# Patient Record
Sex: FEMALE | Race: WHITE | NOT HISPANIC OR LATINO | Employment: OTHER | URBAN - METROPOLITAN AREA
[De-identification: names, ages, dates, MRNs, and addresses within clinical notes are randomized per-mention and may not be internally consistent; named-entity substitution may affect disease eponyms.]

---

## 2017-04-06 ENCOUNTER — HOSPITAL ENCOUNTER (OUTPATIENT)
Dept: NON INVASIVE DIAGNOSTICS | Facility: MEDICAL CENTER | Age: 77
Discharge: HOME | End: 2017-04-06
Payer: MEDICARE

## 2017-04-06 DIAGNOSIS — I10 ESSENTIAL (PRIMARY) HYPERTENSION: ICD-10-CM

## 2017-04-06 DIAGNOSIS — I25.10 ATHEROSCLEROTIC HEART DISEASE OF NATIVE CORONARY ARTERY WITHOUT ANGINA PECTORIS: ICD-10-CM

## 2017-04-06 PROCEDURE — 93306 TTE W/DOPPLER COMPLETE: CPT

## 2017-05-22 ENCOUNTER — ALLSCRIPTS OFFICE VISIT (OUTPATIENT)
Dept: OTHER | Facility: OTHER | Age: 77
End: 2017-05-22

## 2017-06-21 ENCOUNTER — ALLSCRIPTS OFFICE VISIT (OUTPATIENT)
Dept: OTHER | Facility: OTHER | Age: 77
End: 2017-06-21

## 2017-06-21 DIAGNOSIS — I10 ESSENTIAL (PRIMARY) HYPERTENSION: ICD-10-CM

## 2017-06-21 DIAGNOSIS — F41.9 ANXIETY DISORDER: ICD-10-CM

## 2017-06-21 DIAGNOSIS — M81.0 AGE-RELATED OSTEOPOROSIS WITHOUT CURRENT PATHOLOGICAL FRACTURE: ICD-10-CM

## 2017-06-21 DIAGNOSIS — E78.5 HYPERLIPIDEMIA: ICD-10-CM

## 2017-06-28 LAB
ADDITIONAL INFORMATION (HISTORICAL): NORMAL
ADEQUACY: (HISTORICAL): NORMAL
COMMENT (HISTORICAL): NORMAL
CYTOTECHNOLOGIST: (HISTORICAL): NORMAL
HPV MRNA E6/E7 (HISTORICAL): NOT DETECTED
INTERPRETATION (HISTORICAL): NORMAL
LMP (HISTORICAL): NORMAL
PREV. BX: (HISTORICAL): NORMAL
PREV. PAP (HISTORICAL): NORMAL
SOURCE (HISTORICAL): NORMAL

## 2017-07-25 ENCOUNTER — APPOINTMENT (OUTPATIENT)
Dept: LAB | Facility: MEDICAL CENTER | Age: 77
End: 2017-07-25
Payer: MEDICARE

## 2017-07-25 DIAGNOSIS — E78.5 HYPERLIPIDEMIA: ICD-10-CM

## 2017-07-25 DIAGNOSIS — F41.9 ANXIETY DISORDER: ICD-10-CM

## 2017-07-25 DIAGNOSIS — M81.0 AGE-RELATED OSTEOPOROSIS WITHOUT CURRENT PATHOLOGICAL FRACTURE: ICD-10-CM

## 2017-07-25 DIAGNOSIS — I10 ESSENTIAL (PRIMARY) HYPERTENSION: ICD-10-CM

## 2017-07-25 LAB
25(OH)D3 SERPL-MCNC: 43.5 NG/ML (ref 30–100)
ALBUMIN SERPL BCP-MCNC: 3.2 G/DL (ref 3.5–5)
ALP SERPL-CCNC: 86 U/L (ref 46–116)
ALT SERPL W P-5'-P-CCNC: 20 U/L (ref 12–78)
ANION GAP SERPL CALCULATED.3IONS-SCNC: 6 MMOL/L (ref 4–13)
AST SERPL W P-5'-P-CCNC: 18 U/L (ref 5–45)
BASOPHILS # BLD AUTO: 0.02 THOUSANDS/ΜL (ref 0–0.1)
BASOPHILS NFR BLD AUTO: 1 % (ref 0–1)
BILIRUB SERPL-MCNC: 0.93 MG/DL (ref 0.2–1)
BUN SERPL-MCNC: 13 MG/DL (ref 5–25)
CALCIUM SERPL-MCNC: 8.8 MG/DL (ref 8.3–10.1)
CHLORIDE SERPL-SCNC: 108 MMOL/L (ref 100–108)
CHOLEST SERPL-MCNC: 196 MG/DL (ref 50–200)
CO2 SERPL-SCNC: 26 MMOL/L (ref 21–32)
CREAT SERPL-MCNC: 0.68 MG/DL (ref 0.6–1.3)
EOSINOPHIL # BLD AUTO: 0.18 THOUSAND/ΜL (ref 0–0.61)
EOSINOPHIL NFR BLD AUTO: 4 % (ref 0–6)
ERYTHROCYTE [DISTWIDTH] IN BLOOD BY AUTOMATED COUNT: 13.4 % (ref 11.6–15.1)
GFR SERPL CREATININE-BSD FRML MDRD: 85 ML/MIN/1.73SQ M
GLUCOSE P FAST SERPL-MCNC: 83 MG/DL (ref 65–99)
HCT VFR BLD AUTO: 37.7 % (ref 34.8–46.1)
HDLC SERPL-MCNC: 54 MG/DL (ref 40–60)
HGB BLD-MCNC: 12.4 G/DL (ref 11.5–15.4)
LDLC SERPL CALC-MCNC: 119 MG/DL (ref 0–100)
LYMPHOCYTES # BLD AUTO: 0.92 THOUSANDS/ΜL (ref 0.6–4.47)
LYMPHOCYTES NFR BLD AUTO: 22 % (ref 14–44)
MCH RBC QN AUTO: 30.2 PG (ref 26.8–34.3)
MCHC RBC AUTO-ENTMCNC: 32.9 G/DL (ref 31.4–37.4)
MCV RBC AUTO: 92 FL (ref 82–98)
MONOCYTES # BLD AUTO: 0.48 THOUSAND/ΜL (ref 0.17–1.22)
MONOCYTES NFR BLD AUTO: 11 % (ref 4–12)
NEUTROPHILS # BLD AUTO: 2.65 THOUSANDS/ΜL (ref 1.85–7.62)
NEUTS SEG NFR BLD AUTO: 62 % (ref 43–75)
NRBC BLD AUTO-RTO: 0 /100 WBCS
PLATELET # BLD AUTO: 194 THOUSANDS/UL (ref 149–390)
PMV BLD AUTO: 10.6 FL (ref 8.9–12.7)
POTASSIUM SERPL-SCNC: 4 MMOL/L (ref 3.5–5.3)
PROT SERPL-MCNC: 6.5 G/DL (ref 6.4–8.2)
RBC # BLD AUTO: 4.11 MILLION/UL (ref 3.81–5.12)
SODIUM SERPL-SCNC: 140 MMOL/L (ref 136–145)
TRIGL SERPL-MCNC: 115 MG/DL
TSH SERPL DL<=0.05 MIU/L-ACNC: 1.17 UIU/ML (ref 0.36–3.74)
WBC # BLD AUTO: 4.26 THOUSAND/UL (ref 4.31–10.16)

## 2017-07-25 PROCEDURE — 82306 VITAMIN D 25 HYDROXY: CPT

## 2017-07-25 PROCEDURE — 85025 COMPLETE CBC W/AUTO DIFF WBC: CPT

## 2017-07-25 PROCEDURE — 84443 ASSAY THYROID STIM HORMONE: CPT

## 2017-07-25 PROCEDURE — 80061 LIPID PANEL: CPT

## 2017-07-25 PROCEDURE — 80053 COMPREHEN METABOLIC PANEL: CPT

## 2017-07-25 PROCEDURE — 36415 COLL VENOUS BLD VENIPUNCTURE: CPT

## 2017-08-22 ENCOUNTER — HOSPITAL ENCOUNTER (INPATIENT)
Facility: HOSPITAL | Age: 77
LOS: 3 days | Discharge: HOME | DRG: 394 | End: 2017-08-25
Attending: EMERGENCY MEDICINE | Admitting: INTERNAL MEDICINE
Payer: MEDICARE

## 2017-08-22 DIAGNOSIS — K92.2 GI BLEED: Primary | ICD-10-CM

## 2017-08-22 DIAGNOSIS — K62.5 RECTAL BLEEDING: ICD-10-CM

## 2017-08-22 PROBLEM — K21.9 GERD (GASTROESOPHAGEAL REFLUX DISEASE): Chronic | Status: ACTIVE | Noted: 2017-08-22

## 2017-08-22 PROBLEM — I10 HTN (HYPERTENSION): Chronic | Status: ACTIVE | Noted: 2017-08-22

## 2017-08-22 PROBLEM — I25.10 CAD (CORONARY ARTERY DISEASE): Chronic | Status: ACTIVE | Noted: 2017-08-22

## 2017-08-22 LAB
ANION GAP SERPL CALCULATED.3IONS-SCNC: 8 MMOL/L (ref 4–13)
APTT PPP: 21 SECONDS (ref 23–35)
BASOPHILS # BLD AUTO: 0.02 THOUSANDS/ΜL (ref 0–0.1)
BASOPHILS NFR BLD AUTO: 0 % (ref 0–1)
BUN SERPL-MCNC: 16 MG/DL (ref 5–25)
CALCIUM SERPL-MCNC: 9.1 MG/DL (ref 8.3–10.1)
CHLORIDE SERPL-SCNC: 105 MMOL/L (ref 100–108)
CO2 SERPL-SCNC: 26 MMOL/L (ref 21–32)
CREAT SERPL-MCNC: 0.78 MG/DL (ref 0.6–1.3)
EOSINOPHIL # BLD AUTO: 0.2 THOUSAND/ΜL (ref 0–0.61)
EOSINOPHIL NFR BLD AUTO: 3 % (ref 0–6)
ERYTHROCYTE [DISTWIDTH] IN BLOOD BY AUTOMATED COUNT: 13.5 % (ref 11.6–15.1)
GFR SERPL CREATININE-BSD FRML MDRD: 74 ML/MIN/1.73SQ M
GLUCOSE SERPL-MCNC: 103 MG/DL (ref 65–140)
HCT VFR BLD AUTO: 38.2 % (ref 34.8–46.1)
HCT VFR BLD AUTO: 42.8 % (ref 34.8–46.1)
HGB BLD-MCNC: 12.2 G/DL (ref 11.5–15.4)
HGB BLD-MCNC: 13.9 G/DL (ref 11.5–15.4)
HOLD SPECIMEN: NORMAL
INR PPP: 0.91 (ref 0.86–1.16)
LACTATE SERPL-SCNC: 1.3 MMOL/L (ref 0.5–2)
LYMPHOCYTES # BLD AUTO: 1.28 THOUSANDS/ΜL (ref 0.6–4.47)
LYMPHOCYTES NFR BLD AUTO: 18 % (ref 14–44)
MCH RBC QN AUTO: 30 PG (ref 26.8–34.3)
MCHC RBC AUTO-ENTMCNC: 32.5 G/DL (ref 31.4–37.4)
MCV RBC AUTO: 92 FL (ref 82–98)
MONOCYTES # BLD AUTO: 0.55 THOUSAND/ΜL (ref 0.17–1.22)
MONOCYTES NFR BLD AUTO: 8 % (ref 4–12)
NEUTROPHILS # BLD AUTO: 5.12 THOUSANDS/ΜL (ref 1.85–7.62)
NEUTS SEG NFR BLD AUTO: 71 % (ref 43–75)
PLATELET # BLD AUTO: 192 THOUSANDS/UL (ref 149–390)
PMV BLD AUTO: 10.4 FL (ref 8.9–12.7)
POTASSIUM SERPL-SCNC: 4.2 MMOL/L (ref 3.5–5.3)
PROTHROMBIN TIME: 12.5 SECONDS (ref 12.1–14.4)
RBC # BLD AUTO: 4.63 MILLION/UL (ref 3.81–5.12)
SODIUM SERPL-SCNC: 139 MMOL/L (ref 136–145)
WBC # BLD AUTO: 7.17 THOUSAND/UL (ref 4.31–10.16)

## 2017-08-22 PROCEDURE — 85730 THROMBOPLASTIN TIME PARTIAL: CPT | Performed by: EMERGENCY MEDICINE

## 2017-08-22 PROCEDURE — 80048 BASIC METABOLIC PNL TOTAL CA: CPT | Performed by: EMERGENCY MEDICINE

## 2017-08-22 PROCEDURE — 99285 EMERGENCY DEPT VISIT HI MDM: CPT

## 2017-08-22 PROCEDURE — 83605 ASSAY OF LACTIC ACID: CPT | Performed by: INTERNAL MEDICINE

## 2017-08-22 PROCEDURE — 36415 COLL VENOUS BLD VENIPUNCTURE: CPT | Performed by: EMERGENCY MEDICINE

## 2017-08-22 PROCEDURE — 85014 HEMATOCRIT: CPT | Performed by: INTERNAL MEDICINE

## 2017-08-22 PROCEDURE — 85610 PROTHROMBIN TIME: CPT | Performed by: EMERGENCY MEDICINE

## 2017-08-22 PROCEDURE — 85025 COMPLETE CBC W/AUTO DIFF WBC: CPT | Performed by: EMERGENCY MEDICINE

## 2017-08-22 PROCEDURE — 96360 HYDRATION IV INFUSION INIT: CPT

## 2017-08-22 PROCEDURE — 85018 HEMOGLOBIN: CPT | Performed by: INTERNAL MEDICINE

## 2017-08-22 PROCEDURE — 96361 HYDRATE IV INFUSION ADD-ON: CPT

## 2017-08-22 RX ORDER — AMLODIPINE BESYLATE 5 MG/1
5 TABLET ORAL DAILY
Status: DISCONTINUED | OUTPATIENT
Start: 2017-08-23 | End: 2017-08-25 | Stop reason: HOSPADM

## 2017-08-22 RX ORDER — NITROGLYCERIN 0.4 MG/1
0.4 TABLET SUBLINGUAL
Status: DISCONTINUED | OUTPATIENT
Start: 2017-08-22 | End: 2017-08-25 | Stop reason: HOSPADM

## 2017-08-22 RX ORDER — PANTOPRAZOLE SODIUM 40 MG/1
40 TABLET, DELAYED RELEASE ORAL
Status: DISCONTINUED | OUTPATIENT
Start: 2017-08-23 | End: 2017-08-25 | Stop reason: HOSPADM

## 2017-08-22 RX ORDER — ACETAMINOPHEN 325 MG/1
650 TABLET ORAL EVERY 6 HOURS PRN
Status: DISCONTINUED | OUTPATIENT
Start: 2017-08-22 | End: 2017-08-25 | Stop reason: HOSPADM

## 2017-08-22 RX ORDER — CHOLECALCIFEROL (VITAMIN D3) 125 MCG
200 CAPSULE ORAL DAILY
Status: DISCONTINUED | OUTPATIENT
Start: 2017-08-23 | End: 2017-08-25 | Stop reason: HOSPADM

## 2017-08-22 RX ORDER — UBIDECARENONE 75 MG
1 CAPSULE ORAL DAILY
COMMUNITY
End: 2018-10-26 | Stop reason: ALTCHOICE

## 2017-08-22 RX ORDER — PRASUGREL 10 MG/1
10 TABLET, FILM COATED ORAL EVERY OTHER DAY
COMMUNITY
End: 2018-10-05 | Stop reason: SDUPTHER

## 2017-08-22 RX ORDER — CIPROFLOXACIN 2 MG/ML
400 INJECTION, SOLUTION INTRAVENOUS EVERY 12 HOURS
Status: DISCONTINUED | OUTPATIENT
Start: 2017-08-22 | End: 2017-08-25 | Stop reason: SDUPTHER

## 2017-08-22 RX ORDER — METOPROLOL SUCCINATE 25 MG/1
25 TABLET, EXTENDED RELEASE ORAL DAILY
COMMUNITY
End: 2018-05-30 | Stop reason: SDUPTHER

## 2017-08-22 RX ORDER — MELATONIN
2000 DAILY
Status: DISCONTINUED | OUTPATIENT
Start: 2017-08-23 | End: 2017-08-25 | Stop reason: HOSPADM

## 2017-08-22 RX ORDER — SODIUM CHLORIDE 9 MG/ML
75 INJECTION, SOLUTION INTRAVENOUS CONTINUOUS
Status: DISCONTINUED | OUTPATIENT
Start: 2017-08-22 | End: 2017-08-24

## 2017-08-22 RX ORDER — LOSARTAN POTASSIUM 50 MG/1
50 TABLET ORAL DAILY
Status: DISCONTINUED | OUTPATIENT
Start: 2017-08-23 | End: 2017-08-25 | Stop reason: HOSPADM

## 2017-08-22 RX ORDER — TORSEMIDE 20 MG/1
20 TABLET ORAL DAILY
COMMUNITY
End: 2020-12-05 | Stop reason: HOSPADM

## 2017-08-22 RX ORDER — ISOSORBIDE MONONITRATE 60 MG/1
60 TABLET, EXTENDED RELEASE ORAL DAILY
Status: DISCONTINUED | OUTPATIENT
Start: 2017-08-23 | End: 2017-08-25 | Stop reason: HOSPADM

## 2017-08-22 RX ORDER — RANOLAZINE 500 MG/1
500 TABLET, EXTENDED RELEASE ORAL DAILY
Status: DISCONTINUED | OUTPATIENT
Start: 2017-08-22 | End: 2017-08-25 | Stop reason: HOSPADM

## 2017-08-22 RX ORDER — ONDANSETRON 2 MG/ML
4 INJECTION INTRAMUSCULAR; INTRAVENOUS EVERY 6 HOURS PRN
Status: DISCONTINUED | OUTPATIENT
Start: 2017-08-22 | End: 2017-08-25 | Stop reason: HOSPADM

## 2017-08-22 RX ADMIN — SODIUM CHLORIDE 1000 ML: 0.9 INJECTION, SOLUTION INTRAVENOUS at 17:17

## 2017-08-22 RX ADMIN — METOPROLOL TARTRATE 25 MG: 25 TABLET ORAL at 22:23

## 2017-08-22 RX ADMIN — CIPROFLOXACIN 400 MG: 2 INJECTION, SOLUTION INTRAVENOUS at 22:24

## 2017-08-22 RX ADMIN — SODIUM CHLORIDE 75 ML/HR: 0.9 INJECTION, SOLUTION INTRAVENOUS at 21:43

## 2017-08-23 LAB
ANION GAP SERPL CALCULATED.3IONS-SCNC: 9 MMOL/L (ref 4–13)
BUN SERPL-MCNC: 11 MG/DL (ref 5–25)
CALCIUM SERPL-MCNC: 8.6 MG/DL (ref 8.3–10.1)
CHLORIDE SERPL-SCNC: 107 MMOL/L (ref 100–108)
CO2 SERPL-SCNC: 22 MMOL/L (ref 21–32)
CREAT SERPL-MCNC: 0.68 MG/DL (ref 0.6–1.3)
ERYTHROCYTE [DISTWIDTH] IN BLOOD BY AUTOMATED COUNT: 13.5 % (ref 11.6–15.1)
GFR SERPL CREATININE-BSD FRML MDRD: 85 ML/MIN/1.73SQ M
GLUCOSE SERPL-MCNC: 102 MG/DL (ref 65–140)
HCT VFR BLD AUTO: 38.6 % (ref 34.8–46.1)
HGB BLD-MCNC: 12.3 G/DL (ref 11.5–15.4)
MCH RBC QN AUTO: 29.9 PG (ref 26.8–34.3)
MCHC RBC AUTO-ENTMCNC: 31.9 G/DL (ref 31.4–37.4)
MCV RBC AUTO: 94 FL (ref 82–98)
PLATELET # BLD AUTO: 175 THOUSANDS/UL (ref 149–390)
PMV BLD AUTO: 10.4 FL (ref 8.9–12.7)
POTASSIUM SERPL-SCNC: 3.8 MMOL/L (ref 3.5–5.3)
RBC # BLD AUTO: 4.12 MILLION/UL (ref 3.81–5.12)
SODIUM SERPL-SCNC: 138 MMOL/L (ref 136–145)
WBC # BLD AUTO: 4.63 THOUSAND/UL (ref 4.31–10.16)

## 2017-08-23 PROCEDURE — 85027 COMPLETE CBC AUTOMATED: CPT | Performed by: INTERNAL MEDICINE

## 2017-08-23 PROCEDURE — 80048 BASIC METABOLIC PNL TOTAL CA: CPT | Performed by: INTERNAL MEDICINE

## 2017-08-23 RX ORDER — METOPROLOL TARTRATE 50 MG/1
50 TABLET, FILM COATED ORAL DAILY
Status: DISCONTINUED | OUTPATIENT
Start: 2017-08-23 | End: 2017-08-25 | Stop reason: HOSPADM

## 2017-08-23 RX ADMIN — METRONIDAZOLE 500 MG: 500 INJECTION, SOLUTION INTRAVENOUS at 08:48

## 2017-08-23 RX ADMIN — ACETAMINOPHEN 650 MG: 325 TABLET ORAL at 06:00

## 2017-08-23 RX ADMIN — LOSARTAN POTASSIUM 50 MG: 50 TABLET ORAL at 08:47

## 2017-08-23 RX ADMIN — SODIUM CHLORIDE 75 ML/HR: 0.9 INJECTION, SOLUTION INTRAVENOUS at 11:32

## 2017-08-23 RX ADMIN — AMLODIPINE BESYLATE 5 MG: 5 TABLET ORAL at 08:47

## 2017-08-23 RX ADMIN — PANTOPRAZOLE SODIUM 40 MG: 40 TABLET, DELAYED RELEASE ORAL at 06:01

## 2017-08-23 RX ADMIN — METRONIDAZOLE 500 MG: 500 INJECTION, SOLUTION INTRAVENOUS at 01:05

## 2017-08-23 RX ADMIN — METRONIDAZOLE 500 MG: 500 INJECTION, SOLUTION INTRAVENOUS at 16:31

## 2017-08-23 RX ADMIN — ISOSORBIDE MONONITRATE 60 MG: 60 TABLET, EXTENDED RELEASE ORAL at 08:47

## 2017-08-23 RX ADMIN — CIPROFLOXACIN 400 MG: 2 INJECTION, SOLUTION INTRAVENOUS at 21:18

## 2017-08-23 RX ADMIN — METOPROLOL TARTRATE 25 MG: 25 TABLET ORAL at 08:47

## 2017-08-23 RX ADMIN — RANOLAZINE 500 MG: 500 TABLET, FILM COATED, EXTENDED RELEASE ORAL at 08:47

## 2017-08-23 RX ADMIN — CHOLECALCIFEROL TAB 25 MCG (1000 UNIT) 2000 UNITS: 25 TAB at 08:47

## 2017-08-23 RX ADMIN — CIPROFLOXACIN 400 MG: 2 INJECTION, SOLUTION INTRAVENOUS at 11:31

## 2017-08-24 LAB
HCT VFR BLD AUTO: 38.4 % (ref 34.8–46.1)
HGB BLD-MCNC: 12.3 G/DL (ref 11.5–15.4)

## 2017-08-24 PROCEDURE — 85018 HEMOGLOBIN: CPT | Performed by: INTERNAL MEDICINE

## 2017-08-24 PROCEDURE — 85014 HEMATOCRIT: CPT | Performed by: INTERNAL MEDICINE

## 2017-08-24 RX ADMIN — METRONIDAZOLE 500 MG: 500 INJECTION, SOLUTION INTRAVENOUS at 16:44

## 2017-08-24 RX ADMIN — METOPROLOL TARTRATE 25 MG: 25 TABLET ORAL at 13:24

## 2017-08-24 RX ADMIN — SODIUM CHLORIDE 75 ML/HR: 0.9 INJECTION, SOLUTION INTRAVENOUS at 05:35

## 2017-08-24 RX ADMIN — AMLODIPINE BESYLATE 5 MG: 5 TABLET ORAL at 09:34

## 2017-08-24 RX ADMIN — METRONIDAZOLE 500 MG: 500 INJECTION, SOLUTION INTRAVENOUS at 01:57

## 2017-08-24 RX ADMIN — CHOLECALCIFEROL TAB 25 MCG (1000 UNIT) 2000 UNITS: 25 TAB at 09:33

## 2017-08-24 RX ADMIN — Medication 200 MG: at 09:33

## 2017-08-24 RX ADMIN — PANTOPRAZOLE SODIUM 40 MG: 40 TABLET, DELAYED RELEASE ORAL at 05:41

## 2017-08-24 RX ADMIN — RANOLAZINE 500 MG: 500 TABLET, FILM COATED, EXTENDED RELEASE ORAL at 09:33

## 2017-08-24 RX ADMIN — LOSARTAN POTASSIUM 50 MG: 50 TABLET ORAL at 09:33

## 2017-08-24 RX ADMIN — METRONIDAZOLE 500 MG: 500 INJECTION, SOLUTION INTRAVENOUS at 09:29

## 2017-08-24 RX ADMIN — CIPROFLOXACIN 400 MG: 2 INJECTION, SOLUTION INTRAVENOUS at 10:26

## 2017-08-24 RX ADMIN — METOPROLOL TARTRATE 50 MG: 50 TABLET ORAL at 09:34

## 2017-08-24 RX ADMIN — ISOSORBIDE MONONITRATE 60 MG: 60 TABLET, EXTENDED RELEASE ORAL at 09:33

## 2017-08-25 VITALS
RESPIRATION RATE: 18 BRPM | TEMPERATURE: 97.6 F | OXYGEN SATURATION: 97 % | DIASTOLIC BLOOD PRESSURE: 72 MMHG | SYSTOLIC BLOOD PRESSURE: 142 MMHG | HEART RATE: 74 BPM | WEIGHT: 125.66 LBS | BODY MASS INDEX: 26.38 KG/M2 | HEIGHT: 58 IN

## 2017-08-25 PROBLEM — K62.5 RECTAL BLEEDING: Status: RESOLVED | Noted: 2017-08-22 | Resolved: 2017-08-25

## 2017-08-25 PROBLEM — K52.9 COLITIS: Status: ACTIVE | Noted: 2017-08-25

## 2017-08-25 LAB
HCT VFR BLD AUTO: 37.6 % (ref 34.8–46.1)
HGB BLD-MCNC: 11.8 G/DL (ref 11.5–15.4)

## 2017-08-25 PROCEDURE — 85014 HEMATOCRIT: CPT | Performed by: INTERNAL MEDICINE

## 2017-08-25 PROCEDURE — 85018 HEMOGLOBIN: CPT | Performed by: INTERNAL MEDICINE

## 2017-08-25 RX ORDER — CIPROFLOXACIN 500 MG/1
500 TABLET, FILM COATED ORAL EVERY 12 HOURS SCHEDULED
Status: DISCONTINUED | OUTPATIENT
Start: 2017-08-25 | End: 2017-08-25 | Stop reason: HOSPADM

## 2017-08-25 RX ORDER — POLYETHYLENE GLYCOL 3350 17 G/17G
17 POWDER, FOR SOLUTION ORAL DAILY
Qty: 30 G | Refills: 0 | Status: SHIPPED | OUTPATIENT
Start: 2017-08-25 | End: 2018-10-26 | Stop reason: ALTCHOICE

## 2017-08-25 RX ORDER — METRONIDAZOLE 500 MG/1
500 TABLET ORAL EVERY 8 HOURS SCHEDULED
Status: DISCONTINUED | OUTPATIENT
Start: 2017-08-25 | End: 2017-08-25 | Stop reason: HOSPADM

## 2017-08-25 RX ORDER — METRONIDAZOLE 500 MG/1
500 TABLET ORAL EVERY 8 HOURS SCHEDULED
Qty: 12 TABLET | Refills: 0 | Status: SHIPPED | OUTPATIENT
Start: 2017-08-25 | End: 2017-08-29

## 2017-08-25 RX ORDER — LANOLIN ALCOHOL/MO/W.PET/CERES
6 CREAM (GRAM) TOPICAL
Status: DISCONTINUED | OUTPATIENT
Start: 2017-08-25 | End: 2017-08-25 | Stop reason: HOSPADM

## 2017-08-25 RX ORDER — CIPROFLOXACIN 500 MG/1
500 TABLET, FILM COATED ORAL EVERY 12 HOURS SCHEDULED
Qty: 8 TABLET | Refills: 0 | Status: SHIPPED | OUTPATIENT
Start: 2017-08-25 | End: 2017-08-29

## 2017-08-25 RX ADMIN — Medication 200 MG: at 09:03

## 2017-08-25 RX ADMIN — METOPROLOL TARTRATE 50 MG: 50 TABLET ORAL at 09:03

## 2017-08-25 RX ADMIN — AMLODIPINE BESYLATE 5 MG: 5 TABLET ORAL at 09:03

## 2017-08-25 RX ADMIN — METRONIDAZOLE 500 MG: 500 TABLET ORAL at 10:09

## 2017-08-25 RX ADMIN — PANTOPRAZOLE SODIUM 40 MG: 40 TABLET, DELAYED RELEASE ORAL at 06:09

## 2017-08-25 RX ADMIN — CIPROFLOXACIN 500 MG: 500 TABLET, FILM COATED ORAL at 10:09

## 2017-08-25 RX ADMIN — CHOLECALCIFEROL TAB 25 MCG (1000 UNIT) 2000 UNITS: 25 TAB at 09:03

## 2017-08-25 RX ADMIN — MELATONIN 6 MG: 3 TAB ORAL at 01:10

## 2017-08-25 RX ADMIN — ISOSORBIDE MONONITRATE 60 MG: 60 TABLET, EXTENDED RELEASE ORAL at 09:03

## 2017-08-25 RX ADMIN — LOSARTAN POTASSIUM 50 MG: 50 TABLET ORAL at 09:03

## 2017-08-25 RX ADMIN — RANOLAZINE 500 MG: 500 TABLET, FILM COATED, EXTENDED RELEASE ORAL at 09:03

## 2017-08-28 ENCOUNTER — GENERIC CONVERSION - ENCOUNTER (OUTPATIENT)
Dept: OTHER | Facility: OTHER | Age: 77
End: 2017-08-28

## 2017-11-07 ENCOUNTER — ALLSCRIPTS OFFICE VISIT (OUTPATIENT)
Dept: OTHER | Facility: OTHER | Age: 77
End: 2017-11-07

## 2017-11-08 NOTE — PROGRESS NOTES
Assessment  1  Nasal congestion (478 19) (R09 81)    Plan  Nasal congestion    · Follow-up PRN Evaluation and Treatment  Follow-up  Status: Complete  Done:  12MPY7103    Discussion/Summary    Suspect allergies  Recommend patient start over-the-counter Claritin  May benefit from a warm air humidifier as well due to dry air as a result of the heat being turned on  Follow-up in 1 week if symptoms persist or sooner if needed  Possible side effects of new medications were reviewed with the patient/guardian today  The treatment plan was reviewed with the patient/guardian  The patient/guardian understands and agrees with the treatment plan      Chief Complaint  Patient is here today with complaints of right ear being clogged, denies any pain  Started on Sunday  History of Present Illness  HPI: Patient with 2 days of right ear clogged feeling and sore throat  Has been using Coricidin HBP with little relief  Does not take any allergy medications  Denies cough and nasal congestion  No fevers  Patient also states she has been running the furnace more  Review of Systems    Constitutional: no fever  Cardiovascular: no chest pain  Respiratory: no shortness of breath  Active Problems  1  Anxiety disorder (300 00) (F41 9)   2  Arthralgia of temporomandibular joint (524 62) (M26 629)   3  Arthritis of knee, right (716 96) (M17 11)   4  Atherosclerosis of both lower extremities with intermittent claudication (440 21) (I70 213)   5  Breast cancer screening (V76 10) (Z12 39)   6  CABG   7  Cardiac angina (413 9) (I20 9)   8  Carotid artery bruit (785 9) (R09 89)   9  Carotid artery stenosis (433 10) (I65 29)   10  Cataract of both eyes (366 9) (H26 9)   11  Colitis (558 9) (K52 9)   12  Colon cancer screening (V76 51) (Z12 11)   13  Coronary artery arteriosclerosis (414 00) (I25 10)   14  Costochondritis (733 6) (M94 0)   15  Deep vein thrombosis of bilateral lower extremities (453 40) (I82 403)   16   Dyspepsia (536  8) (K30)   17  Elevated glucose (790 29) (R73 09)   18  Encounter for screening mammogram for malignant neoplasm of breast (V76 12)    (Z12 31)   19  Esophageal reflux (530 81) (K21 9)   20  Essential hypertension (401 9) (I10)   21  Heart disease (429 9) (I51 9)   22  Hyperlipidemia (272 4) (E78 5)   23  Intermittent claudication (443 9) (I73 9)   24  Irritable bowel syndrome (564 1) (K58 9)   25  Ischemic colitis (557 9) (K55 9)   26  Laceration of calf (891 0) (S81 819A)   27  Laceration of lower extremity, right, initial encounter (894 0) (S81 811A)   28  Left carotid bruit (785 9) (R09 89)   29  Leukocytosis (288 60) (D72 829)   30  Limb pain (729 5) (M79 609)   31  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)   32  Nasal congestion (478 19) (R09 81)   33  Osteopenia (733 90) (M85 80)   34  Osteoporosis (733 00) (M81 0)   35  Plantar fasciitis (728 71) (M72 2)   36  Preoperative evaluation to rule out surgical contraindication (V72 83) (Z01 818)   37  Renal artery stenosis (440 1) (I70 1)   38  Renovascular hypertension (405 91) (I15 0)   39  Right knee pain (719 46) (M25 561)   40  Seasonal allergic rhinitis (477 9) (J30 2)   41  Sore throat (462) (J02 9)   42  Urinary tract infection (599 0) (N39 0)   43  History of Visit for routine gyn exam (V72 31) (Z01 419)    Past Medical History  1  History of Anal fissure (565 0) (K60 2)   2  History of Esophagitis, reflux (530 11) (K21 0)   3  History of  2 (V22 2) (Z33 1)   4  History of Hepatic hemangioma (228 04) (D18 03)   5  History of Herniated disc (722 2)   6  History of cardiac disorder (V12 50) (Z86 79)   7  History of colonic polyps (V12 72) (Z86 010)   8  History of hemorrhoids (V13 89) (Z87 19)   9  History of herpes zoster (V12 09) (Z86 19)   10  History of hypertension (V12 59) (Z86 79)   11  History of osteoarthritis (V13 4) (Z87 39)   12  History of Raynaud's syndrome (V12 59) (Z86 79)   13   History of Sjogren's disease (V13 59) (Z87 39) 14  History of Influenza vaccination administered at current visit (V04 81) (Z23)   15  History of Malignant neoplasm without specification of site (199 1) (C80 1)   16  History of Need for vaccination with 13-polyvalent pneumococcal conjugate vaccine    (V03 82) (Z23)   17  History of Nephrolithiasis (V13 01)   18  History of Nontoxic single thyroid nodule (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) (E04 1)   19  History of OAB (overactive bladder) (596 51) (N32 81)   20  Personal history of respiratory system disease (V12 60) (Z87 09)   21  Personal history of spinal stenosis (V13 59) (Z87 39)   22  History of Screening for hypothyroidism (V77 0) (Z13 29)   23  History of PONCHO (stress urinary incontinence, female) (625 6) (N39 3)   24  History of Uterovaginal prolapse (618 4) (N81 4)   25  History of Visit for routine gyn exam (V72 31) (Z01 419)    Family History  Mother    1  Denied: Family history of Colon cancer   2  Denied: Family history of Crohn's disease without complication, unspecified   gastrointestinal tract location   3  Denied: Family history of liver disease   4  Family history of Heart Disease (V17 49)   5  Family history of Hypertension (V17 49)   6  Family history of Mother  At Age 80   9  Family history of Osteoporosis (V17 81)  Father    6  Denied: Family history of Colon cancer   9  Denied: Family history of Crohn's disease without complication, unspecified   gastrointestinal tract location   10  Denied: Family history of liver disease   11  Family history of Father  At Age 76   12  Family history of Heart Disease (V17 49)   13  Family history of Hypertension (V17 49)  Family History    15  Family history of colitis (V18 59) (Z83 79)   15   Family history of colonic polyps (V18 51) (Z83 71)    Social History   · Activities: Golf   · Caffeine Use   · Consumes healthy diet (V49 89)   · Daily caffeinated coffee consumption   · 1 cup per day   · Drinks caffeinated tea   · 1 cup per day   ·    · Never A Smoker   · No illicit drug use   · Not currently sexually active   · Retired from employment   · Social alcohol use (Z78 9)   · Well balanced diet (V49 89) (Z78 9)    Surgical History  1  History of Appendectomy   2  History of CABG   3  CABG   4  History of Cataract Surgery   5  History of Cath Stent Placement   6  History of Colonoscopy (Fiberoptic)   7  History of Complete Colonoscopy   8  History of Destruction Of Malignant Lesion Face   9  History of Diagnostic Esophagogastroduodenoscopy   10  History of Excision Of Lesion Scalp Malignant   11  History of Hemorrhoidectomy   12  History of Knee Surgery   13  History of Postoperative Thrombolysis PTCA   14  History of Renal Lithotripsy   15  History of Shoulder Excision Of Soft Tissue Tumor   16  History of Tonsillectomy    Current Meds   1  Adult Aspirin EC Low Strength 81 MG Oral Tablet Delayed Release; TAKE 1 TABLET   DAILY; Therapy: (Recorded:12Jan2015) to Recorded   2  AmLODIPine Besylate 5 MG Oral Tablet; TAKE 1 TABLET DAILY  Requested for:   99GMH3929; Last Rx:01Jun2017 Ordered   3  Co Q-10 200 MG Oral Capsule; Therapy: (Recorded:95Rud3831) to Recorded   4  Colace 100 MG Oral Capsule; TAKE 1 CAPSULE DAILY; Therapy: (Recorded:30Jun2015) to Recorded   5  Daily Multiple Vitamins Oral Tablet; Take 1 tablet daily Recorded   6  Effient 10 MG Oral Tablet; TAKE 10 MG Every other day  Requested for: 45YQM5002; Last   Rx:03Nov2017 Ordered   7  Isosorbide Mononitrate ER 60 MG Oral Tablet Extended Release 24 Hour; take 1 tablet   by mouth every day; Therapy: 42AHW6403 to (Evaluate:26Ozb7322)  Requested for: 51FPN7349; Last   Rx:41Gcq4169 Ordered   8  Losartan Potassium 50 MG Oral Tablet; take 1 tablet by mouth once daily; Therapy: 18ASF2580 to (Jazzy Cano)  Requested for: 01GBK4731; Last   Rx:13Mar2017 Ordered   9  Metoprolol Succinate ER 25 MG Oral Tablet Extended Release 24 Hour;  Take 1 tablet   twice daily  Requested for: 37PIG6294; Last Rx:14Nov2016; Status: ACTIVE - Transmit to   Pharmacy - Awaiting Verification Ordered   10  Nitroglycerin 0 4 MG Sublingual Tablet Sublingual; DISSOLVE 1 TABLET UNDER THE    TONGUE AS NEEDED FOR CHEST PAIN;    Therapy: 01VOH2989 to (Evaluate:70Ibf4285)  Requested for: 11KJX5721; Last    Rx:13Mar2017 Ordered   11  Pantoprazole Sodium 40 MG Oral Tablet Delayed Release; TAKE 1 TABLET DAILY; Therapy: 27Agn0749 to (Evaluate:12Opt3167)  Requested for: 65EVT7963 Recorded   12  Ranexa 500 MG Oral Tablet Extended Release 12 Hour; TAKE 1 TABLET EVERY 12    HOURS  Requested for: 59Tpg0340; Last Rx:89Itv6147 Ordered   13  Torsemide 20 MG Oral Tablet; TAKE TABLET  0 5 tab q o d; Therapy: 66VRO4276 to Recorded   14  Vitamin D3 2000 UNIT Oral Capsule; TAKE 1 CAPSULE ONCE DAILY; Therapy: (Recorded:12Jan2015) to Recorded    The medication list was reviewed and updated today  Allergies  1  Codeine Derivatives   2  Codeine Sulfate TABS   3  Sulfa Drugs    Vitals   Recorded: 51MWS6913 10:43AM   Temperature 97 8 F   Heart Rate 68   Respiration 16   Systolic 793   Diastolic 64   Weight 987 lb    BMI Calculated 25 92   BSA Calculated 1 49     Physical Exam    Constitutional   General appearance: No acute distress, well appearing and well nourished  Eyes   Conjunctiva and lids: No swelling, erythema or discharge  Pupils and irises: Equal, round and reactive to light  Ears, Nose, Mouth, and Throat   External inspection of ears and nose: Normal     Otoscopic examination: Abnormal   The right tympanic membrane was normal  The left tympanic membrane was normal  The right external canal was normal  The left external canal was normal  Exam of the right middle ear showed a middle ear effusion that was serous  Exam of the left middle ear showed a middle ear effusion that was serous  Nasal mucosa, septum, and turbinates: Abnormal   There was clear rhinorrhea from both nares   The bilateral nasal mucosa was edematous, but-- not pale/blue-- and-- not red  Oropharynx: Abnormal   The posterior pharynx Postnasal drainage  , but-- was not erythematous-- and-- did not have an exudate  Pulmonary   Respiratory effort: No increased work of breathing or signs of respiratory distress  Auscultation of lungs: Clear to auscultation  Cardiovascular   Auscultation of heart: Normal rate and rhythm, normal S1 and S2, without murmurs  Examination of extremities for edema and/or varicosities: Normal     Lymphatic   Palpation of lymph nodes in neck: No lymphadenopathy  Future Appointments    Date/Time Provider Specialty Site   06/27/2018 01:00 PM ADALBERTO Hammer  [address] [facility]   11/13/2017 03:40 PM ADALBERTO Potts   Cardiology [facility]     Signatures   Electronically signed by : Juan Quiroz DO; Nov 7 2017 11:10AM EST                       (Author)

## 2017-11-10 ENCOUNTER — OFFICE VISIT (OUTPATIENT)
Dept: URGENT CARE | Facility: MEDICAL CENTER | Age: 77
End: 2017-11-10
Payer: MEDICARE

## 2017-11-10 PROCEDURE — G0463 HOSPITAL OUTPT CLINIC VISIT: HCPCS

## 2017-11-10 PROCEDURE — 99213 OFFICE O/P EST LOW 20 MIN: CPT

## 2017-11-11 NOTE — PROGRESS NOTES
Assessment    1  Dysfunction of right eustachian tube (381 81) (H69 81)    Plan  Dysfunction of right eustachian tube    · Fluticasone Propionate 50 MCG/ACT Nasal Suspension; use 1 spray in eachnostril twice daily  can use coricidin for congestion  clear fluid not infected  Chief Complaint    1  Hearing Loss  Chief Complaint Free Text Note Form: Presents with right ear hearing loss since Tuesday  Seen by FMD and was told ear is impacted  History of Present Illness  HPI: 67-year-old female complains of right ear feeling clogged for 1 week  She was seen by her family doctor and told to start Claritin  She says it is a little better since starting the Claritin but still there  She says she feels like she is underwater  No ear pain  Some postnasal drip last week for called with the [facility] Practices Required Assessment:  Pain Assessment  the patient states they do not have pain  Abuse And Domestic Violence Screen   Yes, the patient is safe at home  -- The patient states no one is hurting them  Depression And Suicide Screen  No, the patient has not had thoughts of hurting themself  No, the patient has not felt depressed in the past 7 days  Prefered Language is  [place]  Primary Language is  English  Readiness To Learn: Receptive  Barriers To Learning: none  Preferred Learning: verbal      Active Problems    1  Anxiety disorder (300 00) (F41 9)   2  Arthralgia of temporomandibular joint (524 62) (M26 629)   3  Arthritis of knee, right (716 96) (M17 11)   4  Atherosclerosis of both lower extremities with intermittent claudication (440 21) (I70 213)   5  Breast cancer screening (V76 10) (Z12 39)   6  CABG   7  Cardiac angina (413 9) (I20 9)   8  Carotid artery bruit (785 9) (R09 89)   9  Carotid artery stenosis (433 10) (I65 29)   10  Cataract of both eyes (366 9) (H26 9)   11  Colitis (558 9) (K52 9)   12  Colon cancer screening (V76 51) (Z12 11)   13   Coronary artery arteriosclerosis (414 00) (I25 10)   14  Costochondritis (733 6) (M94 0)   15  Deep vein thrombosis of bilateral lower extremities (453 40) (I82 403)   16  Dyspepsia (536 8) (K30)   17  Elevated glucose (790 29) (R73 09)   18  Encounter for screening mammogram for malignant neoplasm of breast (V76 12)  (Z12 31)   19  Esophageal reflux (530 81) (K21 9)   20  Essential hypertension (401 9) (I10)   21  Heart disease (429 9) (I51 9)   22  Hyperlipidemia (272 4) (E78 5)   23  Intermittent claudication (443 9) (I73 9)   24  Irritable bowel syndrome (564 1) (K58 9)   25  Ischemic colitis (557 9) (K55 9)   26  Laceration of calf (891 0) (S81 819A)   27  Laceration of lower extremity, right, initial encounter (894 0) (S81 811A)   28  Left carotid bruit (785 9) (R09 89)   29  Leukocytosis (288 60) (D72 829)   30  Limb pain (729 5) (M79 609)   31  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)   32  Nasal congestion (478 19) (R09 81)   33  Osteopenia (733 90) (M85 80)   34  Osteoporosis (733 00) (M81 0)   35  Plantar fasciitis (728 71) (M72 2)   36  Preoperative evaluation to rule out surgical contraindication (V72 83) (Z01 818)   37  Renal artery stenosis (440 1) (I70 1)   38  Renovascular hypertension (405 91) (I15 0)   39  Right knee pain (719 46) (M25 561)   40  Seasonal allergic rhinitis (477 9) (J30 2)   41  Sore throat (462) (J02 9)   42  Urinary tract infection (599 0) (N39 0)   43  History of Visit for routine gyn exam (2 31) (Z01 419)    Past Medical History    1  History of Anal fissure (565 0) (K60 2)   2  History of Esophagitis, reflux (530 11) (K21 0)   3  History of  2 (V22 2) (Z33 1)   4  History of Hepatic hemangioma (228 04) (D18 03)   5  History of Herniated disc (722 2)   6  History of cardiac disorder (V12 50) (Z86 79)   7  History of colonic polyps (V12 72) (Z86 010)   8  History of hemorrhoids (V13 89) (Z87 19)   9  History of herpes zoster (V12 09) (Z86 19)   10  History of hypertension (V12 59) (Z86 79)   11  History of osteoarthritis (V13 4) (Z87 39)   12  History of Raynaud's syndrome (V12 59) (Z86 79)   13  History of Sjogren's disease (V13 59) (Z87 39)   14  History of Influenza vaccination administered at current visit (V04 81) (Z23)   15  History of Malignant neoplasm without specification of site (199 1) (C80 1)   16  History of Need for vaccination with 13-polyvalent pneumococcal conjugate vaccine  (V03 82) (Z23)   17  History of Nephrolithiasis (V13 01)   18  History of Nontoxic single thyroid nodule (241 0) (E04 1)   19  History of OAB (overactive bladder) (596 51) (N32 81)   20  Personal history of respiratory system disease (V12 60) (Z87 09)   21  Personal history of spinal stenosis (V13 59) (Z87 39)   22  History of Screening for hypothyroidism (V77 0) (Z13 29)   23  History of PONCHO (stress urinary incontinence, female) (625 6) (N39 3)   24  History of Uterovaginal prolapse (618 4) (N81 4)   25  History of Visit for routine gyn exam (V72 31) (Z01 419)    Family History  Mother    1  Denied: Family history of Colon cancer   2  Denied: Family history of Crohn's disease without complication, unspecified gastrointestinal tract location   3  Denied: Family history of liver disease   4  Family history of Heart Disease (V17 49)   5  Family history of Hypertension (V17 49)   6  Family history of Mother  At Age 80   9  Family history of Osteoporosis (V17 81)  Father    6  Denied: Family history of Colon cancer   9  Denied: Family history of Crohn's disease without complication, unspecified gastrointestinal tract location   10  Denied: Family history of liver disease   11  Family history of Father  At Age 76   12  Family history of Heart Disease (V17 49)   13  Family history of Hypertension (V17 49)  Family History    15  Family history of colitis (V18 59) (Z83 79)   15   Family history of colonic polyps (V18 51) (Z83 71)    Social History   · Activities: Golf   · Caffeine Use   · Consumes healthy diet (V49 89)   · Daily caffeinated coffee consumption   · Drinks caffeinated tea   ·    · Never A Smoker   · No illicit drug use   · Not currently sexually active   · Retired from employment   · Social alcohol use (Z78 9)   · Well balanced diet (V49 89) (Z78 9)    Surgical History    1  History of Appendectomy   2  History of CABG   3  CABG   4  History of Cataract Surgery   5  History of Cath Stent Placement   6  History of Colonoscopy (Fiberoptic)   7  History of Complete Colonoscopy   8  History of Destruction Of Malignant Lesion Face   9  History of Diagnostic Esophagogastroduodenoscopy   10  History of Excision Of Lesion Scalp Malignant   11  History of Hemorrhoidectomy   12  History of Knee Surgery   13  History of Postoperative Thrombolysis PTCA   14  History of Renal Lithotripsy   15  History of Shoulder Excision Of Soft Tissue Tumor   16  History of Tonsillectomy    Current Meds   1  Adult Aspirin EC Low Strength 81 MG Oral Tablet Delayed Release; TAKE 1 TABLET DAILY; Therapy: (Recorded:12Jan2015) to Recorded   2  AmLODIPine Besylate 5 MG Oral Tablet; TAKE 1 TABLET DAILY  Requested for: 62LUD5303; Last Rx:01Jun2017 Ordered   3  Co Q-10 200 MG Oral Capsule; Therapy: (Recorded:49Swr0666) to Recorded   4  Colace 100 MG Oral Capsule; TAKE 1 CAPSULE DAILY; Therapy: (Recorded:30Jun2015) to Recorded   5  Daily Multiple Vitamins Oral Tablet; Take 1 tablet daily Recorded   6  Effient 10 MG Oral Tablet; TAKE 10 MG Every other day  Requested for: 91HBI7368; Last Rx:03Nov2017 Ordered   7  Isosorbide Mononitrate ER 60 MG Oral Tablet Extended Release 24 Hour; take 1 tablet by mouth every day; Therapy: 06LPB2416 to (Evaluate:00Rjv2233)  Requested for: 72VXG6604; Last Rx:59Obo3066 Ordered   8  Losartan Potassium 50 MG Oral Tablet; take 1 tablet by mouth once daily; Therapy: 09VTM7676 to (Pantera Garvin)  Requested for: 67RVD6588; Last Rx:13Mar2017 Ordered   9   Metoprolol Succinate ER 25 MG Oral Tablet Extended Release 24 Hour; Take 1 tablet twice daily  Requested for: 86VPS8470; Last Rx:14Nov2016; Status: ACTIVE - Transmit to [facility] Verification Ordered   10  Nitroglycerin 0 4 MG Sublingual Tablet Sublingual; DISSOLVE 1 TABLET UNDER THE  TONGUE AS NEEDED FOR CHEST PAIN;  Therapy: 05YBJ2938 to (Evaluate:77Qqz5074)  Requested for: 45AAW5872; Last  Rx:71Sak6767 Ordered   11  Pantoprazole Sodium 40 MG Oral Tablet Delayed Release; TAKE 1 TABLET DAILY; Therapy: 69Hph0617 to (Evaluate:24Nop8633)  Requested for: 21XOM8038 Recorded   12  Ranexa 500 MG Oral Tablet Extended Release 12 Hour; TAKE 1 TABLET EVERY 12  HOURS  Requested for: 96Grm9709; Last Rx:14Eou4700 Ordered   13  Torsemide 20 MG Oral Tablet; TAKE TABLET  0 5 tab q o d; Therapy: 32LIB3587 to Recorded   14  Vitamin D3 2000 UNIT Oral Capsule; TAKE 1 CAPSULE ONCE DAILY; Therapy: (Recorded:12Jan2015) to Recorded    Allergies    1  Codeine Derivatives   2  Codeine Sulfate TABS   3  Sulfa Drugs    Vitals  Signs   Recorded: 93LXZ7612 03:14PM   Temperature: 97 7 F, Temporal  Heart Rate: 105  Pulse Quality: Normal  Respiration Quality: Normal  Respiration: 18  Systolic: 958, Sitting  Diastolic: 70, Sitting  O2 Saturation: 98, RA    Physical Exam   Constitutional  General appearance: No acute distress, well appearing and well nourished  Eyes  Conjunctiva and lids: No swelling, erythema or discharge  Pupils and irises: Equal, round and reactive to light  Ears, Nose, Mouth, and Throat  External inspection of ears and nose: Normal    Otoscopic examination: Abnormal  -- Ear canal patent bilateral  Her right TM does have a serous effusion with no erythema or bulging  Left ear normal   Nasal mucosa, septum, and turbinates: Normal without edema or erythema  Oropharynx: Normal with no erythema, edema, exudate or lesions  Future Appointments    Date/Time Provider Specialty Site   06/27/2018 01:00 PM ADALBERTO Mcgee   [address] [facility]   11/13/2017 03:40 PM ADALBERTO Tran   Cardiology [facility]       Signatures   Electronically signed by : Damián Mar, [facility]; Nov 10 2017  4:08PM EST                       (Author)    Electronically signed by : MARCO Erazo ; Nov 10 2017  5:24PM EST                       (Co-author)

## 2017-11-13 ENCOUNTER — ALLSCRIPTS OFFICE VISIT (OUTPATIENT)
Dept: OTHER | Facility: OTHER | Age: 77
End: 2017-11-13

## 2017-11-13 DIAGNOSIS — E78.5 HYPERLIPIDEMIA: ICD-10-CM

## 2017-11-13 DIAGNOSIS — I10 ESSENTIAL (PRIMARY) HYPERTENSION: ICD-10-CM

## 2017-11-13 DIAGNOSIS — I25.10 ATHEROSCLEROTIC HEART DISEASE OF NATIVE CORONARY ARTERY WITHOUT ANGINA PECTORIS: ICD-10-CM

## 2017-11-14 NOTE — PROGRESS NOTES
Assessment  Assessed    1  CABG   2  Essential hypertension (401 9) (I10)   3  Hyperlipidemia (272 4) (E78 5)   4  Coronary artery arteriosclerosis (414 00) (I25 10)    Plan  CABG, Coronary artery arteriosclerosis, Essential hypertension, Hyperlipidemia    · Follow-up visit in 6 months Evaluation and Treatment  Follow-up  Status: Hold For -Scheduling  Requested for: 77SEF2729   Ordered;CABG, Coronary artery arteriosclerosis, Essential hypertension, Hyperlipidemia; Ordered By: Rishi Tian Performed:  Due: 99KWP1933   · VAS CAROTID COMPLETE STUDY; SIDE:Bilateral; Status:Hold For - Scheduling;Requested for:13Nov2017;    Perform:[facility]; Due:13Nov2018; Ordered;Coronary artery arteriosclerosis, Essential hypertension, Hyperlipidemia; Ordered By:Esthela Fisher; Discussion/Summary  Cardiology Discussion Summary Free Text Note Form [facility]:   I will continue her present medical regimen  She appears well compensated  I've asked her to call if there is a problem in the interim otherwise I will see her again in six months time  She does have evidence of a bruit over the left carotid and I will check a carotid Doppler  Her last Doppler was done in March of last year and demonstrated bilateral carotid disease with a 50-69% stenosis noted in the right and left side  Chief Complaint  Chief Complaint Free Text Note Form: f/u  denies any cardiac issues      History of Present Illness  Cardiology HPI Free Text Note Form [facility]: Patient is here for a follow-up visit  She was last seen by me in May 2017  Since that time she has done well  She has had no chest pain or significant dyspnea  Coronary Artery Disease (Follow-Up): The patient states she has been stable with her coronary artery disease symptoms since the last visit  She has no significant interval events  Symptoms: The patient is currently asymptomatic  Hyperlipidemia (Follow-Up):  The patient states her hyperlipidemia has been under good control since the last visit  She has no significant interval events  Symptoms: The patient is currently asymptomatic  Hypertension (Follow-Up): The patient presents for follow-up of essential hypertension  The patient states she has been doing well with her blood pressure control since the last visit  She has no significant interval events  Symptoms: The patient is currently asymptomatic  Review of Systems  Cardiology Female ROS:    Cardiac: No complaints of chest pain, no palpitations, no fainting  Skin: No complaints of nonhealing sores or skin rash  Genitourinary: No complaints of recurrent urinary tract infections, frequent urination at night, difficult urination, blood in urine, kidney stones, loss of bladder control, kidney problems, denies any birth control or hormone replacement, is not post menopausal, not currently pregnant  Psychological: No complaints of feeling depressed, anxiety, panic attacks, or difficulty concentrating  General: No complaints of trouble sleeping, lack of energy, fatigue, appetite changes, weight changes, fever, frequent infections, or night sweats  Respiratory: No complaints of shortness of breath, cough with sputum, or wheezing  HEENT: No complaints of serious problems, hearing problems, nose problems, throat problems, or snoring  Gastrointestinal: No complaints of liver problems, nausea, vomiting, heartburn, constipation, bloody stools, diarrhea, problems swallowing, adbominal pain, or rectal bleeding  Hematologic: No complaints of bleeding disorders, anemia, blood clots, or excessive brusing  Neurological: No complaints of numbness, tingling, dizziness, weakness, seizures, headaches, syncope or fainting, AM fatigue, daytime sleepiness, no witnessed apnea episodes  Musculoskeletal: arthritis,-- back pain-- and-- swelling/pain, but-- No complaints of arthritis, back pain, or painfull swelling  Active Problems  Problems    1   Anxiety disorder (300 00) (F41 9)   2  Arthralgia of temporomandibular joint (524 62) (M26 629)   3  Arthritis of knee, right (716 96) (M17 11)   4  Atherosclerosis of both lower extremities with intermittent claudication (440 21) (I70 213)   5  Breast cancer screening (V76 10) (Z12 39)   6  CABG   7  Cardiac angina (413 9) (I20 9)   8  Carotid artery bruit (785 9) (R09 89)   9  Carotid artery stenosis (433 10) (I65 29)   10  Cataract of both eyes (366 9) (H26 9)   11  Colitis (558 9) (K52 9)   12  Colon cancer screening (V76 51) (Z12 11)   13  Coronary artery arteriosclerosis (414 00) (I25 10)   14  Costochondritis (733 6) (M94 0)   15  Deep vein thrombosis of bilateral lower extremities (453 40) (I82 403)   16  Dysfunction of right eustachian tube (381 81) (H69 81)   17  Dyspepsia (536 8) (K30)   18  Elevated glucose (790 29) (R73 09)   19  Encounter for screening mammogram for malignant neoplasm of breast (V76 12)  (Z12 31)   20  Esophageal reflux (530 81) (K21 9)   21  Essential hypertension (401 9) (I10)   22  Heart disease (429 9) (I51 9)   23  Hyperlipidemia (272 4) (E78 5)   24  Intermittent claudication (443 9) (I73 9)   25  Irritable bowel syndrome (564 1) (K58 9)   26  Ischemic colitis (557 9) (K55 9)   27  Laceration of calf (891 0) (S81 819A)   28  Laceration of lower extremity, right, initial encounter (894 0) (S81 811A)   29  Left carotid bruit (785 9) (R09 89)   30  Leukocytosis (288 60) (D72 829)   31  Limb pain (729 5) (M79 609)   32  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)   33  Nasal congestion (478 19) (R09 81)   34  Osteopenia (733 90) (M85 80)   35  Osteoporosis (733 00) (M81 0)   36  Plantar fasciitis (728 71) (M72 2)   37  Preoperative evaluation to rule out surgical contraindication (V72 83) (Z01 818)   38  Renal artery stenosis (440 1) (I70 1)   39  Renovascular hypertension (405 91) (I15 0)   40  Right knee pain (719 46) (M25 561)   41  Seasonal allergic rhinitis (477 9) (J30 2)   42   Sore throat (462) (J02 9)   43  Urinary tract infection (599 0) (N39 0)   44  History of Visit for routine gyn exam (V72 31) (Z01 419)    Past Medical History  Problems    1  History of Anal fissure (565 0) (K60 2)   2  History of Esophagitis, reflux (530 11) (K21 0)   3  History of  2 (V22 2) (Z33 1)   4  History of Hepatic hemangioma (228 04) (D18 03)   5  History of Herniated disc (722 2)   6  History of cardiac disorder (V12 50) (Z86 79)   7  History of colonic polyps (V12 72) (Z86 010)   8  History of hemorrhoids (V13 89) (Z87 19)   9  History of herpes zoster (V12 09) (Z86 19)   10  History of hypertension (V12 59) (Z86 79)   11  History of osteoarthritis (V13 4) (Z87 39)   12  History of Raynaud's syndrome (V12 59) (Z86 79)   13  History of Sjogren's disease (V13 59) (Z87 39)   14  History of Influenza vaccination administered at current visit (V04 81) (Z23)   15  History of Malignant neoplasm without specification of site (199 1) (C80 1)   16  History of Need for vaccination with 13-polyvalent pneumococcal conjugate vaccine  (V03 82) (Z23)   17  History of Nephrolithiasis (V13 01)   18  History of Nontoxic single thyroid nodule (241 0) (E04 1)   19  History of OAB (overactive bladder) (596 51) (N32 81)   20  Personal history of respiratory system disease (V12 60) (Z87 09)   21  Personal history of spinal stenosis (V13 59) (Z87 39)   22  History of Screening for hypothyroidism (V77 0) (Z13 29)   23  History of PONCHO (stress urinary incontinence, female) (625 6) (N39 3)   24  History of Uterovaginal prolapse (618 4) (N81 4)   25  History of Visit for routine gyn exam (V72 31) (Z01 419)  Active Problems And Past Medical History Reviewed: The active problems and past medical history were reviewed and updated today  Surgical History  Problems    1  History of Appendectomy   2  History of CABG   3  CABG   4  History of Cataract Surgery   5  History of Cath Stent Placement   6  History of Colonoscopy (Fiberoptic)   7  History of Complete Colonoscopy   8  History of Destruction Of Malignant Lesion Face   9  History of Diagnostic Esophagogastroduodenoscopy   10  History of Excision Of Lesion Scalp Malignant   11  History of Hemorrhoidectomy   12  History of Knee Surgery   13  History of Postoperative Thrombolysis PTCA   14  History of Renal Lithotripsy   15  History of Shoulder Excision Of Soft Tissue Tumor   16  History of Tonsillectomy    Family History  Mother    1  Denied: Family history of Colon cancer   2  Denied: Family history of Crohn's disease without complication, unspecified gastrointestinal tract location   3  Denied: Family history of liver disease   4  Family history of Heart Disease (V17 49)   5  Family history of Hypertension (V17 49)   6  Family history of Mother  At Age 80   9  Family history of Osteoporosis (V17 81)  Father    6  Denied: Family history of Colon cancer   9  Denied: Family history of Crohn's disease without complication, unspecified gastrointestinal tract location   10  Denied: Family history of liver disease   11  Family history of Father  At Age 76   12  Family history of Heart Disease (V17 49)   13  Family history of Hypertension (V17 49)  Family History    15  Family history of colitis (V18 59) (Z83 79)   15  Family history of colonic polyps (V18 51) (Z83 71)    Social History  Problems    · Activities: Golf   · Caffeine Use   · Consumes healthy diet (V49 89)   · Daily caffeinated coffee consumption   · Drinks caffeinated tea   ·    · Never A Smoker   · No illicit drug use   · Not currently sexually active   · Retired from employment   · Social alcohol use (Z78 9)   · Well balanced diet (V49 89) (Z78 9)    Current Meds   1  Adult Aspirin EC Low Strength 81 MG Oral Tablet Delayed Release; TAKE 1 TABLET DAILY; Therapy: (Recorded:2015) to Recorded   2  AmLODIPine Besylate 5 MG Oral Tablet; TAKE 1 TABLET DAILY  Requested for: 86WGB8368; Last Rx:2017 Ordered   3   Co Q-10 200 MG Oral Capsule; Therapy: (Recorded:14Ctg5625) to Recorded   4  Colace 100 MG Oral Capsule; TAKE 1 CAPSULE DAILY; Therapy: (Recorded:30Jun2015) to Recorded   5  Daily Multiple Vitamins Oral Tablet; Take 1 tablet daily Recorded   6  Effient 10 MG Oral Tablet; TAKE 10 MG Every other day  Requested for: 16PSC2480; Last Rx:03Nov2017 Ordered   7  Fluticasone Propionate 50 MCG/ACT Nasal Suspension; use 1 spray in each nostril twice daily; Therapy: 83CYA4336 to (Last Rx:10Nov2017)  Requested for: 92FJP1276 Ordered   8  Isosorbide Mononitrate ER 60 MG Oral Tablet Extended Release 24 Hour; take 1 tablet by mouth every day; Therapy: 34YGQ4017 to (Evaluate:59Fke6312)  Requested for: 37XMM6229; Last Rx:61Ygc2473 Ordered   9  Losartan Potassium 50 MG Oral Tablet; take 1 tablet by mouth once daily; Therapy: 61ZGH8223 to (Eve Bhatt)  Requested for: 75OTG7546; Last Rx:13Mar2017 Ordered   10  Metoprolol Succinate ER 25 MG Oral Tablet Extended Release 24 Hour; Take 1 tablet  twice daily  Requested for: 80TFG3604; Last Rx:14Nov2016; Status[facility]   11  Nitroglycerin 0 4 MG Sublingual Tablet Sublingual; DISSOLVE 1 TABLET UNDER THE  TONGUE AS NEEDED FOR CHEST PAIN;  Therapy: 73IAV8806 to (Evaluate:04Tza4615)  Requested for: 76WOG6977; Last  Rx:13Mar2017 Ordered   12  Pantoprazole Sodium 40 MG Oral Tablet Delayed Release; TAKE 1 TABLET DAILY; Therapy: 74Qid5990 to (Evaluate:40Vep9185)  Requested for: 83UEW9012 Recorded   13  Ranexa 500 MG Oral Tablet Extended Release 12 Hour; TAKE 1 TABLET EVERY 12  HOURS  Requested for: 17Kvs1053; Last Rx:10Qmm2899 Ordered   14  Torsemide 20 MG Oral Tablet; TAKE TABLET  0 5 tab q o d; Therapy: 48QOR2556 to Recorded   15  Vitamin D3 2000 UNIT Oral Capsule; TAKE 1 CAPSULE ONCE DAILY; Therapy: (Recorded:12Jan2015) to Recorded  Medication List Reviewed: The medication list was reviewed and updated today  Allergies  Medication    1  Codeine Derivatives   2  Codeine Sulfate TABS   3  Sulfa Drugs    Vitals  Vital Signs    Recorded: 03FTS0338 03:18PM   Heart Rate 80   Systolic 513, LUE, Sitting   Diastolic 60, LUE, Sitting   Height 4 ft 10 in   Weight 129 lb    BMI Calculated 26 96   BSA Calculated 1 51       Physical Exam   Constitutional  General appearance: No acute distress, well appearing and well nourished  Eyes  Conjunctiva and Sclera examination: Conjunctiva pink, sclera anicteric  Neck  Neck and thyroid: Normal, supple, trachea midline, no thyromegaly  Pulmonary  Respiratory effort: No increased work of breathing or signs of respiratory distress  Auscultation of lungs: Clear to auscultation, no rales, no rhonchi, no wheezing, good air movement  Cardiovascular  Palpation of heart: Normal PMI, no thrills  Auscultation of heart: Normal rate and rhythm, normal S1 and S2, no murmurs  Carotid pulses: Abnormal  -- Left-sided bruit  Examination of extremities for edema and/or varicosities: Normal    Chest - Chest: Normal   Musculoskeletal Gait and station: Normal gait  -- Digits and nails: Normal without clubbing or cyanosis  Neurologic - Speech: Normal    Psychiatric - Orientation to person, place, and time: Normal -- Mood and affect: Normal       Health Management  Colon cancer screening   COLONOSCOPY; every 5 years; Last 59Fth0533; Next Due: 67Ool6474; Active    Future Appointments    Date/Time Provider Specialty Site   06/27/2018 01:00 PM ADALBERTO Joseph   [address] [facility]       Signatures   Electronically signed by : ADALBERTO French ; Nov 13 2017  3:39PM EST                       (Author)

## 2017-11-27 ENCOUNTER — GENERIC CONVERSION - ENCOUNTER (OUTPATIENT)
Dept: OTHER | Facility: OTHER | Age: 77
End: 2017-11-27

## 2017-12-06 ENCOUNTER — HOSPITAL ENCOUNTER (OUTPATIENT)
Dept: NON INVASIVE DIAGNOSTICS | Facility: CLINIC | Age: 77
Discharge: HOME | End: 2017-12-06
Payer: MEDICARE

## 2017-12-06 ENCOUNTER — GENERIC CONVERSION - ENCOUNTER (OUTPATIENT)
Dept: OTHER | Facility: OTHER | Age: 77
End: 2017-12-06

## 2017-12-06 DIAGNOSIS — I10 ESSENTIAL (PRIMARY) HYPERTENSION: ICD-10-CM

## 2017-12-06 DIAGNOSIS — E78.5 HYPERLIPIDEMIA: ICD-10-CM

## 2017-12-06 DIAGNOSIS — I25.10 ATHEROSCLEROTIC HEART DISEASE OF NATIVE CORONARY ARTERY WITHOUT ANGINA PECTORIS: ICD-10-CM

## 2017-12-06 PROCEDURE — 93880 EXTRACRANIAL BILAT STUDY: CPT

## 2017-12-07 ENCOUNTER — GENERIC CONVERSION - ENCOUNTER (OUTPATIENT)
Dept: FAMILY MEDICINE CLINIC | Facility: MEDICAL CENTER | Age: 77
End: 2017-12-07

## 2018-01-12 NOTE — PROGRESS NOTES
Assessment    1  Encounter for preventive health examination (V70 0) (Z00 00)   2  Renal artery stenosis (440 1) (I70 1)   3  History of Visit for routine gyn exam (V72 31) (Z01 419)   4  Hyperlipidemia (272 4) (E78 5)   5  Essential hypertension (401 9) (I10)   6  History of Need for vaccination with 13-polyvalent pneumococcal conjugate vaccine   (V03 82) (Z23)   7  Irritable bowel syndrome (564 1) (K58 9)   8  Osteoporosis (733 00) (M81 0)    Plan  Anxiety disorder, Essential hypertension    · (1) TSH; Status:Active - Retrospective Authorization; Requested [id number];   Essential hypertension    · (1) CBC/PLT/DIFF; Status:Active - Retrospective Authorization; Requested  [id number];    · (1) COMPREHENSIVE METABOLIC PANEL; Status:Active - Retrospective Authorization; Requested [id number];   Essential hypertension, Hyperlipidemia    · (1) LIPID PANEL, FASTING; Status:Active - Retrospective Authorization; Requested  [id number];   Essential hypertension, Osteoporosis    · (1) VITAMIN D 25-HYDROXY; Status:Active - Retrospective Authorization; Requested  [id number];   PMH: Need for vaccination with 13-polyvalent pneumococcal conjugate vaccine    · Prevnar 13 Intramuscular Suspension  PMH: Visit for routine gyn exam    · (Q) THINPREP TIS PAP AND HR HPV DNA; Status:Active - Retrospective Authorization; Requested [id number];   PAP: : 3 years ago  : more then 10 years ago    Check pap smear, mammogram, Dexa scan, BW  Prevnar  Discussion/Summary    1  HM-due for mammogram, BW, Dexa scan  Colonoscopy up to date  Updated immunizations  Prevnar today  2  Osteoporosis-due for Dexa scan  Should continue exercise and CA supp  Check Vi tD  3  Renal artery stenosis-followed by vascular surgery  Due for renal functions  4  Uterine prolapse-offered referral for pessary ; declines at this time  5  Hyperlipidemia-due for BW  6  Osteoporosis-due for Dexa scan  Continue exercise, ca supplement     7  Gyn exam 8  HTN-controlled  History of Present Illness  HM, Adult Female: The patient is being seen for a health maintenance and gynecology evaluation  General Health: The patient's health since the last visit is described as good  Lifestyle:  She consumes a diverse and healthy diet  (Eats fruits and vegetables  )   She does not have any weight concerns  She exercises regularly  (No longer walking but still golfing )  Reproductive health:  she is not sexually active  Screening:      Review of Systems    Genitourinary: Feels a vaginal prolapse , but no dysuria, no pelvic pain, no vaginal discharge, no incontinence and no unexplained vaginal bleeding  Active Problems    1  Anxiety disorder (300 00) (F41 9)   2  Arthralgia of temporomandibular joint (524 62) (M26 629)   3  Arthritis of knee, right (716 96) (M17 11)   4  Atherosclerosis of both lower extremities with intermittent claudication (440 21) (I70 213)   5  Breast cancer screening (V76 10) (Z12 39)   6  CABG   7  Cardiac angina (413 9) (I20 9)   8  Carotid artery bruit (785 9) (R09 89)   9  Carotid artery stenosis (433 10) (I65 29)   10  Cataract of both eyes (366 9) (H26 9)   11  Colitis (558 9) (K52 9)   12  Colon cancer screening (V76 51) (Z12 11)   13  Coronary artery arteriosclerosis (414 00) (I25 10)   14  Costochondritis (733 6) (M94 0)   15  Deep vein thrombosis of bilateral lower extremities (453 40) (I82 403)   16  Dyspepsia (536 8) (K30)   17  Elevated glucose (790 29) (R73 09)   18  Encounter for screening mammogram for malignant neoplasm of breast (V76 12)    (Z12 31)   19  Esophageal reflux (530 81) (K21 9)   20  Essential hypertension (401 9) (I10)   21  Heart disease (429 9) (I51 9)   22  Hyperlipidemia (272 4) (E78 5)   23  Intermittent claudication (443 9) (I73 9)   24  Irritable bowel syndrome (564 1) (K58 9)   25  Ischemic colitis (557 9) (K55 9)   26  Laceration of calf (891 0) (S81 819A)   27   Laceration of lower extremity, right, initial encounter (894 0) (S81 811A)   28  Left carotid bruit (785 9) (R09 89)   29  Leukocytosis (288 60) (D72 829)   30  Limb pain (729 5) (M79 609)   31  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)   32  Nasal congestion (478 19) (R09 81)   33  Osteopenia (733 90) (M85 80)   34  Osteoporosis (733 00) (M81 0)   35  Plantar fasciitis (728 71) (M72 2)   36  Preoperative evaluation to rule out surgical contraindication (V72 83) (Z01 818)   37  Renal artery stenosis (440 1) (I70 1)   38  Renovascular hypertension (405 91) (I15 0)   39  Right knee pain (719 46) (M25 561)   40  Seasonal allergic rhinitis (477 9) (J30 2)   41  Sore throat (462) (J02 9)   42  Urinary tract infection (599 0) (N39 0)   43   History of Visit for routine gyn exam (V72 31) (Z01 419)    Past Medical History    · History of Anal fissure (565 0) (K60 2)   · History of Esophagitis, reflux (530 11) (K21 0)   · History of  2 (V22 2) (Z33 1)   · History of Hepatic hemangioma (228 04) (D18 03)   · History of Herniated disc (722 2)   · History of cardiac disorder (V12 50) (Z86 79)   · History of colonic polyps (V12 72) (Z86 010)   · History of hemorrhoids (V13 89) (Z87 19)   · History of herpes zoster (V12 09) (Z86 19)   · History of hypertension (V12 59) (Z86 79)   · History of osteoarthritis (V13 4) (Z87 39)   · History of Raynaud's syndrome (V12 59) (Z86 79)   · History of Sjogren's disease (V13 59) (Z87 39)   · History of Influenza vaccination administered at current visit (V04 81) (Z23)   · History of Malignant neoplasm without specification of site (199 1) (C80 1)   · History of Nephrolithiasis (V13 01)   · History of Nontoxic single thyroid nodule (241 0) (E04 1)   · History of OAB (overactive bladder) (596 51) (N32 81)   · Personal history of respiratory system disease (V12 60) (Z87 09)   · Personal history of spinal stenosis (V13 59) (Z87 39)   · History of Screening for hypothyroidism (V77 0) (Z13 29)   · History of PONCHO (stress urinary incontinence, female) (625 6) (N39 3)   · History of Uterovaginal prolapse (618 4) (N81 4)    Surgical History    · History of Appendectomy   · History of CABG   · CABG   · History of Cataract Surgery   · History of Cath Stent Placement   · History of Colonoscopy (Fiberoptic)   · History of Complete Colonoscopy   · History of Destruction Of Malignant Lesion Face   · History of Diagnostic Esophagogastroduodenoscopy   · History of Excision Of Lesion Scalp Malignant   · History of Hemorrhoidectomy   · History of Knee Surgery   · History of Postoperative Thrombolysis PTCA   · History of Renal Lithotripsy   · History of Shoulder Excision Of Soft Tissue Tumor   · History of Tonsillectomy    Family History  Mother    · Denied: Family history of Colon cancer   · Denied: Family history of Crohn's disease without complication, unspecified  gastrointestinal tract location   · Denied: Family history of liver disease   · Family history of Heart Disease (V17 49)   · Family history of Hypertension (V17 49)   · Family history of Mother  At Age 80   · Family history of Osteoporosis (V13 80)  Father    · Denied: Family history of Colon cancer   · Denied: Family history of Crohn's disease without complication, unspecified  gastrointestinal tract location   · Denied: Family history of liver disease   · Family history of Father  At Age 71   · Family history of Heart Disease (V17 49)   · Family history of Hypertension (V17 49)  Family History    · Family history of colitis (V18 59) (Z83 79)   · Family history of colonic polyps (V18 51) (Z83 71)    Social History    · Caffeine Use   ·    · Never A Smoker   · Social alcohol use (Z78 9)    Current Meds   1  Adult Aspirin EC Low Strength 81 MG Oral Tablet Delayed Release; TAKE 1 TABLET   DAILY; Therapy: (Recorded:2015) to Recorded   2  AmLODIPine Besylate 5 MG Oral Tablet; TAKE 1 TABLET DAILY  Requested for:   79VRO3801; Last Rx:2017 Ordered   3   Co Q-10 200 MG Oral Capsule; Therapy: (Recorded:33Ual2358) to Recorded   4  Colace 100 MG Oral Capsule; TAKE 1 CAPSULE DAILY; Therapy: (Recorded:30Jun2015) to Recorded   5  Daily Multiple Vitamins Oral Tablet; Take 1 tablet daily Recorded   6  Effient 10 MG Oral Tablet; TAKE 10 MG Every other day  Requested for: 98MZR0866; Last   Rx:04Oct2016 Ordered   7  Isosorbide Mononitrate ER 60 MG Oral Tablet Extended Release 24 Hour; TAKE 1   TABLET ONCE DAILY; Therapy: 14IHY2077 to (Evaluate:56Utk2330)  Requested for: 00Oes1349; Last   Rx:32Yhf8074 Ordered   8  Losartan Potassium 50 MG Oral Tablet; take 1 tablet by mouth once daily; Therapy: 91GOM5505 to (Gradie Simmonds)  Requested for: 75VMH8966; Last   Rx:13Mar2017 Ordered   9  Metoprolol Succinate ER 25 MG Oral Tablet Extended Release 24 Hour; Take 1 tablet   twice daily  Requested for: 47ZIS7063; Last Rx:14Nov2016; Status: ACTIVE - Transmit to   [facility] Verification Ordered   10  Nitroglycerin 0 4 MG Sublingual Tablet Sublingual; DISSOLVE 1 TABLET UNDER THE    TONGUE AS NEEDED FOR CHEST PAIN;    Therapy: 47BSO0201 to (Evaluate:39Lla9029)  Requested for: 89DGD3489; Last    Rx:13Mar2017 Ordered   11  Pantoprazole Sodium 40 MG Oral Tablet Delayed Release; TAKE 1 TABLET DAILY; Therapy: 35Hsp6884 to (Evaluate:68Sel1822)  Requested for: 46YYH7758 Recorded   12  Ranexa 500 MG Oral Tablet Extended Release 12 Hour; TAKE TABLET Daily  Requested    for: 59FGW6838; Last Rx:63Rcp6950 Ordered   13  Torsemide 20 MG Oral Tablet; TAKE TABLET  0 5 tab q o d; Therapy: 00VNQ1306 to Recorded   14  Vitamin D3 2000 UNIT Oral Capsule; TAKE 1 CAPSULE ONCE DAILY; Therapy: (Recorded:12Jan2015) to Recorded    Allergies    1  Codeine Derivatives   2  Codeine Sulfate TABS   3   Sulfa Drugs    Vitals   Recorded: 21Jun2017 01:56PM   Heart Rate 80   Respiration 16   Systolic 110   Diastolic 58   Weight 132 lb 2 oz   BMI Calculated 26 15   BSA Calculated 1 49   LMP 2000 Physical Exam    Constitutional   General appearance: No acute distress, well appearing and well nourished  Neck   Neck: Supple, symmetric, trachea midline, no masses  Thyroid: Normal, no thyromegaly  Pulmonary   Respiratory effort: No increased work of breathing or signs of respiratory distress  Auscultation of lungs: Clear to auscultation  Cardiovascular   Auscultation of heart: Normal rate and rhythm, normal S1 and S2, no murmurs  Carotid pulses: 2+ bilaterally  Abdominal aorta: Normal     Femoral pulses: 2+ bilaterally  Pedal pulses: 2+ bilaterally  Peripheral vascular exam: Normal     Chest   Breasts: Normal, no dimpling or skin changes appreciated  Palpation of breasts and axillae: Normal, no masses palpated  Chest: Normal     Abdomen   Liver and spleen: No hepatomegaly or splenomegaly  Examination for hernias: No hernia appreciated  Anus, perineum, and rectum: Normal sphincter tone, no masses, no prolapse  Stool sample for occult blood: Negative  Genitourinary   External genitalia and vagina: Abnormal   Cervix at the level of introitus  Urethra: Normal, no discharge  Cervix: Normal, no lesions  Uterus: Abnormal   Uterus descended  Adnexa/Parametria: Normal, no masses or tenderness  Lymphatic   Palpation of lymph nodes in neck: No lymphadenopathy  Palpation of lymph nodes in axillae: No lymphadenopathy  Palpation of lymph nodes in groin: No lymphadenopathy  Musculoskeletal   Gait and station: Normal     Skin   Skin and subcutaneous tissue: Normal without rashes or lesions  Psychiatric   Mood and affect: Normal        Health Management  Colon cancer screening   COLONOSCOPY; every 5 years; Last 12Dvz1236; Next Due: 81Eyi9667; Active    Future Appointments    Date/Time Provider Specialty Site   06/27/2018 01:00 PM ADALBERTO Jett   [address] [facility] [place]     Signatures   Electronically signed by : ADALBERTO Rg ; Isak 21 2017 11:41PM EST                       (Author)

## 2018-01-13 VITALS
BODY MASS INDEX: 25.92 KG/M2 | WEIGHT: 124 LBS | HEART RATE: 68 BPM | TEMPERATURE: 97.8 F | SYSTOLIC BLOOD PRESSURE: 130 MMHG | RESPIRATION RATE: 16 BRPM | DIASTOLIC BLOOD PRESSURE: 64 MMHG

## 2018-01-13 VITALS
HEIGHT: 58 IN | HEART RATE: 80 BPM | DIASTOLIC BLOOD PRESSURE: 60 MMHG | SYSTOLIC BLOOD PRESSURE: 134 MMHG | BODY MASS INDEX: 27.08 KG/M2 | WEIGHT: 129 LBS

## 2018-01-13 NOTE — RESULT NOTES
Message    Gastric and colon Biopsies are benign  Pt to call office PRN        [facility]  thanks 1  CF  pt's  Galileo Encinas aware of results  thanks 2  CF1       1 Amended By: Kassi Claudio; Apr 22 2016 9:29 AM EST   2 Amended By: Kassi Claudio; Apr 25 2016 11:43 AM EST    Verified Results  (1) TISSUE EXAM 13Apr2016 11:42AM Travis Jarquin     Test Name Result Flag Reference   LAB AP CASE REPORT (Report)     Surgical Pathology Report             Case: [id number]                   Authorizing Provider: Ruben Blanchard MD     Collected:      04/13/2016 1142        Ordering Location:   [facility]    Received:      04/13/2016 [address] [place] [address] Endoscopy                               Pathologist:      Nga Hubbard MD                              Specimens:  A) - Stomach, Gastric body, gastritis r/o H pylori                           B) - Large Intestine, Sigmoid Colon, Sigmoid colon biopsy h/o diarrhea   LAB AP FINAL DIAGNOSIS (Report)     A  Gastric body:  - Chronic inactive oxyntic gastritis, negative for curvilinear   Helicobacter pylori, confirmed by immunohistochemical stains, evaluated   with appropriate positive & negative controls  - No atrophy or intestinal metaplasia identified   - No epithelial dysplasia and no evidence of malignancy  B  Sigmoid colon:  - Benign polypoid colonic mucosa overlying a reactive lymphoid aggregate   - No epithelial dysplasia and no evidence of malignancy  Interpretation performed at [facility], [address] [address]   LAB AP SURGICAL ADDITIONAL INFORMATION (Report)     These tests were developed and their performance characteristics   determined by [address] [place] [address] Specialty Laboratory or [employer]  They may not be cleared or approved by the [place]  Food and   Drug Administration  The FDA has determined that such clearance or   approval is not necessary   These tests are used for clinical purposes  They should not be regarded as investigational or for research  This   laboratory has been approved by [place], designated as a high-complexity   laboratory and is qualified to perform these tests  LAB AP GROSS DESCRIPTION (Report)     A  The specimen is received in formalin, labeled with the patient's name   and hospital number, and is designated gastric body gastritis rule out   H  pylori  The specimen consists of 2 tan soft tissue fragments measuring   0 2 and 0 4 cm  Entirely submitted  One cassette  Note: The estimated total formalin fixation time based upon information   provided by the submitting clinician and the standard processing schedule   is 16 75 hours  B  The specimen is received in formalin, labeled with the patient's name   and hospital number, and is designated sigmoid colon  The specimen   consists of 3 tan soft tissue fragments measuring 0 4-0 5 cm each  Entirely submitted  One cassette  Note: The estimated total formalin fixation time based upon information   provided by the submitting clinician and the standard processing schedule   is 16 5 hours  MAC

## 2018-01-14 VITALS
SYSTOLIC BLOOD PRESSURE: 130 MMHG | HEART RATE: 70 BPM | BODY MASS INDEX: 26.45 KG/M2 | WEIGHT: 126 LBS | HEIGHT: 58 IN | DIASTOLIC BLOOD PRESSURE: 58 MMHG

## 2018-01-14 VITALS
RESPIRATION RATE: 16 BRPM | SYSTOLIC BLOOD PRESSURE: 122 MMHG | WEIGHT: 125.13 LBS | BODY MASS INDEX: 26.15 KG/M2 | HEART RATE: 80 BPM | DIASTOLIC BLOOD PRESSURE: 58 MMHG

## 2018-01-14 NOTE — PROGRESS NOTES
Assessment    1  Ischemic colitis (557 9) (K55 9)   2  Esophageal reflux (530 81) (K21 9)   3  Dyspepsia (536 8) (K30)    Plan  Dyspepsia    · US ABDOMEN LIMITED; Status:Hold For - Scheduling; Requested for:22Jan2016;    Perform:[facility]; PYL:[id number];MIDNHTR; For:Dyspepsia; Ordered By:Evelin Bone; Dyspepsia, Esophageal reflux    · Pantoprazole Sodium 40 MG Oral Tablet Delayed Release; TAKE 1 TABLET 30  MINUTES BEFORE BREAKFAST DAILY   Rx By: Alonzo Randolph; Dispense: 30 Days ; #:30 Tablet Delayed Release; Refill: 11; For: Dyspepsia, Esophageal reflux; CEDRICK = N; Verified Transmission to [facility]2165 Last Updated By: System, HackMyPic; 1/22/2016 11:37:45 AM  Ischemic colitis    · Dulcolax 5 MG Oral Tablet Delayed Release; TAKE 2 TABLET ONCE   Rx By: Alonzo Randolph; Dispense: 1 Days ; #:2 Tablet Delayed Release; Refill: 0; For: Ischemic colitis; CEDRICK = N; Verified Transmission to [facility]2118 Last Updated By: System, SureScripts; 1/22/2016 11:37:45 AM   · Golytely 227 1 GM Oral Solution Reconstituted; TAKE AS DIRECTED   Rx By: Alonzo Randolph; Dispense: 0 Days ; #:1 Solution Reconstituted; Refill: 0; For: Ischemic colitis; CEDRICK = N; Verified Transmission to [facility]9683 Last Updated By: System, SureScripts; 1/22/2016 11:37:46 AM   · COLONOSCOPY; Status:Active; Requested for:13Apr2016;    Perform:[facility]; Due:13Apr2018; Last Updated By:Sophy Osman; 1/22/2016 11:40:14 AM;Ordered; For:Ischemic colitis; Ordered By:Evelin Bone; Discussion/Summary  Discussion Summary:     ASSESSMENT:    #1  Episode of ischemic colitis- colonoscopy should be performed to rule out any other causes including lymphoma  #2  Epigastric pain and dyspepsia- appears to be from peptic ulcer disease or gastric erosions  Also consider biliary pathology  #3  Gastroesophageal reflux disease    #4   History of coronary disease status post CABG and stent placement-on antiplatelet therapy with Effient    PLAN:    #1  Patient was explained about the lifestyle and dietary modifications  Advised to avoid fatty foods, chocolates, caffeine, alcohol and any other triggering foods  Avoid eating for at least 3 hours before going to bed  #2  Start her on pantoprazole    #3  Check right upper current ultrasound    #4  Schedule for both EGD and colonoscopy    #5  Patient was explained about the risks and benefits of the procedure  Risks including but not limited to bleeding, infection, perforation were explained in detail  Also explained about less than 100% sensitivity with the exam and other alternatives  Understands and agrees with treatment plan: The treatment plan was reviewed with the patient/guardian  The patient/guardian understands and agrees with the treatment plan   Counseling Documentation With Imm: The patient was counseled regarding instructions for management, risk factor reductions, patient and family education, risks and benefits of treatment options, importance of compliance with treatment  Chief Complaint  Chief Complaint Free Text Note Form:   Followup from recent hospitalization      History of Present Illness  HPI:   Ms Bam Purdy came in for followup from recent hospitalization  She was admitted last month because of an episode of acute colitis involving splenic flexure, descending and sigmoid colon which was thought to be from ischemic colitis  Patient has history of coronary disease status post CABG, stent placement and also the renal artery stenosis  She has been doing well since she was discharged home and denies any more diarrhea  Regular bowel movements, denies any blood or mucus in the stool  She is complaining about epigastric pain and discomfort after meal  She was on pantoprazole in the past but she could not get that refilled  She is also the acid reflux  Denies end difficulty swallowing  History Reviewed:  The history was obtained today from the patient and I agree with the documented history  Review of Systems  Complete-Female GI Adult:   Constitutional: No fever, no chills, feels well, no tiredness, no recent weight gain or weight loss  Eyes: No complaints of eye pain, no red eyes, no eyesight problems, no discharge, no dry eyes, no itching of eyes  ENT: no complaints of earache, no loss of hearing, no nose bleeds, no nasal discharge, no sore throat, no hoarseness  Cardiovascular: No complaints of slow heart rate, no fast heart rate, no chest pain, no palpitations, no leg claudication, no lower extremity edema  Respiratory: No complaints of shortness of breath, no wheezing, no cough, no SOB on exertion, no orthopnea, no PND  Gastrointestinal: as noted in HPI  Genitourinary: No complaints of dysuria, no incontinence, no pelvic pain, no dysmenorrhea, no vaginal discharge or bleeding  Musculoskeletal: No complaints of arthralgias, no myalgias, no joint swelling or stiffness, no limb pain or swelling  Integumentary: No complaints of skin rash or lesions, no itching, no skin wounds, no breast pain or lump  Neurological: No complaints of headache, no confusion, no convulsions, no numbness, no dizziness or fainting, no tingling, no limb weakness, no difficulty walking  Psychiatric: Not suicidal, no sleep disturbance, no anxiety or depression, no change in personality, no emotional problems  Endocrine: No complaints of proptosis, no hot flashes, no muscle weakness, no deepening of the voice, no feelings of weakness  Hematologic/Lymphatic: No complaints of swollen glands, no swollen glands in the neck, does not bleed easily, does not bruise easily  Active Problems    1  Anxiety disorder (300 00) (F41 9)   2  Arthralgia of temporomandibular joint (524 62) (M26 62)   3  Arthritis of knee, right (716 96) (M19 90)   4  CABG   5  Cardiac angina (413 9) (I20 9)   6  Carotid artery bruit (785 9) (R09 89)   7  Carotid artery stenosis (433 10) (I65 29)   8  Cataract of both eyes (366 9) (H26 9)   9  Colitis (558 9) (K52 9)   10  Colon cancer screening (V76 51) (Z12 11)   11  Coronary artery arteriosclerosis (414 00) (I25 10)   12  Costochondritis (733 6) (M94 0)   13  Deep vein thrombosis of bilateral lower extremities (453 40) (I82 403)   14  Elevated glucose (790 29) (R73 09)   15  Encounter for screening mammogram for malignant neoplasm of breast (V76 12)    (Z12 31)   16  Esophageal reflux (530 81) (K21 9)   17  Essential hypertension (401 9) (I10)   18  Heart disease (429 9) (I51 9)   19  Hyperlipidemia (272 4) (E78 5)   20  Intermittent claudication (443 9) (I73 9)   21  Irritable bowel syndrome (564 1) (K58 9)   22  Ischemic colitis (557 9) (K55 9)   23  Laceration of calf (891 0) (S81 819A)   24  Laceration of lower extremity, right, initial encounter (894 0) (S81 811A)   25  Left carotid bruit (785 9) (R09 89)   26  Leukocytosis (288 60) (D72 829)   27  Limb pain (729 5) (M79 609)   28  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)   29  Osteopenia (733 90) (M85 80)   30  Osteoporosis (733 00) (M81 0)   31  Plantar fasciitis (728 71) (M72 2)   32  Preoperative evaluation to rule out surgical contraindication (V72 83) (Z01 818)   33  Renal artery stenosis (440 1) (I70 1)   34  Renovascular hypertension (405 91) (I15 0)   35  Right knee pain (719 46) (M25 561)   36  Routine Gynecological Exam With Cervical Pap Smear (V72 31)   37  Seasonal allergic rhinitis (477 9) (J30 2)   38  Urinary tract infection (599 0) (N39 0)    Past Medical History    1  History of Anal fissure (565 0) (K60 2)   2  History of Esophagitis, reflux (530 11) (K21 0)   3  History of  2 (V22 2) (Z33 1)   4  History of Hepatic hemangioma (228 04) (D18 03)   5  History of Herniated disc (722 2)   6  History of cardiac disorder (V12 50) (Z86 79)   7  History of colonic polyps (V12 72) (Z86 010)   8  History of hemorrhoids (V13 89) (Z87 19)   9   History of herpes zoster (V12 09) (Z86 19) 10  History of hypertension (V12 59) (Z86 79)   11  History of osteoarthritis (V13 4) (Z87 39)   12  History of Raynaud's syndrome (V12 59) (Z86 79)   13  History of Sjogren's disease (V13 59) (Z87 39)   14  History of Malignant neoplasm without specification of site (199 1) (C80 1)   15  History of Nephrolithiasis (V13 01)   16  History of Nontoxic single thyroid nodule (241 0) (E04 1)   17  History of OAB (overactive bladder) (596 51) (N32 81)   18  Personal history of respiratory system disease (V12 60) (Z87 09)   19  Personal history of spinal stenosis (V13 59) (Z87 39)   20  History of Screening for hypothyroidism (V77 0) (Z13 29)   21  History of PONCHO (stress urinary incontinence, female) (625 6) (N39 3)   22  History of Uterovaginal prolapse (618 4) (N81 4)  Active Problems And Past Medical History Reviewed: The active problems and past medical history were reviewed and updated today  Surgical History    1  History of Appendectomy   2  History of CABG   3  CABG   4  History of Cataract Surgery   5  History of Cath Stent Placement   6  History of Colonoscopy (Fiberoptic)   7  History of Complete Colonoscopy   8  History of Destruction Of Malignant Lesion Face   9  History of Diagnostic Esophagogastroduodenoscopy   10  History of Excision Of Lesion Scalp Malignant   11  History of Hemorrhoidectomy   12  History of Postoperative Thrombolysis PTCA   13  History of Renal Lithotripsy   14  History of Shoulder Excision Of Soft Tissue Tumor   15  History of Tonsillectomy  Surgical History Reviewed: The surgical history was reviewed and updated today  Family History    1  Family history of Heart Disease (V17 49)   2  Family history of Hypertension (V17 49)   3  Family history of Mother  At Age 80   4  Family history of Osteoporosis (V17 81)    5  Family history of Father  At Age 72   7  Family history of Heart Disease (V17 49)   7   Family history of Hypertension (V17 49)  Family History Reviewed: The family history was reviewed and updated today  Social History    · Caffeine Use   ·    · Never A Smoker   · Social alcohol use (F10 99)  Social History Reviewed: The social history was reviewed and updated today  The social history was reviewed and is unchanged  Current Meds   1  Adult Aspirin EC Low Strength 81 MG Oral Tablet Delayed Release; TAKE 1 TABLET   DAILY; Therapy: (Recorded:12Jan2015) to Recorded   2  AmLODIPine Besylate 5 MG Oral Tablet; TAKE 1 TABLET DAILY  Requested for:   81GZQ1306; Last Rx:22Apr2015 Ordered   3  Colace 100 MG Oral Capsule; TAKE 1 CAPSULE DAILY; Therapy: (Recorded:30Jun2015) to Recorded   4  Daily Multiple Vitamins Oral Tablet; Take 1 tablet daily Recorded   5  Effient 10 MG Oral Tablet; TAKE 10 MG Every other day  Requested for: 26XOM1029; Last   Rx:30Oct2015 Ordered   6  Isosorbide Mononitrate ER 60 MG Oral Tablet Extended Release 24 Hour; TAKE 1   TABLET ONCE DAILY; Therapy: 96JKR8329 to (Evaluate:14Npq2354)  Requested for: 40Zxp7640; Last   Rx:19Val1174 Ordered   7  Losartan Potassium 50 MG Oral Tablet; Take 1 tablet daily; Therapy: 48RSU1925 to (Evaluate:10Nov2016)  Requested for: 29UAF6324; Last   Rx:16Nov2015 Ordered   8  Metoprolol Succinate ER 50 MG Oral Tablet Extended Release 24 Hour; 50 mg am and   25 mg pm;   Therapy: 63RFS1101 to (Evaluate:19Gcs5021)  Requested for: 90SVX2671; Last   Rx:19Oct2015 Ordered   9  Nitrostat 0 4 MG Sublingual Tablet Sublingual; DISSOLVE 1 TABLET UNDER THE   TONGUE AS NEEDED FOR CHEST PAIN;   Therapy: 56KTE2835 to (Evaluate:54Fcv7815)  Requested for: 21VQK3113; Last   Rx:30Jun2015 Ordered   10  Pantoprazole Sodium 40 MG Oral Tablet Delayed Release; TAKE 1 TABLET DAILY; Therapy: 49Zrb3177 to (Evaluate:04Wuo6592)  Requested for: 38LJN5991 Recorded   11  Ranexa 500 MG Oral Tablet Extended Release 12 Hour; TAKE TABLET Daily; Therapy: (Recorded:30Jun2015) to Recorded   12   Torsemide 20 MG Oral Tablet; TAKE TABLET Every other day 1/2 tab; Therapy: (691 333 981) to Recorded   13  Vitamin D3 2000 UNIT Oral Capsule; TAKE 1 CAPSULE ONCE DAILY; Therapy: (Recorded:58Ohd8262) to Recorded  Medication List Reviewed: The medication list was reviewed and updated today  Allergies    1  Codeine Derivatives   2  Codeine Sulfate TABS   3  Sulfa Drugs    Vitals  Vital Signs [Data Includes: Current Encounter]    Recorded: 68XCN2966 11:08AM   Heart Rate 74   Systolic 038, LUE, Sitting   Diastolic 66, LUE, Sitting   BP Comments manual b/p   Height 4 ft 11 in   Weight 123 lb 8 oz   BMI Calculated 24 94   BSA Calculated 1 5     Physical Exam    Constitutional   General appearance: No acute distress, well appearing and well nourished  Eyes   Conjunctiva and lids: No swelling, erythema or discharge  Pupils and irises: Equal, round and reactive to light  Ears, Nose, Mouth, and Throat   External inspection of ears and nose: Normal     Oropharynx: Normal with no erythema, edema, exudate or lesions  Pulmonary   Respiratory effort: No increased work of breathing or signs of respiratory distress  Auscultation of lungs: Clear to auscultation  Cardiovascular   Palpation of heart: Normal PMI, no thrills  Auscultation of heart: Normal rate and rhythm, normal S1 and S2, without murmurs  Examination of extremities for edema and/or varicosities: Normal     Carotid pulses: Normal     Abdomen   Abdomen: Non-tender, no masses  Liver and spleen: No hepatomegaly or splenomegaly  Lymphatic   Palpation of lymph nodes in neck: No lymphadenopathy  Musculoskeletal   Gait and station: Normal     Digits and nails: Normal without clubbing or cyanosis  Inspection/palpation of joints, bones, and muscles: Normal     Skin   Skin and subcutaneous tissue: Normal without rashes or lesions      Psychiatric   Orientation to person, place, and time: Normal     Mood and affect: Normal          Health Management  Colon cancer screening   COLONOSCOPY; every 5 years; Last 15ZEA6226; Next Due: 11Jun2017; Active    Future Appointments    Date/Time Provider Specialty Site   04/25/2016 01:30 PM ADALBERTO Jorgensen  [address] [facility]   02/02/2016 01:40 PM ADALBERTO Ashraf   [facility]phrology [facility]     Signatures   Electronically signed by : ADALBERTO Spear ; Jan 22 2016 11:44AM EST                       (Author)

## 2018-01-15 NOTE — PROGRESS NOTES
Assessment    1  Renal artery stenosis (440 1) (I70 1)    Plan  Renovascular hypertension    · No return visit needed Evaluation and Treatment  Follow-up  Status: Complete  Done:  52FEZ0950   Ordered; For: Renovascular hypertension; Ordered By: Bassem Hale Performed:  Due: 60KPU1501    Discussion/Summary    Dustin presents for follow-up of renal artery stenosis  Recent angiogram, at the [address] [place] [address], revealed mild left renal artery stenosis and moderate right renal artery stenosis  I did have the angiogram reread by interventional radiology  Therefore, her angiogram showed less renal artery stenosis burden than the Doppler  In any case, her renal function remained stable and normal and her blood pressure is well-controlled  Therefore, no intervention is required at this time  Gloria Adhikari will follow-up with Dr Gm Sparks and if there are any further issues in the future, I would be more than happy to see her  Otherwise I have not reschedule an office visit at this time as Dr Gm Sparks is keeping an eye on her renal artery stenosis  The patient, patient's family was counseled regarding diagnostic results, instructions for management, risk factor reductions, impressions, risks and benefits of treatment options, importance of compliance with treatment  Reason For Visit  Gloria Adhikari follows up for renal artery stenosis  History of Present Illness  Gloria Adhikari was recently hospitalized and it was in question of mesenteric ischemia  She is scheduled for an EGD and colonoscopy with GI  She does have occasional abdominal discomfort when taking her pills on an empty stomach  She has noted that her peripheral edema has improved when she is lying in bed and lying flat as opposed to lying in her recliner  Blood pressures have been controlled as evidence by her recent physician office visits  Review of Systems    Constitutional: fatigue, but no fever, no chills and no anorexia  Integumentary: no rashes     Gastrointestinal: abdominal pain and constipation, but no nausea, no diarrhea and no vomiting  Respiratory: No complaints of shortness of breath, no cough, no productive sputum  Cardiovascular: no orthopnea, no PND and no chest pain  Musculoskeletal: joint pain and Knee pain  Neurological: No complaints of headache, no lightheadedness or dizziness  Genitourinary: no dysuria, no hematuria and no change in urinary frequency  Psychiatric: no sleep disturbances  Social History  The social history was reviewed and is unchanged  Current Meds   1  Adult Aspirin EC Low Strength 81 MG Oral Tablet Delayed Release; TAKE 1 TABLET   DAILY; Therapy: (Recorded:12Jan2015) to Recorded   2  AmLODIPine Besylate 5 MG Oral Tablet; TAKE 1 TABLET DAILY  Requested for:   17IZO7244; Last Rx:22Apr2015 Ordered   3  Colace 100 MG Oral Capsule; TAKE 1 CAPSULE DAILY; Therapy: (Recorded:30Jun2015) to Recorded   4  Daily Multiple Vitamins Oral Tablet; Take 1 tablet daily Recorded   5  Effient 10 MG Oral Tablet; TAKE 10 MG Every other day  Requested for: 87QDE9468; Last   Rx:30Oct2015 Ordered   6  Golytely 227 1 GM Oral Solution Reconstituted; TAKE AS DIRECTED; Therapy: 91XHA0573 to (Last Rx:22Jan2016)  Requested for: 86OVQ0003 Ordered   7  Isosorbide Mononitrate ER 60 MG Oral Tablet Extended Release 24 Hour; TAKE 1   TABLET ONCE DAILY; Therapy: 85LFZ7473 to (Evaluate:08Shw3191)  Requested for: 12Bks2406; Last   Rx:01Uus3207 Ordered   8  Losartan Potassium 50 MG Oral Tablet; Take 1 tablet daily; Therapy: 94EPH6106 to (Evaluate:10Nov2016)  Requested for: 70GXT3770; Last   Rx:16Nov2015 Ordered   9  Metoprolol Succinate ER 50 MG Oral Tablet Extended Release 24 Hour; 50 mg am and   25 mg pm;   Therapy: 12YIL9141 to (Evaluate:36Zoo7993)  Requested for: 80IKG5518; Last   Rx:19Oct2015 Ordered   10   Nitrostat 0 4 MG Sublingual Tablet Sublingual; DISSOLVE 1 TABLET UNDER THE    TONGUE AS NEEDED FOR CHEST PAIN;    Therapy: 55MRQ2377 to (Evaluate:91Jjh7396)  Requested for: 70GHR0917; Last    Rx:30Jun2015 Ordered   11  Pantoprazole Sodium 40 MG Oral Tablet Delayed Release; TAKE 1 TABLET DAILY; Therapy: 06Oxn8807 to (Evaluate:96Byi7412)  Requested for: 20IIY6190 Recorded   12  Ranexa 500 MG Oral Tablet Extended Release 12 Hour; TAKE TABLET Daily; Therapy: (Recorded:30Jun2015) to Recorded   13  Torsemide 20 MG Oral Tablet; TAKE TABLET Every other day 1/2 tab; Therapy: (Ronnette Cheadle) to Recorded   14  Vitamin D3 2000 UNIT Oral Capsule; TAKE 1 CAPSULE ONCE DAILY; Therapy: (Recorded:12Jan2015) to Recorded    The medication list was reviewed and updated today  Allergies    1  Codeine Derivatives   2  Codeine Sulfate TABS   3  Sulfa Drugs    Vitals  Vital Signs [Data Includes: Current Encounter]    ** Printed in Appendix #1 below  Physical Exam    Constitutional: General appearance: No acute distress, well appearing and well nourished  ENT: External ears and nose appear normal      Eyes: Anicteric sclerae  Neck: No bruit heard over either carotid  JVD:  No JVD present  Pulmonary: Respiratory effort: No increased work of breathing or signs of respiratory distress  Auscultation of lungs: Clear to auscultation  Cardiovascular: Auscultation of heart: Abnormal   soft systolic murmur was noted  Abdomen: Non-tender, no masses  Extremities: No cyanosis, clubbing or edema  Pulses: Dorsalis Pedis and Posterior Tibial pulses normal    Rash: No rash present  Neurologic: Non Focal      Psychiatric: Orientation to person, place, and time: Normal   and Mood and affect: Normal     Back: No CVA tenderness  Health Management  Colon cancer screening   COLONOSCOPY; every 5 years; Last 83LUN6306; Next Due: 11Jun2017; Active    Thank you very much for allowing me to participate in the care of this patient  If you have any questions, please do not hesitate to contact me        Future Appointments    Date/Time Provider Specialty Site   2016 01:30 PM ADALBERTO Joya  [address] [facility]   2016 10:45 AM ADALBERTO Bonilla   Gastroenterology Adult [address] [facility]     Signatures   Electronically signed by : ADALBERTO Louie ; 2016  2:11PM EST                       (Author)    Appendix #1     Vital Signs Stacy Byrne Includes: Current Encounter]   Patient: Adiel Gil ; : 1940; MRN: [id number]      Recorded: 90NNJ9870 01:47PM Recorded: 25WOT7197 01:46PM Recorded: 22PIR4460 01:43PM   Heart Rate  72    Systolic 702, RUE, Standing 138, RUE, Sitting    Diastolic 70, RUE, Standing 70, RUE, Sitting    Height   4 ft 11 in   Weight   124 lb 6 08 oz   BMI Calculated   25 12   BSA Calculated   1 51

## 2018-01-17 NOTE — MISCELLANEOUS
History of Present Illness  TCM Communication [facility]: The patient is being contacted for follow-up after hospitalization  Hospital records were reviewed  She was hospitalized at [facility]  The dates of hospitalization: 8/22/2017 - 8/25/2017  Diagnosis: COLITIS, RECTAL BLEEDING, CAD  She was discharged to home  She did not schedule a follow up appointment  Follow-up appointments with other specialists: WILL FOLLOW UP WITH DR Janis Durbin  Symptoms: loose stools, but no fever, no weakness, no dizziness, no headache, no fatigue, no cough, no shortness of breath, no chest pain, no back pain on left side, no back pain on right side, no arm pain left side, no arm pain on right side, no leg pain on left side, no leg pain on right side, no upper abdominal pain, no middle abdominal pain, no lower abdominal pain, no rash:, no anorexia, no vomiting, no constipation, no pain with urinating, no incisional pain, no wound drainage and no swelling  Counseling was provided to the patient  Communication performed and completed by lc      Active Problems    1  Anxiety disorder (300 00) (F41 9)   2  Arthralgia of temporomandibular joint (524 62) (M26 629)   3  Arthritis of knee, right (716 96) (M17 11)   4  Atherosclerosis of both lower extremities with intermittent claudication (440 21) (I70 213)   5  Breast cancer screening (V76 10) (Z12 39)   6  CABG   7  Cardiac angina (413 9) (I20 9)   8  Carotid artery bruit (785 9) (R09 89)   9  Carotid artery stenosis (433 10) (I65 29)   10  Cataract of both eyes (366 9) (H26 9)   11  Colitis (558 9) (K52 9)   12  Colon cancer screening (V76 51) (Z12 11)   13  Coronary artery arteriosclerosis (414 00) (I25 10)   14  Costochondritis (733 6) (M94 0)   15  Deep vein thrombosis of bilateral lower extremities (453 40) (I82 403)   16  Dyspepsia (536 8) (K30)   17  Elevated glucose (790 29) (R73 09)   18  Encounter for screening mammogram for malignant neoplasm of breast (V76 12)    (Z12 31)   19  Esophageal reflux (530 81) (K21 9)   20  Essential hypertension (401 9) (I10)   21  Heart disease (429 9) (I51 9)   22  Hyperlipidemia (272 4) (E78 5)   23  Intermittent claudication (443 9) (I73 9)   24  Irritable bowel syndrome (564 1) (K58 9)   25  Ischemic colitis (557 9) (K55 9)   26  Laceration of calf (891 0) (S81 819A)   27  Laceration of lower extremity, right, initial encounter (894 0) (S81 811A)   28  Left carotid bruit (785 9) (R09 89)   29  Leukocytosis (288 60) (D72 829)   30  Limb pain (729 5) (M79 609)   31  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)   32  Nasal congestion (478 19) (R09 81)   33  Osteopenia (733 90) (M85 80)   34  Osteoporosis (733 00) (M81 0)   35  Plantar fasciitis (728 71) (M72 2)   36  Preoperative evaluation to rule out surgical contraindication (V72 83) (Z01 818)   37  Renal artery stenosis (440 1) (I70 1)   38  Renovascular hypertension (405 91) (I15 0)   39  Right knee pain (719 46) (M25 561)   40  Seasonal allergic rhinitis (477 9) (J30 2)   41  Sore throat (462) (J02 9)   42  Urinary tract infection (599 0) (N39 0)   43  History of Visit for routine gyn exam (V72 31) (Z01 419)    Past Medical History    1  History of Anal fissure (565 0) (K60 2)   2  History of Esophagitis, reflux (530 11) (K21 0)   3  History of  2 (V22 2) (Z33 1)   4  History of Hepatic hemangioma (228 04) (D18 03)   5  History of Herniated disc (722 2)   6  History of cardiac disorder (V12 50) (Z86 79)   7  History of colonic polyps (V12 72) (Z86 010)   8  History of hemorrhoids (V13 89) (Z87 19)   9  History of herpes zoster (V12 09) (Z86 19)   10  History of hypertension (V12 59) (Z86 79)   11  History of osteoarthritis (V13 4) (Z87 39)   12  History of Raynaud's syndrome (V12 59) (Z86 79)   13  History of Sjogren's disease (V13 59) (Z87 39)   14  History of Influenza vaccination administered at current visit (V04 81) (Z23)   15   History of Malignant neoplasm without specification of site (199 1) (C80 1)   16  History of Need for vaccination with 13-polyvalent pneumococcal conjugate vaccine    (V03 82) (Z23)   17  History of Nephrolithiasis (V13 01)   18  History of Nontoxic single thyroid nodule (241 0) (E04 1)   19  History of OAB (overactive bladder) (596 51) (N32 81)   20  Personal history of respiratory system disease (V12 60) (Z87 09)   21  Personal history of spinal stenosis (V13 59) (Z87 39)   22  History of Screening for hypothyroidism (V77 0) (Z13 29)   23  History of PONCHO (stress urinary incontinence, female) (625 6) (N39 3)   24  History of Uterovaginal prolapse (618 4) (N81 4)   25  History of Visit for routine gyn exam (V72 31) (Z01 419)    Surgical History    1  History of Appendectomy   2  History of CABG   3  CABG   4  History of Cataract Surgery   5  History of Cath Stent Placement   6  History of Colonoscopy (Fiberoptic)   7  History of Complete Colonoscopy   8  History of Destruction Of Malignant Lesion Face   9  History of Diagnostic Esophagogastroduodenoscopy   10  History of Excision Of Lesion Scalp Malignant   11  History of Hemorrhoidectomy   12  History of Knee Surgery   13  History of Postoperative Thrombolysis PTCA   14  History of Renal Lithotripsy   15  History of Shoulder Excision Of Soft Tissue Tumor   16  History of Tonsillectomy    Family History  Mother    1  Denied: Family history of Colon cancer   2  Denied: Family history of Crohn's disease without complication, unspecified   gastrointestinal tract location   3  Denied: Family history of liver disease   4  Family history of Heart Disease (V17 49)   5  Family history of Hypertension (V17 49)   6  Family history of Mother  At Age 80   9  Family history of Osteoporosis (V17 81)  Father    6  Denied: Family history of Colon cancer   9  Denied: Family history of Crohn's disease without complication, unspecified   gastrointestinal tract location   10  Denied: Family history of liver disease   11   Family history of Father  At Age 76   12  Family history of Heart Disease (V17 49)   13  Family history of Hypertension (V17 49)  Family History    15  Family history of colitis (V18 59) (Z83 79)   15  Family history of colonic polyps (V18 51) (Z83 71)    Social History    · Activities: Golf   · Caffeine Use   · Consumes healthy diet (V49 89)   · Daily caffeinated coffee consumption   · Drinks caffeinated tea   ·    · Never A Smoker   · No illicit drug use   · Not currently sexually active   · Retired from employment   · Social alcohol use (Z78 9)   · Well balanced diet (V49 89) (Z78 9)    Current Meds   1  Adult Aspirin EC Low Strength 81 MG Oral Tablet Delayed Release; TAKE 1 TABLET   DAILY; Therapy: (Recorded:2015) to Recorded   2  AmLODIPine Besylate 5 MG Oral Tablet; TAKE 1 TABLET DAILY  Requested for:   11RIC6506; Last Rx:2017 Ordered   3  Co Q-10 200 MG Oral Capsule; Therapy: (Recorded:10Yti8539) to Recorded   4  Colace 100 MG Oral Capsule; TAKE 1 CAPSULE DAILY; Therapy: (Recorded:2015) to Recorded   5  Daily Multiple Vitamins Oral Tablet; Take 1 tablet daily Recorded   6  Effient 10 MG Oral Tablet; TAKE 10 MG Every other day  Requested for: 10QIL7986; Last   Rx:2016 Ordered   7  Isosorbide Mononitrate ER 60 MG Oral Tablet Extended Release 24 Hour; TAKE 1   TABLET ONCE DAILY; Therapy: 57UJY3554 to (Evaluate:93Bzr3099)  Requested for: 36Vgz8853; Last   Rx:04Fzo7501 Ordered   8  Losartan Potassium 50 MG Oral Tablet; take 1 tablet by mouth once daily; Therapy: 21VJA8083 to (Lysbeth Mars)  Requested for: 79RSQ4191; Last   Rx:2017 Ordered   9  Metoprolol Succinate ER 25 MG Oral Tablet Extended Release 24 Hour; Take 1 tablet   twice daily  Requested for: 17OMX4800; Last Rx:2016; Status: ACTIVE - Transmit to   Wen Verification Ordered   10   Nitroglycerin 0 4 MG Sublingual Tablet Sublingual; DISSOLVE 1 TABLET UNDER THE    TONGUE AS NEEDED FOR CHEST PAIN; Therapy: 85YML7049 to (Evaluate:56Qtc8306)  Requested for: 10AZR6903; Last    Rx:13Mar2017 Ordered   11  Pantoprazole Sodium 40 MG Oral Tablet Delayed Release; TAKE 1 TABLET DAILY; Therapy: 63Pjd7086 to (Evaluate:57Tlf8283)  Requested for: 01RKN6565 Recorded   12  Ranexa 500 MG Oral Tablet Extended Release 12 Hour; TAKE 1 TABLET EVERY 12    HOURS  Requested for: 68Gst3075; Last Rx:56Der9652 Ordered   13  Torsemide 20 MG Oral Tablet; TAKE TABLET  0 5 tab q o d; Therapy: 11ZKF9800 to Recorded   14  Vitamin D3 2000 UNIT Oral Capsule; TAKE 1 CAPSULE ONCE DAILY; Therapy: (Recorded:12Jan2015) to Recorded    Allergies    1  Codeine Derivatives   2  Codeine Sulfate TABS   3  Sulfa Drugs    Health Management  Colon cancer screening   COLONOSCOPY; every 5 years; Last 28Ufm2218; Next Due: 27Fei9312; Active    Future Appointments    Date/Time Provider Specialty Site   06/27/2018 01:00 PM ADALBERTO Rabago   [address] [facility]     Signatures   Electronically signed by : ADALBERTO Knox ; Sep 10 2017 10:48PM EST

## 2018-05-29 ENCOUNTER — OFFICE VISIT (OUTPATIENT)
Dept: CARDIOLOGY CLINIC | Facility: CLINIC | Age: 78
End: 2018-05-29
Payer: MEDICARE

## 2018-05-29 VITALS
HEART RATE: 84 BPM | HEIGHT: 58 IN | BODY MASS INDEX: 27.71 KG/M2 | SYSTOLIC BLOOD PRESSURE: 108 MMHG | WEIGHT: 132 LBS | DIASTOLIC BLOOD PRESSURE: 70 MMHG

## 2018-05-29 DIAGNOSIS — I25.10 CORONARY ARTERIOSCLEROSIS: ICD-10-CM

## 2018-05-29 DIAGNOSIS — I10 ESSENTIAL (PRIMARY) HYPERTENSION: Primary | ICD-10-CM

## 2018-05-29 DIAGNOSIS — Z95.1 S/P CABG (CORONARY ARTERY BYPASS GRAFT): ICD-10-CM

## 2018-05-29 DIAGNOSIS — I10 HYPERTENSION, UNSPECIFIED TYPE: Primary | ICD-10-CM

## 2018-05-29 DIAGNOSIS — E78.00 PURE HYPERCHOLESTEROLEMIA: ICD-10-CM

## 2018-05-29 PROCEDURE — 99214 OFFICE O/P EST MOD 30 MIN: CPT | Performed by: INTERNAL MEDICINE

## 2018-05-29 PROCEDURE — 93000 ELECTROCARDIOGRAM COMPLETE: CPT | Performed by: INTERNAL MEDICINE

## 2018-05-29 RX ORDER — FLUTICASONE PROPIONATE 50 MCG
1 SPRAY, SUSPENSION (ML) NASAL 2 TIMES DAILY
COMMUNITY
Start: 2017-11-10 | End: 2019-01-28 | Stop reason: ALTCHOICE

## 2018-05-29 RX ORDER — LOSARTAN POTASSIUM 50 MG/1
50 TABLET ORAL DAILY
Qty: 30 TABLET | Refills: 11 | Status: SHIPPED | OUTPATIENT
Start: 2018-05-29 | End: 2019-06-06 | Stop reason: SDUPTHER

## 2018-05-29 NOTE — PROGRESS NOTES
Cardiology Follow Up    Rylee Rossi  1940  [id number]  [address] [facility]  [address] [place] [address] [address]    1  Essential (primary) hypertension     2  Pure hypercholesterolemia     3  Coronary arteriosclerosis     4  S/P CABG (coronary artery bypass graft)         Interval History:  Patient is here for a follow-up visit  She was last seen by me in November of last year  Since her last visit with me she had a carotid Doppler done in December which demonstrated a 50-69% stenosis bilaterally  There was no change compared to a prior study done March 9, 2016  An echocardiogram done in April of last year demonstrated preserved LV systolic function with mild mitral and tricuspid regurgitation with no significant interval change noted compared to a prior study done November 2016  She has a prior history of CABG x4 in July 2011 with closure of the LIMA to the LAD documented thereafter and revascularization of the LAD in diagonal branch with PCI  Her most recent catheterization was done in December 2012 which demonstrated recurrent disease which is being managed medically given her anatomy  The patient's most recent pharmacologic nuclear stress test done September 2015 demonstrated a small apical infarct without evidence of prognostically important ischemia  She has been feeling well  She has had no chest pain or significant dyspnea  Her EKG today looks baseline demonstrating sinus rhythm with a left bundle branch block      Patient Active Problem List   Diagnosis    CAD (coronary artery disease)    HTN (hypertension)    GERD (gastroesophageal reflux disease)    Colitis     Past Medical History:   Diagnosis Date    Anal fissure     Cardiac disorder     Esophageal reflux     Esophagitis, reflux     Hemorrhoids     Hepatic hemangioma     Last Assessed: 1/13/2015    Herpes zoster     History of colonic polyps     Hypertension     [facility]schemic colitis ([facility] )     Lumbar herniated disc     Malignant neoplasm without specification of site ([place] [address] )     Nephrolithiasis     L   Lithotripsy    Nontoxic single thyroid nodule     Last Assessed: 1/13/2015    Osteoarthritis     Overactive bladder     Raynaud disease     Respiratory system disease     Sjogren's disease ([facility] )     Spinal stenosis     PONCHO (stress urinary incontinence, female)     Uterovaginal prolapse     Grade I-II     Social History     Social History    Marital status: /Civil Union     Spouse name: N/A    Number of children: N/A    Years of education: N/A     Occupational History    retired      Social History Main Topics    Smoking status: Never Smoker    Smokeless tobacco: Not on file    Alcohol use Yes      Comment: social    Drug use: No    Sexual activity: Not Currently     Other Topics Concern    Not on file     Social History Narrative    Activities: golf    Caffeine use    Consumes healthy diet    Daily caffeinated coffee consumption: 1 cup per day    Drinks caffeinated tea: 1 cup per day    Well balanced diet          Family History   Problem Relation Age of Onset    Heart disease Mother     Hypertension Mother     Osteoporosis Mother     Heart disease Father     Hypertension Father     Ulcerative colitis Family     Colon polyps Family      Past Surgical History:   Procedure Laterality Date    APPENDECTOMY  1947    CARDIAC SURGERY      CABG    CATARACT EXTRACTION Bilateral     COLONOSCOPY  2012    Fiberoptic    COLONOSCOPY      Resolved: 2006 - 2012 5 year f/u    CORONARY ANGIOPLASTY WITH STENT PLACEMENT      CORONARY ARTERY BYPASS GRAFT      Resolved: 2012    ESOPHAGOGASTRODUODENOSCOPY  2012    Diagnostic    HEMORROIDECTOMY      KNEE SURGERY      LITHOTRIPSY      Renal    MALIGNANT SKIN LESION EXCISION      Face; Resolved: 2004    IL ESOPHAGOGASTRODUODENOSCOPY TRANSORAL DIAGNOSTIC N/A 4/13/2016    Procedure: EGD AND COLONOSCOPY;  Surgeon: Clayton Dubois MD;  [facility]; Service: Gastroenterology    RENAL ARTERY STENT      SKIN LESION EXCISION      Scalp    SOFT TISSUE TUMOR RESECTION      Shoulder; Resolved: 1995    THROMBOLYSIS      Postoperative Thrombolysis PTCA    TONSILLECTOMY      Resolved: 1944       Current Outpatient Prescriptions:     amLODIPine (NORVASC) 5 mg tablet, Take 5 mg by mouth daily  , Disp: , Rfl:     aspirin (ECOTRIN LOW STRENGTH) 81 mg EC tablet, Take 81 mg by mouth daily  , Disp: , Rfl:     Cholecalciferol (VITAMIN D3 PO), Take 2,000 Units by mouth daily  , Disp: , Rfl:     Coenzyme Q10 (CO Q-10) 200 MG CAPS, Take 1 capsule by mouth daily, Disp: , Rfl:     Docusate Sodium (COLACE PO), Take by mouth daily  , Disp: , Rfl:     isosorbide mononitrate (IMDUR) 60 mg 24 hr tablet, Take 60 mg by mouth daily  , Disp: , Rfl:     losartan (COZAAR) 50 mg tablet, Take 50 mg by mouth daily  , Disp: , Rfl:     metoprolol succinate (TOPROL-XL) 25 mg 24 hr tablet, Take 25 mg by mouth daily, Disp: , Rfl:     multivitamin (THERAGRAN) TABS, Take 1 tablet by mouth daily  , Disp: , Rfl:     nitroglycerin (NITROSTAT) 0 4 mg SL tablet, Place 0 4 mg under the tongue every 5 (five) minutes as needed for chest pain , Disp: , Rfl:     pantoprazole (PROTONIX) 40 mg tablet, Take 40 mg by mouth daily  , Disp: , Rfl:     polyethylene glycol (GLYCOLAX) powder, Take 17 g by mouth daily, Disp: 30 g, Rfl: 0    Potassium Chloride Jeanine CR (KLOR-CON M20 PO), Take 1 tablet by mouth daily, Disp: , Rfl:     prasugrel (EFFIENT) tablet, Take 10 mg by mouth every other day, Disp: , Rfl:     ranolazine (RANEXA) 500 mg 12 hr tablet, Take 500 mg by mouth daily  , Disp: , Rfl:     torsemide (DEMADEX) 20 mg tablet, Take 20 mg by mouth daily, Disp: , Rfl:   Allergies   Allergen Reactions    Sulfa Antibiotics Anaphylaxis    Formaldehyde     Codeine Palpitations       Labs:not applicable  Imaging: No results found     Review of Systems:  Review of Systems   All other systems reviewed and are negative  Physical Exam:  Physical Exam   Constitutional: She is oriented to person, place, and time  She appears well-developed and well-nourished  HENT:   Head: Normocephalic and atraumatic  Eyes: Conjunctivae and EOM are normal  Pupils are equal, round, and reactive to light  Neck: Normal range of motion  Neck supple  Cardiovascular: Normal rate and normal heart sounds  Pulmonary/Chest: Effort normal and breath sounds normal    Neurological: She is alert and oriented to person, place, and time  Skin: Skin is warm and dry  Psychiatric: She has a normal mood and affect  Vitals reviewed  Discussion/Summary:I will continue the patient's present medical regimen  The patient appears well compensated  I have asked the patient to call if there is a problem in the interim otherwise I will see the patient in six months time

## 2018-05-30 DIAGNOSIS — I25.10 CORONARY ARTERY DISEASE INVOLVING NATIVE HEART WITHOUT ANGINA PECTORIS, UNSPECIFIED VESSEL OR LESION TYPE: Primary | ICD-10-CM

## 2018-05-30 RX ORDER — METOPROLOL SUCCINATE 25 MG/1
25 TABLET, EXTENDED RELEASE ORAL DAILY
Qty: 30 TABLET | Refills: 11 | Status: SHIPPED | OUTPATIENT
Start: 2018-05-30 | End: 2019-06-06 | Stop reason: SDUPTHER

## 2018-06-27 ENCOUNTER — OFFICE VISIT (OUTPATIENT)
Dept: FAMILY MEDICINE CLINIC | Facility: MEDICAL CENTER | Age: 78
End: 2018-06-27
Payer: MEDICARE

## 2018-06-27 VITALS
WEIGHT: 131.8 LBS | HEART RATE: 72 BPM | BODY MASS INDEX: 27.55 KG/M2 | DIASTOLIC BLOOD PRESSURE: 60 MMHG | RESPIRATION RATE: 16 BRPM | SYSTOLIC BLOOD PRESSURE: 122 MMHG

## 2018-06-27 DIAGNOSIS — Z12.39 SCREENING FOR BREAST CANCER: Primary | ICD-10-CM

## 2018-06-27 DIAGNOSIS — Z13.820 SCREENING FOR OSTEOPOROSIS: ICD-10-CM

## 2018-06-27 DIAGNOSIS — I10 ESSENTIAL HYPERTENSION: Chronic | ICD-10-CM

## 2018-06-27 DIAGNOSIS — N81.4 UTERINE PROLAPSE: ICD-10-CM

## 2018-06-27 DIAGNOSIS — H91.91 HEARING LOSS OF RIGHT EAR, UNSPECIFIED HEARING LOSS TYPE: ICD-10-CM

## 2018-06-27 DIAGNOSIS — Z01.411 ENCOUNTER FOR GYNECOLOGICAL EXAMINATION WITH ABNORMAL FINDING: ICD-10-CM

## 2018-06-27 DIAGNOSIS — R41.3 MEMORY CHANGES: ICD-10-CM

## 2018-06-27 PROCEDURE — 99215 OFFICE O/P EST HI 40 MIN: CPT | Performed by: FAMILY MEDICINE

## 2018-06-27 NOTE — PROGRESS NOTES
Marcin Barrera is doing well  Here for her Gyn checkup  She complains of some hearing loss  She has a history of tympanostomy tube placed in January  Hearing got better  Saw Dr Freeman jeffries   She says her  says her memory is getting bad  She disagrees  Does finances, check book, uses computer  [place] at her Spiritism  I have reviewed the patient's medical history in detail and updated the computerized patient record  Review of Systems   Genitourinary: Negative for decreased urine volume, difficulty urinating, dysuria, frequency, urgency, vaginal bleeding, vaginal discharge and vaginal pain  Marcin Barrear thinks her uterine prolapse is worse  She notices it with sitting or riding in a car  She thinks she had a pessary in the past which  didn't work   She is ready to get something done  Did not get mammogram and Dexa scan last year  O: /60 (Cuff Size: Standard)   Pulse 72   Resp 16   Wt 59 8 kg (131 lb 12 8 oz)   BMI 27 55 kg/m²   Physical Exam   Constitutional: She appears well-developed and well-nourished  No distress  Neck: Carotid bruit is not present  No thyromegaly present  Cardiovascular: Normal rate, regular rhythm, normal heart sounds and intact distal pulses  Pulmonary/Chest: Effort normal and breath sounds normal    Abdominal: Soft  Bowel sounds are normal  She exhibits no mass  There is no hepatomegaly  There is no tenderness  Musculoskeletal: She exhibits no edema  Lymphadenopathy:     She has no cervical adenopathy  Breasts no masses or discharge  External genitalia normal  Vagina normal  Cervix normal no lesions  Uterus present at the introitus ; cervix extruding  Adnexae non palpable  Rectal exam no masses stool  heme negative    Assessment  1  Gyn exam   2  Uterine prolapse-progressing  She is interested in pursuing surgery at this point  Refer back to Dr Adrienne Andrade who she saw initially  3   Hearing loss-has had eustachian tube dysfunction with tympanostomy tubes  Will refer back to ENT  4  Memory changes-noted by family member  Will do mini-mental status exam   5   Hyperlipidemia-due for blood work6  HTN-controlled    Plan  Check mammogram, DEXA scan, blood work  Referral to gyn    The mini-mental status exam   Further workup if appropriate as above

## 2018-07-08 DIAGNOSIS — I25.10 CORONARY ARTERIOSCLEROSIS: Primary | ICD-10-CM

## 2018-07-08 RX ORDER — RANOLAZINE 500 MG/1
TABLET, FILM COATED, EXTENDED RELEASE ORAL
Qty: 60 TABLET | Refills: 5 | Status: SHIPPED | OUTPATIENT
Start: 2018-07-08 | End: 2019-10-07

## 2018-07-30 ENCOUNTER — APPOINTMENT (OUTPATIENT)
Dept: LAB | Facility: MEDICAL CENTER | Age: 78
End: 2018-07-30
Payer: MEDICARE

## 2018-07-30 DIAGNOSIS — R41.3 MEMORY CHANGES: ICD-10-CM

## 2018-07-30 DIAGNOSIS — Z13.820 SCREENING FOR OSTEOPOROSIS: ICD-10-CM

## 2018-07-30 DIAGNOSIS — I10 ESSENTIAL HYPERTENSION: Chronic | ICD-10-CM

## 2018-07-30 DIAGNOSIS — Z01.411 ENCOUNTER FOR GYNECOLOGICAL EXAMINATION WITH ABNORMAL FINDING: ICD-10-CM

## 2018-07-30 DIAGNOSIS — Z12.39 SCREENING FOR BREAST CANCER: ICD-10-CM

## 2018-07-30 LAB
ALBUMIN SERPL BCP-MCNC: 3.5 G/DL (ref 3.5–5)
ALP SERPL-CCNC: 76 U/L (ref 46–116)
ALT SERPL W P-5'-P-CCNC: 23 U/L (ref 12–78)
ANION GAP SERPL CALCULATED.3IONS-SCNC: 6 MMOL/L (ref 4–13)
AST SERPL W P-5'-P-CCNC: 19 U/L (ref 5–45)
BASOPHILS # BLD AUTO: 0.02 THOUSANDS/ΜL (ref 0–0.1)
BASOPHILS NFR BLD AUTO: 0 % (ref 0–1)
BILIRUB SERPL-MCNC: 0.91 MG/DL (ref 0.2–1)
BUN SERPL-MCNC: 13 MG/DL (ref 5–25)
CALCIUM SERPL-MCNC: 8.9 MG/DL (ref 8.3–10.1)
CHLORIDE SERPL-SCNC: 108 MMOL/L (ref 100–108)
CHOLEST SERPL-MCNC: 227 MG/DL (ref 50–200)
CO2 SERPL-SCNC: 27 MMOL/L (ref 21–32)
CREAT SERPL-MCNC: 0.8 MG/DL (ref 0.6–1.3)
EOSINOPHIL # BLD AUTO: 0.11 THOUSAND/ΜL (ref 0–0.61)
EOSINOPHIL NFR BLD AUTO: 2 % (ref 0–6)
ERYTHROCYTE [DISTWIDTH] IN BLOOD BY AUTOMATED COUNT: 13.2 % (ref 11.6–15.1)
GFR SERPL CREATININE-BSD FRML MDRD: 71 ML/MIN/1.73SQ M
GLUCOSE P FAST SERPL-MCNC: 79 MG/DL (ref 65–99)
HCT VFR BLD AUTO: 41.3 % (ref 34.8–46.1)
HDLC SERPL-MCNC: 56 MG/DL (ref 40–60)
HGB BLD-MCNC: 12.6 G/DL (ref 11.5–15.4)
IMM GRANULOCYTES # BLD AUTO: 0.01 THOUSAND/UL (ref 0–0.2)
IMM GRANULOCYTES NFR BLD AUTO: 0 % (ref 0–2)
LDLC SERPL CALC-MCNC: 147 MG/DL (ref 0–100)
LYMPHOCYTES # BLD AUTO: 1.07 THOUSANDS/ΜL (ref 0.6–4.47)
LYMPHOCYTES NFR BLD AUTO: 23 % (ref 14–44)
MCH RBC QN AUTO: 29.5 PG (ref 26.8–34.3)
MCHC RBC AUTO-ENTMCNC: 30.5 G/DL (ref 31.4–37.4)
MCV RBC AUTO: 97 FL (ref 82–98)
MONOCYTES # BLD AUTO: 0.37 THOUSAND/ΜL (ref 0.17–1.22)
MONOCYTES NFR BLD AUTO: 8 % (ref 4–12)
NEUTROPHILS # BLD AUTO: 3.13 THOUSANDS/ΜL (ref 1.85–7.62)
NEUTS SEG NFR BLD AUTO: 67 % (ref 43–75)
NONHDLC SERPL-MCNC: 171 MG/DL
NRBC BLD AUTO-RTO: 0 /100 WBCS
PLATELET # BLD AUTO: 168 THOUSANDS/UL (ref 149–390)
PMV BLD AUTO: 11 FL (ref 8.9–12.7)
POTASSIUM SERPL-SCNC: 4.3 MMOL/L (ref 3.5–5.3)
PROT SERPL-MCNC: 7 G/DL (ref 6.4–8.2)
RBC # BLD AUTO: 4.27 MILLION/UL (ref 3.81–5.12)
SODIUM SERPL-SCNC: 141 MMOL/L (ref 136–145)
TRIGL SERPL-MCNC: 122 MG/DL
TSH SERPL DL<=0.05 MIU/L-ACNC: 1.15 UIU/ML (ref 0.36–3.74)
WBC # BLD AUTO: 4.71 THOUSAND/UL (ref 4.31–10.16)

## 2018-07-30 PROCEDURE — 85025 COMPLETE CBC W/AUTO DIFF WBC: CPT

## 2018-07-30 PROCEDURE — 80061 LIPID PANEL: CPT

## 2018-07-30 PROCEDURE — 80053 COMPREHEN METABOLIC PANEL: CPT

## 2018-07-30 PROCEDURE — 36415 COLL VENOUS BLD VENIPUNCTURE: CPT

## 2018-07-30 PROCEDURE — 84443 ASSAY THYROID STIM HORMONE: CPT

## 2018-08-02 DIAGNOSIS — I25.10 CORONARY ARTERIOSCLEROSIS: Primary | ICD-10-CM

## 2018-08-02 RX ORDER — ISOSORBIDE MONONITRATE 60 MG/1
TABLET, EXTENDED RELEASE ORAL
Qty: 90 TABLET | Refills: 2 | Status: ON HOLD | OUTPATIENT
Start: 2018-08-02 | End: 2019-08-06 | Stop reason: SDUPTHER

## 2018-10-05 ENCOUNTER — OFFICE VISIT (OUTPATIENT)
Dept: OBGYN CLINIC | Facility: MEDICAL CENTER | Age: 78
End: 2018-10-05
Payer: MEDICARE

## 2018-10-05 VITALS
WEIGHT: 133 LBS | HEIGHT: 58 IN | SYSTOLIC BLOOD PRESSURE: 162 MMHG | DIASTOLIC BLOOD PRESSURE: 80 MMHG | BODY MASS INDEX: 27.92 KG/M2

## 2018-10-05 DIAGNOSIS — Z95.820 S/P PERCUTANEOUS TRANSLUMINAL ANGIOPLASTY (PTA) WITH STENT PLACEMENT: Primary | ICD-10-CM

## 2018-10-05 DIAGNOSIS — N81.4 UTEROVAGINAL PROLAPSE: Primary | ICD-10-CM

## 2018-10-05 PROCEDURE — 99203 OFFICE O/P NEW LOW 30 MIN: CPT | Performed by: OBSTETRICS & GYNECOLOGY

## 2018-10-05 RX ORDER — CLINDAMYCIN HYDROCHLORIDE 300 MG/1
CAPSULE ORAL
Refills: 1 | COMMUNITY
Start: 2018-07-18 | End: 2019-01-28 | Stop reason: ALTCHOICE

## 2018-10-05 RX ORDER — PRASUGREL 10 MG/1
10 TABLET, FILM COATED ORAL EVERY OTHER DAY
Qty: 15 TABLET | Refills: 11 | Status: SHIPPED | OUTPATIENT
Start: 2018-10-05 | End: 2018-11-14 | Stop reason: SDUPTHER

## 2018-10-05 NOTE — LETTER
October 5, 2018     Amarilys Brennan MD  [address], [address] [place] [address] Close    Patient: Gm Holder   YOB: 1940   Date of Visit: 10/5/2018       Dear Dr Rajeev Calle: Thank you for referring Doris Santos to me for evaluation  Below are my notes for this consultation  If you have questions, please do not hesitate to call me  I look forward to following your patient along with you  Sincerely,        Lindsay Garcia DO        CC: No Recipients  Lindsay Garcia DO  10/5/2018 11:29 AM  Sign at close encounter  Assessment/Plan:      Diagnoses and all orders for this visit:    Uterovaginal prolapse    Other orders  -     clindamycin (CLEOCIN) 300 MG capsule; TAKE 2 CAPSULES BY MOUTH 1 HOUR BEFORE PROCEDURE        We discussed her prolapse in detail  This appears to be a progression over previous  Patient does have some significant medical comorbidities therefore we discussed a retrial of pessary  Patient is enthusiastic  I have asked that she return on Friday October 26th for a pessary fitting and trial of different types to hopefully resolve her issue  Total time for visit was 25 minutes with greater than 50% of that afforded counseling and coordination of care  Subjective:     Patient ID: Gm Holder is a 65 y o  female  Patient presents for consultation regarding prolapsed vaginal tissue  Patient was a former patient of mine last seen over 10 years ago  At that time she was managed with a pessary that had been placed by a different provider to which she felt was too large and uncomfortable  Patient now having progressive increase in vaginal discomfort and recognition of a mass at the introitus  She does have chronic constipation  She does admit to having poor fluid intake in the course of the day  She has no bladder complaints  She does not have any heavy lifting or straining  She denies postmenopausal bleeding          Review of Systems   All other systems reviewed and are negative  Objective:     Physical Exam   Constitutional: She appears well-developed and well-nourished  Abdominal: Soft  There is no tenderness  Genitourinary:   Genitourinary Comments: External genitalia atrophic normal female   Vagina cervix present at the introitus without lesions or discharge  No obvious significant cystocele or rectocele    Uterus small and nontender  Adnexa nontender without obvious mass  Rectal demonstrates a grade 1 rectocele without mass

## 2018-10-05 NOTE — PROGRESS NOTES
Assessment/Plan:      Diagnoses and all orders for this visit:    Uterovaginal prolapse    Other orders  -     clindamycin (CLEOCIN) 300 MG capsule; TAKE 2 CAPSULES BY MOUTH 1 HOUR BEFORE PROCEDURE        We discussed her prolapse in detail  This appears to be a progression over previous  Patient does have some significant medical comorbidities therefore we discussed a retrial of pessary  Patient is enthusiastic  I have asked that she return on Friday October 26th for a pessary fitting and trial of different types to hopefully resolve her issue  Total time for visit was 25 minutes with greater than 50% of that afforded counseling and coordination of care  Subjective:     Patient ID: Radha Garrido is a 65 y o  female  Patient presents for consultation regarding prolapsed vaginal tissue  Patient was a former patient of mine last seen over 10 years ago  At that time she was managed with a pessary that had been placed by a different provider to which she felt was too large and uncomfortable  Patient now having progressive increase in vaginal discomfort and recognition of a mass at the introitus  She does have chronic constipation  She does admit to having poor fluid intake in the course of the day  She has no bladder complaints  She does not have any heavy lifting or straining  She denies postmenopausal bleeding  Review of Systems   All other systems reviewed and are negative  Objective:     Physical Exam   Constitutional: She appears well-developed and well-nourished  Abdominal: Soft  There is no tenderness  Genitourinary:   Genitourinary Comments: External genitalia atrophic normal female   Vagina cervix present at the introitus without lesions or discharge  No obvious significant cystocele or rectocele    Uterus small and nontender  Adnexa nontender without obvious mass  Rectal demonstrates a grade 1 rectocele without mass

## 2018-10-26 ENCOUNTER — OFFICE VISIT (OUTPATIENT)
Dept: OBGYN CLINIC | Facility: MEDICAL CENTER | Age: 78
End: 2018-10-26
Payer: MEDICARE

## 2018-10-26 ENCOUNTER — TELEPHONE (OUTPATIENT)
Dept: OBGYN CLINIC | Facility: MEDICAL CENTER | Age: 78
End: 2018-10-26

## 2018-10-26 VITALS — WEIGHT: 131.8 LBS | DIASTOLIC BLOOD PRESSURE: 78 MMHG | BODY MASS INDEX: 27.55 KG/M2 | SYSTOLIC BLOOD PRESSURE: 128 MMHG

## 2018-10-26 DIAGNOSIS — N81.4 UTEROVAGINAL PROLAPSE: Primary | ICD-10-CM

## 2018-10-26 PROCEDURE — 57160 INSERT PESSARY/OTHER DEVICE: CPT | Performed by: OBSTETRICS & GYNECOLOGY

## 2018-10-26 PROCEDURE — 99213 OFFICE O/P EST LOW 20 MIN: CPT | Performed by: OBSTETRICS & GYNECOLOGY

## 2018-10-26 NOTE — PROGRESS NOTES
Pessary  Date/Time: 10/26/2018 10:45 AM  Performed by: Annette Barton  Authorized by: Annette Barton     Consent:     Consent obtained:  Verbal    Consent given by:  Patient    Procedural risks discussed:  Bleeding, infection and possible continued pain    Patient questions answered: yes      Patient agrees, verbalizes understanding, and wants to proceed: yes    Indication:     Indication for pessary: uterine prolapse    Pre-procedure:     Pessary procedure type:  Fitting (Fitting was completed with determination that a 3  Ring with support would be most appropriate  Patient was able to ambulate and also void without difficulty  There was no expulsion of the pessary  Follow-up pelvic examination revealed excellent placeme)  Problems:     Pessary complications: bleeding    Procedure:     Pessary type:  Ring w/ support    Pessary size:  3    Patient tolerance of procedure: Tolerated well, no immediate complications  Comments:     Procedure comments:  Patient informed that we would order the 3  Ring with support pessary  Patient will return to the office for placement when we have procured the device

## 2018-11-14 DIAGNOSIS — Z95.820 S/P PERCUTANEOUS TRANSLUMINAL ANGIOPLASTY (PTA) WITH STENT PLACEMENT: ICD-10-CM

## 2018-11-14 RX ORDER — PRASUGREL HCL 10 MG
TABLET ORAL
Qty: 30 TABLET | Refills: 5 | Status: SHIPPED | OUTPATIENT
Start: 2018-11-14 | End: 2019-08-06 | Stop reason: HOSPADM

## 2018-11-29 NOTE — PROGRESS NOTES
Cardiology Follow Up    Tyrell Mon  1940  [id number]  [address] [facility]  [address] [place] [address] [address] [place] [address]  [phone]    1  S/P percutaneous transluminal angioplasty (PTA) with stent placement     2  Coronary arteriosclerosis     3  Pure hypercholesterolemia     4  S/P CABG (coronary artery bypass graft)     5  Essential (primary) hypertension         Interval History:  Patient is here for a follow-up visit  She was last seen by me in May of this year  She has a prior history of CABG x4 in July 2011 with closure of the LIMA to the LAD documented thereafter and revascularization of the LAD and the diagonal branch with PCI  Her most recent catheterization was done in December 2012 which demonstrated recurrent disease which is being managed medically given her anatomy  The patient's most recent pharmacologic nuclear stress test done September 2015 demonstrated a small apical infarct without evidence of prognostically important ischemia  Her most recent echocardiogram done April of last year demonstrated preserved LV systolic function with no significant valvular heart disease  Sometimes she forgets to take her medication and does get some angina with anxiety so I asked her to come up with a system to remain compliant  She does intermittently have Raynaud's phenomenon as well  In general however she has been well compensated  She has done quite well with Ranexa      Patient Active Problem List   Diagnosis    CAD (coronary artery disease)    HTN (hypertension)    GERD (gastroesophageal reflux disease)    Colitis    Hyperlipidemia    Uterovaginal prolapse     Past Medical History:   Diagnosis Date    Anal fissure     Cardiac disorder     Esophageal reflux     Esophagitis, reflux     Hemorrhoids     Hepatic hemangioma     Last Assessed: 1/13/2015    Herpes zoster     History of colonic polyps     Hypertension     [facility]     Lumbar herniated disc     Malignant neoplasm without specification of site ([facility] )     Nephrolithiasis     L   Lithotripsy    Nontoxic single thyroid nodule     Last Assessed: 1/13/2015    Osteoarthritis     Overactive bladder     Raynaud disease     Respiratory system disease     Sjogren's disease ([facility]     Spinal stenosis     PONCHO (stress urinary incontinence, female)     Uterovaginal prolapse     Grade I-II     Social History     Social History    Marital status: /Civil Union     Spouse name: N/A    Number of children: N/A    Years of education: N/A     Occupational History    retired      Social History Main Topics    Smoking status: Never Smoker    Smokeless tobacco: Never Used    Alcohol use Yes      Comment: social    Drug use: No    Sexual activity: Not Currently     Other Topics Concern    Not on file     Social History Narrative    Activities: golf    Caffeine use    Consumes healthy diet    Daily caffeinated coffee consumption: 1 cup per day    Drinks caffeinated tea: 1 cup per day    Well balanced diet          Family History   Problem Relation Age of Onset    Heart disease Mother     Hypertension Mother     Osteoporosis Mother     Heart disease Father     Hypertension Father     Ulcerative colitis Family     Colon polyps Family      Past Surgical History:   Procedure Laterality Date    APPENDECTOMY  1947    CARDIAC SURGERY      CABG    CATARACT EXTRACTION Bilateral     COLONOSCOPY  2012    Fiberoptic    COLONOSCOPY      Resolved: 2006 - 2012 5 year f/u    CORONARY ANGIOPLASTY WITH STENT PLACEMENT      CORONARY ARTERY BYPASS GRAFT      Resolved: 2012    ESOPHAGOGASTRODUODENOSCOPY  2012    Diagnostic    HEMORROIDECTOMY      KNEE SURGERY      LITHOTRIPSY      Renal    MALIGNANT SKIN LESION EXCISION      Face; Resolved: 2004    MO ESOPHAGOGASTRODUODENOSCOPY TRANSORAL DIAGNOSTIC N/A 4/13/2016    Procedure: EGD AND COLONOSCOPY;  Surgeon: Audrey Howe MD;  [facility]; Service: Gastroenterology    RENAL ARTERY STENT      SKIN LESION EXCISION      Scalp    SOFT TISSUE TUMOR RESECTION      Shoulder; Resolved: 1995    THROMBOLYSIS      Postoperative Thrombolysis PTCA    TONSILLECTOMY      Resolved: 1944       Current Outpatient Prescriptions:     amLODIPine (NORVASC) 5 mg tablet, Take 5 mg by mouth daily  , Disp: , Rfl:     aspirin (ECOTRIN LOW STRENGTH) 81 mg EC tablet, Take 81 mg by mouth daily  , Disp: , Rfl:     Cholecalciferol (VITAMIN D3 PO), Take 2,000 Units by mouth daily  , Disp: , Rfl:     clindamycin (CLEOCIN) 300 MG capsule, TAKE 2 CAPSULES BY MOUTH 1 HOUR BEFORE PROCEDURE, Disp: , Rfl: 1    Docusate Sodium (COLACE PO), Take by mouth daily  , Disp: , Rfl:     EFFIENT tablet, TAKE 1 TABLET BY MOUTH EVERY OTHER DAY, Disp: 30 tablet, Rfl: 5    fluticasone (FLONASE) 50 mcg/act nasal spray, 1 spray into each nostril 2 (two) times a day, Disp: , Rfl:     isosorbide mononitrate (IMDUR) 60 mg 24 hr tablet, TAKE 1 TABLET BY MOUTH EVERY DAY, Disp: 90 tablet, Rfl: 2    losartan (COZAAR) 50 mg tablet, Take 1 tablet (50 mg total) by mouth daily, Disp: 30 tablet, Rfl: 11    metoprolol succinate (TOPROL-XL) 25 mg 24 hr tablet, Take 1 tablet (25 mg total) by mouth daily, Disp: 30 tablet, Rfl: 11    multivitamin (THERAGRAN) TABS, Take 1 tablet by mouth daily  , Disp: , Rfl:     nitroglycerin (NITROSTAT) 0 4 mg SL tablet, Place 0 4 mg under the tongue every 5 (five) minutes as needed for chest pain , Disp: , Rfl:     RANEXA 500 MG 12 hr tablet, TAKE 1 TABLET EVERY 12 HOURS , Disp: 60 tablet, Rfl: 5    torsemide (DEMADEX) 20 mg tablet, Take 20 mg by mouth daily, Disp: , Rfl:   Allergies   Allergen Reactions    Sulfa Antibiotics Anaphylaxis    Formaldehyde     Codeine Palpitations       Labs:not applicable  Imaging: No results found      Review of Systems:  Review of Systems   All other systems reviewed and are negative  Physical Exam:  /70 (BP Location: Right arm, Patient Position: Sitting, Cuff Size: Standard)   Pulse 66   Ht 4' 10 5" (1 486 m)   Wt 59 kg (130 lb)   BMI 26 71 kg/m²   Physical Exam   Constitutional: She is oriented to person, place, and time  She appears well-developed and well-nourished  HENT:   Head: Normocephalic and atraumatic  Eyes: Pupils are equal, round, and reactive to light  Conjunctivae and EOM are normal    Neck: Normal range of motion  Neck supple  Cardiovascular: Normal rate and normal heart sounds  Pulmonary/Chest: Effort normal and breath sounds normal    Neurological: She is alert and oriented to person, place, and time  Skin: Skin is warm and dry  Psychiatric: She has a normal mood and affect  Vitals reviewed  Discussion/Summary:I will continue the patient's current medical regimen  The patient appears well compensated  I have asked the patient to call if there is a problem in the interim otherwise I will see the patient in six months time  The patient is due for an echo prior to the next visit to assess LV wall thickness and systolic function

## 2018-12-04 ENCOUNTER — OFFICE VISIT (OUTPATIENT)
Dept: CARDIOLOGY CLINIC | Facility: CLINIC | Age: 78
End: 2018-12-04
Payer: MEDICARE

## 2018-12-04 VITALS
DIASTOLIC BLOOD PRESSURE: 70 MMHG | HEART RATE: 66 BPM | SYSTOLIC BLOOD PRESSURE: 120 MMHG | WEIGHT: 130 LBS | BODY MASS INDEX: 26.21 KG/M2 | HEIGHT: 59 IN

## 2018-12-04 DIAGNOSIS — I25.10 CORONARY ARTERIOSCLEROSIS: ICD-10-CM

## 2018-12-04 DIAGNOSIS — Z95.1 S/P CABG (CORONARY ARTERY BYPASS GRAFT): ICD-10-CM

## 2018-12-04 DIAGNOSIS — I10 ESSENTIAL (PRIMARY) HYPERTENSION: ICD-10-CM

## 2018-12-04 DIAGNOSIS — E78.00 PURE HYPERCHOLESTEROLEMIA: ICD-10-CM

## 2018-12-04 DIAGNOSIS — Z95.820 S/P PERCUTANEOUS TRANSLUMINAL ANGIOPLASTY (PTA) WITH STENT PLACEMENT: Primary | ICD-10-CM

## 2018-12-04 PROCEDURE — 99214 OFFICE O/P EST MOD 30 MIN: CPT | Performed by: INTERNAL MEDICINE

## 2018-12-14 ENCOUNTER — OFFICE VISIT (OUTPATIENT)
Dept: OBGYN CLINIC | Facility: MEDICAL CENTER | Age: 78
End: 2018-12-14
Payer: MEDICARE

## 2018-12-14 VITALS — WEIGHT: 131.4 LBS | BODY MASS INDEX: 27 KG/M2 | DIASTOLIC BLOOD PRESSURE: 70 MMHG | SYSTOLIC BLOOD PRESSURE: 120 MMHG

## 2018-12-14 DIAGNOSIS — N81.4 UTEROVAGINAL PROLAPSE: Primary | ICD-10-CM

## 2018-12-14 PROCEDURE — 57160 INSERT PESSARY/OTHER DEVICE: CPT | Performed by: OBSTETRICS & GYNECOLOGY

## 2018-12-14 PROCEDURE — A4562 PESSARY, NON RUBBER,ANY TYPE: HCPCS | Performed by: OBSTETRICS & GYNECOLOGY

## 2018-12-14 PROCEDURE — 99212 OFFICE O/P EST SF 10 MIN: CPT | Performed by: OBSTETRICS & GYNECOLOGY

## 2018-12-14 NOTE — PROGRESS NOTES
Pessary  Date/Time: 12/14/2018 4:27 PM  Performed by: Rakesh Quintana  Authorized by: Rakesh Quintana     Consent:     Consent obtained:  Verbal    Consent given by:  Patient    Procedural risks discussed:  Bleeding and failure rate    Patient questions answered: yes      Patient agrees, verbalizes understanding, and wants to proceed: yes    Indication:     Indication for pessary: uterine prolapse    Pre-procedure:     Pessary procedure type: Insertion  Problems:     Pessary complications: none    Procedure:     Pessary type:  Ring w/ support    Pessary size:  3    Patient tolerance of procedure: Tolerated well, no immediate complications  Comments:     Procedure comments:  Pessary was placed without difficulty  I did ask the patient to ambulate here in the office she did not have recognition of presence of the pessary and it did not expel  Patient will call with any vaginal bleeding or difficulty with bowel or bladder habits  She is scheduled to return to the office in 3 weeks for pessary check

## 2019-01-04 ENCOUNTER — IMMUNIZATION (OUTPATIENT)
Dept: FAMILY MEDICINE CLINIC | Facility: MEDICAL CENTER | Age: 79
End: 2019-01-04
Payer: MEDICARE

## 2019-01-04 ENCOUNTER — OFFICE VISIT (OUTPATIENT)
Dept: OBGYN CLINIC | Facility: MEDICAL CENTER | Age: 79
End: 2019-01-04
Payer: MEDICARE

## 2019-01-04 DIAGNOSIS — N81.4 UTEROVAGINAL PROLAPSE: Primary | ICD-10-CM

## 2019-01-04 DIAGNOSIS — Z23 NEED FOR IMMUNIZATION AGAINST INFLUENZA: Primary | ICD-10-CM

## 2019-01-04 PROCEDURE — G0008 ADMIN INFLUENZA VIRUS VAC: HCPCS

## 2019-01-04 PROCEDURE — 90662 IIV NO PRSV INCREASED AG IM: CPT

## 2019-01-04 PROCEDURE — 99213 OFFICE O/P EST LOW 20 MIN: CPT | Performed by: OBSTETRICS & GYNECOLOGY

## 2019-01-04 NOTE — PROGRESS NOTES
Assessment/Plan:      Diagnoses and all orders for this visit:    Uterovaginal prolapse        Ring pessary with support was removed cleansed and replaced  We discussed strategies to help improve some of her symptomatology  I have advised that she increased fluid intake as well as activity  I have also suggested MiraLax 1 tsp every morning  Patient is willing to continue a trial of the pessary  She return to the office in 2 months for re-evaluation  Subjective:     Patient ID: Jonathan Haro is a 77 y o  female  Patient returns status post ring pessary with support 3 placement 3 weeks ago  Unfortunately patient has noted increase in stress urinary incontinence since placement  She also has noted some difficulties with constipation  She does note good resolution of her pelvic organ prolapse  She has not noted bleeding  She has no fever or chills  She has no pain        Review of Systems   All other systems reviewed and are negative  Objective:     Physical Exam   Constitutional: She appears well-developed and well-nourished  Genitourinary:   Genitourinary Comments: Ring pessary with support well placed  There are no vaginal ulcerations or lesions  There is no abnormal vaginal or cervical discharge  Psychiatric: She has a normal mood and affect   Her behavior is normal  Judgment and thought content normal

## 2019-01-28 ENCOUNTER — OFFICE VISIT (OUTPATIENT)
Dept: FAMILY MEDICINE CLINIC | Facility: MEDICAL CENTER | Age: 79
End: 2019-01-28
Payer: MEDICARE

## 2019-01-28 ENCOUNTER — TELEPHONE (OUTPATIENT)
Dept: FAMILY MEDICINE CLINIC | Facility: MEDICAL CENTER | Age: 79
End: 2019-01-28

## 2019-01-28 VITALS
WEIGHT: 133 LBS | DIASTOLIC BLOOD PRESSURE: 84 MMHG | HEART RATE: 56 BPM | BODY MASS INDEX: 27.32 KG/M2 | SYSTOLIC BLOOD PRESSURE: 130 MMHG

## 2019-01-28 DIAGNOSIS — Z13.820 SCREENING FOR OSTEOPOROSIS: ICD-10-CM

## 2019-01-28 DIAGNOSIS — I10 ESSENTIAL HYPERTENSION: ICD-10-CM

## 2019-01-28 DIAGNOSIS — I25.10 CORONARY ARTERY DISEASE INVOLVING NATIVE HEART WITHOUT ANGINA PECTORIS, UNSPECIFIED VESSEL OR LESION TYPE: Primary | ICD-10-CM

## 2019-01-28 DIAGNOSIS — E66.3 OVERWEIGHT: ICD-10-CM

## 2019-01-28 DIAGNOSIS — N81.4 UTERINE PROLAPSE: ICD-10-CM

## 2019-01-28 DIAGNOSIS — Z12.39 SCREENING FOR BREAST CANCER: Primary | ICD-10-CM

## 2019-01-28 DIAGNOSIS — I25.10 CORONARY ARTERY DISEASE INVOLVING NATIVE HEART WITHOUT ANGINA PECTORIS, UNSPECIFIED VESSEL OR LESION TYPE: Chronic | ICD-10-CM

## 2019-01-28 PROCEDURE — 99214 OFFICE O/P EST MOD 30 MIN: CPT | Performed by: FAMILY MEDICINE

## 2019-01-28 RX ORDER — ATORVASTATIN CALCIUM 40 MG/1
40 TABLET, FILM COATED ORAL DAILY
Qty: 90 TABLET | Refills: 3 | Status: SHIPPED | OUTPATIENT
Start: 2019-01-28 | End: 2020-06-16 | Stop reason: CLARIF

## 2019-01-28 NOTE — PROGRESS NOTES
Karen Pat is here for a checkup  She complains of constipation  Got worse after getting a pessary  She has  a high fiber diet  She  is also trying a stool softener  She is consider having hysterectomy in the past but has declined but declined she would like to now reconsider  Mother  following complications from hysterectomy at age 79  I referred her to gyn last year for her pelvic organ prolapse  She was started on a pessary at that time  She complains of pain in her mid right back  Pain occurs with done her housework  Exertional  Not at rest    She continues to stay active  She saw Dr Hussein Cabrales in December and was advised to have an echo done  He also follows her carotid ultrasounds for her carotid stenosis  Review of the chart indicates she has not been taking her atorvastatin  Apparently stopped it on her own  O: /84 (BP Location: Left arm, Patient Position: Sitting, Cuff Size: Adult)   Pulse 56   Wt 60 3 kg (133 lb)   BMI 27 32 kg/m²   Physical Exam   Constitutional: She appears well-developed and well-nourished  No distress  Neck: Carotid bruit is not present  No thyromegaly present  Cardiovascular: Normal rate, regular rhythm, normal heart sounds and intact distal pulses  Pulmonary/Chest: Effort normal and breath sounds normal    Abdominal: Soft  Bowel sounds are normal  She exhibits no mass  There is no hepatomegaly  There is no tenderness  Musculoskeletal: She exhibits no edema  Lymphadenopathy:     She has no cervical adenopathy  Assessment  1  Hypertension-controlled with losartan and metoprolol  2   Coronary artery disease-per Cardiology  I  advised her to continue to take a statin for prevention  3   Pelvic organ prolapse- sees gyn for pessary placement placement  4   History of carotid stenosis-followed by Cardiology  5  Health maintenance-immunizations updated  DeclinesShingrix  Plan  As above  Due for mammogram DEXA scan and blood work  Recheck 6 months

## 2019-01-28 NOTE — TELEPHONE ENCOUNTER
I called Maurice Freeman to see why she is no longer taking the Atorvastatin  She said she does not have elevated cholesterol so she just stopped the medication on her own  Her last LP did show some elevations in total and LDL which I made her aware of  I explained because of her heart disease she should still be taking this med if she did not have a problem with the med  She does not recall having one  She can restart that med if you send over a new RX to the [facility]  She was fine with restarting the med

## 2019-01-29 DIAGNOSIS — I25.118 CORONARY ARTERY DISEASE OF NATIVE HEART WITH STABLE ANGINA PECTORIS, UNSPECIFIED VESSEL OR LESION TYPE (HCC): Primary | ICD-10-CM

## 2019-01-29 NOTE — TELEPHONE ENCOUNTER
You advised her correctly and she needs to be taking the statin due to her heart disease regardless of cholesterol  I sent Rx    She should wait 6 weeks to do her blood work

## 2019-02-05 ENCOUNTER — APPOINTMENT (OUTPATIENT)
Dept: LAB | Facility: MEDICAL CENTER | Age: 79
End: 2019-02-05
Payer: MEDICARE

## 2019-02-05 DIAGNOSIS — E66.3 OVERWEIGHT: ICD-10-CM

## 2019-02-05 DIAGNOSIS — Z13.820 SCREENING FOR OSTEOPOROSIS: ICD-10-CM

## 2019-02-05 DIAGNOSIS — Z12.39 SCREENING FOR BREAST CANCER: ICD-10-CM

## 2019-02-05 DIAGNOSIS — I10 ESSENTIAL HYPERTENSION: ICD-10-CM

## 2019-02-05 LAB
25(OH)D3 SERPL-MCNC: 51.5 NG/ML (ref 30–100)
ALBUMIN SERPL BCP-MCNC: 3.7 G/DL (ref 3.5–5)
ALP SERPL-CCNC: 72 U/L (ref 46–116)
ALT SERPL W P-5'-P-CCNC: 24 U/L (ref 12–78)
ANION GAP SERPL CALCULATED.3IONS-SCNC: 6 MMOL/L (ref 4–13)
AST SERPL W P-5'-P-CCNC: 18 U/L (ref 5–45)
BILIRUB SERPL-MCNC: 0.95 MG/DL (ref 0.2–1)
BUN SERPL-MCNC: 19 MG/DL (ref 5–25)
CALCIUM SERPL-MCNC: 8.9 MG/DL (ref 8.3–10.1)
CHLORIDE SERPL-SCNC: 108 MMOL/L (ref 100–108)
CO2 SERPL-SCNC: 27 MMOL/L (ref 21–32)
CREAT SERPL-MCNC: 0.8 MG/DL (ref 0.6–1.3)
ERYTHROCYTE [DISTWIDTH] IN BLOOD BY AUTOMATED COUNT: 13.1 % (ref 11.6–15.1)
EST. AVERAGE GLUCOSE BLD GHB EST-MCNC: 111 MG/DL
GFR SERPL CREATININE-BSD FRML MDRD: 70 ML/MIN/1.73SQ M
GLUCOSE P FAST SERPL-MCNC: 86 MG/DL (ref 65–99)
HBA1C MFR BLD: 5.5 % (ref 4.2–6.3)
HCT VFR BLD AUTO: 41.2 % (ref 34.8–46.1)
HGB BLD-MCNC: 12.7 G/DL (ref 11.5–15.4)
MCH RBC QN AUTO: 30 PG (ref 26.8–34.3)
MCHC RBC AUTO-ENTMCNC: 30.8 G/DL (ref 31.4–37.4)
MCV RBC AUTO: 97 FL (ref 82–98)
PLATELET # BLD AUTO: 191 THOUSANDS/UL (ref 149–390)
PMV BLD AUTO: 10.9 FL (ref 8.9–12.7)
POTASSIUM SERPL-SCNC: 4.5 MMOL/L (ref 3.5–5.3)
PROT SERPL-MCNC: 6.8 G/DL (ref 6.4–8.2)
RBC # BLD AUTO: 4.23 MILLION/UL (ref 3.81–5.12)
SODIUM SERPL-SCNC: 141 MMOL/L (ref 136–145)
TSH SERPL DL<=0.05 MIU/L-ACNC: 0.63 UIU/ML (ref 0.36–3.74)
WBC # BLD AUTO: 4.65 THOUSAND/UL (ref 4.31–10.16)

## 2019-02-05 PROCEDURE — 83036 HEMOGLOBIN GLYCOSYLATED A1C: CPT

## 2019-02-05 PROCEDURE — 80053 COMPREHEN METABOLIC PANEL: CPT

## 2019-02-05 PROCEDURE — 85027 COMPLETE CBC AUTOMATED: CPT

## 2019-02-05 PROCEDURE — 82306 VITAMIN D 25 HYDROXY: CPT

## 2019-02-05 PROCEDURE — 36415 COLL VENOUS BLD VENIPUNCTURE: CPT

## 2019-02-05 PROCEDURE — 84443 ASSAY THYROID STIM HORMONE: CPT

## 2019-02-07 ENCOUNTER — TELEPHONE (OUTPATIENT)
Dept: FAMILY MEDICINE CLINIC | Facility: MEDICAL CENTER | Age: 79
End: 2019-02-07

## 2019-02-07 NOTE — TELEPHONE ENCOUNTER
----- Message from Primitivo Russo MD sent at 2/7/2019  8:35 AM EST -----  Notify blood work is all good     Will await lipid profile which she should do in March after she has been on her statin for 6 weeks

## 2019-03-22 ENCOUNTER — OFFICE VISIT (OUTPATIENT)
Dept: OBGYN CLINIC | Facility: MEDICAL CENTER | Age: 79
End: 2019-03-22
Payer: MEDICARE

## 2019-03-22 DIAGNOSIS — N81.4 UTEROVAGINAL PROLAPSE: Primary | ICD-10-CM

## 2019-03-22 PROCEDURE — 99212 OFFICE O/P EST SF 10 MIN: CPT | Performed by: OBSTETRICS & GYNECOLOGY

## 2019-03-22 PROCEDURE — 57160 INSERT PESSARY/OTHER DEVICE: CPT | Performed by: OBSTETRICS & GYNECOLOGY

## 2019-03-22 NOTE — PROGRESS NOTES
Pessary  Date/Time: 3/22/2019 12:12 PM  Performed by: Belen León DO  Authorized by: Belen León DO     Consent:     Consent obtained:  Verbal    Consent given by:  Patient  Indication:     Indication for pessary: uterine prolapse    Pre-procedure:     Pessary procedure type:  Cleaning/check  Problems:     Pessary complications: unable to move bowels    Procedure:     Pessary type:  Ring w/ support    Pessary size:  3    Patient tolerance of procedure: Tolerated well, no immediate complications  Comments:     Procedure comments:  Pessary was removed cleansed and replaced with Premarin vaginal cream   Patient has had some challenges with firm bowel movements and occasional constipation  This is also exacerbated her stress urinary incontinence  She takes only Colace  She also admits that she has coffee in the morning and 1 serving of ice tea later in the day  I have reiterated my desire for her to use 1 tsp of MiraLax daily and increase her fluid intake as I believe this will benefit both bowel and bladder function  Patient will return to the office in 4 months for pessary check

## 2019-05-09 ENCOUNTER — HOSPITAL ENCOUNTER (OUTPATIENT)
Dept: NON INVASIVE DIAGNOSTICS | Facility: MEDICAL CENTER | Age: 79
Discharge: HOME | End: 2019-05-09
Payer: MEDICARE

## 2019-05-09 DIAGNOSIS — Z95.820 S/P PERCUTANEOUS TRANSLUMINAL ANGIOPLASTY (PTA) WITH STENT PLACEMENT: ICD-10-CM

## 2019-05-09 DIAGNOSIS — Z95.1 S/P CABG (CORONARY ARTERY BYPASS GRAFT): ICD-10-CM

## 2019-05-09 DIAGNOSIS — I10 ESSENTIAL (PRIMARY) HYPERTENSION: ICD-10-CM

## 2019-05-09 DIAGNOSIS — E78.00 PURE HYPERCHOLESTEROLEMIA: ICD-10-CM

## 2019-05-09 DIAGNOSIS — I25.10 CORONARY ARTERIOSCLEROSIS: ICD-10-CM

## 2019-05-09 PROCEDURE — 93306 TTE W/DOPPLER COMPLETE: CPT | Performed by: INTERNAL MEDICINE

## 2019-05-09 PROCEDURE — 93306 TTE W/DOPPLER COMPLETE: CPT

## 2019-06-03 ENCOUNTER — OFFICE VISIT (OUTPATIENT)
Dept: CARDIOLOGY CLINIC | Facility: CLINIC | Age: 79
End: 2019-06-03
Payer: MEDICARE

## 2019-06-03 VITALS
HEART RATE: 69 BPM | SYSTOLIC BLOOD PRESSURE: 118 MMHG | HEIGHT: 58 IN | OXYGEN SATURATION: 98 % | WEIGHT: 131.2 LBS | DIASTOLIC BLOOD PRESSURE: 72 MMHG | BODY MASS INDEX: 27.54 KG/M2

## 2019-06-03 DIAGNOSIS — I10 ESSENTIAL (PRIMARY) HYPERTENSION: Primary | ICD-10-CM

## 2019-06-03 DIAGNOSIS — Z95.820 S/P PERCUTANEOUS TRANSLUMINAL ANGIOPLASTY (PTA) WITH STENT PLACEMENT: ICD-10-CM

## 2019-06-03 DIAGNOSIS — I25.10 CORONARY ARTERY DISEASE INVOLVING NATIVE HEART WITHOUT ANGINA PECTORIS, UNSPECIFIED VESSEL OR LESION TYPE: ICD-10-CM

## 2019-06-03 DIAGNOSIS — E78.00 PURE HYPERCHOLESTEROLEMIA: ICD-10-CM

## 2019-06-03 PROCEDURE — 99214 OFFICE O/P EST MOD 30 MIN: CPT | Performed by: INTERNAL MEDICINE

## 2019-06-03 PROCEDURE — 93000 ELECTROCARDIOGRAM COMPLETE: CPT | Performed by: INTERNAL MEDICINE

## 2019-06-06 DIAGNOSIS — I10 HYPERTENSION, UNSPECIFIED TYPE: ICD-10-CM

## 2019-06-06 DIAGNOSIS — I25.10 CORONARY ARTERY DISEASE INVOLVING NATIVE HEART WITHOUT ANGINA PECTORIS, UNSPECIFIED VESSEL OR LESION TYPE: ICD-10-CM

## 2019-06-06 RX ORDER — METOPROLOL SUCCINATE 25 MG/1
TABLET, EXTENDED RELEASE ORAL
Qty: 30 TABLET | Refills: 9 | Status: SHIPPED | OUTPATIENT
Start: 2019-06-06 | End: 2020-07-06

## 2019-06-06 RX ORDER — LOSARTAN POTASSIUM 50 MG/1
TABLET ORAL
Qty: 30 TABLET | Refills: 6 | Status: ON HOLD | OUTPATIENT
Start: 2019-06-06 | End: 2019-08-06 | Stop reason: SDUPTHER

## 2019-07-02 ENCOUNTER — OFFICE VISIT (OUTPATIENT)
Dept: FAMILY MEDICINE CLINIC | Facility: MEDICAL CENTER | Age: 79
End: 2019-07-02
Payer: MEDICARE

## 2019-07-02 VITALS
RESPIRATION RATE: 16 BRPM | BODY MASS INDEX: 27.59 KG/M2 | WEIGHT: 132 LBS | SYSTOLIC BLOOD PRESSURE: 128 MMHG | DIASTOLIC BLOOD PRESSURE: 62 MMHG | HEART RATE: 60 BPM

## 2019-07-02 DIAGNOSIS — Z00.00 MEDICARE ANNUAL WELLNESS VISIT, SUBSEQUENT: ICD-10-CM

## 2019-07-02 DIAGNOSIS — I10 ESSENTIAL HYPERTENSION: ICD-10-CM

## 2019-07-02 DIAGNOSIS — K21.9 GASTROESOPHAGEAL REFLUX DISEASE WITHOUT ESOPHAGITIS: ICD-10-CM

## 2019-07-02 DIAGNOSIS — H90.3 SENSORINEURAL HEARING LOSS (SNHL) OF BOTH EARS: ICD-10-CM

## 2019-07-02 DIAGNOSIS — G89.29 CHRONIC RIGHT-SIDED THORACIC BACK PAIN: ICD-10-CM

## 2019-07-02 DIAGNOSIS — M54.6 CHRONIC RIGHT-SIDED THORACIC BACK PAIN: ICD-10-CM

## 2019-07-02 DIAGNOSIS — E78.5 HYPERLIPIDEMIA, UNSPECIFIED HYPERLIPIDEMIA TYPE: ICD-10-CM

## 2019-07-02 DIAGNOSIS — I25.10 CORONARY ARTERY DISEASE INVOLVING NATIVE HEART WITHOUT ANGINA PECTORIS, UNSPECIFIED VESSEL OR LESION TYPE: ICD-10-CM

## 2019-07-02 DIAGNOSIS — Z12.11 SCREEN FOR COLON CANCER: Primary | ICD-10-CM

## 2019-07-02 PROBLEM — K55.9 ISCHEMIC COLITIS (HCC): Status: ACTIVE | Noted: 2019-07-02

## 2019-07-02 PROCEDURE — G0439 PPPS, SUBSEQ VISIT: HCPCS | Performed by: FAMILY MEDICINE

## 2019-07-02 PROCEDURE — 99214 OFFICE O/P EST MOD 30 MIN: CPT | Performed by: FAMILY MEDICINE

## 2019-07-02 NOTE — PROGRESS NOTES
Assessment and Plan:     Problem List Items Addressed This Visit     None         History of Present Illness:     Patient presents for Medicare Annual Wellness visit    Patient Care Team:  Tenzin Garg MD as PCP - General  MD Yuliet Guerrero MD Gini Meline, MD Erna Perks, MD Annella Isles, MD Maurilio Berg, MD Milus Saber, MD Milus Saber, MD as Endoscopist     Problem List:     Patient Active Problem List   Diagnosis    CAD (coronary artery disease)    HTN (hypertension)    GERD (gastroesophageal reflux disease)    Colitis    Hyperlipidemia    Uterovaginal prolapse      Past Medical and Surgical History:     Past Medical History:   Diagnosis Date    Anal fissure     Cardiac disorder     Esophageal reflux     Esophagitis, reflux     Hemorrhoids     Hepatic hemangioma     Last Assessed: 1/13/2015    Herpes zoster     History of colonic polyps     Hypertension     [facility] [address] [facility]     Lumbar herniated disc     Malignant neoplasm without specification of site ([facility] [address] [facility]     Nephrolithiasis     L   Lithotripsy    Nontoxic single thyroid nodule     Last Assessed: 1/13/2015    Osteoarthritis     Overactive bladder     Raynaud disease     Respiratory system disease     Sjogren's disease ([facility] [address] [facility]     Spinal stenosis     PONCHO (stress urinary incontinence, female)     Uterovaginal prolapse     Grade I-II     Past Surgical History:   Procedure Laterality Date    APPENDECTOMY  1947    CARDIAC SURGERY      CABG    CATARACT EXTRACTION Bilateral     COLONOSCOPY  2012    Fiberoptic    COLONOSCOPY      Resolved: 2006 - 2012 5 year f/u    CORONARY ANGIOPLASTY WITH STENT PLACEMENT      CORONARY ARTERY BYPASS GRAFT      Resolved: 2012    ESOPHAGOGASTRODUODENOSCOPY  2012    Diagnostic    HEMORROIDECTOMY      KNEE SURGERY      LITHOTRIPSY      Renal    MALIGNANT SKIN LESION EXCISION      Face; Resolved: 2004    GA ESOPHAGOGASTRODUODENOSCOPY TRANSORAL DIAGNOSTIC N/A 4/13/2016    Procedure: EGD AND COLONOSCOPY;  Surgeon: Yasmani Timmons MD;  [facility]; Service: Gastroenterology    RENAL ARTERY STENT      SKIN LESION EXCISION      Scalp    SOFT TISSUE TUMOR RESECTION      Shoulder; Resolved: 1995    THROMBOLYSIS      Postoperative Thrombolysis PTCA    TONSILLECTOMY      Resolved: 1944      Family History:     Family History   Problem Relation Age of Onset    Heart disease Mother     Hypertension Mother     Osteoporosis Mother     Heart disease Father     Hypertension Father     Ulcerative colitis Family     Colon polyps Family       Social History:     Social History     Tobacco Use   Smoking Status Never Smoker   Smokeless Tobacco Never Used     Social History     Substance and Sexual Activity   Alcohol Use Yes    Comment: social     Social History     Substance and Sexual Activity   Drug Use No      Medications and Allergies:     Current Outpatient Medications   Medication Sig Dispense Refill    aspirin (ECOTRIN LOW STRENGTH) 81 mg EC tablet Take 81 mg by mouth daily   Cholecalciferol (VITAMIN D3 PO) Take 2,000 Units by mouth daily        Docusate Sodium (COLACE PO) Take by mouth daily        EFFIENT tablet TAKE 1 TABLET BY MOUTH EVERY OTHER DAY 30 tablet 5    isosorbide mononitrate (IMDUR) 60 mg 24 hr tablet TAKE 1 TABLET BY MOUTH EVERY DAY 90 tablet 2    losartan (COZAAR) 50 mg tablet TAKE 1 TABLET BY MOUTH EVERY DAY 30 tablet 6    metoprolol succinate (TOPROL-XL) 25 mg 24 hr tablet TAKE 1 TABLET BY MOUTH EVERY DAY 30 tablet 9    multivitamin (THERAGRAN) TABS Take 1 tablet by mouth daily   nitroglycerin (NITROSTAT) 0 4 mg SL tablet Place 0 4 mg under the tongue every 5 (five) minutes as needed for chest pain   RANEXA 500 MG 12 hr tablet TAKE 1 TABLET EVERY 12 HOURS   60 tablet 5    torsemide (DEMADEX) 20 mg tablet Take 20 mg by mouth daily      atorvastatin (LIPITOR) 40 mg tablet Take 1 tablet (40 mg total) by mouth daily (Patient not taking: Reported on 7/2/2019) 90 tablet 3     No current facility-administered medications for this visit  Allergies   Allergen Reactions    Sulfa Antibiotics Anaphylaxis    Formaldehyde     Codeine Palpitations      Immunizations:     Immunization History   Administered Date(s) Administered    Influenza Split High Dose Preservative Free IM 10/20/2014, 10/26/2016    Influenza, high dose seasonal 0 5 mL 01/04/2019    Pneumococcal Conjugate 13-Valent 06/21/2017    Pneumococcal Polysaccharide PPV23 12/07/2006      Medicare Screening Tests and Risk Assessments:     Shante Sanchez is here for her Subsequent Wellness visit  Health Risk Assessment:  Patient rates overall health as good  Patient feels that their physical health rating is Same  Eyesight was rated as Same  Hearing was rated as Same  Patient feels that their emotional and mental health rating is Same  Pain experienced by patient in the last 7 days has been Some  Patient states that she has experienced no weight loss or gain in last 6 months  Emotional/Mental Health:  Patient has not been feeling nervous/anxious  PHQ-9 Depression Screening:    Frequency of the following problems over the past two weeks:      1  Little interest or pleasure in doing things: 0 - not at all      2  Feeling down, depressed, or hopeless: 0 - not at all  PHQ-2 Score: 0          Broken Bones/Falls: Fall Risk Assessment:    In the past year, patient has experienced: No history of falling in past year          Bladder/Bowel:  Patient has not leaked urine accidently in the last six months  Patient reports no loss of bowel control  Immunizations:  Patient has had a flu vaccination within the last year  Patient has received a pneumonia shot  Patient has not received a shingles shot  Patient has not received tetanus/diphtheria shot  Home Safety:  Patient does not have trouble with stairs inside or outside of their home     Patient currently reports that there are no safety hazards present in home, working smoke alarms, working carbon monoxide detectors  Preventative Screenings:   Breast cancer screening performed, colon cancer screen completed, cholesterol screen completed, glaucoma eye exam completed,     Nutrition:  Current diet: Regular with servings of the following:  (Additional Comments: Eats fruits and vegetables, salads  Avoids sweets  )    Medications:  Patient is currently taking over-the-counter supplements  Patient is able to manage medications  Lifestyle Choices:  Patient reports no tobacco use  Patient has not smoked or used tobacco in the past   Patient reports alcohol use  Alcohol use per week: Socially  Patient drives a vehicle  Patient wears seat belt  Current level of exercise of physical activity described by patient as: Golf 1-2 times a week           Activities of Daily Living:  Can get out of bed by his or her self, able to dress self, able to make own meals, able to do own shopping, able to bathe self, can do own laundry/housekeeping, can manage own money, pay bills and track expenses    Previous Hospitalizations:  No hospitalization or ED visit in past 12 months        Advanced Directives:  Patient has decided on a power of   Patient has spoken to designated power of   Patient has completed advanced directive    Additional Comments:  and daughter    Preventative Screening/Counseling:      Cardiovascular:      General: Screening Current      Counseling: Healthy Diet and Healthy Weight          Diabetes:      General: Screening Current      Counseling: Healthy Diet and Healthy Weight          Colorectal Cancer:      General: Risks and Benefits Discussed and Screening Current      Comments: Colonoscopy 2016 Dr Sharri Chino        Breast Cancer:      General: Risks and Benefits Discussed          Cervical Cancer:      General: Screening Not Indicated          Osteoporosis: Counseling: Calcium and Vitamin D Intake and Regular Weightbearing Exercise      Due for studies: Bone Density Ultrasound          AAA:      Family history of abdominal aortic aneurysm  General: Risks and Benefits Discussed and Screening Current          Glaucoma:      General: Risks and Benefits Discussed      Referrals: Optometry          HIV:      General: Screening Not Indicated          Hepatitis C:      General: Screening Not Indicated        Advanced Directives:   Patient has living will for healthcare, has durable POA for healthcare, patient has an advanced directive  Information on ACP and/or AD provided  No 5 wishes given  End of life assessment reviewed with patient  Provider agrees with end of life decisions   Immunizations:      Influenza: Influenza UTD This Year      Pneumococcal: Lifetime Vaccine Completed      Shingrix: Risks & Benefits Discussed and Patient Declines      Hepatitis B (Low risk patients): Series Not Indicated      Zostavax: Risks & Benefits Discussed and Patient Declines      TD: Risks & Benefits Discussed and Patient Declines      TDAP: Risks & Benefits Discussed and Patient Declines      Other Preventative Counseling (Non-Medicare): Fall Prevention, Increase physical activity and Nutrition Counseling        O: /62 (BP Location: Left arm, Patient Position: Sitting, Cuff Size: Adult)   Pulse 60   Resp 16   Wt 59 9 kg (132 lb)   BMI 27 59 kg/m²   Physical Exam   Constitutional: She is oriented to person, place, and time  She appears well-developed and well-nourished  HENT:   Head: Normocephalic  Right Ear: External ear normal    Left Ear: External ear normal    Nose: Nose normal    Mouth/Throat: Oropharynx is clear and moist    Eyes: Pupils are equal, round, and reactive to light  Conjunctivae and EOM are normal  No scleral icterus  Neck: Normal range of motion  Neck supple  Carotid bruit is not present  No thyroid mass and no thyromegaly present  Cardiovascular: Normal rate, regular rhythm and normal heart sounds  Pulses:       Femoral pulses are 2+ on the right side, and 2+ on the left side  Popliteal pulses are 2+ on the right side, and 2+ on the left side  Dorsalis pedis pulses are 2+ on the right side, and 2+ on the left side  Posterior tibial pulses are 2+ on the right side, and 2+ on the left side  Distal pulses nonpalpable  Bilateral carotid bruits   Pulmonary/Chest: Effort normal and breath sounds normal  No respiratory distress  She has no wheezes  She has no rales  Abdominal: Soft  Normal aorta and bowel sounds are normal  She exhibits no distension and no mass  There is no hepatosplenomegaly  There is no tenderness  Musculoskeletal: Normal range of motion  She exhibits no edema  Lymphadenopathy:        Head (right side): No submandibular and no occipital adenopathy present  Head (left side): No submandibular and no occipital adenopathy present  She has no cervical adenopathy  Right: No inguinal and no supraclavicular adenopathy present  Left: No inguinal and no supraclavicular adenopathy present  Neurological: She is oriented to person, place, and time  Skin: No lesion and no rash noted  Psychiatric: She has a normal mood and affect  Her behavior is normal      Assessment  Annual Medicare Wellness    Plan  Mammogram, Dexa scan

## 2019-07-03 ENCOUNTER — TELEPHONE (OUTPATIENT)
Dept: FAMILY MEDICINE CLINIC | Facility: MEDICAL CENTER | Age: 79
End: 2019-07-03

## 2019-07-03 DIAGNOSIS — I15.0 RENOVASCULAR HYPERTENSION: Chronic | ICD-10-CM

## 2019-07-03 DIAGNOSIS — I65.23 ASYMPTOMATIC BILATERAL CAROTID ARTERY STENOSIS: ICD-10-CM

## 2019-07-03 DIAGNOSIS — I73.9 CLAUDICATION OF BOTH LOWER EXTREMITIES (HCC): Primary | ICD-10-CM

## 2019-07-03 NOTE — PROGRESS NOTES
Dr Jarrell Daniel office called and said patient was to have dopplers and per patient she was not notified

## 2019-07-03 NOTE — PROGRESS NOTES
I placed the orders for dopplers and Wilfredo Landau will schedule  Will route the results to dr Campos [facility] and if office appt needed will schedule later

## 2019-07-03 NOTE — TELEPHONE ENCOUNTER
----- Message from Carlos Adames MA sent at 7/3/2019  8:45 AM EDT -----      ----- Message -----  From: Priya Wan MD  Sent: 7/2/2019  10:21 PM EDT  To: [facility]    Notify Osbaldo Holley that I found a colonoscopy from 2016 on her chart  She does not need to do the Hemoccult  Also need to call vascular surgery  I am unclear about follow-up for Leah     According to the nurse practitioner's note in 2016 she was supposed to get an annual follow-up for renal, carotid, and peripheral arterial disease  Osbaldo Holley said she was not notified for any orders  Not sure who is following this  I do see Cardiology did order carotid ultrasound in 2017    She does have claudication and so I do think she needs to be seen

## 2019-07-03 NOTE — TELEPHONE ENCOUNTER
Chaz Magana said she used to see Dr Jonathan Corbett, she is aware we will contact that office to see what follow up is recommended  Spoke with [facility], she pulled up [facility] and said that the best course of action would be to set the patient up for the 3 studies first and then a follow up appt with Dr Jonathan Corbett to review the results  She said that she will have scheduling contact Chaz Magana to set up the studies     [facility]

## 2019-07-03 NOTE — PROGRESS NOTES
Mandy Rosales is here for follow-up of her medical problems  She is also here for her Medicare wellness  See other note  She complains of right mid back pain for quite some time  Pain is aggravated by exertion  It is not pleuritic  It does not radiate  She has not really tried any medication or heat  No associated shortness of breath or other symptoms  No paresthesias  She Has a history of bilateral renal artery stenosis  She said surgery was suggested but was decided against although I am not clear why  I assume it is because her blood pressure is well controlled  She is also followed by vascular surgery for her peripheral arterial disease as well as her carotid stenosis  However I do not see any follow-up for the peripheral arterial disease and renal artery stenosis since 2016  She has had a carotid ultrasound ordered by her cardiologist in 2017  She does note that she cannot walk for long distances due to pain in her calves  She saw  her cardiologist for follow-up of her coronary artery disease in December  No changes were made  She complains of severe tinnitus  She has seen ENT for this  See consult  She also has hearing loss and hearing aids were recommended  She had a colonoscopy in 2016 per Mukesh Lucas  Apparently felt to have ischemic colitis although she has had no current symptoms  She had lipid profile done a year ago which showed cholesterol 227  She apparently was not taking her atorvastatin and she was encouraged to do so  O: /62 (BP Location: Left arm, Patient Position: Sitting, Cuff Size: Adult)   Pulse 60   Resp 16   Wt 59 9 kg (132 lb)   BMI 27 59 kg/m²   Physical Exam   Constitutional: She is oriented to person, place, and time  She appears well-developed and well-nourished  HENT:   Head: Normocephalic     Right Ear: External ear normal    Left Ear: External ear normal    Nose: Nose normal    Mouth/Throat: Oropharynx is clear and moist    Eyes: Pupils are equal, round, and reactive to light  Conjunctivae and EOM are normal  No scleral icterus  Neck: Normal range of motion  Neck supple  Carotid bruit is not present  No thyroid mass and no thyromegaly present  Cardiovascular: Normal rate, regular rhythm and normal heart sounds  Pulses:       Femoral pulses are 2+ on the right side, and 2+ on the left side  Popliteal pulses are 2+ on the right side, and 2+ on the left side  Dorsalis pedis pulses are 2+ on the right side, and 2+ on the left side  Posterior tibial pulses are 2+ on the right side, and 2+ on the left side  Bilateral carotid bruits  Distal pulses nonpalpable  Pulmonary/Chest: Effort normal and breath sounds normal  No respiratory distress  She has no wheezes  She has no rales  Abdominal: Soft  Normal aorta and bowel sounds are normal  She exhibits no distension and no mass  There is no hepatosplenomegaly  There is no tenderness  Musculoskeletal: Normal range of motion  She exhibits no edema  Lymphadenopathy:        Head (right side): No submandibular and no occipital adenopathy present  Head (left side): No submandibular and no occipital adenopathy present  She has no cervical adenopathy  Right: No inguinal and no supraclavicular adenopathy present  Left: No inguinal and no supraclavicular adenopathy present  Neurological: She is oriented to person, place, and time  Skin: No lesion and no rash noted  Psychiatric: She has a normal mood and affect  Her behavior is normal      Assessment  1  Thoracic back pain- likely degenerative  Suggested trial of Tylenol and heat  Could consider imaging of desires  2  Hearing loss-advised to proceed with hearing aids which may be helpful for her tinnitus  3  Peripheral arterial disease-does have claudication  Supposed to have follow-up vascular surgery  4  Bilateral carotid stenosis-also due for follow-up  5     Renal artery stenosis-I believe also followed vascular surgery  Plan advised follow-up with vascular surgery  Otherwise as above  Other screenings per Medicare wellness note  Recheck 6 months

## 2019-07-26 ENCOUNTER — OFFICE VISIT (OUTPATIENT)
Dept: OBGYN CLINIC | Facility: MEDICAL CENTER | Age: 79
End: 2019-07-26
Payer: MEDICARE

## 2019-07-26 ENCOUNTER — OFFICE VISIT (OUTPATIENT)
Dept: URGENT CARE | Facility: MEDICAL CENTER | Age: 79
End: 2019-07-26
Payer: MEDICARE

## 2019-07-26 VITALS
TEMPERATURE: 98 F | RESPIRATION RATE: 18 BRPM | HEART RATE: 73 BPM | DIASTOLIC BLOOD PRESSURE: 70 MMHG | SYSTOLIC BLOOD PRESSURE: 104 MMHG | OXYGEN SATURATION: 99 %

## 2019-07-26 VITALS — BODY MASS INDEX: 27.59 KG/M2 | DIASTOLIC BLOOD PRESSURE: 70 MMHG | WEIGHT: 132 LBS | SYSTOLIC BLOOD PRESSURE: 132 MMHG

## 2019-07-26 DIAGNOSIS — H81.10 BENIGN PAROXYSMAL POSITIONAL VERTIGO, UNSPECIFIED LATERALITY: Primary | ICD-10-CM

## 2019-07-26 DIAGNOSIS — N81.4 UTEROVAGINAL PROLAPSE: Primary | ICD-10-CM

## 2019-07-26 PROCEDURE — G0463 HOSPITAL OUTPT CLINIC VISIT: HCPCS | Performed by: PHYSICIAN ASSISTANT

## 2019-07-26 PROCEDURE — 99213 OFFICE O/P EST LOW 20 MIN: CPT | Performed by: PHYSICIAN ASSISTANT

## 2019-07-26 PROCEDURE — 57160 INSERT PESSARY/OTHER DEVICE: CPT | Performed by: OBSTETRICS & GYNECOLOGY

## 2019-07-26 PROCEDURE — 99212 OFFICE O/P EST SF 10 MIN: CPT | Performed by: OBSTETRICS & GYNECOLOGY

## 2019-07-26 NOTE — PROGRESS NOTES
Pessary  Date/Time: 7/26/2019 11:49 AM  Performed by: Reggie Waters DO  Authorized by: Reggie Waters DO     Consent:     Consent obtained:  Verbal    Consent given by:  Patient    Procedural risks discussed:  Bleeding and infection    Patient questions answered: yes      Patient agrees, verbalizes understanding, and wants to proceed: yes    Indication:     Indication for pessary: uterine prolapse    Pre-procedure:     Pessary procedure type:  Cleaning/check  Problems:     Pessary complications: unable to move bowels    Procedure:     Pessary type:  Ring w/ support    Patient tolerance of procedure: Tolerated well, no immediate complications  Comments:     Procedure comments:  Pessary was removed cleansed and replaced  Patient did have some consideration for leaving the pessary out after this next visit  She does want to get through her golf season prior to doing so  She does have some concerns of this continued issues with constipation  She is hopeful that leaving the pessary out for some time will help improve that  We discussed returning to the office in 4 months and if she would like we can leave the pessary out for a few months to see how she does

## 2019-07-26 NOTE — PROGRESS NOTES
[address]        NAME: Manuela Amin is a 77 y o  female  : 1940    MRN: [id number]  DATE: 2019  TIME: 12:50 PM    Assessment and Plan   Benign paroxysmal positional vertigo, unspecified laterality [H81 10]  1  Benign paroxysmal positional vertigo, unspecified laterality       Pt declined Meclizine  Patient Instructions       Follow up with PCP in 3-5 days  Proceed to  ER if symptoms worsen  Chief Complaint     Chief Complaint   Patient presents with    Earache     start yesterday with left ear pain          History of Present Illness       Patient is a 50-year-old female presenting for lightheadedness and dizziness since yesterday  She was in the building for her gynecology appointment and decided to stop by here to get her ear checked  Her left ear also feels annoying and she believes it is contributing to the dizziness  She notices her symptoms more when moving around, especially going to lay down in bed  She denies any symptoms at rest   She denies any fevers, chills, nausea, cold symptoms such as congestion or cough  Review of Systems   Review of Systems   Constitutional: Negative for chills, fatigue and fever  HENT: Positive for ear pain  Negative for congestion and ear discharge  Respiratory: Negative for cough  Gastrointestinal: Negative for nausea and vomiting  Neurological: Positive for dizziness and light-headedness  Current Medications       Current Outpatient Medications:     aspirin (ECOTRIN LOW STRENGTH) 81 mg EC tablet, Take 81 mg by mouth daily  , Disp: , Rfl:     atorvastatin (LIPITOR) 40 mg tablet, Take 1 tablet (40 mg total) by mouth daily, Disp: 90 tablet, Rfl: 3    Cholecalciferol (VITAMIN D3 PO), Take 2,000 Units by mouth daily  , Disp: , Rfl:     Docusate Sodium (COLACE PO), Take by mouth daily  , Disp: , Rfl:     EFFIENT tablet, TAKE 1 TABLET BY MOUTH EVERY OTHER DAY, Disp: 30 tablet, Rfl: 5    isosorbide mononitrate (IMDUR) 60 mg 24 hr tablet, TAKE 1 TABLET BY MOUTH EVERY DAY, Disp: 90 tablet, Rfl: 2    losartan (COZAAR) 50 mg tablet, TAKE 1 TABLET BY MOUTH EVERY DAY, Disp: 30 tablet, Rfl: 6    metoprolol succinate (TOPROL-XL) 25 mg 24 hr tablet, TAKE 1 TABLET BY MOUTH EVERY DAY, Disp: 30 tablet, Rfl: 9    multivitamin (THERAGRAN) TABS, Take 1 tablet by mouth daily  , Disp: , Rfl:     nitroglycerin (NITROSTAT) 0 4 mg SL tablet, Place 0 4 mg under the tongue every 5 (five) minutes as needed for chest pain , Disp: , Rfl:     RANEXA 500 MG 12 hr tablet, TAKE 1 TABLET EVERY 12 HOURS , Disp: 60 tablet, Rfl: 5    torsemide (DEMADEX) 20 mg tablet, Take 20 mg by mouth daily, Disp: , Rfl:     Current Allergies     Allergies as of 07/26/2019 - Reviewed 07/26/2019   Allergen Reaction Noted    Sulfa antibiotics Anaphylaxis 04/12/2016    Formaldehyde  05/31/2016    Codeine Palpitations 04/12/2016            The following portions of the patient's history were reviewed and updated as appropriate: allergies, current medications, past family history, past medical history, past social history, past surgical history and problem list      Past Medical History:   Diagnosis Date    Anal fissure     Cardiac disorder     Esophageal reflux     Esophagitis, reflux     Hemorrhoids     Hepatic hemangioma     Last Assessed: 1/13/2015    Herpes zoster     History of colonic polyps     Hypertension     [facility]     Lumbar herniated disc     Malignant neoplasm without specification of site ([facility] [facility]     Nephrolithiasis     L   Lithotripsy    Nontoxic single thyroid nodule     Last Assessed: 1/13/2015    Osteoarthritis     Overactive bladder     Raynaud disease     Respiratory system disease     [facility]ogren's disease [facility] [address] [facility]     Spinal stenosis     PONCHO (stress urinary incontinence, female)     Uterovaginal prolapse     Grade I-II       Past Surgical History:   Procedure Laterality Date    APPENDECTOMY  1947    CARDIAC SURGERY      CABG    CATARACT EXTRACTION Bilateral     COLONOSCOPY  2012    Fiberoptic    COLONOSCOPY      Resolved: 2006 - 2012 5 year f/u    CORONARY ANGIOPLASTY WITH STENT PLACEMENT      CORONARY ARTERY BYPASS GRAFT      Resolved: 2012    ESOPHAGOGASTRODUODENOSCOPY  2012    Diagnostic    HEMORROIDECTOMY      KNEE SURGERY      LITHOTRIPSY      Renal    MALIGNANT SKIN LESION EXCISION      Face; Resolved: 2004    NH ESOPHAGOGASTRODUODENOSCOPY TRANSORAL DIAGNOSTIC N/A 4/13/2016    Procedure: EGD AND COLONOSCOPY;  Surgeon: Harry Houser MD;  [facility]; Service: Gastroenterology    RENAL ARTERY STENT      SKIN LESION EXCISION      Scalp    SOFT TISSUE TUMOR RESECTION      Shoulder; Resolved: 1995    THROMBOLYSIS      Postoperative Thrombolysis PTCA    TONSILLECTOMY      Resolved: 65       Family History   Problem Relation Age of Onset    Heart disease Mother     Hypertension Mother     Osteoporosis Mother     Heart disease Father     Hypertension Father     Ulcerative colitis Family     Colon polyps Family          Medications have been verified  Objective   /70   Pulse 73   Temp 98 °F (36 7 °C) (Temporal)   Resp 18   SpO2 99%        Physical Exam     Physical Exam   Constitutional: She is oriented to person, place, and time  She appears well-developed and well-nourished  She does not appear ill  No distress  HENT:   Head: Normocephalic and atraumatic  Right Ear: Hearing, tympanic membrane, external ear and ear canal normal    Left Ear: Hearing, tympanic membrane, external ear and ear canal normal    Mouth/Throat: Oropharynx is clear and moist    Eyes: Pupils are equal, round, and reactive to light  Conjunctivae and EOM are normal    Cardiovascular: Normal rate, regular rhythm and normal heart sounds  Pulmonary/Chest: Effort normal and breath sounds normal    Neurological: She is alert and oriented to person, place, and time  Skin: Skin is warm and dry   She is not diaphoretic  Psychiatric: She has a normal mood and affect   Her behavior is normal

## 2019-08-01 ENCOUNTER — HOSPITAL ENCOUNTER (OUTPATIENT)
Dept: NON INVASIVE DIAGNOSTICS | Facility: CLINIC | Age: 79
Discharge: HOME | End: 2019-08-01
Payer: MEDICARE

## 2019-08-01 DIAGNOSIS — I65.23 ASYMPTOMATIC BILATERAL CAROTID ARTERY STENOSIS: ICD-10-CM

## 2019-08-01 DIAGNOSIS — I73.9 CLAUDICATION OF BOTH LOWER EXTREMITIES (HCC): ICD-10-CM

## 2019-08-01 DIAGNOSIS — I15.0 RENOVASCULAR HYPERTENSION: Chronic | ICD-10-CM

## 2019-08-01 PROCEDURE — 93923 UPR/LXTR ART STDY 3+ LVLS: CPT

## 2019-08-01 PROCEDURE — 93925 LOWER EXTREMITY STUDY: CPT

## 2019-08-01 PROCEDURE — 93880 EXTRACRANIAL BILAT STUDY: CPT

## 2019-08-01 PROCEDURE — 93975 VASCULAR STUDY: CPT

## 2019-08-03 ENCOUNTER — APPOINTMENT (EMERGENCY)
Dept: CT IMAGING | Facility: HOSPITAL | Age: 79
DRG: 394 | End: 2019-08-03
Payer: MEDICARE

## 2019-08-03 ENCOUNTER — HOSPITAL ENCOUNTER (INPATIENT)
Facility: HOSPITAL | Age: 79
LOS: 3 days | Discharge: HOME | DRG: 394 | End: 2019-08-06
Attending: EMERGENCY MEDICINE | Admitting: INTERNAL MEDICINE
Payer: MEDICARE

## 2019-08-03 DIAGNOSIS — I10 HYPERTENSION, UNSPECIFIED TYPE: ICD-10-CM

## 2019-08-03 DIAGNOSIS — I25.10 CORONARY ARTERIOSCLEROSIS: ICD-10-CM

## 2019-08-03 DIAGNOSIS — K52.9 ACUTE HEMORRHAGIC COLITIS: Primary | ICD-10-CM

## 2019-08-03 DIAGNOSIS — K92.1 HEMATOCHEZIA: ICD-10-CM

## 2019-08-03 DIAGNOSIS — K52.9 COLITIS: ICD-10-CM

## 2019-08-03 DIAGNOSIS — K55.9 ISCHEMIC COLITIS (HCC): ICD-10-CM

## 2019-08-03 PROBLEM — R65.10 SIRS (SYSTEMIC INFLAMMATORY RESPONSE SYNDROME) (HCC): Status: ACTIVE | Noted: 2019-08-03

## 2019-08-03 LAB
ABO GROUP BLD: NORMAL
ALBUMIN SERPL BCP-MCNC: 4 G/DL (ref 3.5–5)
ALP SERPL-CCNC: 87 U/L (ref 46–116)
ALT SERPL W P-5'-P-CCNC: 23 U/L (ref 12–78)
ANION GAP SERPL CALCULATED.3IONS-SCNC: 10 MMOL/L (ref 4–13)
APTT PPP: 24 SECONDS (ref 23–37)
AST SERPL W P-5'-P-CCNC: 19 U/L (ref 5–45)
BASOPHILS # BLD AUTO: 0.02 THOUSANDS/ΜL (ref 0–0.1)
BASOPHILS NFR BLD AUTO: 0 % (ref 0–1)
BILIRUB SERPL-MCNC: 1 MG/DL (ref 0.2–1)
BLD GP AB SCN SERPL QL: NEGATIVE
BUN SERPL-MCNC: 17 MG/DL (ref 5–25)
CALCIUM SERPL-MCNC: 9 MG/DL (ref 8.3–10.1)
CHLORIDE SERPL-SCNC: 103 MMOL/L (ref 100–108)
CO2 SERPL-SCNC: 27 MMOL/L (ref 21–32)
CREAT SERPL-MCNC: 0.91 MG/DL (ref 0.6–1.3)
EOSINOPHIL # BLD AUTO: 0 THOUSAND/ΜL (ref 0–0.61)
EOSINOPHIL NFR BLD AUTO: 0 % (ref 0–6)
ERYTHROCYTE [DISTWIDTH] IN BLOOD BY AUTOMATED COUNT: 12.9 % (ref 11.6–15.1)
GFR SERPL CREATININE-BSD FRML MDRD: 60 ML/MIN/1.73SQ M
GLUCOSE SERPL-MCNC: 134 MG/DL (ref 65–140)
HCT VFR BLD AUTO: 39.7 % (ref 34.8–46.1)
HCT VFR BLD AUTO: 42.5 % (ref 34.8–46.1)
HGB BLD-MCNC: 12.9 G/DL (ref 11.5–15.4)
HGB BLD-MCNC: 13.6 G/DL (ref 11.5–15.4)
IMM GRANULOCYTES # BLD AUTO: 0.04 THOUSAND/UL (ref 0–0.2)
IMM GRANULOCYTES NFR BLD AUTO: 0 % (ref 0–2)
INR PPP: 0.96 (ref 0.84–1.19)
LACTATE SERPL-SCNC: 2 MMOL/L (ref 0.5–2)
LIPASE SERPL-CCNC: 88 U/L (ref 73–393)
LYMPHOCYTES # BLD AUTO: 0.64 THOUSANDS/ΜL (ref 0.6–4.47)
LYMPHOCYTES NFR BLD AUTO: 5 % (ref 14–44)
MCH RBC QN AUTO: 30.1 PG (ref 26.8–34.3)
MCHC RBC AUTO-ENTMCNC: 32 G/DL (ref 31.4–37.4)
MCV RBC AUTO: 94 FL (ref 82–98)
MONOCYTES # BLD AUTO: 0.29 THOUSAND/ΜL (ref 0.17–1.22)
MONOCYTES NFR BLD AUTO: 2 % (ref 4–12)
NEUTROPHILS # BLD AUTO: 10.87 THOUSANDS/ΜL (ref 1.85–7.62)
NEUTS SEG NFR BLD AUTO: 93 % (ref 43–75)
NRBC BLD AUTO-RTO: 0 /100 WBCS
PLATELET # BLD AUTO: 204 THOUSANDS/UL (ref 149–390)
PMV BLD AUTO: 9.8 FL (ref 8.9–12.7)
POTASSIUM SERPL-SCNC: 4.1 MMOL/L (ref 3.5–5.3)
PROT SERPL-MCNC: 7.5 G/DL (ref 6.4–8.2)
PROTHROMBIN TIME: 12.2 SECONDS (ref 11.6–14.5)
RBC # BLD AUTO: 4.52 MILLION/UL (ref 3.81–5.12)
RH BLD: POSITIVE
SODIUM SERPL-SCNC: 140 MMOL/L (ref 136–145)
SPECIMEN EXPIRATION DATE: NORMAL
WBC # BLD AUTO: 11.86 THOUSAND/UL (ref 4.31–10.16)

## 2019-08-03 PROCEDURE — 86850 RBC ANTIBODY SCREEN: CPT | Performed by: EMERGENCY MEDICINE

## 2019-08-03 PROCEDURE — 74177 CT ABD & PELVIS W/CONTRAST: CPT

## 2019-08-03 PROCEDURE — 99223 1ST HOSP IP/OBS HIGH 75: CPT | Performed by: INTERNAL MEDICINE

## 2019-08-03 PROCEDURE — 85730 THROMBOPLASTIN TIME PARTIAL: CPT | Performed by: EMERGENCY MEDICINE

## 2019-08-03 PROCEDURE — 80053 COMPREHEN METABOLIC PANEL: CPT | Performed by: EMERGENCY MEDICINE

## 2019-08-03 PROCEDURE — 83690 ASSAY OF LIPASE: CPT | Performed by: EMERGENCY MEDICINE

## 2019-08-03 PROCEDURE — 85610 PROTHROMBIN TIME: CPT | Performed by: EMERGENCY MEDICINE

## 2019-08-03 PROCEDURE — 99285 EMERGENCY DEPT VISIT HI MDM: CPT

## 2019-08-03 PROCEDURE — 1123F ACP DISCUSS/DSCN MKR DOCD: CPT | Performed by: NURSE PRACTITIONER

## 2019-08-03 PROCEDURE — 83605 ASSAY OF LACTIC ACID: CPT | Performed by: EMERGENCY MEDICINE

## 2019-08-03 PROCEDURE — 96375 TX/PRO/DX INJ NEW DRUG ADDON: CPT

## 2019-08-03 PROCEDURE — 87493 C DIFF AMPLIFIED PROBE: CPT | Performed by: INTERNAL MEDICINE

## 2019-08-03 PROCEDURE — 85014 HEMATOCRIT: CPT | Performed by: INTERNAL MEDICINE

## 2019-08-03 PROCEDURE — 96374 THER/PROPH/DIAG INJ IV PUSH: CPT

## 2019-08-03 PROCEDURE — 86900 BLOOD TYPING SEROLOGIC ABO: CPT | Performed by: EMERGENCY MEDICINE

## 2019-08-03 PROCEDURE — 36415 COLL VENOUS BLD VENIPUNCTURE: CPT | Performed by: EMERGENCY MEDICINE

## 2019-08-03 PROCEDURE — 87040 BLOOD CULTURE FOR BACTERIA: CPT | Performed by: EMERGENCY MEDICINE

## 2019-08-03 PROCEDURE — 93975 VASCULAR STUDY: CPT | Performed by: SURGERY

## 2019-08-03 PROCEDURE — 96361 HYDRATE IV INFUSION ADD-ON: CPT

## 2019-08-03 PROCEDURE — 87177 OVA AND PARASITES SMEARS: CPT | Performed by: INTERNAL MEDICINE

## 2019-08-03 PROCEDURE — 93925 LOWER EXTREMITY STUDY: CPT | Performed by: SURGERY

## 2019-08-03 PROCEDURE — 93880 EXTRACRANIAL BILAT STUDY: CPT | Performed by: SURGERY

## 2019-08-03 PROCEDURE — 86901 BLOOD TYPING SEROLOGIC RH(D): CPT | Performed by: EMERGENCY MEDICINE

## 2019-08-03 PROCEDURE — 93922 UPR/L XTREMITY ART 2 LEVELS: CPT | Performed by: SURGERY

## 2019-08-03 PROCEDURE — 87209 SMEAR COMPLEX STAIN: CPT | Performed by: INTERNAL MEDICINE

## 2019-08-03 PROCEDURE — 85025 COMPLETE CBC W/AUTO DIFF WBC: CPT | Performed by: EMERGENCY MEDICINE

## 2019-08-03 PROCEDURE — 85018 HEMOGLOBIN: CPT | Performed by: INTERNAL MEDICINE

## 2019-08-03 PROCEDURE — 87505 NFCT AGENT DETECTION GI: CPT | Performed by: INTERNAL MEDICINE

## 2019-08-03 PROCEDURE — 99285 EMERGENCY DEPT VISIT HI MDM: CPT | Performed by: EMERGENCY MEDICINE

## 2019-08-03 RX ORDER — LOSARTAN POTASSIUM 50 MG/1
50 TABLET ORAL DAILY
Status: DISCONTINUED | OUTPATIENT
Start: 2019-08-03 | End: 2019-08-06 | Stop reason: HOSPADM

## 2019-08-03 RX ORDER — PRASUGREL 10 MG/1
10 TABLET, FILM COATED ORAL EVERY OTHER DAY
Status: DISCONTINUED | OUTPATIENT
Start: 2019-08-03 | End: 2019-08-03

## 2019-08-03 RX ORDER — MELATONIN
2000 DAILY
Status: DISCONTINUED | OUTPATIENT
Start: 2019-08-03 | End: 2019-08-06 | Stop reason: HOSPADM

## 2019-08-03 RX ORDER — ATORVASTATIN CALCIUM 40 MG/1
40 TABLET, FILM COATED ORAL DAILY
Status: DISCONTINUED | OUTPATIENT
Start: 2019-08-03 | End: 2019-08-06 | Stop reason: HOSPADM

## 2019-08-03 RX ORDER — ASPIRIN 81 MG/1
81 TABLET ORAL DAILY
Status: DISCONTINUED | OUTPATIENT
Start: 2019-08-03 | End: 2019-08-06 | Stop reason: HOSPADM

## 2019-08-03 RX ORDER — NITROGLYCERIN 0.4 MG/1
0.4 TABLET SUBLINGUAL
Status: DISCONTINUED | OUTPATIENT
Start: 2019-08-03 | End: 2019-08-06 | Stop reason: HOSPADM

## 2019-08-03 RX ORDER — SODIUM CHLORIDE 9 MG/ML
50 INJECTION, SOLUTION INTRAVENOUS CONTINUOUS
Status: DISCONTINUED | OUTPATIENT
Start: 2019-08-03 | End: 2019-08-04

## 2019-08-03 RX ORDER — CLOPIDOGREL BISULFATE 75 MG/1
75 TABLET ORAL EVERY OTHER DAY
COMMUNITY
End: 2019-08-08 | Stop reason: CLARIF

## 2019-08-03 RX ORDER — HEPARIN SODIUM 5000 [USP'U]/ML
5000 INJECTION, SOLUTION INTRAVENOUS; SUBCUTANEOUS EVERY 8 HOURS SCHEDULED
Status: DISCONTINUED | OUTPATIENT
Start: 2019-08-03 | End: 2019-08-03

## 2019-08-03 RX ORDER — METOPROLOL SUCCINATE 25 MG/1
25 TABLET, EXTENDED RELEASE ORAL DAILY
Status: DISCONTINUED | OUTPATIENT
Start: 2019-08-03 | End: 2019-08-06 | Stop reason: HOSPADM

## 2019-08-03 RX ORDER — ISOSORBIDE MONONITRATE 60 MG/1
60 TABLET, EXTENDED RELEASE ORAL DAILY
Status: DISCONTINUED | OUTPATIENT
Start: 2019-08-03 | End: 2019-08-06 | Stop reason: HOSPADM

## 2019-08-03 RX ORDER — TORSEMIDE 20 MG/1
20 TABLET ORAL DAILY
Status: DISCONTINUED | OUTPATIENT
Start: 2019-08-03 | End: 2019-08-06 | Stop reason: HOSPADM

## 2019-08-03 RX ORDER — CIPROFLOXACIN 2 MG/ML
400 INJECTION, SOLUTION INTRAVENOUS EVERY 12 HOURS
Status: DISCONTINUED | OUTPATIENT
Start: 2019-08-03 | End: 2019-08-04

## 2019-08-03 RX ORDER — RANOLAZINE 500 MG/1
500 TABLET, EXTENDED RELEASE ORAL EVERY 12 HOURS SCHEDULED
Status: DISCONTINUED | OUTPATIENT
Start: 2019-08-03 | End: 2019-08-06 | Stop reason: HOSPADM

## 2019-08-03 RX ORDER — DICYCLOMINE HYDROCHLORIDE 10 MG/1
10 CAPSULE ORAL 4 TIMES DAILY PRN
Status: DISCONTINUED | OUTPATIENT
Start: 2019-08-03 | End: 2019-08-06 | Stop reason: HOSPADM

## 2019-08-03 RX ORDER — CLOPIDOGREL BISULFATE 75 MG/1
75 TABLET ORAL EVERY OTHER DAY
Status: DISCONTINUED | OUTPATIENT
Start: 2019-08-03 | End: 2019-08-03

## 2019-08-03 RX ORDER — ONDANSETRON 2 MG/ML
4 INJECTION INTRAMUSCULAR; INTRAVENOUS ONCE
Status: COMPLETED | OUTPATIENT
Start: 2019-08-03 | End: 2019-08-03

## 2019-08-03 RX ADMIN — DICYCLOMINE HYDROCHLORIDE 10 MG: 10 CAPSULE ORAL at 18:11

## 2019-08-03 RX ADMIN — METOPROLOL SUCCINATE 25 MG: 25 TABLET, EXTENDED RELEASE ORAL at 11:57

## 2019-08-03 RX ADMIN — LOSARTAN POTASSIUM 50 MG: 50 TABLET, FILM COATED ORAL at 11:52

## 2019-08-03 RX ADMIN — METRONIDAZOLE 500 MG: 500 INJECTION, SOLUTION INTRAVENOUS at 10:14

## 2019-08-03 RX ADMIN — TORSEMIDE 20 MG: 20 TABLET ORAL at 11:57

## 2019-08-03 RX ADMIN — METRONIDAZOLE 500 MG: 500 INJECTION, SOLUTION INTRAVENOUS at 17:08

## 2019-08-03 RX ADMIN — ISOSORBIDE MONONITRATE 60 MG: 60 TABLET, EXTENDED RELEASE ORAL at 11:52

## 2019-08-03 RX ADMIN — SODIUM CHLORIDE 1000 ML: 0.9 INJECTION, SOLUTION INTRAVENOUS at 08:15

## 2019-08-03 RX ADMIN — RANOLAZINE 500 MG: 500 TABLET, FILM COATED, EXTENDED RELEASE ORAL at 21:21

## 2019-08-03 RX ADMIN — VITAMIN D, TAB 1000IU (100/BT) 2000 UNITS: 25 TAB at 11:52

## 2019-08-03 RX ADMIN — MORPHINE SULFATE 2 MG: 2 INJECTION, SOLUTION INTRAMUSCULAR; INTRAVENOUS at 08:21

## 2019-08-03 RX ADMIN — SODIUM CHLORIDE 50 ML/HR: 0.9 INJECTION, SOLUTION INTRAVENOUS at 11:54

## 2019-08-03 RX ADMIN — ONDANSETRON 4 MG: 2 INJECTION INTRAMUSCULAR; INTRAVENOUS at 08:17

## 2019-08-03 RX ADMIN — IOHEXOL 100 ML: 350 INJECTION, SOLUTION INTRAVENOUS at 08:57

## 2019-08-03 RX ADMIN — CIPROFLOXACIN 400 MG: 2 INJECTION, SOLUTION INTRAVENOUS at 21:22

## 2019-08-03 RX ADMIN — ATORVASTATIN CALCIUM 40 MG: 40 TABLET, FILM COATED ORAL at 11:52

## 2019-08-03 RX ADMIN — RANOLAZINE 500 MG: 500 TABLET, FILM COATED, EXTENDED RELEASE ORAL at 11:52

## 2019-08-03 RX ADMIN — ASPIRIN 81 MG: 81 TABLET, COATED ORAL at 11:57

## 2019-08-03 RX ADMIN — CIPROFLOXACIN 400 MG: 2 INJECTION, SOLUTION INTRAVENOUS at 11:54

## 2019-08-03 NOTE — ASSESSMENT & PLAN NOTE
[facility] is [facility] and patient is tachycardic  In setting of colitis  We are treating for infection for now  Would appreciate GI input

## 2019-08-03 NOTE — PLAN OF CARE
Problem: Potential for Falls  Goal: Patient will remain free of falls  Description  INTERVENTIONS:  - Assess patient frequently for physical needs  -  Identify cognitive and physical deficits and behaviors that affect risk of falls    -  Coleman fall precautions as indicated by assessment   - Educate patient/family on patient safety including physical limitations  - Instruct patient to call for assistance with activity based on assessment  - Modify environment to reduce risk of injury  - Consider OT/PT consult to assist with strengthening/mobility  Outcome: Progressing     Problem: GASTROINTESTINAL - ADULT  Goal: Minimal or absence of nausea and/or vomiting  Description  INTERVENTIONS:  - Administer IV fluids as ordered to ensure adequate hydration  - Maintain NPO status until nausea and vomiting are resolved  - Nasogastric tube as ordered  - Administer ordered antiemetic medications as needed  - Provide nonpharmacologic comfort measures as appropriate  - Advance diet as tolerated, if ordered  - Nutrition services referral to assist patient with adequate nutrition and appropriate food choices  Outcome: Progressing  Goal: Maintains or returns to baseline bowel function  Description  INTERVENTIONS:  - Assess bowel function  - Encourage oral fluids to ensure adequate hydration  - Administer IV fluids as ordered to ensure adequate hydration  - Administer ordered medications as needed  - Encourage mobilization and activity  - Nutrition services referral to assist patient with appropriate food choices  Outcome: Progressing  Goal: Maintains adequate nutritional intake  Description  INTERVENTIONS:  - Monitor percentage of each meal consumed  - Identify factors contributing to decreased intake, treat as appropriate  - Assist with meals as needed  - Monitor I&O, WT and lab values  - Obtain nutrition services referral as needed  Outcome: Progressing  Goal: Establish and maintain optimal ostomy function  Description  INTERVENTIONS:  - Assess bowel function  - Encourage oral fluids to ensure adequate hydration  - Administer IV fluids as ordered to ensure adequate hydration  - Administer ordered medications as needed  - Encourage mobilization and activity  - Nutrition services referral to assist patient with appropriate food choices  - Assess stoma site  Outcome: Progressing

## 2019-08-03 NOTE — ED NOTES
Patient transported to room 317, receiving RN notified of arrival     Brittany Tolentino  08/03/19 1038

## 2019-08-03 NOTE — ASSESSMENT & PLAN NOTE
Could be ischemic given history  Will consult GI  Will trend H&H as patient is having bloody bowel movement  Will check cultures including C-diff  Lactic acid normal   Will give IVF  Clear liquid diet

## 2019-08-03 NOTE — ED PROVIDER NOTES
History  Chief Complaint   Patient presents with    Black or Bloody Stool     Pt presents to ED from home due to c/o loose bloody stools (x5-10) during the night  Has had this occur in the past, unsure of tx  Additional complaints: generalized ABD pain, n/v for past week  Denies SOB, fatigue, dizziness   Abdominal Pain       History provided by:  Patient  Black or Bloody Stool   Quality:  Bright red  Amount: Moderate  Duration:  5 hours  Timing:  Intermittent (7 episodes)  Chronicity:  New  Context: diarrhea and spontaneously    Context: not constipation, not hemorrhoids and not rectal injury    Similar prior episodes: no    Relieved by:  None tried  Worsened by:  Nothing  Ineffective treatments:  None tried  Associated symptoms: abdominal pain    Associated symptoms: no dizziness, no fever, no light-headedness and no vomiting    Abdominal pain:     Location:  RLQ and LLQ    Quality: aching      Severity:  Moderate    Onset quality:  Gradual    Duration:  12 hours    Timing:  Constant    Progression:  Unchanged    Chronicity:  New  Risk factors: no anticoagulant use ( But does take Plavix)        Prior to Admission Medications   Prescriptions Last Dose Informant Patient Reported? Taking? Cholecalciferol (VITAMIN D3 PO)  Self Yes No   Sig: Take 2,000 Units by mouth daily     Docusate Sodium (COLACE PO)  Self Yes No   Sig: Take by mouth daily     EFFIENT tablet  Self No No   Sig: TAKE 1 TABLET BY MOUTH EVERY OTHER DAY   RANEXA 500 MG 12 hr tablet  Self No No   Sig: TAKE 1 TABLET EVERY 12 HOURS  aspirin (ECOTRIN LOW STRENGTH) 81 mg EC tablet  Self Yes No   Sig: Take 81 mg by mouth daily     atorvastatin (LIPITOR) 40 mg tablet  Self No No   Sig: Take 1 tablet (40 mg total) by mouth daily   clopidogrel (PLAVIX) 75 mg tablet   Yes Yes   Sig: Take 75 mg by mouth every other day    isosorbide mononitrate (IMDUR) 60 mg 24 hr tablet  Self No No   Sig: TAKE 1 TABLET BY MOUTH EVERY DAY   losartan (COZAAR) 50 mg tablet Self No No   Sig: TAKE 1 TABLET BY MOUTH EVERY DAY   metoprolol succinate (TOPROL-XL) 25 mg 24 hr tablet  Self No No   Sig: TAKE 1 TABLET BY MOUTH EVERY DAY   multivitamin (THERAGRAN) TABS  Self Yes No   Sig: Take 1 tablet by mouth daily  nitroglycerin (NITROSTAT) 0 4 mg SL tablet  Self Yes No   Sig: Place 0 4 mg under the tongue every 5 (five) minutes as needed for chest pain  torsemide (DEMADEX) 20 mg tablet  Self Yes No   Sig: Take 20 mg by mouth daily      Facility-Administered Medications: None       Past Medical History:   Diagnosis Date    Anal fissure     Cardiac disorder     Esophageal reflux     Esophagitis, reflux     Hemorrhoids     Hepatic hemangioma     Last Assessed: 1/13/2015    Herpes zoster     History of colonic polyps     Hypertension     [facility] [address] [facility]     Lumbar herniated disc     [facility]     Nephrolithiasis     L  Lithotripsy    Nontoxic single thyroid nodule     Last Assessed: 1/13/2015    Osteoarthritis     Overactive bladder     Raynaud disease     Respiratory system disease     Sjogren's disease ([facility] [address] [facility]     Spinal stenosis     PONCHO (stress urinary incontinence, female)     Uterovaginal prolapse     Grade I-II       Past Surgical History:   Procedure Laterality Date    APPENDECTOMY  1947    CARDIAC SURGERY      CABG    CATARACT EXTRACTION Bilateral     COLONOSCOPY  2012    Fiberoptic    COLONOSCOPY      Resolved: 2006 - 2012 5 year f/u    CORONARY ANGIOPLASTY WITH STENT PLACEMENT      CORONARY ARTERY BYPASS GRAFT      Resolved: 2012    ESOPHAGOGASTRODUODENOSCOPY  2012    Diagnostic    HEMORROIDECTOMY      KNEE SURGERY      LITHOTRIPSY      Renal    MALIGNANT SKIN LESION EXCISION      Face; Resolved: 2004    DE ESOPHAGOGASTRODUODENOSCOPY TRANSORAL DIAGNOSTIC N/A 4/13/2016    Procedure: EGD AND COLONOSCOPY;  Surgeon: Dandre Smith MD;  [facility];   Service: Gastroenterology    RENAL ARTERY STENT      SKIN LESION EXCISION      Scalp    SOFT TISSUE TUMOR RESECTION      Shoulder; Resolved: 1995    THROMBOLYSIS      Postoperative Thrombolysis PTCA    TONSILLECTOMY      Resolved: 65       Family History   Problem Relation Age of Onset    Heart disease Mother     Hypertension Mother     Osteoporosis Mother     Heart disease Father     Hypertension Father     Ulcerative colitis Family     Colon polyps Family      I have reviewed and agree with the history as documented  Social History     Tobacco Use    Smoking status: Never Smoker    Smokeless tobacco: Never Used   Substance Use Topics    Alcohol use: Yes     Comment: social    Drug use: No        Review of Systems   Constitutional: Negative for activity change, chills, diaphoresis and fever  HENT: Negative for congestion, sinus pressure and sore throat  Eyes: Negative for pain and visual disturbance  Respiratory: Negative for cough, chest tightness, shortness of breath, wheezing and stridor  Cardiovascular: Negative for chest pain and palpitations  Gastrointestinal: Positive for abdominal pain and blood in stool  Negative for abdominal distention, constipation, diarrhea, nausea and vomiting  Genitourinary: Negative for dysuria and frequency  Musculoskeletal: Negative for neck pain and neck stiffness  Skin: Negative for rash  Neurological: Negative for dizziness, speech difficulty, light-headedness, numbness and headaches  Physical Exam  Physical Exam   Constitutional: She is oriented to person, place, and time  She appears well-developed  No distress  HENT:   Head: Normocephalic and atraumatic  Eyes: Pupils are equal, round, and reactive to light  Neck: Normal range of motion  Neck supple  No tracheal deviation present  Cardiovascular: Normal rate, regular rhythm, normal heart sounds and intact distal pulses  No murmur heard  Pulmonary/Chest: Effort normal and breath sounds normal  No stridor   No respiratory distress  Abdominal: Soft  She exhibits no distension  There is tenderness in the right lower quadrant and left lower quadrant  There is no rebound and no guarding  Musculoskeletal: Normal range of motion  Neurological: She is alert and oriented to person, place, and time  Skin: Skin is warm and dry  She is not diaphoretic  No erythema  No pallor  Psychiatric: She has a normal mood and affect  Vitals reviewed        Vital Signs  ED Triage Vitals   Temperature Pulse Respirations Blood Pressure SpO2   08/03/19 0734 08/03/19 0734 08/03/19 0734 08/03/19 0734 08/03/19 0734   98 °F (36 7 °C) 91 16 129/58 97 %      Temp Source Heart Rate Source Patient Position - Orthostatic VS BP Location FiO2 (%)   08/03/19 0734 08/03/19 0734 08/03/19 0734 08/03/19 0734 --   Oral Monitor Sitting Right arm       Pain Score       08/03/19 0821       5           Vitals:    08/03/19 0734 08/03/19 0851   BP: 129/58 (!) 175/72   Pulse: 91 86   Patient Position - Orthostatic VS: Sitting          Visual Acuity      ED Medications  Medications   ciprofloxacin (CIPRO) IVPB (premix) 400 mg (has no administration in time range)   metroNIDAZOLE (FLAGYL) IVPB (premix) 500 mg (has no administration in time range)   sodium chloride 0 9 % infusion (has no administration in time range)   sodium chloride 0 9 % bolus 1,000 mL (1,000 mL Intravenous New Bag 8/3/19 0815)   ondansetron (ZOFRAN) injection 4 mg (4 mg Intravenous Given 8/3/19 0817)   morphine injection 2 mg (2 mg Intravenous Given 8/3/19 0821)   iohexol (OMNIPAQUE) 350 MG/ML injection (MULTI-DOSE) 100 mL (100 mL Intravenous Given 8/3/19 0857)       Diagnostic Studies  Results Reviewed     Procedure Component Value Units Date/Time    Blood culture #1 [[id number]]     Lab Status:  No result Specimen:  Blood     Blood culture #2 [[id number]]     Lab Status:  No result Specimen:  Blood     Stool Enteric Bacterial Panel by PCR [[id number]]     Lab Status:  No result Specimen:  Stool Ova and parasite examination [[id number]]     Lab Status:  No result Specimen:  Stool     Clostridium difficile toxin by PCR [[id number]]     Lab Status:  No result Specimen:  Stool from Per Rectum     CBC and differential [[id number]]  (Abnormal) Collected:  08/03/19 0817    Lab Status:  Final result Specimen:  Blood from Arm, Right Updated:  08/03/19 0855     WBC 11 86 Thousand/uL      RBC 4 52 Million/uL      Hemoglobin 13 6 g/dL      Hematocrit 42 5 %      MCV 94 fL      MCH 30 1 pg      MCHC 32 0 g/dL      RDW 12 9 %      MPV 9 8 fL      Platelets 106 Thousands/uL      nRBC 0 /100 WBCs      Neutrophils Relative 93 %      Immat GRANS % 0 %      Lymphocytes Relative 5 %      Monocytes Relative 2 %      Eosinophils Relative 0 %      Basophils Relative 0 %      Neutrophils Absolute 10 87 Thousands/µL      Immature Grans Absolute 0 04 Thousand/uL      Lymphocytes Absolute 0 64 Thousands/µL      Monocytes Absolute 0 29 Thousand/µL      Eosinophils Absolute 0 00 Thousand/µL      Basophils Absolute 0 02 Thousands/µL     Narrative: This is an appended report  These results have been appended to a previously verified report  Lactic acid, plasma [[id number]]  (Normal) Collected:  08/03/19 0817    Lab Status:  Final result Specimen:  Blood from Arm, Right Updated:  08/03/19 0853     LACTIC ACID 2 0 mmol/L     Narrative:       Result may be elevated if tourniquet was used during collection      Comprehensive metabolic panel [[id number]] Collected:  08/03/19 0817    Lab Status:  Final result Specimen:  Blood from Arm, Right Updated:  08/03/19 0843     Sodium 140 mmol/L      Potassium 4 1 mmol/L      Chloride 103 mmol/L      CO2 27 mmol/L      ANION GAP 10 mmol/L      BUN 17 mg/dL      Creatinine 0 91 mg/dL      Glucose 134 mg/dL      Calcium 9 0 mg/dL      AST 19 U/L      ALT 23 U/L      Alkaline Phosphatase 87 U/L      Total Protein 7 5 g/dL      Albumin 4 0 g/dL      Total Bilirubin 1 00 mg/dL      eGFR 60 ml/min/1 73sq m     Narrative:       National Kid[employer] Disease Foundation guidelines for Chronic Kidney Disease (CKD):     Stage 1 with normal or high GFR (GFR > 90 mL/min/1 73 square meters)    Stage 2 Mild CKD (GFR = 60-89 mL/min/1 73 square meters)    Stage 3A Moderate CKD (GFR = 45-59 mL/min/1 73 square meters)    Stage 3B Moderate CKD (GFR = 30-44 mL/min/1 73 square meters)    Stage 4 Severe CKD (GFR = 15-29 mL/min/1 73 square meters)    Stage 5 End Stage CKD (GFR <15 mL/min/1 73 square meters)  Note: GFR calculation is accurate only with a steady state creatinine    Lipase [[id number]]  (Normal) Collected:  08/03/19 0817    Lab Status:  Final result Specimen:  Blood from Arm, Right Updated:  08/03/19 0843     Lipase 88 u/L     Protime-INR [[id number]]  (Normal) Collected:  08/03/19 0817    Lab Status:  Final result Specimen:  Blood from Arm, Right Updated:  08/03/19 0838     Protime 12 2 seconds      INR 0 96    APTT [[id number]]  (Normal) Collected:  08/03/19 0817    Lab Status:  Final result Specimen:  Blood from Arm, Right Updated:  08/03/19 0838     PTT 24 seconds                  CT abdomen pelvis with contrast   Final Result by Ha Justin MD (08/03 4232)      Left-sided colitis with no bowel obstruction or pneumatosis intestinalis  Workstation performed: [id number]                    Procedures  Procedures       ED Course  ED Course as of Aug 03 0929   Sat Aug 03, 2019   7079 Patient reports large amount of improvement after morphine  Await CT scan                      Initial Sepsis Screening     Row Name 08/03/19 6973                Is the patient's history suggestive of a new or worsening infection? (!) Yes (Proceed)  -EP        Suspected source of infection          Are two or more of the following signs & symptoms of infection both present and new to the patient?   No  -EP        Indicate SIRS criteria          If the answer is yes to both questions, suspicion of sepsis is present          If severe sepsis is present AND tissue hypoperfusion perists in the hour after fluid resuscitation or lactate > 4, the patient meets criteria for SEPTIC SHOCK          Are any of the following organ dysfunction criteria present within 6 hours of suspected infection and SIRS criteria that are NOT considered to be chronic conditions?         Organ dysfunction          Date of presentation of severe sepsis          Time of presentation of severe sepsis          Tissue hypoperfusion persists in the hour after crystalloid fluid administration, evidenced, by either:          Was hypotension present within one hour of the conclusion of crystalloid fluid administration?           Date of presentation of septic shock          Time of presentation of septic shock            User Key  (r) = Recorded By, (t) = Taken By, (c) = Cosigned By    [address] Name Provider Cammie Lloyd MD Physician                  MDM  Number of Diagnoses or Management Options  Acute hemorrhagic colitis: new and requires workup  Hematochezia: new and requires workup     Amount and/or Complexity of Data Reviewed  Clinical lab tests: ordered and reviewed  Tests in the radiology section of CPT®: ordered and reviewed  Decide to obtain previous medical records or to obtain history from someone other than the patient: yes  Obtain history from someone other than the patient: yes  Review and summarize past medical records: yes  Independent visualization of images, tracings, or specimens: yes        Disposition  Final diagnoses:   Acute hemorrhagic colitis   Hematochezia     Time reflects when diagnosis was documented in both MDM as applicable and the Disposition within this note     Time User Action Codes Description Comment    8/3/2019  9:29 AM Kelli Clinton [K52 9] Acute hemorrhagic colitis     8/3/2019  9:29 AM Kelli Clinton [K92 1] Hematochezia       ED Disposition     ED Disposition Condition Date/Time Comment    Admit Stable Sat Aug 3, 2019  9:29 AM Case was discussed with Dr Saleem Pena and the patient's admission status was agreed to be Admission Status: inpatient status to the service of Dr Saleem Pena   Follow-up Information    None         Patient's Medications   Discharge Prescriptions    No medications on file     No discharge procedures on file      ED Provider  Electronically Signed by           Mariano Valdes DO  08/03/19 4151

## 2019-08-03 NOTE — H&P
H&P- Mane Weinstein 1940, 77 y o  female MRN: [id number]    Unit/Bed#: -01 Encounter: [id number]    Primary Care Provider: Kim Lemos MD   Date and time admitted to hospital: 8/3/2019  7:31 AM        SIRS (systemic inflammatory response syndrome) ([facility] [address] )  Assessment & Plan  WCC is 11 98 and patient is tachycardic  In setting of colitis  We are treating for infection for now  Would appreciate GI input  Colitis  Assessment & Plan  Could be ischemic given history  Will consult GI  Will trend H&H as patient is having bloody bowel movement  Will check cultures including C-diff  Lactic acid normal   Will give IVF  Clear liquid diet  HTN (hypertension)  Assessment & Plan  BP is 151/66   Will continue with home meds  VTE Prophylaxis: hold off on heparin given bloody bowel movements    / sequential compression device   Code Status: Full Code  POLST: There is no POLST form on file for this patient (pre-hospital)  Discussion with family: discussed with patient and her   Anticipated Length of Stay:  Patient will be admitted on an Inpatient basis with an anticipated length of stay of  More than 2 midnights  Justification for Hospital Stay: colitis  Total Time for Visit, including Counseling / Coordination of Care: 45 minutes  Greater than 50% of this total time spent on direct patient counseling and coordination of care  Chief Complaint:     Left lower quadrant pain    History of Present Illness:    Mane Weinstein is a 77 y o  female who presents with lower abdominal pain associated with loose bloody stools approximately 5-10 overnight and through the course of stay yesterday  The patient has never had similar complaints in the past however she does have a background of vascular disease  The patient otherwise her count of 11 98 in the ER and is tachycardic in the 90s      When I see her after her some IV fluids and antibiotics the patient feels a little better however still complaining of lower abdominal pain  She has also had some bloody loose bowel movements since she came into the emergency department  She is on aspirin Plavix and Effient at home  Other than lower abdominal pain and diarrhea loose bloody stools the patient denies any other symptoms  She specifically denies fevers chills nausea vomiting chest pain shortness of breath  Review of Systems:    Review of Systems   Constitutional: Negative  HENT: Negative  Eyes: Negative  Respiratory: Negative  Cardiovascular: Negative  Gastrointestinal: Positive for abdominal pain, blood in stool and diarrhea  Negative for abdominal distention, anal bleeding, constipation, nausea, rectal pain and vomiting  Endocrine: Negative  Musculoskeletal: Negative  Skin: Negative  Neurological: Negative  Hematological: Negative  Psychiatric/Behavioral: Negative  Past Medical and Surgical History:     Past Medical History:   Diagnosis Date    Anal fissure     Cardiac disorder     Esophageal reflux     Esophagitis, reflux     Hemorrhoids     Hepatic hemangioma     Last Assessed: 1/13/2015    Herpes zoster     History of colonic polyps     Hypertension     Ischemic colitis ([facility] [address] )     Lumbar herniated disc     Malignant neoplasm without specification of site ([facility] [address] )     Nephrolithiasis     L   Lithotripsy    Nontoxic single thyroid nodule     Last Assessed: 1/13/2015    Osteoarthritis     Overactive bladder     Raynaud disease     Respiratory system disease     Sjogren's disease ([facility] [address] )     Spinal stenosis     PONCHO (stress urinary incontinence, female)     Uterovaginal prolapse     Grade I-II       Past Surgical History:   Procedure Laterality Date    APPENDECTOMY  1947    CARDIAC SURGERY      CABG    CATARACT EXTRACTION Bilateral     COLONOSCOPY  2012    Fiberoptic    COLONOSCOPY      Resolved: 2006 - 2012 5 year f/u    CORONARY ANGIOPLASTY WITH STENT PLACEMENT      CORONARY ARTERY BYPASS GRAFT      Resolved: 2012    ESOPHAGOGASTRODUODENOSCOPY  2012    Diagnostic    HEMORROIDECTOMY      KNEE SURGERY      LITHOTRIPSY      Renal    MALIGNANT SKIN LESION EXCISION      Face; Resolved: 2004    CT ESOPHAGOGASTRODUODENOSCOPY TRANSORAL DIAGNOSTIC N/A 4/13/2016    Procedure: EGD AND COLONOSCOPY;  Surgeon: Evonne Tee MD;  [facility]; Service: Gastroenterology    RENAL ARTERY STENT      SKIN LESION EXCISION      Scalp    SOFT TISSUE TUMOR RESECTION      Shoulder; Resolved: 1995    THROMBOLYSIS      Postoperative Thrombolysis PTCA    TONSILLECTOMY      Resolved: 1944       Meds/Allergies:    Prior to Admission medications    Medication Sig Start Date End Date Taking? Authorizing Provider   aspirin (ECOTRIN LOW STRENGTH) 81 mg EC tablet Take 81 mg by mouth daily  Yes Historical Provider, MD   atorvastatin (LIPITOR) 40 mg tablet Take 1 tablet (40 mg total) by mouth daily 1/28/19  Yes Fernando Emerson MD   Cholecalciferol (VITAMIN D3 PO) Take 2,000 Units by mouth daily     Yes Historical Provider, MD   Docusate Sodium (COLACE PO) Take by mouth daily     Yes Historical Provider, MD   EFFIENT tablet TAKE 1 TABLET BY MOUTH EVERY OTHER DAY 11/14/18  Yes Holden Medina MD   isosorbide mononitrate (IMDUR) 60 mg 24 hr tablet TAKE 1 TABLET BY MOUTH EVERY DAY 8/2/18  Yes Holden Medina MD   losartan (COZAAR) 50 mg tablet TAKE 1 TABLET BY MOUTH EVERY DAY 6/6/19  Yes Holden Medina MD   metoprolol succinate (TOPROL-XL) 25 mg 24 hr tablet TAKE 1 TABLET BY MOUTH EVERY DAY 6/6/19  Yes Holden Medina MD   multivitamin [facility]) TABS Take 1 tablet by mouth daily     Yes Historical Provider, MD   RANEXA 500 MG 12 hr tablet TAKE 1 TABLET EVERY 12 HOURS  7/8/18  Yes Holden Medina MD   torsemide [facility]) 20 mg tablet Take 20 mg by mouth daily   Yes Historical Provider, MD   clopidogrel (PLAVIX) 75 mg tablet Take 75 mg by mouth every other day Historical Provider, MD   nitroglycerin (NITROSTAT) 0 4 mg SL tablet Place 0 4 mg under the tongue every 5 (five) minutes as needed for chest pain  Historical Provider, MD     I have reviewed home medications with patient personally  Allergies: Allergies   Allergen Reactions    Sulfa Antibiotics Anaphylaxis    Formaldehyde     Codeine Palpitations       Social History:     Marital Status: /Civil Union   Occupation: retired  Patient Pre-hospital Living Situation: lives with   Patient Pre-hospital Level of Mobility: fully  Patient Pre-hospital Diet Restrictions: none  Substance Use History:   Social History     Substance and Sexual Activity   Alcohol Use Yes    Comment: social     Social History     Tobacco Use   Smoking Status Never Smoker   Smokeless Tobacco Never Used     Social History     Substance and Sexual Activity   Drug Use No       Family History:    Family History   Problem Relation Age of Onset    Heart disease Mother     Hypertension Mother     Osteoporosis Mother     Heart disease Father     Hypertension Father     Ulcerative colitis Family     Colon polyps Family        Physical Exam:     Vitals:   Blood Pressure: 151/66 (08/03/19 1501)  Pulse: 90 (08/03/19 1501)  Temperature: 97 8 °F (36 6 °C) (08/03/19 1501)  Temp Source: Oral (08/03/19 1501)  Respirations: 16 (08/03/19 1501)  Height: 4' 10" (147 3 cm) (08/03/19 1046)  Weight - Scale: 59 8 kg (131 lb 12 8 oz) (08/03/19 1046)  SpO2: 97 % (08/03/19 1501)    Physical Exam   Constitutional: She appears well-developed  No distress  HENT:   Head: Normocephalic and atraumatic  Mouth/Throat: No oropharyngeal exudate  Eyes: Right eye exhibits no discharge  Left eye exhibits no discharge  No scleral icterus  Neck: Normal range of motion  No JVD present  No tracheal deviation present  No thyromegaly present  Cardiovascular: Exam reveals no friction rub  No murmur heard  Tachycardic      Pulmonary/Chest: Effort normal  No stridor  No respiratory distress  She has no wheezes  She has no rales  She exhibits no tenderness  Abdominal: Soft  Bowel sounds are normal  She exhibits no distension and no mass  There is tenderness  There is no rebound and no guarding  No hernia  Musculoskeletal: She exhibits no edema or deformity  Lymphadenopathy:     She has no cervical adenopathy  Neurological: She is alert  No cranial nerve deficit or sensory deficit  She exhibits normal muscle tone  Coordination normal    Skin: Capillary refill takes less than 2 seconds  No rash noted  She is not diaphoretic  No erythema  There is pallor  Psychiatric: She has a normal mood and affect  Additional Data:     Lab Results: I have personally reviewed pertinent reports  Results from last 7 days   Lab Units 08/03/19  0817   WBC Thousand/uL 11 86*   HEMOGLOBIN g/dL 13 6   HEMATOCRIT % 42 5   PLATELETS Thousands/uL 204   NEUTROS PCT % 93*   LYMPHS PCT % 5*   MONOS PCT % 2*   EOS PCT % 0     Results from last 7 days   Lab Units 08/03/19  0817   SODIUM mmol/L 140   POTASSIUM mmol/L 4 1   CHLORIDE mmol/L 103   CO2 mmol/L 27   BUN mg/dL 17   CREATININE mg/dL 0 91   ANION GAP mmol/L 10   CALCIUM mg/dL 9 0   ALBUMIN g/dL 4 0   TOTAL BILIRUBIN mg/dL 1 00   ALK PHOS U/L 87   ALT U/L 23   AST U/L 19   GLUCOSE RANDOM mg/dL 134     Results from last 7 days   Lab Units 08/03/19  0817   INR  0 96             Results from last 7 days   Lab Units 08/03/19  0817   LACTIC ACID mmol/L 2 0       Imaging: I have personally reviewed pertinent reports  CT abdomen pelvis with contrast   Final Result by Aydee Mary MD (08/03 0878)      Left-sided colitis with no bowel obstruction or pneumatosis intestinalis  Workstation performed: [id number]             EKG, Pathology, and Other Studies Reviewed on Admission:   · EKG: no EKG on chart       Allscripts / Epic Records Reviewed: Yes     ** Please Note: This note has been constructed using a voice recognition system   **

## 2019-08-04 ENCOUNTER — TELEPHONE (OUTPATIENT)
Dept: OTHER | Facility: OTHER | Age: 79
End: 2019-08-04

## 2019-08-04 PROBLEM — E87.6 HYPOKALEMIA: Status: ACTIVE | Noted: 2019-08-04

## 2019-08-04 LAB
ALBUMIN SERPL BCP-MCNC: 3.1 G/DL (ref 3.5–5)
ALP SERPL-CCNC: 75 U/L (ref 46–116)
ALT SERPL W P-5'-P-CCNC: 19 U/L (ref 12–78)
ANION GAP SERPL CALCULATED.3IONS-SCNC: 8 MMOL/L (ref 4–13)
AST SERPL W P-5'-P-CCNC: 17 U/L (ref 5–45)
BILIRUB SERPL-MCNC: 1.4 MG/DL (ref 0.2–1)
BUN SERPL-MCNC: 9 MG/DL (ref 5–25)
CALCIUM SERPL-MCNC: 8.7 MG/DL (ref 8.3–10.1)
CHLORIDE SERPL-SCNC: 106 MMOL/L (ref 100–108)
CO2 SERPL-SCNC: 28 MMOL/L (ref 21–32)
CREAT SERPL-MCNC: 0.93 MG/DL (ref 0.6–1.3)
ERYTHROCYTE [DISTWIDTH] IN BLOOD BY AUTOMATED COUNT: 13.2 % (ref 11.6–15.1)
GFR SERPL CREATININE-BSD FRML MDRD: 59 ML/MIN/1.73SQ M
GLUCOSE SERPL-MCNC: 112 MG/DL (ref 65–140)
HCT VFR BLD AUTO: 38.2 % (ref 34.8–46.1)
HCT VFR BLD AUTO: 38.7 % (ref 34.8–46.1)
HCT VFR BLD AUTO: 39.9 % (ref 34.8–46.1)
HCT VFR BLD AUTO: 40.1 % (ref 34.8–46.1)
HGB BLD-MCNC: 12.2 G/DL (ref 11.5–15.4)
HGB BLD-MCNC: 12.7 G/DL (ref 11.5–15.4)
HGB BLD-MCNC: 12.9 G/DL (ref 11.5–15.4)
HGB BLD-MCNC: 12.9 G/DL (ref 11.5–15.4)
MCH RBC QN AUTO: 30.6 PG (ref 26.8–34.3)
MCHC RBC AUTO-ENTMCNC: 32.3 G/DL (ref 31.4–37.4)
MCV RBC AUTO: 95 FL (ref 82–98)
PLATELET # BLD AUTO: 167 THOUSANDS/UL (ref 149–390)
PMV BLD AUTO: 9.9 FL (ref 8.9–12.7)
POTASSIUM SERPL-SCNC: 3.3 MMOL/L (ref 3.5–5.3)
PROT SERPL-MCNC: 6.2 G/DL (ref 6.4–8.2)
RBC # BLD AUTO: 4.21 MILLION/UL (ref 3.81–5.12)
SODIUM SERPL-SCNC: 142 MMOL/L (ref 136–145)
WBC # BLD AUTO: 9.13 THOUSAND/UL (ref 4.31–10.16)

## 2019-08-04 PROCEDURE — 85014 HEMATOCRIT: CPT | Performed by: PHYSICIAN ASSISTANT

## 2019-08-04 PROCEDURE — 85027 COMPLETE CBC AUTOMATED: CPT | Performed by: INTERNAL MEDICINE

## 2019-08-04 PROCEDURE — 80053 COMPREHEN METABOLIC PANEL: CPT | Performed by: INTERNAL MEDICINE

## 2019-08-04 PROCEDURE — 85018 HEMOGLOBIN: CPT | Performed by: PHYSICIAN ASSISTANT

## 2019-08-04 PROCEDURE — 85014 HEMATOCRIT: CPT | Performed by: INTERNAL MEDICINE

## 2019-08-04 PROCEDURE — 85018 HEMOGLOBIN: CPT | Performed by: INTERNAL MEDICINE

## 2019-08-04 PROCEDURE — 99232 SBSQ HOSP IP/OBS MODERATE 35: CPT | Performed by: PHYSICIAN ASSISTANT

## 2019-08-04 PROCEDURE — 99222 1ST HOSP IP/OBS MODERATE 55: CPT | Performed by: NURSE PRACTITIONER

## 2019-08-04 PROCEDURE — 99223 1ST HOSP IP/OBS HIGH 75: CPT | Performed by: INTERNAL MEDICINE

## 2019-08-04 RX ORDER — POTASSIUM CHLORIDE 20 MEQ/1
40 TABLET, EXTENDED RELEASE ORAL ONCE
Status: COMPLETED | OUTPATIENT
Start: 2019-08-04 | End: 2019-08-04

## 2019-08-04 RX ORDER — CIPROFLOXACIN 500 MG/1
500 TABLET, FILM COATED ORAL EVERY 12 HOURS SCHEDULED
Status: DISCONTINUED | OUTPATIENT
Start: 2019-08-05 | End: 2019-08-05

## 2019-08-04 RX ORDER — METRONIDAZOLE 500 MG/1
500 TABLET ORAL EVERY 8 HOURS
Status: DISCONTINUED | OUTPATIENT
Start: 2019-08-05 | End: 2019-08-05

## 2019-08-04 RX ADMIN — ATORVASTATIN CALCIUM 40 MG: 40 TABLET, FILM COATED ORAL at 09:10

## 2019-08-04 RX ADMIN — RANOLAZINE 500 MG: 500 TABLET, FILM COATED, EXTENDED RELEASE ORAL at 21:27

## 2019-08-04 RX ADMIN — METRONIDAZOLE 500 MG: 500 INJECTION, SOLUTION INTRAVENOUS at 17:07

## 2019-08-04 RX ADMIN — CIPROFLOXACIN 400 MG: 2 INJECTION, SOLUTION INTRAVENOUS at 21:27

## 2019-08-04 RX ADMIN — RANOLAZINE 500 MG: 500 TABLET, FILM COATED, EXTENDED RELEASE ORAL at 09:11

## 2019-08-04 RX ADMIN — METRONIDAZOLE 500 MG: 500 INJECTION, SOLUTION INTRAVENOUS at 02:13

## 2019-08-04 RX ADMIN — LOSARTAN POTASSIUM 50 MG: 50 TABLET, FILM COATED ORAL at 09:13

## 2019-08-04 RX ADMIN — POTASSIUM CHLORIDE 40 MEQ: 1500 TABLET, EXTENDED RELEASE ORAL at 11:54

## 2019-08-04 RX ADMIN — ASPIRIN 81 MG: 81 TABLET, COATED ORAL at 09:11

## 2019-08-04 RX ADMIN — METOPROLOL SUCCINATE 25 MG: 25 TABLET, EXTENDED RELEASE ORAL at 09:12

## 2019-08-04 RX ADMIN — ISOSORBIDE MONONITRATE 60 MG: 60 TABLET, EXTENDED RELEASE ORAL at 09:11

## 2019-08-04 RX ADMIN — TORSEMIDE 20 MG: 20 TABLET ORAL at 09:10

## 2019-08-04 RX ADMIN — CIPROFLOXACIN 400 MG: 2 INJECTION, SOLUTION INTRAVENOUS at 09:08

## 2019-08-04 RX ADMIN — METRONIDAZOLE 500 MG: 500 INJECTION, SOLUTION INTRAVENOUS at 11:00

## 2019-08-04 RX ADMIN — VITAMIN D, TAB 1000IU (100/BT) 2000 UNITS: 25 TAB at 09:13

## 2019-08-04 RX ADMIN — SODIUM CHLORIDE 50 ML/HR: 0.9 INJECTION, SOLUTION INTRAVENOUS at 17:07

## 2019-08-04 NOTE — ASSESSMENT & PLAN NOTE
· Highly suspicious for ischemic given history of celiac, SMA, and JETHRO stenoses   GI consult noted   · Vascular surgery consulted for further recommendations   · Continue antibiotics pending stools studies to rule out infections  · Monitor for signs of sepsis/hemodynamic instability   · Appreciate GI recommendations

## 2019-08-04 NOTE — ASSESSMENT & PLAN NOTE
· POA, mild, improved   Likely secondary to colitis   · Continue antibiotics  · Monitor blood cultures

## 2019-08-04 NOTE — ASSESSMENT & PLAN NOTE
68yo female with PMH HTN, HLD, CKD, CAD, SUKH, PAD presents to [facility] [place] [facility] with a bout of bloody loose stool overnight (5-10x's)  She has a previous history of ischemic colitis  Vascular surgery consult to did for aid in management of the patient's ischemic colitis  08/01/2019 renal artery ultrasound 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, 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% with velocity of 370, celiac axis is patent  08/03/2019 CT scan with contrast noted for edematous wall thickening of the descending colon and pericolonic infiltration consistent with left-sided colitis; scan was reviewed by Dr Jeanice Hatchet; SMA patent, celiac with distal stenosis noted and collateral vessels      Recommendations:  -upon our review of CT scan and renal artery ultrasound and 2016 angiogram, patient has patent mesenteric arteries with no signs of acute mesenteric ischemia    No need for urgent vascular intervention   -continue with GI workup and recommendations including clear diet  -continue with aspirin and Lipitor  -continue antibiotics per primary team and GI; r/o infectious source  -will sign off at this time, if any acute changes please call  -patient scheduled for outpatient follow up with vascular surgery on 08/08/2019, continue with appointment

## 2019-08-04 NOTE — CONSULTS
Consultation -  [facility]  Anais Aranda 77 y o  female MRN: [id number]  Unit/Bed#: -01 Encounter: [id number]        Consults    Reason for Consult / Principal Problem: hemorrhagic descending colitis, history ischemic colitis, stenosed SMA, JETHRO, and celiac arteries    ASSESSMENT and PLAN:    Active Problems:    HTN (hypertension)    Colitis    SIRS (systemic inflammatory response syndrome) ([facility] [address] )    #1  Hemorrhagic descending colitis with history of ischemic colitis and mesenteric artery stenosis: highly suspect recurrent ischemic colitis  Reports last week her BP was low at [facility] [place] office with SBP of 104  Also had an episode of dizziness  BP high here in patient  In 2015 had a mesenteric duplex showing >70% stenosis of celiac, SMA, and JETHRO arteries  Then had an angiography with IR in 2015 which showed patency of SMA but a nonpatent JETHRO and no collateral pathway from the SMA to JETHRO vascular territories   -Discussed concern for ischemic colitis with patient  Fortunately she is currently clinically stable  However, I discussed the risks of worsening ischemia in the future that could lead to sepsis and emergency surgery  Due to her underlying heart condition there has been concern for surgery for her vascular issues  -Will consult vascular surgery  Would benefit from risk benefit discussion in regards to vascular intervention  -Defer further imaging such as angiography (kidney function normal) versus mesenteric duplex to them      -Continue clear liquid diet  -Rule out infection, stool studies pending  -Monitor stool output and hgb which is fortunately stable currently  -Continue abx for now    -------------------------------------------------------------------------------------------------------------------    HPI:  This is a 59-year-old female with a history of irritable vaginal prolapse with pessary, Sjogren's disease, Raynaud's disease, osteoarthritis, hypertension, history of CABG and stent placement, history of ischemic colitis, and history of mesenteric and renal artery stenosis who presented to the emergency room due to bloody bowel movements  The patient reports that she has issues with her bowel movements constantly  She alternates between diarrhea and constipation and cannot control her bowels  This is been an ongoing problem for many years  She reports that as of yesterday she would be at that passed bright red blood per rectum  This prompted her to come to the emergency room  Her last bowel movement was at 5:00 p m  Yesterday which was all blood  She has not moved her bowels since that time  She currently has some mild discomfort in her left lower quadrant which she reports is chronically there  She denies any nausea or vomiting  She denies any fevers or chills  She was not on any recent antibiotics was not recently hospitalized  She recently underwent vascular studies with her vascular doctor to assess her carotids, lower extremities, and renal arteries but did not have any imaging on her abdominal arteries  She has a history of episodes of ischemic colitis  She had a history of 2016 followed by an EGD and colonoscopy  She then had another episode in 2017  She reports she has not had any episodes since that time  In 2015 she had a mesenteric duplex which showed greater than 70% stenosis of the celiac, JETHRO, and SMA arteries  She then subsequently had an IR angiography this same year which showed a non patent JETHRO with no collateral vessels  She reports a good appetite and denies any weight loss  REVIEW OF SYSTEMS:    CONSTITUTIONAL: Denies any fever, chills, or rigors  Good appetite, and no recent weight loss  HEENT: No earache or tinnitus  Denies hearing loss or visual disturbances  CARDIOVASCULAR: No chest pain or palpitations  RESPIRATORY: Denies any cough, hemoptysis, shortness of breath or dyspnea on exertion    GASTROINTESTINAL: As noted in the History of Present Illness  GENITOURINARY: No problems with urination  Denies any hematuria or dysuria  NEUROLOGIC: No dizziness or vertigo, denies headaches  MUSCULOSKELETAL: Denies any muscle or joint pain  SKIN: Denies skin rashes or itching  ENDOCRINE: Denies excessive thirst  Denies intolerance to heat or cold  PSYCHOSOCIAL: Denies depression or anxiety  Denies any recent memory loss  Historical Information   Past Medical History:   Diagnosis Date    Anal fissure     Cardiac disorder     Esophageal reflux     Esophagitis, reflux     Hemorrhoids     Hepatic hemangioma     Last Assessed: 1/13/2015    Herpes zoster     History of colonic polyps     Hypertension     Ischem[facility] colitis ([facility] [address] [facility]     Lumbar herniated disc     [facility]     Nephrolithiasis     L  Lithotripsy    Nontoxic single thyroid nodule     Last Assessed: 1/13/2015    Osteoarthritis     Overactive bladder     Raynaud disease     Respiratory system disease     [facility]ren's [facility]     Spinal stenosis     PONCHO (stress urinary incontinence, female)     Uterovaginal prolapse     Grade I-II     Past Surgical History:   Procedure Laterality Date    APPENDECTOMY  1947    CARDIAC SURGERY      CABG    CATARACT EXTRACTION Bilateral     COLONOSCOPY  2012    Fiberoptic    COLONOSCOPY      Resolved: 2006 - 2012 5 year f/u    CORONARY ANGIOPLASTY WITH STENT PLACEMENT      CORONARY ARTERY BYPASS GRAFT      Resolved: 2012    ESOPHAGOGASTRODUODENOSCOPY  2012    Diagnostic    HEMORROIDECTOMY      KNEE SURGERY      LITHOTRIPSY      Renal    MALIGNANT SKIN LESION EXCISION      Face; Resolved: 2004    RI ESOPHAGOGASTRODUODENOSCOPY TRANSORAL DIAGNOSTIC N/A 4/13/2016    Procedure: EGD AND COLONOSCOPY;  Surgeon: Jack Herring MD;  [facility];   Service: Gastroenterology    RENAL ARTERY STENT      SKIN LESION EXCISION      Scalp    SOFT TISSUE TUMOR RESECTION      Shoulder; Resolved: 1995    THROMBOLYSIS      Postoperative Thrombolysis PTCA    TONSILLECTOMY      Resolved: 1944     Social History   Social History     Substance and Sexual Activity   Alcohol Use Yes    Comment: social     Social History     Substance and Sexual Activity   Drug Use No     Social History     Tobacco Use   Smoking Status Never Smoker   Smokeless Tobacco Never Used     Family History   Problem Relation Age of Onset    Heart disease Mother     Hypertension Mother     Osteoporosis Mother     Heart disease Father     Hypertension Father     Ulcerative colitis Family     Colon polyps Family        Meds/Allergies     Medications Prior to Admission   Medication    aspirin (ECOTRIN LOW STRENGTH) 81 mg EC tablet    atorvastatin (LIPITOR) 40 mg tablet    Cholecalciferol (VITAMIN D3 PO)    Docusate Sodium (COLACE PO)    EFFIENT tablet    isosorbide mononitrate (IMDUR) 60 mg 24 hr tablet    losartan (COZAAR) 50 mg tablet    metoprolol succinate (TOPROL-XL) 25 mg 24 hr tablet    multivitamin (THERAGRAN) TABS    RANEXA 500 MG 12 hr tablet    torsemide (DEMADEX) 20 mg tablet    clopidogrel (PLAVIX) 75 mg tablet    nitroglycerin (NITROSTAT) 0 4 mg SL tablet     Current Facility-Administered Medications   Medication Dose Route Frequency    aspirin (ECOTRIN LOW STRENGTH) EC tablet 81 mg  81 mg Oral Daily    atorvastatin (LIPITOR) tablet 40 mg  40 mg Oral Daily    cholecalciferol (VITAMIN D3) tablet 2,000 Units  2,000 Units Oral Daily    ciprofloxacin (CIPRO) IVPB (premix) 400 mg  400 mg Intravenous Q12H    dicyclomine (BENTYL) capsule 10 mg  10 mg Oral 4x Daily PRN    isosorbide mononitrate (IMDUR) 24 hr tablet 60 mg  60 mg Oral Daily    losartan (COZAAR) tablet 50 mg  50 mg Oral Daily    metoprolol succinate (TOPROL-XL) 24 hr tablet 25 mg  25 mg Oral Daily    metroNIDAZOLE (FLAGYL) IVPB (premix) 500 mg  500 mg Intravenous Q8H    nitroglycerin (NITROSTAT) SL tablet 0 4 mg  0 4 mg Sublingual Q5 Min PRN    ranolazine (RANEXA) 12 hr tablet 500 mg  500 mg Oral Q12H SHELLEY    sodium chloride 0 9 % infusion  50 mL/hr Intravenous Continuous    torsemide (DEMADEX) tablet 20 mg  20 mg Oral Daily       Allergies   Allergen Reactions    Sulfa Antibiotics Anaphylaxis    Formaldehyde     Codeine Palpitations           Objective     Blood pressure 145/65, pulse 72, temperature 98 2 °F (36 8 °C), temperature source Oral, resp  rate 18, height 4' 10" (1 473 m), weight 58 5 kg (129 lb), SpO2 96 %, not currently breastfeeding  Intake/Output Summary (Last 24 hours) at 8/4/2019 0912  Last data filed at 8/4/2019 0600  Gross per 24 hour   Intake 1898 ml   Output 1550 ml   Net 348 ml         PHYSICAL EXAM:      General Appearance:   Alert, cooperative, no distress, appears stated age    HEENT:   Normocephalic, atraumatic, anicteric, no oropharyngeal thrush present      Neck:  Supple, symmetrical, trachea midline, no adenopathy;    thyroid: no enlargement/tenderness/nodules; no carotid  bruit or JVD    Lungs:   Clear to auscultation bilaterally; no rales, rhonchi or wheezing; respirations unlabored    Heart[de-identified]   S1 and S2 normal; regular rate and rhythm; no murmur, rub, or gallop     Abdomen:   Soft, LLQ pain to palpation,  non-distended; normal bowel sounds; no masses, no organomegaly    Genitalia:   Deferred    Rectal:   Deferred    Extremities:  No cyanosis, clubbing or edema    Pulses:  2+ and symmetric all extremities    Skin:  Skin color, texture, turgor normal, no rashes or lesions    Lymph nodes:  No palpable cervical, axillary or inguinal lymphadenopathy        Lab Results:   Results from last 7 days   Lab Units 08/04/19  0619  08/03/19  0817   WBC Thousand/uL 9 13  --  11 86*   HEMOGLOBIN g/dL 12 9   < > 13 6   HEMATOCRIT % 39 9   < > 42 5   PLATELETS Thousands/uL 167  --  204   NEUTROS PCT %  --   --  93*   LYMPHS PCT %  --   --  5*   MONOS PCT %  --   --  2*   EOS PCT %  --   --  0    < > = values in this interval not displayed  Results from last 7 days   Lab Units 08/04/19  0619   POTASSIUM mmol/L 3 3*   CHLORIDE mmol/L 106   CO2 mmol/L 28   BUN mg/dL 9   CREATININE mg/dL 0 93   CALCIUM mg/dL 8 7   ALK PHOS U/L 75   ALT U/L 19   AST U/L 17     Results from last 7 days   Lab Units 08/03/19  0817   INR  0 96     Results from last 7 days   Lab Units 08/03/19  0817   LIPASE u/L 88       Imaging Studies: I have personally reviewed pertinent imaging studies  Ct Abdomen Pelvis With Contrast    Result Date: 8/3/2019  Impression: Left-sided colitis with no bowel obstruction or pneumatosis intestinalis  Workstation performed: [id number]           Patient was seen and examined by Dr Marcie Montelongo  All goins medical decisions were made by Dr Marcie Montelongo  Thank you for allowing us to participate in the care of this present patient  We will follow-up with you closely

## 2019-08-04 NOTE — UTILIZATION REVIEW
Initial Clinical Review    Admission: Date/Time/Statement: 8/3/19 @ 0928     Orders Placed This Encounter   Procedures    Inpatient Admission (expected length of stay for this patient Order details is greater than two midnights)     Standing Status:   Standing     Number of Occurrences:   1     Order Specific Question:   Admitting Physician     Answer:   Sheryle Poche     Order Specific Question:   Level of Care     Answer:   Med Surg [16]     Order Specific Question:   Estimated length of stay     Answer:   More than 2 Midnights     Order Specific Question:   Certification     Answer:   I certify that inpatient services are medically necessary for this patient for a duration of greater than two midnights  See H&P and MD Progress Notes for additional information about the patient's course of treatment  ED Arrival Information     Expected Arrival Acuity Means of Arrival Escorted By Service Admission Type    - 8/3/2019 07:25 Urgent Walk-In Family Member General Medicine Urgent    Arrival Complaint    Abd pain        Chief Complaint   Patient presents with   Dimple Chasten or Bloody Stool     Pt presents to ED from home due to c/o loose bloody stools (x5-10) during the night  Has had this occur in the past, unsure of tx  Additional complaints: generalized ABD pain, n/v for past week  Denies SOB, fatigue, dizziness   Abdominal Pain     Assessment/Plan:   77 y o  female who presents with lower abdominal pain associated with loose bloody stools approximately 5-10 overnight and through the course of stay yesterday  SIRS (systemic inflammatory response syndrome) (HCC)  Assessment & Plan  WCC is 11 98 and patient is tachycardic  In setting of colitis  We are treating for infection for now  Would appreciate GI input      Colitis  Assessment & Plan  Could be ischemic given history  Will consult GI  Will trend H&H as patient is having bloody bowel movement  Will check cultures including C-diff     Lactic acid normal   Will give IVF  Clear liquid diet       HTN (hypertension)  Assessment & Plan  BP is 151/66   Will continue with home meds    ED Triage Vitals   Temperature Pulse Respirations Blood Pressure SpO2   08/03/19 0734 08/03/19 0734 08/03/19 0734 08/03/19 0734 08/03/19 0734   98 °F (36 7 °C) 91 16 129/58 97 %      Temp Source Heart Rate Source Patient Position - Orthostatic VS BP Location FiO2 (%)   08/03/19 0734 08/03/19 0734 08/03/19 0734 08/03/19 0734 --   Oral Monitor Sitting Right arm       Pain Score       08/03/19 0821       5        Wt Readings from Last 1 Encounters:   08/04/19 58 5 kg (129 lb)     Additional Vital Signs:   08/03/19 1123              None (Room air)     08/03/19 1046  97 9 °F (36 6 °C)  92  18  180/80Abnormal    116  96 %  None (Room air)  Lying   BP: RN aware   pt didn't take any of her BP meds this morning yet at 08/03/19 1046   08/03/19 0851    86  18  175/72Abnormal     95 %       08/03/19 0734  98 °F (36 7 °C)  91  16  129/58    97 %  None (Room air)  Sitting       Pertinent Labs/Diagnostic Test Results:   Results from last 7 days   Lab Units 08/04/19  1226 08/04/19  0619 08/04/19  0010 08/03/19  1813 08/03/19  0817   WBC Thousand/uL  --  9 13  --   --  11 86*   HEMOGLOBIN g/dL 12 9 12 9 12 2 12 9 13 6   HEMATOCRIT % 40 1 39 9 38 2 39 7 42 5   PLATELETS Thousands/uL  --  167  --   --  204   NEUTROS ABS Thousands/µL  --   --   --   --  10 87*     Results from last 7 days   Lab Units 08/04/19  0619 08/03/19  0817   SODIUM mmol/L 142 140   POTASSIUM mmol/L 3 3* 4 1   CHLORIDE mmol/L 106 103   CO2 mmol/L 28 27   ANION GAP mmol/L 8 10   BUN mg/dL 9 17   CREATININE mg/dL 0 93 0 91   EGFR ml/min/1 73sq m 59 60   CALCIUM mg/dL 8 7 9 0     Results from last 7 days   Lab Units 08/04/19  0619 08/03/19  0817   AST U/L 17 19   ALT U/L 19 23   ALK PHOS U/L 75 87   TOTAL PROTEIN g/dL 6 2* 7 5   ALBUMIN g/dL 3 1* 4 0   TOTAL BILIRUBIN mg/dL 1 40* 1 00     Results from last 7 days Lab Units 08/04/19  0619 08/03/19  0817   GLUCOSE RANDOM mg/dL 112 134     Results from last 7 days   Lab Units 08/03/19  0817   PROTIME seconds 12 2   INR  0 96   PTT seconds 24     Results from last 7 days   Lab Units 08/03/19  0817   LACTIC ACID mmol/L 2 0     Results from last 7 days   Lab Units 08/03/19  0817   LIPASE u/L 88     Results from last 7 days   Lab Units 08/03/19  1016 08/03/19  1003   BLOOD CULTURE  No Growth at 24 hrs  No Growth at 24 hrs  CT A/P=Left-sided colitis with no bowel obstruction or pneumatosis intestinalis  ED Treatment:   Medication Administration from 08/03/2019 0725 to 08/03/2019 1040       Date/Time Order Dose Route     08/03/2019 0815 sodium chloride 0 9 % bolus 1,000 mL 1,000 mL Intravenous     08/03/2019 0817 ondansetron (ZOFRAN) injection 4 mg 4 mg Intravenous     08/03/2019 9383 morphine injection 2 mg 2 mg Intravenous     08/03/2019 0857 iohexol (OMNIPAQUE) 350 MG/ML injection (MULTI-DOSE) 100 mL 100 mL Intravenous     08/03/2019 1014 metroNIDAZOLE (FLAGYL) IVPB (premix) 500 mg 500 mg Intravenous        Past Medical History:   Diagnosis Date    Anal fissure     Cardiac disorder     Esophageal reflux     Esophagitis, reflux     Hemorrhoids     Hepatic hemangioma     Last Assessed: 1/13/2015    Herpes zoster     History of colonic polyps     Hypertension     [facility] [address] [facility] [address] [facility]     Lumbar herniated disc     Malignant neoplasm without specification of site ([facility] [address] [facility]     Nephrolithiasis     L   Lithotripsy    Nontoxic single thyroid nodule     Last Assessed: 1/13/2015    Osteoarthritis     Overactive bladder     Raynaud disease     Respiratory system disease     Sjogren's disease ([facility] [address] [facility]     Spinal stenosis     PONCHO (stress urinary incontinence, female)     Uterovaginal prolapse     Grade I-II     Present on Admission:   Colitis   SIRS (systemic inflammatory response syndrome) (HCC)   Essential hypertension   Hypokalemia    Admitting Diagnosis: Hematochezia [K92 1]  Bloody stool [K92 1]  Acute hemorrhagic colitis [K52 9]  Age/Sex: 77 y o  female  Admission Orders:  MED SURG  CONTACT ISOLATION  VENODYNES  CONSULT GI  CONSULT VASCULAR SURGERY    Current Facility-Administered Medications:  aspirin 81 mg Oral Daily   atorvastatin 40 mg Oral Daily   cholecalciferol 2,000 Units Oral Daily   ciprofloxacin 400 mg Intravenous Q12H   dicyclomine 10 mg Oral 4x Daily PRN   isosorbide mononitrate 60 mg Oral Daily   losartan 50 mg Oral Daily   metoprolol succinate 25 mg Oral Daily   metroNIDAZOLE 500 mg Intravenous Q8H   nitroglycerin 0 4 mg Sublingual Q5 Min PRN   ranolazine 500 mg Oral Q12H [address]   sodium chloride 50 mL/hr Intravenous Continuous   torsemide 20 mg Oral Daily     Network Utilization Review Department  Phone: [phone]; Fax [phone]  [email or web address]  ATTENTION: Please call with any questions or concerns to [phone]  and carefully listen to the prompts so that you are directed to the right person  Send all requests for admission clinical reviews, approved or denied determinations and any other requests to fax [phone]   All voicemails are confidential

## 2019-08-04 NOTE — APP STUDENT NOTE
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: GI saw the patient - high suspicion of recurrent ischemic colitis  Patient currently clinically stable hemoglobin 12 9  Creatinine normal  Vascular surgery consulted by GI to discuss further imaging such as angiography (kindey function normal) vs  Mesenteric duplex to them  -monitor the patient for signs of sepsis secondary to     ischemic colitis  -monitor/trend hemoglobin    -awaiting stool studies to rule-out infectious colitis  2  SIRS (systemic inflammatory response syndrome): patient WBC back to normal 9 13  Continuing IV abx until r/o infectious colitis  -continue IV abx until rule-out infectious cause  3  Hypertension: chronic condition, on home medication  Follows with PCP  /66   -continue home medication  VTE Pharmacologic Prophylaxis:   Pharmacologic: Heparin  Mechanical VTE Prophylaxis in Place: Yes    Patient Centered Rounds: I have performed bedside rounds with nursing staff today  Education and Discussions with Family / Patient: patient  at bedside  Both patient and family agreeable to plan of care  Time Spent for Care: 15 minutes  More than 50% of total time spent on counseling and coordination of care as described above  Current Length of Stay: 1 day(s)    Current Patient Status: Inpatient     Discharge Plan: awaiting vascular surgery consult  Code Status: Level 1 - Full Code      Subjective:   Patient reports that she had a bloody stool this morning about one hour ago  She denies any new abdominal pain, nausea, vomiting, lightheadedness, fever, chills, or loss of appetite       Objective:     Vitals:   Temp (24hrs), Av 5 °F (36 9 °C), Min:97 8 °F (36 6 °C), Max:99 4 °F (37 4 °C)    Temp:  [97 8 °F (36 6 °C)-99 4 °F (37 4 °C)] 98 2 °F (36 8 °C)  HR:  [72-90] 72  Resp:  [16-18] 18  BP: (142-152)/(60-78) 145/65  SpO2:  [94 %-97 %] 96 %  Body mass index is 26 96 kg/m²  Input and Output Summary (last 24 hours): Intake/Output Summary (Last 24 hours) at 8/4/2019 1142  Last data filed at 8/4/2019 1001  Gross per 24 hour   Intake 2138 ml   Output 1800 ml   Net 338 ml       Physical Exam:     Physical Exam   Constitutional: She is oriented to person, place, and time  She appears well-developed and well-nourished  HENT:   Head: Normocephalic and atraumatic  Eyes: EOM are normal    Neck: Normal range of motion  Neck supple  Cardiovascular: Normal rate and regular rhythm  No murmur heard  Pulmonary/Chest: Effort normal and breath sounds normal    Abdominal: Soft  Bowel sounds are normal  She exhibits no distension  Musculoskeletal: Normal range of motion  She exhibits no edema or deformity  Neurological: She is alert and oriented to person, place, and time  Skin: Skin is warm and dry  She is not diaphoretic  Additional Data:     Labs:    Results from last 7 days   Lab Units 08/04/19  0619  08/03/19  0817   WBC Thousand/uL 9 13  --  11 86*   HEMOGLOBIN g/dL 12 9   < > 13 6   HEMATOCRIT % 39 9   < > 42 5   PLATELETS Thousands/uL 167  --  204   NEUTROS PCT %  --   --  93*   LYMPHS PCT %  --   --  5*   MONOS PCT %  --   --  2*   EOS PCT %  --   --  0    < > = values in this interval not displayed  Results from last 7 days   Lab Units 08/04/19  0619   POTASSIUM mmol/L 3 3*   CHLORIDE mmol/L 106   CO2 mmol/L 28   BUN mg/dL 9   CREATININE mg/dL 0 93   CALCIUM mg/dL 8 7   ALK PHOS U/L 75   ALT U/L 19   AST U/L 17     Results from last 7 days   Lab Units 08/03/19  0817   INR  0 96       * I Have Reviewed All Lab Data Listed Above  * Additional Pertinent Lab Tests Reviewed:  [facility]      Recent Cultures (last 7 days):           Last 24 Hours Medication List:     Current Facility-Administered Medications:  aspirin 81 mg Oral Daily Yelitza Freeman MD    atorvastatin 40 mg Oral Daily Yelitza Freeman MD    cholecalciferol 2,000 Units Oral Daily Yelitza Freeman MD    ciprofloxacin 400 mg Intravenous Q12H Yelitza Freeman MD Last Rate: 400 mg (08/04/19 0908)   dicyclomine 10 mg Oral 4x Daily PRN Cyril Villa PA-C    isosorbide mononitrate 60 mg Oral Daily Yelitza Freeman MD    losartan 50 mg Oral Daily Yelitza Freeman MD    metoprolol succinate 25 mg Oral Daily Yelitza Freeman MD    metroNIDAZOLE 500 mg Intravenous Q8H Yelitza Freeman MD Last Rate: 500 mg (08/04/19 1100)   nitroglycerin 0 4 mg Sublingual Q5 Min PRN Yelitza Freeman MD    potassium chloride 40 mEq Oral Once Cyril Villa PA-C    ranolazine 500 mg Oral Q12H [facility] Yelitza Freeman MD    sodium chloride 50 mL/hr Intravenous Continuous Yelitza Freeman MD Last Rate: 50 mL/hr (08/03/19 1154)   torsemide 20 mg Oral Daily Yelitza Freeman MD         Today, Patient Was Seen By: Nico Harris    ** Please Note: Dictation voice to text software may have been used in the creation of this document   **

## 2019-08-04 NOTE — PLAN OF CARE
Problem: Potential for Falls  Goal: Patient will remain free of falls  Description  INTERVENTIONS:  - Assess patient frequently for physical needs  -  Identify cognitive and physical deficits and behaviors that affect risk of falls    -  [place] fall precautions as indicated by assessment   - Educate patient/family on patient safety including physical limitations  - Instruct patient to call for assistance with activity based on assessment  - Modify environment to reduce risk of injury  - Consider OT/PT consult to assist with strengthening/mobility  Outcome: Progressing     Problem: GASTROINTESTINAL - ADULT  Goal: Minimal or absence of nausea and/or vomiting  Description  INTERVENTIONS:  - Administer IV fluids as ordered to ensure adequate hydration  - Maintain NPO status until nausea and vomiting are resolved  - Nasogastric tube as ordered  - Administer ordered antiemetic medications as needed  - Provide nonpharmacologic comfort measures as appropriate  - Advance diet as tolerated, if ordered  - Nutrition services referral to assist patient with adequate nutrition and appropriate food choices  Outcome: Progressing  Goal: Maintains or returns to baseline bowel function  Description  INTERVENTIONS:  - Assess bowel function  - Encourage oral fluids to ensure adequate hydration  - Administer IV fluids as ordered to ensure adequate hydration  - Administer ordered medications as needed  - Encourage mobilization and activity  - Nutrition services referral to assist patient with appropriate food choices  Outcome: Progressing  Goal: Maintains adequate nutritional intake  Description  INTERVENTIONS:  - Monitor percentage of each meal consumed  - Identify factors contributing to decreased intake, treat as appropriate  - Assist with meals as needed  - Monitor I&O, WT and lab values  - Obtain nutrition services referral as needed  Outcome: Progressing  Goal: Establish and maintain optimal ostomy function  Description  INTERVENTIONS:  - Assess bowel function  - Encourage oral fluids to ensure adequate hydration  - Administer IV fluids as ordered to ensure adequate hydration  - Administer ordered medications as needed  - Encourage mobilization and activity  - Nutrition services referral to assist patient with appropriate food choices  - Assess stoma site  Outcome: Progressing     Problem: HEMATOLOGIC - ADULT  Goal: Maintains hematologic stability  Description  INTERVENTIONS  - Assess for signs and symptoms of bleeding or hemorrhage  - Monitor labs  - Administer supportive blood products/factors as ordered and appropriate  Outcome: Progressing

## 2019-08-04 NOTE — PHYSICIAN ADVISOR
Current patient class: Inpatient  The patient is currently on Hospital Day: 2 at [facility]      The patient was admitted to the hospital at  on 8/3/19 for the following diagnosis:  Hematochezia [K92 1]  Bloody stool [K92 1]  Acute hemorrhagic colitis [K52 9]       There is documentation in the medical record of an expected length of stay of at least 2 midnights  The patient is therefore expected to satisfy the 2 midnight benchmark and given the 2 midnight presumption is appropriate for INPATIENT ADMISSION  Given this expectation of a satisfying stay, CMS instructs us that the patient is most often appropriate for inpatient admission under part A provided medical necessity is documented in the chart  After review of the relevant documentation, labs, vital signs and test results, the patient is appropriate for INPATIENT ADMISSION  Admission to the hospital as an inpatient is a complex decision making process which requires the practitioner to consider the patients presenting complaint, history and physical examination and all relevant testing  With this in mind, in this case, the patient was deemed appropriate for INPATIENT ADMISSION  After review of the documentation and testing available at the time of the admission I concur with this clinical determination of medical necessity  77year old woman admitted to the hospital with loose bloody stool  She has a history of celiac, SMA and JETHRO stenosis  Hemoglobin is being monitored  Gastroenterology has seen the patient and vascular surgery has been consulted  CT of the abdomen showed left sided colitis with no bowel obstruction or pneumatosis intestinalis  Since the patient will require more than 2 midnight stay for the evaluation of their condition, they are appropriate for INPATIENT status  Rationale is as follows:     The patient is a 77 yrs old Female who presented to the ED at 8/3/2019  7:31 AM with a chief complaint of Black or Bloody Stool (Pt presents to ED from home due to c/o loose bloody stools (x5-10) during the night  Has had this occur in the past, unsure of tx  Additional complaints: generalized ABD pain, n/v for past week  Denies SOB, fatigue, dizziness ) and Abdominal Pain    The patients vitals on arrival were ED Triage Vitals   Temperature Pulse Respirations Blood Pressure SpO2   08/03/19 0734 08/03/19 0734 08/03/19 0734 08/03/19 0734 08/03/19 0734   98 °F (36 7 °C) 91 16 129/58 97 %      Temp Source Heart Rate Source Patient Position - Orthostatic VS BP Location FiO2 (%)   08/03/19 0734 08/03/19 0734 08/03/19 0734 08/03/19 0734 --   Oral Monitor Sitting Right arm       Pain Score       08/03/19 0821       5           Past Medical History:   Diagnosis Date    Anal fissure     Cardiac disorder     Esophageal reflux     Esophagitis, reflux     Hemorrhoids     Hepatic hemangioma     Last Assessed: 1/13/2015    Herpes zoster     History of colonic polyps     Hypertension     Ischemic colitis ([address]     Lumbar herniated disc     Malignant neoplasm without specification of site ([address] )     Nephrolithiasis     L   Lithotripsy    Nontoxic single thyroid nodule     Last Assessed: 1/13/2015    Osteoarthritis     Overactive bladder     Raynaud disease     Respiratory system disease     Sjogren's disease ([address]     Spinal stenosis     PONCHO (stress urinary incontinence, female)     Uterovaginal prolapse     Grade I-II     Past Surgical History:   Procedure Laterality Date    APPENDECTOMY  1947    CARDIAC SURGERY      CABG    CATARACT EXTRACTION Bilateral     COLONOSCOPY  2012    Fiberoptic    COLONOSCOPY      Resolved: 2006 - 2012 5 year f/u    CORONARY ANGIOPLASTY WITH STENT PLACEMENT      CORONARY ARTERY BYPASS GRAFT      Resolved: 2012    ESOPHAGOGASTRODUODENOSCOPY  2012    Diagnostic    HEMORROIDECTOMY      KNEE SURGERY      LITHOTRIPSY      Renal    MALIGNANT SKIN LESION EXCISION      Face; Resolved: 2004    UT ESOPHAGOGASTRODUODENOSCOPY TRANSORAL DIAGNOSTIC N/A 4/13/2016    Procedure: EGD AND COLONOSCOPY;  Surgeon: Yasmani Timmons MD;  [facility]; Service: Gastroenterology    RENAL ARTERY STENT      SKIN LESION EXCISION      Scalp    SOFT TISSUE TUMOR RESECTION      Shoulder; Resolved: 1995    THROMBOLYSIS      Postoperative Thrombolysis PTCA    TONSILLECTOMY      Resolved: 1944           Consults have been placed to:   IP CONSULT TO GASTROENTEROLOGY  IP CONSULT TO VASCULAR SURGERY    Vitals:    08/03/19 1501 08/03/19 2209 08/04/19 0600 08/04/19 0743   BP: 151/66 142/60  145/65   BP Location: Left arm Left arm  Left arm   Pulse: 90 82  72   Resp: 16 18  18   Temp: 97 8 °F (36 6 °C) 99 4 °F (37 4 °C)  98 2 °F (36 8 °C)   TempSrc: Oral Oral  Oral   SpO2: 97% 94%  96%   Weight:   58 5 kg (129 lb)    Height:           Most recent labs:    Recent Labs     08/03/19  0817  08/04/19  0619 08/04/19  1226   WBC 11 86*  --  9 13  --    HGB 13 6   < > 12 9 12 9   HCT 42 5   < > 39 9 40 1     --  167  --    K 4 1  --  3 3*  --    CALCIUM 9 0  --  8 7  --    BUN 17  --  9  --    CREATININE 0 91  --  0 93  --    LIPASE 88  --   --   --    INR 0 96  --   --   --    AST 19  --  17  --    ALT 23  --  19  --    ALKPHOS 87  --  75  --     < > = values in this interval not displayed         Scheduled Meds:  Current Facility-Administered Medications:  aspirin 81 mg Oral Daily Yfn Contreras MD    atorvastatin 40 mg Oral Daily Yfn Contreras MD    cholecalciferol 2,000 Units Oral Daily Yfn Contreras MD    ciprofloxacin 400 mg Intravenous Q12H Yfn Contreras MD Last Rate: 400 mg (08/04/19 0908)   dicyclomine 10 mg Oral 4x Daily PRN Cyril Galeas, PA-KATHIE    isosorbide mononitrate 60 mg Oral Daily Yfn Contreras MD    losartan 50 mg Oral Daily Yfn Contreras MD    metoprolol succinate 25 mg Oral Daily Yfn Contreras MD    metroNIDAZOLE 500 mg Intravenous Q8H Yfn Contreras MD Last Rate: 500 mg (08/04/19 1100)   nitroglycerin 0 4 mg Sublingual Q5 Min PRN Mariza Purvis MD    ranolazine 500 mg Oral Q12H [facility] Mariza Purvis MD    sodium chloride 50 mL/hr Intravenous Continuous Mariza Purvis MD Last Rate: 50 mL/hr (08/03/19 1154)   torsemide 20 mg Oral Daily Mariza Purvis MD      Continuous Infusions:  sodium chloride 50 mL/hr Last Rate: 50 mL/hr (08/03/19 1154)     PRN Meds: dicyclomine    nitroglycerin

## 2019-08-04 NOTE — CONSULTS
Consult- Marbella Hurtado 1940, 77 y o  female MRN: [id number]    Unit/Bed#: -01 Encounter: [id number]    Primary Care Provider: Juany Raya MD   Date and time admitted to hospital: 8/3/2019  7:31 AM      Inpatient consult to Vascular Surgery  Consult performed by: KATHY Jansen  Consult ordered by: Carol Hurtado PA-C          Colitis  Assessment & Plan  68yo female with PMH HTN, HLD, CKD, CAD, SUKH, PAD presents to [facility] with a bout of bloody loose stool overnight (5-10x's)  She has a previous history of ischemic colitis  Vascular surgery consult to did for aid in management of the patient's ischemic colitis  08/01/2019 renal artery ultrasound 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, 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% with velocity of 370, celiac axis is patent  08/03/2019 CT scan with contrast noted for edematous wall thickening of the descending colon and pericolonic infiltration consistent with left-sided colitis; scan was reviewed by Dr Carey Cornejo; SMA patent, celiac with distal stenosis noted and collateral vessels      Recommendations:  -upon our review of CT scan and renal artery ultrasound and 2016 angiogram, patient has patent mesenteric arteries with no signs of acute mesenteric ischemia  No need for urgent vascular intervention   -continue with GI workup and recommendations including clear diet  -continue with aspirin and Lipitor  -continue antibiotics per primary team and GI; r/o infectious source  -will sign off at this time, if any acute changes please call  -patient scheduled for outpatient follow up with vascular surgery on 08/08/2019, continue with appointment      Consult Note - Vascular Surgery     Consulting Service: SLIM    Chief Complaint: colitis    HPI: Marbella Hurtado is a 77 y o  female who presents with PMH HTN, HLD, CKD, CAD, SUKH, PAD presents to [facility] with a bout of bloody loose stool overnight (5-10x's)    She has a previous history of ischemic colitis  Vascular surgery consult to did for aid in management of the patient's ischemic colitis  Patient reports over the past several days she has had pain with eating and after eating that has been causing discomfort to the lower quadrants of her abdomen  She reports bloody stool at home that started on Friday and Saturday  Denies any sharp shooting pains  Denies loss of appetite  Denies any fever or chills  She denies any significant food fear, though she reports being apprehensive taking her medications in eating knowing that she would get a bloated feeling/discomfort to the abdomen  She has been taking her medications as ordered  She does have a history of constipation  She also reports Friday and Saturday having some episodes of vomiting  Review of Systems:  General: negative  Cardiovascular: no chest pain or dyspnea on exertion  Respiratory: no cough, shortness of breath, or wheezing  Gastrointestinal: positive for - abdominal pain, blood in stools, constipation, diarrhea and nausea/vomiting  Genitourinary ROS: no dysuria, trouble voiding, or hematuria  Musculoskeletal ROS: negative  Neurological ROS: no TIA or stroke symptoms  Hematological and Lymphatic ROS: negative  Dermatological ROS: negative  Psychological ROS: negative  Ophthalmic ROS: negative  ENT ROS: negative    Past Medical History:  Past Medical History:   Diagnosis Date    Anal fissure     Cardiac disorder     Esophageal reflux     Esophagitis, reflux     Hemorrhoids     Hepatic hemangioma     Last Assessed: 1/13/2015    Herpes zoster     History of colonic polyps     Hypertension     [facility]     Lumbar herniated disc     Malignant neoplasm without specification of site ([facility] [address] [facility]     Nephrolithiasis     L   Lithotripsy    Nontoxic single thyroid nodule     Last Assessed: 1/13/2015    Osteoarthritis     Overactive bladder     Raynaud disease     Respiratory system disease     Sjogren's disease [facility]     Spinal stenosis     PONCHO (stress urinary incontinence, female)     Uterovaginal prolapse     Grade I-II       Past Surgical History:  Past Surgical History:   Procedure Laterality Date    APPENDECTOMY  1947    CARDIAC SURGERY      CABG    CATARACT EXTRACTION Bilateral     COLONOSCOPY  2012    Fiberoptic    COLONOSCOPY      Resolved: 2006 - 2012 5 year f/u    CORONARY ANGIOPLASTY WITH STENT PLACEMENT      CORONARY ARTERY BYPASS GRAFT      Resolved: 2012    ESOPHAGOGASTRODUODENOSCOPY  2012    Diagnostic    HEMORROIDECTOMY      KNEE SURGERY      LITHOTRIPSY      Renal    MALIGNANT SKIN LESION EXCISION      Face; Resolved: 2004    DC ESOPHAGOGASTRODUODENOSCOPY TRANSORAL DIAGNOSTIC N/A 4/13/2016    Procedure: EGD AND COLONOSCOPY;  Surgeon: Angélica Mcnamara MD;  [facility]; Service: Gastroenterology    RENAL ARTERY STENT      SKIN LESION EXCISION      Scalp    SOFT TISSUE TUMOR RESECTION      Shoulder; Resolved: 1995    THROMBOLYSIS      Postoperative Thrombolysis PTCA    TONSILLECTOMY      Resolved: 1944       Social History:  Social History     Substance and Sexual Activity   Alcohol Use Yes    Comment: social     Social History     Substance and Sexual Activity   Drug Use No     Social History     Tobacco Use   Smoking Status Never Smoker   Smokeless Tobacco Never Used       Family History:  Family History   Problem Relation Age of Onset    Heart disease Mother     Hypertension Mother     Osteoporosis Mother     Heart disease Father     Hypertension Father     Ulcerative colitis Family     Colon polyps Family        Allergies:   Allergies   Allergen Reactions    Sulfa Antibiotics Anaphylaxis    Formaldehyde     Codeine Palpitations       Medications:  Current Facility-Administered Medications   Medication Dose Route Frequency    aspirin (ECOTRIN LOW STRENGTH) EC tablet 81 mg  81 mg Oral Daily    atorvastatin (LIPITOR) tablet 40 mg  40 mg Oral Daily    cholecalciferol (VITAMIN D3) tablet 2,000 Units  2,000 Units Oral Daily    ciprofloxacin (CIPRO) IVPB (premix) 400 mg  400 mg Intravenous Q12H    dicyclomine (BENTYL) capsule 10 mg  10 mg Oral 4x Daily PRN    isosorbide mononitrate (IMDUR) 24 hr tablet 60 mg  60 mg Oral Daily    losartan (COZAAR) tablet 50 mg  50 mg Oral Daily    metoprolol succinate (TOPROL-XL) 24 hr tablet 25 mg  25 mg Oral Daily    metroNIDAZOLE (FLAGYL) IVPB (premix) 500 mg  500 mg Intravenous Q8H    nitroglycerin (NITROSTAT) SL tablet 0 4 mg  0 4 mg Sublingual Q5 Min PRN    ranolazine (RANEXA) 12 hr tablet 500 mg  500 mg Oral Q12H SHE[place]LEY    sodium chloride 0 9 % infusion  50 mL/hr Intravenous Continuous    torsemide (DEMADEX) tablet 20 mg  20 mg Oral Daily       Vitals:  Vitals:    08/03/19 2209 08/04/19 0600 08/04/19 0743 08/04/19 1500   BP: 142/60  145/65 117/57   BP Location: Left arm  Left arm Left arm   Pulse: 82  72 72   Resp: 18  18 16   Temp: 99 4 °F (37 4 °C)  98 2 °F (36 8 °C) 97 8 °F (36 6 °C)   TempSrc: Oral  Oral Oral   SpO2: 94%  96% 97%   Weight:  58 5 kg (129 lb)     Height:           I/Os:  I/O last 3 completed shifts: In: 1898 [P O :900; I V :998]  Out: 1550 [NYVXA:3935]  I/O this shift:   In: 480 [P O :480]  Out: 850 [Urine:850]    Lab Results and Cultures:   CBC with diff:   Lab Results   Component Value Date    WBC 9 13 08/04/2019    HGB 12 9 08/04/2019    HCT 40 1 08/04/2019    MCV 95 08/04/2019     08/04/2019    MCH 30 6 08/04/2019    MCHC 32 3 08/04/2019    RDW 13 2 08/04/2019    MPV 9 9 08/04/2019    NRBC 0 08/03/2019   ,   BMP/CMP:  Lab Results   Component Value Date     12/24/2015    K 3 3 (L) 08/04/2019    K 3 6 12/24/2015     08/04/2019     12/24/2015    CO2 28 08/04/2019    CO2 28 0 12/24/2015    ANIONGAP 7 12/24/2015    BUN 9 08/04/2019    BUN 9 12/24/2015    CREATININE 0 93 08/04/2019    CREATININE 0 77 12/24/2015    GLUCOSE 97 12/24/2015    CALCIUM 8 7 08/04/2019 CALCIUM 8 6 2015    AST 17 2019    AST 50 (H) 2015    ALT 19 2019    ALT 34 2015    ALKPHOS 75 2019    ALKPHOS 67 2015    PROT 6 1 (L) 2015    BILITOT 0 70 2015    EGFR 59 2019   ,   Lipid Panel:   Lab Results   Component Value Date    CHOL 231 2015   ,   Coags:   Lab Results   Component Value Date    PTT 24 2019    PTT 24 2015    INR 0 96 2019    INR 0 99 2015   ,     Blood Culture:   Lab Results   Component Value Date    BLOODCX No Growth at 24 hrs  2019   ,   Urinalysis:   Lab Results   Component Value Date    COLORU Yellow 2015    CLARITYU Clear 2015    SPECGRAV 1 015 2015    PHUR 5 0 2015    LEUKOCYTESUR Negative 2015    NITRITE Negative 2015    PROTEINUA Negative 2015    GLUCOSEU Negative 2015    KETONESU Negative 2015    BILIRUBINUR Negative 2015    BLOODU Trace (A) 2015   ,   Urine Culture: No results found for: URINECX,   Wound Culure: No results found for: WOUNDCULT    Imagin/3/19 CT abd/pelvis w/contrast:  FINDINGS:     ABDOMEN     LOWER CHEST:  Small hiatal hernia noted  No other clinically significant abnormality identified in the visualized lower chest      LIVER/BILIARY TREE:  One or more subcentimeter sharply circumscribed low-density hepatic lesion(s) are noted, too small to accurately characterize, but statistically most likely to represent subcentimeter hepatic cysts  No suspicious solid hepatic   lesion is identified  Hepatic contours are normal   No biliary dilatation      GALLBLADDER:  There are gallstone(s) within the gallbladder, without pericholecystic inflammatory changes      SPLEEN:  Unremarkable      PANCREAS:  Unremarkable      ADRENAL GLANDS:  Unremarkable      KIDNEYS/URETERS:  One or more simple renal cyst(s) is noted  Otherwise unremarkable kidneys    No hydronephrosis      STOMACH AND BOWEL:  Edematous wall thickening of the descending colon with pericolonic infiltration consistent with left-sided colitis  No secondary bowel obstruction or pneumatosis intestinalis      APPENDIX:  No findings to suggest appendicitis      ABDOMINOPELVIC CAVITY:  No ascites or free intraperitoneal air  No lymphadenopathy      VESSELS:  Unremarkable for patient's age      PELVIS     REPRODUCTIVE ORGANS:  Unremarkable for patient's age  Pessary is in place      URINARY BLADDER:  Unremarkable      ABDOMINAL WALL/INGUINAL REGIONS:  Unremarkable      OSSEOUS STRUCTURES:  Postoperative changes at the L4-5 level with multilevel degenerative disc disease  No destructive lytic or blastic lesions      IMPRESSION:     Left-sided colitis with no bowel obstruction or pneumatosis intestinalis  8/1/19 Renal artery ultrasound:  CONCLUSION:  Impression  The abdominal aorta is patent and normal caliber  RIGHT RENAL:  There is a > 60% stenosis in the main proximal renal artery  Patent renal vein  Renovascular resistive index of 0 72  Renal/Aorta Ratio: 4 84  The Kidney measures 8 4 cm, Prior 8 3cm  Findings suggest hydronephrosis noted in the right kidney     LEFT RENAL:  There is a > 60% stenosis in the main proximal renal artery  Patent renal vein  Renovascular resistive index of 0 77  Renal/Aorta Ratio: 4 77   The Kidney measures 9 4 cm, Prior 9 6cm  Findings suggest hydronephrosis noted in the left kidney     MESENTERIC:  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  Celiac axis is patent        Physical Exam:    General appearance: alert and oriented, in no acute distress, cooperative and no distress  Neck: no adenopathy, no JVD, supple, symmetrical, trachea midline and thyroid not enlarged, symmetric, no tenderness/mass/nodules  Lungs: clear to auscultation bilaterally  Heart: regular rate and rhythm, S1, S2 normal, no murmur, click, rub or gallop  Abdomen: soft, non-tender; bowel sounds normal; no masses, no organomegaly  Extremities: extremities normal, warm and well-perfused; no cyanosis, clubbing, or edema  Skin: Skin color, texture, turgor normal  No rashes or lesions  Neurologic: Grossly normal        Pulse exam:  Radial: Right: 2+ Left[de-identified] 2+  DP: Right: non-palpable Left: non-palpable          KATHY Christie  8/4/2019  [facility]  [phone]

## 2019-08-04 NOTE — PLAN OF CARE
Problem: Potential for Falls  Goal: Patient will remain free of falls  Description  INTERVENTIONS:  - Assess patient frequently for physical needs  -  Identify cognitive and physical deficits and behaviors that affect risk of falls    -  [place] fall precautions as indicated by assessment   - Educate patient/family on patient safety including physical limitations  - Instruct patient to call for assistance with activity based on assessment  - Modify environment to reduce risk of injury  - Consider OT/PT consult to assist with strengthening/mobility  Outcome: Progressing     Problem: GASTROINTESTINAL - ADULT  Goal: Minimal or absence of nausea and/or vomiting  Description  INTERVENTIONS:  - Administer IV fluids as ordered to ensure adequate hydration  - Maintain NPO status until nausea and vomiting are resolved  - Nasogastric tube as ordered  - Administer ordered antiemetic medications as needed  - Provide nonpharmacologic comfort measures as appropriate  - Advance diet as tolerated, if ordered  - Nutrition services referral to assist patient with adequate nutrition and appropriate food choices  Outcome: Progressing  Goal: Maintains or returns to baseline bowel function  Description  INTERVENTIONS:  - Assess bowel function  - Encourage oral fluids to ensure adequate hydration  - Administer IV fluids as ordered to ensure adequate hydration  - Administer ordered medications as needed  - Encourage mobilization and activity  - Nutrition services referral to assist patient with appropriate food choices  Outcome: Progressing  Goal: Maintains adequate nutritional intake  Description  INTERVENTIONS:  - Monitor percentage of each meal consumed  - Identify factors contributing to decreased intake, treat as appropriate  - Assist with meals as needed  - Monitor I&O, WT and lab values  - Obtain nutrition services referral as needed  Outcome: Progressing  Goal: Establish and maintain optimal ostomy function  Description  INTERVENTIONS:  - Assess bowel function  - Encourage oral fluids to ensure adequate hydration  - Administer IV fluids as ordered to ensure adequate hydration  - Administer ordered medications as needed  - Encourage mobilization and activity  - Nutrition services referral to assist patient with appropriate food choices  - Assess stoma site  Outcome: Progressing     Problem: HEMATOLOGIC - ADULT  Goal: Maintains hematologic stability  Description  INTERVENTIONS  - Assess for signs and symptoms of bleeding or hemorrhage  - Monitor labs  - Administer supportive blood products/factors as ordered and appropriate  Outcome: Progressing

## 2019-08-04 NOTE — TELEPHONE ENCOUNTER
Date/Time Called in: 08-04-19 @5828  Type of Consult: Routine   Facility: [facility]  Unit: med surg 3  Room: 317  Phone: [phone]  Referring Physician: Dr Vinicio Martinez: Yeyo  Patient: Layne Cunningham Record Number: [id number]   Admitting Diagnosis: colitis  Reason for Consult: Acute hemorrhagic colitis and Ischemic colitis  Which Physician Notified: Dr Jarrod Flowers  Date/ Time Physician Notified:  24-57-63 @[id number]

## 2019-08-04 NOTE — PROGRESS NOTES
[facility]  Progress Note - oPoja Chin 1940, 77 y o  female MRN: [id number]    Unit/Bed#: -01 Encounter: [id number]    Primary Care Provider: Joshua Aldridge MD   Date and time admitted to hospital: 8/3/2019  7:31 AM        * SIRS (systemic inflammatory response syndrome) (HCC)  Assessment & Plan  · POA, mild, improved  Likely secondary to colitis   · Continue antibiotics  · Monitor blood cultures    Colitis  Assessment & Plan  · Highly suspicious for ischemic given history of celiac, SMA, and JETHRO stenoses  GI consult noted   · Vascular surgery consulted for further recommendations   · Continue antibiotics pending stools studies to rule out infections  · Monitor for signs of sepsis/hemodynamic instability   · Appreciate GI recommendations     Essential hypertension  Assessment & Plan  · BP controlled   · Monitor on home agents     Hypokalemia  Assessment & Plan  · Noted today at 3 3   · KCl 40 mEq PO once   · Check BMP in AM       VTE Pharmacologic Prophylaxis:   Pharmacologic: Pharmacologic VTE Prophylaxis contraindicated due to GI bleeding in the setting of suspected ischemic colitis   Mechanical VTE Prophylaxis in Place: Yes    Patient Centered Rounds: I have performed bedside rounds with nursing staff today  Discussions with Specialists or Other Care Team Provider: Discussed with RN, CM    Education and Discussions with Family / Patient: Discussed with patient,  at bedside     Time Spent for Care: 30 minutes  More than 50% of total time spent on counseling and coordination of care as described above      Current Length of Stay: 1 day(s)    Current Patient Status: Inpatient   Certification Statement: The patient will continue to require additional inpatient hospital stay due to on going work up as above     Discharge Plan: 48 hours pending further vascular/GI work up     Code Status: Level 1 - Full Code      Subjective:   Patient reports that she had a bloody bowel movement today  Reporting some LLQ pain  Denies nausea or vomiting  Denies fevers or chills  Objective:     Vitals:   Temp (24hrs), Av 5 °F (36 9 °C), Min:97 8 °F (36 6 °C), Max:99 4 °F (37 4 °C)    Temp:  [97 8 °F (36 6 °C)-99 4 °F (37 4 °C)] 98 2 °F (36 8 °C)  HR:  [72-90] 72  Resp:  [16-18] 18  BP: (142-151)/(60-66) 145/65  SpO2:  [94 %-97 %] 96 %  Body mass index is 26 96 kg/m²  Input and Output Summary (last 24 hours): Intake/Output Summary (Last 24 hours) at 2019 1200  Last data filed at 2019 1001  Gross per 24 hour   Intake 900 ml   Output 1800 ml   Net -900 ml       Physical Exam:     Physical Exam   Constitutional: She is oriented to person, place, and time  Vital signs are normal  She appears well-developed and well-nourished  Non-toxic appearance  No distress  HENT:   Head: Normocephalic and atraumatic  Mouth/Throat: Mucous membranes are not dry  Eyes: Pupils are equal, round, and reactive to light  Conjunctivae and EOM are normal  No scleral icterus  Pupils are equal    Neck: Neck supple  Cardiovascular: Normal rate, regular rhythm, S1 normal, S2 normal, normal heart sounds and intact distal pulses  Exam reveals no S3 and no S4  No murmur heard  Pulmonary/Chest: Effort normal and breath sounds normal  No accessory muscle usage or stridor  No respiratory distress  She has no decreased breath sounds  She has no wheezes  She has no rhonchi  She has no rales  She exhibits no tenderness  Abdominal: Soft  Bowel sounds are normal  She exhibits no distension and no mass  There is tenderness in the left lower quadrant  There is no rigidity, no rebound and no guarding  Neurological: She is alert and oriented to person, place, and time  She is not disoriented  GCS eye subscore is 4  GCS verbal subscore is 5  GCS motor subscore is 6  Skin: Skin is warm and dry         Additional Data:     Labs:    Results from last 7 days   Lab Units 19  0619  19  0817   WBC Thousand/uL 9 13  --  11 86*   HEMOGLOBIN g/dL 12 9   < > 13 6   HEMATOCRIT % 39 9   < > 42 5   PLATELETS Thousands/uL 167  --  204   NEUTROS PCT %  --   --  93*   LYMPHS PCT %  --   --  5*   MONOS PCT %  --   --  2*   EOS PCT %  --   --  0    < > = values in this interval not displayed  Results from last 7 days   Lab Units 08/04/19  0619   POTASSIUM mmol/L 3 3*   CHLORIDE mmol/L 106   CO2 mmol/L 28   BUN mg/dL 9   CREATININE mg/dL 0 93   CALCIUM mg/dL 8 7   ALK PHOS U/L 75   ALT U/L 19   AST U/L 17     Results from last 7 days   Lab Units 08/03/19  0817   INR  0 96       * I Have Reviewed All Lab Data Listed Above  * Additional Pertinent Lab Tests Reviewed:  [facility] Admission Reviewed    Imaging:    Imaging Reports Reviewed Today Include: None  Imaging Personally Reviewed by Myself Includes:  None    Recent Cultures (last 7 days):           Last 24 Hours Medication List:     Current Facility-Administered Medications:  aspirin 81 mg Oral Daily Buddy Dutta MD    atorvastatin 40 mg Oral Daily Buddy Dutta MD    cholecalciferol 2,000 Units Oral Daily Buddy Dutta MD    ciprofloxacin 400 mg Intravenous Q12H Buddy Dutta MD Last Rate: 400 mg (08/04/19 0908)   dicyclomine 10 mg Oral 4x Daily PRN Cyril Jon PA-C    isosorbide mononitrate 60 mg Oral Daily Buddy Dutta MD    losartan 50 mg Oral Daily Buddy Dutta MD    metoprolol succinate 25 mg Oral Daily Buddy Dutta MD    metroNIDAZOLE 500 mg Intravenous Janet Jordan MD Last Rate: 500 mg (08/04/19 1100)   nitroglycerin 0 4 mg Sublingual Q5 Min PRN Buddy Dutta MD    ranolazine 500 mg Oral Q12H [facility] Buddy Dutta MD    sodium chloride 50 mL/hr Intravenous Continuous Buddy Dutta MD Last Rate: 50 mL/hr (08/03/19 1154)   torsemide 20 mg Oral Daily Buddy Dutta MD         Today, Patient Was Seen By: Cari Del Valle PA-C    ** Please Note: Dictation voice to text software may have been used in the creation of this document   **

## 2019-08-05 PROBLEM — E87.6 HYPOKALEMIA: Status: RESOLVED | Noted: 2019-08-04 | Resolved: 2019-08-05

## 2019-08-05 LAB
ALBUMIN SERPL BCP-MCNC: 2.9 G/DL (ref 3.5–5)
ALP SERPL-CCNC: 66 U/L (ref 46–116)
ALT SERPL W P-5'-P-CCNC: 21 U/L (ref 12–78)
ANION GAP SERPL CALCULATED.3IONS-SCNC: 8 MMOL/L (ref 4–13)
AST SERPL W P-5'-P-CCNC: 20 U/L (ref 5–45)
BILIRUB SERPL-MCNC: 0.9 MG/DL (ref 0.2–1)
BUN SERPL-MCNC: 9 MG/DL (ref 5–25)
C DIFF TOX GENS STL QL NAA+PROBE: NORMAL
CALCIUM SERPL-MCNC: 8.6 MG/DL (ref 8.3–10.1)
CAMPYLOBACTER DNA SPEC NAA+PROBE: NORMAL
CHLORIDE SERPL-SCNC: 105 MMOL/L (ref 100–108)
CO2 SERPL-SCNC: 29 MMOL/L (ref 21–32)
CREAT SERPL-MCNC: 0.9 MG/DL (ref 0.6–1.3)
ERYTHROCYTE [DISTWIDTH] IN BLOOD BY AUTOMATED COUNT: 12.9 % (ref 11.6–15.1)
GFR SERPL CREATININE-BSD FRML MDRD: 61 ML/MIN/1.73SQ M
GLUCOSE SERPL-MCNC: 106 MG/DL (ref 65–140)
HCT VFR BLD AUTO: 39 % (ref 34.8–46.1)
HCT VFR BLD AUTO: 40.1 % (ref 34.8–46.1)
HGB BLD-MCNC: 12.8 G/DL (ref 11.5–15.4)
HGB BLD-MCNC: 13.1 G/DL (ref 11.5–15.4)
MCH RBC QN AUTO: 30.9 PG (ref 26.8–34.3)
MCHC RBC AUTO-ENTMCNC: 32.8 G/DL (ref 31.4–37.4)
MCV RBC AUTO: 94 FL (ref 82–98)
PLATELET # BLD AUTO: 163 THOUSANDS/UL (ref 149–390)
PMV BLD AUTO: 10.3 FL (ref 8.9–12.7)
POTASSIUM SERPL-SCNC: 3.5 MMOL/L (ref 3.5–5.3)
PROT SERPL-MCNC: 5.7 G/DL (ref 6.4–8.2)
RBC # BLD AUTO: 4.14 MILLION/UL (ref 3.81–5.12)
SALMONELLA DNA SPEC QL NAA+PROBE: NORMAL
SHIGA TOXIN STX GENE SPEC NAA+PROBE: NORMAL
SHIGELLA DNA SPEC QL NAA+PROBE: NORMAL
SODIUM SERPL-SCNC: 142 MMOL/L (ref 136–145)
WBC # BLD AUTO: 7.44 THOUSAND/UL (ref 4.31–10.16)

## 2019-08-05 PROCEDURE — 85014 HEMATOCRIT: CPT | Performed by: PHYSICIAN ASSISTANT

## 2019-08-05 PROCEDURE — 80053 COMPREHEN METABOLIC PANEL: CPT | Performed by: PHYSICIAN ASSISTANT

## 2019-08-05 PROCEDURE — 85027 COMPLETE CBC AUTOMATED: CPT | Performed by: PHYSICIAN ASSISTANT

## 2019-08-05 PROCEDURE — 99232 SBSQ HOSP IP/OBS MODERATE 35: CPT | Performed by: PHYSICIAN ASSISTANT

## 2019-08-05 PROCEDURE — 99233 SBSQ HOSP IP/OBS HIGH 50: CPT | Performed by: INTERNAL MEDICINE

## 2019-08-05 PROCEDURE — 85018 HEMOGLOBIN: CPT | Performed by: PHYSICIAN ASSISTANT

## 2019-08-05 RX ORDER — SODIUM CHLORIDE 9 MG/ML
50 INJECTION, SOLUTION INTRAVENOUS CONTINUOUS
Status: DISCONTINUED | OUTPATIENT
Start: 2019-08-05 | End: 2019-08-06 | Stop reason: HOSPADM

## 2019-08-05 RX ORDER — METRONIDAZOLE 500 MG/1
500 TABLET ORAL EVERY 8 HOURS SCHEDULED
Status: DISCONTINUED | OUTPATIENT
Start: 2019-08-05 | End: 2019-08-06 | Stop reason: HOSPADM

## 2019-08-05 RX ORDER — ONDANSETRON 2 MG/ML
4 INJECTION INTRAMUSCULAR; INTRAVENOUS EVERY 6 HOURS PRN
Status: DISCONTINUED | OUTPATIENT
Start: 2019-08-05 | End: 2019-08-05

## 2019-08-05 RX ORDER — CIPROFLOXACIN 500 MG/1
500 TABLET, FILM COATED ORAL EVERY 12 HOURS SCHEDULED
Status: DISCONTINUED | OUTPATIENT
Start: 2019-08-05 | End: 2019-08-06 | Stop reason: HOSPADM

## 2019-08-05 RX ORDER — ONDANSETRON 4 MG/1
4 TABLET, ORALLY DISINTEGRATING ORAL EVERY 6 HOURS PRN
Status: DISCONTINUED | OUTPATIENT
Start: 2019-08-05 | End: 2019-08-06 | Stop reason: HOSPADM

## 2019-08-05 RX ADMIN — SODIUM CHLORIDE 50 ML/HR: 0.9 INJECTION, SOLUTION INTRAVENOUS at 17:04

## 2019-08-05 RX ADMIN — ISOSORBIDE MONONITRATE 60 MG: 60 TABLET, EXTENDED RELEASE ORAL at 09:13

## 2019-08-05 RX ADMIN — CIPROFLOXACIN HYDROCHLORIDE 500 MG: 500 TABLET, FILM COATED ORAL at 20:20

## 2019-08-05 RX ADMIN — METRONIDAZOLE 500 MG: 500 TABLET, FILM COATED ORAL at 00:27

## 2019-08-05 RX ADMIN — METRONIDAZOLE 500 MG: 500 TABLET, FILM COATED ORAL at 16:29

## 2019-08-05 RX ADMIN — LOSARTAN POTASSIUM 50 MG: 50 TABLET, FILM COATED ORAL at 09:13

## 2019-08-05 RX ADMIN — CIPROFLOXACIN HYDROCHLORIDE 500 MG: 500 TABLET, FILM COATED ORAL at 09:14

## 2019-08-05 RX ADMIN — METRONIDAZOLE 500 MG: 500 TABLET, FILM COATED ORAL at 21:01

## 2019-08-05 RX ADMIN — ASPIRIN 81 MG: 81 TABLET, COATED ORAL at 09:14

## 2019-08-05 RX ADMIN — VITAMIN D, TAB 1000IU (100/BT) 2000 UNITS: 25 TAB at 09:14

## 2019-08-05 RX ADMIN — ATORVASTATIN CALCIUM 40 MG: 40 TABLET, FILM COATED ORAL at 09:14

## 2019-08-05 RX ADMIN — METOPROLOL SUCCINATE 25 MG: 25 TABLET, EXTENDED RELEASE ORAL at 09:14

## 2019-08-05 RX ADMIN — RANOLAZINE 500 MG: 500 TABLET, FILM COATED, EXTENDED RELEASE ORAL at 20:20

## 2019-08-05 RX ADMIN — DICYCLOMINE HYDROCHLORIDE 10 MG: 10 CAPSULE ORAL at 10:36

## 2019-08-05 RX ADMIN — RANOLAZINE 500 MG: 500 TABLET, FILM COATED, EXTENDED RELEASE ORAL at 09:14

## 2019-08-05 RX ADMIN — ONDANSETRON 4 MG: 4 TABLET, ORALLY DISINTEGRATING ORAL at 12:53

## 2019-08-05 RX ADMIN — TORSEMIDE 20 MG: 20 TABLET ORAL at 09:14

## 2019-08-05 RX ADMIN — METRONIDAZOLE 500 MG: 500 TABLET, FILM COATED ORAL at 09:13

## 2019-08-05 NOTE — ASSESSMENT & PLAN NOTE
· POA, mild, improved   Likely secondary to colitis   · Monitor off antibiotics  · Blood culture negative

## 2019-08-05 NOTE — PROGRESS NOTES
I was notified by nursing that the patient is refusing her IV access at this point  She feels that she has been stuck enough tonight, and does not want her IV fluids tonight  She says she will rethink this in the morning  Review of records reveals that the patient will be discontinued on IV antibiotics if her stool studies are negative for any acute bacterial colitis  I will change her IV antibiotics to PO form  At this time and await stool cultures      Dorene Beal PA-C

## 2019-08-05 NOTE — ASSESSMENT & PLAN NOTE
· Highly suspicious for ischemic given history of celiac, SMA, and JETHRO stenoses  GI consult noted   · Vascular surgery recommending no further intervention at this time   · Stop antibiotics   · C   Diff negative   · Stool PCR and O/P Pending   · Appreciate GI recommendations   · Follow up with further recommendation as the patient reports that she noted further bleeding in her stool

## 2019-08-05 NOTE — APP STUDENT NOTE
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: GI saw the patient - high suspicion of recurrent ischemic colitis  Patient currently clinically stable hemoglobin 12 8  Creatinine normal  Vascular surgery consulted by GI to discuss further imaging such as angiography (kindey function normal) vs  Mesenteric duplex to them  Vascular recommendations as follows: no need for urgent vascular intervetion; continue GI workup and clear diet; continue aspirin and lipitor; continue abx and r/o of infectious source; follow up with vascular surgery on 08/08/2019               -monitor the patient for signs of sepsis secondary to                ischemic colitis  -monitor/trend hemoglobin               -awaiting stool studies to rule-out infectious colitis  -patient follow-up with vascular surgery as outpatient  2  SIRS (systemic inflammatory response syndrome): patient WBC back to normal 7 77  Continuing IV abx until r/o infectious colitis  Patient afebrile              -continue IV abx until rule-out infectious cause  3  Hypertension: chronic condition, on home medication  Follows with PCP  /66              -continue home medication  VTE Pharmacologic Prophylaxis:   Pharmacologic: Heparin  Mechanical VTE Prophylaxis in Place: Yes    Patient Centered Rounds: I have performed bedside rounds with nursing staff today  Discussions with Specialists or Other Care Team Provider: GI and vascular surgery consulted    Education and Discussions with Family / Patient: patient agreeable to the plan of care   not present at bedside at this time  Time Spent for Care: 20 minutes  More than 50% of total time spent on counseling and coordination of care as described above      Current Length of Stay: 2 day(s)    Current Patient Status: Inpatient   Certification Statement: The patient will continue to require additional inpatient hospital stay due to r/o infectious cause of symptoms    Discharge Plan: awaiting stool cultures and plan for outpatient follow up with vascular surgery  Code Status: Level 1 - Full Code      Subjective: The patient reports that she has not had a bloody stool since yesterday in the morning  She denies any nausea, vomiting, diarrhea, melena, SOB  She has an appetite and was eating broth with coffee when examined  Objective:     Vitals:   Temp (24hrs), Av 9 °F (36 6 °C), Min:97 8 °F (36 6 °C), Max:98 2 °F (36 8 °C)    Temp:  [97 8 °F (36 6 °C)-98 2 °F (36 8 °C)] 98 2 °F (36 8 °C)  HR:  [72-78] 76  Resp:  [16-18] 18  BP: (114-139)/(56-64) 114/56  SpO2:  [94 %-97 %] 96 %  Body mass index is 26 79 kg/m²  Input and Output Summary (last 24 hours): Intake/Output Summary (Last 24 hours) at 2019 0924  Last data filed at 2019 2346  Gross per 24 hour   Intake 720 ml   Output 1750 ml   Net -1030 ml       Physical Exam:     Physical Exam    Additional Data:     Labs:    Results from last 7 days   Lab Units 19  0636  19  0817   WBC Thousand/uL 7 44   < > 11 86*   HEMOGLOBIN g/dL 12 8   < > 13 6   HEMATOCRIT % 39 0   < > 42 5   PLATELETS Thousands/uL 163   < > 204   NEUTROS PCT %  --   --  93*   LYMPHS PCT %  --   --  5*   MONOS PCT %  --   --  2*   EOS PCT %  --   --  0    < > = values in this interval not displayed  Results from last 7 days   Lab Units 19  0636   POTASSIUM mmol/L 3 5   CHLORIDE mmol/L 105   CO2 mmol/L 29   BUN mg/dL 9   CREATININE mg/dL 0 90   CALCIUM mg/dL 8 6   ALK PHOS U/L 66   ALT U/L 21   AST U/L 20     Results from last 7 days   Lab Units 19  0817   INR  0 96       * I Have Reviewed All Lab Data Listed Above  * Additional Pertinent Lab Tests Reviewed: Jason 66 Admission Reviewed      Recent Cultures (last 7 days):     Results from last 7 days   Lab Units 19  1016 19  1003   BLOOD CULTURE  No Growth at 24 hrs   No Growth at 24 hrs         Last 24 Hours Medication List:     Current Facility-Administered Medications:  aspirin 81 mg Oral Daily Zia Alvarez MD   atorvastatin 40 mg Oral Daily Zia Alvarez MD   cholecalciferol 2,000 Units Oral Daily Zia Alvarez MD   ciprofloxacin 500 mg Oral Q12H [address] Luz Escudero PA-C   dicyclomine 10 mg Oral 4x Daily PRN Cyril Obrien PA-C   isosorbide mononitrate 60 mg Oral Daily Zia Alvarez MD   losartan 50 mg Oral Daily Zia Alvarez MD   metoprolol succinate 25 mg Oral Daily Zia Alvarez MD   metroNIDAZOLE 500 mg Oral Q8H Luz Escudero PA-C   nitroglycerin 0 4 mg Sublingual Q5 Min PRN Zia Alvarez MD   ranolazine 500 mg Oral Q12H [address] Zia Alvarez MD   torsemide 20 mg Oral Daily Zia Alvarez MD        Today, Patient Was Seen By: Alyssa Buchanan

## 2019-08-05 NOTE — PROGRESS NOTES
[facility]  Progress Note - Lesley Navarro 1940, 77 y o  female MRN: [id number]    Unit/Bed#: -01 Encounter: [id number]    Primary Care Provider: Mary Horne MD   Date and time admitted to hospital: 8/3/2019  7:31 AM        * SIRS (systemic inflammatory response syndrome) (HCC)  Assessment & Plan  · POA, mild, improved  Likely secondary to colitis   · Monitor off antibiotics  · Blood culture negative     Colitis  Assessment & Plan  · Highly suspicious for ischemic given history of celiac, SMA, and JETHRO stenoses  GI consult noted   · Vascular surgery recommending no further intervention at this time   · Stop antibiotics   · C  Diff negative   · Stool PCR and O/P Pending   · Appreciate GI recommendations   · Follow up with further recommendation as the patient reports that she noted further bleeding in her stool     Essential hypertension  Assessment & Plan  · BP controlled   · Monitor on home agents     Hypokalemiaresolved as of 8/5/2019  Assessment & Plan  · Noted today at 3 6, resolved      VTE Pharmacologic Prophylaxis:   Pharmacologic: Pharmacologic VTE Prophylaxis contraindicated due to GIB  Mechanical VTE Prophylaxis in Place: No    Patient Centered Rounds: I have performed bedside rounds with nursing staff today  Discussions with Specialists or Other Care Team Provider: Discussed with RN, CM    Education and Discussions with Family / Patient: Discussed with patient,  at bedside     Time Spent for Care: 30 minutes  More than 50% of total time spent on counseling and coordination of care as described above      Current Length of Stay: 2 day(s)    Current Patient Status: Inpatient   Certification Statement: The patient will continue to require additional inpatient hospital stay due to on going monitoring of bleeding, pending further GI recommendations     Discharge Plan: Possibly today vs 24 hours     Code Status: Level 1 - Full Code      Subjective:   Patient reports that currently she feels that she is having some abdominal pain and nausea  Has not vomited  Was feeling well until she noted more blood in her stool today  RN reports that it was maroon in color  Denies fever, chill  Denies weakness  Wants to eat solid food  Discussed diet/possibility of old blood  Objective:     Vitals:   Temp (24hrs), Av 9 °F (36 6 °C), Min:97 8 °F (36 6 °C), Max:98 2 °F (36 8 °C)    Temp:  [97 8 °F (36 6 °C)-98 2 °F (36 8 °C)] 98 2 °F (36 8 °C)  HR:  [72-78] 76  Resp:  [16-18] 18  BP: (114-139)/(56-64) 114/56  SpO2:  [94 %-97 %] 96 %  Body mass index is 26 79 kg/m²  Input and Output Summary (last 24 hours): Intake/Output Summary (Last 24 hours) at 2019 1232  Last data filed at 2019 0800  Gross per 24 hour   Intake 1120 ml   Output 1500 ml   Net -380 ml       Physical Exam:     Physical Exam   Constitutional: She is oriented to person, place, and time  Vital signs are normal  She appears well-developed and well-nourished  Non-toxic appearance  No distress  HENT:   Head: Normocephalic and atraumatic  Mouth/Throat: Mucous membranes are not dry  Eyes: Pupils are equal, round, and reactive to light  Conjunctivae and EOM are normal  No scleral icterus  Pupils are equal    Neck: Neck supple  Cardiovascular: Normal rate, regular rhythm, S1 normal, S2 normal, normal heart sounds and intact distal pulses  Exam reveals no S3 and no S4  No murmur heard  Pulmonary/Chest: Effort normal and breath sounds normal  No accessory muscle usage or stridor  No respiratory distress  She has no decreased breath sounds  She has no wheezes  She has no rhonchi  She has no rales  She exhibits no tenderness  Abdominal: Soft  Bowel sounds are normal  She exhibits no distension and no mass  There is no tenderness  There is no rigidity, no rebound and no guarding  Neurological: She is alert and oriented to person, place, and time  She is not disoriented  GCS eye subscore is 4   GCS verbal subscore is 5  GCS motor subscore is 6  Skin: Skin is warm and dry  Additional Data:     Labs:    Results from last 7 days   Lab Units 08/05/19  0636  08/03/19  0817   WBC Thousand/uL 7 44   < > 11 86*   HEMOGLOBIN g/dL 12 8   < > 13 6   HEMATOCRIT % 39 0   < > 42 5   PLATELETS Thousands/uL 163   < > 204   NEUTROS PCT %  --   --  93*   LYMPHS PCT %  --   --  5*   MONOS PCT %  --   --  2*   EOS PCT %  --   --  0    < > = values in this interval not displayed  Results from last 7 days   Lab Units 08/05/19  0636   POTASSIUM mmol/L 3 5   CHLORIDE mmol/L 105   CO2 mmol/L 29   BUN mg/dL 9   CREATININE mg/dL 0 90   CALCIUM mg/dL 8 6   ALK PHOS U/L 66   ALT U/L 21   AST U/L 20     Results from last 7 days   Lab Units 08/03/19  0817   INR  0 96       * I Have Reviewed All Lab Data Listed Above  * Additional Pertinent Lab Tests Reviewed: Jason 66 Admission Reviewed    Imaging:    Imaging Reports Reviewed Today Include: None  Imaging Personally Reviewed by Myself Includes:  None    Recent Cultures (last 7 days):     Results from last 7 days   Lab Units 08/03/19  1616 08/03/19  1016 08/03/19  1003   BLOOD CULTURE   --  No Growth at 24 hrs  No Growth at 24 hrs     C DIFF TOXIN B  NEGATIVE for C difficle toxin by PCR    --   --        Last 24 Hours Medication List:     Current Facility-Administered Medications:  aspirin 81 mg Oral Daily Lashell Allison MD   atorvastatin 40 mg Oral Daily Lashell Allison MD   cholecalciferol 2,000 Units Oral Daily Lashell Allison MD   dicyclomine 10 mg Oral 4x Daily PRN Cyril Raygoza PA-C   isosorbide mononitrate 60 mg Oral Daily Lashell Allison MD   losartan 50 mg Oral Daily Lashell Allison MD   metoprolol succinate 25 mg Oral Daily Lashell Allison MD   nitroglycerin 0 4 mg Sublingual Q5 Min PRN Lashell Allison MD   ondansetron 4 mg Oral Q6H PRN Cyril Raygoza PA-C   ranolazine 500 mg Oral Q12H [address] Lashell Allison MD   torsemide 20 mg Oral Daily [facility] Sam Anand MD        Today, Patient Was Seen By: Nakul Jo PA-C    ** Please Note: Dictation voice to text software may have been used in the creation of this document   **

## 2019-08-05 NOTE — PROGRESS NOTES
Progress Note - Vincent Blanca 77 y o  female MRN: [id number]    Unit/Bed#: -01 Encounter: [id number]    Assessment and Plan:   Principal Problem:    SIRS (systemic inflammatory response syndrome) (HCC)  Active Problems:    Essential hypertension    Colitis    Hypokalemia    #1  Hemorrhagic descending colitis with history of ischemic colitis: highly suspect recurrent ischemic colitis  Improving clinically  No intervention warranted per vascular surgery   -advance diet to lo residue  -follow up stool studies  -d/c abx  -outpatient follow up, consider repeat colonoscopy in 4-6 weeks as outpatient to confirm healing and rule out malignancy    ----------------------------------------------------------------------------------------------------------------    Subjective:     Abdominal discomfort at baseline, no severe pain  Last BM was Saturday evening, nothing since  No N/V    Objective:     Vitals: Blood pressure 114/56, pulse 76, temperature 98 2 °F (36 8 °C), temperature source Oral, resp  rate 18, height 4' 10" (1 473 m), weight 58 2 kg (128 lb 3 2 oz), SpO2 96 %, not currently breastfeeding  ,Body mass index is 26 79 kg/m²        Intake/Output Summary (Last 24 hours) at 8/5/2019 0915  Last data filed at 8/4/2019 2346  Gross per 24 hour   Intake 720 ml   Output 1750 ml   Net -1030 ml       Physical Exam:     General Appearance: Alert, appears stated age and cooperative  Lungs: Clear to auscultation bilaterally, no rales or rhonchi, no labored breathing/accessory muscle use  Heart: Regular rate and rhythm, S1, S2 normal, no murmur, click, rub or gallop  Abdomen: Soft, mild lower abdominal pain L>R, non-distended; bowel sounds normal; no masses or no organomegaly  Extremities: No cyanosis, clubbing, or edema    Invasive Devices     None                 Lab Results:  Results from last 7 days   Lab Units 08/05/19  0636  08/03/19  0817   WBC Thousand/uL 7 44   < > 11 86*   HEMOGLOBIN g/dL 12 8   < > 13 6   HEMATOCRIT % 39 0   < > 42 5   PLATELETS Thousands/uL 163   < > 204   NEUTROS PCT %  --   --  93*   LYMPHS PCT %  --   --  5*   MONOS PCT %  --   --  2*   EOS PCT %  --   --  0    < > = values in this interval not displayed  Results from last 7 days   Lab Units 08/05/19  0636   POTASSIUM mmol/L 3 5   CHLORIDE mmol/L 105   CO2 mmol/L 29   BUN mg/dL 9   CREATININE mg/dL 0 90   CALCIUM mg/dL 8 6   ALK PHOS U/L 66   ALT U/L 21   AST U/L 20     Results from last 7 days   Lab Units 08/03/19  0817   INR  0 96     Results from last 7 days   Lab Units 08/03/19  0817   LIPASE u/L 88       Imaging Studies: I have personally reviewed pertinent imaging studies  Ct Abdomen Pelvis With Contrast    Result Date: 8/3/2019  Impression: Left-sided colitis with no bowel obstruction or pneumatosis intestinalis   Workstation performed: [id number]

## 2019-08-05 NOTE — PLAN OF CARE
Problem: Potential for Falls  Goal: Patient will remain free of falls  Description  INTERVENTIONS:  - Assess patient frequently for physical needs  -  Identify cognitive and physical deficits and behaviors that affect risk of falls    -  [place] fall precautions as indicated by assessment   - Educate patient/family on patient safety including physical limitations  - Instruct patient to call for assistance with activity based on assessment  - Modify environment to reduce risk of injury  - Consider OT/PT consult to assist with strengthening/mobility  Outcome: Progressing     Problem: GASTROINTESTINAL - ADULT  Goal: Minimal or absence of nausea and/or vomiting  Description  INTERVENTIONS:  - Administer IV fluids as ordered to ensure adequate hydration  - Maintain NPO status until nausea and vomiting are resolved  - Nasogastric tube as ordered  - Administer ordered antiemetic medications as needed  - Provide nonpharmacologic comfort measures as appropriate  - Advance diet as tolerated, if ordered  - Nutrition services referral to assist patient with adequate nutrition and appropriate food choices  Outcome: Progressing  Goal: Maintains or returns to baseline bowel function  Description  INTERVENTIONS:  - Assess bowel function  - Encourage oral fluids to ensure adequate hydration  - Administer IV fluids as ordered to ensure adequate hydration  - Administer ordered medications as needed  - Encourage mobilization and activity  - Nutrition services referral to assist patient with appropriate food choices  Outcome: Progressing  Goal: Maintains adequate nutritional intake  Description  INTERVENTIONS:  - Monitor percentage of each meal consumed  - Identify factors contributing to decreased intake, treat as appropriate  - Assist with meals as needed  - Monitor I&O, WT and lab values  - Obtain nutrition services referral as needed  Outcome: Progressing  Goal: Establish and maintain optimal ostomy function  Description  INTERVENTIONS:  - Assess bowel function  - Encourage oral fluids to ensure adequate hydration  - Administer IV fluids as ordered to ensure adequate hydration  - Administer ordered medications as needed  - Encourage mobilization and activity  - Nutrition services referral to assist patient with appropriate food choices  - Assess stoma site  Outcome: Progressing     Problem: HEMATOLOGIC - ADULT  Goal: Maintains hematologic stability  Description  INTERVENTIONS  - Assess for signs and symptoms of bleeding or hemorrhage  - Monitor labs  - Administer supportive blood products/factors as ordered and appropriate  Outcome: Progressing WDL

## 2019-08-05 NOTE — PLAN OF CARE
Problem: Potential for Falls  Goal: Patient will remain free of falls  Description  INTERVENTIONS:  - Assess patient frequently for physical needs  -  Identify cognitive and physical deficits and behaviors that affect risk of falls    -  [place] fall precautions as indicated by assessment   - Educate patient/family on patient safety including physical limitations  - Instruct patient to call for assistance with activity based on assessment  - Modify environment to reduce risk of injury  - Consider OT/PT consult to assist with strengthening/mobility  Outcome: Progressing     Problem: GASTROINTESTINAL - ADULT  Goal: Minimal or absence of nausea and/or vomiting  Description  INTERVENTIONS:  - Administer IV fluids as ordered to ensure adequate hydration  - Maintain NPO status until nausea and vomiting are resolved  - Nasogastric tube as ordered  - Administer ordered antiemetic medications as needed  - Provide nonpharmacologic comfort measures as appropriate  - Advance diet as tolerated, if ordered  - Nutrition services referral to assist patient with adequate nutrition and appropriate food choices  Outcome: Progressing  Goal: Maintains or returns to baseline bowel function  Description  INTERVENTIONS:  - Assess bowel function  - Encourage oral fluids to ensure adequate hydration  - Administer IV fluids as ordered to ensure adequate hydration  - Administer ordered medications as needed  - Encourage mobilization and activity  - Nutrition services referral to assist patient with appropriate food choices  Outcome: Progressing  Goal: Maintains adequate nutritional intake  Description  INTERVENTIONS:  - Monitor percentage of each meal consumed  - Identify factors contributing to decreased intake, treat as appropriate  - Assist with meals as needed  - Monitor I&O, WT and lab values  - Obtain nutrition services referral as needed  Outcome: Progressing  Goal: Establish and maintain optimal ostomy function  Description  INTERVENTIONS:  - Assess bowel function  - Encourage oral fluids to ensure adequate hydration  - Administer IV fluids as ordered to ensure adequate hydration  - Administer ordered medications as needed  - Encourage mobilization and activity  - Nutrition services referral to assist patient with appropriate food choices  - Assess stoma site  Outcome: Progressing     Problem: HEMATOLOGIC - ADULT  Goal: Maintains hematologic stability  Description  INTERVENTIONS  - Assess for signs and symptoms of bleeding or hemorrhage  - Monitor labs  - Administer supportive blood products/factors as ordered and appropriate  Outcome: Progressing

## 2019-08-06 VITALS
OXYGEN SATURATION: 95 % | HEART RATE: 75 BPM | WEIGHT: 127.65 LBS | RESPIRATION RATE: 18 BRPM | DIASTOLIC BLOOD PRESSURE: 60 MMHG | BODY MASS INDEX: 26.79 KG/M2 | SYSTOLIC BLOOD PRESSURE: 98 MMHG | HEIGHT: 58 IN | TEMPERATURE: 98.1 F

## 2019-08-06 PROBLEM — R65.10 SIRS (SYSTEMIC INFLAMMATORY RESPONSE SYNDROME) (HCC): Status: RESOLVED | Noted: 2019-08-03 | Resolved: 2019-08-06

## 2019-08-06 LAB
ERYTHROCYTE [DISTWIDTH] IN BLOOD BY AUTOMATED COUNT: 13 % (ref 11.6–15.1)
HCT VFR BLD AUTO: 38.9 % (ref 34.8–46.1)
HGB BLD-MCNC: 12.4 G/DL (ref 11.5–15.4)
MCH RBC QN AUTO: 30.1 PG (ref 26.8–34.3)
MCHC RBC AUTO-ENTMCNC: 31.9 G/DL (ref 31.4–37.4)
MCV RBC AUTO: 94 FL (ref 82–98)
PLATELET # BLD AUTO: 170 THOUSANDS/UL (ref 149–390)
PMV BLD AUTO: 10 FL (ref 8.9–12.7)
RBC # BLD AUTO: 4.12 MILLION/UL (ref 3.81–5.12)
WBC # BLD AUTO: 7.63 THOUSAND/UL (ref 4.31–10.16)

## 2019-08-06 PROCEDURE — 85027 COMPLETE CBC AUTOMATED: CPT | Performed by: PHYSICIAN ASSISTANT

## 2019-08-06 PROCEDURE — 97530 THERAPEUTIC ACTIVITIES: CPT

## 2019-08-06 PROCEDURE — 97163 PT EVAL HIGH COMPLEX 45 MIN: CPT

## 2019-08-06 PROCEDURE — G8979 MOBILITY GOAL STATUS: HCPCS

## 2019-08-06 PROCEDURE — 99239 HOSP IP/OBS DSCHRG MGMT >30: CPT | Performed by: INTERNAL MEDICINE

## 2019-08-06 PROCEDURE — G8978 MOBILITY CURRENT STATUS: HCPCS

## 2019-08-06 PROCEDURE — 99232 SBSQ HOSP IP/OBS MODERATE 35: CPT | Performed by: PHYSICIAN ASSISTANT

## 2019-08-06 RX ORDER — LOSARTAN POTASSIUM 25 MG/1
50 TABLET ORAL DAILY
Qty: 30 TABLET | Refills: 0 | Status: SHIPPED | OUTPATIENT
Start: 2019-08-06 | End: 2019-11-25 | Stop reason: SDUPTHER

## 2019-08-06 RX ORDER — METRONIDAZOLE 500 MG/1
500 TABLET ORAL EVERY 8 HOURS SCHEDULED
Qty: 12 TABLET | Refills: 0 | Status: SHIPPED | OUTPATIENT
Start: 2019-08-06 | End: 2019-08-08 | Stop reason: SDUPTHER

## 2019-08-06 RX ORDER — CIPROFLOXACIN 500 MG/1
500 TABLET, FILM COATED ORAL EVERY 12 HOURS SCHEDULED
Qty: 8 TABLET | Refills: 0 | Status: SHIPPED | OUTPATIENT
Start: 2019-08-06 | End: 2019-08-08 | Stop reason: SDUPTHER

## 2019-08-06 RX ORDER — ISOSORBIDE MONONITRATE 30 MG/1
60 TABLET, EXTENDED RELEASE ORAL DAILY
Qty: 30 TABLET | Refills: 0 | Status: SHIPPED | OUTPATIENT
Start: 2019-08-06 | End: 2019-11-25 | Stop reason: SDUPTHER

## 2019-08-06 RX ADMIN — VITAMIN D, TAB 1000IU (100/BT) 2000 UNITS: 25 TAB at 08:56

## 2019-08-06 RX ADMIN — CIPROFLOXACIN HYDROCHLORIDE 500 MG: 500 TABLET, FILM COATED ORAL at 08:55

## 2019-08-06 RX ADMIN — ATORVASTATIN CALCIUM 40 MG: 40 TABLET, FILM COATED ORAL at 08:55

## 2019-08-06 RX ADMIN — METRONIDAZOLE 500 MG: 500 TABLET, FILM COATED ORAL at 05:12

## 2019-08-06 RX ADMIN — METRONIDAZOLE 500 MG: 500 TABLET, FILM COATED ORAL at 13:49

## 2019-08-06 RX ADMIN — RANOLAZINE 500 MG: 500 TABLET, FILM COATED, EXTENDED RELEASE ORAL at 08:56

## 2019-08-06 RX ADMIN — ASPIRIN 81 MG: 81 TABLET, COATED ORAL at 09:00

## 2019-08-06 NOTE — PLAN OF CARE
Problem: Potential for Falls  Goal: Patient will remain free of falls  Description  INTERVENTIONS:  - Assess patient frequently for physical needs  -  Identify cognitive and physical deficits and behaviors that affect risk of falls    -  [place] fall precautions as indicated by assessment   - Educate patient/family on patient safety including physical limitations  - Instruct patient to call for assistance with activity based on assessment  - Modify environment to reduce risk of injury  - Consider OT/PT consult to assist with strengthening/mobility  Outcome: Progressing     Problem: GASTROINTESTINAL - ADULT  Goal: Minimal or absence of nausea and/or vomiting  Description  INTERVENTIONS:  - Administer IV fluids as ordered to ensure adequate hydration  - Maintain NPO status until nausea and vomiting are resolved  - Nasogastric tube as ordered  - Administer ordered antiemetic medications as needed  - Provide nonpharmacologic comfort measures as appropriate  - Advance diet as tolerated, if ordered  - Nutrition services referral to assist patient with adequate nutrition and appropriate food choices  Outcome: Progressing  Goal: Maintains or returns to baseline bowel function  Description  INTERVENTIONS:  - Assess bowel function  - Encourage oral fluids to ensure adequate hydration  - Administer IV fluids as ordered to ensure adequate hydration  - Administer ordered medications as needed  - Encourage mobilization and activity  - Nutrition services referral to assist patient with appropriate food choices  Outcome: Progressing  Goal: Maintains adequate nutritional intake  Description  INTERVENTIONS:  - Monitor percentage of each meal consumed  - Identify factors contributing to decreased intake, treat as appropriate  - Assist with meals as needed  - Monitor I&O, WT and lab values  - Obtain nutrition services referral as needed  Outcome: Progressing  Goal: Establish and maintain optimal ostomy function  Description  INTERVENTIONS:  - Assess bowel function  - Encourage oral fluids to ensure adequate hydration  - Administer IV fluids as ordered to ensure adequate hydration  - Administer ordered medications as needed  - Encourage mobilization and activity  - Nutrition services referral to assist patient with appropriate food choices  - Assess stoma site  Outcome: Progressing     Problem: HEMATOLOGIC - ADULT  Goal: Maintains hematologic stability  Description  INTERVENTIONS  - Assess for signs and symptoms of bleeding or hemorrhage  - Monitor labs  - Administer supportive blood products/factors as ordered and appropriate  Outcome: Progressing

## 2019-08-06 NOTE — PLAN OF CARE
Problem: Potential for Falls  Goal: Patient will remain free of falls  Description  INTERVENTIONS:  - Assess patient frequently for physical needs  -  Identify cognitive and physical deficits and behaviors that affect risk of falls    -  [place] fall precautions as indicated by assessment   - Educate patient/family on patient safety including physical limitations  - Instruct patient to call for assistance with activity based on assessment  - Modify environment to reduce risk of injury  - Consider OT/PT consult to assist with strengthening/mobility  Outcome: Progressing     Problem: GASTROINTESTINAL - ADULT  Goal: Minimal or absence of nausea and/or vomiting  Description  INTERVENTIONS:  - Administer IV fluids as ordered to ensure adequate hydration  - Maintain NPO status until nausea and vomiting are resolved  - Nasogastric tube as ordered  - Administer ordered antiemetic medications as needed  - Provide nonpharmacologic comfort measures as appropriate  - Advance diet as tolerated, if ordered  - Nutrition services referral to assist patient with adequate nutrition and appropriate food choices  Outcome: Progressing  Goal: Maintains or returns to baseline bowel function  Description  INTERVENTIONS:  - Assess bowel function  - Encourage oral fluids to ensure adequate hydration  - Administer IV fluids as ordered to ensure adequate hydration  - Administer ordered medications as needed  - Encourage mobilization and activity  - Nutrition services referral to assist patient with appropriate food choices  Outcome: Progressing  Goal: Maintains adequate nutritional intake  Description  INTERVENTIONS:  - Monitor percentage of each meal consumed  - Identify factors contributing to decreased intake, treat as appropriate  - Assist with meals as needed  - Monitor I&O, WT and lab values  - Obtain nutrition services referral as needed  Outcome: Progressing  Goal: Establish and maintain optimal ostomy function  Description  INTERVENTIONS:  - Assess bowel function  - Encourage oral fluids to ensure adequate hydration  - Administer IV fluids as ordered to ensure adequate hydration  - Administer ordered medications as needed  - Encourage mobilization and activity  - Nutrition services referral to assist patient with appropriate food choices  - Assess stoma site  Outcome: Progressing     Problem: HEMATOLOGIC - ADULT  Goal: Maintains hematologic stability  Description  INTERVENTIONS  - Assess for signs and symptoms of bleeding or hemorrhage  - Monitor labs  - Administer supportive blood products/factors as ordered and appropriate  Outcome: Progressing

## 2019-08-06 NOTE — ASSESSMENT & PLAN NOTE
· BP on the lower end of normal   Patient denies any dizziness  Will decrease dose of losartan(25 milligram daily )and isosorbide(30 milligram daily) because of borderline low blood pressure  Recommended patient to follow up with her primary cardiologist and primary care physician for adjustment of her outpatient doses of medications

## 2019-08-06 NOTE — PROGRESS NOTES
Progress Note - Clare Mcardle 77 y o  female MRN: [id number]    Unit/Bed#: -01 Encounter: [id number]    Assessment and Plan:   Principal Problem:    SIRS (systemic inflammatory response syndrome) (HCC)  Active Problems:    Essential hypertension    Colitis    Descending colitis with rectal bleeding  -likely ischemic, she has a history of this in 2016 and in 2017  Stools studies negative  - Vascular surgery had been consulted, no intervention at this time, SMA patent  -continue gentle IV hydration  -low residue diet  -Given persistent rectal bleeding she was started on antibiotics, continue to total 5 days  -Recommend outpatient colonoscopy in about 6 weeks to assess for healing of mucosa, last colonoscopy was in 2016  -please call if questions    ----------------------------------------------------------------------------------------------------------------    Subjective:     She feels well, no further bm or rectal bleeding for over 24 hours  Only mild abdominal discomfort  No nausea or vomiting  No post prandial pain    Objective:     Vitals: Blood pressure 98/60, pulse 75, temperature 98 1 °F (36 7 °C), temperature source Oral, resp  rate 18, height 4' 10" (1 473 m), weight 57 9 kg (127 lb 10 3 oz), SpO2 95 %, not currently breastfeeding  ,Body mass index is 26 68 kg/m²        Intake/Output Summary (Last 24 hours) at 8/6/2019 0485  Last data filed at 8/5/2019 2116  Gross per 24 hour   Intake    Output 200 ml   Net -200 ml       Physical Exam:     General Appearance: Alert, appears stated age and cooperative  Lungs: Clear to auscultation bilaterally, no rales or rhonchi, no labored breathing/accessory muscle use  Heart: Regular rate and rhythm, S1, S2 normal, no murmur, click, rub or gallop  Abdomen: Soft, non-tender, non-distended; bowel sounds normal; no masses or no organomegaly  Extremities: No cyanosis, clubbing, or edema    Invasive Devices     Peripheral Intravenous Line            Peripheral IV 08/05/19 Left Arm less than 1 day                Lab Results:  Results from last 7 days   Lab Units 08/06/19  0551  08/03/19  0817   WBC Thousand/uL 7 63   < > 11 86*   HEMOGLOBIN g/dL 12 4   < > 13 6   HEMATOCRIT % 38 9   < > 42 5   PLATELETS Thousands/uL 170   < > 204   NEUTROS PCT %  --   --  93*   LYMPHS PCT %  --   --  5*   MONOS PCT %  --   --  2*   EOS PCT %  --   --  0    < > = values in this interval not displayed  Results from last 7 days   Lab Units 08/05/19  0636   POTASSIUM mmol/L 3 5   CHLORIDE mmol/L 105   CO2 mmol/L 29   BUN mg/dL 9   CREATININE mg/dL 0 90   CALCIUM mg/dL 8 6   ALK PHOS U/L 66   ALT U/L 21   AST U/L 20     Results from last 7 days   Lab Units 08/03/19  0817   INR  0 96     Results from last 7 days   Lab Units 08/03/19  0817   LIPASE u/L 88       Imaging Studies: I have personally reviewed pertinent imaging studies  Ct Abdomen Pelvis With Contrast    Result Date: 8/3/2019  Impression: Left-sided colitis with no bowel obstruction or pneumatosis intestinalis   Workstation performed: [id number]

## 2019-08-06 NOTE — PHYSICAL THERAPY NOTE
Physical Therapy Evaluation [id number]    Patient Name: Jaquelin Wise    IGJSY'L Date: 8/6/2019     Problem List  Patient Active Problem List   Diagnosis    CAD (coronary artery disease)    Essential hypertension    GERD (gastroesophageal reflux disease)    Colitis    Hyperlipidemia    Uterovaginal prolapse    Ischemic colitis [facility])        Past Medical History  Past Medical History:   Diagnosis Date    Anal fissure     Cardiac disorder     Esophageal reflux     Esophagitis, reflux     Hemorrhoids     Hepatic hemangioma     Last Assessed: 1/13/2015    Herpes zoster     History of colonic polyps     Hypertension     [facility]schemic colitis ([facility] )     Lumbar herniated disc     Malignant neoplasm without specification of site ([facility] )     Nephrolithiasis     L  Lithotripsy    Nontoxic single thyroid nodule     Last Assessed: 1/13/2015    Osteoarthritis     Overactive bladder     Raynaud disease     Respiratory system disease     Sjogren's disease ([facility] )     Spinal stenosis     PONCHO (stress urinary incontinence, female)     Uterovaginal prolapse     Grade I-II        Past Surgical History  Past Surgical History:   Procedure Laterality Date    APPENDECTOMY  1947    CARDIAC SURGERY      CABG    CATARACT EXTRACTION Bilateral     COLONOSCOPY  2012    Fiberoptic    COLONOSCOPY      Resolved: 2006 - 2012 5 year f/u    CORONARY ANGIOPLASTY WITH STENT PLACEMENT      CORONARY ARTERY BYPASS GRAFT      Resolved: 2012    ESOPHAGOGASTRODUODENOSCOPY  2012    Diagnostic    HEMORROIDECTOMY      KNEE SURGERY      LITHOTRIPSY      Renal    MALIGNANT SKIN LESION EXCISION      Face; Resolved: 2004    GA ESOPHAGOGASTRODUODENOSCOPY TRANSORAL DIAGNOSTIC N/A 4/13/2016    Procedure: EGD AND COLONOSCOPY;  Surgeon: Cara Sibley MD;  [facility];   Service: Gastroenterology    RENAL ARTERY STENT      SKIN LESION EXCISION      Scalp    SOFT TISSUE TUMOR RESECTION      Shoulder; Resolved:     THROMBOLYSIS      Postoperative Thrombolysis PTCA    TONSILLECTOMY      Resolved: 19 1046   Note Type   Note type Eval/Treat   Pain Assessment   Pain Assessment 0-10   Pain Score 1   Pain Type Acute pain   Pain Location Abdomen   Pain Orientation Mid   Pain Descriptors Cramping   Pain Frequency Intermittent   Clinical Progression Gradually improving   Patient's Stated Pain Goal No pain   Hospital Pain Intervention(s) Ambulation/increased activity;Repositioned;Distraction; Emotional support   Response to Interventions tolerated    Home Living   Type of [address] One level  ( 1 LUDWIN through garage )   [address] Walker;Cane  ( sister's = does not use PTA)   Additional Comments spouse home - A w/ cooking; cleaning and driving- PTA pt was indep w/o AD and sposue S for ADL's 0 falls  I w/ dressing bathing pt does not drive    Prior Function   Level of [place] Modified independent with wheelchair   Lives With Spouse   Receives Help From Family   ADL Assistance Independent   IADLs Needs assistance   Falls in the last 6 months 0   Comments see above    Restrictions/Precautions   Weight Bearing Precautions Per Order No   Other Precautions Hard of hearing;Pain; Fall Risk;Multiple lines   General   Family/Caregiver Present Yes  (spouse [facility] )   Cognition   Orientation Level Oriented X4   Following Commands Follows one step commands without difficulty  (barrier [place])   Comments requires assist for safety w/ lines- limited by [facility]- refuses to use or trial RW for safety and balacne while in hospital- spouse confirms pt will not use walker at home      RUE Assessment   RUE Assessment WFL   LUE Assessment   LUE Assessment WFL   RLE Assessment   RLE Assessment WFL  (functional at least 4/5 )   LLE Assessment   LLE Assessment WFL  (functional at least 4/5 )   Coordination   Movements are Fluid and Coordinated 1   Light Touch   RLE Light Touch Grossly intact   LLE Light Touch Grossly intact   Bed Mobility   Supine to Sit 5  Supervision   Additional items Increased time required   Transfers   Sit to Stand 5  Supervision   Additional items Increased time required   Stand to Sit 4  Minimal assistance  (for safety adn controlled descent to chair )   Stand pivot 4  Minimal assistance  (CGA assist for safety- pt declining RW use dispite recommend)   Additional Comments pt declining RW use and trial despite recommendation- pt requring CGA/ HHA for mobiltiy for balance and safety - Despite PT education pt continues to decline stating "he will hold onto me if iI need it"    Ambulation/Elevation   Gait pattern Excessively slow; Foward flexed;Decreased foot clearance   Gait Assistance 4  Minimal assist   Additional items Assist x 1;Assist x 3  (CGA/ HHA for safety and balance )   Assistive Device None   Distance 40   Balance   Static Sitting Good   Dynamic Sitting Fair +   Static Standing Fair -   Dynamic Standing Poor +   Ambulatory Poor +   Endurance Deficit   Endurance Deficit Yes   Endurance Deficit Description fatigue reported from active baseline - limited activity tolerance    Activity Tolerance   Activity Tolerance Patient limited by fatigue   Medical Staff Made Aware yes RN and CM updated    Nurse Made Aware yes   Assessment   Prognosis Good   Problem List Decreased strength;Decreased endurance; Impaired balance;Decreased mobility; Impaired judgement;Decreased safety awareness; Impaired vision; Impaired hearing;Pain   Assessment Pt is 77 y o  female seen for PT evaluation s/p admit to [address] [place] [address] on 8/3/2019  Pt presenting from home w/ loose bloody stools and abdominal pain  Current dx/ problem list includes: hematochezia; acute hemorrhagic colitis; SIRS; HTN  PT now consulted for assessment of mobility and d/c needs  Comorbidities affecting pt's physical performance at time of assessment listed above  Personal factors affecting pt at time of IE include: steps to enter environment, advanced age, past experience,limited insight into impairments; refusal to trial AD; [place] visual impairment lines and ongoing medical w/u  Prior to admission, pt reports living w/ spouse in a SS home w/ LUDWIN- pt never used AD at baseline- and denies falls- spouse is home 24/7 to assist and drives; pt and spouse share IADL cooking/ cleaning responsibilities   Upon evaluation, pt currently is requiring S A for bed skills; S for functional transfers and [place]/ CGA for ambulation 50' x1- pt fatigues easily and would benefit from trial of RW however pt adamantly refuses- and requests HHA by PT or spouse as "only option" as she "is not old"  Pt presents functioning below baseline and currently w/ overall mobility deficits 2* to: decreased LE strength/AROM; limited flexibility;  generalized weakness/ deconditioning; decreased endurance; decreased activity tolerance; impaired balance; gait deviations; decreased safety awarenes mild abdominal pain/ discomfort; fatigue; impaired safety and judgement; limited insight into current deficits; lines; declining use of AD  Pt currently at risk for falls  (Please find additional objective findings from PT assessment regarding body systems outlined above ) Pt will continue to benefit from skilled PT interventions to address stated impairments; to maximize functional potential; for ongoing pt/ family training; and DME needs Anticipate that with continued progress pt to d/c home with family support and hoe PT when medically cleared  Pt/ family agreeable to plan and goals as stated on evaluation        Barriers to Discharge Comments 1 LUDWIN; declining use of RW for balance and ssafety on eval despite education- will cont to progress and educate (+) fall risk) at [facility]    Goals   Patient Goals go home ; not use a walker like an old person    STG Expiration Date 08/16/19   Short Term Goal #1 In 10 days pt will complete: 1) Bed mobility skills with Mi to facilitate safe return to previous living environment 2) Functional transfers with MI  to facilitate safe return to previous living environment  3) Ambulation with least restrictive AD MI/ indep 150'x 2' without LOB and stable vitals for safe ambulation home/ community distances  4) Stair training up/ down 2 step/s with 1 rail/s  and S for safe access to previous living environment  5) Improve balance grades to Good 6) Improve LE strength grades by 1 to increase independence w/ transfers and gait  7) LE HEP independently  8) PT for ongoing pt and family education; DME needs and D/C planning to promote highest level of function in least restrictive environment  Treatment Day 0   Plan   Treatment/Interventions Functional transfer training;LE strengthening/ROM; Elevations; Therapeutic exercise; Endurance training;Patient/family training;Equipment eval/education; Bed mobility;Gait training;Spoke to nursing;Spoke to case management; Family   PT Frequency   (3-5x/wk )   Recommendation   Recommendation Home with family support;Home PT   Equipment Recommended Walker  (RW- pt currently declining )   Modified Livingston Scale   Modified Livingston Scale 4   Barthel Index   Feeding 10   Bathing 0   Grooming Score 5   Dressing Score 10   Bladder Score 10   Bowels Score 5   Toilet Use Score 5   Transfers (Bed/Chair) Score 10   Mobility (Level Surface) Score 0   Stairs Score 0   Barthel Index Score 55          PHYSICAL THERAPY TREATMENT [id number]  Following completion of PT eval and return to chair pt requesting to use bathroom  Pt was able to perform sit<> stand from recliner and ambulate to bathroom ~20' x1 initially w/ close S- pt noted to be reaching for IV pole for balance and support and again PT attempted to educated pt on use / trial of SPC vs RW for increased balance and safety and pt/ spouse again declined  PT provided HHA for balance alone w/ assist for lines/ IV   Pt required [place] for balance during standing for toilet hygiene  Pt ambulated back to chair w/ HHA 20' w/ all needs in reach  PT noting that IV line was potentially infiltrated and RN was notified - IV placed on hold   Pt will cont to benefit from skilled PT for goals as outlined on IE      Monet Peterson, PT

## 2019-08-06 NOTE — DISCHARGE SUMMARY
Discharge- Sandra [place] 1940, 77 y o  female MRN: [id number]    Unit/Bed#: -01 Encounter: [id number]    Primary Care Provider: Crispin Levine MD   Date and time admitted to hospital: 8/3/2019  7:31 AM        * Colitis  Assessment & Plan  · Highly suspicious for ischemic given history of celiac, SMA, and JETHRO stenoses  Vascular surgery recommending no further intervention at this time   · Started on antibiotics because of ongoing rectal bleeding  Rectal bleeding has resolved in the last 24 hours  Complete total of 5 days of antibiotics per GI recommendations  Outpatient colonoscopy in 6 weeks per GI   · C  Diff negative   · Stool PCR and O/P Pending   ·     SIRS (systemic inflammatory response syndrome) (HCC)  Assessment & Plan  · POA, resolved  Blood cultures negative to date  Essential hypertension  Assessment & Plan  · BP on the lower end of normal   Patient denies any dizziness  Will decrease dose of losartan(25 milligram daily )and isosorbide(30 milligram daily) because of borderline low blood pressure  Recommended patient to follow up with her primary cardiologist and primary care physician for adjustment of her outpatient doses of medications            Discharging Physician / Practitioner: Donald Ayers MD  PCP: Crispin Levine MD  Admission Date:   Admission Orders (From admission, onward)     Ordered        08/03/19 0928  Inpatient Admission (expected length of stay for this patient Order details is greater than two midnights)  Once                   Discharge Date: 08/06/19    Resolved Problems  Date Reviewed: 8/5/2019          Resolved    Hypokalemia 8/5/2019     Resolved by  Rebeca Rodriguez PA-C          Consultations During Hospital Stay:  · Gastroenterology  · Vascular surgery    Procedures Performed:   ·  CT scan of the abdomenLeft-sided colitis with no bowel obstruction or pneumatosis intestinalis  Mesenteric duplex ultrasound: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  Celiac axis is patent  Significant Findings / Test Results:   · Carotid Doppler ultrasound shows greater than 70 percent stenosis in the left internal carotid artery and less than 50 percent stenosis in the right internal carotid artery  Incidental Findings:   · See above    Test Results Pending at Discharge (will require follow up): · Stool ova and parasites     Outpatient Tests Requested:  Vascular surgery follow-up on 45/17/46  Complications:  None  Reason for Admission:  Blood in the stool  Hospital Course:     Anais Aranda is a 77 y o  female patient who originally presented to the hospital on 8/3/2019 due to bloody bowel movement  Patient has history of hypertension, hyperlipidemia, coronary artery disease, renal artery stenosis, peripheral arterial disease and prior history of ischemic colitis  She underwent CTA of the abdomen and mesenteric duplex which showed elevated velocity an SMA however on CT scan the SMA was widely patent  CT scan findings were consistent with left sided colitis  She was well by vascular surgery and Gastroenterology  Stool studies were done  Stool for C diff toxin assay was negative  Her blood cultures remained sterile  She was maintained on antibiotics per GI recommendations  No further vascular intervention was recommended per vascular surgery  GI recommended patient to complete total of 5 days of antibiotics and outpatient colonoscopy in 6 weeks  Patient has had no further episode of bleeding on the day of discharge and her hemoglobin had remained stable  She was deemed stable for discharge home with outpatient GI follow-up  Of note patient had borderline low blood pressure but do denied any dizziness  Her antihypertensive medication doses were adjusted prior to discharge  Please see above list of diagnoses and related plan for additional information       Condition at Discharge: stable     Discharge Day Visit / Exam: Subjective:  Patient denies any further episode of rectal bleed  Denies any nausea vomiting  Complains of occasional abdominal discomfort  Remains afebrile  Vitals: Blood Pressure: 98/60 (08/06/19 0726)  Pulse: 75 (08/06/19 0709)  Temperature: 98 1 °F (36 7 °C) (08/06/19 0709)  Temp Source: Oral (08/06/19 0709)  Respirations: 18 (08/06/19 0709)  Height: 4' 10" (147 3 cm) (08/03/19 1046)  Weight - Scale: 57 9 kg (127 lb 10 3 oz) (08/06/19 0600)  SpO2: 95 % (08/06/19 0709)  Exam:   Physical Exam   Constitutional: She is oriented to person, place, and time  No distress  HENT:   Head: Normocephalic and atraumatic  Mouth/Throat: Oropharynx is clear and moist    Eyes: Pupils are equal, round, and reactive to light  EOM are normal    Neck: Normal range of motion  Neck supple  Cardiovascular: Normal rate and regular rhythm  Pulmonary/Chest: Effort normal and breath sounds normal    Abdominal: Soft  Bowel sounds are normal    Neurological: She is alert and oriented to person, place, and time  Skin: Skin is warm and dry  She is not diaphoretic  Psychiatric: She has a normal mood and affect  Discussion with Family:  Discussed with patient's  at bedside  Discharge instructions/Information to patient and family:   See after visit summary for information provided to patient and family  Provisions for Follow-Up Care:  See after visit summary for information related to follow-up care and any pertinent home health orders  Disposition:     Home    For Discharges to [facility] SNF:   · Not Applicable to this Patient - Not Applicable to this Patient    Planned Readmission: NO     Discharge Statement:  I spent 35 minutes discharging the patient  This time was spent on the day of discharge  I had direct contact with the patient on the day of discharge   Greater than 50% of the total time was spent examining patient, answering all patient questions, arranging and discussing plan of care with patient as well as directly providing post-discharge instructions  Additional time then spent on discharge activities  Discharge Medications:  See after visit summary for reconciled discharge medications provided to patient and family        ** Please Note: This note has been constructed using a voice recognition system **

## 2019-08-06 NOTE — ASSESSMENT & PLAN NOTE
· Highly suspicious for ischemic given history of celiac, SMA, and JETHRO stenoses  Vascular surgery recommending no further intervention at this time   · Started on antibiotics because of ongoing rectal bleeding  Rectal bleeding has resolved in the last 24 hours  Complete total of 5 days of antibiotics per GI recommendations  Outpatient colonoscopy in 6 weeks per GI   · C   Diff negative   · Stool PCR and O/P Pending   ·

## 2019-08-06 NOTE — PLAN OF CARE
Problem: PHYSICAL THERAPY ADULT  Goal: Performs mobility at highest level of function for planned discharge setting  See evaluation for individualized goals  Description  Treatment/Interventions: Functional transfer training, LE strengthening/ROM, Elevations, Therapeutic exercise, Endurance training, Patient/family training, Equipment eval/education, Bed mobility, Gait training, Spoke to nursing, Spoke to case management, Family  Equipment Recommended: Walker(RW- pt currently declining )       See flowsheet documentation for full assessment, interventions and recommendations  Note:   Prognosis: Good  Problem List: Decreased strength, Decreased endurance, Impaired balance, Decreased mobility, Impaired judgement, Decreased safety awareness, Impaired vision, Impaired hearing, Pain  Assessment: Pt is 77 y o  female seen for PT evaluation s/p admit to [facility] on 8/3/2019  Pt presenting from home w/ loose bloody stools and abdominal pain  Current dx/ problem list includes: hematochezia; acute hemorrhagic colitis; SIRS; HTN  PT now consulted for assessment of mobility and d/c needs  Comorbidities affecting pt's physical performance at time of assessment listed above  Personal factors affecting pt at time of IE include: steps to enter environment, advanced age, past experience,limited insight into impairments; refusal to trial AD; [facility] visual impairment lines and ongoing medical w/u  Prior to admission, pt reports living w/ spouse in a SS home w/ LUDWIN- pt never used AD at baseline- and denies falls- spouse is home 24/7 to assist and drives; pt and spouse share IADL cooking/ cleaning responsibilities   Upon evaluation, pt currently is requiring S A for bed skills; S for functional transfers and [place]/ CGA for ambulation 50' x1- pt fatigues easily and would benefit from trial of RW however pt adamantly refuses- and requests HHA by PT or spouse as "only option" as she "is not old"     Pt presents functioning below baseline and currently w/ overall mobility deficits 2* to: decreased LE strength/AROM; limited flexibility;  generalized weakness/ deconditioning; decreased endurance; decreased activity tolerance; impaired balance; gait deviations; decreased safety awarenes mild abdominal pain/ discomfort; fatigue; impaired safety and judgement; limited insight into current deficits; lines; declining use of AD  Pt currently at risk for falls  (Please find additional objective findings from PT assessment regarding body systems outlined above ) Pt will continue to benefit from skilled PT interventions to address stated impairments; to maximize functional potential; for ongoing pt/ family training; and DME needs Anticipate that with continued progress pt to d/c home with family support and hoe PT when medically cleared  Pt/ family agreeable to plan and goals as stated on evaluation  Barriers to Discharge Comments: 1 LUDWIN; declining use of RW for balance and ssafety on eval despite education- will cont to progress and educate (+) fall risk) at [facility]   Recommendation: Home with family support, Home PT          See flowsheet documentation for full assessment

## 2019-08-07 ENCOUNTER — TRANSITIONAL CARE MANAGEMENT (OUTPATIENT)
Dept: FAMILY MEDICINE CLINIC | Facility: MEDICAL CENTER | Age: 79
End: 2019-08-07

## 2019-08-07 LAB — O+P STL CONC: NORMAL

## 2019-08-08 ENCOUNTER — OFFICE VISIT (OUTPATIENT)
Dept: VASCULAR SURGERY | Facility: CLINIC | Age: 79
End: 2019-08-08
Payer: MEDICARE

## 2019-08-08 VITALS
BODY MASS INDEX: 27.92 KG/M2 | HEART RATE: 82 BPM | SYSTOLIC BLOOD PRESSURE: 116 MMHG | DIASTOLIC BLOOD PRESSURE: 64 MMHG | TEMPERATURE: 98.7 F | HEIGHT: 58 IN | WEIGHT: 133 LBS

## 2019-08-08 DIAGNOSIS — K55.1 MESENTERIC ARTERY STENOSIS (HCC): ICD-10-CM

## 2019-08-08 DIAGNOSIS — K52.9 COLITIS: ICD-10-CM

## 2019-08-08 DIAGNOSIS — I65.23 ASYMPTOMATIC BILATERAL CAROTID ARTERY STENOSIS: Primary | ICD-10-CM

## 2019-08-08 DIAGNOSIS — I70.213 ATHEROSCLEROSIS OF NATIVE ARTERY OF BOTH LOWER EXTREMITIES WITH INTERMITTENT CLAUDICATION (HCC): ICD-10-CM

## 2019-08-08 PROBLEM — I70.219 ATHEROSCLER NATIVE ARTERIES THE EXTREMITIES W/INTERMIT CLAUDICATION (HCC): Status: ACTIVE | Noted: 2019-08-08

## 2019-08-08 LAB
BACTERIA BLD CULT: NORMAL
BACTERIA BLD CULT: NORMAL

## 2019-08-08 PROCEDURE — 99214 OFFICE O/P EST MOD 30 MIN: CPT | Performed by: NURSE PRACTITIONER

## 2019-08-08 RX ORDER — CIPROFLOXACIN 500 MG/1
500 TABLET, FILM COATED ORAL EVERY 12 HOURS SCHEDULED
Qty: 8 TABLET | Refills: 0 | Status: SHIPPED | OUTPATIENT
Start: 2019-08-08 | End: 2019-08-12

## 2019-08-08 RX ORDER — METRONIDAZOLE 500 MG/1
500 TABLET ORAL EVERY 8 HOURS SCHEDULED
Qty: 12 TABLET | Refills: 0 | Status: SHIPPED | OUTPATIENT
Start: 2019-08-08 | End: 2019-08-12

## 2019-08-08 RX ORDER — PRASUGREL 10 MG/1
10 TABLET, FILM COATED ORAL EVERY OTHER DAY
COMMUNITY
End: 2020-01-17

## 2019-08-08 NOTE — PATIENT INSTRUCTIONS
Peripheral Artery Disease   WHAT YOU NEED TO KNOW:   What is peripheral artery disease? Peripheral artery disease (PAD) is narrow, weak, or blocked arteries  It may affect any arteries outside of your heart and brain  PAD is usually the result of a buildup of fat and cholesterol, also called plaque, along your artery walls  Inflammation, a blood clot, or abnormal cell growth could also block your arteries  PAD prevents normal blood flow to your legs and arms  You are at risk of an amputation if poor blood flow keeps wounds from healing or causes gangrene (tissue death)  Without treatment, PAD can also cause a heart attack or stroke  What increases my risk for PAD? · Smoking cigarettes     · Diabetes     · High blood pressure     · High cholesterol     · Age older than 40 years    · Heart disease or a family history of heart disease  What are the signs and symptoms of PAD? Mild PAD usually does not cause symptoms  As the disease worsens over time, you may have the following:  · Pain or cramps in your leg or hip while you walk     · A numb, weak, or heavy feeling in your legs     · Dry, scaly, red, or pale skin on your legs     · Thick or brittle nails, or hair loss on your arms and legs     · Foot sores that will not heal     · Burning or aching in your feet and toes while resting (this may be worse when you lie down)  How is PAD diagnosed? · Angiography  is a test that shows pictures of the arteries in your arms and legs  You will be given contrast liquid to help the arteries show up better on the pictures  The pictures will be taken with an MRI or CT scan  Tell the healthcare provider if you have ever had an allergic reaction to contrast liquid  Do not enter the MRI room with anything metal  Metal can cause serious injury  Tell a healthcare provider if you have any metal in or on your body      · Doppler ankle brachial index (RHONDA)  is a test that compares blood pressure in your ankles to blood pressure in your arms  This tells your healthcare provider how well blood is flowing through the arteries in your legs  How is PAD treated? Treatment can help reduce your risk of a heart attack, stroke, or amputation  You may need more than one of the following:  · Medicines  may be given  Your healthcare provider may give you any of the following:     ¨ Antiplatelet medicine,  such as aspirin, helps prevent blood clots and reduces the risk of a heart attack or stroke  ¨ Statin medicine  helps lower your cholesterol and prevents your PAD from getting worse  · A supervised exercise program  helps you stay active in normal daily activities and may prevent disability  Healthcare providers will help you safely walk or do strength training exercises 3 times a week for 30 to 60 minutes  You will do this for several months, then transition to walking on your own  · Angioplasty  is a procedure to open your artery so blood can flow through normally  A thin tube called a catheter is used to insert a small balloon into your artery  The balloon is inflated to open your blocked artery, and then removed  A tube called a stent may be placed in your artery to hold it open  · Bypass surgery  is used to make a new connection to your artery with a vein from another part of your body, or an artificial graft  The vein or graft is attached to your artery above and below your blockage  This allows blood to flow around the blocked portion of your artery  How can I manage PAD? · Do not smoke  Nicotine and other chemicals in cigarettes and cigars can worsen PAD  They can also increase your risk for a heart attack or stroke  Ask your healthcare provider for information if you currently smoke and need help to quit  E-cigarettes or smokeless tobacco still contain nicotine  Talk to your healthcare provider before you use these products  · Manage other health conditions  Take your medicines as directed   Follow your healthcare provider's instructions if you have high blood pressure or high cholesterol  Perform foot care and check your blood sugar levels as directed if you have diabetes  · Eat heart healthy foods  Eat whole grains, fruits, and vegetables every day  Limit salt and high-fat foods  Ask your healthcare provider for more information on a heart healthy diet  Ask if you need to lose weight  Your healthcare provider can help you create a healthy weight-loss plan  Call 911 for the following:   · You have any of the following signs of a heart attack:      ¨ Squeezing, pressure, or pain in your chest that lasts longer than 5 minutes or returns    ¨ Discomfort or pain in your back, neck, jaw, stomach, or arm     ¨ Trouble breathing    ¨ Nausea or vomiting    ¨ Lightheadedness or a sudden cold sweat, especially with chest pain or trouble breathing    · You have any of the following signs of a stroke:      ¨ Numbness or drooping on one side of your face     ¨ Weakness in an arm or leg    ¨ Confusion or difficulty speaking    ¨ Dizziness, a severe headache, or vision loss  When should I seek immediate care? · You have sores or wounds that will not heal      · You notice black or discolored skin on your arm or leg  · Your skin is cool to the touch  When should I contact my healthcare provider? · You have leg pain when you walk 1/8 mile (200 meters) or less, even with treatment  · Your legs are red, dry, or pale, even with treatment  · You have questions or concerns about your condition or care  CARE AGREEMENT:   You have the right to help plan your care  Learn about your health condition and how it may be treated  Discuss treatment options with your caregivers to decide what care you want to receive  You always have the right to refuse treatment  The above information is an  only  It is not intended as medical advice for individual conditions or treatments   Talk to your doctor, nurse or pharmacist before following any medical regimen to see if it is safe and effective for you  © 2017 [address] [employer] Information is for End User's use only and may not be sold, redistributed or otherwise used for commercial purposes  All illustrations and images included in CareNotes® are the copyrighted property of A D A M [employer]  or Martin Florez  Carotid Artery Disease   AMBULATORY CARE:   Carotid artery disease  is a condition that causes narrow or blocked carotid arteries  Your carotid arteries are the blood vessels that supply your brain with most of the blood it needs to work  You have 2 carotid arteries, one on each side of your neck  Call 911 for any of the following:   · You have any of the following signs of a stroke:      ¨ Numbness or drooping on one side of your face     ¨ Weakness in an arm or leg    ¨ Confusion or difficulty speaking    ¨ Dizziness, a severe headache, or vision loss    · You have any of the following signs of a heart attack:      ¨ Squeezing, pressure, or pain in your chest that lasts longer than 5 minutes or returns    ¨ Discomfort or pain in your back, neck, jaw, stomach, or arm     ¨ Trouble breathing    ¨ Nausea or vomiting    ¨ Lightheadedness or a sudden cold sweat, especially with chest pain or trouble breathing  Contact your healthcare provider if:   · You have questions or concerns about your condition or care  Signs and symptoms of carotid artery disease: You may have no signs or symptoms  Most commonly, carotid artery disease causes transient ischemic attacks (TIAs), or mini-strokes  You may have numbness, weakness, lack of movement, or vision or speech problems  A TIA goes away quickly and does not cause permanent damage  A TIA may be a warning sign that you are about to have a stroke  If you have any symptoms of a TIA or stroke, seek care immediately  Warning signs of a stroke:   The word F A S T  can help you remember and recognize warning signs of a stroke  · F = Face:  One side of the face droops  · A = Arms:  One arm starts to drop when both arms are raised  · S = Speech:  Speech is slurred or sounds different than usual     · T = Time:  A person who is having a stroke needs to be seen immediately  A stroke is a medical emergency that needs immediate treatment  Some medicines and treatments work best if given within a few hours of a stroke  Treatment  for carotid artery disease depends on how narrow your arteries have become, your symptoms, and your general health  The goal of treatment is to lower your risk for a stroke  You may need any of the following:  · Take aspirin if directed  Your healthcare provider may suggest that you take an aspirin a day to prevent blood clots from forming in the carotid arteries  If your healthcare provider wants you to take aspirin daily, do not take acetaminophen or ibuprofen instead  · Control risk factors  High blood pressure, high cholesterol, heart disease, diabetes, and being overweight increase your risk for atherosclerosis  · Procedures can help open blocked arteries  A carotid endarterectomy is used to cut plaque out of the artery  An angioplasty is used to push the plaque against the artery wall with a balloon device  Sometimes a stent is placed during an angioplasty  A stent is a metal mesh tube that is placed in the artery to keep it open  Manage carotid artery disease:   · Eat a variety of healthy foods  Healthy foods include fruit, vegetables, whole-grain breads, low-fat dairy products, lean meat, and fish  Choose fish that are high in omega-3 fatty acids, such as salmon and fresh tuna  Ask your healthcare provider for more information on a heart healthy diet and the DASH eating plan  · Limit sodium (salt)  Sodium may increase your blood pressure  Add less table salt to your foods  Read food labels and choose foods that are low in sodium   Your healthcare provider may suggest you follow a low sodium diet  · Reach or maintain a healthy weight  Extra weight makes your heart work harder  Ask your healthcare provider how much you should weight  He can help you create a safe weight loss plan  Even a weight loss of 10% of your body weight can help your heart function better  · Exercise as directed  Exercise helps improve heart function and can help you manage your weight  Exercise can also help lower your cholesterol and blood sugar levels  Try to get at least 30 minutes of exercise at least 5 times each week  Try to be active every day  Your healthcare provider can help you create an exercise plan that works best for you  · Limit alcohol  Alcohol can increase your blood pressure and triglyceride levels  Men should limit alcohol to 2 drinks per day  Women should limit alcohol to 1 drink per day  A drink of alcohol is 12 ounces of beer, 5 ounces of wine, or 1½ ounces of liquor  · Do not smoke  Nicotine and other chemicals in cigarettes and cigars can cause heart and lung damage  Ask your healthcare provider for information if you currently smoke and need help to quit  E-cigarettes or smokeless tobacco still contain nicotine  Talk to your healthcare provider before you use these products  Follow up with your healthcare provider as directed:  Write down your questions so you remember to ask them during your visits  © 2017 [address] [facility] Information is for End User's use only and may not be sold, redistributed or otherwise used for commercial purposes  All illustrations and images included in CareNotes® are the copyrighted property of [employer]  or Martin Florez  The above information is an  only  It is not intended as medical advice for individual conditions or treatments  Talk to your doctor, nurse or pharmacist before following any medical regimen to see if it is safe and effective for you

## 2019-08-14 DIAGNOSIS — I25.10 CORONARY ARTERIOSCLEROSIS: ICD-10-CM

## 2019-08-14 RX ORDER — ISOSORBIDE MONONITRATE 60 MG/1
TABLET, EXTENDED RELEASE ORAL
Qty: 30 TABLET | Refills: 8 | Status: SHIPPED | OUTPATIENT
Start: 2019-08-14 | End: 2019-11-25 | Stop reason: SDUPTHER

## 2019-08-21 ENCOUNTER — OFFICE VISIT (OUTPATIENT)
Dept: FAMILY MEDICINE CLINIC | Facility: MEDICAL CENTER | Age: 79
End: 2019-08-21
Payer: MEDICARE

## 2019-08-21 VITALS
SYSTOLIC BLOOD PRESSURE: 120 MMHG | OXYGEN SATURATION: 100 % | DIASTOLIC BLOOD PRESSURE: 74 MMHG | HEART RATE: 78 BPM | BODY MASS INDEX: 28.01 KG/M2 | WEIGHT: 134 LBS

## 2019-08-21 DIAGNOSIS — N30.90 CYSTITIS: Primary | ICD-10-CM

## 2019-08-21 LAB
SL AMB  POCT GLUCOSE, UA: ABNORMAL
SL AMB LEUKOCYTE ESTERASE,UA: ABNORMAL
SL AMB POCT BILIRUBIN,UA: ABNORMAL
SL AMB POCT BLOOD,UA: ABNORMAL
SL AMB POCT KETONES,UA: ABNORMAL
SL AMB POCT NITRITE,UA: ABNORMAL
SL AMB POCT PH,UA: 5.5
SL AMB POCT SPECIFIC GRAVITY,UA: 1.03
SL AMB POCT URINE PROTEIN: ABNORMAL
SL AMB POCT UROBILINOGEN: 0.2

## 2019-08-21 PROCEDURE — 87186 SC STD MICRODIL/AGAR DIL: CPT | Performed by: FAMILY MEDICINE

## 2019-08-21 PROCEDURE — 87086 URINE CULTURE/COLONY COUNT: CPT | Performed by: FAMILY MEDICINE

## 2019-08-21 PROCEDURE — 1123F ACP DISCUSS/DSCN MKR DOCD: CPT | Performed by: FAMILY MEDICINE

## 2019-08-21 PROCEDURE — 99213 OFFICE O/P EST LOW 20 MIN: CPT | Performed by: FAMILY MEDICINE

## 2019-08-21 PROCEDURE — 81002 URINALYSIS NONAUTO W/O SCOPE: CPT | Performed by: FAMILY MEDICINE

## 2019-08-21 PROCEDURE — 87077 CULTURE AEROBIC IDENTIFY: CPT | Performed by: FAMILY MEDICINE

## 2019-08-21 RX ORDER — CIPROFLOXACIN 250 MG/1
250 TABLET, FILM COATED ORAL EVERY 12 HOURS SCHEDULED
Qty: 10 TABLET | Refills: 0 | Status: SHIPPED | OUTPATIENT
Start: 2019-08-21 | End: 2019-08-26

## 2019-08-21 NOTE — PROGRESS NOTES
Patient has had dysuria and lower abdominal cramping with urination for the last day or two  She has not had any fever or chills  She has no CVA pain    /74 (BP Location: Left arm, Patient Position: Sitting, Cuff Size: Adult)   Pulse 78   Wt 60 8 kg (134 lb)   SpO2 100%   BMI 28 01 kg/m²     HEENT examination is within normal limits no acute findings  Neck was supple  Chest clear  Cardiac exam revealed a regular rate and rhythm without murmur rub or gallop  Abdomen is soft and nontender  Urinalysis has both leukocytes and blood    Cystitis    Will use Cipro  Urine for culture and sensitivity

## 2019-08-23 LAB — BACTERIA UR CULT: ABNORMAL

## 2019-10-07 DIAGNOSIS — I25.10 CORONARY ARTERIOSCLEROSIS: ICD-10-CM

## 2019-10-07 RX ORDER — RANOLAZINE 500 MG/1
500 TABLET, EXTENDED RELEASE ORAL EVERY 12 HOURS
Qty: 60 TABLET | Refills: 5 | Status: SHIPPED | OUTPATIENT
Start: 2019-10-07 | End: 2020-11-01

## 2019-11-25 ENCOUNTER — TELEPHONE (OUTPATIENT)
Dept: CARDIOLOGY CLINIC | Facility: CLINIC | Age: 79
End: 2019-11-25

## 2019-11-25 DIAGNOSIS — I25.10 CORONARY ARTERIOSCLEROSIS: ICD-10-CM

## 2019-11-25 DIAGNOSIS — I10 ESSENTIAL HYPERTENSION: Primary | Chronic | ICD-10-CM

## 2019-11-25 DIAGNOSIS — I10 HYPERTENSION, UNSPECIFIED TYPE: ICD-10-CM

## 2019-11-25 RX ORDER — LOSARTAN POTASSIUM 25 MG/1
50 TABLET ORAL DAILY
Qty: 90 TABLET | Refills: 1 | Status: SHIPPED | OUTPATIENT
Start: 2019-11-25 | End: 2020-06-06

## 2019-11-25 RX ORDER — AMLODIPINE BESYLATE 5 MG/1
5 TABLET ORAL DAILY
Qty: 90 TABLET | Refills: 1 | Status: SHIPPED | OUTPATIENT
Start: 2019-11-25 | End: 2020-11-01

## 2019-11-25 RX ORDER — ISOSORBIDE MONONITRATE 60 MG/1
60 TABLET, EXTENDED RELEASE ORAL DAILY
Qty: 90 TABLET | Refills: 1 | Status: SHIPPED | OUTPATIENT
Start: 2019-11-25 | End: 2020-08-05 | Stop reason: SDUPTHER

## 2019-11-25 NOTE — TELEPHONE ENCOUNTER
Patient is requesting a refill of amlodipine  Her pcp marked amlodpine 5mg, as therapy completed on 1/28/2019 but patient states she has been taking this med      Is it ok to fill for patient

## 2019-12-01 NOTE — PROGRESS NOTES
Cardiology Follow Up    Concepcion Jalloh  1940  [id number]  [facility]  [address]  [address]  [phone]  [phone]    1  Essential hypertension     2  Pure hypercholesterolemia     3  Coronary arteriosclerosis     4  S/P percutaneous transluminal angioplasty (PTA) with stent placement     5  S/P CABG (coronary artery bypass graft)         Interval History:  Patient is here for a follow-up visit  She has a prior history of CABG x4 and in July 2011 with closure of the LIMA to the LAD documented thereafter and revascularization of the LAD and the diagonal branch with PCI  Jamie Oris most recent catheterization was done in December 2012 which demonstrated recurrent disease which is being managed medically given her anatomy   The patient's most recent pharmacologic nuclear stress test done September 2015 demonstrated a small apical infarct without evidence of prognostically important ischemia   Her most recent echocardiogram done in May 2019 demonstrated  preserved LV systolic function with no significant valvular heart disease and no significant change compared to a prior study done April 6, 2017  She gets occasional chest discomfort but in general is reasonably well compensated  She was admitted to the hospital in August with abdominal pain and was diagnosed as having colitis  She was seen by vascular service at that time in reference to possible ischemic disease  Patient has been well  She has had no chest pain or significant dyspnea      Patient Active Problem List   Diagnosis    CAD (coronary artery disease)    Essential hypertension    GERD (gastroesophageal reflux disease)    Colitis    Hyperlipidemia    Uterovaginal prolapse    Ischemic colitis ([facility] [address] )    Mesenteric artery stenosis (HCC)    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)    Asymptomatic bilateral carotid artery stenosis     Past Medical History:   Diagnosis Date    Anal fissure     Cardiac disorder     Esophageal reflux     Esophagitis, reflux     Hemorrhoids     Hepatic hemangioma     Last Assessed: 1/13/2015    Herpes zoster     History of colonic polyps     Hypertension     Ischemic colitis ([place] [address] )     Lumbar herniated disc     Malignant neoplasm without specification of site ([place] [address] )     Nephrolithiasis     L   Lithotripsy    Nontoxic single thyroid nodule     Last Assessed: 1/13/2015    Osteoarthritis     Overactive bladder     Raynaud disease     Respiratory system disease     Sjogren's disease ([place] [address] )     Spinal stenosis     PONCHO (stress urinary incontinence, female)     Uterovaginal prolapse     Grade I-II     Social History     Socioeconomic History    Marital status: /Civil Union     Spouse name: Not on file    Number of children: Not on file    Years of education: Not on file    Highest education level: Not on file   Occupational History    Occupation: retired   Social Needs    Financial resource strain: Not on file    Food insecurity:     Worry: Not on file     Inability: Not on file   NumberFour needs:     Medical: Not on file     Non-medical: Not on file   Tobacco Use    Smoking status: Never Smoker    Smokeless tobacco: Never Used   Substance and Sexual Activity    Alcohol use: Yes     Comment: social    Drug use: No    Sexual activity: Not Currently   Lifestyle    Physical activity:     Days per week: Not on file     Minutes per session: Not on file    Stress: Not on file   Relationships    Social connections:     Talks on phone: Not on file     Gets together: Not on file     Attends Nondenominational service: Not on file     Active member of club or organization: Not on file     Attends meetings of clubs or organizations: Not on file     Relationship status: Not on file    Intimate partner violence:     Fear of current or ex partner: Not on file     Emotionally abused: Not on file     Physically abused: Not on file     Forced sexual activity: Not on file   Other Topics Concern    Not on file   Social History Narrative    Activities: golf    Caffeine use    Consumes healthy diet    Daily caffeinated coffee consumption: 1 cup per day    Drinks caffeinated tea: 1 cup per day    Well balanced diet      Family History   Problem Relation Age of Onset    Heart disease Mother     Hypertension Mother     Osteoporosis Mother     Heart disease Father     Hypertension Father     Ulcerative colitis Family     Colon polyps Family      Past Surgical History:   Procedure Laterality Date    APPENDECTOMY  1947    CARDIAC SURGERY      CABG    CATARACT EXTRACTION Bilateral     COLONOSCOPY  2012    Fiberoptic    COLONOSCOPY      Resolved: 2006 - 2012 5 year f/u    CORONARY ANGIOPLASTY WITH STENT PLACEMENT      CORONARY ARTERY BYPASS GRAFT      Resolved: 2012    ESOPHAGOGASTRODUODENOSCOPY  2012    Diagnostic    HEMORROIDECTOMY      KNEE SURGERY      LITHOTRIPSY      Renal    MALIGNANT SKIN LESION EXCISION      Face; Resolved: 2004    CA ESOPHAGOGASTRODUODENOSCOPY TRANSORAL DIAGNOSTIC N/A 4/13/2016    Procedure: EGD AND COLONOSCOPY;  Surgeon: Arnaud Crum MD;  [facility]; Service: Gastroenterology    RENAL ARTERY STENT      SKIN LESION EXCISION      Scalp    SOFT TISSUE TUMOR RESECTION      Shoulder; Resolved: 1995    THROMBOLYSIS      Postoperative Thrombolysis PTCA    TONSILLECTOMY      Resolved: 1944       Current Outpatient Medications:     amLODIPine (NORVASC) 5 mg tablet, Take 1 tablet (5 mg total) by mouth daily, Disp: 90 tablet, Rfl: 1    aspirin (ECOTRIN LOW STRENGTH) 81 mg EC tablet, Take 81 mg by mouth daily  , Disp: , Rfl:     atorvastatin (LIPITOR) 40 mg tablet, Take 1 tablet (40 mg total) by mouth daily, Disp: 90 tablet, Rfl: 3    Cholecalciferol (VITAMIN D3 PO), Take 2,000 Units by mouth daily  , Disp: , Rfl:     Docusate Sodium (COLACE PO), Take by mouth daily  , Disp: , Rfl:     isosorbide mononitrate (IMDUR) 60 mg 24 hr tablet, Take 1 tablet (60 mg total) by mouth daily, Disp: 90 tablet, Rfl: 1    losartan (COZAAR) 25 mg tablet, Take 2 tablets (50 mg total) by mouth daily, Disp: 90 tablet, Rfl: 1    metoprolol succinate (TOPROL-XL) 25 mg 24 hr tablet, TAKE 1 TABLET BY MOUTH EVERY DAY, Disp: 30 tablet, Rfl: 9    multivitamin (THERAGRAN) TABS, Take 1 tablet by mouth daily  , Disp: , Rfl:     nitroglycerin (NITROSTAT) 0 4 mg SL tablet, Place 0 4 mg under the tongue every 5 (five) minutes as needed for chest pain , Disp: , Rfl:     prasugrel (EFFIENT) tablet, Take 10 mg by mouth every other day, Disp: , Rfl:     ranolazine (RANEXA) 500 mg 12 hr tablet, Take 1 tablet (500 mg total) by mouth every 12 (twelve) hours, Disp: 60 tablet, Rfl: 5    torsemide (DEMADEX) 20 mg tablet, Take 20 mg by mouth daily Pt only takes PRN, Disp: , Rfl:   Allergies   Allergen Reactions    Sulfa Antibiotics Anaphylaxis    Formaldehyde     Codeine Palpitations       Labs:not applicable  Imaging: No results found  Review of Systems:  Review of Systems   All other systems reviewed and are negative  Physical Exam:  /68 (BP Location: Right arm, Patient Position: Sitting, Cuff Size: Standard)   Pulse 88   Ht 4' 10" (1 473 m)   Wt 61 2 kg (135 lb)   BMI 28 22 kg/m²   Physical Exam   Constitutional: She is oriented to person, place, and time  She appears well-developed and well-nourished  HENT:   Head: Normocephalic and atraumatic  Eyes: Pupils are equal, round, and reactive to light  Conjunctivae and EOM are normal    Neck: Normal range of motion  Neck supple  Cardiovascular: Normal rate and normal heart sounds  Pulmonary/Chest: Effort normal and breath sounds normal    Neurological: She is alert and oriented to person, place, and time  Skin: Skin is warm and dry  Psychiatric: She has a normal mood and affect     Vitals reviewed  Discussion/Summary:I will continue the patient's present medical regimen  The patient appears well compensated  I have asked the patient to call if there is a problem in the interim otherwise I will see the patient in six months time

## 2019-12-06 ENCOUNTER — OFFICE VISIT (OUTPATIENT)
Dept: CARDIOLOGY CLINIC | Facility: CLINIC | Age: 79
End: 2019-12-06
Payer: MEDICARE

## 2019-12-06 VITALS
HEIGHT: 58 IN | SYSTOLIC BLOOD PRESSURE: 126 MMHG | BODY MASS INDEX: 28.34 KG/M2 | HEART RATE: 88 BPM | WEIGHT: 135 LBS | DIASTOLIC BLOOD PRESSURE: 68 MMHG

## 2019-12-06 DIAGNOSIS — I25.10 CORONARY ARTERIOSCLEROSIS: ICD-10-CM

## 2019-12-06 DIAGNOSIS — E78.00 PURE HYPERCHOLESTEROLEMIA: ICD-10-CM

## 2019-12-06 DIAGNOSIS — Z95.1 S/P CABG (CORONARY ARTERY BYPASS GRAFT): ICD-10-CM

## 2019-12-06 DIAGNOSIS — I10 ESSENTIAL HYPERTENSION: Primary | ICD-10-CM

## 2019-12-06 DIAGNOSIS — Z95.820 S/P PERCUTANEOUS TRANSLUMINAL ANGIOPLASTY (PTA) WITH STENT PLACEMENT: ICD-10-CM

## 2019-12-06 PROCEDURE — 99214 OFFICE O/P EST MOD 30 MIN: CPT | Performed by: INTERNAL MEDICINE

## 2019-12-16 ENCOUNTER — OFFICE VISIT (OUTPATIENT)
Dept: OBGYN CLINIC | Facility: MEDICAL CENTER | Age: 79
End: 2019-12-16
Payer: MEDICARE

## 2019-12-16 VITALS — SYSTOLIC BLOOD PRESSURE: 124 MMHG | BODY MASS INDEX: 28.42 KG/M2 | WEIGHT: 136 LBS | DIASTOLIC BLOOD PRESSURE: 74 MMHG

## 2019-12-16 DIAGNOSIS — N81.9 FEMALE GENITAL PROLAPSE, UNSPECIFIED TYPE: Primary | ICD-10-CM

## 2019-12-16 PROCEDURE — 99213 OFFICE O/P EST LOW 20 MIN: CPT | Performed by: STUDENT IN AN ORGANIZED HEALTH CARE EDUCATION/TRAINING PROGRAM

## 2019-12-16 NOTE — PROGRESS NOTES
Pessary  Date/Time: 12/16/2019 4:00 PM  Performed by: Allan Moore MD  Authorized by: Allan Moore MD     Consent:     Consent obtained:  Verbal    Consent given by:  Patient    Procedural risks discussed:  Bleeding and possible continued pain    Patient questions answered: yes    Indication:     Indication for pessary: uterine prolapse    Pre-procedure:     Pessary procedure type:  Cleaning/check  Problems:     Pessary complications: discomfort, vaginal discharge and unable to move bowels    Procedure:     Pessary type:  Ring w/ support    Patient tolerance of procedure: Tolerated well, no immediate complications  Comments:     Procedure comments:  Patient still with constipation but using meds for it  Does want pessary placed as remembers prolapse was bothersome prior for activities  Discussed surgical intervention including colpocleisis with spinal and patient adamantly declines surgical intervention  Return 4-5 months

## 2020-01-17 DIAGNOSIS — I25.10 CORONARY ARTERIOSCLEROSIS: Primary | ICD-10-CM

## 2020-01-17 RX ORDER — PRASUGREL HCL 10 MG
TABLET ORAL
Qty: 30 TABLET | Refills: 0 | Status: SHIPPED | OUTPATIENT
Start: 2020-01-17 | End: 2020-03-13

## 2020-01-27 ENCOUNTER — OFFICE VISIT (OUTPATIENT)
Dept: FAMILY MEDICINE CLINIC | Facility: MEDICAL CENTER | Age: 80
End: 2020-01-27
Payer: MEDICARE

## 2020-01-27 VITALS
WEIGHT: 137.38 LBS | BODY MASS INDEX: 28.84 KG/M2 | HEART RATE: 70 BPM | SYSTOLIC BLOOD PRESSURE: 128 MMHG | DIASTOLIC BLOOD PRESSURE: 64 MMHG | RESPIRATION RATE: 16 BRPM | HEIGHT: 58 IN

## 2020-01-27 DIAGNOSIS — E78.5 HYPERLIPIDEMIA, UNSPECIFIED HYPERLIPIDEMIA TYPE: ICD-10-CM

## 2020-01-27 DIAGNOSIS — Z23 FLU VACCINE NEED: ICD-10-CM

## 2020-01-27 DIAGNOSIS — K21.9 GASTROESOPHAGEAL REFLUX DISEASE, ESOPHAGITIS PRESENCE NOT SPECIFIED: Chronic | ICD-10-CM

## 2020-01-27 DIAGNOSIS — Z13.29 SCREENING FOR THYROID DISORDER: ICD-10-CM

## 2020-01-27 DIAGNOSIS — I10 ESSENTIAL HYPERTENSION: Primary | Chronic | ICD-10-CM

## 2020-01-27 DIAGNOSIS — K55.9 ISCHEMIC COLITIS (HCC): ICD-10-CM

## 2020-01-27 PROCEDURE — 99214 OFFICE O/P EST MOD 30 MIN: CPT | Performed by: FAMILY MEDICINE

## 2020-01-27 PROCEDURE — 90662 IIV NO PRSV INCREASED AG IM: CPT

## 2020-01-27 PROCEDURE — G0008 ADMIN INFLUENZA VIRUS VAC: HCPCS

## 2020-01-27 NOTE — PROGRESS NOTES
BMI Counseling: Body mass index is 28 71 kg/m²  The BMI is above normal  Nutrition recommendations include decreasing portion sizes and encouraging healthy choices of fruits and vegetables  Nallely Bland is here for 6 month f/u  She is doing well  She says she has urinary burning recently    No frequency, urgency, back pain or fever  She was hospitalized in August for colitis  This was suspected to be ischemic colitis  Outpatient colonoscopy was suggested at 6 weeks but I see no referral and patient did not go  However she did see vascular surgery regarding this and no intervention was recommended at this time for her celiac, 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  She is followed for her peripheral vascular disease as well as her carotid stenosis by vascular surgery  See consult from August   She has surveillance for these problems as well as on anti-platelet and statin therapy  She is currently taking Effient every other day as well as baby aspirin     She saw cardiology in December  No changes were made  Her last colonoscopy was 2016  Showed diverticular disease  O: /64   Pulse 70   Resp 16   Ht 4' 10" (1 473 m)   Wt 62 3 kg (137 lb 6 oz)   BMI 28 71 kg/m²    Physical Exam   Constitutional: She appears well-developed and well-nourished  No distress  Neck: Carotid bruit is not present  No thyromegaly present  Cardiovascular: Normal rate, regular rhythm, normal heart sounds and intact distal pulses  Pulmonary/Chest: Effort normal and breath sounds normal    Abdominal: Soft  Bowel sounds are normal  She exhibits no mass  There is no hepatomegaly  There is no tenderness  Musculoskeletal: She exhibits no edema  Lymphadenopathy:     She has no cervical adenopathy  Assessment  1  S/P ischemic colitis-has had several episodes  Last colonoscopy was 2016 which apparently did not show any polyps  She is followed by vascular surgery for her vascular disease  Encouraged to continue her anti platelet and statin therapy  2   Hypertension-controlled  3  Urinary symptoms-will check UA and culture  4   Hyperlipidemia-due for blood work    Plan  Check urinalysis and culture as above  Will contact Gastroenterology with regard to follow-up at doubt it is needed in light of her age  Recheck 6 months

## 2020-01-28 ENCOUNTER — APPOINTMENT (OUTPATIENT)
Dept: LAB | Facility: MEDICAL CENTER | Age: 80
End: 2020-01-28
Payer: MEDICARE

## 2020-01-28 ENCOUNTER — TELEPHONE (OUTPATIENT)
Dept: GASTROENTEROLOGY | Facility: CLINIC | Age: 80
End: 2020-01-28

## 2020-01-28 ENCOUNTER — TELEPHONE (OUTPATIENT)
Dept: FAMILY MEDICINE CLINIC | Facility: MEDICAL CENTER | Age: 80
End: 2020-01-28

## 2020-01-28 DIAGNOSIS — I10 ESSENTIAL HYPERTENSION: Chronic | ICD-10-CM

## 2020-01-28 DIAGNOSIS — R39.9 URINARY TRACT INFECTION SYMPTOMS: Primary | ICD-10-CM

## 2020-01-28 DIAGNOSIS — K21.9 GASTROESOPHAGEAL REFLUX DISEASE, ESOPHAGITIS PRESENCE NOT SPECIFIED: Chronic | ICD-10-CM

## 2020-01-28 DIAGNOSIS — Z13.29 SCREENING FOR THYROID DISORDER: ICD-10-CM

## 2020-01-28 DIAGNOSIS — I25.118 CORONARY ARTERY DISEASE OF NATIVE HEART WITH STABLE ANGINA PECTORIS, UNSPECIFIED VESSEL OR LESION TYPE (HCC): ICD-10-CM

## 2020-01-28 DIAGNOSIS — E78.5 HYPERLIPIDEMIA, UNSPECIFIED HYPERLIPIDEMIA TYPE: ICD-10-CM

## 2020-01-28 DIAGNOSIS — R39.9 URINARY TRACT INFECTION SYMPTOMS: ICD-10-CM

## 2020-01-28 LAB
ALBUMIN SERPL BCP-MCNC: 3.6 G/DL (ref 3.5–5)
ALP SERPL-CCNC: 91 U/L (ref 46–116)
ALT SERPL W P-5'-P-CCNC: 25 U/L (ref 12–78)
ANION GAP SERPL CALCULATED.3IONS-SCNC: 3 MMOL/L (ref 4–13)
AST SERPL W P-5'-P-CCNC: 15 U/L (ref 5–45)
BACTERIA UR QL AUTO: ABNORMAL /HPF
BASOPHILS # BLD AUTO: 0.03 THOUSANDS/ΜL (ref 0–0.1)
BASOPHILS NFR BLD AUTO: 1 % (ref 0–1)
BILIRUB SERPL-MCNC: 0.94 MG/DL (ref 0.2–1)
BILIRUB UR QL STRIP: NEGATIVE
BUN SERPL-MCNC: 15 MG/DL (ref 5–25)
CALCIUM SERPL-MCNC: 9 MG/DL (ref 8.3–10.1)
CHLORIDE SERPL-SCNC: 105 MMOL/L (ref 100–108)
CHOLEST SERPL-MCNC: 223 MG/DL (ref 50–200)
CLARITY UR: ABNORMAL
CO2 SERPL-SCNC: 27 MMOL/L (ref 21–32)
COLOR UR: YELLOW
CREAT SERPL-MCNC: 0.84 MG/DL (ref 0.6–1.3)
EOSINOPHIL # BLD AUTO: 0.17 THOUSAND/ΜL (ref 0–0.61)
EOSINOPHIL NFR BLD AUTO: 4 % (ref 0–6)
ERYTHROCYTE [DISTWIDTH] IN BLOOD BY AUTOMATED COUNT: 12.7 % (ref 11.6–15.1)
GFR SERPL CREATININE-BSD FRML MDRD: 66 ML/MIN/1.73SQ M
GLUCOSE P FAST SERPL-MCNC: 94 MG/DL (ref 65–99)
GLUCOSE UR STRIP-MCNC: NEGATIVE MG/DL
HCT VFR BLD AUTO: 41.4 % (ref 34.8–46.1)
HDLC SERPL-MCNC: 61 MG/DL
HGB BLD-MCNC: 13.2 G/DL (ref 11.5–15.4)
HGB UR QL STRIP.AUTO: ABNORMAL
HYALINE CASTS #/AREA URNS LPF: ABNORMAL /LPF
IMM GRANULOCYTES # BLD AUTO: 0.01 THOUSAND/UL (ref 0–0.2)
IMM GRANULOCYTES NFR BLD AUTO: 0 % (ref 0–2)
KETONES UR STRIP-MCNC: NEGATIVE MG/DL
LDLC SERPL CALC-MCNC: 143 MG/DL (ref 0–100)
LEUKOCYTE ESTERASE UR QL STRIP: ABNORMAL
LYMPHOCYTES # BLD AUTO: 1.05 THOUSANDS/ΜL (ref 0.6–4.47)
LYMPHOCYTES NFR BLD AUTO: 23 % (ref 14–44)
MCH RBC QN AUTO: 30.1 PG (ref 26.8–34.3)
MCHC RBC AUTO-ENTMCNC: 31.9 G/DL (ref 31.4–37.4)
MCV RBC AUTO: 94 FL (ref 82–98)
MONOCYTES # BLD AUTO: 0.44 THOUSAND/ΜL (ref 0.17–1.22)
MONOCYTES NFR BLD AUTO: 10 % (ref 4–12)
NEUTROPHILS # BLD AUTO: 2.94 THOUSANDS/ΜL (ref 1.85–7.62)
NEUTS SEG NFR BLD AUTO: 62 % (ref 43–75)
NITRITE UR QL STRIP: POSITIVE
NON-SQ EPI CELLS URNS QL MICRO: ABNORMAL /HPF
NONHDLC SERPL-MCNC: 162 MG/DL
NRBC BLD AUTO-RTO: 0 /100 WBCS
PH UR STRIP.AUTO: 6 [PH]
PLATELET # BLD AUTO: 220 THOUSANDS/UL (ref 149–390)
PMV BLD AUTO: 10.4 FL (ref 8.9–12.7)
POTASSIUM SERPL-SCNC: 4.1 MMOL/L (ref 3.5–5.3)
PROT SERPL-MCNC: 7.3 G/DL (ref 6.4–8.2)
PROT UR STRIP-MCNC: NEGATIVE MG/DL
RBC # BLD AUTO: 4.39 MILLION/UL (ref 3.81–5.12)
RBC #/AREA URNS AUTO: ABNORMAL /HPF
SODIUM SERPL-SCNC: 135 MMOL/L (ref 136–145)
SP GR UR STRIP.AUTO: 1.02 (ref 1–1.03)
TRIGL SERPL-MCNC: 93 MG/DL
TSH SERPL DL<=0.05 MIU/L-ACNC: 1.11 UIU/ML (ref 0.36–3.74)
UROBILINOGEN UR QL STRIP.AUTO: 0.2 E.U./DL
WBC # BLD AUTO: 4.64 THOUSAND/UL (ref 4.31–10.16)
WBC #/AREA URNS AUTO: ABNORMAL /HPF

## 2020-01-28 PROCEDURE — 80053 COMPREHEN METABOLIC PANEL: CPT

## 2020-01-28 PROCEDURE — 85025 COMPLETE CBC W/AUTO DIFF WBC: CPT

## 2020-01-28 PROCEDURE — 87186 SC STD MICRODIL/AGAR DIL: CPT

## 2020-01-28 PROCEDURE — 80061 LIPID PANEL: CPT

## 2020-01-28 PROCEDURE — 87086 URINE CULTURE/COLONY COUNT: CPT

## 2020-01-28 PROCEDURE — 84443 ASSAY THYROID STIM HORMONE: CPT

## 2020-01-28 PROCEDURE — 87077 CULTURE AEROBIC IDENTIFY: CPT

## 2020-01-28 PROCEDURE — 81001 URINALYSIS AUTO W/SCOPE: CPT

## 2020-01-28 PROCEDURE — 36415 COLL VENOUS BLD VENIPUNCTURE: CPT

## 2020-01-28 NOTE — TELEPHONE ENCOUNTER
Dr forrest calling to clarify if patient is due for Colonoscopy, no indication in patients chart  Please advise, Thank You

## 2020-01-28 NOTE — TELEPHONE ENCOUNTER
Called and relayed this to patient made her apt with Dr Aaliyah Jaimes 3/18/20 at 3 pm to have a colon consult

## 2020-01-28 NOTE — TELEPHONE ENCOUNTER
----- Message from Linwood Huggins MD sent at 1/27/2020  9:29 PM EST -----  Patient was seen at the office today and she had urinary symptoms  Forgot to get a urinalysis and culture from her  advise she come over to the lab on Tuesday  Also please call Cristina Linares  office and find out if she she needs another colonoscopy  She has a history of ischemic colitis however in light of her age I do not know that he wants to do another 1    I do not see any recommendation on the chart at her last colonoscopy  in 2016

## 2020-01-28 NOTE — TELEPHONE ENCOUNTER
Please advise, I see that she was hospitalized in August 2019 with ischemic colitis and she was recommended to have a colonoscopy in 6-8 weeks after that in order to exclude any underlying lesions, as she had not had a colonoscopy prior to that since 2016  So I would agree that she is due  Given her age and other medical issues though, I think it would make sense to have her seen in the office prior to scheduling so we can discuss about risks and otherwise evaluate her preoperatively    Thanks

## 2020-01-30 LAB — BACTERIA UR CULT: ABNORMAL

## 2020-01-31 DIAGNOSIS — R39.9 SYMPTOMS OF URINARY TRACT INFECTION: Primary | ICD-10-CM

## 2020-01-31 RX ORDER — NITROFURANTOIN 25; 75 MG/1; MG/1
100 CAPSULE ORAL 2 TIMES DAILY
Qty: 10 CAPSULE | Refills: 0 | Status: SHIPPED | OUTPATIENT
Start: 2020-01-31 | End: 2020-02-05

## 2020-02-18 ENCOUNTER — HOSPITAL ENCOUNTER (OUTPATIENT)
Dept: RADIOLOGY | Facility: MEDICAL CENTER | Age: 80
Discharge: HOME | End: 2020-02-18
Payer: MEDICARE

## 2020-02-18 DIAGNOSIS — I65.23 ASYMPTOMATIC BILATERAL CAROTID ARTERY STENOSIS: ICD-10-CM

## 2020-02-18 PROCEDURE — 93880 EXTRACRANIAL BILAT STUDY: CPT

## 2020-02-19 PROCEDURE — 93880 EXTRACRANIAL BILAT STUDY: CPT | Performed by: SURGERY

## 2020-03-13 DIAGNOSIS — I25.10 CORONARY ARTERIOSCLEROSIS: ICD-10-CM

## 2020-03-13 RX ORDER — PRASUGREL HCL 10 MG
TABLET ORAL
Qty: 30 TABLET | Refills: 0 | Status: SHIPPED | OUTPATIENT
Start: 2020-03-13 | End: 2020-05-04

## 2020-05-04 DIAGNOSIS — I25.10 CORONARY ARTERIOSCLEROSIS: ICD-10-CM

## 2020-05-04 RX ORDER — PRASUGREL HCL 10 MG
TABLET ORAL
Qty: 30 TABLET | Refills: 0 | Status: SHIPPED | OUTPATIENT
Start: 2020-05-04 | End: 2020-07-06

## 2020-05-30 ENCOUNTER — TELEPHONE (OUTPATIENT)
Dept: OTHER | Facility: OTHER | Age: 80
End: 2020-05-30

## 2020-06-02 ENCOUNTER — OFFICE VISIT (OUTPATIENT)
Dept: FAMILY MEDICINE CLINIC | Facility: MEDICAL CENTER | Age: 80
End: 2020-06-02
Payer: MEDICARE

## 2020-06-02 ENCOUNTER — APPOINTMENT (OUTPATIENT)
Dept: RADIOLOGY | Facility: MEDICAL CENTER | Age: 80
End: 2020-06-02
Payer: MEDICARE

## 2020-06-02 VITALS
HEART RATE: 68 BPM | TEMPERATURE: 96.7 F | WEIGHT: 138.8 LBS | HEIGHT: 58 IN | SYSTOLIC BLOOD PRESSURE: 132 MMHG | RESPIRATION RATE: 16 BRPM | BODY MASS INDEX: 29.14 KG/M2 | DIASTOLIC BLOOD PRESSURE: 78 MMHG

## 2020-06-02 DIAGNOSIS — M25.552 LATERAL PAIN OF LEFT HIP: Primary | ICD-10-CM

## 2020-06-02 DIAGNOSIS — M25.552 LATERAL PAIN OF LEFT HIP: ICD-10-CM

## 2020-06-02 PROCEDURE — 1036F TOBACCO NON-USER: CPT | Performed by: FAMILY MEDICINE

## 2020-06-02 PROCEDURE — 4040F PNEUMOC VAC/ADMIN/RCVD: CPT | Performed by: FAMILY MEDICINE

## 2020-06-02 PROCEDURE — 99213 OFFICE O/P EST LOW 20 MIN: CPT | Performed by: FAMILY MEDICINE

## 2020-06-02 PROCEDURE — 1160F RVW MEDS BY RX/DR IN RCRD: CPT | Performed by: FAMILY MEDICINE

## 2020-06-02 PROCEDURE — 3075F SYST BP GE 130 - 139MM HG: CPT | Performed by: FAMILY MEDICINE

## 2020-06-02 PROCEDURE — 3078F DIAST BP <80 MM HG: CPT | Performed by: FAMILY MEDICINE

## 2020-06-02 PROCEDURE — 73502 X-RAY EXAM HIP UNI 2-3 VIEWS: CPT

## 2020-06-02 PROCEDURE — 3008F BODY MASS INDEX DOCD: CPT | Performed by: FAMILY MEDICINE

## 2020-06-03 ENCOUNTER — OFFICE VISIT (OUTPATIENT)
Dept: OBGYN CLINIC | Facility: MEDICAL CENTER | Age: 80
End: 2020-06-03
Payer: MEDICARE

## 2020-06-03 VITALS
DIASTOLIC BLOOD PRESSURE: 75 MMHG | HEART RATE: 82 BPM | TEMPERATURE: 96.1 F | HEIGHT: 60 IN | BODY MASS INDEX: 27.29 KG/M2 | SYSTOLIC BLOOD PRESSURE: 123 MMHG | WEIGHT: 139 LBS

## 2020-06-03 DIAGNOSIS — M70.62 GREATER TROCHANTERIC BURSITIS, LEFT: Primary | ICD-10-CM

## 2020-06-03 DIAGNOSIS — M25.552 LATERAL PAIN OF LEFT HIP: ICD-10-CM

## 2020-06-03 PROCEDURE — 3078F DIAST BP <80 MM HG: CPT | Performed by: ORTHOPAEDIC SURGERY

## 2020-06-03 PROCEDURE — 99204 OFFICE O/P NEW MOD 45 MIN: CPT | Performed by: ORTHOPAEDIC SURGERY

## 2020-06-03 PROCEDURE — 4040F PNEUMOC VAC/ADMIN/RCVD: CPT | Performed by: ORTHOPAEDIC SURGERY

## 2020-06-03 PROCEDURE — 3008F BODY MASS INDEX DOCD: CPT | Performed by: ORTHOPAEDIC SURGERY

## 2020-06-03 PROCEDURE — 1036F TOBACCO NON-USER: CPT | Performed by: ORTHOPAEDIC SURGERY

## 2020-06-03 PROCEDURE — 1160F RVW MEDS BY RX/DR IN RCRD: CPT | Performed by: ORTHOPAEDIC SURGERY

## 2020-06-03 PROCEDURE — 20610 DRAIN/INJ JOINT/BURSA W/O US: CPT | Performed by: ORTHOPAEDIC SURGERY

## 2020-06-03 PROCEDURE — 3074F SYST BP LT 130 MM HG: CPT | Performed by: ORTHOPAEDIC SURGERY

## 2020-06-03 RX ORDER — METHYLPREDNISOLONE ACETATE 40 MG/ML
2 INJECTION, SUSPENSION INTRA-ARTICULAR; INTRALESIONAL; INTRAMUSCULAR; SOFT TISSUE
Status: COMPLETED | OUTPATIENT
Start: 2020-06-03 | End: 2020-06-03

## 2020-06-03 RX ORDER — BUPIVACAINE HYDROCHLORIDE 5 MG/ML
2 INJECTION, SOLUTION EPIDURAL; INTRACAUDAL
Status: COMPLETED | OUTPATIENT
Start: 2020-06-03 | End: 2020-06-03

## 2020-06-03 RX ADMIN — METHYLPREDNISOLONE ACETATE 2 ML: 40 INJECTION, SUSPENSION INTRA-ARTICULAR; INTRALESIONAL; INTRAMUSCULAR; SOFT TISSUE at 16:19

## 2020-06-03 RX ADMIN — BUPIVACAINE HYDROCHLORIDE 2 ML: 5 INJECTION, SOLUTION EPIDURAL; INTRACAUDAL at 16:19

## 2020-06-05 DIAGNOSIS — I10 HYPERTENSION, UNSPECIFIED TYPE: ICD-10-CM

## 2020-06-06 RX ORDER — LOSARTAN POTASSIUM 25 MG/1
TABLET ORAL
Qty: 90 TABLET | Refills: 1 | Status: SHIPPED | OUTPATIENT
Start: 2020-06-06 | End: 2020-07-06

## 2020-06-16 ENCOUNTER — TELEMEDICINE (OUTPATIENT)
Dept: CARDIOLOGY CLINIC | Facility: CLINIC | Age: 80
End: 2020-06-16
Payer: MEDICARE

## 2020-06-16 ENCOUNTER — OFFICE VISIT (OUTPATIENT)
Dept: OBGYN CLINIC | Facility: MEDICAL CENTER | Age: 80
End: 2020-06-16
Payer: MEDICARE

## 2020-06-16 VITALS — SYSTOLIC BLOOD PRESSURE: 114 MMHG | BODY MASS INDEX: 28.59 KG/M2 | WEIGHT: 136.8 LBS | DIASTOLIC BLOOD PRESSURE: 68 MMHG

## 2020-06-16 VITALS
BODY MASS INDEX: 28.55 KG/M2 | DIASTOLIC BLOOD PRESSURE: 80 MMHG | HEIGHT: 58 IN | HEART RATE: 77 BPM | WEIGHT: 136 LBS | SYSTOLIC BLOOD PRESSURE: 182 MMHG

## 2020-06-16 DIAGNOSIS — I10 ESSENTIAL HYPERTENSION: Primary | ICD-10-CM

## 2020-06-16 DIAGNOSIS — I25.10 CORONARY ARTERIOSCLEROSIS: ICD-10-CM

## 2020-06-16 DIAGNOSIS — Z95.1 S/P CABG (CORONARY ARTERY BYPASS GRAFT): ICD-10-CM

## 2020-06-16 DIAGNOSIS — Z46.89 PESSARY MAINTENANCE: Primary | ICD-10-CM

## 2020-06-16 DIAGNOSIS — E78.00 PURE HYPERCHOLESTEROLEMIA: ICD-10-CM

## 2020-06-16 PROCEDURE — 1036F TOBACCO NON-USER: CPT | Performed by: STUDENT IN AN ORGANIZED HEALTH CARE EDUCATION/TRAINING PROGRAM

## 2020-06-16 PROCEDURE — 3078F DIAST BP <80 MM HG: CPT | Performed by: STUDENT IN AN ORGANIZED HEALTH CARE EDUCATION/TRAINING PROGRAM

## 2020-06-16 PROCEDURE — 3074F SYST BP LT 130 MM HG: CPT | Performed by: STUDENT IN AN ORGANIZED HEALTH CARE EDUCATION/TRAINING PROGRAM

## 2020-06-16 PROCEDURE — 3008F BODY MASS INDEX DOCD: CPT | Performed by: STUDENT IN AN ORGANIZED HEALTH CARE EDUCATION/TRAINING PROGRAM

## 2020-06-16 PROCEDURE — 99213 OFFICE O/P EST LOW 20 MIN: CPT | Performed by: STUDENT IN AN ORGANIZED HEALTH CARE EDUCATION/TRAINING PROGRAM

## 2020-06-16 PROCEDURE — 4040F PNEUMOC VAC/ADMIN/RCVD: CPT | Performed by: STUDENT IN AN ORGANIZED HEALTH CARE EDUCATION/TRAINING PROGRAM

## 2020-06-16 PROCEDURE — 99442 PR PHYS/QHP TELEPHONE EVALUATION 11-20 MIN: CPT | Performed by: INTERNAL MEDICINE

## 2020-06-16 PROCEDURE — 1160F RVW MEDS BY RX/DR IN RCRD: CPT | Performed by: STUDENT IN AN ORGANIZED HEALTH CARE EDUCATION/TRAINING PROGRAM

## 2020-06-16 RX ORDER — SIMVASTATIN 40 MG
40 TABLET ORAL
Qty: 90 TABLET | Refills: 3 | Status: SHIPPED | OUTPATIENT
Start: 2020-06-16 | End: 2020-12-05 | Stop reason: HOSPADM

## 2020-06-25 ENCOUNTER — OFFICE VISIT (OUTPATIENT)
Dept: FAMILY MEDICINE CLINIC | Facility: MEDICAL CENTER | Age: 80
End: 2020-06-25
Payer: MEDICARE

## 2020-06-25 ENCOUNTER — TELEPHONE (OUTPATIENT)
Dept: FAMILY MEDICINE CLINIC | Facility: MEDICAL CENTER | Age: 80
End: 2020-06-25

## 2020-06-25 VITALS
TEMPERATURE: 98.5 F | WEIGHT: 138.8 LBS | BODY MASS INDEX: 29.14 KG/M2 | HEART RATE: 76 BPM | DIASTOLIC BLOOD PRESSURE: 60 MMHG | HEIGHT: 58 IN | SYSTOLIC BLOOD PRESSURE: 90 MMHG | RESPIRATION RATE: 16 BRPM

## 2020-06-25 DIAGNOSIS — I74.3 EMBOLISM AND THROMBOSIS OF ARTERIES OF THE LOWER EXTREMITIES (HCC): ICD-10-CM

## 2020-06-25 DIAGNOSIS — R39.9 URINARY SYMPTOM OR SIGN: Primary | ICD-10-CM

## 2020-06-25 DIAGNOSIS — R39.9 SYMPTOMS OF URINARY TRACT INFECTION: ICD-10-CM

## 2020-06-25 LAB
SL AMB  POCT GLUCOSE, UA: ABNORMAL
SL AMB LEUKOCYTE ESTERASE,UA: ABNORMAL
SL AMB POCT BILIRUBIN,UA: ABNORMAL
SL AMB POCT BLOOD,UA: ABNORMAL
SL AMB POCT KETONES,UA: ABNORMAL
SL AMB POCT NITRITE,UA: ABNORMAL
SL AMB POCT PH,UA: 5
SL AMB POCT SPECIFIC GRAVITY,UA: 1.03
SL AMB POCT URINE PROTEIN: ABNORMAL
SL AMB POCT UROBILINOGEN: ABNORMAL

## 2020-06-25 PROCEDURE — 99213 OFFICE O/P EST LOW 20 MIN: CPT | Performed by: FAMILY MEDICINE

## 2020-06-25 PROCEDURE — 87086 URINE CULTURE/COLONY COUNT: CPT | Performed by: FAMILY MEDICINE

## 2020-06-25 PROCEDURE — 4040F PNEUMOC VAC/ADMIN/RCVD: CPT | Performed by: FAMILY MEDICINE

## 2020-06-25 PROCEDURE — 1036F TOBACCO NON-USER: CPT | Performed by: FAMILY MEDICINE

## 2020-06-25 PROCEDURE — 87186 SC STD MICRODIL/AGAR DIL: CPT | Performed by: FAMILY MEDICINE

## 2020-06-25 PROCEDURE — 3008F BODY MASS INDEX DOCD: CPT | Performed by: FAMILY MEDICINE

## 2020-06-25 PROCEDURE — 3078F DIAST BP <80 MM HG: CPT | Performed by: FAMILY MEDICINE

## 2020-06-25 PROCEDURE — 3074F SYST BP LT 130 MM HG: CPT | Performed by: FAMILY MEDICINE

## 2020-06-25 PROCEDURE — 87077 CULTURE AEROBIC IDENTIFY: CPT | Performed by: FAMILY MEDICINE

## 2020-06-25 PROCEDURE — 81002 URINALYSIS NONAUTO W/O SCOPE: CPT | Performed by: FAMILY MEDICINE

## 2020-06-25 PROCEDURE — 1160F RVW MEDS BY RX/DR IN RCRD: CPT | Performed by: FAMILY MEDICINE

## 2020-06-25 RX ORDER — NITROFURANTOIN 25; 75 MG/1; MG/1
100 CAPSULE ORAL 2 TIMES DAILY
Qty: 10 CAPSULE | Refills: 0 | Status: SHIPPED | OUTPATIENT
Start: 2020-06-25 | End: 2020-06-30

## 2020-06-27 LAB — BACTERIA UR CULT: ABNORMAL

## 2020-06-29 ENCOUNTER — TELEPHONE (OUTPATIENT)
Dept: FAMILY MEDICINE CLINIC | Facility: MEDICAL CENTER | Age: 80
End: 2020-06-29

## 2020-07-06 DIAGNOSIS — I25.10 CORONARY ARTERIOSCLEROSIS: ICD-10-CM

## 2020-07-06 DIAGNOSIS — I10 HYPERTENSION, UNSPECIFIED TYPE: ICD-10-CM

## 2020-07-06 DIAGNOSIS — I25.10 CORONARY ARTERY DISEASE INVOLVING NATIVE HEART WITHOUT ANGINA PECTORIS, UNSPECIFIED VESSEL OR LESION TYPE: ICD-10-CM

## 2020-07-06 RX ORDER — PRASUGREL HCL 10 MG
TABLET ORAL
Qty: 30 TABLET | Refills: 0 | Status: SHIPPED | OUTPATIENT
Start: 2020-07-06 | End: 2020-09-28

## 2020-07-06 RX ORDER — LOSARTAN POTASSIUM 25 MG/1
TABLET ORAL
Qty: 60 TABLET | Refills: 2 | Status: SHIPPED | OUTPATIENT
Start: 2020-07-06 | End: 2020-07-07

## 2020-07-06 RX ORDER — METOPROLOL SUCCINATE 25 MG/1
TABLET, EXTENDED RELEASE ORAL
Qty: 30 TABLET | Refills: 9 | Status: SHIPPED | OUTPATIENT
Start: 2020-07-06 | End: 2020-08-05 | Stop reason: SDUPTHER

## 2020-07-07 RX ORDER — CANDESARTAN 8 MG/1
TABLET ORAL
Qty: 90 TABLET | Refills: 3 | Status: SHIPPED | OUTPATIENT
Start: 2020-07-07 | End: 2020-08-06

## 2020-08-04 ENCOUNTER — HOSPITAL ENCOUNTER (OUTPATIENT)
Dept: NON INVASIVE DIAGNOSTICS | Facility: CLINIC | Age: 80
Discharge: HOME | End: 2020-08-04
Payer: MEDICARE

## 2020-08-04 DIAGNOSIS — K55.1 MESENTERIC ARTERY STENOSIS (HCC): ICD-10-CM

## 2020-08-04 DIAGNOSIS — I70.213 ATHEROSCLEROSIS OF NATIVE ARTERY OF BOTH LOWER EXTREMITIES WITH INTERMITTENT CLAUDICATION (HCC): ICD-10-CM

## 2020-08-04 PROCEDURE — 93923 UPR/LXTR ART STDY 3+ LVLS: CPT

## 2020-08-04 PROCEDURE — 93975 VASCULAR STUDY: CPT

## 2020-08-04 PROCEDURE — 93975 VASCULAR STUDY: CPT | Performed by: SURGERY

## 2020-08-04 PROCEDURE — 93922 UPR/L XTREMITY ART 2 LEVELS: CPT | Performed by: SURGERY

## 2020-08-04 PROCEDURE — 93925 LOWER EXTREMITY STUDY: CPT | Performed by: SURGERY

## 2020-08-04 PROCEDURE — 93925 LOWER EXTREMITY STUDY: CPT

## 2020-08-05 DIAGNOSIS — I25.10 CORONARY ARTERY DISEASE INVOLVING NATIVE HEART WITHOUT ANGINA PECTORIS, UNSPECIFIED VESSEL OR LESION TYPE: ICD-10-CM

## 2020-08-05 DIAGNOSIS — I25.10 CORONARY ARTERIOSCLEROSIS: ICD-10-CM

## 2020-08-05 RX ORDER — ISOSORBIDE MONONITRATE 60 MG/1
60 TABLET, EXTENDED RELEASE ORAL DAILY
Qty: 90 TABLET | Refills: 1 | Status: SHIPPED | OUTPATIENT
Start: 2020-08-05 | End: 2020-12-05 | Stop reason: HOSPADM

## 2020-08-05 RX ORDER — METOPROLOL SUCCINATE 25 MG/1
25 TABLET, EXTENDED RELEASE ORAL DAILY
Qty: 90 TABLET | Refills: 1 | Status: SHIPPED | OUTPATIENT
Start: 2020-08-05 | End: 2020-12-05 | Stop reason: HOSPADM

## 2020-08-06 ENCOUNTER — OFFICE VISIT (OUTPATIENT)
Dept: VASCULAR SURGERY | Facility: CLINIC | Age: 80
End: 2020-08-06
Payer: MEDICARE

## 2020-08-06 VITALS
DIASTOLIC BLOOD PRESSURE: 78 MMHG | BODY MASS INDEX: 27.09 KG/M2 | RESPIRATION RATE: 16 BRPM | SYSTOLIC BLOOD PRESSURE: 132 MMHG | HEIGHT: 60 IN | HEART RATE: 76 BPM | TEMPERATURE: 98.7 F | WEIGHT: 138 LBS

## 2020-08-06 DIAGNOSIS — I70.213 ATHEROSCLEROSIS OF NATIVE ARTERY OF BOTH LOWER EXTREMITIES WITH INTERMITTENT CLAUDICATION (HCC): Primary | ICD-10-CM

## 2020-08-06 DIAGNOSIS — I10 HYPERTENSION, UNSPECIFIED TYPE: ICD-10-CM

## 2020-08-06 PROCEDURE — 99213 OFFICE O/P EST LOW 20 MIN: CPT | Performed by: SURGERY

## 2020-08-06 RX ORDER — LOSARTAN POTASSIUM 25 MG/1
TABLET ORAL
Qty: 60 TABLET | Refills: 2 | Status: SHIPPED | OUTPATIENT
Start: 2020-08-06 | End: 2020-12-05 | Stop reason: HOSPADM

## 2020-08-06 NOTE — PATIENT INSTRUCTIONS
Presents with complaints of short distance claudication approximately half a block bilateral lower extremities  She also notes that she gets chest pain likely anginal when she gets about a half a block  She has minimal to moderate swelling by the end of the day and has no issue of ischemic rest pain or tissue loss  We had a long discussion regarding the benign nature of claudication and that her risks are extremely low but she must examine her feet on a daily basis and be very careful while trimming her toenails  Plan: They are to call our office should she present with ischemic rest pain or tissue loss  Follow-up lower extremity arterial study in 1 year

## 2020-08-06 NOTE — LETTER
August 6, 2020     Roseann Butler MD  [facility] [address]    Patient: Jessa Andrade   YOB: 1940   Date of Visit: 8/6/2020       Dear Dr Tammy Steward: Thank you for referring Yovani Moreno to me for evaluation  Below are my notes for this consultation  If you have questions, please do not hesitate to call me  I look forward to following your patient along with you           Sincerely,        Lenka Tang MD        CC: No Recipients

## 2020-08-06 NOTE — PROGRESS NOTES
Assessment/Plan:    Presents with complaints of short distance claudication approximately half a block bilateral lower extremities  She also notes that she gets chest pain likely anginal when she gets about a half a block  She has minimal to moderate swelling by the end of the day and has no issue of ischemic rest pain or tissue loss  We had a long discussion regarding the benign nature of claudication and that her risks are extremely low but she must examine her feet on a daily basis and be very careful while trimming her toenails  Plan: They are to call our office should she present with ischemic rest pain or tissue loss  Follow-up lower extremity arterial study in 1 year  Diagnoses and all orders for this visit:    Atherosclerosis of native artery of both lower extremities with intermittent claudication (HCC)  -     VAS lower limb arterial duplex, complete bilateral; Future        Subjective:      Patient ID: Jonathan Haro is a [de-identified] y o  female  Patient had a NOEMI and celiac duplex 8/4/20  Pt states she can walk 1/2 block before getting BLE aching calf pain  Pt rest for 1-2 minutes before pain subsides  Pt denies pain at night or with leg elevation  Pt denies open wounds  Pt also gets chest pain when walking 1/2 block  Pt has occasional back pain and  abdominal pain after eating  Pt denies change in appetite  Pt is on ASA 81 mg, Effient, and Simvastatin  HPI    The following portions of the patient's history were reviewed and updated as appropriate: allergies, current medications, past family history, past medical history, past social history, past surgical history and problem list     Review of Systems   Constitutional: Negative for appetite change, chills, fatigue and fever  HENT: Negative  Eyes: Negative  Respiratory: Positive for shortness of breath  Cardiovascular: Positive for chest pain  Gastrointestinal: Positive for abdominal pain (after eating)   Negative for nausea and vomiting  Endocrine: Negative  Genitourinary: Negative  Musculoskeletal: Positive for arthralgias and back pain  Leg pain when walking   Skin: Negative  Allergic/Immunologic: Negative  Neurological: Negative  Hematological: Bruises/bleeds easily  Psychiatric/Behavioral: The patient is nervous/anxious  Objective: There were no vitals taken for this visit  @[facility]@          Oriented x3 no evidence of clinical depression  Eyes:  Sclera non-icteric    Skin: normal without evidence of inflammation    Neck is supple carotid pulses equal bilaterally no bruits heard    Chest lungs clear, heart regular rhythm  Abdomen soft nontender no evidence of pulsatile masses  Pulses are palpable bilateral Femoral     Neurological exam intact cranial nerves 2-12 grossly intact no gross motor sensory deficits detected  Imaging viewed and reviewed with Patient    I have reviewed and made appropriate changes to the review of systems input by the medical assistant      Vitals:    08/06/20 1545   BP: 132/78   BP Location: Right arm   Patient Position: Sitting   Pulse: 76   Resp: 16   Temp: 98 7 °F (37 1 °C)   TempSrc: Tympanic   Weight: 62 6 kg (138 lb)   Height: 5' (1 524 m)       Patient Active Problem List   Diagnosis    CAD (coronary artery disease)    Essential hypertension    GERD (gastroesophageal reflux disease)    Colitis    Hyperlipidemia    Uterovaginal prolapse    Ischemic colitis ([address] )    Mesenteric artery stenosis (HCC)    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)    Asymptomatic bilateral carotid artery stenosis    Embolism and thrombosis of arteries of the lower extremities ([address] )       Past Surgical History:   Procedure Laterality Date    APPENDECTOMY  1947    CARDIAC SURGERY      CABG    CATARACT EXTRACTION Bilateral     COLONOSCOPY  2012    Fiberoptic    COLONOSCOPY      Resolved: 2006 - 2012 5 year f/u    CORONARY ANGIOPLASTY WITH STENT PLACEMENT      CORONARY ARTERY BYPASS GRAFT      Resolved: 2012    ESOPHAGOGASTRODUODENOSCOPY  2012    Diagnostic    HEMORROIDECTOMY      KNEE SURGERY      LITHOTRIPSY      Renal    MALIGNANT SKIN LESION EXCISION      Face; Resolved: 2004    MO ESOPHAGOGASTRODUODENOSCOPY TRANSORAL DIAGNOSTIC N/A 4/13/2016    Procedure: EGD AND COLONOSCOPY;  Surgeon: Marcia Farias MD;  [facility];   Service: Gastroenterology    RENAL ARTERY STENT      SKIN LESION EXCISION      Scalp    SOFT TISSUE TUMOR RESECTION      Shoulder; Resolved: 1995    THROMBOLYSIS      Postoperative Thrombolysis PTCA    TONSILLECTOMY      Resolved: 1944       Family History   Problem Relation Age of Onset    Heart disease Mother     Hypertension Mother     Osteoporosis Mother     Heart disease Father     Hypertension Father     Ulcerative colitis Family     Colon polyps Family        Social History     Socioeconomic History    Marital status: /Civil Union     Spouse name: Not on file    Number of children: Not on file    Years of education: Not on file    Highest education level: Not on file   Occupational History    Occupation: retired   Social Needs    Financial resource strain: Not on file    Food insecurity     Worry: Not on file     Inability: Not on file   [employer] needs     Medical: Not on file     Non-medical: Not on file   Tobacco Use    Smoking status: Never Smoker    Smokeless tobacco: Never Used   Substance and Sexual Activity    Alcohol use: Yes     Comment: social    Drug use: No    Sexual activity: Not Currently   Lifestyle    Physical activity     Days per week: Not on file     Minutes per session: Not on file    Stress: Not on file   Relationships    Social connections     Talks on phone: Not on file     Gets together: Not on file     Attends Denominational service: Not on file     Active member of club or organization: Not on file     Attends meetings of clubs or organizations: Not on file     Relationship status: Not on file    Intimate partner violence     Fear of current or ex partner: Not on file     Emotionally abused: Not on file     Physically abused: Not on file     Forced sexual activity: Not on file   Other Topics Concern    Not on file   Social History Narrative    Activities: golf    Caffeine use    Consumes healthy diet    Daily caffeinated coffee consumption: 1 cup per day    Drinks caffeinated tea: 1 cup per day    Well balanced diet       Allergies   Allergen Reactions    Sulfa Antibiotics Anaphylaxis    Formaldehyde     Codeine Palpitations         Current Outpatient Medications:     amLODIPine (NORVASC) 5 mg tablet, Take 1 tablet (5 mg total) by mouth daily, Disp: 90 tablet, Rfl: 1    aspirin (ECOTRIN LOW STRENGTH) 81 mg EC tablet, Take 81 mg by mouth daily  , Disp: , Rfl:     Cholecalciferol (VITAMIN D3 PO), Take 2,000 Units by mouth daily  , Disp: , Rfl:     Docusate Sodium (COLACE PO), Take by mouth daily  , Disp: , Rfl:     EFFIENT tablet, TAKE 1 TABLET BY MOUTH EVERY OTHER DAY, Disp: 30 tablet, Rfl: 0    isosorbide mononitrate (IMDUR) 60 mg 24 hr tablet, Take 1 tablet (60 mg total) by mouth daily, Disp: 90 tablet, Rfl: 1    losartan (COZAAR) 25 mg tablet, TAKE 2 TABLETS BY MOUTH EVERY DAY, Disp: 60 tablet, Rfl: 2    metoprolol succinate (TOPROL-XL) 25 mg 24 hr tablet, Take 1 tablet (25 mg total) by mouth daily, Disp: 90 tablet, Rfl: 1    multivitamin (THERAGRAN) TABS, Take 1 tablet by mouth daily  , Disp: , Rfl:     nitroglycerin (NITROSTAT) 0 4 mg SL tablet, Place 0 4 mg under the tongue every 5 (five) minutes as needed for chest pain , Disp: , Rfl:     ranolazine (RANEXA) 500 mg 12 hr tablet, Take 1 tablet (500 mg total) by mouth every 12 (twelve) hours, Disp: 60 tablet, Rfl: 5    simvastatin (ZOCOR) 40 mg tablet, Take 1 tablet (40 mg total) by mouth daily at bedtime, Disp: 90 tablet, Rfl: 3    torsemide (DEMADEX) 20 mg tablet, Take 20 mg by mouth daily Pt only takes PRN, Disp: , Rfl:

## 2020-09-22 ENCOUNTER — APPOINTMENT (OUTPATIENT)
Dept: LAB | Facility: MEDICAL CENTER | Age: 80
End: 2020-09-22
Payer: MEDICARE

## 2020-09-22 DIAGNOSIS — I25.10 CORONARY ARTERIOSCLEROSIS: ICD-10-CM

## 2020-09-22 DIAGNOSIS — E78.00 PURE HYPERCHOLESTEROLEMIA: ICD-10-CM

## 2020-09-22 DIAGNOSIS — I10 ESSENTIAL HYPERTENSION: ICD-10-CM

## 2020-09-22 DIAGNOSIS — Z95.1 S/P CABG (CORONARY ARTERY BYPASS GRAFT): ICD-10-CM

## 2020-09-22 LAB
ALBUMIN SERPL BCP-MCNC: 3.7 G/DL (ref 3.5–5)
ALP SERPL-CCNC: 80 U/L (ref 46–116)
ALT SERPL W P-5'-P-CCNC: 26 U/L (ref 12–78)
AST SERPL W P-5'-P-CCNC: 19 U/L (ref 5–45)
BILIRUB DIRECT SERPL-MCNC: 0.21 MG/DL (ref 0–0.2)
BILIRUB SERPL-MCNC: 1.15 MG/DL (ref 0.2–1)
CHOLEST SERPL-MCNC: 201 MG/DL (ref 50–200)
HDLC SERPL-MCNC: 68 MG/DL
LDLC SERPL CALC-MCNC: 115 MG/DL (ref 0–100)
LDLC SERPL DIRECT ASSAY-MCNC: 116 MG/DL (ref 0–100)
NONHDLC SERPL-MCNC: 133 MG/DL
PROT SERPL-MCNC: 7.1 G/DL (ref 6.4–8.2)
TRIGL SERPL-MCNC: 90 MG/DL

## 2020-09-22 PROCEDURE — 36415 COLL VENOUS BLD VENIPUNCTURE: CPT

## 2020-09-22 PROCEDURE — 80076 HEPATIC FUNCTION PANEL: CPT

## 2020-09-22 PROCEDURE — 83721 ASSAY OF BLOOD LIPOPROTEIN: CPT

## 2020-09-22 PROCEDURE — 80061 LIPID PANEL: CPT

## 2020-09-27 DIAGNOSIS — I25.10 CORONARY ARTERIOSCLEROSIS: ICD-10-CM

## 2020-09-28 RX ORDER — PRASUGREL HCL 10 MG
TABLET ORAL
Qty: 30 TABLET | Refills: 0 | Status: SHIPPED | OUTPATIENT
Start: 2020-09-28 | End: 2020-11-01

## 2020-10-02 ENCOUNTER — OFFICE VISIT (OUTPATIENT)
Dept: OBGYN CLINIC | Facility: MEDICAL CENTER | Age: 80
End: 2020-10-02
Payer: MEDICARE

## 2020-10-02 ENCOUNTER — TELEPHONE (OUTPATIENT)
Dept: OBGYN CLINIC | Facility: MEDICAL CENTER | Age: 80
End: 2020-10-02

## 2020-10-02 VITALS — DIASTOLIC BLOOD PRESSURE: 68 MMHG | BODY MASS INDEX: 27.22 KG/M2 | SYSTOLIC BLOOD PRESSURE: 116 MMHG | WEIGHT: 139.4 LBS

## 2020-10-02 DIAGNOSIS — N81.4 UTEROVAGINAL PROLAPSE: Primary | ICD-10-CM

## 2020-10-02 PROCEDURE — 57160 INSERT PESSARY/OTHER DEVICE: CPT | Performed by: STUDENT IN AN ORGANIZED HEALTH CARE EDUCATION/TRAINING PROGRAM

## 2020-10-02 PROCEDURE — 99213 OFFICE O/P EST LOW 20 MIN: CPT | Performed by: STUDENT IN AN ORGANIZED HEALTH CARE EDUCATION/TRAINING PROGRAM

## 2020-10-22 ENCOUNTER — IMMUNIZATIONS (OUTPATIENT)
Dept: FAMILY MEDICINE CLINIC | Facility: MEDICAL CENTER | Age: 80
End: 2020-10-22
Payer: MEDICARE

## 2020-10-22 DIAGNOSIS — Z23 ENCOUNTER FOR IMMUNIZATION: ICD-10-CM

## 2020-10-22 PROCEDURE — G0008 ADMIN INFLUENZA VIRUS VAC: HCPCS

## 2020-10-22 PROCEDURE — 90662 IIV NO PRSV INCREASED AG IM: CPT

## 2020-11-01 DIAGNOSIS — I25.10 CORONARY ARTERIOSCLEROSIS: ICD-10-CM

## 2020-11-01 DIAGNOSIS — I10 ESSENTIAL HYPERTENSION: Chronic | ICD-10-CM

## 2020-11-01 RX ORDER — PRASUGREL HCL 10 MG
TABLET ORAL
Qty: 30 TABLET | Refills: 0 | Status: SHIPPED | OUTPATIENT
Start: 2020-11-01 | End: 2020-12-27

## 2020-11-01 RX ORDER — AMLODIPINE BESYLATE 5 MG/1
TABLET ORAL
Qty: 30 TABLET | Refills: 5 | Status: SHIPPED | OUTPATIENT
Start: 2020-11-01 | End: 2020-12-23 | Stop reason: HOSPADM

## 2020-11-01 RX ORDER — RANOLAZINE 500 MG/1
TABLET, EXTENDED RELEASE ORAL
Qty: 60 TABLET | Refills: 5 | Status: SHIPPED | OUTPATIENT
Start: 2020-11-01 | End: 2020-12-21 | Stop reason: ALTCHOICE

## 2020-11-30 DIAGNOSIS — I65.23 CAROTID STENOSIS, BILATERAL: Primary | ICD-10-CM

## 2020-11-30 DIAGNOSIS — K55.1 MESENTERIC ARTERY STENOSIS (HCC): ICD-10-CM

## 2020-12-02 ENCOUNTER — APPOINTMENT (EMERGENCY)
Dept: RADIOLOGY | Facility: HOSPITAL | Age: 80
DRG: 189 | End: 2020-12-02
Payer: MEDICARE

## 2020-12-02 ENCOUNTER — CLINICAL SUPPORT (OUTPATIENT)
Dept: CARDIOLOGY CLINIC | Facility: CLINIC | Age: 80
End: 2020-12-02
Payer: MEDICARE

## 2020-12-02 ENCOUNTER — HOSPITAL ENCOUNTER (INPATIENT)
Facility: HOSPITAL | Age: 80
LOS: 3 days | Discharge: HOME | DRG: 189 | End: 2020-12-05
Attending: EMERGENCY MEDICINE | Admitting: INTERNAL MEDICINE
Payer: MEDICARE

## 2020-12-02 VITALS
SYSTOLIC BLOOD PRESSURE: 108 MMHG | HEART RATE: 101 BPM | WEIGHT: 140.9 LBS | HEIGHT: 60 IN | BODY MASS INDEX: 27.66 KG/M2 | DIASTOLIC BLOOD PRESSURE: 66 MMHG

## 2020-12-02 DIAGNOSIS — R07.9 CHEST PAIN, UNSPECIFIED TYPE: Primary | ICD-10-CM

## 2020-12-02 DIAGNOSIS — R41.3 MEMORY DIFFICULTY: ICD-10-CM

## 2020-12-02 DIAGNOSIS — R77.8 ELEVATED TROPONIN: ICD-10-CM

## 2020-12-02 DIAGNOSIS — R10.9 ABDOMINAL PAIN: ICD-10-CM

## 2020-12-02 DIAGNOSIS — I50.9 CONGESTIVE HEART FAILURE (CHF) (HCC): ICD-10-CM

## 2020-12-02 DIAGNOSIS — K59.00 CONSTIPATION: ICD-10-CM

## 2020-12-02 DIAGNOSIS — I25.10 CORONARY ARTERY DISEASE INVOLVING NATIVE HEART WITHOUT ANGINA PECTORIS, UNSPECIFIED VESSEL OR LESION TYPE: Chronic | ICD-10-CM

## 2020-12-02 DIAGNOSIS — R07.9 CHEST PAIN: Primary | ICD-10-CM

## 2020-12-02 DIAGNOSIS — J81.0 ACUTE PULMONARY EDEMA (HCC): ICD-10-CM

## 2020-12-02 DIAGNOSIS — R41.89 COGNITIVE IMPAIRMENT: ICD-10-CM

## 2020-12-02 LAB
ALBUMIN SERPL BCP-MCNC: 3.2 G/DL (ref 3.5–5)
ALP SERPL-CCNC: 80 U/L (ref 46–116)
ALT SERPL W P-5'-P-CCNC: 21 U/L (ref 12–78)
ANION GAP SERPL CALCULATED.3IONS-SCNC: 7 MMOL/L (ref 4–13)
AST SERPL W P-5'-P-CCNC: 32 U/L (ref 5–45)
ATRIAL RATE: 120 BPM
ATRIAL RATE: 96 BPM
BASOPHILS # BLD AUTO: 0.04 THOUSANDS/ΜL (ref 0–0.1)
BASOPHILS NFR BLD AUTO: 1 % (ref 0–1)
BILIRUB SERPL-MCNC: 0.77 MG/DL (ref 0.2–1)
BUN SERPL-MCNC: 15 MG/DL (ref 5–25)
CALCIUM ALBUM COR SERPL-MCNC: 9 MG/DL (ref 8.3–10.1)
CALCIUM SERPL-MCNC: 8.4 MG/DL (ref 8.3–10.1)
CHLORIDE SERPL-SCNC: 113 MMOL/L (ref 100–108)
CO2 SERPL-SCNC: 21 MMOL/L (ref 21–32)
CREAT SERPL-MCNC: 0.85 MG/DL (ref 0.6–1.3)
EOSINOPHIL # BLD AUTO: 0.15 THOUSAND/ΜL (ref 0–0.61)
EOSINOPHIL NFR BLD AUTO: 2 % (ref 0–6)
ERYTHROCYTE [DISTWIDTH] IN BLOOD BY AUTOMATED COUNT: 13.1 % (ref 11.6–15.1)
FLUAV RNA RESP QL NAA+PROBE: NEGATIVE
FLUBV RNA RESP QL NAA+PROBE: NEGATIVE
GFR SERPL CREATININE-BSD FRML MDRD: 64 ML/MIN/1.73SQ M
GLUCOSE SERPL-MCNC: 106 MG/DL (ref 65–140)
HCT VFR BLD AUTO: 43.7 % (ref 34.8–46.1)
HGB BLD-MCNC: 14.3 G/DL (ref 11.5–15.4)
IMM GRANULOCYTES # BLD AUTO: 0.03 THOUSAND/UL (ref 0–0.2)
IMM GRANULOCYTES NFR BLD AUTO: 0 % (ref 0–2)
INR PPP: 0.94 (ref 0.84–1.19)
LYMPHOCYTES # BLD AUTO: 2.09 THOUSANDS/ΜL (ref 0.6–4.47)
LYMPHOCYTES NFR BLD AUTO: 26 % (ref 14–44)
MCH RBC QN AUTO: 30 PG (ref 26.8–34.3)
MCHC RBC AUTO-ENTMCNC: 32.7 G/DL (ref 31.4–37.4)
MCV RBC AUTO: 92 FL (ref 82–98)
MONOCYTES # BLD AUTO: 0.59 THOUSAND/ΜL (ref 0.17–1.22)
MONOCYTES NFR BLD AUTO: 7 % (ref 4–12)
NEUTROPHILS # BLD AUTO: 5.06 THOUSANDS/ΜL (ref 1.85–7.62)
NEUTS SEG NFR BLD AUTO: 64 % (ref 43–75)
NRBC BLD AUTO-RTO: 0 /100 WBCS
NT-PROBNP SERPL-MCNC: 958 PG/ML
P AXIS: 70 DEGREES
P AXIS: 72 DEGREES
PLATELET # BLD AUTO: 263 THOUSANDS/UL (ref 149–390)
PMV BLD AUTO: 10.1 FL (ref 8.9–12.7)
POTASSIUM SERPL-SCNC: 4.8 MMOL/L (ref 3.5–5.3)
PR INTERVAL: 128 MS
PR INTERVAL: 130 MS
PROT SERPL-MCNC: 6.4 G/DL (ref 6.4–8.2)
PROTHROMBIN TIME: 12.6 SECONDS (ref 11.6–14.5)
QRS AXIS: 72 DEGREES
QRS AXIS: 73 DEGREES
QRSD INTERVAL: 126 MS
QRSD INTERVAL: 126 MS
QT INTERVAL: 340 MS
QT INTERVAL: 378 MS
QTC INTERVAL: 477 MS
QTC INTERVAL: 480 MS
RBC # BLD AUTO: 4.77 MILLION/UL (ref 3.81–5.12)
RSV RNA RESP QL NAA+PROBE: NEGATIVE
SARS-COV-2 RNA RESP QL NAA+PROBE: NEGATIVE
SODIUM SERPL-SCNC: 141 MMOL/L (ref 136–145)
T WAVE AXIS: 192 DEGREES
T WAVE AXIS: 220 DEGREES
TROPONIN I SERPL-MCNC: 0.14 NG/ML
TROPONIN I SERPL-MCNC: 0.18 NG/ML
TROPONIN I SERPL-MCNC: 0.23 NG/ML
VENTRICULAR RATE: 120 BPM
VENTRICULAR RATE: 96 BPM
WBC # BLD AUTO: 7.96 THOUSAND/UL (ref 4.31–10.16)

## 2020-12-02 PROCEDURE — 84484 ASSAY OF TROPONIN QUANT: CPT | Performed by: EMERGENCY MEDICINE

## 2020-12-02 PROCEDURE — 83880 ASSAY OF NATRIURETIC PEPTIDE: CPT | Performed by: EMERGENCY MEDICINE

## 2020-12-02 PROCEDURE — 93005 ELECTROCARDIOGRAM TRACING: CPT

## 2020-12-02 PROCEDURE — 99223 1ST HOSP IP/OBS HIGH 75: CPT | Performed by: INTERNAL MEDICINE

## 2020-12-02 PROCEDURE — 96375 TX/PRO/DX INJ NEW DRUG ADDON: CPT

## 2020-12-02 PROCEDURE — 99291 CRITICAL CARE FIRST HOUR: CPT | Performed by: EMERGENCY MEDICINE

## 2020-12-02 PROCEDURE — 71045 X-RAY EXAM CHEST 1 VIEW: CPT

## 2020-12-02 PROCEDURE — 85610 PROTHROMBIN TIME: CPT | Performed by: EMERGENCY MEDICINE

## 2020-12-02 PROCEDURE — 0241U HB NFCT DS VIR RESP RNA 4 TRGT: CPT | Performed by: EMERGENCY MEDICINE

## 2020-12-02 PROCEDURE — 36415 COLL VENOUS BLD VENIPUNCTURE: CPT | Performed by: EMERGENCY MEDICINE

## 2020-12-02 PROCEDURE — 85025 COMPLETE CBC W/AUTO DIFF WBC: CPT | Performed by: EMERGENCY MEDICINE

## 2020-12-02 PROCEDURE — 96365 THER/PROPH/DIAG IV INF INIT: CPT

## 2020-12-02 PROCEDURE — 85730 THROMBOPLASTIN TIME PARTIAL: CPT | Performed by: INTERNAL MEDICINE

## 2020-12-02 PROCEDURE — 96374 THER/PROPH/DIAG INJ IV PUSH: CPT

## 2020-12-02 PROCEDURE — 74177 CT ABD & PELVIS W/CONTRAST: CPT

## 2020-12-02 PROCEDURE — 99291 CRITICAL CARE FIRST HOUR: CPT

## 2020-12-02 PROCEDURE — G1004 CDSM NDSC: HCPCS

## 2020-12-02 PROCEDURE — 96366 THER/PROPH/DIAG IV INF ADDON: CPT

## 2020-12-02 PROCEDURE — 93000 ELECTROCARDIOGRAM COMPLETE: CPT | Performed by: INTERNAL MEDICINE

## 2020-12-02 PROCEDURE — 80053 COMPREHEN METABOLIC PANEL: CPT | Performed by: EMERGENCY MEDICINE

## 2020-12-02 PROCEDURE — 96368 THER/DIAG CONCURRENT INF: CPT

## 2020-12-02 PROCEDURE — 84484 ASSAY OF TROPONIN QUANT: CPT | Performed by: INTERNAL MEDICINE

## 2020-12-02 PROCEDURE — 93010 ELECTROCARDIOGRAM REPORT: CPT | Performed by: INTERNAL MEDICINE

## 2020-12-02 PROCEDURE — 1124F ACP DISCUSS-NO DSCNMKR DOCD: CPT | Performed by: INTERNAL MEDICINE

## 2020-12-02 RX ORDER — HEPARIN SODIUM 1000 [USP'U]/ML
3600 INJECTION, SOLUTION INTRAVENOUS; SUBCUTANEOUS
Status: DISCONTINUED | OUTPATIENT
Start: 2020-12-02 | End: 2020-12-04

## 2020-12-02 RX ORDER — FUROSEMIDE 10 MG/ML
40 INJECTION INTRAMUSCULAR; INTRAVENOUS DAILY
Status: DISCONTINUED | OUTPATIENT
Start: 2020-12-03 | End: 2020-12-04

## 2020-12-02 RX ORDER — DOCUSATE SODIUM 100 MG/1
100 CAPSULE, LIQUID FILLED ORAL DAILY
Status: DISCONTINUED | OUTPATIENT
Start: 2020-12-03 | End: 2020-12-05 | Stop reason: HOSPADM

## 2020-12-02 RX ORDER — FUROSEMIDE 10 MG/ML
40 INJECTION INTRAMUSCULAR; INTRAVENOUS ONCE
Status: COMPLETED | OUTPATIENT
Start: 2020-12-02 | End: 2020-12-02

## 2020-12-02 RX ORDER — ATORVASTATIN CALCIUM 40 MG/1
40 TABLET, FILM COATED ORAL
Status: DISCONTINUED | OUTPATIENT
Start: 2020-12-03 | End: 2020-12-03

## 2020-12-02 RX ORDER — NITROGLYCERIN 20 MG/100ML
5-200 INJECTION INTRAVENOUS
Status: DISCONTINUED | OUTPATIENT
Start: 2020-12-02 | End: 2020-12-03

## 2020-12-02 RX ORDER — HEPARIN SODIUM 10000 [USP'U]/100ML
3-20 INJECTION, SOLUTION INTRAVENOUS
Status: DISCONTINUED | OUTPATIENT
Start: 2020-12-02 | End: 2020-12-04

## 2020-12-02 RX ORDER — NITROGLYCERIN 20 MG/100ML
INJECTION INTRAVENOUS
Status: DISCONTINUED
Start: 2020-12-02 | End: 2020-12-02 | Stop reason: WASHOUT

## 2020-12-02 RX ORDER — HEPARIN SODIUM 1000 [USP'U]/ML
1800 INJECTION, SOLUTION INTRAVENOUS; SUBCUTANEOUS
Status: DISCONTINUED | OUTPATIENT
Start: 2020-12-02 | End: 2020-12-04

## 2020-12-02 RX ORDER — MELATONIN
2000 DAILY
Status: DISCONTINUED | OUTPATIENT
Start: 2020-12-03 | End: 2020-12-05 | Stop reason: HOSPADM

## 2020-12-02 RX ORDER — PRASUGREL 10 MG/1
10 TABLET, FILM COATED ORAL EVERY OTHER DAY
Status: DISCONTINUED | OUTPATIENT
Start: 2020-12-03 | End: 2020-12-05 | Stop reason: HOSPADM

## 2020-12-02 RX ORDER — NITROGLYCERIN 0.4 MG/1
0.4 TABLET SUBLINGUAL
Status: DISCONTINUED | OUTPATIENT
Start: 2020-12-02 | End: 2020-12-02 | Stop reason: SDUPTHER

## 2020-12-02 RX ORDER — ACETAMINOPHEN 325 MG/1
650 TABLET ORAL EVERY 6 HOURS PRN
Status: DISCONTINUED | OUTPATIENT
Start: 2020-12-02 | End: 2020-12-05 | Stop reason: HOSPADM

## 2020-12-02 RX ORDER — ASPIRIN 81 MG/1
81 TABLET ORAL DAILY
Status: DISCONTINUED | OUTPATIENT
Start: 2020-12-03 | End: 2020-12-05 | Stop reason: HOSPADM

## 2020-12-02 RX ORDER — ISOSORBIDE MONONITRATE 60 MG/1
60 TABLET, EXTENDED RELEASE ORAL DAILY
Status: DISCONTINUED | OUTPATIENT
Start: 2020-12-03 | End: 2020-12-04

## 2020-12-02 RX ORDER — HEPARIN SODIUM 1000 [USP'U]/ML
3600 INJECTION, SOLUTION INTRAVENOUS; SUBCUTANEOUS ONCE
Status: COMPLETED | OUTPATIENT
Start: 2020-12-02 | End: 2020-12-02

## 2020-12-02 RX ORDER — RANOLAZINE 500 MG/1
500 TABLET, EXTENDED RELEASE ORAL EVERY 12 HOURS
Status: DISCONTINUED | OUTPATIENT
Start: 2020-12-02 | End: 2020-12-05 | Stop reason: HOSPADM

## 2020-12-02 RX ORDER — POLYETHYLENE GLYCOL 3350 17 G/17G
17 POWDER, FOR SOLUTION ORAL DAILY
Status: DISCONTINUED | OUTPATIENT
Start: 2020-12-03 | End: 2020-12-05 | Stop reason: HOSPADM

## 2020-12-02 RX ORDER — AMLODIPINE BESYLATE 5 MG/1
5 TABLET ORAL DAILY
Status: DISCONTINUED | OUTPATIENT
Start: 2020-12-03 | End: 2020-12-05 | Stop reason: HOSPADM

## 2020-12-02 RX ORDER — NITROGLYCERIN 0.4 MG/1
0.4 TABLET SUBLINGUAL
Status: DISCONTINUED | OUTPATIENT
Start: 2020-12-02 | End: 2020-12-05 | Stop reason: HOSPADM

## 2020-12-02 RX ORDER — MAGNESIUM HYDROXIDE/ALUMINUM HYDROXICE/SIMETHICONE 120; 1200; 1200 MG/30ML; MG/30ML; MG/30ML
30 SUSPENSION ORAL EVERY 6 HOURS PRN
Status: DISCONTINUED | OUTPATIENT
Start: 2020-12-02 | End: 2020-12-05 | Stop reason: HOSPADM

## 2020-12-02 RX ORDER — ONDANSETRON 2 MG/ML
4 INJECTION INTRAMUSCULAR; INTRAVENOUS EVERY 6 HOURS PRN
Status: DISCONTINUED | OUTPATIENT
Start: 2020-12-02 | End: 2020-12-05 | Stop reason: HOSPADM

## 2020-12-02 RX ORDER — METOPROLOL SUCCINATE 25 MG/1
25 TABLET, EXTENDED RELEASE ORAL DAILY
Status: DISCONTINUED | OUTPATIENT
Start: 2020-12-03 | End: 2020-12-03

## 2020-12-02 RX ORDER — ASPIRIN 81 MG/1
324 TABLET, CHEWABLE ORAL ONCE
Status: COMPLETED | OUTPATIENT
Start: 2020-12-02 | End: 2020-12-02

## 2020-12-02 RX ADMIN — ASPIRIN 81 MG 324 MG: 81 TABLET ORAL at 15:55

## 2020-12-02 RX ADMIN — HEPARIN SODIUM 12 UNITS/KG/HR: 10000 INJECTION, SOLUTION INTRAVENOUS at 16:25

## 2020-12-02 RX ADMIN — NITROGLYCERIN 0.4 MG: 0.4 TABLET SUBLINGUAL at 15:50

## 2020-12-02 RX ADMIN — HEPARIN SODIUM 3600 UNITS: 1000 INJECTION INTRAVENOUS; SUBCUTANEOUS at 16:29

## 2020-12-02 RX ADMIN — NITROGLYCERIN 80 MCG/MIN: 20 INJECTION INTRAVENOUS at 15:51

## 2020-12-02 RX ADMIN — IOHEXOL 100 ML: 350 INJECTION, SOLUTION INTRAVENOUS at 15:36

## 2020-12-02 RX ADMIN — RANOLAZINE 500 MG: 500 TABLET, FILM COATED, EXTENDED RELEASE ORAL at 23:54

## 2020-12-02 RX ADMIN — FUROSEMIDE 40 MG: 10 INJECTION, SOLUTION INTRAMUSCULAR; INTRAVENOUS at 16:00

## 2020-12-03 ENCOUNTER — APPOINTMENT (INPATIENT)
Dept: NON INVASIVE DIAGNOSTICS | Facility: HOSPITAL | Age: 80
DRG: 189 | End: 2020-12-03
Payer: MEDICARE

## 2020-12-03 ENCOUNTER — TELEPHONE (OUTPATIENT)
Dept: ADMINISTRATIVE | Facility: HOSPITAL | Age: 80
End: 2020-12-03

## 2020-12-03 PROBLEM — J81.0 ACUTE PULMONARY EDEMA (HCC): Status: ACTIVE | Noted: 2020-12-03

## 2020-12-03 LAB
ANION GAP SERPL CALCULATED.3IONS-SCNC: 6 MMOL/L (ref 4–13)
APTT PPP: 117 SECONDS (ref 23–37)
APTT PPP: 60 SECONDS (ref 23–37)
APTT PPP: 73 SECONDS (ref 23–37)
ATRIAL RATE: 82 BPM
BASOPHILS # BLD AUTO: 0.04 THOUSANDS/ΜL (ref 0–0.1)
BASOPHILS NFR BLD AUTO: 1 % (ref 0–1)
BUN SERPL-MCNC: 15 MG/DL (ref 5–25)
CALCIUM SERPL-MCNC: 8.5 MG/DL (ref 8.3–10.1)
CHLORIDE SERPL-SCNC: 109 MMOL/L (ref 100–108)
CO2 SERPL-SCNC: 28 MMOL/L (ref 21–32)
CREAT SERPL-MCNC: 0.81 MG/DL (ref 0.6–1.3)
EOSINOPHIL # BLD AUTO: 0.21 THOUSAND/ΜL (ref 0–0.61)
EOSINOPHIL NFR BLD AUTO: 3 % (ref 0–6)
ERYTHROCYTE [DISTWIDTH] IN BLOOD BY AUTOMATED COUNT: 13.2 % (ref 11.6–15.1)
GFR SERPL CREATININE-BSD FRML MDRD: 68 ML/MIN/1.73SQ M
GLUCOSE SERPL-MCNC: 92 MG/DL (ref 65–140)
HCT VFR BLD AUTO: 38.1 % (ref 34.8–46.1)
HGB BLD-MCNC: 12 G/DL (ref 11.5–15.4)
IMM GRANULOCYTES # BLD AUTO: 0.02 THOUSAND/UL (ref 0–0.2)
IMM GRANULOCYTES NFR BLD AUTO: 0 % (ref 0–2)
LYMPHOCYTES # BLD AUTO: 1.41 THOUSANDS/ΜL (ref 0.6–4.47)
LYMPHOCYTES NFR BLD AUTO: 19 % (ref 14–44)
MAGNESIUM SERPL-MCNC: 2.2 MG/DL (ref 1.6–2.6)
MCH RBC QN AUTO: 29.9 PG (ref 26.8–34.3)
MCHC RBC AUTO-ENTMCNC: 31.5 G/DL (ref 31.4–37.4)
MCV RBC AUTO: 95 FL (ref 82–98)
MONOCYTES # BLD AUTO: 0.65 THOUSAND/ΜL (ref 0.17–1.22)
MONOCYTES NFR BLD AUTO: 9 % (ref 4–12)
NEUTROPHILS # BLD AUTO: 5.08 THOUSANDS/ΜL (ref 1.85–7.62)
NEUTS SEG NFR BLD AUTO: 68 % (ref 43–75)
NRBC BLD AUTO-RTO: 0 /100 WBCS
P AXIS: 59 DEGREES
PLATELET # BLD AUTO: 207 THOUSANDS/UL (ref 149–390)
PMV BLD AUTO: 10.3 FL (ref 8.9–12.7)
POTASSIUM SERPL-SCNC: 3.4 MMOL/L (ref 3.5–5.3)
PR INTERVAL: 140 MS
QRS AXIS: -9 DEGREES
QRSD INTERVAL: 128 MS
QT INTERVAL: 426 MS
QTC INTERVAL: 497 MS
RBC # BLD AUTO: 4.02 MILLION/UL (ref 3.81–5.12)
SODIUM SERPL-SCNC: 143 MMOL/L (ref 136–145)
T WAVE AXIS: 138 DEGREES
VENTRICULAR RATE: 82 BPM
WBC # BLD AUTO: 7.41 THOUSAND/UL (ref 4.31–10.16)

## 2020-12-03 PROCEDURE — 97163 PT EVAL HIGH COMPLEX 45 MIN: CPT

## 2020-12-03 PROCEDURE — C8929 TTE W OR WO FOL WCON,DOPPLER: HCPCS

## 2020-12-03 PROCEDURE — 93306 TTE W/DOPPLER COMPLETE: CPT | Performed by: INTERNAL MEDICINE

## 2020-12-03 PROCEDURE — 80048 BASIC METABOLIC PNL TOTAL CA: CPT | Performed by: INTERNAL MEDICINE

## 2020-12-03 PROCEDURE — 93010 ELECTROCARDIOGRAM REPORT: CPT | Performed by: INTERNAL MEDICINE

## 2020-12-03 PROCEDURE — 85730 THROMBOPLASTIN TIME PARTIAL: CPT | Performed by: GENERAL PRACTICE

## 2020-12-03 PROCEDURE — 99233 SBSQ HOSP IP/OBS HIGH 50: CPT | Performed by: GENERAL PRACTICE

## 2020-12-03 PROCEDURE — 97530 THERAPEUTIC ACTIVITIES: CPT

## 2020-12-03 PROCEDURE — 93005 ELECTROCARDIOGRAM TRACING: CPT

## 2020-12-03 PROCEDURE — 85025 COMPLETE CBC W/AUTO DIFF WBC: CPT | Performed by: INTERNAL MEDICINE

## 2020-12-03 PROCEDURE — 99232 SBSQ HOSP IP/OBS MODERATE 35: CPT | Performed by: INTERNAL MEDICINE

## 2020-12-03 PROCEDURE — 85730 THROMBOPLASTIN TIME PARTIAL: CPT | Performed by: INTERNAL MEDICINE

## 2020-12-03 PROCEDURE — 83735 ASSAY OF MAGNESIUM: CPT | Performed by: INTERNAL MEDICINE

## 2020-12-03 RX ORDER — METOPROLOL SUCCINATE 50 MG/1
50 TABLET, EXTENDED RELEASE ORAL DAILY
Status: DISCONTINUED | OUTPATIENT
Start: 2020-12-04 | End: 2020-12-05 | Stop reason: HOSPADM

## 2020-12-03 RX ORDER — ATORVASTATIN CALCIUM 20 MG/1
20 TABLET, FILM COATED ORAL
Status: DISCONTINUED | OUTPATIENT
Start: 2020-12-03 | End: 2020-12-05 | Stop reason: HOSPADM

## 2020-12-03 RX ORDER — POTASSIUM CHLORIDE 20 MEQ/1
40 TABLET, EXTENDED RELEASE ORAL ONCE
Status: COMPLETED | OUTPATIENT
Start: 2020-12-03 | End: 2020-12-03

## 2020-12-03 RX ADMIN — NITROGLYCERIN 45 MCG/MIN: 20 INJECTION INTRAVENOUS at 04:43

## 2020-12-03 RX ADMIN — PRASUGREL HYDROCHLORIDE 10 MG: 10 TABLET, FILM COATED ORAL at 09:33

## 2020-12-03 RX ADMIN — ASPIRIN 81 MG: 81 TABLET ORAL at 09:33

## 2020-12-03 RX ADMIN — ALUMINUM HYDROXIDE, MAGNESIUM HYDROXIDE, AND SIMETHICONE 30 ML: 200; 200; 20 SUSPENSION ORAL at 18:22

## 2020-12-03 RX ADMIN — METOPROLOL TARTRATE 25 MG: 25 TABLET, FILM COATED ORAL at 18:36

## 2020-12-03 RX ADMIN — POTASSIUM CHLORIDE 40 MEQ: 1500 TABLET, EXTENDED RELEASE ORAL at 09:32

## 2020-12-03 RX ADMIN — Medication 1 TABLET: at 09:32

## 2020-12-03 RX ADMIN — PERFLUTREN 0.6 ML/MIN: 6.52 INJECTION, SUSPENSION INTRAVENOUS at 11:55

## 2020-12-03 RX ADMIN — RANOLAZINE 500 MG: 500 TABLET, FILM COATED, EXTENDED RELEASE ORAL at 11:48

## 2020-12-03 RX ADMIN — Medication 2000 UNITS: at 09:32

## 2020-12-03 RX ADMIN — ATORVASTATIN CALCIUM 20 MG: 20 TABLET, FILM COATED ORAL at 17:07

## 2020-12-03 RX ADMIN — RANOLAZINE 500 MG: 500 TABLET, FILM COATED, EXTENDED RELEASE ORAL at 20:24

## 2020-12-03 RX ADMIN — NITROGLYCERIN 0.4 MG: 0.4 TABLET SUBLINGUAL at 18:08

## 2020-12-03 RX ADMIN — DOCUSATE SODIUM 100 MG: 100 CAPSULE ORAL at 09:32

## 2020-12-04 LAB
ANION GAP SERPL CALCULATED.3IONS-SCNC: 9 MMOL/L (ref 4–13)
APTT PPP: 63 SECONDS (ref 23–37)
ATRIAL RATE: 76 BPM
ATRIAL RATE: 89 BPM
BUN SERPL-MCNC: 13 MG/DL (ref 5–25)
CALCIUM SERPL-MCNC: 8.8 MG/DL (ref 8.3–10.1)
CHLORIDE SERPL-SCNC: 111 MMOL/L (ref 100–108)
CHOLEST SERPL-MCNC: 168 MG/DL (ref 50–200)
CO2 SERPL-SCNC: 21 MMOL/L (ref 21–32)
CREAT SERPL-MCNC: 0.74 MG/DL (ref 0.6–1.3)
ERYTHROCYTE [DISTWIDTH] IN BLOOD BY AUTOMATED COUNT: 13.1 % (ref 11.6–15.1)
GFR SERPL CREATININE-BSD FRML MDRD: 76 ML/MIN/1.73SQ M
GLUCOSE SERPL-MCNC: 106 MG/DL (ref 65–140)
HCT VFR BLD AUTO: 40.1 % (ref 34.8–46.1)
HDLC SERPL-MCNC: 62 MG/DL
HGB BLD-MCNC: 12.9 G/DL (ref 11.5–15.4)
LDLC SERPL CALC-MCNC: 88 MG/DL (ref 0–100)
MCH RBC QN AUTO: 30.4 PG (ref 26.8–34.3)
MCHC RBC AUTO-ENTMCNC: 32.2 G/DL (ref 31.4–37.4)
MCV RBC AUTO: 94 FL (ref 82–98)
P AXIS: 66 DEGREES
P AXIS: 82 DEGREES
PLATELET # BLD AUTO: 228 THOUSANDS/UL (ref 149–390)
PMV BLD AUTO: 10.1 FL (ref 8.9–12.7)
POTASSIUM SERPL-SCNC: 4.6 MMOL/L (ref 3.5–5.3)
PR INTERVAL: 126 MS
PR INTERVAL: 132 MS
QRS AXIS: 15 DEGREES
QRS AXIS: 7 DEGREES
QRSD INTERVAL: 126 MS
QRSD INTERVAL: 128 MS
QT INTERVAL: 406 MS
QT INTERVAL: 422 MS
QTC INTERVAL: 474 MS
QTC INTERVAL: 493 MS
RBC # BLD AUTO: 4.25 MILLION/UL (ref 3.81–5.12)
SODIUM SERPL-SCNC: 141 MMOL/L (ref 136–145)
T WAVE AXIS: 115 DEGREES
T WAVE AXIS: 129 DEGREES
TRIGL SERPL-MCNC: 88 MG/DL
TROPONIN I SERPL-MCNC: 0.06 NG/ML
VENTRICULAR RATE: 76 BPM
VENTRICULAR RATE: 89 BPM
WBC # BLD AUTO: 7.07 THOUSAND/UL (ref 4.31–10.16)

## 2020-12-04 PROCEDURE — 99232 SBSQ HOSP IP/OBS MODERATE 35: CPT | Performed by: GENERAL PRACTICE

## 2020-12-04 PROCEDURE — 80048 BASIC METABOLIC PNL TOTAL CA: CPT | Performed by: GENERAL PRACTICE

## 2020-12-04 PROCEDURE — 84484 ASSAY OF TROPONIN QUANT: CPT | Performed by: PHYSICIAN ASSISTANT

## 2020-12-04 PROCEDURE — 85730 THROMBOPLASTIN TIME PARTIAL: CPT | Performed by: GENERAL PRACTICE

## 2020-12-04 PROCEDURE — 93005 ELECTROCARDIOGRAM TRACING: CPT

## 2020-12-04 PROCEDURE — 85027 COMPLETE CBC AUTOMATED: CPT | Performed by: GENERAL PRACTICE

## 2020-12-04 PROCEDURE — 93010 ELECTROCARDIOGRAM REPORT: CPT | Performed by: INTERNAL MEDICINE

## 2020-12-04 PROCEDURE — 97166 OT EVAL MOD COMPLEX 45 MIN: CPT

## 2020-12-04 PROCEDURE — 80061 LIPID PANEL: CPT | Performed by: GENERAL PRACTICE

## 2020-12-04 PROCEDURE — 99232 SBSQ HOSP IP/OBS MODERATE 35: CPT | Performed by: INTERNAL MEDICINE

## 2020-12-04 RX ORDER — FUROSEMIDE 40 MG/1
40 TABLET ORAL DAILY
Status: DISCONTINUED | OUTPATIENT
Start: 2020-12-05 | End: 2020-12-05 | Stop reason: HOSPADM

## 2020-12-04 RX ORDER — SENNOSIDES 8.6 MG
1 TABLET ORAL
Status: DISCONTINUED | OUTPATIENT
Start: 2020-12-04 | End: 2020-12-05 | Stop reason: HOSPADM

## 2020-12-04 RX ADMIN — METOPROLOL SUCCINATE 50 MG: 50 TABLET, EXTENDED RELEASE ORAL at 08:35

## 2020-12-04 RX ADMIN — ASPIRIN 81 MG: 81 TABLET ORAL at 08:35

## 2020-12-04 RX ADMIN — Medication 2000 UNITS: at 08:35

## 2020-12-04 RX ADMIN — ATORVASTATIN CALCIUM 20 MG: 20 TABLET, FILM COATED ORAL at 15:46

## 2020-12-04 RX ADMIN — NITROGLYCERIN 0.4 MG: 0.4 TABLET SUBLINGUAL at 04:16

## 2020-12-04 RX ADMIN — SENNOSIDES 8.6 MG: 8.6 TABLET ORAL at 21:57

## 2020-12-04 RX ADMIN — ENOXAPARIN SODIUM 40 MG: 40 INJECTION SUBCUTANEOUS at 15:46

## 2020-12-04 RX ADMIN — RANOLAZINE 500 MG: 500 TABLET, FILM COATED, EXTENDED RELEASE ORAL at 21:57

## 2020-12-04 RX ADMIN — ISOSORBIDE MONONITRATE 60 MG: 60 TABLET, EXTENDED RELEASE ORAL at 08:32

## 2020-12-04 RX ADMIN — DOCUSATE SODIUM 100 MG: 100 CAPSULE ORAL at 08:35

## 2020-12-04 RX ADMIN — RANOLAZINE 500 MG: 500 TABLET, FILM COATED, EXTENDED RELEASE ORAL at 08:32

## 2020-12-04 RX ADMIN — AMLODIPINE BESYLATE 5 MG: 5 TABLET ORAL at 08:35

## 2020-12-04 RX ADMIN — POLYETHYLENE GLYCOL 3350 17 G: 17 POWDER, FOR SOLUTION ORAL at 08:37

## 2020-12-04 RX ADMIN — Medication 1 TABLET: at 08:35

## 2020-12-04 RX ADMIN — FUROSEMIDE 40 MG: 10 INJECTION, SOLUTION INTRAMUSCULAR; INTRAVENOUS at 08:37

## 2020-12-05 VITALS
OXYGEN SATURATION: 97 % | SYSTOLIC BLOOD PRESSURE: 127 MMHG | WEIGHT: 136.69 LBS | DIASTOLIC BLOOD PRESSURE: 72 MMHG | TEMPERATURE: 98.3 F | RESPIRATION RATE: 18 BRPM | HEART RATE: 79 BPM | BODY MASS INDEX: 26.84 KG/M2 | HEIGHT: 60 IN

## 2020-12-05 PROBLEM — J81.0 ACUTE PULMONARY EDEMA (HCC): Status: RESOLVED | Noted: 2020-12-03 | Resolved: 2020-12-05

## 2020-12-05 PROBLEM — R07.9 CHEST PAIN: Status: RESOLVED | Noted: 2020-12-02 | Resolved: 2020-12-05

## 2020-12-05 LAB
ANION GAP SERPL CALCULATED.3IONS-SCNC: 6 MMOL/L (ref 4–13)
APTT PPP: 31 SECONDS (ref 23–37)
BUN SERPL-MCNC: 20 MG/DL (ref 5–25)
CALCIUM SERPL-MCNC: 9.3 MG/DL (ref 8.3–10.1)
CHLORIDE SERPL-SCNC: 109 MMOL/L (ref 100–108)
CO2 SERPL-SCNC: 27 MMOL/L (ref 21–32)
CREAT SERPL-MCNC: 0.8 MG/DL (ref 0.6–1.3)
GFR SERPL CREATININE-BSD FRML MDRD: 69 ML/MIN/1.73SQ M
GLUCOSE SERPL-MCNC: 92 MG/DL (ref 65–140)
POTASSIUM SERPL-SCNC: 4.1 MMOL/L (ref 3.5–5.3)
SODIUM SERPL-SCNC: 142 MMOL/L (ref 136–145)

## 2020-12-05 PROCEDURE — 99239 HOSP IP/OBS DSCHRG MGMT >30: CPT | Performed by: GENERAL PRACTICE

## 2020-12-05 PROCEDURE — 99232 SBSQ HOSP IP/OBS MODERATE 35: CPT | Performed by: INTERNAL MEDICINE

## 2020-12-05 PROCEDURE — 85730 THROMBOPLASTIN TIME PARTIAL: CPT | Performed by: GENERAL PRACTICE

## 2020-12-05 PROCEDURE — 80048 BASIC METABOLIC PNL TOTAL CA: CPT | Performed by: GENERAL PRACTICE

## 2020-12-05 RX ORDER — POLYETHYLENE GLYCOL 3350 17 G/17G
17 POWDER, FOR SOLUTION ORAL DAILY
Qty: 30 EACH | Refills: 0 | Status: SHIPPED | OUTPATIENT
Start: 2020-12-06 | End: 2021-02-13 | Stop reason: HOSPADM

## 2020-12-05 RX ORDER — BISACODYL 10 MG
10 SUPPOSITORY, RECTAL RECTAL DAILY PRN
Qty: 12 SUPPOSITORY | Refills: 0 | Status: SHIPPED | OUTPATIENT
Start: 2020-12-05 | End: 2021-01-07

## 2020-12-05 RX ORDER — FUROSEMIDE 40 MG/1
40 TABLET ORAL DAILY
Qty: 30 TABLET | Refills: 0 | Status: SHIPPED | OUTPATIENT
Start: 2020-12-06 | End: 2021-02-13 | Stop reason: HOSPADM

## 2020-12-05 RX ORDER — SENNOSIDES 8.6 MG
8.6 TABLET ORAL
Qty: 30 TABLET | Refills: 0 | Status: SHIPPED | OUTPATIENT
Start: 2020-12-05 | End: 2021-02-13 | Stop reason: HOSPADM

## 2020-12-05 RX ORDER — METOPROLOL SUCCINATE 50 MG/1
50 TABLET, EXTENDED RELEASE ORAL DAILY
Qty: 30 TABLET | Refills: 0 | Status: SHIPPED | OUTPATIENT
Start: 2020-12-06 | End: 2021-01-07 | Stop reason: SDUPTHER

## 2020-12-05 RX ORDER — ATORVASTATIN CALCIUM 20 MG/1
20 TABLET, FILM COATED ORAL
Qty: 30 TABLET | Refills: 0 | Status: ON HOLD | OUTPATIENT
Start: 2020-12-05 | End: 2021-02-23 | Stop reason: SDUPTHER

## 2020-12-05 RX ORDER — ISOSORBIDE MONONITRATE 30 MG/1
90 TABLET, EXTENDED RELEASE ORAL DAILY
Qty: 30 TABLET | Refills: 0 | Status: SHIPPED | OUTPATIENT
Start: 2020-12-06 | End: 2021-01-07 | Stop reason: SDUPTHER

## 2020-12-05 RX ORDER — DOCUSATE SODIUM 100 MG/1
100 CAPSULE, LIQUID FILLED ORAL 2 TIMES DAILY
Qty: 60 CAPSULE | Refills: 0 | Status: SHIPPED | OUTPATIENT
Start: 2020-12-05 | End: 2021-02-13 | Stop reason: HOSPADM

## 2020-12-05 RX ADMIN — ENOXAPARIN SODIUM 40 MG: 40 INJECTION SUBCUTANEOUS at 08:55

## 2020-12-05 RX ADMIN — ISOSORBIDE MONONITRATE 90 MG: 30 TABLET, EXTENDED RELEASE ORAL at 08:55

## 2020-12-05 RX ADMIN — METOPROLOL SUCCINATE 50 MG: 50 TABLET, EXTENDED RELEASE ORAL at 08:54

## 2020-12-05 RX ADMIN — Medication 1 TABLET: at 08:54

## 2020-12-05 RX ADMIN — ASPIRIN 81 MG: 81 TABLET ORAL at 08:55

## 2020-12-05 RX ADMIN — DOCUSATE SODIUM 100 MG: 100 CAPSULE ORAL at 08:55

## 2020-12-05 RX ADMIN — RANOLAZINE 500 MG: 500 TABLET, FILM COATED, EXTENDED RELEASE ORAL at 08:54

## 2020-12-05 RX ADMIN — AMLODIPINE BESYLATE 5 MG: 5 TABLET ORAL at 08:55

## 2020-12-05 RX ADMIN — Medication 2000 UNITS: at 08:54

## 2020-12-05 RX ADMIN — PRASUGREL HYDROCHLORIDE 10 MG: 10 TABLET, FILM COATED ORAL at 08:58

## 2020-12-05 RX ADMIN — POLYETHYLENE GLYCOL 3350 17 G: 17 POWDER, FOR SOLUTION ORAL at 08:54

## 2020-12-05 RX ADMIN — FUROSEMIDE 40 MG: 40 TABLET ORAL at 08:54

## 2020-12-08 ENCOUNTER — TRANSITIONAL CARE MANAGEMENT (OUTPATIENT)
Dept: FAMILY MEDICINE CLINIC | Facility: MEDICAL CENTER | Age: 80
End: 2020-12-08

## 2020-12-16 ENCOUNTER — OFFICE VISIT (OUTPATIENT)
Dept: FAMILY MEDICINE CLINIC | Facility: MEDICAL CENTER | Age: 80
End: 2020-12-16

## 2020-12-16 VITALS
HEIGHT: 60 IN | BODY MASS INDEX: 27.01 KG/M2 | DIASTOLIC BLOOD PRESSURE: 68 MMHG | SYSTOLIC BLOOD PRESSURE: 129 MMHG | HEART RATE: 78 BPM | TEMPERATURE: 97.8 F | WEIGHT: 137.6 LBS | OXYGEN SATURATION: 97 %

## 2020-12-16 DIAGNOSIS — H90.3 SENSORINEURAL HEARING LOSS (SNHL) OF BOTH EARS: ICD-10-CM

## 2020-12-16 DIAGNOSIS — I25.10 CORONARY ARTERY DISEASE INVOLVING NATIVE HEART WITHOUT ANGINA PECTORIS, UNSPECIFIED VESSEL OR LESION TYPE: Primary | ICD-10-CM

## 2020-12-16 DIAGNOSIS — R39.9 URINARY TRACT INFECTION SYMPTOMS: ICD-10-CM

## 2020-12-16 PROCEDURE — 99495 TRANSJ CARE MGMT MOD F2F 14D: CPT | Performed by: FAMILY MEDICINE

## 2020-12-16 PROCEDURE — 1111F DSCHRG MED/CURRENT MED MERGE: CPT | Performed by: FAMILY MEDICINE

## 2020-12-18 DIAGNOSIS — I25.10 CORONARY ARTERY DISEASE INVOLVING NATIVE HEART WITHOUT ANGINA PECTORIS, UNSPECIFIED VESSEL OR LESION TYPE: Primary | Chronic | ICD-10-CM

## 2020-12-18 RX ORDER — NITROGLYCERIN 0.4 MG/1
0.4 TABLET SUBLINGUAL
Qty: 25 TABLET | Refills: 0 | Status: SHIPPED | OUTPATIENT
Start: 2020-12-18 | End: 2021-01-06

## 2020-12-21 ENCOUNTER — TELEPHONE (OUTPATIENT)
Dept: CARDIOLOGY CLINIC | Facility: CLINIC | Age: 80
End: 2020-12-21

## 2020-12-21 RX ORDER — RANOLAZINE 1000 MG/1
1000 TABLET, EXTENDED RELEASE ORAL 2 TIMES DAILY
COMMUNITY
End: 2021-02-13 | Stop reason: HOSPADM

## 2020-12-23 ENCOUNTER — HOSPITAL ENCOUNTER (OUTPATIENT)
Facility: HOSPITAL | Age: 80
Setting detail: OBSERVATION
Discharge: HOME | End: 2020-12-23
Attending: EMERGENCY MEDICINE | Admitting: INTERNAL MEDICINE
Payer: MEDICARE

## 2020-12-23 ENCOUNTER — APPOINTMENT (EMERGENCY)
Dept: RADIOLOGY | Facility: HOSPITAL | Age: 80
End: 2020-12-23
Payer: MEDICARE

## 2020-12-23 VITALS
HEART RATE: 80 BPM | BODY MASS INDEX: 27.01 KG/M2 | WEIGHT: 137.57 LBS | SYSTOLIC BLOOD PRESSURE: 137 MMHG | OXYGEN SATURATION: 99 % | TEMPERATURE: 98.1 F | HEIGHT: 60 IN | DIASTOLIC BLOOD PRESSURE: 63 MMHG | RESPIRATION RATE: 20 BRPM

## 2020-12-23 DIAGNOSIS — I25.10 CAD (CORONARY ARTERY DISEASE): Chronic | ICD-10-CM

## 2020-12-23 DIAGNOSIS — I10 ESSENTIAL HYPERTENSION: Chronic | ICD-10-CM

## 2020-12-23 DIAGNOSIS — R07.9 CHEST PAIN: Primary | ICD-10-CM

## 2020-12-23 DIAGNOSIS — R06.02 SHORTNESS OF BREATH: ICD-10-CM

## 2020-12-23 DIAGNOSIS — R53.83 FATIGUE: ICD-10-CM

## 2020-12-23 PROBLEM — R41.89 COGNITIVE CHANGES: Status: ACTIVE | Noted: 2020-12-23

## 2020-12-23 PROBLEM — R07.2 PRECORDIAL PAIN: Status: ACTIVE | Noted: 2020-12-02

## 2020-12-23 PROBLEM — R07.89 MUSCULOSKELETAL CHEST PAIN: Status: ACTIVE | Noted: 2020-12-23

## 2020-12-23 LAB
ALBUMIN SERPL BCP-MCNC: 3.5 G/DL (ref 3.5–5)
ALP SERPL-CCNC: 75 U/L (ref 46–116)
ALT SERPL W P-5'-P-CCNC: 28 U/L (ref 12–78)
ANION GAP SERPL CALCULATED.3IONS-SCNC: 3 MMOL/L (ref 4–13)
AST SERPL W P-5'-P-CCNC: 66 U/L (ref 5–45)
ATRIAL RATE: 77 BPM
ATRIAL RATE: 83 BPM
ATRIAL RATE: 85 BPM
BASOPHILS # BLD AUTO: 0.03 THOUSANDS/ΜL (ref 0–0.1)
BASOPHILS NFR BLD AUTO: 1 % (ref 0–1)
BILIRUB SERPL-MCNC: 1.09 MG/DL (ref 0.2–1)
BUN SERPL-MCNC: 15 MG/DL (ref 5–25)
CALCIUM SERPL-MCNC: 9.3 MG/DL (ref 8.3–10.1)
CHLORIDE SERPL-SCNC: 104 MMOL/L (ref 100–108)
CO2 SERPL-SCNC: 28 MMOL/L (ref 21–32)
CREAT SERPL-MCNC: 0.84 MG/DL (ref 0.6–1.3)
EOSINOPHIL # BLD AUTO: 0.12 THOUSAND/ΜL (ref 0–0.61)
EOSINOPHIL NFR BLD AUTO: 2 % (ref 0–6)
ERYTHROCYTE [DISTWIDTH] IN BLOOD BY AUTOMATED COUNT: 13.1 % (ref 11.6–15.1)
FLUAV RNA RESP QL NAA+PROBE: NEGATIVE
FLUBV RNA RESP QL NAA+PROBE: NEGATIVE
GFR SERPL CREATININE-BSD FRML MDRD: 65 ML/MIN/1.73SQ M
GLUCOSE SERPL-MCNC: 100 MG/DL (ref 65–140)
HCT VFR BLD AUTO: 38.7 % (ref 34.8–46.1)
HGB BLD-MCNC: 12.6 G/DL (ref 11.5–15.4)
IMM GRANULOCYTES # BLD AUTO: 0.03 THOUSAND/UL (ref 0–0.2)
IMM GRANULOCYTES NFR BLD AUTO: 1 % (ref 0–2)
LYMPHOCYTES # BLD AUTO: 1.01 THOUSANDS/ΜL (ref 0.6–4.47)
LYMPHOCYTES NFR BLD AUTO: 18 % (ref 14–44)
MCH RBC QN AUTO: 30.1 PG (ref 26.8–34.3)
MCHC RBC AUTO-ENTMCNC: 32.6 G/DL (ref 31.4–37.4)
MCV RBC AUTO: 92 FL (ref 82–98)
MONOCYTES # BLD AUTO: 0.47 THOUSAND/ΜL (ref 0.17–1.22)
MONOCYTES NFR BLD AUTO: 8 % (ref 4–12)
NEUTROPHILS # BLD AUTO: 4.09 THOUSANDS/ΜL (ref 1.85–7.62)
NEUTS SEG NFR BLD AUTO: 70 % (ref 43–75)
NRBC BLD AUTO-RTO: 0 /100 WBCS
P AXIS: 63 DEGREES
P AXIS: 65 DEGREES
P AXIS: 65 DEGREES
PLATELET # BLD AUTO: 200 THOUSANDS/UL (ref 149–390)
PLATELET # BLD AUTO: 207 THOUSANDS/UL (ref 149–390)
PMV BLD AUTO: 10.9 FL (ref 8.9–12.7)
PMV BLD AUTO: 11.1 FL (ref 8.9–12.7)
POTASSIUM SERPL-SCNC: 4.2 MMOL/L (ref 3.5–5.3)
POTASSIUM SERPL-SCNC: 6.2 MMOL/L (ref 3.5–5.3)
PR INTERVAL: 134 MS
PR INTERVAL: 138 MS
PR INTERVAL: 140 MS
PROT SERPL-MCNC: 7.4 G/DL (ref 6.4–8.2)
QRS AXIS: -1 DEGREES
QRS AXIS: -11 DEGREES
QRS AXIS: -26 DEGREES
QRSD INTERVAL: 128 MS
QRSD INTERVAL: 130 MS
QRSD INTERVAL: 132 MS
QT INTERVAL: 384 MS
QT INTERVAL: 408 MS
QT INTERVAL: 424 MS
QTC INTERVAL: 456 MS
QTC INTERVAL: 479 MS
QTC INTERVAL: 479 MS
RBC # BLD AUTO: 4.19 MILLION/UL (ref 3.81–5.12)
RSV RNA RESP QL NAA+PROBE: NEGATIVE
SARS-COV-2 RNA RESP QL NAA+PROBE: NEGATIVE
SODIUM SERPL-SCNC: 135 MMOL/L (ref 136–145)
T WAVE AXIS: 131 DEGREES
T WAVE AXIS: 136 DEGREES
T WAVE AXIS: 141 DEGREES
TROPONIN I SERPL-MCNC: 0.07 NG/ML
TROPONIN I SERPL-MCNC: 0.63 NG/ML
TROPONIN I SERPL-MCNC: 0.65 NG/ML
TSH SERPL DL<=0.05 MIU/L-ACNC: 0.78 UIU/ML (ref 0.36–3.74)
VENTRICULAR RATE: 77 BPM
VENTRICULAR RATE: 83 BPM
VENTRICULAR RATE: 85 BPM
WBC # BLD AUTO: 5.75 THOUSAND/UL (ref 4.31–10.16)

## 2020-12-23 PROCEDURE — 93010 ELECTROCARDIOGRAM REPORT: CPT | Performed by: INTERNAL MEDICINE

## 2020-12-23 PROCEDURE — 99214 OFFICE O/P EST MOD 30 MIN: CPT | Performed by: INTERNAL MEDICINE

## 2020-12-23 PROCEDURE — 93005 ELECTROCARDIOGRAM TRACING: CPT

## 2020-12-23 PROCEDURE — 84132 ASSAY OF SERUM POTASSIUM: CPT | Performed by: PHYSICIAN ASSISTANT

## 2020-12-23 PROCEDURE — 84484 ASSAY OF TROPONIN QUANT: CPT | Performed by: PHYSICIAN ASSISTANT

## 2020-12-23 PROCEDURE — 99285 EMERGENCY DEPT VISIT HI MDM: CPT

## 2020-12-23 PROCEDURE — NC001 PR NO CHARGE: Performed by: PHYSICIAN ASSISTANT

## 2020-12-23 PROCEDURE — 99236 HOSP IP/OBS SAME DATE HI 85: CPT | Performed by: INTERNAL MEDICINE

## 2020-12-23 PROCEDURE — 99285 EMERGENCY DEPT VISIT HI MDM: CPT | Performed by: EMERGENCY MEDICINE

## 2020-12-23 PROCEDURE — 0241U HB NFCT DS VIR RESP RNA 4 TRGT: CPT | Performed by: EMERGENCY MEDICINE

## 2020-12-23 PROCEDURE — 85025 COMPLETE CBC W/AUTO DIFF WBC: CPT | Performed by: EMERGENCY MEDICINE

## 2020-12-23 PROCEDURE — 85049 AUTOMATED PLATELET COUNT: CPT | Performed by: INTERNAL MEDICINE

## 2020-12-23 PROCEDURE — 84443 ASSAY THYROID STIM HORMONE: CPT | Performed by: INTERNAL MEDICINE

## 2020-12-23 PROCEDURE — 71045 X-RAY EXAM CHEST 1 VIEW: CPT

## 2020-12-23 PROCEDURE — 80053 COMPREHEN METABOLIC PANEL: CPT | Performed by: EMERGENCY MEDICINE

## 2020-12-23 PROCEDURE — 36415 COLL VENOUS BLD VENIPUNCTURE: CPT | Performed by: EMERGENCY MEDICINE

## 2020-12-23 PROCEDURE — 84484 ASSAY OF TROPONIN QUANT: CPT | Performed by: EMERGENCY MEDICINE

## 2020-12-23 RX ORDER — AMLODIPINE BESYLATE 5 MG/1
5 TABLET ORAL ONCE
Status: COMPLETED | OUTPATIENT
Start: 2020-12-23 | End: 2020-12-23

## 2020-12-23 RX ORDER — SENNOSIDES 8.6 MG
8.6 TABLET ORAL
Status: DISCONTINUED | OUTPATIENT
Start: 2020-12-23 | End: 2020-12-23 | Stop reason: HOSPADM

## 2020-12-23 RX ORDER — METOPROLOL SUCCINATE 50 MG/1
50 TABLET, EXTENDED RELEASE ORAL DAILY
Status: DISCONTINUED | OUTPATIENT
Start: 2020-12-23 | End: 2020-12-23 | Stop reason: HOSPADM

## 2020-12-23 RX ORDER — PANTOPRAZOLE SODIUM 40 MG/1
40 TABLET, DELAYED RELEASE ORAL
Status: DISCONTINUED | OUTPATIENT
Start: 2020-12-23 | End: 2020-12-23 | Stop reason: HOSPADM

## 2020-12-23 RX ORDER — RANOLAZINE 500 MG/1
1000 TABLET, EXTENDED RELEASE ORAL 2 TIMES DAILY
Status: DISCONTINUED | OUTPATIENT
Start: 2020-12-23 | End: 2020-12-23 | Stop reason: HOSPADM

## 2020-12-23 RX ORDER — DOCUSATE SODIUM 100 MG/1
100 CAPSULE, LIQUID FILLED ORAL 2 TIMES DAILY
Status: DISCONTINUED | OUTPATIENT
Start: 2020-12-23 | End: 2020-12-23 | Stop reason: HOSPADM

## 2020-12-23 RX ORDER — AMLODIPINE BESYLATE 10 MG/1
10 TABLET ORAL DAILY
Qty: 30 TABLET | Refills: 0 | Status: SHIPPED | OUTPATIENT
Start: 2020-12-24 | End: 2021-02-13 | Stop reason: HOSPADM

## 2020-12-23 RX ORDER — AMLODIPINE BESYLATE 5 MG/1
5 TABLET ORAL DAILY
Status: DISCONTINUED | OUTPATIENT
Start: 2020-12-23 | End: 2020-12-23

## 2020-12-23 RX ORDER — ONDANSETRON 2 MG/ML
4 INJECTION INTRAMUSCULAR; INTRAVENOUS EVERY 6 HOURS PRN
Status: DISCONTINUED | OUTPATIENT
Start: 2020-12-23 | End: 2020-12-23 | Stop reason: HOSPADM

## 2020-12-23 RX ORDER — PANTOPRAZOLE SODIUM 40 MG/1
40 TABLET, DELAYED RELEASE ORAL
Qty: 30 TABLET | Refills: 0 | Status: ON HOLD | OUTPATIENT
Start: 2020-12-24 | End: 2021-02-23 | Stop reason: SDUPTHER

## 2020-12-23 RX ORDER — NITROGLYCERIN 0.4 MG/1
0.4 TABLET SUBLINGUAL
Status: DISCONTINUED | OUTPATIENT
Start: 2020-12-23 | End: 2020-12-23 | Stop reason: HOSPADM

## 2020-12-23 RX ORDER — BISACODYL 10 MG
10 SUPPOSITORY, RECTAL RECTAL DAILY PRN
Status: DISCONTINUED | OUTPATIENT
Start: 2020-12-23 | End: 2020-12-23 | Stop reason: HOSPADM

## 2020-12-23 RX ORDER — LIDOCAINE 50 MG/G
1 PATCH TOPICAL DAILY
Status: DISCONTINUED | OUTPATIENT
Start: 2020-12-23 | End: 2020-12-23 | Stop reason: HOSPADM

## 2020-12-23 RX ORDER — PRASUGREL 10 MG/1
10 TABLET, FILM COATED ORAL EVERY OTHER DAY
Status: DISCONTINUED | OUTPATIENT
Start: 2020-12-24 | End: 2020-12-23 | Stop reason: HOSPADM

## 2020-12-23 RX ORDER — ACETAMINOPHEN 325 MG/1
650 TABLET ORAL EVERY 6 HOURS PRN
Status: DISCONTINUED | OUTPATIENT
Start: 2020-12-23 | End: 2020-12-23 | Stop reason: HOSPADM

## 2020-12-23 RX ORDER — ASPIRIN 81 MG/1
81 TABLET ORAL DAILY
Status: DISCONTINUED | OUTPATIENT
Start: 2020-12-23 | End: 2020-12-23 | Stop reason: HOSPADM

## 2020-12-23 RX ORDER — MAGNESIUM HYDROXIDE/ALUMINUM HYDROXICE/SIMETHICONE 120; 1200; 1200 MG/30ML; MG/30ML; MG/30ML
30 SUSPENSION ORAL EVERY 6 HOURS PRN
Status: DISCONTINUED | OUTPATIENT
Start: 2020-12-23 | End: 2020-12-23 | Stop reason: HOSPADM

## 2020-12-23 RX ORDER — POLYETHYLENE GLYCOL 3350 17 G/17G
17 POWDER, FOR SOLUTION ORAL DAILY
Status: DISCONTINUED | OUTPATIENT
Start: 2020-12-23 | End: 2020-12-23 | Stop reason: HOSPADM

## 2020-12-23 RX ORDER — ATORVASTATIN CALCIUM 20 MG/1
20 TABLET, FILM COATED ORAL
Status: DISCONTINUED | OUTPATIENT
Start: 2020-12-23 | End: 2020-12-23 | Stop reason: HOSPADM

## 2020-12-23 RX ORDER — DOCUSATE SODIUM 100 MG/1
100 CAPSULE, LIQUID FILLED ORAL 2 TIMES DAILY PRN
Status: DISCONTINUED | OUTPATIENT
Start: 2020-12-23 | End: 2020-12-23 | Stop reason: HOSPADM

## 2020-12-23 RX ORDER — FUROSEMIDE 40 MG/1
40 TABLET ORAL DAILY
Status: DISCONTINUED | OUTPATIENT
Start: 2020-12-23 | End: 2020-12-23 | Stop reason: HOSPADM

## 2020-12-23 RX ORDER — AMLODIPINE BESYLATE 10 MG/1
10 TABLET ORAL DAILY
Status: DISCONTINUED | OUTPATIENT
Start: 2020-12-24 | End: 2020-12-23 | Stop reason: HOSPADM

## 2020-12-23 RX ORDER — MELATONIN
2000 DAILY
Status: DISCONTINUED | OUTPATIENT
Start: 2020-12-23 | End: 2020-12-23 | Stop reason: HOSPADM

## 2020-12-23 RX ADMIN — LIDOCAINE 1 PATCH: 50 PATCH TOPICAL at 13:04

## 2020-12-23 RX ADMIN — POLYETHYLENE GLYCOL 3350 17 G: 17 POWDER, FOR SOLUTION ORAL at 10:31

## 2020-12-23 RX ADMIN — AMLODIPINE BESYLATE 5 MG: 5 TABLET ORAL at 13:04

## 2020-12-23 RX ADMIN — ISOSORBIDE MONONITRATE 90 MG: 60 TABLET, EXTENDED RELEASE ORAL at 10:29

## 2020-12-23 RX ADMIN — ASPIRIN 81 MG: 81 TABLET ORAL at 10:30

## 2020-12-23 RX ADMIN — DOCUSATE SODIUM 100 MG: 100 CAPSULE ORAL at 10:30

## 2020-12-23 RX ADMIN — Medication 1 TABLET: at 10:30

## 2020-12-23 RX ADMIN — Medication 2000 UNITS: at 10:31

## 2020-12-23 RX ADMIN — DOCUSATE SODIUM 100 MG: 100 CAPSULE ORAL at 10:32

## 2020-12-23 RX ADMIN — METOPROLOL SUCCINATE 50 MG: 50 TABLET, EXTENDED RELEASE ORAL at 10:33

## 2020-12-23 RX ADMIN — PANTOPRAZOLE SODIUM 40 MG: 40 TABLET, DELAYED RELEASE ORAL at 13:04

## 2020-12-23 RX ADMIN — ENOXAPARIN SODIUM 40 MG: 40 INJECTION SUBCUTANEOUS at 10:29

## 2020-12-23 RX ADMIN — FUROSEMIDE 40 MG: 40 TABLET ORAL at 10:30

## 2020-12-23 RX ADMIN — AMLODIPINE BESYLATE 5 MG: 5 TABLET ORAL at 10:31

## 2020-12-23 RX ADMIN — NITROGLYCERIN 0.4 MG: 0.4 TABLET SUBLINGUAL at 10:29

## 2020-12-24 ENCOUNTER — TRANSITIONAL CARE MANAGEMENT (OUTPATIENT)
Dept: FAMILY MEDICINE CLINIC | Facility: MEDICAL CENTER | Age: 80
End: 2020-12-24

## 2020-12-27 DIAGNOSIS — I25.10 CORONARY ARTERIOSCLEROSIS: ICD-10-CM

## 2020-12-27 RX ORDER — PRASUGREL 10 MG/1
TABLET, FILM COATED ORAL
Qty: 30 TABLET | Refills: 0 | Status: SHIPPED | OUTPATIENT
Start: 2020-12-27 | End: 2021-02-13 | Stop reason: HOSPADM

## 2021-01-05 ENCOUNTER — OFFICE VISIT (OUTPATIENT)
Dept: OBGYN CLINIC | Facility: MEDICAL CENTER | Age: 81
End: 2021-01-05
Payer: MEDICARE

## 2021-01-05 VITALS — DIASTOLIC BLOOD PRESSURE: 78 MMHG | SYSTOLIC BLOOD PRESSURE: 134 MMHG | BODY MASS INDEX: 27.19 KG/M2 | WEIGHT: 139.2 LBS

## 2021-01-05 DIAGNOSIS — N81.4 UTEROVAGINAL PROLAPSE: Primary | ICD-10-CM

## 2021-01-05 PROCEDURE — 99213 OFFICE O/P EST LOW 20 MIN: CPT | Performed by: STUDENT IN AN ORGANIZED HEALTH CARE EDUCATION/TRAINING PROGRAM

## 2021-01-06 DIAGNOSIS — I25.10 CORONARY ARTERY DISEASE INVOLVING NATIVE HEART WITHOUT ANGINA PECTORIS, UNSPECIFIED VESSEL OR LESION TYPE: Chronic | ICD-10-CM

## 2021-01-06 RX ORDER — NITROGLYCERIN 0.4 MG/1
0.4 TABLET SUBLINGUAL
Qty: 25 TABLET | Refills: 0 | Status: ON HOLD | OUTPATIENT
Start: 2021-01-06 | End: 2021-02-23 | Stop reason: SDUPTHER

## 2021-01-07 ENCOUNTER — APPOINTMENT (EMERGENCY)
Dept: CT IMAGING | Facility: HOSPITAL | Age: 81
DRG: 246 | End: 2021-01-07
Payer: MEDICARE

## 2021-01-07 ENCOUNTER — TELEPHONE (OUTPATIENT)
Dept: CARDIOLOGY CLINIC | Facility: CLINIC | Age: 81
End: 2021-01-07

## 2021-01-07 ENCOUNTER — APPOINTMENT (EMERGENCY)
Dept: RADIOLOGY | Facility: HOSPITAL | Age: 81
DRG: 246 | End: 2021-01-07
Payer: MEDICARE

## 2021-01-07 ENCOUNTER — HOSPITAL ENCOUNTER (INPATIENT)
Facility: HOSPITAL | Age: 81
LOS: 10 days | DRG: 246 | End: 2021-01-17
Attending: EMERGENCY MEDICINE | Admitting: INTERNAL MEDICINE
Payer: MEDICARE

## 2021-01-07 DIAGNOSIS — J81.1 PULMONARY EDEMA: Primary | ICD-10-CM

## 2021-01-07 DIAGNOSIS — I25.10 CORONARY ARTERY DISEASE INVOLVING NATIVE HEART WITHOUT ANGINA PECTORIS, UNSPECIFIED VESSEL OR LESION TYPE: Chronic | ICD-10-CM

## 2021-01-07 DIAGNOSIS — I50.9 CHF (CONGESTIVE HEART FAILURE) (HCC): ICD-10-CM

## 2021-01-07 DIAGNOSIS — R07.9 CHEST PAIN: ICD-10-CM

## 2021-01-07 LAB
ALBUMIN SERPL BCP-MCNC: 4.4 G/DL (ref 3.5–5)
ALP SERPL-CCNC: 81 U/L (ref 46–116)
ALT SERPL W P-5'-P-CCNC: 32 U/L (ref 12–78)
ANION GAP SERPL CALCULATED.3IONS-SCNC: 12 MMOL/L (ref 4–13)
APTT PPP: 31 SECONDS (ref 23–37)
AST SERPL W P-5'-P-CCNC: 53 U/L (ref 5–45)
BASOPHILS # BLD AUTO: 0.05 THOUSANDS/ΜL (ref 0–0.1)
BASOPHILS NFR BLD AUTO: 1 % (ref 0–1)
BILIRUB SERPL-MCNC: 0.98 MG/DL (ref 0.2–1)
BUN SERPL-MCNC: 24 MG/DL (ref 5–25)
CALCIUM SERPL-MCNC: 9.7 MG/DL (ref 8.3–10.1)
CHLORIDE SERPL-SCNC: 100 MMOL/L (ref 100–108)
CO2 SERPL-SCNC: 23 MMOL/L (ref 21–32)
CREAT SERPL-MCNC: 1.12 MG/DL (ref 0.6–1.3)
D DIMER PPP FEU-MCNC: 1.11 UG/ML FEU
EOSINOPHIL # BLD AUTO: 0.17 THOUSAND/ΜL (ref 0–0.61)
EOSINOPHIL NFR BLD AUTO: 2 % (ref 0–6)
ERYTHROCYTE [DISTWIDTH] IN BLOOD BY AUTOMATED COUNT: 13.2 % (ref 11.6–15.1)
FLUAV RNA RESP QL NAA+PROBE: NEGATIVE
FLUBV RNA RESP QL NAA+PROBE: NEGATIVE
GFR SERPL CREATININE-BSD FRML MDRD: 46 ML/MIN/1.73SQ M
GLUCOSE SERPL-MCNC: 137 MG/DL (ref 65–140)
HCT VFR BLD AUTO: 47.2 % (ref 34.8–46.1)
HGB BLD-MCNC: 15 G/DL (ref 11.5–15.4)
IMM GRANULOCYTES # BLD AUTO: 0.03 THOUSAND/UL (ref 0–0.2)
IMM GRANULOCYTES NFR BLD AUTO: 0 % (ref 0–2)
INR PPP: 0.87 (ref 0.84–1.19)
LYMPHOCYTES # BLD AUTO: 1.99 THOUSANDS/ΜL (ref 0.6–4.47)
LYMPHOCYTES NFR BLD AUTO: 26 % (ref 14–44)
MCH RBC QN AUTO: 30.1 PG (ref 26.8–34.3)
MCHC RBC AUTO-ENTMCNC: 31.8 G/DL (ref 31.4–37.4)
MCV RBC AUTO: 95 FL (ref 82–98)
MONOCYTES # BLD AUTO: 0.43 THOUSAND/ΜL (ref 0.17–1.22)
MONOCYTES NFR BLD AUTO: 6 % (ref 4–12)
NEUTROPHILS # BLD AUTO: 5.11 THOUSANDS/ΜL (ref 1.85–7.62)
NEUTS SEG NFR BLD AUTO: 65 % (ref 43–75)
NRBC BLD AUTO-RTO: 0 /100 WBCS
NT-PROBNP SERPL-MCNC: 1254 PG/ML
PLATELET # BLD AUTO: 256 THOUSANDS/UL (ref 149–390)
PMV BLD AUTO: 10.6 FL (ref 8.9–12.7)
POTASSIUM SERPL-SCNC: 5.2 MMOL/L (ref 3.5–5.3)
PROT SERPL-MCNC: 8.9 G/DL (ref 6.4–8.2)
PROTHROMBIN TIME: 11.9 SECONDS (ref 11.6–14.5)
RBC # BLD AUTO: 4.99 MILLION/UL (ref 3.81–5.12)
RSV RNA RESP QL NAA+PROBE: NEGATIVE
SARS-COV-2 RNA RESP QL NAA+PROBE: NEGATIVE
SODIUM SERPL-SCNC: 135 MMOL/L (ref 136–145)
TROPONIN I SERPL-MCNC: 0.02 NG/ML
WBC # BLD AUTO: 7.78 THOUSAND/UL (ref 4.31–10.16)

## 2021-01-07 PROCEDURE — 71045 X-RAY EXAM CHEST 1 VIEW: CPT

## 2021-01-07 PROCEDURE — 93005 ELECTROCARDIOGRAM TRACING: CPT

## 2021-01-07 PROCEDURE — 85025 COMPLETE CBC W/AUTO DIFF WBC: CPT | Performed by: EMERGENCY MEDICINE

## 2021-01-07 PROCEDURE — 84484 ASSAY OF TROPONIN QUANT: CPT | Performed by: EMERGENCY MEDICINE

## 2021-01-07 PROCEDURE — 85730 THROMBOPLASTIN TIME PARTIAL: CPT | Performed by: PHYSICIAN ASSISTANT

## 2021-01-07 PROCEDURE — 94760 N-INVAS EAR/PLS OXIMETRY 1: CPT

## 2021-01-07 PROCEDURE — 36415 COLL VENOUS BLD VENIPUNCTURE: CPT

## 2021-01-07 PROCEDURE — 99285 EMERGENCY DEPT VISIT HI MDM: CPT

## 2021-01-07 PROCEDURE — 94002 VENT MGMT INPAT INIT DAY: CPT

## 2021-01-07 PROCEDURE — 85379 FIBRIN DEGRADATION QUANT: CPT | Performed by: PHYSICIAN ASSISTANT

## 2021-01-07 PROCEDURE — 80053 COMPREHEN METABOLIC PANEL: CPT | Performed by: EMERGENCY MEDICINE

## 2021-01-07 PROCEDURE — 99285 EMERGENCY DEPT VISIT HI MDM: CPT | Performed by: PHYSICIAN ASSISTANT

## 2021-01-07 PROCEDURE — 71275 CT ANGIOGRAPHY CHEST: CPT

## 2021-01-07 PROCEDURE — 83880 ASSAY OF NATRIURETIC PEPTIDE: CPT | Performed by: PHYSICIAN ASSISTANT

## 2021-01-07 PROCEDURE — 0241U HB NFCT DS VIR RESP RNA 4 TRGT: CPT | Performed by: PHYSICIAN ASSISTANT

## 2021-01-07 PROCEDURE — 85610 PROTHROMBIN TIME: CPT | Performed by: PHYSICIAN ASSISTANT

## 2021-01-07 RX ORDER — ONDANSETRON 2 MG/ML
4 INJECTION INTRAMUSCULAR; INTRAVENOUS ONCE
Status: DISCONTINUED | OUTPATIENT
Start: 2021-01-07 | End: 2021-01-17 | Stop reason: HOSPADM

## 2021-01-07 RX ORDER — METOPROLOL SUCCINATE 50 MG/1
50 TABLET, EXTENDED RELEASE ORAL DAILY
Qty: 90 TABLET | Refills: 3 | Status: SHIPPED | OUTPATIENT
Start: 2021-01-07 | End: 2021-02-13 | Stop reason: HOSPADM

## 2021-01-07 RX ORDER — FENTANYL CITRATE 50 UG/ML
50 INJECTION, SOLUTION INTRAMUSCULAR; INTRAVENOUS ONCE
Status: DISCONTINUED | OUTPATIENT
Start: 2021-01-07 | End: 2021-01-17 | Stop reason: HOSPADM

## 2021-01-07 RX ORDER — ISOSORBIDE MONONITRATE 30 MG/1
90 TABLET, EXTENDED RELEASE ORAL DAILY
Qty: 270 TABLET | Refills: 3 | Status: SHIPPED | OUTPATIENT
Start: 2021-01-07 | End: 2021-02-13 | Stop reason: HOSPADM

## 2021-01-07 RX ORDER — NITROGLYCERIN 0.4 MG/1
0.4 TABLET SUBLINGUAL ONCE
Status: COMPLETED | OUTPATIENT
Start: 2021-01-07 | End: 2021-01-07

## 2021-01-07 RX ADMIN — IOHEXOL 100 ML: 350 INJECTION, SOLUTION INTRAVENOUS at 21:27

## 2021-01-07 RX ADMIN — NITROGLYCERIN 0.4 MG: 0.4 TABLET SUBLINGUAL at 22:01

## 2021-01-07 RX ADMIN — NITROGLYCERIN 1 INCH: 20 OINTMENT TOPICAL at 22:15

## 2021-01-08 PROBLEM — R77.8 ELEVATED TROPONIN: Status: ACTIVE | Noted: 2021-01-08

## 2021-01-08 PROBLEM — R79.89 ELEVATED TROPONIN: Status: ACTIVE | Noted: 2021-01-08

## 2021-01-08 PROBLEM — I50.9 CHF (CONGESTIVE HEART FAILURE) (HCC): Status: ACTIVE | Noted: 2021-01-08

## 2021-01-08 PROBLEM — I50.41 ACUTE COMBINED SYSTOLIC AND DIASTOLIC CONGESTIVE HEART FAILURE (HCC): Status: ACTIVE | Noted: 2021-01-08

## 2021-01-08 PROBLEM — I50.1 PULMONARY EDEMA CARDIAC CAUSE (HCC): Status: ACTIVE | Noted: 2020-12-03

## 2021-01-08 LAB
ALBUMIN SERPL BCP-MCNC: 4.2 G/DL (ref 3.5–5)
ALP SERPL-CCNC: 77 U/L (ref 46–116)
ALT SERPL W P-5'-P-CCNC: 32 U/L (ref 12–78)
ANION GAP SERPL CALCULATED.3IONS-SCNC: 14 MMOL/L (ref 4–13)
APTT PPP: 139 SECONDS (ref 23–37)
APTT PPP: 46 SECONDS (ref 23–37)
AST SERPL W P-5'-P-CCNC: 34 U/L (ref 5–45)
ATRIAL RATE: 118 BPM
ATRIAL RATE: 135 BPM
ATRIAL RATE: 94 BPM
BASOPHILS # BLD AUTO: 0.04 THOUSANDS/ΜL (ref 0–0.1)
BASOPHILS NFR BLD AUTO: 0 % (ref 0–1)
BILIRUB SERPL-MCNC: 1.08 MG/DL (ref 0.2–1)
BUN SERPL-MCNC: 19 MG/DL (ref 5–25)
CALCIUM SERPL-MCNC: 9.3 MG/DL (ref 8.3–10.1)
CHLORIDE SERPL-SCNC: 104 MMOL/L (ref 100–108)
CO2 SERPL-SCNC: 23 MMOL/L (ref 21–32)
CREAT SERPL-MCNC: 1.09 MG/DL (ref 0.6–1.3)
EOSINOPHIL # BLD AUTO: 0.02 THOUSAND/ΜL (ref 0–0.61)
EOSINOPHIL NFR BLD AUTO: 0 % (ref 0–6)
ERYTHROCYTE [DISTWIDTH] IN BLOOD BY AUTOMATED COUNT: 13.4 % (ref 11.6–15.1)
GFR SERPL CREATININE-BSD FRML MDRD: 48 ML/MIN/1.73SQ M
GLUCOSE SERPL-MCNC: 143 MG/DL (ref 65–140)
HCT VFR BLD AUTO: 45.9 % (ref 34.8–46.1)
HGB BLD-MCNC: 14.8 G/DL (ref 11.5–15.4)
IMM GRANULOCYTES # BLD AUTO: 0.05 THOUSAND/UL (ref 0–0.2)
IMM GRANULOCYTES NFR BLD AUTO: 1 % (ref 0–2)
LYMPHOCYTES # BLD AUTO: 1.51 THOUSANDS/ΜL (ref 0.6–4.47)
LYMPHOCYTES NFR BLD AUTO: 14 % (ref 14–44)
MAGNESIUM SERPL-MCNC: 2.1 MG/DL (ref 1.6–2.6)
MCH RBC QN AUTO: 30.1 PG (ref 26.8–34.3)
MCHC RBC AUTO-ENTMCNC: 32.2 G/DL (ref 31.4–37.4)
MCV RBC AUTO: 94 FL (ref 82–98)
MONOCYTES # BLD AUTO: 0.71 THOUSAND/ΜL (ref 0.17–1.22)
MONOCYTES NFR BLD AUTO: 7 % (ref 4–12)
NEUTROPHILS # BLD AUTO: 8.67 THOUSANDS/ΜL (ref 1.85–7.62)
NEUTS SEG NFR BLD AUTO: 78 % (ref 43–75)
NRBC BLD AUTO-RTO: 0 /100 WBCS
P AXIS: 71 DEGREES
P AXIS: 72 DEGREES
P AXIS: 78 DEGREES
PHOSPHATE SERPL-MCNC: 4.3 MG/DL (ref 2.3–4.1)
PLATELET # BLD AUTO: 297 THOUSANDS/UL (ref 149–390)
PMV BLD AUTO: 10.5 FL (ref 8.9–12.7)
POTASSIUM SERPL-SCNC: 3.9 MMOL/L (ref 3.5–5.3)
PR INTERVAL: 122 MS
PR INTERVAL: 126 MS
PR INTERVAL: 128 MS
PROT SERPL-MCNC: 7.9 G/DL (ref 6.4–8.2)
QRS AXIS: -32 DEGREES
QRS AXIS: 0 DEGREES
QRS AXIS: 26 DEGREES
QRSD INTERVAL: 128 MS
QRSD INTERVAL: 132 MS
QRSD INTERVAL: 138 MS
QT INTERVAL: 320 MS
QT INTERVAL: 332 MS
QT INTERVAL: 398 MS
QTC INTERVAL: 456 MS
QTC INTERVAL: 469 MS
QTC INTERVAL: 497 MS
RBC # BLD AUTO: 4.91 MILLION/UL (ref 3.81–5.12)
SODIUM SERPL-SCNC: 141 MMOL/L (ref 136–145)
T WAVE AXIS: 140 DEGREES
T WAVE AXIS: 141 DEGREES
T WAVE AXIS: 178 DEGREES
TROPONIN I SERPL-MCNC: 1.05 NG/ML
TROPONIN I SERPL-MCNC: 2.31 NG/ML
TROPONIN I SERPL-MCNC: 2.36 NG/ML
TSH SERPL DL<=0.05 MIU/L-ACNC: 1.73 UIU/ML (ref 0.36–3.74)
VENTRICULAR RATE: 113 BPM
VENTRICULAR RATE: 129 BPM
VENTRICULAR RATE: 94 BPM
WBC # BLD AUTO: 11 THOUSAND/UL (ref 4.31–10.16)

## 2021-01-08 PROCEDURE — 85730 THROMBOPLASTIN TIME PARTIAL: CPT | Performed by: CHIROPRACTOR

## 2021-01-08 PROCEDURE — 94762 N-INVAS EAR/PLS OXIMTRY CONT: CPT

## 2021-01-08 PROCEDURE — 85730 THROMBOPLASTIN TIME PARTIAL: CPT | Performed by: INTERNAL MEDICINE

## 2021-01-08 PROCEDURE — 99223 1ST HOSP IP/OBS HIGH 75: CPT | Performed by: INTERNAL MEDICINE

## 2021-01-08 PROCEDURE — 84443 ASSAY THYROID STIM HORMONE: CPT | Performed by: INTERNAL MEDICINE

## 2021-01-08 PROCEDURE — 93010 ELECTROCARDIOGRAM REPORT: CPT | Performed by: INTERNAL MEDICINE

## 2021-01-08 PROCEDURE — 84484 ASSAY OF TROPONIN QUANT: CPT | Performed by: INTERNAL MEDICINE

## 2021-01-08 PROCEDURE — 85025 COMPLETE CBC W/AUTO DIFF WBC: CPT | Performed by: INTERNAL MEDICINE

## 2021-01-08 PROCEDURE — 83735 ASSAY OF MAGNESIUM: CPT | Performed by: INTERNAL MEDICINE

## 2021-01-08 PROCEDURE — 93005 ELECTROCARDIOGRAM TRACING: CPT

## 2021-01-08 PROCEDURE — 94760 N-INVAS EAR/PLS OXIMETRY 1: CPT

## 2021-01-08 PROCEDURE — 94003 VENT MGMT INPAT SUBQ DAY: CPT

## 2021-01-08 PROCEDURE — 80053 COMPREHEN METABOLIC PANEL: CPT | Performed by: INTERNAL MEDICINE

## 2021-01-08 PROCEDURE — 84100 ASSAY OF PHOSPHORUS: CPT | Performed by: INTERNAL MEDICINE

## 2021-01-08 RX ORDER — ALBUMIN (HUMAN) 12.5 G/50ML
25 SOLUTION INTRAVENOUS ONCE
Status: DISCONTINUED | OUTPATIENT
Start: 2021-01-08 | End: 2021-01-08

## 2021-01-08 RX ORDER — SIMETHICONE 80 MG
80 TABLET,CHEWABLE ORAL 4 TIMES DAILY PRN
Status: DISCONTINUED | OUTPATIENT
Start: 2021-01-08 | End: 2021-01-17 | Stop reason: HOSPADM

## 2021-01-08 RX ORDER — LOSARTAN POTASSIUM 50 MG/1
50 TABLET ORAL DAILY
Status: DISCONTINUED | OUTPATIENT
Start: 2021-01-08 | End: 2021-01-09

## 2021-01-08 RX ORDER — HEPARIN SODIUM 10000 [USP'U]/100ML
3-20 INJECTION, SOLUTION INTRAVENOUS
Status: DISCONTINUED | OUTPATIENT
Start: 2021-01-08 | End: 2021-01-09

## 2021-01-08 RX ORDER — PRASUGREL 10 MG/1
10 TABLET, FILM COATED ORAL EVERY OTHER DAY
Status: DISCONTINUED | OUTPATIENT
Start: 2021-01-08 | End: 2021-01-11

## 2021-01-08 RX ORDER — ASPIRIN 325 MG
325 TABLET ORAL ONCE
Status: COMPLETED | OUTPATIENT
Start: 2021-01-08 | End: 2021-01-08

## 2021-01-08 RX ORDER — ATORVASTATIN CALCIUM 20 MG/1
20 TABLET, FILM COATED ORAL
Status: DISCONTINUED | OUTPATIENT
Start: 2021-01-08 | End: 2021-01-17 | Stop reason: HOSPADM

## 2021-01-08 RX ORDER — POLYETHYLENE GLYCOL 3350 17 G/17G
17 POWDER, FOR SOLUTION ORAL DAILY
Status: DISCONTINUED | OUTPATIENT
Start: 2021-01-08 | End: 2021-01-14

## 2021-01-08 RX ORDER — NITROGLYCERIN 0.4 MG/1
0.4 TABLET SUBLINGUAL
Status: DISCONTINUED | OUTPATIENT
Start: 2021-01-08 | End: 2021-01-17 | Stop reason: HOSPADM

## 2021-01-08 RX ORDER — AMLODIPINE BESYLATE 10 MG/1
10 TABLET ORAL DAILY
Status: DISCONTINUED | OUTPATIENT
Start: 2021-01-08 | End: 2021-01-09

## 2021-01-08 RX ORDER — ASPIRIN 81 MG/1
81 TABLET ORAL DAILY
Status: DISCONTINUED | OUTPATIENT
Start: 2021-01-09 | End: 2021-01-10

## 2021-01-08 RX ORDER — RANOLAZINE 500 MG/1
1000 TABLET, EXTENDED RELEASE ORAL 2 TIMES DAILY
Status: DISCONTINUED | OUTPATIENT
Start: 2021-01-08 | End: 2021-01-17 | Stop reason: HOSPADM

## 2021-01-08 RX ORDER — HEPARIN SODIUM 1000 [USP'U]/ML
3900 INJECTION, SOLUTION INTRAVENOUS; SUBCUTANEOUS
Status: DISCONTINUED | OUTPATIENT
Start: 2021-01-08 | End: 2021-01-09

## 2021-01-08 RX ORDER — ONDANSETRON 2 MG/ML
4 INJECTION INTRAMUSCULAR; INTRAVENOUS EVERY 6 HOURS PRN
Status: DISCONTINUED | OUTPATIENT
Start: 2021-01-08 | End: 2021-01-17 | Stop reason: HOSPADM

## 2021-01-08 RX ORDER — OXYCODONE HYDROCHLORIDE 5 MG/1
2.5 TABLET ORAL EVERY 4 HOURS PRN
Status: DISCONTINUED | OUTPATIENT
Start: 2021-01-08 | End: 2021-01-17 | Stop reason: HOSPADM

## 2021-01-08 RX ORDER — HEPARIN SODIUM 1000 [USP'U]/ML
1950 INJECTION, SOLUTION INTRAVENOUS; SUBCUTANEOUS
Status: DISCONTINUED | OUTPATIENT
Start: 2021-01-08 | End: 2021-01-09

## 2021-01-08 RX ORDER — ACETAMINOPHEN 325 MG/1
650 TABLET ORAL EVERY 4 HOURS PRN
Status: DISCONTINUED | OUTPATIENT
Start: 2021-01-08 | End: 2021-01-17 | Stop reason: HOSPADM

## 2021-01-08 RX ORDER — FUROSEMIDE 10 MG/ML
40 INJECTION INTRAMUSCULAR; INTRAVENOUS
Status: DISCONTINUED | OUTPATIENT
Start: 2021-01-08 | End: 2021-01-09

## 2021-01-08 RX ORDER — METOPROLOL SUCCINATE 50 MG/1
50 TABLET, EXTENDED RELEASE ORAL DAILY
Status: DISCONTINUED | OUTPATIENT
Start: 2021-01-08 | End: 2021-01-13

## 2021-01-08 RX ORDER — DOCUSATE SODIUM 100 MG/1
100 CAPSULE, LIQUID FILLED ORAL 2 TIMES DAILY
Status: DISCONTINUED | OUTPATIENT
Start: 2021-01-08 | End: 2021-01-11

## 2021-01-08 RX ORDER — FUROSEMIDE 10 MG/ML
40 INJECTION INTRAMUSCULAR; INTRAVENOUS 2 TIMES DAILY
Status: DISCONTINUED | OUTPATIENT
Start: 2021-01-08 | End: 2021-01-08

## 2021-01-08 RX ORDER — SENNOSIDES 8.6 MG
8.6 TABLET ORAL
Status: DISCONTINUED | OUTPATIENT
Start: 2021-01-08 | End: 2021-01-14

## 2021-01-08 RX ORDER — PANTOPRAZOLE SODIUM 40 MG/1
40 TABLET, DELAYED RELEASE ORAL
Status: DISCONTINUED | OUTPATIENT
Start: 2021-01-08 | End: 2021-01-11

## 2021-01-08 RX ORDER — FUROSEMIDE 10 MG/ML
40 INJECTION INTRAMUSCULAR; INTRAVENOUS ONCE
Status: COMPLETED | OUTPATIENT
Start: 2021-01-08 | End: 2021-01-08

## 2021-01-08 RX ORDER — ASPIRIN 81 MG/1
81 TABLET ORAL DAILY
Status: DISCONTINUED | OUTPATIENT
Start: 2021-01-08 | End: 2021-01-08

## 2021-01-08 RX ADMIN — PRASUGREL 10 MG: 10 TABLET, FILM COATED ORAL at 09:51

## 2021-01-08 RX ADMIN — ATORVASTATIN CALCIUM 20 MG: 20 TABLET, FILM COATED ORAL at 17:52

## 2021-01-08 RX ADMIN — FUROSEMIDE 40 MG: 10 INJECTION, SOLUTION INTRAMUSCULAR; INTRAVENOUS at 01:58

## 2021-01-08 RX ADMIN — DOCUSATE SODIUM 100 MG: 100 CAPSULE, LIQUID FILLED ORAL at 09:51

## 2021-01-08 RX ADMIN — FUROSEMIDE 40 MG: 10 INJECTION, SOLUTION INTRAMUSCULAR; INTRAVENOUS at 12:46

## 2021-01-08 RX ADMIN — ASPIRIN 325 MG ORAL TABLET 325 MG: 325 PILL ORAL at 05:40

## 2021-01-08 RX ADMIN — METOPROLOL SUCCINATE 50 MG: 50 TABLET, EXTENDED RELEASE ORAL at 09:51

## 2021-01-08 RX ADMIN — DOCUSATE SODIUM 100 MG: 100 CAPSULE, LIQUID FILLED ORAL at 17:49

## 2021-01-08 RX ADMIN — HEPARIN SODIUM 12 UNITS/KG/HR: 10000 INJECTION, SOLUTION INTRAVENOUS at 05:53

## 2021-01-08 RX ADMIN — ISOSORBIDE MONONITRATE 90 MG: 30 TABLET, EXTENDED RELEASE ORAL at 09:51

## 2021-01-08 RX ADMIN — AMLODIPINE BESYLATE 10 MG: 10 TABLET ORAL at 09:52

## 2021-01-08 RX ADMIN — NITROGLYCERIN 0.4 MG: 0.4 TABLET SUBLINGUAL at 00:58

## 2021-01-08 RX ADMIN — RANOLAZINE 1000 MG: 500 TABLET, FILM COATED, EXTENDED RELEASE ORAL at 17:52

## 2021-01-08 RX ADMIN — RANOLAZINE 1000 MG: 500 TABLET, FILM COATED, EXTENDED RELEASE ORAL at 09:52

## 2021-01-08 RX ADMIN — LOSARTAN POTASSIUM 50 MG: 50 TABLET, FILM COATED ORAL at 12:46

## 2021-01-08 RX ADMIN — PANTOPRAZOLE SODIUM 40 MG: 40 TABLET, DELAYED RELEASE ORAL at 05:57

## 2021-01-08 RX ADMIN — SENNOSIDES 8.6 MG: 8.6 TABLET ORAL at 21:35

## 2021-01-08 NOTE — QUICK NOTE
[place]  Jono Confer in depth  He was very pleased we updated him on Verito's progress  Discussed cardiology repoprt as well  Prateek Cabrera MD PGY-1 FM

## 2021-01-08 NOTE — H&P
H&P- Freddie Huggins 1940, 79 y o  female MRN: [id number]    Unit/Bed#: S -01 Encounter: [id number]    Primary Care Provider: Cristina Merida MD   Date and time admitted to hospital: 1/7/2021  7:41 PM        * Acute combined systolic and diastolic congestive heart failure ([address] )  Assessment & Plan  Wt Readings from Last 3 Encounters:   01/07/21 63 5 kg (140 lb)   01/05/21 63 1 kg (139 lb 3 2 oz)   12/23/20 62 4 kg (137 lb 9 1 oz)       Presents with increased shortness of breath, dyspnea on exertion and lower extremity edema  08/0 ECHO: Systolic function was mildly reduced, Ejection fraction was estimated to be 45 %  There was mild diffuse hypokinesis (grade 1 diastolic dysfunction)  Workup:  Lab Results   Component Value Date    NTBNP 1,254 (H) 01/07/2021    NTBNP 958 (H) 12/02/2020     o Takes Lasix 40 mg p o  Daily   Physical exam: Patient is currently volume overloaded  Crackles bilateral lung   CTA: Findings suggestive of mild congestive heart failure and 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        Pulmonary edema cardiac cause [facility])  Assessment & Plan  Wt Readings from Last 3 Encounters:   01/07/21 63 5 kg (140 lb)   01/05/21 63 1 kg (139 lb 3 2 oz)   12/23/20 62 4 kg (137 lb 9 1 oz)     When patient was laid flat in the ER went on flash pulmonary edema    · Was immediately placed on BiPAP 14/6, will keep on BiPAP while diuressing        Chest pain  Assessment & Plan    Chest pain located in the right radiating to both arms  Pain is described as squeezing, tearing his chest apart  and rated 10/10  Alleviating factors: Nitroglycerin and rest  Worsened by: deep inspiration, emotional stress and walking    CLARE score of 4  Workup:   CXR:  No official results looks congestion   CT of the chest showed mild congestion    Recent Labs     01/07/21 2005 01/08/21  0122   TROPONINI 0 02 1 05*     Assessment:  Chest pain with elevated troponin rule out ACS is versus type 2 MI due to cardiac demand of acute CHF/pulmonary edema, due to patient's significant history p m   Previous admission, will treat as ACS     Plan:    STAT EKG and troponin for chest pain   Telemetry to monitor for arrhythmias   EKG sinus tach with left bundle branch block some ST changes, no noticeable ST elevation   Heparin drip ACS protocol   Plan discussed with cardiology on call with EKG   Labs:  o Repeat troponin q3h x 3 or until peak  o Lipid panel was recently done   Meds:  o Nitroglycerin p r n   o ASA 81 mg  o Continue beta-blocker, continue Lipitor  o Will place patient on Tylenol p r n   o Voltaren gel on the chest  o Oxycodone for moderate pain  o Morphine for severe pain   Consult cardiology for further workup and possible stress test      Elevated troponin  Assessment & Plan  Second set of troponin positive, patient does not complain of chest pain right now, usually complains of chest pain during activity    Assessment:  Elevated troponin rule out NSTEMI versus type 2 MI cardiac demand from CHF/pulmonary edema  · See plan above    Hyperlipidemia  Assessment & Plan  Continue Lipitor    CAD (coronary artery disease)  Assessment & Plan  · Continue Lipitor  · Isosorbide mononitrate 90 mg daily  · Metoprolol 24 hour tablet 50 mg daily  · Effient 10 mg every other day, this was confirmed with prescription  · Ranexa 1000 mg b i d       VTE Prophylaxis: Enoxaparin (Lovenox)  / sequential compression device   Code Status:  Full code  POLST: POLST form is not discussed and not completed at this time  Anticipated Length of Stay:  Patient will be admitted on an Inpatient basis with an anticipated length of stay of   2 midnights  Justification for Hospital Stay: acute CHF    Chief Complaint:       History of Present Illness:    Arthur Villa is a 79 y o  female who presents with chest pain with SOB  Past medical history with CAD s/p CABG and LAMINE, HTN, HLD, and cognitive changes  Patient was seen in [facility] and was admitted from 12/2-12/5/2020  for CP/angina and suspected Type 1 NSTEMI which was managed medically with nitro and heparin GTT and adjusting antianginal regimen, was placed on Lasix for acute pulmonary edema  Patient was again seen on 12/23/20, for chest pain, ECG on admission revealed LBBB  Trops were trended 0 07, 0 65, and was discharged the same day  Cardiology saw patient and recommended increase her amlodipine from 5mg to 10mg and start PPI  Close follow up with her PCP and Cardiologist recommended  From discharge was having persistent Chest pain around three times a  day, usually happens with activity but happens during eating and would sometime wake her up in the middle of the night while sleeping, would take  Nitroglycerin x2 and pain will be relieved in 20 minutes  Patient report that they were using it so many times that they ran out  5pm today had chest pain unrelieved by two nitroglycerin describe as squeezing or pulling her chest apart, 10/10, worst with activity, doesn't seem to radiate worsen with breathing and accompanied with SOB, patient told us it has been months since she was able to lay flat  Patient also has some leg swelling    According to the  patient is compliant with all her medication, unfortunately the diet not so much, since he is not a great cook, he does not keep an eye on sodium intake  Patient was taken in ER  While patient was in the ER was laid flat, started have significant shortness of breath  Was placed on BiPAP  Review of Systems:    Review of Systems   Respiratory: Positive for chest tightness and shortness of breath  Cardiovascular: Positive for chest pain and leg swelling  Neurological: Positive for weakness  All other systems reviewed and are negative  Past Medical and Surgical History:     Past Medical History:   Diagnosis Date    Anal fissure     Cardiac disorder     Esophageal reflux     Esophagitis, reflux     Hemorrhoids     Hepatic hemangioma     Last Assessed: 1/13/2015    Herpes zoster     History of colonic polyps     Hypertension     [facility]schemic colitis [facility] )     Lumbar herniated disc     [facility]     Nephrolithiasis     L  Lithotripsy    Nontoxic single thyroid nodule     Last Assessed: 1/13/2015    Osteoarthritis     Overactive bladder     Raynaud disease     Respiratory system disease     Sjogren's disease ([facility] )     Spinal stenosis     PONCHO (stress urinary incontinence, female)     Uterovaginal prolapse     Grade I-II       Past Surgical History:   Procedure Laterality Date    APPENDECTOMY  1947    CARDIAC SURGERY      CABG    CATARACT EXTRACTION Bilateral     COLONOSCOPY  2012    Fiberoptic    COLONOSCOPY      Resolved: 2006 - 2012 5 year f/u    CORONARY ANGIOPLASTY WITH STENT PLACEMENT      CORONARY ARTERY BYPASS GRAFT      Resolved: 2012    ESOPHAGOGASTRODUODENOSCOPY  2012    Diagnostic    HEMORROIDECTOMY      KNEE SURGERY      LITHOTRIPSY      Renal    MALIGNANT SKIN LESION EXCISION      Face; Resolved: 2004    MA ESOPHAGOGASTRODUODENOSCOPY TRANSORAL DIAGNOSTIC N/A 4/13/2016    Procedure: EGD AND COLONOSCOPY;  Surgeon: Jose Arana MD;  [facility];   Service: Gastroenterology    RENAL ARTERY STENT      SKIN LESION EXCISION      Scalp    SOFT TISSUE TUMOR RESECTION      Shoulder; Resolved: 1995    THROMBOLYSIS      Postoperative Thrombolysis PTCA    TONSILLECTOMY      Resolved: 1944       Meds/Allergies:    Prior to Admission medications    Medication Sig Start Date End Date Taking? Authorizing Provider   amLODIPine (NORVASC) 10 mg tablet Take 1 tablet (10 mg total) by mouth daily 12/24/20  Yes Richards [place], PA-C   aspirin (ECOTRIN LOW STRENGTH) 81 mg EC tablet Take 81 mg by mouth daily  Yes Historical Provider, MD   atorvastatin (LIPITOR) 20 mg tablet Take 1 tablet (20 mg total) by mouth daily with dinner 12/5/20  Yes Lue Hazard, DO   docusate sodium (Colace) 100 mg capsule Take 1 capsule (100 mg total) by mouth 2 (two) times a day 12/5/20  Yes Lue Hazard, DO   furosemide (LASIX) 40 mg tablet Take 1 tablet (40 mg total) by mouth daily 12/6/20  Yes Lue Hazard, DO   isosorbide mononitrate (IMDUR) 30 mg 24 hr tablet Take 3 tablets (90 mg total) by mouth daily 1/7/21  Yes Gus Shields MD   metoprolol succinate (TOPROL-XL) 50 mg 24 hr tablet Take 1 tablet (50 mg total) by mouth daily 1/7/21  Yes Gus Shields MD   multivitamin [facility]) TABS Take 1 tablet by mouth daily     Yes Historical Provider, MD   nitroglycerin (NITROSTAT) 0 4 mg SL tablet PLACE 1 TABLET (0 4 MG TOTAL) UNDER THE TONGUE EVERY 5 (FIVE) MINUTES AS NEEDED FOR CHEST PAIN 1/6/21  Yes Gus Shields MD   pantoprazole (PROTONIX) 40 mg tablet Take 1 tablet (40 mg total) by mouth daily in the early morning 12/24/20  Yes [place], PA-C   polyethylene glycol (MIRALAX) 17 g packet Take 17 g by mouth daily 12/6/20  Yes Lue Hazard, DO   prasugrel (EFFIENT) tablet TAKE 1 TABLET EVERY OTHER DAY 12/27/20  Yes Gus Shields MD   ranolazine (RANEXA) 1000 MG SR tablet Take 1,000 mg by mouth 2 (two) times a day   Yes Historical Provider, MD   senna (SENOKOT) 8 6 mg Take 1 tablet (8 6 mg total) by mouth daily at bedtime 12/5/20  Yes Luz Rolon, DO     I have reviewed home medications with patient family member  Allergies: Allergies   Allergen Reactions    Sulfa Antibiotics Anaphylaxis    Formaldehyde     Codeine Palpitations       Social History:     Marital Status: /Civil Union     Substance Use History:   Social History     Substance and Sexual Activity   Alcohol Use Yes    Frequency: Monthly or less    Comment: social     Social History     Tobacco Use   Smoking Status Never Smoker   Smokeless Tobacco Never Used     Social History     Substance and Sexual Activity   Drug Use No       Family History:    Family History   Problem Relation Age of Onset    Heart disease Mother     Hypertension Mother     Osteoporosis Mother     Heart disease Father     Hypertension Father     Ulcerative colitis Family     Colon polyps Family        Physical Exam:     Vitals:   Blood Pressure: 118/80 (01/08/21 0045)  Pulse: (!) 111 (01/08/21 0042)  Temperature: 98 4 °F (36 9 °C) (01/08/21 0042)  Temp Source: Oral (01/08/21 0042)  Respirations: 18 (01/08/21 0045)  Height: 5' (152 4 cm) (01/07/21 2045)  Weight - Scale: 63 5 kg (140 lb) (01/07/21 2045)  SpO2: 98 % (01/08/21 0338)    Physical Exam  Vitals signs and nursing note reviewed  Constitutional:       Appearance: Normal appearance  HENT:      Head: Normocephalic and atraumatic  Nose: Nose normal       Mouth/Throat:      Mouth: Mucous membranes are moist    Eyes:      Extraocular Movements: Extraocular movements intact  Conjunctiva/sclera: Conjunctivae normal       Pupils: Pupils are equal, round, and reactive to light  Neck:      Musculoskeletal: Normal range of motion  Cardiovascular:      Rate and Rhythm: Normal rate and regular rhythm  Pulses: Normal pulses  Heart sounds: Normal heart sounds  Pulmonary:      Effort: Pulmonary effort is normal       Breath sounds: Rales (Bilateral lungs on the base) present        Comments: Lung sounds clear in  upper  Abdominal:      General: Abdomen is flat  Bowel sounds are normal       Palpations: Abdomen is soft  Musculoskeletal: Normal range of motion  Right lower leg: Edema present  Left lower leg: Edema present  Skin:     General: Skin is warm and dry  Capillary Refill: Capillary refill takes less than 2 seconds  Neurological:      General: No focal deficit present  Mental Status: She is alert  Comments: Person, place, at 1st made a mistake with time but corrected easily   Psychiatric:         Mood and Affect: Mood normal       Comments: Patient anxious that her  is going home           Additional Data:     Lab Results: I have personally reviewed pertinent reports  Results from last 7 days   Lab Units 01/07/21 2014   WBC Thousand/uL 7 78   HEMOGLOBIN g/dL 15 0   HEMATOCRIT % 47 2*   PLATELETS Thousands/uL 256   NEUTROS PCT % 65   LYMPHS PCT % 26   MONOS PCT % 6   EOS PCT % 2     Results from last 7 days   Lab Units 01/07/21 2005   POTASSIUM mmol/L 5 2   CHLORIDE mmol/L 100   CO2 mmol/L 23   BUN mg/dL 24   CREATININE mg/dL 1 12   CALCIUM mg/dL 9 7   ALK PHOS U/L 81   ALT U/L 32   AST U/L 53*     Results from last 7 days   Lab Units 01/07/21 2014   INR  0 87       Imaging: I have personally reviewed pertinent reports  Xr Chest Portable    Result Date: 12/23/2020  Narrative: CHEST INDICATION:   CP  COMPARISON:  12/2/2020 EXAM PERFORMED/VIEWS:  XR CHEST PORTABLE FINDINGS:  There is aortic knob calcification  Cardiomediastinal silhouette appears unremarkable  Status post CABG  There is mild pulmonary vascular congestion  Sternal wires are present  Visualized osseous structures appear otherwise unremarkable for the patient's age  Impression: Mild pulmonary vascular congestion   Workstation performed: [id number]     Radha Barton Chest Pe Study    Result Date: 1/7/2021  Narrative: CTA - CHEST WITH IV CONTRAST - PULMONARY ANGIOGRAM INDICATION:   Tachycardia, chest and back pain  COMPARISON: 10/30/2014  TECHNIQUE: CTA examination of the chest was performed using angiographic technique according to a protocol specifically tailored to evaluate for pulmonary embolism  Axial, sagittal, and coronal 2D reformatted images were created from the source data and  submitted for interpretation  In addition, coronal 3D MIP postprocessing was performed on the acquisition scanner  Radiation dose length product (DLP) for this visit:  162 mGy-cm   This examination, like all CT scans performed in the [facility], was performed utilizing techniques to minimize radiation dose exposure, including the use of iterative reconstruction and automated exposure control  IV Contrast:  100 mL of iohexol (OMNIPAQUE)  FINDINGS: PULMONARY ARTERIAL TREE:  No evidence of filling defect in the visualized pulmonary arteries to suggest acute embolus  LUNGS:  Minimal pulmonary interstitial edema at the lung bases  Subtle groundglass opacification at the lung bases and throughout the lungs in a bronchovascular distribution also suggests pulmonary alveolar edema  There is no tracheal or endobronchial lesion  PLEURA:  No effusion  HEART/GREAT VESSELS:  Cardiomegaly  Postsurgical change from CABG  No thoracic aortic aneurysm  Multifocal calcified plaque at the left subclavian artery origin and proximal vessel resulting in mild to moderate (50-60%) stenosis  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  Calcified plaque at the left common carotid  artery origin results in mild to moderate stenosis  Small hiatal hernia  MEDIASTINUM AND ALIA:  No lymphadenopathy  CHEST WALL AND LOWER NECK:   Postsurgical change from median sternotomy  VISUALIZED STRUCTURES IN THE UPPER ABDOMEN:  Stable severe stenosis at the celiac axis origin  Stable severe stenoses at the renal artery origins  OSSEOUS STRUCTURES:  No acute fracture or destructive osseous lesion  Impression: 1  Findings suggestive of mild congestive heart failure  2   No evidence of acute pulmonary embolus  3   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

## 2021-01-08 NOTE — ASSESSMENT & PLAN NOTE
· Continue Lipitor  · Isosorbide mononitrate 90 mg daily  · Metoprolol 24 hour tablet 50 mg daily  · Effient 10 mg every other day, this was confirmed with prescription  · Ranexa 1000 mg b i d

## 2021-01-08 NOTE — ASSESSMENT & PLAN NOTE
Wt Readings from Last 3 Encounters:   01/07/21 63 5 kg (140 lb)   01/05/21 63 1 kg (139 lb 3 2 oz)   12/23/20 62 4 kg (137 lb 9 1 oz)     When patient was laid flat in the ER went on flash pulmonary edema    · Was immediately placed on BiPAP 14/6, will keep on BiPAP while diuressing

## 2021-01-08 NOTE — ASSESSMENT & PLAN NOTE
Chest pain located in the right radiating to both arms  Pain is described as squeezing, tearing his chest apart  and rated 10/10  Alleviating factors: Nitroglycerin and rest  Worsened by: deep inspiration, emotional stress and walking    CLARE score of 4  Workup:   CXR:  No official results looks congestion   CT of the chest showed mild congestion    Recent Labs     01/07/21 2005   TROPONINI 0 02     Assessment:  This is likely unstable angina versus acute CHF cause muscle fatigue, as chest pain improved with BiPAP and reducible tenderness on the chest     Plan:    STAT EKG and troponin for chest pain   Telemetry to monitor for arrhythmias   EKG sinus tach with left bundle branch block some ST changes, no noticeable ST elevation   Labs:  o Repeat troponin q3h x 3 or until peak  o Lipid panel was recently done   Meds:  o Nitroglycerin p r n   o ASA 81 mg  o Continue beta-blocker, continue Lipitor  o Will place patient on Tylenol p r n   o Voltaren gel on the chest  o Oxycodone for moderate pain  o Morphine for severe pain   Consult cardiology for further workup and possible stress test

## 2021-01-08 NOTE — CASE MANAGEMENT
LOS 1 day, not a bundle or readmission  Patient is a low risk for readmission with risk for readmission score of 16  Patient is alert and oriented x 4  CM met with patient to introduce self, explain role, complete CM assessment, and discuss DC planning  Patient resides with her  Girish Quispe in a ranch home with basement (pateint reports basement is for storage and she does not use) with 2 LUDWIN  Patient functioned independent PTA with no use of DME for ambulation  Patient does her own cooking, cleaning, and medication management  Patient reports her shower has a built in seat and grab bars in the bathroom that she utilizes  Patient has a walker and cane at home to use if needed  Hx HHC but unsure what agency  No Hx STR  No Hx MH or substance use  Patient is retired and source of income social security  PCP is Dr Katlin Butler, patient has prescription coverage, and prefers using [facility] for medications  Patient drives and reports spouse to transport at DC  Patient reports DC plan is to return home upon medical stability and spouse to transport  Patient reports no concerns from CM standpoint at this time  CM encouraged patient to reach out with any questions or concerns  CM will continue to follow

## 2021-01-08 NOTE — ASSESSMENT & PLAN NOTE
Initial chest pain located in the right radiating to both arms  Pain was described as squeezing, tearing and rated 10/10  Alleviating factors: Nitroglycerin and rest  Worsened by: deep inspiration, emotional stress and walking    CLARE score of 4  Workup:   CXR:  No official results looks congestion   CT of the chest showed mild congestion    Recent Labs     01/07/21 2005 01/08/21  0122   TROPONINI 0 02 1 05*     Assessment:  Chest pain with elevated troponin rule out ACS is versus type 2 MI due to cardiac demand of acute CHF/pulmonary edema, due to patient's significant history p m   Previous admission, will treat as ACS     Plan:    STAT EKG and troponin for chest pain   Telemetry to monitor for arrhythmias   EKG sinus tach with left bundle branch block some ST changes, no noticeable ST elevation   Heparin drip ACS protocol   Labs:  o Repeat troponin q3h x 3 or until peak  o Lipid panel was recently done   Meds:  o Nitroglycerin p r n   o ASA 81 mg  o Continue beta-blocker, continue Lipitor  o Will place patient on Tylenol p r n   o Voltaren gel on the chest  o Oxycodone for moderate pain  o Morphine for severe pain   Consult cardiology for further workup and possible stress test

## 2021-01-08 NOTE — PROGRESS NOTES
Progress Note - Juany New 1940, 79 y o  female MRN: [id number]    Unit/Bed#: S -01 Encounter: [id number]    Primary Care Provider: Ayse Anaya MD   Date and time admitted to hospital: 1/7/2021  7:41 PM        Elevated troponin  Assessment & Plan  Second set of troponin positive    Assessment:  Elevated troponin rule out NSTEMI versus type 2 MI cardiac demand from CHF/pulmonary edema  · See plan above    Pulmonary edema cardiac cause [facility])  Assessment & Plan  Wt Readings from Last 3 Encounters:   01/07/21 63 5 kg (140 lb)   01/05/21 63 1 kg (139 lb 3 2 oz)   12/23/20 62 4 kg (137 lb 9 1 oz)     When patient was laid flat in the ER went on flash pulmonary edema    · Was immediately placed on BiPAP 14/6, will keep on BiPAP while diuressing        Chest pain  Assessment & Plan    Initial chest pain located in the right radiating to both arms  Pain was described as squeezing, tearing and rated 10/10  Alleviating factors: Nitroglycerin and rest  Worsened by: deep inspiration, emotional stress and walking    CLARE score of 4  Workup:   CXR:  No official results looks congestion   CT of the chest showed mild congestion    Recent Labs     01/07/21 2005 01/08/21  0122   TROPONINI 0 02 1 05*     Assessment:  Chest pain with elevated troponin rule out ACS is versus type 2 MI due to cardiac demand of acute CHF/pulmonary edema, due to patient's significant history p m   Previous admission, will treat as ACS     Plan:    STAT EKG and troponin for chest pain   Telemetry to monitor for arrhythmias   EKG sinus tach with left bundle branch block some ST changes, no noticeable ST elevation   Heparin drip ACS protocol   Labs:  o Repeat troponin q3h x 3 or until peak  o Lipid panel was recently done   Meds:  o Nitroglycerin p r n   o ASA 81 mg  o Continue beta-blocker, continue Lipitor  o Will place patient on Tylenol p r n   o Voltaren gel on the chest  o Oxycodone for moderate pain  o Morphine for severe pain   Consult cardiology for further workup and possible stress test      Hyperlipidemia  Assessment & Plan  Continue Lipitor    CAD (coronary artery disease)  Assessment & Plan  · Continue Lipitor  · Isosorbide mononitrate 90 mg daily  · Metoprolol 24 hour tablet 50 mg daily  · Effient 10 mg every other day, this was confirmed with prescription  · Ranexa 1000 mg b i d     * Acute combined systolic and diastolic congestive heart failure (HCC)  Assessment & Plan  Wt Readings from Last 3 Encounters:   01/07/21 63 5 kg (140 lb)   01/05/21 63 1 kg (139 lb 3 2 oz)   12/23/20 62 4 kg (137 lb 9 1 oz)       Presented with increased shortness of breath, dyspnea on exertion and lower extremity edema  92/4 ECHO: Systolic function was mildly reduced, Ejection fraction was estimated to be 45 %  There was mild diffuse hypokinesis (grade 1 diastolic dysfunction)  Workup:  Lab Results   Component Value Date    NTBNP 1,254 (H) 01/07/2021    NTBNP 958 (H) 12/02/2020     o Takes Lasix 40 mg p o  Daily     CTA: Findings suggestive of mild congestive heart failure and 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              VTE Pharmacologic Prophylaxis:   Pharmacologic: Heparin  Mechanical VTE Prophylaxis in Place: Yes    Discussions with Specialists or Other Care Team Provider: Dr Jaymie Romano    Education and Discussions with Family / Patient: Yes    Current Length of Stay: 1 day(s)    Current Patient Status: Inpatient     Discharge Plan / Estimated Discharge Date: Unknown  Code Status: Level 1 - Full Code      Subjective: The patient was seen at the bedside this morning which she was asleep on BiPAP  She was arousable, with some effort, and when asked, stated she was in no pain  She did not appear to be in any acute distress    Objective:     Vitals:   Temp (24hrs), Av 3 °F (36 8 °C), Min:97 6 °F (36 4 °C), Max:98 8 °F (37 1 °C)    Temp:  [97 6 °F (36 4 °C)-98 8 °F (37 1 °C)] 98 8 °F (37 1 °C)  HR:  [] 85  Resp:  [18-22] 19  BP: (111-171)/(55-93) 130/58  SpO2:  [91 %-100 %] 98 %  Body mass index is 27 34 kg/m²  Input and Output Summary (last 24 hours): Intake/Output Summary (Last 24 hours) at 2021 1100  Last data filed at 2021 0953  Gross per 24 hour   Intake    Output 575 ml   Net -575 ml       Physical Exam:     Physical Exam  Constitutional:       Comments: The patient was recumbent and asleep  HENT:      Head: Atraumatic  Cardiovascular:      Rate and Rhythm: Normal rate and regular rhythm  Heart sounds: No murmur  Pulmonary:      Comments: Generally diminished breath sounds as best as could be auscultated on BiPAP  Abdominal:      Palpations: Abdomen is soft  Musculoskeletal:         General: Swelling present  Deformity: Minimal bilateral ankle edema noted  Skin:     General: Skin is warm and dry  Neurological:      Comments: Unable to accurately assess             Additional Data:     Labs:    Results from last 7 days   Lab Units 21  0459   WBC Thousand/uL 11 00*   HEMOGLOBIN g/dL 14 8   HEMATOCRIT % 45 9   PLATELETS Thousands/uL 297   NEUTROS PCT % 78*   LYMPHS PCT % 14   MONOS PCT % 7   EOS PCT % 0     Results from last 7 days   Lab Units 21  0503   POTASSIUM mmol/L 3 9   CHLORIDE mmol/L 104   CO2 mmol/L 23   BUN mg/dL 19   CREATININE mg/dL 1 09   CALCIUM mg/dL 9 3   ALK PHOS U/L 77   ALT U/L 32   AST U/L 34     Results from last 7 days   Lab Units 01/07/21 2014   INR  0 87       * I Have Reviewed All Lab Data Listed Above  * Additional Pertinent Lab Tests Reviewed:  All Labs Within Last 24 Hours Reviewed    Imaging:    Imaging Reports Reviewed Today Include:  CTA chest and CXRs dated 01/07/2021      Recent Cultures (last 7 days):           Last 24 Hours Medication List:   Current Facility-Administered Medications   Medication Dose Route Frequency Provider Last Rate    acetaminophen  650 mg Oral Q4H PRN Ani Vance MD      amLODIPine  10 mg Oral Daily Ani Vance MD      [START ON 1/9/2021] aspirin  81 mg Oral Daily Ani Vance MD      atorvastatin  20 mg Oral Daily With Dinner Ani Vance MD      Diclofenac Sodium  2 g Topical 4x Daily Ani Vance MD      docusate sodium  100 mg Oral BID Ani Vance MD      fentanyl citrate (PF)  50 mcg Intravenous Once Be Beth PA-C      furosemide  40 mg Intravenous TID (diuretic) KATHY Renteria      heparin (porcine)  3-20 Units/kg/hr (Order-Specific) Intravenous Titrated Ani Vance MD 12 Units/kg/hr (01/08/21 0553)    heparin (porcine)  1,950 Units Intravenous Q1H PRN Ani Vance MD      heparin (porcine)  3,900 Units Intravenous Q1H PRN Ani Vance MD      isosorbide mononitrate  90 mg Oral Daily Ani Vance MD      losartan  50 mg Oral Daily KATHY Renteria      metoprolol succinate  50 mg Oral Daily Ani Vance MD      morphine injection  1 mg Intravenous Q4H PRN Ani Vance MD      nitroglycerin  0 4 mg Sublingual Q5 Min PRN Ani Vance MD      ondansetron  4 mg Intravenous Once Cesar Mao PA-C      ondansetron  4 mg Intravenous Q6H PRN Ani Vance MD      oxyCODONE  2 5 mg Oral Q4H PRN MD Buddy Bonilla pantoprazole  40 mg Oral Early Morning Corinne West MD      polyethylene glycol  17 g Oral Daily Corinne West MD      prasugrel  10 mg Oral Every Other Day Corinne West MD      ranolazine  1,000 mg Oral BID Corinne West MD      senna  8 6 mg Oral HS Corinne West MD      simethicone  80 mg Oral 4x Daily PRN Corinne West MD          Today, Patient Was Seen By: Dane Jhaveri MD    ** Please Note: This note has been constructed using a voice recognition system   **

## 2021-01-08 NOTE — ASSESSMENT & PLAN NOTE
Wt Readings from Last 3 Encounters:   01/07/21 63 5 kg (140 lb)   01/05/21 63 1 kg (139 lb 3 2 oz)   12/23/20 62 4 kg (137 lb 9 1 oz)       Presents with increased shortness of breath, dyspnea on exertion and lower extremity edema  10/1 ECHO: Systolic function was mildly reduced, Ejection fraction was estimated to be 45 %  There was mild diffuse hypokinesis (grade 1 diastolic dysfunction)  Workup:  Lab Results   Component Value Date    NTBNP 1,254 (H) 01/07/2021    NTBNP 958 (H) 12/02/2020     o Takes Lasix 40 mg p o  Daily   Physical exam: Patient is currently volume overloaded  Crackles bilateral lung   CTA: Findings suggestive of mild congestive heart failure and 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

## 2021-01-08 NOTE — QUICK NOTE
I just checked the patient again  Once again she was asleep while on BiPAP and appears to be comfortable  She was more easily arousable, and states she is in no pain  Will continue to follow throughout the day  Harshad texted nusing who inquired as well      Freya Lai MD, PGY -1

## 2021-01-08 NOTE — CONSULTS
Consultation - Cardiology   Arthur Villa 79 y o  female MRN: [id number]  Unit/Bed#: S -01 Encounter: [id number]    Assessment/Plan     Principal Problem:    Acute combined systolic and diastolic congestive heart failure (HCC)  Active Problems:    CAD (coronary artery disease)    Hyperlipidemia    Chest pain    Pulmonary edema cardiac cause (HCC)    Elevated troponin      Assessment/Plan    1  Acute on chronic diastolic heart failure with pulmonary edema  CT of the chest reveals pulmonary interstitial edema at lung bases  Subtle ground-glass opacification lung bases throughout the lungs and a bronchovascular distribution suggest pulmonary alveolar edema  There is no PE  Diuretic- has been given a dose of 40 mg of IV Lasix and ordered 40 b i d  Doucmented 450 out  Increase to 40mg TID, dose now  Currently on bipap  Painfree  2  CAD prior CABG 2011- with closure of LIMA to LAD status post revascularization of the LAD and diagonal branch with PCI/drug-eluting stent 2011  Subsequent left heart catheterization December 2011 recurrent disease-prox LAD discrete 70% stenosis amenable to PCI, distal 100% stenosis  First diagonal 50% stenosis proximal to graft anastomosis, 2nd obtuse marginal 100% stenosis, prox RCA 60% stenosis, distal % stenosis  Graft to distal % stenosis  Graft to 2nd obtuse marginal had% stenosis    Pharmacological Myoview stress test September/2015 small apical infarct with no evidence ischemia  Recently hospitalized with elevated troponin and chest pain concerning for NSTEMI 1/ oulmonary edema  Echo LVEF 45% ( previously 55) mild diffuse hypokinesis  Discussion with Interventional Cardiology, medical management advised    On discharge her nitrate, beta blocker, Ranexa, norvasc  have all been uptitrated  DAPT- Aspirin 81/Effient, atorvastatin 20 mg, nitrate- Imdur 90 mg, BB-Toprol 50 mg, Ranexa 1000 b i d     3  Elevated troponin - possible small NSTEMI vs type 2 MI d/t pulmonary edema in setting of known severe CAD  EKG-sinus tachycardia/LBBB ( old)  Troponin 0 02, 1 05, 2 31, 2 36  Will continue medical management of CAD for now and diuresis  Reassess symptoms  Will review with attending  Continue IV heparin for now  3  ICMP-LVEF 45% (prior EF 55%)  Lasix 40 IV b i d - increasing to TID   BB/ would recommend resuming ARB- previously on losartan 50mg daily  Diuretic home dose lasix 40mg PO daily    4  HTN- fairly well controlled with few isolated elevated BP's on current medical management  Continue to monitor with diuresis  Amlodipine 10 mg, Toprol 50, Imdur 90  Initiate ACE inhibitor  after diuresis    5  HLD - atorvastatin 20mg   Cholesterol 168/triglyceride 88/HDL 62/LDL 88    History of Present Illness   Physician Requesting Consult: Savanna Frias DO  Reason for Consult / Principal Problem: cp, chf    HPI: Garfield Land is a 79y o  year old female with CAD with CABGX4 2011 with closure of the LIMA to LAD revascularization of the LAD and diagonal branch with PCI /LAMINE 2011, HTN, HLD who presents with chest pressure and shortness breath  Meds be hypoxic  Pulse ox 84% on 5 L and switch to 15 L non-rebreather  Currently on BiPAP  Has been taking nitroglycerin daily for chest pain  CT of the chest reveals mild heart failure, no PE  Difficult to communicate with due to cognitive dysfunction, poor memory and is currently on bipap  Troponin 0 02, was 1 05, 2 31, 2 36  ProBNP 1254  ( 958 during recent hospitalization)    She was at [facility]  12/2 -12/5/2020  for chest pain with suspected type 1 non-STEMI associated with flash pulmonary edema She was medically managed after discussion with Interventional Cardiology  IV heparin for 48 hours, discharged on increased Norvasc, Toprol, ranexa  and Imdur for discharge  2D echo revealed LVEF 45% with mild diffuse hypokinesis, mild LVH and grade 1 diastolic dysfunction  Mild-to-moderate MR   Mild TR with moderate pulmonary hypertension  Peak troponin 0 63  Inpatient consult to Cardiology  Consult performed by: KATHY Arellano  Consult ordered by: Ariel Grey MD          Review of Systems   Constitutional: Positive for activity change  HENT: Negative  Eyes: Negative  Respiratory: Positive for shortness of breath  Cardiovascular: Positive for chest pain and leg swelling  Negative for palpitations  Gastrointestinal: Negative  Endocrine: Negative  Genitourinary: Negative  Musculoskeletal: Positive for gait problem  Skin: Negative  Allergic/Immunologic: Negative  Neurological: Positive for weakness  Hematological: Negative  Psychiatric/Behavioral: Positive for confusion  All other systems reviewed and are negative  Historical Information   Past Medical History:   Diagnosis Date    Anal fissure     Cardiac disorder     Esophageal reflux     Esophagitis, reflux     Hemorrhoids     Hepatic hemangioma     Last Assessed: 1/13/2015    Herpes zoster     History of colonic polyps     Hypertension     [facility]schem[facility] colitis [facility] [address] [facility]     Lumbar herniated disc     Malignant neoplasm without specification of site ([facility] [address] [facility]     Nephrolithiasis     L   Lithotripsy    Nontoxic single thyroid nodule     Last Assessed: 1/13/2015    Osteoarthritis     Overactive bladder     Raynaud disease     Respiratory system disease     Sjogren's disease ([facility] [address] [facility]     Spinal stenosis     PONCHO (stress urinary incontinence, female)     Uterovaginal prolapse     Grade I-II     Past Surgical History:   Procedure Laterality Date    APPENDECTOMY  1947    CARDIAC SURGERY      CABG    CATARACT EXTRACTION Bilateral     COLONOSCOPY  2012    Fiberoptic    COLONOSCOPY      Resolved: 2006 - 2012 5 year f/u    CORONARY ANGIOPLASTY WITH STENT PLACEMENT      CORONARY ARTERY BYPASS GRAFT      Resolved: 2012    ESOPHAGOGASTRODUODENOSCOPY  2012    Diagnostic    HEMORROIDECTOMY      KNEE SURGERY  LITHOTRIPSY      Renal    MALIGNANT SKIN LESION EXCISION      Face; Resolved: 2004    NV ESOPHAGOGASTRODUODENOSCOPY TRANSORAL DIAGNOSTIC N/A 4/13/2016    Procedure: EGD AND COLONOSCOPY;  Surgeon: Bernardino Vitale MD;  [facility]: [facility];   Service: Gastroenterology    RENAL ARTERY STENT      SKIN LESION EXCISION      Scalp    SOFT TISSUE TUMOR RESECTION      Shoulder; Resolved: 1995    THROMBOLYSIS      Postoperative Thrombolysis PTCA    TONSILLECTOMY      Resolved: 1944     Social History     Substance and Sexual Activity   Alcohol Use Yes    Frequency: Monthly or less    Comment: social     Social History     Substance and Sexual Activity   Drug Use No     Social History     Tobacco Use   Smoking Status Never Smoker   Smokeless Tobacco Never Used     Family History:   Family History   Problem Relation Age of Onset    Heart disease Mother     Hypertension Mother     Osteoporosis Mother     Heart disease Father     Hypertension Father     Ulcerative colitis Family     Colon polyps Family        Meds/Allergies   current meds:   Current Facility-Administered Medications   Medication Dose Route Frequency    acetaminophen (TYLENOL) tablet 650 mg  650 mg Oral Q4H PRN    amLODIPine (NORVASC) tablet 10 mg  10 mg Oral Daily    [START ON 1/9/2021] aspirin (ECOTRIN LOW STRENGTH) EC tablet 81 mg  81 mg Oral Daily    atorvastatin (LIPITOR) tablet 20 mg  20 mg Oral Daily With Dinner    Diclofenac Sodium (VOLTAREN) 1 % topical gel 2 g  2 g Topical 4x Daily    docusate sodium (COLACE) capsule 100 mg  100 mg Oral BID    fentanyl citrate (PF) 100 MCG/2ML 50 mcg  50 mcg Intravenous Once    furosemide (LASIX) injection 40 mg  40 mg Intravenous BID    heparin (porcine) 25,000 units in 0 45% NaCl 250 mL infusion (premix)  3-20 Units/kg/hr (Order-Specific) Intravenous Titrated    heparin (porcine) injection 1,950 Units  1,950 Units Intravenous Q1H PRN    heparin (porcine) injection 3,900 Units  3,900 Units Intravenous Q1H PRN    isosorbide mononitrate (IMDUR) 24 hr tablet 90 mg  90 mg Oral Daily    metoprolol succinate (TOPROL-XL) 24 hr tablet 50 mg  50 mg Oral Daily    morphine injection 1 mg  1 mg Intravenous Q4H PRN    nitroglycerin (NITROSTAT) SL tablet 0 4 mg  0 4 mg Sublingual Q5 Min PRN    ondansetron (ZOFRAN) injection 4 mg  4 mg Intravenous Once    ondansetron (ZOFRAN) injection 4 mg  4 mg Intravenous Q6H PRN    oxyCODONE (ROXICODONE) IR tablet 2 5 mg  2 5 mg Oral Q4H PRN    pantoprazole (PROTONIX) EC tablet 40 mg  40 mg Oral Early Morning    polyethylene glycol (MIRALAX) packet 17 g  17 g Oral Daily    prasugrel (EFFIENT) tablet 10 mg  10 mg Oral Every Other Day    ranolazine (RANEXA) 12 hr tablet 1,000 mg  1,000 mg Oral BID    senna (SENOKOT) tablet 8 6 mg  8 6 mg Oral HS    simethicone (MYLICON) chewable tablet 80 mg  80 mg Oral 4x Daily PRN    and PTA meds:    Medications Prior to Admission   Medication    amLODIPine (NORVASC) 10 mg tablet    aspirin (ECOTRIN LOW STRENGTH) 81 mg EC tablet    atorvastatin (LIPITOR) 20 mg tablet    docusate sodium (Colace) 100 mg capsule    furosemide (LASIX) 40 mg tablet    isosorbide mononitrate (IMDUR) 30 mg 24 hr tablet    metoprolol succinate (TOPROL-XL) 50 mg 24 hr tablet    multivitamin (THERAGRAN) TABS    nitroglycerin (NITROSTAT) 0 4 mg SL tablet    pantoprazole (PROTONIX) 40 mg tablet    polyethylene glycol (MIRALAX) 17 g packet    prasugrel (EFFIENT) tablet    ranolazine (RANEXA) 1000 MG SR tablet    senna (SENOKOT) 8 6 mg     Allergies   Allergen Reactions    Sulfa Antibiotics Anaphylaxis    Formaldehyde     Codeine Palpitations       Objective   Vitals: Blood pressure 130/58, pulse 85, temperature 98 8 °F (37 1 °C), temperature source Axillary, resp  rate 19, height 5' (1 524 m), weight 63 5 kg (139 lb 15 9 oz), SpO2 98 %, not currently breastfeeding    Orthostatic Blood Pressures      Most Recent Value   Blood Pressure  130/58 filed at 01/08/2021 0750   Patient Position - Orthostatic VS  Lying filed at 01/08/2021 0750            Intake/Output Summary (Last 24 hours) at 1/8/2021 0950  Last data filed at 1/8/2021 0500  Gross per 24 hour   Intake    Output 450 ml   Net -450 ml       Invasive Devices     Peripheral Intravenous Line            Peripheral IV 01/07/21 Right Antecubital less than 1 day                Physical Exam: /58 (BP Location: Left arm)   Pulse 85   Temp 98 8 °F (37 1 °C) (Axillary)   Resp 19   Ht 5' (1 524 m)   Wt 63 5 kg (139 lb 15 9 oz) Comment: pt on bedrest  SpO2 98%   BMI 27 34 kg/m²   General Appearance:    Alert, cooperative, no distress, appears stated age   Head:    Normocephalic, no scleral icterus   Eyes:    PERRL   Nose:   Nares normal, septum midline, mucosa normal, no drainage    Throat:   Lips, mucosa, and tongue normal   Neck:   Supple, symmetrical, trachea midline     no carotid bruit or JVD   Back:     Symmetric   Lungs:     Clear to auscultation bilaterally, respirations unlabored   Chest Wall:    No tenderness or deformity    Heart:    Regular rate and rhythm, S1 and S2 normal, no murmur, rub   or gallop   Abdomen:     Soft, non-tender, bowel sounds active all four quadrants,     no masses, no organomegaly   Extremities:   Extremities normal, atraumatic, no cyanosis or edema   Pulses:   2+ and symmetric all extremities   Skin:   Skin color, texture, turgor normal, no rashes or lesions   Neurologic:   Alert and oriented to person place and time   No focal deficits       Lab Results:   Recent Results (from the past 72 hour(s))   Comprehensive metabolic panel    Collection Time: 01/07/21  8:05 PM   Result Value Ref Range    Sodium 135 (L) 136 - 145 mmol/L    Potassium 5 2 3 5 - 5 3 mmol/L    Chloride 100 100 - 108 mmol/L    CO2 23 21 - 32 mmol/L    ANION GAP 12 4 - 13 mmol/L    BUN 24 5 - 25 mg/dL    Creatinine 1 12 0 60 - 1 30 mg/dL    Glucose 137 65 - 140 mg/dL    Calcium 9 7 8 3 - 10 1 mg/dL    AST 53 (H) 5 - 45 U/L    ALT 32 12 - 78 U/L    Alkaline Phosphatase 81 46 - 116 U/L    Total Protein 8 9 (H) 6 4 - 8 2 g/dL    Albumin 4 4 3 5 - 5 0 g/dL    Total Bilirubin 0 98 0 20 - 1 00 mg/dL    eGFR 46 ml/min/1 73sq m   Troponin I    Collection Time: 01/07/21  8:05 PM   Result Value Ref Range    Troponin I 0 02 <=0 04 ng/mL   NT-BNP PRO    Collection Time: 01/07/21  8:05 PM   Result Value Ref Range    NT-proBNP 1,254 (H) <450 pg/mL   CBC and differential    Collection Time: 01/07/21  8:14 PM   Result Value Ref Range    WBC 7 78 4 31 - 10 16 Thousand/uL    RBC 4 99 3 81 - 5 12 Million/uL    Hemoglobin 15 0 11 5 - 15 4 g/dL    Hematocrit 47 2 (H) 34 8 - 46 1 %    MCV 95 82 - 98 fL    MCH 30 1 26 8 - 34 3 pg    MCHC 31 8 31 4 - 37 4 g/dL    RDW 13 2 11 6 - 15 1 %    MPV 10 6 8 9 - 12 7 fL    Platelets 215 361 - 532 Thousands/uL    nRBC 0 /100 WBCs    Neutrophils Relative 65 43 - 75 %    Immat GRANS % 0 0 - 2 %    Lymphocytes Relative 26 14 - 44 %    Monocytes Relative 6 4 - 12 %    Eosinophils Relative 2 0 - 6 %    Basophils Relative 1 0 - 1 %    Neutrophils Absolute 5 11 1 85 - 7 62 Thousands/µL    Immature Grans Absolute 0 03 0 00 - 0 20 Thousand/uL    Lymphocytes Absolute 1 99 0 60 - 4 47 Thousands/µL    Monocytes Absolute 0 43 0 17 - 1 22 Thousand/µL    Eosinophils Absolute 0 17 0 00 - 0 61 Thousand/µL    Basophils Absolute 0 05 0 00 - 0 10 Thousands/µL   Protime-INR    Collection Time: 01/07/21  8:14 PM   Result Value Ref Range    Protime 11 9 11 6 - 14 5 seconds    INR 0 87 0 84 - 1 19   APTT    Collection Time: 01/07/21  8:14 PM   Result Value Ref Range    PTT 31 23 - 37 seconds   D-dimer, quantitative    Collection Time: 01/07/21  8:14 PM   Result Value Ref Range    D-Dimer, Quant 1 11 (H) <0 50 ug/ml FEU   COVID19, Influenza A/B, RSV PCR, [facility]    Collection Time: 01/07/21 10:32 PM    Specimen: Nasopharyngeal Swab; Nares   Result Value Ref Range    SARS-CoV-2 Negative Negative    INFLUENZA A PCR Negative Negative    INFLUENZA B PCR Negative Negative    RSV PCR Negative Negative   Troponin I    Collection Time: 01/08/21  1:22 AM   Result Value Ref Range    Troponin I 1 05 (H) <=0 04 ng/mL   CBC and differential    Collection Time: 01/08/21  4:59 AM   Result Value Ref Range    WBC 11 00 (H) 4 31 - 10 16 Thousand/uL    RBC 4 91 3 81 - 5 12 Million/uL    Hemoglobin 14 8 11 5 - 15 4 g/dL    Hematocrit 45 9 34 8 - 46 1 %    MCV 94 82 - 98 fL    MCH 30 1 26 8 - 34 3 pg    MCHC 32 2 31 4 - 37 4 g/dL    RDW 13 4 11 6 - 15 1 %    MPV 10 5 8 9 - 12 7 fL    Platelets 800 848 - 722 Thousands/uL    nRBC 0 /100 WBCs    Neutrophils Relative 78 (H) 43 - 75 %    Immat GRANS % 1 0 - 2 %    Lymphocytes Relative 14 14 - 44 %    Monocytes Relative 7 4 - 12 %    Eosinophils Relative 0 0 - 6 %    Basophils Relative 0 0 - 1 %    Neutrophils Absolute 8 67 (H) 1 85 - 7 62 Thousands/µL    Immature Grans Absolute 0 05 0 00 - 0 20 Thousand/uL    Lymphocytes Absolute 1 51 0 60 - 4 47 Thousands/µL    Monocytes Absolute 0 71 0 17 - 1 22 Thousand/µL    Eosinophils Absolute 0 02 0 00 - 0 61 Thousand/µL    Basophils Absolute 0 04 0 00 - 0 10 Thousands/µL   Troponin I    Collection Time: 01/08/21  5:03 AM   Result Value Ref Range    Troponin I 2 31 (H) <=0 04 ng/mL   Magnesium    Collection Time: 01/08/21  5:03 AM   Result Value Ref Range    Magnesium 2 1 1 6 - 2 6 mg/dL   Phosphorus    Collection Time: 01/08/21  5:03 AM   Result Value Ref Range    Phosphorus 4 3 (H) 2 3 - 4 1 mg/dL   Comprehensive metabolic panel    Collection Time: 01/08/21  5:03 AM   Result Value Ref Range    Sodium 141 136 - 145 mmol/L    Potassium 3 9 3 5 - 5 3 mmol/L    Chloride 104 100 - 108 mmol/L    CO2 23 21 - 32 mmol/L    ANION GAP 14 (H) 4 - 13 mmol/L    BUN 19 5 - 25 mg/dL    Creatinine 1 09 0 60 - 1 30 mg/dL    Glucose 143 (H) 65 - 140 mg/dL    Calcium 9 3 8 3 - 10 1 mg/dL    AST 34 5 - 45 U/L    ALT 32 12 - 78 U/L    Alkaline Phosphatase 77 46 - 116 U/L    Total Protein 7 9 6 4 - 8 2 g/dL    Albumin 4 2 3 5 - 5 0 g/dL    Total Bilirubin 1 08 (H) 0 20 - 1 00 mg/dL    eGFR 48 ml/min/1 73sq m   APTT six (6) hours after Heparin bolus/drip initiation or dosing change    Collection Time: 01/08/21  7:28 AM   Result Value Ref Range    PTT 46 (H) 23 - 37 seconds   Troponin I    Collection Time: 01/08/21  7:30 AM   Result Value Ref Range    Troponin I 2 36 (H) <=0 04 ng/mL         Proximal  LAD:  There  was  a  discrete  70  %  stenosis   This  is  a  likely  culprit  for  the  patient's  clinical  presentation   It  appears  amenable  to  percutaneous  intervention   FFR  0 87  Distal  LAD:  There  was  a  100  %  stenosis   This  lesion  is  a  chronic  total  occlusion  1st  diagonal:  There  was  a  50  %  stenosis  proximal  to  the  graft  anastomosis  2nd  obtuse  marginal:  There  was  a  100  %  stenosis  Proximal  RCA:  There  was  a  discrete  60  %  stenosis  Distal  RCA:  There  was  a  100  %  stenosis  Graft  to  the  distal  LAD:  There  was  a  100  %  stenosis  in  the  proximal  third  of  the  graft   Graft  to  the  2nd  obtuse  marginal:  There  was  a  100  %  stenosis  at  the  proximal  anastomosis  Imaging: I have personally reviewed pertinent reports  EKG:  Sinus tachycardia/LBBB  VTE Prophylaxis: Heparin    Code Status: Level 1 - Full Code  Advance Directive and Living Will:      Power of :    POLST:      Counseling / Coordination of Care  Total floor / unit time spent today 60 minutes  Greater than 50% of total time was spent with the patient and / or family counseling and / or coordination of care

## 2021-01-08 NOTE — ASSESSMENT & PLAN NOTE
Second set of troponin positive    Assessment:  Elevated troponin rule out NSTEMI versus type 2 MI cardiac demand from CHF/pulmonary edema  · See plan above

## 2021-01-08 NOTE — ED NOTES
Patients SpO2 84% on 5L NC   Switched to 15L non-rebreather at this time        Christine, [employer], RN  01/07/21 2129

## 2021-01-08 NOTE — ASSESSMENT & PLAN NOTE
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

## 2021-01-09 ENCOUNTER — TELEPHONE (OUTPATIENT)
Dept: OTHER | Facility: OTHER | Age: 81
End: 2021-01-09

## 2021-01-09 LAB
ALBUMIN SERPL BCP-MCNC: 3.1 G/DL (ref 3.5–5)
ALP SERPL-CCNC: 58 U/L (ref 46–116)
ALT SERPL W P-5'-P-CCNC: 21 U/L (ref 12–78)
ANION GAP SERPL CALCULATED.3IONS-SCNC: 9 MMOL/L (ref 4–13)
APTT PPP: 30 SECONDS (ref 23–37)
APTT PPP: >210 SECONDS (ref 23–37)
AST SERPL W P-5'-P-CCNC: 30 U/L (ref 5–45)
BASOPHILS # BLD AUTO: 0.04 THOUSANDS/ΜL (ref 0–0.1)
BASOPHILS NFR BLD AUTO: 1 % (ref 0–1)
BILIRUB SERPL-MCNC: 1.15 MG/DL (ref 0.2–1)
BUN SERPL-MCNC: 26 MG/DL (ref 5–25)
CALCIUM ALBUM COR SERPL-MCNC: 8.9 MG/DL (ref 8.3–10.1)
CALCIUM SERPL-MCNC: 8.2 MG/DL (ref 8.3–10.1)
CHLORIDE SERPL-SCNC: 101 MMOL/L (ref 100–108)
CO2 SERPL-SCNC: 27 MMOL/L (ref 21–32)
CREAT SERPL-MCNC: 0.98 MG/DL (ref 0.6–1.3)
EOSINOPHIL # BLD AUTO: 0.21 THOUSAND/ΜL (ref 0–0.61)
EOSINOPHIL NFR BLD AUTO: 4 % (ref 0–6)
ERYTHROCYTE [DISTWIDTH] IN BLOOD BY AUTOMATED COUNT: 13.3 % (ref 11.6–15.1)
GFR SERPL CREATININE-BSD FRML MDRD: 54 ML/MIN/1.73SQ M
GLUCOSE SERPL-MCNC: 97 MG/DL (ref 65–140)
HCT VFR BLD AUTO: 40.1 % (ref 34.8–46.1)
HGB BLD-MCNC: 12.2 G/DL (ref 11.5–15.4)
IMM GRANULOCYTES # BLD AUTO: 0.02 THOUSAND/UL (ref 0–0.2)
IMM GRANULOCYTES NFR BLD AUTO: 0 % (ref 0–2)
LYMPHOCYTES # BLD AUTO: 1.76 THOUSANDS/ΜL (ref 0.6–4.47)
LYMPHOCYTES NFR BLD AUTO: 33 % (ref 14–44)
MAGNESIUM SERPL-MCNC: 2.1 MG/DL (ref 1.6–2.6)
MCH RBC QN AUTO: 30.3 PG (ref 26.8–34.3)
MCHC RBC AUTO-ENTMCNC: 30.4 G/DL (ref 31.4–37.4)
MCV RBC AUTO: 100 FL (ref 82–98)
MONOCYTES # BLD AUTO: 0.47 THOUSAND/ΜL (ref 0.17–1.22)
MONOCYTES NFR BLD AUTO: 9 % (ref 4–12)
NEUTROPHILS # BLD AUTO: 2.84 THOUSANDS/ΜL (ref 1.85–7.62)
NEUTS SEG NFR BLD AUTO: 53 % (ref 43–75)
NRBC BLD AUTO-RTO: 0 /100 WBCS
PLATELET # BLD AUTO: 195 THOUSANDS/UL (ref 149–390)
PMV BLD AUTO: 10.4 FL (ref 8.9–12.7)
POTASSIUM SERPL-SCNC: 3.5 MMOL/L (ref 3.5–5.3)
PROT SERPL-MCNC: 6.4 G/DL (ref 6.4–8.2)
RBC # BLD AUTO: 4.02 MILLION/UL (ref 3.81–5.12)
SODIUM SERPL-SCNC: 137 MMOL/L (ref 136–145)
WBC # BLD AUTO: 5.34 THOUSAND/UL (ref 4.31–10.16)

## 2021-01-09 PROCEDURE — 85730 THROMBOPLASTIN TIME PARTIAL: CPT | Performed by: INTERNAL MEDICINE

## 2021-01-09 PROCEDURE — 99232 SBSQ HOSP IP/OBS MODERATE 35: CPT | Performed by: INTERNAL MEDICINE

## 2021-01-09 PROCEDURE — 85025 COMPLETE CBC W/AUTO DIFF WBC: CPT | Performed by: CHIROPRACTOR

## 2021-01-09 PROCEDURE — 80053 COMPREHEN METABOLIC PANEL: CPT | Performed by: CHIROPRACTOR

## 2021-01-09 PROCEDURE — 83735 ASSAY OF MAGNESIUM: CPT | Performed by: CHIROPRACTOR

## 2021-01-09 PROCEDURE — 94760 N-INVAS EAR/PLS OXIMETRY 1: CPT

## 2021-01-09 RX ORDER — FUROSEMIDE 10 MG/ML
40 INJECTION INTRAMUSCULAR; INTRAVENOUS
Status: DISCONTINUED | OUTPATIENT
Start: 2021-01-09 | End: 2021-01-10

## 2021-01-09 RX ORDER — HEPARIN SODIUM 5000 [USP'U]/ML
5000 INJECTION, SOLUTION INTRAVENOUS; SUBCUTANEOUS EVERY 8 HOURS SCHEDULED
Status: DISCONTINUED | OUTPATIENT
Start: 2021-01-09 | End: 2021-01-17

## 2021-01-09 RX ORDER — LOSARTAN POTASSIUM 50 MG/1
50 TABLET ORAL DAILY
Status: DISCONTINUED | OUTPATIENT
Start: 2021-01-09 | End: 2021-01-14

## 2021-01-09 RX ADMIN — METOPROLOL SUCCINATE 50 MG: 50 TABLET, EXTENDED RELEASE ORAL at 09:41

## 2021-01-09 RX ADMIN — POLYETHYLENE GLYCOL 3350 17 G: 17 POWDER, FOR SOLUTION ORAL at 09:41

## 2021-01-09 RX ADMIN — FUROSEMIDE 40 MG: 10 INJECTION, SOLUTION INTRAMUSCULAR; INTRAVENOUS at 17:26

## 2021-01-09 RX ADMIN — RANOLAZINE 1000 MG: 500 TABLET, FILM COATED, EXTENDED RELEASE ORAL at 17:26

## 2021-01-09 RX ADMIN — DOCUSATE SODIUM 100 MG: 100 CAPSULE, LIQUID FILLED ORAL at 17:26

## 2021-01-09 RX ADMIN — ISOSORBIDE MONONITRATE 90 MG: 30 TABLET, EXTENDED RELEASE ORAL at 09:42

## 2021-01-09 RX ADMIN — RANOLAZINE 1000 MG: 500 TABLET, FILM COATED, EXTENDED RELEASE ORAL at 09:42

## 2021-01-09 RX ADMIN — ATORVASTATIN CALCIUM 20 MG: 20 TABLET, FILM COATED ORAL at 17:26

## 2021-01-09 RX ADMIN — ASPIRIN 81 MG: 81 TABLET, COATED ORAL at 09:42

## 2021-01-09 RX ADMIN — PANTOPRAZOLE SODIUM 40 MG: 40 TABLET, DELAYED RELEASE ORAL at 06:13

## 2021-01-09 RX ADMIN — HEPARIN SODIUM 3900 UNITS: 1000 INJECTION INTRAVENOUS; SUBCUTANEOUS at 03:17

## 2021-01-09 RX ADMIN — DOCUSATE SODIUM 100 MG: 100 CAPSULE, LIQUID FILLED ORAL at 09:42

## 2021-01-09 NOTE — PROGRESS NOTES
Cardiology Progress Note - Heather Godinez 79 y o  female MRN: [id number]    Unit/Bed#: S -01 Encounter: [id number]      Assessment:  Principal Problem:    Acute combined systolic and diastolic congestive heart failure (HCC)  Active Problems:    CAD (coronary artery disease)    Hyperlipidemia    Chest pain    Pulmonary edema cardiac cause (HCC)    Elevated troponin      Plan:    1  Acute on chronic diastolic CHF with flash pulmonary edema - EF mildly reduced on her echocardiogram from last hospital admission  Clinically she has improved and she is currently on nasal cannula  Breathing is comfortable and she is less volume overloaded  Weight has improved  We will continue IV Lasix but decrease this down to twice daily  Continue to follow urine output, daily labs and weight      2  Type 2 MI - Secondary to flash pulmonary edema with her history of CAD  As stated above she has had 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 to the LAD  She subsequently had revascularization of her LAD and diagonal branch with PCI, with residual LAD lesion at 70% noted  Cardiac catheterization was discussed with her at her last hospital admission but they attempted medical management 1st   I did speak with Randell Gonzalez about pursuing a cardiac catheterization now, and she wanted to think about it  We will continue beta-blocker, Imdur and Ranexa  Continue dual anti-platelet therapy  DC IV heparin  We will plan on a cardiac catheterization Monday if patient wishes to pursue a more aggressive management  Subjective:   Patient seen and examined  No significant events overnight  Patient clinically improving  Off of BiPAP now on nasal cannula  Out of bed and shortness of breath has improved  Denies any current chest pain  Objective:     Vitals: Blood pressure 124/57, pulse 71, temperature 97 8 °F (36 6 °C), temperature source Oral, resp   rate 18, height 5' (1 524 m), weight 62 kg (136 lb 9 6 oz), SpO2 97 %, not currently breastfeeding , Body mass index is 26 68 kg/m² ,   Orthostatic Blood Pressures      Most Recent Value   Blood Pressure  124/57 filed at 01/09/2021 7850   Patient Position - Orthostatic VS  Lying filed at 01/09/2021 0826            Intake/Output Summary (Last 24 hours) at 1/9/2021 1132  Last data filed at 1/9/2021 0953  Gross per 24 hour   Intake    Output 900 ml   Net -900 ml       No significant arrhythmias seen on telemetry review  Sinus rhythm      Physical Exam:    GEN: Freddie Huggins appears well, alert and oriented x 3, pleasant and cooperative   HEENT: pupils equal, round, and reactive to light; extraocular muscles intact  NECK: supple, no carotid bruits   +JVD   HEART: regular rhythm, normal S1 and S2, soft systolic murmur, no clicks, gallops or rubs   LUNGS:  Diminished at bases bilaterally; no wheezes, rales, or rhonchi   ABDOMEN: normal bowel sounds, soft, no tenderness, no distention  EXTREMITIES: peripheral pulses normal; no clubbing, cyanosis, or significant edema  NEURO: no focal findings   SKIN: normal without suspicious lesions on exposed skin    Medications:      Current Facility-Administered Medications:     acetaminophen (TYLENOL) tablet 650 mg, 650 mg, Oral, Q4H PRN, Layne Billings MD    aspirin (ECOTRIN LOW STRENGTH) EC tablet 81 mg, 81 mg, Oral, Daily, Layne Billings MD, 81 mg at 01/09/21 7341    atorvastatin (LIPITOR) tablet 20 mg, 20 mg, Oral, Daily With Dinner, Layne Billings MD, 20 mg at 01/08/21 1752    Diclofenac Sodium (VOLTAREN) 1 % topical gel 2 g, 2 g, Topical, 4x Daily, Layne Billings MD    docusate sodium (COLACE) capsule 100 mg, 100 mg, Oral, BID, Layne Billings MD, 100 mg at 01/09/21 0942    fentanyl citrate (PF) 100 MCG/2ML 50 mcg, 50 mcg, Intravenous, Once, Be Beth PA-C, Stopped at 01/07/21 2159    furosemide (LASIX) injection 40 mg, 40 mg, Intravenous, TID (diuretic), KATHY Ruano, Stopped at 01/08/21 1753    heparin (porcine) 25,000 units in 0 45% NaCl 250 mL infusion (premix), 3-20 Units/kg/hr (Order-Specific), Intravenous, Titrated, Rina Gray MD, Stopped at 01/09/21 0868    heparin (porcine) injection 1,950 Units, 1,950 Units, Intravenous, Q1H PRN, Rina Gray MD    heparin (porcine) injection 3,900 Units, 3,900 Units, Intravenous, Q1H PRN, Rina Gray MD, 3,900 Units at 01/09/21 0317    isosorbide mononitrate (IMDUR) 24 hr tablet 90 mg, 90 mg, Oral, Daily, Rina Gray MD, 90 mg at 01/09/21 0942    losartan (COZAAR) tablet 50 mg, 50 mg, Oral, Daily, Kasey Shirley PA-C    metoprolol succinate (TOPROL-XL) 24 hr tablet 50 mg, 50 mg, Oral, Daily, Rina Gray MD, 50 mg at 01/09/21 0941    morphine injection 1 mg, 1 mg, Intravenous, Q4H PRN, Rina Gray MD    nitroglycerin (NITROSTAT) SL tablet 0 4 mg, 0 4 mg, Sublingual, Q5 Min PRN, Rina Gray MD, 0 4 mg at 01/08/21 0058    ondansetron (ZOFRAN) injection 4 mg, 4 mg, Intravenous, Once, Albertina Escudero PA-C, Stopped at 01/07/21 2152    ondansetron (ZOFRAN) injection 4 mg, 4 mg, Intravenous, Q6H PRN, Rina Gray MD    oxyCODONE (ROXICODONE) IR tablet 2 5 mg, 2 5 mg, Oral, Q4H PRN, Rina Gray MD    pantoprazole (PROTONIX) EC tablet 40 mg, 40 mg, Oral, Early Morning, Rina Gray MD, 40 mg at 01/09/21 1172    polyethylene glycol (MIRALAX) packet 17 g, 17 g, Oral, Daily, Rina Gray MD, 17 g at 01/09/21 0941    prasugrel (EFFIENT) tablet 10 mg, 10 mg, Oral, Every Other Day, Rina Gray MD, 10 mg at 01/08/21 0951    ranolazine (RANEXA) 12 hr tablet 1,000 mg, 1,000 mg, Oral, BID, Rina Gray MD, 1,000 mg at 01/09/21 6464    senna (SENOKOT) tablet 8 6 mg, 8 6 mg, Oral, HS, Rina Gray MD, 8 6 mg at 01/08/21 2135    simethicone (MYLICON) chewable tablet 80 mg, 80 mg, Oral, 4x Daily PRN, Nico Lemos MD     Labs & Results:    Results from last 7 days   Lab Units 01/08/21  0730 01/08/21  0503 01/08/21  0122   TROPONIN I ng/mL 2 36* 2 31* 1 05*     Results from last 7 days   Lab Units 01/09/21  0448 01/08/21  0459 01/07/21 2014   WBC Thousand/uL 5 34 11 00* 7 78   HEMOGLOBIN g/dL 12 2 14 8 15 0   HEMATOCRIT % 40 1 45 9 47 2*   PLATELETS Thousands/uL 195 297 256         Results from last 7 days   Lab Units 01/09/21  0448 01/08/21  0503 01/07/21 2005   POTASSIUM mmol/L 3 5 3 9 5 2   CHLORIDE mmol/L 101 104 100   CO2 mmol/L 27 23 23   BUN mg/dL 26* 19 24   CREATININE mg/dL 0 98 1 09 1 12   CALCIUM mg/dL 8 2* 9 3 9 7   ALK PHOS U/L 58 77 81   ALT U/L 21 32 32   AST U/L 30 34 53*     Results from last 7 days   Lab Units 01/09/21 0448 01/08/21  2340 01/08/21  1213  01/07/21 2014   INR   --   --   --   --  0 87   PTT seconds >210* 30 139*   < > 31    < > = values in this interval not displayed  Results from last 7 days   Lab Units 01/09/21 0448 01/08/21  0503   MAGNESIUM mg/dL 2 1 2 1         EKG personally reviewed by Sharri Hernandez MD   Sinus tachycardia with left bundle branch block  ECHO (12/2020):  LEFT VENTRICLE:  Systolic function was mildly reduced  Ejection fraction was estimated to be 45 %  There was mild diffuse hypokinesis  Wall thickness was mildly to moderately increased  There was mild concentric hypertrophy  Doppler parameters were consistent with abnormal left ventricular relaxation (grade 1 diastolic dysfunction)      VENTRICULAR SEPTUM:  There was paradoxical motion  These changes are consistent with LBBB      LEFT ATRIUM:  The atrium was mildly dilated      MITRAL VALVE:  There was mild to moderate regurgitation      TRICUSPID VALVE:  There was mild regurgitation  Estimated peak PA pressure was 55 mmHg    The findings suggest moderate pulmonary hypertension      PULMONIC VALVE:  There was trace regurgitation        Counseling / Coordination of Care  Total floor / unit time spent today 25 minutes  Greater than 50% of total time was spent with the patient and / or family counseling and / or coordination of care

## 2021-01-09 NOTE — PROGRESS NOTES
[address] Internal Medicine Progress Note  Patient: Danie Guerrero 79 y o  female   MRN: [id number]  PCP: Gloria Jeffers MD  Unit/Bed#: S -01 Encounter: [id number]  Date Of Visit: 01/09/21    Assessment:    Principal Problem:    Acute combined systolic and diastolic congestive heart failure (HCC)  Active Problems:    CAD (coronary artery disease)    Hyperlipidemia    Chest pain    Pulmonary edema cardiac cause (HCC)    Elevated troponin      Plan:    · Acute Combined Systolic / Diastolic Heart Failure -   · Diuretic - Continue IV lasix but decrease down to BID dosing from TID dosing  · Beta Blocker - Metoprolol  · ACEI / ARB - Losartan  · Monitor I/O and Daily weights  · Oxygen support  · BMP in am   · Cardiology following  · Elevated Troponin / Probable Type 2 MI, rule out ACS in patient with known CAD -   · Anticoagulation - None  · Antiplatelet - aspirin  Also on Effient  · Statin - Atorvastatin  · Beta Blocker - Metoprolol  · Nitrates - Imdur  Also is on Ranexa  · Oxygen - as needed to maintain sat > 92%  · Cardiology recommending cardiac catheterization on Monday, but on my discussion with the akbar and then with her  today, they do not think they will want to proceed with cath on Monday and would prefer to talk with her outpatient cardiologist Dr Lonny Gandhi during appointment on Thursday before deciding on cath  Possibly she would be ok with cath idea if Dr César Morataya is the doc doing caths on Monday  Will discuss with cardiology further tomorrow  Patient and family are aware of risks of leaving without catheterization  · Hyperlipidemia - continue statin therapy  VTE Pharmacologic Prophylaxis:   Pharmacologic: Add Subcutaneous heparin  Mechanical VTE Prophylaxis in Place: Yes    Patient Centered Rounds: I have performed bedside rounds with nursing staff today      Discussions with Specialists or Other Care Team Provider: None    Education and Discussions with Family / Patient:  updated via phone  Time Spent for Care: 30 minutes  More than 50% of total time spent on counseling and coordination of care as described above  Current Length of Stay: 2 day(s)    Current Patient Status: Inpatient   Certification Statement: The patient will continue to require additional inpatient hospital stay due to continued treatment of heart failure as well as decision on cardiac cath  Discharge Plan / Estimated Discharge Date: Possible discharge Monday  Code Status: Level 1 - Full Code      Subjective: The patient does complain that her lunch arrived late today and that she is unhappy with some of the services the hospital such as this  However, she does feel that the nurses doing a good job  The patient was on BiPAP much of the day yesterday but today is down to just 2 L of oxygen  The patient feels that her breathing is doing much better  She denies any chest pain  No dizziness or lightheadedness  No palpitations  No nausea  No upset stomach  I had extensive discussion with the patient as well as her  regarding the recommendation for cardiac catheterization however, even after this discussion patient is unsure that she wants to have this done at all  The  is even more adamantly indicating a desire to talk with the outpatient cardiologist during their follow-up visit on Thursday before deciding on any cardiac catheterizations  Objective:     Vitals:   Temp (24hrs), Av 9 °F (36 6 °C), Min:97 6 °F (36 4 °C), Max:98 3 °F (36 8 °C)    Temp:  [97 6 °F (36 4 °C)-98 3 °F (36 8 °C)] 97 6 °F (36 4 °C)  HR:  [69-76] 76  Resp:  [18] 18  BP: ()/(43-84) 142/84  SpO2:  [96 %-99 %] 99 %  Body mass index is 26 68 kg/m²  Input and Output Summary (last 24 hours):        Intake/Output Summary (Last 24 hours) at 2021 1845  Last data filed at 2021 1701  Gross per 24 hour   Intake 180 ml   Output 675 ml   Net -495 ml       Physical Exam:     Physical Exam  Vitals signs reviewed  Constitutional:       General: She is not in acute distress  HENT:      Nose: No congestion  Neck:      Comments: Minimal JVD  Cardiovascular:      Rate and Rhythm: Normal rate and regular rhythm  Heart sounds: Murmur ( mild) present  Pulmonary:      Effort: Pulmonary effort is normal       Breath sounds: Rales (Very mild towards the very lower bases) present  No wheezing or rhonchi  Abdominal:      General: Bowel sounds are normal  There is no distension  Palpations: Abdomen is soft  Tenderness: There is no abdominal tenderness  Musculoskeletal:         General: No swelling  Skin:     General: Skin is warm and dry  Coloration: Skin is not pale  Neurological:      Mental Status: She is alert  Additional Data:     Labs:    Results from last 7 days   Lab Units 01/09/21  0448   WBC Thousand/uL 5 34   HEMOGLOBIN g/dL 12 2   HEMATOCRIT % 40 1   PLATELETS Thousands/uL 195   NEUTROS PCT % 53   LYMPHS PCT % 33   MONOS PCT % 9   EOS PCT % 4     Results from last 7 days   Lab Units 01/09/21  0448   POTASSIUM mmol/L 3 5   CHLORIDE mmol/L 101   CO2 mmol/L 27   BUN mg/dL 26*   CREATININE mg/dL 0 98   CALCIUM mg/dL 8 2*   ALK PHOS U/L 58   ALT U/L 21   AST U/L 30     Results from last 7 days   Lab Units 01/07/21 2014   INR  0 87       * I Have Reviewed All Lab Data Listed Above  * Additional Pertinent Lab Tests Reviewed:  All Labs Within Last 24 Hours Reviewed    Imaging:    Imaging Reports Reviewed Today Include:  No new imaging studies  Imaging Personally Reviewed by Myself Includes:  None    Recent Cultures (last 7 days):           Last 24 Hours Medication List:   Current Facility-Administered Medications   Medication Dose Route Frequency Provider Last Rate    acetaminophen  650 mg Oral Q4H PRN Catalina Higgins MD      aspirin  81 mg Oral Daily Catalina Higgins MD      atorvastatin  20 mg Oral Daily With Dinner Catalina Higgins MD      Diclofenac Sodium  2 g Topical 4x Daily Leandro Blocker, MD      docusate sodium  100 mg Oral BID Leandro Blocker, MD      fentanyl citrate (PF)  50 mcg Intravenous Once Verito Riddle PA-C      furosemide  40 mg Intravenous BID (diuretic) Renee Cotto MD      isosorbide mononitrate  90 mg Oral Daily Leandro Blocker, MD      losartan  50 mg Oral Daily Kasey Shirley PA-C      metoprolol succinate  50 mg Oral Daily Leandro Blocker, MD      morphine injection  1 mg Intravenous Q4H PRN Leandro Blocker, MD      nitroglycerin  0 4 mg Sublingual Q5 Min PRN Leandro Blocker, MD      ondansetron  4 mg Intravenous Once Verito Riddle PA-C      ondansetron  4 mg Intravenous Q6H PRN Leandro Blocker, MD      oxyCODONE  2 5 mg Oral Q4H PRN Leandro Blocker, MD      pantoprazole  40 mg Oral Early Morning Leandro Blocker, MD      polyethylene glycol  17 g Oral Daily Leandro Blocker, MD      prasugrel  10 mg Oral Every Other Day Leandro Blocker, MD      ranolazine  1,000 mg Oral BID Leandro Blocker, MD      senna  8 6 mg Oral HS Leandro Blocker, MD      simethicone  80 mg Oral 4x Daily PRN Leandro Blocker, MD          Today, Patient Was Seen By: Verónica Gamez DO    ** Please Note: This note has been constructed using a voice recognition system   **

## 2021-01-09 NOTE — TELEPHONE ENCOUNTER
Pt  Is at [facility], and they would like to do a cath on Monday, but she would like to talk to Dr Alfonso He to ease her anxiety about the operation and see if she can have it done at [facility]

## 2021-01-09 NOTE — NURSING NOTE
Called Lab about abnormally long time waiting for result of PTT drawn  Was told analyzer was broken and they were waiting on someone to fix it  WIll continue to check back

## 2021-01-10 LAB
ALBUMIN SERPL BCP-MCNC: 3.4 G/DL (ref 3.5–5)
ANION GAP SERPL CALCULATED.3IONS-SCNC: 10 MMOL/L (ref 4–13)
ANION GAP SERPL CALCULATED.3IONS-SCNC: 8 MMOL/L (ref 4–13)
ATRIAL RATE: 60 BPM
ATRIAL RATE: 62 BPM
ATRIAL RATE: 73 BPM
BASOPHILS # BLD AUTO: 0.02 THOUSANDS/ΜL (ref 0–0.1)
BASOPHILS NFR BLD AUTO: 0 % (ref 0–1)
BUN SERPL-MCNC: 23 MG/DL (ref 5–25)
BUN SERPL-MCNC: 25 MG/DL (ref 5–25)
CALCIUM ALBUM COR SERPL-MCNC: 9.4 MG/DL (ref 8.3–10.1)
CALCIUM SERPL-MCNC: 8.9 MG/DL (ref 8.3–10.1)
CALCIUM SERPL-MCNC: 8.9 MG/DL (ref 8.3–10.1)
CHLORIDE SERPL-SCNC: 100 MMOL/L (ref 100–108)
CHLORIDE SERPL-SCNC: 98 MMOL/L (ref 100–108)
CO2 SERPL-SCNC: 28 MMOL/L (ref 21–32)
CO2 SERPL-SCNC: 29 MMOL/L (ref 21–32)
CREAT SERPL-MCNC: 0.97 MG/DL (ref 0.6–1.3)
CREAT SERPL-MCNC: 0.98 MG/DL (ref 0.6–1.3)
EOSINOPHIL # BLD AUTO: 0.12 THOUSAND/ΜL (ref 0–0.61)
EOSINOPHIL NFR BLD AUTO: 2 % (ref 0–6)
ERYTHROCYTE [DISTWIDTH] IN BLOOD BY AUTOMATED COUNT: 12.9 % (ref 11.6–15.1)
GFR SERPL CREATININE-BSD FRML MDRD: 54 ML/MIN/1.73SQ M
GFR SERPL CREATININE-BSD FRML MDRD: 55 ML/MIN/1.73SQ M
GLUCOSE SERPL-MCNC: 101 MG/DL (ref 65–140)
GLUCOSE SERPL-MCNC: 103 MG/DL (ref 65–140)
HCT VFR BLD AUTO: 35.7 % (ref 34.8–46.1)
HGB BLD-MCNC: 11.6 G/DL (ref 11.5–15.4)
IMM GRANULOCYTES # BLD AUTO: 0.02 THOUSAND/UL (ref 0–0.2)
IMM GRANULOCYTES NFR BLD AUTO: 0 % (ref 0–2)
LYMPHOCYTES # BLD AUTO: 1.39 THOUSANDS/ΜL (ref 0.6–4.47)
LYMPHOCYTES NFR BLD AUTO: 21 % (ref 14–44)
MCH RBC QN AUTO: 30 PG (ref 26.8–34.3)
MCHC RBC AUTO-ENTMCNC: 32.5 G/DL (ref 31.4–37.4)
MCV RBC AUTO: 92 FL (ref 82–98)
MONOCYTES # BLD AUTO: 0.64 THOUSAND/ΜL (ref 0.17–1.22)
MONOCYTES NFR BLD AUTO: 10 % (ref 4–12)
NEUTROPHILS # BLD AUTO: 4.32 THOUSANDS/ΜL (ref 1.85–7.62)
NEUTS SEG NFR BLD AUTO: 67 % (ref 43–75)
NRBC BLD AUTO-RTO: 0 /100 WBCS
P AXIS: 61 DEGREES
P AXIS: 66 DEGREES
P AXIS: 69 DEGREES
PHOSPHATE SERPL-MCNC: 3.9 MG/DL (ref 2.3–4.1)
PLATELET # BLD AUTO: 206 THOUSANDS/UL (ref 149–390)
PMV BLD AUTO: 10.4 FL (ref 8.9–12.7)
POTASSIUM SERPL-SCNC: 3.4 MMOL/L (ref 3.5–5.3)
POTASSIUM SERPL-SCNC: 3.4 MMOL/L (ref 3.5–5.3)
PR INTERVAL: 148 MS
PR INTERVAL: 226 MS
PR INTERVAL: 226 MS
QRS AXIS: -2 DEGREES
QRS AXIS: 0 DEGREES
QRS AXIS: 3 DEGREES
QRSD INTERVAL: 110 MS
QRSD INTERVAL: 114 MS
QRSD INTERVAL: 140 MS
QT INTERVAL: 428 MS
QT INTERVAL: 436 MS
QT INTERVAL: 456 MS
QTC INTERVAL: 428 MS
QTC INTERVAL: 442 MS
QTC INTERVAL: 502 MS
RBC # BLD AUTO: 3.87 MILLION/UL (ref 3.81–5.12)
SODIUM SERPL-SCNC: 136 MMOL/L (ref 136–145)
SODIUM SERPL-SCNC: 137 MMOL/L (ref 136–145)
T WAVE AXIS: 138 DEGREES
T WAVE AXIS: 67 DEGREES
T WAVE AXIS: 72 DEGREES
TROPONIN I SERPL-MCNC: 0.57 NG/ML
VENTRICULAR RATE: 60 BPM
VENTRICULAR RATE: 62 BPM
VENTRICULAR RATE: 73 BPM
WBC # BLD AUTO: 6.51 THOUSAND/UL (ref 4.31–10.16)

## 2021-01-10 PROCEDURE — 99232 SBSQ HOSP IP/OBS MODERATE 35: CPT | Performed by: INTERNAL MEDICINE

## 2021-01-10 PROCEDURE — 85025 COMPLETE CBC W/AUTO DIFF WBC: CPT | Performed by: NURSE PRACTITIONER

## 2021-01-10 PROCEDURE — 80048 BASIC METABOLIC PNL TOTAL CA: CPT | Performed by: NURSE PRACTITIONER

## 2021-01-10 PROCEDURE — 84484 ASSAY OF TROPONIN QUANT: CPT | Performed by: NURSE PRACTITIONER

## 2021-01-10 PROCEDURE — 80069 RENAL FUNCTION PANEL: CPT | Performed by: PHYSICIAN ASSISTANT

## 2021-01-10 PROCEDURE — 93010 ELECTROCARDIOGRAM REPORT: CPT | Performed by: INTERNAL MEDICINE

## 2021-01-10 PROCEDURE — 93005 ELECTROCARDIOGRAM TRACING: CPT

## 2021-01-10 RX ORDER — ASPIRIN 81 MG/1
81 TABLET ORAL DAILY
Status: DISCONTINUED | OUTPATIENT
Start: 2021-01-12 | End: 2021-01-17 | Stop reason: HOSPADM

## 2021-01-10 RX ORDER — POTASSIUM CHLORIDE 20 MEQ/1
40 TABLET, EXTENDED RELEASE ORAL ONCE
Status: COMPLETED | OUTPATIENT
Start: 2021-01-10 | End: 2021-01-10

## 2021-01-10 RX ORDER — ASPIRIN 81 MG/1
324 TABLET, CHEWABLE ORAL ONCE
Status: COMPLETED | OUTPATIENT
Start: 2021-01-10 | End: 2021-01-11

## 2021-01-10 RX ADMIN — PANTOPRAZOLE SODIUM 40 MG: 40 TABLET, DELAYED RELEASE ORAL at 06:05

## 2021-01-10 RX ADMIN — DICLOFENAC SODIUM 2 G: 10 GEL TOPICAL at 08:52

## 2021-01-10 RX ADMIN — DOCUSATE SODIUM 100 MG: 100 CAPSULE, LIQUID FILLED ORAL at 17:28

## 2021-01-10 RX ADMIN — METOPROLOL SUCCINATE 50 MG: 50 TABLET, EXTENDED RELEASE ORAL at 08:48

## 2021-01-10 RX ADMIN — HEPARIN SODIUM 5000 UNITS: 5000 INJECTION INTRAVENOUS; SUBCUTANEOUS at 21:18

## 2021-01-10 RX ADMIN — DICLOFENAC SODIUM 2 G: 10 GEL TOPICAL at 12:42

## 2021-01-10 RX ADMIN — HEPARIN SODIUM 5000 UNITS: 5000 INJECTION INTRAVENOUS; SUBCUTANEOUS at 13:53

## 2021-01-10 RX ADMIN — HEPARIN SODIUM 5000 UNITS: 5000 INJECTION INTRAVENOUS; SUBCUTANEOUS at 06:06

## 2021-01-10 RX ADMIN — DOCUSATE SODIUM 100 MG: 100 CAPSULE, LIQUID FILLED ORAL at 08:50

## 2021-01-10 RX ADMIN — NITROGLYCERIN 0.4 MG: 0.4 TABLET SUBLINGUAL at 06:15

## 2021-01-10 RX ADMIN — DICLOFENAC SODIUM 2 G: 10 GEL TOPICAL at 17:28

## 2021-01-10 RX ADMIN — OXYCODONE HYDROCHLORIDE 2.5 MG: 5 TABLET ORAL at 07:12

## 2021-01-10 RX ADMIN — PRASUGREL 10 MG: 10 TABLET, FILM COATED ORAL at 08:48

## 2021-01-10 RX ADMIN — SENNOSIDES 8.6 MG: 8.6 TABLET ORAL at 21:18

## 2021-01-10 RX ADMIN — LOSARTAN POTASSIUM 50 MG: 50 TABLET, FILM COATED ORAL at 08:47

## 2021-01-10 RX ADMIN — ATORVASTATIN CALCIUM 20 MG: 20 TABLET, FILM COATED ORAL at 17:26

## 2021-01-10 RX ADMIN — POTASSIUM CHLORIDE 40 MEQ: 1500 TABLET, EXTENDED RELEASE ORAL at 17:26

## 2021-01-10 RX ADMIN — ISOSORBIDE MONONITRATE 90 MG: 30 TABLET, EXTENDED RELEASE ORAL at 08:48

## 2021-01-10 RX ADMIN — ONDANSETRON 4 MG: 2 INJECTION INTRAMUSCULAR; INTRAVENOUS at 13:55

## 2021-01-10 RX ADMIN — FUROSEMIDE 40 MG: 10 INJECTION, SOLUTION INTRAMUSCULAR; INTRAVENOUS at 08:43

## 2021-01-10 RX ADMIN — ASPIRIN 81 MG: 81 TABLET, COATED ORAL at 08:48

## 2021-01-10 RX ADMIN — RANOLAZINE 1000 MG: 500 TABLET, FILM COATED, EXTENDED RELEASE ORAL at 17:28

## 2021-01-10 RX ADMIN — SIMETHICONE 80 MG: 80 TABLET, CHEWABLE ORAL at 19:28

## 2021-01-10 RX ADMIN — RANOLAZINE 1000 MG: 500 TABLET, FILM COATED, EXTENDED RELEASE ORAL at 08:48

## 2021-01-10 RX ADMIN — NITROGLYCERIN 0.4 MG: 0.4 TABLET SUBLINGUAL at 06:21

## 2021-01-10 NOTE — ASSESSMENT & PLAN NOTE
Wt Readings from Last 3 Encounters:   01/09/21 62 kg (136 lb 9 6 oz)   01/05/21 63 1 kg (139 lb 3 2 oz)   12/23/20 62 4 kg (137 lb 9 1 oz)   · Patient was diuresed    · Cardiology following

## 2021-01-10 NOTE — PROGRESS NOTES
Progress Note - Garfield Land 1940, 79 y o  female MRN: [id number]    Unit/Bed#: S -01 Encounter: [id number]    Primary Care Provider: Emigdio Holbrook MD   Date and time admitted to hospital: 1/7/2021  7:41 PM        Elevated troponin  Assessment & Plan  Troponin trending <  57  Today 01/10    Assessment:  Elevated troponin rule out NSTEMI versus type 2 MI cardiac demand from CHF/pulmonary edema  · See plan above    Pulmonary edema cardiac cause [facility])  Assessment & Plan  Wt Readings from Last 3 Encounters:   01/09/21 62 kg (136 lb 9 6 oz)   01/05/21 63 1 kg (139 lb 3 2 oz)   12/23/20 62 4 kg (137 lb 9 1 oz)   · Patient was diuresed    · Cardiology following        Chest pain  Assessment & Plan    Initial chest pain located in the right radiating to both arms  Pain was described as squeezing, tearing and rated 10/10  Alleviating factors: Nitroglycerin and rest  Worsened by: deep inspiration, emotional stress and walking    CLARE score of 4  Workup:   CXR:  No official results looks congestion   CT of the chest showed mild congestion    Recent Labs     01/08/21  0503 01/08/21  0730 01/10/21  0438   TROPONINI 2 31* 2 36* 0 57*     Assessment:  Chest pain with elevated troponin rule out ACS is versus type 2 MI due to cardiac demand of acute CHF/pulmonary edema, due to patient's significant history p m  Previous admission, will treat as ACS     Plan:    STAT EKG and troponin for chest pain   Telemetry to monitor for arrhythmias   EKG sinus tach with left bundle branch block some ST changes, no noticeable ST elevation   Heparin drip ACS protocol   Labs:  o Initial serial tropinin q3h x 3 and repeated 01/11 for severe chest px    Lipid panel was recently done   Meds:  o Nitroglycerin p r n   o ASA 81 mg  o Continue beta-blocker, continue Lipitor  o Will place patient on Tylenol p r n   o Voltaren gel on the chest  o Oxycodone for moderate pain  o Morphine for severe pain  Cardiology consulted & discussing with family possible cardiac cath    CAD (coronary artery disease)  Assessment & Plan  · Continue Lipitor  · Isosorbide mononitrate 90 mg daily  · Metoprolol 24 hour tablet 50 mg daily  · Effient 10 mg every other day, this was confirmed with prescription  · Ranexa 1000 mg b i d     * Acute combined systolic and diastolic congestive heart failure (HCC)  Assessment & Plan  Wt Readings from Last 3 Encounters:   01/09/21 62 kg (136 lb 9 6 oz)   01/05/21 63 1 kg (139 lb 3 2 oz)   12/23/20 62 4 kg (137 lb 9 1 oz)       Presented with increased shortness of breath, dyspnea on exertion and lower extremity edema  37/6 ECHO: Systolic function was mildly reduced, Ejection fraction was estimated to be 45 %  There was mild diffuse hypokinesis (grade 1 diastolic dysfunction)  Cardiology following  Workup:  Lab Results   Component Value Date    NTBNP 1,254 (H) 01/07/2021    NTBNP 958 (H) 12/02/2020     o Takes Lasix 40 mg p o  Daily     CTA: Findings suggestive of mild congestive heart failure and 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            VTE Pharmacologic Prophylaxis:   Pharmacologic: Heparin  Mechanical VTE Prophylaxis in Place: Yes    Discussions with Specialists or Other Care Team Provider: Dr Hendrickson Post    Education and Discussions with Family / Patient: Spouse    Current Length of Stay: 3 day(s)    Current Patient Status: Inpatient     Discharge Plan / Estimated Discharge Date:  Unknown at present    Code Status: Level 1 - Full Code      Subjective: The patient was seen in the room this morning where she was seated in a chair eating breakfast   She was currently in no acute distress, no oxygen currently being administered  She does report being extremely fatigued  Overnight staff did report an early a m  sgnificant bout of worsened chest pain for which the patient voiced concerns this morning  Nursing staff was present while I examined the patient and verified the patient had an EKG and troponin drawn during this episode  The patient denied nausea, vomiting, and significant shortness of breath or chest pain while seated in her chair  Objective:     Vitals:   Temp (24hrs), Av 6 °F (36 4 °C), Min:97 5 °F (36 4 °C), Max:97 8 °F (36 6 °C)    Temp:  [97 5 °F (36 4 °C)-97 8 °F (36 6 °C)] 97 5 °F (36 4 °C)  HR:  [75-84] 76  Resp:  [21] 21  BP: (121-194)/(57-84) 154/70  SpO2:  [90 %-99 %] 90 %  Body mass index is 26 68 kg/m²  Input and Output Summary (last 24 hours): Intake/Output Summary (Last 24 hours) at 1/10/2021 1001  Last data filed at 1/10/2021 0031  Gross per 24 hour   Intake 420 ml   Output 575 ml   Net -155 ml       Physical Exam:     Physical Exam  Constitutional:       General: She is not in acute distress  Eyes:      Extraocular Movements: Extraocular movements intact  Cardiovascular:      Rate and Rhythm: Normal rate and regular rhythm  Heart sounds: No murmur  No friction rub  No gallop  Pulmonary:      Effort: No respiratory distress  Breath sounds: No wheezing or rales  Abdominal:      General: Bowel sounds are normal       Palpations: Abdomen is soft  Tenderness: There is no abdominal tenderness  Musculoskeletal:         General: No swelling  Skin:     General: Skin is warm and dry  Neurological:      General: No focal deficit present        Mental Status: She is alert  Additional Data:     Labs:    Results from last 7 days   Lab Units 01/10/21  0438   WBC Thousand/uL 6 51   HEMOGLOBIN g/dL 11 6   HEMATOCRIT % 35 7   PLATELETS Thousands/uL 206   NEUTROS PCT % 67   LYMPHS PCT % 21   MONOS PCT % 10   EOS PCT % 2     Results from last 7 days   Lab Units 01/10/21  0411 01/09/21  0448   POTASSIUM mmol/L 3 4*  3 4* 3 5   CHLORIDE mmol/L 98*  100 101   CO2 mmol/L 28  29 27   BUN mg/dL 25  23 26*   CREATININE mg/dL 0 97  0 98 0 98   CALCIUM mg/dL 8 9  8 9 8 2*   ALK PHOS U/L  --  58   ALT U/L  --  21   AST U/L  --  30     Results from last 7 days   Lab Units 01/07/21 2014   INR  0 87       * I Have Reviewed All Lab Data Listed Above  * Additional Pertinent Lab Tests Reviewed:  All Labs Within Last 24 Hours Reviewed    Imaging:    Imaging Reports Reviewed Today Include:  CXR performed 0 1/0 7            Last 24 Hours Medication List:   Current Facility-Administered Medications   Medication Dose Route Frequency Provider Last Rate    acetaminophen  650 mg Oral Q4H PRN Francie Bryant MD      aspirin  81 mg Oral Daily Francie Bryant MD      atorvastatin  20 mg Oral Daily With Dinner Francie Bryant MD      Diclofenac Sodium  2 g Topical 4x Daily Francie Bryant MD      docusate sodium  100 mg Oral BID Francie Bryant MD      fentanyl citrate (PF)  50 mcg Intravenous Once Be Beth PA-C      furosemide  40 mg Intravenous BID (diuretic) Kim Lopez MD      heparin (porcine)  5,000 Units Subcutaneous [facility] [address], DO      isosorbide mononitrate  90 mg Oral Daily Francie Bryant MD      losartan  50 mg Oral Daily Kasey Shirley PA-C      metoprolol succinate  50 mg Oral Daily Francie Bryant MD      morphine injection  1 mg Intravenous Q4H PRN Francie Bryant MD      nitroglycerin  0 4 mg Sublingual Q5 Min PRN Francie Bryant MD      ondansetron  4 mg Intravenous Once Sylvester Peterson PA-C      ondansetron  4 mg Intravenous Q6H PRN Sophia Kumar MD      oxyCODONE  2 5 mg Oral Q4H PRN Sophia Kumar MD      pantoprazole  40 mg Oral Early Morning Sophia Kumar MD      polyethylene glycol  17 g Oral Daily Sophia Kumar MD      prasugrel  10 mg Oral Every Other Day Sophia Kumar MD      ranolazine  1,000 mg Oral BID Sophia Kumar MD      senna  8 6 mg Oral HS Sophia Kumar MD      simethicone  80 mg Oral 4x Daily PRN Sophia Kumar MD          Today, Patient Was Seen By: Jamie Douglas MD    ** Please Note: This note has been constructed using a voice recognition system   **

## 2021-01-10 NOTE — PLAN OF CARE
Problem: Nutrition/Hydration-ADULT  Goal: Nutrient/Hydration intake appropriate for improving, restoring or maintaining nutritional needs  Description: Monitor and assess patient's nutrition/hydration status for malnutrition  Collaborate with interdisciplinary team and initiate plan and interventions as ordered  Monitor patient's weight and dietary intake as ordered or per policy  Utilize nutrition screening tool and intervene as necessary  Determine patient's food preferences and provide high-protein, high-caloric foods as appropriate  INTERVENTIONS:  - Monitor oral intake, urinary output, labs, and treatment plans  - Assess nutrition and hydration status and recommend course of action  - Evaluate amount of meals eaten  - Assist patient with eating if necessary   - Allow adequate time for meals  - Recommend/ encourage appropriate diets, oral nutritional supplements, and vitamin/mineral supplements  - Order, calculate, and assess calorie counts as needed  - Recommend, monitor, and adjust tube feedings and TPN/PPN based on assessed needs  - Assess need for intravenous fluids  - Provide specific nutrition/hydration education as appropriate  - Include patient/family/caregiver in decisions related to nutrition  Outcome: Progressing     Problem: Potential for Falls  Goal: Patient will remain free of falls  Description: INTERVENTIONS:  - Assess patient frequently for physical needs  -  Identify cognitive and physical deficits and behaviors that affect risk of falls    -  [place] fall precautions as indicated by assessment   - Educate patient/family on patient safety including physical limitations  - Instruct patient to call for assistance with activity based on assessment  - Modify environment to reduce risk of injury  - Consider OT/PT consult to assist with strengthening/mobility  Outcome: Progressing     Problem: CARDIOVASCULAR - ADULT  Goal: Maintains optimal cardiac output and hemodynamic stability  Description: INTERVENTIONS:  - Monitor I/O, vital signs and rhythm  - Monitor for S/S and trends of decreased cardiac output  - Administer and titrate ordered vasoactive medications to optimize hemodynamic stability  - Assess quality of pulses, skin color and temperature  - Assess for signs of decreased coronary artery perfusion  - Instruct patient to report change in severity of symptoms  Outcome: Progressing  Goal: Absence of cardiac dysrhythmias or at baseline rhythm  Description: INTERVENTIONS:  - Continuous cardiac monitoring, vital signs, obtain 12 lead EKG if ordered  - Administer antiarrhythmic and heart rate control medications as ordered  - Monitor electrolytes and administer replacement therapy as ordered  Outcome: Progressing     Problem: RESPIRATORY - ADULT  Goal: Achieves optimal ventilation and oxygenation  Description: INTERVENTIONS:  - Assess for changes in respiratory status  - Assess for changes in mentation and behavior  - Position to facilitate oxygenation and minimize respiratory effort  - Oxygen administered by appropriate delivery if ordered  - Initiate smoking cessation education as indicated  - Encourage broncho-pulmonary hygiene including cough, deep breathe, Incentive Spirometry  - Assess the need for suctioning and aspirate as needed  - Assess and instruct to report SOB or any respiratory difficulty  - Respiratory Therapy support as indicated  Outcome: Progressing     Problem: SAFETY ADULT  Goal: Patient will remain free of falls  Description: INTERVENTIONS:  - Assess patient frequently for physical needs  -  Identify cognitive and physical deficits and behaviors that affect risk of falls    -  [place] fall precautions as indicated by assessment   - Educate patient/family on patient safety including physical limitations  - Instruct patient to call for assistance with activity based on assessment  - Modify environment to reduce risk of injury  - Consider OT/PT consult to assist with strengthening/mobility  Outcome: Progressing  Goal: Maintain or return to baseline ADL function  Description: INTERVENTIONS:  -  Assess patient's ability to carry out ADLs; assess patient's baseline for ADL function and identify physical deficits which impact ability to perform ADLs (bathing, care of mouth/teeth, toileting, grooming, dressing, etc )  - Assess/evaluate cause of self-care deficits   - Assess range of motion  - Assess patient's mobility; develop plan if impaired  - Assess patient's need for assistive devices and provide as appropriate  - Encourage maximum independence but intervene and supervise when necessary  - Involve family in performance of ADLs  - Assess for home care needs following discharge   - Consider OT consult to assist with ADL evaluation and planning for discharge  - Provide patient education as appropriate  Outcome: Progressing  Goal: Maintain or return mobility status to optimal level  Description: INTERVENTIONS:  - Assess patient's baseline mobility status (ambulation, transfers, stairs, etc )    - Identify cognitive and physical deficits and behaviors that affect mobility  - Identify mobility aids required to assist with transfers and/or ambulation (gait belt, sit-to-stand, lift, walker, cane, etc )  - [place] fall precautions as indicated by assessment  - Record patient progress and toleration of activity level on Mobility SBAR; progress patient to next Phase/Stage  - Instruct patient to call for assistance with activity based on assessment  - Consider rehabilitation consult to assist with strengthening/weightbearing, etc   Outcome: Progressing     Problem: DISCHARGE PLANNING  Goal: Discharge to home or other facility with appropriate resources  Description: INTERVENTIONS:  - Identify barriers to discharge w/patient and caregiver  - Arrange for needed discharge resources and transportation as appropriate  - Identify discharge learning needs (meds, wound care, etc )  - Arrange for interpretive services to assist at discharge as needed  - Refer to Case Management Department for coordinating discharge planning if the patient needs post-hospital services based on physician/advanced practitioner order or complex needs related to functional status, cognitive ability, or social support system  Outcome: Progressing

## 2021-01-10 NOTE — ASSESSMENT & PLAN NOTE
Initial chest pain located in the right radiating to both arms  Pain was described as squeezing, tearing and rated 10/10  Alleviating factors: Nitroglycerin and rest  Worsened by: deep inspiration, emotional stress and walking    CLARE score of 4  Workup:   CXR:  No official results looks congestion   CT of the chest showed mild congestion    Recent Labs     01/08/21  0503 01/08/21  0730 01/10/21  0438   TROPONINI 2 31* 2 36* 0 57*     Assessment:  Chest pain with elevated troponin rule out ACS is versus type 2 MI due to cardiac demand of acute CHF/pulmonary edema, due to patient's significant history p m  Previous admission, will treat as ACS     Plan:    STAT EKG and troponin for chest pain   Telemetry to monitor for arrhythmias   EKG sinus tach with left bundle branch block some ST changes, no noticeable ST elevation   Heparin drip ACS protocol   Labs:  o Initial serial tropinin q3h x 3 and repeated 01/11 for severe chest px    Lipid panel was recently done   Meds:  o Nitroglycerin p r n   o ASA 81 mg  o Continue beta-blocker, continue Lipitor  o Will place patient on Tylenol p r n   o Voltaren gel on the chest  o Oxycodone for moderate pain  o Morphine for severe pain  Cardiology consulted & discussing with family possible cardiac cath

## 2021-01-10 NOTE — ASSESSMENT & PLAN NOTE
Troponin trending <  57  Today 01/10    Assessment:  Elevated troponin rule out NSTEMI versus type 2 MI cardiac demand from CHF/pulmonary edema  · See plan above

## 2021-01-10 NOTE — PROGRESS NOTES
Cardiology Progress Note - Toni Brain 79 y o  female MRN: [id number]    Unit/Bed#: S -01 Encounter: [id number]      Assessment:  Principal Problem:    Acute combined systolic and diastolic congestive heart failure (HCC)  Active Problems:    CAD (coronary artery disease)    Hyperlipidemia    Chest pain    Pulmonary edema cardiac cause (HCC)    Elevated troponin      Plan:    1  Acute on chronic diastolic CHF with flash pulmonary edema - EF mildly reduced on her echocardiogram from last hospital admission  Clinically she has improved and she is now off oxygen  She appears back to her baseline  We will hold any more IV Lasix today since we are pursuing a cardiac catheterization tomorrow, but then start oral diuretic therapy after that  Likely to be discharged on 40 mg of Lasix twice daily      2  Type 2 MI - Secondary to flash pulmonary edema with her history of CAD  As stated above she has had 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 to the LAD  She subsequently had revascularization of her LAD and diagonal branch with PCI, with residual LAD lesion at 70% noted  Cardiac catheterization was discussed with her at her last hospital admission but they attempted medical management 1st   I did recommend a cardiac catheterization, and after talking with her  about it she agrees to pursue this  We will continue beta-blocker, Imdur and Ranexa  Continue dual anti-platelet therapy  We will plan this tomorrow  3  Renal artery stenosis - This could be contributing to or be another cause to her flash pulmonary edema  Her blood pressure has been labile  We can take a look at her renal arteries by catheterization as well if needed, if her coronary anatomy does not show any significant change  Subjective:   Patient seen and examined  No significant events overnight  Jeyson Lew continues to improve  She is now off oxygen appears euvolemic      Objective:     Vitals: Blood pressure 154/70, pulse 76, temperature 97 5 °F (36 4 °C), temperature source Oral, resp  rate 21, height 5' (1 524 m), weight 62 kg (136 lb 9 6 oz), SpO2 90 %, not currently breastfeeding , Body mass index is 26 68 kg/m² ,   Orthostatic Blood Pressures      Most Recent Value   Blood Pressure  154/70 filed at 01/10/2021 0711   Patient Position - Orthostatic VS  Lying filed at 01/10/2021 0711            Intake/Output Summary (Last 24 hours) at 1/10/2021 1118  Last data filed at 1/10/2021 0031  Gross per 24 hour   Intake 240 ml   Output 575 ml   Net -335 ml       No significant arrhythmias seen on telemetry review  Sinus rhythm    Physical Exam:    GEN: Carlota Mcgarry appears well, alert and oriented x 3, pleasant and cooperative   HEENT: pupils equal, round, and reactive to light; extraocular muscles intact  NECK: supple, no carotid bruits    JVP improved  HEART: regular rhythm, normal S1 and S2, soft systolic murmur, no clicks, gallops or rubs   LUNGS:  Diminished bilaterally; no wheezes, rales, or rhonchi   ABDOMEN: normal bowel sounds, soft, no tenderness, no distention  EXTREMITIES: peripheral pulses normal; no clubbing, cyanosis, or significant edema  NEURO: no focal findings   SKIN: normal without suspicious lesions on exposed skin    Medications:      Current Facility-Administered Medications:     acetaminophen (TYLENOL) tablet 650 mg, 650 mg, Oral, Q4H PRN, Sandhya Saleh MD    aspirin (ECOTRIN LOW STRENGTH) EC tablet 81 mg, 81 mg, Oral, Daily, Sandhya Saleh MD, 81 mg at 01/10/21 0848    atorvastatin (LIPITOR) tablet 20 mg, 20 mg, Oral, Daily With Dinner, Sandhya Saleh MD, 20 mg at 01/09/21 1726    Diclofenac Sodium (VOLTAREN) 1 % topical gel 2 g, 2 g, Topical, 4x Daily, Sandhya Saleh MD, 2 g at 01/10/21 8014    docusate sodium (COLACE) capsule 100 mg, 100 mg, Oral, BID, Sandhya Saleh MD, 100 mg at 01/10/21 0850    fentanyl citrate (PF) 100 MCG/2ML 50 mcg, 50 mcg, Intravenous, Once, Geo Chu PA-C, Stopped at 01/07/21 2159    furosemide (LASIX) injection 40 mg, 40 mg, Intravenous, BID (diuretic), Choco Portillo MD, 40 mg at 01/10/21 0843    heparin (porcine) subcutaneous injection 5,000 Units, 5,000 Units, Subcutaneous, Q8H [facility], Jason Moe DO, 5,000 Units at 01/10/21 0606    isosorbide mononitrate (IMDUR) 24 hr tablet 90 mg, 90 mg, Oral, Daily, Meghan Hamilton MD, 90 mg at 01/10/21 0848    losartan (COZAAR) tablet 50 mg, 50 mg, Oral, Daily, Kasey Shirley PA-C, 50 mg at 01/10/21 0847    metoprolol succinate (TOPROL-XL) 24 hr tablet 50 mg, 50 mg, Oral, Daily, Meghan Hamilton MD, 50 mg at 01/10/21 0848    morphine injection 1 mg, 1 mg, Intravenous, Q4H PRN, Meghan Hamilton MD    nitroglycerin (NITROSTAT) SL tablet 0 4 mg, 0 4 mg, Sublingual, Q5 Min PRN, Meghan Hamilton MD, 0 4 mg at 01/10/21 5152    ondansetron [facility]) injection 4 mg, 4 mg, Intravenous, Once, Geo Chu PA-C, Stopped at 01/07/21 2152    ondansetron (ZOFRAN) injection 4 mg, 4 mg, Intravenous, Q6H PRN, Meghan Hamilton MD    oxyCODONE (ROXICODONE) IR tablet 2 5 mg, 2 5 mg, Oral, Q4H PRN, Meghan Hamilton MD, 2 5 mg at 01/10/21 0712    pantoprazole (PROTONIX) EC tablet 40 mg, 40 mg, Oral, Early Morning, Meghan Hamilton MD, 40 mg at 01/10/21 0605    polyethylene glycol (MIRALAX) packet 17 g, 17 g, Oral, Daily, Meghan Hamilton MD, 17 g at 01/09/21 0941    prasugrel (EFFIENT) tablet 10 mg, 10 mg, Oral, Every Other Day, Meghan Hamilton MD, 10 mg at 01/10/21 0848    ranolazine (RANEXA) 12 hr tablet 1,000 mg, 1,000 mg, Oral, BID, Meghan Hamilton MD, 1,000 mg at 01/10/21 0848    senna (SENOKOT) tablet 8 6 mg, 8 6 mg, Oral, HS, Meghan Hamilton MD, 8 6 mg at 01/08/21 2135    simethicone (MYLICON) chewable tablet 80 mg, 80 mg, Oral, 4x Daily PRN, Meghan Hamilton MD     Labs & Results:    Results from last 7 days   Lab Units 01/10/21  0438 01/08/21  0730 01/08/21  0503   TROPONIN I ng/mL 0 57* 2 36* 2 31*     Results from last 7 days   Lab Units 01/10/21  0438 01/09/21 0448 01/08/21  0459   WBC Thousand/uL 6 51 5 34 11 00*   HEMOGLOBIN g/dL 11 6 12 2 14 8   HEMATOCRIT % 35 7 40 1 45 9   PLATELETS Thousands/uL 206 195 297         Results from last 7 days   Lab Units 01/10/21  0411 01/09/21 0448 01/08/21  0503 01/07/21 2005   POTASSIUM mmol/L 3 4*  3 4* 3 5 3 9 5 2   CHLORIDE mmol/L 98*  100 101 104 100   CO2 mmol/L 28  29 27 23 23   BUN mg/dL 25  23 26* 19 24   CREATININE mg/dL 0 97  0 98 0 98 1 09 1 12   CALCIUM mg/dL 8 9  8 9 8 2* 9 3 9 7   ALK PHOS U/L  --  58 77 81   ALT U/L  --  21 32 32   AST U/L  --  30 34 53*     Results from last 7 days   Lab Units 01/09/21 0448 01/08/21  2340 01/08/21  1213  01/07/21 2014   INR   --   --   --   --  0 87   PTT seconds >210* 30 139*   < > 31    < > = values in this interval not displayed  Results from last 7 days   Lab Units 01/09/21 0448 01/08/21  0503   MAGNESIUM mg/dL 2 1 2 1         EKG personally reviewed by Teresa Toledo MD   Sinus tachycardia with left bundle branch block  ECHO (12/2020):  LEFT VENTRICLE:  Systolic function was mildly reduced  Ejection fraction was estimated to be 45 %  There was mild diffuse hypokinesis  Wall thickness was mildly to moderately increased  There was mild concentric hypertrophy  Doppler parameters were consistent with abnormal left ventricular relaxation (grade 1 diastolic dysfunction)      VENTRICULAR SEPTUM:  There was paradoxical motion  These changes are consistent with LBBB      LEFT ATRIUM:  The atrium was mildly dilated      MITRAL VALVE:  There was mild to moderate regurgitation      TRICUSPID VALVE:  There was mild regurgitation  Estimated peak PA pressure was 55 mmHg    The findings suggest moderate pulmonary hypertension      PULMONIC VALVE:  There was trace regurgitation        Counseling / Coordination of Care  Total floor / unit time spent today 25 minutes  Greater than 50% of total time was spent with the patient and / or family counseling and / or coordination of care

## 2021-01-10 NOTE — QUICK NOTE
I spoke to the patient's  Ekta Alvarado this morning in considerable detail  He states he and his wife had a long conversation, and they would both like to proceed with cardiac catheterization here at [facility] rather than moving her and delaying this recommended procedure any longer  He request that we continue to follow-up by phone, as he finds not meeting in person to be extremely difficult at this time      Telford Goodpasture, MD, PGY-1 FM

## 2021-01-10 NOTE — ASSESSMENT & PLAN NOTE
Wt Readings from Last 3 Encounters:   01/09/21 62 kg (136 lb 9 6 oz)   01/05/21 63 1 kg (139 lb 3 2 oz)   12/23/20 62 4 kg (137 lb 9 1 oz)       Presented with increased shortness of breath, dyspnea on exertion and lower extremity edema  67/7 ECHO: Systolic function was mildly reduced, Ejection fraction was estimated to be 45 %  There was mild diffuse hypokinesis (grade 1 diastolic dysfunction)  Cardiology following  Workup:  Lab Results   Component Value Date    NTBNP 1,254 (H) 01/07/2021    NTBNP 958 (H) 12/02/2020     o Takes Lasix 40 mg p o  Daily     CTA: Findings suggestive of mild congestive heart failure and 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

## 2021-01-11 ENCOUNTER — APPOINTMENT (INPATIENT)
Dept: CT IMAGING | Facility: HOSPITAL | Age: 81
DRG: 246 | End: 2021-01-11
Payer: MEDICARE

## 2021-01-11 ENCOUNTER — APPOINTMENT (INPATIENT)
Dept: NON INVASIVE DIAGNOSTICS | Facility: HOSPITAL | Age: 81
DRG: 246 | End: 2021-01-11
Payer: MEDICARE

## 2021-01-11 ENCOUNTER — APPOINTMENT (INPATIENT)
Dept: RADIOLOGY | Facility: HOSPITAL | Age: 81
DRG: 246 | End: 2021-01-11
Payer: MEDICARE

## 2021-01-11 PROBLEM — R06.03 RESPIRATORY DISTRESS: Status: ACTIVE | Noted: 2021-01-11

## 2021-01-11 LAB
ALBUMIN SERPL BCP-MCNC: 3.9 G/DL (ref 3.5–5)
ALP SERPL-CCNC: 92 U/L (ref 46–116)
ALT SERPL W P-5'-P-CCNC: 28 U/L (ref 12–78)
ANION GAP SERPL CALCULATED.3IONS-SCNC: 12 MMOL/L (ref 4–13)
APTT PPP: 21 SECONDS (ref 23–37)
AST SERPL W P-5'-P-CCNC: 65 U/L (ref 5–45)
ATRIAL RATE: 74 BPM
ATRIAL RATE: 92 BPM
BILIRUB SERPL-MCNC: 0.97 MG/DL (ref 0.2–1)
BUN SERPL-MCNC: 32 MG/DL (ref 5–25)
CALCIUM SERPL-MCNC: 9.5 MG/DL (ref 8.3–10.1)
CHLORIDE SERPL-SCNC: 96 MMOL/L (ref 100–108)
CO2 SERPL-SCNC: 24 MMOL/L (ref 21–32)
CREAT SERPL-MCNC: 1.35 MG/DL (ref 0.6–1.3)
ERYTHROCYTE [DISTWIDTH] IN BLOOD BY AUTOMATED COUNT: 13.1 % (ref 11.6–15.1)
GFR SERPL CREATININE-BSD FRML MDRD: 37 ML/MIN/1.73SQ M
GLUCOSE SERPL-MCNC: 195 MG/DL (ref 65–140)
HCT VFR BLD AUTO: 44.9 % (ref 34.8–46.1)
HGB BLD-MCNC: 14.8 G/DL (ref 11.5–15.4)
INR PPP: 0.91 (ref 0.84–1.19)
INR PPP: 0.91 (ref 0.84–1.19)
KCT BLD-ACNC: 299 SEC (ref 89–137)
MAGNESIUM SERPL-MCNC: 2.2 MG/DL (ref 1.6–2.6)
MCH RBC QN AUTO: 30.7 PG (ref 26.8–34.3)
MCHC RBC AUTO-ENTMCNC: 33 G/DL (ref 31.4–37.4)
MCV RBC AUTO: 93 FL (ref 82–98)
P AXIS: 61 DEGREES
P AXIS: 66 DEGREES
PLATELET # BLD AUTO: 319 THOUSANDS/UL (ref 149–390)
PMV BLD AUTO: 10.6 FL (ref 8.9–12.7)
POTASSIUM SERPL-SCNC: 3.4 MMOL/L (ref 3.5–5.3)
PR INTERVAL: 144 MS
PR INTERVAL: 152 MS
PROCALCITONIN SERPL-MCNC: <0.05 NG/ML
PROT SERPL-MCNC: 8 G/DL (ref 6.4–8.2)
PROTHROMBIN TIME: 12.3 SECONDS (ref 11.6–14.5)
PROTHROMBIN TIME: 12.4 SECONDS (ref 11.6–14.5)
QRS AXIS: 14 DEGREES
QRS AXIS: 8 DEGREES
QRSD INTERVAL: 146 MS
QRSD INTERVAL: 148 MS
QT INTERVAL: 406 MS
QT INTERVAL: 454 MS
QTC INTERVAL: 502 MS
QTC INTERVAL: 503 MS
RBC # BLD AUTO: 4.82 MILLION/UL (ref 3.81–5.12)
SODIUM SERPL-SCNC: 132 MMOL/L (ref 136–145)
SPECIMEN SOURCE: ABNORMAL
T WAVE AXIS: 126 DEGREES
T WAVE AXIS: 166 DEGREES
TROPONIN I SERPL-MCNC: 3.29 NG/ML
VENTRICULAR RATE: 74 BPM
VENTRICULAR RATE: 92 BPM
WBC # BLD AUTO: 19.96 THOUSAND/UL (ref 4.31–10.16)

## 2021-01-11 PROCEDURE — 93455 CORONARY ART/GRFT ANGIO S&I: CPT | Performed by: INTERNAL MEDICINE

## 2021-01-11 PROCEDURE — G1004 CDSM NDSC: HCPCS

## 2021-01-11 PROCEDURE — 99152 MOD SED SAME PHYS/QHP 5/>YRS: CPT | Performed by: NURSE PRACTITIONER

## 2021-01-11 PROCEDURE — C1760 CLOSURE DEV, VASC: HCPCS | Performed by: NURSE PRACTITIONER

## 2021-01-11 PROCEDURE — 85610 PROTHROMBIN TIME: CPT | Performed by: NURSE PRACTITIONER

## 2021-01-11 PROCEDURE — 71045 X-RAY EXAM CHEST 1 VIEW: CPT

## 2021-01-11 PROCEDURE — 99152 MOD SED SAME PHYS/QHP 5/>YRS: CPT | Performed by: INTERNAL MEDICINE

## 2021-01-11 PROCEDURE — 93005 ELECTROCARDIOGRAM TRACING: CPT

## 2021-01-11 PROCEDURE — 92978 ENDOLUMINL IVUS OCT C 1ST: CPT | Performed by: NURSE PRACTITIONER

## 2021-01-11 PROCEDURE — 99233 SBSQ HOSP IP/OBS HIGH 50: CPT | Performed by: INTERNAL MEDICINE

## 2021-01-11 PROCEDURE — 93010 ELECTROCARDIOGRAM REPORT: CPT | Performed by: INTERNAL MEDICINE

## 2021-01-11 PROCEDURE — C1769 GUIDE WIRE: HCPCS | Performed by: NURSE PRACTITIONER

## 2021-01-11 PROCEDURE — C1894 INTRO/SHEATH, NON-LASER: HCPCS | Performed by: NURSE PRACTITIONER

## 2021-01-11 PROCEDURE — 85730 THROMBOPLASTIN TIME PARTIAL: CPT | Performed by: NURSE PRACTITIONER

## 2021-01-11 PROCEDURE — 80053 COMPREHEN METABOLIC PANEL: CPT | Performed by: INTERNAL MEDICINE

## 2021-01-11 PROCEDURE — C1725 CATH, TRANSLUMIN NON-LASER: HCPCS | Performed by: NURSE PRACTITIONER

## 2021-01-11 PROCEDURE — 99153 MOD SED SAME PHYS/QHP EA: CPT | Performed by: NURSE PRACTITIONER

## 2021-01-11 PROCEDURE — 92978 ENDOLUMINL IVUS OCT C 1ST: CPT | Performed by: INTERNAL MEDICINE

## 2021-01-11 PROCEDURE — 027135Z DILATION OF CORONARY ARTERY, TWO ARTERIES WITH TWO DRUG-ELUTING INTRALUMINAL DEVICES, PERCUTANEOUS APPROACH: ICD-10-PCS | Performed by: INTERNAL MEDICINE

## 2021-01-11 PROCEDURE — C9600 PERC DRUG-EL COR STENT SING: HCPCS | Performed by: NURSE PRACTITIONER

## 2021-01-11 PROCEDURE — C1887 CATHETER, GUIDING: HCPCS | Performed by: NURSE PRACTITIONER

## 2021-01-11 PROCEDURE — 84484 ASSAY OF TROPONIN QUANT: CPT | Performed by: NURSE PRACTITIONER

## 2021-01-11 PROCEDURE — 83735 ASSAY OF MAGNESIUM: CPT | Performed by: NURSE PRACTITIONER

## 2021-01-11 PROCEDURE — 92928 PRQ TCAT PLMT NTRAC ST 1 LES: CPT | Performed by: INTERNAL MEDICINE

## 2021-01-11 PROCEDURE — 92979 ENDOLUMINL IVUS OCT C EA: CPT | Performed by: INTERNAL MEDICINE

## 2021-01-11 PROCEDURE — 84145 PROCALCITONIN (PCT): CPT | Performed by: FAMILY MEDICINE

## 2021-01-11 PROCEDURE — 93455 CORONARY ART/GRFT ANGIO S&I: CPT | Performed by: NURSE PRACTITIONER

## 2021-01-11 PROCEDURE — B2121ZZ FLUOROSCOPY OF SINGLE CORONARY ARTERY BYPASS GRAFT USING LOW OSMOLAR CONTRAST: ICD-10-PCS | Performed by: INTERNAL MEDICINE

## 2021-01-11 PROCEDURE — 74176 CT ABD & PELVIS W/O CONTRAST: CPT

## 2021-01-11 PROCEDURE — C9113 INJ PANTOPRAZOLE SODIUM, VIA: HCPCS | Performed by: FAMILY MEDICINE

## 2021-01-11 PROCEDURE — 85347 COAGULATION TIME ACTIVATED: CPT

## 2021-01-11 PROCEDURE — B2111ZZ FLUOROSCOPY OF MULTIPLE CORONARY ARTERIES USING LOW OSMOLAR CONTRAST: ICD-10-PCS | Performed by: INTERNAL MEDICINE

## 2021-01-11 PROCEDURE — 85027 COMPLETE CBC AUTOMATED: CPT | Performed by: NURSE PRACTITIONER

## 2021-01-11 PROCEDURE — C1753 CATH, INTRAVAS ULTRASOUND: HCPCS | Performed by: NURSE PRACTITIONER

## 2021-01-11 RX ORDER — LEVALBUTEROL 1.25 MG/.5ML
1.25 SOLUTION, CONCENTRATE RESPIRATORY (INHALATION) EVERY 6 HOURS PRN
Status: DISCONTINUED | OUTPATIENT
Start: 2021-01-11 | End: 2021-01-17 | Stop reason: HOSPADM

## 2021-01-11 RX ORDER — HEPARIN SODIUM 1000 [USP'U]/ML
1800 INJECTION, SOLUTION INTRAVENOUS; SUBCUTANEOUS
Status: DISCONTINUED | OUTPATIENT
Start: 2021-01-11 | End: 2021-01-12

## 2021-01-11 RX ORDER — SODIUM CHLORIDE 9 MG/ML
INJECTION, SOLUTION INTRAVENOUS
Status: COMPLETED | OUTPATIENT
Start: 2021-01-11 | End: 2021-01-11

## 2021-01-11 RX ORDER — FENTANYL CITRATE 50 UG/ML
INJECTION, SOLUTION INTRAMUSCULAR; INTRAVENOUS CODE/TRAUMA/SEDATION MEDICATION
Status: COMPLETED | OUTPATIENT
Start: 2021-01-11 | End: 2021-01-11

## 2021-01-11 RX ORDER — SODIUM CHLORIDE 9 MG/ML
50 INJECTION, SOLUTION INTRAVENOUS CONTINUOUS
Status: DISCONTINUED | OUTPATIENT
Start: 2021-01-11 | End: 2021-01-12

## 2021-01-11 RX ORDER — POTASSIUM CHLORIDE 14.9 MG/ML
20 INJECTION INTRAVENOUS ONCE
Status: COMPLETED | OUTPATIENT
Start: 2021-01-11 | End: 2021-01-11

## 2021-01-11 RX ORDER — FUROSEMIDE 10 MG/ML
40 INJECTION INTRAMUSCULAR; INTRAVENOUS ONCE
Status: COMPLETED | OUTPATIENT
Start: 2021-01-11 | End: 2021-01-11

## 2021-01-11 RX ORDER — FUROSEMIDE 10 MG/ML
INJECTION INTRAMUSCULAR; INTRAVENOUS CODE/TRAUMA/SEDATION MEDICATION
Status: COMPLETED | OUTPATIENT
Start: 2021-01-11 | End: 2021-01-11

## 2021-01-11 RX ORDER — HEPARIN SODIUM 10000 [USP'U]/100ML
3-20 INJECTION, SOLUTION INTRAVENOUS
Status: DISCONTINUED | OUTPATIENT
Start: 2021-01-11 | End: 2021-01-12

## 2021-01-11 RX ORDER — PRASUGREL 10 MG/1
10 TABLET, FILM COATED ORAL DAILY
Status: DISCONTINUED | OUTPATIENT
Start: 2021-01-12 | End: 2021-01-17 | Stop reason: HOSPADM

## 2021-01-11 RX ORDER — MIDAZOLAM HYDROCHLORIDE 2 MG/2ML
INJECTION, SOLUTION INTRAMUSCULAR; INTRAVENOUS CODE/TRAUMA/SEDATION MEDICATION
Status: COMPLETED | OUTPATIENT
Start: 2021-01-11 | End: 2021-01-11

## 2021-01-11 RX ORDER — METOPROLOL TARTRATE 5 MG/5ML
5 INJECTION INTRAVENOUS ONCE
Status: COMPLETED | OUTPATIENT
Start: 2021-01-11 | End: 2021-01-11

## 2021-01-11 RX ORDER — SODIUM CHLORIDE FOR INHALATION 0.9 %
3 VIAL, NEBULIZER (ML) INHALATION EVERY 6 HOURS PRN
Status: DISCONTINUED | OUTPATIENT
Start: 2021-01-11 | End: 2021-01-17 | Stop reason: HOSPADM

## 2021-01-11 RX ORDER — LABETALOL 20 MG/4 ML (5 MG/ML) INTRAVENOUS SYRINGE
CODE/TRAUMA/SEDATION MEDICATION
Status: COMPLETED | OUTPATIENT
Start: 2021-01-11 | End: 2021-01-11

## 2021-01-11 RX ORDER — LEVALBUTEROL 1.25 MG/.5ML
1.25 SOLUTION, CONCENTRATE RESPIRATORY (INHALATION)
Status: DISCONTINUED | OUTPATIENT
Start: 2021-01-11 | End: 2021-01-11

## 2021-01-11 RX ORDER — SODIUM CHLORIDE 9 MG/ML
50 INJECTION, SOLUTION INTRAVENOUS CONTINUOUS
Status: DISCONTINUED | OUTPATIENT
Start: 2021-01-11 | End: 2021-01-11

## 2021-01-11 RX ORDER — HEPARIN SODIUM 1000 [USP'U]/ML
3600 INJECTION, SOLUTION INTRAVENOUS; SUBCUTANEOUS ONCE
Status: COMPLETED | OUTPATIENT
Start: 2021-01-11 | End: 2021-01-11

## 2021-01-11 RX ORDER — MAGNESIUM HYDROXIDE/ALUMINUM HYDROXICE/SIMETHICONE 120; 1200; 1200 MG/30ML; MG/30ML; MG/30ML
30 SUSPENSION ORAL EVERY 4 HOURS PRN
Status: DISCONTINUED | OUTPATIENT
Start: 2021-01-11 | End: 2021-01-17 | Stop reason: HOSPADM

## 2021-01-11 RX ORDER — SODIUM CHLORIDE FOR INHALATION 0.9 %
3 VIAL, NEBULIZER (ML) INHALATION
Status: DISCONTINUED | OUTPATIENT
Start: 2021-01-11 | End: 2021-01-11

## 2021-01-11 RX ORDER — HEPARIN SODIUM 1000 [USP'U]/ML
INJECTION, SOLUTION INTRAVENOUS; SUBCUTANEOUS CODE/TRAUMA/SEDATION MEDICATION
Status: COMPLETED | OUTPATIENT
Start: 2021-01-11 | End: 2021-01-11

## 2021-01-11 RX ORDER — PANTOPRAZOLE SODIUM 40 MG/1
40 INJECTION, POWDER, FOR SOLUTION INTRAVENOUS EVERY 12 HOURS SCHEDULED
Status: DISCONTINUED | OUTPATIENT
Start: 2021-01-11 | End: 2021-01-15

## 2021-01-11 RX ORDER — HEPARIN SODIUM 1000 [USP'U]/ML
3600 INJECTION, SOLUTION INTRAVENOUS; SUBCUTANEOUS
Status: DISCONTINUED | OUTPATIENT
Start: 2021-01-11 | End: 2021-01-12

## 2021-01-11 RX ORDER — LIDOCAINE HYDROCHLORIDE 10 MG/ML
INJECTION, SOLUTION EPIDURAL; INFILTRATION; INTRACAUDAL; PERINEURAL CODE/TRAUMA/SEDATION MEDICATION
Status: COMPLETED | OUTPATIENT
Start: 2021-01-11 | End: 2021-01-11

## 2021-01-11 RX ADMIN — POTASSIUM CHLORIDE 20 MEQ: 14.9 INJECTION, SOLUTION INTRAVENOUS at 13:00

## 2021-01-11 RX ADMIN — SENNOSIDES 8.6 MG: 8.6 TABLET ORAL at 21:25

## 2021-01-11 RX ADMIN — PRASUGREL 10 MG: 10 TABLET, FILM COATED ORAL at 15:06

## 2021-01-11 RX ADMIN — FUROSEMIDE 20 MG: 10 INJECTION, SOLUTION INTRAMUSCULAR; INTRAVENOUS at 15:10

## 2021-01-11 RX ADMIN — MIDAZOLAM HYDROCHLORIDE 0.5 MG: 1 INJECTION, SOLUTION INTRAMUSCULAR; INTRAVENOUS at 14:20

## 2021-01-11 RX ADMIN — PANTOPRAZOLE SODIUM 40 MG: 40 INJECTION, POWDER, FOR SOLUTION INTRAVENOUS at 21:25

## 2021-01-11 RX ADMIN — SODIUM CHLORIDE 50 ML/HR: 0.9 INJECTION, SOLUTION INTRAVENOUS at 14:15

## 2021-01-11 RX ADMIN — ATORVASTATIN CALCIUM 20 MG: 20 TABLET, FILM COATED ORAL at 18:01

## 2021-01-11 RX ADMIN — FUROSEMIDE 40 MG: 10 INJECTION, SOLUTION INTRAMUSCULAR; INTRAVENOUS at 11:37

## 2021-01-11 RX ADMIN — MORPHINE SULFATE 1 MG: 2 INJECTION, SOLUTION INTRAMUSCULAR; INTRAVENOUS at 11:50

## 2021-01-11 RX ADMIN — ASPIRIN 81 MG CHEWABLE TABLET 324 MG: 81 TABLET CHEWABLE at 05:06

## 2021-01-11 RX ADMIN — SODIUM CHLORIDE 50 ML/HR: 0.9 INJECTION, SOLUTION INTRAVENOUS at 15:38

## 2021-01-11 RX ADMIN — HEPARIN SODIUM 3600 UNITS: 1000 INJECTION INTRAVENOUS; SUBCUTANEOUS at 13:12

## 2021-01-11 RX ADMIN — METRONIDAZOLE 500 MG: 500 INJECTION, SOLUTION INTRAVENOUS at 16:50

## 2021-01-11 RX ADMIN — DICLOFENAC SODIUM 2 G: 10 GEL TOPICAL at 08:22

## 2021-01-11 RX ADMIN — CEFTRIAXONE SODIUM 1000 MG: 10 INJECTION, POWDER, FOR SOLUTION INTRAVENOUS at 15:34

## 2021-01-11 RX ADMIN — SIMETHICONE 80 MG: 80 TABLET, CHEWABLE ORAL at 10:13

## 2021-01-11 RX ADMIN — ACETAMINOPHEN 650 MG: 325 TABLET, FILM COATED ORAL at 21:25

## 2021-01-11 RX ADMIN — FENTANYL CITRATE 25 MCG: 50 INJECTION, SOLUTION INTRAMUSCULAR; INTRAVENOUS at 14:20

## 2021-01-11 RX ADMIN — PANTOPRAZOLE SODIUM 40 MG: 40 TABLET, DELAYED RELEASE ORAL at 05:06

## 2021-01-11 RX ADMIN — ONDANSETRON 4 MG: 2 INJECTION INTRAMUSCULAR; INTRAVENOUS at 08:26

## 2021-01-11 RX ADMIN — SODIUM CHLORIDE 50 ML/HR: 0.9 INJECTION, SOLUTION INTRAVENOUS at 11:20

## 2021-01-11 RX ADMIN — HEPARIN SODIUM 12 UNITS/KG/HR: 10000 INJECTION, SOLUTION INTRAVENOUS at 12:19

## 2021-01-11 RX ADMIN — HEPARIN SODIUM 6000 UNITS: 1000 INJECTION INTRAVENOUS; SUBCUTANEOUS at 14:30

## 2021-01-11 RX ADMIN — POTASSIUM CHLORIDE 20 MEQ: 14.9 INJECTION, SOLUTION INTRAVENOUS at 15:34

## 2021-01-11 RX ADMIN — IOHEXOL 140 ML: 350 INJECTION, SOLUTION INTRAVENOUS at 15:02

## 2021-01-11 RX ADMIN — LABETALOL 20 MG/4 ML (5 MG/ML) INTRAVENOUS SYRINGE 20 MG: at 15:00

## 2021-01-11 RX ADMIN — LIDOCAINE HYDROCHLORIDE 10 ML: 10 INJECTION, SOLUTION EPIDURAL; INFILTRATION; INTRACAUDAL at 14:21

## 2021-01-11 RX ADMIN — HEPARIN SODIUM 5000 UNITS: 5000 INJECTION INTRAVENOUS; SUBCUTANEOUS at 21:25

## 2021-01-11 RX ADMIN — METOPROLOL TARTRATE 5 MG: 5 INJECTION INTRAVENOUS at 11:17

## 2021-01-11 RX ADMIN — NITROGLYCERIN 0.4 MG: 0.4 TABLET SUBLINGUAL at 10:04

## 2021-01-11 RX ADMIN — RANOLAZINE 1000 MG: 500 TABLET, FILM COATED, EXTENDED RELEASE ORAL at 18:01

## 2021-01-11 RX ADMIN — DICLOFENAC SODIUM 2 G: 10 GEL TOPICAL at 21:26

## 2021-01-11 NOTE — ASSESSMENT & PLAN NOTE
Wt Readings from Last 3 Encounters:   01/11/21 61 8 kg (136 lb 4 8 oz)   01/05/21 63 1 kg (139 lb 3 2 oz)   12/23/20 62 4 kg (137 lb 9 1 oz)   ·   · Cardiology following   · Crackles appreciated on exam, IV lasix restarted as per cardiology  · Patient on NRB  · CXR ordered  · Xopenex ordered  · Continue to monitor

## 2021-01-11 NOTE — ASSESSMENT & PLAN NOTE
Wt Readings from Last 3 Encounters:   01/11/21 61 8 kg (136 lb 4 8 oz)   01/05/21 63 1 kg (139 lb 3 2 oz)   12/23/20 62 4 kg (137 lb 9 1 oz)     · Cardiology following   · Continue Lasix 40mg PO   · Patient on 6L o2  · CXR revealed RLL infiltrate  · Xopenex ordered  · Continue to monitor

## 2021-01-11 NOTE — ASSESSMENT & PLAN NOTE
Wt Readings from Last 3 Encounters:   01/11/21 61 8 kg (136 lb 4 8 oz)   01/05/21 63 1 kg (139 lb 3 2 oz)   12/23/20 62 4 kg (137 lb 9 1 oz)       Presented with increased shortness of breath, dyspnea on exertion and lower extremity edema  70/1 ECHO: Systolic function was mildly reduced, Ejection fraction was estimated to be 45 %  There was mild diffuse hypokinesis (grade 1 diastolic dysfunction)      Lab Results   Component Value Date    NTBNP 1,254 (H) 01/07/2021    NTBNP 958 (H) 12/02/2020     - lasix restarted  - Continue to monitor urine output  - Daily weights  - Cardiology following

## 2021-01-11 NOTE — ASSESSMENT & PLAN NOTE
Initial chest pain located in the right radiating to both arms  Pain was described as squeezing, tearing and rated 10/10  Alleviating factors: Nitroglycerin and rest  Worsened by: deep inspiration, emotional stress and walking    Recent Labs     01/10/21  0438 01/11/21  1201   TROPONINI 0 57* 3 29*        Plan:   - Cardiac cath completed 1/11/2021  Stent placed left main   Signs of mesenteric ischemia noted  - Continue heparin and lasix as per cardiology

## 2021-01-11 NOTE — ASSESSMENT & PLAN NOTE
Initial chest pain located in the right radiating to both arms    Pain was described as squeezing, tearing and rated 10/10  Alleviating factors: Nitroglycerin and rest  Worsened by: deep inspiration, emotional stress and walking    Recent Labs     01/10/21  0438 01/11/21  1201   TROPONINI 0 57* 3 29*        Plan:   - Awaiting cardiac catheterization  - Cardiology restarted heparin and IV lasix after patient began to complain of chest pains

## 2021-01-11 NOTE — PROGRESS NOTES
Progress Note - Cardiology   Freddie Huggins 79 y o  female MRN: [id number]  Unit/Bed#: S -01 Encounter: [id number]        Principal Problem:    Acute combined systolic and diastolic congestive heart failure (HCC)  Active Problems:    CAD (coronary artery disease)    Hyperlipidemia    Chest pain    Pulmonary edema cardiac cause (HCC)    Elevated troponin           Assessment/Plan     1  Acute on chronic diastolic heart failure with pulmonary edema  CT of the chest reveals pulmonary interstitial edema at lung bases  Subtle ground-glass opacification lung bases throughout the lungs and a bronchovascular distribution suggest pulmonary alveolar edema  There is no PE  Diuretic- has been given a dose of 40 mg of IV Lasix X4  Stopped pre cath        2  CAD prior CABG 2011- with closure of LIMA to LAD status post revascularization of the LAD and diagonal branch with PCI/drug-eluting stent 2011  Subsequent left heart catheterization December 2011 recurrent disease-prox LAD discrete 70% stenosis amenable to PCI, distal 100% stenosis  First diagonal 50% stenosis proximal to graft anastomosis, 2nd obtuse marginal 100% stenosis, prox RCA 60% stenosis, distal % stenosis  Graft to distal % stenosis  Graft to 2nd obtuse marginal had% stenosis     Pharmacological Myoview stress test September/2015 small apical infarct with no evidence ischemia  Recently hospitalized with elevated troponin and chest pain concerning for NSTEMI 1/ pulmonary edema  Echo LVEF 45% ( previously 55) mild diffuse hypokinesis  Discussion with Interventional Cardiology, medical management advised  On discharge her nitrate, beta blocker, Ranexa, norvasc  have all been uptitrated  DAPT- Aspirin 81/Effient,  atorvastatin 20 mg, nitrate- Imdur 90 mg, BB-Toprol 50 mg, Ranexa 1000 b i d      Plan per attending cardiologist over weekend to proceed with [facility] today     3  Elevated troponin - possible small NSTEMI vs type 2 MI d/t pulmonary edema in setting of known severe CAD  EKG-sinus tachycardia/LBBB ( old)  Troponin 0 02, 1 05, 2 31, 2 36  [facility] today     3  ICMP-LVEF 45% (prior EF 55%)  Diuresed over weekend  Diuretics on hold   BB/ ARB   Diuretic home dose lasix 40mg PO daily  We need to resume post cath - lasix 40mg BID       4  HTN- fairly well controlled with few isolated elevated BP's on current medical management  Continue to monitor with diuresis  Amlodipine 10 mg, Toprol 50, Imdur 90  ARB- Cozaar 50mg     5  HLD - atorvastatin 20mg   Cholesterol 168/triglyceride 88/HDL 62/LDL 88    6  Bilateral renal artery stenosis- bilateral SUKH could be contributing to flash pulmonary edema  BP has been labile  May need further w/u      7  Nausea/ epigastric /lower Chest pain/ loose stool  NTG without improvement  zofran/Simethicone / maalox  Follow up   EKG- NSR LBBB  Reports  anxiety regarding cath today  Will check troponin now  Stop stool softener    I spoke with her  Mr Pal Ba- [phone]  He also spoke with Dr Peterson Arroyo over the weekend  He is agreeable to procedure  He said his wife is very anxious about the procedure and he feels she probably needs something for anxiety    Subjective/Objective   Chief Complaint/Subjective   As I  entered the room she was sitting at the side of the bed  Appears uncomfortable  She has had epigastric pain/lower chest pain this morning  She said this is not the pain that she presented with  She feels nervous about having the test today  She feels nauseous          Vitals: /64 (BP Location: Left arm)   Pulse 95   Temp (!) 97 4 °F (36 3 °C) (Oral)   Resp 18   Ht 5' (1 524 m)   Wt 61 8 kg (136 lb 4 8 oz)   SpO2 90%   BMI 26 62 kg/m²     Vitals:    01/09/21 0600 01/11/21 0408   Weight: 62 kg (136 lb 9 6 oz) 61 8 kg (136 lb 4 8 oz)     Orthostatic Blood Pressures      Most Recent Value   Blood Pressure  139/64 filed at 01/11/2021 0732   Patient Position - Orthostatic VS  Lying filed at 01/11/2021 0732            Intake/Output Summary (Last 24 hours) at 1/11/2021 0951  Last data filed at 1/10/2021 2238  Gross per 24 hour   Intake 240 ml   Output 375 ml   Net -135 ml       Invasive Devices     Peripheral Intravenous Line            Peripheral IV 01/10/21 Right;Ventral (anterior) Forearm 1 day                Current Facility-Administered Medications   Medication Dose Route Frequency    acetaminophen (TYLENOL) tablet 650 mg  650 mg Oral Q4H PRN    [START ON 1/12/2021] aspirin (ECOTRIN LOW STRENGTH) EC tablet 81 mg  81 mg Oral Daily    atorvastatin (LIPITOR) tablet 20 mg  20 mg Oral Daily With Dinner    Diclofenac Sodium (VOLTAREN) 1 % topical gel 2 g  2 g Topical 4x Daily    docusate sodium (COLACE) capsule 100 mg  100 mg Oral BID    fentanyl citrate (PF) 100 MCG/2ML 50 mcg  50 mcg Intravenous Once    heparin (porcine) subcutaneous injection 5,000 Units  5,000 Units Subcutaneous Q8H [address]    isosorbide mononitrate (IMDUR) 24 hr tablet 90 mg  90 mg Oral Daily    losartan (COZAAR) tablet 50 mg  50 mg Oral Daily    metoprolol succinate (TOPROL-XL) 24 hr tablet 50 mg  50 mg Oral Daily    morphine injection 1 mg  1 mg Intravenous Q4H PRN    nitroglycerin (NITROSTAT) SL tablet 0 4 mg  0 4 mg Sublingual Q5 Min PRN    ondansetron (ZOFRAN) injection 4 mg  4 mg Intravenous Once    ondansetron (ZOFRAN) injection 4 mg  4 mg Intravenous Q6H PRN    oxyCODONE (ROXICODONE) IR tablet 2 5 mg  2 5 mg Oral Q4H PRN    pantoprazole (PROTONIX) EC tablet 40 mg  40 mg Oral Early Morning    polyethylene glycol (MIRALAX) packet 17 g  17 g Oral Daily    prasugrel (EFFIENT) tablet 10 mg  10 mg Oral Every Other Day    ranolazine (RANEXA) 12 hr tablet 1,000 mg  1,000 mg Oral BID    senna (SENOKOT) tablet 8 6 mg  8 6 mg Oral HS    simethicone (MYLICON) chewable tablet 80 mg  80 mg Oral 4x Daily PRN         Physical Exam: /64 (BP Location: Left arm)   Pulse 95   Temp (!) 97 4 °F (36 3 °C) (Oral)   Resp 18   Ht 5' (1 524 m)   Wt 61 8 kg (136 lb 4 8 oz)   SpO2 90%   BMI 26 62 kg/m²     General Appearance:    Alert, cooperative, no distress, appears stated age   Head:    Normocephalic, no scleral icterus   Eyes:    PERRL   Nose:   Nares normal, septum midline, no drainage    Throat:   Lips, mucosa, and tongue normal   Neck:   Supple, symmetrical, trachea midline,       no JVD       Lungs:     Clear to auscultation bilaterally, respirations unlabored   Chest Wall:    No tenderness or deformity    Heart:    Regular rate and rhythm, S1 and S2 normal, no murmur, rub   or gallop   Abdomen:     Soft, with epigastric tenderness   Extremities:   Extremities normal, atraumatic, no cyanosis or edema       Skin:   Skin warm   Neurologic:   Alert and oriented to person place and time, no focal deficits                 Lab Results:   Recent Results (from the past 72 hour(s))   APTT    Collection Time: 01/08/21 12:13 PM   Result Value Ref Range     (HH) 23 - 37 seconds   APTT    Collection Time: 01/08/21 11:40 PM   Result Value Ref Range    PTT 30 23 - 37 seconds   APTT    Collection Time: 01/09/21  4:48 AM   Result Value Ref Range    PTT >210 (HH) 23 - 37 seconds   Magnesium    Collection Time: 01/09/21  4:48 AM   Result Value Ref Range    Magnesium 2 1 1 6 - 2 6 mg/dL   Comprehensive metabolic panel    Collection Time: 01/09/21  4:48 AM   Result Value Ref Range    Sodium 137 136 - 145 mmol/L    Potassium 3 5 3 5 - 5 3 mmol/L    Chloride 101 100 - 108 mmol/L    CO2 27 21 - 32 mmol/L    ANION GAP 9 4 - 13 mmol/L    BUN 26 (H) 5 - 25 mg/dL    Creatinine 0 98 0 60 - 1 30 mg/dL    Glucose 97 65 - 140 mg/dL    Calcium 8 2 (L) 8 3 - 10 1 mg/dL    Corrected Calcium 8 9 8 3 - 10 1 mg/dL    AST 30 5 - 45 U/L    ALT 21 12 - 78 U/L    Alkaline Phosphatase 58 46 - 116 U/L    Total Protein 6 4 6 4 - 8 2 g/dL    Albumin 3 1 (L) 3 5 - 5 0 g/dL    Total Bilirubin 1 15 (H) 0 20 - 1 00 mg/dL    eGFR 54 ml/min/1 73sq m   CBC and differential Collection Time: 01/09/21  4:48 AM   Result Value Ref Range    WBC 5 34 4 31 - 10 16 Thousand/uL    RBC 4 02 3 81 - 5 12 Million/uL    Hemoglobin 12 2 11 5 - 15 4 g/dL    Hematocrit 40 1 34 8 - 46 1 %     (H) 82 - 98 fL    MCH 30 3 26 8 - 34 3 pg    MCHC 30 4 (L) 31 4 - 37 4 g/dL    RDW 13 3 11 6 - 15 1 %    MPV 10 4 8 9 - 12 7 fL    Platelets 865 520 - 837 Thousands/uL    nRBC 0 /100 WBCs    Neutrophils Relative 53 43 - 75 %    Immat GRANS % 0 0 - 2 %    Lymphocytes Relative 33 14 - 44 %    Monocytes Relative 9 4 - 12 %    Eosinophils Relative 4 0 - 6 %    Basophils Relative 1 0 - 1 %    Neutrophils Absolute 2 84 1 85 - 7 62 Thousands/µL    Immature Grans Absolute 0 02 0 00 - 0 20 Thousand/uL    Lymphocytes Absolute 1 76 0 60 - 4 47 Thousands/µL    Monocytes Absolute 0 47 0 17 - 1 22 Thousand/µL    Eosinophils Absolute 0 21 0 00 - 0 61 Thousand/µL    Basophils Absolute 0 04 0 00 - 0 10 Thousands/µL   ECG 12 lead    Collection Time: 01/10/21  3:41 AM   Result Value Ref Range    Ventricular Rate 73 BPM    Atrial Rate 73 BPM    NE Interval 148 ms    QRSD Interval 140 ms    QT Interval 456 ms    QTC Interval 502 ms    P Axis 66 degrees    QRS Axis 0 degrees    T Wave Axis 138 degrees   Renal function panel    Collection Time: 01/10/21  4:11 AM   Result Value Ref Range    Albumin 3 4 (L) 3 5 - 5 0 g/dL    Calcium 8 9 8 3 - 10 1 mg/dL    Corrected Calcium 9 4 8 3 - 10 1 mg/dL    Phosphorus 3 9 2 3 - 4 1 mg/dL    Glucose 101 65 - 140 mg/dL    BUN 23 5 - 25 mg/dL    Creatinine 0 98 0 60 - 1 30 mg/dL    Sodium 137 136 - 145 mmol/L    Potassium 3 4 (L) 3 5 - 5 3 mmol/L    Chloride 100 100 - 108 mmol/L    CO2 29 21 - 32 mmol/L    ANION GAP 8 4 - 13 mmol/L    eGFR 54 ml/min/1 73sq m   Basic metabolic panel    Collection Time: 01/10/21  4:11 AM   Result Value Ref Range    Sodium 136 136 - 145 mmol/L    Potassium 3 4 (L) 3 5 - 5 3 mmol/L    Chloride 98 (L) 100 - 108 mmol/L    CO2 28 21 - 32 mmol/L    ANION GAP 10 4 - 13 mmol/L    BUN 25 5 - 25 mg/dL    Creatinine 0 97 0 60 - 1 30 mg/dL    Glucose 103 65 - 140 mg/dL    Calcium 8 9 8 3 - 10 1 mg/dL    eGFR 55 ml/min/1 73sq m   Troponin I    Collection Time: 01/10/21  4:38 AM   Result Value Ref Range    Troponin I 0 57 (H) <=0 04 ng/mL   CBC and differential    Collection Time: 01/10/21  4:38 AM   Result Value Ref Range    WBC 6 51 4 31 - 10 16 Thousand/uL    RBC 3 87 3 81 - 5 12 Million/uL    Hemoglobin 11 6 11 5 - 15 4 g/dL    Hematocrit 35 7 34 8 - 46 1 %    MCV 92 82 - 98 fL    MCH 30 0 26 8 - 34 3 pg    MCHC 32 5 31 4 - 37 4 g/dL    RDW 12 9 11 6 - 15 1 %    MPV 10 4 8 9 - 12 7 fL    Platelets 535 133 - 376 Thousands/uL    nRBC 0 /100 WBCs    Neutrophils Relative 67 43 - 75 %    Immat GRANS % 0 0 - 2 %    Lymphocytes Relative 21 14 - 44 %    Monocytes Relative 10 4 - 12 %    Eosinophils Relative 2 0 - 6 %    Basophils Relative 0 0 - 1 %    Neutrophils Absolute 4 32 1 85 - 7 62 Thousands/µL    Immature Grans Absolute 0 02 0 00 - 0 20 Thousand/uL    Lymphocytes Absolute 1 39 0 60 - 4 47 Thousands/µL    Monocytes Absolute 0 64 0 17 - 1 22 Thousand/µL    Eosinophils Absolute 0 12 0 00 - 0 61 Thousand/µL    Basophils Absolute 0 02 0 00 - 0 10 Thousands/µL   ECG 12 lead    Collection Time: 01/10/21  7:22 AM   Result Value Ref Range    Ventricular Rate 62 BPM    Atrial Rate 62 BPM    SC Interval 226 ms    QRSD Interval 114 ms    QT Interval 436 ms    QTC Interval 442 ms    P Axis 61 degrees    QRS Axis -2 degrees    T Wave Axis 72 degrees   ECG 12 lead    Collection Time: 01/10/21  7:23 AM   Result Value Ref Range    Ventricular Rate 60 BPM    Atrial Rate 60 BPM    SC Interval 226 ms    QRSD Interval 110 ms    QT Interval 428 ms    QTC Interval 428 ms    P [employer] 69 degrees    QRS Axis 3 degrees    T Wave Axis 67 degrees   Protime-INR    Collection Time: 01/11/21  4:29 AM   Result Value Ref Range    Protime 12 4 11 6 - 14 5 seconds    INR 0 91 0 84 - 1 19     Imaging: I have personally reviewed pertinent reports  EKG: NSR LBBB    Counseling / Coordination of Care  Total time spent today 30 minutes  Greater than 50% of total time was spent with the patient and / or family counseling and / or coordination of care

## 2021-01-11 NOTE — ASSESSMENT & PLAN NOTE
Latest troponin 3 29 (1/11/2021) after patient stated she had abdominal/chest pain    - Cardiology on board  - Cardiac cath completed, stent placed Left main  - heparin restarted

## 2021-01-11 NOTE — ASSESSMENT & PLAN NOTE
Patient required o2 via NRB  States she had vomited earlier which may have caused an aspiration  WBC count elevated 19,000 today  CXR concerning for RLL pneumonia        - Continue flagyl and rocephin  - Monitor vitals

## 2021-01-11 NOTE — ASSESSMENT & PLAN NOTE
Latest troponin 3 29 (1/11/2021) after patient stated she had abdominal/chest pain    - Cardiology on board  - awaiting cardiac cath  - heparin restarted

## 2021-01-11 NOTE — PROGRESS NOTES
Progress Note - Viji Isbell 1940, 79 y o  female MRN: [id number]    Unit/Bed#: S -01 Encounter: [id number]    Primary Care Provider: Jair Self MD   Date and time admitted to hospital: 1/7/2021  7:41 PM        * Acute combined systolic and diastolic congestive heart failure ([facility] [address] )  Assessment & Plan  Wt Readings from Last 3 Encounters:   01/11/21 61 8 kg (136 lb 4 8 oz)   01/05/21 63 1 kg (139 lb 3 2 oz)   12/23/20 62 4 kg (137 lb 9 1 oz)       Presented with increased shortness of breath, dyspnea on exertion and lower extremity edema  12/4 ECHO: Systolic function was mildly reduced, Ejection fraction was estimated to be 45 %  There was mild diffuse hypokinesis (grade 1 diastolic dysfunction)  Lab Results   Component Value Date    NTBNP 1,254 (H) 01/07/2021    NTBNP 958 (H) 12/02/2020     - lasix restarted  - Continue to monitor urine output  - Daily weights  - Cardiology following        Respiratory distress  Assessment & Plan  Patient required o2 via NRB  States she had vomited earlier which may have caused an aspiration  WBC count elevated 19,000 today  - Start flagyl and rocephin  - check CXR  - Check procal      Elevated troponin  Assessment & Plan  Latest troponin 3 29 (1/11/2021) after patient stated she had abdominal/chest pain    - Cardiology on board  - awaiting cardiac cath  - heparin restarted    Pulmonary edema cardiac cause [facility])  Assessment & Plan  Wt Readings from Last 3 Encounters:   01/11/21 61 8 kg (136 lb 4 8 oz)   01/05/21 63 1 kg (139 lb 3 2 oz)   12/23/20 62 4 kg (137 lb 9 1 oz)     · Cardiology following   · Crackles appreciated on exam, IV lasix restarted as per cardiology  · Patient on NRB  · CXR ordered  · Xopenex ordered  · Continue to monitor    Chest pain  Assessment & Plan    Initial chest pain located in the right radiating to both arms    Pain was described as squeezing, tearing and rated 10/10  Alleviating factors: Nitroglycerin and rest  Worsened by: deep inspiration, emotional stress and walking    Recent Labs     01/10/21  0438 21  1201   TROPONINI 0 57* 3 29*        Plan:   - Awaiting cardiac catheterization  - Cardiology restarted heparin and IV lasix after patient began to complain of chest pains    Hyperlipidemia  Assessment & Plan  Continue Lipitor    CAD (coronary artery disease)  Assessment & Plan  · Continue Lipitor  · Isosorbide mononitrate 90 mg daily  · Metoprolol 24 hour tablet 50 mg daily  · Effient 10 mg every other day, this was confirmed with prescription  · Ranexa 1000 mg b i d         VTE Pharmacologic Prophylaxis:   Pharmacologic: Heparin  Mechanical VTE Prophylaxis in Place: Yes    Discussions with Specialists or Other Care Team Provider: Cardiology    Education and Discussions with Family / Patient: Patient    Current Length of Stay: 4 day(s)    Current Patient Status: Inpatient     Discharge Plan / Estimated Discharge Date: TBD    Code Status: Level 1 - Full Code      Subjective:   Patient was seen in bed in distress  States her abdomen and chest were hurting  She did seem nauseous as well  She was already on NRB oxygen and seemed anxious  Objective:     Vitals:   Temp (24hrs), Av 5 °F (36 4 °C), Min:97 4 °F (36 3 °C), Max:97 7 °F (36 5 °C)    Temp:  [97 4 °F (36 3 °C)-97 7 °F (36 5 °C)] 97 4 °F (36 3 °C)  HR:  [] 108  Resp:  [18] 18  BP: (113-145)/(60-84) 143/84  SpO2:  [90 %-98 %] 90 %  Body mass index is 26 62 kg/m²  Input and Output Summary (last 24 hours): Intake/Output Summary (Last 24 hours) at 2021 1447  Last data filed at 2021 1315  Gross per 24 hour   Intake 340 ml   Output 325 ml   Net 15 ml       Physical Exam:     Physical Exam  Vitals signs and nursing note reviewed  Constitutional:       General: She is in acute distress  Appearance: She is well-developed  She is not diaphoretic  HENT:      Head: Normocephalic and atraumatic        Nose: Nose normal       Mouth/Throat: Pharynx: No oropharyngeal exudate  Eyes:      General: No scleral icterus  Right eye: No discharge  Left eye: No discharge  Conjunctiva/sclera: Conjunctivae normal    Cardiovascular:      Rate and Rhythm: Normal rate and regular rhythm  Heart sounds: Normal heart sounds  No murmur  Pulmonary:      Effort: Respiratory distress present  Breath sounds: Wheezing present  Abdominal:      General: There is no distension  Palpations: Abdomen is soft  Tenderness: There is abdominal tenderness  Musculoskeletal: Normal range of motion  Skin:     General: Skin is warm and dry  Coloration: Skin is not pale  Findings: No erythema  Neurological:      Mental Status: She is alert  Additional Data:     Labs:    Results from last 7 days   Lab Units 01/11/21  1208 01/10/21  0438   WBC Thousand/uL 19 96* 6 51   HEMOGLOBIN g/dL 14 8 11 6   HEMATOCRIT % 44 9 35 7   PLATELETS Thousands/uL 319 206   NEUTROS PCT %  --  67   LYMPHS PCT %  --  21   MONOS PCT %  --  10   EOS PCT %  --  2     Results from last 7 days   Lab Units 01/11/21  1208   POTASSIUM mmol/L 3 4*   CHLORIDE mmol/L 96*   CO2 mmol/L 24   BUN mg/dL 32*   CREATININE mg/dL 1 35*   CALCIUM mg/dL 9 5   ALK PHOS U/L 92   ALT U/L 28   AST U/L 65*     Results from last 7 days   Lab Units 01/11/21  1248   INR  0 91       * I Have Reviewed All Lab Data Listed Above  * Additional Pertinent Lab Tests Reviewed:  [address] Admission Reviewed    Imaging:    Imaging Reports Reviewed Today Include: None  Imaging Personally Reviewed by Myself Includes:  None    Recent Cultures (last 7 days):           Last 24 Hours Medication List:   Current Facility-Administered Medications   Medication Dose Route Frequency Provider Last Rate    acetaminophen  650 mg Oral Q4H PRN Marimar Almaguer MD      aluminum-magnesium hydroxide-simethicone  30 mL Oral Q4H PRN KATHY Garcia      [START ON 1/12/2021] aspirin  81 mg Oral Daily KATHY Monsalve      atorvastatin  20 mg Oral Daily With Michael Narayan MD      [START ON 1/12/2021] cefTRIAXone  1,000 mg Intravenous Q24H Zeb More MD      cefTRIAXone  1,000 mg Intravenous Once Zeb More MD      Diclofenac Sodium  2 g Topical 4x Daily León Gurrola MD      fentanyl citrate (PF)  50 mcg Intravenous Once Cesilia Claudio PA-C      fentanyl citrate (PF)   Intravenous Code/Trauma/Sedation Med Missael Redd MD      heparin (porcine)  3-20 Units/kg/hr (Order-Specific) Intravenous Titrated Donnamae [place], CRNP Stopped (01/11/21 1420)    heparin (porcine)  1,800 Units Intravenous Q1H PRN Donnamae [place], CRNP      heparin (porcine)  3,600 Units Intravenous Q1H PRN Donnamae [place], CRNP      heparin (porcine)    Code/Trauma/Sedation Med Missael Redd MD      heparin (porcine)  5,000 Units Subcutaneous Q8H [address], DO      isosorbide mononitrate  90 mg Oral Daily León Gurrola MD      levalbuterol  1 25 mg Nebulization TID Zeb More MD      lidocaine (PF)    Code/Trauma/Sedation Med Missael Redd MD      losartan  50 mg Oral Daily Kasey Shirley PA-C      metoprolol succinate  50 mg Oral Daily León Gurrola MD      metroNIDAZOLE  500 mg Intravenous Q8H Zeb More MD      midazolam    Code/Trauma/Sedation Med Missael Redd MD      morphine injection  1 mg Intravenous Q4H PRN León Gurrola MD      morphine injection  1 mg Intravenous Once Andrewae [place], CRNP      morphine injection  1 mg Intravenous Once Deon [place], CRNP      nitroglycerin  0 4 mg Sublingual Q5 Min PRN León Gurrola MD      ondansetron  4 mg Intravenous Once Cesilia Claudio PA-C      ondansetron  4 mg Intravenous Q6H PRN León Gurrola MD      oxyCODONE  2 5 mg Oral Q4H PRN León Gurrola MD      pantoprazole  40 mg Intravenous Q12H [facility] Zeus Dave MD      polyethylene glycol  17 g Oral Daily Francie Bryant MD      potassium chloride  20 mEq Intravenous Once KATHY Mcleod 20 mEq (01/11/21 1300)    potassium chloride  20 mEq Intravenous Once KATHY Mcleod      prasugrel  10 mg Oral Every Other Day Francie Bryant MD      ranolazine  1,000 mg Oral BID Francie Bryant MD      senna  8 6 mg Oral HS Francie Bryant MD      simethicone  80 mg Oral 4x Daily PRN Francie Bryant MD      sodium chloride  50 mL/hr Intravenous Continuous KATHY Mcleod Stopped (01/11/21 1130)    sodium chloride  3 mL Nebulization TID Josefina Marroquin DO          Today, Patient Was Seen By: Zeus Dave MD    ** Please Note: This note has been constructed using a voice recognition system   **

## 2021-01-11 NOTE — PLAN OF CARE
Problem: Nutrition/Hydration-ADULT  Goal: Nutrient/Hydration intake appropriate for improving, restoring or maintaining nutritional needs  Description: Monitor and assess patient's nutrition/hydration status for malnutrition  Collaborate with interdisciplinary team and initiate plan and interventions as ordered  Monitor patient's weight and dietary intake as ordered or per policy  Utilize nutrition screening tool and intervene as necessary  Determine patient's food preferences and provide high-protein, high-caloric foods as appropriate  INTERVENTIONS:  - Monitor oral intake, urinary output, labs, and treatment plans  - Assess nutrition and hydration status and recommend course of action  - Evaluate amount of meals eaten  - Assist patient with eating if necessary   - Allow adequate time for meals  - Recommend/ encourage appropriate diets, oral nutritional supplements, and vitamin/mineral supplements  - Order, calculate, and assess calorie counts as needed  - Recommend, monitor, and adjust tube feedings and TPN/PPN based on assessed needs  - Assess need for intravenous fluids  - Provide specific nutrition/hydration education as appropriate  - Include patient/family/caregiver in decisions related to nutrition  Outcome: Progressing     Problem: Potential for Falls  Goal: Patient will remain free of falls  Description: INTERVENTIONS:  - Assess patient frequently for physical needs  -  Identify cognitive and physical deficits and behaviors that affect risk of falls    -  [place] fall precautions as indicated by assessment   - Educate patient/family on patient safety including physical limitations  - Instruct patient to call for assistance with activity based on assessment  - Modify environment to reduce risk of injury  - Consider OT/PT consult to assist with strengthening/mobility  Outcome: Progressing     Problem: CARDIOVASCULAR - ADULT  Goal: Maintains optimal cardiac output and hemodynamic stability  Description: INTERVENTIONS:  - Monitor I/O, vital signs and rhythm  - Monitor for S/S and trends of decreased cardiac output  - Administer and titrate ordered vasoactive medications to optimize hemodynamic stability  - Assess quality of pulses, skin color and temperature  - Assess for signs of decreased coronary artery perfusion  - Instruct patient to report change in severity of symptoms  Outcome: Progressing  Goal: Absence of cardiac dysrhythmias or at baseline rhythm  Description: INTERVENTIONS:  - Continuous cardiac monitoring, vital signs, obtain 12 lead EKG if ordered  - Administer antiarrhythmic and heart rate control medications as ordered  - Monitor electrolytes and administer replacement therapy as ordered  Outcome: Progressing     Problem: RESPIRATORY - ADULT  Goal: Achieves optimal ventilation and oxygenation  Description: INTERVENTIONS:  - Assess for changes in respiratory status  - Assess for changes in mentation and behavior  - Position to facilitate oxygenation and minimize respiratory effort  - Oxygen administered by appropriate delivery if ordered  - Initiate smoking cessation education as indicated  - Encourage broncho-pulmonary hygiene including cough, deep breathe, Incentive Spirometry  - Assess the need for suctioning and aspirate as needed  - Assess and instruct to report SOB or any respiratory difficulty  - Respiratory Therapy support as indicated  Outcome: Progressing     Problem: SAFETY ADULT  Goal: Patient will remain free of falls  Description: INTERVENTIONS:  - Assess patient frequently for physical needs  -  Identify cognitive and physical deficits and behaviors that affect risk of falls    -  [place] fall precautions as indicated by assessment   - Educate patient/family on patient safety including physical limitations  - Instruct patient to call for assistance with activity based on assessment  - Modify environment to reduce risk of injury  - Consider OT/PT consult to assist with strengthening/mobility  Outcome: Progressing  Goal: Maintain or return to baseline ADL function  Description: INTERVENTIONS:  -  Assess patient's ability to carry out ADLs; assess patient's baseline for ADL function and identify physical deficits which impact ability to perform ADLs (bathing, care of mouth/teeth, toileting, grooming, dressing, etc )  - Assess/evaluate cause of self-care deficits   - Assess range of motion  - Assess patient's mobility; develop plan if impaired  - Assess patient's need for assistive devices and provide as appropriate  - Encourage maximum independence but intervene and supervise when necessary  - Involve family in performance of ADLs  - Assess for home care needs following discharge   - Consider OT consult to assist with ADL evaluation and planning for discharge  - Provide patient education as appropriate  Outcome: Progressing  Goal: Maintain or return mobility status to optimal level  Description: INTERVENTIONS:  - Assess patient's baseline mobility status (ambulation, transfers, stairs, etc )    - Identify cognitive and physical deficits and behaviors that affect mobility  - Identify mobility aids required to assist with transfers and/or ambulation (gait belt, sit-to-stand, lift, walker, cane, etc )  - [place] fall precautions as indicated by assessment  - Record patient progress and toleration of activity level on Mobility SBAR; progress patient to next Phase/Stage  - Instruct patient to call for assistance with activity based on assessment  - Consider rehabilitation consult to assist with strengthening/weightbearing, etc   Outcome: Progressing     Problem: DISCHARGE PLANNING  Goal: Discharge to home or other facility with appropriate resources  Description: INTERVENTIONS:  - Identify barriers to discharge w/patient and caregiver  - Arrange for needed discharge resources and transportation as appropriate  - Identify discharge learning needs (meds, wound care, etc )  - Arrange for interpretive services to assist at discharge as needed  - Refer to Case Management Department for coordinating discharge planning if the patient needs post-hospital services based on physician/advanced practitioner order or complex needs related to functional status, cognitive ability, or social support system  Outcome: Progressing     Problem: PAIN - ADULT  Goal: Verbalizes/displays adequate comfort level or baseline comfort level  Description: Interventions:  - Encourage patient to monitor pain and request assistance  - Assess pain using appropriate pain scale  - Administer analgesics based on type and severity of pain and evaluate response  - Implement non-pharmacological measures as appropriate and evaluate response  - Consider cultural and social influences on pain and pain management  - Notify physician/advanced practitioner if interventions unsuccessful or patient reports new pain  Outcome: Progressing

## 2021-01-11 NOTE — ASSESSMENT & PLAN NOTE
Wt Readings from Last 3 Encounters:   01/11/21 61 8 kg (136 lb 4 8 oz)   01/05/21 63 1 kg (139 lb 3 2 oz)   12/23/20 62 4 kg (137 lb 9 1 oz)       Presented with increased shortness of breath, dyspnea on exertion and lower extremity edema  94/3 ECHO: Systolic function was mildly reduced, Ejection fraction was estimated to be 45 %  There was mild diffuse hypokinesis (grade 1 diastolic dysfunction)      Lab Results   Component Value Date    NTBNP 1,254 (H) 01/07/2021    NTBNP 958 (H) 12/02/2020     - lasix restarted  - Continue to monitor urine output  - Daily weights  - Cardiology following

## 2021-01-12 PROBLEM — R10.2 SUPRAPUBIC PAIN: Status: ACTIVE | Noted: 2021-01-12

## 2021-01-12 LAB
ALBUMIN SERPL BCP-MCNC: 3.2 G/DL (ref 3.5–5)
ALP SERPL-CCNC: 83 U/L (ref 46–116)
ALT SERPL W P-5'-P-CCNC: 55 U/L (ref 12–78)
ANION GAP SERPL CALCULATED.3IONS-SCNC: 9 MMOL/L (ref 4–13)
AST SERPL W P-5'-P-CCNC: 270 U/L (ref 5–45)
BASOPHILS # BLD AUTO: 0.05 THOUSANDS/ΜL (ref 0–0.1)
BASOPHILS NFR BLD AUTO: 0 % (ref 0–1)
BILIRUB SERPL-MCNC: 0.84 MG/DL (ref 0.2–1)
BUN SERPL-MCNC: 30 MG/DL (ref 5–25)
CALCIUM ALBUM COR SERPL-MCNC: 9.7 MG/DL (ref 8.3–10.1)
CALCIUM SERPL-MCNC: 9.1 MG/DL (ref 8.3–10.1)
CHLORIDE SERPL-SCNC: 100 MMOL/L (ref 100–108)
CO2 SERPL-SCNC: 23 MMOL/L (ref 21–32)
CREAT SERPL-MCNC: 1.09 MG/DL (ref 0.6–1.3)
EOSINOPHIL # BLD AUTO: 0 THOUSAND/ΜL (ref 0–0.61)
EOSINOPHIL NFR BLD AUTO: 0 % (ref 0–6)
ERYTHROCYTE [DISTWIDTH] IN BLOOD BY AUTOMATED COUNT: 13.6 % (ref 11.6–15.1)
GFR SERPL CREATININE-BSD FRML MDRD: 48 ML/MIN/1.73SQ M
GLUCOSE SERPL-MCNC: 156 MG/DL (ref 65–140)
HCT VFR BLD AUTO: 45.1 % (ref 34.8–46.1)
HGB BLD-MCNC: 14.7 G/DL (ref 11.5–15.4)
IMM GRANULOCYTES # BLD AUTO: 0.16 THOUSAND/UL (ref 0–0.2)
IMM GRANULOCYTES NFR BLD AUTO: 1 % (ref 0–2)
LIPASE SERPL-CCNC: 50 U/L (ref 73–393)
LYMPHOCYTES # BLD AUTO: 1.11 THOUSANDS/ΜL (ref 0.6–4.47)
LYMPHOCYTES NFR BLD AUTO: 5 % (ref 14–44)
MCH RBC QN AUTO: 30.4 PG (ref 26.8–34.3)
MCHC RBC AUTO-ENTMCNC: 32.6 G/DL (ref 31.4–37.4)
MCV RBC AUTO: 93 FL (ref 82–98)
MONOCYTES # BLD AUTO: 1.6 THOUSAND/ΜL (ref 0.17–1.22)
MONOCYTES NFR BLD AUTO: 7 % (ref 4–12)
NEUTROPHILS # BLD AUTO: 20.2 THOUSANDS/ΜL (ref 1.85–7.62)
NEUTS SEG NFR BLD AUTO: 87 % (ref 43–75)
NRBC BLD AUTO-RTO: 0 /100 WBCS
PLATELET # BLD AUTO: 258 THOUSANDS/UL (ref 149–390)
PMV BLD AUTO: 11.1 FL (ref 8.9–12.7)
POTASSIUM SERPL-SCNC: 4.6 MMOL/L (ref 3.5–5.3)
PROCALCITONIN SERPL-MCNC: 0.71 NG/ML
PROT SERPL-MCNC: 6.8 G/DL (ref 6.4–8.2)
RBC # BLD AUTO: 4.83 MILLION/UL (ref 3.81–5.12)
SODIUM SERPL-SCNC: 132 MMOL/L (ref 136–145)
WBC # BLD AUTO: 23.12 THOUSAND/UL (ref 4.31–10.16)

## 2021-01-12 PROCEDURE — 80053 COMPREHEN METABOLIC PANEL: CPT | Performed by: FAMILY MEDICINE

## 2021-01-12 PROCEDURE — 99233 SBSQ HOSP IP/OBS HIGH 50: CPT | Performed by: INTERNAL MEDICINE

## 2021-01-12 PROCEDURE — 85025 COMPLETE CBC W/AUTO DIFF WBC: CPT | Performed by: FAMILY MEDICINE

## 2021-01-12 PROCEDURE — 83690 ASSAY OF LIPASE: CPT | Performed by: INTERNAL MEDICINE

## 2021-01-12 PROCEDURE — 84145 PROCALCITONIN (PCT): CPT | Performed by: FAMILY MEDICINE

## 2021-01-12 PROCEDURE — 99232 SBSQ HOSP IP/OBS MODERATE 35: CPT | Performed by: INTERNAL MEDICINE

## 2021-01-12 PROCEDURE — C9113 INJ PANTOPRAZOLE SODIUM, VIA: HCPCS | Performed by: FAMILY MEDICINE

## 2021-01-12 RX ORDER — FUROSEMIDE 40 MG/1
40 TABLET ORAL DAILY
Status: DISCONTINUED | OUTPATIENT
Start: 2021-01-12 | End: 2021-01-13

## 2021-01-12 RX ORDER — LOPERAMIDE HYDROCHLORIDE 2 MG/1
2 CAPSULE ORAL 3 TIMES DAILY PRN
Status: DISCONTINUED | OUTPATIENT
Start: 2021-01-12 | End: 2021-01-17

## 2021-01-12 RX ORDER — POLYETHYLENE GLYCOL 3350 17 G/17G
17 POWDER, FOR SOLUTION ORAL DAILY
Status: DISCONTINUED | OUTPATIENT
Start: 2021-01-13 | End: 2021-01-12 | Stop reason: SDUPTHER

## 2021-01-12 RX ADMIN — HEPARIN SODIUM 5000 UNITS: 5000 INJECTION INTRAVENOUS; SUBCUTANEOUS at 15:09

## 2021-01-12 RX ADMIN — CEFTRIAXONE SODIUM 1000 MG: 10 INJECTION, POWDER, FOR SOLUTION INTRAVENOUS at 14:12

## 2021-01-12 RX ADMIN — ONDANSETRON 4 MG: 2 INJECTION INTRAMUSCULAR; INTRAVENOUS at 09:46

## 2021-01-12 RX ADMIN — METOPROLOL SUCCINATE 50 MG: 50 TABLET, EXTENDED RELEASE ORAL at 09:32

## 2021-01-12 RX ADMIN — DICLOFENAC SODIUM 2 G: 10 GEL TOPICAL at 09:51

## 2021-01-12 RX ADMIN — HEPARIN SODIUM 5000 UNITS: 5000 INJECTION INTRAVENOUS; SUBCUTANEOUS at 21:05

## 2021-01-12 RX ADMIN — ISOSORBIDE MONONITRATE 90 MG: 30 TABLET, EXTENDED RELEASE ORAL at 09:32

## 2021-01-12 RX ADMIN — DICLOFENAC SODIUM 2 G: 10 GEL TOPICAL at 12:00

## 2021-01-12 RX ADMIN — METRONIDAZOLE 500 MG: 500 INJECTION, SOLUTION INTRAVENOUS at 15:09

## 2021-01-12 RX ADMIN — PRASUGREL 10 MG: 10 TABLET, FILM COATED ORAL at 09:38

## 2021-01-12 RX ADMIN — LOSARTAN POTASSIUM 50 MG: 50 TABLET, FILM COATED ORAL at 09:32

## 2021-01-12 RX ADMIN — RANOLAZINE 1000 MG: 500 TABLET, FILM COATED, EXTENDED RELEASE ORAL at 17:24

## 2021-01-12 RX ADMIN — LOPERAMIDE HYDROCHLORIDE 2 MG: 2 CAPSULE ORAL at 15:39

## 2021-01-12 RX ADMIN — PANTOPRAZOLE SODIUM 40 MG: 40 INJECTION, POWDER, FOR SOLUTION INTRAVENOUS at 09:32

## 2021-01-12 RX ADMIN — METRONIDAZOLE 500 MG: 500 INJECTION, SOLUTION INTRAVENOUS at 06:05

## 2021-01-12 RX ADMIN — ATORVASTATIN CALCIUM 20 MG: 20 TABLET, FILM COATED ORAL at 15:39

## 2021-01-12 RX ADMIN — METRONIDAZOLE 500 MG: 500 INJECTION, SOLUTION INTRAVENOUS at 23:05

## 2021-01-12 RX ADMIN — RANOLAZINE 1000 MG: 500 TABLET, FILM COATED, EXTENDED RELEASE ORAL at 09:32

## 2021-01-12 RX ADMIN — DICLOFENAC SODIUM 2 G: 10 GEL TOPICAL at 17:24

## 2021-01-12 RX ADMIN — HEPARIN SODIUM 5000 UNITS: 5000 INJECTION INTRAVENOUS; SUBCUTANEOUS at 05:57

## 2021-01-12 RX ADMIN — METRONIDAZOLE 500 MG: 500 INJECTION, SOLUTION INTRAVENOUS at 00:01

## 2021-01-12 RX ADMIN — FUROSEMIDE 40 MG: 40 TABLET ORAL at 10:51

## 2021-01-12 RX ADMIN — OXYCODONE HYDROCHLORIDE 2.5 MG: 5 TABLET ORAL at 05:55

## 2021-01-12 RX ADMIN — PANTOPRAZOLE SODIUM 40 MG: 40 INJECTION, POWDER, FOR SOLUTION INTRAVENOUS at 21:05

## 2021-01-12 RX ADMIN — ASPIRIN 81 MG: 81 TABLET, COATED ORAL at 09:38

## 2021-01-12 RX ADMIN — DICLOFENAC SODIUM 2 G: 10 GEL TOPICAL at 21:07

## 2021-01-12 RX ADMIN — ACETAMINOPHEN 650 MG: 325 TABLET, FILM COATED ORAL at 04:13

## 2021-01-12 RX ADMIN — POLYETHYLENE GLYCOL 3350 17 G: 17 POWDER, FOR SOLUTION ORAL at 09:31

## 2021-01-12 NOTE — ED PROVIDER NOTES
EMERGENCY MEDICINE NOTE        PATIENT IDENTIFICATION PHYSICIAN/SERVICE INFORMATION   Name: Viji Isbell  MRN: [id number]  YOB: 1940  Age/Sex: 79 y o  female  Preferred Language: English  Code Status: Level 1 - Full Code  Encounter Date: 1/7/2021  Attending Physician: Emily Young MD  Admitting Physician: Suzanne Ken DO   Primary Care Physician: Jair Self MD         Primary Care Phone: [phone]       CHIEF COMPLAINT     Chief Complaint   Patient presents with    Chest Pain     EMS from home pt with 10/10 intermittent mid non radiating cp  -n/v + sob  324mg of asa given by EMS and pt took her own nitro at home            HISTORY OF PRESENT ILLNESS       History provided by:  Patient   used: No    Chest Pain  Pain location:  Substernal area  Pain quality: pressure    Pain quality comment:  "ripping"  Pain radiates to:  Upper back  Pain radiates to the back: yes    Pain severity:  Moderate  Onset quality:  Gradual  Timing:  Constant  Progression:  Unchanged  Chronicity:  New  Context: at rest    Context: not breathing, no drug use, not eating, no intercourse, not lifting, no movement, not raising an arm and no trauma    Relieved by:  Nothing  Worsened by:  Nothing tried  Ineffective treatments:  None tried  Associated symptoms: no abdominal pain, no AICD problem, no altered mental status, no anorexia, no anxiety, no back pain, no claudication, no cough, no diaphoresis, no dizziness, no dysphagia, no fatigue, no fever, no headache, no heartburn, no lower extremity edema, no nausea, no near-syncope, no numbness, no orthopnea, no palpitations, no PND, no shortness of breath, no syncope, not vomiting and no weakness    Risk factors: hypertension    Risk factors: no aortic disease, no birth control, no diabetes mellitus, no Beth-Danlos syndrome, no immobilization, not male, not obese, not pregnant, no prior DVT/PE, no smoking and no surgery          PAST MEDICAL AND SURGICAL HISTORY     Past Medical History:   Diagnosis Date    Anal fissure     Cardiac disorder     Esophageal reflux     Esophagitis, reflux     Hemorrhoids     Hepatic hemangioma     Last Assessed: 1/13/2015    Herpes zoster     History of colonic polyps     Hypertension     [facility] colitis ([facility] )     Lumbar herniated disc     Malignant neoplasm without specification of site ([facility] )     Nephrolithiasis     L  Lithotripsy    Nontoxic single thyroid nodule     Last Assessed: 1/13/2015    Osteoarthritis     Overactive bladder     Raynaud disease     Respiratory system disease     Sjogren's disease ([facility] )     Spinal stenosis     PONCHO (stress urinary incontinence, female)     Uterovaginal prolapse     Grade I-II       Past Surgical History:   Procedure Laterality Date    APPENDECTOMY  1947    CARDIAC SURGERY      CABG    CATARACT EXTRACTION Bilateral     COLONOSCOPY  2012    Fiberoptic    COLONOSCOPY      Resolved: 2006 - 2012 5 year f/u    CORONARY ANGIOPLASTY WITH STENT PLACEMENT      CORONARY ARTERY BYPASS GRAFT      Resolved: 2012    ESOPHAGOGASTRODUODENOSCOPY  2012    Diagnostic    HEMORROIDECTOMY      KNEE SURGERY      LITHOTRIPSY      Renal    MALIGNANT SKIN LESION EXCISION      Face; Resolved: 2004    AL ESOPHAGOGASTRODUODENOSCOPY TRANSORAL DIAGNOSTIC N/A 4/13/2016    Procedure: EGD AND COLONOSCOPY;  Surgeon: Ellie Mcnamara MD;  [facility];   Service: Gastroenterology    RENAL ARTERY STENT      SKIN LESION EXCISION      Scalp    SOFT TISSUE TUMOR RESECTION      Shoulder; Resolved: 1995    THROMBOLYSIS      Postoperative Thrombolysis PTCA    TONSILLECTOMY      Resolved: 64       Family History   Problem Relation Age of Onset    Heart disease Mother     Hypertension Mother     Osteoporosis Mother     Heart disease Father     Hypertension Father     Ulcerative colitis Family     Colon polyps Family        E-Cigarette/Vaping     E-Cigarette/Vaping Substances    Nicotine No     THC No     CBD No     Flavoring No     Other No     Unknown No      Social History     Tobacco Use    Smoking status: Never Smoker    Smokeless tobacco: Never Used   Substance Use Topics    Alcohol use: Yes     Frequency: Monthly or less     Comment: social    Drug use: No         ALLERGIES     Allergies   Allergen Reactions    Sulfa Antibiotics Anaphylaxis    Formaldehyde     Codeine Palpitations         HOME MEDICATIONS     Prior to Admission Medications   Prescriptions Last Dose Informant Patient Reported? Taking? amLODIPine (NORVASC) 10 mg tablet 1/7/2021 at Unknown time Self No Yes   Sig: Take 1 tablet (10 mg total) by mouth daily   aspirin (ECOTRIN LOW STRENGTH) 81 mg EC tablet 1/7/2021 at Unknown time Self Yes Yes   Sig: Take 81 mg by mouth daily  atorvastatin (LIPITOR) 20 mg tablet 1/7/2021 at Unknown time Self No Yes   Sig: Take 1 tablet (20 mg total) by mouth daily with dinner   docusate sodium (Colace) 100 mg capsule 1/7/2021 at Unknown time Self No Yes   Sig: Take 1 capsule (100 mg total) by mouth 2 (two) times a day   furosemide (LASIX) 40 mg tablet 1/7/2021 at Unknown time Self No Yes   Sig: Take 1 tablet (40 mg total) by mouth daily   isosorbide mononitrate (IMDUR) 30 mg 24 hr tablet 1/7/2021 at Unknown time  No Yes   Sig: Take 3 tablets (90 mg total) by mouth daily   metoprolol succinate (TOPROL-XL) 50 mg 24 hr tablet 1/7/2021 at Unknown time  No Yes   Sig: Take 1 tablet (50 mg total) by mouth daily   multivitamin (THERAGRAN) TABS  Self Yes Yes   Sig: Take 1 tablet by mouth daily     nitroglycerin (NITROSTAT) 0 4 mg SL tablet 1/7/2021 at Unknown time  No Yes   Sig: PLACE 1 TABLET (0 4 MG TOTAL) UNDER THE TONGUE EVERY 5 (FIVE) MINUTES AS NEEDED FOR CHEST PAIN   pantoprazole (PROTONIX) 40 mg tablet 1/7/2021 at Unknown time Self No Yes   Sig: Take 1 tablet (40 mg total) by mouth daily in the early morning   polyethylene glycol (MIRALAX) 17 g packet 1/7/2021 at Unknown time Self No Yes   Sig: Take 17 g by mouth daily   prasugrel (EFFIENT) tablet 1/7/2021 at Unknown time Self No Yes   Sig: TAKE 1 TABLET EVERY OTHER DAY   ranolazine (RANEXA) 1000 MG SR tablet 1/7/2021 at Unknown time Self Yes Yes   Sig: Take 1,000 mg by mouth 2 (two) times a day   senna (SENOKOT) 8 6 mg 1/7/2021 at Unknown time Self No Yes   Sig: Take 1 tablet (8 6 mg total) by mouth daily at bedtime      Facility-Administered Medications: None         REVIEW OF SYSTEMS     Review of Systems   Constitutional: Negative for activity change, appetite change, chills, diaphoresis, fatigue and fever  HENT: Negative for sore throat and trouble swallowing  Eyes: Negative for visual disturbance  Respiratory: Negative for apnea, cough, choking, chest tightness, shortness of breath, wheezing and stridor  Cardiovascular: Positive for chest pain  Negative for palpitations, orthopnea, claudication, leg swelling, syncope, PND and near-syncope  Gastrointestinal: Negative for abdominal distention, abdominal pain, anorexia, constipation, diarrhea, heartburn, nausea and vomiting  Genitourinary: Negative for decreased urine volume, difficulty urinating, dysuria, flank pain, frequency and hematuria  Musculoskeletal: Negative for back pain and neck pain  Skin: Negative for rash  Neurological: Negative for dizziness, weakness, light-headedness, numbness and headaches  Psychiatric/Behavioral: The patient is not nervous/anxious  All other systems reviewed and are negative          PHYSICAL EXAMINATION     ED Triage Vitals   Temperature Pulse Respirations Blood Pressure SpO2   01/07/21 1945 01/07/21 1945 01/07/21 1945 01/07/21 1945 01/07/21 1951   97 6 °F (36 4 °C) (!) 113 20 120/69 93 %      Temp Source Heart Rate Source Patient Position - Orthostatic VS BP Location FiO2 (%)   01/07/21 1945 01/07/21 1945 01/07/21 1945 01/07/21 1945 01/07/21 2218   Oral Monitor Lying Right arm 50      Pain Score       01/07/21 1945 Worst Possible Pain         Wt Readings from Last 3 Encounters:   01/11/21 61 8 kg (136 lb 4 8 oz)   01/05/21 63 1 kg (139 lb 3 2 oz)   12/23/20 62 4 kg (137 lb 9 1 oz)         Physical Exam  Vitals signs and nursing note reviewed  Constitutional:       General: She is not in acute distress  Appearance: She is well-developed  She is not ill-appearing, toxic-appearing or diaphoretic  HENT:      Head: Normocephalic and atraumatic  Eyes:      Conjunctiva/sclera: Conjunctivae normal       Pupils: Pupils are equal, round, and reactive to light  Neck:      Musculoskeletal: Normal range of motion and neck supple  Vascular: No JVD  Cardiovascular:      Rate and Rhythm: Normal rate and regular rhythm  Heart sounds: Normal heart sounds  No murmur  No friction rub  No gallop  No S3 or S4 sounds  Pulmonary:      Effort: Pulmonary effort is normal  No tachypnea or respiratory distress  Breath sounds: No stridor  Decreased breath sounds (bilateral bases) present  No wheezing, rhonchi or rales  Chest:      Chest wall: No tenderness  Musculoskeletal: Normal range of motion  Right lower leg: She exhibits no tenderness  No edema  Left lower leg: She exhibits no tenderness  No edema  Skin:     General: Skin is warm  Capillary Refill: Capillary refill takes less than 2 seconds  Coloration: Skin is not pale  Neurological:      Mental Status: She is alert and oriented to person, place, and time             DIAGNOSTIC RESULTS     Laboratory results:    Labs Reviewed   CBC AND DIFFERENTIAL - Abnormal       Result Value Ref Range Status    WBC 7 78  4 31 - 10 16 Thousand/uL Final    RBC 4 99  3 81 - 5 12 Million/uL Final    Hemoglobin 15 0  11 5 - 15 4 g/dL Final    Hematocrit 47 2 (*) 34 8 - 46 1 % Final    MCV 95  82 - 98 fL Final    MCH 30 1  26 8 - 34 3 pg Final    MCHC 31 8  31 4 - 37 4 g/dL Final    RDW 13 2  11 6 - 15 1 % Final    MPV 10 6  8 9 - 12 7 fL Final    Platelets 256  149 - 390 Thousands/uL Final    nRBC 0  /100 WBCs Final    Neutrophils Relative 65  43 - 75 % Final    Immat GRANS % 0  0 - 2 % Final    Lymphocytes Relative 26  14 - 44 % Final    Monocytes Relative 6  4 - 12 % Final    Eosinophils Relative 2  0 - 6 % Final    Basophils Relative 1  0 - 1 % Final    Neutrophils Absolute 5 11  1 85 - 7 62 Thousands/µL Final    Immature Grans Absolute 0 03  0 00 - 0 20 Thousand/uL Final    Lymphocytes Absolute 1 99  0 60 - 4 47 Thousands/µL Final    Monocytes Absolute 0 43  0 17 - 1 22 Thousand/µL Final    Eosinophils Absolute 0 17  0 00 - 0 61 Thousand/µL Final    Basophils Absolute 0 05  0 00 - 0 10 Thousands/µL Final   COMPREHENSIVE METABOLIC PANEL - Abnormal    Sodium 135 (*) 136 - 145 mmol/L Final    Potassium 5 2  3 5 - 5 3 mmol/L Final    Comment: Moderately Hemolyzed; Results May be Affected    Chloride 100  100 - 108 mmol/L Final    CO2 23  21 - 32 mmol/L Final    ANION GAP 12  4 - 13 mmol/L Final    BUN 24  5 - 25 mg/dL Final    Creatinine 1 12  0 60 - 1 30 mg/dL Final    Comment: Standardized to IDMS reference method    Glucose 137  65 - 140 mg/dL Final    Comment: If the patient is fasting, the ADA then defines impaired fasting glucose as > 100 mg/dL and diabetes as > or equal to 123 mg/dL  Specimen collection should occur prior to Sulfasalazine administration due to the potential for falsely depressed results  Specimen collection should occur prior to Sulfapyridine administration due to the potential for falsely elevated results  Calcium 9 7  8 3 - 10 1 mg/dL Final    AST 53 (*) 5 - 45 U/L Final    Comment: Moderately Hemolyzed; Results May be Affected  Specimen collection should occur prior to Sulfasalazine administration due to the potential for falsely depressed results  ALT 32  12 - 78 U/L Final    Comment: Specimen collection should occur prior to Sulfasalazine administration due to the potential for falsely depressed results       Alkaline Phosphatase 81 46 - 116 U/L Final    Total Protein 8 9 (*) 6 4 - 8 2 g/dL Final    Albumin 4 4  3 5 - 5 0 g/dL Final    Total Bilirubin 0 98  0 20 - 1 00 mg/dL Final    Comment: Use of this assay is not recommended for patients undergoing treatment with eltrombopag due to the potential for falsely elevated results  eGFR 46  ml/min/1 73sq m Final    Narrative:     [place] guidelines for Chronic Kidney Disease (CKD):     Stage 1 with normal or high GFR (GFR > 90 mL/min/1 73 square meters)    Stage 2 Mild CKD (GFR = 60-89 mL/min/1 73 square meters)    Stage 3A Moderate CKD (GFR = 45-59 mL/min/1 73 square meters)    Stage 3B Moderate CKD (GFR = 30-44 mL/min/1 73 square meters)    Stage 4 Severe CKD (GFR = 15-29 mL/min/1 73 square meters)    Stage 5 End Stage CKD (GFR <15 mL/min/1 73 square meters)  Note: GFR calculation is accurate only with a steady state creatinine   D-DIMER, QUANTITATIVE - Abnormal    D-Dimer, Quant 1 11 (*) <0 50 ug/ml FEU Final    Comment: Reference and upper limits to exclude DVT and PE are the same  Do not use to exclude if clinical symptoms are present  Pregnant women:  1st trimester:  <0 22 - 1 06 ug/ml FEU  2nd trimester:  <0 22 - 1 88 ug/ml FEU  3rd trimester:   0 24 - 3 28 ug/ml FEU    Note: Normal ranges may not apply to patients who are transgender, non-binary, or whose legal sex, sex at birth, and gender identity differ  NT-BNP PRO (BRAIN NATRIURETIC PEPTIDE) - Abnormal    NT-proBNP 1,254 (*) <450 pg/mL Final   COVID19, INFLUENZA A/B, RSV PCR, [facility] - Normal    SARS-CoV-2 Negative  Negative Final    Comment:      INFLUENZA A PCR Negative  Negative Final    Comment:      INFLUENZA B PCR Negative  Negative Final    Comment:      RSV PCR Negative  Negative Final    Comment:      Narrative: This test has been authorized by FDA under an EUA (Emergency Use Assay) for use by authorized laboratories    Clinical caution and judgement should be used with the interpretation of these results with consideration of the clinical impression and other laboratory testing  Testing reported as "Positive" or "Negative" has been proven to be accurate according to standard laboratory validation requirements  All testing is performed with control materials showing appropriate reactivity at standard intervals  TROPONIN I - Normal    Troponin I 0 02  <=0 04 ng/mL Final    Comment: Siemens Chemistry analyzer 99% cutoff is > 0 04 ng/mL in network labs     o cTnI 99% cutoff is useful only when applied to patients in the clinical setting of myocardial ischemia   o cTnI 99% cutoff should be interpreted in the context of clinical history, ECG findings and possibly cardiac imaging to establish correct diagnosis  o cTnI 99% cutoff may be suggestive but clearly not indicative of a coronary event without the clinical setting of myocardial ischemia  PROTIME-INR - Normal    Protime 11 9  11 6 - 14 5 seconds Final    INR 0 87  0 84 - 1 19 Final   APTT - Normal    PTT 31  23 - 37 seconds Final    Comment: Therapeutic Heparin Range =  60-90 seconds   GOLD TOP ON HOLD       All labs reviewed and utilized in the medical decision making process    Radiology results:    CT abdomen pelvis wo contrast   Final Result      Right lower lobe pneumonia  Small bilateral effusions  Gallstone without surrounding inflammation  Workstation performed: [id number]         XR chest portable   Final Result      1  Increasing interstitial opacities in the right lung  Pneumonia versus worsening edema   2  Small bilateral pleural effusions, increasing      The study was marked in EPIC for immediate notification  Workstation performed: [id number]         XR chest 1 view portable   Final Result      Increasing bilateral interstitial and hazy groundglass densities right greater than left  There may be trace effusions                    Workstation performed: [id number] CTA ED chest PE Study   Final Result         1  Findings suggestive of mild congestive heart failure  2   No evidence of acute pulmonary embolus  3   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  Workstation performed: [id number]         XR chest 1 view portable   Final Result      Vascular congestion  Workstation performed: [id number]             All radiology studies independently viewed by me and interpreted by the radiologist       PROCEDURES     ECG 12 Lead Documentation Only    Date/Time: 1/7/2021 8:18 PM  Performed by: Moses Chapin PA-C  Authorized by:  Moses Chapin PA-C     Indications / Diagnosis:  Chest pain  ECG reviewed by me, the ED Provider: yes    Patient location:  ED  Previous ECG:     Previous ECG:  Compared to current    Similarity:  No change    Comparison to cardiac monitor: Yes    Interpretation:     Interpretation: abnormal    Rate:     ECG rate assessment: normal    Rhythm:     Rhythm: sinus rhythm    Ectopy:     Ectopy: none    QRS:     QRS axis:  Normal    QRS intervals:  Normal  Conduction:     Conduction: abnormal      Abnormal conduction: complete LBBB    ST segments:     ST segments:  Normal  T waves:     T waves: non-specific    Other findings:     Other findings: LAE            Invasive Devices     Peripheral Intravenous Line            Peripheral IV 01/10/21 Right;Ventral (anterior) Forearm 1 day    Peripheral IV 01/11/21 Right Antecubital less than 1 day                ASSESSMENT AND PLAN     MDM  Number of Diagnoses or Management Options  Chest pain: new, needed workup  CHF (congestive heart failure) ([facility] [address] ): new, needed workup  Pulmonary edema: new, needed workup     Amount and/or Complexity of Data Reviewed  Clinical lab tests: ordered and reviewed  Tests in the radiology section of CPT®: ordered and reviewed  Tests in the medicine section of CPT®: reviewed and ordered  Obtain history from someone other than the patient: yes  Review and summarize past medical records: yes  Discuss the patient with other providers: yes  Independent visualization of images, tracings, or specimens: yes    Risk of Complications, Morbidity, and/or Mortality  Presenting problems: high  Diagnostic procedures: high  Management options: high    Patient Progress  Patient progress: stable      Initial ED assessment:  Rosie Conner is a 79 y o  female with significant PMH for HTN who brought to ED via EMS for Chest Pain  Vitals signs reviewed and WNL  Physical examination is unremarkable    Initial Ddx  includes but is not limited to:   OLD -  ACS, MI, PE, PTX, pneumonia, dissection, pleurisy, pericarditis, myocarditis, rhabdomyolysis, GI etiology  Initial ED plan:   Plan will be to perform diagnostic testing of Blood labs, EKG and XR of chest and treat symptomatically   Final ED summary/disposition: Pt returned from CT in respiratory distress, rales  ADMIT MEDICINE: Discussed course of care with Patient who is agreeable to admission for further eval, treatment  Called inpatient   [facility] Hospitalist who is aware of the patient, case discussed including HPI, pertinent PMH, ED Course, and workup   Hospitalist agreed with plan and will accept for admission/observation under the medicine service    MDM  Reviewed: previous chart, nursing note and vitals  Reviewed previous: labs and ECG  Interpretation: labs, ECG, x-ray and CT scan          ED COURSE OF CARE AND REASSESSMENT                HEART Risk Score      Most Recent Value   Heart Score Risk Calculator   History  1 Filed at: 01/07/2021 2327   ECG  1 Filed at: 01/07/2021 2327   Age  2 Filed at: 01/07/2021 2327   Risk Factors  2 Filed at: 01/07/2021 2327   Troponin  0 Filed at: 01/07/2021 2327   HEART Score  6 Filed at: 01/07/2021 2327                [address]  Rule for PE      Most Recent Value   PERC Rule for PE   Age >=50  1 Filed at: 01/07/2021 2041   HR >=100  1 Filed at: 01/07/2021 2041   O2 Sat on room air < 95%  0 Filed at: 01/07/2021 2041   History of PE or DVT  0 Filed at: 01/07/2021 2041   Recent trauma or surgery  0 Filed at: 01/07/2021 2041   Hemoptysis  0 Filed at: 01/07/2021 2041   Exogenous estrogen  0 Filed at: 01/07/2021 2041   Unilateral leg swelling  0 Filed at: 01/07/2021 2041   PERC Rule for PE Results  2 Filed at: 01/07/2021 2041              CLARE Risk Score      Most Recent Value   Age >= 72  1 Filed at: 01/08/2021 1382   Known CAD (stenosis >= 50%)  1 Filed at: 01/08/2021 4015   Recent (<=24 hrs) Service Angina  1 Filed at: 01/08/2021 0055   ST Deviation >= 0 5 mm     3+ CAD Risk Factors (FHx, HTN, HLP, DM, Smoker)     Aspirin Use Past 7 Days  1 Filed at: 01/08/2021 0055   Elevated Cardiac Markers  0 Filed at: 01/08/2021 0055   CLARE Risk Score (Calculated)  Tatianna Camacho' Criteria for PE      Most Recent Value   Wells' Criteria for PE   Clinical signs and symptoms of DVT  0 Filed at: 01/07/2021 2040   PE is primary diagnosis or equally likely  3 Filed at: 01/07/2021 2040   HR >100  1 5 Filed at: 01/07/2021 2040   Immobilization at least 3 days or Surgery in the previous 4 weeks  0 Filed at: 01/07/2021 2040   Previous, objectively diagnosed PE or DVT  0 Filed at: 01/07/2021 2040   Hemoptysis  0 Filed at: 01/07/2021 2040   Malignancy with treatment within 6 months or palliative  0 Filed at: 01/07/2021 2040   Wells' Criteria Total  4 5 Filed at: 01/07/2021 2040            Medications   ondansetron (ZOFRAN) injection 4 mg (0 mg Intravenous Hold 1/7/21 2152)   fentanyl citrate (PF) 100 MCG/2ML 50 mcg (0 mcg Intravenous Hold 1/7/21 2159)   atorvastatin (LIPITOR) tablet 20 mg (20 mg Oral Given 1/11/21 1801)   isosorbide mononitrate (IMDUR) 24 hr tablet 90 mg (0 mg Oral Hold 1/11/21 0953)   metoprolol succinate (TOPROL-XL) 24 hr tablet 50 mg (0 mg Oral Hold 1/11/21 0954)   nitroglycerin (NITROSTAT) SL tablet 0 4 mg (0 4 mg Sublingual Given 1/11/21 1004)   polyethylene glycol (MIRALAX) packet 17 g (0 g Oral Not Given 1/11/21 0807)   ranolazine (RANEXA) 12 hr tablet 1,000 mg (1,000 mg Oral Given 1/11/21 1801)   senna (SENOKOT) tablet 8 6 mg (8 6 mg Oral Given 1/11/21 2125)   ondansetron (ZOFRAN) injection 4 mg (4 mg Intravenous Given 1/11/21 0826)   simethicone (MYLICON) chewable tablet 80 mg (80 mg Oral Given 1/11/21 1013)   acetaminophen (TYLENOL) tablet 650 mg (650 mg Oral Given 1/11/21 2125)   Diclofenac Sodium (VOLTAREN) 1 % topical gel 2 g (2 g Topical Given 1/11/21 2126)   morphine injection 1 mg (1 mg Intravenous Given 1/11/21 1150)   oxyCODONE (ROXICODONE) IR tablet 2 5 mg (2 5 mg Oral Given 1/10/21 0712)   losartan (COZAAR) tablet 50 mg (0 mg Oral Hold 1/11/21 0953)   heparin (porcine) subcutaneous injection 5,000 Units (5,000 Units Subcutaneous Given 1/11/21 2125)   aspirin (ECOTRIN LOW STRENGTH) EC tablet 81 mg (has no administration in time range)   aluminum-magnesium hydroxide-simethicone (MYLANTA) oral suspension 30 mL (has no administration in time range)   sodium chloride 0 9 % infusion (0 mL/hr Intravenous Hold 1/11/21 1130)   morphine injection 1 mg (1 mg Intravenous Not Given 1/11/21 1207)   heparin (porcine) 25,000 units in 0 45% NaCl 250 mL infusion (premix) (0 Units/kg/hr × 60 kg (Order-Specific) Intravenous Stopped 1/11/21 1420)   heparin (porcine) injection 3,600 Units (has no administration in time range)   heparin (porcine) injection 1,800 Units (has no administration in time range)   pantoprazole (PROTONIX) injection 40 mg (40 mg Intravenous Given 1/11/21 2125)   metroNIDAZOLE (FLAGYL) IVPB (premix) 500 mg 100 mL (500 mg Intravenous New Bag 1/12/21 0001)   cefTRIAXone (ROCEPHIN) 1,000 mg in dextrose 5 % 50 mL IVPB (has no administration in time range)   morphine injection 1 mg (1 mg Intravenous Not Given 1/11/21 8748)   prasugrel (EFFIENT) tablet 10 mg (has no administration in time range)   levalbuterol (XOPENEX) inhalation solution 1 25 mg (has no administration in time range)   sodium chloride 0 9 % inhalation solution 3 mL (has no administration in time range)   iohexol (OMNIPAQUE) 350 MG/ML injection (SINGLE-DOSE) 100 mL (100 mL Intravenous Given 1/7/21 2127)   nitroglycerin (NITROSTAT) SL tablet 0 4 mg (0 4 mg Sublingual Given 1/7/21 2201)   nitroglycerin (NITRO-BID) 2 % TD ointment 1 inch (1 inch Topical Given 1/7/21 2215)   furosemide (LASIX) injection 40 mg (40 mg Intravenous Given 1/8/21 0158)   aspirin tablet 325 mg (325 mg Oral Given 1/8/21 0540)   aspirin chewable tablet 324 mg (324 mg Oral Given 1/11/21 0506)   potassium chloride (K-DUR,KLOR-CON) CR tablet 40 mEq (40 mEq Oral Given 1/10/21 1726)   metoprolol (LOPRESSOR) injection 5 mg (5 mg Intravenous Given 1/11/21 1117)   furosemide (LASIX) injection 40 mg (40 mg Intravenous Given 1/11/21 1137)   potassium chloride 20 mEq IVPB (premix) (20 mEq Intravenous New Bag 1/11/21 1300)   potassium chloride 20 mEq IVPB (premix) (20 mEq Intravenous New Bag 1/11/21 1534)   heparin (porcine) injection 3,600 Units (3,600 Units Intravenous Given 1/11/21 1312)   fentanyl citrate (PF) 100 MCG/2ML (25 mcg Intravenous Given 1/11/21 1420)   midazolam (VERSED) injection (0 5 mg Intravenous Given 1/11/21 1420)   lidocaine (PF) (XYLOCAINE-MPF) 1 % injection (10 mL Infiltration Given 1/11/21 1421)   cefTRIAXone (ROCEPHIN) 1,000 mg in dextrose 5 % 50 mL IVPB (0 mg Intravenous Stopped 1/11/21 1644)   heparin (porcine) injection (6,000 Units Intravenous Given 1/11/21 1430)   Labetalol HCl (NORMODYNE) injection (20 mg Intravenous Given 1/11/21 1500)   iohexol (OMNIPAQUE) 350 MG/ML injection (SINGLE-DOSE) (140 mL Intravenous Given 1/11/21 1502)   sodium chloride 0 9 % infusion (0 mL/hr  Stopped 1/11/21 1604)   furosemide (LASIX) injection (20 mg Intravenous Given 1/11/21 1510)         FINAL IMPRESSION     Final diagnoses:   Pulmonary edema   Chest pain   CHF (congestive heart failure) ([facility])         DISPOSITION AND PLANNING Time reflects when diagnosis was documented in both MDM as applicable and the Disposition within this note     Time User Action Codes Description Comment    1/7/2021 10:28 PM Dallis Salo Add [J81 1] Pulmonary edema     1/7/2021 10:28 PM Dallis Salo Add [R07 9] Chest pain     1/7/2021 10:28 PM Dallis Salo Add [I50 9] CHF (congestive heart failure) ([facility] [address] )     1/8/2021 12:31 AM Jarod Merrill Add [I25 10] Coronary artery disease involving native heart without angina pectoris, unspecified vessel or lesion type     1/8/2021 12:31 AM Jarod Merrill Modify [I25 10] Coronary artery disease involving native heart without angina pectoris, unspecified vessel or lesion type       ED Disposition     ED Disposition Condition Date/Time Comment    Admit Stable Thu Jan 7, 2021 10:28 PM Case was discussed with [facility] [place] Flora Mcallister and the patient's admission status was agreed to be Admission Status: inpatient status to the service of Dr Goldie Shell   Follow-up Information    None             PATIENT REFERRAL     No follow-up provider specified  DISCHARGE MEDICATIONS     Current Discharge Medication List      CONTINUE these medications which have NOT CHANGED    Details   amLODIPine (NORVASC) 10 mg tablet Take 1 tablet (10 mg total) by mouth daily  Qty: 30 tablet, Refills: 0    Associated Diagnoses: Essential hypertension      aspirin (ECOTRIN LOW STRENGTH) 81 mg EC tablet Take 81 mg by mouth daily        atorvastatin (LIPITOR) 20 mg tablet Take 1 tablet (20 mg total) by mouth daily with dinner  Qty: 30 tablet, Refills: 0    Associated Diagnoses: Coronary artery disease involving native heart without angina pectoris, unspecified vessel or lesion type      docusate sodium (Colace) 100 mg capsule Take 1 capsule (100 mg total) by mouth 2 (two) times a day  Qty: 60 capsule, Refills: 0    Associated Diagnoses: Constipation      furosemide (LASIX) 40 mg tablet Take 1 tablet (40 mg total) by mouth daily  Qty: 30 tablet, Refills: 0    Associated Diagnoses: Acute pulmonary edema (HCC)      isosorbide mononitrate (IMDUR) 30 mg 24 hr tablet Take 3 tablets (90 mg total) by mouth daily  Qty: 270 tablet, Refills: 3    Associated Diagnoses: Chest pain      metoprolol succinate (TOPROL-XL) 50 mg 24 hr tablet Take 1 tablet (50 mg total) by mouth daily  Qty: 90 tablet, Refills: 3    Associated Diagnoses: Chest pain      multivitamin (THERAGRAN) TABS Take 1 tablet by mouth daily  nitroglycerin (NITROSTAT) 0 4 mg SL tablet PLACE 1 TABLET (0 4 MG TOTAL) UNDER THE TONGUE EVERY 5 (FIVE) MINUTES AS NEEDED FOR CHEST PAIN  Qty: 25 tablet, Refills: 0    Comments: DX Code Needed  NEED REFILLS  Associated Diagnoses: Coronary artery disease involving native heart without angina pectoris, unspecified vessel or lesion type      pantoprazole (PROTONIX) 40 mg tablet Take 1 tablet (40 mg total) by mouth daily in the early morning  Qty: 30 tablet, Refills: 0    Associated Diagnoses: Chest pain      polyethylene glycol (MIRALAX) 17 g packet Take 17 g by mouth daily  Qty: 30 each, Refills: 0    Associated Diagnoses: Constipation      prasugrel (EFFIENT) tablet TAKE 1 TABLET EVERY OTHER DAY  Qty: 30 tablet, Refills: 0    Associated Diagnoses: Coronary arteriosclerosis      ranolazine (RANEXA) 1000 MG SR tablet Take 1,000 mg by mouth 2 (two) times a day      senna (SENOKOT) 8 6 mg Take 1 tablet (8 6 mg total) by mouth daily at bedtime  Qty: 30 tablet, Refills: 0    Associated Diagnoses: Constipation             No discharge procedures on file      PDMP Review     None          CARLY Frank PA-C  01/12/21 5755

## 2021-01-12 NOTE — ASSESSMENT & PLAN NOTE
Pain in suprapubic region on palpation during physical exam  Bladder scan revealed approximately 500cc urine in bladder     - Patient encouraged to use commode, urinated about 200cc  - will continue to encourage commode use

## 2021-01-12 NOTE — PROGRESS NOTES
Progress Note - Jayson Maxwell 1940, 79 y o  female MRN: [id number]    Unit/Bed#: S -01 Encounter: [id number]    Primary Care Provider: Wicho Bass MD   Date and time admitted to hospital: 1/7/2021  7:41 PM        * Acute combined systolic and diastolic congestive heart failure ([address] )  Assessment & Plan  Wt Readings from Last 3 Encounters:   01/11/21 61 8 kg (136 lb 4 8 oz)   01/05/21 63 1 kg (139 lb 3 2 oz)   12/23/20 62 4 kg (137 lb 9 1 oz)       Presented with increased shortness of breath, dyspnea on exertion and lower extremity edema  29/6 ECHO: Systolic function was mildly reduced, Ejection fraction was estimated to be 45 %  There was mild diffuse hypokinesis (grade 1 diastolic dysfunction)  Lab Results   Component Value Date    NTBNP 1,254 (H) 01/07/2021    NTBNP 958 (H) 12/02/2020     - lasix restarted  - Continue to monitor urine output  - Daily weights  - Cardiology following        Suprapubic pain  Assessment & Plan  Pain in suprapubic region on palpation during physical exam  Bladder scan revealed approximately 500cc urine in bladder     - Patient encouraged to use commode, urinated about 200cc  - will continue to encourage commode use    Respiratory distress  Assessment & Plan  Patient required o2 via NRB  States she had vomited earlier which may have caused an aspiration  WBC count elevated 19,000 today  CXR concerning for RLL pneumonia        - Continue flagyl and rocephin  - Monitor vitals      Elevated troponin  Assessment & Plan  Latest troponin 3 29 (1/11/2021) after patient stated she had abdominal/chest pain    - Cardiology on board  - Cardiac cath completed, stent placed Left main  - heparin restarted    Pulmonary edema cardiac cause [facility])  Assessment & Plan  Wt Readings from Last 3 Encounters:   01/11/21 61 8 kg (136 lb 4 8 oz)   01/05/21 63 1 kg (139 lb 3 2 oz)   12/23/20 62 4 kg (137 lb 9 1 oz)     · Cardiology following   · Continue Lasix 40mg PO   · Patient on 6L o2  · CXR revealed RLL infiltrate  · Xopenex ordered  · Continue to monitor    Chest pain  Assessment & Plan    Initial chest pain located in the right radiating to both arms  Pain was described as squeezing, tearing and rated 10/10  Alleviating factors: Nitroglycerin and rest  Worsened by: deep inspiration, emotional stress and walking    Recent Labs     01/10/21  0438 21  1201   TROPONINI 0 57* 3 29*        Plan:   - Cardiac cath completed 2021  Stent placed left main  Signs of mesenteric ischemia noted  - Continue heparin and lasix as per cardiology    Hyperlipidemia  Assessment & Plan  Continue Lipitor    CAD (coronary artery disease)  Assessment & Plan  · Continue Lipitor  · Isosorbide mononitrate 90 mg daily  · Metoprolol 24 hour tablet 50 mg daily  · Effient 10 mg every other day, this was confirmed with prescription  · Ranexa 1000 mg b i d         VTE Pharmacologic Prophylaxis:   Pharmacologic: Heparin  Mechanical VTE Prophylaxis in Place: Yes    Discussions with Specialists or Other Care Team Provider: Cardiology    Education and Discussions with Family / Patient: Patient    Current Length of Stay: 5 day(s)    Current Patient Status: Inpatient     Discharge Plan / Estimated Discharge Date: TBD    Code Status: Level 1 - Full Code      Subjective:   Patient was seen at bedside in some distress  States she had pain in her abdomen  She continues to use 6L with a NRB  She reports having an episode of vomiting this morning  Objective:     Vitals:   Temp (24hrs), Av 5 °F (36 4 °C), Min:96 4 °F (35 8 °C), Max:98 °F (36 7 °C)    Temp:  [96 4 °F (35 8 °C)-98 °F (36 7 °C)] 97 8 °F (36 6 °C)  HR:  [] 99  Resp:  [18-20] 18  BP: (118-169)/(55-76) 156/68  SpO2:  [91 %-96 %] 95 %  Body mass index is 26 7 kg/m²  Input and Output Summary (last 24 hours):        Intake/Output Summary (Last 24 hours) at 2021 1521  Last data filed at 2021 1505  Gross per 24 hour   Intake 180 ml   Output 600 ml   Net -420 ml       Physical Exam:     Physical Exam  Constitutional:       General: She is not in acute distress  Appearance: She is well-developed  She is not diaphoretic  HENT:      Head: Normocephalic and atraumatic  Eyes:      General: No scleral icterus  Right eye: No discharge  Left eye: No discharge  Conjunctiva/sclera: Conjunctivae normal    Cardiovascular:      Rate and Rhythm: Normal rate and regular rhythm  Heart sounds: Normal heart sounds  No murmur  Pulmonary:      Effort: Respiratory distress present  Breath sounds: Wheezing present  Abdominal:      Palpations: Abdomen is soft  Tenderness: There is abdominal tenderness  Comments: Suprapubic tenderness     Musculoskeletal: Normal range of motion  General: No tenderness  Lymphadenopathy:      Cervical: No cervical adenopathy  Skin:     General: Skin is warm and dry  Coloration: Skin is not pale  Findings: No erythema  Neurological:      Mental Status: She is alert  Psychiatric:      Comments: Distressed             Additional Data:     Labs:    Results from last 7 days   Lab Units 01/12/21  0635   WBC Thousand/uL 23 12*   HEMOGLOBIN g/dL 14 7   HEMATOCRIT % 45 1   PLATELETS Thousands/uL 258   NEUTROS PCT % 87*   LYMPHS PCT % 5*   MONOS PCT % 7   EOS PCT % 0     Results from last 7 days   Lab Units 01/12/21  0635   POTASSIUM mmol/L 4 6   CHLORIDE mmol/L 100   CO2 mmol/L 23   BUN mg/dL 30*   CREATININE mg/dL 1 09   CALCIUM mg/dL 9 1   ALK PHOS U/L 83   ALT U/L 55   AST U/L 270*     Results from last 7 days   Lab Units 01/11/21  1248   INR  0 91       * I Have Reviewed All Lab Data Listed Above  * Additional Pertinent Lab Tests Reviewed:  Jason Nagy Admission Reviewed    Imaging:    Imaging Reports Reviewed Today Include: CXR  Imaging Personally Reviewed by Myself Includes:  CXR    Recent Cultures (last 7 days):           Last 24 Hours Medication List: Current Facility-Administered Medications   Medication Dose Route Frequency Provider Last Rate    acetaminophen  650 mg Oral Q4H PRN Amanda Vail MD      aluminum-magnesium hydroxide-simethicone  30 mL Oral Q4H PRN KATHY Grande      aspirin  81 mg Oral Daily KATHY Castillo      atorvastatin  20 mg Oral Daily With Alfredo Godoy MD      cefTRIAXone  1,000 mg Intravenous Q24H Saumya Delacruz MD 1,000 mg (01/12/21 1412)    Diclofenac Sodium  2 g Topical 4x Daily Amanda Vail MD      fentanyl citrate (PF)  50 mcg Intravenous Once PAULA KiranC      furosemide  40 mg Oral Daily Kingsley Steward MD      heparin (porcine)  5,000 Units Subcutaneous [facility] Ángela Clayton,       isosorbide mononitrate  90 mg Oral Daily Amanda Vail MD      levalbuterol  1 25 mg Nebulization Q6H PRN Judy Lovell DO      losartan  50 mg Oral Daily DYANA Acosta-C      metoprolol succinate  50 mg Oral Daily Amanda Vail MD      metroNIDAZOLE  500 mg Intravenous Q8H Saumya Delacruz  mg (01/12/21 1509)    morphine injection  1 mg Intravenous Q4H PRN Amanda Vail MD      morphine injection  1 mg Intravenous Once KATHY Horowitz      morphine injection  1 mg Intravenous Once KATHY Grande      nitroglycerin  0 4 mg Sublingual Q5 Min PRN Amanda Vail MD      ondansetron  4 mg Intravenous Once Moses Chapin PA-C      ondansetron  4 mg Intravenous Q6H PRN Amanda Vail MD      oxyCODONE  2 5 mg Oral Q4H PRN Amanda Vail MD      pantoprazole  40 mg Intravenous Q12H [address] Saumya Delacruz MD      polyethylene glycol  17 g Oral Daily Amanda Vail MD      prasugrel  10 mg Oral Daily KATHY Grande      ranolazine  1,000 mg Oral BID Amanda Vail MD      senna  8 6 mg Oral HS Amanda Vail MD      simethicone  80 mg Oral 4x Daily PRN Lukas Hwang MD      sodium chloride  3 mL Nebulization Q6H PRN Coreen Lindsey DO          Today, Patient Was Seen By: Nikki Miller MD    ** Please Note: This note has been constructed using a voice recognition system   **

## 2021-01-12 NOTE — PROGRESS NOTES
Cardiology Progress Note - Jenni Olivares 79 y o  female MRN: [id number]    Unit/Bed#: S -01 Encounter: [id number]      Assessment/Recommendations:  1  Acute on chronic diastolic CHF: with pulmonary edema  Now off lasix with cath and dye load  Will stop IVF and start on maintenance PO lasix today  2   CAD: with prior CABG and closure of LIMA to LAD, now s/p cath and LAMINE to LM and LAD  No further chest pain  Continue ASA, statin, prasugrel, and B-blocker  3   NSTEMI: likely type 1 with significant underlying CAD  S/p DESx2 yesterday  4   ICM: EF 45%, continued on B-blocker and ARB  Will start on maintenance diuretic today  5   HTN: relatively at goal for age  Continue current regimen  6   HLD: continued on statin  7   Bilateral renal artery stenosis: continue BP management  Subjective:   Patient seen and examined  No significant events overnight   ; pertinent negatives - chest pain, chest pressure/discomfort, lower extremity edema and palpitations  Objective:     Vitals: Blood pressure 151/68, pulse 101, temperature (!) 97 4 °F (36 3 °C), temperature source Oral, resp  rate 20, height 5' (1 524 m), weight 62 kg (136 lb 11 2 oz), SpO2 95 %, not currently breastfeeding , Body mass index is 26 7 kg/m² ,   Orthostatic Blood Pressures      Most Recent Value   Blood Pressure  151/68 filed at 01/12/2021 0515   Patient Position - Orthostatic VS  Lying filed at 01/12/2021 0515            Intake/Output Summary (Last 24 hours) at 1/12/2021 0925  Last data filed at 1/12/2021 0723  Gross per 24 hour   Intake 250 ml   Output 400 ml   Net -150 ml       TELE: No significant arrhythmias seen      Physical Exam:    GEN: Jenni Olivares appears well, alert and oriented x 3, pleasant and cooperative   HEENT: pupils equal, round, and reactive to light; extraocular muscles intact  NECK: supple, no carotid bruits   HEART: regular rhythm, normal S1 and S2, +systolic murmur, no clicks, gallops or rubs   LUNGS: clear to auscultation bilaterally; no wheezes, rales, or rhonchi   ABDOMEN: normal bowel sounds, soft, no tenderness, no distention  EXTREMITIES: peripheral pulses normal; no clubbing, cyanosis, or edema  NEURO: no focal findings   SKIN: normal without suspicious lesions on exposed skin    Medications:      Current Facility-Administered Medications:     acetaminophen (TYLENOL) tablet 650 mg, 650 mg, Oral, Q4H PRN, Elizabet Villafana MD, 650 mg at 01/12/21 0413    aluminum-magnesium hydroxide-simethicone (MYLANTA) oral suspension 30 mL, 30 mL, Oral, Q4H PRN, KATHY Horowitz    aspirin (ECOTRIN LOW STRENGTH) EC tablet 81 mg, 81 mg, Oral, Daily, KATHY Eckert    atorvastatin (LIPITOR) tablet 20 mg, 20 mg, Oral, Daily With Dinner, Elizabet Villafana MD, 20 mg at 01/11/21 1801    cefTRIAXone (ROCEPHIN) 1,000 mg in dextrose 5 % 50 mL IVPB, 1,000 mg, Intravenous, Q24H, Deanne Marcial MD    Diclofenac Sodium (VOLTAREN) 1 % topical gel 2 g, 2 g, Topical, 4x Daily, Elizabet Villafana MD, 2 g at 01/11/21 2126    fentanyl citrate (PF) 100 MCG/2ML 50 mcg, 50 mcg, Intravenous, Once, Jeffrey Munoz PA-C, Stopped at 01/07/21 2159    heparin (porcine) 25,000 units in 0 45% NaCl 250 mL infusion (premix), 3-20 Units/kg/hr (Order-Specific), Intravenous, Titrated, KATHY Nova, Stopped at 01/11/21 1420    heparin (porcine) injection 1,800 Units, 1,800 Units, Intravenous, Q1H PRN, Algernon Scott, CRNP    heparin (porcine) injection 3,600 Units, 3,600 Units, Intravenous, Q1H PRN, Algernon Scott, CRNP    heparin (porcine) subcutaneous injection 5,000 Units, 5,000 Units, Subcutaneous, Q8H [address], Jaylen Fillers, DO, 5,000 Units at 01/12/21 0557    isosorbide mononitrate (IMDUR) 24 hr tablet 90 mg, 90 mg, Oral, Daily, Elizabet Villafana MD, Stopped at 01/11/21 0953    levalbuterol (XOPENEX) inhalation solution 1 25 mg, 1 25 mg, Nebulization, Q6H PRN, Jaylen Fillers, DO    losartan (COZAAR) tablet 50 mg, 50 mg, Oral, Daily, Kasey Shirley PA-C, Stopped at 01/11/21 0953    metoprolol succinate (TOPROL-XL) 24 hr tablet 50 mg, 50 mg, Oral, Daily, Layne Billings MD, Stopped at 01/11/21 0954    metroNIDAZOLE (FLAGYL) IVPB (premix) 500 mg 100 mL, 500 mg, Intravenous, Q8H, Chyna Zhong MD, Last Rate: 200 mL/hr at 01/12/21 0605, 500 mg at 01/12/21 0605    morphine injection 1 mg, 1 mg, Intravenous, Q4H PRN, Layne Billings MD, 1 mg at 01/11/21 1150    morphine injection 1 mg, 1 mg, Intravenous, Once, KATHY Horowitz    morphine injection 1 mg, 1 mg, Intravenous, Once, KATHY Horowitz    nitroglycerin (NITROSTAT) SL tablet 0 4 mg, 0 4 mg, Sublingual, Q5 Min PRN, Layne Billings MD, 0 4 mg at 01/11/21 1004    ondansetron [facility]) injection 4 mg, 4 mg, Intravenous, Once, Lord Tiffanie PA-C, Stopped at 01/07/21 2152    ondansetron (ZOFRAN) injection 4 mg, 4 mg, Intravenous, Q6H PRN, Layne Billings MD, 4 mg at 01/11/21 0826    oxyCODONE (ROXICODONE) IR tablet 2 5 mg, 2 5 mg, Oral, Q4H PRN, Layne Billings MD, 2 5 mg at 01/12/21 0555    pantoprazole (PROTONIX) injection 40 mg, 40 mg, Intravenous, Q12H [address], Chyna Zhong MD, 40 mg at 01/11/21 2125    polyethylene glycol (MIRALAX) packet 17 g, 17 g, Oral, Daily, Layne Billings MD, 17 g at 01/09/21 0941    prasugrel (EFFIENT) tablet 10 mg, 10 mg, Oral, Daily, KATHY Horowitz    ranolazine (RANEXA) 12 hr tablet 1,000 mg, 1,000 mg, Oral, BID, Layne Billings MD, 1,000 mg at 01/11/21 1801    senna (SENOKOT) tablet 8 6 mg, 8 6 mg, Oral, HS, Layne Billings MD, 8 6 mg at 01/11/21 2125    simethicone (MYLICON) chewable tablet 80 mg, 80 mg, Oral, 4x Daily PRN, Layne Billings MD, 80 mg at 01/11/21 1013    sodium chloride 0 9 % infusion, 50 mL/hr, Intravenous, Continuous, KATHY Horowitz, Stopped at 01/11/21 1130    sodium chloride 0 9 % inhalation solution 3 mL, 3 mL, Nebulization, Q6H PRN, Fabio Benedict, DO     Labs & Results:    Results from last 7 days   Lab Units 01/11/21  1201 01/10/21  0438 01/08/21  0730   TROPONIN I ng/mL 3 29* 0 57* 2 36*     Results from last 7 days   Lab Units 01/12/21  0635 01/11/21  1208 01/10/21  0438   WBC Thousand/uL 23 12* 19 96* 6 51   HEMOGLOBIN g/dL 14 7 14 8 11 6   HEMATOCRIT % 45 1 44 9 35 7   PLATELETS Thousands/uL 258 319 206         Results from last 7 days   Lab Units 01/12/21  0635 01/11/21  1208 01/10/21  0411 01/09/21  0448   POTASSIUM mmol/L 4 6 3 4* 3 4*  3 4* 3 5   CHLORIDE mmol/L 100 96* 98*  100 101   CO2 mmol/L 23 24 28  29 27   BUN mg/dL 30* 32* 25  23 26*   CREATININE mg/dL 1 09 1 35* 0 97  0 98 0 98   CALCIUM mg/dL 9 1 9 5 8 9  8 9 8 2*   ALK PHOS U/L 83 92  --  58   ALT U/L 55 28  --  21   AST U/L 270* 65*  --  30     Results from last 7 days   Lab Units 01/11/21  1248 01/11/21  0429 01/09/21  0448 01/08/21  2340  01/07/21 2014   INR  0 91 0 91  --   --   --  0 87   PTT seconds 21*  --  >210* 30   < > 31    < > = values in this interval not displayed       Results from last 7 days   Lab Units 01/11/21  1208 01/09/21  0448 01/08/21  0503   MAGNESIUM mg/dL 2 2 2 1 2 1       Echo: personally reviewed - EF 45%, mild diffuse HK, LVH, G1DD, mild to mod MR, mod pulm HTN    EKG personally reviewed by Chikis Canseco MD

## 2021-01-13 ENCOUNTER — APPOINTMENT (INPATIENT)
Dept: CT IMAGING | Facility: HOSPITAL | Age: 81
DRG: 246 | End: 2021-01-13
Payer: MEDICARE

## 2021-01-13 LAB
ALBUMIN SERPL BCP-MCNC: 2.6 G/DL (ref 3.5–5)
ALP SERPL-CCNC: 68 U/L (ref 46–116)
ALT SERPL W P-5'-P-CCNC: 39 U/L (ref 12–78)
ANION GAP SERPL CALCULATED.3IONS-SCNC: 9 MMOL/L (ref 4–13)
AST SERPL W P-5'-P-CCNC: 120 U/L (ref 5–45)
BASOPHILS # BLD AUTO: 0.03 THOUSANDS/ΜL (ref 0–0.1)
BASOPHILS # BLD AUTO: 0.03 THOUSANDS/ΜL (ref 0–0.1)
BASOPHILS NFR BLD AUTO: 0 % (ref 0–1)
BASOPHILS NFR BLD AUTO: 0 % (ref 0–1)
BILIRUB SERPL-MCNC: 0.83 MG/DL (ref 0.2–1)
BUN SERPL-MCNC: 23 MG/DL (ref 5–25)
CALCIUM ALBUM COR SERPL-MCNC: 9.7 MG/DL (ref 8.3–10.1)
CALCIUM SERPL-MCNC: 8.6 MG/DL (ref 8.3–10.1)
CHLORIDE SERPL-SCNC: 99 MMOL/L (ref 100–108)
CO2 SERPL-SCNC: 23 MMOL/L (ref 21–32)
CREAT SERPL-MCNC: 1.04 MG/DL (ref 0.6–1.3)
EOSINOPHIL # BLD AUTO: 0 THOUSAND/ΜL (ref 0–0.61)
EOSINOPHIL # BLD AUTO: 0 THOUSAND/ΜL (ref 0–0.61)
EOSINOPHIL NFR BLD AUTO: 0 % (ref 0–6)
EOSINOPHIL NFR BLD AUTO: 0 % (ref 0–6)
ERYTHROCYTE [DISTWIDTH] IN BLOOD BY AUTOMATED COUNT: 13.7 % (ref 11.6–15.1)
ERYTHROCYTE [DISTWIDTH] IN BLOOD BY AUTOMATED COUNT: 13.9 % (ref 11.6–15.1)
GFR SERPL CREATININE-BSD FRML MDRD: 51 ML/MIN/1.73SQ M
GLUCOSE SERPL-MCNC: 102 MG/DL (ref 65–140)
GLUCOSE SERPL-MCNC: 117 MG/DL (ref 65–140)
GLUCOSE SERPL-MCNC: 129 MG/DL (ref 65–140)
GLUCOSE SERPL-MCNC: 130 MG/DL (ref 65–140)
HCT VFR BLD AUTO: 32.5 % (ref 34.8–46.1)
HCT VFR BLD AUTO: 34.7 % (ref 34.8–46.1)
HGB BLD-MCNC: 10.4 G/DL (ref 11.5–15.4)
HGB BLD-MCNC: 11.3 G/DL (ref 11.5–15.4)
IMM GRANULOCYTES # BLD AUTO: 0.08 THOUSAND/UL (ref 0–0.2)
IMM GRANULOCYTES # BLD AUTO: 0.09 THOUSAND/UL (ref 0–0.2)
IMM GRANULOCYTES NFR BLD AUTO: 1 % (ref 0–2)
IMM GRANULOCYTES NFR BLD AUTO: 1 % (ref 0–2)
LACTATE SERPL-SCNC: 1.4 MMOL/L (ref 0.5–2)
LACTATE SERPL-SCNC: 2.1 MMOL/L (ref 0.5–2)
LYMPHOCYTES # BLD AUTO: 0.84 THOUSANDS/ΜL (ref 0.6–4.47)
LYMPHOCYTES # BLD AUTO: 1.01 THOUSANDS/ΜL (ref 0.6–4.47)
LYMPHOCYTES NFR BLD AUTO: 7 % (ref 14–44)
LYMPHOCYTES NFR BLD AUTO: 8 % (ref 14–44)
MCH RBC QN AUTO: 29.7 PG (ref 26.8–34.3)
MCH RBC QN AUTO: 30.4 PG (ref 26.8–34.3)
MCHC RBC AUTO-ENTMCNC: 32 G/DL (ref 31.4–37.4)
MCHC RBC AUTO-ENTMCNC: 32.6 G/DL (ref 31.4–37.4)
MCV RBC AUTO: 93 FL (ref 82–98)
MCV RBC AUTO: 93 FL (ref 82–98)
MONOCYTES # BLD AUTO: 0.98 THOUSAND/ΜL (ref 0.17–1.22)
MONOCYTES # BLD AUTO: 1.06 THOUSAND/ΜL (ref 0.17–1.22)
MONOCYTES NFR BLD AUTO: 8 % (ref 4–12)
MONOCYTES NFR BLD AUTO: 8 % (ref 4–12)
NEUTROPHILS # BLD AUTO: 10.09 THOUSANDS/ΜL (ref 1.85–7.62)
NEUTROPHILS # BLD AUTO: 10.69 THOUSANDS/ΜL (ref 1.85–7.62)
NEUTS SEG NFR BLD AUTO: 83 % (ref 43–75)
NEUTS SEG NFR BLD AUTO: 84 % (ref 43–75)
NRBC BLD AUTO-RTO: 0 /100 WBCS
NRBC BLD AUTO-RTO: 0 /100 WBCS
PLATELET # BLD AUTO: 178 THOUSANDS/UL (ref 149–390)
PLATELET # BLD AUTO: 179 THOUSANDS/UL (ref 149–390)
PMV BLD AUTO: 11.6 FL (ref 8.9–12.7)
PMV BLD AUTO: 11.8 FL (ref 8.9–12.7)
POTASSIUM SERPL-SCNC: 3.8 MMOL/L (ref 3.5–5.3)
PROCALCITONIN SERPL-MCNC: 0.83 NG/ML
PROCALCITONIN SERPL-MCNC: 1.35 NG/ML
PROT SERPL-MCNC: 6.1 G/DL (ref 6.4–8.2)
RBC # BLD AUTO: 3.5 MILLION/UL (ref 3.81–5.12)
RBC # BLD AUTO: 3.72 MILLION/UL (ref 3.81–5.12)
SODIUM SERPL-SCNC: 131 MMOL/L (ref 136–145)
WBC # BLD AUTO: 12.2 THOUSAND/UL (ref 4.31–10.16)
WBC # BLD AUTO: 12.7 THOUSAND/UL (ref 4.31–10.16)

## 2021-01-13 PROCEDURE — 84145 PROCALCITONIN (PCT): CPT | Performed by: NURSE PRACTITIONER

## 2021-01-13 PROCEDURE — 99232 SBSQ HOSP IP/OBS MODERATE 35: CPT | Performed by: INTERNAL MEDICINE

## 2021-01-13 PROCEDURE — 87040 BLOOD CULTURE FOR BACTERIA: CPT | Performed by: NURSE PRACTITIONER

## 2021-01-13 PROCEDURE — 74177 CT ABD & PELVIS W/CONTRAST: CPT

## 2021-01-13 PROCEDURE — 82948 REAGENT STRIP/BLOOD GLUCOSE: CPT

## 2021-01-13 PROCEDURE — 85025 COMPLETE CBC W/AUTO DIFF WBC: CPT | Performed by: NURSE PRACTITIONER

## 2021-01-13 PROCEDURE — 83605 ASSAY OF LACTIC ACID: CPT | Performed by: NURSE PRACTITIONER

## 2021-01-13 PROCEDURE — 84145 PROCALCITONIN (PCT): CPT | Performed by: INTERNAL MEDICINE

## 2021-01-13 PROCEDURE — C9113 INJ PANTOPRAZOLE SODIUM, VIA: HCPCS | Performed by: FAMILY MEDICINE

## 2021-01-13 PROCEDURE — 85025 COMPLETE CBC W/AUTO DIFF WBC: CPT | Performed by: INTERNAL MEDICINE

## 2021-01-13 PROCEDURE — G1004 CDSM NDSC: HCPCS

## 2021-01-13 PROCEDURE — 80053 COMPREHEN METABOLIC PANEL: CPT | Performed by: INTERNAL MEDICINE

## 2021-01-13 RX ORDER — FUROSEMIDE 40 MG/1
40 TABLET ORAL
Status: DISCONTINUED | OUTPATIENT
Start: 2021-01-13 | End: 2021-01-14

## 2021-01-13 RX ORDER — METOPROLOL SUCCINATE 25 MG/1
25 TABLET, EXTENDED RELEASE ORAL ONCE
Status: COMPLETED | OUTPATIENT
Start: 2021-01-13 | End: 2021-01-13

## 2021-01-13 RX ADMIN — METOPROLOL SUCCINATE 25 MG: 25 TABLET, EXTENDED RELEASE ORAL at 10:40

## 2021-01-13 RX ADMIN — FUROSEMIDE 40 MG: 40 TABLET ORAL at 08:51

## 2021-01-13 RX ADMIN — DICLOFENAC SODIUM 2 G: 10 GEL TOPICAL at 23:57

## 2021-01-13 RX ADMIN — PANTOPRAZOLE SODIUM 40 MG: 40 INJECTION, POWDER, FOR SOLUTION INTRAVENOUS at 08:50

## 2021-01-13 RX ADMIN — RANOLAZINE 1000 MG: 500 TABLET, FILM COATED, EXTENDED RELEASE ORAL at 08:51

## 2021-01-13 RX ADMIN — PANTOPRAZOLE SODIUM 40 MG: 40 INJECTION, POWDER, FOR SOLUTION INTRAVENOUS at 21:39

## 2021-01-13 RX ADMIN — LOSARTAN POTASSIUM 50 MG: 50 TABLET, FILM COATED ORAL at 08:51

## 2021-01-13 RX ADMIN — SODIUM CHLORIDE 1000 ML: 0.9 INJECTION, SOLUTION INTRAVENOUS at 18:02

## 2021-01-13 RX ADMIN — HEPARIN SODIUM 5000 UNITS: 5000 INJECTION INTRAVENOUS; SUBCUTANEOUS at 13:10

## 2021-01-13 RX ADMIN — ONDANSETRON 4 MG: 2 INJECTION INTRAMUSCULAR; INTRAVENOUS at 09:25

## 2021-01-13 RX ADMIN — PRASUGREL 10 MG: 10 TABLET, FILM COATED ORAL at 08:51

## 2021-01-13 RX ADMIN — DICLOFENAC SODIUM 2 G: 10 GEL TOPICAL at 13:11

## 2021-01-13 RX ADMIN — SODIUM CHLORIDE 1000 ML: 0.9 INJECTION, SOLUTION INTRAVENOUS at 18:08

## 2021-01-13 RX ADMIN — IOHEXOL 100 ML: 350 INJECTION, SOLUTION INTRAVENOUS at 16:27

## 2021-01-13 RX ADMIN — METRONIDAZOLE 500 MG: 500 INJECTION, SOLUTION INTRAVENOUS at 18:01

## 2021-01-13 RX ADMIN — HEPARIN SODIUM 5000 UNITS: 5000 INJECTION INTRAVENOUS; SUBCUTANEOUS at 21:39

## 2021-01-13 RX ADMIN — METOPROLOL SUCCINATE 50 MG: 50 TABLET, EXTENDED RELEASE ORAL at 08:51

## 2021-01-13 RX ADMIN — CEFTRIAXONE SODIUM 1000 MG: 10 INJECTION, POWDER, FOR SOLUTION INTRAVENOUS at 13:10

## 2021-01-13 RX ADMIN — ASPIRIN 81 MG: 81 TABLET, COATED ORAL at 08:51

## 2021-01-13 RX ADMIN — HEPARIN SODIUM 5000 UNITS: 5000 INJECTION INTRAVENOUS; SUBCUTANEOUS at 06:50

## 2021-01-13 RX ADMIN — ISOSORBIDE MONONITRATE 90 MG: 30 TABLET, EXTENDED RELEASE ORAL at 08:50

## 2021-01-13 RX ADMIN — DICLOFENAC SODIUM 2 G: 10 GEL TOPICAL at 08:52

## 2021-01-13 RX ADMIN — METRONIDAZOLE 500 MG: 500 INJECTION, SOLUTION INTRAVENOUS at 06:50

## 2021-01-13 NOTE — PROGRESS NOTES
Progress Note - Ana Schneider 1940, 79 y o  female MRN: [id number]    Unit/Bed#: S -01 Encounter: [id number]    Primary Care Provider: Erma Kim MD   Date and time admitted to hospital: 1/7/2021  7:41 PM        * Acute combined systolic and diastolic congestive heart failure ([address] )  Assessment & Plan  Wt Readings from Last 3 Encounters:   01/12/21 62 kg (136 lb 11 2 oz)   01/05/21 63 1 kg (139 lb 3 2 oz)   12/23/20 62 4 kg (137 lb 9 1 oz)       Presented with increased shortness of breath, dyspnea on exertion and lower extremity edema  36/8 ECHO: Systolic function was mildly reduced, Ejection fraction was estimated to be 45 %  There was mild diffuse hypokinesis (grade 1 diastolic dysfunction)  Lab Results   Component Value Date    NTBNP 1,254 (H) 01/07/2021    NTBNP 958 (H) 12/02/2020     - lasix restarted  - Continue to monitor urine output  - Daily weights  - Cardiology following        Suprapubic pain  Assessment & Plan  Pain in suprapubic region on palpation during physical exam  Bladder scan revealed approximately 400cc urine in bladder     - Patient encouraged to use commode, only small amount of urine expelled  - will continue to encourage commode use  - Straight cath as needed    Respiratory distress  Assessment & Plan  Patient requiring 3L o2 via NC  States she had vomited 1/11/2021 which may have caused an aspiration  WBC count elevated 12,000 today, trending down  CXR concerning for RLL pneumonia        - Continue flagyl and rocephin  - Monitor vitals      Elevated troponin  Assessment & Plan  Latest troponin 3 29 (1/11/2021) after patient stated she had abdominal/chest pain    - Cardiology on board  - Cardiac cath completed, stent placed Left main  - heparin restarted    Pulmonary edema cardiac cause [facility])  Assessment & Plan  Wt Readings from Last 3 Encounters:   01/12/21 62 kg (136 lb 11 2 oz)   01/05/21 63 1 kg (139 lb 3 2 oz)   12/23/20 62 4 kg (137 lb 9 1 oz)     · Cardiology following   · Continue Lasix 40mg PO   · Patient on 3L o2  · CXR revealed RLL infiltrate  · Xopenex ordered  · Continue to monitor    Chest pain  Assessment & Plan  Improved  Initial chest pain located in the right radiating to both arms  Pain was described as squeezing, tearing and rated 10/10  Alleviating factors: Nitroglycerin and rest  Worsened by: deep inspiration, emotional stress and walking    Recent Labs     21  1201   TROPONINI 3 29*        Plan:   - Cardiac cath completed 2021  Stent placed left main  Signs of mesenteric ischemia noted  - Continue heparin and lasix as per cardiology    Hyperlipidemia  Assessment & Plan  Continue Lipitor    CAD (coronary artery disease)  Assessment & Plan  · Continue Lipitor  · Isosorbide mononitrate 90 mg daily  · Metoprolol 24 hour tablet increased to 75 mg daily as per cardiology  · Effient 10 mg every other day, this was confirmed with prescription  · Ranexa 1000 mg b i d         VTE Pharmacologic Prophylaxis:   Pharmacologic: Heparin  Mechanical VTE Prophylaxis in Place: Yes    Discussions with Specialists or Other Care Team Provider: Hospitalist  Nursing    Education and Discussions with Family / Patient: Patient    Current Length of Stay: 6 day(s)    Current Patient Status: Inpatient     Discharge Plan / Estimated Discharge Date: TBD    Code Status: Level 1 - Full Code      Subjective:   Patient was seen at bedside resting comfortably  She did have some suprapubic tenderness, similar to the feeling she had yesterday she says  She is alert and oriented x3 today  States chest pain has resolved  Objective:     Vitals:   Temp (24hrs), Av 9 °F (36 6 °C), Min:97 3 °F (36 3 °C), Max:98 3 °F (36 8 °C)    Temp:  [97 3 °F (36 3 °C)-98 3 °F (36 8 °C)] 97 3 °F (36 3 °C)  HR:  [] 87  Resp:  [18-20] 20  BP: ()/(53-60) 87/60  SpO2:  [95 %-99 %] 95 %  Body mass index is 26 7 kg/m²  Input and Output Summary (last 24 hours):        Intake/Output Summary (Last 24 hours) at 1/13/2021 1320  Last data filed at 1/13/2021 1114  Gross per 24 hour   Intake 60 ml   Output 800 ml   Net -740 ml       Physical Exam:     Physical Exam  Constitutional:       General: She is not in acute distress  Appearance: She is well-developed  She is not diaphoretic  HENT:      Head: Normocephalic and atraumatic  Eyes:      General: No scleral icterus  Right eye: No discharge  Left eye: No discharge  Conjunctiva/sclera: Conjunctivae normal    Cardiovascular:      Rate and Rhythm: Normal rate and regular rhythm  Heart sounds: Normal heart sounds  No murmur  Pulmonary:      Effort: Respiratory distress present  Breath sounds: Wheezing present  Abdominal:      Palpations: Abdomen is soft  Tenderness: There is abdominal tenderness  Comments: Suprapubic tenderness     Musculoskeletal: Normal range of motion  General: No tenderness  Lymphadenopathy:      Cervical: No cervical adenopathy  Skin:     General: Skin is warm and dry  Coloration: Skin is not pale  Findings: No erythema  Neurological:      Mental Status: She is alert  Additional Data:     Labs:    Results from last 7 days   Lab Units 01/13/21  0840   WBC Thousand/uL 12 70*   HEMOGLOBIN g/dL 11 3*   HEMATOCRIT % 34 7*   PLATELETS Thousands/uL 179   NEUTROS PCT % 84*   LYMPHS PCT % 7*   MONOS PCT % 8   EOS PCT % 0     Results from last 7 days   Lab Units 01/13/21  0647   POTASSIUM mmol/L 3 8   CHLORIDE mmol/L 99*   CO2 mmol/L 23   BUN mg/dL 23   CREATININE mg/dL 1 04   CALCIUM mg/dL 8 6   ALK PHOS U/L 68   ALT U/L 39   AST U/L 120*     Results from last 7 days   Lab Units 01/11/21  1248   INR  0 91       * I Have Reviewed All Lab Data Listed Above  * Additional Pertinent Lab Tests Reviewed:  All Labs For Current Hospital Admission Reviewed    Imaging:    Imaging Reports Reviewed Today Include: N/A  Imaging Personally Reviewed by Myself Includes: N/A    Recent Cultures (last 7 days):           Last 24 Hours Medication List:   Current Facility-Administered Medications   Medication Dose Route Frequency Provider Last Rate    acetaminophen  650 mg Oral Q4H PRN Perez Ayala MD      aluminum-magnesium hydroxide-simethicone  30 mL Oral Q4H PRN KATHY Caruso      aspirin  81 mg Oral Daily Em KATHY Neff      atorvastatin  20 mg Oral Daily With Sera Rich MD      cefTRIAXone  1,000 mg Intravenous Q24H Letha Owusu MD 1,000 mg (01/13/21 1310)    Diclofenac Sodium  2 g Topical 4x Daily Perez Ayala MD      fentanyl citrate (PF)  50 mcg Intravenous Once Be Beth PA-C      furosemide  40 mg Oral BID (diuretic) KATHY Caruso      heparin (porcine)  5,000 Units Subcutaneous Q8H [address], DO      isosorbide mononitrate  90 mg Oral Daily Perez Ayala MD      levalbuterol  1 25 mg Nebulization Q6H PRN Maame Ken DO      loperamide  2 mg Oral TID PRN Giovana Austin MD      losartan  50 mg Oral Daily Kasey Shirley PA-C      [START ON 1/14/2021] metoprolol succinate  75 mg Oral Daily KATHY Caruso      metroNIDAZOLE  500 mg Intravenous Q8H Letha Owusu  mg (01/13/21 0650)    morphine injection  1 mg Intravenous Q4H PRN Perez Ayala MD      morphine injection  1 mg Intravenous Once KATHY Horowitz      morphine injection  1 mg Intravenous Once KATHY Caruso      nitroglycerin  0 4 mg Sublingual Q5 Min PRN Perez Ayala MD      ondansetron  4 mg Intravenous Once Fouzia Prather PA-C      ondansetron  4 mg Intravenous Q6H PRN Perez Ayala MD      oxyCODONE  2 5 mg Oral Q4H PRN Perez Ayala MD      pantoprazole  40 mg Intravenous Q12H [address] Letha Owusu MD      polyethylene glycol  17 g Oral Daily Perez Ayala MD      prasugrel  10 mg Oral Daily KATHY Caruso  ranolazine  1,000 mg Oral BID Allyssa Ogden MD      senna  8 6 mg Oral HS Allyssa Ogden MD      simethicone  80 mg Oral 4x Daily PRN Allyssa Ogden MD      sodium chloride  3 mL Nebulization Q6H PRN Rosa Mcgarry DO          Today, Patient Was Seen By: Stepan Rabago MD    ** Please Note: This note has been constructed using a voice recognition system   **

## 2021-01-13 NOTE — ASSESSMENT & PLAN NOTE
· Continue Lipitor  · Isosorbide mononitrate 90 mg daily  · Metoprolol 24 hour tablet increased to 75 mg daily as per cardiology  · Effient 10 mg every other day, this was confirmed with prescription  · Ranexa 1000 mg b i d

## 2021-01-13 NOTE — RAPID RESPONSE
Progress Note - Rapid Response   Concepcion Jalloh 79 y o  female MRN: [id number]    Time Called ( Time): 8333  Date Called: 1/13/2021  Level of Care: MS  Room#: S423  Arrival Time ( Time): 3935  Event End Time ( Time): 9821  Primary reason for call: Acute change in SBP and Acute change in mental status  Interventions:  Airway/Breathing:  O2 Mask/Nasal  Circulation: IV Fluid Bolus  Other Treatments: N/A       Assessment:   1  Severe Sepsis  2  Abd pain    Plan:   · 30 cc/kg of NSS ordered 1860 cc total; BC, lactate, CBC ordered; will order U/A and C&S; Stat CT A/P to eval abd pain; will upgrade to SD level 2       HPI/Chief Complaint (Background/Situation):   Concepcion Jalloh is a 79y o  year old female who presents with hypotension and AMS  Has been admitted since 1/8 for CHF but developed abd pain on 1/11 and had CT scan w/o contrast  No significant finding except for RLL effusion/ consolidation suspicious for PNA  Nurse today found the patient to be hypotensive and called RR  She was found lying in bed with lethargy but arousible and confused to place and time  Suspect severe sepsis and followed pathway  Blood cultures, lactate and IVF ordered  Dr Yocasta Jaimes at bedside ok to upgrade pt to Jeovanny Bazzi, send for CT scan  Results back shows nonspecific enteritis/colitis, likely infectious  No bowel obstruction or bowel pneumatosis  Right lower lobe consolidation has resolved  Historical Information   Past Medical History:   Diagnosis Date    Anal fissure     Cardiac disorder     Esophageal reflux     Esophagitis, reflux     Hemorrhoids     Hepatic hemangioma     Last Assessed: 1/13/2015    Herpes zoster     History of colonic polyps     Hypertension     Ischemic colitis ([address] )     Lumbar herniated disc     Malignant neoplasm without specification of site ([address] )     Nephrolithiasis     L   Lithotripsy    Nontoxic single thyroid nodule     Last Assessed: 1/13/2015    Osteoarthritis     Overactive bladder     Raynaud disease     Respiratory system disease     Sjogren's disease ([address] )     Spinal stenosis     PONCHO (stress urinary incontinence, female)     Uterovaginal prolapse     Grade I-II     Past Surgical History:   Procedure Laterality Date    APPENDECTOMY  1947    CARDIAC SURGERY      CABG    CATARACT EXTRACTION Bilateral     COLONOSCOPY  2012    Fiberoptic    COLONOSCOPY      Resolved: 2006 - 2012 5 year f/u    CORONARY ANGIOPLASTY WITH STENT PLACEMENT      CORONARY ARTERY BYPASS GRAFT      Resolved: 2012    ESOPHAGOGASTRODUODENOSCOPY  2012    Diagnostic    HEMORROIDECTOMY      KNEE SURGERY      LITHOTRIPSY      Renal    MALIGNANT SKIN LESION EXCISION      Face; Resolved: 2004    ME ESOPHAGOGASTRODUODENOSCOPY TRANSORAL DIAGNOSTIC N/A 4/13/2016    Procedure: EGD AND COLONOSCOPY;  Surgeon: Shruthi Kaminski MD;  [facility];   Service: Gastroenterology    RENAL ARTERY STENT      SKIN LESION EXCISION      Scalp    SOFT TISSUE TUMOR RESECTION      Shoulder; Resolved: 1995    THROMBOLYSIS      Postoperative Thrombolysis PTCA    TONSILLECTOMY      Resolved: 1944     Social History   Social History     Substance and Sexual Activity   Alcohol Use Yes    Frequency: Monthly or less    Comment: social     Social History     Substance and Sexual Activity   Drug Use No     Social History     Tobacco Use   Smoking Status Never Smoker   Smokeless Tobacco Never Used     Family History: non-contributory    Meds/Allergies   Current Facility-Administered Medications   Medication Dose Route Frequency Provider Last Rate    acetaminophen  650 mg Oral Q4H PRN Brice Sun MD      aluminum-magnesium hydroxide-simethicone  30 mL Oral Q4H PRN KATHY Avila      aspirin  81 mg Oral Daily KATHY Brothers      atorvastatin  20 mg Oral Daily With Blima Laughter, MD      cefTRIAXone  1,000 mg Intravenous Q24H Emiliano Cunningham MD 1,000 mg (01/13/21 1310)    Diclofenac Sodium  2 g Topical 4x Daily Ramon Peterson MD      fentanyl citrate (PF)  50 mcg Intravenous Once Gautam Aguilar PA-C      furosemide  40 mg Oral BID (diuretic) KATHY Gao      heparin (porcine)  5,000 Units Subcutaneous Q8H [address], DO      isosorbide mononitrate  90 mg Oral Daily Ramon Peterson MD      levalbuterol  1 25 mg Nebulization Q6H PRN Twylla , DO      loperamide  2 mg Oral TID PRN Luis Roper MD      losartan  50 mg Oral Daily Kasey Shirley PA-C      [START ON 1/14/2021] metoprolol succinate  75 mg Oral Daily KATHY Gao      metroNIDAZOLE  500 mg Intravenous Q8H Lisa Meade  mg (01/13/21 0650)    morphine injection  1 mg Intravenous Q4H PRN Ramon Peterson MD      morphine injection  1 mg Intravenous Once KATHY Horowitz      morphine injection  1 mg Intravenous Once KATHY Gao      nitroglycerin  0 4 mg Sublingual Q5 Min PRN Ramon Peterson MD      ondansetron  4 mg Intravenous Once Gautam Aguilar PA-C      ondansetron  4 mg Intravenous Q6H PRN Ramon Peterson MD      oxyCODONE  2 5 mg Oral Q4H PRN Ramon Peterson MD      pantoprazole  40 mg Intravenous Q12H [address] Lisa Meade MD      polyethylene glycol  17 g Oral Daily Ramon Peterson MD      prasugrel  10 mg Oral Daily KATHY Gao      ranolazine  1,000 mg Oral BID Ramon Peterson MD      senna  8 6 mg Oral HS Ramon Peterson MD      simethicone  80 mg Oral 4x Daily PRN Ramon Peterson MD      sodium chloride  1,000 mL Intravenous Once KATHY Joshua      Followed by   [facility] sodium chloride  1,000 mL Intravenous Once KATHY Joshua      sodium chloride  3 mL Nebulization Q6H PRN Twylla , DO              Allergies   Allergen Reactions    Sulfa Antibiotics Anaphylaxis    Formaldehyde     Codeine Palpitations       ROS: Review of Systems   Unable to perform ROS: Mental status change     Vitals: 82- 24- 78/40- 97% 2L NC    Physical Exam:  Physical Exam  Vitals signs and nursing note reviewed  Constitutional:       General: She is in acute distress  Appearance: Normal appearance  She is normal weight  HENT:      Head: Normocephalic and atraumatic  Eyes:      Extraocular Movements: Extraocular movements intact  Pupils: Pupils are equal, round, and reactive to light  Cardiovascular:      Rate and Rhythm: Normal rate and regular rhythm  Pulmonary:      Effort: Pulmonary effort is normal       Breath sounds: Normal breath sounds  Abdominal:      General: Abdomen is flat  There is distension  Palpations: Abdomen is soft  Tenderness: There is abdominal tenderness in the right upper quadrant, epigastric area, suprapubic area, left upper quadrant and left lower quadrant  Skin:     General: Skin is warm and dry  Capillary Refill: Capillary refill takes less than 2 seconds  Coloration: Skin is pale  Neurological:      General: No focal deficit present  Mental Status: She is alert  She is disoriented  Intake/Output Summary (Last 24 hours) at 1/13/2021 1710  Last data filed at 1/13/2021 1114  Gross per 24 hour   Intake 30 ml   Output 700 ml   Net -670 ml       Respiratory    Lab Data (Last 4 hours)    None         O2/Vent Data (Last 4 hours)    None              Invasive Devices     Peripheral Intravenous Line            Peripheral IV 01/10/21 Right;Ventral (anterior) Forearm 3 days    Peripheral IV 01/11/21 Right Antecubital 2 days                DIAGNOSTIC DATA:    Lab: I have personally reviewed pertinent lab results     CBC:   Results from last 7 days   Lab Units 01/13/21  1518   WBC Thousand/uL 12 20*   HEMOGLOBIN g/dL 10 4*   HEMATOCRIT % 32 5*   PLATELETS Thousands/uL 178     CMP:   Results from last 7 days   Lab Units 01/13/21  0647 01/12/21  0635 01/11/21  1208   POTASSIUM mmol/L 3 8 4 6 3 4*   CHLORIDE mmol/L 99* 100 96*   CO2 mmol/L 23 23 24   BUN mg/dL 23 30* 32*   CREATININE mg/dL 1 04 1 09 1 35*   CALCIUM mg/dL 8 6 9 1 9 5   ALK PHOS U/L 68 83 92   ALT U/L 39 55 28   AST U/L 120* 270* 65*     PT/INR:   No results found for: PT, INR,   Magnesium: No components found for: MAG,   Phosphorous: No results found for: PHOS    Microbiology:  Lab Results   Component Value Date    BLOODCX No Growth After 5 Days  08/03/2019    BLOODCX No Growth After 5 Days  08/03/2019    URINECX >100,000 cfu/ml Klebsiella pneumoniae (A) 06/25/2020    URINECX >100,000 cfu/ml Escherichia coli (A) 01/28/2020    URINECX >100,000 cfu/ml Escherichia coli (A) 08/21/2019         OUTCOME:   Transfer to step down unit  Family notified of transfer: yes  Family member contacted: Called by Dr Eriberto Cody  Code Status: Level 1 - Full Code  Critical Care Time: Total Critical Care time spent 0 minutes excluding procedures, teaching and family updates

## 2021-01-13 NOTE — QUICK NOTE
Received message from nurse regarding tachycardia with HR in the 90s-100s  Upon bedside evaluation, patient is well-appearing, no active complaints  Rhythm is regular, +S1, +S2, no murmurs  On evaluation of telemetry, does have short bursts lasting a few seconds of tachycardia in the 120s- does not appear like VTach as QRS morphology is the same as when she is in sinus rhythm  Will repeat EKG and obtain morning labs and re-evaluate   Will signout to day team

## 2021-01-13 NOTE — PLAN OF CARE
Problem: Nutrition/Hydration-ADULT  Goal: Nutrient/Hydration intake appropriate for improving, restoring or maintaining nutritional needs  Description: Monitor and assess patient's nutrition/hydration status for malnutrition  Collaborate with interdisciplinary team and initiate plan and interventions as ordered  Monitor patient's weight and dietary intake as ordered or per policy  Utilize nutrition screening tool and intervene as necessary  Determine patient's food preferences and provide high-protein, high-caloric foods as appropriate  INTERVENTIONS:  - Monitor oral intake, urinary output, labs, and treatment plans  - Assess nutrition and hydration status and recommend course of action  - Evaluate amount of meals eaten  - Assist patient with eating if necessary   - Allow adequate time for meals  - Recommend/ encourage appropriate diets, oral nutritional supplements, and vitamin/mineral supplements  - Order, calculate, and assess calorie counts as needed  - Recommend, monitor, and adjust tube feedings and TPN/PPN based on assessed needs  - Assess need for intravenous fluids  - Provide specific nutrition/hydration education as appropriate  - Include patient/family/caregiver in decisions related to nutrition  Outcome: Progressing     Problem: Potential for Falls  Goal: Patient will remain free of falls  Description: INTERVENTIONS:  - Assess patient frequently for physical needs  -  Identify cognitive and physical deficits and behaviors that affect risk of falls    -  [place] fall precautions as indicated by assessment   - Educate patient/family on patient safety including physical limitations  - Instruct patient to call for assistance with activity based on assessment  - Modify environment to reduce risk of injury  - Consider OT/PT consult to assist with strengthening/mobility  Outcome: Progressing     Problem: CARDIOVASCULAR - ADULT  Goal: Maintains optimal cardiac output and hemodynamic stability  Description: INTERVENTIONS:  - Monitor I/O, vital signs and rhythm  - Monitor for S/S and trends of decreased cardiac output  - Administer and titrate ordered vasoactive medications to optimize hemodynamic stability  - Assess quality of pulses, skin color and temperature  - Assess for signs of decreased coronary artery perfusion  - Instruct patient to report change in severity of symptoms  Outcome: Progressing  Goal: Absence of cardiac dysrhythmias or at baseline rhythm  Description: INTERVENTIONS:  - Continuous cardiac monitoring, vital signs, obtain 12 lead EKG if ordered  - Administer antiarrhythmic and heart rate control medications as ordered  - Monitor electrolytes and administer replacement therapy as ordered  Outcome: Progressing     Problem: RESPIRATORY - ADULT  Goal: Achieves optimal ventilation and oxygenation  Description: INTERVENTIONS:  - Assess for changes in respiratory status  - Assess for changes in mentation and behavior  - Position to facilitate oxygenation and minimize respiratory effort  - Oxygen administered by appropriate delivery if ordered  - Initiate smoking cessation education as indicated  - Encourage broncho-pulmonary hygiene including cough, deep breathe, Incentive Spirometry  - Assess the need for suctioning and aspirate as needed  - Assess and instruct to report SOB or any respiratory difficulty  - Respiratory Therapy support as indicated  Outcome: Progressing     Problem: SAFETY ADULT  Goal: Patient will remain free of falls  Description: INTERVENTIONS:  - Assess patient frequently for physical needs  -  Identify cognitive and physical deficits and behaviors that affect risk of falls    -  [place] fall precautions as indicated by assessment   - Educate patient/family on patient safety including physical limitations  - Instruct patient to call for assistance with activity based on assessment  - Modify environment to reduce risk of injury  - Consider OT/PT consult to assist with strengthening/mobility  Outcome: Progressing  Goal: Maintain or return to baseline ADL function  Description: INTERVENTIONS:  -  Assess patient's ability to carry out ADLs; assess patient's baseline for ADL function and identify physical deficits which impact ability to perform ADLs (bathing, care of mouth/teeth, toileting, grooming, dressing, etc )  - Assess/evaluate cause of self-care deficits   - Assess range of motion  - Assess patient's mobility; develop plan if impaired  - Assess patient's need for assistive devices and provide as appropriate  - Encourage maximum independence but intervene and supervise when necessary  - Involve family in performance of ADLs  - Assess for home care needs following discharge   - Consider OT consult to assist with ADL evaluation and planning for discharge  - Provide patient education as appropriate  Outcome: Progressing  Goal: Maintain or return mobility status to optimal level  Description: INTERVENTIONS:  - Assess patient's baseline mobility status (ambulation, transfers, stairs, etc )    - Identify cognitive and physical deficits and behaviors that affect mobility  - Identify mobility aids required to assist with transfers and/or ambulation (gait belt, sit-to-stand, lift, walker, cane, etc )  - [place] fall precautions as indicated by assessment  - Record patient progress and toleration of activity level on Mobility SBAR; progress patient to next Phase/Stage  - Instruct patient to call for assistance with activity based on assessment  - Consider rehabilitation consult to assist with strengthening/weightbearing, etc   Outcome: Progressing     Problem: DISCHARGE PLANNING  Goal: Discharge to home or other facility with appropriate resources  Description: INTERVENTIONS:  - Identify barriers to discharge w/patient and caregiver  - Arrange for needed discharge resources and transportation as appropriate  - Identify discharge learning needs (meds, wound care, etc )  - Arrange for interpretive services to assist at discharge as needed  - Refer to Case Management Department for coordinating discharge planning if the patient needs post-hospital services based on physician/advanced practitioner order or complex needs related to functional status, cognitive ability, or social support system  Outcome: Progressing     Problem: PAIN - ADULT  Goal: Verbalizes/displays adequate comfort level or baseline comfort level  Description: Interventions:  - Encourage patient to monitor pain and request assistance  - Assess pain using appropriate pain scale  - Administer analgesics based on type and severity of pain and evaluate response  - Implement non-pharmacological measures as appropriate and evaluate response  - Consider cultural and social influences on pain and pain management  - Notify physician/advanced practitioner if interventions unsuccessful or patient reports new pain  Outcome: Progressing     Problem: Prexisting or High Potential for Compromised Skin Integrity  Goal: Skin integrity is maintained or improved  Description: INTERVENTIONS:  - Identify patients at risk for skin breakdown  - Assess and monitor skin integrity  - Assess and monitor nutrition and hydration status  - Monitor labs   - Assess for incontinence   - Turn and reposition patient  - Assist with mobility/ambulation  - Relieve pressure over bony prominences  - Avoid friction and shearing  - Provide appropriate hygiene as needed including keeping skin clean and dry  - Evaluate need for skin moisturizer/barrier cream  - Collaborate with interdisciplinary team   - Patient/family teaching  - Consider wound care consult   Outcome: Progressing

## 2021-01-13 NOTE — ASSESSMENT & PLAN NOTE
Wt Readings from Last 3 Encounters:   01/12/21 62 kg (136 lb 11 2 oz)   01/05/21 63 1 kg (139 lb 3 2 oz)   12/23/20 62 4 kg (137 lb 9 1 oz)     · Cardiology following   · Continue Lasix 40mg PO   · Patient on 3L o2  · CXR revealed RLL infiltrate  · Xopenex ordered  · Continue to monitor

## 2021-01-13 NOTE — PROGRESS NOTES
Progress Note - Cardiology   Mariah Crimes 79 y o  female MRN: [id number]  Unit/Bed#: S -01 Encounter: [id number]        Principal Problem:    Acute combined systolic and diastolic congestive heart failure (HCC)  Active Problems:    CAD (coronary artery disease)    Hyperlipidemia    Chest pain    Pulmonary edema cardiac cause (HCC)    Elevated troponin    Respiratory distress    Suprapubic pain          Assessment/Plan     1  Acute on chronic diastolic heart failure with pulmonary edema  CT of the chest reveals pulmonary interstitial edema at lung bases   Subtle ground-glass opacification lung bases throughout the lungs and a bronchovascular distribution suggest pulmonary alveolar edema  There is no PE  Diuretic-given IV diuretics pre cath  Resumed p o  Post catheterization yesterday      2  CAD prior CABG 2011- with closure of LIMA to LAD status post revascularization of the LAD and diagonal branch with PCI/drug-eluting stent 2011  Subsequent left heart catheterization December 2011 recurrent disease-prox LAD discrete 70% stenosis amenable to PCIl      Recently hospitalized with elevated troponin and chest pain concerning for NSTEMI 1/ pulmonary edema  Echo LVEF 45% ( previously 55) mild diffuse hypokinesis   Discussion with Interventional Cardiology, medical management advised   On discharge her nitrate, beta blocker, Ranexa, norvasc  have all been uptitrated     Proceeded with left heart catheterization this admission and LAMINE LAD/Lt main      3  Elevated troponin - possible small NSTEMI vs type 2 MI d/t pulmonary edema in setting of known severe CAD    EKG-sinus tachycardia/LBBB ( old)  Troponin 0 02, 1 05, 2 31, 2 36  recurrent CP few days ago with rising troponin  [facility]  1/11- S/P LAD/ Lt main LAMINE     3  ICMP-LVEF 45% (prior EF 55%)  BB- Toprol 50  ARB- Cozaar 50  Diuretic-Lasix 40 mg (this was her pre-hospital dose  Plan was 40 b i d  Post catheterization    Will increase)     Telemetry - NSR LBBB short runs NSVT  Increasing toprol to 75mg    4  HTN- fairly well controlled with few isolated elevated BP's on current medical management   Continue to monitor with diuresis    Toprol 50, Imdur 90  ARB- Cozaar 50mg      5  HLD - atorvastatin 20mg   Cholesterol 168/triglyceride 88/HDL 62/LDL 88     6  Bilateral renal artery stenosis- bilateral SUKH could be contributing to flash pulmonary edema  BP has been labile  May need further w/u       7  Epigastric pain/nausea  Noncontrast CT abdomen gallstone without surrounding inflammation  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 Contributing to symptomatology  Her epigastric pain has improved but still with nausea this morning  She now has LUQ tenderness to palpation    6  Aspiration PNA  Vomited a few days ago followed by increased work of breathing and hypoxia, leukocytosis and chest x-ray concerning for right lower lobe pneumonia  Started on Flagyl and Rocephin      Subjective/Objective   Chief Complaint/Subjective  Patient without chest pain or shortness of breath this morning  On 4 L of oxygen  I will carotid  She denies any abdominal pain until I palpate  She does complain of nausea          Vitals: /60 (BP Location: Left arm)   Pulse 95   Temp 97 7 °F (36 5 °C) (Oral)   Resp 20   Ht 5' (1 524 m)   Wt 62 kg (136 lb 11 2 oz)   SpO2 99%   BMI 26 70 kg/m²     Vitals:    01/11/21 0408 01/12/21 0555   Weight: 61 8 kg (136 lb 4 8 oz) 62 kg (136 lb 11 2 oz)     Orthostatic Blood Pressures      Most Recent Value   Blood Pressure  121/60 filed at 01/13/2021 0734   Patient Position - Orthostatic VS  Lying filed at 01/13/2021 0734            Intake/Output Summary (Last 24 hours) at 1/13/2021 0937  Last data filed at 1/13/2021 0901  Gross per 24 hour   Intake 30 ml   Output 500 ml   Net -470 ml       Invasive Devices     Peripheral Intravenous Line            Peripheral IV 01/10/21 Right;Ventral (anterior) Forearm 3 days    Peripheral IV 01/11/21 Right Antecubital 1 day                Current Facility-Administered Medications   Medication Dose Route Frequency    acetaminophen (TYLENOL) tablet 650 mg  650 mg Oral Q4H PRN    aluminum-magnesium hydroxide-simethicone (MYLANTA) oral suspension 30 mL  30 mL Oral Q4H PRN    aspirin (ECOTRIN LOW STRENGTH) EC tablet 81 mg  81 mg Oral Daily    atorvastatin (LIPITOR) tablet 20 mg  20 mg Oral Daily With Dinner    cefTRIAXone (ROCEPHIN) 1,000 mg in dextrose 5 % 50 mL IVPB  1,000 mg Intravenous Q24H    Diclofenac Sodium (VOLTAREN) 1 % topical gel 2 g  2 g Topical 4x Daily    fentanyl citrate (PF) 100 MCG/2ML 50 mcg  50 mcg Intravenous Once    furosemide (LASIX) tablet 40 mg  40 mg Oral Daily    heparin (porcine) subcutaneous injection 5,000 Units  5,000 Units Subcutaneous Q8H [address]    isosorbide mononitrate (IMDUR) 24 hr tablet 90 mg  90 mg Oral Daily    levalbuterol (XOPENEX) inhalation solution 1 25 mg  1 25 mg Nebulization Q6H PRN    loperamide (IMODIUM) capsule 2 mg  2 mg Oral TID PRN    losartan (COZAAR) tablet 50 mg  50 mg Oral Daily    metoprolol succinate (TOPROL-XL) 24 hr tablet 50 mg  50 mg Oral Daily    metroNIDAZOLE (FLAGYL) IVPB (premix) 500 mg 100 mL  500 mg Intravenous Q8H    morphine injection 1 mg  1 mg Intravenous Q4H PRN    morphine injection 1 mg  1 mg Intravenous Once    morphine injection 1 mg  1 mg Intravenous Once    nitroglycerin (NITROSTAT) SL tablet 0 4 mg  0 4 mg Sublingual Q5 Min PRN    ondansetron (ZOFRAN) injection 4 mg  4 mg Intravenous Once    ondansetron (ZOFRAN) injection 4 mg  4 mg Intravenous Q6H PRN    oxyCODONE (ROXICODONE) IR tablet 2 5 mg  2 5 mg Oral Q4H PRN    pantoprazole (PROTONIX) injection 40 mg  40 mg Intravenous Q12H [place]    polyethylene glycol (MIRALAX) packet 17 g  17 g Oral Daily    prasugrel (EFFIENT) tablet 10 mg  10 mg Oral Daily    ranolazine (RANEXA) 12 hr tablet 1,000 mg  1,000 mg Oral BID    senna (SENOKOT) tablet 8 6 mg  8 6 mg Oral HS    simethicone (MYLICON) chewable tablet 80 mg  80 mg Oral 4x Daily PRN    sodium chloride 0 9 % inhalation solution 3 mL  3 mL Nebulization Q6H PRN         Physical Exam: /60 (BP Location: Left arm)   Pulse 95   Temp 97 7 °F (36 5 °C) (Oral)   Resp 20   Ht 5' (1 524 m)   Wt 62 kg (136 lb 11 2 oz)   SpO2 99%   BMI 26 70 kg/m²     General Appearance:    Alert, cooperative, no distress, appears stated age   pale   Head:    Normocephalic, no scleral icterus                       Lungs:     Rales bases to auscultation bilaterally, respirations unlabored   Chest Wall:    No tenderness or deformity    Heart:    Regular rate and rhythm, S1 and S2 normal, no murmur, rub   or gallop   Abdomen:     Tender left upper quadrant   Extremities:   Extremities normal, atraumatic, no cyanosis or edema   Pulses:   2+ and symmetric all extremities   Skin:   Skin color pale, warm   Neurologic:   Alert and oriented to person place and time                 Lab Results:   Recent Results (from the past 72 hour(s))   ECG 12 lead    Collection Time: 01/11/21 12:00 AM   Result Value Ref Range    Ventricular Rate 74 BPM    Atrial Rate 74 BPM    MI Interval 152 ms    QRSD Interval 146 ms    QT Interval 454 ms    QTC Interval 503 ms    P Axis 66 degrees    QRS Axis 8 degrees    T Wave Axis 126 degrees   ECG 12 lead    Collection Time: 01/11/21  4:18 AM   Result Value Ref Range    Ventricular Rate 92 BPM    Atrial Rate 92 BPM    MI Interval 144 ms    QRSD Interval 148 ms    QT Interval 406 ms    QTC Interval 502 ms    P Axis 61 degrees    QRS Axis 14 degrees    T Wave Axis 166 degrees   Protime-INR    Collection Time: 01/11/21  4:29 AM   Result Value Ref Range    Protime 12 4 11 6 - 14 5 seconds    INR 0 91 0 84 - 1 19   Troponin I    Collection Time: 01/11/21 12:01 PM   Result Value Ref Range    Troponin I 3 29 (H) <=0 04 ng/mL   Magnesium    Collection Time: 01/11/21 12:08 PM   Result Value Ref Range    Magnesium 2 2 1 6 - 2 6 mg/dL CBC and Platelet    Collection Time: 01/11/21 12:08 PM   Result Value Ref Range    WBC 19 96 (H) 4 31 - 10 16 Thousand/uL    RBC 4 82 3 81 - 5 12 Million/uL    Hemoglobin 14 8 11 5 - 15 4 g/dL    Hematocrit 44 9 34 8 - 46 1 %    MCV 93 82 - 98 fL    MCH 30 7 26 8 - 34 3 pg    MCHC 33 0 31 4 - 37 4 g/dL    RDW 13 1 11 6 - 15 1 %    Platelets 612 922 - 941 Thousands/uL    MPV 10 6 8 9 - 12 7 fL   Comprehensive metabolic panel    Collection Time: 01/11/21 12:08 PM   Result Value Ref Range    Sodium 132 (L) 136 - 145 mmol/L    Potassium 3 4 (L) 3 5 - 5 3 mmol/L    Chloride 96 (L) 100 - 108 mmol/L    CO2 24 21 - 32 mmol/L    ANION GAP 12 4 - 13 mmol/L    BUN 32 (H) 5 - 25 mg/dL    Creatinine 1 35 (H) 0 60 - 1 30 mg/dL    Glucose 195 (H) 65 - 140 mg/dL    Calcium 9 5 8 3 - 10 1 mg/dL    AST 65 (H) 5 - 45 U/L    ALT 28 12 - 78 U/L    Alkaline Phosphatase 92 46 - 116 U/L    Total Protein 8 0 6 4 - 8 2 g/dL    Albumin 3 9 3 5 - 5 0 g/dL    Total Bilirubin 0 97 0 20 - 1 00 mg/dL    eGFR 37 ml/min/1 73sq m   APTT    Collection Time: 01/11/21 12:48 PM   Result Value Ref Range    PTT 21 (L) 23 - 37 seconds   Protime-INR    Collection Time: 01/11/21 12:48 PM   Result Value Ref Range    Protime 12 3 11 6 - 14 5 seconds    INR 0 91 0 84 - 1 19   Procalcitonin with AM Reflex    Collection Time: 01/11/21 12:48 PM   Result Value Ref Range    Procalcitonin <0 05 <=0 25 ng/ml   POCT activated clotting time    Collection Time: 01/11/21  2:32 PM   Result Value Ref Range    Activated Clotting Time, i-STAT 299 (H) 89 - 137 sec    Specimen Type VENOUS    Procalcitonin Reflex    Collection Time: 01/12/21  6:35 AM   Result Value Ref Range    Procalcitonin 0 71 (H) <=0 25 ng/ml   CBC and differential    Collection Time: 01/12/21  6:35 AM   Result Value Ref Range    WBC 23 12 (H) 4 31 - 10 16 Thousand/uL    RBC 4 83 3 81 - 5 12 Million/uL    Hemoglobin 14 7 11 5 - 15 4 g/dL    Hematocrit 45 1 34 8 - 46 1 %    MCV 93 82 - 98 fL    MCH 30 4 26 8 - 34 3 pg    MCHC 32 6 31 4 - 37 4 g/dL    RDW 13 6 11 6 - 15 1 %    MPV 11 1 8 9 - 12 7 fL    Platelets 718 199 - 883 Thousands/uL    nRBC 0 /100 WBCs    Neutrophils Relative 87 (H) 43 - 75 %    Immat GRANS % 1 0 - 2 %    Lymphocytes Relative 5 (L) 14 - 44 %    Monocytes Relative 7 4 - 12 %    Eosinophils Relative 0 0 - 6 %    Basophils Relative 0 0 - 1 %    Neutrophils Absolute 20 20 (H) 1 85 - 7 62 Thousands/µL    Immature Grans Absolute 0 16 0 00 - 0 20 Thousand/uL    Lymphocytes Absolute 1 11 0 60 - 4 47 Thousands/µL    Monocytes Absolute 1 60 (H) 0 17 - 1 22 Thousand/µL    Eosinophils Absolute 0 00 0 00 - 0 61 Thousand/µL    Basophils Absolute 0 05 0 00 - 0 10 Thousands/µL   Comprehensive metabolic panel    Collection Time: 01/12/21  6:35 AM   Result Value Ref Range    Sodium 132 (L) 136 - 145 mmol/L    Potassium 4 6 3 5 - 5 3 mmol/L    Chloride 100 100 - 108 mmol/L    CO2 23 21 - 32 mmol/L    ANION GAP 9 4 - 13 mmol/L    BUN 30 (H) 5 - 25 mg/dL    Creatinine 1 09 0 60 - 1 30 mg/dL    Glucose 156 (H) 65 - 140 mg/dL    Calcium 9 1 8 3 - 10 1 mg/dL    Corrected Calcium 9 7 8 3 - 10 1 mg/dL     (H) 5 - 45 U/L    ALT 55 12 - 78 U/L    Alkaline Phosphatase 83 46 - 116 U/L    Total Protein 6 8 6 4 - 8 2 g/dL    Albumin 3 2 (L) 3 5 - 5 0 g/dL    Total Bilirubin 0 84 0 20 - 1 00 mg/dL    eGFR 48 ml/min/1 73sq m   Lipase    Collection Time: 01/12/21  6:35 AM   Result Value Ref Range    Lipase 50 (L) 73 - 393 u/L   Fingerstick Glucose (POCT)    Collection Time: 01/13/21  1:23 AM   Result Value Ref Range    POC Glucose 129 65 - 140 mg/dl   Comprehensive metabolic panel    Collection Time: 01/13/21  6:47 AM   Result Value Ref Range    Sodium 131 (L) 136 - 145 mmol/L    Potassium 3 8 3 5 - 5 3 mmol/L    Chloride 99 (L) 100 - 108 mmol/L    CO2 23 21 - 32 mmol/L    ANION GAP 9 4 - 13 mmol/L    BUN 23 5 - 25 mg/dL    Creatinine 1 04 0 60 - 1 30 mg/dL    Glucose 130 65 - 140 mg/dL    Calcium 8 6 8 3 - 10 1 mg/dL Corrected Calcium 9 7 8 3 - 10 1 mg/dL     (H) 5 - 45 U/L    ALT 39 12 - 78 U/L    Alkaline Phosphatase 68 46 - 116 U/L    Total Protein 6 1 (L) 6 4 - 8 2 g/dL    Albumin 2 6 (L) 3 5 - 5 0 g/dL    Total Bilirubin 0 83 0 20 - 1 00 mg/dL    eGFR 51 ml/min/1 73sq m     Imaging: I have personally reviewed pertinent reports  Tele- NSR LBBB NSVT      Counseling / Coordination of Care  Total time spent today 30 minutes  Greater than 50% of total time was spent with the patient and / or family counseling and / or coordination of care

## 2021-01-13 NOTE — ASSESSMENT & PLAN NOTE
Improved  Initial chest pain located in the right radiating to both arms  Pain was described as squeezing, tearing and rated 10/10  Alleviating factors: Nitroglycerin and rest  Worsened by: deep inspiration, emotional stress and walking    Recent Labs     01/11/21  1201   TROPONINI 3 29*        Plan:   - Cardiac cath completed 1/11/2021  Stent placed left main   Signs of mesenteric ischemia noted  - Continue heparin and lasix as per cardiology

## 2021-01-13 NOTE — ASSESSMENT & PLAN NOTE
Patient requiring 3L o2 via NC  States she had vomited 1/11/2021 which may have caused an aspiration  WBC count elevated 12,000 today, trending down  CXR concerning for RLL pneumonia        - Continue flagyl and rocephin  - Monitor vitals

## 2021-01-13 NOTE — PHYSICAL THERAPY NOTE
PHYSICAL THERAPY TREATMENT NOTE  NAME:  Sharan Chatman  DATE: 01/13/21    Length Of Stay: 6    TREATMENT:      01/13/21 1515   PT Last Visit   PT Visit Date 01/13/21   Note Type   Note type Evaluation   Cancel Reasons Medical status  (rapid response called recently, pt hypotensive   Will follow )       Tim Alcantar, PT, DPT

## 2021-01-13 NOTE — ASSESSMENT & PLAN NOTE
Wt Readings from Last 3 Encounters:   01/12/21 62 kg (136 lb 11 2 oz)   01/05/21 63 1 kg (139 lb 3 2 oz)   12/23/20 62 4 kg (137 lb 9 1 oz)       Presented with increased shortness of breath, dyspnea on exertion and lower extremity edema  04/8 ECHO: Systolic function was mildly reduced, Ejection fraction was estimated to be 45 %  There was mild diffuse hypokinesis (grade 1 diastolic dysfunction)      Lab Results   Component Value Date    NTBNP 1,254 (H) 01/07/2021    NTBNP 958 (H) 12/02/2020     - lasix restarted  - Continue to monitor urine output  - Daily weights  - Cardiology following

## 2021-01-13 NOTE — PLAN OF CARE
Problem: Nutrition/Hydration-ADULT  Goal: Nutrient/Hydration intake appropriate for improving, restoring or maintaining nutritional needs  Description: Monitor and assess patient's nutrition/hydration status for malnutrition  Collaborate with interdisciplinary team and initiate plan and interventions as ordered  Monitor patient's weight and dietary intake as ordered or per policy  Utilize nutrition screening tool and intervene as necessary  Determine patient's food preferences and provide high-protein, high-caloric foods as appropriate  INTERVENTIONS:  - Monitor oral intake, urinary output, labs, and treatment plans  - Assess nutrition and hydration status and recommend course of action  - Evaluate amount of meals eaten  - Assist patient with eating if necessary   - Allow adequate time for meals  - Recommend/ encourage appropriate diets, oral nutritional supplements, and vitamin/mineral supplements  - Order, calculate, and assess calorie counts as needed  - Recommend, monitor, and adjust tube feedings and TPN/PPN based on assessed needs  - Assess need for intravenous fluids  - Provide specific nutrition/hydration education as appropriate  - Include patient/family/caregiver in decisions related to nutrition  Outcome: Progressing     Problem: Potential for Falls  Goal: Patient will remain free of falls  Description: INTERVENTIONS:  - Assess patient frequently for physical needs  -  Identify cognitive and physical deficits and behaviors that affect risk of falls    -  [place] fall precautions as indicated by assessment   - Educate patient/family on patient safety including physical limitations  - Instruct patient to call for assistance with activity based on assessment  - Modify environment to reduce risk of injury  - Consider OT/PT consult to assist with strengthening/mobility  Outcome: Progressing     Problem: CARDIOVASCULAR - ADULT  Goal: Maintains optimal cardiac output and hemodynamic stability  Description: INTERVENTIONS:  - Monitor I/O, vital signs and rhythm  - Monitor for S/S and trends of decreased cardiac output  - Administer and titrate ordered vasoactive medications to optimize hemodynamic stability  - Assess quality of pulses, skin color and temperature  - Assess for signs of decreased coronary artery perfusion  - Instruct patient to report change in severity of symptoms  Outcome: Progressing  Goal: Absence of cardiac dysrhythmias or at baseline rhythm  Description: INTERVENTIONS:  - Continuous cardiac monitoring, vital signs, obtain 12 lead EKG if ordered  - Administer antiarrhythmic and heart rate control medications as ordered  - Monitor electrolytes and administer replacement therapy as ordered  Outcome: Progressing     Problem: RESPIRATORY - ADULT  Goal: Achieves optimal ventilation and oxygenation  Description: INTERVENTIONS:  - Assess for changes in respiratory status  - Assess for changes in mentation and behavior  - Position to facilitate oxygenation and minimize respiratory effort  - Oxygen administered by appropriate delivery if ordered  - Initiate smoking cessation education as indicated  - Encourage broncho-pulmonary hygiene including cough, deep breathe, Incentive Spirometry  - Assess the need for suctioning and aspirate as needed  - Assess and instruct to report SOB or any respiratory difficulty  - Respiratory Therapy support as indicated  Outcome: Progressing     Problem: SAFETY ADULT  Goal: Patient will remain free of falls  Description: INTERVENTIONS:  - Assess patient frequently for physical needs  -  Identify cognitive and physical deficits and behaviors that affect risk of falls    -  [place] fall precautions as indicated by assessment   - Educate patient/family on patient safety including physical limitations  - Instruct patient to call for assistance with activity based on assessment  - Modify environment to reduce risk of injury  - Consider OT/PT consult to assist with strengthening/mobility  Outcome: Progressing  Goal: Maintain or return to baseline ADL function  Description: INTERVENTIONS:  -  Assess patient's ability to carry out ADLs; assess patient's baseline for ADL function and identify physical deficits which impact ability to perform ADLs (bathing, care of mouth/teeth, toileting, grooming, dressing, etc )  - Assess/evaluate cause of self-care deficits   - Assess range of motion  - Assess patient's mobility; develop plan if impaired  - Assess patient's need for assistive devices and provide as appropriate  - Encourage maximum independence but intervene and supervise when necessary  - Involve family in performance of ADLs  - Assess for home care needs following discharge   - Consider OT consult to assist with ADL evaluation and planning for discharge  - Provide patient education as appropriate  Outcome: Progressing  Goal: Maintain or return mobility status to optimal level  Description: INTERVENTIONS:  - Assess patient's baseline mobility status (ambulation, transfers, stairs, etc )    - Identify cognitive and physical deficits and behaviors that affect mobility  - Identify mobility aids required to assist with transfers and/or ambulation (gait belt, sit-to-stand, lift, walker, cane, etc )  - [place] fall precautions as indicated by assessment  - Record patient progress and toleration of activity level on Mobility SBAR; progress patient to next Phase/Stage  - Instruct patient to call for assistance with activity based on assessment  - Consider rehabilitation consult to assist with strengthening/weightbearing, etc   Outcome: Progressing     Problem: DISCHARGE PLANNING  Goal: Discharge to home or other facility with appropriate resources  Description: INTERVENTIONS:  - Identify barriers to discharge w/patient and caregiver  - Arrange for needed discharge resources and transportation as appropriate  - Identify discharge learning needs (meds, wound care, etc )  - Arrange for interpretive services to assist at discharge as needed  - Refer to Case Management Department for coordinating discharge planning if the patient needs post-hospital services based on physician/advanced practitioner order or complex needs related to functional status, cognitive ability, or social support system  Outcome: Progressing     Problem: PAIN - ADULT  Goal: Verbalizes/displays adequate comfort level or baseline comfort level  Description: Interventions:  - Encourage patient to monitor pain and request assistance  - Assess pain using appropriate pain scale  - Administer analgesics based on type and severity of pain and evaluate response  - Implement non-pharmacological measures as appropriate and evaluate response  - Consider cultural and social influences on pain and pain management  - Notify physician/advanced practitioner if interventions unsuccessful or patient reports new pain  Outcome: Progressing     Problem: Prexisting or High Potential for Compromised Skin Integrity  Goal: Skin integrity is maintained or improved  Description: INTERVENTIONS:  - Identify patients at risk for skin breakdown  - Assess and monitor skin integrity  - Assess and monitor nutrition and hydration status  - Monitor labs   - Assess for incontinence   - Turn and reposition patient  - Assist with mobility/ambulation  - Relieve pressure over bony prominences  - Avoid friction and shearing  - Provide appropriate hygiene as needed including keeping skin clean and dry  - Evaluate need for skin moisturizer/barrier cream  - Collaborate with interdisciplinary team   - Patient/family teaching  - Consider wound care consult   Outcome: Progressing

## 2021-01-13 NOTE — ASSESSMENT & PLAN NOTE
Pain in suprapubic region on palpation during physical exam  Bladder scan revealed approximately 400cc urine in bladder     - Patient encouraged to use commode, only small amount of urine expelled  - will continue to encourage commode use  - Straight cath as needed

## 2021-01-14 LAB
BACTERIA UR QL AUTO: ABNORMAL /HPF
BASOPHILS # BLD AUTO: 0.03 THOUSANDS/ΜL (ref 0–0.1)
BASOPHILS NFR BLD AUTO: 0 % (ref 0–1)
BILIRUB UR QL STRIP: NEGATIVE
CLARITY UR: CLEAR
COLOR UR: YELLOW
EOSINOPHIL # BLD AUTO: 0.06 THOUSAND/ΜL (ref 0–0.61)
EOSINOPHIL NFR BLD AUTO: 1 % (ref 0–6)
ERYTHROCYTE [DISTWIDTH] IN BLOOD BY AUTOMATED COUNT: 13.9 % (ref 11.6–15.1)
GLUCOSE UR STRIP-MCNC: NEGATIVE MG/DL
HCT VFR BLD AUTO: 34.9 % (ref 34.8–46.1)
HGB BLD-MCNC: 11.4 G/DL (ref 11.5–15.4)
HGB UR QL STRIP.AUTO: NEGATIVE
IMM GRANULOCYTES # BLD AUTO: 0.2 THOUSAND/UL (ref 0–0.2)
IMM GRANULOCYTES NFR BLD AUTO: 2 % (ref 0–2)
KETONES UR STRIP-MCNC: NEGATIVE MG/DL
LACTATE SERPL-SCNC: 1 MMOL/L (ref 0.5–2)
LEUKOCYTE ESTERASE UR QL STRIP: NEGATIVE
LYMPHOCYTES # BLD AUTO: 0.82 THOUSANDS/ΜL (ref 0.6–4.47)
LYMPHOCYTES NFR BLD AUTO: 8 % (ref 14–44)
MCH RBC QN AUTO: 31.1 PG (ref 26.8–34.3)
MCHC RBC AUTO-ENTMCNC: 32.7 G/DL (ref 31.4–37.4)
MCV RBC AUTO: 95 FL (ref 82–98)
MONOCYTES # BLD AUTO: 0.79 THOUSAND/ΜL (ref 0.17–1.22)
MONOCYTES NFR BLD AUTO: 7 % (ref 4–12)
NEUTROPHILS # BLD AUTO: 9.05 THOUSANDS/ΜL (ref 1.85–7.62)
NEUTS SEG NFR BLD AUTO: 82 % (ref 43–75)
NITRITE UR QL STRIP: POSITIVE
NON-SQ EPI CELLS URNS QL MICRO: ABNORMAL /HPF
NRBC BLD AUTO-RTO: 0 /100 WBCS
PH UR STRIP.AUTO: 5.5 [PH]
PLATELET # BLD AUTO: 218 THOUSANDS/UL (ref 149–390)
PMV BLD AUTO: 11.4 FL (ref 8.9–12.7)
PROCALCITONIN SERPL-MCNC: 0.52 NG/ML
PROT UR STRIP-MCNC: NEGATIVE MG/DL
RBC # BLD AUTO: 3.67 MILLION/UL (ref 3.81–5.12)
RBC #/AREA URNS AUTO: ABNORMAL /HPF
SP GR UR STRIP.AUTO: 1.01 (ref 1–1.03)
UROBILINOGEN UR QL STRIP.AUTO: 0.2 E.U./DL
WBC # BLD AUTO: 10.95 THOUSAND/UL (ref 4.31–10.16)
WBC #/AREA URNS AUTO: ABNORMAL /HPF

## 2021-01-14 PROCEDURE — 99232 SBSQ HOSP IP/OBS MODERATE 35: CPT | Performed by: INTERNAL MEDICINE

## 2021-01-14 PROCEDURE — 85025 COMPLETE CBC W/AUTO DIFF WBC: CPT | Performed by: FAMILY MEDICINE

## 2021-01-14 PROCEDURE — C9113 INJ PANTOPRAZOLE SODIUM, VIA: HCPCS | Performed by: FAMILY MEDICINE

## 2021-01-14 PROCEDURE — 93005 ELECTROCARDIOGRAM TRACING: CPT

## 2021-01-14 PROCEDURE — 87086 URINE CULTURE/COLONY COUNT: CPT | Performed by: NURSE PRACTITIONER

## 2021-01-14 PROCEDURE — 83605 ASSAY OF LACTIC ACID: CPT | Performed by: NURSE PRACTITIONER

## 2021-01-14 PROCEDURE — 84145 PROCALCITONIN (PCT): CPT | Performed by: INTERNAL MEDICINE

## 2021-01-14 PROCEDURE — 81001 URINALYSIS AUTO W/SCOPE: CPT | Performed by: NURSE PRACTITIONER

## 2021-01-14 RX ORDER — FUROSEMIDE 40 MG/1
40 TABLET ORAL DAILY
Status: DISCONTINUED | OUTPATIENT
Start: 2021-01-15 | End: 2021-01-17 | Stop reason: HOSPADM

## 2021-01-14 RX ORDER — LOSARTAN POTASSIUM 25 MG/1
25 TABLET ORAL DAILY
Status: DISCONTINUED | OUTPATIENT
Start: 2021-01-14 | End: 2021-01-17 | Stop reason: HOSPADM

## 2021-01-14 RX ORDER — POLYETHYLENE GLYCOL 3350 17 G/17G
17 POWDER, FOR SOLUTION ORAL DAILY PRN
Status: DISCONTINUED | OUTPATIENT
Start: 2021-01-14 | End: 2021-01-17 | Stop reason: HOSPADM

## 2021-01-14 RX ORDER — SENNOSIDES 8.6 MG
8.6 TABLET ORAL
Status: DISCONTINUED | OUTPATIENT
Start: 2021-01-14 | End: 2021-01-17 | Stop reason: HOSPADM

## 2021-01-14 RX ORDER — METOPROLOL SUCCINATE 50 MG/1
50 TABLET, EXTENDED RELEASE ORAL DAILY
Status: DISCONTINUED | OUTPATIENT
Start: 2021-01-14 | End: 2021-01-17 | Stop reason: HOSPADM

## 2021-01-14 RX ORDER — SACCHAROMYCES BOULARDII 250 MG
250 CAPSULE ORAL 2 TIMES DAILY
Status: DISCONTINUED | OUTPATIENT
Start: 2021-01-14 | End: 2021-01-17 | Stop reason: HOSPADM

## 2021-01-14 RX ADMIN — PRASUGREL 10 MG: 10 TABLET, FILM COATED ORAL at 08:11

## 2021-01-14 RX ADMIN — METOPROLOL SUCCINATE 50 MG: 50 TABLET, EXTENDED RELEASE ORAL at 09:45

## 2021-01-14 RX ADMIN — DICLOFENAC SODIUM 2 G: 10 GEL TOPICAL at 08:12

## 2021-01-14 RX ADMIN — Medication 125 MG: at 21:25

## 2021-01-14 RX ADMIN — Medication 250 MG: at 09:45

## 2021-01-14 RX ADMIN — PANTOPRAZOLE SODIUM 40 MG: 40 INJECTION, POWDER, FOR SOLUTION INTRAVENOUS at 21:25

## 2021-01-14 RX ADMIN — HEPARIN SODIUM 5000 UNITS: 5000 INJECTION INTRAVENOUS; SUBCUTANEOUS at 05:39

## 2021-01-14 RX ADMIN — LOSARTAN POTASSIUM 25 MG: 25 TABLET, FILM COATED ORAL at 09:28

## 2021-01-14 RX ADMIN — METRONIDAZOLE 500 MG: 500 INJECTION, SOLUTION INTRAVENOUS at 15:31

## 2021-01-14 RX ADMIN — METRONIDAZOLE 500 MG: 500 INJECTION, SOLUTION INTRAVENOUS at 00:02

## 2021-01-14 RX ADMIN — Medication 125 MG: at 10:36

## 2021-01-14 RX ADMIN — Medication 250 MG: at 17:20

## 2021-01-14 RX ADMIN — RANOLAZINE 1000 MG: 500 TABLET, FILM COATED, EXTENDED RELEASE ORAL at 17:18

## 2021-01-14 RX ADMIN — METRONIDAZOLE 500 MG: 500 INJECTION, SOLUTION INTRAVENOUS at 22:13

## 2021-01-14 RX ADMIN — Medication 125 MG: at 15:31

## 2021-01-14 RX ADMIN — ASPIRIN 81 MG: 81 TABLET, COATED ORAL at 08:11

## 2021-01-14 RX ADMIN — PANTOPRAZOLE SODIUM 40 MG: 40 INJECTION, POWDER, FOR SOLUTION INTRAVENOUS at 08:11

## 2021-01-14 RX ADMIN — METRONIDAZOLE 500 MG: 500 INJECTION, SOLUTION INTRAVENOUS at 06:00

## 2021-01-14 RX ADMIN — ATORVASTATIN CALCIUM 20 MG: 20 TABLET, FILM COATED ORAL at 15:31

## 2021-01-14 RX ADMIN — HEPARIN SODIUM 5000 UNITS: 5000 INJECTION INTRAVENOUS; SUBCUTANEOUS at 14:21

## 2021-01-14 RX ADMIN — HEPARIN SODIUM 5000 UNITS: 5000 INJECTION INTRAVENOUS; SUBCUTANEOUS at 21:25

## 2021-01-14 RX ADMIN — RANOLAZINE 1000 MG: 500 TABLET, FILM COATED, EXTENDED RELEASE ORAL at 08:11

## 2021-01-14 RX ADMIN — CEFTRIAXONE SODIUM 1000 MG: 10 INJECTION, POWDER, FOR SOLUTION INTRAVENOUS at 14:21

## 2021-01-14 NOTE — ASSESSMENT & PLAN NOTE
Patient requiring 1L o2 via NC  States she had vomited 1/11/2021 which may have caused an aspiration  Cbc pending today   CXR concerning for RLL pneumonia but is improving      - Continue flagyl and rocephin  - Monitor vitals

## 2021-01-14 NOTE — ASSESSMENT & PLAN NOTE
Improved  Initial chest pain located in the right radiating to both arms  Pain was described as squeezing, tearing and rated 10/10  Alleviating factors: Nitroglycerin and rest  Worsened by: deep inspiration, emotional stress and walking    Recent Labs     01/11/21  1201   TROPONINI 3 29*        Plan:   - Cardiac cath completed 1/11/2021  Stent placed left main   Signs of mesenteric ischemia noted  - Continue heparin and lasix as per cardiology No

## 2021-01-14 NOTE — ASSESSMENT & PLAN NOTE
Wt Readings from Last 3 Encounters:   01/14/21 65 3 kg (143 lb 15 4 oz)   01/05/21 63 1 kg (139 lb 3 2 oz)   12/23/20 62 4 kg (137 lb 9 1 oz)     · Cardiology following   · Continue Lasix 40mg PO   · Patient on 1L o2  · CXR revealed RLL infiltrate which is now improving  · Xopenex ordered PRN  · Continue to monitor

## 2021-01-14 NOTE — PROGRESS NOTES
Progress Note - Mariah Crimes 1940, 79 y o  female MRN: [id number]    Unit/Bed#: S -01 Encounter: [id number]    Primary Care Provider: Ralf Singh MD   Date and time admitted to hospital: 1/7/2021  7:41 PM        * Acute combined systolic and diastolic congestive heart failure ([address] )  Assessment & Plan  Wt Readings from Last 3 Encounters:   01/14/21 65 3 kg (143 lb 15 4 oz)   01/05/21 63 1 kg (139 lb 3 2 oz)   12/23/20 62 4 kg (137 lb 9 1 oz)       Presented with increased shortness of breath, dyspnea on exertion and lower extremity edema  08/0 ECHO: Systolic function was mildly reduced, Ejection fraction was estimated to be 45 %  There was mild diffuse hypokinesis (grade 1 diastolic dysfunction)  Lab Results   Component Value Date    NTBNP 1,254 (H) 01/07/2021    NTBNP 958 (H) 12/02/2020     - continue lasix with hold parameters  - Continue to monitor urine output  - Daily weights  - Cardiology following        Suprapubic pain  Assessment & Plan  Pain in suprapubic region on palpation during physical exam  Bladder scan reveals retention with the need for straight cath  Some pain diffusely  CT scan 1/13/2021 revealed colitis  - Patient encouraged to use commode, only small amount of urine expelled  - Straight cath as needed  - May require dominguez  - pro elayne and urine culture pending    Respiratory distress  Assessment & Plan  Patient requiring 1L o2 via NC  States she had vomited 1/11/2021 which may have caused an aspiration  Cbc pending today   CXR concerning for RLL pneumonia but is improving      - Continue flagyl and rocephin  - Monitor vitals      Elevated troponin  Assessment & Plan  Latest troponin 3 29 (1/11/2021) after patient stated she had abdominal/chest pain    - Cardiology on board  - Cardiac cath completed, stent placed Left main  - heparin restarted    Pulmonary edema cardiac cause [facility])  Assessment & Plan  Wt Readings from Last 3 Encounters:   01/14/21 65 3 kg (143 lb 15 4 oz)   21 63 1 kg (139 lb 3 2 oz)   20 62 4 kg (137 lb 9 1 oz)     · Cardiology following   · Continue Lasix 40mg PO   · Patient on 1L o2  · CXR revealed RLL infiltrate which is now improving  · Xopenex ordered PRN  · Continue to monitor    Chest pain  Assessment & Plan  Improved  Initial chest pain located in the right radiating to both arms  Pain was described as squeezing, tearing and rated 10/10  Alleviating factors: Nitroglycerin and rest  Worsened by: deep inspiration, emotional stress and walking    Recent Labs     21  1201   TROPONINI 3 29*        Plan:   - Cardiac cath completed 2021  Stent placed left main  Signs of mesenteric ischemia noted  - Continue heparin and lasix as per cardiology    Hyperlipidemia  Assessment & Plan  Continue Lipitor    CAD (coronary artery disease)  Assessment & Plan  · Continue Lipitor  · Isosorbide mononitrate 90 mg daily  · Metoprolol 24 hour tablet increased to 75 mg daily as per cardiology  · Effient 10 mg every other day, this was confirmed with prescription  · Ranexa 1000 mg b i d         VTE Pharmacologic Prophylaxis:   Pharmacologic: Heparin  Mechanical VTE Prophylaxis in Place: Yes    Discussions with Specialists or Other Care Team Provider: Cardiology    Education and Discussions with Family / Patient: Patient    Current Length of Stay: 7 day(s)    Current Patient Status: Inpatient     Discharge Plan / Estimated Discharge Date: TBD    Code Status: Level 1 - Full Code      Subjective:   Patient was seen sitting in her chair comfortably  She states that she is feeling well today  Nursing staff reports a great improvement in patients mentation  She is alert and oriented x3  She has no new complaints today      Objective:     Vitals:   Temp (24hrs), Av 2 °F (36 8 °C), Min:97 8 °F (36 6 °C), Max:99 °F (37 2 °C)    Temp:  [97 8 °F (36 6 °C)-99 °F (37 2 °C)] 98 °F (36 7 °C)  HR:  [77-83] 80  Resp:  [16-22] 18  BP: ()/(40-82) 136/82  SpO2:  [93 %-100 %] 93 %  Body mass index is 28 12 kg/m²  Input and Output Summary (last 24 hours): Intake/Output Summary (Last 24 hours) at 1/14/2021 1139  Last data filed at 1/14/2021 0941  Gross per 24 hour   Intake 120 ml   Output 528 ml   Net -408 ml       Physical Exam:     Physical Exam  Constitutional:       General: She is not in acute distress  Appearance: She is well-developed  She is not diaphoretic  HENT:      Head: Normocephalic and atraumatic  Eyes:      General: No scleral icterus  Right eye: No discharge  Left eye: No discharge  Conjunctiva/sclera: Conjunctivae normal    Cardiovascular:      Rate and Rhythm: Normal rate and regular rhythm  Heart sounds: Normal heart sounds  No murmur  Pulmonary:      Effort: No respiratory distress  Breath sounds: No wheezing  Abdominal:      Palpations: Abdomen is soft  Tenderness: There is abdominal tenderness  Comments: Suprapubic tenderness     Musculoskeletal: Normal range of motion  General: No tenderness  Lymphadenopathy:      Cervical: No cervical adenopathy  Skin:     General: Skin is warm and dry  Coloration: Skin is not pale  Findings: No erythema  Neurological:      Mental Status: She is alert  Additional Data:     Labs:    Results from last 7 days   Lab Units 01/13/21  1518   WBC Thousand/uL 12 20*   HEMOGLOBIN g/dL 10 4*   HEMATOCRIT % 32 5*   PLATELETS Thousands/uL 178   NEUTROS PCT % 83*   LYMPHS PCT % 8*   MONOS PCT % 8   EOS PCT % 0     Results from last 7 days   Lab Units 01/13/21  0647   POTASSIUM mmol/L 3 8   CHLORIDE mmol/L 99*   CO2 mmol/L 23   BUN mg/dL 23   CREATININE mg/dL 1 04   CALCIUM mg/dL 8 6   ALK PHOS U/L 68   ALT U/L 39   AST U/L 120*     Results from last 7 days   Lab Units 01/11/21  1248   INR  0 91       * I Have Reviewed All Lab Data Listed Above  * Additional Pertinent Lab Tests Reviewed:  Jason 65 Admission Reviewed    Imaging:    Imaging Reports Reviewed Today Include: CT abdomen pelvis  Imaging Personally Reviewed by Myself Includes:  CT abdomen pelvis    Recent Cultures (last 7 days):     Results from last 7 days   Lab Units 01/13/21  1518 01/13/21  1517   BLOOD CULTURE  Received in Microbiology Lab  Culture in Progress  Received in Microbiology Lab  Culture in Progress         Last 24 Hours Medication List:   Current Facility-Administered Medications   Medication Dose Route Frequency Provider Last Rate    acetaminophen  650 mg Oral Q4H PRN Ani Vance MD      aluminum-magnesium hydroxide-simethicone  30 mL Oral Q4H PRN KATHY Renteria      aspirin  81 mg Oral Daily Sheron Joceline boosKATHY      atorvastatin  20 mg Oral Daily With Michael Zamorano MD      cefTRIAXone  1,000 mg Intravenous Q24H Baron Fela MD 1,000 mg (01/13/21 1310)    Diclofenac Sodium  2 g Topical 4x Daily Ani Vance MD      fentanyl citrate (PF)  50 mcg Intravenous Once Cesar Mao PA-C      [START ON 1/15/2021] furosemide  40 mg Oral Daily KATHY Renteria      heparin (porcine)  5,000 Units Subcutaneous Q8H [address]      levalbuterol  1 25 mg Nebulization Q6H PRN Suzanne Ken DO      loperamide  2 mg Oral TID PRN Laura Lennon MD      losartan  25 mg Oral Daily KATHY Renteria      metoprolol succinate  50 mg Oral Daily KATHY Renteria      metroNIDAZOLE  500 mg Intravenous Q8H Baron Fela  mg (01/14/21 0600)    morphine injection  1 mg Intravenous Q4H PRN Ani Vance MD      morphine injection  1 mg Intravenous Once KATHY Renteria      morphine injection  1 mg Intravenous Once KATHY Renteria      nitroglycerin  0 4 mg Sublingual Q5 Min PRN Ani Vance MD      ondansetron  4 mg Intravenous Once Cesar Mao PA-C      ondansetron  4 mg Intravenous Q6H PRN MD Buddy Bonilla oxyCODONE  2 5 mg Oral Q4H PRN Nico Lemos MD      pantoprazole  40 mg Intravenous Q12H [address] Rohan Osuna MD      polyethylene glycol  17 g Oral Daily PRN Rajeev Cornell MD      prasugrel  10 mg Oral Daily KATHY Thapa      ranolazine  1,000 mg Oral BID Nico Lemos MD      saccharomyces boulardii  250 mg Oral BID Rajeev Cornell MD      senna  8 6 mg Oral HS PRN Rajeev Cornell MD      simethicone  80 mg Oral 4x Daily PRN Nico Lemos MD      sodium chloride  3 mL Nebulization Q6H PRN Josiah Pitt DO      vancomycin  125 mg Oral Q6H Rui Reilly MD          Today, Patient Was Seen By: Rohan Osuna MD    ** Please Note: This note has been constructed using a voice recognition system   **

## 2021-01-14 NOTE — PLAN OF CARE
Problem: Nutrition/Hydration-ADULT  Goal: Nutrient/Hydration intake appropriate for improving, restoring or maintaining nutritional needs  Description: Monitor and assess patient's nutrition/hydration status for malnutrition  Collaborate with interdisciplinary team and initiate plan and interventions as ordered  Monitor patient's weight and dietary intake as ordered or per policy  Utilize nutrition screening tool and intervene as necessary  Determine patient's food preferences and provide high-protein, high-caloric foods as appropriate  INTERVENTIONS:  - Monitor oral intake, urinary output, labs, and treatment plans  - Assess nutrition and hydration status and recommend course of action  - Evaluate amount of meals eaten  - Assist patient with eating if necessary   - Allow adequate time for meals  - Recommend/ encourage appropriate diets, oral nutritional supplements, and vitamin/mineral supplements  - Order, calculate, and assess calorie counts as needed  - Recommend, monitor, and adjust tube feedings and TPN/PPN based on assessed needs  - Assess need for intravenous fluids  - Provide specific nutrition/hydration education as appropriate  - Include patient/family/caregiver in decisions related to nutrition  Outcome: Progressing     Problem: Potential for Falls  Goal: Patient will remain free of falls  Description: INTERVENTIONS:  - Assess patient frequently for physical needs  -  Identify cognitive and physical deficits and behaviors that affect risk of falls    -  [place] fall precautions as indicated by assessment   - Educate patient/family on patient safety including physical limitations  - Instruct patient to call for assistance with activity based on assessment  - Modify environment to reduce risk of injury  - Consider OT/PT consult to assist with strengthening/mobility  Outcome: Progressing     Problem: CARDIOVASCULAR - ADULT  Goal: Maintains optimal cardiac output and hemodynamic stability  Description: INTERVENTIONS:  - Monitor I/O, vital signs and rhythm  - Monitor for S/S and trends of decreased cardiac output  - Administer and titrate ordered vasoactive medications to optimize hemodynamic stability  - Assess quality of pulses, skin color and temperature  - Assess for signs of decreased coronary artery perfusion  - Instruct patient to report change in severity of symptoms  Outcome: Progressing     Problem: RESPIRATORY - ADULT  Goal: Achieves optimal ventilation and oxygenation  Description: INTERVENTIONS:  - Assess for changes in respiratory status  - Assess for changes in mentation and behavior  - Position to facilitate oxygenation and minimize respiratory effort  - Oxygen administered by appropriate delivery if ordered  - Initiate smoking cessation education as indicated  - Encourage broncho-pulmonary hygiene including cough, deep breathe, Incentive Spirometry  - Assess the need for suctioning and aspirate as needed  - Assess and instruct to report SOB or any respiratory difficulty  - Respiratory Therapy support as indicated  Outcome: Progressing     Problem: SAFETY ADULT  Goal: Patient will remain free of falls  Description: INTERVENTIONS:  - Assess patient frequently for physical needs  -  Identify cognitive and physical deficits and behaviors that affect risk of falls    -  [place] fall precautions as indicated by assessment   - Educate patient/family on patient safety including physical limitations  - Instruct patient to call for assistance with activity based on assessment  - Modify environment to reduce risk of injury  - Consider OT/PT consult to assist with strengthening/mobility  Outcome: Progressing     Problem: PAIN - ADULT  Goal: Verbalizes/displays adequate comfort level or baseline comfort level  Description: Interventions:  - Encourage patient to monitor pain and request assistance  - Assess pain using appropriate pain scale  - Administer analgesics based on type and severity of pain and evaluate response  - Implement non-pharmacological measures as appropriate and evaluate response  - Consider cultural and social influences on pain and pain management  - Notify physician/advanced practitioner if interventions unsuccessful or patient reports new pain  Outcome: Progressing     Problem: Prexisting or High Potential for Compromised Skin Integrity  Goal: Skin integrity is maintained or improved  Description: INTERVENTIONS:  - Identify patients at risk for skin breakdown  - Assess and monitor skin integrity  - Assess and monitor nutrition and hydration status  - Monitor labs   - Assess for incontinence   - Turn and reposition patient  - Assist with mobility/ambulation  - Relieve pressure over bony prominences  - Avoid friction and shearing  - Provide appropriate hygiene as needed including keeping skin clean and dry  - Evaluate need for skin moisturizer/barrier cream  - Collaborate with interdisciplinary team   - Patient/family teaching  - Consider wound care consult   Outcome: Progressing

## 2021-01-14 NOTE — ASSESSMENT & PLAN NOTE
Wt Readings from Last 3 Encounters:   01/14/21 65 3 kg (143 lb 15 4 oz)   01/05/21 63 1 kg (139 lb 3 2 oz)   12/23/20 62 4 kg (137 lb 9 1 oz)       Presented with increased shortness of breath, dyspnea on exertion and lower extremity edema  14/5 ECHO: Systolic function was mildly reduced, Ejection fraction was estimated to be 45 %  There was mild diffuse hypokinesis (grade 1 diastolic dysfunction)      Lab Results   Component Value Date    NTBNP 1,254 (H) 01/07/2021    NTBNP 958 (H) 12/02/2020     - continue lasix with hold parameters  - Continue to monitor urine output  - Daily weights  - Cardiology following

## 2021-01-14 NOTE — PROGRESS NOTES
Progress Note - Cardiology   Juany New 79 y o  female MRN: [id number]  Unit/Bed#: S -01 Encounter: [id number]        Principal Problem:    Acute combined systolic and diastolic congestive heart failure (HCC)  Active Problems:    CAD (coronary artery disease)    Hyperlipidemia    Chest pain    Pulmonary edema cardiac cause (HCC)    Elevated troponin    Respiratory distress    Suprapubic pain            Assessment/Plan     1  Acute on chronic diastolic heart failure with pulmonary edema  CT of the chest reveals pulmonary interstitial edema at lung bases   Subtle ground-glass opacification lung bases throughout the lungs and a bronchovascular distribution suggest pulmonary alveolar edema  There is no PE  Diuretic-given IV diuretics pre cath  Resumed p o  Post catheterization      2  CAD prior CABG 2011- with closure of LIMA to LAD status post revascularization of the LAD and diagonal branch with PCI/drug-eluting stent 2011  Subsequent left heart catheterization December 2011 recurrent disease-prox LAD discrete 70% stenosis amenable to PCIl      Recently hospitalized with elevated troponin and chest pain concerning for NSTEMI 12/2020 pulmonary edema  Echo LVEF 45% ( previously 55) mild diffuse hypokinesis   Discussion with Interventional Cardiology, medical management advised   On discharge her nitrate, beta blocker, Ranexa, norvasc  have all been uptitrated     Proceeded with left heart catheterization this admission and LAMINE LAD/Lt main 1/1/2021     3  Elevated troponin - possible small NSTEMI vs type 2 MI d/t pulmonary edema in setting of known severe CAD    EKG-sinus tachycardia/LBBB ( old)  Troponin 0 02, 1 05, 2 31, 2 36  recurrent CP few days ago with rising troponin  [facility]  1/11- S/P LAD/ Lt main LAMINE     3   ICMP-LVEF 45% (prior EF 55%)  BB- Toprol 75- held d/t hypotension   ARB- Cozaar 50- held d/t hypotension  Diuretic-Lasix 40 mg PO BID- held today d/t hypotension  Will adjust meds  She received 2 L of fluid with rapid response yesterday but seems pretty euvolemic     Telemetry - NSR LBBB short runs NSVT  Increased toprol to 75mg yesterday     4  HTN-was  fairly well controlled with few isolated elevated BP  Her beta-blocker was increased yesterday due to tachycardia and NSVT  With rapid response yesterday she was hypotensive and had persistent hypotension through the evening  Now normotensive to low normal   Stated above will adjust medications and continue blood pressure parameters      5  HLD - atorvastatin 20mg   Cholesterol 168/triglyceride 88/HDL 62/LDL 88     6  Bilateral renal artery stenosis- bilateral SUKH could be contributing to flash pulmonary edema  BP has been labile  May need further w/u       7  Epigastric pain/nausea  Noncontrast CT abdomen gallstone without surrounding inflammation  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 Contributing to symptomatology  Yesterday epigastric pain has improved but still with nausea LUQ tenderness to palpation  Repeat CT of the abdomen yesterday with contrast-nonspecific enteritis/colitis, likely infectious  No bowel obstruction or pneumatosis  No complaints of abdominal pain or nausea this morning  Does have mild tenderness that left side and suprapubic  Mild lactic acidosis resolved     6  Aspiration PNA  Vomited a few days ago followed by increased work of breathing and hypoxia, leukocytosis and chest x-ray concerning for right lower lobe pneumonia  Started on Flagyl and Rocephin        Subjective/Objective  Seems clear this morning  She does not remember events of her hospitalization  No complaints of chest pain or shortness of breath  Sitting out of bed  Appears more alert  Tender left side of the abdomen on palpation but improved from yesterday  Suprapubic tenderness      Vitals: BP 99/56 (BP Location: Right arm)   Pulse 80   Temp 98 °F (36 7 °C) (Oral)   Resp 18   Ht 5' (1 524 m)   Wt 65 3 kg (143 lb 15 4 oz)   SpO2 99% BMI 28 12 kg/m²     Vitals:    01/14/21 0329 01/14/21 0600   Weight: 65 3 kg (143 lb 15 4 oz) 65 3 kg (143 lb 15 4 oz)     Orthostatic Blood Pressures      Most Recent Value   Blood Pressure  99/56 filed at 01/14/2021 1401   Patient Position - Orthostatic VS  Lying filed at 01/14/2021 1109            Intake/Output Summary (Last 24 hours) at 1/14/2021 0834  Last data filed at 1/14/2021 0002  Gross per 24 hour   Intake 30 ml   Output 1078 ml   Net -1048 ml       Invasive Devices     Peripheral Intravenous Line            Peripheral IV 01/10/21 Right;Ventral (anterior) Forearm 4 days    Peripheral IV 01/11/21 Right Antecubital 2 days                Current Facility-Administered Medications   Medication Dose Route Frequency    acetaminophen (TYLENOL) tablet 650 mg  650 mg Oral Q4H PRN    aluminum-magnesium hydroxide-simethicone (MYLANTA) oral suspension 30 mL  30 mL Oral Q4H PRN    aspirin (ECOTRIN LOW STRENGTH) EC tablet 81 mg  81 mg Oral Daily    atorvastatin (LIPITOR) tablet 20 mg  20 mg Oral Daily With Dinner    cefTRIAXone (ROCEPHIN) 1,000 mg in dextrose 5 % 50 mL IVPB  1,000 mg Intravenous Q24H    Diclofenac Sodium (VOLTAREN) 1 % topical gel 2 g  2 g Topical 4x Daily    fentanyl citrate (PF) 100 MCG/2ML 50 mcg  50 mcg Intravenous Once    furosemide (LASIX) tablet 40 mg  40 mg Oral BID (diuretic)    heparin (porcine) subcutaneous injection 5,000 Units  5,000 Units Subcutaneous Q8H [facility]    isosorbide mononitrate (IMDUR) 24 hr tablet 90 mg  90 mg Oral Daily    levalbuterol (XOPENEX) inhalation solution 1 25 mg  1 25 mg Nebulization Q6H PRN    loperamide (IMODIUM) capsule 2 mg  2 mg Oral TID PRN    losartan (COZAAR) tablet 50 mg  50 mg Oral Daily    metoprolol succinate (TOPROL-XL) 24 hr tablet 75 mg  75 mg Oral Daily    metroNIDAZOLE (FLAGYL) IVPB (premix) 500 mg 100 mL  500 mg Intravenous Q8H    morphine injection 1 mg  1 mg Intravenous Q4H PRN    morphine injection 1 mg  1 mg Intravenous Once    morphine injection 1 mg  1 mg Intravenous Once    nitroglycerin (NITROSTAT) SL tablet 0 4 mg  0 4 mg Sublingual Q5 Min PRN    ondansetron (ZOFRAN) injection 4 mg  4 mg Intravenous Once    ondansetron (ZOFRAN) injection 4 mg  4 mg Intravenous Q6H PRN    oxyCODONE (ROXICODONE) IR tablet 2 5 mg  2 5 mg Oral Q4H PRN    pantoprazole (PROTONIX) injection 40 mg  40 mg Intravenous Q12H [address]    polyethylene glycol (MIRALAX) packet 17 g  17 g Oral Daily    prasugrel (EFFIENT) tablet 10 mg  10 mg Oral Daily    ranolazine (RANEXA) 12 hr tablet 1,000 mg  1,000 mg Oral BID    senna (SENOKOT) tablet 8 6 mg  8 6 mg Oral HS    simethicone (MYLICON) chewable tablet 80 mg  80 mg Oral 4x Daily PRN    sodium chloride 0 9 % inhalation solution 3 mL  3 mL Nebulization Q6H PRN         Physical Exam: BP 99/56 (BP Location: Right arm)   Pulse 80   Temp 98 °F (36 7 °C) (Oral)   Resp 18   Ht 5' (1 524 m)   Wt 65 3 kg (143 lb 15 4 oz)   SpO2 99%   BMI 28 12 kg/m²     General Appearance:    Alert, cooperative, no distress, appears stated age   Head:    Normocephalic, no scleral icterus   Eyes:    PERRL   Nose:   Nares normal, septum midline, no drainage    Throat:   Lips, mucosa, and tongue normal   Neck:   Supple, symmetrical, trachea midline,              Lungs:     Rales/rhonchi RLL to auscultation bilaterally, respirations unlabored   Chest Wall:    No tenderness or deformity    Heart:    Regular rate and rhythm, S1 and S2 normal, no murmur, rub   or gallop   Abdomen:     Soft,tender left side    Extremities:   Extremities normal, atraumatic, no cyanosis or edema   Pulses:   2+ and symmetric all extremities   Skin:   Skin color, texture, turgor normal, no rashes or lesions   Neurologic:   Alert and oriented to person place and time, no focal deficits                 Lab Results:   Recent Results (from the past 72 hour(s))   Troponin I    Collection Time: 01/11/21 12:01 PM   Result Value Ref Range    Troponin I 3 29 (H) <=0 04 ng/mL   Magnesium    Collection Time: 01/11/21 12:08 PM   Result Value Ref Range    Magnesium 2 2 1 6 - 2 6 mg/dL   CBC and Platelet    Collection Time: 01/11/21 12:08 PM   Result Value Ref Range    WBC 19 96 (H) 4 31 - 10 16 Thousand/uL    RBC 4 82 3 81 - 5 12 Million/uL    Hemoglobin 14 8 11 5 - 15 4 g/dL    Hematocrit 44 9 34 8 - 46 1 %    MCV 93 82 - 98 fL    MCH 30 7 26 8 - 34 3 pg    MCHC 33 0 31 4 - 37 4 g/dL    RDW 13 1 11 6 - 15 1 %    Platelets 948 340 - 759 Thousands/uL    MPV 10 6 8 9 - 12 7 fL   Comprehensive metabolic panel    Collection Time: 01/11/21 12:08 PM   Result Value Ref Range    Sodium 132 (L) 136 - 145 mmol/L    Potassium 3 4 (L) 3 5 - 5 3 mmol/L    Chloride 96 (L) 100 - 108 mmol/L    CO2 24 21 - 32 mmol/L    ANION GAP 12 4 - 13 mmol/L    BUN 32 (H) 5 - 25 mg/dL    Creatinine 1 35 (H) 0 60 - 1 30 mg/dL    Glucose 195 (H) 65 - 140 mg/dL    Calcium 9 5 8 3 - 10 1 mg/dL    AST 65 (H) 5 - 45 U/L    ALT 28 12 - 78 U/L    Alkaline Phosphatase 92 46 - 116 U/L    Total Protein 8 0 6 4 - 8 2 g/dL    Albumin 3 9 3 5 - 5 0 g/dL    Total Bilirubin 0 97 0 20 - 1 00 mg/dL    eGFR 37 ml/min/1 73sq m   APTT    Collection Time: 01/11/21 12:48 PM   Result Value Ref Range    PTT 21 (L) 23 - 37 seconds   Protime-INR    Collection Time: 01/11/21 12:48 PM   Result Value Ref Range    Protime 12 3 11 6 - 14 5 seconds    INR 0 91 0 84 - 1 19   Procalcitonin with AM Reflex    Collection Time: 01/11/21 12:48 PM   Result Value Ref Range    Procalcitonin <0 05 <=0 25 ng/ml   POCT activated clotting time    Collection Time: 01/11/21  2:32 PM   Result Value Ref Range    Activated Clotting Time, i-STAT 299 (H) 89 - 137 sec    Specimen Type VENOUS    Procalcitonin Reflex    Collection Time: 01/12/21  6:35 AM   Result Value Ref Range    Procalcitonin 0 71 (H) <=0 25 ng/ml   CBC and differential    Collection Time: 01/12/21  6:35 AM   Result Value Ref Range    WBC 23 12 (H) 4 31 - 10 16 Thousand/uL    RBC 4 83 3 81 - 5 12 Million/uL    Hemoglobin 14 7 11 5 - 15 4 g/dL    Hematocrit 45 1 34 8 - 46 1 %    MCV 93 82 - 98 fL    MCH 30 4 26 8 - 34 3 pg    MCHC 32 6 31 4 - 37 4 g/dL    RDW 13 6 11 6 - 15 1 %    MPV 11 1 8 9 - 12 7 fL    Platelets 276 687 - 072 Thousands/uL    nRBC 0 /100 WBCs    Neutrophils Relative 87 (H) 43 - 75 %    Immat GRANS % 1 0 - 2 %    Lymphocytes Relative 5 (L) 14 - 44 %    Monocytes Relative 7 4 - 12 %    Eosinophils Relative 0 0 - 6 %    Basophils Relative 0 0 - 1 %    Neutrophils Absolute 20 20 (H) 1 85 - 7 62 Thousands/µL    Immature Grans Absolute 0 16 0 00 - 0 20 Thousand/uL    Lymphocytes Absolute 1 11 0 60 - 4 47 Thousands/µL    Monocytes Absolute 1 60 (H) 0 17 - 1 22 Thousand/µL    Eosinophils Absolute 0 00 0 00 - 0 61 Thousand/µL    Basophils Absolute 0 05 0 00 - 0 10 Thousands/µL   Comprehensive metabolic panel    Collection Time: 01/12/21  6:35 AM   Result Value Ref Range    Sodium 132 (L) 136 - 145 mmol/L    Potassium 4 6 3 5 - 5 3 mmol/L    Chloride 100 100 - 108 mmol/L    CO2 23 21 - 32 mmol/L    ANION GAP 9 4 - 13 mmol/L    BUN 30 (H) 5 - 25 mg/dL    Creatinine 1 09 0 60 - 1 30 mg/dL    Glucose 156 (H) 65 - 140 mg/dL    Calcium 9 1 8 3 - 10 1 mg/dL    Corrected Calcium 9 7 8 3 - 10 1 mg/dL     (H) 5 - 45 U/L    ALT 55 12 - 78 U/L    Alkaline Phosphatase 83 46 - 116 U/L    Total Protein 6 8 6 4 - 8 2 g/dL    Albumin 3 2 (L) 3 5 - 5 0 g/dL    Total Bilirubin 0 84 0 20 - 1 00 mg/dL    eGFR 48 ml/min/1 73sq m   Lipase    Collection Time: 01/12/21  6:35 AM   Result Value Ref Range    Lipase 50 (L) 73 - 393 u/L   Fingerstick Glucose (POCT)    Collection Time: 01/13/21  1:23 AM   Result Value Ref Range    POC Glucose 129 65 - 140 mg/dl   Comprehensive metabolic panel    Collection Time: 01/13/21  6:47 AM   Result Value Ref Range    Sodium 131 (L) 136 - 145 mmol/L    Potassium 3 8 3 5 - 5 3 mmol/L    Chloride 99 (L) 100 - 108 mmol/L    CO2 23 21 - 32 mmol/L    ANION GAP 9 4 - 13 mmol/L    BUN 23 5 - 25 mg/dL    Creatinine 1 04 0 60 - 1 30 mg/dL    Glucose 130 65 - 140 mg/dL    Calcium 8 6 8 3 - 10 1 mg/dL    Corrected Calcium 9 7 8 3 - 10 1 mg/dL     (H) 5 - 45 U/L    ALT 39 12 - 78 U/L    Alkaline Phosphatase 68 46 - 116 U/L    Total Protein 6 1 (L) 6 4 - 8 2 g/dL    Albumin 2 6 (L) 3 5 - 5 0 g/dL    Total Bilirubin 0 83 0 20 - 1 00 mg/dL    eGFR 51 ml/min/1 73sq m   Procalcitonin with AM Reflex    Collection Time: 01/13/21  6:47 AM   Result Value Ref Range    Procalcitonin 0 83 (H) <=0 25 ng/ml   CBC and differential    Collection Time: 01/13/21  8:40 AM   Result Value Ref Range    WBC 12 70 (H) 4 31 - 10 16 Thousand/uL    RBC 3 72 (L) 3 81 - 5 12 Million/uL    Hemoglobin 11 3 (L) 11 5 - 15 4 g/dL    Hematocrit 34 7 (L) 34 8 - 46 1 %    MCV 93 82 - 98 fL    MCH 30 4 26 8 - 34 3 pg    MCHC 32 6 31 4 - 37 4 g/dL    RDW 13 7 11 6 - 15 1 %    MPV 11 6 8 9 - 12 7 fL    Platelets 310 431 - 394 Thousands/uL    nRBC 0 /100 WBCs    Neutrophils Relative 84 (H) 43 - 75 %    Immat GRANS % 1 0 - 2 %    Lymphocytes Relative 7 (L) 14 - 44 %    Monocytes Relative 8 4 - 12 %    Eosinophils Relative 0 0 - 6 %    Basophils Relative 0 0 - 1 %    Neutrophils Absolute 10 69 (H) 1 85 - 7 62 Thousands/µL    Immature Grans Absolute 0 08 0 00 - 0 20 Thousand/uL    Lymphocytes Absolute 0 84 0 60 - 4 47 Thousands/µL    Monocytes Absolute 1 06 0 17 - 1 22 Thousand/µL    Eosinophils Absolute 0 00 0 00 - 0 61 Thousand/µL    Basophils Absolute 0 03 0 00 - 0 10 Thousands/µL   Fingerstick Glucose (POCT)    Collection Time: 01/13/21  2:54 PM   Result Value Ref Range    POC Glucose 117 65 - 140 mg/dl   Blood culture    Collection Time: 01/13/21  3:17 PM    Specimen: Hand, Right; Blood   Result Value Ref Range    Blood Culture Received in Microbiology Lab  Culture in Progress  Blood culture    Collection Time: 01/13/21  3:18 PM    Specimen: Hand, Left; Blood   Result Value Ref Range    Blood Culture Received in Microbiology Lab  Culture in Progress      CBC and differential    Collection Time: 01/13/21  3:18 PM   Result Value Ref Range    WBC 12 20 (H) 4 31 - 10 16 Thousand/uL    RBC 3 50 (L) 3 81 - 5 12 Million/uL    Hemoglobin 10 4 (L) 11 5 - 15 4 g/dL    Hematocrit 32 5 (L) 34 8 - 46 1 %    MCV 93 82 - 98 fL    MCH 29 7 26 8 - 34 3 pg    MCHC 32 0 31 4 - 37 4 g/dL    RDW 13 9 11 6 - 15 1 %    MPV 11 8 8 9 - 12 7 fL    Platelets 997 210 - 338 Thousands/uL    nRBC 0 /100 WBCs    Neutrophils Relative 83 (H) 43 - 75 %    Immat GRANS % 1 0 - 2 %    Lymphocytes Relative 8 (L) 14 - 44 %    Monocytes Relative 8 4 - 12 %    Eosinophils Relative 0 0 - 6 %    Basophils Relative 0 0 - 1 %    Neutrophils Absolute 10 09 (H) 1 85 - 7 62 Thousands/µL    Immature Grans Absolute 0 09 0 00 - 0 20 Thousand/uL    Lymphocytes Absolute 1 01 0 60 - 4 47 Thousands/µL    Monocytes Absolute 0 98 0 17 - 1 22 Thousand/µL    Eosinophils Absolute 0 00 0 00 - 0 61 Thousand/µL    Basophils Absolute 0 03 0 00 - 0 10 Thousands/µL   Lactic acid, plasma    Collection Time: 01/13/21  3:19 PM   Result Value Ref Range    LACTIC ACID 2 1 (HH) 0 5 - 2 0 mmol/L   Procalcitonin with AM Reflex    Collection Time: 01/13/21  3:21 PM   Result Value Ref Range    Procalcitonin 1 35 (H) <=0 25 ng/ml   Lactic acid 2 Hours    Collection Time: 01/13/21  7:18 PM   Result Value Ref Range    LACTIC ACID 1 4 0 5 - 2 0 mmol/L   Fingerstick Glucose (POCT)    Collection Time: 01/13/21  8:49 PM   Result Value Ref Range    POC Glucose 102 65 - 140 mg/dl   Urinalysis with microscopic    Collection Time: 01/14/21 12:25 AM   Result Value Ref Range    Clarity, UA Clear     Color, UA Yellow     Specific [place], UA 1 015 1 003 - 1 030    pH, UA 5 5 4 5, 5 0, 5 5, 6 0, 6 5, 7 0, 7 5, 8 0    Glucose, UA Negative Negative mg/dl    Ketones, UA Negative Negative mg/dl    Blood, UA Negative Negative    Protein, UA Negative Negative mg/dl    Nitrite, UA Positive (A) Negative    Bilirubin, UA Negative Negative Urobilinogen, UA 0 2 0 2, 1 0 E U /dl E U /dl    Leukocytes, UA Negative Negative    WBC, UA 0-1 (A) None Seen, 2-4 /hpf    RBC, UA 0-1 (A) None Seen, 2-4 /hpf    Bacteria, UA None Seen None Seen, Occasional /hpf    Epithelial Cells Occasional None Seen, Occasional /hpf   Lactic acid, plasma    Collection Time: 21  6:07 AM   Result Value Ref Range    LACTIC ACID 1 0 0 5 - 2 0 mmol/L     Imaging: I have personally reviewed pertinent reports  Tele- [address] [facility], [address] [facility]  [phone]     [facility]     Invasive Cardiovascular Lab Complete Report     Patient: Cristian Monroy  MR number: [id number]  Account number: [de-identified]  Study date: 2021  Gender: Female  : 1940  Height: 59 8 in  Weight: 136 lb  BSA: 1 58 mï¾²     Allergies: SULFA ANTIBIOTICS, FORMALDEHYDE, CODEINE     Diagnostic Cardiologist:  David Canchola MD  Interventional Cardiologist:  David Canchola MD  Primary Physician:  Alexy Phipps MD     SUMMARY     CORONARY CIRCULATION:  Ostial left main: There was a 75 % stenosis  The lesion was complex, eccentric, and mildly calcified  This is a likely culprit for the patient's clinical presentation  An intervention was performed  IVUS 4 9mm2 Area stenosis 70%  Proximal LAD: There was a diffuse 95 % stenosis  Severe diffuse disease on IVUS  1st obtuse marginal: There was a 100 % stenosis  Mid RCA: There was a 100 % stenosis  This lesion is a chronic total occlusion  Graft to the 1st diagonal: There was a 100 % stenosis at the proximal anastomosis  It does not appear amenable to intervention  Graft to the 1st obtuse marginal: There was a 100 % stenosis at the proximal anastomosis  It does not appear amenable to intervention  Graft to the distal RCA: There was a 50 % stenosis in the distal third of the graft   It appears amenable to percutaneous intervention      1ST LESION INTERVENTIONS:  A successful balloon angioplasty with stent procedure was performed on the 95 % lesion in the proximal LAD  Following intervention there was an excellent angiographic appearance with a 0 % residual stenosis  A Resolute Alex Rx 2 5 x 22mm drug-eluting stent was placed across the lesion and deployed at a maximum inflation pressure of 12 irwin      2ND LESION INTERVENTIONS:  A successful drug-eluting stent procedure was performed on the 75 % lesion in the ostial left main  Following intervention there was an excellent angiographic appearance with a 0 % residual stenosis  A Resolute Alex Rx 3 5 x 08mm drug-eluting stent was placed across the lesion and deployed at a maximum inflation pressure of 12 irwin      INDICATIONS:  --  Possible CAD: myocardial infarction without ST elevation (NSTEMI)  Counseling / Coordination of Care  Total time spent today 30 minutes  Greater than 50% of total time was spent with the patient and / or family counseling and / or coordination of care

## 2021-01-14 NOTE — ASSESSMENT & PLAN NOTE
Pain in suprapubic region on palpation during physical exam  Bladder scan reveals retention with the need for straight cath  Some pain diffusely  CT scan 1/13/2021 revealed colitis      - Patient encouraged to use commode, only small amount of urine expelled  - Straight cath as needed  - May require dominguez  - pro elayne and urine culture pending

## 2021-01-15 ENCOUNTER — TELEPHONE (OUTPATIENT)
Dept: FAMILY MEDICINE CLINIC | Facility: MEDICAL CENTER | Age: 81
End: 2021-01-15

## 2021-01-15 LAB
ANION GAP SERPL CALCULATED.3IONS-SCNC: 7 MMOL/L (ref 4–13)
BACTERIA UR CULT: NORMAL
BUN SERPL-MCNC: 20 MG/DL (ref 5–25)
CALCIUM SERPL-MCNC: 8.2 MG/DL (ref 8.3–10.1)
CHLORIDE SERPL-SCNC: 102 MMOL/L (ref 100–108)
CO2 SERPL-SCNC: 25 MMOL/L (ref 21–32)
CREAT SERPL-MCNC: 1.13 MG/DL (ref 0.6–1.3)
GFR SERPL CREATININE-BSD FRML MDRD: 46 ML/MIN/1.73SQ M
GLUCOSE SERPL-MCNC: 130 MG/DL (ref 65–140)
POTASSIUM SERPL-SCNC: 3.2 MMOL/L (ref 3.5–5.3)
SODIUM SERPL-SCNC: 134 MMOL/L (ref 136–145)

## 2021-01-15 PROCEDURE — 99232 SBSQ HOSP IP/OBS MODERATE 35: CPT | Performed by: INTERNAL MEDICINE

## 2021-01-15 PROCEDURE — 97163 PT EVAL HIGH COMPLEX 45 MIN: CPT

## 2021-01-15 PROCEDURE — 97110 THERAPEUTIC EXERCISES: CPT

## 2021-01-15 PROCEDURE — C9113 INJ PANTOPRAZOLE SODIUM, VIA: HCPCS | Performed by: FAMILY MEDICINE

## 2021-01-15 PROCEDURE — 80048 BASIC METABOLIC PNL TOTAL CA: CPT | Performed by: NURSE PRACTITIONER

## 2021-01-15 RX ORDER — METRONIDAZOLE 500 MG/1
500 TABLET ORAL EVERY 8 HOURS
Status: DISCONTINUED | OUTPATIENT
Start: 2021-01-15 | End: 2021-01-15

## 2021-01-15 RX ORDER — PANTOPRAZOLE SODIUM 40 MG/1
40 TABLET, DELAYED RELEASE ORAL EVERY 12 HOURS
Status: DISCONTINUED | OUTPATIENT
Start: 2021-01-15 | End: 2021-01-17 | Stop reason: HOSPADM

## 2021-01-15 RX ORDER — POTASSIUM CHLORIDE 20 MEQ/1
40 TABLET, EXTENDED RELEASE ORAL ONCE
Status: COMPLETED | OUTPATIENT
Start: 2021-01-15 | End: 2021-01-15

## 2021-01-15 RX ADMIN — METRONIDAZOLE 500 MG: 500 INJECTION, SOLUTION INTRAVENOUS at 06:01

## 2021-01-15 RX ADMIN — HEPARIN SODIUM 5000 UNITS: 5000 INJECTION INTRAVENOUS; SUBCUTANEOUS at 14:13

## 2021-01-15 RX ADMIN — PANTOPRAZOLE SODIUM 40 MG: 40 INJECTION, POWDER, FOR SOLUTION INTRAVENOUS at 09:09

## 2021-01-15 RX ADMIN — HEPARIN SODIUM 5000 UNITS: 5000 INJECTION INTRAVENOUS; SUBCUTANEOUS at 21:09

## 2021-01-15 RX ADMIN — POTASSIUM CHLORIDE 40 MEQ: 1500 TABLET, EXTENDED RELEASE ORAL at 14:13

## 2021-01-15 RX ADMIN — Medication 250 MG: at 09:09

## 2021-01-15 RX ADMIN — FUROSEMIDE 40 MG: 40 TABLET ORAL at 09:09

## 2021-01-15 RX ADMIN — PRASUGREL 10 MG: 10 TABLET, FILM COATED ORAL at 09:09

## 2021-01-15 RX ADMIN — Medication 125 MG: at 21:09

## 2021-01-15 RX ADMIN — METOPROLOL SUCCINATE 50 MG: 50 TABLET, EXTENDED RELEASE ORAL at 09:10

## 2021-01-15 RX ADMIN — Medication 125 MG: at 03:40

## 2021-01-15 RX ADMIN — Medication 125 MG: at 16:29

## 2021-01-15 RX ADMIN — Medication 125 MG: at 09:13

## 2021-01-15 RX ADMIN — HEPARIN SODIUM 5000 UNITS: 5000 INJECTION INTRAVENOUS; SUBCUTANEOUS at 05:52

## 2021-01-15 RX ADMIN — ATORVASTATIN CALCIUM 20 MG: 20 TABLET, FILM COATED ORAL at 16:29

## 2021-01-15 RX ADMIN — RANOLAZINE 1000 MG: 500 TABLET, FILM COATED, EXTENDED RELEASE ORAL at 09:13

## 2021-01-15 RX ADMIN — Medication 250 MG: at 16:29

## 2021-01-15 RX ADMIN — RANOLAZINE 1000 MG: 500 TABLET, FILM COATED, EXTENDED RELEASE ORAL at 16:29

## 2021-01-15 RX ADMIN — ASPIRIN 81 MG: 81 TABLET, COATED ORAL at 09:09

## 2021-01-15 RX ADMIN — DICLOFENAC SODIUM 2 G: 10 GEL TOPICAL at 09:10

## 2021-01-15 NOTE — ASSESSMENT & PLAN NOTE
Latest troponin 3 29 (1/11/2021) after patient stated she had abdominal/chest pain    - Cardiology on board  - Cardiac cath completed, stent placed Left main  - continue heparin

## 2021-01-15 NOTE — DISCHARGE INSTRUCTIONS
Heart Failure   WHAT YOU NEED TO KNOW:   Heart failure (HF) is a condition that does not allow your heart to fill or pump properly  Not enough oxygen in your blood gets to your organs and tissues  Fluid may not move through your body properly  Fluid builds up and causes swelling and difficulty breathing  This is known as congestive heart failure  HF may start in the left or right ventricle  HF is often caused by damage or injury to your heart  The damage may be caused by other heart problems, diabetes, or high blood pressure  The damage may have also been caused by an infection  HF is a long-term condition that tends to get worse over time  It is important to manage your health to improve your quality of life  DISCHARGE INSTRUCTIONS:   Call your local emergency number (911 in the [address] [facility]) if:   · You have any of the following signs of a heart attack:      ? Squeezing, pressure, or pain in your chest    ? You may  also have any of the following:     § Discomfort or pain in your back, neck, jaw, stomach, or arm    § Shortness of breath    § Nausea or vomiting    § Lightheadedness or a sudden cold sweat      Call your doctor if:   · Your heartbeat is fast, slow, or uneven all the time  · You have symptoms of worsening HF:      ? Shortness of breath at rest, at night, or that is getting worse in any way    ? Weight gain of 3 or more pounds (1 4 kg) in a day, or more than your healthcare provider says is okay    ? More swelling in your legs or ankles    ? Abdominal pain or swelling    ? More coughing    ? Loss of appetite    ? Feeling tired all the time    · You feel hopeless or depressed, or you have lost interest in things you used to enjoy  · You often feel worried or afraid  · You have questions or concerns about your condition or care  Medicines:   · Medicines  may be given to help regulate your heart rhythm and lower your blood pressure  You may also need medicines to help decrease extra fluids  Medicines, such as NSAIDs, may be stopped because they are causing your HF to become worse  · Take your medicine as directed  Contact your healthcare provider if you think your medicine is not helping or if you have side effects  Tell him or her if you are allergic to any medicine  Keep a list of the medicines, vitamins, and herbs you take  Include the amounts, and when and why you take them  Bring the list or the pill bottles to follow-up visits  Carry your medicine list with you in case of an emergency  Follow up with your doctor within 7 days and as directed: You may need to return for other tests  You may need home health care  A healthcare provider will monitor your vital signs, weight, and make sure your medicines are working  Write down your questions so you remember to ask them during your visits  Go to cardiac rehab if directed:  Cardiac rehab is a program run by specialists who will help you safely strengthen your heart  In the program you will learn about exercise, relaxation, stress management, and heart-healthy nutrition  Manage HF:   · Do not smoke  Nicotine and other chemicals in cigarettes and cigars can cause lung and heart damage  Ask your healthcare provider for information if you currently smoke and need help to quit  E-cigarettes or smokeless tobacco still contain nicotine  Talk to your healthcare provider before you use these products  · Do not drink alcohol or use illegal drugs  Alcohol and drugs can increase your risk for high blood pressure, diabetes, and coronary artery disease  · Eat heart-healthy foods and limit sodium (salt)  Eat more fresh fruits and vegetables  Eat fewer canned and processed foods  Replace butter and margarine with heart-healthy oils such as olive oil and canola oil  Other heart-healthy foods include walnuts, whole-grain breads, low-fat dairy products, beans, and lean meats  Fatty fish such as salmon and tuna are also heart healthy   Ask how much salt you can eat each day  · Manage any chronic health conditions you have  These include high blood pressure, diabetes, obesity, high cholesterol, metabolic syndrome, and COPD  You will have fewer symptoms if you manage these health conditions  Follow your healthcare provider's recommendations and follow up with him or her regularly  · Drink liquids as directed  You may need to limit the amount of liquids you drink if you have fluid buildup  Ask how much liquid to drink each day and which liquids are best for you  · Maintain a healthy weight  Being overweight can increase your risk for high blood pressure, diabetes, and coronary artery disease  These conditions can make your symptoms worse  Ask your healthcare provider how much you should weigh  Ask him or her to help you create a weight loss plan if you are overweight  · Stay active  Activity can help keep your symptoms from getting worse  Walking is a type of physical activity that helps maintain your strength and improve your mood  Physical activity also helps you manage your weight  Work with your healthcare provider to create an exercise plan that is right for you  · Weigh yourself every morning  Use the same scale, in the same spot  Do this after you use the bathroom, but before you eat or drink  Wear the same type of clothing each time  Write down your weight and call your healthcare provider if you have a sudden weight gain  Swelling and weight gain are signs of fluid buildup  · Get vaccines as directed  Get a flu shot every year  You may also need the pneumonia vaccine  The flu and pneumonia can be severe for a person who has HF  Vaccines protect you from these infections  Join a support group:  HF can be difficult to manage  It may be helpful to talk with others who have HF  You may learn how to better manage your condition or get emotional support   For more information:  · Kade Gonzalez Spencer Zaman   Phone: 2- 481 - [phone]  Web Address: https://www strong com/  [address] [facility] Information is for End User's use only and may not be sold, redistributed or otherwise used for commercial purposes  All illustrations and images included in CareNotes® are the copyrighted property of [employer]  or [address] [place] [address]  The above information is an  only  It is not intended as medical advice for individual conditions or treatments  Talk to your doctor, nurse or pharmacist before following any medical regimen to see if it is safe and effective for you  Coronary Artery Disease   WHAT YOU NEED TO KNOW:   What is coronary artery disease? Coronary artery disease (CAD) is narrowing of the arteries to your heart caused by a buildup of plaque  Plaque is made up of cholesterol and other substances  The narrowing in your arteries decreases the amount of blood that can flow to your heart  This causes your heart to get less oxygen  What increases my risk for CAD? · Age 36 years or older     · A family history of CAD     · Smoking or regular exposure to secondhand smoke     · A medical condition, such as high blood pressure, high cholesterol, or diabetes     · Obesity or lack of exercise    What are the signs and symptoms of CAD? You may not have any symptoms of CAD  The most common symptom is chest pain, also called angina  Angina may feel like burning, squeezing, or crushing tightness in your chest  The pain may spread to your neck, jaw, or shoulder blade  You may have other symptoms along with chest pain  These include nausea, vomiting, sweating, fainting, and hands and feet that are cold to the touch  How is CAD diagnosed? Your healthcare provider will ask you if you have a family history of CAD  He will also ask about your symptoms and about the medicines you are taking   You may need any of the following:  · Blood tests  will check for high cholesterol or other medical conditions that may have led to CAD  · An EKG  records the electrical activity of your heart  It is used to check your heart rhythm, and it may show if there is damage to your heart  · An echocardiogram  is a type of ultrasound  Sound waves are used to show the structure, movement, and blood vessels of your heart  · An exercise stress test  helps healthcare providers see the changes that take place in your heart during exercise  An EKG is done while you ride an exercise bike or walk on a treadmill  Healthcare providers will ask if you have chest pain or trouble breathing during the test  An exercise test may be combined with an echocardiogram or nuclear isotope imaging  Nuclear isotopes are very small amounts of radioactive material that are injected into your bloodstream  Images show areas of your heart that have decreased blood flow  · A stress test with medicine  may be done if you cannot do the exercise stress test  You are given medicine that causes your heart to work harder  You will be connected to a stress test machine  This test is combined with the echocardiogram or nuclear isotope imaging  · An angiography, CT scan, or MRI  may be done to take pictures of your blood vessels and arteries  The pictures may show how narrow or blocked your blood vessels are  You may be given contrast liquid to help healthcare providers see the pictures better  Tell the healthcare provider if you have ever had an allergic reaction to contrast liquid  Which medicines are used to treat CAD? · Blood pressure medicines  are given to lower your blood pressure  These medicines may include ACE inhibitors and beta-blockers  ACE inhibitors help keep your blood vessels relaxed and open  This helps keep blood flowing into your heart  Beta-blockers keep your heart pumping strongly and regularly  This helps keep your heart from working too hard to get oxygen       · Cholesterol medicines help lower blood cholesterol levels  · Nitrates , such as nitroglycerin, relax the arteries of your heart so it gets more oxygen  They help to relieve your chest pain  · Antiplatelets , such as aspirin, help prevent blood clots  Take your antiplatelet medicine exactly as directed  These medicines make it more likely for you to bleed or bruise  If you are told to take aspirin, do not take acetaminophen or ibuprofen instead  · Blood thinners  keep clots from forming in your blood  Clots may cause heart attacks, strokes, or death  This medicine makes it more likely for you to bleed or bruise  · Do not take certain medicines without asking your healthcare provider first   These include NSAIDs, herbal or vitamin supplements, or hormones (estrogen or progestin)  Which procedures are used to treat CAD? · An angioplasty  may be done to open an artery blocked by plaque  A tube with a balloon on the end is threaded into the blocked artery  Once the tube is in the artery, the balloon is inflated  As the balloon inflates, it presses the plaque against the artery wall to open the artery  A stent may be placed in your artery to keep it open  · Coronary artery bypass surgery (CABG)  is open heart surgery  Healthcare providers take arteries or veins from other areas in your body and use them to bypass or go around the blocked arteries of your heart  What is cardiac rehabilitation? Your healthcare provider may recommend that you attend cardiac rehabilitation (rehab)  This is a program run by specialists who will help you safely strengthen your heart and reduce the risk for more heart disease  The plan includes exercise, relaxation, stress management, and heart-healthy nutrition  Healthcare providers will also check to make sure any medicines you are taking are working  What can I do to manage CAD? · Do not smoke  Nicotine and other chemicals in cigarettes and cigars can cause heart and lung damage  Ask your healthcare provider for information if you currently smoke and need help to quit  E-cigarettes or smokeless tobacco still contain nicotine  Talk to your healthcare provider before you use these products  · Exercise regularly  Exercise at least 30 minutes each day, on most days of the week  Exercise helps to lower high cholesterol and high blood pressure  It can also help you maintain a healthy weight  Ask your healthcare provider about the kind of exercise you should do and how to get started  · Maintain a healthy weight  If you are overweight, talk to your healthcare provider about how to lose weight  A weight loss of 10% can improve your heart health  · Eat heart-healthy foods  Include fresh fruits and vegetables in your meal plan  Choose low-fat foods, such as skim or 1% fat milk, low-fat cheese and yogurt, fish, chicken (without skin), and lean meats  Eat two 4-ounce servings of fish high in omega-3 fats each week, such as salmon, fresh tuna, and herring  Do not eat foods that are high in sodium, such as canned foods, potato chips, salty snacks, and cold cuts  Put less table salt on your food  · Limit or do not drink alcohol  A drink of alcohol is 12 ounces of beer, 5 ounces of wine, or 1½ ounces of liquor  · Manage other health conditions  Follow your healthcare provider's advice on how to manage other conditions that can affect your heart health  These include diabetes, high blood pressure, and high cholesterol  You may need to take medicines for these conditions and make other lifestyle changes  · Ask if you should have a flu vaccine  The flu can be dangerous for a person who has CAD  The flu vaccine is available every year in the fall  Call 911 for any of the following:   · You have any of the following signs of a heart attack:      ?  Squeezing, pressure, or pain in your chest    ? You may  also have any of the following:     § Discomfort or pain in your back, neck, jaw, stomach, or arm    § Shortness of breath    § Nausea or vomiting    § Lightheadedness or a sudden cold sweat      When should I contact my healthcare provider? · You have chest pain that is more frequent, or you have chest pain at rest      · You have questions or concerns about your condition or care  CARE AGREEMENT:   You have the right to help plan your care  Learn about your health condition and how it may be treated  Discuss treatment options with your healthcare providers to decide what care you want to receive  You always have the right to refuse treatment  The above information is an  only  It is not intended as medical advice for individual conditions or treatments  Talk to your doctor, nurse or pharmacist before following any medical regimen to see if it is safe and effective for you  © Copyright [address] Information is for End User's use only and may not be sold, redistributed or otherwise used for commercial purposes   All illustrations and images included in CareNotes® are the copyrighted property of [employer]  or [address] [place] [address]

## 2021-01-15 NOTE — ASSESSMENT & PLAN NOTE
Pain in suprapubic region on palpation during physical exam  Bladder scan continue to reveal retention with the need for straight cath  Some pain diffusely  CT scan 1/13/2021 revealed colitis      - Patient encouraged to use commode, only small amount of urine expelled  - Straight cath as needed  - May require dominguez  - Urine culture showed no growth

## 2021-01-15 NOTE — ASSESSMENT & PLAN NOTE
Patient requiring 2L o2 via NC  States she had vomited 1/11/2021 which may have caused an aspiration   CXR concerning for RLL pneumonia but is improving      - d/c rocephin and flagyl  - Monitor vitals

## 2021-01-15 NOTE — ASSESSMENT & PLAN NOTE
Wt Readings from Last 3 Encounters:   01/15/21 65 9 kg (145 lb 3 2 oz)   01/05/21 63 1 kg (139 lb 3 2 oz)   12/23/20 62 4 kg (137 lb 9 1 oz)     · Cardiology following   · Continue Lasix 40mg PO   · Patient on 2L o2  · CXR revealed RLL infiltrate which is now improving  · Xopenex ordered PRN  · Continue to monitor

## 2021-01-15 NOTE — PROGRESS NOTES
The metronidazole has / have been converted to Oral per [facility] IV-to-PO Auto-Conversion Protocol for Adults as approved by the Pharmacy and Therapeutics Committee  The patient met all eligible criteria:  3 Age = 24years old   2) Received at least one dose of the IV form   3) Receiving at least one other scheduled oral/enteral medication   4) Tolerating an oral/enteral diet   and did not have any exclusions:   1) Critical care patient   2) Active GI bleed (IF assessing H2RAs or PPIs)   3) Continuous tube feeding (IF assessing cipro, doxycycline, levofloxacin, minocycline, rifampin, or voriconazole)   4) Receiving PO vancomycin (IF assessing metronidazole)   5) Persistent nausea and/or vomiting   6) Ileus or gastrointestinal obstruction   7) Jenna/nasogastric tube set for continuous suction   8) Specific order not to automatically convert to PO (in the order's comments or if discussed in the most recent Infectious Disease or primary team's progress notes)

## 2021-01-15 NOTE — PHYSICAL THERAPY NOTE
PHYSICAL THERAPY EVALUATION NOTE    Patient Name: Dev Kahn  [id number] Date: 1/15/2021  AGE:   79 y o  Mrn:   [id number]  ADMIT DX:  CHF (congestive heart failure) (HCC) [I50 9]  Chest pain [R07 9]  Pulmonary edema [J81 1]  Coronary artery disease involving native heart without angina pectoris, unspecified vessel or lesion type [I25 10]    Past Medical History:   Diagnosis Date    Anal fissure     Cardiac disorder     Esophageal reflux     Esophagitis, reflux     Hemorrhoids     Hepatic hemangioma     Last Assessed: 1/13/2015    Herpes zoster     History of colonic polyps     Hypertension     [facility]schemic colitis ([facility] )     Lumbar herniated disc     Malignant neoplasm without specification of site ([place] [id number] )     Nephrolithiasis     L  Lithotripsy    Nontoxic single thyroid nodule     Last Assessed: 1/13/2015    Osteoarthritis     Overactive bladder     Raynaud disease     Respiratory system disease     Sjogren's disease ([facility] )     Spinal stenosis     PONCHO (stress urinary incontinence, female)     Uterovaginal prolapse     Grade I-II     Length Of Stay: 8  PHYSICAL THERAPY EVALUATION :   01/15/21 0810   PT Last Visit   PT Visit Date 01/15/21   Note Type   Note type Evaluation   Pain Assessment   Pain Assessment Tool Pain Assessment not indicated - pt denies pain   Home Living   Type of [address] One level; Other (Comment)  (2 [address]  does not use basement )   Additional Comments lives w/ spouse  ambulates w/o device  owns cane, walker, and shower chair  independent w/ ADLs and IADLs  no falls in last 6 months  no history of supplemental oxygen use  Prior Function   Comments pt seen supine in bed  agreed to PT eval  denied pain or dizziness  reports feeling tired  pt needed ocasional input for task focus  Restrictions/Precautions   Other Precautions Impulsive; Chair Alarm; Bed Alarm;Multiple lines;O2;Fall Risk General   Additional Pertinent History 1/13/21 at 20:00, blood pressure was 70/45   Family/Caregiver Present No   Cognition   Arousal/Participation Cooperative   Orientation Level Oriented to person;Oriented to place;Oriented to time;Disoriented to situation; Other (Comment)  (pt was identified w/ full name, birth date)   Following Commands Follows one step commands with increased time or repetition   Comments 2L oxygen via nasal cannula  resting pulse ox 95% and 75 BPM, active 93% and 89 BPM  supine blood pressure 107/68  RUE Assessment   RUE Assessment WFL  (3+/5, shoulder 3/5)   LUE Assessment   LUE Assessment WFL  (3+/5, shoulder 3/5)   RLE Assessment   RLE Assessment WFL  (3+/5)   LLE Assessment   LLE Assessment WFL  (3+/5)   Coordination   Movements are Fluid and Coordinated 1   Light Touch   RLE Light Touch Grossly intact   LLE Light Touch Grossly intact   Bed Mobility   Supine to Sit 3  Moderate assistance   Additional items Assist x 1;HOB elevated; Bedrails; Increased time required;Verbal cues;LE management  (for trunk/LE positioning, breathing technique)   Additional Comments 30 second chair stand test: 0 (as pt is unable to stand w/o use of UEs)  Transfers   Sit to Stand 3  Moderate assistance   Additional items Assist x 1; Increased time required;Verbal cues  (for hand placement, LE positioning)   Stand to Sit 4  Minimal assistance   Additional items Assist x 1; Increased time required;Verbal cues  (for body positioning, hand placement)   Toilet transfer 3  Moderate assistance   Additional items Assist x 1;Standard toilet; Increased time required;Armrests; Verbal cues  (for grab bar use, LE positioning)   Additional Comments pt was unable to wipe self after using the bathroom  pt had loose bowel movement (caught in hat at toilet)  Demario Paulson was notified  Ambulation/Elevation   Gait pattern Forward Flexion;Narrow SUSAN; Decreased foot clearance; Short stride; Excessively slow   Gait Assistance 3  Moderate assist   Additional items Assist x 1;Verbal cues  (for walker positioning, breathing technique)   Assistive Device Rolling walker   Distance 20, 30 feet  additional ambulation not possible due to fatigue   Stair Management Assistance Not tested  (due to limited ambulation tolerance, safety awareness)   Balance   Static Sitting Fair +   Static Standing Poor +   Ambulatory Poor  (w/ roller walker)   Activity Tolerance   Activity Tolerance Patient limited by fatigue   Nurse Made Aware spoke to [facility]   Assessment   Prognosis Fair   Problem List Decreased strength;Decreased endurance; Impaired balance;Decreased mobility; Decreased safety awareness   Assessment Pt presents with chest pain with SOB  Dx: acute combined systolic and diastolic CHF, suprapubic pain, respiratory distress, and elevated troponin  1/13/21 rapid response due to low SBP and altered mental status  order placed for PT eval and tx  pt presents w/ comorbidities of CAD, HTN, herniated lumbar disc, OA, spinal stenosis, and hyperlipidemia and personal factors of advanced age and stair(s) to enter home  pt presents w/ weakness, decreased endurance, impaired balance, gait deviations, decreased safety awareness and fall risk  these impairments are evident in findings from physical examination (weakness), mobility assessment (need for min to mod assist w/ all phases of mobility when usually mobilizing independently, tolerance to only 30 feet of ambulation and need for cueing for mobility technique), and Barthel Index: 45/100 and 30 second chair stand test: 0 (less than 9 indicates fall risk in females [de-identified]to 80years old)  pt needed input for task focus and mobility technique/safety  pt is at risk for falls due to physical and safety awareness deficits   pt's clinical presentation is unstable/unpredictable (evident in poor blood pressure control, need for assist w/ all phases of mobility when usually mobilizing independently, tolerance to only 30 feet of ambulation, need for supplemental oxygen in order to maintain oxygen saturation and need for input for mobility technique/safety)  pt needs inpatient PT tx to improve mobility deficits and progress mobility training as appropriate  discharge recommendation is for inpatient rehab to reduce fall risk and maximize level of functional independence  Pt would benefit from OT consult to address safety awareness and ADL completion  Goals   Patient Goals go home soon  STG Expiration Date 01/25/21   Short Term Goal #1 pt will: Increase bilateral LE strength 1/2 grade to facilitate independent mobility, Perform all bed mobility tasks modified independent to decrease fall risk factors, Perform all transfers w/ supervision to improve independence, Ambulate 120 ft  with least restrictive assistive device w/ supervision w/o LOB to expedite safe return home w/ family support, Navigate 2 stairs w/ minx1 with unilateral handrail to facilitate return to previous living environment, Increase all balance 1 grade to decrease risk for falls, Complete exercise program independently to increase strength and endurance, Tolerate 3 hr OOB to faciliate upright tolerance, Improve Barthel Index score to 70 or greater to facilitate independence and Increase 30 second chair stand test score to 4 or greater to decrease risk for falls   Plan   Treatment/Interventions Functional transfer training;LE strengthening/ROM; Elevations; Therapeutic exercise; Endurance training;Patient/family training;Equipment eval/education;Gait training;Bed mobility   PT Frequency 5x/wk   Recommendation   PT Discharge Recommendation Post-Acute Rehabilitation Services   Equipment Recommended Walker   Additional Comments Pt would benefit from OT consult to address safety awareness and ADL completion      Barthel Index   Feeding 10   Bathing 0   Grooming Score 5   Dressing Score 5   Bladder Score 5   Bowels Score 5   Toilet Use Score 5   Transfers (Bed/Chair) Score 10 Mobility (Level Surface) Score 0   Stairs Score 0   Barthel Index Score 45     30 second chair stand test: 0 (less than 9 indicates fall risk in females [de-identified]to 80years old)  Skilled PT recommended while in hospital and upon DC to progress pt toward treatment goals       Liv Figueroa, PT

## 2021-01-15 NOTE — TELEPHONE ENCOUNTER
Ramesh Laboy from [facility] called  Patient is due to be discharged from the hospital  He is asking if Dr Leatha Campoverde would be willing to sign home health care orders and nursing orders upon d/c  We mst likely will need a TCM-still admitted as of now  Follow up Monday

## 2021-01-15 NOTE — ASSESSMENT & PLAN NOTE
Improved  Initial chest pain located in the right radiating to both arms  Pain was described as squeezing, tearing and rated 10/10  Alleviating factors: Nitroglycerin and rest  Worsened by: deep inspiration, emotional stress and walking    No results for input(s): TROPONINI in the last 72 hours  Plan:   - Cardiac cath completed 1/11/2021  Stent placed left main   Signs of mesenteric ischemia noted  - Continue heparin and lasix as per cardiology

## 2021-01-15 NOTE — PROGRESS NOTES
Progress Note - Cardiology   Aleshia Barrera 79 y o  female MRN: [id number]  Unit/Bed#: S -01 Encounter: [id number]        Principal Problem:    Acute combined systolic and diastolic congestive heart failure (HCC)  Active Problems:    CAD (coronary artery disease)    Hyperlipidemia    Chest pain    Pulmonary edema cardiac cause (HCC)    Elevated troponin    Respiratory distress    Suprapubic pain          Assessment/Plan     1  Acute on chronic diastolic heart failure with pulmonary edema  CT of the chest reveals pulmonary interstitial edema at lung bases   Subtle ground-glass opacification lung bases throughout the lungs and a bronchovascular distribution suggest pulmonary alveolar edema  There is no PE  Diuretic-given IV diuretics pre cath   Resumed p o  Post catheterization      2  CAD prior CABG 2011- with closure of LIMA to LAD status post revascularization of the LAD and diagonal branch with PCI/drug-eluting stent 2011  Subsequent left heart catheterization December 2011 recurrent disease-prox LAD discrete 70% stenosis amenable to PCIl      Recently hospitalized with elevated troponin and chest pain concerning for NSTEMI 12/2020 pulmonary edema  Echo LVEF 45% ( previously 55) mild diffuse hypokinesis   Discussion with Interventional Cardiology, medical management advised   On discharge her nitrate, beta blocker, Ranexa, norvasc  have all been uptitrated     Proceeded with left heart catheterization this admission and LAMINE LAD/Lt main 1/1/2021     3  Elevated troponin - likely NSTEMI I  EKG-sinus tachycardia/LBBB ( old)  Troponin 0 02, 1 05, 2 31, 2 36  recurrent CP few days ago with rising troponin  [facility]  1/11- S/P LAD/ Lt main LAMINE     3  ICMP-LVEF 45% (prior EF 55%)  BB- Toprol 50 held d/t hypotension   ARB- Cozaar 25- held d/t hypotension  Diuretic-lasix 40mg PO daily  Weight - we need a stand scale weight today   I/O have been negative  Continues with poor / low PO intake       4  HTN-initially elevated  1/13 with rapid response persistently hypotensive  Meds adjusted  Now low normal  ARB held this am ( SBP under 110)      5  HLD - atorvastatin 20mg   Cholesterol 168/triglyceride 88/HDL 62/LDL 88     6  Bilateral renal artery stenosis- bilateral SUKH could be contributing to flash pulmonary edema  BP had been labile  May need further w/u       7  Epigastric pain/nausea  Noncontrast CT abdomen gallstone without surrounding inflammation  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  Contributing to symptomatology  Yesterday epigastric pain has improved but still with nausea LUQ tenderness to palpation  Repeat CT of the abdomen yesterday with contrast-nonspecific enteritis/colitis, likely infectious  No bowel obstruction or pneumatosis  No complaints of abdominal pain or nausea this morning  Mild lactic acidosis resolved     6  Aspiration PNA  Vomited a few days ago followed by increased work of breathing and hypoxia, leukocytosis and chest x-ray concerning for right lower lobe pneumonia  Started on Flagyl and Rocephin  Subjective/Objective   Chief Complaint/Subjective  Patient without complaint of SOB  Feels fatigued  No CP  No abdominal pain, nausea , vomiting   Not eating well        Vitals: /64 (BP Location: Left arm)   Pulse 75   Temp 97 5 °F (36 4 °C) (Oral)   Resp 16   Ht 5' (1 524 m)   Wt 67 9 kg (149 lb 11 1 oz)   SpO2 96%   BMI 29 23 kg/m²     Vitals:    01/14/21 0600 01/15/21 0600   Weight: 65 3 kg (143 lb 15 4 oz) 67 9 kg (149 lb 11 1 oz)     Orthostatic Blood Pressures      Most Recent Value   Blood Pressure  117/64 filed at 01/15/2021 0734   Patient Position - Orthostatic VS  Lying filed at 01/15/2021 0734            Intake/Output Summary (Last 24 hours) at 1/15/2021 0934  Last data filed at 1/14/2021 2000  Gross per 24 hour   Intake 320 ml   Output    Net 320 ml       Invasive Devices     Peripheral Intravenous Line            Peripheral IV 01/14/21 Right Forearm less than 1 day                Current Facility-Administered Medications   Medication Dose Route Frequency    acetaminophen (TYLENOL) tablet 650 mg  650 mg Oral Q4H PRN    aluminum-magnesium hydroxide-simethicone (MYLANTA) oral suspension 30 mL  30 mL Oral Q4H PRN    aspirin (ECOTRIN LOW STRENGTH) EC tablet 81 mg  81 mg Oral Daily    atorvastatin (LIPITOR) tablet 20 mg  20 mg Oral Daily With Dinner    cefTRIAXone (ROCEPHIN) 1,000 mg in dextrose 5 % 50 mL IVPB  1,000 mg Intravenous Q24H    Diclofenac Sodium (VOLTAREN) 1 % topical gel 2 g  2 g Topical 4x Daily    fentanyl citrate (PF) 100 MCG/2ML 50 mcg  50 mcg Intravenous Once    furosemide (LASIX) tablet 40 mg  40 mg Oral Daily    heparin (porcine) subcutaneous injection 5,000 Units  5,000 Units Subcutaneous Q8H [address]    levalbuterol (XOPENEX) inhalation solution 1 25 mg  1 25 mg Nebulization Q6H PRN    loperamide (IMODIUM) capsule 2 mg  2 mg Oral TID PRN    losartan (COZAAR) tablet 25 mg  25 mg Oral Daily    metoprolol succinate (TOPROL-XL) 24 hr tablet 50 mg  50 mg Oral Daily    metroNIDAZOLE (FLAGYL) IVPB (premix) 500 mg 100 mL  500 mg Intravenous Q8H    morphine injection 1 mg  1 mg Intravenous Q4H PRN    morphine injection 1 mg  1 mg Intravenous Once    morphine injection 1 mg  1 mg Intravenous Once    nitroglycerin (NITROSTAT) SL tablet 0 4 mg  0 4 mg Sublingual Q5 Min PRN    ondansetron (ZOFRAN) injection 4 mg  4 mg Intravenous Once    ondansetron (ZOFRAN) injection 4 mg  4 mg Intravenous Q6H PRN    oxyCODONE (ROXICODONE) IR tablet 2 5 mg  2 5 mg Oral Q4H PRN    pantoprazole (PROTONIX) injection 40 mg  40 mg Intravenous Q12H [place]    polyethylene glycol (MIRALAX) packet 17 g  17 g Oral Daily PRN    prasugrel (EFFIENT) tablet 10 mg  10 mg Oral Daily    ranolazine (RANEXA) 12 hr tablet 1,000 mg  1,000 mg Oral BID    saccharomyces boulardii (FLORASTOR) capsule 250 mg  250 mg Oral BID    senna (SENOKOT) tablet 8 6 mg  8 6 mg Oral HS PRN    simethicone (MYLICON) chewable tablet 80 mg  80 mg Oral 4x Daily PRN    sodium chloride 0 9 % inhalation solution 3 mL  3 mL Nebulization Q6H PRN    vancomycin (VANCOCIN) oral solution 125 mg  125 mg Oral Q6H [facility]         Physical Exam: /64 (BP Location: Left arm)   Pulse 75   Temp 97 5 °F (36 4 °C) (Oral)   Resp 16   Ht 5' (1 524 m)   Wt 67 9 kg (149 lb 11 1 oz)   SpO2 96%   BMI 29 23 kg/m²     General Appearance:    Alert, cooperative, no distress, appears stated age   Head:    Normocephalic, no scleral icterus   Eyes:    PERRL   Nose:   Nares normal, septum midline, no drainage    Throat:   Lips, mucosa, and tongue normal   Neck:   Supple, symmetrical, trachea midline,       no carotid    bruit or JVD   Back:     Symmetric, no CVA tenderness   Lungs:     Clear to auscultation bilaterally, respirations unlabored   Chest Wall:    No tenderness or deformity    Heart:    Regular rate and rhythm, S1 and S2 normal, no murmur, rub   or gallop   Abdomen:     Soft, non-tender, bowel sounds active all four quadrants,     no masses, no organomegaly   Extremities:   Extremities normal, atraumatic, no cyanosis or edema   Pulses:   2+ and symmetric all extremities   Skin:   Skin color, texture, turgor normal, no rashes or lesions   Neurologic:   Alert and oriented to person place and time, no focal deficits                 Lab Results:   Recent Results (from the past 72 hour(s))   Fingerstick Glucose (POCT)    Collection Time: 01/13/21  1:23 AM   Result Value Ref Range    POC Glucose 129 65 - 140 mg/dl   Comprehensive metabolic panel    Collection Time: 01/13/21  6:47 AM   Result Value Ref Range    Sodium 131 (L) 136 - 145 mmol/L    Potassium 3 8 3 5 - 5 3 mmol/L    Chloride 99 (L) 100 - 108 mmol/L    CO2 23 21 - 32 mmol/L    ANION GAP 9 4 - 13 mmol/L    BUN 23 5 - 25 mg/dL    Creatinine 1 04 0 60 - 1 30 mg/dL    Glucose 130 65 - 140 mg/dL    Calcium 8 6 8 3 - 10 1 mg/dL    Corrected Calcium 9 7 8 3 - 10 1 mg/dL  (H) 5 - 45 U/L    ALT 39 12 - 78 U/L    Alkaline Phosphatase 68 46 - 116 U/L    Total Protein 6 1 (L) 6 4 - 8 2 g/dL    Albumin 2 6 (L) 3 5 - 5 0 g/dL    Total Bilirubin 0 83 0 20 - 1 00 mg/dL    eGFR 51 ml/min/1 73sq m   Procalcitonin with AM Reflex    Collection Time: 01/13/21  6:47 AM   Result Value Ref Range    Procalcitonin 0 83 (H) <=0 25 ng/ml   CBC and differential    Collection Time: 01/13/21  8:40 AM   Result Value Ref Range    WBC 12 70 (H) 4 31 - 10 16 Thousand/uL    RBC 3 72 (L) 3 81 - 5 12 Million/uL    Hemoglobin 11 3 (L) 11 5 - 15 4 g/dL    Hematocrit 34 7 (L) 34 8 - 46 1 %    MCV 93 82 - 98 fL    MCH 30 4 26 8 - 34 3 pg    MCHC 32 6 31 4 - 37 4 g/dL    RDW 13 7 11 6 - 15 1 %    MPV 11 6 8 9 - 12 7 fL    Platelets 595 857 - 205 Thousands/uL    nRBC 0 /100 WBCs    Neutrophils Relative 84 (H) 43 - 75 %    Immat GRANS % 1 0 - 2 %    Lymphocytes Relative 7 (L) 14 - 44 %    Monocytes Relative 8 4 - 12 %    Eosinophils Relative 0 0 - 6 %    Basophils Relative 0 0 - 1 %    Neutrophils Absolute 10 69 (H) 1 85 - 7 62 Thousands/µL    Immature Grans Absolute 0 08 0 00 - 0 20 Thousand/uL    Lymphocytes Absolute 0 84 0 60 - 4 47 Thousands/µL    Monocytes Absolute 1 06 0 17 - 1 22 Thousand/µL    Eosinophils Absolute 0 00 0 00 - 0 61 Thousand/µL    Basophils Absolute 0 03 0 00 - 0 10 Thousands/µL   Fingerstick Glucose (POCT)    Collection Time: 01/13/21  2:54 PM   Result Value Ref Range    POC Glucose 117 65 - 140 mg/dl   Blood culture    Collection Time: 01/13/21  3:17 PM    Specimen: Hand, Right; Blood   Result Value Ref Range    Blood Culture No Growth at 24 hrs  Blood culture    Collection Time: 01/13/21  3:18 PM    Specimen: Hand, Left; Blood   Result Value Ref Range    Blood Culture No Growth at 24 hrs      CBC and differential    Collection Time: 01/13/21  3:18 PM   Result Value Ref Range    WBC 12 20 (H) 4 31 - 10 16 Thousand/uL    RBC 3 50 (L) 3 81 - 5 12 Million/uL    Hemoglobin 10 4 (L) 11 5 - 15 4 g/dL    Hematocrit 32 5 (L) 34 8 - 46 1 %    MCV 93 82 - 98 fL    MCH 29 7 26 8 - 34 3 pg    MCHC 32 0 31 4 - 37 4 g/dL    RDW 13 9 11 6 - 15 1 %    MPV 11 8 8 9 - 12 7 fL    Platelets 043 921 - 120 Thousands/uL    nRBC 0 /100 WBCs    Neutrophils Relative 83 (H) 43 - 75 %    Immat GRANS % 1 0 - 2 %    Lymphocytes Relative 8 (L) 14 - 44 %    Monocytes Relative 8 4 - 12 %    Eosinophils Relative 0 0 - 6 %    Basophils Relative 0 0 - 1 %    Neutrophils Absolute 10 09 (H) 1 85 - 7 62 Thousands/µL    Immature Grans Absolute 0 09 0 00 - 0 20 Thousand/uL    Lymphocytes Absolute 1 01 0 60 - 4 47 Thousands/µL    Monocytes Absolute 0 98 0 17 - 1 22 Thousand/µL    Eosinophils Absolute 0 00 0 00 - 0 61 Thousand/µL    Basophils Absolute 0 03 0 00 - 0 10 Thousands/µL   Lactic acid, plasma    Collection Time: 01/13/21  3:19 PM   Result Value Ref Range    LACTIC ACID 2 1 (HH) 0 5 - 2 0 mmol/L   Procalcitonin with AM Reflex    Collection Time: 01/13/21  3:21 PM   Result Value Ref Range    Procalcitonin 1 35 (H) <=0 25 ng/ml   Lactic acid 2 Hours    Collection Time: 01/13/21  7:18 PM   Result Value Ref Range    LACTIC ACID 1 4 0 5 - 2 0 mmol/L   Fingerstick Glucose (POCT)    Collection Time: 01/13/21  8:49 PM   Result Value Ref Range    POC Glucose 102 65 - 140 mg/dl   Urinalysis with microscopic    Collection Time: 01/14/21 12:25 AM   Result Value Ref Range    Clarity, UA Clear     Color, UA Yellow     Specific [place], UA 1 015 1 003 - 1 030    pH, UA 5 5 4 5, 5 0, 5 5, 6 0, 6 5, 7 0, 7 5, 8 0    Glucose, UA Negative Negative mg/dl    Ketones, UA Negative Negative mg/dl    Blood, UA Negative Negative    Protein, UA Negative Negative mg/dl    Nitrite, UA Positive (A) Negative    Bilirubin, UA Negative Negative    Urobilinogen, UA 0 2 0 2, 1 0 E U /dl E U /dl    Leukocytes, UA Negative Negative    WBC, UA 0-1 (A) None Seen, 2-4 /hpf    RBC, UA 0-1 (A) None Seen, 2-4 /hpf    Bacteria, UA None Seen None Seen, Occasional /hpf Epithelial Cells Occasional None Seen, Occasional /hpf   Urine culture    Collection Time: 01/14/21 12:25 AM    Specimen: Urine, Straight Cath   Result Value Ref Range    Urine Culture No Growth <1000 cfu/mL    Procalcitonin Reflex    Collection Time: 01/14/21  6:07 AM   Result Value Ref Range    Procalcitonin 0 52 (H) <=0 25 ng/ml   Lactic acid, plasma    Collection Time: 01/14/21  6:07 AM   Result Value Ref Range    LACTIC ACID 1 0 0 5 - 2 0 mmol/L   CBC and differential    Collection Time: 01/14/21  2:11 PM   Result Value Ref Range    WBC 10 95 (H) 4 31 - 10 16 Thousand/uL    RBC 3 67 (L) 3 81 - 5 12 Million/uL    Hemoglobin 11 4 (L) 11 5 - 15 4 g/dL    Hematocrit 34 9 34 8 - 46 1 %    MCV 95 82 - 98 fL    MCH 31 1 26 8 - 34 3 pg    MCHC 32 7 31 4 - 37 4 g/dL    RDW 13 9 11 6 - 15 1 %    MPV 11 4 8 9 - 12 7 fL    Platelets 659 449 - 376 Thousands/uL    nRBC 0 /100 WBCs    Neutrophils Relative 82 (H) 43 - 75 %    Immat GRANS % 2 0 - 2 %    Lymphocytes Relative 8 (L) 14 - 44 %    Monocytes Relative 7 4 - 12 %    Eosinophils Relative 1 0 - 6 %    Basophils Relative 0 0 - 1 %    Neutrophils Absolute 9 05 (H) 1 85 - 7 62 Thousands/µL    Immature Grans Absolute 0 20 0 00 - 0 20 Thousand/uL    Lymphocytes Absolute 0 82 0 60 - 4 47 Thousands/µL    Monocytes Absolute 0 79 0 17 - 1 22 Thousand/µL    Eosinophils Absolute 0 06 0 00 - 0 61 Thousand/µL    Basophils Absolute 0 03 0 00 - 0 10 Thousands/µL     Imaging: I have personally reviewed pertinent reports  Tele- nsr      Counseling / Coordination of Care  Total time spent today 30 minutes  Greater than 50% of total time was spent with the patient and / or family counseling and / or coordination of care

## 2021-01-15 NOTE — PROGRESS NOTES
Progress Note - Aleshia Barrera 1940, 79 y o  female MRN: [id number]    Unit/Bed#: S -01 Encounter: [id number]    Primary Care Provider: Linwood Huggins MD   Date and time admitted to hospital: 1/7/2021  7:41 PM        * Acute combined systolic and diastolic congestive heart failure ([address] )  Assessment & Plan  Wt Readings from Last 3 Encounters:   01/15/21 65 9 kg (145 lb 3 2 oz)   01/05/21 63 1 kg (139 lb 3 2 oz)   12/23/20 62 4 kg (137 lb 9 1 oz)        Presented with increased shortness of breath, dyspnea on exertion and lower extremity edema  70/4 ECHO: Systolic function was mildly reduced, Ejection fraction was estimated to be 45 %  There was mild diffuse hypokinesis (grade 1 diastolic dysfunction)  Lab Results   Component Value Date    NTBNP 1,254 (H) 01/07/2021    NTBNP 958 (H) 12/02/2020     - continue lasix with hold parameters  - Continue to monitor urine output  - Daily weights  - Cardiology following        Suprapubic pain  Assessment & Plan  Pain in suprapubic region on palpation during physical exam  Bladder scan continue to reveal retention with the need for straight cath  Some pain diffusely  CT scan 1/13/2021 revealed colitis  - Patient encouraged to use commode, only small amount of urine expelled  - Straight cath as needed  - May require dominguez  - Urine culture showed no growth    Respiratory distress  Assessment & Plan  Patient requiring 2L o2 via NC  States she had vomited 1/11/2021 which may have caused an aspiration   CXR concerning for RLL pneumonia but is improving      - d/c rocephin and flagyl  - Monitor vitals      Elevated troponin  Assessment & Plan  Latest troponin 3 29 (1/11/2021) after patient stated she had abdominal/chest pain    - Cardiology on board  - Cardiac cath completed, stent placed Left main  - continue heparin    Pulmonary edema cardiac cause [facility])  Assessment & Plan  Wt Readings from Last 3 Encounters:   01/15/21 65 9 kg (145 lb 3 2 oz)   01/05/21 63 1 kg (139 lb 3 2 oz)   20 62 4 kg (137 lb 9 1 oz)     · Cardiology following   · Continue Lasix 40mg PO   · Patient on 2L o2  · CXR revealed RLL infiltrate which is now improving  · Xopenex ordered PRN  · Continue to monitor    Chest pain  Assessment & Plan  Improved  Initial chest pain located in the right radiating to both arms  Pain was described as squeezing, tearing and rated 10/10  Alleviating factors: Nitroglycerin and rest  Worsened by: deep inspiration, emotional stress and walking    No results for input(s): TROPONINI in the last 72 hours  Plan:   - Cardiac cath completed 2021  Stent placed left main  Signs of mesenteric ischemia noted  - Continue heparin and lasix as per cardiology    Hyperlipidemia  Assessment & Plan  Continue Lipitor    CAD (coronary artery disease)  Assessment & Plan  · Continue Lipitor  · Isosorbide mononitrate 90 mg daily  · Metoprolol 24 hour tablet increased to 75 mg daily as per cardiology  · Effient 10 mg every other day, this was confirmed with prescription  · Ranexa 1000 mg b i d           VTE Pharmacologic Prophylaxis:   Pharmacologic: Heparin  Mechanical VTE Prophylaxis in Place: Yes    Discussions with Specialists or Other Care Team Provider: Cardiology    Education and Discussions with Family / Patient: Daughter and     Current Length of Stay: 8 day(s)    Current Patient Status: Inpatient     Discharge Plan / Estimated Discharge Date: TBD    Code Status: Level 1 - Full Code      Subjective:   Patient was seen sitting in her chair comfortably  She seemed quite today  She denies any complaints  Nursing staff reported >600cc urine retained on bladder scan  Patient had straight cath done  Anjelica colored urine expelled       Objective:     Vitals:   Temp (24hrs), Av 1 °F (36 7 °C), Min:97 5 °F (36 4 °C), Max:98 8 °F (37 1 °C)    Temp:  [97 5 °F (36 4 °C)-98 8 °F (37 1 °C)] 97 5 °F (36 4 °C)  HR:  [63-83] 75  Resp:  [16] 16  BP: ()/(50-64) 119/58  SpO2:  [94 %-97 %] 96 %  Body mass index is 28 36 kg/m²  Input and Output Summary (last 24 hours): Intake/Output Summary (Last 24 hours) at 1/15/2021 1157  Last data filed at 1/15/2021 1101  Gross per 24 hour   Intake 200 ml   Output 650 ml   Net -450 ml       Physical Exam:     Physical Exam  Constitutional:       General: She is not in acute distress  Appearance: She is well-developed  She is not diaphoretic  HENT:      Head: Normocephalic and atraumatic  Eyes:      General: No scleral icterus  Right eye: No discharge  Left eye: No discharge  Conjunctiva/sclera: Conjunctivae normal    Cardiovascular:      Rate and Rhythm: Normal rate and regular rhythm  Heart sounds: Normal heart sounds  No murmur  Pulmonary:      Effort: No respiratory distress  Breath sounds: No wheezing  Abdominal:      Palpations: Abdomen is soft  Tenderness: There is abdominal tenderness  Comments: Suprapubic tenderness     Musculoskeletal: Normal range of motion  General: No tenderness  Lymphadenopathy:      Cervical: No cervical adenopathy  Skin:     General: Skin is warm and dry  Coloration: Skin is not pale  Findings: No erythema  Neurological:      Mental Status: She is alert  Additional Data:     Labs:    Results from last 7 days   Lab Units 01/14/21  1411   WBC Thousand/uL 10 95*   HEMOGLOBIN g/dL 11 4*   HEMATOCRIT % 34 9   PLATELETS Thousands/uL 218   NEUTROS PCT % 82*   LYMPHS PCT % 8*   MONOS PCT % 7   EOS PCT % 1     Results from last 7 days   Lab Units 01/15/21  1032 01/13/21  0647   POTASSIUM mmol/L 3 2* 3 8   CHLORIDE mmol/L 102 99*   CO2 mmol/L 25 23   BUN mg/dL 20 23   CREATININE mg/dL 1 13 1 04   CALCIUM mg/dL 8 2* 8 6   ALK PHOS U/L  --  68   ALT U/L  --  39   AST U/L  --  120*     Results from last 7 days   Lab Units 01/11/21  1248   INR  0 91       * I Have Reviewed All Lab Data Listed Above    * Additional Pertinent Lab Tests Reviewed: All Labs Within Last 24 Hours Reviewed    Imaging:    Imaging Reports Reviewed Today Include: N/A  Imaging Personally Reviewed by Myself Includes:  N/A    Recent Cultures (last 7 days):     Results from last 7 days   Lab Units 01/14/21  0025 01/13/21  1518 01/13/21  1517   BLOOD CULTURE   --  No Growth at 24 hrs  No Growth at 24 hrs     URINE CULTURE  No Growth <1000 cfu/mL  --   --        Last 24 Hours Medication List:   Current Facility-Administered Medications   Medication Dose Route Frequency Provider Last Rate    acetaminophen  650 mg Oral Q4H PRN Lukas Hwang MD      aluminum-magnesium hydroxide-simethicone  30 mL Oral Q4H PRN KATHY Adkins      aspirin  81 mg Oral Daily KATHY Ivey      atorvastatin  20 mg Oral Daily With Rayo Ortiz MD      Diclofenac Sodium  2 g Topical 4x Daily Lukas Hwang MD      fentanyl citrate (PF)  50 mcg Intravenous Once Be Beth PA-C      furosemide  40 mg Oral Daily KATHY Adkins      heparin (porcine)  5,000 Units Subcutaneous Q8H [address]      levalbuterol  1 25 mg Nebulization Q6H PRN Coreen Lindsey DO      loperamide  2 mg Oral TID PRN Kitty Wick MD      losartan  25 mg Oral Daily KATHY Adkins      metoprolol succinate  50 mg Oral Daily KATHY Adkins      morphine injection  1 mg Intravenous Q4H PRN Lukas Hwang MD      morphine injection  1 mg Intravenous Once KATHY Horowitz      morphine injection  1 mg Intravenous Once KATHY Adkins      nitroglycerin  0 4 mg Sublingual Q5 Min PRN Lukas Hwang MD      ondansetron  4 mg Intravenous Once Meghan Oswald PA-C      ondansetron  4 mg Intravenous Q6H PRN Lukas Hwang MD      oxyCODONE  2 5 mg Oral Q4H PRN Lukas Hwang MD      pantoprazole  40 mg Oral Q12H Nikki Miller MD      polyethylene glycol  17 g Oral Daily PRN Claudette Siren Juliane Clifford MD      prasugrel  10 mg Oral Daily KATHY Thapa      ranolazine  1,000 mg Oral BID Rina Gray MD      saccharomyces boulardii  250 mg Oral BID Isabel Wheatley MD      senna  8 6 mg Oral HS PRN Isabel Wheatley MD      simethicone  80 mg Oral 4x Daily PRN Rina Gray MD      sodium chloride  3 mL Nebulization Q6H PRN Shu Abdullahi DO      vancomycin  125 mg Oral Q6H Azar Lind MD          Today, Patient Was Seen By: Susana Becerra MD    ** Please Note: This note has been constructed using a voice recognition system   **

## 2021-01-15 NOTE — PHYSICAL THERAPY NOTE
PHYSICAL THERAPY TREATMENT NOTE    Patient Name: Danie CÁRDENAS Date: 1/15/2021     01/15/21 0820   PT Last Visit   PT Visit Date 01/15/21   Note Type   Note Type Treatment   Pain Assessment   Pain Assessment Tool Pain Assessment not indicated - pt denies pain   Restrictions/Precautions   Other Precautions Impulsive; Chair Alarm; Bed Alarm;Multiple lines;O2;Fall Risk   General   Chart Reviewed Yes   Family/Caregiver Present No   Cognition   Arousal/Participation Cooperative   Attention Attends with cues to redirect   Orientation Level Oriented to person;Oriented to place;Oriented to time;Disoriented to situation; Other (Comment)  (pt was identified w/ full name, birth date)   Following Commands Follows one step commands with increased time or repetition   Subjective   Subjective pt agreed to participate in PT intervention  Activity Tolerance   Activity Tolerance Patient limited by fatigue   Nurse Made Aware spoke to [facility]   Equipment Use   Comments ankle pumps 30  quad sets 20  heel slides and hip abduction 10 each  Assessment   Prognosis Fair   Problem List Decreased strength;Decreased endurance; Impaired balance;Decreased mobility; Decreased safety awareness   Assessment Pt was initiated w/ participation in exercise program to address physical and mobility deficits noted during eval  Pt had fair understanding of exercise technique after initial introduction and demonstration  Occasional rest breaks were needed due to fatigue  Handout was provided to expedite understanding of technique  continued inpatient PT is indicated to facilitate return to independent level of mobility and function  Goals   Patient Goals go home soon  STG Expiration Date 01/25/21   Short Term Goal #1 pt will:  Increase bilateral LE strength 1/2 grade to facilitate independent mobility, Perform all bed mobility tasks modified independent to decrease fall risk factors, Perform all transfers w/ supervision to improve independence, Ambulate 120 ft  with least restrictive assistive device w/ supervision w/o LOB to expedite safe return home w/ family support, Navigate 2 stairs w/ minx1 with unilateral handrail to facilitate return to previous living environment, Increase all balance 1 grade to decrease risk for falls, Complete exercise program independently to increase strength and endurance, Tolerate 3 hr OOB to faciliate upright tolerance, Improve Barthel Index score to 70 or greater to facilitate independence and Increase 30 second chair stand test score to 4 or greater to decrease risk for falls   PT Treatment Day 1   Plan   Treatment/Interventions Functional transfer training;LE strengthening/ROM; Elevations; Therapeutic exercise; Endurance training;Patient/family training;Equipment eval/education;Gait training;Bed mobility   Progress Progressing toward goals   PT Frequency 5x/wk   Recommendation   PT Discharge Recommendation Post-Acute Rehabilitation Services   Equipment Recommended Walker   Additional Comments Pt would benefit from OT consult to address safety awareness and ADL completion  Skilled inpatient PT recommended while in hospital to progress pt toward treatment goals      Ag Franco, PT

## 2021-01-15 NOTE — CASE MANAGEMENT
CM received VM from Patient's daughter, Wily Wick, requesting a return call to discuss the recommendation of STR upon dc  CM returned Jorge Alberto's call and she added the Patient's spouse, Jason Miller, to the call to have a 3-way call  CM explained that the recommendation is for STR  Patient's family states that they do not want the Patient to go to a SNF and they feel that they have enough family support to adequately meet the Patient's needs  Patient's family states that the Patient's son and his spouse, an RN, plan to come and stay with the Patient  Patient's daughter also states that she lives nearby and would be able to provide support as well  Patient's family states that the Patient has some short-term memory loss at baseline  Patient's family is requesting a referral to [address] for SN/PT/OT; family does not have a preference of agency  CM explained that a blanket referral would be completed to determine agencies with availability  Patient's family requested that CM contact the Patient's son Javon Nascimento at [phone], to explain the Patient's needs to determine if he felt comfortable caring for the Patient  CM spoke to the Patient's son, Javon Nascimento, and his wife, via phone  CM explained the recommendation and the Patient's functional status during PT  Patient's son and DIL feel comfortable assisting with the Patient's care upon dc and are requesting that the [place] [id number]  CM sent referral to [place] [id number] for SN/PT/OT via [address]  CM dept will continue to follow for acceptance

## 2021-01-15 NOTE — PLAN OF CARE
Problem: PHYSICAL THERAPY ADULT  Goal: Performs mobility at highest level of function for planned discharge setting  See evaluation for individualized goals  Description: Treatment/Interventions: Functional transfer training, LE strengthening/ROM, Elevations, Therapeutic exercise, Endurance training, Patient/family training, Equipment eval/education, Gait training, Bed mobility  Equipment Recommended: William Paget       See flowsheet documentation for full assessment, interventions and recommendations  Outcome: Progressing  Note: Prognosis: Fair  Problem List: Decreased strength, Decreased endurance, Impaired balance, Decreased mobility, Decreased safety awareness  Assessment: Pt was initiated w/ participation in exercise program to address physical and mobility deficits noted during eval  Pt had fair understanding of exercise technique after initial introduction and demonstration  Occasional rest breaks were needed due to fatigue  Handout was provided to expedite understanding of technique  continued inpatient PT is indicated to facilitate return to independent level of mobility and function  PT Discharge Recommendation: Post-Acute Rehabilitation Services          See flowsheet documentation for full assessment

## 2021-01-15 NOTE — ASSESSMENT & PLAN NOTE
Wt Readings from Last 3 Encounters:   01/15/21 65 9 kg (145 lb 3 2 oz)   01/05/21 63 1 kg (139 lb 3 2 oz)   12/23/20 62 4 kg (137 lb 9 1 oz)        Presented with increased shortness of breath, dyspnea on exertion and lower extremity edema  59/4 ECHO: Systolic function was mildly reduced, Ejection fraction was estimated to be 45 %  There was mild diffuse hypokinesis (grade 1 diastolic dysfunction)      Lab Results   Component Value Date    NTBNP 1,254 (H) 01/07/2021    NTBNP 958 (H) 12/02/2020     - continue lasix with hold parameters  - Continue to monitor urine output  - Daily weights  - Cardiology following

## 2021-01-16 PROBLEM — R53.1 GENERALIZED WEAKNESS: Status: ACTIVE | Noted: 2021-01-16

## 2021-01-16 PROBLEM — I21.4 NSTEMI (NON-ST ELEVATED MYOCARDIAL INFARCTION) (HCC): Status: ACTIVE | Noted: 2020-12-02

## 2021-01-16 PROBLEM — J69.0 ASPIRATION PNEUMONIA (HCC): Status: ACTIVE | Noted: 2021-01-16

## 2021-01-16 PROBLEM — R19.7 DIARRHEA: Status: ACTIVE | Noted: 2021-01-16

## 2021-01-16 LAB
ATRIAL RATE: 88 BPM
P AXIS: 64 DEGREES
PR INTERVAL: 142 MS
QRS AXIS: -6 DEGREES
QRSD INTERVAL: 144 MS
QT INTERVAL: 424 MS
QTC INTERVAL: 513 MS
T WAVE AXIS: 154 DEGREES
VENTRICULAR RATE: 88 BPM

## 2021-01-16 PROCEDURE — 93010 ELECTROCARDIOGRAM REPORT: CPT | Performed by: INTERNAL MEDICINE

## 2021-01-16 PROCEDURE — 97167 OT EVAL HIGH COMPLEX 60 MIN: CPT

## 2021-01-16 PROCEDURE — 92610 EVALUATE SWALLOWING FUNCTION: CPT

## 2021-01-16 PROCEDURE — 94760 N-INVAS EAR/PLS OXIMETRY 1: CPT

## 2021-01-16 PROCEDURE — 94640 AIRWAY INHALATION TREATMENT: CPT

## 2021-01-16 PROCEDURE — 99232 SBSQ HOSP IP/OBS MODERATE 35: CPT | Performed by: INTERNAL MEDICINE

## 2021-01-16 RX ADMIN — RANOLAZINE 1000 MG: 500 TABLET, FILM COATED, EXTENDED RELEASE ORAL at 10:22

## 2021-01-16 RX ADMIN — FUROSEMIDE 40 MG: 40 TABLET ORAL at 10:17

## 2021-01-16 RX ADMIN — HEPARIN SODIUM 5000 UNITS: 5000 INJECTION INTRAVENOUS; SUBCUTANEOUS at 22:03

## 2021-01-16 RX ADMIN — Medication 125 MG: at 22:04

## 2021-01-16 RX ADMIN — PANTOPRAZOLE SODIUM 40 MG: 40 TABLET, DELAYED RELEASE ORAL at 10:19

## 2021-01-16 RX ADMIN — LEVALBUTEROL HYDROCHLORIDE 1.25 MG: 1.25 SOLUTION, CONCENTRATE RESPIRATORY (INHALATION) at 12:05

## 2021-01-16 RX ADMIN — Medication 125 MG: at 10:21

## 2021-01-16 RX ADMIN — Medication 125 MG: at 17:50

## 2021-01-16 RX ADMIN — ATORVASTATIN CALCIUM 20 MG: 20 TABLET, FILM COATED ORAL at 17:46

## 2021-01-16 RX ADMIN — ISODIUM CHLORIDE 3 ML: 0.03 SOLUTION RESPIRATORY (INHALATION) at 12:05

## 2021-01-16 RX ADMIN — HEPARIN SODIUM 5000 UNITS: 5000 INJECTION INTRAVENOUS; SUBCUTANEOUS at 05:01

## 2021-01-16 RX ADMIN — Medication 250 MG: at 10:19

## 2021-01-16 RX ADMIN — LOSARTAN POTASSIUM 25 MG: 25 TABLET, FILM COATED ORAL at 10:18

## 2021-01-16 RX ADMIN — Medication 250 MG: at 17:46

## 2021-01-16 RX ADMIN — ASPIRIN 81 MG: 81 TABLET, COATED ORAL at 10:18

## 2021-01-16 RX ADMIN — RANOLAZINE 1000 MG: 500 TABLET, FILM COATED, EXTENDED RELEASE ORAL at 17:46

## 2021-01-16 RX ADMIN — PRASUGREL 10 MG: 10 TABLET, FILM COATED ORAL at 10:18

## 2021-01-16 RX ADMIN — PANTOPRAZOLE SODIUM 40 MG: 40 TABLET, DELAYED RELEASE ORAL at 22:03

## 2021-01-16 RX ADMIN — Medication 125 MG: at 05:01

## 2021-01-16 RX ADMIN — METOPROLOL SUCCINATE 50 MG: 50 TABLET, EXTENDED RELEASE ORAL at 10:18

## 2021-01-16 NOTE — OCCUPATIONAL THERAPY NOTE
Occupational Therapy Evaluation     Patient Name: Concepcion EL Date: 1/16/2021  Problem List  Principal Problem:    Acute combined systolic and diastolic congestive heart failure (HCC)  Active Problems:    CAD (coronary artery disease)    Hyperlipidemia    Chest pain    Pulmonary edema cardiac cause (HCC)    Elevated troponin    Respiratory distress    Suprapubic pain    Past Medical History  Past Medical History:   Diagnosis Date    Anal fissure     Cardiac disorder     Esophageal reflux     Esophagitis, reflux     Hemorrhoids     Hepatic hemangioma     Last Assessed: 1/13/2015    Herpes zoster     History of colonic polyps     Hypertension     [facility]schem[facility]     Lumbar herniated disc     [facility] without [facility] of [facility]     Nephrolithiasis     L  Lithotripsy    Nontoxic single thyroid nodule     Last Assessed: 1/13/2015    Osteoarthritis     Overactive bladder     Raynaud disease     Respiratory system disease     Sjogren's disease ([facility]     Spinal stenosis     PONCHO (stress urinary incontinence, female)     Uterovaginal prolapse     Grade I-II     Past Surgical History  Past Surgical History:   Procedure Laterality Date    APPENDECTOMY  1947    CARDIAC SURGERY      CABG    CATARACT EXTRACTION Bilateral     COLONOSCOPY  2012    Fiberoptic    COLONOSCOPY      Resolved: 2006 - 2012 5 year f/u    CORONARY ANGIOPLASTY WITH STENT PLACEMENT      CORONARY ARTERY BYPASS GRAFT      Resolved: 2012    ESOPHAGOGASTRODUODENOSCOPY  2012    Diagnostic    HEMORROIDECTOMY      KNEE SURGERY      LITHOTRIPSY      Renal    MALIGNANT SKIN LESION EXCISION      Face; Resolved: 2004    MA ESOPHAGOGASTRODUODENOSCOPY TRANSORAL DIAGNOSTIC N/A 4/13/2016    Procedure: EGD AND COLONOSCOPY;  Surgeon: Jon Lester MD;  [facility];   Service: Gastroenterology    RENAL ARTERY STENT      SKIN LESION EXCISION      Scalp    SOFT TISSUE TUMOR RESECTION Shoulder; Resolved: 1995    THROMBOLYSIS      Postoperative Thrombolysis PTCA    TONSILLECTOMY      Resolved: 1944 01/16/21 1305   OT Last Visit   OT Visit Date 01/16/21   Note Type   Note type Evaluation   Restrictions/Precautions   Other Precautions Chair Alarm; Bed Alarm; Fall Risk   Pain Assessment   Pain Assessment Tool Pain Assessment not indicated - pt denies pain   Home Living   Type of Home House  (2 LUDWIN)   Home Layout One level   Bathroom Shower/Tub Tub/shower unit   Bathroom Toilet Standard   Bathroom Equipment Shower chair;Grab bars in shower   Home Equipment Walker;Cane   Additional Comments Pt stated does not use AD   Prior Function   Level of [place] Independent with ADLs and functional mobility   Lives With Spouse   Receives Help From Family   ADL Assistance Independent   IADLs Independent   Falls in the last 6 months 0   ADL   Eating Assistance 7  Independent   Grooming Assistance 5  Supervision/Setup   UB Bathing Assistance 5  Supervision/Setup   LB Bathing Assistance 4  Minimal Assistance   UB Dressing Assistance 4  Minimal Assistance   LB Dressing Assistance 4  [address]  Unable to assess   Bed Mobility   Supine to Sit 3  Moderate assistance   Additional items Assist x 1; Increased time required;Verbal cues   Sit to Supine 3  Moderate assistance   Additional items Assist x 1; Increased time required;Verbal cues   Transfers   Sit to Stand 3  Moderate assistance   Additional items Assist x 1; Increased time required;Verbal cues   Stand to Sit 3  Moderate assistance   Additional items Assist x 1; Increased time required;Verbal cues   Additional Comments Pt SP0 2 decreased to 89 % on transfer  Nursing made aware  Pt was given breathing technique and SP02 increased to 94 %  Patient stated that felt weak and was put back to bed     Functional Mobility   Additional Comments Unable to assess pt was fatigue   Balance   Static Sitting Fair -   Dynamic Sitting Fair -   Static Standing Poor +   Dynamic Standing Poor +   Activity Tolerance   Activity Tolerance Patient limited by fatigue   Nurse Made Aware OMER Luis   RUE Assessment   RUE Assessment WFL   RUE Strength   RUE Overall Strength   (3/5)   LUE Assessment   LUE Assessment WFL   LUE Strength   LUE Overall Strength   (3/5)   Cognition   Orientation Level Oriented X4   Following Commands Follows one step commands with increased time or repetition   Comments Pt ID by wristband name and    Assessment   Limitation Decreased ADL status; Decreased UE strength;Decreased Safe judgement during ADL;Decreased endurance;Decreased self-care trans;Decreased high-level ADLs   Prognosis Fair   Assessment Patient evaluated by Occupational Therapy  Patient admitted with Acute combined systolic and diastolic congestive heart failure ([facility] )  The patients occupational profile, medical and therapy history includes a expanded review of medical and/or therapy records and additional review of physical, cognitive, or psychosocial history related to current functional performance  Comorbidities affecting functional mobility and ADLS include: arthritis and hypertension  Prior to admission, patient was independent with functional mobility without assistive device, independent with ADLS, independent with IADLS and living with spouse in a 1 level home with 2 steps to enter  The evaluation identifies the following performance deficits: weakness, impaired balance, decreased endurance, decreased coordination, increased fall risk, new onset of impairment of functional mobility, decreased ADLS, decreased IADLS, decreased activity tolerance, decreased safety awareness and decreased strength, that result in activity limitations and/or participation restrictions   This evaluation requires clinical decision making of high complexity, because the patient presents with comorbidites that affect occupational performance and required significant modification of tasks or assistance with consideration of multiple treatment options  The Barthel Index was used as a functional outcome tool presenting with a score of 40 Patients raw score on the AM-PAC Daily Activity inpatient short form is less than 39 4  Patient will benefit from skilled Occupational Therapy services to address above deficits and facilitate a safe return to prior level of function  Goals   Patient Goals "get better"   STG Time Frame   (1-7)   Short Term Goal #1   Patient will increase standing tolerance to 5 minutes during ADL task to decrease assistance level and decrease fall risk; Patient will increase bed mobility to min assist in preparation for ADLS and transfers; Patient will increase functional mobility to and from bathroom with rolling walker with min assist to increase performance with ADLS and to use a toilet; Patient will tolerate 10 minutes of UE ROM/strengthening to increase general activity tolerance and performance in ADLS/IADLS; Patient will improve functional activity tolerance to 10 minutes of sustained functional tasks to increase participation in basic self-care and decrease assistance level;  Patient will be able to to verbalize understanding and perform energy conservation/proper body mechanics during ADLS and functional mobility at least 75% of the time with minimal cueing to decrease signs of fatigue and increase stamina to return to prior level of function;  Patient will increase dynamic standing balance to fair to improve postural stability and decrease fall risk during standing ADLS and transfers  LTG Time Frame   (8-14)   Long Term Goal #1   Patient will increase standing tolerance to 7 minutes during ADL task to decrease assistance level and decrease fall risk; Patient will increase bed mobility to sup in preparation for ADLS and transfers;  Patient will increase functional mobility to and from bathroom with rolling walker sup to increase performance with ADLS and to use a toilet; Patient will tolerate 12minutes of UE ROM/strengthening to increase general activity tolerance and performance in ADLS/IADLS; Patient will improve functional activity tolerance to 12 minutes of sustained functional tasks to increase participation in basic self-care and decrease assistance level;  Patient will be able to to verbalize understanding and perform energy conservation/proper body mechanics during ADLS and functional mobility at least 90%% of the time with no cueing to decrease signs of fatigue and increase stamina to return to prior level of function;  Patient will increase dynamic standing balance to fair+ to improve postural stability and decrease fall risk during standing ADLS and transfers  Functional Transfer Goals   Pt Will Transfer To Bedside Commode With stand by assist   Pt Will Transfer To Toilet With mod indep   Pt Will Transfer To Shower With mod indep   ADL Goals   Pt Will Perform Eating Independently   Pt Will Perform Grooming With mod indep   Pt Will Perform Bathing With stand by assist   Pt Will Perform UE Dressing With mod indep   Pt Will Perform LE Dressing With stand by assist   Pt Will Perform Toileting With stand by assist   Plan   Treatment Interventions ADL retraining;Functional transfer training; Endurance training;Neuromuscular reeducation;Patient/family training;Continued evaluation; Activityengagement; Energy conservation   Goal Expiration Date 01/30/21   OT Frequency 3-5x/wk   Recommendation   OT Discharge Recommendation Post-Acute Rehabilitation Services   AM-PAC Daily Activity Inpatient   Lower Body Dressing 3   Bathing 2   Toileting 2   Upper Body Dressing 3   Grooming 3   Eating 4   Daily Activity Raw Score 17   Daily Activity Standardized Score (Calc for Raw Score >=11) 37 26   Barthel Index   Feeding 10   Bathing 0   Grooming Score 0   Dressing Score 5   Bladder Score 10   Bowels Score 10   Toilet Use Score 5   Transfers (Bed/Chair) Score 0 Mobility (Level Surface) Score 0   Stairs Score 0   Barthel Index Score 40   Yonathan Romero, OT

## 2021-01-16 NOTE — PROGRESS NOTES
Progress Note - López Bailey 1940, 79 y o  female MRN: [id number]    Unit/Bed#: S -01 Encounter: [id number]    Primary Care Provider: Celena Manrique MD   Date and time admitted to hospital: 1/7/2021  7:41 PM        * Acute combined systolic and diastolic congestive heart failure ([address] )  Assessment & Plan  Wt Readings from Last 3 Encounters:   01/16/21 65 3 kg (143 lb 15 4 oz)   01/05/21 63 1 kg (139 lb 3 2 oz)   12/23/20 62 4 kg (137 lb 9 1 oz)        Presented with increased shortness of breath, dyspnea on exertion and lower extremity edema  35/0 ECHO: Systolic function was mildly reduced, Ejection fraction was estimated to be 45 %  There was mild diffuse hypokinesis (grade 1 diastolic dysfunction)  Lab Results   Component Value Date    NTBNP 1,254 (H) 01/07/2021    NTBNP 958 (H) 12/02/2020     - continue lasix with hold parameters  - Continue to monitor urine output  - Daily weights  - Cardiology following        NSTEMI (non-ST elevated myocardial infarction) [facility])  Assessment & Plan  Type I  Cardiac cath completed 1/11/2021  Stent placed left main  · Continue Lipitor  · Isosorbide mononitrate /Metoprolol/Aspirin and Effient /Ranexa       Diarrhea  Assessment & Plan  Check stool for C diff  Continue vancomycin p o  While waiting for results    Aspiration pneumonia [facility])  Assessment & Plan  Completed course of antibiotics ceftriaxone/Flagyl  Respiratory status improved  O2 sats stable on room air    Generalized weakness  Assessment & Plan  Patient now realize that  needs rehab and is agreeable at this time      VTE Pharmacologic Prophylaxis:   Pharmacologic: Heparin  Mechanical VTE Prophylaxis in Place: Yes    Patient Centered Rounds:     Discussions with Specialists or Other Care Team Provider:     Education and Discussions with Family / Patient: Spoke with Pt's family    Time Spent for Care: 20 minutes    More than 50% of total time spent on counseling and coordination of care as described above     Current Length of Stay: 9 day(s)    Current Patient Status: Inpatient   Certification Statement: The patient will continue to require additional inpatient hospital stay due to above     Discharge Plan:     Code Status: Level 1 - Full Code      Subjective:   Pt appears weak and tired  Reports 3-4 loose stools today  Denies abd pain, nausea and vomiting    Objective:     Vitals:   Temp (24hrs), Av °F (36 7 °C), Min:97 6 °F (36 4 °C), Max:98 3 °F (36 8 °C)    Temp:  [97 6 °F (36 4 °C)-98 3 °F (36 8 °C)] 97 6 °F (36 4 °C)  HR:  [76-83] 76  Resp:  [16-18] 18  BP: ()/(51-63) 87/51  SpO2:  [90 %-95 %] 92 %  Body mass index is 28 12 kg/m²  Input and Output Summary (last 24 hours): Intake/Output Summary (Last 24 hours) at 2021 1646  Last data filed at 2021 0330  Gross per 24 hour   Intake 0 ml   Output 251 ml   Net -251 ml       Physical Exam:     Physical Exam  Constitutional:       General: She is not in acute distress  Appearance: She is ill-appearing  She is not toxic-appearing or diaphoretic  Eyes:      General:         Right eye: No discharge  Left eye: No discharge  Cardiovascular:      Rate and Rhythm: Normal rate and regular rhythm  Pulses: Normal pulses  Pulmonary:      Effort: Pulmonary effort is normal  No respiratory distress  Breath sounds: Normal breath sounds  No wheezing  Abdominal:      General: Abdomen is flat  Bowel sounds are normal  There is no distension  Palpations: Abdomen is soft  Musculoskeletal:         General: No tenderness  Skin:     General: Skin is warm  Coloration: Skin is not jaundiced  Findings: No bruising  Psychiatric:         Mood and Affect: Mood is depressed           Behavior: Behavior normal          Additional Data:     Labs:    Results from last 7 days   Lab Units 21  1411   WBC Thousand/uL 10 95*   HEMOGLOBIN g/dL 11 4*   HEMATOCRIT % 34 9   PLATELETS Thousands/uL 218   NEUTROS PCT % 82*   LYMPHS PCT % 8*   MONOS PCT % 7   EOS PCT % 1     Results from last 7 days   Lab Units 01/15/21  1032 01/13/21  0647   POTASSIUM mmol/L 3 2* 3 8   CHLORIDE mmol/L 102 99*   CO2 mmol/L 25 23   BUN mg/dL 20 23   CREATININE mg/dL 1 13 1 04   CALCIUM mg/dL 8 2* 8 6   ALK PHOS U/L  --  68   ALT U/L  --  39   AST U/L  --  120*     Results from last 7 days   Lab Units 01/11/21  1248   INR  0 91         Recent Cultures (last 7 days):     Results from last 7 days   Lab Units 01/14/21  0025 01/13/21  1518 01/13/21  1517   BLOOD CULTURE   --  No Growth at 48 hrs  No Growth at 48 hrs     URINE CULTURE  No Growth <1000 cfu/mL  --   --        Last 24 Hours Medication List:   Current Facility-Administered Medications   Medication Dose Route Frequency Provider Last Rate    acetaminophen  650 mg Oral Q4H PRN Layne Billings MD      aluminum-magnesium hydroxide-simethicone  30 mL Oral Q4H PRN KATHY Ruano      aspirin  81 mg Oral Daily KATHY Moyer      atorvastatin  20 mg Oral Daily With Laura Moon MD      Diclofenac Sodium  2 g Topical 4x Daily Layne Billings MD      fentanyl citrate (PF)  50 mcg Intravenous Once Lord Tiffanie PA-C      furosemide  40 mg Oral Daily KATHY Ruano      heparin (porcine)  5,000 Units Subcutaneous Q8H [address]      levalbuterol  1 25 mg Nebulization Q6H PRN Nasario Apley, DO      loperamide  2 mg Oral TID PRN Derek Knott MD      losartan  25 mg Oral Daily Mirian KATHY Perea      metoprolol succinate  50 mg Oral Daily Mirian KATHY Perea      morphine injection  1 mg Intravenous Q4H PRN Layne Billings MD      morphine injection  1 mg Intravenous Once KATHY Ruano      morphine injection  1 mg Intravenous Once KATHY Ruano      nitroglycerin  0 4 mg Sublingual Q5 Min PRN Layne Billings MD      ondansetron  4 mg Intravenous Once Lord Tiffanie PA-C      ondansetron  4 mg Intravenous Q6H PRN Layne Billings MD      oxyCODONE  2 5 mg Oral Q4H PRN Layne Billings MD      pantoprazole  40 mg Oral Q12H Chyna Zhong MD      polyethylene glycol  17 g Oral Daily PRN Ligia Delvalle MD      prasugrel  10 mg Oral Daily KATHY Ruano      ranolazine  1,000 mg Oral BID Layne Billings MD      saccharomyces boulardii  250 mg Oral BID Ligia Delvalle MD      senna  8 6 mg Oral HS PRN Ligia Delvlale MD      simethicone  80 mg Oral 4x Daily PRN Layne Billings MD      sodium chloride  3 mL Nebulization Q6H PRN Allan Apley, DO      vancomycin  125 mg Oral Q6H Leatha Aburto MD          Today, Patient Was Seen By: Ligia Delvalle MD    ** Please Note: Dictation voice to text software may have been used in the creation of this document   **

## 2021-01-16 NOTE — ASSESSMENT & PLAN NOTE
Completed course of antibiotics ceftriaxone/Flagyl  Respiratory status improved  O2 sats stable on room air

## 2021-01-16 NOTE — SPEECH THERAPY NOTE
Speech-Language Pathology Bedside Swallow Evaluation        Patient Name: Carlota Mcgarry    AEODR'B Date: 1/16/2021     Problem List  Principal Problem:    Acute combined systolic and diastolic congestive heart failure (HCC)  Active Problems:    CAD (coronary artery disease)    Hyperlipidemia    Chest pain    Pulmonary edema cardiac cause (HCC)    Elevated troponin    Respiratory distress    Suprapubic pain         Past Medical History  Past Medical History:   Diagnosis Date    Anal fissure     Cardiac disorder     Esophageal reflux     Esophagitis, reflux     Hemorrhoids     Hepatic hemangioma     Last Assessed: 1/13/2015    Herpes zoster     History of colonic polyps     Hypertension     Ischemic [facility]     Lumbar herniated disc     Malignant neoplasm without specification of site ([facility] )     Nephrolithiasis     L  Lithotripsy    Nontoxic single thyroid nodule     Last Assessed: 1/13/2015    Osteoarthritis     Overactive bladder     Raynaud disease     Respiratory system disease     Sjogren's disease ([facility]     Spinal stenosis     PONCHO (stress urinary incontinence, female)     Uterovaginal prolapse     Grade I-II       Past Surgical History  Past Surgical History:   Procedure Laterality Date    APPENDECTOMY  1947    CARDIAC SURGERY      CABG    CATARACT EXTRACTION Bilateral     COLONOSCOPY  2012    Fiberoptic    COLONOSCOPY      Resolved: 2006 - 2012 5 year f/u    CORONARY ANGIOPLASTY WITH STENT PLACEMENT      CORONARY ARTERY BYPASS GRAFT      Resolved: 2012    ESOPHAGOGASTRODUODENOSCOPY  2012    Diagnostic    HEMORROIDECTOMY      KNEE SURGERY      LITHOTRIPSY      Renal    MALIGNANT SKIN LESION EXCISION      Face; Resolved: 2004    [place] ESOPHAGOGASTRODUODENOSCOPY TRANSORAL DIAGNOSTIC N/A 4/13/2016    Procedure: EGD AND COLONOSCOPY;  Surgeon: Andrew Schmidt MD;  [facility];   Service: Gastroenterology    RENAL ARTERY STENT      SKIN LESION EXCISION      Scalp    SOFT TISSUE TUMOR RESECTION      Shoulder; Resolved: 1995    THROMBOLYSIS      Postoperative Thrombolysis PTCA    TONSILLECTOMY      Resolved: 1944       Summary    Pt presents with mild oral dysphagia and suspected mild pharyngeal dysphagia  Patient had mild prolonged mastication with mild diffuse oral residue on lingual surface after swallowing dry solid  Suspected mild swallow delay  No overt s/s of aspiration  Noted that patient had whitish spots on lingual surface  SLP reported this to RN and physician     Recommendations:   Diet: soft/level 3 diet and thin liquids   Meds: crushed with puree   Frequent Oral care: 4x/day  Aspiration precautions and compensatory swallowing strategies: upright posture, only feed when fully alert, slow rate of feeding, small bites/sips and alternating bites and sips  Other Recommendations/ considerations: Speech Pathology to follow to ensure safety at diet level; Therapeutic feeding with increased textures and adv diet based upon clinical performance       Current Medical Status  Pt is a 79 y o  female who presented to [address] [place] [address] with chest pain with SOB  Past medical history with CAD s/p CABG and LAMINE, HTN, HLD, and cognitive changes      Patient was seen in [facility] and was admitted from 12/2-12/5/2020  for CP/angina and suspected Type 1 NSTEMI which was managed medically with nitro and heparin GTT and adjusting antianginal regimen, was placed on Lasix for acute pulmonary edema      Patient was again seen on 12/23/20, for chest pain, ECG on admission revealed LBBB  Trops were trended 0 07, 0 65, and was discharged the same day  Cardiology saw patient and recommended increase her amlodipine from 5mg to 10mg and start PPI   Close follow up with her PCP and Cardiologist recommended       From discharge was having persistent Chest pain around three times a  day, usually happens with activity but happens during eating and would sometime wake her up in the middle of the night while sleeping, would take  Nitroglycerin x2 and pain will be relieved in 20 minutes  Patient report that they were using it so many times that they ran out    5pm today had chest pain unrelieved by two nitroglycerin describe as squeezing or pulling her chest apart, 10/10, worst with activity, doesn't seem to radiate worsen with breathing and accompanied with SOB, patient told us it has been months since she was able to lay flat  Patient also has some leg swelling  According to the  patient is compliant with all her medication, unfortunately the diet not so much, since he is not a great cook, he does not keep an eye on sodium intake  Patient was taken in ER  While patient was in the ER was laid flat, started have significant shortness of breath  Was placed on BiPAP per H&P on 1/8/21    Past medical history:   Please see H&P for details    Special Studies:  1/13 CT ABD: Nonspecific enteritis/colitis, likely infectious  No bowel obstruction or bowel pneumatosis    Right lower lobe consolidation has resolved  1/11 CT ABD: Right lower lobe pneumonia  Small bilateral effusions    Gallstone without surrounding inflammation  1/11 Chest XRAY: 1  Increasing interstitial opacities in the right lung  Pneumonia versus worsening edema  2    Small bilateral pleural effusions, increasing    Social/Education/Vocational Hx:  Pt lives with family    Swallow Information   Current Risks for Dysphagia & Aspiration: baseline hx of cognitive deficits  Current Symptoms/Concerns: Elva RN reports patient was vomiting and choking yesterday  Current Diet: NPO except medications   Baseline Diet: regular diet and thin liquids    Baseline Assessment   Behavior/Cognition: alert  Speech/Language Status: able to participate in basic conversation and able to follow commands  Patient Positioning: upright in bed     Swallow Mechanism Exam   Facial: symmetrical  Labial: WFL  Lingual: WFL  Velum: symmetrical  Mandible: adequate ROM  Dentition: adequate  Vocal quality:clear/adequate   Volitional Cough: Did not elicit    Respiratory: room air    Consistencies Assessed and Performance   Consistencies Administered: thin liquids, puree and hard solids    Oral Stage:   Mild prolonged mastication of regular textures with mild diffuse oral residue on lingual surface after swallowing dry solid    Pharyngeal Stage:   Suspected mild swallow delay   No overt s/s of aspiration    Esophageal Concerns: none reported      Results Reviewed with: patient, RN and MD   Dysphagia Goals: pt will tolerate Dysphagia III with thin liquids without s/s of aspiration xacross all meals  Speech Therapy Prognosis   Prognosis: good    Prognosis Considerations: therapeutic potential    Ebony Bernal MA CCC-SLP  Available on [facility]

## 2021-01-16 NOTE — DISCHARGE INSTR - DIET
Recommend to initiate Dysphagia III and Thin Liquids;  Aspiration Precautions; Meds crushed as able in puree

## 2021-01-16 NOTE — PLAN OF CARE
Problem: OCCUPATIONAL THERAPY ADULT  Goal: Performs self-care activities at highest level of function for planned discharge setting  See evaluation for individualized goals  Description: Treatment Interventions: ADL retraining, Functional transfer training, Endurance training, Neuromuscular reeducation, Patient/family training, Continued evaluation, Activityengagement, Energy conservation          See flowsheet documentation for full assessment, interventions and recommendations  Note: Limitation: Decreased ADL status, Decreased UE strength, Decreased Safe judgement during ADL, Decreased endurance, Decreased self-care trans, Decreased high-level ADLs  Prognosis: Fair  Assessment: Patient evaluated by Occupational Therapy  Patient admitted with Acute combined systolic and diastolic congestive heart failure ([facility] [address] )  The patients occupational profile, medical and therapy history includes a expanded review of medical and/or therapy records and additional review of physical, cognitive, or psychosocial history related to current functional performance  Comorbidities affecting functional mobility and ADLS include: arthritis and hypertension  Prior to admission, patient was independent with functional mobility without assistive device, independent with ADLS, independent with IADLS and living with spouse in a 1 level home with 2 steps to enter  The evaluation identifies the following performance deficits: weakness, impaired balance, decreased endurance, decreased coordination, increased fall risk, new onset of impairment of functional mobility, decreased ADLS, decreased IADLS, decreased activity tolerance, decreased safety awareness and decreased strength, that result in activity limitations and/or participation restrictions   This evaluation requires clinical decision making of high complexity, because the patient presents with comorbidites that affect occupational performance and required significant modification of tasks or assistance with consideration of multiple treatment options  The Barthel Index was used as a functional outcome tool presenting with a score of 40 Patients raw score on the AM-PAC Daily Activity inpatient short form is less than 39 4  Patient will benefit from skilled Occupational Therapy services to address above deficits and facilitate a safe return to prior level of function       OT Discharge Recommendation: Post-Acute Rehabilitation Services

## 2021-01-16 NOTE — ASSESSMENT & PLAN NOTE
Type I  Cardiac cath completed 1/11/2021  Stent placed left main     · Continue Lipitor  · Isosorbide mononitrate /Metoprolol/Aspirin and Nicolette Tsai

## 2021-01-16 NOTE — ASSESSMENT & PLAN NOTE
Wt Readings from Last 3 Encounters:   01/16/21 65 3 kg (143 lb 15 4 oz)   01/05/21 63 1 kg (139 lb 3 2 oz)   12/23/20 62 4 kg (137 lb 9 1 oz)        Presented with increased shortness of breath, dyspnea on exertion and lower extremity edema  39/5 ECHO: Systolic function was mildly reduced, Ejection fraction was estimated to be 45 %  There was mild diffuse hypokinesis (grade 1 diastolic dysfunction)      Lab Results   Component Value Date    NTBNP 1,254 (H) 01/07/2021    NTBNP 958 (H) 12/02/2020     - continue lasix with hold parameters  - Continue to monitor urine output  - Daily weights  - Cardiology following

## 2021-01-17 ENCOUNTER — HOSPITAL ENCOUNTER (INPATIENT)
Facility: HOSPITAL | Age: 81
LOS: 4 days | DRG: 242 | End: 2021-01-21
Attending: INTERNAL MEDICINE | Admitting: INTERNAL MEDICINE
Payer: MEDICARE

## 2021-01-17 VITALS
BODY MASS INDEX: 28.26 KG/M2 | TEMPERATURE: 97.6 F | WEIGHT: 143.96 LBS | DIASTOLIC BLOOD PRESSURE: 60 MMHG | SYSTOLIC BLOOD PRESSURE: 126 MMHG | OXYGEN SATURATION: 96 % | HEIGHT: 60 IN | HEART RATE: 77 BPM | RESPIRATION RATE: 16 BRPM

## 2021-01-17 DIAGNOSIS — Z45.018: ICD-10-CM

## 2021-01-17 DIAGNOSIS — I48.91 ATRIAL FIBRILLATION WITH RAPID VENTRICULAR RESPONSE (HCC): Primary | ICD-10-CM

## 2021-01-17 PROBLEM — R19.7 DIARRHEA: Status: RESOLVED | Noted: 2021-01-16 | Resolved: 2021-01-17

## 2021-01-17 PROBLEM — I25.119 CORONARY ARTERY DISEASE INVOLVING NATIVE CORONARY ARTERY OF NATIVE HEART WITH ANGINA PECTORIS (HCC): Status: ACTIVE | Noted: 2021-01-17

## 2021-01-17 LAB
ALBUMIN SERPL BCP-MCNC: 2.7 G/DL (ref 3.5–5)
ALP SERPL-CCNC: 55 U/L (ref 46–116)
ALT SERPL W P-5'-P-CCNC: 48 U/L (ref 12–78)
ANION GAP SERPL CALCULATED.3IONS-SCNC: 11 MMOL/L (ref 4–13)
AST SERPL W P-5'-P-CCNC: 68 U/L (ref 5–45)
ATRIAL RATE: 156 BPM
BASOPHILS # BLD MANUAL: 0 THOUSAND/UL (ref 0–0.1)
BASOPHILS NFR MAR MANUAL: 0 % (ref 0–1)
BILIRUB DIRECT SERPL-MCNC: 0.19 MG/DL (ref 0–0.2)
BILIRUB SERPL-MCNC: 0.65 MG/DL (ref 0.2–1)
BUN SERPL-MCNC: 14 MG/DL (ref 5–25)
CALCIUM SERPL-MCNC: 8.6 MG/DL (ref 8.3–10.1)
CHLORIDE SERPL-SCNC: 102 MMOL/L (ref 100–108)
CO2 SERPL-SCNC: 24 MMOL/L (ref 21–32)
CREAT SERPL-MCNC: 0.99 MG/DL (ref 0.6–1.3)
EOSINOPHIL # BLD MANUAL: 0.09 THOUSAND/UL (ref 0–0.4)
EOSINOPHIL NFR BLD MANUAL: 1 % (ref 0–6)
ERYTHROCYTE [DISTWIDTH] IN BLOOD BY AUTOMATED COUNT: 14.2 % (ref 11.6–15.1)
GFR SERPL CREATININE-BSD FRML MDRD: 54 ML/MIN/1.73SQ M
GLUCOSE SERPL-MCNC: 113 MG/DL (ref 65–140)
HCT VFR BLD AUTO: 34.8 % (ref 34.8–46.1)
HGB BLD-MCNC: 11.3 G/DL (ref 11.5–15.4)
LYMPHOCYTES # BLD AUTO: 1.66 THOUSAND/UL (ref 0.6–4.47)
LYMPHOCYTES # BLD AUTO: 19 % (ref 14–44)
MAGNESIUM SERPL-MCNC: 2 MG/DL (ref 1.6–2.6)
MCH RBC QN AUTO: 30.5 PG (ref 26.8–34.3)
MCHC RBC AUTO-ENTMCNC: 32.5 G/DL (ref 31.4–37.4)
MCV RBC AUTO: 94 FL (ref 82–98)
METAMYELOCYTES NFR BLD MANUAL: 4 % (ref 0–1)
MONOCYTES # BLD AUTO: 0.96 THOUSAND/UL (ref 0–1.22)
MONOCYTES NFR BLD: 11 % (ref 4–12)
NEUTROPHILS # BLD MANUAL: 5.49 THOUSAND/UL (ref 1.85–7.62)
NEUTS BAND NFR BLD MANUAL: 3 % (ref 0–8)
NEUTS SEG NFR BLD AUTO: 60 % (ref 43–75)
NRBC BLD AUTO-RTO: 0 /100 WBCS
PLATELET # BLD AUTO: 230 THOUSANDS/UL (ref 149–390)
PLATELET BLD QL SMEAR: ADEQUATE
PMV BLD AUTO: 11.2 FL (ref 8.9–12.7)
POTASSIUM SERPL-SCNC: 3.7 MMOL/L (ref 3.5–5.3)
PROT SERPL-MCNC: 6 G/DL (ref 6.4–8.2)
QRS AXIS: -34 DEGREES
QRSD INTERVAL: 146 MS
QT INTERVAL: 292 MS
QTC INTERVAL: 483 MS
RBC # BLD AUTO: 3.71 MILLION/UL (ref 3.81–5.12)
SODIUM SERPL-SCNC: 137 MMOL/L (ref 136–145)
T WAVE AXIS: 155 DEGREES
TOTAL CELLS COUNTED SPEC: 100
VARIANT LYMPHS # BLD AUTO: 2 %
VENTRICULAR RATE: 165 BPM
WBC # BLD AUTO: 8.72 THOUSAND/UL (ref 4.31–10.16)

## 2021-01-17 PROCEDURE — 99232 SBSQ HOSP IP/OBS MODERATE 35: CPT | Performed by: INTERNAL MEDICINE

## 2021-01-17 PROCEDURE — 85007 BL SMEAR W/DIFF WBC COUNT: CPT | Performed by: INTERNAL MEDICINE

## 2021-01-17 PROCEDURE — 99223 1ST HOSP IP/OBS HIGH 75: CPT | Performed by: INTERNAL MEDICINE

## 2021-01-17 PROCEDURE — 99239 HOSP IP/OBS DSCHRG MGMT >30: CPT | Performed by: INTERNAL MEDICINE

## 2021-01-17 PROCEDURE — 80048 BASIC METABOLIC PNL TOTAL CA: CPT | Performed by: INTERNAL MEDICINE

## 2021-01-17 PROCEDURE — 80076 HEPATIC FUNCTION PANEL: CPT | Performed by: PHYSICIAN ASSISTANT

## 2021-01-17 PROCEDURE — 85027 COMPLETE CBC AUTOMATED: CPT | Performed by: INTERNAL MEDICINE

## 2021-01-17 PROCEDURE — 83735 ASSAY OF MAGNESIUM: CPT | Performed by: FAMILY MEDICINE

## 2021-01-17 PROCEDURE — 93005 ELECTROCARDIOGRAM TRACING: CPT

## 2021-01-17 PROCEDURE — 93010 ELECTROCARDIOGRAM REPORT: CPT | Performed by: INTERNAL MEDICINE

## 2021-01-17 PROCEDURE — NC001 PR NO CHARGE: Performed by: INTERNAL MEDICINE

## 2021-01-17 RX ORDER — POLYETHYLENE GLYCOL 3350 17 G/17G
17 POWDER, FOR SOLUTION ORAL DAILY PRN
Status: CANCELLED | OUTPATIENT
Start: 2021-01-17

## 2021-01-17 RX ORDER — SODIUM CHLORIDE FOR INHALATION 0.9 %
3 VIAL, NEBULIZER (ML) INHALATION EVERY 6 HOURS PRN
Status: CANCELLED | OUTPATIENT
Start: 2021-01-17

## 2021-01-17 RX ORDER — ACETAMINOPHEN 325 MG/1
650 TABLET ORAL EVERY 4 HOURS PRN
Status: DISCONTINUED | OUTPATIENT
Start: 2021-01-17 | End: 2021-01-21 | Stop reason: HOSPADM

## 2021-01-17 RX ORDER — FUROSEMIDE 40 MG/1
40 TABLET ORAL DAILY
Status: CANCELLED | OUTPATIENT
Start: 2021-01-18

## 2021-01-17 RX ORDER — LOSARTAN POTASSIUM 25 MG/1
25 TABLET ORAL DAILY
Status: DISCONTINUED | OUTPATIENT
Start: 2021-01-18 | End: 2021-01-21 | Stop reason: HOSPADM

## 2021-01-17 RX ORDER — SIMETHICONE 80 MG
80 TABLET,CHEWABLE ORAL 4 TIMES DAILY PRN
Status: DISCONTINUED | OUTPATIENT
Start: 2021-01-17 | End: 2021-01-21 | Stop reason: HOSPADM

## 2021-01-17 RX ORDER — POLYETHYLENE GLYCOL 3350 17 G/17G
17 POWDER, FOR SOLUTION ORAL DAILY PRN
Status: DISCONTINUED | OUTPATIENT
Start: 2021-01-17 | End: 2021-01-17

## 2021-01-17 RX ORDER — ACETAMINOPHEN 325 MG/1
650 TABLET ORAL EVERY 4 HOURS PRN
Status: CANCELLED | OUTPATIENT
Start: 2021-01-17

## 2021-01-17 RX ORDER — ATORVASTATIN CALCIUM 20 MG/1
20 TABLET, FILM COATED ORAL
Status: DISCONTINUED | OUTPATIENT
Start: 2021-01-18 | End: 2021-01-21 | Stop reason: HOSPADM

## 2021-01-17 RX ORDER — AMIODARONE HYDROCHLORIDE 200 MG/1
200 TABLET ORAL
Status: DISCONTINUED | OUTPATIENT
Start: 2021-01-18 | End: 2021-01-20

## 2021-01-17 RX ORDER — METOPROLOL TARTRATE 5 MG/5ML
2.5 INJECTION INTRAVENOUS ONCE AS NEEDED
Status: COMPLETED | OUTPATIENT
Start: 2021-01-17 | End: 2021-01-17

## 2021-01-17 RX ORDER — FUROSEMIDE 40 MG/1
40 TABLET ORAL DAILY
Status: DISCONTINUED | OUTPATIENT
Start: 2021-01-18 | End: 2021-01-21 | Stop reason: HOSPADM

## 2021-01-17 RX ORDER — OXYCODONE HYDROCHLORIDE 5 MG/1
2.5 TABLET ORAL EVERY 4 HOURS PRN
Status: DISCONTINUED | OUTPATIENT
Start: 2021-01-17 | End: 2021-01-21 | Stop reason: HOSPADM

## 2021-01-17 RX ORDER — SENNOSIDES 8.6 MG
8.6 TABLET ORAL
Status: DISCONTINUED | OUTPATIENT
Start: 2021-01-17 | End: 2021-01-17

## 2021-01-17 RX ORDER — AMIODARONE HYDROCHLORIDE 200 MG/1
200 TABLET ORAL
Status: CANCELLED | OUTPATIENT
Start: 2021-01-18

## 2021-01-17 RX ORDER — DIGOXIN 0.25 MG/ML
500 INJECTION INTRAMUSCULAR; INTRAVENOUS ONCE
Status: COMPLETED | OUTPATIENT
Start: 2021-01-17 | End: 2021-01-17

## 2021-01-17 RX ORDER — SACCHAROMYCES BOULARDII 250 MG
250 CAPSULE ORAL 2 TIMES DAILY
Status: CANCELLED | OUTPATIENT
Start: 2021-01-17

## 2021-01-17 RX ORDER — PANTOPRAZOLE SODIUM 40 MG/1
40 TABLET, DELAYED RELEASE ORAL
Status: CANCELLED | OUTPATIENT
Start: 2021-01-18

## 2021-01-17 RX ORDER — SENNOSIDES 8.6 MG
8.6 TABLET ORAL
Status: CANCELLED | OUTPATIENT
Start: 2021-01-17

## 2021-01-17 RX ORDER — PANTOPRAZOLE SODIUM 40 MG/1
40 TABLET, DELAYED RELEASE ORAL
Status: DISCONTINUED | OUTPATIENT
Start: 2021-01-18 | End: 2021-01-21 | Stop reason: HOSPADM

## 2021-01-17 RX ORDER — LOPERAMIDE HYDROCHLORIDE 2 MG/1
2 CAPSULE ORAL 3 TIMES DAILY PRN
Status: DISCONTINUED | OUTPATIENT
Start: 2021-01-17 | End: 2021-01-18

## 2021-01-17 RX ORDER — POTASSIUM CHLORIDE 20 MEQ/1
40 TABLET, EXTENDED RELEASE ORAL ONCE
Status: COMPLETED | OUTPATIENT
Start: 2021-01-17 | End: 2021-01-17

## 2021-01-17 RX ORDER — ONDANSETRON 2 MG/ML
4 INJECTION INTRAMUSCULAR; INTRAVENOUS EVERY 6 HOURS PRN
Status: CANCELLED | OUTPATIENT
Start: 2021-01-17

## 2021-01-17 RX ORDER — NITROGLYCERIN 0.4 MG/1
0.4 TABLET SUBLINGUAL
Status: CANCELLED | OUTPATIENT
Start: 2021-01-17

## 2021-01-17 RX ORDER — ONDANSETRON 2 MG/ML
4 INJECTION INTRAMUSCULAR; INTRAVENOUS EVERY 6 HOURS PRN
Status: DISCONTINUED | OUTPATIENT
Start: 2021-01-17 | End: 2021-01-21 | Stop reason: HOSPADM

## 2021-01-17 RX ORDER — SIMETHICONE 80 MG
80 TABLET,CHEWABLE ORAL 4 TIMES DAILY PRN
Status: CANCELLED | OUTPATIENT
Start: 2021-01-17

## 2021-01-17 RX ORDER — OXYCODONE HYDROCHLORIDE 5 MG/1
2.5 TABLET ORAL EVERY 4 HOURS PRN
Status: CANCELLED | OUTPATIENT
Start: 2021-01-17

## 2021-01-17 RX ORDER — RANOLAZINE 500 MG/1
1000 TABLET, EXTENDED RELEASE ORAL 2 TIMES DAILY
Status: CANCELLED | OUTPATIENT
Start: 2021-01-17

## 2021-01-17 RX ORDER — ATORVASTATIN CALCIUM 20 MG/1
20 TABLET, FILM COATED ORAL
Status: CANCELLED | OUTPATIENT
Start: 2021-01-18

## 2021-01-17 RX ORDER — METOPROLOL SUCCINATE 50 MG/1
50 TABLET, EXTENDED RELEASE ORAL DAILY
Status: CANCELLED | OUTPATIENT
Start: 2021-01-18

## 2021-01-17 RX ORDER — SACCHAROMYCES BOULARDII 250 MG
250 CAPSULE ORAL 2 TIMES DAILY
Status: DISCONTINUED | OUTPATIENT
Start: 2021-01-18 | End: 2021-01-21 | Stop reason: HOSPADM

## 2021-01-17 RX ORDER — OXYCODONE HYDROCHLORIDE 5 MG/1
5 TABLET ORAL EVERY 4 HOURS PRN
Status: DISCONTINUED | OUTPATIENT
Start: 2021-01-17 | End: 2021-01-21 | Stop reason: HOSPADM

## 2021-01-17 RX ORDER — PRASUGREL 10 MG/1
10 TABLET, FILM COATED ORAL DAILY
Status: DISCONTINUED | OUTPATIENT
Start: 2021-01-18 | End: 2021-01-21 | Stop reason: HOSPADM

## 2021-01-17 RX ORDER — SODIUM CHLORIDE FOR INHALATION 0.9 %
3 VIAL, NEBULIZER (ML) INHALATION EVERY 6 HOURS PRN
Status: DISCONTINUED | OUTPATIENT
Start: 2021-01-17 | End: 2021-01-17

## 2021-01-17 RX ORDER — RANOLAZINE 500 MG/1
1000 TABLET, EXTENDED RELEASE ORAL 2 TIMES DAILY
Status: DISCONTINUED | OUTPATIENT
Start: 2021-01-18 | End: 2021-01-21 | Stop reason: HOSPADM

## 2021-01-17 RX ORDER — PRASUGREL 10 MG/1
10 TABLET, FILM COATED ORAL DAILY
Status: CANCELLED | OUTPATIENT
Start: 2021-01-18

## 2021-01-17 RX ORDER — MAGNESIUM HYDROXIDE/ALUMINUM HYDROXICE/SIMETHICONE 120; 1200; 1200 MG/30ML; MG/30ML; MG/30ML
30 SUSPENSION ORAL EVERY 4 HOURS PRN
Status: DISCONTINUED | OUTPATIENT
Start: 2021-01-17 | End: 2021-01-21 | Stop reason: HOSPADM

## 2021-01-17 RX ORDER — ASPIRIN 81 MG/1
81 TABLET, CHEWABLE ORAL DAILY
Status: DISCONTINUED | OUTPATIENT
Start: 2021-01-18 | End: 2021-01-19

## 2021-01-17 RX ORDER — ASPIRIN 81 MG/1
81 TABLET ORAL DAILY
Status: CANCELLED | OUTPATIENT
Start: 2021-01-18

## 2021-01-17 RX ORDER — LEVALBUTEROL 1.25 MG/.5ML
1.25 SOLUTION, CONCENTRATE RESPIRATORY (INHALATION) EVERY 6 HOURS PRN
Status: CANCELLED | OUTPATIENT
Start: 2021-01-17

## 2021-01-17 RX ORDER — AMIODARONE HYDROCHLORIDE 200 MG/1
200 TABLET ORAL
Status: DISCONTINUED | OUTPATIENT
Start: 2021-01-17 | End: 2021-01-17 | Stop reason: HOSPADM

## 2021-01-17 RX ORDER — LOSARTAN POTASSIUM 25 MG/1
25 TABLET ORAL DAILY
Status: CANCELLED | OUTPATIENT
Start: 2021-01-18

## 2021-01-17 RX ORDER — AMIODARONE HYDROCHLORIDE 200 MG/1
200 TABLET ORAL
Status: DISCONTINUED | OUTPATIENT
Start: 2021-01-17 | End: 2021-01-17

## 2021-01-17 RX ORDER — LEVALBUTEROL 1.25 MG/.5ML
1.25 SOLUTION, CONCENTRATE RESPIRATORY (INHALATION) EVERY 6 HOURS PRN
Status: DISCONTINUED | OUTPATIENT
Start: 2021-01-17 | End: 2021-01-17

## 2021-01-17 RX ORDER — NITROGLYCERIN 0.4 MG/1
0.4 TABLET SUBLINGUAL
Status: DISCONTINUED | OUTPATIENT
Start: 2021-01-17 | End: 2021-01-21 | Stop reason: HOSPADM

## 2021-01-17 RX ORDER — MAGNESIUM HYDROXIDE/ALUMINUM HYDROXICE/SIMETHICONE 120; 1200; 1200 MG/30ML; MG/30ML; MG/30ML
30 SUSPENSION ORAL EVERY 4 HOURS PRN
Status: CANCELLED | OUTPATIENT
Start: 2021-01-17

## 2021-01-17 RX ORDER — AMIODARONE HYDROCHLORIDE 200 MG/1
200 TABLET ORAL
Status: DISCONTINUED | OUTPATIENT
Start: 2021-01-18 | End: 2021-01-17

## 2021-01-17 RX ORDER — ASPIRIN 81 MG/1
81 TABLET ORAL DAILY
Status: DISCONTINUED | OUTPATIENT
Start: 2021-01-18 | End: 2021-01-17

## 2021-01-17 RX ORDER — METOPROLOL SUCCINATE 50 MG/1
50 TABLET, EXTENDED RELEASE ORAL DAILY
Status: DISCONTINUED | OUTPATIENT
Start: 2021-01-18 | End: 2021-01-21 | Stop reason: HOSPADM

## 2021-01-17 RX ADMIN — DEXTROSE 150 MG: 50 INJECTION, SOLUTION INTRAVENOUS at 10:13

## 2021-01-17 RX ADMIN — PRASUGREL 10 MG: 10 TABLET, FILM COATED ORAL at 08:35

## 2021-01-17 RX ADMIN — ASPIRIN 81 MG: 81 TABLET, COATED ORAL at 08:36

## 2021-01-17 RX ADMIN — METOROPROLOL TARTRATE 2.5 MG: 5 INJECTION, SOLUTION INTRAVENOUS at 08:24

## 2021-01-17 RX ADMIN — SODIUM CHLORIDE 250 ML: 0.9 INJECTION, SOLUTION INTRAVENOUS at 09:14

## 2021-01-17 RX ADMIN — Medication 250 MG: at 18:22

## 2021-01-17 RX ADMIN — PANTOPRAZOLE SODIUM 40 MG: 40 TABLET, DELAYED RELEASE ORAL at 08:37

## 2021-01-17 RX ADMIN — Medication 125 MG: at 05:09

## 2021-01-17 RX ADMIN — HEPARIN SODIUM 5000 UNITS: 5000 INJECTION INTRAVENOUS; SUBCUTANEOUS at 05:09

## 2021-01-17 RX ADMIN — RANOLAZINE 1000 MG: 500 TABLET, FILM COATED, EXTENDED RELEASE ORAL at 08:36

## 2021-01-17 RX ADMIN — DICLOFENAC SODIUM 2 G: 10 GEL TOPICAL at 23:08

## 2021-01-17 RX ADMIN — RANOLAZINE 1000 MG: 500 TABLET, FILM COATED, EXTENDED RELEASE ORAL at 18:22

## 2021-01-17 RX ADMIN — AMIODARONE HYDROCHLORIDE 200 MG: 200 TABLET ORAL at 14:14

## 2021-01-17 RX ADMIN — ATORVASTATIN CALCIUM 20 MG: 20 TABLET, FILM COATED ORAL at 18:22

## 2021-01-17 RX ADMIN — ENOXAPARIN SODIUM 70 MG: 80 INJECTION SUBCUTANEOUS at 09:11

## 2021-01-17 RX ADMIN — SODIUM CHLORIDE 250 ML: 0.9 INJECTION, SOLUTION INTRAVENOUS at 08:35

## 2021-01-17 RX ADMIN — ENOXAPARIN SODIUM 70 MG: 80 INJECTION SUBCUTANEOUS at 23:08

## 2021-01-17 RX ADMIN — POTASSIUM CHLORIDE 40 MEQ: 1500 TABLET, EXTENDED RELEASE ORAL at 09:11

## 2021-01-17 RX ADMIN — DIGOXIN 500 MCG: 0.25 INJECTION INTRAMUSCULAR; INTRAVENOUS at 09:04

## 2021-01-17 RX ADMIN — Medication 250 MG: at 08:37

## 2021-01-17 RX ADMIN — LOPERAMIDE HYDROCHLORIDE 2 MG: 2 CAPSULE ORAL at 23:08

## 2021-01-17 NOTE — PROGRESS NOTES
Patient given amiodarone bolus 150mg x 1 at 1015 this AM due to afib w/ RVR + hypotension limiting rate control, around 1148AM had a 4 second conversion pause for which she was asymptomatic  Given TBS + LBBB and EF 45% she would benefit from CRT-P or LPF, plan is for continuing amiodarone 200mg daily for now with transfer to hospitals for EP evaluation and pacemaker  Patient,  and granddaughter Isai Leong were updated and agreeable to this plan  Please make sure patient is NPO after midnight tonight, would hold lovenox in the AM on 1-18  Cardiology fellow notified of transfer

## 2021-01-17 NOTE — PROGRESS NOTES
Progress Note - Electrophysiology-Cardiology (EP)   Marvin Delgado 79 y o  female MRN: [id number]  Unit/Bed#: S -01 Encounter: [id number]      Assessment/Plan:   Primary diagnosis:   1  Acute on chronic mid range CHF exacerbation   * patient initially presented to T on 1-7 due to concerns of SOB  Cardiology initially consulted on 1-7 for HF therapy  * from this standpoint she is now euvolemic on PO lasix    * no signs of symptoms of CHF today     2  New onset paroxysmal atrial fibrillation, asymptomatic    * this AM on tele had period of new AF with RVR with HR's into the 160's and associated hypotension, IV lopressor 2 5mg x 1 and digoxin 500mcg IV x 1 was used without good response, given new onset <48H lovenox 1mg/kg was used to [facility] and amiodarone 150mg IV bolus was placed, amiodarone given at 1015 followed by 200mg TID load as rate control would be limited due to her intermittent hypotension which has precluded medical management for her ICMP and her advanced age make her a good candidate for rhythm control with amiodarone    * after bolus HR's slowly did decline to controlled rates, however at 11:48:33 on tele she chemically converted to NSR with a 6 second conversion pause for which she was asymptomatic, HR's have been in the 70's since with great BP response    * given her LBBB at baseline with large conversion pauses + EF 45% patient would likely benefit from either AF ablation vs BIV PPM + amiodarone loading, I think with her LBBB and age BIV PPM may be the better option    * for now will continue tele monitoring and amiodarone 200mg once daily to see if we can reduce AF burden until further tx decision can be made    * continue lovenox for [facility] for now as well     3  NSTEMI   * on admit had elevated trop in setting of flash pulmonary edema now resolved    * underwent LHC on 1-11-21 now s/p LAMINE to LM and ostial LAD    * now on triple therapy due to new onset AF     4   Aspiration PNA    * suspected aspiration PNA    * now off ABX but on dysphagia diet    * afebrile      Secondary diagnosis:   1  CAD s/p CABG x 4  a  LIMA to LAD, SVG to D1, SVG to OM1, SVG to RCA   b  In   2  LBBB   3  ICMP EF 45%   4  HLD  5  B/L renal artery stenosis     EKG:       Imaging: I have personally reviewed pertinent reports  Results for orders placed during the hospital encounter of 20   Echo complete with contrast if indicated    Narrative [address]  [facility], [address] [facility]  [phone]    Transthoracic Echocardiogram  2D, M-mode, Doppler, and Color Doppler    Study date:  03-Dec-2020    Patient: Oz Pacheco  MR number: [id number]  Account number: [de-identified]  : 1940  Age: 79 years  Gender: Female  Status: Inpatient  Location: Bedside  Height: 60 in  Weight: 136 lb  BP: 99/ 53 mmHg    Indications: Heart Failure    Diagnoses: I50 9 - Heart failure, unspecified    Sonographer:  Abraham Magana RDCS  Primary Physician:  Nighat Anderson MD  Referring Physician:  Luis A Castro DO  Group:  [facility]  Interpreting Physician:  Jaden Clemente MD    SUMMARY    LEFT VENTRICLE:  Systolic function was mildly reduced  Ejection fraction was estimated to be 45 %  There was mild diffuse hypokinesis  Wall thickness was mildly to moderately increased  There was mild concentric hypertrophy  Doppler parameters were consistent with abnormal left ventricular relaxation (grade 1 diastolic dysfunction)  VENTRICULAR SEPTUM:  There was paradoxical motion  These changes are consistent with LBBB  LEFT ATRIUM:  The atrium was mildly dilated  MITRAL VALVE:  There was mild to moderate regurgitation  TRICUSPID VALVE:  There was mild regurgitation  Estimated peak PA pressure was 55 mmHg  The findings suggest moderate pulmonary hypertension  PULMONIC VALVE:  There was trace regurgitation      HISTORY: PRIOR HISTORY: CAD, Hypertension, GERD, Hyperlipidemia, CP, Stent, CABG, Raynaud's    PROCEDURE: The procedure was performed at the bedside  This was a routine study  The transthoracic approach was used  The study included complete 2D imaging, M-mode, complete spectral Doppler, and color Doppler  The heart rate was 82 bpm,  at the start of the study  Images were obtained from the parasternal, apical, subcostal, and suprasternal notch acoustic windows  Intravenous contrast ( 0 6ml Definity in NSS) was administered to opacify the left ventricle  Echocardiographic views were limited due to decreased penetration and lung interference  This was a technically difficult study  LEFT VENTRICLE: Size was normal  Systolic function was mildly reduced  Ejection fraction was estimated to be 45 %  There was mild diffuse hypokinesis  Wall thickness was mildly to moderately increased  There was mild concentric  hypertrophy  DOPPLER: Doppler parameters were consistent with abnormal left ventricular relaxation (grade 1 diastolic dysfunction)  VENTRICULAR SEPTUM: There was paradoxical motion  These changes are consistent with LBBB  RIGHT VENTRICLE: The size was normal  Systolic function was normal  Wall thickness was normal     LEFT ATRIUM: The atrium was mildly dilated  RIGHT ATRIUM: Size was normal     MITRAL VALVE: Valve structure was normal  There was normal leaflet separation  DOPPLER: The transmitral velocity was within the normal range  There was no evidence for stenosis  There was mild to moderate regurgitation  AORTIC VALVE: The valve was trileaflet  Leaflets exhibited normal thickness and normal cuspal separation  DOPPLER: Transaortic velocity was within the normal range  There was no evidence for stenosis  There was no regurgitation  TRICUSPID VALVE: The valve structure was normal  There was normal leaflet separation  DOPPLER: The transtricuspid velocity was within the normal range  There was no evidence for stenosis   There was mild regurgitation  Estimated peak PA  pressure was 55 mmHg  The findings suggest moderate pulmonary hypertension  PULMONIC VALVE: Leaflets exhibited normal thickness, no calcification, and normal cuspal separation  DOPPLER: The transpulmonic velocity was within the normal range  There was trace regurgitation  PERICARDIUM: There was no pericardial effusion  The pericardium was normal in appearance  AORTA: The root exhibited normal size  SYSTEM MEASUREMENT TABLES    2D  %FS: 17 94 %  Ao Diam: 2 72 cm  Ao asc: 2 46 cm  EDV(Teich): 55 79 ml  EF(Teich): 38 05 %  ESV(Teich): 34 57 ml  IVSd: 1 08 cm  LA Area: 16 14 cm2  LA Diam: 3 04 cm  LVEDV MOD A4C: 52 91 ml  LVEF MOD A4C: 38 16 %  LVESV MOD A4C: 32 72 ml  LVIDd: 3 64 cm  LVIDs: 2 98 cm  LVLd A4C: 6 09 cm  LVLs A4C: 5 64 cm  LVPWd: 0 93 cm  RA Area: 7 24 cm2  RVIDd: 2 34 cm  SV MOD A4C: 20 19 ml  SV(Teich): 21 23 ml    CW  TR Vmax: 3 73 m/s  TR maxP 52 mmHg    MM  TAPSE: 2 12 cm    PW  E' Sept: 0 04 m/s  E/E' Sept: 21 24  MV A Nicola: 1 42 m/s  MV Dec [place]: 4 42 m/s2  MV DecT: 191 8 ms  MV E Nicola: 0 85 m/s  MV E/A Ratio: 0 6  MV PHT: 55 62 ms  MVA By PHT: 3 96 cm2    [facility] Accredited Echocardiography Laboratory    Prepared and electronically signed by    Katie Mosley MD  Signed 03-Dec-2020 13:58:50         Subjective/Objective   Subjective: Today patient followed by cardiology due to new onset atrial fibrillation w/ RVR  Currently patient resting comfortably without any palpitations, LH, dizziness, SOB or chest discomfort       Vitals: /58 (BP Location: Left arm)   Pulse (!) 117   Temp 98 3 °F (36 8 °C) (Axillary)   Resp 16   Ht 5' (1 524 m)   Wt 65 3 kg (143 lb 15 4 oz)   SpO2 98%   BMI 28 12 kg/m²   Vitals:    01/15/21 1118 21 0600   Weight: 65 9 kg (145 lb 3 2 oz) 65 3 kg (143 lb 15 4 oz)     Orthostatic Blood Pressures      Most Recent Value   Blood Pressure  119/58 filed at 2021 1059   Patient Position - Orthostatic VS  Sitting filed at 01/17/2021 1059            Intake/Output Summary (Last 24 hours) at 1/17/2021 1224  Last data filed at 1/17/2021 3587  Gross per 24 hour   Intake 240 ml   Output 681 ml   Net -441 ml       Invasive Devices     Peripheral Intravenous Line            Peripheral IV 01/14/21 Right Forearm 2 days                            Scheduled Meds:  Current Facility-Administered Medications   Medication Dose Route Frequency Provider Last Rate    acetaminophen  650 mg Oral Q4H PRN Geena Patel MD      aluminum-magnesium hydroxide-simethicone  30 mL Oral Q4H PRN Catherine Dubs, CRNP      amiodarone  200 mg Oral TID With Meals Anthony Quintero, CARLY      aspirin  81 mg Oral Daily Buck Levine, KATHY      atorvastatin  20 mg Oral Daily With Yin España MD      Diclofenac Sodium  2 g Topical 4x Daily Geena Patel MD      enoxaparin  1 mg/kg Subcutaneous Q12H [facility] Chris Guaman MD      fentanyl citrate (PF)  50 mcg Intravenous Once Be Beth PA-C      furosemide  40 mg Oral Daily Catherine Dubs, CRNP      levalbuterol  1 25 mg Nebulization Q6H PRN Benjamin Barney DO      loperamide  2 mg Oral TID PRN Tila Min MD      losartan  25 mg Oral Daily Catherine Dubs, CRNP      metoprolol succinate  50 mg Oral Daily Catherine Dubs, CRNP      morphine injection  1 mg Intravenous Q4H PRN Geena Patel MD      morphine injection  1 mg Intravenous Once KATHY Horowitz      morphine injection  1 mg Intravenous Once Catherine Dubs, KHADRANP      nitroglycerin  0 4 mg Sublingual Q5 Min PRN Geena Patel MD      ondansetron  4 mg Intravenous Once Deonna Tena PA-C      ondansetron  4 mg Intravenous Q6H PRN Geena Patel MD      oxyCODONE  2 5 mg Oral Q4H PRN Geena Patel MD      pantoprazole  40 mg Oral Q12H Chris Guaman MD      polyethylene glycol  17 g Oral Daily PRN MD Darian Viera prasugrel  10 mg Oral Daily KATHY Mcleod      ranolazine  1,000 mg Oral BID Francie Bryant MD      saccharomyces boulardii  250 mg Oral BID Laurita Dixon MD      senna  8 6 mg Oral HS PRN Laurita Dixon MD      simethicone  80 mg Oral 4x Daily PRN Francie Bryant MD      sodium chloride  3 mL Nebulization Q6H PRN Josefina Marroquin DO       Continuous Infusions:   PRN Meds:   acetaminophen    aluminum-magnesium hydroxide-simethicone    levalbuterol    loperamide    morphine injection    nitroglycerin    ondansetron    oxyCODONE    polyethylene glycol    senna    simethicone    sodium chloride    Physical Exam:   GEN: NAD, alert and oriented, well appearing  SKIN: dry without significant lesions or rashes  HEENT: NCAT, PERRL, EOMs intact  NECK: No JVD or carotid bruits appreciated  CARDIOVASCULAR: tachycardic, irregularly irregular rhythm,  normal S1, S2 without murmurs, rubs, or gallops appreciated  LUNGS: Clear to auscultation bilaterally without wheezes, rhonchi, or rales  ABDOMEN: Soft, nontender, nondistended  EXTREMITIES/VASCULAR: perfused without clubbing, cyanosis, or edema b/l  PSYCH: Normal mood and affect  NEURO: CN ll-Xll grossly intact                Lab Results: I have personally reviewed pertinent lab results      Results from last 7 days   Lab Units 01/17/21  0520 01/14/21  1411 01/13/21  1518   WBC Thousand/uL 8 72 10 95* 12 20*   HEMOGLOBIN g/dL 11 3* 11 4* 10 4*   HEMATOCRIT % 34 8 34 9 32 5*   PLATELETS Thousands/uL 230 218 178     Results from last 7 days   Lab Units 01/17/21  0520 01/15/21  1032 01/13/21  0647   POTASSIUM mmol/L 3 7 3 2* 3 8   CHLORIDE mmol/L 102 102 99*   CO2 mmol/L 24 25 23   BUN mg/dL 14 20 23   CREATININE mg/dL 0 99 1 13 1 04   CALCIUM mg/dL 8 6 8 2* 8 6     Results from last 7 days   Lab Units 01/11/21  1248 01/11/21  0429   INR  0 91 0 91   PTT seconds 21*  --      Results from last 7 days   Lab Units 01/17/21  0520 01/11/21  1208 MAGNESIUM mg/dL 2 0 2 2

## 2021-01-17 NOTE — ASSESSMENT & PLAN NOTE
Type I  Cardiac cath completed 1/11/2021  Stent placed left main     · Continue Lipitor  · Isosorbide mononitrate /Metoprolol/Aspirin and Jeanette Manuel Sumner

## 2021-01-17 NOTE — ASSESSMENT & PLAN NOTE
Latest troponin 3 29 (1/11/2021) after patient stated she had abdominal/chest pain    - Cardiology on board  - Cardiac cath completed, stent placed Left main  - d/c heparin as we have started Lovenox 1mg/kg

## 2021-01-17 NOTE — ASSESSMENT & PLAN NOTE
Wt Readings from Last 3 Encounters:   01/16/21 65 3 kg (143 lb 15 4 oz)   01/05/21 63 1 kg (139 lb 3 2 oz)   12/23/20 62 4 kg (137 lb 9 1 oz)     · Cardiology following   · Continue Lasix 40mg PO   · Patient on 2L o2  · CXR revealed RLL infiltrate which is now improving  · Xopenex ordered PRN  · Continue to monitor

## 2021-01-17 NOTE — ASSESSMENT & PLAN NOTE
Contacted by nurse regarding patients HR >160  EKG revealed Atrial fibrillation   Patient is currently feeling well    - Lopressor 2 5mg IV administered  - 250ml NS IV bolus  - Discussed with cardiology, recommended digoxin 500 (ordered) and AC (lovenox ordered)

## 2021-01-17 NOTE — ASSESSMENT & PLAN NOTE
Wt Readings from Last 3 Encounters:   01/16/21 65 3 kg (143 lb 15 4 oz)   01/05/21 63 1 kg (139 lb 3 2 oz)   12/23/20 62 4 kg (137 lb 9 1 oz)        Presented with increased shortness of breath, dyspnea on exertion and lower extremity edema  83/5 ECHO: Systolic function was mildly reduced, Ejection fraction was estimated to be 45 %  There was mild diffuse hypokinesis (grade 1 diastolic dysfunction)      Lab Results   Component Value Date    NTBNP 1,254 (H) 01/07/2021    NTBNP 958 (H) 12/02/2020     - continue lasix with hold parameters  - Continue to monitor urine output  - Daily weights  - Cardiology following

## 2021-01-17 NOTE — DISCHARGE SUMMARY
Discharge Summary - [facility] Internal Medicine    Patient Information: Arthur Villa 79 y o  female MRN: [id number]  Unit/Bed#: S -01 Encounter: [id number]    Discharging Physician / Practitioner: Jose Elias Carlton MD  PCP: Trena Atwood MD  Admission Date: 1/7/2021  Discharge Date: 01/17/21    Disposition:     Other: SLB    Reason for Admission: CP and dyspnea    Discharge Diagnoses:       Acute combined systolic and diastolic congestive heart failure ([facility] [address] )    NSTEMI (non-ST elevated myocardial infarction) [facility]) s/p PCI and 2 LAMINE    Aspiration pneumonia ([facility] [address] )    New onset atrial fibrillation with rapid ventricular response ([facility] )      Consultations During Hospital Stay:  ·  cardiology      Procedures Performed:     ·  cardiac catheterization/ PCI/2 LAMINE  to left main and LAD    Significant Findings / Test Results:     ·  CTA PE study showed  Mild CHF  No PE  ·  CT abdomen pelvis  Showed right lower lobe pneumonia    Hospital Course:     Arthur Villa is a 79 y o  female patient with PMHx of CAD  s/p CABG and stents,HTN and HLD  who originally presented to the hospital on 1/7/2021 due to  Intermittent chest pain and dyspnea  Patient was  recently hospitalized at [facility] in December for chest pain and angina and she was recommended for medical management at the time  She continued to have intermittent chest pain and dyspnea  Workup here showed congestive heart failure and elevated troponin  Patient underwent cardiac catheterization on 1/11/21 with PCI/ stenting to the left main and LAD  She was also found to have right lower lobe pneumonia which is felt due to aspiration/vomiting  She completed a course of antibiotics  Patient had rapid response on 1/13  Due to hypotension  and decreased responsiveness  It resolved after IV fluid  She appeared to have more abd tenderness on exam  during rapid response therefore CT abdomen pelvis was repeated and showed nonspecific enteritis/ colitis  Patient didn't have diarrhea per nursing  She was tolerating diet with no complaints  Patient has been doing relatively ok  until this morning when she went into new onset AFib  She was given amiodarone, loading dose of digoxin and metoprolol  She did convert to NSR but was found to have about 4 seconds of pause before conversion  Decision was made for biv pacemaker  Patient will be [facility] for EP evaluation  She has been receiving therapeutic dose Lovenox  Lovenox will be on hold after last dose tonight for tentative pacemaker placement in a marisa Patel Patient's daughter Dilma Vyas is the primary contact with the cell phone number  [phone]  I spoke with Dr Scotty Saez from [facility]  who has kindly  accepted the transfer            Discharge Day Visit / Exam:     Subjective:   Patient went into rapid AFib this morning  Now converted to sinus after  Amiodarone, metoprolol digoxin  She denies palpitation or lightheadedness  She in fact appears more alert this   morning  Vitals: Blood Pressure: 98/58 (01/17/21 1500)  Pulse: 78 (01/17/21 1238)  Temperature: 98 2 °F (36 8 °C) (01/17/21 1500)  Temp Source: Axillary (01/17/21 1500)  Respirations: 16 (01/17/21 1500)  Height: 5' (152 4 cm) (01/13/21 2000)  Weight - Scale: 65 3 kg (143 lb 15 4 oz) (01/16/21 0600)  SpO2: 95 % (01/17/21 1238)  Exam:   Physical Exam  Constitutional:       General: She is not in acute distress  Appearance: She is not ill-appearing, toxic-appearing or diaphoretic  Eyes:      General:         Right eye: No discharge  Left eye: No discharge  Cardiovascular:      Rate and Rhythm: Normal rate  Rhythm irregular  Pulses: Normal pulses  Pulmonary:      Effort: Pulmonary effort is normal  No respiratory distress  Breath sounds: Normal breath sounds  No wheezing  Abdominal:      General: Abdomen is flat  Bowel sounds are normal  There is no distension  Palpations: Abdomen is soft     Musculoskeletal: General: No swelling  Skin:     General: Skin is warm  Neurological:      Mental Status: She is oriented to person, place, and time  Discussion with Family:  I spoke with patient's family    Discharge instructions/Information to patient and family:   See after visit summary for information provided to patient and family  Provisions for Follow-Up Care:  See after visit summary for information related to follow-up care and any pertinent home health orders  Planned Readmission: No     Discharge Statement:  I spent 30 minutes discharging the patient  This time was spent on the day of discharge  I had direct contact with the patient on the day of discharge  Greater than 50% of the total time was spent examining patient, answering all patient questions, arranging and discussing plan of care with patient as well as directly providing post-discharge instructions  Additional time then spent on discharge activities  Discharge Medications:  See after visit summary for reconciled discharge medications provided to patient and family        ** Please Note: This note has been constructed using a voice recognition system **

## 2021-01-17 NOTE — ASSESSMENT & PLAN NOTE
Pain in suprapubic region on palpation during physical exam has mostly resolved  Bladder scan continue to reveal retention with the need for straight cath  Some pain diffusely  CT scan 1/13/2021 revealed colitis      - Patient encouraged to use commode, only small amount of urine expelled  - Straight cath as needed  - May require dominguez  - Urine culture showed no growth

## 2021-01-17 NOTE — PROGRESS NOTES
Progress Note - Rosie Conner 1940, 79 y o  female MRN: [id number]    Unit/Bed#: S -01 Encounter: [id number]    Primary Care Provider: Bi Andre MD   Date and time admitted to hospital: 1/7/2021  7:41 PM        * Acute combined systolic and diastolic congestive heart failure ([facility] )  Assessment & Plan  Wt Readings from Last 3 Encounters:   01/16/21 65 3 kg (143 lb 15 4 oz)   01/05/21 63 1 kg (139 lb 3 2 oz)   12/23/20 62 4 kg (137 lb 9 1 oz)        Presented with increased shortness of breath, dyspnea on exertion and lower extremity edema  10/1 ECHO: Systolic function was mildly reduced, Ejection fraction was estimated to be 45 %  There was mild diffuse hypokinesis (grade 1 diastolic dysfunction)  Lab Results   Component Value Date    NTBNP 1,254 (H) 01/07/2021    NTBNP 958 (H) 12/02/2020     - continue lasix with hold parameters  - Continue to monitor urine output  - Daily weights  - Cardiology following        Atrial fibrillation with rapid ventricular response ([facility] )  Assessment & Plan  Contacted by nurse regarding patients HR >160  EKG revealed Atrial fibrillation  Patient is currently feeling well    - Lopressor 2 5mg IV administered  - 250ml NS IV bolus  - Discussed with cardiology, recommended digoxin 500 (ordered) and AC (lovenox ordered)      Generalized weakness  Assessment & Plan  Patient now realize that  needs rehab and is agreeable at this time    Aspiration pneumonia [facility])  Assessment & Plan  Completed course of antibiotics ceftriaxone/Flagyl  Respiratory status improved  O2 sats stable    Suprapubic pain  Assessment & Plan  Pain in suprapubic region on palpation during physical exam has mostly resolved  Bladder scan continue to reveal retention with the need for straight cath  Some pain diffusely  CT scan 1/13/2021 revealed colitis      - Patient encouraged to use commode, only small amount of urine expelled  - Straight cath as needed  - May require dominguez  - Urine culture showed no growth    Respiratory distress  Assessment & Plan  Patient requiring 2L o2 via NC  States she had vomited 2021 which may have caused an aspiration  CXR concerning for RLL pneumonia but is improving      - d/c rocephin and flagyl  - Monitor vitals      Elevated troponin  Assessment & Plan  Latest troponin 3 29 (2021) after patient stated she had abdominal/chest pain    - Cardiology on board  - Cardiac cath completed, stent placed Left main  - d/c heparin as we have started Lovenox 1mg/kg     Pulmonary edema cardiac cause [facility])  Assessment & Plan  Wt Readings from Last 3 Encounters:   21 65 3 kg (143 lb 15 4 oz)   21 63 1 kg (139 lb 3 2 oz)   20 62 4 kg (137 lb 9 1 oz)     · Cardiology following   · Continue Lasix 40mg PO   · Patient on 2L o2  · CXR revealed RLL infiltrate which is now improving  · Xopenex ordered PRN  · Continue to monitor    NSTEMI (non-ST elevated myocardial infarction) [facility])  Assessment & Plan  Type I  Cardiac cath completed 2021  Stent placed left main  · Continue Lipitor  · Isosorbide mononitrate /Metoprolol/Aspirin and Effient /Ranexa       Hyperlipidemia  Assessment & Plan  Continue Lipitor          VTE Pharmacologic Prophylaxis:   Pharmacologic: Enoxaparin (Lovenox)  Mechanical VTE Prophylaxis in Place: Yes    Discussions with Specialists or Other Care Team Provider: Cardiology    Education and Discussions with Family / Patient: Patient    Current Length of Stay: 10 day(s)    Current Patient Status: Inpatient     Discharge Plan / Estimated Discharge Date: TBD    Code Status: Level 1 - Full Code      Subjective:   Patient was seen at bedside resting comfortably  Nursing staff reported A  Fib with RVR  Unable to obtain blood pressure  HR >160  Patient asymptomatic during this time      Objective:     Vitals:   Temp (24hrs), Av 8 °F (36 6 °C), Min:97 6 °F (36 4 °C), Max:98 °F (36 7 °C)    Temp:  [97 6 °F (36 4 °C)-98 °F (36 7 °C)] 97 9 °F (36 6 °C)  HR:  [] 148  Resp:  [16-20] 20  BP: ()/(40-58) 86/40  SpO2:  [92 %] 92 %  Body mass index is 28 12 kg/m²  Input and Output Summary (last 24 hours): Intake/Output Summary (Last 24 hours) at 1/17/2021 0944  Last data filed at 1/17/2021 0653  Gross per 24 hour   Intake 240 ml   Output 681 ml   Net -441 ml       Physical Exam:     Physical Exam  Constitutional:       General: She is not in acute distress  Appearance: She is well-developed  She is not diaphoretic  HENT:      Head: Normocephalic and atraumatic  Eyes:      General: No scleral icterus  Right eye: No discharge  Left eye: No discharge  Conjunctiva/sclera: Conjunctivae normal    Cardiovascular:      Rate and Rhythm: Tachycardia present  Rhythm irregular  Heart sounds: Normal heart sounds  No murmur  Comments: Atrial fibrillation  Pulmonary:      Effort: No respiratory distress  Breath sounds: No wheezing  Abdominal:      Palpations: Abdomen is soft  Tenderness: There is no abdominal tenderness  Comments: Suprapubic tenderness     Musculoskeletal: Normal range of motion  General: No tenderness  Lymphadenopathy:      Cervical: No cervical adenopathy  Skin:     General: Skin is warm and dry  Coloration: Skin is not pale  Findings: No erythema  Neurological:      Mental Status: She is alert               Additional Data:     Labs:    Results from last 7 days   Lab Units 01/17/21  0520 01/14/21  1411   WBC Thousand/uL 8 72 10 95*   HEMOGLOBIN g/dL 11 3* 11 4*   HEMATOCRIT % 34 8 34 9   PLATELETS Thousands/uL 230 218   NEUTROS PCT %  --  82*   LYMPHS PCT %  --  8*   LYMPHO PCT % 19  --    MONOS PCT %  --  7   MONO PCT % 11  --    EOS PCT % 1 1     Results from last 7 days   Lab Units 01/17/21  0520  01/13/21  0647   POTASSIUM mmol/L 3 7   < > 3 8   CHLORIDE mmol/L 102   < > 99*   CO2 mmol/L 24   < > 23   BUN mg/dL 14   < > 23   CREATININE mg/dL 0 99   < > 1 04 CALCIUM mg/dL 8 6   < > 8 6   ALK PHOS U/L  --   --  68   ALT U/L  --   --  39   AST U/L  --   --  120*    < > = values in this interval not displayed  Results from last 7 days   Lab Units 01/11/21  1248   INR  0 91       * I Have Reviewed All Lab Data Listed Above  * Additional Pertinent Lab Tests Reviewed: All Labs Within Last 24 Hours Reviewed    Imaging:    Imaging Reports Reviewed Today Include: N/A  Imaging Personally Reviewed by Myself Includes:  N/A    Recent Cultures (last 7 days):     Results from last 7 days   Lab Units 01/14/21  0025 01/13/21  1518 01/13/21  1517   BLOOD CULTURE   --  No Growth at 72 hrs  No Growth at 72 hrs     URINE CULTURE  No Growth <1000 cfu/mL  --   --        Last 24 Hours Medication List:   Current Facility-Administered Medications   Medication Dose Route Frequency Provider Last Rate    acetaminophen  650 mg Oral Q4H PRN Lukas Hwang MD      aluminum-magnesium hydroxide-simethicone  30 mL Oral Q4H PRN KATHY Adkins      aspirin  81 mg Oral Daily [place], KATHY      atorvastatin  20 mg Oral Daily With Rayo Ortiz MD      Diclofenac Sodium  2 g Topical 4x Daily Lukas Hwang MD      enoxaparin  1 mg/kg Subcutaneous Q12H [facility] Nikki Miller MD      fentanyl citrate (PF)  50 mcg Intravenous Once Be Beth PA-C      furosemide  40 mg Oral Daily KATHY Adkins      levalbuterol  1 25 mg Nebulization Q6H PRN Coreen Lindsey DO      loperamide  2 mg Oral TID PRN Kitty Wick MD      losartan  25 mg Oral Daily KATHY Adkins      metoprolol succinate  50 mg Oral Daily KATHY Adkins      morphine injection  1 mg Intravenous Q4H PRN Lukas Hwang MD      morphine injection  1 mg Intravenous Once KATHY Adkins      morphine injection  1 mg Intravenous Once KATHY Adkins      nitroglycerin  0 4 mg Sublingual Q5 Min PRN Lukas Hwang MD      ondansetron  4 mg Intravenous Once Bennett Bryant PA-C      ondansetron  4 mg Intravenous Q6H PRN Nohelia Hanna MD      oxyCODONE  2 5 mg Oral Q4H PRN Nohelia Hanna MD      pantoprazole  40 mg Oral Q12H Vania Guillen MD      polyethylene glycol  17 g Oral Daily PRN Lizeth Aldridge MD      prasugrel  10 mg Oral Daily KATHY Thapa      ranolazine  1,000 mg Oral BID Nohelia Hanna MD      saccharomyces boulardii  250 mg Oral BID Lizeth Aldridge MD      senna  8 6 mg Oral HS PRN Lizeth Aldridge MD      simethicone  80 mg Oral 4x Daily PRN Nohelia Hanna MD      sodium chloride  250 mL Intravenous Once Vania Guillen  mL (01/17/21 0582)    sodium chloride  250 mL Intravenous Once Vania Guillen  mL (01/17/21 0914)    sodium chloride  3 mL Nebulization Q6H PRN Jerzy Lepe DO          Today, Patient Was Seen By: Vania Guillen MD    ** Please Note: This note has been constructed using a voice recognition system   **

## 2021-01-18 ENCOUNTER — EPISODE CHANGES (OUTPATIENT)
Dept: CASE MANAGEMENT | Facility: HOSPITAL | Age: 81
End: 2021-01-18

## 2021-01-18 ENCOUNTER — TRANSITIONAL CARE MANAGEMENT (OUTPATIENT)
Dept: FAMILY MEDICINE CLINIC | Facility: MEDICAL CENTER | Age: 81
End: 2021-01-18

## 2021-01-18 ENCOUNTER — ANESTHESIA (INPATIENT)
Dept: NON INVASIVE DIAGNOSTICS | Facility: HOSPITAL | Age: 81
DRG: 242 | End: 2021-01-18
Payer: MEDICARE

## 2021-01-18 LAB
ANION GAP SERPL CALCULATED.3IONS-SCNC: 5 MMOL/L (ref 4–13)
ATRIAL RATE: 84 BPM
BACTERIA BLD CULT: NORMAL
BACTERIA BLD CULT: NORMAL
BUN SERPL-MCNC: 12 MG/DL (ref 5–25)
CALCIUM SERPL-MCNC: 8.5 MG/DL (ref 8.3–10.1)
CHLORIDE SERPL-SCNC: 106 MMOL/L (ref 100–108)
CO2 SERPL-SCNC: 25 MMOL/L (ref 21–32)
CREAT SERPL-MCNC: 0.71 MG/DL (ref 0.6–1.3)
ERYTHROCYTE [DISTWIDTH] IN BLOOD BY AUTOMATED COUNT: 14.4 % (ref 11.6–15.1)
GFR SERPL CREATININE-BSD FRML MDRD: 80 ML/MIN/1.73SQ M
GLUCOSE SERPL-MCNC: 99 MG/DL (ref 65–140)
HCT VFR BLD AUTO: 33.4 % (ref 34.8–46.1)
HGB BLD-MCNC: 10.8 G/DL (ref 11.5–15.4)
MAGNESIUM SERPL-MCNC: 1.9 MG/DL (ref 1.6–2.6)
MCH RBC QN AUTO: 30.5 PG (ref 26.8–34.3)
MCHC RBC AUTO-ENTMCNC: 32.3 G/DL (ref 31.4–37.4)
MCV RBC AUTO: 94 FL (ref 82–98)
NRBC BLD AUTO-RTO: 0 /100 WBCS
P AXIS: 71 DEGREES
PLATELET # BLD AUTO: 224 THOUSANDS/UL (ref 149–390)
PMV BLD AUTO: 11.1 FL (ref 8.9–12.7)
POTASSIUM SERPL-SCNC: 4.1 MMOL/L (ref 3.5–5.3)
PR INTERVAL: 142 MS
QRS AXIS: 28 DEGREES
QRSD INTERVAL: 142 MS
QT INTERVAL: 396 MS
QTC INTERVAL: 467 MS
RBC # BLD AUTO: 3.54 MILLION/UL (ref 3.81–5.12)
SODIUM SERPL-SCNC: 136 MMOL/L (ref 136–145)
T WAVE AXIS: 142 DEGREES
VENTRICULAR RATE: 84 BPM
WBC # BLD AUTO: 10.07 THOUSAND/UL (ref 4.31–10.16)

## 2021-01-18 PROCEDURE — 97167 OT EVAL HIGH COMPLEX 60 MIN: CPT

## 2021-01-18 PROCEDURE — 80048 BASIC METABOLIC PNL TOTAL CA: CPT | Performed by: INTERNAL MEDICINE

## 2021-01-18 PROCEDURE — 97163 PT EVAL HIGH COMPLEX 45 MIN: CPT

## 2021-01-18 PROCEDURE — 93010 ELECTROCARDIOGRAM REPORT: CPT | Performed by: INTERNAL MEDICINE

## 2021-01-18 PROCEDURE — 85027 COMPLETE CBC AUTOMATED: CPT | Performed by: INTERNAL MEDICINE

## 2021-01-18 PROCEDURE — 99223 1ST HOSP IP/OBS HIGH 75: CPT | Performed by: INTERNAL MEDICINE

## 2021-01-18 PROCEDURE — 87493 C DIFF AMPLIFIED PROBE: CPT | Performed by: INTERNAL MEDICINE

## 2021-01-18 PROCEDURE — 83735 ASSAY OF MAGNESIUM: CPT | Performed by: INTERNAL MEDICINE

## 2021-01-18 PROCEDURE — 99232 SBSQ HOSP IP/OBS MODERATE 35: CPT | Performed by: INTERNAL MEDICINE

## 2021-01-18 RX ORDER — METOCLOPRAMIDE HYDROCHLORIDE 5 MG/ML
10 INJECTION INTRAMUSCULAR; INTRAVENOUS ONCE AS NEEDED
Status: CANCELLED | OUTPATIENT
Start: 2021-01-18

## 2021-01-18 RX ORDER — HYDROMORPHONE HCL/PF 1 MG/ML
0.2 SYRINGE (ML) INJECTION
Status: CANCELLED | OUTPATIENT
Start: 2021-01-18

## 2021-01-18 RX ORDER — CEFAZOLIN SODIUM 1 G/50ML
1000 SOLUTION INTRAVENOUS ONCE
Status: DISCONTINUED | OUTPATIENT
Start: 2021-01-18 | End: 2021-01-20

## 2021-01-18 RX ORDER — DIPHENHYDRAMINE HYDROCHLORIDE 50 MG/ML
12.5 INJECTION INTRAMUSCULAR; INTRAVENOUS ONCE AS NEEDED
Status: CANCELLED | OUTPATIENT
Start: 2021-01-18

## 2021-01-18 RX ORDER — ONDANSETRON 2 MG/ML
4 INJECTION INTRAMUSCULAR; INTRAVENOUS ONCE AS NEEDED
Status: CANCELLED | OUTPATIENT
Start: 2021-01-18

## 2021-01-18 RX ORDER — FENTANYL CITRATE/PF 50 MCG/ML
50 SYRINGE (ML) INJECTION
Status: CANCELLED | OUTPATIENT
Start: 2021-01-18

## 2021-01-18 RX ORDER — HYDROMORPHONE HCL/PF 1 MG/ML
0.5 SYRINGE (ML) INJECTION
Status: CANCELLED | OUTPATIENT
Start: 2021-01-18

## 2021-01-18 RX ORDER — LIDOCAINE HYDROCHLORIDE 10 MG/ML
0.5 INJECTION, SOLUTION EPIDURAL; INFILTRATION; INTRACAUDAL; PERINEURAL ONCE AS NEEDED
Status: CANCELLED | OUTPATIENT
Start: 2021-01-18

## 2021-01-18 RX ADMIN — ENOXAPARIN SODIUM 70 MG: 80 INJECTION SUBCUTANEOUS at 08:00

## 2021-01-18 RX ADMIN — METOPROLOL SUCCINATE 50 MG: 50 TABLET, EXTENDED RELEASE ORAL at 08:03

## 2021-01-18 RX ADMIN — ATORVASTATIN CALCIUM 20 MG: 20 TABLET, FILM COATED ORAL at 17:30

## 2021-01-18 RX ADMIN — RANOLAZINE 1000 MG: 500 TABLET, FILM COATED, EXTENDED RELEASE ORAL at 08:01

## 2021-01-18 RX ADMIN — ENOXAPARIN SODIUM 70 MG: 80 INJECTION SUBCUTANEOUS at 20:55

## 2021-01-18 RX ADMIN — PRASUGREL HYDROCHLORIDE 10 MG: 10 TABLET, FILM COATED ORAL at 08:01

## 2021-01-18 RX ADMIN — Medication 250 MG: at 08:03

## 2021-01-18 RX ADMIN — ASPIRIN 81 MG: 81 TABLET, CHEWABLE ORAL at 08:03

## 2021-01-18 RX ADMIN — Medication 250 MG: at 17:30

## 2021-01-18 RX ADMIN — LOSARTAN POTASSIUM 25 MG: 25 TABLET, FILM COATED ORAL at 08:03

## 2021-01-18 RX ADMIN — RANOLAZINE 1000 MG: 500 TABLET, FILM COATED, EXTENDED RELEASE ORAL at 17:30

## 2021-01-18 RX ADMIN — AMIODARONE HYDROCHLORIDE 200 MG: 200 TABLET ORAL at 07:55

## 2021-01-18 RX ADMIN — DICLOFENAC SODIUM 2 G: 10 GEL TOPICAL at 21:08

## 2021-01-18 RX ADMIN — PANTOPRAZOLE SODIUM 40 MG: 40 TABLET, DELAYED RELEASE ORAL at 06:01

## 2021-01-18 RX ADMIN — FUROSEMIDE 40 MG: 40 TABLET ORAL at 08:03

## 2021-01-18 NOTE — ASSESSMENT & PLAN NOTE
on 01/11/2021; patient underwent left heart catheterization with placement of 2 drug-eluting stents  · Currently on DAPT therapy, statin and BB  · Appreciate cardiology input

## 2021-01-18 NOTE — ASSESSMENT & PLAN NOTE
Recent CT obtained for abdominal pain shows possible colitis with likely infectious etiology  · Reportedly did not have any diarrhea per [employer]  · Was recently on abx for aspiration PNA  · If develops diarrhea, will check C diff  · Supportive care

## 2021-01-18 NOTE — ANESTHESIA PREPROCEDURE EVALUATION
Procedure:  CARDIAC EPS/PACER IMPLANT    Relevant Problems   ANESTHESIA (within normal limits)      CARDIO   (+) Acute combined systolic and diastolic congestive heart failure (HCC)   (+) Asymptomatic bilateral carotid artery stenosis   (+) Atrial fibrillation with rapid ventricular response (HCC)   (+) Coronary artery disease involving native coronary artery of native heart with angina pectoris (HCC)   (+) Essential hypertension   (+) Hyperlipidemia   (+) Musculoskeletal chest pain   (+) NSTEMI (non-ST elevated myocardial infarction) ([address] )      ENDO (within normal limits)      GI/HEPATIC   (+) GERD (gastroesophageal reflux disease)      /RENAL (within normal limits)      GYN (within normal limits)      HEMATOLOGY (within normal limits)      MUSCULOSKELETAL (within normal limits)      NEURO/PSYCH   (+) 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)      PULMONARY   (+) Aspiration pneumonia (HCC)      Other   (+) Elevated troponin   (+) Encounter for insertion of cardiac resynchronization therapy pacemaker (CRT-P)        Physical Exam    Airway    Mallampati score: II  TM Distance: >3 FB  Neck ROM: full     Dental   No notable dental hx     Cardiovascular  Rhythm: regular, Rate: normal, Cardiovascular exam normal    Pulmonary  Pulmonary exam normal Breath sounds clear to auscultation,     Other Findings        Anesthesia Plan  ASA Score- 3     Anesthesia Type- IV sedation with anesthesia with ASA Monitors  Additional Monitors:   Airway Plan:           Plan Factors-    Chart reviewed  EKG reviewed  Existing labs reviewed  Patient summary reviewed  Patient is not a current smoker  Induction- intravenous  Postoperative Plan- Plan for postoperative opioid use  Informed Consent- Anesthetic plan and risks discussed with patient  I personally reviewed this patient with the CRNA  Discussed and agreed on the Anesthesia Plan with the CRNA  Nadiya Ruff           Recent labs personally reviewed:  Lab Results   Component Value Date    WBC 10 07 01/18/2021    HGB 10 8 (L) 01/18/2021     01/18/2021     Lab Results   Component Value Date     12/24/2015    K 4 1 01/18/2021    BUN 12 01/18/2021    CREATININE 0 71 01/18/2021    GLUCOSE 97 12/24/2015     Lab Results   Component Value Date    PTT 21 (L) 01/11/2021      Lab Results   Component Value Date    INR 0 91 01/11/2021       Blood type A    Lab Results   Component Value Date    HGBA1C 5 5 02/05/2019       I, Nico Veliz MD, have personally seen and evaluated the patient prior to anesthetic care  I have reviewed the pre-anesthetic record, and other medical records if appropriate to the anesthetic care  If a CRNA is involved in the case, I have reviewed the CRNA assessment, if present, and agree  Risks/benefits and alternatives discussed with patient including possible PONV, sore throat, and possibility of rare anesthetic and surgical emergencies

## 2021-01-18 NOTE — H&P
H&P- Rosie Conner 1940, 79 y o  female MRN: [id number]    Unit/Bed#: [facility] 730-01 Encounter: [id number]    Primary Care Provider: Bi Andre MD   Date and time admitted to hospital: 1/17/2021  7:59 PM        * Encounter for insertion of cardiac resynchronization therapy pacemaker (CRT-P)  Assessment & Plan   Patient  Admitted to [address] [place] [address] for chest pain; noted to have elevated troponins and underwent left heart catheterization with  two drug-eluting stents placed to left main and LAD  Unfortunately, while there, patient also noted to have pneumonia and remained in the hospital to complete antibiotic therapy  This morning, patient noted to have atrial fibrillation with sinus pause; EP examined patient and determine that she would benefit from ppm placement  Patient transferred to [facility] for EP evaluation and potential pacer implantation  Patient is currently NPO at midnight; EP fellow recommends continuation of DA PT therapy  Patient currently anticoagulated on 1 make per take Lovenox; last dose tonight prior to surgery      Labs and vital stable; patient on telemetry   continue amiodarone and beta-blocker; consult to EP    Coronary artery disease involving native coronary artery of native heart with angina pectoris [facility])  Assessment & Plan   On 01/11/2021; patient underwent left heart catheterization with placement of 2 drug-eluting stents  Currently on DAPT therapy and discussed with EP who recommended continuing DAPT therapy prior to ppm placement   patient denies chest pain;  Vitals and labs stable   continue DA PT therapy and statin; followed by Cardiology    Atrial fibrillation with rapid ventricular response ([facility] [address] )  Assessment & Plan   Currently not rate controlled; continue beta-blocker, amiodarone  Adding IV metoprolol PRN   telemetry and evaluation by EP   currently anticoagulated on 1 make per cake Lovenox daily; to receive 1 more dose tonight and will hold in the morning for possible pacemaker placement   does not appear to be on [facility] [place] by [facility] in the outpatient setting; discuss with Cardiology and consider transition to  NOAC versus warfarin    Acute combined systolic and diastolic congestive heart failure ([address] )  Assessment & Plan  Wt Readings from Last 3 Encounters:   01/17/21 70 5 kg (155 lb 6 8 oz)   01/16/21 65 3 kg (143 lb 15 4 oz)   01/05/21 63 1 kg (139 lb 3 2 oz)      patient denies shortness of breath, dyspnea on exertion, swelling, or orthopnea; echo with EF of 45% and diffuse hypokinesis   daily weights/ins and outs    Fluid and salt restricted diet        Hyperlipidemia  Assessment & Plan   Continue statin therapy    Colitis  Assessment & Plan   Recent CT obtained for abdominal pain shows possible colitis with likely infectious etiology   transferring facility made clear during transferred that she does not have diarrhea and antibiotics were stopped prior to transfer  completed 3 days of oral vanc before discontinuation; monitor and restart antibiotics as needed with C diff testing   of note; patient seems fixated on her bowel movements; will add Imodium and reassure    GERD (gastroesophageal reflux disease)  Assessment & Plan   PPI    Essential hypertension  Assessment & Plan   Blood pressure well controlled on admission, continue present therapy      VTE Prophylaxis: Enoxaparin (Lovenox)  / sequential compression device   Code Status:   Full  POLST: POLST form is not discussed and not completed at this time  Discussion with family: Discussed treatment plan with family and patient who agree with current plan; encouraged to ask questions and participate  Anticipated Length of Stay:  Patient will be admitted on an Inpatient basis with an anticipated length of stay of   Greater than 2 midnights  Justification for Hospital Stay:  Cardiac arrhythmia    Total Time for Visit, including Counseling / Coordination of Care: 45 minutes    Greater than 50% of this total time spent on direct patient counseling and coordination of care  Chief Complaint:    Afib RVR with sinus pause    History of Present Illness:    Gucci Richardson is a 79 y o  female  With past medical history of hypertension, coronary artery disease status post 4 stents, aspiration pneumonia, heart failure with preserved ejection fraction, hypertension, hyperlipidemia, GERD who presents from [facility] to be evaluated by EP and have pacemaker placed  Patient originally presented to [facility] for chest pain; underwent heart catheterization and received 2 drug-eluting stents on 01/11/2021  Will there, also noted to have pneumonia and completed antibiotics as inpatient  Patient was to be discharged today; however noted to have new onset atrial fibrillation and 2nd sinus pause  Evaluated by EP while at [facility] it was determined she would benefit from pacemaker placement; patient transferred to [facility]  Patient's only complaint is loose bowels; she reports approximately 2 soft bowel movements daily and was recently on oral vancomycin for presumed  Infectious diarrhea; however  Antibiotics were stopped prior to transfer and transferring team reported that this  Soft stool was not infectious  She denies chest pain, headaches, nausea / vomiting, shortness of breath or any other acute complaints  Does have highly variable heart rate and EP has been consulted  Review of Systems:    Review of Systems   Constitutional: Negative for chills and fever  HENT: Negative for ear pain and sore throat  Eyes: Negative for pain and visual disturbance  Respiratory: Negative for cough and shortness of breath  Cardiovascular: Negative for chest pain and palpitations  Gastrointestinal: Positive for diarrhea  Negative for abdominal pain and vomiting  Genitourinary: Negative for dysuria and hematuria  Musculoskeletal: Negative for arthralgias and back pain     Skin: Negative for color change and rash  Neurological: Negative for seizures and syncope  Psychiatric/Behavioral: Positive for agitation  All other systems reviewed and are negative  Past Medical and Surgical History:     Past Medical History:   Diagnosis Date    Anal fissure     Cardiac disorder     Esophageal reflux     Esophagitis, reflux     Hemorrhoids     Hepatic hemangioma     Last Assessed: 1/13/2015    Herpes zoster     History of colonic polyps     Hypertension     Ischemic colitis ([facility] [address] )     Lumbar herniated disc     Malignant neoplasm without specification of site ([facility] )     Nephrolithiasis     L  Lithotripsy    Nontoxic single thyroid nodule     Last Assessed: 1/13/2015    Osteoarthritis     Overactive bladder     Raynaud disease     Respiratory system disease     [facility]'s [facility]     Spinal stenosis     PONCHO (stress urinary incontinence, female)     Uterovaginal prolapse     Grade I-II       Past Surgical History:   Procedure Laterality Date    APPENDECTOMY  1947    CARDIAC SURGERY      CABG    CATARACT EXTRACTION Bilateral     COLONOSCOPY  2012    Fiberoptic    COLONOSCOPY      Resolved: 2006 - 2012 5 year f/u    CORONARY ANGIOPLASTY WITH STENT PLACEMENT      CORONARY ARTERY BYPASS GRAFT      Resolved: 2012    ESOPHAGOGASTRODUODENOSCOPY  2012    Diagnostic    HEMORROIDECTOMY      KNEE SURGERY      LITHOTRIPSY      Renal    MALIGNANT SKIN LESION EXCISION      Face; Resolved: 2004    VA ESOPHAGOGASTRODUODENOSCOPY TRANSORAL DIAGNOSTIC N/A 4/13/2016    Procedure: EGD AND COLONOSCOPY;  Surgeon: Greg Barnett MD;  [facility]: [facility]; Service: Gastroenterology    RENAL ARTERY STENT      SKIN LESION EXCISION      Scalp    SOFT TISSUE TUMOR RESECTION      Shoulder; Resolved: 1995    THROMBOLYSIS      Postoperative Thrombolysis PTCA    TONSILLECTOMY      Resolved: 1944       Meds/Allergies:    Prior to Admission medications    Medication Sig Start Date End Date Taking?  Authorizing Provider   amLODIPine (NORVASC) 10 mg tablet Take 1 tablet (10 mg total) by mouth daily 12/24/20   Emily Zaragoza PA-C   aspirin (ECOTRIN LOW STRENGTH) 81 mg EC tablet Take 81 mg by mouth daily  Historical Provider, MD   atorvastatin (LIPITOR) 20 mg tablet Take 1 tablet (20 mg total) by mouth daily with dinner 12/5/20   Edel Montoya DO   docusate sodium (Colace) 100 mg capsule Take 1 capsule (100 mg total) by mouth 2 (two) times a day 12/5/20   Edel Montoya DO   furosemide (LASIX) 40 mg tablet Take 1 tablet (40 mg total) by mouth daily 12/6/20   Edel Montoya DO   isosorbide mononitrate (IMDUR) 30 mg 24 hr tablet Take 3 tablets (90 mg total) by mouth daily 1/7/21   Boy Stein MD   metoprolol succinate (TOPROL-XL) 50 mg 24 hr tablet Take 1 tablet (50 mg total) by mouth daily 1/7/21   Boy Stein MD   multivitamin [facility]) TABS Take 1 tablet by mouth daily  Historical Provider, MD   nitroglycerin (NITROSTAT) 0 4 mg SL tablet PLACE 1 TABLET (0 4 MG TOTAL) UNDER THE TONGUE EVERY 5 (FIVE) MINUTES AS NEEDED FOR CHEST PAIN 1/6/21   Boy Stein MD   pantoprazole (PROTONIX) 40 mg tablet Take 1 tablet (40 mg total) by mouth daily in the early morning 12/24/20   Emily Zaragoza PA-C   polyethylene glycol (MIRALAX) 17 g packet Take 17 g by mouth daily 12/6/20   Edel Montoya DO   prasugrel (EFFIENT) tablet TAKE 1 TABLET EVERY OTHER DAY 12/27/20   Boy Stein MD   ranolazine (RANEXA) 1000 MG SR tablet Take 1,000 mg by mouth 2 (two) times a day    Historical Provider, MD   senna (SENOKOT) 8 6 mg Take 1 tablet (8 6 mg total) by mouth daily at bedtime 12/5/20   Edel Montoya DO     I have reviewed home medications with patient personally  Allergies:    Allergies   Allergen Reactions    Sulfa Antibiotics Anaphylaxis    Formaldehyde     Codeine Palpitations       Social History:     Marital Status: /Civil Union   Occupation:   Retired  Patient Pre-hospital Living Situation:  Lives with   Patient Pre-hospital Level of Mobility:  full  Patient Pre-hospital Diet Restrictions:  none  Substance Use History:   Social History     Substance and Sexual Activity   Alcohol Use Yes    Frequency: Monthly or less    Comment: social     Social History     Tobacco Use   Smoking Status Never Smoker   Smokeless Tobacco Never Used     Social History     Substance and Sexual Activity   Drug Use No       Family History:    Family History   Problem Relation Age of Onset    Heart disease Mother     Hypertension Mother     Osteoporosis Mother     Heart disease Father     Hypertension Father     Ulcerative colitis Family     Colon polyps Family        Physical Exam:     Vitals:   Blood Pressure: 113/69 (01/17/21 2232)  Pulse: (!) 111 (01/17/21 2232)  Temperature: 98 °F (36 7 °C) (01/17/21 2232)  Respirations: 18 (01/17/21 2232)  Height: 4' 10" (147 3 cm) (01/17/21 2002)  Weight - Scale: 70 5 kg (155 lb 6 8 oz) (01/17/21 2002)  SpO2: 96 % (01/17/21 2232)    Physical Exam  Vitals signs and nursing note reviewed  Constitutional:       General: She is not in acute distress  Appearance: She is well-developed  HENT:      Head: Normocephalic and atraumatic  Eyes:      Conjunctiva/sclera: Conjunctivae normal    Neck:      Musculoskeletal: Neck supple  Cardiovascular:      Rate and Rhythm: Tachycardia present  Rhythm irregular  Heart sounds: No murmur  Pulmonary:      Effort: Pulmonary effort is normal  No respiratory distress  Breath sounds: Normal breath sounds  Abdominal:      Palpations: Abdomen is soft  Tenderness: There is no abdominal tenderness  Skin:     General: Skin is warm and dry  Neurological:      Mental Status: She is alert and oriented to person, place, and time  Additional Data:     Lab Results: I have personally reviewed pertinent reports        Results from last 7 days   Lab Units 01/17/21  0520 01/14/21  1411   WBC Thousand/uL 8 72 10 95* HEMOGLOBIN g/dL 11 3* 11 4*   HEMATOCRIT % 34 8 34 9   PLATELETS Thousands/uL 230 218   BANDS PCT % 3  --    NEUTROS PCT %  --  82*   LYMPHS PCT %  --  8*   LYMPHO PCT % 19  --    MONOS PCT %  --  7   MONO PCT % 11  --    EOS PCT % 1 1     Results from last 7 days   Lab Units 01/17/21  0520   SODIUM mmol/L 137   POTASSIUM mmol/L 3 7   CHLORIDE mmol/L 102   CO2 mmol/L 24   BUN mg/dL 14   CREATININE mg/dL 0 99   ANION GAP mmol/L 11   CALCIUM mg/dL 8 6   ALBUMIN g/dL 2 7*   TOTAL BILIRUBIN mg/dL 0 65   ALK PHOS U/L 55   ALT U/L 48   AST U/L 68*   GLUCOSE RANDOM mg/dL 113     Results from last 7 days   Lab Units 01/11/21  1248   INR  0 91     Results from last 7 days   Lab Units 01/13/21  2049 01/13/21  1454 01/13/21  0123   POC GLUCOSE mg/dl 102 117 129         Results from last 7 days   Lab Units 01/14/21  0607 01/13/21  1918 01/13/21  1521 01/13/21  1519 01/13/21  0647 01/12/21  0635 01/11/21  1248   LACTIC ACID mmol/L 1 0 1 4  --  2 1*  --   --   --    PROCALCITONIN ng/ml 0 52*  --  1 35*  --  0 83* 0 71* <0 05       Imaging: I have personally reviewed pertinent reports  No orders to display       EKG, Pathology, and Other Studies Reviewed on Admission:   · EKG:   Atrial fibrillation with rapid ventricular response  Left axis deviation  Left bundle branch block  Abnormal ECG  When compared with ECG of 14-JAN-2021 21:17,  Atrial fibrillation has replaced Sinus rhythm  Vent  rate has increased BY  77 BPM  Confirmed by Gabriel Mcgill ([id number]) on 1/17/2021 11:09:34 AM    Allscripts / Epic Records Reviewed: Yes     ** Please Note: This note has been constructed using a voice recognition system   **

## 2021-01-18 NOTE — PLAN OF CARE
Problem: OCCUPATIONAL THERAPY ADULT  Goal: Performs self-care activities at highest level of function for planned discharge setting  See evaluation for individualized goals  Description: Treatment Interventions: ADL retraining, Functional transfer training, Endurance training, Patient/family training, Equipment evaluation/education, Compensatory technique education, Energy conservation, Activityengagement          See flowsheet documentation for full assessment, interventions and recommendations  Note: Limitation: Decreased ADL status, Decreased endurance, Decreased self-care trans, Decreased high-level ADLs  Prognosis: Good  Assessment: Pt is a 79 y o  female who was admitted to [facility] on 1/17/2021 with Encounter for insertion of cardiac resynchronization therapy pacemaker (CRT-P)   Pt's problem list also includes PMH of HTN, previous surgery and cardiac disorder, cognitive changes, reflux, herpes zoster, ischemic colitis, lumbar herniated disc, raynaud's disease, sjogrens disease, spinal stenosis  At baseline pt was completing adls and mobility independently - shares iadls with spouse  Pt lives with spouse in 1 story home with 2 LUDWIN  Currently pt requires moderate to max assist for overall ADLS and mod a x 2  for functional mobility/transfers  Pt currently presents with impairments in the following categories -steps to enter environment, difficulty performing ADLS, difficulty performing IADLS  and environment activity tolerance, endurance and standing balance/tolerance  These impairments, as well as pt's fatigue, risk for falls and home environment  limit pt's ability to safely engage in all baseline areas of occupation, includingbathing, dressing, toileting, functional mobility/transfers, community mobility, laundry , driving, house maintenance, meal prep, cleaning, social participation  and leisure activities  From OT standpoint, recommend inpt rehab upon D/C   OT will continue to follow to address the below stated goals        OT Discharge Recommendation: Post-Acute Rehabilitation Services

## 2021-01-18 NOTE — ASSESSMENT & PLAN NOTE
Currently not rate controlled; continue beta-blocker, amiodarone  Adding IV metoprolol PRN   telemetry and evaluation by EP   currently anticoagulated on 1 make per cake Lovenox daily; to receive 1 more dose tonight and will hold in the morning for possible pacemaker placement   does not appear to be on [facility] in the outpatient setting; discuss with Cardiology and consider transition to  NOAC versus warfarin

## 2021-01-18 NOTE — ASSESSMENT & PLAN NOTE
Recent CT obtained for abdominal pain shows possible colitis with likely infectious etiology   transferring facility made clear during transferred that she does not have diarrhea and antibiotics were stopped prior to transfer  completed 3 days of oral vanc before discontinuation; monitor and restart antibiotics as needed with C diff testing   of note; patient seems fixated on her bowel movements; will add Imodium and reassure

## 2021-01-18 NOTE — CASE MANAGEMENT
LOS 1   Unplanned readmission risk score: 19  Not a bundle      Met with pt to discuss the role of CM and to discuss any help pt may need prior to dc  Pt lives with her  Senia Perkins in a ranch home with basement with 2 LUDWIN  Pt performed ADL's indptly pta, no use of DME  Pt has a hx of HHC but does not remember the agency  Pt does her own cooking, cleaning, and medication management  Pt has a built in seat and grab bars in the bathroom that she utilizes  Pt has a walker and cane at home to use if needed  No hx of rehab  No hx of mental health or D&A treatment  Pt's PCP is Dr Carla Linton  Pt's preferred pharmacy is [facility] in [address] [place] [address]  Patient drives  CM discussed therapies recommendation for rehab; pt is agreeable  Pt requesting CM call her family to discuss  Messages left for pt's  Senia Perkins and daughter Don Pabon to discuss  Contact: Senia Perkins () [phone] and Yasir Gillian (daughter) [phone]  Pt reports having a living will--document requested  No POA  Senia Perkins will transport home     CM reviewed d/c planning process including the following: identifying help at home, patient preference for d/c planning needs, Discharge Lounge, [facility] Meds to Bed program, availability of treatment team to discuss questions or concerns patient and/or family may have regarding understanding medications and recognizing signs and symptoms once discharged  CM also encouraged patient to follow up with all recommended appointments after discharge  Patient advised of importance for patient and family to participate in managing patients medical well being  Patient/caregiver received discharge checklist  Content reviewed  Patient/caregiver encouraged to participate in discharge plan of care prior to discharge home

## 2021-01-18 NOTE — CONSULTS
Consultation - Electrophysiology   Arthur Villa 79 y o  female MRN: [id number]  Unit/Bed#: [facility] 730-01 Encounter: [id number]      Inpatient consult to Electrophysiology  Consult performed by: Oseas Dowling PA-C  Consult ordered by: Graciela Bahena MD          History of Present Illness   Physician Requesting Consult: Graciela Bahena MD  Reason for Consult / Principal Problem: Atrial fibrillation with RVR, device evaluation    Assessment/Plan   Assessment:  1  Symptomatic newly diagnosed atrial fibrillation  - was on Lovenox / [id number] score of 6 (age, sex, CAD, CHF, HTN)  - rate control: on hold currently due to pauses / had been on metoprolol succinate 50mg daily as an outpatient  - antiarrhythmic therapy: none  - prior cardioversion/ablation: none  2  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- seen to have a 4 second conversion pause  3  Left bundle-branch block at baseline  4  Moderate pulmonary hypertension  5  Essential hypertension  - maintained on amlodipine 10mg daily and metoprolol   6  HFmEF  - diuretic regimen of furosemide 40mg daily  - EF had been 55% per echo 5/2019 / now down to 45% per echo 12/3/2020   7  History of CAD  - prior CABG (LIMA to LAD, SVG to D1, SVG to OM1, SVG to RCA) 2011 / revascularization of LAD in diagonal branch with PCI in late 2011  - now status post LAMINE to proximal LAD and ostial left main on 1/11/2021  - maintained on aspirin, statin, beta blocker, and Effient / also on Ranexa and Imdur  8  Hyperlipidemia  9  Bilateral renal artery stenosis  10  Sjogren's disease? Plan:  1  Given her significant pause and the need for beta-blocker, recommendation had been to undergo pacemaker implantation  With her EF of 45%, it was felt that she would benefit from dual-chamber with left posterior fascicle lead  This was discussed with the patient and also her family  Everyone was in agreement  Unfortunately, patient has been experiencing diarrhea    Therefore, patient was unable to undergo procedure today   Will reach out to the primary team to ask for help on treatment of this  In the interim, patient is still being maintained on Lovenox  She is stable currently  However, will discuss with attending if beta-blocker should be held until device implantation or she can tolerate low-dose metoprolol given recent stents  HPI: Jenni Olivares is a 79y o  year old female with symptomatic new onset atrial fibrillation tachy-jillian syndrome, left bundle-branch baseline, moderate pulmonary hypertension, essential hypertension heart failure with mid-range EF recently down, history of CAD with prior CABG and stents, hyperlipidemia, and bilateral renal artery stenosis  Patient follows primarily with Dr Kadeem Hooker as an outpatient  She has had prior CABG with quadruple bypass in 2011  Later on that same year she did have revascularization done to LAD in the diagonal branch  This has been ago for some years  However, in December she reported more issues with chest pain and was asked to go to the emergency room for further workup  At first, less invasive approach was preferred and patient was sent home with nitro and treated for volume overload  EF was found to be 45%, a drop since prior echo showed EF of 55%  However, as her chest pain persisted, she presented back to the emergency room in January of this year at which point it was recommended that she undergo cardiac catheterization to assess her anatomy  Patient had preferred to wait but as she continued to decompensate, she eventually underwent cardiac catheterization on 01/11 and had drug-eluting stents placed to proximal LAD and ostial left main  She tolerated the catheterization well and was near approaching discharge status when yesterday 1/17 she developed atrial fibrillation with heart rates in the 160s  She was given IV metoprolol and digoxin and was eventually given amiodarone for which she converted with a 6 second pause    She was not symptomatic with this  Amiodarone continue to be loaded  It was felt that she would need a device and she was therefore transferred for further evaluation by EP  Today, she is resting comfortably in her chair  She does not offer any complaints of chest pain, lightheadedness, or dizziness  On telemetry she is in sinus rhythm currently  AV dev blocking agents are being held  Patient does believe her brother has a pacemaker as well  She has been  for 61 years, has two children with many great and grandchildren  TELE:   Conversion pause at [facility] -       EKG:     Atrial fibrillation -       Review of Systems  ROS as noted above, otherwise 12 point review of systems was performed and is negative  Historical Information   Past Medical History:   Diagnosis Date    Anal fissure     Cardiac disorder     Cognitive changes 12/23/2020    Esophageal reflux     Esophagitis, reflux     Hemorrhoids     Hepatic hemangioma     Last Assessed: 1/13/2015    Herpes zoster     History of colonic polyps     Hypertension     Ischemic colitis ([address]     Lumbar herniated disc     Malignant neoplasm without specification of site ([address] )     Nephrolithiasis     L   Lithotripsy    Nontoxic single thyroid nodule     Last Assessed: 1/13/2015    Osteoarthritis     Overactive bladder     Raynaud disease     Respiratory system disease     Sjogren's disease ([address] )     Spinal stenosis     PONCHO (stress urinary incontinence, female)     Uterovaginal prolapse     Grade I-II     Past Surgical History:   Procedure Laterality Date    APPENDECTOMY  1947    CARDIAC SURGERY      CABG    CATARACT EXTRACTION Bilateral     COLONOSCOPY  2012    Fiberoptic    COLONOSCOPY      Resolved: 2006 - 2012 5 year f/u    CORONARY ANGIOPLASTY WITH STENT PLACEMENT      CORONARY ARTERY BYPASS GRAFT      Resolved: 2012    ESOPHAGOGASTRODUODENOSCOPY  2012    Diagnostic    HEMORROIDECTOMY      KNEE SURGERY      LITHOTRIPSY      Renal    MALIGNANT SKIN LESION EXCISION      Face; Resolved: 2004    IN ESOPHAGOGASTRODUODENOSCOPY TRANSORAL DIAGNOSTIC N/A 4/13/2016    Procedure: EGD AND COLONOSCOPY;  Surgeon: Angela Shepard MD;  [facility];   Service: Gastroenterology    RENAL ARTERY STENT      SKIN LESION EXCISION      Scalp    SOFT TISSUE TUMOR RESECTION      Shoulder; Resolved: 1995    THROMBOLYSIS      Postoperative Thrombolysis PTCA    TONSILLECTOMY      Resolved: 1944     Social History     Substance and Sexual Activity   Alcohol Use Yes    Frequency: Monthly or less    Comment: social     Social History     Substance and Sexual Activity   Drug Use No     Social History     Tobacco Use   Smoking Status Never Smoker   Smokeless Tobacco Never Used     Family History:   Family History   Problem Relation Age of Onset    Heart disease Mother     Hypertension Mother     Osteoporosis Mother     Heart disease Father     Hypertension Father     Ulcerative colitis Family     Colon polyps Family        Meds/Allergies   Hospital Medications:   Current Facility-Administered Medications   Medication Dose Route Frequency    acetaminophen (TYLENOL) tablet 650 mg  650 mg Oral Q4H PRN    aluminum-magnesium hydroxide-simethicone (MYLANTA) oral suspension 30 mL  30 mL Oral Q4H PRN    amiodarone tablet 200 mg  200 mg Oral Daily With Breakfast    aspirin chewable tablet 81 mg  81 mg Oral Daily    atorvastatin (LIPITOR) tablet 20 mg  20 mg Oral Daily With Dinner    Diclofenac Sodium (VOLTAREN) 1 % topical gel 2 g  2 g Topical 4x Daily    enoxaparin (LOVENOX) subcutaneous injection 70 mg  1 mg/kg Subcutaneous Q12H SHELLEY    furosemide (LASIX) tablet 40 mg  40 mg Oral Daily    loperamide (IMODIUM) capsule 2 mg  2 mg Oral TID PRN    losartan (COZAAR) tablet 25 mg  25 mg Oral Daily    metoprolol succinate (TOPROL-XL) 24 hr tablet 50 mg  50 mg Oral Daily    nitroglycerin (NITROSTAT) SL tablet 0 4 mg  0 4 mg Sublingual Q5 Min PRN    ondansetron (ZOFRAN) injection 4 mg  4 mg Intravenous Q6H PRN    oxyCODONE (ROXICODONE) IR tablet 2 5 mg  2 5 mg Oral Q4H PRN    oxyCODONE (ROXICODONE) IR tablet 5 mg  5 mg Oral Q4H PRN    pantoprazole (PROTONIX) EC tablet 40 mg  40 mg Oral Early Morning    prasugrel (EFFIENT) tablet 10 mg  10 mg Oral Daily    ranolazine (RANEXA) 12 hr tablet 1,000 mg  1,000 mg Oral BID    saccharomyces boulardii (FLORASTOR) capsule 250 mg  250 mg Oral BID    simethicone (MYLICON) chewable tablet 80 mg  80 mg Oral 4x Daily PRN     Home Medications:   Medications Prior to Admission   Medication    amLODIPine (NORVASC) 10 mg tablet    aspirin (ECOTRIN LOW STRENGTH) 81 mg EC tablet    atorvastatin (LIPITOR) 20 mg tablet    docusate sodium (Colace) 100 mg capsule    furosemide (LASIX) 40 mg tablet    isosorbide mononitrate (IMDUR) 30 mg 24 hr tablet    metoprolol succinate (TOPROL-XL) 50 mg 24 hr tablet    multivitamin (THERAGRAN) TABS    nitroglycerin (NITROSTAT) 0 4 mg SL tablet    pantoprazole (PROTONIX) 40 mg tablet    polyethylene glycol (MIRALAX) 17 g packet    prasugrel (EFFIENT) tablet    ranolazine (RANEXA) 1000 MG SR tablet    senna (SENOKOT) 8 6 mg       Allergies   Allergen Reactions    Sulfa Antibiotics Anaphylaxis    Formaldehyde     Codeine Palpitations       Objective   Vitals: Blood pressure 135/69, pulse 79, temperature 97 9 °F (36 6 °C), temperature source Oral, resp  rate 20, height 4' 10" (1 473 m), weight 71 kg (156 lb 8 oz), SpO2 93 %, not currently breastfeeding    Orthostatic Blood Pressures      Most Recent Value   Blood Pressure  135/69 filed at 01/18/2021 3420   Patient Position - Orthostatic VS  Lying filed at 01/18/2021 0752          No intake or output data in the 24 hours ending 01/18/21 1119    Invasive Devices     Peripheral Intravenous Line            Peripheral IV 01/14/21 Right Forearm 3 days                Physical Exam   GEN: NAD, alert and oriented, well appearing  SKIN: dry without significant lesions or rashes  HEENT: NCAT, PERRL, EOMs intact  NECK: No JVD or carotid bruits appreciated  CARDIOVASCULAR: RRR, normal S1, S2 without murmurs, rubs, or gallops appreciated  LUNGS: Clear to auscultation bilaterally without wheezes, rhonchi, or rales, +atelectasis of lower lobes bilaterally   ABDOMEN: Soft, nontender, nondistended  EXTREMITIES/VASCULAR: perfused without clubbing, cyanosis, or edema b/l  PSYCH: Normal mood and affect  NEURO: CN ll-Xll grossly intact    Lab Results: I have personally reviewed pertinent lab results  Results from last 7 days   Lab Units 21  0823 21  0520 21  1411   WBC Thousand/uL 10 07 8 72 10 95*   HEMOGLOBIN g/dL 10 8* 11 3* 11 4*   HEMATOCRIT % 33 4* 34 8 34 9   PLATELETS Thousands/uL 224 230 218     Results from last 7 days   Lab Units 21  0822 21  0520 01/15/21  1032   POTASSIUM mmol/L 4 1 3 7 3 2*   CHLORIDE mmol/L 106 102 102   CO2 mmol/L 25 24 25   BUN mg/dL 12 14 20   CREATININE mg/dL 0 71 0 99 1 13   CALCIUM mg/dL 8 5 8 6 8 2*     Results from last 7 days   Lab Units 21  1248   INR  0 91   PTT seconds 21*     Results from last 7 days   Lab Units 21  0822 21  0520 21  1208   MAGNESIUM mg/dL 1 9 2 0 2 2       Imaging: I have personally reviewed pertinent reports      ECHO:   Results for orders placed during the hospital encounter of 20   Echo complete with contrast if indicated    Narrative [facility] 175  [facility] 210 [facility]  [phone]    Transthoracic Echocardiogram  2D, M-mode, Doppler, and Color Doppler    Study date:  03-Dec-2020    Patient: Janina Vaughn  MR number: [id number]  Account number: [de-identified]  : 1940  Age: 79 years  Gender: Female  Status: Inpatient  Location: Bedside  Height: 60 in  Weight: 136 lb  BP: 99/ 53 mmHg    Indications: Heart Failure    Diagnoses: I50 9 - Heart failure, unspecified    Sonographer:  Jayce Santacruz RDCS  Primary Physician:  Wicho Bass MD  Referring Physician:  Cathleen Dickerson DO  Group:  [facility]  Interpreting Physician:  Kennedy Horvath MD    SUMMARY    LEFT VENTRICLE:  Systolic function was mildly reduced  Ejection fraction was estimated to be 45 %  There was mild diffuse hypokinesis  Wall thickness was mildly to moderately increased  There was mild concentric hypertrophy  Doppler parameters were consistent with abnormal left ventricular relaxation (grade 1 diastolic dysfunction)  VENTRICULAR SEPTUM:  There was paradoxical motion  These changes are consistent with LBBB  LEFT ATRIUM:  The atrium was mildly dilated  MITRAL VALVE:  There was mild to moderate regurgitation  TRICUSPID VALVE:  There was mild regurgitation  Estimated peak PA pressure was 55 mmHg  The findings suggest moderate pulmonary hypertension  PULMONIC VALVE:  There was trace regurgitation  HISTORY: PRIOR HISTORY: CAD, Hypertension, GERD, Hyperlipidemia, CP, Stent, CABG, Raynaud's    PROCEDURE: The procedure was performed at the bedside  This was a routine study  The transthoracic approach was used  The study included complete 2D imaging, M-mode, complete spectral Doppler, and color Doppler  The heart rate was 82 bpm,  at the start of the study  Images were obtained from the parasternal, apical, subcostal, and suprasternal notch acoustic windows  Intravenous contrast ( 0 6ml Definity in NSS) was administered to opacify the left ventricle  Echocardiographic views were limited due to decreased penetration and lung interference  This was a technically difficult study  LEFT VENTRICLE: Size was normal  Systolic function was mildly reduced  Ejection fraction was estimated to be 45 %  There was mild diffuse hypokinesis  Wall thickness was mildly to moderately increased  There was mild concentric  hypertrophy   DOPPLER: Doppler parameters were consistent with abnormal left ventricular relaxation (grade 1 diastolic dysfunction)  VENTRICULAR SEPTUM: There was paradoxical motion  These changes are consistent with LBBB  RIGHT VENTRICLE: The size was normal  Systolic function was normal  Wall thickness was normal     LEFT ATRIUM: The atrium was mildly dilated  RIGHT ATRIUM: Size was normal     MITRAL VALVE: Valve structure was normal  There was normal leaflet separation  DOPPLER: The transmitral velocity was within the normal range  There was no evidence for stenosis  There was mild to moderate regurgitation  AORTIC VALVE: The valve was trileaflet  Leaflets exhibited normal thickness and normal cuspal separation  DOPPLER: Transaortic velocity was within the normal range  There was no evidence for stenosis  There was no regurgitation  TRICUSPID VALVE: The valve structure was normal  There was normal leaflet separation  DOPPLER: The transtricuspid velocity was within the normal range  There was no evidence for stenosis  There was mild regurgitation  Estimated peak PA  pressure was 55 mmHg  The findings suggest moderate pulmonary hypertension  PULMONIC VALVE: Leaflets exhibited normal thickness, no calcification, and normal cuspal separation  DOPPLER: The transpulmonic velocity was within the normal range  There was trace regurgitation  PERICARDIUM: There was no pericardial effusion  The pericardium was normal in appearance  AORTA: The root exhibited normal size      SYSTEM MEASUREMENT TABLES    2D  %FS: 17 94 %  Ao Diam: 2 72 cm  Ao asc: 2 46 cm  EDV(Teich): 55 79 ml  EF(Teich): 38 05 %  ESV(Teich): 34 57 ml  IVSd: 1 08 cm  LA Area: 16 14 cm2  LA Diam: 3 04 cm  LVEDV MOD A4C: 52 91 ml  LVEF MOD A4C: 38 16 %  LVESV MOD A4C: 32 72 ml  LVIDd: 3 64 cm  LVIDs: 2 98 cm  LVLd A4C: 6 09 cm  LVLs A4C: 5 64 cm  LVPWd: 0 93 cm  RA Area: 7 24 cm2  RVIDd: 2 34 cm  SV MOD A4C: 20 19 ml  SV(Teich): 21 23 ml    CW  TR Vmax: 3 73 m/s  TR maxP 52 mmHg    MM  TAPSE: 2 12 cm    PW  E' Sept: 0 04 m/s  E/E' Sept: 21 24  MV A [place]: 1 42 m/s  MV Dec [place]: 4 42 m/s2  MV DecT: 191 8 ms  MV E [place]: 0 85 m/s  MV E/A Ratio: 0 6  MV PHT: 55 62 ms  MVA By PHT: 3 96 cm2    [facility]    Prepared and electronically signed by    Kisha Liu MD  Signed 03-Dec-2020 13:58:50          Cardiac testing:   ECHO:   Results for orders placed during the hospital encounter of 20   Echo complete with contrast if indicated    Narrative [address]  [address]  [phone]    Transthoracic Echocardiogram  2D, M-mode, Doppler, and Color Doppler    Study date:  03-Dec-2020    Patient: Carl Allen  MR number: [id number]  Account number: [de-identified]  : 1940  Age: 79 years  Gender: Female  Status: Inpatient  Location: Bedside  Height: 60 in  Weight: 136 lb  BP: 99/ 53 mmHg    Indications: Heart Failure    Diagnoses: I50 9 - Heart failure, unspecified    Sonographer:  Yuniel Mccord RDCS  Primary Physician:  Jason Schrader MD  Referring Physician:  Hanh Kline DO  Group[facility]  [facility]  Interpreting Physician:  Kisha Liu MD    SUMMARY    LEFT VENTRICLE:  Systolic function was mildly reduced  Ejection fraction was estimated to be 45 %  There was mild diffuse hypokinesis  Wall thickness was mildly to moderately increased  There was mild concentric hypertrophy  Doppler parameters were consistent with abnormal left ventricular relaxation (grade 1 diastolic dysfunction)  VENTRICULAR SEPTUM:  There was paradoxical motion  These changes are consistent with LBBB  LEFT ATRIUM:  The atrium was mildly dilated  MITRAL VALVE:  There was mild to moderate regurgitation  TRICUSPID VALVE:  There was mild regurgitation  Estimated peak PA pressure was 55 mmHg  The findings suggest moderate pulmonary hypertension      PULMONIC VALVE:  There was trace regurgitation  HISTORY: PRIOR HISTORY: CAD, Hypertension, GERD, Hyperlipidemia, CP, Stent, CABG, Raynaud's    PROCEDURE: The procedure was performed at the bedside  This was a routine study  The transthoracic approach was used  The study included complete 2D imaging, M-mode, complete spectral Doppler, and color Doppler  The heart rate was 82 bpm,  at the start of the study  Images were obtained from the parasternal, apical, subcostal, and suprasternal notch acoustic windows  Intravenous contrast ( 0 6ml Definity in NSS) was administered to opacify the left ventricle  Echocardiographic views were limited due to decreased penetration and lung interference  This was a technically difficult study  LEFT VENTRICLE: Size was normal  Systolic function was mildly reduced  Ejection fraction was estimated to be 45 %  There was mild diffuse hypokinesis  Wall thickness was mildly to moderately increased  There was mild concentric  hypertrophy  DOPPLER: Doppler parameters were consistent with abnormal left ventricular relaxation (grade 1 diastolic dysfunction)  VENTRICULAR SEPTUM: There was paradoxical motion  These changes are consistent with LBBB  RIGHT VENTRICLE: The size was normal  Systolic function was normal  Wall thickness was normal     LEFT ATRIUM: The atrium was mildly dilated  RIGHT ATRIUM: Size was normal     MITRAL VALVE: Valve structure was normal  There was normal leaflet separation  DOPPLER: The transmitral velocity was within the normal range  There was no evidence for stenosis  There was mild to moderate regurgitation  AORTIC VALVE: The valve was trileaflet  Leaflets exhibited normal thickness and normal cuspal separation  DOPPLER: Transaortic velocity was within the normal range  There was no evidence for stenosis  There was no regurgitation  TRICUSPID VALVE: The valve structure was normal  There was normal leaflet separation   DOPPLER: The transtricuspid velocity was within the normal range  There was no evidence for stenosis  There was mild regurgitation  Estimated peak PA  pressure was 55 mmHg  The findings suggest moderate pulmonary hypertension  PULMONIC VALVE: Leaflets exhibited normal thickness, no calcification, and normal cuspal separation  DOPPLER: The transpulmonic velocity was within the normal range  There was trace regurgitation  PERICARDIUM: There was no pericardial effusion  The pericardium was normal in appearance  AORTA: The root exhibited normal size  SYSTEM MEASUREMENT TABLES    2D  %FS: 17 94 %  Ao Diam: 2 72 cm  Ao asc: 2 46 cm  EDV(Teich): 55 79 ml  EF(Teich): 38 05 %  ESV(Teich): 34 57 ml  IVSd: 1 08 cm  LA Area: 16 14 cm2  LA Diam: 3 04 cm  LVEDV MOD A4C: 52 91 ml  LVEF MOD A4C: 38 16 %  LVESV MOD A4C: 32 72 ml  LVIDd: 3 64 cm  LVIDs: 2 98 cm  LVLd A4C: 6 09 cm  LVLs A4C: 5 64 cm  LVPWd: 0 93 cm  RA Area: 7 24 cm2  RVIDd: 2 34 cm  SV MOD A4C: 20 19 ml  SV(Teich): 21 23 ml    CW  TR Vmax: 3 73 m/s  TR maxP 52 mmHg    MM  TAPSE: 2 12 cm    PW  E' Sept: 0 04 m/s  E/E' Sept: 21 24  MV A Nicola: 1 42 m/s  MV Dec [place]: 4 42 m/s2  MV DecT: 191 8 ms  MV E Nicola: 0 85 m/s  MV E/A Ratio: 0 6  MV PHT: 55 62 ms  MVA By PHT: 3 96 cm2    [facility] Accredited Echocardiography Laboratory    Prepared and electronically signed by    Stefani Hernandez MD  Signed 03-Dec-2020 13:58:50       No results found for this or any previous visit  CATH:  No results found for this or any previous visit  STRESS TEST:  No results found for this or any previous visit      VTE Prophylaxis: Sequential compression device (Venodyne)

## 2021-01-18 NOTE — ASSESSMENT & PLAN NOTE
New onset as of 1/17 while at [facility]  · Cardiology saw patient   She was given amiodarone and placed on lovenox SC  · EP to discuss PPM with family  · Will likely need triple therapy given new onset AF (and recent LAMINE placement)  · monitor on telemetry

## 2021-01-18 NOTE — ASSESSMENT & PLAN NOTE
BP currently acceptable  · Continue cozaar 25 mg daily, lasix 40 mg daily  · Is also on BB, will defer to cardiology/EP in regards to holding

## 2021-01-18 NOTE — QUICK NOTE
After long discussion with patient's daughter, Leah Jacob, family is declining any proposed procedure until they have all questions answered by the EP  Physician practitioner that will actually be inserting the device, once/if PPM placement is confirmed  Have stated that they do not consent to proceed with surgery tomorrow  Will discontinue NPO at midnight; will continue therapeutic Lovenox  After reviewing the chart, if EP wishes to proceed with pacemaker placement; please discuss with patient's daughter Leah Jacob  at [phone]

## 2021-01-18 NOTE — ASSESSMENT & PLAN NOTE
Patient was admitted to [address] [place] [address] for chest pain; noted to have elevated troponins and underwent left heart catheterization with two LAMINE placed to left main and LAD on 1/11  Patient also had aspiration PNA and completed antibiotics course   Was then noted to have atrial fibrillation with sinus pause; EP examined patient and determine that she would benefit from PPM, transferred to [facility]  · Currently NPO, EP to discuss with family as apparently was some uncertainty regarding procedure  · Continue on telemetry

## 2021-01-18 NOTE — DISCHARGE INSTRUCTIONS
"Astigmatism is a vision condition that causes blurred vision due either to the irregular shape of the cornea, the clear front cover of the eye, or sometimes the curvature of the lens inside the eye. An irregular shaped cornea or lens prevents light from focusing properly on the retina, the light sensitive surface at the back of the eye. As a result, vision becomes blurred at any distance.    Astigmatism is a very common vision condition. Most people have some degree of astigmatism. Slight amounts of astigmatism usually don't affect vision and don't require treatment. However, larger amounts cause distorted or blurred vision, eye discomfort and headaches.    Astigmatism frequently occurs with other vision conditions like nearsightedness (myopia) and farsightedness (hyperopia). Together these vision conditions are referred to as refractive errors because they affect how the eyes bend or "refract" light.  The specific cause of astigmatism is unknown. It can be hereditary and is usually present from birth. It can change as a child grows and may decrease or worsen over time.    A comprehensive optometric examination will include testing for astigmatism. Depending on the amount present, your optometrist can provide eyeglasses or contact lenses that correct the astigmatism by altering the way light enters your eyes.        " Please refer to post pacemaker implantation discharge instructions and restrictions and your pacemaker booklet/temporary card  Keep incision dry for one week  Do not use lotions/powders/creams on incision  Leave outer bandage in place for 1 week - it is water proof, and as long as it is fully adhered to your skin you may shower with it  If it appears as though the bandage is coming off and/or there is any communication to the area of device incision, please then keep the whole area dry for the remaining week  After 1 week, please remove by pulling all edges away from the center of the bandage  No overhead reaching/pushing/pulling/lifting greater than 5-10lbs with left arm for six weeks  Please call the office if you notice redness, swelling, bleeding, or drainage from incision or if you develop fevers  AFTER PACEMAKER CARE:    If you have any questions, please call [phone] to speak with a nurse (8:30am-4pm, or [phone] after hours)  For appointments, please call [phone]  WHAT YOU SHOULD KNOW:   A pacemaker is a small, battery-powered device that is placed under your skin in your upper chest area with wires placed through a vein that lead directly into the heart  It helps regulate your heart rate and prevent your heart from beating too slowly  AFTER YOU LEAVE:     Medicines:     · Pain medicine: You may need medicine to take away or decrease pain  ¨ Learn how to take your medicine  Ask what medicine and how much you should take  Be sure you know how, when, and how often to take it  Usually Over the counter pain medicine is sufficient to control pain (Acetominophen or Ibuprofen) Ask your doctor if you may take these  If this does not control your pain, narcotic pain killers may be prescribed, please call if you need prescription  ¨ Do not wait until the pain is severe before you take your medicine  Tell caregivers if your pain does not decrease      ¨ Pain medicine can make you dizzy or sleepy  Prevent falls by calling someone when you get out of bed or if you need help  Take your medicine as directed  Call your healthcare provider if you think your medicine is not helping or if you have side effects  Tell him if you are allergic to any medicine  Follow up with your cardiologist after your procedure: You will need a follow-up visit approximately 2 weeks after you leave the hospital  Your cardiologist will check your wound and make sure that your pacemaker is working correctly  Follow the instructions to check your pacemaker: Your cardiologist or primary healthcare provider will check your pacemaker and the battery regularly  He will use a computer to check your pacemaker over the telephone or wireless device which will be given to you  Pacemaker batteries usually last 8 to 10 years  The pacemaker unit will be replaced when the battery gets low  This is a simpler procedure than the original one to implant your pacemaker  Wound care:  Keep your incision dry for one week  Do not use lotions/powders/creams on incision  Leave outer bandage in place for 1 week - it is water proof, and as long as it is fully adhered to your skin you may shower with it  If it appears as though the bandage is coming off and/or there is any communication to the area of device incision, please then keep the whole area dry for the remaining week  After 1 week, please remove by pulling all edges away from the center of the bandage  Please call the office if you notice redness, swelling, bleeding, or drainage from incision or if you develop fevers  Activity:   · Arm movement and lifting:  Be careful using the arm on the side of your pacemaker  Do not move your arm for the first 24 hours after your procedure  Do not  lift your arm above your shoulder or lift more than 10 pounds for one month after your procedure   Avoid pushing, pulling, or repetitive arm movements for one month  This helps the leads stay in place and helps your wound heal  Ask your caregiver when you can drive after your procedure  You may move your arm side to side without lifting above your shoulder, and do not need to wear a sling at home  · Driving: you are ok to drive 48 hours after pacemaker is implanted   · Sports:  Ask your caregiver when it is okay to play tennis, golf, basketball, or any sport that requires you to lift your arms  Do not play full contact sports, such as football, that could damage your pacemaker  Ask your cardiologist or primary healthcare provider how much and what kinds of physical activity are safe for you  Living with a pacemaker:   · Tell all caregivers you have a pacemaker: This includes surgeons, radiologists, and medical technicians  You may want to wear a medical alert ID bracelet or necklace that states that you have a pacemaker  · Carry your pacemaker ID card: Make sure you receive a pacemaker ID card  Carry it with you at all times  It lists important information about your pacemaker  Show it to airport security if you travel  · Avoid electrical interference:  Avoid welding equipment and other equipment with large magnets or electric fields  These things could interfere with how your pacemaker works  Use your cell phone on the ear opposite from your pacemaker  Do not carry your cell phone in your shirt pocket over your chest      · Some Pacemakers are MRI safe  Ask you doctor if it is safe to proceed with MRI and let the radiologist and staff know you have a pacemaker  · Do not touch the skin around your pacemaker: This can cause damage to the lead wires or move the pacemaker unit from where it should be  Contact your cardiologist or primary healthcare provider if:   · The area around your pacemaker has increasing amount of pain after surgery  The pain should improve over first few days after implantation       · The skin around your stitches has increasing redness, swelling, or has drainage  This may mean that you have an infection  · You have a fever  · You have chills, a cough, and feel weak or achy  These are also signs of infection  · Your feet or ankles are more swollen than your baseline  · Your Heart rate is less than 50 beats per minute     Seek care immediately if:   · Your bandage becomes soaked with blood  · Your pacemaker is swelling rapidly    · Your stitches open up  · You feel your heart suddenly beating very slowly or quickly  · You become too weak or dizzy to stand, or you pass out  · Your arm or leg feels warm, tender, and painful  It may look swollen and red  · You have chest pain that does not go away with rest or medicine  · You feel lightheaded, short of breath, and have chest pain  · You cough up blood  © 2014 [address] is for End User's use only and may not be sold, redistributed or otherwise used for commercial purposes  All illustrations and images included in CareNotes® are the copyrighted property of [employer]  or Martin Florez  The above information is an  only  It is not intended as medical advice for individual conditions or treatments  Talk to your doctor, nurse or pharmacist before following any medical regimen to see if it is safe and effective for you

## 2021-01-18 NOTE — OCCUPATIONAL THERAPY NOTE
Occupational Therapy Evaluation     Patient Name: Dev COOPER Date: 1/18/2021  Problem List  Principal Problem:    Encounter for insertion of cardiac resynchronization therapy pacemaker (CRT-P)  Active Problems:    Essential hypertension    GERD (gastroesophageal reflux disease)    Colitis    Hyperlipidemia    Acute combined systolic and diastolic congestive heart failure (HCC)    Atrial fibrillation with rapid ventricular response ([facility] )    Coronary artery disease involving native coronary artery of native heart with angina pectoris [facility])    Past Medical History  Past Medical History:   Diagnosis Date    Anal fissure     Cardiac disorder     Cognitive changes 12/23/2020    Esophageal reflux     Esophagitis, reflux     Hemorrhoids     Hepatic hemangioma     Last Assessed: 1/13/2015    Herpes zoster     History of colonic polyps     Hypertension     Ischemic colitis ([facility] )     Lumbar herniated disc     [facility]     Nephrolithiasis     L   Lithotripsy    Nontoxic single thyroid nodule     Last Assessed: 1/13/2015    Osteoarthritis     Overactive bladder     Raynaud disease     Respiratory system disease     Sjogren's disease ([facility] )     Spinal stenosis     PONCHO (stress urinary incontinence, female)     Uterovaginal prolapse     Grade I-II     Past Surgical History  Past Surgical History:   Procedure Laterality Date    APPENDECTOMY  1947    CARDIAC SURGERY      CABG    CATARACT EXTRACTION Bilateral     COLONOSCOPY  2012    Fiberoptic    COLONOSCOPY      Resolved: 2006 - 2012 5 year f/u    CORONARY ANGIOPLASTY WITH STENT PLACEMENT      CORONARY ARTERY BYPASS GRAFT      Resolved: 2012    ESOPHAGOGASTRODUODENOSCOPY  2012    Diagnostic    HEMORROIDECTOMY      KNEE SURGERY      LITHOTRIPSY      Renal    MALIGNANT SKIN LESION EXCISION      Face; Resolved: 2004    ME ESOPHAGOGASTRODUODENOSCOPY TRANSORAL DIAGNOSTIC N/A 4/13/2016 Procedure: EGD AND COLONOSCOPY;  Surgeon: Jena Jackson MD;  [facility]; Service: Gastroenterology    RENAL ARTERY STENT      SKIN LESION EXCISION      Scalp    SOFT TISSUE TUMOR RESECTION      Shoulder; Resolved: 1995    THROMBOLYSIS      Postoperative Thrombolysis PTCA    TONSILLECTOMY      Resolved: 1944 01/18/21 1120   OT Last Visit   OT Visit Date 01/18/21   Note Type   Note type Evaluation   Restrictions/Precautions   Weight Bearing Precautions Per Order No   Other Precautions Chair Alarm; Bed Alarm;O2;Fall Risk   Pain Assessment   Pain Assessment Tool Pain Assessment not indicated - pt denies pain   Home Living   Type of [address] One level;Stairs to enter with rails   Bathroom Shower/Tub Tub/shower unit   Bathroom Toilet Standard   Bathroom Equipment Shower chair;Grab bars in [facility];Cane   Prior Function   Level of Cross Independent with ADLs and functional mobility   Lives With [place] Help From Family   ADL Assistance Independent   IADLs Independent   Falls in the last 6 months 0   Vocational Retired   [address] and mobility w/o ad - i iadls - shares homemaking with spouse   Reciprocal Relationships supportive family    Service to Others retired   Intrinsic Gratification mostly sedentary    Subjective   Subjective c/o weakness   ADL   Eating Assistance 5  Supervision/Setup   Grooming Assistance 5  Supervision/Setup   [address]  [address] [facility] 2  [address]  [address] 2  [facility]  2  Maximal Assistance   Bed Mobility   Additional Comments pt oob on commode    Transfers   Sit to Stand 3  Moderate assistance   Additional items Assist x 2   Stand to Sit 3  Moderate assistance   Additional items Assist x 2   Stand pivot 3  Moderate assistance   Additional items Assist x 2 Toilet transfer 3  Moderate assistance   Additional items Assist x 2   Balance   Static Sitting Fair   Dynamic Sitting Fair -   Static Standing Poor +   Dynamic Standing Poor   Ambulatory Poor   Activity Tolerance   Activity Tolerance Patient limited by fatigue;Treatment limited secondary to medical complications (Comment)   RUE Assessment   RUE Assessment WFL   LUE Assessment   LUE Assessment WFL   Cognition   Arousal/Participation Alert; Cooperative   Attention Attends with cues to redirect   Orientation Level Oriented X4   Memory Decreased recall of precautions   Following Commands Follows one step commands without difficulty   Assessment   Limitation Decreased ADL status; Decreased endurance;Decreased self-care trans;Decreased high-level ADLs   Prognosis Good   Assessment Pt is a 79 y o  female who was admitted to [facility] on 1/17/2021 with Encounter for insertion of cardiac resynchronization therapy pacemaker (CRT-P)   Pt's problem list also includes PMH of HTN, previous surgery and cardiac disorder, cognitive changes, reflux, herpes zoster, ischemic colitis, lumbar herniated disc, raynaud's disease, sjogrens disease, spinal stenosis  At baseline pt was completing adls and mobility independently - shares iadls with spouse  Pt lives with spouse in 1 story home with 2 LUDWIN  Currently pt requires moderate to max assist for overall ADLS and mod a x 2  for functional mobility/transfers  Pt currently presents with impairments in the following categories -steps to enter environment, difficulty performing ADLS, difficulty performing IADLS  and environment activity tolerance, endurance and standing balance/tolerance   These impairments, as well as pt's fatigue, risk for falls and home environment  limit pt's ability to safely engage in all baseline areas of occupation, includingbathing, dressing, toileting, functional mobility/transfers, community mobility, laundry , driving, house maintenance, meal prep, cleaning, social participation  and leisure activities  From OT standpoint, recommend inpt rehab upon D/C  OT will continue to follow to address the below stated goals  Goals   Patient Goals get stronger    LTG Time Frame 10-14   Long Term Goal #1 refer to established goals below    Plan   Treatment Interventions ADL retraining;Functional transfer training; Endurance training;Patient/family training;Equipment evaluation/education; Compensatory technique education; Energy conservation; Activityengagement   Goal Expiration Date 02/01/21   OT Frequency 3-5x/wk   Recommendation   OT Discharge Recommendation Post-Acute Rehabilitation Services   AM-PAC Daily Activity Inpatient   Lower Body Dressing 2   Bathing 2   Toileting 2   Upper Body Dressing 3   Grooming 3   Eating 3   Daily Activity Raw Score 15   Daily Activity Standardized Score (Calc for Raw Score >=11) 34 69   AM-PAC Applied Cognition Inpatient   Following a Speech/Presentation 3   Understanding Ordinary Conversation 4   Taking Medications 4   Remembering Where Things Are Placed or Put Away 3   Remembering List of 4-5 Errands 3   Taking Care of Complicated Tasks 3   Applied Cognition Raw Score 20   Applied Cognition Standardized Score 41 76   Barthel Index   Feeding 5   Bathing 0   Grooming Score 0   Dressing Score 5   Bladder Score 5   Bowels Score 10   Toilet Use Score 5   Transfers (Bed/Chair) Score 5   Mobility (Level Surface) Score 0   Stairs Score 0   Barthel Index Score 35       OCCUPATIONAL THERAPY GOALS:    *Mod I adls after setup with use of AE PRN  *Mod I toileting and clothing management   *Mod I functional mobility and transfers to/from all surfaces with good dynamic balance and safety for participation in dynamic adls and iadl tasks   *Demonstrate good carryover with safe use of RW during functional tasks   *Assess DME needs   *Increase activity tolerance to 35-40 minutes for participation in adls and enjoyable activities  *Assist with safe d/c recommendations     Suzan Reed, OT

## 2021-01-18 NOTE — ASSESSMENT & PLAN NOTE
On 01/11/2021; patient underwent left heart catheterization with placement of 2 drug-eluting stents  Currently on DAPT therapy and discussed with EP who recommended continuing DAPT therapy prior to ppm placement   patient denies chest pain;  Vitals and labs stable   continue DA PT therapy and statin; followed by Cardiology

## 2021-01-18 NOTE — ASSESSMENT & PLAN NOTE
Wt Readings from Last 3 Encounters:   01/18/21 71 kg (156 lb 8 oz)   01/16/21 65 3 kg (143 lb 15 4 oz)   01/05/21 63 1 kg (139 lb 3 2 oz)     Patient denies shortness of breath, dyspnea on exertion, swelling, or orthopnea; echo with EF of 45% and diffuse hypokinesis  · Daily weights, Intake/Output  · At [place] was on IV lasix, then transitioned to PO lasix 40 mg daily as of 1/17

## 2021-01-18 NOTE — PHYSICAL THERAPY NOTE
Physical Therapy Evaluation    Patient Name: Shannan Archibald    XWHLN'P Date: 1/18/2021     Problem List  Principal Problem:    Encounter for insertion of cardiac resynchronization therapy pacemaker (CRT-P)  Active Problems:    Essential hypertension    GERD (gastroesophageal reflux disease)    Colitis    Hyperlipidemia    Acute combined systolic and diastolic congestive heart failure (HCC)    Atrial fibrillation with rapid ventricular response ([facility] )    Coronary artery disease involving native coronary artery of native heart with angina pectoris [facility])       Past Medical History  Past Medical History:   Diagnosis Date    Anal fissure     Cardiac disorder     Cognitive changes 12/23/2020    Esophageal reflux     Esophagitis, reflux     Hemorrhoids     Hepatic hemangioma     Last Assessed: 1/13/2015    Herpes zoster     History of colonic polyps     Hypertension     Ischemic colitis ([facility] )     Lumbar herniated disc     Malignant neoplasm without specification of site ([facility] )     Nephrolithiasis     L   Lithotripsy    Nontoxic single thyroid nodule     Last Assessed: 1/13/2015    Osteoarthritis     Overactive bladder     Raynaud disease     Respiratory system disease     Sjogren's disease ([facility] )     Spinal stenosis     PONCHO (stress urinary incontinence, female)     Uterovaginal prolapse     Grade I-II        Past Surgical History  Past Surgical History:   Procedure Laterality Date    APPENDECTOMY  1947    CARDIAC SURGERY      CABG    CATARACT EXTRACTION Bilateral     COLONOSCOPY  2012    Fiberoptic    COLONOSCOPY      Resolved: 2006 - 2012 5 year f/u    CORONARY ANGIOPLASTY WITH STENT PLACEMENT      CORONARY ARTERY BYPASS GRAFT      Resolved: 2012    ESOPHAGOGASTRODUODENOSCOPY  2012    Diagnostic    HEMORROIDECTOMY      KNEE SURGERY      LITHOTRIPSY      Renal    MALIGNANT SKIN LESION EXCISION      Face; Resolved: 2004    CA ESOPHAGOGASTRODUODENOSCOPY TRANSORAL DIAGNOSTIC N/A 4/13/2016    Procedure: EGD AND COLONOSCOPY;  Surgeon: Felisa Rojas MD;  [facility]: [facility]; Service: Gastroenterology    RENAL ARTERY STENT      SKIN LESION EXCISION      Scalp    SOFT TISSUE TUMOR RESECTION      Shoulder; Resolved: 1995    THROMBOLYSIS      Postoperative Thrombolysis PTCA    TONSILLECTOMY      Resolved: 1944 01/18/21 1124   PT Last Visit   PT Visit Date 01/18/21   Note Type   Note type Evaluation   Pain Assessment   Pain Assessment Tool 0-10   Pain Score No Pain   Home Living   Type of [address] One level;Stairs to enter with rails   Home Equipment Cane;Walker; Other (Comment)  (owns but does not use)   Additional Comments Pt reports living with her  in a 1 SH with 2STE  Pt states her  is in good health and can assist as needed  Prior to admission the pt was independent with ADLs/IADLs and did not use any AD  The pt is retired, still drives short distances, and denies any falls in the last 6 months  Prior Function   Level of [place] Independent with ADLs and functional mobility   Lives With Spouse   Receives Help From Family   ADL Assistance Independent   IADLs Independent   Falls in the last 6 months 0   Restrictions/Precautions   Weight Bearing Precautions Per Order No   Other Precautions Chair Alarm; Bed Alarm;O2;Fall Risk  (2L of O2 via NC)   General   Family/Caregiver Present No   Cognition   Overall Cognitive Status WFL   Arousal/Participation Cooperative   Attention Within functional limits   Orientation Level Oriented to person;Oriented to place;Oriented to time   Following Commands Follows one step commands without difficulty   RLE Assessment   RLE Assessment WFL  (3+/5 grossly)   LLE Assessment   LLE Assessment WFL  (3+/5 grossly)   Coordination   Sensation WFL   Light Touch   RLE Light Touch Grossly intact   LLE Light Touch Grossly intact   Bed Mobility   Additional Comments pt presented to therapy on commode   Transfers   Sit to Stand 3  Moderate assistance   Additional items Assist x 2; Increased time required;Verbal cues   Stand to Sit 3  Moderate assistance   Additional items Assist x 2; Increased time required   Toilet transfer 3  Moderate assistance   Additional items Assist x 2; Increased time required;Commode   Ambulation/Elevation   Gait pattern Excessively slow; Short stride; Step to; Improper Weight shift; Foward flexed   Gait Assistance 3  Moderate assist   Additional items Assist x 2   Assistive Device Other (Comment)  (bilateral HHA)   Distance 3ft lateral from commode to chair   Stair Management Assistance Not tested   Balance   Static Sitting Fair -   Dynamic Sitting Fair -   Static Standing Poor +   Dynamic Standing Poor   Ambulatory Poor   Endurance Deficit   Endurance Deficit Yes   Endurance Deficit Description pt fatgiued after standing for 3 minutes x2 while performing hygiene    Activity Tolerance   Activity Tolerance Patient limited by fatigue   Medical Staff Made Aware seen with OT   Nurse Made Aware nurse approved of therapy session   Assessment   Prognosis Fair   Problem List Decreased strength;Decreased endurance; Impaired balance;Decreased mobility   Assessment Pt is a 78yo female admitted to [facility] on 1/17/21 after being transferred from [facility] where she was seen in the ED for chest pain  Dx: Encounter for insertion of cardiac resynchronization therapy pacemaker, acute CHF, CAD, colitis, and HTN  Pt presented to therapy on commode on 2L of O2 via NC  2 pt identifiers were used to confirm  The pt reports that they live with her  in a [facility] with 2STE  Prior to admission the pt was independent for ADLs and independent for IADLs  The pt reports that they do not use any AD at home, drive short distances, is retired, and have had 0 falls in the last 6 months   The pt's impairments include: decreased balance, poor activity tolerance, decreased endurance, decreased LE strength, decreased mobility, gait abnormalities, and increased risk of falls  These impairments are preventing the pt from safely ambulating in her household, returning to her hobby of golf, and returning to their PLOF  The pt will benefit from skilled physical therapy while in the hospital to address their impairments, maximize functional mobility, and work towards a safe DC  DC recommendation at this time is rehab due to her impairments and increased need for caregiver assistance  The pt was left seated upright in chair with chair alarm turned on, call bell/phone within reach, and all questions answered  Barriers to Discharge Decreased caregiver support; Inaccessible home environment   Goals   STG Expiration Date 02/01/21   Short Term Goal #1 STG1: pt will be able to perform all bed mobility independently in order to decrease need for caregiver assistance  STG2: pt will be able to perform all transfers with S using least restrictive AD in order to increase functional mobility  STG3: pt will be able to ambulate 150ft with S using least restrictive AD in order to return to household ambulator  STG4: pt will be able to navigate 2 steps with S using least restrictive AD  to ensure pt can enter house, pending pt can ambulate 150ft with [place] using RW  PT Treatment Day 0   Plan   Treatment/Interventions Functional transfer training;LE strengthening/ROM; Therapeutic exercise; Endurance training;Equipment eval/education; Bed mobility;Gait training;Spoke to nursing;OT   PT Frequency Other (Comment)  (3-5x/wk)   Recommendation   PT Discharge Recommendation Post-Acute Rehabilitation Services   Equipment Recommended Walker   PT - OK to Discharge Yes   Additional Comments to rehab when medically cleared   Modified [place] Scale   Modified Lily Scale 4   Barthel Index   Feeding 10   Bathing 0   Grooming Score 0   Dressing Score 5   Bladder Score 10   Bowels Score 5   Toilet Use Score 5   Transfers (Bed/Chair) Score 5   Mobility (Level Surface) Score 0   Stairs Score 0   Barthel Index Score 40                Otis Glaser, SPT

## 2021-01-18 NOTE — PLAN OF CARE
Problem: PAIN - ADULT  Goal: Verbalizes/displays adequate comfort level or baseline comfort level  Description: Interventions:  - Encourage patient to monitor pain and request assistance  - Assess pain using appropriate pain scale  - Administer analgesics based on type and severity of pain and evaluate response  - Implement non-pharmacological measures as appropriate and evaluate response  - Consider cultural and social influences on pain and pain management  - Notify physician/advanced practitioner if interventions unsuccessful or patient reports new pain  Outcome: Progressing     Problem: INFECTION - ADULT  Goal: Absence or prevention of progression during hospitalization  Description: INTERVENTIONS:  - Assess and monitor for signs and symptoms of infection  - Monitor lab/diagnostic results  - Monitor all insertion sites, i e  indwelling lines, tubes, and drains  - Monitor endotracheal if appropriate and nasal secretions for changes in amount and color  - [place] appropriate cooling/warming therapies per order  - Administer medications as ordered  - Instruct and encourage patient and family to use good hand hygiene technique  - Identify and instruct in appropriate isolation precautions for identified infection/condition  Outcome: Progressing  Goal: Absence of fever/infection during neutropenic period  Description: INTERVENTIONS:  - Monitor WBC    Outcome: Progressing     Problem: SAFETY ADULT  Goal: Patient will remain free of falls  Description: INTERVENTIONS:  - Assess patient frequently for physical needs  -  Identify cognitive and physical deficits and behaviors that affect risk of falls    -  [place] fall precautions as indicated by assessment   - Educate patient/family on patient safety including physical limitations  - Instruct patient to call for assistance with activity based on assessment  - Modify environment to reduce risk of injury  - Consider OT/PT consult to assist with strengthening/mobility  Outcome: Progressing  Goal: Maintain or return to baseline ADL function  Description: INTERVENTIONS:  -  Assess patient's ability to carry out ADLs; assess patient's baseline for ADL function and identify physical deficits which impact ability to perform ADLs (bathing, care of mouth/teeth, toileting, grooming, dressing, etc )  - Assess/evaluate cause of self-care deficits   - Assess range of motion  - Assess patient's mobility; develop plan if impaired  - Assess patient's need for assistive devices and provide as appropriate  - Encourage maximum independence but intervene and supervise when necessary  - Involve family in performance of ADLs  - Assess for home care needs following discharge   - Consider OT consult to assist with ADL evaluation and planning for discharge  - Provide patient education as appropriate  Outcome: Progressing  Goal: Maintain or return mobility status to optimal level  Description: INTERVENTIONS:  - Assess patient's baseline mobility status (ambulation, transfers, stairs, etc )    - Identify cognitive and physical deficits and behaviors that affect mobility  - Identify mobility aids required to assist with transfers and/or ambulation (gait belt, sit-to-stand, lift, walker, cane, etc )  - [place] fall precautions as indicated by assessment  - Record patient progress and toleration of activity level on Mobility SBAR; progress patient to next Phase/Stage  - Instruct patient to call for assistance with activity based on assessment  - Consider rehabilitation consult to assist with strengthening/weightbearing, etc   Outcome: Progressing     Problem: DISCHARGE PLANNING  Goal: Discharge to home or other facility with appropriate resources  Description: INTERVENTIONS:  - Identify barriers to discharge w/patient and caregiver  - Arrange for needed discharge resources and transportation as appropriate  - Identify discharge learning needs (meds, wound care, etc )  - Arrange for interpretive services to assist at discharge as needed  - Refer to Case Management Department for coordinating discharge planning if the patient needs post-hospital services based on physician/advanced practitioner order or complex needs related to functional status, cognitive ability, or social support system  Outcome: Progressing     Problem: Knowledge Deficit  Goal: Patient/family/caregiver demonstrates understanding of disease process, treatment plan, medications, and discharge instructions  Description: Complete learning assessment and assess knowledge base    Interventions:  - Provide teaching at level of understanding  - Provide teaching via preferred learning methods  Outcome: Progressing     Problem: CARDIOVASCULAR - ADULT  Goal: Maintains optimal cardiac output and hemodynamic stability  Description: INTERVENTIONS:  - Monitor I/O, vital signs and rhythm  - Monitor for S/S and trends of decreased cardiac output  - Administer and titrate ordered vasoactive medications to optimize hemodynamic stability  - Assess quality of pulses, skin color and temperature  - Assess for signs of decreased coronary artery perfusion  - Instruct patient to report change in severity of symptoms  Outcome: Progressing  Goal: Absence of cardiac dysrhythmias or at baseline rhythm  Description: INTERVENTIONS:  - Continuous cardiac monitoring, vital signs, obtain 12 lead EKG if ordered  - Administer antiarrhythmic and heart rate control medications as ordered  - Monitor electrolytes and administer replacement therapy as ordered  Outcome: Progressing     Problem: Prexisting or High Potential for Compromised Skin Integrity  Goal: Skin integrity is maintained or improved  Description: INTERVENTIONS:  - Identify patients at risk for skin breakdown  - Assess and monitor skin integrity  - Assess and monitor nutrition and hydration status  - Monitor labs   - Assess for incontinence   - Turn and reposition patient  - Assist with mobility/ambulation  - Relieve pressure over bony prominences  - Avoid friction and shearing  - Provide appropriate hygiene as needed including keeping skin clean and dry  - Evaluate need for skin moisturizer/barrier cream  - Collaborate with interdisciplinary team   - Patient/family teaching  - Consider wound care consult   Outcome: Progressing

## 2021-01-18 NOTE — ASSESSMENT & PLAN NOTE
Patient  Admitted to [address] [place] [address] for chest pain; noted to have elevated troponins and underwent left heart catheterization with  two drug-eluting stents placed to left main and LAD  Unfortunately, while there, patient also noted to have pneumonia and remained in the hospital to complete antibiotic therapy  This morning, patient noted to have atrial fibrillation with sinus pause; EP examined patient and determine that she would benefit from ppm placement  Patient transferred to [facility] for EP evaluation and potential pacer implantation  Patient is currently NPO at midnight; EP fellow recommends continuation of DA PT therapy  Patient currently anticoagulated on 1 make per take Lovenox; last dose tonight prior to surgery      Labs and vital stable; patient on telemetry   continue amiodarone and beta-blocker; consult to EP

## 2021-01-18 NOTE — PLAN OF CARE
Problem: PAIN - ADULT  Goal: Verbalizes/displays adequate comfort level or baseline comfort level  Description: Interventions:  - Encourage patient to monitor pain and request assistance  - Assess pain using appropriate pain scale  - Administer analgesics based on type and severity of pain and evaluate response  - Implement non-pharmacological measures as appropriate and evaluate response  - Consider cultural and social influences on pain and pain management  - Notify physician/advanced practitioner if interventions unsuccessful or patient reports new pain  Outcome: Progressing     Problem: INFECTION - ADULT  Goal: Absence or prevention of progression during hospitalization  Description: INTERVENTIONS:  - Assess and monitor for signs and symptoms of infection  - Monitor lab/diagnostic results  - Monitor all insertion sites, i e  indwelling lines, tubes, and drains  - Monitor endotracheal if appropriate and nasal secretions for changes in amount and color  - [place] appropriate cooling/warming therapies per order  - Administer medications as ordered  - Instruct and encourage patient and family to use good hand hygiene technique  - Identify and instruct in appropriate isolation precautions for identified infection/condition  Outcome: Progressing  Goal: Absence of fever/infection during neutropenic period  Description: INTERVENTIONS:  - Monitor WBC    Outcome: Progressing     Problem: SAFETY ADULT  Goal: Patient will remain free of falls  Description: INTERVENTIONS:  - Assess patient frequently for physical needs  -  Identify cognitive and physical deficits and behaviors that affect risk of falls    -  [place] fall precautions as indicated by assessment   - Educate patient/family on patient safety including physical limitations  - Instruct patient to call for assistance with activity based on assessment  - Modify environment to reduce risk of injury  - Consider OT/PT consult to assist with strengthening/mobility  Outcome: Progressing  Goal: Maintain or return to baseline ADL function  Description: INTERVENTIONS:  -  Assess patient's ability to carry out ADLs; assess patient's baseline for ADL function and identify physical deficits which impact ability to perform ADLs (bathing, care of mouth/teeth, toileting, grooming, dressing, etc )  - Assess/evaluate cause of self-care deficits   - Assess range of motion  - Assess patient's mobility; develop plan if impaired  - Assess patient's need for assistive devices and provide as appropriate  - Encourage maximum independence but intervene and supervise when necessary  - Involve family in performance of ADLs  - Assess for home care needs following discharge   - Consider OT consult to assist with ADL evaluation and planning for discharge  - Provide patient education as appropriate  Outcome: Progressing  Goal: Maintain or return mobility status to optimal level  Description: INTERVENTIONS:  - Assess patient's baseline mobility status (ambulation, transfers, stairs, etc )    - Identify cognitive and physical deficits and behaviors that affect mobility  - Identify mobility aids required to assist with transfers and/or ambulation (gait belt, sit-to-stand, lift, walker, cane, etc )  - [place] fall precautions as indicated by assessment  - Record patient progress and toleration of activity level on Mobility SBAR; progress patient to next Phase/Stage  - Instruct patient to call for assistance with activity based on assessment  - Consider rehabilitation consult to assist with strengthening/weightbearing, etc   Outcome: Progressing     Problem: DISCHARGE PLANNING  Goal: Discharge to home or other facility with appropriate resources  Description: INTERVENTIONS:  - Identify barriers to discharge w/patient and caregiver  - Arrange for needed discharge resources and transportation as appropriate  - Identify discharge learning needs (meds, wound care, etc )  - Arrange for interpretive services to assist at discharge as needed  - Refer to Case Management Department for coordinating discharge planning if the patient needs post-hospital services based on physician/advanced practitioner order or complex needs related to functional status, cognitive ability, or social support system  Outcome: Progressing     Problem: Knowledge Deficit  Goal: Patient/family/caregiver demonstrates understanding of disease process, treatment plan, medications, and discharge instructions  Description: Complete learning assessment and assess knowledge base    Interventions:  - Provide teaching at level of understanding  - Provide teaching via preferred learning methods  Outcome: Progressing     Problem: CARDIOVASCULAR - ADULT  Goal: Maintains optimal cardiac output and hemodynamic stability  Description: INTERVENTIONS:  - Monitor I/O, vital signs and rhythm  - Monitor for S/S and trends of decreased cardiac output  - Administer and titrate ordered vasoactive medications to optimize hemodynamic stability  - Assess quality of pulses, skin color and temperature  - Assess for signs of decreased coronary artery perfusion  - Instruct patient to report change in severity of symptoms  Outcome: Progressing  Goal: Absence of cardiac dysrhythmias or at baseline rhythm  Description: INTERVENTIONS:  - Continuous cardiac monitoring, vital signs, obtain 12 lead EKG if ordered  - Administer antiarrhythmic and heart rate control medications as ordered  - Monitor electrolytes and administer replacement therapy as ordered  Outcome: Progressing

## 2021-01-18 NOTE — PLAN OF CARE
Problem: PHYSICAL THERAPY ADULT  Goal: Performs mobility at highest level of function for planned discharge setting  See evaluation for individualized goals  Description: Treatment/Interventions: Functional transfer training, LE strengthening/ROM, Therapeutic exercise, Endurance training, Equipment eval/education, Bed mobility, Gait training, Spoke to nursing, OT  Equipment Recommended: Cara Gramajo       See flowsheet documentation for full assessment, interventions and recommendations  Note: Prognosis: Fair  Problem List: Decreased strength, Decreased endurance, Impaired balance, Decreased mobility  Assessment: Pt is a 80yo female admitted to [facility]"" on 1/17/21 after being transferred from [facility] where she was seen in the ED for chest pain  Dx: Encounter for insertion of cardiac resynchronization therapy pacemaker, acute CHF, CAD, colitis, and HTN  Pt presented to therapy on commode on 2L of O2 via NC  2 pt identifiers were used to confirm  The pt reports that they live with her  in a [facility] with 2STE  Prior to admission the pt was independent for ADLs and independent for IADLs  The pt reports that they do not use any AD at home, drive short distances, is retired, and have had 0 falls in the last 6 months  The pt's impairments include: decreased balance, poor activity tolerance, decreased endurance, decreased LE strength, decreased mobility, gait abnormalities, and increased risk of falls  These impairments are preventing the pt from safely ambulating in her household, returning to her hobby of golf, and returning to their PLOF  The pt will benefit from skilled physical therapy while in the hospital to address their impairments, maximize functional mobility, and work towards a safe DC  DC recommendation at this time is rehab due to her impairments and increased need for caregiver assistance   The pt was left seated upright in chair with chair alarm turned on, call bell/phone within reach, and all questions answered  Barriers to Discharge: Decreased caregiver support, Inaccessible home environment     PT Discharge Recommendation: [id number]     PT - OK to Discharge: Yes    See flowsheet documentation for full assessment

## 2021-01-18 NOTE — ASSESSMENT & PLAN NOTE
Wt Readings from Last 3 Encounters:   01/17/21 70 5 kg (155 lb 6 8 oz)   01/16/21 65 3 kg (143 lb 15 4 oz)   01/05/21 63 1 kg (139 lb 3 2 oz)      patient denies shortness of breath, dyspnea on exertion, swelling, or orthopnea; echo with EF of 45% and diffuse hypokinesis   daily weights/ins and outs    Fluid and salt restricted diet

## 2021-01-18 NOTE — PROGRESS NOTES
Progress Note - Shannan Archibald 1940, 79 y o  female MRN: [id number]    Unit/Bed#: [facility] 730-01 Encounter: [id number]    Primary Care Provider: Venita Graham MD   Date and time admitted to hospital: 1/17/2021  7:59 PM      * Encounter for insertion of cardiac resynchronization therapy pacemaker (CRT-P)  Assessment & Plan  Patient was admitted to [facility] for chest pain; noted to have elevated troponins and underwent left heart catheterization with two LAMINE placed to left main and LAD on 1/11  Patient also had aspiration PNA and completed antibiotics course  Was then noted to have atrial fibrillation with sinus pause; EP examined patient and determine that she would benefit from PPM, [facility] to [facility]  · Currently NPO, EP to discuss with family as apparently was some uncertainty regarding procedure  · Continue on telemetry    Acute combined systolic and diastolic congestive heart failure ([facility] [address] )  Assessment & Plan  Wt Readings from Last 3 Encounters:   01/18/21 71 kg (156 lb 8 oz)   01/16/21 65 3 kg (143 lb 15 4 oz)   01/05/21 63 1 kg (139 lb 3 2 oz)     Patient denies shortness of breath, dyspnea on exertion, swelling, or orthopnea; echo with EF of 45% and diffuse hypokinesis  · Daily weights, Intake/Output  · At [facility] was on IV lasix, then transitioned to PO lasix 40 mg daily as of 1/17          Coronary artery disease involving native coronary artery of native heart with angina pectoris [facility])  Assessment & Plan  on 01/11/2021; patient underwent left heart catheterization with placement of 2 drug-eluting stents  · Currently on DAPT therapy, statin and BB  · Appreciate cardiology input    Atrial fibrillation with rapid ventricular response [facility])  Assessment & Plan  New onset as of 1/17 while at [facility]  · Cardiology saw patient   She was given amiodarone and placed on lovenox SC  · EP to discuss PPM with family  · Will likely need triple therapy given new onset AF (and recent LAMINE placement)  · monitor on telemetry     Hyperlipidemia  Assessment & Plan  · Continue statin    Colitis  Assessment & Plan  Recent CT obtained for abdominal pain shows possible colitis with likely infectious etiology  · Reportedly did not have any diarrhea per SL Jon Lester  · Was recently on abx for aspiration PNA  · If develops diarrhea, will check C diff  · Supportive care    GERD (gastroesophageal reflux disease)  Assessment & Plan  · Continue PPI    Essential hypertension  Assessment & Plan  BP currently acceptable  · Continue cozaar 25 mg daily, lasix 40 mg daily  · Is also on BB, will defer to cardiology/EP in regards to holding      VTE Pharmacologic Prophylaxis:   Pharmacologic: Enoxaparin (Lovenox)  Mechanical VTE Prophylaxis in Place: Yes    Patient Centered Rounds: I have performed bedside rounds with nursing staff today  Discussions with Specialists or Other Care Team Provider: EP    Education and Discussions with Family / Patient: patient  Will allow EP team to update/speak with family today as they have specific questions for EP team per RN    Time Spent for Care: 30 minutes  More than 50% of total time spent on counseling and coordination of care as described above  Current Length of Stay: 1 day(s)    Current Patient Status: Inpatient   Certification Statement: The patient will continue to require additional inpatient hospital stay due to ? EP procedure     Discharge Plan: not yet stable  Transferred here for possible EP procedure  Code Status: Level 1 - Full Code      Subjective:   Doing well today  Just worked with PT  Denies any SOB, dizziness, lightheadedness, CP, palpitations  Objective:     Vitals:   Temp (24hrs), Av °F (36 7 °C), Min:97 6 °F (36 4 °C), Max:98 2 °F (36 8 °C)    Temp:  [97 6 °F (36 4 °C)-98 2 °F (36 8 °C)] 97 9 °F (36 6 °C)  HR:  [] 79  Resp:  [16-20] 20  BP: ()/(57-69) 135/69  SpO2:  [93 %-96 %] 93 %  Body mass index is 32 71 kg/m²       Input and Output Summary (last 24 hours):     No intake or output data in the 24 hours ending 01/18/21 1122    Physical Exam:     Physical Exam  Vitals signs and nursing note reviewed  Constitutional:       General: She is not in acute distress  Cardiovascular:      Rate and Rhythm: Normal rate  Pulmonary:      Effort: No respiratory distress  Abdominal:      General: There is no distension  Musculoskeletal:      Right lower leg: No edema  Left lower leg: No edema  Skin:     Coloration: Skin is pale  Neurological:      Mental Status: She is oriented to person, place, and time  Psychiatric:         Mood and Affect: Mood normal          Additional Data:     Labs:    Results from last 7 days   Lab Units 01/18/21  0823 01/17/21  0520 01/14/21  1411   WBC Thousand/uL 10 07 8 72 10 95*   HEMOGLOBIN g/dL 10 8* 11 3* 11 4*   HEMATOCRIT % 33 4* 34 8 34 9   PLATELETS Thousands/uL 224 230 218   NEUTROS PCT %  --   --  82*   LYMPHS PCT %  --   --  8*   LYMPHO PCT %  --  19  --    MONOS PCT %  --   --  7   MONO PCT %  --  11  --    EOS PCT %  --  1 1     Results from last 7 days   Lab Units 01/18/21  0822 01/17/21  0520   POTASSIUM mmol/L 4 1 3 7   CHLORIDE mmol/L 106 102   CO2 mmol/L 25 24   BUN mg/dL 12 14   CREATININE mg/dL 0 71 0 99   CALCIUM mg/dL 8 5 8 6   ALK PHOS U/L  --  55   ALT U/L  --  48   AST U/L  --  68*     Results from last 7 days   Lab Units 01/11/21  1248   INR  0 91       * I Have Reviewed All Lab Data Listed Above  * Additional Pertinent Lab Tests Reviewed: All Labs Within Last 24 Hours Reviewed    Imaging:    Imaging Reports Reviewed Today Include: all  Imaging Personally Reviewed by Myself Includes:  none    Recent Cultures (last 7 days):     Results from last 7 days   Lab Units 01/14/21  0025 01/13/21  1518 01/13/21  1517   BLOOD CULTURE   --  No Growth After 4 Days  No Growth After 4 Days     URINE CULTURE  No Growth <1000 cfu/mL  --   --        Last 24 Hours Medication List:   Current Facility-Administered Medications   Medication Dose Route Frequency Provider Last Rate    acetaminophen  650 mg Oral Q4H PRN Kerri Muñoz MD      aluminum-magnesium hydroxide-simethicone  30 mL Oral Q4H PRN Kerri Muñoz MD      amiodarone  200 mg Oral Daily With Breakfast Kerri Muñoz MD      aspirin  81 mg Oral Daily Kerri Muñoz MD      atorvastatin  20 mg Oral Daily With Micheline Parks MD      Diclofenac Sodium  2 g Topical 4x Daily Kerri Muñoz MD      enoxaparin  1 mg/kg Subcutaneous Q12H [address] Kerri Muñoz MD      furosemide  40 mg Oral Daily Kerri Muñoz MD      loperamide  2 mg Oral TID PRN Kerri Muñoz MD      losartan  25 mg Oral Daily Kerri Muñoz MD      metoprolol succinate  50 mg Oral Daily Kerri Muñoz MD      nitroglycerin  0 4 mg Sublingual Q5 Min PRN Kerri Muñoz MD      ondansetron  4 mg Intravenous Q6H PRN Kerri Muñoz MD      oxyCODONE  2 5 mg Oral Q4H PRN Kerri Muñoz MD      oxyCODONE  5 mg Oral Q4H PRN Kerri Muñoz MD      pantoprazole  40 mg Oral Early Morning Kerri Muñoz MD      prasugrel  10 mg Oral Daily Kerri Muñoz MD      ranolazine  1,000 mg Oral BID Kerri Muñoz MD      saccharomyces boulardii  250 mg Oral BID Kerri Muñoz MD      simethicone  80 mg Oral 4x Daily PRN Kerri Muñoz MD          Today, Patient Was Seen By: Wild Tello PA-C    ** Please Note: Dictation voice to text software may have been used in the creation of this document   **

## 2021-01-19 ENCOUNTER — ANESTHESIA EVENT (INPATIENT)
Dept: NON INVASIVE DIAGNOSTICS | Facility: HOSPITAL | Age: 81
DRG: 242 | End: 2021-01-19
Payer: MEDICARE

## 2021-01-19 ENCOUNTER — APPOINTMENT (INPATIENT)
Dept: RADIOLOGY | Facility: HOSPITAL | Age: 81
DRG: 242 | End: 2021-01-19
Payer: MEDICARE

## 2021-01-19 ENCOUNTER — APPOINTMENT (INPATIENT)
Dept: NON INVASIVE DIAGNOSTICS | Facility: HOSPITAL | Age: 81
DRG: 242 | End: 2021-01-19
Attending: INTERNAL MEDICINE
Payer: MEDICARE

## 2021-01-19 VITALS — HEART RATE: 85 BPM

## 2021-01-19 LAB
ANION GAP SERPL CALCULATED.3IONS-SCNC: 6 MMOL/L (ref 4–13)
ATRIAL RATE: 79 BPM
BUN SERPL-MCNC: 11 MG/DL (ref 5–25)
C DIFF TOX B TCDB STL QL NAA+PROBE: NEGATIVE
CALCIUM SERPL-MCNC: 8.6 MG/DL (ref 8.3–10.1)
CHLORIDE SERPL-SCNC: 105 MMOL/L (ref 100–108)
CO2 SERPL-SCNC: 24 MMOL/L (ref 21–32)
CREAT SERPL-MCNC: 0.76 MG/DL (ref 0.6–1.3)
ERYTHROCYTE [DISTWIDTH] IN BLOOD BY AUTOMATED COUNT: 14.4 % (ref 11.6–15.1)
GFR SERPL CREATININE-BSD FRML MDRD: 74 ML/MIN/1.73SQ M
GLUCOSE SERPL-MCNC: 83 MG/DL (ref 65–140)
HCT VFR BLD AUTO: 35.8 % (ref 34.8–46.1)
HGB BLD-MCNC: 11 G/DL (ref 11.5–15.4)
MCH RBC QN AUTO: 30.2 PG (ref 26.8–34.3)
MCHC RBC AUTO-ENTMCNC: 30.7 G/DL (ref 31.4–37.4)
MCV RBC AUTO: 98 FL (ref 82–98)
NRBC BLD AUTO-RTO: 0 /100 WBCS
P AXIS: 57 DEGREES
PLATELET # BLD AUTO: 217 THOUSANDS/UL (ref 149–390)
PMV BLD AUTO: 10.9 FL (ref 8.9–12.7)
POTASSIUM SERPL-SCNC: 3.7 MMOL/L (ref 3.5–5.3)
PR INTERVAL: 144 MS
PROCALCITONIN SERPL-MCNC: 0.09 NG/ML
QRS AXIS: 37 DEGREES
QRSD INTERVAL: 146 MS
QT INTERVAL: 390 MS
QTC INTERVAL: 447 MS
RBC # BLD AUTO: 3.64 MILLION/UL (ref 3.81–5.12)
SODIUM SERPL-SCNC: 135 MMOL/L (ref 136–145)
T WAVE AXIS: 136 DEGREES
VENTRICULAR RATE: 79 BPM
WBC # BLD AUTO: 7.36 THOUSAND/UL (ref 4.31–10.16)

## 2021-01-19 PROCEDURE — C1898 LEAD, PMKR, OTHER THAN TRANS: HCPCS

## 2021-01-19 PROCEDURE — 02H63JZ INSERTION OF PACEMAKER LEAD INTO RIGHT ATRIUM, PERCUTANEOUS APPROACH: ICD-10-PCS | Performed by: INTERNAL MEDICINE

## 2021-01-19 PROCEDURE — 02HK3JZ INSERTION OF PACEMAKER LEAD INTO RIGHT VENTRICLE, PERCUTANEOUS APPROACH: ICD-10-PCS | Performed by: INTERNAL MEDICINE

## 2021-01-19 PROCEDURE — 84145 PROCALCITONIN (PCT): CPT | Performed by: INTERNAL MEDICINE

## 2021-01-19 PROCEDURE — 93005 ELECTROCARDIOGRAM TRACING: CPT

## 2021-01-19 PROCEDURE — 80048 BASIC METABOLIC PNL TOTAL CA: CPT | Performed by: INTERNAL MEDICINE

## 2021-01-19 PROCEDURE — C1892 INTRO/SHEATH,FIXED,PEEL-AWAY: HCPCS | Performed by: INTERNAL MEDICINE

## 2021-01-19 PROCEDURE — 71045 X-RAY EXAM CHEST 1 VIEW: CPT

## 2021-01-19 PROCEDURE — 33208 INSRT HEART PM ATRIAL & VENT: CPT | Performed by: INTERNAL MEDICINE

## 2021-01-19 PROCEDURE — 85027 COMPLETE CBC AUTOMATED: CPT | Performed by: INTERNAL MEDICINE

## 2021-01-19 PROCEDURE — C1769 GUIDE WIRE: HCPCS | Performed by: INTERNAL MEDICINE

## 2021-01-19 PROCEDURE — 0JH606Z INSERTION OF PACEMAKER, DUAL CHAMBER INTO CHEST SUBCUTANEOUS TISSUE AND FASCIA, OPEN APPROACH: ICD-10-PCS | Performed by: INTERNAL MEDICINE

## 2021-01-19 PROCEDURE — 93010 ELECTROCARDIOGRAM REPORT: CPT | Performed by: INTERNAL MEDICINE

## 2021-01-19 PROCEDURE — 99232 SBSQ HOSP IP/OBS MODERATE 35: CPT | Performed by: INTERNAL MEDICINE

## 2021-01-19 PROCEDURE — C1785 PMKR, DUAL, RATE-RESP: HCPCS

## 2021-01-19 PROCEDURE — 99233 SBSQ HOSP IP/OBS HIGH 50: CPT | Performed by: INTERNAL MEDICINE

## 2021-01-19 RX ORDER — PROPOFOL 10 MG/ML
INJECTION, EMULSION INTRAVENOUS CONTINUOUS PRN
Status: DISCONTINUED | OUTPATIENT
Start: 2021-01-19 | End: 2021-01-19

## 2021-01-19 RX ORDER — EPHEDRINE SULFATE 50 MG/ML
INJECTION INTRAVENOUS AS NEEDED
Status: DISCONTINUED | OUTPATIENT
Start: 2021-01-19 | End: 2021-01-19

## 2021-01-19 RX ORDER — LOPERAMIDE HYDROCHLORIDE 2 MG/1
2 CAPSULE ORAL 4 TIMES DAILY PRN
Status: DISCONTINUED | OUTPATIENT
Start: 2021-01-19 | End: 2021-01-21 | Stop reason: HOSPADM

## 2021-01-19 RX ORDER — GENTAMICIN SULFATE 40 MG/ML
INJECTION, SOLUTION INTRAMUSCULAR; INTRAVENOUS CODE/TRAUMA/SEDATION MEDICATION
Status: COMPLETED | OUTPATIENT
Start: 2021-01-19 | End: 2021-01-19

## 2021-01-19 RX ORDER — CEFAZOLIN SODIUM 1 G/3ML
INJECTION, POWDER, FOR SOLUTION INTRAMUSCULAR; INTRAVENOUS AS NEEDED
Status: DISCONTINUED | OUTPATIENT
Start: 2021-01-19 | End: 2021-01-19

## 2021-01-19 RX ORDER — SODIUM CHLORIDE 9 MG/ML
INJECTION, SOLUTION INTRAVENOUS CONTINUOUS PRN
Status: DISCONTINUED | OUTPATIENT
Start: 2021-01-19 | End: 2021-01-19

## 2021-01-19 RX ORDER — LIDOCAINE HYDROCHLORIDE 10 MG/ML
INJECTION, SOLUTION EPIDURAL; INFILTRATION; INTRACAUDAL; PERINEURAL CODE/TRAUMA/SEDATION MEDICATION
Status: COMPLETED | OUTPATIENT
Start: 2021-01-19 | End: 2021-01-19

## 2021-01-19 RX ORDER — LIDOCAINE HYDROCHLORIDE AND EPINEPHRINE 10; 10 MG/ML; UG/ML
INJECTION, SOLUTION INFILTRATION; PERINEURAL CODE/TRAUMA/SEDATION MEDICATION
Status: COMPLETED | OUTPATIENT
Start: 2021-01-19 | End: 2021-01-19

## 2021-01-19 RX ORDER — PROPOFOL 10 MG/ML
INJECTION, EMULSION INTRAVENOUS AS NEEDED
Status: DISCONTINUED | OUTPATIENT
Start: 2021-01-19 | End: 2021-01-19

## 2021-01-19 RX ADMIN — PHENYLEPHRINE HYDROCHLORIDE 600 MCG: 10 INJECTION INTRAVENOUS at 15:51

## 2021-01-19 RX ADMIN — RIVAROXABAN 15 MG: 15 TABLET, FILM COATED ORAL at 18:16

## 2021-01-19 RX ADMIN — RANOLAZINE 1000 MG: 500 TABLET, FILM COATED, EXTENDED RELEASE ORAL at 21:19

## 2021-01-19 RX ADMIN — PROPOFOL 30 MG: 10 INJECTION, EMULSION INTRAVENOUS at 15:27

## 2021-01-19 RX ADMIN — ASPIRIN 81 MG: 81 TABLET, CHEWABLE ORAL at 08:48

## 2021-01-19 RX ADMIN — NOREPINEPHRINE BITARTRATE 20 MCG/MIN: 1 INJECTION, SOLUTION, CONCENTRATE INTRAVENOUS at 16:22

## 2021-01-19 RX ADMIN — LIDOCAINE HYDROCHLORIDE 20 ML: 10 INJECTION, SOLUTION EPIDURAL; INFILTRATION; INTRACAUDAL; PERINEURAL at 15:41

## 2021-01-19 RX ADMIN — SODIUM CHLORIDE: 0.9 INJECTION, SOLUTION INTRAVENOUS at 15:15

## 2021-01-19 RX ADMIN — PHENYLEPHRINE HYDROCHLORIDE 400 MCG: 10 INJECTION INTRAVENOUS at 15:34

## 2021-01-19 RX ADMIN — LIDOCAINE HYDROCHLORIDE 2 ML: 10 INJECTION, SOLUTION EPIDURAL; INFILTRATION; INTRACAUDAL; PERINEURAL at 16:05

## 2021-01-19 RX ADMIN — CEFAZOLIN 1000 MG: 1 INJECTION, POWDER, FOR SOLUTION INTRAVENOUS at 15:33

## 2021-01-19 RX ADMIN — Medication 250 MG: at 08:48

## 2021-01-19 RX ADMIN — PROPOFOL 30 MG: 10 INJECTION, EMULSION INTRAVENOUS at 15:49

## 2021-01-19 RX ADMIN — IOHEXOL 10 ML: 350 INJECTION, SOLUTION INTRAVENOUS at 15:53

## 2021-01-19 RX ADMIN — PHENYLEPHRINE HYDROCHLORIDE 400 MCG: 10 INJECTION INTRAVENOUS at 15:29

## 2021-01-19 RX ADMIN — LOPERAMIDE HYDROCHLORIDE 2 MG: 2 CAPSULE ORAL at 09:25

## 2021-01-19 RX ADMIN — LIDOCAINE HYDROCHLORIDE,EPINEPHRINE BITARTRATE 10 ML: 10; .01 INJECTION, SOLUTION INFILTRATION; PERINEURAL at 16:56

## 2021-01-19 RX ADMIN — GENTAMICIN SULFATE 80 MG: 40 INJECTION, SOLUTION INTRAMUSCULAR; INTRAVENOUS at 16:58

## 2021-01-19 RX ADMIN — PROPOFOL 20 MCG/KG/MIN: 10 INJECTION, EMULSION INTRAVENOUS at 15:27

## 2021-01-19 RX ADMIN — EPHEDRINE SULFATE 10 MG: 50 INJECTION, SOLUTION INTRAVENOUS at 15:56

## 2021-01-19 RX ADMIN — OXYCODONE HYDROCHLORIDE 5 MG: 5 TABLET ORAL at 21:18

## 2021-01-19 RX ADMIN — ATORVASTATIN CALCIUM 20 MG: 20 TABLET, FILM COATED ORAL at 18:16

## 2021-01-19 RX ADMIN — RANOLAZINE 1000 MG: 500 TABLET, FILM COATED, EXTENDED RELEASE ORAL at 08:57

## 2021-01-19 RX ADMIN — LIDOCAINE HYDROCHLORIDE 4 ML: 10 INJECTION, SOLUTION EPIDURAL; INFILTRATION; INTRACAUDAL; PERINEURAL at 15:44

## 2021-01-19 RX ADMIN — SODIUM CHLORIDE 250 ML: 0.9 INJECTION, SOLUTION INTRAVENOUS at 19:41

## 2021-01-19 RX ADMIN — PRASUGREL HYDROCHLORIDE 10 MG: 10 TABLET, FILM COATED ORAL at 11:05

## 2021-01-19 RX ADMIN — PHENYLEPHRINE HYDROCHLORIDE 400 MCG: 10 INJECTION INTRAVENOUS at 15:59

## 2021-01-19 RX ADMIN — IOHEXOL 20 ML: 350 INJECTION, SOLUTION INTRAVENOUS at 16:22

## 2021-01-19 RX ADMIN — ACETAMINOPHEN 650 MG: 325 TABLET, FILM COATED ORAL at 18:19

## 2021-01-19 RX ADMIN — PHENYLEPHRINE HYDROCHLORIDE 400 MCG: 10 INJECTION INTRAVENOUS at 15:42

## 2021-01-19 RX ADMIN — Medication 250 MG: at 18:16

## 2021-01-19 RX ADMIN — PANTOPRAZOLE SODIUM 40 MG: 40 TABLET, DELAYED RELEASE ORAL at 06:04

## 2021-01-19 RX ADMIN — IOHEXOL 10 ML: 350 INJECTION, SOLUTION INTRAVENOUS at 16:06

## 2021-01-19 RX ADMIN — IOHEXOL 10 ML: 350 INJECTION, SOLUTION INTRAVENOUS at 15:29

## 2021-01-19 RX ADMIN — PROPOFOL 30 MG: 10 INJECTION, EMULSION INTRAVENOUS at 15:41

## 2021-01-19 NOTE — RESTORATIVE TECHNICIAN NOTE
Restorative Specialist Mobility Note       Activity: Other (Comment)(Pt currently on hold for activity per RN Jay Veronica 2* Tachy HR >160's )       Aspen Ryan BS, Restorative Technician, [employer]

## 2021-01-19 NOTE — ASSESSMENT & PLAN NOTE
Recent CT obtained for abdominal pain shows possible colitis with likely infectious etiology  · Reportedly did not have any diarrhea per Ronel Russell  · Was recently on abx for aspiration PNA  · Had diarrhea on 1/18, c diff checked and negative  · Supportive care  · PRN imodium

## 2021-01-19 NOTE — ANESTHESIA PREPROCEDURE EVALUATION
Procedure:  CARDIAC PACER IMPLANT    Relevant Problems   ANESTHESIA (within normal limits)      CARDIO   (+) Acute combined systolic and diastolic congestive heart failure (HCC)   (+) Asymptomatic bilateral carotid artery stenosis   (+) Atrial fibrillation with rapid ventricular response (HCC)   (+) Coronary artery disease involving native coronary artery of native heart with angina pectoris (HCC) (- s/p CABG (LIMA to LAD, SVG to D1, SVG to OM1, SVG to RCA) 2011  - s/p revascularization of LAD in diagonal branch with PCI in late 2011  - s/p LAMINE to proximal LAD and ostial left main on 1/11/2021)   (+) Embolism and thrombosis of arteries of the lower extremities ([facility])   (+) Essential hypertension   (+) Hyperlipidemia   (+) Mesenteric artery stenosis ([facility])   (+) Musculoskeletal chest pain   (+) NSTEMI (non-ST elevated myocardial infarction) ([facility] [address] )      ENDO (within normal limits)      GI/HEPATIC   (+) GERD (gastroesophageal reflux disease)      /RENAL (within normal limits)      NEURO/PSYCH   (+) Atheroscler native arteries the extremities w/intermit claudication ([facility])      PULMONARY   (+) Aspiration pneumonia ([facility] [address] )      Other   (+) Encounter for insertion of cardiac resynchronization therapy pacemaker (CRT-P)   (+) Ischemic colitis ([facility] )      Cardiac Cath 1/11/2021:  CORONARY CIRCULATION:  Ostial left main: There was a 75 % stenosis  The lesion was complex, eccentric, and mildly calcified  This is a likely culprit for the patient's clinical presentation  An intervention was performed  IVUS 4 9mm2 Area stenosis 70%  Proximal LAD: There was a diffuse 95 % stenosis  Severe diffuse disease on IVUS  1st obtuse marginal: There was a 100 % stenosis  Mid RCA: There was a 100 % stenosis  This lesion is a chronic total occlusion  Graft to the 1st diagonal: There was a 100 % stenosis at the proximal anastomosis  It does not appear amenable to intervention    Graft to the 1st obtuse marginal: There was a 100 % stenosis at the proximal anastomosis  It does not appear amenable to intervention  Graft to the distal RCA: There was a 50 % stenosis in the distal third of the graft  It appears amenable to percutaneous intervention      1ST LESION INTERVENTIONS:  A successful balloon angioplasty with stent procedure was performed on the 95 % lesion in the proximal LAD  Following intervention there was an excellent angiographic appearance with a 0 % residual stenosis  A Resolute Panther Burn Rx 2 5 x 22mm drug-eluting stent was placed across the lesion and deployed at a maximum inflation pressure of 12 irwin      2ND LESION INTERVENTIONS:  A successful drug-eluting stent procedure was performed on the 75 % lesion in the ostial left main  Following intervention there was an excellent angiographic appearance with a 0 % residual stenosis  A Resolute Alex Rx 3 5 x 08mm drug-eluting stent was placed across the lesion and deployed at a maximum inflation pressure of 12 irwin  TTE 12/3/2020:  LEFT VENTRICLE:  Systolic function was mildly reduced  Ejection fraction was estimated to be 45 %  There was mild diffuse hypokinesis  Wall thickness was mildly to moderately increased  There was mild concentric hypertrophy  Doppler parameters were consistent with abnormal left ventricular relaxation (grade 1 diastolic dysfunction)      RIGHT VENTRICLE: The size was normal  Systolic function was normal  Wall thickness was normal     VENTRICULAR SEPTUM:  There was paradoxical motion  These changes are consistent with LBBB      LEFT ATRIUM:  The atrium was mildly dilated      MITRAL VALVE:  There was mild to moderate regurgitation      TRICUSPID VALVE:  There was mild regurgitation  Estimated peak PA pressure was 55 mmHg  The findings suggest moderate pulmonary hypertension      PULMONIC VALVE:  There was trace regurgitation      Lab Results   Component Value Date    WBC 7 36 01/19/2021    HGB 11 0 (L) 01/19/2021    HCT 35 8 01/19/2021    MCV 98 01/19/2021     01/19/2021     Lab Results   Component Value Date    SODIUM 135 (L) 01/19/2021    K 3 7 01/19/2021     01/19/2021    CO2 24 01/19/2021    BUN 11 01/19/2021    CREATININE 0 76 01/19/2021    GLUC 83 01/19/2021    CALCIUM 8 6 01/19/2021     Lab Results   Component Value Date    INR 0 91 01/11/2021    INR 0 91 01/11/2021    INR 0 87 01/07/2021    PROTIME 12 3 01/11/2021    PROTIME 12 4 01/11/2021    PROTIME 11 9 01/07/2021     Lab Results   Component Value Date    HGBA1C 5 5 02/05/2019          Physical Exam    Airway    Mallampati score: II  TM Distance: >3 FB  Neck ROM: full     Dental   No notable dental hx     Cardiovascular  Cardiovascular exam normal    Pulmonary  Pulmonary exam normal     Other Findings        Anesthesia Plan  ASA Score- 4     Anesthesia Type- IV sedation with anesthesia with ASA Monitors  Additional Monitors:   Airway Plan:           Plan Factors-Exercise tolerance (METS): <4 METS  Chart reviewed  EKG reviewed  Existing labs reviewed  Patient summary reviewed  Induction- intravenous  Postoperative Plan-     Informed Consent- Anesthetic plan and risks discussed with patient  I personally reviewed this patient with the CRNA  Discussed and agreed on the Anesthesia Plan with the CRNA  Darien Garcia

## 2021-01-19 NOTE — ASSESSMENT & PLAN NOTE
Patient was admitted to [facility] for chest pain; noted to have elevated troponins and underwent left heart catheterization with two LAMINE placed to left main and LAD on 1/11  Patient also had aspiration PNA and completed antibiotics course  Was then noted to have atrial fibrillation with sinus pause; EP examined patient and determine that she would benefit from PPM, transferred to B  · Continue on telemetry  · This AM with afib with RVR, EP aware   Recommend monitoring and avoiding AV dev blockers as she had a 6 second pause at [facility] when given at attempting conversion, she is for PPM today  · Continue NPO

## 2021-01-19 NOTE — PROGRESS NOTES
Progress Note - Mei Tran 1940, 79 y o  female MRN: [id number]    Unit/Bed#: [facility] 730-01 Encounter: [id number]    Primary Care Provider: Yudy Almonte MD   Date and time admitted to hospital: 1/17/2021  7:59 PM      * Encounter for insertion of cardiac resynchronization therapy pacemaker (CRT-P)  Assessment & Plan  Patient was admitted to [facility] for chest pain; noted to have elevated troponins and underwent left heart catheterization with two LAMINE placed to left main and LAD on 1/11  Patient also had aspiration PNA and completed antibiotics course  Was then noted to have atrial fibrillation with sinus pause; EP examined patient and determine that she would benefit from PPM, transferred to [facility]  · Continue on telemetry  · This AM with afib with RVR, EP aware  Recommend monitoring and avoiding AV dev blockers as she had a 6 second pause at [employer] when given at attempting conversion, she is for PPM today  · Continue NPO    Acute combined systolic and diastolic congestive heart failure (HCC)  Assessment & Plan  Wt Readings from Last 3 Encounters:   01/19/21 64 7 kg (142 lb 10 2 oz)   01/16/21 65 3 kg (143 lb 15 4 oz)   01/05/21 63 1 kg (139 lb 3 2 oz)     Patient denies shortness of breath, dyspnea on exertion, swelling, or orthopnea; echo with EF of 45% and diffuse hypokinesis  · Daily weights, Intake/Output  · At [facility] was on IV lasix, then transitioned to PO lasix 40 mg daily as of 1/17          Coronary artery disease involving native coronary artery of native heart with angina pectoris [facility])  Assessment & Plan  on 01/11/2021; patient underwent left heart catheterization with placement of 2 drug-eluting stents  · Currently on DAPT therapy, statin and BB  · Appreciate cardiology input    Atrial fibrillation with rapid ventricular response [facility])  Assessment & Plan  New onset as of 1/17 while at [employer], then had a 6 second pause when converting to NSR  · Cardiology saw patient   She was given amiodarone and placed on lovenox SC  · For PPM  · Will likely need triple therapy given new onset AF (and recent LAMINE placement)  · Continue telemetry     Hyperlipidemia  Assessment & Plan  · Continue statin    Colitis  Assessment & Plan  Recent CT obtained for abdominal pain shows possible colitis with likely infectious etiology  · Reportedly did not have any diarrhea per [facility]  · Was recently on abx for aspiration PNA  · Had diarrhea on , c diff checked and negative  · Supportive care  · PRN imodium     GERD (gastroesophageal reflux disease)  Assessment & Plan  · Continue PPI    Essential hypertension  Assessment & Plan  BP currently acceptable  · Continue cozaar 25 mg daily, lasix 40 mg daily  · BB is also ordered?, will defer to cardiology/EP in regards to holding      VTE Pharmacologic Prophylaxis:   Pharmacologic: Enoxaparin (Lovenox)  Mechanical VTE Prophylaxis in Place: Yes    Patient Centered Rounds: I have performed bedside rounds with nursing staff today  Discussions with Specialists or Other Care Team Provider: EP Directly this AM    Education and Discussions with Family / Patient: patient  Called  with update  Time Spent for Care: 30 minutes  More than 50% of total time spent on counseling and coordination of care as described above  Current Length of Stay: 2 day(s)    Current Patient Status: Inpatient   Certification Statement: The patient will continue to require additional inpatient hospital stay due to needs EP procedure today     Discharge Plan: not yet stable  Code Status: Level 1 - Full Code      Subjective:   Doing okay  Seen urgently this AM when HRs in 160s-180s  She admits to occasional palpitations but nothing major right now  Denies CP, SOB, dizziness, lightheadedness  No further diarrhea       Objective:     Vitals:   Temp (24hrs), Av 9 °F (36 6 °C), Min:97 5 °F (36 4 °C), Max:98 2 °F (36 8 °C)    Temp:  [97 5 °F (36 4 °C)-98 2 °F (36 8 °C)] 97 8 °F (36 6 °C)  HR:  [59-80] 67  Resp:  [16-18] 17  BP: ()/(45-70) 95/55  SpO2:  [90 %-97 %] 94 %  Body mass index is 29 81 kg/m²  Input and Output Summary (last 24 hours): Intake/Output Summary (Last 24 hours) at 1/19/2021 0915  Last data filed at 1/18/2021 1900  Gross per 24 hour   Intake 250 ml   Output 550 ml   Net -300 ml       Physical Exam:     Physical Exam  Vitals signs and nursing note reviewed  Constitutional:       General: She is not in acute distress  Appearance: She is not ill-appearing or diaphoretic  Comments: On RA, appears calm    Cardiovascular:      Rate and Rhythm: Tachycardia present  Rhythm irregular  Pulmonary:      Effort: No respiratory distress  Abdominal:      General: There is no distension  Tenderness: There is no abdominal tenderness  Musculoskeletal:      Right lower leg: No edema  Left lower leg: No edema  Neurological:      Mental Status: She is oriented to person, place, and time  Psychiatric:         Mood and Affect: Mood normal          Additional Data:     Labs:    Results from last 7 days   Lab Units 01/19/21  0546  01/17/21  0520 01/14/21  1411   WBC Thousand/uL 7 36   < > 8 72 10 95*   HEMOGLOBIN g/dL 11 0*   < > 11 3* 11 4*   HEMATOCRIT % 35 8   < > 34 8 34 9   PLATELETS Thousands/uL 217   < > 230 218   NEUTROS PCT %  --   --   --  82*   LYMPHS PCT %  --   --   --  8*   LYMPHO PCT %  --   --  19  --    MONOS PCT %  --   --   --  7   MONO PCT %  --   --  11  --    EOS PCT %  --   --  1 1    < > = values in this interval not displayed  Results from last 7 days   Lab Units 01/19/21  0549  01/17/21  0520   POTASSIUM mmol/L 3 7   < > 3 7   CHLORIDE mmol/L 105   < > 102   CO2 mmol/L 24   < > 24   BUN mg/dL 11   < > 14   CREATININE mg/dL 0 76   < > 0 99   CALCIUM mg/dL 8 6   < > 8 6   ALK PHOS U/L  --   --  55   ALT U/L  --   --  48   AST U/L  --   --  68*    < > = values in this interval not displayed             * I Have Reviewed All Lab Data Listed Above  * Additional Pertinent Lab Tests Reviewed: All Labs Within Last 24 Hours Reviewed    Imaging:    Imaging Reports Reviewed Today Include: all  Imaging Personally Reviewed by Myself Includes:  none    Recent Cultures (last 7 days):     Results from last 7 days   Lab Units 01/18/21  1640 01/14/21  0025 01/13/21  1518 01/13/21  1517   BLOOD CULTURE   --   --  No Growth After 5 Days  No Growth After 5 Days     URINE CULTURE   --  No Growth <1000 cfu/mL  --   --    C DIFF TOXIN B BY PCR  Negative  --   --   --        Last 24 Hours Medication List:   Current Facility-Administered Medications   Medication Dose Route Frequency Provider Last Rate    acetaminophen  650 mg Oral Q4H PRN Yajaira Solorzano MD      aluminum-magnesium hydroxide-simethicone  30 mL Oral Q4H PRN Yajaira Solorzano MD      amiodarone  200 mg Oral Daily With Breakfast Yajaira Solorzano MD      aspirin  81 mg Oral Daily Yajaira Solorzano MD      atorvastatin  20 mg Oral Daily With Gema Rich MD      cefazolin  1,000 mg Intravenous Once Kai Mendez PA-C      Diclofenac Sodium  2 g Topical 4x Daily Yajaira Solorzano MD      enoxaparin  1 mg/kg Subcutaneous Q12H [address] Yajaira Solorzano MD      furosemide  40 mg Oral Daily Yajaira Solorzano MD      loperamide  2 mg Oral 4x Daily PRN Felix Clark PA-C      losartan  25 mg Oral Daily Yajaiar Solorzano MD      metoprolol succinate  50 mg Oral Daily Yajaira Solorzano MD      nitroglycerin  0 4 mg Sublingual Q5 Min PRN Yajaira Solorzano MD      ondansetron  4 mg Intravenous Q6H PRN Yajaira Solorzano MD      oxyCODONE  2 5 mg Oral Q4H PRN Yajaira Solorzano MD      oxyCODONE  5 mg Oral Q4H PRN Yajaira Solorzano MD      pantoprazole  40 mg Oral Early Morning Yajaira Solorzano MD      prasugrel  10 mg Oral Daily Yajaira Solorzano MD      ranolazine  1,000 mg Oral BID Yajaira Solorzano MD      saccharomyces boulardii  250 mg Oral BID Yajaira Solorzano MD      simethicone  80 mg Oral 4x Daily PRN Yajaira Solorzano MD          Today, Patient Was Seen By: Levon Miller PA-C    ** Please Note: Dictation voice to text software may have been used in the creation of this document   **

## 2021-01-19 NOTE — ASSESSMENT & PLAN NOTE
New onset as of 1/17 while at [facility], then had a 6 second pause when converting to NSR  · Cardiology saw patient   She was given amiodarone and placed on lovenox SC  · For PPM  · Will likely need triple therapy given new onset AF (and recent LAMINE placement)  · Continue telemetry

## 2021-01-19 NOTE — ASSESSMENT & PLAN NOTE
Wt Readings from Last 3 Encounters:   01/19/21 64 7 kg (142 lb 10 2 oz)   01/16/21 65 3 kg (143 lb 15 4 oz)   01/05/21 63 1 kg (139 lb 3 2 oz)     Patient denies shortness of breath, dyspnea on exertion, swelling, or orthopnea; echo with EF of 45% and diffuse hypokinesis  · Daily weights, Intake/Output  · At [facility] was on IV lasix, then transitioned to PO lasix 40 mg daily as of 1/17

## 2021-01-19 NOTE — PROGRESS NOTES
Progress Note - Electrophysiology - Cardiology  Jayson Maxwell 79 y o  female MRN: [id number]  Unit/Bed#: [facility] 730-01 Encounter: [id number]      Assessment:  1  Symptomatic newly diagnosed atrial fibrillation  - was on Lovenox / [id number] score of 6 (age, sex, CAD, CHF, HTN)  - rate control: on hold currently due to pauses / had been on metoprolol succinate 50mg daily as an outpatient  - antiarrhythmic therapy: none  - prior cardioversion/ablation: none  2  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- seen to have a 4 second conversion pause  3  Left bundle-branch block at baseline  4  Moderate pulmonary hypertension  5  Essential hypertension  - maintained on amlodipine 10mg daily and metoprolol   6  HFmEF  - diuretic regimen of furosemide 40mg daily  - EF had been 55% per echo 5/2019 / now down to 45% per echo 12/3/2020   7  History of CAD  - prior CABG (LIMA to LAD, SVG to D1, SVG to OM1, SVG to RCA) 2011 / revascularization of LAD in diagonal branch with PCI in late 2011  - now status post LAMINE to proximal LAD and ostial left main on 1/11/2021  - maintained on aspirin, statin, beta blocker, and Effient / also on Ranexa and Imdur  8  Hyperlipidemia  9  Bilateral renal artery stenosis  10  Diarrhea, resolved    Plan:  1  Patient to go for pacemaker implantation today  She did convert back into atrial fibrillation at around 0800 this morning  Her rates ranged from the 130s to 160s  However, given the fact when treated before she had a 4 second conversion pause, will allow her to run tachycardic for now  She is resting comfortably with no complaints with stable blood pressures  Will continue to monitor bring her down for permanent pacemaker by mid day  Will continue to hold metoprolol  Will discuss with attending if Effient needs to be held  We did also discuss the need for anticoagulation moving forward  Patient has been getting Lovenox  Eliquis and Xarelto were both price check, costing about 100 dollars a month    Patient believes that she has a good prescription plan and when she reaches her deductible, this to be affordable  Will therefore initiate Eliquis later on tonight if she is cardioverted during the procedure  Will then also initiate beta-blocker therapy as well  Will call her  to make him aware this current plan  Subjective/Objective   Subjective:  Patient is resting comfortably in her chair today  She does appear more come and did talk to her brother who does have a pacemaker  She denies any chest pain, lightheadedness, or dizziness  She does feel her heart racing a bit  She reports no further diarrhea  C diff was negative      TELE:  Sinus rhythm until 0 800 this morning at which point she is in atrial fibrillation with RVR with rates ranging from the 120s to 160s      Objective:  Vitals: BP 95/55   Pulse 67   Temp 97 8 °F (36 6 °C)   Resp 17   Ht 4' 10" (1 473 m)   Wt 64 7 kg (142 lb 10 2 oz)   SpO2 94%   BMI 29 81 kg/m²     Vitals:    01/18/21 0600 01/19/21 0600   Weight: 71 kg (156 lb 8 oz) 64 7 kg (142 lb 10 2 oz)     Orthostatic Blood Pressures      Most Recent Value   Blood Pressure  95/55 filed at 01/19/2021 0845   Patient Position - Orthostatic VS  Lying filed at 01/18/2021 6739            Intake/Output Summary (Last 24 hours) at 1/19/2021 1014  Last data filed at 1/18/2021 1900  Gross per 24 hour   Intake 250 ml   Output 550 ml   Net -300 ml       Invasive Devices     Peripheral Intravenous Line            Peripheral IV 01/14/21 Right Forearm 4 days                          Scheduled Meds:  Current Facility-Administered Medications   Medication Dose Route Frequency Provider Last Rate    acetaminophen  650 mg Oral Q4H PRN Rosita Eaton MD      aluminum-magnesium hydroxide-simethicone  30 mL Oral Q4H PRN Rosita Eaton MD      amiodarone  200 mg Oral Daily With Breakfast Rosita Eaton MD      aspirin  81 mg Oral Daily Rosita Eaton MD      atorvastatin  20 mg Oral Daily With [employer] Joseph Yan, MD      cefazolin  1,000 mg Intravenous Once Kai Mendez PA-C      Diclofenac Sodium  2 g Topical 4x Daily Joseph Yan, MD      enoxaparin  1 mg/kg Subcutaneous Q12H [facility] Joseph Yan, MD      furosemide  40 mg Oral Daily Jsoeph Yan, MD      loperamide  2 mg Oral 4x Daily PRN Lara Perez PA-C      losartan  25 mg Oral Daily Joseph Yan, MD      metoprolol succinate  50 mg Oral Daily Joseph Champlain, MD      nitroglycerin  0 4 mg Sublingual Q5 Min PRN Joseph Farrah, MD      ondansetron  4 mg Intravenous Q6H PRN Joseph Farrah, MD      oxyCODONE  2 5 mg Oral Q4H PRN Joseph Farrah, MD      oxyCODONE  5 mg Oral Q4H PRN Joseph Champlain, MD      pantoprazole  40 mg Oral Early Morning Joseph Champlain, MD      prasugrel  10 mg Oral Daily Joseph Champlain, MD      ranolazine  1,000 mg Oral BID Joseph Champlain, MD      saccharomyces boulardii  250 mg Oral BID Joseph Champlain, MD      simethicone  80 mg Oral 4x Daily PRN Joseph Farrah, MD       Continuous Infusions:   PRN Meds:   acetaminophen    aluminum-magnesium hydroxide-simethicone    loperamide    nitroglycerin    ondansetron    oxyCODONE    oxyCODONE    simethicone    Review of Systems:  ROS as noted above, otherwise 12 point review of systems was performed and is negative  Physical Exam:   GEN: NAD, alert and oriented, well appearing  SKIN: dry without significant lesions or rashes  HEENT: NCAT, PERRL, EOMs intact  NECK: No JVD or carotid bruits appreciated  CARDIOVASCULAR:  Irregularly irregular, tachycardic, normal S1, S2 without murmurs, rubs, or gallops appreciated  LUNGS: Clear to auscultation bilaterally without wheezes, rhonchi, or rales; decreased breath sounds of lower lobes  ABDOMEN: Soft, nontender, nondistended  EXTREMITIES/VASCULAR: perfused without clubbing, cyanosis, or edema b/l  PSYCH: Normal mood and affect  NEURO: CN ll-Xll grossly intact              Lab Results: I have personally reviewed pertinent lab results      Results from last 7 days   Lab Units 21  0546 21  0823 21  0520   WBC Thousand/uL 7 36 10 07 8 72   HEMOGLOBIN g/dL 11 0* 10 8* 11 3*   HEMATOCRIT % 35 8 33 4* 34 8   PLATELETS Thousands/uL 217 224 230     Results from last 7 days   Lab Units 21  0549 21  0822 21  0520   POTASSIUM mmol/L 3 7 4 1 3 7   CHLORIDE mmol/L 105 106 102   CO2 mmol/L 24 25 24   BUN mg/dL 11 12 14   CREATININE mg/dL 0 76 0 71 0 99   CALCIUM mg/dL 8 6 8 5 8 6         Results from last 7 days   Lab Units 21  0822 21  0520   MAGNESIUM mg/dL 1 9 2 0       Imaging: I have personally reviewed pertinent reports  Results for orders placed during the hospital encounter of 20   Echo complete with contrast if indicated    Narrative [facility] [address]  [address]  [phone]    Transthoracic Echocardiogram  2D, M-mode, Doppler, and Color Doppler    Study date:  03-Dec-2020    Patient: Virgil Malone  MR number: [id number]  Account number: [de-identified]  : 1940  Age: 79 years  Gender: Female  Status: Inpatient  Location: Bedside  Height: 60 in  Weight: 136 lb  BP: 99/ 53 mmHg    Indications: Heart Failure    Diagnoses: I50 9 - Heart failure, unspecified    Sonographer:  Rodriguez Poe RDCS  Primary Physician:  Yoel Muhammad MD  Referring Physician:  Koffi Weinberg DO  Group:  [facility]  Interpreting Physician:  Flores Duque MD    SUMMARY    LEFT VENTRICLE:  Systolic function was mildly reduced  Ejection fraction was estimated to be 45 %  There was mild diffuse hypokinesis  Wall thickness was mildly to moderately increased  There was mild concentric hypertrophy  Doppler parameters were consistent with abnormal left ventricular relaxation (grade 1 diastolic dysfunction)  VENTRICULAR SEPTUM:  There was paradoxical motion  These changes are consistent with LBBB  LEFT ATRIUM:  The atrium was mildly dilated      MITRAL VALVE:  There was mild to moderate regurgitation  TRICUSPID VALVE:  There was mild regurgitation  Estimated peak PA pressure was 55 mmHg  The findings suggest moderate pulmonary hypertension  PULMONIC VALVE:  There was trace regurgitation  HISTORY: PRIOR HISTORY: CAD, Hypertension, GERD, Hyperlipidemia, CP, Stent, CABG, Raynaud's    PROCEDURE: The procedure was performed at the bedside  This was a routine study  The transthoracic approach was used  The study included complete 2D imaging, M-mode, complete spectral Doppler, and color Doppler  The heart rate was 82 bpm,  at the start of the study  Images were obtained from the parasternal, apical, subcostal, and suprasternal notch acoustic windows  Intravenous contrast ( 0 6ml Definity in NSS) was administered to opacify the left ventricle  Echocardiographic views were limited due to decreased penetration and lung interference  This was a technically difficult study  LEFT VENTRICLE: Size was normal  Systolic function was mildly reduced  Ejection fraction was estimated to be 45 %  There was mild diffuse hypokinesis  Wall thickness was mildly to moderately increased  There was mild concentric  hypertrophy  DOPPLER: Doppler parameters were consistent with abnormal left ventricular relaxation (grade 1 diastolic dysfunction)  VENTRICULAR SEPTUM: There was paradoxical motion  These changes are consistent with LBBB  RIGHT VENTRICLE: The size was normal  Systolic function was normal  Wall thickness was normal     LEFT ATRIUM: The atrium was mildly dilated  RIGHT ATRIUM: Size was normal     MITRAL VALVE: Valve structure was normal  There was normal leaflet separation  DOPPLER: The transmitral velocity was within the normal range  There was no evidence for stenosis  There was mild to moderate regurgitation  AORTIC VALVE: The valve was trileaflet  Leaflets exhibited normal thickness and normal cuspal separation   DOPPLER: Transaortic velocity was within the normal range  There was no evidence for stenosis  There was no regurgitation  TRICUSPID VALVE: The valve structure was normal  There was normal leaflet separation  DOPPLER: The transtricuspid velocity was within the normal range  There was no evidence for stenosis  There was mild regurgitation  Estimated peak PA  pressure was 55 mmHg  The findings suggest moderate pulmonary hypertension  PULMONIC VALVE: Leaflets exhibited normal thickness, no calcification, and normal cuspal separation  DOPPLER: The transpulmonic velocity was within the normal range  There was trace regurgitation  PERICARDIUM: There was no pericardial effusion  The pericardium was normal in appearance  AORTA: The root exhibited normal size      SYSTEM MEASUREMENT TABLES    2D  %FS: 17 94 %  Ao Diam: 2 72 cm  Ao asc: 2 46 cm  EDV(Teich): 55 79 ml  EF(Teich): 38 05 %  ESV(Teich): 34 57 ml  IVSd: 1 08 cm  LA Area: 16 14 cm2  LA Diam: 3 04 cm  LVEDV MOD A4C: 52 91 ml  LVEF MOD A4C: 38 16 %  LVESV MOD A4C: 32 72 ml  LVIDd: 3 64 cm  LVIDs: 2 98 cm  LVLd A4C: 6 09 cm  LVLs A4C: 5 64 cm  LVPWd: 0 93 cm  RA Area: 7 24 cm2  RVIDd: 2 34 cm  SV MOD A4C: 20 19 ml  SV(Teich): 21 23 ml    CW  TR Vmax: 3 73 m/s  TR maxP 52 mmHg    MM  TAPSE: 2 12 cm    PW  E' Sept: 0 04 m/s  E/E' Sept: 21 24  MV A Nicola: 1 42 m/s  MV Dec Solano: 4 42 m/s2  MV DecT: 191 8 ms  MV E Nicola: 0 85 m/s  MV E/A Ratio: 0 6  MV PHT: 55 62 ms  MVA By PHT: 3 96 cm2    [employer] Accredited Echocardiography Laboratory    Prepared and electronically signed by    Carissa Quevedo MD  Signed 03-Dec-2020 13:58:50         VTE Pharmacologic Prophylaxis: Reason for no pharmacologic prophylaxis device today  VTE Mechanical Prophylaxis: sequential compression device

## 2021-01-19 NOTE — ANESTHESIA POSTPROCEDURE EVALUATION
Post-Op Assessment Note    CV Status:  Stable    Pain management: adequate     Mental Status:  Awake   Hydration Status:  Stable   PONV Controlled:  Controlled   Airway Patency:  Patent      Post Op Vitals Reviewed: Yes      Staff: Anesthesiologist, CRNA         No complications documented      BP   111/63   Temp      Pulse  80   Resp   18   SpO2   98

## 2021-01-19 NOTE — ASSESSMENT & PLAN NOTE
BP currently acceptable  · Continue cozaar 25 mg daily, lasix 40 mg daily  · BB is also ordered?, will defer to cardiology/EP in regards to holding

## 2021-01-19 NOTE — OCCUPATIONAL THERAPY NOTE
Occupational Therapy Cancel Note:     01/19/21 8541   Note Type   Cancel Reasons Medical status       Occupational therapy attempted however pt nurse reporting pt tachy/request to cancel session at this time  OT will attempt again next treatment       Lonza Jobs

## 2021-01-20 ENCOUNTER — APPOINTMENT (INPATIENT)
Dept: RADIOLOGY | Facility: HOSPITAL | Age: 81
DRG: 242 | End: 2021-01-20
Payer: MEDICARE

## 2021-01-20 LAB
ATRIAL RATE: 159 BPM
ATRIAL RATE: 170 BPM
ATRIAL RATE: 85 BPM
FLUAV RNA RESP QL NAA+PROBE: NEGATIVE
FLUBV RNA RESP QL NAA+PROBE: NEGATIVE
P AXIS: 61 DEGREES
PR INTERVAL: 104 MS
QRS AXIS: 140 DEGREES
QRS AXIS: 62 DEGREES
QRS AXIS: 66 DEGREES
QRSD INTERVAL: 120 MS
QRSD INTERVAL: 144 MS
QRSD INTERVAL: 148 MS
QT INTERVAL: 276 MS
QT INTERVAL: 340 MS
QT INTERVAL: 414 MS
QTC INTERVAL: 454 MS
QTC INTERVAL: 492 MS
QTC INTERVAL: 544 MS
RSV RNA RESP QL NAA+PROBE: NEGATIVE
SARS-COV-2 RNA RESP QL NAA+PROBE: NEGATIVE
T WAVE AXIS: 136 DEGREES
T WAVE AXIS: 216 DEGREES
T WAVE AXIS: 246 DEGREES
VENTRICULAR RATE: 154 BPM
VENTRICULAR RATE: 163 BPM
VENTRICULAR RATE: 85 BPM

## 2021-01-20 PROCEDURE — 0241U HB NFCT DS VIR RESP RNA 4 TRGT: CPT | Performed by: INTERNAL MEDICINE

## 2021-01-20 PROCEDURE — 71046 X-RAY EXAM CHEST 2 VIEWS: CPT

## 2021-01-20 PROCEDURE — 93010 ELECTROCARDIOGRAM REPORT: CPT | Performed by: INTERNAL MEDICINE

## 2021-01-20 PROCEDURE — 99232 SBSQ HOSP IP/OBS MODERATE 35: CPT | Performed by: INTERNAL MEDICINE

## 2021-01-20 PROCEDURE — 99024 POSTOP FOLLOW-UP VISIT: CPT | Performed by: PHYSICIAN ASSISTANT

## 2021-01-20 RX ADMIN — LOPERAMIDE HYDROCHLORIDE 2 MG: 2 CAPSULE ORAL at 09:39

## 2021-01-20 RX ADMIN — Medication 250 MG: at 17:24

## 2021-01-20 RX ADMIN — ATORVASTATIN CALCIUM 20 MG: 20 TABLET, FILM COATED ORAL at 17:24

## 2021-01-20 RX ADMIN — PRASUGREL HYDROCHLORIDE 10 MG: 10 TABLET, FILM COATED ORAL at 11:36

## 2021-01-20 RX ADMIN — RANOLAZINE 1000 MG: 500 TABLET, FILM COATED, EXTENDED RELEASE ORAL at 17:27

## 2021-01-20 RX ADMIN — RANOLAZINE 1000 MG: 500 TABLET, FILM COATED, EXTENDED RELEASE ORAL at 11:13

## 2021-01-20 RX ADMIN — PANTOPRAZOLE SODIUM 40 MG: 40 TABLET, DELAYED RELEASE ORAL at 05:10

## 2021-01-20 RX ADMIN — AMIODARONE HYDROCHLORIDE 200 MG: 200 TABLET ORAL at 09:39

## 2021-01-20 RX ADMIN — Medication 250 MG: at 09:39

## 2021-01-20 RX ADMIN — RIVAROXABAN 15 MG: 15 TABLET, FILM COATED ORAL at 17:24

## 2021-01-20 RX ADMIN — OXYCODONE HYDROCHLORIDE 2.5 MG: 5 TABLET ORAL at 02:27

## 2021-01-20 RX ADMIN — ONDANSETRON 4 MG: 2 INJECTION INTRAMUSCULAR; INTRAVENOUS at 07:12

## 2021-01-20 NOTE — ASSESSMENT & PLAN NOTE
on 01/11/2021; patient underwent left heart catheterization with placement of 2 drug-eluting stents  · Was on DAPT with asa/effient, statin and BB  · EP stopped aspirin and is on Effient and Xarelto now with new afib issues  · Appreciate cardiology input

## 2021-01-20 NOTE — ASSESSMENT & PLAN NOTE
Recent CT obtained for abdominal pain while at [facility] showed possible colitis with likely infectious etiology  · Was recently on abx for aspiration PNA  · Had diarrhea on 1/18, c diff checked and negative  · Supportive care  · PRN imodium

## 2021-01-20 NOTE — ASSESSMENT & PLAN NOTE
Patient was admitted to [facility] for chest pain; noted to have elevated troponins and underwent left heart catheterization with two LAMINE placed to left main and LAD on 1/11  Patient also had aspiration PNA and completed antibiotics course   Was then noted to have atrial fibrillation with sinus pause; EP examined patient and determine that she would benefit from PPM, transferred to \[facility]\""  · Continue on telemetry  · S/P PPM placement on 1/19

## 2021-01-20 NOTE — PROGRESS NOTES
Progress Note - Electrophysiology  Celia Lares 79 y o  female MRN: [id number]  Unit/Bed#: [facility] 730-01 Encounter: [id number]      Assessment:  1  Symptomatic newly diagnosed atrial fibrillation  - anticoagulated with Xarelto 15mg daily (see below #7) / Cffif1Smok score of 6 (age, sex, CAD, CHF, HTN)  - rate control: metoprolol succinate 50mg daily as an outpatient  - antiarrhythmic therapy: none  - prior cardioversion/ablation: none  2  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- seen to have a 4 second conversion pause  - status post Medtronic dual-chamber pacemaker with left posterior fascicle lead by Dr Kelsie Roy on 1/19/2021  3  Left bundle-branch block at baseline  4  Moderate pulmonary hypertension  5  Essential hypertension  - maintained on amlodipine 10mg daily and metoprolol   6  HFmEF  - diuretic regimen of furosemide 40mg daily  - EF had been 55% per echo 5/2019 / now down to 45% per echo 12/3/2020   7  History of CAD  - prior CABG (LIMA to LAD, SVG to D1, SVG to OM1, SVG to RCA) 2011 / revascularization of LAD in diagonal branch with PCI in late 2011  - now status post LAMINE to proximal LAD and ostial left main on 1/11/2021  - maintained on statin, beta blocker, and Effient / also on Ranexa and Imdur  - patient being maintained on Effient and Xarelto 15 mg daily with cessation of aspirin per attending recommendations  8  Hyperlipidemia  9  Bilateral renal artery stenosis  10  Diarrhea, resolved    Plan:  1  Device - Patient is doing well status post Medtronic pacemaker  Her incision is clean, dry, and intact without evidence of hematoma or ecchymosis or signs of infection  Vital signs and labs were reviewed  Assessment of chest x-rays show proper lead placement without evidence of pneumothorax  Device interrogation showed appropriate device function with lead parameters such as sensing, threshold, and impedance being tested  Of note, her AV delay was shortened to force pace her       2  Post care - Discharge instructions and restrictions were discussed with the patient in detail  She will also be provided with written instructions detailing what was discussed  Patient also requires a 2 week device and site check which has already been arranged and is in Epic  3  Medications - In terms of medications, she will be maintained on metoprolol succinate 50 mg daily as she will tolerate this  She will not be started on antiarrhythmic therapy, will first attempt rate control  If she continues to have episodes of RVR in the outpatient setting, could consider re-initiation of amiodarone  Patient would have been maintained on triple therapy, however attending recommendations will stop aspirin, continue Effient, and placed on Xarelto 15 mg daily  Patient and spouse have been updated on the cost of Xarelto being a little less than 100 dollars a month  They are in agreement with paying this  4  Patient is stable from an EP standpoint for discharge at this time  Will have the office extend out patient's follow-up with General Cardiology as her point was supposed to be 1/25 and will most likely be a rehab still at this time  Subjective/Objective   Subjective: Gucci Richardson is a 79y o  year old female with symptomatic new onset atrial fibrillation tachy-jillian syndrome, left bundle-branch baseline, moderate pulmonary hypertension, essential hypertension heart failure with mid-range EF recently down, history of CAD with prior CABG and stents, hyperlipidemia, and bilateral renal artery stenosis  She is hospital stay day 4 and is status post pacemaker  She reports no issues overnight, denies chest pain, shortness of breath, palpitations, lightheadedness or dizziness  Her main concern is the fact that she continues to have diarrhea and also was nauseous, having just thrown up prior to me entering the room  Telemetry shows a sensed ventricular paced rhythm      Objective:  Vitals: /59   Pulse 89   Temp 97 8 °F (36 6 °C) (Oral)   Resp 16   Ht 4' 10" (1 473 m)   Wt 64 kg (141 lb)   SpO2 90%   BMI 29 47 kg/m²     Vitals:    01/19/21 0600 01/20/21 0600   Weight: 64 7 kg (142 lb 10 2 oz) 64 kg (141 lb)     Orthostatic Blood Pressures      Most Recent Value   Blood Pressure  106/59 filed at 01/20/2021 6413   Patient Position - Orthostatic VS  Lying filed at 01/20/2021 0600            Intake/Output Summary (Last 24 hours) at 1/20/2021 1327  Last data filed at 1/20/2021 0730  Gross per 24 hour   Intake 980 ml   Output 250 ml   Net 730 ml       Invasive Devices     Peripheral Intravenous Line            Peripheral IV 01/19/21 Left;Ventral (anterior) Forearm less than 1 day                          Scheduled Meds:  Current Facility-Administered Medications   Medication Dose Route Frequency Provider Last Rate    acetaminophen  650 mg Oral Q4H PRN Robert Whaley MD      aluminum-magnesium hydroxide-simethicone  30 mL Oral Q4H PRN Robert Whaley MD      amiodarone  200 mg Oral Daily With Breakfast Robert Whaley MD      atorvastatin  20 mg Oral Daily With Zac Martin MD      cefazolin  1,000 mg Intravenous Once Brockton Crown, PA-C      Diclofenac Sodium  2 g Topical 4x Daily Robert Whaley MD      furosemide  40 mg Oral Daily Robert Whaley MD      loperamide  2 mg Oral 4x Daily PRN Clifm Eye, PA-C      losartan  25 mg Oral Daily Robert Whaley MD      metoprolol succinate  50 mg Oral Daily Robert Whaley MD      nitroglycerin  0 4 mg Sublingual Q5 Min PRN Robert Whaley MD      ondansetron  4 mg Intravenous Q6H PRN Robert Whaley MD      oxyCODONE  2 5 mg Oral Q4H PRN Robert Whaley MD      oxyCODONE  5 mg Oral Q4H PRN Robert Whaley MD      pantoprazole  40 mg Oral Early Morning Robert Whaley MD      prasugrel  10 mg Oral Daily Robert Whaley MD      ranolazine  1,000 mg Oral BID Robert Whaley MD      rivaroxaban  15 mg Oral Daily With [facility], PA-C      saccharomyces boulardii  250 mg Oral BID Oc Morgan MD      simethicone  80 mg Oral 4x Daily PRN Oc Morgan MD       Continuous Infusions:   PRN Meds:   acetaminophen    aluminum-magnesium hydroxide-simethicone    loperamide    nitroglycerin    ondansetron    oxyCODONE    oxyCODONE    simethicone    Review of Systems:  ROS as noted above, otherwise 12 point review of systems was performed and is negative  Physical Exam:   GEN: NAD, alert and oriented, well appearing  SKIN: dry without significant lesions or rashes  HEENT: NCAT, PERRL, EOMs intact  NECK: No JVD or carotid bruits appreciated  CARDIOVASCULAR: RRR, normal S1, S2 without murmurs, rubs, or gallops appreciated  LUNGS: Clear to auscultation bilaterally without wheezes, rhonchi, or rales  ABDOMEN: Soft, nontender, nondistended  EXTREMITIES/VASCULAR: perfused without clubbing, cyanosis, or edema b/l  PSYCH: Normal mood and affect  NEURO: CN ll-Xll grossly intact              Lab Results: I have personally reviewed pertinent lab results  Results from last 7 days   Lab Units 01/19/21  0546 01/18/21  0823 01/17/21  0520   WBC Thousand/uL 7 36 10 07 8 72   HEMOGLOBIN g/dL 11 0* 10 8* 11 3*   HEMATOCRIT % 35 8 33 4* 34 8   PLATELETS Thousands/uL 217 224 230     Results from last 7 days   Lab Units 01/19/21  0549 01/18/21  0822 01/17/21  0520   POTASSIUM mmol/L 3 7 4 1 3 7   CHLORIDE mmol/L 105 106 102   CO2 mmol/L 24 25 24   BUN mg/dL 11 12 14   CREATININE mg/dL 0 76 0 71 0 99   CALCIUM mg/dL 8 6 8 5 8 6         Results from last 7 days   Lab Units 01/18/21  0822 01/17/21  0520   MAGNESIUM mg/dL 1 9 2 0       Imaging: I have personally reviewed pertinent reports      Results for orders placed during the hospital encounter of 12/02/20   Echo complete with contrast if indicated    [address]  [facility] [place] [address] [facility]  [phone]    Transthoracic Echocardiogram  2D, M-mode, Doppler, and Color Doppler    Study date: 03-Dec-2020    Patient: Vitor Rebolledo  MR number: [id number]  Account number: [de-identified]  : 1940  Age: 79 years  Gender: Female  Status: Inpatient  Location: Bedside  Height: 60 in  Weight: 136 lb  BP: 99/ 53 mmHg    Indications: Heart Failure    Diagnoses: I50 9 - Heart failure, unspecified    Sonographer:  Chandra Mancera RDCS  Primary Physician:  Marc Mason MD  Referring Physician:  Evon Kehr, DO  Group:  [facility]  Interpreting Physician:  Kathryn Reynaga MD    SUMMARY    LEFT VENTRICLE:  Systolic function was mildly reduced  Ejection fraction was estimated to be 45 %  There was mild diffuse hypokinesis  Wall thickness was mildly to moderately increased  There was mild concentric hypertrophy  Doppler parameters were consistent with abnormal left ventricular relaxation (grade 1 diastolic dysfunction)  VENTRICULAR SEPTUM:  There was paradoxical motion  These changes are consistent with LBBB  LEFT ATRIUM:  The atrium was mildly dilated  MITRAL VALVE:  There was mild to moderate regurgitation  TRICUSPID VALVE:  There was mild regurgitation  Estimated peak PA pressure was 55 mmHg  The findings suggest moderate pulmonary hypertension  PULMONIC VALVE:  There was trace regurgitation  HISTORY: PRIOR HISTORY: CAD, Hypertension, GERD, Hyperlipidemia, CP, Stent, CABG, Raynaud's    PROCEDURE: The procedure was performed at the bedside  This was a routine study  The transthoracic approach was used  The study included complete 2D imaging, M-mode, complete spectral Doppler, and color Doppler  The heart rate was 82 bpm,  at the start of the study  Images were obtained from the parasternal, apical, subcostal, and suprasternal notch acoustic windows  Intravenous contrast ( 0 6ml Definity in NSS) was administered to opacify the left ventricle  Echocardiographic views were limited due to decreased penetration and lung interference   This was a technically difficult study     LEFT VENTRICLE: Size was normal  Systolic function was mildly reduced  Ejection fraction was estimated to be 45 %  There was mild diffuse hypokinesis  Wall thickness was mildly to moderately increased  There was mild concentric  hypertrophy  DOPPLER: Doppler parameters were consistent with abnormal left ventricular relaxation (grade 1 diastolic dysfunction)  VENTRICULAR SEPTUM: There was paradoxical motion  These changes are consistent with LBBB  RIGHT VENTRICLE: The size was normal  Systolic function was normal  Wall thickness was normal     LEFT ATRIUM: The atrium was mildly dilated  RIGHT ATRIUM: Size was normal     MITRAL VALVE: Valve structure was normal  There was normal leaflet separation  DOPPLER: The transmitral velocity was within the normal range  There was no evidence for stenosis  There was mild to moderate regurgitation  AORTIC VALVE: The valve was trileaflet  Leaflets exhibited normal thickness and normal cuspal separation  DOPPLER: Transaortic velocity was within the normal range  There was no evidence for stenosis  There was no regurgitation  TRICUSPID VALVE: The valve structure was normal  There was normal leaflet separation  DOPPLER: The transtricuspid velocity was within the normal range  There was no evidence for stenosis  There was mild regurgitation  Estimated peak PA  pressure was 55 mmHg  The findings suggest moderate pulmonary hypertension  PULMONIC VALVE: Leaflets exhibited normal thickness, no calcification, and normal cuspal separation  DOPPLER: The transpulmonic velocity was within the normal range  There was trace regurgitation  PERICARDIUM: There was no pericardial effusion  The pericardium was normal in appearance  AORTA: The root exhibited normal size      SYSTEM MEASUREMENT TABLES    2D  %FS: 17 94 %  Ao Diam: 2 72 cm  Ao asc: 2 46 cm  EDV(Teich): 55 79 ml  EF(Teich): 38 05 %  ESV(Teich): 34 57 ml  IVSd: 1 08 cm  LA Area: 16 14 cm2  LA Diam: 3 04 cm  LVEDV MOD A4C: 52 91 ml  LVEF MOD A4C: 38 16 %  LVESV MOD A4C: 32 72 ml  LVIDd: 3 64 cm  LVIDs: 2 98 cm  LVLd A4C: 6 09 cm  LVLs A4C: 5 64 cm  LVPWd: 0 93 cm  RA Area: 7 24 cm2  RVIDd: 2 34 cm  SV MOD A4C: 20 19 ml  SV(Teich): 21 23 ml    CW  TR Vmax: 3 73 m/s  TR maxP 52 mmHg    MM  TAPSE: 2 12 cm    PW  E' Sept: 0 04 m/s  E/E' Sept: 21 24  MV A Nicola: 1 42 m/s  MV Dec Terrell: 4 42 m/s2  MV DecT: 191 8 ms  MV E Nicola: 0 85 m/s  MV E/A Ratio: 0 6  MV PHT: 55 62 ms  MVA By PHT: 3 96 cm2    [facility] Accredited Echocardiography Laboratory    Prepared and electronically signed by    Magali Powell MD  Signed 03-Dec-2020 13:58:50         VTE Pharmacologic Prophylaxis: Xarelto  VTE Mechanical Prophylaxis: sequential compression device

## 2021-01-20 NOTE — RESTORATIVE TECHNICIAN NOTE
Restorative Specialist Mobility Note       Activity: Ambulate in balderas, [address] privileges, Ambulate in room, Chair, Dangle, Stand at bedside(Educated/encouraged pt to ambulate with assistance 3-4 x's/day  Bed alarm on  Pt callbell, phone/tray within reach )     Assistive Device: Front wheel walker, Other (Comment), Sling(Assist x1 with chair follow  L UE arm sling on   Pt is NWB to L UE )       Ilir ORDONEZ, Restorative Technician, [employer]

## 2021-01-20 NOTE — CASE MANAGEMENT
CM was informed of tentative dc for today/tomorrow  CM spoke to [facility] liaison to inform of same  [facility] states [facility] is being reviewed by physicians;  they are awaiting EP recommendations for PPM precautions  CM updated [place] PABRIGIDO on same

## 2021-01-20 NOTE — PROGRESS NOTES
Progress Note - Abby Hemphill 1940, 79 y o  female MRN: [id number]    Unit/Bed#: [facility] 730-01 Encounter: [id number]    Primary Care Provider: Estefani Lubin MD   Date and time admitted to hospital: 1/17/2021  7:59 PM      RN had to hold BP medications today due to low BP overall  Unable to obtain morning labs  Awaiting EP clearance  * Encounter for insertion of cardiac resynchronization therapy pacemaker (CRT-P)  Assessment & Plan  Patient was admitted to [facility] for chest pain; noted to have elevated troponins and underwent left heart catheterization with two LAMINE placed to left main and LAD on 1/11  Patient also had aspiration PNA and completed antibiotics course   Was then noted to have atrial fibrillation with sinus pause; EP examined patient and determine that she would benefit from PPM, transferred to [facility]  · Continue on telemetry  · S/P PPM placement on 1/19    Coronary artery disease involving native coronary artery of native heart with angina pectoris [facility])  Assessment & Plan  on 01/11/2021; patient underwent left heart catheterization with placement of 2 drug-eluting stents  · Was on DAPT with asa/effient, statin and BB  · EP stopped aspirin and is on Effient and Xarelto now with new afib issues  · Appreciate cardiology input    Acute combined systolic and diastolic congestive heart failure ([address] )  Assessment & Plan  Wt Readings from Last 3 Encounters:   01/20/21 64 kg (141 lb)   01/16/21 65 3 kg (143 lb 15 4 oz)   01/05/21 63 1 kg (139 lb 3 2 oz)     Patient denies shortness of breath, dyspnea on exertion, swelling, or orthopnea; echo with EF of 45% and diffuse hypokinesis  · Daily weights, Intake/Output  · At [facility] was on IV lasix, then transitioned to PO lasix 40 mg daily as of 1/17  · Appears euvolemic           Atrial fibrillation with rapid ventricular response [facility])  Assessment & Plan  New onset as of 1/17 while at [facility], then had a 6 second pause when converting to NSR  · Cardiology saw patient  She was given amiodarone and placed on lovenox SC  · S/P PPM on   · Continue telemetry   · Was placed on Xarelto by EP team     Hyperlipidemia  Assessment & Plan  · Continue statin    Colitis  Assessment & Plan  Recent CT obtained for abdominal pain while at [facility] showed possible colitis with likely infectious etiology  · Was recently on abx for aspiration PNA  · Had diarrhea on , c diff checked and negative  · Supportive care  · PRN imodium     GERD (gastroesophageal reflux disease)  Assessment & Plan  · Continue PPI    Essential hypertension  Assessment & Plan  BP currently acceptable though she did have some hypotension on   · Continue cozaar 25 mg daily, lasix 40 mg daily, and metoprolol though RN had to hold today given low BPs      VTE Pharmacologic Prophylaxis:   Pharmacologic: Rivaroxaban (Xarelto)  Mechanical VTE Prophylaxis in Place: Yes    Patient Centered Rounds: I have performed bedside rounds with nursing staff today  Discussions with Specialists or Other Care Team Provider: BRIANA, Appreciate EP input  Education and Discussions with Family / Patient: patient  Called      Time Spent for Care: 30 minutes  More than 50% of total time spent on counseling and coordination of care as described above  Current Length of Stay: 3 day(s)    Current Patient Status: Inpatient   Certification Statement: The patient will continue to require additional inpatient hospital stay due to EP clearance    Discharge Plan: Pending EP clearance, may be medically stable  ARC is reviewing patient  Code Status: Level 1 - Full Code      Subjective:   Doing okay today  Reports being very tired overall  No dizziness, lightheadedness, CP, shortness of breath  She denies abdominal pain, nausea or vomiting but continues with diarrhea       Objective:     Vitals:   Temp (24hrs), Av 9 °F (36 6 °C), Min:97 5 °F (36 4 °C), Max:98 °F (36 7 °C)    Temp:  [97 5 °F (36 4 °C)-98 °F (36 7 °C)] 97 8 °F (36 6 °C)  HR:  [0-100] 89  Resp:  [16] 16  BP: ()/(42-89) 106/59  SpO2:  [89 %-97 %] 90 %  Body mass index is 29 47 kg/m²  Input and Output Summary (last 24 hours): Intake/Output Summary (Last 24 hours) at 1/20/2021 0949  Last data filed at 1/19/2021 2000  Gross per 24 hour   Intake 800 ml   Output 250 ml   Net 550 ml       Physical Exam:     Physical Exam  Vitals signs and nursing note reviewed  Constitutional:       General: She is not in acute distress  Comments: L chest wall pacer site appears c/d/i, in sling    Cardiovascular:      Rate and Rhythm: Normal rate  Pulmonary:      Effort: No respiratory distress  Abdominal:      General: There is no distension  Tenderness: There is no abdominal tenderness  Skin:     Coloration: Skin is pale  Neurological:      Mental Status: She is oriented to person, place, and time  Psychiatric:         Mood and Affect: Mood normal          Additional Data:     Labs:    Results from last 7 days   Lab Units 01/19/21  0546  01/17/21  0520 01/14/21  1411   WBC Thousand/uL 7 36   < > 8 72 10 95*   HEMOGLOBIN g/dL 11 0*   < > 11 3* 11 4*   HEMATOCRIT % 35 8   < > 34 8 34 9   PLATELETS Thousands/uL 217   < > 230 218   NEUTROS PCT %  --   --   --  82*   LYMPHS PCT %  --   --   --  8*   LYMPHO PCT %  --   --  19  --    MONOS PCT %  --   --   --  7   MONO PCT %  --   --  11  --    EOS PCT %  --   --  1 1    < > = values in this interval not displayed  Results from last 7 days   Lab Units 01/19/21  0549  01/17/21  0520   POTASSIUM mmol/L 3 7   < > 3 7   CHLORIDE mmol/L 105   < > 102   CO2 mmol/L 24   < > 24   BUN mg/dL 11   < > 14   CREATININE mg/dL 0 76   < > 0 99   CALCIUM mg/dL 8 6   < > 8 6   ALK PHOS U/L  --   --  55   ALT U/L  --   --  48   AST U/L  --   --  68*    < > = values in this interval not displayed  * I Have Reviewed All Lab Data Listed Above  * Additional Pertinent Lab Tests Reviewed:  All Labs Within Last 24 Hours Reviewed    Imaging:    Imaging Reports Reviewed Today Include: all  Imaging Personally Reviewed by Myself Includes:  none    Recent Cultures (last 7 days):     Results from last 7 days   Lab Units 01/18/21  1640 01/14/21  0025 01/13/21  1518 01/13/21  1517   BLOOD CULTURE   --   --  No Growth After 5 Days  No Growth After 5 Days     URINE CULTURE   --  No Growth <1000 cfu/mL  --   --    C DIFF TOXIN B BY PCR  Negative  --   --   --        Last 24 Hours Medication List:   Current Facility-Administered Medications   Medication Dose Route Frequency Provider Last Rate    acetaminophen  650 mg Oral Q4H PRN Adeline Ambrosio MD      aluminum-magnesium hydroxide-simethicone  30 mL Oral Q4H PRN Adeline Ambrosio MD      amiodarone  200 mg Oral Daily With Breakfast Adeline Ambrosio MD      atorvastatin  20 mg Oral Daily With Jose Alejandro Tineo MD      cefazolin  1,000 mg Intravenous Once [facility]      Diclofenac Sodium  2 g Topical 4x Daily Adeline Ambrosio MD      furosemide  40 mg Oral Daily Adeline Ambrosio MD      loperamide  2 mg Oral 4x Daily PRN Rocky Guan PA-C      losartan  25 mg Oral Daily Adeline Ambrosio MD      metoprolol succinate  50 mg Oral Daily Adeline Ambrosio MD      nitroglycerin  0 4 mg Sublingual Q5 Min PRN Adeline Ambrosio MD      ondansetron  4 mg Intravenous Q6H PRN Adeline Ambrosio MD      oxyCODONE  2 5 mg Oral Q4H PRN Adeline Ambrosio MD      oxyCODONE  5 mg Oral Q4H PRN Adeline Ambrosio MD      pantoprazole  40 mg Oral Early Morning Adeline Ambrosio MD      prasugrel  10 mg Oral Daily Adeline Ambrosio MD      ranolazine  1,000 mg Oral BID Adeline Ambrosio MD      rivaroxaban  15 mg Oral Daily With [employer]      saccharomyces boulardii  250 mg Oral BID Adeline Ambrosio MD      simethicone  80 mg Oral 4x Daily PRN Adeline Ambrosio MD          Today, Patient Was Seen By: Rocky Guan PA-C    ** Please Note: Dictation voice to text software may have been used in the creation of this document   **

## 2021-01-20 NOTE — RESTORATIVE TECHNICIAN NOTE
Restorative Specialist Mobility Note       Activity: Chair, Dangle, Stand at bedside, Other (Comment)(Educated/encouraged pt to ambulate with assistance 3-4 x's/day, pt states she would like to walk later [employer] present/aware  Chair alarm on   Pt callbell, phone/tray within reach )     Assistive Device: Front wheel walker, Other (Comment)(Assist x1-2 to stand to clean 2* incontinence of BM)       Waqas ORDONEZ, Restorative Technician, [employer]

## 2021-01-20 NOTE — ASSESSMENT & PLAN NOTE
BP currently acceptable though she did have some hypotension on 1/19  · Continue cozaar 25 mg daily, lasix 40 mg daily, and metoprolol though RN had to hold today given low BPs

## 2021-01-20 NOTE — ASSESSMENT & PLAN NOTE
New onset as of 1/17 while at [facility], then had a 6 second pause when converting to NSR  · Cardiology saw patient   She was given amiodarone and placed on lovenox SC  · S/P PPM on 1/20  · Continue telemetry   · Was placed on Xarelto by EP team

## 2021-01-21 ENCOUNTER — HOSPITAL ENCOUNTER (INPATIENT)
Facility: HOSPITAL | Age: 81
LOS: 8 days | DRG: 949 | End: 2021-01-29
Attending: PHYSICAL MEDICINE & REHABILITATION | Admitting: PHYSICAL MEDICINE & REHABILITATION
Payer: MEDICARE

## 2021-01-21 VITALS
SYSTOLIC BLOOD PRESSURE: 124 MMHG | WEIGHT: 140.4 LBS | RESPIRATION RATE: 16 BRPM | HEIGHT: 58 IN | OXYGEN SATURATION: 97 % | DIASTOLIC BLOOD PRESSURE: 41 MMHG | BODY MASS INDEX: 29.47 KG/M2 | TEMPERATURE: 98.7 F | HEART RATE: 77 BPM

## 2021-01-21 DIAGNOSIS — K58.9 IBS (IRRITABLE BOWEL SYNDROME): ICD-10-CM

## 2021-01-21 DIAGNOSIS — R41.82 ALTERED MENTAL STATUS: ICD-10-CM

## 2021-01-21 DIAGNOSIS — I10 ESSENTIAL HYPERTENSION: Primary | Chronic | ICD-10-CM

## 2021-01-21 DIAGNOSIS — R63.0 DECREASED APPETITE: ICD-10-CM

## 2021-01-21 DIAGNOSIS — R19.7 DIARRHEA: ICD-10-CM

## 2021-01-21 PROBLEM — E87.1 HYPONATREMIA: Status: ACTIVE | Noted: 2021-01-21

## 2021-01-21 PROBLEM — I25.10 CAD (CORONARY ARTERY DISEASE): Status: ACTIVE | Noted: 2021-01-21

## 2021-01-21 PROBLEM — R74.01 ELEVATED AST (SGOT): Status: ACTIVE | Noted: 2021-01-21

## 2021-01-21 PROBLEM — N18.9 CKD (CHRONIC KIDNEY DISEASE): Status: ACTIVE | Noted: 2021-01-21

## 2021-01-21 PROBLEM — I50.40 COMBINED CONGESTIVE SYSTOLIC AND DIASTOLIC HEART FAILURE (HCC): Status: ACTIVE | Noted: 2021-01-08

## 2021-01-21 PROBLEM — R13.10 DYSPHAGIA: Status: ACTIVE | Noted: 2021-01-21

## 2021-01-21 PROBLEM — D64.9 ANEMIA: Status: ACTIVE | Noted: 2021-01-21

## 2021-01-21 PROBLEM — I49.5 TACHY-BRADY SYNDROME (HCC): Status: ACTIVE | Noted: 2021-01-21

## 2021-01-21 PROBLEM — I48.91 A-FIB (HCC): Status: ACTIVE | Noted: 2021-01-21

## 2021-01-21 PROBLEM — I73.9 PAD (PERIPHERAL ARTERY DISEASE) (HCC): Status: ACTIVE | Noted: 2021-01-21

## 2021-01-21 PROCEDURE — 99223 1ST HOSP IP/OBS HIGH 75: CPT | Performed by: PHYSICAL MEDICINE & REHABILITATION

## 2021-01-21 PROCEDURE — 97112 NEUROMUSCULAR REEDUCATION: CPT

## 2021-01-21 PROCEDURE — 99239 HOSP IP/OBS DSCHRG MGMT >30: CPT | Performed by: INTERNAL MEDICINE

## 2021-01-21 PROCEDURE — 97535 SELF CARE MNGMENT TRAINING: CPT

## 2021-01-21 PROCEDURE — 97116 GAIT TRAINING THERAPY: CPT

## 2021-01-21 PROCEDURE — NC001 PR NO CHARGE

## 2021-01-21 RX ORDER — OXYCODONE HYDROCHLORIDE 5 MG/1
2.5 TABLET ORAL EVERY 4 HOURS PRN
Status: CANCELLED | OUTPATIENT
Start: 2021-01-21

## 2021-01-21 RX ORDER — ATORVASTATIN CALCIUM 20 MG/1
20 TABLET, FILM COATED ORAL
Status: CANCELLED | OUTPATIENT
Start: 2021-01-21

## 2021-01-21 RX ORDER — PANTOPRAZOLE SODIUM 40 MG/1
40 TABLET, DELAYED RELEASE ORAL
Status: CANCELLED | OUTPATIENT
Start: 2021-01-22

## 2021-01-21 RX ORDER — LOPERAMIDE HYDROCHLORIDE 2 MG/1
2 CAPSULE ORAL 3 TIMES DAILY PRN
Status: DISCONTINUED | OUTPATIENT
Start: 2021-01-21 | End: 2021-01-26

## 2021-01-21 RX ORDER — ATORVASTATIN CALCIUM 20 MG/1
20 TABLET, FILM COATED ORAL
Status: DISCONTINUED | OUTPATIENT
Start: 2021-01-22 | End: 2021-01-21

## 2021-01-21 RX ORDER — SACCHAROMYCES BOULARDII 250 MG
250 CAPSULE ORAL 2 TIMES DAILY
Status: CANCELLED | OUTPATIENT
Start: 2021-01-21

## 2021-01-21 RX ORDER — LOSARTAN POTASSIUM 25 MG/1
25 TABLET ORAL DAILY
Status: CANCELLED | OUTPATIENT
Start: 2021-01-22

## 2021-01-21 RX ORDER — OXYCODONE HYDROCHLORIDE 5 MG/1
5 TABLET ORAL EVERY 4 HOURS PRN
Status: CANCELLED | OUTPATIENT
Start: 2021-01-21

## 2021-01-21 RX ORDER — OXYCODONE HYDROCHLORIDE 5 MG/1
2.5 TABLET ORAL EVERY 4 HOURS PRN
Status: DISCONTINUED | OUTPATIENT
Start: 2021-01-21 | End: 2021-01-29 | Stop reason: HOSPADM

## 2021-01-21 RX ORDER — ONDANSETRON 2 MG/ML
4 INJECTION INTRAMUSCULAR; INTRAVENOUS EVERY 6 HOURS PRN
Status: CANCELLED | OUTPATIENT
Start: 2021-01-21

## 2021-01-21 RX ORDER — LOPERAMIDE HYDROCHLORIDE 2 MG/1
2 CAPSULE ORAL 4 TIMES DAILY PRN
Status: CANCELLED | OUTPATIENT
Start: 2021-01-21

## 2021-01-21 RX ORDER — RANOLAZINE 500 MG/1
1000 TABLET, EXTENDED RELEASE ORAL 2 TIMES DAILY
Status: CANCELLED | OUTPATIENT
Start: 2021-01-21

## 2021-01-21 RX ORDER — ATORVASTATIN CALCIUM 20 MG/1
20 TABLET, FILM COATED ORAL
Status: DISCONTINUED | OUTPATIENT
Start: 2021-01-22 | End: 2021-01-29 | Stop reason: HOSPADM

## 2021-01-21 RX ORDER — PRASUGREL 10 MG/1
10 TABLET, FILM COATED ORAL DAILY
Status: DISCONTINUED | OUTPATIENT
Start: 2021-01-22 | End: 2021-01-25

## 2021-01-21 RX ORDER — FUROSEMIDE 40 MG/1
40 TABLET ORAL DAILY
Status: CANCELLED | OUTPATIENT
Start: 2021-01-22

## 2021-01-21 RX ORDER — METOPROLOL SUCCINATE 50 MG/1
50 TABLET, EXTENDED RELEASE ORAL DAILY
Status: CANCELLED | OUTPATIENT
Start: 2021-01-22

## 2021-01-21 RX ORDER — PRAVASTATIN SODIUM 80 MG/1
80 TABLET ORAL
Status: DISCONTINUED | OUTPATIENT
Start: 2021-01-22 | End: 2021-01-21

## 2021-01-21 RX ORDER — METOPROLOL SUCCINATE 50 MG/1
50 TABLET, EXTENDED RELEASE ORAL DAILY
Status: DISCONTINUED | OUTPATIENT
Start: 2021-01-22 | End: 2021-01-29 | Stop reason: HOSPADM

## 2021-01-21 RX ORDER — MAGNESIUM HYDROXIDE/ALUMINUM HYDROXICE/SIMETHICONE 120; 1200; 1200 MG/30ML; MG/30ML; MG/30ML
30 SUSPENSION ORAL EVERY 4 HOURS PRN
Status: CANCELLED | OUTPATIENT
Start: 2021-01-21

## 2021-01-21 RX ORDER — LOSARTAN POTASSIUM 25 MG/1
25 TABLET ORAL DAILY
Status: DISCONTINUED | OUTPATIENT
Start: 2021-01-22 | End: 2021-01-21

## 2021-01-21 RX ORDER — LOSARTAN POTASSIUM 25 MG/1
25 TABLET ORAL DAILY
Status: DISCONTINUED | OUTPATIENT
Start: 2021-01-22 | End: 2021-01-29 | Stop reason: HOSPADM

## 2021-01-21 RX ORDER — PRASUGREL 10 MG/1
10 TABLET, FILM COATED ORAL DAILY
Status: CANCELLED | OUTPATIENT
Start: 2021-01-22

## 2021-01-21 RX ORDER — ACETAMINOPHEN 325 MG/1
650 TABLET ORAL EVERY 4 HOURS PRN
Status: CANCELLED | OUTPATIENT
Start: 2021-01-21

## 2021-01-21 RX ORDER — SIMETHICONE 80 MG
80 TABLET,CHEWABLE ORAL 4 TIMES DAILY PRN
Status: CANCELLED | OUTPATIENT
Start: 2021-01-21

## 2021-01-21 RX ORDER — NITROGLYCERIN 0.4 MG/1
0.4 TABLET SUBLINGUAL
Status: CANCELLED | OUTPATIENT
Start: 2021-01-21

## 2021-01-21 RX ORDER — RANOLAZINE 500 MG/1
1000 TABLET, EXTENDED RELEASE ORAL EVERY 12 HOURS
Status: DISCONTINUED | OUTPATIENT
Start: 2021-01-21 | End: 2021-01-29 | Stop reason: HOSPADM

## 2021-01-21 RX ORDER — FUROSEMIDE 40 MG/1
40 TABLET ORAL DAILY
Status: DISCONTINUED | OUTPATIENT
Start: 2021-01-22 | End: 2021-01-24

## 2021-01-21 RX ADMIN — LOPERAMIDE HYDROCHLORIDE 2 MG: 2 CAPSULE ORAL at 14:29

## 2021-01-21 RX ADMIN — LOSARTAN POTASSIUM 25 MG: 25 TABLET, FILM COATED ORAL at 09:58

## 2021-01-21 RX ADMIN — LOPERAMIDE HYDROCHLORIDE 2 MG: 2 CAPSULE ORAL at 19:56

## 2021-01-21 RX ADMIN — Medication 250 MG: at 09:58

## 2021-01-21 RX ADMIN — RANOLAZINE 1000 MG: 500 TABLET, FILM COATED, EXTENDED RELEASE ORAL at 09:59

## 2021-01-21 RX ADMIN — PRASUGREL HYDROCHLORIDE 10 MG: 10 TABLET, FILM COATED ORAL at 09:59

## 2021-01-21 RX ADMIN — RANOLAZINE 1000 MG: 500 TABLET, FILM COATED, EXTENDED RELEASE ORAL at 22:00

## 2021-01-21 RX ADMIN — METOPROLOL SUCCINATE 50 MG: 50 TABLET, EXTENDED RELEASE ORAL at 09:58

## 2021-01-21 RX ADMIN — PANTOPRAZOLE SODIUM 40 MG: 40 TABLET, DELAYED RELEASE ORAL at 05:31

## 2021-01-21 RX ADMIN — RIVAROXABAN 15 MG: 15 TABLET, FILM COATED ORAL at 19:56

## 2021-01-21 RX ADMIN — ATORVASTATIN CALCIUM 20 MG: 20 TABLET, FILM COATED ORAL at 16:32

## 2021-01-21 RX ADMIN — FUROSEMIDE 40 MG: 40 TABLET ORAL at 09:58

## 2021-01-21 NOTE — PLAN OF CARE
Problem: Prexisting or High Potential for Compromised Skin Integrity  Goal: Skin integrity is maintained or improved  Description: INTERVENTIONS:  - Identify patients at risk for skin breakdown  - Assess and monitor skin integrity  - Assess and monitor nutrition and hydration status  - Monitor labs   - Assess for incontinence   - Turn and reposition patient  - Assist with mobility/ambulation  - Relieve pressure over bony prominences  - Avoid friction and shearing  - Provide appropriate hygiene as needed including keeping skin clean and dry  - Evaluate need for skin moisturizer/barrier cream  - Collaborate with interdisciplinary team   - Patient/family teaching  - Consider wound care consult   Outcome: Progressing     Problem: CARDIOVASCULAR - ADULT  Goal: Maintains optimal cardiac output and hemodynamic stability  Description: INTERVENTIONS:  - Monitor I/O, vital signs and rhythm  - Monitor for S/S and trends of decreased cardiac output  - Administer and titrate ordered vasoactive medications to optimize hemodynamic stability  - Assess quality of pulses, skin color and temperature  - Assess for signs of decreased coronary artery perfusion  - Instruct patient to report change in severity of symptoms  Outcome: Progressing  Goal: Absence of cardiac dysrhythmias or at baseline rhythm  Description: INTERVENTIONS:  - Continuous cardiac monitoring, vital signs, obtain 12 lead EKG if ordered  - Administer antiarrhythmic and heart rate control medications as ordered  - Monitor electrolytes and administer replacement therapy as ordered  Outcome: Progressing     Problem: METABOLIC, FLUID AND ELECTROLYTES - ADULT  Goal: Electrolytes maintained within normal limits  Description: INTERVENTIONS:  - Monitor labs and assess patient for signs and symptoms of electrolyte imbalances  - Administer electrolyte replacement as ordered  - Monitor response to electrolyte replacements, including repeat lab results as appropriate  - Instruct patient on fluid and nutrition as appropriate  Outcome: Progressing  Goal: Fluid balance maintained  Description: INTERVENTIONS:  - Monitor labs   - Monitor I/O and WT  - Instruct patient on fluid and nutrition as appropriate  - Assess for signs & symptoms of volume excess or deficit  Outcome: Progressing  Goal: Glucose maintained within target range  Description: INTERVENTIONS:  - Monitor Blood Glucose as ordered  - Assess for signs and symptoms of hyperglycemia and hypoglycemia  - Administer ordered medications to maintain glucose within target range  - Assess nutritional intake and initiate nutrition service referral as needed  Outcome: Progressing     Problem: SKIN/TISSUE INTEGRITY - ADULT  Goal: Skin integrity remains intact  Description: INTERVENTIONS  - Identify patients at risk for skin breakdown  - Assess and monitor skin integrity  - Assess and monitor nutrition and hydration status  - Monitor labs (i e  albumin)  - Assess for incontinence   - Turn and reposition patient  - Assist with mobility/ambulation  - Relieve pressure over bony prominences  - Avoid friction and shearing  - Provide appropriate hygiene as needed including keeping skin clean and dry  - Evaluate need for skin moisturizer/barrier cream  - Collaborate with interdisciplinary team (i e  Nutrition, Rehabilitation, etc )   - Patient/family teaching  Outcome: Progressing  Goal: Incision(s), wounds(s) or drain site(s) healing without S/S of infection  Description: INTERVENTIONS  - Assess and document risk factors for skin impairment   - Assess and document dressing, incision, wound bed, drain sites and surrounding tissue  - Consider nutrition services referral as needed  - Oral mucous membranes remain intact  - Provide patient/ family education  Outcome: Progressing  Goal: Oral mucous membranes remain intact  Description: INTERVENTIONS  - Assess oral mucosa and hygiene practices  - Implement preventative oral hygiene regimen  - Implement oral medicated treatments as ordered  - Initiate Nutrition services referral as needed  Outcome: Progressing     Problem: PAIN - ADULT  Goal: Verbalizes/displays adequate comfort level or baseline comfort level  Description: Interventions:  - Encourage patient to monitor pain and request assistance  - Assess pain using appropriate pain scale  - Administer analgesics based on type and severity of pain and evaluate response  - Implement non-pharmacological measures as appropriate and evaluate response  - Consider cultural and social influences on pain and pain management  - Notify physician/advanced practitioner if interventions unsuccessful or patient reports new pain  Outcome: Progressing     Problem: INFECTION - ADULT  Goal: Absence or prevention of progression during hospitalization  Description: INTERVENTIONS:  - Assess and monitor for signs and symptoms of infection  - Monitor lab/diagnostic results  - Monitor all insertion sites, i e  indwelling lines, tubes, and drains  - Monitor endotracheal if appropriate and nasal secretions for changes in amount and color  - [place] appropriate cooling/warming therapies per order  - Administer medications as ordered  - Instruct and encourage patient and family to use good hand hygiene technique  - Identify and instruct in appropriate isolation precautions for identified infection/condition  Outcome: Progressing     Problem: SAFETY ADULT  Goal: Patient will remain free of falls  Description: INTERVENTIONS:  - Assess patient frequently for physical needs  -  Identify cognitive and physical deficits and behaviors that affect risk of falls    -  [place] fall precautions as indicated by assessment   - Educate patient/family on patient safety including physical limitations  - Instruct patient to call for assistance with activity based on assessment  - Modify environment to reduce risk of injury  - Consider OT/PT consult to assist with strengthening/mobility  Outcome: Progressing  Goal: Maintain or return to baseline ADL function  Description: INTERVENTIONS:  -  Assess patient's ability to carry out ADLs; assess patient's baseline for ADL function and identify physical deficits which impact ability to perform ADLs (bathing, care of mouth/teeth, toileting, grooming, dressing, etc )  - Assess/evaluate cause of self-care deficits   - Assess range of motion  - Assess patient's mobility; develop plan if impaired  - Assess patient's need for assistive devices and provide as appropriate  - Encourage maximum independence but intervene and supervise when necessary  - Involve family in performance of ADLs  - Assess for home care needs following discharge   - Consider OT consult to assist with ADL evaluation and planning for discharge  - Provide patient education as appropriate  Outcome: Progressing  Goal: Maintain or return mobility status to optimal level  Description: INTERVENTIONS:  - Assess patient's baseline mobility status (ambulation, transfers, stairs, etc )    - Identify cognitive and physical deficits and behaviors that affect mobility  - Identify mobility aids required to assist with transfers and/or ambulation (gait belt, sit-to-stand, lift, walker, cane, etc )  - [place] fall precautions as indicated by assessment  - Record patient progress and toleration of activity level on Mobility SBAR; progress patient to next Phase/Stage  - Instruct patient to call for assistance with activity based on assessment  - Consider rehabilitation consult to assist with strengthening/weightbearing, etc   Outcome: Progressing     Problem: DISCHARGE PLANNING  Goal: Discharge to home or other facility with appropriate resources  Description: INTERVENTIONS:  - Identify barriers to discharge w/patient and caregiver  - Arrange for needed discharge resources and transportation as appropriate  - Identify discharge learning needs (meds, wound care, etc )  - Arrange for interpretive services to assist at discharge as needed  - Refer to Case Management Department for coordinating discharge planning if the patient needs post-hospital services based on physician/advanced practitioner order or complex needs related to functional status, cognitive ability, or social support system  Outcome: Progressing

## 2021-01-21 NOTE — PLAN OF CARE
Problem: OCCUPATIONAL THERAPY ADULT  Goal: Performs self-care activities at highest level of function for planned discharge setting  See evaluation for individualized goals  Description: Treatment Interventions: ADL retraining, Functional transfer training, Endurance training, Patient/family training, Equipment evaluation/education, Compensatory technique education, Energy conservation, Activityengagement          See flowsheet documentation for full assessment, interventions and recommendations  Outcome: Progressing  Note: Limitation: Decreased ADL status, Decreased endurance, Decreased self-care trans, Decreased high-level ADLs  Prognosis: Good  Assessment: Patient participated in Skilled OT session 1/21/2021 with interventions consisting of ADL re training with the use of correct body mechnaics, Energy Conservation techniques, safety awareness and fall prevention techniques, therapeutic exercise to: increase functional use of BUEs, increase BUE muscle strength ,  therapeutic activities to: increase activity tolerance, increase standing tolerance time with unilateral UE support to complete sink level ADLs, increase dynamic sit/ stand balance during functional activity , increase postural control, increase trunk control and increase OOB/ sitting tolerance   Patient agreeable to OT treatment session, upon arrival patient was found supine in bed  In comparison to previous session, patient with improvements in UB/LB ADLS, activity tolerance, EOB sitting balance, endurance, dynamic/static sitting, functional mobility, and transfers  Patient requiring verbal cues for correct technique and verbal cues for pacing thru activity steps  Patient continues to be functioning below baseline level, occupational performance remains limited secondary to factors listed above and increased risk for falls and injury  From OT standpoint, recommendation at time of d/c would be Short Term Rehab,when medically stable     Patient to benefit from continued Occupational Therapy treatment while in the hospital to address deficits as defined above and maximize level of functional independence with ADLs and functional mobility        OT Discharge Recommendation: Post-Acute Rehabilitation Services  OT - OK to Discharge: Yes(when medically stable)     Juan Ramon Wilcox MS, OTR/L

## 2021-01-21 NOTE — ASSESSMENT & PLAN NOTE
on 01/11/2021; patient underwent left heart catheterization with placement of 2 drug-eluting stents  · Was on DAPT with asa/effient, statin and BB  · EP stopped aspirin and is on Effient and Xarelto now with new afib issues  · This is the medication discharge recommendation per EP

## 2021-01-21 NOTE — ASSESSMENT & PLAN NOTE
New onset as of 1/17 while at [facility], then had a 6 second pause when converting to NSR  · Cardiology saw patient  She was given amiodarone and placed on lovenox SC  · S/P PPM  · Continue BB   No amiodarone for now  · Was placed on Xarelto by EP team, cost slightly less than $100/month, per family this is affordable

## 2021-01-21 NOTE — PROGRESS NOTES
PHYSICAL MEDICINE AND REHABILITATION   PREADMISSION ASSESSMENT     Projected [facility] Diagnoses:  Impairment of mobility, safety and Activities of Daily Living (ADLs) due to Cardiac:  09 Cardiac    Etiologic Dx: Afib with RVR  Date of Onset: 1/7/2021   Date of surgery: 1/11/2021 Cardiac Catheterization; 1/19/2021 Dual Chamber Permanent Pacemaker Implantation    PATIENT INFORMATION  Name: Marko Rosas Phone #: [phone] (home)   Address: [address] [place] [address]  YOB: 1940 Age: 79 y o  SS#   Marital Status: /Civil Union  Ethnicity:   Employment Status: retired  Extended Emergency Contact Information  Primary Emergency Contact: Leander Fallon  Address: [address] Phone: [phone]  Mobile Phone: [phone]  Relation: Spouse  Secondary Emergency Contact: Enid Mazariegos  Mobile Phone: [phone]  Relation: Daughter   needed? No  Advance Directive: Level 1 Full Code - unknown advanced directive    INSURANCE/COVERAGE:     Primary Payor: MEDICARE / Plan: MEDICARE A AND B / Product Type: Medicare A & B Fee for Service /   Secondary Payer: Aetna   Payer Contact:  Payer Contact:   Contact Phone:  Contact Phone:     Authorization #:   Coverage Dates:  LCD:   MEDICARE #: [id number]  Medicare Days: 57/30/6  Medical Record #: [id number]    REFERRAL SOURCE:   Referring provider: Anali Bee MD  Referring facility: [address] [place] [address]  Room: [address] 904-  PCP: Latoya Barrios MD PCP phone number: [phone]    MEDICAL INFORMATION  HPI: Patient is an 79year old female that presented to [address] [place] [address] on 1/7/2021 with complaints of chest pain for several nights  Patient had recent hospital stay for similar complaints in December 2020  At that time, patient's troponin levels were elevated, with EKG showing LBBB   Cardiology was consulted, with recommendations to increase Amlodipine and PPI  Patient was also treated to NSTEMI  Patient was discharged to home, and was to follow-up with Cardiology was an outpatient  On presentation on 1/7, patient was with noted elevated troponin and BNP  Patient was also in respiratory distress, requiring 15L O2 via NRB, with eventual transition to bipap  CTA PE study was negative for PE, however did show chronic occlusion right subclavian artery  CT of the chest showed RLL pneumonia and small bilateral pleural effusions  Patient was administered IV Lasix and initiated on IV antibiotics  Cardiology was consulted, with recommendations for Heparin gtt and eventual cardiac catheterization once medically improved from CHF standpoint  On 1/11/21, patient underwent cardiac catheterization, with PCI/stenting to left main and LAD  RRT was called on 1/13 due to hypotension and decreased responsiveness  Patient improved with administration of IV fluids  Patient remained stable over the course of the next few days, however on 1/17 patient was noted to be in new onset Afib on telemetry  Patient was administered Amiodarone, as well as loading dose of Digoxin and Metoprolol  Patient converted to NSR, however was found to have a 4 second pause before conversion  Decision was made for BiV pacemaker, and patient was transferred to [place] [address] for EP evaluation  EP was consulted, with 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  Last ECHO completed 12/3/2020 showed an EF of 45%, down from 55% 5/2019  Given patient's significant pause on telemetry and need for beta-blocker, recommendations was made for pacemaker implantation  Of note, patient experienced multiple episodes of diarrhea on 1/18  Cdiff was checked, which resulted negative  On morning of 1/19, patient converted back to Afib with RVR, with HR in 130s to 160s   Patient ultimately underwent dual chamber permanent pacemaker implantation - HIS/Left bundle pacing with deep septal lead to preserve narrow QRS  Per EP, ASA has been discontinued, and patient is to continue on Effient with Xarelto for new Afib issues  CXR showed proper lead placement without evidence of pneumothorax  Device interrogation showed appropriate device function  Of note, her AV delay was shortened to force pace her  Patient is hemodynamically stable, and medically cleared for discharge to [facility]  PT/OT therapies were consulted, and they are recommending patient for inpatient Acute Rehab  She has demonstrated that she can tolerate and participate in 3 hours of therapy per day  COVID test resulted negative on 1/7/2021 and 1/20/2021  Past Medical History:   Past Surgical History: Allergies:     Past Medical History:   Diagnosis Date    Anal fissure     Cardiac disorder     Cognitive changes 12/23/2020    Esophageal reflux     Esophagitis, reflux     Hemorrhoids     Hepatic hemangioma     Last Assessed: 1/13/2015    Herpes zoster     History of colonic polyps     Hypertension     Ischemic colitis ([address]     Lumbar herniated disc     Malignant neoplasm without specification of site ([address] )     Nephrolithiasis     L   Lithotripsy    Nontoxic single thyroid nodule     Last Assessed: 1/13/2015    Osteoarthritis     Overactive bladder     Raynaud disease     Respiratory system disease     Sjogren's disease ([address] )     Spinal stenosis     PONCHO (stress urinary incontinence, female)     Uterovaginal prolapse     Grade I-II    Past Surgical History:   Procedure Laterality Date    APPENDECTOMY  1947    CARDIAC SURGERY      CABG    CATARACT EXTRACTION Bilateral     COLONOSCOPY  2012    Fiberoptic    COLONOSCOPY      Resolved: 2006 - 2012 5 year f/u    CORONARY ANGIOPLASTY WITH STENT PLACEMENT      CORONARY ARTERY BYPASS GRAFT      Resolved: 2012    ESOPHAGOGASTRODUODENOSCOPY  2012    Diagnostic    HEMORROIDECTOMY      KNEE SURGERY      LITHOTRIPSY      Renal    MALIGNANT SKIN LESION EXCISION      Face; Resolved: 2004    ID ESOPHAGOGASTRODUODENOSCOPY TRANSORAL DIAGNOSTIC N/A 4/13/2016    Procedure: EGD AND COLONOSCOPY;  Surgeon: Jatin Delvalle MD;  [facility]; Service: Gastroenterology    RENAL ARTERY STENT      SKIN LESION EXCISION      Scalp    SOFT TISSUE TUMOR RESECTION      Shoulder; Resolved: 1995    THROMBOLYSIS      Postoperative Thrombolysis PTCA    TONSILLECTOMY      Resolved: 1944     Allergies   Allergen Reactions    Sulfa Antibiotics Anaphylaxis    Formaldehyde     Codeine Palpitations         Comorbidities/Surgeries in the last 100 days: acute on chronic CHF, renal artery stenosis, RLL pneumonia, acute hypoxic respiratory failure, urinary retention, CAD s/p CABG, HLD, GERD, HTN, 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, dysphagia    CURRENT VITAL SIGNS:   Temp:  [97 7 °F (36 5 °C)-99 1 °F (37 3 °C)] 98 7 °F (37 1 °C)  HR:  [77-89] 77  Resp:  [16-19] 16  BP: ()/(40-60) 114/42   Intake/Output Summary (Last 24 hours) at 1/21/2021 1322  Last data filed at 1/21/2021 1001  Gross per 24 hour   Intake 100 ml   Output 250 ml   Net -150 ml        LABORATORY RESULTS:      Lab Results   Component Value Date    HGB 11 0 (L) 01/19/2021    HGB 12 1 12/24/2015    HCT 35 8 01/19/2021    HCT 37 7 12/24/2015    WBC 7 36 01/19/2021    WBC 4 91 12/24/2015     Lab Results   Component Value Date    BUN 11 01/19/2021    BUN 9 12/24/2015     12/24/2015    K 3 7 01/19/2021    K 3 6 12/24/2015     01/19/2021     12/24/2015    GLUCOSE 97 12/24/2015    CREATININE 0 76 01/19/2021    CREATININE 0 77 12/24/2015     Lab Results   Component Value Date    PROTIME 12 3 01/11/2021    PROTIME 12 5 12/21/2015    INR 0 91 01/11/2021    INR 0 99 12/21/2015        DIAGNOSTIC STUDIES:  Xr Chest Portable    Result Date: 1/20/2021  Impression: Status post placement of transvenous pacemaker without evidence of pneumothorax    Hazy opacities in the lungs as above described with follow-up recommended  Workstation performed: [id number]     Xr Chest 2 Views    Result Date: 1/20/2021  Impression: Dual-chamber pacemaker well-positioned with no pneumothorax  Small bilateral loculated pleural effusions  Workstation performed: [id number]       PRECAUTIONS/SPECIAL NEEDS:  Tobacco:   Social History     Tobacco Use   Smoking Status Never Smoker   Smokeless Tobacco Never Used   , Alcohol:    Social History     Substance and Sexual Activity   Alcohol Use Yes    Frequency: Monthly or less    Comment: social   , Anticoagulation:  Effient & Xarelto 15mg PO daily, Edema Management, Safety Concerns, Pain Management, IV: Type: Peripheral Location: Left Forearm Reason: Medications/IVF, Aspiration Risk/Precautions, Dietary Restrictions: Dysphagia 3 diet with thin liquids, Visually Impaired, Language Preference: English, Pacemaker Precautions and Fall Precautions      MEDICATIONS:     Current Facility-Administered Medications:     acetaminophen (TYLENOL) tablet 650 mg, 650 mg, Oral, Q4H PRN, Radames Pérez MD, 650 mg at 01/19/21 1819    aluminum-magnesium hydroxide-simethicone (MYLANTA) oral suspension 30 mL, 30 mL, Oral, Q4H PRN, Radames Pérez MD    atorvastatin (LIPITOR) tablet 20 mg, 20 mg, Oral, Daily With Jarett Garcia MD, 20 mg at 01/20/21 1724    Diclofenac Sodium (VOLTAREN) 1 % topical gel 2 g, 2 g, Topical, 4x Daily, Radames Pérez MD, 2 g at 01/18/21 2108    furosemide (LASIX) tablet 40 mg, 40 mg, Oral, Daily, Radames Pérez MD, 40 mg at 01/21/21 2028    loperamide (IMODIUM) capsule 2 mg, 2 mg, Oral, 4x Daily PRN, Manuel Dooley PA-C, 2 mg at 01/20/21 5558    losartan (COZAAR) tablet 25 mg, 25 mg, Oral, Daily, Radames Pérez MD, 25 mg at 01/21/21 0958    metoprolol succinate (TOPROL-XL) 24 hr tablet 50 mg, 50 mg, Oral, Daily, Radames Pérez MD, 50 mg at 01/21/21 0958    nitroglycerin (NITROSTAT) SL tablet 0 4 mg, 0 4 mg, Sublingual, Q5 Min PRN, Radames Pérez MD    ondansetron [facility] injection 4 mg, 4 mg, Intravenous, Q6H PRN, Yajaira Solorzano MD, 4 mg at 01/20/21 8426    oxyCODONE (ROXICODONE) IR tablet 2 5 mg, 2 5 mg, Oral, Q4H PRN, Yajaira Solorzano MD, 2 5 mg at 01/20/21 0227    oxyCODONE (ROXICODONE) IR tablet 5 mg, 5 mg, Oral, Q4H PRN, Yajaira Solorzano MD, 5 mg at 01/19/21 2118    pantoprazole (PROTONIX) EC tablet 40 mg, 40 mg, Oral, Early Morning, Yajaira Solorzano MD, 40 mg at 01/21/21 0531    prasugrel (EFFIENT) tablet 10 mg, 10 mg, Oral, Daily, Yajaira Solorzano MD, 10 mg at 01/21/21 9142    ranolazine (RANEXA) 12 hr tablet 1,000 mg, 1,000 mg, Oral, BID, Yajaira Solorzano MD, 1,000 mg at 01/21/21 6568    rivaroxaban (XARELTO) tablet 15 mg, 15 mg, Oral, Daily With Dinner, Kai Mendez PA-C, 15 mg at 01/20/21 1724    saccharomyces boulardii (FLORASTOR) capsule 250 mg, 250 mg, Oral, BID, Yajaira Solorzano MD, 250 mg at 01/21/21 0673    simethicone (MYLICON) chewable tablet 80 mg, 80 mg, Oral, 4x Daily PRN, Yajaira Solorzano MD    SKIN INTEGRITY:   no rashes, no erythema, ecchymoses - arm(s) bilateral, abdomen, groin, right groin puncture site HERSON, Incision: healing well, no significant drainage, no dehiscence, no significant erythema, left chest incision with silver dressing    PRIOR LEVEL OF FUNCTION:  She lives in [facility] single family home  Peter Worrell is  and lives with their spouse  Self Care: Independent, Indoor Mobility: Independent, Stairs (in/outdoor): Independent and Cognition: Independent  Prior to patient's admission, patient was fully Independent with ADLs and IADLs, including driving  Patient was Independent without use of AD for mobility  FALLS IN THE LAST 6 MONTHS: none    HOME ENVIRONMENT:  The living area: can live on one level  There are 2 steps to enter the home  The patient will have 24 hour supervision/physical assistance available upon discharge  Patient's spouse is supportive and able to assist as needed  PREVIOUS DME:  Equipment in home (previous DME):  Shower Chair, Grab Bars, Rolling Walker and Single [employer]    FUNCTIONAL STATUS:  Physical Therapy Occupational Therapy Speech Therapy   1/21/2021, per PT    Bed Mobility   Supine to Sit 3  Moderate assistance   Additional items Assist x 1   Sit to Supine 3  Moderate assistance   Additional items Assist x 1   Transfers   Sit to Stand 3  Moderate assistance   Additional items Assist x 1   Stand to Sit 3  Moderate assistance   Additional items Assist x 1; Increased time required   Ambulation/Elevation   Gait pattern Excessively slow; Shuffling;Decreased foot clearance   Gait Assistance 3  Moderate assist   Additional items Assist x 1   Assistive Device Other (Comment)  (HHA)   Distance 12+35+15  (short standing rest breaks)   Stair Management Assistance Not tested   Balance   Static Sitting Fair -   Dynamic Sitting Fair -   Static Standing Poor +   Dynamic Standing Poor   Ambulatory Poor   Endurance Deficit   Endurance Deficit Yes   Endurance Deficit Description fatigue, generalized weakness and pain   Activity Tolerance   Activity Tolerance Patient limited by fatigue;Patient limited by pain   Medical Staff Made Aware OT   Nurse Made Aware yes, nsg gave clearance to work with PT   Exercises   Balance training  Dynamic sitting and standing during ADLs   Assessment   Prognosis Fair   Problem List Decreased strength;Decreased endurance; Impaired balance;Decreased mobility; Decreased safety awareness;Decreased range of motion   Assessment Pt continues to require increased time and A to scoot EOB with decreased strength, pain, and PPM precautions in LUE  Tolerated sitting without minimal occasional tactile instruction for sitting balance  Required A for standing balance with static and dymanic standing deficits  Required instruction for upright posture in standing  Ambulated with slow moderately unsteady gait  Decreased foot clearance and forward flexion during ambulation    Decreased activity tolerance compared to baseline requiring frequent standing rest breaks  Pt will benefit from trial with one handed device to improve functional independence  1/21/2021, per OT    ADL   Where Assessed Edge of bed   Grooming Assistance 5  Supervision/Setup   Grooming Deficit Setup;Verbal cueing;Supervision/safety; Increased time to complete;Brushing hair   Grooming Comments Pt completed grooming while seated EOB w/ setup  Able to comb hair w/ use of RUE; reached both ipsilaterally and contralaterally  Increased time required   UB Bathing Assistance 4  Minimal Assistance   UB Bathing Deficit Setup;Verbal cueing;Supervision/safety; Increased time to complete; Chest;Right arm;Left arm; Abdomen   UB Bathing Comments Pt completed UB bathing while seated EOB w/ setup  Pt able to bathe R/L arm, chest and stomach  Assist to bathe armpits bilaterally and backside   LB Bathing Assistance 3  Moderate Assistance   LB Bathing Deficit Setup;Verbal cueing; Increased time to complete;Supervision/safety; Perineal area; Buttocks;Right upper leg;Left upper leg;Right lower leg including foot; Left lower leg including foot   LB Bathing Comments pt completed LB bathing while seated EOB w/ setup  pt able to bathe R/L upper legs w/ VC to initiate  Pt able to bathe perineal area in standing w/ MIn A for steadying balance  Pt needed assist for lower half of B/L legs and buttocks   UB Dressing Assistance 3  Moderate Assistance   UB Dressing Deficit Setup;Verbal cueing;Supervision/safety; Increased time to complete; Thread RUE; Thread LUE;Pull around back; Fasteners   UB Dressing Comments Pt required Mod A to don/doff hospital gown  Assist to tie/untie around neck  Pt able to thread/unthread B/L sleeves over hands but needed assist to pull up/down from shoulders bilaterally   LB Dressing Assistance 2  Maximal Assistance   LB Dressing Deficit Setup;Steadying;Verbal cueing;Supervision/safety; Increased time to complete; Don/doff R sock; Don/doff L sock; Thread RLE into underwear; Thread LLE into underwear   LB Dressing Comments Pt required total A to don/doff socks while seated EOB  pt attempted to initiate task w/ VC however reported "I can't do it w/ my L arm like this " Pt unable to cross leg over other leg to assist w/ don/doffing of socks  Reports never having used a sock-aide and reported spouse could assist w/ LB dressing @ home if needed  pt then required assist to thread underwear over B/L feet and lower half of legs  Pt able to assist pulling underwear up over waist in standing w/Min A for steadying balance   Toileting Assistance  1  Total Assistance   Toileting Deficit Perineal hygiene;Clothing management up;Clothing management down; Increased time to complete;Supervison/safety;Verbal cueing;Steadying;Setup   Toileting Comments Pt incontinent of bowel x2 in standing, required total A to manage hygiene and Max A to manage underwear  Attempted to manage hygiene from front side however was wiping from back to front and given VC to not perform hygiene in that manner 2/2 being uncleanly (smearing bowel movement into vagina)   Bed Mobility   Supine to Sit 4  Minimal assistance   Additional items Assist x 1; Increased time required;Verbal cues;LE management   Sit to Supine 3  Moderate assistance   Additional items Assist x 1; Increased time required;Verbal cues;LE management   Additional Comments pt went from supine to sit w/ Min A x1 for UB support and LE management, HOB elevated for assist  Sat EOB w/ Fair sitting balance/trunk control for 20 mins while completing EOB ADLS  Transfers   Sit to Stand 3  Moderate assistance   Additional items Assist x 1; Increased time required;Verbal cues   Stand to Sit 3  Moderate assistance   Additional items Assist x 1; Increased time required;Verbal cues   Additional Comments Pt performed x4 STS from EOB w/ Mod A x1, HHA used into standing (RUE); VC for safety/proper technique and upright posture   Functional Mobility   Functional Mobility 3  Moderate assistance Additional Comments pt ambulated short household distance w/ mod A x1, HHA used RUE; VC for safety; limited by fatigue   Additional items Hand hold assistance   Cognition   Overall Cognitive Status WFL   Arousal/Participation Responsive; Cooperative   Attention Within functional limits   Orientation Level Oriented to person;Oriented to place;Oriented to time;Oriented to situation  (w/ cueing)   Memory Decreased recall of recent events;Decreased recall of precautions   Following Commands Follows one step commands with increased time or repetition   Comments Pt is pleasant and cooperative; oriented to self and general place (thought she was @ [facility]); looked @ whiteboard to remember date  Needs occasional VC for safety/pacing during functional tasks   Activity Tolerance   Activity Tolerance Patient limited by fatigue;Patient limited by pain   Medical Staff Made Aware PT, RN   Assessment   Assessment Patient participated in Skilled OT session 1/21/2021 with interventions consisting of ADL re training with the use of correct body mechnaics, Energy Conservation techniques, safety awareness and fall prevention techniques, therapeutic exercise to: increase functional use of BUEs, increase BUE muscle strength ,  therapeutic activities to: increase activity tolerance, increase standing tolerance time with unilateral UE support to complete sink level ADLs, increase dynamic sit/ stand balance during functional activity , increase postural control, increase trunk control and increase OOB/ sitting tolerance   Patient agreeable to OT treatment session, upon arrival patient was found supine in bed  In comparison to previous session, patient with improvements in UB/LB ADLS, activity tolerance, EOB sitting balance, endurance, dynamic/static sitting, functional mobility, and transfers  Patient requiring verbal cues for correct technique and verbal cues for pacing thru activity steps   Patient continues to be functioning below baseline level, occupational performance remains limited secondary to factors listed above and increased risk for falls and injury  From OT standpoint, recommendation at time of d/c would be Short Term Rehab,when medically stable  Patient to benefit from continued Occupational Therapy treatment while in the hospital to address deficits as defined above and maximize level of functional independence with ADLs and functional mobility  1/16/2021, per SLP    Summary    Pt presents with mild oral dysphagia and suspected mild pharyngeal dysphagia  Patient had mild prolonged mastication with mild diffuse oral residue on lingual surface after swallowing dry solid  Suspected mild swallow delay  No overt s/s of aspiration  Noted that patient had whitish spots on lingual surface  SLP reported this to RN and physician      Recommendations:   Diet: soft/level 3 diet and thin liquids   Meds: crushed with puree   Frequent Oral care: 4x/day  Aspiration precautions and compensatory swallowing strategies: upright posture, only feed when fully alert, slow rate of feeding, small bites/sips and alternating bites and sips  Other Recommendations/ considerations: Speech Pathology to follow to ensure safety at diet level; Therapeutic feeding with increased textures and adv diet based upon clinical performance         Current Medical Status  Pt is a 79 y o  female [facility] with SOB  Past medical history with CAD s/p CABG and LAMINE, HTN, HLD, and cognitive changes      Patient was seen in [facility] and was admitted from 12/2-12/5/2020  for CP/angina and suspected Type 1 NSTEMI which was managed medically with nitro and heparin GTT and adjusting antianginal regimen, was placed on Lasix for acute pulmonary edema      Patient was again seen on 12/23/20, for chest pain, ECG on admission revealed LBBB   Trops were trended 0 07, 0 65, and was discharged the same day  Cardiology saw patient and recommended increase her amlodipine from 5mg to 10mg and start PPI  Close follow up with her PCP and Cardiologist recommended       From discharge was having persistent Chest pain around three times a  day, usually happens with activity but happens during eating and would sometime wake her up in the middle of the night while sleeping, would take  Nitroglycerin x2 and pain will be relieved in 20 minutes   Patient report that they were using it so many times that they ran out    5pm today had chest pain unrelieved by two nitroglycerin describe as squeezing or pulling her chest apart, 10/10, worst with activity, doesn't seem to radiate worsen with breathing and accompanied with SOB, patient told us it has been months since she was able to lay flat   Patient also has some leg swelling   According to the  patient is compliant with all her medication, unfortunately the diet not so much, since he is not a great cook, he does not keep an eye on sodium intake   Patient was taken in ER   While patient was in the ER was laid flat, started have significant shortness of breath   Was placed on BiPAP per H&P on 1/8/21     Past medical history:   Please see H&P for details     Special Studies:  1/13 CT ABD: Nonspecific enteritis/colitis, likely infectious   No bowel obstruction or bowel pneumatosis    Right lower lobe consolidation has resolved      1/11 CT ABD: Right lower lobe pneumonia   Small bilateral effusions    Gallstone without surrounding inflammation      1/11 Chest XRAY: 1   Increasing interstitial opacities in the right lung   Pneumonia versus worsening edema  2   Small bilateral pleural effusions, increasing     Social/Education/Vocational Hx:  Pt lives with family    Swallow Information   Current Risks for Dysphagia & Aspiration: baseline hx of cognitive deficits  Current Symptoms/Concerns: Elva RN reports patient was vomiting and choking yesterday  Current Diet: NPO except medications   Baseline Diet: regular diet and thin liquids    Baseline Assessment   Behavior/Cognition: alert  Speech/Language Status: able to participate in basic conversation and able to follow commands  Patient Positioning: upright in bed      Swallow Mechanism Exam   Facial: symmetrical  Labial: WFL  Lingual: WFL  Velum: symmetrical  Mandible: adequate ROM  Dentition: adequate  Vocal quality:clear/adequate   Volitional Cough: Did not elicit    Respiratory: room air     Consistencies Assessed and Performance   Consistencies Administered: thin liquids, puree and hard solids    Oral Stage:   Mild prolonged mastication of regular textures with mild diffuse oral residue on lingual surface after swallowing dry solid     Pharyngeal Stage:   Suspected mild swallow delay  No overt s/s of aspiration    Esophageal Concerns: none reported       Results Reviewed with: patient, RN and MD   Dysphagia Goals: pt will tolerate Dysphagia III with thin liquids without s/s of aspiration xacross all meals  Speech Therapy Prognosis   Prognosis: good     Prognosis Considerations: therapeutic potential     CURRENT GAP IN FUNCTION  Prior to Admission: Functional Status: Patient was independent with mobility/ambulation, transfers, ADL's, IADL's  Estimated length of stay: 10 to 14 days    Anticipated Post-Discharge Disposition/Treatment  Disposition: Return to previous home/apartment    Outpatient Services: Physical Therapy (PT), Occupational Therapy (OT) and Speech Therapy    BARRIERS TO DISCHARGE  Weakness, Pain, Balance Difficulty, Fatigue, Home Accessibility, Caregiver Accessibility, Financial Resources, Equipment Needs and Resource Availability    INTERVENTIONS FOR DISCHARGE  Adaptive equipment, Patient/Family/Caregiver Education, [employer], Support Group, Financial Assistance, Arrange DME needs, Medication Changes as per MD recommendations, Therapy exercises, Center of balance support  and Energy conservation education     REQUIRED THERAPY:  Patient will require PT, OT and ST 60 minutes each per day, five days per week to achieve rehab goals  REQUIRED FUNCTIONAL AND MEDICAL MANAGEMENT FOR INPATIENT REHABILITATION:  Skin:  There are no pressure sores currently, B/L UE, abdomen, right groin ecchymosis, left chest incision with silver dressing, right groin puncture site HERSON, Pain Management: Overall pain is well controlled, Deep Vein Thrombosis (DVT) Prophylaxis:  Effient & Xarelto 15mg PO daily, further IM management of additional medical conditions while on the [facility], she needs PT/OT/ST intervention, patient/family education and training, possible Neuropsych and EP consults with any other needed consults prn, nursing medication review and management of bowel/bladder function  RECOMMENDED LEVEL OF CARE:  Patient presented to [address] [place] [address] on 1/7/2021 with complaints of chest pain for several nights  Patient had recent hospital stay for similar complaints in December 2020  At that time, patient's troponin levels were elevated, with EKG showing LBBB  Cardiology was consulted, with recommendations to increase Amlodipine and PPI  Patient was also treated to NSTEMI  Patient was discharged to home, and was to follow-up with Cardiology was an outpatient  On presentation on 1/7, patient was with noted elevated troponin and BNP  Patient was also in respiratory distress, requiring 15L O2 via NRB, with eventual transition to bipap  CTA PE study was negative for PE, however did show chronic occlusion right subclavian artery  CT of the chest showed RLL pneumonia and small bilateral pleural effusions  Patient was administered IV Lasix and initiated on IV antibiotics  Cardiology was consulted, with recommendations for Heparin gtt and eventual cardiac catheterization once medically improved from CHF standpoint  On 1/11/21, patient underwent cardiac catheterization, with PCI/stenting to left main and LAD   RRT was called on 1/13 due to hypotension and decreased responsiveness  Patient improved with administration of IV fluids  Patient remained stable over the course of the next few days, however on 1/17 patient was noted to be in new onset Afib on telemetry  Patient was administered Amiodarone, as well as loading dose of Digoxin and Metoprolol  Patient converted to NSR, however was found to have a 4 second pause before conversion  Decision was made for BiV pacemaker, and patient was transferred to [facility] for EP evaluation  EP was consulted, with 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  Last ECHO completed 12/3/2020 showed an EF of 45%, down from 55% 5/2019  Given patient's significant pause on telemetry and need for beta-blocker, recommendations was made for pacemaker implantation  Of note, patient experienced multiple episodes of diarrhea on 1/18  Cdiff was checked, which resulted negative  On morning of 1/19, patient converted back to Afib with RVR, with HR in 130s to 160s  Patient ultimately underwent dual chamber permanent pacemaker implantation - HIS/Left bundle pacing with deep septal lead to preserve narrow QRS  Per EP, ASA has been discontinued, and patient is to continue on Effient with Xarelto for new Afib issues  CXR showed proper lead placement without evidence of pneumothorax  Device interrogation showed appropriate device function  Of note, her AV delay was shortened to force pace her  Prior to patient's admission, patient was fully Independent with ADLs and IADLs, including driving  Patient was Independent without use of AD for mobility  Currently, patient is Mod assist with HHA for gait and transfers, and Min/Mod assist for UB ADLs, Mod/Max assist for LB ADLs  Close medical management and PM&R management is recommended at this time while patient is on the [facility]  Inpatient acute rehab is recommended for patient to maximize overall strength and mobility to Supervision/Modified Independent upon discharge to home with support of family

## 2021-01-21 NOTE — CASE MANAGEMENT
Patient has be3en [place] [facility] [place] at [facility] 17 room 964

## 2021-01-21 NOTE — ASSESSMENT & PLAN NOTE
BP currently acceptable though she did have some hypotension on 1/19  · BP low at times, pressures better if taken in L arm  · Continue meds with hold parameters

## 2021-01-21 NOTE — PROGRESS NOTES
had introductory visit with Geena Zarco  She is supported by her  and lots of prayer  She says she has a "good team"  She is surprised by how tired she is and really wants her strength back  We discussed patience and rest    offered encouragement and prayer  Geena Zarco would like a follow up visit from

## 2021-01-21 NOTE — PLAN OF CARE
Problem: PAIN - ADULT  Goal: Verbalizes/displays adequate comfort level or baseline comfort level  Description: Interventions:  - Encourage patient to monitor pain and request assistance  - Assess pain using appropriate pain scale  - Administer analgesics based on type and severity of pain and evaluate response  - Implement non-pharmacological measures as appropriate and evaluate response  - Consider cultural and social influences on pain and pain management  - Notify physician/advanced practitioner if interventions unsuccessful or patient reports new pain  Outcome: Progressing     Problem: INFECTION - ADULT  Goal: Absence or prevention of progression during hospitalization  Description: INTERVENTIONS:  - Assess and monitor for signs and symptoms of infection  - Monitor lab/diagnostic results  - Monitor all insertion sites, i e  indwelling lines, tubes, and drains  - Monitor endotracheal if appropriate and nasal secretions for changes in amount and color  - [place] appropriate cooling/warming therapies per order  - Administer medications as ordered  - Instruct and encourage patient and family to use good hand hygiene technique  - Identify and instruct in appropriate isolation precautions for identified infection/condition  Outcome: Progressing  Goal: Absence of fever/infection during neutropenic period  Description: INTERVENTIONS:  - Monitor WBC    Outcome: Progressing     Problem: SAFETY ADULT  Goal: Patient will remain free of falls  Description: INTERVENTIONS:  - Assess patient frequently for physical needs  -  Identify cognitive and physical deficits and behaviors that affect risk of falls    -  [place] fall precautions as indicated by assessment   - Educate patient/family on patient safety including physical limitations  - Instruct patient to call for assistance with activity based on assessment  - Modify environment to reduce risk of injury  - Consider OT/PT consult to assist with strengthening/mobility  Outcome: Progressing  Goal: Maintain or return to baseline ADL function  Description: INTERVENTIONS:  -  Assess patient's ability to carry out ADLs; assess patient's baseline for ADL function and identify physical deficits which impact ability to perform ADLs (bathing, care of mouth/teeth, toileting, grooming, dressing, etc )  - Assess/evaluate cause of self-care deficits   - Assess range of motion  - Assess patient's mobility; develop plan if impaired  - Assess patient's need for assistive devices and provide as appropriate  - Encourage maximum independence but intervene and supervise when necessary  - Involve family in performance of ADLs  - Assess for home care needs following discharge   - Consider OT consult to assist with ADL evaluation and planning for discharge  - Provide patient education as appropriate  Outcome: Progressing  Goal: Maintain or return mobility status to optimal level  Description: INTERVENTIONS:  - Assess patient's baseline mobility status (ambulation, transfers, stairs, etc )    - Identify cognitive and physical deficits and behaviors that affect mobility  - Identify mobility aids required to assist with transfers and/or ambulation (gait belt, sit-to-stand, lift, walker, cane, etc )  - [place] fall precautions as indicated by assessment  - Record patient progress and toleration of activity level on Mobility SBAR; progress patient to next Phase/Stage  - Instruct patient to call for assistance with activity based on assessment  - Consider rehabilitation consult to assist with strengthening/weightbearing, etc   Outcome: Progressing     Problem: DISCHARGE PLANNING  Goal: Discharge to home or other facility with appropriate resources  Description: INTERVENTIONS:  - Identify barriers to discharge w/patient and caregiver  - Arrange for needed discharge resources and transportation as appropriate  - Identify discharge learning needs (meds, wound care, etc )  - Arrange for interpretive services to assist at discharge as needed  - Refer to Case Management Department for coordinating discharge planning if the patient needs post-hospital services based on physician/advanced practitioner order or complex needs related to functional status, cognitive ability, or social support system  Outcome: Progressing     Problem: Knowledge Deficit  Goal: Patient/family/caregiver demonstrates understanding of disease process, treatment plan, medications, and discharge instructions  Description: Complete learning assessment and assess knowledge base    Interventions:  - Provide teaching at level of understanding  - Provide teaching via preferred learning methods  Outcome: Progressing     Problem: CARDIOVASCULAR - ADULT  Goal: Maintains optimal cardiac output and hemodynamic stability  Description: INTERVENTIONS:  - Monitor I/O, vital signs and rhythm  - Monitor for S/S and trends of decreased cardiac output  - Administer and titrate ordered vasoactive medications to optimize hemodynamic stability  - Assess quality of pulses, skin color and temperature  - Assess for signs of decreased coronary artery perfusion  - Instruct patient to report change in severity of symptoms  Outcome: Progressing  Goal: Absence of cardiac dysrhythmias or at baseline rhythm  Description: INTERVENTIONS:  - Continuous cardiac monitoring, vital signs, obtain 12 lead EKG if ordered  - Administer antiarrhythmic and heart rate control medications as ordered  - Monitor electrolytes and administer replacement therapy as ordered  Outcome: Progressing     Problem: Prexisting or High Potential for Compromised Skin Integrity  Goal: Skin integrity is maintained or improved  Description: INTERVENTIONS:  - Identify patients at risk for skin breakdown  - Assess and monitor skin integrity  - Assess and monitor nutrition and hydration status  - Monitor labs   - Assess for incontinence   - Turn and reposition patient  - Assist with mobility/ambulation  - Relieve pressure over bony prominences  - Avoid friction and shearing  - Provide appropriate hygiene as needed including keeping skin clean and dry  - Evaluate need for skin moisturizer/barrier cream  - Collaborate with interdisciplinary team   - Patient/family teaching  - Consider wound care consult   Outcome: Progressing     Problem: Potential for Falls  Goal: Patient will remain free of falls  Description: INTERVENTIONS:  - Assess patient frequently for physical needs  -  Identify cognitive and physical deficits and behaviors that affect risk of falls    -  [place] fall precautions as indicated by assessment   - Educate patient/family on patient safety including physical limitations  - Instruct patient to call for assistance with activity based on assessment  - Modify environment to reduce risk of injury  - Consider OT/PT consult to assist with strengthening/mobility  Outcome: Progressing

## 2021-01-21 NOTE — OCCUPATIONAL THERAPY NOTE
Occupational Therapy Treatment Note      River Lopezache    1/21/2021    Principal Problem:    Encounter for insertion of cardiac resynchronization therapy pacemaker (CRT-P)  Active Problems:    Essential hypertension    GERD (gastroesophageal reflux disease)    Colitis    Hyperlipidemia    Acute combined systolic and diastolic congestive heart failure (HCC)    Atrial fibrillation with rapid ventricular response ([facility] )    Coronary artery disease involving native coronary artery of native heart with angina pectoris [facility])      Past Medical History:   Diagnosis Date    Anal fissure     Cardiac disorder     Cognitive changes 12/23/2020    Esophageal reflux     Esophagitis, reflux     Hemorrhoids     Hepatic hemangioma     Last Assessed: 1/13/2015    Herpes zoster     History of colonic polyps     Hypertension     Ischemic colitis ([facility] [address] )     Lumbar herniated disc     [facility]     Nephrolithiasis     L   Lithotripsy    Nontoxic single thyroid nodule     Last Assessed: 1/13/2015    Osteoarthritis     Overactive bladder     Raynaud disease     Respiratory system disease     Sjogren's disease ([facility] )     Spinal stenosis     PONCHO (stress urinary incontinence, female)     Uterovaginal prolapse     Grade I-II       Past Surgical History:   Procedure Laterality Date    APPENDECTOMY  1947    CARDIAC SURGERY      CABG    CATARACT EXTRACTION Bilateral     COLONOSCOPY  2012    Fiberoptic    COLONOSCOPY      Resolved: 2006 - 2012 5 year f/u    CORONARY ANGIOPLASTY WITH STENT PLACEMENT      CORONARY ARTERY BYPASS GRAFT      Resolved: 2012    ESOPHAGOGASTRODUODENOSCOPY  2012    Diagnostic    HEMORROIDECTOMY      KNEE SURGERY      LITHOTRIPSY      Renal    MALIGNANT SKIN LESION EXCISION      Face; Resolved: 2004    ME ESOPHAGOGASTRODUODENOSCOPY TRANSORAL DIAGNOSTIC N/A 4/13/2016    Procedure: EGD AND COLONOSCOPY;  Surgeon: Ginny Ahumada MD;  [facility];  Service: Gastroenterology    RENAL ARTERY STENT      SKIN LESION EXCISION      Scalp    SOFT TISSUE TUMOR RESECTION      Shoulder; Resolved: 1995    THROMBOLYSIS      Postoperative Thrombolysis PTCA    TONSILLECTOMY      Resolved: 1944 01/21/21 0919   OT Last Visit   OT Visit Date 01/21/21   Note Type   Note Type Treatment   Restrictions/Precautions   Weight Bearing Precautions Per Order No   Other Precautions Bed Alarm;Telemetry; Fall Risk;Pain  (s/p PPM)   Lifestyle   Autonomy I adls and mobility w/o ad - i iadls - shares homemaking with spouse   Reciprocal Relationships supportive family    Service to Others retired   Intrinsic Gratification mostly sedentary    Pain Assessment   Pain Assessment Tool 0-10   Pain Score 3   Pain Location/Orientation Orientation: Left; Location: Arm   Pain Onset/Description Descriptor: Discomfort   Patient's Stated Pain Goal No pain   Hospital Pain Intervention(s) Repositioned; Ambulation/increased activity; Emotional support   ADL   Where Assessed Edge of bed   Grooming Assistance 5  Supervision/Setup   Grooming Deficit Setup;Verbal cueing;Supervision/safety; Increased time to complete;Brushing hair   Grooming Comments Pt completed grooming while seated EOB w/ setup  Able to comb hair w/ use of RUE; reached both ipsilaterally and contralaterally  Increased time required   UB Bathing Assistance 4  Minimal Assistance   UB Bathing Deficit Setup;Verbal cueing;Supervision/safety; Increased time to complete; Chest;Right arm;Left arm; Abdomen   UB Bathing Comments Pt completed UB bathing while seated EOB w/ setup  Pt able to bathe R/L arm, chest and stomach  Assist to bathe armpits bilaterally and backside   LB Bathing Assistance 3  Moderate Assistance   LB Bathing Deficit Setup;Verbal cueing; Increased time to complete;Supervision/safety; Perineal area; Buttocks;Right upper leg;Left upper leg;Right lower leg including foot; Left lower leg including foot   LB Bathing Comments pt completed LB bathing while seated EOB w/ setup  pt able to bathe R/L upper legs w/ VC to initiate  Pt able to bathe perineal area in standing w/ MIn A for steadying balance  Pt needed assist for lower half of B/L legs and buttocks   UB Dressing Assistance 3  Moderate Assistance   UB Dressing Deficit Setup;Verbal cueing;Supervision/safety; Increased time to complete; Thread RUE; Thread LUE;Pull around back; Fasteners   UB Dressing Comments Pt required Mod A to don/doff hospital gown  Assist to tie/untie around neck  Pt able to thread/unthread B/L sleeves over hands but needed assist to pull up/down from shoulders bilaterally   LB Dressing Assistance 2  Maximal Assistance   LB Dressing Deficit Setup;Steadying;Verbal cueing;Supervision/safety; Increased time to complete; Don/doff R sock; Don/doff L sock; Thread RLE into underwear; Thread LLE into underwear   LB Dressing Comments Pt required total A to don/doff socks while seated EOB  pt attempted to initiate task w/ VC however reported "I can't do it w/ my L arm like this " Pt unable to cross leg over other leg to assist w/ don/doffing of socks  Reports never having used a sock-aide and reported spouse could assist w/ LB dressing @ home if needed  pt then required assist to thread underwear over B/L feet and lower half of legs  Pt able to assist pulling underwear up over waist in standing w/Min A for steadying balance   Toileting Assistance  1  Total Assistance   Toileting Deficit Perineal hygiene;Clothing management up;Clothing management down; Increased time to complete;Supervison/safety;Verbal cueing;Steadying;Setup   Toileting Comments Pt incontinent of bowel x2 in standing, required total A to manage hygiene and Max A to manage underwear   Attempted to manage hygiene from front side however was wiping from back to front and given VC to not perform hygiene in that manner 2/2 being uncleanly (smearing bowel movement into vagina)   Bed Mobility   Supine to Sit 4  Minimal assistance   Additional items Assist x 1; Increased time required;Verbal cues;LE management   Sit to Supine 3  Moderate assistance   Additional items Assist x 1; Increased time required;Verbal cues;LE management   Additional Comments pt went from supine to sit w/ Min A x1 for UB support and LE management, HOB elevated for assist  Sat EOB w/ Fair sitting balance/trunk control for 20 mins while completing EOB ADLS  Transfers   Sit to Stand 3  Moderate assistance   Additional items Assist x 1; Increased time required;Verbal cues   Stand to Sit 3  Moderate assistance   Additional items Assist x 1; Increased time required;Verbal cues   Additional Comments Pt performed x4 STS from EOB w/ Mod A x1, HHA used into standing (RUE); VC for safety/proper technique and upright posture   Functional Mobility   Functional Mobility 3  Moderate assistance   Additional Comments pt ambulated short household distance w/ mod A x1, HHA used RUE; VC for safety; limited by fatigue   Additional items Hand hold assistance   Cognition   Overall Cognitive Status WFL   Arousal/Participation Responsive; Cooperative   Attention Within functional limits   Orientation Level Oriented to person;Oriented to place;Oriented to time;Oriented to situation  (w/ cueing)   Memory Decreased recall of recent events;Decreased recall of precautions   Following Commands Follows one step commands with increased time or repetition   Comments Pt is pleasant and cooperative; oriented to self and general place (thought she was @ [employer]); looked @ whiteboard to remember date   Needs occasional VC for safety/pacing during functional tasks   Activity Tolerance   Activity Tolerance Patient limited by fatigue;Patient limited by pain   Medical Staff Made Aware PT, RN   Assessment   Assessment Patient participated in Skilled OT session 1/21/2021 with interventions consisting of ADL re training with the use of correct body mechnaics, Energy Conservation techniques, safety awareness and fall prevention techniques, therapeutic exercise to: increase functional use of BUEs, increase BUE muscle strength ,  therapeutic activities to: increase activity tolerance, increase standing tolerance time with unilateral UE support to complete sink level ADLs, increase dynamic sit/ stand balance during functional activity , increase postural control, increase trunk control and increase OOB/ sitting tolerance   Patient agreeable to OT treatment session, upon arrival patient was found supine in bed  In comparison to previous session, patient with improvements in UB/LB ADLS, activity tolerance, EOB sitting balance, endurance, dynamic/static sitting, functional mobility, and transfers  Patient requiring verbal cues for correct technique and verbal cues for pacing thru activity steps  Patient continues to be functioning below baseline level, occupational performance remains limited secondary to factors listed above and increased risk for falls and injury  From OT standpoint, recommendation at time of d/c would be Short Term Rehab,when medically stable  Patient to benefit from continued Occupational Therapy treatment while in the hospital to address deficits as defined above and maximize level of functional independence with ADLs and functional mobility  Plan   Treatment Interventions ADL retraining;Functional transfer training; Endurance training;Cognitive reorientation;Patient/family training;Equipment evaluation/education; Compensatory technique education;Continued evaluation; Energy conservation; Activityengagement   Goal Expiration Date 02/01/21   OT Treatment Day 1   OT Frequency 3-5x/wk   Recommendation   OT Discharge Recommendation Post-Acute Rehabilitation Services   OT - OK to Discharge Yes  (when medically stable)   AM-PAC Daily Activity Inpatient   Lower Body Dressing 2   Bathing 2   Toileting 1   Upper Body Dressing 3   Grooming 3   Eating 3   Daily Activity Raw Score 14   Daily Activity Standardized Score (Calc for Raw Score >=11) 33 39   AM-PAC Applied Cognition Inpatient   Following a Speech/Presentation 3   Understanding Ordinary Conversation 4   Taking Medications 4   Remembering Where Things Are Placed or Put Away 3   Remembering List of 4-5 Errands 3   Taking Care of Complicated Tasks 2   Applied Cognition Raw Score 19   Applied Cognition Standardized Score 39 77   Barthel Index   Feeding 5   Bathing 0   Grooming Score 5   Dressing Score 5   Bladder Score 5   Bowels Score 0   Toilet Use Score 5   Transfers (Bed/Chair) Score 5   Mobility (Level Surface) Score 0   Stairs Score 0   Barthel Index Score 30       Margoth Oleary MS, OTR/L

## 2021-01-21 NOTE — DISCHARGE SUMMARY
Discharge- Concepcion Jalloh 1940, 79 y o  female MRN: [id number]    Unit/Bed#: [facility] 212-02 Encounter: [id number]    Primary Care Provider: Alexy Phipps MD   Date and time admitted to hospital: 1/17/2021  7:59 PM    * Encounter for insertion of cardiac resynchronization therapy pacemaker (CRT-P)  Assessment & Plan  Patient was admitted to [facility] for chest pain; noted to have elevated troponins and underwent left heart catheterization with two LAMINE placed to left main and LAD on 1/11  Patient also had aspiration PNA and completed antibiotics course  Was then noted to have atrial fibrillation with sinus pause; EP examined patient and determine that she would benefit from PPM, transferred to [facility]  · S/P PPM placement   · For ARC today    Coronary artery disease involving native coronary artery of native heart with angina pectoris [facility])  Assessment & Plan  on 01/11/2021; patient underwent left heart catheterization with placement of 2 drug-eluting stents  · Was on DAPT with asa/effient, statin and BB  · EP stopped aspirin and is on Effient and Xarelto now with new afib issues  · This is the medication discharge recommendation per EP    Atrial fibrillation with rapid ventricular response [facility])  Assessment & Plan  New onset as of 1/17 while at [facility], then had a 6 second pause when converting to NSR  · Cardiology saw patient  She was given amiodarone and placed on lovenox SC  · S/P PPM  · Continue BB   No amiodarone for now  · Was placed on Xarelto by EP team, cost slightly less than $100/month, per family this is affordable       Acute combined systolic and diastolic congestive heart failure ([address] )  Assessment & Plan  Wt Readings from Last 3 Encounters:   01/21/21 63 7 kg (140 lb 6 4 oz)   01/16/21 65 3 kg (143 lb 15 4 oz)   01/05/21 63 1 kg (139 lb 3 2 oz)     Patient denies shortness of breath, dyspnea on exertion, swelling, or orthopnea; echo with EF of 45% and diffuse hypokinesis  · Daily weights, Intake/Output  · At [facility] was on IV lasix, then transitioned to PO lasix 40 mg daily as of 1/17  · Appears euvolemic   · Monitor closely           Hyperlipidemia  Assessment & Plan  · Continue statin    Colitis  Assessment & Plan  Recent CT obtained for abdominal pain while at [facility] showed possible colitis with likely infectious etiology  · Was recently on abx for aspiration PNA  · Had diarrhea on 1/18, c diff checked and negative  · Supportive care  · PRN imodium     GERD (gastroesophageal reflux disease)  Assessment & Plan  · Continue PPI    Essential hypertension  Assessment & Plan  BP currently acceptable though she did have some hypotension on 1/19  · BP low at times, pressures better if taken in L arm  · Continue meds with hold parameters       Discharging Physician / Practitioner: Marcos Helm PA-C  PCP: Jason Schrader MD  Admission Date:   Admission Orders (From admission, onward)     Ordered        01/17/21 2114  Inpatient Admission  Once                   Discharge Date: 01/21/21    Disposition:      [address] at [facility]       Reason for Admission: Tx from [facility] for PPM    Discharge Diagnoses:     Please see assessment and plan section above for further details regarding discharge diagnoses  Resolved Problems  Date Reviewed: 1/21/2021    None          Consultations During Hospital Stay:  · EP  · Cardiology    Procedures Performed:   · PPM 1/19    Medication Adjustments and Discharge Medications:  · Summary of Medication Adjustments made as a result of this hospitalization:   · Multiple, see med rec  · Medication Dosing Tapers - Please refer to Discharge Medication List for details on any medication dosing tapers (if applicable to patient)  · Medications being temporarily held (include recommended restart time): NA  · Discharge Medication List: See after visit summary for reconciled discharge medications       Wound Care Recommendations:  When applicable, please see wound care section of After Visit Summary  Diet Recommendations at Discharge:  Diet -        Diet Orders   (From admission, onward)             Start     Ordered    01/20/21 1026  Dietary nutrition supplements  Once     Question Answer Comment   Select Supplement: Banatrol Plus Banana Flakes    Mix with: [employer], Lunch, Dinner        01/20/21 1025    01/19/21 1751  Diet Dysphagia/Modified Consistency; Dysphagia 3-Dental Soft; Thin Liquid  Diet effective now     Question Answer Comment   Diet Type Dysphagia/Modified Consistency    Dysphagia/Modified Consistency Dysphagia 3-Dental Soft    Liquid Modifier Thin Liquid    RD to adjust diet per protocol? Yes        01/19/21 1750    01/17/21 2117  Dietary nutrition supplements  Once     Question Answer Comment   Select Supplement: Ensure Compact-Vanilla    Frequency Lunch, Dinner        01/17/21 2116                Instructions for any Catheters / Lines Present at Discharge (including removal date, if applicable): NA    Significant Findings / Test Results:   · See above    Incidental Findings:   · None     Test Results Pending at Discharge (will require follow up):   · none     Outpatient Tests Requested:  · F/u PCP  · F/u cardiologist/EP as scheduled    Complications:  none    Hospital Course:     Concepcion Jalloh is a 79 y o  female patient with PMHx of CAD s/p CABGx4 2011, moderate pulm HTN, HTN, HLD, heart failure with mid-range EF who originally presented to the hospital on 1/17/2021 as a transfer from [address] [place] [address] for EP evaluation/PPM placement  She was found to have symptomatic new onset afib while at [facility] with a significant pause noted when converting to NSR, thus was transferred for PPM      In December 2020 she reported chest pain and was asked to go to the emergency room for further workup  At first, less invasive approach was preferred and patient was sent home with nitro and treated for volume overload    EF was found to be 45%, a drop since prior echo showed EF of 55%  However, as her chest pain persisted, she presented back to the emergency room in January of this year at which point it was recommended that she undergo cardiac catheterization to assess her anatomy  Patient had preferred to wait but as she continued to decompensate, she eventually underwent cardiac catheterization on 01/11 and had drug-eluting stents placed to proximal LAD and ostial left main  She tolerated the catheterization well and was near approaching discharge status when on 1/17 she developed atrial fibrillation with heart rates in the 160s  She was given IV metoprolol and digoxin and was eventually given amiodarone for which she converted with a 6 second pause  She was not symptomatic with this  Amiodarone continue to be loaded  It was felt that she would need a device and she was therefore transferred for further evaluation by EP  Eventually, was medically stable and seen by PT/OT  Family opted for acute rehab and she will be sent to [facility] in [facility] condition  She will need f/u as outlined in EP instructions  Condition at Discharge: stable     Discharge Day Visit / Exam:     Subjective:  Doing better today  Overall feels very tired  Vitals: Blood Pressure: (!) 114/42 (01/21/21 1113)  Pulse: 77 (01/21/21 1113)  Temperature: 98 7 °F (37 1 °C) (01/21/21 1113)  Temp Source: Oral (01/20/21 2148)  Respirations: 16 (01/21/21 1113)  Height: 4' 10" (147 3 cm) (01/17/21 2002)  Weight - Scale: 63 7 kg (140 lb 6 4 oz) (01/21/21 0553)  SpO2: 96 % (01/21/21 1113)  Exam:   Physical Exam  Vitals signs and nursing note reviewed  Constitutional:       General: She is not in acute distress  Comments: L chest wall dressing c/d/i    Cardiovascular:      Rate and Rhythm: Normal rate  Rhythm irregular  Pulmonary:      Effort: No respiratory distress  Abdominal:      General: There is no distension  Tenderness: There is no abdominal tenderness  Musculoskeletal:      Right lower leg: No edema  Left lower leg: No edema  Skin:     Coloration: Skin is pale  Neurological:      Mental Status: She is oriented to person, place, and time  Psychiatric:         Mood and Affect: Mood normal          Discussion with Family: Called  and provided update via phone  Goals of Care Discussions:  · Code Status at Discharge: Level 1 - Full Code  · Were there any Goals of Care Discussions during Hospitalization?: No  · Results of any General Goals of Care Discussions: NA   · POLST Completed: No   · If POLST Completed, Summary of POLST Agreement Provided Here: NA   · OK to Rehospitalize if Needed? Yes    Discharge instructions/Information to patient and family:   See after visit summary section titled Discharge Instructions for information provided to patient and family  Planned Readmission: no      Discharge Statement:  I spent 34 minutes discharging the patient  This time was spent on the day of discharge  I had direct contact with the patient on the day of discharge  Greater than 50% of the total time was spent examining patient, answering all patient questions, arranging and discussing plan of care with patient as well as directly providing post-discharge instructions  Additional time then spent on discharge activities      ** Please Note: This note has been constructed using a voice recognition system **

## 2021-01-21 NOTE — ASSESSMENT & PLAN NOTE
Patient was admitted to [facility] for chest pain; noted to have elevated troponins and underwent left heart catheterization with two LAMINE placed to left main and LAD on 1/11  Patient also had aspiration PNA and completed antibiotics course   Was then noted to have atrial fibrillation with sinus pause; EP examined patient and determine that she would benefit from PPM, transferred to [facility]  · S/P PPM placement   · For ARC today

## 2021-01-21 NOTE — PLAN OF CARE
Problem: PHYSICAL THERAPY ADULT  Goal: Performs mobility at highest level of function for planned discharge setting  See evaluation for individualized goals  Description: Treatment/Interventions: Functional transfer training, LE strengthening/ROM, Therapeutic exercise, Endurance training, Equipment eval/education, Bed mobility, Gait training, Spoke to nursing, OT  Equipment Recommended: Og Paiz       See flowsheet documentation for full assessment, interventions and recommendations  Outcome: Progressing  Note: Prognosis: Fair  Problem List: Decreased strength, Decreased endurance, Impaired balance, Decreased mobility, Decreased safety awareness, Decreased range of motion  Assessment: Pt continues to require increased time and A to scoot EOB with decreased strength, pain, and PPM precautions in LUE  Tolerated sitting without minimal occasional tactile instruction for sitting balance  Required A for standing balance with static and dymanic standing deficits  Required instruction for upright posture in standing  Ambulated with slow moderately unsteady gait  Decreased foot clearance and forward flexion during ambulation  Decreased activity tolerance compared to baseline requiring frequent standing rest breaks  Pt will benefit from trial with one handed device to improve functional independence  Barriers to Discharge: Inaccessible home environment, Decreased caregiver support     PT Discharge Recommendation: [address]     PT - OK to Discharge: Yes    See flowsheet documentation for full assessment

## 2021-01-21 NOTE — PLAN OF CARE
Problem: PAIN - ADULT  Goal: Verbalizes/displays adequate comfort level or baseline comfort level  Description: Interventions:  - Encourage patient to monitor pain and request assistance  - Assess pain using appropriate pain scale  - Administer analgesics based on type and severity of pain and evaluate response  - Implement non-pharmacological measures as appropriate and evaluate response  - Consider cultural and social influences on pain and pain management  - Notify physician/advanced practitioner if interventions unsuccessful or patient reports new pain  Outcome: Progressing     Problem: INFECTION - ADULT  Goal: Absence or prevention of progression during hospitalization  Description: INTERVENTIONS:  - Assess and monitor for signs and symptoms of infection  - Monitor lab/diagnostic results  - Monitor all insertion sites, i e  indwelling lines, tubes, and drains  - Monitor endotracheal if appropriate and nasal secretions for changes in amount and color  - [place] appropriate cooling/warming therapies per order  - Administer medications as ordered  - Instruct and encourage patient and family to use good hand hygiene technique  - Identify and instruct in appropriate isolation precautions for identified infection/condition  Outcome: Progressing  Goal: Absence of fever/infection during neutropenic period  Description: INTERVENTIONS:  - Monitor WBC    Outcome: Progressing     Problem: SAFETY ADULT  Goal: Patient will remain free of falls  Description: INTERVENTIONS:  - Assess patient frequently for physical needs  -  Identify cognitive and physical deficits and behaviors that affect risk of falls    -  [place] fall precautions as indicated by assessment   - Educate patient/family on patient safety including physical limitations  - Instruct patient to call for assistance with activity based on assessment  - Modify environment to reduce risk of injury  - Consider OT/PT consult to assist with strengthening/mobility  Outcome: Progressing  Goal: Maintain or return to baseline ADL function  Description: INTERVENTIONS:  -  Assess patient's ability to carry out ADLs; assess patient's baseline for ADL function and identify physical deficits which impact ability to perform ADLs (bathing, care of mouth/teeth, toileting, grooming, dressing, etc )  - Assess/evaluate cause of self-care deficits   - Assess range of motion  - Assess patient's mobility; develop plan if impaired  - Assess patient's need for assistive devices and provide as appropriate  - Encourage maximum independence but intervene and supervise when necessary  - Involve family in performance of ADLs  - Assess for home care needs following discharge   - Consider OT consult to assist with ADL evaluation and planning for discharge  - Provide patient education as appropriate  Outcome: Progressing  Goal: Maintain or return mobility status to optimal level  Description: INTERVENTIONS:  - Assess patient's baseline mobility status (ambulation, transfers, stairs, etc )    - Identify cognitive and physical deficits and behaviors that affect mobility  - Identify mobility aids required to assist with transfers and/or ambulation (gait belt, sit-to-stand, lift, walker, cane, etc )  - [place] fall precautions as indicated by assessment  - Record patient progress and toleration of activity level on Mobility SBAR; progress patient to next Phase/Stage  - Instruct patient to call for assistance with activity based on assessment  - Consider rehabilitation consult to assist with strengthening/weightbearing, etc   Outcome: Progressing     Problem: DISCHARGE PLANNING  Goal: Discharge to home or other facility with appropriate resources  Description: INTERVENTIONS:  - Identify barriers to discharge w/patient and caregiver  - Arrange for needed discharge resources and transportation as appropriate  - Identify discharge learning needs (meds, wound care, etc )  - Arrange for interpretive services to assist at discharge as needed  - Refer to Case Management Department for coordinating discharge planning if the patient needs post-hospital services based on physician/advanced practitioner order or complex needs related to functional status, cognitive ability, or social support system  Outcome: Progressing     Problem: Knowledge Deficit  Goal: Patient/family/caregiver demonstrates understanding of disease process, treatment plan, medications, and discharge instructions  Description: Complete learning assessment and assess knowledge base    Interventions:  - Provide teaching at level of understanding  - Provide teaching via preferred learning methods  Outcome: Progressing     Problem: CARDIOVASCULAR - ADULT  Goal: Maintains optimal cardiac output and hemodynamic stability  Description: INTERVENTIONS:  - Monitor I/O, vital signs and rhythm  - Monitor for S/S and trends of decreased cardiac output  - Administer and titrate ordered vasoactive medications to optimize hemodynamic stability  - Assess quality of pulses, skin color and temperature  - Assess for signs of decreased coronary artery perfusion  - Instruct patient to report change in severity of symptoms  Outcome: Progressing  Goal: Absence of cardiac dysrhythmias or at baseline rhythm  Description: INTERVENTIONS:  - Continuous cardiac monitoring, vital signs, obtain 12 lead EKG if ordered  - Administer antiarrhythmic and heart rate control medications as ordered  - Monitor electrolytes and administer replacement therapy as ordered  Outcome: Progressing     Problem: Prexisting or High Potential for Compromised Skin Integrity  Goal: Skin integrity is maintained or improved  Description: INTERVENTIONS:  - Identify patients at risk for skin breakdown  - Assess and monitor skin integrity  - Assess and monitor nutrition and hydration status  - Monitor labs   - Assess for incontinence   - Turn and reposition patient  - Assist with mobility/ambulation  - Relieve pressure over bony prominences  - Avoid friction and shearing  - Provide appropriate hygiene as needed including keeping skin clean and dry  - Evaluate need for skin moisturizer/barrier cream  - Collaborate with interdisciplinary team   - Patient/family teaching  - Consider wound care consult   Outcome: Progressing     Problem: Potential for Falls  Goal: Patient will remain free of falls  Description: INTERVENTIONS:  - Assess patient frequently for physical needs  -  Identify cognitive and physical deficits and behaviors that affect risk of falls    -  [place] fall precautions as indicated by assessment   - Educate patient/family on patient safety including physical limitations  - Instruct patient to call for assistance with activity based on assessment  - Modify environment to reduce risk of injury  - Consider OT/PT consult to assist with strengthening/mobility  Outcome: Progressing

## 2021-01-21 NOTE — ASSESSMENT & PLAN NOTE
Wt Readings from Last 3 Encounters:   01/21/21 63 7 kg (140 lb 6 4 oz)   01/16/21 65 3 kg (143 lb 15 4 oz)   01/05/21 63 1 kg (139 lb 3 2 oz)     Patient denies shortness of breath, dyspnea on exertion, swelling, or orthopnea; echo with EF of 45% and diffuse hypokinesis  · Daily weights, Intake/Output  · At [facility] was on IV lasix, then transitioned to PO lasix 40 mg daily as of 1/17  · Appears euvolemic   · Monitor closely

## 2021-01-21 NOTE — ASSESSMENT & PLAN NOTE
Recent CT obtained for abdominal pain while at [facility] showed possible colitis with likely infectious etiology  · Was recently on abx for aspiration PNA  · Had diarrhea on 1/18, c diff checked and negative  · Supportive care  · PRN imodium

## 2021-01-22 ENCOUNTER — TRANSITIONAL CARE MANAGEMENT (OUTPATIENT)
Dept: FAMILY MEDICINE CLINIC | Facility: MEDICAL CENTER | Age: 81
End: 2021-01-22

## 2021-01-22 ENCOUNTER — APPOINTMENT (INPATIENT)
Dept: NON INVASIVE DIAGNOSTICS | Facility: HOSPITAL | Age: 81
DRG: 949 | End: 2021-01-22
Payer: MEDICARE

## 2021-01-22 PROBLEM — R60.0 EDEMA OF LEFT UPPER EXTREMITY: Status: ACTIVE | Noted: 2021-01-22

## 2021-01-22 PROBLEM — R33.9 URINARY RETENTION: Status: ACTIVE | Noted: 2021-01-22

## 2021-01-22 LAB
BACTERIA UR QL AUTO: ABNORMAL /HPF
BILIRUB UR QL STRIP: ABNORMAL
CLARITY UR: ABNORMAL
COLOR UR: ABNORMAL
GLUCOSE UR STRIP-MCNC: ABNORMAL MG/DL
HGB UR QL STRIP.AUTO: NEGATIVE
HYALINE CASTS #/AREA URNS LPF: ABNORMAL /LPF
KETONES UR STRIP-MCNC: ABNORMAL MG/DL
LEUKOCYTE ESTERASE UR QL STRIP: ABNORMAL
NITRITE UR QL STRIP: POSITIVE
NON-SQ EPI CELLS URNS QL MICRO: ABNORMAL /HPF
PH UR STRIP.AUTO: 5.5 [PH]
PROT UR STRIP-MCNC: ABNORMAL MG/DL
RBC #/AREA URNS AUTO: ABNORMAL /HPF
SP GR UR STRIP.AUTO: 1.02 (ref 1–1.03)
UROBILINOGEN UR QL STRIP.AUTO: 0.2 E.U./DL
WBC #/AREA URNS AUTO: ABNORMAL /HPF

## 2021-01-22 PROCEDURE — 97166 OT EVAL MOD COMPLEX 45 MIN: CPT

## 2021-01-22 PROCEDURE — 87086 URINE CULTURE/COLONY COUNT: CPT | Performed by: PHYSICAL MEDICINE & REHABILITATION

## 2021-01-22 PROCEDURE — 92610 EVALUATE SWALLOWING FUNCTION: CPT

## 2021-01-22 PROCEDURE — 97530 THERAPEUTIC ACTIVITIES: CPT

## 2021-01-22 PROCEDURE — 97535 SELF CARE MNGMENT TRAINING: CPT

## 2021-01-22 PROCEDURE — 99232 SBSQ HOSP IP/OBS MODERATE 35: CPT | Performed by: PHYSICAL MEDICINE & REHABILITATION

## 2021-01-22 PROCEDURE — 93971 EXTREMITY STUDY: CPT | Performed by: SURGERY

## 2021-01-22 PROCEDURE — 93971 EXTREMITY STUDY: CPT

## 2021-01-22 PROCEDURE — 81001 URINALYSIS AUTO W/SCOPE: CPT | Performed by: PHYSICAL MEDICINE & REHABILITATION

## 2021-01-22 PROCEDURE — 87186 SC STD MICRODIL/AGAR DIL: CPT | Performed by: PHYSICAL MEDICINE & REHABILITATION

## 2021-01-22 PROCEDURE — 87077 CULTURE AEROBIC IDENTIFY: CPT | Performed by: PHYSICAL MEDICINE & REHABILITATION

## 2021-01-22 PROCEDURE — 97162 PT EVAL MOD COMPLEX 30 MIN: CPT

## 2021-01-22 RX ADMIN — RANOLAZINE 1000 MG: 500 TABLET, FILM COATED, EXTENDED RELEASE ORAL at 21:03

## 2021-01-22 RX ADMIN — ATORVASTATIN CALCIUM 20 MG: 20 TABLET, FILM COATED ORAL at 17:06

## 2021-01-22 RX ADMIN — RIVAROXABAN 15 MG: 15 TABLET, FILM COATED ORAL at 17:06

## 2021-01-22 NOTE — ASSESSMENT & PLAN NOTE
- new onset in acute care   - at home on toprol XL (24 hr) 25 mg qd however was increased to 50 mg after prior hospitalization (IM managing BB)   - started on xarelto 15 mg qdinner in acute care per EP recommendations (CrCl was borderline or below cutoff for 20 mg qdinner dose in acute care therefore EP elected for lower 15 mg dose and while CrCl has been higher at times here in rehab unit will continue to use lower 15 mg dose as CrCl tends to fluctuate above and below cut off and over the last year appears to below cutoff more often than not) -->was on hold due to 1 heme + stool out of 3 samples per GI recommendations; d/w GI and they cleared patient to resume xarelto 15 mg qdinner on 1/28 (which has been done)   - OP FU with Dr Jeff Khan and Mohan Rico (scheduled for 2/18)

## 2021-01-22 NOTE — ASSESSMENT & PLAN NOTE
- h/o CABG  - elevated troponins in acute care and patient is s/p LAMINE on 1/11/21  - at home was on zocor 40 mg qpm however was switched to lipitor 20 mg qpm after prior hospitalization   - home asa stopped in acute care per EP recommendations    - continued on home effient 10 mg qd per EP recommendations--> 1 heme + stool out of 3 samples, d/w GI and they have cleared patient to continue effient at this time   - home ranexa increased from 500 mg q12 to 1000 mg q12 after prior hospitalization    - at home on toprol XL (24 hr) 25 mg qd however increased to 50 mg after prior hospitalization (IM managing BB dose while in rehab unit)   - OP FU with Dr Leah Thibodeaux and Ezekiel Vazquez (scheduled for 2/18)

## 2021-01-22 NOTE — ASSESSMENT & PLAN NOTE
- Hg currently trending up to 10 1 s/p transfusion   - 1 heme+ stool out of 3 samples  - CT abd/pelvis ordered per IM recommendation to r/o retroperitoneal bleed however this did not show any bleed   - per GI recommendations was holding xarelto but continued effient but on 1/28 GI cleared to resume both which has been done   - IM and GI monitoring (no plan for endoscopic evaluation while inpt at this time per GI)

## 2021-01-22 NOTE — PROGRESS NOTES
Pt states that she needed to void, but she did not  Minimal urine output documented throughout day  Scan showed 970ml in bladder  Pt denies feeling uncomfortable, but abd firm and painful to touch  Dr Jose Abel notified and pt str cathed for 950ml  Urine is very dark jason, close to brown  Pt slightly more confused than easrlier in night, but tor procedure without difficulty

## 2021-01-22 NOTE — PROGRESS NOTES
Pt awoke yelling for her   Has been yelling off and on at night  Unable to re-orient her  Pt was inc of stool  Pericare given and pt changed/repositioned  Quietly resting now

## 2021-01-22 NOTE — PROGRESS NOTES
[facility] PT EVAL        01/22/21 1000   Patient Data   Rehab Impairment  Impairment of mobility, safety and Activities of Daily Living (ADLs) due to Cardiac:  Cardiac     Etiologic Diagnosis Afib with RVR   Date of Onset 01/07/21   Support System   Name Patient lives at home with her   She also reports a local child, grandchildren and great grandchildren    Able to provide 24 hour supervision Yes   Able to provide physical help?   (limited)   Home Setup   Type of Home Single Level  ([place] )   Method of Entry [employer] Right   Number of Stairs 2   First Floor Bathroom Grab Bars  (reprots both walk in and step over tub)   First Floor Bathroom Accessibility Grab bars in tub/shower; Shower chair   First Floor Setup Available Yes   Available Equipment Skillset  (has from sister)   Baseline Information   Vocation Other  (retired)   Transportation    Prior IADL Participation   Meal Preparation Partial Participation  (shared with spouse )   Laundry Partial Participation  (shared with spouse )   Home Cleaning Partial Participation  (shared with spouse )   Prior Level of Function   Self-Care 3  Independent - Patient completed the activities by him/herself, with or without an assistive device, with no assistance from a helper  Indoor-Mobility (Ambulation) 3  Independent - Patient completed the activities by him/herself, with or without an assistive device, with no assistance from a helper  Stairs 3  Independent - Patient completed the activities by him/herself, with or without an assistive device, with no assistance from a helper  Functional Cognition 3  Independent - Patient completed the activities by him/herself, with or without an assistive device, with no assistance from a helper  Prior Device Used Z   None of the above   Falls in the Last Year   Number of falls in the past 12 months 0   Restrictions/Precautions   Precautions Aspiration;Bed/chair alarms;Cognitive; Fall Risk;Hard of hearing;Pacemaker;Supervision on toilet/commode   Weight Bearing Restrictions No   ROM Restrictions Yes   LUE ROM Restriction Range Limitation  (Pacemaker Precautions)   Braces or Orthoses Other (Comment)  (sling no longer necessary)   Pain Assessment   Pain Assessment Tool FLACC   Pain Location/Orientation Orientation: Left; Location: Arm;Location: Shoulder   Pain Radiating Towards LUE   Pain Onset/Description Frequency: Intermittent   Effect of Pain on Daily Activities increased pain with movement   Patient's Stated Pain Goal No pain   Hospital Pain Intervention(s) Repositioned   Pain Rating: FLACC (Rest) - Face 0   Pain Rating: FLACC (Rest) - Legs 0   Pain Rating: FLACC (Rest) - Activity 0   Pain Rating: FLACC (Rest) - Cry 0   Pain Rating: FLACC (Rest) - Consolability 0   Score: FLACC (Rest) 0   Pain Rating: FLACC (Activity) - Face 1   Pain Rating: FLACC (Activity) - Legs 0   Pain Rating: FLACC (Activity) - Activity 0   Pain Rating: FLACC (Activity) - Cry 1   Pain Rating: FLACC (Activity) - Consolability 1   Score: FLACC (Activity) 3   Toilet Transfer   Surface Assessed Standard Commode   Transfer Technique Standard   Limitations Noted In Balance; Endurance;Confidence;Problem Solving; Safety; Sequencing;UE Strength;LE Strength   Adaptive Equipment Walker   Positioning Concerns Safety;Cognition   Type of Assistance Needed Physical assistance   Amount of Physical Assistance Provided Total assistance   Comment Patient stood with RW at bedside and reported incontinence of bowel  Perofmred SPT to BSC at max Ax2 however later recommending sit pivots only 2* poor UE support and inability to sequence use of RW; will defer to OT for toileting recommendations    Toilet Transfer CARE Score 1   Toileting   Able to Pull Clothing down no, up no  Able to Manage Clothing Closures No   Limitations Noted In Balance; Coordination;Problem Solving;UE Strength; Sequencing;LE Strength   Transfer Bed/Chair/Wheelchair   Positioning Concerns Cognition   Limitations Noted In Balance; Coordination;Confidence; Endurance;Pain Management;Problem Solving; Sequencing;UE Strength;LE Strength   Adaptive Equipment Roller Walker;None   Type of Assistance Needed Physical assistance   Amount of Physical Assistance Provided Total assistance   Comment performed 2x SPT with max Ax2; recommening sit pivots only at this time 2* poor UE WB, difficulty sequencing, safety and cognition    Chair/Bed-to-Chair Transfer CARE Score 1   Roll Left and Right   Type of Assistance Needed Physical assistance   Amount of Physical Assistance Provided Total assistance   Comment modAx1 with SBA of 2nd person    Roll Left and Right CARE Score 1   Sit to Lying   Type of Assistance Needed Physical assistance   Amount of Physical Assistance Provided Total assistance   Comment guidance for trunk with BLE assist needed   Sit to Lying CARE Score 1   Lying to Sitting on Side of Bed   Type of Assistance Needed Physical assistance   Amount of Physical Assistance Provided 75% or more   Comment max A to sit EOB; retropulsive upon initial sitting then able to maintain balance at CGA/S    Lying to Sitting on Side of Bed CARE Score 2   Sit to Stand   Type of Assistance Needed Physical assistance   Amount of Physical Assistance Provided Total assistance   Comment max Ax1 with occasional assist needed from 2nd person depending on fatigue level    Sit to Stand CARE Score 1   Picking Up Object   Reason if not Attempted Safety concerns   Picking Up Object CARE Score 88   Car Transfer   Reason if not Attempted Safety concerns   Car Transfer CARE Score 88   Ambulation   Primary Mode of Locomotion Prior to Admission Walk   Distance Walked (feet) 7 ft   Assist Device Hand Hold;Other  (LUE HHA and railing on right side )   Gait Pattern Antalgic; Slow Karrie;Decreased foot clearance;R foot drag;L foot drag; Forward Flexion;R knee london;L knee london;Narrow SUSAN;Shuffle;Step to; Improper weight shift   Limitations Noted In Balance; Coordination;Device Management; Endurance; Heel Strike; Safety; Sequencing;Speed;Strength;Swing   Provided Assistance with: Balance;Trunk Support;Weight Shift   Walk Assist Level Maximum Assist;Assist x 1;Chair Follow   Walk 10 Feet   Reason if not Attempted Safety concerns   Walk 10 Feet CARE Score 88   Walk 50 Feet with Two Turns   Reason if not Attempted Safety concerns   Walk 50 Feet with Two Turns CARE Score 88   Walk 150 Feet   Reason if not Attempted Safety concerns   Walk 150 Feet CARE Score 88   Walking 10 Feet on Uneven Surfaces   Reason if not Attempted Safety concerns   Walking 10 Feet on Uneven Surfaces CARE Score 88   Wheel 50 Feet with Two Turns   Reason if not Attempted Activity not applicable   Wheel 50 Feet with Two Turns CARE Score 9   Wheel 150 Feet   Reason if not Attempted Activity not applicable   Wheel 884 Feet CARE Score 9   Curb or Single Stair   Reason if not Attempted Safety concerns   1 Step (Curb) CARE Score 88   4 Steps   Reason if not Attempted Safety concerns   4 Steps CARE Score 88   12 Steps   Reason if not Attempted Activity not applicable   12 Steps CARE Score 9   Comprehension   QI: Comprehension 2  Sometimes Understands: Understands only basic conversations or simple, direct phrases  Frequently requires cues to understand   Expression   QI: Expression 3   Exhibits some difficulty with expressing needs and ideas (e g , some words or finishing thoughts) or speech is not clear   RLE Assessment   RLE Assessment X   Strength RLE   R Hip Flexion 3+/5   R Hip ABduction 3+/5   R Hip ADduction 3+/5   R Knee Flexion 3+/5   R Knee Extension 3+/5   R Ankle Dorsiflexion 3+/5   R Ankle Plantar Flexion 3+/5   LLE Assessment   LLE Assessment X   Strength LLE   L Hip Flexion 3+/5   L Hip ABduction 3+/5   L Hip ADduction 3+/5   L Knee Flexion 3+/5   L Knee Extension 3+/5   L Ankle Dorsiflexion 3+/5   L Ankle Plantar Flexion 3+/5 RUE Assessment   RUE Assessment WFL   LUE Assessment   LUE Assessment X   Coordination   Movements are Fluid and Coordinated 0   Coordination and Movement Description bradykinetic   Sensation   Propioception Partial deficits in the RLE;Partial deficits in the LLE   Cognition   Overall Cognitive Status Impaired   Arousal/Participation Cooperative   Attention Attends with cues to redirect   Orientation Level Oriented to person;Oriented to place;Oriented to situation   Memory Decreased short term memory;Decreased recall of recent events;Decreased recall of precautions   Following Commands Follows one step commands with increased time or repetition   Comments requires repetition, will stay on one topic of conversation even if conversation as moved on, slow to respond, impaired processing   Vision   Vision Comments wears glasses but they are at home   Objective Measure   PT Measure(s) Patient educated on PT POC, goal setting, and purpose of rehab  Reviewed AR[facility] schedule and expectations  PT Findings Session limited 2* frequent need to have BM  Each time patient stood, patient incontinent of bowel  Utilized brief  And, with assist of Yoni Oliva, PCA at the end of session, assisted in rolling and performing toileting hygiene  Discharge Information   Vocational Plan Retired/not working   Patient's Discharge Plan return home with assist of family    Patient's Rehab Expectations "to have less L arm pain"    Barriers to Discharge Home Limited Family Support; Unsafe Home Setup; Decreased Cognitive Function;Decreased Strength;Decreased Endurance;Pain; Safety Considerations   Impressions Patient is a 79year old female who presented to hospital with chest pain and elevated troponins  She underwent cardiac cath and stent placement but developed a-fib and tachy-jillian syndrome  Pace maker placed on 1/19/21  She presents with an additional problem list which includes HTN, CHF, dysphagia, pneumonia  CKD, and Sjogren's disease   She was recommended for rehab to maximize function and safety  She presents functionally with deficits in strength and ROM, balance and righting reactions, LUE pain with pacemaker precautions,poor standing balance and tolerance and poor activity tolerance  These deficits affect her mechanics of gait and safety which is also affected by her changes in cognition which include impaired processing, sequencing and memory  On evaluation, patient needing overall Ax2 for safe mobility  She was able to ambulate with HHA on left and railing at max A with chair follow  Ax2 sit pivot sonly recommended at this time with staff  She presents well below baseline and will require continued skilled PT intervention to maximize function and safety  She presents as a good rehab candidate and will need appx 3 weeks to achieve LTGs      PT Therapy Minutes   PT Time In 1000   PT Time Out 1130   PT Total Time (minutes) 90   PT Mode of treatment - Individual (minutes) 90   PT Mode of treatment - Concurrent (minutes) 0   PT Mode of treatment - Group (minutes) 0   PT Mode of treatment - Co-treat (minutes) 0   PT Mode of Treatment - Total time(minutes) 90 minutes   PT Cumulative Minutes 90   Cumulative Minutes   Cumulative therapy minutes 90

## 2021-01-22 NOTE — PLAN OF CARE
Problem: Prexisting or High Potential for Compromised Skin Integrity  Goal: Skin integrity is maintained or improved  Description: INTERVENTIONS:  - Identify patients at risk for skin breakdown  - Assess and monitor skin integrity  - Assess and monitor nutrition and hydration status  - Monitor labs   - Assess for incontinence   - Turn and reposition patient  - Assist with mobility/ambulation  - Relieve pressure over bony prominences  - Avoid friction and shearing  - Provide appropriate hygiene as needed including keeping skin clean and dry  - Evaluate need for skin moisturizer/barrier cream  - Collaborate with interdisciplinary team   - Patient/family teaching  - Consider wound care consult   Outcome: Progressing     Problem: CARDIOVASCULAR - ADULT  Goal: Maintains optimal cardiac output and hemodynamic stability  Description: INTERVENTIONS:  - Monitor I/O, vital signs and rhythm  - Monitor for S/S and trends of decreased cardiac output  - Administer and titrate ordered vasoactive medications to optimize hemodynamic stability  - Assess quality of pulses, skin color and temperature  - Assess for signs of decreased coronary artery perfusion  - Instruct patient to report change in severity of symptoms  Outcome: Progressing  Goal: Absence of cardiac dysrhythmias or at baseline rhythm  Description: INTERVENTIONS:  - Continuous cardiac monitoring, vital signs, obtain 12 lead EKG if ordered  - Administer antiarrhythmic and heart rate control medications as ordered  - Monitor electrolytes and administer replacement therapy as ordered  Outcome: Progressing     Problem: METABOLIC, FLUID AND ELECTROLYTES - ADULT  Goal: Electrolytes maintained within normal limits  Description: INTERVENTIONS:  - Monitor labs and assess patient for signs and symptoms of electrolyte imbalances  - Administer electrolyte replacement as ordered  - Monitor response to electrolyte replacements, including repeat lab results as appropriate  - Instruct patient on fluid and nutrition as appropriate  Outcome: Progressing  Goal: Fluid balance maintained  Description: INTERVENTIONS:  - Monitor labs   - Monitor I/O and WT  - Instruct patient on fluid and nutrition as appropriate  - Assess for signs & symptoms of volume excess or deficit  Outcome: Progressing  Goal: Glucose maintained within target range  Description: INTERVENTIONS:  - Monitor Blood Glucose as ordered  - Assess for signs and symptoms of hyperglycemia and hypoglycemia  - Administer ordered medications to maintain glucose within target range  - Assess nutritional intake and initiate nutrition service referral as needed  Outcome: Progressing     Problem: SKIN/TISSUE INTEGRITY - ADULT  Goal: Skin integrity remains intact  Description: INTERVENTIONS  - Identify patients at risk for skin breakdown  - Assess and monitor skin integrity  - Assess and monitor nutrition and hydration status  - Monitor labs (i e  albumin)  - Assess for incontinence   - Turn and reposition patient  - Assist with mobility/ambulation  - Relieve pressure over bony prominences  - Avoid friction and shearing  - Provide appropriate hygiene as needed including keeping skin clean and dry  - Evaluate need for skin moisturizer/barrier cream  - Collaborate with interdisciplinary team (i e  Nutrition, Rehabilitation, etc )   - Patient/family teaching  Outcome: Progressing  Goal: Incision(s), wounds(s) or drain site(s) healing without S/S of infection  Description: INTERVENTIONS  - Assess and document risk factors for skin impairment   - Assess and document dressing, incision, wound bed, drain sites and surrounding tissue  - Consider nutrition services referral as needed  - Oral mucous membranes remain intact  - Provide patient/ family education  Outcome: Progressing  Goal: Oral mucous membranes remain intact  Description: INTERVENTIONS  - Assess oral mucosa and hygiene practices  - Implement preventative oral hygiene regimen  - Implement oral medicated treatments as ordered  - Initiate Nutrition services referral as needed  Outcome: Progressing     Problem: PAIN - ADULT  Goal: Verbalizes/displays adequate comfort level or baseline comfort level  Description: Interventions:  - Encourage patient to monitor pain and request assistance  - Assess pain using appropriate pain scale  - Administer analgesics based on type and severity of pain and evaluate response  - Implement non-pharmacological measures as appropriate and evaluate response  - Consider cultural and social influences on pain and pain management  - Notify physician/advanced practitioner if interventions unsuccessful or patient reports new pain  Outcome: Progressing     Problem: INFECTION - ADULT  Goal: Absence or prevention of progression during hospitalization  Description: INTERVENTIONS:  - Assess and monitor for signs and symptoms of infection  - Monitor lab/diagnostic results  - Monitor all insertion sites, i e  indwelling lines, tubes, and drains  - Monitor endotracheal if appropriate and nasal secretions for changes in amount and color  - [place] appropriate cooling/warming therapies per order  - Administer medications as ordered  - Instruct and encourage patient and family to use good hand hygiene technique  - Identify and instruct in appropriate isolation precautions for identified infection/condition  Outcome: Progressing     Problem: SAFETY ADULT  Goal: Patient will remain free of falls  Description: INTERVENTIONS:  - Assess patient frequently for physical needs  -  Identify cognitive and physical deficits and behaviors that affect risk of falls    -  [place] fall precautions as indicated by assessment   - Educate patient/family on patient safety including physical limitations  - Instruct patient to call for assistance with activity based on assessment  - Modify environment to reduce risk of injury  - Consider OT/PT consult to assist with strengthening/mobility  Outcome: Progressing  Goal: Maintain or return to baseline ADL function  Description: INTERVENTIONS:  -  Assess patient's ability to carry out ADLs; assess patient's baseline for ADL function and identify physical deficits which impact ability to perform ADLs (bathing, care of mouth/teeth, toileting, grooming, dressing, etc )  - Assess/evaluate cause of self-care deficits   - Assess range of motion  - Assess patient's mobility; develop plan if impaired  - Assess patient's need for assistive devices and provide as appropriate  - Encourage maximum independence but intervene and supervise when necessary  - Involve family in performance of ADLs  - Assess for home care needs following discharge   - Consider OT consult to assist with ADL evaluation and planning for discharge  - Provide patient education as appropriate  Outcome: Progressing  Goal: Maintain or return mobility status to optimal level  Description: INTERVENTIONS:  - Assess patient's baseline mobility status (ambulation, transfers, stairs, etc )    - Identify cognitive and physical deficits and behaviors that affect mobility  - Identify mobility aids required to assist with transfers and/or ambulation (gait belt, sit-to-stand, lift, walker, cane, etc )  - [place] fall precautions as indicated by assessment  - Record patient progress and toleration of activity level on Mobility SBAR; progress patient to next Phase/Stage  - Instruct patient to call for assistance with activity based on assessment  - Consider rehabilitation consult to assist with strengthening/weightbearing, etc   Outcome: Progressing     Problem: DISCHARGE PLANNING  Goal: Discharge to home or other facility with appropriate resources  Description: INTERVENTIONS:  - Identify barriers to discharge w/patient and caregiver  - Arrange for needed discharge resources and transportation as appropriate  - Identify discharge learning needs (meds, wound care, etc )  - Arrange for interpretive services to assist at discharge as needed  - Refer to Case Management Department for coordinating discharge planning if the patient needs post-hospital services based on physician/advanced practitioner order or complex needs related to functional status, cognitive ability, or social support system  Outcome: Progressing

## 2021-01-22 NOTE — NURSING NOTE
During report we had entered her room and she was not able to really wake up for us so I revisited later  She was very tired and seemed confused at 0800 when I had gone in to ask her to state her name and birthday she told me "No " I came back in later to attempt to give her medications and she told me to get away from her  Twenty minutes later he daughter called the phone in her room  When I gave it to her she said few words to her grandaughter  After that call her grandaughter called me to state that her grandmother never acts like that and seemed very off  I addressed this in stand-up and then had also spoken to her   I told them that the doctor had ordered a UA to see if that was the cause of her change in behavior  At 1000 occupational therapy was working with her and she was more with it, was very pleasant, told me her birthday, the month, and was able to have conversations

## 2021-01-22 NOTE — PROGRESS NOTES
01/22/21 1300   Patient Data   Rehab Impairment Impairment of mobility, safety and Activities of Daily Living (ADLs) due to Cardiac:  Cardiac     Etiologic Diagnosis Afib with RVR   Date of Onset 01/07/20   Support System   Name Patient lives at home with her   She also reports a local child, grandchildren and great grandchildren   Able to provide 24 hour supervision Yes   Able to provide physical help?   (limited)   Home Setup   Type of Home Single Level  ([place])   Method of Entry [employer]   Number of Stairs 2   First Floor Bathroom Grab Bars  (reprots both walk in and step over tub)   First Floor Bathroom Accessibility Grab bars in tub/shower;Built in [facility] [address];Single [address] [place] [address] Blvd; Wheelchair;Bedside Commode   Baseline Information   Vocation   (Retired)   Transportation    Prior IADL Participation   Money Management Identify Money;Estimate Costs;Estimate Change;Combine Bills;Manage Checkbook   Meal Preparation Full Participation   Laundry Full Participation   Home Cleaning Full Participation   Prior Level of Function   Self-Care 3  Independent - Patient completed the activities by him/herself, with or without an assistive device, with no assistance from a helper  ( also completes)   Indoor-Mobility (Ambulation) 3  Independent - Patient completed the activities by him/herself, with or without an assistive device, with no assistance from a helper  ( also completes)   Stairs 3  Independent - Patient completed the activities by him/herself, with or without an assistive device, with no assistance from a helper  ( also completes)   Functional Cognition 3  Independent - Patient completed the activities by him/herself, with or without an assistive device, with no assistance from a helper  ( also completes)   Prior Device Used Z   None of the above   Falls in the Last Year   Number of falls in the past 12 months 0   Psychosocial   Psychosocial (WDL) WDL   Restrictions/Precautions   Precautions Aspiration;Bed/chair alarms;Cognitive; Fall Risk;Hard of hearing;Pacemaker;Supervision on toilet/commode   Weight Bearing Restrictions No   ROM Restrictions Yes   LUE ROM Restriction Range Limitation  (Pacemaker Precautions)   Pain Assessment   Pain Assessment Tool Pain Assessment not indicated - pt denies pain   Pain Score No Pain   Eating Assessment   Type of Assistance Needed Supervision   Amount of Physical Assistance Provided No physical assistance   Eating CARE Score 4   Oral Hygiene   Type of Assistance Needed Supervision   Amount of Physical Assistance Provided No physical assistance   Comment seated at sink in w/c due to poor standing tolerance   Oral Hygiene CARE Score 4   Tub/Shower Transfer   Reason Not Assessed   (No shower orders at this time)   Shower/Bathe Self   Type of Assistance Needed Physical assistance   Amount of Physical Assistance Provided Total assistance   Comment Max A of 1 to stand, 2nd person to complete laura and buttock hygiene  A for BLE and feet and RUE due to pain with LUE   Shower/Bathe Self CARE Score 1   Bathing   Assessed Bath Style Sponge Bath   Anticipated D/C Bath Style Sponge Bath; Shower   Limitations Noted in Balance; Coordination; Endurance;Problem Solving; Safety; Sequencing;ROM   Dressing/Undressing Clothing   Type of Assistance Needed Physical assistance   Amount of Physical Assistance Provided 50%-74%   Comment due to L shoulder ROM limitations   Upper Body Dressing CARE Score 2   Type of Assistance Needed Physical assistance   Amount of Physical Assistance Provided Total assistance   Comment Ax2, 1st person sit to stand, 2nd person for CM  A to thread, but will benefit from [facility] education   Lower Body Dressing CARE Score 1   Limitations Noted In Balance; Coordination; Endurance;Problem Solving; Safety; Sequencing;Strength;ROM   Positioning Sit Edge Of Bed   Putting On/Taking Off Footwear   Type of Assistance Needed Physical assistance   Amount of Physical Assistance Provided Total assistance   Putting On/Taking Off Footwear CARE Score 1   Toileting Hygiene   Type of Assistance Needed Physical assistance   Amount of Physical Assistance Provided Total assistance   Comment Ax2, therapist in front Max A for stance, 2nd person complete hygiene   Toileting Hygiene CARE Score 1   Toilet Transfer   Surface Assessed Drop Arm Commode   Transfer Technique Sit Pivot   Limitations Noted In Balance;Confidence; Endurance;Problem Solving;ROM;Safety;UE Strength;LE Strength; Sequencing   Positioning Concerns Safety;Cognition   Type of Assistance Needed Physical assistance   Amount of Physical Assistance Provided Total assistance   Comment Max A of 1, Min A of 2nd sit pivot   Toilet Transfer CARE Score 1   Transfer Bed/Chair/Wheelchair   Positioning Concerns Cognition   Limitations Noted In Balance;Confidence; Coordination; Endurance;Pain Management;Problem Solving;UE Strength;LE Strength; Sequencing   Type of Assistance Needed Physical assistance   Amount of Physical Assistance Provided Total assistance   Comment Sit pivot Ax2 1 person in front, 2nd in back  poor UE WB, difficulty sequencing, safety and cognition    Chair/Bed-to-Chair Transfer CARE Score 1   Lying to Sitting on Side of Bed   Type of Assistance Needed Physical assistance   Amount of Physical Assistance Provided 75% or more   Lying to Sitting on Side of Bed CARE Score 2   Sit to Stand   Type of Assistance Needed Physical assistance   Amount of Physical Assistance Provided Total assistance   Comment Mod-Max A of 1, Min A of 2nd  poor fatigue   Sit to Stand CARE Score 1   Comprehension   QI: Comprehension 3  Usually Understands: Understands most conversations, but misses some part/intent of message  Requires cues at times to understand  Expression   QI: Expression 3   Exhibits some difficulty with expressing needs and ideas (e g , some words or finishing thoughts) or speech is not clear   Coordination   Coordination and Movement Description bradykinetic   Sensation   Light Touch No apparent deficits   Sharp/Dull No apparent deficits   Stereognosis No apparent deficits   Cognition   Overall Cognitive Status Impaired   Arousal/Participation Cooperative   Attention Attends with cues to redirect   Orientation Level Oriented to person;Oriented to place; Disoriented to time;Disoriented to situation   Memory Decreased short term memory;Decreased recall of recent events;Decreased recall of precautions   Following Commands Follows one step commands with increased time or repetition   Comments increased time for processing, repetition   Discharge Information   Vocational Plan Retired/not working   Patient's Discharge Plan return home with assist of family    Patient's Rehab Expectations "Improve L arm pain" get stronger and walk   Barriers to Discharge Home Limited Family Support; Unsafe Home Setup; Decreased Cognitive Function;Decreased Strength;Decreased Endurance;Pain; Safety Considerations   Impressions Pt is a 79year old female who was seen for an OT evaluation following admission to [facility] due chest pain and elevated troponins  She underwent cardiac cath and stent placement but developed a-fib and tachy-jillian syndrome  Pace maker placed on 1/19/21  She presents with an additional problem list which includes HTN, CHF, dysphagia, pneumonia  CKD, and Sjogren's disease  Pt's comorbidities include: acute on chronic CHF, renal artery stenosis, RLL pneumonia, acute hypoxic respiratory failure, urinary retention, CAD s/p CABG, HLD, GERD, HTN, 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, dysphagia  Pt's precuations include: Pacemaker precautions  Prior to admission pt was completing ADL's independent without use of AD   Currently, pt requires Total assist for overall ADLs, and requires Total Assist for transfers using no AD at this time (will assess in future session)  Pt is currently limited due to the following deficits impacting occupational performance, activity tolerance, endurance, standing balance/tolerance and sitting balance/tolerance  The patient has shown a decline from prior level of function  The patient participates in identification of personal goals to address in Occupational Therapy  Pt benefits from skilled OT services for I/ADL retraining to support the ability to safely participate in daily occupations safely and independently in the most least restrictive way  From OT standpoint, Pt demonstrates Good rehab potential to meet LTG's  Pt is a good rehab candidate, Anticipate 3 week length of stay to meet Supervision goals  Occupational Therapy plan of care to focus on the following areas:  ADL re-training, [address] training, IADL re-training, functional transfers, standing tolerance, ROM, standing balance, DME training/education, family training/education, and EC techniques/education     OT Therapy Minutes   OT Time In 1300   OT Time Out 1440   OT Total Time (minutes) 100   OT Mode of treatment - Individual (minutes) 100   OT Mode of treatment - Concurrent (minutes) 0   OT Mode of treatment - Group (minutes) 0   OT Mode of treatment - Co-treat (minutes) 0   OT Mode of Treatment - Total time(minutes) 100 minutes   OT Cumulative Minutes 100   Cumulative Minutes   Cumulative therapy minutes 190

## 2021-01-22 NOTE — ASSESSMENT & PLAN NOTE
- s/p pacer on 1/19  - FU for device and incision check scheduled for 2/1  - OP FU with Dr Elizabeth Desai and Zohreh Patel (scheduled for 2/18)

## 2021-01-22 NOTE — PROGRESS NOTES
01/22/21 1300   Rehab Team Goals   ADL Team Goal Patient will require supervision with ADLs with least restrictive device upon completion of rehab program   Rehab Team Interventions   OT Interventions Self Care;Home Management; Therapeutic Exercise;Community Reintegration;Cognitive Reintegration;Cognitive Retraining;Energy Conservation;Patient/Family Education   Eating Goal   Eating Goal 06  Independent - Patient completes the activity by him/herself with no assistance from a helper  Status Ongoing; Target goal - three weeks   Interventions Optimal Position   Grooming Goal   Oral Hygiene Goal 04  Supervision or touching assistance- [place] provides VERBAL CUES or supervision throughout activity  Task Wash/Dry Face;Wash/Dry Hands;Brush Teeth   Environment Stand at [employer]   Status Ongoing; Target goal - three weeks   Intervention Assistive Device;Balance Work;Neuromuscular Education; Therapeutic Exercise; Tolerance Work   Tub/Shower Transfer Goal   Method Shower Stall  (S)   Assist Device Seat with Back   Status Ongoing; Target goal - three weeks   Interventions ADL Training;Assistive Device; Neuromuscular Education   Bathing Goal   Shower/bathe self Goal 04  Supervision or touching assistance- [place] provides VERBAL CUES or supervision throughout activity  Environment Seated;Standing   Status Ongoing; Target goal - three weeks   Intervention ADL Training;Assistive Device; Neuromuscular Education   Upper Body Dressing Goal   Upper body dressing Goal 04  Supervision or touching assistance- [place] provides VERBAL CUES or supervision throughout activity  Task Upper Body   Status Ongoing; Target goal - three weeks   Intervention Assistive Device;Balance Work;Neuromuscular Education; Therapeutic Exercise; Tolerance Work   Lower Body Dressing Goal   Lower body dressing Goal 04  Supervision or touching assistance- [place] provides VERBAL CUES or supervision throughout activity  Putting on/taking off footwear Goal 04  Supervision or touching assistance- [place] provides VERBAL CUES or supervision throughout activity  Environment Seated;Standing   Status Ongoing; Target goal - three weeks   Intervention Assistive Device;Balance Work;Neuromuscular Education; Therapeutic Exercise; Tolerance Work   Toileting Transfer Goal   Toilet transfer Goal 04  Supervision or touching assistance- [place] provides VERBAL CUES or supervision throughout activity  Status Ongoing; Target goal - three weeks   Intervention ADL Training;Balance Work;Assistive Device   Toileting Goal   Toileting hygiene Goal 04  Supervision or touching assistance- [place] provides VERBAL CUES or supervision throughout activity  Task Pants Up;Pants Down;Hygiene   Status Ongoing; Target goal - three weeks

## 2021-01-22 NOTE — TREATMENT PLAN
Individualized Plan of Lebron Wise [address] 79 y o  female MRN: [id number]  Unit/Bed#: -75 Encounter: [id number]     PATIENT INFORMATION  ADMISSION DATE: 1/21/2021  6:37 PM AGGIE CATEGORY: debility    ADMISSION DIAGNOSIS: Atrial fibrillation ([facility] [address] ) [I48 [id number]]  EXPECTED LOS: 1-2 wks      MEDICAL/FUNCTIONAL PROGNOSIS  Based on my assessment of the patient's medical conditions and current functional status, the prognosis for attaining medical and functional goals or the IRF stay is:  Fair     Medical Goals: risk factor modification     ANTICIPATED DISCHARGE DISPOSITION AND SERVICES  COMMUNITY SETTING: home     ANTICIPATED FOLLOW-UP SERVICE:   Outpatient Therapy Services: PT/OT/ST     DISCIPLINE SPECIFIC PLANS:  Required Disciplines & Services: PT/OT/ST     REQUIRED THERAPY:  Therapy Hours per Day Days per Week Total Days   Physical Therapy 1 5 10   Occupational Therapy 1 5 10   Speech/Language Therapy 1 5 10   NOTE: Additional therapy time(s) may be added as appropriate to meet patient needs and to achieve functional goals          ANTICIPATED FUNCTIONAL OUTCOMES:  ADL:   Patient will maximize level for ADLs with least restrictive device upon completion of the rehab program     Bladder/Bowel: Patient will maximize level for bladder/bowel management with least restrictive device upon completion of the rehab program     Transfers:   Patient will maximize level for transfers with least restrictive device upon completion of the rehab program     Locomotion:   Patient will maximize level for locomotion with least restrictive device upon completion of the rehab program     Cognitive:   Patient appears to be at baseline          [address] [facility] needs: tbd       REHAB ANTICIPATED PARTICIPATION RESTRICTIONS:  None

## 2021-01-22 NOTE — ASSESSMENT & PLAN NOTE
- AST now WNL   - mildly elevated total bilirubin of 1 12 (ULN 1 00) with Alk phos WNL   - GI following

## 2021-01-22 NOTE — ASSESSMENT & PLAN NOTE
- grade 1 DD  - EF 45%  - at home on torsemide 20 mg qd however per patient she takes a 1/2 tab daily however ultimately it appears this was switched to lasix 40 mg qd after prior hospitalization  - chest XR of 1/20 and CT of 1/26 revealed small B/L pleural effusions in acute care (however patient also had PNA in acute care and is s/p abx)  - daily weights, I/O   - IM managing while in rehab unit   - OP FU with Dr Lonny Gandhi and Shell Cheung (which is scheduled for 2/18)

## 2021-01-22 NOTE — ASSESSMENT & PLAN NOTE
- of note patient was temporarily put on clear liquid diet by GI due to N/V which has resolved and therefore d/w GI and they have cleared patient to resume prior diet    - mild at level 3 diet  - continue speech therapy

## 2021-01-22 NOTE — H&P
PHYSICAL MEDICINE AND REHABILITATION H&P/ADMISSION NOTE  Jamie Huerta 79 y o  female MRN: [id number]  Unit/Bed#: -50 Encounter: [id number]     Rehab Diagnosis: 09 (cardiac)     CC: cardiac debility     History of Present Illness:   Jamie Huerta is a 79 y o  female who presented to the [facility] with chest pain and elevated troponins and underwent cardiac cath and stent placement however later developed new onset a-fib and tachy-jillian syndrome requiring pacer placement on 1/19  Course complicated by PNA which was treated with abx and colitis  Subjective: d/w patient rehab program     Review of Systems: A 10-point review of systems was performed  Negative except as listed above      Plan:     A-fib [facility])  Assessment & Plan  - new onset in acute care   - at home on toprol XL (24 hr) 25 mg qd however was increased to 50 mg after prior hospitalization (IM managing BB)   - started on xarelto 15 mg qdinner in acute care per EP recommendations (CrCl borderline for 20 mg qdinner dose at 50 5 therefore EP elected for lower dose)   - OP FU with Dr Ted Black and Zack Amaro (scheduled for 2/18)     CAD (coronary artery disease)  Assessment & Plan  - h/o CABG  - elevated troponins in acute care and patient is s/p LAMINE on 1/11/21  - at home was on zocor 40 mg qpm however was switched to lipitor 20 mg qpm after prior hospitalization   - home asa stopped in acute care per EP recommendations    - continued on home effient 10 mg qd per EP recommendations  - home ranexa increased from 500 mg q12 to 1000 mg q12 after prior hospitalization    - at home on toprol XL (24 hr) 25 mg qd however increased to 50 mg after prior hospitalization (IM managing BB dose while in rehab unit)   - OP FU with Dr Ted Black and Lupe Dominguez (scheduled for 2/18)           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[facility] [address] )  Assessment & Plan  - grade 1 DD  - EF 45%  - at home on torsemide 20 mg qd however per patient she takes a 1/2 tab daily however ultimately it appears this was switched to lasix 40 mg qd after prior hospitalization  - chest XR of 1/20 revealed small B/L pleural effusions in acute care (however patient also had PNA in acute care and is s/p abx)  - daily weights, I/O   - IM managing while in rehab unit   - OP FU with Dr Darwyn Canavan and Yani Olson (which is scheduled for 2/18)             * 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[facility])  Assessment & Plan  - s/p pacer on 1/19  - FU for device and incision check scheduled for 2/1  - OP FU with Dr Darwyn Canavan and rCistobal Gordon (scheduled for 2/18)     CKD (chronic kidney disease)  Assessment & Plan  - Cr currently 0 76  - baseline Cr appears to be 0 8-1 0  - IM monitoring     Anemia  Assessment & Plan  - Hg currently stable at 11 0  - IM monitoring     Hyponatremia  Assessment & Plan  - mild at 135 and can be considered low normal     Elevated AST (SGOT)  Assessment & Plan  - mildly elevated at 68 (ULN 45) and trending down  - IM monitoring     Dysphagia  Assessment & Plan  - mild at level 3 diet  - will have ST evaluate     PAD (peripheral artery disease) ([facility] [address] )  Assessment & Plan  - diffuse PAD with B/L LE occlusive disease  - chronic occlusion of Rt subclavian artery  - stenosis of L subclavian artery, L carotid artery, celiac artery, B/L renal arteries, SMA, JETHRO, L ALBERTO  - home asa stopped in acute care per EP recommendations  - home effient 10 mg qd continued per EP recommendations   - was on zocor 40 mg qpm previously however switched to lipitor 20 mg qpm after last hospitalization   - OP FU with Dr Lazar Heads hypertension  Assessment & Plan  - at home on regimen of norvsasc 5 mg qd however recently increased to 10 mg after prior hospitalization, imdur (24 hr) 60 mg qd however increased to 90 mg after prior hospitalization, toprol XL (24 hr) 25 mg qd however increased to 50 mg after prior hospitalization, and cozaar 50 mg qd (patient also appears to have been on torsemide 20 mg qd however patient states she takes a 1/2 tab qd and ultimately this appears to have been switched to lasix 40 mg after prior hospitalization)   - IM managing      Drug regimen reviewed, all potential adverse effects identified and addressed:    Scheduled Meds:  Current Facility-Administered Medications   Medication Dose Route Frequency Provider Last Rate    [START ON 1/22/2021] atorvastatin  20 mg Oral Daily With MD Tracy Naqvi  ON 1/22/2021] furosemide  40 mg Oral Daily nAdre Menjivar MD      loperamide  2 mg Oral TID PRN MD Jay Grant ON 1/22/2021] losartan  25 mg Oral Daily MD Jay Grant ON 1/22/2021] metoprolol succinate  50 mg Oral Daily Andre Menjivar MD      oxyCODONE  2 5 mg Oral Q4H PRN MD Jay Grant ON 1/22/2021] prasugrel  10 mg Oral Daily Andre Menjivar MD      ranolazine  1,000 mg Oral Q12H Andre Menjivar MD      rivaroxaban  15 mg Oral Daily With Carol Ann Badillo MD              Incidental findings:    1) abnormalities on echocardiogram while in acute care including but not limited to elevated peak PA pressure: OP FU with Dr Kadeem Hooker and Becky CHAVEZ    2) abnormalities on EKG while in acute care: in the setting of paced rhythm and patient was evaluated by cards with no further inpt PAULSON/intervention recommended; OP FU with Dr Kadeem Hooker and Becky CHAVEZ    3) low lipase level on 1/12: unclear clinic significance; OP FU with PCP with further testing/treatment and/or specialist referral at PCP's discretion     4) small hiatal hernia: asymptomatic; OP FU with PCP with further testing/treatment and/or specialist referral at PCP's discretion     5) gallstones w/distended gallbladder (no pericholecystatic inflammatory findings per imaging report): asymptomatic; OP FU with PCP with further testing/treatment and/or specialist referral at PCP's discretion     6) non-obstructive 1 mm L kidney stone: asymptomatic; OP FU with PCP with further testing/treatment and/or specialist referral at PCP's discretion     DVT ppx: xarelto            Functional History - Prior to Admission:    I PTA      Functional Status Upon Admission to [facility]:  Transfers: mod   Mobility: mod   ADLs: mod      Physical Exam:  Vitals:    01/21/21 1840   BP: 125/57   Pulse: 75   Resp: 16   Temp: 98 2 °F    SpO2: 96%         General: alert, no apparent distress, cooperative and comfortable  HEENT:  Head: Normal, normocephalic, atraumatic    Eye: Normal external eye, conjunctiva, lidsc cornea  Ears: Normal external ears  Nose: Normal external nose, mucus membranes  CARDIAC:  +S1/2  LUNGS:  no abnormal respiratory pattern, no retractions noted, non-labored breathing   ABDOMEN:  soft NT   EXTREMITIES:  no cyanosis   NEURO:  CN 2-12 grossly intact, lt touch grossly intact, no gross dysmetria on F-N testing B/L, 4/5 throughout on MMT   PSYCH:  mood/affect currently stable       Laboratory:    Results from last 7 days   Lab Units 01/19/21  0546 01/18/21  0823 01/17/21  0520   HEMOGLOBIN g/dL 11 0* 10 8* 11 3*   HEMATOCRIT % 35 8 33 4* 34 8   WBC Thousand/uL 7 36 10 07 8 72     Results from last 7 days   Lab Units 01/19/21  0549 01/18/21  0822 01/17/21  0520   BUN mg/dL 11 12 14   SODIUM mmol/L 135* 136 137   POTASSIUM mmol/L 3 7 4 1 3 7   CHLORIDE mmol/L 105 106 102   CREATININE mg/dL 0 76 0 71 0 99   AST U/L  --   --  68*   ALT U/L  --   --  48                Past Medical History:   Past Surgical History:   Family History:   Social history:   Past Medical History:   Diagnosis Date    Anal fissure     Cardiac disorder     Cognitive changes 12/23/2020    Esophageal reflux     Esophagitis, reflux     Hemorrhoids     Hepatic hemangioma     Last Assessed: 1/13/2015    Herpes zoster     History of colonic polyps     Hypertension     Ischemic colitis ([facility] [address] )     Lumbar herniated disc     Malignant neoplasm without specification of site ([facility] [address] )  Nephrolithiasis     L  Lithotripsy    Nontoxic single thyroid nodule     Last Assessed: 1/13/2015    Osteoarthritis     Overactive bladder     Raynaud disease     Respiratory system disease     Sjogren's disease ([address] )     Spinal stenosis     PONCHO (stress urinary incontinence, female)     Uterovaginal prolapse     Grade I-II    Past Surgical History:   Procedure Laterality Date    APPENDECTOMY  1947    CARDIAC SURGERY      CABG    CATARACT EXTRACTION Bilateral     COLONOSCOPY  2012    Fiberoptic    COLONOSCOPY      Resolved: 2006 - 2012 5 year f/u    CORONARY ANGIOPLASTY WITH STENT PLACEMENT      CORONARY ARTERY BYPASS GRAFT      Resolved: 2012    ESOPHAGOGASTRODUODENOSCOPY  2012    Diagnostic    HEMORROIDECTOMY      KNEE SURGERY      LITHOTRIPSY      Renal    MALIGNANT SKIN LESION EXCISION      Face; Resolved: 2004    KS ESOPHAGOGASTRODUODENOSCOPY TRANSORAL DIAGNOSTIC N/A 4/13/2016    Procedure: EGD AND COLONOSCOPY;  Surgeon: Azam Todd MD;  [facility];   Service: Gastroenterology    RENAL ARTERY STENT      SKIN LESION EXCISION      Scalp    SOFT TISSUE TUMOR RESECTION      Shoulder; Resolved: 1995    THROMBOLYSIS      Postoperative Thrombolysis PTCA    TONSILLECTOMY      Resolved: 1944     Family History   Problem Relation Age of Onset    Heart disease Mother     Hypertension Mother     Osteoporosis Mother     Heart disease Father     Hypertension Father     Ulcerative colitis Family     Colon polyps Family       Social History     Socioeconomic History    Marital status: /Civil Union     Spouse name: Not on file    Number of children: Not on file    Years of education: Not on file    Highest education level: Not on file   Occupational History    Occupation: retired   Social Needs    Financial resource strain: Not on file    Food insecurity     Worry: Not on file     Inability: Not on file   [employer] needs     Medical: Not on file Non-medical: Not on file   Tobacco Use    Smoking status: Never Smoker    Smokeless tobacco: Never Used   Substance and Sexual Activity    Alcohol use: Not Currently     Frequency: Monthly or less     Comment: social    Drug use: No    Sexual activity: Not Currently   Lifestyle    Physical activity     Days per week: Not on file     Minutes per session: Not on file    Stress: Not on file   Relationships    Social connections     Talks on phone: Not on file     Gets together: Not on file     Attends Restorationism service: Not on file     Active member of club or organization: Not on file     Attends meetings of clubs or organizations: Not on file     Relationship status: Not on file    Intimate partner violence     Fear of current or ex partner: Not on file     Emotionally abused: Not on file     Physically abused: Not on file     Forced sexual activity: Not on file   Other Topics Concern    Not on file   Social History Narrative    Activities: golf    Caffeine use    Consumes healthy diet    Daily caffeinated coffee consumption: 1 cup per day    Drinks caffeinated tea: 1 cup per day    Well balanced diet          Current Medical Diagnosis Allergies   Patient Active Problem List   Diagnosis    Essential hypertension    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    CAD (coronary artery disease)    [facility]fib [facility]    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[facility] [address] )    PAD (peripheral artery disease) [facility]    Dysphagia    Elevated AST (SGOT)    Hyponatremia    Anemia    CKD (chronic kidney disease)    Allergies   Allergen Reactions    Sulfa Antibiotics Anaphylaxis    Formaldehyde     Codeine Palpitations           Medical Necessity Criteria for [facility] Admission: anemia, HTN  In addition, the preadmission screen, post-admission physical evaluation, overall plan of care and admissions order demonstrate a reasonable expectation that the following criteria were met at the time of admission to the [place] [address]  The patient requires active and ongoing therapeutic intervention of multiple therapy disciplines (physical therapy, occupational therapy, speech-language pathology, or prosthetics/orthotics), one of which is physical or occupational therapy  2  Patient requires an intensive rehabilitation therapy program, as defined in Chapter 1, section 110 2 2 of the CMS Medicare Policy Manual  This intensive rehabilitation therapy program will consist of at least 3 hours of therapy per day at least 5 days per week or at least 15 hours of intensive rehabilitation therapy within a 7 consecutive day period, beginning with the date of admission to the [facility]  3  The patient is reasonably expected to actively participate in, and benefit significantly from, the intensive rehabilitation therapy program as defined in Chapter 1, section 110 2 2 of the CMS Medicare Policy Manual at this time of admission to the [facility]  She can reasonably be expected to make measurable improvement (that will be of practical value to improve the patients functional capacity or adaptation to impairments) as a result of the rehabilitation treatment, as defined in section 110 3, and such improvement can be expected to be made within the prescribed period of time  As noted in the CMS Medicare Policy Manual, the patient need not be expected to achieve complete independence in the domain of self-care nor be expected to return to his or her prior level of functioning in order to meet this standard  4  The patient must require physician supervision by a rehabilitation physician  As such, a rehabilitation physician will conduct face-to-face visits with the patient at least 3 days per week throughout the patients stay in the [facility] to assess the patient both medically and functionally, as well as to modify the course of treatment as needed to maximize the patients capacity to benefit from the rehabilitation process    5  The patient requires an intensive and coordinated interdisciplinary approach to providing rehabilitation, as defined in Chapter 1, section 110 2 5 of the CMS Medicare Policy Manual  This will be achieved through periodic team conferences, conducted at least once in a 7-day period, and comprising of an interdisciplinary team of medical professionals consisting of: a rehabilitation physician, registered nurse,  and/or , and a licensed/certified therapist from each therapy discipline involved in treating the patient  Changes Since Pre-admission Assessment: None -This patient's participation in rehab continues to be reasonable, necessary and appropriate  CMS Required Post-Admission Physician Evaluation Elements  History and Physical, including medical history, functional history and active comorbidities as in above text  PostAdmission Physician Evaluation:  The patient has the potential to make improvement and is in need of physical, occupational, and/or therapy services  The patient may also need nutritional services  Given the patient's complex medical condition and risk of further medical complications, rehabilitative services cannot be safely provided at a lower level of care, such as a skilled nursing facility  I have reviewed the patient's functional and medical status at the time of the preadmission screening and they are the same as on the day of this admission  I acknowledge that I have personally performed a full physical examination on this patient within 24 hours of admission  The patient and/or family demonstrated understanding the rehabilitation program and the discharge process after we discussed them       Agree in entirety: yes  Minor adaptions: none    Major changes: none     Arya Tapia MD  Physical Medicine and Rehabilitation

## 2021-01-22 NOTE — ASSESSMENT & PLAN NOTE
- at home on regimen of norvsasc 5 mg qd however recently increased to 10 mg after prior hospitalization, imdur (24 hr) 60 mg qd however increased to 90 mg after prior hospitalization, toprol XL (24 hr) 25 mg qd however increased to 50 mg after prior hospitalization, and cozaar 50 mg qd (patient also appears to have been on torsemide 20 mg qd however patient states she takes a 1/2 tab qd and ultimately this appears to have been switched to lasix 40 mg after prior hospitalization)   - IM managing

## 2021-01-22 NOTE — ASSESSMENT & PLAN NOTE
- diffuse PAD with B/L LE occlusive disease  - chronic occlusion of Rt subclavian artery  - stenosis of L subclavian artery, L carotid artery, celiac artery, B/L renal arteries, SMA, JETHRO, L ALBERTO  - home asa stopped in acute care per EP recommendations  - home effient 10 mg qd continued per EP recommendations   - was on zocor 40 mg qpm previously however switched to lipitor 20 mg qpm after last hospitalization   - OP FU with Dr Yusef Dumas

## 2021-01-22 NOTE — CONSULTS
Internal Medicine  Consultation Note    Patient: Shannan Archibald  Age/sex: 79 y o  female  Medical Record #: [id number]    Assessment/Plan:     Acute/chronic systolic/diastolic CHF, LVEF 74%/HH 1 DD/moderate pulm HTN and mild-mod MR   Acute CHF was felt to be 2/2 falsh pulm edema from coronary ischemia   Continue Lasix 40mg qd/Cozaar 25mg qd/Toprol but since still has LE edema up to lateral thighs may need to increase her Lasix   BMP in AM    NSTEMI with hx CAD/CABG; PTCA/LAMINE p LAD and ostial LMCA   Grafts to D1 and OM1 were 100% occluded and not amenable to intervention   Continue Ranexa/Effient/Lipitor and Toprol    New onset PAF   New to Xarelto (price check = $100 00/mo and family OK with this)   Continue Xarelto, Toprol    Significant conversion pause; s/p dual chamber PPM with left poseterior fascicle lead 1/19/21   Continue pacer precautions   Watch incision = currently dressing still on; remove 1/26    Enteritis/colitis   Will watch for diarrhea and return of abdominal pain    Aspiration PNA   S/p antibiotic   Resolved    Dysphagia   Continue Dysphagia level 3/thins   ST to see    Confusion  · Per PMR, getting UA/CX    Urine retention  · Began to have issues with high residuals last night  · Plan per PMR is to get urinalysis with reflex to cx    LUE edema  · Per PMR      Subjective/HPI:     Shannan Archibald is a 79year old patient with a history of CAD/CABG, HTN, HLD, bilateral SUKH who was admitted to [address] [place] [address] with NSTEMI and CHF  ECHO done showed LVEF 45%/grade 1 DD/moderate PHTN/mild-moderate MR  She was given IV Lasix  On 1/11/21, she underwent a cardiac catheterization with the following findings: ostial LM 75% (culprit lesion), proximal LAD 95%, OM1 100%, mid % (chronic), graft to D1 100% proximal (not amenable to intervention), graft to OM1 100% proximal (not amenable to intervention), graft to distal RCA 50%  She then had PTCA/LAMINE to proximal LAD and ostial LM    She was placed on ASA and Effient  She developed pneumonia which was felt to be 2/2 aspiration/vomiting and received full course antibiotic  She was felt to have some dysphagia and was placed on a Dysphagia diet, Level 3 with thin liquids  She had abdominal pain and CT revealed enterits/colitis which resolved  She was negative for cdiff  In addition to the above, patient went into new onset rapid atrial fibrillation  She was given Amiodarone, metoprolol and Digoxin  She did convert to NSR but had a lengthy pause and was then transferred to [facility] were she received a dual chamber PPM   The pacer was done with a left posterior fascicle lead to preserve narrow QRS  This was done 1/19/21  Due to the fact that she needed to start full anticoagulation for the atrial fibrillation, cardiology stopped her ASA when she was placed on Xarelto  She remains on Effient  Cost of the Effient was evaluated and found that the cost to her will be $100 00/month  Currently there are no complaints of CP, SOB, dizziness, N/V/D  She was confused and yelling during the night and was not cooperative with nursing this AM  Last night, she had high PVRs and needed to be straight cath'd  ROS:   A 10 point ROS was performed; negative except as noted above  Medical History:  Past Medical History:   Diagnosis Date    Anal fissure     Cardiac disorder     Cognitive changes 12/23/2020    Esophageal reflux     Esophagitis, reflux     Hemorrhoids     Hepatic hemangioma     Last Assessed: 1/13/2015    Herpes zoster     History of colonic polyps     Hypertension     Ischemic colitis ([facility] [address] [facility]     Lumbar herniated disc     Malignant neoplasm without specification of site ([address] )     Nephrolithiasis     L   Lithotripsy    Nontoxic single thyroid nodule     Last Assessed: 1/13/2015    Osteoarthritis     Overactive bladder     Raynaud disease     Respiratory system disease     Sjogren's disease ([address] )     Spinal stenosis     PONCHO (stress urinary incontinence, female)     Uterovaginal prolapse     Grade I-II     Past Surgical History:   Procedure Laterality Date    APPENDECTOMY  1947    CARDIAC SURGERY      CABG    CATARACT EXTRACTION Bilateral     COLONOSCOPY  2012    Fiberoptic    COLONOSCOPY      Resolved: 2006 - 2012 5 year f/u    CORONARY ANGIOPLASTY WITH STENT PLACEMENT      CORONARY ARTERY BYPASS GRAFT      Resolved: 2012    ESOPHAGOGASTRODUODENOSCOPY  2012    Diagnostic    HEMORROIDECTOMY      KNEE SURGERY      LITHOTRIPSY      Renal    MALIGNANT SKIN LESION EXCISION      Face; Resolved: 2004    IA ESOPHAGOGASTRODUODENOSCOPY TRANSORAL DIAGNOSTIC N/A 4/13/2016    Procedure: EGD AND COLONOSCOPY;  Surgeon: Jena Jackson MD;  [facility];   Service: Gastroenterology    RENAL ARTERY STENT      SKIN LESION EXCISION      Scalp    SOFT TISSUE TUMOR RESECTION      Shoulder; Resolved: 1995    THROMBOLYSIS      Postoperative Thrombolysis PTCA    TONSILLECTOMY      Resolved: 1944       Substance Use History:   Social History     Substance and Sexual Activity   Alcohol Use Not Currently    Frequency: Monthly or less    Comment: social     Social History     Tobacco Use   Smoking Status Never Smoker   Smokeless Tobacco Never Used     Social History     Substance and Sexual Activity   Drug Use No       Family History:    Family History   Problem Relation Age of Onset    Heart disease Mother     Hypertension Mother     Osteoporosis Mother     Heart disease Father     Hypertension Father     Ulcerative colitis Family     Colon polyps Family        Review of Scheduled Meds:  Current Facility-Administered Medications   Medication Dose Route Frequency Provider Last Rate    atorvastatin  20 mg Oral Daily With Jenni Guillaume MD      furosemide  40 mg Oral Daily Alice De Los Santos MD      loperamide  2 mg Oral TID PRN Alice De Los Santos MD      losartan  25 mg Oral Daily MD Nuvia Melton metoprolol succinate  50 mg Oral Daily Yousif Rich MD      oxyCODONE  2 5 mg Oral Q4H PRN Yousif Rich MD      prasugrel  10 mg Oral Daily Yousif Rich MD      ranolazine  1,000 mg Oral Q12H Yousif Rich MD      rivaroxaban  15 mg Oral Daily With Rubens Stoddard MD         Labs:     Results from last 7 days   Lab Units 21  0546 21  0823   WBC Thousand/uL 7 36 10 07   HEMOGLOBIN g/dL 11 0* 10 8*   HEMATOCRIT % 35 8 33 4*   PLATELETS Thousands/uL 217 224     Results from last 7 days   Lab Units 21  0549 21  0822   SODIUM mmol/L 135* 136   POTASSIUM mmol/L 3 7 4 1   CHLORIDE mmol/L 105 106   CO2 mmol/L 24 25   BUN mg/dL 11 12   CREATININE mg/dL 0 76 0 71   CALCIUM mg/dL 8 6 8 5                      Lab Results   Component Value Date    BLOODCX No Growth After 5 Days  2021    BLOODCX No Growth After 5 Days  2021    BLOODCX No Growth After 5 Days  2019    URINECX No Growth <1000 cfu/mL 2021    URINECX >100,000 cfu/ml Klebsiella pneumoniae (A) 2020    URINECX >100,000 cfu/ml Escherichia coli (A) 2020       Input and Output Summary (last 24 hours): Intake/Output Summary (Last 24 hours) at 2021 1055  Last data filed at 2021 0219  Gross per 24 hour   Intake    Output 950 ml   Net -950 ml       Imaging:     No orders to display       *Labs /Radiology studies reviewed  *Medications reviewed and reconciled as needed  *Please refer to order section for additional ordered labs studies  *Case discussed with primary attending during morning huddle case rounds      Vitals:   Temp (24hrs), Av 2 °F (36 8 °C), Min:97 8 °F (36 6 °C), Max:98 7 °F (37 1 °C)    Temp:  [97 8 °F (36 6 °C)-98 7 °F (37 1 °C)] 97 8 °F (36 6 °C)  HR:  [73-78] 78  Resp:  [16-20] 20  BP: ()/(39-69) 125/58  SpO2:  [91 %-97 %] 91 %  Body mass index is 30 87 kg/m²       Physical Exam:   General Appearance: no distress, conversive  HEENT: PERRLA, conjuctiva normal; oropharynx clear; mucous membranes moist   Neck:  Supple, normal ROM, no JVD  Lungs: BBS with few crackles RLL, normal respiratory effort, no retractions, expiratory effort normal  CV: regular rate and rhythm; no rubs/murmurs/gallops, PMI normal   PPM dressing is intact  No hematoma palp'd    ABD: soft; ND/NT; +BS  EXT: + edema of legs up to lateral thighs; +edema of LUE  Skin: normal turgor, normal texture, no rashes  Psych: affect normal, mood normal  Neuro: AAO; cooperative and pleasant with me          Invasive Devices     Peripheral Intravenous Line            Peripheral IV 01/19/21 Left;Ventral (anterior) Forearm 2 days                 DVT prophylaxis:  Xarelto  Code Status: Level 1 - Full Code  Current Length of Stay: 1 day(s)    Total floor / unit time spent today 1 hour with more than 50% spent counseling/coordinating care  Counseling includes discussion with patient re: progress  and discussion with patient of his/her current medical state/information  Coordination of patient's care was performed in conjunction with primary service  Time invested included review of patient's labs, vitals, and management of their comorbidities with continued monitoring  In addition, this patient was discussed with medical team including physician and advanced extenders  The care of the patient was extensively discussed and appropriate treatment plan was formulated unique for this patient  ** Please Note: Fluency Direct voice to text software may have been used in the creation of this document   Audio transcription errors may occur**

## 2021-01-22 NOTE — CASE MANAGEMENT
Met w/pt and reviewed rehab routine and cm role  Pt resides with spouse in a one story home w/2 deborah  Pt reports having all necessary dme, walker, commode and shower seat  Pt confirms having hhc in the past from [facility] following cardiac surgery  Pt also confirms having contd outpt cardiac rehab at [facility]  Pt states she and her  share household tasks including cooking  Both pt and spouse drive  dtr lives about 15 min away and checks on them frequently but they have not required any assist with anything yet  [facility] [place] [facility] and has been made aware of [facility]  Cm reviewed team mtg on Wednesday and potential los  Following to assist w/dc planning needs

## 2021-01-23 LAB
ANION GAP SERPL CALCULATED.3IONS-SCNC: 7 MMOL/L (ref 4–13)
BUN SERPL-MCNC: 5 MG/DL (ref 5–25)
CALCIUM SERPL-MCNC: 8.1 MG/DL (ref 8.3–10.1)
CHLORIDE SERPL-SCNC: 101 MMOL/L (ref 100–108)
CO2 SERPL-SCNC: 25 MMOL/L (ref 21–32)
CREAT SERPL-MCNC: 0.63 MG/DL (ref 0.6–1.3)
GFR SERPL CREATININE-BSD FRML MDRD: 84 ML/MIN/1.73SQ M
GLUCOSE SERPL-MCNC: 93 MG/DL (ref 65–140)
POTASSIUM SERPL-SCNC: 3.6 MMOL/L (ref 3.5–5.3)
SODIUM SERPL-SCNC: 133 MMOL/L (ref 136–145)

## 2021-01-23 PROCEDURE — 80048 BASIC METABOLIC PNL TOTAL CA: CPT | Performed by: NURSE PRACTITIONER

## 2021-01-23 PROCEDURE — 0T9B70Z DRAINAGE OF BLADDER WITH DRAINAGE DEVICE, VIA NATURAL OR ARTIFICIAL OPENING: ICD-10-PCS | Performed by: PHYSICAL MEDICINE & REHABILITATION

## 2021-01-23 PROCEDURE — 99232 SBSQ HOSP IP/OBS MODERATE 35: CPT | Performed by: PHYSICAL MEDICINE & REHABILITATION

## 2021-01-23 PROCEDURE — 92526 ORAL FUNCTION THERAPY: CPT

## 2021-01-23 RX ORDER — LOPERAMIDE HYDROCHLORIDE 2 MG/1
2 CAPSULE ORAL 2 TIMES DAILY
Status: DISCONTINUED | OUTPATIENT
Start: 2021-01-23 | End: 2021-01-24

## 2021-01-23 RX ORDER — SACCHAROMYCES BOULARDII 250 MG
250 CAPSULE ORAL 2 TIMES DAILY
Status: DISCONTINUED | OUTPATIENT
Start: 2021-01-23 | End: 2021-01-29 | Stop reason: HOSPADM

## 2021-01-23 RX ORDER — FUROSEMIDE 20 MG/1
20 TABLET ORAL
Status: DISCONTINUED | OUTPATIENT
Start: 2021-01-23 | End: 2021-01-23

## 2021-01-23 RX ORDER — FUROSEMIDE 20 MG/1
20 TABLET ORAL ONCE
Status: COMPLETED | OUTPATIENT
Start: 2021-01-23 | End: 2021-01-23

## 2021-01-23 RX ORDER — FUROSEMIDE 20 MG/1
20 TABLET ORAL
Status: DISCONTINUED | OUTPATIENT
Start: 2021-01-24 | End: 2021-01-25

## 2021-01-23 RX ORDER — ACETAMINOPHEN 325 MG/1
650 TABLET ORAL EVERY 6 HOURS PRN
Status: DISCONTINUED | OUTPATIENT
Start: 2021-01-23 | End: 2021-01-29 | Stop reason: HOSPADM

## 2021-01-23 RX ADMIN — LOSARTAN POTASSIUM 25 MG: 25 TABLET, FILM COATED ORAL at 08:01

## 2021-01-23 RX ADMIN — PRASUGREL HYDROCHLORIDE 10 MG: 10 TABLET, FILM COATED ORAL at 08:02

## 2021-01-23 RX ADMIN — LOPERAMIDE HYDROCHLORIDE 2 MG: 2 CAPSULE ORAL at 17:26

## 2021-01-23 RX ADMIN — RANOLAZINE 1000 MG: 500 TABLET, FILM COATED, EXTENDED RELEASE ORAL at 21:43

## 2021-01-23 RX ADMIN — FUROSEMIDE 20 MG: 20 TABLET ORAL at 11:34

## 2021-01-23 RX ADMIN — Medication 250 MG: at 17:25

## 2021-01-23 RX ADMIN — FUROSEMIDE 40 MG: 40 TABLET ORAL at 08:01

## 2021-01-23 RX ADMIN — RIVAROXABAN 15 MG: 15 TABLET, FILM COATED ORAL at 17:26

## 2021-01-23 RX ADMIN — RANOLAZINE 1000 MG: 500 TABLET, FILM COATED, EXTENDED RELEASE ORAL at 08:02

## 2021-01-23 RX ADMIN — LOPERAMIDE HYDROCHLORIDE 2 MG: 2 CAPSULE ORAL at 01:45

## 2021-01-23 RX ADMIN — ATORVASTATIN CALCIUM 20 MG: 20 TABLET, FILM COATED ORAL at 17:25

## 2021-01-23 RX ADMIN — LOPERAMIDE HYDROCHLORIDE 2 MG: 2 CAPSULE ORAL at 21:43

## 2021-01-23 RX ADMIN — LOPERAMIDE HYDROCHLORIDE 2 MG: 2 CAPSULE ORAL at 13:54

## 2021-01-23 RX ADMIN — METOPROLOL SUCCINATE 50 MG: 50 TABLET, EXTENDED RELEASE ORAL at 08:01

## 2021-01-23 NOTE — PROGRESS NOTES
Clinical Swallow Evaluation      01/22/21 1630   Pain Assessment   Pain Assessment Tool 0-10   Pain Score No Pain   Restrictions/Precautions   Precautions Aspiration;Bed/chair alarms;Cognitive; Fall Risk;Hard of hearing;Pacemaker;Supervision on toilet/commode   ROM Restrictions Yes   LUE ROM Restriction Range Limitation  (Pacemaker precautions)   Speech/Swallow Mechanism Exam   Labial Symmetry WFL   Labial ROM WFL   Facial Symmetry WFL   Facial ROM WFL   Lingual Symmetry WFL   Lingual ROM WFL   Mandible WFL   Dentition Adequate   Volitional Cough Strong   Vocal Quality clear, adequate   Respratory Status Room air   Swallow Information   Current Risks for Dysphagia & Aspiration General debilitation;Cognitive deficit  (hx of cog deficits, , hx of esophageal reflux, esophagitis)   Current Symptoms/Concerns Difficulty chewing;HX Dysphagia  (as per pt, occasional choking "years ago")   Current Diet Dysphagia advance; Thin liquid   Baseline Diet Dyphagia advanced;Regular; Thin liquids   Consistencies Assessed and Performance   Materials Admnistered Soft/Level 3; Thin liquid;Pills; With thin liquid   Oral Stage Mild impaired   Phargngeal Stage Mild impaired;Aspiration risk   Swallow Mechanics Mild delayed;Swallow initation; Appears prompt;Good Larygneal rise   Esophageal Concerns Hx GERDS   Recommendations   Risk for Aspiration   (low)   Recommendations Dysphagia treatment   Diet Solid Recommendation Level 3 Dysphagia/ advanced/ soft to chew   Diet Liquid Recommendation Thin liquid   Recommended Form of Meds As desired; As tolerated   General Precautions Aspiration precautions; Feed only when alert;Minimize distractions;Upright as possible for all oral intake;Remain upright for 45 mins after meals  (OOB for all meals, ASSIST w/ tray set up)   Compensatory Swallowing Strategies Alternate solids and liquids; External pacing;Voluntary throat clear/cough to clear penetration   Results Reviewed with RN;PT/Family/Caregiver   Eating Type of Assistance Needed Set-up / clean-up   Amount of Physical Assistance Provided No physical assistance   Eating CARE Score 5   Swallow Assessment   Swallow Treatment Assessment Clinical swallow evaluation was completed during dinner meal to assess swallow function as pt is currently on a dysphagia level 3 diet and thin liquids  Oral mech appeared WNL as pt seated fully upright and OOB  Pt reports that she eats regular diet items, but appears to prefer meats in level 3 form as per her description-at baseline  Pt presented w/ full dysphagia level 3 trial tray of salmon, chopped broccoli and mashed potatoes as she consumed ~25% during evaluation and ~120cc thin liquids-pt noted to often talk during meal and required directions to increase meal consumption for assessment  Following assist w/ tray set up, pt able to self feed w/ effective bolus retrieval and lip seal-no anterior loss  Mastication and manipulation of items appeared slower and prolonged but overall functional for items consumed this meal  Fair bolus formation, noting diffuse min oral residual of fish, which pt used liquids to clear-appeared effective for clearance-no overt pocketing present  A-p transfers of liquids appeared [facility]  Overall swallow initiation of solids suspected to be mildly delayed due to increased time needed for oral prep but suspected to be far more timely w/ thins  Hyolaryngeal movement present to palpation  No overt s/s penetration/aspiration observed this meal  RN also present for end of session where pt took pills whole, one at a time w/ water-no difficulties noted  Pt denies any choking, coughing, food sticking or oral/pharyngeal dysphagia symptoms  At this time, will continue to recommend dysphagia level 3 diet and thin liquids w/ pt to be OOB for meals, assistance provided w/ tray set up and alternating solids/liquids  Suspect that pt's baseline may be a combination of regular solids and softer meats as per her description  Pt is recommended for skilled ST services during acute rehab stay for dysphagia therapy in order to determine pt's safest and least restrictive diet  Pt in agreement w/ treatment plan as she desires to return to enjoying additional preferred food items, which are considered to be on a regular diet  Swallow Assessment Prognosis   Prognosis Good   Prognosis Considerations Medical prognosis;Participation level;Previous level of function;New learning ability;Ability to carry over   SLP Therapy Minutes   SLP Time In 1630   SLP Time Out 1705   SLP Total Time (minutes) 35   SLP Mode of treatment - Individual (minutes) 35   SLP Mode of treatment - Concurrent (minutes) 0   SLP Mode of treatment - Group (minutes) 0   SLP Mode of treatment - Co-treat (minutes) 0   SLP Mode of Treatment - Total time(minutes) 35 minutes   SLP Cumulative Minutes 35   Therapy Time missed   Time missed?  No

## 2021-01-23 NOTE — PROGRESS NOTES
Internal Medicine Progress Note  Patient: Marta Bello  Age/sex: 79 y o  female  Medical Record #: [id number]      ASSESSMENT/PLAN: (Interval History)  Marta Bello is seen and examined and management for following issues:    Acute/chronic systolic/diastolic CHF, LVEF 97%/DJ 1 DD/moderate pulm HTN and mild-mod MR  · Acute CHF was felt to be 2/2 falsh pulm edema from coronary ischemia  · Continue Cozaar 25mg qd/Toprol  · Will give an extra 20mg of Lasix today and increase daily dose to 60mg  · Mild hyponatremia - will monitor  NSTEMI with hx CAD/CABG; PTCA/LAMINE p LAD and ostial LMCA  · Grafts to D1 and OM1 were 100% occluded and not amenable to intervention  · Continue Ranexa/Effient/Lipitor and Toprol     New onset PAF  · New to Xarelto (price check = $100 00/mo and family OK with this)  · Continue Xarelto, Toprol     Significant conversion pause; s/p dual chamber PPM with left poseterior fascicle lead 1/19/21  · Continue pacer precautions  · Watch incision = currently dressing still on; remove 1/26     Enteritis/colitis  · Patient has had multiple episodes of diarrhea  · Will schedule Imodium twice daily  · C diff negative  Aspiration PNA  · S/p antibiotic  · Resolved     Dysphagia  · Continue Dysphagia level 3/thins  · ST to see     Confusion  · Per PMR, getting UA/CX     Urine retention  · Has had large volumes with straight catheterization  · PMR checked UA - small leuks, positive nitrite - Culture pending  · Saeed to be inserted  LUE edema  · Per PMR    The above assessment and plan was reviewed and updated as determined by my evaluation of the patient on 1/23/2021      Labs:   Results from last 7 days   Lab Units 01/19/21  0546 01/18/21  0823   WBC Thousand/uL 7 36 10 07   HEMOGLOBIN g/dL 11 0* 10 8*   HEMATOCRIT % 35 8 33 4*   PLATELETS Thousands/uL 217 224     Results from last 7 days   Lab Units 01/23/21  0642 01/19/21  0549   SODIUM mmol/L 133* 135*   POTASSIUM mmol/L 3 6 3 7   CHLORIDE mmol/L 101 105   CO2 mmol/L 25 24   BUN mg/dL 5 11   CREATININE mg/dL 0 63 0 76   CALCIUM mg/dL 8 1* 8 6                   Review of Scheduled Meds:  Current Facility-Administered Medications   Medication Dose Route Frequency Provider Last Rate    atorvastatin  20 mg Oral Daily With Leticia Eduardo MD      furosemide  40 mg Oral Daily Aaron Pavon MD      loperamide  2 mg Oral TID PRN Aaron Pavon MD      losartan  25 mg Oral Daily Aaron Pavon MD      metoprolol succinate  50 mg Oral Daily Aaron Pavon MD      oxyCODONE  2 5 mg Oral Q4H PRN Aaron Pavon MD      prasugrel  10 mg Oral Daily Aaron Pavon MD      ranolazine  1,000 mg Oral Q12H Aaron Pavon MD      rivaroxaban  15 mg Oral Daily With Leticia Eduardo MD         Subjective/ HPI: Patient seen and examined  Patients overnight issues or events were reviewed with nursing or staff during rounds or morning huddle session  New or overnight issues include the following:     Patient has had multiple episodes of diarrhea today  She has had continued urinary retention  She denies any other complaints  ROS:   A 10 point ROS was performed; negative except as noted above         Imaging:     VAS upper limb venous duplex scan, unilateral/limited   Final Result by Lakshmi Jurado MD (01/22 2155)          *Labs /Radiology studies Reviewed  *Medications  reviewed and reconciled as needed  *Please refer to order section for additional ordered labs studies  *Case discussed with primary attending during morning huddle case rounds    Physical Examination:  Vitals:   Vitals:    01/22/21 2028 01/23/21 0529 01/23/21 0600 01/23/21 0801   BP: 122/60 128/59  130/64   BP Location: Left arm Left arm     Pulse: 78 80  78   Resp: 18 18     Temp: 98 2 °F (36 8 °C) 97 9 °F (36 6 °C)     TempSrc: Oral Oral     SpO2: 99% 93%     Weight:   66 2 kg (146 lb)    Height:           General Appearance: no distress, conversive  HEENT: PERRLA, conjuctiva normal; oropharynx clear; mucous membranes moist;   Neck:  Supple, no lymphadenopathy or thyromegaly  Lungs: CTA, normal respiratory effort, no retractions, expiratory effort normal  CV: regular rate and rhythm , PMI normal   ABD: soft non tender, no masses , no hepatic or splenomegaly  EXT: DP pulses intact, no lymphadenopathy, + bilat LE edema to lateral thighs  Skin: normal turgor, normal texture, no rash  Psych: affect normal, mood normal  Neuro: AAOx3      The above physical exam was reviewed and updated as determined by my evaluation of the patient on 1/23/2021  Invasive Devices     Peripheral Intravenous Line            Peripheral IV 01/19/21 Left;Ventral (anterior) Forearm 3 days                   VTE Pharmacologic Prophylaxis: Xarelto  Code Status: Level 1 - Full Code  Current Length of Stay: 2 day(s)      Total time spent:  30 minutes with more than 50% spent counseling/coordinating care  Counseling includes discussion with patient re: progress  and discussion with patient of his/her current medical state/information  Coordination of patient's care was performed in conjunction with primary service  Time invested included review of patient's labs, vitals, and management of their comorbidities with continued monitoring  In addition, this patient was discussed with medical team including physician and advanced extenders  The care of the patient was extensively discussed and appropriate treatment plan was formulated unique for this patient  ** Please Note:  voice to text software may have been used in the creation of this document   Although proof errors in transcription or interpretation are a potential of such software**

## 2021-01-23 NOTE — PROGRESS NOTES
SLP TAA     01/22/21 1630   Patient Data   Rehab Impairment Impairment of mobility, safety, and Activities of Daily Living (ADLs) due to Cardiac   Etiologic Diagnosis Afib with RVR   Date of Onset 01/07/21   Support System   Name Vandana Kothari   Relationship spouse   Baseline Information   Vocation   (retired)   Restrictions/Precautions   Precautions Aspiration;Bed/chair alarms;Cognitive; Fall Risk;Hard of hearing;Pacemaker;Supervision on toilet/commode   ROM Restrictions Yes   LUE ROM Restriction Range Limitation  (Pacemaker precautions)   Pain Assessment   Pain Assessment Tool 0-10   Pain Score No Pain   Eating Assessment   Swallow Precautions Yes   Bedside Swallow Results Yes   VBS Study Results No   Food To Mouth Yes   Able To Cut No   Positioning Upright;Out of Bed   Safety Needs Increase Time  (assist w/ tray set up)   Meal Assessed Dinner   QI: Swallowing/Nutritional Status Modified food consistency   Current Diet Dysphia III; Thin   Intake Mode PO;Self   Finishes Timely Yes   Opens Packages No   Findings Pt presenting with s/s suggestive of mild oropharyngeal dysphagia at time of assessment  Recommending pt continue on dysphagia level 3 diet and thin liquids at this time w/ plan to trial regular diet items to assess for potential diet upgrade  See SLP rehab note for full details  Type of Assistance Needed Set-up / clean-up   Amount of Physical Assistance Provided No physical assistance   Eating CARE Score 5   Discharge Information   Vocational Plan Retired/not working   Patient's Discharge Plan return home with assistance from family   Patient's Rehab Expectations "to be able to move my arm around again"   Barriers to Discharge Home Limited Family Support; Unsafe Home Setup; Decreased Cognitive Function;Decreased Strength;Decreased Endurance;Pain; Safety Considerations   Impressions Clinical swallow evaluation completed this session where pt is presenting with s/s suggestive of mild oropharyngeal dysphagia characterized by prolonged mastication, mildly decreased bolus formation resulting in oral residual and suspected delayed initiation of swallows w/ solids  Based on discussion w/ pt, suspect that pt's baseline may be a combination of regular solids and softer meats (similar to dysphagia level 3 meats) as per her description  Pt is recommended for skilled ST services during acute rehab stay for dysphagia therapy in order to determine pt's safest and least restrictive diet in order to support PO intake and goal of safely achieving baseline diet       SLP Therapy Minutes   SLP Time In 36   SLP Time Out 0407   SLP Total Time (minutes) 35   SLP Mode of treatment - Individual (minutes) 35   SLP Mode of treatment - Concurrent (minutes) 0   SLP Mode of treatment - Group (minutes) 0   SLP Mode of treatment - Co-treat (minutes) 0   SLP Mode of Treatment - Total time(minutes) 35 minutes   SLP Cumulative Minutes 35   Cumulative Minutes   Cumulative therapy minutes 225

## 2021-01-23 NOTE — ASSESSMENT & PLAN NOTE
- UA completed however does not appear to indicate infxn, UCx finalized and grew 60-69 K cfu/ml of candida and less then 10 K cfu/ml VRE-->d/w IM and they do not recommend treatment given UA and low cfu/ml counts for both the candida (which IM suspects is a contaminant) and the VRE  - TOV in process

## 2021-01-23 NOTE — PROGRESS NOTES
01/23/21 1150   Pain Assessment   Pain Assessment Tool Pain Assessment not indicated - pt denies pain   Pain Score No Pain   Restrictions/Precautions   Precautions Aspiration;Bed/chair alarms;Cognitive; Fall Risk;Pacemaker;Supervision on toilet/commode   Swallow Information   Current Risks for Dysphagia & Aspiration General debilitation;Cognitive deficit  (hx of cog deficits, GERD, esophagitis)   Current Symptoms/Concerns Difficulty chewing;HX Dysphagia   Current Diet Dysphagia advance; Thin liquid   Baseline Diet Dyphagia advanced;Regular; Thin liquids   Consistencies Assessed and Performance   Materials Admnistered Soft/Level 3;Regular/Solid; Thin liquid   Oral Stage Mild impaired   Phargngeal Stage Mild impaired;Aspiration risk   Swallow Mechanics Mild delayed;Swallow initation; Appears prompt;Good Larygneal rise   Esophageal Concerns Hx GERDS   Recommendations   Recommendations Dysphagia treatment   Diet Solid Recommendation Level 3 Dysphagia/ advanced/ soft to chew   Diet Liquid Recommendation Thin liquid   Recommended Form of Meds As desired; As tolerated   General Precautions Aspiration precautions; Feed only when alert;Minimize distractions;Upright as possible for all oral intake;Remain upright for 45 mins after meals  (OOB for meals, assist with set up)   Compensatory Swallowing Strategies Alternate solids and liquids; External pacing;Voluntary throat clear/cough to clear penetration   Results Reviewed with PT/Family/Caregiver   Eating   Type of Assistance Needed Set-up / clean-up   Amount of Physical Assistance Provided No physical assistance   Eating CARE Score 5   Swallow Assessment   Swallow Treatment Assessment Pt seen for skilled dysphagia tx session with lunch  Pt alert and interactive seen upright in bed in room with meal  Pt seen with trial regular diet tray and thin liquids- items assess included chicken breast, pasta, and green beans and sliced pears with thin liquids   Pt required set up assistance with tray but no physical assistance self feeding  Meal completion 50% and 240cc liquids  Pt ate food items c good bolus retrieval,oral control, slow oral processing, mildly delayed transfers and swallows  No retention or pocketing noted  Pt drinking thin liquids by cup and straw- good bolus retrieval and oral control , functional transfers and swallows  No overt s/s aspiration noted  Pt tolerated well but eating only half of items stating "its okay, just not that hungry"  Pt stated she usually eats a light lunch at home consisting of soup or a sandwich  Pt expressing she eats "almost anything" but unclear as to whether she prefers softer items or not  At this time, cont Dysphagia level 3 diet and thin liquids with aspiration precautions, Fully upright or OOB as able, set up with tray, distant supervision, slow rate, small bites, alternate food and liquids  Cont further dysphagia tx sessions to trial upgraded regular diet items as able as appropriate to cont to assess swallow function and achieve LRD at this time  Swallow Assessment Prognosis   Prognosis Good   Prognosis Considerations Medical prognosis; Medical diagnosis;Participation level;Previous level of function;New learning ability;Ability to carry over   SLP Therapy Minutes   SLP Time In 1150   SLP Time Out 1220   SLP Total Time (minutes) 30   SLP Mode of treatment - Individual (minutes) 30   SLP Mode of treatment - Concurrent (minutes) 0   SLP Mode of treatment - Group (minutes) 0   SLP Mode of treatment - Co-treat (minutes) 0   SLP Mode of Treatment - Total time(minutes) 30 minutes   SLP Cumulative Minutes 65   Therapy Time missed   Time missed?  No

## 2021-01-23 NOTE — PROGRESS NOTES
Physical Medicine and Rehabilitation Progress Note  Freddie Huggins 79 y o  female MRN: [id number]  Unit/Bed#: -90 Encounter: [id number]    HPI: Freddie Huggins is a 79 y o  female who presented to the [facility] with chest pain and elevated troponins and underwent cardiac cath and stent placement however later developed new onset a-fib and tachy-jillian syndrome requiring pacer placement on 1/19  Course complicated by PNA which was treated with abx and colitis  Chief Complaint: cardiac debility     Subjective: patient seen at bedside and patient currently comfortable     ROS: A 10 point ROS was performed; negative except as noted above       Assessment/Plan:      A-fib [facility])  Assessment & Plan  - new onset in acute care   - at home on toprol XL (24 hr) 25 mg qd however was increased to 50 mg after prior hospitalization (IM managing BB)   - started on xarelto 15 mg qdinner in acute care per EP recommendations (CrCl borderline for 20 mg qdinner dose at 50 5 therefore EP elected for lower dose)   - OP FU with Dr Song Raphael and Tobin Wilkerson (scheduled for 2/18)     CAD (coronary artery disease)  Assessment & Plan  - h/o CABG  - elevated troponins in acute care and patient is s/p LAMINE on 1/11/21  - at home was on zocor 40 mg qpm however was switched to lipitor 20 mg qpm after prior hospitalization   - home asa stopped in acute care per EP recommendations    - continued on home effient 10 mg qd per EP recommendations  - home ranexa increased from 500 mg q12 to 1000 mg q12 after prior hospitalization    - at home on toprol XL (24 hr) 25 mg qd however increased to 50 mg after prior hospitalization (IM managing BB dose while in rehab unit)   - OP FU with Dr Song Raphael and Joe Thomas (scheduled for 2/18)           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[facility] [address] )  Assessment & Plan  - grade 1 DD  - EF 45%  - at home on torsemide 20 mg qd however per patient she takes a 1/2 tab daily however ultimately it appears this was switched to lasix 40 mg qd after prior hospitalization  - chest XR of 1/20 revealed small B/L pleural effusions in acute care (however patient also had PNA in acute care and is s/p abx)  - daily weights, I/O   - IM managing while in rehab unit   - OP FU with Dr Lonny Gandhi and Shell Cheung (which is scheduled for 2/18)             * 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[facility])  Assessment & Plan  - s/p pacer on 1/19  - FU for device and incision check scheduled for 2/1  - OP FU with Dr Lonny Gandhi and Macy Storey (scheduled for 2/18)     Urinary retention  Assessment & Plan  - urinary retention protocol   - UA completed however does not appear to indicate infxn, UCx PENDING     Edema of left upper extremity  Assessment & Plan  - in the setting of recent pacer placement   - will check LUE doppler     CKD (chronic kidney disease)  Assessment & Plan  - Cr currently 0 76  - baseline Cr appears to be 0 8-1 0  - IM monitoring     Anemia  Assessment & Plan  - Hg currently stable at 11 0  - IM monitoring     Hyponatremia  Assessment & Plan  - mild at 135 and can be considered low normal     Elevated AST (SGOT)  Assessment & Plan  - mildly elevated at 68 (ULN 45) and trending down  - IM monitoring     Dysphagia  Assessment & Plan  - mild at level 3 diet  - will have ST evaluate     PAD (peripheral artery disease) (HCC)  Assessment & Plan  - diffuse PAD with B/L LE occlusive disease  - chronic occlusion of Rt subclavian artery  - stenosis of L subclavian artery, L carotid artery, celiac artery, B/L renal arteries, SMA, JETHRO, L ALBERTO  - home asa stopped in acute care per EP recommendations  - home effient 10 mg qd continued per EP recommendations   - was on zocor 40 mg qpm previously however switched to lipitor 20 mg qpm after last hospitalization   - OP FU with Dr Chantelle Ivory hypertension  Assessment & Plan  - at home on regimen of norvsasc 5 mg qd however recently increased to 10 mg after prior hospitalization, imdur (24 hr) 60 mg qd however increased to 90 mg after prior hospitalization, toprol XL (24 hr) 25 mg qd however increased to 50 mg after prior hospitalization, and cozaar 50 mg qd (patient also appears to have been on torsemide 20 mg qd however patient states she takes a 1/2 tab qd and ultimately this appears to have been switched to lasix 40 mg after prior hospitalization)   - IM managing        Scheduled Meds:  Current Facility-Administered Medications   Medication Dose Route Frequency Provider Last Rate    atorvastatin  20 mg Oral Daily With Yoel Vogel MD      furosemide  40 mg Oral Daily Jose Daniel Li MD      loperamide  2 mg Oral TID PRN Jose Daniel Li MD      losartan  25 mg Oral Daily Jose Daniel Li MD      metoprolol succinate  50 mg Oral Daily Jose Daniel Li MD      oxyCODONE  2 5 mg Oral Q4H PRN Jose Daniel Li MD      prasugrel  10 mg Oral Daily Jose Daniel Li MD      ranolazine  1,000 mg Oral Q12H Jose Daniel Li MD      rivaroxaban  15 mg Oral Daily With Yoel Vogel MD            Incidental findings:     1) abnormalities on echocardiogram while in acute care including but not limited to elevated peak PA pressure: OP FU with Dr Lynnette Ugalde and Derrick CHAVEZ     2) abnormalities on EKG while in acute care: in the setting of paced rhythm and patient was evaluated by cards with no further inpt PAULSON/intervention recommended; OP FU with Dr Lynnette Ugalde and Derrick CHAVEZ     3) low lipase level on 1/12: unclear clinic significance; OP FU with PCP with further testing/treatment and/or specialist referral at PCP's discretion      4) small hiatal hernia: asymptomatic; OP FU with PCP with further testing/treatment and/or specialist referral at PCP's discretion      5) gallstones w/distended gallbladder (no pericholecystatic inflammatory findings per imaging report): asymptomatic; OP FU with PCP with further testing/treatment and/or specialist referral at PCP's discretion      6) non-obstructive 1 mm L kidney stone: asymptomatic; OP FU with PCP with further testing/treatment and/or specialist referral at PCP's discretion      DVT ppx: xarelto           Objective:    Functional Update:  Mobility: max   Transfers: total   ADLs: total       Physical Exam:  Vitals:    01/22/21 2028   BP: 122/60   Pulse: 78   Resp: 18   Temp: 98 2 °F (36 8 °C)   SpO2: 99%         General: alert, no apparent distress, cooperative and comfortable  HEENT:  Head: Normal, normocephalic, atraumatic    Eye: Normal external eye, conjunctiva, lidsc cornea  Ears: Normal external ears  Nose: Normal external nose, mucus membranes  CARDIAC:  +S1/2  LUNGS:  no abnormal respiratory pattern, no retractions noted, non-labored breathing   ABDOMEN:  soft NT  EXTREMITIES:  no cyanosis  NEURO:  awake, alert   PSYCH:  mood/affect currently stable     Diagnostic Studies:  VAS upper limb venous duplex scan, unilateral/limited    (Results Pending)       Laboratory:   Results from last 7 days   Lab Units 01/19/21  0546 01/18/21  0823 01/17/21  0520   HEMOGLOBIN g/dL 11 0* 10 8* 11 3*   HEMATOCRIT % 35 8 33 4* 34 8   WBC Thousand/uL 7 36 10 07 8 72     Results from last 7 days   Lab Units 01/19/21  0549 01/18/21  0822 01/17/21  0520   BUN mg/dL 11 12 14   SODIUM mmol/L 135* 136 137   POTASSIUM mmol/L 3 7 4 1 3 7   CHLORIDE mmol/L 105 106 102   CREATININE mg/dL 0 76 0 71 0 99   AST U/L  --   --  68*   ALT U/L  --   --  48

## 2021-01-23 NOTE — ASSESSMENT & PLAN NOTE
- in the setting of recent pacer placement   - LUE negative for DVT, may be 2/2 to dependent edema in the setting or recent PPM placement

## 2021-01-23 NOTE — PROGRESS NOTES
PM&R Coverage Progress Note:    HPI: Dev Kahn is a 79 y o  female who presented to the [address] with chest pain and elevated troponins and underwent cardiac cath and stent placement however later developed new onset a-fib and tachy-jillian syndrome requiring pacer placement on 1/19  Course complicated by PNA which was treated with abx and colitis  ASSESSMENT: Stable      PLAN:    Rehabilitation   Continue current rehabilitation plan of care to maximize function  o Has pain surrounding her PPM site  Ordered Tylenol for pain  o Baseline short term memory cognitive deficits: confirmed by   o Provided phone call to  - clarified some questions he had  Medical issues   Urinary retention: +Saeed ordered today  High straight cath volumes x 4  Urine Cx pending   Edema in legs: patient given extra dose Lasix today by IM consultants   Colitis: loose stools: Imodium scheduled 2mg BID   PPM site stable   Afib: managed on xarelto for [facility]   Continue current medical plan of care  Appreciate IM consultants co-management  SUBJECTIVE: Patient seen face to face  She is not aware why she is in the hospital     *Personally called her  today and answered question  ROS:  A ten point review of systems was completed and pertinent positives are listed in subjective section  All other systems reviewed were negative  OBJECTIVE:   /64   Pulse 78   Temp 97 9 °F (36 6 °C) (Oral)   Resp 18   Ht 4' 10" (1 473 m)   Wt 66 2 kg (146 lb)   SpO2 93%   BMI 30 51 kg/m²     Physical Exam  Vitals signs and nursing note reviewed  Constitutional:       General: She is not in acute distress  HENT:      Head: Normocephalic and atraumatic  Nose: Nose normal       Mouth/Throat:      Mouth: Mucous membranes are moist    Eyes:      Conjunctiva/sclera: Conjunctivae normal    Neck:      Musculoskeletal: Neck supple     Cardiovascular:      Rate and Rhythm: Normal rate and regular rhythm  Pulses: Normal pulses  Pulmonary:      Effort: Pulmonary effort is normal       Breath sounds: Normal breath sounds  No wheezing or rales  Abdominal:      General: Bowel sounds are normal  There is no distension  Palpations: Abdomen is soft  Tenderness: There is no abdominal tenderness  Musculoskeletal:      Right lower leg: Edema present  Left lower leg: Edema present  Skin:     Comments: PPM site covered with dressing, tender with movement   Neurological:      Mental Status: She is alert and oriented to person, place, and time     Psychiatric:         Mood and Affect: Mood normal           Lab Results   Component Value Date    WBC 7 36 01/19/2021    HGB 11 0 (L) 01/19/2021    HCT 35 8 01/19/2021    MCV 98 01/19/2021     01/19/2021     Lab Results   Component Value Date    SODIUM 133 (L) 01/23/2021    K 3 6 01/23/2021     01/23/2021    CO2 25 01/23/2021    BUN 5 01/23/2021    CREATININE 0 63 01/23/2021    GLUC 93 01/23/2021    CALCIUM 8 1 (L) 01/23/2021     Lab Results   Component Value Date    INR 0 91 01/11/2021    INR 0 91 01/11/2021    INR 0 87 01/07/2021    PROTIME 12 3 01/11/2021    PROTIME 12 4 01/11/2021    PROTIME 11 9 01/07/2021           Current Facility-Administered Medications:     atorvastatin (LIPITOR) tablet 20 mg, 20 mg, Oral, Daily With Umm Quintero MD, 20 mg at 01/22/21 1706    furosemide (LASIX) tablet 40 mg, 40 mg, Oral, Daily, Roque Stover MD, 40 mg at 01/23/21 0801    loperamide (IMODIUM) capsule 2 mg, 2 mg, Oral, TID PRN, Roque Stover MD, 2 mg at 01/23/21 0145    losartan (COZAAR) tablet 25 mg, 25 mg, Oral, Daily, Roque Stover MD, 25 mg at 01/23/21 0801    metoprolol succinate (TOPROL-XL) 24 hr tablet 50 mg, 50 mg, Oral, Daily, Roque Stover MD, 50 mg at 01/23/21 0801    oxyCODONE (ROXICODONE) IR tablet 2 5 mg, 2 5 mg, Oral, Q4H PRN, Roque Stover MD    prasugrel (EFFIENT) tablet 10 mg, 10 mg, Oral, Daily, Cesar Steve MD, 10 mg at 01/23/21 0802    ranolazine (RANEXA) 12 hr tablet 1,000 mg, 1,000 mg, Oral, Q12H, Cesar Steve MD, 1,000 mg at 01/23/21 0802    rivaroxaban (XARELTO) tablet 15 mg, 15 mg, Oral, Daily With Nae Davies MD, 15 mg at 01/22/21 1706    Past Medical History:   Diagnosis Date    Anal fissure     Cardiac disorder     Cognitive changes 12/23/2020    Esophageal reflux     Esophagitis, reflux     Hemorrhoids     Hepatic hemangioma     Last Assessed: 1/13/2015    Herpes zoster     History of colonic polyps     Hypertension     Ischemic colitis ([facility] [facility] )     Lumbar herniated disc     Malignant neoplasm without specification of site ([facility] [facility] )     Nephrolithiasis     L   Lithotripsy    Nontoxic single thyroid nodule     Last Assessed: 1/13/2015    Osteoarthritis     Overactive bladder     Raynaud disease     Respiratory system disease     [facility]     Spinal stenosis     PONCHO (stress urinary incontinence, female)     Uterovaginal prolapse     Grade I-II       Patient Active Problem List    Diagnosis Date Noted    Edema of left upper extremity 01/22/2021    Urinary retention 01/22/2021    CAD (coronary artery disease) 01/21/2021    A-fib ([facility] [address] ) 01/21/2021    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[facility] [address] ) 01/21/2021    P[facility]peripheral [facility] 01/21/2021    Dysphagia 01/21/2021    Elevated AST (SGOT) 01/21/2021    Hyponatremia 01/21/2021    Anemia 01/21/2021    CKD (chronic kidney disease) 01/21/2021    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

## 2021-01-24 LAB
ANION GAP SERPL CALCULATED.3IONS-SCNC: 6 MMOL/L (ref 4–13)
BUN SERPL-MCNC: 6 MG/DL (ref 5–25)
CALCIUM SERPL-MCNC: 7.8 MG/DL (ref 8.3–10.1)
CHLORIDE SERPL-SCNC: 97 MMOL/L (ref 100–108)
CO2 SERPL-SCNC: 28 MMOL/L (ref 21–32)
CREAT SERPL-MCNC: 0.57 MG/DL (ref 0.6–1.3)
GFR SERPL CREATININE-BSD FRML MDRD: 87 ML/MIN/1.73SQ M
GLUCOSE SERPL-MCNC: 89 MG/DL (ref 65–140)
POTASSIUM SERPL-SCNC: 3.6 MMOL/L (ref 3.5–5.3)
SODIUM SERPL-SCNC: 131 MMOL/L (ref 136–145)

## 2021-01-24 PROCEDURE — 92526 ORAL FUNCTION THERAPY: CPT

## 2021-01-24 PROCEDURE — 99232 SBSQ HOSP IP/OBS MODERATE 35: CPT | Performed by: PHYSICAL MEDICINE & REHABILITATION

## 2021-01-24 PROCEDURE — 97116 GAIT TRAINING THERAPY: CPT

## 2021-01-24 PROCEDURE — 97530 THERAPEUTIC ACTIVITIES: CPT

## 2021-01-24 PROCEDURE — 80048 BASIC METABOLIC PNL TOTAL CA: CPT | Performed by: INTERNAL MEDICINE

## 2021-01-24 PROCEDURE — 97535 SELF CARE MNGMENT TRAINING: CPT

## 2021-01-24 PROCEDURE — 99223 1ST HOSP IP/OBS HIGH 75: CPT | Performed by: INTERNAL MEDICINE

## 2021-01-24 RX ORDER — LOPERAMIDE HYDROCHLORIDE 2 MG/1
2 CAPSULE ORAL 3 TIMES DAILY
Status: DISCONTINUED | OUTPATIENT
Start: 2021-01-24 | End: 2021-01-26

## 2021-01-24 RX ORDER — LIDOCAINE HYDROCHLORIDE 20 MG/ML
1 JELLY TOPICAL EVERY 4 HOURS PRN
Status: DISCONTINUED | OUTPATIENT
Start: 2021-01-24 | End: 2021-01-29 | Stop reason: HOSPADM

## 2021-01-24 RX ADMIN — LOSARTAN POTASSIUM 25 MG: 25 TABLET, FILM COATED ORAL at 09:48

## 2021-01-24 RX ADMIN — LOPERAMIDE HYDROCHLORIDE 2 MG: 2 CAPSULE ORAL at 09:48

## 2021-01-24 RX ADMIN — LOPERAMIDE HYDROCHLORIDE 2 MG: 2 CAPSULE ORAL at 21:26

## 2021-01-24 RX ADMIN — FUROSEMIDE 20 MG: 20 TABLET ORAL at 11:56

## 2021-01-24 RX ADMIN — PRASUGREL HYDROCHLORIDE 10 MG: 10 TABLET, FILM COATED ORAL at 09:48

## 2021-01-24 RX ADMIN — LOPERAMIDE HYDROCHLORIDE 2 MG: 2 CAPSULE ORAL at 16:56

## 2021-01-24 RX ADMIN — RANOLAZINE 1000 MG: 500 TABLET, FILM COATED, EXTENDED RELEASE ORAL at 09:48

## 2021-01-24 RX ADMIN — RIVAROXABAN 15 MG: 15 TABLET, FILM COATED ORAL at 16:56

## 2021-01-24 RX ADMIN — Medication 250 MG: at 17:00

## 2021-01-24 RX ADMIN — Medication 250 MG: at 09:48

## 2021-01-24 RX ADMIN — METOPROLOL SUCCINATE 50 MG: 50 TABLET, EXTENDED RELEASE ORAL at 09:47

## 2021-01-24 RX ADMIN — FUROSEMIDE 40 MG: 40 TABLET ORAL at 09:46

## 2021-01-24 RX ADMIN — ATORVASTATIN CALCIUM 20 MG: 20 TABLET, FILM COATED ORAL at 16:56

## 2021-01-24 RX ADMIN — RANOLAZINE 1000 MG: 500 TABLET, FILM COATED, EXTENDED RELEASE ORAL at 21:26

## 2021-01-24 NOTE — PROGRESS NOTES
Occupational Therapy Treatment Note         01/24/21 5572   Pain Assessment   Pain Assessment Tool Pain Assessment not indicated - pt denies pain   Restrictions/Precautions   Precautions Aspiration;Bed/chair alarms;Cognitive; Fall Risk;Supervision on toilet/commode;Pain; Saeed;Cardiac/sternal  (L UE PACER PRECAUTIONS)   Lifestyle   Autonomy "I am going home, I don't care how I do it I'll figure it out once I am there"    Lower Body Dressing   Type of Assistance Needed Physical assistance   Amount of Physical Assistance Provided Total assistance   Comment assist x2 bed level rolling for doffing of pants and brief    Lower Body Dressing CARE Score 1   Putting On/Taking Off Footwear   Type of Assistance Needed Physical assistance   Amount of Physical Assistance Provided Total assistance   Putting On/Taking Off Footwear CARE Score 1   Roll Left and Right   Type of Assistance Needed Physical assistance   Amount of Physical Assistance Provided 75% or more   Roll Left and Right CARE Score 2   Lying to Sitting on Side of Bed   Type of Assistance Needed Physical assistance   Amount of Physical Assistance Provided 75% or more   Lying to Sitting on Side of Bed CARE Score 2   Sit to Stand   Type of Assistance Needed Physical assistance   Amount of Physical Assistance Provided Total assistance   Comment max assist x1 with min assist of 2nd for safety, no device  Sit to Stand CARE Score 1   Bed-Chair Transfer   Type of Assistance Needed Physical assistance   Amount of Physical Assistance Provided Total assistance   Comment max assist x1 sit pivot/stand pivot transfer with no device, min assist of 2nd present for safety      Chair/Bed-to-Chair Transfer CARE Score 1   Toileting Hygiene   Type of Assistance Needed Physical assistance   Amount of Physical Assistance Provided Total assistance   Comment max assist x1 in front to stand, assist of 2nd for clothing/hygiene    Toileting Hygiene CARE Score 1   Toilet Transfer   Type of Assistance Needed Physical assistance; Adaptive equipment   Amount of Physical Assistance Provided Total assistance   Comment max assist x1 attempted sit pivot but pt unable to follow directions, instead performed max assist x1 stand pivot transfer with min assist of 2nd from behind    Toilet Transfer CARE Score 1   Cognition   Overall Cognitive Status Impaired   Arousal/Participation Alert   Attention Attends with cues to redirect   Orientation Level Oriented to person;Oriented to situation   Memory Decreased short term memory;Decreased recall of recent events;Decreased recall of precautions   Following Commands Follows one step commands with increased time or repetition   Comments pt requires increased time to recall being in the hospital  Pt requires choices to identify which body part pt had sx on    Activity Tolerance   Activity Tolerance Patient limited by Marilin Connolly and RN Antoine made aware pt reporting c/o burning at vagina and frequent loose stools    Assessment   Treatment Assessment Pt participated in skilled OT tx session focused on stand pivot transfers without device, pt/family education, toileting  See above for further details on functional performance  Pt continues to be frequently incontinent of bowel which limits her ability to participate in therapy  2* pt having dominguez, pt returned to bed after bowel incontinence to complete dominguez care  OT recommending when in bed pt does not wear brief and instead uses blue pads in order to better identify when pt needs to be changed  Pt completes stand pivot transfers with max assist x1 and min assist of 2nd present posteriorly for safety  OT spoke with pt's  during session and educated him on pt's deficits and recommendation for continued stay, which he is agreeable to but feels bad because pt calls him and "yells" at him to bring her home   OT educated pt on her deficits and risk for falls at home and need to continue to participate in therapy to improve functional performance  After session OT then called pt's daughter Pam Robledo with Dr Steven Courts  Informed Pam Robledo that from therapy standpoint next updated would be provided Wednesday after team meeting  Pam Robledo and pt's  in agreement with pt continuing stay at [facility], however they report pt will hang up the phone on them when they say they cannot yet bring her home  Pt will continue to benefit from skilled OT intervention to address standing balance/tolernace, sit <> stand transfers, toileting with BSC, R UE strengthening (L UE pacer precautions) in order to maximize functional independence in ADLS, functional mobility/transfers, while decreasing burden of care  Of note, pt's  reports w/c should fit in house and hallways are 35"+ wide  Prognosis Fair   Problem List Decreased strength;Decreased endurance; Impaired balance;Decreased mobility; Decreased cognition;Decreased safety awareness;Pain   Plan   Treatment/Interventions ADL retraining;Functional transfer training; Therapeutic exercise; Endurance training;Patient/family training;Cognitive reorientation; Compensatory technique education   Progress Slow progress, decreased activity tolerance   Recommendation   OT Discharge Recommendation   (pending progress )   OT Therapy Minutes   OT Time In 0830   OT Time Out 1000   OT Total Time (minutes) 90   OT Mode of treatment - Individual (minutes) 90   OT Mode of treatment - Concurrent (minutes) 0   OT Mode of treatment - Group (minutes) 0   OT Mode of treatment - Co-treat (minutes) 0   OT Mode of Treatment - Total time(minutes) 90 minutes   OT Cumulative Minutes 190   Therapy Time missed   Time missed?  No

## 2021-01-24 NOTE — CONSULTS
Consultation - [address] [facility]  Juany New 79 y o  female MRN: [id number]  Unit/Bed#: [facility] 007-56 Encounter: [id number]        Consults    Reason for Consult / Principal Problem:     Acute diarrhea      ASSESSMENT AND PLAN:       69-year-old with CHF EF 45%, pulmonary hypertension, CAD status post drug-eluting stent on presurgical, AFib on Xarelto status post pacemaker placement, multiple episodes of schema colitis, recently admitted with CHF exacerbation/pulmonary edema, now in rehab  GI consulted for diarrhea  1  Diarrhea  Patient was consistently having loose bowel movements for the last couple of weeks  Currently not on any new medications  Bowel movements have slowed down with Imodium scheduled  C diff was negative within the last week  CT scan showed enteritis/colitis likely infectious  Since bowel movements are improving and patient does not have any other GI or constitutional symptoms, would hold off on doing further workup  If symptoms worsen then do more infectious workup as well as decide on EGD and colonoscopy  Continue Imodium scheduled for now  2  History of ischemic colitis  Patient was supposed to get colonoscopy after last episode of schema colitis in 2019  This can be done as an outpatient if patient would prefer to get it done  Given her cardiopulmonary comorbidities, even if colon cancer was found, she would not be a treatment candidate  3  CAD  Status post LAMINE  On presurgel  4  CHF/pulmonary hypertension  Currently breathing at baseline  On diuretics      Debbie Butterfield MD  Gastroenterology Fellow  [address]  Date: January 24, 2021            ______________________________________________________________________    HPI:  69-year-old with CHF EF 45%, pulmonary hypertension, CAD status post drug-eluting stent on presurgical, AFib on Xarelto status post pacemaker placement, multiple episodes of schema colitis, recently admitted with CHF exacerbation/pulmonary edema, now in rehab  GI consulted for diarrhea  Patient says she started having loose bowel movements consistently for the last couple of weeks  She had a lot of bowel movements until yesterday  This morning she has had only 1 bowel movement which was loose  Patient also says that she intermittently has diarrhea at home  No blood in bowel movement  No abdominal pain, nausea, vomiting  She is tolerating diet  No fevers  CT scan done on 01/13/2021 showed thickening of transverse colon and descending and thickening of small bowel loops concern for enteritis/colitis  C diff was negative  EGD in 2016 was normal   Stomach biopsies were negative for significant disease  Colonoscopy in 2016 was done after an episode of ischemic colitis  It showed resolving inflammation in the sigmoid confirmed on biopsy  Patient had another episode of ischemic colitis in 2019  Colonoscopy was recommended but not performed  REVIEW OF SYSTEMS:    CONSTITUTIONAL: Denies any fever, chills, rigors, and weight loss  HEENT: No earache or tinnitus  Denies hearing loss or visual disturbances  CARDIOVASCULAR: No chest pain or palpitations  RESPIRATORY: Denies any cough, hemoptysis, shortness of breath or dyspnea on exertion  GASTROINTESTINAL: As noted in the History of Present Illness  GENITOURINARY: No problems with urination  Denies any hematuria or dysuria  NEUROLOGIC: No dizziness or vertigo, denies headaches  MUSCULOSKELETAL: Denies any muscle or joint pain  SKIN: Denies skin rashes or itching  ENDOCRINE: Denies excessive thirst  Denies intolerance to heat or cold  PSYCHOSOCIAL: Denies depression or anxiety  Denies any recent memory loss         Historical Information   Past Medical History:   Diagnosis Date    Anal fissure     Cardiac disorder     Cognitive changes 12/23/2020    Esophageal reflux     Esophagitis, reflux     Hemorrhoids     Hepatic hemangioma     Last Assessed: 1/13/2015    Herpes zoster     History of colonic polyps     Hypertension     [facility]     Lumbar herniated disc     [facility]     Nephrolithiasis     L  Lithotripsy    Nontoxic single thyroid nodule     Last Assessed: 1/13/2015    Osteoarthritis     Overactive bladder     Raynaud disease     Respiratory system disease     Sjogren's disease ([facility]     Spinal stenosis     PONCHO (stress urinary incontinence, female)     Uterovaginal prolapse     Grade I-II     Past Surgical History:   Procedure Laterality Date    APPENDECTOMY  1947    CARDIAC SURGERY      CABG    CATARACT EXTRACTION Bilateral     COLONOSCOPY  2012    Fiberoptic    COLONOSCOPY      Resolved: 2006 - 2012 5 year f/u    CORONARY ANGIOPLASTY WITH STENT PLACEMENT      CORONARY ARTERY BYPASS GRAFT      Resolved: 2012    ESOPHAGOGASTRODUODENOSCOPY  2012    Diagnostic    HEMORROIDECTOMY      KNEE SURGERY      LITHOTRIPSY      Renal    MALIGNANT SKIN LESION EXCISION      Face; Resolved: 2004    NV ESOPHAGOGASTRODUODENOSCOPY TRANSORAL DIAGNOSTIC N/A 4/13/2016    Procedure: EGD AND COLONOSCOPY;  Surgeon: Cody Murray MD;  [facility];   Service: Gastroenterology    RENAL ARTERY STENT      SKIN LESION EXCISION      Scalp    SOFT TISSUE TUMOR RESECTION      Shoulder; Resolved: 1995    THROMBOLYSIS      Postoperative Thrombolysis PTCA    TONSILLECTOMY      Resolved: 1944     Social History   Social History     Substance and Sexual Activity   Alcohol Use Not Currently    Frequency: Monthly or less    Comment: social     Social History     Substance and Sexual Activity   Drug Use No     Social History     Tobacco Use   Smoking Status Never Smoker   Smokeless Tobacco Never Used     Family History   Problem Relation Age of Onset    Heart disease Mother     Hypertension Mother     Osteoporosis Mother     Heart disease Father     Hypertension Father    Jay Davalos Ulcerative colitis Family     Colon polyps Family        Meds/Allergies     Medications Prior to Admission   Medication    amLODIPine (NORVASC) 10 mg tablet    apixaban (ELIQUIS) 5 mg    aspirin (ECOTRIN LOW STRENGTH) 81 mg EC tablet    atorvastatin (LIPITOR) 20 mg tablet    docusate sodium (Colace) 100 mg capsule    furosemide (LASIX) 40 mg tablet    isosorbide mononitrate (IMDUR) 30 mg 24 hr tablet    metoprolol succinate (TOPROL-XL) 50 mg 24 hr tablet    multivitamin (THERAGRAN) TABS    nitroglycerin (NITROSTAT) 0 4 mg SL tablet    pantoprazole (PROTONIX) 40 mg tablet    polyethylene glycol (MIRALAX) 17 g packet    prasugrel (EFFIENT) tablet    ranolazine (RANEXA) 1000 MG SR tablet    rivaroxaban (XARELTO) 20 mg tablet    senna (SENOKOT) 8 6 mg     Current Facility-Administered Medications   Medication Dose Route Frequency    acetaminophen (TYLENOL) tablet 650 mg  650 mg Oral Q6H PRN    atorvastatin (LIPITOR) tablet 20 mg  20 mg Oral Daily With Dinner    furosemide (LASIX) tablet 20 mg  20 mg Oral Daily With Lunch    furosemide (LASIX) tablet 40 mg  40 mg Oral Daily    lidocaine (URO-JET) 2 % urethral/mucosal gel 1 application  1 application Urethral L7W PRN    loperamide (IMODIUM) capsule 2 mg  2 mg Oral TID PRN    loperamide (IMODIUM) capsule 2 mg  2 mg Oral TID    losartan (COZAAR) tablet 25 mg  25 mg Oral Daily    metoprolol succinate (TOPROL-XL) 24 hr tablet 50 mg  50 mg Oral Daily    oxyCODONE (ROXICODONE) IR tablet 2 5 mg  2 5 mg Oral Q4H PRN    prasugrel (EFFIENT) tablet 10 mg  10 mg Oral Daily    ranolazine (RANEXA) 12 hr tablet 1,000 mg  1,000 mg Oral Q12H    rivaroxaban (XARELTO) tablet 15 mg  15 mg Oral Daily With Dinner    saccharomyces boulardii (FLORASTOR) capsule 250 mg  250 mg Oral BID       Allergies   Allergen Reactions    Sulfa Antibiotics Anaphylaxis    Formaldehyde     Codeine Palpitations           Objective     Blood pressure 106/53, pulse 71, temperature 98 5 °F (36 9 °C), temperature source Oral, resp  rate 18, height 4' 10" (1 473 m), weight 66 7 kg (147 lb 0 8 oz), SpO2 97 %, not currently breastfeeding  Body mass index is 30 73 kg/m²  Intake/Output Summary (Last 24 hours) at 1/24/2021 1505  Last data filed at 1/24/2021 1348  Gross per 24 hour   Intake 400 ml   Output 675 ml   Net -275 ml         PHYSICAL EXAM:      General Appearance:   Alert, cooperative, no distress   HEENT:   Normocephalic, atraumatic, anicteric      Neck:  Supple, symmetrical, trachea midline   Lungs:   Clear to auscultation bilaterally; no rales, rhonchi or wheezing; respirations unlabored    Heart[de-identified]   Regular rate and rhythm; no murmur, rub, or gallop     Abdomen:   Soft, non-tender, non-distended; normal bowel sounds; no masses, no organomegaly    Genitalia:   Deferred    Rectal:   Deferred    Extremities:  No cyanosis, clubbing or edema    Pulses:  2+ and symmetric all extremities    Skin:  No jaundice, rashes, or lesions    Lymph nodes:  No palpable cervical lymphadenopathy        Lab Results:   Admission on 01/21/2021   Component Date Value    Color, UA 01/22/2021 Dk Yellow     Clarity, UA 01/22/2021 Cloudy     Specific Gravity, UA 01/22/2021 1 025     pH, UA 01/22/2021 5 5     Leukocytes, UA 01/22/2021 Small*    Nitrite, UA 01/22/2021 Positive*    Protein, UA 01/22/2021 30 (1+)*    Glucose, UA 01/22/2021 100 (1/10%)*    Ketones, UA 01/22/2021 Trace*    Urobilinogen, UA 01/22/2021 0 2     Bilirubin, UA 01/22/2021 Interference- unable to analyze*    Blood, UA 01/22/2021 Negative     RBC, UA 01/22/2021 20-30*    WBC, UA 01/22/2021 10-20*    Epithelial Cells 01/22/2021 None Seen     Bacteria, UA 01/22/2021 None Seen     Hyaline Casts, UA 01/22/2021 10-25*    Urine Culture 01/22/2021 Culture too young- will reincubate     Sodium 01/23/2021 133*    Potassium 01/23/2021 3 6     Chloride 01/23/2021 101     CO2 01/23/2021 25     ANION GAP 01/23/2021 7     BUN 01/23/2021 5     Creatinine 01/23/2021 0 63     Glucose 01/23/2021 93     Calcium 01/23/2021 8 1*    eGFR 01/23/2021 84     Sodium 01/24/2021 131*    Potassium 01/24/2021 3 6     Chloride 01/24/2021 97*    CO2 01/24/2021 28     ANION GAP 01/24/2021 6     BUN 01/24/2021 6     Creatinine 01/24/2021 0 57*    Glucose 01/24/2021 89     Calcium 01/24/2021 7 8*    eGFR 01/24/2021 87        Imaging Studies: I have personally reviewed pertinent imaging studies

## 2021-01-24 NOTE — PROGRESS NOTES
01/24/21 0750   Pain Assessment   Pain Assessment Tool Pain Assessment not indicated - pt denies pain   Pain Score No Pain   Restrictions/Precautions   Precautions Aspiration;Bed/chair alarms;Cognitive; Fall Risk; Saeed; Pacemaker;Supervision on toilet/commode   Swallow Information   Current Risks for Dysphagia & Aspiration General debilitation;Cognitive deficit  (hx cog deficits, GERD, esophagitis)   Current Symptoms/Concerns Difficulty chewing;Decreased oral intake  (per nursing, pt having pill dysphagia this AM)   Current Diet Dysphagia advance; Thin liquid   Baseline Diet Dyphagia advanced;Regular; Thin liquids   Consistencies Assessed and Performance   Materials Admnistered Soft/Level 3;Regular/Solid; Thin liquid   Oral Stage Mild impaired   Phargngeal Stage Mild impaired;Aspiration risk   Swallow Mechanics Mild delayed;Swallow initation;Good Larygneal rise;Aspiration risk   Esophageal Concerns Hx GERDS   Recommendations   Risk for Aspiration   (low)   Recommendations Dysphagia treatment   Diet Solid Recommendation Level 3 Dysphagia/ advanced/ soft to chew   Diet Liquid Recommendation Thin liquid   Recommended Form of Meds As desired; As tolerated   General Precautions Aspiration precautions; Feed only when alert;Minimize distractions;Upright as possible for all oral intake;Remain upright for 45 mins after meals  (OOB/fully upright, assist with set up, distant sup)   Compensatory Swallowing Strategies Alternate solids and liquids; External pacing;Voluntary throat clear/cough to clear penetration   Results Reviewed with PT/Family/Caregiver;RN   Eating   Type of Assistance Needed Set-up / clean-up   Amount of Physical Assistance Provided No physical assistance   Eating CARE Score 5   Swallow Assessment   Swallow Treatment Assessment Pt seen for skilled dysphagia therapy session  Pt alert and interactive seen upright in bed in room   Prior to session, nurse Da reported pt with difficulty swallowing morning medications whole with water- coughing and spitting out pill  When speaking to pt, pt stating "I don't know I just couldn't do it"  Pt seen with breakfast meal of trial regular diet and thin liquids- items assessed included toasted english muffin, oatmeal, sliced pears and thin liquids  Pt required set up assistance with tray but no physical assistance self feeding  Meal completion 50% and 120cc liquids  Pt eating english muffin by bite, good oral retrieval and tear, slow oral processing, but relatively functional transfers and swallows  Pt eating bites of oatmeal inbetween- good bolus retrieval of puree, slow oral processing, again functional transfers and swallows  Pt tolerated whole pear slices- eating with fork and taking bites at a time  No overt s/s present with these food items however pt only eating about 50% of meal before deferring further trials  Pt drank thin liquids by cup and straw- good bolus retrieval, adequate oral control, no anterior loss, functional transfers and swallows  No overt s/s aspiration present  Discussed meds again with pt and stated "sometimes they give me trouble"  Spoke with nursing and reported to make sure its one at a time and to cut/crush larger pills  At this time, pt is tolerating current diet of dysphgaia level 3 and thin liquids and may benefit from further trials of regular diet textures- pt appears able to pick and choose items she may tolerate and leans towards softer or cut up items  Cont sessions to determine functional swallow status and upgrade/alter as able to ensure increased tolerance of LRD at this time  Swallow Assessment Prognosis   Prognosis Good   Prognosis Considerations Co-morbidities; Medical diagnosis; Medical prognosis;Participation level; Potential;Previous level of function;Severity of impairments;New learning ability;Ability to carry over; Cooperation   SLP Therapy Minutes   SLP Time In [id number]   SLP Time Out 0820   SLP Total Time (minutes) 30   SLP Mode of treatment - Individual (minutes) 30   SLP Mode of treatment - Concurrent (minutes) 0   SLP Mode of treatment - Group (minutes) 0   SLP Mode of treatment - Co-treat (minutes) 0   SLP Mode of Treatment - Total time(minutes) 30 minutes   SLP Cumulative Minutes 95   Therapy Time missed   Time missed?  No

## 2021-01-24 NOTE — PROGRESS NOTES
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 on 1/19  Course complicated by PNA which was treated with abx and colitis  ASSESSMENT: Stable      PLAN:    Rehabilitation   Continue current rehabilitation plan of care to maximize function  Currently Max - Total assist   Baseline cognitive deficits (per family)  SHE IS A POOR HISTORIAN   o Limited therapies due to continued loose stools, cannot get up out of bed without having loose stools exit  Medical issues   Urinary retention: +Saeed placed 1/23/21 due to persistent high straight cath volumes x 4  Urine Cx pending   Edema in legs: continue elevation and needs to increase her PO intake  Also on Lasix   Hyponatremia: 131, likely secondary to loose stools and poor oral intake   History of IBS and flares in past:  NO ABDOMINAL PAIN (prev history of ischemic colitis in 2019)  Current symptoms include: loose stools x 48 hours now  Previous C  Diff negative  Imodium scheduled 2mg TID (increased 1/24/21)  Changed diet to lo fiber, low residue  Consult placed to GI additionally as now a barrier to her participation in [address] [place] [address] IV site   PPM site stable   Afib: managed on xarelto for [facility]   Continue current medical plan of care  Appreciate IM consultants co-management  SUBJECTIVE: Patient seen today in her room  States she slept fairly  Has burning in her feet, but otherwise feels ok  She states she is annoyed with all her BMs        ROS:  A ten point review of systems was completed 1/24/21and pertinent positives are listed in subjective section  All other systems reviewed were negative         OBJECTIVE:   /55 (BP Location: Left arm) Comment (BP Location): per whit is ok to use this arm  Pulse 68   Temp 98 5 °F (36 9 °C) (Oral)   Resp 18   Ht 4' 10" (1 473 m)   Wt 66 2 kg (146 lb)   SpO2 91%   BMI 30 51 kg/m²     Physical Exam  Vitals signs and nursing note reviewed  Constitutional:       General: She is not in acute distress  Appearance: She is well-developed  HENT:      Head: Normocephalic  Nose: Nose normal    Eyes:      Conjunctiva/sclera: Conjunctivae normal    Neck:      Musculoskeletal: Neck supple  Cardiovascular:      Rate and Rhythm: Normal rate  Pulmonary:      Effort: Pulmonary effort is normal       Breath sounds: No wheezing  Abdominal:      General: There is no distension  Palpations: Abdomen is soft  Genitourinary:     Comments: +dominguez   Skin:     General: Skin is warm  Neurological:      Mental Status: She is alert        Comments: knowns her name and that she's in the hospital  Does not recall her situation  Motor: 3/5 x 4 extremities   Psychiatric:         Mood and Affect: Mood normal           Lab Results   Component Value Date    WBC 7 36 01/19/2021    HGB 11 0 (L) 01/19/2021    HCT 35 8 01/19/2021    MCV 98 01/19/2021     01/19/2021     Lab Results   Component Value Date    SODIUM 131 (L) 01/24/2021    K 3 6 01/24/2021    CL 97 (L) 01/24/2021    CO2 28 01/24/2021    BUN 6 01/24/2021    CREATININE 0 57 (L) 01/24/2021    GLUC 89 01/24/2021    CALCIUM 7 8 (L) 01/24/2021     Lab Results   Component Value Date    INR 0 91 01/11/2021    INR 0 91 01/11/2021    INR 0 87 01/07/2021    PROTIME 12 3 01/11/2021    PROTIME 12 4 01/11/2021    PROTIME 11 9 01/07/2021           Current Facility-Administered Medications:     acetaminophen (TYLENOL) tablet 650 mg, 650 mg, Oral, Q6H PRN, Bryan Sanchez MD    atorvastatin (LIPITOR) tablet 20 mg, 20 mg, Oral, Daily With Laura Rodriguez MD, 20 mg at 01/23/21 1725    furosemide (LASIX) tablet 20 mg, 20 mg, Oral, Daily With Lunch, Whit Diaz PA-C    furosemide (LASIX) tablet 40 mg, 40 mg, Oral, Daily, Renee Bradford MD, 40 mg at 01/24/21 0946    loperamide (IMODIUM) capsule 2 mg, 2 mg, Oral, TID PRN, Renee Bradford MD, 2 mg at 01/23/21 1354    loperamide (IMODIUM) capsule 2 mg, 2 mg, Oral, TID, Zaihda Nelson MD    losartan (COZAAR) tablet 25 mg, 25 mg, Oral, Daily, Renee Bradford MD, 25 mg at 01/24/21 0948    metoprolol succinate (TOPROL-XL) 24 hr tablet 50 mg, 50 mg, Oral, Daily, Renee Bradford MD, 50 mg at 01/24/21 0947    oxyCODONE (ROXICODONE) IR tablet 2 5 mg, 2 5 mg, Oral, Q4H PRN, Renee Bradford MD    prasugrel (EFFIENT) tablet 10 mg, 10 mg, Oral, Daily, Renee Bradford MD, 10 mg at 01/24/21 0948    ranolazine (RANEXA) 12 hr tablet 1,000 mg, 1,000 mg, Oral, Q12H, Renee Bradford MD, 1,000 mg at 01/24/21 0948    rivaroxaban (XARELTO) tablet 15 mg, 15 mg, Oral, Daily With Megan Watt MD, 15 mg at 01/23/21 1726    saccharomyces boulardii (FLORASTOR) capsule 250 mg, 250 mg, Oral, BID, Zahida Nelson MD, 250 mg at 01/24/21 3049      Past Medical History:   Diagnosis Date    Anal fissure     Cardiac disorder     Cognitive changes 12/23/2020    Esophageal reflux     Esophagitis, reflux     Hemorrhoids     Hepatic hemangioma     Last Assessed: 1/13/2015    Herpes zoster     History of colonic polyps     Hypertension     [facility]     Lumbar herniated disc     [facility]ignant [facility] without [facility] of site [facility]     Nephrolithiasis     L   Lithotripsy    Nontoxic single thyroid nodule     Last Assessed: 1/13/2015    Osteoarthritis     Overactive bladder     Raynaud disease     Respiratory system disease     Sjogren's disease ([facility]     Spinal stenosis     PONCHO (stress urinary incontinence, female)     Uterovaginal prolapse     Grade I-II       Patient Active Problem List    Diagnosis Date Noted    Edema of left upper extremity 01/22/2021    Urinary retention 01/22/2021    CAD (coronary artery disease) 01/21/2021    A-fib ([facility] 01/21/2021    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[facility] 01/21/2021    PAD (peripheral artery disease) ([facility] ) 01/21/2021    Dysphagia 01/21/2021    Elevated AST (SGOT) 01/21/2021    Hyponatremia 01/21/2021    Anemia 01/21/2021    CKD (chronic kidney disease) 01/21/2021    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

## 2021-01-24 NOTE — PROGRESS NOTES
Internal Medicine Progress Note  Patient: Marko Rosas  Age/sex: 79 y o  female  Medical Record #: [id number]      ASSESSMENT/PLAN: (Interval History)  Marko Rosas is seen and examined and management for following issues:    Acute/chronic systolic/diastolic CHF, LVEF 35%/BF 1 DD/moderate pulm HTN and mild-mod MR  · Acute CHF was felt to be 2/2 falsh pulm edema from coronary ischemia  · Continue Cozaar 25mg qd/Toprol  · Continue Lasix 60mg daily  · Mild hyponatremia - 131 today - will repeat in AM      NSTEMI with hx CAD/CABG; PTCA/LAMINE p LAD and ostial LMCA  · Grafts to D1 and OM1 were 100% occluded and not amenable to intervention  · Continue Ranexa/Effient/Lipitor and Toprol     New onset PAF  · New to Xarelto (price check = $100 00/mo and family OK with this)  · Continue Xarelto, Toprol     Significant conversion pause; s/p dual chamber PPM with left poseterior fascicle lead 1/19/21  · Continue pacer precautions  · Watch incision = currently dressing still on; remove 1/26     Enteritis/colitis  · Patient continues to have diarrhea  · PMR increased imodium to 3x daily and consulted GI  · C diff negative  · Monitor electrolytes  Aspiration PNA  · S/p antibiotic  · Resolved     Dysphagia  · Continue Dysphagia level 3/thins  · ST to see     Confusion  · Per PMR, getting UA/CX     Urine retention  · Has had large volumes with straight catheterization  · PMR checked UA - small leuks, positive nitrite - Culture pending  · Saeed placed  LUE edema  · Per PMR      The above assessment and plan was reviewed and updated as determined by my evaluation of the patient on 1/24/2021      Labs:   Results from last 7 days   Lab Units 01/19/21  0546 01/18/21  0823   WBC Thousand/uL 7 36 10 07   HEMOGLOBIN g/dL 11 0* 10 8*   HEMATOCRIT % 35 8 33 4*   PLATELETS Thousands/uL 217 224     Results from last 7 days   Lab Units 01/24/21  0501 01/23/21  0642   SODIUM mmol/L 131* 133*   POTASSIUM mmol/L 3 6 3 6   CHLORIDE mmol/L 97* 101   CO2 mmol/L 28 25   BUN mg/dL 6 5   CREATININE mg/dL 0 57* 0 63   CALCIUM mg/dL 7 8* 8 1*                   Review of Scheduled Meds:  Current Facility-Administered Medications   Medication Dose Route Frequency Provider Last Rate    acetaminophen  650 mg Oral Q6H PRN Ben Van MD      atorvastatin  20 mg Oral Daily With Mauriizo Newman MD      furosemide  20 mg Oral Daily With Lunch Lucina Diaz PA-C      furosemide  40 mg Oral Daily Spanish , MD      lidocaine  1 application Urethral B1H PRN Ben Van MD      loperamide  2 mg Oral TID PRN Dorian Larry MD      loperamide  2 mg Oral TID Ben Van MD      losartan  25 mg Oral Daily Spanish , MD      metoprolol succinate  50 mg Oral Daily Spanish , MD      oxyCODONE  2 5 mg Oral Q4H PRN Dorian Larry MD      prasugrel  10 mg Oral Daily Spanish , MD      ranolazine  1,000 mg Oral Q12H Spanish , MD      rivaroxaban  15 mg Oral Daily With Maurizio Newman MD      saccharomyces boulardii  250 mg Oral BID Ben Van MD         Subjective/ HPI: Patient seen and examined  Patients overnight issues or events were reviewed with nursing or staff during rounds or morning huddle session  New or overnight issues include the following:     Pt continues to have diarrhea  She reports some discomfort around the Saeed site  She denies any other complaints  ROS:   A 10 point ROS was performed; negative except as noted above         Imaging:     VAS upper limb venous duplex scan, unilateral/limited   Final Result by Jack Berger MD (01/22 2155)          *Labs /Radiology studies Reviewed  *Medications  reviewed and reconciled as needed  *Please refer to order section for additional ordered labs studies  *Case discussed with primary attending during morning huddle case rounds    Physical Examination:  Vitals:   Vitals:    01/24/21 0520 01/24/21 0739 01/24/21 0942 01/24/21 0947   BP: 110/60 102/60 120/55    BP Location: Right arm Right arm Left arm    Pulse: 73   68   Resp: 18      Temp: 98 5 °F (36 9 °C)      TempSrc: Oral      SpO2: 91%      Weight:       Height:           General Appearance: no distress, conversive  HEENT: PERRLA, conjuctiva normal; oropharynx clear; mucous membranes moist;   Neck:  Supple, no lymphadenopathy or thyromegaly  Lungs: CTA, normal respiratory effort, no retractions, expiratory effort normal  CV: regular rate and rhythm , PMI normal   ABD: soft non tender, no masses , no hepatic or splenomegaly  EXT: DP pulses intact, no lymphadenopathy, + bilat LE edema to lateral thighs  Skin: normal turgor, normal texture, no rash  Psych: affect normal, mood normal  Neuro: AAOx3      The above physical exam was reviewed and updated as determined by my evaluation of the patient on 1/24/2021  Invasive Devices     Peripheral Intravenous Line            Peripheral IV 01/19/21 Left;Ventral (anterior) Forearm 4 days          Drain            Urethral Catheter Other (Comment) 16 Fr  less than 1 day                   VTE Pharmacologic Prophylaxis: Xarelto  Code Status: Level 1 - Full Code  Current Length of Stay: 3 day(s)      Total time spent:  30 minutes with more than 50% spent counseling/coordinating care  Counseling includes discussion with patient re: progress  and discussion with patient of his/her current medical state/information  Coordination of patient's care was performed in conjunction with primary service  Time invested included review of patient's labs, vitals, and management of their comorbidities with continued monitoring  In addition, this patient was discussed with medical team including physician and advanced extenders  The care of the patient was extensively discussed and appropriate treatment plan was formulated unique for this patient  ** Please Note:  voice to text software may have been used in the creation of this document   Although proof errors in transcription or interpretation are a potential of such software**

## 2021-01-24 NOTE — PROGRESS NOTES
01/24/21 1330   Pain Assessment   Pain Assessment Tool 0-10   Pain Score 4   Pain Location/Orientation Other (Comment)  (urethral pain )   Pain Onset/Description Onset: Ongoing; Descriptor: Burning   Hospital Pain Intervention(s) Rest;Repositioned   Restrictions/Precautions   Precautions Aspiration;Bed/chair alarms;Cognitive; Fall Risk;Supervision on toilet/commode;Pain; Saeed; Pacemaker  (Level 3 with thins)   LUE ROM Restriction Range Limitation  (pacer precautions)   Cognition   Overall Cognitive Status Impaired   Arousal/Participation Alert; Cooperative   Attention Attends with cues to redirect   Orientation Level Disoriented to place; Disoriented to situation;Oriented to person;Oriented to time   Memory Decreased short term memory;Decreased recall of recent events;Decreased recall of precautions   Following Commands Follows one step commands with increased time or repetition   Subjective   Subjective pt reported urethral burning pain with a scale of 4/10 and also c/o fatigue but was agreeable to participate in therapy  Roll Left and Right   Type of Assistance Needed Physical assistance;Verbal cues; Adaptive equipment   Amount of Physical Assistance Provided 75% or more   Comment mod of 1 to R, max of 1 to left with rail    Roll Left and Right CARE Score 2   Sit to Lying   Type of Assistance Needed Physical assistance;Verbal cues; Adaptive equipment   Amount of Physical Assistance Provided 50%-74%   Comment flat bed with rail    Sit to Lying CARE Score 2   Lying to Sitting on Side of Bed   Type of Assistance Needed Physical assistance;Verbal cues; Adaptive equipment   Amount of Physical Assistance Provided 50%-74%   Comment log roll technique with bedrail    Lying to Sitting on Side of Bed CARE Score 2   Sit to Stand   Type of Assistance Needed Physical assistance;Verbal cues; Adaptive equipment   Amount of Physical Assistance Provided 50%-74%   Comment mod-min with VC for L hand on walker handle and R UE pushing off w/c    Sit to Stand CARE Score 2   Bed-Chair Transfer   Type of Assistance Needed Physical assistance;Verbal cues; Adaptive equipment   Amount of Physical Assistance Provided 50%-74%   Comment mod of 1 sit pivot to R, mod of 1 SPT with RW  as pt pivoted back to bed from w/c using a RW she reported she felt bowel coming out and was assisted back to bed   PT notified RN Antoine and Barbaraaria Money pt needs assist to clean up as PT unable to stay for I have another pt to see  this incident happend after pt just talked to gastro MD wherein  she denied having any bowel movement today as she felt BM is getting better   Chair/Bed-to-Chair Transfer CARE Score 2   Car Transfer   Type of Assistance Needed Physical assistance;Verbal cues; Adaptive equipment   Amount of Physical Assistance Provided 50%-74%   Comment mod of 1 with RW    Car Transfer CARE Score 2   Walk 10 Feet   Type of Assistance Needed Physical assistance;Verbal cues; Adaptive equipment   Amount of Physical Assistance Provided 50%-74%   Comment mod-min of 1 with RW x 10'    Walk 10 Feet CARE Score 2   Walk 50 Feet with Two Turns   Type of Assistance Needed Physical assistance;Verbal cues; Adaptive equipment   Amount of Physical Assistance Provided Total assistance   Comment  mod-min of 1 with SBA /CF of 2nd person for safety x 40',41,52 with RW    Walk 50 Feet with Two Turns CARE Score 1   Walking 10 Feet on Uneven Surfaces   Type of Assistance Needed Physical assistance;Verbal cues; Adaptive equipment   Amount of Physical Assistance Provided 50%-74%   Comment min-mod of 1 on floor mat with RW   Walking 10 Feet on Uneven Surfaces CARE Score 2   Curb or Single Stair   Style negotiated Single stair   Type of Assistance Needed Physical assistance;Verbal cues; Adaptive equipment   Amount of Physical Assistance Provided 75% or more   Comment mod -max ascending fwd facing 6"  x 2 with R rail and HHA on left  but switched to sidestepping down with both hands on the R rail requiring min A only    1 Step (Curb) CARE Score 2   Picking Up Object   Type of Assistance Needed Physical assistance;Verbal cues; Adaptive equipment   Amount of Physical Assistance Provided 25%-49%   Comment min of 1 using reacher and walker support picked up marker off the floor   Picking Up Object CARE Score 3   Assessment   Treatment Assessment Skilled PT focused on functional mobility training with care score baseline assessment  Pt cont to be confuse with tendency to perseverate re: ongoing urethral pain and request for antibiotic  Also required reminder that she has a dominguez as whenever she feels the urge to urinate she would ask to use the bathroom  Pt also had a bowel incontinent episode which happened at end of tx session with nursing notified to assist with clean up  As for mobilities pt demonstrated improve participation and tolerance this session although still required longer rest breaks in between tasks due to noted and reported fatigue  C/o lighheadedness only after initial supine to sit transfer with manual BP taken by RN at 118/52  See above info for details of functional mobility performances  Pt will benefit from continued skilled PT intervention which will focus on strengthening, balance training and functional mobility /tolerance training to inc overall functional indep  Barriers to Discharge Inaccessible home environment;Decreased caregiver support   Barriers to Discharge Comments lives with  , 2STE with IZABELA quinones  has 1 local dtr name Yvette Nice    PT Barriers   Functional Limitation Car transfers; Ramp negotiation;Stair negotiation;Standing;Transfers; Walking   Plan   Treatment/Interventions Functional transfer training; Therapeutic exercise; Endurance training;Bed mobility;Gait training;Spoke to nursing;OT   Progress Progressing toward goals   Recommendation   PT Discharge Recommendation Home with skilled therapy   Equipment Recommended Fatuma Del Toro  (has a walker at home from her sister)   PT - OK to Discharge No   PT Therapy Minutes   PT Time In 1330   PT Time Out 1500   PT Total Time (minutes) 90   PT Mode of treatment - Individual (minutes) 90   PT Mode of treatment - Concurrent (minutes) 0   PT Mode of treatment - Group (minutes) 0   PT Mode of treatment - Co-treat (minutes) 0   PT Mode of Treatment - Total time(minutes) 90 minutes   PT Cumulative Minutes 180   Therapy Time missed   Time missed?  No

## 2021-01-24 NOTE — QUICK NOTE
Personally, updated her daughter Stella Silver today regarding her medical updates with her understanding  Olamide from OT spoke to Stella Silver as well for a functional status update  Dr Julian Darling to follow up with her again in 1-2 days         Zahida Nelson MD  PM&R

## 2021-01-25 PROBLEM — R19.7 DIARRHEA: Status: ACTIVE | Noted: 2021-01-25

## 2021-01-25 PROBLEM — E87.6 HYPOKALEMIA: Status: ACTIVE | Noted: 2021-01-25

## 2021-01-25 LAB
ANION GAP SERPL CALCULATED.3IONS-SCNC: 5 MMOL/L (ref 4–13)
BASOPHILS # BLD AUTO: 0.02 THOUSANDS/ΜL (ref 0–0.1)
BASOPHILS # BLD AUTO: 0.03 THOUSANDS/ΜL (ref 0–0.1)
BASOPHILS NFR BLD AUTO: 0 % (ref 0–1)
BASOPHILS NFR BLD AUTO: 1 % (ref 0–1)
BUN SERPL-MCNC: 6 MG/DL (ref 5–25)
CALCIUM SERPL-MCNC: 8.4 MG/DL (ref 8.3–10.1)
CHLORIDE SERPL-SCNC: 97 MMOL/L (ref 100–108)
CO2 SERPL-SCNC: 30 MMOL/L (ref 21–32)
CREAT SERPL-MCNC: 0.59 MG/DL (ref 0.6–1.3)
EOSINOPHIL # BLD AUTO: 0.06 THOUSAND/ΜL (ref 0–0.61)
EOSINOPHIL # BLD AUTO: 0.07 THOUSAND/ΜL (ref 0–0.61)
EOSINOPHIL NFR BLD AUTO: 1 % (ref 0–6)
EOSINOPHIL NFR BLD AUTO: 1 % (ref 0–6)
ERYTHROCYTE [DISTWIDTH] IN BLOOD BY AUTOMATED COUNT: 15.1 % (ref 11.6–15.1)
ERYTHROCYTE [DISTWIDTH] IN BLOOD BY AUTOMATED COUNT: 15.3 % (ref 11.6–15.1)
GFR SERPL CREATININE-BSD FRML MDRD: 86 ML/MIN/1.73SQ M
GLUCOSE SERPL-MCNC: 94 MG/DL (ref 65–140)
HCT VFR BLD AUTO: 25 % (ref 34.8–46.1)
HCT VFR BLD AUTO: 26.7 % (ref 34.8–46.1)
HEMOCCULT STL QL: ABNORMAL
HEMOCCULT STL QL: ABNORMAL
HEMOCCULT STL QL: NEGATIVE
HEMOCCULT STL QL: NEGATIVE
HEMOCCULT STL QL: NORMAL
HEMOCCULT STL QL: POSITIVE
HGB BLD-MCNC: 8 G/DL (ref 11.5–15.4)
HGB BLD-MCNC: 8.6 G/DL (ref 11.5–15.4)
IMM GRANULOCYTES # BLD AUTO: 0.09 THOUSAND/UL (ref 0–0.2)
IMM GRANULOCYTES # BLD AUTO: 0.09 THOUSAND/UL (ref 0–0.2)
IMM GRANULOCYTES NFR BLD AUTO: 2 % (ref 0–2)
IMM GRANULOCYTES NFR BLD AUTO: 2 % (ref 0–2)
LYMPHOCYTES # BLD AUTO: 0.72 THOUSANDS/ΜL (ref 0.6–4.47)
LYMPHOCYTES # BLD AUTO: 0.8 THOUSANDS/ΜL (ref 0.6–4.47)
LYMPHOCYTES NFR BLD AUTO: 13 % (ref 14–44)
LYMPHOCYTES NFR BLD AUTO: 13 % (ref 14–44)
MCH RBC QN AUTO: 30.4 PG (ref 26.8–34.3)
MCH RBC QN AUTO: 30.9 PG (ref 26.8–34.3)
MCHC RBC AUTO-ENTMCNC: 32 G/DL (ref 31.4–37.4)
MCHC RBC AUTO-ENTMCNC: 32.2 G/DL (ref 31.4–37.4)
MCV RBC AUTO: 95 FL (ref 82–98)
MCV RBC AUTO: 96 FL (ref 82–98)
MONOCYTES # BLD AUTO: 0.72 THOUSAND/ΜL (ref 0.17–1.22)
MONOCYTES # BLD AUTO: 0.82 THOUSAND/ΜL (ref 0.17–1.22)
MONOCYTES NFR BLD AUTO: 13 % (ref 4–12)
MONOCYTES NFR BLD AUTO: 14 % (ref 4–12)
NEUTROPHILS # BLD AUTO: 3.85 THOUSANDS/ΜL (ref 1.85–7.62)
NEUTROPHILS # BLD AUTO: 4.24 THOUSANDS/ΜL (ref 1.85–7.62)
NEUTS SEG NFR BLD AUTO: 69 % (ref 43–75)
NEUTS SEG NFR BLD AUTO: 71 % (ref 43–75)
NRBC BLD AUTO-RTO: 0 /100 WBCS
NRBC BLD AUTO-RTO: 0 /100 WBCS
PLATELET # BLD AUTO: 219 THOUSANDS/UL (ref 149–390)
PLATELET # BLD AUTO: 245 THOUSANDS/UL (ref 149–390)
PMV BLD AUTO: 10.4 FL (ref 8.9–12.7)
PMV BLD AUTO: 10.5 FL (ref 8.9–12.7)
POTASSIUM SERPL-SCNC: 3.3 MMOL/L (ref 3.5–5.3)
RBC # BLD AUTO: 2.63 MILLION/UL (ref 3.81–5.12)
RBC # BLD AUTO: 2.78 MILLION/UL (ref 3.81–5.12)
SODIUM SERPL-SCNC: 132 MMOL/L (ref 136–145)
WBC # BLD AUTO: 5.46 THOUSAND/UL (ref 4.31–10.16)
WBC # BLD AUTO: 6.05 THOUSAND/UL (ref 4.31–10.16)

## 2021-01-25 PROCEDURE — 97535 SELF CARE MNGMENT TRAINING: CPT

## 2021-01-25 PROCEDURE — 97530 THERAPEUTIC ACTIVITIES: CPT

## 2021-01-25 PROCEDURE — 99232 SBSQ HOSP IP/OBS MODERATE 35: CPT | Performed by: INTERNAL MEDICINE

## 2021-01-25 PROCEDURE — 85025 COMPLETE CBC W/AUTO DIFF WBC: CPT | Performed by: PHYSICIAN ASSISTANT

## 2021-01-25 PROCEDURE — 85025 COMPLETE CBC W/AUTO DIFF WBC: CPT | Performed by: NURSE PRACTITIONER

## 2021-01-25 PROCEDURE — 92526 ORAL FUNCTION THERAPY: CPT

## 2021-01-25 PROCEDURE — 80048 BASIC METABOLIC PNL TOTAL CA: CPT | Performed by: PHYSICIAN ASSISTANT

## 2021-01-25 PROCEDURE — 99232 SBSQ HOSP IP/OBS MODERATE 35: CPT | Performed by: PHYSICAL MEDICINE & REHABILITATION

## 2021-01-25 PROCEDURE — 82272 OCCULT BLD FECES 1-3 TESTS: CPT | Performed by: PHYSICAL MEDICINE & REHABILITATION

## 2021-01-25 RX ORDER — ONDANSETRON 4 MG/1
4 TABLET, ORALLY DISINTEGRATING ORAL EVERY 6 HOURS PRN
Status: DISCONTINUED | OUTPATIENT
Start: 2021-01-25 | End: 2021-01-28

## 2021-01-25 RX ORDER — POTASSIUM CHLORIDE 20 MEQ/1
20 TABLET, EXTENDED RELEASE ORAL ONCE
Status: COMPLETED | OUTPATIENT
Start: 2021-01-25 | End: 2021-01-25

## 2021-01-25 RX ORDER — PRASUGREL 10 MG/1
10 TABLET, FILM COATED ORAL DAILY
Status: DISCONTINUED | OUTPATIENT
Start: 2021-01-26 | End: 2021-01-29 | Stop reason: HOSPADM

## 2021-01-25 RX ADMIN — LOPERAMIDE HYDROCHLORIDE 2 MG: 2 CAPSULE ORAL at 09:32

## 2021-01-25 RX ADMIN — METOPROLOL SUCCINATE 50 MG: 50 TABLET, EXTENDED RELEASE ORAL at 09:32

## 2021-01-25 RX ADMIN — ATORVASTATIN CALCIUM 20 MG: 20 TABLET, FILM COATED ORAL at 16:20

## 2021-01-25 RX ADMIN — LOPERAMIDE HYDROCHLORIDE 2 MG: 2 CAPSULE ORAL at 16:20

## 2021-01-25 RX ADMIN — LOSARTAN POTASSIUM 25 MG: 25 TABLET, FILM COATED ORAL at 09:33

## 2021-01-25 RX ADMIN — Medication 250 MG: at 16:20

## 2021-01-25 RX ADMIN — RIVAROXABAN 15 MG: 15 TABLET, FILM COATED ORAL at 16:20

## 2021-01-25 RX ADMIN — POTASSIUM CHLORIDE 20 MEQ: 1500 TABLET, EXTENDED RELEASE ORAL at 09:33

## 2021-01-25 RX ADMIN — PRASUGREL HYDROCHLORIDE 10 MG: 10 TABLET, FILM COATED ORAL at 09:37

## 2021-01-25 RX ADMIN — LOPERAMIDE HYDROCHLORIDE 2 MG: 2 CAPSULE ORAL at 19:40

## 2021-01-25 RX ADMIN — ONDANSETRON 4 MG: 4 TABLET, ORALLY DISINTEGRATING ORAL at 11:34

## 2021-01-25 RX ADMIN — RANOLAZINE 1000 MG: 500 TABLET, FILM COATED, EXTENDED RELEASE ORAL at 09:36

## 2021-01-25 RX ADMIN — Medication 250 MG: at 09:32

## 2021-01-25 RX ADMIN — RANOLAZINE 1000 MG: 500 TABLET, FILM COATED, EXTENDED RELEASE ORAL at 20:24

## 2021-01-25 NOTE — PLAN OF CARE
Problem: Prexisting or High Potential for Compromised Skin Integrity  Goal: Skin integrity is maintained or improved  Description: INTERVENTIONS:  - Identify patients at risk for skin breakdown  - Assess and monitor skin integrity  - Assess and monitor nutrition and hydration status  - Monitor labs   - Assess for incontinence   - Turn and reposition patient  - Assist with mobility/ambulation  - Relieve pressure over bony prominences  - Avoid friction and shearing  - Provide appropriate hygiene as needed including keeping skin clean and dry  - Evaluate need for skin moisturizer/barrier cream  - Collaborate with interdisciplinary team   - Patient/family teaching  - Consider wound care consult   Outcome: Progressing     Problem: CARDIOVASCULAR - ADULT  Goal: Maintains optimal cardiac output and hemodynamic stability  Description: INTERVENTIONS:  - Monitor I/O, vital signs and rhythm  - Monitor for S/S and trends of decreased cardiac output  - Administer and titrate ordered vasoactive medications to optimize hemodynamic stability  - Assess quality of pulses, skin color and temperature  - Assess for signs of decreased coronary artery perfusion  - Instruct patient to report change in severity of symptoms  Outcome: Progressing  Goal: Absence of cardiac dysrhythmias or at baseline rhythm  Description: INTERVENTIONS:  - Continuous cardiac monitoring, vital signs, obtain 12 lead EKG if ordered  - Administer antiarrhythmic and heart rate control medications as ordered  - Monitor electrolytes and administer replacement therapy as ordered  Outcome: Progressing     Problem: METABOLIC, FLUID AND ELECTROLYTES - ADULT  Goal: Electrolytes maintained within normal limits  Description: INTERVENTIONS:  - Monitor labs and assess patient for signs and symptoms of electrolyte imbalances  - Administer electrolyte replacement as ordered  - Monitor response to electrolyte replacements, including repeat lab results as appropriate  - Instruct patient on fluid and nutrition as appropriate  Outcome: Progressing  Goal: Fluid balance maintained  Description: INTERVENTIONS:  - Monitor labs   - Monitor I/O and WT  - Instruct patient on fluid and nutrition as appropriate  - Assess for signs & symptoms of volume excess or deficit  Outcome: Progressing  Goal: Glucose maintained within target range  Description: INTERVENTIONS:  - Monitor Blood Glucose as ordered  - Assess for signs and symptoms of hyperglycemia and hypoglycemia  - Administer ordered medications to maintain glucose within target range  - Assess nutritional intake and initiate nutrition service referral as needed  Outcome: Progressing     Problem: SKIN/TISSUE INTEGRITY - ADULT  Goal: Skin integrity remains intact  Description: INTERVENTIONS  - Identify patients at risk for skin breakdown  - Assess and monitor skin integrity  - Assess and monitor nutrition and hydration status  - Monitor labs (i e  albumin)  - Assess for incontinence   - Turn and reposition patient  - Assist with mobility/ambulation  - Relieve pressure over bony prominences  - Avoid friction and shearing  - Provide appropriate hygiene as needed including keeping skin clean and dry  - Evaluate need for skin moisturizer/barrier cream  - Collaborate with interdisciplinary team (i e  Nutrition, Rehabilitation, etc )   - Patient/family teaching  Outcome: Progressing  Goal: Incision(s), wounds(s) or drain site(s) healing without S/S of infection  Description: INTERVENTIONS  - Assess and document risk factors for skin impairment   - Assess and document dressing, incision, wound bed, drain sites and surrounding tissue  - Consider nutrition services referral as needed  - Oral mucous membranes remain intact  - Provide patient/ family education  Outcome: Progressing  Goal: Oral mucous membranes remain intact  Description: INTERVENTIONS  - Assess oral mucosa and hygiene practices  - Implement preventative oral hygiene regimen  - Implement oral medicated treatments as ordered  - Initiate Nutrition services referral as needed  Outcome: Progressing     Problem: PAIN - ADULT  Goal: Verbalizes/displays adequate comfort level or baseline comfort level  Description: Interventions:  - Encourage patient to monitor pain and request assistance  - Assess pain using appropriate pain scale  - Administer analgesics based on type and severity of pain and evaluate response  - Implement non-pharmacological measures as appropriate and evaluate response  - Consider cultural and social influences on pain and pain management  - Notify physician/advanced practitioner if interventions unsuccessful or patient reports new pain  Outcome: Progressing     Problem: INFECTION - ADULT  Goal: Absence or prevention of progression during hospitalization  Description: INTERVENTIONS:  - Assess and monitor for signs and symptoms of infection  - Monitor lab/diagnostic results  - Monitor all insertion sites, i e  indwelling lines, tubes, and drains  - Monitor endotracheal if appropriate and nasal secretions for changes in amount and color  - [place] appropriate cooling/warming therapies per order  - Administer medications as ordered  - Instruct and encourage patient and family to use good hand hygiene technique  - Identify and instruct in appropriate isolation precautions for identified infection/condition  Outcome: Progressing     Problem: SAFETY ADULT  Goal: Patient will remain free of falls  Description: INTERVENTIONS:  - Assess patient frequently for physical needs  -  Identify cognitive and physical deficits and behaviors that affect risk of falls    -  [place] fall precautions as indicated by assessment   - Educate patient/family on patient safety including physical limitations  - Instruct patient to call for assistance with activity based on assessment  - Modify environment to reduce risk of injury  - Consider OT/PT consult to assist with strengthening/mobility  Outcome: Progressing  Goal: Maintain or return to baseline ADL function  Description: INTERVENTIONS:  -  Assess patient's ability to carry out ADLs; assess patient's baseline for ADL function and identify physical deficits which impact ability to perform ADLs (bathing, care of mouth/teeth, toileting, grooming, dressing, etc )  - Assess/evaluate cause of self-care deficits   - Assess range of motion  - Assess patient's mobility; develop plan if impaired  - Assess patient's need for assistive devices and provide as appropriate  - Encourage maximum independence but intervene and supervise when necessary  - Involve family in performance of ADLs  - Assess for home care needs following discharge   - Consider OT consult to assist with ADL evaluation and planning for discharge  - Provide patient education as appropriate  Outcome: Progressing  Goal: Maintain or return mobility status to optimal level  Description: INTERVENTIONS:  - Assess patient's baseline mobility status (ambulation, transfers, stairs, etc )    - Identify cognitive and physical deficits and behaviors that affect mobility  - Identify mobility aids required to assist with transfers and/or ambulation (gait belt, sit-to-stand, lift, walker, cane, etc )  - [place] fall precautions as indicated by assessment  - Record patient progress and toleration of activity level on Mobility SBAR; progress patient to next Phase/Stage  - Instruct patient to call for assistance with activity based on assessment  - Consider rehabilitation consult to assist with strengthening/weightbearing, etc   Outcome: Progressing     Problem: DISCHARGE PLANNING  Goal: Discharge to home or other facility with appropriate resources  Description: INTERVENTIONS:  - Identify barriers to discharge w/patient and caregiver  - Arrange for needed discharge resources and transportation as appropriate  - Identify discharge learning needs (meds, wound care, etc )  - Arrange for interpretive services to assist at discharge as needed  - Refer to Case Management Department for coordinating discharge planning if the patient needs post-hospital services based on physician/advanced practitioner order or complex needs related to functional status, cognitive ability, or social support system  Outcome: Progressing     Problem: Prexisting or High Potential for Compromised Skin Integrity  Goal: Skin integrity is maintained or improved  Description: INTERVENTIONS:  - Identify patients at risk for skin breakdown  - Assess and monitor skin integrity  - Assess and monitor nutrition and hydration status  - Monitor labs   - Assess for incontinence   - Turn and reposition patient  - Assist with mobility/ambulation  - Relieve pressure over bony prominences  - Avoid friction and shearing  - Provide appropriate hygiene as needed including keeping skin clean and dry  - Evaluate need for skin moisturizer/barrier cream  - Collaborate with interdisciplinary team   - Patient/family teaching  - Consider wound care consult   Outcome: Progressing     Problem: Potential for Falls  Goal: Patient will remain free of falls  Description: INTERVENTIONS:  - Assess patient frequently for physical needs  -  Identify cognitive and physical deficits and behaviors that affect risk of falls  -  [place] fall precautions as indicated by assessment   - Educate patient/family on patient safety including physical limitations  - Instruct patient to call for assistance with activity based on assessment  - Modify environment to reduce risk of injury  - Consider OT/PT consult to assist with strengthening/mobility  Outcome: Progressing     Problem: Nutrition/Hydration-ADULT  Goal: Nutrient/Hydration intake appropriate for improving, restoring or maintaining nutritional needs  Description: Monitor and assess patient's nutrition/hydration status for malnutrition  Collaborate with interdisciplinary team and initiate plan and interventions as ordered    Monitor patient's weight and dietary intake as ordered or per policy  Utilize nutrition screening tool and intervene as necessary  Determine patient's food preferences and provide high-protein, high-caloric foods as appropriate       INTERVENTIONS:  - Monitor oral intake, urinary output, labs, and treatment plans  - Assess nutrition and hydration status and recommend course of action  - Evaluate amount of meals eaten  - Assist patient with eating if necessary   - Allow adequate time for meals  - Recommend/ encourage appropriate diets, oral nutritional supplements, and vitamin/mineral supplements  - Order, calculate, and assess calorie counts as needed  - Recommend, monitor, and adjust tube feedings and TPN/PPN based on assessed needs  - Assess need for intravenous fluids  - Provide specific nutrition/hydration education as appropriate  - Include patient/family/caregiver in decisions related to nutrition  Outcome: Progressing

## 2021-01-25 NOTE — PROGRESS NOTES
01/25/21 1630   Pain Assessment   Pain Assessment Tool 0-10   Pain Score No Pain   Restrictions/Precautions   Precautions Aspiration;Bed/chair alarms;Cognitive; Fall Risk; Saeed; Supervision on toilet/commode;Pacemaker   Swallow Information   Current Risks for Dysphagia & Aspiration General debilitation;Cognitive deficit; Mental status change;Reduced alertnes  (hx cog deficit, GERD, esophagitis)   Current Symptoms/Concerns Difficulty chewing;Holding food in mouth;Decreased oral intake   Current Diet Dysphagia advance; Thin liquid   Baseline Diet Dyphagia advanced;Regular; Thin liquids   Consistencies Assessed and Performance   Materials Admnistered Regular/Solid; Thin liquid   Oral Stage Mild impaired; Moderate impaired   Phargngeal Stage Mild impaired;Aspiration risk   Swallow Mechanics Mild delayed; Moderate delayed;Swallow initation;Good Larygneal rise;Aspiration risk   Esophageal Concerns Hx GERDS   Recommendations   Risk for Aspiration   (low)   Recommendations Dysphagia treatment   Diet Solid Recommendation Level 3 Dysphagia/ advanced/ soft to chew   Diet Liquid Recommendation Thin liquid   Recommended Form of Meds As desired; As tolerated   General Precautions Aspiration precautions; Feed only when alert;Minimize distractions;Upright as possible for all oral intake;Remain upright for 45 mins after meals; Supervision with meals  (OOB/fully upright, set up A, dist vs FULL sup if needed)   Compensatory Swallowing Strategies Alternate solids and liquids; External pacing;Voluntary throat clear/cough to clear penetration   Results Reviewed with RN   Eating   Type of Assistance Needed Set-up / clean-up;Supervision;Verbal cues   Amount of Physical Assistance Provided No physical assistance   Eating CARE Score 4   Swallow Assessment   Swallow Treatment Assessment Pt seen during dinner meal for continued dysphagia therapy as pt remains on a dysphagia level 3 diet and thin liquids, but reports tolerating a regular diet at home  Pt seated fully upright and OOB for meal  Required set up assist but able to self feed when given increased cuing to initiate meal  Presented w/ regular diet tray consisting of roast beef, roasted potatoes, spinach w/ mushrooms, and tossed salad  Pt consumed <25% of meal and ~120cc thin liquids by straw sips  Of note, pt appeared more confused and fatigued this meal and required consistent verbal cues to initiate meal and continue meal, despite verbalizing a desire to eat  When pt cued for intake, pt often stated "I will" or "I'll get there " Pt also verbalizing fatigue during this time  Functional bolus retrieval w/o anterior loss  Mastication appeared slower but effective for breakdown  Suspecting due to fatigue, pt presented w/ decreased bolus formation resulting in mild diffuse oral residual and occasional mild-moderate pocketing on L side  Pt required verbal cues to fully clear in utilizing significantly increased time and liquid wash  Overall oral processing became more prolonged as meal progressed w/ transfers and swallow initiation also appearing to be slower on this date  No overt s/s penetration/aspiration noted  Towards end of meal, pt closing her eyes while chewing and appeared to fall asleep between bites  Initially, SLP cuing pt to stop meal but pt declining  Eventually, SLP determined that pt was not appropriate for continued PO intake at this specific time  As pt's cognition, as well as fatigue appeared to fluctuate and be increased this meal, pt is not appropriate for a diet upgrade at this time  Will continue to recommend dysphagia level 3 and thin liquids at this time  Additional recommendations include: OOB for meals, assistance provided w/ tray set up and alternating solids/liquids, distant supervision but may now benefit from FULL supervision due to changes in MARCUS, cognition, fatigue   SLP spoke w/ RN regarding concern for pt's change in MARCUS and fatigue-also recommended pt to have FULL supervision for breakfast if she remains in this state  SLP to follow up w/ pt tomorrow to ensure safety of PO intake  Pt is recommended for continued skilled skilled ST services for dysphagia therapy to maximize swallow function while safely supporting PO intake in assisting in ensuring safest/least restrictive diet at this time  Swallow Assessment Prognosis   Prognosis Fair   Prognosis Considerations Co-morbidities; Medical diagnosis; Medical prognosis; Severity of impairments;New learning ability;Ability to carry over  (pt w/ decreased MARCUS, increased fatigue this session)   SLP Therapy Minutes   SLP Time In 36   SLP Time Out 1715   SLP Total Time (minutes) 45   SLP Mode of treatment - Individual (minutes) 45   SLP Mode of treatment - Concurrent (minutes) 0   SLP Mode of treatment - Group (minutes) 0   SLP Mode of treatment - Co-treat (minutes) 0   SLP Mode of Treatment - Total time(minutes) 45 minutes   SLP Cumulative Minutes 140   Therapy Time missed   Time missed?  No

## 2021-01-25 NOTE — PROGRESS NOTES
Internal Medicine Progress Note  Patient: Freddie Huggins  Age/sex: 79 y o  female  Medical Record #: [id number]      ASSESSMENT/PLAN: (Interval History)  Freddie Huggins is seen and examined and management for following issues:    Acute/chronic systolic/diastolic CHF, LVEF 81%/GS 1 DD/moderate pulm HTN and mild-mod MR  · Acute CHF was felt to be 2/2 falsh pulm edema from coronary ischemia  · Continue Cozaar 25mg qd/Toprol  · on Lasix 60mg daily = held today 2/2 so much diarrhea     NSTEMI with hx CAD/CABG; PTCA/LAMINE p LAD and ostial LMCA  · Grafts to D1 and OM1 were 100% occluded and not amenable to intervention  · Continue Ranexa/Effient/Lipitor and Toprol     New onset PAF  · New to Xarelto (price check = $100 00/mo and family OK with this)  · Continue Xarelto, Toprol     Significant conversion pause; s/p dual chamber PPM with left poseterior fascicle lead 1/19/21  · Continue pacer precautions  · Watch incision = currently dressing still on; remove 1/26     Enteritis/colitis  · Has hx ischemic colitis 2019/2017/2016 with rectal bleeding and has  celiac/JETHRO stenoses  · Patient continues to have diarrhea  · PMR increased Imodium to TID  · GI saw and want to continue Imodium scheduled but if sx worsen then possible EGD/c-scope  · C diff negative      Aspiration PNA  · S/p antibiotic  · Resolved     Dysphagia  · Continue Dysphagia level 3/thins  · ST following     Urine retention  · Had large volumes with straight catheterization and dominguez placed    · PMR checked UA - small leuks, positive nitrite = Cx with 60,000-69,000 Candida  · No fever/leukocytosis therefore observe for now      LUE edema  · Venous doppler was negative for DVT     Hyponatremia  · Likely 2/2 loss in stool  · Will recheck in AM     Acute anemia  · Usually runs hemoglobin of @11 but this AM is down to 8  · No reported blood in stool  · Repeat was 8 6  · For stool hemoccults but stools up to this point have been brown  · Denies any abdominal pain and was not tender on palpation  · Will d/w Dr Violet Brewer    Nausea/vomiting  · Late this AM, started with nausea and vomited small amt yellowish fluid  · Given Zofran 4mg with some relief = will continue 4mg q6hrs prn      The above assessment and plan was reviewed and updated as determined by my evaluation of the patient on 1/25/2021  Labs:   Results from last 7 days   Lab Units 01/25/21  0620 01/19/21  0546   WBC Thousand/uL 5 46 7 36   HEMOGLOBIN g/dL 8 0* 11 0*   HEMATOCRIT % 25 0* 35 8   PLATELETS Thousands/uL 219 217     Results from last 7 days   Lab Units 01/25/21  0620 01/24/21  0501   SODIUM mmol/L 132* 131*   POTASSIUM mmol/L 3 3* 3 6   CHLORIDE mmol/L 97* 97*   CO2 mmol/L 30 28   BUN mg/dL 6 6   CREATININE mg/dL 0 59* 0 57*   CALCIUM mg/dL 8 4 7 8*                   Review of Scheduled Meds:  Current Facility-Administered Medications   Medication Dose Route Frequency Provider Last Rate    acetaminophen  650 mg Oral Q6H PRN Veronica Magallanes MD      atorvastatin  20 mg Oral Daily With Dinner Cuco Fournier MD      lidocaine  1 application Urethral D2P PRN Veronica Magallanes MD      loperamide  2 mg Oral TID PRN Cuco Fournier MD      loperamide  2 mg Oral TID Veronica Magallanes MD      losartan  25 mg Oral Daily Cuco Fournier MD      metoprolol succinate  50 mg Oral Daily Cuco Fournier MD      oxyCODONE  2 5 mg Oral Q4H PRN Cuco Fournier MD      prasugrel  10 mg Oral Daily Cuco Fournier MD      ranolazine  1,000 mg Oral Q12H Cuco Fournier MD      rivaroxaban  15 mg Oral Daily With Mary More MD      saccharomyces boulardii  250 mg Oral BID Veronica Magallanes MD         Subjective/ HPI: Patients overnight issues or events were reviewed with nursing or staff during rounds or morning huddle session  No new or overnight issues  Offers no complaints  ROS:   A 10 point ROS was performed; negative except as noted above         Imaging:     VAS upper limb venous duplex scan, unilateral/limited   Final Result by Konrad Reddy MD (01/22 8822)          *Labs /Radiology studies reviewed  *Medications reviewed and reconciled as needed  *Please refer to order section for additional ordered labs studies  *Case discussed with primary attending during morning huddle case rounds      Physical Examination:  Vitals:   Vitals:    01/24/21 1300 01/24/21 2052 01/25/21 0608 01/25/21 0932   BP: 106/53 116/65 123/58 122/54   BP Location: Left arm Left arm Left arm Left arm   Pulse: 71 73 70    Resp: 18 16     Temp: 98 5 °F (36 9 °C) 98 9 °F (37 2 °C) 98 4 °F (36 9 °C)    TempSrc: Oral Oral Oral    SpO2: 97% 95% 95%    Weight: 66 7 kg (147 lb 0 8 oz)      Height:           General Appearance: no distress, conversive  HEENT: PERRLA, conjuctiva normal; oropharynx clear; mucous membranes moist   Neck:  Supple, normal ROM, no JVD  Lungs: CTA, normal respiratory effort, no retractions, expiratory effort normal  CV: regular rate and rhythm; no rubs/murmurs/gallops, PMI normal   ABD: soft; ND/NT; +BS  EXT: no edema  Skin: normal turgor, normal texture, no rashes  Psych: affect normal, mood normal  Neuro: AAO      The above physical exam was reviewed and updated as determined by my evaluation of the patient on 1/25/2021  Invasive Devices     Peripheral Intravenous Line            Peripheral IV 01/24/21 Right;Ventral (anterior) Forearm less than 1 day          Drain            Urethral Catheter Other (Comment) 16 Fr  1 day                   VTE Pharmacologic Prophylaxis: Xarelto  Code Status: Level 1 - Full Code  Current Length of Stay: 4 day(s)      Total time spent:  30 minutes with more than 50% spent counseling/coordinating care  Counseling includes discussion with patient re: progress  and discussion with patient of his/her current medical state/information  Coordination of patient's care was performed in conjunction with primary service   Time invested included review of patient's labs, vitals, and management of their comorbidities with continued monitoring  In addition, this patient was discussed with medical team including physician and advanced extenders  The care of the patient was extensively discussed and appropriate treatment plan was formulated unique for this patient  ** Please Note:  voice to text software may have been used in the creation of this document   Although proof errors in transcription or interpretation are a potential of such software**

## 2021-01-25 NOTE — ASSESSMENT & PLAN NOTE
- h/o IBS  - h/o ischemic colitis in 2019  - C diff negative on 1/18/21  - CT done in acute care suggestive of infectious colitis however this finding is no longer present on present on repeat CT A/P of 1/26  - GI consulted and feel this may be overflow diarrhea 2/2 to stool present in the bowel and GI has placed patient on bowel regimen and at this time have no plans for inpt endoscopic evaluation and recommend OP colonoscopy given h/o of ischemic colits in 2019

## 2021-01-25 NOTE — PROGRESS NOTES
Progress Note- Viji Isbell 79 y o  female MRN: [id number]    Unit/Bed#: -12 Encounter: [id number]    Assessment and Plan:  1  Diarrhea  - Stool frequency continues to decrease today  - Denies bloody stool, nausea, and vomiting  - Continue Imodium 2mg TID  -Hemoglobin noted at 8 0 this morning down from 11 0, 6 days prior    - STAT CBC shows Hgb 8 6   - FOBT ordered by primary team, will follow up  - CT Abdomen Pelvis w/ contrast from 1/13/21 showed likely infectious enteritis/colitis   - C  Diff negative 1/18/21  Was recently treated with oral vancomycin   - Continue supportive therapy and conservative management  If symptoms worsen then would proceed with further workup and possible EGD/Colonoscopy  2  Vomiting  -This afternoon patient had new onset vomiting  3-4 episodes of small quantity vomiting that was tan in color  No hematemesis  - Control nausea  - Supportive care and monitor labs      3  History of ischemic colitis  Patient was supposed to get colonoscopy after last episode of schema colitis in 2019  This can be done as an outpatient if patient would prefer to get it done  Given her cardiopulmonary comorbidities, even if colon cancer was found, she would not be a treatment candidate    ______________________________________________________________________    Subjective:   Sudhir Vitale was seen today and reports feeling good  She says that her diarrhea has "taken care of itself"  She denied bloody stool and abdominal pain,  She was unable to give a frequency or quality to her bowel habits overnight  Patient was previously having small quantity of stool on her bed multiple times yesterday  This has greatly improved today   She denied nausea and vomiting    Medication Administration - last 24 hours from 01/24/2021 1329 to 01/25/2021 1329       Date/Time Order Dose Route Action Action by     01/25/2021 0936 ranolazine (RANEXA) 12 hr tablet 1,000 mg 1,000 mg Oral Given Jesusita Bishop RN 01/24/2021 2126 ranolazine (RANEXA) 12 hr tablet 1,000 mg 1,000 mg Oral Given Ekaterina Pepe RN     01/24/2021 1656 rivaroxaban (XARELTO) tablet 15 mg 15 mg Oral Given Saul Hernandez RN     01/25/2021 0932 metoprolol succinate (TOPROL-XL) 24 hr tablet 50 mg 50 mg Oral Given Mitch Gastelum RN     01/25/2021 9665 prasugrel (EFFIENT) tablet 10 mg 10 mg Oral Given Mitch Gastelum RN     01/25/2021 0933 losartan (COZAAR) tablet 25 mg 25 mg Oral Given Mitch Gastelum RN     01/24/2021 1656 atorvastatin (LIPITOR) tablet 20 mg 20 mg Oral Given Saul Hernandez RN     01/25/2021 0932 saccharomyces boulardii (FLORASTOR) capsule 250 mg 250 mg Oral Given Mitch Gastelum RN     01/24/2021 1700 saccharomyces boulardii (FLORASTOR) capsule 250 mg 250 mg Oral Given Saul Hernandez RN     01/25/2021 0932 loperamide (IMODIUM) capsule 2 mg 2 mg Oral Given Mitch Gastelum RN     01/24/2021 2126 loperamide (IMODIUM) capsule 2 mg 2 mg Oral Given Ekaterina Pepe RN     01/24/2021 1656 loperamide (IMODIUM) capsule 2 mg 2 mg Oral Given Saul Hernandez RN     01/25/2021 0933 potassium chloride (K-DUR,KLOR-CON) CR tablet 20 mEq 20 mEq Oral Given Mitch Gastelum RN     01/25/2021 1134 ondansetron (ZOFRAN-ODT) dispersible tablet 4 mg 4 mg Oral Given Mitch Gastelum RN          Objective:     Vitals: Blood pressure 112/51, pulse 65, temperature 98 6 °F (37 °C), temperature source Oral, resp  rate 18, height 4' 10" (1 473 m), weight 66 7 kg (147 lb 0 8 oz), SpO2 91 %, not currently breastfeeding  ,Body mass index is 30 73 kg/m²  Intake/Output Summary (Last 24 hours) at 1/25/2021 1329  Last data filed at 1/25/2021 0900  Gross per 24 hour   Intake 120 ml   Output 425 ml   Net -305 ml       Physical Exam:   General Appearance:   Alert, cooperative, no distress   HEENT:   Normocephalic, atraumatic, anicteric       Neck:  Supple, symmetrical, trachea midline   Lungs:   Clear to auscultation bilaterally; no rales, rhonchi or wheezing; respirations unlabored    Heart[de-identified]   Regular rate and rhythm; no murmur, rub, or gallop  Abdomen:   Soft, non-tender, non-distended; normal bowel sounds; no masses, no organomegaly    Genitalia:   Deferred    Rectal:   Deferred    Extremities:  No cyanosis, clubbing or edema    Pulses:  2+ and symmetric all extremities    Skin:  No jaundice, rashes, or lesions    Lymph nodes:  No palpable cervical lymphadenopathy        Invasive Devices     Peripheral Intravenous Line            Peripheral IV 01/24/21 Right;Ventral (anterior) Forearm 1 day          Drain            Urethral Catheter Other (Comment) 16 Fr  1 day                Lab Results:  Admission on 01/21/2021   Component Date Value    Color, UA 01/22/2021 Dk Yellow     Clarity, UA 01/22/2021 Cloudy     Specific Gravity, UA 01/22/2021 1 025     pH, UA 01/22/2021 5 5     Leukocytes, UA 01/22/2021 Small*    Nitrite, UA 01/22/2021 Positive*    Protein, UA 01/22/2021 30 (1+)*    Glucose, UA 01/22/2021 100 (1/10%)*    Ketones, UA 01/22/2021 Trace*    Urobilinogen, UA 01/22/2021 0 2     Bilirubin, UA 01/22/2021 Interference- unable to analyze*    Blood, UA 01/22/2021 Negative     RBC, UA 01/22/2021 20-30*    WBC, UA 01/22/2021 10-20*    Epithelial Cells 01/22/2021 None Seen     Bacteria, UA 01/22/2021 None Seen     Hyaline Casts, UA 01/22/2021 10-25*    Urine Culture 01/22/2021 Culture results to follow       Urine Culture 01/22/2021 60,000-69,000 cfu/ml Candida albicans*    Sodium 01/23/2021 133*    Potassium 01/23/2021 3 6     Chloride 01/23/2021 101     CO2 01/23/2021 25     ANION GAP 01/23/2021 7     BUN 01/23/2021 5     Creatinine 01/23/2021 0 63     Glucose 01/23/2021 93     Calcium 01/23/2021 8 1*    eGFR 01/23/2021 84     Sodium 01/24/2021 131*    Potassium 01/24/2021 3 6     Chloride 01/24/2021 97*    CO2 01/24/2021 28     ANION GAP 01/24/2021 6     BUN 01/24/2021 6     Creatinine 01/24/2021 0 57*    Glucose 01/24/2021 89     Calcium 01/24/2021 7 8*    eGFR 01/24/2021 87     Sodium 01/25/2021 132*    Potassium 01/25/2021 3 3*    Chloride 01/25/2021 97*    CO2 01/25/2021 30     ANION GAP 01/25/2021 5     BUN 01/25/2021 6     Creatinine 01/25/2021 0 59*    Glucose 01/25/2021 94     Calcium 01/25/2021 8 4     eGFR 01/25/2021 86     WBC 01/25/2021 5 46     RBC 01/25/2021 2 63*    Hemoglobin 01/25/2021 8 0*    Hematocrit 01/25/2021 25 0*    MCV 01/25/2021 95     MCH 01/25/2021 30 4     MCHC 01/25/2021 32 0     RDW 01/25/2021 15 3*    MPV 01/25/2021 10 4     Platelets 26/35/3403 219     nRBC 01/25/2021 0     Neutrophils Relative 01/25/2021 71     Immat GRANS % 01/25/2021 2     Lymphocytes Relative 01/25/2021 13*    Monocytes Relative 01/25/2021 13*    Eosinophils Relative 01/25/2021 1     Basophils Relative 01/25/2021 0     Neutrophils Absolute 01/25/2021 3 85     Immature Grans Absolute 01/25/2021 0 09     Lymphocytes Absolute 01/25/2021 0 72     Monocytes Absolute 01/25/2021 0 72     Eosinophils Absolute 01/25/2021 0 06     Basophils Absolute 01/25/2021 0 02     WBC 01/25/2021 6 05     RBC 01/25/2021 2 78*    Hemoglobin 01/25/2021 8 6*    Hematocrit 01/25/2021 26 7*    MCV 01/25/2021 96     MCH 01/25/2021 30 9     MCHC 01/25/2021 32 2     RDW 01/25/2021 15 1     MPV 01/25/2021 10 5     Platelets 00/20/3452 245     nRBC 01/25/2021 0     Neutrophils Relative 01/25/2021 69     Immat GRANS % 01/25/2021 2     Lymphocytes Relative 01/25/2021 13*    Monocytes Relative 01/25/2021 14*    Eosinophils Relative 01/25/2021 1     Basophils Relative 01/25/2021 1     Neutrophils Absolute 01/25/2021 4 24     Immature Grans Absolute 01/25/2021 0 09     Lymphocytes Absolute 01/25/2021 0 80     Monocytes Absolute 01/25/2021 0 82     Eosinophils Absolute 01/25/2021 0 07     Basophils Absolute 01/25/2021 0 03        Imaging Studies: I have personally reviewed pertinent imaging studies  ---------------------------------------------------  Note Electronically Signed By:    Yvrose Kerns, DONNA-IV

## 2021-01-25 NOTE — PROGRESS NOTES
Physical Medicine and Rehabilitation Progress Note  Abby Hemphill 79 y o  female MRN: [id number]  Unit/Bed#: -60 Encounter: [id number]    HPI: Abby Hemphill is a 79 y o  female who presented to the [facility] with chest pain and elevated troponins and underwent cardiac cath and stent placement however later developed new onset a-fib and tachy-jillian syndrome requiring pacer placement on 1/19  Course complicated by PNA which was treated with abx and colitis  Chief Complaint: cardiac debility     Subjective: updated patient on active medical issues     ROS: A 10 point ROS was performed; negative except as noted above       Assessment/Plan:      A-fib [facility])  Assessment & Plan  - new onset in acute care   - at home on toprol XL (24 hr) 25 mg qd however was increased to 50 mg after prior hospitalization (IM managing BB)   - started on xarelto 15 mg qdinner in acute care per EP recommendations (CrCl was borderline or below cutoff for 20 mg qdinner dose in acute care therefore EP elected for lower 15 mg dose and while CrCl has been higher at times here in rehab unit will continue to use lower 15 mg dose as CrCl tends to fluctuate above and below cut off and over the last year appears to below cutoff more often than not)   - OP FU with Dr Dustin Mcelroy and Dev Gillespie (scheduled for 2/18)     CAD (coronary artery disease)  Assessment & Plan  - h/o CABG  - elevated troponins in acute care and patient is s/p LAMINE on 1/11/21  - at home was on zocor 40 mg qpm however was switched to lipitor 20 mg qpm after prior hospitalization   - home asa stopped in acute care per EP recommendations    - continued on home effient 10 mg qd per EP recommendations  - home ranexa increased from 500 mg q12 to 1000 mg q12 after prior hospitalization    - at home on toprol XL (24 hr) 25 mg qd however increased to 50 mg after prior hospitalization (IM managing BB dose while in rehab unit)   - OP FU with   Antonetta Aschoff and Baby Ego (scheduled for 2/18)           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)  Assessment & Plan  - s/p pacer on 1/19  - FU for device and incision check scheduled for 2/1  - OP FU with Dr Antonetta Aschoff and Inna Valiente (scheduled for 2/18)     Hypokalemia  Assessment & Plan  - K currently 3 3--> repletion given (in the setting of diarrhea, poor PO, and recent diuretic use which is currently on hold)   - IM managing     Urinary retention  Assessment & Plan  - urinary retention protocol   - UA completed however does not appear to indicate infxn, UCx Preliminary result grew 60-69 K cfu/ml of candida (however this could be a contaminant)  - dominguez placed on 1/23; plan for TOV on 1/28    Edema of left upper extremity  Assessment & Plan  - in the setting of recent pacer placement   - LUE negative for DVT, may be 2/2 to dependent edema in the setting or recent PPM placement      CKD (chronic kidney disease)  Assessment & Plan  - Cr currently 0 59  - baseline Cr appears to be 0 8-1 0  - IM monitoring     Anemia  Assessment & Plan  - Hg currently down to 8 0  - will check FOBT   - IM monitoring     Hyponatremia  Assessment & Plan  - now down to 132 in the setting of diarrhea, poor PO, & recent diuretic use (currently on hold)   - IM managing     Elevated AST (SGOT)  Assessment & Plan  - mildly elevated at 68 (ULN 45) and trending down  - recheck in AM  - IM monitoring     Dysphagia  Assessment & Plan  - mild at level 3 diet  - continue ST      Diarrhea  Assessment & Plan  - h/o IBS  - h/o ischemic colitis in 2019  - C diff negative on 1/18/21  - CT done in acute care suggestive of infectious colitis   - GI consulted and recommend continued use of imodium and no further inpt PAULSON at this time unless symptoms worsen and recommend OP colonoscopy given h/o of ischemic colits in 2019    PAD (peripheral artery disease) ([facility])  Assessment & Plan  - diffuse PAD with B/L LE occlusive disease  - chronic occlusion of Rt subclavian artery  - stenosis of L subclavian artery, L carotid artery, celiac artery, B/L renal arteries, SMA, JETHRO, L ALBERTO  - home asa stopped in acute care per EP recommendations  - home effient 10 mg qd continued per EP recommendations   - was on zocor 40 mg qpm previously however switched to lipitor 20 mg qpm after last hospitalization   - OP FU with Dr Kam Huggins hypertension  Assessment & Plan  - at home on regimen of norvsasc 5 mg qd however recently increased to 10 mg after prior hospitalization, imdur (24 hr) 60 mg qd however increased to 90 mg after prior hospitalization, toprol XL (24 hr) 25 mg qd however increased to 50 mg after prior hospitalization, and cozaar 50 mg qd (patient also appears to have been on torsemide 20 mg qd however patient states she takes a 1/2 tab qd and ultimately this appears to have been switched to lasix 40 mg after prior hospitalization)   - IM managing      Scheduled Meds:  Current Facility-Administered Medications   Medication Dose Route Frequency Provider Last Rate    acetaminophen  650 mg Oral Q6H PRN Carmelina Ogden MD      atorvastatin  20 mg Oral Daily With Rubens Stoddard MD      lidocaine  1 application Urethral Y5X PRN Carmelina Ogden MD      loperamide  2 mg Oral TID PRN Yousif Rich MD      loperamide  2 mg Oral TID Carmelina Ogden MD      losartan  25 mg Oral Daily Yousif Rich MD      metoprolol succinate  50 mg Oral Daily Alice De Los Santos MD      ondansetron  4 mg Oral Q6H PRN KATHY Ryder      oxyCODONE  2 5 mg Oral Q4H PRN Alice De Los Santos MD      prasugrel  10 mg Oral Daily Alice De Los Santos MD      ranolazine  1,000 mg Oral Q12H Alice De Los Santos MD      rivaroxaban  15 mg Oral Daily With Jenni Guillaume MD      saccharomyces boulardii  250 mg Oral BID Dori Byrne MD            Incidental findings:     1) abnormalities on echocardiogram while in acute care including but not limited to elevated peak PA pressure: OP FU with Dr Treva Silva and Carolee CHAVEZ     2) abnormalities on EKG while in acute care: in the setting of paced rhythm and patient was evaluated by cards with no further inpt PAULSON/intervention recommended; OP FU with Dr Treva Silva and Saira Cotton     3) low lipase level on 1/12: unclear clinic significance; OP FU with PCP with further testing/treatment and/or specialist referral at PCP's discretion      4) small hiatal hernia: asymptomatic; OP FU with PCP with further testing/treatment and/or specialist referral at PCP's discretion      5) gallstones w/distended gallbladder (no pericholecystatic inflammatory findings per imaging report): asymptomatic; OP FU with PCP with further testing/treatment and/or specialist referral at PCP's discretion      6) non-obstructive 1 mm L kidney stone: asymptomatic; OP FU with PCP with further testing/treatment and/or specialist referral at PCP's discretion      DVT ppx: xarelto           Objective:    Functional Update:  Mobility: max   Transfers: total   ADLs: total       Physical Exam:        General: alert, no apparent distress, cooperative and comfortable  HEENT:  Head: Normal, normocephalic, atraumatic    Eye: Normal external eye, conjunctiva, lidsc cornea  Ears: Normal external ears  Nose: Normal external nose, mucus membranes  CARDIAC:  +S1/2  LUNGS:  no abnormal respiratory pattern, no retractions noted, non-labored breathing ABDOMEN:  soft NT  EXTREMITIES:  no cyanosis  NEURO:  awake, alert   PSYCH:  mood/affect currently stable     Diagnostic Studies:  VAS upper limb venous duplex scan, unilateral/limited   Final Result by Maddie Asif MD (01/22 2155)          Laboratory:   Results from last 7 days   Lab Units 01/25/21  0620 01/19/21  0546   HEMOGLOBIN g/dL 8 0* 11 0*   HEMATOCRIT % 25 0* 35 8   WBC Thousand/uL 5 46 7 36     Results from last 7 days   Lab Units 01/25/21  0620 01/24/21  0501 01/23/21  0642   BUN mg/dL 6 6 5   SODIUM mmol/L 132* 131* 133*   POTASSIUM mmol/L 3 3* 3 6 3 6   CHLORIDE mmol/L 97* 97* 101   CREATININE mg/dL 0 59* 0 57* 0 63

## 2021-01-25 NOTE — PROGRESS NOTES
01/25/21 1230   Assessment   Treatment Assessment pt intially tried to be in seen at 1100 but nursing needed stat bloodwork and than pt began to vomit  attempted to be seen in afternoon, but no changes in medical status and pt put on med hold  Therapy Time missed   Time missed?  Yes   Amount of time missed 60   Reason for time missed Illness   Time(s) multiple attempts made 2

## 2021-01-25 NOTE — PROGRESS NOTES
01/25/21 0830   Pain Assessment   Pain Assessment Tool Pain Assessment not indicated - pt denies pain   Pain Score No Pain   Restrictions/Precautions   Precautions Aspiration;Bed/chair alarms;Cognitive; Fall Risk;Supervision on toilet/commode;Pain;Pacemaker; Saeed   Lifestyle   Autonomy "I understand why I need to be here but I think it's best if i recover at home"   Oral Hygiene   Type of Assistance Needed Supervision   Amount of Physical Assistance Provided No physical assistance   Comment seated at sink   Oral Hygiene CARE Score 4   Lying to Sitting on Side of Bed   Type of Assistance Needed Physical assistance   Amount of Physical Assistance Provided 50%-74%   Lying to Sitting on Side of Bed CARE Score 2   Sit to Stand   Type of Assistance Needed Physical assistance   Amount of Physical Assistance Provided 50%-74%   Comment Mod A STS   Sit to Stand CARE Score 2   Bed-Chair Transfer   Type of Assistance Needed Physical assistance   Amount of Physical Assistance Provided 50%-74%   Comment Mod A of 1 completing modified sit pivot with therapist in front   Chair/Bed-to-Chair Transfer CARE Score 2   [address] [place]   Type of Assistance Needed Physical assistance   Amount of Physical Assistance Provided Total assistance   Comment Mod A of 1 in stance, 2nd person completing hygiene   Toileting Hygiene CARE Score 1   Toilet Transfer   Type of Assistance Needed Physical assistance   Amount of Physical Assistance Provided 50%-74%   Comment Mod A of 1, sit pivot transfer    Toilet Transfer CARE Score 2   Exercise Tools   Exercise Tools Yes   Other Exercise Tool 1 Pt engaged in 3# towel glides, completed 3x10 all planes  Pt completed with focus on improving UE strength, endurance training, LUE ROM  Pt reports improved ROM following exercises  Cognition   Overall Cognitive Status Impaired   Arousal/Participation Alert; Cooperative   Attention Attends with cues to redirect   Orientation Level Disoriented to situation;Disoriented to place   Memory Decreased short term memory;Decreased recall of recent events;Decreased recall of precautions   Following Commands Follows one step commands with increased time or repetition   Comments Pt disoriented of year, date or location   Activity Tolerance   Activity Tolerance Patient limited by fatigue   Assessment   Treatment Assessment Pt engaged in skilled OT session with focus on stand pivot transfers without AD, toileting, UE strengthening  Pt tolerated session well with successful BM in session with no incontinence  Pt continues to demo impaired cognition, requiring to be reoriented in session and poor recall of recent events, even didn't recall dominguez  Pt completed stand pivot without AD with Mod A of 1 demo improved performances  Pt will continue to benefit from skilled OT intervention to address standing balance/tolernace, sit <> stand transfers, toileting with BSC, R UE strengthening (L UE pacer precautions) in order to maximize functional independence in ADLS, functional mobility/transfers, while decreasing burden of care  Prognosis Fair   Problem List Decreased strength;Decreased endurance; Impaired balance;Decreased mobility; Decreased cognition;Decreased safety awareness;Pain   Barriers to Discharge Inaccessible home environment;Decreased caregiver support   Plan   Treatment/Interventions ADL retraining;Functional transfer training; Therapeutic exercise; Endurance training;Cognitive reorientation;Patient/family training;Equipment eval/education; Compensatory technique education   Progress Slow progress, cognitive deficits   Recommendation   OT Discharge Recommendation   (pending pt progress)   OT Therapy Minutes   OT Time In 0830   OT Time Out 1000   OT Total Time (minutes) 90   OT Mode of treatment - Individual (minutes) 90   OT Mode of treatment - Concurrent (minutes) 0   OT Mode of treatment - Group (minutes) 0   OT Mode of treatment - Co-treat (minutes) 0   OT Mode of Treatment - Total time(minutes) 90 minutes   OT Cumulative Minutes 280

## 2021-01-26 ENCOUNTER — APPOINTMENT (INPATIENT)
Dept: RADIOLOGY | Facility: HOSPITAL | Age: 81
DRG: 949 | End: 2021-01-26
Payer: MEDICARE

## 2021-01-26 PROBLEM — R74.8 ABNORMAL LIVER ENZYMES: Status: ACTIVE | Noted: 2021-01-21

## 2021-01-26 LAB
ABO GROUP BLD: NORMAL
ALBUMIN SERPL BCP-MCNC: 2.2 G/DL (ref 3.5–5)
ALP SERPL-CCNC: 58 U/L (ref 46–116)
ALT SERPL W P-5'-P-CCNC: 23 U/L (ref 12–78)
AMYLASE SERPL-CCNC: 28 IU/L (ref 25–115)
ANION GAP SERPL CALCULATED.3IONS-SCNC: 5 MMOL/L (ref 4–13)
AST SERPL W P-5'-P-CCNC: 21 U/L (ref 5–45)
BACTERIA UR CULT: ABNORMAL
BACTERIA UR CULT: ABNORMAL
BASE EXCESS BLDA CALC-SCNC: 2 MMOL/L (ref -2–3)
BASOPHILS # BLD AUTO: 0.01 THOUSANDS/ΜL (ref 0–0.1)
BASOPHILS # BLD AUTO: 0.02 THOUSANDS/ΜL (ref 0–0.1)
BASOPHILS NFR BLD AUTO: 0 % (ref 0–1)
BASOPHILS NFR BLD AUTO: 0 % (ref 0–1)
BILIRUB SERPL-MCNC: 1.12 MG/DL (ref 0.2–1)
BLD GP AB SCN SERPL QL: NEGATIVE
BUN SERPL-MCNC: 11 MG/DL (ref 5–25)
CALCIUM ALBUM COR SERPL-MCNC: 9.7 MG/DL (ref 8.3–10.1)
CALCIUM SERPL-MCNC: 8.3 MG/DL (ref 8.3–10.1)
CHLORIDE SERPL-SCNC: 97 MMOL/L (ref 100–108)
CO2 SERPL-SCNC: 28 MMOL/L (ref 21–32)
CREAT SERPL-MCNC: 0.82 MG/DL (ref 0.6–1.3)
EOSINOPHIL # BLD AUTO: 0.05 THOUSAND/ΜL (ref 0–0.61)
EOSINOPHIL # BLD AUTO: 0.1 THOUSAND/ΜL (ref 0–0.61)
EOSINOPHIL NFR BLD AUTO: 1 % (ref 0–6)
EOSINOPHIL NFR BLD AUTO: 2 % (ref 0–6)
ERYTHROCYTE [DISTWIDTH] IN BLOOD BY AUTOMATED COUNT: 15.2 % (ref 11.6–15.1)
ERYTHROCYTE [DISTWIDTH] IN BLOOD BY AUTOMATED COUNT: 15.2 % (ref 11.6–15.1)
GFR SERPL CREATININE-BSD FRML MDRD: 67 ML/MIN/1.73SQ M
GLUCOSE SERPL-MCNC: 110 MG/DL (ref 65–140)
GLUCOSE SERPL-MCNC: 87 MG/DL (ref 65–140)
HCO3 BLDA-SCNC: 27.4 MMOL/L (ref 22–28)
HCT VFR BLD AUTO: 24.7 % (ref 34.8–46.1)
HCT VFR BLD AUTO: 26.8 % (ref 34.8–46.1)
HCT VFR BLD CALC: 23 % (ref 34.8–46.1)
HGB BLD-MCNC: 7.9 G/DL (ref 11.5–15.4)
HGB BLD-MCNC: 8.3 G/DL (ref 11.5–15.4)
HGB BLDA-MCNC: 7.8 G/DL (ref 11.5–15.4)
IMM GRANULOCYTES # BLD AUTO: 0.05 THOUSAND/UL (ref 0–0.2)
IMM GRANULOCYTES # BLD AUTO: 0.05 THOUSAND/UL (ref 0–0.2)
IMM GRANULOCYTES NFR BLD AUTO: 1 % (ref 0–2)
IMM GRANULOCYTES NFR BLD AUTO: 1 % (ref 0–2)
LACTATE SERPL-SCNC: 1.2 MMOL/L (ref 0.5–2)
LIPASE SERPL-CCNC: 58 U/L (ref 73–393)
LYMPHOCYTES # BLD AUTO: 0.53 THOUSANDS/ΜL (ref 0.6–4.47)
LYMPHOCYTES # BLD AUTO: 0.78 THOUSANDS/ΜL (ref 0.6–4.47)
LYMPHOCYTES NFR BLD AUTO: 12 % (ref 14–44)
LYMPHOCYTES NFR BLD AUTO: 16 % (ref 14–44)
MCH RBC QN AUTO: 29.6 PG (ref 26.8–34.3)
MCH RBC QN AUTO: 30.4 PG (ref 26.8–34.3)
MCHC RBC AUTO-ENTMCNC: 31 G/DL (ref 31.4–37.4)
MCHC RBC AUTO-ENTMCNC: 32 G/DL (ref 31.4–37.4)
MCV RBC AUTO: 95 FL (ref 82–98)
MCV RBC AUTO: 96 FL (ref 82–98)
MONOCYTES # BLD AUTO: 0.46 THOUSAND/ΜL (ref 0.17–1.22)
MONOCYTES # BLD AUTO: 0.69 THOUSAND/ΜL (ref 0.17–1.22)
MONOCYTES NFR BLD AUTO: 10 % (ref 4–12)
MONOCYTES NFR BLD AUTO: 14 % (ref 4–12)
NEUTROPHILS # BLD AUTO: 3.39 THOUSANDS/ΜL (ref 1.85–7.62)
NEUTROPHILS # BLD AUTO: 3.41 THOUSANDS/ΜL (ref 1.85–7.62)
NEUTS SEG NFR BLD AUTO: 67 % (ref 43–75)
NEUTS SEG NFR BLD AUTO: 76 % (ref 43–75)
NRBC BLD AUTO-RTO: 0 /100 WBCS
NRBC BLD AUTO-RTO: 0 /100 WBCS
PCO2 BLD: 29 MMOL/L (ref 21–32)
PCO2 BLD: 43.5 MM HG (ref 36–44)
PH BLD: 7.41 [PH] (ref 7.35–7.45)
PLATELET # BLD AUTO: 243 THOUSANDS/UL (ref 149–390)
PLATELET # BLD AUTO: 244 THOUSANDS/UL (ref 149–390)
PMV BLD AUTO: 10.4 FL (ref 8.9–12.7)
PMV BLD AUTO: 10.5 FL (ref 8.9–12.7)
PO2 BLD: 61 MM HG (ref 75–129)
POTASSIUM BLD-SCNC: 3.6 MMOL/L (ref 3.5–5.3)
POTASSIUM SERPL-SCNC: 3.8 MMOL/L (ref 3.5–5.3)
PROT SERPL-MCNC: 5.1 G/DL (ref 6.4–8.2)
RBC # BLD AUTO: 2.6 MILLION/UL (ref 3.81–5.12)
RBC # BLD AUTO: 2.8 MILLION/UL (ref 3.81–5.12)
RH BLD: POSITIVE
SAO2 % BLD FROM PO2: 91 % (ref 60–85)
SODIUM BLD-SCNC: 127 MMOL/L (ref 136–145)
SODIUM SERPL-SCNC: 130 MMOL/L (ref 136–145)
SPECIMEN EXPIRATION DATE: NORMAL
SPECIMEN SOURCE: ABNORMAL
TROPONIN I SERPL-MCNC: 0.04 NG/ML
WBC # BLD AUTO: 4.51 THOUSAND/UL (ref 4.31–10.16)
WBC # BLD AUTO: 5.03 THOUSAND/UL (ref 4.31–10.16)

## 2021-01-26 PROCEDURE — 83605 ASSAY OF LACTIC ACID: CPT | Performed by: INTERNAL MEDICINE

## 2021-01-26 PROCEDURE — 86850 RBC ANTIBODY SCREEN: CPT | Performed by: NURSE PRACTITIONER

## 2021-01-26 PROCEDURE — 82947 ASSAY GLUCOSE BLOOD QUANT: CPT

## 2021-01-26 PROCEDURE — 99232 SBSQ HOSP IP/OBS MODERATE 35: CPT | Performed by: PHYSICAL MEDICINE & REHABILITATION

## 2021-01-26 PROCEDURE — 74176 CT ABD & PELVIS W/O CONTRAST: CPT

## 2021-01-26 PROCEDURE — 99291 CRITICAL CARE FIRST HOUR: CPT | Performed by: NURSE PRACTITIONER

## 2021-01-26 PROCEDURE — 80053 COMPREHEN METABOLIC PANEL: CPT | Performed by: PHYSICAL MEDICINE & REHABILITATION

## 2021-01-26 PROCEDURE — 85025 COMPLETE CBC W/AUTO DIFF WBC: CPT | Performed by: NURSE PRACTITIONER

## 2021-01-26 PROCEDURE — 85025 COMPLETE CBC W/AUTO DIFF WBC: CPT | Performed by: INTERNAL MEDICINE

## 2021-01-26 PROCEDURE — 82803 BLOOD GASES ANY COMBINATION: CPT

## 2021-01-26 PROCEDURE — 85014 HEMATOCRIT: CPT

## 2021-01-26 PROCEDURE — 84295 ASSAY OF SERUM SODIUM: CPT

## 2021-01-26 PROCEDURE — G1004 CDSM NDSC: HCPCS

## 2021-01-26 PROCEDURE — 84132 ASSAY OF SERUM POTASSIUM: CPT

## 2021-01-26 PROCEDURE — 97116 GAIT TRAINING THERAPY: CPT

## 2021-01-26 PROCEDURE — 84484 ASSAY OF TROPONIN QUANT: CPT | Performed by: INTERNAL MEDICINE

## 2021-01-26 PROCEDURE — 83690 ASSAY OF LIPASE: CPT | Performed by: PHYSICAL MEDICINE & REHABILITATION

## 2021-01-26 PROCEDURE — 86900 BLOOD TYPING SEROLOGIC ABO: CPT | Performed by: NURSE PRACTITIONER

## 2021-01-26 PROCEDURE — P9016 RBC LEUKOCYTES REDUCED: HCPCS

## 2021-01-26 PROCEDURE — 86901 BLOOD TYPING SEROLOGIC RH(D): CPT | Performed by: NURSE PRACTITIONER

## 2021-01-26 PROCEDURE — 97530 THERAPEUTIC ACTIVITIES: CPT

## 2021-01-26 PROCEDURE — 82150 ASSAY OF AMYLASE: CPT | Performed by: PHYSICAL MEDICINE & REHABILITATION

## 2021-01-26 PROCEDURE — 86923 COMPATIBILITY TEST ELECTRIC: CPT

## 2021-01-26 RX ORDER — POLYETHYLENE GLYCOL 3350 17 G/17G
17 POWDER, FOR SOLUTION ORAL DAILY
Status: DISCONTINUED | OUTPATIENT
Start: 2021-01-26 | End: 2021-01-28

## 2021-01-26 RX ORDER — BISACODYL 10 MG
10 SUPPOSITORY, RECTAL RECTAL DAILY PRN
Status: DISCONTINUED | OUTPATIENT
Start: 2021-01-26 | End: 2021-01-29 | Stop reason: HOSPADM

## 2021-01-26 RX ORDER — SODIUM CHLORIDE 9 MG/ML
999 INJECTION, SOLUTION INTRAVENOUS ONCE
Status: COMPLETED | OUTPATIENT
Start: 2021-01-26 | End: 2021-01-26

## 2021-01-26 RX ORDER — AMOXICILLIN 250 MG
1 CAPSULE ORAL 2 TIMES DAILY
Status: DISCONTINUED | OUTPATIENT
Start: 2021-01-26 | End: 2021-01-28

## 2021-01-26 RX ADMIN — ATORVASTATIN CALCIUM 20 MG: 20 TABLET, FILM COATED ORAL at 17:31

## 2021-01-26 RX ADMIN — POLYETHYLENE GLYCOL 3350 17 G: 17 POWDER, FOR SOLUTION ORAL at 14:48

## 2021-01-26 RX ADMIN — DOCUSATE SODIUM AND SENNOSIDES 1 TABLET: 8.6; 5 TABLET ORAL at 17:31

## 2021-01-26 RX ADMIN — Medication 250 MG: at 17:31

## 2021-01-26 RX ADMIN — RANOLAZINE 1000 MG: 500 TABLET, FILM COATED, EXTENDED RELEASE ORAL at 21:32

## 2021-01-26 RX ADMIN — PRASUGREL HYDROCHLORIDE 10 MG: 10 TABLET, FILM COATED ORAL at 13:04

## 2021-01-26 RX ADMIN — SODIUM CHLORIDE 999 ML/HR: 0.9 INJECTION, SOLUTION INTRAVENOUS at 09:54

## 2021-01-26 NOTE — PROGRESS NOTES
Pastoral Care Progress Note    2021  Patient: Rosie Conner : 1940  Admission Date & Time: 2021 1837  MRN: [id number] CSN: [id number]         responded to rapid response alert  Patient is busy with medical team; no family present         21 1018   Clinical Encounter Type   Visited With Patient not available

## 2021-01-26 NOTE — PROGRESS NOTES
01/26/21 1130   Swallow Assessment   Swallow Treatment Assessment Attempted to see pt for dysphagia therapy session, however, pt had rapid response called this AM and was placed on med hold  Pt was also made NPO by GI team at this time for potential further work up  At this time, GI team recommending that pt remain NPO until 1/27/ for possible completion of EGD   ST team to continue to follow pt as pt is appropriate and cleared for PO intake by medical team     Therapy Time missed   Amount of time missed 30   Reason for time missed Medical hold   Time(s) multiple attempts made no, pt also made NPO

## 2021-01-26 NOTE — PROGRESS NOTES
Physical Medicine and Rehabilitation Progress Note  López Bailey 79 y o  female MRN: [id number]  Unit/Bed#: -47 Encounter: [id number]    HPI: López Bailey is a 79 y o  female who presented to the [facility] with chest pain and elevated troponins and underwent cardiac cath and stent placement however later developed new onset a-fib and tachy-jillian syndrome requiring pacer placement on 1/19  Course complicated by PNA which was treated with abx and colitis  Chief Complaint: cardiac debility     Subjective: patient currently comfortable     ROS: A 10 point ROS was performed; negative except as noted above       Assessment/Plan:      A-fib [facility])  Assessment & Plan  - new onset in acute care   - at home on toprol XL (24 hr) 25 mg qd however was increased to 50 mg after prior hospitalization (IM managing BB)   - started on xarelto 15 mg qdinner in acute care per EP recommendations (CrCl was borderline or below cutoff for 20 mg qdinner dose in acute care therefore EP elected for lower 15 mg dose and while CrCl has been higher at times here in rehab unit will continue to use lower 15 mg dose as CrCl tends to fluctuate above and below cut off and over the last year appears to below cutoff more often than not) -->currently on hold due to 1 heme + stool out of 3 samples per GI recommendations   - OP FU with Dr Lashanda Glover and Marcelle Worrell (scheduled for 2/18)     CAD (coronary artery disease)  Assessment & Plan  - h/o CABG  - elevated troponins in acute care and patient is s/p LAMINE on 1/11/21  - at home was on zocor 40 mg qpm however was switched to lipitor 20 mg qpm after prior hospitalization   - home asa stopped in acute care per EP recommendations    - continued on home effient 10 mg qd per EP recommendations--> 1 heme + stool out of 3 samples, d/w GI and they have cleared patient to continue effient at this time   - home ranexa increased from 500 mg q12 to 1000 mg q12 after prior hospitalization    - at home on toprol XL (24 hr) 25 mg qd however increased to 50 mg after prior hospitalization (IM managing BB dose while in rehab unit)   - OP FU with Dr Freya Lemos and Shruthi Shipman (scheduled for 2/18)           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[facility] [address] )  Assessment & Plan  - grade 1 DD  - EF 45%  - at home on torsemide 20 mg qd however per patient she takes a 1/2 tab daily however ultimately it appears this was switched to lasix 40 mg qd after prior hospitalization  - chest XR of 1/20 revealed small B/L pleural effusions in acute care (however patient also had PNA in acute care and is s/p abx)  - daily weights, I/O   - IM managing while in rehab unit   - OP FU with Dr Freya Lemos and Shruthi Shipman (which is scheduled for 2/18)             * 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[facility])  Assessment & Plan  - s/p pacer on 1/19  - FU for device and incision check scheduled for 2/1  - OP FU with Dr Freya Lemos and Mauricio Boas (scheduled for 2/18)     Hypokalemia  Assessment & Plan  - now WNL     Urinary retention  Assessment & Plan  - urinary retention protocol   - UA completed however does not appear to indicate infxn, UCx finalized and grew 60-69 K cfu/ml of candida and less then 10 K cfu/ml VRE-->d/w IM and they do not recommend treatment given UA and low cfu/ml counts for both the candida (which IM suspects is a contaminant) and the VRE  - dominguez placed on 1/23; plan for TOV on 1/28    Edema of left upper extremity  Assessment & Plan  - in the setting of recent pacer placement   - LUE negative for DVT, may be 2/2 to dependent edema in the setting or recent PPM placement      CKD (chronic kidney disease)  Assessment & Plan  - Cr currently 0 82  - baseline Cr appears to be 0 8-1 0  - IM monitoring     Anemia  Assessment & Plan  - Hg currently 8 3  - 1 heme+ stool out of 3 samples  - CT abd/pelvis ordered per IM recommendation to r/o retroperitoneal bleed   - d/w GI who are evaluating patient and recommend holding xarelto but continue effient at this time and are in agreement with IM to give a unit of blood   - recheck in AM       Hyponatremia  Assessment & Plan  - now down to 130 in the setting of diarrhea, poor PO, & recent diuretic use (currently on hold) --> received IV normal saline per IM; recheck in AM   - IM managing     Abnormal liver enzymes  Assessment & Plan  - AST now WNL   - mildly elevated total bilirubin of 1 12 (ULN 1 00) with Alk phos WNL   - GI following     Dysphagia  Assessment & Plan  - mild at level 3 diet  - continue ST      Diarrhea  Assessment & Plan  - h/o IBS  - h/o ischemic colitis in 2019  - C diff negative on 1/18/21  - CT done in acute care suggestive of infectious colitis   - GI consulted and recommend continued use of imodium and no further inpt PAULSON at this time unless symptoms worsen and recommend OP colonoscopy given h/o of ischemic colits in 2019 however given 1 heme+ stool out of 3 samples GI is reassessing need for possible colonoscopy   - CT abd/pelvis ordered as well per IM recommendation for drop in hemoglobin to r/o retroperitoneal bleed     PAD (peripheral artery disease) (HCC)  Assessment & Plan  - diffuse PAD with B/L LE occlusive disease  - chronic occlusion of Rt subclavian artery  - stenosis of L subclavian artery, L carotid artery, celiac artery, B/L renal arteries, SMA, JETHRO, L ALBERTO  - home asa stopped in acute care per EP recommendations  - home effient 10 mg qd continued per EP recommendations   - was on zocor 40 mg qpm previously however switched to lipitor 20 mg qpm after last hospitalization   - OP FU with Dr Levar Yoo hypertension  Assessment & Plan  - at home on regimen of norvsasc 5 mg qd however recently increased to 10 mg after prior hospitalization, imdur (24 hr) 60 mg qd however increased to 90 mg after prior hospitalization, toprol XL (24 hr) 25 mg qd however increased to 50 mg after prior hospitalization, and cozaar 50 mg qd (patient also appears to have been on torsemide 20 mg qd however patient states she takes a 1/2 tab qd and ultimately this appears to have been switched to lasix 40 mg after prior hospitalization)   - IM managing      Scheduled Meds:  Current Facility-Administered Medications   Medication Dose Route Frequency Provider Last Rate    acetaminophen  650 mg Oral Q6H PRN Pawan Torres MD      atorvastatin  20 mg Oral Daily With Erica Santiago MD      lidocaine  1 application Urethral J3I PRN Pawan Torres MD      losartan  25 mg Oral Daily David Green MD      metoprolol succinate  50 mg Oral Daily David Green MD      ondansetron  4 mg Oral Q6H PRN KATHY Horan      oxyCODONE  2 5 mg Oral Q4H PRN David Green MD      prasugrel  10 mg Oral Daily KATHY Horan      ranolazine  1,000 mg Oral Q12H David Green MD      saccharomyces boulardii  250 mg Oral BID Pawan Torres MD            Incidental findings:     1) abnormalities on echocardiogram while in acute care including but not limited to elevated peak PA pressure: OP FU with Dr Mio Agrawal and Rey CHAVEZ     2) abnormalities on EKG while in acute care: in the setting of paced rhythm and patient was evaluated by cards with no further inpt PAULSON/intervention recommended; OP FU with Dr Mio CHAVEZ     3) small hiatal hernia: asymptomatic; OP FU with PCP with further testing/treatment and/or specialist referral at PCP's discretion      4) gallstones w/distended gallbladder (no pericholecystatic inflammatory findings per imaging report): asymptomatic; OP FU with PCP with further testing/treatment and/or specialist referral at PCP's discretion      5) non-obstructive 1 mm L kidney stone: asymptomatic; OP FU with PCP with further testing/treatment and/or specialist referral at PCP's discretion     6) low lipase level: mildly decreased at 58 (LLN 73) on 1/26 and was 50 on 1/12, evaluated by GI and no further PAULSON/intervention for low lipsie was recommended however CT abd/pelvis was ordered per IM recommendation due to drop in Hg and r/o retroperitoneal bleed which is PENDING     DVT ppx: xarelto currently on hold per GI recommendations due to heme+ stool (1 out of 3) however will continue with TEDs         Objective:    Functional Update:  Mobility: max   Transfers: total   ADLs: total       Physical Exam:    Vitals:    01/26/21 1228   BP: 123/59   Pulse: 72   Resp: 18   Temp: 97 9 °F (36 6 °C)   SpO2: 95%           General: alert, no apparent distress, cooperative and comfortable  HEENT:  Head: Normal, normocephalic, atraumatic    Eye: Normal external eye, conjunctiva, lidsc cornea  Ears: Normal external ears  Nose: Normal external nose, mucus membranes  CARDIAC:  +S1/2  LUNGS:  no abnormal respiratory pattern, no retractions noted, non-labored breathing   ABDOMEN:  soft NT  EXTREMITIES:  no cyanosis  NEURO:  awake, alert   PSYCH:  mood/affect currently stable     Diagnostic Studies:  VAS upper limb venous duplex scan, unilateral/limited   Final Result by Scarlet Cortes MD (01/22 2155)      CT abdomen pelvis wo contrast    (Results Pending)       Laboratory:   Results from last 7 days   Lab Units 01/26/21  1048 01/26/21  1028 01/26/21  0551 01/25/21  1150   HEMOGLOBIN g/dL 8 3*  --  7 9* 8 6*   I STAT HEMOGLOBIN g/dl  --  7 8*  --   --    HEMATOCRIT % 26 8*  --  24 7* 26 7*   HEMATOCRIT, ISTAT %  --  23*  --   --    WBC Thousand/uL 4 51  --  5 03 6 05     Results from last 7 days   Lab Units 01/26/21  1028 01/26/21  0551 01/25/21  0620 01/24/21  0501   BUN mg/dL  --  11 6 6   SODIUM mmol/L  --  130* 132* 131*   POTASSIUM mmol/L  --  3 8 3 3* 3 6   CHLORIDE mmol/L  --  97* 97* 97*   GLUCOSE, ISTAT mg/dl 110  --   --   --    CREATININE mg/dL  --  0 82 0 59* 0 57*   AST U/L  --  21  --   --    ALT U/L  --  23  --   --

## 2021-01-26 NOTE — PLAN OF CARE
Problem: Prexisting or High Potential for Compromised Skin Integrity  Goal: Skin integrity is maintained or improved  Description: INTERVENTIONS:  - Identify patients at risk for skin breakdown  - Assess and monitor skin integrity  - Assess and monitor nutrition and hydration status  - Monitor labs   - Assess for incontinence   - Turn and reposition patient  - Assist with mobility/ambulation  - Relieve pressure over bony prominences  - Avoid friction and shearing  - Provide appropriate hygiene as needed including keeping skin clean and dry  - Evaluate need for skin moisturizer/barrier cream  - Collaborate with interdisciplinary team   - Patient/family teaching  - Consider wound care consult   Outcome: Progressing     Problem: Prexisting or High Potential for Compromised Skin Integrity  Goal: Skin integrity is maintained or improved  Description: INTERVENTIONS:  - Identify patients at risk for skin breakdown  - Assess and monitor skin integrity  - Assess and monitor nutrition and hydration status  - Monitor labs   - Assess for incontinence   - Turn and reposition patient  - Assist with mobility/ambulation  - Relieve pressure over bony prominences  - Avoid friction and shearing  - Provide appropriate hygiene as needed including keeping skin clean and dry  - Evaluate need for skin moisturizer/barrier cream  - Collaborate with interdisciplinary team   - Patient/family teaching  - Consider wound care consult   Outcome: Progressing     Problem: CARDIOVASCULAR - ADULT  Goal: Maintains optimal cardiac output and hemodynamic stability  Description: INTERVENTIONS:  - Monitor I/O, vital signs and rhythm  - Monitor for S/S and trends of decreased cardiac output  - Administer and titrate ordered vasoactive medications to optimize hemodynamic stability  - Assess quality of pulses, skin color and temperature  - Assess for signs of decreased coronary artery perfusion  - Instruct patient to report change in severity of symptoms  Outcome: Progressing  Goal: Absence of cardiac dysrhythmias or at baseline rhythm  Description: INTERVENTIONS:  - Continuous cardiac monitoring, vital signs, obtain 12 lead EKG if ordered  - Administer antiarrhythmic and heart rate control medications as ordered  - Monitor electrolytes and administer replacement therapy as ordered  Outcome: Progressing     Problem: METABOLIC, FLUID AND ELECTROLYTES - ADULT  Goal: Electrolytes maintained within normal limits  Description: INTERVENTIONS:  - Monitor labs and assess patient for signs and symptoms of electrolyte imbalances  - Administer electrolyte replacement as ordered  - Monitor response to electrolyte replacements, including repeat lab results as appropriate  - Instruct patient on fluid and nutrition as appropriate  Outcome: Progressing  Goal: Fluid balance maintained  Description: INTERVENTIONS:  - Monitor labs   - Monitor I/O and WT  - Instruct patient on fluid and nutrition as appropriate  - Assess for signs & symptoms of volume excess or deficit  Outcome: Progressing  Goal: Glucose maintained within target range  Description: INTERVENTIONS:  - Monitor Blood Glucose as ordered  - Assess for signs and symptoms of hyperglycemia and hypoglycemia  - Administer ordered medications to maintain glucose within target range  - Assess nutritional intake and initiate nutrition service referral as needed  Outcome: Progressing     Problem: SKIN/TISSUE INTEGRITY - ADULT  Goal: Skin integrity remains intact  Description: INTERVENTIONS  - Identify patients at risk for skin breakdown  - Assess and monitor skin integrity  - Assess and monitor nutrition and hydration status  - Monitor labs (i e  albumin)  - Assess for incontinence   - Turn and reposition patient  - Assist with mobility/ambulation  - Relieve pressure over bony prominences  - Avoid friction and shearing  - Provide appropriate hygiene as needed including keeping skin clean and dry  - Evaluate need for skin moisturizer/barrier cream  - Collaborate with interdisciplinary team (i e  Nutrition, Rehabilitation, etc )   - Patient/family teaching  Outcome: Progressing  Goal: Incision(s), wounds(s) or drain site(s) healing without S/S of infection  Description: INTERVENTIONS  - Assess and document risk factors for skin impairment   - Assess and document dressing, incision, wound bed, drain sites and surrounding tissue  - Consider nutrition services referral as needed  - Oral mucous membranes remain intact  - Provide patient/ family education  Outcome: Progressing  Goal: Oral mucous membranes remain intact  Description: INTERVENTIONS  - Assess oral mucosa and hygiene practices  - Implement preventative oral hygiene regimen  - Implement oral medicated treatments as ordered  - Initiate Nutrition services referral as needed  Outcome: Progressing     Problem: PAIN - ADULT  Goal: Verbalizes/displays adequate comfort level or baseline comfort level  Description: Interventions:  - Encourage patient to monitor pain and request assistance  - Assess pain using appropriate pain scale  - Administer analgesics based on type and severity of pain and evaluate response  - Implement non-pharmacological measures as appropriate and evaluate response  - Consider cultural and social influences on pain and pain management  - Notify physician/advanced practitioner if interventions unsuccessful or patient reports new pain  Outcome: Progressing

## 2021-01-26 NOTE — PROGRESS NOTES
01/26/21 1427   Therapy Time missed   Time missed?  Yes   Amount of time missed 30   Reason for time missed Medical hold

## 2021-01-26 NOTE — PCC PHYSICAL THERAPY
Pt with increased fatigue and increased confusion with cont encouragement through out session  Pt was able to tolerate amb x10-25 feet with standing rest breaks and WC follow for safety  BLE ace wrapped due to increased swelling noted  Pt main barriers at this time are poor act tolerance, poor engagement and decreased strength  Pt will cont to benefit from cont endurance training, gait training and cont reinforcement for safety with all transfers

## 2021-01-26 NOTE — PROGRESS NOTES
01/26/21 1230   Therapy Time missed   Time missed?  Yes   Amount of time missed 90   Reason for time missed Medical hold

## 2021-01-26 NOTE — PROGRESS NOTES
Internal Medicine Progress Note  Patient: Venkat Vance  Age/sex: 79 y o  female  Medical Record #: [id number]      ASSESSMENT/PLAN: (Interval History)  Venkat Vance is seen and examined and management for following issues:    Hypotension  · Today in therapy, dropped BP into 60's = placed in bed, flat/knees up and BP still 78 systolic --> started IVF NSS and was giving bolus but BP was still not coming up and called RR when BP became hard to hear at all  · ISTAT with O2 61 = placed on 2L O2  · Has gotten almost 1 liter NSS with the original bolus and IVF given during RR  · BP has returned to baseline now  · etio of hypovolemia likely from diuretic (placed on hold 1/25 2/2 freq diarrhea), diarrhea and possibly drop in hemoglobin  · EKG was 100% paced  ·  NOTE: BPs in left arm are higher than right (has multifocal calcified plaque at the left subclavian artery origin and proximal vessel resulting in mild to moderate (50-60%) stenosis  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)  Try to use left arm for BPs    Hyponatremia  · Given NSS as above  · Will recheck in AM    Acute/chronic systolic/diastolic CHF, LVEF 77%/CV 1 DD/moderate pulm HTN and mild-mod MR  · Acute CHF was felt to be 2/2 falsh pulm edema from coronary ischemia  · Continue Cozaar 25mg qd/Toprol    · Was on Lasix 60mg daily = on hold since hypotensive  · Will likely not need as much diuretic when restarted given cause of CHF was flash pulm edema     NSTEMI with hx CAD/CABG; PTCA/LAMINE p LAD and ostial LMCA 1/11/21  · Grafts to D1 and OM1 were 100% occluded and not amenable to intervention   · Continue Ranexa/Effient/Lipitor and Toprol     New onset PAF  · New to Xarelto (price check = $100 00/mo and family OK with this)  · Continue Xarelto, Toprol     Significant conversion pause; s/p dual chamber PPM with left poseterior fascicle lead 1/19/21  · Continue pacer precautions  · Watch incision = currently dressing still on; remove 1/26     Enteritis/colitis  · Has hx ischemic colitis 2019/2017/2016 with rectal bleeding and has  celiac/JETHRO stenoses  · GI saw and wanted to continue Imodium scheduled but if sx worsen then possible EGD/c-scope  · C diff negative  · Today, GI saw again  CT showed stool throughout colon and they feel was having liquid stool around retained stool in the colon  · Imodium has been stopped and Senokot, Miralax ordered     Aspiration PNA  · S/p antibiotic  · Resolved     Dysphagia  · Continue Dysphagia level 3/thins  · ST following     Urine retention  · Had large volumes with straight catheterization and dominguez placed  · PMR checked UA - small leuks, positive nitrite = Cx with 60,000-69,000 Candida  · No fever/leukocytosis therefore observe for now      LUE edema  · Venous doppler was negative for DVT on 1/22/21      Acute anemia  · No reported blood in stool  · Stool hemoccults = 1 was positive, 2 were negative  · Denies any abdominal pain and was not tender on palpation  · For 1 unit PRBCs now   · CT scan of abdomen to R/O RP bleed  · For possible EGD 1/27 so on clear liquids then NPO after midnight     Nausea/vomiting  · On 1/25 had some N/V which have subsided  · will continue 4mg q6hrs prn       The above assessment and plan was reviewed and updated as determined by my evaluation of the patient on 1/26/2021      Labs:   Results from last 7 days   Lab Units 01/26/21  1048 01/26/21  1028 01/26/21  0551   WBC Thousand/uL 4 51  --  5 03   HEMOGLOBIN g/dL 8 3*  --  7 9*   I STAT HEMOGLOBIN g/dl  --  7 8*  --    HEMATOCRIT % 26 8*  --  24 7*   HEMATOCRIT, ISTAT %  --  23*  --    PLATELETS Thousands/uL 244  --  243     Results from last 7 days   Lab Units 01/26/21  1028 01/26/21  0551 01/25/21  0620   SODIUM mmol/L  --  130* 132*   POTASSIUM mmol/L  --  3 8 3 3*   CHLORIDE mmol/L  --  97* 97*   CO2 mmol/L  --  28 30   CO2, I-STAT mmol/L 29  --   --    BUN mg/dL  --  11 6   CREATININE mg/dL  --  0 82 0 59* GLUCOSE, ISTAT mg/dl 110  --   --    CALCIUM mg/dL  --  8 3 8 4                   Review of Scheduled Meds:  Current Facility-Administered Medications   Medication Dose Route Frequency Provider Last Rate    acetaminophen  650 mg Oral Q6H PRN Lydia Dillon MD      atorvastatin  20 mg Oral Daily With Litzy Rodriguez MD      lidocaine  1 application Urethral R3S PRN Lydia Dillon MD      loperamide  2 mg Oral TID PRN Arturo Galvan MD      loperamide  2 mg Oral TID Lydia Dillon MD      losartan  25 mg Oral Daily Arturo Galvan MD      metoprolol succinate  50 mg Oral Daily Arturo Galvan MD      ondansetron  4 mg Oral Q6H PRN Asuncion Bumps, CRNP      oxyCODONE  2 5 mg Oral Q4H PRN Arturo Galvan MD      prasugrel  10 mg Oral Daily Asuncion Bumps, CRNP      ranolazine  1,000 mg Oral Q12H Arturo Galvan MD      saccharomyces boulardii  250 mg Oral BID Lydia Dillon MD         Subjective/ HPI: Patients overnight issues or events were reviewed with nursing or staff during rounds or morning huddle session  Rapid response for hypotension today      ROS:   A 10 point ROS was performed; negative except as noted above         Imaging:     VAS upper limb venous duplex scan, unilateral/limited   Final Result by Elizabeth Sims MD (01/22 2155)      CT abdomen pelvis w contrast    (Results Pending)   CT abdomen pelvis wo contrast    (Results Pending)       *Labs /Radiology studies reviewed  *Medications reviewed and reconciled as needed  *Please refer to order section for additional ordered labs studies  *Case discussed with primary attending during morning huddle case rounds      Physical Examination:  Vitals:   Vitals:    01/26/21 1055 01/26/21 1105 01/26/21 1114 01/26/21 1121   BP: 139/83 119/55 117/55 (P) 124/77   BP Location: Left arm Left arm Left arm    Pulse: 74 74 73 (P) 74   Resp:   18    Temp:   (!) 97 °F (36 1 °C) (!) (P) 82 °F (27 8 °C)   TempSrc:   Axillary    SpO2:   94% (P) 100%   Weight: Height:           General Appearance: no distress, conversive  HEENT: PERRLA, conjuctiva normal; oropharynx clear; mucous membranes moist   Neck:  Supple, normal ROM, no JVD  Lungs: CTA, normal respiratory effort, no retractions, expiratory effort normal  CV: regular rate and rhythm; no rubs/murmurs/gallops, PMI normal   ABD: soft; ND/NT; +BS  EXT: mild LE edema into lateral thighs; very mild LUE edema which is resolving  Skin: normal turgor, normal texture, no rashes  Psych: affect normal, mood normal  Neuro: AA; mildly confused  ESPARZA w/o focal deficits      The above physical exam was reviewed and updated as determined by my evaluation of the patient on 1/26/2021  Invasive Devices     Peripheral Intravenous Line            Peripheral IV 01/24/21 Right;Ventral (anterior) Forearm 2 days          Drain            Urethral Catheter Other (Comment) 16 Fr  2 days                   VTE Pharmacologic Prophylaxis: Xarelto  Code Status: Level 1 - Full Code  Current Length of Stay: 5 day(s)      Total time spent:  30 minutes with more than 50% spent counseling/coordinating care  Counseling includes discussion with patient re: progress  and discussion with patient of his/her current medical state/information  Coordination of patient's care was performed in conjunction with primary service  Time invested included review of patient's labs, vitals, and management of their comorbidities with continued monitoring  In addition, this patient was discussed with medical team including physician and advanced extenders  The care of the patient was extensively discussed and appropriate treatment plan was formulated unique for this patient  ** Please Note:  voice to text software may have been used in the creation of this document   Although proof errors in transcription or interpretation are a potential of such software**

## 2021-01-26 NOTE — PROGRESS NOTES
BP inaudible by scope  Rapid response called  Recc transfer to ICU for further BP support  Doppler BP 80 systolic  Rapid response team now arrived

## 2021-01-26 NOTE — PROGRESS NOTES
01/26/21 0830   Pain Assessment   Pain Assessment Tool Layne-Baker FACES   Layne-Baker FACES Pain Rating 4   Pain Location/Orientation Location: Groin   Pain Onset/Description Descriptor: Pressure; Descriptor: Burning; Descriptor: Discomfort   Restrictions/Precautions   Precautions Aspiration;Bed/chair alarms;Cognitive; Fall Risk; Saeed; Pacemaker;Supervision on toilet/commode   Weight Bearing Restrictions No   ROM Restrictions Yes   LUE ROM Restriction Range Limitation  (Pacemeaker precautions)   Cognition   Overall Cognitive Status Impaired   Attention Attends with cues to redirect   Orientation Level Unable to assess   Memory Decreased short term memory;Decreased recall of recent events;Decreased recall of precautions   Following Commands Follows one step commands with increased time or repetition   Roll Left and Right   Type of Assistance Needed Physical assistance   Amount of Physical Assistance Provided Total assistance   Comment MAX/MODA with second person SBA   Roll Left and Right CARE Score 1   Sit to Lying   Type of Assistance Needed Physical assistance   Amount of Physical Assistance Provided Total assistance   Comment A for UE and LE due to fatigue   Sit to Lying CARE Score 1   Lying to Sitting on Side of Bed   Type of Assistance Needed Physical assistance   Amount of Physical Assistance Provided 75% or more   Comment MAX A with verbal cues for sequencing  Lying to Sitting on Side of Bed CARE Score 2   Sit to Stand   Type of Assistance Needed Physical assistance   Amount of Physical Assistance Provided 50%-74%   Comment MODA on first trial decrezsed to MIN/MODA after  Sit to Stand CARE Score 2   Bed-Chair Transfer   Type of Assistance Needed Physical assistance   Amount of Physical Assistance Provided 75% or more   Comment MAXA x1 for sit pivot due to fatigue   Chair/Bed-to-Chair Transfer CARE Score 2   Transfer Bed/Chair/Wheelchair   Limitations Noted In Balance; Coordination; Endurance;Problem Solving; Sequencing;LE Strength   Adaptive Equipment Roller Walker   Car Transfer   Reason if not Attempted Safety concerns   Car Transfer CARE Score 88   Walk 10 Feet   Type of Assistance Needed Physical assistance   Amount of Physical Assistance Provided Total assistance   Comment MIN/MODA x1 with WC follow for safety   Walk 10 Feet CARE Score 1   Walk 50 Feet with Two Turns   Reason if not Attempted Safety concerns   Walk 50 Feet with Two Turns CARE Score 88   Walk 150 Feet   Reason if not Attempted Safety concerns   Walk 150 Feet CARE Score 88   Walking 10 Feet on Uneven Surfaces   Reason if not Attempted Safety concerns   Walking 10 Feet on Uneven Surfaces CARE Score 88   Ambulation   Does the patient walk? 2  Yes   Primary Mode of Locomotion Prior to Admission Walk   Distance Walked (feet) 10 ft  (20, 25)   Assist Device Roller Walker   Gait Pattern Inconsistant Karrie; Slow Karrie;Decreased foot clearance; Forward Flexion; Shuffle;Step to; Improper weight shift   Limitations Noted In Balance;Device Management; Endurance; Heel Strike;Midline Orientation;Posture; Safety; Sequencing;Speed;Strength;Swing   Provided Assistance with: Balance;Trunk Support   Walk Assist Level Minimum Assist;Moderate Assist   Wheel 50 Feet with Two Turns   Reason if not Attempted Activity not applicable   Wheel 50 Feet with Two Turns CARE Score 9   Wheel 150 Feet   Reason if not Attempted Activity not applicable   Wheel 656 Feet CARE Score 9   Wheelchair mobility   Does the patient use a wheelchair? 0   No   Curb or Single Stair   Reason if not Attempted Safety concerns   1 Step (Curb) CARE Score 88   4 Steps   Reason if not Attempted Safety concerns   4 Steps CARE Score 88   12 Steps   Reason if not Attempted Activity not applicable   12 Steps CARE Score 9   Picking Up Object   Reason if not Attempted Safety concerns   Picking Up Object CARE Score 88   Therapeutic Interventions   Strengthening Seated LAQ, ankle DF/PF and hip flex with no added weight 3x10 reps   Assessment   Treatment Assessment Pt participated in skilled PT session with increased focus on gait, endurance and LE strengthening  Pt was extremely fatigued through out session and became increasingly pale  BP was taken in seated position and registered at 69/37  A manual BP was attempted but was too faint to hear  Nurse Claudette Rod was alerted and pt was taken back to room and put into bed  BLE were ace wrapped due to increased swelling and low BP  Pt was left in bed with nursing present  Pt will cont to benefit from cont endurance training and strengthening  Cont POC as tolerated  Problem List Decreased strength;Decreased endurance; Impaired balance;Decreased mobility; Decreased coordination;Decreased cognition;Decreased safety awareness;Pain   Barriers to Discharge Inaccessible home environment;Decreased caregiver support   PT Barriers   Functional Limitation Car transfers;Stair negotiation;Standing;Transfers; Walking   Plan   Treatment/Interventions Functional transfer training;LE strengthening/ROM; Therapeutic exercise; Endurance training;Cognitive reorientation; Bed mobility;Gait training;Family   Progress Slow progress, decreased activity tolerance   Recommendation   PT Discharge Recommendation   (TBD)   PT Therapy Minutes   PT Time In 0830   PT Time Out 0930   PT Total Time (minutes) 60   PT Mode of treatment - Individual (minutes) 60   PT Mode of treatment - Concurrent (minutes) 0   PT Mode of treatment - Group (minutes) 0   PT Mode of treatment - Co-treat (minutes) 0   PT Mode of Treatment - Total time(minutes) 60 minutes   PT Cumulative Minutes 240   Therapy Time missed   Time missed?  No

## 2021-01-26 NOTE — QUICK NOTE
GI Quick Note:    GI asked to evaluate patient yesterday for complaint of diarrhea but also with dropping hemoglobin without any overt GI bleeding  Hemoccult was noted to be positive  It appeared that her clinical symptoms of diarrhea were improving with conservative management which we were planning to continue  Notified by PMR that patient had rapid response called this morning for hypotension  She was also noted to have a further drop in hemoglobin although still without any overt signs of bleeding  Patient was ordered a stat unit of packed red cells and a stat CT scan to evaluate for potential retroperitoneal bleeding and/or other potential source of blood loss  Patient seen and evaluated at bedside  Remains hemodynamically stable at this time after IV fluid resuscitation  Patient does report feeling tired and weak but no other acute complaints at this time  Has not had bowel movement today but previous bowel movement was not noted to be significantly bloody in appearance  Denies any abdominal pain at this time  Does have intermittent nausea  Abdominal exam remains benign  Etiology for patient's hypotension this morning remains unclear  I do not suspect that GI hemorrhage is the cause for this event as there would have been clear clinical signs of bleeding which has been largely absent  Given patient's cardiac comorbidities, could potentially be related to that  However, given previous findings of ischemic colitis, the hypotensive episode today could exacerbate another ischemic colitis episode and subsequent hematochezia  CT scan was reviewed personally and no significant source of bleeding is evident  Of note, there is a significant amount of stool throughout the colon  I suspect that the patient's complaint of diarrhea was likely secondary to overflow diarrhea  Being constipated is also likely contributing to nausea and lack of appetite      Recommendations:  · Start clear liquid diet  · NPO after midnight in case there is continued drop in hemoglobin or evidence of ronald GI bleeding  · Monitor blood counts and transfuse as needed  · Discontinue Imodium and start aggressive bowel regimen  · Serial abdominal exams  · Monitor stool output  · Continue supportive management per primary team  · If there is any life-threatening bleeding, please inform the GI fellow on call, at which time he more urgent endoscopy may be warranted  · Recommend workup for other possible etiologies of acute on chronic anemia    GI will see the patient again tomorrow  Recommendations were discussed with primary team     MARCO Patrick  Gastroenterology Fellow  [facility]  Available on Ltanya Shells  [email or web address]

## 2021-01-26 NOTE — RAPID RESPONSE
Progress Note - Rapid Response   Jenni Olivares 79 y o  female MRN: [id number]    Time Called ( Time): 10:01  Date Called: 1/26/2021  Level of Care: MS ARC  Room#: 599  [id number] Time ( Time): 10:05  Event End Time ( Time): 11:11   Primary reason for call: Acute change in SBP  Interventions:  Airway/Breathing:  O2 Mask/Nasal  Circulation: IV Fluid Bolus, Blood Products and EKG  Other Treatments: N/A       Assessment:   1  Rapid response called for hypotension, no mental status changes or syncope/presyncope symptoms  On exam patient is warm with pink extremities, palpable DP pulses and denies any infectious symptoms such as fever, cough, SOB  She elicits nausea and diarrhea though unclear of when this started, her primary team at bedside repost the past 48 hours or so  In this setting, 500 IVF bolus given and SBP improved from 80's to 90's with maps 65-70    2  Primary team reports concern for bleeding with previously noted HGB drop from 11 to 8 prompting hemoccult testing which was positive though no gross GI bleed noted in diarrhea/stool or emesis  STAT HGB checked on ISTAT which is stable at 7 8 from 7 9  non-con CT ordered by primary team for GI work-up possible colitis and to rule out any RP bleed on [facility] s/p recent cardiac interventions   3  ECG reviewed: V-paced rhythm without ST changes and pt denies any chest pain or discomfort, saturations 90-97% on room air  Was placed on 2L for comfort during rapid and troponin sent     Plan:   · Hypotension - appears hypovolemic in setting of poor PO intake and diarrhea recently, recommend gently fluid resuscitation and monitor response  Rule out cardiogenic - appears well perfused and warm, agree with spot trop since LAMINE 1/11 but low suspicion for cardiac source  Recent PPM placement 1/19 - low suspicion for complication such as pericardial effusion - if concerned can check CXR or ECHO   Rule out septic source - LA sent, no apparent complaints of infectious source besides diarrhea, no fevers or leukocytosis - agree with CT evaluation but low suspicion for sepsis   · Can remain current level of care  CC available via tiger text if any concerns or signs of deterioration        HPI/Chief Complaint (Background/Situation):   Joon Mcdowell is a 79y o  year old female who initially presented with chest pain and elevated troponin, she underwent cardiac cath with LAMINE 1/11 then 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  Her course was complicated by PNA and colitis for which she underwent full antibiotic course for  She remains mildly encephalopathic, more confused than reportedly her baseline but her mental status has remained stable since admission to rehab  She was recovering well working with rehab therapies when she developed hypotension today  She elicits nausea and diarrhea x 2 days with poor PO intake associated with this  ECG unremarkable, HGB stable from most recent and afebrile/no leukocytosis therefore suspect hypovolemic hypotension - responding to gently IVF bolus  Historical Information   Past Medical History:   Diagnosis Date    Anal fissure     Cardiac disorder     Cognitive changes 12/23/2020    Esophageal reflux     Esophagitis, reflux     Hemorrhoids     Hepatic hemangioma     Last Assessed: 1/13/2015    Herpes zoster     History of colonic polyps     Hypertension     [facility]schem[facility] colitis [facility] [address]     Lumbar herniated disc     Malignant neoplasm without specification of site ([facility] [address]     Nephrolithiasis     L   Lithotripsy    Nontoxic single thyroid nodule     Last Assessed: 1/13/2015    Osteoarthritis     Overactive bladder     Raynaud disease     Respiratory system disease     Sjogren's disease ([facility] [address] [facility]     Spinal stenosis     PONCHO (stress urinary incontinence, female)     Uterovaginal prolapse     Grade I-II     Past Surgical History:   Procedure Laterality Date    APPENDECTOMY  1947    CARDIAC SURGERY CABG    CATARACT EXTRACTION Bilateral     COLONOSCOPY  2012    Fiberoptic    COLONOSCOPY      Resolved: 2006 - 2012 5 year f/u    CORONARY ANGIOPLASTY WITH STENT PLACEMENT      CORONARY ARTERY BYPASS GRAFT      Resolved: 2012    ESOPHAGOGASTRODUODENOSCOPY  2012    Diagnostic    HEMORROIDECTOMY      KNEE SURGERY      LITHOTRIPSY      Renal    MALIGNANT SKIN LESION EXCISION      Face; Resolved: 2004    KS ESOPHAGOGASTRODUODENOSCOPY TRANSORAL DIAGNOSTIC N/A 4/13/2016    Procedure: EGD AND COLONOSCOPY;  Surgeon: Marlene Car MD;  [facility];   Service: Gastroenterology    RENAL ARTERY STENT      SKIN LESION EXCISION      Scalp    SOFT TISSUE TUMOR RESECTION      Shoulder; Resolved: 1995    THROMBOLYSIS      Postoperative Thrombolysis PTCA    TONSILLECTOMY      Resolved: 1944     Social History   Social History     Substance and Sexual Activity   Alcohol Use Not Currently    Frequency: Monthly or less    Comment: social     Social History     Substance and Sexual Activity   Drug Use No     Social History     Tobacco Use   Smoking Status Never Smoker   Smokeless Tobacco Never Used     Family History: non-contributory    Meds/Allergies   Current Facility-Administered Medications   Medication Dose Route Frequency Provider Last Rate    acetaminophen  650 mg Oral Q6H PRN Carlos Edwards MD      atorvastatin  20 mg Oral Daily With Yoel Vogel MD      lidocaine  1 application Urethral B2L PRN Carlos Edwards MD      loperamide  2 mg Oral TID PRN Jose Daniel Li MD      loperamide  2 mg Oral TID Carlos Edwards MD      losartan  25 mg Oral Daily Jose Daniel Li MD      metoprolol succinate  50 mg Oral Daily Jose Daniel Li MD      ondansetron  4 mg Oral Q6H PRN KATHY Lozano      oxyCODONE  2 5 mg Oral Q4H PRN Jose Daniel Li MD      prasugrel  10 mg Oral Daily KATHY Lozano      ranolazine  1,000 mg Oral Q12H Jose Daniel Li MD      saccharomyces boulardii  250 mg Oral BID Héctor Samson MD              Allergies   Allergen Reactions    Sulfa Antibiotics Anaphylaxis    Formaldehyde     Codeine Palpitations       ROS: Negative except "maybe tired but just because everyone is bothering me so much today"    Room air, in no distress, HR 65 bpm SBP 97 MAP 68 O2Sat 96%     Physical Exam:  Physical Exam  Constitutional:       Appearance: Normal appearance  HENT:      Head: Normocephalic and atraumatic  Eyes:      General:         Right eye: No discharge  Left eye: No discharge  Extraocular Movements: Extraocular movements intact  Pupils: Pupils are equal, round, and reactive to light  Neck:      Musculoskeletal: No neck rigidity  Cardiovascular:      Rate and Rhythm: Normal rate and regular rhythm  Pulses: Normal pulses  Heart sounds: Normal heart sounds  Pulmonary:      Effort: Pulmonary effort is normal  No respiratory distress  Breath sounds: Normal breath sounds  No stridor  No wheezing  Chest:      Chest wall: No tenderness  Abdominal:      General: There is no distension  Palpations: Abdomen is soft  Tenderness: There is no abdominal tenderness  There is no guarding  Musculoskeletal:         General: Swelling present  No deformity or signs of injury  Skin:     General: Skin is warm and dry  Neurological:      General: No focal deficit present  Mental Status: She is alert        Comments: Oriented to person only, follows all commands and moves all extremities with equal strength bilaterally, clear speech, facial symmetry, no pronator drift               Intake/Output Summary (Last 24 hours) at 1/26/2021 1057  Last data filed at 1/26/2021 5607  Gross per 24 hour   Intake 50 ml   Output 250 ml   Net -200 ml       Respiratory    Lab Data (Last 4 hours)    None         O2/Vent Data (Last 4 hours)    None              Invasive Devices     Peripheral Intravenous Line            Peripheral IV 01/24/21 Right;Ventral (anterior) Forearm 1 day          Drain            Urethral Catheter Other (Comment) 16 Fr  2 days                DIAGNOSTIC DATA:    Lab: I have personally reviewed pertinent lab results  CBC:   Results from last 7 days   Lab Units 01/26/21  1028 01/26/21  0551   WBC Thousand/uL  --  5 03   HEMOGLOBIN g/dL  --  7 9*   I STAT HEMOGLOBIN g/dl 7 8*  --    HEMATOCRIT %  --  24 7*   HEMATOCRIT, ISTAT % 23*  --    PLATELETS Thousands/uL  --  243     CMP:   Results from last 7 days   Lab Units 01/26/21  1028 01/26/21  0551 01/25/21  0620 01/24/21  0501   POTASSIUM mmol/L  --  3 8 3 3* 3 6   CHLORIDE mmol/L  --  97* 97* 97*   CO2 mmol/L  --  28 30 28   CO2, I-STAT mmol/L 29  --   --   --    BUN mg/dL  --  11 6 6   CREATININE mg/dL  --  0 82 0 59* 0 57*   CALCIUM mg/dL  --  8 3 8 4 7 8*   ALK PHOS U/L  --  58  --   --    ALT U/L  --  23  --   --    AST U/L  --  21  --   --    GLUCOSE, ISTAT mg/dl 110  --   --   --      PT/INR:   No results found for: PT, INR,   Magnesium: No components found for: MAG,   Phosphorous: No results found for: PHOS    Microbiology:  Lab Results   Component Value Date    BLOODCX No Growth After 5 Days  01/13/2021    BLOODCX No Growth After 5 Days  01/13/2021    BLOODCX No Growth After 5 Days  08/03/2019    URINECX 60,000-69,000 cfu/ml Candida albicans (A) 01/22/2021    URINECX <10,000 cfu/ml Enterococcus  faecium VRE (A) 01/22/2021    URINECX No Growth <1000 cfu/mL 01/14/2021         OUTCOME:   Stayed in room     Code Status: Level 1 - Full Code  Critical Care Time: Total Critical Care time spent 45 minutes excluding procedures, teaching and family updates

## 2021-01-26 NOTE — PROGRESS NOTES
Pts hgb further decline this AM  Stopped xeralto and ordered CT abdomen /pelvis r/o retroperitoneal bleed  She was comfortable this AM waiting CT scan  During therapy she became hypotensive with BP 60 systolic and diaphoretic  Immediately brought to her room and place on reverse Trendelenburg  Started 50 cc bolus IV NSS and type crossed and ordered 1 u PRBC stat based on symptomatic anemia from this AM lab values  Will change CT to IV contrast only and send stat when she is hemodynamically stable  Pt will need to be seen by critical care if BP doesn't respond to IVF in next 20 minutes  She remains awake and answers questions appropriately  O2 sats or 96% on room air  EKG shows 100% paced rhythm  Will send stat repeat CBC and troponin as well

## 2021-01-26 NOTE — PCC CARE MANAGEMENT
Pt was participating well but has had some medical issues arise  Pt expects to be able to return home and has been made aware of potential needs for contd therapy services on dc  Following to assist w/dc planning needs and recommendations

## 2021-01-27 LAB
ABO GROUP BLD BPU: NORMAL
ANION GAP SERPL CALCULATED.3IONS-SCNC: 4 MMOL/L (ref 4–13)
BASOPHILS # BLD AUTO: 0.02 THOUSANDS/ΜL (ref 0–0.1)
BASOPHILS NFR BLD AUTO: 1 % (ref 0–1)
BPU ID: NORMAL
BUN SERPL-MCNC: 6 MG/DL (ref 5–25)
CALCIUM SERPL-MCNC: 8.4 MG/DL (ref 8.3–10.1)
CHLORIDE SERPL-SCNC: 102 MMOL/L (ref 100–108)
CO2 SERPL-SCNC: 29 MMOL/L (ref 21–32)
CREAT SERPL-MCNC: 0.58 MG/DL (ref 0.6–1.3)
CROSSMATCH: NORMAL
EOSINOPHIL # BLD AUTO: 0.07 THOUSAND/ΜL (ref 0–0.61)
EOSINOPHIL NFR BLD AUTO: 2 % (ref 0–6)
ERYTHROCYTE [DISTWIDTH] IN BLOOD BY AUTOMATED COUNT: 14.6 % (ref 11.6–15.1)
GFR SERPL CREATININE-BSD FRML MDRD: 87 ML/MIN/1.73SQ M
GLUCOSE SERPL-MCNC: 86 MG/DL (ref 65–140)
HCT VFR BLD AUTO: 28 % (ref 34.8–46.1)
HGB BLD-MCNC: 9.2 G/DL (ref 11.5–15.4)
IMM GRANULOCYTES # BLD AUTO: 0.05 THOUSAND/UL (ref 0–0.2)
IMM GRANULOCYTES NFR BLD AUTO: 1 % (ref 0–2)
LYMPHOCYTES # BLD AUTO: 0.69 THOUSANDS/ΜL (ref 0.6–4.47)
LYMPHOCYTES NFR BLD AUTO: 16 % (ref 14–44)
MCH RBC QN AUTO: 31.2 PG (ref 26.8–34.3)
MCHC RBC AUTO-ENTMCNC: 32.9 G/DL (ref 31.4–37.4)
MCV RBC AUTO: 95 FL (ref 82–98)
MONOCYTES # BLD AUTO: 0.57 THOUSAND/ΜL (ref 0.17–1.22)
MONOCYTES NFR BLD AUTO: 13 % (ref 4–12)
NEUTROPHILS # BLD AUTO: 3.04 THOUSANDS/ΜL (ref 1.85–7.62)
NEUTS SEG NFR BLD AUTO: 67 % (ref 43–75)
NRBC BLD AUTO-RTO: 0 /100 WBCS
PLATELET # BLD AUTO: 230 THOUSANDS/UL (ref 149–390)
PMV BLD AUTO: 10.3 FL (ref 8.9–12.7)
POTASSIUM SERPL-SCNC: 3.7 MMOL/L (ref 3.5–5.3)
RBC # BLD AUTO: 2.95 MILLION/UL (ref 3.81–5.12)
SODIUM SERPL-SCNC: 135 MMOL/L (ref 136–145)
UNIT DISPENSE STATUS: NORMAL
UNIT PRODUCT CODE: NORMAL
UNIT RH: NORMAL
WBC # BLD AUTO: 4.44 THOUSAND/UL (ref 4.31–10.16)

## 2021-01-27 PROCEDURE — 99232 SBSQ HOSP IP/OBS MODERATE 35: CPT | Performed by: INTERNAL MEDICINE

## 2021-01-27 PROCEDURE — 99233 SBSQ HOSP IP/OBS HIGH 50: CPT | Performed by: PHYSICAL MEDICINE & REHABILITATION

## 2021-01-27 PROCEDURE — 97530 THERAPEUTIC ACTIVITIES: CPT

## 2021-01-27 PROCEDURE — 97535 SELF CARE MNGMENT TRAINING: CPT

## 2021-01-27 PROCEDURE — 97110 THERAPEUTIC EXERCISES: CPT

## 2021-01-27 PROCEDURE — 85025 COMPLETE CBC W/AUTO DIFF WBC: CPT | Performed by: PHYSICAL MEDICINE & REHABILITATION

## 2021-01-27 PROCEDURE — 97116 GAIT TRAINING THERAPY: CPT

## 2021-01-27 PROCEDURE — 80048 BASIC METABOLIC PNL TOTAL CA: CPT | Performed by: PHYSICAL MEDICINE & REHABILITATION

## 2021-01-27 RX ADMIN — Medication 250 MG: at 17:05

## 2021-01-27 RX ADMIN — DOCUSATE SODIUM AND SENNOSIDES 1 TABLET: 8.6; 5 TABLET ORAL at 09:05

## 2021-01-27 RX ADMIN — Medication 1 PACKET: at 12:12

## 2021-01-27 RX ADMIN — LOSARTAN POTASSIUM 25 MG: 25 TABLET, FILM COATED ORAL at 09:05

## 2021-01-27 RX ADMIN — ATORVASTATIN CALCIUM 20 MG: 20 TABLET, FILM COATED ORAL at 17:05

## 2021-01-27 RX ADMIN — METOPROLOL SUCCINATE 50 MG: 50 TABLET, EXTENDED RELEASE ORAL at 09:05

## 2021-01-27 RX ADMIN — POLYETHYLENE GLYCOL 3350 17 G: 17 POWDER, FOR SOLUTION ORAL at 09:06

## 2021-01-27 RX ADMIN — RANOLAZINE 1000 MG: 500 TABLET, FILM COATED, EXTENDED RELEASE ORAL at 21:01

## 2021-01-27 RX ADMIN — Medication 250 MG: at 09:05

## 2021-01-27 RX ADMIN — PRASUGREL HYDROCHLORIDE 10 MG: 10 TABLET, FILM COATED ORAL at 12:11

## 2021-01-27 RX ADMIN — RANOLAZINE 1000 MG: 500 TABLET, FILM COATED, EXTENDED RELEASE ORAL at 09:06

## 2021-01-27 NOTE — PROGRESS NOTES
01/27/21 1400   Pain Assessment   Pain Assessment Tool 0-10   Pain Score 5   Pain Location/Orientation Orientation: Left; Location: Shoulder   Multiple Pain Sites Yes   Pain 2   Pain Score 2 7  (urethral pain )   Restrictions/Precautions   Precautions Cognitive; Fall Risk;Bed/chair alarms;Pain;Supervision on toilet/commode;Pacemaker; Saeed  Contact precaution   LUE ROM Restriction Range Limitation  (pacemaker precautions)   Cognition   Overall Cognitive Status Impaired   Arousal/Participation Alert; Cooperative   Attention Attends with cues to redirect   Subjective   Subjective pt reported pain as above but was agreeable to have PT  pt reported nausea after 1st gait training only    Roll Left and Right   Type of Assistance Needed Physical assistance;Verbal cues; Adaptive equipment   Amount of Physical Assistance Provided 50%-74%   Comment mod A x 1 repeated rolling to facilitate hygiene after BM incontinence, donning/doffing pants/brief   Roll Left and Right CARE Score 2   Sit to Lying   Type of Assistance Needed Physical assistance;Verbal cues; Adaptive equipment   Amount of Physical Assistance Provided 50%-74%   Comment mod A   Sit to Lying CARE Score 2   Lying to Sitting on Side of Bed   Type of Assistance Needed Physical assistance   Amount of Physical Assistance Provided 75% or more   Comment max A   Lying to Sitting on Side of Bed CARE Score 2   Sit to Stand   Type of Assistance Needed Physical assistance;Verbal cues; Adaptive equipment   Amount of Physical Assistance Provided 50%-74%   Comment mod-min A x 1 VC for hand placement about 75% of the time   Sit to Stand CARE Score 2   Bed-Chair Transfer   Type of Assistance Needed Physical assistance;Verbal cues; Adaptive equipment   Amount of Physical Assistance Provided 50%-74%   Comment mod of 1 with RW    Chair/Bed-to-Chair Transfer CARE Score 2   Walk 10 Feet   Type of Assistance Needed Physical assistance;Verbal cues; Adaptive equipment   Amount of Physical Assistance Provided 50%-74%   Comment mind-mod  A x 1 x 45'   Walk 10 Feet CARE Score 2   Walk 50 Feet with Two Turns   Type of Assistance Needed Physical assistance;Verbal cues; Adaptive equipment   Amount of Physical Assistance Provided 50%-74%   Comment mod-min A x 1 with RW x 75'   Walk 50 Feet with Two Turns CARE Score 2   Curb or Single Stair   Style negotiated Curb  (6" )   Type of Assistance Needed Physical assistance;Verbal cues; Adaptive equipment   Amount of Physical Assistance Provided 75% or more   Comment mod-max A on 6" curb with RW max VC for sequencing  and assist lifting walker up/down   1 Step (Curb) CARE Score 2   4 Steps   Type of Assistance Needed Physical assistance;Verbal cues; Adaptive equipment   Amount of Physical Assistance Provided 50%-74%   Comment mod A x 1 with bilat rail ascend fwd/descend bwd non reciprocal pattern    4 Steps CARE Score 2   Therapeutic Interventions   Strengthening seated TE include marches, LAQ, ball squeezes, ankle pumps x 10 reps x 3 sets each side   Other pt was incontinent of BM before and after tx session, PT did pericare at start of tx session and was left in the PCAs care at end of tx to do laura hygiene  required total assist for LB dressing as well   ace wrap on bilat LE applied instead of thigh high TEDs as pt unable to tolerate TEDS due to reported discomfort while PT attempted to don it on pt  RN Heyo notified   Other Comments   Comments ortho BPs taken by PT at start of tx session and entered under vitals by PCA Leonor/Elizabeth    Assessment   Treatment Assessment Skilled PT focused on functional mobility training, ADL and strengthening exercises  At this time due to Bowel incontinence pt required total assist for hygiene and LB dressing  Cont to demo inc difficulty with bed rolling and usually c/o urethral pain during sit to stand transition but was still able to tolerate therapy tasks given rest breaks as needed   Pt will benefit from cont skilled PT intervention to work on strengthening, balance training, functional mobility training and improving activity tolerance to facilitate recovery of functional indep to at least S level  Barriers to Discharge Inaccessible home environment;Decreased caregiver support   Plan   Treatment/Interventions Functional transfer training;LE strengthening/ROM; Therapeutic exercise; Endurance training;Bed mobility;Gait training;Spoke to nursing;OT   Progress Progressing toward goals   Recommendation   PT Discharge Recommendation   (TBD pending progress)   PT - OK to Discharge No   PT Therapy Minutes   PT Time In 1400   PT Time Out 1530   PT Total Time (minutes) 90   PT Mode of treatment - Individual (minutes) 90   PT Mode of treatment - Concurrent (minutes) 0   PT Mode of treatment - Group (minutes) 0   PT Mode of treatment - Co-treat (minutes) 0   PT Mode of Treatment - Total time(minutes) 90 minutes   PT Cumulative Minutes 330   Therapy Time missed   Time missed?  No

## 2021-01-27 NOTE — PROGRESS NOTES
Occupational Therapy Treatment Note         01/27/21 1000   Pain Assessment   Pain Assessment Tool Pain Assessment not indicated - pt denies pain   Restrictions/Precautions   Precautions Cognitive;Bed/chair alarms; Fall Risk;Supervision on toilet/commode;Pacemaker;Hard of hearing  (now on clear liquid diet; pacemaker restriction  )   Lifestyle   Autonomy "I am really tired"    Putting On/Taking Off Footwear   Type of Assistance Needed Physical assistance   Amount of Physical Assistance Provided Total assistance   Putting On/Taking Off Footwear CARE Score 1   Roll Left and Right   Type of Assistance Needed Physical assistance   Amount of Physical Assistance Provided 50%-74%   Roll Left and Right CARE Score 2   Sit to Lying   Type of Assistance Needed Physical assistance   Amount of Physical Assistance Provided 50%-74%   Sit to Lying CARE Score 2   Lying to Sitting on Side of Bed   Type of Assistance Needed Physical assistance   Amount of Physical Assistance Provided 50%-74%   Lying to Sitting on Side of Bed CARE Score 2   Sit to Stand   Type of Assistance Needed Physical assistance; Adaptive equipment   Amount of Physical Assistance Provided 50%-74%   Comment with RW    Sit to Stand CARE Score 2   Bed-Chair Transfer   Type of Assistance Needed Physical assistance; Adaptive equipment   Amount of Physical Assistance Provided Total assistance   Comment mod assist x1 with guarding of 2nd for safety    Chair/Bed-to-Chair Transfer CARE Score 1   Toileting Hygiene   Type of Assistance Needed Physical assistance; Adaptive equipment   Amount of Physical Assistance Provided Total assistance   Comment mod assist x1 in stance with assist of 2nd for bowel hygiene and clothing  Pt then requires total assist while supine in bed for cleaning 2* bowel incontinence    Toileting Hygiene CARE Score 1   Toilet Transfer   Type of Assistance Needed Physical assistance; Adaptive equipment   Amount of Physical Assistance Provided Total assistance Comment mod assist x1 stand pivot with guarding of 2nd to wide [facility]    Toilet Transfer CARE Score 1   Cognition   Overall Cognitive Status Impaired   Arousal/Participation Alert   Attention Attends with cues to redirect   Orientation Level Oriented to person;Oriented to place;Oriented to situation   Memory Decreased short term memory;Decreased recall of recent events   Following Commands Follows one step commands with increased time or repetition   Activity Tolerance   Activity Tolerance Patient limited by fatigue   Medical Staff Made Aware RN Viktoria Meckel made aware pt's son is requesting phone call after 12:00 this afternoon for medical update, she will speak with Dr Bakari Cornejo about this    Assessment   Treatment Assessment Pt participated in skilled OT tx session focused on sit <> stand, stand pivot transfer, toileting, assessment of vitals during  See above for further details on functional performance  Pt on 2 L upon arrival, RN Viktoria Meckel reports can be trialed on room air  Vitals as follows: supine 111/53, HR 66, 97% 02; sit 129/58, HR 72, 99 02, standing 125/60, HR 86, 94%  On room air pt's 02 91-95%  Pt reports no dizziness/lightheaded today  Pt demonstrating progress in sit <> stand and stand pivot transfers with RW  Pt's bowel incontinence continues to be a barrier to function  Pt will continue to benefit from skilled OT intervention to address sit <> stand, stand pivots using RW, increasing activity tolerance in order to maximize functional independence in ADLS, functional mobility/transfers, while decreasing burden of care  Prognosis Fair   Problem List Decreased strength;Decreased endurance; Impaired balance;Decreased mobility; Decreased cognition   Barriers to Discharge Decreased caregiver support; Inaccessible home environment   Plan   Treatment/Interventions ADL retraining;Functional transfer training; Therapeutic exercise; Endurance training;Patient/family training;Equipment eval/education; Bed mobility; Compensatory technique education   Progress Progressing toward goals   Recommendation   OT Discharge Recommendation   (pending progress )   OT Therapy Minutes   OT Time In 1000   OT Time Out 1130   OT Total Time (minutes) 90   OT Mode of treatment - Individual (minutes) 90   OT Mode of treatment - Concurrent (minutes) 0   OT Mode of treatment - Group (minutes) 0   OT Mode of treatment - Co-treat (minutes) 0   OT Mode of Treatment - Total time(minutes) 90 minutes   OT Cumulative Minutes 370   Therapy Time missed   Time missed?  No

## 2021-01-27 NOTE — CASE MANAGEMENT
Met w/pt and reviewed team update and barriers to dc  Pt stated she feels better today than yesterday and hopes to make improvements with therapy  Following to assist w/dc planning needs

## 2021-01-27 NOTE — PROGRESS NOTES
Progress Note- Albaro Rios 79 y o  female MRN: [id number]    Unit/Bed#: [facility] 307-81 Encounter: [id number]    Assessment and Plan:  Diarrhea  - CT Scan 1/26/21 showed prominent stool throughout the colon, no obstruction  - Likely overflow diarrhea  - One episode of bowel incontinence noted last night, first bowel movement since 1/25  - Benign abdominal exam    Nausea and Vomiting   - No vomiting since 1/25/21  - Continue supportive care  - Clear liquid diet  Anemia  - Hgb 9 2 this morning, improved from 8 3 last night  - No overt bleeding  - Continue to monitor H&H  - Received 1 unit PRBC on 1/26 following rapid response  - Hypotensive episode yesterday more likely related to cardiac issue than GI hemorrhage   - CT scan 1/26/21 negative for hemorrhage  History of Ischemic Colitis  - CT Scan 1/26/21 showing previously seen area of transverse colonic thickening has resolved  - Hx of ischemic colitis with celiac/JETHRO stenoses  Plan:  - Continue bowel regiment of Sennokot and Miralax  - Start Metamucil, she is likely dealing with overflow diarrhea  With aggressive bowel regiment, we are hopeful her nausea will resolve  - Monitor blood counts and transfuse as needed  - Serial abdominal exams  - Continue supportive therapy per primary team   - No urgent need for endoscopy at this time  If there is ronald blood loss please contact the GI fellow on call for possibility of urgent endoscopy      ______________________________________________________________________    Subjective:   Rodrigue Maria was seen today to evaluate progression of diarrhea and possible GI bleed  She reports feeling "dry"  She denies abdominal pain, hematochezia, and hematemesis  outside of feeling dry  She has had reduced oral intake over the past 24 hours due to lack of appetite and hypotensive episode       Medication Administration - last 24 hours from 01/26/2021 1107 to 01/27/2021 1107       Date/Time Order Dose Route Action Action by 01/27/2021 0906 ranolazine (RANEXA) 12 hr tablet 1,000 mg 1,000 mg Oral Given Shirley Pabon, OMER     01/26/2021 2132 ranolazine (RANEXA) 12 hr tablet 1,000 mg 1,000 mg Oral Given Magaly Buchanan, OMER     01/27/2021 0905 metoprolol succinate (TOPROL-XL) 24 hr tablet 50 mg 50 mg Oral Given Shirley Pabon RN     01/27/2021 0905 losartan (COZAAR) tablet 25 mg 25 mg Oral Given Shirley Pabon, RN     01/26/2021 1731 atorvastatin (LIPITOR) tablet 20 mg 20 mg Oral Given Louis Mayorga, RN     01/27/2021 9683 saccharomyces boulardii (FLORASTOR) capsule 250 mg 250 mg Oral Given Shirley Pabon, RN     01/26/2021 1731 saccharomyces boulardii (FLORASTOR) capsule 250 mg 250 mg Oral Given Louis Mayorga, OMER     01/26/2021 1304 prasugrel (EFFIENT) tablet 10 mg 10 mg Oral Given Louis Mayorga, OMER     01/27/2021 0905 senna-docusate sodium (SENOKOT S) 8 6-50 mg per tablet 1 tablet 1 tablet Oral Given Shirley Pabon, OMER     01/26/2021 1731 senna-docusate sodium (SENOKOT S) 8 6-50 mg per tablet 1 tablet 1 tablet Oral Given Lousi Mayorga, OMER     01/27/2021 0906 polyethylene glycol (MIRALAX) packet 17 g 17 g Oral Given Shirley Pabon RN     01/26/2021 1448 polyethylene glycol (MIRALAX) packet 17 g 17 g Oral Given Louis Mayorga, OMER          Objective:     Vitals: Blood pressure 118/57, pulse 69, temperature 97 9 °F (36 6 °C), temperature source Oral, resp  rate 16, height 4' 10" (1 473 m), weight 66 7 kg (147 lb 0 8 oz), SpO2 96 %, not currently breastfeeding  ,Body mass index is 30 73 kg/m²  Intake/Output Summary (Last 24 hours) at 1/27/2021 1107  Last data filed at 1/27/2021 0436  Gross per 24 hour   Intake 710 ml   Output 1455 ml   Net -745 ml       Physical Exam:   General Appearance:   Alert, cooperative, no distress   HEENT:   Normocephalic, atraumatic, anicteric       Neck:  Supple, symmetrical, trachea midline   Lungs:   Clear to auscultation bilaterally; no rales, rhonchi or wheezing; respirations unlabored    Heart[de-identified]   Regular rate and rhythm; no murmur, rub, or gallop  Abdomen:   Soft, non-tender, non-distended; normal bowel sounds; no masses, no organomegaly    Genitalia:   Deferred    Rectal:   Deferred    Extremities:  No cyanosis, clubbing or edema    Pulses:  2+ and symmetric all extremities    Skin:  No jaundice, rashes, or lesions    Lymph nodes:  No palpable cervical lymphadenopathy        Invasive Devices     Peripheral Intravenous Line            Peripheral IV 01/24/21 Right;Ventral (anterior) Forearm 2 days          Drain            Urethral Catheter Other (Comment) 16 Fr  3 days                Lab Results:  No results displayed because visit has over 200 results  Imaging Studies: I have personally reviewed pertinent imaging studies        ---------------------------------------------------  Note Electronically Signed By:    DONNA Zapata-MIN

## 2021-01-27 NOTE — PCC OCCUPATIONAL THERAPY
Occupational Therapy Weekly Team Note    Pt is demonstrating fair progress with occupational therapy and is progressing toward long term goals for ADL, IADL, and functional transfers/mobility  Pts long term goals for ADLs are Supervision with TBD  Pt continues to present with impairments in activity tolerance, endurance, standing balance/tolerance, sitting balance/tolerance, UE strength, UE ROM, GMC, memory, insight, safety , judgement , attention , sequencing , task initiation  and task termination   Occupational performance remains limited by fatigue, pain, decreased caregiver support, risk for falls and home environment  Family training/education will be required prior to D/C  Pt will continue to benefit from skilled acute rehab OT services to address above mentioned barriers and maximize functional independence in baseline areas of occupation to meet established treatment goals with overall decreased burden of care  Plan of care to continue to focus on Self-Care  Cognitive Training  Home Management  Patient / Family Education  Transfer Training  [employer] Training  Therapeutic Exercise  Therapeutic Activity  Anticipate Re-team at this time  Niranjan Huggins

## 2021-01-27 NOTE — PROGRESS NOTES
Physical Medicine and Rehabilitation Progress Note  Heather Godinez 79 y o  female MRN: [id number]  Unit/Bed#: -72 Encounter: [id number]    HPI: Heather Godinez is a 79 y o  female who presented to the [facility] with chest pain and elevated troponins and underwent cardiac cath and stent placement however later developed new onset a-fib and tachy-jillian syndrome requiring pacer placement on 1/19  Course complicated by PNA which was treated with abx and colitis  Chief Complaint: cardiac debility     Subjective: patient notes that she is feeling much better today     ROS: A 10 point ROS was performed; negative except as noted above       Assessment/Plan:      A-fib [facility])  Assessment & Plan  - new onset in acute care   - at home on toprol XL (24 hr) 25 mg qd however was increased to 50 mg after prior hospitalization (IM managing BB)   - started on xarelto 15 mg qdinner in acute care per EP recommendations (CrCl was borderline or below cutoff for 20 mg qdinner dose in acute care therefore EP elected for lower 15 mg dose and while CrCl has been higher at times here in rehab unit will continue to use lower 15 mg dose as CrCl tends to fluctuate above and below cut off and over the last year appears to below cutoff more often than not)  -->currently on hold due to 1 heme + stool out of 3 samples per GI recommendations; will d/w GI when xarelto can be resumed   - OP FU with Dr Pratik Garcia and Bertha Clark (scheduled for 2/18)     CAD (coronary artery disease)  Assessment & Plan  - h/o CABG  - elevated troponins in acute care and patient is s/p LAMINE on 1/11/21  - at home was on zocor 40 mg qpm however was switched to lipitor 20 mg qpm after prior hospitalization   - home asa stopped in acute care per EP recommendations    - continued on home effient 10 mg qd per EP recommendations--> 1 heme + stool out of 3 samples, d/w GI and they have cleared patient to continue effient at this time   - home ranexa increased from 500 mg q12 to 1000 mg q12 after prior hospitalization    - at home on toprol XL (24 hr) 25 mg qd however increased to 50 mg after prior hospitalization (IM managing BB dose while in rehab unit)   - OP FU with Dr Mane Singh and Wendy Simpson (scheduled for 2/18)           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)  Assessment & Plan  - grade 1 DD  - EF 45%  - at home on torsemide 20 mg qd however per patient she takes a 1/2 tab daily however ultimately it appears this was switched to lasix 40 mg qd after prior hospitalization  - chest XR of 1/20 revealed small B/L pleural effusions in acute care (however patient also had PNA in acute care and is s/p abx)  - daily weights, I/O   - IM managing while in rehab unit   - OP FU with Dr Mane Singh and Wendy Simpson (which is scheduled for 2/18)             * 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[facility])  Assessment & Plan  - s/p pacer on 1/19  - FU for device and incision check scheduled for 2/1  - OP FU with Dr Mane Singh and Jamarcus Jeffrey (scheduled for 2/18)     Urinary retention  Assessment & Plan  - urinary retention protocol   - UA completed however does not appear to indicate infxn, UCx finalized and grew 60-69 K cfu/ml of candida and less then 10 K cfu/ml VRE-->d/w IM and they do not recommend treatment given UA and low cfu/ml counts for both the candida (which IM suspects is a contaminant) and the VRE  - dominguez placed on 1/23; plan for TOV on 1/28    Edema of left upper extremity  Assessment & Plan  - in the setting of recent pacer placement   - LUE negative for DVT, may be 2/2 to dependent edema in the setting or recent PPM placement      CKD (chronic kidney disease)  Assessment & Plan  - Cr currently 0 58  - baseline Cr appears to be 0 8-1 0  - IM monitoring     Anemia  Assessment & Plan  - Hg currently 9 2 s/p transfusion   - 1 heme+ stool out of 3 samples  - CT abd/pelvis ordered per IM recommendation to r/o retroperitoneal bleed however this did not show any bleed   - per GI holding xarelto but continue effient at this time; will d/w GI when xarelto can be restarted   - IM and GI monitoring (no plan for endoscopic evaluation while inpt at this time per GI)       Hyponatremia  Assessment & Plan  - now improved to 135 which can be considered low nl   - IM managing     Abnormal liver enzymes  Assessment & Plan  - AST now WNL   - mildly elevated total bilirubin of 1 12 (ULN 1 00) with Alk phos WNL   - GI following     Dysphagia  Assessment & Plan  - mild at level 3 diet  - continue ST      Diarrhea  Assessment & Plan  - h/o IBS  - h/o ischemic colitis in 2019  - C diff negative on 1/18/21  - CT done in acute care suggestive of infectious colitis however this finding is no longer present on present on repeat CT A/P of 1/26  - GI consulted and feel this may be overflow diarrhea 2/2 to stool present in the bowel and GI has placed patient on bowel regimen and at this time have no plans for inpt endoscopic evaluation and recommend OP colonoscopy given h/o of ischemic colits in 2019        PAD (peripheral artery disease) (HCC)  Assessment & Plan  - diffuse PAD with B/L LE occlusive disease  - chronic occlusion of Rt subclavian artery  - stenosis of L subclavian artery, L carotid artery, celiac artery, B/L renal arteries, SMA, JETHRO, L ALBERTO  - home asa stopped in acute care per EP recommendations  - home effient 10 mg qd continued per EP recommendations   - was on zocor 40 mg qpm previously however switched to lipitor 20 mg qpm after last hospitalization   - OP FU with Dr Mónica Henning hypertension  Assessment & Plan  - at home on regimen of norvsasc 5 mg qd however recently increased to 10 mg after prior hospitalization, imdur (24 hr) 60 mg qd however increased to 90 mg after prior hospitalization, toprol XL (24 hr) 25 mg qd however increased to 50 mg after prior hospitalization, and cozaar 50 mg qd (patient also appears to have been on torsemide 20 mg qd however patient states she takes a 1/2 tab qd and ultimately this appears to have been switched to lasix 40 mg after prior hospitalization)   - IM managing      Scheduled Meds:  Current Facility-Administered Medications   Medication Dose Route Frequency Provider Last Rate    acetaminophen  650 mg Oral Q6H PRN Fabio Rucker MD      atorvastatin  20 mg Oral Daily With Maulik Fournier MD      bisacodyl  10 mg Rectal Daily PRN Nanette Mace, DO      lidocaine  1 application Urethral A4F PRN Fabio Rucker MD      losartan  25 mg Oral Daily Wilfredo Womack MD      metoprolol succinate  50 mg Oral Daily Wilfredo Womack MD      ondansetron  4 mg Oral Q6H PRN KATHY Garcia      oxyCODONE  2 5 mg Oral Q4H PRN Wilfredo Womack MD      polyethylene glycol  17 g Oral Daily Nanette Mace, DO      prasugrel  10 mg Oral Daily KATHY Garcia      psyllium  1 packet Oral Daily Guilherme Salomon MD      ranolazine  1,000 mg Oral Q12H Wilfredo Womack MD      saccharomyces boulardii  250 mg Oral BID Fabio Rucker MD      senna-docusate sodium  1 tablet Oral BID Nanette Mace, DO            Incidental findings:     1) abnormalities on echocardiogram while in acute care including but not limited to elevated peak PA pressure: OP FU with Dr Alfonso He and Leandro CHAVEZ     2) abnormalities on EKG while in acute care: in the setting of paced rhythm and patient was evaluated by cards with no further inpt PAULSON/intervention recommended; OP FU with Dr Alfonso He and Leandro CHAVEZ     3) small hiatal hernia: asymptomatic; OP FU with PCP with further testing/treatment and/or specialist referral at PCP's discretion      4) gallstones w/distended gallbladder (no pericholecystatic inflammatory findings per imaging report): asymptomatic; OP FU with PCP with further testing/treatment and/or specialist referral at PCP's discretion      5) non-obstructive 1 mm L kidney stone: asymptomatic; not present on CT A/P of 1/26    6) low lipase level: mildly decreased at 58 (LLN 73) on 1/26 and was 50 on 1/12, evaluated by GI and no further PAULSON/intervention for low lipsie was recommended however CT abd/pelvis was ordered per IM recommendation due to drop in Hg and r/o retroperitoneal bleed which was unrevealing     DVT ppx: xarelto currently on hold per GI recommendations due to heme+ stool (1 out of 3) however will continue with TEDs         Objective:    Functional Update:  Mobility: min-mod  Transfers: max  ADLs: total x 2       Physical Exam:    Vitals:    01/27/21 1602   BP: 127/57   Pulse: 69   Resp: 18   Temp: 98 4 °F (36 9 °C)   SpO2: 93%           General: alert, no apparent distress, cooperative and comfortable  HEENT:  Head: Normal, normocephalic, atraumatic  Eye: Normal external eye, conjunctiva, lidsc cornea  Ears: Normal external ears  Nose: Normal external nose, mucus membranes  CARDIAC:  +S1/2  LUNGS:  no abnormal respiratory pattern, no retractions noted, non-labored breathing   ABDOMEN:  soft NT  EXTREMITIES:  no cyanosis  NEURO:  awake, alert   PSYCH:  mood/affect currently stable        Diagnostic Studies:  CT abdomen pelvis wo contrast   Final Result by Jamarcus Gao MD (01/26 1312)      No signs of intra-abdominal or intrapelvic hemorrhage  No acute inflammatory changes within the abdomen or pelvis                    Workstation performed: [id number]         VAS upper limb venous duplex scan, unilateral/limited   Final Result by Charbel Koroma MD (01/22 2155)          Laboratory:   Results from last 7 days   Lab Units 01/27/21  0545 01/26/21  1048 01/26/21  1028 01/26/21  0551   HEMOGLOBIN g/dL 9 2* 8 3*  --  7 9*   I STAT HEMOGLOBIN g/dl  --   --  7 8*  --    HEMATOCRIT % 28 0* 26 8*  --  24 7*   HEMATOCRIT, ISTAT %  --   --  23*  --    WBC [place]/uL 4 44 4 51  --  5 03     Results from last 7 days   Lab Units 01/27/21  0545 01/26/21  1028 01/26/21  0551 01/25/21  0620   BUN mg/dL 6  --  11 6 SODIUM mmol/L 135*  --  130* 132*   POTASSIUM mmol/L 3 7  --  3 8 3 3*   CHLORIDE mmol/L 102  --  97* 97*   GLUCOSE, ISTAT mg/dl  --  110  --   --    CREATININE mg/dL 0 58*  --  0 82 0 59*   AST U/L  --   --  21  --    ALT U/L  --   --  23  --             This patient was discussed by the Interdisciplinary Team in weekly case conference today  The care of the patient was extensively discussed with all care providers and an appropriate rehabilitation plan was formulated unique for this patient  Barriers were identified preventing progression of therapy and appropriate interventions were discussed with each discipline  Please see the team note for input from all disciplines regarding barriers, intervention, and discharge planning  [ x ] Total time spent: 35 Mins, and greater than 50% of this time was spent counseling/coordinating care

## 2021-01-27 NOTE — PCC SPEECH THERAPY
Pt is currently being followed for dysphagia therapy where pt was admitted to the unit on a dysphagia level 3 diet and thin liquids  Clinical swallow evaluation completed where pt presenting with s/s suggestive of mild oropharyngeal dysphagia characterized by prolonged mastication, mildly decreased bolus formation resulting in oral residual and suspected delayed initiation of swallows with solids  Based on discussion with pt, suspect that pt's baseline may be a combination of regular solids and softer meats (similar to dysphagia level 3 meats) as per her description  However, pt recently had a rapid response called due to change in medical status and is currently NPO  Pt is recommended for skilled ST services during acute rehab stay for dysphagia therapy in order to determine pt's safest and least restrictive diet in order to support PO intake and goal of safely achieving baseline diet once pt is cleared by medical and GI team for dysphagia services to resume

## 2021-01-27 NOTE — PROGRESS NOTES
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)  Try to use left arm for BPs     Hyponatremia  · Resolved today after she got 1 liter NSS 1/26     Acute/chronic systolic/diastolic CHF, LVEF 06%/ZO 1 DD/moderate pulm HTN and mild-mod MR  · Acute CHF was felt to be 2/2 falsh pulm edema from coronary ischemia  · on Cozaar 25mg qd/Toprol  · Was on Lasix 60mg daily = on hold since hypotensive on 1/26 and make sure recovered  · Has some crackles bases (sm bibas effusions CT scan 1/26 unchanged from CXR 1/20 and CT 1/13); mild LE edema is unchanged   · Will likely not need as much diuretic when restarted given cause of CHF was flash pulm edema from coronary ischemia     NSTEMI with hx CAD/CABG; PTCA/LAMINE p LAD and ostial LMCA 1/11/21  · Grafts to D1 and OM1 were 100% occluded and not amenable to intervention   · Continue Ranexa/Effient/Lipitor and Toprol     New onset PAF  · New to Xarelto (price check = $100 00/mo and family OK with this)  · Xarelto on hold for possible EGD/dropping hemoglobin and restart when OK with GI  · Continue Toprol     Significant conversion pause; s/p dual chamber PPM with left poseterior fascicle lead 1/19/21  · Continue pacer precautions  · Watch incision     Enteritis/colitis  · Has hx ischemic colitis 2019/2017/2016 with rectal bleeding and has  celiac/JETHRO stenoses  · GI saw and wanted to continue Imodium scheduled but if sx worsen then possible EGD/c-scope  · C diff negative    · On 1/26, GI saw again   CT done then showed stool throughout colon and they feel was having liquid stool around retained stool   · Imodium has been stopped and Senokot, Miralax ordered     Aspiration PNA  · S/p antibiotic  · Resolved     Dysphagia  · Continue Dysphagia level 3/thins  · ST following     Urine retention  · Had large volumes with straight catheterization and dominguez placed  · PMR checked UA - small leuks, positive nitrite = Cx with 60,000-69,000 Candida  · No fever/leukocytosis therefore observe for now      LUE edema  · Venous doppler was negative for DVT on 1/22/21      Acute anemia  · No reported overt blood in stool  · Stool hemoccults = 1 was positive, 2 were negative  · Denies any abdominal pain and was not tender on palpation  · S/p 1 unit PRBCs 1/26 with improvement   · CT scan of abdomen to R/O RP bleed was negative  · Was for possible EGD today but since improved then on hold      Nausea/vomiting  · On 1/25 had some N/V which have subsided  · will continue Zofran 4mg q6hrs prn       The above assessment and plan was reviewed and updated as determined by my evaluation of the patient on 1/27/2021      Labs:   Results from last 7 days   Lab Units 01/27/21  0545 01/26/21  1048   WBC Thousand/uL 4 44 4 51   HEMOGLOBIN g/dL 9 2* 8 3*   HEMATOCRIT % 28 0* 26 8*   PLATELETS Thousands/uL 230 244     Results from last 7 days   Lab Units 01/27/21  0545 01/26/21  1028 01/26/21  0551   SODIUM mmol/L 135*  --  130*   POTASSIUM mmol/L 3 7  --  3 8   CHLORIDE mmol/L 102  --  97*   CO2 mmol/L 29  --  28   CO2, I-STAT mmol/L  --  29  --    BUN mg/dL 6  --  11   CREATININE mg/dL 0 58*  --  0 82   GLUCOSE, ISTAT mg/dl  --  110  --    CALCIUM mg/dL 8 4  --  8 3                   Review of Scheduled Meds:  Current Facility-Administered Medications   Medication Dose Route Frequency Provider Last Rate    acetaminophen  650 mg Oral Q6H PRN Arsenio Panchal MD      atorvastatin  20 mg Oral Daily With MD Darian Restrepo bisacodyl  10 mg Rectal Daily PRN Bi Francis DO      lidocaine  1 application Urethral N0X PRN Ben Van MD      losartan  25 mg Oral Daily Dorian Larry MD      metoprolol succinate  50 mg Oral Daily Dorian Larry MD      ondansetron  4 mg Oral Q6H PRN KATHY Lorenzo      oxyCODONE  2 5 mg Oral Q4H PRN Dorian Larry MD      polyethylene glycol  17 g Oral Daily Bi Francis DO      prasugrel  10 mg Oral Daily KATHY Lorenzo      psyllium  1 packet Oral Daily Guilherme Brice Rolon MD      ranolazine  1,000 mg Oral Q12H Dorian Larry MD      saccharomyces boulardii  250 mg Oral BID Ben Van MD      senna-docusate sodium  1 tablet Oral BID Bi Francis DO         Subjective/ HPI: Patients overnight issues or events were reviewed with nursing or staff during rounds or morning huddle session  No new or overnight issues  Offers no complaints  ROS:   A 10 point ROS was performed; negative except as noted above  Imaging:     CT abdomen pelvis wo contrast   Final Result by Sheri Sung MD (01/26 1312)      No signs of intra-abdominal or intrapelvic hemorrhage  No acute inflammatory changes within the abdomen or pelvis                    Workstation performed: [id number]         VAS upper limb venous duplex scan, unilateral/limited   Final Result by Jack Berger MD (01/22 2155)          *Labs /Radiology studies reviewed  *Medications reviewed and reconciled as needed  *Please refer to order section for additional ordered labs studies  *Case discussed with primary attending during morning huddle case rounds      Physical Examination:  Vitals:   Vitals:    01/27/21 0021 01/27/21 0436 01/27/21 0824 01/27/21 0905   BP: 125/58 111/50 124/58 118/57   BP Location: Left arm Left arm Left arm    Pulse: 74 70 72 69   Resp: 18 18 16    Temp: 97 9 °F (36 6 °C) 98 5 °F (36 9 °C) 97 9 °F (36 6 °C)    TempSrc: Oral Oral Oral    SpO2: 98% 98% 96%    Weight:       Height:           General Appearance: no distress, conversive  HEENT: PERRLA, conjuctiva normal; oropharynx clear; mucous membranes moist   Neck:  Supple, normal ROM, no JVD  Lungs: BBS with crackles 1/3 up bilaterally, normal respiratory effort, no retractions, expiratory effort normal  CV: regular rate and rhythm; no rubs/murmurs/gallops, PMI normal   ABD: soft; ND/NT; +BS  EXT: mild LE edema with trace lateral thigh edema = unchanged   Skin: normal turgor, normal texture, no rashes  Psych: affect normal, mood normal  Neuro: AAO      The above physical exam was reviewed and updated as determined by my evaluation of the patient on 1/27/2021  Invasive Devices     Peripheral Intravenous Line            Peripheral IV 01/24/21 Right;Ventral (anterior) Forearm 2 days          Drain            Urethral Catheter Other (Comment) 16 Fr  3 days                   VTE Pharmacologic Prophylaxis: Sequential pneumatic compression stocking  Code Status: Level 1 - Full Code  Current Length of Stay: 6 day(s)      Total time spent:  30 minutes with more than 50% spent counseling/coordinating care  Counseling includes discussion with patient re: progress  and discussion with patient of his/her current medical state/information  Coordination of patient's care was performed in conjunction with primary service  Time invested included review of patient's labs, vitals, and management of their comorbidities with continued monitoring  In addition, this patient was discussed with medical team including physician and advanced extenders  The care of the patient was extensively discussed and appropriate treatment plan was formulated unique for this patient  ** Please Note:  voice to text software may have been used in the creation of this document   Although proof errors in transcription or interpretation are a potential of such software**

## 2021-01-27 NOTE — TEAM CONFERENCE
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[facility] )  Principal Problem: 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    Patient Active Problem List    Diagnosis Date Noted    Diarrhea 01/25/2021    Hypokalemia 01/25/2021    Edema of left upper extremity 01/22/2021    Urinary retention 01/22/2021    CAD (coronary artery disease) 01/21/2021    A-fib ([facility] ) 01/21/2021    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    PAD (peripheral artery disease) ([facility] ) 01/21/2021    Dysphagia 01/21/2021    Abnormal liver enzymes 01/21/2021    Hyponatremia 01/21/2021    Anemia 01/21/2021    CKD (chronic kidney disease) 01/21/2021    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[facility] ) 01/08/2021    Essential hypertension 08/22/2017       Physical Therapy:    Weight Bearing Status: Full Weight Bearing  Transfers: Maximum Assistance  Bed Mobility: Total Assistance, Maximum Assistance  Amulation Distance (ft): 20 feet  Ambulation: Moderate Assistance, Minimal Assistance  Assistive Device for Ambulation: Roller Walker  Discharge Recommendations: Home with:  [address] with[de-identified] 24 Hour Supervision, Home Physical Therapy     Pt with increased fatigue and increased confusion with cont encouragement through out session  Pt was able to tolerate amb x10-25 feet with standing rest breaks and WC follow for safety  BLE ace wrapped due to increased swelling noted  Pt main barriers at this time are poor act tolerance, poor engagement and decreased strength   Pt will cont to benefit from cont endurance training, gait training and cont reinforcement for safety with all transfers  Occupational Therapy:  Eating: Supervision  Grooming: Supervision  Bathing: Total Assistance, Assist of 2  Bathing: Total Assistance, Assist of 2  Upper Body Dressing: Moderate Assistance  Lower Body Dressing: Total Assistance, Assist of 2  Toileting: Total Assistance, Assist of 2  Toilet Transfer: Total Assistance, Assist of 2  Cognition: Exceptions to WNL  Cognition: Decreased Memory, Decreased Executive Functions, Decreased Attention, Decreased Comprehension, Decreased Safety  Orientation: Person  Discharge Recommendations: Other(pending pt progress)       Occupational Therapy Weekly Team Note    Pt is demonstrating fair progress with occupational therapy and is progressing toward long term goals for ADL, IADL, and functional transfers/mobility  Pts long term goals for ADLs are Supervision with TBD  Pt continues to present with impairments in activity tolerance, endurance, standing balance/tolerance, sitting balance/tolerance, UE strength, UE ROM, GMC, memory, insight, safety , judgement , attention , sequencing , task initiation  and task termination   Occupational performance remains limited by fatigue, pain, decreased caregiver support, risk for falls and home environment  Family training/education will be required prior to D/C  Pt will continue to benefit from skilled acute rehab OT services to address above mentioned barriers and maximize functional independence in baseline areas of occupation to meet established treatment goals with overall decreased burden of care  Plan of care to continue to focus on Self-Care  Cognitive Training  Home Management  Patient / Family Education  Transfer Training  [employer] Training  Therapeutic Exercise  Therapeutic Activity  Anticipate Re-team at this time  Niranjan Huggins                   Speech Therapy:  Mode of Communication: Verbal  Cognition: Exceptions to WNL  Cognition: Decreased Memory, Decreased Executive Functions, Decreased Attention, Decreased Comprehension, Decreased Safety  Orientation: Person  Swallowing: Exceptions to WNL  Swallowing: Oral Dysphagia, Pharyngeal Dysphagia, Aspiration Risk  Diet Recommendations: Level 3/Denture Soft, Thin  Discharge Recommendations: Other(pending pt progress)  DC Home with[de-identified] (pending pt progress)  Pt is currently being followed for dysphagia therapy where pt was admitted to the unit on a dysphagia level 3 diet and thin liquids  Clinical swallow evaluation completed where pt presenting with s/s suggestive of mild oropharyngeal dysphagia characterized by prolonged mastication, mildly decreased bolus formation resulting in oral residual and suspected delayed initiation of swallows with solids  Based on discussion with pt, suspect that pt's baseline may be a combination of regular solids and softer meats (similar to dysphagia level 3 meats) as per her description  However, pt recently had a rapid response called due to change in medical status and is currently NPO  Pt is recommended for skilled ST services during acute rehab stay for dysphagia therapy in order to determine pt's safest and least restrictive diet in order to support PO intake and goal of safely achieving baseline diet once pt is cleared by medical and GI team for dysphagia services to resume  Nursing Notes:  Appetite: Poor  Diet Type: NPO                      Diet Patient/Family Education Complete: No    Type of Wound (LDA): Wound                    Type of Wound Patient/Family Education: No  Bladder: Catheter  Catheter Type: Saeed  Bladder Patient/Family Education: No  Bowel:  Incontinent     Bowel Patient/Family Education: No  Pain Location/Orientation: Location: Groin  Pain Score: 0                       Hospital Pain Intervention(s): Rest, Repositioned  Pain Patient/Family Education: No  Medication Management/Safety  Medication Patient/Family Education Complete: No(ongoing)    Patient admitted to [facility] with Acute CHF was felt to be 2/2 falsh pulm edema from coronary ischemia  Patient will continue on Cozaar qd/Toprol  pt was on Lasix 60mg daily = on hold since hypotensive on 1/26  NSTEMI with hx CAD/CABG; PTCA/LAMINE p LAD and ostial LMCA 1/11/21- will continue patient on Ranexa/Effient/Lipitor and Toprol  New onset PAF- Continue Xarelto, Toprol  Significant conversion pause; s/p dual chamber PPM with left poseterior fascicle lead 1/19/21  Will to watch incision = dressing removed 1/26  Patient has hx ischemic colitis 2019/2017/2016 with rectal bleeding and has  celiac/JETHRO stenoses - GI saw- CT showed stool throughout colon and they feel was having liquid stool around retained stool in the colon  Imodium has been stopped and Senokot, Miralax ordered  Urine retention- dominguez placed  Acute anemia- Stool hemoccults = 1 pos, 2 neg, administered 1 unit PRBCs, did a CT scan of abdomen to R/O RP bleed  Pt possibly going for EGD 1/27  Nausea/vomiting 1/25- has zofran q6hrs prn  Hypotension- IVF NSS bolus started, but BP not responding, RR was called due to BP difficult to hear, placed on 2L O2, BP is returned to baseline  etio of hypovolemia likely from diuretic (placed on hold 1/25 2/2 freq diarrhea), diarrhea and possibly drop in hemoglobin  Hyponatremia- given NSS  Pt is incontinent of bowel  Pt requires alarms for safety  Pain is controlled by prn tylenol  We will encourage independence with ADLs  We will monitor lab values and vital signs  We will educate on turning and repositioning to prevent skin breakdown  We will perform routine skin checks  We will monitor incision for healing and signs/symptoms of infection  We will increase safety awareness and keep patient free from falls  We will monitor for adequate pain control   We will monitor for constipation and medicate as ordered           Case Management:     Discharge Planning  Living Arrangements: Spouse/significant other  Support Systems: Spouse/significant other, Family members, Children  Assistance Needed: TBD  Type of Current Residence: Private residence(one floor)  Current Home Care Services: No  Pt was participating well but has had some medical issues arise  Pt expects to be able to return home and has been made aware of potential needs for contd therapy services on dc  Following to assist w/dc planning needs and recommendations  Is the patient actively participating in therapies? yes  List any modifications to the treatment plan:     Barriers Interventions   Medical status Gi consulted, potential scope   Endurance, activity tolerance Energy conservation education   Sitting, standing balance Therapy exercises   memory Cueing for memory recall   Incontinent b/b Bowel/bladder program     Is the patient making expected progress toward goals? yes  List any update or changes to goals:     Medical Goals: Patient will be medically stable for discharge to [facility] upon completion of rehab program and Patient will be able to manage medical conditions and comorbid conditions with medications and follow up upon completion of rehab program    Weekly Team Goals:   Rehab Team Goals  ADL Team Goal: Patient will require supervision with ADLs with least restrictive device upon completion of rehab program  Transfer Team Goal: Patient will require supervision with transfers with least restrictive device upon completion of rehab program  Locomotion Team Goal: Patient will require supervision with locomotion with least restrictive device upon completion of rehab program    Discussion: pt presents with the above barriers and experienced medical complications the past two days  Medical team following closely for possible GI issues  Pt resides with spouse and has steps to enter which is presently a barrier  Mod to max for bed mobility, mob a with bed mobility  Pt has done 2 steps at mod a  adls are mod to max a   Speech is following for swallowing but pt is currently npo  Pt was on dysphagia 3 and thins  Anticipated Discharge Date:  reteam [facility] Team Members Present: The following team members are supervising care for this patient and were present during this Weekly Team Conference      Physician: Dr Jitendra Arellano MD  : Abner Gusman MSW  Registered Nurse: Kayleigh Taylor, RN, BSN, [address] [place] [address]  Physical Therapist: MAYRA VelasquezT  Occupational Therapist: Edy Avilez MS, OTR/L, CBIS  Speech Therapist: Wing Gillian MA, CCC-SLP  Other:

## 2021-01-27 NOTE — PCC NURSING
Patient admitted to [facility] with Acute CHF was felt to be 2/2 falsh pulm edema from coronary ischemia  Patient will continue on Cozaar qd/Toprol  pt was on Lasix 60mg daily = on hold since hypotensive on 1/26  NSTEMI with hx CAD/CABG; PTCA/LAMINE p LAD and ostial LMCA 1/11/21- will continue patient on Ranexa/Effient/Lipitor and Toprol  New onset PAF- Continue Xarelto, Toprol  Significant conversion pause; s/p dual chamber PPM with left poseterior fascicle lead 1/19/21  Will to watch incision = dressing removed 1/26  Patient has hx ischemic colitis 2019/2017/2016 with rectal bleeding and has  celiac/JETHRO stenoses - GI saw- CT showed stool throughout colon and they feel was having liquid stool around retained stool in the colon  Imodium has been stopped and Senokot, Miralax ordered  Urine retention- dominguez placed  Acute anemia- Stool hemoccults = 1 pos, 2 neg, administered 1 unit PRBCs, did a CT scan of abdomen to R/O RP bleed  Pt possibly going for EGD 1/27  Nausea/vomiting 1/25- has zofran q6hrs prn  Hypotension- IVF NSS bolus started, but BP not responding, RR was called due to BP difficult to hear, placed on 2L O2, BP is returned to baseline  etio of hypovolemia likely from diuretic (placed on hold 1/25 2/2 freq diarrhea), diarrhea and possibly drop in hemoglobin  Hyponatremia- given NSS  Pt is incontinent of bowel  Pt requires alarms for safety  Pain is controlled by prn tylenol  We will encourage independence with ADLs  We will monitor lab values and vital signs  We will educate on turning and repositioning to prevent skin breakdown  We will perform routine skin checks  We will monitor incision for healing and signs/symptoms of infection  We will increase safety awareness and keep patient free from falls  We will monitor for adequate pain control   We will monitor for constipation and medicate as ordered

## 2021-01-28 PROCEDURE — 97116 GAIT TRAINING THERAPY: CPT

## 2021-01-28 PROCEDURE — 92526 ORAL FUNCTION THERAPY: CPT

## 2021-01-28 PROCEDURE — 99232 SBSQ HOSP IP/OBS MODERATE 35: CPT | Performed by: PHYSICAL MEDICINE & REHABILITATION

## 2021-01-28 PROCEDURE — 97535 SELF CARE MNGMENT TRAINING: CPT

## 2021-01-28 PROCEDURE — 99232 SBSQ HOSP IP/OBS MODERATE 35: CPT | Performed by: INTERNAL MEDICINE

## 2021-01-28 PROCEDURE — 97530 THERAPEUTIC ACTIVITIES: CPT

## 2021-01-28 RX ORDER — METOCLOPRAMIDE 10 MG/1
10 TABLET ORAL
Status: DISCONTINUED | OUTPATIENT
Start: 2021-01-28 | End: 2021-01-29

## 2021-01-28 RX ADMIN — ONDANSETRON 4 MG: 4 TABLET, ORALLY DISINTEGRATING ORAL at 15:30

## 2021-01-28 RX ADMIN — Medication 1 PACKET: at 13:37

## 2021-01-28 RX ADMIN — Medication 250 MG: at 09:39

## 2021-01-28 RX ADMIN — METOPROLOL SUCCINATE 50 MG: 50 TABLET, EXTENDED RELEASE ORAL at 09:39

## 2021-01-28 RX ADMIN — METOCLOPRAMIDE 10 MG: 10 TABLET ORAL at 18:25

## 2021-01-28 RX ADMIN — RANOLAZINE 1000 MG: 500 TABLET, FILM COATED, EXTENDED RELEASE ORAL at 09:40

## 2021-01-28 RX ADMIN — PRASUGREL HYDROCHLORIDE 10 MG: 10 TABLET, FILM COATED ORAL at 13:37

## 2021-01-28 RX ADMIN — RIVAROXABAN 15 MG: 15 TABLET, FILM COATED ORAL at 18:01

## 2021-01-28 RX ADMIN — RANOLAZINE 1000 MG: 500 TABLET, FILM COATED, EXTENDED RELEASE ORAL at 20:20

## 2021-01-28 RX ADMIN — LOSARTAN POTASSIUM 25 MG: 25 TABLET, FILM COATED ORAL at 09:39

## 2021-01-28 NOTE — NURSING NOTE
RN went into room to greet pt for shift change  Pt did not look well, she was pale and refusing to interact with RN  Reported that she did not feel well  She was nauseas  330 Zofran was given  Scheduled Reglan was ordered a few minutes after with zofran d/c-ed  Reglan was given at a later time because the nausea had not subside with the Zofran  When RN came to give the Zofran pt was on the phone with her  begging him to come pick her up  Vanessa Martines then called RN to say what just happened  Eliazar Kerns (dtr) was then called to be let know that our nursing interventions were to continue to monitor and offer relief when possible and provide rest     Pt refused xarelto this evening  Dr Bertram Vanegas was notifed    Had RN give Reglan to help with N and try again soon to give med again

## 2021-01-28 NOTE — PROGRESS NOTES
01/28/21 0830   Pain Assessment   Pain Assessment Tool Pain Assessment not indicated - pt denies pain   Pain Score No Pain   Restrictions/Precautions   Precautions Aspiration;Cognitive; Fall Risk;Supervision on toilet/commode; Saeed; Pacemaker;Contact/isolation   LUE ROM Restriction Range Limitation  (pacemaker precautions)   Cognition   Overall Cognitive Status Impaired   Arousal/Participation Alert; Cooperative   Subjective   Subjective pt was in bed and awake with no c/o pain and was agreeable to have PT   Roll Left and Right   Type of Assistance Needed Physical assistance;Verbal cues; Adaptive equipment   Amount of Physical Assistance Provided 50%-74%   Comment to damien brief   Roll Left and Right CARE Score 2   Lying to Sitting on Side of Bed   Type of Assistance Needed Physical assistance;Verbal cues; Adaptive equipment   Amount of Physical Assistance Provided 75% or more   Lying to Sitting on Side of Bed CARE Score 2   Sit to Stand   Type of Assistance Needed Physical assistance;Verbal cues; Adaptive equipment   Amount of Physical Assistance Provided 25%-49%   Sit to Stand CARE Score 3   Bed-Chair Transfer   Type of Assistance Needed Physical assistance;Verbal cues; Adaptive equipment   Amount of Physical Assistance Provided 25%-49%   Chair/Bed-to-Chair Transfer CARE Score 3   Walk 10 Feet   Type of Assistance Needed Physical assistance;Verbal cues; Adaptive equipment   Amount of Physical Assistance Provided 25%-49%   Walk 10 Feet CARE Score 3   Walk 50 Feet with Two Turns   Type of Assistance Needed Physical assistance;Verbal cues; Adaptive equipment   Amount of Physical Assistance Provided 25%-49%   Comment 65, 70 with RW    Walk 50 Feet with Two Turns CARE Score 3   Assessment   Treatment Assessment Skilled PT focused on functional mobility training with emphasis on proper technique and gait activity tolerance using a RW  Ortho BPs' taken at start of PT session, see vitals for results   Also pt did not have BM incontinence this session   PT will work on strengthening and cont functional mobility training this PM  Pt assisted to recliner at end of tx with all needs within reach and chair alarm on  Barriers to Discharge Inaccessible home environment;Decreased caregiver support   PT Barriers   Functional Limitation Car transfers;Stair negotiation;Ramp negotiation;Standing;Transfers; Walking   Plan   Treatment/Interventions Functional transfer training;Bed mobility;Gait training;Spoke to nursing;OT; Therapeutic exercise; Endurance training   Recommendation   PT Discharge Recommendation Home with skilled therapy   PT Therapy Minutes   PT Time In 0830   PT Time Out 0900   PT Total Time (minutes) 30   PT Mode of treatment - Individual (minutes) 30   PT Mode of treatment - Concurrent (minutes) 0   PT Mode of treatment - Group (minutes) 0   PT Mode of treatment - Co-treat (minutes) 0   PT Mode of Treatment - Total time(minutes) 30 minutes   PT Cumulative Minutes 360

## 2021-01-28 NOTE — PROGRESS NOTES
Progress Note- Rosie Conner 79 y o  female MRN: [id number]    Unit/Bed#: -94 Encounter: [id number]    Assessment and Plan:  Overflow Diarrhea   - Beginning to improve, now having more formed stool    - Continue to monitor stool output  - Benign abdominal exam    Anemia   - Hemoglobin improved today to 9 2 from 8 3  - Continue to monitor H&H  - No overt bleeding    Nausea and vomiting   - Symptoms have resolved  Hx of ischemic colitis  - CT Scan 1/26/21 showing previously seen area of transverse colonic thickening has resolved  Plan:   - Continue bowel regiment as patient seems to be now having formed stool   - Continue to monitor H&H  - Serial abdominal exams  - Continue supportive therapy as per primary team    - No urgent need for endoscopy at this time  If there is ronald blood loss please contact the GI fellow on call for possibility of urgent endoscopy  - GI will sign off at this point and recommend outpatient follow up for elective endoscopy    ______________________________________________________________________    Subjective:   Loli Chambers was seen today and looks improved compared to yesterday  She denies nausea, vomiting, abdominal pain, and hematochezia  She has been having more frequent bowel movements with aggressive bowel regiment  Overnight her stool is more formed compared to previous bowel movements      Medication Administration - last 24 hours from 01/27/2021 1104 to 01/28/2021 1104       Date/Time Order Dose Route Action Action by     01/28/2021 0940 ranolazine (RANEXA) 12 hr tablet 1,000 mg 1,000 mg Oral Given Macarena Tuttle RN     01/27/2021 2101 ranolazine (RANEXA) 12 hr tablet 1,000 mg 1,000 mg Oral Given Belinda Combs RN     01/28/2021 1721 metoprolol succinate (TOPROL-XL) 24 hr tablet 50 mg 50 mg Oral Given Macarena Tuttle RN     01/28/2021 7001 losartan (COZAAR) tablet 25 mg 25 mg Oral Given Macarena Tuttle RN     01/27/2021 1704 atorvastatin (LIPITOR) tablet 20 mg 20 mg Oral Given Maritza Quiñonez     01/28/2021 9569 saccharomyces boulardii (FLORASTOR) capsule 250 mg 250 mg Oral Given Becki Elder RN     01/27/2021 1705 saccharomyces boulardii (FLORASTOR) capsule 250 mg 250 mg Oral Given Maritza Quiñonez     01/27/2021 1211 prasugrel (EFFIENT) tablet 10 mg 10 mg Oral Given Wes Colmenares RN     01/28/2021 6777 senna-docusate sodium (SENOKOT S) 8 6-50 mg per tablet 1 tablet 1 tablet Oral Not Given Becki Elder RN     01/27/2021 1708 senna-docusate sodium (SENOKOT S) 8 6-50 mg per tablet 1 tablet 0 tablet Oral Hold Maritza Quiñonez     01/28/2021 0934 polyethylene glycol (MIRALAX) packet 17 g 17 g Oral Not Given Becki Elder RN     01/27/2021 1212 psyllium (METAMUCIL) 1 packet 1 packet Oral Given Wes Colmenares RN          Objective:     Vitals: Blood pressure 125/59, pulse 67, temperature 98 5 °F (36 9 °C), temperature source Oral, resp  rate 18, height 4' 10" (1 473 m), weight 70 kg (154 lb 4 8 oz), SpO2 90 %, not currently breastfeeding  ,Body mass index is 32 25 kg/m²  Intake/Output Summary (Last 24 hours) at 1/28/2021 1104  Last data filed at 1/28/2021 4197  Gross per 24 hour   Intake 480 ml   Output 580 ml   Net -100 ml       Physical Exam:   General Appearance:   Alert, cooperative, no distress   HEENT:   Normocephalic, atraumatic, anicteric  Neck:  Supple, symmetrical, trachea midline   Lungs:   Clear to auscultation bilaterally; no rales, rhonchi or wheezing; respirations unlabored    Heart[de-identified]   Regular rate and rhythm; no murmur, rub, or gallop     Abdomen:   Soft, non-tender, non-distended; normal bowel sounds; no masses, no organomegaly    Genitalia:   Deferred    Rectal:   Deferred    Extremities:  No cyanosis, clubbing or edema    Pulses:  2+ and symmetric all extremities    Skin:  No jaundice, rashes, or lesions    Lymph nodes:  No palpable cervical lymphadenopathy        Invasive Devices     Peripheral Intravenous Line            Peripheral IV 01/24/21 Right;Ventral (anterior) Forearm 3 days          Drain            Urethral Catheter Other (Comment) 16 Fr  4 days                Lab Results:  No results displayed because visit has over 200 results  Imaging Studies: I have personally reviewed pertinent imaging studies        ---------------------------------------------------  Note Electronically Signed By:    OTILIA Perera

## 2021-01-28 NOTE — PROGRESS NOTES
Internal Medicine Progress Note  Patient: Dev Kahn  Age/sex: 79 y o  female  Medical Record #: [id number]      ASSESSMENT/PLAN: (Interval History)  Dev Kahn is seen and examined and management for following issues:    Hypotension  · Resolved after 1 unit PRBCs and 1 liter NSS  · NOTE: BPs in left arm are higher than right (has multifocal calcified plaque at the left subclavian artery origin and proximal vessel resulting in mild to moderate (50-60%) stenosis   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)   Try to use left arm for BPs     Hyponatremia  · Resolved after she got 1 liter NSS 1/26  · BMP in AM     Acute/chronic systolic/diastolic CHF, LVEF 86%/WR 1 DD/moderate pulm HTN and mild-mod MR  · Acute CHF was felt to be 2/2 falsh pulm edema from coronary ischemia  · on Cozaar 25mg qd/Toprol  · Was on Lasix 60mg daily = on hold since hypotensive on 1/26 and make sure recovered  · Has some crackles bases (sm bibas effusions CT scan 1/26 unchanged from CXR 1/20 and CT 1/13); mild LE edema is unchanged   · Will likely not need as much diuretic when restarted given cause of CHF was flash pulm edema from coronary ischemia  · Will consider placing on Lasix 20mg qd 1/29 = on 40mg qd at home since 1/2020 when she was in with pulm edema    Prior to that she was taking Torsemide prn leg edema     NSTEMI with hx CAD/CABG; PTCA/LAMINE p LAD and ostial LMCA 1/11/21  · Grafts to D1 and OM1 were 100% occluded and not amenable to intervention   · Continue Ranexa/Effient/Lipitor and Toprol     New onset PAF  · New to Xarelto (price check = $100 00/mo and family OK with this)  · Xarelto on hold for possible EGD/dropping hemoglobin and restart when OK with GI  · Continue Toprol     Significant conversion pause; s/p dual chamber PPM with left poseterior fascicle lead 1/19/21  · Continue pacer precautions  · Watch incision     Enteritis/colitis  · Has hx ischemic colitis 2019/2017/2016 with rectal bleeding and has  celiac/JETHRO stenoses  · GI saw and wanted to continue Imodium scheduled but if sx worsen then possible EGD/c-scope  · C diff negative  · On 1/26, GI saw again   CT done then showed stool throughout colon and they feel was having liquid stool around retained stool   · Imodium has been stopped      Aspiration PNA  · S/p antibiotic  · Resolved     Dysphagia  · On clear liquids but will advance diet back to Dysphagia level 3/thins  · ST following     Urine retention  · Had large volumes with straight catheterization and dominguez placed  · PMR checked UA - small leuks, positive nitrite = Cx with 60,000-69,000 Candida  · No fever/leukocytosis therefore observe for now      LUE edema  · Venous doppler was negative for DVT on 1/22/21      Acute anemia  · No reported overt blood in stool  · Stool hemoccults = 1 was positive, 2 were negative  · Denies any abdominal pain and was not tender on palpation  · S/p 1 unit PRBCs 1/26 with improvement   · CT scan of abdomen to R/O RP bleed was negative  · CBC in AM      Nausea/vomiting  · On 1/25 had some N/V which have subsided  · will continue Zofran 4mg q6hrs prn       The above assessment and plan was reviewed and updated as determined by my evaluation of the patient on 1/28/2021      Labs:   Results from last 7 days   Lab Units 01/27/21  0545 01/26/21  1048   WBC Thousand/uL 4 44 4 51   HEMOGLOBIN g/dL 9 2* 8 3*   HEMATOCRIT % 28 0* 26 8*   PLATELETS Thousands/uL 230 244     Results from last 7 days   Lab Units 01/27/21  0545 01/26/21  1028 01/26/21  0551   SODIUM mmol/L 135*  --  130*   POTASSIUM mmol/L 3 7  --  3 8   CHLORIDE mmol/L 102  --  97*   CO2 mmol/L 29  --  28   CO2, I-STAT mmol/L  --  29  --    BUN mg/dL 6  --  11   CREATININE mg/dL 0 58*  --  0 82   GLUCOSE, ISTAT mg/dl  --  110  --    CALCIUM mg/dL 8 4  --  8 3                   Review of Scheduled Meds:  Current Facility-Administered Medications   Medication Dose Route Frequency Provider Last Rate    acetaminophen  650 mg Oral Q6H PRN Carmelina Ogden MD      atorvastatin  20 mg Oral Daily With Rubens Stoddard MD      bisacodyl  10 mg Rectal Daily PRN Michael Perez DO      lidocaine  1 application Urethral H7S PRN Carmelina Ogden MD      losartan  25 mg Oral Daily Yousif Rich MD      metoprolol succinate  50 mg Oral Daily Yousif Rich MD      ondansetron  4 mg Oral Q6H PRN KATHY Membreno      oxyCODONE  2 5 mg Oral Q4H PRN Yousif Rich MD      prasugrel  10 mg Oral Daily KATHY Membreno      psyllium  1 packet Oral Daily Guilherme Michael Ravi MD      ranolazine  1,000 mg Oral Q12H Yousif Rich MD      saccharomyces boulardii  250 mg Oral BID Carmelina Ogden MD         Subjective/ HPI: Patients overnight issues or events were reviewed with nursing or staff during rounds or morning huddle session  No new or overnight issues  Offers no complaints  ROS:   A 10 point ROS was performed; negative except as noted above         *Labs /Radiology studies reviewed  *Medications reviewed and reconciled as needed  *Please refer to order section for additional ordered labs studies  *Case discussed with primary attending during morning huddle case rounds      Physical Examination:  Vitals:   Vitals:    01/27/21 2006 01/28/21 0558 01/28/21 0726 01/28/21 0938   BP: 119/57 123/53 134/60 125/59   BP Location: Left arm Left arm Left arm Left arm   Pulse: 69 75 73 67   Resp: 18 18     Temp: 98 6 °F (37 °C) 98 5 °F (36 9 °C)     TempSrc: Oral Oral     SpO2: 95% 90%     Weight: 70 kg (154 lb 4 8 oz)      Height:           General Appearance: no distress, conversive  HEENT: PERRLA, conjuctiva normal; oropharynx clear; mucous membranes moist   Neck:  Supple, normal ROM, no JVD  Lungs: BBS with bibas crackles, normal respiratory effort, no retractions, expiratory effort normal  CV: regular rate and rhythm; no rubs/murmurs/gallops, PMI normal   ABD: soft; ND/NT; +BS  EXT: mild LE edema  Skin: normal turgor, normal texture, no rashes  Psych: affect normal, mood normal  Neuro: AAO      The above physical exam was reviewed and updated as determined by my evaluation of the patient on 1/28/2021  Invasive Devices     Peripheral Intravenous Line            Peripheral IV 01/24/21 Right;Ventral (anterior) Forearm 3 days          Drain            Urethral Catheter Other (Comment) 16 Fr  4 days                   VTE Pharmacologic Prophylaxis: Sequential pneumatic compression stocking  Code Status: Level 1 - Full Code  Current Length of Stay: 7 day(s)      Total time spent:  30 minutes with more than 50% spent counseling/coordinating care  Counseling includes discussion with patient re: progress  and discussion with patient of his/her current medical state/information  Coordination of patient's care was performed in conjunction with primary service  Time invested included review of patient's labs, vitals, and management of their comorbidities with continued monitoring  In addition, this patient was discussed with medical team including physician and advanced extenders  The care of the patient was extensively discussed and appropriate treatment plan was formulated unique for this patient  ** Please Note:  voice to text software may have been used in the creation of this document   Although proof errors in transcription or interpretation are a potential of such software**

## 2021-01-28 NOTE — PROGRESS NOTES
Physical Medicine and Rehabilitation Progress Note  Juany New 79 y o  female MRN: [id number]  Unit/Bed#: [facility] (formerly [facility]) 205-87 Encounter: [id number]    HPI: Juany New is a 79 y o  female who presented to the [facility] with chest pain and elevated troponins and underwent cardiac cath and stent placement however later developed new onset a-fib and tachy-jillian syndrome requiring pacer placement on 1/19  Course complicated by PNA which was treated with abx and colitis  Chief Complaint: cardiac debility     Subjective: patient denies any events overnight     ROS: A 10 point ROS was performed; negative except as noted above       Assessment/Plan:      A-fib [facility])  Assessment & Plan  - new onset in acute care   - at home on toprol XL (24 hr) 25 mg qd however was increased to 50 mg after prior hospitalization (IM managing BB)   - started on xarelto 15 mg qdinner in acute care per EP recommendations (CrCl was borderline or below cutoff for 20 mg qdinner dose in acute care therefore EP elected for lower 15 mg dose and while CrCl has been higher at times here in rehab unit will continue to use lower 15 mg dose as CrCl tends to fluctuate above and below cut off and over the last year appears to below cutoff more often than not) -->was on hold due to 1 heme + stool out of 3 samples per GI recommendations; d/w GI and they cleared patient to resume xarelto 15 mg qdinner on 1/28 (which has been done)   - OP FU with Dr Schuyler Adam and Franco Peguero (scheduled for 2/18)     CAD (coronary artery disease)  Assessment & Plan  - h/o CABG  - elevated troponins in acute care and patient is s/p LAMINE on 1/11/21  - at home was on zocor 40 mg qpm however was switched to lipitor 20 mg qpm after prior hospitalization   - home asa stopped in acute care per EP recommendations    - continued on home effient 10 mg qd per EP recommendations--> 1 heme + stool out of 3 samples, d/w GI and they have cleared patient to continue effient at this time   - home ranexa increased from 500 mg q12 to 1000 mg q12 after prior hospitalization    - at home on toprol XL (24 hr) 25 mg qd however increased to 50 mg after prior hospitalization (IM managing BB dose while in rehab unit)   - OP FU with Dr Tamar Reeder and Francisco Elizabeth (scheduled for 2/18)           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[facility] [address] )  Assessment & Plan  - grade 1 DD  - EF 45%  - at home on torsemide 20 mg qd however per patient she takes a 1/2 tab daily however ultimately it appears this was switched to lasix 40 mg qd after prior hospitalization  - chest XR of 1/20 and CT of 1/26 revealed small B/L pleural effusions in acute care (however patient also had PNA in acute care and is s/p abx)  - daily weights, I/O   - IM managing while in rehab unit   - OP FU with Dr Tamar Reeder and Francisco Elizabeth (which is scheduled for 2/18)             * 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[facility])  Assessment & Plan  - s/p pacer on 1/19  - FU for device and incision check scheduled for 2/1  - OP FU with Dr Tamar Reeder and Betty Cotto (scheduled for 2/18)     Urinary retention  Assessment & Plan  - urinary retention protocol   - UA completed however does not appear to indicate infxn, UCx finalized and grew 60-69 K cfu/ml of candida and less then 10 K cfu/ml VRE-->d/w IM and they do not recommend treatment given UA and low cfu/ml counts for both the candida (which IM suspects is a contaminant) and the VRE  - dominguez placed on 1/23; had planned for TOV on 1/28 however due to limited mobility, monitoring I/O's, and diarrhea will hold off for now and plan for TOV soon     Edema of left upper extremity  Assessment & Plan  - in the setting of recent pacer placement   - LUE negative for DVT, may be 2/2 to dependent edema in the setting or recent PPM placement      CKD (chronic kidney disease)  Assessment & Plan  - Cr currently 0 58  - baseline Cr appears to be 0 8-1 0  - IM monitoring Anemia  Assessment & Plan  - Hg currently 9 2 s/p transfusion   - 1 heme+ stool out of 3 samples  - CT abd/pelvis ordered per IM recommendation to r/o retroperitoneal bleed however this did not show any bleed   - per GI recommendations was holding xarelto but continued effient but on 1/28 GI cleared to resume both which has been done   - IM and GI monitoring (no plan for endoscopic evaluation while inpt at this time per GI)       Hyponatremia  Assessment & Plan  - now improved to 135 which can be considered low nl   - IM managing     Abnormal liver enzymes  Assessment & Plan  - AST now WNL   - mildly elevated total bilirubin of 1 12 (ULN 1 00) with Alk phos WNL   - GI following     Dysphagia  Assessment & Plan  - of note patient was temporarily put on clear liquid diet by GI due to N/V which has resolved and therefore d/w GI and they have cleared patient to resume prior diet    - mild at level 3 diet  - continue speech therapy      Diarrhea  Assessment & Plan  - h/o IBS  - h/o ischemic colitis in 2019  - C diff negative on 1/18/21  - CT done in acute care suggestive of infectious colitis however this finding is no longer present on present on repeat CT A/P of 1/26  - GI consulted and feel this may be overflow diarrhea 2/2 to stool present in the bowel and GI has placed patient on bowel regimen and at this time have no plans for inpt endoscopic evaluation and recommend OP colonoscopy given h/o of ischemic colits in 2019        PAD (peripheral artery disease) (HCC)  Assessment & Plan  - diffuse PAD with B/L LE occlusive disease  - chronic occlusion of Rt subclavian artery  - stenosis of L subclavian artery, L carotid artery, celiac artery, B/L renal arteries, SMA, JETHRO, L ALBERTO  - home asa stopped in acute care per EP recommendations  - home effient 10 mg qd continued per EP recommendations   - was on zocor 40 mg qpm previously however switched to lipitor 20 mg qpm after last hospitalization   - OP FU with Dr Calos Mcneill Essential hypertension  Assessment & Plan  - at home on regimen of norvsasc 5 mg qd however recently increased to 10 mg after prior hospitalization, imdur (24 hr) 60 mg qd however increased to 90 mg after prior hospitalization, toprol XL (24 hr) 25 mg qd however increased to 50 mg after prior hospitalization, and cozaar 50 mg qd (patient also appears to have been on torsemide 20 mg qd however patient states she takes a 1/2 tab qd and ultimately this appears to have been switched to lasix 40 mg after prior hospitalization)   - IM managing      Scheduled Meds:  Current Facility-Administered Medications   Medication Dose Route Frequency Provider Last Rate    acetaminophen  650 mg Oral Q6H PRN Ernesto Alarcon MD      atorvastatin  20 mg Oral Daily With Kimmy Mcgill MD      bisacodyl  10 mg Rectal Daily PRN Ginnaricardo Lamar,       lidocaine  1 application Urethral M2R PRN Ernesto Alarcon MD      losartan  25 mg Oral Daily Franc Geronimo MD      metoprolol succinate  50 mg Oral Daily Franc Geronimo MD      ondansetron  4 mg Oral Q6H PRN KATHY Garcia      oxyCODONE  2 5 mg Oral Q4H PRN Franc Geronimo MD      prasugrel  10 mg Oral Daily KATHY Garcia      psyllium  1 packet Oral Daily Guilherme Theresa Doss MD      ranolazine  1,000 mg Oral Q12H Franc Geronimo MD      rivaroxaban  15 mg Oral Daily With Kimmy Mcgill MD      saccharomyces boulardii  250 mg Oral BID Ernesto Alarcon MD            Incidental findings:     1) abnormalities on echocardiogram while in acute care including but not limited to elevated peak PA pressure: OP FU with Dr Sade Deng and Sloane CHAVEZ     2) abnormalities on EKG while in acute care: in the setting of paced rhythm and patient was evaluated by cards with no further inpt PAULSON/intervention recommended; OP FU with Dr Sade Deng and Mariya Velez     3) small hiatal hernia: asymptomatic; OP FU with PCP with further testing/treatment and/or specialist referral at PCP's discretion      4) gallstones w/distended gallbladder (no pericholecystatic inflammatory findings per imaging report): asymptomatic; OP FU with PCP with further testing/treatment and/or specialist referral at PCP's discretion      5) non-obstructive 1 mm L kidney stone: asymptomatic; not present on CT A/P of 1/26    6) low lipase level: mildly decreased at 58 (LLN 73) on 1/26 and was 50 on 1/12, evaluated by GI and no further PAULSON/intervention for low lipsie was recommended however CT abd/pelvis was ordered per IM recommendation due to drop in Hg and r/o retroperitoneal bleed which was unrevealing     DVT ppx: xarelto          Objective:    Functional Update:  Mobility: min-mod  Transfers: max  ADLs: total x 2       Physical Exam:            General: alert, no apparent distress, cooperative and comfortable  HEENT:  Head: Normal, normocephalic, atraumatic  Eye: Normal external eye, conjunctiva, lidsc cornea  Ears: Normal external ears  Nose: Normal external nose, mucus membranes  CARDIAC:  +S1/2  LUNGS:  no abnormal respiratory pattern, no retractions noted, non-labored breathing   ABDOMEN:  soft NT  EXTREMITIES:  no cyanosis  NEURO:  awake, alert   PSYCH:  mood/affect currently stable        Diagnostic Studies:  CT abdomen pelvis wo contrast   Final Result by Sheri Sung MD (01/26 1312)      No signs of intra-abdominal or intrapelvic hemorrhage  No acute inflammatory changes within the abdomen or pelvis                    Workstation performed: [id number]         VAS upper limb venous duplex scan, unilateral/limited   Final Result by Jack Berger MD (01/22 9712)          Laboratory:   Results from last 7 days   Lab Units 01/27/21  0545 01/26/21  1048 01/26/21  1028 01/26/21  0551   HEMOGLOBIN g/dL 9 2* 8 3*  --  7 9*   I STAT HEMOGLOBIN g/dl  --   --  7 8*  --    HEMATOCRIT % 28 0* 26 8*  --  24 7*   HEMATOCRIT, ISTAT %  --   --  23*  --    WBC [place]ousand/uL 4 44 4 51  --  5 03     Results from last 7 days   Lab Units 01/27/21  0545 01/26/21  1028 01/26/21  0551 01/25/21  0620   BUN mg/dL 6  --  11 6   SODIUM mmol/L 135*  --  130* 132*   POTASSIUM mmol/L 3 7  --  3 8 3 3*   CHLORIDE mmol/L 102  --  97* 97*   GLUCOSE, ISTAT mg/dl  --  110  --   --    CREATININE mg/dL 0 58*  --  0 82 0 59*   AST U/L  --   --  21  --    ALT U/L  --   --  23  --

## 2021-01-28 NOTE — PROGRESS NOTES
01/28/21 1630   Pain Assessment   Pain Assessment Tool 0-10   Pain Score 5   Pain Location/Orientation Location: Abdomen  ("upset stomach")   Hospital Pain Intervention(s) Repositioned   Restrictions/Precautions   Precautions Aspiration;Bed/chair alarms;Cognitive; Fall Risk;Contact/isolation; Saeed; Pacemaker   Swallow Information   Current Risks for Dysphagia & Aspiration General debilitation;Cognitive deficit; Mental status change  (hx cog deficits, GERD, esophagitis)   Current Symptoms/Concerns Difficulty chewing;Decreased oral intake   Current Diet Dysphagia advance; Thin liquid   Baseline Diet Dyphagia advanced;Regular; Thin liquids   Consistencies Assessed and Performance   Materials Admnistered Soft/Level 3; Thin liquid   Oral Stage Mild impaired   Phargngeal Stage Mild impaired;Aspiration risk   Swallow Mechanics Mild delayed;Swallow initation; Appears prompt;Good Larygneal rise;Aspiration risk   Esophageal Concerns Hx GERDS   Recommendations   Risk for Aspiration   (low)   Recommendations Dysphagia treatment   Diet Solid Recommendation Level 3 Dysphagia/ advanced/ soft to chew   Diet Liquid Recommendation Thin liquid   Recommended Form of Meds As desired; As tolerated   General Precautions Aspiration precautions; Feed only when alert;Minimize distractions;Upright as possible for all oral intake;Remain upright for 45 mins after meals; Supervision with meals  (OOB for meals as able, FULL sup, set up)   Compensatory Swallowing Strategies Alternate solids and liquids; External pacing;Effortful swallow;Voluntary throat clear/cough to clear penetration   Results Reviewed with PT/Family/Caregiver;RN   Eating   Type of Assistance Needed Set-up / clean-up;Supervision;Verbal cues   Amount of Physical Assistance Provided No physical assistance   Eating CARE Score 4   Swallow Assessment   Swallow Treatment Assessment Pt seen for skilled speech therapy session targeting dysphagia   Pt previously on clear liquid diet which was changed back to her original dysphagia 3 dental soft diet this afternoon  Pt seen upright in bed in room  Pt expressing that her stomach hurt but agreeable to trying to eat something  Pt stating "I know my stomach is hungry but my mind doesn't want to eat"  Pt showed multiple items on tray- pt agreeable to a few bites of mashed potatoes and a few bites of lemon meringue pie  Pt also consumed a few sips of water- offered her gingerale and apple juice but deferred stating "maybe its all the soda that has my stomach upset"  Pt provided some set up assistance with items in reach- pt able to self feed small bites of food very slowly, good bolus retrieval, adequate oral control, slow oral processing, mildly delayed transfers and swallows  Pt taking long rest breaks between bites- encouraged with some water- drank thin liquids by straw c good bolus retrieval, oral control, functional processing and transfers  No overt s/s aspiration with liquids  Pt deferring further trial and other items offered stating "I just don't want to get sick"  Cleared food items and positioned pt comfortably in bed  At this time, cont dysphagia level 3 diet and thin liquids with FULL supervision due to change in cognition  Aspiration precautions, OOB or fully upright for all PO, set up assistance, slow rate, small bites, alternate food and liquids  At this time, cont to follow pt for skilled dysphagia tx session to assess tolerance of diet and trial upgraded consistencies as appropriate as able and medically stable to maximize swallow function of LRD  Swallow Assessment Prognosis   Prognosis Fair   Prognosis Considerations Co-morbidities; Medical diagnosis; Medical prognosis;Previous level of function;Severity of impairments;New learning ability; Cooperation   SLP Therapy Minutes   SLP Time In 36   SLP Time Out 7643   SLP Total Time (minutes) 20   SLP Mode of treatment - Individual (minutes) 20   SLP Mode of treatment - Concurrent (minutes) 0 SLP Mode of treatment - Group (minutes) 0   SLP Mode of treatment - Co-treat (minutes) 0   SLP Mode of Treatment - Total time(minutes) 20 minutes   SLP Cumulative Minutes 160   Therapy Time missed   Time missed?  Yes   Amount of time missed 10   Reason for time missed Illness

## 2021-01-28 NOTE — PROGRESS NOTES
Occupational Therapy Treatment Note         01/28/21 1005   Pain Assessment   Pain Assessment Tool FLACC   Pain Score   (R heel )   Pain Rating: FLACC (Rest) - Face 0   Pain Rating: FLACC (Rest) - Legs 0   Pain Rating: FLACC (Rest) - Activity 0   Pain Rating: FLACC (Rest) - Cry 1   Pain Rating: FLACC (Rest) - Consolability 0   Score: FLACC (Rest) 1   Pain Rating: FLACC (Activity) - Face 1   Pain Rating: FLACC (Activity) - Legs 1   Pain Rating: FLACC (Activity) - Activity 0   Pain Rating: FLACC (Activity) - Cry 1   Pain Rating: FLACC (Activity) - Consolability 0   Score: FLACC (Activity) 3   Restrictions/Precautions   Precautions Aspiration;Bed/chair alarms;Cognitive; Fall Risk;Supervision on toilet/commode;Pacemaker; Dominguez  (PACEMAKER PRECAUTIONS )   Lifestyle   Autonomy "I am tired today"    Oral Hygiene   Type of Assistance Needed Set-up / clean-up   Amount of Physical Assistance Provided No physical assistance   Comment seated in sink 2* impaired standing balance/tolerance    Oral Hygiene CARE Score 5   Shower/Bathe Self   Type of Assistance Needed Physical assistance   Amount of Physical Assistance Provided 50%-74%   Comment while seated in w/c pt able to complete UB bathing with supervision and bathe upper LEs  Pt requires assist for B/L feet and buttock/perineal area/dominguez care while in bed  Shower/Bathe Self CARE Score 2   Tub/Shower Transfer   Reason Not Assessed Sponge Bath;Safety; Endurance;Balance   Upper Body Dressing   Type of Assistance Needed Physical assistance   Amount of Physical Assistance Provided 75% or more   Comment pt requires assist to thread L UE into sleeve and manage shirt over head and down back while seated in w/c    Upper Body Dressing CARE Score 2   Lower Body Dressing   Type of Assistance Needed Physical assistance   Amount of Physical Assistance Provided Total assistance   Comment assist x1 to don/doff brief    Lower Body Dressing CARE Score 1   Putting On/Taking Off Footwear   Type of Assistance Needed Physical assistance   Amount of Physical Assistance Provided Total assistance   Putting On/Taking Off Footwear CARE Score 1   Roll Left and Right   Type of Assistance Needed Physical assistance; Adaptive equipment   Amount of Physical Assistance Provided 50%-74%   Comment bed rail    Roll Left and Right CARE Score 2   Sit to Lying   Type of Assistance Needed Physical assistance   Amount of Physical Assistance Provided 75% or more   Sit to Lying CARE Score 2   Sit to Stand   Type of Assistance Needed Physical assistance; Adaptive equipment   Amount of Physical Assistance Provided 25%-49%   Comment RW   Sit to Stand CARE Score 3   Bed-Chair Transfer   Type of Assistance Needed Physical assistance; Adaptive equipment   Amount of Physical Assistance Provided 25%-49%   Comment RW into bathroom  then completed stand pivots only 2* fatigue    Chair/Bed-to-Chair Transfer CARE Score 3   Toileting Hygiene   Type of Assistance Needed Physical assistance; Adaptive equipment   Amount of Physical Assistance Provided 75% or more   Comment pt requires assist x1 for clothing management and bowel hygiene while in stance with RW and min assist for [facility] Score 2   Toilet Transfer   Type of Assistance Needed Physical assistance; Adaptive equipment   Amount of Physical Assistance Provided 75% or more   Comment pt able to transfer onto standard toilet with mod assist x1 and RW but required max assist x1 to perform sit to stand from standard toilet  Toilet Transfer CARE Score 2   Cognition   Overall Cognitive Status Impaired   Arousal/Participation Alert; Cooperative   Attention Attends with cues to redirect   Orientation Level Oriented to person   Memory Decreased short term memory;Decreased recall of recent events;Decreased recall of precautions   Following Commands Follows one step commands with increased time or repetition   Activity Tolerance   Activity Tolerance Patient limited by fatigue   Assessment   Treatment Assessment Pt participated in skilled OT tx session focused on ADL routine  See above for further details on functional performance  Pt improving in functional transfers, able to complete sit <> stand and stand pivot with RW with mod/max assist x1 this session  Pt continues to require assist for LB ADLS and toileting  Bowel incontinence continues to be a barrier, as pt requires dominguez care after frequent bowel movements to ensure cleanliness  Pt will continue to benefit from skilled OT intervention to address standing balance/tolerance, sit <> stand transfers using RW, increasing activity tolerance in order to maximize functional independence in ADLS, functional mobility/transfers, while decreasing burden of care  Prognosis Fair   Problem List Decreased strength; Impaired balance;Decreased mobility; Decreased endurance   Barriers to Discharge Decreased caregiver support; Inaccessible home environment   Plan   Treatment/Interventions ADL retraining;Functional transfer training; Therapeutic exercise; Endurance training;Patient/family training;Equipment eval/education; Compensatory technique education   Progress Slow progress, decreased activity tolerance   Recommendation   OT Discharge Recommendation   (pending progress )   OT Therapy Minutes   OT Time In 1005   OT Time Out 1135   OT Total Time (minutes) 90   OT Mode of treatment - Individual (minutes) 90   OT Mode of treatment - Concurrent (minutes) 0   OT Mode of treatment - Group (minutes) 0   OT Mode of treatment - Co-treat (minutes) 0   OT Mode of Treatment - Total time(minutes) 90 minutes   OT Cumulative Minutes 460   Therapy Time missed   Time missed?  No

## 2021-01-28 NOTE — PROGRESS NOTES
01/28/21 1430   Pain Assessment   Pain Assessment Tool 0-10   Pain Score 5   Pain Location/Orientation Location: Abdomen   Pain Onset/Description Onset: Sudden   Hospital Pain Intervention(s) Rest;Repositioned   Sit to Lying   Type of Assistance Needed Physical assistance;Verbal cues; Adaptive equipment   Amount of Physical Assistance Provided 75% or more   Comment HOB elevated   Sit to Lying CARE Score 2   Lying to Sitting on Side of Bed   Type of Assistance Needed Physical assistance;Verbal cues; Adaptive equipment   Amount of Physical Assistance Provided 75% or more   Comment flat bed   Lying to Sitting on Side of Bed CARE Score 2   Assessment   Treatment Assessment Pt was c/o not feeling good with sudden onset of dry heaving when PT came into the room  She was on the bed with elevated HOB and basin in front of her  BP taken at it was at 130/60 with AL at 63  With encouragement pt agreed to do PT however while in the process of getting out of bed started to demo small amplitude body jerking/twitching and when asked how she felt she endorsed stomach pain at 5-6/10 but then pt got irritated with additional questions so unable to further assessed  pt assisted back to bed and she refused to cont with therapy stating " I don't want to throw up while Im there"  RN Laurie notified of pt's symptoms and missing therapy session       PT Therapy Minutes   PT Time In 1430   PT Time Out 1445   PT Total Time (minutes) 15   PT Mode of treatment - Individual (minutes) 15   PT Mode of treatment - Concurrent (minutes) 0   PT Mode of treatment - Group (minutes) 0   PT Mode of treatment - Co-treat (minutes) 0   PT Mode of Treatment - Total time(minutes) 15 minutes   PT Cumulative Minutes 375

## 2021-01-29 ENCOUNTER — APPOINTMENT (INPATIENT)
Dept: RADIOLOGY | Facility: HOSPITAL | Age: 81
DRG: 949 | End: 2021-01-29
Payer: MEDICARE

## 2021-01-29 ENCOUNTER — HOSPITAL ENCOUNTER (INPATIENT)
Facility: HOSPITAL | Age: 81
LOS: 15 days | Discharge: NURSING, REHAB OR LONG-TERM CARE | DRG: 100 | End: 2021-02-13
Attending: INTERNAL MEDICINE | Admitting: ANESTHESIOLOGY
Payer: MEDICARE

## 2021-01-29 VITALS
RESPIRATION RATE: 18 BRPM | BODY MASS INDEX: 32.39 KG/M2 | HEIGHT: 58 IN | OXYGEN SATURATION: 99 % | SYSTOLIC BLOOD PRESSURE: 118 MMHG | HEART RATE: 70 BPM | DIASTOLIC BLOOD PRESSURE: 50 MMHG | WEIGHT: 154.3 LBS | TEMPERATURE: 98.1 F

## 2021-01-29 DIAGNOSIS — I25.10 CAD (CORONARY ARTERY DISEASE): ICD-10-CM

## 2021-01-29 DIAGNOSIS — I48.91 A-FIB (HCC): ICD-10-CM

## 2021-01-29 DIAGNOSIS — N39.0 UTI (URINARY TRACT INFECTION): ICD-10-CM

## 2021-01-29 DIAGNOSIS — I50.40 COMBINED CONGESTIVE SYSTOLIC AND DIASTOLIC HEART FAILURE (HCC): ICD-10-CM

## 2021-01-29 DIAGNOSIS — R52 PAIN: ICD-10-CM

## 2021-01-29 DIAGNOSIS — L29.9 ITCHING: ICD-10-CM

## 2021-01-29 DIAGNOSIS — R56.9 SEIZURE-LIKE ACTIVITY (HCC): Primary | ICD-10-CM

## 2021-01-29 DIAGNOSIS — B37.9 CANDIDIASIS: ICD-10-CM

## 2021-01-29 DIAGNOSIS — R19.7 DIARRHEA: ICD-10-CM

## 2021-01-29 DIAGNOSIS — I48.0 PAROXYSMAL ATRIAL FIBRILLATION (HCC): ICD-10-CM

## 2021-01-29 PROBLEM — G92.8 TOXIC METABOLIC ENCEPHALOPATHY: Status: ACTIVE | Noted: 2021-01-29

## 2021-01-29 PROBLEM — I95.2 HYPOTENSION DUE TO DRUGS: Status: ACTIVE | Noted: 2021-01-29

## 2021-01-29 PROBLEM — E78.5 HYPERLIPIDEMIA: Status: ACTIVE | Noted: 2021-01-29

## 2021-01-29 LAB
ANION GAP SERPL CALCULATED.3IONS-SCNC: 3 MMOL/L (ref 4–13)
ANION GAP SERPL CALCULATED.3IONS-SCNC: 8 MMOL/L (ref 4–13)
APTT PPP: 56 SECONDS (ref 23–37)
BASE EXCESS BLDA CALC-SCNC: 4 MMOL/L (ref -2–3)
BASE EXCESS BLDA CALC-SCNC: 6 MMOL/L (ref -2–3)
BASOPHILS # BLD AUTO: 0.04 THOUSANDS/ΜL (ref 0–0.1)
BASOPHILS NFR BLD AUTO: 1 % (ref 0–1)
BUN SERPL-MCNC: 6 MG/DL (ref 5–25)
BUN SERPL-MCNC: 8 MG/DL (ref 5–25)
CALCIUM SERPL-MCNC: 8.9 MG/DL (ref 8.3–10.1)
CALCIUM SERPL-MCNC: 9.3 MG/DL (ref 8.3–10.1)
CHLORIDE SERPL-SCNC: 101 MMOL/L (ref 100–108)
CHLORIDE SERPL-SCNC: 102 MMOL/L (ref 100–108)
CO2 SERPL-SCNC: 25 MMOL/L (ref 21–32)
CO2 SERPL-SCNC: 29 MMOL/L (ref 21–32)
CREAT SERPL-MCNC: 0.8 MG/DL (ref 0.6–1.3)
CREAT SERPL-MCNC: 0.83 MG/DL (ref 0.6–1.3)
DS:DELIVERY SYSTEM: ABNORMAL
EOSINOPHIL # BLD AUTO: 0.07 THOUSAND/ΜL (ref 0–0.61)
EOSINOPHIL NFR BLD AUTO: 1 % (ref 0–6)
ERYTHROCYTE [DISTWIDTH] IN BLOOD BY AUTOMATED COUNT: 14.9 % (ref 11.6–15.1)
ERYTHROCYTE [DISTWIDTH] IN BLOOD BY AUTOMATED COUNT: 14.9 % (ref 11.6–15.1)
FIO2 GAS DIL.REBREATH: 24 L
GFR SERPL CREATININE-BSD FRML MDRD: 66 ML/MIN/1.73SQ M
GFR SERPL CREATININE-BSD FRML MDRD: 69 ML/MIN/1.73SQ M
GLUCOSE P FAST SERPL-MCNC: 81 MG/DL (ref 65–99)
GLUCOSE SERPL-MCNC: 105 MG/DL (ref 65–140)
GLUCOSE SERPL-MCNC: 106 MG/DL (ref 65–140)
GLUCOSE SERPL-MCNC: 106 MG/DL (ref 65–140)
GLUCOSE SERPL-MCNC: 81 MG/DL (ref 65–140)
GLUCOSE SERPL-MCNC: 82 MG/DL (ref 65–140)
GLUCOSE SERPL-MCNC: 99 MG/DL (ref 65–140)
HCO3 BLDA-SCNC: 29.1 MMOL/L (ref 22–28)
HCO3 BLDA-SCNC: 31 MMOL/L (ref 22–28)
HCT VFR BLD AUTO: 31.3 % (ref 34.8–46.1)
HCT VFR BLD AUTO: 32.1 % (ref 34.8–46.1)
HCT VFR BLD CALC: 25 % (ref 34.8–46.1)
HCT VFR BLD CALC: 28 % (ref 34.8–46.1)
HGB BLD-MCNC: 10.1 G/DL (ref 11.5–15.4)
HGB BLD-MCNC: 10.2 G/DL (ref 11.5–15.4)
HGB BLDA-MCNC: 8.5 G/DL (ref 11.5–15.4)
HGB BLDA-MCNC: 9.5 G/DL (ref 11.5–15.4)
IMM GRANULOCYTES # BLD AUTO: 0.06 THOUSAND/UL (ref 0–0.2)
IMM GRANULOCYTES NFR BLD AUTO: 1 % (ref 0–2)
INR PPP: 2.89 (ref 0.84–1.19)
LYMPHOCYTES # BLD AUTO: 1.66 THOUSANDS/ΜL (ref 0.6–4.47)
LYMPHOCYTES NFR BLD AUTO: 34 % (ref 14–44)
MCH RBC QN AUTO: 30.5 PG (ref 26.8–34.3)
MCH RBC QN AUTO: 30.8 PG (ref 26.8–34.3)
MCHC RBC AUTO-ENTMCNC: 31.8 G/DL (ref 31.4–37.4)
MCHC RBC AUTO-ENTMCNC: 32.3 G/DL (ref 31.4–37.4)
MCV RBC AUTO: 95 FL (ref 82–98)
MCV RBC AUTO: 96 FL (ref 82–98)
MONOCYTES # BLD AUTO: 0.68 THOUSAND/ΜL (ref 0.17–1.22)
MONOCYTES NFR BLD AUTO: 14 % (ref 4–12)
NEUTROPHILS # BLD AUTO: 2.41 THOUSANDS/ΜL (ref 1.85–7.62)
NEUTS SEG NFR BLD AUTO: 49 % (ref 43–75)
NRBC BLD AUTO-RTO: 0 /100 WBCS
PCO2 BLD: 30 MMOL/L (ref 21–32)
PCO2 BLD: 33 MMOL/L (ref 21–32)
PCO2 BLD: 46.1 MM HG (ref 36–44)
PCO2 BLD: 49.3 MM HG (ref 36–44)
PH BLD: 7.41 [PH] (ref 7.35–7.45)
PH BLD: 7.41 [PH] (ref 7.35–7.45)
PLATELET # BLD AUTO: 251 THOUSANDS/UL (ref 149–390)
PLATELET # BLD AUTO: 260 THOUSANDS/UL (ref 149–390)
PMV BLD AUTO: 10.1 FL (ref 8.9–12.7)
PMV BLD AUTO: 10.2 FL (ref 8.9–12.7)
PO2 BLD: 134 MM HG (ref 75–129)
PO2 BLD: 42 MM HG (ref 75–129)
POTASSIUM BLD-SCNC: 3.9 MMOL/L (ref 3.5–5.3)
POTASSIUM BLD-SCNC: 4.2 MMOL/L (ref 3.5–5.3)
POTASSIUM SERPL-SCNC: 3.5 MMOL/L (ref 3.5–5.3)
POTASSIUM SERPL-SCNC: 4.1 MMOL/L (ref 3.5–5.3)
PROTHROMBIN TIME: 30.1 SECONDS (ref 11.6–14.5)
RBC # BLD AUTO: 3.28 MILLION/UL (ref 3.81–5.12)
RBC # BLD AUTO: 3.34 MILLION/UL (ref 3.81–5.12)
SAO2 % BLD FROM PO2: 77 % (ref 60–85)
SAO2 % BLD FROM PO2: 99 % (ref 60–85)
SODIUM BLD-SCNC: 131 MMOL/L (ref 136–145)
SODIUM BLD-SCNC: 132 MMOL/L (ref 136–145)
SODIUM SERPL-SCNC: 133 MMOL/L (ref 136–145)
SODIUM SERPL-SCNC: 135 MMOL/L (ref 136–145)
SPECIMEN SOURCE: ABNORMAL
SPECIMEN SOURCE: ABNORMAL
TROPONIN I SERPL-MCNC: 0.02 NG/ML
WBC # BLD AUTO: 4.92 THOUSAND/UL (ref 4.31–10.16)
WBC # BLD AUTO: 5.19 THOUSAND/UL (ref 4.31–10.16)

## 2021-01-29 PROCEDURE — 84484 ASSAY OF TROPONIN QUANT: CPT | Performed by: PHYSICIAN ASSISTANT

## 2021-01-29 PROCEDURE — 82948 REAGENT STRIP/BLOOD GLUCOSE: CPT

## 2021-01-29 PROCEDURE — 80048 BASIC METABOLIC PNL TOTAL CA: CPT | Performed by: NURSE PRACTITIONER

## 2021-01-29 PROCEDURE — 80048 BASIC METABOLIC PNL TOTAL CA: CPT | Performed by: PHYSICIAN ASSISTANT

## 2021-01-29 PROCEDURE — 99291 CRITICAL CARE FIRST HOUR: CPT | Performed by: PHYSICIAN ASSISTANT

## 2021-01-29 PROCEDURE — 99232 SBSQ HOSP IP/OBS MODERATE 35: CPT | Performed by: PHYSICIAN ASSISTANT

## 2021-01-29 PROCEDURE — 85027 COMPLETE CBC AUTOMATED: CPT | Performed by: PHYSICIAN ASSISTANT

## 2021-01-29 PROCEDURE — 82803 BLOOD GASES ANY COMBINATION: CPT

## 2021-01-29 PROCEDURE — 85610 PROTHROMBIN TIME: CPT | Performed by: PHYSICIAN ASSISTANT

## 2021-01-29 PROCEDURE — 99239 HOSP IP/OBS DSCHRG MGMT >30: CPT | Performed by: PHYSICAL MEDICINE & REHABILITATION

## 2021-01-29 PROCEDURE — 84295 ASSAY OF SERUM SODIUM: CPT

## 2021-01-29 PROCEDURE — 85025 COMPLETE CBC W/AUTO DIFF WBC: CPT | Performed by: NURSE PRACTITIONER

## 2021-01-29 PROCEDURE — 06HY33Z INSERTION OF INFUSION DEVICE INTO LOWER VEIN, PERCUTANEOUS APPROACH: ICD-10-PCS | Performed by: ANESTHESIOLOGY

## 2021-01-29 PROCEDURE — 36600 WITHDRAWAL OF ARTERIAL BLOOD: CPT

## 2021-01-29 PROCEDURE — NC001 PR NO CHARGE: Performed by: PSYCHIATRY & NEUROLOGY

## 2021-01-29 PROCEDURE — 99223 1ST HOSP IP/OBS HIGH 75: CPT | Performed by: INTERNAL MEDICINE

## 2021-01-29 PROCEDURE — 82947 ASSAY GLUCOSE BLOOD QUANT: CPT

## 2021-01-29 PROCEDURE — 84132 ASSAY OF SERUM POTASSIUM: CPT

## 2021-01-29 PROCEDURE — 36556 INSERT NON-TUNNEL CV CATH: CPT | Performed by: ANESTHESIOLOGY

## 2021-01-29 PROCEDURE — NC001 PR NO CHARGE: Performed by: PHYSICAL MEDICINE & REHABILITATION

## 2021-01-29 PROCEDURE — 85014 HEMATOCRIT: CPT

## 2021-01-29 PROCEDURE — 85730 THROMBOPLASTIN TIME PARTIAL: CPT | Performed by: PHYSICIAN ASSISTANT

## 2021-01-29 RX ORDER — OXYCODONE HYDROCHLORIDE 5 MG/1
2.5 TABLET ORAL EVERY 4 HOURS PRN
Status: DISCONTINUED | OUTPATIENT
Start: 2021-01-29 | End: 2021-02-13 | Stop reason: HOSPADM

## 2021-01-29 RX ORDER — ONDANSETRON 2 MG/ML
4 INJECTION INTRAMUSCULAR; INTRAVENOUS EVERY 6 HOURS PRN
Status: DISCONTINUED | OUTPATIENT
Start: 2021-01-29 | End: 2021-02-13 | Stop reason: HOSPADM

## 2021-01-29 RX ORDER — ACETAMINOPHEN 325 MG/1
650 TABLET ORAL EVERY 6 HOURS PRN
Status: DISCONTINUED | OUTPATIENT
Start: 2021-01-29 | End: 2021-02-13 | Stop reason: HOSPADM

## 2021-01-29 RX ORDER — RANOLAZINE 500 MG/1
1000 TABLET, EXTENDED RELEASE ORAL EVERY 12 HOURS SCHEDULED
Status: DISCONTINUED | OUTPATIENT
Start: 2021-01-29 | End: 2021-01-29

## 2021-01-29 RX ORDER — ALBUMIN, HUMAN INJ 5% 5 %
12.5 SOLUTION INTRAVENOUS ONCE
Status: COMPLETED | OUTPATIENT
Start: 2021-01-29 | End: 2021-01-29

## 2021-01-29 RX ORDER — PRASUGREL 10 MG/1
10 TABLET, FILM COATED ORAL DAILY
Status: DISCONTINUED | OUTPATIENT
Start: 2021-01-29 | End: 2021-02-13 | Stop reason: HOSPADM

## 2021-01-29 RX ORDER — METOPROLOL TARTRATE 5 MG/5ML
2.5 INJECTION INTRAVENOUS EVERY 6 HOURS PRN
Status: DISCONTINUED | OUTPATIENT
Start: 2021-01-29 | End: 2021-01-29

## 2021-01-29 RX ORDER — SODIUM CHLORIDE, SODIUM GLUCONATE, SODIUM ACETATE, POTASSIUM CHLORIDE, MAGNESIUM CHLORIDE, SODIUM PHOSPHATE, DIBASIC, AND POTASSIUM PHOSPHATE .53; .5; .37; .037; .03; .012; .00082 G/100ML; G/100ML; G/100ML; G/100ML; G/100ML; G/100ML; G/100ML
500 INJECTION, SOLUTION INTRAVENOUS ONCE
Status: COMPLETED | OUTPATIENT
Start: 2021-01-29 | End: 2021-01-30

## 2021-01-29 RX ORDER — SACCHAROMYCES BOULARDII 250 MG
250 CAPSULE ORAL 2 TIMES DAILY
Status: DISCONTINUED | OUTPATIENT
Start: 2021-01-29 | End: 2021-02-13 | Stop reason: HOSPADM

## 2021-01-29 RX ORDER — LORAZEPAM 2 MG/ML
1 INJECTION INTRAMUSCULAR ONCE
Status: COMPLETED | OUTPATIENT
Start: 2021-01-29 | End: 2021-01-29

## 2021-01-29 RX ORDER — ATORVASTATIN CALCIUM 20 MG/1
20 TABLET, FILM COATED ORAL
Status: DISCONTINUED | OUTPATIENT
Start: 2021-01-29 | End: 2021-02-13 | Stop reason: HOSPADM

## 2021-01-29 RX ORDER — LOSARTAN POTASSIUM 25 MG/1
25 TABLET ORAL DAILY
Status: DISCONTINUED | OUTPATIENT
Start: 2021-01-30 | End: 2021-01-29

## 2021-01-29 RX ORDER — METOPROLOL SUCCINATE 50 MG/1
50 TABLET, EXTENDED RELEASE ORAL DAILY
Status: DISCONTINUED | OUTPATIENT
Start: 2021-01-29 | End: 2021-01-29

## 2021-01-29 RX ORDER — CHLORHEXIDINE GLUCONATE 0.12 MG/ML
15 RINSE ORAL EVERY 12 HOURS SCHEDULED
Status: DISCONTINUED | OUTPATIENT
Start: 2021-01-29 | End: 2021-01-30

## 2021-01-29 RX ADMIN — NOREPINEPHRINE BITARTRATE 4 MCG/MIN: 1 INJECTION, SOLUTION, CONCENTRATE INTRAVENOUS at 18:17

## 2021-01-29 RX ADMIN — CHLORHEXIDINE GLUCONATE 0.12% ORAL RINSE 15 ML: 1.2 LIQUID ORAL at 23:03

## 2021-01-29 RX ADMIN — SODIUM CHLORIDE, SODIUM GLUCONATE, SODIUM ACETATE, POTASSIUM CHLORIDE, MAGNESIUM CHLORIDE, SODIUM PHOSPHATE, DIBASIC, AND POTASSIUM PHOSPHATE 500 ML: .53; .5; .37; .037; .03; .012; .00082 INJECTION, SOLUTION INTRAVENOUS at 23:03

## 2021-01-29 RX ADMIN — METOCLOPRAMIDE 10 MG: 10 TABLET ORAL at 06:06

## 2021-01-29 RX ADMIN — RANOLAZINE 1000 MG: 500 TABLET, FILM COATED, EXTENDED RELEASE ORAL at 08:32

## 2021-01-29 RX ADMIN — ALBUMIN (HUMAN) 12.5 G: 12.5 INJECTION, SOLUTION INTRAVENOUS at 17:37

## 2021-01-29 RX ADMIN — LEVETIRACETAM 1000 MG: 100 INJECTION, SOLUTION INTRAVENOUS at 18:31

## 2021-01-29 RX ADMIN — Medication 1 PACKET: at 08:32

## 2021-01-29 RX ADMIN — LOSARTAN POTASSIUM 25 MG: 25 TABLET, FILM COATED ORAL at 08:32

## 2021-01-29 RX ADMIN — NOREPINEPHRINE BITARTRATE 3 MCG/MIN: 1 INJECTION, SOLUTION, CONCENTRATE INTRAVENOUS at 21:29

## 2021-01-29 RX ADMIN — Medication 250 MG: at 08:31

## 2021-01-29 RX ADMIN — LORAZEPAM 1 MG: 2 INJECTION INTRAMUSCULAR; INTRAVENOUS at 17:11

## 2021-01-29 NOTE — SPEECH THERAPY NOTE
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[facility] )    Dysphagia    Anemia    Urinary retention    Hyperlipidemia      Past Medical History  Past Medical History:   Diagnosis Date    Anal fissure     Cardiac disorder     Cognitive changes 12/23/2020    Esophageal reflux     Esophagitis, reflux     Hemorrhoids     Hepatic hemangioma     Last Assessed: 1/13/2015    Herpes zoster     History of colonic polyps     Hypertension     Ischemic colitis ([facility] )     Lumbar herniated disc     Mal[facility] without specification of [facility]     Nephrolithiasis     L  Lithotripsy    Nontoxic single thyroid nodule     Last Assessed: 1/13/2015    Osteoarthritis     Overactive bladder     Raynaud disease     Respiratory system disease     Sjogren's disease ([facility] [facility] )     Spinal stenosis     PONCHO (stress urinary incontinence, female)     Uterovaginal prolapse     Grade I-II       Past Surgical History  Past Surgical History:   Procedure Laterality Date    APPENDECTOMY  1947    CARDIAC SURGERY      CABG    CATARACT EXTRACTION Bilateral     COLONOSCOPY  2012    Fiberoptic    COLONOSCOPY      Resolved: 2006 - 2012 5 year f/u    CORONARY ANGIOPLASTY WITH STENT PLACEMENT      CORONARY ARTERY BYPASS GRAFT      Resolved: 2012    ESOPHAGOGASTRODUODENOSCOPY  2012    Diagnostic    HEMORROIDECTOMY      KNEE SURGERY      LITHOTRIPSY      Renal    MALIGNANT SKIN LESION EXCISION      Face; Resolved: 2004    [place] ESOPHAGOGASTRODUODENOSCOPY TRANSORAL DIAGNOSTIC N/A 4/13/2016    Procedure: EGD AND COLONOSCOPY;  Surgeon: Felisa Rojas MD;  [facility];   Service: Gastroenterology    RENAL ARTERY STENT      SKIN LESION EXCISION      Scalp    SOFT TISSUE TUMOR RESECTION      Shoulder; Resolved: 1995    THROMBOLYSIS      Postoperative Thrombolysis PTCA    TONSILLECTOMY      Resolved: 1944       Summary   Order received, chart reviewed  Upon arrival to the pt's room she was initially responsive but then began not responding w possible sz activity  Gazing upward, twitching movements  Pt not appropriate for intervention at this time  Will re attempt when appropriate/able  Current Medical Status  Pt is a 79 y o  female who [facility] from the [facility] follow a rapid response today 1/29/2021  Pt w/ [facility] 1/7/20201 chest pain and elevated troponins and underwent cardiac cath and stent placement however later developed new onset a-fib and tachy-jillian syndrome  She was transferred to [facility] 1/17/2021  requiring pacer placement on 1/19  Course complicated by PNA which was treated with abx and colitis  Patient was transferred to acute rehab on 1/21/21 however on 1/29/21 patient developed seizure like activity and was transferred to acute care for further management

## 2021-01-29 NOTE — CONSULTS
Reason for Consult / Principal Problem: Stroke Alert   Time last known well: 9:30  Stroke alert called:  11:40  Neurology stroke alert response:  Immediate  Hx and PE limited by: Altered mental status aphasia  Review of previous medical records was completed     Stat Ct head ordered  Concern for Seizure like activity          Inpatient consult to Neurology     Date/Time 1/29/2021 1:24 PM     Performed by  Marita Schofield MD     Authorized by Jed Avendaño PA-C

## 2021-01-29 NOTE — PROGRESS NOTES
01/29/21 1000   Therapy Time missed   Time missed?  Yes   Amount of time missed 120   Reason for time missed Medical hold;Illness  (per medical team )

## 2021-01-29 NOTE — PROGRESS NOTES
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)   Try to use left arm for BPs  · This AM 1/29, BPs initially soft and back up to 691-762 systolic manually on left arm  · Toprol held this AM but Cozaar was given  · Likely needs a little IVF but needs to get an IV = very difficult stick --> ICU couldn't get IV in on 1/27 even with US machine so may need a PICC --> IV team to come up  · Will stop Cozaar for now and take Toprol down to 25mg qam     Hyponatremia  · Resolved after she got 1 liter NSS 1/26     Acute/chronic systolic/diastolic CHF, LVEF 35%/NF 1 DD/moderate pulm HTN and mild-mod MR  · Acute CHF was felt to be 2/2 falsh pulm edema from coronary ischemia  · on Cozaar 25mg qd/Toprol = soft BPs this AM (see above)  · Was on Lasix 60mg daily = on hold since hypotensive/dry on 1/26   · Crackles bases (sm bibas effusions CT scan 1/26 unchanged from CXR 1/20 and CT 1/13); mild LE edema is unchanged since admission to [facility]  · Will likely not need as much diuretic when restarted given cause of CHF was flash pulm edema from coronary ischemia  · When restart diuretic, to consider placing on Lasix 20mg qd = on 40mg qd at home since 1/2020 when she was in with pulm edema (prior to stent placement)    Prior to that, she was taking Torsemide prn leg edema     NSTEMI with hx CAD/CABG; PTCA/LAMINE p LAD and ostial LMCA 1/11/21  · Grafts to D1 and OM1 were 100% occluded and not amenable to intervention · Continue Ranexa/Effient/Lipitor and Toprol     New onset PAF  · New to Xarelto (price check = $100 00/mo and family OK with this)  · Chidi Benson on hold for possible EGD/dropping hemoglobin --> restarted 1/28 since was OK with GI per Dr Margarita Marcial  · Continue Toprol     Significant conversion pause; s/p dual chamber PPM with left poseterior fascicle lead 1/19/21  · Continue pacer precautions  · Watch incision     Enteritis/colitis  · Has hx ischemic colitis 2019/2017/2016 with rectal bleeding and has  celiac/JETHRO stenoses  · GI saw and wanted to continue Imodium scheduled but if sx worsen then possible EGD/c-scope  · C diff negative  · On 1/26, GI saw again  Luvenia Shells then showed stool throughout colon and they feel was having liquid stool around retained stool   · Imodium has been stopped      Aspiration PNA  · S/p antibiotic  · Resolved     Dysphagia  · diet back to Dysphagia level 3/thins; tolerating  · ST following     Urine retention  · Had large volumes with straight catheterization and dominguez placed  · PMR checked UA - small leuks, positive nitrite = Cx with 60,000-69,000 Candida  · No fever/leukocytosis therefore observing for now      LUE edema  · Venous doppler was negative for DVT on 1/22/21      Acute anemia  · No reported overt blood in stool  · Stool hemoccults = 1 was positive, 2 were negative  · Denies any abdominal pain and was not tender on palpation  · S/p 1 unit PRBCs 1/26 with improvement   · CT scan of abdomen to R/O RP bleed was negative  · Stable but watch since back on Xarelto starting 1/28 and see if hemoglobin holds     Nausea/vomiting  · Had nausea again 1/28 and primary placed her on Reglan and stopped the Zofran       The above assessment and plan was reviewed and updated as determined by my evaluation of the patient on 1/29/2021      Labs:   Results from last 7 days   Lab Units 01/29/21  0547 01/27/21  0545   WBC Thousand/uL 4 92 4 44   HEMOGLOBIN g/dL 10 2* 9 2*   HEMATOCRIT % 32 1* 28 0*   PLATELETS Thousands/uL 260 230     Results from last 7 days   Lab Units 01/29/21  0547 01/27/21  0545 01/26/21  1028   SODIUM mmol/L 135* 135*  --    POTASSIUM mmol/L 3 5 3 7  --    CHLORIDE mmol/L 102 102  --    CO2 mmol/L 25 29  --    CO2, I-STAT mmol/L  --   --  29   BUN mg/dL 6 6  --    CREATININE mg/dL 0 80 0 58*  --    GLUCOSE, ISTAT mg/dl  --   --  110   CALCIUM mg/dL 8 9 8 4  --                    Review of Scheduled Meds:  Current Facility-Administered Medications   Medication Dose Route Frequency Provider Last Rate    acetaminophen  650 mg Oral Q6H PRN Bandar Madera MD      atorvastatin  20 mg Oral Daily With Yancy Armijo MD      bisacodyl  10 mg Rectal Daily PRN Gentry Sensing, DO      lidocaine  1 application Urethral Q0S PRN Bandar Madera MD      losartan  25 mg Oral Daily Hamilton Fuel, MD      metoclopramide  10 mg Oral TID AC Hamilton Fuel, MD      metoprolol succinate  50 mg Oral Daily Hamilton [employer], MD      oxyCODONE  2 5 mg Oral Q4H PRN Hamilton Fuel, MD      prasugrel  10 mg Oral Daily Jen Blend, CRNP      psyllium  1 packet Oral Daily Guilherme Ary Bean MD      ranolazine  1,000 mg Oral Q12H Hamilton Fuel, MD      rivaroxaban  15 mg Oral Daily With Yancy Armijo MD      saccharomyces boulardii  250 mg Oral BID Bandar Madera MD         Subjective/ HPI: Patients overnight issues or events were reviewed with nursing or staff during rounds or morning huddle session  No new or overnight issues  Offers no complaints except tired  ROS:   A 10 point ROS was performed; negative except as noted above         *Labs /Radiology studies reviewed  *Medications reviewed and reconciled as needed  *Please refer to order section for additional ordered labs studies  *Case discussed with primary attending during morning huddle case rounds      Physical Examination:  Vitals:   Vitals:    01/29/21 0825 01/29/21 0833 01/29/21 0950 01/29/21 0957   BP: (!) 110/46 (!) 102/46 (!) 100/44 132/50   BP Location: Left arm Left arm Left arm Left arm   Pulse:  71     Resp:       Temp:       TempSrc:       SpO2:       Weight:       Height:           General Appearance: no distress, conversive  HEENT: PERRLA, conjuctiva normal; oropharynx clear; mucous membranes moist   Neck:  Supple, normal ROM, no JVD  Lungs: CTA, normal respiratory effort, no retractions, expiratory effort normal  CV: regular rate and rhythm; no rubs/murmurs/gallops, PMI normal;  PPM site is mildly ecchymotic but incision line is well approximated w/o drainage or erythema   ABD: soft; ND/NT; +BS  EXT: mild leg edema as always  Skin: normal turgor, normal texture, no rashes  Neuro: sleepy and wants to be left alone but ate breakfast well earlier  ESPARZA 4/5  Cooperative but yelled at me when I asked her a question  Speech was clear     The above physical exam was reviewed and updated as determined by my evaluation of the patient on 1/29/2021  Invasive Devices     Drain            Urethral Catheter Other (Comment) 16 Fr  5 days                   VTE Pharmacologic Prophylaxis: Xarelto  Code Status: Level 1 - Full Code  Current Length of Stay: 8 day(s)      Total time spent:  30 minutes with more than 50% spent counseling/coordinating care  Counseling includes discussion with patient re: progress  and discussion with patient of his/her current medical state/information  Coordination of patient's care was performed in conjunction with primary service  Time invested included review of patient's labs, vitals, and management of their comorbidities with continued monitoring  In addition, this patient was discussed with medical team including physician and advanced extenders  The care of the patient was extensively discussed and appropriate treatment plan was formulated unique for this patient  ** Please Note:  voice to text software may have been used in the creation of this document   Although proof errors in transcription or interpretation are a potential of such software**

## 2021-01-29 NOTE — PROGRESS NOTES
Progress Note - Karissa Martínez 1940, 79 y o  female MRN: [id number]    Unit/Bed#: [facility] 903-01 Encounter: [id number]    Primary Care Provider: Marissa Stone MD   Date and time admitted to hospital: 1/29/2021  3:37 PM          Daily Progress Note - 34 Baljeet Fallon 79 y o  female MRN: [id number]  Unit/Bed#: [facility] 533-06 Encounter: [id number]        ----------------------------------------------------------------------------------------  HPI/24hr events:  Patient was a stroke alert this morning for altered mental status that began at 9:30 a m  Ethan Rich Patient was awake but not responding to questions  Patient appeared to have seizure-like motions with a right-sided gaze and rhythmic twitching  Stroke alert CT was negative  Unable to perform MRI secondary to recent LAMINE and ppm placement  EEG performed pending read    Patient triggered a DI alert this afternoon for mental status change  On my examination patient was again right-sided gaze locked with rhythmic twitching lasting approximately 10 minutes  Terminated with 1 mg of Ativan  Unfortunately after abdomen and the administration patient became obtunded with sonorous respirations and hypotension  Lengthy discussion with family regarding goals of care and code status  It is decided to take the patient to the ICU for airway monitoring and potential intubation and mechanical ventilation     ---------------------------------------------------------------------------------------  SUBJECTIVE  None offered    Review of Systems   Unable to perform ROS: Mental status change       ---------------------------------------------------------------------------------------  Assessment and Plan:    Neuro:    Diagnosis: Seizure LIke activity  · Plan: Patient was a stroke alert this morning for altered mental status that began at 9:30 a m   Following Reglan  · Patient was awake but not responding to questions  · During examination patient with right-sided gaze and rhythmic twitching suspicious for seizure  · Stroke alert CT negative  · Unable to perform MRI 2/2 recent LAMINE and PPM placement  · EEG completed and read pending  · This afternoon DI alert triggered for mental status change  On my exam patient was again right-sided gaze with rhythmic twitching which lasted approximately 10 minutes  Terminated with Ativan  Unfortunately after Ativan administration patient became obtunded with sonorous respirations and hypotension  · Patient will be brought to the ICU for airway monitoring and potential intubation  · Keppra 500 b i d  · Airway monitoring  · Low threshold to initiate EMU  · Appreciate neurology input     Diagnosis: toxic metabolic encephalopathy   Plan: Likely related to a combination seizure like activity combined with postictal state  o Monitor mental status      CV:    Diagnosis:  Hypotension due to drugs  o Plan:  Patient with hypotension after Ativan administration  o Currently requiring Levophed   o Hold home antihypertensives  o Titrate Levophed to maintain map of greater than 65    o Access right femoral triple-lumen catheter   Diagnosis:  Hypertension  o Plan:  Hold home antihypertensives   Diagnosis:  Hyperlipidemia  o Plan:  Continue statin   Diagnosis:  Atrial fibrillation  o Plan:  Patient a new onset atrial fibrillation  o Anticoagulated with Xarelto  o Currently a sensing V pacing   Diagnosis:  Chronic combined systolic and diastolic heart failure  o Plan:  Grade 1 diastolic heart failure with an EF of 45%  o Dry weight appears to be approximately 60 5 kg  o Patient on chronic diuretic therapy hold for now  o Continue to monitor no evidence of acute CHF exacerbation      Pulm:   Diagnosis:  Patient with altered mental status  May require intubation for airway protection  o Plan:  Monitor airway  o ABG normal no evidence of decompensation at this time   o Intubated if required          GI:   o NPO for now  o Patient may require speech evaluation      :   o Urinary retention:  Required Saeed catheter placement  o Voiding trial after mental status improves      F/E/N:    No IV fluids   Replete electrolytes   NPO for now      Heme/Onc:   Chronic anemia:  Hemoglobin acceptable at 10 1    Endo:   o Q 6 hour blood sugar checks      ID:   No acute infectious disease needs    MSK/Skin:   o Offload pressure points  o Frequent turning      Disposition: Admit to Critical Care   Code Status: Level 1 - Full Code  ---------------------------------------------------------------------------------------  ICU CORE MEASURES    Prophylaxis   VTE Pharmacologic Prophylaxis: xarelto - consider heparin gtt if mental status remains poor  VTE Mechanical Prophylaxis: sequential compression device  Stress Ulcer Prophylaxis: Prophylaxis Not Indicated         Invasive Devices Review  Invasive Devices     Central Venous Catheter Line            CVC Central Lines 21 Triple Right Femoral less than 1 day          Peripheral Intravenous Line            Peripheral IV 21 Right Hand less than 1 day          Drain            Urethral Catheter Other (Comment) 16 Fr  6 days              Can any invasive devices be discontinued today?  No  ---------------------------------------------------------------------------------------  OBJECTIVE    Vitals   Vitals:    21 1719 21 1800 21 1817 21 1833   BP: (!) 72/47 (!) 81/41  (!) 117/47   Pulse: 67 63  62   Resp:    (!) 9   Temp: 97 8 °F (36 6 °C)  97 6 °F (36 4 °C)    TempSrc:   Oral    SpO2: 96% 97%  100%     Temp (24hrs), Av °F (36 7 °C), Min:97 6 °F (36 4 °C), Max:98 6 °F (37 °C)  Current: Temperature: 97 6 °F (36 4 °C)  Vitals:    21 1833   BP: (!) 117/47   Pulse: 62   Resp: (!) 9   Temp:    SpO2: 100%         Respiratory:  SpO2: SpO2: 100 %, SpO2 Activity:  , SpO2 Device: O2 Device: Nasal cannula       Invasive/non-invasive ventilation settings   Respiratory    Lab Data (Last 4 hours) None         O2/Vent Data (Last 4 hours)    None                Physical Exam  Vitals signs and nursing note reviewed  Constitutional:       Interventions: Nasal cannula in place  Comments: Patient obtunded but appears to be  protecting her airway at this time    Cachectic   HENT:      Head: Normocephalic and atraumatic  Eyes:      General: No scleral icterus  Cardiovascular:      Rate and Rhythm: Normal rate  Comments: Paced rhythm  Pulmonary:      Effort: Pulmonary effort is normal       Breath sounds: Normal breath sounds  Abdominal:      General: Abdomen is flat  Palpations: Abdomen is soft  Skin:     General: Skin is warm  Capillary Refill: Capillary refill takes less than 2 seconds  Coloration: Skin is pale  Neurological:      Mental Status: She is unresponsive  Motor: Abnormal muscle tone present        Comments: Right-sided gaze with rhythmic twitching         Laboratory and Diagnostics:  Results from last 7 days   Lab Units 01/29/21  1746 01/29/21  1156 01/29/21  1146 01/29/21  0547 01/27/21  0545 01/26/21  1048 01/26/21  1028 01/26/21  0551 01/25/21  1150 01/25/21  0620   WBC Thousand/uL  --  5 19  --  4 92 4 44 4 51  --  5 03 6 05 5 46   HEMOGLOBIN g/dL  --  10 1*  --  10 2* 9 2* 8 3*  --  7 9* 8 6* 8 0*   I STAT HEMOGLOBIN g/dl 8 5*  --  9 5*  --   --   --  7 8*  --   --   --    HEMATOCRIT %  --  31 3*  --  32 1* 28 0* 26 8*  --  24 7* 26 7* 25 0*   HEMATOCRIT, ISTAT % 25*  --  28*  --   --   --  23*  --   --   --    PLATELETS Thousands/uL  --  251  --  260 230 244  --  243 245 219   NEUTROS PCT %  --   --   --  49 67 76*  --  67 69 71   MONOS PCT %  --   --   --  14* 13* 10  --  14* 14* 13*     Results from last 7 days   Lab Units 01/29/21  1746 01/29/21  1156 01/29/21  1146 01/29/21  0547 01/27/21  0545 01/26/21  1028 01/26/21  0551 01/25/21  0620 01/24/21  0501 01/23/21  0642   SODIUM mmol/L  --  133*  --  135* 135*  --  130* 132* 131* 133*   POTASSIUM mmol/L --  4 1  --  3 5 3 7  --  3 8 3 3* 3 6 3 6   CHLORIDE mmol/L  --  101  --  102 102  --  97* 97* 97* 101   CO2 mmol/L  --  29  --  25 29  --  28 30 28 25   CO2, I-STAT mmol/L 33*  --  30  --   --  29  --   --   --   --    ANION GAP mmol/L  --  3*  --  8 4  --  5 5 6 7   BUN mg/dL  --  8  --  6 6  --  11 6 6 5   CREATININE mg/dL  --  0 83  --  0 80 0 58*  --  0 82 0 59* 0 57* 0 63   CALCIUM mg/dL  --  9 3  --  8 9 8 4  --  8 3 8 4 7 8* 8 1*   GLUCOSE RANDOM mg/dL  --  99  --  81 86  --  87 94 89 93   ALT U/L  --   --   --   --   --   --  23  --   --   --    AST U/L  --   --   --   --   --   --  21  --   --   --    ALK PHOS U/L  --   --   --   --   --   --  58  --   --   --    ALBUMIN g/dL  --   --   --   --   --   --  2 2*  --   --   --    TOTAL BILIRUBIN mg/dL  --   --   --   --   --   --  1 12*  --   --   --           Results from last 7 days   Lab Units 01/29/21  1156   INR  2 89*   PTT seconds 56*      Results from last 7 days   Lab Units 01/29/21  1156 01/26/21  1049   TROPONIN I ng/mL 0 02 0 04     Results from last 7 days   Lab Units 01/26/21  1048   LACTIC ACID mmol/L 1 2     ABG:    VBG:          Micro        EKG: a sensing - vpacing at 60  Imaging:  No orders to display      I have personally reviewed pertinent reports  and I have personally reviewed pertinent films in PACS    Intake and Output  I/O       01/28 0701 - 01/29 0700 01/29 0701 - 01/30 0700    I V   3 5    IV Piggyback  250    Total Intake  253 5    Urine  100    Stool  0    Total Output  100    Net  +153 5          Unmeasured Stool Occurrence  1 x            Height and Weights         There is no height or weight on file to calculate BMI  Weight (last 2 days)     None            Nutrition       Diet Orders   (From admission, onward)             Start     Ordered    01/29/21 1859  Diet NPO  Diet effective now     Question Answer Comment   Diet Type NPO    RD to adjust diet per protocol?  Yes        01/29/21 1901                    Active Medications  Scheduled Meds:  Current Facility-Administered Medications   Medication Dose Route Frequency Provider Last Rate    acetaminophen  650 mg Oral Q6H PRN Ally Leslie PA-C      atorvastatin  20 mg Oral Daily With [employer]      chlorhexidine  15 mL Swish & Spit Q12H [address] [place], [place]      insulin lispro  1-5 Units Subcutaneous Q6H [address] [place], [place]      [START ON 1/30/2021] levETIRAcetam  500 mg Intravenous Q12H [address] [place], [place]      norepinephrine  1-30 mcg/min Intravenous Titrated Ally Leslie PA-C 4 mcg/min (01/29/21 1817)    ondansetron  4 mg Intravenous Q6H PRN Ally Leslie PA-C      oxyCODONE  2 5 mg Oral Q4H PRN Ally Leslie PA-C      prasugrel  10 mg Oral Daily Ally Leslie PA-C      [START ON 1/30/2021] psyllium  1 packet Oral Daily Ally Leslie PA-C      rivaroxaban  15 mg Oral Daily With [place] Marianela Massey PA-C      saccharomyces boulardii  250 mg Oral BID Beatriz Massey PA-C       Continuous Infusions:  norepinephrine, 1-30 mcg/min, Last Rate: 4 mcg/min (01/29/21 1817)      PRN Meds:   acetaminophen, 650 mg, Q6H PRN  ondansetron, 4 mg, Q6H PRN  oxyCODONE, 2 5 mg, Q4H PRN        Allergies   Allergies   Allergen Reactions    Sulfa Antibiotics Anaphylaxis    Formaldehyde     Codeine Palpitations     ---------------------------------------------------------------------------------------  Advance Directive and Living Will:      Power of :    POLST:    ---------------------------------------------------------------------------------------  Care Time Delivered:   Upon my evaluation, this patient had a high probability of imminent or life-threatening deterioration due to AMS and seizure, which required my direct attention, intervention, and personal management    I have personally provided 30 minutes (530 pm to 6 pm) of critical care time, exclusive of procedures, teaching, family meetings, and any prior time recorded by providers other than myself  Reyna Brooks PA-C      Portions of the record may have been created with voice recognition software  Occasional wrong word or "sound a like" substitutions may have occurred due to the inherent limitations of voice recognition software    Read the chart carefully and recognize, using context, where substitutions have occurred

## 2021-01-29 NOTE — PROGRESS NOTES
Pastoral Care Progress Note    2021  Patient: [place]field [place] : 1940  Admission Date & Time: 2021 1837  MRN: [id number] CSN: [id number]         responded to rapid response alert which turned into a stroke alert   Pt busy with medical team                 21 1200   Clinical Encounter Type   Visited With Patient not available   Crisis Visit Code  (Rapid Response/Stroke Alert)

## 2021-01-29 NOTE — ASSESSMENT & PLAN NOTE
Toni Finn is a 79 y o  female was a stroke alert on 1/29/2021 for AMS which began at 9:30 am, shortly following a dose of Reglan  Patient was awake but not responding to questions  During initial neurology evaluation she was noted to be withdrawal in all 4 extremities equally, was noted to have a right gaze pref and was not cooperative in a formal exam  During evaluation she was noted to have a more tremendously appearance with some suspicion for seizures and lower suspicion for stroke  Plan and recommendations as outlined below:   · Initial stroke alert CT:  Negative for any acute abnormalities  · Will obtain routine EEG:  Rule out seizure activity  · Continue seizure precautions  · Page Neurology for any abnormal/seziure like  movements

## 2021-01-29 NOTE — ASSESSMENT & PLAN NOTE
· Patient was temporarily put on clear liquid diet by GI due to N/V which has resolved and therefore d/w GI and they have cleared patient to resume prior diet    · Back to level 3 diet  · Continue speech therapy  · Keep patient NPO for now, speech eval

## 2021-01-29 NOTE — RAPID RESPONSE
Progress Note - Rapid Response   Joon Mcdowell 79 y o  female MRN: [id number]    Time Called ( Time): 4279  Date Called: 01/29/2021  Level of Care: MS  Room#:   Arrival Time ( Time): 2161  Event End Time ( Time): 1183  Primary reason for call: Acute change in mental status  Interventions:  Airway/Breathing:  O2 Mask/Nasal  Circulation: EKG  Other Treatments: N/A       Assessment:   1  Altered mental status    Plan:   · Blood glucose WNL  Called stroke alert  Patient's attending is at bedside and directly spoke with neurologist   Plan at this time to obtain CT head  No CTA head/neck due to patient being a vasculopath with poor IV access  24g IV in hand only which was placed this morning by an infusion nurse and now low suspicion for large vessel occulsion given patient is now resisting movement in all 4 extremities  Higher likelihood for seizure activity  Plan to obtain spot EEG with neurology consult after CT head  Additional stroke orders entered utilizing stroke order set  Family to be updated by primary team         HPI/Chief Complaint (Background/Situation):   Joon Mcdowell is a 79y o  year old female who presents with acute onset altered mental status  Per attending he just saw patient around 9:30  He describes her as confused, but able to answer questions, wanting to leave the hospital, able to eat and had clear speech  Nursing went to evaluate her just before rapid was called and noted she had tremulous like movements in all her extremities and she would only say 'no' or 'ow'  Upon arrival to the room patient laying in semi-fowlers position in bed  Sinus rhythm on lifepack monitor with  by manual cuff on left arm  Patient has 100% subclavian stenosis on left per nursing unable to obtain vitals on that arm  Patient is unable to answer any ROS or HPI questions at this time   Of note patient had recent pacemaker placement for tachy-jillian syndrome and went into new onset afib and was started on xarelto for [facility] during her recent admission  Historical Information   Past Medical History:   Diagnosis Date    Anal fissure     Cardiac disorder     Cognitive changes 12/23/2020    Esophageal reflux     Esophagitis, reflux     Hemorrhoids     Hepatic hemangioma     Last Assessed: 1/13/2015    Herpes zoster     History of colonic polyps     Hypertension     [facility]schem[facility] colitis [facility] [address] [facility]     Lumbar herniated disc     Malignant neoplasm without specification of site ([facility] [address] )     Nephrolithiasis     L  Lithotripsy    Nontoxic single thyroid nodule     Last Assessed: 1/13/2015    Osteoarthritis     Overactive bladder     Raynaud disease     Respiratory system disease     Sjogren's disease ([facility] [address] [facility]     Spinal stenosis     PONHCO (stress urinary incontinence, female)     Uterovaginal prolapse     Grade I-II     Past Surgical History:   Procedure Laterality Date    APPENDECTOMY  1947    CARDIAC SURGERY      CABG    CATARACT EXTRACTION Bilateral     COLONOSCOPY  2012    Fiberoptic    COLONOSCOPY      Resolved: 2006 - 2012 5 year f/u    CORONARY ANGIOPLASTY WITH STENT PLACEMENT      CORONARY ARTERY BYPASS GRAFT      Resolved: 2012    ESOPHAGOGASTRODUODENOSCOPY  2012    Diagnostic    HEMORROIDECTOMY      KNEE SURGERY      LITHOTRIPSY      Renal    MALIGNANT SKIN LESION EXCISION      Face; Resolved: 2004    TN ESOPHAGOGASTRODUODENOSCOPY TRANSORAL DIAGNOSTIC N/A 4/13/2016    Procedure: EGD AND COLONOSCOPY;  Surgeon: Cody Murray MD;  [facility];   Service: Gastroenterology    RENAL ARTERY STENT      SKIN LESION EXCISION      Scalp    SOFT TISSUE TUMOR RESECTION      Shoulder; Resolved: 1995    THROMBOLYSIS      Postoperative Thrombolysis PTCA    TONSILLECTOMY      Resolved: 1944     Social History   Social History     Substance and Sexual Activity   Alcohol Use Not Currently    Frequency: Monthly or less    Comment: social     Social History     Substance and Sexual Activity   Drug Use No     Social History     Tobacco Use   Smoking Status Never Smoker   Smokeless Tobacco Never Used     Family History: non-contributory    Meds/Allergies   Current Facility-Administered Medications   Medication Dose Route Frequency Provider Last Rate    acetaminophen  650 mg Oral Q6H PRN Bandar Madera MD      atorvastatin  20 mg Oral Daily With [place] Jose Ferrara MD      bisacodyl  10 mg Rectal Daily PRN Gentry Sensing, DO      lidocaine  1 application Urethral A0F PRN Bandar Madera MD      losartan  25 mg Oral Daily [facility], MD      metoprolol succinate  50 mg Oral Daily [facility]      oxyCODONE  2 5 mg Oral Q4H PRN [facility], MD      prasugrel  10 mg Oral Daily Jen Blend, CRNP      psyllium  1 packet Oral Daily Guilherme Ary Bean MD      ranolazine  1,000 mg Oral Q12H [facility], MD      rivaroxaban  15 mg Oral Daily With Yancy Armijo MD      saccharomyces boulardii  250 mg Oral BID Bandar Madera MD              Allergies   Allergen Reactions    Sulfa Antibiotics Anaphylaxis    Formaldehyde     Codeine Palpitations       ROS: Unable 2/2 mental status    Vitals:   Vitals:    01/29/21 1142   BP: 132/80   Pulse: 90   Resp:    Temp:    SpO2: 95%         Physical Exam:  Gen: Tremulous, laying semi-fowlers in bed, in no acute distress  HEENT:trachea midline, without obvious JVD, moist mucous membranes  Chest: RRR, breath sounds cta b/l   Abd:soft nontender  Ext: warm and dry   Neuro: eyes open, with gaze deviation to the right, patient will only say 'ow' and 'no' to questions, no obvious facial droop, slight resistance to antigravity on right upper extremity when arm held, resists movement in left upper extremity, right lower extremity with minimal movement to noxious stimuli, left lower extremity with knee flexion with noxious stimuli      Intake/Output Summary (Last 24 hours) at 1/29/2021 1237  Last data filed at 1/29/2021 0912  Gross per 24 hour Intake 480 ml   Output 225 ml   Net 255 ml       Respiratory    Lab Data (Last 4 hours)    None         O2/Vent Data (Last 4 hours)    None              Invasive Devices     Peripheral Intravenous Line            Peripheral IV 01/29/21 Right Hand less than 1 day          Drain            Urethral Catheter Other (Comment) 16 Fr  5 days                DIAGNOSTIC DATA:    Lab: I have personally reviewed pertinent lab results  CBC:   Results from last 7 days   Lab Units 01/29/21  1156   WBC Thousand/uL 5 19   HEMOGLOBIN g/dL 10 1*   HEMATOCRIT % 31 3*   PLATELETS Thousands/uL 251     CMP:   Results from last 7 days   Lab Units 01/29/21  1146 01/29/21  0547 01/27/21  0545 01/26/21  1028 01/26/21  0551   POTASSIUM mmol/L  --  3 5 3 7  --  3 8   CHLORIDE mmol/L  --  102 102  --  97*   CO2 mmol/L  --  25 29  --  28   CO2, I-STAT mmol/L 30  --   --  29  --    BUN mg/dL  --  6 6  --  11   CREATININE mg/dL  --  0 80 0 58*  --  0 82   CALCIUM mg/dL  --  8 9 8 4  --  8 3   ALK PHOS U/L  --   --   --   --  58   ALT U/L  --   --   --   --  23   AST U/L  --   --   --   --  21   GLUCOSE, ISTAT mg/dl 105  --   --  110  --      PT/INR:   No results found for: PT, INR,   Magnesium: No components found for: MAG,   Phosphorous: No results found for: PHOS    Microbiology:  Lab Results   Component Value Date    BLOODCX No Growth After 5 Days  01/13/2021    BLOODCX No Growth After 5 Days  01/13/2021    BLOODCX No Growth After 5 Days  08/03/2019    URINECX 60,000-69,000 cfu/ml Candida albicans (A) 01/22/2021    URINECX <10,000 cfu/ml Enterococcus  faecium VRE (A) 01/22/2021    URINECX No Growth <1000 cfu/mL 01/14/2021         OUTCOME:   Other: Transfer to [facility] [address] telemetry   Family notified of transfer: yes  Family member contacted: Dr Audrey Shah to contact family   Code Status: Level 1 - Full Code  Critical Care Time: Total Critical Care time spent 25 minutes excluding procedures, teaching and family updates

## 2021-01-29 NOTE — NURSING NOTE
Pt has returned from CT scan  She has remained with a fixed stare to the right  She has still not been able to answer more questions but did state the word "awful," when asked how she was feeling  She has now closed her eyes when we returned from the scan  Awaiting placement with Tele   /50, Pulse 70  99% on 2L NC

## 2021-01-29 NOTE — CONSULTS
NEUROLOGY RESIDENCY CONSULT NOTE     Name: Rosanne Mayorga   Age & Sex: 79 y o  female   MRN: [id number]  Unit/Bed#: [facility] 903-01   Encounter: [id number]  Length of Stay: 0    ASSESSMENT & PLAN     * Seizure-like activity [facility])  Assessment & Plan  Rosanne Mayorga is a 79 y o  female was a stroke alert on 1/29/2021 for AMS which began at 9:30 am, shortly following a dose of Reglan  Patient was awake but not responding to questions  During initial neurology evaluation she was noted to be withdrawal in all 4 extremities equally, was noted to have a right gaze pref and was not cooperative in a formal exam  During evaluation she was noted to have a more tremendously appearance with some suspicion for seizures and lower suspicion for stroke  Plan and recommendations as outlined below:   Initial stroke alert CT:  Negative for any acute abnormalities  Will obtain routine EEG:  Rule out seizure activity  Continue seizure precautions  Page Neurology for any abnormal/seziure like  movements  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[facility])  Assessment & Plan  Pacemaker placed  on 1/19/2021  Management per primary team     A-fib [facility])  Assessment & Plan  Recently diagnosed with a new onset AFib  Continue Xarelto    Essential hypertension  Assessment & Plan  Management per primary team        SUBJECTIVE     Reason for Consult / Principal Problem:  Stroke alert  Hx and PE limited by:  Patient not responding to questions appropriately    HPI: Rosanne Mayorga is a 79 y o   female with medical history consisting of hypertension, CAD status post 4 stents, heart failure with preserved ejection fraction, hyperlipidemia initially presented to [facility] for chest pain and elevation in troponins subsequently underwent cardiac catheterization with stent placement and over the course of her stay developed new onset atrial fibrillation and tachy-Jillian syndrome which required her to have pacemaker placed on 01/19    Her hospitalization course was complicated by pneumonia which required treatment with antibiotics  Patient was recently started on Xarelto for her Afib  She was admitted to rehab facility in [facility]  Patient was rapid response on 01/29/2021 subsequently followed by a stroke alert on 01/29/2021 at 11:30 a m  there was concern for altered mental status and possible stroke  Patient was last well known around 9:30 a m  On 01/29  She was described as being very confused and not answering questions  At baseline patient does respond to questions as well as able to move her extremities  CT head was obtained:  Which was negative for any acute abnormality, CTA head and neck was not able to be performed due to poor IV access given patient's significant peripheral vascular disease  During examination patient was noted to have a more twitching and altered appearance  She was looking to her right side, with no obvious facial droop or focal weakness in any of her extremities  She was not responding to questions however she would say ouch or no and if given mild noxious stim  Inpatient consult to Neurology     Date/Time 1/29/2021 4:26 PM     Performed by  Cher Rose MD     Authorized by Jesus Perez DO              Historical Information   Past Medical History:   Diagnosis Date    Anal fissure     Cardiac disorder     Cognitive changes 12/23/2020    Esophageal reflux     Esophagitis, reflux     Hemorrhoids     Hepatic hemangioma     Last Assessed: 1/13/2015    Herpes zoster     History of colonic polyps     Hypertension     Ischemic colitis ([address]     Lumbar herniated disc     Malignant neoplasm without specification of site ([address] )     Nephrolithiasis     L   Lithotripsy    Nontoxic single thyroid nodule     Last Assessed: 1/13/2015    Osteoarthritis     Overactive bladder     Raynaud disease     Respiratory system disease     Sjogren's disease ([address] )     Spinal stenosis     PONCHO (stress urinary incontinence, female)  Uterovaginal prolapse     Grade I-II     Past Surgical History:   Procedure Laterality Date    APPENDECTOMY  1947    CARDIAC SURGERY      CABG    CATARACT EXTRACTION Bilateral     COLONOSCOPY  2012    Fiberoptic    COLONOSCOPY      Resolved: 2006 - 2012 5 year f/u    CORONARY ANGIOPLASTY WITH STENT PLACEMENT      CORONARY ARTERY BYPASS GRAFT      Resolved: 2012    ESOPHAGOGASTRODUODENOSCOPY  2012    Diagnostic    HEMORROIDECTOMY      KNEE SURGERY      LITHOTRIPSY      Renal    MALIGNANT SKIN LESION EXCISION      Face; Resolved: 2004    KS ESOPHAGOGASTRODUODENOSCOPY TRANSORAL DIAGNOSTIC N/A 4/13/2016    Procedure: EGD AND COLONOSCOPY;  Surgeon: Arnaud Crum MD;  [facility];   Service: Gastroenterology    RENAL ARTERY STENT      SKIN LESION EXCISION      Scalp    SOFT TISSUE TUMOR RESECTION      Shoulder; Resolved: 1995    THROMBOLYSIS      Postoperative Thrombolysis PTCA    TONSILLECTOMY      Resolved: 1944     Social History   Social History     Substance and Sexual Activity   Alcohol Use Not Currently    Frequency: Monthly or less    Comment: social     Social History     Substance and Sexual Activity   Drug Use No     E-Cigarette/Vaping     E-Cigarette/Vaping Substances    Nicotine No     THC No     CBD No     Flavoring No     Other No     Unknown No      Social History     Tobacco Use   Smoking Status Never Smoker   Smokeless Tobacco Never Used     Family History:   Family History   Problem Relation Age of Onset    Heart disease Mother     Hypertension Mother     Osteoporosis Mother     Heart disease Father     Hypertension Father     Ulcerative colitis Family     Colon polyps Family      Meds/Allergies   all current active meds have been reviewed, current meds:   Current Facility-Administered Medications   Medication Dose Route Frequency    acetaminophen (TYLENOL) tablet 650 mg  650 mg Oral Q6H PRN    atorvastatin (LIPITOR) tablet 20 mg  20 mg Oral Daily With Dinner    [START ON 1/30/2021] losartan (COZAAR) tablet 25 mg  25 mg Oral Daily    metoprolol (LOPRESSOR) injection 2 5 mg  2 5 mg Intravenous Q6H PRN    metoprolol succinate (TOPROL-XL) 24 hr tablet 50 mg  50 mg Oral Daily    ondansetron (ZOFRAN) injection 4 mg  4 mg Intravenous Q6H PRN    oxyCODONE (ROXICODONE) IR tablet 2 5 mg  2 5 mg Oral Q4H PRN    prasugrel (EFFIENT) tablet 10 mg  10 mg Oral Daily    [START ON 1/30/2021] psyllium (METAMUCIL) 1 packet  1 packet Oral Daily    ranolazine (RANEXA) 12 hr tablet 1,000 mg  1,000 mg Oral Q12H [address]    rivaroxaban (XARELTO) tablet 15 mg  15 mg Oral Daily With Dinner    saccharomyces boulardii (FLORASTOR) capsule 250 mg  250 mg Oral BID    and PTA meds:   Prior to Admission Medications   Prescriptions Last Dose Informant Patient Reported? Taking? amLODIPine (NORVASC) 10 mg tablet  Self No No   Sig: Take 1 tablet (10 mg total) by mouth daily   apixaban (ELIQUIS) 5 mg   No No   Sig: Take 1 tablet (5 mg total) by mouth 2 (two) times a day   aspirin (ECOTRIN LOW STRENGTH) 81 mg EC tablet  Self Yes No   Sig: Take 81 mg by mouth daily  atorvastatin (LIPITOR) 20 mg tablet  Self No No   Sig: Take 1 tablet (20 mg total) by mouth daily with dinner   docusate sodium (Colace) 100 mg capsule  Self No No   Sig: Take 1 capsule (100 mg total) by mouth 2 (two) times a day   furosemide (LASIX) 40 mg tablet  Self No No   Sig: Take 1 tablet (40 mg total) by mouth daily   isosorbide mononitrate (IMDUR) 30 mg 24 hr tablet   No No   Sig: Take 3 tablets (90 mg total) by mouth daily   metoprolol succinate (TOPROL-XL) 50 mg 24 hr tablet   No No   Sig: Take 1 tablet (50 mg total) by mouth daily   multivitamin (THERAGRAN) TABS  Self Yes No   Sig: Take 1 tablet by mouth daily     nitroglycerin (NITROSTAT) 0 4 mg SL tablet   No No   Sig: PLACE 1 TABLET (0 4 MG TOTAL) UNDER THE TONGUE EVERY 5 (FIVE) MINUTES AS NEEDED FOR CHEST PAIN   pantoprazole (PROTONIX) 40 mg tablet  Self No No   Sig: Take 1 tablet (40 mg total) by mouth daily in the early morning   polyethylene glycol (MIRALAX) 17 g packet  Self No No   Sig: Take 17 g by mouth daily   prasugrel (EFFIENT) tablet  Self No No   Sig: TAKE 1 TABLET EVERY OTHER DAY   ranolazine (RANEXA) 1000 MG SR tablet  Self Yes No   Sig: Take 1,000 mg by mouth 2 (two) times a day   rivaroxaban (XARELTO) 20 mg tablet   No No   Sig: Take 1 tablet (20 mg total) by mouth daily with breakfast   senna (SENOKOT) 8 6 mg  Self No No   Sig: Take 1 tablet (8 6 mg total) by mouth daily at bedtime      Facility-Administered Medications: None     Allergies   Allergen Reactions    Sulfa Antibiotics Anaphylaxis    Formaldehyde     Codeine Palpitations       Review of previous medical records was  completed  Review of Systems   Unable to perform ROS: Mental status change       OBJECTIVE     Patient ID: Marta Bello is a 79 y o  female  Vitals:   Vitals:    21 1549 21 1623   BP: 108/88 129/88   Pulse: 70 71   Resp: 16    Temp: 98 6 °F (37 °C) 97 8 °F (36 6 °C)   SpO2: 95% 95%      There is no height or weight on file to calculate BMI  No intake or output data in the 24 hours ending 21 1629    Temperature:   Temp (24hrs), Av 2 °F (36 8 °C), Min:97 8 °F (36 6 °C), Max:98 6 °F (37 °C)    Temperature: 97 8 °F (36 6 °C)    Invasive Devices: Invasive Devices     Peripheral Intravenous Line            Peripheral IV 21 Right Hand less than 1 day          Drain            Urethral Catheter Other (Comment) 16 Fr  6 days                Physical Exam  Vitals signs and nursing note reviewed  Constitutional:       Appearance: She is well-developed  She is not ill-appearing, toxic-appearing or diaphoretic  HENT:      Head: Normocephalic and atraumatic  Nose: Nose normal    Eyes:      General: No scleral icterus  Right eye: No discharge  Left eye: No discharge  Pupils: Pupils are equal, round, and reactive to light     Neck: Musculoskeletal: Normal range of motion  Pulmonary:      Effort: Pulmonary effort is normal  No respiratory distress  Musculoskeletal: Normal range of motion  General: No swelling, tenderness or deformity  Right lower leg: No edema  Left lower leg: No edema  Skin:     General: Skin is warm and dry  Neurological:      Deep Tendon Reflexes: Strength normal    Psychiatric:         Speech: Speech normal          Behavior: Behavior is uncooperative  Neurologic Exam     Mental Status   Follows 2 step commands  Attention: normal    Speech: speech is normal   Level of consciousness: responsive to painful stimuli  Able to name object  Able to repeat  Patient was awake, and appears alert, was not responding to any questions that were asked  She closed and opened her eyes on command, and squeeze her left hand, but did not follow any other commands  She did not participate in orientation questions  Cranial Nerves   Cranial nerves II through XII intact  CN II   Visual fields full to confrontation  CN III, IV, VI   Pupils are equal, round, and reactive to light  Nystagmus: none   Diplopia: none  Conjugate gaze: present    CN V   Facial sensation intact  CN XI   CN XI normal      CN XII   CN XII normal    Tongue: not atrophic  Fasciculations: absent  Tongue deviation: none  She preferred looking to her right side  Unclear if she was able to cross the midline  Motor Exam   Muscle bulk: normal  Overall muscle tone: normal  Right arm pronator drift: absent  Left arm pronator drift: absent    Strength   Strength 5/5 throughout  Right hamstrin/5  Left hamstrin/5  Right peroneal: 5/5  Left peroneal: 5/5  Right gastroc: 5/5  Left gastroc: 5/5Noted to be able to withdrawal to nox stim on all 4 of her extremities     Did not participate in a formal Motor strength testing      Gait, Coordination, and Reflexes     Tremor   Resting tremor: absent  Intention tremor: absent  Action tremor: absent    Reflexes   Reflexes 2+ except as noted  Right plantar: normal  Left plantar: normalOver all noted to be more tremulous           LABORATORY DATA     Labs: I have personally reviewed pertinent reports  Results from last 7 days   Lab Units 01/29/21  1156 01/29/21  1146 01/29/21  0547 01/27/21  0545 01/26/21  1048   WBC Thousand/uL 5 19  --  4 92 4 44 4 51   HEMOGLOBIN g/dL 10 1*  --  10 2* 9 2* 8 3*   I STAT HEMOGLOBIN g/dl  --  9 5*  --   --   --    HEMATOCRIT % 31 3*  --  32 1* 28 0* 26 8*   HEMATOCRIT, ISTAT %  --  28*  --   --   --    PLATELETS Thousands/uL 251  --  260 230 244   NEUTROS PCT %  --   --  49 67 76*   MONOS PCT %  --   --  14* 13* 10      Results from last 7 days   Lab Units 01/29/21  1156 01/29/21  1146 01/29/21  0547 01/27/21  0545 01/26/21  1028 01/26/21  0551   POTASSIUM mmol/L 4 1  --  3 5 3 7  --  3 8   CHLORIDE mmol/L 101  --  102 102  --  97*   CO2 mmol/L 29  --  25 29  --  28   CO2, I-STAT mmol/L  --  30  --   --  29  --    BUN mg/dL 8  --  6 6  --  11   CREATININE mg/dL 0 83  --  0 80 0 58*  --  0 82   CALCIUM mg/dL 9 3  --  8 9 8 4  --  8 3   ALK PHOS U/L  --   --   --   --   --  58   ALT U/L  --   --   --   --   --  23   AST U/L  --   --   --   --   --  21   GLUCOSE, ISTAT mg/dl  --  105  --   --  110  --               Results from last 7 days   Lab Units 01/29/21  1156   INR  2 89*   PTT seconds 56*     Results from last 7 days   Lab Units 01/26/21  1048   LACTIC ACID mmol/L 1 2     Results from last 7 days   Lab Units 01/29/21  1156 01/26/21  1049   TROPONIN I ng/mL 0 02 0 04       IMAGING & DIAGNOSTIC TESTING     Radiology Results: I have personally reviewed pertinent reports  No orders to display       Other Diagnostic Testing: I have personally reviewed pertinent reports        ACTIVE MEDICATIONS     Current Facility-Administered Medications   Medication Dose Route Frequency    acetaminophen (TYLENOL) tablet 650 mg  650 mg Oral Q6H PRN    atorvastatin (LIPITOR) tablet 20 mg  20 mg Oral Daily With Dinner    [START ON 1/30/2021] losartan (COZAAR) tablet 25 mg  25 mg Oral Daily    metoprolol (LOPRESSOR) injection 2 5 mg  2 5 mg Intravenous Q6H PRN    metoprolol succinate (TOPROL-XL) 24 hr tablet 50 mg  50 mg Oral Daily    ondansetron (ZOFRAN) injection 4 mg  4 mg Intravenous Q6H PRN    oxyCODONE (ROXICODONE) IR tablet 2 5 mg  2 5 mg Oral Q4H PRN    prasugrel (EFFIENT) tablet 10 mg  10 mg Oral Daily    [START ON 1/30/2021] psyllium (METAMUCIL) 1 packet  1 packet Oral Daily    ranolazine (RANEXA) 12 hr tablet 1,000 mg  1,000 mg Oral Q12H [facility]    rivaroxaban (XARELTO) tablet 15 mg  15 mg Oral Daily With Dinner    saccharomyces boulardii (FLORASTOR) capsule 250 mg  250 mg Oral BID       Prior to Admission medications    Medication Sig Start Date End Date Taking? Authorizing Provider   amLODIPine (NORVASC) 10 mg tablet Take 1 tablet (10 mg total) by mouth daily 12/24/20   Betina Villanueva PA-C   apixaban (ELIQUIS) 5 mg Take 1 tablet (5 mg total) by mouth 2 (two) times a day 1/18/21   Anjelica Lozano PA-C   aspirin (ECOTRIN LOW STRENGTH) 81 mg EC tablet Take 81 mg by mouth daily  Historical Provider, MD   atorvastatin (LIPITOR) 20 mg tablet Take 1 tablet (20 mg total) by mouth daily with dinner 12/5/20   Maria Isabel Turcios, DO   docusate sodium (Colace) 100 mg capsule Take 1 capsule (100 mg total) by mouth 2 (two) times a day 12/5/20   Maria Isabel Turcios, DO   furosemide (LASIX) 40 mg tablet Take 1 tablet (40 mg total) by mouth daily 12/6/20   Maria Isabel Turcios, DO   isosorbide mononitrate (IMDUR) 30 mg 24 hr tablet Take 3 tablets (90 mg total) by mouth daily 1/7/21   Kat Kidd MD   metoprolol succinate (TOPROL-XL) 50 mg 24 hr tablet Take 1 tablet (50 mg total) by mouth daily 1/7/21   Kat Kidd MD   multivitamin [facility]) TABS Take 1 tablet by mouth daily      Historical Provider, MD   nitroglycerin (NITROSTAT) 0 4 mg SL tablet PLACE 1 TABLET (0 4 MG TOTAL) UNDER THE TONGUE EVERY 5 (FIVE) MINUTES AS NEEDED FOR CHEST PAIN 1/6/21   Gus Shields MD   pantoprazole (PROTONIX) 40 mg tablet Take 1 tablet (40 mg total) by mouth daily in the early morning 12/24/20   Maurice Roa PA-C   polyethylene glycol (MIRALAX) 17 g packet Take 17 g by mouth daily 12/6/20   Lue Hazard, DO   prasugrel (EFFIENT) tablet TAKE 1 TABLET EVERY OTHER DAY 12/27/20   Gus Shields MD   ranolazine (RANEXA) 1000 MG SR tablet Take 1,000 mg by mouth 2 (two) times a day    Historical Provider, MD   rivaroxaban (XARELTO) 20 mg tablet Take 1 tablet (20 mg total) by mouth daily with breakfast 1/18/21   Anjelica Lozano PA-C   senna (SENOKOT) 8 6 mg Take 1 tablet (8 6 mg total) by mouth daily at bedtime 12/5/20   Lue Hazard, DO         CODE STATUS & ADVANCED DIRECTIVES     Code Status: Level 1 - Full Code  Advance Directive and Living Will:      Power of :    POLST:        VTE Pharmacologic Prophylaxis: Rivaroxaban (Xarelto)  VTE Mechanical Prophylaxis: sequential compression device    ==  MD Adele Bledsoe's Neurology Residency, PGY-2

## 2021-01-29 NOTE — H&P
H&P- Ana Schneider 1940, 79 y o  female MRN: [id number]    Unit/Bed#: [facility] 903-01 Encounter: [id number]    Primary Care Provider: Erma Kim MD   Date and time admitted to hospital: 1/29/2021  3:37 PM        * Seizure-like activity [facility])  Assessment & Plan  · Patient seen by 10 Steve Wade in [facility] when rapid response called due to patient with eyes rolled up into head, tremor like movements in all 4 extremities, and yelling in pain when attempting to elicit a response  · Stroke alert subsequently called given patients altered mental status, patient's movements and right gaze preference concerning for seizures  · CT head: No acute intracranial abnormality  Microangiopathic changes  · Neurology consulted  · Follow up on results from routine EEG    A-fib [facility])  Assessment & Plan  · Recently diagnosed with a new onset AFib  · Continue Xarelto and Metoprolol    Dysphagia  Assessment & Plan  · Patient was temporarily put on clear liquid diet by GI due to N/V which has resolved and therefore d/w GI and they have cleared patient to resume prior diet    · Back to level 3 diet  · Continue speech therapy  · Keep patient NPO for now, speech eval    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[facility])  Assessment & Plan  · Originally presented with chest pain and elevated troponin and subsequently underwent cardiac cath with stent placement  · Went on to develop afib and tachy-jillian syndrome  · Pacemaker placed on 1/19/2021  · Continue Cozaar and Toprol    Hyperlipidemia  Assessment & Plan  · Continue statin    Urinary retention  Assessment & Plan  Continue with Saeed catheter for now    Anemia  Assessment & Plan  · History of ischemic colitis, denied blood in stool  · CT abd/ pelvis 1/26/21: No signs of intra-abdominal or intrapelvic hemorrhage  No acute inflammatory changes within the abdomen or pelvis     · 1 heme+ stool out of 3 samples  · 1/26/21: Received 1 unit Leukoreduced RBCs   · Per GI, cleared to resume Xarelto and effient 1/28/21  · Hb 1/29/21: 10 1  · Continue to monitor    Essential hypertension  Assessment & Plan  · 1/29/21: BP acceptable  · Continue Cozaar and Toprol  · Continue to monitor      VTE Prophylaxis: Rivaroxaban (Xarelto)  / sequential compression device   Code Status: full  POLST: POLST form is not discussed and not completed at this time  Discussion with family:  Discussed with patient's     Anticipated Length of Stay:  Patient will be admitted on an Inpatient basis with an anticipated length of stay of  Greater than 2 midnights  Justification for Hospital Stay: new onset altered mental status and seizure like activity    Total Time for Visit, including Counseling / Coordination of Care: 1 hour  Greater than 50% of this total time spent on direct patient counseling and coordination of care  Chief Complaint:   Seizure like activity    History of Present Illness:    León Ramos is a 79 y o  female with a PMH of hypertension, CAD status post 4 stents, heart failure with preserved ejection fraction, hyperlipidemia who presents with new onset seizure like activity  Patient was being seen in the [facility] when a rapid response and stroke alert were called because she developed tremor like movements in all 4 extremities, eyes rolled up into her head, and right gaze preference  Did not have facial droop, focal weakness, and did not respond to questions  Patient responded with "ouch" or "no" to noxious stimuli  CT head performed demonstrating no acute abnormality  CTA head and neck unable to be acquired due to poor IV access given significant peripheral vascular disease       Review of Systems:    Review of Systems   Unable to perform ROS: Mental status change       Past Medical and Surgical History:     Past Medical History:   Diagnosis Date    Anal fissure     Cardiac disorder     Cognitive changes 12/23/2020    Esophageal reflux     Esophagitis, reflux     Hemorrhoids     Hepatic hemangioma     Last Assessed: 1/13/2015  Herpes zoster     History of colonic polyps     Hypertension     Ischemic colitis ([facility]     Lumbar herniated disc     [facility]     Nephrolithiasis     L  Lithotripsy    Nontoxic single thyroid nodule     Last Assessed: 1/13/2015    Osteoarthritis     Overactive bladder     Raynaud disease     Respiratory system disease     Sjogren's disease ([facility]     Spinal stenosis     PONCHO (stress urinary incontinence, female)     Uterovaginal prolapse     Grade I-II       Past Surgical History:   Procedure Laterality Date    APPENDECTOMY  1947    CARDIAC SURGERY      CABG    CATARACT EXTRACTION Bilateral     COLONOSCOPY  2012    Fiberoptic    COLONOSCOPY      Resolved: 2006 - 2012 5 year f/u    CORONARY ANGIOPLASTY WITH STENT PLACEMENT      CORONARY ARTERY BYPASS GRAFT      Resolved: 2012    ESOPHAGOGASTRODUODENOSCOPY  2012    Diagnostic    HEMORROIDECTOMY      KNEE SURGERY      LITHOTRIPSY      Renal    MALIGNANT SKIN LESION EXCISION      Face; Resolved: 2004    TX ESOPHAGOGASTRODUODENOSCOPY TRANSORAL DIAGNOSTIC N/A 4/13/2016    Procedure: EGD AND COLONOSCOPY;  Surgeon: Jena Jackson MD;  [facility]; Service: Gastroenterology    RENAL ARTERY STENT      SKIN LESION EXCISION      Scalp    SOFT TISSUE TUMOR RESECTION      Shoulder; Resolved: 1995    THROMBOLYSIS      Postoperative Thrombolysis PTCA    TONSILLECTOMY      Resolved: 1944       Meds/Allergies:    Prior to Admission medications    Medication Sig Start Date End Date Taking? Authorizing Provider   amLODIPine (NORVASC) 10 mg tablet Take 1 tablet (10 mg total) by mouth daily 12/24/20   Gray Taveras PA-C   apixaban (ELIQUIS) 5 mg Take 1 tablet (5 mg total) by mouth 2 (two) times a day 1/18/21   Anjelica Lozano PA-C   aspirin (ECOTRIN LOW STRENGTH) 81 mg EC tablet Take 81 mg by mouth daily      Historical Provider, MD   atorvastatin (LIPITOR) 20 mg tablet Take 1 tablet (20 mg total) by mouth daily with dinner 12/5/20   Celestina Hendrix DO   docusate sodium (Colace) 100 mg capsule Take 1 capsule (100 mg total) by mouth 2 (two) times a day 12/5/20   Celestina Hendrix DO   furosemide (LASIX) 40 mg tablet Take 1 tablet (40 mg total) by mouth daily 12/6/20   Celestina Hendrix DO   isosorbide mononitrate (IMDUR) 30 mg 24 hr tablet Take 3 tablets (90 mg total) by mouth daily 1/7/21   Prasanth Goncalves MD   metoprolol succinate (TOPROL-XL) 50 mg 24 hr tablet Take 1 tablet (50 mg total) by mouth daily 1/7/21   Prasanth Goncalves MD   multivitamin [facility]) TABS Take 1 tablet by mouth daily  Historical Provider, MD   nitroglycerin (NITROSTAT) 0 4 mg SL tablet PLACE 1 TABLET (0 4 MG TOTAL) UNDER THE TONGUE EVERY 5 (FIVE) MINUTES AS NEEDED FOR CHEST PAIN 1/6/21   Prasanth Goncalves MD   pantoprazole (PROTONIX) 40 mg tablet Take 1 tablet (40 mg total) by mouth daily in the early morning 12/24/20   Nahed Guzmán PA-C   polyethylene glycol (MIRALAX) 17 g packet Take 17 g by mouth daily 12/6/20   Celestina Hendrix DO   prasugrel (EFFIENT) tablet TAKE 1 TABLET EVERY OTHER DAY 12/27/20   Prasanth Goncalves MD   ranolazine (RANEXA) 1000 MG SR tablet Take 1,000 mg by mouth 2 (two) times a day    Historical Provider, MD   rivaroxaban (XARELTO) 20 mg tablet Take 1 tablet (20 mg total) by mouth daily with breakfast 1/18/21   Anjelica Lozano PA-C   senna (SENOKOT) 8 6 mg Take 1 tablet (8 6 mg total) by mouth daily at bedtime 12/5/20   Celestina Hendrix DO     I have reviewed home medications with patient personally  Allergies:    Allergies   Allergen Reactions    Sulfa Antibiotics Anaphylaxis    Formaldehyde     Codeine Palpitations       Social History:     Marital Status: /Civil Union     Substance Use History:   Social History     Substance and Sexual Activity   Alcohol Use Not Currently    Frequency: Monthly or less    Comment: social     Social History     Tobacco Use   Smoking Status Never Smoker   Smokeless Tobacco Never Used     Social History     Substance and Sexual Activity   Drug Use No       Family History:    Family History   Problem Relation Age of Onset    Heart disease Mother     Hypertension Mother     Osteoporosis Mother     Heart disease Father     Hypertension Father     Ulcerative colitis Family     Colon polyps Family        Physical Exam:     Vitals:   Blood Pressure: 108/88 (01/29/21 1549)  Pulse: 70 (01/29/21 1549)  Temperature: 98 6 °F (37 °C) (01/29/21 1549)  Respirations: 16 (01/29/21 1549)  SpO2: 95 % (01/29/21 1549)    Physical Exam  Vitals signs reviewed  Constitutional:       Appearance: She is ill-appearing  She is not diaphoretic  Comments: Obtunded  Eyes open   HENT:      Head: Normocephalic and atraumatic  Nose: No rhinorrhea  Mouth/Throat:      Mouth: Mucous membranes are moist    Eyes:      General: No scleral icterus  Cardiovascular:      Rate and Rhythm: Normal rate and regular rhythm  Pulses: Normal pulses  Heart sounds: Normal heart sounds  No murmur  Pulmonary:      Effort: Pulmonary effort is normal  No respiratory distress  Breath sounds: Normal breath sounds  Abdominal:      General: Abdomen is flat  Bowel sounds are normal  There is no distension  Palpations: Abdomen is soft  Genitourinary:     Comments: Saeed catheter in place  Musculoskeletal:      Right lower leg: Edema present  Left lower leg: Edema present  Comments: Bilateral edema 2/4   Skin:     General: Skin is warm and dry  Neurological:      Mental Status: She is disoriented  Comments: Non-verbal, not responding to commands or questions, right sided gaze           Additional Data:     Lab Results: I have personally reviewed pertinent reports        Results from last 7 days   Lab Units 01/29/21  1156  01/29/21  0547   WBC Thousand/uL 5 19  --  4 92   HEMOGLOBIN g/dL 10 1*  --  10 2*   I STAT HEMOGLOBIN   --    < >  --    HEMATOCRIT % 31 3*  --  32 1* HEMATOCRIT, ISTAT   --    < >  --    PLATELETS Thousands/uL 251  --  260   NEUTROS PCT %  --   --  49   LYMPHS PCT %  --   --  34   MONOS PCT %  --   --  14*   EOS PCT %  --   --  1    < > = values in this interval not displayed  Results from last 7 days   Lab Units 01/29/21  1156  01/26/21  0551   SODIUM mmol/L 133*   < > 130*   POTASSIUM mmol/L 4 1   < > 3 8   CHLORIDE mmol/L 101   < > 97*   CO2 mmol/L 29   < > 28   CO2, I-STAT   --    < >  --    BUN mg/dL 8   < > 11   CREATININE mg/dL 0 83   < > 0 82   ANION GAP mmol/L 3*   < > 5   CALCIUM mg/dL 9 3   < > 8 3   ALBUMIN g/dL  --   --  2 2*   TOTAL BILIRUBIN mg/dL  --   --  1 12*   ALK PHOS U/L  --   --  58   ALT U/L  --   --  23   AST U/L  --   --  21   GLUCOSE RANDOM mg/dL 99   < > 87    < > = values in this interval not displayed  Results from last 7 days   Lab Units 01/29/21  1156   INR  2 89*     Results from last 7 days   Lab Units 01/29/21  1132   POC GLUCOSE mg/dl 106         Results from last 7 days   Lab Units 01/26/21  1048   LACTIC ACID mmol/L 1 2       Imaging: I have personally reviewed pertinent reports  No orders to display       EKG, Pathology, and Other Studies Reviewed on Admission:   · yes    Allscripts / Epic Records Reviewed: Yes     ** Please Note: This note has been constructed using a voice recognition system   **

## 2021-01-29 NOTE — ASSESSMENT & PLAN NOTE
Likely related to a combination seizure like activity combined with postictal state  Monitor mental status

## 2021-01-29 NOTE — PROGRESS NOTES
01/29/21 0930   Therapy Time missed   Time missed?  Yes   Amount of time missed 60   Reason for time missed Illness  (Verbal Med hold per Fredo Lazaro)

## 2021-01-29 NOTE — ASSESSMENT & PLAN NOTE
- neurology consulted and recommended stat [facility] and EEG which are PENDING   - unable to have CTA yet due to pending IV access   - additionally had stent put in on 1/11 and pacer on 1/19 therefore may not yet be able to get MRI however will defer to radiology dept should neurology recommend MRI

## 2021-01-29 NOTE — QUICK NOTE
Alerted by staff that pt "didn't look good"  When I entered room, pt had eyes rolled up in head and had tremor like movements of all 4 extremities  When trying to illicit a response from her, she would yell as if in pain and if touched anywhere on her body  Arms stiff  SBP manually left arm was 120,    RR called immediately

## 2021-01-29 NOTE — ASSESSMENT & PLAN NOTE
· Patient seen by Fidel Wade in [facility] when rapid response called due to patient with eyes rolled up into head, tremor like movements in all 4 extremities, and yelling in pain when attempting to elicit a response  · Stroke alert subsequently called given patients altered mental status, patient's movements and right gaze preference concerning for seizures  · CT head: No acute intracranial abnormality    Microangiopathic changes  · Neurology consulted  · Follow up on results from routine EEG

## 2021-01-29 NOTE — PROGRESS NOTES
Physical Medicine and Rehabilitation Progress Note  Sharan Chatman 79 y o  female MRN: [id number]  Unit/Bed#: -52 Encounter: [id number]    HPI: Sharan Chatman is a 79 y o  female who presented to the [address] with chest pain and elevated troponins and underwent cardiac cath and stent placement however later developed new onset a-fib and tachy-jillian syndrome requiring pacer placement on 1/19  Course complicated by PNA which was treated with abx and colitis  Chief Complaint: cardiac debility     Subjective: patient seen at bedside and denies N/V     ROS: A 10 point ROS was performed; negative except as noted above       Assessment/Plan:      Seizure-like activity [facility])  Assessment & Plan  - neurology consulted and recommended stat CTH and EEG which are PENDING   - unable to have CTA yet due to pending IV access   - additionally had stent put in on 1/11 and pacer on 1/19 therefore may not yet be able to get MRI however will defer to radiology dept should neurology recommend MRI      A-fib [facility])  Assessment & Plan  - new onset in acute care   - at home on toprol XL (24 hr) 25 mg qd however was increased to 50 mg after prior hospitalization (IM managing BB)   - started on xarelto 15 mg qdinner in acute care per EP recommendations (CrCl was borderline or below cutoff for 20 mg qdinner dose in acute care therefore EP elected for lower 15 mg dose and while CrCl has been higher at times here in rehab unit will continue to use lower 15 mg dose as CrCl tends to fluctuate above and below cut off and over the last year appears to below cutoff more often than not) -->was on hold due to 1 heme + stool out of 3 samples per GI recommendations; d/w GI and they cleared patient to resume xarelto 15 mg qdinner on 1/28 (which has been done)   - OP FU with Dr Henrietta King and Vania Arteaga (scheduled for 2/18)     CAD (coronary artery disease)  Assessment & Plan  - h/o CABG  - elevated troponins in acute care and patient is s/p LAMINE on 1/11/21  - at home was on zocor 40 mg qpm however was switched to lipitor 20 mg qpm after prior hospitalization   - home asa stopped in acute care per EP recommendations    - continued on home effient 10 mg qd per EP recommendations--> 1 heme + stool out of 3 samples, d/w GI and they have cleared patient to continue effient at this time   - home ranexa increased from 500 mg q12 to 1000 mg q12 after prior hospitalization    - at home on toprol XL (24 hr) 25 mg qd however increased to 50 mg after prior hospitalization (IM managing BB dose while in rehab unit)   - OP FU with Dr Papito Blunt and Shannan Laurent (scheduled for 2/18)           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[facility] [address] )  Assessment & Plan  - grade 1 DD  - EF 45%  - at home on torsemide 20 mg qd however per patient she takes a 1/2 tab daily however ultimately it appears this was switched to lasix 40 mg qd after prior hospitalization  - chest XR of 1/20 and CT of 1/26 revealed small B/L pleural effusions in acute care (however patient also had PNA in acute care and is s/p abx)  - daily weights, I/O   - IM managing while in rehab unit   - OP FU with Dr Papito Blunt and Shannan Laurent (which is scheduled for 2/18)             * 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[facility])  Assessment & Plan  - s/p pacer on 1/19  - FU for device and incision check scheduled for 2/1  - OP FU with Dr Papito Blunt and Arturo Farmer (scheduled for 2/18)     Urinary retention  Assessment & Plan  - UA completed however does not appear to indicate infxn, UCx finalized and grew 60-69 K cfu/ml of candida and less then 10 K cfu/ml VRE-->d/w IM and they do not recommend treatment given UA and low cfu/ml counts for both the candida (which IM suspects is a contaminant) and the VRE  - TOV in process     Edema of left upper extremity  Assessment & Plan  - in the setting of recent pacer placement   - LUE negative for DVT, may be 2/2 to dependent edema in the setting or recent PPM placement      CKD (chronic kidney disease)  Assessment & Plan  - Cr currently 0 83  - baseline Cr appears to be 0 8-1 0  - IM monitoring     Anemia  Assessment & Plan  - Hg currently trending up to 10 1 s/p transfusion   - 1 heme+ stool out of 3 samples  - CT abd/pelvis ordered per IM recommendation to r/o retroperitoneal bleed however this did not show any bleed   - per GI recommendations was holding xarelto but continued effient but on 1/28 GI cleared to resume both which has been done   - IM and GI monitoring (no plan for endoscopic evaluation while inpt at this time per GI)       Hyponatremia  Assessment & Plan  - currently 133  - IM managing     Abnormal liver enzymes  Assessment & Plan  - AST now WNL   - mildly elevated total bilirubin of 1 12 (ULN 1 00) with Alk phos WNL   - GI following     Dysphagia  Assessment & Plan  - of note patient was temporarily put on clear liquid diet by GI due to N/V which has resolved and therefore d/w GI and they have cleared patient to resume prior diet    - mild at level 3 diet  - continue speech therapy      Diarrhea  Assessment & Plan  - h/o IBS  - h/o ischemic colitis in 2019  - C diff negative on 1/18/21  - CT done in acute care suggestive of infectious colitis however this finding is no longer present on present on repeat CT A/P of 1/26  - GI consulted and feel this may be overflow diarrhea 2/2 to stool present in the bowel and GI has placed patient on bowel regimen and at this time have no plans for inpt endoscopic evaluation and recommend OP colonoscopy given h/o of ischemic colits in 2019        PAD (peripheral artery disease) ([facility])  Assessment & Plan  - diffuse PAD with B/L LE occlusive disease  - chronic occlusion of Rt subclavian artery  - stenosis of L subclavian artery, L carotid artery, celiac artery, B/L renal arteries, SMA, JETHRO, L ALBERTO  - home asa stopped in acute care per EP recommendations  - home effient 10 mg qd continued per EP recommendations   - was on zocor 40 mg qpm previously however switched to lipitor 20 mg qpm after last hospitalization   - OP FU with Dr Aris Schofield hypertension  Assessment & Plan  - at home on regimen of norvsasc 5 mg qd however recently increased to 10 mg after prior hospitalization, imdur (24 hr) 60 mg qd however increased to 90 mg after prior hospitalization, toprol XL (24 hr) 25 mg qd however increased to 50 mg after prior hospitalization, and cozaar 50 mg qd (patient also appears to have been on torsemide 20 mg qd however patient states she takes a 1/2 tab qd and ultimately this appears to have been switched to lasix 40 mg after prior hospitalization)   - IM managing      Scheduled Meds:  Current Facility-Administered Medications   Medication Dose Route Frequency Provider Last Rate    acetaminophen  650 mg Oral Q6H PRN Nadia Trinh MD      atorvastatin  20 mg Oral Daily With Yunior Nuno MD      bisacodyl  10 mg Rectal Daily PRN Chery Grijalva DO      lidocaine  1 application Urethral B3G PRN Nadia Trinh MD      losartan  25 mg Oral Daily Viral Chandra MD      metoprolol succinate  50 mg Oral Daily Viral Chandra MD      oxyCODONE  2 5 mg Oral Q4H PRN Viral Chandra MD      prasugrel  10 mg Oral Daily KATHY Rivers      psyllium  1 packet Oral Daily Guilherme Katia Rodas MD      ranolazine  1,000 mg Oral Q12H Viral Chandra MD      rivaroxaban  15 mg Oral Daily With Yunior Nuno MD      saccharomyces boulardii  250 mg Oral BID Nadia Trinh MD            Incidental findings:     1) abnormalities on echocardiogram while in acute care including but not limited to elevated peak PA pressure: OP FU with Dr Tamar Reeder and Marcy CHAVEZ     2) abnormalities on EKG while in acute care: in the setting of paced rhythm and patient was evaluated by cards with no further inpt PAULSON/intervention recommended; OP FU with Dr Tamar Reeder and Marcy CHAVEZ     3) small hiatal hernia: asymptomatic; OP FU with PCP with further testing/treatment and/or specialist referral at PCP's discretion      4) gallstones w/distended gallbladder (no pericholecystatic inflammatory findings per imaging report): asymptomatic; OP FU with PCP with further testing/treatment and/or specialist referral at PCP's discretion      5) non-obstructive 1 mm L kidney stone: asymptomatic; not present on CT A/P of 1/26    6) low lipase level: mildly decreased at 58 (LLN 73) on 1/26 and was 50 on 1/12, evaluated by GI and no further PAULSON/intervention for low lipsie was recommended however CT abd/pelvis was ordered per IM recommendation due to drop in Hg and r/o retroperitoneal bleed which was unrevealing     DVT ppx: xarelto          Objective:    Functional Update:  Mobility: min-mod  Transfers: max  ADLs: total x 2       Physical Exam:            General: lethargic and not responding to questions or commands  HEENT:  Head: Normal, normocephalic, atraumatic  Eye: Normal external eye, conjunctiva, lidsc cornea  Ears: Normal external ears  Nose: Normal external nose, mucus membranes  CARDIAC:  +S1/2  LUNGS:  no abnormal respiratory pattern, no retractions noted, non-labored breathing   ABDOMEN:  soft NT  EXTREMITIES:  no cyanosis  NEURO:  lethargic, not responding to commands or questions, shaking observed in all 4 limbs, withdrawl to noxious stimuli  PSYCH:  patient lethargic and not responding to questions or commands       Diagnostic Studies:  CT abdomen pelvis wo contrast   Final Result by Supriya Wolfe MD (01/26 7793)      No signs of intra-abdominal or intrapelvic hemorrhage  No acute inflammatory changes within the abdomen or pelvis                    Workstation performed: [id number]         VAS upper limb venous duplex scan, unilateral/limited   Final Result by Patricia Leslie MD (01/22 3777)      CT stroke alert brain    (Results Pending)       Laboratory:   Results from last 7 days   Lab Units 01/29/21  0405 01/29/21  1146 01/29/21  0547 01/27/21  0545   HEMOGLOBIN g/dL 10 1*  --  10 2* 9 2*   I STAT HEMOGLOBIN g/dl  --  9 5*  --   --    HEMATOCRIT % 31 3*  --  32 1* 28 0*   HEMATOCRIT, ISTAT %  --  28*  --   --    WBC Thousand/uL 5 19  --  4 92 4 44     Results from last 7 days   Lab Units 01/29/21  1156 01/29/21  1146 01/29/21  0547 01/27/21  0545 01/26/21  1028 01/26/21  0551   BUN mg/dL 8  --  6 6  --  11   SODIUM mmol/L 133*  --  135* 135*  --  130*   POTASSIUM mmol/L 4 1  --  3 5 3 7  --  3 8   CHLORIDE mmol/L 101  --  102 102  --  97*   GLUCOSE, ISTAT mg/dl  --  105  --   --  110  --    CREATININE mg/dL 0 83  --  0 80 0 58*  --  0 82   AST U/L  --   --   --   --   --  21   ALT U/L  --   --   --   --   --  23

## 2021-01-29 NOTE — ASSESSMENT & PLAN NOTE
· History of ischemic colitis, denied blood in stool  · CT abd/ pelvis 1/26/21: No signs of intra-abdominal or intrapelvic hemorrhage  No acute inflammatory changes within the abdomen or pelvis     · 1 heme+ stool out of 3 samples  · 1/26/21: Received 1 unit Leukoreduced RBCs   · Per GI, cleared to resume Xarelto and effient 1/28/21  · Hb 1/29/21: 10 1  · Continue to monitor

## 2021-01-29 NOTE — PLAN OF CARE
Problem: Prexisting or High Potential for Compromised Skin Integrity  Goal: Skin integrity is maintained or improved  Description: INTERVENTIONS:  - Identify patients at risk for skin breakdown  - Assess and monitor skin integrity  - Assess and monitor nutrition and hydration status  - Monitor labs   - Assess for incontinence   - Turn and reposition patient  - Assist with mobility/ambulation  - Relieve pressure over bony prominences  - Avoid friction and shearing  - Provide appropriate hygiene as needed including keeping skin clean and dry  - Evaluate need for skin moisturizer/barrier cream  - Collaborate with interdisciplinary team   - Patient/family teaching  - Consider wound care consult   Outcome: Progressing     Problem: Neurological Deficit  Goal: Neurological status is stable or improving  Description: Interventions:  - Monitor and assess patient's level of consciousness, motor function, sensory function, and level of assistance needed for ADLs  - Monitor and report changes from baseline  Collaborate with interdisciplinary team to initiate plan and implement interventions as ordered  - Provide and maintain a safe environment  - Consider seizure precautions  - Consider fall precautions  - Consider aspiration precautions  - Consider bleeding precautions  Outcome: Progressing     Problem: Activity Intolerance/Impaired Mobility  Goal: Mobility/activity is maintained at optimum level for patient  Description: Interventions:  - Assess and monitor patient  barriers to mobility and need for assistive/adaptive devices  - Assess patient's emotional response to limitations  - Collaborate with interdisciplinary team and initiate plans and interventions as ordered  - Encourage independent activity per ability   - Maintain proper body alignment  - Perform active/passive rom as tolerated/ordered    - Plan activities to conserve energy   - Turn patient as appropriate  Outcome: Progressing     Problem: Communication Impairment  Goal: Ability to express needs and understand communication  Description: Assess patient's communication skills and ability to understand information  Patient will demonstrate use of effective communication techniques, alternative methods of communication and understanding even if not able to speak  - Encourage communication and provide alternate methods of communication as needed  - Collaborate with case management/ for discharge needs  - Include patient/family/caregiver in decisions related to communication  Outcome: Progressing     Problem: Potential for Aspiration  Goal: Non-ventilated patient's risk of aspiration is minimized  Description: Assess and monitor vital signs, respiratory status, and labs (WBC)  Monitor for signs of aspiration (tachypnea, cough, rales, wheezing, cyanosis, fever)  - Assess and monitor patient's ability to swallow  - Place patient up in chair to eat if possible  - HOB up at 90 degrees to eat if unable to get patient up into chair   - Supervise patient during oral intake  - Instruct patient/ family to take small bites  - Instruct patient/ family to take small single sips when taking liquids  - Follow patient-specific strategies generated by speech pathologist   Outcome: Progressing     Problem: Nutrition  Goal: Nutrition/Hydration status is improving  Description: Monitor and assess patient's nutrition/hydration status for malnutrition (ex- brittle hair, bruises, dry skin, pale skin and conjunctiva, muscle wasting, smooth red tongue, and disorientation)  Collaborate with interdisciplinary team and initiate plan and interventions as ordered  Monitor patient's weight and dietary intake as ordered or per policy  Utilize nutrition screening tool and intervene per policy  Determine patient's food preferences and provide high-protein, high-caloric foods as appropriate       - Assist patient with eating   - Allow adequate time for meals   - Encourage patient to take dietary supplement as ordered  - Collaborate with clinical nutritionist   - Include patient/family/caregiver in decisions related to nutrition  Outcome: Progressing     Problem: PAIN - ADULT  Goal: Verbalizes/displays adequate comfort level or baseline comfort level  Description: Interventions:  - Encourage patient to monitor pain and request assistance  - Assess pain using appropriate pain scale  - Administer analgesics based on type and severity of pain and evaluate response  - Implement non-pharmacological measures as appropriate and evaluate response  - Consider cultural and social influences on pain and pain management  - Notify physician/advanced practitioner if interventions unsuccessful or patient reports new pain  Outcome: Progressing     Problem: INFECTION - ADULT  Goal: Absence or prevention of progression during hospitalization  Description: INTERVENTIONS:  - Assess and monitor for signs and symptoms of infection  - Monitor lab/diagnostic results  - Monitor all insertion sites, i e  indwelling lines, tubes, and drains  - Monitor endotracheal if appropriate and nasal secretions for changes in amount and color  - [place] appropriate cooling/warming therapies per order  - Administer medications as ordered  - Instruct and encourage patient and family to use good hand hygiene technique  - Identify and instruct in appropriate isolation precautions for identified infection/condition  Outcome: Progressing  Goal: Absence of fever/infection during neutropenic period  Description: INTERVENTIONS:  - Monitor WBC    Outcome: Progressing     Problem: SAFETY ADULT  Goal: Patient will remain free of falls  Description: INTERVENTIONS:  - Assess patient frequently for physical needs  -  Identify cognitive and physical deficits and behaviors that affect risk of falls    -  [place] fall precautions as indicated by assessment   - Educate patient/family on patient safety including physical limitations  - Instruct patient to call for assistance with activity based on assessment  - Modify environment to reduce risk of injury  - Consider OT/PT consult to assist with strengthening/mobility  Outcome: Progressing  Goal: Maintain or return to baseline ADL function  Description: INTERVENTIONS:  -  Assess patient's ability to carry out ADLs; assess patient's baseline for ADL function and identify physical deficits which impact ability to perform ADLs (bathing, care of mouth/teeth, toileting, grooming, dressing, etc )  - Assess/evaluate cause of self-care deficits   - Assess range of motion  - Assess patient's mobility; develop plan if impaired  - Assess patient's need for assistive devices and provide as appropriate  - Encourage maximum independence but intervene and supervise when necessary  - Involve family in performance of ADLs  - Assess for home care needs following discharge   - Consider OT consult to assist with ADL evaluation and planning for discharge  - Provide patient education as appropriate  Outcome: Progressing  Goal: Maintain or return mobility status to optimal level  Description: INTERVENTIONS:  - Assess patient's baseline mobility status (ambulation, transfers, stairs, etc )    - Identify cognitive and physical deficits and behaviors that affect mobility  - Identify mobility aids required to assist with transfers and/or ambulation (gait belt, sit-to-stand, lift, walker, cane, etc )  - [place] fall precautions as indicated by assessment  - Record patient progress and toleration of activity level on Mobility SBAR; progress patient to next Phase/Stage  - Instruct patient to call for assistance with activity based on assessment  - Consider rehabilitation consult to assist with strengthening/weightbearing, etc   Outcome: Progressing     Problem: DISCHARGE PLANNING  Goal: Discharge to home or other facility with appropriate resources  Description: INTERVENTIONS:  - Identify barriers to discharge w/patient and caregiver  - Arrange for needed discharge resources and transportation as appropriate  - Identify discharge learning needs (meds, wound care, etc )  - Arrange for interpretive services to assist at discharge as needed  - Refer to Case Management Department for coordinating discharge planning if the patient needs post-hospital services based on physician/advanced practitioner order or complex needs related to functional status, cognitive ability, or social support system  Outcome: Progressing     Problem: Knowledge Deficit  Goal: Patient/family/caregiver demonstrates understanding of disease process, treatment plan, medications, and discharge instructions  Description: Complete learning assessment and assess knowledge base    Interventions:  - Provide teaching at level of understanding  - Provide teaching via preferred learning methods  Outcome: Progressing

## 2021-01-29 NOTE — PHYSICAL THERAPY NOTE
[facility] PT Discharge Note  Pt demonstrated a change in medical status this morning and was transferred back to acute inpatient for further monitoring and treatment  Recommend skilled PT intervention once medically stable to participate

## 2021-01-29 NOTE — ASSESSMENT & PLAN NOTE
· Originally presented with chest pain and elevated troponin and subsequently underwent cardiac cath with stent placement  · Went on to develop afib and tachy-jillian syndrome  · Pacemaker placed on 1/19/2021  · Continue Cozaar and Toprol

## 2021-01-29 NOTE — ASSESSMENT & PLAN NOTE
· Patient was a stroke alert this morning for altered mental status that began at 9:30 a m  Following Reglan  · Patient was awake but not responding to questions  · During examination patient with right-sided gaze and rhythmic twitching suspicious for seizure  · Stroke alert CT negative  · Unable to perform MRI 2/2 recent LAMINE and PPM placement  · EEG completed and read pend  · This afternoon DI alert triggered for mental status change  On my exam patient was again right-sided gaze with rhythmic twitching which lasted approximately 10 minutes  Terminated with Ativan  Unfortunately after Ativan administration patient became obtunded with sonorous respirations   · Patient will be brought to the ICU for airway monitoring and potential intubation  · Keppra 500 b i d    · Airway monitoring  · Low threshold to initiate EMU  · Appreciate neurology input

## 2021-01-29 NOTE — ASSESSMENT & PLAN NOTE
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, recent PE, recent pneumonia treated with antibiotics she was  was a stroke alert on 1/29/2021 for AMS was noted to have a right gaze pref  Patient was found to be obtunded after receiving ativan and keppra and was upgraded to icu  · Impression- Patient has had 2 episodes of AMS with generalized shaking, right gaze preference,  unresponsiveness  Unclear cause of her seizure-like episodes  · Plan    · 2/1/2021: Routine EEG: This is an abnormal 29 minutes awake and drowsy EEG due to intermittent diffuse delta activity and left temporal delta activity   These findings may indicate mild diffuse cerebral dysfunction with superimposed left temporal focal cerebral dysfunction    · Recommend Continuing Keppra 500 mg b i d   · Once able to get MRI brain, would recommend obtaining Mri with and without, okay to obtain as outpatient  · Continue seizure precautions  · Page Neurology for any abnormal/seziure like movements  · No further inpatient recommendations, please call/text for questions   · Patient currently drives, will send out pen dot form and patient counseled on need to stop driving  · Will schedule outpatient appointment with Neurology

## 2021-01-29 NOTE — DISCHARGE SUMMARY
Physical Medicine and Rehabilitation Progress Note  Toni Finn 79 y o  female MRN: [id number]  Unit/Bed#: -81 Encounter: [id number]    Discharge Summary - PMR           Admission Date:    1/21/21  Discharge Date:   1/29/21    Diagnosis:   Cardiac debility     Functional Status Upon Discharge:   Mobility: minimum ot moderate assistance   Transfers: maximum assistance  ADLs: total assist of 2       Disposition: acute care       Comorbidities:   See below for medical details     Hospital Course:       Toni Finn is a 79 y o  female who presented to the [facility] with chest pain and elevated troponins and underwent cardiac cath and stent placement however later developed new onset a-fib and tachy-jillian syndrome requiring pacer placement on 1/19  Course complicated by PNA which was treated with abx and colitis  Patient was transferred to acute rehab on 1/21/21 however on 1/29/21 patient developed seizure like activity and was transferred to acute care for further management             Seizure-like activity [facility])  Assessment & Plan  - neurology consulted and recommended stat [facility] and EEG which are PENDING   - unable to have CTA yet due to pending IV access   - additionally had stent put in on 1/11 and pacer on 1/19 therefore may not yet be able to get MRI however will defer to radiology dept should neurology recommend MRI      A-fib [facility])  Assessment & Plan  - new onset in acute care   - at home on toprol XL (24 hr) 25 mg qd however was increased to 50 mg after prior hospitalization (IM managing BB)   - started on xarelto 15 mg qdinner in acute care per EP recommendations (CrCl was borderline or below cutoff for 20 mg qdinner dose in acute care therefore EP elected for lower 15 mg dose and while CrCl has been higher at times here in rehab unit will continue to use lower 15 mg dose as CrCl tends to fluctuate above and below cut off and over the last year appears to below cutoff more often than not) -->was on hold due to 1 heme + stool out of 3 samples per GI recommendations; d/w GI and they cleared patient to resume xarelto 15 mg qdinner on 1/28 (which has been done)   - OP FU with Dr Raymon Burdick and Hulan Olszewski (scheduled for 2/18)     CAD (coronary artery disease)  Assessment & Plan  - h/o CABG  - elevated troponins in acute care and patient is s/p LAMINE on 1/11/21  - at home was on zocor 40 mg qpm however was switched to lipitor 20 mg qpm after prior hospitalization   - home asa stopped in acute care per EP recommendations    - continued on home effient 10 mg qd per EP recommendations--> 1 heme + stool out of 3 samples, d/w GI and they have cleared patient to continue effient at this time   - home ranexa increased from 500 mg q12 to 1000 mg q12 after prior hospitalization    - at home on toprol XL (24 hr) 25 mg qd however increased to 50 mg after prior hospitalization (IM managing BB dose while in rehab unit)   - OP FU with Dr Raymon Burdick and Sameer New (scheduled for 2/18)           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[facility] [address] )  Assessment & Plan  - grade 1 DD  - EF 45%  - at home on torsemide 20 mg qd however per patient she takes a 1/2 tab daily however ultimately it appears this was switched to lasix 40 mg qd after prior hospitalization  - chest XR of 1/20 and CT of 1/26 revealed small B/L pleural effusions in acute care (however patient also had PNA in acute care and is s/p abx)  - daily weights, I/O   - IM managing while in rehab unit   - OP FU with Dr Raymon Burdick and Sameer New (which is scheduled for 2/18)             * 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[facility])  Assessment & Plan  - s/p pacer on 1/19  - FU for device and incision check scheduled for 2/1  - OP FU with Dr Raymon Burdick and Lorene Araujo (scheduled for 2/18)     Urinary retention  Assessment & Plan  - UA completed however does not appear to indicate infxn, UCx finalized and grew 60-69 K cfu/ml of candida and less then 10 K cfu/ml VRE-->d/w IM and they do not recommend treatment given UA and low cfu/ml counts for both the candida (which IM suspects is a contaminant) and the VRE  - TOV in process     Edema of left upper extremity  Assessment & Plan  - in the setting of recent pacer placement   - LUE negative for DVT, may be 2/2 to dependent edema in the setting or recent PPM placement      CKD (chronic kidney disease)  Assessment & Plan  - Cr currently 0 83  - baseline Cr appears to be 0 8-1 0  - IM monitoring     Anemia  Assessment & Plan  - Hg currently trending up to 10 1 s/p transfusion   - 1 heme+ stool out of 3 samples  - CT abd/pelvis ordered per IM recommendation to r/o retroperitoneal bleed however this did not show any bleed   - per GI recommendations was holding xarelto but continued effient but on 1/28 GI cleared to resume both which has been done   - IM and GI monitoring (no plan for endoscopic evaluation while inpt at this time per GI)       Hyponatremia  Assessment & Plan  - currently 133  - IM managing     Abnormal liver enzymes  Assessment & Plan  - AST now WNL   - mildly elevated total bilirubin of 1 12 (ULN 1 00) with Alk phos WNL   - GI following     Dysphagia  Assessment & Plan  - of note patient was temporarily put on clear liquid diet by GI due to N/V which has resolved and therefore d/w GI and they have cleared patient to resume prior diet    - mild at level 3 diet  - continue speech therapy      Diarrhea  Assessment & Plan  - h/o IBS  - h/o ischemic colitis in 2019  - C diff negative on 1/18/21  - CT done in acute care suggestive of infectious colitis however this finding is no longer present on present on repeat CT A/P of 1/26  - GI consulted and feel this may be overflow diarrhea 2/2 to stool present in the bowel and GI has placed patient on bowel regimen and at this time have no plans for inpt endoscopic evaluation and recommend OP colonoscopy given h/o of ischemic colits in 2019        PAD (peripheral artery disease) (HCC)  Assessment & Plan  - diffuse PAD with B/L LE occlusive disease  - chronic occlusion of Rt subclavian artery  - stenosis of L subclavian artery, L carotid artery, celiac artery, B/L renal arteries, SMA, JETHRO, L ALBERTO  - home asa stopped in acute care per EP recommendations  - home effient 10 mg qd continued per EP recommendations   - was on zocor 40 mg qpm previously however switched to lipitor 20 mg qpm after last hospitalization   - OP FU with Dr Charisma Lopez hypertension  Assessment & Plan  - at home on regimen of norvsasc 5 mg qd however recently increased to 10 mg after prior hospitalization, imdur (24 hr) 60 mg qd however increased to 90 mg after prior hospitalization, toprol XL (24 hr) 25 mg qd however increased to 50 mg after prior hospitalization, and cozaar 50 mg qd (patient also appears to have been on torsemide 20 mg qd however patient states she takes a 1/2 tab qd and ultimately this appears to have been switched to lasix 40 mg after prior hospitalization)   - IM managing      Scheduled Meds:  Current Facility-Administered Medications   Medication Dose Route Frequency Provider Last Rate    acetaminophen  650 mg Oral Q6H PRN Mendel Easley MD      atorvastatin  20 mg Oral Daily With Bassem Caballero MD      bisacodyl  10 mg Rectal Daily PRN [facility], DO      lidocaine  1 application Urethral Z2V PRN Mendel Easley MD      losartan  25 mg Oral Daily Rehan New MD      metoprolol succinate  50 mg Oral Daily Rehan New MD      oxyCODONE  2 5 mg Oral Q4H PRN Rehan New MD      prasugrel  10 mg Oral Daily AlveKATHY Love      psyllium  1 packet Oral Daily Guilherme Salazar MD      ranolazine  1,000 mg Oral Q12H Rehan New MD      rivaroxaban  15 mg Oral Daily With Bassem Caballero MD      saccharomyces boulardii  250 mg Oral BID eMndel Easley MD            Incidental findings:     1) abnormalities on echocardiogram while in acute care including but not limited to elevated peak PA pressure: OP FU with Dr Darwyn Canavan and Nidia CHAVEZ     2) abnormalities on EKG while in acute care: in the setting of paced rhythm and patient was evaluated by cards with no further inpt PAULSON/intervention recommended; OP FU with Dr Darwyn Canavan and Maribel President     3) small hiatal hernia: asymptomatic; OP FU with PCP with further testing/treatment and/or specialist referral at PCP's discretion      4) gallstones w/distended gallbladder (no pericholecystatic inflammatory findings per imaging report): asymptomatic; OP FU with PCP with further testing/treatment and/or specialist referral at PCP's discretion      5) non-obstructive 1 mm L kidney stone: asymptomatic; not present on CT A/P of 1/26    6) low lipase level: mildly decreased at 58 (LLN 73) on 1/26 and was 50 on 1/12, evaluated by GI and no further PAULSON/intervention for low lipsie was recommended however CT abd/pelvis was ordered per IM recommendation due to drop in Hg and r/o retroperitoneal bleed which was unrevealing     DVT ppx: xarelto            32 minutes was spent from 12:10 PM to 12:42 PM preparing patient's discharge including coordinating with specialist and arranging transfer as well as discussion with patient's family

## 2021-01-30 ENCOUNTER — APPOINTMENT (INPATIENT)
Dept: NON INVASIVE DIAGNOSTICS | Facility: HOSPITAL | Age: 81
DRG: 100 | End: 2021-01-30
Payer: MEDICARE

## 2021-01-30 ENCOUNTER — APPOINTMENT (INPATIENT)
Dept: RADIOLOGY | Facility: HOSPITAL | Age: 81
DRG: 100 | End: 2021-01-30
Payer: MEDICARE

## 2021-01-30 LAB
ALBUMIN SERPL BCP-MCNC: 2.4 G/DL (ref 3.5–5)
ALP SERPL-CCNC: 61 U/L (ref 46–116)
ALT SERPL W P-5'-P-CCNC: 21 U/L (ref 12–78)
ANION GAP SERPL CALCULATED.3IONS-SCNC: 6 MMOL/L (ref 4–13)
ARTERIAL PATENCY WRIST A: YES
AST SERPL W P-5'-P-CCNC: 20 U/L (ref 5–45)
BACTERIA UR QL AUTO: ABNORMAL /HPF
BASE EXCESS BLDA CALC-SCNC: 4.9 MMOL/L
BASOPHILS # BLD AUTO: 0.03 THOUSANDS/ΜL (ref 0–0.1)
BASOPHILS NFR BLD AUTO: 1 % (ref 0–1)
BILIRUB SERPL-MCNC: 0.91 MG/DL (ref 0.2–1)
BILIRUB UR QL STRIP: NEGATIVE
BUN SERPL-MCNC: 8 MG/DL (ref 5–25)
CA-I BLD-SCNC: 1.1 MMOL/L (ref 1.12–1.32)
CALCIUM ALBUM COR SERPL-MCNC: 9.2 MG/DL (ref 8.3–10.1)
CALCIUM SERPL-MCNC: 7.9 MG/DL (ref 8.3–10.1)
CHLORIDE SERPL-SCNC: 100 MMOL/L (ref 100–108)
CLARITY UR: ABNORMAL
CO2 SERPL-SCNC: 30 MMOL/L (ref 21–32)
COLOR UR: YELLOW
CREAT SERPL-MCNC: 0.7 MG/DL (ref 0.6–1.3)
EOSINOPHIL # BLD AUTO: 0.09 THOUSAND/ΜL (ref 0–0.61)
EOSINOPHIL NFR BLD AUTO: 2 % (ref 0–6)
ERYTHROCYTE [DISTWIDTH] IN BLOOD BY AUTOMATED COUNT: 14.6 % (ref 11.6–15.1)
FINE GRAN CASTS URNS QL MICRO: ABNORMAL /LPF
GFR SERPL CREATININE-BSD FRML MDRD: 82 ML/MIN/1.73SQ M
GLUCOSE SERPL-MCNC: 113 MG/DL (ref 65–140)
GLUCOSE SERPL-MCNC: 115 MG/DL (ref 65–140)
GLUCOSE SERPL-MCNC: 148 MG/DL (ref 65–140)
GLUCOSE SERPL-MCNC: 58 MG/DL (ref 65–140)
GLUCOSE SERPL-MCNC: 84 MG/DL (ref 65–140)
GLUCOSE SERPL-MCNC: 85 MG/DL (ref 65–140)
GLUCOSE SERPL-MCNC: 87 MG/DL (ref 65–140)
GLUCOSE UR STRIP-MCNC: NEGATIVE MG/DL
HCO3 BLDA-SCNC: 28.8 MMOL/L (ref 22–28)
HCT VFR BLD AUTO: 27.8 % (ref 34.8–46.1)
HGB BLD-MCNC: 8.8 G/DL (ref 11.5–15.4)
HGB UR QL STRIP.AUTO: ABNORMAL
IMM GRANULOCYTES # BLD AUTO: 0.06 THOUSAND/UL (ref 0–0.2)
IMM GRANULOCYTES NFR BLD AUTO: 1 % (ref 0–2)
KETONES UR STRIP-MCNC: ABNORMAL MG/DL
LEUKOCYTE ESTERASE UR QL STRIP: ABNORMAL
LYMPHOCYTES # BLD AUTO: 1.37 THOUSANDS/ΜL (ref 0.6–4.47)
LYMPHOCYTES NFR BLD AUTO: 31 % (ref 14–44)
MAGNESIUM SERPL-MCNC: 2 MG/DL (ref 1.6–2.6)
MCH RBC QN AUTO: 30.3 PG (ref 26.8–34.3)
MCHC RBC AUTO-ENTMCNC: 31.7 G/DL (ref 31.4–37.4)
MCV RBC AUTO: 96 FL (ref 82–98)
MONOCYTES # BLD AUTO: 0.46 THOUSAND/ΜL (ref 0.17–1.22)
MONOCYTES NFR BLD AUTO: 10 % (ref 4–12)
NASAL CANNULA: ABNORMAL
NEUTROPHILS # BLD AUTO: 2.41 THOUSANDS/ΜL (ref 1.85–7.62)
NEUTS SEG NFR BLD AUTO: 55 % (ref 43–75)
NITRITE UR QL STRIP: NEGATIVE
NON-SQ EPI CELLS URNS QL MICRO: ABNORMAL /HPF
NRBC BLD AUTO-RTO: 0 /100 WBCS
O2 CT BLDA-SCNC: 13.9 ML/DL (ref 16–23)
OTHER STN SPEC: ABNORMAL
OXYHGB MFR BLDA: 98.4 % (ref 94–97)
PCO2 BLDA: 39.9 MM HG (ref 36–44)
PH BLDA: 7.48 [PH] (ref 7.35–7.45)
PH UR STRIP.AUTO: 6 [PH]
PHOSPHATE SERPL-MCNC: 3.6 MG/DL (ref 2.3–4.1)
PLATELET # BLD AUTO: 216 THOUSANDS/UL (ref 149–390)
PMV BLD AUTO: 9.5 FL (ref 8.9–12.7)
PO2 BLDA: 157.5 MM HG (ref 75–129)
POTASSIUM SERPL-SCNC: 3.6 MMOL/L (ref 3.5–5.3)
PROCALCITONIN SERPL-MCNC: 0.07 NG/ML
PROT SERPL-MCNC: 4.8 G/DL (ref 6.4–8.2)
PROT UR STRIP-MCNC: ABNORMAL MG/DL
RBC # BLD AUTO: 2.9 MILLION/UL (ref 3.81–5.12)
RBC #/AREA URNS AUTO: ABNORMAL /HPF
SODIUM SERPL-SCNC: 136 MMOL/L (ref 136–145)
SP GR UR STRIP.AUTO: 1.02 (ref 1–1.03)
SPECIMEN SOURCE: ABNORMAL
UROBILINOGEN UR QL STRIP.AUTO: 0.2 E.U./DL
WBC # BLD AUTO: 4.42 THOUSAND/UL (ref 4.31–10.16)
WBC #/AREA URNS AUTO: ABNORMAL /HPF

## 2021-01-30 PROCEDURE — 80053 COMPREHEN METABOLIC PANEL: CPT | Performed by: NURSE PRACTITIONER

## 2021-01-30 PROCEDURE — 81001 URINALYSIS AUTO W/SCOPE: CPT | Performed by: NURSE PRACTITIONER

## 2021-01-30 PROCEDURE — 82948 REAGENT STRIP/BLOOD GLUCOSE: CPT

## 2021-01-30 PROCEDURE — 99291 CRITICAL CARE FIRST HOUR: CPT | Performed by: STUDENT IN AN ORGANIZED HEALTH CARE EDUCATION/TRAINING PROGRAM

## 2021-01-30 PROCEDURE — 83735 ASSAY OF MAGNESIUM: CPT | Performed by: NURSE PRACTITIONER

## 2021-01-30 PROCEDURE — 82330 ASSAY OF CALCIUM: CPT | Performed by: NURSE PRACTITIONER

## 2021-01-30 PROCEDURE — 99233 SBSQ HOSP IP/OBS HIGH 50: CPT | Performed by: PSYCHIATRY & NEUROLOGY

## 2021-01-30 PROCEDURE — 84145 PROCALCITONIN (PCT): CPT | Performed by: NURSE PRACTITIONER

## 2021-01-30 PROCEDURE — NC001 PR NO CHARGE: Performed by: PSYCHIATRY & NEUROLOGY

## 2021-01-30 PROCEDURE — 87040 BLOOD CULTURE FOR BACTERIA: CPT | Performed by: NURSE PRACTITIONER

## 2021-01-30 PROCEDURE — 82805 BLOOD GASES W/O2 SATURATION: CPT | Performed by: PHYSICIAN ASSISTANT

## 2021-01-30 PROCEDURE — 84100 ASSAY OF PHOSPHORUS: CPT | Performed by: NURSE PRACTITIONER

## 2021-01-30 PROCEDURE — 85025 COMPLETE CBC W/AUTO DIFF WBC: CPT | Performed by: NURSE PRACTITIONER

## 2021-01-30 PROCEDURE — 92610 EVALUATE SWALLOWING FUNCTION: CPT

## 2021-01-30 PROCEDURE — NC001 PR NO CHARGE: Performed by: NURSE PRACTITIONER

## 2021-01-30 PROCEDURE — 71045 X-RAY EXAM CHEST 1 VIEW: CPT

## 2021-01-30 PROCEDURE — 93308 TTE F-UP OR LMTD: CPT

## 2021-01-30 PROCEDURE — 93005 ELECTROCARDIOGRAM TRACING: CPT

## 2021-01-30 RX ORDER — DEXTROSE MONOHYDRATE 25 G/50ML
25 INJECTION, SOLUTION INTRAVENOUS ONCE
Status: COMPLETED | OUTPATIENT
Start: 2021-01-30 | End: 2021-01-30

## 2021-01-30 RX ORDER — SODIUM CHLORIDE, SODIUM GLUCONATE, SODIUM ACETATE, POTASSIUM CHLORIDE, MAGNESIUM CHLORIDE, SODIUM PHOSPHATE, DIBASIC, AND POTASSIUM PHOSPHATE .53; .5; .37; .037; .03; .012; .00082 G/100ML; G/100ML; G/100ML; G/100ML; G/100ML; G/100ML; G/100ML
500 INJECTION, SOLUTION INTRAVENOUS ONCE
Status: COMPLETED | OUTPATIENT
Start: 2021-01-30 | End: 2021-01-30

## 2021-01-30 RX ORDER — POTASSIUM CHLORIDE 14.9 MG/ML
20 INJECTION INTRAVENOUS ONCE
Status: COMPLETED | OUTPATIENT
Start: 2021-01-30 | End: 2021-01-30

## 2021-01-30 RX ADMIN — PRASUGREL HYDROCHLORIDE 10 MG: 10 TABLET, FILM COATED ORAL at 09:29

## 2021-01-30 RX ADMIN — SODIUM CHLORIDE, SODIUM GLUCONATE, SODIUM ACETATE, POTASSIUM CHLORIDE, MAGNESIUM CHLORIDE, SODIUM PHOSPHATE, DIBASIC, AND POTASSIUM PHOSPHATE 500 ML: .53; .5; .37; .037; .03; .012; .00082 INJECTION, SOLUTION INTRAVENOUS at 02:16

## 2021-01-30 RX ADMIN — ACETAMINOPHEN 650 MG: 325 TABLET, FILM COATED ORAL at 20:47

## 2021-01-30 RX ADMIN — CEFEPIME HYDROCHLORIDE 2000 MG: 2 INJECTION, POWDER, FOR SOLUTION INTRAVENOUS at 22:48

## 2021-01-30 RX ADMIN — RIVAROXABAN 15 MG: 15 TABLET, FILM COATED ORAL at 16:05

## 2021-01-30 RX ADMIN — ATORVASTATIN CALCIUM 20 MG: 20 TABLET, FILM COATED ORAL at 16:05

## 2021-01-30 RX ADMIN — POTASSIUM CHLORIDE 20 MEQ: 14.9 INJECTION, SOLUTION INTRAVENOUS at 04:51

## 2021-01-30 RX ADMIN — LEVETIRACETAM 500 MG: 100 INJECTION, SOLUTION INTRAVENOUS at 20:47

## 2021-01-30 RX ADMIN — SODIUM CHLORIDE, SODIUM GLUCONATE, SODIUM ACETATE, POTASSIUM CHLORIDE, MAGNESIUM CHLORIDE, SODIUM PHOSPHATE, DIBASIC, AND POTASSIUM PHOSPHATE 500 ML: .53; .5; .37; .037; .03; .012; .00082 INJECTION, SOLUTION INTRAVENOUS at 04:51

## 2021-01-30 RX ADMIN — Medication 250 MG: at 17:19

## 2021-01-30 RX ADMIN — Medication 250 MG: at 09:29

## 2021-01-30 RX ADMIN — LEVETIRACETAM 500 MG: 100 INJECTION, SOLUTION INTRAVENOUS at 08:46

## 2021-01-30 RX ADMIN — DEXTROSE MONOHYDRATE 25 ML: 500 INJECTION PARENTERAL at 06:43

## 2021-01-30 RX ADMIN — METRONIDAZOLE 500 MG: 500 INJECTION, SOLUTION INTRAVENOUS at 21:04

## 2021-01-30 NOTE — ASSESSMENT & PLAN NOTE
Wt Readings from Last 3 Encounters:   01/27/21 70 kg (154 lb 4 8 oz)   01/21/21 63 7 kg (140 lb 6 4 oz)   01/16/21 65 3 kg (143 lb 15 4 oz)     grade 1 DD  - EF 45%

## 2021-01-30 NOTE — SPEECH THERAPY NOTE
Bedside Swallow Evaluation:    Summary:  The patient is very lethargic and requires verbal cues throughout to maintain alertness  She does present with a mild oropharyngeal dysphagia characterized by decreased bolus control with thin liquids, with intermittently delayed swallow initiation time and cough response with thin liquids  Patient is only assessed with conservative puree items due to lethargy  Recommendations:  Diet: Puree  Liquid: Nectar thick   Meds: Straw   Supervision: Feed  Positioning:Upright  Strategies: Pt to take PO/Meds only when fully alert and upright  Oral care: As needed   Aspiration precautions    Therapy Prognosis: Fair  Prognosis considerations: Lethargy and dysphagia from ARC therapy   Frequency: 3-5x week, as able     Goal(s):  Pt will tolerate trials of thin liquids without overt s/s aspiration x1-3 sessions   Pt will tolerate trials of mechanical soft solids with adequate mastication time and no oral residue      Patient's goal: none stated     From H&P:   Albaro Rios is a 79 y o  female with a PMH of hypertension, CAD status post 4 stents, heart failure with preserved ejection fraction, hyperlipidemia who presents with new onset seizure like activity  Patient was being seen in the [facility] when a rapid response and stroke alert were called because she developed tremor like movements in all 4 extremities, eyes rolled up into her head, and right gaze preference  Did not have facial droop, focal weakness, and did not respond to questions  Patient responded with "ouch" or "no" to noxious stimuli  CT head performed demonstrating no acute abnormality  CTA head and neck unable to be acquired due to poor IV access given significant peripheral vascular disease       Consider consult w/:  Nutrition    Reason for consult:  R/o aspiration  Determine safest and least restrictive diet  Change in mental status  New neuro event  h/o dysphagia     Precautions:  Contact    Current diet:  NPO  Premorbid diet[de-identified]  Level 3 with thin liquids while on ARC  Previous VBS:  None   O2 requirement:  NC  Voice/Speech:  Minimal verbalizations   Follows commands:  Unable                         Cognitive Status:  Cooperative, but lethargic   Oral mech exam:  Dentition: wfl-natural   Not fully assesses as patient is unable to follow commands   Oral care provided     Items administered:  Puree, honey thick liquid, nectar thick liquid, thin liquids  Liquids were taken by tsp/straw       Oral stage:  Lip closure: wfl  Mastication: wfl  Bolus formation: wfl  Bolus control: reduced with thin liquids   Transfer: wfl  Oral residue: no  Pocketing: no    Pharyngeal stage:  Swallow promptness: intermittently delayed   Laryngeal rise: wfl  Wet voice: no  Throat clear: no  Cough: with thin liquids   Secondary swallows: no  Audible swallows: no    Esophageal stage:  No s/s reported    Aspiration precautions posted    Results d/w:  Pt, nursing

## 2021-01-30 NOTE — ASSESSMENT & PLAN NOTE
Lab Results   Component Value Date    EGFR 66 01/29/2021    EGFR 69 01/29/2021    EGFR 87 01/27/2021    CREATININE 0 83 01/29/2021    CREATININE 0 80 01/29/2021    CREATININE 0 58 (L) 01/27/2021

## 2021-01-30 NOTE — PLAN OF CARE
Summary:  The patient is very lethargic and requires verbal cues throughout to maintain alertness  She does present with a mild oropharyngeal dysphagia characterized by decreased bolus control with thin liquids, with intermittently delayed swallow initiation time and cough response with thin liquids  Patient is only assessed with conservative puree items due to lethargy  Recommendations:  Diet: Puree  Liquid: Nectar thick   Meds: Straw   Supervision: Feed  Positioning:Upright  Strategies: Pt to take PO/Meds only when fully alert and upright     Oral care: As needed   Aspiration precautions     Therapy Prognosis: Fair  Prognosis considerations: Lethargy and dysphagia from [facility] therapy   Frequency: 3-5x week, as able      Goal(s):  Pt will tolerate trials of thin liquids without overt s/s aspiration x1-3 sessions   Pt will tolerate trials of mechanical soft solids with adequate mastication time and no oral residue

## 2021-01-30 NOTE — PROCEDURES
Central Line Insertion    Date/Time: 1/29/2021 6:10 PM  Performed by: So Cruz DO  Authorized by: So Cruz DO     Patient location:  Bedside  Consent:     Consent obtained:  Emergent situation  Universal protocol:     Patient identity confirmed: Anonymous protocol, patient vented/unresponsive  Pre-procedure details:     Hand hygiene: Hand hygiene performed prior to insertion      Sterile barrier technique: All elements of maximal sterile technique followed      Skin preparation:  2% chlorhexidine  Indications:     Central line indications: medications requiring central line    Anesthesia (see MAR for exact dosages): Anesthesia method:  None  Procedure details:     Location:  Right femoral    Vessel type: vein      Laterality:  Right    Approach: percutaneous technique used      Patient position:  Flat    Catheter type:  Triple lumen    Catheter size:  7 Fr    Landmarks identified: yes      Ultrasound guidance: no      Number of attempts:  1    Successful placement: yes    Post-procedure details:     Post-procedure:  Dressing applied and line sutured    Assessment:  Blood return through all ports and free fluid flow    Patient tolerance of procedure:   Tolerated well, no immediate complications

## 2021-01-30 NOTE — PROGRESS NOTES
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 Plan    · Routine/spot  EEG pending  · Continue Keppra 500 mg b i d  · Will obtain MRI brain when able  · Continue seizure precautions  · Page Neurology for any abnormal/seziure like  movements  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[facility])  Assessment & Plan  · Pacemaker placed  on 1/19/2021  · Management per primary team     A-fib [facility])  Assessment & Plan  · Recently diagnosed with a new onset AFib  · Continue Xarelto    Essential hypertension  Assessment & Plan  · Management per primary team        SUBJECTIVE     Patient was seen and examined  Patient had another episode yesterday where she had right-sided case locked with rhythmic twitching lasting approximately 10 minutes terminated with 1 mg of Ativan, unfortunately after Ativan and Keppra administration patient became obtunded sonorous respirations and hypertension  During my evaluation  Patient's eyes were closed, she seemed lethargic/encephalopathic  Able to tell me her name, hospital's name correctly  Follows one-step commands  Does not offer any complaints      ROS- unable to obtain due to change in mental status    OBJECTIVE     Patient ID: Toni Finn is a 79 y o  female      Vitals:    01/30/21 1500 01/30/21 1600 01/30/21 1700 01/30/21 1800   BP: 141/57 136/56 134/54 140/51   Pulse: 84 88 88 96   Resp: 22 22 (!) 24 (!) 25   Temp: 99 7 °F (37 6 °C) 100 °F (37 8 °C) 100 °F (37 8 °C) 100 4 °F (38 °C)   TempSrc:       SpO2: 96% 93% 93% 92%   Weight:          Temperature:   Temp (24hrs), Av °F (37 2 °C), Min:97 9 °F (36 6 °C), Max:100 4 °F (38 °C)    Temperature: 100 4 °F (38 °C)      Physical Exam  Vitals signs and nursing note reviewed  Constitutional:       General: She is not in acute distress  Appearance: She is well-developed  Comments: Lethargic   HENT:      Head: Normocephalic and atraumatic  Eyes:      Conjunctiva/sclera: Conjunctivae normal    Neck:      Musculoskeletal: Neck supple  Cardiovascular:      Heart sounds: No murmur  Pulmonary:      Effort: Pulmonary effort is normal  No respiratory distress  Abdominal:      Palpations: Abdomen is soft  Tenderness: There is no abdominal tenderness  Skin:     General: Skin is warm and dry  Neurological Examination:     Mental Status: The patient's eyes were closed, she was able to open her eyes after multiple verbal commands  She tells me her name, she thinks she is in the hospital month   She still seems lethargic,   President's name Brent Hamper  No dysarthria or aphasia noted    Cranial Nerves:   II: visual fields Pupils equal, round, reactive to light with normal accomodation  III,IV,VI: extraocular muscles EOMI, no nystagmus   V: masseter and pterygoid strength full  Sensation in the V1 through V3 distributions intact to pinprick bilaterally  VII: Face is symmetric with no weakness noted  IX/X: Uvula midline  Soft palate elevation symmetric  XI: Trapezius and SCM seems intact   XII: Tongue midline with no atrophy or fasciculations with appropriate movement  Motor Examination:   No pronator drift  Bulk: Normal  No atrophy Tone: Normal  Fasciculations: None       Patient was able to lift bilateral upper extremities against gravity, does not participate in formal testing, hand  bilateral symmetrical    No focal weakness noted  Patient was able to wiggle her toes bilateral lower extremity  She was able to lift her bilateral legs against gravity  She does not participate in formal testing  No focal weakness noted     Reflexes:                   Biceps Brachioradialis Triceps Patella Achilles Plantars   Right          2+            2+                  2+        2+       2+         Down   Left            2+             2+                 2+         2+       2+         Down     Clonus: None    Pathological Reflexes:  Hoffmans: negative  Babinsky: negative  Jaw Jerk: negative    Coordination: Patient able to perform normal finger-to-nose and heel to shin appropriately  Normal rapid alternating movements  Sensory: Normal sensation to light touch, pin prick bilateral lower and upper extremity    Gait:  Deferred    LABORATORY DATA     Labs: I have personally reviewed pertinent reports  Results from last 7 days   Lab Units 01/30/21  0350 01/29/21  1746 01/29/21  1156  01/29/21  0547 01/27/21  0545   WBC Thousand/uL 4 42  --  5 19  --  4 92 4 44   HEMOGLOBIN g/dL 8 8*  --  10 1*  --  10 2* 9 2*   I STAT HEMOGLOBIN g/dl  --  8 5*  --    < >  --   --    HEMATOCRIT % 27 8*  --  31 3*  --  32 1* 28 0*   HEMATOCRIT, ISTAT %  --  25*  --    < >  --   --    PLATELETS Thousands/uL 216  --  251  --  260 230   NEUTROS PCT % 55  --   --   --  49 67   MONOS PCT % 10  --   --   --  14* 13*    < > = values in this interval not displayed        Results from last 7 days   Lab Units 01/30/21  0350 01/29/21  1746 01/29/21  1156 01/29/21  1146 01/29/21  0547  01/26/21  1028  01/26/21  0551   SODIUM mmol/L 136  --  133*  --  135*   < >  --   --  130*   POTASSIUM mmol/L 3 6  --  4 1  --  3 5   < >  --   --  3 8   CHLORIDE mmol/L 100  --  101  --  102   < >  --   --  97*   CO2 mmol/L 30  --  29  --  25   < >  --   --  28   CO2, I-STAT mmol/L  --  33*  --  30  --   --  29   < >  --    BUN mg/dL 8  --  8  -- 6   < >  --   --  11   CREATININE mg/dL 0 70  --  0 83  --  0 80   < >  --   --  0 82   CALCIUM mg/dL 7 9*  --  9 3  --  8 9   < >  --   --  8 3   ALK PHOS U/L 61  --   --   --   --   --   --   --  58   ALT U/L 21  --   --   --   --   --   --   --  23   AST U/L 20  --   --   --   --   --   --   --  21   GLUCOSE, ISTAT mg/dl  --  106  --  105  --   --  110  --   --     < > = values in this interval not displayed  Results from last 7 days   Lab Units 01/30/21  0350   MAGNESIUM mg/dL 2 0     Results from last 7 days   Lab Units 01/30/21  0350   PHOSPHORUS mg/dL 3 6      Results from last 7 days   Lab Units 01/29/21  1156   INR  2 89*   PTT seconds 56*     Results from last 7 days   Lab Units 01/26/21  1048   LACTIC ACID mmol/L 1 2     Results from last 7 days   Lab Units 01/29/21  1156 01/26/21  1049   TROPONIN I ng/mL 0 02 0 04       IMAGING & DIAGNOSTIC TESTING     Radiology Results: I have personally reviewed pertinent reports  XR chest portable    (Results Pending)       Other Diagnostic Testing: I have personally reviewed pertinent reports        ACTIVE MEDICATIONS     Current Facility-Administered Medications   Medication Dose Route Frequency    acetaminophen (TYLENOL) tablet 650 mg  650 mg Oral Q6H PRN    atorvastatin (LIPITOR) tablet 20 mg  20 mg Oral Daily With Dinner    insulin lispro (HumaLOG) 100 units/mL subcutaneous injection 1-5 Units  1-5 Units Subcutaneous Q6H [facility]    levETIRAcetam (KEPPRA) 500 mg in sodium chloride 0 9 % 100 mL IVPB  500 mg Intravenous Q12H [facility]    ondansetron (ZOFRAN) injection 4 mg  4 mg Intravenous Q6H PRN    oxyCODONE (ROXICODONE) IR tablet 2 5 mg  2 5 mg Oral Q4H PRN    prasugrel (EFFIENT) tablet 10 mg  10 mg Oral Daily    psyllium (METAMUCIL) 1 packet  1 packet Oral Daily    rivaroxaban (XARELTO) tablet 15 mg  15 mg Oral Daily With Dinner    saccharomyces boulardii (FLORASTOR) capsule 250 mg  250 mg Oral BID       Prior to Admission medications    Medication Sig Start Date End Date Taking? Authorizing Provider   amLODIPine (NORVASC) 10 mg tablet Take 1 tablet (10 mg total) by mouth daily 12/24/20   Francoise Garcia PA-C   apixaban (ELIQUIS) 5 mg Take 1 tablet (5 mg total) by mouth 2 (two) times a day 1/18/21   Anjelica Lozano PA-C   aspirin (ECOTRIN LOW STRENGTH) 81 mg EC tablet Take 81 mg by mouth daily  Historical Provider, MD   atorvastatin (LIPITOR) 20 mg tablet Take 1 tablet (20 mg total) by mouth daily with dinner 12/5/20   Catherne Bellbrook, DO   docusate sodium (Colace) 100 mg capsule Take 1 capsule (100 mg total) by mouth 2 (two) times a day 12/5/20   Catherne Bellbrook, DO   furosemide (LASIX) 40 mg tablet Take 1 tablet (40 mg total) by mouth daily 12/6/20   Catherne Bellbrook, DO   isosorbide mononitrate (IMDUR) 30 mg 24 hr tablet Take 3 tablets (90 mg total) by mouth daily 1/7/21   Liza Mendoza MD   metoprolol succinate (TOPROL-XL) 50 mg 24 hr tablet Take 1 tablet (50 mg total) by mouth daily 1/7/21   Liza Mendoza MD   multivitamin [facility]) TABS Take 1 tablet by mouth daily      Historical Provider, MD   nitroglycerin (NITROSTAT) 0 4 mg SL tablet PLACE 1 TABLET (0 4 MG TOTAL) UNDER THE TONGUE EVERY 5 (FIVE) MINUTES AS NEEDED FOR CHEST PAIN 1/6/21   Liza Mendoza MD   pantoprazole (PROTONIX) 40 mg tablet Take 1 tablet (40 mg total) by mouth daily in the early morning 12/24/20   Francoise Garcia PA-C   polyethylene glycol (MIRALAX) 17 g packet Take 17 g by mouth daily 12/6/20   Catherne Bellbrook, DO   prasugrel (EFFIENT) tablet TAKE 1 TABLET EVERY OTHER DAY 12/27/20   Liza Mendoza MD   ranolazine (RANEXA) 1000 MG SR tablet Take 1,000 mg by mouth 2 (two) times a day    Historical Provider, MD   rivaroxaban (XARELTO) 20 mg tablet Take 1 tablet (20 mg total) by mouth daily with breakfast 1/18/21   CARLY Lux (SENOKOT) 8 6 mg Take 1 tablet (8 6 mg total) by mouth daily at bedtime 12/5/20   Anoop Bynum,      VTE Mechanical Prophylaxis: sequential compression device    ==  MD [facility], PGY-2

## 2021-01-30 NOTE — PROGRESS NOTES
Daily Progress Note - Critical Care   Rosie Conner 79 y o  female MRN: [id number]  Unit/Bed#: [facility] 295-71 Encounter: [id number]        ----------------------------------------------------------------------------------------  HPI/24hr events:   Deterioration in mental status  RRT in [facility] yesterday  Stroke alert/seizure like activity (Keppra and ativan)  Hypotension-> NE  Femoral TLC placed  O/N: Fluid bolus 1 5 L Isolyte (NE and oliguria)    1/07-1/17 [facility]: CC for CP, LHC, LAMINE LM and LAD (1/11/2021), aspiration PNA (completed abx course), afib with conversion sinus pause tx to [facility] for EP  1/17-1/21 [facility]: PPM dual chamber placed 1/19/2021 1/21 Admit to [facility]-> 1/29 RRT admitted to [facility] and tx to ICU    ---------------------------------------------------------------------------------------  SUBJECTIVE  Unable to obtain due to mental status  Overnight events discussed with RN at bedside    Review of Systems   Unable to perform ROS: Mental status change     Review of systems was reviewed and negative unless stated above in HPI/24-hour events   ---------------------------------------------------------------------------------------  Assessment and Plan:    Neuro:  Diagnosis: Encephalopathy ( toxic metabolic), Seizure like activity  Plan:  · Serial Neuro Exams  · Spot EEG read pending  · CTH negative  · Seizure precautions  · Neurology following  · Continue Keppra 500mg BID  Cardiac  Diagnosis: Shock, history of HTN and HLD, afib with tachy-jillian syndrome s/p PPM, afib (now nsr), combined systolic and diastolic HF  Plan:  · Cardiac infusions: Levophed,held this AM   · MAP goal > 65  · Rhythm: NSR  · Follow rhythm on telemetry  · Home antihypertensives on hold  · Continue Statin  · Continue Effient  · Continue Xarelto  · PPM in place  Pulmonary  Diagnosis: Acute respiratory insufficiency  Plan:  · Pulmonary toileting  · Wean FiO2 as able  Gastrointestinal  Diagnosis: No acute issues  Plan:  · Bowel regimen: Psyllium daily  · Continue Florastor  FEN  Diagnosis: Hypokalemia  Plan:  · Fluid/Diuretic plan: Hold diuretics  · Goal 24 hour fluid balance: euvolemia, net positive  · Nutrition/diet plan: NPO  · Replete electrolytes with goals: K >4 0, Mag >2 0, and Phos >3 0  · Bedside dysphagia evaluation today  Genitourinary  Diagnosis: Urinary retention  Plan:  · Indwelling Saeed present: yes , placed 1/23 (failued urinary retention protocol previously)  · Trend UOP and BUN/creat  · Strict I and O  ·   Infectious Diease  Diagnosis: No acute issues  Plan  · Trend temps and WBC count  Heme:   Diagnosis: Chronic Anemia  Plan:  · Trend hgb and plts  · Transfuse as needed for goal hgb >7  · Continue Xarelto  Endo:   Diagnosis: No acute issues  Plan:   SSI alg #1, no coverage required   Adjust insulin regimen as needed to maintain goal -180  Lab Results   Component Value Date    HGBA1C 5 5 02/05/2019   ·   MSK/Skin:  · Early Mobility/Exercise  · PT consult: yes  · OT consult: yes  · Turn and position patient Q2 hours  · Off load pressure points  · Allevyn preventative per protocol  Family:  · Family updated within 24 hours: yes       Disposition: Continue Critical Care   Code Status: Level 1 - Full Code  ---------------------------------------------------------------------------------------  ICU CORE MEASURES    Prophylaxis   VTE Pharmacologic Prophylaxis: Rivaroxaban (Xarelto)  VTE Mechanical Prophylaxis: sequential compression device  Stress Ulcer Prophylaxis: Prophylaxis Not Indicated     ABCDE Protocol (if indicated)  Plan to perform spontaneous awakening trial today? Not applicable  Plan to perform spontaneous breathing trial today? Not applicable  Obvious barriers to extubation?  Not applicable  CAM-ICU: pt unable to participate due to mental status    Invasive Devices Review  Invasive Devices     Central Venous Catheter Line            CVC Central Lines 01/29/21 Triple Right Femoral less than 1 day Peripheral Intravenous Line            Peripheral IV 21 Right Hand less than 1 day          Drain            Urethral Catheter Other (Comment) 16 Fr  6 days              Can any invasive devices be discontinued today? No  ---------------------------------------------------------------------------------------  OBJECTIVE    Vitals   Vitals:    21 0300 21 0350 21 0405 21 0420   BP: (!) 122/42 124/62 (!) 114/35 121/56   Pulse: 70 68 70 72   Resp: 12   13   Temp: 98 2 °F (36 8 °C)   97 9 °F (36 6 °C)   TempSrc:       SpO2: 100%   97%   Weight:         Temp (24hrs), Av 1 °F (36 7 °C), Min:97 6 °F (36 4 °C), Max:98 6 °F (37 °C)  Current: Temperature: 97 9 °F (36 6 °C)  HR: 70  BP: 118/42 (67)  RR: 12  SpO2: 100%    Respiratory:  SpO2: SpO2: 97 %  Nasal Cannula O2 Flow Rate (L/min): 4 L/min    Invasive/non-invasive ventilation settings   Respiratory    Lab Data (Last 4 hours)    None         O2/Vent Data (Last 4 hours)    None                Physical Exam  Constitutional:       General: She is in acute distress  Appearance: She is ill-appearing  HENT:      Head: Normocephalic and atraumatic  Mouth/Throat:      Mouth: Mucous membranes are dry  Eyes:      Pupils: Pupils are equal, round, and reactive to light  Cardiovascular:      Rate and Rhythm: Normal rate and regular rhythm  Pulses:           Radial pulses are 2+ on the right side and 2+ on the left side  Popliteal pulses are 1+ on the right side and 1+ on the left side  Heart sounds: No murmur  No friction rub  No gallop  Pulmonary:      Effort: No accessory muscle usage or respiratory distress  Breath sounds: Examination of the right-lower field reveals decreased breath sounds  Examination of the left-lower field reveals decreased breath sounds  Decreased breath sounds present  Abdominal:      General: Abdomen is flat  Bowel sounds are normal       Palpations: Abdomen is soft        Tenderness: There is no abdominal tenderness  Skin:     General: Skin is warm  Capillary Refill: Capillary refill takes less than 2 seconds  Nails: There is no clubbing  Neurological:      Comments: Pt follows simple commands,   Hand grasps, wiggles toes, tongue midline  Does not lift extremities against gravity  Pt states "What" when asked questions         Laboratory and Diagnostics:  Results from last 7 days   Lab Units 01/30/21  0350 01/29/21  1746 01/29/21  1156 01/29/21  1146 01/29/21  0547 01/27/21  0545 01/26/21  1048  01/26/21  0551 01/25/21  1150 01/25/21  0620   WBC Thousand/uL 4 42  --  5 19  --  4 92 4 44 4 51  --  5 03 6 05 5 46   HEMOGLOBIN g/dL 8 8*  --  10 1*  --  10 2* 9 2* 8 3*  --  7 9* 8 6* 8 0*   I STAT HEMOGLOBIN g/dl  --  8 5*  --  9 5*  --   --   --    < >  --   --   --    HEMATOCRIT % 27 8*  --  31 3*  --  32 1* 28 0* 26 8*  --  24 7* 26 7* 25 0*   HEMATOCRIT, ISTAT %  --  25*  --  28*  --   --   --    < >  --   --   --    PLATELETS Thousands/uL 216  --  251  --  260 230 244  --  243 245 219   NEUTROS PCT % 55  --   --   --  49 67 76*  --  67 69 71   MONOS PCT % 10  --   --   --  14* 13* 10  --  14* 14* 13*    < > = values in this interval not displayed       Results from last 7 days   Lab Units 01/30/21  0350 01/29/21  1746 01/29/21  1156 01/29/21  1146 01/29/21  0547 01/27/21  0545 01/26/21  1028 01/26/21  0551 01/25/21  0620 01/24/21  0501   SODIUM mmol/L 136  --  133*  --  135* 135*  --  130* 132* 131*   POTASSIUM mmol/L 3 6  --  4 1  --  3 5 3 7  --  3 8 3 3* 3 6   CHLORIDE mmol/L 100  --  101  --  102 102  --  97* 97* 97*   CO2 mmol/L 30  --  29  --  25 29  --  28 30 28   CO2, I-STAT mmol/L  --  33*  --  30  --   --  29  --   --   --    ANION GAP mmol/L 6  --  3*  --  8 4  --  5 5 6   BUN mg/dL 8  --  8  --  6 6  --  11 6 6   CREATININE mg/dL 0 70  --  0 83  --  0 80 0 58*  --  0 82 0 59* 0 57*   CALCIUM mg/dL 7 9*  --  9 3  --  8 9 8 4  --  8 3 8 4 7 8*   GLUCOSE RANDOM mg/dL 87 --  99  --  81 86  --  87 94 89   ALT U/L 21  --   --   --   --   --   --  23  --   --    AST U/L 20  --   --   --   --   --   --  21  --   --    ALK PHOS U/L 61  --   --   --   --   --   --  58  --   --    ALBUMIN g/dL 2 4*  --   --   --   --   --   --  2 2*  --   --    TOTAL BILIRUBIN mg/dL 0 91  --   --   --   --   --   --  1 12*  --   --      Results from last 7 days   Lab Units 01/30/21  0350   MAGNESIUM mg/dL 2 0   PHOSPHORUS mg/dL 3 6      Results from last 7 days   Lab Units 01/29/21  1156   INR  2 89*   PTT seconds 56*      Results from last 7 days   Lab Units 01/29/21  1156 01/26/21  1049   TROPONIN I ng/mL 0 02 0 04     Results from last 7 days   Lab Units 01/26/21  1048   LACTIC ACID mmol/L 1 2     ABG:    VBG:          Micro        EKG: SR on tele  Imaging:  I have personally reviewed pertinent reports  and I have personally reviewed pertinent films in PACS   [facility] : No acute patology    Intake and Output  I/O       01/28 0701 - 01/29 0700 01/29 0701 - 01/30 0700    P  O   0    I V  (mL/kg)  1076 1 (16 7)    IV Piggyback  350    Total Intake(mL/kg)  1426 1 (22 1)    Urine (mL/kg/hr)  260    Stool  0    Total Output  260    Net  +1166 1          Unmeasured Stool Occurrence  1 x        UOP: 25/hour       Height and Weights      IBW: -92 5 kg  Body mass index is 29 66 kg/m²  Weight (last 2 days)     Date/Time   Weight    01/29/21 1902   64 4 (141 9)                Nutrition       Diet Orders   (From admission, onward)             Start     Ordered    01/29/21 1859  Diet NPO  Diet effective now     Question Answer Comment   Diet Type NPO    RD to adjust diet per protocol?  Yes        01/29/21 1901                  Active Medications  Scheduled Meds:  Current Facility-Administered Medications   Medication Dose Route Frequency Provider Last Rate    acetaminophen  650 mg Oral Q6H PRN Marah Curry PA-C      atorvastatin  20 mg Oral Daily With [employer], PA-C      chlorhexidine  15 mL [facility] [address] [place], [place]      insulin lispro  1-5 Units Subcutaneous Q6H [address] [place], [place]      levETIRAcetam  500 mg Intravenous Q12H [address] [place], [place]      norepinephrine  1-30 mcg/min Intravenous Titrated Sammy Arellano PA-C Stopped (01/30/21 0352)    ondansetron  4 mg Intravenous Q6H PRN Christianson CARLY Arellano      oxyCODONE  2 5 mg Oral Q4H PRN Sammy Arellano PA-C      [facility]rel  [facility]      psyllium  1 packet Oral Daily [place], [place]      rivaroxaban  15 mg Oral Daily With [employer] CARLY Massey      saccharomyces boulardii  250 mg Oral BID Sammy Arellano PA-C       Continuous Infusions:  norepinephrine, 1-30 mcg/min, Last Rate: Stopped (01/30/21 0352)      PRN Meds:   acetaminophen, 650 mg, Q6H PRN  ondansetron, 4 mg, Q6H PRN  oxyCODONE, 2 5 mg, Q4H PRN        Allergies   Allergies   Allergen Reactions    Sulfa Antibiotics Anaphylaxis    Formaldehyde     Codeine Palpitations     ---------------------------------------------------------------------------------------  Advance Directive and Living Will:      Power of :    POLST:    ---------------------------------------------------------------------------------------  Care Time Delivered:   No Critical Care time spent     KATHY Rivera      Portions of the record may have been created with voice recognition software  Occasional wrong word or "sound a like" substitutions may have occurred due to the inherent limitations of voice recognition software    Read the chart carefully and recognize, using context, where substitutions have occurred

## 2021-01-30 NOTE — PROGRESS NOTES
Sherley Quiroz and daughter Krish Walter updated via phone  Aware that patient has periods of lethargy and periods of alertness  No seizure activity today, and is likely post-ictal  Aware that she will be maintained on keppra and nephrology is following  We also discussed improvement in BP and stable oxygenation today  All questions answered to their satisfaction

## 2021-01-30 NOTE — PHYSICAL THERAPY NOTE
Physical Therapy Cancellation Note     01/30/21 0833   PT Last Visit   PT Visit Date 01/30/21   Note Type   Note type Evaluation   Cancel Reasons Medical status  (lethargic and not following commands- s/w RN- will hold)

## 2021-01-31 ENCOUNTER — APPOINTMENT (INPATIENT)
Dept: RADIOLOGY | Facility: HOSPITAL | Age: 81
DRG: 100 | End: 2021-01-31
Payer: MEDICARE

## 2021-01-31 LAB
ALBUMIN SERPL BCP-MCNC: 2.9 G/DL (ref 3.5–5)
ALP SERPL-CCNC: 79 U/L (ref 46–116)
ALT SERPL W P-5'-P-CCNC: 27 U/L (ref 12–78)
ANION GAP SERPL CALCULATED.3IONS-SCNC: 5 MMOL/L (ref 4–13)
ANION GAP SERPL CALCULATED.3IONS-SCNC: 6 MMOL/L (ref 4–13)
AST SERPL W P-5'-P-CCNC: 28 U/L (ref 5–45)
ATRIAL RATE: 101 BPM
ATRIAL RATE: 128 BPM
BASOPHILS # BLD MANUAL: 0 THOUSAND/UL (ref 0–0.1)
BASOPHILS NFR MAR MANUAL: 0 % (ref 0–1)
BILIRUB SERPL-MCNC: 1.06 MG/DL (ref 0.2–1)
BUN SERPL-MCNC: 5 MG/DL (ref 5–25)
BUN SERPL-MCNC: 6 MG/DL (ref 5–25)
CALCIUM ALBUM COR SERPL-MCNC: 9.6 MG/DL (ref 8.3–10.1)
CALCIUM SERPL-MCNC: 8.2 MG/DL (ref 8.3–10.1)
CALCIUM SERPL-MCNC: 8.7 MG/DL (ref 8.3–10.1)
CHLORIDE SERPL-SCNC: 101 MMOL/L (ref 100–108)
CHLORIDE SERPL-SCNC: 102 MMOL/L (ref 100–108)
CO2 SERPL-SCNC: 29 MMOL/L (ref 21–32)
CO2 SERPL-SCNC: 30 MMOL/L (ref 21–32)
CREAT SERPL-MCNC: 0.65 MG/DL (ref 0.6–1.3)
CREAT SERPL-MCNC: 0.81 MG/DL (ref 0.6–1.3)
EOSINOPHIL # BLD MANUAL: 0 THOUSAND/UL (ref 0–0.4)
EOSINOPHIL NFR BLD MANUAL: 0 % (ref 0–6)
ERYTHROCYTE [DISTWIDTH] IN BLOOD BY AUTOMATED COUNT: 14.7 % (ref 11.6–15.1)
ERYTHROCYTE [DISTWIDTH] IN BLOOD BY AUTOMATED COUNT: 14.7 % (ref 11.6–15.1)
GFR SERPL CREATININE-BSD FRML MDRD: 68 ML/MIN/1.73SQ M
GFR SERPL CREATININE-BSD FRML MDRD: 84 ML/MIN/1.73SQ M
GLUCOSE SERPL-MCNC: 107 MG/DL (ref 65–140)
GLUCOSE SERPL-MCNC: 108 MG/DL (ref 65–140)
GLUCOSE SERPL-MCNC: 125 MG/DL (ref 65–140)
GLUCOSE SERPL-MCNC: 139 MG/DL (ref 65–140)
GLUCOSE SERPL-MCNC: 61 MG/DL (ref 65–140)
GLUCOSE SERPL-MCNC: 99 MG/DL (ref 65–140)
HCT VFR BLD AUTO: 29.6 % (ref 34.8–46.1)
HCT VFR BLD AUTO: 34.7 % (ref 34.8–46.1)
HGB BLD-MCNC: 11 G/DL (ref 11.5–15.4)
HGB BLD-MCNC: 9.5 G/DL (ref 11.5–15.4)
LACTATE SERPL-SCNC: 1.1 MMOL/L (ref 0.5–2)
LACTATE SERPL-SCNC: 2.5 MMOL/L (ref 0.5–2)
LYMPHOCYTES # BLD AUTO: 2.48 THOUSAND/UL (ref 0.6–4.47)
LYMPHOCYTES # BLD AUTO: 23 % (ref 14–44)
MAGNESIUM SERPL-MCNC: 1.8 MG/DL (ref 1.6–2.6)
MAGNESIUM SERPL-MCNC: 2 MG/DL (ref 1.6–2.6)
MCH RBC QN AUTO: 30.6 PG (ref 26.8–34.3)
MCH RBC QN AUTO: 30.8 PG (ref 26.8–34.3)
MCHC RBC AUTO-ENTMCNC: 31.7 G/DL (ref 31.4–37.4)
MCHC RBC AUTO-ENTMCNC: 32.1 G/DL (ref 31.4–37.4)
MCV RBC AUTO: 96 FL (ref 82–98)
MCV RBC AUTO: 97 FL (ref 82–98)
MONOCYTES # BLD AUTO: 0.86 THOUSAND/UL (ref 0–1.22)
MONOCYTES NFR BLD: 8 % (ref 4–12)
MYELOCYTES NFR BLD MANUAL: 1 % (ref 0–1)
NEUTROPHILS # BLD MANUAL: 5.49 THOUSAND/UL (ref 1.85–7.62)
NEUTS BAND NFR BLD MANUAL: 4 % (ref 0–8)
NEUTS SEG NFR BLD AUTO: 47 % (ref 43–75)
NRBC BLD AUTO-RTO: 0 /100 WBCS
P AXIS: 56 DEGREES
PHOSPHATE SERPL-MCNC: 3.3 MG/DL (ref 2.3–4.1)
PHOSPHATE SERPL-MCNC: 3.4 MG/DL (ref 2.3–4.1)
PLATELET # BLD AUTO: 235 THOUSANDS/UL (ref 149–390)
PLATELET # BLD AUTO: 325 THOUSANDS/UL (ref 149–390)
PLATELET BLD QL SMEAR: ADEQUATE
PMV BLD AUTO: 9.5 FL (ref 8.9–12.7)
PMV BLD AUTO: 9.9 FL (ref 8.9–12.7)
POIKILOCYTOSIS BLD QL SMEAR: PRESENT
POLYCHROMASIA BLD QL SMEAR: PRESENT
POTASSIUM SERPL-SCNC: 4 MMOL/L (ref 3.5–5.3)
POTASSIUM SERPL-SCNC: 4.2 MMOL/L (ref 3.5–5.3)
PR INTERVAL: 125 MS
PROCALCITONIN SERPL-MCNC: <0.05 NG/ML
PROT SERPL-MCNC: 6.2 G/DL (ref 6.4–8.2)
QRS AXIS: 135 DEGREES
QRS AXIS: 71 DEGREES
QRSD INTERVAL: 129 MS
QRSD INTERVAL: 146 MS
QT INTERVAL: 329 MS
QT INTERVAL: 400 MS
QTC INTERVAL: 481 MS
QTC INTERVAL: 519 MS
RBC # BLD AUTO: 3.08 MILLION/UL (ref 3.81–5.12)
RBC # BLD AUTO: 3.59 MILLION/UL (ref 3.81–5.12)
RBC MORPH BLD: PRESENT
SODIUM SERPL-SCNC: 135 MMOL/L (ref 136–145)
SODIUM SERPL-SCNC: 138 MMOL/L (ref 136–145)
T WAVE AXIS: -70 DEGREES
T WAVE AXIS: 247 DEGREES
TOTAL CELLS COUNTED SPEC: 100
TROPONIN I SERPL-MCNC: 0.07 NG/ML
TROPONIN I SERPL-MCNC: 0.33 NG/ML
VARIANT LYMPHS # BLD AUTO: 17 %
VENTRICULAR RATE: 101 BPM
VENTRICULAR RATE: 128 BPM
WBC # BLD AUTO: 10.77 THOUSAND/UL (ref 4.31–10.16)
WBC # BLD AUTO: 7.15 THOUSAND/UL (ref 4.31–10.16)
WBC TOXIC VACUOLES BLD QL SMEAR: PRESENT

## 2021-01-31 PROCEDURE — 93321 DOPPLER ECHO F-UP/LMTD STD: CPT | Performed by: INTERNAL MEDICINE

## 2021-01-31 PROCEDURE — 85007 BL SMEAR W/DIFF WBC COUNT: CPT | Performed by: NURSE PRACTITIONER

## 2021-01-31 PROCEDURE — 71045 X-RAY EXAM CHEST 1 VIEW: CPT

## 2021-01-31 PROCEDURE — 94760 N-INVAS EAR/PLS OXIMETRY 1: CPT

## 2021-01-31 PROCEDURE — 83605 ASSAY OF LACTIC ACID: CPT | Performed by: NURSE PRACTITIONER

## 2021-01-31 PROCEDURE — 84484 ASSAY OF TROPONIN QUANT: CPT | Performed by: NURSE PRACTITIONER

## 2021-01-31 PROCEDURE — 93010 ELECTROCARDIOGRAM REPORT: CPT | Performed by: INTERNAL MEDICINE

## 2021-01-31 PROCEDURE — 84100 ASSAY OF PHOSPHORUS: CPT | Performed by: NURSE PRACTITIONER

## 2021-01-31 PROCEDURE — 94002 VENT MGMT INPAT INIT DAY: CPT

## 2021-01-31 PROCEDURE — 99291 CRITICAL CARE FIRST HOUR: CPT | Performed by: STUDENT IN AN ORGANIZED HEALTH CARE EDUCATION/TRAINING PROGRAM

## 2021-01-31 PROCEDURE — 84145 PROCALCITONIN (PCT): CPT | Performed by: NURSE PRACTITIONER

## 2021-01-31 PROCEDURE — 93325 DOPPLER ECHO COLOR FLOW MAPG: CPT | Performed by: INTERNAL MEDICINE

## 2021-01-31 PROCEDURE — 85027 COMPLETE CBC AUTOMATED: CPT | Performed by: NURSE PRACTITIONER

## 2021-01-31 PROCEDURE — 83735 ASSAY OF MAGNESIUM: CPT | Performed by: NURSE PRACTITIONER

## 2021-01-31 PROCEDURE — 80053 COMPREHEN METABOLIC PANEL: CPT | Performed by: NURSE PRACTITIONER

## 2021-01-31 PROCEDURE — 80048 BASIC METABOLIC PNL TOTAL CA: CPT | Performed by: NURSE PRACTITIONER

## 2021-01-31 PROCEDURE — 82948 REAGENT STRIP/BLOOD GLUCOSE: CPT

## 2021-01-31 PROCEDURE — 93308 TTE F-UP OR LMTD: CPT | Performed by: INTERNAL MEDICINE

## 2021-01-31 PROCEDURE — 99232 SBSQ HOSP IP/OBS MODERATE 35: CPT | Performed by: PSYCHIATRY & NEUROLOGY

## 2021-01-31 PROCEDURE — 93005 ELECTROCARDIOGRAM TRACING: CPT

## 2021-01-31 PROCEDURE — 99233 SBSQ HOSP IP/OBS HIGH 50: CPT | Performed by: NURSE PRACTITIONER

## 2021-01-31 RX ORDER — FUROSEMIDE 10 MG/ML
40 INJECTION INTRAMUSCULAR; INTRAVENOUS ONCE
Status: COMPLETED | OUTPATIENT
Start: 2021-01-31 | End: 2021-01-31

## 2021-01-31 RX ORDER — HYDRALAZINE HYDROCHLORIDE 20 MG/ML
5 INJECTION INTRAMUSCULAR; INTRAVENOUS EVERY 6 HOURS PRN
Status: DISCONTINUED | OUTPATIENT
Start: 2021-01-31 | End: 2021-02-13 | Stop reason: HOSPADM

## 2021-01-31 RX ORDER — POTASSIUM CHLORIDE 14.9 MG/ML
20 INJECTION INTRAVENOUS ONCE
Status: COMPLETED | OUTPATIENT
Start: 2021-01-31 | End: 2021-01-31

## 2021-01-31 RX ORDER — MAGNESIUM SULFATE HEPTAHYDRATE 40 MG/ML
2 INJECTION, SOLUTION INTRAVENOUS ONCE
Status: COMPLETED | OUTPATIENT
Start: 2021-01-31 | End: 2021-01-31

## 2021-01-31 RX ORDER — FUROSEMIDE 40 MG/1
40 TABLET ORAL DAILY
Status: DISCONTINUED | OUTPATIENT
Start: 2021-01-31 | End: 2021-02-03

## 2021-01-31 RX ADMIN — CEFEPIME HYDROCHLORIDE 2000 MG: 2 INJECTION, POWDER, FOR SOLUTION INTRAVENOUS at 08:28

## 2021-01-31 RX ADMIN — FUROSEMIDE 40 MG: 10 INJECTION, SOLUTION INTRAMUSCULAR; INTRAVENOUS at 00:26

## 2021-01-31 RX ADMIN — MAGNESIUM SULFATE HEPTAHYDRATE 2 G: 40 INJECTION, SOLUTION INTRAVENOUS at 07:50

## 2021-01-31 RX ADMIN — Medication 250 MG: at 17:36

## 2021-01-31 RX ADMIN — CEFEPIME HYDROCHLORIDE 2000 MG: 2 INJECTION, POWDER, FOR SOLUTION INTRAVENOUS at 21:29

## 2021-01-31 RX ADMIN — Medication 250 MG: at 08:27

## 2021-01-31 RX ADMIN — Medication 1 PACKET: at 08:28

## 2021-01-31 RX ADMIN — LEVETIRACETAM 500 MG: 100 INJECTION, SOLUTION INTRAVENOUS at 20:30

## 2021-01-31 RX ADMIN — POTASSIUM CHLORIDE 20 MEQ: 14.9 INJECTION, SOLUTION INTRAVENOUS at 01:09

## 2021-01-31 RX ADMIN — METRONIDAZOLE 500 MG: 500 INJECTION, SOLUTION INTRAVENOUS at 22:15

## 2021-01-31 RX ADMIN — FUROSEMIDE 40 MG: 40 TABLET ORAL at 13:21

## 2021-01-31 RX ADMIN — METRONIDAZOLE 500 MG: 500 INJECTION, SOLUTION INTRAVENOUS at 04:23

## 2021-01-31 RX ADMIN — RIVAROXABAN 15 MG: 15 TABLET, FILM COATED ORAL at 16:53

## 2021-01-31 RX ADMIN — LEVETIRACETAM 500 MG: 100 INJECTION, SOLUTION INTRAVENOUS at 08:27

## 2021-01-31 RX ADMIN — METRONIDAZOLE 500 MG: 500 INJECTION, SOLUTION INTRAVENOUS at 12:15

## 2021-01-31 RX ADMIN — ATORVASTATIN CALCIUM 20 MG: 20 TABLET, FILM COATED ORAL at 16:53

## 2021-01-31 RX ADMIN — PRASUGREL HYDROCHLORIDE 10 MG: 10 TABLET, FILM COATED ORAL at 08:27

## 2021-01-31 NOTE — RESPIRATORY THERAPY NOTE
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)    A-fib (HCC)    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HCC)    Dysphagia    Hyponatremia    Anemia    Urinary retention    Hyperlipidemia    Toxic metabolic encephalopathy    Hypotension due to drugs      Home Pulmonary Medications:  na       Past Medical History:   Diagnosis Date    Anal fissure     Cardiac disorder     Cognitive changes 12/23/2020    Esophageal reflux     Esophagitis, reflux     Hemorrhoids     Hepatic hemangioma     Last Assessed: 1/13/2015    Herpes zoster     History of colonic polyps     Hypertension     Ischemic colitis ([facility] )     Lumbar herniated disc     Malignant neoplasm without specification of site ([facility] )     Nephrolithiasis     L   Lithotripsy    Nontoxic single thyroid nodule     Last Assessed: 1/13/2015    Osteoarthritis     Overactive bladder     Raynaud disease     Respiratory system disease     Sjogren's disease ([facility] )     Spinal stenosis     PONCHO (stress urinary incontinence, female)     Uterovaginal prolapse     Grade I-II     Social History     Socioeconomic History    Marital status: /Civil Union     Spouse name: Not on file    Number of children: Not on file    Years of education: Not on file    Highest education level: Not on file   Occupational History    Occupation: retired   Social Needs    Financial resource strain: Not on file    Food insecurity     Worry: Not on file     Inability: Not on file   [employer] needs     Medical: Not on file     Non-medical: Not on file   Tobacco Use    Smoking status: Never Smoker    Smokeless tobacco: Never Used   Substance and Sexual Activity    Alcohol use: Not Currently     Frequency: Monthly or less     Comment: social    Drug use: No    Sexual activity: Not Currently   Lifestyle    Physical activity     Days per week: Not on file     Minutes per session: Not on file    Stress: Not on file   Relationships    Social connections     Talks on phone: Not on file     Gets together: Not on file     Attends Christian service: Not on file     Active member of club or organization: Not on file     Attends meetings of clubs or organizations: Not on file     Relationship status: Not on file    Intimate partner violence     Fear of current or ex partner: Not on file     Emotionally abused: Not on file     Physically abused: Not on file     Forced sexual activity: Not on file   Other Topics Concern    Not on file   Social History Narrative    Activities: golf    Caffeine use    Consumes healthy diet    Daily caffeinated coffee consumption: 1 cup per day    Drinks caffeinated tea: 1 cup per day    Well balanced diet       Subjective         Objective    Physical Exam:   Assessment Type: Assess only  General Appearance: Awake  Respiratory Pattern: Dyspnea at rest, Tachypneic  Chest Assessment: Chest expansion symmetrical  Bilateral Breath Sounds: Diminished, Rales, Coarse  Cough: Non-productive  O2 Device: Bipap    Vitals:  Blood pressure 132/87, pulse 100, temperature 99 7 °F (37 6 °C), resp  rate (!) 26, weight 64 4 kg (141 lb 14 4 oz), SpO2 99 %, not currently breastfeeding  Results from last 7 days   Lab Units 01/30/21  0458   PH ART  7 477*   PCO2 ART mm Hg 39 9   PO2 ART mm Hg 157 5*   HCO3 ART mmol/L 28 8*   BASE EXC ART mmol/L 4 9   O2 CONTENT ART mL/dL 13 9*   O2 HGB, ARTERIAL % 98 4*   ABG SOURCE  Radial, Left   HOMER TEST  Yes       Imaging and other studies: I have personally reviewed pertinent reports  O2 Device: Bipap     Plan    Respiratory Plan: Vent/NIV/HFNC        Resp Comments: Pt  placed on Bipap due to increased WOB/Respiratory distress at this time     Pt's breatgh sounds are diminished and coarse with some rales bilaterally at this time  Pt  has no prior Pulmonary Hx  according to chart  No bronchodilators are indicated at this time  Will continue to assess and monitor pt  per Respiratory protocol

## 2021-01-31 NOTE — PROGRESS NOTES
Daily Progress Note - Critical Care   Mravin Delgado 79 y o  female MRN: [id number]  Unit/Bed#: [facility] 813-67 Encounter: [id number]        ----------------------------------------------------------------------------------------  HPI/24hr events:   · Mental Status improved  · Febrile 101 1  · Cefepime and Flagyl started  · Pt with episode of resp distress  hypoxemia, tachycardia and hypertensive (placed on BIPAP and 40mg lasix given, + response)    ---------------------------------------------------------------------------------------  SUBJECTIVE  Pt's breathing improved    Review of Systems   Constitutional: Positive for fatigue  HENT: Negative  Respiratory: Negative  Cardiovascular: Negative  Gastrointestinal: Negative  Endocrine: Negative  Genitourinary: Negative  Musculoskeletal: Negative  Allergic/Immunologic: Negative  Neurological: Negative  Hematological: Negative  Psychiatric/Behavioral: Negative  All other systems reviewed and are negative      Review of systems was reviewed and negative unless stated above in HPI/24-hour events   ---------------------------------------------------------------------------------------  Assessment and Plan:  Neuro:  Diagnosis: Encephalopathy (toxic metabolic) improved, seizure like activity  Plan:  · Management of PAD  · Delirium monitoring/management  · Regulate Sleep-wake cycle/CAM-ICU daily  · Serial Neuro Exams  · Neurology following  · Continue Keppra  · F/u EEG read  Cardiac  Diagnosis: Shock, elevated troponin, history of HTN and HLD, afib with tachy-jillian syndrome s/p PPM, afib (now nsr), combined systolic and diastolic HF, hx CAD (LAMINE to LM and LAD),  PAD (R SC artery chronically occluded, L SC artery with mild to moderate stenosis)   · Plan:  · Cardiac infusions: None  · MAP goal > 65, SBP <160  · Rhythm: A-Paced  · Follow rhythm on telemetry  · Trend troponin  · Continue Xarelto and Effient  · Continue home statin  Pulmonary  Diagnosis: Aspiration PNA, Acute Respiratory insufficiency  Plan:*  · Wean BIPAP off today  · Chlorhexidine ordered: no  · Pulmonary toileting  · Wean FiO2 as able  Gastrointestinal  Diagnosis: hx of GERD and hx of Ischemic Colitis  Plan:  · Continue psyllium and florastor daily  ·   FEN  Diagnosis: hypokalemia  Plan:  · Fluid/Diuretic plan: net negative  · Goal 24 hour fluid balance: PRN lasix dosing  · Nutrition/diet plan: Dysphagia PO diet  · Replete electrolytes with goals: K >4 0, Mag >2 0, and Phos >3 0  Genitourinary  Diagnosis: Urinary retention  Plan:  · Indwelling Saeed present: yes   · Trend UOP and BUN/creat  · Strict I and O  · Lasix 40 IV x 1 dose given overnight  Infectious Diease  Diagnosis: Possible Aspiration PNA  Plan:  · Abx ordered: Cefepime and MetronidazoleD #2  · Febrile overnight  · Trend temps and WBC count increased  Procalcitonin:  Results from last 7 days   Lab Units 01/30/21  1220   PROCALCITONIN ng/ml 0 07     ·   Heme:   Diagnosis: No acute issues  Plan:  · Trend hgb and plts  · Transfuse as needed for goal hgb >7  · Continue Xarelto  Endo:   Diagnosis:   Plan:   SSI alg #1   Adjust insulin regimen as needed to maintain goal -180  Lab Results   Component Value Date    HGBA1C 5 5 02/05/2019   ·      MSK/Skin:  · Early Mobility/Exercise  · PT consult: yes  · OT consult: yes  · Turn and position patient Q2 hours  · Off load pressure points  · Allevyn preventative per protocol  · *  Family:  · Family updated within 24 hours: yes     Disposition:Continue ICU level of care  Code Status: Level 1 - Full Code  ---------------------------------------------------------------------------------------  ICU CORE MEASURES    Prophylaxis   VTE Pharmacologic Prophylaxis: Rivaroxaban (Xarelto)  VTE Mechanical Prophylaxis: sequential compression device  Stress Ulcer Prophylaxis: Prophylaxis Not Indicated     ABCDE Protocol (if indicated)  Plan to perform spontaneous awakening trial today? Not applicable  Plan to perform spontaneous breathing trial today? Not applicable  Obvious barriers to extubation? Not applicable  CAM-ICU: Positive    Invasive Devices Review  Invasive Devices     Central Venous Catheter Line            CVC Central Lines 21 Triple Right Femoral 1 day          Drain            Urethral Catheter Other (Comment) 16 Fr  7 days              Can any invasive devices be discontinued today? No, consider PICC line on Monday as poor IV access and have femoral TLC in place  ---------------------------------------------------------------------------------------  OBJECTIVE    Vitals   Vitals:    21 0017 21 0040 21 0110 21 0255   BP: (!) 183/108 132/87 111/57    Pulse: (!) 128 100 90    Resp: (!) 34 (!) 26 (!) 25    Temp: 99 7 °F (37 6 °C) 99 7 °F (37 6 °C) 99 3 °F (37 4 °C)    TempSrc:       SpO2: 95% 99% 100% 100%   Weight:         Temp (24hrs), Av 8 °F (37 7 °C), Min:98 6 °F (37 °C), Max:101 1 °F (38 4 °C)  Current: Temperature: 99 3 °F (37 4 °C)  HR: 86  BP: 118/46 (65)  RR: 16  SpO2: 100% on BIPAP 12/6 60% FiO2 (-550)        Physical Exam  Constitutional:       Appearance: She is normal weight  Comments: Pt on BIPAP   Eyes:      General:         Right eye: No discharge  Left eye: No discharge  Pupils: Pupils are equal, round, and reactive to light  Cardiovascular:      Rate and Rhythm: Normal rate and regular rhythm  Pulses:           Radial pulses are 2+ on the right side and 2+ on the left side  Dorsalis pedis pulses are 1+ on the right side and 1+ on the left side  Heart sounds: Normal heart sounds  No murmur  No friction rub  No gallop  Pulmonary:      Effort: No accessory muscle usage or respiratory distress  Breath sounds: Examination of the right-lower field reveals decreased breath sounds  Examination of the left-lower field reveals decreased breath sounds   Decreased breath sounds and rhonchi present  No wheezing  Abdominal:      General: Abdomen is flat  Bowel sounds are normal       Palpations: Abdomen is soft  Comments: + BM   Genitourinary:     Comments: Saeed to gravity  Skin:     General: Skin is warm  Capillary Refill: Capillary refill takes less than 2 seconds  Nails: There is no clubbing  Neurological:      Mental Status: She is alert  Comments: Knows she is in hospital but states "[facility]"  Following commands  Non focal  UE 5/5  LE 4/5   Psychiatric:         Behavior: Behavior is cooperative  Laboratory and Diagnostics:  Results from last 7 days   Lab Units 01/31/21  0443 01/31/21  0027 01/30/21  0350 01/29/21  1746 01/29/21  1156 01/29/21  1146 01/29/21  0547 01/27/21  0545 01/26/21  1048  01/26/21  0551 01/25/21  1150 01/25/21  0620   WBC Thousand/uL 7 15 10 77* 4 42  --  5 19  --  4 92 4 44 4 51  --  5 03 6 05 5 46   HEMOGLOBIN g/dL 9 5* 11 0* 8 8*  --  10 1*  --  10 2* 9 2* 8 3*  --  7 9* 8 6* 8 0*   I STAT HEMOGLOBIN g/dl  --   --   --  8 5*  --  9 5*  --   --   --    < >  --   --   --    HEMATOCRIT % 29 6* 34 7* 27 8*  --  31 3*  --  32 1* 28 0* 26 8*  --  24 7* 26 7* 25 0*   HEMATOCRIT, ISTAT %  --   --   --  25*  --  28*  --   --   --    < >  --   --   --    PLATELETS Thousands/uL 235 325 216  --  251  --  260 230 244  --  243 245 219   NEUTROS PCT %  --   --  55  --   --   --  49 67 76*  --  67 69 71   BANDS PCT %  --  4  --   --   --   --   --   --   --   --   --   --   --    MONOS PCT %  --   --  10  --   --   --  14* 13* 10  --  14* 14* 13*   MONO PCT %  --  8  --   --   --   --   --   --   --   --   --   --   --     < > = values in this interval not displayed       Results from last 7 days   Lab Units 01/31/21  0605 01/31/21  0027 01/30/21  0350 01/29/21  1746 01/29/21  1156 01/29/21  1146 01/29/21  0547 01/27/21  0545  01/26/21  0551   SODIUM mmol/L 138 135* 136  --  133*  --  135* 135*  --  130*   POTASSIUM mmol/L 4 0 4 2 3 6  --  4 1  -- 3  5 3 7  --  3 8   CHLORIDE mmol/L 102 101 100  --  101  --  102 102  --  97*   CO2 mmol/L 30 29 30  --  29  --  25 29  --  28   CO2, I-STAT mmol/L  --   --   --  33*  --  30  --   --    < >  --    ANION GAP mmol/L 6 5 6  --  3*  --  8 4  --  5   BUN mg/dL 6 5 8  --  8  --  6 6  --  11   CREATININE mg/dL 0 65 0 81 0 70  --  0 83  --  0 80 0 58*  --  0 82   CALCIUM mg/dL 8 2* 8 7 7 9*  --  9 3  --  8 9 8 4  --  8 3   GLUCOSE RANDOM mg/dL 125 139 87  --  99  --  81 86  --  87   ALT U/L  --  27 21  --   --   --   --   --   --  23   AST U/L  --  28 20  --   --   --   --   --   --  21   ALK PHOS U/L  --  79 61  --   --   --   --   --   --  58   ALBUMIN g/dL  --  2 9* 2 4*  --   --   --   --   --   --  2 2*   TOTAL BILIRUBIN mg/dL  --  1 06* 0 91  --   --   --   --   --   --  1 12*    < > = values in this interval not displayed  Results from last 7 days   Lab Units 01/31/21  0605 01/31/21  0027 01/30/21  0350   MAGNESIUM mg/dL 1 8 2 0 2 0   PHOSPHORUS mg/dL 3 3 3 4 3 6      Results from last 7 days   Lab Units 01/29/21  1156   INR  2 89*   PTT seconds 56*      Results from last 7 days   Lab Units 01/31/21  0413 01/31/21  0027 01/29/21  1156 01/26/21  1049   TROPONIN I ng/mL 0 33* 0 07* 0 02 0 04     Results from last 7 days   Lab Units 01/31/21  0604 01/31/21  0027 01/26/21  1048   LACTIC ACID mmol/L 1 1 2 5* 1 2     ABG:  Results from last 7 days   Lab Units 01/30/21  0458   PH ART  7 477*   PCO2 ART mm Hg 39 9   PO2 ART mm Hg 157 5*   HCO3 ART mmol/L 28 8*   BASE EXC ART mmol/L 4 9   ABG SOURCE  Radial, Left     VBG:  Results from last 7 days   Lab Units 01/30/21  0458   ABG SOURCE  Radial, Left     Results from last 7 days   Lab Units 01/30/21  1220   PROCALCITONIN ng/ml 0 07       Micro  Results from last 7 days   Lab Units 01/30/21  1311 01/30/21  1155   BLOOD CULTURE  Received in Microbiology Lab  Culture in Progress  Received in Microbiology Lab  Culture in Progress         EKG: Paced on monitor this AM  Imaging: none this AM, CXR overnight  Images reviewed in PACS    Intake and Output  I/O       01/29 0701 - 01/30 0700 01/30 0701 - 01/31 0700    P  O  0 298    I V  (mL/kg) 1583 2 (24 6) 0 (0)    IV Piggyback 350 350    Total Intake(mL/kg) 1933 2 (30) 648 (10 1)    Urine (mL/kg/hr) 410 2260 (1 5)    Stool 0 0    Total Output 410 2260    Net +1523 2 -1612          Unmeasured Stool Occurrence 1 x 1 x        UOP:1 5 ml/kg/hour       Height and Weights      IBW: -92 5 kg  Body mass index is 29 66 kg/m²  Weight (last 2 days)     Date/Time   Weight    01/30/21 0600   64 4 (141 9)    01/29/21 1902   64 4 (141 9)                Nutrition       Diet Orders   (From admission, onward)             Start     Ordered    01/30/21 1614  Diet Cardiovascular; Cardiac; Dysphagia 1-Pureed; Nectar Thick Liquid  Diet effective now     Comments: Feed patient only when fully upright and alert   Question Answer Comment   Diet Type Cardiovascular    Cardiac Cardiac    Other Restriction(s): Dysphagia 1-Pureed    Liquid Modifier Nectar Thick Liquid    RD to adjust diet per protocol?  Yes        01/30/21 1613                  Active Medications  Scheduled Meds:  Current Facility-Administered Medications   Medication Dose Route Frequency Provider Last Rate    acetaminophen  650 mg Oral Q6H PRN Tomasa Mcknight PA-C      atorvastatin  20 mg Oral Daily With [employer], CARLY      cefepime  2,000 mg Intravenous Q12H Ángela Section, CRNP Stopped (01/31/21 0000)    insulin lispro  1-5 Units Subcutaneous Q6H [address] Beatriz Massey PA-C      levETIRAcetam  500 mg Intravenous Q12H [address] Tomasa Mcknight PA-C Stopped (01/30/21 2104)    metroNIDAZOLE  500 mg Intravenous Q8H Ángela Section, CRNP 500 mg (01/31/21 0423)    ondansetron  4 mg Intravenous Q6H PRN Tomasa Mcknight PA-C      oxyCODONE  2 5 mg Oral Q4H PRN Beatriz Massey PA-C      prasugrel  10 mg Oral Daily Beatriz Massey PA-C      psyllium  1 packet Oral Daily Marah Curry PA-C      rivaroxaban  15 mg Oral Daily With [facility] CARLY Massey      saccharomyces boulardii  250 mg Oral BID Marah Curry PA-C       Continuous Infusions:     PRN Meds:   acetaminophen, 650 mg, Q6H PRN  ondansetron, 4 mg, Q6H PRN  oxyCODONE, 2 5 mg, Q4H PRN        Allergies   Allergies   Allergen Reactions    Sulfa Antibiotics Anaphylaxis    Formaldehyde     Codeine Palpitations     ---------------------------------------------------------------------------------------  Advance Directive and Living Will:      Power of :    POLST:    ---------------------------------------------------------------------------------------  Care Time Delivered:   No Critical Care time spent     KATHY Villarreal      Portions of the record may have been created with voice recognition software  Occasional wrong word or "sound a like" substitutions may have occurred due to the inherent limitations of voice recognition software    Read the chart carefully and recognize, using context, where substitutions have occurred

## 2021-01-31 NOTE — PROGRESS NOTES
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 Plan    · Routine/spot  EEG ordered and pending  · Continue Keppra 500 mg b i d   · May consider obtaining MRI brain when able  · Continue seizure precautions  · Page Neurology for any abnormal/seziure like movements  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[facility])  Assessment & Plan  · Pacemaker placed  on 1/19/2021  · Management per primary team     A-fib [facility])  Assessment & Plan  · Recently diagnosed with a new onset AFib  · Continue Xarelto    Essential hypertension  Assessment & Plan  · Management per primary team      SUBJECTIVE     Patient was seen and examined  No acute events overnight  Overall Patient's mental status appears to have improved  Vitals significant for Febrile to 24 hour Tmax of 101 1, started on ABx per primary team     Pertinent Negatives include: numbness, weakness, speech or visual changes, headaches, migraine headaches, syncope, seizures, amaurosis, diplopia, other visual changes, paralysis/weakness, numbness or tingling, involuntary movements, tremor     Review of Systems   Constitutional: Negative for chills and fever  HENT: Negative for ear pain and sore throat  Eyes: Negative for pain and visual disturbance  Respiratory: Negative for cough and shortness of breath      Cardiovascular: Negative for chest pain and palpitations  Gastrointestinal: Negative for abdominal pain and vomiting  Genitourinary: Negative for dysuria and hematuria  Musculoskeletal: Negative for arthralgias and back pain  Skin: Negative for color change and rash  Neurological: Negative for dizziness, seizures, syncope, facial asymmetry, light-headedness, numbness and headaches  All other systems reviewed and are negative  OBJECTIVE     Patient ID: Marta Bello is a 79 y o  female  Vitals:    21 1100 21 1200 21 1218 21 1300   BP: 139/50 94/61 (!) 104/47    BP Location:  Right arm Left arm    Pulse: 86 88 92 98   Resp: 19 18 22 19   Temp: 99 3 °F (37 4 °C)      TempSrc:       SpO2: 100% 96% 95% 97%   Weight:          Temperature:   Temp (24hrs), Av 8 °F (37 7 °C), Min:99 °F (37 2 °C), Max:101 1 °F (38 4 °C)    Temperature: 99 3 °F (37 4 °C)      Physical Exam  Vitals signs and nursing note reviewed  Constitutional:       Appearance: She is well-developed  HENT:      Head: Normocephalic and atraumatic  Nose: Nose normal    Eyes:      Pupils: Pupils are equal, round, and reactive to light  Neck:      Musculoskeletal: Normal range of motion  Pulmonary:      Effort: Pulmonary effort is normal  No respiratory distress  Musculoskeletal:         General: No swelling, tenderness, deformity or signs of injury  Right lower leg: No edema  Left lower leg: No edema  Skin:     General: Skin is warm and dry  Neurological:      Mental Status: She is oriented to person, place, and time  Coordination: Finger-Nose-Finger Test and Heel to [facility] Test normal       Deep Tendon Reflexes: Strength normal    Psychiatric:         Speech: Speech normal           Neurologic Exam     Mental Status   Oriented to person, place, and time  Follows 2 step commands  Attention: normal    Speech: speech is normal   Level of consciousness: alert  Able to name object  Able to repeat     Patient with to to, year location  She was able to name correctly state her age  She was able to crawl to her date birth  She was able to correctly follow 2 step commands  There was no left confusion       Cranial Nerves   Cranial nerves II through XII intact  CN II   Visual fields full to confrontation  CN III, IV, VI   Pupils are equal, round, and reactive to light  Nystagmus: none   Diplopia: none  Conjugate gaze: present    CN V   Facial sensation intact  CN XI   CN XI normal      CN XII   CN XII normal    Tongue: not atrophic  Fasciculations: absent  Tongue deviation: none    Motor Exam   Muscle bulk: normal  Overall muscle tone: normal  Right arm pronator drift: absent  Left arm pronator drift: absent    Strength   Strength 5/5 throughout  Right deltoid: 5/5  Left deltoid: 5/5  Right biceps: 5/5  Left biceps: 5/5  Right triceps: 5/5  Left triceps: 5/5  Right hamstrin/5  Left hamstrin/5  Right peroneal: 5/5  Left peroneal: 5/5  Right gastroc: 5/5  Left gastroc: 5/5    Sensory Exam   Light touch normal      Gait, Coordination, and Reflexes     Coordination   Finger to nose coordination: normal  Heel to shin coordination: normal    Tremor   Resting tremor: absent  Intention tremor: absent  Action tremor: absent    Reflexes   Reflexes 2+ except as noted  Right plantar: normal  Left plantar: normal       LABORATORY DATA     Labs: I have personally reviewed pertinent reports      Results from last 7 days   Lab Units 21  0443 21  0027 21  0350  21  0547 21  0545   WBC Thousand/uL 7 15 10 77* 4 42   < > 4 92 4 44   HEMOGLOBIN g/dL 9 5* 11 0* 8 8*   < > 10 2* 9 2*   I STAT HEMOGLOBIN   --   --   --    < >  --   --    HEMATOCRIT % 29 6* 34 7* 27 8*   < > 32 1* 28 0*   HEMATOCRIT, ISTAT   --   --   --    < >  --   --    PLATELETS Thousands/uL 235 325 216   < > 260 230   NEUTROS PCT %  --   --  55  --  49 67   MONOS PCT %  --   --  10  --  14* 13*   MONO PCT %  --  8  --   --   --   --     < > = values in this interval not displayed  Results from last 7 days   Lab Units 01/31/21  0605 01/31/21  0027 01/30/21  0350 01/29/21  1746  01/29/21  1146  01/26/21  1028  01/26/21  0551   SODIUM mmol/L 138 135* 136  --    < >  --    < >  --   --  130*   POTASSIUM mmol/L 4 0 4 2 3 6  --    < >  --    < >  --   --  3 8   CHLORIDE mmol/L 102 101 100  --    < >  --    < >  --   --  97*   CO2 mmol/L 30 29 30  --    < >  --    < >  --   --  28   CO2, I-STAT mmol/L  --   --   --  33*  --  30  --  29   < >  --    BUN mg/dL 6 5 8  --    < >  --    < >  --   --  11   CREATININE mg/dL 0 65 0 81 0 70  --    < >  --    < >  --   --  0 82   CALCIUM mg/dL 8 2* 8 7 7 9*  --    < >  --    < >  --   --  8 3   ALK PHOS U/L  --  79 61  --   --   --   --   --   --  58   ALT U/L  --  27 21  --   --   --   --   --   --  23   AST U/L  --  28 20  --   --   --   --   --   --  21   GLUCOSE, ISTAT mg/dl  --   --   --  106  --  105  --  110  --   --     < > = values in this interval not displayed  Results from last 7 days   Lab Units 01/31/21  0605 01/31/21  0027 01/30/21  0350   MAGNESIUM mg/dL 1 8 2 0 2 0     Results from last 7 days   Lab Units 01/31/21  0605 01/31/21  0027 01/30/21  0350   PHOSPHORUS mg/dL 3 3 3 4 3 6      Results from last 7 days   Lab Units 01/29/21  1156   INR  2 89*   PTT seconds 56*     Results from last 7 days   Lab Units 01/31/21  0604   LACTIC ACID mmol/L 1 1     Results from last 7 days   Lab Units 01/31/21  0413 01/31/21  0027 01/29/21  1156   TROPONIN I ng/mL 0 33* 0 07* 0 02       IMAGING & DIAGNOSTIC TESTING     Radiology Results: I have personally reviewed pertinent reports  XR chest portable ICU   Final Result by Caleb Bui MD (01/31 1332)      Improving bilateral airspace opacities consistent with pulmonary edema or pneumonia  Bilateral loculated pleural effusions  No new abnormalities                    Workstation performed: [id number]         XR chest portable   Final Result by Alix Pollard Arie Gaucher, MD (01/31 5721)      New bilateral airspace opacities with pleural effusions and vascular congestion consistent with CHF and pulmonary edema  The study was marked in [facility] for immediate notification  Workstation performed: [id number]             Other Diagnostic Testing: I have personally reviewed pertinent reports  ACTIVE MEDICATIONS     Current Facility-Administered Medications   Medication Dose Route Frequency    acetaminophen (TYLENOL) tablet 650 mg  650 mg Oral Q6H PRN    atorvastatin (LIPITOR) tablet 20 mg  20 mg Oral Daily With Dinner    cefepime (MAXIPIME) 2,000 mg in dextrose 5 % 50 mL IVPB  2,000 mg Intravenous Q12H    furosemide (LASIX) tablet 40 mg  40 mg Oral Daily    hydrALAZINE (APRESOLINE) injection 5 mg  5 mg Intravenous Q6H PRN    insulin lispro (HumaLOG) 100 units/mL subcutaneous injection 1-5 Units  1-5 Units Subcutaneous Q6H SHELLEY    levETIRAcetam (KEPPRA) 500 mg in sodium chloride 0 9 % 100 mL IVPB  500 mg Intravenous Q12H SHELLEY    metroNIDAZOLE (FLAGYL) IVPB (premix) 500 mg 100 mL  500 mg Intravenous Q8H    ondansetron (ZOFRAN) injection 4 mg  4 mg Intravenous Q6H PRN    oxyCODONE (ROXICODONE) IR tablet 2 5 mg  2 5 mg Oral Q4H PRN    prasugrel (EFFIENT) tablet 10 mg  10 mg Oral Daily    psyllium (METAMUCIL) 1 packet  1 packet Oral Daily    rivaroxaban (XARELTO) tablet 15 mg  15 mg Oral Daily With Dinner    saccharomyces boulardii (FLORASTOR) capsule 250 mg  250 mg Oral BID       Prior to Admission medications    Medication Sig Start Date End Date Taking? Authorizing Provider   amLODIPine (NORVASC) 10 mg tablet Take 1 tablet (10 mg total) by mouth daily 12/24/20   Leandrodeanna Wallis PA-C   apixaban (ELIQUIS) 5 mg Take 1 tablet (5 mg total) by mouth 2 (two) times a day 1/18/21   Anjelica Lozano PA-C   aspirin (ECOTRIN LOW STRENGTH) 81 mg EC tablet Take 81 mg by mouth daily      Historical Provider, MD   atorvastatin (LIPITOR) 20 mg tablet Take 1 tablet (20 mg total) by mouth daily with dinner 12/5/20   Kyleigh Mahendra, DO   docusate sodium (Colace) 100 mg capsule Take 1 capsule (100 mg total) by mouth 2 (two) times a day 12/5/20   Kyleigh Mahendra, DO   furosemide (LASIX) 40 mg tablet Take 1 tablet (40 mg total) by mouth daily 12/6/20   Kyleigh Mahendra, DO   isosorbide mononitrate (IMDUR) 30 mg 24 hr tablet Take 3 tablets (90 mg total) by mouth daily 1/7/21   Marissa Ortiz MD   metoprolol succinate (TOPROL-XL) 50 mg 24 hr tablet Take 1 tablet (50 mg total) by mouth daily 1/7/21   Marissa Ortiz MD   multivitamin [facility]) TABS Take 1 tablet by mouth daily      Historical Provider, MD   nitroglycerin (NITROSTAT) 0 4 mg SL tablet PLACE 1 TABLET (0 4 MG TOTAL) UNDER THE TONGUE EVERY 5 (FIVE) MINUTES AS NEEDED FOR CHEST PAIN 1/6/21   Marissa Ortiz MD   pantoprazole (PROTONIX) 40 mg tablet Take 1 tablet (40 mg total) by mouth daily in the early morning 12/24/20   Zay Jefferson PA-C   polyethylene glycol (MIRALAX) 17 g packet Take 17 g by mouth daily 12/6/20   Kyleigh Mccray, DO   prasugrel (EFFIENT) tablet TAKE 1 TABLET EVERY OTHER DAY 12/27/20   Marissa Ortiz MD   ranolazine (RANEXA) 1000 MG SR tablet Take 1,000 mg by mouth 2 (two) times a day    Historical Provider, MD   rivaroxaban (XARELTO) 20 mg tablet Take 1 tablet (20 mg total) by mouth daily with breakfast 1/18/21   Anjelica Lozano PA-C   senna (SENOKOT) 8 6 mg Take 1 tablet (8 6 mg total) by mouth daily at bedtime 12/5/20   Kyleigh Mahendra, DO       VTE Mechanical Prophylaxis: sequential compression device    ==  MD [facility] Neu[facility] Residency, PGY-2

## 2021-01-31 NOTE — PROGRESS NOTES
Critical Care Services- Interval Progress Note   Concepcion Jalloh 79 y o  female MRN: [id number]  Unit/Bed#: [facility] 641-82 Encounter: [id number]  Assessment and Plan  I was called to bedside by the nursing staff secondary to patient's acute compensation with hypoxemia requiring escalating FiO2 requirements, tachycardia, hypertension and labored breathing  Patient had been lying flat and rolling from side to side in order to get cleaned up  Upon my arrival the patient's heart rate was 128 beats per minute, tachypneic with a respiratory rate in the upper 30s and on 6 L nasal cannula with sats of 35%, Systolic blood pressure was 190  Patient with rhonchorous breath sounds throughout  Patient given stat 40 mg Lasix IV x1, EKG showed LBBB inferior and lateral ST depressions, prior EKGs paced rhythm reviewed for comparison  Patient placed on BiPAP 12/6 with 100% FiO2 with significant improvement in tachycardia, hypertension and hypoxemia   Diagnosis:  Hypertensive urgency  o Plan:  Blood pressure improved to 132/87, EKG, check troponin     Diagnosis:  Acute hypoxemic respiratory failure  o Plan:  BiPAP over 12/6, 100% FiO2, wean FiO2 for sats greater than 92%     Diagnosis:  Flash pulmonary edema  o Plan:  Chest x-ray pending, 40 Lasix IV x1 dose given      Upon my evaluation, this patient had a high probability of imminent or life-threatening deterioration due to Acute hypoxemic respiratory failure, flash pulmonary edema, respiratory distress, which required my direct attention, intervention, and personal management  I have personally provided 24 minutes ([phone]) on 01/31/2021 of critical care time, exclusive of procedures, teaching, family meetings, and any prior time recorded by providers other than myself  Time includes review of laboratory data, review of imaging/radiology results, monitoring for potential decompensation  Interventions were performed as documented above  -------------------------------------------------------------------------------------------------------------------------------------  Hemodynamic Monitoring:  Vital Signs:   Vitals:    01/31/21 0017   BP: (!) 183/108   Pulse: (!) 128   Resp: (!) 34   Temp: 99 7 °F (37 6 °C)   SpO2: 95%       Laboratory   Results from last 7 days   Lab Units 01/31/21  0027 01/30/21  0350 01/29/21  1746 01/29/21  1156 01/29/21  1146 01/29/21  0547 01/27/21  0545 01/26/21  1048  01/26/21  0551 01/25/21  1150 01/25/21  0620   WBC Thousand/uL 10 77* 4 42  --  5 19  --  4 92 4 44 4 51  --  5 03 6 05 5 46   HEMOGLOBIN g/dL 11 0* 8 8*  --  10 1*  --  10 2* 9 2* 8 3*  --  7 9* 8 6* 8 0*   I STAT HEMOGLOBIN g/dl  --   --  8 5*  --  9 5*  --   --   --    < >  --   --   --    HEMATOCRIT % 34 7* 27 8*  --  31 3*  --  32 1* 28 0* 26 8*  --  24 7* 26 7* 25 0*   HEMATOCRIT, ISTAT %  --   --  25*  --  28*  --   --   --    < >  --   --   --    PLATELETS Thousands/uL 325 216  --  251  --  260 230 244  --  243 245 219   NEUTROS PCT %  --  55  --   --   --  49 67 76*  --  67 69 71   MONOS PCT %  --  10  --   --   --  14* 13* 10  --  14* 14* 13*    < > = values in this interval not displayed       Results from last 7 days   Lab Units 01/30/21  0350 01/29/21  1746 01/29/21  1156 01/29/21  1146 01/29/21  0547 01/27/21  0545 01/26/21  1028 01/26/21  0551 01/25/21  0620 01/24/21  0501   SODIUM mmol/L 136  --  133*  --  135* 135*  --  130* 132* 131*   POTASSIUM mmol/L 3 6  --  4 1  --  3 5 3 7  --  3 8 3 3* 3 6   CHLORIDE mmol/L 100  --  101  --  102 102  --  97* 97* 97*   CO2 mmol/L 30  --  29  --  25 29  --  28 30 28   CO2, I-STAT mmol/L  --  33*  --  30  --   --  29  --   --   --    ANION GAP mmol/L 6  --  3*  --  8 4  --  5 5 6   BUN mg/dL 8  --  8  --  6 6  --  11 6 6   CREATININE mg/dL 0 70  --  0 83  --  0 80 0 58*  --  0 82 0 59* 0 57*   CALCIUM mg/dL 7 9*  --  9 3  --  8 9 8 4  --  8 3 8 4 7 8*   GLUCOSE RANDOM mg/dL 87  --  99  --  81 86  --  87 94 89   ALT U/L 21  --   --   --   --   --   --  23  --   --    AST U/L 20  --   --   --   --   --   --  21  --   --    ALK PHOS U/L 61  --   --   --   --   --   --  58  --   --    ALBUMIN g/dL 2 4*  --   --   --   --   --   --  2 2*  --   --    TOTAL BILIRUBIN mg/dL 0 91  --   --   --   --   --   --  1 12*  --   --      Results from last 7 days   Lab Units 01/30/21  0350   MAGNESIUM mg/dL 2 0   PHOSPHORUS mg/dL 3 6      Results from last 7 days   Lab Units 01/29/21  1156   INR  2 89*   PTT seconds 56*      Results from last 7 days   Lab Units 01/29/21  1156 01/26/21  1049   TROPONIN I ng/mL 0 02 0 04     Results from last 7 days   Lab Units 01/26/21  1048   LACTIC ACID mmol/L 1 2     ABG:  Results from last 7 days   Lab Units 01/30/21  0458   PH ART  7 477*   PCO2 ART mm Hg 39 9   PO2 ART mm Hg 157 5*   HCO3 ART mmol/L 28 8*   BASE EXC ART mmol/L 4 9   ABG SOURCE  Radial, Left     VBG:  Results from last 7 days   Lab Units 01/30/21  0458   ABG SOURCE  Radial, Left     Results from last 7 days   Lab Units 01/30/21  1220   PROCALCITONIN ng/ml 0 07       Micro  Results from last 7 days   Lab Units 01/30/21  1311 01/30/21  1155   BLOOD CULTURE  Received in Microbiology Lab  Culture in Progress  Received in Microbiology Lab  Culture in Progress         Diagnostic Imaging / Data: CXR pending    Medications:  Current Facility-Administered Medications   Medication Dose Route Frequency    acetaminophen (TYLENOL) tablet 650 mg  650 mg Oral Q6H PRN    atorvastatin (LIPITOR) tablet 20 mg  20 mg Oral Daily With Dinner    cefepime (MAXIPIME) 2,000 mg in dextrose 5 % 50 mL IVPB  2,000 mg Intravenous Q12H    insulin lispro (HumaLOG) 100 units/mL subcutaneous injection 1-5 Units  1-5 Units Subcutaneous Q6H [address]    levETIRAcetam (KEPPRA) 500 mg in sodium chloride 0 9 % 100 mL IVPB  500 mg Intravenous Q12H SHELLEY    metroNIDAZOLE (FLAGYL) IVPB (premix) 500 mg 100 mL  500 mg Intravenous Q8H    ondansetron (ZOFRAN) injection 4 mg  4 mg Intravenous Q6H PRN    oxyCODONE (ROXICODONE) IR tablet 2 5 mg  2 5 mg Oral Q4H PRN    prasugrel (EFFIENT) tablet 10 mg  10 mg Oral Daily    psyllium (METAMUCIL) 1 packet  1 packet Oral Daily    rivaroxaban (XARELTO) tablet 15 mg  15 mg Oral Daily With Dinner    saccharomyces boulardii (FLORASTOR) capsule 250 mg  250 mg Oral BID       SIGNATURE: KATHY Watts    Portions of the record may have been created with voice recognition software  Occasional wrong word or "sound a like" substitutions may have occurred due to the inherent limitations of voice recognition software    Read the chart carefully and recognize, using context, where substitutions have occurred

## 2021-02-01 ENCOUNTER — APPOINTMENT (INPATIENT)
Dept: NEUROLOGY | Facility: CLINIC | Age: 81
DRG: 100 | End: 2021-02-01
Payer: MEDICARE

## 2021-02-01 PROBLEM — J69.0 ASPIRATION PNEUMONIA (HCC): Status: ACTIVE | Noted: 2021-02-01

## 2021-02-01 PROBLEM — E87.1 HYPONATREMIA: Status: RESOLVED | Noted: 2021-01-21 | Resolved: 2021-02-01

## 2021-02-01 PROBLEM — I95.2 HYPOTENSION DUE TO DRUGS: Status: RESOLVED | Noted: 2021-01-29 | Resolved: 2021-02-01

## 2021-02-01 PROBLEM — D64.9 ANEMIA: Status: RESOLVED | Noted: 2021-01-21 | Resolved: 2021-02-01

## 2021-02-01 LAB
ANION GAP SERPL CALCULATED.3IONS-SCNC: 5 MMOL/L (ref 4–13)
BUN SERPL-MCNC: 8 MG/DL (ref 5–25)
CALCIUM SERPL-MCNC: 8.2 MG/DL (ref 8.3–10.1)
CHLORIDE SERPL-SCNC: 104 MMOL/L (ref 100–108)
CO2 SERPL-SCNC: 31 MMOL/L (ref 21–32)
CREAT SERPL-MCNC: 0.59 MG/DL (ref 0.6–1.3)
ERYTHROCYTE [DISTWIDTH] IN BLOOD BY AUTOMATED COUNT: 14.9 % (ref 11.6–15.1)
GFR SERPL CREATININE-BSD FRML MDRD: 86 ML/MIN/1.73SQ M
GLUCOSE SERPL-MCNC: 100 MG/DL (ref 65–140)
GLUCOSE SERPL-MCNC: 103 MG/DL (ref 65–140)
GLUCOSE SERPL-MCNC: 109 MG/DL (ref 65–140)
GLUCOSE SERPL-MCNC: 110 MG/DL (ref 65–140)
GLUCOSE SERPL-MCNC: 119 MG/DL (ref 65–140)
GLUCOSE SERPL-MCNC: 157 MG/DL (ref 65–140)
HCT VFR BLD AUTO: 29.1 % (ref 34.8–46.1)
HGB BLD-MCNC: 9.1 G/DL (ref 11.5–15.4)
MAGNESIUM SERPL-MCNC: 2 MG/DL (ref 1.6–2.6)
MCH RBC QN AUTO: 29.8 PG (ref 26.8–34.3)
MCHC RBC AUTO-ENTMCNC: 31.3 G/DL (ref 31.4–37.4)
MCV RBC AUTO: 95 FL (ref 82–98)
PHOSPHATE SERPL-MCNC: 2.5 MG/DL (ref 2.3–4.1)
PLATELET # BLD AUTO: 236 THOUSANDS/UL (ref 149–390)
PMV BLD AUTO: 10.1 FL (ref 8.9–12.7)
POTASSIUM SERPL-SCNC: 3.6 MMOL/L (ref 3.5–5.3)
PROCALCITONIN SERPL-MCNC: 0.07 NG/ML
PROCALCITONIN SERPL-MCNC: 0.12 NG/ML
RBC # BLD AUTO: 3.05 MILLION/UL (ref 3.81–5.12)
SODIUM SERPL-SCNC: 140 MMOL/L (ref 136–145)
TROPONIN I SERPL-MCNC: 0.14 NG/ML
WBC # BLD AUTO: 6.13 THOUSAND/UL (ref 4.31–10.16)

## 2021-02-01 PROCEDURE — 97163 PT EVAL HIGH COMPLEX 45 MIN: CPT

## 2021-02-01 PROCEDURE — 95816 EEG AWAKE AND DROWSY: CPT

## 2021-02-01 PROCEDURE — 82948 REAGENT STRIP/BLOOD GLUCOSE: CPT

## 2021-02-01 PROCEDURE — 84100 ASSAY OF PHOSPHORUS: CPT | Performed by: NURSE PRACTITIONER

## 2021-02-01 PROCEDURE — 95816 EEG AWAKE AND DROWSY: CPT | Performed by: PSYCHIATRY & NEUROLOGY

## 2021-02-01 PROCEDURE — NC001 PR NO CHARGE: Performed by: PHYSICIAN ASSISTANT

## 2021-02-01 PROCEDURE — 84145 PROCALCITONIN (PCT): CPT | Performed by: NURSE PRACTITIONER

## 2021-02-01 PROCEDURE — 83735 ASSAY OF MAGNESIUM: CPT | Performed by: NURSE PRACTITIONER

## 2021-02-01 PROCEDURE — 85027 COMPLETE CBC AUTOMATED: CPT | Performed by: NURSE PRACTITIONER

## 2021-02-01 PROCEDURE — 99233 SBSQ HOSP IP/OBS HIGH 50: CPT | Performed by: ANESTHESIOLOGY

## 2021-02-01 PROCEDURE — 84484 ASSAY OF TROPONIN QUANT: CPT | Performed by: NURSE PRACTITIONER

## 2021-02-01 PROCEDURE — 80048 BASIC METABOLIC PNL TOTAL CA: CPT | Performed by: NURSE PRACTITIONER

## 2021-02-01 PROCEDURE — 99232 SBSQ HOSP IP/OBS MODERATE 35: CPT | Performed by: PSYCHIATRY & NEUROLOGY

## 2021-02-01 RX ORDER — LEVETIRACETAM 500 MG/1
500 TABLET ORAL EVERY 12 HOURS SCHEDULED
Status: DISCONTINUED | OUTPATIENT
Start: 2021-02-01 | End: 2021-02-13 | Stop reason: HOSPADM

## 2021-02-01 RX ORDER — LIDOCAINE HYDROCHLORIDE 10 MG/ML
5 INJECTION, SOLUTION EPIDURAL; INFILTRATION; INTRACAUDAL; PERINEURAL ONCE
Status: DISCONTINUED | OUTPATIENT
Start: 2021-02-01 | End: 2021-02-13 | Stop reason: HOSPADM

## 2021-02-01 RX ORDER — CEFDINIR 300 MG/1
300 CAPSULE ORAL EVERY 12 HOURS SCHEDULED
Status: DISPENSED | OUTPATIENT
Start: 2021-02-01 | End: 2021-02-04

## 2021-02-01 RX ORDER — LIDOCAINE HYDROCHLORIDE 10 MG/ML
INJECTION, SOLUTION EPIDURAL; INFILTRATION; INTRACAUDAL; PERINEURAL
Status: DISPENSED
Start: 2021-02-01 | End: 2021-02-01

## 2021-02-01 RX ORDER — METRONIDAZOLE 500 MG/1
500 TABLET ORAL EVERY 8 HOURS SCHEDULED
Status: COMPLETED | OUTPATIENT
Start: 2021-02-01 | End: 2021-02-04

## 2021-02-01 RX ADMIN — CEFEPIME HYDROCHLORIDE 2000 MG: 2 INJECTION, POWDER, FOR SOLUTION INTRAVENOUS at 08:11

## 2021-02-01 RX ADMIN — FUROSEMIDE 40 MG: 40 TABLET ORAL at 08:12

## 2021-02-01 RX ADMIN — METRONIDAZOLE 500 MG: 500 INJECTION, SOLUTION INTRAVENOUS at 04:25

## 2021-02-01 RX ADMIN — METRONIDAZOLE 500 MG: 500 TABLET ORAL at 13:50

## 2021-02-01 RX ADMIN — Medication 250 MG: at 08:12

## 2021-02-01 RX ADMIN — PRASUGREL HYDROCHLORIDE 10 MG: 10 TABLET, FILM COATED ORAL at 08:12

## 2021-02-01 RX ADMIN — CEFDINIR 300 MG: 300 CAPSULE ORAL at 17:42

## 2021-02-01 RX ADMIN — METRONIDAZOLE 500 MG: 500 TABLET ORAL at 21:46

## 2021-02-01 RX ADMIN — ATORVASTATIN CALCIUM 20 MG: 20 TABLET, FILM COATED ORAL at 17:42

## 2021-02-01 RX ADMIN — RIVAROXABAN 15 MG: 15 TABLET, FILM COATED ORAL at 17:42

## 2021-02-01 RX ADMIN — Medication 250 MG: at 17:42

## 2021-02-01 RX ADMIN — INSULIN LISPRO 1 UNITS: 100 INJECTION, SOLUTION INTRAVENOUS; SUBCUTANEOUS at 11:19

## 2021-02-01 RX ADMIN — LEVETIRACETAM 500 MG: 500 TABLET, FILM COATED ORAL at 21:46

## 2021-02-01 RX ADMIN — LEVETIRACETAM 500 MG: 100 INJECTION, SOLUTION INTRAVENOUS at 08:11

## 2021-02-01 NOTE — PROGRESS NOTES
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, recent PE, recent pneumonia treated with antibiotics she was  was a stroke alert on 1/29/2021 for AMS was noted to have a right gaze pref  Patient was found to be obtunded after receiving ativan and keppra and was upgraded to icu  · Impression- Patient has had 2 episodes of AMS with generalized shaking, right gaze preference,  unresponsiveness  Unclear cause of her seizure-like episodes  · Plan    · Routine/spot  EEG ordered and pending  · Continue Keppra 500 mg b i d  · Would like to to obtain  MRI brain when able  · Continue seizure precautions  · Page Neurology for any abnormal/seziure like movements    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[facility])  Assessment & Plan  · Pacemaker placed  on 1/19/2021  · Management per primary team     A-fib [facility])  Assessment & Plan  · Recently diagnosed with a new onset AFib  · Continue Xarelto    Essential hypertension  Assessment & Plan  · Management per primary team      Recommendations for outpatient neurological follow up have yet to be determined  SUBJECTIVE     Patient was seen and examined  No acute events overnight  Patient states that she was sleeping well overnight  She had no complaints my exam today    She asking why she is in the hospital   She was noted to be comfortably sitting in her chair eating her breakfast     Pertinent Negatives include: numbness, weakness, speech or visual changes, headaches, migraine headaches, syncope, seizures, amaurosis, diplopia, other visual changes, paralysis/weakness, numbness or tingling, involuntary movements, tremor     Review of Systems Constitutional: Negative  Negative for activity change, appetite change, chills and fever  HENT: Negative  Negative for ear pain, facial swelling, hearing loss, sore throat, trouble swallowing and voice change  Eyes: Negative  Negative for pain, discharge, itching and visual disturbance  Respiratory: Negative  Negative for cough, chest tightness and shortness of breath  Cardiovascular: Negative for chest pain and palpitations  Gastrointestinal: Negative for abdominal pain and vomiting  Genitourinary: Negative for dysuria and hematuria  Musculoskeletal: Negative for arthralgias and back pain  Skin: Negative for color change and rash  Neurological: Negative  Negative for dizziness, tremors, seizures, syncope, facial asymmetry, speech difficulty, weakness, light-headedness, numbness and headaches  All other systems reviewed and are negative  OBJECTIVE     Patient ID: Karissa Martínez is a 79 y o  female  Vitals:    21 0800 21 0900 21 1000 21 1100   BP: 169/78 146/59 156/87 149/60   Pulse: 86 90 96 84   Resp: 18 18 21    Temp: 98 6 °F (37 °C) (!) 97 2 °F (36 2 °C) (!) 96 8 °F (36 °C)    TempSrc:       SpO2: 98% 94% 99% 100%   Weight:          Temperature:   Temp (24hrs), Av 6 °F (37 °C), Min:96 8 °F (36 °C), Max:99 7 °F (37 6 °C)    Temperature: (!) 96 8 °F (36 °C)      Physical Exam  Vitals signs and nursing note reviewed  Constitutional:       Appearance: Normal appearance  She is well-developed  HENT:      Head: Normocephalic and atraumatic  Nose: Nose normal    Eyes:      General: No scleral icterus  Right eye: No discharge  Left eye: No discharge  Extraocular Movements: Extraocular movements intact  Pupils: Pupils are equal, round, and reactive to light  Neck:      Musculoskeletal: Normal range of motion  No neck rigidity  Pulmonary:      Effort: No respiratory distress  Breath sounds: Normal breath sounds  Abdominal:      General: There is no distension  Tenderness: There is no abdominal tenderness  Musculoskeletal: Normal range of motion  General: No tenderness or deformity  Skin:     General: Skin is warm and dry  Neurological:      Mental Status: She is alert  Neurologic Exam     Cranial Nerves     CN III, IV, VI   Pupils are equal, round, and reactive to light  LABORATORY DATA     Labs: I have personally reviewed pertinent reports  Results from last 7 days   Lab Units 02/01/21 0418 01/31/21  0443 01/31/21  0027 01/30/21  0350  01/29/21  0547 01/27/21  0545   WBC Thousand/uL 6 13 7 15 10 77* 4 42   < > 4 92 4 44   HEMOGLOBIN g/dL 9 1* 9 5* 11 0* 8 8*   < > 10 2* 9 2*   I STAT HEMOGLOBIN   --   --   --   --    < >  --   --    HEMATOCRIT % 29 1* 29 6* 34 7* 27 8*   < > 32 1* 28 0*   HEMATOCRIT, ISTAT   --   --   --   --    < >  --   --    PLATELETS Thousands/uL 236 235 325 216   < > 260 230   NEUTROS PCT %  --   --   --  55  --  49 67   MONOS PCT %  --   --   --  10  --  14* 13*   MONO PCT %  --   --  8  --   --   --   --     < > = values in this interval not displayed        Results from last 7 days   Lab Units 02/01/21 0418 01/31/21  0605 01/31/21  0027 01/30/21  0350 01/29/21  1746  01/29/21  1146  01/26/21  1028  01/26/21  0551   SODIUM mmol/L 140 138 135* 136  --    < >  --    < >  --   --  130*   POTASSIUM mmol/L 3 6 4 0 4 2 3 6  --    < >  --    < >  --   --  3 8   CHLORIDE mmol/L 104 102 101 100  --    < >  --    < >  --   --  97*   CO2 mmol/L 31 30 29 30  --    < >  --    < >  --   --  28   CO2, I-STAT mmol/L  --   --   --   --  33*  --  30  --  29   < >  --    BUN mg/dL 8 6 5 8  --    < >  --    < >  --   --  11   CREATININE mg/dL 0 59* 0 65 0 81 0 70  --    < >  --    < >  --   --  0 82   CALCIUM mg/dL 8 2* 8 2* 8 7 7 9*  --    < >  --    < >  --   --  8 3   ALK PHOS U/L  --   --  79 61  --   --   --   --   --   --  58   ALT U/L  --   --  27 21  --   --   --   -- --   --  23   AST U/L  --   --  28 20  --   --   --   --   --   --  21   GLUCOSE, ISTAT mg/dl  --   --   --   --  106  --  105  --  110  --   --     < > = values in this interval not displayed  Results from last 7 days   Lab Units 02/01/21  0418 01/31/21  0605 01/31/21  0027   MAGNESIUM mg/dL 2 0 1 8 2 0     Results from last 7 days   Lab Units 02/01/21  0418 01/31/21  0605 01/31/21  0027   PHOSPHORUS mg/dL 2 5 3 3 3 4      Results from last 7 days   Lab Units 01/29/21  1156   INR  2 89*   PTT seconds 56*     Results from last 7 days   Lab Units 01/31/21  0604   LACTIC ACID mmol/L 1 1     Results from last 7 days   Lab Units 02/01/21  0419 01/31/21  0413 01/31/21  0027   TROPONIN I ng/mL 0 14* 0 33* 0 07*       IMAGING & DIAGNOSTIC TESTING     Radiology Results: I have personally reviewed pertinent reports  XR chest portable ICU   Final Result by Lux Reyes MD (01/31 1332)      Improving bilateral airspace opacities consistent with pulmonary edema or pneumonia  Bilateral loculated pleural effusions  No new abnormalities  Workstation performed: [id number]         XR chest portable   Final Result by Lux Reyes MD (01/31 1336)      New bilateral airspace opacities with pleural effusions and vascular congestion consistent with CHF and pulmonary edema  The study was marked in [facility] for immediate notification  Workstation performed: [id number]             Other Diagnostic Testing: I have personally reviewed pertinent reports        ACTIVE MEDICATIONS     Current Facility-Administered Medications   Medication Dose Route Frequency    acetaminophen (TYLENOL) tablet 650 mg  650 mg Oral Q6H PRN    atorvastatin (LIPITOR) tablet 20 mg  20 mg Oral Daily With Dinner    cefdinir (OMNICEF) capsule 300 mg  300 mg Oral Q12H [address]    furosemide (LASIX) tablet 40 mg  40 mg Oral Daily    hydrALAZINE (APRESOLINE) injection 5 mg  5 mg Intravenous Q6H PRN    insulin lispro (HumaLOG) 100 units/mL subcutaneous injection 1-5 Units  1-5 Units Subcutaneous Q6H [facility]    levETIRAcetam (KEPPRA) tablet 500 mg  500 mg Oral Q12H [place]    lidocaine (PF) (XYLOCAINE-MPF) 1 % injection 5 mL  5 mL Infiltration Once    metroNIDAZOLE (FLAGYL) tablet 500 mg  500 mg Oral Q8H [facility]    ondansetron (ZOFRAN) injection 4 mg  4 mg Intravenous Q6H PRN    oxyCODONE (ROXICODONE) IR tablet 2 5 mg  2 5 mg Oral Q4H PRN    prasugrel (EFFIENT) tablet 10 mg  10 mg Oral Daily    psyllium (METAMUCIL) 1 packet  1 packet Oral Daily    rivaroxaban (XARELTO) tablet 15 mg  15 mg Oral Daily With Dinner    saccharomyces boulardii (FLORASTOR) capsule 250 mg  250 mg Oral BID       Prior to Admission medications    Medication Sig Start Date End Date Taking? Authorizing Provider   amLODIPine (NORVASC) 10 mg tablet Take 1 tablet (10 mg total) by mouth daily 12/24/20   Betina Villanueva PA-C   apixaban (ELIQUIS) 5 mg Take 1 tablet (5 mg total) by mouth 2 (two) times a day 1/18/21   Anjelica Lozano PA-C   aspirin (ECOTRIN LOW STRENGTH) 81 mg EC tablet Take 81 mg by mouth daily  Historical Provider, MD   atorvastatin (LIPITOR) 20 mg tablet Take 1 tablet (20 mg total) by mouth daily with dinner 12/5/20   Maria Isabel Turcios, DO   docusate sodium (Colace) 100 mg capsule Take 1 capsule (100 mg total) by mouth 2 (two) times a day 12/5/20   Maria Isabel Turcios, DO   furosemide (LASIX) 40 mg tablet Take 1 tablet (40 mg total) by mouth daily 12/6/20   Maria Isabel Turcios, DO   isosorbide mononitrate (IMDUR) 30 mg 24 hr tablet Take 3 tablets (90 mg total) by mouth daily 1/7/21   Kat Kidd MD   metoprolol succinate (TOPROL-XL) 50 mg 24 hr tablet Take 1 tablet (50 mg total) by mouth daily 1/7/21   Kat Kidd MD   multivitamin [facility]) TABS Take 1 tablet by mouth daily      Historical Provider, MD   nitroglycerin (NITROSTAT) 0 4 mg SL tablet PLACE 1 TABLET (0 4 MG TOTAL) UNDER THE TONGUE EVERY 5 (FIVE) MINUTES AS NEEDED FOR CHEST PAIN 1/6/21   Lashae Prasad Leah Thibodeaux MD   pantoprazole (PROTONIX) 40 mg tablet Take 1 tablet (40 mg total) by mouth daily in the early morning 12/24/20   Christiano Lambert PA-C   polyethylene glycol (MIRALAX) 17 g packet Take 17 g by mouth daily 12/6/20   Honorio Todd DO   prasugrel (EFFIENT) tablet TAKE 1 TABLET EVERY OTHER DAY 12/27/20   Loraine Rhoades MD   ranolazine (RANEXA) 1000 MG SR tablet Take 1,000 mg by mouth 2 (two) times a day    Historical Provider, MD   rivaroxaban (XARELTO) 20 mg tablet Take 1 tablet (20 mg total) by mouth daily with breakfast 1/18/21   Anjelica Lozano PA-C   senna (SENOKOT) 8 6 mg Take 1 tablet (8 6 mg total) by mouth daily at bedtime 12/5/20   Honorio Todd DO         VTE Pharmacologic Prophylaxis: Rivaroxaban (Xarelto)  VTE Mechanical Prophylaxis: sequential compression device    ==  MD Zack Packer [place], PGY-2

## 2021-02-01 NOTE — PROGRESS NOTES
ICU Transfer Note - Concepcion Jalloh 1940, 79 y o  female MRN: [id number]    Unit/Bed#: [facility] 516-01 Encounter: [id number]    Primary Care Provider: Alexy Phipps MD   Date and time admitted to hospital: 1/29/2021  3:37 PM        * Seizure-like activity [facility])  Assessment & Plan    · Unable to perform MRI 2/2 recent LAMINE and PPM placement  · EEG negative  · Keppra 500 b i d    · Appreciate neurology input      Toxic metabolic encephalopathy  Assessment & Plan  Likely related to a combination seizure like activity combined with postictal state  Monitor mental status, GCS 14 pleasantly demented, per family this is her baseline      Hypotension due to drugsresolved as of 2/1/2021  Assessment & Plan  Pt with hypotension after ativan administration    Aspiration pneumonia ([facility] [address] )  Assessment & Plan  As exhibited by hypotension, hypoxemia, sputum, leukocytosis   Culture data negative, procal negative x2  Cefdinir/Flagyl day 3/5    Hyperlipidemia  Assessment & Plan  Continue statin    Urinary retention  Assessment & Plan  Pt required dominguez catheter placement  Bowel regimen  Voiding trial       Dysphagia  Assessment & Plan  Dysphagia level 3 nectar thick liquids    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HCC)  Assessment & Plan  · Pacemaker placed  on 1/19/2021    A-fib [facility])  Assessment & Plan  Patient with new onset atrial fibrillation  Anticoagulated with Xarelto  Rate controlled    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)  Assessment & Plan  Wt Readings from Last 3 Encounters:   02/01/21 64 5 kg (142 lb 3 2 oz)   01/27/21 70 kg (154 lb 4 8 oz)   01/21/21 63 7 kg (140 lb 6 4 oz)     grade 1 DD  - EF 45%  -daily lasix            Essential hypertension  Assessment & Plan  Hold antihypertensives in light of hypotension  PRN hydralazine      Anemiaresolved as of 2/1/2021  Assessment & Plan  Hemoglobin acceptable at 10 1      Hyponatremiaresolved as of 2/1/2021  Assessment & Plan  Chronic   Unlikely to be contributing to her condition      Code Status: Level 1 - Full Code  POA:    POLST:      Reason for ICU admission:   Hypotension, seizure like activity    Active problems:   Principal Problem:    Seizure-like activity ([facility] [address] )  Active Problems:    Toxic metabolic encephalopathy    Essential hypertension    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[facility] [address] )    A-fib (HCC)    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HCC)    Dysphagia    Urinary retention    Hyperlipidemia    Aspiration pneumonia (HCC)  Resolved Problems:    Hypotension due to drugs    Hyponatremia    Anemia      Consultants:   Neurology  Cardiology     History of Present Illness:   79-year-old female with past medical history of Sjogren's disease, hypertension, hyperlipidemia who now presents with new onset seizures and aspiration pneumonia  Patient initially presented to [facility] on 01/07 with complaints of chest pain  There she underwent left heart catheterization and found to have multivessel CAD had 2 stents placed in OM1 and LAD  There she also developed aspiration pneumonia for which she completed a course, she subsequently developed AFib with some components of sinus pause and was transferred to [place] for electrophysiology eval   On 01/19 patient underwent dual chamber pacemaker placement and was subsequently discharged to [facility]  On 01/29 patient was rapid response secondary to seizure-like activity and hypotension  Stroke workup with negative  Patient was placed on pressors had central line placed and started on broad-spectrum antibiotics for presumed aspiration pneumonia  She has since been weaned off of pressors transition to oral antibiotics  Patient had negative EEG, Neurology is following  Re-evaluated by speech tube pain dysphagia diet  The patient is now hemodynamically stable awake alert oriented to self which is her baseline, she is appropriate for transfer to [facility] floor      Recent or scheduled procedures:   1/19 PPM    Outstanding/pending diagnostics:   NA    Cultures:   NA       Mobilization Plan:   PT/OT    Nutrition Plan:   Dysphagia level 3, nectar thick     Invasive Devices Review  Invasive Devices     Central Venous Catheter Line            CVC Central Lines 01/29/21 Triple Right Femoral 2 days          Drain            Urethral Catheter Other (Comment) 16 Fr  8 days                Rationale for remaining devices: dominguez for retention     VTE Pharmacologic Prophylaxis: Rivaroxaban (Xarelto)  VTE Mechanical Prophylaxis: sequential compression device    Discharge Plan:   Patient should be ready for discharge to rehab after completion of abx    Initial Physical Therapy Recommendations: rehab  Initial Occupational Therapy Recommendations: rehab    Home medications that are not reordered and reason why:   Toprol/cozaar-hypotension     Spoke with Dr Jagruti Osborne  regarding transfer  Please contact critical care via [employer] with any questions or concerns  Portions of the record may have been created with voice recognition software  Occasional wrong word or "sound a like" substitutions may have occurred due to the inherent limitations of voice recognition software  Read the chart carefully and recognize, using context, where substitutions have occurred

## 2021-02-01 NOTE — ASSESSMENT & PLAN NOTE
· Unable to perform MRI 2/2 recent LAMINE and PPM placement  · EEG negative  · Keppra 500 b i d    · Appreciate neurology input

## 2021-02-01 NOTE — ASSESSMENT & PLAN NOTE
As exhibited by hypotension, hypoxemia, sputum, leukocytosis   Culture data negative, procal negative x2  Cefdinir/Flagyl day 3/5

## 2021-02-01 NOTE — PLAN OF CARE
Problem: PHYSICAL THERAPY ADULT  Goal: Performs mobility at highest level of function for planned discharge setting  See evaluation for individualized goals  Description: Treatment/Interventions: Functional transfer training, LE strengthening/ROM, Therapeutic exercise, Endurance training, Patient/family training, Equipment eval/education, Bed mobility, Gait training, Spoke to nursing  Equipment Recommended: Paola Santos       See flowsheet documentation for full assessment, interventions and recommendations  Note: Prognosis: Good  Problem List: Decreased strength, Decreased endurance, Impaired balance, Decreased mobility, Decreased safety awareness, Decreased cognition, Pain  Assessment: Pt seen for high complexity PT evaluation due to decrease in functional mobility status compared to baseline  Pt with active PT eval/treat orders at this time  Pt is a 79 y o  F who presented to [facility] with new onset seizure like activity on 1/29/21  Pt admitted from [facility] following a rapid response/stroke alert due to R gaze preference, eyes rolled up in head, and tremors in all 4 extremities  CT head negative for acute abnormalities  Pt with recent admission for chest pain s/p stent placement and PPM placement on 1/19/21  Pt  has a past medical history of Anal fissure, Cardiac disorder, Cognitive changes (12/23/2020), Esophageal reflux, Esophagitis, reflux, Hemorrhoids, Hepatic hemangioma, Herpes zoster, History of colonic polyps, Hypertension, Ischemic colitis ([address] ), Lumbar herniated disc, Malignant neoplasm without specification of site ([address] ), Nephrolithiasis, Nontoxic single thyroid nodule, Osteoarthritis, Overactive bladder, Raynaud disease, Respiratory system disease, Sjogren's disease ([address] ), Spinal stenosis, PONCHO (stress urinary incontinence, female), and Uterovaginal prolapse  Pt resides with  in [facility] with 3[facility]    Pt presents with decreased strength, balance, endurance that contribute to limitations in bed mobility, functional transfers, functional mobility  Pt requires Mod A for supine>sit, STS, and ambulation with RW at this time  Pt left upright in bedside chair with chair alarm intact and with all needs in reach  Pt will benefit from skilled therapy in order to address current impairments and functional limitations  PT to follow pt and recommending rehab once medically cleared  The patient's AM-PAC Basic Mobility Inpatient Short Form Raw Score is 13, Standardized Score is 33 99  A standardized score less than 42 9 suggests the patient may benefit from discharge to post-acute rehabilitation services  Please also refer to the recommendation of the Physical Therapist for safe discharge planning  Barriers to Discharge: Inaccessible home environment, Decreased caregiver support     PT Discharge Recommendation: [id number]     PT - OK to Discharge: Yes(to rehab once medically cleared)    See flowsheet documentation for full assessment

## 2021-02-01 NOTE — PHYSICAL THERAPY NOTE
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[facility] )    CAD (coronary artery disease)    [facility]    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[facility] )    PAD (peripheral artery disease) (HCC)    Dysphagia    Abnormal liver enzymes    Edema of left upper extremity    Urinary retention    Diarrhea    Seizure-like activity ([facility] )    Hyperlipidemia    Toxic metabolic encephalopathy    Aspiration pneumonia ([facility] )       Past Medical History  Past Medical History:   Diagnosis Date    Anal fissure     Cardiac disorder     Cognitive changes 12/23/2020    Esophageal reflux     Esophagitis, reflux     Hemorrhoids     Hepatic hemangioma     Last Assessed: 1/13/2015    Herpes zoster     History of colonic polyps     Hypertension     Ischemic colitis ([facility] )     Lumbar herniated disc     Malignant neoplasm without specification of site ([place] [address] )     Nephrolithiasis     L   Lithotripsy    Nontoxic single thyroid nodule     Last Assessed: 1/13/2015    Osteoarthritis     Overactive bladder     Raynaud disease     Respiratory system disease     Sjogren's disease ([facility] )     Spinal stenosis     PONCHO (stress urinary incontinence, female)     Uterovaginal prolapse     Grade I-II       Past Surgical History  Past Surgical History:   Procedure Laterality Date    APPENDECTOMY  1947    CARDIAC SURGERY      CABG    CATARACT EXTRACTION Bilateral     COLONOSCOPY  2012    Fiberoptic    COLONOSCOPY      Resolved: 2006 - 2012 5 year f/u    CORONARY ANGIOPLASTY WITH STENT PLACEMENT      CORONARY ARTERY BYPASS GRAFT      Resolved: 2012    ESOPHAGOGASTRODUODENOSCOPY  2012    Diagnostic    HEMORROIDECTOMY      KNEE SURGERY      LITHOTRIPSY      Renal    MALIGNANT SKIN LESION EXCISION      Face; Resolved: 2004    DE ESOPHAGOGASTRODUODENOSCOPY TRANSORAL DIAGNOSTIC N/A 4/13/2016    Procedure: EGD AND COLONOSCOPY;  Surgeon: Jatin Delvalle MD;  [facility]; Service: Gastroenterology    RENAL ARTERY STENT      SKIN LESION EXCISION      Scalp    SOFT TISSUE TUMOR RESECTION      Shoulder; Resolved: 1995    THROMBOLYSIS      Postoperative Thrombolysis PTCA    TONSILLECTOMY      Resolved: 1944 02/01/21 1035   PT Last Visit   PT Visit Date 02/01/21   Note Type   Note type Evaluation   Pain Assessment   Pain Assessment Tool 0-10   Pain Score 3   Pain Location/Orientation Orientation: Left; Location: Abdomen   Patient's Stated Pain Goal No pain   Hospital Pain Intervention(s) Repositioned; Ambulation/increased activity; Rest   Home Living   Type of [address] One level  (3STE)   Bathroom Shower/Tub Walk-in shower   Bathroom Toilet Standard   Bathroom Equipment Shower chair   Home Equipment Walker;Cane  (denies use PTA)   Prior Function   Level of [place] Independent with ADLs and functional mobility   Lives With Spouse   ADL Assistance Independent   IADLs Independent   Falls in the last 6 months 0   Vocational Retired   Restrictions/Precautions   [facility] Bearing Precautions Per Order No   Other Precautions Cognitive; Chair Alarm; Bed Alarm;Telemetry;Multiple lines;Pain; Fall Risk;O2   General   Family/Caregiver Present No   Cognition   Overall Cognitive Status Impaired   Arousal/Participation Alert   Attention Attends with cues to redirect   Orientation Level Oriented to person;Oriented to place;Oriented to time;Disoriented to situation   Memory Decreased recall of precautions;Decreased recall of recent events   Following Commands Follows one step commands with increased time or repetition   Comments Pt does not recall being at hospital and being at acute rehab before, believes this has all been one admisison  Pt reports she came to this room from her home by ambulanace    Pt reports she feels there are no changes with her thinking  RLE Assessment   RLE Assessment   (grossly 3+/5)   LLE Assessment   LLE Assessment   (grossly 3+/5, except hip flexion 3/5)   Light Touch   RLE Light Touch Grossly intact   LLE Light Touch Grossly intact   Bed Mobility   Supine to Sit 3  Moderate assistance   Additional items Assist x 1; Increased time required;Verbal cues;LE management   Additional Comments Supine in bed upon PT arrival   Pt left upright in bedside chair with chair alarm intact and all needs in reach at end of therapy session  Transfers   Sit to Stand 3  Moderate assistance   Additional items Assist x 1; Increased time required;Verbal cues   Stand to Sit 3  Moderate assistance   Additional items Assist x 1; Increased time required;Verbal cues   Additional Comments Transfers with RW   VC for hand placement and safety  Retropulsive on stance  Ambulation/Elevation   Gait pattern Excessively slow; Short stride; Foward flexed;Decreased foot clearance   Gait Assistance 3  Moderate assist   Additional items Assist x 1;Verbal cues; Tactile cues  (assist of second for lines)   Assistive Device Rolling walker   Distance 12 ft x 1  (limited by fatigue)   Balance   Static Sitting Fair   Dynamic Sitting Fair -   Static Standing Poor +   Dynamic Standing Poor   Ambulatory Poor   Endurance Deficit   Endurance Deficit Yes   Endurance Deficit Description fatigue, weakness   Activity Tolerance   Activity Tolerance Patient limited by fatigue   Medical Staff Made Aware restorative tech Kin Bleacher   Nurse Made Aware RN cleared pt to be seen by PT   Assessment   Prognosis Good   Problem List Decreased strength;Decreased endurance; Impaired balance;Decreased mobility; Decreased safety awareness;Decreased cognition;Pain   Assessment Pt seen for high complexity PT evaluation due to decrease in functional mobility status compared to baseline  Pt with active PT eval/treat orders at this time  Pt is a 79 y o   F who presented to [facility] with new onset seizure like activity on 1/29/21  Pt admitted from [facility] following a rapid response/stroke alert due to R gaze preference, eyes rolled up in head, and tremors in all 4 extremities  CT head negative for acute abnormalities  Pt with recent admission for chest pain s/p stent placement and PPM placement on 1/19/21  Pt  has a past medical history of Anal fissure, Cardiac disorder, Cognitive changes (12/23/2020), Esophageal reflux, Esophagitis, reflux, Hemorrhoids, Hepatic hemangioma, Herpes zoster, History of colonic polyps, Hypertension, Ischemic colitis ([address] ), Lumbar herniated disc, Malignant neoplasm without specification of site ([facility] ), Nephrolithiasis, Nontoxic single thyroid nodule, Osteoarthritis, Overactive bladder, Raynaud disease, Respiratory system disease, Sjogren's disease ([address] ), Spinal stenosis, PONCHO (stress urinary incontinence, female), and Uterovaginal prolapse  Pt resides with  in [facility] with 3STE  Pt presents with decreased strength, balance, endurance that contribute to limitations in bed mobility, functional transfers, functional mobility  Pt requires Mod A for supine>sit, STS, and ambulation with RW at this time  Pt left upright in bedside chair with chair alarm intact and with all needs in reach  Pt will benefit from skilled therapy in order to address current impairments and functional limitations  PT to follow pt and recommending rehab once medically cleared  The patient's AM-PAC Basic Mobility Inpatient Short Form Raw Score is 13, Standardized Score is 33 99  A standardized score less than 42 9 suggests the patient may benefit from discharge to post-acute rehabilitation services  Please also refer to the recommendation of the Physical Therapist for safe discharge planning  Barriers to Discharge Inaccessible home environment;Decreased caregiver support   Goals   Patient Goals to rest   STG Expiration Date 02/15/21   Short Term Goal #1 1   Pt will demonstrate ability to perform all aspects of bed mobility with supervision A in order to increase independence and decrease burden on caregivers  2  Pt will demonstrate ability to perform functional transfers with supervision A in order to increase independence and decrease burden on caregivers  3  Pt will demonstrate ability to ambulate 200 ft with least restrictive AD with supervision A in order to return to mobility safely  4  Pt will demonstrate ability to negotiate 3 steps with/without HR and supervision A in order to return to household/community mobility safely  5  Pt will demonstrate improved balance by one grade order to decrease risk of falls  6  Pt will increase b/l LE strength by 1 grade in order to increase ease of functional mobility and transfers  Plan   Treatment/Interventions Functional transfer training;LE strengthening/ROM; Therapeutic exercise; Endurance training;Patient/family training;Equipment eval/education; Bed mobility;Gait training;Spoke to nursing   PT Frequency Other (Comment)  (3-5x/wk)   Recommendation   PT Discharge Recommendation Post-Acute Rehabilitation Services   Equipment Recommended Walker   PT - OK to Discharge Yes  (to rehab once medically cleared)   [facility] [address] Mobility Inpatient   Turning in Bed Without Bedrails 3   Lying on Back to Sitting on Edge of Flat Bed 2   Moving Bed to Chair 2   Standing Up From Chair 2   Walk in Room 2   Climb 3-5 Stairs 2   Basic Mobility Inpatient Raw Score 13   Basic Mobility Standardized Score 33 99   Modified Lily Scale   Modified Zurich Scale 4         Kasey Rasmussen, PT, DPT

## 2021-02-01 NOTE — ASSESSMENT & PLAN NOTE
Likely related to a combination seizure like activity combined with postictal state  Monitor mental status, GCS 14 pleasantly demented, per family this is her baseline

## 2021-02-01 NOTE — ASSESSMENT & PLAN NOTE
Wt Readings from Last 3 Encounters:   02/01/21 64 5 kg (142 lb 3 2 oz)   01/27/21 70 kg (154 lb 4 8 oz)   01/21/21 63 7 kg (140 lb 6 4 oz)     grade 1 DD  - EF 45%  -daily lasix

## 2021-02-01 NOTE — PROGRESS NOTES
Daily Progress Note - Critical Care   Albaro Rios 79 y o  female MRN: [id number]  Unit/Bed#: [facility] 237-39 Encounter: [id number]        ----------------------------------------------------------------------------------------  HPI/24hr events:   No acute events overnight      1/07-1/17 [facility]: CC for CP, LHC, LAMINE LM and LAD (1/11/2021), aspiration PNA (completed abx course), afib with conversion sinus pause tx to [facility] for EP  1/17-1/21 [facility]: PPM dual chamber placed 1/19/2021 1/21 Admit to [facility]-> 1/29 RRT admitted to [facility]  and tx to ICU for hypotension and encephalopathy after seizure like activity     ---------------------------------------------------------------------------------------  SUBJECTIVE  "I would like to go home, if I die then I die at home and not in the hospital"    Review of Systems   Constitutional: Negative  HENT: Negative  Eyes: Negative  Respiratory: Negative  Cardiovascular: Negative  Gastrointestinal: Negative  Endocrine: Negative  Genitourinary: Negative  Musculoskeletal: Positive for arthralgias  L UE discomfort   Allergic/Immunologic: Negative  Neurological: Negative  Hematological: Bruises/bleeds easily  Psychiatric/Behavioral: Negative  All other systems reviewed and are negative      Review of systems was reviewed and negative unless stated above in HPI/24-hour events   ---------------------------------------------------------------------------------------  Assessment and Plan:    Neuro:  Diagnosis: Encephalopathy (toxic metabolic) improved, seizure   Plan:  · Delirium monitoring/management  · Regulate Sleep-wake cycle/CAM-ICU daily  · Neurology following  · Serial Neuro Exams  · Continue Keppra  · F/U on official read of spot EEG  Cardiac  Diagnosis:Shock (resolved), elevated troponin, history of HTN and HLD, afib with tachy-jillian syndrome s/p PPM, afib (now nsr), combined systolic and diastolic HF, hx CAD (LAMINE to LM and LAD),  PAD (R SC artery chronically occluded, L SC artery with mild to moderate stenosis)   Plan:  · Cardiac infusions: None  · MAP goal > 65 and SBP <140  · Rhythm: A-Paced  · Follow rhythm on telemetry  · Home medications: Isorbide (on hold), metoprolol (on hold), norvasc (on hold), renexa (on hold)  · Continue Effient and Xarelto, continue statin  · 1/30 ECHO: EF 50%, grade 2 DD, Peak PA pressure 64mmHg, severe pulmonary htn  Pulmonary  Diagnosis: Aspiration PNA, Acute Respiratory insufficiency  Plan:  · Chlorhexidine ordered: no  · Pulmonary toileting  · Wean FiO2 as able  · Remains off BIPAP x 24 hours on NC  Gastrointestinal  Diagnosis: hx of GERD and Ischemic colitis  Plan:  · Continue Psyllium and Florastor  ·   FEN  Diagnosis: No acute issues  Plan:  · Fluid/Diuretic plan: net negative  · Goal 24 hour fluid balance: Lasix 40mg PO daily  · Nutrition/diet plan: Dysphagia diet  · Replete electrolytes with goals: K >4 0, Mag >2 0, and Phos >3 0  Genitourinary  Diagnosis: Urinary retention  Plan:  · Indwelling Saeed present: yes, placed 1/23, voiding trial today  · Trend UOP and BUN/creat  · Strict I and O  Infectious Diease  Diagnosis: Possible Aspiration PNA  Plan:  · Abx ordered: Cefepime and MetronidazoleD#3  · Trend temps and WBC count  Procalcitonin:  Results from last 7 days   Lab Units 02/01/21  0419 01/31/21  0616 01/30/21  1220   PROCALCITONIN ng/ml 0 07 <0 05 0 07     Heme:   Diagnosis: NO acute issues  Plan:  · Trend hgb and plts  · Transfuse as needed for goal hgb >7  · Continue Xarelto  Endo:   Diagnosis: NO acute issues  Plan:   SSI Alg #1   Adjust insulin regimen as needed to maintain goal -180  Lab Results   Component Value Date    HGBA1C 5 5 02/05/2019   ·      MSK/Skin:  · Early Mobility/Exercise  · PT consult: yes  · OT consult: yes  · Turn and position patient Q2 hours  · Off load pressure points  · Allevyn preventative per protocol  · Consider PICC access and then d/c Fem TLC  Family:  · Family updated within 24 hours: yes       Disposition: Transfer to Med Surg with Telemetry   Code Status: Level 1 - Full Code  ---------------------------------------------------------------------------------------  ICU CORE MEASURES    Prophylaxis   VTE Pharmacologic Prophylaxis: Rivaroxaban (Xarelto)  VTE Mechanical Prophylaxis: sequential compression device  Stress Ulcer Prophylaxis: Prophylaxis Not Indicated     ABCDE Protocol (if indicated)  Plan to perform spontaneous awakening trial today? Not applicable  Plan to perform spontaneous breathing trial today? Not applicable  Obvious barriers to extubation? Not applicable  CAM-ICU: Positive    Invasive Devices Review  Invasive Devices     Central Venous Catheter Line            CVC Central Lines 21 Triple Right Femoral 2 days          Drain            Urethral Catheter Other (Comment) 16 Fr  8 days              Can any invasive devices be discontinued today? Yes  ---------------------------------------------------------------------------------------  OBJECTIVE    Vitals   Vitals:    21 0105 21 0210 21 0310 21 0600   BP: 131/65 133/50 143/52    Pulse: 90 90 98    Resp: 20 19 20    Temp: 99 °F (37 2 °C) 98 6 °F (37 °C) 98 6 °F (37 °C)    TempSrc:       SpO2: 97% 99% 98%    Weight:    64 5 kg (142 lb 3 2 oz)     Temp (24hrs), Av 2 °F (37 3 °C), Min:98 6 °F (37 °C), Max:99 7 °F (37 6 °C)  Current: Temperature: 98 6 °F (37 °C)  HR: 91  BP: 143/52 (79)  RR: 16  SpO2: 97%    Respiratory:  SpO2: SpO2: 98 %  Nasal Cannula O2 Flow Rate (L/min): 2 L/min      Physical Exam  Constitutional:       General: She is not in acute distress  Interventions: Nasal cannula in place  HENT:      Head: Normocephalic and atraumatic  Eyes:      Pupils: Pupils are equal, round, and reactive to light  Cardiovascular:      Rate and Rhythm: Normal rate and regular rhythm  Pulses:           Radial pulses are 2+ on the right side and 2+ on the left side  Dorsalis pedis pulses are 1+ on the right side and 1+ on the left side  Heart sounds: Normal heart sounds  Comments: 1+ pitting LE  Pulmonary:      Effort: No accessory muscle usage  Breath sounds: Normal breath sounds  No wheezing  Abdominal:      General: Abdomen is flat  Palpations: Abdomen is soft  Tenderness: There is no abdominal tenderness  Skin:     General: Skin is warm  Capillary Refill: Capillary refill takes less than 2 seconds  Findings: Ecchymosis present  Neurological:      GCS: GCS eye subscore is 4  GCS verbal subscore is 4  GCS motor subscore is 6  Comments: Pt confused but cooperative this AM  MAET  Reports discomfort in LUE near NIBP cuff site   Psychiatric:         Behavior: Behavior is cooperative  Laboratory and Diagnostics:  Results from last 7 days   Lab Units 02/01/21  0418 01/31/21  0443 01/31/21  0027 01/30/21  0350 01/29/21  1746 01/29/21  1156 01/29/21  1146 01/29/21  0547 01/27/21  0545 01/26/21  1048  01/26/21  0551 01/25/21  1150   WBC Thousand/uL 6 13 7 15 10 77* 4 42  --  5 19  --  4 92 4 44 4 51  --  5 03 6 05   HEMOGLOBIN g/dL 9 1* 9 5* 11 0* 8 8*  --  10 1*  --  10 2* 9 2* 8 3*  --  7 9* 8 6*   I STAT HEMOGLOBIN g/dl  --   --   --   --  8 5*  --  9 5*  --   --   --    < >  --   --    HEMATOCRIT % 29 1* 29 6* 34 7* 27 8*  --  31 3*  --  32 1* 28 0* 26 8*  --  24 7* 26 7*   HEMATOCRIT, ISTAT %  --   --   --   --  25*  --  28*  --   --   --    < >  --   --    PLATELETS Thousands/uL 236 235 325 216  --  251  --  260 230 244  --  243 245   NEUTROS PCT %  --   --   --  55  --   --   --  49 67 76*  --  67 69   BANDS PCT %  --   --  4  --   --   --   --   --   --   --   --   --   --    MONOS PCT %  --   --   --  10  --   --   --  14* 13* 10  --  14* 14*   MONO PCT %  --   --  8  --   --   --   --   --   --   --   --   --   --     < > = values in this interval not displayed       Results from last 7 days   Lab Units 02/01/21 0418 01/31/21  4279 01/31/21  0027 01/30/21  0350 01/29/21  1746 01/29/21  1156 01/29/21  1146 01/29/21  0547 01/27/21  0545  01/26/21  0551   SODIUM mmol/L 140 138 135* 136  --  133*  --  135* 135*  --  130*   POTASSIUM mmol/L 3 6 4 0 4 2 3 6  --  4 1  --  3 5 3 7  --  3 8   CHLORIDE mmol/L 104 102 101 100  --  101  --  102 102  --  97*   CO2 mmol/L 31 30 29 30  --  29  --  25 29  --  28   CO2, I-STAT mmol/L  --   --   --   --  33*  --  30  --   --    < >  --    ANION GAP mmol/L 5 6 5 6  --  3*  --  8 4  --  5   BUN mg/dL 8 6 5 8  --  8  --  6 6  --  11   CREATININE mg/dL 0 59* 0 65 0 81 0 70  --  0 83  --  0 80 0 58*  --  0 82   CALCIUM mg/dL 8 2* 8 2* 8 7 7 9*  --  9 3  --  8 9 8 4  --  8 3   GLUCOSE RANDOM mg/dL 100 125 139 87  --  99  --  81 86  --  87   ALT U/L  --   --  27 21  --   --   --   --   --   --  23   AST U/L  --   --  28 20  --   --   --   --   --   --  21   ALK PHOS U/L  --   --  79 61  --   --   --   --   --   --  58   ALBUMIN g/dL  --   --  2 9* 2 4*  --   --   --   --   --   --  2 2*   TOTAL BILIRUBIN mg/dL  --   --  1 06* 0 91  --   --   --   --   --   --  1 12*    < > = values in this interval not displayed       Results from last 7 days   Lab Units 02/01/21 0418 01/31/21  0605 01/31/21  0027 01/30/21  0350   MAGNESIUM mg/dL 2 0 1 8 2 0 2 0   PHOSPHORUS mg/dL 2 5 3 3 3 4 3 6      Results from last 7 days   Lab Units 01/29/21  1156   INR  2 89*   PTT seconds 56*      Results from last 7 days   Lab Units 02/01/21  0419 01/31/21  0413 01/31/21  0027 01/29/21  1156 01/26/21  1049   TROPONIN I ng/mL 0 14* 0 33* 0 07* 0 02 0 04     Results from last 7 days   Lab Units 01/31/21  0604 01/31/21  0027 01/26/21  1048   LACTIC ACID mmol/L 1 1 2 5* 1 2     ABG:  Results from last 7 days   Lab Units 01/30/21  0458   PH ART  7 477*   PCO2 ART mm Hg 39 9   PO2 ART mm Hg 157 5*   HCO3 ART mmol/L 28 8*   BASE EXC ART mmol/L 4 9   ABG SOURCE  Radial, Left     VBG:  Results from last 7 days   Lab Units 01/30/21  0458   ABG SOURCE  Radial, Left     Results from last 7 days   Lab Units 02/01/21  0419 01/31/21  0616 01/30/21  1220   PROCALCITONIN ng/ml 0 07 <0 05 0 07       Micro  Results from last 7 days   Lab Units 01/30/21  1311 01/30/21  1155   BLOOD CULTURE  No Growth at 24 hrs  No Growth at 24 hrs  Intake and Output  I/O       01/30 0701 - 01/31 0700 01/31 0701 - 02/01 0700    P  O  298 120    I V  (mL/kg) 0 (0)     IV Piggyback 550 450    Total Intake(mL/kg) 848 (13) 570 (8 8)    Urine (mL/kg/hr) 2510 (1 6) 1630 (1)    Stool 0 0    Total Output 2510 1630    Net -1662 -1060          Unmeasured Stool Occurrence 1 x 1 x        UOP: 1ml/kg/hr/hour     Height and Weights      IBW: -92 5 kg  Body mass index is 29 72 kg/m²  Weight (last 2 days)     Date/Time   Weight    02/01/21 0600   64 5 (142 2)    01/31/21 0600   65 (143 2)    01/30/21 0600   64 4 (141 9)                Nutrition       Diet Orders   (From admission, onward)             Start     Ordered    01/31/21 1435  Dietary nutrition supplements  Once     Question Answer Comment   Select Supplement: Magic Cup Chocolate    Frequency Dinner        01/31/21 1434    01/31/21 1435  Dietary nutrition supplements  Once     Question Answer Comment   Select Supplement: MightyShake    Frequency Breakfast, Lunch, Dinner        01/31/21 1434    01/31/21 1434  Dietary nutrition supplements  Once     Question Answer Comment   Select Supplement: Magic Cup [place]    Frequency Lunch        01/31/21 1434    01/30/21 1614  Diet Cardiovascular; Cardiac; Dysphagia 1-Pureed; Nectar Thick Liquid  Diet effective now     Comments: Feed patient only when fully upright and alert   Question Answer Comment   Diet Type Cardiovascular    Cardiac Cardiac    Other Restriction(s): Dysphagia 1-Pureed    Liquid Modifier Nectar Thick Liquid    RD to adjust diet per protocol?  Yes        01/30/21 1613                  Active Medications  Scheduled Meds:  Current Facility-Administered Medications   Medication Dose Route Frequency Provider Last Rate    acetaminophen  650 mg Oral Q6H PRN Magdalena Whiteside PA-C      atorvastatin  20 mg Oral Daily With [employer]      cefepime  2,000 mg Intravenous Q12H KATHY Gonzales Stopped (01/31/21 2200)    furosemide  40 mg Oral Daily Lashell Spironello VKATHY      hydrALAZINE  5 mg Intravenous Q6H PRN Lashell Spirodaljito VKATHY      insulin lispro  1-5 Units Subcutaneous Q6H [address] Beatriz Massey PA-C      levETIRAcetam  500 mg Intravenous Q12H [address] Magdalena Whiteside PA-C Stopped (01/31/21 2129)    metroNIDAZOLE  500 mg Intravenous Q8H KATHY Gonzales 500 mg (02/01/21 0425)    ondansetron  4 mg Intravenous Q6H PRN Magdalena Whiteside PA-C      oxyCODONE  2 5 mg Oral Q4H PRN Beatriz Massey PA-C      prasugrel  10 mg Oral Daily Beatriz Massey PA-C      psyllium  1 packet Oral Daily Ricky cMkeon      rivaroxaban  15 mg Oral Daily With [facility] CARLY Massey      saccharomyces boulardii  250 mg Oral BID Beatriz Massey PA-C       Continuous Infusions:     PRN Meds:   acetaminophen, 650 mg, Q6H PRN  hydrALAZINE, 5 mg, Q6H PRN  ondansetron, 4 mg, Q6H PRN  oxyCODONE, 2 5 mg, Q4H PRN        Allergies   Allergies   Allergen Reactions    Sulfa Antibiotics Anaphylaxis    Formaldehyde     Codeine Palpitations     ---------------------------------------------------------------------------------------  Advance Directive and Living Will:      Power of :    POLST:    ---------------------------------------------------------------------------------------  Care Time Delivered:   No Critical Care time spent     KATHY Gonzales      Portions of the record may have been created with voice recognition software  Occasional wrong word or "sound a like" substitutions may have occurred due to the inherent limitations of voice recognition software    Read the chart carefully and recognize, using context, where substitutions have occurred

## 2021-02-02 ENCOUNTER — APPOINTMENT (INPATIENT)
Dept: RADIOLOGY | Facility: HOSPITAL | Age: 81
DRG: 100 | End: 2021-02-02
Payer: MEDICARE

## 2021-02-02 LAB
ANION GAP SERPL CALCULATED.3IONS-SCNC: 4 MMOL/L (ref 4–13)
ANION GAP SERPL CALCULATED.3IONS-SCNC: 8 MMOL/L (ref 4–13)
BASOPHILS # BLD AUTO: 0.04 THOUSANDS/ΜL (ref 0–0.1)
BASOPHILS # BLD AUTO: 0.05 THOUSANDS/ΜL (ref 0–0.1)
BASOPHILS NFR BLD AUTO: 1 % (ref 0–1)
BASOPHILS NFR BLD AUTO: 1 % (ref 0–1)
BUN SERPL-MCNC: 8 MG/DL (ref 5–25)
BUN SERPL-MCNC: 9 MG/DL (ref 5–25)
CALCIUM SERPL-MCNC: 8.6 MG/DL (ref 8.3–10.1)
CALCIUM SERPL-MCNC: 8.6 MG/DL (ref 8.3–10.1)
CHLORIDE SERPL-SCNC: 103 MMOL/L (ref 100–108)
CHLORIDE SERPL-SCNC: 103 MMOL/L (ref 100–108)
CO2 SERPL-SCNC: 26 MMOL/L (ref 21–32)
CO2 SERPL-SCNC: 30 MMOL/L (ref 21–32)
CREAT SERPL-MCNC: 0.55 MG/DL (ref 0.6–1.3)
CREAT SERPL-MCNC: 0.59 MG/DL (ref 0.6–1.3)
EOSINOPHIL # BLD AUTO: 0.1 THOUSAND/ΜL (ref 0–0.61)
EOSINOPHIL # BLD AUTO: 0.13 THOUSAND/ΜL (ref 0–0.61)
EOSINOPHIL NFR BLD AUTO: 1 % (ref 0–6)
EOSINOPHIL NFR BLD AUTO: 2 % (ref 0–6)
ERYTHROCYTE [DISTWIDTH] IN BLOOD BY AUTOMATED COUNT: 14.8 % (ref 11.6–15.1)
ERYTHROCYTE [DISTWIDTH] IN BLOOD BY AUTOMATED COUNT: 14.9 % (ref 11.6–15.1)
GFR SERPL CREATININE-BSD FRML MDRD: 86 ML/MIN/1.73SQ M
GFR SERPL CREATININE-BSD FRML MDRD: 88 ML/MIN/1.73SQ M
GLUCOSE SERPL-MCNC: 104 MG/DL (ref 65–140)
GLUCOSE SERPL-MCNC: 109 MG/DL (ref 65–140)
GLUCOSE SERPL-MCNC: 111 MG/DL (ref 65–140)
GLUCOSE SERPL-MCNC: 119 MG/DL (ref 65–140)
GLUCOSE SERPL-MCNC: 121 MG/DL (ref 65–140)
GLUCOSE SERPL-MCNC: 98 MG/DL (ref 65–140)
HCT VFR BLD AUTO: 33.4 % (ref 34.8–46.1)
HCT VFR BLD AUTO: 34 % (ref 34.8–46.1)
HGB BLD-MCNC: 10.4 G/DL (ref 11.5–15.4)
HGB BLD-MCNC: 10.9 G/DL (ref 11.5–15.4)
IMM GRANULOCYTES # BLD AUTO: 0.11 THOUSAND/UL (ref 0–0.2)
IMM GRANULOCYTES # BLD AUTO: 0.14 THOUSAND/UL (ref 0–0.2)
IMM GRANULOCYTES NFR BLD AUTO: 1 % (ref 0–2)
IMM GRANULOCYTES NFR BLD AUTO: 2 % (ref 0–2)
LYMPHOCYTES # BLD AUTO: 2.66 THOUSANDS/ΜL (ref 0.6–4.47)
LYMPHOCYTES # BLD AUTO: 3.64 THOUSANDS/ΜL (ref 0.6–4.47)
LYMPHOCYTES NFR BLD AUTO: 37 % (ref 14–44)
LYMPHOCYTES NFR BLD AUTO: 45 % (ref 14–44)
MAGNESIUM SERPL-MCNC: 1.8 MG/DL (ref 1.6–2.6)
MAGNESIUM SERPL-MCNC: 3.6 MG/DL (ref 1.6–2.6)
MCH RBC QN AUTO: 29.9 PG (ref 26.8–34.3)
MCH RBC QN AUTO: 30.4 PG (ref 26.8–34.3)
MCHC RBC AUTO-ENTMCNC: 31.1 G/DL (ref 31.4–37.4)
MCHC RBC AUTO-ENTMCNC: 32.1 G/DL (ref 31.4–37.4)
MCV RBC AUTO: 95 FL (ref 82–98)
MCV RBC AUTO: 96 FL (ref 82–98)
MONOCYTES # BLD AUTO: 0.61 THOUSAND/ΜL (ref 0.17–1.22)
MONOCYTES # BLD AUTO: 0.69 THOUSAND/ΜL (ref 0.17–1.22)
MONOCYTES NFR BLD AUTO: 10 % (ref 4–12)
MONOCYTES NFR BLD AUTO: 8 % (ref 4–12)
NEUTROPHILS # BLD AUTO: 3.35 THOUSANDS/ΜL (ref 1.85–7.62)
NEUTROPHILS # BLD AUTO: 3.61 THOUSANDS/ΜL (ref 1.85–7.62)
NEUTS SEG NFR BLD AUTO: 43 % (ref 43–75)
NEUTS SEG NFR BLD AUTO: 49 % (ref 43–75)
NRBC BLD AUTO-RTO: 0 /100 WBCS
NRBC BLD AUTO-RTO: 0 /100 WBCS
PHOSPHATE SERPL-MCNC: 2.3 MG/DL (ref 2.3–4.1)
PHOSPHATE SERPL-MCNC: 2.4 MG/DL (ref 2.3–4.1)
PLATELET # BLD AUTO: 229 THOUSANDS/UL (ref 149–390)
PLATELET # BLD AUTO: 236 THOUSANDS/UL (ref 149–390)
PMV BLD AUTO: 9.7 FL (ref 8.9–12.7)
PMV BLD AUTO: 9.7 FL (ref 8.9–12.7)
POTASSIUM SERPL-SCNC: 3.6 MMOL/L (ref 3.5–5.3)
POTASSIUM SERPL-SCNC: 3.8 MMOL/L (ref 3.5–5.3)
RBC # BLD AUTO: 3.48 MILLION/UL (ref 3.81–5.12)
RBC # BLD AUTO: 3.59 MILLION/UL (ref 3.81–5.12)
SODIUM SERPL-SCNC: 137 MMOL/L (ref 136–145)
SODIUM SERPL-SCNC: 137 MMOL/L (ref 136–145)
TROPONIN I SERPL-MCNC: 0.06 NG/ML
TROPONIN I SERPL-MCNC: 0.07 NG/ML
WBC # BLD AUTO: 7.25 THOUSAND/UL (ref 4.31–10.16)
WBC # BLD AUTO: 7.88 THOUSAND/UL (ref 4.31–10.16)

## 2021-02-02 PROCEDURE — NC001 PR NO CHARGE: Performed by: NURSE PRACTITIONER

## 2021-02-02 PROCEDURE — 84100 ASSAY OF PHOSPHORUS: CPT | Performed by: PHYSICIAN ASSISTANT

## 2021-02-02 PROCEDURE — 92526 ORAL FUNCTION THERAPY: CPT

## 2021-02-02 PROCEDURE — 97167 OT EVAL HIGH COMPLEX 60 MIN: CPT

## 2021-02-02 PROCEDURE — 85025 COMPLETE CBC W/AUTO DIFF WBC: CPT | Performed by: PHYSICIAN ASSISTANT

## 2021-02-02 PROCEDURE — 71045 X-RAY EXAM CHEST 1 VIEW: CPT

## 2021-02-02 PROCEDURE — 84484 ASSAY OF TROPONIN QUANT: CPT | Performed by: NURSE PRACTITIONER

## 2021-02-02 PROCEDURE — 83735 ASSAY OF MAGNESIUM: CPT | Performed by: PHYSICIAN ASSISTANT

## 2021-02-02 PROCEDURE — 85025 COMPLETE CBC W/AUTO DIFF WBC: CPT | Performed by: NURSE PRACTITIONER

## 2021-02-02 PROCEDURE — 99232 SBSQ HOSP IP/OBS MODERATE 35: CPT | Performed by: PSYCHIATRY & NEUROLOGY

## 2021-02-02 PROCEDURE — 80048 BASIC METABOLIC PNL TOTAL CA: CPT | Performed by: NURSE PRACTITIONER

## 2021-02-02 PROCEDURE — NC001 PR NO CHARGE: Performed by: PHYSICIAN ASSISTANT

## 2021-02-02 PROCEDURE — 80048 BASIC METABOLIC PNL TOTAL CA: CPT | Performed by: PHYSICIAN ASSISTANT

## 2021-02-02 PROCEDURE — 82948 REAGENT STRIP/BLOOD GLUCOSE: CPT

## 2021-02-02 PROCEDURE — 99291 CRITICAL CARE FIRST HOUR: CPT | Performed by: NURSE PRACTITIONER

## 2021-02-02 RX ORDER — METOPROLOL TARTRATE 5 MG/5ML
5 INJECTION INTRAVENOUS ONCE
Status: COMPLETED | OUTPATIENT
Start: 2021-02-02 | End: 2021-02-02

## 2021-02-02 RX ORDER — MIDAZOLAM HYDROCHLORIDE 2 MG/2ML
INJECTION, SOLUTION INTRAMUSCULAR; INTRAVENOUS
Status: DISCONTINUED
Start: 2021-02-02 | End: 2021-02-02 | Stop reason: WASHOUT

## 2021-02-02 RX ORDER — POTASSIUM CHLORIDE 20 MEQ/1
20 TABLET, EXTENDED RELEASE ORAL ONCE
Status: COMPLETED | OUTPATIENT
Start: 2021-02-02 | End: 2021-02-02

## 2021-02-02 RX ORDER — PANTOPRAZOLE SODIUM 40 MG/1
40 TABLET, DELAYED RELEASE ORAL
Status: DISCONTINUED | OUTPATIENT
Start: 2021-02-03 | End: 2021-02-13 | Stop reason: HOSPADM

## 2021-02-02 RX ORDER — MAGNESIUM SULFATE 500 MG/ML
VIAL (ML) INJECTION CODE/TRAUMA/SEDATION MEDICATION
Status: COMPLETED | OUTPATIENT
Start: 2021-02-02 | End: 2021-02-02

## 2021-02-02 RX ADMIN — DEXTROSE MONOHYDRATE 150 MG: 5 INJECTION INTRAVENOUS at 16:39

## 2021-02-02 RX ADMIN — POTASSIUM CHLORIDE 20 MEQ: 1500 TABLET, EXTENDED RELEASE ORAL at 18:34

## 2021-02-02 RX ADMIN — METRONIDAZOLE 500 MG: 500 TABLET ORAL at 14:53

## 2021-02-02 RX ADMIN — LEVETIRACETAM 500 MG: 500 TABLET, FILM COATED ORAL at 08:13

## 2021-02-02 RX ADMIN — FUROSEMIDE 40 MG: 40 TABLET ORAL at 08:13

## 2021-02-02 RX ADMIN — Medication 1 PACKET: at 08:20

## 2021-02-02 RX ADMIN — CEFDINIR 300 MG: 300 CAPSULE ORAL at 06:02

## 2021-02-02 RX ADMIN — AMIODARONE HYDROCHLORIDE 1 MG/MIN: 50 INJECTION, SOLUTION INTRAVENOUS at 17:09

## 2021-02-02 RX ADMIN — LEVETIRACETAM 500 MG: 500 TABLET, FILM COATED ORAL at 22:50

## 2021-02-02 RX ADMIN — ONDANSETRON 4 MG: 2 INJECTION INTRAMUSCULAR; INTRAVENOUS at 05:05

## 2021-02-02 RX ADMIN — PRASUGREL HYDROCHLORIDE 10 MG: 10 TABLET, FILM COATED ORAL at 08:13

## 2021-02-02 RX ADMIN — MAGNESIUM SULFATE HEPTAHYDRATE 2 G: 500 INJECTION, SOLUTION INTRAMUSCULAR; INTRAVENOUS at 16:42

## 2021-02-02 RX ADMIN — METOROPROLOL TARTRATE 5 MG: 5 INJECTION, SOLUTION INTRAVENOUS at 17:00

## 2021-02-02 RX ADMIN — ONDANSETRON 4 MG: 2 INJECTION INTRAMUSCULAR; INTRAVENOUS at 18:40

## 2021-02-02 RX ADMIN — OXYCODONE HYDROCHLORIDE 2.5 MG: 5 TABLET ORAL at 01:38

## 2021-02-02 RX ADMIN — Medication 250 MG: at 08:20

## 2021-02-02 RX ADMIN — METRONIDAZOLE 500 MG: 500 TABLET ORAL at 10:50

## 2021-02-02 RX ADMIN — METRONIDAZOLE 500 MG: 500 TABLET ORAL at 06:02

## 2021-02-02 RX ADMIN — METOROPROLOL TARTRATE 5 MG: 5 INJECTION, SOLUTION INTRAVENOUS at 17:04

## 2021-02-02 NOTE — PROGRESS NOTES
Alerted that stat response was called  Patient in SVT   HR around 200  complianing of chest pain  Critical care at the bedside and will take over her care  Critical care spoke with family

## 2021-02-02 NOTE — CASE MANAGEMENT
Pt is <30-day readmit  Pt's last admit was 1/17/21 - 1/21/21 at [facility]  Pt is re-admitted to [facility] from [facility] where pt was placed for acute rehab on 1/21/21  CM obtained all the following info about the pt  HOME: Pt resides in a 1-story ranch house w/ 12 steps down to basement and 2 steps to enter at front door  LIVES W/:  only  : Pt's daughter Blaze Hubbard (c: [phone]) is primary contact for medical communication  Pt's  Zak Gómez (c: [phone]) is also a contact  INDEPENDENCE: Pt is normally independent at baseline w/ ambulating without DME and normally independent at baseline w/ performing her ADLS  DME: None reported  HHC: Pt has hx of services w/ [facility] in 2011  I/P REHAB: Noo hx of placements reported  MENTAL HEALTH ISSUES: No hx reported  D&A ISSUES: No hx reported  PCP: Dr Latoya Barrios through [facility]  PHARMACY: [facility] pharmacy located on [place] in [address] [place] [address]  INCOME SOURCE: Pension and Social Security benefits  INSURANCE: Medicare for primary healthcare coverage, [employer] supplemental plan for secondary healthcare coverage, and [employer] plan for Rx coverage  MEDICAL POA: No one is officially pre-designated  Pt's  is POA by legal default, only if needed  TRANSPORTATION AT D/C: Pt's family will provide transport if pt is discharged to home, otherwise pt will require ambulance transport if being d/c'd to a SNF for rehab  CM made Ecin referral to [facility] for them to follow pt's case  The pt and her family are all aware and agreeable to this referral     CM reviewed d/c planning process including the following: identifying help at home, patient preference for d/c planning needs, Discharge Lounge, [facility] Meds to Bed program, availability of treatment team to discuss questions or concerns patient and/or family may have regarding understanding medications and recognizing signs and symptoms once discharged  CM also encouraged patient to follow up with all recommended appointments after discharge  Patient advised of importance for patient and family to participate in managing patients medical well being  Patient/caregiver received discharge checklist  Content reviewed  Patient/caregiver encouraged to participate in discharge plan of care prior to discharge home

## 2021-02-02 NOTE — PLAN OF CARE
Problem: OCCUPATIONAL THERAPY ADULT  Goal: Performs self-care activities at highest level of function for planned discharge setting  See evaluation for individualized goals  Description: Treatment Interventions: ADL retraining, Functional transfer training, Endurance training, Patient/family training, Compensatory technique education          See flowsheet documentation for full assessment, interventions and recommendations  Note: Limitation: Decreased ADL status, Decreased endurance, Decreased self-care trans, Decreased high-level ADLs  Prognosis: Good  Assessment: Pt is a 79 y o  YO  female [facility] [place] [facility]21 from [facility] w/ seizure-like activity, pt w/ 2 episodes of AMS w/ shaking, R gaze preference, and unresponsiveness  Pt  has a past medical history of Anal fissure, Cardiac disorder, Cognitive changes, Esophageal reflux, Esophagitis, reflux, Hemorrhoids, Hepatic hemangioma, Herpes zoster, History of colonic polyps, Hypertension, Ischemic colitis ([facility] ), Lumbar herniated disc, Malignant neoplasm without specification of site ([facility] ), Nephrolithiasis, Nontoxic single thyroid nodule, Osteoarthritis, Overactive bladder, Raynaud disease, Respiratory system disease, Sjogren's disease ([facility] ), Spinal stenosis, PONCHO (stress urinary incontinence, female), and Uterovaginal prolapse  Pt with active OT orders and up with assistance  orders    Pt resides in a ranch style home with aspouse  At baseline, I for ADLs/IADLs/functional mobility  Currently pt is Min A for Ub ADLs, Mod A for LB ADLs, and Mod A for transfers/functional mobility  Pt is limited at this time 2*: endurance, activity tolerance, functional mobility, balance, functional standing tolerance, unsupportive home environment, decreased I w/ ADLS/IADLS, cognitive impairments, decreased safety awareness and decreased insight into deficits  The following Occupational Performance Areas to address include: grooming, bathing/shower, toilet hygiene, dressing and functional mobility  Based on the aforementioned OT evaluation, functional performance deficits, and assessments, pt has been identified as a high complexity evaluation  From OT standpoint, anticipate d/c STR  Pt to continue to benefit from acute immediate OT services to address the following goals 3-5x/week to  w/in 10-14 days:   OT Discharge Recommendation: Post-Acute Rehabilitation Services  OT - OK to Discharge:  Yes

## 2021-02-02 NOTE — PROGRESS NOTES
Daily Progress Note - Critical Care   Marta Bello 79 y o  female MRN: [id number]  Unit/Bed#: [facility] 531-01 Encounter: [id number]        ----------------------------------------------------------------------------------------  HPI/24hr events: In brief this is an 48-year-old female initially presenting with Jingdong on 01/07 with complaint of chest pain  She has a past medical history of hypertension, coronary disease with history of stenting, heart failure, hyperlipidemia, gastroesophageal reflux disease, Sjogren's disease, and moderate pulmonary hypertension  She underwent a cardiac catheterization with placement of 2 stents  During this time she developed atrial fibrillation with rapid ventricular response complicated by sinus pauses  She was transferred to the [address] [place] [address] placement of permanent pacemaker on 01/19  She was discharged to acute rehab on 01/21  She was a rapid response on 01/29 for altered mental status patient for seizure stroke  Her EEG and neuro workup was largely negative however was concern for aspiration pneumonia and was started on IV antibiotics  She was transferred out of the ICU on 02/01  She was rapid response again on 02/02 with complaint of generalized malaise, chest pain, and a heart rate between 160-200  There was difficulty obtaining IV access and she was ultimately given 150 mg of push dose amiodarone, started on a new mural infusion, and given 2 doses of 5 mg of metoprolol with intermittent transition between sinus rhythm in atrial fibrillation with rapid ventricular response  A consult was placed to Cardiology and she was transferred to the critical care unit for further vomiting during     ---------------------------------------------------------------------------------------  SUBJECTIVE  I do not feel well    Review of Systems   Constitutional: Positive for chills  Respiratory: Positive for chest tightness and shortness of breath  Cardiovascular: Positive for chest pain  Gastrointestinal: Negative for nausea  Musculoskeletal: Negative for arthralgias  Neurological: Positive for light-headedness  ---------------------------------------------------------------------------------------  Assessment and Plan:    Neuro:    Diagnosis:  Acute encephalopathy, seizure-like activity  o Plan:  Frequent neurologic monitoring, patient appears to be close to baseline neurologically, continue Keppra per Neurology recommendations, delirium precautions      CV:    Diagnosis:  Atrial fibrillation with rapid ventricular response, tachy-jillian syndrome status post permanent pacemaker, history of heart failure, history of hypertension, hyperlipidemia  o Plan:  Continue amiodarone infusion for now, consider p r n   Metoprolol versus continues asthma wall infusion, can continue Xarelto for now, continue Effient and statin, cardiology consult, trend troponins, continue present dose of Lasix      Pulm:   Diagnosis:  Pulmonary insufficiency  o Plan:  Continue nasal cannula for now, respiratory protocol      GI:    Diagnosis:  Gastroesophageal reflux disease, diarrhea presumed noninfectious  o Plan:  Continue home PPI, continue Metamucil for bulking      :    Diagnosis:  Urinary retention with urinary catheter present  o Plan:  Would attempt voiding trial when able ambulate, if patient fails voiding trial would consult Urology    F/E/N:    Plan:  Can continue oral diet for now, replete electrolytes as necessary      Heme/Onc:    Diagnosis:  Anemia  o Plan:  Appears stable, close monitoring given present at the and Xarelto      Endo:   o Plan:  Follow blood sugars needed      ID:    Diagnosis:  Aspiration pneumonia  o Plan:  Complete scheduled cefdinir, follow temperature and white count      MSK/Skin:   o Plan:  Out of bed as tolerated, PT/OT      Disposition: Transfer to Critical Care   Code Status: Level 3 - DNAR and DNI  ---------------------------------------------------------------------------------------  ICU CORE MEASURES    Prophylaxis   VTE Pharmacologic Prophylaxis: Rivaroxaban (Xarelto)  VTE Mechanical Prophylaxis: sequential compression device  Stress Ulcer Prophylaxis: Pantoprazole PO    ABCDE Protocol (if indicated)  Plan to perform spontaneous awakening trial today? Not applicable  Plan to perform spontaneous breathing trial today? Not applicable  Obvious barriers to extubation? Not applicable  CAM-ICU: Negative    Invasive Devices Review  Invasive Devices     Peripheral Intravenous Line            Peripheral IV 21 Left;Ventral (anterior) Hand 1 day    Peripheral IV 21 Right less than 1 day    Peripheral IV 21 Right Forearm less than 1 day          Drain            Urethral Catheter Other (Comment) 16 Fr  10 days              Can any invasive devices be discontinued today? Not applicable  ---------------------------------------------------------------------------------------  OBJECTIVE    Vitals   Vitals:    21 1500 21 1638 21 1644 21 1654   BP: 98/64 (!) 77/46 132/70 137/63   Pulse: 93 (!) 171 (!) 135 (!) 134   Resp: 16  (!) 32 15   Temp: 99 °F (37 2 °C)      TempSrc: Oral      SpO2: 95% 99% 97% 98%   Weight:         Temp (24hrs), Av 8 °F (37 1 °C), Min:98 4 °F (36 9 °C), Max:99 1 °F (37 3 °C)  Current: Temperature: 99 °F (37 2 °C)  HR: 140  BP: 137/74  RR: 26  SpO2: 98    Respiratory:  SpO2: SpO2: 98 %, SpO2 Activity: SpO2 Activity: At Rest, SpO2 Device: O2 Device: Nasal cannula  Nasal Cannula O2 Flow Rate (L/min): 2 L/min    Invasive/non-invasive ventilation settings   Respiratory    Lab Data (Last 4 hours)    None         O2/Vent Data (Last 4 hours)    None                Physical Exam  Constitutional:       General: She is in acute distress  Appearance: She is ill-appearing  Interventions: Nasal cannula in place     HENT:      Head: Normocephalic and atraumatic  Mouth/Throat:      Mouth: Mucous membranes are dry  Eyes:      Pupils: Pupils are equal, round, and reactive to light  Neck:      Musculoskeletal: No neck rigidity  Cardiovascular:      Rate and Rhythm: Tachycardia present  Rhythm irregular  Pulses: Normal pulses  Pulmonary:      Effort: Pulmonary effort is normal       Breath sounds: Decreased breath sounds present  Abdominal:      General: Abdomen is flat  Bowel sounds are normal  There is no distension  Palpations: Abdomen is soft  Genitourinary:     Comments: Aseed in place with yellow urine  Musculoskeletal:         General: Tenderness (Throughout) present  No signs of injury  Right lower leg: Edema present  Left lower leg: Edema present  Skin:     General: Skin is dry  Findings: Bruising present  Neurological:      GCS: GCS eye subscore is 4  GCS verbal subscore is 4  GCS motor subscore is 6  Laboratory and Diagnostics:  Results from last 7 days   Lab Units 02/02/21  1652 02/02/21  0457 02/01/21  0418 01/31/21  0443 01/31/21  0027 01/30/21  0350 01/29/21  1746 01/29/21  1156  01/29/21  0547 01/27/21  0545   WBC Thousand/uL 7 88 7 25 6 13 7 15 10 77* 4 42  --  5 19  --  4 92 4 44   HEMOGLOBIN g/dL 10 9* 10 4* 9 1* 9 5* 11 0* 8 8*  --  10 1*  --  10 2* 9 2*   I STAT HEMOGLOBIN g/dl  --   --   --   --   --   --  8 5*  --    < >  --   --    HEMATOCRIT % 34 0* 33 4* 29 1* 29 6* 34 7* 27 8*  --  31 3*  --  32 1* 28 0*   HEMATOCRIT, ISTAT %  --   --   --   --   --   --  25*  --    < >  --   --    PLATELETS Thousands/uL 229 236 236 235 325 216  --  251  --  260 230   NEUTROS PCT % 43 49  --   --   --  55  --   --   --  49 67   BANDS PCT %  --   --   --   --  4  --   --   --   --   --   --    MONOS PCT % 8 10  --   --   --  10  --   --   --  14* 13*   MONO PCT %  --   --   --   --  8  --   --   --   --   --   --     < > = values in this interval not displayed       Results from last 7 days   Lab Units 02/02/21 1652 02/02/21 0457 02/01/21 0418 01/31/21  8249 01/31/21  0027 01/30/21  0350 01/29/21  1746 01/29/21  1156   SODIUM mmol/L 137 137 140 138 135* 136  --  133*   POTASSIUM mmol/L 3 8 3 6 3 6 4 0 4 2 3 6  --  4 1   CHLORIDE mmol/L 103 103 104 102 101 100  --  101   CO2 mmol/L 26 30 31 30 29 30  --  29   CO2, I-STAT mmol/L  --   --   --   --   --   --  33*  --    ANION GAP mmol/L 8 4 5 6 5 6  --  3*   BUN mg/dL 9 8 8 6 5 8  --  8   CREATININE mg/dL 0 59* 0 55* 0 59* 0 65 0 81 0 70  --  0 83   CALCIUM mg/dL 8 6 8 6 8 2* 8 2* 8 7 7 9*  --  9 3   GLUCOSE RANDOM mg/dL 121 104 100 125 139 87  --  99   ALT U/L  --   --   --   --  27 21  --   --    AST U/L  --   --   --   --  28 20  --   --    ALK PHOS U/L  --   --   --   --  79 61  --   --    ALBUMIN g/dL  --   --   --   --  2 9* 2 4*  --   --    TOTAL BILIRUBIN mg/dL  --   --   --   --  1 06* 0 91  --   --      Results from last 7 days   Lab Units 02/02/21 0457 02/01/21 0418 01/31/21  0605 01/31/21  0027 01/30/21  0350   MAGNESIUM mg/dL 1 8 2 0 1 8 2 0 2 0   PHOSPHORUS mg/dL 2 3 2 5 3 3 3 4 3 6      Results from last 7 days   Lab Units 01/29/21  1156   INR  2 89*   PTT seconds 56*      Results from last 7 days   Lab Units 02/02/21 1652 02/01/21 0419 01/31/21  0413 01/31/21  0027 01/29/21  1156   TROPONIN I ng/mL 0 06* 0 14* 0 33* 0 07* 0 02     Results from last 7 days   Lab Units 01/31/21  0604 01/31/21  0027   LACTIC ACID mmol/L 1 1 2 5*     ABG:  Results from last 7 days   Lab Units 01/30/21  0458   PH ART  7 477*   PCO2 ART mm Hg 39 9   PO2 ART mm Hg 157 5*   HCO3 ART mmol/L 28 8*   BASE EXC ART mmol/L 4 9   ABG SOURCE  Radial, Left     VBG:  Results from last 7 days   Lab Units 01/30/21  0458   ABG SOURCE  Radial, Left     Results from last 7 days   Lab Units 02/01/21  0612 02/01/21  0419 01/31/21  0616 01/30/21  1220   PROCALCITONIN ng/ml 0 12 0 07 <0 05 0 07       Micro  Results from last 7 days   Lab Units 01/30/21  1311 01/30/21  1155   BLOOD CULTURE  No Growth at 48 hrs  No Growth at 48 hrs  EKG:  Atrial fibrillation with rapid ventricular response  Imaging:  AP chest x-ray pending I have personally reviewed pertinent reports  and I have personally reviewed pertinent films in PACS    Intake and Output  I/O       01/31 0701 - 02/01 0700 02/01 0701 - 02/02 0700 02/02 0701 - 02/03 0700    P  O  120  0    I V  (mL/kg)       IV Piggyback 450 150     Total Intake(mL/kg) 570 (8 8) 150 (2 3) 0 (0)    Urine (mL/kg/hr) 1705 (1 1) 345 (0 2)     Stool 0      Total Output 1705 345     Net -1135 -195 0           Unmeasured Stool Occurrence 1 x          UOP:  Unmeasured ml/hr     Height and Weights      IBW: -92 5 kg  Body mass index is 29 95 kg/m²  Weight (last 2 days)     Date/Time   Weight    02/02/21 0600   65 (143 3)    02/01/21 0600   64 5 (142 2)    01/31/21 0600   65 (143 2)                Nutrition       Diet Orders   (From admission, onward)             Start     Ordered    01/31/21 1435  Dietary nutrition supplements  Once     Question Answer Comment   Select Supplement: Magic Cup Chocolate    Frequency Dinner        01/31/21 1434    01/31/21 1435  Dietary nutrition supplements  Once     Question Answer Comment   Select Supplement: MightyShake    Frequency Breakfast, Lunch, Dinner        01/31/21 1434    01/31/21 1434  Dietary nutrition supplements  Once     Question Answer Comment   Select Supplement: Magic Cup [place]    Frequency Lunch        01/31/21 1434    01/30/21 1614  Diet Cardiovascular; Cardiac; Dysphagia 1-Pureed; Nectar Thick Liquid  Diet effective now     Comments: Feed patient only when fully upright and alert   Question Answer Comment   Diet Type Cardiovascular    Cardiac Cardiac    Other Restriction(s): Dysphagia 1-Pureed    Liquid Modifier Nectar Thick Liquid    RD to adjust diet per protocol?  Yes        01/30/21 1613              Active Medications  Scheduled Meds:  Current Facility-Administered Medications   Medication Dose Route Frequency Provider Last Rate    acetaminophen  650 mg Oral Q6H PRN Henry Rivera PA-C      amiodarone  1 mg/min Intravenous Continuous Alexis Ferguson MD 1 mg/min (02/02/21 1709)    atorvastatin  20 mg Oral Daily With Dexter Hubkick, CARLY      cefdinir  300 mg Oral Q12H [facility] Henry Rivera PA-C      furosemide  40 mg Oral Daily Henry Rivera PA-C      hydrALAZINE  5 mg Intravenous Q6H PRN Henry Rivera PA-C      levETIRAcetam  500 mg Oral Q12H [facility] Rafael Lobato PA-C      lidocaine (PF)  5 mL Infiltration Once Henry Rivera PA-C      metoprolol  5 mg Intravenous Once Kumar Alonzo PA-C      metroNIDAZOLE  500 mg Oral [facility] Henry Rivera PA-C      ondansetron  4 mg Intravenous Q6H PRN Henry Rivera PA-C      oxyCODONE  2 5 mg Oral Q4H PRN Henry Rivera PA-C      potassium chloride  20 mEq Oral Once KATHY Ortiz      prasugrel  10 mg Oral Daily Henry Rivera PA-C      psyllium  1 packet Oral Daily Henry Rivera PA-C      rivaroxaban  15 mg Oral Daily With Dexter Bojorquez PA-C      saccharomyces boulardii  250 mg Oral BID Rafael Lobato PA-C       Continuous Infusions:  amiodarone, 1 mg/min, Last Rate: 1 mg/min (02/02/21 1709)      PRN Meds:   acetaminophen, 650 mg, Q6H PRN  hydrALAZINE, 5 mg, Q6H PRN  ondansetron, 4 mg, Q6H PRN  oxyCODONE, 2 5 mg, Q4H PRN        Allergies   Allergies   Allergen Reactions    Sulfa Antibiotics Anaphylaxis    Formaldehyde     Codeine Palpitations     ---------------------------------------------------------------------------------------  Advance Directive and Living Will:      Power of :    POLST:    ---------------------------------------------------------------------------------------  Care Time Delivered:   No Critical Care time spent     KATHY Ortiz      Portions of the record may have been created with voice recognition software    Occasional wrong word or "sound a like" substitutions may have occurred due to the inherent limitations of voice recognition software    Read the chart carefully and recognize, using context, where substitutions have occurred

## 2021-02-02 NOTE — RAPID RESPONSE
Progress Note - Rapid Response   Joon Mcdowell 79 y o  female MRN: [id number]    Time Called ( Time): 1630  Date Called: 2/2/2021  Level of Care: MS  Room#: 100  [id number] Time ( Time): 2642  Event End Time ( Time): 7620  Primary reason for call: Acute change in HR  Interventions:  Airway/Breathing:  O2 Mask/Nasal  Circulation: EKG  Other Treatments: Amiodarone push, IV access, metoprolol       Assessment:   1  Atrial fibrillation with rapid ventricular response  2  Chest pain    Plan:   · Will start amiodarone infusion, check electrolytes, one time dose of Lopressor  · Trend troponin, EKG concerning for ST changes, stat consult to cardiology       HPI/Chief Complaint (Background/Situation):   Joon Mcdowell is a 79y o  year old female who presents on 1/7 to the [facility] with chest pain  She has a past medical history of hypertension, CAD with history of stenting, heart failure, HLD, GERD, and Sjogren's  She underwent a cardiac cath with placement of 2 stents  She was transferred to the [facility] for placement of a pacemaker after development of atrial fibrillation  She had it placed on 1/19  She was discharged on 1/21 to [facility]  She was a rapid response on 1/29 for altered mental status with suspicion for seizure or stroke  Her EEG was negative for seizure, however there was concern for aspiration pneumonia and she was started on IV antibiotics  She was transferred out of the ICU on 2/1  Today, she complained of malaise, was found to be tachycardic to the 160s-200s and complained of chest pressure          Historical Information   Past Medical History:   Diagnosis Date    Anal fissure     Cardiac disorder     Cognitive changes 12/23/2020    Esophageal reflux     Esophagitis, reflux     Hemorrhoids     Hepatic hemangioma     Last Assessed: 1/13/2015    Herpes zoster     History of colonic polyps     Hypertension     Ischemic colitis ([facility] )     Lumbar herniated disc     Malignant neoplasm without specification of site ([facility] )     Nephrolithiasis     L  Lithotripsy    Nontoxic single thyroid nodule     Last Assessed: 1/13/2015    Osteoarthritis     Overactive bladder     Raynaud disease     Respiratory system disease     Sjogren's disease ([facility] )     Spinal stenosis     PONCHO (stress urinary incontinence, female)     Uterovaginal prolapse     Grade I-II     Past Surgical History:   Procedure Laterality Date    APPENDECTOMY  1947    CARDIAC SURGERY      CABG    CATARACT EXTRACTION Bilateral     COLONOSCOPY  2012    Fiberoptic    COLONOSCOPY      Resolved: 2006 - 2012 5 year f/u    CORONARY ANGIOPLASTY WITH STENT PLACEMENT      CORONARY ARTERY BYPASS GRAFT      Resolved: 2012    ESOPHAGOGASTRODUODENOSCOPY  2012    Diagnostic    HEMORROIDECTOMY      KNEE SURGERY      LITHOTRIPSY      Renal    MALIGNANT SKIN LESION EXCISION      Face; Resolved: 2004    MN ESOPHAGOGASTRODUODENOSCOPY TRANSORAL DIAGNOSTIC N/A 4/13/2016    Procedure: EGD AND COLONOSCOPY;  Surgeon: Shruthi Kaminski MD;  [facility]: [facility];   Service: Gastroenterology    RENAL ARTERY STENT      SKIN LESION EXCISION      Scalp    SOFT TISSUE TUMOR RESECTION      Shoulder; Resolved: 1995    THROMBOLYSIS      Postoperative Thrombolysis PTCA    TONSILLECTOMY      Resolved: 1944     Social History   Social History     Substance and Sexual Activity   Alcohol Use Not Currently    Frequency: Monthly or less    Comment: social     Social History     Substance and Sexual Activity   Drug Use No     Social History     Tobacco Use   Smoking Status Never Smoker   Smokeless Tobacco Never Used     Family History: non-contributory    Meds/Allergies   Current Facility-Administered Medications   Medication Dose Route Frequency Provider Last Rate    acetaminophen  650 mg Oral Q6H PRN Fanny Box PA-C      amiodarone  1 mg/min Intravenous Continuous Thanh Shirley MD      atorvastatin  20 mg Oral Daily With [email or web address] Nigel Castillo, PA-C      cefdinir  300 mg Oral Q12H [address] Nigelhenri Castillo, PA-C      furosemide  40 mg Oral Daily Nigelhenri Castillo, PA-C      hydrALAZINE  5 mg Intravenous Q6H PRN Nigelhenri Castillo, PA-C      levETIRAcetam  500 mg Oral Q12H [address] Chinedu Lobato, CARLY      lidocaine (PF)  5 mL Infiltration Once Nigel Castillo, PA-C      metoprolol  5 mg Intravenous Once Dacia Catching, CRNP      metroNIDAZOLE  500 mg Oral [facility] Nigel Castillo, PA-C      ondansetron  4 mg Intravenous Q6H PRN Nigel Castillo, PA-C      oxyCODONE  2 5 mg Oral Q4H PRN Nigel Castillo, PA-C      prasugrel  10 mg Oral Daily Nigelhenri Castillo, PA-C      psyllium  1 packet Oral Daily Nigelhenri Castillo, PA-C      rivaroxaban  15 mg Oral Daily With [employer], CARLY      saccharomyces boulardii  250 mg Oral BID Chinedu Lobato, PA-KATHIE         amiodarone, 1 mg/min        Allergies   Allergen Reactions    Sulfa Antibiotics Anaphylaxis    Formaldehyde     Codeine Palpitations       ROS: General ROS: positive for  - malaise  Respiratory ROS: positive for - shortness of breath  Cardiovascular ROS: positive for - chest pain  Musculoskeletal ROS: positive for - pain in areas attempting IV access    Vitals:   Vitals:    02/02/21 1654   BP: 137/63   Pulse: (!) 134   Resp: 15   Temp:    SpO2: 98%     Physical Exam:  Gen: Disoriented, frail, ill-appearing  HEENT: Atraumatic, PERRL  Neck: Conjugate gaze, no JVD  Chest: Lungs diminished bilaterally  Cor: Tachycardic, regular rhythm  Abd: Soft, non-distended  Ext: Slight edema in all extremities, no injuries  Neuro: GCS 14  Skin: Cool, dry      Intake/Output Summary (Last 24 hours) at 2/2/2021 1703  Last data filed at 2/2/2021 1245  Gross per 24 hour   Intake 0 ml   Output --   Net 0 ml       Respiratory    Lab Data (Last 4 hours)    None         O2/Vent Data (Last 4 hours)    None              Invasive Devices     Peripheral Intravenous Line Peripheral IV 02/01/21 Left;Ventral (anterior) Hand 1 day    Peripheral IV 02/02/21 Right less than 1 day    Peripheral IV 02/02/21 Right Forearm less than 1 day          Drain            Urethral Catheter Other (Comment) 16 Fr  10 days                DIAGNOSTIC DATA:    Lab: I have personally reviewed pertinent lab results  CBC:   Results from last 7 days   Lab Units 02/02/21  0457   WBC Thousand/uL 7 25   HEMOGLOBIN g/dL 10 4*   HEMATOCRIT % 33 4*   PLATELETS Thousands/uL 236     CMP:   Results from last 7 days   Lab Units 02/02/21  0457 02/01/21  0418 01/31/21  0605 01/31/21  0027 01/30/21  0350 01/29/21  1746  01/29/21  1146   POTASSIUM mmol/L 3 6 3 6 4 0 4 2 3 6  --    < >  --    CHLORIDE mmol/L 103 104 102 101 100  --    < >  --    CO2 mmol/L 30 31 30 29 30  --    < >  --    CO2, I-STAT mmol/L  --   --   --   --   --  33*  --  30   BUN mg/dL 8 8 6 5 8  --    < >  --    CREATININE mg/dL 0 55* 0 59* 0 65 0 81 0 70  --    < >  --    CALCIUM mg/dL 8 6 8 2* 8 2* 8 7 7 9*  --    < >  --    ALK PHOS U/L  --   --   --  79 61  --   --   --    ALT U/L  --   --   --  27 21  --   --   --    AST U/L  --   --   --  28 20  --   --   --    GLUCOSE, ISTAT mg/dl  --   --   --   --   --  106  --  105    < > = values in this interval not displayed  PT/INR:   No results found for: PT, INR,   Magnesium: No components found for: MAG,   Phosphorous:   Lab Results   Component Value Date    PHOS 2 3 02/02/2021       Microbiology:  Lab Results   Component Value Date    BLOODCX No Growth at 48 hrs  01/30/2021    BLOODCX No Growth at 48 hrs  01/30/2021    BLOODCX No Growth After 5 Days   01/13/2021    URINECX 60,000-69,000 cfu/ml Candida albicans (A) 01/22/2021    URINECX <10,000 cfu/ml Enterococcus  faecium VRE (A) 01/22/2021    URINECX No Growth <1000 cfu/mL 01/14/2021         OUTCOME:   Transferred to Critical Care Unit  Family notified of transfer: yes  Family member contacted: spouse  Code Status: Level 3 - DNAR and DNI  Critical Care Time: Total Critical Care time spent   30 minutes excluding procedures, teaching and family updates

## 2021-02-02 NOTE — ACP (ADVANCE CARE PLANNING)
Rapid response called to 531, critical care team responded  Patient found to be in rapid AFib with SBP in the 70s  Called daughter Samantha Persaud and  Siobhan Schaffer to discuss code status  Both Siobhan Schaffer and Samantha Persaud expressed that they do not believe it is in [facility] wishes to perform CPR or to place patient on a ventilator if need be  I discussed cardioversion with them, and again they do not believe this is what Kandy would want  In the meantime, we will continue with pharmacotherapy for rapid AFib including amiodarone bolus and drip  Patient code status updated to Level 3 DNR/DNI       Family meeting time: 9 minutes

## 2021-02-02 NOTE — PROGRESS NOTES
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, recent PE, recent pneumonia treated with antibiotics she was  was a stroke alert on 1/29/2021 for AMS was noted to have a right gaze pref  Patient was found to be obtunded after receiving ativan and keppra and was upgraded to icu  · Impression- Patient has had 2 episodes of AMS with generalized shaking, right gaze preference,  unresponsiveness  Unclear cause of her seizure-like episodes  · Plan    · 2/1/2021: Routine EEG: This is an abnormal 29 minutes awake and drowsy EEG due to intermittent diffuse delta activity and left temporal delta activity  These findings may indicate mild diffuse cerebral dysfunction with superimposed left temporal focal cerebral dysfunction    · Recommend Continuing Keppra 500 mg b i d   · Once able to get MRI brain, would recommend obtaining Mri with and without, okay to obtain as outpatient  · Continue seizure precautions  · Page Neurology for any abnormal/seziure like movements  · No further inpatient recommendations, please call/text for questions   · Patient currently drives, will send out pen dot form and patient counseled on need to stop driving  · Will schedule outpatient appointment with Neurology     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[facility])  Assessment & Plan  · Pacemaker placed  on 1/19/2021  · Management per primary team     A-fib [facility])  Assessment & Plan  · Recently diagnosed with a new onset AFib  · Continue Xarelto    Essential hypertension  Assessment & Plan  · Management per primary team      River Mata will need follow up in in 6 weeks with general attending or advance practitioner    She will not require outpatient neurological testing  SUBJECTIVE     Patient was seen and examined  No acute events overnight  She is sitting in her bed comfortably  She was eating her breakfast    Pertinent Negatives include: numbness, weakness, speech or visual changes, headaches, migraine headaches, syncope, seizures, amaurosis, diplopia, other visual changes, paralysis/weakness, numbness or tingling, involuntary movements, tremor     Review of Systems   Constitutional: Negative  Negative for activity change, appetite change, chills and fever  HENT: Negative  Negative for ear pain, facial swelling, hearing loss, sore throat, trouble swallowing and voice change  Eyes: Negative  Negative for pain, discharge, itching and visual disturbance  Respiratory: Negative  Negative for cough, chest tightness and shortness of breath  Cardiovascular: Negative for chest pain and palpitations  Gastrointestinal: Negative for abdominal pain and vomiting  Genitourinary: Negative for dysuria and hematuria  Musculoskeletal: Negative for arthralgias and back pain  Skin: Negative for color change and rash  Neurological: Negative  Negative for dizziness, tremors, seizures, syncope, facial asymmetry, speech difficulty, weakness, light-headedness, numbness and headaches  All other systems reviewed and are negative  OBJECTIVE     Patient ID: Rosie Conner is a 79 y o  female  Vitals:    21 2235 21 0134 21 0600 21 0741   BP: 106/64   106/66   Pulse: 65 94  92   Resp: 16      Temp: 99 1 °F (37 3 °C) 98 4 °F (36 9 °C)  98 7 °F (37 1 °C)   TempSrc:       SpO2: 90% 91%  (!) 89%   Weight:   65 kg (143 lb 4 8 oz)       Temperature:   Temp (24hrs), Av 9 °F (37 2 °C), Min:98 4 °F (36 9 °C), Max:99 2 °F (37 3 °C)    Temperature: 98 7 °F (37 1 °C)      Physical Exam  Vitals signs and nursing note reviewed  Constitutional:       Appearance: Normal appearance  She is well-developed  HENT:      Head: Normocephalic and atraumatic  Nose: Nose normal       Mouth/Throat:      Mouth: Mucous membranes are dry  Eyes:      General: No scleral icterus  Right eye: No discharge  Left eye: No discharge  Extraocular Movements: Extraocular movements intact  Pupils: Pupils are equal, round, and reactive to light  Neck:      Musculoskeletal: Normal range of motion  No neck rigidity  Pulmonary:      Effort: No respiratory distress  Breath sounds: Normal breath sounds  Abdominal:      General: There is no distension  Tenderness: There is no abdominal tenderness  Musculoskeletal: Normal range of motion  General: No tenderness or deformity  Skin:     General: Skin is warm and dry  Coloration: Skin is not jaundiced or pale  Findings: No bruising, erythema, lesion or rash  Neurological:      Mental Status: She is alert and oriented to person, place, and time  Coordination: Finger-Nose-Finger Test and Heel to [facility] Test normal       Deep Tendon Reflexes: Strength normal    Psychiatric:         Speech: Speech normal           Neurologic Exam     Mental Status   Oriented to person, place, and time  Follows 2 step commands  Attention: normal    Speech: speech is normal   Level of consciousness: alert  Able to name object  Able to repeat  Patient is able to start her age, year, month currently president  She was able to correctly follow 2 step commands  There was no left/right  confusion       Cranial Nerves   Cranial nerves II through XII intact  CN II   Visual fields full to confrontation  CN III, IV, VI   Pupils are equal, round, and reactive to light  Nystagmus: none   Diplopia: none  Conjugate gaze: present    CN V   Facial sensation intact       CN XI   CN XI normal      CN XII   CN XII normal    Tongue: not atrophic  Fasciculations: absent  Tongue deviation: none    Motor Exam   Muscle bulk: normal  Overall muscle tone: normal  Right arm pronator drift: absent  Left arm pronator drift: absent    Strength   Strength 5/5 throughout  Right deltoid: 5/5  Left deltoid: 5/5  Right biceps: 5/5  Left biceps: 5/5  Right triceps: 5/5  Left triceps: 5/5  Right hamstrin/5  Left hamstrin/5  Right peroneal: 5/5  Left peroneal: 5/5  Right gastroc: 5/5  Left gastroc: 5/5    Sensory Exam   Light touch normal      Gait, Coordination, and Reflexes     Coordination   Finger to nose coordination: normal  Heel to shin coordination: normal    Tremor   Resting tremor: absent  Intention tremor: absent  Action tremor: absent    Reflexes   Reflexes 2+ except as noted  Right plantar: normal  Left plantar: normal  Right ankle clonus: absent  Left ankle clonus: absent       LABORATORY DATA     Labs: I have personally reviewed pertinent reports  Results from last 7 days   Lab Units 21  0027 21  0350  21  0547   WBC Thousand/uL 7 25 6 13 7 15 10 77* 4 42   < > 4 92   HEMOGLOBIN g/dL 10 4* 9 1* 9 5* 11 0* 8 8*   < > 10 2*   I STAT HEMOGLOBIN   --   --   --   --   --    < >  --    HEMATOCRIT % 33 4* 29 1* 29 6* 34 7* 27 8*   < > 32 1*   HEMATOCRIT, ISTAT   --   --   --   --   --    < >  --    PLATELETS Thousands/uL 236 236 235 325 216   < > 260   NEUTROS PCT % 49  --   --   --  55  --  49   MONOS PCT % 10  --   --   --  10  --  14*   MONO PCT %  --   --   --  8  --   --   --     < > = values in this interval not displayed        Results from last 7 days   Lab Units 21  0605 21  00221  0350 21  1746  21  1146   SODIUM mmol/L 137 140 138 135* 136  --    < >  --    POTASSIUM mmol/L 3 6 3 6 4 0 4 2 3 6  --    < >  --    CHLORIDE mmol/L 103 104 102 101 100  --    < >  --    CO2 mmol/L  30  --    < >  --    CO2, I-STAT mmol/L  --   --   --   --   --  33*  --  30   BUN mg/dL 8 8 6 5 8  --    < >  --    CREATININE mg/dL 0 55* 0 59* 0 65 0 81 0 70  --    < >  --    CALCIUM mg/dL 8 6 8 2* 8 2* 8 7 7 9*  --    < >  --    ALK PHOS U/L  --   --   --  79 61  --   --   --    ALT U/L  --   --   --  27 21  --   --   --    AST U/L  --   --   --  28 20  --   --   --    GLUCOSE, ISTAT mg/dl  --   --   --   --   --  106  --  105    < > = values in this interval not displayed  Results from last 7 days   Lab Units 02/02/21  0457 02/01/21  0418 01/31/21  0605   MAGNESIUM mg/dL 1 8 2 0 1 8     Results from last 7 days   Lab Units 02/02/21  0457 02/01/21  0418 01/31/21  0605   PHOSPHORUS mg/dL 2 3 2 5 3 3      Results from last 7 days   Lab Units 01/29/21  1156   INR  2 89*   PTT seconds 56*     Results from last 7 days   Lab Units 01/31/21  0604   LACTIC ACID mmol/L 1 1     Results from last 7 days   Lab Units 02/01/21  0419 01/31/21  0413 01/31/21  0027   TROPONIN I ng/mL 0 14* 0 33* 0 07*       IMAGING & DIAGNOSTIC TESTING     Radiology Results: I have personally reviewed pertinent reports  XR chest portable ICU   Final Result by Chester Storey MD (01/31 1332)      Improving bilateral airspace opacities consistent with pulmonary edema or pneumonia  Bilateral loculated pleural effusions  No new abnormalities  Workstation performed: [id number]         XR chest portable   Final Result by Chester Storey MD (01/31 1336)      New bilateral airspace opacities with pleural effusions and vascular congestion consistent with CHF and pulmonary edema  The study was marked in [facility] for immediate notification  Workstation performed: [id number]             Other Diagnostic Testing: I have personally reviewed pertinent reports        ACTIVE MEDICATIONS     Current Facility-Administered Medications   Medication Dose Route Frequency    acetaminophen (TYLENOL) tablet 650 mg  650 mg Oral Q6H PRN    atorvastatin (LIPITOR) tablet 20 mg  20 mg Oral Daily With Dinner    cefdinir (OMNICEF) capsule 300 mg  300 mg Oral Q12H [address]    furosemide (LASIX) tablet 40 mg  40 mg Oral Daily    hydrALAZINE (APRESOLINE) injection 5 mg  5 mg Intravenous Q6H PRN    levETIRAcetam (KEPPRA) tablet 500 mg  500 mg Oral Q12H [address]    lidocaine (PF) (XYLOCAINE-MPF) 1 % injection 5 mL  5 mL Infiltration Once    metroNIDAZOLE (FLAGYL) tablet 500 mg  500 mg Oral Q8H [address]    ondansetron (ZOFRAN) injection 4 mg  4 mg Intravenous Q6H PRN    oxyCODONE (ROXICODONE) IR tablet 2 5 mg  2 5 mg Oral Q4H PRN    prasugrel (EFFIENT) tablet 10 mg  10 mg Oral Daily    psyllium (METAMUCIL) 1 packet  1 packet Oral Daily    rivaroxaban (XARELTO) tablet 15 mg  15 mg Oral Daily With Dinner    saccharomyces boulardii (FLORASTOR) capsule 250 mg  250 mg Oral BID       Prior to Admission medications    Medication Sig Start Date End Date Taking? Authorizing Provider   amLODIPine (NORVASC) 10 mg tablet Take 1 tablet (10 mg total) by mouth daily 12/24/20   Sumit Lawson PA-C   apixaban (ELIQUIS) 5 mg Take 1 tablet (5 mg total) by mouth 2 (two) times a day 1/18/21   Anjelica Lozano PA-C   aspirin (ECOTRIN LOW STRENGTH) 81 mg EC tablet Take 81 mg by mouth daily  Historical Provider, MD   atorvastatin (LIPITOR) 20 mg tablet Take 1 tablet (20 mg total) by mouth daily with dinner 12/5/20   Leighann Chol, DO   docusate sodium (Colace) 100 mg capsule Take 1 capsule (100 mg total) by mouth 2 (two) times a day 12/5/20   Leighann Chol, DO   furosemide (LASIX) 40 mg tablet Take 1 tablet (40 mg total) by mouth daily 12/6/20   Leighann Chol, DO   isosorbide mononitrate (IMDUR) 30 mg 24 hr tablet Take 3 tablets (90 mg total) by mouth daily 1/7/21   Allison Villafana MD   metoprolol succinate (TOPROL-XL) 50 mg 24 hr tablet Take 1 tablet (50 mg total) by mouth daily 1/7/21   Allison Villafana MD   multivitamin [facility]) TABS Take 1 tablet by mouth daily      Historical Provider, MD   nitroglycerin (NITROSTAT) 0 4 mg SL tablet PLACE 1 TABLET (0 4 MG TOTAL) UNDER THE TONGUE EVERY 5 (FIVE) MINUTES AS NEEDED FOR CHEST PAIN 1/6/21   Loraine Rhoades MD   pantoprazole (PROTONIX) 40 mg tablet Take 1 tablet (40 mg total) by mouth daily in the early morning 12/24/20   Christiano Lambert PA-C   polyethylene glycol (MIRALAX) 17 g packet Take 17 g by mouth daily 12/6/20   Honorio Todd DO   prasugrel (EFFIENT) tablet TAKE 1 TABLET EVERY OTHER DAY 12/27/20   Loraine Rhoades MD   ranolazine (RANEXA) 1000 MG SR tablet Take 1,000 mg by mouth 2 (two) times a day    Historical Provider, MD   rivaroxaban (XARELTO) 20 mg tablet Take 1 tablet (20 mg total) by mouth daily with breakfast 1/18/21   Anjelica Lozano PA-C   senna (SENOKOT) 8 6 mg Take 1 tablet (8 6 mg total) by mouth daily at bedtime 12/5/20   Honorio Todd DO         VTE Pharmacologic Prophylaxis: Rivaroxaban (Xarelto)  VTE Mechanical Prophylaxis: sequential compression device    ==  MD Zack Packer's Neurology Residency, PGY-2

## 2021-02-02 NOTE — OCCUPATIONAL THERAPY NOTE
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[facility] )    Dysphagia    Urinary retention    Hyperlipidemia    Toxic metabolic encephalopathy    Aspiration pneumonia [facility])    Past Medical History  Past Medical History:   Diagnosis Date    Anal fissure     Cardiac disorder     Cognitive changes 12/23/2020    Esophageal reflux     Esophagitis, reflux     Hemorrhoids     Hepatic hemangioma     Last Assessed: 1/13/2015    Herpes zoster     History of colonic polyps     Hypertension     Ischemic colitis ([facility] )     Lumbar herniated disc     Malignant neoplasm without specification of site ([place] [address] )     Nephrolithiasis     L  Lithotripsy    Nontoxic single thyroid nodule     Last Assessed: 1/13/2015    Osteoarthritis     Overactive bladder     Raynaud disease     Respiratory system disease     Sjogren's disease ([facility] )     Spinal stenosis     PONCHO (stress urinary incontinence, female)     Uterovaginal prolapse     Grade I-II     Past Surgical History  Past Surgical History:   Procedure Laterality Date    APPENDECTOMY  1947    CARDIAC SURGERY      CABG    CATARACT EXTRACTION Bilateral     COLONOSCOPY  2012    Fiberoptic    COLONOSCOPY      Resolved: 2006 - 2012 5 year f/u    CORONARY ANGIOPLASTY WITH STENT PLACEMENT      CORONARY ARTERY BYPASS GRAFT      Resolved: 2012    ESOPHAGOGASTRODUODENOSCOPY  2012    Diagnostic    HEMORROIDECTOMY      KNEE SURGERY      LITHOTRIPSY      Renal    MALIGNANT SKIN LESION EXCISION      Face; Resolved: 2004    MN ESOPHAGOGASTRODUODENOSCOPY TRANSORAL DIAGNOSTIC N/A 4/13/2016    Procedure: EGD AND COLONOSCOPY;  Surgeon: Georgi Chu MD;  [facility];   Service: Gastroenterology    RENAL ARTERY STENT      SKIN LESION EXCISION      Scalp    SOFT TISSUE TUMOR RESECTION      Shoulder; Resolved: 1995    THROMBOLYSIS      Postoperative Thrombolysis PTCA    TONSILLECTOMY      Resolved: 1944 02/02/21 1005   OT Last Visit   OT Visit Date 02/02/21   Note Type   Note type Evaluation   Restrictions/Precautions   Weight Bearing Precautions Per Order No   Other Precautions Cognitive; Chair Alarm;Multiple lines;Telemetry  (chair alarm active at end of session)   Pain Assessment   Pain Assessment Tool Pain Assessment not indicated - pt denies pain   Pain Score No Pain   Home Living   Type of [address] One level   Bathroom Shower/Tub Tub/shower unit   Bathroom Toilet Standard   Bathroom Equipment Shower chair;Grab bars in shower   Home Equipment Walker;Cane   Additional Comments admit from [facility]   Prior Function   Comments admit from [place] "I am tired today"    Reciprocal Relationships supportive family    Service to Others retired   Intrinsic Gratification enjoys [address] [place] [address] [place] ([address]) X   Patient Behaviors/Mood   (distracted)   Subjective   Subjective "I never had a seizure, I just came to the hospital"- did not recall ARC   ADL   Where [address] 5  Supervision/Setup   Grooming Assistance 5  Supervision/Setup   UB Bathing Assistance 4  Minimal Assistance   LB Bathing Assistance 3  Moderate Assistance   [address] 4  Minimal Assistance    [facility] [address] 3  Moderate [address]  3  Moderate Assistance   Transfers   Sit to Stand 3  Moderate assistance   Additional items Assist x 1; Increased time required   Stand to Sit 3  Moderate assistance   Additional items Assist x 1; Increased time required   Functional Mobility   Functional Mobility 3  Moderate assistance   Additional items Hand hold assistance   Balance   Static Sitting Fair   Dynamic Sitting Fair -   Static Standing Poor +   Dynamic Standing Poor   Ambulatory Poor   Activity Tolerance   Activity Tolerance Patient limited by fatigue   Nurse Made Aware okay to see per RN   RUE Assessment   RUE Assessment WFL   LUE Assessment   LUE Assessment WFL   Hand Function   Gross Motor Coordination Functional   Fine Motor Coordination Functional   Cognition   Overall Cognitive Status Impaired   Arousal/Participation Cooperative   Attention Attends with cues to redirect   Orientation Level Oriented to person;Oriented to place; Disoriented to situation;Disoriented to time  (able to recall month and year)   Memory Decreased recall of precautions;Decreased recall of recent events   Following Commands Follows one step commands with increased time or repetition   Comments unable to recall time spent at [facility]- believes she was admitted from home; cannot state exactly why she is here just that "someone told me i had a seizure";  able to correctly identify president   Assessment   Limitation Decreased ADL status; Decreased endurance;Decreased self-care trans;Decreased high-level ADLs   Prognosis Good   Assessment Pt is a 79 y o  YO  female admitted to [facility] on 1/29/2021 from [facility] w/ seizure-like activity, pt w/ 2 episodes of AMS w/ shaking, R gaze preference, and unresponsiveness  Pt  has a past medical history of Anal fissure, Cardiac disorder, Cognitive changes, Esophageal reflux, Esophagitis, reflux, Hemorrhoids, Hepatic hemangioma, Herpes zoster, History of colonic polyps, Hypertension, Ischemic colitis ([address] ), Lumbar herniated disc, Malignant neoplasm without specification of site ([address] ), Nephrolithiasis, Nontoxic single thyroid nodule, Osteoarthritis, Overactive bladder, Raynaud disease, Respiratory system disease, Sjogren's disease ([address] ), Spinal stenosis, PONCHO (stress urinary incontinence, female), and Uterovaginal prolapse  Pt with active OT orders and up with assistance  orders     Pt resides in a ranch style home with aspouse  At baseline, I for ADLs/IADLs/functional mobility  Currently pt is Min A for Ub ADLs, Mod A for LB ADLs, and Mod A for transfers/functional mobility  Pt is limited at this time 2*: endurance, activity tolerance, functional mobility, balance, functional standing tolerance, unsupportive home environment, decreased I w/ ADLS/IADLS, cognitive impairments, decreased safety awareness and decreased insight into deficits  The following Occupational Performance Areas to address include: grooming, bathing/shower, toilet hygiene, dressing and functional mobility  Based on the aforementioned OT evaluation, functional performance deficits, and assessments, pt has been identified as a high complexity evaluation  From OT standpoint, anticipate d/c STR  Pt to continue to benefit from acute immediate OT services to address the following goals 3-5x/week to  w/in 10-14 days: The patient's raw score on the AM-PAC Daily Activity inpatient short form is 16, standardized score is 35 96, less than 39 4  Patients at this level are likely to benefit from DC to post-acute rehabilitation services  Please refer to the recommendation of the Occupational Therapist for safe DC planning  Goals   Patient Goals go home   LTG Time Frame 10-   Plan   Treatment Interventions ADL retraining;Functional transfer training; Endurance training;Patient/family training; Compensatory technique education   Goal Expiration Date 21   OT Treatment Day 1   OT Frequency 3-5x/wk   Recommendation   OT Discharge Recommendation Post-Acute Rehabilitation Services   OT - OK to Discharge Yes   AM-PAC Daily Activity Inpatient   Lower Body Dressing 2   Bathing 2   Toileting 2   Upper Body Dressing 3   Grooming 3   Eating 4   Daily Activity Raw Score 16   Daily Activity Standardized Score (Calc for Raw Score >=11) 35 96   AM-PAC Applied Cognition Inpatient   Following a Speech/Presentation 2   Understanding Ordinary Conversation 3   Taking Medications 2   Remembering Where Things Are Placed or Put Away 2   Remembering List of 4-5 Errands 2   Taking Care of Complicated Tasks 2   Applied Cognition Raw Score 13   Applied Cognition Standardized Score 30 46   Modified [place] Scale   Modified [place] Scale 4     GOALS    1) Pt will increase activity tolerance to G for 30 min txment sessions    2) Pt will complete UB/LB dressing/self care w/ mod I using adaptive device and DME as needed    3) Pt will complete bathing w/ Mod I w/ use of AE and DME as needed    4) Pt will complete toileting w/ mod I w/ G hygiene/thoroughness using DME as needed    5) Pt will improve functional transfers to Mod I on/off all surfaces using DME as needed w/ G balance/safety     6) Pt will improve functional mobility during ADL/IADL/leisure tasks to Mod I using DME as needed w/ G balance/safety     7) Pt will participate in simulated IADL management task to increase independence to  w/ G safety and endurance    8) Pt will demonstrate G carryover of pt/caregiver education and training as appropriate  9) Pt will demonstrate 100% carryover of energy conservation techniques t/o functional I/ADL/leisure tasks w/o cues s/p skilled education    10) Pt will independently identify and utilize 2-3 coping strategies to increase positive affect and promote overall well-being      11) Pt will engage in ongoing cognitive assessment w/ G participation to assist w/ safe d/c planning/recommendations    Luz Charles MS, OTR/L

## 2021-02-02 NOTE — PROGRESS NOTES
Pastoral Care Progress Note    2021  Patient: Juany New : 1940  Admission Date & Time: 2021 1537  MRN: [id number] CSN: [id number]       responded to rapid response alert  No family present   Patient is being attended to by medical team        21 [id number]   Clinical Encounter Type   Visited With Patient not available   Crisis Visit Code

## 2021-02-02 NOTE — SPEECH THERAPY NOTE
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[facility] )    Dysphagia    Urinary retention    Hyperlipidemia    Toxic metabolic encephalopathy    Aspiration pneumonia [facility])       Past Medical History  Past Medical History:   Diagnosis Date    Anal fissure     Cardiac disorder     Cognitive changes 12/23/2020    Esophageal reflux     Esophagitis, reflux     Hemorrhoids     Hepatic hemangioma     Last Assessed: 1/13/2015    Herpes zoster     History of colonic polyps     Hypertension     Ischemic colitis ([place] [address] )     Lumbar herniated disc     Malignant neoplasm without specification of site ([place] [address] )     Nephrolithiasis     L   Lithotripsy    Nontoxic single thyroid nodule     Last Assessed: 1/13/2015    Osteoarthritis     Overactive bladder     Raynaud disease     Respiratory system disease     Sjogren's disease ([facility] )     Spinal stenosis     PONCHO (stress urinary incontinence, female)     Uterovaginal prolapse     Grade I-II        Past Surgical History  Past Surgical History:   Procedure Laterality Date    APPENDECTOMY  1947    CARDIAC SURGERY      CABG    CATARACT EXTRACTION Bilateral     COLONOSCOPY  2012    Fiberoptic    COLONOSCOPY      Resolved: 2006 - 2012 5 year f/u    CORONARY ANGIOPLASTY WITH STENT PLACEMENT      CORONARY ARTERY BYPASS GRAFT      Resolved: 2012    ESOPHAGOGASTRODUODENOSCOPY  2012    Diagnostic    HEMORROIDECTOMY      KNEE SURGERY      LITHOTRIPSY      Renal    MALIGNANT SKIN LESION EXCISION      Face; Resolved: 2004    DE ESOPHAGOGASTRODUODENOSCOPY TRANSORAL DIAGNOSTIC N/A 4/13/2016    Procedure: EGD AND COLONOSCOPY;  Surgeon: Bernardino Vitale MD; [facility]; Service: Gastroenterology    RENAL ARTERY STENT      SKIN LESION EXCISION      Scalp    SOFT TISSUE TUMOR RESECTION      Shoulder; Resolved: 1995    THROMBOLYSIS      Postoperative Thrombolysis PTCA    TONSILLECTOMY      Resolved: 1944         Subjective:  "I need to see my doctor  I haven't seen one since I've been here  Why did they put me here?" Patient confused, but awake and alert  Objective: The patient is seen for f/u dysphagia therapy  She continues on a puree diet with nectar thick liquids and reports dislike  She is agreeable to upgrades and states "I won't fall asleep anymore"  The patient is assessed with thin liquids via straw  Strength is adequate and the patient tolerates small, single sips without overt s/s aspiration  She is also agreeable to trial of hilario cracker with and without peanut butter  Bite strength is adequate  Mastication time is min prolonged, but efficient with no oral residue  The patient independently alternates liquids and solids throughout  She is eager to have diet upgrade  Assessment:  The patient tolerated upgraded trials well  Plan/Recommendations:  Recommend diet upgrade to regular with thin liquids  ST will f/u and assess as able

## 2021-02-03 ENCOUNTER — TRANSITIONAL CARE MANAGEMENT (OUTPATIENT)
Dept: FAMILY MEDICINE CLINIC | Facility: MEDICAL CENTER | Age: 81
End: 2021-02-03

## 2021-02-03 LAB
ATRIAL RATE: 69 BPM
GLUCOSE SERPL-MCNC: 118 MG/DL (ref 65–140)
GLUCOSE SERPL-MCNC: 129 MG/DL (ref 65–140)
GLUCOSE SERPL-MCNC: 163 MG/DL (ref 65–140)
GLUCOSE SERPL-MCNC: 96 MG/DL (ref 65–140)
P AXIS: 54 DEGREES
PR INTERVAL: 148 MS
QRS AXIS: 140 DEGREES
QRSD INTERVAL: 118 MS
QT INTERVAL: 488 MS
QTC INTERVAL: 522 MS
T WAVE AXIS: 126 DEGREES
TROPONIN I SERPL-MCNC: 0.06 NG/ML
VENTRICULAR RATE: 69 BPM

## 2021-02-03 PROCEDURE — 99024 POSTOP FOLLOW-UP VISIT: CPT | Performed by: INTERNAL MEDICINE

## 2021-02-03 PROCEDURE — NC001 PR NO CHARGE: Performed by: INTERNAL MEDICINE

## 2021-02-03 PROCEDURE — 93010 ELECTROCARDIOGRAM REPORT: CPT | Performed by: INTERNAL MEDICINE

## 2021-02-03 PROCEDURE — 82948 REAGENT STRIP/BLOOD GLUCOSE: CPT

## 2021-02-03 PROCEDURE — 84484 ASSAY OF TROPONIN QUANT: CPT | Performed by: NURSE PRACTITIONER

## 2021-02-03 PROCEDURE — NC001 PR NO CHARGE: Performed by: NURSE PRACTITIONER

## 2021-02-03 PROCEDURE — 93005 ELECTROCARDIOGRAM TRACING: CPT

## 2021-02-03 PROCEDURE — 99232 SBSQ HOSP IP/OBS MODERATE 35: CPT | Performed by: INTERNAL MEDICINE

## 2021-02-03 RX ORDER — POTASSIUM CHLORIDE 20 MEQ/1
20 TABLET, EXTENDED RELEASE ORAL ONCE
Status: COMPLETED | OUTPATIENT
Start: 2021-02-03 | End: 2021-02-03

## 2021-02-03 RX ORDER — FUROSEMIDE 10 MG/ML
40 INJECTION INTRAMUSCULAR; INTRAVENOUS DAILY
Status: DISCONTINUED | OUTPATIENT
Start: 2021-02-04 | End: 2021-02-03

## 2021-02-03 RX ORDER — SOTALOL HYDROCHLORIDE 120 MG/1
120 TABLET ORAL EVERY 12 HOURS SCHEDULED
Status: DISCONTINUED | OUTPATIENT
Start: 2021-02-03 | End: 2021-02-03

## 2021-02-03 RX ORDER — FUROSEMIDE 10 MG/ML
20 INJECTION INTRAMUSCULAR; INTRAVENOUS ONCE
Status: COMPLETED | OUTPATIENT
Start: 2021-02-03 | End: 2021-02-03

## 2021-02-03 RX ORDER — AMIODARONE HYDROCHLORIDE 200 MG/1
200 TABLET ORAL 2 TIMES DAILY WITH MEALS
Status: DISCONTINUED | OUTPATIENT
Start: 2021-02-03 | End: 2021-02-04

## 2021-02-03 RX ADMIN — Medication 250 MG: at 08:55

## 2021-02-03 RX ADMIN — Medication 250 MG: at 17:29

## 2021-02-03 RX ADMIN — AMIODARONE HYDROCHLORIDE 200 MG: 200 TABLET ORAL at 17:29

## 2021-02-03 RX ADMIN — LEVETIRACETAM 500 MG: 500 TABLET, FILM COATED ORAL at 08:55

## 2021-02-03 RX ADMIN — FUROSEMIDE 40 MG: 40 TABLET ORAL at 08:55

## 2021-02-03 RX ADMIN — Medication 1 PACKET: at 08:55

## 2021-02-03 RX ADMIN — POTASSIUM CHLORIDE 20 MEQ: 1500 TABLET, EXTENDED RELEASE ORAL at 12:28

## 2021-02-03 RX ADMIN — CEFDINIR 300 MG: 300 CAPSULE ORAL at 17:29

## 2021-02-03 RX ADMIN — FUROSEMIDE 20 MG: 10 INJECTION, SOLUTION INTRAMUSCULAR; INTRAVENOUS at 12:28

## 2021-02-03 RX ADMIN — ONDANSETRON 4 MG: 2 INJECTION INTRAMUSCULAR; INTRAVENOUS at 17:41

## 2021-02-03 RX ADMIN — CEFDINIR 300 MG: 300 CAPSULE ORAL at 06:14

## 2021-02-03 RX ADMIN — METRONIDAZOLE 500 MG: 500 TABLET ORAL at 13:58

## 2021-02-03 RX ADMIN — PRASUGREL HYDROCHLORIDE 10 MG: 10 TABLET, FILM COATED ORAL at 08:55

## 2021-02-03 RX ADMIN — PANTOPRAZOLE SODIUM 40 MG: 40 TABLET, DELAYED RELEASE ORAL at 06:14

## 2021-02-03 RX ADMIN — LEVETIRACETAM 500 MG: 500 TABLET, FILM COATED ORAL at 21:10

## 2021-02-03 RX ADMIN — ATORVASTATIN CALCIUM 20 MG: 20 TABLET, FILM COATED ORAL at 17:29

## 2021-02-03 RX ADMIN — AMIODARONE HYDROCHLORIDE 1 MG/MIN: 50 INJECTION, SOLUTION INTRAVENOUS at 10:37

## 2021-02-03 RX ADMIN — RIVAROXABAN 15 MG: 15 TABLET, FILM COATED ORAL at 17:29

## 2021-02-03 RX ADMIN — METRONIDAZOLE 500 MG: 500 TABLET ORAL at 06:14

## 2021-02-03 RX ADMIN — METRONIDAZOLE 500 MG: 500 TABLET ORAL at 21:10

## 2021-02-03 NOTE — PROGRESS NOTES
Pastoral Care Progress Note    2/3/2021  Patient: Rosie Conner : 1940  Admission Date & Time: 2021 1537  MRN: [id number] [facility]: [id number]                     Chaplaincy Interventions Utilized:   Empowerment: Encouraged self-care and Provided chaplaincy education    Exploration: Explored hope, Explored emotional needs & resources, Explored relational needs & resources, Explored spiritual needs & resources and Facilitated story telling    Collaboration: Consulted with interdisciplinary team and Facilitated respect for spiritual/cultural practice during hospitalization    Relationship Building: Cultivated a relationship of care and support and Listened empathically    Ritual: Provided prayer    Chaplaincy Outcomes Achieved:  Emotional resources utilized, Progressed toward focus on present, Progressed toward understanding, Tearfully processed emotions and Verbally processed emotions    Spiritual Coping Strategies Utilized:   Spiritual comfort and Spiritual empowerment       21 1400   Clinical Encounter Type   Visited With Patient; Health care provider   Routine Visit Introduction   Referral From [address] [place] [address]

## 2021-02-03 NOTE — CONSULTS
Consultation - Electrophysiology-Cardiology (EP)   [place] 79 y o  female MRN: [id number]  Unit/Bed#: [facility] 531-01 Encounter: [id number]      Inpatient consult to Cardiology  Consult performed by: Vernell Calabrese PA-C  Consult ordered by: KATHY Sanchez          Assessment/Plan   Primary diagnosis:   1  Seizure like activity    * patient readmitted to [facility] on 1-29 due to witnessed seizure like activity from [facility], EP consulted on 2-3 for atrial fibrillation w/RVR leading to rapid response    * EEG negative, patient maintained on keppra per Neuro recommendations     2  Aspiration PNA    * on IV flagyl and omnicef; afebrile, no leukocytosis    * further management per primary teams     3  Paroxysmal atrial fibrillation, symptomatic    * new diagnosis from last admission, had been on toprol XL only    * rapid response last night due to [facility] which is likely atrial fibrillation w/ RVR, now on IV amiodarone and in NSR    * on [facility] w/ xaretlo 15mg due to effient use from recent LAMINE    * electrolytes stable from labs on 2-2 during rapid, thyroid function WNL on 1-8-21    * LA size is mildly dilated with EF 50% on 2-d echo from 1-30-21   * given recurrent AF with RVR w/ associated hypotension recommend continued rhythm control, can use Sotalol 120mg BID as her CrCl is 77mL/min, would need to be cautious with recent hypotension/borderline hypotension (most recent SBP 118mmHg, will update now), would need recent ECG as she has not had one since 1-31 to assess QTc in V paced rhythm vs amiodarone loading (has recent normal CMP and TSH, no pulmonary fibrosis)    * discussed with attending, QTc when V paced WNL, will initiate Sotalol 120mg starting tonight, stop amio IV now, obtain baseline ECG now, discussed with RN    * agree with holding toprol XL to allow higher dose of Sotalol    * will have [employer] increase LR of device to 70bpm     4   Acute on chronic diastolic CHF   * CXR 2-2 showing mild vascular congestion, she had 40mg PO lasix today, will stop and give lasix IV 20mg x 1 and re-assess tomorrow for further IV vs PO dosing    * exacerbation likely from afib w/ RVR    * will give klor con 20mEq x 1 today       Secondary diagnosis:   1  Hx LBBB   2  HTN  3  Mod pulm HTN   4  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 -   5  B/l renal artery stenosis   6  HLD   7  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)      Cardiac Studies/Imaging:     Imaging: I have personally reviewed pertinent reports  ECHO:   Results for orders placed during the hospital encounter of 20   Echo complete with contrast if indicated    Narrative [address]  [facility], [address] [facility]  [phone]    Transthoracic Echocardiogram  2D, M-mode, Doppler, and Color Doppler    Study date:  03-Dec-2020    Patient: Ewa Kaur  MR number: [id number]  Account number: [de-identified]  : 1940  Age: 79 years  Gender: Female  Status: Inpatient  Location: Bedside  Height: 60 in  Weight: 136 lb  BP: 99/ 53 mmHg    Indications: Heart Failure    Diagnoses: I50 9 - Heart failure, unspecified    Sonographer:  Tamara Garland RDCS  Primary Physician:  Renetta Sykes MD  Referring Physician:  Yulissa Bauer DO  Group:  [facility]  Interpreting Physician:  Josesito Manrique MD    SUMMARY    LEFT VENTRICLE:  Systolic function was mildly reduced  Ejection fraction was estimated to be 45 %  There was mild diffuse hypokinesis  Wall thickness was mildly to moderately increased  There was mild concentric hypertrophy  Doppler parameters were consistent with abnormal left ventricular relaxation (grade 1 diastolic dysfunction)  VENTRICULAR SEPTUM:  There was paradoxical motion  These changes are consistent with LBBB  LEFT ATRIUM:  The atrium was mildly dilated  MITRAL VALVE:  There was mild to moderate regurgitation  TRICUSPID VALVE:  There was mild regurgitation  Estimated peak PA pressure was 55 mmHg    The findings suggest moderate pulmonary hypertension  PULMONIC VALVE:  There was trace regurgitation  HISTORY: PRIOR HISTORY: CAD, Hypertension, GERD, Hyperlipidemia, CP, Stent, CABG, Raynaud's    PROCEDURE: The procedure was performed at the bedside  This was a routine study  The transthoracic approach was used  The study included complete 2D imaging, M-mode, complete spectral Doppler, and color Doppler  The heart rate was 82 bpm,  at the start of the study  Images were obtained from the parasternal, apical, subcostal, and suprasternal notch acoustic windows  Intravenous contrast ( 0 6ml Definity in NSS) was administered to opacify the left ventricle  Echocardiographic views were limited due to decreased penetration and lung interference  This was a technically difficult study  LEFT VENTRICLE: Size was normal  Systolic function was mildly reduced  Ejection fraction was estimated to be 45 %  There was mild diffuse hypokinesis  Wall thickness was mildly to moderately increased  There was mild concentric  hypertrophy  DOPPLER: Doppler parameters were consistent with abnormal left ventricular relaxation (grade 1 diastolic dysfunction)  VENTRICULAR SEPTUM: There was paradoxical motion  These changes are consistent with LBBB  RIGHT VENTRICLE: The size was normal  Systolic function was normal  Wall thickness was normal     LEFT ATRIUM: The atrium was mildly dilated  RIGHT ATRIUM: Size was normal     MITRAL VALVE: Valve structure was normal  There was normal leaflet separation  DOPPLER: The transmitral velocity was within the normal range  There was no evidence for stenosis  There was mild to moderate regurgitation  AORTIC VALVE: The valve was trileaflet  Leaflets exhibited normal thickness and normal cuspal separation  DOPPLER: Transaortic velocity was within the normal range  There was no evidence for stenosis  There was no regurgitation      TRICUSPID VALVE: The valve structure was normal  There was normal leaflet separation  DOPPLER: The transtricuspid velocity was within the normal range  There was no evidence for stenosis  There was mild regurgitation  Estimated peak PA  pressure was 55 mmHg  The findings suggest moderate pulmonary hypertension  PULMONIC VALVE: Leaflets exhibited normal thickness, no calcification, and normal cuspal separation  DOPPLER: The transpulmonic velocity was within the normal range  There was trace regurgitation  PERICARDIUM: There was no pericardial effusion  The pericardium was normal in appearance  AORTA: The root exhibited normal size  SYSTEM MEASUREMENT TABLES    2D  %FS: 17 94 %  Ao Diam: 2 72 cm  Ao asc: 2 46 cm  EDV(Teich): 55 79 ml  EF(Teich): 38 05 %  ESV(Teich): 34 57 ml  IVSd: 1 08 cm  LA Area: 16 14 cm2  LA Diam: 3 04 cm  LVEDV MOD A4C: 52 91 ml  LVEF MOD A4C: 38 16 %  LVESV MOD A4C: 32 72 ml  LVIDd: 3 64 cm  LVIDs: 2 98 cm  LVLd A4C: 6 09 cm  LVLs A4C: 5 64 cm  LVPWd: 0 93 cm  RA Area: 7 24 cm2  RVIDd: 2 34 cm  SV MOD A4C: 20 19 ml  SV(Teich): 21 23 ml    CW  TR Vmax: 3 73 m/s  TR maxP 52 mmHg    MM  TAPSE: 2 12 cm    PW  E' Sept: 0 04 m/s  E/E' Sept: 21 24  MV A Nicola: 1 42 m/s  MV Dec [place]: 4 42 m/s2  MV DecT: 191 8 ms  MV E Nicola: 0 85 m/s  MV E/A Ratio: 0 6  MV PHT: 55 62 ms  MVA By PHT: 3 96 cm2    [employer] Accredited Echocardiography Laboratory    Prepared and electronically signed by    Any Lomas MD  Signed 03-Dec-2020 13:58:50         CATH/STRESS TEST ():  CORONARY CIRCULATION:  Ostial left main: There was a 75 % stenosis  The lesion was complex, eccentric, and mildly calcified  This is a likely culprit for the patient's clinical presentation  An intervention was performed  IVUS 4 9mm2 Area stenosis 70%  Proximal LAD: There was a diffuse 95 % stenosis  Severe diffuse disease on IVUS  1st obtuse marginal: There was a 100 % stenosis  Mid RCA: There was a 100 % stenosis  This lesion is a chronic total occlusion    Graft to the 1st diagonal: There was a 100 % stenosis at the proximal anastomosis  It does not appear amenable to intervention  Graft to the 1st obtuse marginal: There was a 100 % stenosis at the proximal anastomosis  It does not appear amenable to intervention  Graft to the distal RCA: There was a 50 % stenosis in the distal third of the graft  It appears amenable to percutaneous intervention      1ST LESION INTERVENTIONS:  A successful balloon angioplasty with stent procedure was performed on the 95 % lesion in the proximal LAD  Following intervention there was an excellent angiographic appearance with a 0 % residual stenosis  A Resolute Alex Rx 2 5 x 22mm drug-eluting stent was placed across the lesion and deployed at a maximum inflation pressure of 12 irwin      2ND LESION INTERVENTIONS:  A successful drug-eluting stent procedure was performed on the 75 % lesion in the ostial left main  Following intervention there was an excellent angiographic appearance with a 0 % residual stenosis  A Resolute Alex Rx 3 5 x 08mm drug-eluting stent was placed across the lesion and deployed at a maximum inflation pressure of 12 irwin  EKG:   None     History of Present Illness   Physician Requesting Consult: Angelica Aguillon DO  Reason for Consult / Principal Problem: paroxysmal atrial fibrillation    HPI: Dev Kahn is a 79y o  year old female with a history as above who was readmitted to [facility] on 1-29 from [facility] due to seizure likely activity  EP being consulted on 2-3 for atrial fibrillation management  Patient was admitted on 1-11 due to worsening chest discomfort with history of CABG undergoing LHC showing LM and LAD stenosis requiring LAMINE to each vessel  She tolerated the cath well with unfortunately needing to be seen by EP due to 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  She underwent [employer] DC PPM by dr Maria Del Rosario Hinojosa on 1-19-21 as well as initiation of xarelto for [facility]   Patient was discharged to [facility] on 1-21 post PPM placement  On 1-29 she was again admitted to [facility] due to seizure like activity witnessed by [facility]  She underwent EEG w/ other neurology workup being negative for seizure diagnosis  Due to concern of aspiration PNA she was placed on IV abx  On 2-2 she was a rapid response due to chest pain and atrial fibrillation w/ HR's between 160-200bpm  She was placed on IV amiodarone with cessation of AF  EP consulted or further AF management  Patient does not remember events of last evening and today is feeling extremely tired  She is not having any LH, dizziness, palpitations or chest discomfort  Review of Systems  ROS as noted above, otherwise 12 point review of systems was performed and is negative  Historical Information   Past Medical History:   Diagnosis Date    Anal fissure     Cardiac disorder     Cognitive changes 12/23/2020    Esophageal reflux     Esophagitis, reflux     Hemorrhoids     Hepatic hemangioma     Last Assessed: 1/13/2015    Herpes zoster     History of colonic polyps     Hypertension     Ischemic colitis ([address] )     Lumbar herniated disc     Malignant neoplasm without specification of site ([address] )     Nephrolithiasis     L   Lithotripsy    Nontoxic single thyroid nodule     Last Assessed: 1/13/2015    Osteoarthritis     Overactive bladder     Raynaud disease     Respiratory system disease     Sjogren's disease ([address] )     Spinal stenosis     PONCHO (stress urinary incontinence, female)     Uterovaginal prolapse     Grade I-II     Past Surgical History:   Procedure Laterality Date    APPENDECTOMY  1947    CARDIAC SURGERY      CABG    CATARACT EXTRACTION Bilateral     COLONOSCOPY  2012    Fiberoptic    COLONOSCOPY      Resolved: 2006 - 2012 5 year f/u    CORONARY ANGIOPLASTY WITH STENT PLACEMENT      CORONARY ARTERY BYPASS GRAFT      Resolved: 2012    ESOPHAGOGASTRODUODENOSCOPY  2012    Diagnostic    HEMORROIDECTOMY      KNEE SURGERY      LITHOTRIPSY      Renal    MALIGNANT SKIN LESION EXCISION      Face; Resolved: 2004    IA ESOPHAGOGASTRODUODENOSCOPY TRANSORAL DIAGNOSTIC N/A 4/13/2016    Procedure: EGD AND COLONOSCOPY;  Surgeon: Jatin Delvalle MD;  [facility];   Service: Gastroenterology    RENAL ARTERY STENT      SKIN LESION EXCISION      Scalp    SOFT TISSUE TUMOR RESECTION      Shoulder; Resolved: 1995    THROMBOLYSIS      Postoperative Thrombolysis PTCA    TONSILLECTOMY      Resolved: 1944     Social History     Substance and Sexual Activity   Alcohol Use Not Currently    Frequency: Monthly or less    Comment: social     Social History     Substance and Sexual Activity   Drug Use No     Social History     Tobacco Use   Smoking Status Never Smoker   Smokeless Tobacco Never Used     Family History: non-contributory    Meds/Allergies   Hospital Medications:   Current Facility-Administered Medications   Medication Dose Route Frequency    acetaminophen (TYLENOL) tablet 650 mg  650 mg Oral Q6H PRN    amiodarone (CORDARONE) 900 mg in dextrose 5 % 500 mL infusion  1 mg/min Intravenous Continuous    atorvastatin (LIPITOR) tablet 20 mg  20 mg Oral Daily With Dinner    cefdinir (OMNICEF) capsule 300 mg  300 mg Oral Q12H [address]    furosemide (LASIX) tablet 40 mg  40 mg Oral Daily    hydrALAZINE (APRESOLINE) injection 5 mg  5 mg Intravenous Q6H PRN    levETIRAcetam (KEPPRA) tablet 500 mg  500 mg Oral Q12H [place]    lidocaine (PF) (XYLOCAINE-MPF) 1 % injection 5 mL  5 mL Infiltration Once    metroNIDAZOLE (FLAGYL) tablet 500 mg  500 mg Oral Q8H [address]    ondansetron (ZOFRAN) injection 4 mg  4 mg Intravenous Q6H PRN    oxyCODONE (ROXICODONE) IR tablet 2 5 mg  2 5 mg Oral Q4H PRN    pantoprazole (PROTONIX) EC tablet 40 mg  40 mg Oral Early Morning    prasugrel (EFFIENT) tablet 10 mg  10 mg Oral Daily    psyllium (METAMUCIL) 1 packet  1 packet Oral Daily    rivaroxaban (XARELTO) tablet 15 mg  15 mg Oral Daily With MadeiraMadeira boulardii (FLORASTOR) capsule 250 mg  250 mg Oral BID     Home Medications:   Medications Prior to Admission   Medication    amLODIPine (NORVASC) 10 mg tablet    apixaban (ELIQUIS) 5 mg    aspirin (ECOTRIN LOW STRENGTH) 81 mg EC tablet    atorvastatin (LIPITOR) 20 mg tablet    docusate sodium (Colace) 100 mg capsule    furosemide (LASIX) 40 mg tablet    isosorbide mononitrate (IMDUR) 30 mg 24 hr tablet    metoprolol succinate (TOPROL-XL) 50 mg 24 hr tablet    multivitamin (THERAGRAN) TABS    nitroglycerin (NITROSTAT) 0 4 mg SL tablet    pantoprazole (PROTONIX) 40 mg tablet    polyethylene glycol (MIRALAX) 17 g packet    prasugrel (EFFIENT) tablet    ranolazine (RANEXA) 1000 MG SR tablet    rivaroxaban (XARELTO) 20 mg tablet    senna (SENOKOT) 8 6 mg       Allergies   Allergen Reactions    Sulfa Antibiotics Anaphylaxis    Formaldehyde     Codeine Palpitations       Objective   Vitals: Blood pressure 106/57, pulse 72, temperature 97 6 °F (36 4 °C), temperature source Oral, resp  rate 18, weight 66 kg (145 lb 8 1 oz), SpO2 100 %, not currently breastfeeding  Orthostatic Blood Pressures      Most Recent Value   Blood Pressure  106/57 filed at 02/03/2021 0700   Patient Position - Orthostatic VS  Lying filed at 02/03/2021 0700            Intake/Output Summary (Last 24 hours) at 2/3/2021 0957  Last data filed at 2/3/2021 5139  Gross per 24 hour   Intake 0 ml   Output 725 ml   Net -725 ml       Invasive Devices     Peripheral Intravenous Line            Peripheral IV 02/02/21 Left Hand less than 1 day    Peripheral IV 02/02/21 Left Wrist less than 1 day          Drain            Urethral Catheter Other (Comment) 16 Fr  10 days                Physical Exam  Constitutional:       Appearance: She is well-developed  HENT:      Head: Normocephalic and atraumatic  Eyes:      Pupils: Pupils are equal, round, and reactive to light  Neck:      Musculoskeletal: Normal range of motion and neck supple  Cardiovascular:      Rate and Rhythm: Normal rate and regular rhythm  Pulmonary:      Effort: Pulmonary effort is normal       Breath sounds: Examination of the right-lower field reveals rales  Examination of the left-lower field reveals rales  Rales present  Abdominal:      General: Bowel sounds are normal       Palpations: Abdomen is soft  Musculoskeletal: Normal range of motion  Skin:     General: Skin is warm and dry  Neurological:      Mental Status: She is alert and oriented to person, place, and time  Lab Results: I have personally reviewed pertinent lab results  Results from last 7 days   Lab Units 02/02/21 1652 02/02/21 0457 02/01/21 0418   WBC Thousand/uL 7 88 7 25 6 13   HEMOGLOBIN g/dL 10 9* 10 4* 9 1*   HEMATOCRIT % 34 0* 33 4* 29 1*   PLATELETS Thousands/uL 229 236 236     Results from last 7 days   Lab Units 02/02/21 1652 02/02/21 0457 02/01/21 0418 01/29/21  1746   POTASSIUM mmol/L 3 8 3 6 3 6   < >  --    CHLORIDE mmol/L 103 103 104   < >  --    CO2 mmol/L 26 30 31   < >  --    CO2, I-STAT mmol/L  --   --   --   --  33*   BUN mg/dL 9 8 8   < >  --    CREATININE mg/dL 0 59* 0 55* 0 59*   < >  --    GLUCOSE, ISTAT mg/dl  --   --   --   --  106   CALCIUM mg/dL 8 6 8 6 8 2*   < >  --     < > = values in this interval not displayed       Results from last 7 days   Lab Units 01/29/21  1156   INR  2 89*   PTT seconds 56*     Results from last 7 days   Lab Units 02/02/21 1652 02/02/21 0457 02/01/21 0418   MAGNESIUM mg/dL 3 6* 1 8 2 0

## 2021-02-03 NOTE — PROGRESS NOTES
Update to [facility]    Reviewed QTc from recent ECG, QTc around 480ms, will use Amiodarone 200mg BID for two weeks then 200mg daily to prevent significant QTc prolongation  Primary team updated, orders changed

## 2021-02-03 NOTE — OCCUPATIONAL THERAPY NOTE
[facility] OT Discharge Note  Pt demonstrated a change in medical status this morning and was transferred back to acute inpatient for further monitoring and treatment  Recommend skilled OT intervention once medically stable to participate         Kyleigh Moreau MS, OTR/L

## 2021-02-03 NOTE — NURSING NOTE
Patients last IV infiltrated  Difficult stick, stuck multiple times by multiple staff  Attempted new IV again unsuccessful and patient now refusing to be stuck  SLIM aware and charge aware  Will continue to monitor and consult PICC team for assistance

## 2021-02-03 NOTE — PLAN OF CARE
Problem: Prexisting or High Potential for Compromised Skin Integrity  Goal: Skin integrity is maintained or improved  Description: INTERVENTIONS:  - Identify patients at risk for skin breakdown  - Assess and monitor skin integrity  - Assess and monitor nutrition and hydration status  - Monitor labs   - Assess for incontinence   - Turn and reposition patient  - Assist with mobility/ambulation  - Relieve pressure over bony prominences  - Avoid friction and shearing  - Provide appropriate hygiene as needed including keeping skin clean and dry  - Evaluate need for skin moisturizer/barrier cream  - Collaborate with interdisciplinary team   - Patient/family teaching  - Consider wound care consult   Outcome: Progressing     Problem: Neurological Deficit  Goal: Neurological status is stable or improving  Description: Interventions:  - Monitor and assess patient's level of consciousness, motor function, sensory function, and level of assistance needed for ADLs  - Monitor and report changes from baseline  Collaborate with interdisciplinary team to initiate plan and implement interventions as ordered  - Provide and maintain a safe environment  - Consider seizure precautions  - Consider fall precautions  - Consider aspiration precautions  - Consider bleeding precautions  Outcome: Progressing     Problem: Activity Intolerance/Impaired Mobility  Goal: Mobility/activity is maintained at optimum level for patient  Description: Interventions:  - Assess and monitor patient  barriers to mobility and need for assistive/adaptive devices  - Assess patient's emotional response to limitations  - Collaborate with interdisciplinary team and initiate plans and interventions as ordered  - Encourage independent activity per ability   - Maintain proper body alignment  - Perform active/passive rom as tolerated/ordered    - Plan activities to conserve energy   - Turn patient as appropriate  Outcome: Progressing     Problem: Communication Impairment  Goal: Ability to express needs and understand communication  Description: Assess patient's communication skills and ability to understand information  Patient will demonstrate use of effective communication techniques, alternative methods of communication and understanding even if not able to speak  - Encourage communication and provide alternate methods of communication as needed  - Collaborate with case management/ for discharge needs  - Include patient/family/caregiver in decisions related to communication  Outcome: Progressing     Problem: Potential for Aspiration  Goal: Non-ventilated patient's risk of aspiration is minimized  Description: Assess and monitor vital signs, respiratory status, and labs (WBC)  Monitor for signs of aspiration (tachypnea, cough, rales, wheezing, cyanosis, fever)  - Assess and monitor patient's ability to swallow  - Place patient up in chair to eat if possible  - HOB up at 90 degrees to eat if unable to get patient up into chair   - Supervise patient during oral intake  - Instruct patient/ family to take small bites  - Instruct patient/ family to take small single sips when taking liquids  - Follow patient-specific strategies generated by speech pathologist   Outcome: Progressing     Problem: Nutrition  Goal: Nutrition/Hydration status is improving  Description: Monitor and assess patient's nutrition/hydration status for malnutrition (ex- brittle hair, bruises, dry skin, pale skin and conjunctiva, muscle wasting, smooth red tongue, and disorientation)  Collaborate with interdisciplinary team and initiate plan and interventions as ordered  Monitor patient's weight and dietary intake as ordered or per policy  Utilize nutrition screening tool and intervene per policy  Determine patient's food preferences and provide high-protein, high-caloric foods as appropriate       - Assist patient with eating   - Allow adequate time for meals   - Encourage patient to take dietary supplement as ordered  - Collaborate with clinical nutritionist   - Include patient/family/caregiver in decisions related to nutrition  Outcome: Progressing     Problem: PAIN - ADULT  Goal: Verbalizes/displays adequate comfort level or baseline comfort level  Description: Interventions:  - Encourage patient to monitor pain and request assistance  - Assess pain using appropriate pain scale  - Administer analgesics based on type and severity of pain and evaluate response  - Implement non-pharmacological measures as appropriate and evaluate response  - Consider cultural and social influences on pain and pain management  - Notify physician/advanced practitioner if interventions unsuccessful or patient reports new pain  Outcome: Progressing     Problem: INFECTION - ADULT  Goal: Absence or prevention of progression during hospitalization  Description: INTERVENTIONS:  - Assess and monitor for signs and symptoms of infection  - Monitor lab/diagnostic results  - Monitor all insertion sites, i e  indwelling lines, tubes, and drains  - Monitor endotracheal if appropriate and nasal secretions for changes in amount and color  - [place] appropriate cooling/warming therapies per order  - Administer medications as ordered  - Instruct and encourage patient and family to use good hand hygiene technique  - Identify and instruct in appropriate isolation precautions for identified infection/condition  Outcome: Progressing  Goal: Absence of fever/infection during neutropenic period  Description: INTERVENTIONS:  - Monitor WBC    Outcome: Progressing     Problem: SAFETY ADULT  Goal: Patient will remain free of falls  Description: INTERVENTIONS:  - Assess patient frequently for physical needs  -  Identify cognitive and physical deficits and behaviors that affect risk of falls    -  [place] fall precautions as indicated by assessment   - Educate patient/family on patient safety including physical limitations  - Instruct patient to call for assistance with activity based on assessment  - Modify environment to reduce risk of injury  - Consider OT/PT consult to assist with strengthening/mobility  Outcome: Progressing  Goal: Maintain or return to baseline ADL function  Description: INTERVENTIONS:  -  Assess patient's ability to carry out ADLs; assess patient's baseline for ADL function and identify physical deficits which impact ability to perform ADLs (bathing, care of mouth/teeth, toileting, grooming, dressing, etc )  - Assess/evaluate cause of self-care deficits   - Assess range of motion  - Assess patient's mobility; develop plan if impaired  - Assess patient's need for assistive devices and provide as appropriate  - Encourage maximum independence but intervene and supervise when necessary  - Involve family in performance of ADLs  - Assess for home care needs following discharge   - Consider OT consult to assist with ADL evaluation and planning for discharge  - Provide patient education as appropriate  Outcome: Progressing  Goal: Maintain or return mobility status to optimal level  Description: INTERVENTIONS:  - Assess patient's baseline mobility status (ambulation, transfers, stairs, etc )    - Identify cognitive and physical deficits and behaviors that affect mobility  - Identify mobility aids required to assist with transfers and/or ambulation (gait belt, sit-to-stand, lift, walker, cane, etc )  - [place] fall precautions as indicated by assessment  - Record patient progress and toleration of activity level on Mobility SBAR; progress patient to next Phase/Stage  - Instruct patient to call for assistance with activity based on assessment  - Consider rehabilitation consult to assist with strengthening/weightbearing, etc   Outcome: Progressing     Problem: DISCHARGE PLANNING  Goal: Discharge to home or other facility with appropriate resources  Description: INTERVENTIONS:  - Identify barriers to discharge w/patient and caregiver  - Arrange for needed discharge resources and transportation as appropriate  - Identify discharge learning needs (meds, wound care, etc )  - Arrange for interpretive services to assist at discharge as needed  - Refer to Case Management Department for coordinating discharge planning if the patient needs post-hospital services based on physician/advanced practitioner order or complex needs related to functional status, cognitive ability, or social support system  Outcome: Progressing     Problem: Knowledge Deficit  Goal: Patient/family/caregiver demonstrates understanding of disease process, treatment plan, medications, and discharge instructions  Description: Complete learning assessment and assess knowledge base  Interventions:  - Provide teaching at level of understanding  - Provide teaching via preferred learning methods  Outcome: Progressing     Problem: Potential for Falls  Goal: Patient will remain free of falls  Description: INTERVENTIONS:  - Assess patient frequently for physical needs  -  Identify cognitive and physical deficits and behaviors that affect risk of falls  -  [place] fall precautions as indicated by assessment   - Educate patient/family on patient safety including physical limitations  - Instruct patient to call for assistance with activity based on assessment  - Modify environment to reduce risk of injury  - Consider OT/PT consult to assist with strengthening/mobility  Outcome: Progressing     Problem: Nutrition/Hydration-ADULT  Goal: Nutrient/Hydration intake appropriate for improving, restoring or maintaining nutritional needs  Description: Monitor and assess patient's nutrition/hydration status for malnutrition  Collaborate with interdisciplinary team and initiate plan and interventions as ordered  Monitor patient's weight and dietary intake as ordered or per policy  Utilize nutrition screening tool and intervene as necessary   Determine patient's food preferences and provide high-protein, high-caloric foods as appropriate       INTERVENTIONS:  - Monitor oral intake, urinary output, labs, and treatment plans  - Assess nutrition and hydration status and recommend course of action  - Evaluate amount of meals eaten  - Assist patient with eating if necessary   - Allow adequate time for meals  - Recommend/ encourage appropriate diets, oral nutritional supplements, and vitamin/mineral supplements  - Order, calculate, and assess calorie counts as needed  - Recommend, monitor, and adjust tube feedings and TPN/PPN based on assessed needs  - Assess need for intravenous fluids  - Provide specific nutrition/hydration education as appropriate  - Include patient/family/caregiver in decisions related to nutrition  Outcome: Progressing     Problem: CARDIOVASCULAR - ADULT  Goal: Maintains optimal cardiac output and hemodynamic stability  Description: INTERVENTIONS:  - Monitor I/O, vital signs and rhythm  - Monitor for S/S and trends of decreased cardiac output  - Administer and titrate ordered vasoactive medications to optimize hemodynamic stability  - Assess quality of pulses, skin color and temperature  - Assess for signs of decreased coronary artery perfusion  - Instruct patient to report change in severity of symptoms  Outcome: Progressing  Goal: Absence of cardiac dysrhythmias or at baseline rhythm  Description: INTERVENTIONS:  - Continuous cardiac monitoring, vital signs, obtain 12 lead EKG if ordered  - Administer antiarrhythmic and heart rate control medications as ordered  - Monitor electrolytes and administer replacement therapy as ordered  Outcome: Progressing     Problem: RESPIRATORY - ADULT  Goal: Achieves optimal ventilation and oxygenation  Description: INTERVENTIONS:  - Assess for changes in respiratory status  - Assess for changes in mentation and behavior  - Position to facilitate oxygenation and minimize respiratory effort  - Oxygen administered by appropriate delivery if ordered  - Initiate smoking cessation education as indicated  - Encourage broncho-pulmonary hygiene including cough, deep breathe, Incentive Spirometry  - Assess the need for suctioning and aspirate as needed  - Assess and instruct to report SOB or any respiratory difficulty  - Respiratory Therapy support as indicated  Outcome: Progressing

## 2021-02-03 NOTE — CASE MANAGEMENT
Message received, [facility] recommending Sub Acute rehab for longer slower paced therapy  A post acute care recommendation was made by your care team for STR  Discussed Freedom of Choice with patient  List of facilities given to patient via in person  patient aware the list is custom filtered for them by zip code location and that [facility] post acute providers are designated

## 2021-02-04 ENCOUNTER — APPOINTMENT (INPATIENT)
Dept: RADIOLOGY | Facility: HOSPITAL | Age: 81
DRG: 100 | End: 2021-02-04
Attending: INTERNAL MEDICINE
Payer: MEDICARE

## 2021-02-04 LAB
ANION GAP SERPL CALCULATED.3IONS-SCNC: 5 MMOL/L (ref 4–13)
ATRIAL RATE: 86 BPM
BACTERIA BLD CULT: NORMAL
BACTERIA BLD CULT: NORMAL
BUN SERPL-MCNC: 8 MG/DL (ref 5–25)
CALCIUM SERPL-MCNC: 9 MG/DL (ref 8.3–10.1)
CHLORIDE SERPL-SCNC: 98 MMOL/L (ref 100–108)
CO2 SERPL-SCNC: 27 MMOL/L (ref 21–32)
CREAT SERPL-MCNC: 0.82 MG/DL (ref 0.6–1.3)
ERYTHROCYTE [DISTWIDTH] IN BLOOD BY AUTOMATED COUNT: 15 % (ref 11.6–15.1)
GFR SERPL CREATININE-BSD FRML MDRD: 67 ML/MIN/1.73SQ M
GLUCOSE SERPL-MCNC: 105 MG/DL (ref 65–140)
GLUCOSE SERPL-MCNC: 108 MG/DL (ref 65–140)
GLUCOSE SERPL-MCNC: 117 MG/DL (ref 65–140)
GLUCOSE SERPL-MCNC: 119 MG/DL (ref 65–140)
HCT VFR BLD AUTO: 42.4 % (ref 34.8–46.1)
HGB BLD-MCNC: 13.2 G/DL (ref 11.5–15.4)
MAGNESIUM SERPL-MCNC: 2 MG/DL (ref 1.6–2.6)
MCH RBC QN AUTO: 30.5 PG (ref 26.8–34.3)
MCHC RBC AUTO-ENTMCNC: 31.1 G/DL (ref 31.4–37.4)
MCV RBC AUTO: 98 FL (ref 82–98)
P AXIS: 91 DEGREES
PLATELET # BLD AUTO: 245 THOUSANDS/UL (ref 149–390)
PMV BLD AUTO: 10.1 FL (ref 8.9–12.7)
POTASSIUM SERPL-SCNC: 5.2 MMOL/L (ref 3.5–5.3)
QRS AXIS: 145 DEGREES
QRSD INTERVAL: 128 MS
QT INTERVAL: 440 MS
QTC INTERVAL: 526 MS
RBC # BLD AUTO: 4.33 MILLION/UL (ref 3.81–5.12)
SODIUM SERPL-SCNC: 130 MMOL/L (ref 136–145)
T WAVE AXIS: 248 DEGREES
VENTRICULAR RATE: 86 BPM
WBC # BLD AUTO: 8.38 THOUSAND/UL (ref 4.31–10.16)

## 2021-02-04 PROCEDURE — 93005 ELECTROCARDIOGRAM TRACING: CPT

## 2021-02-04 PROCEDURE — 99024 POSTOP FOLLOW-UP VISIT: CPT | Performed by: INTERNAL MEDICINE

## 2021-02-04 PROCEDURE — 85027 COMPLETE CBC AUTOMATED: CPT | Performed by: INTERNAL MEDICINE

## 2021-02-04 PROCEDURE — C1751 CATH, INF, PER/CENT/MIDLINE: HCPCS

## 2021-02-04 PROCEDURE — 93010 ELECTROCARDIOGRAM REPORT: CPT | Performed by: INTERNAL MEDICINE

## 2021-02-04 PROCEDURE — 99233 SBSQ HOSP IP/OBS HIGH 50: CPT | Performed by: INTERNAL MEDICINE

## 2021-02-04 PROCEDURE — 36569 INSJ PICC 5 YR+ W/O IMAGING: CPT

## 2021-02-04 PROCEDURE — 02HV33Z INSERTION OF INFUSION DEVICE INTO SUPERIOR VENA CAVA, PERCUTANEOUS APPROACH: ICD-10-PCS | Performed by: INTERNAL MEDICINE

## 2021-02-04 PROCEDURE — 97116 GAIT TRAINING THERAPY: CPT

## 2021-02-04 PROCEDURE — 97110 THERAPEUTIC EXERCISES: CPT

## 2021-02-04 PROCEDURE — 82948 REAGENT STRIP/BLOOD GLUCOSE: CPT

## 2021-02-04 PROCEDURE — 99291 CRITICAL CARE FIRST HOUR: CPT | Performed by: PHYSICIAN ASSISTANT

## 2021-02-04 PROCEDURE — 83735 ASSAY OF MAGNESIUM: CPT | Performed by: PHYSICIAN ASSISTANT

## 2021-02-04 PROCEDURE — 80048 BASIC METABOLIC PNL TOTAL CA: CPT | Performed by: PHYSICIAN ASSISTANT

## 2021-02-04 PROCEDURE — 71045 X-RAY EXAM CHEST 1 VIEW: CPT

## 2021-02-04 RX ORDER — METOPROLOL TARTRATE 5 MG/5ML
5 INJECTION INTRAVENOUS ONCE
Status: COMPLETED | OUTPATIENT
Start: 2021-02-04 | End: 2021-02-04

## 2021-02-04 RX ORDER — AMIODARONE HYDROCHLORIDE 200 MG/1
200 TABLET ORAL
Status: DISCONTINUED | OUTPATIENT
Start: 2021-02-04 | End: 2021-02-09

## 2021-02-04 RX ORDER — FUROSEMIDE 40 MG/1
40 TABLET ORAL DAILY
Status: DISCONTINUED | OUTPATIENT
Start: 2021-02-04 | End: 2021-02-05

## 2021-02-04 RX ADMIN — LEVETIRACETAM 500 MG: 500 TABLET, FILM COATED ORAL at 10:01

## 2021-02-04 RX ADMIN — LEVETIRACETAM 500 MG: 500 TABLET, FILM COATED ORAL at 21:26

## 2021-02-04 RX ADMIN — PANTOPRAZOLE SODIUM 40 MG: 40 TABLET, DELAYED RELEASE ORAL at 06:40

## 2021-02-04 RX ADMIN — AMIODARONE HYDROCHLORIDE 200 MG: 200 TABLET ORAL at 13:44

## 2021-02-04 RX ADMIN — AMIODARONE HYDROCHLORIDE 0.5 MG/MIN: 50 INJECTION, SOLUTION INTRAVENOUS at 11:40

## 2021-02-04 RX ADMIN — METRONIDAZOLE 500 MG: 500 TABLET ORAL at 06:40

## 2021-02-04 RX ADMIN — ATORVASTATIN CALCIUM 20 MG: 20 TABLET, FILM COATED ORAL at 18:04

## 2021-02-04 RX ADMIN — FUROSEMIDE 40 MG: 40 TABLET ORAL at 18:04

## 2021-02-04 RX ADMIN — PRASUGREL HYDROCHLORIDE 10 MG: 10 TABLET, FILM COATED ORAL at 10:01

## 2021-02-04 RX ADMIN — RIVAROXABAN 15 MG: 15 TABLET, FILM COATED ORAL at 18:04

## 2021-02-04 RX ADMIN — METOROPROLOL TARTRATE 5 MG: 5 INJECTION, SOLUTION INTRAVENOUS at 02:09

## 2021-02-04 RX ADMIN — CEFDINIR 300 MG: 300 CAPSULE ORAL at 06:40

## 2021-02-04 RX ADMIN — AMIODARONE HYDROCHLORIDE 150 MG: 50 INJECTION, SOLUTION INTRAVENOUS at 02:54

## 2021-02-04 RX ADMIN — Medication 250 MG: at 10:01

## 2021-02-04 RX ADMIN — AMIODARONE HYDROCHLORIDE 200 MG: 200 TABLET ORAL at 18:04

## 2021-02-04 RX ADMIN — Medication 250 MG: at 18:04

## 2021-02-04 NOTE — PROGRESS NOTES
Progress Note - Garfield Land 1940, 79 y o  female MRN: [id number]    Unit/Bed#: [facility] 531-01 Encounter: [id number]    Primary Care Provider: Emigdio Holbrook MD   Date and time admitted to hospital: 1/29/2021  3:37 PM        Seizure-like activity ([facility] [address] )  Assessment & Plan  · Rapid response initiated while pt was at [facility] d/t seizure like activity  · S/p Neurology consult  · S/p EEG  · Not sure if able to have MRI as positive PPM; s/p CT head  · On keppra BID  · Sz precautions  · [place]NDOT filed per neurology  · No recurring sz like activity      * A-fib ([facility] [address] )  Assessment & Plan  · W/ recurring episodes of RVR  · Another rapid response at 2am confirmed per cards to afib   Received amiodarone bolus with resolved and no need to initiate on amiodarone gtt  · Cards/EP following   · On amiodarone  · A/c with xarelto  · Cont telemetry monitoring  · PICC to be placed for stable IV access    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)  Assessment & Plan  Wt Readings from Last 3 Encounters:   02/03/21 66 kg (145 lb 8 1 oz)   01/27/21 70 kg (154 lb 4 8 oz)   01/21/21 63 7 kg (140 lb 6 4 oz)   Acute on chronic systolic/diastolic HF, ef of 95%  Daily dosing of diuretics per cardiology pending fluid status  Cont I/O, daily wts          CAD (coronary artery disease)  Assessment & Plan  S/p cardiac cath/PCI/LAMINE x 2 1/11/2021  On effient, per cards on past admission d/c of asa given on xarelto as well  Pt is CP free    Urinary retention  Assessment & Plan  Continue with Saeed catheter for now  Voiding trial prior to discharge    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[facility])  Assessment & Plan  · + Pacemaker in place, 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    Dysphagia  Assessment & Plan  · Resolved; s/p speech/GI eval 2/2; cleared for upgrade of diet to regular     Hyponatremia  Assessment & Plan  Secondary to volume overload, daily dosing of diuretics per cardiology  Cont to monitor  No change in MS    Essential hypertension  Assessment & Plan  · Stable      VTE Pharmacologic Prophylaxis:   Pharmacologic: Rivaroxaban (Xarelto)  Mechanical VTE Prophylaxis in Place: No    Patient Centered Rounds: I have performed bedside rounds with nursing staff today  Discussions with Specialists or Other Care Team Provider:     Education and Discussions with Family / Patient: Patient; I also called her dtr and  to update on plan of care    Time Spent for Care: 45 minutes  More than 50% of total time spent on counseling and coordination of care as described above  Current Length of Stay: 6 day(s)    Current Patient Status: Inpatient   Certification Statement: The patient will continue to require additional inpatient hospital stay due to Recurring afib with rvr    Discharge Plan: D/c planning to snf vs arc upon medical stability    Code Status: Level 3 - DNAR and DNI      Subjective: Had rapid response initiated approx 2am secondary to rapid afib 130-190; confirmed per cardiology to be afib  Received amiodarone bolus with relief, in SR, controlled rate  Pt now sitting in chair  Denies any cardiac symptoms  During rapid response event indicating she just wants to go home but was obviously stressed and anxious, now states feels fine  Objective:     Vitals:   Temp (24hrs), Av 5 °F (36 9 °C), Min:98 °F (36 7 °C), Max:99 °F (37 2 °C)    Temp:  [98 °F (36 7 °C)-99 °F (37 2 °C)] 98 3 °F (36 8 °C)  HR:  [] 77  Resp:  [16-23] 16  BP: (101-152)/(50-83) 109/61  SpO2:  [94 %-98 %] 96 %  Body mass index is 30 41 kg/m²  Input and Output Summary (last 24 hours): Intake/Output Summary (Last 24 hours) at 2021 1000  Last data filed at 2021 0700  Gross per 24 hour   Intake 503 75 ml   Output 1150 ml   Net -646 25 ml       Physical Exam:     Physical Exam  Constitutional:       Appearance: Normal appearance  Neck:      Musculoskeletal: Normal range of motion and neck supple     Cardiovascular:      Rate and Rhythm: Normal rate and regular rhythm  Pulses: Normal pulses  Heart sounds: Normal heart sounds  Pulmonary:      Effort: Pulmonary effort is normal  No respiratory distress  Breath sounds: Rales present  No wheezing  Comments: Mild bibasilar crackles  Abdominal:      General: Abdomen is flat  There is no distension  Palpations: Abdomen is soft  Tenderness: There is no abdominal tenderness  There is no guarding  Musculoskeletal: Normal range of motion  Right lower leg: Edema present  Left lower leg: Edema present  Skin:     General: Skin is warm and dry  Neurological:      General: No focal deficit present  Mental Status: She is alert and oriented to person, place, and time  Psychiatric:         Mood and Affect: Mood normal          Behavior: Behavior normal          Additional Data:     Labs:    Results from last 7 days   Lab Units 02/04/21  0228 02/02/21  1652  01/31/21  0027   WBC Thousand/uL 8 38 7 88   < > 10 77*   HEMOGLOBIN g/dL 13 2 10 9*   < > 11 0*   HEMATOCRIT % 42 4 34 0*   < > 34 7*   PLATELETS Thousands/uL 245 229   < > 325   BANDS PCT %  --   --   --  4   NEUTROS PCT %  --  43   < >  --    LYMPHS PCT %  --  45*   < >  --    LYMPHO PCT %  --   --   --  23   MONOS PCT %  --  8   < >  --    MONO PCT %  --   --   --  8   EOS PCT %  --  2   < > 0    < > = values in this interval not displayed  Results from last 7 days   Lab Units 02/04/21  0209  01/31/21  0027   SODIUM mmol/L 130*   < > 135*   POTASSIUM mmol/L 5 2   < > 4 2   CHLORIDE mmol/L 98*   < > 101   CO2 mmol/L 27   < > 29   BUN mg/dL 8   < > 5   CREATININE mg/dL 0 82   < > 0 81   ANION GAP mmol/L 5   < > 5   CALCIUM mg/dL 9 0   < > 8 7   ALBUMIN g/dL  --   --  2 9*   TOTAL BILIRUBIN mg/dL  --   --  1 06*   ALK PHOS U/L  --   --  79   ALT U/L  --   --  27   AST U/L  --   --  28   GLUCOSE RANDOM mg/dL 105   < > 139    < > = values in this interval not displayed       Results from last 7 days   Lab Units 01/29/21  1156   INR 2 89*     Results from last 7 days   Lab Units 02/04/21  0626 02/03/21  2119 02/03/21  1510 02/03/21  1122 02/03/21  0648 02/02/21  2058 02/02/21  1603 02/02/21  1053 02/02/21  0746 02/01/21  2121 02/01/21  1551 02/01/21  1118   POC GLUCOSE mg/dl 108 96 129 163* 118 111 119 109 98 103 119 157*         Results from last 7 days   Lab Units 02/01/21  0612 02/01/21  0419 01/31/21  0616 01/31/21  0604 01/31/21  0027 01/30/21  1220   LACTIC ACID mmol/L  --   --   --  1 1 2 5*  --    PROCALCITONIN ng/ml 0 12 0 07 <0 05  --   --  0 07           * I Have Reviewed All Lab Data Listed Above  * Additional Pertinent Lab Tests Reviewed: All Labs Within Last 24 Hours Reviewed    Imaging:    Imaging Reports Reviewed Today Include:   Imaging Personally Reviewed by Myself Includes:      Recent Cultures (last 7 days):     Results from last 7 days   Lab Units 01/30/21  1311 01/30/21  1155   BLOOD CULTURE  No Growth After 4 Days  No Growth After 4 Days         Last 24 Hours Medication List:   Current Facility-Administered Medications   Medication Dose Route Frequency Provider Last Rate    acetaminophen  650 mg Oral Q6H PRN Jestine Leaks, PA-C      amiodarone  0 5 mg/min Intravenous Continuous [place] DYANA Hayes-C      atorvastatin  20 mg Oral Daily With DYANA [place]itting[place]-KATHIE      cefdinir  300 mg Oral Q12H [address] Jestine Leaks, PA-C      hydrALAZINE  5 mg Intravenous Q6H PRN Jestine Leaks, PA-C      levETIRAcetam  500 mg Oral Q12H [address] Jestine Leaks, PA-C      lidocaine (PF)  5 mL Infiltration Once Jestine Leaks, PA-C      ondansetron  4 mg Intravenous Q6H PRN Jestine Leaks, PA-C      oxyCODONE  2 5 mg Oral Q4H PRN Jestine Leaks, PA-C      pantoprazole  40 mg Oral Early Morning [place] KATHY Lopez      prasugrel  10 mg Oral Daily Jestine Leaks, CARLY      psyllium  1 packet Oral Daily Jestine Leaks, CARLY      rivaroxaban  15 mg Oral Daily With Patt Damian PA-C  saccharomyces boulardii  250 mg Oral BID Norman Flood PA-C          Today, Patient Was Seen By: Beatriz Vidal DO    ** Please Note: Dictation voice to text software may have been used in the creation of this document   **

## 2021-02-04 NOTE — ASSESSMENT & PLAN NOTE
· W/ recurring episodes of RVR  · Another rapid response at 2am confirmed per cards to afib   Received amiodarone bolus with resolved and no need to initiate on amiodarone gtt  · Cards/EP following   · On amiodarone  · A/c with xarelto  · Cont telemetry monitoring  · PICC to be placed for stable IV access

## 2021-02-04 NOTE — ASSESSMENT & PLAN NOTE
S/p cardiac cath/PCI/LAMINE x 2 1/11/2021  On effient, per cards on past admission d/c of asa given on xarelto as well  Pt is CP free

## 2021-02-04 NOTE — ASSESSMENT & PLAN NOTE
Wt Readings from Last 3 Encounters:   02/03/21 66 kg (145 lb 8 1 oz)   01/27/21 70 kg (154 lb 4 8 oz)   01/21/21 63 7 kg (140 lb 6 4 oz)   Acute on chronic systolic/diastolic HF  Received IV lasix today  Daily dosing per cardiology pending fluid status

## 2021-02-04 NOTE — NURSING NOTE
At approx 0130 pt had what looked like a 20 sec run of Vtach  Ran into the room to check on her, pt sleeping, comfortable, VSS, asymptomatic  Contacted Diony Yoon and Juan Carlos Colmenares  Pt did not have IV access - grabbed site rite and multiple RNs to attempt access  A few mins later, same rhythm occurred for a few seconds  And then again a minute later and pt sustained same rhythm  Cards interpreted rhythm as rapid afib  SLIM at bedside  Contacted Pharm to see fastest PO and IM options to break rhythm  RR called for critical care  Able to get 2-22G IV access >> 5mg metoprolol given  Pt rate dropped from 170s to 140s with metop  Cards recommend amio bolus and gtt if not responding to bolus  Pt responded to amio bolus after 15 mins from start  Rate controlled in 70s  Holding off on gtt  Will contact SLIM/Juan Carlos if bolus does not sustain VPaced rhythm

## 2021-02-04 NOTE — RAPID RESPONSE
Progress Note - Rapid Response   Peter Worrell 79 y o  female MRN: [id number]    Time Called ( Time): 0205  Date Called: 02/04/2020  Level of Care: MS  Room#: 994  MOESFUC Time ( Time): 0206  Event End Time ( Time): 0230  Primary reason for call: Acute change in HR  Interventions:  Airway/Breathing:  No Intervention  Circulation: N/A  Other Treatments: N/A       Assessment:   1  AFib with RVR    Plan:   · Patient was switched to p o  Amiodarone  · Received Lopressor 5 mg IV times broke and returned back to our rhythm in the 130s to 150  · Amiodarone 150 bolus x1 if not resolved but then started amnio drip  · Cardiology already following       HPI/Chief Complaint (Background/Situation):   Peter Worrell is a 79y o  year old female who presents with if seizure-like activity in AFib with RVR had been on Keppra at home, seizure precaution neurology is following also patient had a problem with AFib with RVR and was recently diagnosed with new onset AFib EP is on board was on an amiodarone drip and was transitioned to p o  Amiodarone  Patient itself says no complaints blood pressure stable heart rate fluctuating between 130-190    Historical Information   Past Medical History:   Diagnosis Date    Anal fissure     Cardiac disorder     Cognitive changes 12/23/2020    Esophageal reflux     Esophagitis, reflux     Hemorrhoids     Hepatic hemangioma     Last Assessed: 1/13/2015    Herpes zoster     History of colonic polyps     Hypertension     [facility] [address] [facility]     Lumbar herniated disc     Malignant neoplasm without specification of site ([facility] [address] [facility]     Nephrolithiasis     L   Lithotripsy    Nontoxic single thyroid nodule     Last Assessed: 1/13/2015    Osteoarthritis     Overactive bladder     Raynaud disease     Respiratory system disease     Sjogren's disease ([facility] [address] [facility]     Spinal stenosis     PONCHO (stress urinary incontinence, female)     Uterovaginal prolapse     Grade I-II Past Surgical History:   Procedure Laterality Date    APPENDECTOMY  1947    CARDIAC SURGERY      CABG    CATARACT EXTRACTION Bilateral     COLONOSCOPY  2012    Fiberoptic    COLONOSCOPY      Resolved: 2006 - 2012 5 year f/u    CORONARY ANGIOPLASTY WITH STENT PLACEMENT      CORONARY ARTERY BYPASS GRAFT      Resolved: 2012    ESOPHAGOGASTRODUODENOSCOPY  2012    Diagnostic    HEMORROIDECTOMY      KNEE SURGERY      LITHOTRIPSY      Renal    MALIGNANT SKIN LESION EXCISION      Face; Resolved: 2004    LA ESOPHAGOGASTRODUODENOSCOPY TRANSORAL DIAGNOSTIC N/A 4/13/2016    Procedure: EGD AND COLONOSCOPY;  Surgeon: Karla Mclean MD;  [facility];   Service: Gastroenterology    RENAL ARTERY STENT      SKIN LESION EXCISION      Scalp    SOFT TISSUE TUMOR RESECTION      Shoulder; Resolved: 1995    THROMBOLYSIS      Postoperative Thrombolysis PTCA    TONSILLECTOMY      Resolved: 1944     Social History   Social History     Substance and Sexual Activity   Alcohol Use Not Currently    Frequency: Monthly or less    Comment: social     Social History     Substance and Sexual Activity   Drug Use No     Social History     Tobacco Use   Smoking Status Never Smoker   Smokeless Tobacco Never Used     Family History:   Family History   Problem Relation Age of Onset    Heart disease Mother     Hypertension Mother     Osteoporosis Mother     Heart disease Father     Hypertension Father     Ulcerative colitis Family     Colon polyps Family        Meds/Allergies   Current Facility-Administered Medications   Medication Dose Route Frequency Provider Last Rate    acetaminophen  650 mg Oral Q6H PRN Lacretia CARLY Garcia      amiodarone  1 mg/min Intravenous Continuous CambCARLY mahmood Stopped (02/04/21 0328)    Followed by   Garrick Strickland amiodarone  0 5 mg/min Intravenous Continuous Leonila Foster PA-C      atorvastatin  20 mg Oral Daily With [employer]      cefdinir  300 mg Oral Q12H [facility] Jayy Lawson PA-C      hydrALAZINE  5 mg Intravenous Q6H PRN Jayy Lawson PA-C      levETIRAcetam  500 mg Oral Q12H [facility] Jayy Lawson PA-C      lidocaine (PF)  5 mL Infiltration Once Jayy Lawson PA-C      metroNIDAZOLE  500 mg Oral [address] Jayy Lawson PA-C      ondansetron  4 mg Intravenous Q6H PRN Jayy Lawson PA-C      oxyCODONE  2 5 mg Oral Q4H PRN Jayy Lawson PA-C      pantoprazole  40 mg Oral Early Morning Unknown [place], CRNP      prasugrel  10 mg Oral Daily Jayy Lawson PA-C      psyllium  1 packet Oral Daily Angie Gordon Quiroz      rivaroxaban  15 mg Oral Daily With [employer]      saccharomyces boulardii  250 mg Oral BID Jayy Lawson PA-C         amiodarone, 1 mg/min, Last Rate: Stopped (02/04/21 0328)    Followed by  amiodarone, 0 5 mg/min        Allergies   Allergen Reactions    Sulfa Antibiotics Anaphylaxis    Formaldehyde     Codeine Palpitations       ROS: Negative except   General ROS: positive for  - fatigue and sleep disturbance  Respiratory ROS: no cough, shortness of breath, or wheezing  Cardiovascular ROS: no chest pain or dyspnea on exertion  Gastrointestinal ROS: no abdominal pain, change in bowel habits, or black or bloody stools  Genito-Urinary ROS: no dysuria, trouble voiding, or hematuria  Musculoskeletal ROS: negative    Vitals:   Patient's heart rate was fluctuating between 120-180, blood pressure was 156/80 and 106/75 100% on 2 L      Physical Exam:  Gen:  Patient is awake and alert answering questions appropriately, normal weight  HEENT AT/AC  Neck:supple  Chest:  Bilateral clear breath sounds  Cor:  S1-S2 no murmurs , irregular irregular tachycardia  Abd:  Soft nontender  Ext:  +1 edema  Neuro:  Awake alert oriented times  Skin:  Note      Intake/Output Summary (Last 24 hours) at 2/4/2021 0536  Last data filed at 2/4/2021 0340  Gross per 24 hour   Intake 998 26 ml   Output 1225 ml   Net -226 74 ml       Respiratory    Lab Data (Last 4 hours)    None         O2/Vent Data (Last 4 hours)    None              Invasive Devices     Peripheral Intravenous Line            Peripheral IV 02/04/21 Distal;Right;Dorsal (posterior) Forearm less than 1 day    Peripheral IV 02/04/21 Left Hand less than 1 day          Drain            Urethral Catheter Other (Comment) 16 Fr  11 days                DIAGNOSTIC DATA:    Lab: I have personally reviewed pertinent lab results  CBC:   Results from last 7 days   Lab Units 02/04/21  0228   WBC Thousand/uL 8 38   HEMOGLOBIN g/dL 13 2   HEMATOCRIT % 42 4   PLATELETS Thousands/uL 245     CMP:   Results from last 7 days   Lab Units 02/04/21  0209 02/02/21  1652 02/02/21  0457  01/31/21  0027 01/30/21  0350 01/29/21  1746  01/29/21  1146   POTASSIUM mmol/L 5 2 3 8 3 6   < > 4 2 3 6  --    < >  --    CHLORIDE mmol/L 98* 103 103   < > 101 100  --    < >  --    CO2 mmol/L 27 26 30   < > 29 30  --    < >  --    CO2, I-STAT mmol/L  --   --   --   --   --   --  33*  --  30   BUN mg/dL 8 9 8   < > 5 8  --    < >  --    CREATININE mg/dL 0 82 0 59* 0 55*   < > 0 81 0 70  --    < >  --    CALCIUM mg/dL 9 0 8 6 8 6   < > 8 7 7 9*  --    < >  --    ALK PHOS U/L  --   --   --   --  79 61  --   --   --    ALT U/L  --   --   --   --  27 21  --   --   --    AST U/L  --   --   --   --  28 20  --   --   --    GLUCOSE, ISTAT mg/dl  --   --   --   --   --   --  106  --  105    < > = values in this interval not displayed  PT/INR:   No results found for: PT, INR,   Magnesium: No components found for: MAG,   Phosphorous: No results found for: PHOS    Microbiology:  Lab Results   Component Value Date    BLOODCX No Growth After 4 Days  01/30/2021    BLOODCX No Growth After 4 Days  01/30/2021    BLOODCX No Growth After 5 Days   01/13/2021    URINECX 60,000-69,000 cfu/ml Candida albicans (A) 01/22/2021    URINECX <10,000 cfu/ml Enterococcus  faecium VRE (A) 01/22/2021    URINECX No Growth <1000 cfu/mL 01/14/2021         OUTCOME:   Stayed in room   Family notified of transfer: no  Family member contacted:   Code Status: Level 3 - DNAR and DNI  Critical Care Time: Total Critical Care time spent 30 minutes excluding procedures, teaching and family updates

## 2021-02-04 NOTE — PROCEDURES
Insert PICC line    Date/Time: 2/4/2021 9:27 AM  Performed by: Humberto Hatch RN  Authorized by: Mirta Bernal DO     Patient location:  Bedside  Other Assisting Provider: Yes (comment) ([facility] [employer])    Consent:     Consent obtained:  Written (Physician obtained)    Consent given by:  Patient    Procedural risks discussed: with MD An protocol:     Procedure explained and questions answered to patient or proxy's satisfaction: yes      Relevant documents present and verified: yes      Test results available and properly labeled: yes      Radiology Images displayed and confirmed  If images not available, report reviewed: yes      Required blood products, implants, devices, and special equipment available: yes      Site/side marked: yes      Immediately prior to procedure, a time out was called: yes      Patient identity confirmed:  Verbally with patient  Pre-procedure details:     Hand hygiene: Hand hygiene performed prior to insertion      Sterile barrier technique: All elements of maximal sterile technique followed      Skin preparation:  ChloraPrep    Skin preparation agent: Skin preparation agent completely dried prior to procedure    Indications:     PICC line indications: no peripheral vascular access    Anesthesia (see MAR for exact dosages):      Anesthesia method:  Local infiltration    Local anesthetic:  Lidocaine 2% w/o epi (2 ml)  Procedure details:     Location:  Brachial    Vessel type: vein      Laterality:  Right    Site selection rationale:  Basilic vein too small    Approach: percutaneous technique used      Patient position:  Flat    Procedural supplies:  Double lumen    Catheter size:  5 Fr    Landmarks identified: yes      Ultrasound guidance: yes      Sterile ultrasound techniques: Sterile gel and sterile probe covers were used      Number of attempts:  1    Successful placement: yes      Vessel of catheter tip end:  Chest Xray needed to confirm placement    Total catheter length (cm):  38    Catheter out on skin (cm):  0    Max flow rate:  999    Arm circumference:  28  Post-procedure details:     Post-procedure:  Dressing applied and securement device placed    Assessment:  Blood return through all ports, free fluid flow and placement verification pending x-ray result    Post-procedure complications: none      Patient tolerance of procedure:   Tolerated well, no immediate complications  Comments:      Rt PICC line ok'd by Justino Way PA-C  Lot #[id number]  CXR required to confirm placement

## 2021-02-04 NOTE — ASSESSMENT & PLAN NOTE
· Recently diagnosed with a new onset AFib  · S/p rapid response yesterday    EP on board, s/p review of rhythm noted WCT  · Transitioned to amiodarone gtt  · Thought initially sotalol however contraindicated given prolonged qtc  · A/c with xarelto

## 2021-02-04 NOTE — PLAN OF CARE
Problem: PHYSICAL THERAPY ADULT  Goal: Performs mobility at highest level of function for planned discharge setting  See evaluation for individualized goals  Description: Treatment/Interventions: Functional transfer training, LE strengthening/ROM, Therapeutic exercise, Endurance training, Patient/family training, Equipment eval/education, Bed mobility, Gait training, Spoke to nursing  Equipment Recommended: Geetha Mayorga       See flowsheet documentation for full assessment, interventions and recommendations  Outcome: Progressing  Note: Prognosis: Good  Problem List: Decreased strength, Impaired balance, Decreased endurance, Decreased mobility, Decreased cognition, Decreased safety awareness  Assessment: Pt seen for PT treatment session with focus on functional transfers, functional mobility, endurance, LE therex   Pt making steady progress toward goals this session  Pt able to ambulate increased distance this session, however, continues to be limited with ambulatory distance 2/2 decreased endurance and SOB  Pt making progress with LE strength evident by need for decreased assist for STS transfers  Pt tolerated LE therex well  Vital signs WNL throughout session  Pt left upright in bedside chair with chair alarm intact and with all needs in reach  Pt will benefit from skilled therapy in order to address current impairments and functional limitations  PT to follow pt and recommending rehab once medically cleared  The patient's AM-PAC Basic Mobility Inpatient Short Form Raw Score is 17, Standardized Score is 39 67  A standardized score less than 42 9 suggests the patient may benefit from discharge to post-acute rehabilitation services  Please also refer to the recommendation of the Physical Therapist for safe discharge planning    Barriers to Discharge: Inaccessible home environment, Decreased caregiver support     PT Discharge Recommendation: [address]     PT - OK to Discharge: Yes(to rehab once medically cleared)    See flowsheet documentation for full assessment

## 2021-02-04 NOTE — PROGRESS NOTES
Progress Note - Gucci Ducor 1940, 79 y o  female MRN: [id number]    [facility]: [id number]    Primary Care Provider: Yoel Muhammad MD   Date and time admitted to hospital: 1/29/2021  3:37 PM        * Seizure-like activity [facility])  Assessment & Plan  · Rapid response initiated while pt at [facility] d/t seizure like activity  · S/p Neurology consult  · S/p EEG  · Render Christians per neurology to have MRI as outpatient; s/p CT head  · On keppra  · Sz precautions  · P[place]NDOT filed      A-fib [facility])  Assessment & Plan  · Recently diagnosed with a new onset AFib  · S/p rapid response yesterday  EP on board, s/p review of rhythm noted WCT  · Transitioned to amiodarone gtt  · Thought initially sotalol however contraindicated given prolonged qtc  · A/c with xarelto    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)  Assessment & Plan  Wt Readings from Last 3 Encounters:   02/03/21 66 kg (145 lb 8 1 oz)   01/27/21 70 kg (154 lb 4 8 oz)   01/21/21 63 7 kg (140 lb 6 4 oz)   Acute on chronic systolic/diastolic HF  Received IV lasix today  Daily dosing per cardiology pending fluid status          Urinary retention  Assessment & Plan  Continue with Saeed catheter for now  Voiding trial prior to discharge    CAD (coronary artery disease)  Assessment & Plan  On effient    VTE Pharmacologic Prophylaxis:   Pharmacologic: Rivaroxaban (Xarelto)  Mechanical VTE Prophylaxis in Place: No    Patient Centered Rounds: I have performed bedside rounds with nursing staff today  Discussions with Specialists or Other Care Team Provider: EP    Education and Discussions with Family / Patient: Patient and her     Time Spent for Care: 30 minutes  More than 50% of total time spent on counseling and coordination of care as described above      Current Length of Stay: 5 day(s)    Current Patient Status: Inpatient   Certification Statement: The patient will continue to require additional inpatient hospital stay due to Afib with rvr; D/c planning when ok per cardiology    Discharge Plan: planned on d/c to snf vs [facility]    Code Status: Level 3 - DNAR and DNI      Subjective:   No other events since rapid response yesterday which was initiated secondary to afib with rvr  Denies any cardiac symptoms    Objective:     Vitals:   Temp (24hrs), Av 4 °F (36 9 °C), Min:97 6 °F (36 4 °C), Max:99 1 °F (37 3 °C)    Temp:  [97 6 °F (36 4 °C)-99 1 °F (37 3 °C)] 98 °F (36 7 °C)  HR:  [69-81] 81  Resp:  [18-20] 20  BP: ()/(50-73) 104/73  SpO2:  [97 %-100 %] 97 %  Body mass index is 30 41 kg/m²  Input and Output Summary (last 24 hours): Intake/Output Summary (Last 24 hours) at 2/3/2021 1940  Last data filed at 2/3/2021 1905  Gross per 24 hour   Intake 998 26 ml   Output 1025 ml   Net -26 74 ml       Physical Exam:     Physical Exam  Constitutional:       Appearance: Normal appearance  Neck:      Musculoskeletal: Normal range of motion and neck supple  Cardiovascular:      Rate and Rhythm: Normal rate and regular rhythm  Pulmonary:      Effort: Pulmonary effort is normal       Breath sounds: Normal breath sounds  Abdominal:      General: Abdomen is flat  Bowel sounds are normal       Palpations: Abdomen is soft  Musculoskeletal: Normal range of motion  Skin:     General: Skin is warm and dry  Neurological:      General: No focal deficit present  Mental Status: She is alert and oriented to person, place, and time  Additional Data:     Labs:    Results from last 7 days   Lab Units 21  1652  21  0027   WBC Thousand/uL 7 88   < > 10 77*   HEMOGLOBIN g/dL 10 9*   < > 11 0*   HEMATOCRIT % 34 0*   < > 34 7*   PLATELETS Thousands/uL 229   < > 325   BANDS PCT %  --   --  4   NEUTROS PCT % 43   < >  --    LYMPHS PCT % 45*   < >  --    LYMPHO PCT %  --   --  23   MONOS PCT % 8   < >  --    MONO PCT %  --   --  8   EOS PCT % 2   < > 0    < > = values in this interval not displayed       Results from last 7 days   Lab Units 02/02/21  1652  01/31/21  0027   SODIUM mmol/L 137   < > 135*   POTASSIUM mmol/L 3 8   < > 4 2   CHLORIDE mmol/L 103   < > 101   CO2 mmol/L 26   < > 29   BUN mg/dL 9   < > 5   CREATININE mg/dL 0 59*   < > 0 81   ANION GAP mmol/L 8   < > 5   CALCIUM mg/dL 8 6   < > 8 7   ALBUMIN g/dL  --   --  2 9*   TOTAL BILIRUBIN mg/dL  --   --  1 06*   ALK PHOS U/L  --   --  79   ALT U/L  --   --  27   AST U/L  --   --  28   GLUCOSE RANDOM mg/dL 121   < > 139    < > = values in this interval not displayed  Results from last 7 days   Lab Units 01/29/21  1156   INR  2 89*     Results from last 7 days   Lab Units 02/03/21  1510 02/03/21  1122 02/03/21  0648 02/02/21  2058 02/02/21  1603 02/02/21  1053 02/02/21  0746 02/01/21  2121 02/01/21  1551 02/01/21  1118 02/01/21  0610 02/01/21  0058   POC GLUCOSE mg/dl 129 163* 118 111 119 109 98 103 119 157* 110 109         Results from last 7 days   Lab Units 02/01/21  0612 02/01/21  0419 01/31/21  0616 01/31/21  0604 01/31/21  0027 01/30/21  1220   LACTIC ACID mmol/L  --   --   --  1 1 2 5*  --    PROCALCITONIN ng/ml 0 12 0 07 <0 05  --   --  0 07           * I Have Reviewed All Lab Data Listed Above  * Additional Pertinent Lab Tests Reviewed: All Labs Within Last 24 Hours Reviewed    Imaging:    Imaging Reports Reviewed Today Include:   Imaging Personally Reviewed by Myself Includes:      Recent Cultures (last 7 days):     Results from last 7 days   Lab Units 01/30/21  1311 01/30/21  1155   BLOOD CULTURE  No Growth After 4 Days  No Growth After 4 Days         Last 24 Hours Medication List:   Current Facility-Administered Medications   Medication Dose Route Frequency Provider Last Rate    acetaminophen  650 mg Oral Q6H PRN Lady Grant PA-C      amiodarone  200 mg Oral BID With [employer] Anthony Quintero PA-C      atorvastatin  20 mg Oral Daily With [address] [facility] CARLY Chamberlain      cefdinir  300 mg Oral Q12H [address] Lady Grant PA-C      hydrALAZINE  5 mg Intravenous Q6H PRN Jayy Lawson PA-C      levETIRAcetam  500 mg Oral Q12H [address] Jayy Lawson PA-C      lidocaine (PF)  5 mL Infiltration Once Jayy Lawson PA-C      metroNIDAZOLE  500 mg Oral [facility] Jayy Lawson PA-C      ondansetron  4 mg Intravenous Q6H PRN Jayy Lawson PA-C      oxyCODONE  2 5 mg Oral Q4H PRN Jayy Lawson PA-C      pantoprazole  40 mg Oral Early Morning Unknown KATHY Huerta      prasugrel  10 mg Oral Daily Jayy Lawson PA-C      psyllium  1 packet Oral Daily Ricky Gordon      rivaroxaban  15 mg Oral Daily With [employer] Jayy Lawson PA-C      saccharomyces boulardii  250 mg Oral BID Jayy Lawson PA-C          Today, Patient Was Seen By: Jesus Richardson DO    ** Please Note: Dictation voice to text software may have been used in the creation of this document   **

## 2021-02-04 NOTE — PHYSICAL THERAPY NOTE
PHYSICAL THERAPY NOTE          Patient Name: Aleshia GRADY Date: 2/4/2021 02/04/21 1119   PT Last Visit   PT Visit Date 02/04/21   Note Type   Note Type Treatment   Pain Assessment   Pain Assessment Tool Pain Assessment not indicated - pt denies pain   Restrictions/Precautions   Weight Bearing Precautions Per Order No   Other Precautions Cognitive; Bed Alarm; Chair Alarm; Fall Risk;Telemetry;Multiple lines;Contact/isolation   General   Chart Reviewed Yes   Response to Previous Treatment Patient with no complaints from previous session  Family/Caregiver Present No   Cognition   Overall Cognitive Status Impaired   Arousal/Participation Alert   Attention Attends with cues to redirect   Orientation Level Oriented X4   Memory Decreased recall of precautions;Decreased recall of recent events   Following Commands Follows one step commands with increased time or repetition   Comments Pt with decreased safety awareness and insight into deficits, requires cues for safety throughout session  Subjective   Subjective Pt pleasant and agreeable to participate in therapy session  Bed Mobility   Additional Comments OOB in chair upon PT arrival   Pt left upright in bedside chair with chair alarm intact and all needs in reach at end of therapy session  Transfers   Sit to Stand 4  Minimal assistance   Additional items Assist x 1; Increased time required;Verbal cues   Stand to Sit 4  Minimal assistance   Additional items Assist x 1; Increased time required;Verbal cues   Additional Comments Transfers with RW   VC for hand placement and safety  Ambulation/Elevation   Gait pattern Excessively slow; Short stride; Foward flexed;Decreased foot clearance   Gait Assistance 4  Minimal assist   Additional items Assist x 1;Verbal cues; Tactile cues  (chair follow)   Assistive Device Rolling walker   Distance 25 ft x 1   Balance   Static Sitting Fair +   Dynamic Sitting Fair   Static Standing Fair -   Dynamic Standing Poor + Ambulatory Poor   Endurance Deficit   Endurance Deficit Yes   Endurance Deficit Description fatigue, SOB   Activity Tolerance   Activity Tolerance Patient limited by fatigue   Nurse Made Aware RN cleared pt to be seen by PT   Exercises   Hip Flexion Sitting;15 reps;Bilateral  (x2)   Knee AROM Long Arc Quad Sitting;15 reps;Bilateral  (x2)   Ankle Pumps Sitting;15 reps;Bilateral  (x2)   Assessment   Prognosis Good   Problem List Decreased strength; Impaired balance;Decreased endurance;Decreased mobility; Decreased cognition;Decreased safety awareness   Assessment Pt seen for PT treatment session with focus on functional transfers, functional mobility, endurance, LE therex   Pt making steady progress toward goals this session  Pt able to ambulate increased distance this session, however, continues to be limited with ambulatory distance 2/2 decreased endurance and SOB  Pt making progress with LE strength evident by need for decreased assist for STS transfers  Pt tolerated LE therex well  Vital signs WNL throughout session  Pt left upright in bedside chair with chair alarm intact and with all needs in reach  Pt will benefit from skilled therapy in order to address current impairments and functional limitations  PT to follow pt and recommending rehab once medically cleared  The patient's AM-PAC Basic Mobility Inpatient Short Form Raw Score is 17, Standardized Score is 39 67  A standardized score less than 42 9 suggests the patient may benefit from discharge to post-acute rehabilitation services  Please also refer to the recommendation of the Physical Therapist for safe discharge planning  Barriers to Discharge Inaccessible home environment;Decreased caregiver support   Goals   Patient Goals to go home   STG Expiration Date 02/15/21   Plan   Treatment/Interventions Functional transfer training;LE strengthening/ROM; Therapeutic exercise; Endurance training;Patient/family training;Equipment eval/education; Bed mobility;Gait training;Spoke to nursing   Progress Progressing toward goals   PT Frequency Other (Comment)  (3-5x/wk)   Recommendation   PT Discharge Recommendation Post-Acute Rehabilitation Services   PT - OK to Discharge Yes  (to rehab once medically cleared)   AM-PAC Basic Mobility Inpatient   Turning in Bed Without Bedrails 3   Lying on Back to Sitting on Edge of Flat Bed 3   Moving Bed to Chair 3   Standing Up From Chair 3   Walk in Room 3   Climb 3-5 Stairs 2   Basic Mobility Inpatient Raw Score 17   Basic Mobility Standardized Score 39 67     Jocelynn Rasmussen, PT, DPT

## 2021-02-04 NOTE — ASSESSMENT & PLAN NOTE
· Rapid response initiated while pt at [facility] d/t seizure like activity  · S/p Neurology consult  · S/p EEG  · Ok per neurology to have MRI as outpatient; s/p CT head  · On keppra  · Sz precautions  · PENNDOT filed

## 2021-02-04 NOTE — PLAN OF CARE
Problem: Prexisting or High Potential for Compromised Skin Integrity  Goal: Skin integrity is maintained or improved  Description: INTERVENTIONS:  - Identify patients at risk for skin breakdown  - Assess and monitor skin integrity  - Assess and monitor nutrition and hydration status  - Monitor labs   - Assess for incontinence   - Turn and reposition patient  - Assist with mobility/ambulation  - Relieve pressure over bony prominences  - Avoid friction and shearing  - Provide appropriate hygiene as needed including keeping skin clean and dry  - Evaluate need for skin moisturizer/barrier cream  - Collaborate with interdisciplinary team   - Patient/family teaching  - Consider wound care consult   Outcome: Progressing     Problem: Nutrition  Goal: Nutrition/Hydration status is improving  Description: Monitor and assess patient's nutrition/hydration status for malnutrition (ex- brittle hair, bruises, dry skin, pale skin and conjunctiva, muscle wasting, smooth red tongue, and disorientation)  Collaborate with interdisciplinary team and initiate plan and interventions as ordered  Monitor patient's weight and dietary intake as ordered or per policy  Utilize nutrition screening tool and intervene per policy  Determine patient's food preferences and provide high-protein, high-caloric foods as appropriate  - Assist patient with eating   - Allow adequate time for meals   - Encourage patient to take dietary supplement as ordered  - Collaborate with clinical nutritionist   - Include patient/family/caregiver in decisions related to nutrition    Outcome: Progressing     Problem: PAIN - ADULT  Goal: Verbalizes/displays adequate comfort level or baseline comfort level  Description: Interventions:  - Encourage patient to monitor pain and request assistance  - Assess pain using appropriate pain scale  - Administer analgesics based on type and severity of pain and evaluate response  - Implement non-pharmacological measures as appropriate and evaluate response  - Consider cultural and social influences on pain and pain management  - Notify physician/advanced practitioner if interventions unsuccessful or patient reports new pain  Outcome: Progressing     Problem: INFECTION - ADULT  Goal: Absence or prevention of progression during hospitalization  Description: INTERVENTIONS:  - Assess and monitor for signs and symptoms of infection  - Monitor lab/diagnostic results  - Monitor all insertion sites, i e  indwelling lines, tubes, and drains  - Monitor endotracheal if appropriate and nasal secretions for changes in amount and color  - [place] appropriate cooling/warming therapies per order  - Administer medications as ordered  - Instruct and encourage patient and family to use good hand hygiene technique  - Identify and instruct in appropriate isolation precautions for identified infection/condition  Outcome: Progressing  Goal: Absence of fever/infection during neutropenic period  Description: INTERVENTIONS:  - Monitor WBC    Outcome: Progressing     Problem: SAFETY ADULT  Goal: Patient will remain free of falls  Description: INTERVENTIONS:  - Assess patient frequently for physical needs  -  Identify cognitive and physical deficits and behaviors that affect risk of falls    -  [place] fall precautions as indicated by assessment   - Educate patient/family on patient safety including physical limitations  - Instruct patient to call for assistance with activity based on assessment  - Modify environment to reduce risk of injury  - Consider OT/PT consult to assist with strengthening/mobility  Outcome: Progressing  Goal: Maintain or return to baseline ADL function  Description: INTERVENTIONS:  -  Assess patient's ability to carry out ADLs; assess patient's baseline for ADL function and identify physical deficits which impact ability to perform ADLs (bathing, care of mouth/teeth, toileting, grooming, dressing, etc )  - Assess/evaluate cause of self-care deficits   - Assess range of motion  - Assess patient's mobility; develop plan if impaired  - Assess patient's need for assistive devices and provide as appropriate  - Encourage maximum independence but intervene and supervise when necessary  - Involve family in performance of ADLs  - Assess for home care needs following discharge   - Consider OT consult to assist with ADL evaluation and planning for discharge  - Provide patient education as appropriate  Outcome: Progressing  Goal: Maintain or return mobility status to optimal level  Description: INTERVENTIONS:  - Assess patient's baseline mobility status (ambulation, transfers, stairs, etc )    - Identify cognitive and physical deficits and behaviors that affect mobility  - Identify mobility aids required to assist with transfers and/or ambulation (gait belt, sit-to-stand, lift, walker, cane, etc )  - [place] fall precautions as indicated by assessment  - Record patient progress and toleration of activity level on Mobility SBAR; progress patient to next Phase/Stage  - Instruct patient to call for assistance with activity based on assessment  - Consider rehabilitation consult to assist with strengthening/weightbearing, etc   Outcome: Progressing     Problem: DISCHARGE PLANNING  Goal: Discharge to home or other facility with appropriate resources  Description: INTERVENTIONS:  - Identify barriers to discharge w/patient and caregiver  - Arrange for needed discharge resources and transportation as appropriate  - Identify discharge learning needs (meds, wound care, etc )  - Arrange for interpretive services to assist at discharge as needed  - Refer to Case Management Department for coordinating discharge planning if the patient needs post-hospital services based on physician/advanced practitioner order or complex needs related to functional status, cognitive ability, or social support system  Outcome: Progressing     Problem: Knowledge Deficit  Goal: Patient/family/caregiver demonstrates understanding of disease process, treatment plan, medications, and discharge instructions  Description: Complete learning assessment and assess knowledge base  Interventions:  - Provide teaching at level of understanding  - Provide teaching via preferred learning methods  Outcome: Progressing     Problem: Potential for Falls  Goal: Patient will remain free of falls  Description: INTERVENTIONS:  - Assess patient frequently for physical needs  -  Identify cognitive and physical deficits and behaviors that affect risk of falls  -  [place] fall precautions as indicated by assessment   - Educate patient/family on patient safety including physical limitations  - Instruct patient to call for assistance with activity based on assessment  - Modify environment to reduce risk of injury  - Consider OT/PT consult to assist with strengthening/mobility  Outcome: Progressing     Problem: Nutrition/Hydration-ADULT  Goal: Nutrient/Hydration intake appropriate for improving, restoring or maintaining nutritional needs  Description: Monitor and assess patient's nutrition/hydration status for malnutrition  Collaborate with interdisciplinary team and initiate plan and interventions as ordered  Monitor patient's weight and dietary intake as ordered or per policy  Utilize nutrition screening tool and intervene as necessary  Determine patient's food preferences and provide high-protein, high-caloric foods as appropriate       INTERVENTIONS:  - Monitor oral intake, urinary output, labs, and treatment plans  - Assess nutrition and hydration status and recommend course of action  - Evaluate amount of meals eaten  - Assist patient with eating if necessary   - Allow adequate time for meals  - Recommend/ encourage appropriate diets, oral nutritional supplements, and vitamin/mineral supplements  - Order, calculate, and assess calorie counts as needed  - Recommend, monitor, and adjust tube feedings and TPN/PPN based on assessed needs  - Assess need for intravenous fluids  - Provide specific nutrition/hydration education as appropriate  - Include patient/family/caregiver in decisions related to nutrition  Outcome: Progressing     Problem: CARDIOVASCULAR - ADULT  Goal: Maintains optimal cardiac output and hemodynamic stability  Description: INTERVENTIONS:  - Monitor I/O, vital signs and rhythm  - Monitor for S/S and trends of decreased cardiac output  - Administer and titrate ordered vasoactive medications to optimize hemodynamic stability  - Assess quality of pulses, skin color and temperature  - Assess for signs of decreased coronary artery perfusion  - Instruct patient to report change in severity of symptoms  Outcome: Progressing     Problem: RESPIRATORY - ADULT  Goal: Achieves optimal ventilation and oxygenation  Description: INTERVENTIONS:  - Assess for changes in respiratory status  - Assess for changes in mentation and behavior  - Position to facilitate oxygenation and minimize respiratory effort  - Oxygen administered by appropriate delivery if ordered  - Initiate smoking cessation education as indicated  - Encourage broncho-pulmonary hygiene including cough, deep breathe, Incentive Spirometry  - Assess the need for suctioning and aspirate as needed  - Assess and instruct to report SOB or any respiratory difficulty  - Respiratory Therapy support as indicated  Outcome: Progressing     Problem: CARDIOVASCULAR - ADULT  Goal: Absence of cardiac dysrhythmias or at baseline rhythm  Description: INTERVENTIONS:  - Continuous cardiac monitoring, vital signs, obtain 12 lead EKG if ordered  - Administer antiarrhythmic and heart rate control medications as ordered  - Monitor electrolytes and administer replacement therapy as ordered  Outcome: Not Progressing

## 2021-02-04 NOTE — ASSESSMENT & PLAN NOTE
Secondary to volume overload, daily dosing of diuretics per cardiology  Cont to monitor  No change in MS

## 2021-02-04 NOTE — CASE MANAGEMENT
Cm spoke with pt  She said she does not want to go to a facility for rehab  She wants to go home  She is agreeable to [facility], SN and OT   [facility]  Referral sent in [place]

## 2021-02-04 NOTE — PROGRESS NOTES
Progress Note - Electrophysiology-Cardiology (EP)   [place] 79 y o  female MRN: [id number]  Unit/Bed#: [facility] 531-01 Encounter: [id number]      Assessment/Plan:   Primary diagnosis:   1  Seizure like activity               * patient readmitted to \[facility]\"" on 1-29 due to witnessed seizure like activity from [facility], EP consulted on 2-3 for atrial fibrillation w/RVR leading to rapid response               * EEG negative, patient maintained on keppra per Neuro recommendations      2  Aspiration PNA               * off of IV flagyl and omnicef; afebrile, no leukocytosis               * further management per primary teams      3  Paroxysmal atrial fibrillation, symptomatic               * new diagnosis from last admission, had been on toprol XL only               * rapid response 2-2 pm due to [facility] which is likely atrial fibrillation w/ RVR              * on AC w/ xaretlo 15mg due to effient use from recent LAMINE               * electrolytes stable from labs on 2-2 during rapid, thyroid function WNL on 1-8-21               * LA size is mildly dilated with EF 50% on 2-d echo from 1-30-21              * LR increased to 70bpm on 2-3    * placed on PO amiodarone with IV being discontinued on 2-3, however on 2-3 PM again had RVR with HR's in the low 180's  IV amiodarone restarted with PO discontinued, has remained in NSR  Amiodarone PO restarted by me today, will continue this 3x daily for 2 weeks then return to 200mg daily, discussed amiodarone monitoring with family and patient extensively during consult 2-3, they understand    * recommend continue tele monitoring     4  Acute on chronic diastolic CHF              * CXR on 2-4 showing mild vas congestion    * exam much improved today, lungs CTA no O2 requirements    * negative 2 2L since 2-3, will restart PO lasix, continue BMP monitoring        Secondary diagnosis:   1  Hx LBBB   2  HTN  3  Mod pulm HTN   4  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  [phone]    Transthoracic Echocardiogram  2D, M-mode, Doppler, and Color Doppler    Study date:  03-Dec-2020    Patient: Azael Latif  MR number: [id number]  Account number: [de-identified]  : 1940  Age: 79 years  Gender: Female  Status: Inpatient  Location: Bedside  Height: 60 in  Weight: 136 lb  BP: 99/ 53 mmHg    Indications: Heart Failure    Diagnoses: I50 9 - Heart failure, unspecified    Sonographer:  Cyndie Mcneill RDCS  Primary Physician:  Ralf Singh MD  Referring Physician:  Rose Rodriguez DO  Group:  [facility]  Interpreting Physician:  Lyndsey Ji MD    SUMMARY    LEFT VENTRICLE:  Systolic function was mildly reduced  Ejection fraction was estimated to be 45 %  There was mild diffuse hypokinesis  Wall thickness was mildly to moderately increased  There was mild concentric hypertrophy  Doppler parameters were consistent with abnormal left ventricular relaxation (grade 1 diastolic dysfunction)  VENTRICULAR SEPTUM:  There was paradoxical motion  These changes are consistent with LBBB  LEFT ATRIUM:  The atrium was mildly dilated  MITRAL VALVE:  There was mild to moderate regurgitation  TRICUSPID VALVE:  There was mild regurgitation  Estimated peak PA pressure was 55 mmHg  The findings suggest moderate pulmonary hypertension  PULMONIC VALVE:  There was trace regurgitation  HISTORY: PRIOR HISTORY: CAD, Hypertension, GERD, Hyperlipidemia, CP, Stent, CABG, Raynaud's    PROCEDURE: The procedure was performed at the bedside  This was a routine study  The transthoracic approach was used   The study included complete 2D imaging, M-mode, complete spectral Doppler, and color Doppler  The heart rate was 82 bpm,  at the start of the study  Images were obtained from the parasternal, apical, subcostal, and suprasternal notch acoustic windows  Intravenous contrast ( 0 6ml Definity in NSS) was administered to opacify the left ventricle  Echocardiographic views were limited due to decreased penetration and lung interference  This was a technically difficult study  LEFT VENTRICLE: Size was normal  Systolic function was mildly reduced  Ejection fraction was estimated to be 45 %  There was mild diffuse hypokinesis  Wall thickness was mildly to moderately increased  There was mild concentric  hypertrophy  DOPPLER: Doppler parameters were consistent with abnormal left ventricular relaxation (grade 1 diastolic dysfunction)  VENTRICULAR SEPTUM: There was paradoxical motion  These changes are consistent with LBBB  RIGHT VENTRICLE: The size was normal  Systolic function was normal  Wall thickness was normal     LEFT ATRIUM: The atrium was mildly dilated  RIGHT ATRIUM: Size was normal     MITRAL VALVE: Valve structure was normal  There was normal leaflet separation  DOPPLER: The transmitral velocity was within the normal range  There was no evidence for stenosis  There was mild to moderate regurgitation  AORTIC VALVE: The valve was trileaflet  Leaflets exhibited normal thickness and normal cuspal separation  DOPPLER: Transaortic velocity was within the normal range  There was no evidence for stenosis  There was no regurgitation  TRICUSPID VALVE: The valve structure was normal  There was normal leaflet separation  DOPPLER: The transtricuspid velocity was within the normal range  There was no evidence for stenosis  There was mild regurgitation  Estimated peak PA  pressure was 55 mmHg  The findings suggest moderate pulmonary hypertension      PULMONIC VALVE: Leaflets exhibited normal thickness, no calcification, and normal cuspal separation  DOPPLER: The transpulmonic velocity was within the normal range  There was trace regurgitation  PERICARDIUM: There was no pericardial effusion  The pericardium was normal in appearance  AORTA: The root exhibited normal size  SYSTEM MEASUREMENT TABLES    2D  %FS: 17 94 %  Ao Diam: 2 72 cm  Ao asc: 2 46 cm  EDV(Teich): 55 79 ml  EF(Teich): 38 05 %  ESV(Teich): 34 57 ml  IVSd: 1 08 cm  LA Area: 16 14 cm2  LA Diam: 3 04 cm  LVEDV MOD A4C: 52 91 ml  LVEF MOD A4C: 38 16 %  LVESV MOD A4C: 32 72 ml  LVIDd: 3 64 cm  LVIDs: 2 98 cm  LVLd A4C: 6 09 cm  LVLs A4C: 5 64 cm  LVPWd: 0 93 cm  RA Area: 7 24 cm2  RVIDd: 2 34 cm  SV MOD A4C: 20 19 ml  SV(Teich): 21 23 ml    CW  TR Vmax: 3 73 m/s  TR maxP 52 mmHg    MM  TAPSE: 2 12 cm    PW  E' Sept: 0 04 m/s  E/E' Sept: 21 24  MV A Nicola: 1 42 m/s  MV Dec [place]: 4 42 m/s2  MV DecT: 191 8 ms  MV E Nicola: 0 85 m/s  MV E/A Ratio: 0 6  MV PHT: 55 62 ms  MVA By PHT: 3 96 cm2    [employer] Accredited Echocardiography Laboratory    Prepared and electronically signed by    Marc Holcomb MD  Signed 03-Dec-2020 13:58:50         Subjective/Objective   Subjective: Today patient is followed by EP regarding AF management  She is questioning pathophysiology of AF otherwise has no concerns or complaints  Did not have major symptoms during tachycardia last evening       Vitals: /62 (BP Location: Right arm)   Pulse 81   Temp 99 °F (37 2 °C) (Oral)   Resp 14   Wt 66 kg (145 lb 8 1 oz)   SpO2 97%   BMI 30 41 kg/m²   Vitals:    21 0600 21 0600   Weight: 65 kg (143 lb 4 8 oz) 66 kg (145 lb 8 1 oz)     Orthostatic Blood Pressures      Most Recent Value   Blood Pressure  105/62 filed at 2021 1500   Patient Position - Orthostatic VS  Lying filed at 2021 1500            Intake/Output Summary (Last 24 hours) at 2021 1609  Last data filed at 2021 1345  Gross per 24 hour   Intake 470 ml   Output 650 ml   Net -180 ml Invasive Devices     Peripherally Inserted Central Catheter Line            PICC Line 02/04/21 Right Brachial less than 1 day          Peripheral Intravenous Line            Peripheral IV 02/04/21 Distal;Right;Dorsal (posterior) Forearm less than 1 day    Peripheral IV 02/04/21 Left Hand less than 1 day          Drain            Urethral Catheter Other (Comment) 16 Fr  12 days                            Scheduled Meds:  Current Facility-Administered Medications   Medication Dose Route Frequency Provider Last Rate    acetaminophen  650 mg Oral Q6H PRN Marlen Life, PA-C      amiodarone  0 5 mg/min Intravenous Continuous DYANA Conti-C 0 5 mg/min (02/04/21 1156)    amiodarone  200 mg Oral TID With Meals Anthony Friend, PA-KATHIE      atorvastatin  20 mg Oral Daily With DYANA Avalos-KATHIE      cefdinir  300 mg Oral Q12H [address] Marlen Life, PA-C      hydrALAZINE  5 mg Intravenous Q6H PRN Mareln Life, PA-C      levETIRAcetam  500 mg Oral Q12H [address] Marlen Life, PA-C      lidocaine (PF)  5 mL Infiltration Once Marlen Life, PA-C      ondansetron  4 mg Intravenous Q6H PRN Marlen Life, PA-C      oxyCODONE  2 5 mg Oral Q4H PRN Marlen Life, PA-C      pantoprazole  40 mg Oral Early Morning KATHY Macias      prasugrel  10 mg Oral Daily Marlen Life, PA-C      psyllium  1 packet Oral Daily Marlen Life, PA-C      rivaroxaban  15 mg Oral Daily With Faith Garcia, PA-KATHIE      saccharomyces boulardii  250 mg Oral BID Marlen Life, PA-C       Continuous Infusions:amiodarone, 0 5 mg/min, Last Rate: 0 5 mg/min (02/04/21 1156)      PRN Meds:   acetaminophen    hydrALAZINE    ondansetron    oxyCODONE    Physical Exam:   GEN: NAD, alert and oriented, well appearing  SKIN: dry without significant lesions or rashes  HEENT: NCAT, PERRL, EOMs intact  NECK: No JVD or carotid bruits appreciated  CARDIOVASCULAR: RRR, normal S1, S2 without murmurs, rubs, or gallops appreciated  LUNGS: Clear to auscultation bilaterally without wheezes, rhonchi, or rales  ABDOMEN: Soft, nontender, nondistended  EXTREMITIES/VASCULAR: perfused without clubbing, cyanosis, or edema b/l  PSYCH: Normal mood and affect  NEURO: CN ll-Xll grossly intact                Lab Results: I have personally reviewed pertinent lab results  Results from last 7 days   Lab Units 02/04/21 0228 02/02/21 1652 02/02/21 0457   WBC Thousand/uL 8 38 7 88 7 25   HEMOGLOBIN g/dL 13 2 10 9* 10 4*   HEMATOCRIT % 42 4 34 0* 33 4*   PLATELETS Thousands/uL 245 229 236     Results from last 7 days   Lab Units 02/04/21  0209 02/02/21 1652 02/02/21 0457 01/29/21  1746   POTASSIUM mmol/L 5 2 3 8 3 6   < >  --    CHLORIDE mmol/L 98* 103 103   < >  --    CO2 mmol/L 27 26 30   < >  --    CO2, I-STAT mmol/L  --   --   --   --  33*   BUN mg/dL 8 9 8   < >  --    CREATININE mg/dL 0 82 0 59* 0 55*   < >  --    GLUCOSE, ISTAT mg/dl  --   --   --   --  106   CALCIUM mg/dL 9 0 8 6 8 6   < >  --     < > = values in this interval not displayed       Results from last 7 days   Lab Units 01/29/21  1156   INR  2 89*   PTT seconds 56*     Results from last 7 days   Lab Units 02/04/21 0209 02/02/21 1652 02/02/21 0457   MAGNESIUM mg/dL 2 0 3 6* 1 8

## 2021-02-04 NOTE — ASSESSMENT & PLAN NOTE
· Rapid response initiated while pt was at [facility] d/t seizure like activity  · S/p Neurology consult  · S/p EEG  · Not sure if able to have MRI as positive PPM; s/p CT head  · On keppra BID  · Sz precautions  · PENNDOT filed per neurology  · No recurring sz like activity

## 2021-02-04 NOTE — ASSESSMENT & PLAN NOTE
Wt Readings from Last 3 Encounters:   02/03/21 66 kg (145 lb 8 1 oz)   01/27/21 70 kg (154 lb 4 8 oz)   01/21/21 63 7 kg (140 lb 6 4 oz)   Acute on chronic systolic/diastolic HF, ef of 33%  Daily dosing of diuretics per cardiology pending fluid status  Cont I/O, daily wts

## 2021-02-05 LAB
ANION GAP SERPL CALCULATED.3IONS-SCNC: 4 MMOL/L (ref 4–13)
ANION GAP SERPL CALCULATED.3IONS-SCNC: 5 MMOL/L (ref 4–13)
ANION GAP SERPL CALCULATED.3IONS-SCNC: 5 MMOL/L (ref 4–13)
BUN SERPL-MCNC: 6 MG/DL (ref 5–25)
BUN SERPL-MCNC: 7 MG/DL (ref 5–25)
BUN SERPL-MCNC: 7 MG/DL (ref 5–25)
CALCIUM SERPL-MCNC: 8.2 MG/DL (ref 8.3–10.1)
CALCIUM SERPL-MCNC: 8.4 MG/DL (ref 8.3–10.1)
CALCIUM SERPL-MCNC: 8.5 MG/DL (ref 8.3–10.1)
CHLORIDE SERPL-SCNC: 101 MMOL/L (ref 100–108)
CHLORIDE SERPL-SCNC: 102 MMOL/L (ref 100–108)
CHLORIDE SERPL-SCNC: 96 MMOL/L (ref 100–108)
CO2 SERPL-SCNC: 30 MMOL/L (ref 21–32)
CO2 SERPL-SCNC: 30 MMOL/L (ref 21–32)
CO2 SERPL-SCNC: 32 MMOL/L (ref 21–32)
CREAT SERPL-MCNC: 0.58 MG/DL (ref 0.6–1.3)
CREAT SERPL-MCNC: 0.62 MG/DL (ref 0.6–1.3)
CREAT SERPL-MCNC: 0.71 MG/DL (ref 0.6–1.3)
GFR SERPL CREATININE-BSD FRML MDRD: 80 ML/MIN/1.73SQ M
GFR SERPL CREATININE-BSD FRML MDRD: 85 ML/MIN/1.73SQ M
GFR SERPL CREATININE-BSD FRML MDRD: 87 ML/MIN/1.73SQ M
GLUCOSE SERPL-MCNC: 113 MG/DL (ref 65–140)
GLUCOSE SERPL-MCNC: 113 MG/DL (ref 65–140)
GLUCOSE SERPL-MCNC: 120 MG/DL (ref 65–140)
GLUCOSE SERPL-MCNC: 121 MG/DL (ref 65–140)
GLUCOSE SERPL-MCNC: 141 MG/DL (ref 65–140)
GLUCOSE SERPL-MCNC: 147 MG/DL (ref 65–140)
GLUCOSE SERPL-MCNC: 166 MG/DL (ref 65–140)
MAGNESIUM SERPL-MCNC: 1.6 MG/DL (ref 1.6–2.6)
POTASSIUM SERPL-SCNC: 3.1 MMOL/L (ref 3.5–5.3)
POTASSIUM SERPL-SCNC: 3.5 MMOL/L (ref 3.5–5.3)
POTASSIUM SERPL-SCNC: 3.5 MMOL/L (ref 3.5–5.3)
SODIUM SERPL-SCNC: 132 MMOL/L (ref 136–145)
SODIUM SERPL-SCNC: 136 MMOL/L (ref 136–145)
SODIUM SERPL-SCNC: 137 MMOL/L (ref 136–145)

## 2021-02-05 PROCEDURE — 99232 SBSQ HOSP IP/OBS MODERATE 35: CPT | Performed by: INTERNAL MEDICINE

## 2021-02-05 PROCEDURE — 80048 BASIC METABOLIC PNL TOTAL CA: CPT | Performed by: PHYSICIAN ASSISTANT

## 2021-02-05 PROCEDURE — 99233 SBSQ HOSP IP/OBS HIGH 50: CPT | Performed by: INTERNAL MEDICINE

## 2021-02-05 PROCEDURE — 83735 ASSAY OF MAGNESIUM: CPT | Performed by: INTERNAL MEDICINE

## 2021-02-05 PROCEDURE — 92526 ORAL FUNCTION THERAPY: CPT

## 2021-02-05 PROCEDURE — 99024 POSTOP FOLLOW-UP VISIT: CPT | Performed by: INTERNAL MEDICINE

## 2021-02-05 PROCEDURE — 82948 REAGENT STRIP/BLOOD GLUCOSE: CPT

## 2021-02-05 PROCEDURE — 80048 BASIC METABOLIC PNL TOTAL CA: CPT | Performed by: INTERNAL MEDICINE

## 2021-02-05 RX ORDER — LANOLIN ALCOHOL/MO/W.PET/CERES
3 CREAM (GRAM) TOPICAL
Status: DISCONTINUED | OUTPATIENT
Start: 2021-02-05 | End: 2021-02-08

## 2021-02-05 RX ORDER — MAGNESIUM SULFATE HEPTAHYDRATE 40 MG/ML
2 INJECTION, SOLUTION INTRAVENOUS ONCE
Status: COMPLETED | OUTPATIENT
Start: 2021-02-05 | End: 2021-02-05

## 2021-02-05 RX ORDER — POTASSIUM CHLORIDE 20 MEQ/1
40 TABLET, EXTENDED RELEASE ORAL ONCE
Status: COMPLETED | OUTPATIENT
Start: 2021-02-05 | End: 2021-02-05

## 2021-02-05 RX ORDER — DIGOXIN 0.25 MG/ML
500 INJECTION INTRAMUSCULAR; INTRAVENOUS ONCE
Status: COMPLETED | OUTPATIENT
Start: 2021-02-05 | End: 2021-02-05

## 2021-02-05 RX ORDER — BUMETANIDE 0.25 MG/ML
2 INJECTION, SOLUTION INTRAMUSCULAR; INTRAVENOUS
Status: COMPLETED | OUTPATIENT
Start: 2021-02-05 | End: 2021-02-07

## 2021-02-05 RX ORDER — METOPROLOL SUCCINATE 25 MG/1
25 TABLET, EXTENDED RELEASE ORAL DAILY
Status: DISCONTINUED | OUTPATIENT
Start: 2021-02-05 | End: 2021-02-13 | Stop reason: HOSPADM

## 2021-02-05 RX ADMIN — METOPROLOL SUCCINATE 25 MG: 25 TABLET, EXTENDED RELEASE ORAL at 12:13

## 2021-02-05 RX ADMIN — MAGNESIUM SULFATE HEPTAHYDRATE 2 G: 40 INJECTION, SOLUTION INTRAVENOUS at 12:16

## 2021-02-05 RX ADMIN — LEVETIRACETAM 500 MG: 500 TABLET, FILM COATED ORAL at 20:00

## 2021-02-05 RX ADMIN — AMIODARONE HYDROCHLORIDE 200 MG: 200 TABLET ORAL at 09:43

## 2021-02-05 RX ADMIN — POTASSIUM CHLORIDE 40 MEQ: 1500 TABLET, EXTENDED RELEASE ORAL at 19:58

## 2021-02-05 RX ADMIN — Medication 250 MG: at 17:33

## 2021-02-05 RX ADMIN — ATORVASTATIN CALCIUM 20 MG: 20 TABLET, FILM COATED ORAL at 17:33

## 2021-02-05 RX ADMIN — BUMETANIDE 2 MG: 0.25 INJECTION INTRAMUSCULAR; INTRAVENOUS at 17:33

## 2021-02-05 RX ADMIN — Medication 250 MG: at 09:43

## 2021-02-05 RX ADMIN — AMIODARONE HYDROCHLORIDE 200 MG: 200 TABLET ORAL at 12:10

## 2021-02-05 RX ADMIN — RIVAROXABAN 15 MG: 15 TABLET, FILM COATED ORAL at 17:32

## 2021-02-05 RX ADMIN — AMIODARONE HYDROCHLORIDE 1 MG/MIN: 50 INJECTION, SOLUTION INTRAVENOUS at 15:30

## 2021-02-05 RX ADMIN — PANTOPRAZOLE SODIUM 40 MG: 40 TABLET, DELAYED RELEASE ORAL at 05:45

## 2021-02-05 RX ADMIN — MELATONIN 3 MG: at 23:46

## 2021-02-05 RX ADMIN — LEVETIRACETAM 500 MG: 500 TABLET, FILM COATED ORAL at 09:43

## 2021-02-05 RX ADMIN — Medication 1 PACKET: at 09:44

## 2021-02-05 RX ADMIN — PRASUGREL HYDROCHLORIDE 10 MG: 10 TABLET, FILM COATED ORAL at 09:43

## 2021-02-05 RX ADMIN — FUROSEMIDE 40 MG: 40 TABLET ORAL at 09:43

## 2021-02-05 RX ADMIN — DIGOXIN 500 MCG: 0.25 INJECTION INTRAMUSCULAR; INTRAVENOUS at 12:24

## 2021-02-05 RX ADMIN — AMIODARONE HYDROCHLORIDE 200 MG: 200 TABLET ORAL at 17:35

## 2021-02-05 NOTE — PLAN OF CARE
Problem: Prexisting or High Potential for Compromised Skin Integrity  Goal: Skin integrity is maintained or improved  Description: INTERVENTIONS:  - Identify patients at risk for skin breakdown  - Assess and monitor skin integrity  - Assess and monitor nutrition and hydration status  - Monitor labs   - Assess for incontinence   - Turn and reposition patient  - Assist with mobility/ambulation  - Relieve pressure over bony prominences  - Avoid friction and shearing  - Provide appropriate hygiene as needed including keeping skin clean and dry  - Evaluate need for skin moisturizer/barrier cream  - Collaborate with interdisciplinary team   - Patient/family teaching  - Consider wound care consult   Outcome: Progressing     Problem: Neurological Deficit  Goal: Neurological status is stable or improving  Description: Interventions:  - Monitor and assess patient's level of consciousness, motor function, sensory function, and level of assistance needed for ADLs  - Monitor and report changes from baseline  Collaborate with interdisciplinary team to initiate plan and implement interventions as ordered  - Provide and maintain a safe environment  - Consider seizure precautions  - Consider fall precautions  - Consider aspiration precautions  - Consider bleeding precautions  Outcome: Progressing     Problem: Activity Intolerance/Impaired Mobility  Goal: Mobility/activity is maintained at optimum level for patient  Description: Interventions:  - Assess and monitor patient  barriers to mobility and need for assistive/adaptive devices  - Assess patient's emotional response to limitations  - Collaborate with interdisciplinary team and initiate plans and interventions as ordered  - Encourage independent activity per ability   - Maintain proper body alignment  - Perform active/passive rom as tolerated/ordered    - Plan activities to conserve energy   - Turn patient as appropriate  Outcome: Progressing     Problem: Communication Impairment  Goal: Ability to express needs and understand communication  Description: Assess patient's communication skills and ability to understand information  Patient will demonstrate use of effective communication techniques, alternative methods of communication and understanding even if not able to speak  - Encourage communication and provide alternate methods of communication as needed  - Collaborate with case management/ for discharge needs  - Include patient/family/caregiver in decisions related to communication  Outcome: Progressing     Problem: Potential for Aspiration  Goal: Non-ventilated patient's risk of aspiration is minimized  Description: Assess and monitor vital signs, respiratory status, and labs (WBC)  Monitor for signs of aspiration (tachypnea, cough, rales, wheezing, cyanosis, fever)  - Assess and monitor patient's ability to swallow  - Place patient up in chair to eat if possible  - HOB up at 90 degrees to eat if unable to get patient up into chair   - Supervise patient during oral intake  - Instruct patient/ family to take small bites  - Instruct patient/ family to take small single sips when taking liquids  - Follow patient-specific strategies generated by speech pathologist   Outcome: Progressing     Problem: Nutrition  Goal: Nutrition/Hydration status is improving  Description: Monitor and assess patient's nutrition/hydration status for malnutrition (ex- brittle hair, bruises, dry skin, pale skin and conjunctiva, muscle wasting, smooth red tongue, and disorientation)  Collaborate with interdisciplinary team and initiate plan and interventions as ordered  Monitor patient's weight and dietary intake as ordered or per policy  Utilize nutrition screening tool and intervene per policy  Determine patient's food preferences and provide high-protein, high-caloric foods as appropriate       - Assist patient with eating   - Allow adequate time for meals   - Encourage patient to take dietary supplement as ordered  - Collaborate with clinical nutritionist   - Include patient/family/caregiver in decisions related to nutrition  Outcome: Progressing     Problem: PAIN - ADULT  Goal: Verbalizes/displays adequate comfort level or baseline comfort level  Description: Interventions:  - Encourage patient to monitor pain and request assistance  - Assess pain using appropriate pain scale  - Administer analgesics based on type and severity of pain and evaluate response  - Implement non-pharmacological measures as appropriate and evaluate response  - Consider cultural and social influences on pain and pain management  - Notify physician/advanced practitioner if interventions unsuccessful or patient reports new pain  Outcome: Progressing     Problem: INFECTION - ADULT  Goal: Absence or prevention of progression during hospitalization  Description: INTERVENTIONS:  - Assess and monitor for signs and symptoms of infection  - Monitor lab/diagnostic results  - Monitor all insertion sites, i e  indwelling lines, tubes, and drains  - Monitor endotracheal if appropriate and nasal secretions for changes in amount and color  - [place] appropriate cooling/warming therapies per order  - Administer medications as ordered  - Instruct and encourage patient and family to use good hand hygiene technique  - Identify and instruct in appropriate isolation precautions for identified infection/condition  Outcome: Progressing  Goal: Absence of fever/infection during neutropenic period  Description: INTERVENTIONS:  - Monitor WBC    Outcome: Progressing     Problem: SAFETY ADULT  Goal: Patient will remain free of falls  Description: INTERVENTIONS:  - Assess patient frequently for physical needs  -  Identify cognitive and physical deficits and behaviors that affect risk of falls    -  [place] fall precautions as indicated by assessment   - Educate patient/family on patient safety including physical limitations  - Instruct patient to call for assistance with activity based on assessment  - Modify environment to reduce risk of injury  - Consider OT/PT consult to assist with strengthening/mobility  Outcome: Progressing  Goal: Maintain or return to baseline ADL function  Description: INTERVENTIONS:  -  Assess patient's ability to carry out ADLs; assess patient's baseline for ADL function and identify physical deficits which impact ability to perform ADLs (bathing, care of mouth/teeth, toileting, grooming, dressing, etc )  - Assess/evaluate cause of self-care deficits   - Assess range of motion  - Assess patient's mobility; develop plan if impaired  - Assess patient's need for assistive devices and provide as appropriate  - Encourage maximum independence but intervene and supervise when necessary  - Involve family in performance of ADLs  - Assess for home care needs following discharge   - Consider OT consult to assist with ADL evaluation and planning for discharge  - Provide patient education as appropriate  Outcome: Progressing  Goal: Maintain or return mobility status to optimal level  Description: INTERVENTIONS:  - Assess patient's baseline mobility status (ambulation, transfers, stairs, etc )    - Identify cognitive and physical deficits and behaviors that affect mobility  - Identify mobility aids required to assist with transfers and/or ambulation (gait belt, sit-to-stand, lift, walker, cane, etc )  - [place] fall precautions as indicated by assessment  - Record patient progress and toleration of activity level on Mobility SBAR; progress patient to next Phase/Stage  - Instruct patient to call for assistance with activity based on assessment  - Consider rehabilitation consult to assist with strengthening/weightbearing, etc   Outcome: Progressing     Problem: DISCHARGE PLANNING  Goal: Discharge to home or other facility with appropriate resources  Description: INTERVENTIONS:  - Identify barriers to discharge w/patient and caregiver  - Arrange for needed discharge resources and transportation as appropriate  - Identify discharge learning needs (meds, wound care, etc )  - Arrange for interpretive services to assist at discharge as needed  - Refer to Case Management Department for coordinating discharge planning if the patient needs post-hospital services based on physician/advanced practitioner order or complex needs related to functional status, cognitive ability, or social support system  Outcome: Progressing     Problem: Knowledge Deficit  Goal: Patient/family/caregiver demonstrates understanding of disease process, treatment plan, medications, and discharge instructions  Description: Complete learning assessment and assess knowledge base  Interventions:  - Provide teaching at level of understanding  - Provide teaching via preferred learning methods  Outcome: Progressing     Problem: Potential for Falls  Goal: Patient will remain free of falls  Description: INTERVENTIONS:  - Assess patient frequently for physical needs  -  Identify cognitive and physical deficits and behaviors that affect risk of falls  -  [place] fall precautions as indicated by assessment   - Educate patient/family on patient safety including physical limitations  - Instruct patient to call for assistance with activity based on assessment  - Modify environment to reduce risk of injury  - Consider OT/PT consult to assist with strengthening/mobility  Outcome: Progressing     Problem: Nutrition/Hydration-ADULT  Goal: Nutrient/Hydration intake appropriate for improving, restoring or maintaining nutritional needs  Description: Monitor and assess patient's nutrition/hydration status for malnutrition  Collaborate with interdisciplinary team and initiate plan and interventions as ordered  Monitor patient's weight and dietary intake as ordered or per policy  Utilize nutrition screening tool and intervene as necessary   Determine patient's food preferences and provide high-protein, high-caloric foods as appropriate       INTERVENTIONS:  - Monitor oral intake, urinary output, labs, and treatment plans  - Assess nutrition and hydration status and recommend course of action  - Evaluate amount of meals eaten  - Assist patient with eating if necessary   - Allow adequate time for meals  - Recommend/ encourage appropriate diets, oral nutritional supplements, and vitamin/mineral supplements  - Order, calculate, and assess calorie counts as needed  - Recommend, monitor, and adjust tube feedings and TPN/PPN based on assessed needs  - Assess need for intravenous fluids  - Provide specific nutrition/hydration education as appropriate  - Include patient/family/caregiver in decisions related to nutrition  Outcome: Progressing     Problem: CARDIOVASCULAR - ADULT  Goal: Maintains optimal cardiac output and hemodynamic stability  Description: INTERVENTIONS:  - Monitor I/O, vital signs and rhythm  - Monitor for S/S and trends of decreased cardiac output  - Administer and titrate ordered vasoactive medications to optimize hemodynamic stability  - Assess quality of pulses, skin color and temperature  - Assess for signs of decreased coronary artery perfusion  - Instruct patient to report change in severity of symptoms  Outcome: Progressing  Goal: Absence of cardiac dysrhythmias or at baseline rhythm  Description: INTERVENTIONS:  - Continuous cardiac monitoring, vital signs, obtain 12 lead EKG if ordered  - Administer antiarrhythmic and heart rate control medications as ordered  - Monitor electrolytes and administer replacement therapy as ordered  Outcome: Progressing     Problem: RESPIRATORY - ADULT  Goal: Achieves optimal ventilation and oxygenation  Description: INTERVENTIONS:  - Assess for changes in respiratory status  - Assess for changes in mentation and behavior  - Position to facilitate oxygenation and minimize respiratory effort  - Oxygen administered by appropriate delivery if ordered  - Initiate smoking cessation education as indicated  - Encourage broncho-pulmonary hygiene including cough, deep breathe, Incentive Spirometry  - Assess the need for suctioning and aspirate as needed  - Assess and instruct to report SOB or any respiratory difficulty  - Respiratory Therapy support as indicated  Outcome: Progressing

## 2021-02-05 NOTE — PROGRESS NOTES
Progress Note - Electrophysiology-Cardiology (EP)   Jordan Kumar 79 y o  female MRN: [id number]  Unit/Bed#: [facility] 531-01 Encounter: [id number]      Assessment/Plan:   Primary diagnosis:   1  Seizure like activity               * patient readmitted to [facility] on 1-29 due to witnessed seizure like activity from [facility], EP consulted on 2-3 for atrial fibrillation w/RVR leading to rapid response               * EEG negative, patient maintained on keppra per Neuro recommendations      2  Aspiration PNA               * on IV flagyl and omnicef; afebrile, no leukocytosis               * further management per primary teams      3  Paroxysmal atrial fibrillation, symptomatic               * new diagnosis from last admission, had been on toprol XL only               * rapid response on 2-2 and 2-3pm due to [facility] which is likely atrial fibrillation w/ RVR, now on IV amiodarone and in NSR               * [facility] w/ xaretlo 15mg due to effient use from recent LAMINE               * electrolytes stable from labs on 2-2 during rapid, thyroid function WNL on 1-8-21               * LA size is mildly dilated with EF 50% on 2-d echo from 1-30-21              * PO amiodarone restarted on 2-4 as this was discontinued from rapid on 2-3, she has only received about 3 grams of amiodarone IV + PO thus far, she will need a 10 gram load for full effectiveness  If she continues to have RVR despite amiodarone loading need to discuss AVJ vs cryo PVI    * did have RVR early 2-5 AM for 20 minutes    * she currently is in RVR with 's, she is completely asymptomatic with no symptoms, BB was on hold due to borderline hypotension, will re add to her medical regimen at 25mg once daily with BP holds, this will also benefit her from atherosclerosis standpoint, also will give 500mcg IV digoxin and observe       4  Acute on chronic diastolic CHF              * CXR 2-2 showing mild vascular congestion, gave lasix 20mg IV x 1 on on 2-3 then on 2-4 she had no rales so I restarted PO   * on 2- she had LE edema and faint rales but normal O2 sats    * from RVR she develops volume overload which likely continues to exacerbate her AF, will have general cardiology follow for further volume management     Secondary diagnosis:   1  Hx LBBB   2  HTN  3  Mod pulm HTN   4  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 -   5  B/l renal artery stenosis   6  HLD   7  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)       EKG:   See prior notes     Imaging: I have personally reviewed pertinent reports  Results for orders placed during the hospital encounter of 20   Echo complete with contrast if indicated    Narrative [facility]  [facility] [place], [address] [facility]  [phone]    Transthoracic Echocardiogram  2D, M-mode, Doppler, and Color Doppler    Study date:  03-Dec-2020    Patient: Eddie Macedo  MR number: [id number]  Account number: [de-identified]  : 1940  Age: 79 years  Gender: Female  Status: Inpatient  Location: Bedside  Height: 60 in  Weight: 136 lb  BP: 99/ 53 mmHg    Indications: Heart Failure    Diagnoses: I50 9 - Heart failure, unspecified    Sonographer:  Alyssa Lopez RDCS  Primary Physician:  Celena Manrique MD  Referring Physician:  Francisco Escobar DO  Group:  [id number]  Interpreting Physician:  Apollo Turner MD    SUMMARY    LEFT VENTRICLE:  Systolic function was mildly reduced  Ejection fraction was estimated to be 45 %  There was mild diffuse hypokinesis  Wall thickness was mildly to moderately increased  There was mild concentric hypertrophy  Doppler parameters were consistent with abnormal left ventricular relaxation (grade 1 diastolic dysfunction)  VENTRICULAR SEPTUM:  There was paradoxical motion  These changes are consistent with LBBB  LEFT ATRIUM:  The atrium was mildly dilated  MITRAL VALVE:  There was mild to moderate regurgitation  TRICUSPID VALVE:  There was mild regurgitation    Estimated peak PA pressure was 55 mmHg  The findings suggest moderate pulmonary hypertension  PULMONIC VALVE:  There was trace regurgitation  HISTORY: PRIOR HISTORY: CAD, Hypertension, GERD, Hyperlipidemia, CP, Stent, CABG, Raynaud's    PROCEDURE: The procedure was performed at the bedside  This was a routine study  The transthoracic approach was used  The study included complete 2D imaging, M-mode, complete spectral Doppler, and color Doppler  The heart rate was 82 bpm,  at the start of the study  Images were obtained from the parasternal, apical, subcostal, and suprasternal notch acoustic windows  Intravenous contrast ( 0 6ml Definity in NSS) was administered to opacify the left ventricle  Echocardiographic views were limited due to decreased penetration and lung interference  This was a technically difficult study  LEFT VENTRICLE: Size was normal  Systolic function was mildly reduced  Ejection fraction was estimated to be 45 %  There was mild diffuse hypokinesis  Wall thickness was mildly to moderately increased  There was mild concentric  hypertrophy  DOPPLER: Doppler parameters were consistent with abnormal left ventricular relaxation (grade 1 diastolic dysfunction)  VENTRICULAR SEPTUM: There was paradoxical motion  These changes are consistent with LBBB  RIGHT VENTRICLE: The size was normal  Systolic function was normal  Wall thickness was normal     LEFT ATRIUM: The atrium was mildly dilated  RIGHT ATRIUM: Size was normal     MITRAL VALVE: Valve structure was normal  There was normal leaflet separation  DOPPLER: The transmitral velocity was within the normal range  There was no evidence for stenosis  There was mild to moderate regurgitation  AORTIC VALVE: The valve was trileaflet  Leaflets exhibited normal thickness and normal cuspal separation  DOPPLER: Transaortic velocity was within the normal range  There was no evidence for stenosis  There was no regurgitation      TRICUSPID VALVE: The valve structure was normal  There was normal leaflet separation  DOPPLER: The transtricuspid velocity was within the normal range  There was no evidence for stenosis  There was mild regurgitation  Estimated peak PA  pressure was 55 mmHg  The findings suggest moderate pulmonary hypertension  PULMONIC VALVE: Leaflets exhibited normal thickness, no calcification, and normal cuspal separation  DOPPLER: The transpulmonic velocity was within the normal range  There was trace regurgitation  PERICARDIUM: There was no pericardial effusion  The pericardium was normal in appearance  AORTA: The root exhibited normal size  SYSTEM MEASUREMENT TABLES    2D  %FS: 17 94 %  Ao Diam: 2 72 cm  Ao asc: 2 46 cm  EDV(Teich): 55 79 ml  EF(Teich): 38 05 %  ESV(Teich): 34 57 ml  IVSd: 1 08 cm  LA Area: 16 14 cm2  LA Diam: 3 04 cm  LVEDV MOD A4C: 52 91 ml  LVEF MOD A4C: 38 16 %  LVESV MOD A4C: 32 72 ml  LVIDd: 3 64 cm  LVIDs: 2 98 cm  LVLd A4C: 6 09 cm  LVLs A4C: 5 64 cm  LVPWd: 0 93 cm  RA Area: 7 24 cm2  RVIDd: 2 34 cm  SV MOD A4C: 20 19 ml  SV(Teich): 21 23 ml    CW  TR Vmax: 3 73 m/s  TR maxP 52 mmHg    MM  TAPSE: 2 12 cm    PW  E' Sept: 0 04 m/s  E/E' Sept: 21 24  MV A Nicola: 1 42 m/s  MV Dec Garden: 4 42 m/s2  MV DecT: 191 8 ms  MV E Nicola: 0 85 m/s  MV E/A Ratio: 0 6  MV PHT: 55 62 ms  MVA By PHT: 3 96 cm2    [employer] Accredited Echocardiography Laboratory    Prepared and electronically signed by    Marium Garcia MD  Signed 03-Dec-2020 13:58:50         Subjective/Objective   Subjective: Today patient seen by EP for further AF management  She has no concerns or complaints today, she continues to feel well and understands need to go to rehab prior to returning home       Vitals: /76   Pulse (!) 168   Temp 98 5 °F (36 9 °C)   Resp 19   Wt 66 2 kg (145 lb 15 1 oz)   SpO2 95%   BMI 30 50 kg/m²   Vitals:    21 0600 21 0547   Weight: 66 kg (145 lb 8 1 oz) 66 2 kg (145 lb 15 1 oz) Orthostatic Blood Pressures      Most Recent Value   Blood Pressure  138/76 filed at 02/05/2021 1245   Patient Position - Orthostatic VS  Lying filed at 02/04/2021 2308            Intake/Output Summary (Last 24 hours) at 2/5/2021 1307  Last data filed at 2/5/2021 0945  Gross per 24 hour   Intake 420 ml   Output 1750 ml   Net -1330 ml       Invasive Devices     Peripherally Inserted Central Catheter Line            PICC Line 02/04/21 Right Brachial 1 day          Peripheral Intravenous Line            Peripheral IV 02/04/21 Distal;Right;Dorsal (posterior) Forearm 1 day    Peripheral IV 02/04/21 Left Hand 1 day          Drain            Urethral Catheter Other (Comment) 16 Fr  12 days                            Scheduled Meds:  Current Facility-Administered Medications   Medication Dose Route Frequency Provider Last Rate    acetaminophen  650 mg Oral Q6H PRN Lovena Jaz, PA-C      amiodarone  0 5 mg/min Intravenous Continuous DYANA Farah-C 0 5 mg/min (02/04/21 1156)    amiodarone  200 mg Oral TID With Meals Anthony Quintero PA-C      atorvastatin  20 mg Oral Daily With [employer], PA-C      furosemide  40 mg Oral Daily Anthony Quintero [place]      hydrALAZINE  5 mg Intravenous Q6H PRN Lovena Jaz, PA-KATHIE      levETIRAcetam  500 mg Oral Q12H [facility] Lovena Jaz, PA-C      lidocaine (PF)  5 mL Infiltration Once Lovena Jaz, PA-C      magnesium sulfate  2 g Intravenous Once Haydee Freeze, DO      metoprolol succinate  25 mg Oral Daily Anthony Quintero PA-C      ondansetron  4 mg Intravenous Q6H PRN Lovena Jaz, PA-C      oxyCODONE  2 5 mg Oral Q4H PRN Lovena Jaz, PA-C      pantoprazole  40 mg Oral Early Morning KATHY Sepulveda      prasugrel  10 mg Oral Daily Lovena Jaz, PA-KATHIE      psyllium  1 packet Oral Daily Lovena Jaz, PA-C      rivaroxaban  15 mg Oral Daily With [employer], PA-C      saccharomyces boulardii  250 mg Oral BID Blanca Carrillo PA-C       Continuous Infusions:amiodarone, 0 5 mg/min, Last Rate: 0 5 mg/min (02/04/21 1156)      PRN Meds:   acetaminophen    hydrALAZINE    ondansetron    oxyCODONE    Physical Exam:   GEN: NAD, alert and oriented, well appearing  SKIN: dry without significant lesions or rashes  HEENT: NCAT, PERRL, EOMs intact  NECK: No JVD or carotid bruits appreciated  CARDIOVASCULAR: RRR, normal S1, S2 without murmurs, rubs, or gallops appreciated  LUNGS: Clear to auscultation bilaterally without wheezes, rhonchi, or rales  ABDOMEN: Soft, nontender, nondistended  EXTREMITIES/VASCULAR: perfused without clubbing, cyanosis, or edema b/l  PSYCH: Normal mood and affect  NEURO: CN ll-Xll grossly intact                Lab Results: I have personally reviewed pertinent lab results  Results from last 7 days   Lab Units 02/04/21 0228 02/02/21 1652 02/02/21  0457   WBC Thousand/uL 8 38 7 88 7 25   HEMOGLOBIN g/dL 13 2 10 9* 10 4*   HEMATOCRIT % 42 4 34 0* 33 4*   PLATELETS Thousands/uL 245 229 236     Results from last 7 days   Lab Units 02/05/21  0547 02/04/21  0209 02/02/21  1652 01/29/21  1746   POTASSIUM mmol/L 3 5  3 5 5 2 3 8   < >  --    CHLORIDE mmol/L 102  101 98* 103   < >  --    CO2 mmol/L 30  30 27 26   < >  --    CO2, I-STAT mmol/L  --   --   --   --  33*   BUN mg/dL 7  7 8 9   < >  --    CREATININE mg/dL 0 62  0 58* 0 82 0 59*   < >  --    GLUCOSE, ISTAT mg/dl  --   --   --   --  106   CALCIUM mg/dL 8 5  8 4 9 0 8 6   < >  --     < > = values in this interval not displayed           Results from last 7 days   Lab Units 02/05/21 0547 02/04/21 0209 02/02/21  1652   MAGNESIUM mg/dL 1 6  1 6 2 0 3 6*

## 2021-02-05 NOTE — CASE MANAGEMENT
Spoke to pts daughter Ivana Sutherland  She questioned why pt is not approved to go back to [facility]  I explained to her that after reviewing patient's case with their physician, patient is not approved for inpatient acute rehab and they recommended subacute rehab for slower paced therapy needs  I let her know that her mother refused this and was agreeable to PT, OT, SN with [facility]  I informed her a referral was placed to [facility] and she was accepted as a pt

## 2021-02-05 NOTE — SPEECH THERAPY NOTE
[facility] Speech Therapy Discharge Summary    Pt was discharged to acute care setting due to change in medical status for further medical work up, monitoring and treatment  Pt was previously on a dysphagia level 3 diet and thin liquids, however, was made NPO by medical and GI teams during rehab stay for further work up  Most recently, prior to transfer, pt was returned to a dysphagia level 3 diet and thin liquids with recommendations for continued skilled ST services for dysphagia therapy with goal to safely achieve baseline diet of level 3/regular solids and liquids  Recommend continued skilled ST services in acute care setting as pt able, appropriate

## 2021-02-05 NOTE — SPEECH THERAPY NOTE
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[facility] )    Dysphagia    Hyponatremia    Urinary retention    Seizure-like activity (HCC)    Hyperlipidemia    Toxic metabolic encephalopathy    Aspiration pneumonia [facility])       Past Medical History  Past Medical History:   Diagnosis Date    Anal fissure     Cardiac disorder     Cognitive changes 12/23/2020    Esophageal reflux     Esophagitis, reflux     Hemorrhoids     Hepatic hemangioma     Last Assessed: 1/13/2015    Herpes zoster     History of colonic polyps     Hypertension     Ischemic colitis ([facility] )     Lumbar herniated disc     Malignant neoplasm without specification of site ([place] [address] )     Nephrolithiasis     L   Lithotripsy    Nontoxic single thyroid nodule     Last Assessed: 1/13/2015    Osteoarthritis     Overactive bladder     Raynaud disease     Respiratory system disease     Sjogren's disease ([facility] )     Spinal stenosis     PONCHO (stress urinary incontinence, female)     Uterovaginal prolapse     Grade I-II        Past Surgical History  Past Surgical History:   Procedure Laterality Date    APPENDECTOMY  1947    CARDIAC SURGERY      CABG    CATARACT EXTRACTION Bilateral     COLONOSCOPY  2012    Fiberoptic    COLONOSCOPY      Resolved: 2006 - 2012 5 year f/u    CORONARY ANGIOPLASTY WITH STENT PLACEMENT      CORONARY ARTERY BYPASS GRAFT      Resolved: 2012    ESOPHAGOGASTRODUODENOSCOPY  2012    Diagnostic    HEMORROIDECTOMY      KNEE SURGERY      LITHOTRIPSY      Renal    MALIGNANT SKIN LESION EXCISION      Face; Resolved: 2004    ND ESOPHAGOGASTRODUODENOSCOPY TRANSORAL DIAGNOSTIC N/A 4/13/2016    Procedure: EGD AND COLONOSCOPY;  Surgeon: Addis Gastelum MD;  [facility]; Service: Gastroenterology    RENAL ARTERY STENT      SKIN LESION EXCISION      Scalp    SOFT TISSUE TUMOR RESECTION      Shoulder; Resolved: 1995    THROMBOLYSIS      Postoperative Thrombolysis PTCA    TONSILLECTOMY      Resolved: 1944         Subjective:  "This food is pretty good" Patient awake and alert  OOB in chair  Objective: The patient is seen for f/u dysphagia therapy at lunch meal  She was assessed with regular solids including pasta, vegetables and thin liquids  She feeds herself with adequate rate and bite size  Mastication is timely and efficient with no oral residue  The patient takes small, single sips of thin liquids via straw with no overt s/s aspiration  The patient does not have any reports of trouble swallowing or difficulty taking medications  Assessment:  Patient is tolerating a regular diet well  Plan/Recommendations:  Continue regular diet with thin liquids  No further ST warranted  Please re-consult with concerns

## 2021-02-05 NOTE — ASSESSMENT & PLAN NOTE
Wt Readings from Last 3 Encounters:   02/05/21 66 2 kg (145 lb 15 1 oz)   01/27/21 70 kg (154 lb 4 8 oz)   01/21/21 63 7 kg (140 lb 6 4 oz)   Acute on chronic systolic/diastolic HF, ef of 01%; possibly precipitated by afib with rvr  Cardiology on board, started on IV bumex  Cont I/O, daily wts

## 2021-02-05 NOTE — NURSING NOTE
At 1210 patient went into Rapid Atrial Fibrillation with 'S-170's  Patient asymptomatic, AAO x4 resting comfortably in the chair  SLIM MD on the unit, notified and came bedside  Juan San EP also notified  PO amiodarone given, patient was ordered PO metoprolol, IV Magnesium, and IV Digoxin  Labs drawn and sent /EKG performed  Multi-function defibrillation electrodes placed on patient and patient moved back into bed  Patient rhythm broke multiple times from Rapid Afib to V Paced in the 80s-90s  Patient currently still in Afib at 1320  EP ordered RN to continue to monitor patient and will order an Amio bolus if patient remains in Rapid Afib for 2 hrs  No other new orders at this time  RN will continue to Monitor

## 2021-02-05 NOTE — PROGRESS NOTES
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)    CAD (coronary artery disease)    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HCC)    Dysphagia    Hyponatremia    Urinary retention    Seizure-like activity (HCC)    Hyperlipidemia    Toxic metabolic encephalopathy    Aspiration pneumonia ([facility] [address] )      Plan:    1  Acute on chronic diastolic CHF - EF low normal at 50%  Given long extensive hospitalization, and runs of rapid atrial fibrillation, Siderniee Laura and is showing increasing signs of volume overload with rales and lower extremity edema  Did receive an IV dose of Lasix  We will discontinue her oral Lasix and initiate Bumex 2 mg twice daily  We will follow urine output, daily labs and weight  2   Atrial fibrillation with rapid ventricular response - She continues to go in and out of atrial fibrillation, currently in rapid atrial fibrillation  This does exacerbate her CHF  Management per electrophysiology  Currently getting an amiodarone load  Continue metoprolol and is getting digoxin as well  3   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- Noted to have a significant conversion pause  Now status post permanent pacemaker  4  CAD - She had a prior CABG back in 2011, in which was known that her LIMA close not long after surgery  She did have PCI to the LAD and diagonal branch, and then cardiac catheterization earlier this admission showed residual stenosis in her LAD and she is status post PCI to the LAD and distal left main  Continue dual anti-platelet therapy  EF low normal   On goal-directed medical therapy otherwise  Subjective:     S by a electrophysiology to evaluate patient and help manage volume status and CHF  Patient currently in rapid atrial fibrillation, managed by electrophysiology    Is showing signs of volume overload  Yesterday received an IV dose of Lasix  Is on Lasix 40 mg daily otherwise  Objective:     Vitals: Blood pressure 130/64, pulse (!) 126, temperature 98 5 °F (36 9 °C), resp  rate 16, weight 66 2 kg (145 lb 15 1 oz), SpO2 95 %, not currently breastfeeding , Body mass index is 30 5 kg/m² ,   Orthostatic Blood Pressures      Most Recent Value   Blood Pressure  130/64 filed at 2021 1307   Patient Position - Orthostatic VS  Lying filed at 2021 2308            Intake/Output Summary (Last 24 hours) at 2021 1349  Last data filed at 2021 0945  Gross per 24 hour   Intake 120 ml   Output 1450 ml   Net -1330 ml       Telemetry review:  Ventricular paced rhythm with runs of rapid atrial fibrillation  Currently in rapid atrial fibrillation wide QRS  Physical Exam:    GEN: Albaro Rios appears well, alert and oriented x 3, pleasant and cooperative   HEENT: pupils equal, round, and reactive to light; extraocular muscles intact  NECK: supple, no carotid bruits  +JVD   HEART: irregular rhythm, normal S1 and S2, no murmurs, clicks, gallops or rubs   LUNGS:  Diminished with basilar rales bilaterally; no wheezes, rales, or rhonchi   ABDOMEN: normal bowel sounds, soft, no tenderness, no distention  EXTREMITIES: peripheral pulses normal; no clubbing, cyanosis   ++ edema bilaterally up to the thighs    NEURO: no focal findings   SKIN: normal without suspicious lesions on exposed skin    Medications:      Current Facility-Administered Medications:     acetaminophen (TYLENOL) tablet 650 mg, 650 mg, Oral, Q6H PRN, Patti Gray PA-C, 650 mg at 21    [] amiodarone (CORDARONE) 900 mg in dextrose 5 % 500 mL infusion, 1 mg/min, Intravenous, Continuous, Stopped at 21 0328 **FOLLOWED BY** amiodarone (CORDARONE) 900 mg in dextrose 5 % 500 mL infusion, 0 5 mg/min, Intravenous, Continuous, Leonila oFster PA-C, Last Rate: 33 3 mL/hr at 21 1339, 1 mg/min at 02/05/21 1339    amiodarone tablet 200 mg, 200 mg, Oral, TID With Meals, Yanely Quintero PA-C, 200 mg at 02/05/21 1210    atorvastatin (LIPITOR) tablet 20 mg, 20 mg, Oral, Daily With Giovana Galindo PA-C, 20 mg at 02/04/21 1804    furosemide (LASIX) tablet 40 mg, 40 mg, Oral, Daily, Anthony Quintero PA-C, 40 mg at 02/05/21 2184    hydrALAZINE (APRESOLINE) injection 5 mg, 5 mg, Intravenous, Q6H PRN, Stacy Cash PA-C, Stopped at 02/04/21 0235    levETIRAcetam (KEPPRA) tablet 500 mg, 500 mg, Oral, Q12H [address], Stacy Cash PA-C, 500 mg at 02/05/21 0943    lidocaine (PF) (XYLOCAINE-MPF) 1 % injection 5 mL, 5 mL, Infiltration, Once, Stacy Cash PA-C    magnesium sulfate 2 g/50 mL IVPB (premix) 2 g, 2 g, Intravenous, Once, Guerline Garcia DO, 2 g at 02/05/21 1216    metoprolol succinate (TOPROL-XL) 24 hr tablet 25 mg, 25 mg, Oral, Daily, Anthony Quintero PA-C, 25 mg at 02/05/21 1213    ondansetron (ZOFRAN) injection 4 mg, 4 mg, Intravenous, Q6H PRN, Stacy Cash PA-C, 4 mg at 02/03/21 1741    oxyCODONE (ROXICODONE) IR tablet 2 5 mg, 2 5 mg, Oral, Q4H PRN, Stacy Cash PA-C, 2 5 mg at 02/02/21 0138    pantoprazole (PROTONIX) EC tablet 40 mg, 40 mg, Oral, Early Morning, KATHY Cortes, 40 mg at 02/05/21 0545    prasugrel (EFFIENT) tablet 10 mg, 10 mg, Oral, Daily, Stacy Cash PA-C, 10 mg at 02/05/21 2559    psyllium (METAMUCIL) 1 packet, 1 packet, Oral, Daily, Stacy Cash PA-C, 1 packet at 02/05/21 1943    rivaroxaban (XARELTO) tablet 15 mg, 15 mg, Oral, Daily With Giovana Galindo PA-C, 15 mg at 02/04/21 1804    saccharomyces boulardii (FLORASTOR) capsule 250 mg, 250 mg, Oral, BID, Stacy Cash PA-C, 250 mg at 02/05/21 3203     Labs & Results:    Results from last 7 days   Lab Units 02/03/21  0613 02/02/21 2011 02/02/21  1652   TROPONIN I ng/mL 0 06* 0 07* 0 06*     Results from last 7 days   Lab Units 02/04/21  7026 02/02/21  1652 02/02/21  0457   WBC Thousand/uL 8 38 7 88 7 25   HEMOGLOBIN g/dL 13 2 10 9* 10 4*   HEMATOCRIT % 42 4 34 0* 33 4*   PLATELETS Thousands/uL 245 229 236         Results from last 7 days   Lab Units 02/05/21  1224 02/05/21  0547 02/04/21  0209  01/31/21  0027 01/30/21  0350  01/29/21  1746   POTASSIUM mmol/L 3 1* 3 5  3 5 5 2   < > 4 2 3 6   < >  --    CHLORIDE mmol/L 96* 102  101 98*   < > 101 100   < >  --    CO2 mmol/L 32 30  30 27   < > 29 30   < >  --    CO2, I-STAT mmol/L  --   --   --   --   --   --   --  33*   BUN mg/dL 6 7  7 8   < > 5 8   < >  --    CREATININE mg/dL 0 71 0 62  0 58* 0 82   < > 0 81 0 70   < >  --    CALCIUM mg/dL 8 2* 8 5  8 4 9 0   < > 8 7 7 9*   < >  --    ALK PHOS U/L  --   --   --   --  79 61  --   --    ALT U/L  --   --   --   --  27 21  --   --    AST U/L  --   --   --   --  28 20  --   --    GLUCOSE, ISTAT mg/dl  --   --   --   --   --   --   --  106    < > = values in this interval not displayed  Results from last 7 days   Lab Units 02/05/21  1224 02/05/21  0547 02/04/21  0209   MAGNESIUM mg/dL 1 6 1 6  1 6 2 0         EKG personally reviewed by Ernesto Paulson MD   Atrial fibrillation on admission  ECHO:  LEFT VENTRICLE:  Size was normal   Systolic function was at the lower limits of normal  Ejection fraction was estimated to be 50 %  There were no regional wall motion abnormalities  There was severe hypokinesis of the distal septum  Wall thickness was mildly increased  Features were consistent with a pseudonormal left ventricular filling pattern, with concomitant abnormal relaxation and increased filling pressure (grade 2 diastolic dysfunction)      RIGHT VENTRICLE:  The size was normal   Systolic function was normal      LEFT ATRIUM:  The atrium was mildly dilated      MITRAL VALVE:  There was mild to moderate regurgitation      TRICUSPID VALVE:  There was mild regurgitation  Pulmonary artery systolic pressure was markedly increased    The findings suggest severe pulmonary hypertension      IVC, HEPATIC VEINS:  The inferior vena cava was mildly dilated  Respirophasic changes were blunted (less than 50% variation)      COMPARISONS:  There has been no significant interval change  Comparison was made with the previous study of 03-Dec-2020  CARDIAC CATH (1/11/21):  CORONARY CIRCULATION:  Ostial left main: There was a 75 % stenosis  The lesion was complex, eccentric, and mildly calcified  This is a likely culprit for the patient's clinical presentation  An intervention was performed  IVUS 4 9mm2 Area stenosis 70%  Proximal LAD: There was a diffuse 95 % stenosis  Severe diffuse disease on IVUS  1st obtuse marginal: There was a 100 % stenosis  Mid RCA: There was a 100 % stenosis  This lesion is a chronic total occlusion  Graft to the 1st diagonal: There was a 100 % stenosis at the proximal anastomosis  It does not appear amenable to intervention  Graft to the 1st obtuse marginal: There was a 100 % stenosis at the proximal anastomosis  It does not appear amenable to intervention  Graft to the distal RCA: There was a 50 % stenosis in the distal third of the graft  It appears amenable to percutaneous intervention      1ST LESION INTERVENTIONS:  A successful balloon angioplasty with stent procedure was performed on the 95 % lesion in the proximal LAD  Following intervention there was an excellent angiographic appearance with a 0 % residual stenosis  A Resolute [place] Rx 2 5 x 22mm drug-eluting stent was placed across the lesion and deployed at a maximum inflation pressure of 12 irwin      2ND LESION INTERVENTIONS:  A successful drug-eluting stent procedure was performed on the 75 % lesion in the ostial left main  Following intervention there was an excellent angiographic appearance with a 0 % residual stenosis  A Resolute [place] Rx 3 5 x 08mm drug-eluting stent was placed across the lesion and deployed at a maximum inflation pressure of 12 irwin      Counseling / Coordination of Care  Total floor / unit time spent today 25 minutes  Greater than 50% of total time was spent with the patient and / or family counseling and / or coordination of care

## 2021-02-05 NOTE — ASSESSMENT & PLAN NOTE
· W/ recurring episodes of RVR, parosxymal  · EP on board  · Remains on amiodarone gtt, rate increased and PO TID   Given IV digoxin load and started on metoprolol   · A/c with xarelto  · Ensure electrolytes repletion, given IV mag  · Cont telemetry monitoring

## 2021-02-05 NOTE — OCCUPATIONAL THERAPY NOTE
Occupational Therapy Cancellation        Patient Name: Albaro Rios  VWKUC'[id number] Date: 2/5/2021 02/05/21 1229   OT Last Visit   OT Visit Date 02/05/21   Note Type   Note Type Treatment   Cancel Reasons Medical status     OT orders received  Chart reviewed  Currently pt w/ rapid afib, not medically appropriate for therapy at this time  OT will continue to follow to see as medically appropriate      Chantell Matthews MS, OTR/L

## 2021-02-05 NOTE — ASSESSMENT & PLAN NOTE
· Rapid response initiated while pt was at [facility] d/t seizure like activity  · S/p Neurology consult  · S/p EEG  · Not sure if able to have MRI as positive PPM; s/p CT head  · On keppra BID  · Sz precautions  · PENNDOT filed per neurology  · No recurring sz like activity

## 2021-02-05 NOTE — PROGRESS NOTES
Progress Note - Albaro Rios 1940, 79 y o  female MRN: [id number]    Unit/Bed#: [facility] 531-01 Encounter: [id number]    Primary Care Provider: Ivania Murray MD   Date and time admitted to hospital: 1/29/2021  3:37 PM        * A-fib [facility])  Assessment & Plan  · W/ recurring episodes of RVR, parosxymal  · EP on board  · Remains on amiodarone gtt, rate increased and PO TID   Given IV digoxin load and started on metoprolol   · A/c with xarelto  · Ensure electrolytes repletion, given IV mag  · Cont telemetry monitoring      Seizure-like activity (HCC)  Assessment & Plan  · Rapid response initiated while pt was at [facility] d/t seizure like activity  · S/p Neurology consult  · S/p EEG  · Not sure if able to have MRI as positive PPM; s/p CT head  · On keppra BID  · Sz precautions  · PENNDOT filed per neurology  · No recurring sz like activity      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)  Assessment & Plan  Wt Readings from Last 3 Encounters:   02/05/21 66 2 kg (145 lb 15 1 oz)   01/27/21 70 kg (154 lb 4 8 oz)   01/21/21 63 7 kg (140 lb 6 4 oz)   Acute on chronic systolic/diastolic HF, ef of 39%; possibly precipitated by afib with rvr  Cardiology on board, started on IV bumex  Cont I/O, daily wts          CAD (coronary artery disease)  Assessment & Plan  S/p cardiac cath/PCI/LAMINE x 2 1/11/2021  On effient, per cards on past admission d/c of asa given on xarelto as well  Pt is CP free    Urinary retention  Assessment & Plan  Continue with Saeed catheter for now  Voiding trial prior to discharge    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[facility])  Assessment & Plan  · + Pacemaker in place, 1/19/2021    Dysphagia  Assessment & Plan  · Resolved; s/p speech/GI eval 2/2; cleared for upgrade of diet to regular     Hyponatremia  Assessment & Plan  Secondary to volume overload,   Cont diuresis  No change in MS    VTE Pharmacologic Prophylaxis:   Pharmacologic: Rivaroxaban (Xarelto)  Mechanical VTE Prophylaxis in Place: No    Patient Centered Rounds: I have performed bedside rounds with nursing staff today  Discussions with Specialists or Other Care Team Provider: Cardiology    Education and Discussions with Family / Patient: Patient; also called dtr Lola Dejesus and updated at length    Time Spent for Care: 45 minutes  More than 50% of total time spent on counseling and coordination of care as described above  Current Length of Stay: 7 day(s)    Current Patient Status: Inpatient   Certification Statement: The patient will continue to require additional inpatient hospital stay due to Afib w  rvr, hf exacerbation    Discharge Plan: SNF vs home; pt not accepted back to [facility]    Code Status: Level 3 - DNAR and DNI      Subjective:   Recurring parosxymal episodes of afib with rvr, cardiology made aware and medication adjustments made  Thankfully she remains asymptomatic    Objective:     Vitals:   Temp (24hrs), Av 9 °F (37 2 °C), Min:97 8 °F (36 6 °C), Max:100 3 °F (37 9 °C)    Temp:  [97 8 °F (36 6 °C)-100 3 °F (37 9 °C)] 98 6 °F (37 °C)  HR:  [] 126  Resp:  [16-19] 16  BP: (100-152)/(61-78) 130/64  SpO2:  [95 %-98 %] 95 %  Body mass index is 30 5 kg/m²  Input and Output Summary (last 24 hours): Intake/Output Summary (Last 24 hours) at 2021 1647  Last data filed at 2021 1503  Gross per 24 hour   Intake 238 ml   Output 2700 ml   Net -2462 ml       Physical Exam:     Physical Exam  Neck:      Musculoskeletal: Normal range of motion and neck supple  Cardiovascular:      Rate and Rhythm: Tachycardia present  Rhythm irregular  Pulses: Normal pulses  Pulmonary:      Effort: Pulmonary effort is normal       Comments: Mild bibasilar crackles  Abdominal:      General: Abdomen is flat  Bowel sounds are normal  There is no distension  Palpations: Abdomen is soft  Tenderness: There is no abdominal tenderness  There is no guarding  Musculoskeletal:      Right lower leg: Edema present        Left lower leg: Edema present  Skin:     General: Skin is warm and dry  Neurological:      General: No focal deficit present  Mental Status: She is alert and oriented to person, place, and time  Additional Data:     Labs:    Results from last 7 days   Lab Units 02/04/21  0228 02/02/21  1652  01/31/21  0027   WBC Thousand/uL 8 38 7 88   < > 10 77*   HEMOGLOBIN g/dL 13 2 10 9*   < > 11 0*   HEMATOCRIT % 42 4 34 0*   < > 34 7*   PLATELETS Thousands/uL 245 229   < > 325   BANDS PCT %  --   --   --  4   NEUTROS PCT %  --  43   < >  --    LYMPHS PCT %  --  45*   < >  --    LYMPHO PCT %  --   --   --  23   MONOS PCT %  --  8   < >  --    MONO PCT %  --   --   --  8   EOS PCT %  --  2   < > 0    < > = values in this interval not displayed  Results from last 7 days   Lab Units 02/05/21  1224  01/31/21  0027   SODIUM mmol/L 132*   < > 135*   POTASSIUM mmol/L 3 1*   < > 4 2   CHLORIDE mmol/L 96*   < > 101   CO2 mmol/L 32   < > 29   BUN mg/dL 6   < > 5   CREATININE mg/dL 0 71   < > 0 81   ANION GAP mmol/L 4   < > 5   CALCIUM mg/dL 8 2*   < > 8 7   ALBUMIN g/dL  --   --  2 9*   TOTAL BILIRUBIN mg/dL  --   --  1 06*   ALK PHOS U/L  --   --  79   ALT U/L  --   --  27   AST U/L  --   --  28   GLUCOSE RANDOM mg/dL 166*   < > 139    < > = values in this interval not displayed  Results from last 7 days   Lab Units 02/05/21  1602 02/05/21  1104 02/05/21  0739 02/04/21  1525 02/04/21  1031 02/04/21  0626 02/03/21  2119 02/03/21  1510 02/03/21  1122 02/03/21  0648 02/02/21  2058 02/02/21  1603   POC GLUCOSE mg/dl 141* 120 121 119 117 108 96 129 163* 118 111 119         Results from last 7 days   Lab Units 02/01/21  0612 02/01/21  0419 01/31/21  0616 01/31/21  0604 01/31/21  0027 01/30/21  1220   LACTIC ACID mmol/L  --   --   --  1 1 2 5*  --    PROCALCITONIN ng/ml 0 12 0 07 <0 05  --   --  0 07           * I Have Reviewed All Lab Data Listed Above  * Additional Pertinent Lab Tests Reviewed:  All Labs Within Last 24 Hours Reviewed    Imaging:    Imaging Reports Reviewed Today Include:   Imaging Personally Reviewed by Myself Includes:      Recent Cultures (last 7 days):     Results from last 7 days   Lab Units 01/30/21  1311 01/30/21  1155   BLOOD CULTURE  No Growth After 5 Days  No Growth After 5 Days  Last 24 Hours Medication List:   Current Facility-Administered Medications   Medication Dose Route Frequency Provider Last Rate    acetaminophen  650 mg Oral Q6H PRN Jayy Lawson PA-C      amiodarone  0 5 mg/min Intravenous Continuous Derril CandyCARLY 1 mg/min (02/05/21 1530)    amiodarone  200 mg Oral TID With Meals Anthony Quintero PA-C      atorvastatin  20 mg Oral Daily With [employer]      bumetanide  2 mg Intravenous BID (diuretic) Davian Newman MD      hydrALAZINE  5 mg Intravenous Q6H PRN Jayy Lawson PA-C      levETIRAcetam  500 mg Oral Q12H [address] Jayy Lawson PA-C      lidocaine (PF)  5 mL Infiltration Once Jayy Lawson PA-C      metoprolol succinate  25 mg Oral Daily Anthony Quintero, [place]      ondansetron  4 mg Intravenous Q6H PRN Jayy Lawson PA-C      oxyCODONE  2 5 mg Oral Q4H PRN Jayy Lawson PA-C      pantoprazole  40 mg Oral Early Morning Unknown [place], CRNP      prasugrel  10 mg Oral Daily Jayy Lawson PA-C      psyllium  1 packet Oral Daily Jayy Lawson PA-C      rivaroxaban  15 mg Oral Daily With [employer], CARLY      saccharomyces boulardii  250 mg Oral BID Jayy Lawson PA-C          Today, Patient Was Seen By: Jesus Richardson DO    ** Please Note: Dictation voice to text software may have been used in the creation of this document   **

## 2021-02-06 LAB
ANION GAP SERPL CALCULATED.3IONS-SCNC: 3 MMOL/L (ref 4–13)
BACTERIA UR QL AUTO: ABNORMAL /HPF
BILIRUB UR QL STRIP: NEGATIVE
BUN SERPL-MCNC: 5 MG/DL (ref 5–25)
CALCIUM SERPL-MCNC: 8.1 MG/DL (ref 8.3–10.1)
CHLORIDE SERPL-SCNC: 98 MMOL/L (ref 100–108)
CLARITY UR: ABNORMAL
CO2 SERPL-SCNC: 30 MMOL/L (ref 21–32)
COLOR UR: YELLOW
CREAT SERPL-MCNC: 0.6 MG/DL (ref 0.6–1.3)
ERYTHROCYTE [DISTWIDTH] IN BLOOD BY AUTOMATED COUNT: 15.2 % (ref 11.6–15.1)
GFR SERPL CREATININE-BSD FRML MDRD: 86 ML/MIN/1.73SQ M
GLUCOSE SERPL-MCNC: 114 MG/DL (ref 65–140)
GLUCOSE SERPL-MCNC: 119 MG/DL (ref 65–140)
GLUCOSE SERPL-MCNC: 202 MG/DL (ref 65–140)
GLUCOSE UR STRIP-MCNC: NEGATIVE MG/DL
HCT VFR BLD AUTO: 29.5 % (ref 34.8–46.1)
HGB BLD-MCNC: 9.4 G/DL (ref 11.5–15.4)
HGB UR QL STRIP.AUTO: ABNORMAL
KETONES UR STRIP-MCNC: NEGATIVE MG/DL
LEUKOCYTE ESTERASE UR QL STRIP: ABNORMAL
MAGNESIUM SERPL-MCNC: 2 MG/DL (ref 1.6–2.6)
MCH RBC QN AUTO: 30.4 PG (ref 26.8–34.3)
MCHC RBC AUTO-ENTMCNC: 31.9 G/DL (ref 31.4–37.4)
MCV RBC AUTO: 96 FL (ref 82–98)
NITRITE UR QL STRIP: NEGATIVE
NON-SQ EPI CELLS URNS QL MICRO: ABNORMAL /HPF
OTHER STN SPEC: ABNORMAL
PH UR STRIP.AUTO: 8 [PH]
PHOSPHATE SERPL-MCNC: 2.8 MG/DL (ref 2.3–4.1)
PLATELET # BLD AUTO: 185 THOUSANDS/UL (ref 149–390)
PMV BLD AUTO: 10.1 FL (ref 8.9–12.7)
POTASSIUM SERPL-SCNC: 3.8 MMOL/L (ref 3.5–5.3)
PROT UR STRIP-MCNC: ABNORMAL MG/DL
RBC # BLD AUTO: 3.09 MILLION/UL (ref 3.81–5.12)
RBC #/AREA URNS AUTO: ABNORMAL /HPF
SODIUM SERPL-SCNC: 131 MMOL/L (ref 136–145)
SP GR UR STRIP.AUTO: 1.01 (ref 1–1.03)
UROBILINOGEN UR QL STRIP.AUTO: 0.2 E.U./DL
WBC # BLD AUTO: 7.38 THOUSAND/UL (ref 4.31–10.16)
WBC #/AREA URNS AUTO: ABNORMAL /HPF

## 2021-02-06 PROCEDURE — 80048 BASIC METABOLIC PNL TOTAL CA: CPT | Performed by: PHYSICIAN ASSISTANT

## 2021-02-06 PROCEDURE — 99232 SBSQ HOSP IP/OBS MODERATE 35: CPT | Performed by: INTERNAL MEDICINE

## 2021-02-06 PROCEDURE — 84100 ASSAY OF PHOSPHORUS: CPT | Performed by: INTERNAL MEDICINE

## 2021-02-06 PROCEDURE — 85027 COMPLETE CBC AUTOMATED: CPT | Performed by: INTERNAL MEDICINE

## 2021-02-06 PROCEDURE — 82948 REAGENT STRIP/BLOOD GLUCOSE: CPT

## 2021-02-06 PROCEDURE — 83735 ASSAY OF MAGNESIUM: CPT | Performed by: INTERNAL MEDICINE

## 2021-02-06 PROCEDURE — 81001 URINALYSIS AUTO W/SCOPE: CPT | Performed by: PHYSICIAN ASSISTANT

## 2021-02-06 RX ADMIN — LEVETIRACETAM 500 MG: 500 TABLET, FILM COATED ORAL at 21:52

## 2021-02-06 RX ADMIN — AMIODARONE HYDROCHLORIDE 1 MG/MIN: 50 INJECTION, SOLUTION INTRAVENOUS at 06:34

## 2021-02-06 RX ADMIN — AMIODARONE HYDROCHLORIDE 200 MG: 200 TABLET ORAL at 12:01

## 2021-02-06 RX ADMIN — METOPROLOL SUCCINATE 25 MG: 25 TABLET, EXTENDED RELEASE ORAL at 08:53

## 2021-02-06 RX ADMIN — Medication 250 MG: at 08:53

## 2021-02-06 RX ADMIN — PRASUGREL HYDROCHLORIDE 10 MG: 10 TABLET, FILM COATED ORAL at 08:53

## 2021-02-06 RX ADMIN — LEVETIRACETAM 500 MG: 500 TABLET, FILM COATED ORAL at 08:53

## 2021-02-06 RX ADMIN — BUMETANIDE 2 MG: 0.25 INJECTION INTRAMUSCULAR; INTRAVENOUS at 12:01

## 2021-02-06 RX ADMIN — MELATONIN 3 MG: at 21:53

## 2021-02-06 RX ADMIN — RIVAROXABAN 15 MG: 15 TABLET, FILM COATED ORAL at 16:36

## 2021-02-06 RX ADMIN — AMIODARONE HYDROCHLORIDE 200 MG: 200 TABLET ORAL at 07:27

## 2021-02-06 RX ADMIN — Medication 250 MG: at 17:39

## 2021-02-06 RX ADMIN — AMIODARONE HYDROCHLORIDE 200 MG: 200 TABLET ORAL at 16:37

## 2021-02-06 RX ADMIN — CEFTRIAXONE SODIUM 1000 MG: 10 INJECTION, POWDER, FOR SOLUTION INTRAVENOUS at 21:21

## 2021-02-06 RX ADMIN — ATORVASTATIN CALCIUM 20 MG: 20 TABLET, FILM COATED ORAL at 16:36

## 2021-02-06 RX ADMIN — PANTOPRAZOLE SODIUM 40 MG: 40 TABLET, DELAYED RELEASE ORAL at 05:06

## 2021-02-06 RX ADMIN — BUMETANIDE 2 MG: 0.25 INJECTION INTRAMUSCULAR; INTRAVENOUS at 16:36

## 2021-02-06 NOTE — PLAN OF CARE
Problem: Prexisting or High Potential for Compromised Skin Integrity  Goal: Skin integrity is maintained or improved  Description: INTERVENTIONS:  - Identify patients at risk for skin breakdown  - Assess and monitor skin integrity  - Assess and monitor nutrition and hydration status  - Monitor labs   - Assess for incontinence   - Turn and reposition patient  - Assist with mobility/ambulation  - Relieve pressure over bony prominences  - Avoid friction and shearing  - Provide appropriate hygiene as needed including keeping skin clean and dry  - Evaluate need for skin moisturizer/barrier cream  - Collaborate with interdisciplinary team   - Patient/family teaching  - Consider wound care consult   Outcome: Progressing     Problem: Neurological Deficit  Goal: Neurological status is stable or improving  Description: Interventions:  - Monitor and assess patient's level of consciousness, motor function, sensory function, and level of assistance needed for ADLs  - Monitor and report changes from baseline  Collaborate with interdisciplinary team to initiate plan and implement interventions as ordered  - Provide and maintain a safe environment  - Consider seizure precautions  - Consider fall precautions  - Consider aspiration precautions  - Consider bleeding precautions  Outcome: Progressing     Problem: Activity Intolerance/Impaired Mobility  Goal: Mobility/activity is maintained at optimum level for patient  Description: Interventions:  - Assess and monitor patient  barriers to mobility and need for assistive/adaptive devices  - Assess patient's emotional response to limitations  - Collaborate with interdisciplinary team and initiate plans and interventions as ordered  - Encourage independent activity per ability   - Maintain proper body alignment  - Perform active/passive rom as tolerated/ordered    - Plan activities to conserve energy   - Turn patient as appropriate  Outcome: Progressing     Problem: Communication Impairment  Goal: Ability to express needs and understand communication  Description: Assess patient's communication skills and ability to understand information  Patient will demonstrate use of effective communication techniques, alternative methods of communication and understanding even if not able to speak  - Encourage communication and provide alternate methods of communication as needed  - Collaborate with case management/ for discharge needs  - Include patient/family/caregiver in decisions related to communication  Outcome: Progressing     Problem: Potential for Aspiration  Goal: Non-ventilated patient's risk of aspiration is minimized  Description: Assess and monitor vital signs, respiratory status, and labs (WBC)  Monitor for signs of aspiration (tachypnea, cough, rales, wheezing, cyanosis, fever)  - Assess and monitor patient's ability to swallow  - Place patient up in chair to eat if possible  - HOB up at 90 degrees to eat if unable to get patient up into chair   - Supervise patient during oral intake  - Instruct patient/ family to take small bites  - Instruct patient/ family to take small single sips when taking liquids  - Follow patient-specific strategies generated by speech pathologist   Outcome: Progressing     Problem: Nutrition  Goal: Nutrition/Hydration status is improving  Description: Monitor and assess patient's nutrition/hydration status for malnutrition (ex- brittle hair, bruises, dry skin, pale skin and conjunctiva, muscle wasting, smooth red tongue, and disorientation)  Collaborate with interdisciplinary team and initiate plan and interventions as ordered  Monitor patient's weight and dietary intake as ordered or per policy  Utilize nutrition screening tool and intervene per policy  Determine patient's food preferences and provide high-protein, high-caloric foods as appropriate       - Assist patient with eating   - Allow adequate time for meals   - Encourage patient to take dietary supplement as ordered  - Collaborate with clinical nutritionist   - Include patient/family/caregiver in decisions related to nutrition  Outcome: Progressing     Problem: PAIN - ADULT  Goal: Verbalizes/displays adequate comfort level or baseline comfort level  Description: Interventions:  - Encourage patient to monitor pain and request assistance  - Assess pain using appropriate pain scale  - Administer analgesics based on type and severity of pain and evaluate response  - Implement non-pharmacological measures as appropriate and evaluate response  - Consider cultural and social influences on pain and pain management  - Notify physician/advanced practitioner if interventions unsuccessful or patient reports new pain  Outcome: Progressing     Problem: INFECTION - ADULT  Goal: Absence or prevention of progression during hospitalization  Description: INTERVENTIONS:  - Assess and monitor for signs and symptoms of infection  - Monitor lab/diagnostic results  - Monitor all insertion sites, i e  indwelling lines, tubes, and drains  - Monitor endotracheal if appropriate and nasal secretions for changes in amount and color  - [place] appropriate cooling/warming therapies per order  - Administer medications as ordered  - Instruct and encourage patient and family to use good hand hygiene technique  - Identify and instruct in appropriate isolation precautions for identified infection/condition  Outcome: Progressing  Goal: Absence of fever/infection during neutropenic period  Description: INTERVENTIONS:  - Monitor WBC    Outcome: Progressing     Problem: SAFETY ADULT  Goal: Patient will remain free of falls  Description: INTERVENTIONS:  - Assess patient frequently for physical needs  -  Identify cognitive and physical deficits and behaviors that affect risk of falls    -  [place] fall precautions as indicated by assessment   - Educate patient/family on patient safety including physical limitations  - Instruct patient to call for assistance with activity based on assessment  - Modify environment to reduce risk of injury  - Consider OT/PT consult to assist with strengthening/mobility  Outcome: Progressing  Goal: Maintain or return to baseline ADL function  Description: INTERVENTIONS:  -  Assess patient's ability to carry out ADLs; assess patient's baseline for ADL function and identify physical deficits which impact ability to perform ADLs (bathing, care of mouth/teeth, toileting, grooming, dressing, etc )  - Assess/evaluate cause of self-care deficits   - Assess range of motion  - Assess patient's mobility; develop plan if impaired  - Assess patient's need for assistive devices and provide as appropriate  - Encourage maximum independence but intervene and supervise when necessary  - Involve family in performance of ADLs  - Assess for home care needs following discharge   - Consider OT consult to assist with ADL evaluation and planning for discharge  - Provide patient education as appropriate  Outcome: Progressing  Goal: Maintain or return mobility status to optimal level  Description: INTERVENTIONS:  - Assess patient's baseline mobility status (ambulation, transfers, stairs, etc )    - Identify cognitive and physical deficits and behaviors that affect mobility  - Identify mobility aids required to assist with transfers and/or ambulation (gait belt, sit-to-stand, lift, walker, cane, etc )  - [place] fall precautions as indicated by assessment  - Record patient progress and toleration of activity level on Mobility SBAR; progress patient to next Phase/Stage  - Instruct patient to call for assistance with activity based on assessment  - Consider rehabilitation consult to assist with strengthening/weightbearing, etc   Outcome: Progressing     Problem: DISCHARGE PLANNING  Goal: Discharge to home or other facility with appropriate resources  Description: INTERVENTIONS:  - Identify barriers to discharge w/patient and caregiver  - Arrange for needed discharge resources and transportation as appropriate  - Identify discharge learning needs (meds, wound care, etc )  - Arrange for interpretive services to assist at discharge as needed  - Refer to Case Management Department for coordinating discharge planning if the patient needs post-hospital services based on physician/advanced practitioner order or complex needs related to functional status, cognitive ability, or social support system  Outcome: Progressing     Problem: Knowledge Deficit  Goal: Patient/family/caregiver demonstrates understanding of disease process, treatment plan, medications, and discharge instructions  Description: Complete learning assessment and assess knowledge base  Interventions:  - Provide teaching at level of understanding  - Provide teaching via preferred learning methods  Outcome: Progressing     Problem: Potential for Falls  Goal: Patient will remain free of falls  Description: INTERVENTIONS:  - Assess patient frequently for physical needs  -  Identify cognitive and physical deficits and behaviors that affect risk of falls  -  [place] fall precautions as indicated by assessment   - Educate patient/family on patient safety including physical limitations  - Instruct patient to call for assistance with activity based on assessment  - Modify environment to reduce risk of injury  - Consider OT/PT consult to assist with strengthening/mobility  Outcome: Progressing     Problem: Nutrition/Hydration-ADULT  Goal: Nutrient/Hydration intake appropriate for improving, restoring or maintaining nutritional needs  Description: Monitor and assess patient's nutrition/hydration status for malnutrition  Collaborate with interdisciplinary team and initiate plan and interventions as ordered  Monitor patient's weight and dietary intake as ordered or per policy  Utilize nutrition screening tool and intervene as necessary   Determine patient's food preferences and provide high-protein, high-caloric foods as appropriate       INTERVENTIONS:  - Monitor oral intake, urinary output, labs, and treatment plans  - Assess nutrition and hydration status and recommend course of action  - Evaluate amount of meals eaten  - Assist patient with eating if necessary   - Allow adequate time for meals  - Recommend/ encourage appropriate diets, oral nutritional supplements, and vitamin/mineral supplements  - Order, calculate, and assess calorie counts as needed  - Recommend, monitor, and adjust tube feedings and TPN/PPN based on assessed needs  - Assess need for intravenous fluids  - Provide specific nutrition/hydration education as appropriate  - Include patient/family/caregiver in decisions related to nutrition  Outcome: Progressing     Problem: CARDIOVASCULAR - ADULT  Goal: Maintains optimal cardiac output and hemodynamic stability  Description: INTERVENTIONS:  - Monitor I/O, vital signs and rhythm  - Monitor for S/S and trends of decreased cardiac output  - Administer and titrate ordered vasoactive medications to optimize hemodynamic stability  - Assess quality of pulses, skin color and temperature  - Assess for signs of decreased coronary artery perfusion  - Instruct patient to report change in severity of symptoms  Outcome: Progressing  Goal: Absence of cardiac dysrhythmias or at baseline rhythm  Description: INTERVENTIONS:  - Continuous cardiac monitoring, vital signs, obtain 12 lead EKG if ordered  - Administer antiarrhythmic and heart rate control medications as ordered  - Monitor electrolytes and administer replacement therapy as ordered  Outcome: Progressing     Problem: RESPIRATORY - ADULT  Goal: Achieves optimal ventilation and oxygenation  Description: INTERVENTIONS:  - Assess for changes in respiratory status  - Assess for changes in mentation and behavior  - Position to facilitate oxygenation and minimize respiratory effort  - Oxygen administered by appropriate delivery if ordered  - Initiate smoking cessation education as indicated  - Encourage broncho-pulmonary hygiene including cough, deep breathe, Incentive Spirometry  - Assess the need for suctioning and aspirate as needed  - Assess and instruct to report SOB or any respiratory difficulty  - Respiratory Therapy support as indicated  Outcome: Progressing

## 2021-02-06 NOTE — ASSESSMENT & PLAN NOTE
· W/ recurring episodes of RVR, parosxymal  Today much improved remains in SR, rate controlled  · EP on board  · Remains on amiodarone gtt, rate titrated down to 0 5; cont po   Cont metoprolol    · A/c with xarelto  · Cont telemetry monitoring

## 2021-02-06 NOTE — PROGRESS NOTES
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)    CAD (coronary artery disease)    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HCC)    Dysphagia    Hyponatremia    Urinary retention    Seizure-like activity (HCC)    Hyperlipidemia    Toxic metabolic encephalopathy    Aspiration pneumonia ([facility] [address] )      Plan:    1  Acute on chronic diastolic CHF - EF low normal at 50%  Given long extensive hospitalization, and runs of rapid atrial fibrillation, and over the last few days started showing increasing signs of volume overload  Has now responded IV Bumex  We will continue the same dosing regimen today, likely transitioning to oral diuretic therapy tomorrow  Continue to follow urine output, daily labs and weight  2   Atrial fibrillation with rapid ventricular response - She continues to go in and out of atrial fibrillation, but overnight converted back to sinus/paced rhythm  This does exacerbate her CHF  Management per electrophysiology  Currently getting an amiodarone load  Continue metoprolol and received digoxin as well  3   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- Noted to have a significant conversion pause  Now status post permanent pacemaker  4  CAD - She had a prior CABG back in 2011, in which was known that her LIMA close not long after surgery  She did have PCI to the LAD and diagonal branch, and then cardiac catheterization earlier this admission showed residual stenosis in her LAD and she is status post PCI to the LAD and distal left main  Continue dual anti-platelet therapy  EF low normal   On goal-directed medical therapy otherwise  Subjective:     Having a good response to IV Bumex  Less volume overloaded  Back in sinus/paced rhythm  Overnight some nonsustained atrial runs    Patient clinically feeling better  Objective:     Vitals: Blood pressure 152/92, pulse 71, temperature 98 9 °F (37 2 °C), temperature source Oral, resp  rate 17, weight 65 kg (143 lb 4 8 oz), SpO2 93 %, not currently breastfeeding , Body mass index is 29 95 kg/m² ,   Orthostatic Blood Pressures      Most Recent Value   Blood Pressure  152/92 filed at 2021 1315   Patient Position - Orthostatic VS  Sitting filed at 2021 0736            Intake/Output Summary (Last 24 hours) at 2021 1030  Last data filed at 2021 0800  Gross per 24 hour   Intake 1315 78 ml   Output 2600 ml   Net -1284 22 ml       Telemetry review:     Physical Exam:    GEN: Arthur Villa appears well, alert and oriented x 3, pleasant and cooperative   HEENT: pupils equal, round, and reactive to light; extraocular muscles intact  NECK: supple, no carotid bruits  +JVD - improved  HEART: regular rhythm, normal S1 and S2, no murmurs, clicks, gallops or rubs   LUNGS: Diminished bilaterally; no wheezes, rales, or rhonchi   ABDOMEN: normal bowel sounds, soft, no tenderness, no distention  EXTREMITIES: peripheral pulses normal; no clubbing, cyanosis    ++ edema B/L - Improved  NEURO: no focal findings   SKIN: normal without suspicious lesions on exposed skin    Medications:      Current Facility-Administered Medications:     acetaminophen (TYLENOL) tablet 650 mg, 650 mg, Oral, Q6H PRN, Jacques Moore PA-C, 650 mg at 21 204    [] amiodarone (CORDARONE) 900 mg in dextrose 5 % 500 mL infusion, 1 mg/min, Intravenous, Continuous, Stopped at 21 0328 **FOLLOWED BY** amiodarone (CORDARONE) 900 mg in dextrose 5 % 500 mL infusion, 0 5 mg/min, Intravenous, Continuous, Leonila Foster PA-C, Last Rate: 16 7 mL/hr at 21 0654, 0 5 mg/min at 21 0189    amiodarone tablet 200 mg, 200 mg, Oral, TID With Meals, Kylee Quintero PA-C, 200 mg at 21 0410    atorvastatin (LIPITOR) tablet 20 mg, 20 mg, Oral, Daily With Orvis Serum, PA-C, 20 mg at 02/05/21 1733    bumetanide (BUMEX) injection 2 mg, 2 mg, Intravenous, BID (diuretic), Teresa Toledo MD, 2 mg at 02/05/21 1733    hydrALAZINE (APRESOLINE) injection 5 mg, 5 mg, Intravenous, Q6H PRN, DYANA Ramos-C, Stopped at 02/04/21 0235    levETIRAcetam (KEPPRA) tablet 500 mg, 500 mg, Oral, Q12H [facility], DYANA Ramos-C, 500 mg at 02/06/21 0853    lidocaine (PF) (XYLOCAINE-MPF) 1 % injection 5 mL, 5 mL, Infiltration, Once, Blanca Carrillo PA-C    melatonin tablet 3 mg, 3 mg, Oral, HS, DYANA Walters-C, 3 mg at 02/05/21 2346    metoprolol succinate (TOPROL-XL) 24 hr tablet 25 mg, 25 mg, Oral, Daily, Anthony Quintero PA-C, 25 mg at 02/06/21 0853    ondansetron (ZOFRAN) injection 4 mg, 4 mg, Intravenous, Q6H PRN, Blanca Carrillo PA-C, 4 mg at 02/03/21 1741    oxyCODONE (ROXICODONE) IR tablet 2 5 mg, 2 5 mg, Oral, Q4H PRN, DYANA Ramos-C, 2 5 mg at 02/02/21 0138    pantoprazole (PROTONIX) EC tablet 40 mg, 40 mg, Oral, Early Morning, KATHY Herring, 40 mg at 02/06/21 4145    prasugrel (EFFIENT) tablet 10 mg, 10 mg, Oral, Daily, DYANA Ramos-KATHIE, 10 mg at 02/06/21 9841    psyllium (METAMUCIL) 1 packet, 1 packet, Oral, Daily, PAULA RamosC, Stopped at 02/06/21 5660    rivaroxaban (XARELTO) tablet 15 mg, 15 mg, Oral, Daily With Orvis Serum, PA-C, 15 mg at 02/05/21 1732    saccharomyces boulardii (FLORASTOR) capsule 250 mg, 250 mg, Oral, BID, Blanca Carrillo PA-C, 250 mg at 02/06/21 5769     Labs & Results:    Results from last 7 days   Lab Units 02/03/21  0613 02/02/21 2011 02/02/21  1652   TROPONIN I ng/mL 0 06* 0 07* 0 06*     Results from last 7 days   Lab Units 02/06/21  0506 02/04/21  0228 02/02/21  1652   WBC Thousand/uL 7 38 8 38 7 88   HEMOGLOBIN g/dL 9 4* 13 2 10 9*   HEMATOCRIT % 29 5* 42 4 34 0*   PLATELETS Thousands/uL 185 245 229         Results from last 7 days   Lab Units 02/06/21  0506 02/05/21  1224 02/05/21  0547  01/31/21  0027   POTASSIUM mmol/L 3 8 3 1* 3 5  3 5   < > 4 2   CHLORIDE mmol/L 98* 96* 102  101   < > 101   CO2 mmol/L 30 32 30  30   < > 29   BUN mg/dL 5 6 7  7   < > 5   CREATININE mg/dL 0 60 0 71 0 62  0 58*   < > 0 81   CALCIUM mg/dL 8 1* 8 2* 8 5  8 4   < > 8 7   ALK PHOS U/L  --   --   --   --  79   ALT U/L  --   --   --   --  27   AST U/L  --   --   --   --  28    < > = values in this interval not displayed  Results from last 7 days   Lab Units 02/06/21  0506 02/05/21  1224 02/05/21  0547   MAGNESIUM mg/dL 2 0 1 6 1 6  1 6         EKG personally reviewed by Nancy Gandhi MD   Atrial fibrillation on admission  ECHO:  LEFT VENTRICLE:  Size was normal   Systolic function was at the lower limits of normal  Ejection fraction was estimated to be 50 %  There were no regional wall motion abnormalities  There was severe hypokinesis of the distal septum  Wall thickness was mildly increased  Features were consistent with a pseudonormal left ventricular filling pattern, with concomitant abnormal relaxation and increased filling pressure (grade 2 diastolic dysfunction)      RIGHT VENTRICLE:  The size was normal   Systolic function was normal      LEFT ATRIUM:  The atrium was mildly dilated      MITRAL VALVE:  There was mild to moderate regurgitation      TRICUSPID VALVE:  There was mild regurgitation  Pulmonary artery systolic pressure was markedly increased  The findings suggest severe pulmonary hypertension      IVC, HEPATIC VEINS:  The inferior vena cava was mildly dilated  Respirophasic changes were blunted (less than 50% variation)      COMPARISONS:  There has been no significant interval change  Comparison was made with the previous study of 03-Dec-2020  CARDIAC CATH (1/11/21):  CORONARY CIRCULATION:  Ostial left main: There was a 75 % stenosis  The lesion was complex, eccentric, and mildly calcified   This is a likely culprit for the patient's clinical presentation  An intervention was performed  IVUS 4 9mm2 Area stenosis 70%  Proximal LAD: There was a diffuse 95 % stenosis  Severe diffuse disease on IVUS  1st obtuse marginal: There was a 100 % stenosis  Mid RCA: There was a 100 % stenosis  This lesion is a chronic total occlusion  Graft to the 1st diagonal: There was a 100 % stenosis at the proximal anastomosis  It does not appear amenable to intervention  Graft to the 1st obtuse marginal: There was a 100 % stenosis at the proximal anastomosis  It does not appear amenable to intervention  Graft to the distal RCA: There was a 50 % stenosis in the distal third of the graft  It appears amenable to percutaneous intervention      1ST LESION INTERVENTIONS:  A successful balloon angioplasty with stent procedure was performed on the 95 % lesion in the proximal LAD  Following intervention there was an excellent angiographic appearance with a 0 % residual stenosis  A Resolute [place] Rx 2 5 x 22mm drug-eluting stent was placed across the lesion and deployed at a maximum inflation pressure of 12 irwin      2ND LESION INTERVENTIONS:  A successful drug-eluting stent procedure was performed on the 75 % lesion in the ostial left main  Following intervention there was an excellent angiographic appearance with a 0 % residual stenosis  A Resolute Alex Rx 3 5 x 08mm drug-eluting stent was placed across the lesion and deployed at a maximum inflation pressure of 12 irwin  Counseling / Coordination of Care  Total floor / unit time spent today 25 minutes  Greater than 50% of total time was spent with the patient and / or family counseling and / or coordination of care

## 2021-02-07 LAB
ANION GAP SERPL CALCULATED.3IONS-SCNC: 3 MMOL/L (ref 4–13)
BUN SERPL-MCNC: 5 MG/DL (ref 5–25)
CALCIUM SERPL-MCNC: 8.3 MG/DL (ref 8.3–10.1)
CHLORIDE SERPL-SCNC: 97 MMOL/L (ref 100–108)
CO2 SERPL-SCNC: 36 MMOL/L (ref 21–32)
CREAT SERPL-MCNC: 0.66 MG/DL (ref 0.6–1.3)
GFR SERPL CREATININE-BSD FRML MDRD: 83 ML/MIN/1.73SQ M
GLUCOSE SERPL-MCNC: 112 MG/DL (ref 65–140)
POTASSIUM SERPL-SCNC: 3.3 MMOL/L (ref 3.5–5.3)
SODIUM SERPL-SCNC: 136 MMOL/L (ref 136–145)

## 2021-02-07 PROCEDURE — 99232 SBSQ HOSP IP/OBS MODERATE 35: CPT | Performed by: INTERNAL MEDICINE

## 2021-02-07 PROCEDURE — 80048 BASIC METABOLIC PNL TOTAL CA: CPT | Performed by: PHYSICIAN ASSISTANT

## 2021-02-07 RX ORDER — BUMETANIDE 2 MG/1
2 TABLET ORAL DAILY
Status: DISCONTINUED | OUTPATIENT
Start: 2021-02-08 | End: 2021-02-13 | Stop reason: HOSPADM

## 2021-02-07 RX ORDER — POTASSIUM CHLORIDE 20 MEQ/1
40 TABLET, EXTENDED RELEASE ORAL DAILY
Status: DISCONTINUED | OUTPATIENT
Start: 2021-02-07 | End: 2021-02-13 | Stop reason: HOSPADM

## 2021-02-07 RX ORDER — DOXYCYCLINE HYCLATE 100 MG/1
100 CAPSULE ORAL EVERY 12 HOURS SCHEDULED
Status: DISCONTINUED | OUTPATIENT
Start: 2021-02-07 | End: 2021-02-13 | Stop reason: HOSPADM

## 2021-02-07 RX ADMIN — RIVAROXABAN 15 MG: 15 TABLET, FILM COATED ORAL at 17:20

## 2021-02-07 RX ADMIN — AMIODARONE HYDROCHLORIDE 200 MG: 200 TABLET ORAL at 17:21

## 2021-02-07 RX ADMIN — BUMETANIDE 2 MG: 0.25 INJECTION INTRAMUSCULAR; INTRAVENOUS at 17:21

## 2021-02-07 RX ADMIN — POTASSIUM CHLORIDE 40 MEQ: 1500 TABLET, EXTENDED RELEASE ORAL at 14:44

## 2021-02-07 RX ADMIN — Medication 250 MG: at 17:21

## 2021-02-07 RX ADMIN — LEVETIRACETAM 500 MG: 500 TABLET, FILM COATED ORAL at 08:11

## 2021-02-07 RX ADMIN — AMIODARONE HYDROCHLORIDE 200 MG: 200 TABLET ORAL at 11:58

## 2021-02-07 RX ADMIN — METOPROLOL SUCCINATE 25 MG: 25 TABLET, EXTENDED RELEASE ORAL at 08:11

## 2021-02-07 RX ADMIN — PRASUGREL HYDROCHLORIDE 10 MG: 10 TABLET, FILM COATED ORAL at 08:11

## 2021-02-07 RX ADMIN — ONDANSETRON 4 MG: 2 INJECTION INTRAMUSCULAR; INTRAVENOUS at 21:08

## 2021-02-07 RX ADMIN — DOXYCYCLINE 100 MG: 100 CAPSULE ORAL at 20:36

## 2021-02-07 RX ADMIN — BUMETANIDE 2 MG: 0.25 INJECTION INTRAMUSCULAR; INTRAVENOUS at 08:11

## 2021-02-07 RX ADMIN — Medication 250 MG: at 08:11

## 2021-02-07 RX ADMIN — ACETAMINOPHEN 650 MG: 325 TABLET, FILM COATED ORAL at 02:14

## 2021-02-07 RX ADMIN — LEVETIRACETAM 500 MG: 500 TABLET, FILM COATED ORAL at 20:36

## 2021-02-07 RX ADMIN — ATORVASTATIN CALCIUM 20 MG: 20 TABLET, FILM COATED ORAL at 17:20

## 2021-02-07 RX ADMIN — PANTOPRAZOLE SODIUM 40 MG: 40 TABLET, DELAYED RELEASE ORAL at 05:29

## 2021-02-07 RX ADMIN — AMIODARONE HYDROCHLORIDE 200 MG: 200 TABLET ORAL at 08:11

## 2021-02-07 NOTE — ASSESSMENT & PLAN NOTE
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

## 2021-02-07 NOTE — PROGRESS NOTES
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)    CAD (coronary artery disease)    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HCC)    Dysphagia    Hyponatremia    Urinary retention    Seizure-like activity (HCC)    Hyperlipidemia    Toxic metabolic encephalopathy    Aspiration pneumonia ([facility] [address] )      Plan:    1  Acute on chronic diastolic CHF - EF low normal at 50%  Given long extensive hospitalization, and runs of rapid atrial fibrillation, and over the last week started showing increasing signs of volume overload  Has now responded IV Bumex  We will give 1 more dose of IV Bumex, and transition over to oral Bumex tomorrow morning  For now we will do to mg daily  Continue to follow urine output, daily labs and weight  2   Atrial fibrillation with rapid ventricular response - She continues to go in and out of atrial fibrillation, but overnight converted back to sinus/paced rhythm  This does exacerbate her CHF  Management per electrophysiology  Currently getting an amiodarone load  Continue metoprolol and received digoxin as well  3   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- Noted to have a significant conversion pause  Now status post permanent pacemaker  4  CAD - She had a prior CABG back in 2011, in which was known that her LIMA close not long after surgery  She did have PCI to the LAD and diagonal branch, and then cardiac catheterization earlier this admission showed residual stenosis in her LAD and she is status post PCI to the LAD and distal left main  Continue dual anti-platelet therapy  EF low normal   On goal-directed medical therapy otherwise  Subjective:     Having a good response to IV Bumex  Has responded nicely to IV Bumex  Less volume overloaded  Feeling better    Denies chest pain or shortness of breath  Objective:     Vitals: Blood pressure 136/78, pulse 71, temperature 98 6 °F (37 °C), temperature source Oral, resp  rate 19, weight 62 4 kg (137 lb 8 oz), SpO2 99 %, not currently breastfeeding , Body mass index is 28 74 kg/m² ,   Orthostatic Blood Pressures      Most Recent Value   Blood Pressure  136/78 filed at 2021 0700   Patient Position - Orthostatic VS  Lying filed at 2021 0307            Intake/Output Summary (Last 24 hours) at 2021 1032  Last data filed at 2021 0538  Gross per 24 hour   Intake 1094 66 ml   Output 1825 ml   Net -730 34 ml       Telemetry review:  Ventricular paced rhythm over the last 24-48 hours  Physical Exam:    GEN: Karissa Martínez appears well, alert and oriented x 3, pleasant and cooperative   HEENT: pupils equal, round, and reactive to light; extraocular muscles intact  NECK: supple, no carotid bruits  Improved JVP  HEART: regular rhythm, normal S1 and S2, no significant murmurs, clicks, gallops or rubs   LUNGS:  Diminished bilaterally; no wheezes, rales, or rhonchi   ABDOMEN: normal bowel sounds, soft, no tenderness, no distention  EXTREMITIES: peripheral pulses normal; no clubbing, cyanosis    + edema bilaterally  NEURO: no focal findings   SKIN: normal without suspicious lesions on exposed skin    Medications:      Current Facility-Administered Medications:     acetaminophen (TYLENOL) tablet 650 mg, 650 mg, Oral, Q6H PRN, Tay Bravo PA-C, 650 mg at 21 0214    [] amiodarone (CORDARONE) 900 mg in dextrose 5 % 500 mL infusion, 1 mg/min, Intravenous, Continuous, Stopped at 21 0328 **FOLLOWED BY** amiodarone (CORDARONE) 900 mg in dextrose 5 % 500 mL infusion, 0 5 mg/min, Intravenous, Continuous, Leonila Foster PA-C, Last Rate: 16 7 mL/hr at 21 0654, 0 5 mg/min at 21 0654    amiodarone tablet 200 mg, 200 mg, Oral, TID With Meals, Prashant Quintero PA-C, 200 mg at 21 0811    atorvastatin (LIPITOR) tablet 20 mg, 20 mg, Oral, Daily With Faith Garcia PA-C, 20 mg at 02/06/21 1636    bumetanide (BUMEX) injection 2 mg, 2 mg, Intravenous, BID (diuretic), Choco Portillo MD, 2 mg at 02/07/21 0811    cefTRIAXone (ROCEPHIN) 1,000 mg in dextrose 5 % 50 mL IVPB, 1,000 mg, Intravenous, Q24H, Angelica Aguillon DO, Stopped at 02/06/21 2152    hydrALAZINE (APRESOLINE) injection 5 mg, 5 mg, Intravenous, Q6H PRN, Marlen DYANA Chen-C, Stopped at 02/04/21 0235    levETIRAcetam (KEPPRA) tablet 500 mg, 500 mg, Oral, Q12H [address], Marlen Life, PA-KATHIE, 500 mg at 02/07/21 0811    lidocaine (PF) (XYLOCAINE-MPF) 1 % injection 5 mL, 5 mL, Infiltration, Once, Marlenkeyona Chen PA-C    melatonin tablet 3 mg, 3 mg, Oral, HS, Mary Medina PA-C, 3 mg at 02/06/21 2153    metoprolol succinate (TOPROL-XL) 24 hr tablet 25 mg, 25 mg, Oral, Daily, Anthony Quintero PA-C, 25 mg at 02/07/21 0811    ondansetron (ZOFRAN) injection 4 mg, 4 mg, Intravenous, Q6H PRN, Marlen CARLY Chen, 4 mg at 02/03/21 1741    oxyCODONE (ROXICODONE) IR tablet 2 5 mg, 2 5 mg, Oral, Q4H PRN, Marlen DYANA Chen-C, 2 5 mg at 02/02/21 0138    pantoprazole (PROTONIX) EC tablet 40 mg, 40 mg, Oral, Early Morning, KATHY Macias, 40 mg at 02/07/21 0365    prasugrel (EFFIENT) tablet 10 mg, 10 mg, Oral, Daily, Marlen April PA-C, 10 mg at 02/07/21 6984    psyllium (METAMUCIL) 1 packet, 1 packet, Oral, Daily, MarlenPAULA WareC, Stopped at 02/06/21 7596    rivaroxaban (XARELTO) tablet 15 mg, 15 mg, Oral, Daily With Faith Garcia PA-C, 15 mg at 02/06/21 1636    saccharomyces boulardii (FLORASTOR) capsule 250 mg, 250 mg, Oral, BID, Marlen Chen PA-C, 250 mg at 02/07/21 8921     Labs & Results:    Results from last 7 days   Lab Units 02/03/21  0613 02/02/21 2011 02/02/21  1652   TROPONIN I ng/mL 0 06* 0 07* 0 06*     Results from last 7 days   Lab Units 02/06/21  0506 02/04/21  0228 02/02/21  1652 WBC Thousand/uL 7 38 8 38 7 88   HEMOGLOBIN g/dL 9 4* 13 2 10 9*   HEMATOCRIT % 29 5* 42 4 34 0*   PLATELETS Thousands/uL 185 245 229         Results from last 7 days   Lab Units 02/07/21  0529 02/06/21  0506 02/05/21  1224   POTASSIUM mmol/L 3 3* 3 8 3 1*   CHLORIDE mmol/L 97* 98* 96*   CO2 mmol/L 36* 30 32   BUN mg/dL 5 5 6   CREATININE mg/dL 0 66 0 60 0 71   CALCIUM mg/dL 8 3 8 1* 8 2*         Results from last 7 days   Lab Units 02/06/21  0506 02/05/21  1224 02/05/21  0547   MAGNESIUM mg/dL 2 0 1 6 1 6  1 6         EKG personally reviewed by Amelia Montoya MD   Atrial fibrillation on admission  ECHO:  LEFT VENTRICLE:  Size was normal   Systolic function was at the lower limits of normal  Ejection fraction was estimated to be 50 %  There were no regional wall motion abnormalities  There was severe hypokinesis of the distal septum  Wall thickness was mildly increased  Features were consistent with a pseudonormal left ventricular filling pattern, with concomitant abnormal relaxation and increased filling pressure (grade 2 diastolic dysfunction)      RIGHT VENTRICLE:  The size was normal   Systolic function was normal      LEFT ATRIUM:  The atrium was mildly dilated      MITRAL VALVE:  There was mild to moderate regurgitation      TRICUSPID VALVE:  There was mild regurgitation  Pulmonary artery systolic pressure was markedly increased  The findings suggest severe pulmonary hypertension      IVC, HEPATIC VEINS:  The inferior vena cava was mildly dilated  Respirophasic changes were blunted (less than 50% variation)      COMPARISONS:  There has been no significant interval change  Comparison was made with the previous study of 03-Dec-2020  CARDIAC CATH (1/11/21):  CORONARY CIRCULATION:  Ostial left main: There was a 75 % stenosis  The lesion was complex, eccentric, and mildly calcified  This is a likely culprit for the patient's clinical presentation  An intervention was performed   IVUS 4 9mm2 Area stenosis 70%  Proximal LAD: There was a diffuse 95 % stenosis  Severe diffuse disease on IVUS  1st obtuse marginal: There was a 100 % stenosis  Mid RCA: There was a 100 % stenosis  This lesion is a chronic total occlusion  Graft to the 1st diagonal: There was a 100 % stenosis at the proximal anastomosis  It does not appear amenable to intervention  Graft to the 1st obtuse marginal: There was a 100 % stenosis at the proximal anastomosis  It does not appear amenable to intervention  Graft to the distal RCA: There was a 50 % stenosis in the distal third of the graft  It appears amenable to percutaneous intervention      1ST LESION INTERVENTIONS:  A successful balloon angioplasty with stent procedure was performed on the 95 % lesion in the proximal LAD  Following intervention there was an excellent angiographic appearance with a 0 % residual stenosis  A Resolute Alex Rx 2 5 x 22mm drug-eluting stent was placed across the lesion and deployed at a maximum inflation pressure of 12 irwin      2ND LESION INTERVENTIONS:  A successful drug-eluting stent procedure was performed on the 75 % lesion in the ostial left main  Following intervention there was an excellent angiographic appearance with a 0 % residual stenosis  A Resolute Alex Rx 3 5 x 08mm drug-eluting stent was placed across the lesion and deployed at a maximum inflation pressure of 12 irwin  Counseling / Coordination of Care  Total floor / unit time spent today 25 minutes  Greater than 50% of total time was spent with the patient and / or family counseling and / or coordination of care

## 2021-02-07 NOTE — ASSESSMENT & PLAN NOTE
Wt Readings from Last 3 Encounters:   02/06/21 65 kg (143 lb 4 8 oz)   01/27/21 70 kg (154 lb 4 8 oz)   01/21/21 63 7 kg (140 lb 6 4 oz)   Acute on chronic systolic/diastolic HF, ef of 49%; possibly precipitated by afib with rvr  Cardiology on board, on IV bumex  Cont I/O, daily wts  Good diuretic response net neg >5L

## 2021-02-07 NOTE — ASSESSMENT & PLAN NOTE
Wt Readings from Last 3 Encounters:   02/07/21 62 4 kg (137 lb 8 oz)   01/27/21 70 kg (154 lb 4 8 oz)   01/21/21 63 7 kg (140 lb 6 4 oz)   Acute on chronic systolic/diastolic HF, ef of 13%; possibly precipitated by afib with rvr  Cardiology on board, transitioned to po starting tomorrow  Cont I/O, daily wts  Good diuretic response net neg >5L

## 2021-02-07 NOTE — PROGRESS NOTES
Progress Note - Concepcion Jalloh 1940, 79 y o  female MRN: [id number]    Unit/Bed#: [facility] 531-01 Encounter: [id number]    Primary Care Provider: Alexy Phipps MD   Date and time admitted to hospital: 1/29/2021  3:37 PM        * A-fib [facility])  Assessment & Plan  · W/ recurring episodes of RVR, parosxymal  Somewhat lessened, off amiodarone gtt on PO  · Remains in SR, rate controlled  · EP on board  · Cont metoprolol    · A/c with xarelto  · Cont telemetry monitoring  · Cont monitor of electrolytes, replete potassium      Seizure-like activity (HCC)  Assessment & Plan  · Rapid response initiated while pt was at [facility] d/t seizure like activity  · S/p Neurology consult  · S/p EEG  · Not sure if able to have MRI as positive PPM; s/p CT head  · On keppra BID  · Sz precautions  · PENNDOT filed per neurology  · No recurring sz like activity      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)  Assessment & Plan  Wt Readings from Last 3 Encounters:   02/07/21 62 4 kg (137 lb 8 oz)   01/27/21 70 kg (154 lb 4 8 oz)   01/21/21 63 7 kg (140 lb 6 4 oz)   Acute on chronic systolic/diastolic HF, ef of 14%; possibly precipitated by afib with rvr  Cardiology on board, transitioned to po starting tomorrow  Cont I/O, daily wts  Good diuretic response net neg >5L        CAD (coronary artery disease)  Assessment & Plan  S/p cardiac cath/PCI/LAMINE x 2 1/11/2021  On effient, per cards on past admission d/c of asa given on xarelto as well  Pt is CP free    Urinary retention  Assessment & Plan  Voiding trial tomorrow  Pt c/o pyuria but unsure if may have been related to positioning dominguez catheter as once adjusted symptoms resolved      Regardless + pyuria, 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; sens to tetracycline, change abx to doxy, pending urine cx  No systemic illness to suggest acute infxn    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[address] )  Assessment & Plan  · + Pacemaker in place, 1/19/2021    Dysphagia  Assessment & Plan  · Resolved; s/p speech/GI eval ; cleared for upgrade of diet to regular     Hyponatremia  Assessment & Plan  Secondary to volume overload,   Resolved    Hyperlipidemia  Assessment & Plan  · Continue statin    Essential hypertension  Assessment & Plan  · Stable      VTE Pharmacologic Prophylaxis:   Pharmacologic: Rivaroxaban (Xarelto)  Mechanical VTE Prophylaxis in Place: Yes    Patient Centered Rounds: I have performed bedside rounds with nursing staff today  Discussions with Specialists or Other Care Team Provider:     Education and Discussions with Family / Patient: patient    Time Spent for Care: 30 minutes  More than 50% of total time spent on counseling and coordination of care as described above  Current Length of Stay: 9 day(s)    Current Patient Status: Inpatient   Certification Statement: The patient will continue to require additional inpatient hospital stay due to Parosxymal afib w / rvr, ADHF    Discharge Plan: Snf vs home with homecare    Code Status: Level 3 - DNAR and DNI      Subjective:   Less recurring episodes of afib w/ rvr  Denies any cardiac symptoms    Objective:     Vitals:   Temp (24hrs), Av 5 °F (36 9 °C), Min:98 °F (36 7 °C), Max:98 8 °F (37 1 °C)    Temp:  [98 °F (36 7 °C)-98 8 °F (37 1 °C)] 98 6 °F (37 °C)  HR:  [69-71] 69  Resp:  [15-20] 18  BP: (112-140)/(55-78) 132/72  SpO2:  [99 %-100 %] 100 %  Body mass index is 28 74 kg/m²  Input and Output Summary (last 24 hours): Intake/Output Summary (Last 24 hours) at 2021 1508  Last data filed at 2021 1346  Gross per 24 hour   Intake 1131 66 ml   Output 2325 ml   Net -1193 34 ml       Physical Exam:     Physical Exam  Constitutional:       Appearance: Normal appearance  Neck:      Musculoskeletal: Normal range of motion and neck supple  Cardiovascular:      Rate and Rhythm: Normal rate and regular rhythm  Pulses: Normal pulses  Pulmonary:      Effort: No respiratory distress  Breath sounds: No wheezing or rales     Abdominal: General: Abdomen is flat  Bowel sounds are normal  There is no distension  Palpations: Abdomen is soft  Tenderness: There is no abdominal tenderness  There is no guarding  Musculoskeletal:      Comments: Much improved LE edema   Skin:     General: Skin is warm and dry  Neurological:      General: No focal deficit present  Mental Status: She is alert and oriented to person, place, and time  Additional Data:     Labs:    Results from last 7 days   Lab Units 02/06/21  0506  02/02/21  1652   WBC Thousand/uL 7 38   < > 7 88   HEMOGLOBIN g/dL 9 4*   < > 10 9*   HEMATOCRIT % 29 5*   < > 34 0*   PLATELETS Thousands/uL 185   < > 229   NEUTROS PCT %  --   --  43   LYMPHS PCT %  --   --  45*   MONOS PCT %  --   --  8   EOS PCT %  --   --  2    < > = values in this interval not displayed  Results from last 7 days   Lab Units 02/07/21  0529   SODIUM mmol/L 136   POTASSIUM mmol/L 3 3*   CHLORIDE mmol/L 97*   CO2 mmol/L 36*   BUN mg/dL 5   CREATININE mg/dL 0 66   ANION GAP mmol/L 3*   CALCIUM mg/dL 8 3   GLUCOSE RANDOM mg/dL 112         Results from last 7 days   Lab Units 02/06/21  1048 02/06/21  0634 02/05/21  2122 02/05/21  1602 02/05/21  1104 02/05/21  0739 02/04/21  1525 02/04/21  1031 02/04/21  0626 02/03/21  2119 02/03/21  1510 02/03/21  1122   POC GLUCOSE mg/dl 114 119 147* 141* 120 121 119 117 108 96 129 163*         Results from last 7 days   Lab Units 02/01/21  0612 02/01/21  0419   PROCALCITONIN ng/ml 0 12 0 07           * I Have Reviewed All Lab Data Listed Above  * Additional Pertinent Lab Tests Reviewed:  All Labs Within Last 24 Hours Reviewed    Imaging:    Imaging Reports Reviewed Today Include:   Imaging Personally Reviewed by Myself Includes:      Recent Cultures (last 7 days):           Last 24 Hours Medication List:   Current Facility-Administered Medications   Medication Dose Route Frequency Provider Last Rate    acetaminophen  650 mg Oral Q6H PRN Prashant Mckeon PA-C  amiodarone  200 mg Oral TID With Meals Anthony Quintero PA-C      atorvastatin  20 mg Oral Daily With [employer], CARLY      bumetanide  2 mg Intravenous BID (diuretic) MD Tracy Lindquist ON 2/8/2021] bumetanide  2 mg Oral Daily Renee Cotto MD      doxycycline hyclate  100 mg Oral Q12H [facility] Beatriz Vidal DO      hydrALAZINE  5 mg Intravenous Q6H PRN Norman Flood PA-C      levETIRAcetam  500 mg Oral Q12H [facility] Norman Flood PA-C      lidocaine (PF)  5 mL Infiltration Once Norman Flood PA-C      melatonin  3 mg Oral HS Mary Medina PA-C      metoprolol succinate  25 mg Oral Daily Anthony Quintero PA-C      ondansetron  4 mg Intravenous Q6H PRN Norman Flood PA-C      oxyCODONE  2 5 mg Oral Q4H PRN Norman Flood PA-C      pantoprazole  40 mg Oral Early Morning Rawleigh Kaylah, CRNP      potassium chloride  40 mEq Oral Daily Beatriz Vidal DO      prasugrel  10 mg Oral Daily Norman Flood PA-C      psyllium  1 packet Oral Daily Norman Flood PA-C      rivaroxaban  15 mg Oral Daily With [employer], CARLY      saccharomyces boulardii  250 mg Oral BID Norman Flood PA-C          Today, Patient Was Seen By: Beatriz Vidal DO    ** Please Note: Dictation voice to text software may have been used in the creation of this document   **

## 2021-02-07 NOTE — PROGRESS NOTES
Progress Note - Aleshia Barrera 1940, 79 y o  female MRN: [id number]    Unit/Bed#: [facility] 531-01 Encounter: [id number]    Primary Care Provider: Linwood Huggins MD   Date and time admitted to hospital: 1/29/2021  3:37 PM        * A-fib [facility])  Assessment & Plan  · W/ recurring episodes of RVR, parosxymal  Today much improved remains in SR, rate controlled  · EP on board  · Remains on amiodarone gtt, rate titrated down to 0 5; cont po  Cont metoprolol    · A/c with xarelto  · Cont telemetry monitoring      Seizure-like activity (HCC)  Assessment & Plan  · Rapid response initiated while pt was at 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)  Assessment & Plan  Wt Readings from Last 3 Encounters:   02/06/21 65 kg (143 lb 4 8 oz)   01/27/21 70 kg (154 lb 4 8 oz)   01/21/21 63 7 kg (140 lb 6 4 oz)   Acute on chronic systolic/diastolic HF, ef of 51%; possibly precipitated by afib with rvr  Cardiology on board, on IV bumex  Cont I/O, daily wts  Good diuretic response net neg >5L        CAD (coronary artery disease)  Assessment & Plan  S/p cardiac cath/PCI/LAMINE x 2 1/11/2021  On effient, per cards on past admission d/c of asa given on xarelto as well  Pt is CP free    Urinary retention  Assessment & Plan  Voiding trial tomorrow  Pt c/o pyuria but unsure if may have been related to positioning dominguez catheter as once adjusted symptoms resolved      Regardless + pyuria, will empirically place on iv abx, d/c if urine cx is neg    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    Dysphagia  Assessment & Plan  · Resolved; s/p speech/GI eval 2/2; cleared for upgrade of diet to regular     Hyponatremia  Assessment & Plan  Secondary to volume overload, Cont diuresis  No change in MS    Hyperlipidemia  Assessment & Plan  · Continue statin    Essential hypertension  Assessment & Plan  · Stable      VTE Pharmacologic Prophylaxis:   Pharmacologic: Rivaroxaban (Xarelto)  Mechanical VTE Prophylaxis in Place: No    Patient Centered Rounds: I have performed bedside rounds with nursing staff today  Discussions with Specialists or Other Care Team Provider:     Education and Discussions with Family / Patient: Patient    Time Spent for Care: 30 minutes  More than 50% of total time spent on counseling and coordination of care as described above  Current Length of Stay: 8 day(s)    Current Patient Status: Inpatient   Certification Statement: The patient will continue to require additional inpatient hospital stay due to Acute illness    Discharge Plan:     Code Status: Level 3 - DNAR and DNI      Subjective:   Sitting in chair  For the most part today has remains in SR, rate controlled    Objective:     Vitals:   Temp (24hrs), Av 8 °F (37 1 °C), Min:97 7 °F (36 5 °C), Max:99 6 °F (37 6 °C)    Temp:  [97 7 °F (36 5 °C)-99 6 °F (37 6 °C)] 98 4 °F (36 9 °C)  HR:  [69-71] 70  Resp:  [15-20] 15  BP: (112-152)/(53-92) 112/55  SpO2:  [93 %-99 %] 99 %  Body mass index is 29 95 kg/m²  Input and Output Summary (last 24 hours): Intake/Output Summary (Last 24 hours) at 2021 1906  Last data filed at 2021 1841  Gross per 24 hour   Intake 1239 25 ml   Output 1100 ml   Net 139 25 ml       Physical Exam:     Physical Exam  Constitutional:       Appearance: Normal appearance  Neck:      Musculoskeletal: Normal range of motion and neck supple  Cardiovascular:      Rate and Rhythm: Normal rate and regular rhythm  Pulmonary:      Effort: Pulmonary effort is normal       Comments: Mild bibasilar crackles  Abdominal:      General: Abdomen is flat  Bowel sounds are normal  There is no distension  Palpations: Abdomen is soft  Tenderness:  There is no abdominal tenderness  There is no guarding  Musculoskeletal:      Right lower leg: Edema present  Left lower leg: Edema present  Comments: Improved LE edema   Skin:     General: Skin is warm and dry  Neurological:      General: No focal deficit present  Mental Status: She is alert and oriented to person, place, and time  Additional Data:     Labs:    Results from last 7 days   Lab Units 02/06/21  0506  02/02/21  1652  01/31/21  0027   WBC Thousand/uL 7 38   < > 7 88   < > 10 77*   HEMOGLOBIN g/dL 9 4*   < > 10 9*   < > 11 0*   HEMATOCRIT % 29 5*   < > 34 0*   < > 34 7*   PLATELETS Thousands/uL 185   < > 229   < > 325   BANDS PCT %  --   --   --   --  4   NEUTROS PCT %  --   --  43   < >  --    LYMPHS PCT %  --   --  45*   < >  --    LYMPHO PCT %  --   --   --   --  23   MONOS PCT %  --   --  8   < >  --    MONO PCT %  --   --   --   --  8   EOS PCT %  --   --  2   < > 0    < > = values in this interval not displayed  Results from last 7 days   Lab Units 02/06/21  0506  01/31/21  0027   SODIUM mmol/L 131*   < > 135*   POTASSIUM mmol/L 3 8   < > 4 2   CHLORIDE mmol/L 98*   < > 101   CO2 mmol/L 30   < > 29   BUN mg/dL 5   < > 5   CREATININE mg/dL 0 60   < > 0 81   ANION GAP mmol/L 3*   < > 5   CALCIUM mg/dL 8 1*   < > 8 7   ALBUMIN g/dL  --   --  2 9*   TOTAL BILIRUBIN mg/dL  --   --  1 06*   ALK PHOS U/L  --   --  79   ALT U/L  --   --  27   AST U/L  --   --  28   GLUCOSE RANDOM mg/dL 202*   < > 139    < > = values in this interval not displayed           Results from last 7 days   Lab Units 02/06/21  1048 02/06/21  2510 02/05/21  2122 02/05/21  1602 02/05/21  1104 02/05/21  0739 02/04/21  1525 02/04/21  1031 02/04/21  0626 02/03/21  2119 02/03/21  1510 02/03/21  1122   POC GLUCOSE mg/dl 114 119 147* 141* 120 121 119 117 108 96 129 163*         Results from last 7 days   Lab Units 02/01/21  0612 02/01/21  0419 01/31/21  0616 01/31/21  0604 01/31/21  0027   LACTIC ACID mmol/L  --   --   -- 1 1 2 5*   PROCALCITONIN ng/ml 0 12 0 07 <0 05  --   --            * I Have Reviewed All Lab Data Listed Above  * Additional Pertinent Lab Tests Reviewed: All Labs Within Last 24 Hours Reviewed    Imaging:    Imaging Reports Reviewed Today Include:   Imaging Personally Reviewed by Myself Includes:      Recent Cultures (last 7 days):           Last 24 Hours Medication List:   Current Facility-Administered Medications   Medication Dose Route Frequency Provider Last Rate    acetaminophen  650 mg Oral Q6H PRN Norrine Brilliant, PA-C      amiodarone  0 5 mg/min Intravenous Continuous Kattyia MARCO Foster PA-C 0 5 mg/min (02/06/21 0654)    amiodarone  200 mg Oral TID With Meals Anthony FriendCARLY      atorvastatin  20 mg Oral Daily With [employer], PA-KATHIE      bumetanide  2 mg Intravenous BID (diuretic) Nancy Gandhi MD      cefTRIAXone  1,000 mg Intravenous Q24H Yasmani Muñoz DO      hydrALAZINE  5 mg Intravenous Q6H PRN Norrine Brilliant, PA-C      levETIRAcetam  500 mg Oral Q12H [address] Norrine Columbia, PA-C      lidocaine (PF)  5 mL Infiltration Once Norrine Columbia, PA-C      melatonin  3 mg Oral HS Mary Medina PA-C      metoprolol succinate  25 mg Oral Daily Anthony Quintreo PA-C      ondansetron  4 mg Intravenous Q6H PRN Norrine Brilliant, PA-C      oxyCODONE  2 5 mg Oral Q4H PRN Norrine Brilliant, PA-C      pantoprazole  40 mg Oral Early Morning KATHY Vincent      prasugrel  10 mg Oral Daily Norrine Brilliant, PA-C      psyllium  1 packet Oral Daily Norrine Brilliant, PA-C      rivaroxaban  15 mg Oral Daily With [employer], PA-C      saccharomyces boulardii  250 mg Oral BID Norrine Hunter, PA-C          Today, Patient Was Seen By: Yasmani Muñoz DO    ** Please Note: Dictation voice to text software may have been used in the creation of this document   **

## 2021-02-07 NOTE — ASSESSMENT & PLAN NOTE
· W/ recurring episodes of RVR, parosxymal  Somewhat lessened, off amiodarone gtt on PO  · Remains in SR, rate controlled    · EP on board  · Cont metoprolol    · A/c with xarelto  · Cont telemetry monitoring

## 2021-02-07 NOTE — ASSESSMENT & PLAN NOTE
· Rapid response initiated while pt was at [facility] d/t seizure like activity  · S/p Neurology consult  · S/p EEG  · Not sure if able to have MRI as positive PPM; s/p CT head  · On keppra BID  · Sz precautions  · PENNDOT filed per neurology  · No recurring sz like activity

## 2021-02-07 NOTE — ASSESSMENT & PLAN NOTE
· Rapid response initiated while pt was at [facility] d/t seizure like activity  · S/p Neurology consult  · S/p EEG  · Not sure if able to have MRI as positive PPM; s/p CT head  · On keppra BID  · Sz precautions  · PENNDOT filed per neurology  · No recurring sz like activity

## 2021-02-07 NOTE — ASSESSMENT & PLAN NOTE
Voiding trial tomorrow  Pt c/o pyuria but unsure if may have been related to positioning dominguez catheter as once adjusted symptoms resolved      Regardless + pyuria, will empirically place on iv abx, d/c if urine cx is neg

## 2021-02-07 NOTE — PLAN OF CARE
Problem: Prexisting or High Potential for Compromised Skin Integrity  Goal: Skin integrity is maintained or improved  Description: INTERVENTIONS:  - Identify patients at risk for skin breakdown  - Assess and monitor skin integrity  - Assess and monitor nutrition and hydration status  - Monitor labs   - Assess for incontinence   - Turn and reposition patient  - Assist with mobility/ambulation  - Relieve pressure over bony prominences  - Avoid friction and shearing  - Provide appropriate hygiene as needed including keeping skin clean and dry  - Evaluate need for skin moisturizer/barrier cream  - Collaborate with interdisciplinary team   - Patient/family teaching  - Consider wound care consult   Outcome: Progressing     Problem: Neurological Deficit  Goal: Neurological status is stable or improving  Description: Interventions:  - Monitor and assess patient's level of consciousness, motor function, sensory function, and level of assistance needed for ADLs  - Monitor and report changes from baseline  Collaborate with interdisciplinary team to initiate plan and implement interventions as ordered  - Provide and maintain a safe environment  - Consider seizure precautions  - Consider fall precautions  - Consider aspiration precautions  - Consider bleeding precautions  Outcome: Progressing     Problem: Activity Intolerance/Impaired Mobility  Goal: Mobility/activity is maintained at optimum level for patient  Description: Interventions:  - Assess and monitor patient  barriers to mobility and need for assistive/adaptive devices  - Assess patient's emotional response to limitations  - Collaborate with interdisciplinary team and initiate plans and interventions as ordered  - Encourage independent activity per ability   - Maintain proper body alignment  - Perform active/passive rom as tolerated/ordered    - Plan activities to conserve energy   - Turn patient as appropriate  Outcome: Progressing     Problem: Communication Impairment  Goal: Ability to express needs and understand communication  Description: Assess patient's communication skills and ability to understand information  Patient will demonstrate use of effective communication techniques, alternative methods of communication and understanding even if not able to speak  - Encourage communication and provide alternate methods of communication as needed  - Collaborate with case management/ for discharge needs  - Include patient/family/caregiver in decisions related to communication  Outcome: Progressing     Problem: Potential for Aspiration  Goal: Non-ventilated patient's risk of aspiration is minimized  Description: Assess and monitor vital signs, respiratory status, and labs (WBC)  Monitor for signs of aspiration (tachypnea, cough, rales, wheezing, cyanosis, fever)  - Assess and monitor patient's ability to swallow  - Place patient up in chair to eat if possible  - HOB up at 90 degrees to eat if unable to get patient up into chair   - Supervise patient during oral intake  - Instruct patient/ family to take small bites  - Instruct patient/ family to take small single sips when taking liquids  - Follow patient-specific strategies generated by speech pathologist   Outcome: Progressing     Problem: Nutrition  Goal: Nutrition/Hydration status is improving  Description: Monitor and assess patient's nutrition/hydration status for malnutrition (ex- brittle hair, bruises, dry skin, pale skin and conjunctiva, muscle wasting, smooth red tongue, and disorientation)  Collaborate with interdisciplinary team and initiate plan and interventions as ordered  Monitor patient's weight and dietary intake as ordered or per policy  Utilize nutrition screening tool and intervene per policy  Determine patient's food preferences and provide high-protein, high-caloric foods as appropriate       - Assist patient with eating   - Allow adequate time for meals   - Encourage patient to take dietary supplement as ordered  - Collaborate with clinical nutritionist   - Include patient/family/caregiver in decisions related to nutrition  Outcome: Progressing     Problem: PAIN - ADULT  Goal: Verbalizes/displays adequate comfort level or baseline comfort level  Description: Interventions:  - Encourage patient to monitor pain and request assistance  - Assess pain using appropriate pain scale  - Administer analgesics based on type and severity of pain and evaluate response  - Implement non-pharmacological measures as appropriate and evaluate response  - Consider cultural and social influences on pain and pain management  - Notify physician/advanced practitioner if interventions unsuccessful or patient reports new pain  Outcome: Progressing     Problem: INFECTION - ADULT  Goal: Absence or prevention of progression during hospitalization  Description: INTERVENTIONS:  - Assess and monitor for signs and symptoms of infection  - Monitor lab/diagnostic results  - Monitor all insertion sites, i e  indwelling lines, tubes, and drains  - Monitor endotracheal if appropriate and nasal secretions for changes in amount and color  - [place] appropriate cooling/warming therapies per order  - Administer medications as ordered  - Instruct and encourage patient and family to use good hand hygiene technique  - Identify and instruct in appropriate isolation precautions for identified infection/condition  Outcome: Progressing  Goal: Absence of fever/infection during neutropenic period  Description: INTERVENTIONS:  - Monitor WBC    Outcome: Progressing     Problem: SAFETY ADULT  Goal: Patient will remain free of falls  Description: INTERVENTIONS:  - Assess patient frequently for physical needs  -  Identify cognitive and physical deficits and behaviors that affect risk of falls    -  [place] fall precautions as indicated by assessment   - Educate patient/family on patient safety including physical limitations  - Instruct patient to call for assistance with activity based on assessment  - Modify environment to reduce risk of injury  - Consider OT/PT consult to assist with strengthening/mobility  Outcome: Progressing  Goal: Maintain or return to baseline ADL function  Description: INTERVENTIONS:  -  Assess patient's ability to carry out ADLs; assess patient's baseline for ADL function and identify physical deficits which impact ability to perform ADLs (bathing, care of mouth/teeth, toileting, grooming, dressing, etc )  - Assess/evaluate cause of self-care deficits   - Assess range of motion  - Assess patient's mobility; develop plan if impaired  - Assess patient's need for assistive devices and provide as appropriate  - Encourage maximum independence but intervene and supervise when necessary  - Involve family in performance of ADLs  - Assess for home care needs following discharge   - Consider OT consult to assist with ADL evaluation and planning for discharge  - Provide patient education as appropriate  Outcome: Progressing  Goal: Maintain or return mobility status to optimal level  Description: INTERVENTIONS:  - Assess patient's baseline mobility status (ambulation, transfers, stairs, etc )    - Identify cognitive and physical deficits and behaviors that affect mobility  - Identify mobility aids required to assist with transfers and/or ambulation (gait belt, sit-to-stand, lift, walker, cane, etc )  - [place] fall precautions as indicated by assessment  - Record patient progress and toleration of activity level on Mobility SBAR; progress patient to next Phase/Stage  - Instruct patient to call for assistance with activity based on assessment  - Consider rehabilitation consult to assist with strengthening/weightbearing, etc   Outcome: Progressing     Problem: DISCHARGE PLANNING  Goal: Discharge to home or other facility with appropriate resources  Description: INTERVENTIONS:  - Identify barriers to discharge w/patient and caregiver  - Arrange for needed discharge resources and transportation as appropriate  - Identify discharge learning needs (meds, wound care, etc )  - Arrange for interpretive services to assist at discharge as needed  - Refer to Case Management Department for coordinating discharge planning if the patient needs post-hospital services based on physician/advanced practitioner order or complex needs related to functional status, cognitive ability, or social support system  Outcome: Progressing     Problem: Knowledge Deficit  Goal: Patient/family/caregiver demonstrates understanding of disease process, treatment plan, medications, and discharge instructions  Description: Complete learning assessment and assess knowledge base  Interventions:  - Provide teaching at level of understanding  - Provide teaching via preferred learning methods  Outcome: Progressing     Problem: Potential for Falls  Goal: Patient will remain free of falls  Description: INTERVENTIONS:  - Assess patient frequently for physical needs  -  Identify cognitive and physical deficits and behaviors that affect risk of falls  -  [place] fall precautions as indicated by assessment   - Educate patient/family on patient safety including physical limitations  - Instruct patient to call for assistance with activity based on assessment  - Modify environment to reduce risk of injury  - Consider OT/PT consult to assist with strengthening/mobility  Outcome: Progressing     Problem: Nutrition/Hydration-ADULT  Goal: Nutrient/Hydration intake appropriate for improving, restoring or maintaining nutritional needs  Description: Monitor and assess patient's nutrition/hydration status for malnutrition  Collaborate with interdisciplinary team and initiate plan and interventions as ordered  Monitor patient's weight and dietary intake as ordered or per policy  Utilize nutrition screening tool and intervene as necessary   Determine patient's food preferences and provide high-protein, high-caloric foods as appropriate       INTERVENTIONS:  - Monitor oral intake, urinary output, labs, and treatment plans  - Assess nutrition and hydration status and recommend course of action  - Evaluate amount of meals eaten  - Assist patient with eating if necessary   - Allow adequate time for meals  - Recommend/ encourage appropriate diets, oral nutritional supplements, and vitamin/mineral supplements  - Order, calculate, and assess calorie counts as needed  - Recommend, monitor, and adjust tube feedings and TPN/PPN based on assessed needs  - Assess need for intravenous fluids  - Provide specific nutrition/hydration education as appropriate  - Include patient/family/caregiver in decisions related to nutrition  Outcome: Progressing     Problem: CARDIOVASCULAR - ADULT  Goal: Maintains optimal cardiac output and hemodynamic stability  Description: INTERVENTIONS:  - Monitor I/O, vital signs and rhythm  - Monitor for S/S and trends of decreased cardiac output  - Administer and titrate ordered vasoactive medications to optimize hemodynamic stability  - Assess quality of pulses, skin color and temperature  - Assess for signs of decreased coronary artery perfusion  - Instruct patient to report change in severity of symptoms  Outcome: Progressing  Goal: Absence of cardiac dysrhythmias or at baseline rhythm  Description: INTERVENTIONS:  - Continuous cardiac monitoring, vital signs, obtain 12 lead EKG if ordered  - Administer antiarrhythmic and heart rate control medications as ordered  - Monitor electrolytes and administer replacement therapy as ordered  Outcome: Progressing     Problem: RESPIRATORY - ADULT  Goal: Achieves optimal ventilation and oxygenation  Description: INTERVENTIONS:  - Assess for changes in respiratory status  - Assess for changes in mentation and behavior  - Position to facilitate oxygenation and minimize respiratory effort  - Oxygen administered by appropriate delivery if ordered  - Initiate smoking cessation education as indicated  - Encourage broncho-pulmonary hygiene including cough, deep breathe, Incentive Spirometry  - Assess the need for suctioning and aspirate as needed  - Assess and instruct to report SOB or any respiratory difficulty  - Respiratory Therapy support as indicated  Outcome: Progressing

## 2021-02-08 LAB
ANION GAP SERPL CALCULATED.3IONS-SCNC: 4 MMOL/L (ref 4–13)
BUN SERPL-MCNC: 7 MG/DL (ref 5–25)
CALCIUM SERPL-MCNC: 8.8 MG/DL (ref 8.3–10.1)
CHLORIDE SERPL-SCNC: 99 MMOL/L (ref 100–108)
CO2 SERPL-SCNC: 34 MMOL/L (ref 21–32)
CREAT SERPL-MCNC: 0.72 MG/DL (ref 0.6–1.3)
GFR SERPL CREATININE-BSD FRML MDRD: 79 ML/MIN/1.73SQ M
GLUCOSE SERPL-MCNC: 104 MG/DL (ref 65–140)
MAGNESIUM SERPL-MCNC: 1.6 MG/DL (ref 1.6–2.6)
POTASSIUM SERPL-SCNC: 3.8 MMOL/L (ref 3.5–5.3)
SODIUM SERPL-SCNC: 137 MMOL/L (ref 136–145)

## 2021-02-08 PROCEDURE — 99232 SBSQ HOSP IP/OBS MODERATE 35: CPT | Performed by: INTERNAL MEDICINE

## 2021-02-08 PROCEDURE — 99024 POSTOP FOLLOW-UP VISIT: CPT | Performed by: INTERNAL MEDICINE

## 2021-02-08 PROCEDURE — 83735 ASSAY OF MAGNESIUM: CPT | Performed by: INTERNAL MEDICINE

## 2021-02-08 PROCEDURE — 80048 BASIC METABOLIC PNL TOTAL CA: CPT | Performed by: INTERNAL MEDICINE

## 2021-02-08 RX ORDER — LANOLIN ALCOHOL/MO/W.PET/CERES
3 CREAM (GRAM) TOPICAL
Status: DISCONTINUED | OUTPATIENT
Start: 2021-02-08 | End: 2021-02-13 | Stop reason: HOSPADM

## 2021-02-08 RX ADMIN — Medication 250 MG: at 17:39

## 2021-02-08 RX ADMIN — AMIODARONE HYDROCHLORIDE 200 MG: 200 TABLET ORAL at 17:39

## 2021-02-08 RX ADMIN — PANTOPRAZOLE SODIUM 40 MG: 40 TABLET, DELAYED RELEASE ORAL at 05:07

## 2021-02-08 RX ADMIN — LEVETIRACETAM 500 MG: 500 TABLET, FILM COATED ORAL at 20:18

## 2021-02-08 RX ADMIN — AMIODARONE HYDROCHLORIDE 200 MG: 200 TABLET ORAL at 12:14

## 2021-02-08 RX ADMIN — MAGNESIUM OXIDE TAB 400 MG (241.3 MG ELEMENTAL MG) 400 MG: 400 (241.3 MG) TAB at 17:39

## 2021-02-08 RX ADMIN — Medication 250 MG: at 08:45

## 2021-02-08 RX ADMIN — BUMETANIDE 2 MG: 2 TABLET ORAL at 08:44

## 2021-02-08 RX ADMIN — DOXYCYCLINE 100 MG: 100 CAPSULE ORAL at 20:18

## 2021-02-08 RX ADMIN — RIVAROXABAN 15 MG: 15 TABLET, FILM COATED ORAL at 17:39

## 2021-02-08 RX ADMIN — AMIODARONE HYDROCHLORIDE 200 MG: 200 TABLET ORAL at 06:30

## 2021-02-08 RX ADMIN — DOXYCYCLINE 100 MG: 100 CAPSULE ORAL at 08:44

## 2021-02-08 RX ADMIN — LEVETIRACETAM 500 MG: 500 TABLET, FILM COATED ORAL at 08:44

## 2021-02-08 RX ADMIN — ACETAMINOPHEN 650 MG: 325 TABLET, FILM COATED ORAL at 03:21

## 2021-02-08 RX ADMIN — PRASUGREL HYDROCHLORIDE 10 MG: 10 TABLET, FILM COATED ORAL at 08:45

## 2021-02-08 RX ADMIN — METOPROLOL SUCCINATE 25 MG: 25 TABLET, EXTENDED RELEASE ORAL at 08:45

## 2021-02-08 RX ADMIN — ATORVASTATIN CALCIUM 20 MG: 20 TABLET, FILM COATED ORAL at 17:39

## 2021-02-08 RX ADMIN — MAGNESIUM OXIDE TAB 400 MG (241.3 MG ELEMENTAL MG) 400 MG: 400 (241.3 MG) TAB at 12:14

## 2021-02-08 RX ADMIN — POTASSIUM CHLORIDE 40 MEQ: 1500 TABLET, EXTENDED RELEASE ORAL at 08:45

## 2021-02-08 NOTE — NURSING NOTE
Patient states that she is going home tomorrow  Concerned for safety risk at home for patient is unable to cover herself up with a blanket and grab a drink of water on her own  Patient is a MAX assist x2 OOB to use the toilet or go into the chair  Patient was educated on how to properly use the walker and still walks with her head down facing the floor and her buttocks sticking outside of the walker  Patient is not receptive of education  Concerned for falling and is to weak to ambulate on her own

## 2021-02-08 NOTE — PROGRESS NOTES
Cardiology Progress Note - Heather Godinez 79 y o  female MRN: [id number]    Unit/Bed#: [facility] 531-01 Encounter: [id number]    Assessment and plan  1  Paroxysmal atrial fibrillation  2  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status post pacer  3  Acute on chronic diastolic congestive heart failure  4  Coronary disease status post left main PCI history of CABG  5  Hypertension  6  Hyperlipidemia    Recommendations:  From a volume standpoint she looks well she was transition to oral Bumex today will observe her response to daily oral diuretic dosing  She needs evaluation by physical therapy to decide whether she is going to go home or will need skilled rehab  Electrophysiology is following and has been managing the atrial fibrillation  She is continuing with amiodarone loading  Continue anticoagulant  Subjective:    No significant events overnight  Patient states that she feels well and is eager to go home  Telemetry shows atrial and ventricular pacing  Denies any chest pain or dyspnea  She has not been out of bed to walk much at all  Somewhat deconditioned  ROS    Objective:   Vitals: Blood pressure 124/54, pulse 71, temperature 98 3 °F (36 8 °C), temperature source Oral, resp   rate 18, weight 60 2 kg (132 lb 11 5 oz), SpO2 97 %, not currently breastfeeding , Body mass index is 27 74 kg/m² ,   Orthostatic Blood Pressures      Most Recent Value   Blood Pressure  124/54 filed at 02/08/2021 0617   Patient Position - Orthostatic VS  Sitting filed at 02/08/2021 4723         Systolic (81SVX), TCC:033 , Min:109 , DFO:186     Diastolic (53DPL), ESC:09, Min:44, Max:89      Intake/Output Summary (Last 24 hours) at 2/8/2021 0945  Last data filed at 2/8/2021 0616  Gross per 24 hour   Intake 802 ml   Output 1600 ml   Net -798 ml     Weight (last 2 days)     Date/Time   Weight    02/08/21 0520   60 2 (132 72)    02/07/21 0529   62 4 (137 5)    02/06/21 0536   65 (143 3)                Telemetry Review: No significant arrhythmias seen on telemetry review  EKG personally reviewed by Anabella Kwok DO  Physical Exam  Vitals signs and nursing note reviewed  Constitutional:       General: She is not in acute distress  Appearance: She is well-developed  HENT:      Head: Normocephalic and atraumatic  Eyes:      Conjunctiva/sclera: Conjunctivae normal       Pupils: Pupils are equal, round, and reactive to light  Neck:      Musculoskeletal: Neck supple  Cardiovascular:      Rate and Rhythm: Normal rate and regular rhythm  Heart sounds: Normal heart sounds  No murmur  No friction rub  Pulmonary:      Effort: Pulmonary effort is normal  No respiratory distress  Breath sounds: Normal breath sounds  No wheezing or rales  Abdominal:      General: Bowel sounds are normal  There is no distension  Palpations: Abdomen is soft  Tenderness: There is no abdominal tenderness  There is no rebound  Musculoskeletal: Normal range of motion  General: No tenderness or deformity  Right lower leg: Edema present  Left lower leg: Edema present  Skin:     General: Skin is warm and dry  Findings: No erythema  Neurological:      Mental Status: She is alert and oriented to person, place, and time  Cranial Nerves: No cranial nerve deficit             Laboratory Results:  Results from last 7 days   Lab Units 02/03/21  0613 02/02/21 2011 02/02/21 1652   TROPONIN I ng/mL 0 06* 0 07* 0 06*       CBC with diff:   Results from last 7 days   Lab Units 02/06/21  0506 02/04/21  0228 02/02/21  1652 02/02/21  0457   WBC Thousand/uL 7 38 8 38 7 88 7 25   HEMOGLOBIN g/dL 9 4* 13 2 10 9* 10 4*   HEMATOCRIT % 29 5* 42 4 34 0* 33 4*   MCV fL 96 98 95 96   PLATELETS Thousands/uL 185 245 229 236   MCH pg 30 4 30 5 30 4 29 9   MCHC g/dL 31 9 31 1* 32 1 31 1*   RDW % 15 2* 15 0 14 8 14 9   MPV fL 10 1 10 1 9 7 9 7   NRBC AUTO /100 WBCs  --   --  0 0         CMP:  Results from last 7 days   Lab Units 02/08/21  5221 21  0529 21  0506 21  1224 21  0547 21  0209 21  1652   POTASSIUM mmol/L 3 8 3 3* 3 8 3 1* 3 5  3 5 5 2 3 8   CHLORIDE mmol/L 99* 97* 98* 96* 102  101 98* 103   CO2 mmol/L 34* 36* 30 32 30  30 27 26   BUN mg/dL 7 5 5 6 7  7 8 9   CREATININE mg/dL 0 72 0 66 0 60 0 71 0 62  0 58* 0 82 0 59*   CALCIUM mg/dL 8 8 8 3 8 1* 8 2* 8 5  8 4 9 0 8 6   EGFR ml/min/1 73sq m 79 83 86 80 85  87 67 86         BMP:  Results from last 7 days   Lab Units 21  0441 21  0529 21  0506 21  1224 21  0547 21  0209 21  1652   POTASSIUM mmol/L 3 8 3 3* 3 8 3 1* 3 5  3 5 5 2 3 8   CHLORIDE mmol/L 99* 97* 98* 96* 102  101 98* 103   CO2 mmol/L 34* 36* 30 32 30  30 27 26   BUN mg/dL 7 5 5 6 7  7 8 9   CREATININE mg/dL 0 72 0 66 0 60 0 71 0 62  0 58* 0 82 0 59*   CALCIUM mg/dL 8 8 8 3 8 1* 8 2* 8 5  8 4 9 0 8 6       BNP: No results for input(s): BNP in the last 72 hours      Magnesium:   Results from last 7 days   Lab Units 21  0441 21  0506 21  1224 21  0547 21  0209 21  1652 21  0457   MAGNESIUM mg/dL 1 6 2 0 1 6 1 6  1 6 2 0 3 6* 1 8       Coags:       TSH:        Hemoglobin A1C       Lipid Profile:       Cardiac testing:   Results for orders placed during the hospital encounter of 20   Echo complete with contrast if indicated    Tim Walls [address]  [phone]    Transthoracic Echocardiogram  2D, M-mode, Doppler, and Color Doppler    Study date:  03-Dec-2020    Patient: Saundra Osuna  MR number: [id number]  Account number: [de-identified]  : 1940  Age: 79 years  Gender: Female  Status: Inpatient  Location: Bedside  Height: 60 in  Weight: 136 lb  BP: 99/ 53 mmHg    Indications: Heart Failure    Diagnoses: I50 9 - Heart failure, unspecified    Sonographer:  Ashley Aguila RDCS  Primary Physician:  Yolonda Cheadle, MD  Referring Physician:  Estrellita Obrien DO  Group:  [facility]  Interpreting Physician:  Beronica Olson MD    SUMMARY    LEFT VENTRICLE:  Systolic function was mildly reduced  Ejection fraction was estimated to be 45 %  There was mild diffuse hypokinesis  Wall thickness was mildly to moderately increased  There was mild concentric hypertrophy  Doppler parameters were consistent with abnormal left ventricular relaxation (grade 1 diastolic dysfunction)  VENTRICULAR SEPTUM:  There was paradoxical motion  These changes are consistent with LBBB  LEFT ATRIUM:  The atrium was mildly dilated  MITRAL VALVE:  There was mild to moderate regurgitation  TRICUSPID VALVE:  There was mild regurgitation  Estimated peak PA pressure was 55 mmHg  The findings suggest moderate pulmonary hypertension  PULMONIC VALVE:  There was trace regurgitation  HISTORY: PRIOR HISTORY: CAD, Hypertension, GERD, Hyperlipidemia, CP, Stent, CABG, Raynaud's    PROCEDURE: The procedure was performed at the bedside  This was a routine study  The transthoracic approach was used  The study included complete 2D imaging, M-mode, complete spectral Doppler, and color Doppler  The heart rate was 82 bpm,  at the start of the study  Images were obtained from the parasternal, apical, subcostal, and suprasternal notch acoustic windows  Intravenous contrast ( 0 6ml Definity in NSS) was administered to opacify the left ventricle  Echocardiographic views were limited due to decreased penetration and lung interference  This was a technically difficult study  LEFT VENTRICLE: Size was normal  Systolic function was mildly reduced  Ejection fraction was estimated to be 45 %  There was mild diffuse hypokinesis  Wall thickness was mildly to moderately increased  There was mild concentric  hypertrophy  DOPPLER: Doppler parameters were consistent with abnormal left ventricular relaxation (grade 1 diastolic dysfunction)      VENTRICULAR SEPTUM: There was paradoxical motion  These changes are consistent with LBBB  RIGHT VENTRICLE: The size was normal  Systolic function was normal  Wall thickness was normal     LEFT ATRIUM: The atrium was mildly dilated  RIGHT ATRIUM: Size was normal     MITRAL VALVE: Valve structure was normal  There was normal leaflet separation  DOPPLER: The transmitral velocity was within the normal range  There was no evidence for stenosis  There was mild to moderate regurgitation  AORTIC VALVE: The valve was trileaflet  Leaflets exhibited normal thickness and normal cuspal separation  DOPPLER: Transaortic velocity was within the normal range  There was no evidence for stenosis  There was no regurgitation  TRICUSPID VALVE: The valve structure was normal  There was normal leaflet separation  DOPPLER: The transtricuspid velocity was within the normal range  There was no evidence for stenosis  There was mild regurgitation  Estimated peak PA  pressure was 55 mmHg  The findings suggest moderate pulmonary hypertension  PULMONIC VALVE: Leaflets exhibited normal thickness, no calcification, and normal cuspal separation  DOPPLER: The transpulmonic velocity was within the normal range  There was trace regurgitation  PERICARDIUM: There was no pericardial effusion  The pericardium was normal in appearance  AORTA: The root exhibited normal size      SYSTEM MEASUREMENT TABLES    2D  %FS: 17 94 %  Ao Diam: 2 72 cm  Ao asc: 2 46 cm  EDV(Teich): 55 79 ml  EF(Teich): 38 05 %  ESV(Teich): 34 57 ml  IVSd: 1 08 cm  LA Area: 16 14 cm2  LA Diam: 3 04 cm  LVEDV MOD A4C: 52 91 ml  LVEF MOD A4C: 38 16 %  LVESV MOD A4C: 32 72 ml  LVIDd: 3 64 cm  LVIDs: 2 98 cm  LVLd A4C: 6 09 cm  LVLs A4C: 5 64 cm  LVPWd: 0 93 cm  RA Area: 7 24 cm2  RVIDd: 2 34 cm  SV MOD A4C: 20 19 ml  SV(Teich): 21 23 ml    CW  TR Vmax: 3 73 m/s  TR maxP 52 mmHg    MM  TAPSE: 2 12 cm    PW  E' Sept: 0 04 m/s  E/E' Sept: 21 24  MV A Nicola: 1 42 m/s  MV Dec Mercer: 4 42 m/s2  MV DecT: 191 8 ms  MV E Nicola: 0 85 m/s  MV E/A Ratio: 0 6  MV PHT: 55 62 ms  MVA By PHT: 3 96 cm2    [facility] Accredited [facility]chocardi[facility]    Prepared and electronically signed by    Alfonzo Ochoa MD  Signed 03-Dec-2020 13:58:50       No results found for this or any previous visit  No results found for this or any previous visit  No results found for this or any previous visit  Meds/Allergies   all current active meds have been reviewed  Medications Prior to Admission   Medication    amLODIPine (NORVASC) 10 mg tablet    apixaban (ELIQUIS) 5 mg    aspirin (ECOTRIN LOW STRENGTH) 81 mg EC tablet    atorvastatin (LIPITOR) 20 mg tablet    docusate sodium (Colace) 100 mg capsule    furosemide (LASIX) 40 mg tablet    isosorbide mononitrate (IMDUR) 30 mg 24 hr tablet    metoprolol succinate (TOPROL-XL) 50 mg 24 hr tablet    multivitamin (THERAGRAN) TABS    nitroglycerin (NITROSTAT) 0 4 mg SL tablet    pantoprazole (PROTONIX) 40 mg tablet    polyethylene glycol (MIRALAX) 17 g packet    prasugrel (EFFIENT) tablet    ranolazine (RANEXA) 1000 MG SR tablet    rivaroxaban (XARELTO) 20 mg tablet    senna (SENOKOT) 8 6 mg          Assessment:  Principal Problem:    A-fib (HCC)  Active Problems:    Essential hypertension    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

## 2021-02-08 NOTE — PLAN OF CARE
Problem: Prexisting or High Potential for Compromised Skin Integrity  Goal: Skin integrity is maintained or improved  Description: INTERVENTIONS:  - Identify patients at risk for skin breakdown  - Assess and monitor skin integrity  - Assess and monitor nutrition and hydration status  - Monitor labs   - Assess for incontinence   - Turn and reposition patient  - Assist with mobility/ambulation  - Relieve pressure over bony prominences  - Avoid friction and shearing  - Provide appropriate hygiene as needed including keeping skin clean and dry  - Evaluate need for skin moisturizer/barrier cream  - Collaborate with interdisciplinary team   - Patient/family teaching  - Consider wound care consult   Outcome: Progressing     Problem: Neurological Deficit  Goal: Neurological status is stable or improving  Description: Interventions:  - Monitor and assess patient's level of consciousness, motor function, sensory function, and level of assistance needed for ADLs  - Monitor and report changes from baseline  Collaborate with interdisciplinary team to initiate plan and implement interventions as ordered  - Provide and maintain a safe environment  - Consider seizure precautions  - Consider fall precautions  - Consider aspiration precautions  - Consider bleeding precautions  Outcome: Progressing     Problem: Activity Intolerance/Impaired Mobility  Goal: Mobility/activity is maintained at optimum level for patient  Description: Interventions:  - Assess and monitor patient  barriers to mobility and need for assistive/adaptive devices  - Assess patient's emotional response to limitations  - Collaborate with interdisciplinary team and initiate plans and interventions as ordered  - Encourage independent activity per ability   - Maintain proper body alignment  - Perform active/passive rom as tolerated/ordered    - Plan activities to conserve energy   - Turn patient as appropriate  Outcome: Progressing     Problem: Communication Impairment  Goal: Ability to express needs and understand communication  Description: Assess patient's communication skills and ability to understand information  Patient will demonstrate use of effective communication techniques, alternative methods of communication and understanding even if not able to speak  - Encourage communication and provide alternate methods of communication as needed  - Collaborate with case management/ for discharge needs  - Include patient/family/caregiver in decisions related to communication  Outcome: Progressing     Problem: Potential for Aspiration  Goal: Non-ventilated patient's risk of aspiration is minimized  Description: Assess and monitor vital signs, respiratory status, and labs (WBC)  Monitor for signs of aspiration (tachypnea, cough, rales, wheezing, cyanosis, fever)  - Assess and monitor patient's ability to swallow  - Place patient up in chair to eat if possible  - HOB up at 90 degrees to eat if unable to get patient up into chair   - Supervise patient during oral intake  - Instruct patient/ family to take small bites  - Instruct patient/ family to take small single sips when taking liquids  - Follow patient-specific strategies generated by speech pathologist   Outcome: Progressing     Problem: Nutrition  Goal: Nutrition/Hydration status is improving  Description: Monitor and assess patient's nutrition/hydration status for malnutrition (ex- brittle hair, bruises, dry skin, pale skin and conjunctiva, muscle wasting, smooth red tongue, and disorientation)  Collaborate with interdisciplinary team and initiate plan and interventions as ordered  Monitor patient's weight and dietary intake as ordered or per policy  Utilize nutrition screening tool and intervene per policy  Determine patient's food preferences and provide high-protein, high-caloric foods as appropriate       - Assist patient with eating   - Allow adequate time for meals   - Encourage patient to take dietary supplement as ordered  - Collaborate with clinical nutritionist   - Include patient/family/caregiver in decisions related to nutrition  Outcome: Progressing     Problem: PAIN - ADULT  Goal: Verbalizes/displays adequate comfort level or baseline comfort level  Description: Interventions:  - Encourage patient to monitor pain and request assistance  - Assess pain using appropriate pain scale  - Administer analgesics based on type and severity of pain and evaluate response  - Implement non-pharmacological measures as appropriate and evaluate response  - Consider cultural and social influences on pain and pain management  - Notify physician/advanced practitioner if interventions unsuccessful or patient reports new pain  Outcome: Progressing     Problem: INFECTION - ADULT  Goal: Absence or prevention of progression during hospitalization  Description: INTERVENTIONS:  - Assess and monitor for signs and symptoms of infection  - Monitor lab/diagnostic results  - Monitor all insertion sites, i e  indwelling lines, tubes, and drains  - Monitor endotracheal if appropriate and nasal secretions for changes in amount and color  - [place] appropriate cooling/warming therapies per order  - Administer medications as ordered  - Instruct and encourage patient and family to use good hand hygiene technique  - Identify and instruct in appropriate isolation precautions for identified infection/condition  Outcome: Progressing  Goal: Absence of fever/infection during neutropenic period  Description: INTERVENTIONS:  - Monitor WBC    Outcome: Progressing     Problem: SAFETY ADULT  Goal: Patient will remain free of falls  Description: INTERVENTIONS:  - Assess patient frequently for physical needs  -  Identify cognitive and physical deficits and behaviors that affect risk of falls    -  [place] fall precautions as indicated by assessment   - Educate patient/family on patient safety including physical limitations  - Instruct patient to call for assistance with activity based on assessment  - Modify environment to reduce risk of injury  - Consider OT/PT consult to assist with strengthening/mobility  Outcome: Progressing  Goal: Maintain or return to baseline ADL function  Description: INTERVENTIONS:  -  Assess patient's ability to carry out ADLs; assess patient's baseline for ADL function and identify physical deficits which impact ability to perform ADLs (bathing, care of mouth/teeth, toileting, grooming, dressing, etc )  - Assess/evaluate cause of self-care deficits   - Assess range of motion  - Assess patient's mobility; develop plan if impaired  - Assess patient's need for assistive devices and provide as appropriate  - Encourage maximum independence but intervene and supervise when necessary  - Involve family in performance of ADLs  - Assess for home care needs following discharge   - Consider OT consult to assist with ADL evaluation and planning for discharge  - Provide patient education as appropriate  Outcome: Progressing  Goal: Maintain or return mobility status to optimal level  Description: INTERVENTIONS:  - Assess patient's baseline mobility status (ambulation, transfers, stairs, etc )    - Identify cognitive and physical deficits and behaviors that affect mobility  - Identify mobility aids required to assist with transfers and/or ambulation (gait belt, sit-to-stand, lift, walker, cane, etc )  - [place] fall precautions as indicated by assessment  - Record patient progress and toleration of activity level on Mobility SBAR; progress patient to next Phase/Stage  - Instruct patient to call for assistance with activity based on assessment  - Consider rehabilitation consult to assist with strengthening/weightbearing, etc   Outcome: Progressing     Problem: DISCHARGE PLANNING  Goal: Discharge to home or other facility with appropriate resources  Description: INTERVENTIONS:  - Identify barriers to discharge w/patient and caregiver  - Arrange for needed discharge resources and transportation as appropriate  - Identify discharge learning needs (meds, wound care, etc )  - Arrange for interpretive services to assist at discharge as needed  - Refer to Case Management Department for coordinating discharge planning if the patient needs post-hospital services based on physician/advanced practitioner order or complex needs related to functional status, cognitive ability, or social support system  Outcome: Progressing     Problem: Knowledge Deficit  Goal: Patient/family/caregiver demonstrates understanding of disease process, treatment plan, medications, and discharge instructions  Description: Complete learning assessment and assess knowledge base  Interventions:  - Provide teaching at level of understanding  - Provide teaching via preferred learning methods  Outcome: Progressing     Problem: Potential for Falls  Goal: Patient will remain free of falls  Description: INTERVENTIONS:  - Assess patient frequently for physical needs  -  Identify cognitive and physical deficits and behaviors that affect risk of falls  -  [place] fall precautions as indicated by assessment   - Educate patient/family on patient safety including physical limitations  - Instruct patient to call for assistance with activity based on assessment  - Modify environment to reduce risk of injury  - Consider OT/PT consult to assist with strengthening/mobility  Outcome: Progressing     Problem: Nutrition/Hydration-ADULT  Goal: Nutrient/Hydration intake appropriate for improving, restoring or maintaining nutritional needs  Description: Monitor and assess patient's nutrition/hydration status for malnutrition  Collaborate with interdisciplinary team and initiate plan and interventions as ordered  Monitor patient's weight and dietary intake as ordered or per policy  Utilize nutrition screening tool and intervene as necessary   Determine patient's food preferences and provide high-protein, high-caloric foods as appropriate       INTERVENTIONS:  - Monitor oral intake, urinary output, labs, and treatment plans  - Assess nutrition and hydration status and recommend course of action  - Evaluate amount of meals eaten  - Assist patient with eating if necessary   - Allow adequate time for meals  - Recommend/ encourage appropriate diets, oral nutritional supplements, and vitamin/mineral supplements  - Order, calculate, and assess calorie counts as needed  - Recommend, monitor, and adjust tube feedings and TPN/PPN based on assessed needs  - Assess need for intravenous fluids  - Provide specific nutrition/hydration education as appropriate  - Include patient/family/caregiver in decisions related to nutrition  Outcome: Progressing     Problem: CARDIOVASCULAR - ADULT  Goal: Maintains optimal cardiac output and hemodynamic stability  Description: INTERVENTIONS:  - Monitor I/O, vital signs and rhythm  - Monitor for S/S and trends of decreased cardiac output  - Administer and titrate ordered vasoactive medications to optimize hemodynamic stability  - Assess quality of pulses, skin color and temperature  - Assess for signs of decreased coronary artery perfusion  - Instruct patient to report change in severity of symptoms  Outcome: Progressing  Goal: Absence of cardiac dysrhythmias or at baseline rhythm  Description: INTERVENTIONS:  - Continuous cardiac monitoring, vital signs, obtain 12 lead EKG if ordered  - Administer antiarrhythmic and heart rate control medications as ordered  - Monitor electrolytes and administer replacement therapy as ordered  Outcome: Progressing     Problem: RESPIRATORY - ADULT  Goal: Achieves optimal ventilation and oxygenation  Description: INTERVENTIONS:  - Assess for changes in respiratory status  - Assess for changes in mentation and behavior  - Position to facilitate oxygenation and minimize respiratory effort  - Oxygen administered by appropriate delivery if ordered  - Initiate smoking cessation education as indicated  - Encourage broncho-pulmonary hygiene including cough, deep breathe, Incentive Spirometry  - Assess the need for suctioning and aspirate as needed  - Assess and instruct to report SOB or any respiratory difficulty  - Respiratory Therapy support as indicated  Outcome: Progressing

## 2021-02-08 NOTE — PROGRESS NOTES
Progress Note - Marko Rosas 1940, 79 y o  female MRN: [id number]    Unit/Bed#: [facility] 531-01 Encounter: [id number]    Primary Care Provider: Latoya Barrios MD   Date and time admitted to hospital: 1/29/2021  3:37 PM        * A-fib [facility])  Assessment & Plan  · Remains in SR, rate controlled, off amiodarone gtt on PO  · EP/cardiology on board  · Cont metoprolol    · A/c with xarelto  · Cont telemetry monitoring      Seizure-like activity ([facility] )  Assessment & Plan  · Rapid response initiated while pt was at [facility] d/t seizure like activity  · S/p Neurology consult  · S/p EEG  · Not sure if able to have MRI as positive PPM; s/p CT head  · On keppra BID  · Sz precautions  · PENNDOT filed per neurology  · No recurring sz like activity      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[facility] )  Assessment & Plan  Wt Readings from Last 3 Encounters:   02/08/21 60 2 kg (132 lb 11 5 oz)   01/27/21 70 kg (154 lb 4 8 oz)   01/21/21 63 7 kg (140 lb 6 4 oz)   Acute on chronic systolic/diastolic HF, ef of 87%; possibly precipitated by afib with rvr  Cardiology on board, transitioned to po starting today, recommends to monitor overnight for response   Cont I/O, daily wts  Good diuretic response        CAD (coronary artery disease)  Assessment & Plan  S/p cardiac cath/PCI/LAMINE x 2 1/11/2021  On effient, per cards on past admission d/c of asa given on xarelto as well  Pt is CP free    Urinary retention  Assessment & Plan  Voiding trial today with urinary retention protocol in place  Thus far has been voiding   Pt c/o pyuria but unsure if may have been related to positioning dominguez catheter as once adjusted symptoms resolved      Regardless + pyuria, + VRE in urine cx dated 1/22; sens to tetracycline, changed abx to doxycycline x 7 days  No systemic illness to suggest acute infxn    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[facility] )  Assessment & Plan  · + Pacemaker in place, 1/19/2021    Dysphagia  Assessment & Plan  · Resolved; s/p speech/GI eval 2/2; cleared for upgrade of diet to regular     Hyponatremia  Assessment & Plan  Secondary to volume overload,   Resolved    Hyperlipidemia  Assessment & Plan  · Continue statin    Essential hypertension  Assessment & Plan  · Stable      VTE Pharmacologic Prophylaxis:   Pharmacologic: Rivaroxaban (Xarelto)  Mechanical VTE Prophylaxis in Place: Yes    Patient Centered Rounds: I have performed bedside rounds with nursing staff today  Discussions with Specialists or Other Care Team Provider:     Education and Discussions with Family / Patient: Patient; and also called her dtr [address] updated at length    Time Spent for Care: 30 minutes  More than 50% of total time spent on counseling and coordination of care as described above  Current Length of Stay: 10 day(s)    Current Patient Status: Inpatient   Certification Statement: The patient will continue to require additional inpatient hospital stay due to PAF, adhf    Discharge Plan: D/c planning in 1-2 days  Unfortunately patient was not accepted back to acute rehab  She is insistent on wishing to go home the family is extremely reluctant though  We will have Physical therapy re-evaluate and family hopes with their recommendations she may change her mind  Code Status: Level 3 - DNAR and DNI      Subjective:   Sitting in chair  He remains a sinus rhythm, rate control  Denies dyspnea  Lower extremity edema much improved  No acute events overnight  Has since had Saeed removed  Denies any  symptoms  Talked to her at length about consideration for rehab but she is insistent on going home instead    Objective:     Vitals:   Temp (24hrs), Av 4 °F (36 9 °C), Min:98 1 °F (36 7 °C), Max:98 9 °F (37 2 °C)    Temp:  [98 1 °F (36 7 °C)-98 9 °F (37 2 °C)] 98 1 °F (36 7 °C)  HR:  [71-95] 71  Resp:  [18-22] 22  BP: (109-142)/(44-89) 109/54  SpO2:  [96 %-98 %] 98 %  Body mass index is 27 74 kg/m²       Input and Output Summary (last 24 hours): Intake/Output Summary (Last 24 hours) at 2/8/2021 1855  Last data filed at 2/8/2021 0616  Gross per 24 hour   Intake 400 ml   Output 750 ml   Net -350 ml       Physical Exam:     Physical Exam  Constitutional:       Appearance: Normal appearance  Neck:      Musculoskeletal: Normal range of motion and neck supple  Cardiovascular:      Rate and Rhythm: Normal rate and regular rhythm  Pulses: Normal pulses  Heart sounds: Normal heart sounds  No murmur  No gallop  Pulmonary:      Effort: Pulmonary effort is normal  No respiratory distress  Breath sounds: Normal breath sounds  No wheezing or rales  Abdominal:      General: Abdomen is flat  Bowel sounds are normal  There is no distension  Palpations: Abdomen is soft  Tenderness: There is no abdominal tenderness  There is no guarding  Musculoskeletal:      Comments: Trace edema   Skin:     General: Skin is warm and dry  Neurological:      General: No focal deficit present  Mental Status: She is alert and oriented to person, place, and time  Mental status is at baseline  Additional Data:     Labs:    Results from last 7 days   Lab Units 02/06/21  0506  02/02/21  1652   WBC Thousand/uL 7 38   < > 7 88   HEMOGLOBIN g/dL 9 4*   < > 10 9*   HEMATOCRIT % 29 5*   < > 34 0*   PLATELETS Thousands/uL 185   < > 229   NEUTROS PCT %  --   --  43   LYMPHS PCT %  --   --  45*   MONOS PCT %  --   --  8   EOS PCT %  --   --  2    < > = values in this interval not displayed       Results from last 7 days   Lab Units 02/08/21  0441   SODIUM mmol/L 137   POTASSIUM mmol/L 3 8   CHLORIDE mmol/L 99*   CO2 mmol/L 34*   BUN mg/dL 7   CREATININE mg/dL 0 72   ANION GAP mmol/L 4   CALCIUM mg/dL 8 8   GLUCOSE RANDOM mg/dL 104         Results from last 7 days   Lab Units 02/06/21  1048 02/06/21  2037 02/05/21  2122 02/05/21  1602 02/05/21  1104 02/05/21  0739 02/04/21  1525 02/04/21  1031 02/04/21  0626 02/03/21  2119 02/03/21  1510 02/03/21  1122 POC GLUCOSE mg/dl 114 119 147* 141* 120 121 119 117 108 96 129 163*               * I Have Reviewed All Lab Data Listed Above  * Additional Pertinent Lab Tests Reviewed: All Labs Within Last 24 Hours Reviewed    Imaging:    Imaging Reports Reviewed Today Include:   Imaging Personally Reviewed by Myself Includes:      Recent Cultures (last 7 days):           Last 24 Hours Medication List:   Current Facility-Administered Medications   Medication Dose Route Frequency Provider Last Rate    acetaminophen  650 mg Oral Q6H PRN Norman Flood PA-C      amiodarone  200 mg Oral TID With Meals Anthony Quintero PA-C      atorvastatin  20 mg Oral Daily With [employer]      bumetanide  2 mg Oral Daily Renee Cotto MD      doxycycline hyclate  100 mg Oral Q12H [facility] Beatriz Vidal DO      hydrALAZINE  5 mg Intravenous Q6H PRN Norman Flood PA-C      levETIRAcetam  500 mg Oral Q12H [facility] Norman Flood PA-C      lidocaine (PF)  5 mL Infiltration Once Norman Flood PA-C      magnesium oxide  400 mg Oral BID before lunch/dinner Beatriz Vidal DO      melatonin  3 mg Oral HS Mary Medina PA-C      metoprolol succinate  25 mg Oral Daily Anthony Quintero PA-C      ondansetron  4 mg Intravenous Q6H PRN Norman Flood PA-C      oxyCODONE  2 5 mg Oral Q4H PRN Norman Flood PA-C      pantoprazole  40 mg Oral Early Morning Rawleigh Kaylah, CRNP      potassium chloride  40 mEq Oral Daily Beatriz Vidal DO      prasugrel  10 mg Oral Daily Norman Flood PA-C      psyllium  1 packet Oral Daily Norman Flood PA-C      rivaroxaban  15 mg Oral Daily With [employer]      saccharomyces boulardii  250 mg Oral BID Norman Flood PA-C          Today, Patient Was Seen By: Beatriz Vidal DO    ** Please Note: Dictation voice to text software may have been used in the creation of this document   **

## 2021-02-08 NOTE — ASSESSMENT & PLAN NOTE
· Remains in SR, rate controlled, off amiodarone gtt on PO  · EP/cardiology on board  · Cont metoprolol    · A/c with xarelto  · Cont telemetry monitoring

## 2021-02-08 NOTE — ASSESSMENT & PLAN NOTE
Wt Readings from Last 3 Encounters:   02/08/21 60 2 kg (132 lb 11 5 oz)   01/27/21 70 kg (154 lb 4 8 oz)   01/21/21 63 7 kg (140 lb 6 4 oz)   Acute on chronic systolic/diastolic HF, ef of 92%; possibly precipitated by afib with rvr  Cardiology on board, transitioned to po starting today, recommends to monitor overnight for response   Cont I/O, daily wts  Good diuretic response

## 2021-02-08 NOTE — ASSESSMENT & PLAN NOTE
· Rapid response initiated while pt was at [facility] d/t seizure like activity  · S/p Neurology consult  · S/p EEG  · Not sure if able to have MRI as positive PPM; s/p CT head  · On keppra BID  · Sz precautions  · PENNDOT filed per neurology  · No recurring sz like activity

## 2021-02-08 NOTE — ASSESSMENT & PLAN NOTE
Voiding trial today with urinary retention protocol in place  Thus far has been voiding   Pt c/o pyuria but unsure if may have been related to positioning dominguez catheter as once adjusted symptoms resolved      Regardless + pyuria, + VRE in urine cx dated 1/22; sens to tetracycline, changed abx to doxycycline x 7 days  No systemic illness to suggest acute infxn

## 2021-02-09 PROCEDURE — 99232 SBSQ HOSP IP/OBS MODERATE 35: CPT | Performed by: INTERNAL MEDICINE

## 2021-02-09 PROCEDURE — 99232 SBSQ HOSP IP/OBS MODERATE 35: CPT | Performed by: FAMILY MEDICINE

## 2021-02-09 RX ORDER — AMIODARONE HYDROCHLORIDE 200 MG/1
200 TABLET ORAL
Status: DISCONTINUED | OUTPATIENT
Start: 2021-02-10 | End: 2021-02-13 | Stop reason: HOSPADM

## 2021-02-09 RX ADMIN — PANTOPRAZOLE SODIUM 40 MG: 40 TABLET, DELAYED RELEASE ORAL at 05:40

## 2021-02-09 RX ADMIN — DOXYCYCLINE 100 MG: 100 CAPSULE ORAL at 20:40

## 2021-02-09 RX ADMIN — DOXYCYCLINE 100 MG: 100 CAPSULE ORAL at 09:22

## 2021-02-09 RX ADMIN — LEVETIRACETAM 500 MG: 500 TABLET, FILM COATED ORAL at 09:22

## 2021-02-09 RX ADMIN — PRASUGREL HYDROCHLORIDE 10 MG: 10 TABLET, FILM COATED ORAL at 09:23

## 2021-02-09 RX ADMIN — POTASSIUM CHLORIDE 40 MEQ: 1500 TABLET, EXTENDED RELEASE ORAL at 09:22

## 2021-02-09 RX ADMIN — METOPROLOL SUCCINATE 25 MG: 25 TABLET, EXTENDED RELEASE ORAL at 09:22

## 2021-02-09 RX ADMIN — OXYCODONE HYDROCHLORIDE 2.5 MG: 5 TABLET ORAL at 23:50

## 2021-02-09 RX ADMIN — Medication 250 MG: at 09:23

## 2021-02-09 RX ADMIN — MAGNESIUM OXIDE TAB 400 MG (241.3 MG ELEMENTAL MG) 400 MG: 400 (241.3 MG) TAB at 16:18

## 2021-02-09 RX ADMIN — ATORVASTATIN CALCIUM 20 MG: 20 TABLET, FILM COATED ORAL at 16:18

## 2021-02-09 RX ADMIN — RIVAROXABAN 15 MG: 15 TABLET, FILM COATED ORAL at 16:18

## 2021-02-09 RX ADMIN — BUMETANIDE 2 MG: 2 TABLET ORAL at 09:22

## 2021-02-09 RX ADMIN — Medication 1 PACKET: at 09:23

## 2021-02-09 RX ADMIN — AMIODARONE HYDROCHLORIDE 200 MG: 200 TABLET ORAL at 09:24

## 2021-02-09 RX ADMIN — LEVETIRACETAM 500 MG: 500 TABLET, FILM COATED ORAL at 20:40

## 2021-02-09 RX ADMIN — ACETAMINOPHEN 650 MG: 325 TABLET, FILM COATED ORAL at 22:23

## 2021-02-09 RX ADMIN — Medication 250 MG: at 17:44

## 2021-02-09 RX ADMIN — MAGNESIUM OXIDE TAB 400 MG (241.3 MG ELEMENTAL MG) 400 MG: 400 (241.3 MG) TAB at 12:17

## 2021-02-09 NOTE — PROGRESS NOTES
Progress Note - Electrophysiology  Toni Brain 79 y o  female MRN: [id number]  Unit/Bed#: [facility] 531-01 Encounter: [id number]      Assessment:  1  Symptomatic newly diagnosed atrial fibrillation, paroxysmal   - anticoagulated with Xarelto 15mg daily (see below #7) / [id number] score of 6 (age, sex, CAD, CHF, HTN)  - rate control: metoprolol succinate 50mg daily as an outpatient  - antiarrhythmic therapy: amiodarone  - prior cardioversion/ablation: none  2  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- seen to have a 4 second conversion pause  - status post [employer] dual-chamber pacemaker with left posterior fascicle lead by Dr Shubham Charels on 1/19/2021  3  Left bundle-branch block at baseline  4  Moderate pulmonary hypertension  5  Essential hypertension  - maintained on amlodipine 10mg daily and metoprolol   6  HFmEF  - diuretic regimen of furosemide 40mg daily  - EF had been 55% per echo 5/2019 / now down to 45% per echo 12/3/2020   7  History of CAD  - prior CABG (LIMA to LAD, SVG to D1, SVG to OM1, SVG to RCA) 2011 / revascularization of LAD in diagonal branch with PCI in late 2011  - now status post LAMINE to proximal LAD and ostial left main on 1/11/2021  - maintained on statin, beta blocker, and Effient / also on Ranexa and Imdur  - patient being maintained on Effient and Xarelto 15 mg daily with cessation of aspirin per attending recommendations  8  Hyperlipidemia  9  Bilateral renal artery stenosis  10  Diarrhea, resolved      Plan:  Patient is currently maintaining sinus rhythm while being maintained on amiodarone  Would continue this antiarrhythmic as an outpatient  Patient was counseled on risk of long-term toxicities with this medication  In looking through her chart, it does appear that patient has been loaded with close to 10 g  Will discuss with attending and transition her to 200 mg daily as an outpatient  Diuretics being managed by general cardiology        Subjective/Objective   Subjective:  Patient is a pleasant 79-year-old female with new onset symptomatic paroxysmal atrial fibrillation anticoagulated with low-dose Xarelto, tachy-jillian syndrome status post pacemaker implantation, left bundle-branch block at baseline, moderate pulmonary hypertension, essential hypertension heart failure with mid-range EF recently down, history of CAD with prior CABG and stents, hyperlipidemia, and bilateral renal artery stenosis      Patient has been being followed by electrophysiology after transfer [facility] early in [facility]  She has had prior CABG presented back with chest pain for which she did have drug-eluting stents placed to proximal LAD and ostial left main  In the postprocedure setting she did develop atrial fibrillation with RVR and also had significant pauses  She was therefore transferred and after resolution of diarrhea underwent dual-chamber pacemaker implantation for her tachy-jillian syndrome with short AV delay programmed to force pace  In the acute post procedure setting it was felt that her atrial fibrillation would be rate controlled  However, she did develop more episodes of RVR for which amiodarone was initiated  In looking at her telemetry, she has not had any further atrial fibrillation since Friday and she is AV pacing  She reports doing well  She would like to go home to her  of 64 years of marriage and denies any lightheadedness, dizziness, or chest pain  She does feel a little deconditioned      Objective:  Vitals: /62   Pulse 71   Temp 98 °F (36 7 °C)   Resp 20   Wt 60 2 kg (132 lb 11 5 oz)   SpO2 99%   BMI 27 74 kg/m²     Vitals:    02/07/21 0529 02/08/21 0520   Weight: 62 4 kg (137 lb 8 oz) 60 2 kg (132 lb 11 5 oz)     Orthostatic Blood Pressures      Most Recent Value   Blood Pressure  131/62 filed at 02/08/2021 1900   Patient Position - Orthostatic VS  Sitting filed at 02/08/2021 0617            Intake/Output Summary (Last 24 hours) at 2/8/2021 2307  Last data filed at 2/8/2021 1900  Gross per 24 hour   Intake 580 ml   Output 300 ml   Net 280 ml       Invasive Devices     Peripherally Inserted Central Catheter Line            PICC Line 02/04/21 Right Brachial 4 days                          Scheduled Meds:  Current Facility-Administered Medications   Medication Dose Route Frequency Provider Last Rate    acetaminophen  650 mg Oral Q6H PRN Suzan Gamma, CARLY      amiodarone  200 mg Oral TID With Meals Anthony Quintero PA-C      atorvastatin  20 mg Oral Daily With [employer], CARLY      bumetanide  2 mg Oral Daily Diamantina Scheuermann, MD      doxycycline hyclate  100 mg Oral Q12H [facility], DO      hydrALAZINE  5 mg Intravenous Q6H PRN Suzan Gamma, CARLY      levETIRAcetam  500 mg Oral Q12H [facility] Suzan Gamma, CARLY      lidocaine (PF)  5 mL Infiltration Once Suzan Gamma, CARLY      magnesium oxide  400 mg Oral BID before lunch/dinner Peg Golas, DO      melatonin  3 mg Oral HS PRN Peg Golas, DO      metoprolol succinate  25 mg Oral Daily Anthony Quintero, [place]      ondansetron  4 mg Intravenous Q6H PRN Suzan Gamma, CARLY      oxyCODONE  2 5 mg Oral Q4H PRN Suzan Gamma, CARLY      pantoprazole  40 mg Oral Early Morning Dacia Catching, CRNP      potassium chloride  40 mEq Oral Daily Peg Golas, DO      prasugrel  10 mg Oral Daily Suzan Gamma, CARLY      psyllium  1 packet Oral Daily Suzan Gamma, CARLY      rivaroxaban  15 mg Oral Daily With [employer], CARLY      saccharomyces boulardii  250 mg Oral BID Suzan Gamma, PA-KATHIE       Continuous Infusions:   PRN Meds:   acetaminophen    hydrALAZINE    melatonin    ondansetron    oxyCODONE    Review of Systems:  ROS as noted above, otherwise 12 point review of systems was performed and is negative       Physical Exam:   GEN: NAD, alert and oriented, well appearing  SKIN: dry without significant lesions or rashes  HEENT: NCAT, PERRL, EOMs intact  NECK: No JVD or carotid bruits appreciated  CARDIOVASCULAR: RRR, normal S1, S2 without murmurs, rubs, or gallops appreciated  LUNGS: Clear to auscultation bilaterally without wheezes, rhonchi, or rales  ABDOMEN: Soft, nontender, nondistended  EXTREMITIES/VASCULAR: perfused without clubbing, cyanosis, or edema b/l  PSYCH: Normal mood and affect  NEURO: CN ll-Xll grossly intact              Lab Results: I have personally reviewed pertinent lab results  Results from last 7 days   Lab Units 21  0506 21  0228 21  1652   WBC Thousand/uL 7 38 8 38 7 88   HEMOGLOBIN g/dL 9 4* 13 2 10 9*   HEMATOCRIT % 29 5* 42 4 34 0*   PLATELETS Thousands/uL 185 245 229     Results from last 7 days   Lab Units 21  0441 21  0529 21  0506   POTASSIUM mmol/L 3 8 3 3* 3 8   CHLORIDE mmol/L 99* 97* 98*   CO2 mmol/L 34* 36* 30   BUN mg/dL 7 5 5   CREATININE mg/dL 0 72 0 66 0 60   CALCIUM mg/dL 8 8 8 3 8 1*         Results from last 7 days   Lab Units 21  0441 21  0506 21  1224   MAGNESIUM mg/dL 1 6 2 0 1 6       Imaging: I have personally reviewed pertinent reports      Results for orders placed during the hospital encounter of 20   Echo complete with contrast if indicated    Narrative [address]  [phone]    Transthoracic Echocardiogram  2D, M-mode, Doppler, and Color Doppler    Study date:  03-Dec-2020    Patient: Vitor Rebolledo  MR number: [id number]  Account number: [de-identified]  : 1940  Age: 79 years  Gender: Female  Status: Inpatient  Location: Bedside  Height: 60 in  Weight: 136 lb  BP: 99/ 53 mmHg    Indications: Heart Failure    Diagnoses: I50 9 - Heart failure, unspecified    Sonographer:  Chandra Mancera RDCS  Primary Physician:  Marc Mason MD  Referring Physician:  Evon Kehr, DO  Group:  [id number]  Interpreting Physician:  Kathryn Reynaga MD    SUMMARY    LEFT VENTRICLE:  Systolic function was mildly reduced  Ejection fraction was estimated to be 45 %  There was mild diffuse hypokinesis  Wall thickness was mildly to moderately increased  There was mild concentric hypertrophy  Doppler parameters were consistent with abnormal left ventricular relaxation (grade 1 diastolic dysfunction)  VENTRICULAR SEPTUM:  There was paradoxical motion  These changes are consistent with LBBB  LEFT ATRIUM:  The atrium was mildly dilated  MITRAL VALVE:  There was mild to moderate regurgitation  TRICUSPID VALVE:  There was mild regurgitation  Estimated peak PA pressure was 55 mmHg  The findings suggest moderate pulmonary hypertension  PULMONIC VALVE:  There was trace regurgitation  HISTORY: PRIOR HISTORY: CAD, Hypertension, GERD, Hyperlipidemia, CP, Stent, CABG, Raynaud's    PROCEDURE: The procedure was performed at the bedside  This was a routine study  The transthoracic approach was used  The study included complete 2D imaging, M-mode, complete spectral Doppler, and color Doppler  The heart rate was 82 bpm,  at the start of the study  Images were obtained from the parasternal, apical, subcostal, and suprasternal notch acoustic windows  Intravenous contrast ( 0 6ml Definity in NSS) was administered to opacify the left ventricle  Echocardiographic views were limited due to decreased penetration and lung interference  This was a technically difficult study  LEFT VENTRICLE: Size was normal  Systolic function was mildly reduced  Ejection fraction was estimated to be 45 %  There was mild diffuse hypokinesis  Wall thickness was mildly to moderately increased  There was mild concentric  hypertrophy  DOPPLER: Doppler parameters were consistent with abnormal left ventricular relaxation (grade 1 diastolic dysfunction)  VENTRICULAR SEPTUM: There was paradoxical motion  These changes are consistent with LBBB      RIGHT VENTRICLE: The size was normal  Systolic function was normal  Wall thickness was normal     LEFT ATRIUM: The atrium was mildly dilated  RIGHT ATRIUM: Size was normal     MITRAL VALVE: Valve structure was normal  There was normal leaflet separation  DOPPLER: The transmitral velocity was within the normal range  There was no evidence for stenosis  There was mild to moderate regurgitation  AORTIC VALVE: The valve was trileaflet  Leaflets exhibited normal thickness and normal cuspal separation  DOPPLER: Transaortic velocity was within the normal range  There was no evidence for stenosis  There was no regurgitation  TRICUSPID VALVE: The valve structure was normal  There was normal leaflet separation  DOPPLER: The transtricuspid velocity was within the normal range  There was no evidence for stenosis  There was mild regurgitation  Estimated peak PA  pressure was 55 mmHg  The findings suggest moderate pulmonary hypertension  PULMONIC VALVE: Leaflets exhibited normal thickness, no calcification, and normal cuspal separation  DOPPLER: The transpulmonic velocity was within the normal range  There was trace regurgitation  PERICARDIUM: There was no pericardial effusion  The pericardium was normal in appearance  AORTA: The root exhibited normal size      SYSTEM MEASUREMENT TABLES    2D  %FS: 17 94 %  Ao Diam: 2 72 cm  Ao asc: 2 46 cm  EDV(Teich): 55 79 ml  EF(Teich): 38 05 %  ESV(Teich): 34 57 ml  IVSd: 1 08 cm  LA Area: 16 14 cm2  LA Diam: 3 04 cm  LVEDV MOD A4C: 52 91 ml  LVEF MOD A4C: 38 16 %  LVESV MOD A4C: 32 72 ml  LVIDd: 3 64 cm  LVIDs: 2 98 cm  LVLd A4C: 6 09 cm  LVLs A4C: 5 64 cm  LVPWd: 0 93 cm  RA Area: 7 24 cm2  RVIDd: 2 34 cm  SV MOD A4C: 20 19 ml  SV(Teich): 21 23 ml    CW  TR Vmax: 3 73 m/s  TR maxP 52 mmHg    MM  TAPSE: 2 12 cm    PW  E' Sept: 0 04 m/s  E/E' Sept: 21 24  MV A Nicola: 1 42 m/s  MV Dec [facility]: 4 42 m/s2  MV DecT: 191 8 ms  MV E Nicola: 0 85 m/s  MV E/A Ratio: 0 6  MV PHT: 55 62 ms  MVA By PHT: 3 96 cm2    [address] Accredited Echocardiography Laboratory    Prepared and electronically signed by    Maximino Bautista MD  Signed 03-Dec-2020 13:58:50         VTE Pharmacologic Prophylaxis: Sequential compression device (Venodyne)   VTE Mechanical Prophylaxis: sequential compression device

## 2021-02-09 NOTE — QUICK NOTE
This is a quick note, do not bill for the same    Telemetry reviewed, no further episodes since 5th February 2021  Patient is predominantly paced rhythm   When she is in RVR, she develops bundle branch block and widened QRS      If patient is able to move around, can consider stable from electrophysiologic point of view

## 2021-02-09 NOTE — PLAN OF CARE
Problem: Prexisting or High Potential for Compromised Skin Integrity  Goal: Skin integrity is maintained or improved  Description: INTERVENTIONS:  - Identify patients at risk for skin breakdown  - Assess and monitor skin integrity  - Assess and monitor nutrition and hydration status  - Monitor labs   - Assess for incontinence   - Turn and reposition patient  - Assist with mobility/ambulation  - Relieve pressure over bony prominences  - Avoid friction and shearing  - Provide appropriate hygiene as needed including keeping skin clean and dry  - Evaluate need for skin moisturizer/barrier cream  - Collaborate with interdisciplinary team   - Patient/family teaching  - Consider wound care consult   Outcome: Progressing     Problem: Neurological Deficit  Goal: Neurological status is stable or improving  Description: Interventions:  - Monitor and assess patient's level of consciousness, motor function, sensory function, and level of assistance needed for ADLs  - Monitor and report changes from baseline  Collaborate with interdisciplinary team to initiate plan and implement interventions as ordered  - Provide and maintain a safe environment  - Consider seizure precautions  - Consider fall precautions  - Consider aspiration precautions  - Consider bleeding precautions  Outcome: Progressing     Problem: Activity Intolerance/Impaired Mobility  Goal: Mobility/activity is maintained at optimum level for patient  Description: Interventions:  - Assess and monitor patient  barriers to mobility and need for assistive/adaptive devices  - Assess patient's emotional response to limitations  - Collaborate with interdisciplinary team and initiate plans and interventions as ordered  - Encourage independent activity per ability   - Maintain proper body alignment  - Perform active/passive rom as tolerated/ordered    - Plan activities to conserve energy   - Turn patient as appropriate  Outcome: Progressing     Problem: Communication Impairment  Goal: Ability to express needs and understand communication  Description: Assess patient's communication skills and ability to understand information  Patient will demonstrate use of effective communication techniques, alternative methods of communication and understanding even if not able to speak  - Encourage communication and provide alternate methods of communication as needed  - Collaborate with case management/ for discharge needs  - Include patient/family/caregiver in decisions related to communication  Outcome: Progressing     Problem: Potential for Aspiration  Goal: Non-ventilated patient's risk of aspiration is minimized  Description: Assess and monitor vital signs, respiratory status, and labs (WBC)  Monitor for signs of aspiration (tachypnea, cough, rales, wheezing, cyanosis, fever)  - Assess and monitor patient's ability to swallow  - Place patient up in chair to eat if possible  - HOB up at 90 degrees to eat if unable to get patient up into chair   - Supervise patient during oral intake  - Instruct patient/ family to take small bites  - Instruct patient/ family to take small single sips when taking liquids  - Follow patient-specific strategies generated by speech pathologist   Outcome: Progressing     Problem: Nutrition  Goal: Nutrition/Hydration status is improving  Description: Monitor and assess patient's nutrition/hydration status for malnutrition (ex- brittle hair, bruises, dry skin, pale skin and conjunctiva, muscle wasting, smooth red tongue, and disorientation)  Collaborate with interdisciplinary team and initiate plan and interventions as ordered  Monitor patient's weight and dietary intake as ordered or per policy  Utilize nutrition screening tool and intervene per policy  Determine patient's food preferences and provide high-protein, high-caloric foods as appropriate       - Assist patient with eating   - Allow adequate time for meals   - Encourage patient to take dietary supplement as ordered  - Collaborate with clinical nutritionist   - Include patient/family/caregiver in decisions related to nutrition  Outcome: Progressing     Problem: PAIN - ADULT  Goal: Verbalizes/displays adequate comfort level or baseline comfort level  Description: Interventions:  - Encourage patient to monitor pain and request assistance  - Assess pain using appropriate pain scale  - Administer analgesics based on type and severity of pain and evaluate response  - Implement non-pharmacological measures as appropriate and evaluate response  - Consider cultural and social influences on pain and pain management  - Notify physician/advanced practitioner if interventions unsuccessful or patient reports new pain  Outcome: Progressing     Problem: INFECTION - ADULT  Goal: Absence or prevention of progression during hospitalization  Description: INTERVENTIONS:  - Assess and monitor for signs and symptoms of infection  - Monitor lab/diagnostic results  - Monitor all insertion sites, i e  indwelling lines, tubes, and drains  - Monitor endotracheal if appropriate and nasal secretions for changes in amount and color  - [place] appropriate cooling/warming therapies per order  - Administer medications as ordered  - Instruct and encourage patient and family to use good hand hygiene technique  - Identify and instruct in appropriate isolation precautions for identified infection/condition  Outcome: Progressing  Goal: Absence of fever/infection during neutropenic period  Description: INTERVENTIONS:  - Monitor WBC    Outcome: Progressing     Problem: SAFETY ADULT  Goal: Patient will remain free of falls  Description: INTERVENTIONS:  - Assess patient frequently for physical needs  -  Identify cognitive and physical deficits and behaviors that affect risk of falls    -  [place] fall precautions as indicated by assessment   - Educate patient/family on patient safety including physical limitations  - Instruct patient to call for assistance with activity based on assessment  - Modify environment to reduce risk of injury  - Consider OT/PT consult to assist with strengthening/mobility  Outcome: Progressing  Goal: Maintain or return to baseline ADL function  Description: INTERVENTIONS:  -  Assess patient's ability to carry out ADLs; assess patient's baseline for ADL function and identify physical deficits which impact ability to perform ADLs (bathing, care of mouth/teeth, toileting, grooming, dressing, etc )  - Assess/evaluate cause of self-care deficits   - Assess range of motion  - Assess patient's mobility; develop plan if impaired  - Assess patient's need for assistive devices and provide as appropriate  - Encourage maximum independence but intervene and supervise when necessary  - Involve family in performance of ADLs  - Assess for home care needs following discharge   - Consider OT consult to assist with ADL evaluation and planning for discharge  - Provide patient education as appropriate  Outcome: Progressing  Goal: Maintain or return mobility status to optimal level  Description: INTERVENTIONS:  - Assess patient's baseline mobility status (ambulation, transfers, stairs, etc )    - Identify cognitive and physical deficits and behaviors that affect mobility  - Identify mobility aids required to assist with transfers and/or ambulation (gait belt, sit-to-stand, lift, walker, cane, etc )  - [place] fall precautions as indicated by assessment  - Record patient progress and toleration of activity level on Mobility SBAR; progress patient to next Phase/Stage  - Instruct patient to call for assistance with activity based on assessment  - Consider rehabilitation consult to assist with strengthening/weightbearing, etc   Outcome: Progressing     Problem: DISCHARGE PLANNING  Goal: Discharge to home or other facility with appropriate resources  Description: INTERVENTIONS:  - Identify barriers to discharge w/patient and caregiver  - Arrange for needed discharge resources and transportation as appropriate  - Identify discharge learning needs (meds, wound care, etc )  - Arrange for interpretive services to assist at discharge as needed  - Refer to Case Management Department for coordinating discharge planning if the patient needs post-hospital services based on physician/advanced practitioner order or complex needs related to functional status, cognitive ability, or social support system  Outcome: Progressing     Problem: Knowledge Deficit  Goal: Patient/family/caregiver demonstrates understanding of disease process, treatment plan, medications, and discharge instructions  Description: Complete learning assessment and assess knowledge base  Interventions:  - Provide teaching at level of understanding  - Provide teaching via preferred learning methods  Outcome: Progressing     Problem: Potential for Falls  Goal: Patient will remain free of falls  Description: INTERVENTIONS:  - Assess patient frequently for physical needs  -  Identify cognitive and physical deficits and behaviors that affect risk of falls  -  [place] fall precautions as indicated by assessment   - Educate patient/family on patient safety including physical limitations  - Instruct patient to call for assistance with activity based on assessment  - Modify environment to reduce risk of injury  - Consider OT/PT consult to assist with strengthening/mobility  Outcome: Progressing     Problem: Nutrition/Hydration-ADULT  Goal: Nutrient/Hydration intake appropriate for improving, restoring or maintaining nutritional needs  Description: Monitor and assess patient's nutrition/hydration status for malnutrition  Collaborate with interdisciplinary team and initiate plan and interventions as ordered  Monitor patient's weight and dietary intake as ordered or per policy  Utilize nutrition screening tool and intervene as necessary   Determine patient's food preferences and provide high-protein, high-caloric foods as appropriate       INTERVENTIONS:  - Monitor oral intake, urinary output, labs, and treatment plans  - Assess nutrition and hydration status and recommend course of action  - Evaluate amount of meals eaten  - Assist patient with eating if necessary   - Allow adequate time for meals  - Recommend/ encourage appropriate diets, oral nutritional supplements, and vitamin/mineral supplements  - Order, calculate, and assess calorie counts as needed  - Recommend, monitor, and adjust tube feedings and TPN/PPN based on assessed needs  - Assess need for intravenous fluids  - Provide specific nutrition/hydration education as appropriate  - Include patient/family/caregiver in decisions related to nutrition  Outcome: Progressing     Problem: CARDIOVASCULAR - ADULT  Goal: Maintains optimal cardiac output and hemodynamic stability  Description: INTERVENTIONS:  - Monitor I/O, vital signs and rhythm  - Monitor for S/S and trends of decreased cardiac output  - Administer and titrate ordered vasoactive medications to optimize hemodynamic stability  - Assess quality of pulses, skin color and temperature  - Assess for signs of decreased coronary artery perfusion  - Instruct patient to report change in severity of symptoms  Outcome: Progressing  Goal: Absence of cardiac dysrhythmias or at baseline rhythm  Description: INTERVENTIONS:  - Continuous cardiac monitoring, vital signs, obtain 12 lead EKG if ordered  - Administer antiarrhythmic and heart rate control medications as ordered  - Monitor electrolytes and administer replacement therapy as ordered  Outcome: Progressing     Problem: RESPIRATORY - ADULT  Goal: Achieves optimal ventilation and oxygenation  Description: INTERVENTIONS:  - Assess for changes in respiratory status  - Assess for changes in mentation and behavior  - Position to facilitate oxygenation and minimize respiratory effort  - Oxygen administered by appropriate delivery if ordered  - Initiate smoking cessation education as indicated  - Encourage broncho-pulmonary hygiene including cough, deep breathe, Incentive Spirometry  - Assess the need for suctioning and aspirate as needed  - Assess and instruct to report SOB or any respiratory difficulty  - Respiratory Therapy support as indicated  Outcome: Progressing

## 2021-02-09 NOTE — RESTORATIVE TECHNICIAN NOTE
Restorative Specialist Mobility Note       Activity: Ambulate in [place], Chair, Bathroom privileges     Assistive Device: Front wheel walker        Repositioned: Sitting, Up in chair, Other (Comment)(Chair ALARM ON)

## 2021-02-09 NOTE — RESTORATIVE TECHNICIAN NOTE
Restorative Specialist Mobility Note       Activity: Ambulate in [place], Bathroom privileges     Assistive Device: Front wheel walker        Repositioned: Sitting, Up in chair

## 2021-02-09 NOTE — QUICK NOTE
Telemetry reviewed again today showing pacing with no episodes of atrial fibrillation overnight  Her amiodarone regiment was discussed and as she has received close to 10 grams thus far, will decrease dose to amio 200 mg daily today  Did have device interrogated along with site check performed - will notify our device clinic so that she can be set up with future appointments  She also has EP follow up in place as she will be maintained on amiodarone as an outpatient  She is stable from our standpoint for discharge at this time

## 2021-02-09 NOTE — PROGRESS NOTES
Cardiology Progress Note - León Ramos 79 y o  female MRN: [id number]    Unit/Bed#: [facility] 531-01 Encounter: [id number]    Assessment and plan  1  Paroxysmal atrial fibrillation  2  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status post pacer  3  Acute on chronic diastolic congestive heart failure  4  Coronary disease status post left main PCI history of CABG  5  Hypertension  6  Hyperlipidemia    Recommendations:  From my standpoint she has been doing well and her volume status is euvolemic on oral Bumex  Would continue current dosing  Electrophysiology is also following they are managing the amiodarone  Blood pressure heart rate have been stable  She is clear from my end to go to would ever rehab they decide is appropriate for her  Please call if any questions    Subjective:    No significant events overnight  Patient feels well today offers no complaints  She is eager to go home and start rehabilitating  Denies any chest pain, shortness of breath, palpitations  No events on telemetry  ROS    Objective:   Vitals: Blood pressure 136/60, pulse 76, temperature 97 8 °F (36 6 °C), temperature source Oral, resp  rate 17, weight 59 8 kg (131 lb 13 4 oz), SpO2 96 %, not currently breastfeeding , Body mass index is 27 55 kg/m² ,   Orthostatic Blood Pressures      Most Recent Value   Blood Pressure  136/60 filed at 02/09/2021 0749   Patient Position - Orthostatic VS  Sitting filed at 02/08/2021 6405         Systolic (49ZHC), IGL:902 , Min:109 , ZEI:501     Diastolic (20TLA), EMD:35, Min:54, Max:68      Intake/Output Summary (Last 24 hours) at 2/9/2021 0940  Last data filed at 2/8/2021 1900  Gross per 24 hour   Intake 180 ml   Output --   Net 180 ml     Weight (last 2 days)     Date/Time   Weight    02/09/21 0600   59 8 (131 84)    02/08/21 0520   60 2 (132 72)    02/07/21 0529   62 4 (137 5)                Telemetry Review: No significant arrhythmias seen on telemetry review     EKG personally reviewed by Charlotte Menezes DO  Physical Exam:  Vital signs reviewed  General:  Alert and cooperative, appears stated age, no acute distress  HEENT:  PERRLA, EOMI, no scleral icterus, no conjunctival pallor  Neck:  No lymphadenopathy, no thyromegaly, no carotid bruits, no elevated JVP  Heart:  Regular rate and rhythm, normal S1/S2, no S3/S4, no murmur, rubs or gallops  PMI nondisplaced  Lungs:  Clear to auscultation bilaterally, no wheezes rales or rhonchi  Abdomen:  Soft, non-tender, positive bowel sounds, no rebound or guarding,   no organomegaly   Extremities:  Normal range of motion    No clubbing, cyanosis or edema   Vascular:  2+ pedal pulses  Skin:  No rashes or lesions on exposed skin  Neurologic:  Cranial nerves II-XII grossly intact without focal deficits  Psych:  Normal mood and affect      Laboratory Results:  Results from last 7 days   Lab Units 02/03/21  0613 02/02/21 2011 02/02/21  1652   TROPONIN I ng/mL 0 06* 0 07* 0 06*       CBC with diff:   Results from last 7 days   Lab Units 02/06/21  0506 02/04/21  0228 02/02/21  1652   WBC Thousand/uL 7 38 8 38 7 88   HEMOGLOBIN g/dL 9 4* 13 2 10 9*   HEMATOCRIT % 29 5* 42 4 34 0*   MCV fL 96 98 95   PLATELETS Thousands/uL 185 245 229   MCH pg 30 4 30 5 30 4   MCHC g/dL 31 9 31 1* 32 1   RDW % 15 2* 15 0 14 8   MPV fL 10 1 10 1 9 7   NRBC AUTO /100 WBCs  --   --  0         CMP:  Results from last 7 days   Lab Units 02/08/21  0441 02/07/21  0529 02/06/21  0506 02/05/21  1224 02/05/21  0547 02/04/21  0209 02/02/21  1652   POTASSIUM mmol/L 3 8 3 3* 3 8 3 1* 3 5  3 5 5 2 3 8   CHLORIDE mmol/L 99* 97* 98* 96* 102  101 98* 103   CO2 mmol/L 34* 36* 30 32 30  30 27 26   BUN mg/dL 7 5 5 6 7  7 8 9   CREATININE mg/dL 0 72 0 66 0 60 0 71 0 62  0 58* 0 82 0 59*   CALCIUM mg/dL 8 8 8 3 8 1* 8 2* 8 5  8 4 9 0 8 6   EGFR ml/min/1 73sq m 79 83 86 80 85  87 67 86         BMP:  Results from last 7 days   Lab Units 02/08/21  0441 02/07/21  0529 02/06/21  0506 02/05/21  1224 02/05/21  0547 21  0209 21  1652   POTASSIUM mmol/L 3 8 3 3* 3 8 3 1* 3 5  3 5 5 2 3 8   CHLORIDE mmol/L 99* 97* 98* 96* 102  101 98* 103   CO2 mmol/L 34* 36* 30 32 30  30 27 26   BUN mg/dL 7 5 5 6 7  7 8 9   CREATININE mg/dL 0 72 0 66 0 60 0 71 0 62  0 58* 0 82 0 59*   CALCIUM mg/dL 8 8 8 3 8 1* 8 2* 8 5  8 4 9 0 8 6       BNP: No results for input(s): BNP in the last 72 hours  Magnesium:   Results from last 7 days   Lab Units 21  0441 21  0506 21  1224 21  0547 21  0209 21  1652   MAGNESIUM mg/dL 1 6 2 0 1 6 1 6  1 6 2 0 3 6*       Coags:       TSH:        Hemoglobin A1C       Lipid Profile:       Cardiac testing:   Results for orders placed during the hospital encounter of 20   Echo complete with contrast if indicated    Tim [address]s [address]  [phone]    Transthoracic Echocardiogram  2D, M-mode, Doppler, and Color Doppler    Study date:  03-Dec-2020    Patient: Catalino Chairez  MR number: [id number]  Account number: [de-identified]  : 1940  Age: 79 years  Gender: Female  Status: Inpatient  Location: Bedside  Height: 60 in  Weight: 136 lb  BP: 99/ 53 mmHg    Indications: Heart Failure    Diagnoses: I50 9 - Heart failure, unspecified    Sonographer:  Flavia Jackson RDCS  Primary Physician:  Erma Kim MD  Referring Physician:  Nori Costello DO  Group:  [id number]  Interpreting Physician:  Marion Leon MD    SUMMARY    LEFT VENTRICLE:  Systolic function was mildly reduced  Ejection fraction was estimated to be 45 %  There was mild diffuse hypokinesis  Wall thickness was mildly to moderately increased  There was mild concentric hypertrophy  Doppler parameters were consistent with abnormal left ventricular relaxation (grade 1 diastolic dysfunction)  VENTRICULAR SEPTUM:  There was paradoxical motion  These changes are consistent with LBBB      LEFT ATRIUM:  The atrium was mildly dilated  MITRAL VALVE:  There was mild to moderate regurgitation  TRICUSPID VALVE:  There was mild regurgitation  Estimated peak PA pressure was 55 mmHg  The findings suggest moderate pulmonary hypertension  PULMONIC VALVE:  There was trace regurgitation  HISTORY: PRIOR HISTORY: CAD, Hypertension, GERD, Hyperlipidemia, CP, Stent, CABG, Raynaud's    PROCEDURE: The procedure was performed at the bedside  This was a routine study  The transthoracic approach was used  The study included complete 2D imaging, M-mode, complete spectral Doppler, and color Doppler  The heart rate was 82 bpm,  at the start of the study  Images were obtained from the parasternal, apical, subcostal, and suprasternal notch acoustic windows  Intravenous contrast ( 0 6ml Definity in NSS) was administered to opacify the left ventricle  Echocardiographic views were limited due to decreased penetration and lung interference  This was a technically difficult study  LEFT VENTRICLE: Size was normal  Systolic function was mildly reduced  Ejection fraction was estimated to be 45 %  There was mild diffuse hypokinesis  Wall thickness was mildly to moderately increased  There was mild concentric  hypertrophy  DOPPLER: Doppler parameters were consistent with abnormal left ventricular relaxation (grade 1 diastolic dysfunction)  VENTRICULAR SEPTUM: There was paradoxical motion  These changes are consistent with LBBB  RIGHT VENTRICLE: The size was normal  Systolic function was normal  Wall thickness was normal     LEFT ATRIUM: The atrium was mildly dilated  RIGHT ATRIUM: Size was normal     MITRAL VALVE: Valve structure was normal  There was normal leaflet separation  DOPPLER: The transmitral velocity was within the normal range  There was no evidence for stenosis  There was mild to moderate regurgitation  AORTIC VALVE: The valve was trileaflet   Leaflets exhibited normal thickness and normal cuspal separation  DOPPLER: Transaortic velocity was within the normal range  There was no evidence for stenosis  There was no regurgitation  TRICUSPID VALVE: The valve structure was normal  There was normal leaflet separation  DOPPLER: The transtricuspid velocity was within the normal range  There was no evidence for stenosis  There was mild regurgitation  Estimated peak PA  pressure was 55 mmHg  The findings suggest moderate pulmonary hypertension  PULMONIC VALVE: Leaflets exhibited normal thickness, no calcification, and normal cuspal separation  DOPPLER: The transpulmonic velocity was within the normal range  There was trace regurgitation  PERICARDIUM: There was no pericardial effusion  The pericardium was normal in appearance  AORTA: The root exhibited normal size  SYSTEM MEASUREMENT TABLES    2D  %FS: 17 94 %  Ao Diam: 2 72 cm  Ao asc: 2 46 cm  EDV(Teich): 55 79 ml  EF(Teich): 38 05 %  ESV(Teich): 34 57 ml  IVSd: 1 08 cm  LA Area: 16 14 cm2  LA Diam: 3 04 cm  LVEDV MOD A4C: 52 91 ml  LVEF MOD A4C: 38 16 %  LVESV MOD A4C: 32 72 ml  LVIDd: 3 64 cm  LVIDs: 2 98 cm  LVLd A4C: 6 09 cm  LVLs A4C: 5 64 cm  LVPWd: 0 93 cm  RA Area: 7 24 cm2  RVIDd: 2 34 cm  SV MOD A4C: 20 19 ml  SV(Teich): 21 23 ml    CW  TR Vmax: 3 73 m/s  TR maxP 52 mmHg    MM  TAPSE: 2 12 cm    PW  E' Sept: 0 04 m/s  E/E' Sept: 21 24  MV A Nicola: 1 42 m/s  MV Dec [place]: 4 42 m/s2  MV DecT: 191 8 ms  MV E Nicola: 0 85 m/s  MV E/A Ratio: 0 6  MV PHT: 55 62 ms  MVA By PHT: 3 96 cm2    [employer] Accredited Echocardiography Laboratory    Prepared and electronically signed by    Josias Isabel MD  Signed 03-Dec-2020 13:58:50       No results found for this or any previous visit  No results found for this or any previous visit  No results found for this or any previous visit      Meds/Allergies   all current active meds have been reviewed  Medications Prior to Admission   Medication    amLODIPine (NORVASC) 10 mg tablet    apixaban (ELIQUIS) 5 mg    aspirin (ECOTRIN LOW STRENGTH) 81 mg EC tablet    atorvastatin (LIPITOR) 20 mg tablet    docusate sodium (Colace) 100 mg capsule    furosemide (LASIX) 40 mg tablet    isosorbide mononitrate (IMDUR) 30 mg 24 hr tablet    metoprolol succinate (TOPROL-XL) 50 mg 24 hr tablet    multivitamin (THERAGRAN) TABS    nitroglycerin (NITROSTAT) 0 4 mg SL tablet    pantoprazole (PROTONIX) 40 mg tablet    polyethylene glycol (MIRALAX) 17 g packet    prasugrel (EFFIENT) tablet    ranolazine (RANEXA) 1000 MG SR tablet    rivaroxaban (XARELTO) 20 mg tablet    senna (SENOKOT) 8 6 mg          Assessment:  Principal Problem:    A-fib (HCC)  Active Problems:    Essential hypertension    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

## 2021-02-10 ENCOUNTER — APPOINTMENT (INPATIENT)
Dept: NON INVASIVE DIAGNOSTICS | Facility: HOSPITAL | Age: 81
DRG: 100 | End: 2021-02-10
Payer: MEDICARE

## 2021-02-10 LAB
ALBUMIN SERPL BCP-MCNC: 2.7 G/DL (ref 3.5–5)
ALP SERPL-CCNC: 71 U/L (ref 46–116)
ALT SERPL W P-5'-P-CCNC: 31 U/L (ref 12–78)
ANION GAP SERPL CALCULATED.3IONS-SCNC: 5 MMOL/L (ref 4–13)
AST SERPL W P-5'-P-CCNC: 44 U/L (ref 5–45)
BACTERIA UR QL AUTO: ABNORMAL /HPF
BILIRUB SERPL-MCNC: 0.89 MG/DL (ref 0.2–1)
BILIRUB UR QL STRIP: NEGATIVE
BUN SERPL-MCNC: 9 MG/DL (ref 5–25)
CALCIUM ALBUM COR SERPL-MCNC: 10 MG/DL (ref 8.3–10.1)
CALCIUM SERPL-MCNC: 9 MG/DL (ref 8.3–10.1)
CAOX CRY URNS QL MICRO: ABNORMAL /HPF
CHLORIDE SERPL-SCNC: 98 MMOL/L (ref 100–108)
CLARITY UR: ABNORMAL
CO2 SERPL-SCNC: 34 MMOL/L (ref 21–32)
COLOR UR: YELLOW
CREAT SERPL-MCNC: 0.72 MG/DL (ref 0.6–1.3)
ERYTHROCYTE [DISTWIDTH] IN BLOOD BY AUTOMATED COUNT: 15.1 % (ref 11.6–15.1)
GFR SERPL CREATININE-BSD FRML MDRD: 79 ML/MIN/1.73SQ M
GLUCOSE SERPL-MCNC: 97 MG/DL (ref 65–140)
GLUCOSE UR STRIP-MCNC: NEGATIVE MG/DL
HCT VFR BLD AUTO: 31.4 % (ref 34.8–46.1)
HGB BLD-MCNC: 10 G/DL (ref 11.5–15.4)
HGB UR QL STRIP.AUTO: ABNORMAL
HYALINE CASTS #/AREA URNS LPF: ABNORMAL /LPF
KETONES UR STRIP-MCNC: NEGATIVE MG/DL
LEUKOCYTE ESTERASE UR QL STRIP: ABNORMAL
MCH RBC QN AUTO: 29.9 PG (ref 26.8–34.3)
MCHC RBC AUTO-ENTMCNC: 31.8 G/DL (ref 31.4–37.4)
MCV RBC AUTO: 94 FL (ref 82–98)
NITRITE UR QL STRIP: NEGATIVE
NON-SQ EPI CELLS URNS QL MICRO: ABNORMAL /HPF
OTHER STN SPEC: ABNORMAL
PH UR STRIP.AUTO: 7 [PH]
PLATELET # BLD AUTO: 221 THOUSANDS/UL (ref 149–390)
PMV BLD AUTO: 10.1 FL (ref 8.9–12.7)
POTASSIUM SERPL-SCNC: 3.3 MMOL/L (ref 3.5–5.3)
PROT SERPL-MCNC: 6 G/DL (ref 6.4–8.2)
PROT UR STRIP-MCNC: NEGATIVE MG/DL
RBC # BLD AUTO: 3.34 MILLION/UL (ref 3.81–5.12)
RBC #/AREA URNS AUTO: ABNORMAL /HPF
SODIUM SERPL-SCNC: 137 MMOL/L (ref 136–145)
SP GR UR STRIP.AUTO: 1.01 (ref 1–1.03)
UROBILINOGEN UR QL STRIP.AUTO: 0.2 E.U./DL
WBC # BLD AUTO: 6.33 THOUSAND/UL (ref 4.31–10.16)
WBC #/AREA URNS AUTO: ABNORMAL /HPF

## 2021-02-10 PROCEDURE — 87086 URINE CULTURE/COLONY COUNT: CPT | Performed by: FAMILY MEDICINE

## 2021-02-10 PROCEDURE — 80053 COMPREHEN METABOLIC PANEL: CPT | Performed by: FAMILY MEDICINE

## 2021-02-10 PROCEDURE — 81001 URINALYSIS AUTO W/SCOPE: CPT | Performed by: FAMILY MEDICINE

## 2021-02-10 PROCEDURE — 93971 EXTREMITY STUDY: CPT

## 2021-02-10 PROCEDURE — 99232 SBSQ HOSP IP/OBS MODERATE 35: CPT | Performed by: FAMILY MEDICINE

## 2021-02-10 PROCEDURE — 97535 SELF CARE MNGMENT TRAINING: CPT

## 2021-02-10 PROCEDURE — 97530 THERAPEUTIC ACTIVITIES: CPT

## 2021-02-10 PROCEDURE — 85027 COMPLETE CBC AUTOMATED: CPT | Performed by: FAMILY MEDICINE

## 2021-02-10 PROCEDURE — 97116 GAIT TRAINING THERAPY: CPT

## 2021-02-10 RX ORDER — AMMONIUM LACTATE 12 G/100G
LOTION TOPICAL 2 TIMES DAILY PRN
Status: DISCONTINUED | OUTPATIENT
Start: 2021-02-10 | End: 2021-02-13 | Stop reason: HOSPADM

## 2021-02-10 RX ORDER — DIPHENHYDRAMINE HYDROCHLORIDE, ZINC ACETATE 2; .1 G/100G; G/100G
CREAM TOPICAL 3 TIMES DAILY PRN
Status: DISCONTINUED | OUTPATIENT
Start: 2021-02-10 | End: 2021-02-13 | Stop reason: HOSPADM

## 2021-02-10 RX ADMIN — MAGNESIUM OXIDE TAB 400 MG (241.3 MG ELEMENTAL MG) 400 MG: 400 (241.3 MG) TAB at 12:30

## 2021-02-10 RX ADMIN — AMIODARONE HYDROCHLORIDE 200 MG: 200 TABLET ORAL at 07:53

## 2021-02-10 RX ADMIN — PANTOPRAZOLE SODIUM 40 MG: 40 TABLET, DELAYED RELEASE ORAL at 05:00

## 2021-02-10 RX ADMIN — LEVETIRACETAM 500 MG: 500 TABLET, FILM COATED ORAL at 08:07

## 2021-02-10 RX ADMIN — RIVAROXABAN 15 MG: 15 TABLET, FILM COATED ORAL at 16:43

## 2021-02-10 RX ADMIN — DOXYCYCLINE 100 MG: 100 CAPSULE ORAL at 20:13

## 2021-02-10 RX ADMIN — MELATONIN 3 MG: at 21:36

## 2021-02-10 RX ADMIN — POTASSIUM CHLORIDE 40 MEQ: 1500 TABLET, EXTENDED RELEASE ORAL at 08:07

## 2021-02-10 RX ADMIN — ACETAMINOPHEN 650 MG: 325 TABLET, FILM COATED ORAL at 21:36

## 2021-02-10 RX ADMIN — Medication 1 PACKET: at 08:12

## 2021-02-10 RX ADMIN — ACETAMINOPHEN 650 MG: 325 TABLET, FILM COATED ORAL at 07:52

## 2021-02-10 RX ADMIN — DOXYCYCLINE 100 MG: 100 CAPSULE ORAL at 08:06

## 2021-02-10 RX ADMIN — ATORVASTATIN CALCIUM 20 MG: 20 TABLET, FILM COATED ORAL at 16:43

## 2021-02-10 RX ADMIN — BUMETANIDE 2 MG: 2 TABLET ORAL at 08:07

## 2021-02-10 RX ADMIN — MAGNESIUM OXIDE TAB 400 MG (241.3 MG ELEMENTAL MG) 400 MG: 400 (241.3 MG) TAB at 16:43

## 2021-02-10 RX ADMIN — DIPHENHYDRAMINE HYDROCHLORIDE, ZINC ACETATE 1 APPLICATION: 2; .1 CREAM TOPICAL at 04:44

## 2021-02-10 RX ADMIN — Medication 250 MG: at 17:06

## 2021-02-10 RX ADMIN — METOPROLOL SUCCINATE 25 MG: 25 TABLET, EXTENDED RELEASE ORAL at 08:07

## 2021-02-10 RX ADMIN — Medication 250 MG: at 08:07

## 2021-02-10 RX ADMIN — LEVETIRACETAM 500 MG: 500 TABLET, FILM COATED ORAL at 20:13

## 2021-02-10 RX ADMIN — PRASUGREL HYDROCHLORIDE 10 MG: 10 TABLET, FILM COATED ORAL at 08:06

## 2021-02-10 NOTE — PROGRESS NOTES
Progress Note - Vi Rudd 1940, 79 y o  female MRN: [id number]    Unit/Bed#: [facility] 531-01 Encounter: [id number]    Primary Care Provider: Gunnar Tijerina MD   Date and time admitted to hospital: 1/29/2021  3:37 PM        * A-fib [facility])  Assessment & Plan  · Remains in SR, rate controlled, amiodarone 200 mg daily per  EP  · EP/cardiology on board  · Cont metoprolol    · A/c with xarelto        Hyperlipidemia  Assessment & Plan  · Continue statin    Seizure-like activity ([facility] [address] )  Assessment & Plan  · Rapid response initiated while pt was at [facility] d/t seizure like activity  · S/p Neurology consult  · S/p EEG  · Not sure if able to have MRI as positive PPM; s/p CT head  · On keppra BID  · Sz precautions  · PENNDOT filed per neurology  · No recurring sz like activity  ·       Urinary retention  Assessment & Plan  Voiding trial today with urinary retention protocol in place  Thus far has been voiding   Pt c/o pyuria but unsure if may have been related to positioning dominguez catheter as once adjusted symptoms resolved  Regardless + pyuria, + VRE in urine cx dated 1/22; sens to tetracycline, changed abx to doxycycline x 7 days  Patient was complaining of new onset of dysuria since last night  Felt like previous urinary tract infection    Will obtain a UA and follow-up on culture    Hyponatremia  Assessment & Plan  Secondary to volume overload,   Resolved    Dysphagia  Assessment & Plan  · Resolved; s/p speech/GI eval 2/2; cleared for upgrade of diet to regular     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[address] )  Assessment & Plan  · + Pacemaker in place, 1/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    CAD (coronary artery disease)  Assessment & Plan  S/p cardiac cath/PCI/LAMINE x 2 1/11/2021  On effient, per cards on past admission d/c of asa given on xarelto as well  Pt is CP free    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)  Assessment & Plan  Wt Readings from Last 3 Encounters:   02/10/21 58 6 kg (129 lb 3 oz)   01/27/21 70 kg (154 lb 4 8 oz) 21 63 7 kg (140 lb 6 4 oz)   Acute on chronic systolic/diastolic HF, ef of 06%; possibly precipitated by afib with rvr  Cardiology on board, transitioned to po starting , recommends to monitor overnight for response   Cont I/O, daily wts  Good diuretic response  Patient cleared from Cardiology for discharge        Essential hypertension  Assessment & Plan  · Stable      VTE Pharmacologic Prophylaxis:   Pharmacologic: Rivaroxaban (Xarelto)  Mechanical VTE Prophylaxis in Place: Yes    Patient Centered Rounds: I have performed bedside rounds with nursing staff today  Discussions with Specialists or Other Care Team Provider:     Education and Discussions with Family / Patient:  Patient and daughter    Time Spent for Care: 30 minutes  More than 50% of total time spent on counseling and coordination of care as described above  Current Length of Stay: 12 day(s)    Current Patient Status: Inpatient   Certification Statement: The patient will continue to require additional inpatient hospital stay due to Pending rehab place    Discharge Plan:     Code Status: Level 3 - DNAR and DNI      Subjective:   Patient seen and examined  Patient reported the since last night patient is having urinary frequency and dysuria similar to her previous UTI  Patient is worried that she is having and for urinary tract infection  Patient is also complaining of left upper extremity pain and swelling    Objective:     Vitals:   Temp (24hrs), Av 3 °F (36 8 °C), Min:97 6 °F (36 4 °C), Max:99 6 °F (37 6 °C)    Temp:  [97 6 °F (36 4 °C)-99 6 °F (37 6 °C)] 98 1 °F (36 7 °C)  HR:  [80-82] 80  Resp:  [16-19] 16  BP: (109-128)/(50-89) 109/89  SpO2:  [95 %-100 %] 100 %  Body mass index is 27 kg/m²  Input and Output Summary (last 24 hours):        Intake/Output Summary (Last 24 hours) at 2/10/2021 1703  Last data filed at 2/10/2021 1601  Gross per 24 hour   Intake 470 ml   Output 200 ml   Net 270 ml       Physical Exam:     Physical Exam  Constitutional:       General: She is not in acute distress  Appearance: Normal appearance  HENT:      Head: Normocephalic  Nose: Nose normal    Eyes:      General: No scleral icterus  Neck:      Musculoskeletal: Normal range of motion  Cardiovascular:      Rate and Rhythm: Normal rate  Pulmonary:      Effort: Pulmonary effort is normal    Abdominal:      General: Abdomen is flat  Musculoskeletal: Normal range of motion  Comments: Left upper extremity tenderness and swelling  Right upper extremity picc line   Neurological:      General: No focal deficit present  Mental Status: She is alert  Additional Data:     Labs:    Results from last 7 days   Lab Units 02/10/21  0444   WBC Thousand/uL 6 33   HEMOGLOBIN g/dL 10 0*   HEMATOCRIT % 31 4*   PLATELETS Thousands/uL 221     Results from last 7 days   Lab Units 02/10/21  0444   POTASSIUM mmol/L 3 3*   CHLORIDE mmol/L 98*   CO2 mmol/L 34*   BUN mg/dL 9   CREATININE mg/dL 0 72   CALCIUM mg/dL 9 0   ALK PHOS U/L 71   ALT U/L 31   AST U/L 44           * I Have Reviewed All Lab Data Listed Above  * Additional Pertinent Lab Tests Reviewed:  [address] Admission Reviewed    Imaging:    Imaging Reports Reviewed Today Include:   Imaging Personally Reviewed by Myself Includes:      Recent Cultures (last 7 days):           Last 24 Hours Medication List:   Current Facility-Administered Medications   Medication Dose Route Frequency Provider Last Rate    acetaminophen  650 mg Oral Q6H PRN Suzan Park PA-C      [facility]  [facility] [address]      atorvastatin  20 mg Oral Daily With [employer]      bumetanide  2 mg Oral Daily Diamantina Scheuermann, MD      diphenhydrAMINE-zinc acetate   Topical TID PRN Mark Rivera PA-C      doxycycline hyclate  100 mg Oral Q12H [facility] Bonnie Curran DO      hydrALAZINE  5 mg Intravenous Q6H PRN Suzan Park PA-C      levETIRAcetam  500 mg Oral Q12H [address] Renae Pantoja PA-C      lidocaine (PF)  5 mL Infiltration Once Renae Pantoja PA-C      magnesium oxide  400 mg Oral BID before lunch/dinner Leeann Pressley, DO      melatonin  3 mg Oral HS PRN [facility], DO      metoprolol succinate  25 mg Oral Daily [place], [place]      ondansetron  4 mg Intravenous Q6H PRN Renae Pantoja PA-C      oxyCODONE  2 5 mg Oral Q4H PRN Renae Pantoja PA-C      pantoprazole  40 mg Oral Early [place] Nickel, CRNP      potassium chloride  40 mEq Oral Daily [facility], DO      prasugrel  10 mg Oral Daily Renae Pantoja PA-C      psyllium  1 packet Oral Daily Ricky Billy      rivaroxaban  15 mg Oral Daily With [employer], CARLY      saccharomyces boulardii  250 mg Oral BID Renae Pantoja PA-C          Today, Patient Was Seen By: Neol Love MD    ** Please Note: Dictation voice to text software may have been used in the creation of this document   **

## 2021-02-10 NOTE — ASSESSMENT & PLAN NOTE
Wt Readings from Last 3 Encounters:   02/10/21 58 6 kg (129 lb 3 oz)   01/27/21 70 kg (154 lb 4 8 oz)   01/21/21 63 7 kg (140 lb 6 4 oz)   Acute on chronic systolic/diastolic HF, ef of 54%; possibly precipitated by afib with rvr  Cardiology on board, transitioned to po starting , recommends to monitor overnight for response   Cont I/O, daily wts  Good diuretic response  Patient cleared from Cardiology for discharge

## 2021-02-10 NOTE — ASSESSMENT & PLAN NOTE
· Remains in SR, rate controlled, amiodarone 200 mg daily per  EP  · EP/cardiology on board  · Cont metoprolol    · A/c with xarelto  · Cont telemetry monitoring

## 2021-02-10 NOTE — ASSESSMENT & PLAN NOTE
· Remains in SR, rate controlled, amiodarone 200 mg daily per  EP  · EP/cardiology on board  · Cont metoprolol    · A/c with xarelto

## 2021-02-10 NOTE — PLAN OF CARE
Problem: PHYSICAL THERAPY ADULT  Goal: Performs mobility at highest level of function for planned discharge setting  See evaluation for individualized goals  Description: Treatment/Interventions: Functional transfer training, LE strengthening/ROM, Therapeutic exercise, Endurance training, Patient/family training, Equipment eval/education, Bed mobility, Gait training, Spoke to nursing  Equipment Recommended: Hema Guadalupe       See flowsheet documentation for full assessment, interventions and recommendations  Outcome: Progressing  Note: Prognosis: Good  Problem List: Decreased strength, Decreased endurance, Impaired balance, Decreased mobility, Decreased safety awareness  Assessment: Pt seen for PT treatment session with focus on functional transfers, functional mobility, stair negotiation, standing balance  Pt making steady progress toward goals this session  Pt demonstrates ability to ambulate increased distance this session, however, is limited by decreased endurance requiring sitting rest break  Pt able to trial stairs today, does so with difficulty due to remaining strength deficits  Pt presents with improved sitting and standing balance this session  Pt left upright in bedside chair with OT present with all needs in reach  Pt will benefit from skilled therapy in order to address current impairments and functional limitations  PT to follow pt and recommending rehab once medically cleared  The patient's AM-PAC Basic Mobility Inpatient Short Form Raw Score is 18, Standardized Score is 41 05  A standardized score less than 42 9 suggests the patient may benefit from discharge to post-acute rehabilitation services  Please also refer to the recommendation of the Physical Therapist for safe discharge planning    Barriers to Discharge: Inaccessible home environment     PT Discharge Recommendation: [id number]     PT - OK to Discharge: Yes(to rehab once medically cleared)    See flowsheet documentation for full assessment

## 2021-02-10 NOTE — ASSESSMENT & PLAN NOTE
Voiding trial today with urinary retention protocol in place  Thus far has been voiding   Pt c/o pyuria but unsure if may have been related to positioning dominguez catheter as once adjusted symptoms resolved  Regardless + pyuria, + VRE in urine cx dated 1/22; sens to tetracycline, changed abx to doxycycline x 7 days  Patient was complaining of new onset of dysuria since last night  Felt like previous urinary tract infection    Will obtain a UA and follow-up on culture

## 2021-02-10 NOTE — RESTORATIVE TECHNICIAN NOTE
Restorative Specialist Mobility Note       Activity: Ambulate in [place]     Assistive Device: Front wheel walker        Repositioned: Sitting, Up in chair

## 2021-02-10 NOTE — PHYSICAL THERAPY NOTE
PHYSICAL THERAPY NOTE          Patient Name: Sharan Chatman  [facility]'E Date: 2/10/2021     02/10/21 2047   PT Last Visit   PT Visit Date 02/10/21   Note Type   Note Type Treatment   Pain Assessment   Pain Assessment Tool Pain Assessment not indicated - pt denies pain   Restrictions/Precautions   Weight Bearing Precautions Per Order No   Other Precautions Cognitive; Chair Alarm; Bed Alarm; Fall Risk;Telemetry   General   Chart Reviewed Yes   Response to Previous Treatment Patient with no complaints from previous session  Family/Caregiver Present No   Cognition   Overall Cognitive Status Impaired   Arousal/Participation Alert   Attention Attends with cues to redirect   Orientation Level Oriented to person;Oriented to place; Disoriented to situation;Disoriented to time  (general time)   Memory Decreased recall of precautions;Decreased recall of recent events   Following Commands Follows one step commands with increased time or repetition   Comments Pt presents with decreased safety awareness and insight into deficits  Pt reports she will be able to manage at home despite difficulty with stairs  Pt with poor carryover of education throughout session, requires frequent repetitive cues  Subjective   Subjective "My  can help me at home"   Bed Mobility   Additional Comments OOB in chair upon PT arrival   Pt left upright in bedside chair with OT present and all needs in reach at end of therapy session  Transfers   Sit to Stand   (CGA progressing to supervision)   Additional items Assist x 1; Increased time required;Verbal cues   Stand to Sit   (CGA)   Additional items Assist x 1; Increased time required;Verbal cues   Additional Comments Transfers with RW   VC for hand placement and safety  Ambulation/Elevation   Gait pattern Excessively slow; Short stride; Foward flexed;Poor UE support;Decreased foot clearance   Gait Assistance   (CGA)   Additional items Assist x 1;Verbal cues; Tactile cues  (assist of second for chair follow)   Assistive Device Rolling walker   Distance 60 ft x 1   Stair Management Assistance 4  Minimal assist   Additional items Assist x 1;Verbal cues; Tactile cues   Stair Management Technique One rail R;Step to pattern; Sideways   Number of Stairs 2   Balance   Static Sitting Fair +   Dynamic Sitting Fair +   Static Standing Fair -   Dynamic Standing Fair -   Ambulatory Poor +   Endurance Deficit   Endurance Deficit Yes   Endurance Deficit Description fatigue, weakness   Activity Tolerance   Activity Tolerance Patient limited by fatigue   Medical Staff Made Aware OT Jessy   Nurse Made Aware RN cleared pt to be seen by PT   Exercises   Balance training  Pt dynamically stood for ~6 min with CGA with unilateral UE support on walker while washing up with OT  Assessment   Prognosis Good   Problem List Decreased strength;Decreased endurance; Impaired balance;Decreased mobility; Decreased safety awareness   Assessment Pt seen for PT treatment session with focus on functional transfers, functional mobility, stair negotiation, standing balance  Pt making steady progress toward goals this session  Pt demonstrates ability to ambulate increased distance this session, however, is limited by decreased endurance requiring sitting rest break  Pt able to trial stairs today, does so with difficulty due to remaining strength deficits  Pt presents with improved sitting and standing balance this session  Pt left upright in bedside chair with OT present with all needs in reach  Pt will benefit from skilled therapy in order to address current impairments and functional limitations  PT to follow pt and recommending rehab once medically cleared  The patient's AM-PAC Basic Mobility Inpatient Short Form Raw Score is 18, Standardized Score is 41 05  A standardized score less than 42 9 suggests the patient may benefit from discharge to post-acute rehabilitation services   Please also refer to the recommendation of the Physical Therapist for safe discharge planning  Barriers to Discharge Inaccessible home environment   Goals   Patient Goals to go home   STG Expiration Date 02/15/21   Plan   Treatment/Interventions Functional transfer training;LE strengthening/ROM; Elevations; Therapeutic exercise; Endurance training;Patient/family training;Equipment eval/education; Bed mobility;Gait training;Spoke to nursing   Progress Progressing toward goals   PT Frequency Other (Comment)  (3-5x/wk)   Recommendation   PT Discharge Recommendation Post-Acute Rehabilitation Services   Equipment Recommended Walker   PT - OK to Discharge Yes  (to rehab once medically cleared)   AM-PAC Basic Mobility Inpatient   Turning in Bed Without Bedrails 3   Lying on Back to Sitting on Edge of Flat Bed 3   Moving Bed to Chair 3   Standing Up From Chair 3   Walk in Room 3   Climb 3-5 Stairs 3   Basic Mobility Inpatient Raw Score 18   Basic Mobility Standardized Score 41 05     Kena Rasmussen, PT, DPT

## 2021-02-10 NOTE — ASSESSMENT & PLAN NOTE
· Rapid response initiated while pt was at [facility] d/t seizure like activity  · S/p Neurology consult  · S/p EEG  · Not sure if able to have MRI as positive PPM; s/p CT head  · On keppra BID  · Sz precautions  · PENNDOT filed per neurology  · No recurring sz like activity  ·

## 2021-02-10 NOTE — PLAN OF CARE
Problem: Prexisting or High Potential for Compromised Skin Integrity  Goal: Skin integrity is maintained or improved  Description: INTERVENTIONS:  - Identify patients at risk for skin breakdown  - Assess and monitor skin integrity  - Assess and monitor nutrition and hydration status  - Monitor labs   - Assess for incontinence   - Turn and reposition patient  - Assist with mobility/ambulation  - Relieve pressure over bony prominences  - Avoid friction and shearing  - Provide appropriate hygiene as needed including keeping skin clean and dry  - Evaluate need for skin moisturizer/barrier cream  - Collaborate with interdisciplinary team   - Patient/family teaching  - Consider wound care consult   Outcome: Progressing     Problem: Neurological Deficit  Goal: Neurological status is stable or improving  Description: Interventions:  - Monitor and assess patient's level of consciousness, motor function, sensory function, and level of assistance needed for ADLs  - Monitor and report changes from baseline  Collaborate with interdisciplinary team to initiate plan and implement interventions as ordered  - Provide and maintain a safe environment  - Consider seizure precautions  - Consider fall precautions  - Consider aspiration precautions  - Consider bleeding precautions  Outcome: Progressing     Problem: Activity Intolerance/Impaired Mobility  Goal: Mobility/activity is maintained at optimum level for patient  Description: Interventions:  - Assess and monitor patient  barriers to mobility and need for assistive/adaptive devices  - Assess patient's emotional response to limitations  - Collaborate with interdisciplinary team and initiate plans and interventions as ordered  - Encourage independent activity per ability   - Maintain proper body alignment  - Perform active/passive rom as tolerated/ordered    - Plan activities to conserve energy   - Turn patient as appropriate  Outcome: Progressing     Problem: Communication Impairment  Goal: Ability to express needs and understand communication  Description: Assess patient's communication skills and ability to understand information  Patient will demonstrate use of effective communication techniques, alternative methods of communication and understanding even if not able to speak  - Encourage communication and provide alternate methods of communication as needed  - Collaborate with case management/ for discharge needs  - Include patient/family/caregiver in decisions related to communication  Outcome: Progressing     Problem: Potential for Aspiration  Goal: Non-ventilated patient's risk of aspiration is minimized  Description: Assess and monitor vital signs, respiratory status, and labs (WBC)  Monitor for signs of aspiration (tachypnea, cough, rales, wheezing, cyanosis, fever)  - Assess and monitor patient's ability to swallow  - Place patient up in chair to eat if possible  - HOB up at 90 degrees to eat if unable to get patient up into chair   - Supervise patient during oral intake  - Instruct patient/ family to take small bites  - Instruct patient/ family to take small single sips when taking liquids  - Follow patient-specific strategies generated by speech pathologist   Outcome: Progressing     Problem: Nutrition  Goal: Nutrition/Hydration status is improving  Description: Monitor and assess patient's nutrition/hydration status for malnutrition (ex- brittle hair, bruises, dry skin, pale skin and conjunctiva, muscle wasting, smooth red tongue, and disorientation)  Collaborate with interdisciplinary team and initiate plan and interventions as ordered  Monitor patient's weight and dietary intake as ordered or per policy  Utilize nutrition screening tool and intervene per policy  Determine patient's food preferences and provide high-protein, high-caloric foods as appropriate       - Assist patient with eating   - Allow adequate time for meals   - Encourage patient to take dietary supplement as ordered  - Collaborate with clinical nutritionist   - Include patient/family/caregiver in decisions related to nutrition  Outcome: Progressing     Problem: PAIN - ADULT  Goal: Verbalizes/displays adequate comfort level or baseline comfort level  Description: Interventions:  - Encourage patient to monitor pain and request assistance  - Assess pain using appropriate pain scale  - Administer analgesics based on type and severity of pain and evaluate response  - Implement non-pharmacological measures as appropriate and evaluate response  - Consider cultural and social influences on pain and pain management  - Notify physician/advanced practitioner if interventions unsuccessful or patient reports new pain  Outcome: Progressing     Problem: INFECTION - ADULT  Goal: Absence or prevention of progression during hospitalization  Description: INTERVENTIONS:  - Assess and monitor for signs and symptoms of infection  - Monitor lab/diagnostic results  - Monitor all insertion sites, i e  indwelling lines, tubes, and drains  - Monitor endotracheal if appropriate and nasal secretions for changes in amount and color  - [place] appropriate cooling/warming therapies per order  - Administer medications as ordered  - Instruct and encourage patient and family to use good hand hygiene technique  - Identify and instruct in appropriate isolation precautions for identified infection/condition  Outcome: Progressing  Goal: Absence of fever/infection during neutropenic period  Description: INTERVENTIONS:  - Monitor WBC    Outcome: Progressing     Problem: SAFETY ADULT  Goal: Patient will remain free of falls  Description: INTERVENTIONS:  - Assess patient frequently for physical needs  -  Identify cognitive and physical deficits and behaviors that affect risk of falls    -  [place] fall precautions as indicated by assessment   - Educate patient/family on patient safety including physical limitations  - Instruct patient to call for assistance with activity based on assessment  - Modify environment to reduce risk of injury  - Consider OT/PT consult to assist with strengthening/mobility  Outcome: Progressing  Goal: Maintain or return to baseline ADL function  Description: INTERVENTIONS:  -  Assess patient's ability to carry out ADLs; assess patient's baseline for ADL function and identify physical deficits which impact ability to perform ADLs (bathing, care of mouth/teeth, toileting, grooming, dressing, etc )  - Assess/evaluate cause of self-care deficits   - Assess range of motion  - Assess patient's mobility; develop plan if impaired  - Assess patient's need for assistive devices and provide as appropriate  - Encourage maximum independence but intervene and supervise when necessary  - Involve family in performance of ADLs  - Assess for home care needs following discharge   - Consider OT consult to assist with ADL evaluation and planning for discharge  - Provide patient education as appropriate  Outcome: Progressing  Goal: Maintain or return mobility status to optimal level  Description: INTERVENTIONS:  - Assess patient's baseline mobility status (ambulation, transfers, stairs, etc )    - Identify cognitive and physical deficits and behaviors that affect mobility  - Identify mobility aids required to assist with transfers and/or ambulation (gait belt, sit-to-stand, lift, walker, cane, etc )  - [place] fall precautions as indicated by assessment  - Record patient progress and toleration of activity level on Mobility SBAR; progress patient to next Phase/Stage  - Instruct patient to call for assistance with activity based on assessment  - Consider rehabilitation consult to assist with strengthening/weightbearing, etc   Outcome: Progressing     Problem: DISCHARGE PLANNING  Goal: Discharge to home or other facility with appropriate resources  Description: INTERVENTIONS:  - Identify barriers to discharge w/patient and caregiver  - Arrange for needed discharge resources and transportation as appropriate  - Identify discharge learning needs (meds, wound care, etc )  - Arrange for interpretive services to assist at discharge as needed  - Refer to Case Management Department for coordinating discharge planning if the patient needs post-hospital services based on physician/advanced practitioner order or complex needs related to functional status, cognitive ability, or social support system  Outcome: Progressing     Problem: Knowledge Deficit  Goal: Patient/family/caregiver demonstrates understanding of disease process, treatment plan, medications, and discharge instructions  Description: Complete learning assessment and assess knowledge base  Interventions:  - Provide teaching at level of understanding  - Provide teaching via preferred learning methods  Outcome: Progressing     Problem: Potential for Falls  Goal: Patient will remain free of falls  Description: INTERVENTIONS:  - Assess patient frequently for physical needs  -  Identify cognitive and physical deficits and behaviors that affect risk of falls  -  [place] fall precautions as indicated by assessment   - Educate patient/family on patient safety including physical limitations  - Instruct patient to call for assistance with activity based on assessment  - Modify environment to reduce risk of injury  - Consider OT/PT consult to assist with strengthening/mobility  Outcome: Progressing     Problem: Nutrition/Hydration-ADULT  Goal: Nutrient/Hydration intake appropriate for improving, restoring or maintaining nutritional needs  Description: Monitor and assess patient's nutrition/hydration status for malnutrition  Collaborate with interdisciplinary team and initiate plan and interventions as ordered  Monitor patient's weight and dietary intake as ordered or per policy  Utilize nutrition screening tool and intervene as necessary   Determine patient's food preferences and provide high-protein, high-caloric foods as appropriate       INTERVENTIONS:  - Monitor oral intake, urinary output, labs, and treatment plans  - Assess nutrition and hydration status and recommend course of action  - Evaluate amount of meals eaten  - Assist patient with eating if necessary   - Allow adequate time for meals  - Recommend/ encourage appropriate diets, oral nutritional supplements, and vitamin/mineral supplements  - Order, calculate, and assess calorie counts as needed  - Recommend, monitor, and adjust tube feedings and TPN/PPN based on assessed needs  - Assess need for intravenous fluids  - Provide specific nutrition/hydration education as appropriate  - Include patient/family/caregiver in decisions related to nutrition  Outcome: Progressing     Problem: CARDIOVASCULAR - ADULT  Goal: Maintains optimal cardiac output and hemodynamic stability  Description: INTERVENTIONS:  - Monitor I/O, vital signs and rhythm  - Monitor for S/S and trends of decreased cardiac output  - Administer and titrate ordered vasoactive medications to optimize hemodynamic stability  - Assess quality of pulses, skin color and temperature  - Assess for signs of decreased coronary artery perfusion  - Instruct patient to report change in severity of symptoms  Outcome: Progressing  Goal: Absence of cardiac dysrhythmias or at baseline rhythm  Description: INTERVENTIONS:  - Continuous cardiac monitoring, vital signs, obtain 12 lead EKG if ordered  - Administer antiarrhythmic and heart rate control medications as ordered  - Monitor electrolytes and administer replacement therapy as ordered  Outcome: Progressing     Problem: RESPIRATORY - ADULT  Goal: Achieves optimal ventilation and oxygenation  Description: INTERVENTIONS:  - Assess for changes in respiratory status  - Assess for changes in mentation and behavior  - Position to facilitate oxygenation and minimize respiratory effort  - Oxygen administered by appropriate delivery if ordered  - Initiate smoking cessation education as indicated  - Encourage broncho-pulmonary hygiene including cough, deep breathe, Incentive Spirometry  - Assess the need for suctioning and aspirate as needed  - Assess and instruct to report SOB or any respiratory difficulty  - Respiratory Therapy support as indicated  Outcome: Progressing

## 2021-02-10 NOTE — ASSESSMENT & PLAN NOTE
· Rapid response initiated while pt was at [facility] d/t seizure like activity  · S/p Neurology consult  · S/p EEG  · Not sure if able to have MRI as positive PPM; s/p CT head  · On keppra BID  · Sz precautions  · PENNDOT filed per neurology  · No recurring sz like activity  ·

## 2021-02-10 NOTE — PLAN OF CARE
Problem: OCCUPATIONAL THERAPY ADULT  Goal: Performs self-care activities at highest level of function for planned discharge setting  See evaluation for individualized goals  Description: Treatment Interventions: ADL retraining, Functional transfer training, Endurance training, Patient/family training, Compensatory technique education          See flowsheet documentation for full assessment, interventions and recommendations  Outcome: Progressing  Note: Limitation: Decreased ADL status, Decreased endurance, Decreased self-care trans, Decreased high-level ADLs  Prognosis: Good  Assessment: Patient participated in Skilled OT session this date with interventions consisting of ADL re training with the use of correct body mechnaics, Energy Conservation techniques, safety awareness and fall prevention techniques, one handed dressing technique,  therapeutic activities to: increase activity tolerance, increase dynamic sit/ stand balance during functional activity  and increase OOB/ sitting tolerance   Patient agreeable to OT treatment session, upon arrival patient was found seated OOB to Chair  Pt participated in bed mobility, functional transfers, functional mobility, and full ADL session  Please refer to chart for functional levels  Patient requiring frequent re direction, verbal cues for safety, verbal cues for correct technique, verbal cues for pacing thru activity steps, cognitive assistance to anticipate next step, one step directives and frequent rest periods  Patient continues to be functioning below baseline level, occupational performance remains limited secondary to factors listed above and increased risk for falls and injury  From OT standpoint, recommendation at time of d/c would be Short Term Rehab  Patient to benefit from continued Occupational Therapy treatment while in the hospital to address deficits as defined above and maximize level of functional independence with ADLs and functional mobility  OT Discharge Recommendation: Post-Acute Rehabilitation Services  OT - OK to Discharge: Yes(when medically cleared)

## 2021-02-10 NOTE — PROGRESS NOTES
Progress Note - Helton Cesar 1940, 79 y o  female MRN: [id number]    Unit/Bed#: [facility] 531-01 Encounter: [id number]    Primary Care Provider: Gloria Jeffers MD   Date and time admitted to hospital: 1/29/2021  3:37 PM        * A-fib [facility])  Assessment & Plan  · Remains in SR, rate controlled, amiodarone 200 mg daily per  EP  · EP/cardiology on board  · Cont metoprolol    · A/c with xarelto  · Cont telemetry monitoring      Hyperlipidemia  Assessment & Plan  · Continue statin    Seizure-like activity ([facility] [address] )  Assessment & Plan  · Rapid response initiated while pt was at [facility] d/t seizure like activity  · S/p Neurology consult  · S/p EEG  · Not sure if able to have MRI as positive PPM; s/p CT head  · On keppra BID  · Sz precautions  · PENNDOT filed per neurology  · No recurring sz like activity  ·       Urinary retention  Assessment & Plan  Voiding trial today with urinary retention protocol in place  Thus far has been voiding   Pt c/o pyuria but unsure if may have been related to positioning dominguez catheter as once adjusted symptoms resolved      Regardless + pyuria, + VRE in urine cx dated 1/22; sens to tetracycline, changed abx to doxycycline x 7 days  No systemic illness to suggest acute infxn    Hyponatremia  Assessment & Plan  Secondary to volume overload,   Resolved    Dysphagia  Assessment & Plan  · Resolved; s/p speech/GI eval 2/2; cleared for upgrade of diet to regular     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[address] )  Assessment & Plan  · + Pacemaker in place, 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    CAD (coronary artery disease)  Assessment & Plan  S/p cardiac cath/PCI/LAMINE x 2 1/11/2021  On effient, per cards on past admission d/c of asa given on xarelto as well  Pt is CP free    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)  Assessment & Plan  Wt Readings from Last 3 Encounters:   02/09/21 59 8 kg (131 lb 13 4 oz)   01/27/21 70 kg (154 lb 4 8 oz)   01/21/21 63 7 kg (140 lb 6 4 oz)   Acute on chronic systolic/diastolic HF, ef of 79%; possibly precipitated by afib with rvr  Cardiology on board, transitioned to po starting today, recommends to monitor overnight for response   Cont I/O, daily wts  Good diuretic response        Essential hypertension  Assessment & Plan  · Stable      VTE Pharmacologic Prophylaxis:   Pharmacologic: Rivaroxaban (Xarelto)  Mechanical VTE Prophylaxis in Place: Yes    Patient Centered Rounds: I have performed bedside rounds with nursing staff today  Discussions with Specialists or Other Care Team Provider:     Education and Discussions with Family / Patient: daughter    Time Spent for Care: 30 minutes  More than 50% of total time spent on counseling and coordination of care as described above  Current Length of Stay: 11 day(s)    Current Patient Status: Inpatient   Certification Statement: The patient will continue to require additional inpatient hospital stay due to pending rehab    Discharge Plan:     Code Status: Level 3 - DNAR and DNI      Subjective:   Patient seen and examined, no events overnight     Objective:     Vitals:   Temp (24hrs), Av 6 °F (37 °C), Min:97 8 °F (36 6 °C), Max:99 6 °F (37 6 °C)    Temp:  [97 8 °F (36 6 °C)-99 6 °F (37 6 °C)] 99 6 °F (37 6 °C)  HR:  [76-82] 82  Resp:  [14-19] 19  BP: ()/(49-68) 122/50  SpO2:  [96 %-98 %] 96 %  Body mass index is 27 55 kg/m²  Input and Output Summary (last 24 hours): Intake/Output Summary (Last 24 hours) at 2021  Last data filed at 2021 1821  Gross per 24 hour   Intake 240 ml   Output --   Net 240 ml       Physical Exam:     Physical Exam  Constitutional:       General: She is not in acute distress  Appearance: Normal appearance  HENT:      Head: Normocephalic and atraumatic  Nose: Nose normal    Cardiovascular:      Rate and Rhythm: Normal rate and regular rhythm  Pulmonary:      Effort: Pulmonary effort is normal       Breath sounds: Normal breath sounds  Abdominal:      General: Abdomen is flat  Skin:     General: Skin is warm  Neurological:      General: No focal deficit present  Mental Status: She is alert  Additional Data:     Labs:    Results from last 7 days   Lab Units 02/06/21  0506   WBC Thousand/uL 7 38   HEMOGLOBIN g/dL 9 4*   HEMATOCRIT % 29 5*   PLATELETS Thousands/uL 185     Results from last 7 days   Lab Units 02/08/21  0441   POTASSIUM mmol/L 3 8   CHLORIDE mmol/L 99*   CO2 mmol/L 34*   BUN mg/dL 7   CREATININE mg/dL 0 72   CALCIUM mg/dL 8 8           * I Have Reviewed All Lab Data Listed Above  * Additional Pertinent Lab Tests Reviewed:  Jason 65 Admission Reviewed    Imaging:    Imaging Reports Reviewed Today Include:   Imaging Personally Reviewed by Myself Includes:      Recent Cultures (last 7 days):           Last 24 Hours Medication List:   Current Facility-Administered Medications   Medication Dose Route Frequency Provider Last Rate    acetaminophen  650 mg Oral Q6H PRN Cale Salt, PA-C      [START ON 2/10/2021] amiodarone  200 mg Oral Daily With [email or web address] [facility], PA-C      atorvastatin  20 mg Oral Daily With [employer], PA-C      bumetanide  2 mg Oral Daily Angelika Paul MD      doxycycline hyclate  100 mg Oral Q12H [address] Laura Diesel, DO      hydrALAZINE  5 mg Intravenous Q6H PRN Cale Salt, PA-C      levETIRAcetam  500 mg Oral Q12H [address] Cale Salt, PA-C      lidocaine (PF)  5 mL Infiltration Once Cale Salt, PA-C      magnesium oxide  400 mg Oral BID before lunch/dinner Laura Diesel, DO      melatonin  3 mg Oral HS PRN Laura Diesel, DO      metoprolol succinate  25 mg Oral Daily [place], [place]      ondansetron  4 mg Intravenous Q6H PRN Cale Salt, PA-C      oxyCODONE  2 5 mg Oral Q4H PRN Cale Salt, PA-C      pantoprazole  40 mg Oral Early Morning [employer], CRNP      potassium chloride  40 mEq Oral Daily Laura Diesel, DO      prasugrel  10 mg Oral Daily Tena Zamora PA-C      psyllium  1 packet Oral Daily Tena Zamora [place]      rivaroxaban  15 mg Oral Daily With Jodi Lubin PA-C      saccharomyces boulardii  250 mg Oral BID Tena Zamora PA-C          Today, Patient Was Seen By: Shailesh Hodge MD    ** Please Note: Dictation voice to text software may have been used in the creation of this document   **

## 2021-02-10 NOTE — CASE MANAGEMENT
BRIANA spoke with pt she said she is agreeable to go to STR  She was agreeable for me provide her daughter Elizabeth Manzano a list of STR facilities and have her choose her preferred facilities  Cm spoke with Elizabeth Manzano and pts  via phone  A post acute care recommendation was made by your mothers care team for STR  Discussed Freedom of Choice with caregiver  List of facilities given to caregiver via emailed  caregiver aware the list is custom filtered for them by zip code location and that [facility] post acute providers are designated  [address] email address - [email or web address]

## 2021-02-10 NOTE — OCCUPATIONAL THERAPY NOTE
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[facility] )    Dysphagia    Hyponatremia    Urinary retention    Seizure-like activity ([facility] )    Hyperlipidemia      Past Medical History:   Diagnosis Date    Anal fissure     Cardiac disorder     Cognitive changes 12/23/2020    Esophageal reflux     Esophagitis, reflux     Hemorrhoids     Hepatic hemangioma     Last Assessed: 1/13/2015    Herpes zoster     History of colonic polyps     Hypertension     Ischemic colitis ([facility] )     Lumbar herniated disc     Malignant neoplasm without specification of site ([facility] )     Nephrolithiasis     L  Lithotripsy    Nontoxic single thyroid nodule     Last Assessed: 1/13/2015    Osteoarthritis     Overactive bladder     Raynaud disease     Respiratory system disease     Sjogren's disease ([facility] )     Spinal stenosis     PONCHO (stress urinary incontinence, female)     Uterovaginal prolapse     Grade I-II       Past Surgical History:   Procedure Laterality Date    APPENDECTOMY  1947    CARDIAC SURGERY      CABG    CATARACT EXTRACTION Bilateral     COLONOSCOPY  2012    Fiberoptic    COLONOSCOPY      Resolved: 2006 - 2012 5 year f/u    CORONARY ANGIOPLASTY WITH STENT PLACEMENT      CORONARY ARTERY BYPASS GRAFT      Resolved: 2012    ESOPHAGOGASTRODUODENOSCOPY  2012    Diagnostic    HEMORROIDECTOMY      KNEE SURGERY      LITHOTRIPSY      Renal    MALIGNANT SKIN LESION EXCISION      Face; Resolved: 2004    DE ESOPHAGOGASTRODUODENOSCOPY TRANSORAL DIAGNOSTIC N/A 4/13/2016    Procedure: EGD AND COLONOSCOPY;  Surgeon: Jhonatan Yanes MD;  [facility];   Service: Gastroenterology    RENAL ARTERY STENT      SKIN LESION EXCISION      Scalp    SOFT TISSUE TUMOR RESECTION      Shoulder; Resolved: 1995    THROMBOLYSIS      Postoperative Thrombolysis PTCA    TONSILLECTOMY      Resolved: 1944        02/10/21 0850   OT Last Visit   OT Visit Date 02/10/21   Note Type   Note Type Treatment   Restrictions/Precautions   Weight Bearing Precautions Per Order No   Other Precautions Cognitive; Impulsive; Chair Alarm; Bed Alarm;Telemetry; Fall Risk;Pain   Lifestyle   Autonomy "I am tired today"    Reciprocal Relationships supportive family    Service to Others retired   Intrinsic Gratification enjoys [place]   Pain Assessment   Pain Assessment Tool Pain Assessment not indicated - pt denies pain   Pain Score No Pain   ADL   Where Assessed Chair   Grooming Assistance 5  Supervision/Setup   Grooming Deficit Setup;Verbal cueing;Supervision/safety; Increased time to complete;Standing with assistive device; Wash/dry hands; Wash/dry face   Grooming Comments Pt washed her face w/ set up assist while standing w/o support of AD, requirnig Min A for dynamic standing balance  UB Bathing Assistance 4  Minimal Assistance   UB Bathing Deficit Setup;Verbal cueing;Supervision/safety; Increased time to complete   UB Bathing Comments Pt required A to wash her back  LB Bathing Assistance 3  Moderate Assistance   LB Bathing Deficit Setup;Verbal cueing;Supervision/safety; Increased time to complete; Buttocks;Right lower leg including foot; Left lower leg including foot   LB Bathing Comments Pt able to wash B/L thighs while seated in chair, however difficulty reaching LEs  Pt required A for all lower leg bathing  Pt able to perform laura/buttock hygiene while in stance w/ unilat support of RW, however difficulty reaching for thoroughness, requiring A from therapist     [address] 4  Minimal Assistance   UB Dressing Deficit Setup;Verbal cueing;Supervision/safety; Increased time to complete;Pull around back; Fasteners   UB Dressing Comments [place]/donning gown   LB Dressing Assistance 2  Maximal Assistance   LB Dressing Deficit Setup;Verbal cueing;Supervision/safety; Increased time to complete; Don/doff R sock; Don/doff L sock   LB Dressing Comments [place]/donning socks   Toileting Comments Pt denies need at this time   Functional Standing Tolerance   Time ~1 minute   Activity laura/buttock hygiene while in stance w/ unilat support of RW   Bed Mobility   Supine to Sit Unable to assess   Sit to Supine Unable to assess   Additional Comments Pt seated OOB in chair upon OT arrival  Pt seated OOB in chair w/ chair alarm activated and all needs in reach s/p OT session  Transfers   Sit to Stand 5  Supervision  (CGA progressed to S)   Additional items Assist x 1; Increased time required;Verbal cues;Armrests   Stand to Sit 5  Supervision  (CGA progressed to S)   Additional items Assist x 1; Increased time required;Verbal cues;Armrests   Additional Comments Transfers w/ RW  VC and TC required for safety and hand placement  Pt w/ poor carryover of education  Functional Mobility   Functional Mobility 4  Minimal assistance   Additional Comments Pt demonstrated short distance household mobility in room w/ RW at [address] [facility] A level  Pt c/o fatigue and required x2 sitting rest breaks  Additional items Rolling walker   Cognition   Overall Cognitive Status Impaired   Arousal/Participation Alert   Attention Attends with cues to redirect   Orientation Level Oriented to person;Oriented to place; Disoriented to time;Disoriented to situation   Memory Decreased recall of precautions;Decreased recall of recent events;Decreased short term memory   Following Commands Follows one step commands with increased time or repetition   Comments Pt required increased encouragement to participate in therapy this day  Pt continues to present w/ decreased insight into deficits and decreased safety awareness  Pt w/ poor carryover of education  Pt required increased time for processing and repeat of commands for increased follow through of functional tasks      Activity Tolerance Activity Tolerance Patient limited by fatigue;Patient limited by pain;Treatment limited secondary to medical complications (Comment)   Medical Staff Made Aware PT staff; RN cleared Pt for OT session   Assessment   Assessment Patient participated in Skilled OT session this date with interventions consisting of ADL re training with the use of correct body mechnaics, Energy Conservation techniques, safety awareness and fall prevention techniques, one handed dressing technique,  therapeutic activities to: increase activity tolerance, increase dynamic sit/ stand balance during functional activity  and increase OOB/ sitting tolerance   Patient agreeable to OT treatment session, upon arrival patient was found seated OOB to Chair  Pt participated in bed mobility, functional transfers, functional mobility, and full ADL session  Please refer to chart for functional levels  Patient requiring frequent re direction, verbal cues for safety, verbal cues for correct technique, verbal cues for pacing thru activity steps, cognitive assistance to anticipate next step, one step directives and frequent rest periods  Patient continues to be functioning below baseline level, occupational performance remains limited secondary to factors listed above and increased risk for falls and injury  From OT standpoint, recommendation at time of d/c would be Short Term Rehab  Patient to benefit from continued Occupational Therapy treatment while in the hospital to address deficits as defined above and maximize level of functional independence with ADLs and functional mobility  Plan   Treatment Interventions ADL retraining;Functional transfer training;UE strengthening/ROM; Endurance training;Cognitive reorientation;Patient/family training;Equipment evaluation/education; Fine motor coordination activities; Compensatory technique education; Activityengagement; Energy conservation   Goal Expiration Date 02/16/21   OT Treatment Day 2   OT Frequency 3-5x/wk   Recommendation   OT Discharge Recommendation Post-Acute Rehabilitation Services   OT - OK to Discharge Yes  (when medically cleared)   AM-PAC Daily Activity Inpatient   Lower Body Dressing 2   Bathing 2   Toileting 2   Upper Body Dressing 3   Grooming 3   Eating 4   Daily Activity Raw Score 16   Daily Activity Standardized Score (Calc for Raw Score >=11) 35 96   AM-PAC Applied Cognition Inpatient   Following a Speech/Presentation 2   Understanding Ordinary Conversation 3   Taking Medications 2   Remembering Where Things Are Placed or Put Away 2   Remembering List of 4-5 Errands 2   Taking Care of Complicated Tasks 2   Applied Cognition Raw Score 13   Applied Cognition Standardized Score 30 46   Modified Lily Scale   Modified Lily Scale 4       Jesús Alex MS, OTR/L

## 2021-02-10 NOTE — ASSESSMENT & PLAN NOTE
Wt Readings from Last 3 Encounters:   02/09/21 59 8 kg (131 lb 13 4 oz)   01/27/21 70 kg (154 lb 4 8 oz)   01/21/21 63 7 kg (140 lb 6 4 oz)   Acute on chronic systolic/diastolic HF, ef of 81%; possibly precipitated by afib with rvr  Cardiology on board, transitioned to po starting today, recommends to monitor overnight for response   Cont I/O, daily wts  Good diuretic response

## 2021-02-11 LAB — BACTERIA UR CULT: NORMAL

## 2021-02-11 PROCEDURE — 99232 SBSQ HOSP IP/OBS MODERATE 35: CPT | Performed by: FAMILY MEDICINE

## 2021-02-11 PROCEDURE — 93971 EXTREMITY STUDY: CPT | Performed by: SURGERY

## 2021-02-11 RX ORDER — NYSTATIN 100000 [USP'U]/G
POWDER TOPICAL 2 TIMES DAILY
Status: DISCONTINUED | OUTPATIENT
Start: 2021-02-11 | End: 2021-02-13 | Stop reason: HOSPADM

## 2021-02-11 RX ADMIN — AMIODARONE HYDROCHLORIDE 200 MG: 200 TABLET ORAL at 08:05

## 2021-02-11 RX ADMIN — NYSTATIN 1 APPLICATION: 100000 POWDER TOPICAL at 17:25

## 2021-02-11 RX ADMIN — MAGNESIUM OXIDE TAB 400 MG (241.3 MG ELEMENTAL MG) 400 MG: 400 (241.3 MG) TAB at 10:42

## 2021-02-11 RX ADMIN — PANTOPRAZOLE SODIUM 40 MG: 40 TABLET, DELAYED RELEASE ORAL at 05:18

## 2021-02-11 RX ADMIN — Medication 250 MG: at 17:25

## 2021-02-11 RX ADMIN — LEVETIRACETAM 500 MG: 500 TABLET, FILM COATED ORAL at 08:06

## 2021-02-11 RX ADMIN — PRASUGREL HYDROCHLORIDE 10 MG: 10 TABLET, FILM COATED ORAL at 08:06

## 2021-02-11 RX ADMIN — Medication 1 PACKET: at 08:11

## 2021-02-11 RX ADMIN — RIVAROXABAN 15 MG: 15 TABLET, FILM COATED ORAL at 17:25

## 2021-02-11 RX ADMIN — ATORVASTATIN CALCIUM 20 MG: 20 TABLET, FILM COATED ORAL at 17:25

## 2021-02-11 RX ADMIN — BUMETANIDE 2 MG: 2 TABLET ORAL at 08:05

## 2021-02-11 RX ADMIN — DOXYCYCLINE 100 MG: 100 CAPSULE ORAL at 08:05

## 2021-02-11 RX ADMIN — POTASSIUM CHLORIDE 40 MEQ: 1500 TABLET, EXTENDED RELEASE ORAL at 08:05

## 2021-02-11 RX ADMIN — MAGNESIUM OXIDE TAB 400 MG (241.3 MG ELEMENTAL MG) 400 MG: 400 (241.3 MG) TAB at 17:25

## 2021-02-11 RX ADMIN — ACETAMINOPHEN 650 MG: 325 TABLET, FILM COATED ORAL at 08:11

## 2021-02-11 RX ADMIN — DOXYCYCLINE 100 MG: 100 CAPSULE ORAL at 20:00

## 2021-02-11 RX ADMIN — METOPROLOL SUCCINATE 25 MG: 25 TABLET, EXTENDED RELEASE ORAL at 08:05

## 2021-02-11 RX ADMIN — LEVETIRACETAM 500 MG: 500 TABLET, FILM COATED ORAL at 20:00

## 2021-02-11 RX ADMIN — Medication 250 MG: at 08:06

## 2021-02-11 NOTE — RESTORATIVE TECHNICIAN NOTE
Restorative Specialist Mobility Note       [facility]     Assistive Device: Front wheel walker

## 2021-02-12 PROBLEM — E87.1 HYPONATREMIA: Status: RESOLVED | Noted: 2021-01-21 | Resolved: 2021-02-12

## 2021-02-12 LAB
ANION GAP SERPL CALCULATED.3IONS-SCNC: 2 MMOL/L (ref 4–13)
BUN SERPL-MCNC: 12 MG/DL (ref 5–25)
CALCIUM SERPL-MCNC: 9.3 MG/DL (ref 8.3–10.1)
CHLORIDE SERPL-SCNC: 102 MMOL/L (ref 100–108)
CO2 SERPL-SCNC: 34 MMOL/L (ref 21–32)
CREAT SERPL-MCNC: 0.74 MG/DL (ref 0.6–1.3)
ERYTHROCYTE [DISTWIDTH] IN BLOOD BY AUTOMATED COUNT: 15.2 % (ref 11.6–15.1)
FLUAV RNA RESP QL NAA+PROBE: NEGATIVE
FLUBV RNA RESP QL NAA+PROBE: NEGATIVE
GFR SERPL CREATININE-BSD FRML MDRD: 76 ML/MIN/1.73SQ M
GLUCOSE SERPL-MCNC: 97 MG/DL (ref 65–140)
HCT VFR BLD AUTO: 32 % (ref 34.8–46.1)
HGB BLD-MCNC: 10.1 G/DL (ref 11.5–15.4)
MCH RBC QN AUTO: 30.2 PG (ref 26.8–34.3)
MCHC RBC AUTO-ENTMCNC: 31.6 G/DL (ref 31.4–37.4)
MCV RBC AUTO: 96 FL (ref 82–98)
PLATELET # BLD AUTO: 233 THOUSANDS/UL (ref 149–390)
PMV BLD AUTO: 10.2 FL (ref 8.9–12.7)
POTASSIUM SERPL-SCNC: 3.9 MMOL/L (ref 3.5–5.3)
RBC # BLD AUTO: 3.34 MILLION/UL (ref 3.81–5.12)
RSV RNA RESP QL NAA+PROBE: NEGATIVE
SARS-COV-2 RNA RESP QL NAA+PROBE: NEGATIVE
SODIUM SERPL-SCNC: 138 MMOL/L (ref 136–145)
WBC # BLD AUTO: 7.26 THOUSAND/UL (ref 4.31–10.16)

## 2021-02-12 PROCEDURE — 85027 COMPLETE CBC AUTOMATED: CPT | Performed by: FAMILY MEDICINE

## 2021-02-12 PROCEDURE — 97530 THERAPEUTIC ACTIVITIES: CPT

## 2021-02-12 PROCEDURE — 0241U HB NFCT DS VIR RESP RNA 4 TRGT: CPT | Performed by: FAMILY MEDICINE

## 2021-02-12 PROCEDURE — 99232 SBSQ HOSP IP/OBS MODERATE 35: CPT | Performed by: FAMILY MEDICINE

## 2021-02-12 PROCEDURE — 80048 BASIC METABOLIC PNL TOTAL CA: CPT | Performed by: FAMILY MEDICINE

## 2021-02-12 PROCEDURE — 97116 GAIT TRAINING THERAPY: CPT

## 2021-02-12 RX ORDER — FLUCONAZOLE 150 MG/1
150 TABLET ORAL ONCE
Status: COMPLETED | OUTPATIENT
Start: 2021-02-12 | End: 2021-02-12

## 2021-02-12 RX ADMIN — ATORVASTATIN CALCIUM 20 MG: 20 TABLET, FILM COATED ORAL at 16:42

## 2021-02-12 RX ADMIN — NYSTATIN: 100000 POWDER TOPICAL at 17:31

## 2021-02-12 RX ADMIN — POTASSIUM CHLORIDE 40 MEQ: 1500 TABLET, EXTENDED RELEASE ORAL at 08:29

## 2021-02-12 RX ADMIN — FLUCONAZOLE 150 MG: 150 TABLET ORAL at 20:05

## 2021-02-12 RX ADMIN — ACETAMINOPHEN 650 MG: 325 TABLET, FILM COATED ORAL at 05:33

## 2021-02-12 RX ADMIN — OXYCODONE HYDROCHLORIDE 2.5 MG: 5 TABLET ORAL at 08:29

## 2021-02-12 RX ADMIN — AMIODARONE HYDROCHLORIDE 200 MG: 200 TABLET ORAL at 08:29

## 2021-02-12 RX ADMIN — DOXYCYCLINE 100 MG: 100 CAPSULE ORAL at 20:05

## 2021-02-12 RX ADMIN — RIVAROXABAN 15 MG: 15 TABLET, FILM COATED ORAL at 16:42

## 2021-02-12 RX ADMIN — METOPROLOL SUCCINATE 25 MG: 25 TABLET, EXTENDED RELEASE ORAL at 08:29

## 2021-02-12 RX ADMIN — MAGNESIUM OXIDE TAB 400 MG (241.3 MG ELEMENTAL MG) 400 MG: 400 (241.3 MG) TAB at 11:39

## 2021-02-12 RX ADMIN — LEVETIRACETAM 500 MG: 500 TABLET, FILM COATED ORAL at 08:29

## 2021-02-12 RX ADMIN — PRASUGREL HYDROCHLORIDE 10 MG: 10 TABLET, FILM COATED ORAL at 08:29

## 2021-02-12 RX ADMIN — Medication 250 MG: at 08:29

## 2021-02-12 RX ADMIN — Medication 1 PACKET: at 08:20

## 2021-02-12 RX ADMIN — MAGNESIUM OXIDE TAB 400 MG (241.3 MG ELEMENTAL MG) 400 MG: 400 (241.3 MG) TAB at 16:42

## 2021-02-12 RX ADMIN — Medication 250 MG: at 17:31

## 2021-02-12 RX ADMIN — ACETAMINOPHEN 650 MG: 325 TABLET, FILM COATED ORAL at 20:49

## 2021-02-12 RX ADMIN — ACETAMINOPHEN 650 MG: 325 TABLET, FILM COATED ORAL at 01:05

## 2021-02-12 RX ADMIN — DOXYCYCLINE 100 MG: 100 CAPSULE ORAL at 08:29

## 2021-02-12 RX ADMIN — OXYCODONE HYDROCHLORIDE 2.5 MG: 5 TABLET ORAL at 22:50

## 2021-02-12 RX ADMIN — NYSTATIN: 100000 POWDER TOPICAL at 08:19

## 2021-02-12 RX ADMIN — BUMETANIDE 2 MG: 2 TABLET ORAL at 08:29

## 2021-02-12 RX ADMIN — LEVETIRACETAM 500 MG: 500 TABLET, FILM COATED ORAL at 20:05

## 2021-02-12 RX ADMIN — PANTOPRAZOLE SODIUM 40 MG: 40 TABLET, DELAYED RELEASE ORAL at 05:33

## 2021-02-12 NOTE — ASSESSMENT & PLAN NOTE
· Rapid response initiated while pt was at [facility] d/t seizure like activity  · S/p Neurology consult  · S/p EEG  · Not sure if able to have MRI as positive PPM; s/p CT head  · On keppra BID  · Sz precautions  · PENNDOT filed per neurology  · No recurring sz like activity  ·

## 2021-02-12 NOTE — ASSESSMENT & PLAN NOTE
Wt Readings from Last 3 Encounters:   02/10/21 58 6 kg (129 lb 3 oz)   01/27/21 70 kg (154 lb 4 8 oz)   01/21/21 63 7 kg (140 lb 6 4 oz)   Acute on chronic systolic/diastolic HF, ef of 69%; possibly precipitated by afib with rvr  Cardiology on board, transitioned to po starting   Cont I/O, daily wts  Good diuretic response  Patient cleared from Cardiology for discharge

## 2021-02-12 NOTE — ASSESSMENT & PLAN NOTE
Voiding trial today with urinary retention protocol in place  Thus far has been voiding   Pt c/o pyuria but unsure if may have been related to positioning dominguez catheter as once adjusted symptoms resolved  Regardless + pyuria, + VRE in urine cx dated 1/22; sens to tetracycline, changed abx to doxycycline x 7 days  Patient was complaining of new onset of dysuria since last night  Felt like previous urinary tract infection    Will obtain a UA and follow-up on culture-  Follow-up on the cultures  Continue with doxycycline to finish the course

## 2021-02-12 NOTE — PLAN OF CARE
Problem: PHYSICAL THERAPY ADULT  Goal: Performs mobility at highest level of function for planned discharge setting  See evaluation for individualized goals  Description: Treatment/Interventions: Functional transfer training, LE strengthening/ROM, Therapeutic exercise, Endurance training, Patient/family training, Equipment eval/education, Bed mobility, Gait training, Spoke to nursing  Equipment Recommended: Viridiana Aquino       See flowsheet documentation for full assessment, interventions and recommendations  Outcome: Progressing  Note: Prognosis: Good  Problem List: Decreased endurance, Decreased mobility, Impaired judgement, Decreased strength  Assessment: Pt  showed good progress in overall mobility this PT session  pt  needed no hands on assist for mobility except ascending steps with slightly increased effort and time  Pt  able to ambulate increased distance with RW and cues provided consistently to take longer strides and and increase heel strike  Pt  reported "I was never a big step person  I always took small steps"  Pt  is below her PLOF as patient was ambulating without AD and currently recommending patient use RW for ambulation due to decreased strength and endurance  Pt  reported she has RW,WC at home from her sister who passed away  Not sure if the RW would be a good fit for the patient  Pt  reported her  will be able to help her at home because "he is an energizer bunny  He put young people to shame"  talked to CM and attending PT Joaquin Leroy that patient has made good progress with mobility and DC recommendation could change to Home PT if patient continue to consisitent progress with her mobility and endurance  Continued skilled PT recommend at this time to address the endurance and mobility deficits  Barriers to Discharge: None     PT Discharge Recommendation: Other (Comment)(rehab vs Home PT)     PT - OK to Discharge: Yes    See flowsheet documentation for full assessment

## 2021-02-12 NOTE — CASE MANAGEMENT
Briana spoke with pt she would prefer to return home with CARLI  Spoke with daughter Cesilia Guadalupe and spouse Manuelito Tayler advise [facility] has a bed available however pt is refusing rehab at this time  Per Manuelito Lester he is unable to provide much assistance at home and would prefer if pt has STR prior to returning home  Manuelito Lester will call and speak with wife, advised if pt insists on going home we have to respect her decision as provider feels she has capacity to make her own decisions  BRIANA spoke with pt and she is now agreeable to rehab at [facility]  Spoke with Liz Solo in admissions and she will contact daughter to have paperwork completed  Pt can admit tomorrow, [facility] transport scheduled with chelsea for noon tomorrow

## 2021-02-12 NOTE — CASE MANAGEMENT
CM spoke to pts daughter Lisa Westbrook  She let me know 5 facilities they prefer for STR  Referrals placed in [facility]

## 2021-02-12 NOTE — PHYSICAL THERAPY NOTE
Physical Therapy Treatment Note     02/12/21 1008   PT Last Visit   PT Visit Date 02/12/21   Note Type   Note Type Treatment   Pain Assessment   Pain Assessment Tool 0-10   Pain Score 3   Pain Location/Orientation Orientation: Left; Location: Arm   Restrictions/Precautions   Weight Bearing Precautions Per Order No   Other Precautions Fall Risk;Telemetry;Pain   General   Chart Reviewed Yes   Family/Caregiver Present No   Cognition   Overall Cognitive Status WFL   Subjective   Subjective Pt  reported pain in her L arm elbow area  Reported "it is arthritis    I have ahd it for a while now  Pt  agreeable to PT  Transfers   Sit to Stand 5  Supervision   Additional items Armrests;Assist x 1;Verbal cues   Stand to Sit 5  Supervision   Additional items Armrests;Assist x 1   Stand pivot 5  Supervision   Ambulation/Elevation   Gait pattern Excessively slow; Short stride;Decreased foot clearance; Forward Flexion   Gait Assistance 5  Supervision   Additional items Assist x 1;Verbal cues   Assistive Device Rolling walker   Distance 60ft x 2, 110ft x 2   Stair Management Assistance   (CGA/CS)   Additional items Assist x 1;Verbal cues; Increased time required   Stair Management Technique One rail R;Step to pattern; Foreward; Sideways;Nonreciprocal   Number of Stairs 4   Balance   Static Sitting Fair +   Ambulatory Fair -   Endurance Deficit   Endurance Deficit Yes   Endurance Deficit Description Fatigue   Activity Tolerance   Activity Tolerance Patient limited by fatigue;Patient tolerated treatment well   Assessment   Prognosis Good   Problem List Decreased endurance;Decreased mobility; Impaired judgement;Decreased strength   Assessment Pt  showed good progress in overall mobility this PT session  pt  needed no hands on assist for mobility except ascending steps with slightly increased effort and time  Pt  able to ambulate increased distance with RW and cues provided consistently to take longer strides and and increase heel strike   Pt  reported "I was never a big step person  I always took small steps"  Pt  is below her PLOF as patient was ambulating without AD and currently recommending patient use RW for ambulation due to decreased strength and endurance  Pt  reported she has RW,WC at home from her sister who passed away  Not sure if the RW would be a good fit for the patient  Pt  reported her  will be able to help her at home because "he is an energizer bunny  He put young people to shame"  talked to CM and attending PT Yakelin York that patient has made good progress with mobility and DC recommendation could change to Home PT if patient continue to consisitent progress with her mobility and endurance  Continued skilled PT recommend at this time to address the endurance and mobility deficits  Barriers to Discharge None   Goals   Patient Goals go home    STG Expiration Date 02/15/21   PT Treatment Day 1   Plan   Treatment/Interventions Elevations; Functional transfer training;Patient/family training;Equipment eval/education;Gait training;Spoke to nursing;Spoke to case management;OT   Progress Progressing toward goals   PT Frequency Other (Comment)  (3-5x week)   Recommendation   PT Discharge Recommendation Other (Comment)  (rehab vs Home PT)   Equipment Recommended Walker   PT - OK to Discharge Yes         Celestina Patel, PTA

## 2021-02-12 NOTE — PROGRESS NOTES
Progress Note - López Bailey 1940, 79 y o  female MRN: [id number]    Unit/Bed#: [facility] 531-01 Encounter: [id number]    Primary Care Provider: Celena Manrique MD   Date and time admitted to hospital: 1/29/2021  3:37 PM        * A-fib [facility])  Assessment & Plan  · Remains in SR, rate controlled, amiodarone 200 mg daily per  EP  · EP/cardiology on board  · Cont metoprolol    · A/c with xarelto        Hyperlipidemia  Assessment & Plan  · Continue statin    Seizure-like activity ([facility] )  Assessment & Plan  · Rapid response initiated while pt was at [facility] d/t seizure like activity  · S/p Neurology consult  · S/p EEG  · Not sure if able to have MRI as positive PPM; s/p CT head  · On keppra BID  · Sz precautions  · PENNDOT filed per neurology  · No recurring sz like activity  ·       Urinary retention  Assessment & Plan  Voiding trial today with urinary retention protocol in place  Thus far has been voiding   Pt c/o pyuria but unsure if may have been related to positioning dominguez catheter as once adjusted symptoms resolved  Regardless + pyuria, + VRE in urine cx dated 1/22; sens to tetracycline, changed abx to doxycycline x 7 days  Patient was complaining of new onset of dysuria since last night  Felt like previous urinary tract infection    Will obtain a UA and follow-up on culture-  Follow-up on the cultures  Continue with doxycycline to finish the course    Hyponatremia  Assessment & Plan  Secondary to volume overload,   Resolved    Dysphagia  Assessment & Plan  · Resolved; s/p speech/GI eval 2/2; cleared for upgrade of diet to regular     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[facility] )  Assessment & Plan  · + Pacemaker in place, 1/19/2021    CAD (coronary artery disease)  Assessment & Plan  S/p cardiac cath/PCI/LAMINE x 2 1/11/2021  On effient, per cards on past admission d/c of asa given on xarelto as well  Pt is CP free    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)  Assessment & Plan  Wt Readings from Last 3 Encounters:   02/10/21 58 6 kg (129 lb 3 oz)   21 70 kg (154 lb 4 8 oz)   21 63 7 kg (140 lb 6 4 oz)   Acute on chronic systolic/diastolic HF, ef of 88%; possibly precipitated by afib with rvr  Cardiology on board, transitioned to po starting   Cont I/O, daily wts  Good diuretic response  Patient cleared from Cardiology for discharge        Essential hypertension  Assessment & Plan  · Stable        VTE Pharmacologic Prophylaxis:   Pharmacologic: Rivaroxaban (Xarelto)  Mechanical VTE Prophylaxis in Place: Yes    Patient Centered Rounds: I have performed bedside rounds with nursing staff today  Discussions with Specialists or Other Care Team Provider:     Education and Discussions with Family / Patient: daughter    Time Spent for Care: 30 minutes  More than 50% of total time spent on counseling and coordination of care as described above  Current Length of Stay: 13 day(s)    Current Patient Status: Inpatient   Certification Statement: The patient will continue to require additional inpatient hospital stay due to pending rehab    Discharge Plan:     Code Status: Level 3 - DNAR and DNI      Subjective:   Patient seen and examined    Objective:     Vitals:   Temp (24hrs), Av °F (36 7 °C), Min:97 8 °F (36 6 °C), Max:98 2 °F (36 8 °C)    Temp:  [97 8 °F (36 6 °C)-98 2 °F (36 8 °C)] 97 8 °F (36 6 °C)  HR:  [85] 85  Resp:  [15-18] 18  BP: (111-143)/(58-83) 143/58  SpO2:  [98 %] 98 %  Body mass index is 27 kg/m²  Input and Output Summary (last 24 hours): Intake/Output Summary (Last 24 hours) at 2021  Last data filed at 2021 0900  Gross per 24 hour   Intake 120 ml   Output 200 ml   Net -80 ml       Physical Exam:     Physical Exam  Constitutional:       General: She is not in acute distress  Appearance: Normal appearance  She is not ill-appearing  HENT:      Head: Normocephalic and atraumatic        Nose: Nose normal    Eyes:      General: No scleral icterus  Cardiovascular:      Rate and Rhythm: Normal rate and regular rhythm  Pulses: Normal pulses  Pulmonary:      Effort: Pulmonary effort is normal       Comments: Decreased breath sounds bilateral  Abdominal:      General: Abdomen is flat  There is no distension  Genitourinary:     Comments: Groin candiasis  Musculoskeletal: Normal range of motion  Neurological:      General: No focal deficit present  Mental Status: She is alert  Additional Data:     Labs:    Results from last 7 days   Lab Units 02/10/21  0444   WBC Thousand/uL 6 33   HEMOGLOBIN g/dL 10 0*   HEMATOCRIT % 31 4*   PLATELETS Thousands/uL 221     Results from last 7 days   Lab Units 02/10/21  0444   POTASSIUM mmol/L 3 3*   CHLORIDE mmol/L 98*   CO2 mmol/L 34*   BUN mg/dL 9   CREATININE mg/dL 0 72   CALCIUM mg/dL 9 0   ALK PHOS U/L 71   ALT U/L 31   AST U/L 44           * I Have Reviewed All Lab Data Listed Above  * Additional Pertinent Lab Tests Reviewed:  Jason [facility] Admission Reviewed    Imaging:    Imaging Reports Reviewed Today Include:   Imaging Personally Reviewed by Myself Includes:      Recent Cultures (last 7 days):     Results from last 7 days   Lab Units 02/10/21  1345   URINE CULTURE  <10,000 cfu/ml        Last 24 Hours Medication List:   Current Facility-Administered Medications   Medication Dose Route Frequency Provider Last Rate    acetaminophen  650 mg Oral Q6H PRN Tay Bravo PA-C      amiodarone  200 mg [facility] Daily [facility] [address] [facility]      ammonium lactate   Topical BID PRN Felipe Rasmussen MD      atorvastatin  20 mg Oral Daily With [employer]      bumetanide  2 mg Oral Daily Amelia Montoya MD      diphenhydrAMINE-zinc acetate   Topical TID PRN Carlos Garcia PA-C      doxycycline hyclate  100 mg Oral Q12H [address] Mirta Bernal DO      hydrALAZINE  5 mg Intravenous Q6H PRN Tay Bravo PA-C      levETIRAcetam  500 mg Oral Q12H [address] Eugene Anderson, PA-C      lidocaine (PF)  5 mL Infiltration Once Eugene Anderson, PA-C      magnesium oxide  400 mg Oral BID before lunch/dinner Hammett Dundee, DO      melatonin  3 mg Oral HS PRN Hammett Dundee, DO      metoprolol succinate  25 mg Oral Daily Anthony Friend, [place]      nystatin   Topical BID Anali Bee MD      ondansetron  4 mg Intravenous Q6H PRN Eugene Anderson, PA-KATHIE      oxyCODONE  2 5 mg Oral Q4H PRN Eugene Anderson, PA-C      pantoprazole  40 mg Oral Early Morning Hershall Sides, CRNP      potassium chloride  40 mEq Oral Daily Hammett Dundee, DO      prasugrel  10 mg Oral Daily Eugene Anderson, PA-C      psyllium  1 packet Oral Daily Eugene Anderson, PA-C      rivaroxaban  15 mg Oral Daily With [employer], PA-C      saccharomyces boulardii  250 mg Oral BID Eugene Anderson, PA-C          Today, Patient Was Seen By: Anali Bee MD    ** Please Note: Dictation voice to text software may have been used in the creation of this document   **

## 2021-02-13 ENCOUNTER — TELEPHONE (OUTPATIENT)
Dept: OTHER | Facility: OTHER | Age: 81
End: 2021-02-13

## 2021-02-13 VITALS
HEART RATE: 82 BPM | SYSTOLIC BLOOD PRESSURE: 128 MMHG | TEMPERATURE: 98.4 F | WEIGHT: 128.31 LBS | OXYGEN SATURATION: 96 % | RESPIRATION RATE: 19 BRPM | BODY MASS INDEX: 26.82 KG/M2 | DIASTOLIC BLOOD PRESSURE: 59 MMHG

## 2021-02-13 PROBLEM — R13.10 DYSPHAGIA: Status: RESOLVED | Noted: 2021-01-21 | Resolved: 2021-02-13

## 2021-02-13 PROCEDURE — 99239 HOSP IP/OBS DSCHRG MGMT >30: CPT | Performed by: FAMILY MEDICINE

## 2021-02-13 RX ORDER — ACETAMINOPHEN 325 MG/1
650 TABLET ORAL EVERY 6 HOURS PRN
Refills: 0
Start: 2021-02-13

## 2021-02-13 RX ORDER — POTASSIUM CHLORIDE 20 MEQ/1
40 TABLET, EXTENDED RELEASE ORAL DAILY
Refills: 0 | Status: ON HOLD
Start: 2021-02-14 | End: 2021-02-23 | Stop reason: SDUPTHER

## 2021-02-13 RX ORDER — PRASUGREL 10 MG/1
10 TABLET, FILM COATED ORAL DAILY
Refills: 0 | Status: ON HOLD
Start: 2021-02-14 | End: 2021-02-23 | Stop reason: SDUPTHER

## 2021-02-13 RX ORDER — NYSTATIN 100000 [USP'U]/G
POWDER TOPICAL 2 TIMES DAILY
Qty: 15 G | Refills: 0
Start: 2021-02-13 | End: 2021-02-23 | Stop reason: HOSPADM

## 2021-02-13 RX ORDER — BUMETANIDE 2 MG/1
2 TABLET ORAL DAILY
Refills: 0 | Status: ON HOLD
Start: 2021-02-14 | End: 2021-02-23 | Stop reason: SDUPTHER

## 2021-02-13 RX ORDER — OXYCODONE HYDROCHLORIDE 5 MG/1
2.5 TABLET ORAL EVERY 4 HOURS PRN
Qty: 5 TABLET | Refills: 0 | Status: SHIPPED | OUTPATIENT
Start: 2021-02-13 | End: 2021-02-18

## 2021-02-13 RX ORDER — AMMONIUM LACTATE 12 G/100G
LOTION TOPICAL 2 TIMES DAILY PRN
Qty: 400 G | Refills: 0
Start: 2021-02-13 | End: 2021-02-23 | Stop reason: HOSPADM

## 2021-02-13 RX ORDER — LEVETIRACETAM 500 MG/1
500 TABLET ORAL EVERY 12 HOURS SCHEDULED
Refills: 0 | Status: ON HOLD
Start: 2021-02-13 | End: 2021-02-23 | Stop reason: SDUPTHER

## 2021-02-13 RX ORDER — SACCHAROMYCES BOULARDII 250 MG
250 CAPSULE ORAL 2 TIMES DAILY
Refills: 0
Start: 2021-02-13 | End: 2021-02-23 | Stop reason: HOSPADM

## 2021-02-13 RX ORDER — METOPROLOL SUCCINATE 25 MG/1
25 TABLET, EXTENDED RELEASE ORAL DAILY
Refills: 0 | Status: ON HOLD
Start: 2021-02-14 | End: 2021-02-23 | Stop reason: SDUPTHER

## 2021-02-13 RX ORDER — DOXYCYCLINE HYCLATE 100 MG/1
100 CAPSULE ORAL EVERY 12 HOURS SCHEDULED
Qty: 2 CAPSULE | Refills: 0
Start: 2021-02-13 | End: 2021-02-14

## 2021-02-13 RX ORDER — AMIODARONE HYDROCHLORIDE 200 MG/1
200 TABLET ORAL
Refills: 0 | Status: ON HOLD
Start: 2021-02-14 | End: 2021-02-23 | Stop reason: SDUPTHER

## 2021-02-13 RX ADMIN — PRASUGREL HYDROCHLORIDE 10 MG: 10 TABLET, FILM COATED ORAL at 09:00

## 2021-02-13 RX ADMIN — LEVETIRACETAM 500 MG: 500 TABLET, FILM COATED ORAL at 09:00

## 2021-02-13 RX ADMIN — ONDANSETRON 4 MG: 2 INJECTION INTRAMUSCULAR; INTRAVENOUS at 11:41

## 2021-02-13 RX ADMIN — PANTOPRAZOLE SODIUM 40 MG: 40 TABLET, DELAYED RELEASE ORAL at 06:59

## 2021-02-13 RX ADMIN — METOPROLOL SUCCINATE 25 MG: 25 TABLET, EXTENDED RELEASE ORAL at 09:00

## 2021-02-13 RX ADMIN — AMIODARONE HYDROCHLORIDE 200 MG: 200 TABLET ORAL at 06:59

## 2021-02-13 RX ADMIN — POTASSIUM CHLORIDE 40 MEQ: 1500 TABLET, EXTENDED RELEASE ORAL at 09:00

## 2021-02-13 RX ADMIN — DOXYCYCLINE 100 MG: 100 CAPSULE ORAL at 09:00

## 2021-02-13 RX ADMIN — NYSTATIN: 100000 POWDER TOPICAL at 09:01

## 2021-02-13 RX ADMIN — BUMETANIDE 2 MG: 2 TABLET ORAL at 09:00

## 2021-02-13 RX ADMIN — Medication 250 MG: at 09:00

## 2021-02-13 NOTE — PROGRESS NOTES
Progress Note - Jordan Kumar 1940, 79 y o  female MRN: [id number]    Unit/Bed#: [facility] 531-01 Encounter: [id number]    Primary Care Provider: Ranjeet English MD   Date and time admitted to hospital: 1/29/2021  3:37 PM        * A-fib [facility])  Assessment & Plan  · Remains in SR, rate controlled, amiodarone 200 mg daily per  EP  · EP/cardiology on board  · Cont metoprolol    · A/c with xarelto        Hyperlipidemia  Assessment & Plan  · Continue statin    Seizure-like activity ([facility] [address] )  Assessment & Plan  · Rapid response initiated while pt was at [facility] d/t seizure like activity  · S/p Neurology consult  · S/p EEG  · Not sure if able to have MRI as positive PPM; s/p CT head  · On keppra BID  · Sz precautions  · PENNDOT filed per neurology  · No recurring sz like activity  ·       Urinary retention  Assessment & Plan  Voiding trial today with urinary retention protocol in place  Thus far has been voiding   Pt c/o pyuria but unsure if may have been related to positioning dominguez catheter as once adjusted symptoms resolved  Regardless + pyuria, + VRE in urine cx dated 1/22; sens to tetracycline, changed abx to doxycycline x 7 days  Patient was complaining of new onset of dysuria since last night  Felt like previous urinary tract infection    Will obtain a UA and follow-up on culture-  Follow-up on the cultures-no growth  Continue with doxycycline to finish the course- one more day  Patient with persistent dysuria -  Diflucan ,    Dysphagia  Assessment & Plan  · Resolved; s/p speech/GI eval 2/2; cleared for upgrade of diet to regular     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[address] )  Assessment & Plan  · + Pacemaker in place, 1/19/2021    CAD (coronary artery disease)  Assessment & Plan  S/p cardiac cath/PCI/LAMINE x 2 1/11/2021  On effient, per cards on past admission d/c of asa given on xarelto as well  Pt is CP free    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)  Assessment & Plan  Wt Readings from Last 3 Encounters: 21 58 2 kg (128 lb 4 9 oz)   21 70 kg (154 lb 4 8 oz)   21 63 7 kg (140 lb 6 4 oz)   Acute on chronic systolic/diastolic HF, ef of 82%; possibly precipitated by afib with rvr  Cardiology on board, transitioned to po starting   Cont I/O, daily wts  Good diuretic response  Patient cleared from Cardiology for discharge        Essential hypertension  Assessment & Plan  · Stable    Hyponatremia-resolved as of 2021  Assessment & Plan  Secondary to volume overload,   Resolved        VTE Pharmacologic Prophylaxis:   Pharmacologic: Apixaban (Eliquis)  Mechanical VTE Prophylaxis in Place: Yes    Patient Centered Rounds: I have performed bedside rounds with nursing staff today  Discussions with Specialists or Other Care Team Provider:     Education and Discussions with Family / Patient: patient    Time Spent for Care: 30 minutes  More than 50% of total time spent on counseling and coordination of care as described above  Current Length of Stay: 14 day(s)    Current Patient Status: Inpatient   Certification Statement: The patient will continue to require additional inpatient hospital stay due to pending placement    Discharge Plan:     Code Status: Level 3 - DNAR and DNI      Subjective:   Patient seen and examined, still with dysuria, itching improving    Objective:     Vitals:   Temp (24hrs), Av 2 °F (36 8 °C), Min:98 1 °F (36 7 °C), Max:98 2 °F (36 8 °C)    Temp:  [98 1 °F (36 7 °C)-98 2 °F (36 8 °C)] 98 2 °F (36 8 °C)  HR:  [82-88] 82  Resp:  [16-18] 16  BP: ()/(47-57) 95/47  SpO2:  [96 %] 96 %  Body mass index is 26 82 kg/m²  Input and Output Summary (last 24 hours): Intake/Output Summary (Last 24 hours) at 2021 2131  Last data filed at 2021 1501  Gross per 24 hour   Intake 480 ml   Output 200 ml   Net 280 ml       Physical Exam:     Physical Exam  Constitutional:       General: She is not in acute distress  HENT:      Head: Normocephalic        Nose: Nose normal    Neck:      Musculoskeletal: Normal range of motion  Cardiovascular:      Rate and Rhythm: Normal rate and regular rhythm  Pulses: Normal pulses  Pulmonary:      Effort: Pulmonary effort is normal    Musculoskeletal: Normal range of motion  Skin:     General: Skin is warm  Neurological:      Mental Status: She is alert  Additional Data:     Labs:    Results from last 7 days   Lab Units 02/12/21  0545   WBC Thousand/uL 7 26   HEMOGLOBIN g/dL 10 1*   HEMATOCRIT % 32 0*   PLATELETS Thousands/uL 233     Results from last 7 days   Lab Units 02/12/21  0545 02/10/21  0444   POTASSIUM mmol/L 3 9 3 3*   CHLORIDE mmol/L 102 98*   CO2 mmol/L 34* 34*   BUN mg/dL 12 9   CREATININE mg/dL 0 74 0 72   CALCIUM mg/dL 9 3 9 0   ALK PHOS U/L  --  71   ALT U/L  --  31   AST U/L  --  44           * I Have Reviewed All Lab Data Listed Above  * Additional Pertinent Lab Tests Reviewed:  All Labs Within Last 24 Hours Reviewed    Imaging:    Imaging Reports Reviewed Today Include:   Imaging Personally Reviewed by Myself Includes:      Recent Cultures (last 7 days):     Results from last 7 days   Lab Units 02/10/21  1345   URINE CULTURE  <10,000 cfu/ml        Last 24 Hours Medication List:   Current Facility-Administered Medications   Medication Dose Route Frequency Provider Last Rate    acetaminophen  650 mg Oral Q6H PRN Melinda Acosta PA-C      amiodarone  200 mg Oral Daily With [address] [facility], CARLY      ammonium lactate   Topical BID PRN Ronn Hernandez MD      atorvastatin  20 mg Oral Daily With [employer]      bumetanide  2 mg Oral Daily Litzy Mccall MD      diphenhydrAMINE-zinc acetate   Topical TID PRN Geronimo Greenberg PA-C      doxycycline hyclate  100 mg Oral Q12H [facility] Radha Anderson DO      hydrALAZINE  5 mg Intravenous Q6H PRN Melinda Acosta PA-C      levETIRAcetam  500 mg Oral Q12H [facility] Melinda Acosta PA-C      lidocaine (PF)  5 mL Infiltration Once Kelly Pozo, CARLY      magnesium oxide  400 mg Oral BID before lunch/dinner Keo Siddiqi, DO      melatonin  3 mg Oral HS PRN Keo Evens, DO      metoprolol succinate  25 mg Oral Daily Anhtony Friend, [place]      nystatin   Topical BID Carla Mendez MD      ondansetron  4 mg Intravenous Q6H PRN Finas Mins, CARLY      oxyCODONE  2 5 mg Oral Q4H PRN Finas Mins, CARLY      pantoprazole  40 mg Oral Early Morning KATHY Price      potassium chloride  40 mEq Oral Daily Keo Siddiqi,       prasugrel  10 mg Oral Daily Kelly Pozo, CARLY      psyllium  1 packet Oral Daily [facility] [place]      rivaroxaban  15 mg Oral Daily With [employer], CARLY      saccharomyces boulardii  250 mg Oral BID Kelly Pozo, CARLY          Today, Patient Was Seen By: Carla Mendez MD    ** Please Note: Dictation voice to text software may have been used in the creation of this document   **

## 2021-02-13 NOTE — NURSING NOTE
Spoke with  about personal effects such as flowers and balloons and cards he will  after his visit to [facility]

## 2021-02-13 NOTE — DISCHARGE INSTRUCTIONS
CBC BMP in a week  Check weight daily    If the weight increases more than 3 lb or lower extremity with edema or shortness of breath call Cardiology office

## 2021-02-13 NOTE — ASSESSMENT & PLAN NOTE
Voiding trial today with urinary retention protocol in place  Thus far has been voiding   Pt c/o pyuria but unsure if may have been related to positioning dominguez catheter as once adjusted symptoms resolved  Regardless + pyuria, + VRE in urine cx dated 1/22; sens to tetracycline, changed abx to doxycycline x 7 days  Patient was complaining of new onset of dysuria since last night  Felt like previous urinary tract infection    Will obtain a UA and follow-up on culture-  Follow-up on the cultures-no growth  Continue with doxycycline to finish the course- one more day  Patient with persistent dysuria -  Diflucan ,

## 2021-02-13 NOTE — ASSESSMENT & PLAN NOTE
Wt Readings from Last 3 Encounters:   02/12/21 58 2 kg (128 lb 4 9 oz)   01/27/21 70 kg (154 lb 4 8 oz)   01/21/21 63 7 kg (140 lb 6 4 oz)   Acute on chronic systolic/diastolic HF, ef of 22%; possibly precipitated by afib with rvr  Cardiology on board, transitioned to po starting   Cont I/O, daily wts  Good diuretic response  Patient cleared from Cardiology for discharge

## 2021-02-13 NOTE — ASSESSMENT & PLAN NOTE
· Rapid response initiated while pt was at [facility] d/t seizure like activity  · S/p Neurology consult  · S/p EEG  · Not sure if able to have MRI as positive PPM; s/p CT head  · On keppra BID  · Sz precautions  · PENNDOT filed per neurology  · No recurring sz like activity  ·

## 2021-02-14 NOTE — ASSESSMENT & PLAN NOTE
Wt Readings from Last 3 Encounters:   02/12/21 58 2 kg (128 lb 4 9 oz)   01/27/21 70 kg (154 lb 4 8 oz)   01/21/21 63 7 kg (140 lb 6 4 oz)   Acute on chronic systolic/diastolic HF, ef of 76%; possibly precipitated by afib with rvr  Cardiology on board, continue with Bumex  Good diuretic response  Patient cleared from Cardiology for discharge

## 2021-02-14 NOTE — ASSESSMENT & PLAN NOTE
Patient needed Saeed catheter placement during the hospitalization for urinary retention  Successfully passed voiding trial   Patient will finish the course of antibiotics for urinary tract infection  Patient was also complaining of dysuria and felt to be secondary to yeast infection and patient was given 1 time dose of Diflucan with improvement in her symptoms    Continue with the nystatin powder

## 2021-02-14 NOTE — ASSESSMENT & PLAN NOTE
· Remains in SR, rate controlled, amiodarone 200 mg daily per  EP  · EP/cardiology on board  · Continue with metoprolol and  xarelto

## 2021-02-14 NOTE — DISCHARGE SUMMARY
Discharge- Jordan Kumar 1940, 79 y o  female MRN: [id number]    Unit/Bed#: [facility] 531-01 Encounter: [id number]    Primary Care Provider: Ranjeet English MD   Date and time admitted to hospital: 1/29/2021  3:37 PM        * A-fib [facility])  Assessment & Plan  · Remains in SR, rate controlled, amiodarone 200 mg daily per  EP  · EP/cardiology on board  · Continue with metoprolol and  xarelto        Hyperlipidemia  Assessment & Plan  · Continue statin    Seizure-like activity ([facility] [address] )  Assessment & Plan  · Rapid response initiated while pt was at [facility] d/t seizure like activity  · S/p Neurology consult  · S/p EEGThis is an abnormal 29 minutes awake and drowsy EEG due to intermittent diffuse delta activity and left temporal delta activity  These findings may indicate mild diffuse cerebral dysfunction with superimposed left temporal focal cerebral dysfunction  · Continue with Keppra  · Follow-up with neurology as an outpatient      Urinary retention  Assessment & Plan  Patient needed Saeed catheter placement during the hospitalization for urinary retention  Successfully passed voiding trial   Patient will finish the course of antibiotics for urinary tract infection  Patient was also complaining of dysuria and felt to be secondary to yeast infection and patient was given 1 time dose of Diflucan with improvement in her symptoms  Continue with the nystatin powder    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HCC)  Assessment & Plan  · + Pacemaker in place, 1/19/2021    CAD (coronary artery disease)  Assessment & Plan  S/p cardiac cath/PCI/LAMINE x 2 1/11/2021  On effient, per cards on past admission d/c of asa given on xarelto as well  Pt is CP free  Patient was taken off of the Imdur and Ranexa at during the hospitalization    Follow-up with cardiology as an outpatient    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[facility] [address] )  Assessment & Plan  Wt Readings from Last 3 Encounters:   02/12/21 58 2 kg (128 lb 4 9 oz)   01/27/21 70 kg (154 lb 4 8 oz)   01/21/21 63 7 kg (140 lb 6 4 oz)   Acute on chronic systolic/diastolic HF, ef of 48%; possibly precipitated by afib with rvr  Cardiology on board, continue with Bumex  Good diuretic response  Patient cleared from Cardiology for discharge        Essential hypertension  Assessment & Plan  · Continue with metoprolol      Discharging Physician / Practitioner: Min Young MD  PCP: Celena Manrique MD  Admission Date:   Admission Orders (From admission, onward)     Ordered        01/29/21 1547  Inpatient Admission  Once                   Discharge Date: 02/13/21    Disposition:      Short Term Rehab or SNF at [facility]    For Discharges to [facility] SN[facility]:   · [address] Texted SNF Physician    Reason for Admission:  Seizure activity    Discharge Diagnoses:     Please see assessment and plan section above for further details regarding discharge diagnoses  Resolved Problems  Date Reviewed: 2/13/2021          Resolved    Dysphagia 2/13/2021     Resolved by  Min Young MD    Hyponatremia 2/12/2021     Resolved by  Min Young MD    Anemia 2/1/2021     Resolved by  Santo Ordoñez PA-C    Hypotension due to drugs 2/1/2021     Resolved by  Santo Ordoñez PA-C          Consultations During Hospital Stay:  · Cardiology  · Neurology  · Gastroenterology    Procedures Performed:   EEG-This is an abnormal 29 minutes awake and drowsy EEG due to intermittent diffuse delta activity and left temporal delta activity  · These findings may indicate mild diffuse cerebral dysfunction with superimposed left temporal focal cerebral dysfunction    Medication Adjustments and Discharge Medications:  · Summary of Medication Adjustments made as a result of this hospitalization:   · Medication Dosing Tapers - Please refer to Discharge Medication List for details on any medication dosing tapers (if applicable to patient)    · Medications being temporarily held (include recommended restart time):   · Discharge Medication List: See after visit summary for reconciled discharge medications  Wound Care Recommendations:  When applicable, please see wound care section of After Visit Summary  Diet Recommendations at Discharge:  Diet -     Instructions for any Catheters / Lines Present at Discharge (including removal date, if applicable):     Significant Findings / Test Results:   · Upper extremity Doppler-no DVT  · Chest X-mild pulmonary edema  Right-sided PICC line in the region of the [address] junction  · CT stroke alert-no acute intracranial abnormality microangiopathic changes    Incidental Findings:   ·     Test Results Pending at Discharge (will require follow up):   ·      Outpatient Tests Requested:  · CBC BMP in a week    Complications:   none    Hospital Course:     Juany New is a 79 y o  female patient who originally presented to the hospital on 1/29/2021 due to seizure activity  Patient was recently admitted to the hospital from 1/17 -1/21 with chest pain and elevated troponin and underwent cardiac catheterization and stent placement  Later patient developed the new onset of atrial fibrillation and tachy-jillian syndrome and had pacemaker placement on 1/19  This was also complicated with the pneumonia which was treated with antibiotics and patient was eventually discharged to acute rehab on 1/21/2021 and while in acute rehab patient developed seizure-like activity and was a rapid response called for the altered mental status and at that time patient was moved to the medical floor  Neurology evaluated the patient  Patient had a stroke alert CT scan done which was negative for any acute abnormality  And patient was started on Keppra and Ativan  Patient was evaluated by critical care due to persistent encephalopathy  Patient was under critical care service due to hypotension and encephalopathy    Patient also developed acute respiratory insufficiency secondary to flash pulmonary edema and patient was on BiPAP  Patient was eventually weaned down to nasal cannula and finally to room air  Mental status improved and patient was transferred out of the critical care unit on 02/01  Cardiology and electrophysiology followed the patient during the hospitalization due to tachycardia patient was started on amiodarone  Patient finished amiodarone loading in the hospital and currently on amiodarone 200 mg daily and patient will be anticoagulated with Xarelto  Patient was also treated for urinary tract infection and she need 1 more day of antibiotics with doxycycline  Patient was complaining of dysuria and she was started with Diflucan with improvement in her symptoms, cardiology followed the patient during the hospitalization and cleared for discharge with outpatient follow-up    PT OT evaluated the patient and recommended rehabilitation and patient will be discharged to skilled nursing facility in a stable condition on 2/13/2021  This patient had a prolonged and complicated hospital stay for details refer to the chart    Condition at Discharge: good     Discharge Day Visit / Exam:     Subjective:  Patient seen and examined  No specific complaints offered  Vitals: Blood Pressure: 128/59 (02/13/21 0700)  Pulse: 82 (02/12/21 0607)  Temperature: 98 4 °F (36 9 °C) (02/13/21 0700)  Temp Source: Oral (02/13/21 0700)  Respirations: 19 (02/12/21 2318)  Weight - Scale: 58 2 kg (128 lb 4 9 oz) (02/12/21 0545)  SpO2: 96 % (02/12/21 2625)  Exam:   Physical Exam  Constitutional:       General: She is not in acute distress  Appearance: Normal appearance  HENT:      Head: Normocephalic and atraumatic  Nose: Nose normal    Eyes:      General: No scleral icterus  Neck:      Musculoskeletal: Normal range of motion  Cardiovascular:      Rate and Rhythm: Normal rate and regular rhythm  Pulses: Normal pulses     Pulmonary:      Effort: Pulmonary effort is normal       Breath sounds: Normal breath sounds  Abdominal:      General: Abdomen is flat  Musculoskeletal: Normal range of motion  Skin:     General: Skin is warm  Neurological:      General: No focal deficit present  Mental Status: She is alert  Discussion with Family:  daughter    Goals of Care Discussions:  · Code Status at Discharge: Prior  · Were there any Goals of Care Discussions during Hospitalization?: No  · Results of any General Goals of Care Discussions:    · POLST Completed: No   · If POLST Completed, Summary of POLST Agreement Provided Here:    · OK to Rehospitalize if Needed? Yes    Discharge instructions/Information to patient and family:   See after visit summary section titled Discharge Instructions for information provided to patient and family  Planned Readmission: none      Discharge Statement:  I spent  45  minutes discharging the patient  This time was spent on the day of discharge  I had direct contact with the patient on the day of discharge  Greater than 50% of the total time was spent examining patient, answering all patient questions, arranging and discussing plan of care with patient as well as directly providing post-discharge instructions  Additional time then spent on discharge activities      ** Please Note: This note has been constructed using a voice recognition system **

## 2021-02-14 NOTE — ASSESSMENT & PLAN NOTE
S/p cardiac cath/PCI/LAMINE x 2 1/11/2021  On effient, per cards on past admission d/c of asa given on xarelto as well  Pt is CP free  Patient was taken off of the Imdur and Ranexa at during the hospitalization    Follow-up with cardiology as an outpatient

## 2021-02-14 NOTE — ASSESSMENT & PLAN NOTE
· Rapid response initiated while pt was at [facility] d/t seizure like activity  · S/p Neurology consult  · S/p EEGThis is an abnormal 29 minutes awake and drowsy EEG due to intermittent diffuse delta activity and left temporal delta activity  These findings may indicate mild diffuse cerebral dysfunction with superimposed left temporal focal cerebral dysfunction  · Continue with Keppra  · Follow-up with neurology as an outpatient

## 2021-02-15 ENCOUNTER — TRANSITIONAL CARE MANAGEMENT (OUTPATIENT)
Dept: FAMILY MEDICINE CLINIC | Facility: MEDICAL CENTER | Age: 81
End: 2021-02-15

## 2021-02-15 ENCOUNTER — PATIENT OUTREACH (OUTPATIENT)
Dept: CASE MANAGEMENT | Facility: HOSPITAL | Age: 81
End: 2021-02-15

## 2021-02-15 ENCOUNTER — TELEPHONE (OUTPATIENT)
Dept: NEUROLOGY | Facility: CLINIC | Age: 81
End: 2021-02-15

## 2021-02-15 ENCOUNTER — NURSING HOME VISIT (OUTPATIENT)
Dept: GERIATRICS | Facility: OTHER | Age: 81
End: 2021-02-15
Payer: MEDICARE

## 2021-02-15 DIAGNOSIS — K59.1 FUNCTIONAL DIARRHEA: ICD-10-CM

## 2021-02-15 DIAGNOSIS — E78.5 HYPERLIPIDEMIA, UNSPECIFIED HYPERLIPIDEMIA TYPE: ICD-10-CM

## 2021-02-15 DIAGNOSIS — R56.9 SEIZURE-LIKE ACTIVITY (HCC): ICD-10-CM

## 2021-02-15 DIAGNOSIS — R74.8 ABNORMAL LIVER ENZYMES: ICD-10-CM

## 2021-02-15 DIAGNOSIS — I25.10 CORONARY ARTERY DISEASE INVOLVING NATIVE CORONARY ARTERY OF NATIVE HEART WITHOUT ANGINA PECTORIS: ICD-10-CM

## 2021-02-15 DIAGNOSIS — I49.5 TACHY-BRADY SYNDROME (HCC): ICD-10-CM

## 2021-02-15 DIAGNOSIS — I10 ESSENTIAL HYPERTENSION: Chronic | ICD-10-CM

## 2021-02-15 DIAGNOSIS — I73.9 PAD (PERIPHERAL ARTERY DISEASE) (HCC): ICD-10-CM

## 2021-02-15 DIAGNOSIS — I50.42 CHRONIC COMBINED SYSTOLIC AND DIASTOLIC CONGESTIVE HEART FAILURE (HCC): ICD-10-CM

## 2021-02-15 DIAGNOSIS — R33.9 URINARY RETENTION: ICD-10-CM

## 2021-02-15 DIAGNOSIS — I48.0 PAROXYSMAL ATRIAL FIBRILLATION (HCC): Primary | ICD-10-CM

## 2021-02-15 DIAGNOSIS — G92.8 TOXIC METABOLIC ENCEPHALOPATHY: ICD-10-CM

## 2021-02-15 PROCEDURE — 99306 1ST NF CARE HIGH MDM 50: CPT | Performed by: FAMILY MEDICINE

## 2021-02-15 NOTE — ASSESSMENT & PLAN NOTE
· Had an episode of U retention in the hospital requiring temporary dominguez catheter placement  · She was treated with antibiotic course for UTI  · Received one time dose of diflucan for yeast infection with improvement of symptoms  · Continue to monitor

## 2021-02-15 NOTE — ASSESSMENT & PLAN NOTE
· Status post cardiac cath with PCI + LAMINE x2 1/11/2021  · Per Cardiology, continue Effient and Xarelto  · Ranexa and imdur discontinued during previous hospitalization  · Follow up with Cardiology 3/15/21

## 2021-02-15 NOTE — ASSESSMENT & PLAN NOTE
· Hx of IBS and ischemic colitis in 2019  · C diff negative in the hospital 1/18  · CT A/P done in the hospital suggestive of infectious colitis  Findings no longer present on repeat CT A/P 1/26  · Per GI, diarrhea likely due to overflow in the setting of large stool burden on CT  Started on bowel regimen  · Recommendation made for follow up as outpatient to plan for colonoscopy

## 2021-02-15 NOTE — ASSESSMENT & PLAN NOTE
Wt Readings from Last 3 Encounters:   02/12/21 58 2 kg (128 lb 4 9 oz)   01/27/21 70 kg (154 lb 4 8 oz)   01/21/21 63 7 kg (140 lb 6 4 oz)     · EF 45% in the hospital, possibly precipitated by afib with RVR  · Continue current diuretic regimen  · Continue to follow with Cardiology

## 2021-02-15 NOTE — ASSESSMENT & PLAN NOTE
· Diffuse PAD with bilateral LE occlusive disease  · Chronic occlusion of R subclavian artery  · Stenosis of L subclavian artery, L carotid artery, celiac artery, bilateral renal arteries,   · ASA discontinued per EP recommendations  · Continue Effient 10 mg daily, lipitor, on Xarelto due to Afib  · Follow up with Dr Avendano Both of Vascular Surgery

## 2021-02-15 NOTE — PROGRESS NOTES
[address]  [address] [facility]  [phone]      [facility]  HISTORY & PHYSICAL        NAME: Marta Bello  AGE: 79 y o  SEX: female  : 1940  DATE OF ENCOUNTER: 02/15/21  POS: 31 (SNF)  PCP: Migdalia Hoffman MD    Chief Complaint     Seen and examined today for admission to short term rehabilitation unit at  [facility]  History of Present Illness     78 yo F with underlying CAD s/p CABG and stents, hypertension, hyperlipidemia, admitted to [facility] originally on 2021 after she presented to the ED with complaints of chest pain and dyspnea  Found to have elevated troponins and congestive heart failure  She underwent cardiac catheterization on 21 with PCI/Stenting x 2 to the L main and LAD  During that hospital stay, she also had RLL PNA felt to be secondary to aspiration/vomiting, for which she completed a course of antibiotics  Course was also complicated by episode of non specific enteritis/colitis as reported per CT scan of the abdomen and pelvis  No diarrhea associated and antibiotics were discontinued  later on she did develop diarrhea, C  Diff negative  Patient had new onset atrial fibrillation during that admission, received amiodarone, loading dose of digoxin and metoprolol and did convert to NSR but was found to have a 4 s pause before conversion  [facility] evaluation  On  she underwent dual chamber PPM implantation  on  patient disharged to [facility] for acute rehab services  Her rehabillitation was limited due to continued loose stools as well as urinary retention, hyponatremia and poor oral intake  On  patient had an episode of seizure like activity  A stroke alert was called  CT H negative for acute intracranial abnormality  Microangiopathic changes  Unable to obtain MRI brain due to recent LAMINE and PPM placement  EEG was abnormal per hospital records   Recommendation was made to continue Keppra and follow up with Neurology as outpatient  She had developed acute delirium with acute respiratory insufficiency secondary to flash pulmonary edema, requiring BiPAP, eventually weaned down to room air  Her mental status improved and patient was transferred out of ICU on 02/01  Cardiology and EP were following the patient during hospitalization due to tachycardia, loaded with amiodarone, now on amiodarone 200 mg po daily as well as anticoagulated with Xarelto  Her hospitalization was also complicated by urinary retention requiring Saeed catheter placement  She completed antibiotic course for UTI and successfully passed a voiding trial  She received one time dose of Diflucan due to concern for secondary yeast infection  Patient was evaluated by PT/OT who recommended subacute rehabillitation services and once considered stable, she was discharged to [facility] for subacute rehab services  She was seen and examined today for admission to short term rehab unit  She is sitting comfortably in chair  Reports feeling well and wishes she was able to go home  States she does not know how long she has been away from home anymore  She denies any pain at this time  Denies any fever/chills, chest pain/shortness of breath, abdominal discomfort, nausea/vomiting, diarrhea/constipation or dysuria  States her diarrhea has now completely resolved  Reports good appetite and good oral intake  Please note, patient had a very lengthy and complex hospitalization  This is merely a summary of hospital records reviewed  For further details, please, kindly refer to hospital chart  Review of Systems     Review of Systems   Constitutional: Positive for fatigue  Negative for activity change, appetite change, fever and unexpected weight change  HENT: Negative for congestion, dental problem, hearing loss and trouble swallowing  Eyes: Negative for visual disturbance     Respiratory: Negative for apnea, cough, choking, chest tightness and shortness of breath  Cardiovascular: Positive for leg swelling  Negative for chest pain and palpitations  Gastrointestinal: Negative for abdominal distention, abdominal pain, constipation, diarrhea, nausea and vomiting  Genitourinary: Negative for difficulty urinating, dysuria and flank pain  Musculoskeletal: Negative for arthralgias and gait problem  Skin: Negative for rash and wound  Psychiatric/Behavioral: Negative for agitation, behavioral problems, confusion and sleep disturbance  All other systems reviewed and are negative  History     Allergies   Allergen Reactions    Sulfa Antibiotics Anaphylaxis    Formaldehyde     Codeine Palpitations       Past Medical History:   Diagnosis Date    Anal fissure     Cardiac disorder     Cognitive changes 12/23/2020    Esophageal reflux     Esophagitis, reflux     Hemorrhoids     Hepatic hemangioma     Last Assessed: 1/13/2015    Herpes zoster     History of colonic polyps     Hypertension     Ischemic colitis ([facility] [address] [facility]     Lumbar herniated disc     Malignant neoplasm without specification of site ([facility] [address] )     Nephrolithiasis     L   Lithotripsy    Nontoxic single thyroid nodule     Last Assessed: 1/13/2015    Osteoarthritis     Overactive bladder     Raynaud disease     Respiratory system disease     Sjogren's disease ([facility] [address] [facility]     Spinal stenosis     PONCHO (stress urinary incontinence, female)     Uterovaginal prolapse     Grade I-II     Past Surgical History:   Procedure Laterality Date    APPENDECTOMY  1947    CARDIAC SURGERY      CABG    CATARACT EXTRACTION Bilateral     COLONOSCOPY  2012    Fiberoptic    COLONOSCOPY      Resolved: 2006 - 2012 5 year f/u    CORONARY ANGIOPLASTY WITH STENT PLACEMENT      CORONARY ARTERY BYPASS GRAFT      Resolved: 2012    ESOPHAGOGASTRODUODENOSCOPY  2012    Diagnostic    HEMORROIDECTOMY      KNEE SURGERY      LITHOTRIPSY      Renal    MALIGNANT SKIN LESION EXCISION      Face; Resolved: 2004    [place] ESOPHAGOGASTRODUODENOSCOPY TRANSORAL DIAGNOSTIC N/A 4/13/2016    Procedure: EGD AND COLONOSCOPY;  Surgeon: Gerard Keller MD;  [facility]: [facility]; Service: Gastroenterology    RENAL ARTERY STENT      SKIN LESION EXCISION      Scalp    SOFT TISSUE TUMOR RESECTION      Shoulder; Resolved: 1995    THROMBOLYSIS      Postoperative Thrombolysis PTCA    TONSILLECTOMY      Resolved: 1944       Family History: non-contributory  Social History     Tobacco Use    Smoking status: Never Smoker    Smokeless tobacco: Never Used   Substance Use Topics    Alcohol use: Not Currently     Frequency: Monthly or less     Comment: social    Drug use: No         She lives with her  Vijay Grant in a ranch home with basement  PTA patient was independent with ADLs and independent with ambulation, without use of assistive device  Objective   Vital Signs   BP:  119/51    HR: 83    T: 97 4°    RR: 19    O2Sat: 99% on RA     W: 127 5 lb    Physical Exam  Vitals signs reviewed  Constitutional:       General: She is not in acute distress  Appearance: She is well-developed  She is not diaphoretic  Comments: Frail and chronically ill appearing  HENT:      Head: Normocephalic and atraumatic  Right Ear: External ear normal       Left Ear: External ear normal       Mouth/Throat:      Pharynx: No oropharyngeal exudate  Eyes:      Conjunctiva/sclera: Conjunctivae normal       Pupils: Pupils are equal, round, and reactive to light  Neck:      Vascular: No JVD  Trachea: No tracheal deviation  Cardiovascular:      Rate and Rhythm: Normal rate and regular rhythm  Heart sounds: Normal heart sounds  No murmur  No friction rub  No gallop  Pulmonary:      Effort: No respiratory distress  Breath sounds: Normal breath sounds  Abdominal:      General: Bowel sounds are normal  There is no distension  Palpations: Abdomen is soft  Tenderness:  There is no abdominal tenderness  Musculoskeletal:         General: No tenderness or deformity  Right lower leg: Edema (trace ankle) present  Left lower leg: Edema (trace ankle) present  Skin:     General: Skin is warm and dry  Coloration: Skin is not pale  Findings: No erythema or rash  Comments: L upper lateral chest surgical incision clean and dry  Neurological:      General: No focal deficit present  Mental Status: She is alert and oriented to person, place, and time  Cranial Nerves: No cranial nerve deficit  Psychiatric:         Mood and Affect: Mood normal          Behavior: Behavior normal          Thought Content: Thought content normal          Judgment: Judgment normal            Pertinent Laboratory/Diagnostic Studies:     Lab Results   Component Value Date    WBC 7 26 02/12/2021    HGB 10 1 (L) 02/12/2021    HCT 32 0 (L) 02/12/2021    MCV 96 02/12/2021     02/12/2021     Lab Results   Component Value Date    GLUCOSE 106 01/29/2021    CALCIUM 9 3 02/12/2021     12/24/2015    K 3 9 02/12/2021    CO2 34 (H) 02/12/2021     02/12/2021    BUN 12 02/12/2021    CREATININE 0 74 02/12/2021     Lab Results   Component Value Date    CTE7KOEFBRVG 1 729 01/08/2021     Lab Results   Component Value Date    HGBA1C 5 5 02/05/2019         Current Medications     All medications reviewed and updated in EMR/Chart  Assessment and Plan     [facility])  · Patient with new onset atrial fibrillation with RVR during this hospital stay  · She was loaded with amiodarone in the hospital    · Now on Amiodarone 200 mg po daily  · Status post PPM placement 1/19  · Continue Metoprolol  · Continue Xarelto  · Follow up with EP and Cardiology  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[facility] [address] )  · Status post PPM placement per EP on 1/19/21  CAD (coronary artery disease)  · Status post cardiac cath with PCI + LAMINE x2 1/11/2021  · Per Cardiology, continue Effient and Xarelto    · Ranexa and imdur discontinued during previous hospitalization  · Follow up with Cardiology 3/15/21  Essential hypertension  · BP well controlled  · Continue Metoprolol  PAD (peripheral artery disease) (HCC)  · Diffuse PAD with bilateral LE occlusive disease  · Chronic occlusion of R subclavian artery  · Stenosis of L subclavian artery, L carotid artery, celiac artery, bilateral renal arteries,   · ASA discontinued per EP recommendations  · Continue Effient 10 mg daily, lipitor, on Xarelto due to Afib  · Follow up with Dr Sheryle Overman of Vascular Surgery  Toxic metabolic encephalopathy  · Improving  · Multifactorial in origin  · Continue supportive care and assistance with all ADLs  Urinary retention  · Had an episode of U retention in the hospital requiring temporary dominguez catheter placement  · She was treated with antibiotic course for UTI  · Received one time dose of diflucan for yeast infection with improvement of symptoms  · Continue to monitor  Seizure-like activity ([facility] [address] )  · Patient had a rapid response called while at [facility] due to seizure like activity, associated with acute change in mental status  · Had an EEG done that was considered abnormal due to diffuse delta activity and L temporal delta activity, findings that may indicate mild diffuse cerebral dysfunction with supperimposed L temporal focal cerebral dysfunction  · Continue Keppra at current dose  · Follow up with Neurology 04/02/2021  Hyperlipidemia  · Continue lipitor  Abnormal liver enzymes  · Elevated liver enzymes during hospital stay  · Trending down, AST and Alk Phos now within normal limits  With mildly elevated total bili  · Followed by GI in the hospital   · Check CMP to monitor  Diarrhea  · Hx of IBS and ischemic colitis in 2019  · C diff negative in the hospital 1/18  · CT A/P done in the hospital suggestive of infectious colitis  Findings no longer present on repeat CT A/P 1/26    · Per GI, diarrhea likely due to overflow in the setting of large stool burden on CT  Started on bowel regimen  · Recommendation made for follow up as outpatient to plan for colonoscopy  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)  Wt Readings from Last 3 Encounters:   02/12/21 58 2 kg (128 lb 4 9 oz)   01/27/21 70 kg (154 lb 4 8 oz)   01/21/21 63 7 kg (140 lb 6 4 oz)     · EF 45% in the hospital, possibly precipitated by afib with RVR  · Continue current diuretic regimen  · Continue to follow with Cardiology  Shazia Gresham MD  Geriatric Medicine  02/15/21    Please note:  Voice-recognition software may have been used in the preparation of this document  Occasional wrong word or "sound-alike" substitutions may have occurred due to the inherent limitations of voice recognition software  Interpretation should be guided by context

## 2021-02-15 NOTE — ASSESSMENT & PLAN NOTE
· Elevated liver enzymes during hospital stay  · Trending down, AST and Alk Phos now within normal limits  With mildly elevated total bili  · Followed by GI in the hospital   · Check CMP to monitor

## 2021-02-15 NOTE — ASSESSMENT & PLAN NOTE
· Patient with new onset atrial fibrillation with RVR during this hospital stay  · She was loaded with amiodarone in the hospital    · Now on Amiodarone 200 mg po daily  · Status post PPM placement 1/19  · Continue Metoprolol  · Continue Xarelto  · Follow up with EP and Cardiology

## 2021-02-15 NOTE — ASSESSMENT & PLAN NOTE
· Patient had a rapid response called while at [facility] due to seizure like activity, associated with acute change in mental status  · Had an EEG done that was considered abnormal due to diffuse delta activity and L temporal delta activity, findings that may indicate mild diffuse cerebral dysfunction with supperimposed L temporal focal cerebral dysfunction  · Continue Keppra at current dose  · Follow up with Neurology 04/02/2021

## 2021-02-15 NOTE — TELEPHONE ENCOUNTER
Called [address] at [phone] and spoke to Phu and scheduled Video Virtual Visit 4/2/2021 with Dr Kellen Gordon in [facility]  SLB/Stroke Alert/Medicare/[employer]  Email: [email or web address]    NOTE FROM CHART:  Reason for Consult / Principal Problem: Stroke alert  Aleshia Barrera will need follow up in in 6 weeks with general attending or advance practitioner  She will not require outpatient neurological testing

## 2021-02-17 ENCOUNTER — TELEPHONE (OUTPATIENT)
Dept: FAMILY MEDICINE CLINIC | Facility: MEDICAL CENTER | Age: 81
End: 2021-02-17

## 2021-02-19 ENCOUNTER — NURSING HOME VISIT (OUTPATIENT)
Dept: GERIATRICS | Facility: OTHER | Age: 81
End: 2021-02-19
Payer: MEDICARE

## 2021-02-19 ENCOUNTER — TELEPHONE (OUTPATIENT)
Dept: FAMILY MEDICINE CLINIC | Facility: MEDICAL CENTER | Age: 81
End: 2021-02-19

## 2021-02-19 DIAGNOSIS — I49.5 TACHY-BRADY SYNDROME (HCC): ICD-10-CM

## 2021-02-19 DIAGNOSIS — E78.5 HYPERLIPIDEMIA, UNSPECIFIED HYPERLIPIDEMIA TYPE: ICD-10-CM

## 2021-02-19 DIAGNOSIS — R30.0 DYSURIA: Primary | ICD-10-CM

## 2021-02-19 DIAGNOSIS — R33.9 URINARY RETENTION: ICD-10-CM

## 2021-02-19 DIAGNOSIS — I48.0 PAROXYSMAL ATRIAL FIBRILLATION (HCC): ICD-10-CM

## 2021-02-19 DIAGNOSIS — G92.8 TOXIC METABOLIC ENCEPHALOPATHY: ICD-10-CM

## 2021-02-19 DIAGNOSIS — I25.10 CORONARY ARTERY DISEASE INVOLVING NATIVE CORONARY ARTERY OF NATIVE HEART WITHOUT ANGINA PECTORIS: ICD-10-CM

## 2021-02-19 DIAGNOSIS — R56.9 SEIZURE-LIKE ACTIVITY (HCC): ICD-10-CM

## 2021-02-19 DIAGNOSIS — I50.42 CHRONIC COMBINED SYSTOLIC AND DIASTOLIC CONGESTIVE HEART FAILURE (HCC): ICD-10-CM

## 2021-02-19 DIAGNOSIS — I10 ESSENTIAL HYPERTENSION: Chronic | ICD-10-CM

## 2021-02-19 DIAGNOSIS — I73.9 PAD (PERIPHERAL ARTERY DISEASE) (HCC): ICD-10-CM

## 2021-02-19 PROCEDURE — 99309 SBSQ NF CARE MODERATE MDM 30: CPT | Performed by: FAMILY MEDICINE

## 2021-02-19 NOTE — ASSESSMENT & PLAN NOTE
·  Diffuse VD with bilateral lower extremity occlusive disease  · Chronic occlusion of right subclavian artery  · Stenosis of left subclavian artery, left carotid  Artery, celiac artery, bilateral renal arteries  · Aspirin was discontinued by EP  · Continue Effient 10 mg daily, Lipitor  · On Xarelto, in the setting of Afib  · Follow up with Dr El Ojeda of Vascular Surgery

## 2021-02-19 NOTE — TELEPHONE ENCOUNTER
Marie from [facility] called to make transition of care appt for pt  She will be discharged from their facility on 2/23/21  Scheduled for 3/9/21      Routed to Clinical - contact pt with LEON questions after she's discharged

## 2021-02-19 NOTE — TELEPHONE ENCOUNTER
Per Dr Cortney Luevano, she is not comfortable filling out the form  Spoke with pt's  to make him aware  He asked to have the form sent to him so he can hang on to it    Put form in the mail today

## 2021-02-19 NOTE — ASSESSMENT & PLAN NOTE
· New onset atrial fibrillation during most recent hospitalization, with RVR  · Loaded with amiodarone in the hospital   · Now on amiodarone 200 mg daily  · S/p PPM placement 1/19/2021  · Continue Metoprolol for rate control  · Xarelto for anticoagulation  · Follow up with EP and Cardiology

## 2021-02-19 NOTE — PROGRESS NOTES
[address]  [address]  [address] [facility]  [phone]    [facility]  PROGRESS NOTE        NAME: Toni Finn  AGE: 79 y o  SEX: female  :  1940  DATE OF ENCOUNTER: 2021  POS: 31 (SNF)    Assessment and Plan     Dysuria  · Complaining of dysuria  · Afebrile  · Will check UA, C&S to r/o UTI  Seizure-like activity ([facility] [address] )  · Rapid response called while at [address] due to seizure like activity, associated with acute change in mental status  · EEG done in the hospital was reported as abnormal with diffuse delta activity and L temporal delta activity, findings that may indicate mild diffuse cerebral dysfunction with superimposed L temporal focal cerebral dysfunction  · Continue Keppra at current dose  · Follow up with Neurology 2021  · Per Neurogoly note reviewed from the hospital, dated 2021, [employer] reporting form was completed and sent  · Strongly advised to stop driving  A-fib ([facility] [address] )  · New onset atrial fibrillation during most recent hospitalization, with RVR  · Loaded with amiodarone in the hospital   · Now on amiodarone 200 mg daily  · S/p PPM placement 2021  · Continue Metoprolol for rate control  · Xarelto for anticoagulation  · Follow up with EP and Cardiology  CAD (coronary artery disease)  · Status post cardiac cath with PCI + DESx2 2021  · Per Cardiology's recommendations, continue Effient and Xarelto  · Ranexa and Imdur discontinued during previous hospitalization  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)  · EF 45% in the hospital, possibly precipitated by atrial fibrillation with RVR  · Bilateral LE edema on exam   · Will give one time dose of lasix, compression socks  · Continue current diuretic regimen  · Follow up with Cardiology  Essential hypertension  · BP stable  · Continue Metoprolol and Bumex       PAD (peripheral artery disease) (HCC)  ·  Diffuse VD with bilateral lower extremity occlusive disease  · Chronic occlusion of right subclavian artery  · Stenosis of left subclavian artery, left carotid  Artery, celiac artery, bilateral renal arteries  · Aspirin was discontinued by EP  · Continue Effient 10 mg daily, Lipitor  · On Xarelto, in the setting of Afib  · Follow up with Dr Sheryle Overman of Vascular Surgery  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[facility] [address] )  · S/p PPM placement per EP on 1/19/2021  Toxic metabolic encephalopathy  · Patient had episodes of confusion in the hospital   · Has been evaluated by OT at [facility], found to have a low score on her MoCA at 21/30  · Concerned for possible underlying MCI vs early dementia  · Continue OT, remain as active as possible, physically, mentally, socially  · Follow up with PCP, consider referral to Geriatric Medicine as outpatient  Urinary retention  · Patient had an episode of urinary retention in the hospital requiring temporary placement of dominguez catheter  · Treated with ATB course for UTI, also received one time dose of diflucan for yeast infection with improvement  · Now complaining of dysuria again  · Will recheck UA, C&S  Hyperlipidemia  · Continue lipitor  Jerman Garcia MD  Geriatric Medicine  02/19/2021       Chief Complaint   Patient seen and examined for follow up on chronic conditions  History of Present Illness       78 yo F with underlying CAD s/p CABG and stents, hypertension, hyperlipidemia, admitted to [address] [place] [address] on 1/7/2021 after she presented to the ED with complaints of chest pain and dyspnea  Found to have elevated troponins and congestive heart failure  She underwent cardiac catheterization on 1/11/21 with PCI/Stenting x 2 to the L main and LAD  During that hospital stay, she also had RLL PNA felt to be secondary to aspiration/vomiting, for which she completed a course of antibiotics    Course was also complicated by episode of non specific enteritis/colitis as reported per CT scan of the abdomen and pelvis  No diarrhea associated and antibiotics were discontinued  later on she did develop diarrhea, C  Diff negative  Patient had new onset atrial fibrillation during that admission, received amiodarone, loading dose of digoxin and metoprolol and did convert to NSR but was found to have a 4 s pause before conversion  Transferred to [facility] for EP evaluation  On 1/19 she underwent dual chamber PPM implantation  on 1/21 patient disharged to [facility] for acute rehab services  Her rehabillitation was limited due to continued loose stools as well as urinary retention, hyponatremia and poor oral intake       On 1/29 patient had an episode of seizure like activity  A stroke alert was called  CT H negative for acute intracranial abnormality  Microangiopathic changes  Unable to obtain MRI brain due to recent LAMINE and PPM placement  EEG was abnormal per hospital records  Recommendation was made to continue [address] and follow up with Neurology as outpatient       She had developed acute delirium with acute respiratory insufficiency secondary to flash pulmonary edema, requiring BiPAP, eventually weaned down to room air  Her mental status improved and patient was transferred out of ICU on 02/01  Cardiology and EP were following the patient during hospitalization due to tachycardia, loaded with amiodarone, now on amiodarone 200 mg po daily as well as anticoagulated with Xarelto       Her hospitalization was also complicated by urinary retention requiring Saeed catheter placement  She completed antibiotic course for UTI and successfully passed a voiding trial  She received one time dose of Diflucan due to concern for secondary yeast infection  Once considered stable, patient was discharged to [facility] for subacute rehab services  She has been participating in comprehensive rehab program and making adequate progress  Patient has been complaining of dysuria today     She is eating and drinking well, meal completion ranging from % of all meals  Patient states she wishes to return home early next week  Per my conversation with PT, patient is making good progress with therapy, she has been able to climb steps, and ambulating well  She has however, scored 21/30 on her MoCA, there are concerns with her executive function as well as short term memory  She also had one time seizure in the hospital, recommendation has been made for her to stop driving  Per records reviewed, [employer] form was completed in the hospital      Seen and examined today for follow up  Sitting comfortably eating dinner  States she feels well  Her only complaint is bilateral lower extremity edema  Otherwise feels well and ready to go home  She offers no other complaints  Denies any pain  Denies any fever/chills, chest pain/shortness of breath, abdominal discomfort, nausea/vomiting, diarrhea/constipation or dysuria  The following portions of the patient's history were reviewed and updated as appropriate: allergies, current medications, past family history, past medical history, past social history, past surgical history and problem list     Review of Systems     A complete review of systems was performed  All negative, except as per HPI  History     Past Medical History:   Diagnosis Date    Anal fissure     Cardiac disorder     Cognitive changes 12/23/2020    Esophageal reflux     Esophagitis, reflux     Hemorrhoids     Hepatic hemangioma     Last Assessed: 1/13/2015    Herpes zoster     History of colonic polyps     Hypertension     Ischemic colitis ([address] )     Lumbar herniated disc     Malignant neoplasm without specification of site ([address] )     Nephrolithiasis     L   Lithotripsy    Nontoxic single thyroid nodule     Last Assessed: 1/13/2015    Osteoarthritis     Overactive bladder     Raynaud disease     Respiratory system disease     Sjogren's disease ([address]     Spinal stenosis     PONCHO (stress urinary incontinence, female)     Uterovaginal prolapse     Grade I-II     Allergies   Allergen Reactions    Sulfa Antibiotics Anaphylaxis    Formaldehyde     Codeine Palpitations       Objective     Vital Signs  BP: 130/70       HR:80 T:96 5    RR:18 O2Sat:98% RA W:126 lb    Physical Exam  GEN: NAD  Non-toxic appearing  Looks frail  HEENT: NC/AT  TRUDY  EIOMI  Oropharynx moist and clear  No oral lesions  CV: S1, S2   RRR, regular rate  No murmur appreciated  PULM: Non-labored respirations  CTA bilaterally  ABD: Soft, NT/ND  BSx4  EXT: +1 bilateral ankle edema  NEURO:  Awake, alert and oriented x 3  Pertinent Laboratory/Diagnostic Studies     02/18/2021:  WBC 5 5, HB 10 9, HCT 33 3,   02/18/2021:  Na 139, K 4 1, Cl 104, CO2 28, BUN 10, CR 0 72, GLU 89, EGFR 79    Current Medications     Current Medications Reviewed and updated in EMR  Monthly orders reviewed and signed in chart  Please note:  Voice-recognition software may have been used in the preparation of this document  Occasional wrong word or "sound-alike" substitutions may have occurred due to the inherent limitations of voice recognition software  Interpretation should be guided by context

## 2021-02-19 NOTE — ASSESSMENT & PLAN NOTE
· Patient had an episode of urinary retention in the hospital requiring temporary placement of dominguez catheter  · Treated with ATB course for UTI, also received one time dose of diflucan for yeast infection with improvement  · Now complaining of dysuria again  · Will recheck UA, C&S

## 2021-02-19 NOTE — ASSESSMENT & PLAN NOTE
· Patient had episodes of confusion in the hospital   · Has been evaluated by OT at SNF, found to have a low score on her MoCA at 21/30  · Concerned for possible underlying MCI vs early dementia  · Continue OT, remain as active as possible, physically, mentally, socially  · Follow up with PCP, consider referral to Geriatric Medicine as outpatient

## 2021-02-19 NOTE — ASSESSMENT & PLAN NOTE
· EF 45% in the hospital, possibly precipitated by atrial fibrillation with RVR  · Bilateral LE edema on exam   · Will give one time dose of lasix, compression socks  · Continue current diuretic regimen  · Follow up with Cardiology

## 2021-02-19 NOTE — ASSESSMENT & PLAN NOTE
· Rapid response called while at [address] due to seizure like activity, associated with acute change in mental status  · EEG done in the hospital was reported as abnormal with diffuse delta activity and L temporal delta activity, findings that may indicate mild diffuse cerebral dysfunction with superimposed L temporal focal cerebral dysfunction  · Continue Keppra at current dose  · Follow up with Neurology 04/02/2021  · Per Neurogoly note reviewed from the hospital, dated 02/02/2021, [employer] reporting form was completed and sent  · Strongly advised to stop driving

## 2021-02-22 ENCOUNTER — TELEPHONE (OUTPATIENT)
Dept: OTHER | Facility: OTHER | Age: 81
End: 2021-02-22

## 2021-02-22 ENCOUNTER — HOSPITAL ENCOUNTER (OUTPATIENT)
Facility: HOSPITAL | Age: 81
Setting detail: OBSERVATION
Discharge: HOME HEALTH | End: 2021-02-23
Attending: EMERGENCY MEDICINE | Admitting: INTERNAL MEDICINE
Payer: MEDICARE

## 2021-02-22 ENCOUNTER — APPOINTMENT (EMERGENCY)
Dept: RADIOLOGY | Facility: HOSPITAL | Age: 81
End: 2021-02-22
Payer: MEDICARE

## 2021-02-22 DIAGNOSIS — I48.91 A-FIB (HCC): ICD-10-CM

## 2021-02-22 DIAGNOSIS — R07.9 CHEST PAIN: Primary | ICD-10-CM

## 2021-02-22 DIAGNOSIS — I25.10 CORONARY ARTERY DISEASE INVOLVING NATIVE HEART WITHOUT ANGINA PECTORIS, UNSPECIFIED VESSEL OR LESION TYPE: Chronic | ICD-10-CM

## 2021-02-22 DIAGNOSIS — R56.9 SEIZURE-LIKE ACTIVITY (HCC): ICD-10-CM

## 2021-02-22 DIAGNOSIS — I25.10 CAD (CORONARY ARTERY DISEASE): ICD-10-CM

## 2021-02-22 DIAGNOSIS — I50.40 COMBINED CONGESTIVE SYSTOLIC AND DIASTOLIC HEART FAILURE (HCC): ICD-10-CM

## 2021-02-22 LAB
ALBUMIN SERPL BCP-MCNC: 3.1 G/DL (ref 3.5–5)
ALP SERPL-CCNC: 68 U/L (ref 46–116)
ALT SERPL W P-5'-P-CCNC: 35 U/L (ref 12–78)
ANION GAP SERPL CALCULATED.3IONS-SCNC: 5 MMOL/L (ref 4–13)
AST SERPL W P-5'-P-CCNC: 52 U/L (ref 5–45)
ATRIAL RATE: 77 BPM
ATRIAL RATE: 79 BPM
BASOPHILS # BLD AUTO: 0.07 THOUSANDS/ΜL (ref 0–0.1)
BASOPHILS NFR BLD AUTO: 1 % (ref 0–1)
BILIRUB SERPL-MCNC: 0.99 MG/DL (ref 0.2–1)
BUN SERPL-MCNC: 12 MG/DL (ref 5–25)
CALCIUM ALBUM COR SERPL-MCNC: 10 MG/DL (ref 8.3–10.1)
CALCIUM SERPL-MCNC: 9.3 MG/DL (ref 8.3–10.1)
CHLORIDE SERPL-SCNC: 105 MMOL/L (ref 100–108)
CO2 SERPL-SCNC: 29 MMOL/L (ref 21–32)
CREAT SERPL-MCNC: 0.8 MG/DL (ref 0.6–1.3)
EOSINOPHIL # BLD AUTO: 0.1 THOUSAND/ΜL (ref 0–0.61)
EOSINOPHIL NFR BLD AUTO: 2 % (ref 0–6)
ERYTHROCYTE [DISTWIDTH] IN BLOOD BY AUTOMATED COUNT: 15 % (ref 11.6–15.1)
GFR SERPL CREATININE-BSD FRML MDRD: 69 ML/MIN/1.73SQ M
GLUCOSE SERPL-MCNC: 98 MG/DL (ref 65–140)
HCT VFR BLD AUTO: 37.3 % (ref 34.8–46.1)
HGB BLD-MCNC: 11.6 G/DL (ref 11.5–15.4)
IMM GRANULOCYTES # BLD AUTO: 0.03 THOUSAND/UL (ref 0–0.2)
IMM GRANULOCYTES NFR BLD AUTO: 1 % (ref 0–2)
LYMPHOCYTES # BLD AUTO: 3.69 THOUSANDS/ΜL (ref 0.6–4.47)
LYMPHOCYTES NFR BLD AUTO: 59 % (ref 14–44)
MCH RBC QN AUTO: 29.7 PG (ref 26.8–34.3)
MCHC RBC AUTO-ENTMCNC: 31.1 G/DL (ref 31.4–37.4)
MCV RBC AUTO: 95 FL (ref 82–98)
MONOCYTES # BLD AUTO: 0.55 THOUSAND/ΜL (ref 0.17–1.22)
MONOCYTES NFR BLD AUTO: 9 % (ref 4–12)
NEUTROPHILS # BLD AUTO: 1.74 THOUSANDS/ΜL (ref 1.85–7.62)
NEUTS SEG NFR BLD AUTO: 28 % (ref 43–75)
NRBC BLD AUTO-RTO: 0 /100 WBCS
P AXIS: 65 DEGREES
P AXIS: 66 DEGREES
PLATELET # BLD AUTO: 231 THOUSANDS/UL (ref 149–390)
PMV BLD AUTO: 10 FL (ref 8.9–12.7)
POTASSIUM SERPL-SCNC: 3.7 MMOL/L (ref 3.5–5.3)
PR INTERVAL: 112 MS
PR INTERVAL: 88 MS
PROT SERPL-MCNC: 6.9 G/DL (ref 6.4–8.2)
QRS AXIS: 131 DEGREES
QRS AXIS: 134 DEGREES
QRSD INTERVAL: 114 MS
QRSD INTERVAL: 130 MS
QT INTERVAL: 368 MS
QT INTERVAL: 440 MS
QTC INTERVAL: 416 MS
QTC INTERVAL: 504 MS
RBC # BLD AUTO: 3.91 MILLION/UL (ref 3.81–5.12)
SODIUM SERPL-SCNC: 139 MMOL/L (ref 136–145)
T WAVE AXIS: -25 DEGREES
T WAVE AXIS: 226 DEGREES
TROPONIN I SERPL-MCNC: 0.03 NG/ML
TROPONIN I SERPL-MCNC: 0.04 NG/ML
VENTRICULAR RATE: 77 BPM
VENTRICULAR RATE: 79 BPM
WBC # BLD AUTO: 6.18 THOUSAND/UL (ref 4.31–10.16)

## 2021-02-22 PROCEDURE — 84484 ASSAY OF TROPONIN QUANT: CPT | Performed by: EMERGENCY MEDICINE

## 2021-02-22 PROCEDURE — 93010 ELECTROCARDIOGRAM REPORT: CPT | Performed by: INTERNAL MEDICINE

## 2021-02-22 PROCEDURE — 85025 COMPLETE CBC W/AUTO DIFF WBC: CPT | Performed by: EMERGENCY MEDICINE

## 2021-02-22 PROCEDURE — 99024 POSTOP FOLLOW-UP VISIT: CPT | Performed by: INTERNAL MEDICINE

## 2021-02-22 PROCEDURE — 99220 PR INITIAL OBSERVATION CARE/DAY 70 MINUTES: CPT | Performed by: INTERNAL MEDICINE

## 2021-02-22 PROCEDURE — 99285 EMERGENCY DEPT VISIT HI MDM: CPT | Performed by: EMERGENCY MEDICINE

## 2021-02-22 PROCEDURE — 80053 COMPREHEN METABOLIC PANEL: CPT | Performed by: EMERGENCY MEDICINE

## 2021-02-22 PROCEDURE — 84484 ASSAY OF TROPONIN QUANT: CPT | Performed by: INTERNAL MEDICINE

## 2021-02-22 PROCEDURE — 99285 EMERGENCY DEPT VISIT HI MDM: CPT

## 2021-02-22 PROCEDURE — 93005 ELECTROCARDIOGRAM TRACING: CPT

## 2021-02-22 PROCEDURE — 71046 X-RAY EXAM CHEST 2 VIEWS: CPT

## 2021-02-22 PROCEDURE — 36415 COLL VENOUS BLD VENIPUNCTURE: CPT | Performed by: EMERGENCY MEDICINE

## 2021-02-22 RX ORDER — POTASSIUM CHLORIDE 20 MEQ/1
40 TABLET, EXTENDED RELEASE ORAL DAILY
Status: DISCONTINUED | OUTPATIENT
Start: 2021-02-22 | End: 2021-02-23 | Stop reason: HOSPADM

## 2021-02-22 RX ORDER — METOPROLOL SUCCINATE 25 MG/1
25 TABLET, EXTENDED RELEASE ORAL DAILY
Status: DISCONTINUED | OUTPATIENT
Start: 2021-02-22 | End: 2021-02-23 | Stop reason: HOSPADM

## 2021-02-22 RX ORDER — BUMETANIDE 2 MG/1
2 TABLET ORAL DAILY
Status: DISCONTINUED | OUTPATIENT
Start: 2021-02-22 | End: 2021-02-23 | Stop reason: HOSPADM

## 2021-02-22 RX ORDER — LEVETIRACETAM 500 MG/1
500 TABLET ORAL EVERY 12 HOURS SCHEDULED
Status: DISCONTINUED | OUTPATIENT
Start: 2021-02-22 | End: 2021-02-23 | Stop reason: HOSPADM

## 2021-02-22 RX ORDER — PANTOPRAZOLE SODIUM 40 MG/1
40 TABLET, DELAYED RELEASE ORAL
Status: DISCONTINUED | OUTPATIENT
Start: 2021-02-22 | End: 2021-02-23 | Stop reason: HOSPADM

## 2021-02-22 RX ORDER — NITROGLYCERIN 0.4 MG/1
0.4 TABLET SUBLINGUAL
Status: DISCONTINUED | OUTPATIENT
Start: 2021-02-22 | End: 2021-02-23 | Stop reason: HOSPADM

## 2021-02-22 RX ORDER — AMIODARONE HYDROCHLORIDE 200 MG/1
200 TABLET ORAL
Status: DISCONTINUED | OUTPATIENT
Start: 2021-02-23 | End: 2021-02-23 | Stop reason: HOSPADM

## 2021-02-22 RX ORDER — ACETAMINOPHEN 325 MG/1
650 TABLET ORAL EVERY 6 HOURS PRN
Status: DISCONTINUED | OUTPATIENT
Start: 2021-02-22 | End: 2021-02-23 | Stop reason: HOSPADM

## 2021-02-22 RX ORDER — ATORVASTATIN CALCIUM 20 MG/1
20 TABLET, FILM COATED ORAL
Status: DISCONTINUED | OUTPATIENT
Start: 2021-02-22 | End: 2021-02-23 | Stop reason: HOSPADM

## 2021-02-22 RX ORDER — DOCUSATE SODIUM 100 MG/1
100 CAPSULE, LIQUID FILLED ORAL 2 TIMES DAILY
Status: DISCONTINUED | OUTPATIENT
Start: 2021-02-22 | End: 2021-02-23 | Stop reason: HOSPADM

## 2021-02-22 RX ORDER — SACCHAROMYCES BOULARDII 250 MG
250 CAPSULE ORAL 2 TIMES DAILY
Status: COMPLETED | OUTPATIENT
Start: 2021-02-22 | End: 2021-02-22

## 2021-02-22 RX ORDER — PRASUGREL 10 MG/1
10 TABLET, FILM COATED ORAL DAILY
Status: DISCONTINUED | OUTPATIENT
Start: 2021-02-22 | End: 2021-02-23 | Stop reason: HOSPADM

## 2021-02-22 RX ORDER — AMMONIUM LACTATE 12 G/100G
LOTION TOPICAL 2 TIMES DAILY PRN
Status: DISCONTINUED | OUTPATIENT
Start: 2021-02-22 | End: 2021-02-23 | Stop reason: HOSPADM

## 2021-02-22 RX ADMIN — POTASSIUM CHLORIDE 40 MEQ: 1500 TABLET, EXTENDED RELEASE ORAL at 13:32

## 2021-02-22 RX ADMIN — DOCUSATE SODIUM 100 MG: 100 CAPSULE, LIQUID FILLED ORAL at 13:32

## 2021-02-22 RX ADMIN — LEVETIRACETAM 500 MG: 500 TABLET, FILM COATED ORAL at 13:32

## 2021-02-22 RX ADMIN — PRASUGREL HYDROCHLORIDE 10 MG: 10 TABLET, FILM COATED ORAL at 17:34

## 2021-02-22 RX ADMIN — LEVETIRACETAM 500 MG: 500 TABLET, FILM COATED ORAL at 20:05

## 2021-02-22 RX ADMIN — MAGNESIUM OXIDE TAB 400 MG (241.3 MG ELEMENTAL MG) 400 MG: 400 (241.3 MG) TAB at 13:32

## 2021-02-22 RX ADMIN — ATORVASTATIN CALCIUM 20 MG: 20 TABLET, FILM COATED ORAL at 15:49

## 2021-02-22 RX ADMIN — DOCUSATE SODIUM 100 MG: 100 CAPSULE, LIQUID FILLED ORAL at 17:34

## 2021-02-22 RX ADMIN — METOPROLOL SUCCINATE 25 MG: 25 TABLET, EXTENDED RELEASE ORAL at 13:32

## 2021-02-22 RX ADMIN — PANTOPRAZOLE SODIUM 40 MG: 40 TABLET, DELAYED RELEASE ORAL at 13:32

## 2021-02-22 RX ADMIN — RIVAROXABAN 15 MG: 15 TABLET, FILM COATED ORAL at 15:49

## 2021-02-22 RX ADMIN — Medication 250 MG: at 13:32

## 2021-02-22 RX ADMIN — Medication 250 MG: at 17:34

## 2021-02-22 RX ADMIN — BUMETANIDE 2 MG: 2 TABLET ORAL at 13:40

## 2021-02-22 RX ADMIN — MAGNESIUM OXIDE TAB 400 MG (241.3 MG ELEMENTAL MG) 400 MG: 400 (241.3 MG) TAB at 15:49

## 2021-02-22 NOTE — ASSESSMENT & PLAN NOTE
Patient requiring 2L o2 via NC  States she had vomited 1/11/2021 which may have caused an aspiration   CXR concerning for RLL pneumonia but is improving      - d/c rocephin and flagyl  - Monitor vitals Render Risk Assessment In Note?: no Note Text (......Xxx Chief Complaint.): This diagnosis correlates with the Detail Level: Simple Other (Free Text): Due to some pigment and texture change within the spot of atrophy, recommended otc Differin gel 2-3 nights a week to start.

## 2021-02-22 NOTE — ED ATTENDING ATTESTATION
Christie Luna DO, saw and evaluated the patient  I have discussed the patient with the resident/non-physician practitioner and agree with the resident's/non-physician practitioner's findings, Plan of Care, and MDM as documented in the resident's/non-physician practitioner's note, except where noted  All available labs and Radiology studies were reviewed  At this point I agree with the current assessment done in the Emergency Department  I have conducted an independent evaluation of this patient including a focused history and a physical exam     ED Note - Rosie Conner 79 y o  female MRN: [id number]  Unit/Bed#: ED 10 Encounter: [id number]    History of Present Illness   HPI  Rosie Conner is a 79 y o  female who presents for evaluation of centrally located nonradiating chest pain that she is unable to describe  Denies any ripping or tearing type pain however  Pain onset sometime this morning patient was administered 3 sublingual nitro glycerin tablets as well as aspirin 324 mg  Patient denies any back pain  No shortness of breath, nausea vomiting, dizziness or diaphoresis  Patient pain free at this time  Patient feels well at this time  Patient with recent admission status post PCI and drug-eluting stent on 1/11/2021  History of tachy-jillian syndrome with pacemaker placement on 1/19/2021  REVIEW OF SYSTEMS  See HPI for further details  12 systems reviewed and otherwise negative except as noted  Historical Information     PAST MEDICAL HISTORY  Past Medical History:   Diagnosis Date    Anal fissure     Cardiac disorder     Cognitive changes 12/23/2020    Esophageal reflux     Esophagitis, reflux     Hemorrhoids     Hepatic hemangioma     Last Assessed: 1/13/2015    Herpes zoster     History of colonic polyps     Hypertension     Ischemic colitis ([address] )     Lumbar herniated disc     Malignant neoplasm without specification of site ([facility] [address] )     Nephrolithiasis     L   Lithotripsy    Nontoxic single thyroid nodule     Last Assessed: 1/13/2015    Osteoarthritis     Overactive bladder     Raynaud disease     Respiratory system disease     Sjogren's disease ([address]     Spinal stenosis     PONCHO (stress urinary incontinence, female)     Uterovaginal prolapse     Grade I-II       FAMILY HISTORY  Family History   Problem Relation Age of Onset    Heart disease Mother     Hypertension Mother     Osteoporosis Mother     Heart disease Father     Hypertension Father     Ulcerative colitis Family     Colon polyps Family        SOCIAL HISTORY  Social History     Socioeconomic History    Marital status: /Civil Union     Spouse name: None    Number of children: None    Years of education: None    Highest education level: None   Occupational History    Occupation: retired   Social Needs    Financial resource strain: None    Food insecurity     Worry: None     Inability: None    Transportation needs     Medical: None     Non-medical: None   Tobacco Use    Smoking status: Never Smoker    Smokeless tobacco: Never Used   Substance and Sexual Activity    Alcohol use: Not Currently     Frequency: Monthly or less     Comment: social    Drug use: No    Sexual activity: Not Currently   Lifestyle    Physical activity     Days per week: None     Minutes per session: None    Stress: None   Relationships    Social connections     Talks on phone: None     Gets together: None     Attends Nondenominational service: None     Active member of club or organization: None     Attends meetings of clubs or organizations: None     Relationship status: None    Intimate partner violence     Fear of current or ex partner: None     Emotionally abused: None     Physically abused: None     Forced sexual activity: None   Other Topics Concern    None   Social History Narrative    Activities: golf    Caffeine use    Consumes healthy diet    Daily caffeinated coffee consumption: 1 cup per day    Drinks caffeinated tea: 1 cup per day    Well balanced diet       SURGICAL HISTORY  Past Surgical History:   Procedure Laterality Date    APPENDECTOMY  1947    CARDIAC SURGERY      CABG    CATARACT EXTRACTION Bilateral     COLONOSCOPY  2012    Fiberoptic    COLONOSCOPY      Resolved: 2006 - 2012 5 year f/u    CORONARY ANGIOPLASTY WITH STENT PLACEMENT      CORONARY ARTERY BYPASS GRAFT      Resolved: 2012    ESOPHAGOGASTRODUODENOSCOPY  2012    Diagnostic    HEMORROIDECTOMY      KNEE SURGERY      LITHOTRIPSY      Renal    MALIGNANT SKIN LESION EXCISION      Face; Resolved: 2004    ME ESOPHAGOGASTRODUODENOSCOPY TRANSORAL DIAGNOSTIC N/A 4/13/2016    Procedure: EGD AND COLONOSCOPY;  Surgeon: Carrie Raphael MD;  [facility]; Service: Gastroenterology    RENAL ARTERY STENT      SKIN LESION EXCISION      Scalp    SOFT TISSUE TUMOR RESECTION      Shoulder; Resolved: 1995    THROMBOLYSIS      Postoperative Thrombolysis PTCA    TONSILLECTOMY      Resolved: 1944     Meds/Allergies     CURRENT MEDICATIONS  No current facility-administered medications for this encounter       Current Outpatient Medications:     acetaminophen (TYLENOL) 325 mg tablet, Take 2 tablets (650 mg total) by mouth every 6 (six) hours as needed for mild pain, Disp: , Rfl: 0    amiodarone 200 mg tablet, Take 1 tablet (200 mg total) by mouth daily with breakfast, Disp: , Rfl: 0    ammonium lactate (LAC-HYDRIN) 12 % lotion, Apply topically 2 (two) times a day as needed for dry skin, Disp: 400 g, Rfl: 0    atorvastatin (LIPITOR) 20 mg tablet, Take 1 tablet (20 mg total) by mouth daily with dinner, Disp: 30 tablet, Rfl: 0    bumetanide (BUMEX) 2 mg tablet, Take 1 tablet (2 mg total) by mouth daily, Disp: , Rfl: 0    levETIRAcetam (KEPPRA) 500 mg tablet, Take 1 tablet (500 mg total) by mouth every 12 (twelve) hours, Disp: , Rfl: 0    magnesium oxide (MAG-OX) 400 mg, Take 1 tablet (400 mg total) by mouth 2 (two) times a day before lunch and dinner, Disp: , Rfl: 0    metoprolol succinate (TOPROL-XL) 25 mg 24 hr tablet, Take 1 tablet (25 mg total) by mouth daily, Disp: , Rfl: 0    multivitamin (THERAGRAN) TABS, Take 1 tablet by mouth daily  , Disp: , Rfl:     nitroglycerin (NITROSTAT) 0 4 mg SL tablet, PLACE 1 TABLET (0 4 MG TOTAL) UNDER THE TONGUE EVERY 5 (FIVE) MINUTES AS NEEDED FOR CHEST PAIN, Disp: 25 tablet, Rfl: 0    nystatin (MYCOSTATIN) powder, Apply topically 2 (two) times a day, Disp: 15 g, Rfl: 0    pantoprazole (PROTONIX) 40 mg tablet, Take 1 tablet (40 mg total) by mouth daily in the early morning, Disp: 30 tablet, Rfl: 0    potassium chloride (K-DUR,KLOR-CON) 20 mEq tablet, Take 2 tablets (40 mEq total) by mouth daily, Disp: , Rfl: 0    prasugrel (EFFIENT) tablet, Take 1 tablet (10 mg total) by mouth daily, Disp: , Rfl: 0    psyllium (METAMUCIL) packet, Take 1 packet by mouth daily, Disp: , Rfl: 0    rivaroxaban (XARELTO) 15 mg tablet, Take 1 tablet (15 mg total) by mouth daily with dinner, Disp: , Rfl: 0    saccharomyces boulardii (FLORASTOR) 250 mg capsule, Take 1 capsule (250 mg total) by mouth 2 (two) times a day for 10 days, Disp: , Rfl: 0  (Not in a hospital admission)      ALLERGIES  Allergies   Allergen Reactions    Sulfa Antibiotics Anaphylaxis    Formaldehyde     Codeine Palpitations     Objective     PHYSICAL EXAM    VITAL SIGNS: Blood pressure 118/56, pulse 87, temperature 98 5 °F (36 9 °C), temperature source Oral, resp  rate 18, SpO2 98 %, not currently breastfeeding      Constitutional:  Appears well developed and well nourished, no acute distress, non-toxic appearance   Eyes:  PERRL, EOMI, conjunctivae pink, sclerae non-icteric    HENT:  Normocephalic/Atraumatic, no rhinorrhea, mucous membranes moist   Neck: normal range of motion, no tenderness, supple   Respiratory:  No respiratory distress, normal breath sounds   Cardiovascular:  Normal rate, normal rhythm    GI:  Soft, non-tender, non-distended  :  No CVAT, no flank ecchymosis  Musculoskeletal:  Diffuse tenderness/hyperesthesia of lower extremities  No swelling or edema, no tenderness, no deformities  Integument:  Pink, warm, dry, Well hydrated, no rash, no erythema, no bullae   Lymphatic:  No cervical/ tonsillar/ submandibular lymphadenopathy noted   Neurologic:  Awake, Alert & oriented x 3, CN 2-12 intact, no focal neurological deficits, motor function intact  Psychiatric:  Speech and behavior appropriate       ED COURSE and MDM:    Assessment/Plan   Assessment:  Heather Godinez is a 79 y o  female presents for evaluation of chest pain  Now resolved  After discussion with the patient her , they decided on level 3 DNR status  Accepting of reasonable medical care to include positive pressure ventilation (CPAP/BiPAP) as appropriate  Plan:  Labs, EKG, CXR, Symptom management  Disposition as appropriate  CRITICAL CARE TIME: 0 minutes      Portions of the record may have been created with voice recognition software  Occasional wrong word or "sound a like" substitutions may have occurred due to the inherent limitations of voice recognition software       ED Provider  Electronically Signed by

## 2021-02-22 NOTE — ASSESSMENT & PLAN NOTE
Wt Readings from Last 3 Encounters:   02/12/21 58 2 kg (128 lb 4 9 oz)   01/27/21 70 kg (154 lb 4 8 oz)   01/21/21 63 7 kg (140 lb 6 4 oz)       Seems euvolemic, per  lower extremity swelling improved significantly since last admission  Continue Bumex  Low-sodium diet, daily weights

## 2021-02-22 NOTE — TELEPHONE ENCOUNTER
[phone]/ZMWCRN from [facility]/Pt is Virginie Clark  38/Pt is having chest pain  May need to be sent out  Manjinder nKight was paged at [phone]    Please allow 20-30 mins for the provider to call back  If you do not hear from them by the, please call us back

## 2021-02-22 NOTE — ASSESSMENT & PLAN NOTE
Patient had a recent admission for NSTEMI status post cardiac catheterization with LAMINE to proximal LAD and LM 1/15/21  Has a history of CABG  On prasugrel, not on aspirin due to Xarelto    Statin  Comes in with midsternal chest pressure at rest   Received nitro and aspirin at the rehab facility since there was no improvement per nitro per  she was transferred to the hospital   Currently patient is chest pain-free  Initial troponin negative, continue to trend  EKG, ventricular paced rhythm  Will consult Cardiology

## 2021-02-22 NOTE — H&P
H&P- Viji Isbell 1940, 79 y o  female MRN: [id number]    Unit/Bed#: ED 10 Encounter: [id number]    Primary Care Provider: Jair Self MD   Date and time admitted to hospital: 2/22/2021  6:40 AM        * Chest pain  Assessment & Plan  Patient had a recent admission for NSTEMI status post cardiac catheterization with LAMINE to proximal LAD and LM 1/15/21  Has a history of CABG  On prasugrel, not on aspirin due to Xarelto  Statin  Comes in with midsternal chest pressure at rest   Received nitro and aspirin at the rehab facility since there was no improvement per nitro per  she was transferred to the hospital   Currently patient is chest pain-free  Initial troponin negative, continue to trend  EKG, ventricular paced rhythm  Will consult Cardiology      A-fib [facility])  Assessment & Plan  Rate controlled, continue amiodarone and metoprolol  Continue Xarelto    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[facility])  Assessment & Plan  Tachy-jillian syndrome, 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    Hyperlipidemia  Assessment & Plan  Continue statin    Seizure-like activity ([address] )  Assessment & Plan  On her previous admission patient has seizure-like activities  Evaluated by Neurology  She had an abnormal EEG which showed mild diffuse cerebral dysfunction with superimposed left temporal focal cerebral dysfunction      Continue Keppra    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)  Assessment & Plan  Wt Readings from Last 3 Encounters:   02/12/21 58 2 kg (128 lb 4 9 oz)   01/27/21 70 kg (154 lb 4 8 oz)   01/21/21 63 7 kg (140 lb 6 4 oz)       Seems euvolemic, per  lower extremity swelling improved significantly since last admission  Continue Bumex  Low-sodium diet, daily weights        VTE Prophylaxis: Rivaroxaban (Xarelto)  / sequential compression device   Code Status:  DNR DNI  POLST: There is no POLST form on file for this patient (pre-hospital)  Discussion with family:   at bedside    Anticipated Length of Stay:  Patient will be admitted on an Observation basis with an anticipated length of stay of less than 2 midnights  Justification for Hospital Stay:  Rule out ACS    Total Time for Visit, including Counseling / Coordination of Care: 45 minutes  Greater than 50% of this total time spent on direct patient counseling and coordination of care  Chief Complaint:   Chest pain    History of Present Illness:    Jordan Kumar is a 79 y o  female who presents with chest pain  Patient was recently discharged 02/13 to rehab  She was initially admitted for NSTEMI, underwent cardiac catheterization and stent placement to LAD and LM  Patient developed new onset atrial fibrillation, tachy-jillian syndrome and pacemaker was implanted 01/19/2021  This was complicated with pneumonia  She was discharged 01/21/2021 to rehab  While at rehab she developed seizure-like activities and got readmitted  Evaluated by Neurology, she had abnormal EEG and placed on Keppra  She also developed flash pulmonary edema and she was transferred to ICU  Mental status improved  She was also treated for urinary tract infection  Discharge to rehab 02/13/2021  This morning patient woke up and developed midsternal pressure-like chest pain at rest   Nonradiating  She received nitro and aspirin  Patient is not quite sure if the chest pain improved with nitro  She was brought to the ED for further evaluation  Currently patient is chest pain-free, initial troponin was negative, EKG without acute ischemic changes  She was admitted under observation to rule out ACS  Patient was to be discharged from rehab facility tomorrow home  Home health was arranged by rehab facility  Patient and  would like her to be discharged home from this hospital admission  Patient denies headache, visual changes, shortness of breath nausea vomiting diarrhea or trouble with urination        Review of Systems:    Review of Systems all reviewed and negative except as above    Past Medical and Surgical History:     Past Medical History:   Diagnosis Date    Anal fissure     Cardiac disorder     Cognitive changes 12/23/2020    Esophageal reflux     Esophagitis, reflux     Hemorrhoids     Hepatic hemangioma     Last Assessed: 1/13/2015    Herpes zoster     History of colonic polyps     Hypertension     [facility]     Lumbar herniated disc     Malignant neoplasm without specification of site ([facility] )     Nephrolithiasis     L  Lithotripsy    Nontoxic single thyroid nodule     Last Assessed: 1/13/2015    Osteoarthritis     Overactive bladder     Raynaud disease     Respiratory system disease     Sjogren's disease [facility]     Spinal stenosis     PONCHO (stress urinary incontinence, female)     Uterovaginal prolapse     Grade I-II       Past Surgical History:   Procedure Laterality Date    APPENDECTOMY  1947    CARDIAC SURGERY      CABG    CATARACT EXTRACTION Bilateral     COLONOSCOPY  2012    Fiberoptic    COLONOSCOPY      Resolved: 2006 - 2012 5 year f/u    CORONARY ANGIOPLASTY WITH STENT PLACEMENT      CORONARY ARTERY BYPASS GRAFT      Resolved: 2012    ESOPHAGOGASTRODUODENOSCOPY  2012    Diagnostic    HEMORROIDECTOMY      KNEE SURGERY      LITHOTRIPSY      Renal    MALIGNANT SKIN LESION EXCISION      Face; Resolved: 2004    TX ESOPHAGOGASTRODUODENOSCOPY TRANSORAL DIAGNOSTIC N/A 4/13/2016    Procedure: EGD AND COLONOSCOPY;  Surgeon: Jhonatan Yanes MD;  [facility]; Service: Gastroenterology    RENAL ARTERY STENT      SKIN LESION EXCISION      Scalp    SOFT TISSUE TUMOR RESECTION      Shoulder; Resolved: 1995    THROMBOLYSIS      Postoperative Thrombolysis PTCA    TONSILLECTOMY      Resolved: 1944       Meds/Allergies:    Prior to Admission medications    Medication Sig Start Date End Date Taking?  Authorizing Provider   acetaminophen (TYLENOL) 325 mg tablet Take 2 tablets (650 mg total) by mouth every 6 (six) hours as needed for mild pain 2/13/21   Milind Mo MD   amiodarone 200 mg tablet Take 1 tablet (200 mg total) by mouth daily with breakfast 2/14/21   Milind Mo MD   ammonium lactate (LAC-HYDRIN) 12 % lotion Apply topically 2 (two) times a day as needed for dry skin 2/13/21   Milind Mo MD   atorvastatin (LIPITOR) 20 mg tablet Take 1 tablet (20 mg total) by mouth daily with dinner 12/5/20   Maria Isabel Turcios DO   bumetanide (BUMEX) 2 mg tablet Take 1 tablet (2 mg total) by mouth daily 2/14/21   Milind Mo MD   levETIRAcetam (KEPPRA) 500 mg tablet Take 1 tablet (500 mg total) by mouth every 12 (twelve) hours 2/13/21   Milind Mo MD   magnesium oxide (MAG-OX) 400 mg Take 1 tablet (400 mg total) by mouth 2 (two) times a day before lunch and dinner 2/13/21   Milind Mo MD   metoprolol succinate (TOPROL-XL) 25 mg 24 hr tablet Take 1 tablet (25 mg total) by mouth daily 2/14/21   Milind Mo MD   multivitamin [facility]) TABS Take 1 tablet by mouth daily      Historical Provider, MD   nitroglycerin (NITROSTAT) 0 4 mg SL tablet PLACE 1 TABLET (0 4 MG TOTAL) UNDER THE TONGUE EVERY 5 (FIVE) MINUTES AS NEEDED FOR CHEST PAIN 1/6/21   Kat Kidd MD   nystatin (MYCOSTATIN) powder Apply topically 2 (two) times a day 2/13/21   Milind Mo MD   pantoprazole (PROTONIX) 40 mg tablet Take 1 tablet (40 mg total) by mouth daily in the early morning 12/24/20   Betina Villanueva PA-C   potassium chloride (K-DUR,KLOR-CON) 20 mEq tablet Take 2 tablets (40 mEq total) by mouth daily 2/14/21   Milind Mo MD   prasugrel (EFFIENT) tablet Take 1 tablet (10 mg total) by mouth daily 2/14/21   Milind Mo MD   psyllium (METAMUCIL) packet Take 1 packet by mouth daily 2/14/21   Milind Mo MD   rivaroxaban (XARELTO) 15 mg tablet Take 1 tablet (15 mg total) by mouth daily with dinner 2/13/21   Milind Mo MD   saccharomyces boulardii (FLORASTOR) 250 mg capsule Take 1 capsule (250 mg total) by mouth 2 (two) times a day for 10 days 2/13/21 2/23/21  Felipe Rasmussen MD     I have reviewed home medications with a medical source (PCP, Pharmacy, other)  Allergies: Allergies   Allergen Reactions    Sulfa Antibiotics Anaphylaxis    Formaldehyde     Codeine Palpitations       Social History:     Marital Status: /Civil Union   Substance Use History:   Social History     Substance and Sexual Activity   Alcohol Use Not Currently    Frequency: Monthly or less    Comment: social     Social History     Tobacco Use   Smoking Status Never Smoker   Smokeless Tobacco Never Used     Social History     Substance and Sexual Activity   Drug Use No       Family History:    Family History   Problem Relation Age of Onset    Heart disease Mother     Hypertension Mother     Osteoporosis Mother     Heart disease Father     Hypertension Father     Ulcerative colitis Family     Colon polyps Family        Physical Exam:     Vitals:   Blood Pressure: 118/56 (02/22/21 0643)  Pulse: 87 (02/22/21 0643)  Temperature: 98 5 °F (36 9 °C) (02/22/21 0643)  Temp Source: Oral (02/22/21 0643)  Respirations: 18 (02/22/21 0643)  SpO2: 98 % (02/22/21 0643)    Physical Exam  Constitutional:       General: She is not in acute distress  HENT:      Head: Atraumatic  Neck:      Musculoskeletal: Neck supple  Cardiovascular:      Rate and Rhythm: Normal rate and regular rhythm  Heart sounds: No murmur  No friction rub  No gallop  Pulmonary:      Effort: No respiratory distress  Breath sounds: Normal breath sounds  No wheezing  Abdominal:      General: Bowel sounds are normal  There is no distension  Palpations: Abdomen is soft  Tenderness: There is no abdominal tenderness  Musculoskeletal:      Comments: Minimal lower extremity swelling   Skin:     General: Skin is warm and dry  Neurological:      General: No focal deficit present  Mental Status: She is alert     Psychiatric:         Mood and Affect: Mood normal  Additional Data:     Lab Results: I have personally reviewed pertinent reports  Results from last 7 days   Lab Units 02/22/21  0646   WBC Thousand/uL 6 18   HEMOGLOBIN g/dL 11 6   HEMATOCRIT % 37 3   PLATELETS Thousands/uL 231   NEUTROS PCT % 28*   LYMPHS PCT % 59*   MONOS PCT % 9   EOS PCT % 2     Results from last 7 days   Lab Units 02/22/21  0646   SODIUM mmol/L 139   POTASSIUM mmol/L 3 7   CHLORIDE mmol/L 105   CO2 mmol/L 29   BUN mg/dL 12   CREATININE mg/dL 0 80   ANION GAP mmol/L 5   CALCIUM mg/dL 9 3   ALBUMIN g/dL 3 1*   TOTAL BILIRUBIN mg/dL 0 99   ALK PHOS U/L 68   ALT U/L 35   AST U/L 52*   GLUCOSE RANDOM mg/dL 98                       Imaging: I have personally reviewed pertinent reports  XR chest 2 views   Final Result by Sari Garcia MD (02/22 1805)      No acute cardiopulmonary disease  Workstation performed: [id number]             EKG, Pathology, and Other Studies Reviewed on Admission:   · EKG:  V paced rhythm    Allscripts / Epic Records Reviewed: Yes     ** Please Note: This note has been constructed using a voice recognition system   **

## 2021-02-22 NOTE — ASSESSMENT & PLAN NOTE
On her previous admission patient has seizure-like activities  Evaluated by Neurology  She had an abnormal EEG which showed mild diffuse cerebral dysfunction with superimposed left temporal focal cerebral dysfunction      Continue Keppra

## 2021-02-22 NOTE — PLAN OF CARE
Problem: PAIN - ADULT  Goal: Verbalizes/displays adequate comfort level or baseline comfort level  Description: Interventions:  - Encourage patient to monitor pain and request assistance  - Assess pain using appropriate pain scale  - Administer analgesics based on type and severity of pain and evaluate response  - Implement non-pharmacological measures as appropriate and evaluate response  - Consider cultural and social influences on pain and pain management  - Notify physician/advanced practitioner if interventions unsuccessful or patient reports new pain  Outcome: Progressing     Problem: DISCHARGE PLANNING  Goal: Discharge to home or other facility with appropriate resources  Description: INTERVENTIONS:  - Identify barriers to discharge w/patient and caregiver  - Arrange for needed discharge resources and transportation as appropriate  - Identify discharge learning needs (meds, wound care, etc )  - Arrange for interpretive services to assist at discharge as needed  - Refer to Case Management Department for coordinating discharge planning if the patient needs post-hospital services based on physician/advanced practitioner order or complex needs related to functional status, cognitive ability, or social support system  Outcome: Progressing     Problem: Knowledge Deficit  Goal: Patient/family/caregiver demonstrates understanding of disease process, treatment plan, medications, and discharge instructions  Description: Complete learning assessment and assess knowledge base    Interventions:  - Provide teaching at level of understanding  - Provide teaching via preferred learning methods  Outcome: Progressing     Problem: CARDIOVASCULAR - ADULT  Goal: Maintains optimal cardiac output and hemodynamic stability  Description: INTERVENTIONS:  - Monitor I/O, vital signs and rhythm  - Monitor for S/S and trends of decreased cardiac output  - Administer and titrate ordered vasoactive medications to optimize hemodynamic stability  - Assess quality of pulses, skin color and temperature  - Assess for signs of decreased coronary artery perfusion  - Instruct patient to report change in severity of symptoms  Outcome: Progressing     Problem: SAFETY ADULT  Goal: Patient will remain free of falls  Description: INTERVENTIONS:  - Assess patient frequently for physical needs  -  Identify cognitive and physical deficits and behaviors that affect risk of falls    -  [place] fall precautions as indicated by assessment   - Educate patient/family on patient safety including physical limitations  - Instruct patient to call for assistance with activity based on assessment  - Modify environment to reduce risk of injury  - Consider OT/PT consult to assist with strengthening/mobility  Outcome: Progressing

## 2021-02-22 NOTE — CONSULTS
Consultation - Cardiology Team One  Karissa Martínez 79 y o  female MRN: [id number]  Unit/Bed#: ED 10 Encounter: [id number]    Inpatient consult to Cardiology  Consult performed by: KATHY Stevens  Consult ordered by: Viola Stevenson MD      Physician Requesting Consult: Viola Stevenson MD  Reason for Consult / Principal Problem: Chest pain     Assessment/ Plan    1  Chest pain   Troponin x 2 negative, continue to trend   ECG showed: V paced  Unclear story, patient is poor historian     Continue to monitor symptoms     2  CAD with hx of CABG x 4 in 2011 and  LAMINE proximal LAD and ostial L main 1/11/2021  On effient, statin and BB     3  Paroxysmal Atrial fibrillation s/p DC PPM 1/19/2021  ECG showed V paced   On amiodarone 200 mg PO daily and metoprolol XL 25 mg PO daily   On Xarelto for [facility]   Will interrogate PPM today    4  Chronic diastolic heart failure  On Bumex 2 mg PO daily   Monitor I/Os  Daily weights   Continue 2 gram Na diet     5  Hypertension BP: 159/71    6  Hyperlipidemia LDL 88    History of Present Illness   HPI: Karissa Martínez is a 79y o  year old female who has CAD s/p CABG x 4 in 2011 and LAMINE to proximal LAD and ostial L main 8/29/8444, chronic diastolic heart failure, paroxysmal atrial fibrillation, s/p DC PPM 1/19/2021, hypertension, hyperlipidemia and seizure disorder  She follows with cardiologist Dr Tai Lopez  She presents to [facility] ER 2/22/2021 via EMS from [facility] with complaint of chest pain  Patient was admitted 1/11/2021 due to worsening chest pain  She had cardiac cath and s/p LAMINE to proximal LAD and osital L main  During that admission she developed new onset atrial fibrillation with tachybrady syndrome and had MDT DC PPM placed 1/19/2021  Patient was discharged to [facility] 1/21/2021  Patient was readmitted 1/29/2021 from [facility] to [facility] for seizure like activity  Neurology was following and noted no seizure activity on EEG   Patient went into atrial fibrillation with RVR during admission and was started on amiodarone  General cardiology was consulted for acute on chronic diastolic heart failure and started IV diuretics and was later transitioned to oral bumex  Patient was discharged to [facility] for rehab 2/13/2021  Patient is a poor historian but reports she is in the hospital for chest pain  When asked details regarding chest pain she is confused  She is now reporting she has been in the hospital for days and does not recall the last time she had chest pain  She denies current chest pain  No complaint of SOB, palpitations, abdominal bloating or lower extremity edema  Echocardiogram 1/30/2021 showed EF 50%, no regional wall motion abnormalities, severe hypokinesi sof the distal septum, grade II diastolic dysfunction, LA mildly dilated, mild to moderate MR, mild TR and severe pulmonary hypertension  EKG reviewed personally:  V paced  Ventricular rate 77 bpm  HI interval 88 ms  QRSD 130 ms  QT interval 368 ms  Qtc interval 416 ms    Telemetry reviewed personally:   V paced HR 60s    Review of Systems   Constitution: Negative for chills, fever and malaise/fatigue  HENT: Negative for congestion  Cardiovascular: Negative for chest pain, dyspnea on exertion, leg swelling, orthopnea and palpitations  Respiratory: Negative for cough and shortness of breath  Musculoskeletal: Negative for falls  Gastrointestinal: Negative for bloating, nausea and vomiting  Neurological: Negative for dizziness and light-headedness  Psychiatric/Behavioral: Negative for altered mental status  All other systems reviewed and are negative      Historical Information   Past Medical History:   Diagnosis Date    Anal fissure     Cardiac disorder     Cognitive changes 12/23/2020    Esophageal reflux     Esophagitis, reflux     Hemorrhoids     Hepatic hemangioma     Last Assessed: 1/13/2015    Herpes zoster     History of colonic polyps     Hypertension     Ischemic colitis ([facility] )     Lumbar herniated disc     Malignant neoplasm without specification of site ([facility] )     Nephrolithiasis     L  Lithotripsy    Nontoxic single thyroid nodule     Last Assessed: 1/13/2015    Osteoarthritis     Overactive bladder     Raynaud disease     Respiratory system disease     [facility]     Spinal stenosis     PONCHO (stress urinary incontinence, female)     Uterovaginal prolapse     Grade I-II     Past Surgical History:   Procedure Laterality Date    APPENDECTOMY  1947    CARDIAC SURGERY      CABG    CATARACT EXTRACTION Bilateral     COLONOSCOPY  2012    Fiberoptic    COLONOSCOPY      Resolved: 2006 - 2012 5 year f/u    CORONARY ANGIOPLASTY WITH STENT PLACEMENT      CORONARY ARTERY BYPASS GRAFT      Resolved: 2012    ESOPHAGOGASTRODUODENOSCOPY  2012    Diagnostic    HEMORROIDECTOMY      KNEE SURGERY      LITHOTRIPSY      Renal    MALIGNANT SKIN LESION EXCISION      Face; Resolved: 2004    [place] ESOPHAGOGASTRODUODENOSCOPY TRANSORAL DIAGNOSTIC N/A 4/13/2016    Procedure: EGD AND COLONOSCOPY;  Surgeon: Jud Jimenez MD;  [facility];   Service: Gastroenterology    RENAL ARTERY STENT      SKIN LESION EXCISION      Scalp    SOFT TISSUE TUMOR RESECTION      Shoulder; Resolved: 1995    THROMBOLYSIS      Postoperative Thrombolysis PTCA    TONSILLECTOMY      Resolved: 1944     Social History     Substance and Sexual Activity   Alcohol Use Not Currently    Frequency: Monthly or less    Comment: social     Social History     Substance and Sexual Activity   Drug Use No     Social History     Tobacco Use   Smoking Status Never Smoker   Smokeless Tobacco Never Used     Family History:   Family History   Problem Relation Age of Onset    Heart disease Mother     Hypertension Mother     Osteoporosis Mother     Heart disease Father     Hypertension Father     Ulcerative colitis Family     Colon polyps Family        Meds/Allergies   all current active meds have been reviewed and current meds:   Current Facility-Administered Medications   Medication Dose Route Frequency    acetaminophen (TYLENOL) tablet 650 mg  650 mg Oral Q6H PRN    [START ON 2/23/2021] amiodarone tablet 200 mg  200 mg Oral Daily With Breakfast    ammonium lactate (LAC-HYDRIN) 12 % lotion   Topical BID PRN    atorvastatin (LIPITOR) tablet 20 mg  20 mg Oral Daily With Dinner    bumetanide (BUMEX) tablet 2 mg  2 mg Oral Daily    docusate sodium (COLACE) capsule 100 mg  100 mg Oral BID    levETIRAcetam (KEPPRA) tablet 500 mg  500 mg Oral Q12H [address]    magnesium oxide (MAG-OX) tablet 400 mg  400 mg Oral BID before lunch/dinner    metoprolol succinate (TOPROL-XL) 24 hr tablet 25 mg  25 mg Oral Daily    nitroglycerin (NITROSTAT) SL tablet 0 4 mg  0 4 mg Sublingual Q5 Min PRN    pantoprazole (PROTONIX) EC tablet 40 mg  40 mg Oral Early Morning    potassium chloride (K-DUR,KLOR-CON) CR tablet 40 mEq  40 mEq Oral Daily    prasugrel (EFFIENT) tablet 10 mg  10 mg Oral Daily    rivaroxaban (XARELTO) tablet 15 mg  15 mg Oral Daily With Dinner    saccharomyces boulardii (FLORASTOR) capsule 250 mg  250 mg Oral BID          Allergies   Allergen Reactions    Sulfa Antibiotics Anaphylaxis    Formaldehyde     Codeine Palpitations       Objective   Vitals: Blood pressure 159/71, pulse 79, temperature 98 5 °F (36 9 °C), temperature source Oral, resp  rate 16, SpO2 99 %, not currently breastfeeding  ,     There is no height or weight on file to calculate BMI ,     Systolic (06WLT), DSI:683 , Min:118 , SHIVANI:499     Diastolic (94DOQ), DEBORAH:75, Min:56, Max:71    No intake or output data in the 24 hours ending 02/22/21 1140  Weight (last 2 days)     None        Invasive Devices     Peripheral Intravenous Line            Peripheral IV 02/22/21 Left Hand less than 1 day              Physical Exam  Constitutional:       General: She is not in acute distress  HENT:      Head: Normocephalic        Mouth/Throat:      Mouth: Mucous membranes are moist    Neck:      Musculoskeletal: Neck supple  Cardiovascular:      Rate and Rhythm: Normal rate and regular rhythm  Pulses: Normal pulses  Pulmonary:      Effort: Pulmonary effort is normal  No respiratory distress  Breath sounds: Normal breath sounds  Comments: RA  Abdominal:      General: Bowel sounds are normal       Palpations: Abdomen is soft  Musculoskeletal:         General: Swelling present  Comments: +1 pedal edema L> R    Skin:     General: Skin is warm and dry  Capillary Refill: Capillary refill takes less than 2 seconds  Neurological:      Mental Status: She is alert        Comments: Alert to self and place  Confused on time and situation    Psychiatric:         Mood and Affect: Mood normal            LABORATORY RESULTS:  Results from last 7 days   Lab Units 02/22/21  1017 02/22/21  0646   TROPONIN I ng/mL 0 04 0 04     CBC with diff:   Results from last 7 days   Lab Units 02/22/21  0646   WBC Thousand/uL 6 18   HEMOGLOBIN g/dL 11 6   HEMATOCRIT % 37 3   MCV fL 95   PLATELETS Thousands/uL 231   MCH pg 29 7   MCHC g/dL 31 1*   RDW % 15 0   MPV fL 10 0   NRBC AUTO /100 WBCs 0       CMP:  Results from last 7 days   Lab Units 02/22/21  0646   POTASSIUM mmol/L 3 7   CHLORIDE mmol/L 105   CO2 mmol/L 29   BUN mg/dL 12   CREATININE mg/dL 0 80   CALCIUM mg/dL 9 3   AST U/L 52*   ALT U/L 35   ALK PHOS U/L 68   EGFR ml/min/1 73sq m 69       BMP:  Results from last 7 days   Lab Units 02/22/21  0646   POTASSIUM mmol/L 3 7   CHLORIDE mmol/L 105   CO2 mmol/L 29   BUN mg/dL 12   CREATININE mg/dL 0 80   CALCIUM mg/dL 9 3          Lab Results   Component Value Date    NTBNP 1,254 (H) 01/07/2021    NTBNP 958 (H) 12/02/2020         Lipid Profile:   Lab Results   Component Value Date    CHOL 231 07/14/2015    CHOL 215 05/07/2015    CHOL 233 01/12/2015     Lab Results   Component Value Date    HDL 62 12/04/2020    HDL 68 09/22/2020    HDL 61 01/28/2020     Lab Results Component Value Date    LDLCALC 88 2020    LDLCALC 115 (H) 2020    LDLCALC 143 (H) 2020     Lab Results   Component Value Date    TRIG 88 2020    TRIG 90 2020    TRIG 93 2020     Cardiac testing:   Results for orders placed during the hospital encounter of 20   Echo complete with contrast if indicated    Narrative [address]  [phone]    Transthoracic Echocardiogram  2D, M-mode, Doppler, and Color Doppler    Study date:  03-Dec-2020    Patient: Susan Powell  MR number: [id number]  Account number: [de-identified]  : 1940  Age: 79 years  Gender: Female  Status: Inpatient  Location: Bedside  Height: 60 in  Weight: 136 lb  BP: 99/ 53 mmHg    Indications: Heart Failure    Diagnoses: I50 9 - Heart failure, unspecified    Sonographer:  Fawn Freeman RDCS  Primary Physician:  Kevin Sicard, MD  Referring Physician:  Celestina Hendrix DO  Group:  [id number]  Interpreting Physician:  Clyde Gray MD    SUMMARY    LEFT VENTRICLE:  Systolic function was mildly reduced  Ejection fraction was estimated to be 45 %  There was mild diffuse hypokinesis  Wall thickness was mildly to moderately increased  There was mild concentric hypertrophy  Doppler parameters were consistent with abnormal left ventricular relaxation (grade 1 diastolic dysfunction)  VENTRICULAR SEPTUM:  There was paradoxical motion  These changes are consistent with LBBB  LEFT ATRIUM:  The atrium was mildly dilated  MITRAL VALVE:  There was mild to moderate regurgitation  TRICUSPID VALVE:  There was mild regurgitation  Estimated peak PA pressure was 55 mmHg  The findings suggest moderate pulmonary hypertension  PULMONIC VALVE:  There was trace regurgitation      HISTORY: PRIOR HISTORY: CAD, Hypertension, GERD, Hyperlipidemia, CP, Stent, CABG, Raynaud's    PROCEDURE: The procedure was performed at the bedside  This was a routine study  The transthoracic approach was used  The study included complete 2D imaging, M-mode, complete spectral Doppler, and color Doppler  The heart rate was 82 bpm,  at the start of the study  Images were obtained from the parasternal, apical, subcostal, and suprasternal notch acoustic windows  Intravenous contrast ( 0 6ml Definity in NSS) was administered to opacify the left ventricle  Echocardiographic views were limited due to decreased penetration and lung interference  This was a technically difficult study  LEFT VENTRICLE: Size was normal  Systolic function was mildly reduced  Ejection fraction was estimated to be 45 %  There was mild diffuse hypokinesis  Wall thickness was mildly to moderately increased  There was mild concentric  hypertrophy  DOPPLER: Doppler parameters were consistent with abnormal left ventricular relaxation (grade 1 diastolic dysfunction)  VENTRICULAR SEPTUM: There was paradoxical motion  These changes are consistent with LBBB  RIGHT VENTRICLE: The size was normal  Systolic function was normal  Wall thickness was normal     LEFT ATRIUM: The atrium was mildly dilated  RIGHT ATRIUM: Size was normal     MITRAL VALVE: Valve structure was normal  There was normal leaflet separation  DOPPLER: The transmitral velocity was within the normal range  There was no evidence for stenosis  There was mild to moderate regurgitation  AORTIC VALVE: The valve was trileaflet  Leaflets exhibited normal thickness and normal cuspal separation  DOPPLER: Transaortic velocity was within the normal range  There was no evidence for stenosis  There was no regurgitation  TRICUSPID VALVE: The valve structure was normal  There was normal leaflet separation  DOPPLER: The transtricuspid velocity was within the normal range  There was no evidence for stenosis  There was mild regurgitation  Estimated peak PA  pressure was 55 mmHg   The findings suggest moderate pulmonary hypertension  PULMONIC VALVE: Leaflets exhibited normal thickness, no calcification, and normal cuspal separation  DOPPLER: The transpulmonic velocity was within the normal range  There was trace regurgitation  PERICARDIUM: There was no pericardial effusion  The pericardium was normal in appearance  AORTA: The root exhibited normal size  SYSTEM MEASUREMENT TABLES    2D  %FS: 17 94 %  Ao Diam: 2 72 cm  Ao asc: 2 46 cm  EDV(Teich): 55 79 ml  EF(Teich): 38 05 %  ESV(Teich): 34 57 ml  IVSd: 1 08 cm  LA Area: 16 14 cm2  LA Diam: 3 04 cm  LVEDV MOD A4C: 52 91 ml  LVEF MOD A4C: 38 16 %  LVESV MOD A4C: 32 72 ml  LVIDd: 3 64 cm  LVIDs: 2 98 cm  LVLd A4C: 6 09 cm  LVLs A4C: 5 64 cm  LVPWd: 0 93 cm  RA Area: 7 24 cm2  RVIDd: 2 34 cm  SV MOD A4C: 20 19 ml  SV(Teich): 21 23 ml    CW  TR Vmax: 3 73 m/s  TR maxP 52 mmHg    MM  TAPSE: 2 12 cm    PW  E' Sept: 0 04 m/s  E/E' Sept: 21 24  MV A Nicola: 1 42 m/s  MV Dec [place]: 4 42 m/s2  MV DecT: 191 8 ms  MV E Nicola: 0 85 m/s  MV E/A Ratio: 0 6  MV PHT: 55 62 ms  MVA By PHT: 3 96 cm2    [employer] Accredited Echocardiography Laboratory    Prepared and electronically signed by    Any Lomas MD  Signed 03-Dec-2020 13:58:50       Imaging:   Xr Chest 2 Views    Result Date: 2021  Narrative: CHEST INDICATION:   chest pain  COMPARISON:  Chest radiograph from 2021 and chest CT from 2021  EXAM PERFORMED/VIEWS:  XR CHEST PA & LATERAL  FINDINGS: Normal heart size  CABG  Coronary stent  Left subclavian pacemaker leads in right atrial appendage and right ventricular septum  Lungs clear  No effusion or pneumothorax  Osseous structures normal for age  Impression: No acute cardiopulmonary disease  Workstation performed: [id number]     Thank you for allowing us to participate in this patient's care  This pt will follow up with Dr Papito Blunt once discharged  Counseling / Coordination of Care  Total floor / unit time spent today 45 minutes  Greater than 50% of total time was spent with the patient and / or family counseling and / or coordination of care  A description of the counseling / coordination of care: Review of history, current assessment, development of a plan  Code Status: Level 3 - DNAR and DNI    ** Please Note: Dragon 360 Dictation voice to text software may have been used in the creation of this document   **

## 2021-02-22 NOTE — ED PROVIDER NOTES
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 s/p pacemaker placement on 1/19, presents to the ED via EMS from skilled nursing facility [facility]) with concern for chest pain  Pt was recently admitted from 1/7-1/29 for chest pain with elevated troponin for which she was cathed and had stent placed  Admission was complicated by new onset Afib RVR/tachy-jillian requiring pacemaker placement, UTI, and seizure-like activity  She has been at [facility] for rehab since discharge, and is planned to go home with her  tomorrow  Pt reports chest pain pain woke her from sleep this morning  She is unsure of the time  Facility staff called EMS, who administered 3 SL nitroglycerin and 324mg aspirin after which pt had resolution of pain  Pt denies having any associated SOB, nausea, vomiting, dizziness, lightheadedness, abdominal pain, fever, cough, congestion  Lower extremity edema not worse than usual  No hx DVT/PE  Pt unsure if chest pain today was similar to chest pain she had with her previous admission  Pt is AOx3 in the ED  Currently asymptomatic  Prior to Admission Medications   Prescriptions Last Dose Informant Patient Reported?  Taking?   acetaminophen (TYLENOL) 325 mg tablet   No No   Sig: Take 2 tablets (650 mg total) by mouth every 6 (six) hours as needed for mild pain   amiodarone 200 mg tablet   No No   Sig: Take 1 tablet (200 mg total) by mouth daily with breakfast   ammonium lactate (LAC-HYDRIN) 12 % lotion   No No   Sig: Apply topically 2 (two) times a day as needed for dry skin   atorvastatin (LIPITOR) 20 mg tablet  Self No No   Sig: Take 1 tablet (20 mg total) by mouth daily with dinner   bumetanide (BUMEX) 2 mg tablet   No No   Sig: Take 1 tablet (2 mg total) by mouth daily   levETIRAcetam (KEPPRA) 500 mg tablet   No No   Sig: Take 1 tablet (500 mg total) by mouth every 12 (twelve) hours   magnesium oxide (MAG-OX) 400 mg   No No   Sig: Take 1 tablet (400 mg total) by mouth 2 (two) times a day before lunch and dinner   metoprolol succinate (TOPROL-XL) 25 mg 24 hr tablet   No No   Sig: Take 1 tablet (25 mg total) by mouth daily   multivitamin (THERAGRAN) TABS  Self Yes No   Sig: Take 1 tablet by mouth daily     nitroglycerin (NITROSTAT) 0 4 mg SL tablet   No No   Sig: PLACE 1 TABLET (0 4 MG TOTAL) UNDER THE TONGUE EVERY 5 (FIVE) MINUTES AS NEEDED FOR CHEST PAIN   nystatin (MYCOSTATIN) powder   No No   Sig: Apply topically 2 (two) times a day   pantoprazole (PROTONIX) 40 mg tablet  Self No No   Sig: Take 1 tablet (40 mg total) by mouth daily in the early morning   potassium chloride (K-DUR,KLOR-CON) 20 mEq tablet   No No   Sig: Take 2 tablets (40 mEq total) by mouth daily   prasugrel (EFFIENT) tablet   No No   Sig: Take 1 tablet (10 mg total) by mouth daily   psyllium (METAMUCIL) packet   No No   Sig: Take 1 packet by mouth daily   rivaroxaban (XARELTO) 15 mg tablet   No No   Sig: Take 1 tablet (15 mg total) by mouth daily with dinner   saccharomyces boulardii (FLORASTOR) 250 mg capsule   No No   Sig: Take 1 capsule (250 mg total) by mouth 2 (two) times a day for 10 days      Facility-Administered Medications: None       Past Medical History:   Diagnosis Date    Anal fissure     Cardiac disorder     Cognitive changes 12/23/2020    Esophageal reflux     Esophagitis, reflux     Hemorrhoids     Hepatic hemangioma     Last Assessed: 1/13/2015    Herpes zoster     History of colonic polyps     Hypertension     [facility] [address]     Lumbar herniated disc     Malignant neoplasm without specification of site ([facility] [address]     Nephrolithiasis     L  Lithotripsy    Nontoxic single thyroid nodule     Last Assessed: 1/13/2015    Osteoarthritis     Overactive bladder     Raynaud disease     Respiratory system disease     Sjogren's disease [facility] [address]     Spinal stenosis     PONCHO (stress urinary incontinence, female)     Uterovaginal prolapse     Grade I-II       Past Surgical History:   Procedure Laterality Date    APPENDECTOMY  1947    CARDIAC SURGERY      CABG    CATARACT EXTRACTION Bilateral     COLONOSCOPY  2012    Fiberoptic    COLONOSCOPY      Resolved: 2006 - 2012 5 year f/u    CORONARY ANGIOPLASTY WITH STENT PLACEMENT      CORONARY ARTERY BYPASS GRAFT      Resolved: 2012    ESOPHAGOGASTRODUODENOSCOPY  2012    Diagnostic    HEMORROIDECTOMY      KNEE SURGERY      LITHOTRIPSY      Renal    MALIGNANT SKIN LESION EXCISION      Face; Resolved: 2004    VT ESOPHAGOGASTRODUODENOSCOPY TRANSORAL DIAGNOSTIC N/A 4/13/2016    Procedure: EGD AND COLONOSCOPY;  Surgeon: Ellie Mcnamara MD;  [facility]: [facility]; Service: Gastroenterology    RENAL ARTERY STENT      SKIN LESION EXCISION      Scalp    SOFT TISSUE TUMOR RESECTION      Shoulder; Resolved: 1995    THROMBOLYSIS      Postoperative Thrombolysis PTCA    TONSILLECTOMY      Resolved: 64       Family History   Problem Relation Age of Onset    Heart disease Mother     Hypertension Mother     Osteoporosis Mother     Heart disease Father     Hypertension Father     Ulcerative colitis Family     Colon polyps Family      I have reviewed and agree with the history as documented      E-Cigarette/Vaping     E-Cigarette/Vaping Substances    Nicotine No     THC No     CBD No     Flavoring No     Other No     Unknown No      Social History     Tobacco Use    Smoking status: Never Smoker    Smokeless tobacco: Never Used   Substance Use Topics    Alcohol use: Not Currently     Frequency: Monthly or less     Comment: social    Drug use: No        Review of Systems Constitutional: Negative for chills and fever  HENT: Negative for congestion, rhinorrhea and sore throat  Respiratory: Negative for chest tightness and shortness of breath  Cardiovascular: Positive for chest pain  Gastrointestinal: Negative for abdominal pain, diarrhea, nausea and vomiting  Genitourinary: Negative for dysuria and hematuria  Musculoskeletal: Negative for arthralgias and myalgias  Skin: Negative for rash  Neurological: Negative for dizziness, syncope, weakness, light-headedness, numbness and headaches  All other systems reviewed and are negative  Physical Exam  ED Triage Vitals [02/22/21 0643]   Temperature Pulse Respirations Blood Pressure SpO2   98 5 °F (36 9 °C) 87 18 118/56 98 %      Temp Source Heart Rate Source Patient Position - Orthostatic VS BP Location FiO2 (%)   Oral Monitor Sitting Right arm --      Pain Score       No Pain             Orthostatic Vital Signs  Vitals:    02/22/21 0643 02/22/21 1022   BP: 118/56 159/71   Pulse: 87 79   Patient Position - Orthostatic VS: Sitting Lying       Physical Exam  Vitals signs and nursing note reviewed  Constitutional:       General: She is not in acute distress  Appearance: She is well-developed  She is not diaphoretic  HENT:      Head: Normocephalic and atraumatic  Right Ear: External ear normal       Left Ear: External ear normal       Nose: Nose normal    Eyes:      Conjunctiva/sclera: Conjunctivae normal       Pupils: Pupils are equal, round, and reactive to light  Neck:      Musculoskeletal: Normal range of motion and neck supple  Cardiovascular:      Rate and Rhythm: Normal rate and regular rhythm  Heart sounds: Normal heart sounds  No murmur  No friction rub  No gallop  Pulmonary:      Effort: Pulmonary effort is normal  No respiratory distress  Breath sounds: Normal breath sounds  No wheezing, rhonchi or rales  Abdominal:      General: Bowel sounds are normal  There is no distension  Palpations: Abdomen is soft  There is no mass  Tenderness: There is no abdominal tenderness  There is no guarding or rebound  Musculoskeletal: Normal range of motion  Right lower leg: Edema (1+) present  Left lower leg: Edema (1+) present  Lymphadenopathy:      Cervical: No cervical adenopathy  Skin:     General: Skin is warm and dry  Neurological:      Mental Status: She is alert and oriented to person, place, and time  Cranial Nerves: No cranial nerve deficit  Sensory: No sensory deficit  ED Medications  Medications   acetaminophen (TYLENOL) tablet 650 mg (has no administration in time range)   amiodarone tablet 200 mg (has no administration in time range)   ammonium lactate (LAC-HYDRIN) 12 % lotion (has no administration in time range)   atorvastatin (LIPITOR) tablet 20 mg (has no administration in time range)   bumetanide (BUMEX) tablet 2 mg (has no administration in time range)   levETIRAcetam (KEPPRA) tablet 500 mg (has no administration in time range)   magnesium oxide (MAG-OX) tablet 400 mg (has no administration in time range)   metoprolol succinate (TOPROL-XL) 24 hr tablet 25 mg (has no administration in time range)   nitroglycerin (NITROSTAT) SL tablet 0 4 mg (has no administration in time range)   pantoprazole (PROTONIX) EC tablet 40 mg (has no administration in time range)   potassium chloride (K-DUR,KLOR-CON) CR tablet 40 mEq (has no administration in time range)   prasugrel (EFFIENT) tablet 10 mg (has no administration in time range)   rivaroxaban (XARELTO) tablet 15 mg (has no administration in time range)   saccharomyces boulardii (FLORASTOR) capsule 250 mg (has no administration in time range)   docusate sodium (COLACE) capsule 100 mg (has no administration in time range)       Diagnostic Studies  Results Reviewed     Procedure Component Value Units Date/Time    Troponin I [[id number]] Collected: 02/22/21 1017    Lab Status:  In process Specimen: Blood from Arm, Right Updated: 02/22/21 1023    Troponin I [[id number]]  (Normal) Collected: 02/22/21 0646    Lab Status: Final result Specimen: Blood from Arm, Right Updated: 02/22/21 0719     Troponin I 0 04 ng/mL     Comprehensive metabolic panel [[id number]]  (Abnormal) Collected: 02/22/21 0646    Lab Status: Final result Specimen: Blood from Arm, Right Updated: 02/22/21 0714     Sodium 139 mmol/L      Potassium 3 7 mmol/L      Chloride 105 mmol/L      CO2 29 mmol/L      ANION GAP 5 mmol/L      BUN 12 mg/dL      Creatinine 0 80 mg/dL      Glucose 98 mg/dL      Calcium 9 3 mg/dL      Corrected Calcium 10 0 mg/dL      AST 52 U/L      ALT 35 U/L      Alkaline Phosphatase 68 U/L      Total Protein 6 9 g/dL      Albumin 3 1 g/dL      Total Bilirubin 0 99 mg/dL      eGFR 69 ml/min/1 73sq m     Narrative:      [facility] [place] guidelines for Chronic Kidney Disease (CKD):     Stage 1 with normal or high GFR (GFR > 90 mL/min/1 73 square meters)    Stage 2 Mild CKD (GFR = 60-89 mL/min/1 73 square meters)    Stage 3A Moderate CKD (GFR = 45-59 mL/min/1 73 square meters)    Stage 3B Moderate CKD (GFR = 30-44 mL/min/1 73 square meters)    Stage 4 Severe CKD (GFR = 15-29 mL/min/1 73 square meters)    Stage 5 End Stage CKD (GFR <15 mL/min/1 73 square meters)  Note: GFR calculation is accurate only with a steady state creatinine    CBC and differential [[id number]]  (Abnormal) Collected: 02/22/21 0646    Lab Status: Final result Specimen: Blood from Arm, Right Updated: 02/22/21 0705     WBC 6 18 Thousand/uL      RBC 3 91 Million/uL      Hemoglobin 11 6 g/dL      Hematocrit 37 3 %      MCV 95 fL      MCH 29 7 pg      MCHC 31 1 g/dL      RDW 15 0 %      MPV 10 0 fL      Platelets 056 Thousands/uL      nRBC 0 /100 WBCs      Neutrophils Relative 28 %      Immat GRANS % 1 %      Lymphocytes Relative 59 %      Monocytes Relative 9 %      Eosinophils Relative 2 %      Basophils Relative 1 %      Neutrophils Absolute 1 74 Thousands/µL      Immature Grans Absolute 0 03 Thousand/uL      Lymphocytes Absolute 3 69 Thousands/µL      Monocytes Absolute 0 55 Thousand/µL      Eosinophils Absolute 0 10 Thousand/µL      Basophils Absolute 0 07 Thousands/µL                  XR chest 2 views   Final Result by Sue Canseco MD (02/22 0833)      No acute cardiopulmonary disease  Workstation performed: [id number]               Procedures  Procedures      ED Course  ED Course as of Feb 22 1035   Mon Feb 22, 2021   5524 EKG ventricular paced rhythm rate 77, normal axis  Nonspecific ST depression I, II, aVL  Nonspecific ST elevation aVR, V1  Nonspecific T wave inversion III, V1, V3  Q wave I, aVL  Qtc 416  Compared to prior 2/4/21 pt did not have ST depressions or T wave inversion in III        0741 Discussed results with patient and   They are amenable to admission  Request DNR/DNI code status       0750 Admitted to [address] [place] [address]      Most Recent Value   Heart Score Risk Calculator   History  1 Filed at: 02/22/2021 0727   ECG  1 Filed at: 02/22/2021 1588   Age  2 Filed at: 02/22/2021 6392   Risk Factors  2 Filed at: 02/22/2021 0727   Troponin  0 Filed at: 02/22/2021 1442   HEART Score  6 Filed at: 02/22/2021 5620                                MDM  80 yo female with PMH HTN, HLD, CAD s/p CABG in 2012and cath with stent placement on 1/11/21, Afib 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 s/p pacemaker placement on 1/19, presenting from rehab with concern for chest pain that resolved with nitro and aspirin  Will obtain CBC, CMP, troponin, EKG, CXR to evaluate for leukocytosis anemia, electrolyte abnormality, renal dysfunction, hepatic dysfunction, acute cardiac abnormality, pulmonary abnormality  Given significant history pt will require admission for observation on telemetry and serial troponins     Disposition  Final diagnoses:   Chest pain     Time reflects when diagnosis was documented in both MDM as applicable and the Disposition within this note     Time User Action Codes Description Comment    2/22/2021  7:51 AM Bernardino Connell Add [R07 9] Chest pain       ED Disposition     ED Disposition Condition Date/Time Comment    Admit Stable Mon Feb 22, 2021  7:51 AM Case was discussed with Dr Deshawn Flores and the patient's admission status was agreed to be Admission Status: observation status to the service of Dr Priya Schofield   Follow-up Information    None         Patient's Medications   Discharge Prescriptions    No medications on file     No discharge procedures on file  PDMP Review     None           ED Provider  Attending physically available and evaluated Jordan Stacy FUNES managed the patient along with the ED Attending      Electronically Signed by         Xiao Cortes MD  02/22/21 8240

## 2021-02-23 ENCOUNTER — PATIENT OUTREACH (OUTPATIENT)
Dept: CASE MANAGEMENT | Facility: HOSPITAL | Age: 81
End: 2021-02-23

## 2021-02-23 VITALS
BODY MASS INDEX: 26.82 KG/M2 | DIASTOLIC BLOOD PRESSURE: 63 MMHG | SYSTOLIC BLOOD PRESSURE: 150 MMHG | HEIGHT: 58 IN | OXYGEN SATURATION: 96 % | RESPIRATION RATE: 12 BRPM | HEART RATE: 79 BPM | TEMPERATURE: 97.3 F

## 2021-02-23 LAB
ANION GAP SERPL CALCULATED.3IONS-SCNC: 5 MMOL/L (ref 4–13)
BASOPHILS # BLD AUTO: 0.04 THOUSANDS/ΜL (ref 0–0.1)
BASOPHILS NFR BLD AUTO: 1 % (ref 0–1)
BUN SERPL-MCNC: 11 MG/DL (ref 5–25)
CALCIUM SERPL-MCNC: 9.2 MG/DL (ref 8.3–10.1)
CHLORIDE SERPL-SCNC: 104 MMOL/L (ref 100–108)
CO2 SERPL-SCNC: 31 MMOL/L (ref 21–32)
CREAT SERPL-MCNC: 0.8 MG/DL (ref 0.6–1.3)
EOSINOPHIL # BLD AUTO: 0.09 THOUSAND/ΜL (ref 0–0.61)
EOSINOPHIL NFR BLD AUTO: 2 % (ref 0–6)
ERYTHROCYTE [DISTWIDTH] IN BLOOD BY AUTOMATED COUNT: 15 % (ref 11.6–15.1)
GFR SERPL CREATININE-BSD FRML MDRD: 69 ML/MIN/1.73SQ M
GLUCOSE SERPL-MCNC: 124 MG/DL (ref 65–140)
HCT VFR BLD AUTO: 37.4 % (ref 34.8–46.1)
HGB BLD-MCNC: 11.5 G/DL (ref 11.5–15.4)
IMM GRANULOCYTES # BLD AUTO: 0.02 THOUSAND/UL (ref 0–0.2)
IMM GRANULOCYTES NFR BLD AUTO: 0 % (ref 0–2)
LYMPHOCYTES # BLD AUTO: 3.07 THOUSANDS/ΜL (ref 0.6–4.47)
LYMPHOCYTES NFR BLD AUTO: 55 % (ref 14–44)
MAGNESIUM SERPL-MCNC: 2.2 MG/DL (ref 1.6–2.6)
MCH RBC QN AUTO: 29.4 PG (ref 26.8–34.3)
MCHC RBC AUTO-ENTMCNC: 30.7 G/DL (ref 31.4–37.4)
MCV RBC AUTO: 96 FL (ref 82–98)
MONOCYTES # BLD AUTO: 0.4 THOUSAND/ΜL (ref 0.17–1.22)
MONOCYTES NFR BLD AUTO: 7 % (ref 4–12)
NEUTROPHILS # BLD AUTO: 1.96 THOUSANDS/ΜL (ref 1.85–7.62)
NEUTS SEG NFR BLD AUTO: 35 % (ref 43–75)
NRBC BLD AUTO-RTO: 0 /100 WBCS
PLATELET # BLD AUTO: 207 THOUSANDS/UL (ref 149–390)
PMV BLD AUTO: 10 FL (ref 8.9–12.7)
POTASSIUM SERPL-SCNC: 3.6 MMOL/L (ref 3.5–5.3)
RBC # BLD AUTO: 3.91 MILLION/UL (ref 3.81–5.12)
SODIUM SERPL-SCNC: 140 MMOL/L (ref 136–145)
WBC # BLD AUTO: 5.58 THOUSAND/UL (ref 4.31–10.16)

## 2021-02-23 PROCEDURE — 97163 PT EVAL HIGH COMPLEX 45 MIN: CPT

## 2021-02-23 PROCEDURE — 97167 OT EVAL HIGH COMPLEX 60 MIN: CPT

## 2021-02-23 PROCEDURE — 99217 PR OBSERVATION CARE DISCHARGE MANAGEMENT: CPT | Performed by: FAMILY MEDICINE

## 2021-02-23 PROCEDURE — 83735 ASSAY OF MAGNESIUM: CPT | Performed by: INTERNAL MEDICINE

## 2021-02-23 PROCEDURE — 80048 BASIC METABOLIC PNL TOTAL CA: CPT | Performed by: INTERNAL MEDICINE

## 2021-02-23 PROCEDURE — 85025 COMPLETE CBC W/AUTO DIFF WBC: CPT | Performed by: INTERNAL MEDICINE

## 2021-02-23 RX ORDER — METOPROLOL SUCCINATE 25 MG/1
25 TABLET, EXTENDED RELEASE ORAL DAILY
Qty: 30 TABLET | Refills: 0 | Status: SHIPPED | OUTPATIENT
Start: 2021-02-23 | End: 2021-03-23

## 2021-02-23 RX ORDER — BUMETANIDE 2 MG/1
2 TABLET ORAL DAILY
Qty: 30 TABLET | Refills: 0 | Status: SHIPPED | OUTPATIENT
Start: 2021-02-23 | End: 2021-03-23 | Stop reason: SDUPTHER

## 2021-02-23 RX ORDER — LEVETIRACETAM 500 MG/1
500 TABLET ORAL EVERY 12 HOURS SCHEDULED
Qty: 60 TABLET | Refills: 0 | Status: SHIPPED | OUTPATIENT
Start: 2021-02-23 | End: 2021-03-23

## 2021-02-23 RX ORDER — ATORVASTATIN CALCIUM 20 MG/1
20 TABLET, FILM COATED ORAL
Qty: 30 TABLET | Refills: 0 | Status: SHIPPED | OUTPATIENT
Start: 2021-02-23 | End: 2021-03-23 | Stop reason: SDUPTHER

## 2021-02-23 RX ORDER — POTASSIUM CHLORIDE 20 MEQ/1
40 TABLET, EXTENDED RELEASE ORAL DAILY
Qty: 60 TABLET | Refills: 0 | Status: SHIPPED | OUTPATIENT
Start: 2021-02-23 | End: 2021-03-23

## 2021-02-23 RX ORDER — PRASUGREL 10 MG/1
10 TABLET, FILM COATED ORAL DAILY
Qty: 30 TABLET | Refills: 0 | Status: SHIPPED | OUTPATIENT
Start: 2021-02-23 | End: 2021-04-19 | Stop reason: SDUPTHER

## 2021-02-23 RX ORDER — AMIODARONE HYDROCHLORIDE 200 MG/1
200 TABLET ORAL
Qty: 30 TABLET | Refills: 0 | Status: SHIPPED | OUTPATIENT
Start: 2021-02-23 | End: 2021-03-23 | Stop reason: SDUPTHER

## 2021-02-23 RX ORDER — NITROGLYCERIN 0.4 MG/1
0.4 TABLET SUBLINGUAL
Qty: 25 TABLET | Refills: 0 | Status: SHIPPED | OUTPATIENT
Start: 2021-02-23

## 2021-02-23 RX ORDER — POTASSIUM CHLORIDE 20 MEQ/1
40 TABLET, EXTENDED RELEASE ORAL DAILY
Qty: 60 TABLET | Refills: 0
Start: 2021-02-23 | End: 2021-02-23 | Stop reason: SDUPTHER

## 2021-02-23 RX ORDER — PANTOPRAZOLE SODIUM 40 MG/1
40 TABLET, DELAYED RELEASE ORAL
Qty: 30 TABLET | Refills: 0 | Status: SHIPPED | OUTPATIENT
Start: 2021-02-23 | End: 2021-03-23 | Stop reason: SDUPTHER

## 2021-02-23 RX ADMIN — AMIODARONE HYDROCHLORIDE 200 MG: 200 TABLET ORAL at 07:41

## 2021-02-23 RX ADMIN — POTASSIUM CHLORIDE 40 MEQ: 1500 TABLET, EXTENDED RELEASE ORAL at 08:47

## 2021-02-23 RX ADMIN — PRASUGREL HYDROCHLORIDE 10 MG: 10 TABLET, FILM COATED ORAL at 08:47

## 2021-02-23 RX ADMIN — MAGNESIUM OXIDE TAB 400 MG (241.3 MG ELEMENTAL MG) 400 MG: 400 (241.3 MG) TAB at 11:58

## 2021-02-23 RX ADMIN — METOPROLOL SUCCINATE 25 MG: 25 TABLET, EXTENDED RELEASE ORAL at 08:45

## 2021-02-23 RX ADMIN — BUMETANIDE 2 MG: 2 TABLET ORAL at 08:45

## 2021-02-23 RX ADMIN — LEVETIRACETAM 500 MG: 500 TABLET, FILM COATED ORAL at 08:47

## 2021-02-23 RX ADMIN — DOCUSATE SODIUM 100 MG: 100 CAPSULE, LIQUID FILLED ORAL at 08:47

## 2021-02-23 RX ADMIN — PANTOPRAZOLE SODIUM 40 MG: 40 TABLET, DELAYED RELEASE ORAL at 05:57

## 2021-02-23 NOTE — DISCHARGE INSTR - AVS FIRST PAGE
Please give the outpatient appointment with the Cardiology and Neurology as recommended during the last admission

## 2021-02-23 NOTE — PLAN OF CARE
Problem: OCCUPATIONAL THERAPY ADULT  Goal: Performs self-care activities at highest level of function for planned discharge setting  See evaluation for individualized goals  Description: Treatment Interventions: ADL retraining, Functional transfer training, Endurance training, Patient/family training, UE strengthening/ROM, Compensatory technique education, Continued evaluation, Energy conservation, Activityengagement          See flowsheet documentation for full assessment, interventions and recommendations  Note: Limitation: Decreased ADL status, Decreased endurance, Decreased high-level ADLs  Prognosis: Good  Assessment: Pt is a 79 y o  female admitted to [facility] on 2/22/2021 w/ chest pain  Comorbidities include a h/o Anal fissure, Cardiac disorder, Cognitive changes (12/23/2020), Esophageal reflux, Esophagitis, reflux, Hemorrhoids, Hepatic hemangioma, Herpes zoster, History of colonic polyps, Hypertension, Ischemic colitis ([facility] [address] Lumbar herniated disc, Malignant neoplasm without specification of site ([facility] [address] ), Nephrolithiasis, Nontoxic single thyroid nodule, Osteoarthritis, Overactive bladder, Raynaud disease, Respiratory system disease, Sjogren's disease ([facility] [address] ), Spinal stenosis, PONCHO (stress urinary incontinence, female), and Uterovaginal prolapse  Pt with active OT orders and ambulate orders  Pt presents this admission from rehab  Before rehab pt was living in a 1 story home with 2 LUDWIN  Pt lives with her  who she reports is able to provide 24/7 assist upon d/c  Pt reports that at baseline she was I w/  ADLS and IADLS, (+) drove, & required use of RW PTA  Currently pt is supervision for functional transfers, min A for functional mobility, min A for UB ADLS and mod A for LB ADLS  Pt is limited at this time 2*: endurance, activity tolerance, functional mobility, forward functional reach, functional standing tolerance and decreased I w/ ADLS/IADLS  The following Occupational Performance Areas to address include: grooming, bathing/shower, toilet hygiene, dressing, functional mobility and clothing management  Based on the aforementioned OT evaluation, functional performance deficits, and assessments, pt has been identified as a high complexity evaluation  From OT standpoint, anticipate d/c home OT  Pt to continue to benefit from acute immediate OT services to address the following goals 3-5x/week to  w/in 7-10 days:        OT Discharge Recommendation: Home with skilled therapy(home OT)  OT - OK to Discharge: Yes(When medically appropriate)

## 2021-02-23 NOTE — PHYSICAL THERAPY NOTE
Physical Therapy Treatment Note       02/23/21 0900   PT Last Visit   PT Visit Date 02/23/21   Note Type   Note type Evaluation   Pain Assessment   Pain Assessment Tool 0-10   Pain Score 2   Pain Location/Orientation Location: Generalized   Patient's Stated Pain Goal No pain   Hospital Pain Intervention(s) Ambulation/increased activity   Home Living   Type of Home House   Additional Comments Resides w/  in 1 story home, 2 LUDWIN  Indep self care, ambulates w/ RW   was admitted from rehab, but wants to d/c home w/ home therapy   Prior Function   Level of [place] Independent with ADLs and functional mobility   Falls in the last 6 months   (unknown)   Restrictions/Precautions   Weight Bearing Precautions Per Order No   Other Precautions Chair Alarm; Bed Alarm;Multiple lines   General   Family/Caregiver Present No   Cognition   Overall Cognitive Status WFL   Arousal/Participation Responsive   Orientation Level Oriented X4   Memory Unable to assess   Following Commands Follows one step commands without difficulty   RLE Assessment   RLE Assessment   (strength grossly 4-/5)   LLE Assessment   LLE Assessment   (strength grossly 4-/5)   Coordination   Movements are Fluid and Coordinated 1   Transfers   Sit to Stand 5  Supervision   Additional items Assist x 1   Stand to Sit 5  Supervision   Additional items Assist x 1   Ambulation/Elevation   Gait pattern   (slow, short step length)   Gait Assistance   (CGA/S)   Additional items Assist x 1   Assistive Device Rolling walker   Distance 50'x1, standing rest x 1-2 min   Balance   Static Sitting Normal   Dynamic Sitting Good   Static Standing Fair +   Dynamic Standing Fair +   Ambulatory Fair +   Endurance Deficit   Endurance Deficit Yes   Endurance Deficit Description fatigue, weakness, pain   Activity Tolerance   Activity Tolerance Patient limited by fatigue;Patient limited by pain;Treatment limited secondary to medical complications (Comment)   Nurse Made Aware yes Assessment   Prognosis Good   Problem List Decreased strength;Decreased endurance; Impaired balance;Decreased mobility;Pain   Assessment Pt seen for high complexity physical therapy evaluation  Pt is an 80 y/o female w/ history/comorbidities of recent MI, CAD, CABG, HTN, a-fib, pacemaker, seizures, CHF, Sjogren's who is now admitted as a transfer from rehab  Admitted w/ c/o chest pain- undergoing w/u as to the cause  Due to acute medical issues, unclear medical dx, need for hospital re-admit, pain, fall risk, note unstable clinical picture  PT consulted to assess mobility, d/c needs  Pt presents w/ decreased functional mob, standing balance, endurance, B LE strength, barriers at home  will benefit from skilled PT to correct for the above problems  In speaking w/ pt, prefers d/c directly home w/ 's support, home PT  Pt reports distance ambulated during eval is more than she would need to do at home, and  can assist as needed  Given same, agree w/ d/c home w/ home PT,  support  Goals   Patient Goals to go home today   STG Expiration Date 03/09/21   Short Term Goal #1 1-2 wks: bed mob and transfers w/ indep, standing balance to good/normal w/ device, ambulate 100-150 ft w/ RW and S, increase B LE strength by 1/2 -1 grade, ambulate 2 LUDWIN w/ S   PT Treatment Day 0   Plan   Treatment/Interventions Functional transfer training;LE strengthening/ROM; Elevations; Therapeutic exercise; Endurance training;Patient/family training;Equipment eval/education; Bed mobility;Gait training   PT Frequency Other (Comment)  (3-5x/wk)   Recommendation   PT Discharge Recommendation Return to previous environment with social support;Home with skilled therapy  (home w/ , home PT)   Equipment Recommended Heather Knutson  (if she does not already have one)   Radha Murray walker   Change/add to [place]?  No   PT - OK to Discharge Yes  (when stable to home w/  and home PT, ? RW) AM-PAC Basic Mobility Inpatient   Turning in Bed Without Bedrails 3   Lying on Back to Sitting on Edge of Flat Bed 3   Moving Bed to Chair 3   Standing Up From Chair 3   Walk in Room 3   Climb 3-5 Stairs 3   Basic Mobility Inpatient Raw Score 18   Basic Mobility Standardized Score 41 05   Len Cook PT, DPT CSRS

## 2021-02-23 NOTE — PLAN OF CARE
Problem: PHYSICAL THERAPY ADULT  Goal: Performs mobility at highest level of function for planned discharge setting  See evaluation for individualized goals  Description: Treatment/Interventions: Functional transfer training, LE strengthening/ROM, Elevations, Therapeutic exercise, Endurance training, Patient/family training, Equipment eval/education, Bed mobility, Gait training  Equipment Recommended: (S) Walker(if she does not already have one)       See flowsheet documentation for full assessment, interventions and recommendations  Note: Prognosis: Good  Problem List: Decreased strength, Decreased endurance, Impaired balance, Decreased mobility, Pain  Assessment: Pt seen for high complexity physical therapy evaluation  Pt is an 78 y/o female w/ history/comorbidities of recent MI, CAD, CABG, HTN, a-fib, pacemaker, seizures, CHF, Sjogren's who is now admitted as a transfer from rehab  Admitted w/ c/o chest pain- undergoing w/u as to the cause  Due to acute medical issues, unclear medical dx, need for hospital re-admit, pain, fall risk, note unstable clinical picture  PT consulted to assess mobility, d/c needs  Pt presents w/ decreased functional mob, standing balance, endurance, B LE strength, barriers at home  will benefit from skilled PT to correct for the above problems  In speaking w/ pt, prefers d/c directly home w/ 's support, home PT  Pt reports distance ambulated during eval is more than she would need to do at home, and  can assist as needed  Given same, agree w/ d/c home w/ home PT,  support  PT Discharge Recommendation: Return to previous environment with social support, Home with skilled therapy(home w/ , home PT)     PT - OK to Discharge: (S) Yes(when stable to home w/  and home PT, ? RW)    See flowsheet documentation for full assessment

## 2021-02-23 NOTE — ASSESSMENT & PLAN NOTE
Patient had a recent admission for NSTEMI status post cardiac catheterization with LAMINE to proximal LAD and LM 1/15/21  Has a history of CABG  On prasugrel, not on aspirin due to Xarelto  Statin  Comes in with midsternal chest pressure at rest  Cardiac workup is negative    Cardiology evaluation appreciated patient is cleared by cardiology for discharge outpatient follow-up

## 2021-02-23 NOTE — OCCUPATIONAL THERAPY NOTE
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)    A-fib (HCC)    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HCC)    Seizure-like activity ([facility] [address] )    Hyperlipidemia    Past Medical History  Past Medical History:   Diagnosis Date    Anal fissure     Cardiac disorder     Cognitive changes 12/23/2020    Esophageal reflux     Esophagitis, reflux     Hemorrhoids     Hepatic hemangioma     Last Assessed: 1/13/2015    Herpes zoster     History of colonic polyps     Hypertension     Ischemic colitis ([facility] [address] )     Lumbar herniated disc     Malignant neoplasm without specification of site ([facility] [address] )     Nephrolithiasis     L  Lithotripsy    Nontoxic single thyroid nodule     Last Assessed: 1/13/2015    Osteoarthritis     Overactive bladder     Raynaud disease     Respiratory system disease     Sjogren's disease ([facility] [address] )     Spinal stenosis     PONCHO (stress urinary incontinence, female)     Uterovaginal prolapse     Grade I-II     Past Surgical History  Past Surgical History:   Procedure Laterality Date    APPENDECTOMY  1947    CARDIAC SURGERY      CABG    CATARACT EXTRACTION Bilateral     COLONOSCOPY  2012    Fiberoptic    COLONOSCOPY      Resolved: 2006 - 2012 5 year f/u    CORONARY ANGIOPLASTY WITH STENT PLACEMENT      CORONARY ARTERY BYPASS GRAFT      Resolved: 2012    ESOPHAGOGASTRODUODENOSCOPY  2012    Diagnostic    HEMORROIDECTOMY      KNEE SURGERY      LITHOTRIPSY      Renal    MALIGNANT SKIN LESION EXCISION      Face; Resolved: 2004    WI ESOPHAGOGASTRODUODENOSCOPY TRANSORAL DIAGNOSTIC N/A 4/13/2016    Procedure: EGD AND COLONOSCOPY;  Surgeon: Bernardino Vitale MD;  [facility];   Service: Gastroenterology    RENAL ARTERY STENT      SKIN LESION EXCISION      Scalp    SOFT TISSUE TUMOR RESECTION      Shoulder; Resolved: 1995    THROMBOLYSIS      Postoperative Thrombolysis PTCA    TONSILLECTOMY      Resolved: 1944 02/23/21 7395   OT Last Visit   OT Visit Date 02/23/21   Note Type   Note type Evaluation   Restrictions/Precautions   Weight Bearing Precautions Per Order No   Other Precautions Multiple lines;Telemetry; Fall Risk   Pain Assessment   Pain Assessment Tool [facility] Pain Intervention(s) Repositioned; Ambulation/increased activity; Emotional support   Pain Rating: FLACC (Rest) - Face 0   Pain Rating: FLACC (Rest) - Legs 0   Pain Rating: FLACC (Rest) - Activity 0   Pain Rating: FLACC (Rest) - Cry 0   Pain Rating: FLACC (Rest) - Consolability 0   Score: FLACC (Rest) 0   Pain Rating: FLACC (Activity) - Face 1   Pain Rating: FLACC (Activity) - Legs 0   Pain Rating: FLACC (Activity) - Activity 0   Pain Rating: FLACC (Activity) - Cry 1   Pain Rating: FLACC (Activity) - Consolability 0   Score: FLACC (Activity) 2   Home Living   Type of Home House   Home Layout One level;Stairs to enter with rails   Bathroom Shower/Tub Walk-in shower   Bathroom Toilet Standard   Bathroom Equipment Shower chair   [address] [place]   Additional Comments Pt presents this admission from rehab  Before rehab pt was living in a 1 story home with 2 LUDWIN      Prior Function   Level of [place] Independent with ADLs and functional mobility   Lives With Spouse   Receives Help From Family   ADL Assistance Independent   IADLs Independent   Falls in the last 6 months 0  (per pt report)   Vocational Retired   Lifestyle   Autonomy Pt reports that at baseline she is I with ADLS, IADLS and mobility with RW  (+)     Reciprocal Relationships Ptl celestino with her  who she reports is able to provide 24/7 assist upon d/c    Service to Others Retired   [address] Enjoys golfing with her    ADL   Where Assessed Chair   Eating Assistance 7  Independent   Grooming Assistance 5  Supervision/Setup UB Bathing Assistance 5  Supervision/Setup   LB Bathing Assistance 3  Moderate Assistance   UB Dressing Assistance 5  Supervision/Setup   LB Dressing Assistance 3  Moderate Assistance   Toileting Assistance  4  Minimal Assistance   Bed Mobility   Additional Comments Unable to assess, pt seated OOB in chair upon OT arrival  After OT session pt in chair with all needs within reach  Transfers   Sit to Stand 5  Supervision   Additional items Increased time required   Stand to Sit 5  Supervision   Additional items Increased time required   Functional Mobility   Functional Mobility 4  Minimal assistance   Additional Comments Pt demonstrated short household mobility with RW  Pt reports that at home she doesn't walk very far  Additional items Rolling walker   Balance   Static Sitting Good   Dynamic Sitting Fair +   Static Standing Fair   Dynamic Standing Fair -   Ambulatory Poor +   Activity Tolerance   Activity Tolerance Patient limited by fatigue   Medical Staff Made Aware PT Singh Zhu   Nurse Made Aware RN confirmed okay to see pt   Cognition   Overall Cognitive Status Impaired   Arousal/Participation Alert; Cooperative   Attention Attends with cues to redirect   Orientation Level Oriented X4   Memory Decreased recall of precautions   Following Commands Follows one step commands without difficulty   Comments Pt reports that she has trouble remembering recent events such as how long she was at rehab  Pt is overall very pleasant and cooperative  Assessment   Limitation Decreased ADL status; Decreased endurance;Decreased high-level ADLs   Prognosis Good   Assessment Pt is a 79 y o  female admitted to [facility] on 2/22/2021 w/ chest pain   Comorbidities include a h/o Anal fissure, Cardiac disorder, Cognitive changes (12/23/2020), Esophageal reflux, Esophagitis, reflux, Hemorrhoids, Hepatic hemangioma, Herpes zoster, History of colonic polyps, Hypertension, Ischemic colitis ([facility] [address] ), Lumbar herniated disc, Malignant neoplasm without specification of site [facility]), Nephrolithiasis, Nontoxic single thyroid nodule, Osteoarthritis, Overactive bladder, Raynaud disease, Respiratory system disease, Sjogren's disease ([address] ), Spinal stenosis, PONCHO (stress urinary incontinence, female), and Uterovaginal prolapse  Pt with active OT orders and ambulate orders  Pt presents this admission from rehab  Before rehab pt was living in a 1 story home with 2 LUDWIN  Pt lives with her  who she reports is able to provide 24/7 assist upon d/c  Pt reports that at baseline she was I w/  ADLS and IADLS, (+) drove, & required use of RW PTA  Currently pt is supervision for functional transfers, min A for functional mobility, min A for UB ADLS and mod A for LB ADLS  Pt is limited at this time 2*: endurance, activity tolerance, functional mobility, forward functional reach, functional standing tolerance and decreased I w/ ADLS/IADLS  The following Occupational Performance Areas to address include: grooming, bathing/shower, toilet hygiene, dressing, functional mobility and clothing management  Based on the aforementioned OT evaluation, functional performance deficits, and assessments, pt has been identified as a high complexity evaluation  From OT standpoint, anticipate d/c home OT  Pt to continue to benefit from acute immediate OT services to address the following goals 3-5x/week to  w/in 7-10 days: Julieta Houser Goals   Patient Goals To return home today   LTG Time Frame 7-10   Long Term Goal #1 See goals below   Plan   Treatment Interventions ADL retraining;Functional transfer training; Endurance training;Patient/family training;UE strengthening/ROM; Compensatory technique education;Continued evaluation; Energy conservation; Activityengagement   Goal Expiration Date 21   OT Frequency 3-5x/wk   Recommendation   OT Discharge Recommendation Home with skilled therapy  (home OT)   OT - OK to Discharge Yes  (When medically appropriate)   AM-PAC Daily Activity Inpatient Lower Body Dressing 2   Bathing 3   Toileting 3   Upper Body Dressing 3   Grooming 4   Eating 4   Daily Activity Raw Score 19   Daily Activity Standardized Score (Calc for Raw Score >=11) 40 22   Modified [place] Scale   Modified [place] Scale 4       GOALS    1) Pt will increase activity tolerance to G for 30 min txment sessions    2) Pt will complete UB/LB dressing/self care w/ mod I using adaptive device and DME as needed    3) Pt will complete bathing w/ Mod I w/ use of AE and DME as needed    4) Pt will complete toileting w/ mod I w/ G hygiene/thoroughness using DME as needed    5) Pt will improve functional transfers to Mod I on/off all surfaces using DME as needed w/ G balance/safety     6) Pt will improve functional mobility during ADL/IADL/leisure tasks to Mod I using DME as needed w/ G balance/safety     7) Pt will participate in simulated IADL management task with DME as needed to increase independence to  w/ G safety and endurance    8) Pt will demonstrate G carryover of pt/caregiver education and training as appropriate  9) Pt will demonstrate 100% carryover of energy conservation techniques t/o functional I/ADL/leisure tasks w/o cues s/p skilled education    10) Pt will independently identify and utilize 2-3 coping strategies to increase positive affect and promote overall well-being      11) Pt will engage in ongoing cognitive assessment w/ G participation to assist w/ safe d/c planning/recommendations    REBECCA Gutierres, OTR/L

## 2021-02-23 NOTE — UTILIZATION REVIEW
Initial Clinical Review    Admission: Date/Time/Statement:   Admission Orders (From admission, onward)     Ordered        02/22/21 0751  Place in Observation  Once                   Orders Placed This Encounter   Procedures    Place in Observation     Standing Status:   Standing     Number of Occurrences:   1     Order Specific Question:   Level of Care     Answer:   Med Surg [16]     ED Arrival Information     Expected Arrival Acuity Means of Arrival Escorted By Service Admission Type    - 2/22/2021 06:39 Urgent Ambulance [facility] Hospitalist Urgent    Arrival Complaint    chest pain        Chief Complaint   Patient presents with    Chest Pain     [facility] of [facility]  pt was walking to the bathroom and had chest pain radiating into the left arm  pt had X3 nitro at facility and 324 of aspirin pre hospital      Assessment/Plan: 79year old female, presented to the ED @ [facility] from [employer] [facility] via EMS  Admitted as Observation due to chest pain  This AM woke with chest pain  Initial trop WNL, aidan trops  ECG no acute ischemic changes  Monitor on telemetry  Consult Cardio  02/22/2021  Consult Cardio:  Continue to trend trop  Continue to monitor symptoms  Continue effient, statin, BB  Interrogate PPM   Bumex  Monitor I&O  Daily weights        ED Triage Vitals [02/22/21 0643]   Temperature Pulse Respirations Blood Pressure SpO2   98 5 °F (36 9 °C) 87 18 118/56 98 %      Temp Source Heart Rate Source Patient Position - Orthostatic VS BP Location FiO2 (%)   Oral Monitor Sitting Right arm --      Pain Score       No Pain          Wt Readings from Last 1 Encounters:   02/12/21 58 2 kg (128 lb 4 9 oz)     Additional Vital Signs:   Date/Time  Temp  Pulse  Resp  BP  MAP (mmHg)  SpO2  O2 Device  Patient Position - Orthostatic VS   02/23/21 07:56:44  97 3 °F (36 3 °C)Abnormal   87  --  94/60  71  96 %  --  --   02/23/21 03:03:26 98 4 °F (36 9 °C)  76  18  98/64  75  97 %  --  --   21 23:59:47  98 2 °F (36 8 °C)  83  16  157/78  104  96 %  --  Lying   21  --  --  --  --  --  --  None (Room air)  --   21 19:23:31  --  79  --  96/62  73  98 %  --  --   21 19:04:28  97 5 °F (36 4 °C)  81  16  80/56Abnormal    64  95 %  --  --   BP: notified RN at 21 1904   21 1417  97 5 °F (36 4 °C)  82  16  139/58  85  96 %  None (Room air)  Lying   21 13:32:07  --  78  --  152/58  89  97 %  --  --   21 1332  --  76  --  152/58  --  --  --  --   21 12:10:11  98 5 °F (36 9 °C)  --  16  99/58  72  98 %  --  --   21 1022  --  79  16  159/71  --  99 %  None (Room air)  Lying     2021 @ 0805  Chest X:  No acute cardiopulmonary disease       2021 @ 1009  EC, AV dual paced rhythm    Pertinent Labs/Diagnostic Test Results:     Results from last 7 days   Lab Units 21  0526 21  0646   WBC Thousand/uL 5 58 6 18   HEMOGLOBIN g/dL 11 5 11 6   HEMATOCRIT % 37 4 37 3   PLATELETS Thousands/uL 207 231   NEUTROS ABS Thousands/µL 1 96 1 74*     Results from last 7 days   Lab Units 21  0526 21  0646   SODIUM mmol/L 140 139   POTASSIUM mmol/L 3 6 3 7   CHLORIDE mmol/L 104 105   CO2 mmol/L 31 29   ANION GAP mmol/L 5 5   BUN mg/dL 11 12   CREATININE mg/dL 0 80 0 80   EGFR ml/min/1 73sq m 69 69   CALCIUM mg/dL 9 2 9 3   MAGNESIUM mg/dL 2 2  --      Results from last 7 days   Lab Units 21  0646   AST U/L 52*   ALT U/L 35   ALK PHOS U/L 68   TOTAL PROTEIN g/dL 6 9   ALBUMIN g/dL 3 1*   TOTAL BILIRUBIN mg/dL 0 99     Results from last 7 days   Lab Units 21  0526 21  0646   GLUCOSE RANDOM mg/dL 124 98     Results from last 7 days   Lab Units 21  1847 21  1359 21  1017 21  0646   TROPONIN I ng/mL 0 03 0 04 0 04 0 04     ED Treatment:   Medication Administration from 2021 0639 to 2021 1204     None        Past Medical History: Diagnosis Date    Anal fissure     Cardiac disorder     Cognitive changes 12/23/2020    Esophageal reflux     Esophagitis, reflux     Hemorrhoids     Hepatic hemangioma     Last Assessed: 1/13/2015    Herpes zoster     History of colonic polyps     Hypertension     Ischemic colitis ([place] [address] )     Lumbar herniated disc     Malignant neoplasm without specification of site ([place] [address] )     Nephrolithiasis     L  Lithotripsy    Nontoxic single thyroid nodule     Last Assessed: 1/13/2015    Osteoarthritis     Overactive bladder     Raynaud disease     Respiratory system disease     Sjogren's disease ([place] [address] )     Spinal stenosis     PONCHO (stress urinary incontinence, female)     Uterovaginal prolapse     Grade I-II     Present on Admission:   A-fib ([place] [address] )   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[place] [address] )   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)   Seizure-like activity (HCC)   Hyperlipidemia      Admitting Diagnosis: Chest pain [R07 9]  Age/Sex: 79 y o  female  Admission Orders:  Scheduled Medications:  amiodarone, 200 mg, Oral, Daily With Breakfast  atorvastatin, 20 mg, Oral, Daily With Dinner  bumetanide, 2 mg, Oral, Daily  docusate sodium, 100 mg, Oral, BID  levETIRAcetam, 500 mg, Oral, Q12H SHELLEY  magnesium oxide, 400 mg, Oral, BID before lunch/dinner  metoprolol succinate, 25 mg, Oral, Daily  pantoprazole, 40 mg, Oral, Early Morning  potassium chloride, 40 mEq, Oral, Daily  prasugrel, 10 mg, Oral, Daily  rivaroxaban, 15 mg, Oral, Daily With Dinner      Continuous IV Infusions:     PRN Meds:  acetaminophen, 650 mg, Oral, Q6H PRN  ammonium lactate, , Topical, BID PRN  nitroglycerin, 0 4 mg, Sublingual, Q5 Min PRN      Consult PT/OT  IP CONSULT TO CARDIOLOGY    Network Utilization Review Department  ATTENTION: Please call with any questions or concerns to [phone] and carefully listen to the prompts so that you are directed to the right person   All voicemails are confidential   Marlena Spence all requests for admission clinical reviews, approved or denied determinations and any other requests to dedicated fax number below belonging to the campus where the patient is receiving treatment   List of dedicated fax numbers for the Facilities:  [address] [place] [address] DENIALS (Administrative/Medical Necessity) [phone]   [address] (Maternity/NICU/Pediatrics) [phone] [address] Mel Syed 6430 (  [facility]anny Morales [facility] "Kryst[facility]" 103) [address] [address] Uvaldo Clark 1481 [address] [place] [address]) [address] [place], [place] [address] [phone]

## 2021-02-23 NOTE — ASSESSMENT & PLAN NOTE
Wt Readings from Last 3 Encounters:   02/12/21 58 2 kg (128 lb 4 9 oz)   01/27/21 70 kg (154 lb 4 8 oz)   01/21/21 63 7 kg (140 lb 6 4 oz)       Seems euvolemic,Continue Bumex  Low-sodium diet, and fluid restriction

## 2021-02-23 NOTE — TELEPHONE ENCOUNTER
fyi- may want to make a note on the appt to check - I did not do a LEON call since she is in the hospital   Patient is currently in the hospital

## 2021-02-23 NOTE — DISCHARGE SUMMARY
Discharge- Sharan Chatman 1940, 79 y o  female MRN: [id number]    Unit/Bed#: [facility] 429-01 Encounter: [id number]    Primary Care Provider: Charbel Knapp MD   Date and time admitted to hospital: 2/22/2021  6:40 AM        * Chest pain  Assessment & Plan  Patient had a recent admission for NSTEMI status post cardiac catheterization with LAMINE to proximal LAD and LM 1/15/21  Has a history of CABG  On prasugrel, not on aspirin due to Xarelto  Statin  Comes in with midsternal chest pressure at rest  Cardiac workup is negative  Cardiology evaluation appreciated patient is cleared by cardiology for discharge outpatient follow-up    A-fib [facility])  Assessment & Plan  Rate controlled, continue amiodarone and metoprolol  Continue Xarelto    Hyperlipidemia  Assessment & Plan  Continue statin    Seizure-like activity ([facility] [address] )  Assessment & Plan  On her previous admission patient has seizure-like activities  Evaluated by Neurology  She had an abnormal EEG which showed mild diffuse cerebral dysfunction with superimposed left temporal focal cerebral dysfunction      Continue Keppra    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[facility] [address] )  Assessment & Plan  Tachy-jillian syndrome, 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)  Assessment & Plan  Wt Readings from Last 3 Encounters:   02/12/21 58 2 kg (128 lb 4 9 oz)   01/27/21 70 kg (154 lb 4 8 oz)   01/21/21 63 7 kg (140 lb 6 4 oz)       Seems euvolemic,Continue Bumex  Low-sodium diet, and fluid restriction        Discharging Physician / Practitioner: Winter Vargas MD  PCP: Charbel Knapp MD  Admission Date:   Admission Orders (From admission, onward)     Ordered        02/22/21 0751  Place in Observation  Once                   Discharge Date: 02/23/21    Resolved Problems  Date Reviewed: 2/23/2021    None          Consultations During Hospital Stay:  · cardiology    Procedures Performed:   ·     Significant Findings / Test Results:   · Chest x-ray-no acute cardiopulmonary disease    Incidental Findings:   ·     Test Results Pending at Discharge (will require follow up):   ·      Outpatient Tests Requested:  ·     Complications:   none    Reason for Admission: chest pain    Hospital Course:     Jordan Kumar is a 79 y o  female patient who originally presented to the hospital on 2/22/2021 due to chest pain  Patient was recently admitted to the hospital and had a low ablation secondary to NSTEMI status post stent placement  Patient was in the rehab and the blood chest   Patient was sent to the hospital from the rehab  Troponin came back negative patient was evaluated by Cardiology  Cardiology cleared the patient to be discharged home without any further testing since the troponin came back negative and she did not have any further chest pain during the hospital stay  Patient was actually in the process of getting discharged from the rehab to home yesterday before she was sent to the hospital   Patient and family wants to go home  Patient has VNA arranged as an outpatient  Patient remained hemodynamically stable and afebrile  No further chest pain  Patient was discharged in a stable condition on 2/23/2021  Recommended to follow-up with the Cardiology and Urology as before admission        Please see above list of diagnoses and related plan for additional information  Condition at Discharge: good     Discharge Day Visit / Exam:     Subjective:    Vitals: Blood Pressure: 150/63 (02/23/21 1116)  Pulse: 79 (02/23/21 1116)  Temperature: (!) 97 3 °F (36 3 °C) (02/23/21 1116)  Temp Source: Oral (02/22/21 2869)  Respirations: 12 (02/23/21 1116)  Height: 4' 10" (147 3 cm) (02/22/21 1417)  SpO2: 96 % (02/23/21 1116)  Exam:   Physical Exam  Constitutional:       General: She is not in acute distress  Appearance: Normal appearance  HENT:      Head: Normocephalic        Right Ear: Tympanic membrane normal       Nose: Nose normal    Eyes:      General: No scleral icterus  Neck:      Musculoskeletal: Normal range of motion  Cardiovascular:      Rate and Rhythm: Normal rate and regular rhythm  Pulses: Normal pulses  Pulmonary:      Effort: Pulmonary effort is normal    Abdominal:      General: Abdomen is flat  Skin:     General: Skin is warm  Neurological:      General: No focal deficit present  Mental Status: She is alert  Discussion with Family: family updated in the room    Discharge instructions/Information to patient and family:   See after visit summary for information provided to patient and family  Provisions for Follow-Up Care:  See after visit summary for information related to follow-up care and any pertinent home health orders  Disposition:     Home  ·     Planned Readmission:  none     Discharge Statement:  I spent  45 minutes discharging the patient  This time was spent on the day of discharge  I had direct contact with the patient on the day of discharge  Greater than 50% of the total time was spent examining patient, answering all patient questions, arranging and discussing plan of care with patient as well as directly providing post-discharge instructions  Additional time then spent on discharge activities  Discharge Medications:  See after visit summary for reconciled discharge medications provided to patient and family        ** Please Note: This note has been constructed using a voice recognition system **

## 2021-02-24 ENCOUNTER — APPOINTMENT (EMERGENCY)
Dept: RADIOLOGY | Facility: HOSPITAL | Age: 81
End: 2021-02-24
Payer: MEDICARE

## 2021-02-24 ENCOUNTER — HOSPITAL ENCOUNTER (EMERGENCY)
Facility: HOSPITAL | Age: 81
Discharge: HOME | End: 2021-02-24
Attending: EMERGENCY MEDICINE
Payer: MEDICARE

## 2021-02-24 VITALS
TEMPERATURE: 98 F | HEART RATE: 80 BPM | OXYGEN SATURATION: 100 % | SYSTOLIC BLOOD PRESSURE: 101 MMHG | RESPIRATION RATE: 18 BRPM | DIASTOLIC BLOOD PRESSURE: 58 MMHG

## 2021-02-24 DIAGNOSIS — R07.9 CHEST PAIN, UNSPECIFIED TYPE: Primary | ICD-10-CM

## 2021-02-24 LAB
ALBUMIN SERPL BCP-MCNC: 3.2 G/DL (ref 3.5–5)
ALP SERPL-CCNC: 61 U/L (ref 46–116)
ALT SERPL W P-5'-P-CCNC: 36 U/L (ref 12–78)
ANION GAP SERPL CALCULATED.3IONS-SCNC: 4 MMOL/L (ref 4–13)
AST SERPL W P-5'-P-CCNC: 74 U/L (ref 5–45)
ATRIAL RATE: 208 BPM
ATRIAL RATE: 77 BPM
BASOPHILS # BLD AUTO: 0.04 THOUSANDS/ΜL (ref 0–0.1)
BASOPHILS NFR BLD AUTO: 1 % (ref 0–1)
BILIRUB SERPL-MCNC: 0.99 MG/DL (ref 0.2–1)
BUN SERPL-MCNC: 15 MG/DL (ref 5–25)
CALCIUM ALBUM COR SERPL-MCNC: 9.5 MG/DL (ref 8.3–10.1)
CALCIUM SERPL-MCNC: 8.9 MG/DL (ref 8.3–10.1)
CHLORIDE SERPL-SCNC: 103 MMOL/L (ref 100–108)
CO2 SERPL-SCNC: 31 MMOL/L (ref 21–32)
CREAT SERPL-MCNC: 1.22 MG/DL (ref 0.6–1.3)
EOSINOPHIL # BLD AUTO: 0.1 THOUSAND/ΜL (ref 0–0.61)
EOSINOPHIL NFR BLD AUTO: 2 % (ref 0–6)
ERYTHROCYTE [DISTWIDTH] IN BLOOD BY AUTOMATED COUNT: 14.8 % (ref 11.6–15.1)
GFR SERPL CREATININE-BSD FRML MDRD: 42 ML/MIN/1.73SQ M
GLUCOSE SERPL-MCNC: 94 MG/DL (ref 65–140)
HCT VFR BLD AUTO: 36.7 % (ref 34.8–46.1)
HGB BLD-MCNC: 11.9 G/DL (ref 11.5–15.4)
IMM GRANULOCYTES # BLD AUTO: 0.02 THOUSAND/UL (ref 0–0.2)
IMM GRANULOCYTES NFR BLD AUTO: 0 % (ref 0–2)
LYMPHOCYTES # BLD AUTO: 3.21 THOUSANDS/ΜL (ref 0.6–4.47)
LYMPHOCYTES NFR BLD AUTO: 51 % (ref 14–44)
MCH RBC QN AUTO: 30.8 PG (ref 26.8–34.3)
MCHC RBC AUTO-ENTMCNC: 32.4 G/DL (ref 31.4–37.4)
MCV RBC AUTO: 95 FL (ref 82–98)
MONOCYTES # BLD AUTO: 0.48 THOUSAND/ΜL (ref 0.17–1.22)
MONOCYTES NFR BLD AUTO: 8 % (ref 4–12)
NEUTROPHILS # BLD AUTO: 2.33 THOUSANDS/ΜL (ref 1.85–7.62)
NEUTS SEG NFR BLD AUTO: 38 % (ref 43–75)
NRBC BLD AUTO-RTO: 0 /100 WBCS
P AXIS: -40 DEGREES
P AXIS: 31 DEGREES
PLATELET # BLD AUTO: 231 THOUSANDS/UL (ref 149–390)
PMV BLD AUTO: 10.4 FL (ref 8.9–12.7)
POTASSIUM SERPL-SCNC: 4 MMOL/L (ref 3.5–5.3)
PR INTERVAL: 96 MS
PROT SERPL-MCNC: 6.8 G/DL (ref 6.4–8.2)
QRS AXIS: 11 DEGREES
QRS AXIS: 134 DEGREES
QRSD INTERVAL: 122 MS
QRSD INTERVAL: 128 MS
QT INTERVAL: 356 MS
QT INTERVAL: 440 MS
QTC INTERVAL: 413 MS
QTC INTERVAL: 497 MS
RBC # BLD AUTO: 3.86 MILLION/UL (ref 3.81–5.12)
SODIUM SERPL-SCNC: 138 MMOL/L (ref 136–145)
T WAVE AXIS: 102 DEGREES
T WAVE AXIS: 240 DEGREES
TROPONIN I SERPL-MCNC: 0.03 NG/ML
TROPONIN I SERPL-MCNC: 0.03 NG/ML
VENTRICULAR RATE: 77 BPM
VENTRICULAR RATE: 81 BPM
WBC # BLD AUTO: 6.18 THOUSAND/UL (ref 4.31–10.16)

## 2021-02-24 PROCEDURE — 71046 X-RAY EXAM CHEST 2 VIEWS: CPT

## 2021-02-24 PROCEDURE — 84484 ASSAY OF TROPONIN QUANT: CPT | Performed by: EMERGENCY MEDICINE

## 2021-02-24 PROCEDURE — 93005 ELECTROCARDIOGRAM TRACING: CPT

## 2021-02-24 PROCEDURE — 85025 COMPLETE CBC W/AUTO DIFF WBC: CPT | Performed by: EMERGENCY MEDICINE

## 2021-02-24 PROCEDURE — 36415 COLL VENOUS BLD VENIPUNCTURE: CPT | Performed by: EMERGENCY MEDICINE

## 2021-02-24 PROCEDURE — 80053 COMPREHEN METABOLIC PANEL: CPT | Performed by: EMERGENCY MEDICINE

## 2021-02-24 PROCEDURE — 99285 EMERGENCY DEPT VISIT HI MDM: CPT

## 2021-02-24 PROCEDURE — 93010 ELECTROCARDIOGRAM REPORT: CPT | Performed by: INTERNAL MEDICINE

## 2021-02-24 PROCEDURE — 99285 EMERGENCY DEPT VISIT HI MDM: CPT | Performed by: EMERGENCY MEDICINE

## 2021-02-24 RX ORDER — PANTOPRAZOLE SODIUM 40 MG/1
40 TABLET, DELAYED RELEASE ORAL ONCE
Status: COMPLETED | OUTPATIENT
Start: 2021-02-24 | End: 2021-02-24

## 2021-02-24 RX ORDER — PANTOPRAZOLE SODIUM 40 MG/1
40 INJECTION, POWDER, FOR SOLUTION INTRAVENOUS ONCE
Status: DISCONTINUED | OUTPATIENT
Start: 2021-02-24 | End: 2021-02-24

## 2021-02-24 RX ADMIN — PANTOPRAZOLE SODIUM 40 MG: 40 TABLET, DELAYED RELEASE ORAL at 07:01

## 2021-02-24 NOTE — ED PROVIDER NOTES
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, admission to the hospital 2 days ago for an episode of chest pain and discharged from the hospital yesterday, presenting to the emergency department for evaluation of chest pain  Patient describes the chest pain as a pain, states that it was substernal, was nonradiating, had no alleviating or exacerbating factors, was fairly brief in duration, but she believes lasted for several minutes before dissipating  Pain occurred approximately an hour before arrival to the emergency department  Patient denies any associated symptoms including diaphoresis, nausea, shortness of breath, cough  Patient denies any new leg pain or swelling  Prior to Admission Medications   Prescriptions Last Dose Informant Patient Reported?  Taking?   acetaminophen (TYLENOL) 325 mg tablet   No Yes   Sig: Take 2 tablets (650 mg total) by mouth every 6 (six) hours as needed for mild pain   amiodarone 200 mg tablet   No Yes   Sig: Take 1 tablet (200 mg total) by mouth daily with breakfast   atorvastatin (LIPITOR) 20 mg tablet   No Yes   Sig: Take 1 tablet (20 mg total) by mouth daily with dinner   bumetanide (BUMEX) 2 mg tablet   No Yes   Sig: Take 1 tablet (2 mg total) by mouth daily   levETIRAcetam (KEPPRA) 500 mg tablet   No Yes   Sig: Take 1 tablet (500 mg total) by mouth every 12 (twelve) hours   magnesium oxide (MAG-OX) 400 mg   No Yes   Sig: Take 1 tablet (400 mg total) by mouth 2 (two) times a day before lunch and dinner   metoprolol succinate (TOPROL-XL) 25 mg 24 hr tablet   No Yes   Sig: Take 1 tablet (25 mg total) by mouth daily   multivitamin (THERAGRAN) TABS  Self Yes Yes   Sig: Take 1 tablet by mouth daily  nitroglycerin (NITROSTAT) 0 4 mg SL tablet   No Yes   Sig: Place 1 tablet (0 4 mg total) under the tongue every 5 (five) minutes as needed for chest pain   pantoprazole (PROTONIX) 40 mg tablet   No Yes   Sig: Take 1 tablet (40 mg total) by mouth daily in the early morning   potassium chloride (K-DUR,KLOR-CON) 20 mEq tablet   No Yes   Sig: Take 2 tablets (40 mEq total) by mouth daily   prasugrel (EFFIENT) tablet   No Yes   Sig: Take 1 tablet (10 mg total) by mouth daily   psyllium (METAMUCIL) packet   No Yes   Sig: Take 1 packet by mouth daily   rivaroxaban (XARELTO) 15 mg tablet   No Yes   Sig: Take 1 tablet (15 mg total) by mouth daily with dinner      Facility-Administered Medications: None       Past Medical History:   Diagnosis Date    Anal fissure     Cardiac disorder     Cognitive changes 12/23/2020    Esophageal reflux     Esophagitis, reflux     Hemorrhoids     Hepatic hemangioma     Last Assessed: 1/13/2015    Herpes zoster     History of colonic polyps     Hypertension     [facility] [address] [facility]     Lumbar herniated disc     Malignant neoplasm without specification of site ([facility] [address] [facility]     Nephrolithiasis     L   Lithotripsy    Nontoxic single thyroid nodule     Last Assessed: 1/13/2015    Osteoarthritis     Overactive bladder     Raynaud disease     Respiratory system disease     Sjogren's disease ([facility] [address] [facility]     Spinal stenosis     PONCHO (stress urinary incontinence, female)     Uterovaginal prolapse     Grade I-II       Past Surgical History:   Procedure Laterality Date    APPENDECTOMY  1947    CARDIAC SURGERY      CABG    CATARACT EXTRACTION Bilateral     COLONOSCOPY  2012    Fiberoptic    COLONOSCOPY      Resolved: 2006 - 2012 5 year f/u    CORONARY ANGIOPLASTY WITH STENT PLACEMENT      CORONARY ARTERY BYPASS GRAFT Resolved: 2012    ESOPHAGOGASTRODUODENOSCOPY  2012    Diagnostic    HEMORROIDECTOMY      KNEE SURGERY      LITHOTRIPSY      Renal    MALIGNANT SKIN LESION EXCISION      Face; Resolved: 2004    MD ESOPHAGOGASTRODUODENOSCOPY TRANSORAL DIAGNOSTIC N/A 4/13/2016    Procedure: EGD AND COLONOSCOPY;  Surgeon: Ada Owen MD;  [facility]; Service: Gastroenterology    RENAL ARTERY STENT      SKIN LESION EXCISION      Scalp    SOFT TISSUE TUMOR RESECTION      Shoulder; Resolved: 1995    THROMBOLYSIS      Postoperative Thrombolysis PTCA    TONSILLECTOMY      Resolved: 64       Family History   Problem Relation Age of Onset    Heart disease Mother     Hypertension Mother     Osteoporosis Mother     Heart disease Father     Hypertension Father     Ulcerative colitis Family     Colon polyps Family      I have reviewed and agree with the history as documented  E-Cigarette/Vaping     E-Cigarette/Vaping Substances    Nicotine No     THC No     CBD No     Flavoring No     Other No     Unknown No      Social History     Tobacco Use    Smoking status: Never Smoker    Smokeless tobacco: Never Used   Substance Use Topics    Alcohol use: Not Currently     Frequency: Monthly or less     Comment: social    Drug use: No       Review of Systems   Constitutional: Negative for diaphoresis and fever  HENT: Negative for congestion and drooling  Eyes: Negative for redness and visual disturbance  Respiratory: Negative for cough and shortness of breath  Cardiovascular: Positive for chest pain  Negative for leg swelling  Gastrointestinal: Negative for abdominal pain, diarrhea and vomiting  Genitourinary: Negative for difficulty urinating and dysuria  Musculoskeletal: Negative for neck pain and neck stiffness  Skin: Negative for color change and rash  Neurological: Negative for speech difficulty and headaches  All other systems reviewed and are negative        Physical Exam  Physical Exam  Vitals signs reviewed  Constitutional:       Appearance: She is well-developed  HENT:      Head: Normocephalic and atraumatic  Eyes:      Conjunctiva/sclera: Conjunctivae normal       Pupils: Pupils are equal, round, and reactive to light  Neck:      Musculoskeletal: Normal range of motion and neck supple  Cardiovascular:      Rate and Rhythm: Normal rate and regular rhythm  Heart sounds: Normal heart sounds  No murmur  Pulmonary:      Effort: Pulmonary effort is normal       Breath sounds: Normal breath sounds  Chest:      Comments: Pacemaker pocket in the left upper chest appears unremarkable  Abdominal:      Palpations: Abdomen is soft  Tenderness: There is no abdominal tenderness  Musculoskeletal: Normal range of motion  Comments: Bilateral lower extremity edema is noted  No calf tenderness  Negative Homans sign bilaterally  Skin:     General: Skin is warm and dry  Capillary Refill: Capillary refill takes less than 2 seconds  Neurological:      General: No focal deficit present  Mental Status: She is alert and oriented to person, place, and time  Motor: No weakness           Vital Signs  ED Triage Vitals   Temperature Pulse Respirations Blood Pressure SpO2   02/24/21 0600 02/24/21 0503 02/24/21 0503 02/24/21 0503 02/24/21 0503   98 °F (36 7 °C) 77 16 103/52 96 %      Temp src Heart Rate Source Patient Position - Orthostatic VS BP Location FiO2 (%)   -- 02/24/21 0503 02/24/21 0503 02/24/21 0503 --    Monitor Lying Right arm       Pain Score       --                  Vitals:    02/24/21 0503 02/24/21 0645 02/24/21 0807 02/24/21 0915   BP: 103/52 116/56 107/60 101/58   Pulse: 77 78 77 80   Patient Position - Orthostatic VS: Lying  Lying Lying         Visual Acuity      ED Medications  Medications   pantoprazole (PROTONIX) EC tablet 40 mg (40 mg Oral Given 2/24/21 0701)       Diagnostic Studies  Results Reviewed     Procedure Component Value Units Date/Time    Troponin I [[id number]]  (Normal) Collected: 02/24/21 0914    Lab Status: Final result Specimen: Blood from Hand, Right Updated: 02/24/21 1002     Troponin I 0 03 ng/mL     Comprehensive metabolic panel [[id number]]  (Abnormal) Collected: 02/24/21 0636    Lab Status: Final result Specimen: Blood from Arm, Left Updated: 02/24/21 0725     Sodium 138 mmol/L      Potassium 4 0 mmol/L      Chloride 103 mmol/L      CO2 31 mmol/L      ANION GAP 4 mmol/L      BUN 15 mg/dL      Creatinine 1 22 mg/dL      Glucose 94 mg/dL      Calcium 8 9 mg/dL      Corrected Calcium 9 5 mg/dL      AST 74 U/L      ALT 36 U/L      Alkaline Phosphatase 61 U/L      Total Protein 6 8 g/dL      Albumin 3 2 g/dL      Total Bilirubin 0 99 mg/dL      eGFR 42 ml/min/1 73sq m     Narrative:      National Kidney Disease Foundation guidelines for Chronic Kidney Disease (CKD):     Stage 1 with normal or high GFR (GFR > 90 mL/min/1 73 square meters)    Stage 2 Mild CKD (GFR = 60-89 mL/min/1 73 square meters)    Stage 3A Moderate CKD (GFR = 45-59 mL/min/1 73 square meters)    Stage 3B Moderate CKD (GFR = 30-44 mL/min/1 73 square meters)    Stage 4 Severe CKD (GFR = 15-29 mL/min/1 73 square meters)    Stage 5 End Stage CKD (GFR <15 mL/min/1 73 square meters)  Note: GFR calculation is accurate only with a steady state creatinine    Troponin I [[id number]]  (Normal) Collected: 02/24/21 0636    Lab Status: Final result Specimen: Blood from Arm, Left Updated: 02/24/21 0717     Troponin I 0 03 ng/mL     CBC and differential [[id number]]  (Abnormal) Collected: 02/24/21 0636    Lab Status: Final result Specimen: Blood from Arm, Left Updated: 02/24/21 0654     WBC 6 18 Thousand/uL      RBC 3 86 Million/uL      Hemoglobin 11 9 g/dL      Hematocrit 36 7 %      MCV 95 fL      MCH 30 8 pg      MCHC 32 4 g/dL      RDW 14 8 %      MPV 10 4 fL      Platelets 971 Thousands/uL      nRBC 0 /100 WBCs      Neutrophils Relative 38 %      Immat GRANS % 0 % Lymphocytes Relative 51 %      Monocytes Relative 8 %      Eosinophils Relative 2 %      Basophils Relative 1 %      Neutrophils Absolute 2 33 Thousands/µL      Immature Grans Absolute 0 02 Thousand/uL      Lymphocytes Absolute 3 21 Thousands/µL      Monocytes Absolute 0 48 Thousand/µL      Eosinophils Absolute 0 10 Thousand/µL      Basophils Absolute 0 04 Thousands/µL                  XR chest 2 views   ED Interpretation by Marilee Pringle MD (02/24 4870)   No acute cardiopulmonary disease  Hyperinflated lungs  Final Result by Laura Everett MD (02/24 3485)      Trace effusions  Workstation performed: [id number]                    Procedures  ECG 12 Lead Documentation Only    Date/Time: 2/24/2021 5:30 AM  Performed by: Marilee Pringle MD  Authorized by: Marilee Pringle MD     Indications / Diagnosis:  Chest pain  Patient location:  ED  Previous ECG:     Previous ECG:  Compared to current  Interpretation:     Interpretation: abnormal    Rate:     ECG rate assessment: normal    Rhythm:     Rhythm: paced    Pacing:     Capture:  Complete    Type of pacing:  Ventricular  QRS:     QRS axis:  Left  ST segments:     ST segments:  Normal  T waves:     T waves: non-specific               ED Course  ED Course as of Feb 24 2305   Wed Feb 24, 2021   0639 IV access was unable to be obtained by EMS  Two nurses attempted IV access without success  I attempted ultrasound-guided basilic vein cannulation x2 without success  Patient states that she does not want to stay in the hospital   Patient repeatedly states that she wants to go home  I told the patient that that would be okay, but I would make her sign out against medical advice  Patient was willing to undergo straight stick for blood  Plan is delta troponin, delta EKG                  HEART Risk Score      Most Recent Value   Heart Score Risk Calculator   History  0 Filed at: 02/24/2021 2304   ECG  0 Filed at: 02/24/2021 2304   Age  2 Filed at: 02/24/2021 2304   Risk Factors  2 Filed at: 02/24/2021 2304   Troponin  0 Filed at: 02/24/2021 2304   HEART Score  4 Filed at: 02/24/2021 2304         informed decision making with the patient  Given the fact that she had recent angiography with stenting, no findings of ST-elevation MI to suggest stent closure, recent admission 2 days ago with cardiac rule out, I believe that there is is very low likelihood of being cardiac  MDM    Disposition  Final diagnoses:   Chest pain, unspecified type     Time reflects when diagnosis was documented in both MDM as applicable and the Disposition within this note     Time User Action Codes Description Comment    2/24/2021  7:12 AM Markricardo Oconnell Add [R07 9] Chest pain, unspecified type       ED Disposition     ED Disposition Condition Date/Time Comment    Discharge Stable Wed Feb 24, 2021 10:05 AM Marko Rossa discharge to home/self care              Follow-up Information     Follow up With Specialties Details Why Contact Info    Latoya Barrios MD Family Medicine Schedule an appointment as soon as possible for a visit   [address]  [phone]            Discharge Medication List as of 2/24/2021 10:05 AM      CONTINUE these medications which have NOT CHANGED    Details   acetaminophen (TYLENOL) 325 mg tablet Take 2 tablets (650 mg total) by mouth every 6 (six) hours as needed for mild pain, Starting Sat 2/13/2021, No Print      amiodarone 200 mg tablet Take 1 tablet (200 mg total) by mouth daily with breakfast, Starting Tue 2/23/2021, Normal      atorvastatin (LIPITOR) 20 mg tablet Take 1 tablet (20 mg total) by mouth daily with dinner, Starting Tue 2/23/2021, Normal      bumetanide (BUMEX) 2 mg tablet Take 1 tablet (2 mg total) by mouth daily, Starting Tue 2/23/2021, Normal      levETIRAcetam (KEPPRA) 500 mg tablet Take 1 tablet (500 mg total) by mouth every 12 (twelve) hours, Starting Tue 2/23/2021, Normal      magnesium oxide (MAG-OX) 400 mg Take 1 tablet (400 mg total) by mouth 2 (two) times a day before lunch and dinner, Starting Sat 2/13/2021, No Print      metoprolol succinate (TOPROL-XL) 25 mg 24 hr tablet Take 1 tablet (25 mg total) by mouth daily, Starting Tue 2/23/2021, Normal      multivitamin (THERAGRAN) TABS Take 1 tablet by mouth daily  , Historical Med      nitroglycerin (NITROSTAT) 0 4 mg SL tablet Place 1 tablet (0 4 mg total) under the tongue every 5 (five) minutes as needed for chest pain, Starting Tue 2/23/2021, Normal      pantoprazole (PROTONIX) 40 mg tablet Take 1 tablet (40 mg total) by mouth daily in the early morning, Starting Tue 2/23/2021, Normal      potassium chloride (K-DUR,KLOR-CON) 20 mEq tablet Take 2 tablets (40 mEq total) by mouth daily, Starting Tue 2/23/2021, Normal      prasugrel (EFFIENT) tablet Take 1 tablet (10 mg total) by mouth daily, Starting Tue 2/23/2021, Normal      psyllium (METAMUCIL) packet Take 1 packet by mouth daily, Starting Sun 2/14/2021, No Print      rivaroxaban (XARELTO) 15 mg tablet Take 1 tablet (15 mg total) by mouth daily with dinner, Starting Tue 2/23/2021, Normal           No discharge procedures on file      PDMP Review     None          ED Provider  Electronically Signed by           Amanda Matos MD  02/24/21 8583       Amanda Matos MD  02/24/21 7496

## 2021-02-24 NOTE — ED RE-EVALUATION NOTE
Patient is currently pain free  She had repeat EKG showing paced rhythm  Patient also with stable troponin at 0 03    Patient will be discharged home follow-up with her cardiologist      Mathieu Griffin MD  02/24/21 7913

## 2021-02-25 ENCOUNTER — PATIENT OUTREACH (OUTPATIENT)
Dept: FAMILY MEDICINE CLINIC | Facility: MEDICAL CENTER | Age: 81
End: 2021-02-25

## 2021-02-25 ENCOUNTER — TRANSITIONAL CARE MANAGEMENT (OUTPATIENT)
Dept: FAMILY MEDICINE CLINIC | Facility: MEDICAL CENTER | Age: 81
End: 2021-02-25

## 2021-02-25 NOTE — PROGRESS NOTES
Provided my name and number to patient and   Reviewed bundled program and agrees to participate  Pt lives in single story ranch with two steps to enter  Supportive family  Ambulates with use of walker  Receiving home health services from [facility]  Pt unable to drive   assists with transportation  Verified they have all medications in the home  Complies with low sodium diet  Has scale in home to weigh self  Agrees to next outreach in two weeks

## 2021-03-01 ENCOUNTER — CLINICAL SUPPORT (OUTPATIENT)
Dept: FAMILY MEDICINE CLINIC | Facility: MEDICAL CENTER | Age: 81
End: 2021-03-01
Payer: MEDICARE

## 2021-03-01 ENCOUNTER — TELEPHONE (OUTPATIENT)
Dept: FAMILY MEDICINE CLINIC | Facility: MEDICAL CENTER | Age: 81
End: 2021-03-01

## 2021-03-01 DIAGNOSIS — R39.9 URINARY TRACT INFECTION SYMPTOMS: Primary | ICD-10-CM

## 2021-03-01 DIAGNOSIS — N39.0 URINARY TRACT INFECTION WITHOUT HEMATURIA, SITE UNSPECIFIED: Primary | ICD-10-CM

## 2021-03-01 LAB
SL AMB  POCT GLUCOSE, UA: ABNORMAL
SL AMB LEUKOCYTE ESTERASE,UA: ABNORMAL
SL AMB POCT BILIRUBIN,UA: ABNORMAL
SL AMB POCT BLOOD,UA: ABNORMAL
SL AMB POCT KETONES,UA: ABNORMAL
SL AMB POCT NITRITE,UA: ABNORMAL
SL AMB POCT PH,UA: 5.5
SL AMB POCT SPECIFIC GRAVITY,UA: 1.01
SL AMB POCT URINE PROTEIN: ABNORMAL
SL AMB POCT UROBILINOGEN: ABNORMAL

## 2021-03-01 PROCEDURE — 87186 SC STD MICRODIL/AGAR DIL: CPT | Performed by: FAMILY MEDICINE

## 2021-03-01 PROCEDURE — 87077 CULTURE AEROBIC IDENTIFY: CPT | Performed by: FAMILY MEDICINE

## 2021-03-01 PROCEDURE — 87086 URINE CULTURE/COLONY COUNT: CPT | Performed by: FAMILY MEDICINE

## 2021-03-01 PROCEDURE — 81002 URINALYSIS NONAUTO W/O SCOPE: CPT

## 2021-03-01 RX ORDER — CIPROFLOXACIN 250 MG/1
250 TABLET, FILM COATED ORAL EVERY 12 HOURS SCHEDULED
Qty: 10 TABLET | Refills: 0 | Status: SHIPPED | OUTPATIENT
Start: 2021-03-01 | End: 2021-03-06

## 2021-03-01 NOTE — PROGRESS NOTES
See telephone encounter  C/o UTI symptoms  Difficult to ambulate so  dropped off an specimen   Ua dip is positive for Leks and RBC's   Message up to Dr Geraldo Eugene

## 2021-03-01 NOTE — TELEPHONE ENCOUNTER
Michael Poe from [address] [place] [address] [facility] called to say the pt is having frequency, burning, urgency with urination  Probably has a UTI  Wonders if pt needs abx- [facility]  Can inform patient of discission  Pt can't take anything with sulpha  Thank you

## 2021-03-01 NOTE — TELEPHONE ENCOUNTER
elisabeth- did NV with Mr Amauri Isbell bringing the specimen here   see Ua dip in house and will send out culture   S/w Maggie Corley  Aware we need to collect a specimen  He will  a container and collect at home  Difficult to ambulate , using a walker  Incontinent of urine, order placed  Will watch for results  Can not take Sulfa

## 2021-03-01 NOTE — TELEPHONE ENCOUNTER
Yes agree that UA   Shows likely infection     Will send an antibiotic for Macrobid  She will need a follow-up urinalysis and culture in 2 weeks

## 2021-03-03 LAB
BACTERIA UR CULT: ABNORMAL
BACTERIA UR CULT: ABNORMAL

## 2021-03-04 ENCOUNTER — TELEMEDICINE (OUTPATIENT)
Dept: GERIATRICS | Facility: OTHER | Age: 81
End: 2021-03-04
Payer: MEDICARE

## 2021-03-04 DIAGNOSIS — I10 ESSENTIAL HYPERTENSION: Chronic | ICD-10-CM

## 2021-03-04 DIAGNOSIS — I50.42 CHRONIC COMBINED SYSTOLIC AND DIASTOLIC CONGESTIVE HEART FAILURE (HCC): ICD-10-CM

## 2021-03-04 DIAGNOSIS — I49.5 TACHY-BRADY SYNDROME (HCC): ICD-10-CM

## 2021-03-04 DIAGNOSIS — I48.0 PAROXYSMAL ATRIAL FIBRILLATION (HCC): Primary | ICD-10-CM

## 2021-03-04 DIAGNOSIS — N39.0 URINARY TRACT INFECTION WITHOUT HEMATURIA, SITE UNSPECIFIED: ICD-10-CM

## 2021-03-04 DIAGNOSIS — I25.10 CORONARY ARTERY DISEASE INVOLVING NATIVE CORONARY ARTERY OF NATIVE HEART WITHOUT ANGINA PECTORIS: ICD-10-CM

## 2021-03-04 DIAGNOSIS — I73.9 PAD (PERIPHERAL ARTERY DISEASE) (HCC): ICD-10-CM

## 2021-03-04 DIAGNOSIS — R56.9 SEIZURE-LIKE ACTIVITY (HCC): ICD-10-CM

## 2021-03-04 PROCEDURE — 99344 HOME/RES VST NEW MOD MDM 60: CPT | Performed by: NURSE PRACTITIONER

## 2021-03-05 VITALS
SYSTOLIC BLOOD PRESSURE: 160 MMHG | DIASTOLIC BLOOD PRESSURE: 84 MMHG | WEIGHT: 123 LBS | RESPIRATION RATE: 16 BRPM | BODY MASS INDEX: 25.71 KG/M2 | TEMPERATURE: 97.6 F | HEART RATE: 76 BPM

## 2021-03-05 PROBLEM — N39.0 UTI (URINARY TRACT INFECTION): Status: ACTIVE | Noted: 2021-03-05

## 2021-03-05 NOTE — ASSESSMENT & PLAN NOTE
· Doing well  · Continue with metorpolol, amiodarone, and bumetanide  · Monitor BP  · F/u cardiology 3/15/21

## 2021-03-05 NOTE — ASSESSMENT & PLAN NOTE
· Pt family called PCP to report UTI symptoms  · UAC&S collected, showed bacteria  · PCP prescribed cipro  · encourage increased fluids  · PCP to f/u PRN  · [facility] RN in home with f/u as well

## 2021-03-05 NOTE — ASSESSMENT & PLAN NOTE
· Doing well  · S/p cardiac cath 1/11/21  · Cardiology recommends to continue with effient and xarelto  · F/u with PCP and/or Cardiology

## 2021-03-05 NOTE — ASSESSMENT & PLAN NOTE
· Doing well  · Asymptomatic  · No edema present  · Weights 120-123lb  · Cardiology f/u 3/15/21  · Continue with low Na diet  · Monitor weights daily

## 2021-03-05 NOTE — PROGRESS NOTES
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[facility] )     · Doing well  · Asymptomatic  · No edema present  · Weights 120-123lb  · Cardiology f/u 3/15/21  · Continue with low Na diet  · Monitor weights daily             CAD (coronary artery disease)     · Doing well  · S/p cardiac cath 1/11/21  · Cardiology recommends to continue with effient and xarelto  · F/u with PCP and/or Cardiology         A-fib [facility]) - Primary     · Doing well   · Rate controlled  · Continue with metoprolol and xarelto         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[facility] )     · Controlled  · Pacemaker implanted 1/19/21         PAD (peripheral artery disease) ([facility] )     · At baseline  · Continue with prasugrel and atorvastatin  · Already taking xarelto for Afib            Genitourinary    UTI (urinary tract infection)     · Pt family called PCP to report UTI symptoms  · UAC&S collected, showed bacteria  · PCP prescribed cipro  · encourage increased fluids  · PCP to f/u PRN  · [facility] RN in home with f/u as well            Other    Seizure-like activity ([facility] )     · Pt was evaluated by neurology previous hospital stay  · EEG showing mild diffuse cerebral dysfunction with left temporal focal cerebral dysfunction  · Pt currently taking keppra  · Followed by Neurology 4/2/21                    Reason for visit is   Chief Complaint   Patient presents with    Virtual Regular Visit        Encounter provider KATHY Barragan    Provider located at [address] [facility] VIRTUAL  [address] [place] [address]  [address]  [facility]  [phone]      Recent Visits  Date Type Provider Dept   03/04/21 Telemedicine KATHY Barragan  Nursing Home Vir   Showing recent visits within past 7 days and meeting all other requirements     Future Appointments  No visits were found meeting these conditions  Showing future appointments within next 150 days and meeting all other requirements        The patient was identified by name and date of birth  Mei Tran was informed that this is a telemedicine visit and that the visit is being conducted through [email or web address] and patient was informed that this is a secure, HIPAA-compliant platform  She agrees to proceed     My office door was closed  No one else was in the room  She acknowledged consent and understanding of privacy and security of the video platform  The patient has agreed to participate and understands they can discontinue the visit at any time  Patient is aware this is a billable service  Subjective  Mei Tran is a 79 y o  female with past medical history of CHF, HTN and afib who during short term rehab stay was sent to the hospital for chest pain  Pt was in hospital for low ablation secondary to NSTEMI s/p stent placement prior to STR  Pt was evaluated by cardiology and was cleared to be discharged directly to home  Pt did not have any further chest pains and troponins were negative  Pt lives at home with  who takes care of medications and all ADL and IADL assistance  Daughter lives 15min away and is involved in care  Pt ambulates in home with wheeled walker, PT/OT to evaluate and treat in home, appetite is normal, denies any chest pain or general pain, no abd pain or constipation, denies cough or shortness of breath  PCP f/u on 3/9/21 with PCP, cardiology 3/15/21, neurology 4/2/21         Past Medical History:   Diagnosis Date    Anal fissure     Cardiac disorder     Cognitive changes 12/23/2020    Esophageal reflux     Esophagitis, reflux     Hemorrhoids     Hepatic hemangioma     Last Assessed: 1/13/2015    Herpes zoster     History of colonic polyps     Hypertension     [facility]     Lumbar herniated disc     Malignant neoplasm without specification of site ([facility] )     Nephrolithiasis     L  Lithotripsy    Nontoxic single thyroid nodule     Last Assessed: 1/13/2015    Osteoarthritis     Overactive bladder     Raynaud disease     Respiratory system disease     [facility]     Spinal stenosis     PONCHO (stress urinary incontinence, female)     Uterovaginal prolapse     Grade I-II       Past Surgical History:   Procedure Laterality Date    APPENDECTOMY  1947    CARDIAC SURGERY      CABG    CATARACT EXTRACTION Bilateral     COLONOSCOPY  2012    Fiberoptic    COLONOSCOPY      Resolved: 2006 - 2012 5 year f/u    CORONARY ANGIOPLASTY WITH STENT PLACEMENT      CORONARY ARTERY BYPASS GRAFT      Resolved: 2012    ESOPHAGOGASTRODUODENOSCOPY  2012    Diagnostic    HEMORROIDECTOMY      KNEE SURGERY      LITHOTRIPSY      Renal    MALIGNANT SKIN LESION EXCISION      Face; Resolved: 2004    WA ESOPHAGOGASTRODUODENOSCOPY TRANSORAL DIAGNOSTIC N/A 4/13/2016    Procedure: EGD AND COLONOSCOPY;  Surgeon: Jon Lester MD;  [facility];   Service: Gastroenterology    RENAL ARTERY STENT      SKIN LESION EXCISION      Scalp    SOFT TISSUE TUMOR RESECTION      Shoulder; Resolved: 1995    THROMBOLYSIS      Postoperative Thrombolysis PTCA    TONSILLECTOMY      Resolved: 1944       Current Outpatient Medications   Medication Sig Dispense Refill    acetaminophen (TYLENOL) 325 mg tablet Take 2 tablets (650 mg total) by mouth every 6 (six) hours as needed for mild pain  0    amiodarone 200 mg tablet Take 1 tablet (200 mg total) by mouth daily with breakfast 30 tablet 0    atorvastatin (LIPITOR) 20 mg tablet Take 1 tablet (20 mg total) by mouth daily with dinner 30 tablet 0    bumetanide (BUMEX) 2 mg tablet Take 1 tablet (2 mg total) by mouth daily 30 tablet 0    ciprofloxacin (CIPRO) 250 mg tablet Take 1 tablet (250 mg total) by mouth every 12 (twelve) hours for 5 days 10 tablet 0    levETIRAcetam (KEPPRA) 500 mg tablet Take 1 tablet (500 mg total) by mouth every 12 (twelve) hours 60 tablet 0    magnesium oxide (MAG-OX) 400 mg Take 1 tablet (400 mg total) by mouth 2 (two) times a day before lunch and dinner  0    metoprolol succinate (TOPROL-XL) 25 mg 24 hr tablet Take 1 tablet (25 mg total) by mouth daily 30 tablet 0    multivitamin (THERAGRAN) TABS Take 1 tablet by mouth daily   nitroglycerin (NITROSTAT) 0 4 mg SL tablet Place 1 tablet (0 4 mg total) under the tongue every 5 (five) minutes as needed for chest pain 25 tablet 0    pantoprazole (PROTONIX) 40 mg tablet Take 1 tablet (40 mg total) by mouth daily in the early morning 30 tablet 0    potassium chloride (K-DUR,KLOR-CON) 20 mEq tablet Take 2 tablets (40 mEq total) by mouth daily 60 tablet 0    prasugrel (EFFIENT) tablet Take 1 tablet (10 mg total) by mouth daily 30 tablet 0    psyllium (METAMUCIL) packet Take 1 packet by mouth daily  0    rivaroxaban (XARELTO) 15 mg tablet Take 1 tablet (15 mg total) by mouth daily with dinner 30 tablet 0     No current facility-administered medications for this visit  Allergies   Allergen Reactions    Sulfa Antibiotics Anaphylaxis    Formaldehyde     Codeine Palpitations       Review of Systems   Constitutional: Positive for activity change, appetite change (improved) and fatigue (slight)  Negative for chills and fever  HENT: Negative for ear pain and sore throat  Eyes: Negative for pain and visual disturbance  Respiratory: Negative for cough, shortness of breath and wheezing  Cardiovascular: Negative for chest pain, palpitations and leg swelling  Gastrointestinal: Negative for abdominal pain, constipation and vomiting  Genitourinary: Negative for dysuria and hematuria  Musculoskeletal: Positive for gait problem  Negative for arthralgias and back pain  Skin: Negative for color change and rash     Neurological: Positive for weakness  Negative for seizures and syncope  All other systems reviewed and are negative  Video Exam    Vitals:    03/04/21 1352   BP: 160/84   Pulse: 76   Resp: 16   Temp: 97 6 °F (36 4 °C)   Weight: 55 8 kg (123 lb)       Physical Exam  Vitals signs reviewed  Constitutional:       Appearance: Normal appearance  HENT:      Head: Normocephalic and atraumatic  Eyes:      Conjunctiva/sclera: Conjunctivae normal    Neck:      Musculoskeletal: Normal range of motion  Cardiovascular:      Rate and Rhythm: Normal rate and regular rhythm  Heart sounds: Normal heart sounds, S1 normal and S2 normal  No murmur  Pulmonary:      Effort: Pulmonary effort is normal  No respiratory distress  Breath sounds: Normal breath sounds  No wheezing  Abdominal:      General: Bowel sounds are normal  There is no distension  Palpations: Abdomen is soft  Tenderness: There is no abdominal tenderness  Musculoskeletal: Normal range of motion  Right lower leg: No edema  Left lower leg: No edema  Comments: Generalized weakness   Skin:     General: Skin is warm and dry  Neurological:      Mental Status: She is alert  Psychiatric:         Attention and Perception: Attention normal          Mood and Affect: Mood normal          Speech: Speech normal          Behavior: Behavior normal          Thought Content: Thought content normal          Cognition and Memory: Memory is impaired       Ambulation: Unassisted: yes   Walker: yes Wheelchair: no   Nonambulatory: no      Basic ADLs Independent:   Hygiene: yes   Toileting: no    Bathing: yes   Dressing: yes   Eating: no   Transfers: yes     IADLs:   Shopping: yes     Medications: yes      Finances: yes     Meals: yes   Driving: yes    Continence:   Stool: yes    Urine: yes    Advance Directives  Code status: Level 3: DNAR and DNI  Living Will on file: Catrachita Matson spouse health care agent      VIRTUAL VISIT [facility] acknowledges that she has consented to an online visit or consultation  She understands that the online visit is based solely on information provided by her, and that, in the absence of a face-to-face physical evaluation by the physician, the diagnosis she receives is both limited and provisional in terms of accuracy and completeness  This is not intended to replace a full medical face-to-face evaluation by the physician  López Bailey understands and accepts these terms

## 2021-03-05 NOTE — ASSESSMENT & PLAN NOTE
· Pt was evaluated by neurology previous hospital stay  · EEG showing mild diffuse cerebral dysfunction with left temporal focal cerebral dysfunction  · Pt currently taking keppra  · Followed by Neurology 4/2/21

## 2021-03-09 ENCOUNTER — OFFICE VISIT (OUTPATIENT)
Dept: FAMILY MEDICINE CLINIC | Facility: MEDICAL CENTER | Age: 81
End: 2021-03-09
Payer: MEDICARE

## 2021-03-09 VITALS
SYSTOLIC BLOOD PRESSURE: 156 MMHG | OXYGEN SATURATION: 96 % | TEMPERATURE: 98.8 F | WEIGHT: 123 LBS | HEART RATE: 71 BPM | HEIGHT: 58 IN | BODY MASS INDEX: 25.82 KG/M2 | DIASTOLIC BLOOD PRESSURE: 86 MMHG

## 2021-03-09 DIAGNOSIS — I48.0 PAROXYSMAL ATRIAL FIBRILLATION (HCC): ICD-10-CM

## 2021-03-09 DIAGNOSIS — I10 ESSENTIAL HYPERTENSION: ICD-10-CM

## 2021-03-09 DIAGNOSIS — I25.10 CORONARY ARTERY DISEASE INVOLVING NATIVE HEART WITHOUT ANGINA PECTORIS, UNSPECIFIED VESSEL OR LESION TYPE: Primary | ICD-10-CM

## 2021-03-09 DIAGNOSIS — R56.9 SEIZURE-LIKE ACTIVITY (HCC): ICD-10-CM

## 2021-03-09 PROCEDURE — 99495 TRANSJ CARE MGMT MOD F2F 14D: CPT | Performed by: FAMILY MEDICINE

## 2021-03-09 PROCEDURE — 1111F DSCHRG MED/CURRENT MED MERGE: CPT | Performed by: FAMILY MEDICINE

## 2021-03-09 NOTE — PROGRESS NOTES
She when is here for LEON  She was hospitalized at [facility] 2/22/21 to 2/20 3/21  She was hospitalized for chest pain  She has actually had numerous hospitalizations this winter  Admission in January was for NSTEMI and cardiac catheterization  She was diagnosed with tachy-jillian syndrome and pacemaker was implanted on 01/19  She has atrial fibrillation which is rate controlled on amiodarone and metoprolol  She has combined systolic and diastolic congestive heart failure  In addition she was found to have seizure-like activity  Abnormal EEG was shown with left temporal focal dysfunction  She was put on Keppra  Patient states that she is doing well overall except that her energy level is very poor   agrees  She has had no further seizures  Her appetite is good  Weight is stable  She has not had any further chest pain  She sees cardiology next week  The  However she does complain of left shoulder and left arm pain since she had her pacemaker implanted  She denies any previous left shoulder arm pain  There is no paresthesias  No weakness in the arm  It does hurt to lift her arm  She thinks it might be getting a little bit better  She admits to being irritable and  agrees  Motivation and mood are overall okay  Very fatigued as above  O: /86 (BP Location: Left arm, Patient Position: Sitting, Cuff Size: Adult)   Pulse 71   Temp 98 8 °F (37 1 °C)   Ht 4' 10" (1 473 m)   Wt 55 8 kg (123 lb)   SpO2 96%   BMI 25 71 kg/m²    She looks well but frail  Weight is stable  Neck no adenopathy thyromegaly bruits  Chest is clear with good breath sounds  Cardiac regular rate rhythm  Left shoulder  Pacemaker site appears normal   Incision well healed  There is tenderness above and below the pacemaker site with no swelling noted  There is also tenderness over the anterior mid arm  Assessment  1   Multiple cardiac problems as above including combined systolic and diastolic congestive heart failure, atrial fibrillation, and coronary artery disease  She is status post pacemaker for tachy-jillian syndrome  She is due to see Cardiology next week  The poor energy is likely due to her heart failure  2  Seizure disorder-continue Keppra  Follow-up with Neurology  Previously told has been I am unable to sign her 's form  3  Left shoulder pain-pain did start after her pacemaker implantation  She will be following up with cardiology next week regarding this  4  Mild depression-I did suggest an SSRI which she declines at this time  5  Health maintenance-I did advise COVID vaccination    Plan  Follow-up with cardiology  Recheck 3 months

## 2021-03-12 ENCOUNTER — PATIENT OUTREACH (OUTPATIENT)
Dept: FAMILY MEDICINE CLINIC | Facility: CLINIC | Age: 81
End: 2021-03-12

## 2021-03-12 NOTE — PROGRESS NOTES
Pt is receiving home health services for nursing, physical and occupational therapy  Scheduled to see Dr Yocasta Nesbitt on 3/17/21   inquiring if they will return to see Dr Elizabeth Desai, advised to discuss with Dr Ritu Herring visit  Pt reports she is "doing ok"  Continues to complain of pain in left arm and shoulder and is relating it to the "pacemaker"  Having difficulty getting comfortable sleeping in bed and having to prop her arm to find a comfortable position  Ambulating with use of walker  Fatigues easily  Appetite fair  Will continue to follow

## 2021-03-15 ENCOUNTER — APPOINTMENT (OUTPATIENT)
Dept: LAB | Facility: MEDICAL CENTER | Age: 81
End: 2021-03-15
Payer: MEDICARE

## 2021-03-15 ENCOUNTER — TELEPHONE (OUTPATIENT)
Dept: FAMILY MEDICINE CLINIC | Facility: MEDICAL CENTER | Age: 81
End: 2021-03-15

## 2021-03-15 DIAGNOSIS — R39.9 URINARY TRACT INFECTION SYMPTOMS: ICD-10-CM

## 2021-03-15 DIAGNOSIS — F41.8 DEPRESSION WITH ANXIETY: Primary | ICD-10-CM

## 2021-03-15 DIAGNOSIS — R39.9 URINARY TRACT INFECTION SYMPTOMS: Primary | ICD-10-CM

## 2021-03-15 LAB
BACTERIA UR QL AUTO: ABNORMAL /HPF
BILIRUB UR QL STRIP: NEGATIVE
CLARITY UR: CLEAR
COLOR UR: YELLOW
GLUCOSE UR STRIP-MCNC: NEGATIVE MG/DL
HGB UR QL STRIP.AUTO: NEGATIVE
HYALINE CASTS #/AREA URNS LPF: ABNORMAL /LPF
KETONES UR STRIP-MCNC: NEGATIVE MG/DL
LEUKOCYTE ESTERASE UR QL STRIP: ABNORMAL
NITRITE UR QL STRIP: NEGATIVE
NON-SQ EPI CELLS URNS QL MICRO: ABNORMAL /HPF
PH UR STRIP.AUTO: 7 [PH]
PROT UR STRIP-MCNC: NEGATIVE MG/DL
RBC #/AREA URNS AUTO: ABNORMAL /HPF
SP GR UR STRIP.AUTO: 1.01 (ref 1–1.03)
UROBILINOGEN UR QL STRIP.AUTO: 0.2 E.U./DL
WBC #/AREA URNS AUTO: ABNORMAL /HPF

## 2021-03-15 PROCEDURE — 81001 URINALYSIS AUTO W/SCOPE: CPT

## 2021-03-15 PROCEDURE — 87086 URINE CULTURE/COLONY COUNT: CPT

## 2021-03-15 PROCEDURE — 87186 SC STD MICRODIL/AGAR DIL: CPT

## 2021-03-15 PROCEDURE — 87077 CULTURE AEROBIC IDENTIFY: CPT

## 2021-03-15 RX ORDER — SERTRALINE HYDROCHLORIDE 25 MG/1
25 TABLET, FILM COATED ORAL DAILY
Qty: 30 TABLET | Refills: 2 | Status: SHIPPED | OUTPATIENT
Start: 2021-03-15 | End: 2021-06-14

## 2021-03-15 NOTE — TELEPHONE ENCOUNTER
Sending back regarding the part about  treatment for depression   S/w patient - aware to go to the lab to repeat  Ua and culture, orders placed

## 2021-03-17 ENCOUNTER — TELEPHONE (OUTPATIENT)
Dept: FAMILY MEDICINE CLINIC | Facility: MEDICAL CENTER | Age: 81
End: 2021-03-17

## 2021-03-17 ENCOUNTER — OFFICE VISIT (OUTPATIENT)
Dept: CARDIOLOGY CLINIC | Facility: CLINIC | Age: 81
End: 2021-03-17
Payer: MEDICARE

## 2021-03-17 VITALS
WEIGHT: 129 LBS | HEIGHT: 58 IN | DIASTOLIC BLOOD PRESSURE: 80 MMHG | HEART RATE: 82 BPM | SYSTOLIC BLOOD PRESSURE: 132 MMHG | BODY MASS INDEX: 27.08 KG/M2

## 2021-03-17 DIAGNOSIS — I48.0 PAROXYSMAL ATRIAL FIBRILLATION (HCC): Primary | ICD-10-CM

## 2021-03-17 DIAGNOSIS — I27.20 PULMONARY HYPERTENSION (HCC): ICD-10-CM

## 2021-03-17 DIAGNOSIS — I49.5 SICK SINUS SYNDROME (HCC): ICD-10-CM

## 2021-03-17 DIAGNOSIS — Z79.01 CURRENT USE OF LONG TERM ANTICOAGULATION: ICD-10-CM

## 2021-03-17 DIAGNOSIS — R39.9 URINARY TRACT INFECTION SYMPTOMS: Primary | ICD-10-CM

## 2021-03-17 DIAGNOSIS — I10 ESSENTIAL HYPERTENSION: Chronic | ICD-10-CM

## 2021-03-17 DIAGNOSIS — I50.43 ACUTE ON CHRONIC COMBINED SYSTOLIC AND DIASTOLIC CONGESTIVE HEART FAILURE (HCC): ICD-10-CM

## 2021-03-17 DIAGNOSIS — E78.5 HYPERLIPIDEMIA, UNSPECIFIED HYPERLIPIDEMIA TYPE: ICD-10-CM

## 2021-03-17 DIAGNOSIS — I70.1 RENAL ARTERY STENOSIS (HCC): ICD-10-CM

## 2021-03-17 DIAGNOSIS — I49.5 TACHY-BRADY SYNDROME (HCC): ICD-10-CM

## 2021-03-17 DIAGNOSIS — Z95.1 HX OF CABG: ICD-10-CM

## 2021-03-17 DIAGNOSIS — Z79.899 LONG TERM CURRENT USE OF AMIODARONE: ICD-10-CM

## 2021-03-17 DIAGNOSIS — R56.9 SEIZURE-LIKE ACTIVITY (HCC): ICD-10-CM

## 2021-03-17 DIAGNOSIS — I25.810 CORONARY ARTERY DISEASE INVOLVING OTHER CORONARY ARTERY BYPASS GRAFT WITHOUT ANGINA PECTORIS: ICD-10-CM

## 2021-03-17 LAB — BACTERIA UR CULT: ABNORMAL

## 2021-03-17 PROCEDURE — 99024 POSTOP FOLLOW-UP VISIT: CPT | Performed by: INTERNAL MEDICINE

## 2021-03-17 PROCEDURE — 93000 ELECTROCARDIOGRAM COMPLETE: CPT | Performed by: INTERNAL MEDICINE

## 2021-03-17 RX ORDER — CEFUROXIME AXETIL 250 MG/1
250 TABLET ORAL EVERY 12 HOURS SCHEDULED
Qty: 14 TABLET | Refills: 0 | Status: SHIPPED | OUTPATIENT
Start: 2021-03-17 | End: 2021-03-24

## 2021-03-17 NOTE — LETTER
March 21, 2021     Wicho Bass MD  [address] [facility] [address]    Patient: Jayson Maxwell   YOB: 1940   Date of Visit: 3/17/2021       Dear Dr Ayad Calix: Thank you for referring Teja Ortiz to me for evaluation  Below are my notes for this consultation  If you have questions, please do not hesitate to call me  I look forward to following your patient along with you  Sincerely,        Delvin Naranjo MD        CC: MD Delvin Lisa MD  3/21/2021 10:01 AM  Sign when Signing Visit                                             Cardiology Follow Up    Jayson Maxwell  1940  [id number]  [address]  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[address] )     5  Seizure-like activity ([address] )     6  Hyperlipidemia, unspecified hyperlipidemia type     7  Long term current use of amiodarone     8  Current use of long term anticoagulation     9  Renal artery stenosis ([address] )     10  Pulmonary hypertension ([address] )     11  Sick sinus syndrome ([address] )     12  Hx of CABG     13   Coronary artery disease involving other coronary artery bypass graft without angina pectoris         Interval History:   The patient is currently here for follow-up post hospital visit and device implantation  At the current time see feels well        The patient has significant medical illnesses which include  Paroxysmal atrial fibrillation, symptomatic  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LBBB at baseline  LVEF 45% by echo,  History of CAD, prior CABG  Moderate pulmonary hypertension  Hyperlipidemia  Bilateral renal artery stenosis  Suspected Sjogren's      Patient is not complaining of anginal like chest pain or pressure   No worsening orthopnea or PND   No worsening leg swelling    No significant palpitations   No presyncope or syncope  No orthostatic lightheadedness    Some improvement in exertional tolerance    Patient is a never smoker  No history of alcohol abuse   No history of drug abuse   No history of excessive caffeinated beverages    Patient is not complaining of snoring, morning fatigue or daytime sleepiness    Patient does have a history of pulmonary hypertension and bilateral renal artery stenosis   She also has a history of coronary artery disease and is post bypass    Patient has a family history of hypertension, coronary artery disease, ulcerative colitis    /80   Pulse 82   Ht 4' 10" (1 473 m)   Wt 58 5 kg (129 lb)   BMI 26 96 kg/m²           Patient Active Problem List   Diagnosis    Essential hypertension    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[facility]    A-fib ([address] )    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[address] )    PAD (peripheral artery disease) (HCC)    Abnormal liver enzymes    Edema of left upper extremity    Urinary retention    Seizure-like activity ([address] )    Hyperlipidemia    Toxic metabolic encephalopathy    UTI (urinary tract infection)    Depression    Current use of long term anticoagulation    Long term current use of amiodarone    Renal artery stenosis ([address] )    Pulmonary hypertension ([address] )    Sick sinus syndrome ([address] )    Hx of CABG    CAD (coronary artery disease)     Past Medical History:   Diagnosis Date    Anal fissure     Cardiac disorder     Cognitive changes 12/23/2020    Esophageal reflux     Esophagitis, reflux     Hemorrhoids     Hepatic hemangioma     Last Assessed: 1/13/2015    Herpes zoster     History of colonic polyps     Hypertension     Ischemic colitis ([address] )     Lumbar herniated disc     Malignant neoplasm without specification of site ([address] )     Nephrolithiasis     L   Lithotripsy    Nontoxic single thyroid nodule     Last Assessed: 1/13/2015    Osteoarthritis     Overactive bladder     Raynaud disease     Respiratory system disease     Sjogren's disease ([address] )     Spinal stenosis     PONCHO (stress urinary incontinence, female)     Uterovaginal prolapse     Grade I-II     Social History     Socioeconomic History    Marital status: /Civil Union     Spouse name: Not on file    Number of children: Not on file    Years of education: Not on file    Highest education level: Not on file   Occupational History    Occupation: retired   Social Needs    Financial resource strain: Not on file    Food insecurity     Worry: Not on file     Inability: Not on file   [employer] needs     Medical: Not on file     Non-medical: Not on file   Tobacco Use    Smoking status: Never Smoker    Smokeless tobacco: Never Used   Substance and Sexual Activity    Alcohol use: Not Currently     Frequency: Monthly or less     Comment: social    Drug use: No    Sexual activity: Not Currently   Lifestyle    Physical activity     Days per week: Not on file     Minutes per session: Not on file    Stress: Not on file   Relationships    Social connections     Talks on phone: Not on file     Gets together: Not on file     Attends Scientologist service: Not on file     Active member of club or organization: Not on file     Attends meetings of clubs or organizations: Not on file     Relationship status: Not on file    Intimate partner violence     Fear of current or ex partner: Not on file     Emotionally abused: Not on file     Physically abused: Not on file     Forced sexual activity: Not on file   Other Topics Concern    Not on file   Social History Narrative    Activities: golf    Caffeine use    Consumes healthy diet    Daily caffeinated coffee consumption: 1 cup per day    Drinks caffeinated tea: 1 cup per day    Well balanced diet      Family History   Problem Relation Age of Onset    Heart disease Mother     Hypertension Mother     Osteoporosis Mother    [facility] Heart disease Father     Hypertension Father     Ulcerative colitis Family     Colon polyps Family      Past Surgical History:   Procedure Laterality Date    APPENDECTOMY  1947    CARDIAC SURGERY      CABG    CATARACT EXTRACTION Bilateral     COLONOSCOPY  2012    Fiberoptic    COLONOSCOPY      Resolved: 2006 - 2012 5 year f/u    CORONARY ANGIOPLASTY WITH STENT PLACEMENT      CORONARY ARTERY BYPASS GRAFT      Resolved: 2012    ESOPHAGOGASTRODUODENOSCOPY  2012    Diagnostic    HEMORROIDECTOMY      KNEE SURGERY      LITHOTRIPSY      Renal    MALIGNANT SKIN LESION EXCISION      Face; Resolved: 2004    MS ESOPHAGOGASTRODUODENOSCOPY TRANSORAL DIAGNOSTIC N/A 4/13/2016    Procedure: EGD AND COLONOSCOPY;  Surgeon: Chandana Gill MD;  [facility];   Service: Gastroenterology    RENAL ARTERY STENT      SKIN LESION EXCISION      Scalp    SOFT TISSUE TUMOR RESECTION      Shoulder; Resolved: 1995    THROMBOLYSIS      Postoperative Thrombolysis PTCA    TONSILLECTOMY      Resolved: 1944       Current Outpatient Medications:     acetaminophen (TYLENOL) 325 mg tablet, Take 2 tablets (650 mg total) by mouth every 6 (six) hours as needed for mild pain, Disp: , Rfl: 0    amiodarone 200 mg tablet, Take 1 tablet (200 mg total) by mouth daily with breakfast, Disp: 30 tablet, Rfl: 0    atorvastatin (LIPITOR) 20 mg tablet, Take 1 tablet (20 mg total) by mouth daily with dinner, Disp: 30 tablet, Rfl: 0    bumetanide (BUMEX) 2 mg tablet, Take 1 tablet (2 mg total) by mouth daily, Disp: 30 tablet, Rfl: 0    cefuroxime (CEFTIN) 250 mg tablet, Take 1 tablet (250 mg total) by mouth every 12 (twelve) hours for 7 days, Disp: 14 tablet, Rfl: 0    levETIRAcetam (KEPPRA) 500 mg tablet, Take 1 tablet (500 mg total) by mouth every 12 (twelve) hours, Disp: 60 tablet, Rfl: 0    magnesium oxide (MAG-OX) 400 mg, Take 1 tablet (400 mg total) by mouth 2 (two) times a day before lunch and dinner, Disp: , Rfl: 0    metoprolol succinate (TOPROL-XL) 25 mg 24 hr tablet, Take 1 tablet (25 mg total) by mouth daily, Disp: 30 tablet, Rfl: 0    multivitamin (THERAGRAN) TABS, Take 1 tablet by mouth daily  , Disp: , Rfl:     nitroglycerin (NITROSTAT) 0 4 mg SL tablet, Place 1 tablet (0 4 mg total) under the tongue every 5 (five) minutes as needed for chest pain, Disp: 25 tablet, Rfl: 0    pantoprazole (PROTONIX) 40 mg tablet, Take 1 tablet (40 mg total) by mouth daily in the early morning, Disp: 30 tablet, Rfl: 0    potassium chloride (K-DUR,KLOR-CON) 20 mEq tablet, Take 2 tablets (40 mEq total) by mouth daily, Disp: 60 tablet, Rfl: 0    prasugrel (EFFIENT) tablet, Take 1 tablet (10 mg total) by mouth daily, Disp: 30 tablet, Rfl: 0    psyllium (METAMUCIL) packet, Take 1 packet by mouth daily, Disp: , Rfl: 0    rivaroxaban (XARELTO) 15 mg tablet, Take 1 tablet (15 mg total) by mouth daily with dinner, Disp: 30 tablet, Rfl: 0    sertraline (ZOLOFT) 25 mg tablet, Take 1 tablet (25 mg total) by mouth daily, Disp: 30 tablet, Rfl: 2  Allergies   Allergen Reactions    Sulfa Antibiotics Anaphylaxis    Formaldehyde     Codeine Palpitations       Labs:  Lab Results   Component Value Date     12/24/2015    K 4 0 02/24/2021    K 3 6 12/24/2015     02/24/2021     12/24/2015    CO2 31 02/24/2021    CO2 33 (H) 01/29/2021    BUN 15 02/24/2021    BUN 9 12/24/2015    CREATININE 1 22 02/24/2021    CREATININE 0 77 12/24/2015    GLUCOSE 106 01/29/2021    GLUCOSE 97 12/24/2015    CALCIUM 8 9 02/24/2021    CALCIUM 8 6 12/24/2015     Lab Results   Component Value Date    TROPONINI 0 03 02/24/2021     Lab Results   Component Value Date    WBC 6 18 02/24/2021    WBC 4 91 12/24/2015    HGB 11 9 02/24/2021    HGB 12 1 12/24/2015    HCT 36 7 02/24/2021    HCT 37 7 12/24/2015    MCV 95 02/24/2021    MCV 90 12/24/2015     02/24/2021     12/24/2015     Lab Results   Component Value Date    CHOL 231 07/14/2015    TRIG 88 12/04/2020 TRIG 51 07/14/2015    HDL 62 12/04/2020    HDL 79 07/14/2015    LDLDIRECT 116 (H) 09/22/2020     Imaging:     Xr Chest 2 Views  Result Date: 2/24/2021  Narrative: CHEST INDICATION:   chest pain  COMPARISON:  Chest radiograph from 2/22/2021  Abdomen CT from 1/26/2021  Chest CT from 1/7/2021  EXAM PERFORMED/VIEWS:  XR CHEST PA & LATERAL  DUAL ENERGY SUBTRACTION  FINDINGS: Cardiomediastinal silhouette normal  CABG  Coronary stents  Left subclavian pacemaker leads in the right atrial appendage and right ventricular apex  Lungs clear  Trace effusions  No pneumothorax  Osseous structures normal for age  Impression: Trace effusions  Workstation performed: [id number]         Echocardiogram   January 2021  LEFT VENTRICLE:  Size was normal   Systolic function was at the lower limits of normal  Ejection fraction was estimated to be 50 %  There were no regional wall motion abnormalities  There was severe hypokinesis of the distal septum  Wall thickness was mildly increased  Features were consistent with a pseudonormal left ventricular filling pattern, with concomitant abnormal relaxation and increased filling pressure (grade 2 diastolic dysfunction)      RIGHT VENTRICLE:  The size was normal   Systolic function was normal      LEFT ATRIUM:  The atrium was mildly dilated      MITRAL VALVE:  There was mild to moderate regurgitation      TRICUSPID VALVE:  There was mild regurgitation  Pulmonary artery systolic pressure was markedly increased  The findings suggest severe pulmonary hypertension      IVC, HEPATIC VEINS:  The inferior vena cava was mildly dilated  Respirophasic changes were blunted (less than 50% variation)              Review of Systems:  Review of Systems   All other systems reviewed and are negative  as described in my history of present illness        Physical Exam:  Physical Exam  Vitals signs reviewed  Constitutional:       Appearance: Normal appearance  She is well-developed   She is not ill-appearing  Comments: Not in any distress at the current time   HENT:      Head: Normocephalic and atraumatic  Right Ear: External ear normal       Left Ear: External ear normal       Nose: Nose normal       Mouth/Throat:      Pharynx: Uvula midline  Eyes:      General: Lids are normal  No scleral icterus  Conjunctiva/sclera: Conjunctivae normal       Comments: No pallor  No cyanosis  No icterus   Neck:      Musculoskeletal: Normal range of motion and neck supple  No neck rigidity or muscular tenderness  Thyroid: No thyromegaly  Vascular: No carotid bruit or JVD  Trachea: Trachea normal       Comments: No jugular lymphadenopathy  Cardiovascular:      Rate and Rhythm: Normal rate and regular rhythm  Chest Wall: PMI is not displaced  Pulses: Normal pulses  Heart sounds: Normal heart sounds, S1 normal and S2 normal  No murmur  No friction rub  No gallop  No S3 or S4 sounds  Pulmonary:      Effort: Pulmonary effort is normal  No accessory muscle usage or respiratory distress  Breath sounds: Normal breath sounds  No decreased breath sounds, wheezing, rhonchi or rales  Chest:      Chest wall: No tenderness  Abdominal:      General: Abdomen is flat  Bowel sounds are normal  There is no distension  Palpations: Abdomen is soft  There is no hepatomegaly, splenomegaly or mass  Tenderness: There is no abdominal tenderness  Musculoskeletal: Normal range of motion  General: No swelling, tenderness or deformity  Lymphadenopathy:      Cervical: No cervical adenopathy  Skin:     Coloration: Skin is not jaundiced or pale  Findings: No abrasion, bruising, erythema, lesion or rash  Nails: There is no clubbing  Neurological:      Mental Status: She is alert and oriented to person, place, and time  Mental status is at baseline  Cranial Nerves: No cranial nerve deficit        Comments: Facial symmetry is retained  Extraocular movements are retained  Head neck tongue and palate movement are retained and symmetric   Psychiatric:         Mood and Affect: Mood normal          Speech: Speech normal          Behavior: Behavior normal          Thought Content: Thought content normal          Discussion/Summary:    1  New onset AFib with RVR, paroxysmal  Anticoagulation-chads Vasc post 6-age, gender, CAD, CHF, hypertension-on rivaroxaban  Rate control-metoprolol after device has been placed   Rhythm control - on amiodarone currently, need to follow for toxicity  No history of prior cardioversion or ablation      2  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3  Left bundle branch block at baseline  Patient is post permanent pacemaker  His lead in the septum-RBBB in pacing      4  Moderate pulmonary hypertension   Avoid volume overload and control rate  Continue with bumetanide      5  Essential hypertension  Blood pressure today is 132/80   Patient is currently on metoprolol, bumetanide, amlodipine      6  Heart failure with preserved ejection fraction  Secondary to of RVR   Diuresis and rate control       7  History of CAD post CABG  - prior CABG (LIMA to LAD, SVG to D1, SVG to OM1, SVG to RCA) 2011 / revascularization of LAD in diagonal branch with PCI in late 2011  - now status post LAMINE to proximal LAD and ostial left main on 1/11/2021  - maintained on aspirin, statin, beta blocker, and Effient / also on Ranexa and Imdur      8  Hyperlipidemia  On atorvastatin  Tolerating it without any myositis or myalgia      9  Bilateral renal artery stenosis  Needs to avoid Ace inhibitors or ARB      10  Diarrhea  11  Hypokalemia  Has a history of same  Hypokalemia precipitated the arrhythmia   Routine potassium supplementation with BMP monitoring      12  Episode of seizure   Need to follow up with Neurology to decide on antiepileptic medication use      13  Long-term anticoagulation  PAF   Chads Vasc score 6   Continue with rivaroxaban      14   Amiodarone use  Started in the hospital for AFib with RVR and diastolic heart failure  Follow-up with TSH, CMP, PFT with DLCO, eye evaluation          Summary of my recommendation for this patient   Currently continue with anticoagulation  Follow-up with long-term toxicity for amiodarone-check TSH, CMP, PFT with DLCO, eye evaluation  For antiepileptic medication used follow-up with Neurology

## 2021-03-17 NOTE — PROGRESS NOTES
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[address] )     5  Seizure-like activity ([address] )     6  Hyperlipidemia, unspecified hyperlipidemia type     7  Long term current use of amiodarone     8  Current use of long term anticoagulation     9  Renal artery stenosis ([address] )     10  Pulmonary hypertension ([address] )     11  Sick sinus syndrome ([address] )     12  Hx of CABG     13   Coronary artery disease involving other coronary artery bypass graft without angina pectoris         Interval History:   The patient is currently here for follow-up post hospital visit and device implantation  At the current time see feels well        The patient has significant medical illnesses which include  Paroxysmal atrial fibrillation, symptomatic  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LBBB at baseline  LVEF 45% by echo,  History of CAD, prior CABG  Moderate pulmonary hypertension  Hyperlipidemia  Bilateral renal artery stenosis  Suspected Sjogren's      Patient is not complaining of anginal like chest pain or pressure   No worsening orthopnea or PND   No worsening leg swelling    No significant palpitations   No presyncope or syncope  No orthostatic lightheadedness    Some improvement in exertional tolerance    Patient is a never smoker  No history of alcohol abuse   No history of drug abuse   No history of excessive caffeinated beverages    Patient is not complaining of snoring, morning fatigue or daytime sleepiness    Patient does have a history of pulmonary hypertension and bilateral renal artery stenosis   She also has a history of coronary artery disease and is post bypass    Patient has a family history of hypertension, coronary artery disease, ulcerative colitis    /80   Pulse 82   Ht 4' 10" (1 473 m)   Wt 58 5 kg (129 lb)   BMI 26 96 kg/m²           Patient Active Problem List   Diagnosis    Essential hypertension    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    A-fib ([facility]    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[facility] )    P[facility]peripheral [facility] disease) [facility]    Abnormal liver enzymes    Edema of left upper extremity    Urinary retention    Seizure-like activity (HCC)    Hyperlipidemia    Toxic metabolic encephalopathy    UTI (urinary tract infection)    Depression    Current use of long term anticoagulation    Long term current use of amiodarone    Renal artery stenosis (HCC)    Pulmonary hypertension ([facility]    Sick sinus syndrome ([facility]    Hx of CABG    CAD (coronary artery disease)     Past Medical History:   Diagnosis Date    Anal fissure     Cardiac disorder     Cognitive changes 12/23/2020    Esophageal reflux     Esophagitis, reflux     Hemorrhoids     Hepatic hemangioma     Last Assessed: 1/13/2015    Herpes zoster     History of colonic polyps     Hypertension     Ischemic colitis [facility]     Lumbar herniated disc     Malignant neoplasm without specification of site ([facility] )     Nephrolithiasis     L   Lithotripsy    Nontoxic single thyroid nodule     Last Assessed: 1/13/2015    Osteoarthritis     Overactive bladder     Raynaud disease     Respiratory system disease     Sjogren's disease ([facility]     Spinal stenosis     PONCHO (stress urinary incontinence, female)     Uterovaginal prolapse     Grade I-II     Social History     Socioeconomic History    Marital status: /Civil Union     Spouse name: Not on file    Number of children: Not on file    Years of education: Not on file    Highest education level: Not on file   Occupational History    Occupation: retired   Social Needs    Financial resource strain: Not on file   Laura-Barrett insecurity     Worry: Not on file     Inability: Not on file    Transportation needs     Medical: Not on file     Non-medical: Not on file   Tobacco Use    Smoking status: Never Smoker    Smokeless tobacco: Never Used   Substance and Sexual Activity    Alcohol use: Not Currently     Frequency: Monthly or less     Comment: social    Drug use: No    Sexual activity: Not Currently   Lifestyle    Physical activity     Days per week: Not on file     Minutes per session: Not on file    Stress: Not on file   Relationships    Social connections     Talks on phone: Not on file     Gets together: Not on file     Attends Cheondo[employer] service: Not on file     Active member of club or organization: Not on file     Attends meetings of clubs or organizations: Not on file     Relationship status: Not on file    Intimate partner violence     Fear of current or ex partner: Not on file     Emotionally abused: Not on file     Physically abused: Not on file     Forced sexual activity: Not on file   Other Topics Concern    Not on file   Social History Narrative    Activities: golf    Caffeine use    Consumes healthy diet    Daily caffeinated coffee consumption: 1 cup per day    Drinks caffeinated tea: 1 cup per day    Well balanced diet      Family History   Problem Relation Age of Onset    Heart disease Mother     Hypertension Mother     Osteoporosis Mother     Heart disease Father     Hypertension Father     Ulcerative colitis Family     Colon polyps Family      Past Surgical History:   Procedure Laterality Date    APPENDECTOMY  1947    CARDIAC SURGERY      CABG    CATARACT EXTRACTION Bilateral     COLONOSCOPY  2012    Fiberoptic    COLONOSCOPY      Resolved: 2006 - 2012 5 year f/u    CORONARY ANGIOPLASTY WITH STENT PLACEMENT      CORONARY ARTERY BYPASS GRAFT      Resolved: 2012    ESOPHAGOGASTRODUODENOSCOPY  2012    Diagnostic    HEMORROIDECTOMY      KNEE SURGERY      LITHOTRIPSY      Renal    MALIGNANT SKIN LESION EXCISION      Face; Resolved: 2004    MT ESOPHAGOGASTRODUODENOSCOPY TRANSORAL DIAGNOSTIC N/A 4/13/2016    Procedure: EGD AND COLONOSCOPY;  Surgeon: Karla Mclean MD;  [facility]; Service: Gastroenterology    RENAL ARTERY STENT      SKIN LESION EXCISION      Scalp    SOFT TISSUE TUMOR RESECTION      Shoulder; Resolved: 1995    THROMBOLYSIS      Postoperative Thrombolysis PTCA    TONSILLECTOMY      Resolved: 1944       Current Outpatient Medications:     acetaminophen (TYLENOL) 325 mg tablet, Take 2 tablets (650 mg total) by mouth every 6 (six) hours as needed for mild pain, Disp: , Rfl: 0    amiodarone 200 mg tablet, Take 1 tablet (200 mg total) by mouth daily with breakfast, Disp: 30 tablet, Rfl: 0    atorvastatin (LIPITOR) 20 mg tablet, Take 1 tablet (20 mg total) by mouth daily with dinner, Disp: 30 tablet, Rfl: 0    bumetanide (BUMEX) 2 mg tablet, Take 1 tablet (2 mg total) by mouth daily, Disp: 30 tablet, Rfl: 0    cefuroxime (CEFTIN) 250 mg tablet, Take 1 tablet (250 mg total) by mouth every 12 (twelve) hours for 7 days, Disp: 14 tablet, Rfl: 0    levETIRAcetam (KEPPRA) 500 mg tablet, Take 1 tablet (500 mg total) by mouth every 12 (twelve) hours, Disp: 60 tablet, Rfl: 0    magnesium oxide (MAG-OX) 400 mg, Take 1 tablet (400 mg total) by mouth 2 (two) times a day before lunch and dinner, Disp: , Rfl: 0    metoprolol succinate (TOPROL-XL) 25 mg 24 hr tablet, Take 1 tablet (25 mg total) by mouth daily, Disp: 30 tablet, Rfl: 0    multivitamin (THERAGRAN) TABS, Take 1 tablet by mouth daily  , Disp: , Rfl:     nitroglycerin (NITROSTAT) 0 4 mg SL tablet, Place 1 tablet (0 4 mg total) under the tongue every 5 (five) minutes as needed for chest pain, Disp: 25 tablet, Rfl: 0    pantoprazole (PROTONIX) 40 mg tablet, Take 1 tablet (40 mg total) by mouth daily in the early morning, Disp: 30 tablet, Rfl: 0    potassium chloride (K-DUR,KLOR-CON) 20 mEq tablet, Take 2 tablets (40 mEq total) by mouth daily, Disp: 60 tablet, Rfl: 0    prasugrel (EFFIENT) tablet, Take 1 tablet (10 mg total) by mouth daily, Disp: 30 tablet, Rfl: 0    psyllium (METAMUCIL) packet, Take 1 packet by mouth daily, Disp: , Rfl: 0    rivaroxaban (XARELTO) 15 mg tablet, Take 1 tablet (15 mg total) by mouth daily with dinner, Disp: 30 tablet, Rfl: 0    sertraline (ZOLOFT) 25 mg tablet, Take 1 tablet (25 mg total) by mouth daily, Disp: 30 tablet, Rfl: 2  Allergies   Allergen Reactions    Sulfa Antibiotics Anaphylaxis    Formaldehyde     Codeine Palpitations       Labs:  Lab Results   Component Value Date     12/24/2015    K 4 0 02/24/2021    K 3 6 12/24/2015     02/24/2021     12/24/2015    CO2 31 02/24/2021    CO2 33 (H) 01/29/2021    BUN 15 02/24/2021    BUN 9 12/24/2015    CREATININE 1 22 02/24/2021    CREATININE 0 77 12/24/2015    GLUCOSE 106 01/29/2021    GLUCOSE 97 12/24/2015    CALCIUM 8 9 02/24/2021    CALCIUM 8 6 12/24/2015     Lab Results   Component Value Date    TROPONINI 0 03 02/24/2021     Lab Results   Component Value Date    WBC 6 18 02/24/2021    WBC 4 91 12/24/2015    HGB 11 9 02/24/2021    HGB 12 1 12/24/2015    HCT 36 7 02/24/2021    HCT 37 7 12/24/2015    MCV 95 02/24/2021    MCV 90 12/24/2015     02/24/2021     12/24/2015     Lab Results   Component Value Date    CHOL 231 07/14/2015    TRIG 88 12/04/2020    TRIG 51 07/14/2015    HDL 62 12/04/2020    HDL 79 07/14/2015    LDLDIRECT 116 (H) 09/22/2020     Imaging:     Xr Chest 2 Views  Result Date: 2/24/2021  Narrative: CHEST INDICATION:   chest pain  COMPARISON:  Chest radiograph from 2/22/2021  Abdomen CT from 1/26/2021  Chest CT from 1/7/2021  EXAM PERFORMED/VIEWS:  XR CHEST PA & LATERAL  DUAL ENERGY SUBTRACTION  FINDINGS: Cardiomediastinal silhouette normal  CABG  Coronary stents  Left subclavian pacemaker leads in the right atrial appendage and right ventricular apex  Lungs clear  Trace effusions  No pneumothorax  Osseous structures normal for age  Impression: Trace effusions  Workstation performed: [id number]         Echocardiogram   January 2021  LEFT VENTRICLE:  Size was normal   Systolic function was at the lower limits of normal  Ejection fraction was estimated to be 50 %  There were no regional wall motion abnormalities  There was severe hypokinesis of the distal septum  Wall thickness was mildly increased  Features were consistent with a pseudonormal left ventricular filling pattern, with concomitant abnormal relaxation and increased filling pressure (grade 2 diastolic dysfunction)      RIGHT VENTRICLE:  The size was normal   Systolic function was normal      LEFT ATRIUM:  The atrium was mildly dilated      MITRAL VALVE:  There was mild to moderate regurgitation      TRICUSPID VALVE:  There was mild regurgitation  Pulmonary artery systolic pressure was markedly increased  The findings suggest severe pulmonary hypertension      IVC, HEPATIC VEINS:  The inferior vena cava was mildly dilated  Respirophasic changes were blunted (less than 50% variation)              Review of Systems:  Review of Systems   All other systems reviewed and are negative  as described in my history of present illness        Physical Exam:  Physical Exam  Vitals signs reviewed  Constitutional:       Appearance: Normal appearance  She is well-developed  She is not ill-appearing  Comments: Not in any distress at the current time   HENT:      Head: Normocephalic and atraumatic  Right Ear: External ear normal       Left Ear: External ear normal       Nose: Nose normal       Mouth/Throat:      Pharynx: Uvula midline  Eyes:      General: Lids are normal  No scleral icterus  Conjunctiva/sclera: Conjunctivae normal       Comments: No pallor  No cyanosis  No icterus   Neck:      Musculoskeletal: Normal range of motion and neck supple  No neck rigidity or muscular tenderness  Thyroid: No thyromegaly        Vascular: No carotid bruit or JVD  Trachea: Trachea normal       Comments: No jugular lymphadenopathy  Cardiovascular:      Rate and Rhythm: Normal rate and regular rhythm  Chest Wall: PMI is not displaced  Pulses: Normal pulses  Heart sounds: Normal heart sounds, S1 normal and S2 normal  No murmur  No friction rub  No gallop  No S3 or S4 sounds  Pulmonary:      Effort: Pulmonary effort is normal  No accessory muscle usage or respiratory distress  Breath sounds: Normal breath sounds  No decreased breath sounds, wheezing, rhonchi or rales  Chest:      Chest wall: No tenderness  Abdominal:      General: Abdomen is flat  Bowel sounds are normal  There is no distension  Palpations: Abdomen is soft  There is no hepatomegaly, splenomegaly or mass  Tenderness: There is no abdominal tenderness  Musculoskeletal: Normal range of motion  General: No swelling, tenderness or deformity  Lymphadenopathy:      Cervical: No cervical adenopathy  Skin:     Coloration: Skin is not jaundiced or pale  Findings: No abrasion, bruising, erythema, lesion or rash  Nails: There is no clubbing  Neurological:      Mental Status: She is alert and oriented to person, place, and time  Mental status is at baseline  Cranial Nerves: No cranial nerve deficit  Comments: Facial symmetry is retained  Extraocular movements are retained  Head neck tongue and palate movement are retained and symmetric   Psychiatric:         Mood and Affect: Mood normal          Speech: Speech normal          Behavior: Behavior normal          Thought Content: Thought content normal          Discussion/Summary:    1   New onset AFib with RVR, paroxysmal  Anticoagulation-chads Vasc post 6-age, gender, CAD, CHF, hypertension-on rivaroxaban  Rate control-metoprolol after device has been placed   Rhythm control - on amiodarone currently, need to follow for toxicity  No history of prior cardioversion or ablation      2  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3  Left bundle branch block at baseline  Patient is post permanent pacemaker  His lead in the septum-RBBB in pacing      4  Moderate pulmonary hypertension   Avoid volume overload and control rate  Continue with bumetanide      5  Essential hypertension  Blood pressure today is 132/80   Patient is currently on metoprolol, bumetanide, amlodipine      6  Heart failure with preserved ejection fraction  Secondary to of RVR   Diuresis and rate control       7  History of CAD post CABG  - prior CABG (LIMA to LAD, SVG to D1, SVG to OM1, SVG to RCA) 2011 / revascularization of LAD in diagonal branch with PCI in late 2011  - now status post LAMINE to proximal LAD and ostial left main on 1/11/2021  - maintained on aspirin, statin, beta blocker, and Effient / also on Ranexa and Imdur      8  Hyperlipidemia  On atorvastatin  Tolerating it without any myositis or myalgia      9  Bilateral renal artery stenosis  Needs to avoid Ace inhibitors or ARB      10  Diarrhea  11  Hypokalemia  Has a history of same  Hypokalemia precipitated the arrhythmia   Routine potassium supplementation with BMP monitoring      12  Episode of seizure   Need to follow up with Neurology to decide on antiepileptic medication use      13  Long-term anticoagulation  PAF   Chads Vasc score 6   Continue with rivaroxaban      14   Amiodarone use  Started in the hospital for AFib with RVR and diastolic heart failure  Follow-up with TSH, CMP, PFT with DLCO, eye evaluation          Summary of my recommendation for this patient   Currently continue with anticoagulation  Follow-up with long-term toxicity for amiodarone-check TSH, CMP, PFT with DLCO, eye evaluation  For antiepileptic medication used follow-up with Neurology

## 2021-03-17 NOTE — TELEPHONE ENCOUNTER
Urine culture is positive for different bacteria  Only sensitive to a few antibiotics  I am going to send a prescription for cefuroxime  Should take for 7 days

## 2021-03-18 ENCOUNTER — NURSING HOME VISIT (OUTPATIENT)
Dept: GERIATRICS | Facility: OTHER | Age: 81
End: 2021-03-18
Payer: MEDICARE

## 2021-03-18 DIAGNOSIS — R56.9 SEIZURE-LIKE ACTIVITY (HCC): ICD-10-CM

## 2021-03-18 DIAGNOSIS — I25.10 CORONARY ARTERY DISEASE INVOLVING NATIVE CORONARY ARTERY OF NATIVE HEART WITHOUT ANGINA PECTORIS: ICD-10-CM

## 2021-03-18 DIAGNOSIS — I48.0 PAROXYSMAL ATRIAL FIBRILLATION (HCC): Primary | ICD-10-CM

## 2021-03-18 DIAGNOSIS — I50.42 CHRONIC COMBINED SYSTOLIC AND DIASTOLIC CONGESTIVE HEART FAILURE (HCC): ICD-10-CM

## 2021-03-18 DIAGNOSIS — I10 ESSENTIAL HYPERTENSION: Chronic | ICD-10-CM

## 2021-03-18 DIAGNOSIS — F32.A DEPRESSION, UNSPECIFIED DEPRESSION TYPE: ICD-10-CM

## 2021-03-18 DIAGNOSIS — N39.0 URINARY TRACT INFECTION WITHOUT HEMATURIA, SITE UNSPECIFIED: ICD-10-CM

## 2021-03-18 PROCEDURE — 99349 HOME/RES VST EST MOD MDM 40: CPT | Performed by: NURSE PRACTITIONER

## 2021-03-19 PROBLEM — R07.9 CHEST PAIN: Status: RESOLVED | Noted: 2021-02-22 | Resolved: 2021-03-19

## 2021-03-19 PROBLEM — R30.0 DYSURIA: Status: RESOLVED | Noted: 2021-02-19 | Resolved: 2021-03-19

## 2021-03-19 PROBLEM — R19.7 DIARRHEA: Status: RESOLVED | Noted: 2021-01-25 | Resolved: 2021-03-19

## 2021-03-19 PROBLEM — F32.A DEPRESSION: Status: ACTIVE | Noted: 2021-03-19

## 2021-03-19 PROBLEM — I25.10 CAD (CORONARY ARTERY DISEASE): Status: RESOLVED | Noted: 2021-01-21 | Resolved: 2021-03-19

## 2021-03-19 PROBLEM — J69.0 ASPIRATION PNEUMONIA (HCC): Status: RESOLVED | Noted: 2021-02-01 | Resolved: 2021-03-19

## 2021-03-19 NOTE — PROGRESS NOTES
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)     · Asymptomatic  · Continue to monitor weights  · Continue with low Na diet  · Continue bumex  · F/u with PCP and cardiology PRN             A-fib ([address] ) - Primary     · Rate controlled  · Continue metoprolol amiodarone  · xarelto for anticoagulation  · F/u with cardiology         RESOLVED: CAD (coronary artery disease)       Genitourinary    UTI (urinary tract infection)     · Pt with reoccurring symptoms of UTI  · UA showed different bacteria  · PCP ordered cefuroxime  · Encourage fluids  · Monitor for any changes  · VNA RN in home will monitor             Other    Seizure-like activity ([facility] )     · Pt  asked about pt taking keppra and would she need to keep taking since she has no seizures  · I explained to pt and  that pt should keep taking keppra as prescribed  The medication is helping to prevent any new seizure activity  · Pt has f/u with neurology on 4/2/21, told  to bring his concerns up at that appointment for the neurologist to address  · Verbalized understanding  · Continue to monitor that pt taking keppra at this time           Depression     · VNA RN stated phone call to PCP by pt to discuss increased depression  · Pt would like something to help at this time  · PCP ordered sertraline 25mg daily  · VNA RN to help f/u on effect of sertraline  · Monitor for any changes                    Reason for visit is   Chief Complaint   Patient presents with    Virtual Regular Visit        Encounter provider Lillian Bateman, [facility]    Provider located at [address] [address] [address] [address] [id number] [phone]      Recent Visits  Date Type Provider Dept   03/17/21 Telephone Ralf Singh MD [facility]   Showing recent visits within past 7 days and meeting all other requirements     Future Appointments  No visits were found meeting these conditions  Showing future appointments within next 150 days and meeting all other requirements        The patient was identified by name and date of birth  Mariah Phelan was informed that this is a telemedicine visit and that the visit is being conducted through [facility] and patient was informed that this is a secure, HIPAA-compliant platform  She agrees to proceed     My office door was closed  No one else was in the room  She acknowledged consent and understanding of privacy and security of the video platform  The patient has agreed to participate and understands they can discontinue the visit at any time  Patient is aware this is a billable service  Subjective  Mariah Phelan is a 79 y o  female with past medical history of CHF, HTN and afib who during short term rehab stay was sent to the hospital for chest pain  Pt was in hospital for low ablation secondary to NSTEMI s/p stent placement prior to STR  Pt was evaluated by cardiology and was cleared to be discharged directly to home  Pt did not have any further chest pains and troponins were negative  Pt lives at home with  who takes care of medications and all ADL and IADL assistance  Daughter lives 15min away and is involved in care  Pt ambulates in home with wheeled walker, PT/OT to evaluate and treat in home, appetite is normal, denies any chest pain or general pain, no abd pain or constipation, denies cough or shortness of breath  F/u neurology 4/2/21  Pt with new increased depression, PCP prescribed sertraline         Past Medical History:   Diagnosis Date    Anal fissure     Cardiac disorder     Cognitive changes 12/23/2020    Esophageal reflux     Esophagitis, reflux  Hemorrhoids     Hepatic hemangioma     Last Assessed: 1/13/2015    Herpes zoster     History of colonic polyps     Hypertension     Ischemic colitis [facility] )     Lumbar herniated disc     [facility]     Nephrolithiasis     L  Lithotripsy    Nontoxic single thyroid nodule     Last Assessed: 1/13/2015    Osteoarthritis     Overactive bladder     Raynaud disease     Respiratory system disease     Sjogren's disease ([facility] )     Spinal stenosis     PONCHO (stress urinary incontinence, female)     Uterovaginal prolapse     Grade I-II       Past Surgical History:   Procedure Laterality Date    APPENDECTOMY  1947    CARDIAC SURGERY      CABG    CATARACT EXTRACTION Bilateral     COLONOSCOPY  2012    Fiberoptic    COLONOSCOPY      Resolved: 2006 - 2012 5 year f/u    CORONARY ANGIOPLASTY WITH STENT PLACEMENT      CORONARY ARTERY BYPASS GRAFT      Resolved: 2012    ESOPHAGOGASTRODUODENOSCOPY  2012    Diagnostic    HEMORROIDECTOMY      KNEE SURGERY      LITHOTRIPSY      Renal    MALIGNANT SKIN LESION EXCISION      Face; Resolved: 2004    CT ESOPHAGOGASTRODUODENOSCOPY TRANSORAL DIAGNOSTIC N/A 4/13/2016    Procedure: EGD AND COLONOSCOPY;  Surgeon: Jose Lynne MD;  [facility];   Service: Gastroenterology    RENAL ARTERY STENT      SKIN LESION EXCISION      Scalp    SOFT TISSUE TUMOR RESECTION      Shoulder; Resolved: 1995    THROMBOLYSIS      Postoperative Thrombolysis PTCA    TONSILLECTOMY      Resolved: 1944       Current Outpatient Medications   Medication Sig Dispense Refill    acetaminophen (TYLENOL) 325 mg tablet Take 2 tablets (650 mg total) by mouth every 6 (six) hours as needed for mild pain  0    amiodarone 200 mg tablet Take 1 tablet (200 mg total) by mouth daily with breakfast 30 tablet 0    atorvastatin (LIPITOR) 20 mg tablet Take 1 tablet (20 mg total) by mouth daily with dinner 30 tablet 0    bumetanide (BUMEX) 2 mg tablet Take 1 tablet (2 mg total) by mouth daily 30 tablet 0    cefuroxime (CEFTIN) 250 mg tablet Take 1 tablet (250 mg total) by mouth every 12 (twelve) hours for 7 days 14 tablet 0    levETIRAcetam (KEPPRA) 500 mg tablet Take 1 tablet (500 mg total) by mouth every 12 (twelve) hours 60 tablet 0    magnesium oxide (MAG-OX) 400 mg Take 1 tablet (400 mg total) by mouth 2 (two) times a day before lunch and dinner  0    metoprolol succinate (TOPROL-XL) 25 mg 24 hr tablet Take 1 tablet (25 mg total) by mouth daily 30 tablet 0    multivitamin (THERAGRAN) TABS Take 1 tablet by mouth daily   nitroglycerin (NITROSTAT) 0 4 mg SL tablet Place 1 tablet (0 4 mg total) under the tongue every 5 (five) minutes as needed for chest pain 25 tablet 0    pantoprazole (PROTONIX) 40 mg tablet Take 1 tablet (40 mg total) by mouth daily in the early morning 30 tablet 0    potassium chloride (K-DUR,KLOR-CON) 20 mEq tablet Take 2 tablets (40 mEq total) by mouth daily 60 tablet 0    prasugrel (EFFIENT) tablet Take 1 tablet (10 mg total) by mouth daily 30 tablet 0    psyllium (METAMUCIL) packet Take 1 packet by mouth daily  0    rivaroxaban (XARELTO) 15 mg tablet Take 1 tablet (15 mg total) by mouth daily with dinner 30 tablet 0    sertraline (ZOLOFT) 25 mg tablet Take 1 tablet (25 mg total) by mouth daily 30 tablet 2     No current facility-administered medications for this visit  Allergies   Allergen Reactions    Sulfa Antibiotics Anaphylaxis    Formaldehyde     Codeine Palpitations       Review of Systems   Constitutional: Negative for chills and fever  HENT: Negative for ear pain and sore throat  Eyes: Negative for pain and visual disturbance  Respiratory: Negative for cough and shortness of breath  Cardiovascular: Negative for chest pain and palpitations  Gastrointestinal: Negative for abdominal pain and vomiting  Genitourinary: Negative for dysuria and hematuria  Musculoskeletal: Positive for gait problem  Negative for arthralgias and back pain  Skin: Negative for color change and rash  Neurological: Positive for weakness  Negative for seizures and syncope  All other systems reviewed and are negative  Video Exam    There were no vitals filed for this visit  Physical Exam  Vitals signs reviewed  Constitutional:       Appearance: She is well-developed  HENT:      Head: Normocephalic and atraumatic  Eyes:      Conjunctiva/sclera: Conjunctivae normal    Neck:      Musculoskeletal: Normal range of motion  Cardiovascular:      Rate and Rhythm: Normal rate and regular rhythm  Heart sounds: Normal heart sounds, S1 normal and S2 normal  No murmur  Pulmonary:      Effort: Pulmonary effort is normal  No respiratory distress  Breath sounds: Normal breath sounds  No wheezing  Abdominal:      General: Bowel sounds are normal  There is no distension  Palpations: Abdomen is soft  Tenderness: There is no abdominal tenderness  Musculoskeletal: Normal range of motion  Comments: Generalized weakness   Skin:     General: Skin is warm and dry  Neurological:      Mental Status: She is alert  Psychiatric:         Attention and Perception: Attention normal          Mood and Affect: Mood is depressed  Speech: Speech normal          Behavior: Behavior normal          Thought Content:  Thought content normal          Cognition and Memory: Cognition normal       Ambulation: Unassisted: yes            Walker: yes Wheelchair: no   Nonambulatory: no                            Basic ADLs Independent:   Hygiene: yes   Toileting: no     Bathing: yes     Dressing: yes   Eating: no         Transfers: yes                 IADLs:   Shopping: yes     Medications: yes         Finances: yes     Meals: yes        Driving: yes     Continence:   Stool: yes                     Urine: yes     Advance Directives  Code status: Level 3: DNAR and DNI  Living Will on file: Jak Jimenez [facility] agent      VIRTUAL VISIT DISCLAIMER    López Bailey acknowledges that she has consented to an online visit or consultation  She understands that the online visit is based solely on information provided by her, and that, in the absence of a face-to-face physical evaluation by the physician, the diagnosis she receives is both limited and provisional in terms of accuracy and completeness  This is not intended to replace a full medical face-to-face evaluation by the physician  López Bailey understands and accepts these terms

## 2021-03-19 NOTE — ASSESSMENT & PLAN NOTE
· VNA RN stated phone call to PCP by pt to discuss increased depression  · Pt would like something to help at this time  · PCP ordered sertraline 25mg daily  · VNA RN to help f/u on effect of sertraline  · Monitor for any changes

## 2021-03-19 NOTE — ASSESSMENT & PLAN NOTE
· Pt with reoccurring symptoms of UTI  · UA showed different bacteria  · PCP ordered cefuroxime  · Encourage fluids  · Monitor for any changes  · VNA RN in home will monitor

## 2021-03-19 NOTE — ASSESSMENT & PLAN NOTE
· Asymptomatic  · Continue to monitor weights  · Continue with low Na diet  · Continue bumex  · F/u with PCP and cardiology PRN

## 2021-03-19 NOTE — ASSESSMENT & PLAN NOTE
· Rate controlled  · Continue metoprolol amiodarone  · xarelto for anticoagulation  · F/u with cardiology

## 2021-03-19 NOTE — ASSESSMENT & PLAN NOTE
· Pt  asked about pt taking keppra and would she need to keep taking since she has no seizures  · I explained to pt and  that pt should keep taking keppra as prescribed  The medication is helping to prevent any new seizure activity  · Pt has f/u with neurology on 4/2/21, told  to bring his concerns up at that appointment for the neurologist to address  · Verbalized understanding  · Continue to monitor that pt taking keppra at this time

## 2021-03-21 PROBLEM — Z79.899 LONG TERM CURRENT USE OF AMIODARONE: Status: ACTIVE | Noted: 2021-03-21

## 2021-03-21 PROBLEM — Z95.1 HX OF CABG: Status: ACTIVE | Noted: 2021-03-21

## 2021-03-21 PROBLEM — Z79.01 CURRENT USE OF LONG TERM ANTICOAGULATION: Status: ACTIVE | Noted: 2021-03-21

## 2021-03-21 PROBLEM — I49.5 SICK SINUS SYNDROME (HCC): Status: ACTIVE | Noted: 2021-03-21

## 2021-03-21 PROBLEM — I25.10 CAD (CORONARY ARTERY DISEASE): Status: ACTIVE | Noted: 2021-03-21

## 2021-03-21 PROBLEM — I70.1 RENAL ARTERY STENOSIS (HCC): Status: ACTIVE | Noted: 2021-03-21

## 2021-03-21 PROBLEM — I27.20 PULMONARY HYPERTENSION (HCC): Status: ACTIVE | Noted: 2021-03-21

## 2021-03-22 DIAGNOSIS — R56.9 SEIZURE-LIKE ACTIVITY (HCC): ICD-10-CM

## 2021-03-23 DIAGNOSIS — R07.9 CHEST PAIN: ICD-10-CM

## 2021-03-23 DIAGNOSIS — I48.91 A-FIB (HCC): ICD-10-CM

## 2021-03-23 DIAGNOSIS — I50.40 COMBINED CONGESTIVE SYSTOLIC AND DIASTOLIC HEART FAILURE (HCC): ICD-10-CM

## 2021-03-23 DIAGNOSIS — I25.10 CORONARY ARTERY DISEASE INVOLVING NATIVE HEART WITHOUT ANGINA PECTORIS, UNSPECIFIED VESSEL OR LESION TYPE: Chronic | ICD-10-CM

## 2021-03-23 DIAGNOSIS — R56.9 SEIZURE-LIKE ACTIVITY (HCC): ICD-10-CM

## 2021-03-23 RX ORDER — POTASSIUM CHLORIDE 20 MEQ/1
40 TABLET, EXTENDED RELEASE ORAL DAILY
Qty: 60 TABLET | Refills: 8 | Status: SHIPPED | OUTPATIENT
Start: 2021-03-23

## 2021-03-23 RX ORDER — ATORVASTATIN CALCIUM 20 MG/1
20 TABLET, FILM COATED ORAL
Qty: 30 TABLET | Refills: 8 | Status: SHIPPED | OUTPATIENT
Start: 2021-03-23 | End: 2022-03-26

## 2021-03-23 RX ORDER — BUMETANIDE 2 MG/1
2 TABLET ORAL DAILY
Qty: 30 TABLET | Refills: 8 | Status: SHIPPED | OUTPATIENT
Start: 2021-03-23 | End: 2022-02-01

## 2021-03-23 RX ORDER — LEVETIRACETAM 500 MG/1
TABLET ORAL
Qty: 60 TABLET | Refills: 0 | Status: SHIPPED | OUTPATIENT
Start: 2021-03-23 | End: 2021-04-26 | Stop reason: SDUPTHER

## 2021-03-23 RX ORDER — LEVETIRACETAM 500 MG/1
500 TABLET ORAL EVERY 12 HOURS SCHEDULED
Qty: 60 TABLET | Refills: 0 | OUTPATIENT
Start: 2021-03-23

## 2021-03-23 RX ORDER — AMIODARONE HYDROCHLORIDE 200 MG/1
200 TABLET ORAL
Qty: 30 TABLET | Refills: 8 | Status: SHIPPED | OUTPATIENT
Start: 2021-03-23 | End: 2022-02-01

## 2021-03-23 RX ORDER — PANTOPRAZOLE SODIUM 40 MG/1
40 TABLET, DELAYED RELEASE ORAL
Qty: 30 TABLET | Refills: 8 | Status: SHIPPED | OUTPATIENT
Start: 2021-03-23 | End: 2021-10-20

## 2021-03-23 RX ORDER — METOPROLOL SUCCINATE 25 MG/1
25 TABLET, EXTENDED RELEASE ORAL DAILY
Qty: 30 TABLET | Refills: 8 | Status: SHIPPED | OUTPATIENT
Start: 2021-03-23 | End: 2022-03-26

## 2021-03-23 NOTE — TELEPHONE ENCOUNTER
Patient's  requesting this be refilled, she will run out of medication before her 4/2 appt with Dr Mendiola Patient  Advised he contact PCP to refill until the patient is seen in our office  He verbalized understanding

## 2021-03-30 DIAGNOSIS — Z23 ENCOUNTER FOR IMMUNIZATION: ICD-10-CM

## 2021-03-31 ENCOUNTER — PATIENT OUTREACH (OUTPATIENT)
Dept: FAMILY MEDICINE CLINIC | Facility: MEDICAL CENTER | Age: 81
End: 2021-03-31

## 2021-03-31 NOTE — PROGRESS NOTES
Pt was discharged from [facility] services  She is scheduled to see Neurology tomorrow  Denies further symptom of urinary infection  Completed antibiotics  Denies questions or need for additional assistance  Will continue to follow for remainder of bundled episode

## 2021-04-07 NOTE — PLAN OF CARE
Problem: Nutrition/Hydration-ADULT  Goal: Nutrient/Hydration intake appropriate for improving, restoring or maintaining nutritional needs  Description: Monitor and assess patient's nutrition/hydration status for malnutrition  Collaborate with interdisciplinary team and initiate plan and interventions as ordered  Monitor patient's weight and dietary intake as ordered or per policy  Utilize nutrition screening tool and intervene as necessary  Determine patient's food preferences and provide high-protein, high-caloric foods as appropriate       INTERVENTIONS:  - Monitor oral intake, urinary output, labs, and treatment plans  - Assess nutrition and hydration status and recommend course of action  - Evaluate amount of meals eaten  - Assist patient with eating if necessary   - Allow adequate time for meals  - Recommend/ encourage appropriate diets, oral nutritional supplements, and vitamin/mineral supplements  - Order, calculate, and assess calorie counts as needed  - Recommend, monitor, and adjust tube feedings and TPN/PPN based on assessed needs  - Assess need for intravenous fluids  - Provide specific nutrition/hydration education as appropriate  - Include patient/family/caregiver in decisions related to nutrition  Outcome: Progressing Self

## 2021-04-12 ENCOUNTER — OFFICE VISIT (OUTPATIENT)
Dept: NEUROLOGY | Facility: CLINIC | Age: 81
End: 2021-04-12
Payer: MEDICARE

## 2021-04-12 VITALS
WEIGHT: 127 LBS | SYSTOLIC BLOOD PRESSURE: 148 MMHG | BODY MASS INDEX: 25.6 KG/M2 | HEIGHT: 59 IN | DIASTOLIC BLOOD PRESSURE: 90 MMHG | HEART RATE: 86 BPM

## 2021-04-12 DIAGNOSIS — R56.9 SEIZURE-LIKE ACTIVITY (HCC): Primary | ICD-10-CM

## 2021-04-12 DIAGNOSIS — I48.0 PAROXYSMAL ATRIAL FIBRILLATION (HCC): ICD-10-CM

## 2021-04-12 DIAGNOSIS — I10 ESSENTIAL HYPERTENSION: Chronic | ICD-10-CM

## 2021-04-12 DIAGNOSIS — Z95.1 HX OF CABG: ICD-10-CM

## 2021-04-12 DIAGNOSIS — E78.5 HYPERLIPIDEMIA, UNSPECIFIED HYPERLIPIDEMIA TYPE: ICD-10-CM

## 2021-04-12 PROCEDURE — 99215 OFFICE O/P EST HI 40 MIN: CPT | Performed by: PSYCHIATRY & NEUROLOGY

## 2021-04-12 NOTE — PROGRESS NOTES
Martine Celis is a 79 y o  female  Chief Complaint   Patient presents with    History of CVA       Assessment:  1  Seizure-like activity ([address] )    2  Hyperlipidemia, unspecified hyperlipidemia type    3  Hx of CABG    4  Paroxysmal atrial fibrillation (HCC)    5  Essential hypertension        Plan:   check a Keppra level    Continue with Keppra 500 mg twice a day for now depending on the blood work could consider decreasing it to 250 mg twice a day  continue with seizure precautions  patient would benefit from a detailed memory testing next visit  follow-up in 3 months      Discussion:   patient's recent hospital records and imaging study results were reviewed, it is not clear if patient had a seizure in the hospital, she had received Reglan and after that she was not responding to questions and was noted to have a right-sided gaze preference and suspicion for seizure like activity, patient was started on Keppra in the hospital, she is currently taking 500 mg twice a day, I have advised him to have a Keppra level checked, the family will call me couple of days after that and will discuss and could consider decreasing it to 250 mg twice a day as my suspicion for seizure is low and also the  feels that patient is tired on the current dose of 500 twice a day, she was advised to continue with seizure precautions, we would need formal Aroda evaluation at the next visit as I think patient could be having mild cognitive impairment, she was advised to be under constant supervision she does not drive, to keep her blood pressure cholesterol and sugar under control, to go to the hospital if has any worsening symptoms and call me otherwise to see me back in 3 months and follow up with other physicians      Subjective:    HPI    79-year-old female with past medical history consisting of hypertension, coronary artery disease status post 4 stents heart failure with preserved ejection fraction, hyperlipidemia who Recently admitted to [facility] for chest pain and underwent cardiac catheterization with stent placement and then developed 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, her hospital course was complicated by pneumonia which required treatment with antibiotics and she was started on anticoagulation for AFib on Xarelto, there was a concern of change in mental status in the hospital as patient was confused and not answering questions and they felt that she could have had a seizure and was started on Keppra 500 mg twice a day, according to the  patient is tired on the medication but the patient is denying it, she otherwise seems to be tolerating it well, at baseline she has some issues with her short-term memory, she has not been driving,  She is able to do her ADLs, no other complaints      Vitals:    04/12/21 1359   BP: 148/90   BP Location: Left arm   Patient Position: Sitting   Cuff Size: Adult   Pulse: 86   Weight: 57 6 kg (127 lb)   Height: 4' 11" (1 499 m)       Current Medications    Current Outpatient Medications:     acetaminophen (TYLENOL) 325 mg tablet, Take 2 tablets (650 mg total) by mouth every 6 (six) hours as needed for mild pain, Disp: , Rfl: 0    amiodarone 200 mg tablet, Take 1 tablet (200 mg total) by mouth daily with breakfast, Disp: 30 tablet, Rfl: 8    atorvastatin (LIPITOR) 20 mg tablet, Take 1 tablet (20 mg total) by mouth daily with dinner, Disp: 30 tablet, Rfl: 8    bumetanide (BUMEX) 2 mg tablet, Take 1 tablet (2 mg total) by mouth daily, Disp: 30 tablet, Rfl: 8    levETIRAcetam (KEPPRA) 500 mg tablet, TAKE 1 TABLET BY MOUTH EVERY 12 HOURS, Disp: 60 tablet, Rfl: 0    magnesium oxide (MAG-OX) 400 mg, Take 1 tablet (400 mg total) by mouth 2 (two) times a day before lunch and dinner, Disp: , Rfl: 0    metoprolol succinate (TOPROL-XL) 25 mg 24 hr tablet, Take 1 tablet (25 mg total) by mouth daily, Disp: 30 tablet, Rfl: 8   multivitamin (THERAGRAN) TABS, Take 1 tablet by mouth daily  , Disp: , Rfl:     nitroglycerin (NITROSTAT) 0 4 mg SL tablet, Place 1 tablet (0 4 mg total) under the tongue every 5 (five) minutes as needed for chest pain, Disp: 25 tablet, Rfl: 0    pantoprazole (PROTONIX) 40 mg tablet, Take 1 tablet (40 mg total) by mouth daily in the early morning, Disp: 30 tablet, Rfl: 8    potassium chloride (K-DUR,KLOR-CON) 20 mEq tablet, Take 2 tablets (40 mEq total) by mouth daily, Disp: 60 tablet, Rfl: 8    prasugrel (EFFIENT) tablet, Take 1 tablet (10 mg total) by mouth daily, Disp: 30 tablet, Rfl: 0    rivaroxaban (XARELTO) 15 mg tablet, Take 1 tablet (15 mg total) by mouth daily with dinner, Disp: 30 tablet, Rfl: 8    psyllium (METAMUCIL) packet, Take 1 packet by mouth daily (Patient not taking: Reported on 4/12/2021), Disp: , Rfl: 0    sertraline (ZOLOFT) 25 mg tablet, Take 1 tablet (25 mg total) by mouth daily, Disp: 30 tablet, Rfl: 2      Allergies  Sulfa antibiotics, Formaldehyde, and Codeine    Past Medical History  Past Medical History:   Diagnosis Date    Anal fissure     Cardiac disorder     Cognitive changes 12/23/2020    Esophageal reflux     Esophagitis, reflux     Hemorrhoids     Hepatic hemangioma     Last Assessed: 1/13/2015    Herpes zoster     History of colonic polyps     Hypertension     [facility]     Lumbar herniated disc     Malignant neoplasm without specification of site ([facility] [address] )     Nephrolithiasis     L   Lithotripsy    Nontoxic single thyroid nodule     Last Assessed: 1/13/2015    Osteoarthritis     Overactive bladder     Raynaud disease     Respiratory system disease     Sjogren's disease ([facility] )     Spinal stenosis     PONCHO (stress urinary incontinence, female)     Uterovaginal prolapse     Grade I-II         Past Surgical History:  Past Surgical History:   Procedure Laterality Date    APPENDECTOMY  1947    CARDIAC PACEMAKER PLACEMENT  01/2021   Theodore Santos CARDIAC SURGERY      CABG    CATARACT EXTRACTION Bilateral     COLONOSCOPY  2012    Fiberoptic    COLONOSCOPY      Resolved: 2006 - 2012 5 year f/u    CORONARY ANGIOPLASTY WITH STENT PLACEMENT      CORONARY ARTERY BYPASS GRAFT      Resolved: 2012    ESOPHAGOGASTRODUODENOSCOPY  2012    Diagnostic    HEMORROIDECTOMY      KNEE SURGERY      LITHOTRIPSY      Renal    MALIGNANT SKIN LESION EXCISION      Face; Resolved: 2004    MD ESOPHAGOGASTRODUODENOSCOPY TRANSORAL DIAGNOSTIC N/A 4/13/2016    Procedure: EGD AND COLONOSCOPY;  Surgeon: Lakisha Bailey MD;  [facility]; Service: Gastroenterology    RENAL ARTERY STENT      SKIN LESION EXCISION      Scalp    SOFT TISSUE TUMOR RESECTION      Shoulder; Resolved: 1995    THROMBOLYSIS      Postoperative Thrombolysis PTCA    TONSILLECTOMY      Resolved: 1944         Family History:  Family History   Problem Relation Age of Onset    Heart disease Mother     Hypertension Mother     Osteoporosis Mother     Heart disease Father     Hypertension Father     Ulcerative colitis Family     Colon polyps Family     No Known Problems Sister     Heart disease Brother     Diabetes Brother     No Known Problems Maternal Grandmother     No Known Problems Maternal Grandfather     No Known Problems Paternal Grandmother     No Known Problems Paternal Grandfather     No Known Problems Son     No Known Problems Daughter        Social History:   reports that she has never smoked  She has never used smokeless tobacco  She reports current alcohol use of about 1 0 standard drinks of alcohol per week  She reports that she does not use drugs  I have reviewed the past medical history, surgical history, social and family history, current medications, allergies vitals, review of systems, and updated this information as appropriate today  Objective:    Physical Exam    Neurological Exam       GENERAL:  Cooperative in no acute distress   Well-developed and well-nourished    HEAD and NECK   Head is atraumatic normocephalic with no lesions or masses  Neck is supple with full range of motion    CARDIOVASCULAR  Carotid Arteries-no carotid bruits  NEUROLOGIC:  Mental Status-the patient is awake alert and oriented without aphasia or apraxia  Cranial Nerves: Visual fields are full to confrontation  Extraocular movements are full without nystagmus  Pupils are 2-1/2 mm and reactive  Face is symmetrical to light touch  Movements of facial expression move symmetrically  Hearing is normal to finger rub bilaterally  Soft palate lifts symmetrically  Shoulder shrug is symmetrical  Tongue is midline without atrophy  Motor: No drift is noted on arm extension  Strength is full in the upper and lower extremities with normal bulk and tone  Sensory: Intact to temperature and vibratory sensation in the upper and lower extremities bilaterally  Cortical function is intact  Coordination: Finger to nose testing is performed accurately  Gait reveals a normal base with symmetrical arm swing  Reflexes:  2+ and symmetrical        ROS:  Review of Systems   Constitutional: Negative for appetite change, fatigue and fever  HENT: Positive for tinnitus  Negative for drooling, ear pain, trouble swallowing and voice change  Eyes: Negative for photophobia, pain and visual disturbance  Respiratory: Negative for chest tightness and shortness of breath  Cardiovascular: Negative for chest pain, palpitations and leg swelling  Gastrointestinal: Negative for abdominal pain, constipation, diarrhea, nausea and vomiting  Endocrine: Negative for cold intolerance and heat intolerance  Genitourinary: Negative for difficulty urinating, frequency and urgency  Musculoskeletal: Negative for back pain, gait problem, joint swelling, myalgias and neck pain  Skin: Negative for rash  Neurological: Positive for numbness (And tingling on left shoulder and left hand)   Negative for dizziness, tremors, seizures, syncope, facial asymmetry, speech difficulty, weakness, light-headedness and headaches  Psychiatric/Behavioral: Positive for dysphoric mood  Negative for agitation, behavioral problems, confusion, decreased concentration and sleep disturbance  The patient is not nervous/anxious and is not hyperactive

## 2021-04-18 DIAGNOSIS — R56.9 SEIZURE-LIKE ACTIVITY (HCC): ICD-10-CM

## 2021-04-19 ENCOUNTER — OFFICE VISIT (OUTPATIENT)
Dept: FAMILY MEDICINE CLINIC | Facility: MEDICAL CENTER | Age: 81
End: 2021-04-19
Payer: MEDICARE

## 2021-04-19 VITALS
HEIGHT: 59 IN | SYSTOLIC BLOOD PRESSURE: 110 MMHG | TEMPERATURE: 97.2 F | BODY MASS INDEX: 24.6 KG/M2 | DIASTOLIC BLOOD PRESSURE: 70 MMHG | HEART RATE: 60 BPM | WEIGHT: 122 LBS

## 2021-04-19 DIAGNOSIS — R56.9 SEIZURE-LIKE ACTIVITY (HCC): ICD-10-CM

## 2021-04-19 DIAGNOSIS — M79.642 HAND PAIN, LEFT: ICD-10-CM

## 2021-04-19 DIAGNOSIS — R56.9 SEIZURES (HCC): Primary | ICD-10-CM

## 2021-04-19 DIAGNOSIS — I10 ESSENTIAL HYPERTENSION: ICD-10-CM

## 2021-04-19 DIAGNOSIS — R41.3 MEMORY LOSS: ICD-10-CM

## 2021-04-19 DIAGNOSIS — I10 ESSENTIAL HYPERTENSION: Primary | Chronic | ICD-10-CM

## 2021-04-19 DIAGNOSIS — I25.10 CAD (CORONARY ARTERY DISEASE): ICD-10-CM

## 2021-04-19 DIAGNOSIS — R41.89 COGNITIVE CHANGES: ICD-10-CM

## 2021-04-19 PROCEDURE — 99214 OFFICE O/P EST MOD 30 MIN: CPT | Performed by: FAMILY MEDICINE

## 2021-04-19 NOTE — PROGRESS NOTES
Shubham Badillo is here for followup  She saw neurology for follow-up of her seizure disorder  Advised to stay on Keppra  The Keppra level be obtained  However she does think it makes her tired  She says fingers on her left hadn --4th and 5th--are numb  However on further questioning it seems that the whole hand feels numb  There is also pain in the hand and forearm  Patient reports this does not wake her up at night however  reports that he will often see her awake in the middle the night rubbing her hand  The left shoulder pain described last visit is better   still reports concern regarding her short-term memory  She often is confused as to the day and year  He does need to keep track of her medications  She does get irritable  She decided to take the sertraline we discussed at the last visit  Both report she might feel little bit better  O: /70 (BP Location: Left arm, Patient Position: Sitting, Cuff Size: Adult)   Pulse 60   Temp (!) 97 2 °F (36 2 °C)   Ht 4' 11" (1 499 m)   Wt 55 3 kg (122 lb)   BMI 24 64 kg/m²    Physical Exam  Constitutional:       General: She is not in acute distress  Appearance: She is well-developed  Neck:      Thyroid: No thyromegaly  Vascular: No carotid bruit  Cardiovascular:      Rate and Rhythm: Normal rate and regular rhythm  Heart sounds: Normal heart sounds  Pulmonary:      Effort: Pulmonary effort is normal       Breath sounds: Normal breath sounds  Abdominal:      General: Bowel sounds are normal       Palpations: Abdomen is soft  There is no hepatomegaly or mass  Tenderness: There is no abdominal tenderness  Lymphadenopathy:      Cervical: No cervical adenopathy  upper extremities show normal strength and  in the hands  Pinprick sensations normal   There is negative Phalen's and Tinel's signs  No muscle wasting noted    Assessment  1    Seizure disorder- based on   A confusionepisode in the hospital as well as an abnormal EEG in January  She is still taking Keppra  Blood level due  She will follow-up with Neurology  Reminded she is  not allowed to drive  Do not want to travel to [facility] and request a referral to neurology  They are requesting referral to a neurologist at [facility]  2  Cognitive changes- although she did reasonably well on the mini-mental status exam last visit,   continues to report confusion and short-term memory changes  I think she would benefit from referral to Geriatrics and  she is agreeable   3  Paresthesias meant pain left hand -suspect carpal tunnel syndrome  Will have her get a carpal tunnel wrist splint to see if this makes any difference  Otherwise consider Ortho referral     Plan  Geriatric referral   As above  Recheck 3 months

## 2021-04-20 RX ORDER — AMLODIPINE BESYLATE 5 MG/1
5 TABLET ORAL DAILY
Qty: 90 TABLET | Refills: 3 | Status: SHIPPED | OUTPATIENT
Start: 2021-04-20

## 2021-04-20 RX ORDER — LEVETIRACETAM 500 MG/1
TABLET ORAL
Qty: 60 TABLET | Refills: 0 | OUTPATIENT
Start: 2021-04-20

## 2021-04-20 RX ORDER — PRASUGREL 10 MG/1
10 TABLET, FILM COATED ORAL DAILY
Qty: 90 TABLET | Refills: 3 | Status: SHIPPED | OUTPATIENT
Start: 2021-04-20 | End: 2022-04-25

## 2021-04-22 NOTE — TELEPHONE ENCOUNTER
Per Lorena Sagastume, they are switching neurologists and the previous one never gave them any  Prescriptions so he does not think they will refill this

## 2021-04-23 ENCOUNTER — APPOINTMENT (OUTPATIENT)
Dept: LAB | Facility: MEDICAL CENTER | Age: 81
End: 2021-04-23
Payer: MEDICARE

## 2021-04-23 ENCOUNTER — TELEPHONE (OUTPATIENT)
Dept: FAMILY MEDICINE CLINIC | Facility: MEDICAL CENTER | Age: 81
End: 2021-04-23

## 2021-04-23 NOTE — TELEPHONE ENCOUNTER
Patient's  was in the office today for an apt and is requesting a refill on Verito's Levetiracetam 500mg tabs; one tab po twice daily  He stated that Ashu Jas is getting her lab work completed today  She has about 6 pill left

## 2021-04-23 NOTE — TELEPHONE ENCOUNTER
I spoke with Dr Tammy Steward, she agreed to send enough to hold over the patient until she can get ahold of Dr Mar Dawkins office  [facility] is aware and will call them Monday to followup

## 2021-04-26 ENCOUNTER — TELEPHONE (OUTPATIENT)
Dept: GERIATRICS | Age: 81
End: 2021-04-26

## 2021-04-26 DIAGNOSIS — R56.9 SEIZURE-LIKE ACTIVITY (HCC): ICD-10-CM

## 2021-04-26 RX ORDER — LEVETIRACETAM 500 MG/1
500 TABLET ORAL EVERY 12 HOURS
Qty: 60 TABLET | Refills: 3 | Status: SHIPPED | OUTPATIENT
Start: 2021-04-26

## 2021-04-26 NOTE — TELEPHONE ENCOUNTER
[place] [address]  [phone]    Telephone Intake: Geriatric Assessment     Referral source: Dr Nelson Guzmán                                          Caller/ (relationship to patient): Ramon Carolin (spouse)  's phone number: [phone]              Is there a POA in place/If so, who has POA? Yes     Reason for referral: Patient concerns  and Family member concerns regarding memory concerns  What is the goal of visit? Short term memory loss can be brought back somewhat  Patient is unable to successfully take medications; so caregiver (spouse) is responsible for pill distribution  Has the patient been seen by a Neurologist or Geriatrician? Yes (Neuro)  Patient had a recent seizure  Patient has not seen a Geriatrician  Has the patient ever been diagnosed with dementia? No      Preferred language? English  Does the patient wear glasses? Yes   Does the patient use hearing aids? No     Does the patient and/or caregiver have access for a virtual visit? No    Caller was informed: Please make sure the patient is accompanied by someone who knows them well / a caregiver / family member to participate in this appointment  If a patient plans to attend the assessment alone, please route a message to the assigned provider  Office packet mailed out?  Yes

## 2021-04-26 NOTE — LETTER
April 26, 2021    Flip Cleaning  [address] [facility]    Dear Flip Cleaning,    Thank you for choosing [address] [place] [address]  We assist in the needs of older adults and their families  Our goal is to help aging adults in maintaining a healthy, safe and independent lifestyle and to work with their primary care physician to coordinate care  There are many reasons appointments are scheduled with us  Some patients are seeking a baseline assessment as part of medical history; other patients might have a specific concern regarding cognition, multiple medications, or overall health concerns  What to Expect  Two, possibly, three visits:     Initial Visit is approximately one hour  The patient and representative/family meet with the doctor, possibly a resident/fellow, and the   In addition to medical issues, patient goals, quality of life, home safety, and greatest areas of concern are addressed  If necessary, additional testing may be ordered such as bloodwork, imaging, and/or a computerized cognitive evaluation  A physical therapy/occupation therapy evaluation may also be ordered  Conference visit is approximately one hour  The patient and representative/family review an individualized care plan targeting patient's goals/concerns/issues and are provided with recommendations and resources  [facility]  is a teaching institution and there will be residents and fellows as a part of your visits with us       Please bring the following to your initial appointment:      A mask without a valve (to be worn by everyone entering the building)    Eye glasses and/or hearing aids (as applicable)    Enclosed forms (please complete and bring to appointment)    Advance directives (a living will and/or durable power of ) if established    Medication list (including vitamins or supplements you are currently taking)       DATE of INITIAL VISIT: Tuesday, August 17, 2021 at 4:00 PM    Please arrive at least 15 minutes before scheduled appointment time  When you park, please call our office at [phone] to let us know you have arrived  We will conduct check-in by telephone  One person may accompany you to your appointment  DATE of CARE CONFERENCE: Tuesday, September 7, 2021 at 1:00 PM    For Video Conference: [facility] office will call 30-45 minutes prior to appointment complete check in, review medications and allergies, as well as ask for vitals  If you can take the patient's blood pressure, pulse, temperature, and weight on the day of this appointment, it would be appreciated  For In-office Conference: One person may accompany the patient to appointment  Others are welcome to join by phone or video  Please let us know which you prefer so we can provide a conference phone number or a video link  PLEASE NOTE: Due to the extensive and comprehensive nature of the visit, if you are more than 15 minutes late we will need to reschedule the appointment  Thank you again for choosing [facility]  We look forward to meeting you!

## 2021-05-03 ENCOUNTER — CONSULT (OUTPATIENT)
Dept: GERIATRICS | Age: 81
End: 2021-05-03
Payer: MEDICARE

## 2021-05-03 VITALS
BODY MASS INDEX: 26.57 KG/M2 | OXYGEN SATURATION: 95 % | SYSTOLIC BLOOD PRESSURE: 152 MMHG | RESPIRATION RATE: 16 BRPM | DIASTOLIC BLOOD PRESSURE: 64 MMHG | TEMPERATURE: 97.3 F | HEIGHT: 59 IN | WEIGHT: 131.8 LBS | HEART RATE: 76 BPM

## 2021-05-03 DIAGNOSIS — E78.5 HYPERLIPIDEMIA, UNSPECIFIED HYPERLIPIDEMIA TYPE: ICD-10-CM

## 2021-05-03 DIAGNOSIS — G31.84 MILD COGNITIVE IMPAIRMENT: Primary | ICD-10-CM

## 2021-05-03 DIAGNOSIS — I48.0 PAROXYSMAL ATRIAL FIBRILLATION (HCC): ICD-10-CM

## 2021-05-03 DIAGNOSIS — I10 ESSENTIAL HYPERTENSION: Chronic | ICD-10-CM

## 2021-05-03 DIAGNOSIS — R56.9 SEIZURE-LIKE ACTIVITY (HCC): ICD-10-CM

## 2021-05-03 DIAGNOSIS — I25.810 CORONARY ARTERY DISEASE INVOLVING OTHER CORONARY ARTERY BYPASS GRAFT WITHOUT ANGINA PECTORIS: ICD-10-CM

## 2021-05-03 DIAGNOSIS — F32.A DEPRESSION, UNSPECIFIED DEPRESSION TYPE: ICD-10-CM

## 2021-05-03 DIAGNOSIS — I49.5 SICK SINUS SYNDROME (HCC): ICD-10-CM

## 2021-05-03 PROBLEM — R41.3 MEMORY LOSS: Status: ACTIVE | Noted: 2021-05-03

## 2021-05-03 PROCEDURE — 99215 OFFICE O/P EST HI 40 MIN: CPT | Performed by: INTERNAL MEDICINE

## 2021-05-03 NOTE — PROGRESS NOTES
[address]  [phone]  Social Work Intake    SOCIAL HISTORY  Patient: Ag French  Date:5/3/2021  Current Living Situation: Ranch-style home, all on one floor, with a few steps throughout      Patients main concern(s): Maurice Millard would like to get her 's license back  Caregiver main concern(s): Memory concerns    FAMILY BACKGROUND  Marital status:                                        Spouse's Name: Hector Claudio   Does patient have children? Yes   Where do the children live? Per chart review, Verito's daughter Eleni Merlin) lives 15 minutes away and is involved in her care  Family members assisting patient at home: Leander Bradley () and Thania Griffiths (daughter)    EMPLOYMENT  Currently Employed:Yes  Retired: Yes    EDUCATION  Highest Level of Education: Some College (No Degree) Reported taking a couple of college classes    LEGAL  Advanced directive and living will on file  Per phone intake note, Maurice Millard has a POA  For all patients, we encourage seeing a  to establish a Financial Power of , a [address], and an Advanced Directive (living will)  Particularly for patients experiencing memory concerns, we strongly recommend that this is completed as soon as possible  SAFETY ASSESSMENT     FIRE HAZARDS  Does the residence have smoke alarm? Yes     Does the residence have a fire escape? Yes     FALL HAZARDS  Do any of the following exist in the residence? Poor lighting       No   Loose Rugs       No Leander reporting removing all of the throw rugs throughout the home   Obstacles       No   Uneven floors       No   Slippery floors       No Leander Degree reported that there is carpeting, hardwood, and tile throughout the home   Unsafe stairs       No  Does the patient use a fall alert? No      HEALTH HAZARDS   Does the residence have any of the following?    Adequate plumbing      Yes    Adequate heat       Yes    Adequate ventilation      Yes     EMERGENCY HEALTH PLAN   Is there a phone that is accessible to the patient or caregiver? Yes   Is the number to police, physician, and 911 easily accessible? Yes   Would the patient be able to call 911 in an emergency? Yes     ENVIRONMENT APPROPRIATENESS  Please note if each is available and accessible to the patient:   [address]        Yes   Kitchen        Yes   Living Room        Yes     Is the patient able to climb stairs if necessary? Yes    Does the patient use any assistant devices for ambulation? Yes     If yes, please list which device required   Has a walker and cane, Se Mg reports that she uses it at night to go to the bathroom  Does the bathroom have any of the following? Handrails in tub or toilet     Yes  in the shower   Rubber tub mat      Yes  walk-in shower, has a textured floor, doesn't use the tub to get in and out since knee surgery   Hot water       Yes     Can the patient independently do the following? Shower       Yes    Dress them selves      Yes     Pick appropriate clothing     Yes   Eat        Yes    Drink        Yes         [place] Cognitive Assessment (MoCA) Version 8 2  Education: Some College    Points Earned POSSIBLE Points   Visuospatial/Executive   Alternating [place] Making 0 1   Visuoconstructional skills 1 1   Visuoconstructional skills (clock) 1 3   Naming   Naming Animals 3 3   Attention   Digit Span 2 2   Vigilance (letters) 1 1   Serial 7 subtraction 3 3   Language   Sentence Repetition 2 2   Verbal fluency 1 1   Abstraction   Abstraction (word pairings) 1 2   Delayed recall   Delayed recall 0 5   Memory index score: 0/15   Orientation   Orientation 4 6   TOTAL SCORE: 19/30  (Normal ?26/30)   Additional notes:     Per chart review, Jasen New completed a MoCA while in [facility] February 2021, scoring a 21/30

## 2021-05-03 NOTE — PATIENT INSTRUCTIONS
1  Continue to stay active physically, mentally and socially  2  Follow up with specialist and PCP periodically for management of your chronic conditions and preventative testing  3  Follow up in 2-3 weeks for Mindstream Assessment  4  Contact PCP prior to starting Over the counter medications  Avoid OTC medications that can affect cognition including Benadryl, Tyelnol PM, etc    5  Consider brain stimulating activities including crossword puzzles, word search, reading or even learning a new hobby  6  Maintain a well balanced diet  Incorporate lots of vegetables and fruits in your diet  If symptoms worsen or new symptoms arise, please inform provider ([phone])    Things that we know are helpful for thinking and memory   1  Exercise program: gradually increase your physical activity over time  Start small and be patient  Aerobic (cardio) activity is best but incorporate balance, strength and flexibility training as well    a  Try to get at least 30min 3 times per week   2  Diet: Mediterranean diet (colorful fruits and vegetables, olive oil, fish, whole grains, very little red meet is any), MIND diet, anti-inflammatory diet  a  Stay well hydrated: drink 6-8 glasses of water per day   b  What's good for your heart is good for your brain  c  Avoid high-salt foods   3  Sleep: aim for at least 7-8 hours per night   a  Avoid alcohol and medications like Benadryl (Tylenol PM) or other sedating drugs  4  Stress management/mindfulness practice:   a  Talk with our social workers about finding a cognitive behavioral therapists  b  Try a smart phone [place] like Numote or mindfulness for beginners   i  Try curable for pain    c  Take a course in Mindfulness Based Stress Reduction (MBSR)   delbert nice  Read or listen to an audiobook about it:  i  Mindfulness for beginners   ii  10% happier  iii  The happiness advantage   5   Social engagement:   a  Stay in touch with family and friends b  Plan a few specific activities for your social health every week   neeta el local support group   d  Volunteer! MIND diet score:  1 point for each component      · Green leafy vegetables: at least 6 per week  · Other vegetable: at least 1 per day   · Berries: at least 2 per week  · Red meat: fewer than 4 per week   · Fish: at least 1 per week   · Poultry: at least 2 per week  · Beans: at least 3 per week  · Nuts: at least 5 per week  · Fast or fried food: less than 1 per week  · Olive oil   · Butter less: less than 1 table-spoon per day   · Cheese: less than 1 serving per week   · Pastries/sweets: less than 5 servings per week  · Alcohol: no more than 1 serving/ day

## 2021-05-03 NOTE — PROGRESS NOTES
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  - independent with her ADLs, needs assistance with iadls  - [facility] in office today 19/30  - [address] in 2/21: 21/30  - CT head: mild microangiopathic changes  -patient encouraged to stay active physically, mentally and socially  - Will order Mindstream   - driving safety concerns:  Patient is currently not driving due to recent seizure like activity  - patient does not have fall alert, will discuss in detail at family conference  - has advance directives in place  - caregiver burnout addressed  - current plan discuss at length with patient and , all questions were answered  - patient will return in 3 weeks for family conference and family will call in interim with questions or concerns   - maintain good control of all chronic medical conditions with close follow up with PCP and specialist  - TUG: 10 seconds, low risk for falls  - Medications reviewed, appears appropriate at this time    2  Essential hypertension  - blood pressures slightly elevated today, could be secondary to stress per   - Encouraged patient and family to continue checking blood pressures at home and if it continues to be elevated, advised to follow up with PCP/specialist  -Goal BP< 150/90  -Currently maintained on Toprol 25mg q daily and amlodipine 5mg once daily    3  Coronary artery disease involving other coronary artery bypass graft without angina pectoris  - ECHO 1/30/21: EF: 50%, mild to moderate valvular disease  - Continue Pasgruel once daily, lipitor 20mg once daily  - continue nitrostat 0 4mg PRN  - Follow up with PCP or specialist    6   Seizure-like activity (HCC)  - seizure like activity noted during recent hospitalization  - Currently no new episodes  - Currently not driving  - Continue follow up with neurology     7  Hyperlipidemia, unspecified hyperlipidemia type  - Lipid panel reviewed  - Continue Lipitor    8  Depression, unspecified depression type  - currently stable  - GDS: 2  - Continue Zoloft 25mg once daily    9  Paroxysmal atrial fibrillation (HCC)  - heart rate stable, goal heart rate of <100  - Continue Toprol 25mg once daily for rate control  - Continue Xarelto 15mg daily for stroke prophylaxis    10  Sick sinus syndrome [facility])  - currently denies any symptoms  - s/p pacemaker placement  - encouraged routine device monitoring and follow up  Orders Placed This Encounter   Procedures    MindStreams Assessment       - Counseling Documentation: patient was counseled regarding: instructions for management, patient and family education and risks and benefits of treatment options    Chief Complaint     Chief Complaint   Patient presents with    Geriatric Evaluation       History of Present Illness     I had the pleasure of evaluating Rickey Estrella who is a 79 y o  female in [facility] today  Memory issues were first noticed byfamily  They have been present since 1 year(s), and include  short term memory loss  Patient  reports that her short term memory loss appears have worsened since her recent hospitalization for NSTEMI in January  During this hospitalization, patient was also found to have atrial fibrillation, sick sinus syndrome and also had subsequent pacemaker placement  Rickey Estrella reports she does not  Drive currently due to her license being revoked for a recent seizure like activity noted during hospitalization  She is currently maintained on Keppra and has a follow up with neurology  Prior to hospitalization, patient drove locally, she  does not get lost in familiar settings, and has not had recent citations or accidents  She manages her spouse's and her finances   Although,  reports that he has noted some errors hence has been supervising patient  She does not notice changes in her own mood or personality and but is currently on Zoloft which patient reports helps  She does not note any hearing or vision issues and does not report any sleep issues  They do have a living will and does not have an appointed POA  Family members accompanying the patient today include , who is the primary caregiver  He notes that at times patient forgets dates and at times can be very repetitive  Notes that she is active functionally  Denies any falls  The following portions of the patient's history were reviewed and updated as appropriate: allergies, current medications, past family history, past medical history, past social history, past surgical history and problem list     Review of Systems     Review of Systems   Constitutional: Negative for activity change, appetite change, fatigue and fever  HENT: Negative for congestion, ear discharge, rhinorrhea and sore throat  Eyes: Negative for pain and discharge  Respiratory: Negative for cough, chest tightness and shortness of breath  Cardiovascular: Negative for chest pain, palpitations and leg swelling  Gastrointestinal: Negative for abdominal distention, abdominal pain, constipation, diarrhea, nausea and vomiting  Genitourinary: Negative for difficulty urinating, dysuria and frequency  Musculoskeletal: Negative for arthralgias, back pain and myalgias  Skin: Negative for rash  Neurological: Negative for dizziness, weakness, numbness and headaches  Psychiatric/Behavioral: Negative for sleep disturbance and suicidal ideas         Active Problem List     Patient Active Problem List   Diagnosis    Essential hypertension    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)    A-fib (HCC)    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HCC)    PAD (peripheral artery disease) (HCC)    Abnormal liver enzymes    Edema of left upper extremity    Urinary retention    Seizure-like activity (HCC)    Hyperlipidemia    Toxic metabolic encephalopathy    UTI (urinary tract infection)    Depression    Current use of long term anticoagulation    Long term current use of amiodarone    Renal artery stenosis (HCC)    Pulmonary hypertension (HCC)    Sick sinus syndrome (HCC)    Hx of CABG    CAD (coronary artery disease)       Objective     /64 (BP Location: Left arm, Patient Position: Sitting, Cuff Size: Adult)   Pulse 76   Temp (!) 97 3 °F (36 3 °C) (Temporal)   Resp 16   Ht 4' 11" (1 499 m) Comment: WITH SHOES  Wt 59 8 kg (131 lb 12 8 oz) Comment: WITH SHOES  SpO2 95%   BMI 26 62 kg/m²     Physical Exam  Constitutional:       General: She is not in acute distress  Appearance: She is well-developed  She is not diaphoretic  HENT:      Head: Normocephalic and atraumatic  Nose: Nose normal    Eyes:      General:         Right eye: No discharge  Left eye: No discharge  Conjunctiva/sclera: Conjunctivae normal       Pupils: Pupils are equal, round, and reactive to light  Neck:      Musculoskeletal: Normal range of motion and neck supple  Cardiovascular:      Rate and Rhythm: Normal rate and regular rhythm  Heart sounds: Normal heart sounds  No murmur  Pulmonary:      Effort: Pulmonary effort is normal  No respiratory distress  Breath sounds: Normal breath sounds  Abdominal:      General: Bowel sounds are normal  There is no distension  Palpations: Abdomen is soft  Tenderness: There is no abdominal tenderness  Musculoskeletal:         General: No tenderness  Right lower leg: No edema  Left lower leg: No edema  Comments: TU seconds   Skin:     General: Skin is warm  Neurological:      Mental Status: She is alert        Comments: MOCA:          Pertinent Laboratory/Diagnostic Studies:  CBC:   Lab Results   Component Value Date/Time    WBC 6 18 2021 06:36 AM    WBC 4 91 12/24/2015 06:52 AM    RBC 3 86 02/24/2021 06:36 AM    RBC 4 17 12/24/2015 06:52 AM    HGB 11 9 02/24/2021 06:36 AM    HGB 12 1 12/24/2015 06:52 AM    HCT 36 7 02/24/2021 06:36 AM    HCT 37 7 12/24/2015 06:52 AM    MCV 95 02/24/2021 06:36 AM    MCV 90 12/24/2015 06:52 AM    MCH 30 8 02/24/2021 06:36 AM    MCH 29 0 12/24/2015 06:52 AM    MCHC 32 4 02/24/2021 06:36 AM    MCHC 32 1 12/24/2015 06:52 AM    RDW 14 8 02/24/2021 06:36 AM    RDW 13 6 12/24/2015 06:52 AM    MPV 10 4 02/24/2021 06:36 AM    MPV 10 1 12/24/2015 06:52 AM     02/24/2021 06:36 AM     12/24/2015 06:52 AM    NRBC 0 02/24/2021 06:36 AM    NEUTOPHILPCT 38 (L) 02/24/2021 06:36 AM    NEUTOPHILPCT 63 12/24/2015 06:52 AM    LYMPHOPCT 51 (H) 02/24/2021 06:36 AM    LYMPHOPCT 22 12/24/2015 06:52 AM    MONOPCT 8 02/24/2021 06:36 AM    MONOPCT 12 12/24/2015 06:52 AM    EOSPCT 2 02/24/2021 06:36 AM    EOSPCT 3 12/24/2015 06:52 AM    BASOPCT 1 02/24/2021 06:36 AM    BASOPCT 1 04/14/2014 10:34 AM    NEUTROABS 2 33 02/24/2021 06:36 AM    NEUTROABS 3 09 12/24/2015 06:52 AM    LYMPHSABS 3 21 02/24/2021 06:36 AM    LYMPHSABS 1 08 12/24/2015 06:52 AM    MONOSABS 0 48 02/24/2021 06:36 AM    MONOSABS 0 59 12/24/2015 06:52 AM    EOSABS 0 10 02/24/2021 06:36 AM    EOSABS 0 15 12/24/2015 06:52 AM     Chemistry Profile:   Lab Results   Component Value Date/Time     12/24/2015 06:52 AM    K 4 0 02/24/2021 06:36 AM    K 3 6 12/24/2015 06:52 AM     02/24/2021 06:36 AM     12/24/2015 06:52 AM    CO2 31 02/24/2021 06:36 AM    CO2 33 (H) 01/29/2021 05:46 PM    ANIONGAP 7 12/24/2015 06:52 AM    BUN 15 02/24/2021 06:36 AM    BUN 9 12/24/2015 06:52 AM    CREATININE 1 22 02/24/2021 06:36 AM    CREATININE 0 77 12/24/2015 06:52 AM    GLUC 94 02/24/2021 06:36 AM    GLUF 81 01/29/2021 05:47 AM    GLUCOSE 106 01/29/2021 05:46 PM    GLUCOSE 97 12/24/2015 06:52 AM    CALCIUM 8 9 02/24/2021 06:36 AM    CALCIUM 8 6 12/24/2015 06:52 AM CORRECTEDCA 9 5 02/24/2021 06:36 AM    MG 2 2 02/23/2021 05:26 AM    MG 2 0 12/22/2015 04:26 AM    PHOS 2 8 02/06/2021 05:06 AM    AST 74 (H) 02/24/2021 06:36 AM    AST 50 (H) 12/24/2015 06:52 AM    ALT 36 02/24/2021 06:36 AM    ALT 34 12/24/2015 06:52 AM    ALKPHOS 61 02/24/2021 06:36 AM    ALKPHOS 67 12/24/2015 06:52 AM    PROT 6 1 (L) 12/24/2015 06:52 AM    BILITOT 0 70 12/24/2015 06:52 AM    EGFR 42 02/24/2021 06:36 AM       Current Medications     Current Outpatient Medications:     acetaminophen (TYLENOL) 325 mg tablet, Take 2 tablets (650 mg total) by mouth every 6 (six) hours as needed for mild pain, Disp: , Rfl: 0    amiodarone 200 mg tablet, Take 1 tablet (200 mg total) by mouth daily with breakfast, Disp: 30 tablet, Rfl: 8    amLODIPine (NORVASC) 5 mg tablet, Take 1 tablet (5 mg total) by mouth daily, Disp: 90 tablet, Rfl: 3    atorvastatin (LIPITOR) 20 mg tablet, Take 1 tablet (20 mg total) by mouth daily with dinner, Disp: 30 tablet, Rfl: 8    bumetanide (BUMEX) 2 mg tablet, Take 1 tablet (2 mg total) by mouth daily, Disp: 30 tablet, Rfl: 8    levETIRAcetam (KEPPRA) 500 mg tablet, Take 1 tablet (500 mg total) by mouth every 12 (twelve) hours, Disp: 60 tablet, Rfl: 3    magnesium oxide (MAG-OX) 400 mg, Take 1 tablet (400 mg total) by mouth 2 (two) times a day before lunch and dinner, Disp: , Rfl: 0    metoprolol succinate (TOPROL-XL) 25 mg 24 hr tablet, Take 1 tablet (25 mg total) by mouth daily, Disp: 30 tablet, Rfl: 8    multivitamin (THERAGRAN) TABS, Take 1 tablet by mouth daily  , Disp: , Rfl:     nitroglycerin (NITROSTAT) 0 4 mg SL tablet, Place 1 tablet (0 4 mg total) under the tongue every 5 (five) minutes as needed for chest pain, Disp: 25 tablet, Rfl: 0    pantoprazole (PROTONIX) 40 mg tablet, Take 1 tablet (40 mg total) by mouth daily in the early morning, Disp: 30 tablet, Rfl: 8    potassium chloride (K-DUR,KLOR-CON) 20 mEq tablet, Take 2 tablets (40 mEq total) by mouth daily, Disp: 60 tablet, Rfl: 8    prasugrel (EFFIENT) tablet, Take 1 tablet (10 mg total) by mouth daily, Disp: 90 tablet, Rfl: 3    psyllium (METAMUCIL) packet, Take 1 packet by mouth daily, Disp: , Rfl: 0    rivaroxaban (XARELTO) 15 mg tablet, Take 1 tablet (15 mg total) by mouth daily with dinner, Disp: 30 tablet, Rfl: 8    sertraline (ZOLOFT) 25 mg tablet, Take 1 tablet (25 mg total) by mouth daily, Disp: 30 tablet, Rfl: 2    Health Maintenance     Health Maintenance   Topic Date Due    Depression Remission PHQ  Never done    DTaP,Tdap,and Td Vaccines (1 - Tdap) Never done   [employer] Annual Wellness Visit (AWV)  07/02/2020    COVID-19 Vaccine (2 - Moderna 2-dose series) 04/27/2021    Fall Risk  12/16/2021    BMI: Adult  04/19/2022    Pneumococcal Vaccine: 65+ Years  Completed    Influenza Vaccine  Completed    HIB Vaccine  Aged Out    Hepatitis B Vaccine  Aged Out    IPV Vaccine  Aged Out    Hepatitis A Vaccine  Aged Out    Meningococcal ACWY Vaccine  Aged Out    HPV Vaccine  Aged Out     Immunization History   Administered Date(s) Administered    Influenza Split High Dose Preservative Free IM 10/20/2014, 10/26/2016    Influenza, high dose seasonal 0 7 mL 01/04/2019, 01/27/2020, 10/22/2020    Pneumococcal Conjugate 13-Valent 06/21/2017    Pneumococcal Polysaccharide PPV23 12/07/2006    SARS-CoV-2 / COVID-19 mRNA IM (Moderna) 03/30/2021         5/3/2021 1:13 PM

## 2021-05-10 NOTE — PROGRESS NOTES
[address]  [phone]  Social Work    LCSW supervised and participated in today's visit along with Master's level intern, Gaby  I have reviewed and agree with her documentation for today's office visit with Stefany Craig  Social/safety needs were reviewed; LCSW to remain available as needed

## 2021-05-11 ENCOUNTER — IN-CLINIC DEVICE VISIT (OUTPATIENT)
Dept: CARDIOLOGY CLINIC | Facility: MEDICAL CENTER | Age: 81
End: 2021-05-11
Payer: MEDICARE

## 2021-05-11 DIAGNOSIS — Z95.0 PRESENCE OF PERMANENT CARDIAC PACEMAKER: Primary | ICD-10-CM

## 2021-05-11 PROCEDURE — 93280 PM DEVICE PROGR EVAL DUAL: CPT | Performed by: INTERNAL MEDICINE

## 2021-05-11 NOTE — PROGRESS NOTES
MDT DUAL PPM / ACTIVE SYSTEM IS MRI CONDITIONAL   DEVICE INTERROGATED IN THE [place] OFFICE:  BATTERY VOLTAGE ADEQUATE (10 8 YR)    AP 93 4%  100% (>40%/DDDR 70 PPM, AVD 50-80 MS, HIS)   ALL LEAD PARAMETERS WITHIN NORMAL LIMITS   NO SIGNIFICANT HIGH RATE EPISODES   NO PROGRAMMING CHANGES MADE TO DEVICE PARAMETERS   NORMAL DEVICE FUNCTION   RG

## 2021-05-13 ENCOUNTER — PATIENT OUTREACH (OUTPATIENT)
Dept: FAMILY MEDICINE CLINIC | Facility: MEDICAL CENTER | Age: 81
End: 2021-05-13

## 2021-05-13 NOTE — PROGRESS NOTES
Spoke with patient, she reports she is "doing well"  Aware that this is final call of bundled episode

## 2021-05-18 ENCOUNTER — OFFICE VISIT (OUTPATIENT)
Dept: GERIATRICS | Age: 81
End: 2021-05-18

## 2021-05-18 DIAGNOSIS — R41.3 MEMORY LOSS: Primary | ICD-10-CM

## 2021-05-18 NOTE — PROGRESS NOTES
Patient was here for comprehensive mindstream but walked out in the middle of testing   Patient did not complete mindstream

## 2021-06-11 ENCOUNTER — TELEPHONE (OUTPATIENT)
Dept: GERIATRICS | Age: 81
End: 2021-06-11

## 2021-06-11 NOTE — TELEPHONE ENCOUNTER
LCSW spoke with Madai Garrett by phone  Staff had attempted to confirm this appointment by phone earlier, contacted home phone number and Janina Rincon picked up  She inquired about what the appointment was for and appeared confused  [facility] [place] , who reports he feels Janina Rincon may not be willing to attend  LCSW contacted primary number; Janina Rincon answered the phone  LCSW contacted Leander's cell number and he requested that I speak directly with Janina Rincon  LCSW asked that Madai Garrett be present to hear the conversation  While speaking with Janina Rincon, she reports that we can cancel this appointment because she walked out of the Mindstream test  LCSW did note that Dr Souza Back would still likely want to review the results of the assessment with her  Janina Rincon was then agreeable  She asked Madai Garrett what he thinks and he noted he would attend with her  Janina Rincon ultimately agreed and confirmed the address

## 2021-06-14 ENCOUNTER — OFFICE VISIT (OUTPATIENT)
Dept: GERIATRICS | Age: 81
End: 2021-06-14
Payer: MEDICARE

## 2021-06-14 VITALS
HEART RATE: 86 BPM | HEIGHT: 59 IN | DIASTOLIC BLOOD PRESSURE: 58 MMHG | SYSTOLIC BLOOD PRESSURE: 122 MMHG | OXYGEN SATURATION: 94 % | TEMPERATURE: 98.2 F | WEIGHT: 133.6 LBS | BODY MASS INDEX: 26.93 KG/M2

## 2021-06-14 DIAGNOSIS — E78.5 HYPERLIPIDEMIA, UNSPECIFIED HYPERLIPIDEMIA TYPE: ICD-10-CM

## 2021-06-14 DIAGNOSIS — I10 ESSENTIAL HYPERTENSION: Chronic | ICD-10-CM

## 2021-06-14 DIAGNOSIS — R60.0 EDEMA OF LEFT UPPER EXTREMITY: ICD-10-CM

## 2021-06-14 DIAGNOSIS — I50.43 ACUTE ON CHRONIC COMBINED SYSTOLIC AND DIASTOLIC CONGESTIVE HEART FAILURE (HCC): ICD-10-CM

## 2021-06-14 DIAGNOSIS — F41.8 DEPRESSION WITH ANXIETY: ICD-10-CM

## 2021-06-14 DIAGNOSIS — I25.810 CORONARY ARTERY DISEASE INVOLVING OTHER CORONARY ARTERY BYPASS GRAFT WITHOUT ANGINA PECTORIS: ICD-10-CM

## 2021-06-14 DIAGNOSIS — I48.0 PAROXYSMAL ATRIAL FIBRILLATION (HCC): ICD-10-CM

## 2021-06-14 DIAGNOSIS — G31.84 MILD COGNITIVE IMPAIRMENT: Primary | ICD-10-CM

## 2021-06-14 DIAGNOSIS — F32.A DEPRESSION, UNSPECIFIED DEPRESSION TYPE: ICD-10-CM

## 2021-06-14 DIAGNOSIS — R56.9 SEIZURE-LIKE ACTIVITY (HCC): ICD-10-CM

## 2021-06-14 PROCEDURE — 99215 OFFICE O/P EST HI 40 MIN: CPT | Performed by: INTERNAL MEDICINE

## 2021-06-14 RX ORDER — SERTRALINE HYDROCHLORIDE 25 MG/1
TABLET, FILM COATED ORAL
Qty: 30 TABLET | Refills: 2 | Status: SHIPPED | OUTPATIENT
Start: 2021-06-14 | End: 2021-10-01

## 2021-06-14 NOTE — PATIENT INSTRUCTIONS
1  Continue to stay active physically, mentally and socially  2  Follow up with specialist and PCP periodically for management of your chronic conditions and preventative testing  3  Consider wearing fall alert daily  4  Contact PCP prior to starting Over the counter medications  Avoid OTC medications that can affect cognition including Benadryl, Tyelnol PM, etc    5  Consider brain stimulating activities including crossword puzzles, word search, reading or even learning a new hobby  6  Maintain a well balanced diet  Incorporate lots of vegetables and fruits in your diet  7   Follow up in 6 month  If symptoms worsen or new symptoms arise, please inform provider    Things that we know are helpful for thinking and memory   1  Exercise program: gradually increase your physical activity over time  Start small and be patient  Aerobic (cardio) activity is best but incorporate balance, strength and flexibility training as well    a  Try to get at least 30min 3 times per week   2  Diet: Mediterranean diet (colorful fruits and vegetables, olive oil, fish, whole grains, very little red meet is any), MIND diet, anti-inflammatory diet  a  Stay well hydrated: drink 6-8 glasses of water per day   b  What's good for your heart is good for your brain  c  Avoid high-salt foods   3  Sleep: aim for at least 7-8 hours per night   a  Avoid alcohol and medications like Benadryl (Tylenol PM) or other sedating drugs  4  Stress management/mindfulness practice:   a  Talk with our social workers about finding a cognitive behavioral therapists  b  Try a smart phone [place] like Zopim or mindfulness for beginners   i  Try curable for pain    c  Take a course in Mindfulness Based Stress Reduction (MBSR)   i  Zack nice  Read or listen to an audiobook about it:  i  Mindfulness for beginners   ii  10% happier  iii  The happiness advantage   5   Social engagement:   a  Stay in touch with family and friends b  Plan a few specific activities for your social health every week   c  [place] local support group   d  Volunteer! MIND diet score:  1 point for each component      · Green leafy vegetables: at least 6 per week  · Other vegetable: at least 1 per day   · Berries: at least 2 per week  · Red meat: fewer than 4 per week   · Fish: at least 1 per week   · Poultry: at least 2 per week  · Beans: at least 3 per week  · Nuts: at least 5 per week  · Fast or fried food: less than 1 per week  · Olive oil   · Butter less: less than 1 table-spoon per day   · Cheese: less than 1 serving per week   · Pastries/sweets: less than 5 servings per week  · Alcohol: no more than 1 serving/ day

## 2021-06-14 NOTE — PROGRESS NOTES
[address]  [phone]  Care Conference    LCSW participated in care conference today and provided folder of resources (below) along with a copy of today's care plan  General Information  - 10 Ways to Love Your Brain  - Memory loss ladder  - Alzheimer's Association information on Mild Cognitive Impairment diagnosis    Caregiver Support  - Flyer for [address] Virtual Caregiver Support Group (meeting monthly by [employer]M Study Course)    Safety  - Fall prevention / home safety checklist  - [employer] personal alert devices brochure Soheila Gordon notes no current need for a fall alert device as she is never alone and does not report having falls; LCSW encouraged consideration of this in the future as a precaution)    [address] (senior/community center involvement recommended, however, Collette Zambrano and Bobby Malik report they are comfortable currently with the socialization they maintain with friends and are not interested in a senior center)     Other  - My Plate for Older Adults    Discussion held surrounding importance of oversight for medications due to Verito's cognitive deficits  Collette Zambrano reports that she handles her medications at this time and does not have any concerns  LCSW, along with Dr Primitivo Virk and Dr Catrachito Chandra, recommended that Collette Zambrano and Bobby Malik review their medications together in order to ensure compliance and safety  Noted that Collette Zambrano will still manage her own medications, with assistance/oversight by Bobby Malik  LCSW to remain available as needed

## 2021-06-14 NOTE — PROGRESS NOTES
[facility]  Follow-up  [address]  [phone]    NAME: Nancy Gordon  AGE: 79 y o  SEX: female    DATE OF ENCOUNTER: 6/14/2021    Assessment and Plan     FAMILY PRESENT:   Magaly Johnson (spouse)   STAFF PRESENT:   Theodore Barraza MD, César Fernandez MD, Nevin Soto LCSW   Patient and Family Goals of Care:  Review cognitive decline and associated symptoms, discuss treatment options and care planning  Medical Concerns- Current/Historical:  Essential hypertension, coronary artery disease, seizure-like activity, hyperlipidemia, depression, paroxysmal atrial fibrillation, sick sinus syndrome         Geriatric Syndromes   Mild cognitive impairment  FINDINGS:  Neuropsychological   Mild cognitive impairment  o Mook Cognitive Assessment: 19/30  o Mindstream: Not completed   Depression Screen  o Geriatric Depression Scale: 2/15    Recommendations/Interventions  o Remain active physically, mentally and socially*  o Pharmaceutical and non-pharmaceutical interventions discussed  Patient and family would like to continue non-pharmaceutical interventions at this time     o Utilize reorientation and redirection as needed (dependent on situation)  o Participate in cognitively challenging exercises such as crosswords and puzzles  o Maintain good control of all chronic medical conditions with close follow-up with PCP and specialist  o Follow up in six months  Diagnostic Studies   Review of bloodwork and CT head (showing mild microangiopathic changes)     Physical Findings Impacting Function   Fall Risk Assessment:  Low (Timed Up and Go Test: 10 seconds)    Activities of Daily Living:  Independent   Instrumental Activities of Daily Living:  Somewhat Dependent    Recommendations/Interventions  o Encourage appropriate footwear at all times  o Review fall risk prevention tips and adjust within the home environment as needed     Medications Reviewed  Recommendations/Interventions  o Medications seem appropriate for present conditions  o Check with PCP before using over the counter (OTC) medications  o Avoid OTC medications that can affect cognition (e g , Benadryl, Tylenol PM)  o Avoid NSAIDs due to risk of GI bleed and renal impairment    Other Findings   BMI 26 62 kg/m2  o Maintain well-balanced diet; follow with PCP and specialist regularly   Essential hypertension  o Continue checking blood pressures at home; if BP continues to be elevated, follow up with PCP/specialist (goal /90)  o Continue Toprol 25 mg daily and amlodipine 5 mg once daily   Coronary artery disease  o Continue Pasgruel once daily, Lipitor 20 mg once daily, nitrostat 0 4mg as needed   Seizure-like activity  o Follow up with Neurology as scheduled  o Recommended talking to neurology about stopping Keppra   Hyperlipidemia  o Continue Lipitor   Depression  o Continue Zoloft 25 mg once daily   Paroxysmal atrial fibrillation  o Continue Toprol 25 mg once daily for rate control (goal rate of <100), Xarelto 15 mg daily for stroke prophylaxis   Sick sinus syndrome  o Encourage routine device monitoring and follow up    Recommended Health Maintenance   Immunizations, if not contraindicated:  Yearly Influenza vaccine, Pneumococcal vaccine every 5 years (65 years and over), Shingles vaccine, COVID-19 vaccine    Social/Safety Concerns   Fall Risk   Socialization   Assistance/Supervision   Medication Management    Recommendations/Interventions  o Recommend use of fall alert device as a safety precaution  o Consider senior/community center involvement for positive socialization, physical and cognitively challenging activities  o Consider assistance for medication administration such as blister packaging or use of a pillbox with oversight due to cognitive deficits   However, patient respectfully declined at this time   Conner Small Stress/Concern    o Consider Alzheimers Association caregiver support group  o Educational information provided    All questions were answered  Counseling time: 60 minutes    - Counseling Documentation: patient was counseled regarding: instructions for management, risk factor reductions, patient and family education and risks and benefits of treatment options    Chief Complaint     Family Conference    History of Present Illness     I had the pleasure of evaluating  Qi Vega who is a 79 y o  female  in Geriatric follow-up today  Since our last follow-up she has not had any falls, has not had any hospitalizations and has not had medication changes or major life events  She is accompanied by her  today  She reports that she is doing well  She denies any sleep disturbances  She was unable to complete the mindstream because she felt that the test was a "waste of her time " She also reported being upset about today's follow up since she thinks her short term memory loss is "normal for her age " Both patient and  reports that they stopped the [address] since she has not had any seizures  They have follow up with Neurologist in two weeks  The following portions of the patient's history were reviewed and updated as appropriate: allergies, current medications, past family history, past medical history, past social history, past surgical history and problem list       Review of Systems     Review of Systems   Constitutional: Negative for activity change, appetite change, fatigue and fever  HENT: Negative for congestion, ear discharge, rhinorrhea and sore throat  Eyes: Negative for pain and discharge  Respiratory: Negative for cough, chest tightness and shortness of breath  Cardiovascular: Negative for chest pain, palpitations and leg swelling  Gastrointestinal: Negative for abdominal distention, abdominal pain, constipation, diarrhea, nausea and vomiting     Genitourinary: Negative for difficulty urinating, dysuria and frequency  Musculoskeletal: Negative for arthralgias, back pain and myalgias  Skin: Negative for rash  Neurological: Negative for dizziness, weakness, numbness and headaches  Psychiatric/Behavioral: Negative for suicidal ideas  Active Problem List     Patient Active Problem List   Diagnosis    Essential hypertension    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)    A-fib (HCC)    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HCC)    [facility])    Abnormal liver enzymes    Edema of left upper extremity    Urinary retention    Seizure-like activity ([address] )    Hyperlipidemia    Toxic metabolic encephalopathy    UTI (urinary tract infection)    Depression    Current use of long term anticoagulation    Long term current use of amiodarone    Renal artery stenosis (HCC)    Pulmonary hypertension ([address] )    Sick [facility] syndrome [facility]    Hx of CABG    CAD (coronary artery disease)    Memory loss       Objective     /58 (BP Location: Left arm, Patient Position: Sitting, Cuff Size: Adult)   Pulse 86   Temp 98 2 °F (36 8 °C) (Temporal)   Ht 4' 11 25" (1 505 m)   Wt 60 6 kg (133 lb 9 6 oz)   SpO2 94%   BMI 26 75 kg/m²     Physical Exam  Constitutional:       General: She is not in acute distress  Appearance: She is well-developed  She is not diaphoretic  HENT:      Head: Normocephalic and atraumatic  Nose: Nose normal    Eyes:      General:         Right eye: No discharge  Left eye: No discharge  Conjunctiva/sclera: Conjunctivae normal       Pupils: Pupils are equal, round, and reactive to light  Cardiovascular:      Rate and Rhythm: Normal rate and regular rhythm  Heart sounds: Normal heart sounds  No murmur heard  Pulmonary:      Effort: Pulmonary effort is normal  No respiratory distress  Breath sounds: Normal breath sounds     Abdominal:      General: Bowel sounds are normal  Palpations: Abdomen is soft  Musculoskeletal:         General: No tenderness  Cervical back: Normal range of motion and neck supple  Right lower leg: No edema  Left lower leg: No edema  Skin:     General: Skin is warm  Neurological:      Mental Status: She is alert     Psychiatric:      Comments: Visibly frustrated          Pertinent Laboratory/Diagnostic Studies:  Most Recent Lab Work:  Lab Results   Component Value Date/Time    SODIUM 138 02/24/2021 06:36 AM    K 4 0 02/24/2021 06:36 AM    K 3 6 12/24/2015 06:52 AM    BUN 15 02/24/2021 06:36 AM    BUN 9 12/24/2015 06:52 AM    CREATININE 1 22 02/24/2021 06:36 AM    CREATININE 0 77 12/24/2015 06:52 AM    GLUC 94 02/24/2021 06:36 AM     Lab Results   Component Value Date/Time    AST 74 (H) 02/24/2021 06:36 AM    AST 50 (H) 12/24/2015 06:52 AM    ALT 36 02/24/2021 06:36 AM    ALT 34 12/24/2015 06:52 AM    ALB 3 2 (L) 02/24/2021 06:36 AM    ALB 3 3 (L) 12/24/2015 06:52 AM     Lab Results   Component Value Date/Time    HGB 11 9 02/24/2021 06:36 AM    HGB 12 1 12/24/2015 06:52 AM    WBC 6 18 02/24/2021 06:36 AM    WBC 4 91 12/24/2015 06:52 AM     02/24/2021 06:36 AM     12/24/2015 06:52 AM    INR 2 89 (H) 01/29/2021 11:56 AM    INR 0 99 12/21/2015 01:23 AM    PTT 56 (H) 01/29/2021 11:56 AM    PTT 24 12/21/2015 01:23 AM           Current Medications     Current Outpatient Medications:     acetaminophen (TYLENOL) 325 mg tablet, Take 2 tablets (650 mg total) by mouth every 6 (six) hours as needed for mild pain, Disp: , Rfl: 0    amiodarone 200 mg tablet, Take 1 tablet (200 mg total) by mouth daily with breakfast, Disp: 30 tablet, Rfl: 8    amLODIPine (NORVASC) 5 mg tablet, Take 1 tablet (5 mg total) by mouth daily, Disp: 90 tablet, Rfl: 3    atorvastatin (LIPITOR) 20 mg tablet, Take 1 tablet (20 mg total) by mouth daily with dinner, Disp: 30 tablet, Rfl: 8    bumetanide (BUMEX) 2 mg tablet, Take 1 tablet (2 mg total) by mouth daily, Disp: 30 tablet, Rfl: 8    levETIRAcetam (KEPPRA) 500 mg tablet, Take 1 tablet (500 mg total) by mouth every 12 (twelve) hours, Disp: 60 tablet, Rfl: 3    magnesium oxide (MAG-OX) 400 mg, Take 1 tablet (400 mg total) by mouth 2 (two) times a day before lunch and dinner, Disp: , Rfl: 0    metoprolol succinate (TOPROL-XL) 25 mg 24 hr tablet, Take 1 tablet (25 mg total) by mouth daily, Disp: 30 tablet, Rfl: 8    multivitamin (THERAGRAN) TABS, Take 1 tablet by mouth daily  , Disp: , Rfl:     nitroglycerin (NITROSTAT) 0 4 mg SL tablet, Place 1 tablet (0 4 mg total) under the tongue every 5 (five) minutes as needed for chest pain, Disp: 25 tablet, Rfl: 0    pantoprazole (PROTONIX) 40 mg tablet, Take 1 tablet (40 mg total) by mouth daily in the early morning, Disp: 30 tablet, Rfl: 8    potassium chloride (K-DUR,KLOR-CON) 20 mEq tablet, Take 2 tablets (40 mEq total) by mouth daily, Disp: 60 tablet, Rfl: 8    prasugrel (EFFIENT) tablet, Take 1 tablet (10 mg total) by mouth daily, Disp: 90 tablet, Rfl: 3    psyllium (METAMUCIL) packet, Take 1 packet by mouth daily, Disp: , Rfl: 0    rivaroxaban (XARELTO) 15 mg tablet, Take 1 tablet (15 mg total) by mouth daily with dinner, Disp: 30 tablet, Rfl: 8    sertraline (ZOLOFT) 25 mg tablet, Take 1 tablet (25 mg total) by mouth daily, Disp: 30 tablet, Rfl: 2

## 2021-07-01 ENCOUNTER — OFFICE VISIT (OUTPATIENT)
Dept: FAMILY MEDICINE CLINIC | Facility: MEDICAL CENTER | Age: 81
End: 2021-07-01
Payer: MEDICARE

## 2021-07-01 VITALS
OXYGEN SATURATION: 98 % | BODY MASS INDEX: 26.53 KG/M2 | WEIGHT: 131.6 LBS | TEMPERATURE: 98.9 F | HEIGHT: 59 IN | DIASTOLIC BLOOD PRESSURE: 62 MMHG | HEART RATE: 86 BPM | SYSTOLIC BLOOD PRESSURE: 134 MMHG

## 2021-07-01 DIAGNOSIS — M79.642 HAND PAIN, LEFT: ICD-10-CM

## 2021-07-01 DIAGNOSIS — R41.89 COGNITIVE CHANGES: ICD-10-CM

## 2021-07-01 DIAGNOSIS — I10 ESSENTIAL HYPERTENSION: ICD-10-CM

## 2021-07-01 DIAGNOSIS — R21 RASH: Primary | ICD-10-CM

## 2021-07-01 DIAGNOSIS — H90.3 SENSORINEURAL HEARING LOSS (SNHL) OF BOTH EARS: ICD-10-CM

## 2021-07-01 PROCEDURE — 99214 OFFICE O/P EST MOD 30 MIN: CPT | Performed by: FAMILY MEDICINE

## 2021-07-01 RX ORDER — TRIAMCINOLONE ACETONIDE 1 MG/G
CREAM TOPICAL 2 TIMES DAILY
Qty: 30 G | Refills: 0 | Status: SHIPPED | OUTPATIENT
Start: 2021-07-01 | End: 2021-10-20

## 2021-07-01 NOTE — PROGRESS NOTES
Rashmi Contreras is here for followup  Accompanied by her   Wilmer complains of itching and rash over her body  Started a few days ago  No pain  No known supplements dietary changes or medication changes  She has had shingles in the past     She saw Geriatrics for her evaluation of cognitive changes  See consult  No medication was advised  Six-month follow-up was advised  She stopped her Keppra  and did not follow-up with Neurology  She wants to return to driving  There have been no seizures  She still complains of numbness in her left hand especially at night  Carpal tunnel wrist splint was advised at the last visit  They did obtain 1 but  says  He does not think Renteria Minus and really use it and they can no longer find it  O: /62 (BP Location: Left arm, Patient Position: Sitting, Cuff Size: Adult)   Pulse 86   Temp 98 9 °F (37 2 °C)   Ht 4' 11" (1 499 m)   Wt 59 7 kg (131 lb 9 6 oz)   SpO2 98%   BMI 26 58 kg/m²      Neck no adenopathy   chest is clear with good breath sounds   Cardiac regular rate rhythm  Skin  Papular rashes noted over the right side of her neck and posterior to the right shoulder blade  No linear pattern  No vesicles noted  Assessment  1  Cognitive decline-See  geriatric consult  Her short-term memory is quite poor  2  Seizure disorder-  Based on episode of confusion in the hospital and abnormal EEG in January  I have strongly   The patient not to stop the Keppra until she consult Neurology  I have also advised her not to drive  3  Pruritic rash   4  Carpal tunnel syndrome left upper extremity- again discussed use of wrist splint  Consider Ortho referral     Plan     Rx for triamcinolone cream  Neurology referral   As above  Recheck 4 months

## 2021-07-02 ENCOUNTER — TELEPHONE (OUTPATIENT)
Dept: FAMILY MEDICINE CLINIC | Facility: MEDICAL CENTER | Age: 81
End: 2021-07-02

## 2021-07-02 NOTE — TELEPHONE ENCOUNTER
----- Message from Dionne Joe MD sent at 7/2/2021 12:24 AM EDT -----   Will need to refer Meghan Alas and for seizure disorder    Please find out which neurologists are managing seizures  and let me know

## 2021-07-02 NOTE — TELEPHONE ENCOUNTER
Per neurology office  Providers treating seizures are Dr Katerina Hamilton, Dr Kuldip Allen, and Dr Jaylyn Frye  Any preference?

## 2021-07-19 ENCOUNTER — TELEPHONE (OUTPATIENT)
Dept: FAMILY MEDICINE CLINIC | Facility: MEDICAL CENTER | Age: 81
End: 2021-07-19

## 2021-07-19 ENCOUNTER — OFFICE VISIT (OUTPATIENT)
Dept: NEUROLOGY | Facility: CLINIC | Age: 81
End: 2021-07-19
Payer: MEDICARE

## 2021-07-19 VITALS
SYSTOLIC BLOOD PRESSURE: 138 MMHG | BODY MASS INDEX: 26.61 KG/M2 | HEIGHT: 59 IN | DIASTOLIC BLOOD PRESSURE: 70 MMHG | HEART RATE: 77 BPM | WEIGHT: 132 LBS

## 2021-07-19 DIAGNOSIS — I48.0 PAROXYSMAL ATRIAL FIBRILLATION (HCC): ICD-10-CM

## 2021-07-19 DIAGNOSIS — I10 ESSENTIAL HYPERTENSION: Chronic | ICD-10-CM

## 2021-07-19 DIAGNOSIS — Z95.1 HX OF CABG: ICD-10-CM

## 2021-07-19 DIAGNOSIS — R56.9 SEIZURE-LIKE ACTIVITY (HCC): Primary | ICD-10-CM

## 2021-07-19 DIAGNOSIS — E78.5 HYPERLIPIDEMIA, UNSPECIFIED HYPERLIPIDEMIA TYPE: ICD-10-CM

## 2021-07-19 PROCEDURE — 99214 OFFICE O/P EST MOD 30 MIN: CPT | Performed by: PSYCHIATRY & NEUROLOGY

## 2021-07-19 NOTE — PROGRESS NOTES
Libby Sandra is a 79 y o  female  Chief Complaint   Patient presents with    Seizure like activity       Assessment:  1  Seizure-like activity ([address] )    2  Essential hypertension    3  Paroxysmal atrial fibrillation (HCC)    4  Hyperlipidemia, unspecified hyperlipidemia type    5  Hx of CABG        Plan:   continue with Keppra 500 mg twice a day  follow-up in 6 months  Discussion:  Patient was advised to continue with Keppra 500 mg twice a day, her last Keppra level was therapeutic at 24 2, she will continue Keppra for now as she is not experiencing any side effects and maybe at the next visit we can consider tapering it down to 250 mg twice a day, she should be eligible to go back to driving as it has been more than 6 months of advised her driving restrictions as per DMV and if she has a DMV form should bring it for us to fill it out she does have some mild cognitive impairment she is not willing to have a complete [place] at this visit and would try to persuade her to get it at the next visit she was advised to continue with mentally stimulating exercises and to take safety precautions and continue with seizure precautions and avoid seizure triggers to keep her blood pressure cholesterol and sugar under control to go to the hospital if has any worsening symptoms and call me otherwise to see me back in 6 months and follow up with the other physicians      Subjective:    HPI      79-year-old female with past medical history consisting of hypertension coronary artery disease status post stents for heart failure with preserved ejection fraction hyperlipidemia in follow-up for seizure like activity, she is on Xarelto for history of atrial fibrillation, since her last visit she has been doing good she has not had any more episodes according to the  he is not sure if she truly had a seizure also she feels her memory is good,  She is able to do her ADLs,, she is able to tell me the month year date president able to follow 3 step commands her immediate recall is 3/3 and delayed recall was 2/3 patient was not willing to do a complete [place],no other complaints  Vitals:    07/19/21 1124   BP: 138/70   BP Location: Left arm   Patient Position: Sitting   Cuff Size: Adult   Pulse: 77   Weight: 59 9 kg (132 lb)   Height: 4' 11" (1 499 m)       Current Medications    Current Outpatient Medications:     amiodarone 200 mg tablet, Take 1 tablet (200 mg total) by mouth daily with breakfast, Disp: 30 tablet, Rfl: 8    amLODIPine (NORVASC) 5 mg tablet, Take 1 tablet (5 mg total) by mouth daily, Disp: 90 tablet, Rfl: 3    atorvastatin (LIPITOR) 20 mg tablet, Take 1 tablet (20 mg total) by mouth daily with dinner, Disp: 30 tablet, Rfl: 8    bumetanide (BUMEX) 2 mg tablet, Take 1 tablet (2 mg total) by mouth daily, Disp: 30 tablet, Rfl: 8    levETIRAcetam (KEPPRA) 500 mg tablet, Take 1 tablet (500 mg total) by mouth every 12 (twelve) hours, Disp: 60 tablet, Rfl: 3    magnesium oxide (MAG-OX) 400 mg, Take 1 tablet (400 mg total) by mouth 2 (two) times a day before lunch and dinner, Disp: , Rfl: 0    metoprolol succinate (TOPROL-XL) 25 mg 24 hr tablet, Take 1 tablet (25 mg total) by mouth daily, Disp: 30 tablet, Rfl: 8    multivitamin (THERAGRAN) TABS, Take 1 tablet by mouth daily  , Disp: , Rfl:     nitroglycerin (NITROSTAT) 0 4 mg SL tablet, Place 1 tablet (0 4 mg total) under the tongue every 5 (five) minutes as needed for chest pain, Disp: 25 tablet, Rfl: 0    pantoprazole (PROTONIX) 40 mg tablet, Take 1 tablet (40 mg total) by mouth daily in the early morning, Disp: 30 tablet, Rfl: 8    potassium chloride (K-DUR,KLOR-CON) 20 mEq tablet, Take 2 tablets (40 mEq total) by mouth daily, Disp: 60 tablet, Rfl: 8    prasugrel (EFFIENT) tablet, Take 1 tablet (10 mg total) by mouth daily, Disp: 90 tablet, Rfl: 3    rivaroxaban (XARELTO) 15 mg tablet, Take 1 tablet (15 mg total) by mouth daily with dinner, Disp: 30 tablet, Rfl: 8   sertraline (ZOLOFT) 25 mg tablet, TAKE 1 TABLET BY MOUTH EVERY DAY, Disp: 30 tablet, Rfl: 2    triamcinolone (KENALOG) 0 1 % cream, Apply topically 2 (two) times a day, Disp: 30 g, Rfl: 0    acetaminophen (TYLENOL) 325 mg tablet, Take 2 tablets (650 mg total) by mouth every 6 (six) hours as needed for mild pain (Patient not taking: Reported on 7/1/2021), Disp: , Rfl: 0    psyllium (METAMUCIL) packet, Take 1 packet by mouth daily (Patient not taking: Reported on 7/19/2021), Disp: , Rfl: 0      Allergies  Sulfa antibiotics, Formaldehyde, and Codeine    Past Medical History  Past Medical History:   Diagnosis Date    Anal fissure     Cardiac disorder     Cognitive changes 12/23/2020    Esophageal reflux     Esophagitis, reflux     Hemorrhoids     Hepatic hemangioma     Last Assessed: 1/13/2015    Herpes zoster     History of colonic polyps     Hypertension     [facility] colitis [facility]     Lumbar herniated disc     Malignant neoplasm without specification of site ([facility]     Nephrolithiasis     L   Lithotripsy    Nontoxic single thyroid nodule     Last Assessed: 1/13/2015    Osteoarthritis     Overactive bladder     Raynaud disease     Respiratory system disease     Sjogren's disease ([facility]     Spinal stenosis     PONCHO (stress urinary incontinence, female)     Uterovaginal prolapse     Grade I-II         Past Surgical History:  Past Surgical History:   Procedure Laterality Date    APPENDECTOMY  1947    CARDIAC PACEMAKER PLACEMENT  01/2021    CARDIAC SURGERY      CABG    CATARACT EXTRACTION Bilateral     COLONOSCOPY  2012    Fiberoptic    COLONOSCOPY      Resolved: 2006 - 2012 5 year f/u    CORONARY ANGIOPLASTY WITH STENT PLACEMENT      CORONARY ARTERY BYPASS GRAFT      Resolved: 2012    ESOPHAGOGASTRODUODENOSCOPY  2012    Diagnostic    HEMORROIDECTOMY      KNEE SURGERY      LITHOTRIPSY      Renal    MALIGNANT SKIN LESION EXCISION      Face; Resolved: 2004    VT ESOPHAGOGASTRODUODENOSCOPY TRANSORAL DIAGNOSTIC N/A 4/13/2016    Procedure: EGD AND COLONOSCOPY;  Surgeon: Benny Tavarez MD;  [facility]; Service: Gastroenterology    RENAL ARTERY STENT      SKIN LESION EXCISION      Scalp    SOFT TISSUE TUMOR RESECTION      Shoulder; Resolved: 1995    THROMBOLYSIS      Postoperative Thrombolysis PTCA    TONSILLECTOMY      Resolved: 1944         Family History:  Family History   Problem Relation Age of Onset    Heart disease Mother     Hypertension Mother     Osteoporosis Mother     Heart disease Father     Hypertension Father     Ulcerative colitis Family     Colon polyps Family     No Known Problems Sister     Heart disease Brother     Diabetes Brother     No Known Problems Maternal Grandmother     No Known Problems Maternal Grandfather     No Known Problems Paternal Grandmother     No Known Problems Paternal Grandfather     No Known Problems Son     No Known Problems Daughter        Social History:   reports that she has never smoked  She has never used smokeless tobacco  She reports previous alcohol use of about 1 0 standard drinks of alcohol per week  She reports that she does not use drugs  I have reviewed the past medical history, surgical history, social and family history, current medications, allergies vitals, review of systems, and updated this information as appropriate today  Objective:    Physical Exam    Neurological Exam    GENERAL:  Cooperative in no acute distress  Well-developed and well-nourished    HEAD and NECK   Head is atraumatic normocephalic with no lesions or masses  Neck is supple with full range of motion    CARDIOVASCULAR  Carotid Arteries-no carotid bruits  NEUROLOGIC:  Mental Status-the patient is awake alert and oriented without aphasia or apraxia  Cranial Nerves: Visual fields are full to confrontation  Extraocular movements are full without nystagmus  Pupils are 2-1/2 mm and reactive  Face is symmetrical to light touch   Movements of facial expression move symmetrically  Hearing is normal to finger rub bilaterally  Soft palate lifts symmetrically  Shoulder shrug is symmetrical  Tongue is midline without atrophy  Motor: No drift is noted on arm extension  Strength is full in the upper and lower extremities with normal bulk and tone  Coordination: Finger to nose testing is performed accurately  Gait reveals a normal base with symmetrical arm swing  ROS:  Review of Systems   Constitutional: Negative  Negative for appetite change and fever  HENT: Negative  Negative for hearing loss, tinnitus, trouble swallowing and voice change  Eyes: Negative  Negative for photophobia and pain  Respiratory: Negative  Negative for shortness of breath  Cardiovascular: Negative  Negative for palpitations  Gastrointestinal: Negative  Negative for nausea and vomiting  Endocrine: Negative  Negative for cold intolerance  Genitourinary: Negative  Negative for dysuria, frequency and urgency  Musculoskeletal: Negative  Negative for myalgias and neck pain  Skin: Negative  Negative for rash  Neurological: Negative  Negative for dizziness, tremors, seizures, syncope, facial asymmetry, speech difficulty, weakness, light-headedness, numbness and headaches  Hematological: Negative  Does not bruise/bleed easily  Psychiatric/Behavioral: Negative  Negative for confusion, hallucinations and sleep disturbance

## 2021-07-19 NOTE — TELEPHONE ENCOUNTER
Sundar Morgan called, he had Wale Mimes to see the neurologist today and neuro said she was cleared to drive  Sundar Morgan was wondering if you can fill it out since they didn't realize that neuro would be clearing her today so they didn't bring the form  I did explain that the neuro may have to fill out the form

## 2021-07-27 ENCOUNTER — OFFICE VISIT (OUTPATIENT)
Dept: OBGYN CLINIC | Facility: MEDICAL CENTER | Age: 81
End: 2021-07-27
Payer: MEDICARE

## 2021-07-27 VITALS — WEIGHT: 132 LBS | BODY MASS INDEX: 26.66 KG/M2 | SYSTOLIC BLOOD PRESSURE: 134 MMHG | DIASTOLIC BLOOD PRESSURE: 70 MMHG

## 2021-07-27 DIAGNOSIS — N81.4 UTEROVAGINAL PROLAPSE: Primary | ICD-10-CM

## 2021-07-27 PROCEDURE — 99213 OFFICE O/P EST LOW 20 MIN: CPT | Performed by: STUDENT IN AN ORGANIZED HEALTH CARE EDUCATION/TRAINING PROGRAM

## 2021-07-27 NOTE — PROGRESS NOTES
Assessment/Plan:     Problem List Items Addressed This Visit    Visit Diagnoses     Uterovaginal prolapse    -  Primary  Doing well w/o pessary  Does not desire replacement  Breast exam wnl today  Declines mammo  F/u for annual in 1 year  Subjective:     Patient ID: Manuela Amin is a 79 y o  female  80 yo F presents to follow up pelvic organ prolapse  She requested to have pessary removed at last visit and monitor symptoms  On presentation today Marce Le reports no discomfort from prolapse  No issues with urinating or emptying her bladder  No issues with bowel movements  No pain or bleeding  Review of Systems   All other systems reviewed and are negative  Objective:     Physical Exam  Vitals reviewed  Exam conducted with a chaperone present  Pulmonary:      Effort: Pulmonary effort is normal    Chest:      Breasts:         Right: No inverted nipple, mass, nipple discharge, skin change or tenderness  Left: No inverted nipple, mass, nipple discharge, skin change or tenderness  Genitourinary:     Labia:         Right: No rash  Left: No rash  Cervix: No cervical motion tenderness, discharge or friability  Uterus: Not deviated, not enlarged, not fixed, not tender and no uterine prolapse  Adnexa:         Right: No mass, tenderness or fullness  Left: No mass, tenderness or fullness  Comments: + uterine prolapse + atrophy  Neurological:      Mental Status: She is alert and oriented to person, place, and time     Psychiatric:         Behavior: Behavior normal

## 2021-08-03 ENCOUNTER — OFFICE VISIT (OUTPATIENT)
Dept: FAMILY MEDICINE CLINIC | Facility: MEDICAL CENTER | Age: 81
End: 2021-08-03
Payer: MEDICARE

## 2021-08-03 VITALS
OXYGEN SATURATION: 98 % | SYSTOLIC BLOOD PRESSURE: 130 MMHG | HEART RATE: 78 BPM | BODY MASS INDEX: 26.73 KG/M2 | DIASTOLIC BLOOD PRESSURE: 78 MMHG | TEMPERATURE: 98.3 F | HEIGHT: 59 IN | WEIGHT: 132.6 LBS

## 2021-08-03 DIAGNOSIS — L30.9 ECZEMA, UNSPECIFIED TYPE: Primary | ICD-10-CM

## 2021-08-03 PROCEDURE — 99213 OFFICE O/P EST LOW 20 MIN: CPT | Performed by: FAMILY MEDICINE

## 2021-08-03 RX ORDER — CLOBETASOL PROPIONATE 0.5 MG/G
CREAM TOPICAL 2 TIMES DAILY
Qty: 30 G | Refills: 0 | Status: SHIPPED | OUTPATIENT
Start: 2021-08-03 | End: 2021-10-20

## 2021-08-04 NOTE — PROGRESS NOTES
Rubina De Luna complains of a rash  She was seen early in July with a very faint nonspecific maculopapular rash  She was given a prescription for triamcinolone cream   It does not appear that this was use consistently  She said it has gotten worse and it is mostly on her upper back  She does have a history of eczema  It is very itchy  She asks again about getting approval to get her 's license back  She has seen Neurology Dr Brandon Paredes  who has told her that he would approve this and that they need to bring him the [facility] form  O: /78 (BP Location: Left arm, Patient Position: Sitting, Cuff Size: Adult)   Pulse 78   Temp 98 3 °F (36 8 °C)   Ht 4' 11" (1 499 m)   Wt 60 1 kg (132 lb 9 6 oz)   SpO2 98%   BMI 26 78 kg/m²      Upper back over scapular area  show large area of light red erythematous slightly raised rash  Faintly  Scaly  Assessment    Eczematous like rash     plan   Rx for clobetasol cream   Should use  Eucerin moisturizer twice daily  Call if no better  Addendum    reviewed neurology consult with patient and her   They will get the [facility] form to Neurology

## 2021-08-09 ENCOUNTER — TELEPHONE (OUTPATIENT)
Dept: FAMILY MEDICINE CLINIC | Facility: MEDICAL CENTER | Age: 81
End: 2021-08-09

## 2021-08-09 NOTE — TELEPHONE ENCOUNTER
Pt's  was here for an appt and spoke to Dr Concha Valenzuela about forms for his wife to resume driving  Forms are in you inbox  He will pick them up when done  Call Tressa Bush at [phone]

## 2021-08-11 NOTE — TELEPHONE ENCOUNTER
Left msg at home with wife for Galileo Encinas to call, and on [employer] cell phone  When he calls back give to Mitchell Leong

## 2021-08-11 NOTE — TELEPHONE ENCOUNTER
Need to remind  that I have told them several times this form needs to be signed by her neurologist Gerson Rincon   They will need to bring the form to his office as he advised in his consult  I reviewed this consult with them at her last visit

## 2021-08-17 ENCOUNTER — REMOTE DEVICE CLINIC VISIT (OUTPATIENT)
Dept: CARDIOLOGY CLINIC | Facility: CLINIC | Age: 81
End: 2021-08-17
Payer: MEDICARE

## 2021-08-17 DIAGNOSIS — Z95.0 CARDIAC PACEMAKER IN SITU: Primary | ICD-10-CM

## 2021-08-17 PROCEDURE — 93296 REM INTERROG EVL PM/IDS: CPT | Performed by: INTERNAL MEDICINE

## 2021-08-17 PROCEDURE — 93294 REM INTERROG EVL PM/LDLS PM: CPT | Performed by: INTERNAL MEDICINE

## 2021-08-18 NOTE — PROGRESS NOTES
Results for orders placed or performed in visit on 08/17/21   Cardiac EP device report    Narrative    MDT DUAL PPM / ACTIVE SYSTEM IS MRI CONDITIONAL  CARELINK TRANSMISSION: BATTERY VOLTAGE ADEQUATE (9 8 YRS)  AP-96%, >99% (>40% [email or web address] OFF)  ALL AVAILABLE LEAD PARAMETERS WITHIN NORMAL LIMITS  NO SIGNIFICANT HIGH RATE EPISODES  NORMAL DEVICE FUNCTION   GV

## 2021-09-23 ENCOUNTER — TELEPHONE (OUTPATIENT)
Dept: FAMILY MEDICINE CLINIC | Facility: MEDICAL CENTER | Age: 81
End: 2021-09-23

## 2021-09-23 DIAGNOSIS — R21 RASH: Primary | ICD-10-CM

## 2021-09-23 NOTE — TELEPHONE ENCOUNTER
Pt's  called to say that the pt's rash is spreading  She has run out of the cream a few days ago  Do you want to send more cream to [facility]? Refer to derm? Please, advise next step

## 2021-09-28 ENCOUNTER — TELEPHONE (OUTPATIENT)
Dept: GERIATRICS | Age: 81
End: 2021-09-28

## 2021-09-28 NOTE — TELEPHONE ENCOUNTER
Romel Weinstein called to seek sooner appointment, Ángela Mendoza has a 6 month Follow up in December  He says Ángela Mendoza is aware of her short term memory loss and it is upsetting to her  He says they were told of some medications that may be possible to help her  I assured him we have no sooner appts, and that this message would be sent to the Doctor  Please advise how you would like to move forward- short of having an opening to move them into before December

## 2021-10-01 DIAGNOSIS — F41.8 DEPRESSION WITH ANXIETY: ICD-10-CM

## 2021-10-01 RX ORDER — SERTRALINE HYDROCHLORIDE 25 MG/1
TABLET, FILM COATED ORAL
Qty: 30 TABLET | Refills: 2 | Status: SHIPPED | OUTPATIENT
Start: 2021-10-01 | End: 2022-06-16

## 2021-10-05 ENCOUNTER — TELEPHONE (OUTPATIENT)
Dept: NEUROLOGY | Facility: CLINIC | Age: 81
End: 2021-10-05

## 2021-10-08 ENCOUNTER — IMMUNIZATIONS (OUTPATIENT)
Dept: FAMILY MEDICINE CLINIC | Facility: MEDICAL CENTER | Age: 81
End: 2021-10-08
Payer: MEDICARE

## 2021-10-08 DIAGNOSIS — Z23 ENCOUNTER FOR IMMUNIZATION: Primary | ICD-10-CM

## 2021-10-08 PROCEDURE — G0008 ADMIN INFLUENZA VIRUS VAC: HCPCS

## 2021-10-08 PROCEDURE — 90662 IIV NO PRSV INCREASED AG IM: CPT

## 2021-10-18 ENCOUNTER — TELEPHONE (OUTPATIENT)
Dept: GERIATRICS | Age: 81
End: 2021-10-18

## 2021-10-18 ENCOUNTER — OFFICE VISIT (OUTPATIENT)
Dept: GERIATRICS | Age: 81
End: 2021-10-18
Payer: MEDICARE

## 2021-10-18 VITALS
WEIGHT: 143.4 LBS | BODY MASS INDEX: 28.91 KG/M2 | RESPIRATION RATE: 16 BRPM | DIASTOLIC BLOOD PRESSURE: 84 MMHG | SYSTOLIC BLOOD PRESSURE: 154 MMHG | TEMPERATURE: 96.8 F | HEIGHT: 59 IN

## 2021-10-18 DIAGNOSIS — I25.810 CORONARY ARTERY DISEASE INVOLVING OTHER CORONARY ARTERY BYPASS GRAFT WITHOUT ANGINA PECTORIS: ICD-10-CM

## 2021-10-18 DIAGNOSIS — E78.5 HYPERLIPIDEMIA, UNSPECIFIED HYPERLIPIDEMIA TYPE: ICD-10-CM

## 2021-10-18 DIAGNOSIS — I49.5 SICK SINUS SYNDROME (HCC): ICD-10-CM

## 2021-10-18 DIAGNOSIS — I48.0 PAROXYSMAL ATRIAL FIBRILLATION (HCC): ICD-10-CM

## 2021-10-18 DIAGNOSIS — G31.84 MILD COGNITIVE IMPAIRMENT: Primary | ICD-10-CM

## 2021-10-18 DIAGNOSIS — I10 ESSENTIAL HYPERTENSION: Chronic | ICD-10-CM

## 2021-10-18 DIAGNOSIS — F32.A DEPRESSION, UNSPECIFIED DEPRESSION TYPE: ICD-10-CM

## 2021-10-18 PROCEDURE — 99214 OFFICE O/P EST MOD 30 MIN: CPT | Performed by: INTERNAL MEDICINE

## 2021-10-20 ENCOUNTER — OFFICE VISIT (OUTPATIENT)
Dept: FAMILY MEDICINE CLINIC | Facility: MEDICAL CENTER | Age: 81
End: 2021-10-20
Payer: MEDICARE

## 2021-10-20 ENCOUNTER — TELEPHONE (OUTPATIENT)
Dept: NEUROLOGY | Facility: CLINIC | Age: 81
End: 2021-10-20

## 2021-10-20 ENCOUNTER — APPOINTMENT (OUTPATIENT)
Dept: RADIOLOGY | Facility: MEDICAL CENTER | Age: 81
End: 2021-10-20
Payer: MEDICARE

## 2021-10-20 VITALS
HEART RATE: 80 BPM | SYSTOLIC BLOOD PRESSURE: 124 MMHG | HEIGHT: 59 IN | DIASTOLIC BLOOD PRESSURE: 84 MMHG | BODY MASS INDEX: 28.43 KG/M2 | WEIGHT: 141 LBS | TEMPERATURE: 98.9 F

## 2021-10-20 DIAGNOSIS — M54.2 NECK PAIN: Primary | ICD-10-CM

## 2021-10-20 DIAGNOSIS — M54.2 NECK PAIN: ICD-10-CM

## 2021-10-20 PROCEDURE — 72050 X-RAY EXAM NECK SPINE 4/5VWS: CPT

## 2021-10-20 PROCEDURE — 99213 OFFICE O/P EST LOW 20 MIN: CPT | Performed by: FAMILY MEDICINE

## 2021-11-09 ENCOUNTER — TELEPHONE (OUTPATIENT)
Dept: FAMILY MEDICINE CLINIC | Facility: MEDICAL CENTER | Age: 81
End: 2021-11-09

## 2021-11-09 DIAGNOSIS — M54.2 NECK PAIN: Primary | ICD-10-CM

## 2021-11-10 ENCOUNTER — TELEPHONE (OUTPATIENT)
Dept: FAMILY MEDICINE CLINIC | Facility: MEDICAL CENTER | Age: 81
End: 2021-11-10

## 2021-11-10 ENCOUNTER — EVALUATION (OUTPATIENT)
Dept: PHYSICAL THERAPY | Facility: MEDICAL CENTER | Age: 81
End: 2021-11-10
Payer: MEDICARE

## 2021-11-10 DIAGNOSIS — M54.2 NECK PAIN: ICD-10-CM

## 2021-11-10 PROCEDURE — 97161 PT EVAL LOW COMPLEX 20 MIN: CPT | Performed by: PHYSICAL THERAPIST

## 2021-11-15 ENCOUNTER — OFFICE VISIT (OUTPATIENT)
Dept: PHYSICAL THERAPY | Facility: MEDICAL CENTER | Age: 81
End: 2021-11-15
Payer: MEDICARE

## 2021-11-15 DIAGNOSIS — M54.2 NECK PAIN: Primary | ICD-10-CM

## 2021-11-15 PROCEDURE — 97110 THERAPEUTIC EXERCISES: CPT | Performed by: PHYSICAL THERAPIST

## 2021-11-15 PROCEDURE — 97140 MANUAL THERAPY 1/> REGIONS: CPT | Performed by: PHYSICAL THERAPIST

## 2021-11-17 ENCOUNTER — OFFICE VISIT (OUTPATIENT)
Dept: PHYSICAL THERAPY | Facility: MEDICAL CENTER | Age: 81
End: 2021-11-17
Payer: MEDICARE

## 2021-11-17 DIAGNOSIS — M54.2 NECK PAIN: Primary | ICD-10-CM

## 2021-11-17 PROCEDURE — 97140 MANUAL THERAPY 1/> REGIONS: CPT | Performed by: PHYSICAL THERAPIST

## 2021-11-17 PROCEDURE — 97112 NEUROMUSCULAR REEDUCATION: CPT | Performed by: PHYSICAL THERAPIST

## 2021-11-17 PROCEDURE — 97110 THERAPEUTIC EXERCISES: CPT | Performed by: PHYSICAL THERAPIST

## 2021-11-18 ENCOUNTER — REMOTE DEVICE CLINIC VISIT (OUTPATIENT)
Dept: CARDIOLOGY CLINIC | Facility: CLINIC | Age: 81
End: 2021-11-18
Payer: MEDICARE

## 2021-11-18 DIAGNOSIS — Z95.0 PACEMAKER: Primary | ICD-10-CM

## 2021-11-18 PROCEDURE — 93296 REM INTERROG EVL PM/IDS: CPT | Performed by: INTERNAL MEDICINE

## 2021-11-18 PROCEDURE — 93294 REM INTERROG EVL PM/LDLS PM: CPT | Performed by: INTERNAL MEDICINE

## 2021-11-22 ENCOUNTER — OFFICE VISIT (OUTPATIENT)
Dept: PHYSICAL THERAPY | Facility: MEDICAL CENTER | Age: 81
End: 2021-11-22
Payer: MEDICARE

## 2021-11-22 DIAGNOSIS — M54.2 NECK PAIN: Primary | ICD-10-CM

## 2021-11-22 PROCEDURE — 97140 MANUAL THERAPY 1/> REGIONS: CPT | Performed by: PHYSICAL THERAPIST

## 2021-11-22 PROCEDURE — 97110 THERAPEUTIC EXERCISES: CPT | Performed by: PHYSICAL THERAPIST

## 2021-11-24 ENCOUNTER — TELEPHONE (OUTPATIENT)
Dept: GERIATRICS | Age: 81
End: 2021-11-24

## 2021-11-24 ENCOUNTER — OFFICE VISIT (OUTPATIENT)
Dept: PHYSICAL THERAPY | Facility: MEDICAL CENTER | Age: 81
End: 2021-11-24
Payer: MEDICARE

## 2021-11-24 DIAGNOSIS — M54.2 NECK PAIN: Primary | ICD-10-CM

## 2021-11-24 PROCEDURE — 97110 THERAPEUTIC EXERCISES: CPT | Performed by: PHYSICAL THERAPIST

## 2021-11-24 PROCEDURE — 97140 MANUAL THERAPY 1/> REGIONS: CPT | Performed by: PHYSICAL THERAPIST

## 2021-11-29 ENCOUNTER — OFFICE VISIT (OUTPATIENT)
Dept: PHYSICAL THERAPY | Facility: MEDICAL CENTER | Age: 81
End: 2021-11-29
Payer: MEDICARE

## 2021-11-29 DIAGNOSIS — M54.2 NECK PAIN: Primary | ICD-10-CM

## 2021-11-29 PROCEDURE — 97140 MANUAL THERAPY 1/> REGIONS: CPT | Performed by: PHYSICAL THERAPIST

## 2021-11-29 PROCEDURE — 97110 THERAPEUTIC EXERCISES: CPT | Performed by: PHYSICAL THERAPIST

## 2021-12-01 ENCOUNTER — OFFICE VISIT (OUTPATIENT)
Dept: FAMILY MEDICINE CLINIC | Facility: MEDICAL CENTER | Age: 81
End: 2021-12-01
Payer: MEDICARE

## 2021-12-01 ENCOUNTER — TELEPHONE (OUTPATIENT)
Dept: FAMILY MEDICINE CLINIC | Facility: MEDICAL CENTER | Age: 81
End: 2021-12-01

## 2021-12-01 ENCOUNTER — OFFICE VISIT (OUTPATIENT)
Dept: PHYSICAL THERAPY | Facility: MEDICAL CENTER | Age: 81
End: 2021-12-01
Payer: MEDICARE

## 2021-12-01 VITALS
WEIGHT: 140.6 LBS | SYSTOLIC BLOOD PRESSURE: 128 MMHG | TEMPERATURE: 97.4 F | HEART RATE: 88 BPM | BODY MASS INDEX: 27.61 KG/M2 | HEIGHT: 60 IN | DIASTOLIC BLOOD PRESSURE: 80 MMHG | OXYGEN SATURATION: 96 %

## 2021-12-01 DIAGNOSIS — M54.2 NECK PAIN: Primary | ICD-10-CM

## 2021-12-01 DIAGNOSIS — R20.0 LEFT ARM NUMBNESS: ICD-10-CM

## 2021-12-01 PROCEDURE — 97530 THERAPEUTIC ACTIVITIES: CPT | Performed by: PHYSICAL THERAPIST

## 2021-12-01 PROCEDURE — 99213 OFFICE O/P EST LOW 20 MIN: CPT | Performed by: FAMILY MEDICINE

## 2021-12-01 PROCEDURE — 1124F ACP DISCUSS-NO DSCNMKR DOCD: CPT | Performed by: FAMILY MEDICINE

## 2021-12-02 ENCOUNTER — TELEPHONE (OUTPATIENT)
Dept: FAMILY MEDICINE CLINIC | Facility: MEDICAL CENTER | Age: 81
End: 2021-12-02

## 2021-12-06 ENCOUNTER — APPOINTMENT (OUTPATIENT)
Dept: PHYSICAL THERAPY | Facility: MEDICAL CENTER | Age: 81
End: 2021-12-06
Payer: MEDICARE

## 2021-12-08 ENCOUNTER — HOSPITAL ENCOUNTER (OUTPATIENT)
Dept: RADIOLOGY | Facility: MEDICAL CENTER | Age: 81
Discharge: HOME | End: 2021-12-08
Payer: MEDICARE

## 2021-12-08 ENCOUNTER — APPOINTMENT (OUTPATIENT)
Dept: PHYSICAL THERAPY | Facility: MEDICAL CENTER | Age: 81
End: 2021-12-08
Payer: MEDICARE

## 2021-12-08 DIAGNOSIS — I70.213 ATHEROSCLEROSIS OF NATIVE ARTERY OF BOTH LOWER EXTREMITIES WITH INTERMITTENT CLAUDICATION (HCC): ICD-10-CM

## 2021-12-08 PROCEDURE — 93925 LOWER EXTREMITY STUDY: CPT

## 2021-12-08 PROCEDURE — 93925 LOWER EXTREMITY STUDY: CPT | Performed by: SURGERY

## 2021-12-08 PROCEDURE — 93922 UPR/L XTREMITY ART 2 LEVELS: CPT | Performed by: SURGERY

## 2021-12-08 PROCEDURE — 93923 UPR/LXTR ART STDY 3+ LVLS: CPT

## 2021-12-14 ENCOUNTER — PROCEDURE VISIT (OUTPATIENT)
Dept: NEUROLOGY | Facility: CLINIC | Age: 81
End: 2021-12-14
Payer: MEDICARE

## 2021-12-14 DIAGNOSIS — M54.2 NECK PAIN: ICD-10-CM

## 2021-12-14 DIAGNOSIS — R20.0 LEFT ARM NUMBNESS: ICD-10-CM

## 2021-12-14 PROCEDURE — 95886 MUSC TEST DONE W/N TEST COMP: CPT | Performed by: PHYSICAL MEDICINE & REHABILITATION

## 2021-12-14 PROCEDURE — 95911 NRV CNDJ TEST 9-10 STUDIES: CPT | Performed by: PHYSICAL MEDICINE & REHABILITATION

## 2021-12-15 ENCOUNTER — TELEPHONE (OUTPATIENT)
Dept: FAMILY MEDICINE CLINIC | Facility: MEDICAL CENTER | Age: 81
End: 2021-12-15

## 2021-12-15 DIAGNOSIS — R20.0 NUMBNESS AND TINGLING OF LEFT UPPER EXTREMITY: ICD-10-CM

## 2021-12-15 DIAGNOSIS — R20.2 NUMBNESS AND TINGLING OF LEFT UPPER EXTREMITY: ICD-10-CM

## 2021-12-15 DIAGNOSIS — M54.2 CERVICAL PAIN: Primary | ICD-10-CM

## 2021-12-15 DIAGNOSIS — M79.602 PAIN OF LEFT UPPER EXTREMITY: ICD-10-CM

## 2021-12-16 ENCOUNTER — OFFICE VISIT (OUTPATIENT)
Dept: VASCULAR SURGERY | Facility: CLINIC | Age: 81
End: 2021-12-16
Payer: MEDICARE

## 2021-12-16 VITALS
DIASTOLIC BLOOD PRESSURE: 80 MMHG | TEMPERATURE: 98.4 F | HEART RATE: 86 BPM | SYSTOLIC BLOOD PRESSURE: 128 MMHG | HEIGHT: 60 IN | BODY MASS INDEX: 27.29 KG/M2 | WEIGHT: 139 LBS

## 2021-12-16 DIAGNOSIS — I65.23 CAROTID STENOSIS, ASYMPTOMATIC, BILATERAL: ICD-10-CM

## 2021-12-16 DIAGNOSIS — I70.219 ATHEROSCLEROSIS WITH CLAUDICATION OF EXTREMITY (HCC): Primary | ICD-10-CM

## 2021-12-16 PROCEDURE — 99214 OFFICE O/P EST MOD 30 MIN: CPT | Performed by: SURGERY

## 2021-12-16 RX ORDER — AMOXICILLIN 500 MG/1
CAPSULE ORAL
COMMUNITY
Start: 2021-12-16 | End: 2022-04-05 | Stop reason: ALTCHOICE

## 2021-12-28 ENCOUNTER — HOSPITAL ENCOUNTER (OUTPATIENT)
Dept: RADIOLOGY | Facility: HOSPITAL | Age: 81
Discharge: HOME | End: 2021-12-28
Payer: MEDICARE

## 2021-12-28 DIAGNOSIS — M79.602 PAIN OF LEFT UPPER EXTREMITY: ICD-10-CM

## 2021-12-28 DIAGNOSIS — R20.2 NUMBNESS AND TINGLING OF LEFT UPPER EXTREMITY: ICD-10-CM

## 2021-12-28 DIAGNOSIS — R20.0 NUMBNESS AND TINGLING OF LEFT UPPER EXTREMITY: ICD-10-CM

## 2021-12-28 DIAGNOSIS — M54.2 CERVICAL PAIN: ICD-10-CM

## 2021-12-28 PROCEDURE — G1004 CDSM NDSC: HCPCS

## 2021-12-28 PROCEDURE — 72141 MRI NECK SPINE W/O DYE: CPT

## 2022-01-03 ENCOUNTER — TELEPHONE (OUTPATIENT)
Dept: FAMILY MEDICINE CLINIC | Facility: MEDICAL CENTER | Age: 82
End: 2022-01-03

## 2022-01-03 DIAGNOSIS — M43.02 CERVICAL SPONDYLOLYSIS: Primary | ICD-10-CM

## 2022-01-03 NOTE — TELEPHONE ENCOUNTER
Pt aware, order put in for pain management referral, pt agreeable  Did not know you sent in an rx for her, she will  the robaxin today and let us know how she does

## 2022-01-03 NOTE — TELEPHONE ENCOUNTER
----- Message from Oswaldo Porras MD sent at 1/2/2022  9:27 PM EST -----  How is she doing with the Robaxin?

## 2022-01-03 NOTE — TELEPHONE ENCOUNTER
Pt's , Leander Bradley, called back  He said that they want to try Dr Saadia Kovacs, the chiropractor before they try pain management  They want to give him a few weeks  FYMARIN  He asked again about the pain med being sent  He stopped at [facility] and it wasn't there yet

## 2022-01-07 ENCOUNTER — RA CDI HCC (OUTPATIENT)
Dept: OTHER | Facility: HOSPITAL | Age: 82
End: 2022-01-07

## 2022-01-07 NOTE — PROGRESS NOTES
[facility] "[employer]  coding opportunities          Number of diagnosis code(s) already on the problem list added to FYI fla               Number of suggestions used: 1      Number of suggestions NOT actually used: 8     Patients insurance company: Medicare     Visit status: Patient arrived for their scheduled appointment        [facility] [address] [employer]  coding opportunities     Please review/recertify the BPA diagnoses for     Look at problem list     Number of diagnosis code(s) already on the problem list added to FYI fla                     Patients insurance company: [employer]

## 2022-01-13 ENCOUNTER — OFFICE VISIT (OUTPATIENT)
Dept: FAMILY MEDICINE CLINIC | Facility: MEDICAL CENTER | Age: 82
End: 2022-01-13
Payer: MEDICARE

## 2022-01-13 VITALS
TEMPERATURE: 99.1 F | BODY MASS INDEX: 27.52 KG/M2 | OXYGEN SATURATION: 94 % | HEART RATE: 90 BPM | WEIGHT: 140.2 LBS | HEIGHT: 60 IN | SYSTOLIC BLOOD PRESSURE: 134 MMHG | DIASTOLIC BLOOD PRESSURE: 84 MMHG

## 2022-01-13 DIAGNOSIS — M54.2 NECK PAIN: ICD-10-CM

## 2022-01-13 DIAGNOSIS — Z13.29 THYROID DISORDER SCREENING: ICD-10-CM

## 2022-01-13 DIAGNOSIS — K55.019 ACUTE (REVERSIBLE) ISCHEMIA OF SMALL INTESTINE, EXTENT UNSPECIFIED (HCC): ICD-10-CM

## 2022-01-13 DIAGNOSIS — E78.5 HYPERLIPIDEMIA, UNSPECIFIED HYPERLIPIDEMIA TYPE: ICD-10-CM

## 2022-01-13 DIAGNOSIS — I10 ESSENTIAL HYPERTENSION: Primary | ICD-10-CM

## 2022-01-13 PROCEDURE — 99213 OFFICE O/P EST LOW 20 MIN: CPT | Performed by: FAMILY MEDICINE

## 2022-01-13 NOTE — PROGRESS NOTES
Griselda Johnson is here for 6 month checkup  Griselda Johnson faizan COVID vaccine and booster  She is still complaining of significant neck pain  See notes last visit  Muscle relaxant was prescribed however patient never picked it up from the pharmacy  She still complains of a lot of pain  EMG showed both chronic C6 radiculopathy and bilateral median nerve compression consistent with carpal tunnel syndrome  She was referred to pain management   thinks her memory is getting worse  She sees neurology  O: /84 (BP Location: Left arm, Patient Position: Sitting, Cuff Size: Adult)   Pulse 90   Temp 99 1 °F (37 3 °C)   Ht 4' 11 5" (1 511 m)   Wt 63 6 kg (140 lb 3 2 oz)   SpO2 94%   BMI 27 84 kg/m²    Neck reduced range of motion; no adenopathy  Is clear with good breath sound  Cardiac regular rate rhythm without murmur    Assessment  1  Chronic neck pain-will send in another prescription for Robaxin and encouraged to give this a trial     Plan  As above  Follow-up with pain management

## 2022-01-14 PROBLEM — K55.019 ACUTE (REVERSIBLE) ISCHEMIA OF SMALL INTESTINE, EXTENT UNSPECIFIED (HCC): Status: ACTIVE | Noted: 2022-01-14

## 2022-01-14 RX ORDER — METHOCARBAMOL 500 MG/1
500 TABLET, FILM COATED ORAL 3 TIMES DAILY
Qty: 30 TABLET | Refills: 1 | Status: SHIPPED | OUTPATIENT
Start: 2022-01-14

## 2022-02-01 DIAGNOSIS — I48.91 A-FIB (HCC): ICD-10-CM

## 2022-02-01 DIAGNOSIS — I50.40 COMBINED CONGESTIVE SYSTOLIC AND DIASTOLIC HEART FAILURE (HCC): ICD-10-CM

## 2022-02-01 RX ORDER — BUMETANIDE 2 MG/1
TABLET ORAL
Qty: 30 TABLET | Refills: 8 | Status: SHIPPED | OUTPATIENT
Start: 2022-02-01

## 2022-02-01 RX ORDER — AMIODARONE HYDROCHLORIDE 200 MG/1
200 TABLET ORAL
Qty: 30 TABLET | Refills: 8 | Status: SHIPPED | OUTPATIENT
Start: 2022-02-01 | End: 2022-04-12 | Stop reason: SDUPTHER

## 2022-02-21 ENCOUNTER — REMOTE DEVICE CLINIC VISIT (OUTPATIENT)
Dept: CARDIOLOGY CLINIC | Facility: CLINIC | Age: 82
End: 2022-02-21
Payer: MEDICARE

## 2022-02-21 DIAGNOSIS — Z95.0 PRESENCE OF CARDIAC PACEMAKER: Primary | ICD-10-CM

## 2022-02-21 PROCEDURE — 93296 REM INTERROG EVL PM/IDS: CPT | Performed by: INTERNAL MEDICINE

## 2022-02-21 PROCEDURE — 93294 REM INTERROG EVL PM/LDLS PM: CPT | Performed by: INTERNAL MEDICINE

## 2022-02-21 NOTE — PROGRESS NOTES
Results for orders placed or performed in visit on 02/21/22   Cardiac EP device report    Narrative    MDT-DUAL CHAMBER PPM (DDDR MODE)/ACTIVE SYSTEM IS MRI CONDITIONAL  CARELINK TRANSMISSION: BATTERY VOLTAGE ADEQUATE (9 6 YRS)  AP: 94 9%  : 99 7% (>40%~MVP-OFF~DDDR/70)  ALL AVAILABLE LEAD PARAMETERS WITHIN NORMAL LIMITS  NO SIGNIFICANT HIGH RATE EPISODES  PACEMAKER FUNCTIONING APPROPRIATELY    [address] [place] [address]

## 2022-03-02 ENCOUNTER — CONSULT (OUTPATIENT)
Dept: PAIN MEDICINE | Facility: CLINIC | Age: 82
End: 2022-03-02
Payer: MEDICARE

## 2022-03-02 VITALS
HEART RATE: 73 BPM | BODY MASS INDEX: 27.8 KG/M2 | SYSTOLIC BLOOD PRESSURE: 155 MMHG | DIASTOLIC BLOOD PRESSURE: 68 MMHG | WEIGHT: 140 LBS

## 2022-03-02 DIAGNOSIS — M79.18 MYOFASCIAL PAIN SYNDROME: ICD-10-CM

## 2022-03-02 DIAGNOSIS — M48.02 CERVICAL SPINAL STENOSIS: ICD-10-CM

## 2022-03-02 DIAGNOSIS — M50.120 CERVICAL DISC DISORDER WITH RADICULOPATHY OF MID-CERVICAL REGION: Primary | ICD-10-CM

## 2022-03-02 PROCEDURE — 99204 OFFICE O/P NEW MOD 45 MIN: CPT | Performed by: ANESTHESIOLOGY

## 2022-03-02 RX ORDER — GABAPENTIN 100 MG/1
CAPSULE ORAL
Qty: 60 CAPSULE | Refills: 1 | Status: SHIPPED | OUTPATIENT
Start: 2022-03-02

## 2022-03-02 NOTE — PROGRESS NOTES
Assessment  1  Cervical disc disorder with radiculopathy of mid-cervical region    2  Cervical spinal stenosis    3  Myofascial pain syndrome      Plan  The patient's symptoms, history/physical are consistent with pain that is multifactorial in origin but predominantly result of underlying cervical spinal stenosis which is leading to left-sided radicular symptoms  At this time, I discussed performing cervical epidural steroid injection to help reduce swelling inflammation  We will get permission to have her Xarelto and Effient held by Dr Fredo Mayorga and if it is safe proceed we will scheduled for an upcoming Tuesday or Thursday under fluoroscopic guidance  In the interim, I will start on gabapentin 100 mg at bedtime to help with radicular symptoms  She was apprised of the most common side effects including sleepiness and dizziness  Complete risks and benefits including bleeding, infection, tissue reaction, nerve injury and allergic reaction were discussed  The approach was demonstrated using models and literature was provided  Verbal and written consent was obtained  My impressions and treatment recommendations were discussed in detail with the patient who verbalized understanding and had no further questions  Discharge instructions were provided  I personally saw and examined the patient and I agree with the above discussed plan of care  Orders Placed This Encounter   Procedures    FL spine and pain procedure     Standing Status:   Future     Standing Expiration Date:   3/2/2026     Order Specific Question:   Reason for Exam:     Answer:   BEVERLEY     Order Specific Question:   Anticoagulant hold needed? Answer:   Yes-Effient, Xarelto     New Medications Ordered This Visit   Medications    gabapentin (NEURONTIN) 100 mg capsule     Sig: Take 1 tablet at bedtime    May increase to 2 tablets after 3 days     Dispense:  60 capsule     Refill:  1       History of Present Illness    Mily Abel is a 79 y o  female referred by Dr Abdifatah Blil for neck and left-sided arm pain that has been present for 5 months  She denies any precipitating injury or trauma  Symptoms are moderate to severe rated 7-10/10 on numeric rating scale and felt nearly constantly  Pain is described to be sharp with numbness and paresthesias that radiates down the entire left arm  Symptoms are aggravated with head movements, lying down, bending  Treatment history has included physical therapy and chiropractic manipulation which have not been helpful  Heat/ice provides moderate relief  Of note, the patient is on Effient and Xarelto following an MI last year  I have personally reviewed and/or updated the patient's past medical history, past surgical history, family history, social history, current medications, allergies, and vital signs today  Review of Systems   Constitutional: Negative for fever and unexpected weight change  HENT: Negative for trouble swallowing  Eyes: Negative for visual disturbance  Respiratory: Negative for shortness of breath and wheezing  Cardiovascular: Negative for chest pain and palpitations  Gastrointestinal: Negative for constipation, diarrhea, nausea and vomiting  Endocrine: Negative for cold intolerance, heat intolerance and polydipsia  Genitourinary: Negative for difficulty urinating and frequency  Musculoskeletal: Positive for joint swelling and neck pain  Negative for arthralgias, gait problem and myalgias  Skin: Negative for rash  Neurological: Positive for numbness  Negative for dizziness, seizures, syncope and headaches  Hematological: Does not bruise/bleed easily  Psychiatric/Behavioral: Negative for dysphoric mood  All other systems reviewed and are negative        Patient Active Problem List   Diagnosis    Essential hypertension    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)    A-fib (HCC)    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[address] )    PAD (peripheral artery disease) ([address] )    Abnormal liver enzymes    Edema of left upper extremity    Urinary retention    Seizure-like activity (HCC)    Hyperlipidemia    Toxic metabolic encephalopathy    UTI (urinary tract infection)    Depression    Current use of long term anticoagulation    Long term current use of amiodarone    Renal artery stenosis (HCC)    Pulmonary hypertension (HCC)    Sick sinus syndrome (HCC)    Hx of CABG    CAD (coronary artery disease)    Memory loss    Mild cognitive impairment    Atherosclerosis with claudication of extremity (HCC)    Carotid stenosis, asymptomatic, bilateral    Acute (reversible) ischemia of small intestine, extent unspecified (HCC)       Past Medical History:   Diagnosis Date    Anal fissure     Cardiac disorder     Cognitive changes 12/23/2020    Esophageal reflux     Esophagitis, reflux     Hemorrhoids     Hepatic hemangioma     Last Assessed: 1/13/2015    Herpes zoster     History of colonic polyps     Hypertension     Ischemic colitis ([address] )     Lumbar herniated disc     Malignant neoplasm without specification of [facility]     Nephrolithiasis     L   Lithotripsy    Nontoxic single thyroid nodule     Last Assessed: 1/13/2015    Osteoarthritis     Overactive bladder     Raynaud disease     Respiratory system disease     Sjogren's disease ([facility]     Spinal stenosis     PONCHO (stress urinary incontinence, female)     Uterovaginal prolapse     Grade I-II       Past Surgical History:   Procedure Laterality Date    APPENDECTOMY  1947    CARDIAC PACEMAKER PLACEMENT  01/2021    CARDIAC SURGERY      CABG    CATARACT EXTRACTION Bilateral     COLONOSCOPY  2012    Fiberoptic    COLONOSCOPY      Resolved: 2006 - 2012 5 year f/u    CORONARY ANGIOPLASTY WITH STENT PLACEMENT      CORONARY ARTERY BYPASS GRAFT      Resolved: 2012    ESOPHAGOGASTRODUODENOSCOPY  2012    Diagnostic    HEMORROIDECTOMY      KNEE SURGERY      LITHOTRIPSY      Renal  MALIGNANT SKIN LESION EXCISION      Face; Resolved: 2004    CT ESOPHAGOGASTRODUODENOSCOPY TRANSORAL DIAGNOSTIC N/A 4/13/2016    Procedure: EGD AND COLONOSCOPY;  Surgeon: Rolando Edwards MD;  [facility];   Service: Gastroenterology    RENAL ARTERY STENT      SKIN LESION EXCISION      Scalp    SOFT TISSUE TUMOR RESECTION      Shoulder; Resolved: 1995    THROMBOLYSIS      Postoperative Thrombolysis PTCA    TONSILLECTOMY      Resolved: 1944       Family History   Problem Relation Age of Onset    Heart disease Mother     Hypertension Mother     Osteoporosis Mother     Heart disease Father     Hypertension Father     Ulcerative colitis Family     Colon polyps Family     No Known Problems Sister     Heart disease Brother     Diabetes Brother     No Known Problems Maternal Grandmother     No Known Problems Maternal Grandfather     No Known Problems Paternal Grandmother     No Known Problems Paternal Grandfather     No Known Problems Son     No Known Problems Daughter        Social History     Occupational History    Occupation: retired   Tobacco Use    Smoking status: Never Smoker    Smokeless tobacco: Never Used   Vaping Use    Vaping Use: Never used   Substance and Sexual Activity    Alcohol use: Not Currently     Alcohol/week: 1 0 standard drink     Types: 1 Glasses of wine per week    Drug use: Never    Sexual activity: Not Currently       Current Outpatient Medications on File Prior to Visit   Medication Sig    acetaminophen (TYLENOL) 325 mg tablet Take 2 tablets (650 mg total) by mouth every 6 (six) hours as needed for mild pain    amLODIPine (NORVASC) 5 mg tablet Take 1 tablet (5 mg total) by mouth daily    atorvastatin (LIPITOR) 20 mg tablet Take 1 tablet (20 mg total) by mouth daily with dinner    bumetanide (BUMEX) 2 mg tablet TAKE 1 TABLET BY MOUTH EVERY DAY    levETIRAcetam (KEPPRA) 500 mg tablet Take 1 tablet (500 mg total) by mouth every 12 (twelve) hours    magnesium oxide (MAG-OX) 400 mg Take 1 tablet (400 mg total) by mouth 2 (two) times a day before lunch and dinner    methocarbamol (ROBAXIN) 500 mg tablet Take 1 tablet (500 mg total) by mouth 3 (three) times a day    metoprolol succinate (TOPROL-XL) 25 mg 24 hr tablet Take 1 tablet (25 mg total) by mouth daily    multivitamin (THERAGRAN) TABS Take 1 tablet by mouth daily   nitroglycerin (NITROSTAT) 0 4 mg SL tablet Place 1 tablet (0 4 mg total) under the tongue every 5 (five) minutes as needed for chest pain    rivaroxaban (XARELTO) 15 mg tablet Take 1 tablet (15 mg total) by mouth daily with dinner    sertraline (ZOLOFT) 25 mg tablet TAKE 1 TABLET BY MOUTH EVERY DAY    amiodarone 200 mg tablet TAKE 1 TABLET (200 MG TOTAL) BY MOUTH DAILY WITH BREAKFAST    amoxicillin (AMOXIL) 500 mg capsule  (Patient not taking: Reported on 1/13/2022 )    potassium chloride (K-DUR,KLOR-CON) 20 mEq tablet Take 2 tablets (40 mEq total) by mouth daily (Patient not taking: Reported on 1/13/2022 )    prasugrel (EFFIENT) tablet Take 1 tablet (10 mg total) by mouth daily     No current facility-administered medications on file prior to visit  Allergies   Allergen Reactions    Sulfa Antibiotics Anaphylaxis    Formaldehyde     Codeine Palpitations       Physical Exam    /68   Pulse 73   Wt 63 5 kg (140 lb)   BMI 27 80 kg/m²     Constitutional: normal, well developed, well nourished, alert, in no distress and non-toxic and no overt pain behavior    Eyes: anicteric  HEENT: grossly intact  Neck: supple, symmetric, trachea midline and no masses   Pulmonary:even and unlabored  Cardiovascular:No edema or pitting edema present  Skin:Normal without rashes or lesions and well hydrated  Psychiatric:Mood and affect appropriate  Neurologic:Cranial Nerves II-XII grossly intact  Musculoskeletal:normal     Cervical Spine Exam  Appearance:  Normal lordosis  Palpation/Tenderness:  left cervical paraspinal tenderness  right cervical paraspinal tenderness  left trapezium tenderness  right trapezium tenderness  Range of Motion:  Flexion: Moderately limited  with pain  Extension:  Moderately limited  with pain  Rotation - Left:  Moderately limited  with pain  Rotation - Right:  Moderately limited  with pain  Motor Strength:  Left Arm Flexion  5/5  Left Arm Extension  5/5  Right Arm Flexion  5/5  Right Arm Extension  5/5  Left Wrist Flexion  5/5  Left Wrist Extension  5/5  Left Finger Abduction  5/5  Right Finger Abduction  5/5  Left Pincer Grasp  5/5  Right Pincer Grasp  5/5  Reflexes:  Left Biceps:  1+   Right Biceps:  1+   Left Triceps:  1+   Right Triceps:  1+     Imaging    MRI CERVICAL SPINE WITHOUT CONTRAST (12/28/2021)     INDICATION: M54 2: Cervicalgia  R20 0: Anesthesia of skin  R20 2: Paresthesia of skin  M79 602: Pain in left arm      COMPARISON:  None      TECHNIQUE:  Sagittal T1, sagittal T2, sagittal inversion recovery, axial T2, axial  2D merge     IMAGE QUALITY:  Diagnostic     FINDINGS:     ALIGNMENT:  Normal alignment of the cervical spine  No compression fracture  No subluxation  No scoliosis      MARROW SIGNAL:  Normal marrow signal is identified within the visualized bony structures  No discrete marrow lesion      CERVICAL AND VISUALIZED THORACIC CORD:  Normal signal within the visualized cord      PREVERTEBRAL AND PARASPINAL SOFT TISSUES:  Normal      VISUALIZED POSTERIOR FOSSA:  The visualized posterior fossa demonstrates no abnormal signal      CERVICAL DISC SPACES:     C2-C3:  No significant canal stenosis or foraminal narrowing      C3-C4:  Disc space narrowing  Disc osteophyte complex with uncovertebral spurring  Facet arthrosis  Mild to moderate right greater than left foraminal narrowing  No significant canal stenosis      C4-C5:  Disc space narrowing  Disc osteophyte complex with uncovertebral spurring  Mild canal stenosis    Mild bilateral foraminal narrowing      C5-C6:  Disc osteophyte complex with uncovertebral spurring     Facet arthrosis  Mild canal stenosis  Moderate left and mild right foraminal narrowing      C6-C7:  There is focal prominence of the posterior longitudinal ligament with mild mass effect on the thecal sac  There is no significant foraminal narrowing      C7-T1:  Focal prominence of the posterior longitudinal ligament with mild mass effect on thecal sac  No significant foraminal narrowing      UPPER THORACIC DISC SPACES:  Normal      IMPRESSION:     Multilevel cervical spondylosis, as described above      Most notable level is at C5-C6 where multifactorial disease results in overall mild canal stenosis    Moderate left foraminal narrowing is present at this level

## 2022-03-02 NOTE — PATIENT INSTRUCTIONS
Cervical Spinal Stenosis   AMBULATORY CARE:   Cervical spinal stenosis  is narrowing of the spinal canal in your neck  Your spinal canal holds your spinal cord  When your spinal canal narrows, it may put pressure on your spinal cord  Cervical spinal stenosis is a chronic (long-term) condition  Common signs and symptoms: You may have no signs or symptoms  If your spinal canal is very narrow, your signs and symptoms may be worse  You may have any of the following:  · Neck pain and headaches    · Burning pain that shoots from your shoulder down your arm    · Weakness, numbness, or tingling in your arm or hand, which may spread to your legs    · Trouble urinating or having a bowel movement    · Trouble with balance and walkingTrouble holding your neck up or trouble standing up straight    Seek care immediately:   · You injure your neck, and your pain and symptoms worsen  · You have new or increased trouble walking  · You cannot control when you urinate or have a bowel movement  · You have more numbness, tingling, or weakness than before  Contact your healthcare provider if:   · Your pain does not get better or gets worse, even after you take medicines  · You have questions or concerns about your condition or care  Treatment:   · NSAIDs , such as ibuprofen, help decrease swelling and pain  NSAIDs can cause stomach bleeding or kidney problems in certain people  If you take blood thinner medicine, always ask your healthcare provider if NSAIDs are safe for you  Always read the medicine label and follow directions  · Acetaminophen  decreases pain and fever  It is available without a doctor's order  Ask how much to take and how often to take it  Follow directions  Read the labels of all other medicines you are using to see if they also contain acetaminophen, or ask your doctor or pharmacist  Acetaminophen can cause liver damage if not taken correctly   Do not use more than 4 grams (4,000 milligrams) total of acetaminophen in one day  · Prescription pain medicine  may be given  Ask how to take this medicine safely  · Muscle relaxers  help decrease pain and muscle spasms  · A steroid injection  may be given to reduce inflammation  Steroid medicine is injected into the epidural space  The epidural space is between your spinal cord and vertebrae  · A nerve block  is an injection of numbing medicine  You may need a nerve block if your pain is not going away, or is getting worse  · Surgery  may be needed to widen your spinal canal or decrease pressure on your spinal cord  Surgery also may be done to fix damaged or injured vertebrae in your neck  Manage your symptoms:   · Go to physical and occupational therapy  as directed  A physical therapist teaches you exercises to help improve movement and strength, and to decrease pain  An occupational therapist teaches you skills to help with your daily activities  · Apply heat on your neck for 20 to 30 minutes every 2 hours for as many days as directed  Heat helps decrease pain and muscle spasms  · Apply ice  on your neck for 15 to 20 minutes every hour or as directed  Use an ice pack, or put crushed ice in a plastic bag  Cover it with a towel before you apply it to your skin  Ice helps prevent tissue damage and decreases swelling and pain  · Avoid certain activities  Some activities may worsen your condition or cause more injury  Do not ride motorcycles or horses, climb ladders, or play football or other contact sports  Ask your provider which activities are safe for you to do  Follow up with your healthcare provider as directed:  Write down your questions so you remember to ask them during your visits  © Copyright [employer] 2022 Information is for End User's use only and may not be sold, redistributed or otherwise used for commercial purposes   All illustrations and images included in CareNotes® are the copyrighted property of A D A M , Inc  or [facility] Abby Zaldivar   The above information is an  only  It is not intended as medical advice for individual conditions or treatments  Talk to your doctor, nurse or pharmacist before following any medical regimen to see if it is safe and effective for you

## 2022-03-03 ENCOUNTER — TELEPHONE (OUTPATIENT)
Dept: PAIN MEDICINE | Facility: CLINIC | Age: 82
End: 2022-03-03

## 2022-03-03 NOTE — TELEPHONE ENCOUNTER
This patient is being considered for a neuraxial injection for the management of their pain  However, the patient is currently on an anticoagulant per your orders  The American Society of [employer]est[employer] Guideline on neuraxial procedures and anti-coagulation indicates that medication should be held as follows: Effient 7 days prior to injection  Xarelto 3 days prior to injection  Please advise if you ok the hold of this medication    Thanks

## 2022-03-07 NOTE — TELEPHONE ENCOUNTER
Spoke to patient she does not recall discussing an injection with Dr Eddie Richardson and would like to think about it first

## 2022-03-08 NOTE — TELEPHONE ENCOUNTER
Gm I have Pop Charles ( spouse) called in regard to scheduling an appt for PT  Please be advised thank you    Pt can be reached @   [phone]

## 2022-03-16 ENCOUNTER — HOSPITAL ENCOUNTER (OUTPATIENT)
Dept: RADIOLOGY | Facility: CLINIC | Age: 82
Discharge: HOME | End: 2022-03-16
Attending: ANESTHESIOLOGY | Admitting: ANESTHESIOLOGY
Payer: MEDICARE

## 2022-03-16 VITALS
OXYGEN SATURATION: 92 % | DIASTOLIC BLOOD PRESSURE: 84 MMHG | RESPIRATION RATE: 18 BRPM | HEART RATE: 88 BPM | SYSTOLIC BLOOD PRESSURE: 165 MMHG | TEMPERATURE: 98 F

## 2022-03-16 DIAGNOSIS — M50.120 CERVICAL DISC DISORDER WITH RADICULOPATHY OF MID-CERVICAL REGION: ICD-10-CM

## 2022-03-16 PROCEDURE — 62321 NJX INTERLAMINAR CRV/THRC: CPT | Performed by: ANESTHESIOLOGY

## 2022-03-16 RX ORDER — METHYLPREDNISOLONE ACETATE 80 MG/ML
80 INJECTION, SUSPENSION INTRA-ARTICULAR; INTRALESIONAL; INTRAMUSCULAR; PARENTERAL; SOFT TISSUE ONCE
Status: COMPLETED | OUTPATIENT
Start: 2022-03-16 | End: 2022-03-16

## 2022-03-16 RX ADMIN — IOHEXOL 1 ML: 300 INJECTION, SOLUTION INTRAVENOUS at 13:15

## 2022-03-16 RX ADMIN — METHYLPREDNISOLONE ACETATE 80 MG: 80 INJECTION, SUSPENSION INTRA-ARTICULAR; INTRALESIONAL; INTRAMUSCULAR; PARENTERAL; SOFT TISSUE at 13:15

## 2022-03-16 NOTE — DISCHARGE INSTR - LAB
Epidural Steroid Injection   WHAT YOU NEED TO KNOW:   An epidural steroid injection (RADHA) is a procedure to inject steroid medicine into the epidural space  The epidural space is between your spinal cord and vertebrae  Steroids reduce inflammation and fluid buildup in your spine that may be causing pain  You may be given pain medicine along with the steroids  ACTIVITY  Do not drive or operate machinery today  No strenuous activity today - bending, lifting, etc   You may resume normal activites starting tomorrow - start slowly and as tolerated  You may shower today, but no tub baths or hot tubs  You may have numbness for several hours from the local anesthetic  Please use caution and common sense, especially with weight-bearing activities  CARE OF THE INJECTION SITE  If you have soreness or pain, apply ice to the area today (20 minutes on/20 minutes off)  Starting tomorrow, you may use warm, moist heat or ice if needed  You may have an increase or change in your discomfort for 36-48 hours after your treatment  Apply ice and continue with any pain medication you have been prescribed  Notify [facility] if you have any of the following: redness, drainage, swelling, headache, stiff neck or fever above 100°F     SPECIAL INSTRUCTIONS  Our office will contact you in approximately 7 days for a progress report  MEDICATIONS  Continue to take all routine medications  Our office may have instructed you to hold some medications  As no general anesthesia was used in today's procedure, you should not experience any side effects related to anesthesia  If you have a problem specifically related to your procedure, please call our office at [phone]  Problems not related to your procedure should be directed to your primary care physician

## 2022-03-16 NOTE — H&P
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[address] )    [facility]peripheral artery [facility]) [facility])    Abnormal liver enzymes    Edema of left upper extremity    Urinary retention    Seizure-like activity ([address] )    Hyperlipidemia    Toxic metabolic encephalopathy    UTI (urinary tract infection)    Depression    Current use of long term anticoagulation    Long term current use of amiodarone    Renal artery stenosis (HCC)    Pulmonary hypertension ([address] )    Sick sinus syndrome ([address] )    Hx of CABG    CAD (coronary artery disease)    Memory loss    Mild cognitive impairment    Atherosclerosis with claudication of extremity ([facility])    Carotid stenosis, asymptomatic, bilateral    Acute (reversible) ischemia of small intestine, extent unspecified ([facility]       Past Medical History:   Diagnosis Date    Anal fissure     Cardiac disorder     Cognitive changes 12/23/2020    Esophageal reflux     Esophagitis, reflux     Hemorrhoids     Hepatic hemangioma     Last Assessed: 1/13/2015    Herpes zoster     History of colonic polyps     Hypertension     Ischemic colitis ([address] )     Lumbar herniated disc     Malignant neoplasm without specification of site ([address] )     Nephrolithiasis     L   Lithotripsy    Nontoxic single thyroid nodule     Last Assessed: 1/13/2015    Osteoarthritis     Overactive bladder     Raynaud disease     Respiratory system disease     Sjogren's disease ([address] )     Spinal stenosis     PONCHO (stress urinary incontinence, female)     Uterovaginal prolapse     Grade I-II       Past Surgical History:   Procedure Laterality Date    APPENDECTOMY  1947    CARDIAC PACEMAKER PLACEMENT  01/2021   Jackson Vargas CARDIAC SURGERY      CABG    CATARACT EXTRACTION Bilateral     COLONOSCOPY  2012    Fiberoptic    COLONOSCOPY      Resolved: 2006 - 2012 5 year f/u    CORONARY ANGIOPLASTY WITH STENT PLACEMENT      CORONARY ARTERY BYPASS GRAFT      Resolved: 2012    ESOPHAGOGASTRODUODENOSCOPY  2012    Diagnostic    HEMORROIDECTOMY      KNEE SURGERY      LITHOTRIPSY      Renal    MALIGNANT SKIN LESION EXCISION      Face; Resolved: 2004    DC ESOPHAGOGASTRODUODENOSCOPY TRANSORAL DIAGNOSTIC N/A 4/13/2016    Procedure: EGD AND COLONOSCOPY;  Surgeon: Ann Hunt MD;  [facility]: [facility]; Service: Gastroenterology    RENAL ARTERY STENT      SKIN LESION EXCISION      Scalp    SOFT TISSUE TUMOR RESECTION      Shoulder; Resolved: 1995    THROMBOLYSIS      Postoperative Thrombolysis PTCA    TONSILLECTOMY      Resolved: 1944         Current Outpatient Medications:     acetaminophen (TYLENOL) 325 mg tablet, Take 2 tablets (650 mg total) by mouth every 6 (six) hours as needed for mild pain, Disp: , Rfl: 0    amiodarone 200 mg tablet, TAKE 1 TABLET (200 MG TOTAL) BY MOUTH DAILY WITH BREAKFAST, Disp: 30 tablet, Rfl: 8    amLODIPine (NORVASC) 5 mg tablet, Take 1 tablet (5 mg total) by mouth daily, Disp: 90 tablet, Rfl: 3    amoxicillin (AMOXIL) 500 mg capsule, , Disp: , Rfl:     atorvastatin (LIPITOR) 20 mg tablet, Take 1 tablet (20 mg total) by mouth daily with dinner, Disp: 30 tablet, Rfl: 8    bumetanide (BUMEX) 2 mg tablet, TAKE 1 TABLET BY MOUTH EVERY DAY, Disp: 30 tablet, Rfl: 8    gabapentin (NEURONTIN) 100 mg capsule, Take 1 tablet at bedtime    May increase to 2 tablets after 3 days, Disp: 60 capsule, Rfl: 1    levETIRAcetam (KEPPRA) 500 mg tablet, Take 1 tablet (500 mg total) by mouth every 12 (twelve) hours, Disp: 60 tablet, Rfl: 3    magnesium oxide (MAG-OX) 400 mg, Take 1 tablet (400 mg total) by mouth 2 (two) times a day before lunch and dinner, Disp: , Rfl: 0    methocarbamol (ROBAXIN) 500 mg tablet, Take 1 tablet (500 mg total) by mouth 3 (three) times a day, Disp: 30 tablet, Rfl: 1    metoprolol succinate (TOPROL-XL) 25 mg 24 hr tablet, Take 1 tablet (25 mg total) by mouth daily, Disp: 30 tablet, Rfl: 8    multivitamin (THERAGRAN) TABS, Take 1 tablet by mouth daily  , Disp: , Rfl:     nitroglycerin (NITROSTAT) 0 4 mg SL tablet, Place 1 tablet (0 4 mg total) under the tongue every 5 (five) minutes as needed for chest pain, Disp: 25 tablet, Rfl: 0    potassium chloride (K-DUR,KLOR-CON) 20 mEq tablet, Take 2 tablets (40 mEq total) by mouth daily (Patient not taking: Reported on 1/13/2022 ), Disp: 60 tablet, Rfl: 8    prasugrel (EFFIENT) tablet, Take 1 tablet (10 mg total) by mouth daily, Disp: 90 tablet, Rfl: 3    rivaroxaban (XARELTO) 15 mg tablet, Take 1 tablet (15 mg total) by mouth daily with dinner, Disp: 30 tablet, Rfl: 8    sertraline (ZOLOFT) 25 mg tablet, TAKE 1 TABLET BY MOUTH EVERY DAY, Disp: 30 tablet, Rfl: 2    Current Facility-Administered Medications:     iohexol (OMNIPAQUE) 300 mg/mL injection 1 mL, 1 mL, Epidural, Once, Muna Hurtado MD    methylPREDNISolone acetate (DEPO-MEDROL) injection 80 mg, 80 mg, Epidural, Once, Muna Hurtado MD    Allergies   Allergen Reactions    Sulfa Antibiotics Anaphylaxis    Formaldehyde     Codeine Palpitations       Physical Exam:   Vitals:    03/16/22 1300   BP: 150/79   Pulse: 79   Resp: 18   Temp: 98 °F (36 7 °C)   SpO2: 94%     General: Awake, Alert, Oriented x 3, Mood and affect appropriate  Respiratory: Respirations even and unlabored  Cardiovascular: Peripheral pulses intact; no edema  Musculoskeletal Exam:  Neck pain    ASA Score: 3    Patient/Chart Verification  Patient ID Verified: Verbal  ID Band Applied: No  Consents Confirmed: Procedural,To be obtained in the Pre-Procedure area  H&P( within 30 days) Verified: To be obtained in the Pre-Procedure area  Allergies Reviewed:  Yes  Anticoag/NSAID held?: Yes (xarelto held since 3/12 per pt)  Currently on antibiotics?: No    Assessment:   1   Cervical disc disorder with radiculopathy of mid-cervical region        Plan: [place]

## 2022-03-23 ENCOUNTER — TELEPHONE (OUTPATIENT)
Dept: PAIN MEDICINE | Facility: CLINIC | Age: 82
End: 2022-03-23

## 2022-03-23 NOTE — TELEPHONE ENCOUNTER
Pt reports some improvement post inj   Pain level 5/10   Pt aware I will call next week for an update

## 2022-03-26 DIAGNOSIS — I48.91 A-FIB (HCC): ICD-10-CM

## 2022-03-26 DIAGNOSIS — I25.10 CORONARY ARTERY DISEASE INVOLVING NATIVE HEART WITHOUT ANGINA PECTORIS, UNSPECIFIED VESSEL OR LESION TYPE: Chronic | ICD-10-CM

## 2022-03-26 RX ORDER — METOPROLOL SUCCINATE 25 MG/1
TABLET, EXTENDED RELEASE ORAL
Qty: 30 TABLET | Refills: 8 | Status: SHIPPED | OUTPATIENT
Start: 2022-03-26

## 2022-03-26 RX ORDER — RIVAROXABAN 15 MG/1
TABLET, FILM COATED ORAL
Qty: 30 TABLET | Refills: 8 | Status: SHIPPED | OUTPATIENT
Start: 2022-03-26

## 2022-03-26 RX ORDER — ATORVASTATIN CALCIUM 20 MG/1
TABLET, FILM COATED ORAL
Qty: 30 TABLET | Refills: 8 | Status: SHIPPED | OUTPATIENT
Start: 2022-03-26

## 2022-03-30 NOTE — TELEPHONE ENCOUNTER
S/w pt, advised of the same. Pt verbalized understanding and agreeable to repeat injection. Please place order, thank you.

## 2022-03-31 ENCOUNTER — TELEPHONE (OUTPATIENT)
Dept: PAIN MEDICINE | Facility: CLINIC | Age: 82
End: 2022-03-31

## 2022-03-31 DIAGNOSIS — M50.120 CERVICAL DISC DISORDER WITH RADICULOPATHY OF MID-CERVICAL REGION: Primary | ICD-10-CM

## 2022-04-04 ENCOUNTER — NURSE TRIAGE (OUTPATIENT)
Dept: OTHER | Facility: OTHER | Age: 82
End: 2022-04-04

## 2022-04-04 NOTE — TELEPHONE ENCOUNTER
Reason for Disposition   Large swelling or bruise > 2 inches (5 cm)    Answer Assessment - Initial Assessment Questions  1  MECHANISM: "How did the injury happen?" (e g , twisting injury, direct blow)       Soco Spindle out of bed  2  ONSET: "When did the injury happen?" (Minutes or hours ago)       Hit it on night stand  3  LOCATION: "Where is the injury located?"       Knee to ankle was bruised  4  APPEARANCE of INJURY: "What does the injury look like?"  (e g , deformity of leg)      Small opening but bigger than a quarter  5  SEVERITY: "Can you put weight on that leg?" "Can you walk?"       denies  6  SIZE: For cuts, bruises, or swelling, ask: "How large is it?" (e g , inches or centimeters)       Swelling today  7  PAIN: "Is there pain?" If Yes, ask: "How bad is the pain?"  (Scale 1-10; or mild, moderate, severe)      Pain is there "not good at deciphering"  8   OTHER SYMPTOMS: "Do you have any other symptoms?"       denies    Protocols used: LEG INJURY-ADULT-

## 2022-04-04 NOTE — TELEPHONE ENCOUNTER
Regarding: leg injury open wound   ----- Message from Bonnie Sellers sent at 4/4/2022  6:19 PM EDT -----  "I feel out of bed on Saturday and I have a open wound its my right leg and I think its infected and I want to talk to someone and make a sick appointment

## 2022-04-05 ENCOUNTER — APPOINTMENT (OUTPATIENT)
Dept: RADIOLOGY | Facility: MEDICAL CENTER | Age: 82
End: 2022-04-05
Payer: MEDICARE

## 2022-04-05 ENCOUNTER — TELEPHONE (OUTPATIENT)
Dept: FAMILY MEDICINE CLINIC | Facility: MEDICAL CENTER | Age: 82
End: 2022-04-05

## 2022-04-05 ENCOUNTER — OFFICE VISIT (OUTPATIENT)
Dept: FAMILY MEDICINE CLINIC | Facility: MEDICAL CENTER | Age: 82
End: 2022-04-05
Payer: MEDICARE

## 2022-04-05 VITALS
DIASTOLIC BLOOD PRESSURE: 80 MMHG | HEART RATE: 88 BPM | OXYGEN SATURATION: 97 % | SYSTOLIC BLOOD PRESSURE: 146 MMHG | WEIGHT: 143 LBS | BODY MASS INDEX: 28.07 KG/M2 | HEIGHT: 60 IN
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
## 2022-04-05 DIAGNOSIS — L08.9 SKIN INFECTION: ICD-10-CM
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
## 2022-04-05 PROCEDURE — 99213 OFFICE O/P EST LOW 20 MIN: CPT | Performed by: STUDENT IN AN ORGANIZED HEALTH CARE EDUCATION/TRAINING PROGRAM

## 2022-04-05 PROCEDURE — 73590 X-RAY EXAM OF LOWER LEG: CPT

## 2022-04-05 PROCEDURE — 73060 X-RAY EXAM OF HUMERUS: CPT

## 2022-04-05 RX ORDER — CEPHALEXIN 500 MG/1
500 CAPSULE ORAL EVERY 12 HOURS SCHEDULED
Qty: 10 CAPSULE | Refills: 0 | Status: SHIPPED | OUTPATIENT
Start: 2022-04-05 | End: 2022-04-05 | Stop reason: CLARIF

## 2022-04-05 RX ORDER — CEPHALEXIN 500 MG/1
500 CAPSULE ORAL EVERY 12 HOURS SCHEDULED
Qty: 10 CAPSULE | Refills: 0 | Status: SHIPPED | OUTPATIENT
Start: 2022-04-05 | End: 2022-04-10

## 2022-04-05 RX ORDER — SACCHAROMYCES BOULARDII 250 MG
250 CAPSULE ORAL 2 TIMES DAILY
Qty: 14 CAPSULE | Refills: 0 | Status: SHIPPED | OUTPATIENT
Start: 2022-04-05 | End: 2022-04-12

## 2022-04-05 NOTE — PROGRESS NOTES
[address] [facility] - Clinic Note  [facility]     Germania Ordoñez MRN: [id number] : 1940 Age: 79 y o  Assessment/Plan     1  Fall, initial encounter    - patient presents today with concern about infection  She did hit her right side on a nightstand when attempting to arise from bed to use restroom overnight Saturday  Her and her partner report the bed they were using at that time was higher above ground and they have since switched to bed closer to ground  Discussed mechanical fall prevention  Patient did not hit head or lose consciousness during the event  Patient does have history of osteoporosis (advised vitamin-D and calcium supplementation) and anticoagulated with Xarelto for history of atrial fibrillation  She does have appreciable amount of ecchymosis right lower extremity with hematoma  Ice application advisable  Patient able to bear weight after event and has had no issues thereafter as far as ambulation  Vital signs stable, no chest pain, no shortness of breath  She does have neck pain but that is chronic in nature following with pain management  No spinous process tenderness  - XR humerus right; Future  - XR tibia fibula 2 vw right; Future      2  Skin infection    - there is demarcated region of erythema within right lower extremity ecchymosis that is warm to touch, antibiotic prescribed and close follow-up this Friday for reassessment  Vital signs stable  - cephalexin (KEFLEX) 500 mg capsule; Take 1 capsule (500 mg total) by mouth every 12 (twelve) hours for 5 days  Dispense: 10 capsule; Refill: 0  - saccharomyces boulardii (FLORASTOR) 250 mg capsule; Take 1 capsule (250 mg total) by mouth 2 (two) times a day for 7 days  Dispense: 14 capsule; Refill: 0      [place] [employer] acknowledged understanding of treatment plan, all questions answered  Subjective     Patient is here for evaluation after a fall    Patient does have history of osteoporosis and is currently on anticoagulation  She did not hit her head  Patient reportedly was "getting out of bed and lost balance" when she fell from seated position and hit nightstand "on right side, our bed is so high when I get out if it I slide out of it"  The accident occurred 3 days ago  It is reported that the patient did not have LOC, was ambulatory on scene, was not entrapped, denies alcohol consumption and denies drug use  At this time she complains of neck pain but, goes on to mention that is chronic in nature sees Pain Management  She has contusions right upper and lower extremities  Patient denies headache, fever, visual change, hearing loss, chest pain, abdominal pain, hematuria, flank pain, nausea, vomiting, numbness, tingling, incontinence, inability to ambulate and heel pain  The following portions of the patient's history were reviewed and updated as appropriate: allergies, current medications, past family history, past medical history, past social history, past surgical history and problem list      Past Medical History:   Diagnosis Date    Anal fissure     Cardiac disorder     Cognitive changes 12/23/2020    Esophageal reflux     Esophagitis, reflux     Hemorrhoids     Hepatic hemangioma     Last Assessed: 1/13/2015    Herpes zoster     History of colonic polyps     Hypertension     [facility]schem[facility] colitis [facility] [address] [facility]     Lumbar herniated disc     Malignant neoplasm without specification of site ([facility] [address] )     Nephrolithiasis     L   Lithotripsy    Nontoxic single thyroid nodule     Last Assessed: 1/13/2015    Osteoarthritis     Overactive bladder     Raynaud disease     Respiratory system disease     Sjogren's disease ([facility] [address] [facility]     Spinal stenosis     PONCHO (stress urinary incontinence, female)     Uterovaginal prolapse     Grade I-II       Allergies   Allergen Reactions    Sulfa Antibiotics Anaphylaxis    Formaldehyde     Codeine Palpitations       Past Surgical History:   Procedure Laterality Date  APPENDECTOMY  1947    CARDIAC PACEMAKER PLACEMENT  01/2021    CARDIAC SURGERY      CABG    CATARACT EXTRACTION Bilateral     COLONOSCOPY  2012    Fiberoptic    COLONOSCOPY      Resolved: 2006 - 2012 5 year f/u    CORONARY ANGIOPLASTY WITH STENT PLACEMENT      CORONARY ARTERY BYPASS GRAFT      Resolved: 2012    ESOPHAGOGASTRODUODENOSCOPY  2012    Diagnostic    HEMORROIDECTOMY      KNEE SURGERY      LITHOTRIPSY      Renal    MALIGNANT SKIN LESION EXCISION      Face; Resolved: 2004    [place] ESOPHAGOGASTRODUODENOSCOPY TRANSORAL DIAGNOSTIC N/A 4/13/2016    Procedure: EGD AND COLONOSCOPY;  Surgeon: Azam Todd MD;  [facility];   Service: Gastroenterology    RENAL ARTERY STENT      SKIN LESION EXCISION      Scalp    SOFT TISSUE TUMOR RESECTION      Shoulder; Resolved: 1995    THROMBOLYSIS      Postoperative Thrombolysis PTCA    TONSILLECTOMY      Resolved: 1944       Family History   Problem Relation Age of Onset    Heart disease Mother     Hypertension Mother     Osteoporosis Mother     Heart disease Father     Hypertension Father     Ulcerative colitis Family     Colon polyps Family     No Known Problems Sister     Heart disease Brother     Diabetes Brother     No Known Problems Maternal Grandmother     No Known Problems Maternal Grandfather     No Known Problems Paternal Grandmother     No Known Problems Paternal Grandfather     No Known Problems Son     No Known Problems Daughter        Social History     Socioeconomic History    Marital status: /Civil Union     Spouse name: None    Number of children: None    Years of education: None    Highest education level: None   Occupational History    Occupation: retired   Tobacco Use    Smoking status: Never Smoker    Smokeless tobacco: Never Used   Vaping Use    Vaping Use: Never used   Substance and Sexual Activity    Alcohol use: Not Currently     Alcohol/week: 1 0 standard drink     Types: 1 Glasses of wine per week    Drug use: Never    Sexual activity: Not Currently   Other Topics Concern    None   Social History Narrative    Activities: golf    Caffeine use    Consumes healthy diet    Daily caffeinated coffee consumption: 1 cup per day    Drinks caffeinated tea: 1 cup per day    Well balanced diet     Social Determinants of Health     Financial Resource Strain: Not on file   Food Insecurity: Not on file   Transportation Needs: Not on file   Physical Activity: Not on file   Stress: Not on file   Social Connections: Not on file   Intimate Partner Violence: Not on file   Housing Stability: Not on file       Current Outpatient Medications   Medication Sig Dispense Refill    acetaminophen (TYLENOL) 325 mg tablet Take 2 tablets (650 mg total) by mouth every 6 (six) hours as needed for mild pain  0    amiodarone 200 mg tablet TAKE 1 TABLET (200 MG TOTAL) BY MOUTH DAILY WITH BREAKFAST 30 tablet 8    amLODIPine (NORVASC) 5 mg tablet Take 1 tablet (5 mg total) by mouth daily 90 tablet 3    atorvastatin (LIPITOR) 20 mg tablet TAKE 1 TABLET BY MOUTH EVERY DAY WITH DINNER 30 tablet 8    gabapentin (NEURONTIN) 100 mg capsule Take 1 tablet at bedtime  May increase to 2 tablets after 3 days 60 capsule 1    levETIRAcetam (KEPPRA) 500 mg tablet Take 1 tablet (500 mg total) by mouth every 12 (twelve) hours 60 tablet 3    metoprolol succinate (TOPROL-XL) 25 mg 24 hr tablet TAKE 1 TABLET BY MOUTH EVERY DAY 30 tablet 8    multivitamin (THERAGRAN) TABS Take 1 tablet by mouth daily        nitroglycerin (NITROSTAT) 0 4 mg SL tablet Place 1 tablet (0 4 mg total) under the tongue every 5 (five) minutes as needed for chest pain 25 tablet 0    prasugrel (EFFIENT) tablet Take 1 tablet (10 mg total) by mouth daily 90 tablet 3    Xarelto 15 MG tablet TAKE 1 TABLET (15 MG TOTAL) BY MOUTH DAILY WITH DINNER 30 tablet 8    bumetanide (BUMEX) 2 mg tablet TAKE 1 TABLET BY MOUTH EVERY DAY (Patient not taking: Reported on 4/5/2022) 30 tablet 8  cephalexin (KEFLEX) 500 mg capsule Take 1 capsule (500 mg total) by mouth every 12 (twelve) hours for 5 days 10 capsule 0    magnesium oxide (MAG-OX) 400 mg Take 1 tablet (400 mg total) by mouth 2 (two) times a day before lunch and dinner  0    methocarbamol (ROBAXIN) 500 mg tablet Take 1 tablet (500 mg total) by mouth 3 (three) times a day 30 tablet 1    potassium chloride (K-DUR,KLOR-CON) 20 mEq tablet Take 2 tablets (40 mEq total) by mouth daily (Patient not taking: Reported on 1/13/2022 ) 60 tablet 8    saccharomyces boulardii (FLORASTOR) 250 mg capsule Take 1 capsule (250 mg total) by mouth 2 (two) times a day for 7 days 14 capsule 0    sertraline (ZOLOFT) 25 mg tablet TAKE 1 TABLET BY MOUTH EVERY DAY 30 tablet 2     No current facility-administered medications for this visit  Review of Systems   Constitutional: Negative for chills and fever  HENT: Negative for hearing loss  Eyes: Negative for visual disturbance  Respiratory: Negative for shortness of breath  Cardiovascular: Positive for leg swelling  Negative for chest pain  Genitourinary: Negative for hematuria  Musculoskeletal: Positive for neck pain (chronic)  Negative for gait problem  Neurological: Negative for numbness and headaches  And as noted in HPI    Objective      /80 (BP Location: Left arm, Patient Position: Sitting, Cuff Size: Adult)   Pulse 88   Ht 4' 11 5" (1 511 m)   Wt 64 9 kg (143 lb)   SpO2 97%   BMI 28 40 kg/m²     Physical Exam  Vitals reviewed  Constitutional:       General: She is not in acute distress  Appearance: She is not ill-appearing, toxic-appearing or diaphoretic  HENT:      Head: Normocephalic and atraumatic  Right Ear: External ear normal       Left Ear: External ear normal    Eyes:      Extraocular Movements: Extraocular movements intact  Conjunctiva/sclera: Conjunctivae normal       Pupils: Pupils are equal, round, and reactive to light     Pulmonary: Effort: Pulmonary effort is normal  No respiratory distress  Musculoskeletal:      Cervical back: No edema  No pain with movement or spinous process tenderness  Decreased range of motion: with rotation to right    Skin:     General: Skin is warm and dry  Findings: Bruising present  Comments: Ecchymosisright upper extremity as pictured, associated tenderness  Extensive ecchymosis right lower extremity, hematoma also present within this region of ecchymosis  There is a demarcated region of erythema that is warm to touch as well  Neurological:      Mental Status: She is alert and oriented to person, place, and time  Psychiatric:         Behavior: Behavior normal          Thought Content: Thought content normal                       Some portions of this record may have been generated with voice recognition software  There may be translation, syntax, or grammatical errors  Occasional wrong word or "sound-a-like" substitutions may have occurred due to the inherent limitations of the voice recognition software  Read the chart carefully and recognize, using context, where substations may have occurred  If you have any questions, please contact the dictating provider for clarification or correction, as needed

## 2022-04-05 NOTE — TELEPHONE ENCOUNTER
Left message on Leander's cell phone  House phone went right to busy  Put 2pm on hold on Dr Paco Cardoza schedule

## 2022-04-12 ENCOUNTER — OFFICE VISIT (OUTPATIENT)
Dept: CARDIOLOGY CLINIC | Facility: CLINIC | Age: 82
End: 2022-04-12
Payer: MEDICARE

## 2022-04-12 VITALS
SYSTOLIC BLOOD PRESSURE: 142 MMHG | DIASTOLIC BLOOD PRESSURE: 82 MMHG | BODY MASS INDEX: 27.92 KG/M2 | WEIGHT: 142.2 LBS | OXYGEN SATURATION: 97 % | HEART RATE: 86 BPM | HEIGHT: 60 IN

## 2022-04-12 DIAGNOSIS — I49.5 SICK SINUS SYNDROME (HCC): ICD-10-CM

## 2022-04-12 DIAGNOSIS — I25.810 CORONARY ARTERY DISEASE INVOLVING OTHER CORONARY ARTERY BYPASS GRAFT WITHOUT ANGINA PECTORIS: ICD-10-CM

## 2022-04-12 DIAGNOSIS — I65.23 CAROTID STENOSIS, ASYMPTOMATIC, BILATERAL: ICD-10-CM

## 2022-04-12 DIAGNOSIS — E78.5 HYPERLIPIDEMIA, UNSPECIFIED HYPERLIPIDEMIA TYPE: ICD-10-CM

## 2022-04-12 DIAGNOSIS — I27.20 PULMONARY HYPERTENSION (HCC): ICD-10-CM

## 2022-04-12 DIAGNOSIS — I70.1 RENAL ARTERY STENOSIS (HCC): ICD-10-CM

## 2022-04-12 DIAGNOSIS — I48.0 PAROXYSMAL ATRIAL FIBRILLATION (HCC): Primary | ICD-10-CM

## 2022-04-12 DIAGNOSIS — I50.43 ACUTE ON CHRONIC COMBINED SYSTOLIC AND DIASTOLIC CONGESTIVE HEART FAILURE (HCC): ICD-10-CM

## 2022-04-12 DIAGNOSIS — M50.120 CERVICAL DISC DISORDER WITH RADICULOPATHY OF MID-CERVICAL REGION: ICD-10-CM

## 2022-04-12 DIAGNOSIS — I10 ESSENTIAL HYPERTENSION: ICD-10-CM

## 2022-04-12 DIAGNOSIS — I48.91 A-FIB (HCC): ICD-10-CM

## 2022-04-12 DIAGNOSIS — I49.5 TACHY-BRADY SYNDROME (HCC): ICD-10-CM

## 2022-04-12 DIAGNOSIS — Z95.1 HX OF CABG: ICD-10-CM

## 2022-04-12 DIAGNOSIS — I73.9 PAD (PERIPHERAL ARTERY DISEASE) (HCC): ICD-10-CM

## 2022-04-12 DIAGNOSIS — Z79.899 LONG TERM CURRENT USE OF AMIODARONE: ICD-10-CM

## 2022-04-12 PROCEDURE — 99214 OFFICE O/P EST MOD 30 MIN: CPT | Performed by: INTERNAL MEDICINE

## 2022-04-12 PROCEDURE — 93000 ELECTROCARDIOGRAM COMPLETE: CPT | Performed by: INTERNAL MEDICINE

## 2022-04-12 RX ORDER — AMIODARONE HYDROCHLORIDE 200 MG/1
100 TABLET ORAL
Qty: 30 TABLET | Refills: 8 | Status: SHIPPED | OUTPATIENT
Start: 2022-04-12 | End: 2022-04-28 | Stop reason: SDUPTHER

## 2022-04-12 NOTE — LETTER
April 12, 2022     Renetta Sykes MD  [facility] [address]    Patient: Marvin Delgado   YOB: 1940   Date of Visit: 4/12/2022       Dear Dr Andrez Sharma: Thank you for referring Roxi Edmonds to me for evaluation  Below are my notes for this consultation  If you have questions, please do not hesitate to call me  I look forward to following your patient along with you  Sincerely,        Leander Gong MD        CC: DO Felisha Nguyen MD  4/12/2022  1:32 PM  Sign when Signing Visit                                             Cardiology Follow Up    Marvin Delgado  1940  [id number]  [address]  [facility] [address]  [phone]  [phone]    2  Paroxysmal atrial fibrillation ([address] )  POCT ECG    Ambulatory Referral to Cardiology   2  Essential hypertension  Ambulatory Referral to Cardiology   3  Coronary artery disease involving other coronary artery bypass graft without angina pectoris  Ambulatory Referral to Cardiology   4  Hx of CABG  Ambulatory Referral to Cardiology   5  Acute on chronic combined systolic and diastolic congestive heart failure ([address] )     6  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[address] )     7  PAD (peripheral artery disease) ([address] )     8  Renal artery stenosis ([address] )     9  Pulmonary hypertension ([address] )     10  Sick sinus syndrome ([address] )     11  Carotid stenosis, asymptomatic, bilateral     12  Cervical disc disorder with radiculopathy of mid-cervical region     13  Hyperlipidemia, unspecified hyperlipidemia type     14   Long term current use of amiodarone         Interval History:   The patient has been doing well   No limitations in day-to-day activity   No cardiac symptoms  She does have left arm pain which is being related to her cervical vertebrae    The patient has significant medical illnesses which include  Paroxysmal atrial fibrillation, symptomatic  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LBBB at baseline  LVEF 45% by echo,  History of CAD, prior CABG  Moderate pulmonary hypertension  Hyperlipidemia  Bilateral renal artery stenosis  Suspected Sjogren's      Patient is not complaining of anginal like chest pain or pressure   No worsening orthopnea or PND   No worsening leg swelling    No significant palpitations   No presyncope or syncope  No orthostatic lightheadedness    Some improvement in exertional tolerance    Patient is a never smoker  No history of alcohol abuse   No history of drug abuse   No history of excessive caffeinated beverages    Patient is not complaining of snoring, morning fatigue or daytime sleepiness    Patient does have a history of pulmonary hypertension and bilateral renal artery stenosis   She also has a history of coronary artery disease and is post bypass    Patient has a family history of hypertension, coronary artery disease, ulcerative colitis    /82 (BP Location: Left arm, Patient Position: Sitting, Cuff Size: Standard)   Pulse 86   Ht 4' 11 5" (1 511 m)   Wt 64 5 kg (142 lb 3 2 oz)   SpO2 97%   BMI 28 24 kg/m²           Patient Active Problem List   Diagnosis    Essential hypertension    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)    A-fib (HCC)    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[address] )    PAD (peripheral artery disease) ([address] )    Abnormal liver enzymes    Edema of left upper extremity    Urinary retention    Seizure-like activity ([address] )    Hyperlipidemia    Toxic metabolic encephalopathy    UTI (urinary tract infection)    Depression    Current use of long term anticoagulation    Long term current use of amiodarone    Renal artery stenosis (HCC)    Pulmonary hypertension ([address] )    Sick sinus syndrome ([address] )    Hx of CABG    CAD (coronary artery disease)    Memory loss    Mild cognitive impairment    Atherosclerosis with claudication of extremity ([address] )    Carotid stenosis, asymptomatic, bilateral    Acute (reversible) ischemia of small intestine, extent unspecified ([place] [address] )    Cervical disc disorder with radiculopathy of mid-cervical region     Past Medical History:   Diagnosis Date    Anal fissure     Cardiac disorder     Cognitive changes 12/23/2020    Esophageal reflux     Esophagitis, reflux     Hemorrhoids     Hepatic hemangioma     Last Assessed: 1/13/2015    Herpes zoster     History of colonic polyps     Hypertension     Ischemic colitis ([facility] )     Lumbar herniated disc     Malignant neoplasm without specification of site ([place] [address] )     Nephrolithiasis     L   Lithotripsy    Nontoxic single thyroid nodule     Last Assessed: 1/13/2015    Osteoarthritis     Overactive bladder     Raynaud disease     Respiratory system disease     Sjogren's disease ([place] [address] )     Spinal stenosis     PONCHO (stress urinary incontinence, female)     Uterovaginal prolapse     Grade I-II     Social History     Socioeconomic History    Marital status: /Civil Union     Spouse name: Not on file    Number of children: Not on file    Years of education: Not on file    Highest education level: Not on file   Occupational History    Occupation: retired   Tobacco Use    Smoking status: Never Smoker    Smokeless tobacco: Never Used   Vaping Use    Vaping Use: Never used   Substance and Sexual Activity    Alcohol use: Not Currently     Alcohol/week: 1 0 standard drink     Types: 1 Glasses of wine per week    Drug use: Never    Sexual activity: Not Currently   Other Topics Concern    Not on file   Social History Narrative    Activities: golf    Caffeine use    Consumes healthy diet    Daily caffeinated coffee consumption: 1 cup per day    Drinks caffeinated tea: 1 cup per day    Well balanced diet     Social Determinants of Health     Financial Resource Strain: Not on file   Food Insecurity: Not on file   Transportation Needs: Not on file   Physical Activity: Not on file   Stress: Not on file   Social Connections: Not on file Intimate Partner Violence: Not on file   Housing Stability: Not on file      Family History   Problem Relation Age of Onset    Heart disease Mother     Hypertension Mother     Osteoporosis Mother     Heart disease Father     Hypertension Father     Ulcerative colitis Family     Colon polyps Family     No Known Problems Sister     Heart disease Brother     Diabetes Brother     No Known Problems Maternal Grandmother     No Known Problems Maternal Grandfather     No Known Problems Paternal Grandmother     No Known Problems Paternal Grandfather     No Known Problems Son     No Known Problems Daughter      Past Surgical History:   Procedure Laterality Date    APPENDECTOMY  1947    CARDIAC PACEMAKER PLACEMENT  01/2021    CARDIAC SURGERY      CABG    CATARACT EXTRACTION Bilateral     COLONOSCOPY  2012    Fiberoptic    COLONOSCOPY      Resolved: 2006 - 2012 5 year f/u    CORONARY ANGIOPLASTY WITH STENT PLACEMENT      CORONARY ARTERY BYPASS GRAFT      Resolved: 2012    ESOPHAGOGASTRODUODENOSCOPY  2012    Diagnostic    HEMORROIDECTOMY      KNEE SURGERY      LITHOTRIPSY      Renal    MALIGNANT SKIN LESION EXCISION      Face; Resolved: 2004    WA ESOPHAGOGASTRODUODENOSCOPY TRANSORAL DIAGNOSTIC N/A 4/13/2016    Procedure: EGD AND COLONOSCOPY;  Surgeon: Bernardino Vitale MD;  [facility];   Service: Gastroenterology    RENAL ARTERY STENT      SKIN LESION EXCISION      Scalp    SOFT TISSUE TUMOR RESECTION      Shoulder; Resolved: 1995    THROMBOLYSIS      Postoperative Thrombolysis PTCA    TONSILLECTOMY      Resolved: 1944       Current Outpatient Medications:     acetaminophen (TYLENOL) 325 mg tablet, Take 2 tablets (650 mg total) by mouth every 6 (six) hours as needed for mild pain, Disp: , Rfl: 0    amiodarone 200 mg tablet, TAKE 1 TABLET (200 MG TOTAL) BY MOUTH DAILY WITH BREAKFAST, Disp: 30 tablet, Rfl: 8    amLODIPine (NORVASC) 5 mg tablet, Take 1 tablet (5 mg total) by mouth daily, Disp: 90 tablet, Rfl: 3    atorvastatin (LIPITOR) 20 mg tablet, TAKE 1 TABLET BY MOUTH EVERY DAY WITH DINNER, Disp: 30 tablet, Rfl: 8    bumetanide (BUMEX) 2 mg tablet, TAKE 1 TABLET BY MOUTH EVERY DAY, Disp: 30 tablet, Rfl: 8    magnesium oxide (MAG-OX) 400 mg, Take 1 tablet (400 mg total) by mouth 2 (two) times a day before lunch and dinner, Disp: , Rfl: 0    metoprolol succinate (TOPROL-XL) 25 mg 24 hr tablet, TAKE 1 TABLET BY MOUTH EVERY DAY, Disp: 30 tablet, Rfl: 8    multivitamin (THERAGRAN) TABS, Take 1 tablet by mouth daily  , Disp: , Rfl:     nitroglycerin (NITROSTAT) 0 4 mg SL tablet, Place 1 tablet (0 4 mg total) under the tongue every 5 (five) minutes as needed for chest pain, Disp: 25 tablet, Rfl: 0    prasugrel (EFFIENT) tablet, Take 1 tablet (10 mg total) by mouth daily, Disp: 90 tablet, Rfl: 3    saccharomyces boulardii (FLORASTOR) 250 mg capsule, Take 1 capsule (250 mg total) by mouth 2 (two) times a day for 7 days, Disp: 14 capsule, Rfl: 0    sertraline (ZOLOFT) 25 mg tablet, TAKE 1 TABLET BY MOUTH EVERY DAY, Disp: 30 tablet, Rfl: 2    Xarelto 15 MG tablet, TAKE 1 TABLET (15 MG TOTAL) BY MOUTH DAILY WITH DINNER, Disp: 30 tablet, Rfl: 8    gabapentin (NEURONTIN) 100 mg capsule, Take 1 tablet at bedtime    May increase to 2 tablets after 3 days (Patient not taking: Reported on 4/12/2022 ), Disp: 60 capsule, Rfl: 1    levETIRAcetam (KEPPRA) 500 mg tablet, Take 1 tablet (500 mg total) by mouth every 12 (twelve) hours (Patient not taking: Reported on 4/12/2022 ), Disp: 60 tablet, Rfl: 3    methocarbamol (ROBAXIN) 500 mg tablet, Take 1 tablet (500 mg total) by mouth 3 (three) times a day (Patient not taking: Reported on 4/12/2022 ), Disp: 30 tablet, Rfl: 1    potassium chloride (K-DUR,KLOR-CON) 20 mEq tablet, Take 2 tablets (40 mEq total) by mouth daily (Patient not taking: Reported on 1/13/2022 ), Disp: 60 tablet, Rfl: 8  Allergies   Allergen Reactions    Sulfa Antibiotics Anaphylaxis    Formaldehyde     Codeine Palpitations       Labs:  Lab Results   Component Value Date     12/24/2015    K 4 0 02/24/2021    K 3 6 12/24/2015     02/24/2021     12/24/2015    CO2 31 02/24/2021    CO2 33 (H) 01/29/2021    BUN 15 02/24/2021    BUN 9 12/24/2015    CREATININE 1 22 02/24/2021    CREATININE 0 77 12/24/2015    GLUCOSE 106 01/29/2021    GLUCOSE 97 12/24/2015    CALCIUM 8 9 02/24/2021    CALCIUM 8 6 12/24/2015     Lab Results   Component Value Date    TROPONINI 0 03 02/24/2021     Lab Results   Component Value Date    WBC 6 18 02/24/2021    WBC 4 91 12/24/2015    HGB 11 9 02/24/2021    HGB 12 1 12/24/2015    HCT 36 7 02/24/2021    HCT 37 7 12/24/2015    MCV 95 02/24/2021    MCV 90 12/24/2015     02/24/2021     12/24/2015     Lab Results   Component Value Date    CHOL 231 07/14/2015    TRIG 88 12/04/2020    TRIG 51 07/14/2015    HDL 62 12/04/2020    HDL 79 07/14/2015    LDLDIRECT 116 (H) 09/22/2020     Imaging:       DEVICE CHECK  2-21-22    MDT-DUAL CHAMBER PPM (DDDR MODE)/ACTIVE SYSTEM IS MRI CONDITIONAL   CARELINK TRANSMISSION: BATTERY VOLTAGE ADEQUATE (9 6 YRS)  AP: 94 9%  : 99 7% (>40%~MVP-OFF~DDDR/70)  ALL AVAILABLE LEAD PARAMETERS WITHIN NORMAL LIMITS  NO SIGNIFICANT HIGH RATE EPISODES  PACEMAKER FUNCTIONING APPROPRIATELY  [facility]       Echocardiogram   January 2021  LEFT VENTRICLE:  Size was normal   Systolic function was at the lower limits of normal  Ejection fraction was estimated to be 50 %  There were no regional wall motion abnormalities  There was severe hypokinesis of the distal septum  Wall thickness was mildly increased    Features were consistent with a pseudonormal left ventricular filling pattern, with concomitant abnormal relaxation and increased filling pressure (grade 2 diastolic dysfunction)      RIGHT VENTRICLE:  The size was normal   Systolic function was normal      LEFT ATRIUM:  The atrium was mildly dilated      MITRAL VALVE:  There was mild to moderate regurgitation      TRICUSPID VALVE:  There was mild regurgitation  Pulmonary artery systolic pressure was markedly increased  The findings suggest severe pulmonary hypertension      IVC, HEPATIC VEINS:  The inferior vena cava was mildly dilated  Respirophasic changes were blunted (less than 50% variation)              Review of Systems:  Review of Systems   All other systems reviewed and are negative  as described in my history of present illness        Physical Exam:  Physical Exam  Vitals reviewed  Constitutional:       Appearance: Normal appearance  She is well-developed  She is not ill-appearing  Comments: Not in any distress at the current time   HENT:      Head: Normocephalic and atraumatic  Right Ear: External ear normal       Left Ear: External ear normal       Nose: Nose normal       Mouth/Throat:      Pharynx: Uvula midline  Eyes:      General: Lids are normal  No scleral icterus  Conjunctiva/sclera: Conjunctivae normal       Comments: No pallor  No cyanosis  No icterus   Neck:      Thyroid: No thyromegaly  Vascular: No carotid bruit or JVD  Trachea: Trachea normal       Comments: No jugular lymphadenopathy  Cardiovascular:      Rate and Rhythm: Normal rate and regular rhythm  Chest Wall: PMI is not displaced  Pulses: Normal pulses  Heart sounds: Normal heart sounds, S1 normal and S2 normal  No murmur heard  No friction rub  No gallop  No S3 or S4 sounds  Pulmonary:      Effort: Pulmonary effort is normal  No accessory muscle usage or respiratory distress  Breath sounds: Normal breath sounds  No decreased breath sounds, wheezing, rhonchi or rales  Chest:      Chest wall: No tenderness  Abdominal:      General: Abdomen is flat  Bowel sounds are normal  There is no distension  Palpations: Abdomen is soft  There is no hepatomegaly, splenomegaly or mass  Tenderness: There is no abdominal tenderness     Musculoskeletal: General: No swelling, tenderness or deformity  Normal range of motion  Cervical back: Normal range of motion and neck supple  No rigidity  No muscular tenderness  Lymphadenopathy:      Cervical: No cervical adenopathy  Skin:     Coloration: Skin is not jaundiced or pale  Findings: No abrasion, bruising, erythema, lesion or rash  Nails: There is no clubbing  Neurological:      Mental Status: She is alert and oriented to person, place, and time  Mental status is at baseline  Cranial Nerves: No cranial nerve deficit  Comments: Facial symmetry is retained  Extraocular movements are retained  Head neck tongue and palate movement are retained and symmetric   Psychiatric:         Mood and Affect: Mood normal          Speech: Speech normal          Behavior: Behavior normal          Thought Content: Thought content normal          Discussion/Summary:    1  New onset AFib with RVR, paroxysmal  Anticoagulation-chads Vasc post 6-age, gender, CAD, CHF, hypertension-on rivaroxaban  Rate control-metoprolol after device has been placed   Rhythm control - on amiodarone currently, need to follow for toxicity  No history of prior cardioversion or ablation      2  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3  Left bundle branch block at baseline  Patient is post permanent pacemaker  His lead in the septum-RBBB in pacing      4  Moderate pulmonary hypertension   Avoid volume overload and control rate  Continue with bumetanide      5  Essential hypertension  Blood pressure today is 132/80   Patient is currently on metoprolol, bumetanide, amlodipine      6  Heart failure with preserved ejection fraction  Secondary to of RVR   Diuresis and rate control       7   History of CAD post CABG  - prior CABG (LIMA to LAD, SVG to D1, SVG to OM1, SVG to RCA) 2011 / revascularization of LAD in diagonal branch with PCI in late 2011  - now status post LAMINE to proximal LAD and ostial left main on 1/11/2021  - maintained on aspirin, statin, beta blocker, and Effient / also on Ranexa and Imdur      8  Hyperlipidemia  On atorvastatin  Tolerating it without any myositis or myalgia      9  Bilateral renal artery stenosis  Needs to avoid Ace inhibitors or ARB      10  Diarrhea  11  Hypokalemia  Has a history of same  Hypokalemia precipitated the arrhythmia   Routine potassium supplementation with BMP monitoring      12  Episode of seizure   Need to follow up with Neurology to decide on antiepileptic medication use      13  Long-term anticoagulation  PAF   Chads Vasc score 6   Continue with rivaroxaban      14   Amiodarone use  Started in the hospital for AFib with RVR and diastolic heart failure  Follow-up with TSH, CMP, PFT with DLCO, eye evaluation          Summary of my recommendation for this patient   Currently continue with anticoagulation  Reduce amiodarone to 100 mg a day  Follow-up with long-term toxicity for amiodarone-check TSH, CMP, PFT with DLCO, eye evaluation  For antiepileptic medication used follow-up with Neurology  Follow up with general cardiologist at [facility]

## 2022-04-12 NOTE — PROGRESS NOTES
Cardiology Follow Up    [facility]  1940  [id number]  [facility] [place]  [address]  [address]  [phone]    1  Paroxysmal atrial fibrillation ([address] )  POCT ECG    Ambulatory Referral to Cardiology   2  Essential hypertension  Ambulatory Referral to Cardiology   3  Coronary artery disease involving other coronary artery bypass graft without angina pectoris  Ambulatory Referral to Cardiology   4  Hx of CABG  Ambulatory Referral to Cardiology   5  Acute on chronic combined systolic and diastolic congestive heart failure ([address] )     6  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[address] )     7  PAD (peripheral artery disease) ([address] )     8  Renal artery stenosis ([address] )     9  Pulmonary hypertension ([address] )     10  Sick sinus syndrome ([address] )     11  Carotid stenosis, asymptomatic, bilateral     12  Cervical disc disorder with radiculopathy of mid-cervical region     13  Hyperlipidemia, unspecified hyperlipidemia type     14   Long term current use of amiodarone         Interval History:   The patient has been doing well   No limitations in day-to-day activity   No cardiac symptoms  She does have left arm pain which is being related to her cervical vertebrae    The patient has significant medical illnesses which include  Paroxysmal atrial fibrillation, symptomatic  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LBBB at baseline  LVEF 45% by echo,  History of CAD, prior CABG  Moderate pulmonary hypertension  Hyperlipidemia  Bilateral renal artery stenosis  Suspected Sjogren's      Patient is not complaining of anginal like chest pain or pressure   No worsening orthopnea or PND   No worsening leg swelling    No significant palpitations   No presyncope or syncope  No orthostatic lightheadedness    Some improvement in exertional tolerance    Patient is a never smoker  No history of alcohol abuse   No history of drug abuse   No history of excessive caffeinated beverages    Patient is not complaining of snoring, morning fatigue or daytime sleepiness    Patient does have a history of pulmonary hypertension and bilateral renal artery stenosis   She also has a history of coronary artery disease and is post bypass    Patient has a family history of hypertension, coronary artery disease, ulcerative colitis    /82 (BP Location: Left arm, Patient Position: Sitting, Cuff Size: Standard)   Pulse 86   Ht 4' 11 5" (1 511 m)   Wt 64 5 kg (142 lb 3 2 oz)   SpO2 97%   BMI 28 24 kg/m²           Patient Active Problem List   Diagnosis    Essential hypertension    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[address] )    A-fib ([address] )    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[address] )    PAD (peripheral artery disease) ([address] )    Abnormal liver enzymes    Edema of left upper extremity    Urinary retention    Seizure-like activity ([address] )    Hyperlipidemia    Toxic metabolic encephalopathy    UTI (urinary tract infection)    Depression    Current use of long term anticoagulation    Long term current use of amiodarone    Renal artery stenosis ([address] )    Pulmonary hypertension ([address] )    Sick sinus syndrome ([address] )    Hx of CABG    CAD (coronary artery disease)    Memory loss    Mild cognitive impairment    Atherosclerosis with claudication of extremity ([address] )    Carotid stenosis, asymptomatic, bilateral    Acute (reversible) ischemia of small intestine, extent unspecified ([address] )    Cervical disc disorder with radiculopathy of mid-cervical region     Past Medical History:   Diagnosis Date    Anal fissure     Cardiac disorder     Cognitive changes 12/23/2020    Esophageal reflux     Esophagitis, reflux     Hemorrhoids     Hepatic hemangioma     Last Assessed: 1/13/2015    Herpes zoster     History of colonic polyps     Hypertension     Ischemic colitis ([address] )     Lumbar herniated disc     Malignant neoplasm without specification of site ([address] )     Nephrolithiasis     L  Lithotripsy    Nontoxic single thyroid nodule     Last Assessed: 1/13/2015    Osteoarthritis     Overactive bladder     Raynaud disease     Respiratory system disease     Sjogren's disease ([address] )     Spinal stenosis     PONCHO (stress urinary incontinence, female)     Uterovaginal prolapse     Grade I-II     Social History     Socioeconomic History    Marital status: /Civil Union     Spouse name: Not on file    Number of children: Not on file    Years of education: Not on file    Highest education level: Not on file   Occupational History    Occupation: retired   Tobacco Use    Smoking status: Never Smoker    Smokeless tobacco: Never Used   Vaping Use    Vaping Use: Never used   Substance and Sexual Activity    Alcohol use: Not Currently     Alcohol/week: 1 0 standard drink     Types: 1 Glasses of wine per week    Drug use: Never    Sexual activity: Not Currently   Other Topics Concern    Not on file   Social History Narrative    Activities: golf    Caffeine use    Consumes healthy diet    Daily caffeinated coffee consumption: 1 cup per day    Drinks caffeinated tea: 1 cup per day    Well balanced diet     Social Determinants of Health     Financial Resource Strain: Not on file   Food Insecurity: Not on file   Transportation Needs: Not on file   Physical Activity: Not on file   Stress: Not on file   Social Connections: Not on file   Intimate Partner Violence: Not on file   Housing Stability: Not on file      Family History   Problem Relation Age of Onset    Heart disease Mother     Hypertension Mother     Osteoporosis Mother     Heart disease Father     Hypertension Father     Ulcerative colitis Family     Colon polyps Family     No Known Problems Sister     Heart disease Brother     Diabetes Brother     No Known Problems Maternal Grandmother     No Known Problems Maternal Grandfather     No Known Problems Paternal Grandmother     No Known Problems Paternal Grandfather     No Known Problems Son     No Known Problems Daughter      Past Surgical History:   Procedure Laterality Date    APPENDECTOMY  1947    CARDIAC PACEMAKER PLACEMENT  01/2021    CARDIAC SURGERY      CABG    CATARACT EXTRACTION Bilateral     COLONOSCOPY  2012    Fiberoptic    COLONOSCOPY      Resolved: 2006 - 2012 5 year f/u    CORONARY ANGIOPLASTY WITH STENT PLACEMENT      CORONARY ARTERY BYPASS GRAFT      Resolved: 2012    ESOPHAGOGASTRODUODENOSCOPY  2012    Diagnostic    HEMORROIDECTOMY      KNEE SURGERY      LITHOTRIPSY      Renal    MALIGNANT SKIN LESION EXCISION      Face; Resolved: 2004    UT ESOPHAGOGASTRODUODENOSCOPY TRANSORAL DIAGNOSTIC N/A 4/13/2016    Procedure: EGD AND COLONOSCOPY;  Surgeon: Minerva Cummins MD;  [facility]; Service: Gastroenterology    RENAL ARTERY STENT      SKIN LESION EXCISION      Scalp    SOFT TISSUE TUMOR RESECTION      Shoulder; Resolved: 1995    THROMBOLYSIS      Postoperative Thrombolysis PTCA    TONSILLECTOMY      Resolved: 1944       Current Outpatient Medications:     acetaminophen (TYLENOL) 325 mg tablet, Take 2 tablets (650 mg total) by mouth every 6 (six) hours as needed for mild pain, Disp: , Rfl: 0    amiodarone 200 mg tablet, TAKE 1 TABLET (200 MG TOTAL) BY MOUTH DAILY WITH BREAKFAST, Disp: 30 tablet, Rfl: 8    amLODIPine (NORVASC) 5 mg tablet, Take 1 tablet (5 mg total) by mouth daily, Disp: 90 tablet, Rfl: 3    atorvastatin (LIPITOR) 20 mg tablet, TAKE 1 TABLET BY MOUTH EVERY DAY WITH DINNER, Disp: 30 tablet, Rfl: 8    bumetanide (BUMEX) 2 mg tablet, TAKE 1 TABLET BY MOUTH EVERY DAY, Disp: 30 tablet, Rfl: 8    magnesium oxide (MAG-OX) 400 mg, Take 1 tablet (400 mg total) by mouth 2 (two) times a day before lunch and dinner, Disp: , Rfl: 0    metoprolol succinate (TOPROL-XL) 25 mg 24 hr tablet, TAKE 1 TABLET BY MOUTH EVERY DAY, Disp: 30 tablet, Rfl: 8    multivitamin (THERAGRAN) TABS, Take 1 tablet by mouth daily  , Disp: , Rfl:    nitroglycerin (NITROSTAT) 0 4 mg SL tablet, Place 1 tablet (0 4 mg total) under the tongue every 5 (five) minutes as needed for chest pain, Disp: 25 tablet, Rfl: 0    prasugrel (EFFIENT) tablet, Take 1 tablet (10 mg total) by mouth daily, Disp: 90 tablet, Rfl: 3    saccharomyces boulardii (FLORASTOR) 250 mg capsule, Take 1 capsule (250 mg total) by mouth 2 (two) times a day for 7 days, Disp: 14 capsule, Rfl: 0    sertraline (ZOLOFT) 25 mg tablet, TAKE 1 TABLET BY MOUTH EVERY DAY, Disp: 30 tablet, Rfl: 2    Xarelto 15 MG tablet, TAKE 1 TABLET (15 MG TOTAL) BY MOUTH DAILY WITH DINNER, Disp: 30 tablet, Rfl: 8    gabapentin (NEURONTIN) 100 mg capsule, Take 1 tablet at bedtime    May increase to 2 tablets after 3 days (Patient not taking: Reported on 4/12/2022 ), Disp: 60 capsule, Rfl: 1    levETIRAcetam (KEPPRA) 500 mg tablet, Take 1 tablet (500 mg total) by mouth every 12 (twelve) hours (Patient not taking: Reported on 4/12/2022 ), Disp: 60 tablet, Rfl: 3    methocarbamol (ROBAXIN) 500 mg tablet, Take 1 tablet (500 mg total) by mouth 3 (three) times a day (Patient not taking: Reported on 4/12/2022 ), Disp: 30 tablet, Rfl: 1    potassium chloride (K-DUR,KLOR-CON) 20 mEq tablet, Take 2 tablets (40 mEq total) by mouth daily (Patient not taking: Reported on 1/13/2022 ), Disp: 60 tablet, Rfl: 8  Allergies   Allergen Reactions    Sulfa Antibiotics Anaphylaxis    Formaldehyde     Codeine Palpitations       Labs:  Lab Results   Component Value Date     12/24/2015    K 4 0 02/24/2021    K 3 6 12/24/2015     02/24/2021     12/24/2015    CO2 31 02/24/2021    CO2 33 (H) 01/29/2021    BUN 15 02/24/2021    BUN 9 12/24/2015    CREATININE 1 22 02/24/2021    CREATININE 0 77 12/24/2015    GLUCOSE 106 01/29/2021    GLUCOSE 97 12/24/2015    CALCIUM 8 9 02/24/2021    CALCIUM 8 6 12/24/2015     Lab Results   Component Value Date    TROPONINI 0 03 02/24/2021     Lab Results   Component Value Date    WBC 6 18 02/24/2021    WBC 4 91 12/24/2015    HGB 11 9 02/24/2021    HGB 12 1 12/24/2015    HCT 36 7 02/24/2021    HCT 37 7 12/24/2015    MCV 95 02/24/2021    MCV 90 12/24/2015     02/24/2021     12/24/2015     Lab Results   Component Value Date    CHOL 231 07/14/2015    TRIG 88 12/04/2020    TRIG 51 07/14/2015    HDL 62 12/04/2020    HDL 79 07/14/2015    LDLDIRECT 116 (H) 09/22/2020     Imaging:       DEVICE CHECK  2-21-22    MDT-DUAL CHAMBER PPM (DDDR MODE)/ACTIVE SYSTEM IS MRI CONDITIONAL   CARELINK TRANSMISSION: BATTERY VOLTAGE ADEQUATE (9 6 YRS)  AP: 94 9%  : 99 7% (>40%~MVP-OFF~DDDR/70)  ALL AVAILABLE LEAD PARAMETERS WITHIN NORMAL LIMITS  NO SIGNIFICANT HIGH RATE EPISODES  PACEMAKER FUNCTIONING APPROPRIATELY  [facility]       Echocardiogram   January 2021  LEFT VENTRICLE:  Size was normal   Systolic function was at the lower limits of normal  Ejection fraction was estimated to be 50 %  There were no regional wall motion abnormalities  There was severe hypokinesis of the distal septum  Wall thickness was mildly increased  Features were consistent with a pseudonormal left ventricular filling pattern, with concomitant abnormal relaxation and increased filling pressure (grade 2 diastolic dysfunction)      RIGHT VENTRICLE:  The size was normal   Systolic function was normal      LEFT ATRIUM:  The atrium was mildly dilated      MITRAL VALVE:  There was mild to moderate regurgitation      TRICUSPID VALVE:  There was mild regurgitation  Pulmonary artery systolic pressure was markedly increased  The findings suggest severe pulmonary hypertension      IVC, HEPATIC VEINS:  The inferior vena cava was mildly dilated  Respirophasic changes were blunted (less than 50% variation)              Review of Systems:  Review of Systems   All other systems reviewed and are negative  as described in my history of present illness        Physical Exam:  Physical Exam  Vitals reviewed     Constitutional:       Appearance: Normal appearance  She is well-developed  She is not ill-appearing  Comments: Not in any distress at the current time   HENT:      Head: Normocephalic and atraumatic  Right Ear: External ear normal       Left Ear: External ear normal       Nose: Nose normal       Mouth/Throat:      Pharynx: Uvula midline  Eyes:      General: Lids are normal  No scleral icterus  Conjunctiva/sclera: Conjunctivae normal       Comments: No pallor  No cyanosis  No icterus   Neck:      Thyroid: No thyromegaly  Vascular: No carotid bruit or JVD  Trachea: Trachea normal       Comments: No jugular lymphadenopathy  Cardiovascular:      Rate and Rhythm: Normal rate and regular rhythm  Chest Wall: PMI is not displaced  Pulses: Normal pulses  Heart sounds: Normal heart sounds, S1 normal and S2 normal  No murmur heard  No friction rub  No gallop  No S3 or S4 sounds  Pulmonary:      Effort: Pulmonary effort is normal  No accessory muscle usage or respiratory distress  Breath sounds: Normal breath sounds  No decreased breath sounds, wheezing, rhonchi or rales  Chest:      Chest wall: No tenderness  Abdominal:      General: Abdomen is flat  Bowel sounds are normal  There is no distension  Palpations: Abdomen is soft  There is no hepatomegaly, splenomegaly or mass  Tenderness: There is no abdominal tenderness  Musculoskeletal:         General: No swelling, tenderness or deformity  Normal range of motion  Cervical back: Normal range of motion and neck supple  No rigidity  No muscular tenderness  Lymphadenopathy:      Cervical: No cervical adenopathy  Skin:     Coloration: Skin is not jaundiced or pale  Findings: No abrasion, bruising, erythema, lesion or rash  Nails: There is no clubbing  Neurological:      Mental Status: She is alert and oriented to person, place, and time  Mental status is at baseline  Cranial Nerves: No cranial nerve deficit        Comments: Facial symmetry is retained  Extraocular movements are retained  Head neck tongue and palate movement are retained and symmetric   Psychiatric:         Mood and Affect: Mood normal          Speech: Speech normal          Behavior: Behavior normal          Thought Content: Thought content normal          Discussion/Summary:    1  New onset AFib with RVR, paroxysmal  Anticoagulation-chads Vasc post 6-age, gender, CAD, CHF, hypertension-on rivaroxaban  Rate control-metoprolol after device has been placed   Rhythm control - on amiodarone currently, need to follow for toxicity  No history of prior cardioversion or ablation      2  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3  Left bundle branch block at baseline  Patient is post permanent pacemaker  His lead in the septum-RBBB in pacing      4  Moderate pulmonary hypertension   Avoid volume overload and control rate  Continue with bumetanide      5  Essential hypertension  Blood pressure today is 132/80   Patient is currently on metoprolol, bumetanide, amlodipine      6  Heart failure with preserved ejection fraction  Secondary to of RVR   Diuresis and rate control       7  History of CAD post CABG  - prior CABG (LIMA to LAD, SVG to D1, SVG to OM1, SVG to RCA) 2011 / revascularization of LAD in diagonal branch with PCI in late 2011  - now status post LAMINE to proximal LAD and ostial left main on 1/11/2021  - maintained on aspirin, statin, beta blocker, and Effient / also on Ranexa and Imdur      8  Hyperlipidemia  On atorvastatin  Tolerating it without any myositis or myalgia      9  Bilateral renal artery stenosis  Needs to avoid Ace inhibitors or ARB      10  Diarrhea  11  Hypokalemia  Has a history of same  Hypokalemia precipitated the arrhythmia   Routine potassium supplementation with BMP monitoring      12  Episode of seizure   Need to follow up with Neurology to decide on antiepileptic medication use      13   Long-term anticoagulation  PAF   Chads Vasc score 6   Continue with rivaroxaban      14   Amiodarone use  Started in the hospital for AFib with RVR and diastolic heart failure  Follow-up with TSH, CMP, PFT with DLCO, eye evaluation          Summary of my recommendation for this patient   Currently continue with anticoagulation  Reduce amiodarone to 100 mg a day  Follow-up with long-term toxicity for amiodarone-check TSH, CMP, PFT with DLCO, eye evaluation  For antiepileptic medication used follow-up with Neurology  Follow up with general cardiologist at [facility]

## 2022-04-19 NOTE — PROGRESS NOTES
Cardiology Follow Up    Albaro Rios  1940  [id number]  [facility] [place]  [address]  [address]  [phone]    1  Essential hypertension  Ambulatory Referral to Cardiology   2  Hx of CABG  Ambulatory Referral to Cardiology   3  Atrial fibrillation, unspecified type ([address] )     4  Paroxysmal atrial fibrillation [facility])  Ambulatory Referral to Cardiology   5  Coronary artery disease involving other coronary artery bypass graft without angina pectoris  Ambulatory Referral to Cardiology       Interval History:  Patient is here for a follow-up visit  She had CABG x 4 July 2011 with closure of the LIMA to the LAD documented thereafter and revascularization of the LAD and the diagonal branch with PCI  Cardiac catheterization done January 2021 culminated in PCI in the proximal LAD with placement of a LAMINE  Patient as well had PCI of the ostial left main with placement of a LAMINE  SVG's to the first diagonal and first OM be were closed  Graft to the distal RCA had 50% stenosis  Dual chamber pacemaker with deep septal lead to preserve narrow QRS was placed January 2021  Interrogation done in February 2022 demonstrated an appropriately functioning [employer] device  Patient followed by EP and seen April 2022  Remains on low-dose amiodarone related to PAF  Echocardiogram January 2021 demonstrated LVEF of 50% with mild to moderate MR  There was no significant change compared to a prior study done December 2020  Patient has been well  She has had no chest pain or significant dyspnea  She is here today with her       Patient Active Problem List   Diagnosis    Essential hypertension    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)    A-fib (HCC)    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HCC)    PAD (peripheral artery disease) (HCC)    Abnormal liver enzymes    Edema of left upper extremity    Urinary retention    Seizure-like activity ([facility] )    Hyperlipidemia    Toxic metabolic encephalopathy    UTI (urinary tract infection)    Depression    Current use of long term anticoagulation    Long term current use of amiodarone    Renal artery stenosis (HCC)    Pulmonary hypertension (HCC)    Sick sinus syndrome (HCC)    Hx of CABG    CAD (coronary artery disease)    Memory loss    Mild cognitive impairment    Atherosclerosis with claudication of extremity (HCC)    Carotid stenosis, asymptomatic, bilateral    Acute (reversible) ischemia of small intestine, extent unspecified (HCC)    Cervical disc disorder with radiculopathy of mid-cervical region     Past Medical History:   Diagnosis Date    Anal fissure     Cardiac disorder     Cognitive changes 12/23/2020    Esophageal reflux     Esophagitis, reflux     Hemorrhoids     Hepatic hemangioma     Last Assessed: 1/13/2015    Herpes zoster     History of colonic polyps     Hypertension     Ischemic colitis ([facility] )     Lumbar herniated disc     Malignant neoplasm without specification of site ([facility] )     Nephrolithiasis     L   Lithotripsy    Nontoxic single thyroid nodule     Last Assessed: 1/13/2015    Osteoarthritis     Overactive bladder     Raynaud disease     Respiratory system disease     Sjogren's disease ([facility] )     Spinal stenosis     PONCHO (stress urinary incontinence, female)     Uterovaginal prolapse     Grade I-II     Social History     Socioeconomic History    Marital status: /Civil Union     Spouse name: Not on file    Number of children: Not on file    Years of education: Not on file    Highest education level: Not on file   Occupational History    Occupation: retired   Tobacco Use    Smoking status: Never Smoker    Smokeless tobacco: Never Used   Vaping Use    Vaping Use: Never used   Substance and Sexual Activity    Alcohol use: Not Currently     Alcohol/week: 1 0 standard drink     Types: 1 Glasses of wine per week    Drug use: Never    Sexual activity: Not Currently   Other Topics Concern    Not on file   Social History Narrative    Activities: golf    Caffeine use    Consumes healthy diet    Daily caffeinated coffee consumption: 1 cup per day    Drinks caffeinated tea: 1 cup per day    Well balanced diet     Social Determinants of Health     Financial Resource Strain: Not on file   Food Insecurity: Not on file   Transportation Needs: Not on file   Physical Activity: Not on file   Stress: Not on file   Social Connections: Not on file   Intimate Partner Violence: Not on file   Housing Stability: Not on file      Family History   Problem Relation Age of Onset    Heart disease Mother     Hypertension Mother     Osteoporosis Mother     Heart disease Father     Hypertension Father     Ulcerative colitis Family     Colon polyps Family     No Known Problems Sister     Heart disease Brother     Diabetes Brother     No Known Problems Maternal Grandmother     No Known Problems Maternal Grandfather     No Known Problems Paternal Grandmother     No Known Problems Paternal Grandfather     No Known Problems Son     No Known Problems Daughter      Past Surgical History:   Procedure Laterality Date    APPENDECTOMY  1947    CARDIAC PACEMAKER PLACEMENT  01/2021    CARDIAC SURGERY      CABG    CATARACT EXTRACTION Bilateral     COLONOSCOPY  2012    Fiberoptic    COLONOSCOPY      Resolved: 2006 - 2012 5 year f/u    CORONARY ANGIOPLASTY WITH STENT PLACEMENT      CORONARY ARTERY BYPASS GRAFT      Resolved: 2012    ESOPHAGOGASTRODUODENOSCOPY  2012    Diagnostic    HEMORROIDECTOMY      KNEE SURGERY      LITHOTRIPSY      Renal    MALIGNANT SKIN LESION EXCISION      Face; Resolved: 2004    NE ESOPHAGOGASTRODUODENOSCOPY TRANSORAL DIAGNOSTIC N/A 4/13/2016    Procedure: EGD AND COLONOSCOPY;  Surgeon: Jena Jackson MD;  [facility];   Service: Gastroenterology    RENAL ARTERY STENT      SKIN LESION EXCISION      Scalp    SOFT TISSUE TUMOR RESECTION      Shoulder; Resolved: 1995    THROMBOLYSIS      Postoperative Thrombolysis PTCA    TONSILLECTOMY      Resolved: 1944       Current Outpatient Medications:     acetaminophen (TYLENOL) 325 mg tablet, Take 2 tablets (650 mg total) by mouth every 6 (six) hours as needed for mild pain, Disp: , Rfl: 0    amLODIPine (NORVASC) 5 mg tablet, Take 1 tablet (5 mg total) by mouth daily, Disp: 90 tablet, Rfl: 3    atorvastatin (LIPITOR) 20 mg tablet, TAKE 1 TABLET BY MOUTH EVERY DAY WITH DINNER, Disp: 30 tablet, Rfl: 8    bumetanide (BUMEX) 2 mg tablet, TAKE 1 TABLET BY MOUTH EVERY DAY, Disp: 30 tablet, Rfl: 8    magnesium oxide (MAG-OX) 400 mg, Take 1 tablet (400 mg total) by mouth 2 (two) times a day before lunch and dinner, Disp: , Rfl: 0    metoprolol succinate (TOPROL-XL) 25 mg 24 hr tablet, TAKE 1 TABLET BY MOUTH EVERY DAY, Disp: 30 tablet, Rfl: 8    multivitamin (THERAGRAN) TABS, Take 1 tablet by mouth daily  , Disp: , Rfl:     prasugrel (EFFIENT) tablet, TAKE 1 TABLET BY MOUTH EVERY DAY, Disp: 30 tablet, Rfl: 11    Xarelto 15 MG tablet, TAKE 1 TABLET (15 MG TOTAL) BY MOUTH DAILY WITH DINNER, Disp: 30 tablet, Rfl: 8    amiodarone 200 mg tablet, Take 0 5 tablets (100 mg total) by mouth daily with breakfast, Disp: 45 tablet, Rfl: 3    gabapentin (NEURONTIN) 100 mg capsule, Take 1 tablet at bedtime    May increase to 2 tablets after 3 days (Patient not taking: Reported on 4/12/2022 ), Disp: 60 capsule, Rfl: 1    levETIRAcetam (KEPPRA) 500 mg tablet, Take 1 tablet (500 mg total) by mouth every 12 (twelve) hours (Patient not taking: Reported on 4/12/2022 ), Disp: 60 tablet, Rfl: 3    methocarbamol (ROBAXIN) 500 mg tablet, Take 1 tablet (500 mg total) by mouth 3 (three) times a day (Patient not taking: Reported on 4/12/2022 ), Disp: 30 tablet, Rfl: 1    nitroglycerin (NITROSTAT) 0 4 mg SL tablet, Place 1 tablet (0 4 mg total) under the tongue every 5 (five) minutes as needed for chest pain (Patient not taking: Reported on 4/28/2022 ), Disp: 25 tablet, Rfl: 0    potassium chloride (K-DUR,KLOR-CON) 20 mEq tablet, Take 2 tablets (40 mEq total) by mouth daily (Patient not taking: Reported on 1/13/2022 ), Disp: 60 tablet, Rfl: 8    sertraline (ZOLOFT) 25 mg tablet, TAKE 1 TABLET BY MOUTH EVERY DAY (Patient not taking: Reported on 4/28/2022), Disp: 30 tablet, Rfl: 2  Allergies   Allergen Reactions    Sulfa Antibiotics Anaphylaxis    Formaldehyde     Codeine Palpitations       Labs:not applicable  Imaging: XR humerus right    Result Date: 4/5/2022  Narrative: RIGHT HUMERUS INDICATION:   W19  XXXA: Unspecified fall, initial encounter  COMPARISON:  None VIEWS:  XR HUMERUS RIGHT FINDINGS: There is no acute fracture or dislocation  Mineralization noted at the rotator cuff insertion No lytic or blastic osseous lesion  Soft tissues are unremarkable  Impression: No acute osseous abnormality  Workstation performed: [id number]     XR tibia fibula 2 vw right    Result Date: 4/5/2022  Narrative: RIGHT TIBIA AND FIBULA INDICATION:   W19  XXXA: Unspecified fall, initial encounter  COMPARISON:  X-ray 2/4/15 VIEWS:  XR TIBIA FIBULA 2 VW RIGHT FINDINGS: There is no acute fracture or dislocation  Status post medial compartment arthroplasty Moderate osteoarthritis No lytic or blastic osseous lesion  Soft tissues are unremarkable  Impression: No acute osseous abnormality  Workstation performed: [id number]       Review of Systems:  Review of Systems   All other systems reviewed and are negative  Physical Exam:  /62 (BP Location: Left arm, Patient Position: Sitting, Cuff Size: Standard)   Wt 65 2 kg (143 lb 11 2 oz)   BMI 28 54 kg/m²   Physical Exam  Vitals reviewed  Constitutional:       Appearance: She is well-developed  HENT:      Head: Normocephalic and atraumatic     Eyes:      Conjunctiva/sclera: Conjunctivae normal       Pupils: Pupils are equal, round, and reactive to light  Cardiovascular:      Rate and Rhythm: Normal rate  Heart sounds: Normal heart sounds  Pulmonary:      Effort: Pulmonary effort is normal       Breath sounds: Normal breath sounds  Musculoskeletal:      Cervical back: Normal range of motion and neck supple  Skin:     General: Skin is warm and dry  Neurological:      Mental Status: She is alert and oriented to person, place, and time  Discussion/Summary:I will continue the patient's present medical regimen  The patient appears well compensated  I have asked the patient to call if there is a problem in the interim otherwise I will see the patient in six months time

## 2022-04-21 NOTE — TELEPHONE ENCOUNTER
Py called to cancel procedure due to having a cold  Pt was confused when I told her she needed to restart her Effient and Xarelto wasn't sure if she was gholding it or not said her  takes care of all of that  I then spoke to her  who confirmed she had been holding both and would restart them   They will call to reschedule once she is better

## 2022-04-24 DIAGNOSIS — I25.10 CAD (CORONARY ARTERY DISEASE): ICD-10-CM

## 2022-04-25 RX ORDER — PRASUGREL 10 MG/1
TABLET, FILM COATED ORAL
Qty: 30 TABLET | Refills: 11 | Status: SHIPPED | OUTPATIENT
Start: 2022-04-25

## 2022-04-25 NOTE — TELEPHONE ENCOUNTER
Requested medication(s) are due for refill today: Yes  Patient has already received a courtesy refill: No  Other reason request has been forwarded to provider:   Hematology:  Antiplatelets Failed 89/50/1289 10:14 AM   Protocol Details  HGB in normal range and within 180 days    HCT in normal range and within 180 days    PLT in normal range and within 180 days

## 2022-04-28 ENCOUNTER — OFFICE VISIT (OUTPATIENT)
Dept: CARDIOLOGY CLINIC | Facility: CLINIC | Age: 82
End: 2022-04-28
Payer: MEDICARE

## 2022-04-28 ENCOUNTER — IN-CLINIC DEVICE VISIT (OUTPATIENT)
Dept: CARDIOLOGY CLINIC | Facility: CLINIC | Age: 82
End: 2022-04-28
Payer: MEDICARE

## 2022-04-28 VITALS — SYSTOLIC BLOOD PRESSURE: 135 MMHG | DIASTOLIC BLOOD PRESSURE: 62 MMHG | BODY MASS INDEX: 28.54 KG/M2 | WEIGHT: 143.7 LBS

## 2022-04-28 DIAGNOSIS — I25.810 CORONARY ARTERY DISEASE INVOLVING OTHER CORONARY ARTERY BYPASS GRAFT WITHOUT ANGINA PECTORIS: ICD-10-CM

## 2022-04-28 DIAGNOSIS — Z95.1 HX OF CABG: ICD-10-CM

## 2022-04-28 DIAGNOSIS — I48.0 PAROXYSMAL ATRIAL FIBRILLATION (HCC): ICD-10-CM

## 2022-04-28 DIAGNOSIS — I48.91 ATRIAL FIBRILLATION, UNSPECIFIED TYPE (HCC): ICD-10-CM

## 2022-04-28 DIAGNOSIS — Z95.0 CARDIAC PACEMAKER IN SITU: Primary | ICD-10-CM

## 2022-04-28 DIAGNOSIS — I10 ESSENTIAL HYPERTENSION: Primary | ICD-10-CM

## 2022-04-28 PROCEDURE — 93280 PM DEVICE PROGR EVAL DUAL: CPT | Performed by: INTERNAL MEDICINE

## 2022-04-28 PROCEDURE — 99214 OFFICE O/P EST MOD 30 MIN: CPT | Performed by: INTERNAL MEDICINE

## 2022-04-28 NOTE — PROGRESS NOTES
Results for orders placed or performed in visit on 04/28/22   Cardiac EP device report    Narrative    MDT-DUAL CHAMBER PPM (DDDR MODE)/ACTIVE SYSTEM IS MRI CONDITIONAL  DEVICE INTERROGATED IN THE [facility] OFFICE  BATTERY VOLTAGE ADEQUATE (9 6 YRS)  AP-95%, >99% (>40% [email or web address] OFF)  ALL LEAD PARAMETERS WITHIN NORMAL LIMITS  NO SIGNIFICANT HIGH RATE EPISODES  PT TO SEE DR Iron Gonzáles  NORMAL DEVICE FUNCTION   GV

## 2022-04-29 NOTE — TELEPHONE ENCOUNTER
Patient's  called left message to return call, no other info given    Returned call left message to return my call

## 2022-05-02 ENCOUNTER — TELEPHONE (OUTPATIENT)
Dept: PAIN MEDICINE | Facility: CLINIC | Age: 82
End: 2022-05-02

## 2022-05-02 NOTE — TELEPHONE ENCOUNTER
This patient is being considered for a neuraxial injection for the management of their pain  However, the patient is currently on an anticoagulant per your orders  The [employer] Society [employer] Guideline on neuraxial procedures and anti-coagulation indicates that medication should be held as follows: Xarelto 3 days and Effient 7 days prior to injection  Please advise if you ok the hold of this medication    Thanks

## 2022-05-02 NOTE — TELEPHONE ENCOUNTER
Ekta Alvarado * spouse * called in to reschedule the patients procedure   Please be advised thank you    Pt can be reached @ [phone]

## 2022-05-05 NOTE — TELEPHONE ENCOUNTER
Scheduled pt for [facility] for 5/24/22  Pt is takinf Efient and was instructed to hold for 7 days with last dose on 5/16/22 and xarelto for 3 days with last dose on 5/20/22  Went over pre-procedure instructions below:  Nothing to eat or drink 1 hr prior to procedure  Need to arrange transportation  Proper clothing for procedure  If ill or placed on antibiotics please call to reschedule  Vaccine instructions

## 2022-05-14 DIAGNOSIS — M50.120 CERVICAL DISC DISORDER WITH RADICULOPATHY OF MID-CERVICAL REGION: ICD-10-CM

## 2022-05-16 NOTE — TELEPHONE ENCOUNTER
Patient states that she doesn't recognize the medication that I asked about. Patient states that she will get her medications off the top of her refrigerator and call us back. Please ask if patient is taking the Gabapentin and if it is working for her.

## 2022-05-24 ENCOUNTER — HOSPITAL ENCOUNTER (OUTPATIENT)
Dept: RADIOLOGY | Facility: CLINIC | Age: 82
Discharge: HOME | End: 2022-05-24
Attending: ANESTHESIOLOGY
Payer: MEDICARE

## 2022-05-24 VITALS
SYSTOLIC BLOOD PRESSURE: 116 MMHG | OXYGEN SATURATION: 94 % | HEART RATE: 73 BPM | TEMPERATURE: 97.8 F | DIASTOLIC BLOOD PRESSURE: 76 MMHG | RESPIRATION RATE: 20 BRPM

## 2022-05-24 DIAGNOSIS — M50.120 CERVICAL DISC DISORDER WITH RADICULOPATHY OF MID-CERVICAL REGION: ICD-10-CM

## 2022-05-24 PROCEDURE — 62321 NJX INTERLAMINAR CRV/THRC: CPT | Performed by: ANESTHESIOLOGY

## 2022-05-24 PROCEDURE — A9585 GADOBUTROL INJECTION: HCPCS | Performed by: ANESTHESIOLOGY

## 2022-05-24 RX ORDER — METHYLPREDNISOLONE ACETATE 80 MG/ML
80 INJECTION, SUSPENSION INTRA-ARTICULAR; INTRALESIONAL; INTRAMUSCULAR; PARENTERAL; SOFT TISSUE ONCE
Status: COMPLETED | OUTPATIENT
Start: 2022-05-24 | End: 2022-05-24

## 2022-05-24 RX ADMIN — GADOBUTROL 1 ML: 604.72 INJECTION INTRAVENOUS at 13:29

## 2022-05-24 RX ADMIN — METHYLPREDNISOLONE ACETATE 80 MG: 80 INJECTION, SUSPENSION INTRA-ARTICULAR; INTRALESIONAL; INTRAMUSCULAR; PARENTERAL; SOFT TISSUE at 13:29

## 2022-05-24 NOTE — DISCHARGE INSTR - LAB
Epidural Steroid Injection   WHAT YOU NEED TO KNOW:   An epidural steroid injection (RADHA) is a procedure to inject steroid medicine into the epidural space  The epidural space is between your spinal cord and vertebrae  Steroids reduce inflammation and fluid buildup in your spine that may be causing pain  You may be given pain medicine along with the steroids  ACTIVITY  Do not drive or operate machinery today  No strenuous activity today - bending, lifting, etc   You may resume normal activites starting tomorrow - start slowly and as tolerated  You may shower today, but no tub baths or hot tubs  You may have numbness for several hours from the local anesthetic  Please use caution and common sense, especially with weight-bearing activities  CARE OF THE INJECTION SITE  If you have soreness or pain, apply ice to the area today (20 minutes on/20 minutes off)  Starting tomorrow, you may use warm, moist heat or ice if needed  You may have an increase or change in your discomfort for 36-48 hours after your treatment  Apply ice and continue with any pain medication you have been prescribed  Notify the [facility] if you have any of the following: redness, drainage, swelling, headache, stiff neck or fever above 100°F     SPECIAL INSTRUCTIONS  Our office will contact you in approximately 7 days for a progress report  MEDICATIONS  Continue to take all routine medications  Our office may have instructed you to hold some medications  As no general anesthesia was used in today's procedure, you should not experience any side effects related to anesthesia  If you have a problem specifically related to your procedure, please call our office at [phone]  Problems not related to your procedure should be directed to your primary care physician

## 2022-05-24 NOTE — H&P
History of Present Illness: The patient is a 79 y o  female who presents with complaints of neck pain is here today for cervical epidural steroid injection    Patient Active Problem List   Diagnosis    Essential hypertension    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[address] )    A-fib ([address] )    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[address] )    PAD (peripheral artery disease) ([address] )    Abnormal liver enzymes    Edema of left upper extremity    Urinary retention    Seizure-like activity ([address] )    Hyperlipidemia    Toxic metabolic encephalopathy    UTI (urinary tract infection)    Depression    Current use of long term anticoagulation    Long term current use of amiodarone    Renal artery stenosis (HCC)    Pulmonary hypertension ([address] )    Sick sinus syndrome ([address] )    Hx of CABG    CAD (coronary artery disease)    Memory loss    Mild cognitive impairment    Atherosclerosis with claudication of extremity ([address] )    Carotid stenosis, asymptomatic, bilateral    Acute (reversible) ischemia of small intestine, extent unspecified ([address] )    Cervical disc disorder with radiculopathy of mid-cervical region       Past Medical History:   Diagnosis Date    Anal fissure     Cardiac disorder     Cognitive changes 12/23/2020    Esophageal reflux     Esophagitis, reflux     Hemorrhoids     Hepatic hemangioma     Last Assessed: 1/13/2015    Herpes zoster     History of colonic polyps     Hypertension     Ischemic colitis ([address] )     Lumbar herniated disc     Malignant neoplasm without specification of site ([address] )     Nephrolithiasis     L   Lithotripsy    Nontoxic single thyroid nodule     Last Assessed: 1/13/2015    Osteoarthritis     Overactive bladder     Raynaud disease     Respiratory system disease     Sjogren's disease ([address] )     Spinal stenosis     PONCHO (stress urinary incontinence, female)     Uterovaginal prolapse     Grade I-II       Past Surgical History:   Procedure Laterality Date    APPENDECTOMY  1947    CARDIAC PACEMAKER PLACEMENT  01/2021    CARDIAC SURGERY      CABG    CATARACT EXTRACTION Bilateral     COLONOSCOPY  2012    Fiberoptic    COLONOSCOPY      Resolved: 2006 - 2012 5 year f/u    CORONARY ANGIOPLASTY WITH STENT PLACEMENT      CORONARY ARTERY BYPASS GRAFT      Resolved: 2012    ESOPHAGOGASTRODUODENOSCOPY  2012    Diagnostic    HEMORROIDECTOMY      KNEE SURGERY      LITHOTRIPSY      Renal    MALIGNANT SKIN LESION EXCISION      Face; Resolved: 2004    IA ESOPHAGOGASTRODUODENOSCOPY TRANSORAL DIAGNOSTIC N/A 4/13/2016    Procedure: EGD AND COLONOSCOPY;  Surgeon: Bernardino Vitale MD;  [facility]; Service: Gastroenterology    RENAL ARTERY STENT      SKIN LESION EXCISION      Scalp    SOFT TISSUE TUMOR RESECTION      Shoulder; Resolved: 1995    THROMBOLYSIS      Postoperative Thrombolysis PTCA    TONSILLECTOMY      Resolved: 1944         Current Outpatient Medications:     acetaminophen (TYLENOL) 325 mg tablet, Take 2 tablets (650 mg total) by mouth every 6 (six) hours as needed for mild pain, Disp: , Rfl: 0    amiodarone 200 mg tablet, Take 0 5 tablets (100 mg total) by mouth daily with breakfast, Disp: 45 tablet, Rfl: 3    amLODIPine (NORVASC) 5 mg tablet, Take 1 tablet (5 mg total) by mouth daily, Disp: 90 tablet, Rfl: 3    atorvastatin (LIPITOR) 20 mg tablet, TAKE 1 TABLET BY MOUTH EVERY DAY WITH DINNER, Disp: 30 tablet, Rfl: 8    bumetanide (BUMEX) 2 mg tablet, TAKE 1 TABLET BY MOUTH EVERY DAY, Disp: 30 tablet, Rfl: 8    gabapentin (NEURONTIN) 100 mg capsule, Take 1 tablet at bedtime    May increase to 2 tablets after 3 days (Patient not taking: Reported on 4/12/2022 ), Disp: 60 capsule, Rfl: 1    levETIRAcetam (KEPPRA) 500 mg tablet, Take 1 tablet (500 mg total) by mouth every 12 (twelve) hours (Patient not taking: Reported on 4/12/2022 ), Disp: 60 tablet, Rfl: 3    magnesium oxide (MAG-OX) 400 mg, Take 1 tablet (400 mg total) by mouth 2 (two) times a day before lunch and dinner, Disp: , Rfl: 0    methocarbamol (ROBAXIN) 500 mg tablet, Take 1 tablet (500 mg total) by mouth 3 (three) times a day (Patient not taking: Reported on 4/12/2022 ), Disp: 30 tablet, Rfl: 1    metoprolol succinate (TOPROL-XL) 25 mg 24 hr tablet, TAKE 1 TABLET BY MOUTH EVERY DAY, Disp: 30 tablet, Rfl: 8    multivitamin (THERAGRAN) TABS, Take 1 tablet by mouth daily  , Disp: , Rfl:     nitroglycerin (NITROSTAT) 0 4 mg SL tablet, Place 1 tablet (0 4 mg total) under the tongue every 5 (five) minutes as needed for chest pain (Patient not taking: Reported on 4/28/2022 ), Disp: 25 tablet, Rfl: 0    potassium chloride (K-DUR,KLOR-CON) 20 mEq tablet, Take 2 tablets (40 mEq total) by mouth daily (Patient not taking: Reported on 1/13/2022 ), Disp: 60 tablet, Rfl: 8    prasugrel (EFFIENT) tablet, TAKE 1 TABLET BY MOUTH EVERY DAY, Disp: 30 tablet, Rfl: 11    sertraline (ZOLOFT) 25 mg tablet, TAKE 1 TABLET BY MOUTH EVERY DAY (Patient not taking: Reported on 4/28/2022), Disp: 30 tablet, Rfl: 2    Xarelto 15 MG tablet, TAKE 1 TABLET (15 MG TOTAL) BY MOUTH DAILY WITH DINNER, Disp: 30 tablet, Rfl: 8    Current Facility-Administered Medications:     methylPREDNISolone acetate (DEPO-MEDROL) injection 80 mg, 80 mg, Epidural, Once, Leighann Magallanes MD    Allergies   Allergen Reactions    Sulfa Antibiotics Anaphylaxis    Formaldehyde     Codeine Palpitations       Physical Exam:   Vitals:    05/24/22 1303   BP: 119/82   Pulse: 74   Resp: 20   Temp: 97 8 °F (36 6 °C)   SpO2: 91%     General: Awake, Alert, Oriented x 3, Mood and affect appropriate  Respiratory: Respirations even and unlabored  Cardiovascular: Peripheral pulses intact; no edema  Musculoskeletal Exam:  Neck pain    ASA Score: 3    Patient/Chart Verification  Patient ID Verified: Verbal  Consents Confirmed: To be obtained in the Pre-Procedure area  Interval H&P(within 24 hr) Complete (required for Outpatients and Surgery Admit only):  To be obtained in the Pre-Procedure area  Allergies Reviewed: Yes  Currently on antibiotics?: No    Assessment:   1   Cervical disc disorder with radiculopathy of mid-cervical region        Plan: BEVERLEY

## 2022-05-31 ENCOUNTER — TELEPHONE (OUTPATIENT)
Dept: PAIN MEDICINE | Facility: CLINIC | Age: 82
End: 2022-05-31

## 2022-06-16 ENCOUNTER — HOSPITAL ENCOUNTER (OUTPATIENT)
Dept: NON INVASIVE DIAGNOSTICS | Facility: CLINIC | Age: 82
Discharge: HOME | End: 2022-06-16
Payer: MEDICARE

## 2022-06-16 DIAGNOSIS — I70.219 ATHEROSCLEROSIS WITH CLAUDICATION OF EXTREMITY (HCC): ICD-10-CM

## 2022-06-16 DIAGNOSIS — F41.8 DEPRESSION WITH ANXIETY: ICD-10-CM

## 2022-06-16 DIAGNOSIS — I65.23 CAROTID STENOSIS, ASYMPTOMATIC, BILATERAL: ICD-10-CM

## 2022-06-16 PROCEDURE — 93922 UPR/L XTREMITY ART 2 LEVELS: CPT | Performed by: SURGERY

## 2022-06-16 PROCEDURE — 93925 LOWER EXTREMITY STUDY: CPT

## 2022-06-16 PROCEDURE — 93880 EXTRACRANIAL BILAT STUDY: CPT | Performed by: SURGERY

## 2022-06-16 PROCEDURE — 93925 LOWER EXTREMITY STUDY: CPT | Performed by: SURGERY

## 2022-06-16 PROCEDURE — 93880 EXTRACRANIAL BILAT STUDY: CPT

## 2022-06-16 PROCEDURE — 93923 UPR/LXTR ART STDY 3+ LVLS: CPT

## 2022-06-16 RX ORDER — SERTRALINE HYDROCHLORIDE 25 MG/1
TABLET, FILM COATED ORAL
Qty: 30 TABLET | Refills: 2 | Status: SHIPPED | OUTPATIENT
Start: 2022-06-16

## 2022-06-17 ENCOUNTER — TRANSCRIBE ORDERS (OUTPATIENT)
Dept: ADMINISTRATIVE | Facility: HOSPITAL | Age: 82
End: 2022-06-17

## 2022-06-17 DIAGNOSIS — I73.9 PAD (PERIPHERAL ARTERY DISEASE) (HCC): Primary | ICD-10-CM

## 2022-07-08 NOTE — TELEPHONE ENCOUNTER
RN s/w pt but she wasn't able to answer my question about her pain  She allowed permission for me to s/w her   Ronal Nye said she had relief for a couple of weeks and then the pain returned  She has left sided neck pain which is worse with lying down  She has numbness and tingling of the left arm down to the hand  Ronal Nye asking what next step is since the 2 inj didn't help very long  Last BEVERLEY was 5/24/22  Pls advise   asking to only s/w him when we c/b with recommendation as his wife has some dementia

## 2022-07-08 NOTE — TELEPHONE ENCOUNTER
Pt's spouse called in to let the doctor know that the pain has returned  And what would be the next plan of action  Please be advised thank you    Pt can be reached @ [phone]

## 2022-07-14 ENCOUNTER — OFFICE VISIT (OUTPATIENT)
Dept: FAMILY MEDICINE CLINIC | Facility: MEDICAL CENTER | Age: 82
End: 2022-07-14
Payer: MEDICARE

## 2022-07-14 VITALS
DIASTOLIC BLOOD PRESSURE: 78 MMHG | HEART RATE: 84 BPM | HEIGHT: 60 IN | WEIGHT: 146.8 LBS | SYSTOLIC BLOOD PRESSURE: 138 MMHG | OXYGEN SATURATION: 94 % | BODY MASS INDEX: 28.82 KG/M2

## 2022-07-14 DIAGNOSIS — Z82.49 FAMILY HISTORY OF ABDOMINAL AORTIC ANEURYSM (AAA): ICD-10-CM

## 2022-07-14 DIAGNOSIS — Z12.11 SCREEN FOR COLON CANCER: ICD-10-CM

## 2022-07-14 DIAGNOSIS — Z13.1 SCREENING FOR DIABETES MELLITUS: ICD-10-CM

## 2022-07-14 DIAGNOSIS — Z13.6 SCREENING FOR AAA (ABDOMINAL AORTIC ANEURYSM): ICD-10-CM

## 2022-07-14 DIAGNOSIS — Z78.0 ASYMPTOMATIC POSTMENOPAUSAL STATE: ICD-10-CM

## 2022-07-14 DIAGNOSIS — Z11.59 NEED FOR HEPATITIS C SCREENING TEST: ICD-10-CM

## 2022-07-14 DIAGNOSIS — N18.30 STAGE 3 CHRONIC KIDNEY DISEASE, UNSPECIFIED WHETHER STAGE 3A OR 3B CKD (HCC): ICD-10-CM

## 2022-07-14 DIAGNOSIS — Z00.00 MEDICARE ANNUAL WELLNESS VISIT, SUBSEQUENT: Primary | ICD-10-CM

## 2022-07-14 PROCEDURE — G0439 PPPS, SUBSEQ VISIT: HCPCS | Performed by: STUDENT IN AN ORGANIZED HEALTH CARE EDUCATION/TRAINING PROGRAM

## 2022-07-14 NOTE — PATIENT INSTRUCTIONS
Medicare Preventive Visit Patient Instructions  Thank you for completing your Welcome to Medicare Visit or Medicare Annual Wellness Visit today  Your next wellness visit will be due in one year (7/15/2023)  The screening/preventive services that you may require over the next 5-10 years are detailed below  Some tests may not apply to you based off risk factors and/or age  Screening tests ordered at today's visit but not completed yet may show as past due  Also, please note that scanned in results may not display below  Preventive Screenings:  Service Recommendations Previous Testing/Comments   Colorectal Cancer Screening  * Colonoscopy    * Fecal Occult Blood Test (FOBT)/Fecal Immunochemical Test (FIT)  * Fecal DNA/Cologuard Test  * Flexible Sigmoidoscopy Age: 54-65 years old   Colonoscopy: every 10 years (may be performed more frequently if at higher risk)  OR  FOBT/FIT: every 1 year  OR  Cologuard: every 3 years  OR  Sigmoidoscopy: every 5 years  Screening may be recommended earlier than age 48 if at higher risk for colorectal cancer  Also, an individualized decision between you and your healthcare provider will decide whether screening between the ages of 74-80 would be appropriate  Colonoscopy: 06/11/2012  FOBT/FIT: 01/25/2021  Cologuard: Not on file  Sigmoidoscopy: Not on file          Breast Cancer Screening Age: 36 years old  Frequency: every 1-2 years  Not required if history of left and right mastectomy Mammogram: 06/01/2015        Cervical Cancer Screening Between the ages of 21-29, pap smear recommended once every 3 years  Between the ages of 33-67, can perform pap smear with HPV co-testing every 5 years     Recommendations may differ for women with a history of total hysterectomy, cervical cancer, or abnormal pap smears in past  Pap Smear: Not on file    Screening Not Indicated   Hepatitis C Screening Once for adults born between [facility]  More frequently in patients at high risk for Hepatitis C Hep C Antibody: Not on file        Diabetes Screening 1-2 times per year if you're at risk for diabetes or have pre-diabetes Fasting glucose: 81 mg/dL   A1C: 5 5 %        Cholesterol Screening Once every 5 years if you don't have a lipid disorder  May order more often based on risk factors  Lipid panel: 12/04/2020    Screening Not Indicated  History Lipid Disorder     Other Preventive Screenings Covered by Medicare:  Abdominal Aortic Aneurysm (AAA) Screening: covered once if your at risk  You're considered to be at risk if you have a family history of AAA  Lung Cancer Screening: covers low dose CT scan once per year if you meet all of the following conditions: (1) Age 50-69; (2) No signs or symptoms of lung cancer; (3) Current smoker or have quit smoking within the last 15 years; (4) You have a tobacco smoking history of at least 30 pack years (packs per day multiplied by number of years you smoked); (5) You get a written order from a healthcare provider  Glaucoma Screening: covered annually if you're considered high risk: (1) You have diabetes OR (2) Family history of glaucoma OR (3)  aged 48 and older OR (3)  American aged 72 and older  Osteoporosis Screening: covered every 2 years if you meet one of the following conditions: (1) You're estrogen deficient and at risk for osteoporosis based off medical history and other findings; (2) Have a vertebral abnormality; (3) On glucocorticoid therapy for more than 3 months; (4) Have primary hyperparathyroidism; (5) On osteoporosis medications and need to assess response to drug therapy  Last bone density test (DXA Scan): 06/01/2015  HIV Screening: covered annually if you're between the age of 12-76  Also covered annually if you are younger than 13 and older than 72 with risk factors for HIV infection  For pregnant patients, it is covered up to 3 times per pregnancy      Immunizations:  Immunization Recommendations   Influenza Vaccine Annual influenza vaccination during flu season is recommended for all persons aged >= 6 months who do not have contraindications   Pneumococcal Vaccine (Prevnar and Pneumovax)  * Prevnar = PCV13  * Pneumovax = PPSV23   Adults 25-60 years old: 1-3 doses may be recommended based on certain risk factors  Adults 72 years old: Prevnar (PCV13) vaccine recommended followed by Pneumovax (PPSV23) vaccine  If already received PPSV23 since turning 65, then PCV13 recommended at least one year after PPSV23 dose  Hepatitis B Vaccine 3 dose series if at intermediate or high risk (ex: diabetes, end stage renal disease, liver disease)   Tetanus (Td) Vaccine - COST NOT COVERED BY MEDICARE PART B Following completion of primary series, a booster dose should be given every 10 years to maintain immunity against tetanus  Td may also be given as tetanus wound prophylaxis  Tdap Vaccine - COST NOT COVERED BY MEDICARE PART B Recommended at least once for all adults  For pregnant patients, recommended with each pregnancy  Shingles Vaccine (Shingrix) - COST NOT COVERED BY MEDICARE PART B  2 shot series recommended in those aged 48 and above     Health Maintenance Due:      Topic Date Due    Breast Cancer Screening: Mammogram  06/01/2017     Immunizations Due:      Topic Date Due    COVID-19 Vaccine (2 - Moderna series) 04/27/2021    Influenza Vaccine (1) 09/01/2022     Advance Directives   What are advance directives? Advance directives are legal documents that state your wishes and plans for medical care  These plans are made ahead of time in case you lose your ability to make decisions for yourself  Advance directives can apply to any medical decision, such as the treatments you want, and if you want to donate organs  What are the types of advance directives? There are many types of advance directives, and each state has rules about how to use them  You may choose a combination of any of the following:  Living will:   This is a written record of the treatment you want  You can also choose which treatments you do not want, which to limit, and which to stop at a certain time  This includes surgery, medicine, IV fluid, and tube feedings  [facility] power of  for healthcare [facility]): This is a written record that states who you want to make healthcare choices for you when you are unable to make them for yourself  This person, called a proxy, is usually a family member or a friend  You may choose more than 1 proxy  Do not resuscitate (DNR) order:  A DNR order is used in case your heart stops beating or you stop breathing  It is a request not to have certain forms of treatment, such as CPR  A DNR order may be included in other types of advance directives  Medical directive: This covers the care that you want if you are in a coma, near death, or unable to make decisions for yourself  You can list the treatments you want for each condition  Treatment may include pain medicine, surgery, blood transfusions, dialysis, IV or tube feedings, and a ventilator (breathing machine)  Values history: This document has questions about your views, beliefs, and how you feel and think about life  This information can help others choose the care that you would choose  Why are advance directives important? An advance directive helps you control your care  Although spoken wishes may be used, it is better to have your wishes written down  Spoken wishes can be misunderstood, or not followed  Treatments may be given even if you do not want them  An advance directive may make it easier for your family to make difficult choices about your care  Weight Management   Why it is important to manage your weight:  Being overweight increases your risk of health conditions such as heart disease, high blood pressure, type 2 diabetes, and certain types of cancer  It can also increase your risk for osteoarthritis, sleep apnea, and other respiratory problems   Aim for a slow, steady weight loss  Even a small amount of weight loss can lower your risk of health problems  How to lose weight safely:  A safe and healthy way to lose weight is to eat fewer calories and get regular exercise  You can lose up about 1 pound a week by decreasing the number of calories you eat by 500 calories each day  Healthy meal plan for weight management:  A healthy meal plan includes a variety of foods, contains fewer calories, and helps you stay healthy  A healthy meal plan includes the following:  Eat whole-grain foods more often  A healthy meal plan should contain fiber  Fiber is the part of grains, fruits, and vegetables that is not broken down by your body  Whole-grain foods are healthy and provide extra fiber in your diet  Some examples of whole-grain foods are whole-wheat breads and pastas, oatmeal, brown rice, and bulgur  Eat a variety of vegetables every day  Include dark, leafy greens such as spinach, kale, roldan greens, and mustard greens  Eat yellow and orange vegetables such as carrots, sweet potatoes, and winter squash  Eat a variety of fruits every day  Choose fresh or canned fruit (canned in its own juice or light syrup) instead of juice  Fruit juice has very little or no fiber  Eat low-fat dairy foods  Drink fat-free (skim) milk or 1% milk  Eat fat-free yogurt and low-fat cottage cheese  Try low-fat cheeses such as mozzarella and other reduced-fat cheeses  Choose meat and other protein foods that are low in fat  Choose beans or other legumes such as split peas or lentils  Choose fish, skinless poultry (chicken or turkey), or lean cuts of red meat (beef or pork)  Before you cook meat or poultry, cut off any visible fat  Use less fat and oil  Try baking foods instead of frying them  Add less fat, such as margarine, sour cream, regular salad dressing and mayonnaise to foods  Eat fewer high-fat foods   Some examples of high-fat foods include french fries, doughnuts, ice cream, and cakes  Eat fewer sweets  Limit foods and drinks that are high in sugar  This includes candy, cookies, regular soda, and sweetened drinks  Exercise:  Exercise at least 30 minutes per day on most days of the week  Some examples of exercise include walking, biking, dancing, and swimming  You can also fit in more physical activity by taking the stairs instead of the elevator or parking farther away from stores  Ask your healthcare provider about the best exercise plan for you  © Copyright [employer] 2018 Information is for End User's use only and may not be sold, redistributed or otherwise used for commercial purposes  All illustrations and images included in CareNotes® are the copyrighted property of [employer]  or [facility] Prevention   WHAT YOU NEED TO KNOW:   Fall prevention includes ways to make your home and other areas safer  It also includes ways you can move more carefully to prevent a fall  Health conditions that cause changes in your blood pressure, vision, or muscle strength and coordination may increase your risk for falls  Medicines may also increase your risk for falls if they make you dizzy, weak, or sleepy  DISCHARGE INSTRUCTIONS:   Call 911 or have someone else call if:   You have fallen and are unconscious  You have fallen and cannot move part of your body  Contact your healthcare provider if:   You have fallen and have pain or a headache  You have questions or concerns about your condition or care  Fall prevention tips:   Stand or sit up slowly  This may help you keep your balance and prevent falls  Use assistive devices as directed  Your healthcare provider may suggest that you use a cane or walker to help you keep your balance  You may need to have grab bars put in your bathroom near the toilet or in the shower  Wear shoes that fit well and have soles that   Wear shoes both inside and outside  Use slippers with good    Do not wear shoes with high heels  Wear a personal alarm  This is a device that allows you to call 911 if you fall and need help  Ask your healthcare provider for more information  Stay active  Exercise can help strengthen your muscles and improve your balance  Your healthcare provider may recommend water aerobics or walking  He or she may also recommend physical therapy to improve your coordination  Never start an exercise program without talking to your healthcare provider first          Manage your medical conditions  Keep all appointments with your healthcare providers  Visit your eye doctor as directed  Home safety tips: Add items to prevent falls in the bathroom  Put nonslip strips on your bath or shower floor to prevent you from slipping  Use a bath mat if you do not have carpet in the bathroom  This will prevent you from falling when you step out of the bath or shower  Use a shower seat so you do not need to stand while you shower  Sit on the toilet or a chair in your bathroom to dry yourself and put on clothing  This will prevent you from losing your balance from drying or dressing yourself while you are standing  Keep paths clear  Remove books, shoes, and other objects from walkways and stairs  Place cords for telephones and lamps out of the way so that you do not need to walk over them  Tape them down if you cannot move them  Remove small rugs  If you cannot remove a rug, secure it with double-sided tape  This will prevent you from tripping  Install bright lights in your home  Use night lights to help light paths to the bathroom or kitchen  Always turn on the light before you start walking  Keep items you use often on shelves within reach  Do not use a step stool to help you reach an item  Paint or place reflective tape on the edges of your stairs  This will help you see the stairs better      Follow up with your doctor as directed:  Write down your questions so you remember to ask them during your visits  © Copyright [employer] 2022 Information is for End User's use only and may not be sold, redistributed or otherwise used for commercial purposes  All illustrations and images included in CareNotes® are the copyrighted property of [employer]  or Annie Wade  The above information is an  only  It is not intended as medical advice for individual conditions or treatments  Talk to your doctor, nurse or pharmacist before following any medical regimen to see if it is safe and effective for you

## 2022-07-15 ENCOUNTER — TELEPHONE (OUTPATIENT)
Dept: NEPHROLOGY | Facility: CLINIC | Age: 82
End: 2022-07-15

## 2022-07-15 NOTE — TELEPHONE ENCOUNTER
Called pt to schedule consult - patient declined scheduling at this time, stating her kidneys are fine  I informed the patient she was referred by Dr Beryl Edwards for management of her CKD  Patient still declined scheduling and said she will continue to see PCP for management  Told pt to call us back if anything changes

## 2022-08-03 ENCOUNTER — REMOTE DEVICE CLINIC VISIT (OUTPATIENT)
Dept: CARDIOLOGY CLINIC | Facility: CLINIC | Age: 82
End: 2022-08-03
Payer: MEDICARE

## 2022-08-03 DIAGNOSIS — Z95.0 PRESENCE OF PERMANENT CARDIAC PACEMAKER: Primary | ICD-10-CM

## 2022-08-03 PROCEDURE — 93294 REM INTERROG EVL PM/LDLS PM: CPT | Performed by: INTERNAL MEDICINE

## 2022-08-03 PROCEDURE — 93296 REM INTERROG EVL PM/IDS: CPT | Performed by: INTERNAL MEDICINE

## 2022-08-03 NOTE — PROGRESS NOTES
MDT-DUAL CHAMBER PPM (DDDR MODE)/ACTIVE SYSTEM IS MRI CONDITIONAL   CARELINK TRANSMISSION:  BATTERY VOLTAGE ADEQUATE (9 0 YR )   AP 93 9%  99 5% (>40%/DDDR 70 PPM, AVD 50-80 MS, HIS)   ALL LEAD PARAMETERS WITHIN NORMAL LIMITS   NO SIGNIFICANT HIGH RATE EPISODES   NORMAL DEVICE FUNCTION   RG

## 2022-08-05 ENCOUNTER — OFFICE VISIT (OUTPATIENT)
Dept: PAIN MEDICINE | Facility: CLINIC | Age: 82
End: 2022-08-05
Payer: MEDICARE

## 2022-08-05 VITALS
HEART RATE: 78 BPM | BODY MASS INDEX: 32.86 KG/M2 | WEIGHT: 142 LBS | DIASTOLIC BLOOD PRESSURE: 71 MMHG | SYSTOLIC BLOOD PRESSURE: 128 MMHG | HEIGHT: 55 IN

## 2022-08-05 DIAGNOSIS — G89.4 CHRONIC PAIN SYNDROME: Primary | ICD-10-CM

## 2022-08-05 DIAGNOSIS — M47.816 LUMBAR SPONDYLOSIS: ICD-10-CM

## 2022-08-05 DIAGNOSIS — M50.120 CERVICAL DISC DISORDER WITH RADICULOPATHY OF MID-CERVICAL REGION: ICD-10-CM

## 2022-08-05 DIAGNOSIS — M54.2 NECK PAIN: ICD-10-CM

## 2022-08-05 PROCEDURE — 99214 OFFICE O/P EST MOD 30 MIN: CPT

## 2022-08-05 NOTE — PROGRESS NOTES
Assessment:  1  Chronic pain syndrome    2  Lumbar spondylosis    3  Cervical disc disorder with radiculopathy of mid-cervical region    4  Neck pain        Plan:  The patient is an 58-year-old female with a history of chronic pain secondary to neck pain, cervical disc disorder with radiculopathy, cervical spinal stenosis and myofascial pain who presents to the office with ongoing bilateral neck pain, left worse than right that is unchanged since her last office visit  The patient has undergone cervical epidural steroid injections twice with relief of her neck pain symptoms for 1 week  The patients pain appears facet mediated on examination today  To help with the back pain, occurring from facet arthropathy,  we discussed medial branch block/radiofrequency ablation  This is a 2 step process, where the patient would first undergo a diagnostic test called a medial branch block injection  If successful, the  medial branch block injection  would provide 50% or more pain relief for a few hours  after the procedure  The next step would be a  radiofrequency ablation  Radiofrequency ablation decreases  pain by creating a nerve lesion to interrupt the pain signals, and weaken the pain perceived by the brain  This procedure typically provides 1-2 years of pain relief  The risks, including bleeding, infection, and tissue reaction were reviewed with the patient, and was scheduled for a bilateral C3-C5 medial branch block injection  She can continue taking Tylenol as needed for pain  My impressions and treatment recommendations were discussed in detail with the patient who verbalized understanding and had no further questions  Discharge instructions were provided  I personally saw and examined the patient and I agree with the above discussed plan of care      Orders Placed This Encounter   Procedures    [place] spine and pain procedure     Dr Soumya Almonte     Standing Status:   Future     Standing Expiration Date: 8/5/2026     Order Specific Question:   Reason for Exam:     Answer:   Left C3-C5 MBB #1     Order Specific Question:   Anticoagulant hold needed? Answer:   No     No orders of the defined types were placed in this encounter  History of Present Illness:  Alfredo Valencia is a 79 y o  female with a history of chronic pain secondary to neck pain, cervical disc disorder with radiculopathy, cervical spinal stenosis and myofascial pain  She was last seen on 05/24/2022 where she underwent a cervical epidural steroid injection which provided her relief of her neck pain for approximately 1 week  She also underwent a cervical epidural steroid injection on 03/16/2022 with relief for approximately 1 week before her neck pain symptoms returned  She presents to the office with ongoing bilateral neck pain, left worse than right  She states her pain is the same since the last office visit and constant  She rates the quality of her pain as dull/aching and is currently rating it a 5/10 on a numeric scale  Current pain medications include Tylenol as needed which is providing mild relief of her neck pain  I have personally reviewed and/or updated the patient's past medical history, past surgical history, family history, social history, current medications, allergies, and vital signs today  Review of Systems   Musculoskeletal: Positive for neck pain and neck stiffness         Patient Active Problem List   Diagnosis    Essential hypertension    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)    A-fib (HCC)    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HCC)    PAD (peripheral artery disease) (HCC)    Abnormal liver enzymes    Edema of left upper extremity    Urinary retention    Seizure-like activity ([address] )    Hyperlipidemia    Toxic metabolic encephalopathy    UTI (urinary tract infection)    Depression    Current use of long term anticoagulation    Long term current use of amiodarone    [facility] stenosis [facility]    Pulmonary hypertension (HCC)    Sick sinus syndrome ([facility]    Hx of CABG    CAD (coronary artery disease)    Memory loss    Mild cognitive impairment    Atherosclerosis with claudication of extremity (HCC)    Carotid stenosis, asymptomatic, bilateral    Acute (reversible) ischemia of small intestine, extent unspecified ([facility]    Cervical disc disorder with radiculopathy of mid-cervical region       Past Medical History:   Diagnosis Date    Anal fissure     Cardiac disorder     Cognitive changes 12/23/2020    Esophageal reflux     Esophagitis, reflux     Hemorrhoids     Hepatic hemangioma     Last Assessed: 1/13/2015    Herpes zoster     History of colonic polyps     Hypertension     Ischemic colitis ([facility]     Lumbar herniated disc     Malignant neoplasm without specification of site [facility]     Nephrolithiasis     L  Lithotripsy    Nontoxic single thyroid nodule     Last Assessed: 1/13/2015    Osteoarthritis     Overactive bladder     Raynaud disease     Respiratory system disease     Sjogren's disease ([facility]     Spinal stenosis     PONCHO (stress urinary incontinence, female)     Uterovaginal prolapse     Grade I-II       Past Surgical History:   Procedure Laterality Date    APPENDECTOMY  1947    CARDIAC PACEMAKER PLACEMENT  01/2021    CARDIAC SURGERY      CABG    CATARACT EXTRACTION Bilateral     COLONOSCOPY  2012    Fiberoptic    COLONOSCOPY      Resolved: 2006 - 2012 5 year f/u    CORONARY ANGIOPLASTY WITH STENT PLACEMENT      CORONARY ARTERY BYPASS GRAFT      Resolved: 2012    ESOPHAGOGASTRODUODENOSCOPY  2012    Diagnostic    HEMORROIDECTOMY      KNEE SURGERY      LITHOTRIPSY      Renal    MALIGNANT SKIN LESION EXCISION      Face; Resolved: 2004    VA ESOPHAGOGASTRODUODENOSCOPY TRANSORAL DIAGNOSTIC N/A 4/13/2016    Procedure: EGD AND COLONOSCOPY;  Surgeon: Bobbi Wen MD;  [facility];   Service: Gastroenterology    RENAL ARTERY STENT      SKIN LESION EXCISION      Scalp    SOFT TISSUE TUMOR RESECTION      Shoulder; Resolved: 1995    THROMBOLYSIS      Postoperative Thrombolysis PTCA    TONSILLECTOMY      Resolved: 1944       Family History   Problem Relation Age of Onset    Heart disease Mother     Hypertension Mother     Osteoporosis Mother     Heart disease Father     Hypertension Father     Ulcerative colitis Family     Colon polyps Family     No Known Problems Sister     Heart disease Brother     Diabetes Brother     No Known Problems Maternal Grandmother     No Known Problems Maternal Grandfather     No Known Problems Paternal Grandmother     No Known Problems Paternal Grandfather     No Known Problems Son     No Known Problems Daughter        Social History     Occupational History    Occupation: retired   Tobacco Use    Smoking status: Never Smoker    Smokeless tobacco: Never Used   Vaping Use    Vaping Use: Never used   Substance and Sexual Activity    Alcohol use: Not Currently     Alcohol/week: 1 0 standard drink     Types: 1 Glasses of wine per week    Drug use: Never    Sexual activity: Not Currently       Current Outpatient Medications on File Prior to Visit   Medication Sig    acetaminophen (TYLENOL) 325 mg tablet Take 2 tablets (650 mg total) by mouth every 6 (six) hours as needed for mild pain    amiodarone 200 mg tablet Take 0 5 tablets (100 mg total) by mouth daily with breakfast    amLODIPine (NORVASC) 5 mg tablet Take 1 tablet (5 mg total) by mouth daily    atorvastatin (LIPITOR) 20 mg tablet TAKE 1 TABLET BY MOUTH EVERY DAY WITH DINNER    bumetanide (BUMEX) 2 mg tablet TAKE 1 TABLET BY MOUTH EVERY DAY    gabapentin (NEURONTIN) 100 mg capsule Take 1 tablet at bedtime  May increase to 2 tablets after 3 days (Patient taking differently: 100 mg 2 (two) times a day Take 1 tablet at bedtime    May increase to 2 tablets after 3 days)    levETIRAcetam (KEPPRA) 500 mg tablet Take 1 tablet (500 mg total) by mouth every 12 (twelve) hours    methocarbamol (ROBAXIN) 500 mg tablet Take 1 tablet (500 mg total) by mouth 3 (three) times a day    metoprolol succinate (TOPROL-XL) 25 mg 24 hr tablet TAKE 1 TABLET BY MOUTH EVERY DAY    multivitamin (THERAGRAN) TABS Take 1 tablet by mouth daily   nitroglycerin (NITROSTAT) 0 4 mg SL tablet Place 1 tablet (0 4 mg total) under the tongue every 5 (five) minutes as needed for chest pain    prasugrel (EFFIENT) tablet TAKE 1 TABLET BY MOUTH EVERY DAY    sertraline (ZOLOFT) 25 mg tablet TAKE 1 TABLET BY MOUTH EVERY DAY    Xarelto 15 MG tablet TAKE 1 TABLET (15 MG TOTAL) BY MOUTH DAILY WITH DINNER    magnesium oxide (MAG-OX) 400 mg Take 1 tablet (400 mg total) by mouth 2 (two) times a day before lunch and dinner (Patient not taking: Reported on 8/5/2022)    potassium chloride (K-DUR,KLOR-CON) 20 mEq tablet Take 2 tablets (40 mEq total) by mouth daily (Patient not taking: Reported on 8/5/2022)     No current facility-administered medications on file prior to visit  Allergies   Allergen Reactions    Sulfa Antibiotics Anaphylaxis    Formaldehyde     Codeine Palpitations       Physical Exam:    /71   Pulse 78   Ht 4' (1 219 m)   Wt 64 4 kg (142 lb)   BMI 43 33 kg/m²     Constitutional:normal, well developed, well nourished, alert, in no distress and non-toxic and no overt pain behavior    Eyes:anicteric  HEENT:grossly intact  Neck:supple, symmetric, trachea midline and no masses   Pulmonary:even and unlabored  Cardiovascular:No edema or pitting edema present  Skin:Normal without rashes or lesions and well hydrated  Psychiatric:Mood and affect appropriate  Neurologic:Cranial Nerves II-XII grossly intact  Musculoskeletal:antalgic     Cervical Spine Exam    Appearance:  Normal lordosis  Palpation/Tenderness:  left cervical paraspinal tenderness  right cervical paraspinal tenderness  Sensory:  no sensory deficits noted  Range of Motion:  Flexion:  Minimally limited with pain  Extension:  Minimally limited  with pain  Rotation - Left:  Minimally limited  with pain  Rotation - Right:  Minimally limited  with pain  Motor Strength:  Left Arm Flexion  5/5  Left Arm Extension  5/5  Right Arm Flexion  5/5  Right Arm Extension  5/5  Left    5/5  Right   5/5        Imaging

## 2022-08-15 ENCOUNTER — TELEPHONE (OUTPATIENT)
Dept: PAIN MEDICINE | Facility: CLINIC | Age: 82
End: 2022-08-15

## 2022-08-15 NOTE — TELEPHONE ENCOUNTER
Patient's  Ken Elizabeth requesting to schedule pt's procedure   Please reach out to him at # [phone] (please call at 11 pm) , thx

## 2022-08-16 NOTE — TELEPHONE ENCOUNTER
Patient scheduled for procedure with Larry Sas has been scheduled for 9/20/22 with an arrival time of 9:50am    Patient does not have mychart, so the following instructions were given to patient verbally, no otc or other pain meds 6hrs prior and 6-8 hrs after the procedure, pain level before procedure should be 5 or greater, no vaccines 14 days prior or after procedure, nothing to eat or drink 1 hr prior to procedure, wear something loose and comfortable, no jewelry, if ill, infection or antibiotics, must call office to reschedule procedure  Take prescription medications as normal and you will need a  18 yrs or older

## 2022-09-20 ENCOUNTER — HOSPITAL ENCOUNTER (OUTPATIENT)
Dept: RADIOLOGY | Facility: CLINIC | Age: 82
Discharge: HOME | End: 2022-09-20
Payer: MEDICARE

## 2022-09-20 VITALS
RESPIRATION RATE: 20 BRPM | SYSTOLIC BLOOD PRESSURE: 105 MMHG | TEMPERATURE: 98.1 F | DIASTOLIC BLOOD PRESSURE: 78 MMHG | OXYGEN SATURATION: 95 % | HEART RATE: 75 BPM

## 2022-09-20 DIAGNOSIS — M47.812 CERVICAL SPONDYLOSIS: ICD-10-CM

## 2022-09-20 PROCEDURE — 64490 INJ PARAVERT F JNT C/T 1 LEV: CPT | Performed by: ANESTHESIOLOGY

## 2022-09-20 PROCEDURE — 64491 INJ PARAVERT F JNT C/T 2 LEV: CPT | Performed by: ANESTHESIOLOGY

## 2022-09-20 RX ORDER — BUPIVACAINE HCL/PF 2.5 MG/ML
10 VIAL (ML) INJECTION ONCE
Status: COMPLETED | OUTPATIENT
Start: 2022-09-20 | End: 2022-09-20

## 2022-09-20 RX ADMIN — BUPIVACAINE HYDROCHLORIDE 10 ML: 2.5 INJECTION, SOLUTION EPIDURAL; INFILTRATION; INTRACAUDAL at 10:31

## 2022-09-20 NOTE — DISCHARGE INSTR - LAB

## 2022-09-20 NOTE — H&P
History of Present Illness: The patient is a 79 y o  female who presents with complaints of left-sided neck pain is here today for left-sided C3-5 medial branch blocks    Patient Active Problem List   Diagnosis    Essential hypertension    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[address] )    A-fib ([address] )    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[address] )    PAD (peripheral artery disease) ([address] )    Abnormal liver enzymes    Edema of left upper extremity    Urinary retention    Seizure-like activity (HCC)    Hyperlipidemia    Toxic metabolic encephalopathy    UTI (urinary tract infection)    Depression    Current use of long term anticoagulation    Long term current use of amiodarone    Renal artery stenosis (HCC)    Pulmonary hypertension ([address] )    Sick sinus syndrome ([address] )    Hx of CABG    CAD (coronary artery disease)    Memory loss    Mild cognitive impairment    Atherosclerosis with claudication of extremity ([address] )    Carotid stenosis, asymptomatic, bilateral    Acute (reversible) ischemia of small intestine, extent unspecified ([address] )    Cervical disc disorder with radiculopathy of mid-cervical region       Past Medical History:   Diagnosis Date    Anal fissure     Cardiac disorder     Cognitive changes 12/23/2020    Esophageal reflux     Esophagitis, reflux     Hemorrhoids     Hepatic hemangioma     Last Assessed: 1/13/2015    Herpes zoster     History of colonic polyps     Hypertension     Ischemic colitis ([address] )     Lumbar herniated disc     Malignant neoplasm without specification of site ([address] )     Nephrolithiasis     L   Lithotripsy    Nontoxic single thyroid nodule     Last Assessed: 1/13/2015    Osteoarthritis     Overactive bladder     Raynaud disease     Respiratory system disease     Sjogren's disease ([address] )     Spinal stenosis     PONCHO (stress urinary incontinence, female)     Uterovaginal prolapse     Grade I-II       Past Surgical History:   Procedure Laterality Date  APPENDECTOMY  1947    CARDIAC PACEMAKER PLACEMENT  01/2021    CARDIAC SURGERY      CABG    CATARACT EXTRACTION Bilateral     COLONOSCOPY  2012    Fiberoptic    COLONOSCOPY      Resolved: 2006 - 2012 5 year f/u    CORONARY ANGIOPLASTY WITH STENT PLACEMENT      CORONARY ARTERY BYPASS GRAFT      Resolved: 2012    ESOPHAGOGASTRODUODENOSCOPY  2012    Diagnostic    HEMORROIDECTOMY      KNEE SURGERY      LITHOTRIPSY      Renal    MALIGNANT SKIN LESION EXCISION      Face; Resolved: 2004    CA ESOPHAGOGASTRODUODENOSCOPY TRANSORAL DIAGNOSTIC N/A 4/13/2016    Procedure: EGD AND COLONOSCOPY;  Surgeon: Yaz Granger MD;  [facility]; Service: Gastroenterology    RENAL ARTERY STENT      SKIN LESION EXCISION      Scalp    SOFT TISSUE TUMOR RESECTION      Shoulder; Resolved: 1995    THROMBOLYSIS      Postoperative Thrombolysis PTCA    TONSILLECTOMY      Resolved: 1944         Current Outpatient Medications:     acetaminophen (TYLENOL) 325 mg tablet, Take 2 tablets (650 mg total) by mouth every 6 (six) hours as needed for mild pain, Disp: , Rfl: 0    amiodarone 200 mg tablet, Take 0 5 tablets (100 mg total) by mouth daily with breakfast, Disp: 45 tablet, Rfl: 3    amLODIPine (NORVASC) 5 mg tablet, Take 1 tablet (5 mg total) by mouth daily, Disp: 90 tablet, Rfl: 3    atorvastatin (LIPITOR) 20 mg tablet, TAKE 1 TABLET BY MOUTH EVERY DAY WITH DINNER, Disp: 30 tablet, Rfl: 8    bumetanide (BUMEX) 2 mg tablet, TAKE 1 TABLET BY MOUTH EVERY DAY, Disp: 30 tablet, Rfl: 8    gabapentin (NEURONTIN) 100 mg capsule, Take 1 tablet at bedtime  May increase to 2 tablets after 3 days (Patient taking differently: 100 mg 2 (two) times a day Take 1 tablet at bedtime    May increase to 2 tablets after 3 days), Disp: 60 capsule, Rfl: 1    levETIRAcetam (KEPPRA) 500 mg tablet, Take 1 tablet (500 mg total) by mouth every 12 (twelve) hours, Disp: 60 tablet, Rfl: 3    magnesium oxide (MAG-OX) 400 mg, Take 1 tablet (400 mg total) by mouth 2 (two) times a day before lunch and dinner (Patient not taking: Reported on 8/5/2022), Disp: , Rfl: 0    methocarbamol (ROBAXIN) 500 mg tablet, Take 1 tablet (500 mg total) by mouth 3 (three) times a day, Disp: 30 tablet, Rfl: 1    metoprolol succinate (TOPROL-XL) 25 mg 24 hr tablet, TAKE 1 TABLET BY MOUTH EVERY DAY, Disp: 30 tablet, Rfl: 8    multivitamin (THERAGRAN) TABS, Take 1 tablet by mouth daily  , Disp: , Rfl:     nitroglycerin (NITROSTAT) 0 4 mg SL tablet, Place 1 tablet (0 4 mg total) under the tongue every 5 (five) minutes as needed for chest pain, Disp: 25 tablet, Rfl: 0    potassium chloride (K-DUR,KLOR-CON) 20 mEq tablet, Take 2 tablets (40 mEq total) by mouth daily (Patient not taking: Reported on 8/5/2022), Disp: 60 tablet, Rfl: 8    prasugrel (EFFIENT) tablet, TAKE 1 TABLET BY MOUTH EVERY DAY, Disp: 30 tablet, Rfl: 11    sertraline (ZOLOFT) 25 mg tablet, TAKE 1 TABLET BY MOUTH EVERY DAY, Disp: 90 tablet, Rfl: 0    Xarelto 15 MG tablet, TAKE 1 TABLET (15 MG TOTAL) BY MOUTH DAILY WITH DINNER, Disp: 30 tablet, Rfl: 8  No current facility-administered medications for this encounter  Allergies   Allergen Reactions    Sulfa Antibiotics Anaphylaxis    Formaldehyde     Codeine Palpitations       Physical Exam:   Vitals:    09/20/22 1007   BP: 93/57   Pulse: 73   Resp: 20   Temp: 98 1 °F (36 7 °C)   SpO2: 93%     General: Awake, Alert, Oriented x 3, Mood and affect appropriate  Respiratory: Respirations even and unlabored  Cardiovascular: Peripheral pulses intact; no edema  Musculoskeletal Exam:  Left-sided neck pain    ASA Score: 3    Patient/Chart Verification  Patient ID Verified: Verbal  Consents Confirmed: To be obtained in the Pre-Procedure area  Allergies Reviewed: Yes  Anticoag/NSAID held?: NA  Currently on antibiotics?: No    Assessment:   1   Cervical spondylosis        Plan: Left C3-C5 MBB #1

## 2022-10-03 ENCOUNTER — APPOINTMENT (OUTPATIENT)
Dept: RADIOLOGY | Facility: HOSPITAL | Age: 82
End: 2022-10-03
Payer: MEDICARE

## 2022-10-03 ENCOUNTER — HOSPITAL ENCOUNTER (EMERGENCY)
Facility: HOSPITAL | Age: 82
Discharge: HOME | End: 2022-10-04
Attending: SURGERY
Payer: MEDICARE

## 2022-10-03 DIAGNOSIS — S81.811A LEG LACERATION, RIGHT, INITIAL ENCOUNTER: ICD-10-CM

## 2022-10-03 DIAGNOSIS — S85.211D: Primary | ICD-10-CM
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
## 2022-10-03 LAB
ABO GROUP BLD: NORMAL
BLD GP AB SCN SERPL QL: NEGATIVE
RH BLD: POSITIVE
SPECIMEN EXPIRATION DATE: NORMAL

## 2022-10-03 PROCEDURE — 76705 ECHO EXAM OF ABDOMEN: CPT | Performed by: NURSE PRACTITIONER

## 2022-10-03 PROCEDURE — 85014 HEMATOCRIT: CPT

## 2022-10-03 PROCEDURE — 82947 ASSAY GLUCOSE BLOOD QUANT: CPT

## 2022-10-03 PROCEDURE — 82803 BLOOD GASES ANY COMBINATION: CPT

## 2022-10-03 PROCEDURE — 12002 RPR S/N/AX/GEN/TRNK2.6-7.5CM: CPT | Performed by: NURSE PRACTITIONER

## 2022-10-03 PROCEDURE — 82330 ASSAY OF CALCIUM: CPT

## 2022-10-03 PROCEDURE — NC001 PR NO CHARGE: Performed by: EMERGENCY MEDICINE

## 2022-10-03 PROCEDURE — 86850 RBC ANTIBODY SCREEN: CPT | Performed by: SURGERY

## 2022-10-03 PROCEDURE — 36415 COLL VENOUS BLD VENIPUNCTURE: CPT | Performed by: SURGERY

## 2022-10-03 PROCEDURE — 86900 BLOOD TYPING SEROLOGIC ABO: CPT | Performed by: SURGERY

## 2022-10-03 PROCEDURE — 84295 ASSAY OF SERUM SODIUM: CPT

## 2022-10-03 PROCEDURE — 73590 X-RAY EXAM OF LOWER LEG: CPT

## 2022-10-03 PROCEDURE — 84132 ASSAY OF SERUM POTASSIUM: CPT

## 2022-10-03 PROCEDURE — 99285 EMERGENCY DEPT VISIT HI MDM: CPT

## 2022-10-03 PROCEDURE — 93308 TTE F-UP OR LMTD: CPT | Performed by: NURSE PRACTITIONER

## 2022-10-03 PROCEDURE — 86901 BLOOD TYPING SEROLOGIC RH(D): CPT | Performed by: SURGERY

## 2022-10-03 PROCEDURE — 99204 OFFICE O/P NEW MOD 45 MIN: CPT | Performed by: SURGERY

## 2022-10-03 RX ORDER — LIDOCAINE HYDROCHLORIDE AND EPINEPHRINE 10; 10 MG/ML; UG/ML
INJECTION, SOLUTION INFILTRATION; PERINEURAL
Status: COMPLETED
Start: 2022-10-03 | End: 2022-10-03

## 2022-10-03 RX ORDER — LIDOCAINE HYDROCHLORIDE 10 MG/ML
INJECTION, SOLUTION EPIDURAL; INFILTRATION; INTRACAUDAL; PERINEURAL
Status: DISCONTINUED
Start: 2022-10-03 | End: 2022-10-03 | Stop reason: WASHOUT

## 2022-10-03 RX ORDER — LIDOCAINE HYDROCHLORIDE AND EPINEPHRINE 10; 10 MG/ML; UG/ML
10 INJECTION, SOLUTION INFILTRATION; PERINEURAL ONCE
Status: COMPLETED | OUTPATIENT
Start: 2022-10-03 | End: 2022-10-03

## 2022-10-03 RX ADMIN — LIDOCAINE HYDROCHLORIDE AND EPINEPHRINE 10 ML: 10; 10 INJECTION, SOLUTION INFILTRATION; PERINEURAL at 22:10

## 2022-10-04 VITALS
WEIGHT: 134.26 LBS | RESPIRATION RATE: 16 BRPM | HEART RATE: 71 BPM | OXYGEN SATURATION: 96 % | SYSTOLIC BLOOD PRESSURE: 106 MMHG | TEMPERATURE: 97.3 F | DIASTOLIC BLOOD PRESSURE: 51 MMHG
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
## 2022-10-04 PROCEDURE — 90471 IMMUNIZATION ADMIN: CPT

## 2022-10-04 PROCEDURE — 90715 TDAP VACCINE 7 YRS/> IM: CPT | Performed by: NURSE PRACTITIONER

## 2022-10-04 RX ADMIN — TETANUS TOXOID, REDUCED DIPHTHERIA TOXOID AND ACELLULAR PERTUSSIS VACCINE, ADSORBED 0.5 ML: 5; 2.5; 8; 8; 2.5 SUSPENSION INTRAMUSCULAR at 00:15

## 2022-10-04 NOTE — PROCEDURES
Laceration repair    Date/Time: 10/3/2022 10:25 PM  Performed by: KATHY Hinton  Authorized by: KATHY Hinton   Consent: Verbal consent obtained  Risks and benefits: risks, benefits and alternatives were discussed  Consent given by: patient  Patient identity confirmed: verbally with patient  Time out: Immediately prior to procedure a "time out" was called to verify the correct patient, procedure, equipment, support staff and site/side marked as required    Laceration length: 4 5 cm    Anesthesia:  Local Anesthetic: lidocaine 1% with epinephrine      Procedure Details:  Irrigation solution: saline (With iodine)  Irrigation method: syringe  Amount of cleaning: standard  Debridement: none  Degree of undermining: none  Skin closure: 4-0 nylon (5 simple sutures)  Subcutaneous closure: 4-0 Vicryl (2 internal simple)  Approximation: close  Approximation difficulty: simple  Dressing: 4x4 sterile gauze (Ace)  Patient tolerance: patient tolerated the procedure well with no immediate complications

## 2022-10-04 NOTE — H&P
H&P - Trauma   Olya Burgos 79 y o  female MRN: [id number]  Unit/Bed#: ED-09 Encounter: [id number]    Trauma Alert: Level B   Model of Arrival: Ambulance    Trauma Team: Attending María Elena Zepeda and HERBERT Garza  Consultants:     None     Assessment/Plan   Active Problems / Assessment:   · Fall--heavy object that she was carrying fell on to right leg  · Right lower leg laceration--closed with sutures  · Left hand skin tear-  well approximated with Steri-Strips     Plan:   · Ambulatory and PO trial  · Sutures to be removed in 2-3  Weeks 10/18-10/25--sutures may be removed by PCP or during follow-up visit with trauma  · Patient has been educated on return precautions, signs symptoms of infection, fall precautions, required follow-up-the verbally confirmed understanding  · Follow-up with PCP follow-up with trauma as needed  History of Present Illness     Chief Complaint:  “I cut my leg”  Mechanism:Fall     HPI:    Olya Burgos is a 79 y o  female with history of atrial fibrillation that she takes Xarelto for, presenting today for evaluation after suffering a fall  Patient describes that she was carrying a heavy  when she tripped and subsequently fell to the ground striking her head  No reported loss of consciousness  Patient does report that the  did fall on her right leg causing a laceration  She was not cut by blade but rather the blunt head be based  Patient denies any presyncopal type symptoms including but not limited to dizziness, lightheadedness, shortness of breath, difficulty breathing, chest pain, palpitations-reports that she fell due to the  being too heavy for her to carry  On my evaluation, patient complains of right leg pain, left hand pain, forehead pain, and nose pain  Review of Systems   Constitutional: Negative  HENT: Negative  Eyes: Negative  Respiratory: Negative  Cardiovascular: Negative  Gastrointestinal: Negative  Endocrine: Negative  Genitourinary: Negative  Musculoskeletal: Negative for arthralgias, back pain, gait problem, joint swelling, myalgias, neck pain and neck stiffness  Skin: Positive for wound (Laceration on right lower leg, skin tear on left hand)  Allergic/Immunologic: Negative  Neurological: Positive for headaches ( forehead pain)  Negative for dizziness, tremors, seizures, syncope, weakness, light-headedness and numbness  Hematological: Negative  Psychiatric/Behavioral: Negative  12-point, complete review of systems was reviewed and negative except as stated above  Historical Information      Medical history:  Cognitive changes, hypertension, AFib, tachy-jillian syndrome, PUD, peripheral artery stenosis, pulmonary hypertension, sick sinus syndrome, CAD, hyperlipidemia, depression  Surgical history:  Cardiac pacemaker placement, appendectomy, cataract surgery, coronary artery bypass, knee surgery, lithotripsy, renal artery stent, tonsillectomy  Social history:  No alcohol use, no drug use, no nicotine use         There is no immunization history on file for this patient  Last Tetanus:  Unknown  Updated  Family History: Non-contributory       Meds/Allergies   all current active meds have been reviewed  Allergies have not been reviewed;   Not on File    Objective   Initial Vitals:   Temperature: (!) 97 3 °F (36 3 °C) (10/03/22 2100)  Pulse: 82 (10/03/22 2100)  Respirations: 18 (10/03/22 2100)  Blood Pressure: 142/68 (10/03/22 2100)    Primary Survey:   Airway:        Status: patent;        Pre-hospital Interventions: none        Hospital Interventions: none  Breathing:        Pre-hospital Interventions: none       Effort: normal       Right breath sounds: normal       Left breath sounds: normal  Circulation:        Rhythm: regular       Rate: regular   Right Pulses Left Pulses    R radial: 2+  R femoral: 2+  R pedal: 2+  R carotid: 2+  R popliteal: 2+ L radial: 2+  L femoral: 2+  L pedal: 2+  L carotid: 2+  L popliteal: 2+   Disability:        GCS: Eye: 4; Verbal: 5 Motor: 6 Total: 15       Right Pupil: 4 mm;  round;  reactive         Left Pupil:  4 mm;  round;  reactive      R Motor Strength L Motor Strength    R : 5/5  R dorsiflex: 5/5  R plantarflex: 5/5 L : 5/5  L dorsiflex: 5/5  L plantarflex: 5/5        Sensory:  No sensory deficit  Exposure:       Completed: Yes      Secondary Survey:  Physical Exam  Constitutional:       General: She is not in acute distress  Appearance: She is not ill-appearing  HENT:      Head: Normocephalic and atraumatic  Right Ear: External ear normal       Left Ear: External ear normal       Nose: Nose normal       Mouth/Throat:      Mouth: Mucous membranes are moist       Pharynx: Oropharynx is clear  Eyes:      Extraocular Movements: Extraocular movements intact  Pupils: Pupils are equal, round, and reactive to light  Neck:      Comments: Cervical collar in place  No C-spine tenderness  Cardiovascular:      Rate and Rhythm: Normal rate  Rhythm irregular  Pulses: Normal pulses  Heart sounds: Normal heart sounds  Pulmonary:      Effort: Pulmonary effort is normal  No respiratory distress  Breath sounds: Normal breath sounds  No stridor  No wheezing, rhonchi or rales  Chest:      Chest wall: No tenderness  Abdominal:      General: Abdomen is flat  Bowel sounds are normal  There is no distension  Palpations: Abdomen is soft  Tenderness: There is no abdominal tenderness  There is no guarding     Genitourinary:     Comments: Pelvis stable  Musculoskeletal:      Right shoulder: Normal       Right upper arm: Normal       Left upper arm: Normal       Right elbow: Normal       Left elbow: Normal       Right forearm: Normal       Left forearm: Normal       Right wrist: Normal       Left wrist: Normal       Right hand: Normal       Left hand: Normal       Cervical back: Normal       Thoracic back: Normal       Lumbar back: Normal  Right hip: Normal       Left hip: Normal       Right upper leg: Normal       Left upper leg: Normal       Right knee: Normal       Left knee: Normal       Right lower leg: Normal       Left lower leg: Normal       Right ankle: Normal       Left ankle: Normal       Right foot: Normal       Left foot: Normal    Skin:     Capillary Refill: Capillary refill takes less than 2 seconds  Neurological:      Mental Status: She is alert and oriented to person, place, and time  GCS: GCS eye subscore is 4  GCS verbal subscore is 5  GCS motor subscore is 6  Sensory: Sensation is intact  Motor: Motor function is intact  No weakness  Psychiatric:         Speech: Speech normal          Behavior: Behavior normal  Behavior is cooperative  Invasive Devices  Report    None               Lab Results: Results: I have personally reviewed all pertinent laboratory/tests results, BMP/CMP: No results found for: SODIUM, K, CL, CO2, ANIONGAP, BUN, CREATININE, GLUCOSE, CALCIUM, AST, ALT, ALKPHOS, PROT, BILITOT, EGFR and CBC: No results found for: WBC, HGB, HCT, MCV, PLT, ADJUSTEDWBC, MCH, MCHC, RDW, MPV, NRBC    Imaging Results: I have personally reviewed pertinent reports  Chest Xray(s): negative for acute findings   FAST exam(s): negative for acute findings   CT Scan(s): negative for acute findings   Additional Xray(s): negative for acute findings     Other Studies: none    Code Status: No Order  Advance Directive and Living Will:      Power of :    POLST:    I have spent 28 minutes with Patient and family today in which greater than 50% of this time was spent in counseling/coordination of care regarding Diagnostic results, Prognosis, Risks and benefits of tx options, Intructions for management, Patient and family education, Importance of tx compliance, Risk factor reductions and Impressions  Cervical Collar Clearance:     The patient had a CT scan of the cervical spine demonstrating no acute injury  On exam, the patient had no midline point tenderness or paresthesias/numbness/weakness in the extremities  The patient had full range of motion (was then able to flex, extend, and rotate head laterally) without pain  There were no distracting injuries and the patient was not intoxicated  The patient's cervical spine was cleared radiologically and clinically  Cervical collar removed at this time       April Marcin  10/4/2022 12:13 AM

## 2022-10-04 NOTE — ED PROVIDER NOTES
Emergency Department Airway Evaluation and Management Form    History  Obtained from: patient, EMS  Patient has no allergy information on record  No chief complaint on file  HPI     Patient presents as a pre-hospital trauma level B activation fall, head strike on Xarelto  Patient complains of diffuse pain  No past medical history on file  No past surgical history on file  No family history on file  I have reviewed and agree with the history as documented  Review of Systems     Per trauma    Physical Exam  /68   Pulse 82   Temp (!) 97 3 °F (36 3 °C) (Oral)   Resp 18   SpO2 97%     Physical Exam     Per trauma    ED Medications  Medications - No data to display    Intubation  Procedures    Notes  Airway intact, equal, bilateral breath sounds  Additional workup, documentation, disposition per trauma team who was at bedside for trauma level B activation      Final Diagnosis  Final diagnoses:   None       ED Provider  Electronically Signed by     Asael Mauricio MD  10/03/22 3781

## 2022-10-04 NOTE — PROCEDURES
POC FAST US    Date/Time: 10/3/2022 10:34 PM  Performed by: KATHY Pinedo  Authorized by: Carlee Pinedo     Patient location:  Trauma  Procedure details:     Exam Type:  Diagnostic    Indications: blunt abdominal trauma and blunt chest trauma      Assess for:  Intra-abdominal fluid and pericardial effusion    Technique: FAST      Views obtained:  Heart - Pericardial sac, LUQ - Splenorenal space, Suprapubic - Pouch of Robles and RUQ - James's Pouch    Image quality: diagnostic      Image availability:  Images available in PACS and video obtained  FAST Findings:     RUQ (Hepatorenal) free fluid: absent      LUQ (Splenorenal) free fluid: absent      Suprapubic free fluid: absent      Cardiac wall motion: identified      Pericardial effusion: absent    Interpretation:     Impressions: negative

## 2022-10-11 LAB
BASE EXCESS BLDA CALC-SCNC: 7 MMOL/L (ref -2–3)
HCO3 BLDA-SCNC: 31 MMOL/L (ref 24–30)
PCO2 BLD: 32 MMOL/L (ref 21–32)
PCO2 BLD: 41.3 MM HG (ref 42–50)
PH BLD: 7.48 [PH] (ref 7.3–7.4)
SODIUM BLD-SCNC: 138 MMOL/L (ref 136–145)
SPECIMEN SOURCE: ABNORMAL

## 2022-10-12 ENCOUNTER — RA CDI HCC (OUTPATIENT)
Dept: OTHER | Facility: HOSPITAL | Age: 82
End: 2022-10-12

## 2022-10-12 PROBLEM — N39.0 UTI (URINARY TRACT INFECTION): Status: RESOLVED | Noted: 2021-03-05 | Resolved: 2022-10-12

## 2022-10-12 NOTE — PROGRESS NOTES
[facility]  coding opportunities     I13 0  Chart Reviewed number of suggestions sent to Provider: 1     Patients Insurance     Medicare Insurance: [employer]

## 2022-10-18 ENCOUNTER — OFFICE VISIT (OUTPATIENT)
Dept: FAMILY MEDICINE CLINIC | Facility: MEDICAL CENTER | Age: 82
End: 2022-10-18
Payer: MEDICARE

## 2022-10-18 VITALS
BODY MASS INDEX: 33.33 KG/M2 | TEMPERATURE: 98.2 F | OXYGEN SATURATION: 96 % | DIASTOLIC BLOOD PRESSURE: 58 MMHG | SYSTOLIC BLOOD PRESSURE: 118 MMHG | HEIGHT: 55 IN | WEIGHT: 144 LBS | HEART RATE: 70 BPM

## 2022-10-18 DIAGNOSIS — S81.801D WOUND OF RIGHT LOWER EXTREMITY, SUBSEQUENT ENCOUNTER: Primary | ICD-10-CM

## 2022-10-18 DIAGNOSIS — Z23 IMMUNIZATION DUE: ICD-10-CM

## 2022-10-18 PROCEDURE — 99213 OFFICE O/P EST LOW 20 MIN: CPT | Performed by: STUDENT IN AN ORGANIZED HEALTH CARE EDUCATION/TRAINING PROGRAM

## 2022-10-18 PROCEDURE — 90662 IIV NO PRSV INCREASED AG IM: CPT | Performed by: STUDENT IN AN ORGANIZED HEALTH CARE EDUCATION/TRAINING PROGRAM

## 2022-10-18 PROCEDURE — G0008 ADMIN INFLUENZA VIRUS VAC: HCPCS | Performed by: STUDENT IN AN ORGANIZED HEALTH CARE EDUCATION/TRAINING PROGRAM

## 2022-10-18 NOTE — PROGRESS NOTES
[address] Inf - Clinic Note  Rich August, 10/18/22     Cr Eng MRN: [id number] : 1940 Age: 79 y o  Assessment/Plan     1  Wound of right lower extremity, subsequent encounter    - patient presents for ER follow-up  - she did sustain a right lower leg laceration after a fall, 5 sutures were removed today in office, patient tolerated procedure well, provided instruction about dressing  - advised about signs and symptoms of infection in which case she should seek medical attention  - Suture removal    2  Immunization due    - influenza vaccine, high-dose, PF 0 7 mL (FLUZONE HIGH-DOSE)    Cr Eng acknowledged understanding of treatment plan, all questions answered  Subjective      Cr Eng is a 79 y o  female presents for ER follow-up  Presents with her  Bita Riggs  She was seen in emergency room on  at [address] [place] [address] after fall  Patient did sustain a right lower leg laceration that was closed with sutures  She also had left hand skin tear which was approximated with Steri-Strips  Patient had full trauma workup  The following portions of the patient's history were reviewed and updated as appropriate: allergies, current medications, past family history, past medical history, past social history, past surgical history and problem list      Past Medical History:   Diagnosis Date   • Anal fissure    • Cardiac disorder    • Cognitive changes 2020   • Esophageal reflux    • Esophagitis, reflux    • Hemorrhoids    • Hepatic hemangioma     Last Assessed: 2015   • Herpes zoster    • History of colonic polyps    • Hypertension    • Ischemic colitis ([facility] [address] )    • Lumbar herniated disc    • Malignant neoplasm without specification of site [facility])    • Nephrolithiasis     L   Lithotripsy   • Nontoxic single thyroid nodule     Last Assessed: 2015   • Osteoarthritis    • Overactive bladder    • Raynaud disease    • Respiratory system disease    • Sjogren's disease ([facility] [address] )    • Spinal stenosis    • PONCHO (stress urinary incontinence, female)    • Uterovaginal prolapse     Grade I-II       Allergies   Allergen Reactions   • Sulfa Antibiotics Anaphylaxis   • Formaldehyde    • Codeine Palpitations       Past Surgical History:   Procedure Laterality Date   • APPENDECTOMY  1947   • CARDIAC PACEMAKER PLACEMENT  01/2021   • CARDIAC SURGERY      CABG   • CATARACT EXTRACTION Bilateral    • COLONOSCOPY  2012    Fiberoptic   • COLONOSCOPY      Resolved: 2006 - 2012 5 year f/u   • CORONARY ANGIOPLASTY WITH STENT PLACEMENT     • CORONARY ARTERY BYPASS GRAFT      Resolved: 2012   • ESOPHAGOGASTRODUODENOSCOPY  2012    Diagnostic   • HEMORROIDECTOMY     • KNEE SURGERY     • LITHOTRIPSY      Renal   • MALIGNANT SKIN LESION EXCISION      Face; Resolved: 2004   • CT ESOPHAGOGASTRODUODENOSCOPY TRANSORAL DIAGNOSTIC N/A 4/13/2016    Procedure: EGD AND COLONOSCOPY;  Surgeon: Luke Toney MD;  Location: [facility];   Service: Gastroenterology   • RENAL ARTERY STENT     • SKIN LESION EXCISION      Scalp   • SOFT TISSUE TUMOR RESECTION      Shoulder; Resolved: 1995   • THROMBOLYSIS      Postoperative Thrombolysis PTCA   • TONSILLECTOMY      Resolved: 1944       Family History   Problem Relation Age of Onset   • Heart disease Mother    • Hypertension Mother    • Osteoporosis Mother    • Heart disease Father    • Hypertension Father    • Ulcerative colitis Family    • Colon polyps Family    • No Known Problems Sister    • Heart disease Brother    • Diabetes Brother    • No Known Problems Maternal Grandmother    • No Known Problems Maternal Grandfather    • No Known Problems Paternal Grandmother    • No Known Problems Paternal Grandfather    • No Known Problems Son    • No Known Problems Daughter        Social History     Socioeconomic History   • Marital status: /Civil Union     Spouse name: None   • Number of children: None   • Years of education: None   • Highest education level: None   Occupational History   • Occupation: retired   Tobacco Use   • Smoking status: Never Smoker   • Smokeless tobacco: Never Used   Vaping Use   • Vaping Use: Never used   Substance and Sexual Activity   • Alcohol use: Not Currently     Alcohol/week: 1 0 standard drink     Types: 1 Glasses of wine per week   • Drug use: Never   • Sexual activity: Not Currently   Other Topics Concern   • None   Social History Narrative    Activities: golf    Caffeine use    Consumes healthy diet    Daily caffeinated coffee consumption: 1 cup per day    Drinks caffeinated tea: 1 cup per day    Well balanced diet     Social Determinants of Health     Financial Resource Strain: Not on file   Food Insecurity: Not on file   Transportation Needs: Not on file   Physical Activity: Not on file   Stress: Not on file   Social Connections: Not on file   Intimate Partner Violence: Not on file   Housing Stability: Not on file       Current Outpatient Medications   Medication Sig Dispense Refill   • amiodarone 200 mg tablet Take 0 5 tablets (100 mg total) by mouth daily with breakfast 45 tablet 3   • atorvastatin (LIPITOR) 20 mg tablet TAKE 1 TABLET BY MOUTH EVERY DAY WITH DINNER 30 tablet 8   • bumetanide (BUMEX) 2 mg tablet TAKE 1 TABLET BY MOUTH EVERY DAY 30 tablet 8   • metoprolol succinate (TOPROL-XL) 25 mg 24 hr tablet TAKE 1 TABLET BY MOUTH EVERY DAY 30 tablet 8   • multivitamin (THERAGRAN) TABS Take 1 tablet by mouth daily       • nitroglycerin (NITROSTAT) 0 4 mg SL tablet Place 1 tablet (0 4 mg total) under the tongue every 5 (five) minutes as needed for chest pain 25 tablet 0   • prasugrel (EFFIENT) tablet TAKE 1 TABLET BY MOUTH EVERY DAY 30 tablet 11   • sertraline (ZOLOFT) 25 mg tablet TAKE 1 TABLET BY MOUTH EVERY DAY 90 tablet 0   • Xarelto 15 MG tablet TAKE 1 TABLET (15 MG TOTAL) BY MOUTH DAILY WITH DINNER 30 tablet 8   • acetaminophen (TYLENOL) 325 mg tablet Take 2 tablets (650 mg total) by mouth every 6 (six) hours as needed for mild pain  0   • amLODIPine (NORVASC) 5 mg tablet Take 1 tablet (5 mg total) by mouth daily 90 tablet 3   • gabapentin (NEURONTIN) 100 mg capsule Take 1 tablet at bedtime  May increase to 2 tablets after 3 days (Patient taking differently: 100 mg 2 (two) times a day Take 1 tablet at bedtime  May increase to 2 tablets after 3 days) 60 capsule 1   • levETIRAcetam (KEPPRA) 500 mg tablet Take 1 tablet (500 mg total) by mouth every 12 (twelve) hours 60 tablet 3   • magnesium oxide (MAG-OX) 400 mg Take 1 tablet (400 mg total) by mouth 2 (two) times a day before lunch and dinner  0   • methocarbamol (ROBAXIN) 500 mg tablet Take 1 tablet (500 mg total) by mouth 3 (three) times a day 30 tablet 1   • potassium chloride (K-DUR,KLOR-CON) 20 mEq tablet Take 2 tablets (40 mEq total) by mouth daily 60 tablet 8     No current facility-administered medications for this visit  Review of Systems     As noted in HPI    Objective      /58 (BP Location: Left arm, Patient Position: Sitting, Cuff Size: Adult)   Pulse 70   Temp 98 2 °F (36 8 °C)   Ht 4' (1 219 m)   Wt 65 3 kg (144 lb)   SpO2 96%   BMI 43 94 kg/m²     Physical Exam  Vitals reviewed  Constitutional:       General: She is not in acute distress  Appearance: Normal appearance  She is not ill-appearing or toxic-appearing  HENT:      Head: Normocephalic and atraumatic  Eyes:      Conjunctiva/sclera: Conjunctivae normal    Pulmonary:      Effort: Pulmonary effort is normal    Skin:     General: Skin is warm and dry  Findings: Lesion: right lower extremity healing laceration, with some localized surrounding erythema, no drainage  Also left hand healing abrasion  Neurological:      Mental Status: She is alert and oriented to person, place, and time  Psychiatric:         Mood and Affect: Mood normal          Behavior: Behavior normal          Thought Content:  Thought content normal          Judgment: Judgment normal  Suture removal    Date/Time: 10/18/2022 1:56 PM  Performed by: Verónica Jacobs DO  Authorized by: Verónica Jacobs DO   Universal Protocol:  Consent: Verbal consent obtained  Consent given by: patient  Timeout called at: 10/18/2022 1:56 PM   Patient understanding: patient states understanding of the procedure being performed  Patient consent: the patient's understanding of the procedure matches consent given  Patient identity confirmed: verbally with patient        Patient location:  Clinic  Location:     Laterality:  Right    Location:  Lower extremity    Lower extremity location:  Leg    Leg location:  R lower leg  Procedure details: Tools used:  Suture removal kit    Wound appearance:  No sign(s) of infection    Number of sutures removed:  5        Some portions of this record may have been generated with voice recognition software  There may be translation, syntax, or grammatical errors  Occasional wrong word or "sound-a-like" substitutions may have occurred due to the inherent limitations of the voice recognition software  Read the chart carefully and recognize, using context, where substations may have occurred  If you have any questions, please contact the dictating provider for clarification or correction, as needed

## 2022-10-20 ENCOUNTER — ANNUAL EXAM (OUTPATIENT)
Dept: OBGYN CLINIC | Facility: MEDICAL CENTER | Age: 82
End: 2022-10-20

## 2022-10-20 VITALS
DIASTOLIC BLOOD PRESSURE: 78 MMHG | HEIGHT: 59 IN | SYSTOLIC BLOOD PRESSURE: 110 MMHG | WEIGHT: 141.4 LBS | BODY MASS INDEX: 28.51 KG/M2

## 2022-10-20 DIAGNOSIS — Z01.419 ENCOUNTER FOR ANNUAL ROUTINE GYNECOLOGICAL EXAMINATION: Primary | ICD-10-CM

## 2022-10-20 NOTE — PROGRESS NOTES
Assessment/Plan:    80 yo  - annual exam      Problem List Items Addressed This Visit    None     Visit Diagnoses     Encounter for annual routine gynecological examination    -  Primary  Pap not indicated  mammo scheduled  No further colonoscopies  Asymptomatic prolapse  F/u in 2 yrs per medicare            Subjective:      Patient ID:     This is a 79 y o  postmenopausal  who presents for annual exam      Prolapse is good, no longer has pessary in  She denies vaginal bleeding, discharge, or pelvic pain  Sexually active: not currently  STD testing: no  Urinary concerns: no  Bowel movements: no changes     Screening:  Last pap smear: 17-neg/neg  Last mammogram: 6/1/15-normal, next one scheduled for   Colon Cancer screening: doesn't remember when, was normal per patient  DEXA: 6/1/15-osteoporosis, next one scheduled for     Family history:  Breast cancer: none  Colon cancer: none  Ovarian cancer: none    There is no height or weight on file to calculate BMI  Exercise: decreased recently, work around the house  Diet:  Tries to eat healthy  Smoking: non smoker  The following portions of the patient's history were reviewed and updated as appropriate: allergies, current medications, past family history, past medical history, past social history, past surgical history and problem list     Review of Systems   Constitutional: Negative  HENT: Negative  Eyes: Negative  Respiratory: Negative  Cardiovascular: Negative  Gastrointestinal: Negative  Endocrine: Negative  Genitourinary: Negative for dyspareunia, dysuria, frequency, menstrual problem, pelvic pain, vaginal discharge and vaginal pain  Musculoskeletal: Negative  Skin: Negative  Allergic/Immunologic: Negative  Neurological: Negative  Hematological: Negative  Psychiatric/Behavioral: Negative            Objective:      /78 (BP Location: Right arm, Patient Position: Sitting, Cuff Size: Adult)  4' 10 5" (1 486 m)   Wt 64 1 kg (141 lb 6 4 oz)   BMI 29 05 kg/m²          Physical Exam  Vitals reviewed  Cardiovascular:      Rate and Rhythm: Normal rate  Pulmonary:      Effort: Pulmonary effort is normal    Chest:   Breasts: Breasts are symmetrical       Right: No mass, nipple discharge, skin change or tenderness  Left: No mass, nipple discharge, skin change or tenderness  Abdominal:      Palpations: Abdomen is soft  Genitourinary:     Labia:         Right: No rash  Left: No rash  Vagina: Normal  No signs of injury  Cervix: No cervical motion tenderness, discharge, friability or lesion  Uterus: With uterine prolapse  Not deviated, not enlarged, not fixed and not tender  Adnexa:         Right: No mass, tenderness or fullness  Left: No mass, tenderness or fullness  Comments: +atrophy  Musculoskeletal:      Cervical back: Normal range of motion  Skin:     General: Skin is warm and dry  Neurological:      Mental Status: She is alert and oriented to person, place, and time

## 2022-10-29 ENCOUNTER — OFFICE VISIT (OUTPATIENT)
Dept: URGENT CARE | Facility: MEDICAL CENTER | Age: 82
End: 2022-10-29
Payer: MEDICARE

## 2022-10-29 VITALS
BODY MASS INDEX: 29.79 KG/M2 | OXYGEN SATURATION: 96 % | TEMPERATURE: 98.1 F | HEART RATE: 71 BPM | RESPIRATION RATE: 14 BRPM | WEIGHT: 145 LBS

## 2022-10-29 DIAGNOSIS — S81.801A OPEN WOUND OF RIGHT LOWER EXTREMITY, INITIAL ENCOUNTER: Primary | ICD-10-CM

## 2022-10-29 PROCEDURE — G0463 HOSPITAL OUTPT CLINIC VISIT: HCPCS | Performed by: FAMILY MEDICINE

## 2022-10-29 PROCEDURE — 99213 OFFICE O/P EST LOW 20 MIN: CPT | Performed by: FAMILY MEDICINE

## 2022-10-29 RX ORDER — CEPHALEXIN 500 MG/1
500 CAPSULE ORAL EVERY 12 HOURS SCHEDULED
Qty: 14 CAPSULE | Refills: 0 | Status: SHIPPED | OUTPATIENT
Start: 2022-10-29 | End: 2022-11-05

## 2022-10-29 NOTE — PROGRESS NOTES
[facility]    NAME: Josiah Vazquez is a 79 y o  female  : 1940    MRN: [id number]  DATE: 2022  TIME: 5:49 PM    Assessment and Plan   Open wound of right lower extremity, initial encounter [S81 801A]  1  Open wound of right lower extremity, initial encounter  Ambulatory Referral to Wound Care    cephalexin (KEFLEX) 500 mg capsule     Surrounding erythema with open wound  Start a trial of Keflex   Advised patient to avoid Neosporin, bacitracin, triple ointment  Wound care referral placed  Provided patient with precautionary measures     Patient Instructions   There are no Patient Instructions on file for this visit  Follow up with PCP in 3-5 days  Proceed to ER if symptoms worsen  Chief Complaint     Chief Complaint   Patient presents with   • Wound Check     Pt had sutures removed from RLE laceration  Wound reopened, no bleeding  Wound bed with 40% slough  Johnna wound pink, no warmth to touch  Denies fever and chills  Intermittent pain  Keeping wound covered and applying neosporin  History of Present Illness   HPI   79-year-old female with multiple medical illnesses who presented for evaluation of right lower extremity wound status post suture removal   She reported the wound has been increasing in size since the suture removal   She has been applying Neosporin to the area  No other symptoms or complaints reported by the patient  Review of Systems   Review of Systems   All other systems reviewed and are negative  The following portions of the patient's history were reviewed and updated as appropriate: allergies, current medications, past family history, past medical history, past social history, past surgical history and problem list      Medications have been verified  Objective   Pulse 71   Temp 98 1 °F (36 7 °C)   Resp 14   Wt 65 8 kg (145 lb)   SpO2 96%   BMI 29 79 kg/m²     Physical Exam  Vitals reviewed     Constitutional:       General: She is not in acute distress  Appearance: Normal appearance  She is not ill-appearing  Skin:     Comments: Right lower extremity open wound with surrounding erythema just distal to the tibial tuberosity, no noted fluctuations or tracking marks   Neurological:      Mental Status: She is alert         Dierdre Drafts

## 2022-11-01 ENCOUNTER — REMOTE DEVICE CLINIC VISIT (OUTPATIENT)
Dept: CARDIOLOGY CLINIC | Facility: CLINIC | Age: 82
End: 2022-11-01

## 2022-11-01 ENCOUNTER — OFFICE VISIT (OUTPATIENT)
Dept: WOUND CARE | Facility: CLINIC | Age: 82
End: 2022-11-01

## 2022-11-01 VITALS
RESPIRATION RATE: 18 BRPM | SYSTOLIC BLOOD PRESSURE: 113 MMHG | DIASTOLIC BLOOD PRESSURE: 71 MMHG | TEMPERATURE: 97.4 F | HEART RATE: 93 BPM

## 2022-11-01 DIAGNOSIS — Z95.0 PRESENCE OF PERMANENT CARDIAC PACEMAKER: Primary | ICD-10-CM

## 2022-11-01 DIAGNOSIS — S81.801A TRAUMATIC OPEN WOUND OF RIGHT LOWER LEG, INITIAL ENCOUNTER: Primary | ICD-10-CM

## 2022-11-01 DIAGNOSIS — I73.9 PAD (PERIPHERAL ARTERY DISEASE) (HCC): ICD-10-CM

## 2022-11-01 RX ORDER — LIDOCAINE HYDROCHLORIDE 40 MG/ML
5 SOLUTION TOPICAL ONCE
Status: COMPLETED | OUTPATIENT
Start: 2022-11-01 | End: 2022-11-01

## 2022-11-01 RX ADMIN — LIDOCAINE HYDROCHLORIDE 5 ML: 40 SOLUTION TOPICAL at 11:06

## 2022-11-01 NOTE — PROGRESS NOTES
Patient ID: Renny Samaniego is a 79 y o  female Date of Birth 1940     Chief Complaint   Patient presents with   • New Patient Visit     Right leg wound       Allergies:  Sulfa antibiotics, Formaldehyde, and Codeine    Diagnosis:      Diagnosis ICD-10-CM Associated Orders   1  Traumatic open wound of right lower leg, initial encounter  S81 801A lidocaine (XYLOCAINE) 4 % topical solution 5 mL     Wound cleansing and dressings     Debridement   2  PAD (peripheral artery disease) ([facility])  I73 9         Assessment and Plan :  • Initial Evaluation of open traumatic wound on RLE  No signs of infection today  • Debrided as below  • Wound management with Hydrofera Blue, see wound orders below   • Do not wet nor submerge in water  • Can cleanse with normal saline at dressing changes  • Counseled on importance of frequent elevation of leg and increase exercise/walking for wound healing  • Counseled on adequate protein intake (chicken, fish, yogurt, eggs and nuts), 3-4 servings per day to expedite wound healing  • Pt to continue to f/u with Dr Eileen Salazar, vascular surgery   • Followup in  1 week(s) or call sooner with questions or concerns or any signs of infection such as redness, swelling, increased/purulent drainage, fever, chills, increased severe pain  • VAS lower limb arterial duplex bilateral from 6/16/22:   Impression:  RIGHT LOWER LIMB:  Occlusion at the origin of the superficial femoral artery with reconstitution of the popliteal artery  Known posterior tibial arterial is occluded  The anterior tibial artery is  patent  Ankle/Brachial index: 0 63 , Prior 0 44  PVR/ PPG tracings are dampened  Metatarsal pressure of 53 mm Hg  Great toe pressure of 49 mm Hg, below the healing range  Prior 30 mm Hg    Pedal Acceleration time: Dorsal:  120 ms     Plantar: 200 ms     LEFT LOWER LIMB:  Diffuse disease of the femoral popliteal segment with a short segment occlusion of the mid to distal superficial femoral artery vs high grade stenosis  The posterior tibial and anterior tibial arteries are patent  Ankle/Brachial index: 0 50 , Prior 0 42  PVR/ PPG tracings are dampened  Metatarsal pressure of 44 mm Hg  Great toe pressure of 97 mm Hg, below the healing range  Prior 0 mm Hg  Pedal Acceleration time: Dorsal: 120 ms     Plantar: 130 ms    Subjective:   Pt is an 79 y o  Female with pmhx HTN, HLD, CAD s/p CABG x4 (2011, on Effient), Afib (on Xarelto), Tachy-jillian syndrome, PAD, BLE Edema, Depression, Renal artery stenosis, Pulmonary HTN, Sick sinus syndrome, Memory loss,  Mild cognitive impairment, b/l Carotid stenosis, Seizure like activity, Cervical spondylosis with cervical radiculopathy who presents for initial eval of RLE open traumatic wound which has been present since 10/3/22 after a fall  Pt was treated in ED, received Tdap vaccine and laceration was repaired with sutures  Pt then followed up with PCP who removed sutures on 10/18/22 and was started on a courses of antibiotics: Keflex on 10/29/22 for laura-wound erythema  Pt has been applying vaseline on the wound bed  Does not have an odor  No diabetes   No smoking, ETOH or drug use  Pt denies any sob, fatigue, N/V, CP, fever or chills  Pt is accompanied by her  who is actively involved in her care        The following portions of the patient's history were reviewed and updated as appropriate:   Patient Active Problem List   Diagnosis   • Essential hypertension   • 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)   • A-fib (HCC)   • 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HCC)   • PAD (peripheral artery disease) (HCC)   • Abnormal liver enzymes   • Edema of left upper extremity   • Urinary retention   • Seizure-like activity (HCC)   • Hyperlipidemia   • Toxic metabolic encephalopathy   • Depression   • Current use of long term anticoagulation   • Long term current use of amiodarone   • Renal artery stenosis (HCC)   • Pulmonary hypertension (HCC)   • Sick sinus syndrome ([facility] )   • Hx of CABG   • CAD (coronary artery disease)   • Memory loss   • Mild cognitive impairment   • Atherosclerosis with claudication of extremity (HCC)   • Carotid stenosis, asymptomatic, bilateral   • Acute (reversible) ischemia of small intestine, extent unspecified (HCC)   • Cervical disc disorder with radiculopathy of mid-cervical region   • Cervical spondylosis   • Fall     Past Medical History:   Diagnosis Date   • Anal fissure    • Cardiac disorder    • Cognitive changes 12/23/2020   • Esophageal reflux    • Esophagitis, reflux    • Hemorrhoids    • Hepatic hemangioma     Last Assessed: 1/13/2015   • Herpes zoster    • History of colonic polyps    • Hypertension    • Ischemic colitis ([place] [address] )    • Lumbar herniated disc    • Malignant neoplasm without specification of site [facility])    • Nephrolithiasis     L  Lithotripsy   • Nontoxic single thyroid nodule     Last Assessed: 1/13/2015   • Osteoarthritis    • Overactive bladder    • Raynaud disease    • Respiratory system disease    • Sjogren's disease ([place] [address] )    • Spinal stenosis    • PONCHO (stress urinary incontinence, female)    • Uterovaginal prolapse     Grade I-II     Past Surgical History:   Procedure Laterality Date   • APPENDECTOMY  1947   • CARDIAC PACEMAKER PLACEMENT  01/2021   • CARDIAC SURGERY      CABG   • CATARACT EXTRACTION Bilateral    • COLONOSCOPY  2012    Fiberoptic   • COLONOSCOPY      Resolved: 2006 - 2012 5 year f/u   • CORONARY ANGIOPLASTY WITH STENT PLACEMENT     • CORONARY ARTERY BYPASS GRAFT      Resolved: 2012   • ESOPHAGOGASTRODUODENOSCOPY  2012    Diagnostic   • HEMORROIDECTOMY     • KNEE SURGERY     • LITHOTRIPSY      Renal   • MALIGNANT SKIN LESION EXCISION      Face; Resolved: 2004   • [place] ESOPHAGOGASTRODUODENOSCOPY TRANSORAL DIAGNOSTIC N/A 4/13/2016    Procedure: EGD AND COLONOSCOPY;  Surgeon: Chucky Majano MD;  [facility];   Service: Gastroenterology   • RENAL ARTERY STENT     • SKIN LESION EXCISION      Scalp   • SOFT TISSUE TUMOR RESECTION      Shoulder; Resolved: 1995   • THROMBOLYSIS      Postoperative Thrombolysis PTCA   • TONSILLECTOMY      Resolved: 1944     Family History   Problem Relation Age of Onset   • Heart disease Mother    • Hypertension Mother    • Osteoporosis Mother    • Heart disease Father    • Hypertension Father    • No Known Problems Sister    • Heart disease Brother    • Diabetes Brother    • No Known Problems Daughter    • No Known Problems Son    • No Known Problems Maternal Grandmother    • No Known Problems Maternal Grandfather    • No Known Problems Paternal Grandmother    • No Known Problems Paternal Grandfather    • Ulcerative colitis Family    • Colon polyps Family    • Breast cancer Neg Hx    • Colon cancer Neg Hx    • Ovarian cancer Neg Hx       Social History     Socioeconomic History   • Marital status: /Civil Union     Spouse name: None   • Number of children: None   • Years of education: None   • Highest education level: None   Occupational History   • Occupation: retired   Tobacco Use   • Smoking status: Never Smoker   • Smokeless tobacco: Never Used   Vaping Use   • Vaping Use: Never used   Substance and Sexual Activity   • Alcohol use:  Yes     Alcohol/week: 1 0 standard drink     Types: 1 Glasses of wine per week     Comment: occas   • Drug use: Never   • Sexual activity: Not Currently   Other Topics Concern   • None   Social History Narrative    Activities: golf    Caffeine use    Consumes healthy diet    Daily caffeinated coffee consumption: 1 cup per day    Drinks caffeinated tea: 1 cup per day    Well balanced diet     Social Determinants of Health     Financial Resource Strain: Not on file   Food Insecurity: Not on file   Transportation Needs: Not on file   Physical Activity: Not on file   Stress: Not on file   Social Connections: Not on file   Intimate Partner Violence: Not on file   Housing Stability: Not on file        Current Outpatient Medications:   •  acetaminophen (TYLENOL) 325 mg tablet, Take 2 tablets (650 mg total) by mouth every 6 (six) hours as needed for mild pain, Disp: , Rfl: 0  •  amiodarone 200 mg tablet, Take 0 5 tablets (100 mg total) by mouth daily with breakfast, Disp: 45 tablet, Rfl: 3  •  atorvastatin (LIPITOR) 20 mg tablet, TAKE 1 TABLET BY MOUTH EVERY DAY WITH DINNER, Disp: 30 tablet, Rfl: 8  •  bumetanide (BUMEX) 2 mg tablet, TAKE 1 TABLET BY MOUTH EVERY DAY, Disp: 30 tablet, Rfl: 8  •  cephalexin (KEFLEX) 500 mg capsule, Take 1 capsule (500 mg total) by mouth every 12 (twelve) hours for 7 days, Disp: 14 capsule, Rfl: 0  •  metoprolol succinate (TOPROL-XL) 25 mg 24 hr tablet, TAKE 1 TABLET BY MOUTH EVERY DAY, Disp: 30 tablet, Rfl: 8  •  multivitamin (THERAGRAN) TABS, Take 1 tablet by mouth daily  , Disp: , Rfl:   •  nitroglycerin (NITROSTAT) 0 4 mg SL tablet, Place 1 tablet (0 4 mg total) under the tongue every 5 (five) minutes as needed for chest pain, Disp: 25 tablet, Rfl: 0  •  prasugrel (EFFIENT) tablet, TAKE 1 TABLET BY MOUTH EVERY DAY, Disp: 30 tablet, Rfl: 11  •  sertraline (ZOLOFT) 25 mg tablet, TAKE 1 TABLET BY MOUTH EVERY DAY, Disp: 90 tablet, Rfl: 0  •  Xarelto 15 MG tablet, TAKE 1 TABLET (15 MG TOTAL) BY MOUTH DAILY WITH DINNER, Disp: 30 tablet, Rfl: 8  •  amLODIPine (NORVASC) 5 mg tablet, Take 1 tablet (5 mg total) by mouth daily (Patient not taking: Reported on 11/1/2022), Disp: 90 tablet, Rfl: 3  •  gabapentin (NEURONTIN) 100 mg capsule, Take 1 tablet at bedtime  May increase to 2 tablets after 3 days (Patient taking differently: 100 mg 2 (two) times a day Take 1 tablet at bedtime    May increase to 2 tablets after 3 days), Disp: 60 capsule, Rfl: 1  •  levETIRAcetam (KEPPRA) 500 mg tablet, Take 1 tablet (500 mg total) by mouth every 12 (twelve) hours, Disp: 60 tablet, Rfl: 3  •  magnesium oxide (MAG-OX) 400 mg, Take 1 tablet (400 mg total) by mouth 2 (two) times a day before lunch and dinner (Patient not taking: Reported on 11/1/2022), Disp: , Rfl: 0  •  methocarbamol (ROBAXIN) 500 mg tablet, Take 1 tablet (500 mg total) by mouth 3 (three) times a day, Disp: 30 tablet, Rfl: 1  •  potassium chloride (K-DUR,KLOR-CON) 20 mEq tablet, Take 2 tablets (40 mEq total) by mouth daily (Patient not taking: Reported on 11/1/2022), Disp: 60 tablet, Rfl: 8    Review of Systems   Constitutional: Negative for appetite change, chills, fatigue, fever and unexpected weight change  HENT: Negative for congestion, hearing loss, postnasal drip and sinus pressure  Eyes: Negative for discharge and visual disturbance  Respiratory: Negative for cough and shortness of breath  Cardiovascular: Negative for chest pain, palpitations and leg swelling  Gastrointestinal: Negative for abdominal pain, blood in stool, constipation, diarrhea and nausea  Endocrine: Negative  Genitourinary: Negative for difficulty urinating, dysuria and urgency  Musculoskeletal: Negative for back pain  Skin: Positive for wound (RLE)  Negative for rash  Allergic/Immunologic: Negative  Neurological: Negative for dizziness, tremors, seizures, weakness, numbness and headaches  Hematological: Does not bruise/bleed easily  Psychiatric/Behavioral: Negative  Negative for dysphoric mood  The patient is not nervous/anxious  Objective:  /71   Pulse 93   Temp (!) 97 4 °F (36 3 °C)   Resp 18   Pain Score:   3     Physical Exam  Vitals and nursing note reviewed  Constitutional:       General: She is not in acute distress  Appearance: Normal appearance  She is well-developed  She is obese  HENT:      Head: Normocephalic and atraumatic  Right Ear: External ear normal       Left Ear: External ear normal       Mouth/Throat:      Comments: masked  Eyes:      General: Lids are normal          Right eye: No discharge  Left eye: No discharge  Extraocular Movements: Extraocular movements intact        Conjunctiva/sclera: Conjunctivae normal       Pupils: Pupils are equal, round, and reactive to light  Cardiovascular:      Rate and Rhythm: Normal rate  Pulses:           Dorsalis pedis pulses are detected w/ Doppler on the right side and detected w/ Doppler on the left side  Heart sounds: Normal heart sounds  No murmur heard  No friction rub  No gallop  Pulmonary:      Effort: Pulmonary effort is normal       Breath sounds: Normal breath sounds and air entry  Abdominal:      General: Abdomen is flat  Palpations: Abdomen is soft  Tenderness: There is no abdominal tenderness  There is no guarding or rebound  Musculoskeletal:         General: No deformity  Cervical back: Normal range of motion  Right lower leg: Edema present  Left lower leg: No edema  Skin:     General: Skin is warm and dry  Capillary Refill: Capillary refill takes less than 2 seconds  Findings: Wound (RLE) present  Comments: Yellow adherent slough on wound bed  See wound assessment   Neurological:      General: No focal deficit present  Mental Status: She is alert and oriented to person, place, and time  Gait: Gait is intact  Psychiatric:         Mood and Affect: Mood and affect normal          Speech: Speech normal          Behavior: Behavior normal  Behavior is cooperative  Thought Content:  Thought content normal          Judgment: Judgment normal              Wound 11/01/22 Traumatic Pretibial Proximal;Right (Active)   Wound Description Yellow;Pink;Epithelialization 11/01/22 1033   Johnna-wound Assessment Clean;Pink 11/01/22 1033   Wound Length (cm) 1 5 cm 11/01/22 1033   Wound Width (cm) 5 5 cm 11/01/22 1033   Wound Depth (cm) 0 1 cm 11/01/22 1033   Wound Surface Area (cm^2) 8 25 cm^2 11/01/22 1033   Wound Volume (cm^3) 0 825 cm^3 11/01/22 1033   Calculated Wound Volume (cm^3) 0 83 cm^3 11/01/22 1033   Drainage Amount Moderate 11/01/22 1033   Drainage Description Serosanguineous 11/01/22 1033   Non-staged Wound Description Full thickness 11/01/22 1033   Dressing Status Intact 11/01/22 1033     Debridement   Wound 11/01/22 Traumatic Pretibial Proximal;Right    Universal Protocol:  Consent: Verbal consent obtained  Written consent obtained  Risks and benefits: risks, benefits and alternatives were discussed  Consent given by: patient  Time out: Immediately prior to procedure a "time out" was called to verify the correct patient, procedure, equipment, support staff and site/side marked as required  Patient understanding: patient states understanding of the procedure being performed  Patient identity confirmed: verbally with patient      Performed by: PA  Debridement type: surgical  Level of debridement: subcutaneous tissue  Pain control: lidocaine 4%  Post-debridement measurements  Length (cm): 1 5  Width (cm): 5 5  Depth (cm): 0 2  Percent debrided: 100%  Surface Area (cm^2): 8 25  Area debrided (cm^2): 8 25  Volume (cm^3): 1 65  Tissue and other material debrided: subcutaneous tissue  Devitalized tissue debrided: biofilm, exudate, fibrin and slough  Instrument(s) utilized: curette  Bleeding: small  Hemostasis obtained with: pressure  Procedural pain (0-10): 0  Post-procedural pain: 0   Response to treatment: procedure was tolerated well                        Wound Instructions:  Orders Placed This Encounter   Procedures   • Wound cleansing and dressings     Right Leg wound    Wash your hands with soap and water  Remove old dressing, discard into plastic bag and place in trash  Cleanse the wound with normal saline  prior to applying a clean dressing  Dakins soak for 5 minutes done today only  Do not use tissue or cotton balls  Do not scrub the wound  Pat dry using gauze  Shower no keep wound clean and dry    Apply Hydroferra blue to the woundbed   The small superficial area apply dermagran   Cover with gauze and Juanita Secure with tape  Change dressing every other day    Follow up with Dr Carleen Peralta the vascular doctor to feliciano circulation    Supplies sent to [facility] call if don't receive supplies by Friday phone # [phone]     Standing Status:   Future     Standing Expiration Date:   11/1/2023   • Debridement     This order was created via procedure documentation       Total time spent today:  30 minutes  This includes reviewing the patient's chart, pertinent physician records [facility], Emergency Medicine, 10/3/22, Dr Nathalie Salazar, Family Medicine, 10/18/22, Dr Freida Mendoza, Family Medicine on 10/29/22, Dr Merlyn Mondragon, Vascular Surgery 12/16/21 and VAS BLE Arterial duplex 6/16/22  Rahul Miller PA-C, [facility]    Portions of the record may have been created with voice recognition software  Occasional wrong word or "sound alike" substitutions may have occurred due to the inherent limitations of voice recognition software  Read the chart carefully and recognize, using context, where substitutions have occurred

## 2022-11-01 NOTE — PROGRESS NOTES
MDT-DUAL CHAMBER PPM (DDDR MODE)/ACTIVE SYSTEM IS MRI CONDITIONAL   CARELINK TRANSMISSION:  BATTERY VOLTAGE ADEQUATE (8 8 YR)   AP 95 5%   99 9% (>40%/LBBB/DDDR 70/HIS)    ALL LEAD PARAMETERS WITHIN NORMAL LIMITS   NO SIGNIFICANT HIGH RATE EPISODES   NORMAL DEVICE FUNCTION   ES

## 2022-11-01 NOTE — PATIENT INSTRUCTIONS
Orders Placed This Encounter   Procedures    Wound cleansing and dressings     Right Leg wound    Wash your hands with soap and water  Remove old dressing, discard into plastic bag and place in trash  Cleanse the wound with normal saline  prior to applying a clean dressing  Dakins soak for 5 minutes done today only  Do not use tissue or cotton balls  Do not scrub the wound  Pat dry using gauze  Shower no keep wound clean and dry    Apply Hydroferra blue to the woundbed   The small superficial area apply dermagran   Cover with gauze and Juanita Secure with tape  Change dressing every other day    Follow up with Dr Debi Maya the vascular doctor to eval circulation    Supplies sent to [facility] call if don't receive supplies by Friday phone # [phone]     Standing Status:   Future     Standing Expiration Date:   11/1/2023

## 2022-11-08 ENCOUNTER — OFFICE VISIT (OUTPATIENT)
Dept: WOUND CARE | Facility: CLINIC | Age: 82
End: 2022-11-08

## 2022-11-08 VITALS
TEMPERATURE: 96.2 F | SYSTOLIC BLOOD PRESSURE: 124 MMHG | RESPIRATION RATE: 18 BRPM | DIASTOLIC BLOOD PRESSURE: 70 MMHG | HEART RATE: 90 BPM

## 2022-11-08 DIAGNOSIS — I73.9 PAD (PERIPHERAL ARTERY DISEASE) (HCC): ICD-10-CM

## 2022-11-08 DIAGNOSIS — S81.801A TRAUMATIC OPEN WOUND OF RIGHT LOWER LEG, INITIAL ENCOUNTER: Primary | ICD-10-CM

## 2022-11-08 RX ORDER — LIDOCAINE 40 MG/G
CREAM TOPICAL ONCE
Status: COMPLETED | OUTPATIENT
Start: 2022-11-08 | End: 2022-11-08

## 2022-11-08 RX ADMIN — LIDOCAINE: 40 CREAM TOPICAL at 11:38

## 2022-11-08 NOTE — PATIENT INSTRUCTIONS
Orders Placed This Encounter   Procedures    Wound cleansing and dressings     Right Leg wound     Wash your hands with soap and water  Remove old dressing, discard into plastic bag and place in trash  Cleanse the wound with normal saline  prior to applying a clean dressing  Do not use tissue or cotton balls  Do not scrub the wound  Pat dry using gauze  Shower no keep wound clean and dry     Apply polymen over the wound     Cover with gauze and Juanita Secure with tape  Change dressing every other day     Follow up with Dr Alejandra Ely the vascular doctor to eval circulation     Standing Status:   Future     Standing Expiration Date:   11/8/2023

## 2022-11-08 NOTE — PROGRESS NOTES
Patient ID: Marta Bello is a 79 y o  female Date of Birth 1940       Chief Complaint   Patient presents with   • Follow Up Wound Care Visit     Traumatic open wound right leg       Allergies:  Sulfa antibiotics, Formaldehyde, and Codeine    Diagnosis:   Diagnosis ICD-10-CM Associated Orders   1  Traumatic open wound of right lower leg, initial encounter  S81 [id number] lidocaine (LMX) 4 % cream     Wound cleansing and dressings     Debridement   2  PAD (peripheral artery disease) ([facility])  I73 9         Assessment and Plan :  · F/u evaluation of open traumatic wound on RLE slowly healing  · Debrided as below  · Change wound management to Polymem, see wound orders below   · Do not wet nor submerge in water  · Can cleanse with normal saline at dressing changes  · Continue frequent elevation of leg and increase exercise/walking for wound healing  · Continue adequate protein intake (chicken, fish, yogurt, eggs and nuts), 3-4 servings per day to expedite wound healing  · Pt to continue to f/u with Dr Adelso Muñoz, vascular surgery   · Followup in  1 week(s) or call sooner with questions or concerns or any signs of infection such as redness, swelling, increased/purulent drainage, fever, chills, increased severe pain  Subjective:   11/1/22  Pt is an 79 y o  Female with pmhx HTN, HLD, CAD s/p CABG x4 (2011, on Effient), Afib (on Xarelto), Tachy-jillian syndrome, PAD, BLE Edema, Depression, Renal artery stenosis, Pulmonary HTN, Sick sinus syndrome, Memory loss, Mild cognitive impairment, b/l Carotid stenosis, Seizure like activity, Cervical spondylosis with cervical radiculopathy who presents for initial eval of RLE open traumatic wound which has been present since 10/3/22 after a fall  Pt was treated in ED, received Tdap vaccine and laceration was repaired with sutures  Pt then followed up with PCP who removed sutures on 10/18/22 and was started on a courses of antibiotics: Keflex on 10/29/22 for laura-wound erythema  Pt has been applying vaseline on the wound bed  Does not have an odor  No diabetes   No smoking, ETOH or drug use  Pt denies any sob, fatigue, N/V, CP, fever or chills  Pt is accompanied by her  who is actively involved in her care  11/8/22:  Patient presents for followup evaluation of RLE open traumatic wound accompanied by her   Has been using Hydrofera Blue on the wound bed securing with Spandifast  Pt denies any fever or chills        The following portions of the patient's history were reviewed and updated as appropriate:   Patient Active Problem List   Diagnosis   • Essential hypertension   • 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)   • A-fib (HCC)   • 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HCC)   • PAD (peripheral artery disease) (HCC)   • Abnormal liver enzymes   • Edema of left upper extremity   • Urinary retention   • Seizure-like activity (HCC)   • Hyperlipidemia   • Toxic metabolic encephalopathy   • Depression   • Current use of long term anticoagulation   • Long term current use of amiodarone   • Renal artery stenosis (HCC)   • Pulmonary hypertension (HCC)   • Sick sinus syndrome (HCC)   • Hx of CABG   • CAD (coronary artery disease)   • Memory loss   • Mild cognitive impairment   • Atherosclerosis with claudication of extremity (HCC)   • Carotid stenosis, asymptomatic, bilateral   • Acute (reversible) ischemia of small intestine, extent unspecified (HCC)   • Cervical disc disorder with radiculopathy of mid-cervical region   • Cervical spondylosis   • Fall     Past Medical History:   Diagnosis Date   • Anal fissure    • Cardiac disorder    • Cognitive changes 12/23/2020   • Esophageal reflux    • Esophagitis, reflux    • Hemorrhoids    • Hepatic hemangioma     Last Assessed: 1/13/2015   • Herpes zoster    • History of colonic polyps    • Hypertension    • Ischemic colitis ([facility] [address] )    • Lumbar herniated disc    • Malignant neoplasm without specification of site [facility])    • Nephrolithiasis     L  Lithotripsy   • Nontoxic single thyroid nodule     Last Assessed: 1/13/2015   • Osteoarthritis    • Overactive bladder    • Raynaud disease    • Respiratory system disease    • Sjogren's disease ([address] )    • Spinal stenosis    • PONCHO (stress urinary incontinence, female)    • Uterovaginal prolapse     Grade I-II     Past Surgical History:   Procedure Laterality Date   • APPENDECTOMY  1947   • CARDIAC PACEMAKER PLACEMENT  01/2021   • CARDIAC SURGERY      CABG   • CATARACT EXTRACTION Bilateral    • COLONOSCOPY  2012    Fiberoptic   • COLONOSCOPY      Resolved: 2006 - 2012 5 year f/u   • CORONARY ANGIOPLASTY WITH STENT PLACEMENT     • CORONARY ARTERY BYPASS GRAFT      Resolved: 2012   • ESOPHAGOGASTRODUODENOSCOPY  2012    Diagnostic   • HEMORROIDECTOMY     • KNEE SURGERY     • LITHOTRIPSY      Renal   • MALIGNANT SKIN LESION EXCISION      Face; Resolved: 2004   • SD ESOPHAGOGASTRODUODENOSCOPY TRANSORAL DIAGNOSTIC N/A 4/13/2016    Procedure: EGD AND COLONOSCOPY;  Surgeon: Jose Arana MD;  Location: [facility];   Service: Gastroenterology   • RENAL ARTERY STENT     • SKIN LESION EXCISION      Scalp   • SOFT TISSUE TUMOR RESECTION      Shoulder; Resolved: 1995   • THROMBOLYSIS      Postoperative Thrombolysis PTCA   • TONSILLECTOMY      Resolved: 1944     Family History   Problem Relation Age of Onset   • Heart disease Mother    • Hypertension Mother    • Osteoporosis Mother    • Heart disease Father    • Hypertension Father    • No Known Problems Sister    • Heart disease Brother    • Diabetes Brother    • No Known Problems Daughter    • No Known Problems Son    • No Known Problems Maternal Grandmother    • No Known Problems Maternal Grandfather    • No Known Problems Paternal Grandmother    • No Known Problems Paternal Grandfather    • Ulcerative colitis Family    • Colon polyps Family    • Breast cancer Neg Hx    • Colon cancer Neg Hx    • Ovarian cancer Neg Hx      Social History     Socioeconomic History   • Marital status: /Civil Union     Spouse name: Not on file   • Number of children: Not on file   • Years of education: Not on file   • Highest education level: Not on file   Occupational History   • Occupation: retired   Tobacco Use   • Smoking status: Never Smoker   • Smokeless tobacco: Never Used   Vaping Use   • Vaping Use: Never used   Substance and Sexual Activity   • Alcohol use: Yes     Alcohol/week: 1 0 standard drink     Types: 1 Glasses of wine per week     Comment: occas   • Drug use: Never   • Sexual activity: Not Currently   Other Topics Concern   • Not on file   Social History Narrative    Activities: golf    Caffeine use    Consumes healthy diet    Daily caffeinated coffee consumption: 1 cup per day    Drinks caffeinated tea: 1 cup per day    Well balanced diet     Social Determinants of Health     Financial Resource Strain: Not on file   Food Insecurity: Not on file   Transportation Needs: Not on file   Physical Activity: Not on file   Stress: Not on file   Social Connections: Not on file   Intimate Partner Violence: Not on file   Housing Stability: Not on file       Current Outpatient Medications:   •  acetaminophen (TYLENOL) 325 mg tablet, Take 2 tablets (650 mg total) by mouth every 6 (six) hours as needed for mild pain, Disp: , Rfl: 0  •  amiodarone 200 mg tablet, Take 0 5 tablets (100 mg total) by mouth daily with breakfast, Disp: 45 tablet, Rfl: 3  •  amLODIPine (NORVASC) 5 mg tablet, Take 1 tablet (5 mg total) by mouth daily (Patient not taking: Reported on 11/1/2022), Disp: 90 tablet, Rfl: 3  •  atorvastatin (LIPITOR) 20 mg tablet, TAKE 1 TABLET BY MOUTH EVERY DAY WITH DINNER, Disp: 30 tablet, Rfl: 8  •  bumetanide (BUMEX) 2 mg tablet, TAKE 1 TABLET BY MOUTH EVERY DAY, Disp: 30 tablet, Rfl: 8  •  gabapentin (NEURONTIN) 100 mg capsule, Take 1 tablet at bedtime    May increase to 2 tablets after 3 days (Patient taking differently: 100 mg 2 (two) times a day Take 1 tablet at bedtime  May increase to 2 tablets after 3 days), Disp: 60 capsule, Rfl: 1  •  levETIRAcetam (KEPPRA) 500 mg tablet, Take 1 tablet (500 mg total) by mouth every 12 (twelve) hours, Disp: 60 tablet, Rfl: 3  •  magnesium oxide (MAG-OX) 400 mg, Take 1 tablet (400 mg total) by mouth 2 (two) times a day before lunch and dinner (Patient not taking: Reported on 11/1/2022), Disp: , Rfl: 0  •  methocarbamol (ROBAXIN) 500 mg tablet, Take 1 tablet (500 mg total) by mouth 3 (three) times a day, Disp: 30 tablet, Rfl: 1  •  metoprolol succinate (TOPROL-XL) 25 mg 24 hr tablet, TAKE 1 TABLET BY MOUTH EVERY DAY, Disp: 30 tablet, Rfl: 8  •  multivitamin (THERAGRAN) TABS, Take 1 tablet by mouth daily  , Disp: , Rfl:   •  nitroglycerin (NITROSTAT) 0 4 mg SL tablet, Place 1 tablet (0 4 mg total) under the tongue every 5 (five) minutes as needed for chest pain, Disp: 25 tablet, Rfl: 0  •  potassium chloride (K-DUR,KLOR-CON) 20 mEq tablet, Take 2 tablets (40 mEq total) by mouth daily (Patient not taking: Reported on 11/1/2022), Disp: 60 tablet, Rfl: 8  •  prasugrel (EFFIENT) tablet, TAKE 1 TABLET BY MOUTH EVERY DAY, Disp: 30 tablet, Rfl: 11  •  sertraline (ZOLOFT) 25 mg tablet, TAKE 1 TABLET BY MOUTH EVERY DAY, Disp: 90 tablet, Rfl: 0  •  Xarelto 15 MG tablet, TAKE 1 TABLET (15 MG TOTAL) BY MOUTH DAILY WITH DINNER, Disp: 30 tablet, Rfl: 8  No current facility-administered medications for this visit  Review of Systems   Constitutional: Negative for appetite change, chills, fatigue, fever and unexpected weight change  HENT: Negative for congestion, hearing loss and postnasal drip  Respiratory: Negative for cough and shortness of breath  Cardiovascular: Positive for leg swelling  Skin: Positive for wound (RLE)  Negative for rash  Neurological: Negative for numbness  Hematological: Does not bruise/bleed easily  Psychiatric/Behavioral: Negative            Objective:  /70   Pulse 90   Temp (!) 96 2 °F (35 7 °C)   Resp 18   Pain Score:   2     Physical Exam  Vitals reviewed  Constitutional:       General: She is not in acute distress  Appearance: Normal appearance  She is well-developed  She is obese  HENT:      Head: Normocephalic and atraumatic  Cardiovascular:      Rate and Rhythm: Normal rate  Pulmonary:      Effort: Pulmonary effort is normal    Musculoskeletal:         General: No deformity  Right lower leg: No edema  Left lower leg: No edema  Skin:     General: Skin is warm and dry  Findings: Wound (RLE) present  Comments: Yellow adherent slough on wound bed  See wound assessment  Neurological:      General: No focal deficit present  Mental Status: She is alert and oriented to person, place, and time  Gait: Gait normal    Psychiatric:         Mood and Affect: Mood and affect normal          Behavior: Behavior normal  Behavior is cooperative  Debridement   Universal Protocol:  Consent: Verbal consent obtained  Written consent obtained  Risks and benefits: risks, benefits and alternatives were discussed  Consent given by: patient  Time out: Immediately prior to procedure a "time out" was called to verify the correct patient, procedure, equipment, support staff and site/side marked as required    Patient understanding: patient states understanding of the procedure being performed  Patient identity confirmed: verbally with patient      Performed by: PA  Debridement type: surgical  Level of debridement: subcutaneous tissue  Pain control: lidocaine 4%  Post-debridement measurements  Length (cm): 1 3  Width (cm): 5 5  Depth (cm): 0 4  Percent debrided: 100%  Surface Area (cm^2): 7 15  Area debrided (cm^2): 7 15  Volume (cm^3): 2 86  Tissue and other material debrided: subcutaneous tissue  Devitalized tissue debrided: biofilm, clots, exudate, fibrin and slough  Instrument(s) utilized: curette  Bleeding: small  Hemostasis obtained with: pressure  Procedural pain (0-10): 0  Post-procedural pain: 0   Response to treatment: procedure was tolerated well                Wound Instructions:  Orders Placed This Encounter   Procedures   • Wound cleansing and dressings     Right Leg wound     Wash your hands with soap and water  Remove old dressing, discard into plastic bag and place in trash  Cleanse the wound with normal saline  prior to applying a clean dressing  Do not use tissue or cotton balls  Do not scrub the wound  Pat dry using gauze  Shower no keep wound clean and dry     Apply polymen over the wound  Cover with gauze and Juanita Secure with tape  Change dressing every other day     Follow up with Dr Liane Moon the vascular doctor to eval circulation     Standing Status:   Future     Standing Expiration Date:   11/8/2023   • Debridement     This order was created via procedure documentation       Juliana Nelson PA-C, [facility]      Portions of the record may have been created with voice recognition software  Occasional wrong word or "sound alike" substitutions may have occurred due to the inherent limitations of voice recognition software  Read the chart carefully and recognize, using context, where substitutions have occurred

## 2022-11-15 ENCOUNTER — OFFICE VISIT (OUTPATIENT)
Dept: WOUND CARE | Facility: CLINIC | Age: 82
End: 2022-11-15

## 2022-11-15 VITALS
TEMPERATURE: 96.6 F | HEART RATE: 88 BPM | SYSTOLIC BLOOD PRESSURE: 137 MMHG | RESPIRATION RATE: 18 BRPM | DIASTOLIC BLOOD PRESSURE: 60 MMHG

## 2022-11-15 DIAGNOSIS — S81.801A TRAUMATIC OPEN WOUND OF RIGHT LOWER LEG, INITIAL ENCOUNTER: Primary | ICD-10-CM

## 2022-11-15 RX ORDER — LIDOCAINE 40 MG/G
CREAM TOPICAL ONCE
Status: COMPLETED | OUTPATIENT
Start: 2022-11-15 | End: 2022-11-15

## 2022-11-15 RX ADMIN — LIDOCAINE: 40 CREAM TOPICAL at 13:15

## 2022-11-15 NOTE — PROGRESS NOTES
Patient ID: Carrillo Fountain is a 79 y o  female Date of Birth 1940       Chief Complaint   Patient presents with   • Follow Up Wound Care Visit     Right lower leg wound       Allergies:  Sulfa antibiotics, Formaldehyde, and Codeine    Diagnosis:   Diagnosis ICD-10-CM Associated Orders   1  Traumatic open wound of right lower leg, initial encounter  S81 801A lidocaine (LMX) 4 % cream     Wound cleansing and dressings     Debridement        Assessment and Plan :  · F/u evaluation of open traumatic wound on RLE healing stalled  · Debrided as below  · Change wound management to light packing with Iodoform, see wound orders below   · Do not wet nor submerge in water  · Can cleanse with normal saline at dressing changes  · Continue frequent elevation of leg and increase exercise/walking for wound healing  · Continue adequate protein intake (chicken, fish, yogurt, eggs and nuts), 3-4 servings per day to expedite wound healing    · Pt to continue to f/u with Dr Serge Peralta, vascular surgery   · Followup in  1 week(s) or call sooner with questions or concerns or any signs of infection such as redness, swelling, increased/purulent drainage, fever, chills, increased severe pain  Subjective:   11/1/22  Pt is an 79 y o  Female with pmhx HTN, HLD, CAD s/p CABG x4 (2011, on Effient), Afib (on Xarelto), Tachy-jillian syndrome, PAD, BLE Edema, Depression, Renal artery stenosis, Pulmonary HTN, Sick sinus syndrome, Memory loss, Mild cognitive impairment, b/l Carotid stenosis, Seizure like activity, Cervical spondylosis with cervical radiculopathy who presents for initial eval of RLE open traumatic wound which has been present since 10/3/22 after a fall  Pt was treated in ED, received Tdap vaccine and laceration was repaired with sutures  Pt then followed up with PCP who removed sutures on 10/18/22 and was started on a courses of antibiotics: Keflex on 10/29/22 for laura-wound erythema   Pt has been applying vaseline on the wound bed  Does not have an odor  No diabetes   No smoking, ETOH or drug use  Pt denies any sob, fatigue, N/V, CP, fever or chills  Pt is accompanied by her  who is actively involved in her care  11/8/22:  Patient presents for followup evaluation of RLE open traumatic wound accompanied by her   Has been using Hydrofera Blue on the wound bed securing with Spandifast  Pt denies any fever or chills  11/15/22:  Patient presents for f/u eval of RLE open traumatic wound accompanied by her   Has been using Hydrofera Blue alternating with Polymem on the wound bed securing with Spandifast  Pt was directed to use Polymem but forgets at times and uses Hydrofera blue instead  Pt has a known h/o memory loss  Pt denies any fever or chills        The following portions of the patient's history were reviewed and updated as appropriate:   Patient Active Problem List   Diagnosis   • Essential hypertension   • 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)   • A-fib (HCC)   • 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HCC)   • PAD (peripheral artery disease) (HCC)   • Abnormal liver enzymes   • Edema of left upper extremity   • Urinary retention   • Seizure-like activity ([facility])   • Hyperlipidemia   • Toxic metabolic encephalopathy   • Depression   • Current use of long term anticoagulation   • Long term current use of amiodarone   • Renal artery stenosis (HCC)   • Pulmonary hypertension (HCC)   • Sick sinus syndrome (HCC)   • Hx of CABG   • CAD (coronary artery disease)   • Memory loss   • Mild cognitive impairment   • Atherosclerosis with claudication of extremity (HCC)   • Carotid stenosis, asymptomatic, bilateral   • Acute (reversible) ischemia of small intestine, extent unspecified (HCC)   • Cervical disc disorder with radiculopathy of mid-cervical region   • Cervical spondylosis   • Fall     Past Medical History:   Diagnosis Date   • Anal fissure    • Cardiac disorder    • Cognitive changes 12/23/2020   • Esophageal reflux    • Esophagitis, reflux    • Hemorrhoids    • Hepatic hemangioma     Last Assessed: 1/13/2015   • Herpes zoster    • History of colonic polyps    • Hypertension    • Ischemic colitis ([facility] )    • Lumbar herniated disc    • Malignant neoplasm without specification of site [facility])    • Nephrolithiasis     L  Lithotripsy   • Nontoxic single thyroid nodule     Last Assessed: 1/13/2015   • Osteoarthritis    • Overactive bladder    • Raynaud disease    • Respiratory system disease    • Sjogren's disease ([facility] )    • Spinal stenosis    • PONCHO (stress urinary incontinence, female)    • Uterovaginal prolapse     Grade I-II     Past Surgical History:   Procedure Laterality Date   • APPENDECTOMY  1947   • CARDIAC PACEMAKER PLACEMENT  01/2021   • CARDIAC SURGERY      CABG   • CATARACT EXTRACTION Bilateral    • COLONOSCOPY  2012    Fiberoptic   • COLONOSCOPY      Resolved: 2006 - 2012 5 year f/u   • CORONARY ANGIOPLASTY WITH STENT PLACEMENT     • CORONARY ARTERY BYPASS GRAFT      Resolved: 2012   • ESOPHAGOGASTRODUODENOSCOPY  2012    Diagnostic   • HEMORROIDECTOMY     • KNEE SURGERY     • LITHOTRIPSY      Renal   • MALIGNANT SKIN LESION EXCISION      Face; Resolved: 2004   • LA ESOPHAGOGASTRODUODENOSCOPY TRANSORAL DIAGNOSTIC N/A 4/13/2016    Procedure: EGD AND COLONOSCOPY;  Surgeon: Pablo Kamara MD;  [facility];   Service: Gastroenterology   • RENAL ARTERY STENT     • SKIN LESION EXCISION      Scalp   • SOFT TISSUE TUMOR RESECTION      Shoulder; Resolved: 1995   • THROMBOLYSIS      Postoperative Thrombolysis PTCA   • TONSILLECTOMY      Resolved: 1944     Family History   Problem Relation Age of Onset   • Heart disease Mother    • Hypertension Mother    • Osteoporosis Mother    • Heart disease Father    • Hypertension Father    • No Known Problems Sister    • Heart disease Brother    • Diabetes Brother    • No Known Problems Daughter    • No Known Problems Son    • No Known Problems Maternal Grandmother    • No Known Problems Maternal Grandfather    • No Known Problems Paternal Grandmother    • No Known Problems Paternal Grandfather    • Ulcerative colitis Family    • Colon polyps Family    • Breast cancer Neg Hx    • Colon cancer Neg Hx    • Ovarian cancer Neg Hx      Social History     Socioeconomic History   • Marital status: /Civil Union     Spouse name: None   • Number of children: None   • Years of education: None   • Highest education level: None   Occupational History   • Occupation: retired   Tobacco Use   • Smoking status: Never Smoker   • Smokeless tobacco: Never Used   Vaping Use   • Vaping Use: Never used   Substance and Sexual Activity   • Alcohol use:  Yes     Alcohol/week: 1 0 standard drink     Types: 1 Glasses of wine per week     Comment: occas   • Drug use: Never   • Sexual activity: Not Currently   Other Topics Concern   • None   Social History Narrative    Activities: golf    Caffeine use    Consumes healthy diet    Daily caffeinated coffee consumption: 1 cup per day    Drinks caffeinated tea: 1 cup per day    Well balanced diet     Social Determinants of Health     Financial Resource Strain: Not on file   Food Insecurity: Not on file   Transportation Needs: Not on file   Physical Activity: Not on file   Stress: Not on file   Social Connections: Not on file   Intimate Partner Violence: Not on file   Housing Stability: Not on file       Current Outpatient Medications:   •  acetaminophen (TYLENOL) 325 mg tablet, Take 2 tablets (650 mg total) by mouth every 6 (six) hours as needed for mild pain, Disp: , Rfl: 0  •  amiodarone 200 mg tablet, Take 0 5 tablets (100 mg total) by mouth daily with breakfast, Disp: 45 tablet, Rfl: 3  •  amLODIPine (NORVASC) 5 mg tablet, Take 1 tablet (5 mg total) by mouth daily (Patient not taking: Reported on 11/1/2022), Disp: 90 tablet, Rfl: 3  •  atorvastatin (LIPITOR) 20 mg tablet, TAKE 1 TABLET BY MOUTH EVERY DAY WITH DINNER, Disp: 30 tablet, Rfl: 8  •  bumetanide (BUMEX) 2 mg tablet, TAKE 1 TABLET BY MOUTH EVERY DAY, Disp: 30 tablet, Rfl: 8  •  gabapentin (NEURONTIN) 100 mg capsule, Take 1 tablet at bedtime  May increase to 2 tablets after 3 days (Patient taking differently: 100 mg 2 (two) times a day Take 1 tablet at bedtime  May increase to 2 tablets after 3 days), Disp: 60 capsule, Rfl: 1  •  levETIRAcetam (KEPPRA) 500 mg tablet, Take 1 tablet (500 mg total) by mouth every 12 (twelve) hours, Disp: 60 tablet, Rfl: 3  •  magnesium oxide (MAG-OX) 400 mg, Take 1 tablet (400 mg total) by mouth 2 (two) times a day before lunch and dinner (Patient not taking: Reported on 11/1/2022), Disp: , Rfl: 0  •  methocarbamol (ROBAXIN) 500 mg tablet, Take 1 tablet (500 mg total) by mouth 3 (three) times a day, Disp: 30 tablet, Rfl: 1  •  metoprolol succinate (TOPROL-XL) 25 mg 24 hr tablet, TAKE 1 TABLET BY MOUTH EVERY DAY, Disp: 30 tablet, Rfl: 8  •  multivitamin (THERAGRAN) TABS, Take 1 tablet by mouth daily  , Disp: , Rfl:   •  nitroglycerin (NITROSTAT) 0 4 mg SL tablet, Place 1 tablet (0 4 mg total) under the tongue every 5 (five) minutes as needed for chest pain, Disp: 25 tablet, Rfl: 0  •  potassium chloride (K-DUR,KLOR-CON) 20 mEq tablet, Take 2 tablets (40 mEq total) by mouth daily (Patient not taking: Reported on 11/1/2022), Disp: 60 tablet, Rfl: 8  •  prasugrel (EFFIENT) tablet, TAKE 1 TABLET BY MOUTH EVERY DAY, Disp: 30 tablet, Rfl: 11  •  sertraline (ZOLOFT) 25 mg tablet, TAKE 1 TABLET BY MOUTH EVERY DAY, Disp: 90 tablet, Rfl: 0  •  Xarelto 15 MG tablet, TAKE 1 TABLET (15 MG TOTAL) BY MOUTH DAILY WITH DINNER, Disp: 30 tablet, Rfl: 8  No current facility-administered medications for this visit  Review of Systems   Constitutional: Negative for appetite change, chills, fatigue, fever and unexpected weight change  HENT: Negative for congestion, hearing loss and postnasal drip  Respiratory: Negative for cough and shortness of breath      Cardiovascular: Positive for leg swelling  Skin: Positive for wound (RLE)  Negative for rash  Neurological: Negative for numbness  Hematological: Does not bruise/bleed easily  Psychiatric/Behavioral: Negative  Objective:  /60   Pulse 88   Temp (!) 96 6 °F (35 9 °C)   Resp 18   Pain Score: 0-No pain     Physical Exam  Vitals reviewed  Constitutional:       General: She is not in acute distress  Appearance: Normal appearance  She is well-developed and normal weight  HENT:      Head: Normocephalic and atraumatic  Cardiovascular:      Rate and Rhythm: Normal rate  Pulmonary:      Effort: Pulmonary effort is normal    Musculoskeletal:         General: No deformity  Right lower leg: No edema  Left lower leg: No edema  Skin:     General: Skin is warm and dry  Findings: Wound (RLE) present  Comments: Yellow adherent slough on wound bed with   5cm tunnel at 5 o'clock  See wound assessment  Neurological:      General: No focal deficit present  Mental Status: She is alert and oriented to person, place, and time  Gait: Gait normal    Psychiatric:         Mood and Affect: Mood and affect normal          Behavior: Behavior normal  Behavior is cooperative                 Wound 11/01/22 Traumatic Pretibial Proximal;Right (Active)   Wound Image Images linked 11/15/22 1328   Wound Description Yellow;Pink;Slough 11/15/22 1309   Johnna-wound Assessment Clean;[place] 11/15/22 1309   Wound Length (cm) 1 4 cm 11/15/22 1309   Wound Width (cm) 1 6 cm 11/15/22 1309   Wound Depth (cm) 0 3 cm 11/15/22 1309   Wound Surface Area (cm^2) 2 24 cm^2 11/15/22 1309   Wound Volume (cm^3) 0 672 cm^3 11/15/22 1309   Calculated Wound Volume (cm^3) 0 67 cm^3 11/15/22 1309   Change in Wound Size % 19 28 11/15/22 1309   Tunneling 0 5 cm 11/15/22 1309   Tunneling in depth located at 5 oclock 11/15/22 1309   Drainage Amount Moderate 11/15/22 1309   Drainage Description Serosanguineous 11/15/22 1309   Non-staged Wound Description Full thickness 11/15/22 1309   Dressing Status Intact 11/15/22 1309       Debridement   Wound 11/01/22 Traumatic Pretibial Proximal;Right    Universal Protocol:  Consent: Verbal consent obtained  Written consent obtained  Risks and benefits: risks, benefits and alternatives were discussed  Consent given by: patient  Time out: Immediately prior to procedure a "time out" was called to verify the correct patient, procedure, equipment, support staff and site/side marked as required  Patient understanding: patient states understanding of the procedure being performed  Patient identity confirmed: verbally with patient      Performed by: PA  Debridement type: surgical  Level of debridement: subcutaneous tissue  Pain control: lidocaine 4%  Post-debridement measurements  Length (cm): 1 4  Width (cm): 1 6  Depth (cm): 0 4  Percent debrided: 100%  Surface Area (cm^2): 2 24  Area debrided (cm^2): 2 24  Volume (cm^3): 0 9  Tissue and other material debrided: subcutaneous tissue  Devitalized tissue debrided: biofilm, exudate, fibrin and slough  Instrument(s) utilized: curette  Bleeding: small  Hemostasis obtained with: pressure  Procedural pain (0-10): 0  Post-procedural pain: 0   Response to treatment: procedure was tolerated well                  Wound Instructions:  Orders Placed This Encounter   Procedures   • Wound cleansing and dressings     Wound cleansing and dressings         Right Leg wound     Wash your hands with soap and water  Remove old dressing, discard into plastic bag and place in trash  Cleanse the wound with normal saline  prior to applying a clean dressing  Do not use tissue or cotton balls  Do not scrub the wound  Pat dry using gauze  Shower no keep wound clean and dry     Pack with iodoform packing into the wound     Cover with gauze and Juanita Secure with tape  Change dressing every day     Follow up with Dr Lupillo Mancia the vascular doctor to eval circulation     Standing Status:   Future     Standing Expiration Date:   11/15/2023   • Debridement     This order was created via procedure documentation       Eric Pitt PA-C, [facility]      Portions of the record may have been created with voice recognition software  Occasional wrong word or "sound alike" substitutions may have occurred due to the inherent limitations of voice recognition software  Read the chart carefully and recognize, using context, where substitutions have occurred

## 2022-11-15 NOTE — PATIENT INSTRUCTIONS
Orders Placed This Encounter   Procedures    Wound cleansing and dressings     Wound cleansing and dressings         Right Leg wound     Wash your hands with soap and water  Remove old dressing, discard into plastic bag and place in trash  Cleanse the wound with normal saline  prior to applying a clean dressing  Do not use tissue or cotton balls  Do not scrub the wound  Pat dry using gauze  Shower no keep wound clean and dry     Pack with iodoform packing into the wound     Cover with gauze and Juantia Secure with tape  Change dressing every day     Follow up with Dr Merlin Nice the vascular doctor to eval circulation     Standing Status:   Future     Standing Expiration Date:   11/15/2023

## 2022-11-22 ENCOUNTER — OFFICE VISIT (OUTPATIENT)
Dept: WOUND CARE | Facility: CLINIC | Age: 82
End: 2022-11-22

## 2022-11-22 VITALS
SYSTOLIC BLOOD PRESSURE: 126 MMHG | HEART RATE: 92 BPM | TEMPERATURE: 95.8 F | DIASTOLIC BLOOD PRESSURE: 60 MMHG | RESPIRATION RATE: 18 BRPM

## 2022-11-22 DIAGNOSIS — S81.801A TRAUMATIC OPEN WOUND OF RIGHT LOWER LEG, INITIAL ENCOUNTER: Primary | ICD-10-CM

## 2022-11-22 DIAGNOSIS — I50.40 COMBINED CONGESTIVE SYSTOLIC AND DIASTOLIC HEART FAILURE (HCC): ICD-10-CM

## 2022-11-22 DIAGNOSIS — I73.9 PAD (PERIPHERAL ARTERY DISEASE) (HCC): ICD-10-CM

## 2022-11-22 RX ORDER — LIDOCAINE HYDROCHLORIDE 40 MG/ML
5 SOLUTION TOPICAL ONCE
Status: COMPLETED | OUTPATIENT
Start: 2022-11-22 | End: 2022-11-22

## 2022-11-22 RX ORDER — BUMETANIDE 2 MG/1
TABLET ORAL
Qty: 30 TABLET | Refills: 8 | Status: SHIPPED | OUTPATIENT
Start: 2022-11-22

## 2022-11-22 RX ADMIN — LIDOCAINE HYDROCHLORIDE 5 ML: 40 SOLUTION TOPICAL at 14:38

## 2022-11-22 NOTE — PATIENT INSTRUCTIONS
Orders Placed This Encounter   Procedures    Debridement     This order was created via procedure documentation    Wound cleansing and dressings     Right Leg wound       Wash your hands with soap and water  Remove old dressing, discard into plastic bag and place in trash  Cleanse the wound with normal saline  prior to applying a clean dressing  Do not use tissue or cotton balls  Do not scrub the wound  Pat dry using gauze  Shower no keep wound clean and dry       Pack with Aquacel Ag packing into the wound     Cover with gauze and Juanita Secure with tape   Change dressing every other day       Follow up with Dr Mary Ann Dong the vascular doctor to eval circulation     Standing Status:   Future     Standing Expiration Date:   11/22/2023    Ambulatory referral to Vascular Surgery     Standing Status:   Future     Standing Expiration Date:   11/22/2023     Referral Priority:   Routine     Referral Type:   Consult - AMB     Referral Reason:   Specialty Services Required     Referred to Provider:   Joaquin Hill MD     Requested Specialty:   Vascular Surgery     Number of Visits Requested:   1     Expiration Date:   11/22/2023

## 2022-11-22 NOTE — PROGRESS NOTES
Patient ID: Freddie Huggins is a 79 y o  female Date of Birth 1940       Chief Complaint   Patient presents with   • Follow Up Wound Care Visit     Right leg wound       Allergies:  Sulfa antibiotics, Formaldehyde, and Codeine    Diagnosis:      Diagnosis ICD-10-CM Associated Orders   1  Traumatic open wound of right lower leg, initial encounter  S81 801A lidocaine (XYLOCAINE) 4 % topical solution 5 mL     Debridement     Wound cleansing and dressings      2  PAD (peripheral artery disease) ([facility])  I73 9 Ambulatory referral to Vascular Surgery              Assessment & Plan:  Slight improvement in traumatic wound of the right pretibial area  Surgical debridement  Discontinue packing  Will use Aquacel Ag rope but not forced into the undermined areas  PAD with asymptomatic limb ischemia  To have LEADS repeated in December  Followup with Dr Nash Siemens         Subjective:   11/1/22  Pt is an 79 y o  Female with pmhx HTN, HLD, CAD s/p CABG x4 (2011, on Effient), Afib (on Xarelto), Tachy-jillian syndrome, PAD, BLE Edema, Depression, Renal artery stenosis, Pulmonary HTN, Sick sinus syndrome, Memory loss, Mild cognitive impairment, b/l Carotid stenosis, Seizure like activity, Cervical spondylosis with cervical radiculopathy who presents for initial eval of RLE open traumatic wound which has been present since 10/3/22 after a fall  Pt was treated in ED, received Tdap vaccine and laceration was repaired with sutures  Pt then followed up with PCP who removed sutures on 10/18/22 and was started on a courses of antibiotics: Keflex on 10/29/22 for laura-wound erythema  Pt has been applying vaseline on the wound bed  Does not have an odor  No diabetes   No smoking, ETOH or drug use  Pt denies any sob, fatigue, N/V, CP, fever or chills  Pt is accompanied by her  who is actively involved in her care  11/8/22:  Patient presents for followup evaluation of RLE open traumatic wound accompanied by her    Has been using Hydrofera Blue on the wound bed securing with Spandifast  Pt denies any fever or chills  11/15/22:  Patient presents for f/u eval of RLE open traumatic wound accompanied by her   Has been using Hydrofera Blue alternating with Polymem on the wound bed securing with Spandifast  Pt was directed to use Polymem but forgets at times and uses Hydrofera blue instead  Pt has a known h/o memory loss  Pt denies any fever or chills  11/22/22: Followup traumatic wound of the right lower extremity  Iodoform packing and Polymem has been used  Only has pain when dressings are changed  Patient has a history of significant PAD and is scheduled to have LEADS done before the end of the year and followup with vascular surgery        The following portions of the patient's history were reviewed and updated as appropriate:   Patient Active Problem List   Diagnosis   • Essential hypertension   • 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)   • A-fib (HCC)   • 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HCC)   • PAD (peripheral artery disease) (HCC)   • Abnormal liver enzymes   • Edema of left upper extremity   • Urinary retention   • Seizure-like activity (HCC)   • Hyperlipidemia   • Toxic metabolic encephalopathy   • Depression   • Current use of long term anticoagulation   • Long term current use of amiodarone   • Renal artery stenosis (HCC)   • Pulmonary hypertension (HCC)   • Sick sinus syndrome (HCC)   • Hx of CABG   • CAD (coronary artery disease)   • Memory loss   • Mild cognitive impairment   • Atherosclerosis with claudication of extremity (HCC)   • Carotid stenosis, asymptomatic, bilateral   • Acute (reversible) ischemia of small intestine, extent unspecified (HCC)   • Cervical disc disorder with radiculopathy of mid-cervical region   • Cervical spondylosis   • Fall     Past Medical History:   Diagnosis Date   • Anal fissure    • Cardiac disorder    • Cognitive changes 12/23/2020   • Esophageal reflux    • Esophagitis, reflux    • Hemorrhoids    • Hepatic hemangioma     Last Assessed: 1/13/2015   • Herpes zoster    • History of colonic polyps    • Hypertension    • Ischemic colitis ([facility] )    • Lumbar herniated disc    • Malignant neoplasm without specification of site [facility])    • Nephrolithiasis     L  Lithotripsy   • Nontoxic single thyroid nodule     Last Assessed: 1/13/2015   • Osteoarthritis    • Overactive bladder    • Raynaud disease    • Respiratory system disease    • Sjogren's disease ([facility] )    • Spinal stenosis    • PONCHO (stress urinary incontinence, female)    • Uterovaginal prolapse     Grade I-II     Past Surgical History:   Procedure Laterality Date   • APPENDECTOMY  1947   • CARDIAC PACEMAKER PLACEMENT  01/2021   • CARDIAC SURGERY      CABG   • CATARACT EXTRACTION Bilateral    • COLONOSCOPY  2012    Fiberoptic   • COLONOSCOPY      Resolved: 2006 - 2012 5 year f/u   • CORONARY ANGIOPLASTY WITH STENT PLACEMENT     • CORONARY ARTERY BYPASS GRAFT      Resolved: 2012   • ESOPHAGOGASTRODUODENOSCOPY  2012    Diagnostic   • HEMORROIDECTOMY     • KNEE SURGERY     • LITHOTRIPSY      Renal   • MALIGNANT SKIN LESION EXCISION      Face; Resolved: 2004   • TX ESOPHAGOGASTRODUODENOSCOPY TRANSORAL DIAGNOSTIC N/A 4/13/2016    Procedure: EGD AND COLONOSCOPY;  Surgeon: Jhonatan Yanes MD;  Location: [facility];   Service: Gastroenterology   • RENAL ARTERY STENT     • SKIN LESION EXCISION      Scalp   • SOFT TISSUE TUMOR RESECTION      Shoulder; Resolved: 1995   • THROMBOLYSIS      Postoperative Thrombolysis PTCA   • TONSILLECTOMY      Resolved: 1944     Family History   Problem Relation Age of Onset   • Heart disease Mother    • Hypertension Mother    • Osteoporosis Mother    • Heart disease Father    • Hypertension Father    • No Known Problems Sister    • Heart disease Brother    • Diabetes Brother    • No Known Problems Daughter    • No Known Problems Son    • No Known Problems Maternal Grandmother    • No Known Problems Maternal Grandfather    • No Known Problems Paternal Grandmother    • No Known Problems Paternal Grandfather    • Ulcerative colitis Family    • Colon polyps Family    • Breast cancer Neg Hx    • Colon cancer Neg Hx    • Ovarian cancer Neg Hx       Social History     Socioeconomic History   • Marital status: /Civil Union     Spouse name: None   • Number of children: None   • Years of education: None   • Highest education level: None   Occupational History   • Occupation: retired   Tobacco Use   • Smoking status: Never   • Smokeless tobacco: Never   Vaping Use   • Vaping Use: Never used   Substance and Sexual Activity   • Alcohol use:  Yes     Alcohol/week: 1 0 standard drink     Types: 1 Glasses of wine per week     Comment: occas   • Drug use: Never   • Sexual activity: Not Currently   Other Topics Concern   • None   Social History Narrative    Activities: golf    Caffeine use    Consumes healthy diet    Daily caffeinated coffee consumption: 1 cup per day    Drinks caffeinated tea: 1 cup per day    Well balanced diet     Social Determinants of Health     Financial Resource Strain: Not on file   Food Insecurity: Not on file   Transportation Needs: Not on file   Physical Activity: Not on file   Stress: Not on file   Social Connections: Not on file   Intimate Partner Violence: Not on file   Housing Stability: Not on file        Current Outpatient Medications:   •  acetaminophen (TYLENOL) 325 mg tablet, Take 2 tablets (650 mg total) by mouth every 6 (six) hours as needed for mild pain, Disp: , Rfl: 0  •  amiodarone 200 mg tablet, Take 0 5 tablets (100 mg total) by mouth daily with breakfast, Disp: 45 tablet, Rfl: 3  •  amLODIPine (NORVASC) 5 mg tablet, Take 1 tablet (5 mg total) by mouth daily (Patient not taking: Reported on 11/1/2022), Disp: 90 tablet, Rfl: 3  •  atorvastatin (LIPITOR) 20 mg tablet, TAKE 1 TABLET BY MOUTH EVERY DAY WITH DINNER, Disp: 30 tablet, Rfl: 8  •  bumetanide (BUMEX) 2 mg tablet, TAKE 1 TABLET BY MOUTH EVERY DAY, Disp: 30 tablet, Rfl: 8  •  gabapentin (NEURONTIN) 100 mg capsule, Take 1 tablet at bedtime  May increase to 2 tablets after 3 days (Patient taking differently: 100 mg 2 (two) times a day Take 1 tablet at bedtime  May increase to 2 tablets after 3 days), Disp: 60 capsule, Rfl: 1  •  levETIRAcetam (KEPPRA) 500 mg tablet, Take 1 tablet (500 mg total) by mouth every 12 (twelve) hours, Disp: 60 tablet, Rfl: 3  •  magnesium oxide (MAG-OX) 400 mg, Take 1 tablet (400 mg total) by mouth 2 (two) times a day before lunch and dinner (Patient not taking: Reported on 11/1/2022), Disp: , Rfl: 0  •  methocarbamol (ROBAXIN) 500 mg tablet, Take 1 tablet (500 mg total) by mouth 3 (three) times a day, Disp: 30 tablet, Rfl: 1  •  metoprolol succinate (TOPROL-XL) 25 mg 24 hr tablet, TAKE 1 TABLET BY MOUTH EVERY DAY, Disp: 30 tablet, Rfl: 8  •  multivitamin (THERAGRAN) TABS, Take 1 tablet by mouth daily  , Disp: , Rfl:   •  nitroglycerin (NITROSTAT) 0 4 mg SL tablet, Place 1 tablet (0 4 mg total) under the tongue every 5 (five) minutes as needed for chest pain, Disp: 25 tablet, Rfl: 0  •  potassium chloride (K-DUR,KLOR-CON) 20 mEq tablet, Take 2 tablets (40 mEq total) by mouth daily (Patient not taking: Reported on 11/1/2022), Disp: 60 tablet, Rfl: 8  •  prasugrel (EFFIENT) tablet, TAKE 1 TABLET BY MOUTH EVERY DAY, Disp: 30 tablet, Rfl: 11  •  sertraline (ZOLOFT) 25 mg tablet, TAKE 1 TABLET BY MOUTH EVERY DAY, Disp: 90 tablet, Rfl: 0  •  Xarelto 15 MG tablet, TAKE 1 TABLET (15 MG TOTAL) BY MOUTH DAILY WITH DINNER, Disp: 30 tablet, Rfl: 8  No current facility-administered medications for this visit  Review of Systems   Constitutional: Negative for appetite change, chills, fatigue, fever and unexpected weight change  HENT: Negative for congestion, hearing loss and postnasal drip  Respiratory: Negative for cough and shortness of breath  Cardiovascular: Positive for leg swelling     Skin: Positive for wound (RLE)  Negative for rash  Neurological: Negative for numbness  Hematological: Does not bruise/bleed easily  Psychiatric/Behavioral: Negative  Objective:  /60   Pulse 92   Temp (!) 95 8 °F (35 4 °C)   Resp 18   Pain Score: 0-No pain     Physical Exam  Vitals reviewed  Constitutional:       General: She is not in acute distress  Appearance: Normal appearance  She is well-developed and normal weight  HENT:      Head: Normocephalic and atraumatic  Cardiovascular:      Rate and Rhythm: Normal rate  Pulmonary:      Effort: Pulmonary effort is normal    Musculoskeletal:         General: No deformity  Right lower leg: No edema  Left lower leg: No edema  Skin:     General: Skin is warm and dry  Findings: Wound (RLE) present  No erythema  Comments: Mixture of granulation tissue, fibrin and some slough  Overall measurements are slightly improved  No surrounding erythema or malodor  No signs of infection  There is still some undermining and tunneling, but appears to be less  Neurological:      General: No focal deficit present  Mental Status: She is alert and oriented to person, place, and time  Gait: Gait normal    Psychiatric:         Mood and Affect: Mood and affect normal          Behavior: Behavior normal  Behavior is cooperative                Wound 11/01/22 Traumatic Pretibial Proximal;Right (Active)   Wound Image Images linked 11/22/22 1455   Wound Description Yellow;Pink;Slough 11/22/22 1432   Johnna-wound Assessment Clean;Pink 11/22/22 1432   Wound Length (cm) 1 2 cm 11/22/22 1432   Wound Width (cm) 1 4 cm 11/22/22 1432   Wound Depth (cm) 0 3 cm 11/22/22 1432   Wound Surface Area (cm^2) 1 68 cm^2 11/22/22 1432   Wound Volume (cm^3) 0 504 cm^3 11/22/22 1432   Calculated Wound Volume (cm^3) 0 5 cm^3 11/22/22 1432   Change in Wound Size % 39 76 11/22/22 1432   Undermining 0 5 11/22/22 1432   Undermining is depth extending from 4 oclock-9 oclock 11/22/22 1432   Drainage Amount Moderate 11/22/22 1432   Drainage Description Serosanguineous; Tran 11/22/22 1432   Non-staged Wound Description Full thickness 11/22/22 1432   Dressing Status Intact 11/22/22 1432       Debridement   Wound 11/01/22 Traumatic Pretibial Proximal;Right    Universal Protocol:  Consent: Verbal consent obtained  Written consent obtained  Consent given by: patient  Time out: Immediately prior to procedure a "time out" was called to verify the correct patient, procedure, equipment, support staff and site/side marked as required  Patient understanding: patient states understanding of the procedure being performed  Patient identity confirmed: verbally with patient      Performed by: physician  Debridement type: surgical  Level of debridement: subcutaneous tissue  Pain control: lidocaine 4%  Post-debridement measurements  Length (cm): 1 2  Width (cm): 1 4  Depth (cm): 0 4  Percent debrided: 100%  Surface Area (cm^2): 1 68  Area debrided (cm^2): 1 68  Volume (cm^3): 0 67  Tissue and other material debrided: dermis and subcutaneous tissue  Devitalized tissue debrided: fibrin and necrotic debris  Instrument(s) utilized: curette  Bleeding: medium  Hemostasis obtained with: pressure  Procedural pain (0-10): 0  Post-procedural pain: 0   Response to treatment: procedure was tolerated well                      Wound Instructions:  Orders Placed This Encounter   Procedures   • Debridement     This order was created via procedure documentation   • Wound cleansing and dressings     Right Leg wound       Wash your hands with soap and water  Diannia Bares old dressing, discard into plastic bag and place in trash   Cleanse the wound with normal saline  prior to applying a clean dressing    Do not use tissue or cotton balls  Do not scrub the wound  Pat dry using gauze  Shower no keep wound clean and dry       Pack with Aquacel Ag packing into the wound     Cover with gauze and Juanita Secure with tape Change dressing every other day       Follow up with Dr Arvind Varela the vascular doctor to eval circulation     Standing Status:   Future     Standing Expiration Date:   11/22/2023   • Ambulatory referral to Vascular Surgery     Standing Status:   Future     Standing Expiration Date:   11/22/2023     Referral Priority:   Routine     Referral Type:   Consult - AMB     Referral Reason:   Specialty Services Required     Referred to Provider:   Kaylee Sanchez MD     Requested Specialty:   Vascular Surgery     Number of Visits Requested:   1     Expiration Date:   11/22/2023             Serg Hart MD, MD, CHT, CWS    Portions of the record may have been created with voice recognition software  Occasional wrong word or "sound alike" substitutions may have occurred due to the inherent limitations of voice recognition software  Read the chart carefully and recognize, using context, where substitutions have occurred

## 2022-11-30 ENCOUNTER — TELEPHONE (OUTPATIENT)
Dept: FAMILY MEDICINE CLINIC | Facility: MEDICAL CENTER | Age: 82
End: 2022-11-30

## 2022-11-30 ENCOUNTER — OFFICE VISIT (OUTPATIENT)
Dept: FAMILY MEDICINE CLINIC | Facility: MEDICAL CENTER | Age: 82
End: 2022-11-30

## 2022-11-30 ENCOUNTER — APPOINTMENT (OUTPATIENT)
Dept: RADIOLOGY | Facility: MEDICAL CENTER | Age: 82
End: 2022-11-30

## 2022-11-30 VITALS
WEIGHT: 141 LBS | BODY MASS INDEX: 28.97 KG/M2 | TEMPERATURE: 97.6 F | RESPIRATION RATE: 16 BRPM | HEART RATE: 88 BPM | DIASTOLIC BLOOD PRESSURE: 88 MMHG | SYSTOLIC BLOOD PRESSURE: 124 MMHG | OXYGEN SATURATION: 97 %

## 2022-11-30 DIAGNOSIS — S29.9XXA RIB INJURY: Primary | ICD-10-CM

## 2022-11-30 DIAGNOSIS — R07.81 RIB PAIN ON LEFT SIDE: ICD-10-CM

## 2022-11-30 RX ORDER — LIDOCAINE 50 MG/G
1 PATCH TOPICAL DAILY
Qty: 15 PATCH | Refills: 0 | Status: SHIPPED | OUTPATIENT
Start: 2022-11-30 | End: 2022-12-07 | Stop reason: SDUPTHER

## 2022-11-30 NOTE — PROGRESS NOTES
Assessment/Plan:    Left rib/chest x-ray ordered  Advised patient to take Tylenol and lidocaine patch as needed for pain relief  Advised patient to proceed to the emergency department if she develops chest pain, shortness a breath  1  Rib injury  Advised patient she may use Tylenol and lidocaine patch as needed for pain relief  Discussed splinting of the area with deep inspiration or cough  Left rib and chest xray ordered  Will call with results and further instruction  - lidocaine (Lidoderm) 5 %; Apply 1 patch topically daily Remove & Discard patch within 12 hours or as directed by MD  Dispense: 15 patch; Refill: 0    2  Rib pain on left side  - XR ribs left w pa chest min 3 views; Future      Subjective:      Patient ID: Marvin Delgado is a 79 y o  female  79year old female presents with complaint of left rib pain  She was getting up from the toilet and lost her balance and fell between the toilet and the bathtub landing on her left side  Fall occurred 2 hours ago  Her pain is exacerbated with deep inspiration  She took 1 tylenol extra strength pta  Denies cp, sob  The following portions of the patient's history were reviewed and updated as appropriate: allergies, current medications, past medical history, past social history, past surgical history and problem list     Review of Systems   Constitutional: Negative  HENT: Negative  Eyes: Negative  Respiratory: Negative  Cardiovascular: Negative  Gastrointestinal: Negative  Endocrine: Negative  Genitourinary: Negative  Musculoskeletal:        Left rib pain   Skin: Negative  Allergic/Immunologic: Negative  Neurological: Negative  Hematological: Negative  Psychiatric/Behavioral: Negative            Objective:      /88 (BP Location: Left arm, Patient Position: Sitting, Cuff Size: Adult)   Pulse 88   Temp 97 6 °F (36 4 °C)   Resp 16   Wt 64 kg (141 lb)   SpO2 97%   BMI 28 97 kg/m²          Physical Exam  Vitals and nursing note reviewed  Constitutional:       General: She is not in acute distress  Appearance: Normal appearance  She is not ill-appearing  HENT:      Head: Normocephalic and atraumatic  Cardiovascular:      Rate and Rhythm: Normal rate and regular rhythm  Pulses: Normal pulses  Pulmonary:      Effort: Pulmonary effort is normal  No respiratory distress  Breath sounds: Normal breath sounds  No stridor  No wheezing, rhonchi or rales  Abdominal:      Palpations: Abdomen is soft  Tenderness: There is no abdominal tenderness  Musculoskeletal:         General: Tenderness and signs of injury present  Arms:       Cervical back: Normal range of motion and neck supple  Skin:     General: Skin is warm and dry  Neurological:      Mental Status: She is alert and oriented to person, place, and time  Mental status is at baseline  Gait: Gait normal    Psychiatric:         Mood and Affect: Mood normal          Behavior: Behavior normal          Thought Content:  Thought content normal                     Mcintosh Imam

## 2022-11-30 NOTE — TELEPHONE ENCOUNTER
Ramila Marcos from [facility] called to make Filemon Savers aware there are significant findings on pt's xray of ribs/chest     Routed to [place]

## 2022-11-30 NOTE — PROGRESS NOTES
Assessment/Plan:    No problem-specific Assessment & Plan notes found for this encounter  {Ambulatory Visit Diagnosis:72981}      Subjective:      Patient ID: León Ramos is a 79 y o  female  79year old female presents with complaint of left rib pain  She was getting up from the toilet and lost her balance and fell between the toilet and the bathtub landing on her left side  Her pain is exacerbated with deep inspiration  She took 1 tylenol extra strength pta  The following portions of the patient's history were reviewed and updated as appropriate: {history reviewed:20406::"allergies","current medications","past family history","past medical history","past social history","past surgical history","problem list"}      Review of Systems      Objective:      /88 (BP Location: Left arm, Patient Position: Sitting, Cuff Size: Adult)   Pulse 88   Temp 97 6 °F (36 4 °C)   Resp 16   Wt 64 kg (141 lb)   SpO2 97%   BMI 28 97 kg/m²          Physical Exam  Musculoskeletal:        Arms:                     Geradine Medico, CRNP

## 2022-11-30 NOTE — TELEPHONE ENCOUNTER
Pt said that pt slipped and fell this morning and is now having sharp pains on left side rib cage  He is concerned that she may have broken a rib  He is hoping to get an order for an x-ray  Please, advise

## 2022-11-30 NOTE — TELEPHONE ENCOUNTER
Please inform patient rib/chest x-ray is negative for rib fractures  For this she can continue use of Tylenol as needed and lidocaine patch as needed  The x-ray did show a nodule on her right lung, it was advised she have a follow up CT in 6 months  I will send result to Dr Tamara Miranda and allow her to decide if chest CT is needed and have her order it if needed

## 2022-11-30 NOTE — TELEPHONE ENCOUNTER
Called pt, and informed  She would like to order mailed to her home so she has a reminder to have it completed

## 2022-11-30 NOTE — PATIENT INSTRUCTIONS
Please have x-ray of your ribs done now  That will confirm whether or not you have any rib fractures  You may take Tylenol as needed for the pain as well as using lidocaine patch as prescribed for pain  We will call you with x-ray results as soon as we have them and further instructions  If you develop shortness of breath, chest pain please proceed to the ER for further evaluation

## 2022-11-30 NOTE — TELEPHONE ENCOUNTER
Triaged- suggest appt to be assessed , treatment and imaging if needed   appt at 11 am with Audrey Foote

## 2022-12-02 DIAGNOSIS — S29.9XXA RIB INJURY: Primary | ICD-10-CM

## 2022-12-02 DIAGNOSIS — S20.212A CONTUSION OF RIB ON LEFT SIDE, INITIAL ENCOUNTER: ICD-10-CM

## 2022-12-02 RX ORDER — OXYCODONE HYDROCHLORIDE 5 MG/1
5 CAPSULE ORAL EVERY 4 HOURS PRN
Qty: 30 CAPSULE | Refills: 0 | Status: SHIPPED | OUTPATIENT
Start: 2022-12-02 | End: 2022-12-07

## 2022-12-02 NOTE — TELEPHONE ENCOUNTER
Please also inform of precautions to take with medication as it may cause dizziness, constipation, nausea

## 2022-12-02 NOTE — TELEPHONE ENCOUNTER
called back to say that the pt is getting no relief from the Lidocaine patches and she is in severe pain and cannot move without extreme pain   wonders if anything was missed  Please, triage

## 2022-12-02 NOTE — TELEPHONE ENCOUNTER
Please inform patient I will send script in for Oxycodone which she can take as needed for moderate to severe pain  This will be for short term use only over the next week  If her pain is mild she can take tylenol  Continue use of Lidocaine patch  The x-ray did not show any fractured ribs  Ensure her the pain should improve however rib contusions take time to heal, weeks sometimes even months

## 2022-12-07 DIAGNOSIS — S29.9XXA RIB INJURY: ICD-10-CM

## 2022-12-07 RX ORDER — LIDOCAINE 50 MG/G
1 PATCH TOPICAL DAILY
Qty: 15 PATCH | Refills: 0 | Status: SHIPPED | OUTPATIENT
Start: 2022-12-07

## 2022-12-07 NOTE — TELEPHONE ENCOUNTER
called, he said he just put the last 2 patches on the pt, and wants to know if he can get a refill sent to [facility] [place]?

## 2022-12-09 ENCOUNTER — OFFICE VISIT (OUTPATIENT)
Dept: WOUND CARE | Facility: CLINIC | Age: 82
End: 2022-12-09

## 2022-12-09 VITALS
HEART RATE: 78 BPM | TEMPERATURE: 96.9 F | RESPIRATION RATE: 18 BRPM | SYSTOLIC BLOOD PRESSURE: 153 MMHG | DIASTOLIC BLOOD PRESSURE: 68 MMHG

## 2022-12-09 DIAGNOSIS — I73.9 PAD (PERIPHERAL ARTERY DISEASE) (HCC): ICD-10-CM

## 2022-12-09 DIAGNOSIS — S81.801A TRAUMATIC OPEN WOUND OF RIGHT LOWER LEG, INITIAL ENCOUNTER: Primary | ICD-10-CM

## 2022-12-09 RX ORDER — LIDOCAINE 40 MG/G
CREAM TOPICAL ONCE
Status: COMPLETED | OUTPATIENT
Start: 2022-12-09 | End: 2022-12-09

## 2022-12-09 RX ADMIN — LIDOCAINE: 40 CREAM TOPICAL at 13:09

## 2022-12-09 NOTE — PROGRESS NOTES
Patient ID: Nancy Gordon is a 79 y o  female Date of Birth 1940       Chief Complaint   Patient presents with   • Follow Up Wound Care Visit     Open wound right lower leg       Allergies:  Sulfa antibiotics, Formaldehyde, and Codeine    Diagnosis:   Diagnosis ICD-10-CM Associated Orders   1  Traumatic open wound of right lower leg, initial encounter  S81 [id number] lidocaine (LMX) 4 % cream     Wound cleansing and dressings      2  PAD (peripheral artery disease) ([facility])  I73 9            Assessment and Plan :  • F/u evaluation of open traumatic wound on RLE progressively healing  • Debrided as below  • Continue wound management with light packing with Aquacel Ag ribbon, see wound orders below   • Do not wet nor submerge in water  • Can cleanse with normal saline at dressing changes  • Continue frequent elevation of leg and increase exercise/walking for wound healing  • Continue adequate protein intake (chicken, fish, yogurt, eggs and nuts), 3-4 servings per day to expedite wound healing    • Pt to continue to f/u with Dr Sofia Sung, vascular surgeon  • Followup in  1 week(s) or call sooner with questions or concerns or any signs of infection such as redness, swelling, increased/purulent drainage, fever, chills, increased severe pain  Subjective:   11/1/22  Pt is an 79 y o  Female with pmhx HTN, HLD, CAD s/p CABG x4 (2011, on Effient), Afib (on Xarelto), Tachy-jillian syndrome, PAD, BLE Edema, Depression, Renal artery stenosis, Pulmonary HTN, Sick sinus syndrome, Memory loss, Mild cognitive impairment, b/l Carotid stenosis, Seizure like activity, Cervical spondylosis with cervical radiculopathy who presents for initial eval of RLE open traumatic wound which has been present since 10/3/22 after a fall  Pt was treated in ED, received Tdap vaccine and laceration was repaired with sutures   Pt then followed up with PCP who removed sutures on 10/18/22 and was started on a courses of antibiotics: Keflex on 10/29/22 for laura-wound erythema  Pt has been applying vaseline on the wound bed  Does not have an odor  No diabetes   No smoking, ETOH or drug use  Pt denies any sob, fatigue, N/V, CP, fever or chills  Pt is accompanied by her  who is actively involved in her care  11/8/22:  Patient presents for followup evaluation of RLE open traumatic wound accompanied by her   Has been using Hydrofera Blue on the wound bed securing with Spandifast  Pt denies any fever or chills  11/15/22:  Patient presents for f/u eval of RLE open traumatic wound accompanied by her   Has been using Hydrofera Blue alternating with Polymem on the wound bed securing with Spandifast  Pt was directed to use Polymem but forgets at times and uses Hydrofera blue instead  Pt has a known h/o memory loss  Pt denies any fever or chills  11/22/22: Followup traumatic wound of the right lower extremity  Iodoform packing and Polymem has been used  Only has pain when dressings are changed  Patient has a history of significant PAD and is scheduled to have LEADS done before the end of the year and followup with vascular surgery  12/9/22: F/u traumatic wound evaluation of the RLE  Has been packing with Aquacel Ag  No complaints          The following portions of the patient's history were reviewed and updated as appropriate:   Patient Active Problem List   Diagnosis   • Essential hypertension   • 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)   • A-fib (HCC)   • 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HCC)   • PAD (peripheral artery disease) (HCC)   • Abnormal liver enzymes   • Edema of left upper extremity   • Urinary retention   • Seizure-like activity (HCC)   • Hyperlipidemia   • Toxic metabolic encephalopathy   • Depression   • Current use of long term anticoagulation   • Long term current use of amiodarone   • Renal artery stenosis (HCC)   • Pulmonary hypertension (HCC)   • Sick sinus syndrome (HCC)   • Hx of CABG   • CAD (coronary artery disease)   • Memory loss   • Mild cognitive impairment   • Atherosclerosis with claudication of extremity (HCC)   • Carotid stenosis, asymptomatic, bilateral   • Acute (reversible) ischemia of small intestine, extent unspecified (HCC)   • Cervical disc disorder with radiculopathy of mid-cervical region   • Cervical spondylosis   • Fall     Past Medical History:   Diagnosis Date   • Anal fissure    • Cardiac disorder    • Cognitive changes 12/23/2020   • Esophageal reflux    • Esophagitis, reflux    • Hemorrhoids    • Hepatic hemangioma     Last Assessed: 1/13/2015   • Herpes zoster    • History of colonic polyps    • Hypertension    • [facility]    • Lumbar herniated disc    • Malignant neoplasm without specification of site [facility])    • Nephrolithiasis     L  Lithotripsy   • Nontoxic single thyroid nodule     Last Assessed: 1/13/2015   • Osteoarthritis    • Overactive bladder    • Raynaud disease    • Respiratory system disease    • Sjogren's disease ([facility] )    • Spinal stenosis    • PONCHO (stress urinary incontinence, female)    • Uterovaginal prolapse     Grade I-II     Past Surgical History:   Procedure Laterality Date   • APPENDECTOMY  1947   • CARDIAC PACEMAKER PLACEMENT  01/2021   • CARDIAC SURGERY      CABG   • CATARACT EXTRACTION Bilateral    • COLONOSCOPY  2012    Fiberoptic   • COLONOSCOPY      Resolved: 2006 - 2012 5 year f/u   • CORONARY ANGIOPLASTY WITH STENT PLACEMENT     • CORONARY ARTERY BYPASS GRAFT      Resolved: 2012   • ESOPHAGOGASTRODUODENOSCOPY  2012    Diagnostic   • HEMORROIDECTOMY     • KNEE SURGERY     • LITHOTRIPSY      Renal   • MALIGNANT SKIN LESION EXCISION      Face; Resolved: 2004   • SD ESOPHAGOGASTRODUODENOSCOPY TRANSORAL DIAGNOSTIC N/A 4/13/2016    Procedure: EGD AND COLONOSCOPY;  Surgeon: Elvia Fournier MD;  Location: [facility];   Service: Gastroenterology   • RENAL ARTERY STENT     • SKIN LESION EXCISION      Scalp   • SOFT TISSUE TUMOR RESECTION      Shoulder; Resolved: 1995   • THROMBOLYSIS      Postoperative Thrombolysis PTCA   • TONSILLECTOMY      Resolved: 1944     Family History   Problem Relation Age of Onset   • Heart disease Mother    • Hypertension Mother    • Osteoporosis Mother    • Heart disease Father    • Hypertension Father    • No Known Problems Sister    • Heart disease Brother    • Diabetes Brother    • No Known Problems Daughter    • No Known Problems Son    • No Known Problems Maternal Grandmother    • No Known Problems Maternal Grandfather    • No Known Problems Paternal Grandmother    • No Known Problems Paternal Grandfather    • Ulcerative colitis Family    • Colon polyps Family    • Breast cancer Neg Hx    • Colon cancer Neg Hx    • Ovarian cancer Neg Hx      Social History     Socioeconomic History   • Marital status: /Civil Union     Spouse name: None   • Number of children: None   • Years of education: None   • Highest education level: None   Occupational History   • Occupation: retired   Tobacco Use   • Smoking status: Never   • Smokeless tobacco: Never   Vaping Use   • Vaping Use: Never used   Substance and Sexual Activity   • Alcohol use:  Yes     Alcohol/week: 1 0 standard drink     Types: 1 Glasses of wine per week     Comment: occas   • Drug use: Never   • Sexual activity: Not Currently   Other Topics Concern   • None   Social History Narrative    Activities: golf    Caffeine use    Consumes healthy diet    Daily caffeinated coffee consumption: 1 cup per day    Drinks caffeinated tea: 1 cup per day    Well balanced diet     Social Determinants of Health     Financial Resource Strain: Not on file   Food Insecurity: Not on file   Transportation Needs: Not on file   Physical Activity: Not on file   Stress: Not on file   Social Connections: Not on file   Intimate Partner Violence: Not on file   Housing Stability: Not on file       Current Outpatient Medications:   •  acetaminophen (TYLENOL) 325 mg tablet, Take 2 tablets (650 mg total) by mouth every 6 (six) hours as needed for mild pain, Disp: , Rfl: 0  •  amiodarone 200 mg tablet, Take 0 5 tablets (100 mg total) by mouth daily with breakfast, Disp: 45 tablet, Rfl: 3  •  amLODIPine (NORVASC) 5 mg tablet, Take 1 tablet (5 mg total) by mouth daily (Patient not taking: Reported on 11/1/2022), Disp: 90 tablet, Rfl: 3  •  atorvastatin (LIPITOR) 20 mg tablet, TAKE 1 TABLET BY MOUTH EVERY DAY WITH DINNER, Disp: 30 tablet, Rfl: 8  •  bumetanide (BUMEX) 2 mg tablet, TAKE 1 TABLET BY MOUTH EVERY DAY, Disp: 30 tablet, Rfl: 8  •  gabapentin (NEURONTIN) 100 mg capsule, Take 1 tablet at bedtime  May increase to 2 tablets after 3 days (Patient taking differently: 100 mg 2 (two) times a day Take 1 tablet at bedtime  May increase to 2 tablets after 3 days), Disp: 60 capsule, Rfl: 1  •  levETIRAcetam (KEPPRA) 500 mg tablet, Take 1 tablet (500 mg total) by mouth every 12 (twelve) hours, Disp: 60 tablet, Rfl: 3  •  lidocaine (Lidoderm) 5 %, Apply 1 patch topically daily Remove & Discard patch within 12 hours or as directed by MD, Disp: 15 patch, Rfl: 0  •  magnesium oxide (MAG-OX) 400 mg, Take 1 tablet (400 mg total) by mouth 2 (two) times a day before lunch and dinner (Patient not taking: Reported on 11/1/2022), Disp: , Rfl: 0  •  methocarbamol (ROBAXIN) 500 mg tablet, Take 1 tablet (500 mg total) by mouth 3 (three) times a day (Patient not taking: Reported on 11/30/2022), Disp: 30 tablet, Rfl: 1  •  metoprolol succinate (TOPROL-XL) 25 mg 24 hr tablet, TAKE 1 TABLET BY MOUTH EVERY DAY, Disp: 30 tablet, Rfl: 8  •  multivitamin (THERAGRAN) TABS, Take 1 tablet by mouth daily  , Disp: , Rfl:   •  nitroglycerin (NITROSTAT) 0 4 mg SL tablet, Place 1 tablet (0 4 mg total) under the tongue every 5 (five) minutes as needed for chest pain, Disp: 25 tablet, Rfl: 0  •  potassium chloride (K-DUR,KLOR-CON) 20 mEq tablet, Take 2 tablets (40 mEq total) by mouth daily (Patient not taking: Reported on 11/1/2022), Disp: 60 tablet, Rfl: 8  • prasugrel (EFFIENT) tablet, TAKE 1 TABLET BY MOUTH EVERY DAY, Disp: 30 tablet, Rfl: 11  •  sertraline (ZOLOFT) 25 mg tablet, TAKE 1 TABLET BY MOUTH EVERY DAY, Disp: 90 tablet, Rfl: 0  •  Xarelto 15 MG tablet, TAKE 1 TABLET (15 MG TOTAL) BY MOUTH DAILY WITH DINNER, Disp: 30 tablet, Rfl: 8  No current facility-administered medications for this visit  Review of Systems   Constitutional: Negative for appetite change, chills, fatigue, fever and unexpected weight change  HENT: Negative for congestion, hearing loss and postnasal drip  Respiratory: Negative for cough and shortness of breath  Cardiovascular: Positive for leg swelling  Skin: Positive for wound (RLE)  Negative for rash  Neurological: Negative for numbness  Hematological: Does not bruise/bleed easily  Psychiatric/Behavioral: Negative  Objective:  /68   Pulse 78   Temp (!) 96 9 °F (36 1 °C)   Resp 18   Pain Score:   2     Physical Exam  Vitals reviewed  Constitutional:       General: She is not in acute distress  Appearance: Normal appearance  She is well-developed and normal weight  HENT:      Head: Normocephalic and atraumatic  Cardiovascular:      Rate and Rhythm: Normal rate  Pulmonary:      Effort: Pulmonary effort is normal    Musculoskeletal:         General: No deformity  Right lower leg: No edema  Left lower leg: No edema  Skin:     General: Skin is warm and dry  Findings: Wound (RLE) present  No erythema  Comments: Some biofilm and slough on pink granulated tissue  Decreased in size  See wound assessment  Neurological:      General: No focal deficit present  Mental Status: She is alert and oriented to person, place, and time  Gait: Gait normal    Psychiatric:         Mood and Affect: Mood and affect normal          Behavior: Behavior normal  Behavior is cooperative               Wound 11/01/22 Traumatic Pretibial Proximal;Right (Active)   Wound Image Images linked 12/09/22 1306   Wound Description Yellow;Bracey 12/09/22 1307   Wound Length (cm) 0 7 cm 12/09/22 1307   Wound Width (cm) 0 6 cm 12/09/22 1307   Wound Depth (cm) 0 3 cm 12/09/22 1307   Wound Surface Area (cm^2) 0 42 cm^2 12/09/22 1307   Wound Volume (cm^3) 0 126 cm^3 12/09/22 1307   Calculated Wound Volume (cm^3) 0 13 cm^3 12/09/22 1307   Change in Wound Size % 84 34 12/09/22 1307   Drainage Amount Moderate 12/09/22 1307   Drainage Description Serosanguineous 12/09/22 1307   Non-staged Wound Description Full thickness 12/09/22 1307   Dressing Status Intact 12/09/22 1307       Debridement   Wound 11/01/22 Traumatic Pretibial Proximal;Right    Universal Protocol:  Consent: Verbal consent obtained  Written consent obtained  Risks and benefits: risks, benefits and alternatives were discussed  Consent given by: patient  Time out: Immediately prior to procedure a "time out" was called to verify the correct patient, procedure, equipment, support staff and site/side marked as required    Patient understanding: patient states understanding of the procedure being performed  Patient identity confirmed: verbally with patient      Performed by: PA  Debridement type: selective  Pain control: lidocaine 4%  Post-debridement measurements  Length (cm): 0 7  Width (cm): 0 6  Depth (cm): 0 3  Percent debrided: 100%  Surface Area (cm^2): 0 42  Area debrided (cm^2): 0 42  Volume (cm^3): 0 13  Devitalized tissue debrided: biofilm, exudate and slough  Instrument(s) utilized: curette  Bleeding: small  Hemostasis obtained with: pressure  Procedural pain (0-10): 0  Post-procedural pain: 0   Response to treatment: procedure was tolerated well                   Wound Instructions:  Orders Placed This Encounter   Procedures   • Wound cleansing and dressings         • Wound cleansing and dressings      Right Leg wound       Wash your hands with soap and water   Remove old dressing, discard into plastic bag and place in trash   Cleanse the wound with normal saline  prior to applying a clean dressing    Do not use tissue or cotton balls  Do not scrub the wound  Pat dry using gauze  Shower no keep wound clean and dry       Pack with Aquacel Ag packing into the wound  Cover with gauze and Juanita Secure with tape   Change dressing every other day         Standing Status:   Future     Standing Expiration Date:   12/9/2023       Octavio Carrasco PA-C, [facility]      Portions of the record may have been created with voice recognition software  Occasional wrong word or "sound alike" substitutions may have occurred due to the inherent limitations of voice recognition software  Read the chart carefully and recognize, using context, where substitutions have occurred

## 2022-12-09 NOTE — PATIENT INSTRUCTIONS
Orders Placed This Encounter   Procedures    Wound cleansing and dressings          Wound cleansing and dressings      Right Leg wound       Wash your hands with soap and water  Remove old dressing, discard into plastic bag and place in trash  Cleanse the wound with normal saline  prior to applying a clean dressing  Do not use tissue or cotton balls  Do not scrub the wound  Pat dry using gauze  Shower no keep wound clean and dry       Pack with Aquacel Ag packing into the wound     Cover with gauze and Jaunita Secure with tape   Change dressing every other day         Standing Status:   Future     Standing Expiration Date:   12/9/2023

## 2022-12-12 DIAGNOSIS — R91.1 LUNG NODULE SEEN ON IMAGING STUDY: Primary | ICD-10-CM

## 2022-12-16 ENCOUNTER — OFFICE VISIT (OUTPATIENT)
Dept: WOUND CARE | Facility: CLINIC | Age: 82
End: 2022-12-16

## 2022-12-16 VITALS
DIASTOLIC BLOOD PRESSURE: 71 MMHG | HEART RATE: 97 BPM | TEMPERATURE: 95.8 F | SYSTOLIC BLOOD PRESSURE: 151 MMHG | RESPIRATION RATE: 18 BRPM

## 2022-12-16 DIAGNOSIS — S81.801A TRAUMATIC OPEN WOUND OF RIGHT LOWER LEG, INITIAL ENCOUNTER: Primary | ICD-10-CM

## 2022-12-16 DIAGNOSIS — I73.9 PAD (PERIPHERAL ARTERY DISEASE) (HCC): ICD-10-CM

## 2022-12-16 RX ORDER — LIDOCAINE 40 MG/G
CREAM TOPICAL ONCE
Status: COMPLETED | OUTPATIENT
Start: 2022-12-16 | End: 2022-12-16

## 2022-12-16 RX ADMIN — LIDOCAINE: 40 CREAM TOPICAL at 13:07

## 2022-12-16 NOTE — PATIENT INSTRUCTIONS
Orders Placed This Encounter   Procedures    Wound cleansing and dressings     Wound cleansing and dressings                            Right Leg wound       Wash your hands with soap and water  Remove old dressing, discard into plastic bag and place in trash  Cleanse the wound with normal saline  prior to applying a clean dressing  Do not use tissue or cotton balls  Do not scrub the wound  Pat dry using gauze  Shower no keep wound clean and dry       Pack with puracol ag onto the wound     Cover with silicone bordered dressing or gauze and abraham  Change dressing three times a week     Standing Status:   Future     Standing Expiration Date:   12/16/2023

## 2022-12-16 NOTE — PROGRESS NOTES
Patient ID: Wale Sumner is a 79 y o  female Date of Birth 1940       Chief Complaint   Patient presents with   • Follow Up Wound Care Visit     Traumatic open wound right lower leg       Allergies:  Sulfa antibiotics, Formaldehyde, and Codeine    Diagnosis:   Diagnosis ICD-10-CM Associated Orders   1  Traumatic open wound of right lower leg, initial encounter  S81 [id number] lidocaine (LMX) 4 % cream     Wound cleansing and dressings      2  PAD (peripheral artery disease) ([facility])  I73 9            Assessment and Plan :  • F/u evaluation of open traumatic wound on RLE progressively healing  • Debrided as below  • Change wound management to Purachol Ag and covered with Allevyn Life, see wound orders below   • Do not wet nor submerge in water  • Can cleanse with normal saline at dressing changes  • Continue frequent elevation of leg and increase exercise/walking for wound healing  • Continue adequate protein intake (chicken, fish, yogurt, eggs and nuts), 3-4 servings per day to expedite wound healing    • Pt to continue to f/u with Dr Amber Laurent, vascular surgeon  • Followup in  1 week(s) or call sooner with questions or concerns or any signs of infection such as redness, swelling, increased/purulent drainage, fever, chills, increased severe pain  Subjective:   11/1/22  Pt is an 79 y o  Female with pmhx HTN, HLD, CAD s/p CABG x4 (2011, on Effient), Afib (on Xarelto), Tachy-jillian syndrome, PAD, BLE Edema, Depression, Renal artery stenosis, Pulmonary HTN, Sick sinus syndrome, Memory loss, Mild cognitive impairment, b/l Carotid stenosis, Seizure like activity, Cervical spondylosis with cervical radiculopathy who presents for initial eval of RLE open traumatic wound which has been present since 10/3/22 after a fall  Pt was treated in ED, received Tdap vaccine and laceration was repaired with sutures   Pt then followed up with PCP who removed sutures on 10/18/22 and was started on a courses of antibiotics: Keflex on 10/29/22 for laura-wound erythema  Pt has been applying vaseline on the wound bed  Does not have an odor  No diabetes   No smoking, ETOH or drug use  Pt denies any sob, fatigue, N/V, CP, fever or chills  Pt is accompanied by her  who is actively involved in her care  11/8/22:  Patient presents for followup evaluation of RLE open traumatic wound accompanied by her   Has been using Hydrofera Blue on the wound bed securing with Spandifast  Pt denies any fever or chills  11/15/22:  Patient presents for f/u eval of RLE open traumatic wound accompanied by her   Has been using Hydrofera Blue alternating with Polymem on the wound bed securing with Spandifast  Pt was directed to use Polymem but forgets at times and uses Hydrofera blue instead  Pt has a known h/o memory loss  Pt denies any fever or chills  11/22/22: Followup traumatic wound of the right lower extremity  Iodoform packing and Polymem has been used  Only has pain when dressings are changed  Patient has a history of significant PAD and is scheduled to have LEADS done before the end of the year and followup with vascular surgery  12/9/22: F/u traumatic wound evaluation of the RLE  Has been packing with Aquacel Ag  No complaints  12/16/22: F/u traumatic wound evaluation of the RLE  No issues and pain has decreased significantly  Has been packing with Aquacel Ag  Denies fevers or chills        The following portions of the patient's history were reviewed and updated as appropriate:   Patient Active Problem List   Diagnosis   • Essential hypertension   • 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)   • A-fib (HCC)   • 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HCC)   • PAD (peripheral artery disease) (HCC)   • Abnormal liver enzymes   • Edema of left upper extremity   • Urinary retention   • Seizure-like activity (HCC)   • Hyperlipidemia   • Toxic metabolic encephalopathy   • Depression   • Current use of long term anticoagulation • Long term current use of amiodarone   • Renal artery stenosis (HCC)   • Pulmonary hypertension (HCC)   • Sick sinus syndrome (HCC)   • Hx of CABG   • CAD (coronary artery disease)   • Memory loss   • Mild cognitive impairment   • Atherosclerosis with claudication of extremity (HCC)   • Carotid stenosis, asymptomatic, bilateral   • Acute (reversible) ischemia of small intestine, extent unspecified (HCC)   • Cervical disc disorder with radiculopathy of mid-cervical region   • Cervical spondylosis   • Fall     Past Medical History:   Diagnosis Date   • Anal fissure    • Cardiac disorder    • Cognitive changes 12/23/2020   • Esophageal reflux    • Esophagitis, reflux    • Hemorrhoids    • Hepatic hemangioma     Last Assessed: 1/13/2015   • Herpes zoster    • History of colonic polyps    • Hypertension    • Ischemic colitis ([facility] [address] )    • Lumbar herniated disc    • Malignant neoplasm without specification of site [facility])    • Nephrolithiasis     L   Lithotripsy   • Nontoxic single thyroid nodule     Last Assessed: 1/13/2015   • Osteoarthritis    • Overactive bladder    • Raynaud disease    • Respiratory system disease    • Sjogren's disease ([facility] [address] )    • Spinal stenosis    • PONCHO (stress urinary incontinence, female)    • Uterovaginal prolapse     Grade I-II     Past Surgical History:   Procedure Laterality Date   • APPENDECTOMY  1947   • CARDIAC PACEMAKER PLACEMENT  01/2021   • CARDIAC SURGERY      CABG   • CATARACT EXTRACTION Bilateral    • COLONOSCOPY  2012    Fiberoptic   • COLONOSCOPY      Resolved: 2006 - 2012 5 year f/u   • CORONARY ANGIOPLASTY WITH STENT PLACEMENT     • CORONARY ARTERY BYPASS GRAFT      Resolved: 2012   • ESOPHAGOGASTRODUODENOSCOPY  2012    Diagnostic   • HEMORROIDECTOMY     • KNEE SURGERY     • LITHOTRIPSY      Renal   • MALIGNANT SKIN LESION EXCISION      Face; Resolved: 2004   • [place] ESOPHAGOGASTRODUODENOSCOPY TRANSORAL DIAGNOSTIC N/A 4/13/2016    Procedure: EGD AND COLONOSCOPY;  Surgeon: Bev Vela Yocasta Solo MD;  Location: [facility]; Service: Gastroenterology   • RENAL ARTERY STENT     • SKIN LESION EXCISION      Scalp   • SOFT TISSUE TUMOR RESECTION      Shoulder; Resolved: 1995   • THROMBOLYSIS      Postoperative Thrombolysis PTCA   • TONSILLECTOMY      Resolved: 1944     Family History   Problem Relation Age of Onset   • Heart disease Mother    • Hypertension Mother    • Osteoporosis Mother    • Heart disease Father    • Hypertension Father    • No Known Problems Sister    • Heart disease Brother    • Diabetes Brother    • No Known Problems Daughter    • No Known Problems Son    • No Known Problems Maternal Grandmother    • No Known Problems Maternal Grandfather    • No Known Problems Paternal Grandmother    • No Known Problems Paternal Grandfather    • Ulcerative colitis Family    • Colon polyps Family    • Breast cancer Neg Hx    • Colon cancer Neg Hx    • Ovarian cancer Neg Hx      Social History     Socioeconomic History   • Marital status: /Civil Union     Spouse name: None   • Number of children: None   • Years of education: None   • Highest education level: None   Occupational History   • Occupation: retired   Tobacco Use   • Smoking status: Never   • Smokeless tobacco: Never   Vaping Use   • Vaping Use: Never used   Substance and Sexual Activity   • Alcohol use:  Yes     Alcohol/week: 1 0 standard drink     Types: 1 Glasses of wine per week     Comment: occas   • Drug use: Never   • Sexual activity: Not Currently   Other Topics Concern   • None   Social History Narrative    Activities: golf    Caffeine use    Consumes healthy diet    Daily caffeinated coffee consumption: 1 cup per day    Drinks caffeinated tea: 1 cup per day    Well balanced diet     Social Determinants of Health     Financial Resource Strain: Not on file   Food Insecurity: Not on file   Transportation Needs: Not on file   Physical Activity: Not on file   Stress: Not on file   Social Connections: Not on file   Intimate Partner Violence: Not on file   Housing Stability: Not on file       Current Outpatient Medications:   •  acetaminophen (TYLENOL) 325 mg tablet, Take 2 tablets (650 mg total) by mouth every 6 (six) hours as needed for mild pain, Disp: , Rfl: 0  •  amiodarone 200 mg tablet, Take 0 5 tablets (100 mg total) by mouth daily with breakfast, Disp: 45 tablet, Rfl: 3  •  amLODIPine (NORVASC) 5 mg tablet, Take 1 tablet (5 mg total) by mouth daily (Patient not taking: Reported on 11/1/2022), Disp: 90 tablet, Rfl: 3  •  atorvastatin (LIPITOR) 20 mg tablet, TAKE 1 TABLET BY MOUTH EVERY DAY WITH DINNER, Disp: 30 tablet, Rfl: 8  •  bumetanide (BUMEX) 2 mg tablet, TAKE 1 TABLET BY MOUTH EVERY DAY, Disp: 30 tablet, Rfl: 8  •  gabapentin (NEURONTIN) 100 mg capsule, Take 1 tablet at bedtime  May increase to 2 tablets after 3 days (Patient taking differently: 100 mg 2 (two) times a day Take 1 tablet at bedtime  May increase to 2 tablets after 3 days), Disp: 60 capsule, Rfl: 1  •  levETIRAcetam (KEPPRA) 500 mg tablet, Take 1 tablet (500 mg total) by mouth every 12 (twelve) hours, Disp: 60 tablet, Rfl: 3  •  lidocaine (Lidoderm) 5 %, Apply 1 patch topically daily Remove & Discard patch within 12 hours or as directed by MD, Disp: 15 patch, Rfl: 0  •  magnesium oxide (MAG-OX) 400 mg, Take 1 tablet (400 mg total) by mouth 2 (two) times a day before lunch and dinner (Patient not taking: Reported on 11/1/2022), Disp: , Rfl: 0  •  methocarbamol (ROBAXIN) 500 mg tablet, Take 1 tablet (500 mg total) by mouth 3 (three) times a day (Patient not taking: Reported on 11/30/2022), Disp: 30 tablet, Rfl: 1  •  metoprolol succinate (TOPROL-XL) 25 mg 24 hr tablet, TAKE 1 TABLET BY MOUTH EVERY DAY, Disp: 30 tablet, Rfl: 8  •  multivitamin (THERAGRAN) TABS, Take 1 tablet by mouth daily  , Disp: , Rfl:   •  nitroglycerin (NITROSTAT) 0 4 mg SL tablet, Place 1 tablet (0 4 mg total) under the tongue every 5 (five) minutes as needed for chest pain, Disp: 25 tablet, Rfl: 0  •  potassium chloride (K-DUR,KLOR-CON) 20 mEq tablet, Take 2 tablets (40 mEq total) by mouth daily (Patient not taking: Reported on 11/1/2022), Disp: 60 tablet, Rfl: 8  •  prasugrel (EFFIENT) tablet, TAKE 1 TABLET BY MOUTH EVERY DAY, Disp: 30 tablet, Rfl: 11  •  sertraline (ZOLOFT) 25 mg tablet, TAKE 1 TABLET BY MOUTH EVERY DAY, Disp: 90 tablet, Rfl: 0  •  Xarelto 15 MG tablet, TAKE 1 TABLET (15 MG TOTAL) BY MOUTH DAILY WITH DINNER, Disp: 30 tablet, Rfl: 8  No current facility-administered medications for this visit  Review of Systems   Constitutional: Negative for appetite change, chills, fatigue, fever and unexpected weight change  HENT: Negative for congestion, hearing loss and postnasal drip  Respiratory: Negative for cough and shortness of breath  Cardiovascular: Positive for leg swelling  Skin: Positive for wound (RLE)  Negative for rash  Neurological: Negative for numbness  Hematological: Does not bruise/bleed easily  Psychiatric/Behavioral: Negative  Objective:  /71   Pulse 97   Temp (!) 95 8 °F (35 4 °C)   Resp 18   Pain Score: 0-No pain     Physical Exam  Vitals reviewed  Constitutional:       General: She is not in acute distress  Appearance: Normal appearance  She is well-developed and normal weight  HENT:      Head: Normocephalic and atraumatic  Cardiovascular:      Rate and Rhythm: Normal rate  Pulmonary:      Effort: Pulmonary effort is normal    Musculoskeletal:         General: No deformity  Right lower leg: No edema  Left lower leg: No edema  Skin:     General: Skin is warm and dry  Findings: Wound (RLE) present  No erythema  Comments: Some biofilm and slough on pink granulated tissue  Decreased in size  See wound assessment  Neurological:      General: No focal deficit present  Mental Status: She is alert and oriented to person, place, and time        Gait: Gait normal    Psychiatric:         Mood and Affect: Mood and affect normal          Behavior: Behavior normal  Behavior is cooperative  Wound 11/01/22 Traumatic Pretibial Proximal;Right (Active)   Wound Image Images linked 12/16/22 1308   Wound Description Yellow;Pink;Eschar 12/16/22 1306   Wound Length (cm) 0 5 cm 12/16/22 1306   Wound Width (cm) 0 9 cm 12/16/22 1306   Wound Depth (cm) 0 2 cm 12/16/22 1306   Wound Surface Area (cm^2) 0 45 cm^2 12/16/22 1306   Wound Volume (cm^3) 0 09 cm^3 12/16/22 1306   Calculated Wound Volume (cm^3) 0 09 cm^3 12/16/22 1306   Change in Wound Size % 89 16 12/16/22 1306   Drainage Amount Scant 12/16/22 1306   Drainage Description Serosanguineous 12/16/22 1306   Non-staged Wound Description Full thickness 12/16/22 1306   Dressing Status Intact 12/16/22 1306       Debridement   Wound 11/01/22 Traumatic Pretibial Proximal;Right    Universal Protocol:  Consent: Verbal consent obtained  Written consent obtained  Risks and benefits: risks, benefits and alternatives were discussed  Consent given by: patient  Time out: Immediately prior to procedure a "time out" was called to verify the correct patient, procedure, equipment, support staff and site/side marked as required    Patient understanding: patient states understanding of the procedure being performed  Patient identity confirmed: verbally with patient      Performed by: PA  Debridement type: selective  Pain control: lidocaine 4%  Post-debridement measurements  Length (cm): 0 5  Width (cm): 0 9  Depth (cm): 0 2  Percent debrided: 100%  Surface Area (cm^2): 0 45  Area debrided (cm^2): 0 45  Volume (cm^3): 0 09  Devitalized tissue debrided: biofilm, exudate and fibrin  Instrument(s) utilized: curette  Bleeding: small  Hemostasis obtained with: pressure  Procedural pain (0-10): 0  Post-procedural pain: 0   Response to treatment: procedure was tolerated well             Wound Instructions:  Orders Placed This Encounter   Procedures   • Wound cleansing and dressings     Wound cleansing and dressings                            Right Leg wound       Wash your hands with soap and water  Erleen Rosa old dressing, discard into plastic bag and place in trash   Cleanse the wound with normal saline  prior to applying a clean dressing    Do not use tissue or cotton balls  Do not scrub the wound  Pat dry using gauze  Shower no keep wound clean and dry       Pack with puracol ag onto the wound  Cover with silicone bordered dressing or gauze and abraham  Change dressing three times a week     Standing Status:   Future     Standing Expiration Date:   12/16/2023       Aylin Miller PA-C, [facility]      Portions of the record may have been created with voice recognition software  Occasional wrong word or "sound alike" substitutions may have occurred due to the inherent limitations of voice recognition software  Read the chart carefully and recognize, using context, where substitutions have occurred

## 2022-12-19 ENCOUNTER — HOSPITAL ENCOUNTER (OUTPATIENT)
Dept: VASCULAR ULTRASOUND | Facility: HOSPITAL | Age: 82
Discharge: HOME | End: 2022-12-19

## 2022-12-19 DIAGNOSIS — I65.23 CAROTID STENOSIS, BILATERAL: ICD-10-CM

## 2022-12-20 NOTE — PROGRESS NOTES
Assessment/Plan:    Wound of right leg  42-year-old female with HTN, HLD, CAD h/o CABG, A  fib (Xarelto), renal artery stenosis, PAD, and asymptomatic bilateral carotid artery stenosis presents with complaint of RLE wound for fall 11/30/2022  Patient has been following with wound care    -Presents with small, dry, scabbed RLE shin wound  Patient reports she fell 11/30 and wound has been healing ever since  No drainage, separation, erythema, or signs of infection  -Patient follows with wound care  Initially recommended to follow-up with vascular for healing potential  -No edema  -Patient with known PAD/tibial peroneal disease  She denies pain  -Right DP Doppler signal appreciated, absent right PT (known occlusion)    Plan:  -Continue local wound care as prescribed by podiatry     Call with Compufirstilex border  -Return to office with worsening or stalled healing  -No further vascular imaging or intervention indicated at this time   -Continue Effient, Xarelto  - Continue statin  -Call or return to office with new or worsening symptoms or concerns  -Follow-up in 6 months after planned routine surveillance lower extremity duplex    Carotid stenosis, asymptomatic, bilateral  - Patient presents for RLE skin wound evaluation, however recently had carotid duplex imaging   -Reviewed carotid duplex results with patient  Slight progression of disease and R ICA, 50-69 % stenosis  L ICA 50-69%  -Patient asymptomatic  Denies TIA or strokelike symptoms  Neuro exam intact  -Repeat carotid duplex in 6 months per protocol patient will return at that time to review carotid duplex and lower extremity arterial duplex imaging   -Continue Xarelto and Effient  -Continue statin       Diagnoses and all orders for this visit:    Carotid stenosis, asymptomatic, bilateral  -     VAS carotid complete study;  Future    PAD (peripheral artery disease) ([facility] [address] )  -     Ambulatory referral to Vascular Surgery    Wound of right lower extremity, initial encounter    Other orders  -     oxyCODONE (ROXICODONE) 5 immediate release tablet          Subjective:      Patient ID: Hira Holt is a 79 y o  female  Patient present to follow on Wound  Patient states she had a fall back on November 30th of this year and has a small wound on her right keg  Patient denies any pain, burning or draining coming from the wound  Patient keeps it covered with a small mepilex  Patient is currently taking Atorvastatin and Xarelto  HPI  See assessment and plan      The following portions of the patient's history were reviewed and updated as appropriate: allergies, current medications, past family history, past medical history, past social history, past surgical history and problem list     Review of Systems   Constitutional: Negative  HENT: Negative  Eyes: Negative  Respiratory: Negative  Cardiovascular: Negative  Gastrointestinal: Negative  Endocrine: Negative  Genitourinary: Negative  Musculoskeletal: Negative  Skin: Negative  Allergic/Immunologic: Negative  Neurological: Negative  Hematological: Negative  Psychiatric/Behavioral: Negative  I have reviewed and made appropriate changes to the review of systems input by the medical assistant  Objective:      /76 (BP Location: Left arm, Patient Position: Sitting, Cuff Size: Adult)   Pulse 86   Ht 5' (1 524 m)   Wt 64 kg (141 lb)   BMI 27 54 kg/m²          Physical Exam  Vitals reviewed  Constitutional:       Appearance: Normal appearance  HENT:      Head: Normocephalic and atraumatic  Neck:      Vascular: No carotid bruit  Cardiovascular:      Rate and Rhythm: Normal rate  Pulses:           Carotid pulses are 2+ on the right side and 2+ on the left side  Radial pulses are 2+ on the right side and 2+ on the left side  Dorsalis pedis pulses are detected w/ Doppler on the right side and detected w/ Doppler on the left side  Posterior tibial pulses are 0 on the right side and detected w/ Doppler on the left side  Heart sounds: Normal heart sounds  No murmur heard  Pulmonary:      Effort: Pulmonary effort is normal  No respiratory distress  Breath sounds: Normal breath sounds  Musculoskeletal:         General: Normal range of motion  Cervical back: Normal range of motion and neck supple  Right lower leg: No edema  Left lower leg: No edema  Skin:     General: Skin is warm and dry  Capillary Refill: Capillary refill takes less than 2 seconds  Comments: Right anterior shin wound  Dry,  No drainage, erythema or signs of infection  Clinical picture below   Neurological:      General: No focal deficit present  Mental Status: She is alert and oriented to person, place, and time  Sensory: No sensory deficit  Motor: No weakness     Psychiatric:         Behavior: Behavior normal                  Vitals:    12/21/22 1003   BP: 122/76   BP Location: Left arm   Patient Position: Sitting   Cuff Size: Adult   Pulse: 86   Weight: 64 kg (141 lb)   Height: 5' (1 524 m)       Patient Active Problem List   Diagnosis   • Essential hypertension   • 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)   • A-fib ([facility])   • 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[facility])   • PAD (peripheral artery disease) ([facility])   • Abnormal liver enzymes   • Edema of left upper extremity   • Urinary retention   • Seizure-like activity ([facility])   • Hyperlipidemia   • Toxic metabolic encephalopathy   • Depression   • Current use of long term anticoagulation   • Long term current use of amiodarone   • Renal artery stenosis ([facility])   • Pulmonary hypertension ([facility])   • Sick sinus syndrome ([facility])   • Hx of CABG   • CAD (coronary artery disease)   • Memory loss   • Mild cognitive impairment   • Atherosclerosis with claudication of extremity ([facility])   • Carotid stenosis, asymptomatic, bilateral   • Acute (reversible) ischemia of small intestine, extent unspecified [facility])   • Cervical disc disorder with radiculopathy of mid-cervical region   • Cervical spondylosis   • Fall   • Wound of right leg       Past Surgical History:   Procedure Laterality Date   • APPENDECTOMY  1947   • CARDIAC PACEMAKER PLACEMENT  01/2021   • CARDIAC SURGERY      CABG   • CATARACT EXTRACTION Bilateral    • COLONOSCOPY  2012    Fiberoptic   • COLONOSCOPY      Resolved: 2006 - 2012 5 year f/u   • CORONARY ANGIOPLASTY WITH STENT PLACEMENT     • CORONARY ARTERY BYPASS GRAFT      Resolved: 2012   • ESOPHAGOGASTRODUODENOSCOPY  2012    Diagnostic   • HEMORROIDECTOMY     • KNEE SURGERY     • LITHOTRIPSY      Renal   • MALIGNANT SKIN LESION EXCISION      Face; Resolved: 2004   • ND ESOPHAGOGASTRODUODENOSCOPY TRANSORAL DIAGNOSTIC N/A 4/13/2016    Procedure: EGD AND COLONOSCOPY;  Surgeon: Logan Witt MD;  [facility];   Service: Gastroenterology   • RENAL ARTERY STENT     • SKIN LESION EXCISION      Scalp   • SOFT TISSUE TUMOR RESECTION      Shoulder; Resolved: 1995   • THROMBOLYSIS      Postoperative Thrombolysis PTCA   • TONSILLECTOMY      Resolved: 1944       Family History   Problem Relation Age of Onset   • Heart disease Mother    • Hypertension Mother    • Osteoporosis Mother    • Heart disease Father    • Hypertension Father    • No Known Problems Sister    • Heart disease Brother    • Diabetes Brother    • No Known Problems Daughter    • No Known Problems Son    • No Known Problems Maternal Grandmother    • No Known Problems Maternal Grandfather    • No Known Problems Paternal Grandmother    • No Known Problems Paternal Grandfather    • Ulcerative colitis Family    • Colon polyps Family    • Breast cancer Neg Hx    • Colon cancer Neg Hx    • Ovarian cancer Neg Hx        Social History     Socioeconomic History   • Marital status: /Civil Union     Spouse name: Not on file   • Number of children: Not on file   • Years of education: Not on file   • Highest education level: Not on file   Occupational History   • Occupation: retired   Tobacco Use   • Smoking status: Never   • Smokeless tobacco: Never   Vaping Use   • Vaping Use: Never used   Substance and Sexual Activity   • Alcohol use: Yes     Alcohol/week: 1 0 standard drink     Types: 1 Glasses of wine per week     Comment: occas   • Drug use: Never   • Sexual activity: Not Currently   Other Topics Concern   • Not on file   Social History Narrative    Activities: golf    Caffeine use    Consumes healthy diet    Daily caffeinated coffee consumption: 1 cup per day    Drinks caffeinated tea: 1 cup per day    Well balanced diet     Social Determinants of Health     Financial Resource Strain: Not on file   Food Insecurity: Not on file   Transportation Needs: Not on file   Physical Activity: Not on file   Stress: Not on file   Social Connections: Not on file   Intimate Partner Violence: Not on file   Housing Stability: Not on file       Allergies   Allergen Reactions   • Sulfa Antibiotics Anaphylaxis   • Formaldehyde    • Codeine Palpitations         Current Outpatient Medications:   •  acetaminophen (TYLENOL) 325 mg tablet, Take 2 tablets (650 mg total) by mouth every 6 (six) hours as needed for mild pain, Disp: , Rfl: 0  •  amiodarone 200 mg tablet, Take 0 5 tablets (100 mg total) by mouth daily with breakfast, Disp: 45 tablet, Rfl: 3  •  amLODIPine (NORVASC) 5 mg tablet, Take 1 tablet (5 mg total) by mouth daily, Disp: 90 tablet, Rfl: 3  •  atorvastatin (LIPITOR) 20 mg tablet, TAKE 1 TABLET BY MOUTH EVERY DAY WITH DINNER, Disp: 30 tablet, Rfl: 8  •  bumetanide (BUMEX) 2 mg tablet, TAKE 1 TABLET BY MOUTH EVERY DAY, Disp: 30 tablet, Rfl: 8  •  gabapentin (NEURONTIN) 100 mg capsule, Take 1 tablet at bedtime  May increase to 2 tablets after 3 days (Patient taking differently: 100 mg 2 (two) times a day Take 1 tablet at bedtime    May increase to 2 tablets after 3 days), Disp: 60 capsule, Rfl: 1  •  levETIRAcetam (KEPPRA) 500 mg tablet, Take 1 tablet (500 mg total) by mouth every 12 (twelve) hours, Disp: 60 tablet, Rfl: 3  •  lidocaine (Lidoderm) 5 %, Apply 1 patch topically daily Remove & Discard patch within 12 hours or as directed by MD, Disp: 15 patch, Rfl: 0  •  magnesium oxide (MAG-OX) 400 mg, Take 1 tablet (400 mg total) by mouth 2 (two) times a day before lunch and dinner, Disp: , Rfl: 0  •  methocarbamol (ROBAXIN) 500 mg tablet, Take 1 tablet (500 mg total) by mouth 3 (three) times a day, Disp: 30 tablet, Rfl: 1  •  metoprolol succinate (TOPROL-XL) 25 mg 24 hr tablet, TAKE 1 TABLET BY MOUTH EVERY DAY, Disp: 30 tablet, Rfl: 8  •  multivitamin (THERAGRAN) TABS, Take 1 tablet by mouth daily  , Disp: , Rfl:   •  nitroglycerin (NITROSTAT) 0 4 mg SL tablet, Place 1 tablet (0 4 mg total) under the tongue every 5 (five) minutes as needed for chest pain, Disp: 25 tablet, Rfl: 0  •  potassium chloride (K-DUR,KLOR-CON) 20 mEq tablet, Take 2 tablets (40 mEq total) by mouth daily, Disp: 60 tablet, Rfl: 8  •  prasugrel (EFFIENT) tablet, TAKE 1 TABLET BY MOUTH EVERY DAY, Disp: 30 tablet, Rfl: 11  •  sertraline (ZOLOFT) 25 mg tablet, TAKE 1 TABLET BY MOUTH EVERY DAY, Disp: 90 tablet, Rfl: 0  •  Xarelto 15 MG tablet, TAKE 1 TABLET (15 MG TOTAL) BY MOUTH DAILY WITH DINNER, Disp: 30 tablet, Rfl: 8  •  oxyCODONE (ROXICODONE) 5 immediate release tablet, , Disp: , Rfl:

## 2022-12-21 ENCOUNTER — OFFICE VISIT (OUTPATIENT)
Dept: VASCULAR SURGERY | Facility: CLINIC | Age: 82
End: 2022-12-21

## 2022-12-21 VITALS
HEIGHT: 60 IN | HEART RATE: 86 BPM | WEIGHT: 141 LBS | BODY MASS INDEX: 27.68 KG/M2 | SYSTOLIC BLOOD PRESSURE: 122 MMHG | DIASTOLIC BLOOD PRESSURE: 76 MMHG

## 2022-12-21 DIAGNOSIS — S81.801A WOUND OF RIGHT LOWER EXTREMITY, INITIAL ENCOUNTER: ICD-10-CM

## 2022-12-21 DIAGNOSIS — I73.9 PAD (PERIPHERAL ARTERY DISEASE) (HCC): ICD-10-CM

## 2022-12-21 DIAGNOSIS — I65.23 CAROTID STENOSIS, ASYMPTOMATIC, BILATERAL: Primary | ICD-10-CM

## 2022-12-21 RX ORDER — OXYCODONE HYDROCHLORIDE 5 MG/1
TABLET ORAL
COMMUNITY
Start: 2022-12-02

## 2022-12-21 NOTE — PATIENT INSTRUCTIONS
No signs or concern for infection to R shin wound  Continue wound care as outlined at wound care appointment  Continue follow-up with wound care as scheduled  Lower extremity arterial duplex in June as previously scheduled  Repeat carotid duplex in 6 months (June)  Return to office after imaging  Call or return to office sooner with any questions or concerns or changes to right shin wound  Carotid Artery Disease   AMBULATORY CARE:   Carotid artery disease (CAD)  means the major blood vessels in your neck are narrowed or becoming blocked  These 2 major blood vessels are called the carotid arteries  They supply your brain with blood  The narrow or blocked blood vessels increase your risk for a stroke  CAD is also called carotid artery stenosis  Have someone call your local emergency number (911 in the [address] [facility]) if:   You have any of the following signs of a stroke:      Numbness or drooping on one side of your face     Weakness in an arm or leg    Confusion or difficulty speaking    Dizziness, a severe headache, or vision loss    You have any of the following signs of a heart attack:      Squeezing, pressure, or pain in your chest    You may  also have any of the following:     Discomfort or pain in your back, neck, jaw, stomach, or arm    Shortness of breath    Nausea or vomiting    Lightheadedness or a sudden cold sweat    Call your doctor if:   You have questions or concerns about your condition or care  Common signs and symptoms:  CAD develops slowly  You may have no signs or symptoms until you have a mini-stroke, or transient ischemic attack (TIA)  A TIA is a temporary lack of blood flow to your brain  A TIA goes away quickly and does not cause permanent damage  A TIA may be a warning sign that you are about to have a stroke  If you have any symptoms of a TIA or stroke, seek care immediately  Warning signs of a stroke:   The words BE FAST can help you remember and recognize warning signs of a stroke:  B = Balance:  Sudden loss of balance    E = Eyes:  Loss of vision in one or both eyes    F = Face:  Face droops on one side    A = Arms:  Arm drops when both arms are raised    S = Speech:  Speech is slurred or sounds different    T = Time:  Time to get help immediately       Treatment  depends on how narrow your arteries have become  Treatment also depends on your symptoms and your general health  The goal of treatment is to lower your risk for a stroke  You may need any of the following:  Medicines:      Aspirin,  or other blood thinner, may be recommended  These will help prevent blood clots from forming in your carotid arteries  If your healthcare provider wants you to take aspirin, do not take acetaminophen or ibuprofen instead  Cholesterol medicine  lowers your cholesterol level  Blood pressure medicine  lowers or helps control your blood pressure  Procedures  can help open blocked arteries:     Carotid endarterectomy (CEA)  is used to cut and remove plaque buildup from your arteries  Carotid angioplasty and stenting (WU)  is used to push the plaque against the artery wall with a balloon device  Then, a stent is placed to keep the artery open  A stent is a small metal mesh tube  Prevent a stroke:   Do not smoke, and avoid secondhand smoke  Nicotine and other chemicals in cigarettes and cigars increase your risk for a stroke  Ask your healthcare provider for information if you currently smoke and need help to quit  E-cigarettes or smokeless tobacco still contain nicotine  Talk to your healthcare provider before you use these products  Eat a variety of healthy foods  Healthy foods include fruit, vegetables, whole-grain breads, low-fat dairy products, chicken, and fish  Choose fish that are high in omega-3 fatty acids, such as salmon and fresh tuna  Ask your healthcare provider for more information on a heart healthy diet and the DASH eating plan  Limit sodium (salt)    Sodium may increase your blood pressure  Add less table salt to your foods  Read food labels and choose foods that are low in sodium  Your healthcare provider may suggest you follow a low sodium diet  Reach or maintain a healthy weight  Extra weight makes your heart work harder  Ask your healthcare provider what a healthy weight is for you  He or she can help you create a safe weight loss plan  Even a weight loss of 10% of your extra body weight can help your heart function better  Exercise regularly  Exercise helps improve heart function and can help you manage your weight  Exercise can also help lower your cholesterol and blood sugar levels  Try to get at least 30 minutes of exercise 5 times each week  Try to be physically active every day  This may include walking, riding a bicycle, or swimming  Your healthcare provider can help you create an exercise plan that works best for you  Limit alcohol  Alcohol can increase your blood pressure and triglyceride levels  A drink of alcohol is 12 ounces of beer, 5 ounces of wine, or 1½ ounces of liquor  Follow up with your doctor as directed:  Write down your questions so you remember to ask them during your visits  © Copyright [address] 2022 Information is for End User's use only and may not be sold, redistributed or otherwise used for commercial purposes  All illustrations and images included in CareNotes® are the copyrighted property of [employer]  or [facility] Abby Wade  The above information is an  only  It is not intended as medical advice for individual conditions or treatments  Talk to your doctor, nurse or pharmacist before following any medical regimen to see if it is safe and effective for you

## 2022-12-22 PROBLEM — S81.801A WOUND OF RIGHT LEG: Status: ACTIVE | Noted: 2022-12-22

## 2022-12-22 NOTE — ASSESSMENT & PLAN NOTE
58-year-old female with HTN, HLD, CAD h/o CABG, A  fib (Xarelto), renal artery stenosis, PAD, and asymptomatic bilateral carotid artery stenosis presents with complaint of RLE wound for fall 11/30/2022  Patient has been following with wound care    -Presents with small, dry, scabbed RLE shin wound  Patient reports she fell 11/30 and wound has been healing ever since  No drainage, separation, erythema, or signs of infection  -Patient follows with wound care  Initially recommended to follow-up with vascular for healing potential  -No edema  -Patient with known PAD/tibial peroneal disease  She denies pain  -Right DP Doppler signal appreciated, absent right PT (known occlusion)    Plan:  -Continue local wound care as prescribed by podiatry     Call with Mepilex border  -Return to office with worsening or stalled healing  -No further vascular imaging or intervention indicated at this time   -Continue Effient, Xarelto  - Continue statin  -Call or return to office with new or worsening symptoms or concerns  -Follow-up in 6 months after planned routine surveillance lower extremity duplex

## 2022-12-22 NOTE — ASSESSMENT & PLAN NOTE
- Patient presents for RLE skin wound evaluation, however recently had carotid duplex imaging   -Reviewed carotid duplex results with patient  Slight progression of disease and R ICA, 50-69 % stenosis  L ICA 50-69%  -Patient asymptomatic  Denies TIA or strokelike symptoms  Neuro exam intact    -Repeat carotid duplex in 6 months per protocol patient will return at that time to review carotid duplex and lower extremity arterial duplex imaging   -Continue Xarelto and Effient  -Continue statin

## 2022-12-29 ENCOUNTER — HOSPITAL ENCOUNTER (OUTPATIENT)
Dept: RADIOLOGY | Facility: MEDICAL CENTER | Age: 82
End: 2022-12-29

## 2022-12-29 DIAGNOSIS — Z78.0 ASYMPTOMATIC POSTMENOPAUSAL STATE: ICD-10-CM

## 2022-12-29 DIAGNOSIS — Z13.820 SCREENING FOR OSTEOPOROSIS: ICD-10-CM

## 2022-12-30 ENCOUNTER — OFFICE VISIT (OUTPATIENT)
Dept: WOUND CARE | Facility: CLINIC | Age: 82
End: 2022-12-30

## 2022-12-30 VITALS
HEART RATE: 81 BPM | DIASTOLIC BLOOD PRESSURE: 63 MMHG | TEMPERATURE: 96.3 F | SYSTOLIC BLOOD PRESSURE: 139 MMHG | RESPIRATION RATE: 18 BRPM

## 2022-12-30 DIAGNOSIS — S81.801A TRAUMATIC OPEN WOUND OF RIGHT LOWER LEG, INITIAL ENCOUNTER: Primary | ICD-10-CM

## 2022-12-30 DIAGNOSIS — I73.9 PAD (PERIPHERAL ARTERY DISEASE) (HCC): ICD-10-CM

## 2022-12-30 NOTE — PROGRESS NOTES
Patient ID: Damaris Larry is a 79 y o  female Date of Birth 1940       Chief Complaint   Patient presents with   • Follow Up Wound Care Visit     Right lower leg wound       Allergies:  Sulfa antibiotics, Formaldehyde, and Codeine    Diagnosis:   Diagnosis ICD-10-CM Associated Orders   1  Traumatic open wound of right lower leg, initial encounter  S81 801A Wound cleansing and dressings      2  PAD (peripheral artery disease) ([facility])  I73 9            Assessment and Plan :  F/u evaluation of open traumatic wound on RLE Wound is completely epithelialized  Keep the area covered and protected for about another week   Discussed the importance of long-term edema control and use of long-term compression  Recommended 15-20 mmHg compression stockings  Counseled on importance of frequent elevation of leg and increase exercise/walking  Moisturize skin daily with skin nourishing cream    Follow up as needed or call with questions or concerns    Subjective:   11/1/22  Pt is an 79 y o  Female with pmhx HTN, HLD, CAD s/p CABG x4 (2011, on Effient), Afib (on Xarelto), Tachy-jillian syndrome, PAD, BLE Edema, Depression, Renal artery stenosis, Pulmonary HTN, Sick sinus syndrome, Memory loss, Mild cognitive impairment, b/l Carotid stenosis, Seizure like activity, Cervical spondylosis with cervical radiculopathy who presents for initial eval of RLE open traumatic wound which has been present since 10/3/22 after a fall  Pt was treated in ED, received Tdap vaccine and laceration was repaired with sutures  Pt then followed up with PCP who removed sutures on 10/18/22 and was started on a courses of antibiotics: Keflex on 10/29/22 for laura-wound erythema  Pt has been applying vaseline on the wound bed  Does not have an odor  No diabetes   No smoking, ETOH or drug use  Pt denies any sob, fatigue, N/V, CP, fever or chills  Pt is accompanied by her  who is actively involved in her care      11/8/22:  Patient presents for followup evaluation of RLE open traumatic wound accompanied by her   Has been using Hydrofera Blue on the wound bed securing with Spandifast  Pt denies any fever or chills  11/15/22:  Patient presents for f/u eval of RLE open traumatic wound accompanied by her   Has been using Hydrofera Blue alternating with Polymem on the wound bed securing with Spandifast  Pt was directed to use Polymem but forgets at times and uses Hydrofera blue instead  Pt has a known h/o memory loss  Pt denies any fever or chills  11/22/22: Followup traumatic wound of the right lower extremity  Iodoform packing and Polymem has been used  Only has pain when dressings are changed  Patient has a history of significant PAD and is scheduled to have LEADS done before the end of the year and followup with vascular surgery  12/9/22: F/u traumatic wound evaluation of the RLE  Has been packing with Aquacel Ag  No complaints  12/16/22: F/u traumatic wound evaluation of the RLE  No issues and pain has decreased significantly  Has been packing with Aquacel Ag  Denies fevers or chills  12/30/22: F/u traumatic wound evaluation of the RLE  Pt evaluated by vascular, Dr Corinne Arenas who currently does not recommend any intervention  Has been packing with Aquacel Ag  Denies fevers or chills              The following portions of the patient's history were reviewed and updated as appropriate:   Patient Active Problem List   Diagnosis   • Essential hypertension   • 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)   • A-fib (HCC)   • 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HCC)   • PAD (peripheral artery disease) (HCC)   • Abnormal liver enzymes   • Edema of left upper extremity   • Urinary retention   • Seizure-like activity (HCC)   • Hyperlipidemia   • Toxic metabolic encephalopathy   • Depression   • Current use of long term anticoagulation   • Long term current use of amiodarone   • Renal artery stenosis (HCC)   • Pulmonary hypertension [facility])   • Sick sinus syndrome (HCC)   • Hx of CABG   • CAD (coronary artery disease)   • Memory loss   • Mild cognitive impairment   • Atherosclerosis with claudication of extremity (HCC)   • Carotid stenosis, asymptomatic, bilateral   • Acute (reversible) ischemia of small intestine, extent unspecified (HCC)   • Cervical disc disorder with radiculopathy of mid-cervical region   • Cervical spondylosis   • Fall   • Wound of right leg     Past Medical History:   Diagnosis Date   • Anal fissure    • Cardiac disorder    • Cognitive changes 12/23/2020   • Esophageal reflux    • Esophagitis, reflux    • Hemorrhoids    • Hepatic hemangioma     Last Assessed: 1/13/2015   • Herpes zoster    • History of colonic polyps    • Hypertension    • Ischemic colitis ([facility] [address] )    • Lumbar herniated disc    • Malignant neoplasm without specification of site [facility])    • Nephrolithiasis     L  Lithotripsy   • Nontoxic single thyroid nodule     Last Assessed: 1/13/2015   • Osteoarthritis    • Overactive bladder    • Raynaud disease    • Respiratory system disease    • Sjogren's disease ([facility] [address] )    • Spinal stenosis    • PONCHO (stress urinary incontinence, female)    • Uterovaginal prolapse     Grade I-II     Past Surgical History:   Procedure Laterality Date   • APPENDECTOMY  1947   • CARDIAC PACEMAKER PLACEMENT  01/2021   • CARDIAC SURGERY      CABG   • CATARACT EXTRACTION Bilateral    • COLONOSCOPY  2012    Fiberoptic   • COLONOSCOPY      Resolved: 2006 - 2012 5 year f/u   • CORONARY ANGIOPLASTY WITH STENT PLACEMENT     • CORONARY ARTERY BYPASS GRAFT      Resolved: 2012   • ESOPHAGOGASTRODUODENOSCOPY  2012    Diagnostic   • HEMORROIDECTOMY     • KNEE SURGERY     • LITHOTRIPSY      Renal   • MALIGNANT SKIN LESION EXCISION      Face; Resolved: 2004   • RI ESOPHAGOGASTRODUODENOSCOPY TRANSORAL DIAGNOSTIC N/A 4/13/2016    Procedure: EGD AND COLONOSCOPY;  Surgeon: Mary Toney MD;  [facility];   Service: Gastroenterology   • RENAL ARTERY STENT     • SKIN LESION EXCISION      Scalp   • SOFT TISSUE TUMOR RESECTION      Shoulder; Resolved: 1995   • THROMBOLYSIS      Postoperative Thrombolysis PTCA   • TONSILLECTOMY      Resolved: 1944     Family History   Problem Relation Age of Onset   • Heart disease Mother    • Hypertension Mother    • Osteoporosis Mother    • Heart disease Father    • Hypertension Father    • No Known Problems Sister    • Heart disease Brother    • Diabetes Brother    • No Known Problems Daughter    • No Known Problems Son    • No Known Problems Maternal Grandmother    • No Known Problems Maternal Grandfather    • No Known Problems Paternal Grandmother    • No Known Problems Paternal Grandfather    • Ulcerative colitis Family    • Colon polyps Family    • Breast cancer Neg Hx    • Colon cancer Neg Hx    • Ovarian cancer Neg Hx      Social History     Socioeconomic History   • Marital status: /Civil Union     Spouse name: None   • Number of children: None   • Years of education: None   • Highest education level: None   Occupational History   • Occupation: retired   Tobacco Use   • Smoking status: Never   • Smokeless tobacco: Never   Vaping Use   • Vaping Use: Never used   Substance and Sexual Activity   • Alcohol use:  Yes     Alcohol/week: 1 0 standard drink     Types: 1 Glasses of wine per week     Comment: occas   • Drug use: Never   • Sexual activity: Not Currently   Other Topics Concern   • None   Social History Narrative    Activities: golf    Caffeine use    Consumes healthy diet    Daily caffeinated coffee consumption: 1 cup per day    Drinks caffeinated tea: 1 cup per day    Well balanced diet     Social Determinants of Health     Financial Resource Strain: Not on file   Food Insecurity: Not on file   Transportation Needs: Not on file   Physical Activity: Not on file   Stress: Not on file   Social Connections: Not on file   Intimate Partner Violence: Not on file   Housing Stability: Not on file       Current Outpatient Medications:   •  acetaminophen (TYLENOL) 325 mg tablet, Take 2 tablets (650 mg total) by mouth every 6 (six) hours as needed for mild pain, Disp: , Rfl: 0  •  amiodarone 200 mg tablet, Take 0 5 tablets (100 mg total) by mouth daily with breakfast, Disp: 45 tablet, Rfl: 3  •  amLODIPine (NORVASC) 5 mg tablet, Take 1 tablet (5 mg total) by mouth daily, Disp: 90 tablet, Rfl: 3  •  atorvastatin (LIPITOR) 20 mg tablet, TAKE 1 TABLET BY MOUTH EVERY DAY WITH DINNER, Disp: 30 tablet, Rfl: 8  •  bumetanide (BUMEX) 2 mg tablet, TAKE 1 TABLET BY MOUTH EVERY DAY, Disp: 30 tablet, Rfl: 8  •  gabapentin (NEURONTIN) 100 mg capsule, Take 1 tablet at bedtime  May increase to 2 tablets after 3 days (Patient taking differently: 100 mg 2 (two) times a day Take 1 tablet at bedtime  May increase to 2 tablets after 3 days), Disp: 60 capsule, Rfl: 1  •  levETIRAcetam (KEPPRA) 500 mg tablet, Take 1 tablet (500 mg total) by mouth every 12 (twelve) hours, Disp: 60 tablet, Rfl: 3  •  lidocaine (Lidoderm) 5 %, Apply 1 patch topically daily Remove & Discard patch within 12 hours or as directed by MD, Disp: 15 patch, Rfl: 0  •  magnesium oxide (MAG-OX) 400 mg, Take 1 tablet (400 mg total) by mouth 2 (two) times a day before lunch and dinner, Disp: , Rfl: 0  •  methocarbamol (ROBAXIN) 500 mg tablet, Take 1 tablet (500 mg total) by mouth 3 (three) times a day, Disp: 30 tablet, Rfl: 1  •  metoprolol succinate (TOPROL-XL) 25 mg 24 hr tablet, TAKE 1 TABLET BY MOUTH EVERY DAY, Disp: 30 tablet, Rfl: 8  •  multivitamin (THERAGRAN) TABS, Take 1 tablet by mouth daily  , Disp: , Rfl:   •  nitroglycerin (NITROSTAT) 0 4 mg SL tablet, Place 1 tablet (0 4 mg total) under the tongue every 5 (five) minutes as needed for chest pain, Disp: 25 tablet, Rfl: 0  •  oxyCODONE (ROXICODONE) 5 immediate release tablet, , Disp: , Rfl:   •  potassium chloride (K-DUR,KLOR-CON) 20 mEq tablet, Take 2 tablets (40 mEq total) by mouth daily, Disp: 60 tablet, Rfl: 8  • prasugrel (EFFIENT) tablet, TAKE 1 TABLET BY MOUTH EVERY DAY, Disp: 30 tablet, Rfl: 11  •  sertraline (ZOLOFT) 25 mg tablet, TAKE 1 TABLET BY MOUTH EVERY DAY, Disp: 90 tablet, Rfl: 0  •  Xarelto 15 MG tablet, TAKE 1 TABLET (15 MG TOTAL) BY MOUTH DAILY WITH DINNER, Disp: 30 tablet, Rfl: 8    Review of Systems   Constitutional: Negative for appetite change, chills, fatigue, fever and unexpected weight change  HENT: Negative for congestion, hearing loss and postnasal drip  Eyes: Positive for redness  Injected Left eye   Respiratory: Negative for cough and shortness of breath  Cardiovascular: Positive for leg swelling  Skin: Positive for wound (RLE)  Negative for rash  Neurological: Negative for numbness  Hematological: Does not bruise/bleed easily  Psychiatric/Behavioral: Negative  Objective:  /63   Pulse 81   Temp (!) 96 3 °F (35 7 °C)   Resp 18   Pain Score: 0-No pain     Physical Exam  Vitals reviewed  Constitutional:       General: She is not in acute distress  Appearance: Normal appearance  She is well-developed and normal weight  HENT:      Head: Normocephalic and atraumatic  Eyes:      Extraocular Movements: Extraocular movements intact  Comments: Left eye erythema   Cardiovascular:      Rate and Rhythm: Normal rate  Pulmonary:      Effort: Pulmonary effort is normal    Musculoskeletal:         General: No deformity  Right lower leg: No edema  Left lower leg: No edema  Skin:     General: Skin is warm and dry  Findings: Wound (RLE) present  No erythema  Comments: Healed  See wound assessment  Neurological:      General: No focal deficit present  Mental Status: She is alert and oriented to person, place, and time  Gait: Gait normal    Psychiatric:         Mood and Affect: Mood and affect normal          Behavior: Behavior normal  Behavior is cooperative             Wound 11/01/22 Traumatic Pretibial Proximal;Right (Active) Wound Image Images linked 12/30/22 1320   Wound Description Epithelialization 12/30/22 1317   Wound Length (cm) 0 cm 12/30/22 1317   Wound Width (cm) 0 cm 12/30/22 1317   Wound Depth (cm) 0 cm 12/30/22 1317   Wound Surface Area (cm^2) 0 cm^2 12/30/22 1317   Wound Volume (cm^3) 0 cm^3 12/30/22 1317   Calculated Wound Volume (cm^3) 0 cm^3 12/30/22 1317   Change in Wound Size % 100 12/30/22 1317   Drainage Amount None 12/30/22 1317   Non-staged Wound Description Not applicable 14/76/28 3022   Dressing Status Intact 12/30/22 1317                Wound Instructions:  Orders Placed This Encounter   Procedures   • Wound cleansing and dressings     Right Leg    Healed , keep covered for a week to protect newly healed wound     Standing Status:   Future     Standing Expiration Date:   12/30/2023       Primitivo Sibley PA-C, [facility]      Portions of the record may have been created with voice recognition software  Occasional wrong word or "sound alike" substitutions may have occurred due to the inherent limitations of voice recognition software  Read the chart carefully and recognize, using context, where substitutions have occurred

## 2022-12-30 NOTE — PATIENT INSTRUCTIONS
Orders Placed This Encounter   Procedures    Wound cleansing and dressings     Right Leg    Healed , keep covered for a week to protect newly healed wound     Standing Status:   Future     Standing Expiration Date:   12/30/2023

## 2023-01-10 DIAGNOSIS — S29.9XXA RIB INJURY: ICD-10-CM

## 2023-01-10 RX ORDER — LIDOCAINE 50 MG/G
1 PATCH TOPICAL DAILY
Qty: 15 PATCH | Refills: 0 | Status: SHIPPED | OUTPATIENT
Start: 2023-01-10 | End: 2023-01-19

## 2023-01-13 ENCOUNTER — RA CDI HCC (OUTPATIENT)
Dept: OTHER | Facility: HOSPITAL | Age: 83
End: 2023-01-13

## 2023-01-13 NOTE — PROGRESS NOTES
[facility]  coding opportunities        I13 0, I73 9 and I49 5  Chart Reviewed number of suggestions sent to Provider: 3     Patients Insurance     Medicare Insurance: Medicare

## 2023-01-19 ENCOUNTER — OFFICE VISIT (OUTPATIENT)
Dept: FAMILY MEDICINE CLINIC | Facility: MEDICAL CENTER | Age: 83
End: 2023-01-19

## 2023-01-19 VITALS
BODY MASS INDEX: 27.68 KG/M2 | OXYGEN SATURATION: 97 % | HEART RATE: 84 BPM | WEIGHT: 141 LBS | SYSTOLIC BLOOD PRESSURE: 146 MMHG | HEIGHT: 60 IN | DIASTOLIC BLOOD PRESSURE: 76 MMHG

## 2023-01-19 DIAGNOSIS — I50.42 CHRONIC COMBINED SYSTOLIC AND DIASTOLIC CONGESTIVE HEART FAILURE (HCC): ICD-10-CM

## 2023-01-19 DIAGNOSIS — N18.30 STAGE 3 CHRONIC KIDNEY DISEASE, UNSPECIFIED WHETHER STAGE 3A OR 3B CKD (HCC): ICD-10-CM

## 2023-01-19 DIAGNOSIS — I65.23 CAROTID STENOSIS, ASYMPTOMATIC, BILATERAL: ICD-10-CM

## 2023-01-19 DIAGNOSIS — Z09 FOLLOW-UP EXAM, 3-6 MONTHS SINCE PREVIOUS EXAM: Primary | ICD-10-CM

## 2023-01-19 DIAGNOSIS — Z00.00 HEALTHCARE MAINTENANCE: ICD-10-CM

## 2023-01-19 DIAGNOSIS — I73.9 PAD (PERIPHERAL ARTERY DISEASE) (HCC): ICD-10-CM

## 2023-01-19 DIAGNOSIS — R56.9 SEIZURE-LIKE ACTIVITY (HCC): ICD-10-CM

## 2023-01-19 DIAGNOSIS — I25.810 CORONARY ARTERY DISEASE INVOLVING OTHER CORONARY ARTERY BYPASS GRAFT WITHOUT ANGINA PECTORIS: ICD-10-CM

## 2023-01-19 DIAGNOSIS — R94.01 ABNORMAL EEG: ICD-10-CM

## 2023-01-19 DIAGNOSIS — F32.A DEPRESSION, UNSPECIFIED DEPRESSION TYPE: ICD-10-CM

## 2023-01-19 DIAGNOSIS — E78.5 HYPERLIPIDEMIA, UNSPECIFIED HYPERLIPIDEMIA TYPE: ICD-10-CM

## 2023-01-19 DIAGNOSIS — I48.91 ATRIAL FIBRILLATION, UNSPECIFIED TYPE (HCC): ICD-10-CM

## 2023-01-19 NOTE — PROGRESS NOTES
[address] [facility] - Clinic Note  Brittany Chambers, [place], 23     Shanice Lindsay MRN: [id number] : 1940 Age: 79 y o  Assessment/Plan     1  Follow-up exam, 3-6 months since previous exam    -Patient presents in follow-up today, she will return in 6 months and sooner as needed    2  Atrial fibrillation, unspecified type ([place] [address] )    -Following with cardiology  -Rhythm and rate controlled with amiodarone and blocker  -Anticoagulated with Xarelto    3  Hyperlipidemia, unspecified hyperlipidemia type    - Lipid Panel with Direct LDL reflex; Future  -Continue Lipitor 20 mg p o  nightly    4  Coronary artery disease involving other coronary artery bypass graft without angina pectoris    -Following with cardiology  -Hypertension stable with current management  -ContinueToprol-XL 25 mg p o  daily  -Continue Prasugrel and statin     5  Chronic combined systolic and diastolic congestive heart failure (HCC)    -Euvolemic  -Following with cardiology  -Bumex 2 mg p o  daily    6  Abnormal EEG    - Ambulatory Referral to Neurology; Future    7  Seizure-like activity ([place] [address] )    - Ambulatory Referral to Neurology; Future    8  Carotid stenosis, asymptomatic, bilateral    -Following with Vascular Surgery    9  PAD (peripheral artery disease) ([place] [address] )    -Following with Vascular Surgery    10  Stage 3 chronic kidney disease, unspecified whether stage 3a or 3b CKD ([place] [address] )    - Basic metabolic panel; Future    11  Depression, unspecified depression type    -Currently stable with Zoloft    12   Healthcare maintenance    -Declines screening mammogram, advised patient about signs and symptoms in which case she should contact at which point we would pursue imaging as appropriate  -Advised about COVID-19 bivalent vaccine   -Reviewed results of DXA scan with patient, advised about vitamin D supplementation, patient states that she stopped calcium sedation in the past due to history of nephrolithiasis, declines other pharmacotherapy Cristhian Avelar acknowledged understanding of treatment plan, all questions answered  Subjective      Cristhian Avelar is a 79 y o  female who presents for 6 follow-up  Patient presents with her  who contributes to history  Patient's  does bring in patient's medication list today for medication reconciliation  The following portions of the patient's history were reviewed and updated as appropriate: allergies, current medications, past family history, past medical history, past social history, past surgical history and problem list      Past Medical History:   Diagnosis Date   • Anal fissure    • Cardiac disorder    • Cognitive changes 12/23/2020   • Esophageal reflux    • Esophagitis, reflux    • Hemorrhoids    • Hepatic hemangioma     Last Assessed: 1/13/2015   • Herpes zoster    • History of colonic polyps    • Hypertension    • Ischemic colitis ([facility] )    • Lumbar herniated disc    • Malignant neoplasm without specification of site [facility])    • Nephrolithiasis     L   Lithotripsy   • Nontoxic single thyroid nodule     Last Assessed: 1/13/2015   • Osteoarthritis    • Overactive bladder    • Raynaud disease    • Respiratory system disease    • Sjogren's disease ([facility] )    • Spinal stenosis    • PONCHO (stress urinary incontinence, female)    • Uterovaginal prolapse     Grade I-II       Allergies   Allergen Reactions   • Sulfa Antibiotics Anaphylaxis   • Formaldehyde    • Codeine Palpitations       Past Surgical History:   Procedure Laterality Date   • APPENDECTOMY  1947   • CARDIAC PACEMAKER PLACEMENT  01/2021   • CARDIAC SURGERY      CABG   • CATARACT EXTRACTION Bilateral    • COLONOSCOPY  2012    Fiberoptic   • COLONOSCOPY      Resolved: 2006 - 2012 5 year f/u   • CORONARY ANGIOPLASTY WITH STENT PLACEMENT     • CORONARY ARTERY BYPASS GRAFT      Resolved: 2012   • ESOPHAGOGASTRODUODENOSCOPY  2012    Diagnostic   • HEMORROIDECTOMY     • KNEE SURGERY     • LITHOTRIPSY      Renal   • MALIGNANT SKIN LESION EXCISION      Face; Resolved: 2004   • CO ESOPHAGOGASTRODUODENOSCOPY TRANSORAL DIAGNOSTIC N/A 4/13/2016    Procedure: EGD AND COLONOSCOPY;  Surgeon: Kristi Rae MD;  [facility]; Service: Gastroenterology   • RENAL ARTERY STENT     • SKIN LESION EXCISION      Scalp   • SOFT TISSUE TUMOR RESECTION      Shoulder; Resolved: 1995   • THROMBOLYSIS      Postoperative Thrombolysis PTCA   • TONSILLECTOMY      Resolved: 1944       Family History   Problem Relation Age of Onset   • Heart disease Mother    • Hypertension Mother    • Osteoporosis Mother    • Heart disease Father    • Hypertension Father    • No Known Problems Sister    • Heart disease Brother    • Diabetes Brother    • No Known Problems Daughter    • No Known Problems Son    • No Known Problems Maternal Grandmother    • No Known Problems Maternal Grandfather    • No Known Problems Paternal Grandmother    • No Known Problems Paternal Grandfather    • Ulcerative colitis Family    • Colon polyps Family    • Breast cancer Neg Hx    • Colon cancer Neg Hx    • Ovarian cancer Neg Hx        Social History     Socioeconomic History   • Marital status: /Civil Union     Spouse name: None   • Number of children: None   • Years of education: None   • Highest education level: None   Occupational History   • Occupation: retired   Tobacco Use   • Smoking status: Never   • Smokeless tobacco: Never   Vaping Use   • Vaping Use: Never used   Substance and Sexual Activity   • Alcohol use:  Yes     Alcohol/week: 1 0 standard drink     Types: 1 Glasses of wine per week     Comment: occas   • Drug use: Never   • Sexual activity: Not Currently   Other Topics Concern   • None   Social History Narrative    Activities: golf    Caffeine use    Consumes healthy diet    Daily caffeinated coffee consumption: 1 cup per day    Drinks caffeinated tea: 1 cup per day    Well balanced diet     Social Determinants of Health     Financial Resource Strain: Not on file   Food Insecurity: Not on file   Transportation Needs: Not on file   Physical Activity: Not on file   Stress: Not on file   Social Connections: Not on file   Intimate Partner Violence: Not on file   Housing Stability: Not on file       Current Outpatient Medications   Medication Sig Dispense Refill   • amiodarone 200 mg tablet Take 0 5 tablets (100 mg total) by mouth daily with breakfast 45 tablet 3   • atorvastatin (LIPITOR) 20 mg tablet TAKE 1 TABLET BY MOUTH EVERY DAY WITH DINNER 30 tablet 8   • bumetanide (BUMEX) 2 mg tablet TAKE 1 TABLET BY MOUTH EVERY DAY 30 tablet 8   • metoprolol succinate (TOPROL-XL) 25 mg 24 hr tablet TAKE 1 TABLET BY MOUTH EVERY DAY 30 tablet 8   • multivitamin (THERAGRAN) TABS Take 1 tablet by mouth daily  • nitroglycerin (NITROSTAT) 0 4 mg SL tablet Place 1 tablet (0 4 mg total) under the tongue every 5 (five) minutes as needed for chest pain 25 tablet 0   • prasugrel (EFFIENT) tablet TAKE 1 TABLET BY MOUTH EVERY DAY 30 tablet 11   • sertraline (ZOLOFT) 25 mg tablet TAKE 1 TABLET BY MOUTH EVERY DAY 90 tablet 0   • Xarelto 15 MG tablet TAKE 1 TABLET (15 MG TOTAL) BY MOUTH DAILY WITH DINNER 30 tablet 8   • levETIRAcetam (KEPPRA) 500 mg tablet Take 1 tablet (500 mg total) by mouth every 12 (twelve) hours 60 tablet 3     No current facility-administered medications for this visit  Review of Systems     As noted in HPI    Objective      /76 (BP Location: Left arm, Patient Position: Sitting, Cuff Size: Adult)   Pulse 84   Ht 5' (1 524 m)   Wt 64 kg (141 lb)   SpO2 97%   BMI 27 54 kg/m²     Physical Exam  Vitals reviewed  Constitutional:       General: She is not in acute distress  Appearance: Normal appearance  HENT:      Head: Normocephalic and atraumatic  Mouth/Throat:      Mouth: Mucous membranes are moist       Pharynx: Oropharynx is clear     Eyes:      Conjunctiva/sclera: Conjunctivae normal    Cardiovascular:      Rate and Rhythm: Normal rate and regular rhythm  Pulses: Normal pulses  Heart sounds: Normal heart sounds  Pulmonary:      Effort: Pulmonary effort is normal       Breath sounds: Normal breath sounds  Abdominal:      General: Abdomen is flat  Bowel sounds are normal       Palpations: Abdomen is soft  Tenderness: There is no abdominal tenderness  Musculoskeletal:      Cervical back: Neck supple  Right lower leg: Right lower leg edema: trace  Left lower leg: Left lower leg edema: trace  Skin:     General: Skin is warm and dry  Neurological:      Mental Status: She is alert and oriented to person, place, and time  Psychiatric:         Mood and Affect: Mood normal          Thought Content: Thought content normal              Some portions of this record may have been generated with voice recognition software  There may be translation, syntax, or grammatical errors  Occasional wrong word or "sound-a-like" substitutions may have occurred due to the inherent limitations of the voice recognition software  Read the chart carefully and recognize, using context, where substations may have occurred  If you have any questions, please contact the dictating provider for clarification or correction, as needed

## 2023-01-31 ENCOUNTER — REMOTE DEVICE CLINIC VISIT (OUTPATIENT)
Dept: CARDIOLOGY CLINIC | Facility: CLINIC | Age: 83
End: 2023-01-31

## 2023-01-31 DIAGNOSIS — Z95.0 PRESENCE OF CARDIAC PACEMAKER: Primary | ICD-10-CM

## 2023-01-31 NOTE — PROGRESS NOTES
Results for orders placed or performed in visit on 01/31/23   Cardiac EP device report    Narrative    MDT-DUAL CHAMBER PPM (DDDR MODE)/ACTIVE SYSTEM IS MRI CONDITIONAL  CARELINK TRANSMISSION: BATTERY VOLTAGE ADEQUATE (8 6 YRS)  AP: 93 3%  : 99 9% (>40%~MP-OFF~DDDR/70)  ALL AVAILABLE LEAD PARAMETERS WITHIN NORMAL LIMITS  NO SIGNIFICANT HIGH RATE EPISODES  PACEMAKER FUNCTIONING APPROPRIATELY    [address] [place] [address]

## 2023-02-11 DIAGNOSIS — I48.91 A-FIB (HCC): ICD-10-CM

## 2023-02-13 RX ORDER — METOPROLOL SUCCINATE 25 MG/1
TABLET, EXTENDED RELEASE ORAL
Qty: 30 TABLET | Refills: 8 | Status: SHIPPED | OUTPATIENT
Start: 2023-02-13

## 2023-02-15 NOTE — PROGRESS NOTES
Cardiology Follow Up    Joann Gan  1940  [id number]  [address] [facility], [place] [address] [phone]    1  Essential hypertension  Hepatic function panel    LDL cholesterol, direct    Lipid panel    TSH, 3rd generation with Free T4 reflex      2  Atrial fibrillation, unspecified type ([address] )  Hepatic function panel    LDL cholesterol, direct    Lipid panel    TSH, 3rd generation with Free T4 reflex      3  Hx of CABG  Hepatic function panel    LDL cholesterol, direct    Lipid panel    TSH, 3rd generation with Free T4 reflex      4  Presence of permanent cardiac pacemaker  Hepatic function panel    LDL cholesterol, direct    Lipid panel    TSH, 3rd generation with Free T4 reflex          Interval History: Patient is here for a follow-up visit  She had CABG x 4 July 2011 with closure of the LIMA to the LAD documented thereafter and revascularization of the LAD and the diagonal branch with PCI  Cardiac catheterization done January 2021 culminated in PCI in the proximal LAD with placement of a LAMINE and PCI of the ostial Lmain with placement of a LAMINE  SVG's to the first diagonal and first OMB were closed  Graft to the distal RCA had 50% stenosis  Dual chamber PPM with deep septal lead to preserve narrow QRS was placed January 2021  Interrogation done in 1/2023 demonstrated an appropriately functioning [employer] device  Patient followed by EP and seen April 2022  Remains on low-dose amiodarone related to PAF  Echocardiogram January 2021 demonstrated LVEF of 50% with mild to moderate MR  There was no significant change compared to a prior study done December 2020  She is here today with her   Her vital signs are stable today  She has had no chest pain or significant dyspnea      Patient Active Problem List   Diagnosis   • Essential hypertension   • 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)   • A-fib (HCC)   • 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HCC)   • PAD (peripheral artery disease) (HCC)   • Abnormal liver enzymes   • Edema of left upper extremity   • Urinary retention   • Seizure-like activity (HCC)   • Hyperlipidemia   • Toxic metabolic encephalopathy   • Depression   • Current use of long term anticoagulation   • Long term current use of amiodarone   • Renal artery stenosis (HCC)   • Pulmonary hypertension (HCC)   • Sick sinus syndrome (HCC)   • Hx of CABG   • CAD (coronary artery disease)   • Memory loss   • Mild cognitive impairment   • Atherosclerosis with claudication of extremity (HCC)   • Carotid stenosis, asymptomatic, bilateral   • Acute (reversible) ischemia of small intestine, extent unspecified (HCC)   • Cervical disc disorder with radiculopathy of mid-cervical region   • Cervical spondylosis   • Fall   • Wound of right leg     Past Medical History:   Diagnosis Date   • Anal fissure    • Cardiac disorder    • Cognitive changes 12/23/2020   • Esophageal reflux    • Esophagitis, reflux    • Hemorrhoids    • Hepatic hemangioma     Last Assessed: 1/13/2015   • Herpes zoster    • History of colonic polyps    • Hypertension    • Ischemic colitis ([facility] )    • Lumbar herniated disc    • Malignant neoplasm without specification of site [facility])    • Nephrolithiasis     L   Lithotripsy   • Nontoxic single thyroid nodule     Last Assessed: 1/13/2015   • Osteoarthritis    • Overactive bladder    • Raynaud disease    • Respiratory system disease    • Sjogren's disease ([facility] )    • Spinal stenosis    • PONCHO (stress urinary incontinence, female)    • Uterovaginal prolapse     Grade I-II     Social History     Socioeconomic History   • Marital status: /Civil Union     Spouse name: Not on file   • Number of children: Not on file   • Years of education: Not on file   • Highest education level: Not on file   Occupational History   • Occupation: retired   Tobacco Use   • Smoking status: Never   • Smokeless tobacco: Never   Vaping Use   • Vaping Use: Never used   Substance and Sexual Activity   • Alcohol use:  Yes     Alcohol/week: 1 0 standard drink     Types: 1 Glasses of wine per week     Comment: occas   • Drug use: Never   • Sexual activity: Not Currently   Other Topics Concern   • Not on file   Social History Narrative    Activities: golf    Caffeine use    Consumes healthy diet    Daily caffeinated coffee consumption: 1 cup per day    Drinks caffeinated tea: 1 cup per day    Well balanced diet     Social Determinants of Health     Financial Resource Strain: Not on file   Food Insecurity: Not on file   Transportation Needs: Not on file   Physical Activity: Not on file   Stress: Not on file   Social Connections: Not on file   Intimate Partner Violence: Not on file   Housing Stability: Not on file      Family History   Problem Relation Age of Onset   • Heart disease Mother    • Hypertension Mother    • Osteoporosis Mother    • Heart disease Father    • Hypertension Father    • No Known Problems Sister    • Heart disease Brother    • Diabetes Brother    • No Known Problems Daughter    • No Known Problems Son    • No Known Problems Maternal Grandmother    • No Known Problems Maternal Grandfather    • No Known Problems Paternal Grandmother    • No Known Problems Paternal Grandfather    • Ulcerative colitis Family    • Colon polyps Family    • Breast cancer Neg Hx    • Colon cancer Neg Hx    • Ovarian cancer Neg Hx      Past Surgical History:   Procedure Laterality Date   • APPENDECTOMY  1947   • CARDIAC PACEMAKER PLACEMENT  01/2021   • CARDIAC SURGERY      CABG   • CATARACT EXTRACTION Bilateral    • COLONOSCOPY  2012    Fiberoptic   • COLONOSCOPY      Resolved: 2006 - 2012 5 year f/u   • CORONARY ANGIOPLASTY WITH STENT PLACEMENT     • CORONARY ARTERY BYPASS GRAFT      Resolved: 2012   • ESOPHAGOGASTRODUODENOSCOPY  2012    Diagnostic   • HEMORROIDECTOMY     • KNEE SURGERY     • LITHOTRIPSY      Renal   • MALIGNANT SKIN LESION EXCISION      Face; Resolved: 2004   • NM ESOPHAGOGASTRODUODENOSCOPY TRANSORAL DIAGNOSTIC N/A 4/13/2016    Procedure: EGD AND COLONOSCOPY;  Surgeon: Rolando Edwards MD;  [facility]; Service: Gastroenterology   • RENAL ARTERY STENT     • SKIN LESION EXCISION      Scalp   • SOFT TISSUE TUMOR RESECTION      Shoulder; Resolved: 1995   • THROMBOLYSIS      Postoperative Thrombolysis PTCA   • TONSILLECTOMY      Resolved: 1944       Current Outpatient Medications:   •  amiodarone 200 mg tablet, Take 0 5 tablets (100 mg total) by mouth daily with breakfast, Disp: 45 tablet, Rfl: 3  •  atorvastatin (LIPITOR) 20 mg tablet, TAKE 1 TABLET BY MOUTH EVERY DAY WITH DINNER, Disp: 90 tablet, Rfl: 4  •  bumetanide (BUMEX) 2 mg tablet, TAKE 1 TABLET BY MOUTH EVERY DAY, Disp: 30 tablet, Rfl: 8  •  metoprolol succinate (TOPROL-XL) 25 mg 24 hr tablet, TAKE 1 TABLET BY MOUTH EVERY DAY, Disp: 30 tablet, Rfl: 8  •  multivitamin (THERAGRAN) TABS, Take 1 tablet by mouth daily  , Disp: , Rfl:   •  nitroglycerin (NITROSTAT) 0 4 mg SL tablet, Place 1 tablet (0 4 mg total) under the tongue every 5 (five) minutes as needed for chest pain, Disp: 25 tablet, Rfl: 0  •  prasugrel (EFFIENT) tablet, TAKE 1 TABLET BY MOUTH EVERY DAY, Disp: 30 tablet, Rfl: 11  •  sertraline (ZOLOFT) 25 mg tablet, TAKE 1 TABLET BY MOUTH EVERY DAY, Disp: 90 tablet, Rfl: 0  •  Xarelto 15 MG tablet, TAKE 1 TABLET (15 MG TOTAL) BY MOUTH DAILY WITH DINNER, Disp: 30 tablet, Rfl: 8  •  levETIRAcetam (KEPPRA) 500 mg tablet, Take 1 tablet (500 mg total) by mouth every 12 (twelve) hours (Patient not taking: Reported on 2/24/2023), Disp: 60 tablet, Rfl: 3  Allergies   Allergen Reactions   • Sulfa Antibiotics Anaphylaxis   • Formaldehyde    • Codeine Palpitations       Labs:not applicable  Imaging: Cardiac EP device report    Result Date: 1/31/2023  Narrative: MDT-DUAL CHAMBER PPM (DDDR MODE)/ACTIVE SYSTEM IS MRI CONDITIONAL CARELINK TRANSMISSION: BATTERY VOLTAGE ADEQUATE (8 6 YRS)  AP: 93 3%  : 99 9% (>40%~MP-OFF~DDDR/70)  ALL AVAILABLE LEAD PARAMETERS WITHIN NORMAL LIMITS  NO SIGNIFICANT HIGH RATE EPISODES  PACEMAKER FUNCTIONING APPROPRIATELY  [address] [place] [address]       Review of Systems:  Review of Systems   All other systems reviewed and are negative  Physical Exam:  /70 (BP Location: Left arm, Patient Position: Sitting, Cuff Size: Standard)   Pulse 76   Wt 63 5 kg (140 lb)   SpO2 96%   BMI 27 34 kg/m²   Physical Exam  Vitals reviewed  Constitutional:       Appearance: She is well-developed  HENT:      Head: Normocephalic and atraumatic  Eyes:      Conjunctiva/sclera: Conjunctivae normal       Pupils: Pupils are equal, round, and reactive to light  Cardiovascular:      Rate and Rhythm: Normal rate  Heart sounds: Normal heart sounds  Pulmonary:      Effort: Pulmonary effort is normal       Breath sounds: Normal breath sounds  Musculoskeletal:      Cervical back: Normal range of motion and neck supple  Skin:     General: Skin is warm and dry  Neurological:      Mental Status: She is alert and oriented to person, place, and time  Discussion/Summary:I will continue the patient's present medical regimen  The patient appears well compensated  I have asked the patient to call if there is a problem in the interim otherwise I will see the patient in six months time

## 2023-02-24 ENCOUNTER — OFFICE VISIT (OUTPATIENT)
Dept: CARDIOLOGY CLINIC | Facility: CLINIC | Age: 83
End: 2023-02-24

## 2023-02-24 VITALS
SYSTOLIC BLOOD PRESSURE: 142 MMHG | BODY MASS INDEX: 27.34 KG/M2 | DIASTOLIC BLOOD PRESSURE: 70 MMHG | OXYGEN SATURATION: 96 % | WEIGHT: 140 LBS | HEART RATE: 76 BPM

## 2023-02-24 DIAGNOSIS — Z95.1 HX OF CABG: ICD-10-CM

## 2023-02-24 DIAGNOSIS — I48.91 ATRIAL FIBRILLATION, UNSPECIFIED TYPE (HCC): ICD-10-CM

## 2023-02-24 DIAGNOSIS — I10 ESSENTIAL HYPERTENSION: Primary | ICD-10-CM

## 2023-02-24 DIAGNOSIS — Z95.0 PRESENCE OF PERMANENT CARDIAC PACEMAKER: ICD-10-CM

## 2023-02-24 NOTE — PATIENT INSTRUCTIONS
I will continue the patient's present medical regimen  The patient appears well compensated  I have asked the patient to call if there is a problem in the interim otherwise I will see the patient in six months time  Please have blood work done at your convenience

## 2023-03-25 DIAGNOSIS — I48.91 A-FIB (HCC): ICD-10-CM

## 2023-03-26 DIAGNOSIS — I48.91 A-FIB (HCC): ICD-10-CM

## 2023-03-27 RX ORDER — RIVAROXABAN 15 MG/1
TABLET, FILM COATED ORAL
Qty: 30 TABLET | Refills: 8 | Status: SHIPPED | OUTPATIENT
Start: 2023-03-27

## 2023-03-27 RX ORDER — AMIODARONE HYDROCHLORIDE 200 MG/1
TABLET ORAL
Qty: 15 TABLET | Refills: 8 | Status: SHIPPED | OUTPATIENT
Start: 2023-03-27

## 2023-04-25 ENCOUNTER — IN-CLINIC DEVICE VISIT (OUTPATIENT)
Dept: CARDIOLOGY CLINIC | Facility: MEDICAL CENTER | Age: 83
End: 2023-04-25

## 2023-04-25 DIAGNOSIS — Z95.0 CARDIAC PACEMAKER IN SITU: Primary | ICD-10-CM

## 2023-04-25 NOTE — PROGRESS NOTES
Results for orders placed or performed in visit on 04/25/23   Cardiac EP device report    Narrative    MDT-DUAL CHAMBER PPM (DDDR MODE)/ACTIVE SYSTEM IS MRI CONDITIONAL  DEVICE INTERROGATED IN THE [place] OFFICE: BATTERY VOLTAGE ADEQUATE-8 YRS  AP 94%  100%  ALL AVAILABLE LEAD PARAMETERS WITHIN NORMAL LIMITS  NO SIGNIFICANT HIGH RATE EPISODES  NO PROGRAMMING CHANGES MADE TO DEVICE PARAMETERS  NORMAL DEVICE FUNCTION   NC

## 2023-05-01 DIAGNOSIS — I25.10 CAD (CORONARY ARTERY DISEASE): ICD-10-CM

## 2023-05-01 RX ORDER — PRASUGREL 10 MG/1
TABLET, FILM COATED ORAL
Qty: 30 TABLET | Refills: 11 | Status: SHIPPED | OUTPATIENT
Start: 2023-05-01

## 2023-06-06 ENCOUNTER — TELEPHONE (OUTPATIENT)
Dept: FAMILY MEDICINE CLINIC | Facility: MEDICAL CENTER | Age: 83
End: 2023-06-06

## 2023-06-06 NOTE — TELEPHONE ENCOUNTER
Per Daniel Garland, called pt to find out if she ever had the CT lung done  Pt was not aware she needed this imaging and wasn't sure she needed it now  I told her it was a follow up to her x-ray and pt agreed to schedule CT  I gave her the phone number to call

## 2023-06-10 NOTE — PROGRESS NOTES
Assessment and Plan:     Problem List Items Addressed This Visit    None     Visit Diagnoses     Medicare annual wellness visit, subsequent    -  Primary    Screen for colon cancer        Relevant Orders    Occult Blood, Fecal Immunochemical (FIT)    Screening for diabetes mellitus        Relevant Orders    Glucose, fasting    Asymptomatic postmenopausal state        Relevant Orders    DXA bone density spine hip and pelvis    Need for hepatitis C screening test        Relevant Orders    Hepatitis C antibody    Screening for AAA (abdominal aortic aneurysm)        Relevant Orders    US abdominal aorta screening aaa    Family history of abdominal aortic aneurysm (AAA)        Relevant Orders    US abdominal aorta screening aaa    BMI 29 0-29 9,adult        Stage 3 chronic kidney disease, unspecified whether stage 3a or 3b CKD ([facility] )        Relevant Orders    Ambulatory Referral to Nephrology        BMI Counseling: Body mass index is 29 15 kg/m²  The BMI is above normal  Nutrition recommendations include encouraging healthy choices of fruits and vegetables  Exercise recommendations include strength training exercises  Rationale for BMI follow-up plan is due to patient being overweight or obese  Falls Plan of Care: balance, strength, and gait training instructions were provided  Home safety education provided  Preventive health issues were discussed with patient, and age appropriate screening tests were ordered as noted in patient's After Visit Summary  Personalized health advice and appropriate referrals for health education or preventive services given if needed, as noted in patient's After Visit Summary       History of Present Illness:     Patient presents for a Medicare Wellness Visit    Patient Care Team:  Otilia Negrete, DO as PCP - General (Family Medicine)  MD Derek Larios MD Amada Burrs, [address], MD Kathyanne Cordoba, MD Marta Furl, MD Vallie Hagedorn Patient presents to the ED ambulatory with complaints of high blood pressure. Patient has been having ongoing issues with high blood pressure. Patient reports 190s over 100s. Patient reports feeling a headache   MD Janneth Giles MD Carrie Contras, MD as Endoscopist       Problem List:     Patient Active Problem List   Diagnosis    Essential hypertension    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[facility] )    A-fib ([facility] )    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[facility] )    PAD (peripheral artery disease) ([facility] )    Abnormal liver enzymes    Edema of left upper extremity    Urinary retention    Seizure-like activity ([facility] )    Hyperlipidemia    Toxic metabolic encephalopathy    UTI (urinary tract infection)    Depression    Current use of long term anticoagulation    Long term current use of amiodarone    Renal artery stenosis ([facility] )    Pulmonary hypertension ([facility] )    Sick sinus syndrome ([facility] )    Hx of CABG    CAD (coronary artery disease)    Memory loss    Mild cognitive impairment    Atherosclerosis with claudication of extremity ([facility] )    Carotid stenosis, asymptomatic, bilateral    Acute (reversible) ischemia of small intestine, extent unspecified ([facility] )    Cervical disc disorder with radiculopathy of mid-cervical region      Past Medical and Surgical History:     Past Medical History:   Diagnosis Date    Anal fissure     Cardiac disorder     Cognitive changes 12/23/2020    Esophageal reflux     Esophagitis, reflux     Hemorrhoids     Hepatic hemangioma     Last Assessed: 1/13/2015    Herpes zoster     History of colonic polyps     Hypertension     Ischemic colitis ([facility] )     Lumbar herniated disc     Malignant neoplasm without specification of site ([facility] )     Nephrolithiasis     L   Lithotripsy    Nontoxic single thyroid nodule     Last Assessed: 1/13/2015    Osteoarthritis     Overactive bladder     Raynaud disease     Respiratory system disease     Sjogren's disease ([facility]     Spinal stenosis     PONCHO (stress urinary incontinence, female)     Uterovaginal prolapse     Grade I-II     Past Surgical History:   Procedure Laterality Date    APPENDECTOMY  1947    CARDIAC PACEMAKER PLACEMENT  01/2021    CARDIAC SURGERY      CABG    CATARACT EXTRACTION Bilateral     COLONOSCOPY  2012    Fiberoptic    COLONOSCOPY      Resolved: 2006 - 2012 5 year f/u    CORONARY ANGIOPLASTY WITH STENT PLACEMENT      CORONARY ARTERY BYPASS GRAFT      Resolved: 2012    ESOPHAGOGASTRODUODENOSCOPY  2012    Diagnostic    HEMORROIDECTOMY      KNEE SURGERY      LITHOTRIPSY      Renal    MALIGNANT SKIN LESION EXCISION      Face; Resolved: 2004    [place] ESOPHAGOGASTRODUODENOSCOPY TRANSORAL DIAGNOSTIC N/A 4/13/2016    Procedure: EGD AND COLONOSCOPY;  Surgeon: Gina Mcnamara MD;  [facility];   Service: Gastroenterology    RENAL ARTERY STENT      SKIN LESION EXCISION      Scalp    SOFT TISSUE TUMOR RESECTION      Shoulder; Resolved: 1995    THROMBOLYSIS      Postoperative Thrombolysis PTCA    TONSILLECTOMY      Resolved: 1944      Family History:     Family History   Problem Relation Age of Onset    Heart disease Mother     Hypertension Mother     Osteoporosis Mother     Heart disease Father     Hypertension Father     Ulcerative colitis Family     Colon polyps Family     No Known Problems Sister     Heart disease Brother     Diabetes Brother     No Known Problems Maternal Grandmother     No Known Problems Maternal Grandfather     No Known Problems Paternal Grandmother     No Known Problems Paternal Grandfather     No Known Problems Son     No Known Problems Daughter       Social History:     Social History     Socioeconomic History    Marital status: /Civil Union     Spouse name: None    Number of children: None    Years of education: None    Highest education level: None   Occupational History    Occupation: retired   Tobacco Use    Smoking status: Never Smoker    Smokeless tobacco: Never Used   Vaping Use    Vaping Use: Never used   Substance and Sexual Activity    Alcohol use: Not Currently     Alcohol/week: 1 0 standard drink     Types: 1 Glasses of wine per week  Drug use: Never    Sexual activity: Not Currently   Other Topics Concern    None   Social History Narrative    Activities: golf    Caffeine use    Consumes healthy diet    Daily caffeinated coffee consumption: 1 cup per day    Drinks caffeinated tea: 1 cup per day    Well balanced diet     Social Determinants of Health     Financial Resource Strain: Not on file   Food Insecurity: Not on file   Transportation Needs: Not on file   Physical Activity: Not on file   Stress: Not on file   Social Connections: Not on file   Intimate Partner Violence: Not on file   Housing Stability: Not on file      Medications and Allergies:     Current Outpatient Medications   Medication Sig Dispense Refill    acetaminophen (TYLENOL) 325 mg tablet Take 2 tablets (650 mg total) by mouth every 6 (six) hours as needed for mild pain  0    amiodarone 200 mg tablet Take 0 5 tablets (100 mg total) by mouth daily with breakfast 45 tablet 3    amLODIPine (NORVASC) 5 mg tablet Take 1 tablet (5 mg total) by mouth daily 90 tablet 3    atorvastatin (LIPITOR) 20 mg tablet TAKE 1 TABLET BY MOUTH EVERY DAY WITH DINNER 30 tablet 8    bumetanide (BUMEX) 2 mg tablet TAKE 1 TABLET BY MOUTH EVERY DAY 30 tablet 8    magnesium oxide (MAG-OX) 400 mg Take 1 tablet (400 mg total) by mouth 2 (two) times a day before lunch and dinner  0    metoprolol succinate (TOPROL-XL) 25 mg 24 hr tablet TAKE 1 TABLET BY MOUTH EVERY DAY 30 tablet 8    multivitamin (THERAGRAN) TABS Take 1 tablet by mouth daily        nitroglycerin (NITROSTAT) 0 4 mg SL tablet Place 1 tablet (0 4 mg total) under the tongue every 5 (five) minutes as needed for chest pain 25 tablet 0    prasugrel (EFFIENT) tablet TAKE 1 TABLET BY MOUTH EVERY DAY 30 tablet 11    sertraline (ZOLOFT) 25 mg tablet TAKE 1 TABLET BY MOUTH EVERY DAY 30 tablet 2    Xarelto 15 MG tablet TAKE 1 TABLET (15 MG TOTAL) BY MOUTH DAILY WITH DINNER 30 tablet 8    gabapentin (NEURONTIN) 100 mg capsule Take 1 tablet at bedtime  May increase to 2 tablets after 3 days (Patient taking differently: Take 1 tablet at bedtime  May increase to 2 tablets after 3 days) 60 capsule 1    levETIRAcetam (KEPPRA) 500 mg tablet Take 1 tablet (500 mg total) by mouth every 12 (twelve) hours 60 tablet 3    methocarbamol (ROBAXIN) 500 mg tablet Take 1 tablet (500 mg total) by mouth 3 (three) times a day 30 tablet 1    potassium chloride (K-DUR,KLOR-CON) 20 mEq tablet Take 2 tablets (40 mEq total) by mouth daily 60 tablet 8     No current facility-administered medications for this visit  Allergies   Allergen Reactions    Sulfa Antibiotics Anaphylaxis    Formaldehyde     Codeine Palpitations      Immunizations:     Immunization History   Administered Date(s) Administered    COVID-19 MODERNA VACC 0 5 ML IM 03/30/2021    Influenza Split High Dose Preservative Free IM 10/20/2014, 10/26/2016    Influenza, high dose seasonal 0 7 mL 01/04/2019, 01/27/2020, 10/22/2020, 10/08/2021    Pneumococcal Conjugate 13-Valent 06/21/2017    Pneumococcal Polysaccharide PPV23 12/07/2006      Health Maintenance:         Topic Date Due    Breast Cancer Screening: Mammogram  06/01/2017         Topic Date Due    COVID-19 Vaccine (2 - Moderna series) 04/27/2021    Influenza Vaccine (1) 09/01/2022      Medicare Screening Tests and Risk Assessments:     Anabel Quinn is here for her Subsequent Wellness visit  Health Risk Assessment:   Patient rates overall health as fair  Patient feels that their physical health rating is slightly worse  Patient is satisfied with their life  Eyesight was rated as same  Hearing was rated as same  Patient feels that their emotional and mental health rating is same  Patients states they are never, rarely angry  Patient states they are never, rarely unusually tired/fatigued  Pain experienced in the last 7 days has been none  Patient states that she has experienced no weight loss or gain in last 6 months   Physical health reports slightly worse- attributes to weight gain after hospital stay  Fall Risk Screening: In the past year, patient has experienced: no history of falling in past year      Urinary Incontinence Screening:   Patient has not leaked urine accidently in the last six months  Home Safety:  Patient does not have trouble with stairs inside or outside of their home  Patient has working smoke alarms Home safety hazards include: none  Nutrition:   Current diet is Regular  Medications:   Patient is currently taking over-the-counter supplements  OTC medications include: see medication list  Patient is able to manage medications  Activities of Daily Living (ADLs)/Instrumental Activities of Daily Living (IADLs):   Walk and transfer into and out of bed and chair?: Yes  Dress and groom yourself?: Yes    Bathe or shower yourself?: Yes    Feed yourself? Yes  Do your laundry/housekeeping?: Yes  Manage your money, pay your bills and track your expenses?: Yes  Make your own meals?: Yes    Do your own shopping?: Yes    Advance Care Planning:   Living will: Yes    Durable POA for healthcare:  Yes    Advanced directive: Yes      Cognitive Screening:   Provider or family/friend/caregiver concerned regarding cognition?: No    PREVENTIVE SCREENINGS      Cardiovascular Screening:    General: Screening Not Indicated and History Lipid Disorder      Diabetes Screening:       Due for: Blood Glucose      Colorectal Cancer Screening:     General: Risks and Benefits Discussed    Due for: FOBT/FIT      Breast Cancer Screening:     General: Risks and Benefits Discussed and Patient Declines      Cervical Cancer Screening:    General: Screening Not Indicated      Osteoporosis Screening:    General: Risks and Benefits Discussed    Due for: DXA Axial      Abdominal Aortic Aneurysm (AAA) Screening:    Risk factors include: family history of AAA        General: Risks and Benefits Discussed    Due for: Screening AAA Ultrasound      Lung Cancer Screening:     General: Screening Not Indicated      Hepatitis C Screening:    General: Risks and Benefits Discussed    Hep C Screening Accepted: Yes      Screening, Brief Intervention, and Referral to Treatment (SBIRT)    Screening    Typical number of drinks in a week: 1    Other Counseling Topics:   Car/seat belt/driving safety, skin self-exam, sunscreen and calcium and vitamin D intake and regular weightbearing exercise         /78 (BP Location: Left arm, Patient Position: Sitting, Cuff Size: Adult)   Pulse 84   Ht 4' 11 5" (1 511 m)   Wt 66 6 kg (146 lb 12 8 oz)   SpO2 94%   BMI 29 15 kg/m²         Maximilian Lorenzo DO

## 2023-06-21 ENCOUNTER — HOSPITAL ENCOUNTER (OUTPATIENT)
Dept: NON INVASIVE DIAGNOSTICS | Facility: CLINIC | Age: 83
Discharge: HOME | End: 2023-06-21
Payer: MEDICARE

## 2023-06-21 DIAGNOSIS — I73.9 PAD (PERIPHERAL ARTERY DISEASE) (HCC): ICD-10-CM

## 2023-06-21 DIAGNOSIS — I65.23 CAROTID STENOSIS, ASYMPTOMATIC, BILATERAL: ICD-10-CM

## 2023-06-21 PROCEDURE — 93923 UPR/LXTR ART STDY 3+ LVLS: CPT

## 2023-06-21 PROCEDURE — 93925 LOWER EXTREMITY STUDY: CPT

## 2023-06-21 PROCEDURE — 93880 EXTRACRANIAL BILAT STUDY: CPT

## 2023-06-22 ENCOUNTER — TRANSCRIBE ORDERS (OUTPATIENT)
Dept: VASCULAR SURGERY | Facility: CLINIC | Age: 83
End: 2023-06-22

## 2023-06-22 DIAGNOSIS — I65.23 CAROTID STENOSIS, ASYMPTOMATIC, BILATERAL: Primary | ICD-10-CM

## 2023-06-22 PROCEDURE — 93922 UPR/L XTREMITY ART 2 LEVELS: CPT | Performed by: SURGERY

## 2023-06-22 PROCEDURE — 93880 EXTRACRANIAL BILAT STUDY: CPT | Performed by: SURGERY

## 2023-06-22 PROCEDURE — 93925 LOWER EXTREMITY STUDY: CPT | Performed by: SURGERY

## 2023-07-12 ENCOUNTER — RA CDI HCC (OUTPATIENT)
Dept: OTHER | Facility: HOSPITAL | Age: 83
End: 2023-07-12

## 2023-07-12 NOTE — PROGRESS NOTES
[address] [facility] coding opportunities     I13.0 and I49.5     Chart Reviewed number of suggestions sent to Provider: 2     Patients Insurance     Medicare Insurance: Medicare

## 2023-07-18 ENCOUNTER — OFFICE VISIT (OUTPATIENT)
Dept: FAMILY MEDICINE CLINIC | Facility: MEDICAL CENTER | Age: 83
End: 2023-07-18
Payer: MEDICARE

## 2023-07-18 VITALS
TEMPERATURE: 97.6 F | HEART RATE: 88 BPM | BODY MASS INDEX: 26.74 KG/M2 | HEIGHT: 60 IN | SYSTOLIC BLOOD PRESSURE: 142 MMHG | DIASTOLIC BLOOD PRESSURE: 78 MMHG | OXYGEN SATURATION: 99 % | WEIGHT: 136.2 LBS

## 2023-07-18 DIAGNOSIS — E78.5 HYPERLIPIDEMIA, UNSPECIFIED HYPERLIPIDEMIA TYPE: ICD-10-CM

## 2023-07-18 DIAGNOSIS — Z12.31 ENCOUNTER FOR SCREENING MAMMOGRAM FOR BREAST CANCER: ICD-10-CM

## 2023-07-18 DIAGNOSIS — Z12.11 SCREENING FOR COLORECTAL CANCER: ICD-10-CM

## 2023-07-18 DIAGNOSIS — L28.2 PRURITIC RASH: ICD-10-CM

## 2023-07-18 DIAGNOSIS — H91.93 DECREASED HEARING OF BOTH EARS: ICD-10-CM

## 2023-07-18 DIAGNOSIS — Z00.00 MEDICARE ANNUAL WELLNESS VISIT, SUBSEQUENT: Primary | ICD-10-CM

## 2023-07-18 DIAGNOSIS — Z12.12 SCREENING FOR COLORECTAL CANCER: ICD-10-CM

## 2023-07-18 PROCEDURE — G0439 PPPS, SUBSEQ VISIT: HCPCS | Performed by: STUDENT IN AN ORGANIZED HEALTH CARE EDUCATION/TRAINING PROGRAM

## 2023-07-18 PROCEDURE — 99213 OFFICE O/P EST LOW 20 MIN: CPT | Performed by: STUDENT IN AN ORGANIZED HEALTH CARE EDUCATION/TRAINING PROGRAM

## 2023-07-18 RX ORDER — CLOTRIMAZOLE AND BETAMETHASONE DIPROPIONATE 10; .64 MG/G; MG/G
CREAM TOPICAL 2 TIMES DAILY
Qty: 30 G | Refills: 0 | Status: SHIPPED | OUTPATIENT
Start: 2023-07-18

## 2023-07-18 RX ORDER — HYDROXYZINE HYDROCHLORIDE 25 MG/1
25 TABLET, FILM COATED ORAL EVERY 6 HOURS PRN
Qty: 30 TABLET | Refills: 0 | Status: SHIPPED | OUTPATIENT
Start: 2023-07-18

## 2023-07-18 RX ORDER — PREDNISONE 20 MG/1
40 TABLET ORAL DAILY
Qty: 10 TABLET | Refills: 0 | Status: SHIPPED | OUTPATIENT
Start: 2023-07-18 | End: 2023-07-23

## 2023-07-18 NOTE — PATIENT INSTRUCTIONS
Medicare Preventive Visit Patient Instructions  Thank you for completing your Welcome to Medicare Visit or Medicare Annual Wellness Visit today. Your next wellness visit will be due in one year (7/18/2024). The screening/preventive services that you may require over the next 5-10 years are detailed below. Some tests may not apply to you based off risk factors and/or age. Screening tests ordered at today's visit but not completed yet may show as past due. Also, please note that scanned in results may not display below. Preventive Screenings:  Service Recommendations Previous Testing/Comments   Colorectal Cancer Screening  * Colonoscopy    * Fecal Occult Blood Test (FOBT)/Fecal Immunochemical Test (FIT)  * Fecal DNA/Cologuard Test  * Flexible Sigmoidoscopy Age: 43-73 years old   Colonoscopy: every 10 years (may be performed more frequently if at higher risk)  OR  FOBT/FIT: every 1 year  OR  Cologuard: every 3 years  OR  Sigmoidoscopy: every 5 years  Screening may be recommended earlier than age 39 if at higher risk for colorectal cancer. Also, an individualized decision between you and your healthcare provider will decide whether screening between the ages of 77-80 would be appropriate. Colonoscopy: 06/11/2012  FOBT/FIT: 01/25/2021  Cologuard: Not on file  Sigmoidoscopy: Not on file          Breast Cancer Screening Age: 36 years old  Frequency: every 1-2 years  Not required if history of left and right mastectomy Mammogram: 06/01/2015        Cervical Cancer Screening Between the ages of 21-29, pap smear recommended once every 3 years. Between the ages of 32-69, can perform pap smear with HPV co-testing every 5 years.    Recommendations may differ for women with a history of total hysterectomy, cervical cancer, or abnormal pap smears in past. Pap Smear: 10/20/2022    Screening Not Indicated   Hepatitis C Screening Once for adults born between 1945 and 1965  More frequently in patients at high risk for Hepatitis C Hep C Antibody: Not on file        Diabetes Screening 1-2 times per year if you're at risk for diabetes or have pre-diabetes Fasting glucose: 81 mg/dL (1/29/2021)  A1C: 5.5 % (2/5/2019)      Cholesterol Screening Once every 5 years if you don't have a lipid disorder. May order more often based on risk factors. Lipid panel: 12/04/2020    Screening Not Indicated  History Lipid Disorder     Other Preventive Screenings Covered by Medicare:  Abdominal Aortic Aneurysm (AAA) Screening: covered once if your at risk. You're considered to be at risk if you have a family history of AAA. Lung Cancer Screening: covers low dose CT scan once per year if you meet all of the following conditions: (1) Age 48-67; (2) No signs or symptoms of lung cancer; (3) Current smoker or have quit smoking within the last 15 years; (4) You have a tobacco smoking history of at least 20 pack years (packs per day multiplied by number of years you smoked); (5) You get a written order from a healthcare provider. Glaucoma Screening: covered annually if you're considered high risk: (1) You have diabetes OR (2) Family history of glaucoma OR (3)  aged 48 and older OR (3)  American aged 72 and older  Osteoporosis Screening: covered every 2 years if you meet one of the following conditions: (1) You're estrogen deficient and at risk for osteoporosis based off medical history and other findings; (2) Have a vertebral abnormality; (3) On glucocorticoid therapy for more than 3 months; (4) Have primary hyperparathyroidism; (5) On osteoporosis medications and need to assess response to drug therapy. Last bone density test (DXA Scan): 12/29/2022. HIV Screening: covered annually if you're between the age of 14-79. Also covered annually if you are younger than 13 and older than 72 with risk factors for HIV infection. For pregnant patients, it is covered up to 3 times per pregnancy.     Immunizations:  Immunization Recommendations   Influenza Vaccine Annual influenza vaccination during flu season is recommended for all persons aged >= 6 months who do not have contraindications   Pneumococcal Vaccine   * Pneumococcal conjugate vaccine = PCV13 (Prevnar 13), PCV15 (Vaxneuvance), PCV20 (Prevnar 20)  * Pneumococcal polysaccharide vaccine = PPSV23 (Pneumovax) Adults 20-63 years old: 1-3 doses may be recommended based on certain risk factors  Adults 72 years old: 1-2 doses may be recommended based off what pneumonia vaccine you previously received   Hepatitis B Vaccine 3 dose series if at intermediate or high risk (ex: diabetes, end stage renal disease, liver disease)   Tetanus (Td) Vaccine - COST NOT COVERED BY MEDICARE PART B Following completion of primary series, a booster dose should be given every 10 years to maintain immunity against tetanus. Td may also be given as tetanus wound prophylaxis. Tdap Vaccine - COST NOT COVERED BY MEDICARE PART B Recommended at least once for all adults. For pregnant patients, recommended with each pregnancy. Shingles Vaccine (Shingrix) - COST NOT COVERED BY MEDICARE PART B  2 shot series recommended in those aged 48 and above     Health Maintenance Due:      Topic Date Due    Breast Cancer Screening: Mammogram  01/19/2024 (Originally 6/1/2017)     Immunizations Due:      Topic Date Due    COVID-19 Vaccine (2 - Moderna series) 05/25/2021    Influenza Vaccine (1) 09/01/2023     Advance Directives   What are advance directives? Advance directives are legal documents that state your wishes and plans for medical care. These plans are made ahead of time in case you lose your ability to make decisions for yourself. Advance directives can apply to any medical decision, such as the treatments you want, and if you want to donate organs. What are the types of advance directives? There are many types of advance directives, and each state has rules about how to use them.  You may choose a combination of any of the following:  Living will: This is a written record of the treatment you want. You can also choose which treatments you do not want, which to limit, and which to stop at a certain time. This includes surgery, medicine, IV fluid, and tube feedings. Durable power of  for healthcare [facility]): This is a written record that states who you want to make healthcare choices for you when you are unable to make them for yourself. This person, called a proxy, is usually a family member or a friend. You may choose more than 1 proxy. Do not resuscitate (DNR) order:  A DNR order is used in case your heart stops beating or you stop breathing. It is a request not to have certain forms of treatment, such as CPR. A DNR order may be included in other types of advance directives. Medical directive: This covers the care that you want if you are in a coma, near death, or unable to make decisions for yourself. You can list the treatments you want for each condition. Treatment may include pain medicine, surgery, blood transfusions, dialysis, IV or tube feedings, and a ventilator (breathing machine). Values history: This document has questions about your views, beliefs, and how you feel and think about life. This information can help others choose the care that you would choose. Why are advance directives important? An advance directive helps you control your care. Although spoken wishes may be used, it is better to have your wishes written down. Spoken wishes can be misunderstood, or not followed. Treatments may be given even if you do not want them. An advance directive may make it easier for your family to make difficult choices about your care. Weight Management   Why it is important to manage your weight:  Being overweight increases your risk of health conditions such as heart disease, high blood pressure, type 2 diabetes, and certain types of cancer.  It can also increase your risk for osteoarthritis, sleep apnea, and other respiratory problems. Aim for a slow, steady weight loss. Even a small amount of weight loss can lower your risk of health problems. How to lose weight safely:  A safe and healthy way to lose weight is to eat fewer calories and get regular exercise. You can lose up about 1 pound a week by decreasing the number of calories you eat by 500 calories each day. Healthy meal plan for weight management:  A healthy meal plan includes a variety of foods, contains fewer calories, and helps you stay healthy. A healthy meal plan includes the following:  Eat whole-grain foods more often. A healthy meal plan should contain fiber. Fiber is the part of grains, fruits, and vegetables that is not broken down by your body. Whole-grain foods are healthy and provide extra fiber in your diet. Some examples of whole-grain foods are whole-wheat breads and pastas, oatmeal, brown rice, and bulgur. Eat a variety of vegetables every day. Include dark, leafy greens such as spinach, kale, roldan greens, and mustard greens. Eat yellow and orange vegetables such as carrots, sweet potatoes, and winter squash. Eat a variety of fruits every day. Choose fresh or canned fruit (canned in its own juice or light syrup) instead of juice. Fruit juice has very little or no fiber. Eat low-fat dairy foods. Drink fat-free (skim) milk or 1% milk. Eat fat-free yogurt and low-fat cottage cheese. Try low-fat cheeses such as mozzarella and other reduced-fat cheeses. Choose meat and other protein foods that are low in fat. Choose beans or other legumes such as split peas or lentils. Choose fish, skinless poultry (chicken or turkey), or lean cuts of red meat (beef or pork). Before you cook meat or poultry, cut off any visible fat. Use less fat and oil. Try baking foods instead of frying them. Add less fat, such as margarine, sour cream, regular salad dressing and mayonnaise to foods. Eat fewer high-fat foods.  Some examples of high-fat foods include french fries, doughnuts, ice cream, and cakes. Eat fewer sweets. Limit foods and drinks that are high in sugar. This includes candy, cookies, regular soda, and sweetened drinks. Exercise:  Exercise at least 30 minutes per day on most days of the week. Some examples of exercise include walking, biking, dancing, and swimming. You can also fit in more physical activity by taking the stairs instead of the elevator or parking farther away from stores. Ask your healthcare provider about the best exercise plan for you. © Copyright [employer] 2018 Information is for End User's use only and may not be sold, redistributed or otherwise used for commercial purposes. All illustrations and images included in CareNotes® are the copyrighted property of [employer] or [address] [place] [address]    Urticaria   WHAT YOU NEED TO KNOW:   Urticaria is also called hives. Hives can change size and shape, and appear anywhere on your skin. They can be mild or severe and last from a few minutes to a few days. Hives may be a sign of a severe allergic reaction called anaphylaxis that needs immediate treatment. Urticaria that lasts longer than 6 weeks may be a chronic condition that needs long-term treatment. DISCHARGE INSTRUCTIONS:   Call your local emergency number (911 in the [address]) for signs or symptoms of anaphylaxis,  such as trouble breathing, swelling in your mouth or throat, or wheezing. You may also have itching, a rash, or feel like you are going to faint. Return to the emergency department if:   Your heart is beating faster than it normally does. You have cramping or severe pain in your abdomen. Call your doctor if:   You have a fever. Your skin still itches 24 hours after you take your medicine. You still have hives after 7 days. Your joints are painful and swollen. You have questions or concerns about your condition or care. Medicines:   You may need any of the following:  Epinephrine  is used to treat severe allergic reactions such as anaphylaxis. Antihistamines  decrease mild symptoms such as itching or a rash. Steroids  decrease redness, pain, and swelling. Take your medicine as directed. Contact your healthcare provider if you think your medicine is not helping or if you have side effects. Tell your provider if you are allergic to any medicine. Keep a list of the medicines, vitamins, and herbs you take. Include the amounts, and when and why you take them. Bring the list or the pill bottles to follow-up visits. Carry your medicine list with you in case of an emergency. Steps to take for signs or symptoms of anaphylaxis:   Immediately  give 1 shot of epinephrine only into the outer thigh muscle. Leave the shot in place  as directed. Your provider may recommend you leave it in place for up to 10 seconds before you remove it. This helps make sure all of the epinephrine is delivered. Call 911 and go to the emergency department,  even if the shot improved symptoms. Do not drive yourself. Bring the used epinephrine shot with you. Safety precautions to take if you are at risk for anaphylaxis:   Keep 2 shots of epinephrine with you at all times. You may need a second shot, because epinephrine only works for about 20 minutes and symptoms may return. Your provider can show you and family members how to give the shot. Check the expiration date every month and replace it before it expires. Create an action plan. Your provider can help you create a written plan that explains the allergy and an emergency plan to treat a reaction. The plan explains when to give a second epinephrine shot if symptoms return or do not improve after the first. Give copies of the action plan and emergency instructions to family members, work and school staff, and  providers. Show them how to give a shot of epinephrine. Be careful when you exercise.   If you have had exercise-induced anaphylaxis, do not exercise right after you eat. Stop exercising right away if you start to develop any signs or symptoms of anaphylaxis. You may first feel tired, warm, or have itchy skin. Hives, swelling, and severe breathing problems may develop if you continue to exercise. Carry medical alert identification. Wear medical alert jewelry or carry a card that explains the allergy. Ask your provider where to get these items. Keep a record of triggers and symptoms. Record everything you eat, drink, or apply to your skin for 3 weeks. Include stressful events and what you were doing right before your hives started. Bring the record with you to follow-up visits with your provider. Manage urticaria:   Cool your skin. This may help decrease itching. Apply a cool pack to your hives. Dip a hand towel in cool water, wring it out, and place it on your hives. You may also soak your skin in a cool oatmeal bath. Do not rub your hives. This can irritate your skin and cause more hives. Wear loose clothing. Tight clothes may irritate your skin and cause more hives. Manage stress. Stress may trigger hives, or make them worse. Learn new ways to relax, such as deep breathing. Follow up with your healthcare provider as directed:  Write down your questions so you remember to ask them during your visits. © Copyright [employer] 2022 Information is for End User's use only and may not be sold, redistributed or otherwise used for commercial purposes. The above information is an  only. It is not intended as medical advice for individual conditions or treatments. Talk to your doctor, nurse or pharmacist before following any medical regimen to see if it is safe and effective for you.

## 2023-07-18 NOTE — ASSESSMENT & PLAN NOTE
· Urticarial rash breakout after eating certain fruits  · Prednisone course per orders  · Atarax per orders  · Follow-up in 1 week for rash recheck

## 2023-07-18 NOTE — ASSESSMENT & PLAN NOTE
· Immunizations reviewed  · Discussed Shingrix vaccination series, declines  · Discussed PCV 20 vaccine, defers at this time  · We will obtain records of her COVID-19 vaccines and review

## 2023-07-18 NOTE — PROGRESS NOTES
Assessment and Plan:     Problem List Items Addressed This Visit        Musculoskeletal and Integument    Pruritic rash     · Urticarial rash breakout after eating certain fruits  · Prednisone course per orders  · Atarax per orders  · Follow-up in 1 week for rash recheck         Relevant Medications    clotrimazole-betamethasone (LOTRISONE) 1-0.05 % cream    predniSONE 20 mg tablet    hydrOXYzine HCL (ATARAX) 25 mg tablet    Other Relevant Orders    CBC and differential    Lyme Total AB W Reflex to IGM/IGG       Other    Hyperlipidemia    Medicare annual wellness visit, subsequent - Primary     · Immunizations reviewed  · Discussed Shingrix vaccination series, declines  · Discussed PCV 20 vaccine, defers at this time  · We will obtain records of her COVID-19 vaccines and review        Other Visit Diagnoses     Decreased hearing of both ears        Relevant Orders    Ambulatory Referral to Audiology    Encounter for screening mammogram for breast cancer        Relevant Orders    Mammo screening bilateral w 3d & cad    Screening for colorectal cancer        Relevant Orders    Occult Blood, Fecal Immunochemical (FIT)    BMI 26.0-26.9,adult               Preventive health issues were discussed with patient, and age appropriate screening tests were ordered as noted in patient's After Visit Summary. Personalized health advice and appropriate referrals for health education or preventive services given if needed, as noted in patient's After Visit Summary. Follow-up in 1 week for rash recheck. History of Present Illness:     Patient presents for a Medicare Wellness Visit. Patient presents with her  who also contributes to history. Patient complaining of rash today, pruritic. Patient noticed rash after eating more fruit.     HPI   Patient Care Team:  Saundra Toscano DO as PCP - General (Family Medicine)  MD Caty Taylor MD [facility], [address], [address] 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[address])   • PAD (peripheral artery disease) ([address])   • Abnormal liver enzymes   • Edema of left upper extremity   • Urinary retention   • Seizure-like activity ([address])   • Hyperlipidemia   • Toxic metabolic encephalopathy   • Depression   • Current use of long term anticoagulation   • Long term current use of amiodarone   • Renal artery stenosis ([address])   • Pulmonary hypertension ([address])   • Sick sinus syndrome ([address])   • Hx of CABG   • CAD (coronary artery disease)   • Memory loss   • Mild cognitive impairment   • Atherosclerosis with claudication of extremity ([address])   • Carotid stenosis, asymptomatic, bilateral   • Acute (reversible) ischemia of small intestine, extent unspecified ([address])   • Cervical disc disorder with radiculopathy of mid-cervical region   • Cervical spondylosis   • Fall   • Wound of right leg   • Medicare annual wellness visit, subsequent   • Pruritic rash      Past Medical and Surgical History:     Past Medical History:   Diagnosis Date   • Anal fissure    • Cardiac disorder    • Cognitive changes 12/23/2020   • Esophageal reflux    • Esophagitis, reflux    • Hemorrhoids    • Hepatic hemangioma     Last Assessed: 1/13/2015   • Herpes zoster    • History of colonic polyps    • Hypertension    • Ischemic colitis ([address])    • Lumbar herniated disc    • Malignant neoplasm without specification of site [facility])    • Nephrolithiasis     L.  Lithotripsy   • Nontoxic single thyroid nodule     Last Assessed: 1/13/2015   • Osteoarthritis    • Overactive bladder    • Raynaud disease    • Respiratory system disease    • Sjogren's disease ([address] [facility])    • Spinal stenosis    • PONCHO (stress urinary incontinence, female)    • Uterovaginal prolapse     Grade I-II     Past Surgical History:   Procedure Laterality Date   • APPENDECTOMY  1947   • CARDIAC PACEMAKER PLACEMENT  01/2021   • CARDIAC SURGERY      CABG   • CATARACT EXTRACTION Bilateral    • COLONOSCOPY  2012    Fiberoptic   • COLONOSCOPY      Resolved: 2006 - 2012 5 year f/u   • CORONARY ANGIOPLASTY WITH STENT PLACEMENT     • CORONARY ARTERY BYPASS GRAFT      Resolved: 2012   • ESOPHAGOGASTRODUODENOSCOPY  2012    Diagnostic   • HEMORROIDECTOMY     • KNEE SURGERY     • LITHOTRIPSY      Renal   • MALIGNANT SKIN LESION EXCISION      Face; Resolved: 2004   • [place] ESOPHAGOGASTRODUODENOSCOPY TRANSORAL DIAGNOSTIC N/A 4/13/2016    Procedure: EGD AND COLONOSCOPY;  Surgeon: Raymond Conner MD;  [facility];   Service: Gastroenterology   • RENAL ARTERY STENT     • SKIN LESION EXCISION      Scalp   • SOFT TISSUE TUMOR RESECTION      Shoulder; Resolved: 1995   • THROMBOLYSIS      Postoperative Thrombolysis PTCA   • TONSILLECTOMY      Resolved: 1944      Family History:     Family History   Problem Relation Age of Onset   • Heart disease Mother    • Hypertension Mother    • Osteoporosis Mother    • Heart disease Father    • Hypertension Father    • No Known Problems Sister    • Heart disease Brother    • Diabetes Brother    • No Known Problems Daughter    • No Known Problems Son    • No Known Problems Maternal Grandmother    • No Known Problems Maternal Grandfather    • No Known Problems Paternal Grandmother    • No Known Problems Paternal Grandfather    • Ulcerative colitis Family    • Colon polyps Family    • Breast cancer Neg Hx    • Colon cancer Neg Hx    • Ovarian cancer Neg Hx       Social History:     Social History     Socioeconomic History   • Marital status: /Civil Union     Spouse name: None   • Number of children: None   • Years of education: None   • Highest education level: None   Occupational History   • Occupation: retired   Tobacco Use   • Smoking status: Never   • Smokeless tobacco: Never   Vaping Use   • Vaping Use: Never used   Substance and Sexual Activity   • Alcohol use: Yes     Alcohol/week: 1.0 standard drink of alcohol     Types: 1 Glasses of wine per week     Comment: occas   • Drug use: Never   • Sexual activity: Not Currently   Other Topics Concern   • None   Social History Narrative    Activities: golf    Caffeine use    Consumes healthy diet    Daily caffeinated coffee consumption: 1 cup per day    Drinks caffeinated tea: 1 cup per day    Well balanced diet     Social Determinants of Health     Financial Resource Strain: Low Risk  (7/18/2023)    Overall Financial Resource Strain (CARDIA)    • Difficulty of Paying Living Expenses: Not hard at all   Food Insecurity: Not on file   Transportation Needs: No Transportation Needs (7/18/2023)    PRAPARE - Transportation    • Lack of Transportation (Medical): No    • Lack of Transportation (Non-Medical):  No   Physical Activity: Not on file   Stress: Not on file   Social Connections: Not on file   Intimate Partner Violence: Not on file   Housing Stability: Not on file      Medications and Allergies:     Current Outpatient Medications   Medication Sig Dispense Refill   • amiodarone 200 mg tablet TAKE 1/2 TABLET (100 MG TOTAL) BY MOUTH DAILY WITH BREAKFAST 15 tablet 8   • atorvastatin (LIPITOR) 20 mg tablet TAKE 1 TABLET BY MOUTH EVERY DAY WITH DINNER 90 tablet 4   • bumetanide (BUMEX) 2 mg tablet TAKE 1 TABLET BY MOUTH EVERY DAY 30 tablet 8   • clotrimazole-betamethasone (LOTRISONE) 1-0.05 % cream Apply topically 2 (two) times a day 30 g 0   • hydrOXYzine HCL (ATARAX) 25 mg tablet Take 1 tablet (25 mg total) by mouth every 6 (six) hours as needed for itching 30 tablet 0   • metoprolol succinate (TOPROL-XL) 25 mg 24 hr tablet TAKE 1 TABLET BY MOUTH EVERY DAY 30 tablet 8   • multivitamin (THERAGRAN) TABS Take 1 tablet by mouth daily.     • nitroglycerin (NITROSTAT) 0.4 mg SL tablet Place 1 tablet (0.4 mg total) under the tongue every 5 (five) minutes as needed for chest pain 25 tablet 0   • prasugrel (EFFIENT) tablet TAKE 1 TABLET BY MOUTH EVERY DAY 30 tablet 11   • predniSONE 20 mg tablet Take 2 tablets (40 mg total) by mouth daily for 5 days 10 tablet 0   • sertraline (ZOLOFT) 25 mg tablet TAKE 1 TABLET BY MOUTH EVERY DAY 90 tablet 0   • Xarelto 15 MG tablet TAKE 1 TABLET BY MOUTH DAILY WITH DINNER. 30 tablet 8   • levETIRAcetam (KEPPRA) 500 mg tablet Take 1 tablet (500 mg total) by mouth every 12 (twelve) hours 60 tablet 3     No current facility-administered medications for this visit. Allergies   Allergen Reactions   • Sulfa Antibiotics Anaphylaxis   • Formaldehyde    • Codeine Palpitations      Immunizations:     Immunization History   Administered Date(s) Administered   • COVID-19 MODERNA VACC 0.5 ML IM 03/30/2021   • INFLUENZA 10/18/2022   • Influenza Split High Dose Preservative Free IM 10/20/2014, 10/26/2016   • Influenza, high dose seasonal 0.7 mL 01/04/2019, 01/27/2020, 10/22/2020, 10/08/2021, 10/18/2022   • Pneumococcal Conjugate 13-Valent 06/21/2017   • Pneumococcal Polysaccharide PPV23 12/07/2006   • Tdap 10/04/2022      Health Maintenance:         Topic Date Due   • Breast Cancer Screening: Mammogram  01/19/2024 (Originally 6/1/2017)         Topic Date Due   • COVID-19 Vaccine (2 - Moderna series) 05/25/2021   • Influenza Vaccine (1) 09/01/2023      Medicare Screening Tests and Risk Assessments:     Cortney Ambriz is here for her Subsequent Wellness visit. Health Risk Assessment:   Patient rates overall health as good. Patient feels that their physical health rating is same. Patient is satisfied with their life. Eyesight was rated as same. Hearing was rated as slightly worse. Patient feels that their emotional and mental health rating is same. Patients states they are sometimes angry.  Patient states they are sometimes unusually tired/fatigued. Pain experienced in the last 7 days has been none. Patient states that she has experienced no weight loss or gain in last 6 months. Fall Risk Screening: In the past year, patient has experienced: no history of falling in past year      Urinary Incontinence Screening:   Patient has not leaked urine accidently in the last six months. Home Safety:  Patient does not have trouble with stairs inside or outside of their home. Patient has working smoke alarms and has working carbon monoxide detector. Home safety hazards include: none. Nutrition:   Current diet is Regular. Medications:   Patient is currently taking over-the-counter supplements. OTC medications include: see medication list. Patient is not able to manage medications. Activities of Daily Living (ADLs)/Instrumental Activities of Daily Living (IADLs):   Walk and transfer into and out of bed and chair?: Yes  Dress and groom yourself?: Yes    Bathe or shower yourself?: Yes    Feed yourself? Yes  Do your laundry/housekeeping?: Yes  Manage your money, pay your bills and track your expenses?: Yes  Make your own meals?: Yes    Do your own shopping?: Yes    Previous Hospitalizations:   Any hospitalizations or ED visits within the last 12 months?: No      Advance Care Planning:   Living will: Yes    Durable POA for healthcare:  Yes    Advanced directive: Yes      Cognitive Screening:   Provider or family/friend/caregiver concerned regarding cognition?: No    PREVENTIVE SCREENINGS      Cardiovascular Screening:    General: History Lipid Disorder    Due for: Lipid Panel      Diabetes Screening:       Due for: Blood Glucose      Colorectal Cancer Screening:     General: Risks and Benefits Discussed    Due for: FOBT/FIT      Breast Cancer Screening:     General: Risks and Benefits Discussed      Cervical Cancer Screening:    General: Screening Not Indicated      Osteoporosis Screening:    General: Screening Current Abdominal Aortic Aneurysm (AAA) Screening:    Risk factors include: family history of AAA        Lung Cancer Screening:     General: Screening Not Indicated      Hepatitis C Screening:    General: Risks and Benefits Discussed and Patient Declines    Screening, Brief Intervention, and Referral to Treatment (SBIRT)    Screening  Typical number of drinks in a day: 1  Typical number of drinks in a week: 1  Interpretation: Low risk drinking behavior. Single Item Drug Screening:  How often have you used an illegal drug (including marijuana) or a prescription medication for non-medical reasons in the past year? never    Single Item Drug Screen Score: 0  Interpretation: Negative screen for possible drug use disorder    Other Counseling Topics:   Car/seat belt/driving safety and calcium and vitamin D intake and regular weightbearing exercise. Physical Exam:     /78 (BP Location: Left arm, Patient Position: Sitting, Cuff Size: Large)   Pulse 88   Temp 97.6 °F (36.4 °C)   Ht 5' (1.524 m)   Wt 61.8 kg (136 lb 3.2 oz)   SpO2 99%   BMI 26.60 kg/m²     Physical Exam  Vitals reviewed. Constitutional:       General: She is not in acute distress. Appearance: Normal appearance. She is not ill-appearing or toxic-appearing. HENT:      Head: Normocephalic and atraumatic. Eyes:      Conjunctiva/sclera: Conjunctivae normal.   Pulmonary:      Effort: Pulmonary effort is normal.   Skin:     General: Skin is warm and dry. Findings: Rash present. Rash is urticarial.      Comments: Erythematous raised patches on trunk and upper extremity   Neurological:      Mental Status: She is alert and oriented to person, place, and time. Psychiatric:         Mood and Affect: Mood normal.         Thought Content:  Thought content normal.          Marían Picking, DO

## 2023-07-19 ENCOUNTER — TELEPHONE (OUTPATIENT)
Dept: ADMINISTRATIVE | Facility: OTHER | Age: 83
End: 2023-07-19

## 2023-07-19 NOTE — TELEPHONE ENCOUNTER
----- Message from Oswald Valenzuela sent at 7/18/2023  4:02 PM EDT -----  Regarding: Covid  07/18/23 4:03 PM    Hello, our patient Dhaval Ang has had Immunization(s) completed/performed. Please assist in updating the patient chart by making an External outreach to [address] [place] [address] located in [address].      Thank you,  Oswald GUILLAUME Kingsford

## 2023-07-19 NOTE — TELEPHONE ENCOUNTER
Upon review of the In Basket request and the patient's chart, initial outreach has been made via fax to facility. Please see Contacts section for details.      Thank you  Analisa Oviedo

## 2023-07-19 NOTE — LETTER
Vaccination Request Form: [id number] aka SARS-CoV-2 (Remus Homans or [place] or [employer])      Date Requested: 23  Patient: Lupillo Joshi  Patient : [id number]   Referring Provider: Gretchen Barraza DO       The above patient has informed us that they have had their   most recent COVID-19 aka SARS-CoV-2 (Rem[employer] or [place] or [employer]) administered at your facility. Please   complete this form and attach all corresponding documentation. Date of Vaccine(s) Given  ______________________________    Lot Number(s) _______________________________________    Manufacture(s) ______________________________________    Dose Amount (s) _____________________________________    Expiration Date(s) ____________________________________    Comments __________________________________________________________  ____________________________________________________________________  ____________________________________________________________________  ____________________________________________________________________    Administering Facility  ________________________________________________    Vaccine Administered By (print name) ___________________________________      Form Completed By (print name) _______________________________________      Signature ___________________________________________________________      These reports are needed for  compliance. Please fax this completed form and a copy of the Vaccine Document(s) to our office located at [address] [place] [address] as soon as possible to Fax [phone] favian Cheek: Phone [phone]    We thank you for your assistance in treating our mutual patient.

## 2023-07-20 NOTE — TELEPHONE ENCOUNTER
Upon review of the In Basket request we were able to locate, review, and update the patient chart as requested for Immunization(s) Covid. Any additional questions or concerns should be emailed to the Practice Liaisons via the appropriate education email address, please do not reply via In Basket.     Thank you  Omer Ferro

## 2023-07-27 ENCOUNTER — REMOTE DEVICE CLINIC VISIT (OUTPATIENT)
Dept: CARDIOLOGY CLINIC | Facility: CLINIC | Age: 83
End: 2023-07-27
Payer: MEDICARE

## 2023-07-27 DIAGNOSIS — Z95.0 CARDIAC PACEMAKER IN SITU: Primary | ICD-10-CM

## 2023-07-27 PROCEDURE — 93294 REM INTERROG EVL PM/LDLS PM: CPT | Performed by: INTERNAL MEDICINE

## 2023-07-27 PROCEDURE — 93296 REM INTERROG EVL PM/IDS: CPT | Performed by: INTERNAL MEDICINE

## 2023-07-27 NOTE — PROGRESS NOTES
Results for orders placed or performed in visit on 07/27/23   Cardiac EP device report    Narrative    MDT-DUAL CHAMBER PPM (DDDR MODE)/ACTIVE SYSTEM IS MRI CONDITIONAL  CARELINK TRANSMISSION: BATTERY STATUS "8 YRS." AP 92%  100%. ALL AVAILABLE LEAD PARAMETERS WITHIN NORMAL LIMITS. NO SIGNIFICANT HIGH RATE EPISODES. NORMAL DEVICE FUNCTION.  NC

## 2023-09-03 DIAGNOSIS — L28.2 PRURITIC RASH: ICD-10-CM

## 2023-09-05 RX ORDER — CLOTRIMAZOLE AND BETAMETHASONE DIPROPIONATE 10; .64 MG/G; MG/G
1 CREAM TOPICAL 2 TIMES DAILY
Qty: 30 G | Refills: 0 | Status: SHIPPED | OUTPATIENT
Start: 2023-09-05

## 2023-09-16 PROBLEM — Z00.00 MEDICARE ANNUAL WELLNESS VISIT, SUBSEQUENT: Status: RESOLVED | Noted: 2023-07-18 | Resolved: 2023-09-16

## 2023-10-26 ENCOUNTER — REMOTE DEVICE CLINIC VISIT (OUTPATIENT)
Dept: CARDIOLOGY CLINIC | Facility: CLINIC | Age: 83
End: 2023-10-26
Payer: MEDICARE

## 2023-10-26 DIAGNOSIS — Z95.0 PRESENCE OF PERMANENT CARDIAC PACEMAKER: Primary | ICD-10-CM

## 2023-10-26 PROCEDURE — 93294 REM INTERROG EVL PM/LDLS PM: CPT | Performed by: INTERNAL MEDICINE

## 2023-10-26 PROCEDURE — 93296 REM INTERROG EVL PM/IDS: CPT | Performed by: INTERNAL MEDICINE

## 2023-10-26 NOTE — PROGRESS NOTES
Results for orders placed or performed in visit on 10/26/23   Cardiac EP device report    Narrative    MDT-DUAL CHAMBER PPM (DDDR MODE)/ACTIVE SYSTEM IS MRI CONDITIONAL  CARELINK TRANSMISSION: BATTERY VOLTAGE ADEQUATE. (7.8 YRS) AP 96%  99%. ALL AVAILABLE LEAD PARAMETERS WITHIN NORMAL LIMITS. NO SIGNIFICANT HIGH RATE EPISODES. NORMAL DEVICE FUNCTION. ---SEVILLA

## 2023-11-12 ENCOUNTER — APPOINTMENT (EMERGENCY)
Dept: CT IMAGING | Facility: HOSPITAL | Age: 83
End: 2023-11-12
Payer: MEDICARE

## 2023-11-12 ENCOUNTER — OFFICE VISIT (OUTPATIENT)
Dept: URGENT CARE | Facility: MEDICAL CENTER | Age: 83
End: 2023-11-12
Payer: MEDICARE

## 2023-11-12 ENCOUNTER — HOSPITAL ENCOUNTER (OUTPATIENT)
Facility: HOSPITAL | Age: 83
Setting detail: OBSERVATION
Discharge: HOME | End: 2023-11-13
Attending: EMERGENCY MEDICINE | Admitting: SURGERY
Payer: MEDICARE

## 2023-11-12 ENCOUNTER — APPOINTMENT (EMERGENCY)
Dept: RADIOLOGY | Facility: HOSPITAL | Age: 83
End: 2023-11-12
Payer: MEDICARE

## 2023-11-12 VITALS
HEART RATE: 91 BPM | BODY MASS INDEX: 26.7 KG/M2 | OXYGEN SATURATION: 99 % | WEIGHT: 136 LBS | DIASTOLIC BLOOD PRESSURE: 88 MMHG | RESPIRATION RATE: 18 BRPM | HEIGHT: 60 IN | SYSTOLIC BLOOD PRESSURE: 200 MMHG | TEMPERATURE: 97.9 F
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
## 2023-11-12 DIAGNOSIS — Z79.01 ANTICOAGULATED: ICD-10-CM

## 2023-11-12 DIAGNOSIS — S80.11XA HEMATOMA OF RIGHT LOWER LEG: ICD-10-CM

## 2023-11-12 DIAGNOSIS — S80.11XA HEMATOMA OF RIGHT LOWER LEG: Primary | ICD-10-CM

## 2023-11-12 DIAGNOSIS — S62.346A NONDISPLACED FRACTURE OF BASE OF FIFTH METACARPAL BONE, RIGHT HAND, INITIAL ENCOUNTER FOR CLOSED FRACTURE: ICD-10-CM

## 2023-11-12 DIAGNOSIS — S70.01XA CONTUSION OF RIGHT HIP, INITIAL ENCOUNTER: ICD-10-CM

## 2023-11-12 DIAGNOSIS — S69.91XA INJURY OF RIGHT WRIST, INITIAL ENCOUNTER: ICD-10-CM

## 2023-11-12 DIAGNOSIS — S40.019A SHOULDER CONTUSION: Primary | ICD-10-CM

## 2023-11-12 DIAGNOSIS — S41.111A LACERATION OF RIGHT UPPER EXTREMITY, INITIAL ENCOUNTER: ICD-10-CM

## 2023-11-12 PROBLEM — T07.XXXA MULTIPLE CONTUSIONS: Status: ACTIVE | Noted: 2023-11-12

## 2023-11-12 LAB
ABO GROUP BLD: NORMAL
ALBUMIN SERPL BCP-MCNC: 4.1 G/DL (ref 3.5–5)
ALP SERPL-CCNC: 74 U/L (ref 34–104)
ALT SERPL W P-5'-P-CCNC: 14 U/L (ref 7–52)
ANION GAP SERPL CALCULATED.3IONS-SCNC: 10 MMOL/L
APTT PPP: 34 SECONDS (ref 23–37)
AST SERPL W P-5'-P-CCNC: 20 U/L (ref 13–39)
ATRIAL RATE: 77 BPM
BASOPHILS # BLD AUTO: 0.04 THOUSANDS/ÂΜL (ref 0–0.1)
BASOPHILS NFR BLD AUTO: 1 % (ref 0–1)
BILIRUB SERPL-MCNC: 1.23 MG/DL (ref 0.2–1)
BLD GP AB SCN SERPL QL: NEGATIVE
BUN SERPL-MCNC: 17 MG/DL (ref 5–25)
CALCIUM SERPL-MCNC: 9.2 MG/DL (ref 8.4–10.2)
CHLORIDE SERPL-SCNC: 103 MMOL/L (ref 96–108)
CO2 SERPL-SCNC: 28 MMOL/L (ref 21–32)
CREAT SERPL-MCNC: 1.1 MG/DL (ref 0.6–1.3)
EOSINOPHIL # BLD AUTO: 0.34 THOUSAND/ÂΜL (ref 0–0.61)
EOSINOPHIL NFR BLD AUTO: 5 % (ref 0–6)
ERYTHROCYTE [DISTWIDTH] IN BLOOD BY AUTOMATED COUNT: 14 % (ref 11.6–15.1)
GFR SERPL CREATININE-BSD FRML MDRD: 46 ML/MIN/1.73SQ M
GLUCOSE SERPL-MCNC: 89 MG/DL (ref 65–140)
HCT VFR BLD AUTO: 39.5 % (ref 34.8–46.1)
HCT VFR BLD AUTO: 40.2 % (ref 34.8–46.1)
HGB BLD-MCNC: 12.7 G/DL (ref 11.5–15.4)
HGB BLD-MCNC: 12.9 G/DL (ref 11.5–15.4)
IMM GRANULOCYTES # BLD AUTO: 0.03 THOUSAND/UL (ref 0–0.2)
IMM GRANULOCYTES NFR BLD AUTO: 0 % (ref 0–2)
INR PPP: 1.39 (ref 0.84–1.19)
LYMPHOCYTES # BLD AUTO: 1.11 THOUSANDS/ÂΜL (ref 0.6–4.47)
LYMPHOCYTES NFR BLD AUTO: 15 % (ref 14–44)
MCH RBC QN AUTO: 29.3 PG (ref 26.8–34.3)
MCHC RBC AUTO-ENTMCNC: 32.1 G/DL (ref 31.4–37.4)
MCV RBC AUTO: 91 FL (ref 82–98)
MONOCYTES # BLD AUTO: 0.68 THOUSAND/ÂΜL (ref 0.17–1.22)
MONOCYTES NFR BLD AUTO: 9 % (ref 4–12)
NEUTROPHILS # BLD AUTO: 5.21 THOUSANDS/ÂΜL (ref 1.85–7.62)
NEUTS SEG NFR BLD AUTO: 70 % (ref 43–75)
NRBC BLD AUTO-RTO: 0 /100 WBCS
PLATELET # BLD AUTO: 186 THOUSANDS/UL (ref 149–390)
PMV BLD AUTO: 11.2 FL (ref 8.9–12.7)
POTASSIUM SERPL-SCNC: 3.6 MMOL/L (ref 3.5–5.3)
PR INTERVAL: 80 MS
PROT SERPL-MCNC: 7 G/DL (ref 6.4–8.4)
PROTHROMBIN TIME: 17.8 SECONDS (ref 11.6–14.5)
QRS AXIS: -37 DEGREES
QRSD INTERVAL: 156 MS
QT INTERVAL: 476 MS
QTC INTERVAL: 538 MS
RBC # BLD AUTO: 4.41 MILLION/UL (ref 3.81–5.12)
RH BLD: POSITIVE
SODIUM SERPL-SCNC: 141 MMOL/L (ref 135–147)
SPECIMEN EXPIRATION DATE: NORMAL
T WAVE AXIS: 258 DEGREES
VENTRICULAR RATE: 77 BPM
WBC # BLD AUTO: 7.41 THOUSAND/UL (ref 4.31–10.16)

## 2023-11-12 PROCEDURE — 99285 EMERGENCY DEPT VISIT HI MDM: CPT

## 2023-11-12 PROCEDURE — 73130 X-RAY EXAM OF HAND: CPT

## 2023-11-12 PROCEDURE — 86900 BLOOD TYPING SEROLOGIC ABO: CPT | Performed by: PHYSICIAN ASSISTANT

## 2023-11-12 PROCEDURE — 85014 HEMATOCRIT: CPT | Performed by: PHYSICIAN ASSISTANT

## 2023-11-12 PROCEDURE — 74177 CT ABD & PELVIS W/CONTRAST: CPT

## 2023-11-12 PROCEDURE — 99223 1ST HOSP IP/OBS HIGH 75: CPT | Performed by: SURGERY

## 2023-11-12 PROCEDURE — 71260 CT THORAX DX C+: CPT

## 2023-11-12 PROCEDURE — 73110 X-RAY EXAM OF WRIST: CPT

## 2023-11-12 PROCEDURE — 73552 X-RAY EXAM OF FEMUR 2/>: CPT

## 2023-11-12 PROCEDURE — G0008 ADMIN INFLUENZA VIRUS VAC: HCPCS | Performed by: SURGERY

## 2023-11-12 PROCEDURE — 99285 EMERGENCY DEPT VISIT HI MDM: CPT | Performed by: EMERGENCY MEDICINE

## 2023-11-12 PROCEDURE — G1004 CDSM NDSC: HCPCS

## 2023-11-12 PROCEDURE — 70450 CT HEAD/BRAIN W/O DYE: CPT

## 2023-11-12 PROCEDURE — 85025 COMPLETE CBC W/AUTO DIFF WBC: CPT | Performed by: EMERGENCY MEDICINE

## 2023-11-12 PROCEDURE — 85730 THROMBOPLASTIN TIME PARTIAL: CPT | Performed by: EMERGENCY MEDICINE

## 2023-11-12 PROCEDURE — 93005 ELECTROCARDIOGRAM TRACING: CPT

## 2023-11-12 PROCEDURE — 80053 COMPREHEN METABOLIC PANEL: CPT | Performed by: EMERGENCY MEDICINE

## 2023-11-12 PROCEDURE — 85610 PROTHROMBIN TIME: CPT | Performed by: EMERGENCY MEDICINE

## 2023-11-12 PROCEDURE — 85018 HEMOGLOBIN: CPT | Performed by: PHYSICIAN ASSISTANT

## 2023-11-12 PROCEDURE — 29125 APPL SHORT ARM SPLINT STATIC: CPT | Performed by: EMERGENCY MEDICINE

## 2023-11-12 PROCEDURE — 36415 COLL VENOUS BLD VENIPUNCTURE: CPT | Performed by: EMERGENCY MEDICINE

## 2023-11-12 PROCEDURE — 73030 X-RAY EXAM OF SHOULDER: CPT

## 2023-11-12 PROCEDURE — 99213 OFFICE O/P EST LOW 20 MIN: CPT | Performed by: STUDENT IN AN ORGANIZED HEALTH CARE EDUCATION/TRAINING PROGRAM

## 2023-11-12 PROCEDURE — 93010 ELECTROCARDIOGRAM REPORT: CPT | Performed by: INTERNAL MEDICINE

## 2023-11-12 PROCEDURE — 90662 IIV NO PRSV INCREASED AG IM: CPT | Performed by: SURGERY

## 2023-11-12 PROCEDURE — 86850 RBC ANTIBODY SCREEN: CPT | Performed by: PHYSICIAN ASSISTANT

## 2023-11-12 PROCEDURE — 96374 THER/PROPH/DIAG INJ IV PUSH: CPT

## 2023-11-12 PROCEDURE — G0463 HOSPITAL OUTPT CLINIC VISIT: HCPCS | Performed by: STUDENT IN AN ORGANIZED HEALTH CARE EDUCATION/TRAINING PROGRAM

## 2023-11-12 PROCEDURE — 86901 BLOOD TYPING SEROLOGIC RH(D): CPT | Performed by: PHYSICIAN ASSISTANT

## 2023-11-12 PROCEDURE — 72125 CT NECK SPINE W/O DYE: CPT

## 2023-11-12 PROCEDURE — 73590 X-RAY EXAM OF LOWER LEG: CPT

## 2023-11-12 RX ORDER — AMIODARONE HYDROCHLORIDE 100 MG/1
100 TABLET ORAL
Status: CANCELLED | OUTPATIENT
Start: 2023-11-13

## 2023-11-12 RX ORDER — LEVETIRACETAM 250 MG/1
500 TABLET ORAL EVERY 12 HOURS
Status: CANCELLED | OUTPATIENT
Start: 2023-11-12

## 2023-11-12 RX ORDER — SERTRALINE HYDROCHLORIDE 25 MG/1
25 TABLET, FILM COATED ORAL DAILY
Status: DISCONTINUED | OUTPATIENT
Start: 2023-11-13 | End: 2023-11-13 | Stop reason: HOSPADM

## 2023-11-12 RX ORDER — BUMETANIDE 1 MG/1
2 TABLET ORAL DAILY
Status: CANCELLED | OUTPATIENT
Start: 2023-11-12

## 2023-11-12 RX ORDER — ACETAMINOPHEN 325 MG/1
975 TABLET ORAL EVERY 8 HOURS
Status: DISCONTINUED | OUTPATIENT
Start: 2023-11-12 | End: 2023-11-13 | Stop reason: HOSPADM

## 2023-11-12 RX ORDER — LIDOCAINE HYDROCHLORIDE 10 MG/ML
10 INJECTION, SOLUTION EPIDURAL; INFILTRATION; INTRACAUDAL; PERINEURAL ONCE
Status: COMPLETED | OUTPATIENT
Start: 2023-11-12 | End: 2023-11-12

## 2023-11-12 RX ORDER — METOPROLOL SUCCINATE 25 MG/1
25 TABLET, EXTENDED RELEASE ORAL DAILY
Status: CANCELLED | OUTPATIENT
Start: 2023-11-12

## 2023-11-12 RX ORDER — ATORVASTATIN CALCIUM 20 MG/1
20 TABLET, FILM COATED ORAL
Status: DISCONTINUED | OUTPATIENT
Start: 2023-11-12 | End: 2023-11-13 | Stop reason: HOSPADM

## 2023-11-12 RX ORDER — ONDANSETRON 2 MG/ML
4 INJECTION INTRAMUSCULAR; INTRAVENOUS EVERY 4 HOURS PRN
Status: DISCONTINUED | OUTPATIENT
Start: 2023-11-12 | End: 2023-11-12 | Stop reason: ALTCHOICE

## 2023-11-12 RX ORDER — ACETAMINOPHEN 325 MG/1
650 TABLET ORAL ONCE
Status: DISCONTINUED | OUTPATIENT
Start: 2023-11-12 | End: 2023-11-12 | Stop reason: SDUPTHER

## 2023-11-12 RX ORDER — SENNOSIDES 8.6 MG
2 TABLET ORAL DAILY
Status: DISCONTINUED | OUTPATIENT
Start: 2023-11-12 | End: 2023-11-13 | Stop reason: HOSPADM

## 2023-11-12 RX ORDER — NITROGLYCERIN 0.4 MG/1
0.4 TABLET SUBLINGUAL
Status: CANCELLED | OUTPATIENT
Start: 2023-11-12

## 2023-11-12 RX ORDER — ONDANSETRON 2 MG/ML
4 INJECTION INTRAMUSCULAR; INTRAVENOUS EVERY 4 HOURS PRN
Status: CANCELLED | OUTPATIENT
Start: 2023-11-12

## 2023-11-12 RX ORDER — DOCUSATE SODIUM 100 MG/1
100 CAPSULE, LIQUID FILLED ORAL 2 TIMES DAILY
Status: DISCONTINUED | OUTPATIENT
Start: 2023-11-12 | End: 2023-11-13 | Stop reason: HOSPADM

## 2023-11-12 RX ORDER — ONDANSETRON 4 MG/1
4 TABLET, ORALLY DISINTEGRATING ORAL EVERY 6 HOURS PRN
Status: DISCONTINUED | OUTPATIENT
Start: 2023-11-12 | End: 2023-11-12

## 2023-11-12 RX ORDER — SERTRALINE HYDROCHLORIDE 25 MG/1
25 TABLET, FILM COATED ORAL DAILY
Status: CANCELLED | OUTPATIENT
Start: 2023-11-12

## 2023-11-12 RX ORDER — BACITRACIN, NEOMYCIN, POLYMYXIN B 400; 3.5; 5 [USP'U]/G; MG/G; [USP'U]/G
1 OINTMENT TOPICAL ONCE
Status: COMPLETED | OUTPATIENT
Start: 2023-11-12 | End: 2023-11-12

## 2023-11-12 RX ORDER — OXYCODONE HYDROCHLORIDE 5 MG/1
5 TABLET ORAL EVERY 6 HOURS PRN
Status: DISCONTINUED | OUTPATIENT
Start: 2023-11-12 | End: 2023-11-13 | Stop reason: HOSPADM

## 2023-11-12 RX ORDER — ATORVASTATIN CALCIUM 40 MG/1
20 TABLET, FILM COATED ORAL
Status: CANCELLED | OUTPATIENT
Start: 2023-11-12

## 2023-11-12 RX ORDER — FENTANYL CITRATE 50 UG/ML
25 INJECTION, SOLUTION INTRAMUSCULAR; INTRAVENOUS ONCE
Status: COMPLETED | OUTPATIENT
Start: 2023-11-12 | End: 2023-11-12

## 2023-11-12 RX ADMIN — INFLUENZA A VIRUS A/VICTORIA/4897/2022 IVR-238 (H1N1) ANTIGEN (FORMALDEHYDE INACTIVATED), INFLUENZA A VIRUS A/DARWIN/9/2021 SAN-010 (H3N2) ANTIGEN (FORMALDEHYDE INACTIVATED), INFLUENZA B VIRUS B/PHUKET/3073/2013 ANTIGEN (FORMALDEHYDE INACTIVATED), AND INFLUENZA B VIRUS B/MICHIGAN/01/2021 ANTIGEN (FORMALDEHYDE INACTIVATED) 0.7 ML: 60; 60; 60; 60 INJECTION, SUSPENSION INTRAMUSCULAR at 18:25

## 2023-11-12 RX ADMIN — LIDOCAINE HYDROCHLORIDE 10 ML: 10 INJECTION, SOLUTION EPIDURAL; INFILTRATION; INTRACAUDAL; PERINEURAL at 15:00

## 2023-11-12 RX ADMIN — FENTANYL CITRATE 25 MCG: 50 INJECTION INTRAMUSCULAR; INTRAVENOUS at 12:25

## 2023-11-12 RX ADMIN — SENNOSIDES 17.2 MG: 8.6 TABLET, FILM COATED ORAL at 16:05

## 2023-11-12 RX ADMIN — ACETAMINOPHEN 975 MG: 325 TABLET, FILM COATED ORAL at 16:05

## 2023-11-12 RX ADMIN — ATORVASTATIN CALCIUM 20 MG: 20 TABLET, FILM COATED ORAL at 18:25

## 2023-11-12 RX ADMIN — IOHEXOL 80 ML: 350 INJECTION, SOLUTION INTRAVENOUS at 13:06

## 2023-11-12 RX ADMIN — BACITRACIN ZINC, NEOMYCIN, POLYMYXIN B 1 SMALL APPLICATION: 400; 3.5; 5 OINTMENT TOPICAL at 16:08

## 2023-11-12 NOTE — ASSESSMENT & PLAN NOTE
- Patient with mild cognitive impairment, present on presentation.  - Geriatric medicine consultation to assist with evaluation and management. - Outpatient follow-up per routine.

## 2023-11-12 NOTE — PROGRESS NOTES
[facility]        NAME: Mani Albarran is a 79 y.o. female  : 1940    MRN: [id number]  DATE: 2023  TIME: 11:02 AM    Assessment and Orders   Hematoma of right lower leg [S80.11XA]  1. Hematoma of right lower leg        2. Injury of right wrist, initial encounter              Plan and Discussion      Patient presenting with significant hematoma on the lower right leg. Hips are stable. Right wrist skin tear and complaining of arm pain. Denies LOC. Did not strike her head. Patient unable to ambulate. Endorses excruciating pain when lower leg is touched. Does not tolerate moving her leg. EMS called and patient transferred to ED for pain control. Discussed ED precautions including (but not limited to)  Difficultly breathing or shortness of breath  Chest pain  Acutely worsening symptoms. Risks and benefits discussed. Patient understands and agrees with the plan. Follow up with PCP. Chief Complaint     Chief Complaint   Patient presents with    Fall     Patient got out of car today and tripped over barrier; patient has 10/10 pain in right upper/lower leg (unable to bear weight), right wrist/hand; denies head strike or loc but does take xarelto; TDAP UTD     Wrist Injury     Right wrist/hand laceration/pain after falling on cement; takes xarelto daily     Leg Injury     Right leg injury post fall; hematoma noted to right lower extremity          History of Present Illness       Patient is an 48-year-old female who presents after a fall getting out of her car. Patient states she had a concrete barrier and fell over the side. Laurita Kevink on her right side. She now has skin tear on her right wrist and a large hematoma developing on her right lower leg. She states that the pain in her right lower leg is excruciating. She is unable to bear weight. She does take Xarelto daily. She denies loss of consciousness and striking her head.   She denies dizziness and blurred vision. Pain with ankle and toe movements. Fall        Review of Systems   Review of Systems  As stated above     Current Medications       Current Outpatient Medications:     amiodarone 200 mg tablet, TAKE 1/2 TABLET (100 MG TOTAL) BY MOUTH DAILY WITH BREAKFAST, Disp: 15 tablet, Rfl: 8    atorvastatin (LIPITOR) 20 mg tablet, TAKE 1 TABLET BY MOUTH EVERY DAY WITH DINNER, Disp: 90 tablet, Rfl: 4    bumetanide (BUMEX) 2 mg tablet, TAKE 1 TABLET BY MOUTH EVERY DAY, Disp: 30 tablet, Rfl: 8    clotrimazole-betamethasone (LOTRISONE) 1-0.05 % cream, APPLY TO AFFECTED AREA TWICE A DAY, Disp: 30 g, Rfl: 0    hydrOXYzine HCL (ATARAX) 25 mg tablet, Take 1 tablet (25 mg total) by mouth every 6 (six) hours as needed for itching, Disp: 30 tablet, Rfl: 0    levETIRAcetam (KEPPRA) 500 mg tablet, Take 1 tablet (500 mg total) by mouth every 12 (twelve) hours, Disp: 60 tablet, Rfl: 3    metoprolol succinate (TOPROL-XL) 25 mg 24 hr tablet, TAKE 1 TABLET BY MOUTH EVERY DAY, Disp: 30 tablet, Rfl: 8    multivitamin (THERAGRAN) TABS, Take 1 tablet by mouth daily. , Disp: , Rfl:     nitroglycerin (NITROSTAT) 0.4 mg SL tablet, Place 1 tablet (0.4 mg total) under the tongue every 5 (five) minutes as needed for chest pain, Disp: 25 tablet, Rfl: 0    prasugrel (EFFIENT) tablet, TAKE 1 TABLET BY MOUTH EVERY DAY, Disp: 30 tablet, Rfl: 11    sertraline (ZOLOFT) 25 mg tablet, TAKE 1 TABLET BY MOUTH EVERY DAY, Disp: 90 tablet, Rfl: 0    Xarelto 15 MG tablet, TAKE 1 TABLET BY MOUTH DAILY WITH DINNER., Disp: 30 tablet, Rfl: 8    Current Allergies     Allergies as of 11/12/2023 - Reviewed 11/12/2023   Allergen Reaction Noted    Sulfa antibiotics Anaphylaxis 04/12/2016    Formaldehyde  05/31/2016    Codeine Palpitations 04/12/2016            The following portions of the patient's history were reviewed and updated as appropriate: allergies, current medications, past family history, past medical history, past social history, past surgical history and problem list.     Past Medical History:   Diagnosis Date    Anal fissure     Cardiac disorder     Cognitive changes 12/23/2020    Esophageal reflux     Esophagitis, reflux     Hemorrhoids     Hepatic hemangioma     Last Assessed: 1/13/2015    Herpes zoster     History of colonic polyps     Hypertension     Ischemic colitis ([address])     Lumbar herniated disc     Malignant neoplasm without specification of site ([address])     Nephrolithiasis     L. Lithotripsy    Nontoxic single thyroid nodule     Last Assessed: 1/13/2015    Osteoarthritis     Overactive bladder     Raynaud disease     Respiratory system disease     Sjogren's disease ([address])     Spinal stenosis     PONCHO (stress urinary incontinence, female)     Uterovaginal prolapse     Grade I-II       Past Surgical History:   Procedure Laterality Date    APPENDECTOMY  1947    CARDIAC PACEMAKER PLACEMENT  01/2021    CARDIAC SURGERY      CABG    CATARACT EXTRACTION Bilateral     COLONOSCOPY  2012    Fiberoptic    COLONOSCOPY      Resolved: 2006 - 2012 5 year f/u    CORONARY ANGIOPLASTY WITH STENT PLACEMENT      CORONARY ARTERY BYPASS GRAFT      Resolved: 2012    ESOPHAGOGASTRODUODENOSCOPY  2012    Diagnostic    HEMORROIDECTOMY      KNEE SURGERY      LITHOTRIPSY      Renal    MALIGNANT SKIN LESION EXCISION      Face; Resolved: 2004    IN ESOPHAGOGASTRODUODENOSCOPY TRANSORAL DIAGNOSTIC N/A 4/13/2016    Procedure: EGD AND COLONOSCOPY;  Surgeon: Lina Mendez MD;  Location: [facility];   Service: Gastroenterology    RENAL ARTERY STENT      SKIN LESION EXCISION      Scalp    SOFT TISSUE TUMOR RESECTION      Shoulder; Resolved: 1995    THROMBOLYSIS      Postoperative Thrombolysis PTCA    TONSILLECTOMY      Resolved: 1944       Family History   Problem Relation Age of Onset    Heart disease Mother     Hypertension Mother     Osteoporosis Mother     Heart disease Father     Hypertension Father     No Known Problems Sister     Heart disease Brother     Diabetes Brother     No Known Problems Daughter     No Known Problems Son     No Known Problems Maternal Grandmother     No Known Problems Maternal Grandfather     No Known Problems Paternal Grandmother     No Known Problems Paternal Grandfather     Ulcerative colitis Family     Colon polyps Family     Breast cancer Neg Hx     Colon cancer Neg Hx     Ovarian cancer Neg Hx          Medications have been verified. Objective   BP (!) 200/88   Pulse 91   Temp 97.9 °F (36.6 °C) (Temporal)   Resp 18   Ht 5' (1.524 m)   Wt 61.7 kg (136 lb)   SpO2 99%   BMI 26.56 kg/m²   No LMP recorded. Patient is postmenopausal.       Physical Exam     Physical Exam  Constitutional:       General: She is in acute distress (crying in pain). Pulmonary:      Effort: Pulmonary effort is normal. No respiratory distress. Musculoskeletal:         General: Tenderness and signs of injury present. Skin:     Findings: Bruising and lesion present. Neurological:      General: No focal deficit present. Mental Status: She is oriented to person, place, and time.                Jeraline Schilder DO

## 2023-11-12 NOTE — ASSESSMENT & PLAN NOTE
- Patient with chronic history of CAD without evidence of acute issue.  - Continue current medication regimen. - Plan to hold antiplatelet and anticoagulant medications due to multiple contusions/hematomas. - Monitor for adequate heart rate control.  - Resume home medication therapy on discharge as appropriate. - Outpatient follow-up per routine.

## 2023-11-12 NOTE — PLAN OF CARE
Problem: MOBILITY - ADULT  Goal: Maintain or return to baseline ADL function  Description: INTERVENTIONS:  -  Assess patient's ability to carry out ADLs; assess patient's baseline for ADL function and identify physical deficits which impact ability to perform ADLs (bathing, care of mouth/teeth, toileting, grooming, dressing, etc.)  - Assess/evaluate cause of self-care deficits   - Assess range of motion  - Assess patient's mobility; develop plan if impaired  - Assess patient's need for assistive devices and provide as appropriate  - Encourage maximum independence but intervene and supervise when necessary  - Involve family in performance of ADLs  - Assess for home care needs following discharge   - Consider OT consult to assist with ADL evaluation and planning for discharge  - Provide patient education as appropriate  Outcome: Progressing  Goal: Maintains/Returns to pre admission functional level  Description: INTERVENTIONS:  - Perform BMAT or MOVE assessment daily.   - Set and communicate daily mobility goal to care team and patient/family/caregiver.    - Collaborate with rehabilitation services on mobility goals if consulted  - Out of bed for toileting  - Record patient progress and toleration of activity level   Outcome: Progressing     Problem: PAIN - ADULT  Goal: Verbalizes/displays adequate comfort level or baseline comfort level  Description: Interventions:  - Encourage patient to monitor pain and request assistance  - Assess pain using appropriate pain scale  - Administer analgesics based on type and severity of pain and evaluate response  - Implement non-pharmacological measures as appropriate and evaluate response  - Consider cultural and social influences on pain and pain management  - Notify physician/advanced practitioner if interventions unsuccessful or patient reports new pain  Outcome: Progressing     Problem: INFECTION - ADULT  Goal: Absence or prevention of progression during hospitalization  Description: INTERVENTIONS:  - Assess and monitor for signs and symptoms of infection  - Monitor lab/diagnostic results  - Monitor all insertion sites, i.e. indwelling lines, tubes, and drains  - Monitor endotracheal if appropriate and nasal secretions for changes in amount and color  - [place] appropriate cooling/warming therapies per order  - Administer medications as ordered  - Instruct and encourage patient and family to use good hand hygiene technique  - Identify and instruct in appropriate isolation precautions for identified infection/condition  Outcome: Progressing  Goal: Absence of fever/infection during neutropenic period  Description: INTERVENTIONS:  - Monitor WBC    Outcome: Progressing     Problem: SAFETY ADULT  Goal: Maintain or return to baseline ADL function  Description: INTERVENTIONS:  -  Assess patient's ability to carry out ADLs; assess patient's baseline for ADL function and identify physical deficits which impact ability to perform ADLs (bathing, care of mouth/teeth, toileting, grooming, dressing, etc.)  - Assess/evaluate cause of self-care deficits   - Assess range of motion  - Assess patient's mobility; develop plan if impaired  - Assess patient's need for assistive devices and provide as appropriate  - Encourage maximum independence but intervene and supervise when necessary  - Involve family in performance of ADLs  - Assess for home care needs following discharge   - Consider OT consult to assist with ADL evaluation and planning for discharge  - Provide patient education as appropriate  Outcome: Progressing  Goal: Maintains/Returns to pre admission functional level  Description: INTERVENTIONS:  - Perform BMAT or MOVE assessment daily.   - Set and communicate daily mobility goal to care team and patient/family/caregiver.    - Collaborate with rehabilitation services on mobility goals if consulted  - Out of bed for toileting  - Record patient progress and toleration of activity level   Outcome: Progressing  Goal: Patient will remain free of falls  Description: INTERVENTIONS:  - Educate patient/family on patient safety including physical limitations  - Instruct patient to call for assistance with activity   - Consult OT/PT to assist with strengthening/mobility   - Keep Call bell within reach  - Keep bed low and locked with side rails adjusted as appropriate  - Keep care items and personal belongings within reach  - Initiate and maintain comfort rounds  - Make Fall Risk Sign visible to staff  - Apply yellow socks and bracelet for high fall risk patients  - Consider moving patient to room near nurses station  Outcome: Progressing     Problem: DISCHARGE PLANNING  Goal: Discharge to home or other facility with appropriate resources  Description: INTERVENTIONS:  - Identify barriers to discharge w/patient and caregiver  - Arrange for needed discharge resources and transportation as appropriate  - Identify discharge learning needs (meds, wound care, etc.)  - Arrange for interpretive services to assist at discharge as needed  - Refer to Case Management Department for coordinating discharge planning if the patient needs post-hospital services based on physician/advanced practitioner order or complex needs related to functional status, cognitive ability, or social support system  Outcome: Progressing     Problem: Knowledge Deficit  Goal: Patient/family/caregiver demonstrates understanding of disease process, treatment plan, medications, and discharge instructions  Description: Complete learning assessment and assess knowledge base. Interventions:  - Provide teaching at level of understanding  - Provide teaching via preferred learning methods  Outcome: Progressing     Problem: Nutrition/Hydration-ADULT  Goal: Nutrient/Hydration intake appropriate for improving, restoring or maintaining nutritional needs  Description: Monitor and assess patient's nutrition/hydration status for malnutrition. Collaborate with interdisciplinary team and initiate plan and interventions as ordered. Monitor patient's weight and dietary intake as ordered or per policy. Utilize nutrition screening tool and intervene as necessary. Determine patient's food preferences and provide high-protein, high-caloric foods as appropriate.      INTERVENTIONS:  - Monitor oral intake, urinary output, labs, and treatment plans  - Assess nutrition and hydration status and recommend course of action  - Evaluate amount of meals eaten  - Assist patient with eating if necessary   - Allow adequate time for meals  - Recommend/ encourage appropriate diets, oral nutritional supplements, and vitamin/mineral supplements  - Order, calculate, and assess calorie counts as needed  - Recommend, monitor, and adjust tube feedings and TPN/PPN based on assessed needs  - Assess need for intravenous fluids  - Provide specific nutrition/hydration education as appropriate  - Include patient/family/caregiver in decisions related to nutrition  Outcome: Progressing     Problem: MUSCULOSKELETAL - ADULT  Goal: Maintain or return mobility to safest level of function  Description: INTERVENTIONS:  - Assess patient's ability to carry out ADLs; assess patient's baseline for ADL function and identify physical deficits which impact ability to perform ADLs (bathing, care of mouth/teeth, toileting, grooming, dressing, etc.)  - Assess/evaluate cause of self-care deficits   - Assess range of motion  - Assess patient's mobility  - Assess patient's need for assistive devices and provide as appropriate  - Encourage maximum independence but intervene and supervise when necessary  - Involve family in performance of ADLs  - Assess for home care needs following discharge   - Consider OT consult to assist with ADL evaluation and planning for discharge  - Provide patient education as appropriate  Outcome: Progressing

## 2023-11-12 NOTE — ASSESSMENT & PLAN NOTE
- Patient with chronic history of atrial fibrillation with adequate heart rate control at this time. - Continue current medication regimen. - Plan to hold antiplatelet and anticoagulant medications due to multiple contusions/hematomas. - Monitor for adequate heart rate control.  - Resume home medication therapy on discharge as appropriate. - Outpatient follow-up per routine.

## 2023-11-12 NOTE — ASSESSMENT & PLAN NOTE
- Patient with multiple contusions and hematomas, present on presentation.   - Contusions are noted to be over the right shoulder and proximal upper arm, over the right hip and proximal right thigh and over the mid right anterior lower leg. - There was no evidence of active or ongoing bleeding on clinical exam/evaluation or on CT imaging.  - Hold all antiplatelet and anticoagulant medications. - If patient develops evidence of active or ongoing bleeding, will need to check TEG global with hemolysis and platelet mapping as patient does chronically take Effient and Xarelto. - Monitor contusions and overlying skin. - Monitor hemodynamic status. - Monitor hemoglobin every 8 hours. - Multimodal analgesia as needed. - May remain weightbearing as tolerated on the left upper extremity and bilateral lower extremities; will be nonweightbearing on the right upper extremity due to associated right fifth metacarpal fracture. - PT and OT evaluation and treatment as indicated. - Case management consulted for disposition planning.

## 2023-11-12 NOTE — ASSESSMENT & PLAN NOTE
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 - Monitor volume status. - Continue home medication regimen as appropriate. - Outpatient follow-up per routine.

## 2023-11-12 NOTE — H&P
[address] [facility]  H&P  Name: Zohreh Hernandez 79 y.o. female I MRN: [id number]  Unit/Bed#: ED-20 I Date of Admission: 11/12/2023   Date of Service: 11/12/2023 I Hospital Day: 0      Assessment/Plan   Fall  Assessment & Plan  - Status post mechanical fall with the below noted injuries. - Fall precautions. - Geriatric Medicine consultation for evaluation, medication review and recommendations.  - PT and OT evaluation and treatment as indicated. - Case Management consultation for disposition planning. * Multiple contusions  Assessment & Plan  - Patient with multiple contusions and hematomas, present on presentation.   - Contusions are noted to be over the right shoulder and proximal upper arm, over the right hip and proximal right thigh and over the mid right anterior lower leg. - There was no evidence of active or ongoing bleeding on clinical exam/evaluation or on CT imaging.  - Hold all antiplatelet and anticoagulant medications. - If patient develops evidence of active or ongoing bleeding, will need to check TEG global with hemolysis and platelet mapping as patient does chronically take Effient and Xarelto. - Monitor contusions and overlying skin. - Monitor hemodynamic status. - Monitor hemoglobin every 8 hours. - Multimodal analgesia as needed. - May remain weightbearing as tolerated on the left upper extremity and bilateral lower extremities; will be nonweightbearing on the right upper extremity due to associated right fifth metacarpal fracture. - PT and OT evaluation and treatment as indicated. - Case management consulted for disposition planning. Mild cognitive impairment  Assessment & Plan  - Patient with mild cognitive impairment, present on presentation.  - Geriatric medicine consultation to assist with evaluation and management. - Outpatient follow-up per routine.     CAD (coronary artery disease)  Assessment & Plan  - Patient with chronic history of CAD without evidence of acute issue.  - Continue current medication regimen. - Plan to hold antiplatelet and anticoagulant medications due to multiple contusions/hematomas. - Monitor for adequate heart rate control.  - Resume home medication therapy on discharge as appropriate. - Outpatient follow-up per routine. A-fib [facility])  Assessment & Plan  - Patient with chronic history of atrial fibrillation with adequate heart rate control at this time. - Continue current medication regimen. - Plan to hold antiplatelet and anticoagulant medications due to multiple contusions/hematomas. - Monitor for adequate heart rate control.  - Resume home medication therapy on discharge as appropriate. - Outpatient follow-up per routine. 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 - Monitor volume status. - Continue home medication regimen as appropriate. - Outpatient follow-up per routine. Essential hypertension  Assessment & Plan  - Patient with chronic history of hypertension.  - Continue current medication regimen and resume home medication therapy and discharge as appropriate. - Outpatient follow-up per routine. Trauma Alert: Evaluation; trauma team notified at 2:21 PM via text, arrived at 2:46 PM    Model of Arrival: Ambulance    Trauma Team: Attending Dr. Elroy Tabor and HERBERT Nicole PA-C  Consultants:     Orthopedics: routine consult; Epic consult order placed; History of Present Illness     Chief Complaint: "My right side hurts."  Mechanism:Fall     HPI:    Daly Miranda is a 79 y.o. female who presents with pain at multiple sites on her right side.   She noted that she fell over a concrete barrier while getting out of a car at [employer] earlier this morning. She denied hitting her head or losing consciousness. She did fall onto her right hand and shoulder as well as striking her right hip and lower leg. She is having pain in all of the sites and notes some bleeding from a cut on her right hand. She notes the most significant pain is in her right hand and her right lower leg/shin. She denies any headaches, visual changes, dizziness or lightheadedness, neck or back pain, chest pain, abdominal pain or nausea/vomiting. She denied any preceding symptoms and noted the fall was purely mechanical.    Review of Systems   Constitutional: Negative. Negative for activity change, appetite change, chills, diaphoresis and fever. HENT: Negative. Negative for ear pain and facial swelling. Eyes: Negative. Negative for photophobia, pain and visual disturbance. Respiratory: Negative. Negative for cough, chest tightness, shortness of breath and wheezing. Cardiovascular: Negative. Negative for chest pain, palpitations and leg swelling. Gastrointestinal: Negative. Negative for abdominal distention, abdominal pain, nausea and vomiting. Endocrine: Negative. Genitourinary: Negative. Negative for difficulty urinating, dysuria, flank pain, frequency and hematuria. Musculoskeletal:  Positive for arthralgias (Right shoulder, right hand, right hip and right shin pain). Negative for back pain, myalgias and neck pain. Skin:  Positive for wound (Cut on right hand). Negative for color change, pallor and rash. Allergic/Immunologic: Negative. Neurological: Negative. Negative for dizziness, syncope, weakness, light-headedness, numbness and headaches. Hematological: Negative. Psychiatric/Behavioral: Negative. Negative for agitation, confusion and self-injury. The patient is not nervous/anxious. All other systems reviewed and are negative.     12-point, complete review of systems was reviewed and negative except as stated above. Historical Information     Past Medical History:   Diagnosis Date    Anal fissure     Cardiac disorder     Cognitive changes 12/23/2020    Esophageal reflux     Esophagitis, reflux     Hemorrhoids     Hepatic hemangioma     Last Assessed: 1/13/2015    Herpes zoster     History of colonic polyps     Hypertension     Ischemic colitis ([address])     Lumbar herniated disc     Malignant neoplasm without specification of site ([address])     Nephrolithiasis     L. Lithotripsy    Nontoxic single thyroid nodule     Last Assessed: 1/13/2015    Osteoarthritis     Overactive bladder     Raynaud disease     Respiratory system disease     Sjogren's disease ([address])     Spinal stenosis     PONCHO (stress urinary incontinence, female)     Uterovaginal prolapse     Grade I-II     Past Surgical History:   Procedure Laterality Date    APPENDECTOMY  1947    CARDIAC PACEMAKER PLACEMENT  01/2021    CARDIAC SURGERY      CABG    CATARACT EXTRACTION Bilateral     COLONOSCOPY  2012    Fiberoptic    COLONOSCOPY      Resolved: 2006 - 2012 5 year f/u    CORONARY ANGIOPLASTY WITH STENT PLACEMENT      CORONARY ARTERY BYPASS GRAFT      Resolved: 2012    ESOPHAGOGASTRODUODENOSCOPY  2012    Diagnostic    HEMORROIDECTOMY      KNEE SURGERY      LITHOTRIPSY      Renal    MALIGNANT SKIN LESION EXCISION      Face; Resolved: 2004    NH ESOPHAGOGASTRODUODENOSCOPY TRANSORAL DIAGNOSTIC N/A 4/13/2016    Procedure: EGD AND COLONOSCOPY;  Surgeon: Pheobe Heimlich, MD;  Location: [facility]; Service: Gastroenterology    RENAL ARTERY STENT      SKIN LESION EXCISION      Scalp    SOFT TISSUE TUMOR RESECTION      Shoulder; Resolved: 1995    THROMBOLYSIS      Postoperative Thrombolysis PTCA    TONSILLECTOMY      Resolved: 1944        Social History     Tobacco Use    Smoking status: Never    Smokeless tobacco: Never   Vaping Use    Vaping Use: Never used   Substance Use Topics    Alcohol use:  Yes     Alcohol/week: 1.0 standard drink of alcohol Types: 1 Glasses of wine per week     Comment: occasionally, but no alcohol intake recently    Drug use: Never     Immunization History   Administered Date(s) Administered    COVID-19 MODERNA VACC 0.5 ML IM 03/30/2021, 04/27/2021, 11/02/2021, 04/06/2022    COVID-19 Moderna Vac BIVALENT 12 Yr+ IM 0.5 ML 02/25/2023    INFLUENZA 10/18/2022    Influenza Split High Dose Preservative Free IM 10/20/2014, 10/26/2016    Influenza, high dose seasonal 0.7 mL 01/04/2019, 01/27/2020, 10/22/2020, 10/08/2021, 10/18/2022    Pneumococcal Conjugate 13-Valent 06/21/2017    Pneumococcal Polysaccharide PPV23 12/07/2006    Tdap 10/04/2022    Tuberculin Skin Test-PPD Intradermal 02/15/2021     Last Tetanus: Unknown, but appears to be up-to-date based on chart review with documentation of Tdap administration in 2022. Family History: Non-contributory    1. Before the illness or injury that brought you to the Emergency, did you need someone to help you on a regular basis? 0=No   2. Since the illness or injury that brought you to the Emergency, have you needed more help than usual to take care of yourself? 1=Yes   3. Have you been hospitalized for one or more nights during the past 6 months (excluding a stay in the Emergency Department)? 0=No   4. In general, do you see well? 0=Yes   5. In general, do you have serious problems with your memory? 0=No   6. Do you take more than three different medications everyday?  1=Yes   TOTAL   2     Did you order a geriatric consult if the score was 2 or greater?: yes     Meds/Allergies   all current active meds have been reviewed and current meds:   Current Facility-Administered Medications   Medication Dose Route Frequency    acetaminophen (TYLENOL) tablet 975 mg  975 mg Oral Q8H    docusate sodium (COLACE) capsule 100 mg  100 mg Oral BID    neomycin-bacitracin-polymyxin b (NEOSPORIN) ointment 1 small application  1 small application Topical Once    ondansetron (ZOFRAN) injection 4 mg  4 mg Intravenous Q4H PRN    oxyCODONE (ROXICODONE) IR tablet 5 mg  5 mg Oral Q6H PRN    oxyCODONE (ROXICODONE) split tablet 2.5 mg  2.5 mg Oral Q6H PRN    senna (SENOKOT) tablet 17.2 mg  2 tablet Oral Daily        Allergies   Allergen Reactions    Sulfa Antibiotics Anaphylaxis    Formaldehyde     Codeine Palpitations       Objective   Initial Vitals:   Temperature: 97.8 °F (36.6 °C) (11/12/23 1124)  Pulse: 94 (11/12/23 1124)  Respirations: 20 (11/12/23 1124)  Blood Pressure: 113/58 (11/12/23 1124)    Primary Survey:   Airway:        Status: patent;        Pre-hospital Interventions: none        Hospital Interventions: none  Breathing:        Pre-hospital Interventions: none       Effort: normal       Right breath sounds: normal       Left breath sounds: normal  Circulation:        Rhythm: regular       Rate: regular   Right Pulses Left Pulses    R radial: 2+  R femoral: 2+  R pedal: 2+  R carotid: 2+  R popliteal: 2+ L radial: 2+  L femoral: 2+  L pedal: 2+  L carotid: 2+  L popliteal: 2+   Disability:        GCS: Eye: 4; Verbal: 5 Motor: 6 Total: 15       Right Pupil: round;  reactive         Left Pupil:  round;  reactive      R Motor Strength L Motor Strength    R : 5/5  R dorsiflex: 5/5  R plantarflex: 5/5 L : 5/5  L dorsiflex: 5/5  L plantarflex: 5/5        Sensory:  No sensory deficit  Exposure:       Completed: Yes      Secondary Survey:  Physical Exam  Vitals and nursing note reviewed. Exam conducted with a chaperone present. Constitutional:       General: She is awake. She is not in acute distress. Appearance: Normal appearance. She is not ill-appearing, toxic-appearing or diaphoretic. Interventions: She is not intubated. HENT:      Head: Normocephalic and atraumatic. No abrasion, contusion or laceration. Jaw: There is normal jaw occlusion. Right Ear: Hearing and external ear normal. No swelling or tenderness. Left Ear: Hearing and external ear normal. No swelling or tenderness.       Nose: Nose normal. No nasal deformity, septal deviation, signs of injury, laceration or nasal tenderness. Mouth/Throat:      Mouth: Mucous membranes are moist.      Pharynx: Oropharynx is clear. Eyes:      General: Lids are normal. Vision grossly intact. Extraocular Movements: Extraocular movements intact. Conjunctiva/sclera: Conjunctivae normal.      Pupils: Pupils are equal, round, and reactive to light. Cardiovascular:      Rate and Rhythm: Normal rate and regular rhythm. Pulses: Normal pulses. Carotid pulses are 2+ on the right side and 2+ on the left side. Radial pulses are 2+ on the right side and 2+ on the left side. Femoral pulses are 2+ on the right side and 2+ on the left side. Dorsalis pedis pulses are 2+ on the right side and 2+ on the left side. Heart sounds: Normal heart sounds. No murmur heard. No friction rub. No gallop. Pulmonary:      Effort: Pulmonary effort is normal. No tachypnea, bradypnea, accessory muscle usage, prolonged expiration, respiratory distress or retractions. She is not intubated. Breath sounds: Normal breath sounds and air entry. No stridor or decreased air movement. No decreased breath sounds, wheezing, rhonchi or rales. Chest:      Chest wall: No lacerations, deformity, swelling, tenderness or crepitus. Abdominal:      General: Abdomen is flat. Bowel sounds are normal. There is no distension. Palpations: Abdomen is soft. Tenderness: There is no abdominal tenderness. There is no guarding or rebound. Musculoskeletal:         General: No deformity. Right shoulder: Swelling and tenderness (Tenderness over the right shoulder and proximal upper arm at site of contusion/hematoma with overlying ecchymosis) present. No deformity. Decreased range of motion (Somewhat decreased range of motion of the right shoulder secondary to pain).       Right hand: Swelling and tenderness (Tenderness over the right fifth metacarpal) present. No deformity. Decreased range of motion (Decreased range of motion in the hand and wrist secondary to pain). Cervical back: Normal range of motion and neck supple. No swelling, deformity, lacerations or tenderness. No pain with movement, spinous process tenderness or muscular tenderness. Normal range of motion. Thoracic back: No swelling, deformity, lacerations or tenderness. Lumbar back: No swelling, deformity, lacerations or tenderness. Right hip: Tenderness (Tenderness over right lateral hip at site of hematoma with overlying ecchymosis) present. No deformity. Normal range of motion (Range of motion intact at the right hip, but there was some mild pain). Right lower leg: Swelling and tenderness (Significant tenderness over the right anterior lower leg at site of hematoma with overlying ecchymosis) present. No deformity or lacerations. No edema. Left lower leg: No edema. Comments: No midline cervical, thoracic or lumbar spine tenderness, step-offs or deformities. No paraspinal muscular tenderness in the neck or back. Normal range of motion in all 4 extremities without pain, tenderness or deformity. Skin:     General: Skin is warm and dry. Capillary Refill: Capillary refill takes less than 2 seconds. Coloration: Skin is not jaundiced or pale. Findings: Ecchymosis (Multiple areas of ecchymosis/hematomas including over the right shoulder and proximal upper arm, right hip and proximal right lateral thigh, and anterior right lower leg) and wound (Small superficial laceration on the right wrist as well as 1.5 cm laceration on the palmar aspect of the hand at the base of the right small finger) present. No abrasion, erythema, laceration, lesion or rash. Neurological:      General: No focal deficit present. Mental Status: She is alert and oriented to person, place, and time. Mental status is at baseline. GCS: GCS eye subscore is 4. GCS verbal subscore is 5. GCS motor subscore is 6. Sensory: Sensation is intact. No sensory deficit. Motor: Motor function is intact. No weakness. Psychiatric:         Mood and Affect: Mood normal.         Behavior: Behavior is cooperative. Invasive Devices       Peripheral Intravenous Line  Duration             Peripheral IV 11/12/23 Left;Ventral (anterior) Forearm <1 day                  Lab Results: I have personally reviewed all pertinent laboratory/test results from 11/12/23, including the preceding 24 hours. Recent Labs     11/12/23  1150   WBC 7.41   HGB 12.9   HCT 40.2      SODIUM 141   K 3.6      CO2 28   BUN 17   CREATININE 1.10   GLUC 89   AST 20   ALT 14   ALB 4.1   TBILI 1.23*   ALKPHOS 74   PTT 34   INR 1.39*       Imaging Results: I have personally reviewed pertinent images saved in PACS. CT scan findings (and other pertinent positive findings on images) were discussed with radiology. My interpretation of the images/reports are as follows:  Chest Xray(s): N/A   FAST exam(s): N/A   CT Scan(s): positive for acute findings: Soft tissue contusions lateral to the right shoulder and right hip without underlying fracture. Small right pleural effusion with partial loculation in the fissure. Additional Xray(s): X-rays with final interpretation of the right shoulder, right femur, right tibia and fibula, right hand and right wrist are pending. On review of these imaging studies by me, there appears to be a fracture of the right fifth metacarpal at the base.      Other Studies: N/A    Code Status: Level 1 - Full Code  Advance Directive and Living Will: Yes    Power of :    POLST:    I have spent 40 minutes with Patient and family today in which greater than 50% of this time was spent in counseling/coordination of care regarding Diagnostic results, Instructions for management, Patient and family education, Impressions, Counseling / Coordination of care, Documenting in the medical record, Reviewing / ordering tests, medicine, procedures  , Obtaining or reviewing history  , and Communicating with other healthcare professionals .

## 2023-11-12 NOTE — ASSESSMENT & PLAN NOTE
- Patient with chronic history of hypertension.  - Continue current medication regimen and resume home medication therapy and discharge as appropriate. - Outpatient follow-up per routine.

## 2023-11-12 NOTE — PROGRESS NOTES
Patient's  came to window asking for assistance for patient that is unable to get out of private vehicle. Patient fell approximately 30 minutes prior to arrival. Patient states she was getting out of vehicle and tripped over barrier causing her to land on right hand, wrist, hip, and leg. Large hematoma noted to right lower leg and patient unable to bear weight. Patient denies head strike, loss of consciousness, but does take xarelto daily. 911 called. Patient placed in gown and in stretcher. GCS 15. Patient alert and oriented, answering questions appropriately. Pressure dressing applied to right hand due to skin tear that was bleeding. Bleeding controlled at this time. 1050- EMS arrived and patient in route to the closest appropriate facility for further evaluation.

## 2023-11-13 ENCOUNTER — APPOINTMENT (OUTPATIENT)
Dept: RADIOLOGY | Facility: HOSPITAL | Age: 83
End: 2023-11-13
Payer: MEDICARE

## 2023-11-13 VITALS
DIASTOLIC BLOOD PRESSURE: 53 MMHG | TEMPERATURE: 97.9 F | OXYGEN SATURATION: 95 % | HEART RATE: 74 BPM | RESPIRATION RATE: 17 BRPM | SYSTOLIC BLOOD PRESSURE: 145 MMHG

## 2023-11-13 PROBLEM — S62.306A CLOSED FRACTURE OF FIFTH METACARPAL BONE OF RIGHT HAND: Status: ACTIVE | Noted: 2023-11-13

## 2023-11-13 LAB
ANION GAP SERPL CALCULATED.3IONS-SCNC: 8 MMOL/L
BUN SERPL-MCNC: 16 MG/DL (ref 5–25)
CALCIUM SERPL-MCNC: 8.8 MG/DL (ref 8.4–10.2)
CHLORIDE SERPL-SCNC: 106 MMOL/L (ref 96–108)
CO2 SERPL-SCNC: 25 MMOL/L (ref 21–32)
CREAT SERPL-MCNC: 1.02 MG/DL (ref 0.6–1.3)
ERYTHROCYTE [DISTWIDTH] IN BLOOD BY AUTOMATED COUNT: 14.1 % (ref 11.6–15.1)
GFR SERPL CREATININE-BSD FRML MDRD: 50 ML/MIN/1.73SQ M
GLUCOSE P FAST SERPL-MCNC: 102 MG/DL (ref 65–99)
GLUCOSE SERPL-MCNC: 102 MG/DL (ref 65–140)
HCT VFR BLD AUTO: 36 % (ref 34.8–46.1)
HCT VFR BLD AUTO: 36.2 % (ref 34.8–46.1)
HCT VFR BLD AUTO: 36.6 % (ref 34.8–46.1)
HGB BLD-MCNC: 11.6 G/DL (ref 11.5–15.4)
HGB BLD-MCNC: 11.6 G/DL (ref 11.5–15.4)
HGB BLD-MCNC: 11.8 G/DL (ref 11.5–15.4)
MCH RBC QN AUTO: 30.1 PG (ref 26.8–34.3)
MCHC RBC AUTO-ENTMCNC: 32.8 G/DL (ref 31.4–37.4)
MCV RBC AUTO: 92 FL (ref 82–98)
PLATELET # BLD AUTO: 155 THOUSANDS/UL (ref 149–390)
PMV BLD AUTO: 11.1 FL (ref 8.9–12.7)
POTASSIUM SERPL-SCNC: 3.7 MMOL/L (ref 3.5–5.3)
RBC # BLD AUTO: 3.92 MILLION/UL (ref 3.81–5.12)
SODIUM SERPL-SCNC: 139 MMOL/L (ref 135–147)
WBC # BLD AUTO: 7 THOUSAND/UL (ref 4.31–10.16)

## 2023-11-13 PROCEDURE — 85014 HEMATOCRIT: CPT | Performed by: PHYSICIAN ASSISTANT

## 2023-11-13 PROCEDURE — 97167 OT EVAL HIGH COMPLEX 60 MIN: CPT

## 2023-11-13 PROCEDURE — 99238 HOSP IP/OBS DSCHRG MGMT 30/<: CPT | Performed by: EMERGENCY MEDICINE

## 2023-11-13 PROCEDURE — 99204 OFFICE O/P NEW MOD 45 MIN: CPT | Performed by: ORTHOPAEDIC SURGERY

## 2023-11-13 PROCEDURE — 26600 TREAT METACARPAL FRACTURE: CPT | Performed by: ORTHOPAEDIC SURGERY

## 2023-11-13 PROCEDURE — 97163 PT EVAL HIGH COMPLEX 45 MIN: CPT

## 2023-11-13 PROCEDURE — 80048 BASIC METABOLIC PNL TOTAL CA: CPT | Performed by: PHYSICIAN ASSISTANT

## 2023-11-13 PROCEDURE — 73610 X-RAY EXAM OF ANKLE: CPT

## 2023-11-13 PROCEDURE — 97116 GAIT TRAINING THERAPY: CPT

## 2023-11-13 PROCEDURE — 99205 OFFICE O/P NEW HI 60 MIN: CPT | Performed by: NURSE PRACTITIONER

## 2023-11-13 PROCEDURE — 85027 COMPLETE CBC AUTOMATED: CPT | Performed by: PHYSICIAN ASSISTANT

## 2023-11-13 PROCEDURE — NC001 PR NO CHARGE: Performed by: EMERGENCY MEDICINE

## 2023-11-13 PROCEDURE — 85018 HEMOGLOBIN: CPT | Performed by: PHYSICIAN ASSISTANT

## 2023-11-13 RX ORDER — ACETAMINOPHEN 325 MG/1
650 TABLET ORAL EVERY 4 HOURS PRN
Refills: 0
Start: 2023-11-13

## 2023-11-13 RX ORDER — SENNOSIDES 8.6 MG
17.2 TABLET ORAL DAILY
Qty: 14 TABLET | Refills: 0 | Status: SHIPPED | OUTPATIENT
Start: 2023-11-14 | End: 2023-11-21

## 2023-11-13 RX ORDER — POLYETHYLENE GLYCOL 3350 17 G/17G
17 POWDER, FOR SOLUTION ORAL DAILY PRN
Status: DISCONTINUED | OUTPATIENT
Start: 2023-11-13 | End: 2023-11-13 | Stop reason: HOSPADM

## 2023-11-13 RX ORDER — POLYETHYLENE GLYCOL 3350 17 G/17G
17 POWDER, FOR SOLUTION ORAL DAILY PRN
Qty: 85 G | Refills: 0 | Status: SHIPPED | OUTPATIENT
Start: 2023-11-13 | End: 2023-11-18

## 2023-11-13 RX ORDER — HEPARIN SODIUM 5000 [USP'U]/ML
5000 INJECTION, SOLUTION INTRAVENOUS; SUBCUTANEOUS EVERY 8 HOURS SCHEDULED
Status: DISCONTINUED | OUTPATIENT
Start: 2023-11-13 | End: 2023-11-13 | Stop reason: HOSPADM

## 2023-11-13 RX ORDER — DOCUSATE SODIUM 100 MG/1
100 CAPSULE, LIQUID FILLED ORAL 2 TIMES DAILY
Qty: 14 CAPSULE | Refills: 0 | Status: SHIPPED | OUTPATIENT
Start: 2023-11-13 | End: 2023-11-17 | Stop reason: ALTCHOICE

## 2023-11-13 RX ORDER — OXYCODONE HYDROCHLORIDE 5 MG/1
2.5-5 TABLET ORAL EVERY 6 HOURS PRN
Qty: 12 TABLET | Refills: 0 | Status: SHIPPED | OUTPATIENT
Start: 2023-11-13 | End: 2023-11-17 | Stop reason: ALTCHOICE

## 2023-11-13 RX ADMIN — ACETAMINOPHEN 975 MG: 325 TABLET, FILM COATED ORAL at 00:03

## 2023-11-13 RX ADMIN — HEPARIN SODIUM 5000 UNITS: 5000 INJECTION INTRAVENOUS; SUBCUTANEOUS at 08:27

## 2023-11-13 RX ADMIN — SERTRALINE 25 MG: 25 TABLET, FILM COATED ORAL at 08:27

## 2023-11-13 RX ADMIN — ACETAMINOPHEN 975 MG: 325 TABLET, FILM COATED ORAL at 08:27

## 2023-11-13 RX ADMIN — SENNOSIDES 17.2 MG: 8.6 TABLET, FILM COATED ORAL at 08:27

## 2023-11-13 RX ADMIN — DOCUSATE SODIUM 100 MG: 100 CAPSULE, LIQUID FILLED ORAL at 08:27

## 2023-11-13 NOTE — ASSESSMENT & PLAN NOTE
XR R hand/wrist: Oblique fracture of R proximal 5th metacarpal.  Small superficial laceration distal to the fracture site- abx not required  Splinted in the ED 11/12  Appreciate orthopedic surgery's recommendations  Outpt orthopedic surgery follow up

## 2023-11-13 NOTE — ASSESSMENT & PLAN NOTE
- Patient with chronic history of CAD without evidence of acute issue.  - Continue current medication regimen. - Plan to hold antiplatelet and anticoagulant medications due to multiple contusions/hematomas until Friday, 11/17/2023.  - Monitor for adequate heart rate control.  - Resume home medication therapy on discharge as appropriate. - Outpatient follow-up per routine.

## 2023-11-13 NOTE — CASE MANAGEMENT
Case Management Discharge Planning Note    Patient name Susanna Desai W /W -27 MRN [id number]  : 1940 Date 2023       Current Admission Date: 2023  Current Admission Diagnosis:Multiple contusions   Patient Active Problem List    Diagnosis Date Noted    Closed fracture of fifth metacarpal bone of right hand 2023    Multiple contusions 2023    Pruritic rash 2023    Wound of right leg 2022    Fall 10/04/2022    Cervical spondylosis     Cervical disc disorder with radiculopathy of mid-cervical region     Acute (reversible) ischemia of small intestine, extent unspecified ([address]) 2022    Atherosclerosis with claudication of extremity ([address]) 2021    Carotid stenosis, asymptomatic, bilateral 2021    Mild cognitive impairment 2021    Memory loss 2021    Current use of long term anticoagulation 2021    Long term current use of amiodarone 2021    Renal artery stenosis ([address]) 2021    Pulmonary hypertension ([address]) 2021    Sick sinus syndrome ([address]) 2021    Hx of CABG 2021    CAD (coronary artery disease) 2021    Depression 2021    Seizure-like activity ([address]) 2021    Hyperlipidemia     Toxic metabolic encephalopathy     Edema of left upper extremity 2021    Urinary retention 2021    A-fib ([address]) 2021    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 [place] [address]  Phone: [phone] Fax: [phone], [address] [facility] [address] [place] [address] [facility] [place] [address] [facility] [address]  Phone: [phone] Fax: [phone]    Primary Care Provider: Mel Downing DO    Primary Insurance: MEDICARE  Secondary Insurance: Leatha Ryan    DISCHARGE DETAILS:    Discharge planning discussed with[de-identified] pt and   Freedom of Choice: Yes  Comments - Freedom of Choice: Pt and  are concerned they will not be able to get to OP therapy. They are requesting [facility] be arranged for home.  is also asking for a platform to put on her RW. CM contacted family/caregiver?: Yes  Were Treatment Team discharge recommendations reviewed with patient/caregiver?: Yes  Did patient/caregiver verbalize understanding of patient care needs?: Yes  Were patient/caregiver advised of the risks associated with not following Treatment Team discharge recommendations?: Yes    Contacts  Patient Contacts:   Relationship to Patient[de-identified] Family  Contact Method: In Person  Reason/Outcome: Continuity of Care, Emergency Contact, Referral, Discharge [facility]         Is the patient interested in [facility] at discharge?: Yes  [facility] requested[de-identified] Occupational Therapy, Physical [facility] Name[de-identified] Other  [facility] Provider[de-identified] PCP  Home Health Services Needed[de-identified] Other (comment), Strengthening/Theraputic Exercises to Improve Function, Evaluate Functional Status and Safety, Gait/ADL Training  Homebound Criteria Met[de-identified] Uses an Assist Device (i.e. cane, walker, etc)  Supporting Clincal Findings[de-identified] Limited Endurance    DME Referral Provided  Referral made for DME?: Yes  DME referral completed for the following items[de-identified] Other (platform for RW)  DME Supplier Name[de-identified] [employer]    Other Referral/Resources/Interventions Provided:  Interventions: DME  Referral Comments: Referrals have been made for DME and HHC. Platform for RW will be delivered to home. Referrals have been made for [facility].   CM will follow up to assist with DCP.             Discharge Destination Plan[de-identified] Home with [address] at Discharge : Family

## 2023-11-13 NOTE — DISCHARGE INSTR - AVS FIRST PAGE
Discharge Instructions - Orthopedics  Lam Lyon 79 y.o. female MRN: [id number]  Unit/Bed#: W -01    Weight Bearing Status:                                           Non weight bearing to the right upper extremity in splint. Pain:  Continue analgesics as directed    Dressing Instructions:   Please keep clean, dry and intact until follow up     Appt Instructions: If you do not have your appointment, please call the clinic at [phone] to schedule with Dr. Jamey Cha in 1 week  Otherwise follow up as scheduled. Contact the office sooner if you experience any increased numbness/tingling in the extremities. Trauma Discharge Instructions:    Please follow-up as instructed. Activity:  - PT and OT evaluation and treatment as indicated. - You may resume activity as tolerated. - Walking and normal light activities are encouraged. - Normal daily activities including climbing steps are okay. - No driving until no longer using pain medications. Diet:    - You may resume your normal diet. Medications:  - You should continue your current medication regimen after discharge unless otherwise instructed. Please refer to your discharge medication list for further details. - Please take the pain medications as directed. - You are encouraged to use non-narcotic pain medications first and whenever possible. Reserve the use of narcotic pain medication for moderate to severe pain not controlled by non-narcotic medications.  - No driving while taking narcotic pain medications. - You may become constipated, especially if taking pain medications. You may take any over the counter stool softeners or laxatives as needed. Examples: Milk of Magnesia, Colace, Senna.     Additional Instructions:  - May shower daily.  - If you have any questions or concerns after discharge please call the office.  - Call office or return to ER if fever greater than 101, chills, persistent nausea/vomiting, worsening/uncontrollable pain, develop productive cough, increasing shortness of breath, difficulty breathing, and/or increasing redness or purulent/foul smelling drainage from wound(s).

## 2023-11-13 NOTE — PLAN OF CARE
Problem: MOBILITY - ADULT  Goal: Maintain or return to baseline ADL function  Description: INTERVENTIONS:  -  Assess patient's ability to carry out ADLs; assess patient's baseline for ADL function and identify physical deficits which impact ability to perform ADLs (bathing, care of mouth/teeth, toileting, grooming, dressing, etc.)  - Assess/evaluate cause of self-care deficits   - Assess range of motion  - Assess patient's mobility; develop plan if impaired  - Assess patient's need for assistive devices and provide as appropriate  - Encourage maximum independence but intervene and supervise when necessary  - Involve family in performance of ADLs  - Assess for home care needs following discharge   - Consider OT consult to assist with ADL evaluation and planning for discharge  - Provide patient education as appropriate  11/12/2023 2012 by Lord Garvin  Outcome: Progressing  11/12/2023 1736 by Lord Garvin  Outcome: Progressing  Goal: Maintains/Returns to pre admission functional level  Description: INTERVENTIONS:  - Perform BMAT or MOVE assessment daily.   - Set and communicate daily mobility goal to care team and patient/family/caregiver.    - Collaborate with rehabilitation services on mobility goals if consulted  - Out of bed for toileting  - Record patient progress and toleration of activity level   11/12/2023 2012 by Lord Garvin  Outcome: Progressing  11/12/2023 1736 by Lord Garvin  Outcome: Progressing     Problem: PAIN - ADULT  Goal: Verbalizes/displays adequate comfort level or baseline comfort level  Description: Interventions:  - Encourage patient to monitor pain and request assistance  - Assess pain using appropriate pain scale  - Administer analgesics based on type and severity of pain and evaluate response  - Implement non-pharmacological measures as appropriate and evaluate response  - Consider cultural and social influences on pain and pain management  - Notify physician/advanced practitioner if interventions unsuccessful or patient reports new pain  11/12/2023 2012 by Altaf Rivera  Outcome: Progressing  11/12/2023 1736 by Altaf Rivera  Outcome: Progressing     Problem: INFECTION - ADULT  Goal: Absence or prevention of progression during hospitalization  Description: INTERVENTIONS:  - Assess and monitor for signs and symptoms of infection  - Monitor lab/diagnostic results  - Monitor all insertion sites, i.e. indwelling lines, tubes, and drains  - Monitor endotracheal if appropriate and nasal secretions for changes in amount and color  - [place] appropriate cooling/warming therapies per order  - Administer medications as ordered  - Instruct and encourage patient and family to use good hand hygiene technique  - Identify and instruct in appropriate isolation precautions for identified infection/condition  11/12/2023 2012 by Altaf Rivera  Outcome: Progressing  11/12/2023 1736 by Altaf Rivera  Outcome: Progressing  Goal: Absence of fever/infection during neutropenic period  Description: INTERVENTIONS:  - Monitor WBC    11/12/2023 2012 by Altaf Rivear  Outcome: Progressing  11/12/2023 1736 by Altaf Rivera  Outcome: Progressing     Problem: SAFETY ADULT  Goal: Maintain or return to baseline ADL function  Description: INTERVENTIONS:  -  Assess patient's ability to carry out ADLs; assess patient's baseline for ADL function and identify physical deficits which impact ability to perform ADLs (bathing, care of mouth/teeth, toileting, grooming, dressing, etc.)  - Assess/evaluate cause of self-care deficits   - Assess range of motion  - Assess patient's mobility; develop plan if impaired  - Assess patient's need for assistive devices and provide as appropriate  - Encourage maximum independence but intervene and supervise when necessary  - Involve family in performance of ADLs  - Assess for home care needs following discharge   - Consider OT consult to assist with ADL evaluation and planning for discharge  - Provide patient education as appropriate  11/12/2023 2012 by Lord Garvin  Outcome: Progressing  11/12/2023 1736 by Lord Garvin  Outcome: Progressing  Goal: Maintains/Returns to pre admission functional level  Description: INTERVENTIONS:  - Perform BMAT or MOVE assessment daily.   - Set and communicate daily mobility goal to care team and patient/family/caregiver.    - Out of bed for toileting  - Record patient progress and toleration of activity level   11/12/2023 2012 by Lord Garvin  Outcome: Progressing  11/12/2023 1736 by Lord Garvin  Outcome: Progressing  Goal: Patient will remain free of falls  Description: INTERVENTIONS:  - Educate patient/family on patient safety including physical limitations  - Instruct patient to call for assistance with activity   - Consult OT/PT to assist with strengthening/mobility   - Keep Call bell within reach  - Keep bed low and locked with side rails adjusted as appropriate  - Keep care items and personal belongings within reach  - Initiate and maintain comfort rounds  - Make Fall Risk Sign visible to staff  - Apply yellow socks and bracelet for high fall risk patients  - Consider moving patient to room near nurses station  11/12/2023 2012 by Lord Garvin  Outcome: Progressing  11/12/2023 1736 by Lord Garvin  Outcome: Progressing     Problem: DISCHARGE PLANNING  Goal: Discharge to home or other facility with appropriate resources  Description: INTERVENTIONS:  - Identify barriers to discharge w/patient and caregiver  - Arrange for needed discharge resources and transportation as appropriate  - Identify discharge learning needs (meds, wound care, etc.)  - Arrange for interpretive services to assist at discharge as needed  - Refer to Case Management Department for coordinating discharge planning if the patient needs post-hospital services based on physician/advanced practitioner order or complex needs related to functional status, cognitive ability, or social support system  11/12/2023 2012 by Ruth Painting  Outcome: Progressing  11/12/2023 1736 by Ruth Painting  Outcome: Progressing     Problem: Knowledge Deficit  Goal: Patient/family/caregiver demonstrates understanding of disease process, treatment plan, medications, and discharge instructions  Description: Complete learning assessment and assess knowledge base. Interventions:  - Provide teaching at level of understanding  - Provide teaching via preferred learning methods  11/12/2023 2012 by Ruth Painting  Outcome: Progressing  11/12/2023 1736 by Ruth Painting  Outcome: Progressing     Problem: Nutrition/Hydration-ADULT  Goal: Nutrient/Hydration intake appropriate for improving, restoring or maintaining nutritional needs  Description: Monitor and assess patient's nutrition/hydration status for malnutrition. Collaborate with interdisciplinary team and initiate plan and interventions as ordered. Monitor patient's weight and dietary intake as ordered or per policy. Utilize nutrition screening tool and intervene as necessary. Determine patient's food preferences and provide high-protein, high-caloric foods as appropriate.      INTERVENTIONS:  - Monitor oral intake, urinary output, labs, and treatment plans  - Assess nutrition and hydration status and recommend course of action  - Evaluate amount of meals eaten  - Assist patient with eating if necessary   - Allow adequate time for meals  - Recommend/ encourage appropriate diets, oral nutritional supplements, and vitamin/mineral supplements  - Order, calculate, and assess calorie counts as needed  - Recommend, monitor, and adjust tube feedings and TPN/PPN based on assessed needs  - Assess need for intravenous fluids  - Provide specific nutrition/hydration education as appropriate  - Include patient/family/caregiver in decisions related to nutrition  11/12/2023 2012 by Ruth Painting  Outcome: Progressing  11/12/2023 1736 by Ruth Painting  Outcome: Progressing     Problem: MUSCULOSKELETAL - ADULT  Goal: Maintain or return mobility to safest level of function  Description: INTERVENTIONS:  - Assess patient's ability to carry out ADLs; assess patient's baseline for ADL function and identify physical deficits which impact ability to perform ADLs (bathing, care of mouth/teeth, toileting, grooming, dressing, etc.)  - Assess/evaluate cause of self-care deficits   - Assess range of motion  - Assess patient's mobility  - Assess patient's need for assistive devices and provide as appropriate  - Encourage maximum independence but intervene and supervise when necessary  - Involve family in performance of ADLs  - Assess for home care needs following discharge   - Consider OT consult to assist with ADL evaluation and planning for discharge  - Provide patient education as appropriate  11/12/2023 2012 by Rey Washington  Outcome: Progressing  11/12/2023 1736 by Rey Washington  Outcome: Progressing

## 2023-11-13 NOTE — CONSULTS
Orthopedics   Cady Burgess 79 y.o. female MRN: [id number]  Unit/Bed#: W -01      Chief Complaint:   Right hand pain, right sided diffuse pain. HPI:  79 y.o. female community ambulator s/p mechanical fall yesterday. Her PMH includes mild cognitive impairment, hypertension, osteoarthritis, sjogrens, Afib on xarelto, heart failure, CAD, hyperlipidemia. At time of consult she endorses pain in the right shoulder, upper arm, right wrist, right leg, right ankle. She denies numbness/tingling. No left sided pain. She has been found to have a right base of the 5th metacarpal fracture, for which orthopedics has been called. No family at bedside at present. She tells me that "everything bruised hurts". Her biggest concern is an area of fluctuance/bruising/swelling over the lateral right calf. She has bruising throughout the right upper arm, and into the digits. Review of records indicate that she was splinted in the ER after a small laceration was repaired over the distal palm/MCP. Review Of Systems:   Skin: as per HPI  Neuro: See HPI  Musculoskeletal: See HPI  14 point review of systems negative except as stated above     Past Medical History:   Past Medical History:   Diagnosis Date    Anal fissure     Cardiac disorder     Cognitive changes 12/23/2020    Esophageal reflux     Esophagitis, reflux     Hemorrhoids     Hepatic hemangioma     Last Assessed: 1/13/2015    Herpes zoster     History of colonic polyps     Hypertension     Ischemic colitis ([address])     Lumbar herniated disc     Malignant neoplasm without specification of site ([address])     Nephrolithiasis     L.  Lithotripsy    Nontoxic single thyroid nodule     Last Assessed: 1/13/2015    Osteoarthritis     Overactive bladder     Raynaud disease     Respiratory system disease     Sjogren's disease ([address])     Spinal stenosis     PONCHO (stress urinary incontinence, female)     Uterovaginal prolapse     Grade I-II       Past Surgical History:   Past Surgical History:   Procedure Laterality Date    APPENDECTOMY  1947    CARDIAC PACEMAKER PLACEMENT  01/2021    CARDIAC SURGERY      CABG    CATARACT EXTRACTION Bilateral     COLONOSCOPY  2012    Fiberoptic    COLONOSCOPY      Resolved: 2006 - 2012 5 year f/u    CORONARY ANGIOPLASTY WITH STENT PLACEMENT      CORONARY ARTERY BYPASS GRAFT      Resolved: 2012    ESOPHAGOGASTRODUODENOSCOPY  2012    Diagnostic    HEMORROIDECTOMY      KNEE SURGERY      LITHOTRIPSY      Renal    MALIGNANT SKIN LESION EXCISION      Face; Resolved: 2004    IN ESOPHAGOGASTRODUODENOSCOPY TRANSORAL DIAGNOSTIC N/A 4/13/2016    Procedure: EGD AND COLONOSCOPY;  Surgeon: Curlene Bence, MD;  Location: [facility];   Service: Gastroenterology    RENAL ARTERY STENT      SKIN LESION EXCISION      Scalp    SOFT TISSUE TUMOR RESECTION      Shoulder; Resolved: 1995    THROMBOLYSIS      Postoperative Thrombolysis PTCA    TONSILLECTOMY      Resolved: 1944       Family History:  Family history reviewed and non-contributory  Family History   Problem Relation Age of Onset    Heart disease Mother     Hypertension Mother     Osteoporosis Mother     Heart disease Father     Hypertension Father     No Known Problems Sister     Heart disease Brother     Diabetes Brother     No Known Problems Daughter     No Known Problems Son     No Known Problems Maternal Grandmother     No Known Problems Maternal Grandfather     No Known Problems Paternal Grandmother     No Known Problems Paternal Grandfather     Ulcerative colitis Family     Colon polyps Family     Breast cancer Neg Hx     Colon cancer Neg Hx     Ovarian cancer Neg Hx        Social History:  Social History     Socioeconomic History    Marital status: /Civil Union     Spouse name: None    Number of children: None    Years of education: None    Highest education level: None   Occupational History    Occupation: retired   Tobacco Use    Smoking status: Never    Smokeless tobacco: Never   Vaping Use    Vaping Use: Never used   Substance and Sexual Activity    Alcohol use: Yes     Alcohol/week: 1.0 standard drink of alcohol     Types: 1 Glasses of wine per week     Comment: occasionally, but no alcohol intake recently    Drug use: Never    Sexual activity: Not Currently   Other Topics Concern    None   Social History Narrative    Activities: golf    Caffeine use    Consumes healthy diet    Daily caffeinated coffee consumption: 1 cup per day    Drinks caffeinated tea: 1 cup per day    Well balanced diet     Social Determinants of Health     Financial Resource Strain: Low Risk  (7/18/2023)    Overall Financial Resource Strain (CARDIA)     Difficulty of Paying Living Expenses: Not hard at all   Food Insecurity: Not on file   Transportation Needs: No Transportation Needs (7/18/2023)    PRAPARE - Transportation     Lack of Transportation (Medical): No     Lack of Transportation (Non-Medical): No   Physical Activity: Not on file   Stress: Not on file   Social Connections: Not on file   Intimate Partner Violence: Not on file   Housing Stability: Not on file       Allergies:    Allergies   Allergen Reactions    Sulfa Antibiotics Anaphylaxis    Formaldehyde     Codeine Palpitations           Labs:  0   Lab Value Date/Time    HCT 36.6 11/13/2023 0836    HCT 36.0 11/13/2023 0433    HCT 36.2 11/12/2023 2358    HCT 37.7 12/24/2015 0652    HCT 35.3 12/22/2015 0426    HCT 39.0 12/21/2015 0123    HGB 11.6 11/13/2023 0836    HGB 11.8 11/13/2023 0433    HGB 11.6 11/12/2023 2358    HGB 12.1 12/24/2015 0652    HGB 11.7 12/22/2015 2006    HGB 11.3 (L) 12/22/2015 0426    INR 1.39 (H) 11/12/2023 1150    INR 0.99 12/21/2015 0123    WBC 7.00 11/13/2023 0433    WBC 7.41 11/12/2023 1150    WBC 6.18 02/24/2021 0636    WBC 4.91 12/24/2015 0652    WBC 5.22 12/22/2015 0426    WBC 7.84 12/21/2015 0123       Meds:    Current Facility-Administered Medications:     acetaminophen (TYLENOL) tablet 975 mg, 975 mg, Oral, Q8H, Be Banks PA-C, 975 mg at 11/13/23 0827    atorvastatin (LIPITOR) tablet 20 mg, 20 mg, Oral, Daily With Dinner, KATHY Mckee, 20 mg at 11/12/23 1825    docusate sodium (COLACE) capsule 100 mg, 100 mg, Oral, BID, Be Banks PA-C, 100 mg at 11/13/23 0827    heparin (porcine) subcutaneous injection 5,000 Units, 5,000 Units, Subcutaneous, Q8H [address] [facility], Delisa Alegria MD, 5,000 Units at 11/13/23 0827    oxyCODONE (ROXICODONE) IR tablet 5 mg, 5 mg, Oral, Q6H PRN, Don Díaz PA-C    oxyCODONE (ROXICODONE) split tablet 2.5 mg, 2.5 mg, Oral, Q6H PRN, Don Díaz PA-C    polyethylene glycol (MIRALAX) packet 17 g, 17 g, Oral, Daily PRN, Jackelyn Segoviater, KATHY    senna (SENOKOT) tablet 17.2 mg, 2 tablet, Oral, Daily, Be Banks PA-C, 17.2 mg at 11/13/23 0827    sertraline (ZOLOFT) tablet 25 mg, 25 mg, Oral, Daily, KHADRA MckeeNP, 25 mg at 11/13/23 0827    trimethobenzamide (TIGAN) IM injection 200 mg, 200 mg, Intramuscular, Q6H PRN, Brenda Cerrato, CRNP    Blood Culture:   Lab Results   Component Value Date    BLOODCX No Growth After 5 Days. 01/30/2021       Wound Culture:   No results found for: "WOUNDCULT"    Ins and Outs:  I/O last 24 hours: In: 480 [P.O.:480]  Out: -           Physical Exam:   /56 (BP Location: Left arm)   Pulse 73   Temp 97.5 °F (36.4 °C) (Oral)   Resp 18   SpO2 93%   Gen: No acute distress, resting comfortably in bed  HEENT: Eyes clear, moist mucus membranes, hearing intact  Respiratory: No audible wheezing or stridor  Cardiovascular: Well Perfused peripherally, 2+ distal pulse  Abdomen: nondistended, no peritoneal signs  Musculoskeletal: bilateral upper extremity  Skin: splint in place to the right hand. Visible skin intact. She has bruising over the right shoulder, upper arm. And bruising/swelling into the right hand/digits. She has no ttp over the bilateral clavicles. She has some tenderness over the right shoulder over area of bruising. NO pain over left shoulder.    No pain over bilateral upper arms, elbows or forearms. Right hand/wrist in splint. No pain in the left wrist or hand. ROM full to the left upper extremity  ROM full in the right upper extremity, limited by splint. SILT m/r/u. Motor intact ain/pin/m/r/u  Digits warm and well perfused. Musculature is soft and compressible, no pain with passive stretch    Musculoskeletal: bilateral lower extremity  Skin intact. Bruising appreciated over the lateral right calf, and over the right ankle, with some associated welling over the right ankle. TTP over the right lateral malleolus. No tenderness over bilateral hips, femurs, knees, lower legs. No tenderness over the left ankle. SILT s/s/sp/dp/t. Motor intact 5/5 strength with hip flexion/extension, knee flexion/extension, ankle dorsi/plantar flexion, EHL/FHL  2+ DP/PT pulse  Musculature is soft and compressible, no pain with passive stretch  Leg lengths equal    Tertiary: no tenderness over all other joints/long bones as except already stated. Radiology:   I personally reviewed the films. Imaging reviewed and discussed with Dr. CASTRO \[facility]\"". XRAY right shoulder: no acute osseous abnormality. XRAY right femur: no acute osseous abnormality  XRAY right tib/fib: no acute osseous abnormality  XRAY right hand/wrist: fracture at the base of the 5th metacarpal.   CT c/a/p: IMPRESSION:   Soft tissue contusions lateral to the right shoulder and right hip without underlying fracture. Small right pleural effusion with partial loculationin the fissure. Gallstones without surrounding inflammation. XRAYs right ankle: no acute fracture.     _*_*_*_*_*_*_*_*_*_*_*_*_*_*_*_*_*_*_*_*_*_*_*_*_*_*_*_*_*_*_*_*_*_*_*_*_*_*_*_*_*    Assessment:  79 y. o.female s/p mechanical fall with right base of 5th metacarpal fracture, s/p splinting by ER. Plan:   NWB right upper extremity/hand in splint. [facility] for use of platform walker. No immediate orthopedic intervention indicated.    Pain control per primary team.   Medical management per primary team.   Dispo: hand surgery signing off. Patient to follow up with Dr. Vania Little in 1 week as outpatient.      Kim Chavarria PA-C

## 2023-11-13 NOTE — ASSESSMENT & PLAN NOTE
- Patient with chronic history of atrial fibrillation with adequate heart rate control at this time. - Continue current medication regimen. - Plan to hold Xarelto and Effient due to multiple contusions/hematomas until Friday, 11/17/2023.   - Heparin subq for VTE prophyaxis  - Monitor for adequate heart rate control.  - Resume home medication therapy on discharge as appropriate. - Outpatient follow-up per routine.

## 2023-11-13 NOTE — DISCHARGE SUMMARY
Discharge Summary - Trauma Service   John Salomon 79 y.o. female MRN: [id number]  Unit/Bed#: W -01 Encounter: [id number]    Fall  Assessment & Plan  - Status post mechanical fall with the below noted injuries. - Fall precautions. - Geriatric Medicine consultation for evaluation, medication review and recommendations.  - PT and OT evaluation and treatment as indicated. - Case Management consultation for disposition planning. * Multiple contusions  Assessment & Plan  - Patient with multiple contusions and hematomas, present on presentation.   - Contusions are noted to be over the right shoulder and proximal upper arm, over the right hip and proximal right thigh and over the mid right anterior lower leg. - There was no evidence of active or ongoing bleeding on clinical exam/evaluation or on CT imaging.  - Hold all antiplatelet and anticoagulant medications until Friday, 11/17/2023.  - Monitor contusions and overlying skin. - Multimodal analgesia as needed. - May remain weightbearing as tolerated on the left upper extremity and bilateral lower extremities; will be NON-weightbearing on the right upper extremity due to associated right fifth metacarpal fracture. - PT and OT evaluation and treatment as indicated. - Case management consulted for disposition planning. Closed fracture of fifth metacarpal bone of right hand  Assessment & Plan  - Acute oblique fracture of the right proximal fifth metacarpal, present on admission.  - Appreciate Orthopedic surgery evaluation, recommendations and interventions as noted. - Nonoperative management recommended. - Maintain NON-weightbearing status on the right upper extremity/hand in splint.  - Monitor right upper extremity neurovascular exam.  - Continue multimodal analgesic regimen.  - PT and OT evaluation and treatment as indicated. - Outpatient follow up with Orthopedic surgery for re-evaluation.     Mild cognitive impairment  Assessment & Plan  - Patient with mild cognitive impairment, present on presentation.  - Geriatric medicine consultation to assist with evaluation and management. - Outpatient follow-up per routine. CAD (coronary artery disease)  Assessment & Plan  - Patient with chronic history of CAD without evidence of acute issue.  - Continue current medication regimen. - Plan to hold antiplatelet and anticoagulant medications due to multiple contusions/hematomas until Friday, 11/17/2023.  - Monitor for adequate heart rate control.  - Resume home medication therapy on discharge as appropriate. - Outpatient follow-up per routine. A-fib [facility])  Assessment & Plan  - Patient with chronic history of atrial fibrillation with adequate heart rate control at this time. - Continue current medication regimen. - Plan to hold Xarelto and Effient due to multiple contusions/hematomas until Friday, 11/17/2023.   - Heparin subq for VTE prophyaxis  - Monitor for adequate heart rate control.  - Resume home medication therapy on discharge as appropriate. - Outpatient follow-up per routine. 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 - Monitor volume status. - Continue home medication regimen as appropriate. - Outpatient follow-up per routine. Essential hypertension  Assessment & Plan  - Patient with chronic history of hypertension.  - Continue current medication regimen and resume home medication therapy and discharge as appropriate. - Outpatient follow-up per routine.       Admission Date: 11/12/2023     Discharge Date: 11/13/2023    Admitting Diagnosis: Leg pain [M79.606]  Arm injury [S49.90XA]  Anticoagulated [Z79.01]  Shoulder contusion [S40.019A]  Contusion of right hip, initial encounter [S70.01XA]  Laceration of right upper extremity, initial encounter [S41.111A]  Nondisplaced fracture of base of fifth metacarpal bone, right hand, initial encounter for closed fracture [S62.346A]  Hematoma of right lower leg [S80.11XA]    Discharge Diagnosis: see above. Attending and Service: Dr. Zak Reynoso, Acute Care Surgical Services. Consulting Physician(s):     Orthopedic surgery. Geriatric medicine. Imaging and Procedures Performed:   Orders Placed This Encounter   Procedures    Splint application       XR hand 3+ views RIGHT    Result Date: 11/13/2023  Impression: Oblique fracture proximal fifth metacarpal. Workstation performed: [id number]     XR tibia fibula 2 views RIGHT    Result Date: 11/13/2023  Impression: No acute osseous abnormality. Workstation performed: [id number]     XR femur 2 views RIGHT    Result Date: 11/13/2023  Impression: No acute osseous abnormality. Workstation performed: [id number]     XR shoulder 2+ views RIGHT    Result Date: 11/13/2023  Impression: No acute osseous abnormality. Workstation performed: [id number]     XR wrist 3+ views RIGHT    Result Date: 11/13/2023  Impression: Oblique fracture proximal fifth metacarpal. Workstation performed: [id number]     CT chest abdomen pelvis w contrast    Result Date: 11/12/2023  Impression: Soft tissue contusions lateral to the right shoulder and right hip without underlying fracture. Small right pleural effusion with partial loculation in the fissure. Gallstones without surrounding inflammation. Workstation performed: [id number]     CT head without contrast    Result Date: 11/12/2023  Impression: No acute intracranial abnormality. Workstation performed: [id number]     CT cervical spine without contrast    Result Date: 11/12/2023  Impression: No cervical spine fracture or traumatic malalignment. Partially visualized right pleural effusion.  Workstation performed: [id number] Hospital Course: Mani Albarran is a 44-year-old female who presented to the emergency department following a fall over a concrete barrier while getting out of her car at [employer] [place] on her right side. She did not hit her head or lose consciousness. She complains of right sided pain including in her shoulder, hand, hip and right lower leg. During her initial evaluation by the trauma service, her primary survey is unremarkable. On secondary survey, she was afebrile with normal vital signs; she had tenderness over her right shoulder, right hip and right lower leg at sites of hematomas with intact skin and no evidence of active or ongoing bleeding. She did have pain with range of motion of her right hand as well as swelling and tenderness over the right fifth metacarpal with some ecchymosis as well and a laceration at the base of the palmar aspect of the right small finger; the remainder of her exam was unremarkable. Her initial work-up included labs and the above and imaging studies. 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 Orthopedic surgery and geriatric medicine services were consulted to assist with her work-up and management during her hospital encounter. Orthopedic surgery recommended nonoperative management for right hand fracture and maintaining nonweightbearing status in a splint with outpatient follow-up in 1 week. She had serial hemoglobin checks and monitoring of her hematomas which demonstrated no evidence of active or ongoing bleeding during her hospital encounter. She was able to have her pain managed with oral medications. PT and OT evaluated her and cleared her for discharge home with home versus outpatient therapy and use of the cane. Case management assisting with disposition planning. The patient was deemed stable for discharge on 11/13/2023. On discharge, the patient is instructed to follow-up with the patient's primary care provider to review the events of the patient's recent hospitalization. The patient is instructed to follow-up in the Trauma Clinic as scheduled on 11/30/2023 at 12:45 PM.  The patient is instructed to follow-up with Hand surgery in 1 week for reevaluation of her right hand injury. .  The patient should follow the provided discharge instructions. Condition at Discharge: good     Discharge instructions/Information to patient and family:   See after visit summary for information provided to patient and family. Provisions for Follow-Up Care:  See after visit summary for information related to follow-up care and any pertinent home health orders. Disposition: See After Visit Summary for discharge disposition information. Planned Readmission: No    Discharge Statement   I spent 25 minutes discharging the patient. This time was spent on the day of discharge. I had direct contact with the patient on the day of discharge. Additional documentation is required if more than 30 minutes were spent on discharge. Discharge Medications:  See after visit summary for reconciled discharge medications provided to patient and family.       Kamran Clements PA-C  11/13/2023  3:57 PM

## 2023-11-13 NOTE — PLAN OF CARE
Problem: MOBILITY - ADULT  Goal: Maintain or return to baseline ADL function  Description: INTERVENTIONS:  -  Assess patient's ability to carry out ADLs; assess patient's baseline for ADL function and identify physical deficits which impact ability to perform ADLs (bathing, care of mouth/teeth, toileting, grooming, dressing, etc.)  - Assess/evaluate cause of self-care deficits   - Assess range of motion  - Assess patient's mobility; develop plan if impaired  - Assess patient's need for assistive devices and provide as appropriate  - Encourage maximum independence but intervene and supervise when necessary  - Involve family in performance of ADLs  - Assess for home care needs following discharge   - Consider OT consult to assist with ADL evaluation and planning for discharge  - Provide patient education as appropriate  Outcome: Progressing  Goal: Maintains/Returns to pre admission functional level  Description: INTERVENTIONS:  - Perform BMAT or MOVE assessment daily.   - Set and communicate daily mobility goal to care team and patient/family/caregiver. - Collaborate with rehabilitation services on mobility goals if consulted  - Perform Range of Motion  times a day. - Reposition patient every  hours.   - Dangle patient times a day  - Stand patient  times a day  - Ambulate patient times a day  - Out of bed to chair  times a day   - Out of bed for meals times a day  - Out of bed for toileting  - Record patient progress and toleration of activity level   Outcome: Progressing     Problem: PAIN - ADULT  Goal: Verbalizes/displays adequate comfort level or baseline comfort level  Description: Interventions:  - Encourage patient to monitor pain and request assistance  - Assess pain using appropriate pain scale  - Administer analgesics based on type and severity of pain and evaluate response  - Implement non-pharmacological measures as appropriate and evaluate response  - Consider cultural and social influences on pain and pain management  - Notify physician/advanced practitioner if interventions unsuccessful or patient reports new pain  Outcome: Progressing     Problem: INFECTION - ADULT  Goal: Absence or prevention of progression during hospitalization  Description: INTERVENTIONS:  - Assess and monitor for signs and symptoms of infection  - Monitor lab/diagnostic results  - Monitor all insertion sites, i.e. indwelling lines, tubes, and drains  - Monitor endotracheal if appropriate and nasal secretions for changes in amount and color  - [place] appropriate cooling/warming therapies per order  - Administer medications as ordered  - Instruct and encourage patient and family to use good hand hygiene technique  - Identify and instruct in appropriate isolation precautions for identified infection/condition  Outcome: Progressing  Goal: Absence of fever/infection during neutropenic period  Description: INTERVENTIONS:  - Monitor WBC    Outcome: Progressing     Problem: SAFETY ADULT  Goal: Maintain or return to baseline ADL function  Description: INTERVENTIONS:  -  Assess patient's ability to carry out ADLs; assess patient's baseline for ADL function and identify physical deficits which impact ability to perform ADLs (bathing, care of mouth/teeth, toileting, grooming, dressing, etc.)  - Assess/evaluate cause of self-care deficits   - Assess range of motion  - Assess patient's mobility; develop plan if impaired  - Assess patient's need for assistive devices and provide as appropriate  - Encourage maximum independence but intervene and supervise when necessary  - Involve family in performance of ADLs  - Assess for home care needs following discharge   - Consider OT consult to assist with ADL evaluation and planning for discharge  - Provide patient education as appropriate  Outcome: Progressing  Goal: Maintains/Returns to pre admission functional level  Description: INTERVENTIONS:  - Perform BMAT or MOVE assessment daily.   - Set and communicate daily mobility goal to care team and patient/family/caregiver. - Collaborate with rehabilitation services on mobility goals if consulted  - Perform Range of Motion  times a day. - Reposition patient every  hours.   - Dangle patient times a day  - Stand patient  times a day  - Ambulate patient times a day  - Out of bed to chair  times a day   - Out of bed for meals times a day  - Out of bed for toileting  - Record patient progress and toleration of activity level   Outcome: Progressing  Goal: Patient will remain free of falls  Description: INTERVENTIONS:  - Educate patient/family on patient safety including physical limitations  - Instruct patient to call for assistance with activity   - Consult OT/PT to assist with strengthening/mobility   - Keep Call bell within reach  - Keep bed low and locked with side rails adjusted as appropriate  - Keep care items and personal belongings within reach  - Initiate and maintain comfort rounds  - Make Fall Risk Sign visible to staff  - Offer Toileting every  Hours, in advance of need  - Initiate/Maintain alarm  - Obtain necessary fall risk management equipment  - Apply yellow socks and bracelet for high fall risk patients  - Consider moving patient to room near nurses station  Outcome: Progressing     Problem: DISCHARGE PLANNING  Goal: Discharge to home or other facility with appropriate resources  Description: INTERVENTIONS:  - Identify barriers to discharge w/patient and caregiver  - Arrange for needed discharge resources and transportation as appropriate  - Identify discharge learning needs (meds, wound care, etc.)  - Arrange for interpretive services to assist at discharge as needed  - Refer to Case Management Department for coordinating discharge planning if the patient needs post-hospital services based on physician/advanced practitioner order or complex needs related to functional status, cognitive ability, or social support system  Outcome: Progressing Problem: Knowledge Deficit  Goal: Patient/family/caregiver demonstrates understanding of disease process, treatment plan, medications, and discharge instructions  Description: Complete learning assessment and assess knowledge base. Interventions:  - Provide teaching at level of understanding  - Provide teaching via preferred learning methods  Outcome: Progressing     Problem: Nutrition/Hydration-ADULT  Goal: Nutrient/Hydration intake appropriate for improving, restoring or maintaining nutritional needs  Description: Monitor and assess patient's nutrition/hydration status for malnutrition. Collaborate with interdisciplinary team and initiate plan and interventions as ordered. Monitor patient's weight and dietary intake as ordered or per policy. Utilize nutrition screening tool and intervene as necessary. Determine patient's food preferences and provide high-protein, high-caloric foods as appropriate.      INTERVENTIONS:  - Monitor oral intake, urinary output, labs, and treatment plans  - Assess nutrition and hydration status and recommend course of action  - Evaluate amount of meals eaten  - Assist patient with eating if necessary   - Allow adequate time for meals  - Recommend/ encourage appropriate diets, oral nutritional supplements, and vitamin/mineral supplements  - Order, calculate, and assess calorie counts as needed  - Recommend, monitor, and adjust tube feedings and TPN/PPN based on assessed needs  - Assess need for intravenous fluids  - Provide specific nutrition/hydration education as appropriate  - Include patient/family/caregiver in decisions related to nutrition  Outcome: Progressing     Problem: MUSCULOSKELETAL - ADULT  Goal: Maintain or return mobility to safest level of function  Description: INTERVENTIONS:  - Assess patient's ability to carry out ADLs; assess patient's baseline for ADL function and identify physical deficits which impact ability to perform ADLs (bathing, care of mouth/teeth, toileting, grooming, dressing, etc.)  - Assess/evaluate cause of self-care deficits   - Assess range of motion  - Assess patient's mobility  - Assess patient's need for assistive devices and provide as appropriate  - Encourage maximum independence but intervene and supervise when necessary  - Involve family in performance of ADLs  - Assess for home care needs following discharge   - Consider OT consult to assist with ADL evaluation and planning for discharge  - Provide patient education as appropriate  Outcome: Progressing

## 2023-11-13 NOTE — OCCUPATIONAL THERAPY NOTE
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)    A-fib (HCC)    CAD (coronary artery disease)    Mild cognitive impairment    Fall    Closed fracture of fifth metacarpal bone of right hand    Past Medical History  Past Medical History:   Diagnosis Date    Anal fissure     Cardiac disorder     Cognitive changes 12/23/2020    Esophageal reflux     Esophagitis, reflux     Hemorrhoids     Hepatic hemangioma     Last Assessed: 1/13/2015    Herpes zoster     History of colonic polyps     Hypertension     Ischemic colitis ([address])     Lumbar herniated disc     Malignant neoplasm without specification of site ([address])     Nephrolithiasis     L. Lithotripsy    Nontoxic single thyroid nodule     Last Assessed: 1/13/2015    Osteoarthritis     Overactive bladder     Raynaud disease     Respiratory system disease     Sjogren's disease ([address])     Spinal stenosis     PONCHO (stress urinary incontinence, female)     Uterovaginal prolapse     Grade I-II     Past Surgical History  Past Surgical History:   Procedure Laterality Date    APPENDECTOMY  1947    CARDIAC PACEMAKER PLACEMENT  01/2021    CARDIAC SURGERY      CABG    CATARACT EXTRACTION Bilateral     COLONOSCOPY  2012    Fiberoptic    COLONOSCOPY      Resolved: 2006 - 2012 5 year f/u    CORONARY ANGIOPLASTY WITH STENT PLACEMENT      CORONARY ARTERY BYPASS GRAFT      Resolved: 2012    ESOPHAGOGASTRODUODENOSCOPY  2012    Diagnostic    HEMORROIDECTOMY      KNEE SURGERY      LITHOTRIPSY      Renal    MALIGNANT SKIN LESION EXCISION      Face; Resolved: 2004    MA ESOPHAGOGASTRODUODENOSCOPY TRANSORAL DIAGNOSTIC N/A 4/13/2016    Procedure: EGD AND COLONOSCOPY;  Surgeon: Kimberly Luke MD;  Location: [facility];   Service: Gastroenterology    RENAL ARTERY STENT      SKIN LESION EXCISION      Scalp    SOFT TISSUE TUMOR RESECTION      Shoulder; Resolved: 1995    THROMBOLYSIS      Postoperative Thrombolysis PTCA    TONSILLECTOMY      Resolved: 1944 11/13/23 1435   OT Last Visit   OT Visit Date 11/13/23   Note Type   Note type Evaluation   Pain Assessment   Pain Assessment Tool Layne-Baker FACES   Layne-Baker FACES Pain Rating 4   Pain Location/Orientation Orientation: Right;Location: Arm   Hospital Pain Intervention(s) Repositioned; Ambulation/increased activity   Restrictions/Precautions   Weight Bearing Precautions Per Order Yes   RUE Weight Bearing Per Order (S)  NWB  (in splint, s/p 5th metacarpal fracture)   Other Precautions Cognitive; Chair Alarm; Bed Alarm; Fall Risk;Pain;Hard of hearing   Home Living   Type of [address] [place] [address] One level;Stairs to enter with rails  (3 [address])   Bathroom Shower/Tub Walk-in shower   Bathroom Toilet Standard   Bathroom Equipment Built-in shower seat;Grab bars in shower;Commode   [address] Walker;Cane   Additional Comments no use of AD at baseline.  reports that they frequently hold hands or arm in arm when they are out in the community for support   Prior Function   Level of [place] Needs assistance with ADLs  ((A) for showering, (I) with dressing, toileting)   Lives With Spouse   Receives Help From Family   IADLs   ( (A) with medications, meal prep, driving, laundry)   Falls in the last 6 months 1 to 4  (1: reason for admission)   Vocational Retired   Lifestyle   Autonomy PTA pt living with  in [facility], pt requiring (A) with ADLs and IADLs, (+)falls, (-)drives, no use of AD at baseline   Reciprocal Relationships supportive  provides (A) as needed   Service to Others retired   Intrinsic Gratification enjoys going out places with her    General   Additional Pertinent History Pt with fall getting out of car at [employer]. Pt falling onto R UE and R hip.  Fall resulting in 5th metacarpal fracture + multiple contusions (R shoulder, upper arm, R hip, R thigh, mid anterior lower  leg). PMH: mild cognitive impairment, CAD , HTN, a fib   Family/Caregiver Present Yes  ( at bedside to provide PLOF  information)   Subjective   Subjective "It's always me, I'm always late, they even wait to start Christianity for me sometimes"   ADL   Eating Assistance 6  Modified independent   Grooming Assistance 5  Supervision/Setup   Grooming Deficit Supervision/safety;Verbal cueing; Increased time to complete  (standing at sink to wash hands and comb hair)   UB Bathing Assistance 4  Minimal Assistance   LB Bathing Assistance 2  Maximal Assistance   UB Dressing Assistance 4  Minimal [place] 2  Maximal Assistance   LB Dressing Deficit Don/doff R sock; Don/doff L sock   Toileting Assistance  4  Minimal Assistance   Toileting Deficit Increased time to complete;Grab bar use;Clothing management down;Perineal hygiene;Clothing management up   Additional Comments Did not observe eating, UB bathing, LB bathing, and UB dressing at time of evaluation, with use of clinical reasoning, pt's performance throughout evaluation indicates that pt may be able to perform these tasks at the levels listed above   Bed Mobility   Additional Comments OOB and in chair upon arrival and end of session   Transfers   Sit to Stand 4  Minimal assistance   Additional items Assist x 1; Increased time required;Verbal cues   Stand to Sit 4  Minimal assistance   Additional items Assist x 1; Increased time required;Verbal cues   Toilet transfer 4  Minimal assistance   Additional items Assist x 1; Increased time required;Verbal cues;Standard toilet  (VC for safe grab bar use with LUE and maintaining NWB on RUE)   Additional Comments min A x1 for steadying upon standing   Functional Mobility   Functional Mobility 4  Minimal assistance   Additional Comments Ax1, recliner > bathroom >hallway > chair   Additional items SPC Balance   Static Sitting Good   Dynamic Sitting Fair   Static Standing Fair -   Dynamic Standing Poor +   Ambulatory Poor +   Activity Tolerance   Activity Tolerance Patient tolerated treatment well   Medical Staff Made Aware PT CARITO Ramos   RUE Assessment   RUE Assessment   (in splint, able to wiggle fingers, difficulty with pro/supination. WFL elbow to shoulder)   LUE Assessment   LUE Assessment WFL   Vision-Basic Assessment   Current Vision Wears glasses all the time   Cognition   Overall Cognitive Status Impaired   Arousal/Participation Alert; Cooperative   Attention Attends with cues to redirect   Orientation Level Oriented to person;Oriented to place;Oriented to situation;Disoriented to time   Memory Decreased short term memory;Decreased recall of recent events   Following Commands Follows one step commands without difficulty   Comments pleasant and cooperative, whifty at times. Pt with poor insights. Limited problem solving. Hx of MoCA 18/30 in 2021   Assessment   Limitation Decreased ADL status; Decreased UE strength;Decreased Safe judgement during ADL;Decreased cognition;Decreased endurance;Decreased self-care trans;Decreased high-level ADLs  (impaired pain, balance, fxnl mobility, act tor, fxnl reach, standing tor, strength, attention to task, direction following, safety awareness, insight, pacing, problem solving, learning new tasks)   Prognosis Good   Assessment Pt is a 79 y.o. female seen for OT evaluation s/p [facility]23 due to fall. Diagnosed with Multiple contusions. Personal and env factors supporting pt at time of IE include supportive , accessible home environment, and (A) with ADLs and IADLs as needed, no use of AD . Personal and env factors inhibiting engagement in occupations include advanced age, difficulty completing ADLs, and difficulty completing IADLs. Performance deficits that affect the pt’s occupational performance can be seen above.  Due to pt's current functional limitations and medical complications pt is functioning below baseline. Pt would benefit from continued skilled OT treatment in order to maximize safety, independence and overall performance with ADLs, functional mobility, functional transfers, and cognition in order to achieve highest level of function. Goals   Patient Goals to go home   LTG Time Frame 10-14   Long Term Goal see goals listed below   Plan   Treatment Interventions ADL retraining;Functional transfer training;Cognitive reorientation; Endurance training;Patient/family training;Equipment evaluation/education; Compensatory technique education; Energy conservation; Activityengagement   Goal Expiration Date 11/23/23   OT Treatment Day 0   OT Frequency 2-3x/wk   Discharge Recommendation   Rehab Resource Intensity Level, OT III (Minimum Resource Intensity)  (as long as pt's  able to continue to provide physical assistance)   Equipment Recommended Shower/Tub chair with back ($)   AM-PAC Daily Activity Inpatient   Lower Body Dressing 2   Bathing 2   Toileting 3   Upper Body Dressing 3   Grooming 3   Eating 4   Daily Activity Raw Score 17   Daily Activity Standardized Score (Calc for Raw Score >=11) 37.26   AM-PAC Applied Cognition Inpatient   Following a Speech/Presentation 3   Understanding Ordinary Conversation 3   Taking Medications 1   Remembering Where Things Are Placed or Put Away 3   Remembering List of 4-5 Errands 2   Taking Care of Complicated Tasks 1   Applied Cognition Raw Score 13   Applied Cognition Standardized Score 30.46   End of Consult   Patient Position at End of Consult Bedside chair;Bed/Chair alarm activated; All needs within reach     GOALS:      -Patient will perform grooming tasks standing at sink with overall Mod I in order to increase overall independence    -Patient will be Mod I with UB dressing using AE and AD as needed in order to increase (I) with ADLs    -Patient will be Supervision with UB bathing using AE and AD as needed in order to increase (I) with ADLs    -Patient will be Min A  with LB dressing with use of AE and AD as needed in order to increase (I) with ADLs    -Patient will be Min A  with LB bathing with use of AE and AD as needed in order to increase (I) with ADLs    -Patient will complete toileting w/ Min A  w/ G hygiene/thoroughness in order to reduce caregiver burden    -Patient will demonstrate Mod I with bed mobility for ability to manage own comfort and initiate OOB tasks.     -Patient will perform functional transfers with Mod I to/from all surfaces using DME as needed in order to increase (I) with functional tasks    -Patient will be Mod I with functional mobility to/from bathroom for increased independence with toileting tasks    -Patient will engage in ongoing cognitive assessment in order to assist with safe discharge planning/recommendations. The patient's raw score on the -PAC Daily Activity Inpatient Short Form is 17. A raw score of less than 19 suggests the patient may benefit from discharge to post-acute rehabilitation services. Please refer to the recommendation of the Occupational Therapist for safe discharge planning.       Elizabeth Simmons MS, OTR/L

## 2023-11-13 NOTE — ASSESSMENT & PLAN NOTE
- Patient with multiple contusions and hematomas, present on presentation.   - Contusions are noted to be over the right shoulder and proximal upper arm, over the right hip and proximal right thigh and over the mid right anterior lower leg. - There was no evidence of active or ongoing bleeding on clinical exam/evaluation or on CT imaging.  - Hold all antiplatelet and anticoagulant medications until Friday, 11/17/2023.  - Monitor contusions and overlying skin. - Multimodal analgesia as needed. - May remain weightbearing as tolerated on the left upper extremity and bilateral lower extremities; will be NON-weightbearing on the right upper extremity due to associated right fifth metacarpal fracture. - PT and OT evaluation and treatment as indicated. - Case management consulted for disposition planning.

## 2023-11-13 NOTE — CONSULTS
Consultation - Geriatric Medicine   [facility] Shock 79 y.o. female MRN: [id number]  Unit/Bed#: W -01 Encounter: [id number]      Assessment/Plan   Fall  Pt admitted to the hospital after sustaining multiple contusions and a right fifth metacarpal fracture from a fall. Pt states she was getting out of her car in the [employer] parking lot and tripped over the concrete barrier. Pt denies history of any prior falls  [place] fall precautions   Assess patient frequently for physical needs, encourage use of assistant devices as needed and directed by PT/OT  Identify cognitive and physical deficits and behaviors that affect risk of falls  Consider moving patient closer to nursing station to monitor more closely for impulsive behavior which may increase risk of falls  Educate patient/family on patient safety including physical limitations and importance of using call bell for assistance   Modify environment to reduce risk of injury including disconnecting from pole when not in use, ensuring adequate lighting in room and restroom, ensuring that path to restroom is clear and free of trip hazards  PT/OT consulted to assist with strengthening/mobility and assist with discharge planning to appropriate level of care    Multiple Contusions  S/p fall   Noted to be on the right shoulder, proximal upper arm, right hip, proximal right thigh, and mid right anterior lower leg.    Neosporin was applied to the affected areas  No evidence of active bleeding on clinical exam or CT imaging   Pt chronically takes Effient and Xarelto - per trauma will need to check TEG global with hemolysis and platelet mapping   Holding all antiplatelet and anticoagulant medications at this time  Continue to monitor contusions and overlaying skin  Continue to monitor hemodynamic status  Per trauma's recommendations, monitor hemoglobin q8hr   Multimodal analgesia as needed  Pt is currently taking Roxicodone 5 mg Q6H PRN, Roxicodone 2.5 mg Q6H PRN, Tylenol 975 mg Q8H  Pt had one dose of lidocaine 1% inj 10 mL, fentanyl citrate 100 mcg/2ml 25 mcg  Weightbearing as tolerated on left upper extremity and bilateral lower extremities. Non weightbearing on right upper extremity due to associated right fifth metacarpal fracture  PT and OT consulted      Closed Fracture of the Right Fifth Metacarpal   S/p fall  XR R hand/wrist shows oblique fracture of right proximal 5th metacarpal  There was a small superficial laceration distal to the fracture site - abx not required  Splint applied in the ED 11/12  Orthopedic surgery consulted - awaiting recommendations   Provide multimodal pain regimen   Continue non-weightbearing of right upper extremity     Mild Cognitive Impairment   Patient is alert oriented x 4/5 (name, birthday, location, and reason for being here)  She was able to state that it was November, but she was unable to state the day of the week or the year. At risk for delirium due to acute injury, pain, mild cognitive impairment  Patient had a MOCA completed on 10/18/2021 and scored an 18/30. Her previous [address] was 6/14/21 and she scored a 19/30. Recommend reassessing cognitive impairment with a repeat MOCA to determine any worsening. TSH - 1.729 (1/8/21); New orders have been placed since but not collected.   Vitamin D - 51.5 (2/519)  Vitamin B12 - none ordered/collected  Recommend rechecking TSH, vitamin D, and vitamin B12 levels  CT of the head w/o contrast (11/12/23) - no acute intracranial abnormality  Patient is  license has previously been revoked  Patient was previously following outpatient with [facility] office about 2 years ago, would recommend continuing to follow-up on discharge home  Recommend delirium precautions  Maintain sleep-wake cycle, avoid nighttime interruptions  minimize overnight interruptions, group overnight vitals/labs/nursing checks as possible  dim lights, close blinds and turn off tv to minimize stimulation and encourage sleep environment in evenings  Provide adequate pain control  Avoid urinary retention and constipation  Provide frequent and early mobilization  Provide frequent redirection and reorientation as needed  Avoid medications that may worsen or precipitate delirium such as tramadol, benzodiazepines, anticholinergics, and Benadryl  Redirect unwanted behaviors as first-line therapy, avoid physical restraints as able to  Continue to monitor    A-Fib  Currently rate controlled and asymptomatic  On Xarelto 15 mg once daily for anticoagulation while at home  On Heparin while admitted - may need to hold due to multiple contusions   Other medications Effient 10 mg once daily for CAD, Toprol-XL 25 mg once daily   Management per primary    Combined Heart Failure  Weight yesterday was 61.7 kg (136 lbs); no weight recorded for today. Weight log reviewed and stable  Currently on Effient 10 mg once daily  Monitor I's and O's  Encouraged cardiac low-sodium diet  Management per primary    Coronary Arterial Disease  Chronic history of CAD without evidence of acute issue. Continue with current medication regimen; holding all antiplatelet/anticoagulant medications at this time per trauma  Monitor for adequate heart rate control  Managed per primary     Hypertension  Chronic history of hypertension. Taking metoprolol succinate 25 mg once daily while at home. No medications prescribed while admitted at this time. Avoid hypotension  Managed per primary    Hyperlipidemia   Chronic history of hyperlipidemia   Taking Lipitor 20 mg once daily with dinner while at home and admitted  Last lipid panel was collected on 12/4/20. All lipid levels WNL. New order was placed 1/19/23, but has not been collected yet. Encourage a lowfat diet.   Managed per primary    Constipation  Patient complains of constipation  Last BM was during consultation  Is currently on senna 17.2 mg x 2 tabs once daily and Colace 100 mg BID  We will add MiraLAX as needed  Patient complains of severe cramping with her constipation. Encourage adequate p.o. Hydration  Encourage prune juice as tolerated    Vision Impairment  Patient does have vision impairment  Wears glasses at baseline; Does not have them with her. Recommend use of corrective lenses at all appropriate times  Encourage adequate lighting and encourage use of assistance with ambulation  Keep personal belongings close to avoid reaching  Encourage appropriate footwear at all times  Recommend large font for printed materials provided to patient    Dentition/Appetite  Pt does not have dentures. She has natural upper and lower teeth. Pt states that her appetite has been stable within the past year. Encourage a heart-healthy diet.   Avoid hypoglycemia    Insomnia  First-line treatment is behavior modification  Maintain sleep-wake cycle, avoid nighttime interruptions  Avoid caffeine throughout the day  Avoid napping throughout the day  Encourage physical activity throughout the day  Avoid sedative hypnotics including benzodiazepines and benadryl  If needed trial melatonin 3mg qhs    Anxiety/depression  Denies on exam  Takes Zoloft 25 mg daily  Continue to provide emotional support    Frailty  Clinical Frail Scale: 6  Total protein 7.0 (11/12) and albumin 4.1 (11/12)  Encourage adequate hydration and nutrition, including protein in meals  OOB as tolerated  PT/OT consulted  Encourage early and frequent mobilization    Ambulatory Dysfunction  At a baseline ambulates without any assisting devices  PT/OT following  Fall precautions  Out of bed as tolerated  Encourage early and frequent mobilization  Encourage adequate hydration and nutrition  Provide adequate pain management   Goal is undetermined at this time  Continue with PT/OT for continued strength and balance training     Home medication review  Effient 10 mg once daily  Amiodarone 200 mg 1/2 tab once daily with breakfast  Xarelto 15 mg once daily with dinner  Metoprolol succinate 25 mg once daily   Atorvastatin 20 mg once daily   Bumetanide 2 mg once daily   Sertraline 25 mg once daily    Personally confirmed with [id number], [phone]. History of Present Illness   Physician Requesting Consult: Luis Garcia DO  Reason for Consult / Principal Problem: Fall with multiple contusions   Hx and PE limited by: Mild cognitive impairment  Additional history obtained from: Chart review and patient evaluation      HPI: Zohreh Hernandez is a 79y.o. year old female who presents with multiple contusions and a fractured right fifth metacarpal s/p fall yesterday. She states that she tripped over a concrete median while getting out of the car in her [employer] parking lot. She denies any dizziness at the time. She landed on the right side of her body, causing multiple contusions, a fracture of the right fifth metacarpal, and a hematoma on the right anterior lower leg. Today, she states her pain is well controlled with the medication. Her only area of pain is in her right hand, which is splinted. She ranks it as a 4/10. She is non-weightbearing of the right upper extremity due to the fracture. She is weight bearing as tolerated for the left upper extremity and bilateral lower extremities. The patient provided most of her history. She is alert and oriented to name, birthday, month, location, and purpose for being here. She is not oriented for the year or day of the week. She states she lives in a one story home with her . She uses the stairs to go into the basement about once a week, but otherwise she remains on 1 floor. She did not need a cane or walker prior to the fall. Her  and her shared the cooking and cleaning responsibilities of the house. Her  manages her medications for her. She manages the finances. She was able to dress herself prior to the fall.  Her  states that he helps her in the shower and that there are built in bench seats and grab bars. She denies incontinence but sometimes wears a light pad while out in public. She denies insomnia as well as anxiety or depression. She states her appetite has been stable within the past year and denies any changes in weight. She denies any previous falls -  confirmed. She no longer drives and states she does not have a license. She is unsure about a POA but has a living will. Upon examination patient was out of bed in chair, resting. She appeared comfortable and was in no acute distress recheck her complaints of abdominal cramping due to feeling like she has had a bowel movement and being constipated. Appetite is stable. She slept well overnight. Per nursing no acute concerns or issues at this time. Inpatient consult to Gerontology  Consult performed by: Sulema Siddiqi ordered by: Lolita Murray PA-C        Review of Systems   Constitutional:  Positive for activity change. Negative for appetite change, fatigue and unexpected weight change. Respiratory:  Negative for cough, chest tightness and shortness of breath. Cardiovascular:  Negative for chest pain and palpitations. Gastrointestinal:  Positive for constipation and nausea. Negative for abdominal pain, diarrhea and vomiting. Genitourinary:  Negative for difficulty urinating and dysuria. Musculoskeletal:  Positive for arthralgias and back pain. Negative for myalgias. Skin:  Positive for wound (hematoma). Neurological:  Positive for weakness. Negative for dizziness, light-headedness and headaches. Psychiatric/Behavioral:  Negative for dysphoric mood and sleep disturbance. The patient is not nervous/anxious.         Historical Information   Past Medical History:   Diagnosis Date    Anal fissure     Cardiac disorder     Cognitive changes 12/23/2020    Esophageal reflux     Esophagitis, reflux     Hemorrhoids     Hepatic hemangioma     Last Assessed: 1/13/2015    Herpes zoster     History of colonic polyps Hypertension     Ischemic colitis ([address])     Lumbar herniated disc     Malignant neoplasm without specification of site ([address])     Nephrolithiasis     L. Lithotripsy    Nontoxic single thyroid nodule     Last Assessed: 1/13/2015    Osteoarthritis     Overactive bladder     Raynaud disease     Respiratory system disease     Sjogren's disease ([address])     Spinal stenosis     PONCHO (stress urinary incontinence, female)     Uterovaginal prolapse     Grade I-II     Past Surgical History:   Procedure Laterality Date    APPENDECTOMY  1947    CARDIAC PACEMAKER PLACEMENT  01/2021    CARDIAC SURGERY      CABG    CATARACT EXTRACTION Bilateral     COLONOSCOPY  2012    Fiberoptic    COLONOSCOPY      Resolved: 2006 - 2012 5 year f/u    CORONARY ANGIOPLASTY WITH STENT PLACEMENT      CORONARY ARTERY BYPASS GRAFT      Resolved: 2012    ESOPHAGOGASTRODUODENOSCOPY  2012    Diagnostic    HEMORROIDECTOMY      KNEE SURGERY      LITHOTRIPSY      Renal    MALIGNANT SKIN LESION EXCISION      Face; Resolved: 2004    LA ESOPHAGOGASTRODUODENOSCOPY TRANSORAL DIAGNOSTIC N/A 4/13/2016    Procedure: EGD AND COLONOSCOPY;  Surgeon: Daryn Charles MD;  Location: [facility];   Service: Gastroenterology    RENAL ARTERY STENT      SKIN LESION EXCISION      Scalp    SOFT TISSUE TUMOR RESECTION      Shoulder; Resolved: 1995    THROMBOLYSIS      Postoperative Thrombolysis PTCA    TONSILLECTOMY      Resolved: 1944     Social History   Social History     Substance and Sexual Activity   Alcohol Use Yes    Alcohol/week: 1.0 standard drink of alcohol    Types: 1 Glasses of wine per week    Comment: occasionally, but no alcohol intake recently     Social History     Substance and Sexual Activity   Drug Use Never     Social History     Tobacco Use   Smoking Status Never   Smokeless Tobacco Never     Family History:   Family History   Problem Relation Age of Onset    Heart disease Mother     Hypertension Mother     Osteoporosis Mother     Heart disease Father Hypertension Father     No Known Problems Sister     Heart disease Brother     Diabetes Brother     No Known Problems Daughter     No Known Problems Son     No Known Problems Maternal Grandmother     No Known Problems Maternal Grandfather     No Known Problems Paternal Grandmother     No Known Problems Paternal Grandfather     Ulcerative colitis Family     Colon polyps Family     Breast cancer Neg Hx     Colon cancer Neg Hx     Ovarian cancer Neg Hx        Meds/Allergies   current meds:   Current Facility-Administered Medications   Medication Dose Route Frequency    acetaminophen (TYLENOL) tablet 975 mg  975 mg Oral Q8H    atorvastatin (LIPITOR) tablet 20 mg  20 mg Oral Daily With Dinner    docusate sodium (COLACE) capsule 100 mg  100 mg Oral BID    heparin (porcine) subcutaneous injection 5,000 Units  5,000 Units Subcutaneous Q8H [address]    oxyCODONE (ROXICODONE) IR tablet 5 mg  5 mg Oral Q6H PRN    oxyCODONE (ROXICODONE) split tablet 2.5 mg  2.5 mg Oral Q6H PRN    senna (SENOKOT) tablet 17.2 mg  2 tablet Oral Daily    sertraline (ZOLOFT) tablet 25 mg  25 mg Oral Daily    trimethobenzamide (TIGAN) IM injection 200 mg  200 mg Intramuscular Q6H PRN       Allergies   Allergen Reactions    Sulfa Antibiotics Anaphylaxis    Formaldehyde     Codeine Palpitations       Objective     Intake/Output Summary (Last 24 hours) at 11/13/2023 0858  Last data filed at 11/12/2023 1900  Gross per 24 hour   Intake 240 ml   Output --   Net 240 ml     Invasive Devices       Peripheral Intravenous Line  Duration             Peripheral IV 11/12/23 Left;Ventral (anterior) Forearm <1 day                    Physical Exam  Constitutional:       General: She is not in acute distress. Appearance: She is not ill-appearing. Comments: Frail appearing    HENT:      Head: Normocephalic and atraumatic. Cardiovascular:      Rate and Rhythm: Normal rate and regular rhythm. Pulses: Normal pulses. Heart sounds: Normal heart sounds. Pulmonary:      Effort: Pulmonary effort is normal.      Breath sounds: Normal breath sounds. Abdominal:      General: Bowel sounds are decreased. Musculoskeletal:         General: Tenderness and signs of injury present. Right hand: Decreased range of motion (splinted - fifth metacarpal fracture). Left hand: Normal.      Right lower leg: Deformity (hematoma) present. No edema. Left lower leg: Normal. No edema. Comments: Splint left hand    Skin:     General: Skin is warm and dry. Coloration: Skin is pale. Findings: Bruising present. Neurological:      General: No focal deficit present. Mental Status: She is alert and oriented to person, place, and time. Mental status is at baseline. Cranial Nerves: No cranial nerve deficit. Motor: Weakness present. Gait: Gait abnormal (due to injury). Psychiatric:         Mood and Affect: Mood normal.         Behavior: Behavior normal.         Thought Content: Thought content normal.         Judgment: Judgment normal.         Lab Results:   I have personally reviewed pertinent lab results including the following:  CBC w diff  CMP  Protime-INR  APTT  Hemoglobin and hematocrit  BMP    I have personally reviewed the following imaging study reports in PACS:  XR shoulder 2+ vw right  XR femur 2 vw right  XR tib/fib 2 vw right  XR hand 3 vw right  XR wrist 3 vw right   CT head w/o contrast    Therapies:   PT: consulted  OT: consulted    VTE Prophylaxis: Sequential compression device (Venodyne)  and Heparin    Code Status: Level 1 - Full Code  Advance Directive and Living Will: Yes    Power of :    POLST:      Family and Social Support:   Living Arrangements: Lives w/ Spouse/significant other  Support Systems: Spouse/significant other; Daughter  Assistance Needed: none  Type of Current Residence: Private residence  Current Home Care Services: No      Goals of Care: Undetermined at this time.     Scribed by [place] TAJ. Note reviewed and history/physical exam taken with and completed by KATHY Farfan. Please note:  Voice-recognition software may have been used in the preparation of this document. Occasional wrong word or "sound-alike" substitutions may have occurred due to the inherent limitations of voice recognition software. Interpretation should be guided by context.

## 2023-11-13 NOTE — PLAN OF CARE
Problem: PHYSICAL THERAPY ADULT  Goal: Performs mobility at highest level of function for planned discharge setting. See evaluation for individualized goals. Description: Treatment/Interventions: Functional transfer training, Elevations, Endurance training, Cognitive reorientation, Patient/family training, Equipment eval/education, Bed mobility, Gait training, Compensatory technique education, Spoke to nursing, Spoke to case management    Equipment Recommended:  (Pt has SPC at home; therapist left [facility] in room for pt use t/o hospitalization)    See flowsheet documentation for full assessment, interventions and recommendations. Outcome: Progressing  Note: Prognosis: Good  Problem List: Decreased strength, Decreased endurance, Impaired balance, Decreased mobility, Decreased cognition, Impaired judgement, Decreased safety awareness, Impaired hearing, Decreased skin integrity, Orthopedic restrictions, Pain  Assessment: Pt seen for PT evaluation for mobility assessment & discharge needs. Activity orders: NWB R UE. Pt admitted 11/12/2023 s/p fall resulting in R 5th metacarpal fracture and Multiple contusions. Comorbidities affecting pt's fnxl performance include: mild cognitive impairment, falls, Afib, HTN, CAD s/p CABG, depression, seizure, PAD. During PT IE, pt required [place] for transfers and [place] for ambulation of 20ft with no AD. During addt'l treatment pt progresses to ambulate and additional 50ft with SPC and close S. Pt displays above outlined functional impairments & limitations, and presents below her baseline level of functional mobility. The AM-PAC & Barthel Index outcome tools were used to assist in determining pt safety w/ mobility/self care & appropriate d/c recommendations, see above for scores.  Pt is at risk of falls d/t multiple comorbidities, h/o falls, impaired balance, impaired cognition, use of ambulatory aid, varying levels of pain , advanced age, acuity of medical illness, ongoing medical treatment of primary dx, polypharmacy, and WB restriction. Pt's clinical presentation is currently unstable/unpredictable as seen in pt's presentation of changing level of pain, varying levels of cognitive performance, increased fall risk, new onset of impairment of functional mobility, and new onset of weakness. Pt will benefit from continued PT services in order to address impairments, decrease risk of falls, maximize independence w/ fnxl mobility, & ensure safety w/ mobility for transition to next level of care. Based on pt presentation & impairments, pt would most appropriately benefit from Level III (minimal PT intensity) resources upon d/c.    Barriers to Discharge: None (pending progress with [address] her home (spouse reports he is always there to assist))     Rehab Resource Intensity Level, PT: III (Minimum Resource Intensity)    See flowsheet documentation for full assessment.

## 2023-11-13 NOTE — PROGRESS NOTES
[address] [facility] DailyLook  Progress Note  Name: Shaunna Davenport  MRN: [id number]  Unit/Bed#: W -01 I Date of Admission: 11/12/2023   Date of Service: 11/13/2023 I Hospital Day: 0    Assessment/Plan   Fall  Assessment & Plan  - Status post mechanical fall with the below noted injuries. - Fall precautions. - Geriatric Medicine consultation for evaluation, medication review and recommendations.  - PT and OT evaluation and treatment as indicated. - Case Management consultation for disposition planning. * Multiple contusions  Assessment & Plan  - Patient with multiple contusions and hematomas, present on presentation.   - Contusions are noted to be over the right shoulder and proximal upper arm, over the right hip and proximal right thigh and over the mid right anterior lower leg. - There was no evidence of active or ongoing bleeding on clinical exam/evaluation or on CT imaging.  - Hold all antiplatelet and anticoagulant medications. - If patient develops evidence of active or ongoing bleeding, will need to check TEG global with hemolysis and platelet mapping as patient does chronically take Effient and Xarelto. - Monitor contusions and overlying skin. - Monitor hemodynamic status. - Monitor hemoglobin every 8 hours. - Multimodal analgesia as needed. - May remain weightbearing as tolerated on the left upper extremity and bilateral lower extremities; will be nonweightbearing on the right upper extremity due to associated right fifth metacarpal fracture. - PT and OT evaluation and treatment as indicated. - Case management consulted for disposition planning.     Closed fracture of fifth metacarpal bone of right hand  Assessment & Plan  XR R hand/wrist: Oblique fracture of R proximal 5th metacarpal.  Small superficial laceration distal to the fracture site- abx not required  Splinted in the ED 11/12  Appreciate orthopedic surgery's recommendations  Outpt orthopedic surgery follow up    A-fib [facility])  Assessment & Plan  - Patient with chronic history of atrial fibrillation with adequate heart rate control at this time. - Continue current medication regimen. - Plan to hold Xarelto and Effient due to multiple contusions/hematomas. - Heparin subq for VTE prophyaxis  - Monitor for adequate heart rate control.  - Resume home medication therapy on discharge as appropriate. - Outpatient follow-up per routine. Mild cognitive impairment  Assessment & Plan  - Patient with mild cognitive impairment, present on presentation.  - Geriatric medicine consultation to assist with evaluation and management. - Outpatient follow-up per routine. CAD (coronary artery disease)  Assessment & Plan  - Patient with chronic history of CAD without evidence of acute issue.  - Continue current medication regimen. - Plan to hold antiplatelet and anticoagulant medications due to multiple contusions/hematomas. - Monitor for adequate heart rate control.  - Resume home medication therapy on discharge as appropriate. - Outpatient follow-up per routine. 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 - Monitor volume status. - Continue home medication regimen as appropriate. - Outpatient follow-up per routine. Essential hypertension  Assessment & Plan  - Patient with chronic history of hypertension.  - Continue current medication regimen and resume home medication therapy and discharge as appropriate. - Outpatient follow-up per routine.              Bowel Regimen: Colace, Senna  VTE Prophylaxis:Sequential compression device (Venodyne)  , subq heparin    Disposition: Continue M/S level of care at this time.    Subjective   Chief Complaint: Continue M/S level of care at this time, PT/OT and orthopedic surgery to eval and treat. Subjective:     Pt reports she continues to have mild pain in the R LAT hand and in multiple places over bruises throughout the body such as the R arm and R leg. Slept somewhat through the night and is overall feeling well. Denies HA, CP, SOB, abdominal pain, N/V/D or any other Sx. No other concerns. Objective   Vitals:   Temp:  [97.5 °F (36.4 °C)-97.9 °F (36.6 °C)] 97.5 °F (36.4 °C)  HR:  [71-76] 73  Resp:  [16-20] 18  BP: (128-144)/(53-69) 144/56    I/O         11/11 0701  11/12 0700 11/12 0701 11/13 0700 11/13 0701 11/14 0700    P. O.  240     Total Intake(mL/kg)  240 (3.9)     Net  +240            Unmeasured Urine Occurrence  0 x     Unmeasured Stool Occurrence  1 x              Physical Exam:   GENERAL APPEARANCE: Awake, alert, NAD, well appearing. NEURO: AxO x3, moves all 4 extremities equally and without difficulty. Sensation to light touch intact BILAT throughout. HEENT: EOMI BILAT. Normal conjunctiva. Moist mucous membranes. CV: RRR, No M/R/G.  LUNGS: CTA BILAT. Normal effort. GI: No TTP throughout. Soft. Normal BS. : Deferred. MSK: No midline TTP throughout C/T/L/S spine. R hand is in an ulnar gutter splint, distally neurovascularly intact. Bruising and tenderness over R LAT proximal UE. 5 cm fluctuant hematoma over the R distal ANT LE. SKIN: Warm, dry.     Invasive Devices       Peripheral Intravenous Line  Duration             Peripheral IV 11/12/23 Left;Ventral (anterior) Forearm 1 day                          Lab Results: Results: I have personally reviewed all pertinent laboratory/tests results, BMP/CMP:   Lab Results   Component Value Date    SODIUM 139 11/13/2023    K 3.7 11/13/2023     11/13/2023    CO2 25 11/13/2023    BUN 16 11/13/2023    CREATININE 1.02 11/13/2023    CALCIUM 8.8 11/13/2023    EGFR 50 11/13/2023   , and CBC:   Lab Results Component Value Date    WBC 7.00 11/13/2023    HGB 11.6 11/13/2023    HCT 36.6 11/13/2023    MCV 92 11/13/2023     11/13/2023    RBC 3.92 11/13/2023    MCH 30.1 11/13/2023    MCHC 32.8 11/13/2023    RDW 14.1 11/13/2023    MPV 11.1 11/13/2023     Imaging: I have personally reviewed pertinent reports.

## 2023-11-13 NOTE — PLAN OF CARE
Problem: OCCUPATIONAL THERAPY ADULT  Goal: Performs self-care activities at highest level of function for planned discharge setting. See evaluation for individualized goals. Description: Treatment Interventions: ADL retraining, Functional transfer training, Cognitive reorientation, Endurance training, Patient/family training, Equipment evaluation/education, Compensatory technique education, Energy conservation, Activityengagement  Equipment Recommended: Shower/Tub chair with back ($)       See flowsheet documentation for full assessment, interventions and recommendations. Note: Limitation: Decreased ADL status, Decreased UE strength, Decreased Safe judgement during ADL, Decreased cognition, Decreased endurance, Decreased self-care trans, Decreased high-level ADLs (impaired pain, balance, fxnl mobility, act tor, fxnl reach, standing tor, strength, attention to task, direction following, safety awareness, insight, pacing, problem solving, learning new tasks)  Prognosis: Good  Assessment: Pt is a 79 y.o. female seen for OT evaluation s/p [facility] due to fall. Diagnosed with Multiple contusions. Personal and env factors supporting pt at time of IE include supportive , accessible home environment, and (A) with ADLs and IADLs as needed, no use of AD . Personal and env factors inhibiting engagement in occupations include advanced age, difficulty completing ADLs, and difficulty completing IADLs. Performance deficits that affect the pt’s occupational performance can be seen above. Due to pt's current functional limitations and medical complications pt is functioning below baseline. Pt would benefit from continued skilled OT treatment in order to maximize safety, independence and overall performance with ADLs, functional mobility, functional transfers, and cognition in order to achieve highest level of function.      Rehab Resource Intensity Level, OT: III (Minimum Resource Intensity) (as long as pt's  able to continue to provide physical assistance)

## 2023-11-13 NOTE — ASSESSMENT & PLAN NOTE
- Patient with chronic history of atrial fibrillation with adequate heart rate control at this time. - Continue current medication regimen. - Plan to hold Xarelto and Effient due to multiple contusions/hematomas. - Heparin subq for VTE prophyaxis  - Monitor for adequate heart rate control.  - Resume home medication therapy on discharge as appropriate. - Outpatient follow-up per routine.

## 2023-11-13 NOTE — ASSESSMENT & PLAN NOTE
- Acute oblique fracture of the right proximal fifth metacarpal, present on admission.  - Appreciate Orthopedic surgery evaluation, recommendations and interventions as noted. - Nonoperative management recommended. - Maintain NON-weightbearing status on the right upper extremity/hand in splint.  - Monitor right upper extremity neurovascular exam.  - Continue multimodal analgesic regimen.  - PT and OT evaluation and treatment as indicated. - Outpatient follow up with Orthopedic surgery for re-evaluation.

## 2023-11-13 NOTE — PHYSICAL THERAPY NOTE
PHYSICAL THERAPY EVALUATION & TREATMENT  DATE: 11/13/23  TIME: 9387-1332    NAME:  Susanna Newman  AGE:   79 y.o. Mrn:   [id number]  Length Of Stay: 0    ADMIT DX:  Leg pain [M79.606]  Arm injury [S49.90XA]  Anticoagulated [Z79.01]  Shoulder contusion [S40.019A]  Contusion of right hip, initial encounter [S70.01XA]  Laceration of right upper extremity, initial encounter [S41.111A]  Nondisplaced fracture of base of fifth metacarpal bone, right hand, initial encounter for closed fracture [S62.346A]  Hematoma of right lower leg [S80.11XA]    Past Medical History:   Diagnosis Date    Anal fissure     Cardiac disorder     Cognitive changes 12/23/2020    Esophageal reflux     Esophagitis, reflux     Hemorrhoids     Hepatic hemangioma     Last Assessed: 1/13/2015    Herpes zoster     History of colonic polyps     Hypertension     Ischemic colitis ([address])     Lumbar herniated disc     Malignant neoplasm without specification of site ([address])     Nephrolithiasis     L.  Lithotripsy    Nontoxic single thyroid nodule     Last Assessed: 1/13/2015    Osteoarthritis     Overactive bladder     Raynaud disease     Respiratory system disease     Sjogren's disease ([address])     Spinal stenosis     PONCHO (stress urinary incontinence, female)     Uterovaginal prolapse     Grade I-II     Past Surgical History:   Procedure Laterality Date    APPENDECTOMY  1947    CARDIAC PACEMAKER PLACEMENT  01/2021    CARDIAC SURGERY      CABG    CATARACT EXTRACTION Bilateral     COLONOSCOPY  2012    Fiberoptic    COLONOSCOPY      Resolved: 2006 - 2012 5 year f/u    CORONARY ANGIOPLASTY WITH STENT PLACEMENT      CORONARY ARTERY BYPASS GRAFT      Resolved: 2012    ESOPHAGOGASTRODUODENOSCOPY  2012    Diagnostic    HEMORROIDECTOMY      KNEE SURGERY      LITHOTRIPSY      Renal    MALIGNANT SKIN LESION EXCISION      Face; Resolved: 2004    TN ESOPHAGOGASTRODUODENOSCOPY TRANSORAL DIAGNOSTIC N/A 4/13/2016    Procedure: EGD AND COLONOSCOPY;  Surgeon: Clover Goodman MD; [facility]; Service: Gastroenterology    RENAL ARTERY STENT      SKIN LESION EXCISION      Scalp    SOFT TISSUE TUMOR RESECTION      Shoulder; Resolved: 1995    THROMBOLYSIS      Postoperative Thrombolysis PTCA    TONSILLECTOMY      Resolved: 1944       Performed at least 2 patient identifiers during session: Name, Leanne Bamberger, ID ravencelet, and Epic photo     11/13/23 1407   PT Last Visit   PT Visit Date 11/13/23   Note Type   Note type Evaluation  (& treatment (4601-9172))   Pain Assessment   Pain Assessment Tool Layne-Baker FACES   Layne-Baker FACES Pain Rating 4   Pain Location/Orientation Orientation: Right;Orientation: Lower; Location: Arm   Pain Onset/Description Onset: Ongoing; Descriptor: Aching   Effect of Pain on Daily Activities limits comfort   Patient's Stated Pain Goal No pain   Hospital Pain Intervention(s) Repositioned; Ambulation/increased activity   Multiple Pain Sites No   Restrictions/Precautions   Weight Bearing Precautions Per Order Yes   RUE Weight Bearing Per Order NWB  (in splint, s/p 5th metacarpal fracture)   Braces or Orthoses Splint  (R wrist splint)   Other Precautions WBS; Chair Alarm; Bed Alarm; Fall Risk;Pain;Hard of hearing   Home Living   Type of [address] [place] [address] One level;Stairs to enter with rails  (3 LUDWIN through garage with RHR ascending, maintains FFSU)   Bathroom Shower/Tub Walk-in shower   Bathroom Toilet Standard   Bathroom Equipment Built-in shower seat;Grab bars in shower;Commode   [address] Walker;Cane   Prior Function   Level of [place] Independent with ADLs; Independent with functional mobility; Needs assistance with IADLS  (Assist from spouse for showering, otherwise independent with ADLs)   Lives With Spouse   Receives Help From Family   IADLs Independent with meal prep; Independent with medication management; Family/Friend/Other provides transportation   Falls in the last 6 months 1 to 4  (1 fall leading to current hospitalization)   Vocational Retired   Comments At baseline, pt is independent with most self care (spouse A with showers); ambulates household distances with no AD, will often go hand in hand or arm in arm with spouse for community mobility. General   Additional Pertinent History Pt admitted 11/12/2023 s/p fall resulting in R 5th metacarpal fracture and Multiple contusions. NWB R UE in splint. Family/Caregiver Present Yes  (spouse present t/o session)   Cognition   Overall Cognitive Status Impaired   Arousal/Participation Cooperative   Orientation Level Oriented to person;Oriented to place;Oriented to situation;Disoriented to time   Memory Decreased short term memory;Decreased recall of precautions   Following Commands Follows one step commands without difficulty   Subjective   Subjective "Did they tell you why this happened?" "Of course we were running late and it was because of me."   RUE Assessment   RUE Assessment WFL  (NT at hand and wrist d/t WBing restriction and splinting; WFL at elbow and shoulder; painful supination/pronation)   LUE Assessment   LUE Assessment WFL   RLE Assessment   RLE Assessment WFL   LLE Assessment   LLE Assessment WFL   Vision-Basic Assessment   Current Vision Wears glasses all the time   Coordination   Movements are Fluid and Coordinated 1   Sensation WFL   Light Touch   RLE Light Touch Grossly intact   LLE Light Touch Grossly intact   Proprioception   RLE Proprioception Grossly intact   LLE Proprioception Grossly Intact   Bed Mobility   Additional Comments Bed mobility NT on this date as pt presents and was left seated OOB in recliner chair with alarm engaged. Transfers   Sit to Stand 4  Minimal assistance   Additional items Assist x 1; Increased time required;Verbal cues   Stand to Sit 4  Minimal assistance   Additional items Assist x 1; Increased time required;Verbal cues   Stand pivot 4  Minimal assistance   Additional items Assist x 1; Increased time required;Verbal cues Toilet transfer 4  Minimal assistance   Additional items Assist x 1; Increased time required;Verbal cues;Standard toilet  (cues for positioning and hand placement; avoidance of RUE use in order to maintain NWB R UE)   Additional Comments Pt needing CGA/ROGER for steadying during ascent/descent from surfaces; HHA during pivoting and approach to target surfaces. Ambulation/Elevation   Gait pattern Forward Flexion;Decreased foot clearance; Short stride; Excessively slow;Decreased hip extension;Decreased heel strike   Gait Assistance 4  Minimal assist  (HHA from therapist)   Additional items Assist x 1;Verbal cues; Tactile cues   Assistive Device None  (HHA from therapist)   Distance 20ft x1   Stair Management Assistance Not tested  (pt has 3 LUDWIN with RHR ascending in order to access her home)   Ambulation/Elevation Additional Comments See below treatment section for additional gait training with use of SPC, to improve safety and stability with ambulation. Balance   Static Sitting Good   Dynamic Sitting Fair   Static Standing Fair -   Dynamic Standing Poor +   Ambulatory Poor +   Endurance Deficit   Endurance Deficit Yes   Endurance Deficit Description Generalized fatigue and deconditioning   Activity Tolerance   Activity Tolerance Patient limited by fatigue;Patient limited by pain; Other (Comment)  (impaired cognition)   Medical Staff Made Aware Spoke with CM, OT   Nurse Made Aware Spoke with CARITO Hahn   Assessment   Prognosis Good   Problem List Decreased strength;Decreased endurance; Impaired balance;Decreased mobility; Decreased cognition; Impaired judgement;Decreased safety awareness; Impaired hearing;Decreased skin integrity;Orthopedic restrictions;Pain   Assessment Pt seen for PT evaluation for mobility assessment & discharge needs. Activity orders: NWB R UE. Pt admitted 11/12/2023 s/p fall resulting in R 5th metacarpal fracture and Multiple contusions.  Comorbidities affecting pt's fnxl performance include: mild cognitive impairment, falls, Afib, HTN, CAD s/p CABG, depression, seizure, PAD. During PT IE, pt required [facility] for transfers and [facility] for ambulation of 20ft with no AD. During addt'l treatment pt progresses to ambulate and additional 50ft with SPC and close S. Pt displays above outlined functional impairments & limitations, and presents below her baseline level of functional mobility. The AM-PAC & Barthel Index outcome tools were used to assist in determining pt safety w/ mobility/self care & appropriate d/c recommendations, see above for scores. Pt is at risk of falls d/t multiple comorbidities, h/o falls, impaired balance, impaired cognition, use of ambulatory aid, varying levels of pain , advanced age, acuity of medical illness, ongoing medical treatment of primary dx, polypharmacy, and WB restriction. Pt's clinical presentation is currently unstable/unpredictable as seen in pt's presentation of changing level of pain, varying levels of cognitive performance, increased fall risk, new onset of impairment of functional mobility, and new onset of weakness. Pt will benefit from continued PT services in order to address impairments, decrease risk of falls, maximize independence w/ fnxl mobility, & ensure safety w/ mobility for transition to next level of care. Based on pt presentation & impairments, pt would most appropriately benefit from Level III (minimal PT intensity) resources upon d/c.   Barriers to Discharge None  (pending progress with 3 LUDWIN her home (spouse reports he is always there to assist))   Goals   Patient Goals to maximize functional independence   STG Expiration Date 11/23/23   Short Term Goal #1 Patient PT goals established in order to maximize functional independence.  Pt will: complete all bed mobility independently in order to promote increased OOB functional mobility to improve overall activity tolerance; complete all transfers independently in order to increase safety with functional mobility; ambulate >150ft with LRAD and no greater than S in order to increase safety with household and short community functional mobility; negotiate 3 steps with no greater than ROGER in order to facilitate safe access to her home with spouse A; improve ambulatory balance to >/= good grade with LRAD in order to promote safety and increased independence with mobility; tolerate >3hrs OOB in upright position, in order to improve muscular endurance and respiratory status; improve AM-PAC score to >/= 23/24 in order to increase independence with mobility and decrease burden of care; improve Barthel Index score to >/= 60/100 in order to increase independence and decrease risk of falls. PT Treatment Day 0   Plan   Treatment/Interventions Functional transfer training;Elevations; Endurance training;Cognitive reorientation;Patient/family training;Equipment eval/education; Bed mobility;Gait training; Compensatory technique education;Spoke to nursing;Spoke to case management   PT Frequency 2-3x/wk   Discharge Recommendation   Rehab Resource Intensity Level, PT III (Minimum Resource Intensity)   Equipment Recommended   (Pt has SPC at home; therapist left SPC in room for pt use t/o hospitalization)   AM-PAC Basic Mobility Inpatient   Turning in Flat Bed Without Bedrails 3   Lying on Back to Sitting on Edge of Flat Bed Without Bedrails 3   Moving Bed to Chair 3   Standing Up From Chair Using Arms 3   Walk in Room 3   Climb 3-5 Stairs With Railing 3   Basic Mobility Inpatient Raw Score 18   Basic Mobility Standardized Score 41.05   Highest Level Of Mobility   JH-HLM Goal 6: Walk 10 steps or more   JH-HLM Achieved 7: Walk 25 feet or more   Modified [place] Scale   Modified [place] Scale 3   Barthel Index   Feeding 5   Bathing 0   Grooming Score 0   Dressing Score 5   Bladder Score 10   Bowels Score 10   Toilet Use Score 5   Transfers (Bed/Chair) Score 10   Mobility (Level Surface) Score 0   Stairs Score 5   Barthel Index Score 50   Additional Treatment Session   Start Time 6288   End Time 2801   Treatment Assessment Pt is agreeable to participate in additional functional mobility/gait training with use of SPC post IE. Therapist recommended and provided SPC to pt for improved balance and stability with ambulation, improved safety. With use of SPC, pt progresses to ambulate and additional 50ft with SPC and close S. Ongoing gait deviations of: fwd flexion, decreased stride length and foot clearance - however pt with improved stability and balance. At end of session, pt was left seated OOB in recliner chair with alarm engaged and spouse in room, care returned to RN. Regarding functional mobility, pt remains below her baseline level of mobility and remains at high risk of falls. Continue to recommend Level III (minimal PT intensity) resources once medically cleared for d/c from the acute care setting. Will continue skilled PT POC as able and appropriate to address functional impairments and progress towards therapy goals. Next session, plan for intervention of increased ambulation and elevations training as able. Equipment Use SPC, Ax1   Additional Treatment Day 1   End of Consult   Patient Position at End of Consult Bedside chair;Bed/Chair alarm activated; All needs within reach  (spouse in room, care returned to RN)       Based on patient's Automatic Data Highest Level of Mobility scores today, patient currently has a goal of [facility] Levels: 7: WALK 25 FEET OR MORE, to be completed with RN staffing each shift, in order to improve overall activity tolerance and mobility, combat hospital related deconditioning, and maximize outcomes for d/c from the acute care setting. The patient's AM-PAC Basic Mobility Inpatient Short Form Raw Score is 18. A Raw score of greater than 16 suggests the patient may benefit from discharge to home. Please also refer to the recommendation of the Physical Therapist for safe discharge planning.       Chaparro Melendez, PT, DPT Available via [facility] # [id number]  [place] License - [id number]  [id number]/[id number]

## 2023-11-14 ENCOUNTER — TRANSITIONAL CARE MANAGEMENT (OUTPATIENT)
Dept: FAMILY MEDICINE CLINIC | Facility: MEDICAL CENTER | Age: 83
End: 2023-11-14

## 2023-11-15 ENCOUNTER — TELEPHONE (OUTPATIENT)
Age: 83
End: 2023-11-15

## 2023-11-15 NOTE — ED PROVIDER NOTES
History  Chief Complaint   Patient presents with    Fall     Patient tripped and fell getting out of car.  helped her up and into care now. +Thinners, -LOC, -Headstrike. C/O right sided pain in neck, shoulder, arm, hip, leg. Hematoma on right leg, skin tear on right hand. Burning pain in right leg     Patient is an 48-year-old female presents to the emergency department for evaluation following fall. She had just exited her car in a [employer] parking lot when she tripped over a cement barrier landing on her right side. She did not strike her head or experience loss of consciousness and was assisted back into her vehicle by a couple of men. She presented to urgent care for evaluation of her injuries and after assessment was advised to come in to the emergency department for further care. Pain is greatest in the right hand and hip. She also has much discomfort in the right shoulder. No chest pain or dyspnea. No nause or vomiting. She is anticoagulated  with Xarelto for atrial fibrillation. Most recent dose was taken this morning. Past medical history significant also for hypertension, CAD, CHF and sick sinus syndrome. Prior to Admission Medications   Prescriptions Last Dose Informant Patient Reported? Taking?    Xarelto 15 MG tablet   No No   Sig: TAKE 1 TABLET BY MOUTH DAILY WITH DINNER.   amiodarone 200 mg tablet   No No   Sig: TAKE 1/2 TABLET (100 MG TOTAL) BY MOUTH DAILY WITH BREAKFAST   atorvastatin (LIPITOR) 20 mg tablet  Spouse/Significant Other No No   Sig: TAKE 1 TABLET BY MOUTH EVERY DAY WITH DINNER   bumetanide (BUMEX) 2 mg tablet  Spouse/Significant Other No No   Sig: TAKE 1 TABLET BY MOUTH EVERY DAY   clotrimazole-betamethasone (LOTRISONE) 1-0.05 % cream   No No   Sig: APPLY TO AFFECTED AREA TWICE A DAY   hydrOXYzine HCL (ATARAX) 25 mg tablet   No No   Sig: Take 1 tablet (25 mg total) by mouth every 6 (six) hours as needed for itching   levETIRAcetam (KEPPRA) 500 mg tablet Spouse/Significant Other No No   Sig: Take 1 tablet (500 mg total) by mouth every 12 (twelve) hours   metoprolol succinate (TOPROL-XL) 25 mg 24 hr tablet  Spouse/Significant Other No No   Sig: TAKE 1 TABLET BY MOUTH EVERY DAY   multivitamin (THERAGRAN) TABS  Spouse/Significant Other Yes No   Sig: Take 1 tablet by mouth daily. nitroglycerin (NITROSTAT) 0.4 mg SL tablet  Spouse/Significant Other No No   Sig: Place 1 tablet (0.4 mg total) under the tongue every 5 (five) minutes as needed for chest pain   prasugrel (EFFIENT) tablet   No No   Sig: TAKE 1 TABLET BY MOUTH EVERY DAY   sertraline (ZOLOFT) 25 mg tablet  Spouse/Significant Other No No   Sig: TAKE 1 TABLET BY MOUTH EVERY DAY      Facility-Administered Medications: None       Past Medical History:   Diagnosis Date    Anal fissure     Cardiac disorder     Cognitive changes 12/23/2020    Esophageal reflux     Esophagitis, reflux     Hemorrhoids     Hepatic hemangioma     Last Assessed: 1/13/2015    Herpes zoster     History of colonic polyps     Hypertension     Ischemic colitis ([address])     Lumbar herniated disc     Malignant neoplasm without specification of site ([address])     Nephrolithiasis     L.  Lithotripsy    Nontoxic single thyroid nodule     Last Assessed: 1/13/2015    Osteoarthritis     Overactive bladder     Raynaud disease     Respiratory system disease     Sjogren's disease ([address])     Spinal stenosis     PONCHO (stress urinary incontinence, female)     Uterovaginal prolapse     Grade I-II       Past Surgical History:   Procedure Laterality Date    APPENDECTOMY  1947    CARDIAC PACEMAKER PLACEMENT  01/2021    CARDIAC SURGERY      CABG    CATARACT EXTRACTION Bilateral     COLONOSCOPY  2012    Fiberoptic    COLONOSCOPY      Resolved: 2006 - 2012 5 year f/u    CORONARY ANGIOPLASTY WITH STENT PLACEMENT      CORONARY ARTERY BYPASS GRAFT      Resolved: 2012    ESOPHAGOGASTRODUODENOSCOPY  2012    Diagnostic    HEMORROIDECTOMY      KNEE SURGERY      LITHOTRIPSY Renal    MALIGNANT SKIN LESION EXCISION      Face; Resolved: 2004    [place] ESOPHAGOGASTRODUODENOSCOPY TRANSORAL DIAGNOSTIC N/A 4/13/2016    Procedure: EGD AND COLONOSCOPY;  Surgeon: Geena Aldridge MD;  Location: [facility]; Service: Gastroenterology    RENAL ARTERY STENT      SKIN LESION EXCISION      Scalp    SOFT TISSUE TUMOR RESECTION      Shoulder; Resolved: 1995    THROMBOLYSIS      Postoperative Thrombolysis PTCA    TONSILLECTOMY      Resolved: 1944       Family History   Problem Relation Age of Onset    Heart disease Mother     Hypertension Mother     Osteoporosis Mother     Heart disease Father     Hypertension Father     No Known Problems Sister     Heart disease Brother     Diabetes Brother     No Known Problems Daughter     No Known Problems Son     No Known Problems Maternal Grandmother     No Known Problems Maternal Grandfather     No Known Problems Paternal Grandmother     No Known Problems Paternal Grandfather     Ulcerative colitis Family     Colon polyps Family     Breast cancer Neg Hx     Colon cancer Neg Hx     Ovarian cancer Neg Hx      I have reviewed and agree with the history as documented. E-Cigarette/Vaping    E-Cigarette Use Never User      E-Cigarette/Vaping Substances    Nicotine No     THC No     CBD No     Flavoring No     Other No     Unknown No      Social History     Tobacco Use    Smoking status: Never    Smokeless tobacco: Never   Vaping Use    Vaping Use: Never used   Substance Use Topics    Alcohol use: Yes     Alcohol/week: 1.0 standard drink of alcohol     Types: 1 Glasses of wine per week     Comment: occasionally, but no alcohol intake recently    Drug use: Never       Review of Systems   All other systems reviewed and are negative. Physical Exam  Physical Exam  Vitals and nursing note reviewed. Constitutional:       Appearance: Normal appearance. Comments: Upon initial greeting patient appears fairly comfortable.   Any slight position change however elicits some grimacing. HENT:      Head: Normocephalic and atraumatic. Mouth/Throat:      Mouth: Mucous membranes are moist.   Eyes:      General: No scleral icterus. Extraocular Movements: Extraocular movements intact. Conjunctiva/sclera: Conjunctivae normal.   Cardiovascular:      Rate and Rhythm: Normal rate. Rhythm irregular. Heart sounds: Normal heart sounds. Pulmonary:      Effort: Pulmonary effort is normal.      Breath sounds: Normal breath sounds. Comments: No ecchymosis or swelling of the chest wall  Abdominal:      General: Bowel sounds are normal.      Palpations: Abdomen is soft. Tenderness: There is no abdominal tenderness. Comments: No ecchymosis or swelling. Musculoskeletal:      Cervical back: Normal range of motion. Comments: Large contusion-bright pink and purple affecting the entirety of the right shoulder. Moderate swelling present. She does actively range the shoulder some without crepitus. No clavicular tenderness or deformity appreciated. Right elbow and forearm are nontender. There is tenderness of the right wrist.  Medial aspect of the hand is tender to palpation. First 4 fingers are nontender and with full range of motion. Curvilinear laceration appreciated just proximal to the right fifth finger. Finger itself is nontender and with good coloring, temperature and sensation. She limits its ranging secondary to pain. Bandage with gauze and Coban was loosely applied over this. No active bleeding. Moderate ecchymosis appreciated Over lateral and anterior aspect of right hip region. This is patchy and with minimal swelling. There is a focal area of hematoma over the anterolateral aspect of the mid right lower leg. Overlying dressing with gauze and Coban present. Exquisitely tender with reported burning sensation. Overlying skin is intact although pale. Remainder of leg around the site is nontender. +2 bilateral PT pulses.   Good range of motion of feet, knees and hips. Skin:     General: Skin is warm and dry. Neurological:      Mental Status: She is alert and oriented to person, place, and time. Comments: No facial asymmetry appreciated with all maneuvers. Good strength in bilateral handgrip. Right shoulder ranging limited secondary to pain although she is able to actively range this some. Sensation in the upper extremities is intact. Good strength with bilateral hip flexion, dorsi and plantarflexion. Sensation in the lower extremities is grossly intact. Ambulation deferred. No dysmetria.    Psychiatric:         Mood and Affect: Mood normal.         Behavior: Behavior normal.         Vital Signs  ED Triage Vitals   Temperature Pulse Respirations Blood Pressure SpO2   11/12/23 1124 11/12/23 1124 11/12/23 1124 11/12/23 1124 11/12/23 1124   97.8 °F (36.6 °C) 94 20 113/58 95 %      Temp Source Heart Rate Source Patient Position - Orthostatic VS BP Location FiO2 (%)   11/12/23 1124 11/12/23 1124 11/12/23 1124 11/12/23 1124 --   Oral Monitor Lying Right arm       Pain Score       11/12/23 1720       5           Vitals:    11/12/23 2054 11/13/23 0820 11/13/23 1124 11/13/23 1523   BP: 136/57 144/53 144/56 145/53   Pulse: 71 76 73 74   Patient Position - Orthostatic VS:   Sitting          Visual Acuity      ED Medications  Medications   fentanyl citrate (PF) 100 MCG/2ML 25 mcg (25 mcg Intravenous Given 11/12/23 1225)   iohexol (OMNIPAQUE) 350 MG/ML injection (MULTI-DOSE) 100 mL (80 mL Intravenous Given 11/12/23 1306)   lidocaine (PF) (XYLOCAINE-MPF) 1 % injection 10 mL (10 mL Infiltration Given by Other 11/12/23 1500)   neomycin-bacitracin-polymyxin b (NEOSPORIN) ointment 1 small application (1 small application Topical Given 11/12/23 1608)   influenza vaccine, high-dose (FLUZONE HIGH-DOSE) IM injection ROSALIND 0.7 mL (0.7 mL Intramuscular Given 11/12/23 1825)       Diagnostic Studies  Results Reviewed       Procedure Component Value Units Date/Time Hemoglobin and Hematocrit, blood [[id number]]  (Normal) Collected: 11/13/23 0836    Lab Status: Final result Specimen: Blood from Arm, Left Updated: 11/13/23 0843     Hemoglobin 11.6 g/dL      Hematocrit 36.6 %     Basic metabolic panel [[id number]]  (Abnormal) Collected: 11/13/23 0433    Lab Status: Final result Specimen: Blood from Hand, Left Updated: 11/13/23 0517     Sodium 139 mmol/L      Potassium 3.7 mmol/L      Chloride 106 mmol/L      CO2 25 mmol/L      ANION GAP 8 mmol/L      BUN 16 mg/dL      Creatinine 1.02 mg/dL      Glucose 102 mg/dL      Glucose, Fasting 102 mg/dL      Calcium 8.8 mg/dL      eGFR 50 ml/min/1.73sq m     Narrative:      [place] guidelines for Chronic Kidney Disease (CKD):     Stage 1 with normal or high GFR (GFR > 90 mL/min/1.73 square meters)    Stage 2 Mild CKD (GFR = 60-89 mL/min/1.73 square meters)    Stage 3A Moderate CKD (GFR = 45-59 mL/min/1.73 square meters)    Stage 3B Moderate CKD (GFR = 30-44 mL/min/1.73 square meters)    Stage 4 Severe CKD (GFR = 15-29 mL/min/1.73 square meters)    Stage 5 End Stage CKD (GFR <15 mL/min/1.73 square meters)  Note: GFR calculation is accurate only with a steady state creatinine    CBC (With Platelets) [[id number]]  (Normal) Collected: 11/13/23 0433    Lab Status: Final result Specimen: Blood from Hand, Left Updated: 11/13/23 0505     WBC 7.00 Thousand/uL      RBC 3.92 Million/uL      Hemoglobin 11.8 g/dL      Hematocrit 36.0 %      MCV 92 fL      MCH 30.1 pg      MCHC 32.8 g/dL      RDW 14.1 %      Platelets 216 Thousands/uL      MPV 11.1 fL     Hemoglobin and Hematocrit, blood [[id number]]  (Normal) Collected: 11/12/23 2358    Lab Status: Final result Specimen: Blood from Hand, Left Updated: 11/13/23 0013     Hemoglobin 11.6 g/dL      Hematocrit 36.2 %     Hemoglobin and Hematocrit, blood [[id number]]  (Normal) Collected: 11/12/23 2049    Lab Status: Final result Specimen: Blood from Arm, Left Updated: 11/12/23 2058 Hemoglobin 12.7 g/dL      Hematocrit 39.5 %     Comprehensive metabolic panel [[id number]]  (Abnormal) Collected: 11/12/23 1150    Lab Status: Final result Specimen: Blood from Arm, Right Updated: 11/12/23 1230     Sodium 141 mmol/L      Potassium 3.6 mmol/L      Chloride 103 mmol/L      CO2 28 mmol/L      ANION GAP 10 mmol/L      BUN 17 mg/dL      Creatinine 1.10 mg/dL      Glucose 89 mg/dL      Calcium 9.2 mg/dL      AST 20 U/L      ALT 14 U/L      Alkaline Phosphatase 74 U/L      Total Protein 7.0 g/dL      Albumin 4.1 g/dL      Total Bilirubin 1.23 mg/dL      eGFR 46 ml/min/1.73sq m     Narrative:      [place] guidelines for Chronic Kidney Disease (CKD):     Stage 1 with normal or high GFR (GFR > 90 mL/min/1.73 square meters)    Stage 2 Mild CKD (GFR = 60-89 mL/min/1.73 square meters)    Stage 3A Moderate CKD (GFR = 45-59 mL/min/1.73 square meters)    Stage 3B Moderate CKD (GFR = 30-44 mL/min/1.73 square meters)    Stage 4 Severe CKD (GFR = 15-29 mL/min/1.73 square meters)    Stage 5 End Stage CKD (GFR <15 mL/min/1.73 square meters)  Note: GFR calculation is accurate only with a steady state creatinine    Protime-INR [[id number]]  (Abnormal) Collected: 11/12/23 1150    Lab Status: Final result Specimen: Blood from Arm, Right Updated: 11/12/23 1226     Protime 17.8 seconds      INR 1.39    APTT [[id number]]  (Normal) Collected: 11/12/23 1150    Lab Status: Final result Specimen: Blood from Arm, Right Updated: 11/12/23 1226     PTT 34 seconds     CBC and differential [[id number]] Collected: 11/12/23 1150    Lab Status: Final result Specimen: Blood from Arm, Right Updated: 11/12/23 1201     WBC 7.41 Thousand/uL      RBC 4.41 Million/uL      Hemoglobin 12.9 g/dL      Hematocrit 40.2 %      MCV 91 fL      MCH 29.3 pg      MCHC 32.1 g/dL      RDW 14.0 %      MPV 11.2 fL      Platelets 862 Thousands/uL      nRBC 0 /100 WBCs      Neutrophils Relative 70 %      Immat GRANS % 0 %      Lymphocytes Relative 15 %      Monocytes Relative 9 %      Eosinophils Relative 5 %      Basophils Relative 1 %      Neutrophils Absolute 5.21 Thousands/µL      Immature Grans Absolute 0.03 Thousand/uL      Lymphocytes Absolute 1.11 Thousands/µL      Monocytes Absolute 0.68 Thousand/µL      Eosinophils Absolute 0.34 Thousand/µL      Basophils Absolute 0.04 Thousands/µL                    XR ankle 3+ vw right   Final Result by Mc Mead MD (11/13 1556)      Soft tissue swelling extending from the lateral distal shin to the lateral hindfoot. No fracture or widening of the mortise or syndesmosis. Resident: Jakob Urias, the attending radiologist, have reviewed the images and agree with the final report above. Workstation performed: [id number]         CT head without contrast   Final Result by Alireza Bustamante MD (11/12 1403)      No acute intracranial abnormality. Workstation performed: [id number]         CT cervical spine without contrast   Final Result by Alireza Bustamante MD (11/12 1401)      No cervical spine fracture or traumatic malalignment. Partially visualized right pleural effusion. Workstation performed: [id number]         CT chest abdomen pelvis w contrast   Final Result by Alireza Bustamante MD (11/12 1412)      Soft tissue contusions lateral to the right shoulder and right hip without underlying fracture. Small right pleural effusion with partial loculation in the fissure. Gallstones without surrounding inflammation. Workstation performed: [id number]         XR shoulder 2+ views RIGHT   ED Interpretation by Rick Patel MD (11/12 1346)   No fracture or dislocation      Final Result by [address] [place] [address],  (11/13 3056)      No acute osseous abnormality.       Workstation performed: [id number]         XR femur 2 views RIGHT   ED Interpretation by Rick Patel MD (11/12 1764)   No fracture or loosening of hardware appreciated      Final Result by [facility] (11/13 4710)      No acute osseous abnormality. Workstation performed: [id number]         XR tibia fibula 2 views RIGHT   ED Interpretation by Ahmet Davalos MD (11/12 1400)   No fracture. Final Result by [facility] (11/13 9956)      No acute osseous abnormality. Workstation performed: [id number]         XR hand 3+ views RIGHT   ED Interpretation by Ahmet Davalos MD (11/12 1351)   Fracture at the base of the fifth metacarpal      Final Result by [facility] (11/13 6438)      Oblique fracture proximal fifth metacarpal.            Workstation performed: [id number]         XR wrist 3+ views RIGHT   ED Interpretation by Ahmet Davalos MD (11/12 1350)   No fracture or dislocation of the wrist.  Fracture at the base of the fifth meta carpal      Final Result by [facility] (11/13 1132)      Oblique fracture proximal fifth metacarpal.            Workstation performed: [id number]                    Procedures  Splint application    Date/Time: 11/12/2023 5:00 PM    Performed by: Ahmet Davalos MD  Authorized by: Ahmet Davalos MD  Universal Protocol:  Procedure performed by: (ED tech ALESSANDRA Velazco)  Consent given by: patient  Patient understanding: patient states understanding of the procedure being performed    Pre-procedure details:     Sensation:  Normal  Procedure details:     Laterality:  Right    Location:  Hand    Hand:  R hand    Splint type:  Ulnar gutter    Supplies:  Cotton padding, Ortho-Glass and elastic bandage  Post-procedure details:     Pain:  Unchanged    Sensation:  Normal    Patient tolerance of procedure: Tolerated well, no immediate complications           ED Course  ED Course as of 11/14/23 2222   Sun Nov 12, 2023   1144 Tetanus UTD.   Vaccine received in 2022   1342 Labs unremarkable. Normal CBC with hemoglobin of 12.9. Not anemic. Chemistry notable only for very minimally elevated bilirubin. 1400 CT reads pending. No gross ICH. No fracture appreciated of the right lower extremity. Knee hardware appears intact. No fracture of the shoulder appreciated on x-ray. She is able to range this well despite ecchymosis. Fracture appreciated at the base of the right fifth metacarpal.  No other fracture appreciated in hand/wrist.   1402 CT scans being reviewed by radiologist.   1414 Patient reports adequate pain control. At time of reassessment she notes that the most uncomfortable area is the left clavicle region where her collar is pressing. Ecchymosis over the right hip as well as the right calf have enlarged some. I did update her and her  who is currently at the bedside on study results which have returned including x-rays. While in their reports were rendered for CT head and C-spine. Cervical collar was cleared. She has not been able to ambulate since the fall today and anticipate she will at least require observation stay. She will be updated upon return of CT chest abdomen pelvis results. Right hand will be sutured at that point as well. 0 CT chest abdomen and pelvis report was rendered. She does have small right-sided pleural effusion. No fracture identified. I did speak with trauma attending Dr. Gracie Young. He asked that I reach out to his AP who will evaluate patient. She has not been able to ambulate since event and anticipates she will require admission. 18 Dr. Joseluis Azevedo will cleanse wound & close. R hand swelling has increased. DYANA Banks at bedside evaluating. Patient admitted to trauma service. Upon further evaluation of right hand skin defect it is noted that she has developed significant swelling and ecchymosis around this. Edges of skin injury are thin, well approximated and not amenable to suturing. Site is hemostatic.   Following irrigation site bandaged with topical antibiotic and dressing. Right ulnar gutter splint subsequently applied by ED tech. Trauma RONNIE had been in touch with orthopedics prior to its placement who was in agreement with plan for this. SBIRT 22yo+      Flowsheet Row Most Recent Value   Initial Alcohol Screen: US AUDIT-C     1. How often do you have a drink containing alcohol? 0 Filed at: 11/12/2023 1128   2. How many drinks containing alcohol do you have on a typical day you are drinking? 0 Filed at: 11/12/2023 1128   3a. Male UNDER 65: How often do you have five or more drinks on one occasion? 0 Filed at: 11/12/2023 1128   3b. FEMALE Any Age, or MALE 65+: How often do you have 4 or more drinks on one occassion? 0 Filed at: 11/12/2023 1128   Audit-C Score 0 Filed at: 11/12/2023 1128   DOREEN: How many times in the past year have you. .. Used an illegal drug or used a prescription medication for non-medical reasons? Never Filed at: 11/12/2023 1128                      Medical Decision Making  Amount and/or Complexity of Data Reviewed  Labs: ordered. Radiology: ordered and independent interpretation performed. Risk  OTC drugs. Prescription drug management.              Disposition  Final diagnoses:   Shoulder contusion   Laceration of right upper extremity, initial encounter   Nondisplaced fracture of base of fifth metacarpal bone, right hand, initial encounter for closed fracture   Contusion of right hip, initial encounter   Hematoma of right lower leg   Fall from standing, initial encounter   Anticoagulated     Time reflects when diagnosis was documented in both MDM as applicable and the Disposition within this note       Time User Action Codes Description Comment    11/12/2023  2:30 PM Christi YOUNG Add [S40.019A] Shoulder contusion     11/12/2023  3:04 PM Christi Cedenoer A Add [S41.111A] Laceration of right upper extremity, initial encounter     11/12/2023  3:04 PM Janna Gonzalez A Add Shae Bigger Nondisplaced fracture of base of fifth metacarpal bone, right hand, initial encounter for closed fracture     11/12/2023  3:04 PM Hellenon Florencemarcial Yuin A Add [S70.01XA] Contusion of right hip, initial encounter     11/12/2023  3:04 PM Janna Skkayla A Add [S80.11XA] Hematoma of right lower leg     11/12/2023  3:05 PM Janna Alexandreters A Add [W13. XXXA] Fall from standing, initial encounter     11/12/2023  3:05 PM Janna Skjacobters A Add [Z79.01] Anticoagulated     11/12/2023  3:36 PM Janette Holbrook Add [K62. XXXA] Fall, initial encounter           ED Disposition       None          Follow-up Information       Follow up With Specialties Details Why Contact Info Additional Information    Jeffrey Ramos MD Orthopedic Surgery, Hand Surgery Schedule an appointment as soon as possible for a visit in 1 week(s) Please follow-up with  [facility] in 1 week for reevaluation of your right hand injury. Please call with questions or concerns. [address] [facility], DO Family Medicine Schedule an appointment as soon as possible for a visit in 2 week(s) Please follow-up with your primary care provider in the next 2 weeks to review your hospital encounter. [address] [facility]uma Surgery Follow up Please follow-up in the trauma clinic as scheduled on 11/30/2023 at 12:45 PM for reevaluation of your hematomas. Please call with questions or concerns.  [address] [facility], [address], [address] [facility]            Discharge Medication List as of 11/13/2023  4:17 PM        START taking these medications    Details   acetaminophen (TYLENOL) 325 mg tablet Take 2 tablets (650 mg total) by mouth every 4 (four) hours as needed for mild pain, Starting Mon 11/13/2023, No Print      docusate sodium (COLACE) 100 mg capsule Take 1 capsule (100 mg total) by mouth 2 (two) times a day for 7 days, Starting Mon 11/13/2023, Until Mon 11/20/2023, Normal      oxyCODONE (ROXICODONE) 5 immediate release tablet Take 0.5-1 tablets (2.5-5 mg total) by mouth every 6 (six) hours as needed for severe pain or moderate pain for up to 3 days Max Daily Amount: 20 mg, Starting Mon 11/13/2023, Until Thu 11/16/2023 at 2359, Normal      polyethylene glycol (MIRALAX) 17 g packet Take 17 g by mouth daily as needed (Constipation) for up to 5 days, Starting Mon 11/13/2023, Until Sat 11/18/2023 at 2359, Normal      senna (SENOKOT) 8.6 mg Take 2 tablets (17.2 mg total) by mouth daily for 7 days Do not start before November 14, 2023., Starting Tue 11/14/2023, Until Tue 11/21/2023, Normal           CONTINUE these medications which have NOT CHANGED    Details   amiodarone 200 mg tablet TAKE 1/2 TABLET (100 MG TOTAL) BY MOUTH DAILY WITH BREAKFAST, Normal      atorvastatin (LIPITOR) 20 mg tablet TAKE 1 TABLET BY MOUTH EVERY DAY WITH DINNER, Normal      bumetanide (BUMEX) 2 mg tablet TAKE 1 TABLET BY MOUTH EVERY DAY, Normal      clotrimazole-betamethasone (LOTRISONE) 1-0.05 % cream APPLY TO AFFECTED AREA TWICE A DAY, Starting Tue 9/5/2023, Normal      hydrOXYzine HCL (ATARAX) 25 mg tablet Take 1 tablet (25 mg total) by mouth every 6 (six) hours as needed for itching, Starting Tue 7/18/2023, Normal      levETIRAcetam (KEPPRA) 500 mg tablet Take 1 tablet (500 mg total) by mouth every 12 (twelve) hours, Starting Mon 4/26/2021, Normal      metoprolol succinate (TOPROL-XL) 25 mg 24 hr tablet TAKE 1 TABLET BY MOUTH EVERY DAY, Normal      multivitamin (THERAGRAN) TABS Take 1 tablet by mouth daily. , Historical Med      nitroglycerin (NITROSTAT) 0.4 mg SL tablet Place 1 tablet (0.4 mg total) under the tongue every 5 (five) minutes as needed for chest pain, Starting Tue 2/23/2021, Normal      sertraline (ZOLOFT) 25 mg tablet TAKE 1 TABLET BY MOUTH EVERY DAY, Normal           STOP taking these medications       prasugrel (EFFIENT) tablet Comments:   Reason for Stopping:         Xarelto 15 MG tablet Comments:   Reason for Stopping:               Outpatient Discharge Orders   Discharge Diet     Shower Daily     No driving until     Call provider for:  persistent nausea or vomiting     Call provider for:  severe uncontrolled pain     Call provider for:  redness, tenderness, or signs of infection (pain, swelling, redness, odor or green/yellow discharge around incision site)     Call provider for: active or persistent bleeding     Call provider for:  difficulty breathing, headache or visual disturbances     Call provider for:  persistent dizziness or light-headedness     Call provider for:  extreme fatigue     Call provider for:     No strenuous exercise     Weight Bearing Restrictions       PDMP Review         Value Time User    PDMP Reviewed  Yes 11/12/2023  3:34 PM Nanci Meng PA-C            ED Provider  Electronically Signed by             Epifanio Peguero MD  11/14/23 [facility] Natalie Qiu

## 2023-11-15 NOTE — TELEPHONE ENCOUNTER
Hello,  Please advise if the following patient can be forced onto the schedule:    Patient: Eliza Olvera    : 1940    MRN: [id number]    Call back #: [phone]    Insurance: Medicare    Reason for appointment: Fractured finger; Injured 23, no New Patient blocks available    Requested doctor/location: [facility]      Thank you.

## 2023-11-17 ENCOUNTER — OFFICE VISIT (OUTPATIENT)
Dept: OBGYN CLINIC | Facility: HOSPITAL | Age: 83
End: 2023-11-17
Payer: MEDICARE

## 2023-11-17 VITALS
DIASTOLIC BLOOD PRESSURE: 79 MMHG | HEIGHT: 60 IN | SYSTOLIC BLOOD PRESSURE: 136 MMHG | WEIGHT: 133.2 LBS | BODY MASS INDEX: 26.15 KG/M2

## 2023-11-17 DIAGNOSIS — S62.306A CLOSED NONDISPLACED FRACTURE OF FIFTH METACARPAL BONE OF RIGHT HAND, UNSPECIFIED PORTION OF METACARPAL, INITIAL ENCOUNTER: Primary | ICD-10-CM

## 2023-11-17 PROCEDURE — 99203 OFFICE O/P NEW LOW 30 MIN: CPT | Performed by: ORTHOPAEDIC SURGERY

## 2023-11-17 NOTE — PATIENT INSTRUCTIONS
Splint with activities, dressing only when the splint is on   When at home at rest, take the splint off and there dressing off to allow the skin tear to heal   Splint may be removed for hygiene purposes and should be worn for sleep

## 2023-11-17 NOTE — PROGRESS NOTES
Assessment/Plan:  1. Closed nondisplaced fracture of fifth metacarpal bone of right hand, unspecified portion of metacarpal, initial encounter            Subjective history, physical examination performed, diagnostic imaging reviewed at today's visit  Diagnosis was discussed  Treatment recommendations were local wound care and use of TKO splint for immobilization of the fifth MC base fracture. Risks, benefits, and realistic expectations for treatment options were discussed. The patient was given an opportunity to ask questions. Questions were answered to the patient's satisfaction. Through shared decision making, the patient decided to move forward with nonoperative management in the form of a TKO brace and a dry dressing to the skin tear. The dry dressing consists of abraham wrap. She was advised to remove the splint at rest, when at home, to allow the skin tear to air out as there is slight maceration to this area. When she has the splint on she may use the silver dressing that she has at home or abraham wrap. A dry dressing or the silver dressing is only needed when she is wearing the TKO splint. Follow up in 2 weeks time with right hand x-rays, she may follow up with Dr Austin Ellison at [facility] as this is a close location for her. cc: I hurt my right hand     Subjective:   Ritu Dodson is a right hand dominant 79 y.o. female who presents to the office for a right hand fracture. She sustained a fall on 11/12/23. She presented to the ED after injury. X-rays were performed at that time and she was placed into a splint. She tolerated splinting well. She is taking Tylenol as needed for pain control. Review of Systems   Constitutional:  Negative for chills, fever and unexpected weight change. HENT:  Negative for hearing loss, nosebleeds and sore throat. Eyes:  Negative for pain, redness and visual disturbance. Respiratory:  Negative for cough, shortness of breath and wheezing. Cardiovascular:  Negative for chest pain, palpitations and leg swelling. Gastrointestinal:  Negative for abdominal pain, nausea and vomiting. Endocrine: Negative for polydipsia and polyuria. Genitourinary:  Negative for difficulty urinating and hematuria. Musculoskeletal:  Negative for arthralgias, joint swelling and myalgias. Skin:  Negative for rash and wound. Neurological:  Negative for dizziness, numbness and headaches. Psychiatric/Behavioral:  Negative for decreased concentration, dysphoric mood and suicidal ideas. The patient is not nervous/anxious. Past Medical History:   Diagnosis Date    Anal fissure     Cardiac disorder     Cognitive changes 12/23/2020    Esophageal reflux     Esophagitis, reflux     Hemorrhoids     Hepatic hemangioma     Last Assessed: 1/13/2015    Herpes zoster     History of colonic polyps     Hypertension     Ischemic colitis ([address])     Lumbar herniated disc     Malignant neoplasm without specification of site ([address])     Nephrolithiasis     L.  Lithotripsy    Nontoxic single thyroid nodule     Last Assessed: 1/13/2015    Osteoarthritis     Overactive bladder     Raynaud disease     Respiratory system disease     Sjogren's disease ([address])     Spinal stenosis     PONCHO (stress urinary incontinence, female)     Uterovaginal prolapse     Grade I-II       Past Surgical History:   Procedure Laterality Date    APPENDECTOMY  1947    CARDIAC PACEMAKER PLACEMENT  01/2021    CARDIAC SURGERY      CABG    CATARACT EXTRACTION Bilateral     COLONOSCOPY  2012    Fiberoptic    COLONOSCOPY      Resolved: 2006 - 2012 5 year f/u    CORONARY ANGIOPLASTY WITH STENT PLACEMENT      CORONARY ARTERY BYPASS GRAFT      Resolved: 2012    ESOPHAGOGASTRODUODENOSCOPY  2012    Diagnostic    HEMORROIDECTOMY      KNEE SURGERY      LITHOTRIPSY      Renal    MALIGNANT SKIN LESION EXCISION      Face; Resolved: 2004    ME ESOPHAGOGASTRODUODENOSCOPY TRANSORAL DIAGNOSTIC N/A 4/13/2016    Procedure: EGD AND COLONOSCOPY;  Surgeon: Jono Vences MD;  Location: [facility]; Service: Gastroenterology    RENAL ARTERY STENT      SKIN LESION EXCISION      Scalp    SOFT TISSUE TUMOR RESECTION      Shoulder; Resolved: 1995    THROMBOLYSIS      Postoperative Thrombolysis PTCA    TONSILLECTOMY      Resolved: 1944       Family History   Problem Relation Age of Onset    Heart disease Mother     Hypertension Mother     Osteoporosis Mother     Heart disease Father     Hypertension Father     No Known Problems Sister     Heart disease Brother     Diabetes Brother     No Known Problems Daughter     No Known Problems Son     No Known Problems Maternal Grandmother     No Known Problems Maternal Grandfather     No Known Problems Paternal Grandmother     No Known Problems Paternal Grandfather     Ulcerative colitis Family     Colon polyps Family     Breast cancer Neg Hx     Colon cancer Neg Hx     Ovarian cancer Neg Hx        Social History     Occupational History    Occupation: retired   Tobacco Use    Smoking status: Never    Smokeless tobacco: Never   Vaping Use    Vaping Use: Never used   Substance and Sexual Activity    Alcohol use:  Yes     Alcohol/week: 1.0 standard drink of alcohol     Types: 1 Glasses of wine per week     Comment: occasionally, but no alcohol intake recently    Drug use: Never    Sexual activity: Not Currently         Current Outpatient Medications:     acetaminophen (TYLENOL) 325 mg tablet, Take 2 tablets (650 mg total) by mouth every 4 (four) hours as needed for mild pain, Disp: , Rfl: 0    amiodarone 200 mg tablet, TAKE 1/2 TABLET (100 MG TOTAL) BY MOUTH DAILY WITH BREAKFAST, Disp: 15 tablet, Rfl: 8    atorvastatin (LIPITOR) 20 mg tablet, TAKE 1 TABLET BY MOUTH EVERY DAY WITH DINNER, Disp: 90 tablet, Rfl: 4    bumetanide (BUMEX) 2 mg tablet, TAKE 1 TABLET BY MOUTH EVERY DAY, Disp: 30 tablet, Rfl: 8    clotrimazole-betamethasone (LOTRISONE) 1-0.05 % cream, APPLY TO AFFECTED AREA TWICE A DAY, Disp: 30 g, Rfl: 0 hydrOXYzine HCL (ATARAX) 25 mg tablet, Take 1 tablet (25 mg total) by mouth every 6 (six) hours as needed for itching, Disp: 30 tablet, Rfl: 0    metoprolol succinate (TOPROL-XL) 25 mg 24 hr tablet, TAKE 1 TABLET BY MOUTH EVERY DAY, Disp: 30 tablet, Rfl: 8    multivitamin (THERAGRAN) TABS, Take 1 tablet by mouth daily. , Disp: , Rfl:     nitroglycerin (NITROSTAT) 0.4 mg SL tablet, Place 1 tablet (0.4 mg total) under the tongue every 5 (five) minutes as needed for chest pain, Disp: 25 tablet, Rfl: 0    polyethylene glycol (MIRALAX) 17 g packet, Take 17 g by mouth daily as needed (Constipation) for up to 5 days, Disp: 85 g, Rfl: 0    rivaroxaban (Xarelto) 15 mg tablet, Take 15 mg by mouth, Disp: , Rfl:     senna (SENOKOT) 8.6 mg, Take 2 tablets (17.2 mg total) by mouth daily for 7 days Do not start before November 14, 2023., Disp: 14 tablet, Rfl: 0    sertraline (ZOLOFT) 25 mg tablet, TAKE 1 TABLET BY MOUTH EVERY DAY, Disp: 90 tablet, Rfl: 0    levETIRAcetam (KEPPRA) 500 mg tablet, Take 1 tablet (500 mg total) by mouth every 12 (twelve) hours (Patient not taking: Reported on 11/17/2023), Disp: 60 tablet, Rfl: 3    Allergies   Allergen Reactions    Sulfa Antibiotics Anaphylaxis    Formaldehyde     Codeine Palpitations       Objective:  Vitals:    11/17/23 1314   BP: 136/79       The patient was awake, alert, and oriented to person, place, and time. No acute distress. Normocephalic. EOMI. Mucous membranes moist.  Normal respiratory effort. Examination of the affected extremity was compared to the unaffected extremity. No deformity. Hand and fingers were warm and well-perfused. Capillary refill was brisk. Full active range of motion of the elbows, forearms, wrists, and fingers. Diffuse ecchymosis to the upper extremity, Y-shape skin tear to the palm of the hand, base of the small finger, slight maceration to the skin tear, full finger extension, loose composite fist, no rotational malalignment.  +TTP at base of [facility]. Imaging/Diagnostic Studies:    I reviewed imaging studies dated 11/12/23 which included multiple views. These images studies demonstrated a right 5th metacarpal fracture, nondisplaced. Scribe Attestation      I,:  Maurice Ta am acting as a scribe while in the presence of the attending physician.:       I,:  Olga Rossi MD personally performed the services described in this documentation    as scribed in my presence.:               This document was created using speech voice recognition software. Grammatical errors, random word insertions, pronoun errors, and incomplete sentences are an occasional consequence of this system due to software limitations, ambient noise, and hardware issues. Any formal questions or concerns about content, text, or information contained within the body of this dictation should be directly addressed to the provider for clarification.

## 2023-11-20 ENCOUNTER — HOME HEALTH ADMISSION (OUTPATIENT)
Dept: HOME HEALTH SERVICES | Facility: HOME HEALTHCARE | Age: 83
End: 2023-11-20

## 2023-11-20 ENCOUNTER — HOME CARE VISIT (OUTPATIENT)
Dept: HOME HEALTH SERVICES | Facility: HOME HEALTHCARE | Age: 83
End: 2023-11-20

## 2023-11-20 ENCOUNTER — OFFICE VISIT (OUTPATIENT)
Dept: FAMILY MEDICINE CLINIC | Facility: MEDICAL CENTER | Age: 83
End: 2023-11-20
Payer: MEDICARE

## 2023-11-20 ENCOUNTER — APPOINTMENT (OUTPATIENT)
Dept: LAB | Facility: MEDICAL CENTER | Age: 83
End: 2023-11-20
Payer: MEDICARE

## 2023-11-20 VITALS
WEIGHT: 134.8 LBS | TEMPERATURE: 97.9 F | DIASTOLIC BLOOD PRESSURE: 78 MMHG | SYSTOLIC BLOOD PRESSURE: 142 MMHG | BODY MASS INDEX: 26.46 KG/M2 | HEART RATE: 78 BPM | OXYGEN SATURATION: 94 % | HEIGHT: 60 IN

## 2023-11-20 DIAGNOSIS — Z91.81 HISTORY OF FALL: ICD-10-CM

## 2023-11-20 DIAGNOSIS — T14.8XXA HEMATOMA: ICD-10-CM

## 2023-11-20 DIAGNOSIS — Z76.89 ENCOUNTER FOR SUPPORT AND COORDINATION OF TRANSITION OF CARE: Primary | ICD-10-CM

## 2023-11-20 LAB
BASOPHILS # BLD AUTO: 0.06 THOUSANDS/ÂΜL (ref 0–0.1)
BASOPHILS NFR BLD AUTO: 1 % (ref 0–1)
EOSINOPHIL # BLD AUTO: 0.29 THOUSAND/ÂΜL (ref 0–0.61)
EOSINOPHIL NFR BLD AUTO: 4 % (ref 0–6)
ERYTHROCYTE [DISTWIDTH] IN BLOOD BY AUTOMATED COUNT: 14 % (ref 11.6–15.1)
HCT VFR BLD AUTO: 37.5 % (ref 34.8–46.1)
HGB BLD-MCNC: 12.2 G/DL (ref 11.5–15.4)
IMM GRANULOCYTES # BLD AUTO: 0.04 THOUSAND/UL (ref 0–0.2)
IMM GRANULOCYTES NFR BLD AUTO: 1 % (ref 0–2)
LYMPHOCYTES # BLD AUTO: 1.12 THOUSANDS/ÂΜL (ref 0.6–4.47)
LYMPHOCYTES NFR BLD AUTO: 16 % (ref 14–44)
MCH RBC QN AUTO: 30.3 PG (ref 26.8–34.3)
MCHC RBC AUTO-ENTMCNC: 32.5 G/DL (ref 31.4–37.4)
MCV RBC AUTO: 93 FL (ref 82–98)
MONOCYTES # BLD AUTO: 0.6 THOUSAND/ÂΜL (ref 0.17–1.22)
MONOCYTES NFR BLD AUTO: 9 % (ref 4–12)
NEUTROPHILS # BLD AUTO: 4.8 THOUSANDS/ÂΜL (ref 1.85–7.62)
NEUTS SEG NFR BLD AUTO: 69 % (ref 43–75)
NRBC BLD AUTO-RTO: 0 /100 WBCS
PLATELET # BLD AUTO: 230 THOUSANDS/UL (ref 149–390)
PMV BLD AUTO: 11.3 FL (ref 8.9–12.7)
RBC # BLD AUTO: 4.02 MILLION/UL (ref 3.81–5.12)
WBC # BLD AUTO: 6.91 THOUSAND/UL (ref 4.31–10.16)

## 2023-11-20 PROCEDURE — 85025 COMPLETE CBC W/AUTO DIFF WBC: CPT

## 2023-11-20 PROCEDURE — 36415 COLL VENOUS BLD VENIPUNCTURE: CPT

## 2023-11-20 PROCEDURE — 99496 TRANSJ CARE MGMT HIGH F2F 7D: CPT | Performed by: STUDENT IN AN ORGANIZED HEALTH CARE EDUCATION/TRAINING PROGRAM

## 2023-11-20 NOTE — CASE COMMUNICATION
PC to pt to schedule home PT [facility] visit. Pt reports she is ambulating (I)ly in the home, able to get out of the home (I)ly, is feeling much better since the recent fall and injury and is not homebound. Pt reports no questions and/or concerns about current medication regimen. No skilled home PT home services needed at this time 2* reasons listed above. DC pt from skilled home PT and pt is in agreement.     Thank you,  Sarahy Hung an  DPT

## 2023-11-20 NOTE — PROGRESS NOTES
[facility] - Clinic Note  Jodi Hardy [place], 23     Gwendolyn Meade MRN: [id number] : 1940 Age: 79 y.o. Assessment/Plan     1. Encounter for support and coordination of transition of care    -Patient presents for transition of care appointment after recent hospitalization after a fall, she sustained multiple contusions and hand fracture  -Patient has had follow-up since discharge with orthopedics on 2023, she has another follow-up with them on 2023  -Has an appointment with trauma surgery on 2023 for reevaluation of multiple hematomas  -Patient was instructed to hold antiplatelet and anticoagulant until 2023  -Order placed for home PT    2. History of fall    - Referral to [address]; Future    3. Hematoma    - CBC and differential; Future      Gwendolyn Meade acknowledged understanding of treatment plan, all questions answered. Subjective      Gwendolyn Meade is a 79 y.o. female who presents for transition of care appointment. Patient was hospitalized at [address] [place] [address] from 2023 to 2023 after a fall, she sustained multiple contusions and noted to have fracture of right proximal fifth metacarpal.  Patient presents with her  who also contributes to history. They have since followed up with orthopedics since discharge. Patient confirms the fall was purely mechanical.  Other than some pain related to hematoma on right lower extremity, no acute complaints today. Hospital course per discharge summary:    Gwendolyn Meade is a 79-year-old female who presented to the emergency department following a fall over a concrete barrier while getting [place] [employer] on her right side. She did not hit her head or lose consciousness. She complains of right sided pain including in her shoulder, hand, hip and right lower leg.   During her initial evaluation by the trauma service, her primary survey is unremarkable. On secondary survey, she was afebrile with normal vital signs; she had tenderness over her right shoulder, right hip and right lower leg at sites of hematomas with intact skin and no evidence of active or ongoing bleeding. She did have pain with range of motion of her right hand as well as swelling and tenderness over the right fifth metacarpal with some ecchymosis as well and a laceration at the base of the palmar aspect of the right small finger; the remainder of her exam was unremarkable. Her initial work-up included labs and the above and imaging studies. 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 Orthopedic surgery and geriatric medicine services were consulted to assist with her work-up and management during her hospital encounter. Orthopedic surgery recommended nonoperative management for right hand fracture and maintaining nonweightbearing status in a splint with outpatient follow-up in 1 week. She had serial hemoglobin checks and monitoring of her hematomas which demonstrated no evidence of active or ongoing bleeding during her hospital encounter. She was able to have her pain managed with oral medications. PT and OT evaluated her and cleared her for discharge home with home versus outpatient therapy and use of the cane. Case management assisting with disposition planning. The patient was deemed stable for discharge on 11/13/2023. On discharge, the patient is instructed to follow-up with the patient's primary care provider to review the events of the patient's recent hospitalization.  The patient is instructed to follow-up in the Trauma Clinic as scheduled on 11/30/2023 at 12:45 PM.  The patient is instructed to follow-up with Hand surgery in 1 week for reevaluation of her right hand injury. .  The patient should follow the provided discharge instructions. TCM Call       Date and time call was made  11/14/2023  9:07 AM    Hospital care reviewed  Records reviewed    Patient was hospitialized at  [address] [place] [address]    Date of Admission  11/12/23    Date of discharge  11/13/23    Diagnosis  Multiple contusions    Disposition  Home    Were the patients medications reviewed and updated  No    Current Symptoms  None          TCM Call       Post hospital issues  None    Should patient be enrolled in anticoag monitoring? No    Scheduled for follow up?   Yes    Not clinically warranted  still admitted    Patient refusal reason  Seeing her specialists- saw vascular yesterday and has f/u appt with Dr Masoud Garcia    Patients specialists  Other (comment)    Other specialists names  Julio C Jonas    Did you obtain your prescribed medications  Yes    Do you need help managing your prescriptions or medications  No    Is transportation to your appointment needed  No    I have advised the patient to call PCP with any new or worsening symptoms  [address] or [employer] other    Support System  Partner    The type of support provided  Emotional; Physical    Are you recieving any outpatient services  No    Are you recieving home care services  Yes    Types of home care services  Home PT; Nurse visit    Have you fallen in the last 12 months  No    Counseling  Family                The following portions of the patient's history were reviewed and updated as appropriate: allergies, current medications, past family history, past medical history, past social history, past surgical history and problem list.     Past Medical History:   Diagnosis Date    Anal fissure     Cardiac disorder     Cognitive changes 12/23/2020    Esophageal reflux     Esophagitis, reflux     Hemorrhoids     Hepatic hemangioma     Last Assessed: 1/13/2015    Herpes zoster     History of colonic polyps     Hypertension     Ischemic colitis ([address])     Lumbar herniated disc     Malignant neoplasm without specification of site ([address])     Nephrolithiasis     L. Lithotripsy    Nontoxic single thyroid nodule     Last Assessed: 1/13/2015    Osteoarthritis     Overactive bladder     Raynaud disease     Respiratory system disease     Sjogren's disease ([address])     Spinal stenosis     PONCHO (stress urinary incontinence, female)     Uterovaginal prolapse     Grade I-II       Allergies   Allergen Reactions    Sulfa Antibiotics Anaphylaxis    Formaldehyde     Codeine Palpitations       Past Surgical History:   Procedure Laterality Date    APPENDECTOMY  1947    CARDIAC PACEMAKER PLACEMENT  01/2021    CARDIAC SURGERY      CABG    CATARACT EXTRACTION Bilateral     COLONOSCOPY  2012    Fiberoptic    COLONOSCOPY      Resolved: 2006 - 2012 5 year f/u    CORONARY ANGIOPLASTY WITH STENT PLACEMENT      CORONARY ARTERY BYPASS GRAFT      Resolved: 2012    ESOPHAGOGASTRODUODENOSCOPY  2012    Diagnostic    HEMORROIDECTOMY      KNEE SURGERY      LITHOTRIPSY      Renal    MALIGNANT SKIN LESION EXCISION      Face; Resolved: 2004    SC ESOPHAGOGASTRODUODENOSCOPY TRANSORAL DIAGNOSTIC N/A 4/13/2016    Procedure: EGD AND COLONOSCOPY;  Surgeon: Sofia Potter MD;  Location: [facility];   Service: Gastroenterology    RENAL ARTERY STENT      SKIN LESION EXCISION      Scalp    SOFT TISSUE TUMOR RESECTION      Shoulder; Resolved: 1995    THROMBOLYSIS      Postoperative Thrombolysis PTCA    TONSILLECTOMY      Resolved: 1944       Family History   Problem Relation Age of Onset    Heart disease Mother     Hypertension Mother     Osteoporosis Mother     Heart disease Father     Hypertension Father     No Known Problems Sister     Heart disease Brother     Diabetes Brother     No Known Problems Daughter     No Known Problems Son     No Known Problems Maternal Grandmother     No Known Problems Maternal Grandfather     No Known Problems Paternal Grandmother     No Known Problems Paternal Grandfather     Ulcerative colitis Family     Colon polyps Family     Breast cancer Neg Hx     Colon cancer Neg Hx     Ovarian cancer Neg Hx        Social History     Socioeconomic History    Marital status: /Civil Union     Spouse name: None    Number of children: None    Years of education: None    Highest education level: None   Occupational History    Occupation: retired   Tobacco Use    Smoking status: Never    Smokeless tobacco: Never   Vaping Use    Vaping Use: Never used   Substance and Sexual Activity    Alcohol use: Yes     Alcohol/week: 1.0 standard drink of alcohol     Types: 1 Glasses of wine per week     Comment: occasionally, but no alcohol intake recently    Drug use: Never    Sexual activity: Not Currently   Other Topics Concern    None   Social History Narrative    Activities: golf    Caffeine use    Consumes healthy diet    Daily caffeinated coffee consumption: 1 cup per day    Drinks caffeinated tea: 1 cup per day    Well balanced diet     Social Determinants of Health     Financial Resource Strain: Low Risk  (7/18/2023)    Overall Financial Resource Strain (CARDIA)     Difficulty of Paying Living Expenses: Not hard at all   Food Insecurity: Not on file   Transportation Needs: No Transportation Needs (7/18/2023)    PRAPARE - Transportation     Lack of Transportation (Medical): No     Lack of Transportation (Non-Medical):  No   Physical Activity: Not on file   Stress: Not on file   Social Connections: Not on file   Intimate Partner Violence: Not on file   Housing Stability: Not on file       Current Outpatient Medications   Medication Sig Dispense Refill    amiodarone 200 mg tablet TAKE 1/2 TABLET (100 MG TOTAL) BY MOUTH DAILY WITH BREAKFAST 15 tablet 8    atorvastatin (LIPITOR) 20 mg tablet TAKE 1 TABLET BY MOUTH EVERY DAY WITH DINNER 90 tablet 4    bumetanide (BUMEX) 2 mg tablet TAKE 1 TABLET BY MOUTH EVERY DAY 30 tablet 8    metoprolol succinate (TOPROL-XL) 25 mg 24 hr tablet TAKE 1 TABLET BY MOUTH EVERY DAY 30 tablet 8    multivitamin (THERAGRAN) TABS Take 1 tablet by mouth daily. nitroglycerin (NITROSTAT) 0.4 mg SL tablet Place 1 tablet (0.4 mg total) under the tongue every 5 (five) minutes as needed for chest pain 25 tablet 0    sertraline (ZOLOFT) 25 mg tablet TAKE 1 TABLET BY MOUTH EVERY DAY 90 tablet 0    acetaminophen (TYLENOL) 325 mg tablet Take 2 tablets (650 mg total) by mouth every 4 (four) hours as needed for mild pain  0    clotrimazole-betamethasone (LOTRISONE) 1-0.05 % cream APPLY TO AFFECTED AREA TWICE A DAY 30 g 0    hydrOXYzine HCL (ATARAX) 25 mg tablet Take 1 tablet (25 mg total) by mouth every 6 (six) hours as needed for itching 30 tablet 0    levETIRAcetam (KEPPRA) 500 mg tablet Take 1 tablet (500 mg total) by mouth every 12 (twelve) hours 60 tablet 3    polyethylene glycol (MIRALAX) 17 g packet Take 17 g by mouth daily as needed (Constipation) for up to 5 days 85 g 0    rivaroxaban (Xarelto) 15 mg tablet Take 15 mg by mouth (Patient not taking: Reported on 11/20/2023)      senna (SENOKOT) 8.6 mg Take 2 tablets (17.2 mg total) by mouth daily for 7 days Do not start before November 14, 2023. 14 tablet 0     No current facility-administered medications for this visit. Review of Systems     As noted in HPI    Objective      /78 (BP Location: Left arm, Patient Position: Sitting, Cuff Size: Large)   Pulse 78   Temp 97.9 °F (36.6 °C)   Ht 5' (1.524 m)   Wt 61.1 kg (134 lb 12.8 oz)   SpO2 94%   BMI 26.33 kg/m²     Physical Exam  Vitals reviewed. Constitutional:       General: She is not in acute distress. Appearance: Normal appearance. HENT:      Head: Normocephalic and atraumatic.       Mouth/Throat:      Mouth: Mucous membranes are moist.      Pharynx: Oropharynx is clear. Eyes:      Extraocular Movements: Extraocular movements intact. Conjunctiva/sclera: Conjunctivae normal.      Pupils: Pupils are equal, round, and reactive to light. Cardiovascular:      Rate and Rhythm: Normal rate. Pulses: Normal pulses. Heart sounds: Normal heart sounds. Pulmonary:      Effort: Pulmonary effort is normal.      Breath sounds: Normal breath sounds. Musculoskeletal:      Cervical back: Neck supple. Comments: Right upper extremity splint in place, sensation to light touch intact right upper extremity, normal capillary refill    Skin:     General: Skin is warm and dry. Capillary Refill: Capillary refill takes less than 2 seconds. Findings: Bruising present. Comments: Multiple contusions right-sided and hematoma anterior aspect right lower leg, tender   Neurological:      Mental Status: She is alert and oriented to person, place, and time. Psychiatric:         Mood and Affect: Mood normal.         Behavior: Behavior normal.         Thought Content: Thought content normal.             Some portions of this record may have been generated with voice recognition software. There may be translation, syntax, or grammatical errors. Occasional wrong word or "sound-a-like" substitutions may have occurred due to the inherent limitations of the voice recognition software. Read the chart carefully and recognize, using context, where substations may have occurred. If you have any questions, please contact the dictating provider for clarification or correction, as needed.

## 2023-11-21 ENCOUNTER — TELEPHONE (OUTPATIENT)
Dept: FAMILY MEDICINE CLINIC | Facility: MEDICAL CENTER | Age: 83
End: 2023-11-21

## 2023-11-21 NOTE — TELEPHONE ENCOUNTER
----- Message from Yasmani Battle GroundDO sent at 11/21/2023  8:47 AM EST -----  Please inform patient that CBC shows no anemia.

## 2023-11-25 DIAGNOSIS — F41.8 DEPRESSION WITH ANXIETY: ICD-10-CM

## 2023-11-27 RX ORDER — SERTRALINE HYDROCHLORIDE 25 MG/1
TABLET, FILM COATED ORAL
Qty: 90 TABLET | Refills: 1 | Status: ON HOLD | OUTPATIENT
Start: 2023-11-27

## 2023-11-30 ENCOUNTER — APPOINTMENT (OUTPATIENT)
Dept: RADIOLOGY | Facility: MEDICAL CENTER | Age: 83
End: 2023-11-30
Payer: MEDICARE

## 2023-11-30 ENCOUNTER — OFFICE VISIT (OUTPATIENT)
Dept: FAMILY MEDICINE CLINIC | Facility: MEDICAL CENTER | Age: 83
End: 2023-11-30
Payer: MEDICARE

## 2023-11-30 ENCOUNTER — OFFICE VISIT (OUTPATIENT)
Dept: SURGERY | Facility: CLINIC | Age: 83
End: 2023-11-30
Payer: MEDICARE

## 2023-11-30 VITALS — WEIGHT: 135.4 LBS | TEMPERATURE: 97.8 F | HEIGHT: 60 IN | BODY MASS INDEX: 26.58 KG/M2

## 2023-11-30 VITALS
HEART RATE: 74 BPM | OXYGEN SATURATION: 94 % | HEIGHT: 60 IN | BODY MASS INDEX: 26.5 KG/M2 | DIASTOLIC BLOOD PRESSURE: 72 MMHG | WEIGHT: 135 LBS | SYSTOLIC BLOOD PRESSURE: 140 MMHG | TEMPERATURE: 97.9 F

## 2023-11-30 DIAGNOSIS — R06.89 ABNORMAL BREATH SOUNDS: ICD-10-CM

## 2023-11-30 DIAGNOSIS — R06.89 ABNORMAL BREATH SOUNDS: Primary | ICD-10-CM

## 2023-11-30 DIAGNOSIS — R05.1 ACUTE COUGH: ICD-10-CM

## 2023-11-30 DIAGNOSIS — S80.11XD LEG HEMATOMA, RIGHT, SUBSEQUENT ENCOUNTER: Primary | ICD-10-CM

## 2023-11-30 DIAGNOSIS — R05.1 ACUTE COUGH: Primary | ICD-10-CM

## 2023-11-30 DIAGNOSIS — R09.82 POST-NASAL DRIP: ICD-10-CM

## 2023-11-30 DIAGNOSIS — J34.89 RHINORRHEA: ICD-10-CM

## 2023-11-30 PROCEDURE — 99211 OFF/OP EST MAY X REQ PHY/QHP: CPT | Performed by: SURGERY

## 2023-11-30 PROCEDURE — 71046 X-RAY EXAM CHEST 2 VIEWS: CPT

## 2023-11-30 PROCEDURE — 99214 OFFICE O/P EST MOD 30 MIN: CPT

## 2023-11-30 RX ORDER — BENZONATATE 100 MG/1
100 CAPSULE ORAL 3 TIMES DAILY PRN
Qty: 20 CAPSULE | Refills: 0 | Status: ON HOLD | OUTPATIENT
Start: 2023-11-30

## 2023-11-30 RX ORDER — DOXYCYCLINE HYCLATE 100 MG/1
100 CAPSULE ORAL EVERY 12 HOURS SCHEDULED
Qty: 14 CAPSULE | Refills: 0 | Status: ON HOLD | OUTPATIENT
Start: 2023-11-30 | End: 2023-12-07

## 2023-11-30 RX ORDER — IPRATROPIUM BROMIDE 21 UG/1
2 SPRAY, METERED NASAL EVERY 12 HOURS
Qty: 30 ML | Refills: 0 | Status: ON HOLD | OUTPATIENT
Start: 2023-11-30

## 2023-11-30 NOTE — PROGRESS NOTES
Assessment/Plan:    Tessalon Perles prescribed as below. Ipratropium nasal spray prescribed as below. Stat chest x-ray ordered due to abnormal breath sounds. 1. Acute cough  Stat chest x-ray. Tessalon Perles 3 times daily as needed for cough. May continue Coricidin as needed. - XR chest pa & lateral; Future  - benzonatate (TESSALON PERLES) 100 mg capsule; Take 1 capsule (100 mg total) by mouth 3 (three) times a day as needed for cough  Dispense: 20 capsule; Refill: 0    2. Abnormal breath sounds  Stat chest x-ray. - XR chest pa & lateral; Future    3. Rhinorrhea  Ipratropium nasal spray as directed. - ipratropium (ATROVENT) 0.03 % nasal spray; 2 sprays into each nostril every 12 (twelve) hours  Dispense: 30 mL; Refill: 0    4. Post-nasal drip  Ipratropium nasal spray as directed. - ipratropium (ATROVENT) 0.03 % nasal spray; 2 sprays into each nostril every 12 (twelve) hours  Dispense: 30 mL; Refill: 0      Subjective:      Patient ID: Matthias Castle is a 79 y.o. female. 79year old female presents with complaint of productive cough, post nasal drip, runny nose x 3 days. Denies any other associated sx. Denies sob, cp, ear pain, sore throat, fever. Did not take a home covid test.  She has been using coricidin otc which does give her some relief. Denies sick contacts. Cough is not keeping her up at night. The following portions of the patient's history were reviewed and updated as appropriate: current medications, past family history, past medical history, past social history, past surgical history, and problem list.    Review of Systems   Constitutional: Negative. HENT:  Positive for postnasal drip and rhinorrhea. Eyes: Negative. Respiratory:  Positive for cough. Negative for shortness of breath and wheezing. Cardiovascular: Negative. Gastrointestinal: Negative. Endocrine: Negative. Genitourinary: Negative. Musculoskeletal: Negative. Skin: Negative. Allergic/Immunologic: Negative. Neurological: Negative. Hematological: Negative. Psychiatric/Behavioral: Negative. Objective:      /72 (BP Location: Left arm, Patient Position: Sitting, Cuff Size: Adult)   Pulse 74   Temp 97.9 °F (36.6 °C)   Ht 5' (1.524 m)   Wt 61.2 kg (135 lb)   SpO2 94%   BMI 26.37 kg/m²          Physical Exam  Vitals and nursing note reviewed. Constitutional:       General: She is not in acute distress. Appearance: Normal appearance. She is not ill-appearing, toxic-appearing or diaphoretic. HENT:      Head: Normocephalic and atraumatic. Right Ear: There is impacted cerumen. Left Ear: Tympanic membrane, ear canal and external ear normal.      Nose: Nose normal.      Right Sinus: No maxillary sinus tenderness or frontal sinus tenderness. Left Sinus: No maxillary sinus tenderness or frontal sinus tenderness. Mouth/Throat:      Mouth: Mucous membranes are moist.      Pharynx: Oropharynx is clear. Eyes:      Conjunctiva/sclera: Conjunctivae normal.   Cardiovascular:      Rate and Rhythm: Normal rate and regular rhythm. Pulses: Normal pulses. Heart sounds: Normal heart sounds. Pulmonary:      Effort: Pulmonary effort is normal. No respiratory distress. Breath sounds: Examination of the right-upper field reveals rhonchi. Examination of the left-upper field reveals rhonchi. Examination of the right-middle field reveals rhonchi. Examination of the left-middle field reveals rhonchi. Examination of the right-lower field reveals rhonchi. Examination of the left-lower field reveals rhonchi. Rhonchi (diffuse) present. Skin:     General: Skin is warm and dry. Neurological:      General: No focal deficit present. Mental Status: She is alert.    Psychiatric:         Mood and Affect: Mood normal.                    Augusto Aldridge

## 2023-11-30 NOTE — ASSESSMENT & PLAN NOTE
- Right lower leg lateral hematoma s/p fall 11/12  - Please reference media for photo of hematoma  - Soft, compressible, tender hematoma with overlying skin intact  - No evidence of purulent drainage, erythematic borders, increased warmth, or vascular compromise  - Compartments soft throughout, pulses intact  - Ambulating independently, had home PT whom have signed off  - Last Hgb check stable  - Cleared to start AC/AP as she was previously taking  - Recommend ACE compression, elevating the extremity and applying a heating pad x 20 mins on/ off  - No follow up in trauma clinic required, please call with questions or concerns regarding hematoma

## 2023-11-30 NOTE — PATIENT INSTRUCTIONS
Use ipratropium nasal spray as directed. May continue Coricidin as needed. Use Tessalon Perles as needed for cough. Please have your chest x-ray done now, we will call with results and further treatment if necessary. If symptoms worsen or fail to improve, please return for reevaluation or call the office.

## 2023-11-30 NOTE — PROGRESS NOTES
Office Visit - General Surgery  Freida Palencia MRN: [id number]  Encounter: [id number]    Assessment and Plan  Problem List Items Addressed This Visit          Other    Leg hematoma, right, subsequent encounter - Primary     - Right lower leg lateral hematoma s/p fall 11/12  - Please reference media for photo of hematoma  - Soft, compressible, tender hematoma with overlying skin intact  - No evidence of purulent drainage, erythematic borders, increased warmth, or vascular compromise  - Compartments soft throughout, pulses intact  - Ambulating independently, had home PT whom have signed off  - Last Hgb check stable  - Cleared to start AC/AP as she was previously taking  - Recommend ACE compression, elevating the extremity and applying a heating pad x 20 mins on/ off  - No follow up in trauma clinic required, please call with questions or concerns regarding hematoma            Does the patient have a positive PTSD Screen? No  Was a psychiatric referral provided? N/A    Chief Complaint:  Freida Palencia is a 79 y.o. female who presents for right leg hematoma. Subjective  Patient reports she continues to have a right lower leg hematoma and it is sore. She is scared that she is going to hit it on something and open it up, causing her to be high risk for infection. At this time she is washing the region daily and keeping a dressing on it to reduce direct contact. She is ambulating independently and not taking any medications for pain. She reports the hematoma has decreased in size. Past Medical History:   Diagnosis Date    Anal fissure     Cardiac disorder     Cognitive changes 12/23/2020    Esophageal reflux     Esophagitis, reflux     Hemorrhoids     Hepatic hemangioma     Last Assessed: 1/13/2015    Herpes zoster     History of colonic polyps     Hypertension     Ischemic colitis ([address])     Lumbar herniated disc     Malignant neoplasm without specification of site ([address])     Nephrolithiasis     L.  Lithotripsy Nontoxic single thyroid nodule     Last Assessed: 1/13/2015    Osteoarthritis     Overactive bladder     Raynaud disease     Respiratory system disease     Sjogren's disease ([address])     Spinal stenosis     PONCHO (stress urinary incontinence, female)     Uterovaginal prolapse     Grade I-II       Past Surgical History:   Procedure Laterality Date    APPENDECTOMY  1947    CARDIAC PACEMAKER PLACEMENT  01/2021    CARDIAC SURGERY      CABG    CATARACT EXTRACTION Bilateral     COLONOSCOPY  2012    Fiberoptic    COLONOSCOPY      Resolved: 2006 - 2012 5 year f/u    CORONARY ANGIOPLASTY WITH STENT PLACEMENT      CORONARY ARTERY BYPASS GRAFT      Resolved: 2012    ESOPHAGOGASTRODUODENOSCOPY  2012    Diagnostic    HEMORROIDECTOMY      KNEE SURGERY      LITHOTRIPSY      Renal    MALIGNANT SKIN LESION EXCISION      Face; Resolved: 2004    UT ESOPHAGOGASTRODUODENOSCOPY TRANSORAL DIAGNOSTIC N/A 4/13/2016    Procedure: EGD AND COLONOSCOPY;  Surgeon: Allen Sanz MD;  Location: [facility];   Service: Gastroenterology    RENAL ARTERY STENT      SKIN LESION EXCISION      Scalp    SOFT TISSUE TUMOR RESECTION      Shoulder; Resolved: 1995    THROMBOLYSIS      Postoperative Thrombolysis PTCA    TONSILLECTOMY      Resolved: 1944       Family History   Problem Relation Age of Onset    Heart disease Mother     Hypertension Mother     Osteoporosis Mother     Heart disease Father     Hypertension Father     No Known Problems Sister     Heart disease Brother     Diabetes Brother     No Known Problems Daughter     No Known Problems Son     No Known Problems Maternal Grandmother     No Known Problems Maternal Grandfather     No Known Problems Paternal Grandmother     No Known Problems Paternal Grandfather     Ulcerative colitis Family     Colon polyps Family     Breast cancer Neg Hx     Colon cancer Neg Hx     Ovarian cancer Neg Hx        Social History     Tobacco Use    Smoking status: Never    Smokeless tobacco: Never   Vaping Use    Vaping Use: Never used   Substance Use Topics    Alcohol use: Yes     Alcohol/week: 1.0 standard drink of alcohol     Types: 1 Glasses of wine per week     Comment: occasionally, but no alcohol intake recently    Drug use: Never        Medications  Current Outpatient Medications on File Prior to Visit   Medication Sig Dispense Refill    acetaminophen (TYLENOL) 325 mg tablet Take 2 tablets (650 mg total) by mouth every 4 (four) hours as needed for mild pain  0    amiodarone 200 mg tablet TAKE 1/2 TABLET (100 MG TOTAL) BY MOUTH DAILY WITH BREAKFAST 15 tablet 8    atorvastatin (LIPITOR) 20 mg tablet TAKE 1 TABLET BY MOUTH EVERY DAY WITH DINNER 90 tablet 4    bumetanide (BUMEX) 2 mg tablet TAKE 1 TABLET BY MOUTH EVERY DAY 30 tablet 8    clotrimazole-betamethasone (LOTRISONE) 1-0.05 % cream APPLY TO AFFECTED AREA TWICE A DAY 30 g 0    hydrOXYzine HCL (ATARAX) 25 mg tablet Take 1 tablet (25 mg total) by mouth every 6 (six) hours as needed for itching 30 tablet 0    levETIRAcetam (KEPPRA) 500 mg tablet Take 1 tablet (500 mg total) by mouth every 12 (twelve) hours 60 tablet 3    metoprolol succinate (TOPROL-XL) 25 mg 24 hr tablet TAKE 1 TABLET BY MOUTH EVERY DAY 30 tablet 8    multivitamin (THERAGRAN) TABS Take 1 tablet by mouth daily. nitroglycerin (NITROSTAT) 0.4 mg SL tablet Place 1 tablet (0.4 mg total) under the tongue every 5 (five) minutes as needed for chest pain 25 tablet 0    sertraline (ZOLOFT) 25 mg tablet TAKE 1 TABLET BY MOUTH EVERY DAY 90 tablet 1    polyethylene glycol (MIRALAX) 17 g packet Take 17 g by mouth daily as needed (Constipation) for up to 5 days 85 g 0    rivaroxaban (Xarelto) 15 mg tablet Take 15 mg by mouth      senna (SENOKOT) 8.6 mg Take 2 tablets (17.2 mg total) by mouth daily for 7 days Do not start before November 14, 2023. 14 tablet 0     No current facility-administered medications on file prior to visit.        Allergies  Allergies   Allergen Reactions    Sulfa Antibiotics Anaphylaxis Formaldehyde     Codeine Palpitations       Review of Systems   Musculoskeletal:  Positive for myalgias. Skin:         Right leg hematoma   Neurological:  Negative for dizziness, weakness and numbness. Objective  Vitals:    11/30/23 1253   Temp: 97.8 °F (36.6 °C)       Physical Exam  Vitals reviewed. Constitutional:       General: She is not in acute distress. Appearance: Normal appearance. HENT:      Head: Normocephalic and atraumatic. Right Ear: External ear normal.      Left Ear: External ear normal.      Nose: Nose normal.      Mouth/Throat:      Mouth: Mucous membranes are moist.      Pharynx: Oropharynx is clear. Eyes:      Extraocular Movements: Extraocular movements intact. Conjunctiva/sclera: Conjunctivae normal.      Pupils: Pupils are equal, round, and reactive to light. Cardiovascular:      Pulses: Normal pulses. Pulmonary:      Effort: Pulmonary effort is normal. No respiratory distress. Musculoskeletal:         General: Swelling, tenderness and signs of injury present. Normal range of motion. Cervical back: Normal range of motion and neck supple. Legs:    Skin:     General: Skin is warm and dry. Capillary Refill: Capillary refill takes less than 2 seconds. Neurological:      General: No focal deficit present. Mental Status: She is alert and oriented to person, place, and time.    Psychiatric:         Behavior: Behavior normal.

## 2023-12-01 ENCOUNTER — TELEPHONE (OUTPATIENT)
Dept: FAMILY MEDICINE CLINIC | Facility: MEDICAL CENTER | Age: 83
End: 2023-12-01

## 2023-12-01 NOTE — TELEPHONE ENCOUNTER
I spoke with the patient last night at 5:15pm and let her know that an antibiotic was sent in, she is aware and understands instructions.

## 2023-12-01 NOTE — TELEPHONE ENCOUNTER
----- Message from Zeyad Villaseñor, [facility] sent at 11/30/2023  5:08 PM EST -----  Please call and inform patient I do not have an official read back on her chest x-ray from the radiologist as of yet. However, looking at the imaging I do not see any obvious consolidations representing pneumonia. However, based on her symptoms and breath sounds on exam I would like to start her on an antibiotic. We will start doxycycline 100 mg twice daily for 7 days, sent to pharmacy. She can also continue with treatment as discussed in office for symptoms.

## 2023-12-04 DIAGNOSIS — I25.10 CORONARY ARTERIOSCLEROSIS: Primary | ICD-10-CM

## 2023-12-04 RX ORDER — PRASUGREL 10 MG/1
TABLET, FILM COATED ORAL
Qty: 90 TABLET | Refills: 3 | Status: ON HOLD | OUTPATIENT
Start: 2023-12-04

## 2023-12-07 ENCOUNTER — HOSPITAL ENCOUNTER (INPATIENT)
Facility: HOSPITAL | Age: 83
LOS: 7 days | Discharge: NURSING, REHAB OR LONG-TERM CARE | DRG: 940 | End: 2023-12-15
Attending: SURGERY | Admitting: SURGERY
Payer: MEDICARE

## 2023-12-07 ENCOUNTER — TELEPHONE (OUTPATIENT)
Age: 83
End: 2023-12-07

## 2023-12-07 DIAGNOSIS — S80.11XD LEG HEMATOMA, RIGHT, SUBSEQUENT ENCOUNTER: Primary | ICD-10-CM

## 2023-12-07 PROCEDURE — 99284 EMERGENCY DEPT VISIT MOD MDM: CPT

## 2023-12-07 PROCEDURE — 99222 1ST HOSP IP/OBS MODERATE 55: CPT | Performed by: SURGERY

## 2023-12-07 RX ORDER — METOPROLOL SUCCINATE 25 MG/1
25 TABLET, EXTENDED RELEASE ORAL DAILY
Status: DISCONTINUED | OUTPATIENT
Start: 2023-12-08 | End: 2023-12-15 | Stop reason: HOSPADM

## 2023-12-07 RX ORDER — LEVETIRACETAM 500 MG/1
500 TABLET ORAL EVERY 12 HOURS
Status: DISCONTINUED | OUTPATIENT
Start: 2023-12-07 | End: 2023-12-15 | Stop reason: HOSPADM

## 2023-12-07 RX ORDER — ACETAMINOPHEN 325 MG/1
650 TABLET ORAL EVERY 4 HOURS PRN
Status: DISCONTINUED | OUTPATIENT
Start: 2023-12-07 | End: 2023-12-15 | Stop reason: HOSPADM

## 2023-12-07 RX ORDER — ATORVASTATIN CALCIUM 20 MG/1
20 TABLET, FILM COATED ORAL
Status: DISCONTINUED | OUTPATIENT
Start: 2023-12-07 | End: 2023-12-15 | Stop reason: HOSPADM

## 2023-12-07 RX ORDER — HYDROXYZINE HYDROCHLORIDE 25 MG/1
25 TABLET, FILM COATED ORAL EVERY 6 HOURS PRN
Status: DISCONTINUED | OUTPATIENT
Start: 2023-12-07 | End: 2023-12-15 | Stop reason: HOSPADM

## 2023-12-07 RX ORDER — BENZONATATE 100 MG/1
100 CAPSULE ORAL 3 TIMES DAILY PRN
Status: DISCONTINUED | OUTPATIENT
Start: 2023-12-07 | End: 2023-12-15 | Stop reason: HOSPADM

## 2023-12-07 RX ORDER — SERTRALINE HYDROCHLORIDE 25 MG/1
25 TABLET, FILM COATED ORAL DAILY
Status: DISCONTINUED | OUTPATIENT
Start: 2023-12-08 | End: 2023-12-15 | Stop reason: HOSPADM

## 2023-12-07 RX ORDER — IPRATROPIUM BROMIDE 21 UG/1
2 SPRAY, METERED NASAL EVERY 12 HOURS
Status: DISCONTINUED | OUTPATIENT
Start: 2023-12-07 | End: 2023-12-07

## 2023-12-07 RX ORDER — BUMETANIDE 2 MG/1
2 TABLET ORAL DAILY
Status: DISCONTINUED | OUTPATIENT
Start: 2023-12-08 | End: 2023-12-08

## 2023-12-07 RX ORDER — AMIODARONE HYDROCHLORIDE 200 MG/1
100 TABLET ORAL
Status: DISCONTINUED | OUTPATIENT
Start: 2023-12-08 | End: 2023-12-15 | Stop reason: HOSPADM

## 2023-12-07 RX ORDER — ONDANSETRON 2 MG/ML
4 INJECTION INTRAMUSCULAR; INTRAVENOUS EVERY 6 HOURS PRN
Status: DISCONTINUED | OUTPATIENT
Start: 2023-12-07 | End: 2023-12-15 | Stop reason: HOSPADM

## 2023-12-07 RX ORDER — FLUTICASONE PROPIONATE 50 MCG
2 SPRAY, SUSPENSION (ML) NASAL DAILY
Status: DISCONTINUED | OUTPATIENT
Start: 2023-12-08 | End: 2023-12-15 | Stop reason: HOSPADM

## 2023-12-07 RX ADMIN — BENZONATATE 100 MG: 100 CAPSULE ORAL at 19:20

## 2023-12-07 RX ADMIN — ATORVASTATIN CALCIUM 20 MG: 20 TABLET, FILM COATED ORAL at 19:20

## 2023-12-07 RX ADMIN — LEVETIRACETAM 500 MG: 500 TABLET, FILM COATED ORAL at 19:20

## 2023-12-07 NOTE — ASSESSMENT & PLAN NOTE
12/7 patient presented to the trauma clinic after her  noted bloody drainage this morning.  No reports of trauma.  Patient has been otherwise asymptomatic and denies any infective type symptoms.  Admit for surgical evaluation and possible debridement.

## 2023-12-07 NOTE — TELEPHONE ENCOUNTER
Patients  is asking for a referral to wound care for his wife's open blister on her right leg. He states he was told if blister opened then he should have her leg looked at. He is asking for a referral to wound care and possibly take her tomorrow.     Please call patients  back as soon as you can

## 2023-12-07 NOTE — TELEPHONE ENCOUNTER
Linwood Salazar called says he hasn't received a call from Dr Aure Brooks and was waiting to see when she will be able to look at the leg. Informed him the referral will be put in, but he say he wanted to make an appt. No appt available. Linwood Salazar says he will elayne the trauma center where she was at to see if they can see her today and will call us back if he needs to.

## 2023-12-07 NOTE — H&P
"[facility]  H&P  Name: Verito Fallon 83 y.o. female I MRN: [id number]  Unit/Bed#: ED 23 I Date of Admission: 12/7/2023   Date of Service: 12/7/2023 I Hospital Day: 0      Assessment/Plan   Leg hematoma, right, subsequent encounter  Assessment & Plan  12/7 patient presented to the trauma clinic after her  noted bloody drainage this morning.  No reports of trauma.  Patient has been otherwise asymptomatic and denies any infective type symptoms.  Admit for surgical evaluation and possible debridement.             Trauma Alert: Other Transfer from trauma clinic     Model of Arrival: Office/Clinic    Trauma Team: Attending Keke and HERBERT Granados  Consultants:     None     History of Present Illness     Chief Complaint: \"It opened up\"  Mechanism:Fall     HPI:    Verito Fallon is a 83 y.o. female with history of atrial fibrillation that she takes Xarelto for, presenting today for reevaluation of a right lower leg hematoma.   states that he was changing the dressing this morning, when he noticed that the hematoma was open and there was a small amount of blood--he was advised to seek medical evaluation if wound open.  Patient is otherwise asymptomatic no new or worsening pain.  No fevers, chills, sweats, wound swelling or erythema--infective type symptoms.  No new trauma.  Patient was seen in trauma clinic and transferred to ED for direct admission for surgical evaluation and possible debridement.  Last PO intake was noon.    Review of Systems   Skin:  Positive for wound.   All other systems reviewed and are negative.    12-point, complete review of systems was reviewed and negative except as stated above.     Historical Information     Past Medical History:   Diagnosis Date    Anal fissure     Cardiac disorder     Cognitive changes 12/23/2020    Esophageal reflux     Esophagitis, reflux     Hemorrhoids     Hepatic hemangioma     Last Assessed: 1/13/2015    Herpes zoster  "    History of colonic polyps     Hypertension     Ischemic colitis (H[facility])     Lumbar herniated disc     Malignant neoplasm without specification of site (H[facility])     Nephrolithiasis     L. Lithotripsy    Nontoxic single thyroid nodule     Last Assessed: 1/13/2015    Osteoarthritis     Overactive bladder     Raynaud disease     Respiratory system disease     Sjogren's disease (HCC)     Spinal stenosis     PONCHO (stress urinary incontinence, female)     Uterovaginal prolapse     Grade I-II     Past Surgical History:   Procedure Laterality Date    APPENDECTOMY  1947    CARDIAC PACEMAKER PLACEMENT  01/2021    CARDIAC SURGERY      CABG    CATARACT EXTRACTION Bilateral     COLONOSCOPY  2012    Fiberoptic    COLONOSCOPY      Resolved: 2006 - 2012 5 year f/u    CORONARY ANGIOPLASTY WITH STENT PLACEMENT      CORONARY ARTERY BYPASS GRAFT      Resolved: 2012    ESOPHAGOGASTRODUODENOSCOPY  2012    Diagnostic    HEMORROIDECTOMY      KNEE SURGERY      LITHOTRIPSY      Renal    MALIGNANT SKIN LESION EXCISION      Face; Resolved: 2004    OR ESOPHAGOGASTRODUODENOSCOPY TRANSORAL DIAGNOSTIC N/A 4/13/2016    Procedure: EGD AND COLONOSCOPY;  Surgeon: Evelin Bone MD;  Location: [facility];  Service: Gastroenterology    RENAL ARTERY STENT      SKIN LESION EXCISION      Scalp    SOFT TISSUE TUMOR RESECTION      Shoulder; Resolved: 1995    THROMBOLYSIS      Postoperative Thrombolysis PTCA    TONSILLECTOMY      Resolved: 1944        Social History     Tobacco Use    Smoking status: Never    Smokeless tobacco: Never   Vaping Use    Vaping Use: Never used   Substance Use Topics    Alcohol use: Yes     Alcohol/week: 1.0 standard drink of alcohol     Types: 1 Glasses of wine per week     Comment: occasionally, but no alcohol intake recently    Drug use: Never     Immunization History   Administered Date(s) Administered    COVID-19 MODERNA VACC 0.5 ML IM 03/30/2021, 04/27/2021, 11/02/2021, 04/06/2022    COVID-19 Moderna Vac BIVALENT 12 Yr+ IM 0.5 ML  02/25/2023    INFLUENZA 10/18/2022    Influenza Split High Dose Preservative Free IM 10/20/2014, 10/26/2016    Influenza, high dose seasonal 0.7 mL 01/04/2019, 01/27/2020, 10/22/2020, 10/08/2021, 10/18/2022, 11/12/2023    Pneumococcal Conjugate 13-Valent 06/21/2017    Pneumococcal Polysaccharide PPV23 12/07/2006    Tdap 10/04/2022    Tuberculin Skin Test-PPD Intradermal 02/15/2021     Last Tetanus: 2022  Family History: Non-contributory    1. Before the illness or injury that brought you to the Emergency, did you need someone to help you on a regular basis? 0=No   2. Since the illness or injury that brought you to the Emergency, have you needed more help than usual to take care of yourself? 1=Yes   3. Have you been hospitalized for one or more nights during the past 6 months (excluding a stay in the Emergency Department)? 0=No   4. In general, do you see well? 1=No   5. In general, do you have serious problems with your memory? 0=No   6. Do you take more than three different medications everyday? 1=Yes   TOTAL   2     Did you order a geriatric consult if the score was 2 or greater?: yes     Meds/Allergies   all current active meds have been reviewed     Allergies   Allergen Reactions    Sulfa Antibiotics Anaphylaxis    Formaldehyde     Codeine Palpitations       Objective   Initial Vitals:   Temperature: 98 °F (36.7 °C) (12/07/23 1609)  Pulse: 76 (12/07/23 1609)  Respirations: 18 (12/07/23 1609)  Blood Pressure: 162/67 (12/07/23 1609)    Primary Survey:   Airway:        Status: patent;        Pre-hospital Interventions: none        Hospital Interventions: none  Breathing:        Pre-hospital Interventions: none       Effort: normal       Right breath sounds: normal       Left breath sounds: normal  Circulation:        Rhythm: regular       Rate: regular   Right Pulses Left Pulses    R radial: 2+  R femoral: 2+  R pedal: 2+     L radial: 2+  L femoral: 2+  L pedal: 2+       Disability:        GCS: Eye: 4; Verbal: 5  Motor: 6 Total: 15       Right Pupil: 3 mm;  round;  reactive         Left Pupil:  3 mm;  round;  reactive      R Motor Strength L Motor Strength    R : 5/5  R dorsiflex: 5/5  R plantarflex: 5/5 L : 5/5  L dorsiflex: 5/5  L plantarflex: 5/5        Sensory:  No sensory deficit  Exposure:       Completed: Yes      Secondary Survey:  Physical Exam  Constitutional:       General: She is not in acute distress.     Appearance: She is not ill-appearing.   HENT:      Head: Normocephalic and atraumatic.      Right Ear: External ear normal.      Left Ear: External ear normal.      Nose: Nose normal.      Mouth/Throat:      Mouth: Mucous membranes are moist.      Pharynx: Oropharynx is clear.   Eyes:      Extraocular Movements: Extraocular movements intact.      Pupils: Pupils are equal, round, and reactive to light.   Cardiovascular:      Rate and Rhythm: Normal rate and regular rhythm.      Pulses: Normal pulses.   Pulmonary:      Effort: Pulmonary effort is normal. No respiratory distress.      Breath sounds: Normal breath sounds. No stridor. No wheezing, rhonchi or rales.   Chest:      Chest wall: No tenderness.   Abdominal:      General: Abdomen is flat. Bowel sounds are normal.      Palpations: Abdomen is soft.   Genitourinary:     Comments: Pelvis stable  Musculoskeletal:      Cervical back: Normal range of motion and neck supple.      Comments: Patient fully ranging all extremities.  No deformities.  She does have a wound on her right lower leg-otherwise no swelling.   Skin:     General: Skin is warm and dry.      Capillary Refill: Capillary refill takes less than 2 seconds.             Comments: Wound on right lower leg-see media.   Neurological:      Mental Status: She is alert and oriented to person, place, and time. Mental status is at baseline.   Psychiatric:         Mood and Affect: Mood normal.         Behavior: Behavior normal.         Thought Content: Thought content normal.         Invasive Devices   "     None                 Lab Results: I have personally reviewed all pertinent laboratory/test results from 12/07/23, including the preceding 24 hours.  No results for input(s): \"WBC\", \"HGB\", \"HCT\", \"PLT\", \"BANDSPCT\", \"SODIUM\", \"K\", \"CL\", \"CO2\", \"BUN\", \"CREATININE\", \"GLUC\", \"CAIONIZED\", \"MG\", \"PHOS\", \"AST\", \"ALT\", \"ALB\", \"TBILI\", \"DBILI\", \"ALKPHOS\", \"PTT\", \"INR\", \"HSTNI0\", \"HSTNI2\", \"BNP\", \"LACTICACID\" in the last 72 hours.    Imaging Results: I have personally reviewed pertinent images saved in PACS. CT scan findings (and other pertinent positive findings on images) were discussed with radiology. My interpretation of the images/reports are as follows:  Chest Xray(s): N/A   FAST exam(s): N/A   CT Scan(s): N/A   Additional Xray(s): N/A     Other Studies: none    Code Status: Prior  Advance Directive and Living Will: Yes    Power of :    POLST:    I have spent 25 minutes with Patient  today in which greater than 50% of this time was spent in counseling/coordination of care regarding Diagnostic results, Prognosis, Risks and benefits of tx options, Instructions for management, Patient and family education, Importance of tx compliance, Risk factor reductions, Impressions, Counseling / Coordination of care, Documenting in the medical record, Reviewing / ordering tests, medicine, procedures  , Obtaining or reviewing history  , and Communicating with other healthcare professionals .     "

## 2023-12-08 ENCOUNTER — ANESTHESIA (OUTPATIENT)
Dept: PERIOP | Facility: HOSPITAL | Age: 83
DRG: 940 | End: 2023-12-08
Payer: MEDICARE

## 2023-12-08 ENCOUNTER — ANESTHESIA EVENT (OUTPATIENT)
Dept: PERIOP | Facility: HOSPITAL | Age: 83
DRG: 940 | End: 2023-12-08
Payer: MEDICARE

## 2023-12-08 LAB
ANION GAP SERPL CALCULATED.3IONS-SCNC: 11 MMOL/L
BUN SERPL-MCNC: 24 MG/DL (ref 5–25)
CALCIUM SERPL-MCNC: 8.9 MG/DL (ref 8.4–10.2)
CHLORIDE SERPL-SCNC: 102 MMOL/L (ref 96–108)
CO2 SERPL-SCNC: 29 MMOL/L (ref 21–32)
CREAT SERPL-MCNC: 1.12 MG/DL (ref 0.6–1.3)
ERYTHROCYTE [DISTWIDTH] IN BLOOD BY AUTOMATED COUNT: 14.2 % (ref 11.6–15.1)
GFR SERPL CREATININE-BSD FRML MDRD: 45 ML/MIN/1.73SQ M
GLUCOSE SERPL-MCNC: 82 MG/DL (ref 65–140)
HCT VFR BLD AUTO: 37 % (ref 34.8–46.1)
HGB BLD-MCNC: 12.1 G/DL (ref 11.5–15.4)
MAGNESIUM SERPL-MCNC: 1.9 MG/DL (ref 1.9–2.7)
MCH RBC QN AUTO: 30 PG (ref 26.8–34.3)
MCHC RBC AUTO-ENTMCNC: 32.7 G/DL (ref 31.4–37.4)
MCV RBC AUTO: 92 FL (ref 82–98)
PLATELET # BLD AUTO: 250 THOUSANDS/UL (ref 149–390)
PMV BLD AUTO: 10.6 FL (ref 8.9–12.7)
POTASSIUM SERPL-SCNC: 3.1 MMOL/L (ref 3.5–5.3)
RBC # BLD AUTO: 4.03 MILLION/UL (ref 3.81–5.12)
SODIUM SERPL-SCNC: 142 MMOL/L (ref 135–147)
WBC # BLD AUTO: 5.92 THOUSAND/UL (ref 4.31–10.16)

## 2023-12-08 PROCEDURE — 97605 NEG PRS WND THER DME<=50SQCM: CPT | Performed by: SURGERY

## 2023-12-08 PROCEDURE — 80048 BASIC METABOLIC PNL TOTAL CA: CPT | Performed by: PHYSICIAN ASSISTANT

## 2023-12-08 PROCEDURE — 83735 ASSAY OF MAGNESIUM: CPT

## 2023-12-08 PROCEDURE — 99232 SBSQ HOSP IP/OBS MODERATE 35: CPT | Performed by: SURGERY

## 2023-12-08 PROCEDURE — 11042 DBRDMT SUBQ TIS 1ST 20SQCM/<: CPT | Performed by: SURGERY

## 2023-12-08 PROCEDURE — 0JDN0ZZ EXTRACTION OF RIGHT LOWER LEG SUBCUTANEOUS TISSUE AND FASCIA, OPEN APPROACH: ICD-10-PCS | Performed by: SURGERY

## 2023-12-08 PROCEDURE — 85027 COMPLETE CBC AUTOMATED: CPT | Performed by: PHYSICIAN ASSISTANT

## 2023-12-08 RX ORDER — ESMOLOL HYDROCHLORIDE 10 MG/ML
INJECTION INTRAVENOUS AS NEEDED
Status: DISCONTINUED | OUTPATIENT
Start: 2023-12-08 | End: 2023-12-08

## 2023-12-08 RX ORDER — CEFAZOLIN SODIUM 1 G/50ML
SOLUTION INTRAVENOUS AS NEEDED
Status: DISCONTINUED | OUTPATIENT
Start: 2023-12-08 | End: 2023-12-08

## 2023-12-08 RX ORDER — POTASSIUM CHLORIDE 20 MEQ/1
40 TABLET, EXTENDED RELEASE ORAL 2 TIMES DAILY
Status: COMPLETED | OUTPATIENT
Start: 2023-12-08 | End: 2023-12-08

## 2023-12-08 RX ORDER — SODIUM CHLORIDE, SODIUM LACTATE, POTASSIUM CHLORIDE, CALCIUM CHLORIDE 600; 310; 30; 20 MG/100ML; MG/100ML; MG/100ML; MG/100ML
75 INJECTION, SOLUTION INTRAVENOUS CONTINUOUS
Status: DISCONTINUED | OUTPATIENT
Start: 2023-12-08 | End: 2023-12-08

## 2023-12-08 RX ORDER — DEXAMETHASONE SODIUM PHOSPHATE 10 MG/ML
INJECTION, SOLUTION INTRAMUSCULAR; INTRAVENOUS AS NEEDED
Status: DISCONTINUED | OUTPATIENT
Start: 2023-12-08 | End: 2023-12-08

## 2023-12-08 RX ORDER — FENTANYL CITRATE 50 UG/ML
INJECTION, SOLUTION INTRAMUSCULAR; INTRAVENOUS AS NEEDED
Status: DISCONTINUED | OUTPATIENT
Start: 2023-12-08 | End: 2023-12-08

## 2023-12-08 RX ORDER — MAGNESIUM HYDROXIDE 1200 MG/15ML
LIQUID ORAL AS NEEDED
Status: DISCONTINUED | OUTPATIENT
Start: 2023-12-08 | End: 2023-12-08 | Stop reason: HOSPADM

## 2023-12-08 RX ORDER — ONDANSETRON 2 MG/ML
4 INJECTION INTRAMUSCULAR; INTRAVENOUS ONCE AS NEEDED
Status: DISCONTINUED | OUTPATIENT
Start: 2023-12-08 | End: 2023-12-08 | Stop reason: HOSPADM

## 2023-12-08 RX ORDER — HYDROMORPHONE HCL/PF 1 MG/ML
0.4 SYRINGE (ML) INJECTION
Status: DISCONTINUED | OUTPATIENT
Start: 2023-12-08 | End: 2023-12-08 | Stop reason: HOSPADM

## 2023-12-08 RX ORDER — SODIUM CHLORIDE, SODIUM LACTATE, POTASSIUM CHLORIDE, CALCIUM CHLORIDE 600; 310; 30; 20 MG/100ML; MG/100ML; MG/100ML; MG/100ML
INJECTION, SOLUTION INTRAVENOUS CONTINUOUS PRN
Status: DISCONTINUED | OUTPATIENT
Start: 2023-12-08 | End: 2023-12-08

## 2023-12-08 RX ORDER — LIDOCAINE HYDROCHLORIDE 10 MG/ML
INJECTION, SOLUTION EPIDURAL; INFILTRATION; INTRACAUDAL; PERINEURAL AS NEEDED
Status: DISCONTINUED | OUTPATIENT
Start: 2023-12-08 | End: 2023-12-08

## 2023-12-08 RX ORDER — PROPOFOL 10 MG/ML
INJECTION, EMULSION INTRAVENOUS AS NEEDED
Status: DISCONTINUED | OUTPATIENT
Start: 2023-12-08 | End: 2023-12-08

## 2023-12-08 RX ORDER — PHENYLEPHRINE HCL IN 0.9% NACL 1 MG/10 ML
SYRINGE (ML) INTRAVENOUS AS NEEDED
Status: DISCONTINUED | OUTPATIENT
Start: 2023-12-08 | End: 2023-12-08

## 2023-12-08 RX ORDER — OXYCODONE HYDROCHLORIDE 5 MG/1
5 TABLET ORAL EVERY 4 HOURS PRN
Status: DISCONTINUED | OUTPATIENT
Start: 2023-12-08 | End: 2023-12-15 | Stop reason: HOSPADM

## 2023-12-08 RX ADMIN — SERTRALINE 25 MG: 25 TABLET, FILM COATED ORAL at 08:54

## 2023-12-08 RX ADMIN — LEVETIRACETAM 500 MG: 500 TABLET, FILM COATED ORAL at 20:50

## 2023-12-08 RX ADMIN — LIDOCAINE HYDROCHLORIDE 50 MG: 10 INJECTION, SOLUTION EPIDURAL; INFILTRATION; INTRACAUDAL; PERINEURAL at 14:11

## 2023-12-08 RX ADMIN — FENTANYL CITRATE 25 MCG: 50 INJECTION INTRAMUSCULAR; INTRAVENOUS at 14:25

## 2023-12-08 RX ADMIN — METOPROLOL SUCCINATE 25 MG: 25 TABLET, EXTENDED RELEASE ORAL at 08:54

## 2023-12-08 RX ADMIN — FENTANYL CITRATE 25 MCG: 50 INJECTION INTRAMUSCULAR; INTRAVENOUS at 14:57

## 2023-12-08 RX ADMIN — ESMOLOL HYDROCHLORIDE 20 MG: 100 INJECTION, SOLUTION INTRAVENOUS at 14:52

## 2023-12-08 RX ADMIN — SODIUM CHLORIDE, SODIUM LACTATE, POTASSIUM CHLORIDE, AND CALCIUM CHLORIDE: .6; .31; .03; .02 INJECTION, SOLUTION INTRAVENOUS at 14:04

## 2023-12-08 RX ADMIN — ONDANSETRON 4 MG: 2 INJECTION INTRAMUSCULAR; INTRAVENOUS at 14:11

## 2023-12-08 RX ADMIN — FENTANYL CITRATE 50 MCG: 50 INJECTION INTRAMUSCULAR; INTRAVENOUS at 15:01

## 2023-12-08 RX ADMIN — PHENYLEPHRINE HYDROCHLORIDE 30 MCG/MIN: 10 INJECTION INTRAVENOUS at 14:21

## 2023-12-08 RX ADMIN — POTASSIUM CHLORIDE 40 MEQ: 1500 TABLET, EXTENDED RELEASE ORAL at 17:35

## 2023-12-08 RX ADMIN — ESMOLOL HYDROCHLORIDE 20 MG: 100 INJECTION, SOLUTION INTRAVENOUS at 14:54

## 2023-12-08 RX ADMIN — POTASSIUM CHLORIDE 40 MEQ: 1500 TABLET, EXTENDED RELEASE ORAL at 11:16

## 2023-12-08 RX ADMIN — ATORVASTATIN CALCIUM 20 MG: 20 TABLET, FILM COATED ORAL at 17:36

## 2023-12-08 RX ADMIN — AMIODARONE HYDROCHLORIDE 100 MG: 200 TABLET ORAL at 08:54

## 2023-12-08 RX ADMIN — PROPOFOL 120 MG: 10 INJECTION, EMULSION INTRAVENOUS at 14:11

## 2023-12-08 RX ADMIN — DEXAMETHASONE SODIUM PHOSPHATE 5 MG: 10 INJECTION, SOLUTION INTRAMUSCULAR; INTRAVENOUS at 14:20

## 2023-12-08 RX ADMIN — Medication 100 MCG: at 14:22

## 2023-12-08 RX ADMIN — LEVETIRACETAM 500 MG: 500 TABLET, FILM COATED ORAL at 08:55

## 2023-12-08 RX ADMIN — ACETAMINOPHEN 650 MG: 325 TABLET, FILM COATED ORAL at 17:35

## 2023-12-08 RX ADMIN — CEFAZOLIN SODIUM 1000 MG: 1 SOLUTION INTRAVENOUS at 14:16

## 2023-12-08 NOTE — PROGRESS NOTES
Progress Note  Verito Fallon 83 y.o. female MRN: [id number]  Unit/Bed#: [facility] 629-01 Encounter: [id number]    Assessment  83F with Afib on xarelto, with known RLE hematoma that opened up 12/7.    Plan  - NPO for OR today for RLE washout, psb VAC, psb matrix    Subjective/Objective   No acute overnight events. Pain controlled. Voiding, walking.    VSS on RA.  /56   Pulse 71   Temp 97.8 °F (36.6 °C)   Resp 17   Ht 5' (1.524 m)   Wt 59 kg (130 lb)   SpO2 93%   BMI 25.39 kg/m²     Physical Exam:  General: NAD  HENT: NCAT  CV: nl rate  Lungs: nl wob. No resp distress.  ABD: Soft, ND, NT  Extrem: RLE wound/hematoma, no active bleeding, leg is tender below wound  Neuro: alert & oriented    UOP: x1    Recent Labs     12/08/23  0421   WBC 5.92   HGB 12.1      SODIUM 142   K 3.1*      CO2 29   BUN 24   CREATININE 1.12   GLUC 82   CALCIUM 8.9   MG 1.9

## 2023-12-08 NOTE — ANESTHESIA POSTPROCEDURE EVALUATION
Post-Op Assessment Note    CV Status:  Stable    Pain management: adequate       Mental Status:  Alert and awake   Hydration Status:  Euvolemic   PONV Controlled:  Controlled   Airway Patency:  Patent     Post Op Vitals Reviewed: Yes      Staff: CRNA               /72 (12/08/23 1500)    Temp 97.7 °F (36.5 °C) (12/08/23 1500)    Pulse 79 (12/08/23 1500)   Resp   16   SpO2   100

## 2023-12-08 NOTE — CASE MANAGEMENT
Case Management Assessment & Discharge Planning Note    Patient name Verito Fallon  Location [facility] 629/[facility] 629-01 MRN [id number]  : 1940 Date 2023       Current Admission Date: 2023  Current Admission Diagnosis:Leg hematoma, right, subsequent encounter  Patient Active Problem List    Diagnosis Date Noted    Leg hematoma, right, subsequent encounter 2023    Closed nondisplaced fracture of fifth metacarpal bone of right hand, unspecified portion of metacarpal, initial encounter 2023    Closed fracture of fifth metacarpal bone of right hand 2023    Multiple contusions 2023    Pruritic rash 2023    Wound of right leg 2022    Fall 10/04/2022    Cervical spondylosis     Cervical disc disorder with radiculopathy of mid-cervical region     Acute (reversible) ischemia of small intestine, extent unspecified ([facility]) 2022    Atherosclerosis with claudication of extremity ([facility]) 2021    Carotid stenosis, asymptomatic, bilateral 2021    Mild cognitive impairment 2021    Memory loss 2021    Current use of long term anticoagulation 2021    Long term current use of amiodarone 2021    Renal artery stenosis ([facility]) 2021    Pulmonary hypertension ([facility]) 2021    Sick sinus syndrome ([facility]) 2021    Hx of CABG 2021    CAD (coronary artery disease) 2021    Depression 2021    Seizure-like activity ([facility]) 2021    Hyperlipidemia 2021    Toxic metabolic encephalopathy 2021    Edema of left upper extremity 2021    Urinary retention 2021    A-fib ([facility]) 2021    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  [address] [facility] [place] [address]  Phone: [phone] Fax: [phone]    Primary Care Provider: Robbie Warner DO    Primary Insurance: MEDICARE  Secondary Insurance: AETNA    ASSESSMENT:  Active Health Care Proxies       Leander Fallon Health Care Representative - Spouse   Primary Phone: [phone] (Mobile)  Home Phone: [phone]                 Patient Information  Admitted from:: Home  Mental Status: Alert  During Assessment patient was accompanied by: Spouse  Assessment information provided by:: Spouse, Patient  Primary Caregiver: Self  Support Systems: Self, Spouse/significant other, Daughter  County of Residence: [place]  What city do you live in?: [place]  Home entry access options. Select all that apply.: Stairs  Number of steps to enter home.: 2  Type of Current Residence: [facility]  Living Arrangements: Lives w/ Friend, Lives Alone  Is patient a ?: No    Activities of Daily Living Prior to Admission  Functional Status: Independent  Completes ADLs independently?: Yes  Ambulates independently?: Yes  Does patient use assisted devices?: No  Does patient currently own DME?: Yes  What DME does the patient currently own?: Walker  Does patient have a history of Outpatient Therapy (PT/OT)?: Yes  Does the patient have a history of Short-Term Rehab?: Yes ([facility])  Does patient have a history of HHC?: Yes (unsure of agency)    Patient Information Continued  Income Source: Pension/nursing home  Does patient have prescription coverage?: Yes  Does patient receive dialysis treatments?: Yes  Does patient have a history of substance abuse?: No  Does patient have a history of Mental Health Diagnosis?: No    Means of Transportation  Means of Transport to Appts:: Family transport      Housing Stability: Low Risk  (12/8/2023)    Housing Stability Vital Sign      Unable to Pay for Housing in the Last Year: No     Number of Places Lived in the Last Year: 1     Unstable Housing in the Last Year: No   Food Insecurity: No Food Insecurity (12/8/2023)    Hunger Vital Sign     Worried About Running Out of Food in the Last Year: Never true     Ran Out of Food in the Last Year: Never true   Transportation Needs: No Transportation Needs (12/8/2023)    PRAPARE - Transportation     Lack of Transportation (Medical): No     Lack of Transportation (Non-Medical): No   Utilities: Not At Risk (12/8/2023)    [facility] Utilities     Threatened with loss of utilities: No       DISCHARGE DETAILS:    Discharge planning discussed with:: Patient  Freedom of Choice: Yes  Comments - Freedom of Choice: Discussed FOC  CM contacted family/caregiver?: Yes (spouse at bedside)    CM reviewed d/c planning process including the following: identifying help at home, patient preference for d/c planning needs, Discharge Lounge, [facility] Meds to Bed program, availability of treatment team to discuss questions or concerns patient and/or family may have regarding understanding medications and recognizing signs and symptoms once discharged.  CM also encouraged patient to follow up with all recommended appointments after discharge. Patient advised of importance for patient and family to participate in managing patient’s medical well being.    Information obtained from patient and spouse at bedside . Lives with spouse in ranch style home [address].  IADLS, no falls in past 3 months, Has walker and cane at home (not using at this point).  Retired , enjoys golfing, walks, and tennis.   transports to appointments

## 2023-12-08 NOTE — ANESTHESIA PREPROCEDURE EVALUATION
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      Anesthesia Plan  ASA Score- 3     Anesthesia Type- general with ASA Monitors. Additional Monitors:     Airway Plan: LMA. Plan Factors-    Chart reviewed. Induction- intravenous. Postoperative Plan-     Informed Consent- Anesthetic plan and risks discussed with patient. I personally reviewed this patient with the CRNA. Discussed and agreed on the Anesthesia Plan with the CRNA. Uriel Castle

## 2023-12-08 NOTE — QUICK NOTE
Post-Op Check    Assessment:  83F with RLE hematoma s/p 12/8 RLE debridement/washout, VAC application.    Subjective:  Pt's pain is controlled. Denies nausea, chest pain, or SOB. Voiding. Has occasional cough. Gave pt incentive spirometer, pulling 500cc.    Objective:  VSS on RA.  /69   Pulse 70   Temp (!) 96.1 °F (35.6 °C)   Resp 22   Ht 5' (1.524 m)   Wt 59 kg (130 lb)   SpO2 90%   BMI 25.39 kg/m²     General: NAD  HENT: NCAT  CV: nl rate  Lungs: nl wob. No resp distress.  ABD: Soft, ND, NT  Extrem: RLE wound vac in place to suction, no leak, with minimal ss drainage. Leg inferior to wound tender to palpation. +2 DP pulse.  Neuro: alert & oriented

## 2023-12-08 NOTE — OP NOTE
OPERATIVE REPORT  PATIENT NAME: Verito Fallon    :  1940  MRN: [id number]  Pt Location: BE OR ROOM 03    SURGERY DATE: 2023    Surgeon(s) and Role:     * Abhijeet Davies MD - Primary     * Ashish Willams MD    Preop Diagnosis:  Leg hematoma, right, subsequent encounter S80.11XD    Post-Op Diagnosis Codes:     * Leg hematoma, right, subsequent encounter [S80.11XD]    Procedure(s):  Right - DEBRIDEMENT LOWER EXTREMITY (WASH OUT); POSSIBLE VAC APPLICATION    Specimen(s):  * No specimens in log *    Estimated Blood Loss:   Minimal    Drains:  * No LDAs found *    Anesthesia Type:   General    Operative Indications:  Leg hematoma, right, subsequent encounter S80.11XD      Operative Findings:  Shallow hematoma/eschar unroofed and debrided down to level of the subcutaneous tissue; final wound dimensions measured 4 x 5 cm    Complications:   None    Procedure and Technique:  Patient was identified in the preoperative holding area both verbally by name and by armband.  Surgical site was marked.  She was brought to the operating room, placed supine on the operating room table.  General anesthesia was induced, and an LMA was placed.  The right lower extremity was prepped and draped in the usual sterile fashion using Betadine.  A time-out was called, and all were in agreement to begin the procedure.  The overlying eschar was bluntly debrided and underlying hematoma was evacuated using the Pulsavac down to the level of the subcutaneous fat; total of 1 L of irrigation was used.  Hemostasis was achieved with electrocautery.  A wound VAC black sponge was cut to the size of the wound, measuring 4 x 5 cm.  The drape and lollipop were applied and was connected to continuous suction.  At the end of the case all instrument and sponge counts were accurate and are forming was negative x 2.  General esthesia was gently reversed and the LMA was removed.  The patient was transferred to PACU in stable condition    Dr Davies  was present for the entire procedure.    Patient Disposition:  PACU         SIGNATURE: Ashish Willams MD  DATE: December 8, 2023  TIME: 3:05 PM

## 2023-12-08 NOTE — PLAN OF CARE
Problem: PAIN - ADULT  Goal: Verbalizes/displays adequate comfort level or baseline comfort level  Description: Interventions:  - Encourage patient to monitor pain and request assistance  - Assess pain using appropriate pain scale  - Administer analgesics based on type and severity of pain and evaluate response  - Implement non-pharmacological measures as appropriate and evaluate response  - Consider cultural and social influences on pain and pain management  - Notify physician/advanced practitioner if interventions unsuccessful or patient reports new pain  Outcome: Progressing     Problem: INFECTION - ADULT  Goal: Absence or prevention of progression during hospitalization  Description: INTERVENTIONS:  - Assess and monitor for signs and symptoms of infection  - Monitor lab/diagnostic results  - Monitor all insertion sites, i.e. indwelling lines, tubes, and drains  - Monitor endotracheal if appropriate and nasal secretions for changes in amount and color  - [place] appropriate cooling/warming therapies per order  - Administer medications as ordered  - Instruct and encourage patient and family to use good hand hygiene technique  - Identify and instruct in appropriate isolation precautions for identified infection/condition  Outcome: Progressing  Goal: Absence of fever/infection during neutropenic period  Description: INTERVENTIONS:  - Monitor WBC    Outcome: Progressing     Problem: SAFETY ADULT  Goal: Patient will remain free of falls  Description: INTERVENTIONS:  - Educate patient/family on patient safety including physical limitations  - Instruct patient to call for assistance with activity   - Consult OT/PT to assist with strengthening/mobility   - Keep Call bell within reach  - Keep bed low and locked with side rails adjusted as appropriate  - Keep care items and personal belongings within reach  - Initiate and maintain comfort rounds  - Make Fall Risk Sign visible to staff  - Offer Toileting every 2 Hours,  in advance of need  - Initiate/Maintain on alarm  - Obtain necessary fall risk management equipment:   - Apply yellow socks and bracelet for high fall risk patients  - Consider moving patient to room near nurses station  Outcome: Progressing  Goal: Maintain or return to baseline ADL function  Description: INTERVENTIONS:  -  Assess patient's ability to carry out ADLs; assess patient's baseline for ADL function and identify physical deficits which impact ability to perform ADLs (bathing, care of mouth/teeth, toileting, grooming, dressing, etc.)  - Assess/evaluate cause of self-care deficits   - Assess range of motion  - Assess patient's mobility; develop plan if impaired  - Assess patient's need for assistive devices and provide as appropriate  - Encourage maximum independence but intervene and supervise when necessary  - Involve family in performance of ADLs  - Assess for home care needs following discharge   - Consider OT consult to assist with ADL evaluation and planning for discharge  - Provide patient education as appropriate  Outcome: Progressing  Goal: Maintains/Returns to pre admission functional level  Description: INTERVENTIONS:  - Perform AM-PAC 6 Click Basic Mobility/ Daily Activity assessment daily.  - Set and communicate daily mobility goal to care team and patient/family/caregiver.   - Collaborate with rehabilitation services on mobility goals if consulted  - Perform Range of Motion 3 times a day.  - Reposition patient every 2 hours.  - Dangle patient 3 times a day  - Stand patient 3 times a day  - Ambulate patient 3 times a day  - Out of bed to chair 3 times a day   - Out of bed for meals 3 times a day  - Out of bed for toileting  - Record patient progress and toleration of activity level   Outcome: Progressing

## 2023-12-09 LAB
ANION GAP SERPL CALCULATED.3IONS-SCNC: 8 MMOL/L
BASOPHILS # BLD AUTO: 0.01 THOUSANDS/ÂΜL (ref 0–0.1)
BASOPHILS NFR BLD AUTO: 0 % (ref 0–1)
BUN SERPL-MCNC: 25 MG/DL (ref 5–25)
CALCIUM SERPL-MCNC: 9.3 MG/DL (ref 8.4–10.2)
CHLORIDE SERPL-SCNC: 106 MMOL/L (ref 96–108)
CO2 SERPL-SCNC: 26 MMOL/L (ref 21–32)
CREAT SERPL-MCNC: 0.88 MG/DL (ref 0.6–1.3)
EOSINOPHIL # BLD AUTO: 0 THOUSAND/ÂΜL (ref 0–0.61)
EOSINOPHIL NFR BLD AUTO: 0 % (ref 0–6)
ERYTHROCYTE [DISTWIDTH] IN BLOOD BY AUTOMATED COUNT: 14.2 % (ref 11.6–15.1)
GFR SERPL CREATININE-BSD FRML MDRD: 60 ML/MIN/1.73SQ M
GLUCOSE SERPL-MCNC: 120 MG/DL (ref 65–140)
HCT VFR BLD AUTO: 37.8 % (ref 34.8–46.1)
HGB BLD-MCNC: 12.2 G/DL (ref 11.5–15.4)
IMM GRANULOCYTES # BLD AUTO: 0.05 THOUSAND/UL (ref 0–0.2)
IMM GRANULOCYTES NFR BLD AUTO: 1 % (ref 0–2)
LYMPHOCYTES # BLD AUTO: 0.63 THOUSANDS/ÂΜL (ref 0.6–4.47)
LYMPHOCYTES NFR BLD AUTO: 8 % (ref 14–44)
MAGNESIUM SERPL-MCNC: 2 MG/DL (ref 1.9–2.7)
MCH RBC QN AUTO: 29.8 PG (ref 26.8–34.3)
MCHC RBC AUTO-ENTMCNC: 32.3 G/DL (ref 31.4–37.4)
MCV RBC AUTO: 92 FL (ref 82–98)
MONOCYTES # BLD AUTO: 0.26 THOUSAND/ÂΜL (ref 0.17–1.22)
MONOCYTES NFR BLD AUTO: 3 % (ref 4–12)
NEUTROPHILS # BLD AUTO: 6.6 THOUSANDS/ÂΜL (ref 1.85–7.62)
NEUTS SEG NFR BLD AUTO: 88 % (ref 43–75)
NRBC BLD AUTO-RTO: 0 /100 WBCS
PHOSPHATE SERPL-MCNC: 3.8 MG/DL (ref 2.3–4.1)
PLATELET # BLD AUTO: 249 THOUSANDS/UL (ref 149–390)
PMV BLD AUTO: 11 FL (ref 8.9–12.7)
POTASSIUM SERPL-SCNC: 4.4 MMOL/L (ref 3.5–5.3)
RBC # BLD AUTO: 4.09 MILLION/UL (ref 3.81–5.12)
SODIUM SERPL-SCNC: 140 MMOL/L (ref 135–147)
WBC # BLD AUTO: 7.55 THOUSAND/UL (ref 4.31–10.16)

## 2023-12-09 PROCEDURE — 80048 BASIC METABOLIC PNL TOTAL CA: CPT

## 2023-12-09 PROCEDURE — 84100 ASSAY OF PHOSPHORUS: CPT

## 2023-12-09 PROCEDURE — 83735 ASSAY OF MAGNESIUM: CPT

## 2023-12-09 PROCEDURE — 99232 SBSQ HOSP IP/OBS MODERATE 35: CPT | Performed by: SURGERY

## 2023-12-09 PROCEDURE — 85025 COMPLETE CBC W/AUTO DIFF WBC: CPT

## 2023-12-09 RX ADMIN — AMIODARONE HYDROCHLORIDE 100 MG: 200 TABLET ORAL at 10:15

## 2023-12-09 RX ADMIN — LEVETIRACETAM 500 MG: 500 TABLET, FILM COATED ORAL at 10:15

## 2023-12-09 RX ADMIN — LEVETIRACETAM 500 MG: 500 TABLET, FILM COATED ORAL at 18:34

## 2023-12-09 RX ADMIN — FLUTICASONE PROPIONATE 2 SPRAY: 50 SPRAY, METERED NASAL at 10:14

## 2023-12-09 RX ADMIN — ATORVASTATIN CALCIUM 20 MG: 20 TABLET, FILM COATED ORAL at 17:22

## 2023-12-09 RX ADMIN — OXYCODONE HYDROCHLORIDE 5 MG: 5 TABLET ORAL at 15:59

## 2023-12-09 RX ADMIN — METOPROLOL SUCCINATE 25 MG: 25 TABLET, EXTENDED RELEASE ORAL at 10:15

## 2023-12-09 RX ADMIN — SERTRALINE 25 MG: 25 TABLET, FILM COATED ORAL at 10:15

## 2023-12-09 NOTE — PROGRESS NOTES
Progress Note  Verito Fallon 83 y.o. female MRN: [id number]  Unit/Bed#: [facility] 629-01 Encounter: [id number]    Assessment  83F with RLE hematoma s/p 12/8 RLE debridement/washout, VAC application.     Plan  - regular diet  - vac change on 12/11  - continue to hold home rivaroxaban, prasugrel    Subjective/Objective   No acute overnight events. Pain controlled. Tolerating diet. Voiding, got OOB to chair yesterday.    VSS on RA.  /62   Pulse 73   Temp 98.1 °F (36.7 °C)   Resp 18   Ht 5' (1.524 m)   Wt 59 kg (130 lb)   SpO2 93%   BMI 25.39 kg/m²     Physical Exam:  General: NAD  HENT: NCAT  CV: nl rate  Lungs: nl wob. No resp distress.  ABD: Soft, ND, NT  Extrem: RLE wound vac in place to -125 suction, no leak, with scant ss drainage. Leg inferior to wound tender to palpation. 2+ DP pulse.  Neuro: alert & oriented    UOP: x2  Stool: x1    12/09/23 labs pending  Recent Labs     12/08/23  0421 12/09/23  0612   WBC 5.92 7.55   HGB 12.1 12.2    249   SODIUM 142  --    K 3.1*  --      --    CO2 29  --    BUN 24  --    CREATININE 1.12  --    GLUC 82  --    CALCIUM 8.9  --    MG 1.9  --

## 2023-12-09 NOTE — PLAN OF CARE
Problem: PAIN - ADULT  Goal: Verbalizes/displays adequate comfort level or baseline comfort level  Description: Interventions:  - Encourage patient to monitor pain and request assistance  - Assess pain using appropriate pain scale  - Administer analgesics based on type and severity of pain and evaluate response  - Implement non-pharmacological measures as appropriate and evaluate response  - Consider cultural and social influences on pain and pain management  - Notify physician/advanced practitioner if interventions unsuccessful or patient reports new pain  Outcome: Progressing     Problem: INFECTION - ADULT  Goal: Absence or prevention of progression during hospitalization  Description: INTERVENTIONS:  - Assess and monitor for signs and symptoms of infection  - Monitor lab/diagnostic results  - Monitor all insertion sites, i.e. indwelling lines, tubes, and drains  - Monitor endotracheal if appropriate and nasal secretions for changes in amount and color  - [place] appropriate cooling/warming therapies per order  - Administer medications as ordered  - Instruct and encourage patient and family to use good hand hygiene technique  - Identify and instruct in appropriate isolation precautions for identified infection/condition  Outcome: Progressing  Goal: Absence of fever/infection during neutropenic period  Description: INTERVENTIONS:  - Monitor WBC    Outcome: Progressing     Problem: SAFETY ADULT  Goal: Patient will remain free of falls  Description: INTERVENTIONS:  - Educate patient/family on patient safety including physical limitations  - Instruct patient to call for assistance with activity   - Consult OT/PT to assist with strengthening/mobility   - Keep Call bell within reach  - Keep bed low and locked with side rails adjusted as appropriate  - Keep care items and personal belongings within reach  - Initiate and maintain comfort rounds  - Make Fall Risk Sign visible to staff  - Offer Toileting every 2 Hours,  in advance of need  - Initiate/Maintain on alarm  - Obtain necessary fall risk management equipment:   - Apply yellow socks and bracelet for high fall risk patients  - Consider moving patient to room near nurses station  Outcome: Progressing  Goal: Maintain or return to baseline ADL function  Description: INTERVENTIONS:  -  Assess patient's ability to carry out ADLs; assess patient's baseline for ADL function and identify physical deficits which impact ability to perform ADLs (bathing, care of mouth/teeth, toileting, grooming, dressing, etc.)  - Assess/evaluate cause of self-care deficits   - Assess range of motion  - Assess patient's mobility; develop plan if impaired  - Assess patient's need for assistive devices and provide as appropriate  - Encourage maximum independence but intervene and supervise when necessary  - Involve family in performance of ADLs  - Assess for home care needs following discharge   - Consider OT consult to assist with ADL evaluation and planning for discharge  - Provide patient education as appropriate  Outcome: Progressing  Goal: Maintains/Returns to pre admission functional level  Description: INTERVENTIONS:  - Perform AM-PAC 6 Click Basic Mobility/ Daily Activity assessment daily.  - Set and communicate daily mobility goal to care team and patient/family/caregiver.   - Collaborate with rehabilitation services on mobility goals if consulted  - Perform Range of Motion 3 times a day.  - Reposition patient every 2 hours.  - Dangle patient 3 times a day  - Stand patient 3 times a day  - Ambulate patient 3 times a day  - Out of bed to chair 3 times a day   - Out of bed for meals 3 times a day  - Out of bed for toileting  - Record patient progress and toleration of activity level   Outcome: Progressing     Problem: DISCHARGE PLANNING  Goal: Discharge to home or other facility with appropriate resources  Description: INTERVENTIONS:  - Identify barriers to discharge w/patient and caregiver  -  Arrange for needed discharge resources and transportation as appropriate  - Identify discharge learning needs (meds, wound care, etc.)  - Arrange for interpretive services to assist at discharge as needed  - Refer to Case Management Department for coordinating discharge planning if the patient needs post-hospital services based on physician/advanced practitioner order or complex needs related to functional status, cognitive ability, or social support system  Outcome: Progressing     Problem: Knowledge Deficit  Goal: Patient/family/caregiver demonstrates understanding of disease process, treatment plan, medications, and discharge instructions  Description: Complete learning assessment and assess knowledge base.  Interventions:  - Provide teaching at level of understanding  - Provide teaching via preferred learning methods  Outcome: Progressing

## 2023-12-09 NOTE — PLAN OF CARE
Problem: PAIN - ADULT  Goal: Verbalizes/displays adequate comfort level or baseline comfort level  Description: Interventions:  - Encourage patient to monitor pain and request assistance  - Assess pain using appropriate pain scale  - Administer analgesics based on type and severity of pain and evaluate response  - Implement non-pharmacological measures as appropriate and evaluate response  - Consider cultural and social influences on pain and pain management  - Notify physician/advanced practitioner if interventions unsuccessful or patient reports new pain  Outcome: Progressing     Problem: INFECTION - ADULT  Goal: Absence or prevention of progression during hospitalization  Description: INTERVENTIONS:  - Assess and monitor for signs and symptoms of infection  - Monitor lab/diagnostic results  - Monitor all insertion sites, i.e. indwelling lines, tubes, and drains  - Monitor endotracheal if appropriate and nasal secretions for changes in amount and color  - [place] appropriate cooling/warming therapies per order  - Administer medications as ordered  - Instruct and encourage patient and family to use good hand hygiene technique  - Identify and instruct in appropriate isolation precautions for identified infection/condition  Outcome: Progressing  Goal: Absence of fever/infection during neutropenic period  Description: INTERVENTIONS:  - Monitor WBC    Outcome: Progressing

## 2023-12-10 PROCEDURE — 99232 SBSQ HOSP IP/OBS MODERATE 35: CPT | Performed by: STUDENT IN AN ORGANIZED HEALTH CARE EDUCATION/TRAINING PROGRAM

## 2023-12-10 RX ORDER — DOCUSATE SODIUM 100 MG/1
100 CAPSULE, LIQUID FILLED ORAL 2 TIMES DAILY
Status: DISCONTINUED | OUTPATIENT
Start: 2023-12-10 | End: 2023-12-15 | Stop reason: HOSPADM

## 2023-12-10 RX ORDER — ENOXAPARIN SODIUM 100 MG/ML
30 INJECTION SUBCUTANEOUS EVERY 12 HOURS SCHEDULED
Status: DISCONTINUED | OUTPATIENT
Start: 2023-12-10 | End: 2023-12-15 | Stop reason: HOSPADM

## 2023-12-10 RX ORDER — SENNOSIDES 8.6 MG
1 TABLET ORAL
Status: DISCONTINUED | OUTPATIENT
Start: 2023-12-10 | End: 2023-12-15 | Stop reason: HOSPADM

## 2023-12-10 RX ADMIN — ENOXAPARIN SODIUM 30 MG: 30 INJECTION SUBCUTANEOUS at 10:00

## 2023-12-10 RX ADMIN — SERTRALINE 25 MG: 25 TABLET, FILM COATED ORAL at 08:03

## 2023-12-10 RX ADMIN — LEVETIRACETAM 500 MG: 500 TABLET, FILM COATED ORAL at 19:56

## 2023-12-10 RX ADMIN — OXYCODONE HYDROCHLORIDE 5 MG: 5 TABLET ORAL at 15:24

## 2023-12-10 RX ADMIN — ATORVASTATIN CALCIUM 20 MG: 20 TABLET, FILM COATED ORAL at 17:16

## 2023-12-10 RX ADMIN — DOCUSATE SODIUM 100 MG: 100 CAPSULE, LIQUID FILLED ORAL at 17:16

## 2023-12-10 RX ADMIN — DOCUSATE SODIUM 100 MG: 100 CAPSULE, LIQUID FILLED ORAL at 10:00

## 2023-12-10 RX ADMIN — ENOXAPARIN SODIUM 30 MG: 30 INJECTION SUBCUTANEOUS at 21:30

## 2023-12-10 RX ADMIN — SENNOSIDES 8.6 MG: 8.6 TABLET, FILM COATED ORAL at 21:30

## 2023-12-10 RX ADMIN — LEVETIRACETAM 500 MG: 500 TABLET, FILM COATED ORAL at 08:03

## 2023-12-10 RX ADMIN — METOPROLOL SUCCINATE 25 MG: 25 TABLET, EXTENDED RELEASE ORAL at 08:03

## 2023-12-10 RX ADMIN — AMIODARONE HYDROCHLORIDE 100 MG: 200 TABLET ORAL at 08:03

## 2023-12-10 NOTE — PLAN OF CARE
Problem: PAIN - ADULT  Goal: Verbalizes/displays adequate comfort level or baseline comfort level  Description: Interventions:  - Encourage patient to monitor pain and request assistance  - Assess pain using appropriate pain scale  - Administer analgesics based on type and severity of pain and evaluate response  - Implement non-pharmacological measures as appropriate and evaluate response  - Consider cultural and social influences on pain and pain management  - Notify physician/advanced practitioner if interventions unsuccessful or patient reports new pain  Outcome: Progressing     Problem: INFECTION - ADULT  Goal: Absence or prevention of progression during hospitalization  Description: INTERVENTIONS:  - Assess and monitor for signs and symptoms of infection  - Monitor lab/diagnostic results  - Monitor all insertion sites, i.e. indwelling lines, tubes, and drains  - Monitor endotracheal if appropriate and nasal secretions for changes in amount and color  - [place] appropriate cooling/warming therapies per order  - Administer medications as ordered  - Instruct and encourage patient and family to use good hand hygiene technique  - Identify and instruct in appropriate isolation precautions for identified infection/condition  Outcome: Progressing  Goal: Absence of fever/infection during neutropenic period  Description: INTERVENTIONS:  - Monitor WBC    Outcome: Progressing

## 2023-12-10 NOTE — PROGRESS NOTES
Progress Note - General Surgery   Verito Fallon 83 y.o. female MRN: [id number]  Unit/Bed#: [facility] 629-01 Encounter: [id number]    Assessment:  83F with RLE hematoma s/p 12/8 RLE debridement/washout, VAC application.    Plan:  - Regular diet  - VAC change 12/11  - Pain and nausea control PRN  - Encourage IS, ambulation  - Home rivaroxaban and prasugrel held  - Bowel regimen  - Lovenox DVT ppx  - PT/OT eval and treat   - F/u CM for discharge planning/home health needs    Subjective:  Reports controlled level of pain. Tolerating diet. Patient denies nausea, vomiting, fever, chills, chest pain, shortness of breath. Denies passing flatus or having bowel movements in past day. Voiding.     Objective:    Vitals:   Afebrile, Normal VS on room air.  Temp:  [97.8 °F (36.6 °C)-98.8 °F (37.1 °C)] 97.8 °F (36.6 °C)  HR:  [70-89] 89  Resp:  [18] 18  BP: (120-164)/(66-78) 120/77  Body mass index is 25.39 kg/m².    Physical Exam:   Gen: NAD, Resting in chair  Neuro: No focal deficits  Head: Normal Cephalic, Atraumatic  Eye: EOMI, No scleral icterus  CV: Regular rate  Pulm: Normal work of breathing, No respiratory distress on RA  Abd: Soft, Mildly Distended, Non-Tender  Ext: RLE with VAC in place. Serosanguinous output, -125 mmHg with good seal. Mildly tender. No appreciable edema.   Skin: Warm, Dry, Intact    I/O:  UO x4 unmeasured      Intake/Output Summary (Last 24 hours) at 12/10/2023 0912  Last data filed at 12/9/2023 2000  Gross per 24 hour   Intake 360 ml   Output 0 ml   Net 360 ml       Lab Results:    Recent Labs     12/08/23  0421 12/09/23  0612   WBC 5.92 7.55   HGB 12.1 12.2    249   SODIUM 142 140   K 3.1* 4.4    106   CO2 29 26   BUN 24 25   CREATININE 1.12 0.88   GLUC 82 120   CALCIUM 8.9 9.3   MG 1.9 2.0   PHOS  --  3.8       ---    Zulay Prather MD  General Surgery PGY-I

## 2023-12-11 LAB
ANION GAP SERPL CALCULATED.3IONS-SCNC: 4 MMOL/L
BASOPHILS # BLD AUTO: 0.06 THOUSANDS/ÂΜL (ref 0–0.1)
BASOPHILS NFR BLD AUTO: 1 % (ref 0–1)
BUN SERPL-MCNC: 23 MG/DL (ref 5–25)
CALCIUM SERPL-MCNC: 8.7 MG/DL (ref 8.4–10.2)
CHLORIDE SERPL-SCNC: 104 MMOL/L (ref 96–108)
CO2 SERPL-SCNC: 29 MMOL/L (ref 21–32)
CREAT SERPL-MCNC: 0.92 MG/DL (ref 0.6–1.3)
EOSINOPHIL # BLD AUTO: 0.18 THOUSAND/ÂΜL (ref 0–0.61)
EOSINOPHIL NFR BLD AUTO: 2 % (ref 0–6)
ERYTHROCYTE [DISTWIDTH] IN BLOOD BY AUTOMATED COUNT: 14.6 % (ref 11.6–15.1)
GFR SERPL CREATININE-BSD FRML MDRD: 57 ML/MIN/1.73SQ M
GLUCOSE SERPL-MCNC: 104 MG/DL (ref 65–140)
HCT VFR BLD AUTO: 40.3 % (ref 34.8–46.1)
HGB BLD-MCNC: 12.6 G/DL (ref 11.5–15.4)
IMM GRANULOCYTES # BLD AUTO: 0.06 THOUSAND/UL (ref 0–0.2)
IMM GRANULOCYTES NFR BLD AUTO: 1 % (ref 0–2)
LYMPHOCYTES # BLD AUTO: 1.32 THOUSANDS/ÂΜL (ref 0.6–4.47)
LYMPHOCYTES NFR BLD AUTO: 16 % (ref 14–44)
MCH RBC QN AUTO: 30 PG (ref 26.8–34.3)
MCHC RBC AUTO-ENTMCNC: 31.3 G/DL (ref 31.4–37.4)
MCV RBC AUTO: 96 FL (ref 82–98)
MONOCYTES # BLD AUTO: 0.75 THOUSAND/ÂΜL (ref 0.17–1.22)
MONOCYTES NFR BLD AUTO: 9 % (ref 4–12)
NEUTROPHILS # BLD AUTO: 5.76 THOUSANDS/ÂΜL (ref 1.85–7.62)
NEUTS SEG NFR BLD AUTO: 71 % (ref 43–75)
NRBC BLD AUTO-RTO: 0 /100 WBCS
PLATELET # BLD AUTO: 211 THOUSANDS/UL (ref 149–390)
PMV BLD AUTO: 10.9 FL (ref 8.9–12.7)
POTASSIUM SERPL-SCNC: 5.5 MMOL/L (ref 3.5–5.3)
RBC # BLD AUTO: 4.2 MILLION/UL (ref 3.81–5.12)
SODIUM SERPL-SCNC: 137 MMOL/L (ref 135–147)
WBC # BLD AUTO: 8.13 THOUSAND/UL (ref 4.31–10.16)

## 2023-12-11 PROCEDURE — 80048 BASIC METABOLIC PNL TOTAL CA: CPT

## 2023-12-11 PROCEDURE — 97605 NEG PRS WND THER DME<=50SQCM: CPT | Performed by: PHYSICIAN ASSISTANT

## 2023-12-11 PROCEDURE — 97163 PT EVAL HIGH COMPLEX 45 MIN: CPT

## 2023-12-11 PROCEDURE — 85025 COMPLETE CBC W/AUTO DIFF WBC: CPT

## 2023-12-11 PROCEDURE — 99232 SBSQ HOSP IP/OBS MODERATE 35: CPT | Performed by: SURGERY

## 2023-12-11 PROCEDURE — 97167 OT EVAL HIGH COMPLEX 60 MIN: CPT

## 2023-12-11 RX ADMIN — LEVETIRACETAM 500 MG: 500 TABLET, FILM COATED ORAL at 19:57

## 2023-12-11 RX ADMIN — SENNOSIDES 8.6 MG: 8.6 TABLET, FILM COATED ORAL at 22:18

## 2023-12-11 RX ADMIN — DOCUSATE SODIUM 100 MG: 100 CAPSULE, LIQUID FILLED ORAL at 08:37

## 2023-12-11 RX ADMIN — ATORVASTATIN CALCIUM 20 MG: 20 TABLET, FILM COATED ORAL at 17:06

## 2023-12-11 RX ADMIN — ENOXAPARIN SODIUM 30 MG: 30 INJECTION SUBCUTANEOUS at 08:37

## 2023-12-11 RX ADMIN — FLUTICASONE PROPIONATE 2 SPRAY: 50 SPRAY, METERED NASAL at 08:38

## 2023-12-11 RX ADMIN — DOCUSATE SODIUM 100 MG: 100 CAPSULE, LIQUID FILLED ORAL at 17:07

## 2023-12-11 RX ADMIN — SERTRALINE 25 MG: 25 TABLET, FILM COATED ORAL at 08:37

## 2023-12-11 RX ADMIN — LEVETIRACETAM 500 MG: 500 TABLET, FILM COATED ORAL at 08:37

## 2023-12-11 RX ADMIN — ENOXAPARIN SODIUM 30 MG: 30 INJECTION SUBCUTANEOUS at 20:00

## 2023-12-11 RX ADMIN — AMIODARONE HYDROCHLORIDE 100 MG: 200 TABLET ORAL at 08:37

## 2023-12-11 NOTE — PHYSICAL THERAPY NOTE
PHYSICAL THERAPY EVALUATION  NAME:  Verito Fallon  DATE: 12/11/23    AGE:   83 y.o.  Mrn:   [id number]  ADMIT DX:  Visit for wound check [Z51.89]  Leg hematoma, right, subsequent encounter [S80.11XD]    Past Medical History:   Diagnosis Date    Anal fissure     Cardiac disorder     Cognitive changes 12/23/2020    Esophageal reflux     Esophagitis, reflux     Hemorrhoids     Hepatic hemangioma     Last Assessed: 1/13/2015    Herpes zoster     History of colonic polyps     Hypertension     Ischemic colitis (HCC)     Lumbar herniated disc     Malignant neoplasm without specification of site (HCC)     Nephrolithiasis     L. Lithotripsy    Nontoxic single thyroid nodule     Last Assessed: 1/13/2015    Osteoarthritis     Overactive bladder     Raynaud disease     Respiratory system disease     Sjogren's disease (HCC)     Spinal stenosis     PONCHO (stress urinary incontinence, female)     Uterovaginal prolapse     Grade I-II       Past Surgical History:   Procedure Laterality Date    APPENDECTOMY  1947    CARDIAC PACEMAKER PLACEMENT  01/2021    CARDIAC SURGERY      CABG    CATARACT EXTRACTION Bilateral     COLONOSCOPY  2012    Fiberoptic    COLONOSCOPY      Resolved: 2006 - 2012 5 year f/u    CORONARY ANGIOPLASTY WITH STENT PLACEMENT      CORONARY ARTERY BYPASS GRAFT      Resolved: 2012    ESOPHAGOGASTRODUODENOSCOPY  2012    Diagnostic    HEMORROIDECTOMY      KNEE SURGERY      LITHOTRIPSY      Renal    MALIGNANT SKIN LESION EXCISION      Face; Resolved: 2004    [place] ESOPHAGOGASTRODUODENOSCOPY TRANSORAL DIAGNOSTIC N/A 4/13/2016    Procedure: EGD AND COLONOSCOPY;  Surgeon: Evelin Bone MD;  Location: [facility];  Service: Gastroenterology    RENAL ARTERY STENT      SKIN LESION EXCISION      Scalp    SOFT TISSUE TUMOR RESECTION      Shoulder; Resolved: 1995    THROMBOLYSIS      Postoperative Thrombolysis PTCA    TONSILLECTOMY      Resolved: 1944       Length Of Stay: 3    PHYSICAL THERAPY EVALUATION:        12/11/23 1117    Note Type   Note type Evaluation   Pain Assessment   Pain Assessment Tool 0-10   Pain Score 8   Pain Location/Orientation Orientation: Right;Location: Leg   Pain Onset/Description Onset: Ongoing;Frequency: Constant/Continuous;Descriptor: Aching   Effect of Pain on Daily Activities increased pain with activity   Patient's Stated Pain Goal No pain   Hospital Pain Intervention(s) Ambulation/increased activity;Repositioned   Restrictions/Precautions   Weight Bearing Precautions Per Order Yes   RUE Weight Bearing Per Order (S)  NWB  (hx of R 5th met fx from previous admission)   Braces or Orthoses Other (Comment)  (ulnar gutter splint)   Other Precautions Cognitive;Chair Alarm;Bed Alarm;Multiple lines;Fall Risk;Pain  (wound vac)   Home Living   Type of Home House   Home Layout One level;Stairs to enter with rails  (3 LUDWIN)   Home Equipment Cane   Additional Comments Pt poor historian. Per pts chart, pt resides in the above setup with spouse who is able to provide assist if needed   Prior Function   Level of [place] Independent with functional mobility   Lives With Spouse   Receives Help From Family   Falls in the last 6 months 1 to 4   Comments Pt poor historian, need to clarify pts PLOF   General   Family/Caregiver Present Yes  (spouse)   Cognition   Overall Cognitive Status Impaired   Arousal/Participation Alert   Orientation Level Oriented to person;Disoriented to place;Disoriented to time;Disoriented to situation   Memory Decreased recall of precautions;Decreased short term memory;Decreased recall of recent events   Following Commands Follows one step commands with increased time or repetition   RUE Assessment   RUE Assessment WFL   LUE Assessment   LUE Assessment WFL   RLE Assessment   RLE Assessment WFL   Strength RLE   RLE Overall Strength 4-/5   LLE Assessment   LLE Assessment WFL   Strength LLE   LLE Overall Strength 4-/5   Bed Mobility   Supine to Sit 4  Minimal assistance   Additional items Assist x  1;Increased time required;Verbal cues   Transfers   Sit to Stand 4  Minimal assistance   Additional items Assist x 1;Increased time required;Verbal cues   Stand to Sit 4  Minimal assistance   Additional items Assist x 1;Increased time required;Verbal cues   Additional Comments cues needed for hand placement during transfers   Ambulation/Elevation   Gait pattern Excessively slow;Short stride;Foward flexed;Inconsistent arcenio   Gait Assistance 4  Minimal assist   Additional items Assist x 1   Assistive Device Other (Comment);SPC  (HHA)   Distance 45ft with HHA, 10 ft with SPC  (pt with increased difficulty utilizing SPC)   Balance   Static Sitting Fair -   Static Standing Poor +   Ambulatory Poor   Endurance Deficit   Endurance Deficit Yes   Endurance Deficit Description fatigue, pain   Activity Tolerance   Activity Tolerance Patient limited by fatigue;Patient limited by pain   Medical Staff Made Aware Kiko, OT; Hakeem, SPT; OT present for co evaluation due to pts current medical presentation   Nurse Made Aware Pt appropriate to be seen and mobilize per nsg   Assessment   Prognosis Good   Problem List Decreased strength;Decreased range of motion;Decreased endurance;Impaired balance;Decreased mobility;Pain;Decreased cognition;Impaired judgement;Decreased safety awareness   Assessment Pt is 83 y.o. female seen for PT evaluation s/p admit to [facility] on 12/7/2023. Two pt identifiers were used to confirm. Pt presented for reevaluation of RLE wound. Pt was previously seen at another campus s/p fall which resulted in R 5th met fx and multiple contusions.   Pt was admitted with a primary dx of: R LE hematoma. Pt underwent RLE debridement/washout, VAC application which was performed on 12/8/23.   PT now consulted for assessment of mobility and d/c needs. Pt with OOB to chair orders.  Pts current co morbidities affecting treatment include:  has a past medical history of Anal fissure, Cardiac disorder, Cognitive  "changes, Esophageal reflux, Esophagitis, reflux, Hemorrhoids, Hepatic hemangioma, Herpes zoster, History of colonic polyps, Hypertension, Ischemic colitis (HCC), Lumbar herniated disc, Malignant neoplasm without specification of site (HCC), Nephrolithiasis, Nontoxic single thyroid nodule, Osteoarthritis, Overactive bladder, Raynaud disease, Respiratory system disease, Sjogren's disease (HCC), Spinal stenosis, PONCHO (stress urinary incontinence, female), and Uterovaginal prolapse. . Pts current clinical presentation is Unstable/ Unpredictable (high complexity) due to Ongoing medical management for primary dx, Decreased activity tolerance compared to baseline, Fall risk, Cog status, Current WBS, Continuous pulse oximetry monitoring , s/p surgical intervention  .  Upon evaluation, pt currently is requiring Min Ax1 for bed mobility; Min Ax1 for transfers and Min Ax1 for ambulation w/  HHA . Pt presents at PT eval functioning below baseline and currently w/ overall mobility deficits 2* to: BLE weakness, impaired balance, decreased endurance, gait deviations, pain, decreased activity tolerance compared to baseline, decreased skin integrity. Pt currently at a fall risk 2* to impairments listed above.  Based on the aforementioned PT evaluation, pt will continue to benefit from skilled Acute PT interventions to address stated impairments; to maximize functional mobility; for ongoing pt/ family training; and DME needs. At conclusion of PT session pt returned back in chair and chair alarm engaged with phone and call bell within reach. PT is currently recommending Rehab. PT will continue to follow during hospital stay.   Barriers to Discharge Inaccessible home environment   Goals   Patient Goals \" to go home\"   [facility] Expiration Date 12/21/23   Short Term Goal #1 In 10 days pt will complete: 1) Bed mobility skills with mod I to increase safety and independence as well as decrease caregiver burden. 2) Functional transfers with S to " promote increased independence, safety, and QOL. 3) Ambulate 150' using least restrictive AD with S without LOB and stable vitals so that pt can negotiate previous living environment safely and promote independence with functional mobility and return to PLOF. 4) Stair training up/ down 3 step/s using rail/s with S so that pt can enter/negotiate previous living environment safely and decrease fall risk. 5) Improve balance grades by 1/2 grade to increase safety with all mobility and decrease fall risk.  6) Improve BLE strength by 1/2 grade to help increase overall functional mobility and decrease fall risk.   Plan   Treatment/Interventions Functional transfer training;LE strengthening/ROM;Elevations;Therapeutic exercise;Endurance training;Patient/family training;Equipment eval/education;Bed mobility;Gait training;Spoke to nursing;OT   PT Frequency 3-5x/wk   Discharge Recommendation   Rehab Resource Intensity Level, PT II (Moderate Resource Intensity)   AM-PAC Basic Mobility Inpatient   Turning in Flat Bed Without Bedrails 3   Lying on Back to Sitting on Edge of Flat Bed Without Bedrails 3   Moving Bed to Chair 3   Standing Up From Chair Using Arms 3   Walk in Room 2   Climb 3-5 Stairs With Railing 2   Basic Mobility Inpatient Raw Score 16   Basic Mobility Standardized Score 38.32   Highest Level Of Mobility   JH-HLM Goal 5: Stand one or more mins   JH-HLM Achieved 7: Walk 25 feet or more   Modified [place] Scale   Modified [place] Scale 4   Barthel Index   Feeding 10   Bathing 0   Grooming Score 0   Dressing Score 5   Bladder Score 10   Bowels Score 10   Toilet Use Score 5   Transfers (Bed/Chair) Score 10   Mobility (Level Surface) Score 0   Stairs Score 0   Barthel Index Score 50   Portions of the documentation may have been created using voice recognition software.Occasional wrong word or sound alike substitutions may have occurred due to the inherent limitations of the voice recognition software. Read the chart  carefully and recognize, using context, where substitutions have occurred.    Anthony Lazcano, PT, DPT

## 2023-12-11 NOTE — ARC ADMISSION
Referral received for consideration of patient for Inpatient Acute Rehab.  We will review patient's case and continue to follow patient's functional progress until a determination can be made.

## 2023-12-11 NOTE — PROGRESS NOTES
Acute Care Surgery  Bedside V.A.C. Procedure Note    A timeout was performed, prior to beginning the dressing change. The nurse remained present to confirm the correct dressing counts on removal of the VAC dressing and was debriefed at the completion of the dressing change.    Location of wound: RLE    Dressings and Foam removed:  1 Black Foam      Dimensions of wound: 4 cm x 4 cm x 1 cm    Description of wound: On inspection of the wound today, wound base appeared healthy and pink. There was no necrotic or nonviable tissue requiring debridement. The periwound skin remains clean and intact and unremarkable.    VAC dressing application:  The periwound skin was cleaned and dried. 1 black foam was cut to size of the wound and placed into the wound. The dressings were then covered with VAC drape. The track pad was then placed over the base of black foam. The VAC was then set to 125 mmHg low Continuous suction. The patient tolerated the procedure well and there were no complications. The patient did not require any excisional debridement during today's dressing change.    VAC settings:  125 mmHg  Continuous    Wound Images:      Total Sponge Count:  1 black    Additional Notes:  The VAC sticker placed over the dressing per protocol.  The next VAC dressing change will be planned for 12/13.  The VAC paperwork was completed and give to the case management staff to arrange home VAC therapy.    This dressing change took greater than 11 minutes to complete.    Moe Kerns PA-C  12/11/2023 3:35 PM

## 2023-12-11 NOTE — PLAN OF CARE
Problem: OCCUPATIONAL THERAPY ADULT  Goal: Performs self-care activities at highest level of function for planned discharge setting.  See evaluation for individualized goals.  Description: Treatment Interventions: ADL retraining, Functional transfer training, Endurance training, Patient/family training, Equipment evaluation/education, Compensatory technique education, Activityengagement          See flowsheet documentation for full assessment, interventions and recommendations.   Note: Limitation: Decreased ADL status, Decreased Safe judgement during ADL, Decreased endurance, Decreased self-care trans, Decreased high-level ADLs  Prognosis: Good, Fair  Assessment: Pt is a 83 y.o. female who was admitted to [facility] on 12/7/2023 with RLE hematoma s/p debridement/washout and application of VAC dressing 12/8 . Patient  has a past medical history of Anal fissure, Cardiac disorder, Cognitive changes, Esophageal reflux, Esophagitis, reflux, Hemorrhoids, Hepatic hemangioma, Herpes zoster, History of colonic polyps, Hypertension, Ischemic colitis (HCC), Lumbar herniated disc, Malignant neoplasm without specification of site (HCC), Nephrolithiasis, Nontoxic single thyroid nodule, Osteoarthritis, Overactive bladder, Raynaud disease, Respiratory system disease, Sjogren's disease (HCC), Spinal stenosis, PONCHO (stress urinary incontinence, female), and Uterovaginal prolapse. At baseline pt was completing adls and mobility independently with support from spouse PRN - spouse assists with iadls. Pt lives with spouse in 1 story home with 3 LUDWIN. Currently pt requires moderate assist for overall ADLS and min assist for functional mobility/transfers. Pt currently presents with impairments in the following categories -steps to enter environment, difficulty performing ADLS, difficulty performing IADLS , and environment activity tolerance, endurance, and standing balance/tolerance. These impairments, as well as pt's fatigue,  pain, risk for falls, and home environment  limit pt's ability to safely engage in all baseline areas of occupation, includingbathing, dressing, toileting, functional mobility/transfers, community mobility, laundry , house maintenance, meal prep, cleaning, social participation , and leisure activities  From OT standpoint, recommend Level II resources upon D/C. OT will continue to follow to address the below stated goals.     Rehab Resource Intensity Level, OT: II (Moderate Resource Intensity)

## 2023-12-11 NOTE — PROGRESS NOTES
Progress Note  Verito Fallon 83 y.o. female MRN: [id number]  Unit/Bed#: [facility] 629-01 Encounter: [id number]    Assessment  83F with RLE hematoma s/p 12/8 RLE debridement/washout, VAC application.     Plan  - regular diet  - vac change today  - lovenox DVT ppx, continue to hold home rivaroxaban, prasugrel  - appreciate PT/OT recs  - dispo planning, CM following    Subjective/Objective   No acute overnight events. Pain controlled. Tolerating diet. Voiding, got OOB to chair yesterday.    VSS on RA.  /53   Pulse 70   Temp 98.1 °F (36.7 °C)   Resp 18   Ht 5' (1.524 m)   Wt 59 kg (130 lb)   SpO2 92%   BMI 25.39 kg/m²     Physical Exam:  General: NAD  HENT: NCAT  CV: nl rate  Lungs: nl wob. No resp distress.  ABD: Soft, ND, NT  Extrem: RLE wound vac in place to -125 suction, no leak, with scant ss drainage. Leg inferior to wound tender to palpation. 2+ DP pulse.  Neuro: alert & oriented    VAC: 0cc, scant ss  UOP: x1  Stool: x1    12/11/23 labs pending  Recent Labs     12/09/23  0612   WBC 7.55   HGB 12.2      SODIUM 140   K 4.4      CO2 26   BUN 25   CREATININE 0.88   GLUC 120   CALCIUM 9.3   MG 2.0   PHOS 3.8

## 2023-12-11 NOTE — CASE MANAGEMENT
Case Management Discharge Planning Note    Patient name Verito Fallon  Location [facility] 629/[facility] 629-01 MRN [id number]  : 1940 Date 2023       Current Admission Date: 2023  Current Admission Diagnosis:Leg hematoma, right, subsequent encounter  Patient Active Problem List    Diagnosis Date Noted    Leg hematoma, right, subsequent encounter 2023    Closed nondisplaced fracture of fifth metacarpal bone of right hand, unspecified portion of metacarpal, initial encounter 2023    Closed fracture of fifth metacarpal bone of right hand 2023    Multiple contusions 2023    Pruritic rash 2023    Wound of right leg 2022    Fall 10/04/2022    Cervical spondylosis     Cervical disc disorder with radiculopathy of mid-cervical region     Acute (reversible) ischemia of small intestine, extent unspecified ([facility]) 2022    Atherosclerosis with claudication of extremity ([facility]) 2021    Carotid stenosis, asymptomatic, bilateral 2021    Mild cognitive impairment 2021    Memory loss 2021    Current use of long term anticoagulation 2021    Long term current use of amiodarone 2021    Renal artery stenosis ([facility]) 2021    Pulmonary hypertension ([facility]) 2021    Sick sinus syndrome ([facility]) 2021    Hx of CABG 2021    CAD (coronary artery disease) 2021    Depression 2021    Seizure-like activity ([facility]) 2021    Hyperlipidemia 2021    Toxic metabolic encephalopathy 2021    Edema of left upper extremity 2021    Urinary retention 2021    A-fib ([facility]) 2021    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  [address] [facility] [place] [address]  Phone: [phone] Fax: [phone]    Primary Care Provider: Robbie Warner DO    Primary Insurance: MEDICARE  Secondary Insurance: AET[employer]    DISCHARGE DETAILS:    Other Referral/Resources/Interventions Provided:  Interventions: Short Term Rehab  Referral Comments: This CM spoke to patient regarding therapy recommendations, patient opting for STR, would like referral to [facility] and [facility], entered in [facility]

## 2023-12-11 NOTE — PLAN OF CARE
Problem: PHYSICAL THERAPY ADULT  Goal: Performs mobility at highest level of function for planned discharge setting.  See evaluation for individualized goals.  Description: Treatment/Interventions: Functional transfer training, LE strengthening/ROM, Elevations, Therapeutic exercise, Endurance training, Patient/family training, Equipment eval/education, Bed mobility, Gait training, Spoke to nursing, OT          See flowsheet documentation for full assessment, interventions and recommendations.  Note: Prognosis: Good  Problem List: Decreased strength, Decreased range of motion, Decreased endurance, Impaired balance, Decreased mobility, Pain, Decreased cognition, Impaired judgement, Decreased safety awareness  Assessment: Pt is 83 y.o. female seen for PT evaluation s/p admit to [facility] on 12/7/2023. Two pt identifiers were used to confirm. Pt presented for reevaluation of RLE wound. Pt was previously seen at another [place] s/p fall which resulted in R 5th met fx and multiple contusions.   Pt was admitted with a primary dx of: R LE hematoma. Pt underwent RLE debridement/washout, VAC application which was performed on 12/8/23.   PT now consulted for assessment of mobility and d/c needs. Pt with OOB to chair orders.  Pts current co morbidities affecting treatment include:  has a past medical history of Anal fissure, Cardiac disorder, Cognitive changes, Esophageal reflux, Esophagitis, reflux, Hemorrhoids, Hepatic hemangioma, Herpes zoster, History of colonic polyps, Hypertension, Ischemic colitis (HCC), Lumbar herniated disc, Malignant neoplasm without specification of site (HCC), Nephrolithiasis, Nontoxic single thyroid nodule, Osteoarthritis, Overactive bladder, Raynaud disease, Respiratory system disease, Sjogren's disease (HCC), Spinal stenosis, PONCHO (stress urinary incontinence, female), and Uterovaginal prolapse. . Pts current clinical presentation is Unstable/ Unpredictable (high complexity) due to  Ongoing medical management for primary dx, Decreased activity tolerance compared to baseline, Fall risk, Cog status, Current WBS, Continuous pulse oximetry monitoring , s/p surgical intervention  .  Upon evaluation, pt currently is requiring Min Ax1 for bed mobility; Min Ax1 for transfers and Min Ax1 for ambulation w/  HHA . Pt presents at PT eval functioning below baseline and currently w/ overall mobility deficits 2* to: BLE weakness, impaired balance, decreased endurance, gait deviations, pain, decreased activity tolerance compared to baseline, decreased skin integrity. Pt currently at a fall risk 2* to impairments listed above.  Based on the aforementioned PT evaluation, pt will continue to benefit from skilled Acute PT interventions to address stated impairments; to maximize functional mobility; for ongoing pt/ family training; and DME needs. At conclusion of PT session pt returned back in chair and chair alarm engaged with phone and call bell within reach. PT is currently recommending Rehab. PT will continue to follow during hospital stay.  Barriers to Discharge: Inaccessible home environment     Rehab Resource Intensity Level, PT: II (Moderate Resource Intensity)    See flowsheet documentation for full assessment.

## 2023-12-11 NOTE — OCCUPATIONAL THERAPY NOTE
Occupational Therapy Evaluation     Patient Name: Verito Fallon  Today's Date: 12/11/2023  Problem List  Active Problems:    Leg hematoma, right, subsequent encounter    Past Medical History  Past Medical History:   Diagnosis Date    Anal fissure     Cardiac disorder     Cognitive changes 12/23/2020    Esophageal reflux     Esophagitis, reflux     Hemorrhoids     Hepatic hemangioma     Last Assessed: 1/13/2015    Herpes zoster     History of colonic polyps     Hypertension     Ischemic colitis (HCC)     Lumbar herniated disc     Malignant neoplasm without specification of site (HCC)     Nephrolithiasis     L. Lithotripsy    Nontoxic single thyroid nodule     Last Assessed: 1/13/2015    Osteoarthritis     Overactive bladder     Raynaud disease     Respiratory system disease     Sjogren's disease (HCC)     Spinal stenosis     PONCHO (stress urinary incontinence, female)     Uterovaginal prolapse     Grade I-II     Past Surgical History  Past Surgical History:   Procedure Laterality Date    APPENDECTOMY  1947    CARDIAC PACEMAKER PLACEMENT  01/2021    CARDIAC SURGERY      CABG    CATARACT EXTRACTION Bilateral     COLONOSCOPY  2012    Fiberoptic    COLONOSCOPY      Resolved: 2006 - 2012 5 year f/u    CORONARY ANGIOPLASTY WITH STENT PLACEMENT      CORONARY ARTERY BYPASS GRAFT      Resolved: 2012    ESOPHAGOGASTRODUODENOSCOPY  2012    Diagnostic    HEMORROIDECTOMY      KNEE SURGERY      LITHOTRIPSY      Renal    MALIGNANT SKIN LESION EXCISION      Face; Resolved: 2004    WA ESOPHAGOGASTRODUODENOSCOPY TRANSORAL DIAGNOSTIC N/A 4/13/2016    Procedure: EGD AND COLONOSCOPY;  Surgeon: Evelin Bone MD;  Location: [facility];  Service: Gastroenterology    RENAL ARTERY STENT      SKIN LESION EXCISION      Scalp    SOFT TISSUE TUMOR RESECTION      Shoulder; Resolved: 1995    THROMBOLYSIS      Postoperative Thrombolysis PTCA    TONSILLECTOMY      Resolved: 1944    WOUND DEBRIDEMENT Right 12/8/2023    Procedure: DEBRIDEMENT LOWER  EXTREMITY (WASH OUT); POSSIBLE VAC APPLICATION;  Surgeon: Abhijeet Davies MD;  Location: [facility];  Service: General         12/11/23 1120   OT Last Visit   OT Visit Date 12/11/23   Note Type   Note type Evaluation   Pain Assessment   Pain Assessment Tool 0-10   Pain Score 8   Pain Location/Orientation Orientation: Right;Location: Leg   Hospital Pain Intervention(s) Repositioned;Ambulation/increased activity;Emotional support;Relaxation technique   Restrictions/Precautions   Weight Bearing Precautions Per Order Yes   RUE Weight Bearing Per Order (S)  NWB   LUE Weight Bearing Per Order WBAT   RLE Weight Bearing Per Order WBAT   LLE Weight Bearing Per Order WBAT   Braces or Orthoses Splint  (ulnar gutter splint)   Other Precautions Cognitive;Chair Alarm;WBS;Fall Risk;Multiple lines;Pain   Home Living   Type of Home House   Home Layout One level;Stairs to enter with rails  (3 LUDWIN)   Home Equipment Cane   Prior Function   Level of [place] Independent with ADLs;Independent with functional mobility;Needs assistance with IADLS   Lives With Spouse   Receives Help From Family   IADLs Family/Friend/Other provides transportation;Family/Friend/Other provides meals;Family/Friend/Other provides medication management   Falls in the last 6 months 1 to 4   Vocational Retired   Lifestyle   Autonomy I adls and mobility w/o ad - spouse assists prn and manages iadls   Reciprocal Relationships supportive family - spouse reports he is able to assist prn   Service to Others retired   Intrinsic Gratification sedentary   Subjective   Subjective offers no c/o   ADL   Eating Assistance 7  Independent   Grooming Assistance 5  Supervision/Setup   UB Bathing Assistance 4  Minimal Assistance   LB Bathing Assistance 3  Moderate Assistance   UB Dressing Assistance 4  Minimal Assistance   LB Dressing Assistance 3  Moderate Assistance   Toileting Assistance  4  Minimal Assistance   Bed Mobility   Supine to Sit 4  Minimal assistance   Transfers    Sit to Stand 4  Minimal assistance   Stand to Sit 4  Minimal assistance   Functional Mobility   Functional Mobility 4  Minimal assistance   Additional items Hand hold assistance   Balance   Static Sitting Fair   Dynamic Sitting Fair -   Static Standing Fair -   Dynamic Standing Poor +   Ambulatory Poor   Activity Tolerance   Activity Tolerance Patient limited by fatigue;Patient limited by pain;Treatment limited secondary to medical complications (Comment)   Medical Staff Made Aware MANJU Cruz and KILO Palacio   RUE Assessment   RUE Assessment X  (ulnar gutter splint intact)   LUE Assessment   LUE Assessment WFL   Cognition   Arousal/Participation Arousable;Cooperative   Attention Attends with cues to redirect   Orientation Level Oriented to person;Oriented to place;Disoriented to time;Disoriented to situation   Memory Decreased short term memory;Decreased recall of recent events;Decreased recall of precautions   Following Commands Follows one step commands with increased time or repetition   Assessment   Limitation Decreased ADL status;Decreased Safe judgement during ADL;Decreased endurance;Decreased self-care trans;Decreased high-level ADLs   Prognosis Good;Fair   Assessment Pt is a 83 y.o. female who was admitted to [facility] on 12/7/2023 with RLE hematoma s/p debridement/washout and application of VAC dressing 12/8 . Patient  has a past medical history of Anal fissure, Cardiac disorder, Cognitive changes, Esophageal reflux, Esophagitis, reflux, Hemorrhoids, Hepatic hemangioma, Herpes zoster, History of colonic polyps, Hypertension, Ischemic colitis (HCC), Lumbar herniated disc, Malignant neoplasm without specification of site (HCC), Nephrolithiasis, Nontoxic single thyroid nodule, Osteoarthritis, Overactive bladder, Raynaud disease, Respiratory system disease, Sjogren's disease (HCC), Spinal stenosis, PONCHO (stress urinary incontinence, female), and Uterovaginal prolapse. At baseline pt was completing  adls and mobility independently with support from spouse PRN - spouse assists with iadls. Pt lives with spouse in 1 story home with 3 LUDWIN. Currently pt requires moderate assist for overall ADLS and min assist for functional mobility/transfers. Pt currently presents with impairments in the following categories -steps to enter environment, difficulty performing ADLS, difficulty performing IADLS , and environment activity tolerance, endurance, and standing balance/tolerance. These impairments, as well as pt's fatigue, pain, risk for falls, and home environment  limit pt's ability to safely engage in all baseline areas of occupation, includingbathing, dressing, toileting, functional mobility/transfers, community mobility, laundry , house maintenance, meal prep, cleaning, social participation , and leisure activities  From OT standpoint, recommend Level II resources upon D/C. OT will continue to follow to address the below stated goals.   Goals   Patient Goals go home   LTG Time Frame 10-14   Long Term Goal #1 refer to established goals below   Plan   Treatment Interventions ADL retraining;Functional transfer training;Endurance training;Patient/family training;Equipment evaluation/education;Compensatory technique education;Activityengagement   Goal Expiration Date 12/25/23   OT Frequency 2-3x/wk   Discharge Recommendation   Rehab Resource Intensity Level, OT II (Moderate Resource Intensity)   AM-PAC Daily Activity Inpatient   Lower Body Dressing 2   Bathing 2   Toileting 2   Upper Body Dressing 3   Grooming 4   Eating 4   Daily Activity Raw Score 17   Daily Activity Standardized Score (Calc for Raw Score >=11) 37.26   AM-PAC Applied Cognition Inpatient   Following a Speech/Presentation 3   Understanding Ordinary Conversation 4   Taking Medications 3   Remembering Where Things Are Placed or Put Away 3   Remembering List of 4-5 Errands 2   Taking Care of Complicated Tasks 2   Applied Cognition Raw Score 17   Applied  Cognition Standardized Score 36.52   End of Consult   Education Provided Yes;Family or social support of family present for education by provider   Patient Position at End of Consult Bedside chair;Bed/Chair alarm activated;All needs within reach   Nurse Communication Nurse aware of consult       OCCUPATIONAL THERAPY GOALS:      *I feeding/grooming after setup  *SBA UB/LB bathing after setup with cues PRN to initiate/sequence and complete tasks  *SBA toileting and clothing management  *SBA bed mobility with fair to fair+ sitting balance on EOB to engage in light self care tasks, adls and enjoyable activities  *SBA functional mob/transfers to/from all surfaces with fair to fair+ balance for participation in dynamic adls  *Assess dme needs  *Pt to participate in ongoing functional cognitive testing with good attention/concentration to assist with safe discharge recommendations             The patient's raw score on the AM-PAC Daily Activity Inpatient Short Form is 17. A raw score of less than 19 suggests the patient may benefit from discharge to post-acute rehabilitation services. Please refer to the recommendation of the Occupational Therapist for safe discharge planning.        Documentation Completed By:    ONEIL Nath/L  [employer] Certified - [id number]

## 2023-12-12 LAB
ANION GAP SERPL CALCULATED.3IONS-SCNC: 4 MMOL/L
BUN SERPL-MCNC: 18 MG/DL (ref 5–25)
CALCIUM SERPL-MCNC: 8.4 MG/DL (ref 8.4–10.2)
CHLORIDE SERPL-SCNC: 109 MMOL/L (ref 96–108)
CO2 SERPL-SCNC: 27 MMOL/L (ref 21–32)
CREAT SERPL-MCNC: 0.93 MG/DL (ref 0.6–1.3)
GFR SERPL CREATININE-BSD FRML MDRD: 56 ML/MIN/1.73SQ M
GLUCOSE SERPL-MCNC: 86 MG/DL (ref 65–140)
POTASSIUM SERPL-SCNC: 4.6 MMOL/L (ref 3.5–5.3)
SODIUM SERPL-SCNC: 140 MMOL/L (ref 135–147)

## 2023-12-12 PROCEDURE — 99232 SBSQ HOSP IP/OBS MODERATE 35: CPT | Performed by: SURGERY

## 2023-12-12 PROCEDURE — 80048 BASIC METABOLIC PNL TOTAL CA: CPT | Performed by: STUDENT IN AN ORGANIZED HEALTH CARE EDUCATION/TRAINING PROGRAM

## 2023-12-12 RX ADMIN — HYDROXYZINE HYDROCHLORIDE 25 MG: 25 TABLET, FILM COATED ORAL at 22:16

## 2023-12-12 RX ADMIN — ENOXAPARIN SODIUM 30 MG: 30 INJECTION SUBCUTANEOUS at 22:14

## 2023-12-12 RX ADMIN — ENOXAPARIN SODIUM 30 MG: 30 INJECTION SUBCUTANEOUS at 09:35

## 2023-12-12 RX ADMIN — LEVETIRACETAM 500 MG: 500 TABLET, FILM COATED ORAL at 09:35

## 2023-12-12 RX ADMIN — ATORVASTATIN CALCIUM 20 MG: 20 TABLET, FILM COATED ORAL at 17:23

## 2023-12-12 RX ADMIN — AMIODARONE HYDROCHLORIDE 100 MG: 200 TABLET ORAL at 09:35

## 2023-12-12 RX ADMIN — SERTRALINE 25 MG: 25 TABLET, FILM COATED ORAL at 09:35

## 2023-12-12 RX ADMIN — DOCUSATE SODIUM 100 MG: 100 CAPSULE, LIQUID FILLED ORAL at 09:35

## 2023-12-12 RX ADMIN — METOPROLOL SUCCINATE 25 MG: 25 TABLET, EXTENDED RELEASE ORAL at 09:35

## 2023-12-12 RX ADMIN — LEVETIRACETAM 500 MG: 500 TABLET, FILM COATED ORAL at 19:01

## 2023-12-12 RX ADMIN — DOCUSATE SODIUM 100 MG: 100 CAPSULE, LIQUID FILLED ORAL at 17:23

## 2023-12-12 NOTE — ARC ADMISSION
After review, patient is denied for ARC.  She does not have the medical necessity needed for acute rehab.  TCU is recommended.

## 2023-12-12 NOTE — CASE MANAGEMENT
Case Management Discharge Planning Note    Patient name Verito Fallon  Location [facility] 629/[facility] 629-01 MRN [id number]  : 1940 Date 2023       Current Admission Date: 2023  Current Admission Diagnosis:Leg hematoma, right, subsequent encounter  Patient Active Problem List    Diagnosis Date Noted    Leg hematoma, right, subsequent encounter 2023    Closed nondisplaced fracture of fifth metacarpal bone of right hand, unspecified portion of metacarpal, initial encounter 2023    Closed fracture of fifth metacarpal bone of right hand 2023    Multiple contusions 2023    Pruritic rash 2023    Wound of right leg 2022    Fall 10/04/2022    Cervical spondylosis     Cervical disc disorder with radiculopathy of mid-cervical region     Acute (reversible) ischemia of small intestine, extent unspecified ([facility]) 2022    Atherosclerosis with claudication of extremity ([facility]) 2021    Carotid stenosis, asymptomatic, bilateral 2021    Mild cognitive impairment 2021    Memory loss 2021    Current use of long term anticoagulation 2021    Long term current use of amiodarone 2021    Renal artery stenosis ([facility]) 2021    Pulmonary hypertension ([facility]) 2021    Sick sinus syndrome ([facility]) 2021    Hx of CABG 2021    CAD (coronary artery disease) 2021    Depression 2021    Seizure-like activity ([facility]) 2021    Hyperlipidemia 2021    Toxic metabolic encephalopathy 2021    Edema of left upper extremity 2021    Urinary retention 2021    A-fib ([facility]) 2021    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  [address] [place] [address]  Phone: [phone] Fax: [phone]    Primary Care Provider: Robbie Warner DO    Primary Insurance: MEDICARE  Secondary Insurance: AETNA    DISCHARGE DETAILS:    Other Referral/Resources/Interventions Provided:  Interventions: Short Term Rehab  Referral Comments: ARC denied admission, patient and spouse interested in TCF.  Requesting TC to granddaughter, Rosita, this CM called Rosita, explained different levels of IP STR.  Rosita states that she will speak with family to decide if they would like any additional referrals placed

## 2023-12-12 NOTE — PLAN OF CARE
Problem: PAIN - ADULT  Goal: Verbalizes/displays adequate comfort level or baseline comfort level  Description: Interventions:  - Encourage patient to monitor pain and request assistance  - Assess pain using appropriate pain scale  - Administer analgesics based on type and severity of pain and evaluate response  - Implement non-pharmacological measures as appropriate and evaluate response  - Consider cultural and social influences on pain and pain management  - Notify physician/advanced practitioner if interventions unsuccessful or patient reports new pain  Outcome: Progressing     Problem: INFECTION - ADULT  Goal: Absence or prevention of progression during hospitalization  Description: INTERVENTIONS:  - Assess and monitor for signs and symptoms of infection  - Monitor lab/diagnostic results  - Monitor all insertion sites, i.e. indwelling lines, tubes, and drains  - Monitor endotracheal if appropriate and nasal secretions for changes in amount and color  - Mart appropriate cooling/warming therapies per order  - Administer medications as ordered  - Instruct and encourage patient and family to use good hand hygiene technique  - Identify and instruct in appropriate isolation precautions for identified infection/condition  Outcome: Progressing  Goal: Absence of fever/infection during neutropenic period  Description: INTERVENTIONS:  - Monitor WBC    Outcome: Progressing     Problem: SAFETY ADULT  Goal: Patient will remain free of falls  Description: INTERVENTIONS:  - Educate patient/family on patient safety including physical limitations  - Instruct patient to call for assistance with activity   - Consult OT/PT to assist with strengthening/mobility   - Keep Call bell within reach  - Keep bed low and locked with side rails adjusted as appropriate  - Keep care items and personal belongings within reach  - Initiate and maintain comfort rounds  - Make Fall Risk Sign visible to staff  - Offer Toileting every 2 Hours,  in advance of need  - Initiate/Maintain on alarm  - Obtain necessary fall risk management equipment:   - Apply yellow socks and bracelet for high fall risk patients  - Consider moving patient to room near nurses station  Outcome: Progressing  Goal: Maintain or return to baseline ADL function  Description: INTERVENTIONS:  -  Assess patient's ability to carry out ADLs; assess patient's baseline for ADL function and identify physical deficits which impact ability to perform ADLs (bathing, care of mouth/teeth, toileting, grooming, dressing, etc.)  - Assess/evaluate cause of self-care deficits   - Assess range of motion  - Assess patient's mobility; develop plan if impaired  - Assess patient's need for assistive devices and provide as appropriate  - Encourage maximum independence but intervene and supervise when necessary  - Involve family in performance of ADLs  - Assess for home care needs following discharge   - Consider OT consult to assist with ADL evaluation and planning for discharge  - Provide patient education as appropriate  Outcome: Progressing  Goal: Maintains/Returns to pre admission functional level  Description: INTERVENTIONS:  - Perform AM-PAC 6 Click Basic Mobility/ Daily Activity assessment daily.  - Set and communicate daily mobility goal to care team and patient/family/caregiver.   - Collaborate with rehabilitation services on mobility goals if consulted  - Perform Range of Motion 3 times a day.  - Reposition patient every 2 hours.  - Dangle patient 3 times a day  - Stand patient 3 times a day  - Ambulate patient 3 times a day  - Out of bed to chair 3 times a day   - Out of bed for meals 3 times a day  - Out of bed for toileting  - Record patient progress and toleration of activity level   Outcome: Progressing     Problem: DISCHARGE PLANNING  Goal: Discharge to home or other facility with appropriate resources  Description: INTERVENTIONS:  - Identify barriers to discharge w/patient and caregiver  -  Arrange for needed discharge resources and transportation as appropriate  - Identify discharge learning needs (meds, wound care, etc.)  - Arrange for interpretive services to assist at discharge as needed  - Refer to Case Management Department for coordinating discharge planning if the patient needs post-hospital services based on physician/advanced practitioner order or complex needs related to functional status, cognitive ability, or social support system  Outcome: Progressing     Problem: Knowledge Deficit  Goal: Patient/family/caregiver demonstrates understanding of disease process, treatment plan, medications, and discharge instructions  Description: Complete learning assessment and assess knowledge base.  Interventions:  - Provide teaching at level of understanding  - Provide teaching via preferred learning methods  Outcome: Progressing

## 2023-12-12 NOTE — PROGRESS NOTES
Progress Note - General Surgery   Verito Fallon 83 y.o. female MRN: [id number]  Unit/Bed#: [facility] 629-01 Encounter: [id number]    Assessment:  83F with RLE hematoma s/p 12/8 RLE debridement/washout, VAC application.       Plan:  - regular diet  - vac changes  - plan to dc today  - lovenox DVT ppx  - appreciate PT/OT recs  - dispo planning, CM following        Subjective/Objective     Subjective:   No acute events overnight.     Objective:     Blood pressure (!) 191/70, pulse 78, temperature 97.8 °F (36.6 °C), resp. rate 16, height 5' (1.524 m), weight 59 kg (130 lb), SpO2 95 %, not currently breastfeeding.,Body mass index is 25.39 kg/m².      Intake/Output Summary (Last 24 hours) at 12/11/2023 2241  Last data filed at 12/11/2023 1900  Gross per 24 hour   Intake 1140 ml   Output 0 ml   Net 1140 ml       Invasive Devices       Peripheral Intravenous Line  Duration             Peripheral IV 12/09/23 Left Antecubital 2 days                    Physical Exam:   Gen: NAD, Comfortable  Neuro: A&O, No focal deficits  Head: Normal Cephalic, Atraumatic  Eye: EOMI, PERRLA, No scleral icterus  Neck: Supple, No JVD, Midline trachea  CV: RRR, Cap refill <2 sec  Pulm: Normal work of breathing, no respiratory distress  Abd: Soft, Non-Distended, Non-Tender  Ext: vac good seal  Skin: warm, dry, intact

## 2023-12-12 NOTE — PROGRESS NOTES
Progress Note  Verito Fallon 83 y.o. female MRN: [id number]  Unit/Bed#: [facility] 629-01 Encounter: [id number]    Assessment  83F with RLE hematoma s/p 12/8 RLE debridement/washout, VAC application.     Plan  - regular diet  - vac change today  - lovenox DVT ppx, continue to hold home rivaroxaban, prasugrel  - dispo planning, PT/OT: II, CM following for STR    Subjective/Objective   No acute overnight events. Pain controlled. Tolerating diet. Voiding, walking.    VSS on RA.  BP (!) 106/45   Pulse 70   Temp 98 °F (36.7 °C)   Resp 18   Ht 5' (1.524 m)   Wt 59 kg (130 lb)   SpO2 97%   BMI 25.39 kg/m²     Physical Exam:  General: NAD  HENT: NCAT  CV: nl rate  Lungs: nl wob. No resp distress.  ABD: Soft, ND, NT  Extrem: RLE wound vac in place to -125 suction, no leak, with scant ss drainage. Leg inferior to wound tender to palpation. 2+ DP pulse.  Neuro: alert & oriented    VAC: scant ss  UOP: x2  Stool: x1    Recent Labs     12/11/23  0625 12/12/23  0636 12/13/23  0505   WBC 8.13  --  5.08   HGB 12.6  --  11.7     --  176   SODIUM 137 140 140   K 5.5* 4.6 4.4    109* 107   CO2 29 27 27   BUN 23 18 17   CREATININE 0.92 0.93 0.85   GLUC 104 86 76   CALCIUM 8.7 8.4 8.6

## 2023-12-13 ENCOUNTER — APPOINTMENT (INPATIENT)
Dept: RADIOLOGY | Facility: HOSPITAL | Age: 83
DRG: 940 | End: 2023-12-13
Payer: MEDICARE

## 2023-12-13 LAB
ANION GAP SERPL CALCULATED.3IONS-SCNC: 6 MMOL/L
BASOPHILS # BLD AUTO: 0.04 THOUSANDS/ÂΜL (ref 0–0.1)
BASOPHILS NFR BLD AUTO: 1 % (ref 0–1)
BUN SERPL-MCNC: 17 MG/DL (ref 5–25)
CALCIUM SERPL-MCNC: 8.6 MG/DL (ref 8.4–10.2)
CHLORIDE SERPL-SCNC: 107 MMOL/L (ref 96–108)
CO2 SERPL-SCNC: 27 MMOL/L (ref 21–32)
CREAT SERPL-MCNC: 0.85 MG/DL (ref 0.6–1.3)
EOSINOPHIL # BLD AUTO: 0.31 THOUSAND/ÂΜL (ref 0–0.61)
EOSINOPHIL NFR BLD AUTO: 6 % (ref 0–6)
ERYTHROCYTE [DISTWIDTH] IN BLOOD BY AUTOMATED COUNT: 14.6 % (ref 11.6–15.1)
GFR SERPL CREATININE-BSD FRML MDRD: 63 ML/MIN/1.73SQ M
GLUCOSE SERPL-MCNC: 76 MG/DL (ref 65–140)
HCT VFR BLD AUTO: 36.4 % (ref 34.8–46.1)
HGB BLD-MCNC: 11.7 G/DL (ref 11.5–15.4)
IMM GRANULOCYTES # BLD AUTO: 0.03 THOUSAND/UL (ref 0–0.2)
IMM GRANULOCYTES NFR BLD AUTO: 1 % (ref 0–2)
LYMPHOCYTES # BLD AUTO: 1.05 THOUSANDS/ÂΜL (ref 0.6–4.47)
LYMPHOCYTES NFR BLD AUTO: 21 % (ref 14–44)
MCH RBC QN AUTO: 30.2 PG (ref 26.8–34.3)
MCHC RBC AUTO-ENTMCNC: 32.1 G/DL (ref 31.4–37.4)
MCV RBC AUTO: 94 FL (ref 82–98)
MONOCYTES # BLD AUTO: 0.56 THOUSAND/ÂΜL (ref 0.17–1.22)
MONOCYTES NFR BLD AUTO: 11 % (ref 4–12)
NEUTROPHILS # BLD AUTO: 3.09 THOUSANDS/ÂΜL (ref 1.85–7.62)
NEUTS SEG NFR BLD AUTO: 60 % (ref 43–75)
NRBC BLD AUTO-RTO: 0 /100 WBCS
PLATELET # BLD AUTO: 176 THOUSANDS/UL (ref 149–390)
PMV BLD AUTO: 11.5 FL (ref 8.9–12.7)
POTASSIUM SERPL-SCNC: 4.4 MMOL/L (ref 3.5–5.3)
RBC # BLD AUTO: 3.88 MILLION/UL (ref 3.81–5.12)
SODIUM SERPL-SCNC: 140 MMOL/L (ref 135–147)
WBC # BLD AUTO: 5.08 THOUSAND/UL (ref 4.31–10.16)

## 2023-12-13 PROCEDURE — 97605 NEG PRS WND THER DME<=50SQCM: CPT | Performed by: PHYSICIAN ASSISTANT

## 2023-12-13 PROCEDURE — 85025 COMPLETE CBC W/AUTO DIFF WBC: CPT | Performed by: NURSE PRACTITIONER

## 2023-12-13 PROCEDURE — 73130 X-RAY EXAM OF HAND: CPT

## 2023-12-13 PROCEDURE — 99232 SBSQ HOSP IP/OBS MODERATE 35: CPT | Performed by: SURGERY

## 2023-12-13 PROCEDURE — 80048 BASIC METABOLIC PNL TOTAL CA: CPT | Performed by: NURSE PRACTITIONER

## 2023-12-13 PROCEDURE — 73110 X-RAY EXAM OF WRIST: CPT

## 2023-12-13 RX ADMIN — DOCUSATE SODIUM 100 MG: 100 CAPSULE, LIQUID FILLED ORAL at 18:36

## 2023-12-13 RX ADMIN — ATORVASTATIN CALCIUM 20 MG: 20 TABLET, FILM COATED ORAL at 18:36

## 2023-12-13 RX ADMIN — LEVETIRACETAM 500 MG: 500 TABLET, FILM COATED ORAL at 09:00

## 2023-12-13 RX ADMIN — DOCUSATE SODIUM 100 MG: 100 CAPSULE, LIQUID FILLED ORAL at 09:00

## 2023-12-13 RX ADMIN — SENNOSIDES 8.6 MG: 8.6 TABLET, FILM COATED ORAL at 21:47

## 2023-12-13 RX ADMIN — AMIODARONE HYDROCHLORIDE 100 MG: 200 TABLET ORAL at 09:00

## 2023-12-13 RX ADMIN — ENOXAPARIN SODIUM 30 MG: 30 INJECTION SUBCUTANEOUS at 21:47

## 2023-12-13 RX ADMIN — LEVETIRACETAM 500 MG: 500 TABLET, FILM COATED ORAL at 18:36

## 2023-12-13 RX ADMIN — ENOXAPARIN SODIUM 30 MG: 30 INJECTION SUBCUTANEOUS at 09:00

## 2023-12-13 RX ADMIN — SERTRALINE 25 MG: 25 TABLET, FILM COATED ORAL at 09:00

## 2023-12-13 RX ADMIN — METOPROLOL SUCCINATE 25 MG: 25 TABLET, EXTENDED RELEASE ORAL at 09:00

## 2023-12-13 NOTE — PLAN OF CARE
Problem: PAIN - ADULT  Goal: Verbalizes/displays adequate comfort level or baseline comfort level  Description: Interventions:  - Encourage patient to monitor pain and request assistance  - Assess pain using appropriate pain scale  - Administer analgesics based on type and severity of pain and evaluate response  - Implement non-pharmacological measures as appropriate and evaluate response  - Consider cultural and social influences on pain and pain management  - Notify physician/advanced practitioner if interventions unsuccessful or patient reports new pain  Outcome: Progressing     Problem: INFECTION - ADULT  Goal: Absence or prevention of progression during hospitalization  Description: INTERVENTIONS:  - Assess and monitor for signs and symptoms of infection  - Monitor lab/diagnostic results  - Monitor all insertion sites, i.e. indwelling lines, tubes, and drains  - Monitor endotracheal if appropriate and nasal secretions for changes in amount and color  - [place] appropriate cooling/warming therapies per order  - Administer medications as ordered  - Instruct and encourage patient and family to use good hand hygiene technique  - Identify and instruct in appropriate isolation precautions for identified infection/condition  Outcome: Progressing  Goal: Absence of fever/infection during neutropenic period  Description: INTERVENTIONS:  - Monitor WBC    Outcome: Progressing     Problem: SAFETY ADULT  Goal: Patient will remain free of falls  Description: INTERVENTIONS:  - Educate patient/family on patient safety including physical limitations  - Instruct patient to call for assistance with activity   - Consult OT/PT to assist with strengthening/mobility   - Keep Call bell within reach  - Keep bed low and locked with side rails adjusted as appropriate  - Keep care items and personal belongings within reach  - Initiate and maintain comfort rounds  - Make Fall Risk Sign visible to staff  - Offer Toileting every 2 Hours,  in advance of need  - Initiate/Maintain on alarm  - Obtain necessary fall risk management equipment:   - Apply yellow socks and bracelet for high fall risk patients  - Consider moving patient to room near nurses station  Outcome: Progressing  Goal: Maintain or return to baseline ADL function  Description: INTERVENTIONS:  -  Assess patient's ability to carry out ADLs; assess patient's baseline for ADL function and identify physical deficits which impact ability to perform ADLs (bathing, care of mouth/teeth, toileting, grooming, dressing, etc.)  - Assess/evaluate cause of self-care deficits   - Assess range of motion  - Assess patient's mobility; develop plan if impaired  - Assess patient's need for assistive devices and provide as appropriate  - Encourage maximum independence but intervene and supervise when necessary  - Involve family in performance of ADLs  - Assess for home care needs following discharge   - Consider OT consult to assist with ADL evaluation and planning for discharge  - Provide patient education as appropriate  Outcome: Progressing  Goal: Maintains/Returns to pre admission functional level  Description: INTERVENTIONS:  - Perform AM-PAC 6 Click Basic Mobility/ Daily Activity assessment daily.  - Set and communicate daily mobility goal to care team and patient/family/caregiver.   - Collaborate with rehabilitation services on mobility goals if consulted  - Perform Range of Motion 3 times a day.  - Reposition patient every 2 hours.  - Dangle patient 3 times a day  - Stand patient 3 times a day  - Ambulate patient 3 times a day  - Out of bed to chair 3 times a day   - Out of bed for meals 3 times a day  - Out of bed for toileting  - Record patient progress and toleration of activity level   Outcome: Progressing     Problem: DISCHARGE PLANNING  Goal: Discharge to home or other facility with appropriate resources  Description: INTERVENTIONS:  - Identify barriers to discharge w/patient and caregiver  -  Arrange for needed discharge resources and transportation as appropriate  - Identify discharge learning needs (meds, wound care, etc.)  - Arrange for interpretive services to assist at discharge as needed  - Refer to Case Management Department for coordinating discharge planning if the patient needs post-hospital services based on physician/advanced practitioner order or complex needs related to functional status, cognitive ability, or social support system  Outcome: Progressing     Problem: Knowledge Deficit  Goal: Patient/family/caregiver demonstrates understanding of disease process, treatment plan, medications, and discharge instructions  Description: Complete learning assessment and assess knowledge base.  Interventions:  - Provide teaching at level of understanding  - Provide teaching via preferred learning methods  Outcome: Progressing     Problem: Prexisting or High Potential for Compromised Skin Integrity  Goal: Skin integrity is maintained or improved  Description: INTERVENTIONS:  - Identify patients at risk for skin breakdown  - Assess and monitor skin integrity  - Assess and monitor nutrition and hydration status  - Monitor labs   - Assess for incontinence   - Turn and reposition patient  - Assist with mobility/ambulation  - Relieve pressure over bony prominences  - Avoid friction and shearing  - Provide appropriate hygiene as needed including keeping skin clean and dry  - Evaluate need for skin moisturizer/barrier cream  - Collaborate with interdisciplinary team   - Patient/family teaching  - Consider wound care consult   Outcome: Progressing

## 2023-12-13 NOTE — PROGRESS NOTES
Acute Care Surgery  Bedside V.A.C. Procedure Note    A timeout was performed, prior to beginning the dressing change. The nurse remained present to confirm the correct dressing counts on removal of the VAC dressing and was debriefed at the completion of the dressing change.    Location of wound: RLE anterior shin    Dressings and Foam removed:  1 Black Foam    Dimensions of wound: 4 cm x 4 cm x 1 cm    Description of wound: On inspection of the wound today, wound base was healthy and pink. There was no necrotic or nonviable tissue requiring debridement. The periwound skin remains clean and intact and unremarkable.    VAC dressing application:  The periwound skin was cleaned and dried. 1 black foam were cut to size of the wound and placed into the wound. The dressings were then covered with VAC drape. The track pad was then placed over the base of black foam. The VAC was then set to 125 mmHg low Continuous suction. The patient tolerated the procedure well and there were no complications. The patient did not require any excisional debridement during today's dressing change.    VAC settings:  125 mmHg  Continuous    Wound Images:      Total VAC count:  1 Black Sponge    Additional Notes:  The VAC sticker placed over the dressing per protocol.  The next VAC dressing change will be planned for 12/15/23.  The VAC paperwork was completed and give to the case management staff to arrange home VAC therapy.      This dressing change took greater than 11 minutes to complete.    Moe Kerns PA-C  12/13/2023 12:06 PM

## 2023-12-14 ENCOUNTER — ANESTHESIA EVENT (INPATIENT)
Dept: PERIOP | Facility: HOSPITAL | Age: 83
DRG: 940 | End: 2023-12-14
Payer: MEDICARE

## 2023-12-14 ENCOUNTER — ANESTHESIA (INPATIENT)
Dept: PERIOP | Facility: HOSPITAL | Age: 83
DRG: 940 | End: 2023-12-14
Payer: MEDICARE

## 2023-12-14 LAB — SARS-COV-2 RNA RESP QL NAA+PROBE: NEGATIVE

## 2023-12-14 PROCEDURE — 87635 SARS-COV-2 COVID-19 AMP PRB: CPT | Performed by: NURSE PRACTITIONER

## 2023-12-14 PROCEDURE — 0HRKX74 REPLACEMENT OF RIGHT LOWER LEG SKIN WITH AUTOLOGOUS TISSUE SUBSTITUTE, PARTIAL THICKNESS, EXTERNAL APPROACH: ICD-10-PCS | Performed by: SURGERY

## 2023-12-14 PROCEDURE — 15002 WOUND PREP TRK/ARM/LEG: CPT | Performed by: SURGERY

## 2023-12-14 PROCEDURE — 15100 SPLT AGRFT T/A/L 1ST 100SQCM: CPT | Performed by: SURGERY

## 2023-12-14 PROCEDURE — 99232 SBSQ HOSP IP/OBS MODERATE 35: CPT | Performed by: SURGERY

## 2023-12-14 RX ORDER — FENTANYL CITRATE 50 UG/ML
INJECTION, SOLUTION INTRAMUSCULAR; INTRAVENOUS AS NEEDED
Status: DISCONTINUED | OUTPATIENT
Start: 2023-12-14 | End: 2023-12-14

## 2023-12-14 RX ORDER — LIDOCAINE HYDROCHLORIDE 10 MG/ML
INJECTION, SOLUTION EPIDURAL; INFILTRATION; INTRACAUDAL; PERINEURAL AS NEEDED
Status: DISCONTINUED | OUTPATIENT
Start: 2023-12-14 | End: 2023-12-14

## 2023-12-14 RX ORDER — HYDROMORPHONE HCL/PF 1 MG/ML
0.5 SYRINGE (ML) INJECTION
Status: DISCONTINUED | OUTPATIENT
Start: 2023-12-14 | End: 2023-12-14 | Stop reason: HOSPADM

## 2023-12-14 RX ORDER — SODIUM CHLORIDE, SODIUM LACTATE, POTASSIUM CHLORIDE, CALCIUM CHLORIDE 600; 310; 30; 20 MG/100ML; MG/100ML; MG/100ML; MG/100ML
INJECTION, SOLUTION INTRAVENOUS CONTINUOUS PRN
Status: DISCONTINUED | OUTPATIENT
Start: 2023-12-14 | End: 2023-12-14

## 2023-12-14 RX ORDER — FENTANYL CITRATE/PF 50 MCG/ML
25 SYRINGE (ML) INJECTION
Status: DISCONTINUED | OUTPATIENT
Start: 2023-12-14 | End: 2023-12-14 | Stop reason: HOSPADM

## 2023-12-14 RX ORDER — MINERAL OIL
OIL (ML) MISCELLANEOUS AS NEEDED
Status: DISCONTINUED | OUTPATIENT
Start: 2023-12-14 | End: 2023-12-14 | Stop reason: HOSPADM

## 2023-12-14 RX ORDER — LIDOCAINE HYDROCHLORIDE AND EPINEPHRINE 10; 10 MG/ML; UG/ML
INJECTION, SOLUTION INFILTRATION; PERINEURAL AS NEEDED
Status: DISCONTINUED | OUTPATIENT
Start: 2023-12-14 | End: 2023-12-14 | Stop reason: HOSPADM

## 2023-12-14 RX ORDER — ONDANSETRON 2 MG/ML
4 INJECTION INTRAMUSCULAR; INTRAVENOUS ONCE AS NEEDED
Status: DISCONTINUED | OUTPATIENT
Start: 2023-12-14 | End: 2023-12-14 | Stop reason: HOSPADM

## 2023-12-14 RX ORDER — DEXAMETHASONE SODIUM PHOSPHATE 10 MG/ML
INJECTION, SOLUTION INTRAMUSCULAR; INTRAVENOUS AS NEEDED
Status: DISCONTINUED | OUTPATIENT
Start: 2023-12-14 | End: 2023-12-14

## 2023-12-14 RX ORDER — METOCLOPRAMIDE HYDROCHLORIDE 5 MG/ML
10 INJECTION INTRAMUSCULAR; INTRAVENOUS ONCE AS NEEDED
Status: DISCONTINUED | OUTPATIENT
Start: 2023-12-14 | End: 2023-12-14 | Stop reason: HOSPADM

## 2023-12-14 RX ORDER — LABETALOL HYDROCHLORIDE 5 MG/ML
10 INJECTION, SOLUTION INTRAVENOUS ONCE
Status: DISCONTINUED | OUTPATIENT
Start: 2023-12-14 | End: 2023-12-14 | Stop reason: HOSPADM

## 2023-12-14 RX ORDER — PROPOFOL 10 MG/ML
INJECTION, EMULSION INTRAVENOUS AS NEEDED
Status: DISCONTINUED | OUTPATIENT
Start: 2023-12-14 | End: 2023-12-14

## 2023-12-14 RX ORDER — CEFAZOLIN SODIUM 1 G/3ML
INJECTION, POWDER, FOR SOLUTION INTRAMUSCULAR; INTRAVENOUS AS NEEDED
Status: DISCONTINUED | OUTPATIENT
Start: 2023-12-14 | End: 2023-12-14

## 2023-12-14 RX ADMIN — FENTANYL CITRATE 25 MCG: 50 INJECTION INTRAMUSCULAR; INTRAVENOUS at 21:30

## 2023-12-14 RX ADMIN — FENTANYL CITRATE 50 MCG: 50 INJECTION INTRAMUSCULAR; INTRAVENOUS at 19:49

## 2023-12-14 RX ADMIN — LIDOCAINE HYDROCHLORIDE 50 MG: 10 INJECTION, SOLUTION EPIDURAL; INFILTRATION; INTRACAUDAL; PERINEURAL at 19:49

## 2023-12-14 RX ADMIN — AMIODARONE HYDROCHLORIDE 100 MG: 200 TABLET ORAL at 08:50

## 2023-12-14 RX ADMIN — NICARDIPINE HYDROCHLORIDE 200 MCG: 2.5 INJECTION, SOLUTION INTRAVENOUS at 20:50

## 2023-12-14 RX ADMIN — FENTANYL CITRATE 25 MCG: 50 INJECTION INTRAMUSCULAR; INTRAVENOUS at 20:46

## 2023-12-14 RX ADMIN — METOPROLOL SUCCINATE 25 MG: 25 TABLET, EXTENDED RELEASE ORAL at 08:50

## 2023-12-14 RX ADMIN — LEVETIRACETAM 500 MG: 500 TABLET, FILM COATED ORAL at 08:50

## 2023-12-14 RX ADMIN — DEXAMETHASONE SODIUM PHOSPHATE 5 MG: 10 INJECTION INTRAMUSCULAR; INTRAVENOUS at 20:04

## 2023-12-14 RX ADMIN — PROPOFOL 40 MG: 10 INJECTION, EMULSION INTRAVENOUS at 19:49

## 2023-12-14 RX ADMIN — CEFAZOLIN 1000 MG: 1 INJECTION, POWDER, FOR SOLUTION INTRAMUSCULAR; INTRAVENOUS at 19:55

## 2023-12-14 RX ADMIN — ENOXAPARIN SODIUM 30 MG: 30 INJECTION SUBCUTANEOUS at 08:50

## 2023-12-14 RX ADMIN — SERTRALINE 25 MG: 25 TABLET, FILM COATED ORAL at 08:50

## 2023-12-14 RX ADMIN — FENTANYL CITRATE 25 MCG: 50 INJECTION INTRAMUSCULAR; INTRAVENOUS at 20:32

## 2023-12-14 RX ADMIN — SODIUM CHLORIDE, SODIUM LACTATE, POTASSIUM CHLORIDE, AND CALCIUM CHLORIDE: .6; .31; .03; .02 INJECTION, SOLUTION INTRAVENOUS at 19:49

## 2023-12-14 RX ADMIN — PHENYLEPHRINE HYDROCHLORIDE 20 MCG/MIN: 10 INJECTION INTRAVENOUS at 20:09

## 2023-12-14 RX ADMIN — NICARDIPINE HYDROCHLORIDE 200 MCG: 2.5 INJECTION, SOLUTION INTRAVENOUS at 21:13

## 2023-12-14 NOTE — PLAN OF CARE
Problem: SAFETY ADULT  Goal: Patient will remain free of falls  Description: INTERVENTIONS:  - Educate patient/family on patient safety including physical limitations  - Instruct patient to call for assistance with activity   - Consult OT/PT to assist with strengthening/mobility   - Keep Call bell within reach  - Keep bed low and locked with side rails adjusted as appropriate  - Keep care items and personal belongings within reach  - Initiate and maintain comfort rounds  - Make Fall Risk Sign visible to staff  - Offer Toileting every 2 Hours, in advance of need  - Initiate/Maintain on alarm  - Obtain necessary fall risk management equipment:   - Apply yellow socks and bracelet for high fall risk patients  - Consider moving patient to room near nurses station  Outcome: Progressing  Goal: Maintain or return to baseline ADL function  Description: INTERVENTIONS:  -  Assess patient's ability to carry out ADLs; assess patient's baseline for ADL function and identify physical deficits which impact ability to perform ADLs (bathing, care of mouth/teeth, toileting, grooming, dressing, etc.)  - Assess/evaluate cause of self-care deficits   - Assess range of motion  - Assess patient's mobility; develop plan if impaired  - Assess patient's need for assistive devices and provide as appropriate  - Encourage maximum independence but intervene and supervise when necessary  - Involve family in performance of ADLs  - Assess for home care needs following discharge   - Consider OT consult to assist with ADL evaluation and planning for discharge  - Provide patient education as appropriate  Outcome: Progressing  Goal: Maintains/Returns to pre admission functional level  Description: INTERVENTIONS:  - Perform AM-PAC 6 Click Basic Mobility/ Daily Activity assessment daily.  - Set and communicate daily mobility goal to care team and patient/family/caregiver.   - Collaborate with rehabilitation services on mobility goals if consulted  -  Perform Range of Motion 3 times a day.  - Reposition patient every 2 hours.  - Dangle patient 3 times a day  - Stand patient 3 times a day  - Ambulate patient 3 times a day  - Out of bed to chair 3 times a day   - Out of bed for meals 3 times a day  - Out of bed for toileting  - Record patient progress and toleration of activity level   Outcome: Progressing     Problem: Knowledge Deficit  Goal: Patient/family/caregiver demonstrates understanding of disease process, treatment plan, medications, and discharge instructions  Description: Complete learning assessment and assess knowledge base.  Interventions:  - Provide teaching at level of understanding  - Provide teaching via preferred learning methods  Outcome: Progressing     Problem: Prexisting or High Potential for Compromised Skin Integrity  Goal: Skin integrity is maintained or improved  Description: INTERVENTIONS:  - Identify patients at risk for skin breakdown  - Assess and monitor skin integrity  - Assess and monitor nutrition and hydration status  - Monitor labs   - Assess for incontinence   - Turn and reposition patient  - Assist with mobility/ambulation  - Relieve pressure over bony prominences  - Avoid friction and shearing  - Provide appropriate hygiene as needed including keeping skin clean and dry  - Evaluate need for skin moisturizer/barrier cream  - Collaborate with interdisciplinary team   - Patient/family teaching  - Consider wound care consult   Outcome: Progressing

## 2023-12-14 NOTE — CASE MANAGEMENT
Case Management Discharge Planning Note    Patient name Verito Fallon  Location [facility] 629/[facility] 629-01 MRN [id number]  : 1940 Date 2023       Current Admission Date: 2023  Current Admission Diagnosis:Leg hematoma, right, subsequent encounter  Patient Active Problem List    Diagnosis Date Noted    Leg hematoma, right, subsequent encounter 2023    Closed nondisplaced fracture of fifth metacarpal bone of right hand, unspecified portion of metacarpal, initial encounter 2023    Closed fracture of fifth metacarpal bone of right hand 2023    Multiple contusions 2023    Pruritic rash 2023    Wound of right leg 2022    Fall 10/04/2022    Cervical spondylosis     Cervical disc disorder with radiculopathy of mid-cervical region     Acute (reversible) ischemia of small intestine, extent unspecified ([facility]) 2022    Atherosclerosis with claudication of extremity ([facility]) 2021    Carotid stenosis, asymptomatic, bilateral 2021    Mild cognitive impairment 2021    Memory loss 2021    Current use of long term anticoagulation 2021    Long term current use of amiodarone 2021    Renal artery stenosis ([facility]) 2021    Pulmonary hypertension ([facility]) 2021    Sick sinus syndrome ([facility]) 2021    Hx of CABG 2021    CAD (coronary artery disease) 2021    Depression 2021    Seizure-like activity ([facility]) 2021    Hyperlipidemia 2021    Toxic metabolic encephalopathy 2021    Edema of left upper extremity 2021    Urinary retention 2021    A-fib ([facility]) 2021    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  [address]  [place] [address]  Phone: [phone] Fax: [phone]    Primary Care Provider: Robbie Warner DO    Primary Insurance: MEDICARE  Secondary Insurance: AETNA    DISCHARGE DETAILS:    Other Referral/Resources/Interventions Provided:  Interventions: Short Term Rehab    Transport at Discharge : [facility]  Dispatcher Contacted: Yes  Number/Name of Dispatcher: [facility]  Transported by (Company and Unit #): [facility]  ETA of Transport (Date): 12/15/23  ETA of Transport (Time): 1400    Accepting Facility Name, City & State : [facility]  Receiving Facility/Agency Phone Number: [phone]  Facility/Agency Fax Number: [phone]

## 2023-12-14 NOTE — PROCEDURES
Midline Insertion    Date/Time: 12/14/2023 3:00 PM    Performed by: Inge Vieyra RN  Authorized by: Abhijeet Davies MD    Patient location:  Bedside  Consent:     Consent obtained: not required.    Alternatives discussed:  No treatment  Universal protocol:     Procedure explained and questions answered to patient or proxy's satisfaction: yes      Relevant documents present and verified: yes      Site/side marked: yes      Immediately prior to procedure, a time out was called: yes      Patient identity confirmed:  Arm band, provided demographic data, hospital-assigned identification number and anonymous protocol, patient vented/unresponsive  Pre-procedure details:     Hand hygiene: Hand hygiene performed prior to insertion      Sterile barrier technique: All elements of maximal sterile technique followed      Skin preparation:  ChloraPrep    Skin preparation agent: Skin preparation agent completely dried prior to procedure    Indications:     Midline indications: no peripheral vascular access    Procedure details:     Location:  Right basilic    Laterality:  Right    Site selection rationale:  Largest most patent vessel    Catheter size:  18 gauge    Landmarks identified: yes      Ultrasound guidance: yes      Ultrasound image availability:  Not saved    Sterile ultrasound techniques: Sterile gel and sterile probe covers were used      Number of attempts:  1    Successful placement: yes      Catheter length (cm):  10    Exposed catheter length (cm):  0    Arm circumference (cm):  28    Lot number:  [id number] 2024-05-31  Post-procedure details:     Post-procedure:  Dressing applied and securement device placed    Assessment:  Blood return through all ports and free fluid flow    Post-procedure complications: none      Patient tolerance of procedure:  Tolerated well, no immediate complications

## 2023-12-14 NOTE — PROGRESS NOTES
Progress Note - General Surgery   Verito Fallon 83 y.o. female MRN: [id number]  Unit/Bed#: [facility] 629-01 Encounter: [id number]    Assessment:  83F with RLE hematoma s/p 12/8 RLE debridement/washout, VAC application.       Plan:  - NPO for skin graft today  - lovenox DVT ppx, continue to hold home rivaroxaban, prasugrel  - dispo planning, PT/OT: II, CM following for STR        Subjective/Objective     Subjective:   No acute events overnight.  Pain controlled.    Objective:     Blood pressure 107/57, pulse 71, temperature 98.4 °F (36.9 °C), resp. rate 15, height 5' (1.524 m), weight 59 kg (130 lb), SpO2 96%, not currently breastfeeding.,Body mass index is 25.39 kg/m².      Intake/Output Summary (Last 24 hours) at 12/13/2023 2101  Last data filed at 12/13/2023 1301  Gross per 24 hour   Intake 480 ml   Output --   Net 480 ml       Invasive Devices       Peripheral Intravenous Line  Duration             Peripheral IV 12/09/23 Left Antecubital 4 days                    Physical Exam:   Gen: NAD, Comfortable  Neuro: A&O, No focal deficits  Head: Normal Cephalic, Atraumatic  Eye: EOMI, PERRLA, No scleral icterus  Neck: Supple, No JVD, Midline trachea  CV: RRR, Cap refill <2 sec  Pulm: Normal work of breathing, no respiratory distress  Abd: Soft, Non-Distended, Non-Tender  Ext: No edema, Non-tender  Skin: VAC good seal

## 2023-12-14 NOTE — PLAN OF CARE
Problem: PAIN - ADULT  Goal: Verbalizes/displays adequate comfort level or baseline comfort level  Description: Interventions:  - Encourage patient to monitor pain and request assistance  - Assess pain using appropriate pain scale  - Administer analgesics based on type and severity of pain and evaluate response  - Implement non-pharmacological measures as appropriate and evaluate response  - Consider cultural and social influences on pain and pain management  - Notify physician/advanced practitioner if interventions unsuccessful or patient reports new pain  Outcome: Progressing     Problem: INFECTION - ADULT  Goal: Absence or prevention of progression during hospitalization  Description: INTERVENTIONS:  - Assess and monitor for signs and symptoms of infection  - Monitor lab/diagnostic results  - Monitor all insertion sites, i.e. indwelling lines, tubes, and drains  - Monitor endotracheal if appropriate and nasal secretions for changes in amount and color  - [place] appropriate cooling/warming therapies per order  - Administer medications as ordered  - Instruct and encourage patient and family to use good hand hygiene technique  - Identify and instruct in appropriate isolation precautions for identified infection/condition  Outcome: Progressing  Goal: Absence of fever/infection during neutropenic period  Description: INTERVENTIONS:  - Monitor WBC    Outcome: Progressing     Problem: SAFETY ADULT  Goal: Patient will remain free of falls  Description: INTERVENTIONS:  - Educate patient/family on patient safety including physical limitations  - Instruct patient to call for assistance with activity   - Consult OT/PT to assist with strengthening/mobility   - Keep Call bell within reach  - Keep bed low and locked with side rails adjusted as appropriate  - Keep care items and personal belongings within reach  - Initiate and maintain comfort rounds  - Make Fall Risk Sign visible to staff  - Offer Toileting every 2 Hours,  in advance of need  - Initiate/Maintain on alarm  - Obtain necessary fall risk management equipment:   - Apply yellow socks and bracelet for high fall risk patients  - Consider moving patient to room near nurses station  Outcome: Progressing  Goal: Maintain or return to baseline ADL function  Description: INTERVENTIONS:  -  Assess patient's ability to carry out ADLs; assess patient's baseline for ADL function and identify physical deficits which impact ability to perform ADLs (bathing, care of mouth/teeth, toileting, grooming, dressing, etc.)  - Assess/evaluate cause of self-care deficits   - Assess range of motion  - Assess patient's mobility; develop plan if impaired  - Assess patient's need for assistive devices and provide as appropriate  - Encourage maximum independence but intervene and supervise when necessary  - Involve family in performance of ADLs  - Assess for home care needs following discharge   - Consider OT consult to assist with ADL evaluation and planning for discharge  - Provide patient education as appropriate  Outcome: Progressing  Goal: Maintains/Returns to pre admission functional level  Description: INTERVENTIONS:  - Perform AM-PAC 6 Click Basic Mobility/ Daily Activity assessment daily.  - Set and communicate daily mobility goal to care team and patient/family/caregiver.   - Collaborate with rehabilitation services on mobility goals if consulted  - Perform Range of Motion 3 times a day.  - Reposition patient every 2 hours.  - Dangle patient 3 times a day  - Stand patient 3 times a day  - Ambulate patient 3 times a day  - Out of bed to chair 3 times a day   - Out of bed for meals 3 times a day  - Out of bed for toileting  - Record patient progress and toleration of activity level   Outcome: Progressing     Problem: DISCHARGE PLANNING  Goal: Discharge to home or other facility with appropriate resources  Description: INTERVENTIONS:  - Identify barriers to discharge w/patient and caregiver  -  Arrange for needed discharge resources and transportation as appropriate  - Identify discharge learning needs (meds, wound care, etc.)  - Arrange for interpretive services to assist at discharge as needed  - Refer to Case Management Department for coordinating discharge planning if the patient needs post-hospital services based on physician/advanced practitioner order or complex needs related to functional status, cognitive ability, or social support system  Outcome: Progressing     Problem: Knowledge Deficit  Goal: Patient/family/caregiver demonstrates understanding of disease process, treatment plan, medications, and discharge instructions  Description: Complete learning assessment and assess knowledge base.  Interventions:  - Provide teaching at level of understanding  - Provide teaching via preferred learning methods  Outcome: Progressing     Problem: Prexisting or High Potential for Compromised Skin Integrity  Goal: Skin integrity is maintained or improved  Description: INTERVENTIONS:  - Identify patients at risk for skin breakdown  - Assess and monitor skin integrity  - Assess and monitor nutrition and hydration status  - Monitor labs   - Assess for incontinence   - Turn and reposition patient  - Assist with mobility/ambulation  - Relieve pressure over bony prominences  - Avoid friction and shearing  - Provide appropriate hygiene as needed including keeping skin clean and dry  - Evaluate need for skin moisturizer/barrier cream  - Collaborate with interdisciplinary team   - Patient/family teaching  - Consider wound care consult   Outcome: Progressing

## 2023-12-15 ENCOUNTER — NURSING HOME VISIT (OUTPATIENT)
Dept: GERIATRICS | Facility: OTHER | Age: 83
End: 2023-12-15
Payer: MEDICARE

## 2023-12-15 VITALS
SYSTOLIC BLOOD PRESSURE: 137 MMHG | OXYGEN SATURATION: 96 % | WEIGHT: 130 LBS | HEIGHT: 60 IN | BODY MASS INDEX: 25.52 KG/M2 | TEMPERATURE: 97.9 F | DIASTOLIC BLOOD PRESSURE: 56 MMHG | RESPIRATION RATE: 16 BRPM | HEART RATE: 74 BPM

## 2023-12-15 DIAGNOSIS — Z91.89 AT RISK FOR CONSTIPATION: ICD-10-CM

## 2023-12-15 DIAGNOSIS — S62.306D CLOSED DISPLACED FRACTURE OF FIFTH METACARPAL BONE OF RIGHT HAND WITH ROUTINE HEALING, UNSPECIFIED PORTION OF METACARPAL, SUBSEQUENT ENCOUNTER: ICD-10-CM

## 2023-12-15 DIAGNOSIS — I25.810 CORONARY ARTERY DISEASE INVOLVING OTHER CORONARY ARTERY BYPASS GRAFT WITHOUT ANGINA PECTORIS: ICD-10-CM

## 2023-12-15 DIAGNOSIS — R41.89 COGNITIVE IMPAIRMENT: ICD-10-CM

## 2023-12-15 DIAGNOSIS — I73.9 PAD (PERIPHERAL ARTERY DISEASE) (HCC): ICD-10-CM

## 2023-12-15 DIAGNOSIS — I48.0 PAROXYSMAL ATRIAL FIBRILLATION (HCC): ICD-10-CM

## 2023-12-15 DIAGNOSIS — E78.2 MIXED HYPERLIPIDEMIA: ICD-10-CM

## 2023-12-15 DIAGNOSIS — I50.42 CHRONIC COMBINED SYSTOLIC AND DIASTOLIC CONGESTIVE HEART FAILURE (HCC): ICD-10-CM

## 2023-12-15 DIAGNOSIS — Z71.89 ADVANCE CARE PLANNING: ICD-10-CM

## 2023-12-15 DIAGNOSIS — I10 ESSENTIAL HYPERTENSION: Chronic | ICD-10-CM

## 2023-12-15 DIAGNOSIS — E87.5 HYPERKALEMIA: ICD-10-CM
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
## 2023-12-15 DIAGNOSIS — F32.89 OTHER DEPRESSION: ICD-10-CM

## 2023-12-15 DIAGNOSIS — K21.9 GASTROESOPHAGEAL REFLUX DISEASE WITHOUT ESOPHAGITIS: ICD-10-CM

## 2023-12-15 DIAGNOSIS — Z87.898 HISTORY OF SEIZURE: ICD-10-CM

## 2023-12-15 DIAGNOSIS — S80.11XD LEG HEMATOMA, RIGHT, SUBSEQUENT ENCOUNTER: Primary | ICD-10-CM

## 2023-12-15 DIAGNOSIS — Z91.89 AT RISK FOR DELIRIUM: ICD-10-CM

## 2023-12-15 DIAGNOSIS — I49.5 SICK SINUS SYNDROME (HCC): ICD-10-CM

## 2023-12-15 LAB
ANION GAP SERPL CALCULATED.3IONS-SCNC: 8 MMOL/L
BASOPHILS # BLD AUTO: 0.01 THOUSANDS/ÂΜL (ref 0–0.1)
BASOPHILS NFR BLD AUTO: 0 % (ref 0–1)
BUN SERPL-MCNC: 14 MG/DL (ref 5–25)
CALCIUM SERPL-MCNC: 8.8 MG/DL (ref 8.4–10.2)
CHLORIDE SERPL-SCNC: 104 MMOL/L (ref 96–108)
CO2 SERPL-SCNC: 25 MMOL/L (ref 21–32)
CREAT SERPL-MCNC: 0.76 MG/DL (ref 0.6–1.3)
EOSINOPHIL # BLD AUTO: 0 THOUSAND/ÂΜL (ref 0–0.61)
EOSINOPHIL NFR BLD AUTO: 0 % (ref 0–6)
ERYTHROCYTE [DISTWIDTH] IN BLOOD BY AUTOMATED COUNT: 14.1 % (ref 11.6–15.1)
GFR SERPL CREATININE-BSD FRML MDRD: 72 ML/MIN/1.73SQ M
GLUCOSE SERPL-MCNC: 127 MG/DL (ref 65–140)
HCT VFR BLD AUTO: 36.8 % (ref 34.8–46.1)
HGB BLD-MCNC: 12.1 G/DL (ref 11.5–15.4)
IMM GRANULOCYTES # BLD AUTO: 0.03 THOUSAND/UL (ref 0–0.2)
IMM GRANULOCYTES NFR BLD AUTO: 1 % (ref 0–2)
LG PLATELETS BLD QL SMEAR: PRESENT
LYMPHOCYTES # BLD AUTO: 0.37 THOUSANDS/ÂΜL (ref 0.6–4.47)
LYMPHOCYTES NFR BLD AUTO: 6 % (ref 14–44)
MCH RBC QN AUTO: 30.4 PG (ref 26.8–34.3)
MCHC RBC AUTO-ENTMCNC: 32.9 G/DL (ref 31.4–37.4)
MCV RBC AUTO: 93 FL (ref 82–98)
MONOCYTES # BLD AUTO: 0.05 THOUSAND/ÂΜL (ref 0.17–1.22)
MONOCYTES NFR BLD AUTO: 1 % (ref 4–12)
NEUTROPHILS # BLD AUTO: 5.8 THOUSANDS/ÂΜL (ref 1.85–7.62)
NEUTS SEG NFR BLD AUTO: 92 % (ref 43–75)
NRBC BLD AUTO-RTO: 0 /100 WBCS
PLATELET # BLD AUTO: 176 THOUSANDS/UL (ref 149–390)
PLATELET BLD QL SMEAR: ADEQUATE
PMV BLD AUTO: 11.4 FL (ref 8.9–12.7)
POTASSIUM SERPL-SCNC: 5.5 MMOL/L (ref 3.5–5.3)
RBC # BLD AUTO: 3.98 MILLION/UL (ref 3.81–5.12)
RBC MORPH BLD: NORMAL
SODIUM SERPL-SCNC: 137 MMOL/L (ref 135–147)
WBC # BLD AUTO: 6.26 THOUSAND/UL (ref 4.31–10.16)

## 2023-12-15 PROCEDURE — 99306 1ST NF CARE HIGH MDM 50: CPT | Performed by: STUDENT IN AN ORGANIZED HEALTH CARE EDUCATION/TRAINING PROGRAM

## 2023-12-15 PROCEDURE — 99024 POSTOP FOLLOW-UP VISIT: CPT | Performed by: NURSE PRACTITIONER

## 2023-12-15 PROCEDURE — 80048 BASIC METABOLIC PNL TOTAL CA: CPT

## 2023-12-15 PROCEDURE — NC001 PR NO CHARGE: Performed by: SURGERY

## 2023-12-15 PROCEDURE — 85025 COMPLETE CBC W/AUTO DIFF WBC: CPT

## 2023-12-15 PROCEDURE — 99024 POSTOP FOLLOW-UP VISIT: CPT | Performed by: ORTHOPAEDIC SURGERY

## 2023-12-15 RX ORDER — FLUTICASONE PROPIONATE 50 MCG
2 SPRAY, SUSPENSION (ML) NASAL DAILY
Qty: 9.9 ML | Refills: 0 | Status: SHIPPED | OUTPATIENT
Start: 2023-12-16

## 2023-12-15 RX ORDER — DOCUSATE SODIUM 100 MG/1
100 CAPSULE, LIQUID FILLED ORAL 2 TIMES DAILY
Qty: 10 CAPSULE | Refills: 0 | Status: SHIPPED | OUTPATIENT
Start: 2023-12-15

## 2023-12-15 RX ORDER — OXYCODONE HYDROCHLORIDE 5 MG/1
2.5 TABLET ORAL EVERY 4 HOURS PRN
Qty: 15 TABLET | Refills: 0 | Status: ON HOLD | OUTPATIENT
Start: 2023-12-15 | End: 2023-12-22 | Stop reason: ALTCHOICE

## 2023-12-15 RX ADMIN — AMIODARONE HYDROCHLORIDE 100 MG: 200 TABLET ORAL at 09:30

## 2023-12-15 RX ADMIN — ENOXAPARIN SODIUM 30 MG: 30 INJECTION SUBCUTANEOUS at 09:30

## 2023-12-15 RX ADMIN — DOCUSATE SODIUM 100 MG: 100 CAPSULE, LIQUID FILLED ORAL at 09:30

## 2023-12-15 RX ADMIN — FLUTICASONE PROPIONATE 2 SPRAY: 50 SPRAY, METERED NASAL at 09:36

## 2023-12-15 RX ADMIN — SERTRALINE 25 MG: 25 TABLET, FILM COATED ORAL at 09:30

## 2023-12-15 RX ADMIN — LEVETIRACETAM 500 MG: 500 TABLET, FILM COATED ORAL at 09:30

## 2023-12-15 RX ADMIN — METOPROLOL SUCCINATE 25 MG: 25 TABLET, EXTENDED RELEASE ORAL at 09:30

## 2023-12-15 NOTE — ASSESSMENT & PLAN NOTE
Hx of fall  Recent hospital admission due to hematoma noted to be open/bleeding per  at home  patient taken to OR and underwent washout and split thickness skin graft and wound vac placement on 12/8/23  non-weightbearing with VAC per hospital records  to f/u with Surgery

## 2023-12-15 NOTE — PROGRESS NOTES
[facility]: [facility]    HISTORY AND PHYSICAL  Nursing Home Place of Service: nursing home place of service: POS 31 Skilled Care-Part A Coverage    NAME: Katty Bernard  : 1940 AGE: 79 y.o. SEX: female MRN: [id number]  DATE OF ENCOUNTER: 12/15/2023    Records Reviewed include: Hospital records    Chief Complaint/ Reason for Admission:   Right leg hematoma/wound    History of Present Illness:     Katty Bernard is a 79 y.o. female with PMH including HTN, chronic combined CHF, Afib, PAD, seizure, HLD, depression, sick sinus syndrome, cognitive impairment  Patient was hospitalized at [facility] from  to 12/15/23  For details of hospitalization, see hospital records including discharge documentation from 12/15/23  Briefly, patient hospitalized for re-evaluation of recent R leg hematoma ( had been changing dressing at home and noticed hematoma was open/bleeding, patient on Xarelto); patient taken to OR and underwent washout and split thickness skin graft and wound vac placement on 23; non-weightbearing with VAC per hospital records; to f/u with Surgery    Patient seen and examined in room  Others present: nurse Sameer Romero,  Melony Anderson  Patient laying in bed with head elevated  Appears comfortable, awake, alert, oriented to situation, able to converse appropriately  Patient polite and in good spirits, noting she feels well overall. R lateral lower leg hematoma with wound vac in place, she notes no associated pain and that the hematoma is much smaller/better than it was previously. R hand in brace, notes no associated pain since recent fracture. No notable pain anywhere. Appetite quite good, no swallowing issues, breathing fine, no fevers/chills, no abd pain/N/V/D, +chronic constipation, last BM ~2 days ago. No chest pain/palpitations. No acute cardiopulmonary, abdominal, or urinary symptoms; see ROS for more details.     No further questions or acute concerns identified. Lab Review:   12/15: BMP generally stable, K 5.5 (slightly high), CBC generally stable/non-actionable; COVID negative 12/14; last TSH WNL from 2021; last A1c 5.5 from 2019      XR right hand/wrist 12/13 "Healing fracture "  CXR 11/30 "No acute cardiopulmonary disease "    EKG 11/12/23: AV dual paced complexes, 77bpm, Qtc 538 (image not accessible)  Echo Jan 2021: EF 50, low-normal systolic function, grade 2 diastolic dysfunction      Social: Patient lives in a one floor home with  Ira Costello, no other help at home. Reports she is fairly independent of ADLs at home;  helps with housekeeping and meal prep and med management but she reports she manages the finances for them both. Does not drive recently. Never smoked. Ambulates without DME at baseline    Lives: Home, with spouse,  Social Support:   Fall in the past 12 months: ~1-2  Use of assistance Device: None    Allergies    Allergies   Allergen Reactions    Sulfa Antibiotics Anaphylaxis    Formaldehyde     Codeine Palpitations       Past Medical History  Past Medical History:   Diagnosis Date    Anal fissure     Cardiac disorder     Cognitive changes 12/23/2020    Esophageal reflux 12/15/2023    Esophagitis, reflux     Hemorrhoids     Hepatic hemangioma     Last Assessed: 1/13/2015    Herpes zoster     History of colonic polyps     Hypertension     Ischemic colitis ([address])     Lumbar herniated disc     Malignant neoplasm without specification of site ([address])     Nephrolithiasis     L.  Lithotripsy    Nontoxic single thyroid nodule     Last Assessed: 1/13/2015    Osteoarthritis     Overactive bladder     Raynaud disease     Respiratory system disease     Sjogren's disease ([address])     Spinal stenosis     PONCHO (stress urinary incontinence, female)     Uterovaginal prolapse     Grade I-II        Past Surgical History:   Procedure Laterality Date    APPENDECTOMY  1947    CARDIAC PACEMAKER PLACEMENT  01/2021    CARDIAC SURGERY      CABG    CATARACT EXTRACTION Bilateral     COLONOSCOPY  2012    Fiberoptic    COLONOSCOPY      Resolved: 2006 - 2012 5 year f/u    CORONARY ANGIOPLASTY WITH STENT PLACEMENT      CORONARY ARTERY BYPASS GRAFT      Resolved: 2012    ESOPHAGOGASTRODUODENOSCOPY  2012    Diagnostic    HEMORROIDECTOMY      KNEE SURGERY      LITHOTRIPSY      Renal    MALIGNANT SKIN LESION EXCISION      Face; Resolved: 2004    AK ESOPHAGOGASTRODUODENOSCOPY TRANSORAL DIAGNOSTIC N/A 4/13/2016    Procedure: EGD AND COLONOSCOPY;  Surgeon: Lina Mendez MD;  [facility]: [facility];   Service: Gastroenterology    RENAL ARTERY STENT      SKIN LESION EXCISION      Scalp    SOFT TISSUE TUMOR RESECTION      Shoulder; Resolved: 1995    SPLIT THICKNESS SKIN GRAFT Right 12/14/2023    Procedure: SKIN GRAFT SPLIT THICKNESS (STSG)  EXTREMITY; VAC application;  Surgeon: Ramesh Albarran DO;  Location: [facility];  Service: General    THROMBOLYSIS      Postoperative Thrombolysis PTCA    TONSILLECTOMY      Resolved: [address] Right 12/8/2023    Procedure: DEBRIDEMENT LOWER EXTREMITY ([address]); POSSIBLE VAC APPLICATION;  Surgeon: Gerardo Booth MD;  Location: [facility];  Service: General       Family History  Family History   Problem Relation Age of Onset    Heart disease Mother     Hypertension Mother     Osteoporosis Mother     Heart disease Father     Hypertension Father     No Known Problems Sister     Heart disease Brother     Diabetes Brother     No Known Problems Daughter     No Known Problems Son     No Known Problems Maternal Grandmother     No Known Problems Maternal Grandfather     No Known Problems Paternal Grandmother     No Known Problems Paternal Grandfather     Ulcerative colitis Family     Colon polyps Family     Breast cancer Neg Hx     Colon cancer Neg Hx     Ovarian cancer Neg Hx        Social History  Social History     Tobacco Use   Smoking Status Never   Smokeless Tobacco Never      Social History Substance and Sexual Activity   Alcohol Use Yes    Alcohol/week: 1.0 standard drink of alcohol    Types: 1 Glasses of wine per week    Comment: occasionally, but no alcohol intake recently      Social History     Substance and Sexual Activity   Drug Use Never            Physical Exam    Vital Signs  There were no vitals filed for this visit. BP: 119/58 mmHg  12/15/2023 14:26 Temp:98.1 °F  12/15/2023 14:26 Pulse:79 bpm  12/15/2023 14:26 Weight:132 Lbs  12/15/2023 14:26   Resp:19 Breaths/min  12/15/2023 14:26 BS: O2:92 %  12/15/2023 14:26 Pain:         Physical Exam  Vitals reviewed. Constitutional:       General: She is not in acute distress. Appearance: She is not toxic-appearing or diaphoretic. HENT:      Head: Normocephalic and atraumatic. Right Ear: External ear normal.      Left Ear: External ear normal.      Nose: Nose normal. No rhinorrhea. Mouth/Throat:      Mouth: Mucous membranes are moist.      Pharynx: Oropharynx is clear. No posterior oropharyngeal erythema. Eyes:      General: No scleral icterus. Right eye: No discharge. Left eye: No discharge. Extraocular Movements: Extraocular movements intact. Conjunctiva/sclera: Conjunctivae normal.      Pupils: Pupils are equal, round, and reactive to light. Cardiovascular:      Rate and Rhythm: Normal rate and regular rhythm. Pulmonary:      Effort: Pulmonary effort is normal. No respiratory distress. Breath sounds: Normal breath sounds. No wheezing. Abdominal:      General: Bowel sounds are normal.      Palpations: Abdomen is soft. Tenderness: There is no abdominal tenderness. There is no guarding. Musculoskeletal:         General: No swelling. Cervical back: No rigidity. Right lower leg: No edema. Left lower leg: No edema.       Comments: No notable bilateral peripheral edema  R lateral shin with ~2 inch wound with wound vac in place (no significant drainage)  R hand in brace, able to move/flex fingers   Skin:     General: Skin is warm and dry. Coloration: Skin is not jaundiced. Neurological:      General: No focal deficit present. Mental Status: She is alert and oriented to person, place, and time. Mental status is at baseline. Psychiatric:         Mood and Affect: Mood normal.         Behavior: Behavior normal.         Thought Content: Thought content normal.         Judgment: Judgment normal.         Review of Systems:  Review of Systems   Constitutional:  Negative for appetite change, chills, diaphoresis, fatigue and fever. HENT:  Negative for drooling, ear pain, hearing loss, rhinorrhea, sore throat and trouble swallowing. Eyes:  Negative for pain, discharge, redness, itching and visual disturbance. Respiratory:  Negative for cough, choking, chest tightness, shortness of breath and wheezing. Cardiovascular:  Negative for chest pain, palpitations and leg swelling. Gastrointestinal:  Positive for constipation. Negative for abdominal pain, blood in stool, diarrhea, nausea and vomiting. Genitourinary:  Negative for difficulty urinating, dysuria, hematuria and urgency. Musculoskeletal:  Positive for gait problem. Negative for arthralgias, back pain and neck pain. Skin:  Positive for wound. Negative for color change. Neurological:  Negative for dizziness, syncope, facial asymmetry, speech difficulty, weakness and headaches. Psychiatric/Behavioral:  Negative for agitation, behavioral problems and confusion. The patient is not nervous/anxious and is not hyperactive. All other systems reviewed and are negative.       List of Current Medications:  Current Outpatient Medications   Medication Instructions    acetaminophen (TYLENOL) 650 mg, Oral, Every 4 hours PRN    amiodarone 200 mg tablet TAKE 1/2 TABLET (100 MG TOTAL) BY MOUTH DAILY WITH BREAKFAST    atorvastatin (LIPITOR) 20 mg tablet TAKE 1 TABLET BY MOUTH EVERY DAY WITH DINNER    benzonatate (TESSALON PERLES) 100 mg, Oral, 3 times daily PRN    docusate sodium (COLACE) 100 mg, Oral, 2 times daily    [START ON 12/16/2023] fluticasone (FLONASE) 50 mcg/act nasal spray 2 sprays, Nasal, Daily    hydrOXYzine HCL (ATARAX) 25 mg, Oral, Every 6 hours PRN    ipratropium (ATROVENT) 0.03 % nasal spray 2 sprays, Nasal, Every 12 hours    metoprolol succinate (TOPROL-XL) 25 mg 24 hr tablet TAKE 1 TABLET BY MOUTH EVERY DAY    multivitamin (THERAGRAN) TABS 1 tablet, Oral, Daily    nitroglycerin (NITROSTAT) 0.4 mg, Sublingual, Every 5 minutes PRN    oxyCODONE (ROXICODONE) 2.5 mg, Oral, Every 4 hours PRN    polyethylene glycol (MIRALAX) 17 g, Oral, Daily PRN    prasugrel (EFFIENT) tablet 1 tablet daily    rivaroxaban (XARELTO) 15 mg, Oral, Daily with breakfast    senna (SENOKOT) 17.2 mg, Oral, Daily    sertraline (ZOLOFT) 25 mg tablet TAKE 1 TABLET BY MOUTH EVERY DAY         Medication reviewed. All orders signed. Complete list is in the paper chart. Allergies    Allergies   Allergen Reactions    Sulfa Antibiotics Anaphylaxis    Formaldehyde     Codeine Palpitations       Labs/Diagnostics (reviewed by this provider): I personally reviewed lab results and imaging studies. Full reports are in the paper chart.      Assessment/Plan:    Esophageal reflux  -stable  -monitor without PPI  -recommend OOB with meals, sit upright for at least 30 minutes afterwards, avoid trigger foods  -continue to monitor  -follow up with PCP, GI as appropriate      A-fib (HCC)  Chronic hx of atrial fibrillation  On Xarelto and prasugrel outpatient, held in hospital and per records recommended to continue holding pending outpatient surgery clearance  Continue clot prophylaxis with lovenox in hospital/rehab  Continue regimen including amiodarone, metoprolol with holding parameters  No acute cardiac complaints  Follow up with PCP, Cardiology    CAD (coronary artery disease)  No acute cardiac complaints  Continue statin  Home Xarelto and prasugrel held in recent hospitalization due to hematoma, per records recommended to hold pending outpatient clearance  Follow up with PCP, Cardiology    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[address])  Wt Readings from Last 3 Encounters:   12/07/23 59 kg (130 lb)   11/30/23 61.2 kg (135 lb)   11/30/23 61.4 kg (135 lb 6.4 oz)     Monitor daily weight  Monitor volume status clinically  No apparent or recent exacerbation  Follow up with PCP, Cardiology      Essential hypertension  See plan above  Monitor BP  Avoid hypotension    PAD (peripheral artery disease) ([address])  Continue statin  Xarelto and prasugrel being held as above pending outpatient/surgery clearance  Follow up with PCP, Vascular as appropriate    Sick sinus syndrome ([address])  S/p dual chamber pacemaker in 2021 per records  Follow up with Cardiology    Closed fracture of fifth metacarpal bone of right hand  Noted in recent hospitalization Nov 2023 s/p fall  Evaluated by Orthopedics with non-operative management recommended  As per recent Orthopedic recommendations "WBAT with TKO wrist brace on when out and about  At home please take off the wrist brace and continue finger range of motion to prevent stiffness"  Pain control as appropriate  PT/OT  Follow up with Orthopedic/hand surgery outpatient    Depression  Mood stable  Continue sertraline  Supportive care  Follow up with PCP, therapy/psych services outpatient as appropriate    Fall  Recent hospitalization s/p mechanical fall Nov 2023  -PT/OT  -fall precautions  -encourage appropriate DME use  -SW to follow for safe discharge planning/homecare services as appropriate      Hyperlipidemia  Continue statin    Leg hematoma, right, subsequent encounter  Hx of fall  Recent hospital admission due to hematoma noted to be open/bleeding per  at home  patient taken to OR and underwent washout and split thickness skin graft and wound vac placement on 12/8/23  non-weightbearing with VAC per hospital records  to f/u with Surgery    History of seizure  Continue keppra  Follow up with PCP, Neuro as appropriate    At risk for constipation  At risk due to hospitalization, relative immobility, comorbidities  Monitor stool output  Bowel regimen at rehab: miralax and senna as appropriate; consider bisacodyl suppository PRN if no BM in 2-3 days  Encourage mobility as tolerated, PO hydration as appropriate, high fiber diet/prune juice in outpatient setting  Goal is for 1 easy BM every 1-2 days      At risk for delirium  Delirium precautions  -Patient is high risk of delirium due to age, comorbidities, hospitalization/unfamiliar environment  -delirium precautions  -maintain normal sleep/wake cycle  -minimize overnight interruptions, group overnight vitals/labs/nursing checks as possible  -dim lights, close blinds and turn off tv to minimize stimulation and encourage sleep environment in evenings  -ensure that pain is well controlled  -monitor for fecal and urinary retention which may precipitate delirium  -encourage early mobilization and ambulation  -provide frequent reorientation and redirection  -encourage family and friends at the bedside to help help calm patient if anxious  -avoid medications which may precipitate or worsen delirium such as tramadol, benzodiazepine, anticholinergics, and benadryl  -encourage hydration and nutrition   -redirect unwanted behaviors as first line, avoid physical restraints, use chemical restraint only if all other attempts have been unsuccessful    Advance care planning  HCP and code discussion as below    Cognitive impairment  Documented dx of MCI  MoCA recorded as 19/30 on 6/14/21, 18/30 on 10/18/21  Reported to be at baseline mentation as of discharge from hospital (reportedly Aox2 at baseline)  At rehab noted to be Aox3 and reasonable historian, can be forgetful of details at times, seems to be at her baseline - given exam findings and baseline functional status at home seems consistent with likely MCI  Continue to monitor  Supportive care  Follow up with PCP, Neurology as appropriate, consider further routine outpatient memory testing once stable in outpatient setting  - to follow for safe discharge planning/homecare services as appropriate      Hyperkalemia  Mild/intermittent on recent labs  No clear etiology, not on potassium sparing diuretic or potassium supplementation; possibly hemolyzed blood sample  No apparent acute/associated symptoms  Monitor on routine labs  Consider further workup if persistent/worsening       Pain: none notable  Rehab Potential:Good  Patient Informed of Medical Condition: yes   Patient is Capable of Understanding Their Right: yes   Discharge Plan: home  Vaccination:   Immunization History   Administered Date(s) Administered    COVID-19 MODERNA VACC 0.5 ML IM 03/30/2021, 04/27/2021, 11/02/2021, 04/06/2022    COVID-19 Moderna Vac BIVALENT 12 Yr+ IM 0.5 ML 02/25/2023    INFLUENZA 10/18/2022    Influenza Split High Dose Preservative Free IM 10/20/2014, 10/26/2016    Influenza, high dose seasonal 0.7 mL 01/04/2019, 01/27/2020, 10/22/2020, 10/08/2021, 10/18/2022, 11/12/2023    Pneumococcal Conjugate 13-Valent 06/21/2017    Pneumococcal Polysaccharide PPV23 12/07/2006    Tdap 10/04/2022    Tuberculin Skin Test-PPD Intradermal 02/15/2021       DVT ppx: continue lovenox at rehab, can try teaching for use on discharge if patient able/willing. Normally she is on Xarelto/prasugrel, held in hospital and per hospital records to continue holding pending clearance from surgery outpatient.     Advanced Directives: patient verbalizes HCP is  Teodora Niece  Code status:Full Code Extensive discussion/education with patient today regarding their wishes with respect to resuscitative measures; discussed potential risks vs benefits of resuscitative measures; patient verbalizes understanding, appears to have capacity to make this decision presently, and clearly verbalizes a desire for Full Code, OK with trial of resuscitative measures (but would not want to subsist in vegetative state via artificial means if no reasonable hope of improvement)  PCP: Magaly Rivera      -Total time spent on this encounter today including documentation and workup review, face to face time, history and exam, and documentation/orders was approximately 60 minutes. -This note will be copied to [facility] EMR where vitals and medication orders are placed. Milton Bundy D.O.   Geriatric Medicine  12/15/2023 4:55 PM

## 2023-12-15 NOTE — ASSESSMENT & PLAN NOTE
Wt Readings from Last 3 Encounters:   12/07/23 59 kg (130 lb)   11/30/23 61.2 kg (135 lb)   11/30/23 61.4 kg (135 lb 6.4 oz)     Monitor daily weight  Monitor volume status clinically  No apparent or recent exacerbation  Follow up with PCP, Cardiology

## 2023-12-15 NOTE — ANESTHESIA POSTPROCEDURE EVALUATION
Post-Op Assessment Note    CV Status:  Stable  Pain Score: 8    Pain management: inadequate    Multimodal analgesia used between 6 hours prior to anesthesia start to PACU discharge    Mental Status:  Alert and awake   Hydration Status:  Euvolemic   PONV Controlled:  Controlled   Airway Patency:  Patent     Post Op Vitals Reviewed: Yes      Staff: Anesthesiologist, CRNA     Reason for prolonged intubation > 24 hours:  N/aReason for prolonged intubation > 48 hours:  N/a    Patient assessed following anesthesia with acute AMS and confusion. Following recovery from anesthesia the patient returned to reported baseline AOx2 with some situational confusion. Neurologic exam performed on patient that showed no focal neurologic deficits and vitals stable. Surgical team called to assess patient with similar findings. Patient family member, daughter, brought to bedside and agreed that this was very similar to her mother's baseline neuro status.        BP   146/109   Temp 97.3   Pulse 72   Resp 18   SpO2 96%

## 2023-12-15 NOTE — PROGRESS NOTES
Progress Note - Orthopedics   Verito Fallon 83 y.o. female MRN: [id number]  Unit/Bed#: [facility] 629-01      Subjective:    83 y.o.female status post 4 weeks after visit with Dr. Bejarano in clinic for a closed nondisplaced fracture of fifth metacarpal of right hand.  Initial plan was for her to be treated nonoperatively with a TKO brace.  She was initially instructed to follow-up in 2 weeks for repeat x-rays but she was lost to follow-up.  She now presents here at [facility] for unrelated right leg hematoma.  She has been using the TKO wrist brace intermittently.    Labs:  0   Lab Value Date/Time    HCT 36.8 12/15/2023 0445    HCT 36.4 12/13/2023 0505    HCT 40.3 12/11/2023 0625    HCT 37.7 12/24/2015 0652    HCT 35.3 12/22/2015 0426    HCT 39.0 12/21/2015 0123    HGB 12.1 12/15/2023 0445    HGB 11.7 12/13/2023 0505    HGB 12.6 12/11/2023 0625    HGB 12.1 12/24/2015 0652    HGB 11.7 12/22/2015 2006    HGB 11.3 (L) 12/22/2015 0426    INR 1.39 (H) 11/12/2023 1150    INR 0.99 12/21/2015 0123    WBC 6.26 12/15/2023 0445    WBC 5.08 12/13/2023 0505    WBC 8.13 12/11/2023 0625    WBC 4.91 12/24/2015 0652    WBC 5.22 12/22/2015 0426    WBC 7.84 12/21/2015 0123       Meds:    Current Facility-Administered Medications:     acetaminophen (TYLENOL) tablet 650 mg, 650 mg, Oral, Q4H PRN, Ashish Willams MD, 650 mg at 12/08/23 1735    amiodarone tablet 100 mg, 100 mg, Oral, Daily With Breakfast, Ashish Willams MD, 100 mg at 12/15/23 0930    atorvastatin (LIPITOR) tablet 20 mg, 20 mg, Oral, After Dinner, Ashish Willams MD, 20 mg at 12/13/23 1836    benzonatate (TESSALON PERLES) capsule 100 mg, 100 mg, Oral, TID PRN, Ashish Willams MD, 100 mg at 12/07/23 1920    docusate sodium (COLACE) capsule 100 mg, 100 mg, Oral, BID, Ashish Willams MD, 100 mg at 12/15/23 0930    enoxaparin (LOVENOX) subcutaneous injection 30 mg, 30 mg, Subcutaneous, Q12H SHELLEY, Ashish Willams MD, 30 mg at 12/15/23 0930    fluticasone  "(FLONASE) 50 mcg/act nasal spray 2 spray, 2 spray, Each Nare, Daily, Ashish Willams MD, 2 spray at 12/15/23 0936    hydrOXYzine HCL (ATARAX) tablet 25 mg, 25 mg, Oral, Q6H PRN, Ashish Willams MD, 25 mg at 12/12/23 2216    levETIRAcetam (KEPPRA) tablet 500 mg, 500 mg, Oral, Q12H, Ashish Willams MD, 500 mg at 12/15/23 0930    metoprolol succinate (TOPROL-XL) 24 hr tablet 25 mg, 25 mg, Oral, Daily, Ashish Willams MD, 25 mg at 12/15/23 0930    ondansetron (ZOFRAN) injection 4 mg, 4 mg, Intravenous, Q6H PRN, Ashish Willams MD, 4 mg at 12/08/23 1411    oxyCODONE (ROXICODONE) IR tablet 5 mg, 5 mg, Oral, Q4H PRN, Ashish Willams MD, 5 mg at 12/10/23 1524    oxyCODONE (ROXICODONE) split tablet 2.5 mg, 2.5 mg, Oral, Q4H PRN, Ashish Willams MD    senna (SENOKOT) tablet 8.6 mg, 1 tablet, Oral, HS, Ashish Willams MD, 8.6 mg at 12/13/23 2147    sertraline (ZOLOFT) tablet 25 mg, 25 mg, Oral, Daily, Ashish Willams MD, 25 mg at 12/15/23 0930    Blood Culture:   Lab Results   Component Value Date    BLOODCX No Growth After 5 Days. 01/30/2021       Wound Culture:   No results found for: \"WOUNDCULT\"    Ins and Outs:  I/O last 24 hours:  In: 760 [P.O.:60; I.V.:700]  Out: 20 [Drains:20]          Physical:  Vitals:    12/15/23 0736   BP: 137/56   Pulse: 74   Resp: 16   Temp: 97.9 °F (36.6 °C)   SpO2: 93%     Musculoskeletal: right Upper Extremity  Skin over volar base of fifth finger shows a well-healed transverse lac. No erythema or ecchymosis.  No swelling of fifth digit  No dressing required, TKO brace in place  Very minimally tender to palpation over fifth metacarpal  Sensation intact to small finger  Able to make a fist, and extend all of her fingers  Able to flex and extend at the wrist  2+ radial pulse  Digits warm and well perfused  Capillary refill < 2 seconds    Assessment:    83 y.o.female 4 weeks status post clinic visit with Dr. Bejarano for a nondisplaced fracture of fifth metacarpal of " right hand.  2-week x-rays show no new fractures and a appropriately healing fracture.  patient has TKO brace, using intermittently.No acute intervention at this time, patient to follow-up with orthopedic in 2 weeks for further evaluation in clinic    Plan:  WBAT with TKO wrist brace on when out and about  At home please take off the wrist brace and continue finger range of motion to prevent stiffness  PT/OT outpatient  Follow-up with your choice of orthopedic hand surgeon in 2 weeks  Dispo: Ok for discharge from ortho perspective    Mushtaq Celis MD

## 2023-12-15 NOTE — CASE MANAGEMENT
Case Management Discharge Planning Note    Patient name Verito Fallon  Location [facility] 629/[facility] 629-01 MRN [id number]  : 1940 Date 12/15/2023       Current Admission Date: 2023  Current Admission Diagnosis:Leg hematoma, right, subsequent encounter   Patient Active Problem List    Diagnosis Date Noted    Leg hematoma, right, subsequent encounter 2023    Closed nondisplaced fracture of fifth metacarpal bone of right hand, unspecified portion of metacarpal, initial encounter 2023    Closed fracture of fifth metacarpal bone of right hand 2023    Multiple contusions 2023    Pruritic rash 2023    Wound of right leg 2022    Fall 10/04/2022    Cervical spondylosis     Cervical disc disorder with radiculopathy of mid-cervical region     Acute (reversible) ischemia of small intestine, extent unspecified ([facility]) 2022    Atherosclerosis with claudication of extremity ([facility]) 2021    Carotid stenosis, asymptomatic, bilateral 2021    Mild cognitive impairment 2021    Memory loss 2021    Current use of long term anticoagulation 2021    Long term current use of amiodarone 2021    Renal artery stenosis ([facility]) 2021    Pulmonary hypertension ([facility]) 2021    Sick sinus syndrome ([facility]) 2021    Hx of CABG 2021    CAD (coronary artery disease) 2021    Depression 2021    Seizure-like activity ([facility]) 2021    Hyperlipidemia 2021    Toxic metabolic encephalopathy 2021    Edema of left upper extremity 2021    Urinary retention 2021    A-fib (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  [address]  [place] [address]  Phone: [phone] Fax: [phone]    Primary Care Provider: Robbie Warner DO    Primary Insurance: MEDICARE  Secondary Insurance: AETNA    DISCHARGE DETAILS:    Pt cleared for d/c today to  TCF. @1400   CM left voicemail for pt's family member Rosita.

## 2023-12-15 NOTE — DISCHARGE SUMMARY
"  Discharge Summary - Verito Fallon 83 y.o. female MRN: [id number]    [facility] Encounter: [id number]    Admission Date:   Admission Orders (From admission, onward)       Ordered        12/08/23 1404  Inpatient Admission  Once            12/07/23 1633  Place in Observation  Once                            Admitting Diagnosis: Visit for wound check [Z51.89]  Leg hematoma, right, subsequent encounter [S80.11XD]    HPI: per MARIELY CHAVEZ: \" Verito Fallon is a 83 y.o. female with history of atrial fibrillation that she takes Xarelto for, presenting today for reevaluation of a right lower leg hematoma.   states that he was changing the dressing this morning, when he noticed that the hematoma was open and there was a small amount of blood--he was advised to seek medical evaluation if wound open.  Patient is otherwise asymptomatic no new or worsening pain.  No fevers, chills, sweats, wound swelling or erythema--infective type symptoms.  No new trauma.  Patient was seen in trauma clinic and transferred to ED for direct admission for surgical evaluation and possible debridement.  Last PO intake was noon.\"    Procedures Performed:   Orders Placed This Encounter   Procedures    Midline Insertion       Summary of Hospital Course: 84 y/o female presented with a right lower leg hematoma.  Is on Xaraleto at home, history of A-fib.Seen in clinic and transferred to ED for care.  Was admitted and taken to OR for debridement.  She received a STSG last evening with a VAC in place.  REady for discharge to rehab, remains NWB with vac.  Discussed plan with Grand-daughter and answered questions.  They are in favor of her going to rehab today.  Will follow up with Surgery on Thursday the 21st of December for VAC removal.  Will also follow up with PCP.  For more details please refer to medical records.    Significant Findings, Care, Treatment and Services Provided: No results found.      Complications: " none    Discharge Diagnosis: RLE wound  S/P Washout and STSG with vac    Medical Problems       Resolved Problems  Date Reviewed: 12/7/2023   None         Condition at Discharge: stable         Discharge instructions/Information to patient and family:   See after visit summary for information provided to patient and family.      Provisions for Follow-Up Care:  See after visit summary for information related to follow-up care and any pertinent home health orders.      PCP: Robbie Warner DO    Disposition:  TCU    Planned Readmission: No      Discharge Statement   I spent 35 minutes discharging the patient. This time was spent on the day of discharge. I had direct contact with the patient on the day of discharge. Additional documentation is required if more than 30 minutes were spent on discharge.     Discharge Medications:  See after visit summary for reconciled discharge medications provided to patient and family.

## 2023-12-15 NOTE — ASSESSMENT & PLAN NOTE
Noted in recent hospitalization Nov 2023 s/p fall  Evaluated by Orthopedics with non-operative management recommended  As per recent Orthopedic recommendations "WBAT with TKO wrist brace on when out and about  At home please take off the wrist brace and continue finger range of motion to prevent stiffness"  Pain control as appropriate  PT/OT  Follow up with Orthopedic/hand surgery outpatient

## 2023-12-15 NOTE — ASSESSMENT & PLAN NOTE
Mild/intermittent on recent labs  No clear etiology, not on potassium sparing diuretic or potassium supplementation; possibly hemolyzed blood sample  No apparent acute/associated symptoms  Monitor on routine labs  Consider further workup if persistent/worsening

## 2023-12-15 NOTE — CASE MANAGEMENT
Case Management Discharge Planning Note    Patient name Verito Fallon  Location [facility] 629/[facility] 629-01 MRN [id number]  : 1940 Date 12/15/2023       Current Admission Date: 2023  Current Admission Diagnosis:Leg hematoma, right, subsequent encounter   Patient Active Problem List    Diagnosis Date Noted    Leg hematoma, right, subsequent encounter 2023    Closed nondisplaced fracture of fifth metacarpal bone of right hand, unspecified portion of metacarpal, initial encounter 2023    Closed fracture of fifth metacarpal bone of right hand 2023    Multiple contusions 2023    Pruritic rash 2023    Wound of right leg 2022    Fall 10/04/2022    Cervical spondylosis     Cervical disc disorder with radiculopathy of mid-cervical region     Acute (reversible) ischemia of small intestine, extent unspecified ([facility]) 2022    Atherosclerosis with claudication of extremity ([facility]) 2021    Carotid stenosis, asymptomatic, bilateral 2021    Mild cognitive impairment 2021    Memory loss 2021    Current use of long term anticoagulation 2021    Long term current use of amiodarone 2021    Renal artery stenosis ([facility]) 2021    Pulmonary hypertension ([facility]) 2021    Sick sinus syndrome ([facility]) 2021    Hx of CABG 2021    CAD (coronary artery disease) 2021    Depression 2021    Seizure-like activity ([facility]) 2021    Hyperlipidemia 2021    Toxic metabolic encephalopathy 2021    Edema of left upper extremity 2021    Urinary retention 2021    A-fib (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  [address]  [place] [address]  Phone: [phone] Fax: [phone]    Primary Care Provider: Robbie Warner DO    Primary Insurance: MEDICARE  Secondary Insurance: AETNA    DISCHARGE DETAILS:    Cm spoke to pt and her  about d/c. Both in agreement.  BRIANA then received call drom pt's granddaughter stating her displeasure with the possibility of the pt discharging today @1400 to [facility].   CM discussed the IMM and if the family wished to pursue an appeal  Pt's granddaughter would like to talk with the General Surgery team first and then make a decision

## 2023-12-15 NOTE — DISCHARGE INSTR - AVS FIRST PAGE
Vac remains in place over the STSG of RLE  Non-weight bearing on RLE  Medicate as needed for pain  PT/OT  Call Surgery with any issues   [phone]  On weekends call [facility] to have someone paged [phone]

## 2023-12-15 NOTE — OP NOTE
OPERATIVE REPORT  PATIENT NAME: Verito Fallon    :  1940  MRN: [id number]  Pt Location: BE OR ROOM 08    SURGERY DATE: 2023    Surgeons and Role:     * Ap Brito DO - Primary     * Ashish Willams MD - Assisting     * Enedina Easton MD - Assisting    Preop Diagnosis:  Leg hematoma, right, subsequent encounter [S80.11XD]    Post-Op Diagnosis Codes:     * Leg hematoma, right, subsequent encounter [S80.11XD]    Procedure(s):  Right - SKIN GRAFT SPLIT THICKNESS (STSG)  EXTREMITY; VAC application    Specimen(s):  * No specimens in log *    Estimated Blood Loss:   Minimal    Drains:  * No LDAs found *    Anesthesia Type:   General    Operative Indications:  Leg hematoma, right, subsequent encounter [S80.11XD]    Operative Findings:  Anterior lateral right lower extremity wound with clean base and good granulation tissue with a dimension of 4 x 5 cm.  Split-thickness skin graft taken with a dermatome from the left intramural thigh and meshed at a 1:3 ratio.  The graft was fixed in wound bed with chromic suture, overlying Adaptic, and a wound VAC with a single black sponge.    Complications:   None    Procedure and Technique:  Patient was identified in the preoperative holding area both verbally by name and by armband.  Surgical site was marked.  He was brought to the operating room, placed supine on the operating room table.  General anesthesia was induced. He was prepped and draped in the usual sterile fashion.  A time-out was called, and all were in agreement to begin the procedure.  The base of the right lower extremity wound was curetted with a Bovie scratch pad until healthy granulation tissue was present throughout the entirety of the base.  The wound dimensions were measured at 4 x 5 cm.  Mineral oil was applied to the donor site at the left anteromedial thigh, countertraction was applied, and 3 passes with a dermatome set to 0.16 mm was used to obtain the graft specimens.  The  specimens were then meshed at a 1:3 ratio.  The donor site was then dressed with thrombin-soaked gauze to achieve hemostasis while the graft was secured. Next the specimens were placed on the wound site and spread to cover the entirety of the wound bed after which they were secured in place with 2-0 chromic stitches.  An effort was made to evacuate any blood/hematoma that had come to collect under the skin graft.  Next an Adaptic was contoured to the wound and placed overlying the graft after which a black sponge was cut to size and the wound VAC dressing was applied.  After the wound VAC was found to have no leak the donor site was dressed with Xeroform and overlying Tegaderm.  General anesthesia was then gently reversed and the patient was taken to the PACU for recovery.  At the end of the case all instrument, needle, and sponge counts were correct.    Dr. Brito was present for the entire procedure.    Patient Disposition:  PACU         SIGNATURE: Ashish Willams MD  DATE: December 14, 2023  TIME: 9:56 PM

## 2023-12-15 NOTE — PROGRESS NOTES
Progress Note  Verito Fallon 83 y.o. female MRN: [id number]  Unit/Bed#: [facility] 629-01 Encounter: [id number]    Assessment  83F with RLE hematoma s/p 12/8 RLE debridement/washout, VAC application.     Plan  - regular diet  - donor and recipient site to stay dressed for 5-7d  - lovenox DVT ppx, continue to hold home rivaroxaban, prasugrel  - dispo planning, PT/OT: II, CM following for TCF    Subjective/Objective   No acute overnight events. Pain controlled. Tolerating diet. Voiding. Was a little confused about why she has a purewick but AOx3.    VSS on RA.  /53   Pulse 70   Temp 97.9 °F (36.6 °C)   Resp 20   Ht 5' (1.524 m)   Wt 59 kg (130 lb)   SpO2 95%   BMI 25.39 kg/m²     Physical Exam:  General: NAD  HENT: NCAT  CV: nl rate  Lungs: nl wob. No resp distress.  ABD: Soft, ND, NT  Extrem: RLE graft site with wound vac in place to -125 suction, no leak, with scant ss drainage. L lateral thigh donor site dressed with xeroform and tegaderm with ss strikethrough.  Neuro: alert & oriented    VAC: 20cc ss  UOP: x3    Recent Labs     12/13/23  0505 12/15/23  0445   WBC 5.08 6.26   HGB 11.7 12.1    176   SODIUM 140 137   K 4.4 5.5*    104   CO2 27 25   BUN 17 14   CREATININE 0.85 0.76   GLUC 76 127   CALCIUM 8.6 8.8

## 2023-12-15 NOTE — QUICK NOTE
General Surgery  Progress Note   Verito Fallon 83 y.o. female MRN: [id number]  Unit/Bed#: [facility] 629-01 Encounter: [id number]    Assessment:  83F with RLE hematoma s/p  RLE debridement/washout, VAC application.  Now status post split thickness skin graft of the hematoma site with left lateral thigh donor site on .    Afebrile, VSS    Plan:  -Diet as tolerated  - Pain and nausea control as needed  - Wound VAC over skin graft site; monitor output and character  -Donor site dressed with Tegaderm and Xeroform  - Both donor and recipient site will stay dressed for 5-7 days  - Continue DVT prophylaxis Lovenox; continue to hold rivaroxaban and prasugrel  - Encourage ambulation  - Incentive spirometry    Subjective/Objective     Subjective: Patient seen and examined at bedside, in no acute distress.  Postoperative confusion and delirium is improving.  Patient's pain is well controlled. Pt denies nausea or vomiting.     Objective:     Vitals:Blood pressure 138/55, pulse 70, temperature 98.1 °F (36.7 °C), resp. rate 16, height 5' (1.524 m), weight 59 kg (130 lb), SpO2 96%, not currently breastfeeding.,Body mass index is 25.39 kg/m².     Temp (24hrs), Av °F (36.7 °C), Min:97.4 °F (36.3 °C), Max:98.9 °F (37.2 °C)  Current: Temperature: 98.1 °F (36.7 °C)      Intake/Output Summary (Last 24 hours) at 12/15/2023 0208  Last data filed at 2023 2330  Gross per 24 hour   Intake 760 ml   Output 20 ml   Net 740 ml       Invasive Devices       Peripheral Intravenous Line  Duration             Long-Dwell Peripheral IV (Midline) 23 Right Basilic <1 day              Drain  Duration             External Urinary Catheter <1 day                    Physical Exam:  General: No acute distress, alert and oriented  CV: Well perfused, regular rate and rhythm  Lungs: Normal work of breathing, no increased respiratory effort  Abdomen: Soft, non-tender, non-distended.   Extremities: Right lower extremity skin graft site with  wound VAC in place without leak, left lateral thigh donor site dressed with Xeroform and Tegaderm with minimal serosanguineous strikethrough  Skin: Warm, dry    Lab Results: Results: I have personally reviewed all pertinent laboratory/tests results  VTE Prophylaxis: Sequential compression device (Venodyne)  and Enoxaparin (Lovenox)    Ashish Willams MD  12/15/2023

## 2023-12-15 NOTE — ASSESSMENT & PLAN NOTE
Mood stable  Continue sertraline  Supportive care  Follow up with PCP, therapy/psych services outpatient as appropriate

## 2023-12-15 NOTE — ASSESSMENT & PLAN NOTE
Delirium precautions  -Patient is high risk of delirium due to age, comorbidities, hospitalization/unfamiliar environment  -delirium precautions  -maintain normal sleep/wake cycle  -minimize overnight interruptions, group overnight vitals/labs/nursing checks as possible  -dim lights, close blinds and turn off tv to minimize stimulation and encourage sleep environment in evenings  -ensure that pain is well controlled  -monitor for fecal and urinary retention which may precipitate delirium  -encourage early mobilization and ambulation  -provide frequent reorientation and redirection  -encourage family and friends at the bedside to help help calm patient if anxious  -avoid medications which may precipitate or worsen delirium such as tramadol, benzodiazepine, anticholinergics, and benadryl  -encourage hydration and nutrition   -redirect unwanted behaviors as first line, avoid physical restraints, use chemical restraint only if all other attempts have been unsuccessful

## 2023-12-15 NOTE — ASSESSMENT & PLAN NOTE
No acute cardiac complaints  Continue statin  Home Xarelto and prasugrel held in recent hospitalization due to hematoma, per records recommended to hold pending outpatient clearance  Follow up with PCP, Cardiology

## 2023-12-15 NOTE — ASSESSMENT & PLAN NOTE
At risk due to hospitalization, relative immobility, comorbidities  Monitor stool output  Bowel regimen at rehab: miralax and senna as appropriate; consider bisacodyl suppository PRN if no BM in 2-3 days  Encourage mobility as tolerated, PO hydration as appropriate, high fiber diet/prune juice in outpatient setting  Goal is for 1 easy BM every 1-2 days

## 2023-12-15 NOTE — ANESTHESIA PROCEDURE NOTES
Anesthesia Notable Event  No anethesia notable event occurred.     Date/Time: 12/14/2023 11:28 PM    Patient location during procedure: PACU  Performed by: Mallory Sampson MD  Authorized by: Mallory Sampson MD

## 2023-12-15 NOTE — QUICK NOTE
Call returned to [facility]-daughter Rosita as requested.  Reviewed plan of care and current status.  Discussed the process with STSG and a vac for 7 days.  Will obtain final reads on Hand and wrist x-rays.  At this point she is scheduled to go to [facility] at 2 pm.

## 2023-12-15 NOTE — ASSESSMENT & PLAN NOTE
Recent hospitalization s/p mechanical fall Nov 2023  -PT/OT  -fall precautions  -encourage appropriate DME use  -SW to follow for safe discharge planning/homecare services as appropriate

## 2023-12-15 NOTE — ASSESSMENT & PLAN NOTE
Documented dx of MCI  MoCA recorded as 19/30 on 6/14/21, 18/30 on 10/18/21  Reported to be at baseline mentation as of discharge from hospital (reportedly Aox2 at baseline)  At rehab noted to be Aox3 and reasonable historian, can be forgetful of details at times, seems to be at her baseline - given exam findings and baseline functional status at home seems consistent with likely MCI  Continue to monitor  Supportive care  Follow up with PCP, Neurology as appropriate, consider further routine outpatient memory testing once stable in outpatient setting  -SW to follow for safe discharge planning/homecare services as appropriate

## 2023-12-15 NOTE — ANESTHESIA PREPROCEDURE EVALUATION
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 Physical Exam    Airway    Mallampati score: III  TM Distance: >3 FB  Neck ROM: full     Dental       Cardiovascular  Cardiovascular exam normal    Pulmonary  Pulmonary exam normal     Other Findings  post-pubertal.      Anesthesia Plan  ASA Score- 3     Anesthesia Type- general with ASA Monitors. Additional Monitors:     Airway Plan: LMA. Plan Factors-Exercise tolerance (METS): <4 METS. Chart reviewed. EKG reviewed. Existing labs reviewed. Patient summary reviewed. Patient is not a current smoker. Patient did not smoke on day of surgery. Obstructive sleep apnea risk education given perioperatively. Induction-     Postoperative Plan- Plan for postoperative opioid use. Planned trial extubation    Informed Consent- Anesthetic plan and risks discussed with patient. I personally reviewed this patient with the CRNA. Discussed and agreed on the Anesthesia Plan with the CRNA. Karine Mathur

## 2023-12-15 NOTE — PLAN OF CARE
Problem: PAIN - ADULT  Goal: Verbalizes/displays adequate comfort level or baseline comfort level  Description: Interventions:  - Encourage patient to monitor pain and request assistance  - Assess pain using appropriate pain scale  - Administer analgesics based on type and severity of pain and evaluate response  - Implement non-pharmacological measures as appropriate and evaluate response  - Consider cultural and social influences on pain and pain management  - Notify physician/advanced practitioner if interventions unsuccessful or patient reports new pain  Outcome: Progressing     Problem: Prexisting or High Potential for Compromised Skin Integrity  Goal: Skin integrity is maintained or improved  Description: INTERVENTIONS:  - Identify patients at risk for skin breakdown  - Assess and monitor skin integrity  - Assess and monitor nutrition and hydration status  - Monitor labs   - Assess for incontinence   - Turn and reposition patient  - Assist with mobility/ambulation  - Relieve pressure over bony prominences  - Avoid friction and shearing  - Provide appropriate hygiene as needed including keeping skin clean and dry  - Evaluate need for skin moisturizer/barrier cream  - Collaborate with interdisciplinary team   - Patient/family teaching  - Consider wound care consult   Outcome: Progressing     Problem: Knowledge Deficit  Goal: Patient/family/caregiver demonstrates understanding of disease process, treatment plan, medications, and discharge instructions  Description: Complete learning assessment and assess knowledge base.  Interventions:  - Provide teaching at level of understanding  - Provide teaching via preferred learning methods  Outcome: Progressing

## 2023-12-15 NOTE — ASSESSMENT & PLAN NOTE
Continue statin  Xarelto and prasugrel being held as above pending outpatient/surgery clearance  Follow up with PCP, Vascular as appropriate

## 2023-12-15 NOTE — ASSESSMENT & PLAN NOTE
Chronic hx of atrial fibrillation  On Xarelto and prasugrel outpatient, held in hospital and per records recommended to continue holding pending outpatient surgery clearance  Continue clot prophylaxis with lovenox in hospital/rehab  Continue regimen including amiodarone, metoprolol with holding parameters  No acute cardiac complaints  Follow up with PCP, Cardiology

## 2023-12-15 NOTE — ASSESSMENT & PLAN NOTE
-stable  -monitor without PPI  -recommend OOB with meals, sit upright for at least 30 minutes afterwards, avoid trigger foods  -continue to monitor  -follow up with PCP, GI as appropriate

## 2023-12-15 NOTE — PLAN OF CARE
Problem: PAIN - ADULT  Goal: Verbalizes/displays adequate comfort level or baseline comfort level  Description: Interventions:  - Encourage patient to monitor pain and request assistance  - Assess pain using appropriate pain scale  - Administer analgesics based on type and severity of pain and evaluate response  - Implement non-pharmacological measures as appropriate and evaluate response  - Consider cultural and social influences on pain and pain management  - Notify physician/advanced practitioner if interventions unsuccessful or patient reports new pain  Outcome: Adequate for Discharge     Problem: INFECTION - ADULT  Goal: Absence or prevention of progression during hospitalization  Description: INTERVENTIONS:  - Assess and monitor for signs and symptoms of infection  - Monitor lab/diagnostic results  - Monitor all insertion sites, i.e. indwelling lines, tubes, and drains  - Monitor endotracheal if appropriate and nasal secretions for changes in amount and color  - [place] appropriate cooling/warming therapies per order  - Administer medications as ordered  - Instruct and encourage patient and family to use good hand hygiene technique  - Identify and instruct in appropriate isolation precautions for identified infection/condition  Outcome: Adequate for Discharge  Goal: Absence of fever/infection during neutropenic period  Description: INTERVENTIONS:  - Monitor WBC    Outcome: Adequate for Discharge     Problem: SAFETY ADULT  Goal: Patient will remain free of falls  Description: INTERVENTIONS:  - Educate patient/family on patient safety including physical limitations  - Instruct patient to call for assistance with activity   - Consult OT/PT to assist with strengthening/mobility   - Keep Call bell within reach  - Keep bed low and locked with side rails adjusted as appropriate  - Keep care items and personal belongings within reach  - Initiate and maintain comfort rounds  - Make Fall Risk Sign visible to  staff  - Offer Toileting every 2 Hours, in advance of need  - Initiate/Maintain on alarm  - Obtain necessary fall risk management equipment:   - Apply yellow socks and bracelet for high fall risk patients  - Consider moving patient to room near nurses station  Outcome: Adequate for Discharge  Goal: Maintain or return to baseline ADL function  Description: INTERVENTIONS:  -  Assess patient's ability to carry out ADLs; assess patient's baseline for ADL function and identify physical deficits which impact ability to perform ADLs (bathing, care of mouth/teeth, toileting, grooming, dressing, etc.)  - Assess/evaluate cause of self-care deficits   - Assess range of motion  - Assess patient's mobility; develop plan if impaired  - Assess patient's need for assistive devices and provide as appropriate  - Encourage maximum independence but intervene and supervise when necessary  - Involve family in performance of ADLs  - Assess for home care needs following discharge   - Consider OT consult to assist with ADL evaluation and planning for discharge  - Provide patient education as appropriate  Outcome: Adequate for Discharge  Goal: Maintains/Returns to pre admission functional level  Description: INTERVENTIONS:  - Perform AM-PAC 6 Click Basic Mobility/ Daily Activity assessment daily.  - Set and communicate daily mobility goal to care team and patient/family/caregiver.   - Collaborate with rehabilitation services on mobility goals if consulted  - Perform Range of Motion 3 times a day.  - Reposition patient every 2 hours.  - Dangle patient 3 times a day  - Stand patient 3 times a day  - Ambulate patient 3 times a day  - Out of bed to chair 3 times a day   - Out of bed for meals 3 times a day  - Out of bed for toileting  - Record patient progress and toleration of activity level   Outcome: Adequate for Discharge     Problem: DISCHARGE PLANNING  Goal: Discharge to home or other facility with appropriate resources  Description:  INTERVENTIONS:  - Identify barriers to discharge w/patient and caregiver  - Arrange for needed discharge resources and transportation as appropriate  - Identify discharge learning needs (meds, wound care, etc.)  - Arrange for interpretive services to assist at discharge as needed  - Refer to Case Management Department for coordinating discharge planning if the patient needs post-hospital services based on physician/advanced practitioner order or complex needs related to functional status, cognitive ability, or social support system  Outcome: Adequate for Discharge     Problem: Knowledge Deficit  Goal: Patient/family/caregiver demonstrates understanding of disease process, treatment plan, medications, and discharge instructions  Description: Complete learning assessment and assess knowledge base.  Interventions:  - Provide teaching at level of understanding  - Provide teaching via preferred learning methods  Outcome: Adequate for Discharge     Problem: PHYSICAL THERAPY ADULT  Goal: Performs mobility at highest level of function for planned discharge setting.  See evaluation for individualized goals.  Description: Treatment/Interventions: Functional transfer training, LE strengthening/ROM, Elevations, Therapeutic exercise, Endurance training, Patient/family training, Equipment eval/education, Bed mobility, Gait training, Spoke to nursing, OT          See flowsheet documentation for full assessment, interventions and recommendations.  Outcome: Adequate for Discharge     Problem: OCCUPATIONAL THERAPY ADULT  Goal: Performs self-care activities at highest level of function for planned discharge setting.  See evaluation for individualized goals.  Description: Treatment Interventions: ADL retraining, Functional transfer training, Endurance training, Patient/family training, Equipment evaluation/education, Compensatory technique education, Activityengagement          See flowsheet documentation for full assessment,  interventions and recommendations.   Outcome: Adequate for Discharge     Problem: Prexisting or High Potential for Compromised Skin Integrity  Goal: Skin integrity is maintained or improved  Description: INTERVENTIONS:  - Identify patients at risk for skin breakdown  - Assess and monitor skin integrity  - Assess and monitor nutrition and hydration status  - Monitor labs   - Assess for incontinence   - Turn and reposition patient  - Assist with mobility/ambulation  - Relieve pressure over bony prominences  - Avoid friction and shearing  - Provide appropriate hygiene as needed including keeping skin clean and dry  - Evaluate need for skin moisturizer/barrier cream  - Collaborate with interdisciplinary team   - Patient/family teaching  - Consider wound care consult   Outcome: Adequate for Discharge     Problem: Nutrition/Hydration-ADULT  Goal: Nutrient/Hydration intake appropriate for improving, restoring or maintaining nutritional needs  Description: Monitor and assess patient's nutrition/hydration status for malnutrition. Collaborate with interdisciplinary team and initiate plan and interventions as ordered.  Monitor patient's weight and dietary intake as ordered or per policy. Utilize nutrition screening tool and intervene as necessary. Determine patient's food preferences and provide high-protein, high-caloric foods as appropriate.     INTERVENTIONS:  - Monitor oral intake, urinary output, labs, and treatment plans  - Assess nutrition and hydration status and recommend course of action  - Evaluate amount of meals eaten  - Assist patient with eating if necessary   - Allow adequate time for meals  - Recommend/ encourage appropriate diets, oral nutritional supplements, and vitamin/mineral supplements  - Order, calculate, and assess calorie counts as needed  - Recommend, monitor, and adjust tube feedings and TPN/PPN based on assessed needs  - Assess need for intravenous fluids  - Provide specific nutrition/hydration  education as appropriate  - Include patient/family/caregiver in decisions related to nutrition  Outcome: Adequate for Discharge

## 2023-12-18 ENCOUNTER — TRANSITIONAL CARE MANAGEMENT (OUTPATIENT)
Dept: FAMILY MEDICINE CLINIC | Facility: MEDICAL CENTER | Age: 83
End: 2023-12-18

## 2023-12-19 ENCOUNTER — PATIENT OUTREACH (OUTPATIENT)
Dept: CASE MANAGEMENT | Facility: OTHER | Age: 83
End: 2023-12-19

## 2023-12-19 ENCOUNTER — NURSING HOME VISIT (OUTPATIENT)
Dept: GERIATRICS | Facility: OTHER | Age: 83
End: 2023-12-19
Payer: MEDICARE

## 2023-12-19 DIAGNOSIS — Z91.89 AT RISK FOR CONSTIPATION: ICD-10-CM

## 2023-12-19 DIAGNOSIS — I48.0 PAROXYSMAL ATRIAL FIBRILLATION (HCC): ICD-10-CM

## 2023-12-19 DIAGNOSIS — S62.306D CLOSED DISPLACED FRACTURE OF FIFTH METACARPAL BONE OF RIGHT HAND WITH ROUTINE HEALING, UNSPECIFIED PORTION OF METACARPAL, SUBSEQUENT ENCOUNTER: ICD-10-CM

## 2023-12-19 DIAGNOSIS — I10 ESSENTIAL HYPERTENSION: Chronic | ICD-10-CM

## 2023-12-19 DIAGNOSIS — I50.42 CHRONIC COMBINED SYSTOLIC AND DIASTOLIC CONGESTIVE HEART FAILURE (HCC): ICD-10-CM

## 2023-12-19 DIAGNOSIS — S80.11XD LEG HEMATOMA, RIGHT, SUBSEQUENT ENCOUNTER: Primary | ICD-10-CM

## 2023-12-19 PROCEDURE — 99309 SBSQ NF CARE MODERATE MDM 30: CPT | Performed by: STUDENT IN AN ORGANIZED HEALTH CARE EDUCATION/TRAINING PROGRAM

## 2023-12-19 NOTE — ASSESSMENT & PLAN NOTE
Wt Readings from Last 3 Encounters:   12/07/23 59 kg (130 lb)   11/30/23 61.2 kg (135 lb)   11/30/23 61.4 kg (135 lb 6.4 oz)         strike-out   12/18/2023 03:35 134.0 Lbs Standing jcarpenter (Manual)    strike-out   12/17/2023 06:23 137.7 Lbs Standing jcarpenter (Manual)    strike-out   12/16/2023 05:40 130.2 Lbs Bed dhoward (Manual)    strike-out   12/15/2023 14:26 132.0 Lbs Bed cferry (Manual)        Monitor daily weight  Monitor volume status clinically - appears euvolemic  No apparent or recent exacerbation  Follow up with PCP, Cardiology

## 2023-12-19 NOTE — ASSESSMENT & PLAN NOTE
At risk due to hospitalization, relative immobility, comorbidities  Monitor stool output - seems stable, had BM today and yesterday  Bowel regimen at rehab: miralax daily and senna 2 tabs qHS, adjust as appropriate; consider bisacodyl suppository PRN if no BM in 2-3 days  Encourage mobility as tolerated, PO hydration as appropriate, high fiber diet/prune juice in outpatient setting  Goal is for 1 easy BM every 1-2 days

## 2023-12-19 NOTE — PROGRESS NOTES
"[facility]  Facility: [facility]    PROGRESS NOTE  Nursing Home Place of Service: nursing home place of service: [facility] A Coverage    NAME: Verito Fallon  : 1940 AGE: 83 y.o. 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)     Wt Readings from Last 3 Encounters:   23 59 kg (130 lb)   23 61.2 kg (135 lb)   23 61.4 kg (135 lb 6.4 oz)         strike-out   2023 03:35 134.0 Lbs Standing jcarpenter (Manual)    strike-out   2023 06:23 137.7 Lbs Standing jcarpenter (Manual)    strike-out   2023 05:40 130.2 Lbs Bed dhoward (Manual)    strike-out   12/15/2023 14:26 132.0 Lbs Bed cferry (Manual)        Monitor daily weight  Monitor volume status clinically - appears euvolemic  No apparent or recent exacerbation  Follow up with PCP, Cardiology             A-fib (HCC)     Chronic hx of atrial fibrillation  On Xarelto and prasugrel outpatient, held in hospital and per records recommended to continue holding pending outpatient surgery clearance  Continue clot prophylaxis with lovenox in hospital/rehab  Continue regimen including amiodarone, metoprolol with holding parameters  No acute cardiac complaints  Follow up with PCP, Cardiology         Closed fracture of fifth metacarpal bone of right hand     Noted in recent hospitalization 2023 s/p fall  Evaluated by Orthopedics with non-operative management recommended  As per recent Orthopedic recommendations \"WBAT with TKO wrist brace on when out and about  At home please take off the wrist brace and continue finger range of motion " "to prevent stiffness\"  Pain control as appropriate - no notable associated pain  PT/OT  Follow up with Orthopedic/hand surgery outpatient         Leg hematoma, right, subsequent encounter - Primary     Hx of fall  Recent hospital admission due to hematoma noted to be open/bleeding per  at home  patient taken to OR and underwent washout and split thickness skin graft and wound vac placement on 12/8/23  non-weightbearing with VAC per hospital records  Appearing stable, no notable associated pain  As of 12/19 stop oxycodone, continue PRN tylenol for pain  to f/u with Surgery         At risk for constipation     At risk due to hospitalization, relative immobility, comorbidities  Monitor stool output - seems stable, had BM today and yesterday  Bowel regimen at rehab: miralax daily and senna 2 tabs qHS, adjust as appropriate; consider bisacodyl suppository PRN if no BM in 2-3 days  Encourage mobility as tolerated, PO hydration as appropriate, high fiber diet/prune juice in outpatient setting  Goal is for 1 easy BM every 1-2 days                Chief Complaint     Follow up wound, pain    History of Present Illness     eVrito Fallon is a 83 y.o. female who was seen today for follow up. 83 y.o. female with PMH including HTN, chronic combined CHF, Afib, PAD, seizure, HLD, depression, sick sinus syndrome, cognitive impairment     Patient seen and examined in room  Others present: none  Patient seated in chair  Appears comfortable, awake, alert, oriented to situation, able to converse appropriately  Patient polite and in good spirits, noting she feels well overall, about the same as last week, and with no new/acute complaints. R lateral lower leg hematoma with wound vac in place, she notes no associated pain and that it is not bothering her. R hand in brace, notes no associated pain since recent fracture and able to move/flex fingers. No notable pain anywhere. Appetite has been good, no swallowing issues, breathing " "fine, no fevers/chills, no abd pain/N/V/D, +chronic constipation seems better, last BM earlier today normal and nursing reported she also had one yesterday. No chest pain/palpitations. No acute cardiopulmonary, abdominal, or urinary symptoms; see ROS for more details.   No further questions or acute concerns identified.    Lab Review:   12/15: BMP generally stable, K 5.5 (slightly high), CBC generally stable/non-actionable; COVID negative 12/14; last TSH WNL from 2021; last A1c 5.5 from 2019 12/18: BMP, CBC generally stable/non-actionable        XR right hand/wrist 12/13 \"Healing fracture \"  CXR 11/30 \"No acute cardiopulmonary disease \"     EKG 11/12/23: AV dual paced complexes, 77bpm, Qtc 538 (image not accessible)  Echo Jan 2021: EF 50, low-normal systolic function, grade 2 diastolic dysfunction       The following portions of the patient's history were reviewed and updated as appropriate: allergies, current medications, past family history, past medical history, past social history, past surgical history and problem list.    Review of Systems     Review of Systems   Constitutional:  Negative for appetite change, chills, diaphoresis, fatigue and fever.   HENT:  Negative for drooling, ear pain, hearing loss, rhinorrhea, sore throat and trouble swallowing.    Eyes:  Negative for pain, discharge, redness, itching and visual disturbance.   Respiratory:  Negative for cough, choking, chest tightness, shortness of breath and wheezing.    Cardiovascular:  Negative for chest pain, palpitations and leg swelling.   Gastrointestinal:  Positive for constipation (stable/improved). Negative for abdominal pain, blood in stool, diarrhea, nausea and vomiting.   Genitourinary:  Negative for difficulty urinating, dysuria, hematuria and urgency.   Musculoskeletal:  Positive for gait problem. Negative for arthralgias, back pain and neck pain.   Skin:  Positive for wound. Negative for color change.   Neurological:  Negative for " dizziness, syncope, facial asymmetry, speech difficulty, weakness and headaches.   Psychiatric/Behavioral:  Negative for agitation, behavioral problems and confusion. The patient is not nervous/anxious and is not hyperactive.    All other systems reviewed and are negative.      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)    A-fib (HCC)    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HCC)    PAD (peripheral artery disease) (HCC)    Abnormal liver enzymes    Edema of left upper extremity    Urinary retention    Seizure-like activity (HCC)    Hyperlipidemia    Toxic metabolic encephalopathy    Depression    Current use of long term anticoagulation    Long term current use of amiodarone    Renal artery stenosis (HCC)    Pulmonary hypertension (HCC)    Sick sinus syndrome (HCC)    Hx of CABG    CAD (coronary artery disease)    Memory loss    Cognitive impairment    Atherosclerosis with claudication of extremity (HCC)    Carotid stenosis, asymptomatic, bilateral    Acute (reversible) ischemia of small intestine, extent unspecified (HCC)    Cervical disc disorder with radiculopathy of mid-cervical region    Cervical spondylosis    Fall    Wound of right leg    Pruritic rash    Multiple contusions    Closed fracture of fifth metacarpal bone of right hand    Closed nondisplaced fracture of fifth metacarpal bone of right hand, unspecified portion of metacarpal, initial encounter    Leg hematoma, right, subsequent encounter    Esophageal reflux    History of seizure    At risk for constipation    At risk for delirium    Advance care planning    Hyperkalemia       Objective     Vital Signs:     BP: 130/71 mmHg  12/19/2023 07:42   Temp:96.8 °F  12/19/2023 07:42 Pulse:72 bpm  12/19/2023 07:42 Weight:134 Lbs  12/18/2023 03:35   Resp:20 Breaths/min  12/19/2023 07:42 BS: O2:96 %  12/19/2023 07:42 Pain:0  12/19/2023 02:54       Physical  Exam  Vitals reviewed.   Constitutional:       General: She is not in acute distress.     Appearance: She is not toxic-appearing or diaphoretic.   HENT:      Head: Normocephalic and atraumatic.      Right Ear: External ear normal.      Left Ear: External ear normal.      Nose: Nose normal. No rhinorrhea.      Mouth/Throat:      Mouth: Mucous membranes are moist.      Pharynx: Oropharynx is clear. No posterior oropharyngeal erythema.   Eyes:      General: No scleral icterus.        Right eye: No discharge.         Left eye: No discharge.      Extraocular Movements: Extraocular movements intact.      Conjunctiva/sclera: Conjunctivae normal.      Pupils: Pupils are equal, round, and reactive to light.   Cardiovascular:      Rate and Rhythm: Normal rate and regular rhythm.   Pulmonary:      Effort: Pulmonary effort is normal. No respiratory distress.      Breath sounds: Normal breath sounds. No wheezing.   Abdominal:      General: Bowel sounds are normal.      Palpations: Abdomen is soft.      Tenderness: There is no abdominal tenderness. There is no guarding.   Musculoskeletal:         General: No swelling.      Cervical back: No rigidity.      Right lower leg: No edema.      Left lower leg: No edema.      Comments: No notable bilateral peripheral edema  R lateral shin with ~2 inch wound with wound vac in place (no significant drainage)  R hand in brace, able to move/flex fingers   Skin:     General: Skin is warm and dry.      Coloration: Skin is not jaundiced.   Neurological:      General: No focal deficit present.      Mental Status: She is alert and oriented to person, place, and time. Mental status is at baseline.   Psychiatric:         Mood and Affect: Mood normal.         Behavior: Behavior normal.         Thought Content: Thought content normal.         Judgment: Judgment normal.         Pertinent Laboratory/Diagnostic Studies:  Laboratory and Imaging studies reviewed. Full report in the paper chart.     Current  Medications   Medications reviewed and updated in facility chart.      -Total time spent on this encounter today including documentation and workup review, face to face time, history and exam, and documentation/orders was approximately 30 minutes.  -This note will be copied to [facility] EMR where vitals and medication orders are placed.    Ayo Leija D.O.  Geriatric Medicine  12/19/2023 12:26 PM

## 2023-12-19 NOTE — PROGRESS NOTES
Outpatient Care Management [facility]. Discharged 12/15/23 to [facility]. This Admin Coordinator will continue to monitor via chart review.

## 2023-12-19 NOTE — ASSESSMENT & PLAN NOTE
Hx of fall  Recent hospital admission due to hematoma noted to be open/bleeding per  at home  patient taken to OR and underwent washout and split thickness skin graft and wound vac placement on 12/8/23  non-weightbearing with VAC per hospital records  Appearing stable, no notable associated pain  As of 12/19 stop oxycodone, continue PRN tylenol for pain  to f/u with Surgery

## 2023-12-19 NOTE — ASSESSMENT & PLAN NOTE
"Noted in recent hospitalization Nov 2023 s/p fall  Evaluated by Orthopedics with non-operative management recommended  As per recent Orthopedic recommendations \"WBAT with TKO wrist brace on when out and about  At home please take off the wrist brace and continue finger range of motion to prevent stiffness\"  Pain control as appropriate - no notable associated pain  PT/OT  Follow up with Orthopedic/hand surgery outpatient  "

## 2023-12-19 NOTE — ASSESSMENT & PLAN NOTE
Monitor BP - generally stable/controlled in 120-140 systolic range, rarely higher  Avoid hypotension  No acute cardiac complaints  Continue regimen as above

## 2023-12-21 ENCOUNTER — PATIENT OUTREACH (OUTPATIENT)
Dept: CASE MANAGEMENT | Facility: OTHER | Age: 83
End: 2023-12-21

## 2023-12-21 NOTE — PROGRESS NOTES
Chart review complete the patient is currently admitted to [facility] for STR. This Admin Coordinator will continue to monitor via chart review.

## 2023-12-22 ENCOUNTER — NURSING HOME VISIT (OUTPATIENT)
Dept: GERIATRICS | Facility: OTHER | Age: 83
End: 2023-12-22
Payer: MEDICARE

## 2023-12-22 DIAGNOSIS — I50.40 COMBINED CONGESTIVE SYSTOLIC AND DIASTOLIC HEART FAILURE (HCC): ICD-10-CM

## 2023-12-22 DIAGNOSIS — I50.42 CHRONIC COMBINED SYSTOLIC AND DIASTOLIC CONGESTIVE HEART FAILURE (HCC): ICD-10-CM

## 2023-12-22 DIAGNOSIS — S62.306D CLOSED DISPLACED FRACTURE OF FIFTH METACARPAL BONE OF RIGHT HAND WITH ROUTINE HEALING, UNSPECIFIED PORTION OF METACARPAL, SUBSEQUENT ENCOUNTER: ICD-10-CM

## 2023-12-22 DIAGNOSIS — S80.11XD LEG HEMATOMA, RIGHT, SUBSEQUENT ENCOUNTER: Primary | ICD-10-CM

## 2023-12-22 DIAGNOSIS — I10 ESSENTIAL HYPERTENSION: Chronic | ICD-10-CM

## 2023-12-22 PROCEDURE — 99310 SBSQ NF CARE HIGH MDM 45: CPT | Performed by: STUDENT IN AN ORGANIZED HEALTH CARE EDUCATION/TRAINING PROGRAM

## 2023-12-22 RX ORDER — BUMETANIDE 2 MG/1
TABLET ORAL
Qty: 30 TABLET | Refills: 8 | Status: SHIPPED | OUTPATIENT
Start: 2023-12-22

## 2023-12-22 NOTE — PROGRESS NOTES
"[facility]  Facility: [facility]    PROGRESS NOTE  Nursing Home Place of Service: nursing home place of service: POS 31 Skilled Care-Part A Coverage    NAME: Verito Fallon  : 1940 AGE: 83 y.o. 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)     Wt Readings from Last 3 Encounters:   23 59 kg (130 lb)   23 61.2 kg (135 lb)   23 61.4 kg (135 lb 6.4 oz)         strike-out   2023 06:17 128.2 Lbs Bed dhoward (Manual)    strike-out   2023 05:12 126.2 Lbs Bed dhoward (Manual)    strike-out   2023 06:47 128.4 Lbs Bed mtrexler (Manual)    strike-out   2023 03:35 134.0 Lbs Standing jcarpenter (Manual)    strike-out   2023 06:23 137.7 Lbs Standing jcarpenter (Manual)    strike-out   2023 05:40 130.2 Lbs Bed dhoward (Manual)    strike-out   12/15/2023 14:26 132.0 Lbs Bed cferry (Manual)        Monitor daily weight - stable, not uptrending  Monitor volume status clinically - appears euvolemic  Continue home bumex 2mg daily (with hold parameters) and monitor routine labs  No apparent or recent exacerbation  Follow up with PCP, Cardiology           Closed fracture of fifth metacarpal bone of right hand     Noted in recent hospitalization 2023 s/p fall  Evaluated by Orthopedics with non-operative management recommended  As per recent Orthopedic recommendations \"WBAT with TKO wrist brace on when out and about  At home please take off the wrist brace and continue finger range of motion to prevent stiffness\"  Pain control as appropriate - no notable associated " pain  PT/OT  Follow up with Orthopedic/hand surgery outpatient         Leg hematoma, right, subsequent encounter - Primary     Hx of fall  Recent hospital admission due to hematoma noted to be open/bleeding per  at home  patient taken to OR and underwent washout and split thickness skin graft and wound vac placement on 12/8/23 (non-weightbearing with VAC per hospital records)  Case discussed with Surgery team and wound vac removed 12/22, patient now WBAT  Appearing stable, no notable associated pain  As of 12/19 stopped oxycodone, continue PRN tylenol for pain  to f/u with Surgery                      Chief Complaint     Follow up wound, pain    History of Present Illness     Verito Fallon is a 83 y.o. female who was seen today for follow up. 83 y.o. female with PMH including HTN, chronic combined CHF, Afib, PAD, seizure, HLD, depression, sick sinus syndrome, cognitive impairment     Patient was supposed to have appt with General Surgery in [place] yesterday 12/21, however while she was being prepared for transport we were informed of cancellation of appointment and request to consult Surgery locally at [place] instead for post-op/wound avc takedown. I placed surgery consult 12/21. Earlier today 12/22 I reached out to General Surgery at [place] (on-call Dr. Hurst) regarding the case, per discussion I was advised that Surgery team would not need to see patient and that nurse could remove the wound vac, no specific recommendations regarding wound care/dressing in light of her skin grafting and surgeon recommended to get in touch with her [place] surgery team for this. Subsequently I reached out to General Surgery team at [place] (on call Be Millard) regarding the case, who recommended OK for nurse to takedown the vac, asked to get a picture of wound uploaded to chart, recommended wound care orders, and confirmed OK to bear weight once VAC off.  Extensive discussion had with patient's  " Leander at nurses station subsequently to relay and discuss the above, Leander hoping patient could go home soon but per discussion agreeable to continuing with rehab for now since she will now be weight-bearing, to give her a chance to keep working with therapy to return to ambulatory status.  Patient seen and examined in room subsequently.  Others present:  Leander, nurse Nikki  Patient seated in chair  Appears comfortable, awake, alert, oriented to situation, able to converse appropriately  Patient polite and in good spirits, noting she feels well overall. and with no new/acute complaints. R lateral lower leg hematoma with wound vac in place, she notes no associated pain and that it is not bothering her. R hand in brace, notes no associated pain since recent fracture and able to move/flex fingers. No notable pain anywhere. Appetite has been very good, no swallowing issues, breathing fine, no fevers/chills, no abd pain/N/V/D, +chronic constipation seems better, last BM yesterday normal. No chest pain/palpitations. No acute cardiopulmonary, abdominal, or urinary symptoms; see ROS for more details.   Nurse and I removed the wound vac from right leg wound, patient tolerated well with no blood loss or complication, skin graft underneath appearing without necrosis or purulence, patient amenable to continued therapy.  No further questions or acute concerns identified.    Lab Review:   12/15: BMP generally stable, K 5.5 (slightly high), CBC generally stable/non-actionable; COVID negative 12/14; last TSH WNL from 2021; last A1c 5.5 from 2019 12/18: BMP, CBC generally stable/non-actionable        XR right hand/wrist 12/13 \"Healing fracture \"  CXR 11/30 \"No acute cardiopulmonary disease \"     EKG 11/12/23: AV dual paced complexes, 77bpm, Qtc 538 (image not accessible)  Echo Jan 2021: EF 50, low-normal systolic function, grade 2 diastolic dysfunction       The following portions of the patient's history were " reviewed and updated as appropriate: allergies, current medications, past family history, past medical history, past social history, past surgical history and problem list.    Review of Systems     Review of Systems   Constitutional:  Negative for appetite change, chills, diaphoresis, fatigue and fever.   HENT:  Negative for drooling, ear pain, hearing loss, rhinorrhea, sore throat and trouble swallowing.    Eyes:  Negative for pain, discharge, redness, itching and visual disturbance.   Respiratory:  Negative for cough, choking, chest tightness, shortness of breath and wheezing.    Cardiovascular:  Negative for chest pain, palpitations and leg swelling.   Gastrointestinal:  Positive for constipation (stable/improved). Negative for abdominal pain, blood in stool, diarrhea, nausea and vomiting.   Genitourinary:  Negative for difficulty urinating, dysuria, hematuria and urgency.   Musculoskeletal:  Positive for gait problem. Negative for arthralgias, back pain and neck pain.   Skin:  Positive for wound. Negative for color change.   Neurological:  Negative for dizziness, syncope, facial asymmetry, speech difficulty, weakness and headaches.   Psychiatric/Behavioral:  Negative for agitation, behavioral problems and confusion. The patient is not nervous/anxious and is not hyperactive.    All other systems reviewed and are negative.      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)    A-fib (HCC)    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HCC)    PAD (peripheral artery disease) (HCC)    Abnormal liver enzymes    Edema of left upper extremity    Urinary retention    Seizure-like activity (HCC)    Hyperlipidemia    Toxic metabolic encephalopathy    Depression    Current use of long term anticoagulation    Long term current use of amiodarone    Renal artery stenosis (HCC)    Pulmonary hypertension (HCC)    Sick sinus syndrome (HCC)     Hx of CABG    CAD (coronary artery disease)    Memory loss    Cognitive impairment    Atherosclerosis with claudication of extremity (HCC)    Carotid stenosis, asymptomatic, bilateral    Acute (reversible) ischemia of small intestine, extent unspecified (HCC)    Cervical disc disorder with radiculopathy of mid-cervical region    Cervical spondylosis    Fall    Wound of right leg    Pruritic rash    Multiple contusions    Closed fracture of fifth metacarpal bone of right hand    Closed nondisplaced fracture of fifth metacarpal bone of right hand, unspecified portion of metacarpal, initial encounter    Leg hematoma, right, subsequent encounter    Esophageal reflux    History of seizure    At risk for constipation    At risk for delirium    Advance care planning    Hyperkalemia       Objective     Vital Signs:     BP: 105/48 mmHg  12/21/2023 16:01   Temp:97 °F  12/21/2023 16:01 Pulse:74 bpm  12/21/2023 16:01   Weight:128.2 Lbs  12/22/2023 06:17   Resp:20 Breaths/min  12/21/2023 16:01 BS: O2:92 %  12/21/2023 16:01 Pain:0  12/22/2023 00:05       Physical Exam  Vitals reviewed.   Constitutional:       General: She is not in acute distress.     Appearance: She is not toxic-appearing or diaphoretic.   HENT:      Head: Normocephalic and atraumatic.      Right Ear: External ear normal.      Left Ear: External ear normal.      Nose: Nose normal. No rhinorrhea.      Mouth/Throat:      Mouth: Mucous membranes are moist.      Pharynx: Oropharynx is clear. No posterior oropharyngeal erythema.   Eyes:      General: No scleral icterus.        Right eye: No discharge.         Left eye: No discharge.      Extraocular Movements: Extraocular movements intact.      Conjunctiva/sclera: Conjunctivae normal.      Pupils: Pupils are equal, round, and reactive to light.   Cardiovascular:      Rate and Rhythm: Normal rate and regular rhythm.   Pulmonary:      Effort: Pulmonary effort is normal. No respiratory distress.      Breath sounds:  Normal breath sounds. No wheezing.   Abdominal:      General: Bowel sounds are normal.      Palpations: Abdomen is soft.      Tenderness: There is no abdominal tenderness. There is no guarding.   Musculoskeletal:         General: No swelling.      Cervical back: No rigidity.      Right lower leg: No edema.      Left lower leg: No edema.      Comments: No notable bilateral peripheral edema  R lateral shin with ~2 inch wound with wound vac removed, skin graft appearing stable/healing well, no noted associated acute bleed/drainage/purulence/necrosis  R hand in brace, able to move/flex fingers   Skin:     General: Skin is warm and dry.      Coloration: Skin is not jaundiced.   Neurological:      General: No focal deficit present.      Mental Status: She is alert and oriented to person, place, and time. Mental status is at baseline.   Psychiatric:         Mood and Affect: Mood normal.         Behavior: Behavior normal.         Thought Content: Thought content normal.         Judgment: Judgment normal.         Pertinent Laboratory/Diagnostic Studies:  Laboratory and Imaging studies reviewed. Full report in the paper chart.     Current Medications   Medications reviewed and updated in facility chart.      -Total time spent on this encounter today including documentation and workup review, face to face time, history and exam, and documentation/orders, and discussion with Surgery team and wound vac removal was approximately 45 minutes.  -This note will be copied to [facility] EMR where vitals and medication orders are placed.    Ayo Leija D.O.  Geriatric Medicine  12/22/2023 2:27 PM

## 2023-12-22 NOTE — ASSESSMENT & PLAN NOTE
Hx of fall  Recent hospital admission due to hematoma noted to be open/bleeding per  at home  patient taken to OR and underwent washout and split thickness skin graft and wound vac placement on 12/8/23 (non-weightbearing with VAC per hospital records)  Case discussed with Surgery team and wound vac removed 12/22, patient now WBAT  Appearing stable, no notable associated pain  As of 12/19 stopped oxycodone, continue PRN tylenol for pain  to f/u with Surgery

## 2023-12-22 NOTE — ASSESSMENT & PLAN NOTE
Monitor BP - generally stable/controlled in 120-140 systolic range, rarely higher or lower  Avoid hypotension  No acute cardiac complaints  Continue regimen as above

## 2023-12-22 NOTE — ASSESSMENT & PLAN NOTE
Wt Readings from Last 3 Encounters:   12/07/23 59 kg (130 lb)   11/30/23 61.2 kg (135 lb)   11/30/23 61.4 kg (135 lb 6.4 oz)         strike-out   12/22/2023 06:17 128.2 Lbs Bed dhoward (Manual)    strike-out   12/21/2023 05:12 126.2 Lbs Bed dhoward (Manual)    strike-out   12/20/2023 06:47 128.4 Lbs Bed mtrexler (Manual)    strike-out   12/18/2023 03:35 134.0 Lbs Standing jcarpenter (Manual)    strike-out   12/17/2023 06:23 137.7 Lbs Standing jcarpenter (Manual)    strike-out   12/16/2023 05:40 130.2 Lbs Bed dhoward (Manual)    strike-out   12/15/2023 14:26 132.0 Lbs Bed cferry (Manual)        Monitor daily weight - stable, not uptrending  Monitor volume status clinically - appears euvolemic  Continue home bumex 2mg daily (with hold parameters) and monitor routine labs  No apparent or recent exacerbation  Follow up with PCP, Cardiology

## 2023-12-25 ENCOUNTER — TELEPHONE (OUTPATIENT)
Dept: OTHER | Facility: HOSPITAL | Age: 83
End: 2023-12-25

## 2023-12-26 ENCOUNTER — NURSING HOME VISIT (OUTPATIENT)
Dept: GERIATRICS | Facility: OTHER | Age: 83
End: 2023-12-26
Payer: MEDICARE

## 2023-12-26 VITALS
SYSTOLIC BLOOD PRESSURE: 143 MMHG | HEART RATE: 73 BPM | DIASTOLIC BLOOD PRESSURE: 68 MMHG | OXYGEN SATURATION: 95 % | TEMPERATURE: 97.8 F

## 2023-12-26 DIAGNOSIS — I50.42 CHRONIC COMBINED SYSTOLIC AND DIASTOLIC CONGESTIVE HEART FAILURE (HCC): ICD-10-CM

## 2023-12-26 DIAGNOSIS — I10 ESSENTIAL HYPERTENSION: Chronic | ICD-10-CM

## 2023-12-26 DIAGNOSIS — S80.11XD LEG HEMATOMA, RIGHT, SUBSEQUENT ENCOUNTER: Primary | ICD-10-CM

## 2023-12-26 DIAGNOSIS — I48.0 PAROXYSMAL ATRIAL FIBRILLATION (HCC): ICD-10-CM

## 2023-12-26 DIAGNOSIS — J10.1 INFLUENZA A: ICD-10-CM

## 2023-12-26 PROCEDURE — 99309 SBSQ NF CARE MODERATE MDM 30: CPT

## 2023-12-26 RX ORDER — OSELTAMIVIR PHOSPHATE 30 MG/1
30 CAPSULE ORAL EVERY 12 HOURS SCHEDULED
COMMUNITY
Start: 2023-12-25 | End: 2023-12-30

## 2023-12-26 RX ORDER — GUAIFENESIN 200 MG/10ML
100 LIQUID ORAL EVERY 6 HOURS PRN
COMMUNITY
Start: 2023-12-25 | End: 2024-01-04

## 2023-12-26 NOTE — ASSESSMENT & PLAN NOTE
Hx of fall   Required evacuation and washout with split thickness skin graft and wound vac placement on 12/8/23  Wound vac was removed on 12/22/23   WBAT RLE   Continue tylenol for pain control

## 2023-12-26 NOTE — PROGRESS NOTES
[facility]  [address] [address]  [phone]  FACILITY: Transitional Care Facility  Code 31 (STR)          NAME: Verito Fallon  AGE: 83 y.o. SEX: female CODE STATUS: CPR    DATE OF ENCOUNTER: 12/26/2023    Assessment and Plan     1. Leg hematoma, right, subsequent encounter  Assessment & Plan:  Hx of fall   Required evacuation and washout with split thickness skin graft and wound vac placement on 12/8/23  Wound vac was removed on 12/22/23   WBAT RLE   Continue tylenol for pain control         2. Paroxysmal atrial fibrillation (HCC)  Assessment & Plan:  HR controlled  Continue amiodarone and metoprolol for rate control   Xarelto and prasugrel on hold pending outpatient surgery clearance  Continue lovenox for DVT prophylaxis at rehab       3. Chronic combined systolic and diastolic congestive heart failure (HCC)  Assessment & Plan:  Euvolemic on exam   Weights appear stable   Continue beta blocker   Continue bumex 2 mg daily   Monitor renal function   Continue daily weights        4. Essential hypertension  Assessment & Plan:  BP acceptable  Continue bumex and metoprolol   Continue hold parameters   Avoid hypotension   Monitor BP      5. Influenza A  Assessment & Plan:  Patient tested positive for Flu A over the weekend  Has since started Tamiflu   Continue supportive care  Continue robitussin prn for cough   Encourage fluids and rest   Monitor for worsening symptoms            All medications and routine orders were reviewed and updated as needed.    Chief Complaint     STR follow up visit    Past Medical and Surgica History      Past Medical History:   Diagnosis Date    Anal fissure     Cardiac disorder     Cognitive changes 12/23/2020    Esophageal reflux 12/15/2023    Esophagitis, reflux     Hemorrhoids     Hepatic hemangioma     Last Assessed: 1/13/2015    Herpes zoster     History of colonic polyps     Hypertension     Ischemic colitis (HCC)     Lumbar herniated disc     Malignant  neoplasm without specification of site (HCC)     Nephrolithiasis     L. Lithotripsy    Nontoxic single thyroid nodule     Last Assessed: 1/13/2015    Osteoarthritis     Overactive bladder     Raynaud disease     Respiratory system disease     Sjogren's disease (HCC)     Spinal stenosis     PONCHO (stress urinary incontinence, female)     Uterovaginal prolapse     Grade I-II     Past Surgical History:   Procedure Laterality Date    APPENDECTOMY  1947    CARDIAC PACEMAKER PLACEMENT  01/2021    CARDIAC SURGERY      CABG    CATARACT EXTRACTION Bilateral     COLONOSCOPY  2012    Fiberoptic    COLONOSCOPY      Resolved: 2006 - 2012 5 year f/u    CORONARY ANGIOPLASTY WITH STENT PLACEMENT      CORONARY ARTERY BYPASS GRAFT      Resolved: 2012    ESOPHAGOGASTRODUODENOSCOPY  2012    Diagnostic    HEMORROIDECTOMY      KNEE SURGERY      LITHOTRIPSY      Renal    MALIGNANT SKIN LESION EXCISION      Face; Resolved: 2004    OK ESOPHAGOGASTRODUODENOSCOPY TRANSORAL DIAGNOSTIC N/A 4/13/2016    Procedure: EGD AND COLONOSCOPY;  Surgeon: Evelin Bone MD;  Location: [facility];  Service: Gastroenterology    RENAL ARTERY STENT      SKIN LESION EXCISION      Scalp    SOFT TISSUE TUMOR RESECTION      Shoulder; Resolved: 1995    SPLIT THICKNESS SKIN GRAFT Right 12/14/2023    Procedure: SKIN GRAFT SPLIT THICKNESS (STSG)  EXTREMITY; VAC application;  Surgeon: Ap Brito DO;  Location: [facility];  Service: General    THROMBOLYSIS      Postoperative Thrombolysis PTCA    TONSILLECTOMY      Resolved: 1944    WOUND DEBRIDEMENT Right 12/8/2023    Procedure: DEBRIDEMENT LOWER EXTREMITY (WASH OUT); POSSIBLE VAC APPLICATION;  Surgeon: Abhijeet Davies MD;  Location: [facility];  Service: General     Allergies   Allergen Reactions    Sulfa Antibiotics Anaphylaxis    Formaldehyde     Codeine Palpitations          History of Present Illness     Patient being seen for follow up at [facility]. She is doing ok at present. She is received sitting in her  chair eating lunch. She reports dry cough. She was recently diagnosed with influenza A over the weekend and was started on Tamiflu. She has no other complaints at present. She is in no acute distress. She denies CP/SOB/N/V/D.           The patient's allergies, past medical, surgical, social and family history were reviewed and unchanged.    Review of Systems     Review of Systems   Respiratory:  Positive for cough.    Musculoskeletal:  Positive for gait problem.   Skin:  Positive for wound.   Neurological:  Positive for weakness.   All other systems reviewed and are negative.        Objective     Vitals:   Vitals:    12/26/23 1429   BP: 143/68   Pulse: 73   Temp: 97.8 °F (36.6 °C)   SpO2: 95%         Physical Exam  Vitals reviewed.   Constitutional:       General: She is not in acute distress.     Appearance: Normal appearance. She is not ill-appearing, toxic-appearing or diaphoretic.   HENT:      Head: Normocephalic and atraumatic.   Eyes:      Conjunctiva/sclera: Conjunctivae normal.   Cardiovascular:      Rate and Rhythm: Normal rate and regular rhythm.      Pulses: Normal pulses.      Heart sounds: Normal heart sounds. No murmur heard.  Pulmonary:      Effort: Pulmonary effort is normal. No respiratory distress.   Abdominal:      General: Bowel sounds are normal. There is no distension.      Palpations: Abdomen is soft.      Tenderness: There is no abdominal tenderness.   Musculoskeletal:      Right lower leg: No edema.      Left lower leg: No edema.   Skin:     General: Skin is warm and dry.          Neurological:      General: No focal deficit present.      Mental Status: She is alert and oriented to person, place, and time.      Motor: Weakness present.      Gait: Gait abnormal.   Psychiatric:         Mood and Affect: Mood normal.         Behavior: Behavior normal.         Thought Content: Thought content normal.         Pertinent Laboratory/Diagnostic Studies:   Reviewed in facility chart-stable      Current  "Medications   Medications reviewed and updated see facility MAR for details.      Current Outpatient Medications:     bumetanide (BUMEX) 2 mg tablet, TAKE 1 TABLET BY MOUTH EVERY DAY, Disp: 30 tablet, Rfl: 8       Please note:  Voice-recognition software may have been used in the preparation of this document.  Occasional wrong word or \"sound-alike\" substitutions may have occurred due to the inherent limitations of voice recognition software.  Interpretation should be guided by context.         KATHY Read  12/26/2023  2:41 PM    "

## 2023-12-26 NOTE — ASSESSMENT & PLAN NOTE
Euvolemic on exam   Weights appear stable   Continue beta blocker   Continue bumex 2 mg daily   Monitor renal function   Continue daily weights

## 2023-12-26 NOTE — ASSESSMENT & PLAN NOTE
BP acceptable  Continue bumex and metoprolol   Continue hold parameters   Avoid hypotension   Monitor BP

## 2023-12-26 NOTE — ASSESSMENT & PLAN NOTE
Patient tested positive for Flu A over the weekend  Has since started Tamiflu   Continue supportive care  Continue robitussin prn for cough   Encourage fluids and rest   Monitor for worsening symptoms

## 2023-12-26 NOTE — ASSESSMENT & PLAN NOTE
HR controlled  Continue amiodarone and metoprolol for rate control   Xarelto and prasugrel on hold pending outpatient surgery clearance  Continue lovenox for DVT prophylaxis at rehab

## 2023-12-28 ENCOUNTER — PATIENT OUTREACH (OUTPATIENT)
Dept: CASE MANAGEMENT | Facility: OTHER | Age: 83
End: 2023-12-28

## 2023-12-28 NOTE — PROGRESS NOTES
Chart review complete the patient is currently admitted to [facility] for STR. This Admin Coordinator will continue to monitor via chart review.

## 2023-12-29 ENCOUNTER — HOME HEALTH ADMISSION (OUTPATIENT)
Dept: HOME HEALTH SERVICES | Facility: HOME HEALTHCARE | Age: 83
End: 2023-12-29
Payer: MEDICARE

## 2023-12-29 ENCOUNTER — NURSING HOME VISIT (OUTPATIENT)
Dept: GERIATRICS | Facility: OTHER | Age: 83
End: 2023-12-29

## 2023-12-29 DIAGNOSIS — E87.6 HYPOKALEMIA: ICD-10-CM

## 2023-12-29 DIAGNOSIS — I49.5 SICK SINUS SYNDROME (HCC): ICD-10-CM

## 2023-12-29 DIAGNOSIS — Z87.898 HISTORY OF SEIZURE: ICD-10-CM

## 2023-12-29 DIAGNOSIS — I10 ESSENTIAL HYPERTENSION: Chronic | ICD-10-CM

## 2023-12-29 DIAGNOSIS — F32.89 OTHER DEPRESSION: ICD-10-CM

## 2023-12-29 DIAGNOSIS — I25.810 CORONARY ARTERY DISEASE INVOLVING OTHER CORONARY ARTERY BYPASS GRAFT WITHOUT ANGINA PECTORIS: ICD-10-CM

## 2023-12-29 DIAGNOSIS — K21.9 GASTROESOPHAGEAL REFLUX DISEASE WITHOUT ESOPHAGITIS: ICD-10-CM

## 2023-12-29 DIAGNOSIS — S80.11XD LEG HEMATOMA, RIGHT, SUBSEQUENT ENCOUNTER: Primary | ICD-10-CM

## 2023-12-29 DIAGNOSIS — J10.1 INFLUENZA A: ICD-10-CM
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
## 2023-12-29 DIAGNOSIS — I50.42 CHRONIC COMBINED SYSTOLIC AND DIASTOLIC CONGESTIVE HEART FAILURE (HCC): ICD-10-CM

## 2023-12-29 DIAGNOSIS — R41.89 COGNITIVE IMPAIRMENT: ICD-10-CM

## 2023-12-29 DIAGNOSIS — S62.306D CLOSED DISPLACED FRACTURE OF FIFTH METACARPAL BONE OF RIGHT HAND WITH ROUTINE HEALING, UNSPECIFIED PORTION OF METACARPAL, SUBSEQUENT ENCOUNTER: ICD-10-CM

## 2023-12-29 DIAGNOSIS — I48.0 PAROXYSMAL ATRIAL FIBRILLATION (HCC): ICD-10-CM

## 2023-12-29 DIAGNOSIS — I73.9 PAD (PERIPHERAL ARTERY DISEASE) (HCC): ICD-10-CM

## 2023-12-29 DIAGNOSIS — E78.2 MIXED HYPERLIPIDEMIA: ICD-10-CM

## 2023-12-29 RX ORDER — LEVETIRACETAM 500 MG/1
500 TABLET ORAL EVERY 12 HOURS SCHEDULED
Qty: 60 TABLET | Refills: 0 | Status: SHIPPED | OUTPATIENT
Start: 2023-12-31

## 2023-12-29 NOTE — ASSESSMENT & PLAN NOTE
Wt Readings from Last 3 Encounters:   12/07/23 59 kg (130 lb)   11/30/23 61.2 kg (135 lb)   11/30/23 61.4 kg (135 lb 6.4 oz)       strike-out   12/29/2023 05:49 130.0 Lbs Bed dhoward (Manual)    strike-out   12/28/2023 05:31 132.0 Lbs Bed shazzard (Manual)    strike-out   12/27/2023 05:08 128.4 Lbs Standing shazzard (Manual)    strike-out   12/26/2023 06:51 128.2 Lbs Bed mmaghacot (Manual)     12/26/2023 06:51 by Rosario Galan  Incorrect Documentation   strike-out   12/25/2023 06:23 125.0 Lbs Bed irivera (Manual)    strike-out   12/24/2023 05:40 126.7 Lbs Bed dhoward (Manual)    strike-out   12/24/2023 05:20 126.7 Lbs Bed mmaghacot (Manual)    strike-out   12/23/2023 05:04 128.2 Lbs Bed shazzard (Manual)    strike-out   12/22/2023 06:17 128.2 Lbs Bed dhoward (Manual)    strike-out   12/21/2023 05:12 126.2 Lbs Bed dhoward (Manual)    strike-out   12/20/2023 06:47 128.4 Lbs Bed mtrexler (Manual)    strike-out   12/18/2023 03:35 134.0 Lbs Standing jcarpenter (Manual)    strike-out   12/17/2023 06:23 137.7 Lbs Standing jcarpenter (Manual)    strike-out   12/16/2023 05:40 130.2 Lbs Bed dhoward (Manual)    strike-out   12/15/2023 14:26 132.0 Lbs Bed cferry (Manual)        Monitor daily weight - stable, not uptrending  Monitor volume status clinically - appears euvolemic  Continue home bumex 2mg daily (with hold parameters) and monitor routine labs  No apparent or recent exacerbation  Follow up with PCP, Cardiology

## 2023-12-29 NOTE — ASSESSMENT & PLAN NOTE
Potassium has been alternately high and low on some recent labs  Low potassium likely in relation to bumex use  Monitor on routine labs  Replete PRN (started on 40meq PO daily for several days as of 12/29)  Consider adding regular potassium supplementation if persistently low/while on bumex  Recommend check Mg in outpatient setting (planned to check at rehab but plans changed as patient now being discharged Sunday)  Ordered repeat BMP outpatient to be collected by homecare service if possible  Follow up closely with PCP, recommend routine outpatient monitoring

## 2023-12-29 NOTE — ASSESSMENT & PLAN NOTE
Hx of fall  Recent hospital admission due to hematoma noted to be open/bleeding per  at home  patient taken to OR and underwent washout and split thickness skin graft and wound vac placement on 12/8/23 (non-weightbearing with VAC per hospital records)  Case discussed with Surgery team and wound vac removed 12/22, patient now WBAT  RLE recipient and LLE donor site appearing stable, no notable associated pain  Wound care consulted and following at rehab  As of 12/19 stopped oxycodone, continue PRN tylenol for pain  to f/u with Surgery

## 2023-12-29 NOTE — ASSESSMENT & PLAN NOTE
Documented dx of MCI  MoCA recorded as 19/30 on 6/14/21, 18/30 on 10/18/21  Reported to be at baseline mentation as of discharge from hospital (reportedly Aox2 at baseline)  At rehab noted to be Aox3 and reasonable historian, can be forgetful of details at times, seems to be at her baseline - given exam findings and baseline functional status at home seems consistent with likely MCI  Continue to monitor  Supportive care  Follow up with PCP, Neurology as appropriate, consider further routine outpatient memory testing once stable in outpatient setting  -SW following for safe discharge planning/homecare services as appropriate

## 2023-12-29 NOTE — PROGRESS NOTES
[facility]  Facility: [facility]    DISCHARGE SUMMARY  Nursing Home Place of Service: 31: SNF/Short Term Rehab    NAME: Verito Fallon  : 1940 AGE: 83 y.o. SEX: female MRN: [id number]  DATE OF ENCOUNTER: 2023    DATE OF ADMISSION: 12/15/23 DATE OF DISCHARGE:planned 23 DISCHARGE DISPOSITION: home with homecare    HPI: Verito Fallon is a 83 y.o. female with PMH including HTN, chronic combined CHF, Afib, PAD, seizure, HLD, depression, sick sinus syndrome, cognitive impairment     Reason for admission: Patient was hospitalized at [facility] from  to 12/15/23  For details of hospitalization, see hospital records including discharge documentation from 12/15/23  Briefly, patient hospitalized for re-evaluation of recent R leg hematoma ( had been changing dressing at home and noticed hematoma was open/bleeding, patient on Xarelto); patient taken to OR and underwent washout and split thickness skin graft and wound vac placement on 23; non-weightbearing with VAC per hospital records; to f/u with Surgery      Course of stay: Patient was admitted to [facility] for rehabilitation due to physical deconditioning and hematoma. Significant events during the stay include: flu A positive. The patient participated in PT/OT.     Patient seen and examined in room.  Others present:  Leander  Patient seated in chair  Appears comfortable, awake, alert, oriented to situation, able to converse appropriately  Patient polite and in good spirits, noting she feels well overall. and with no new/acute complaints. R lateral lower leg hematoma site has had wound vac removed and site is not bothering her, she notes no associated pain. No notable pain anywhere. Appetite has been good, no swallowing issues, breathing fine, no fevers/chills, no abd pain/N/V/D, +chronic constipation seems better, last BM today normal. No  "chest pain/palpitations. No acute cardiopulmonary, abdominal, or urinary symptoms; see ROS for more details. She has been flu positive recently with no acute/worsening symptoms, notes some mild stable intermittent dry cough but otherwise does not feel sick.  They do not think she needs any refills to pharmacy,  has her home supply at home and she will be  sent with rehab meds. Patient/ state they will schedule appt closely with PCP within 1-2 weeks.  No further questions or acute concerns identified.    Lab Review:   12/15: BMP generally stable, K 5.5 (slightly high), CBC generally stable/non-actionable; COVID negative 12/14; last TSH WNL from 2021; last A1c 5.5 from 2019 12/18: BMP, CBC generally stable/non-actionable  12/24: flu A positive  12/26: BMP with K 3.1  12/29: BMP with K 2.6        XR right hand/wrist 12/13 \"Healing fracture \"  CXR 11/30 \"No acute cardiopulmonary disease \"     EKG 11/12/23: AV dual paced complexes, 77bpm, Qtc 538 (image not accessible)  Echo Jan 2021: EF 50, low-normal systolic function, grade 2 diastolic dysfunction         Assessment/Plan:    Influenza A  Tested positive for Flu A on 12/24  Was started on 5 day Tamiflu course as of 12/25  No major or worsening symptoms  Continue supportive care  PRN robitussin for cough  Continue to monitor  Isolation as per facility protocol      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)  Wt Readings from Last 3 Encounters:   12/07/23 59 kg (130 lb)   11/30/23 61.2 kg (135 lb)   11/30/23 61.4 kg (135 lb 6.4 oz)       strike-out   12/29/2023 05:49 130.0 Lbs Bed dhoward (Manual)    strike-out   12/28/2023 05:31 132.0 Lbs Bed shazzard (Manual)    strike-out   12/27/2023 05:08 128.4 Lbs Standing shazzard (Manual)    strike-out   12/26/2023 06:51 128.2 Lbs Bed mmaghacot (Manual)     12/26/2023 06:51 by Rosario Galan  Incorrect Documentation   strike-out   12/25/2023 06:23 125.0 Lbs Bed irivera (Manual)    strike-out   " 12/24/2023 05:40 126.7 Lbs Bed dhoward (Manual)    strike-out   12/24/2023 05:20 126.7 Lbs Bed mmaghacot (Manual)    strike-out   12/23/2023 05:04 128.2 Lbs Bed shazzard (Manual)    strike-out   12/22/2023 06:17 128.2 Lbs Bed dhoward (Manual)    strike-out   12/21/2023 05:12 126.2 Lbs Bed dhoward (Manual)    strike-out   12/20/2023 06:47 128.4 Lbs Bed mtrexler (Manual)    strike-out   12/18/2023 03:35 134.0 Lbs Standing jcarpenter (Manual)    strike-out   12/17/2023 06:23 137.7 Lbs Standing jcarpenter (Manual)    strike-out   12/16/2023 05:40 130.2 Lbs Bed dhoward (Manual)    strike-out   12/15/2023 14:26 132.0 Lbs Bed cferry (Manual)        Monitor daily weight - stable, not uptrending  Monitor volume status clinically - appears euvolemic  Continue home bumex 2mg daily (with hold parameters) and monitor routine labs  No apparent or recent exacerbation  Follow up with PCP, Cardiology          Leg hematoma, right, subsequent encounter  Hx of fall  Recent hospital admission due to hematoma noted to be open/bleeding per  at home  patient taken to OR and underwent washout and split thickness skin graft and wound vac placement on 12/8/23 (non-weightbearing with VAC per hospital records)  Case discussed with Surgery team and wound vac removed 12/22, patient now WBAT  RLE recipient and LLE donor site appearing stable, no notable associated pain  Wound care consulted and following at rehab  As of 12/19 stopped oxycodone, continue PRN tylenol for pain  to f/u with Surgery    Hypokalemia  Potassium has been alternately high and low on some recent labs  Low potassium likely in relation to bumex use  Monitor on routine labs  Replete PRN (started on 40meq PO daily for several days as of 12/29)  Consider adding regular potassium supplementation if persistently low/while on bumex  Recommend check Mg in outpatient setting (planned to check at rehab but plans changed as patient now being discharged Sunday)  Ordered repeat BMP  "outpatient to be collected by homecare service if possible  Follow up closely with PCP, recommend routine outpatient monitoring      A-fib (HCC)  Chronic hx of atrial fibrillation  On Xarelto and prasugrel outpatient, held in hospital and per records recommended to continue holding pending outpatient surgery clearance  Continue clot prophylaxis with lovenox in hospital/rehab  Continue regimen including amiodarone, metoprolol with holding parameters  No acute cardiac complaints  Follow up with PCP, Cardiology    Esophageal reflux  -stable  -monitor without PPI  -recommend OOB with meals, sit upright for at least 30 minutes afterwards, avoid trigger foods  -continue to monitor  -follow up with PCP, GI as appropriate    CAD (coronary artery disease)  No acute cardiac complaints  Continue statin  Home Xarelto and prasugrel held in recent hospitalization due to hematoma, per records recommended to hold pending outpatient clearance  Follow up with PCP, Cardiology    Essential hypertension  Monitor BP - generally stable/controlled in 120-140 systolic range, rarely higher or lower  Avoid hypotension  No acute cardiac complaints  Continue regimen as above    PAD (peripheral artery disease) ([facility])  Continue statin  Xarelto and prasugrel being held as above pending outpatient/surgery clearance  Follow up with PCP, Vascular as appropriate    Sick sinus syndrome ([facility])  S/p dual chamber pacemaker in 2021 per records  Follow up with Cardiology    Closed fracture of fifth metacarpal bone of right hand  Noted in recent hospitalization Nov 2023 s/p fall  Evaluated by Orthopedics with non-operative management recommended  As per recent Orthopedic recommendations \"WBAT with TKO wrist brace on when out and about  At home please take off the wrist brace and continue finger range of motion to prevent stiffness\"  Pain control as appropriate - no notable associated pain  PT/OT  Follow up with Orthopedic/hand surgery outpatient    Cognitive " impairment  Documented dx of MCI  MoCA recorded as 19/30 on 6/14/21, 18/30 on 10/18/21  Reported to be at baseline mentation as of discharge from hospital (reportedly Aox2 at baseline)  At rehab noted to be Aox3 and reasonable historian, can be forgetful of details at times, seems to be at her baseline - given exam findings and baseline functional status at home seems consistent with likely MCI  Continue to monitor  Supportive care  Follow up with PCP, Neurology as appropriate, consider further routine outpatient memory testing once stable in outpatient setting  -SW following for safe discharge planning/homecare services as appropriate    Depression  Mood stable  Continue sertraline  Supportive care  Follow up with PCP, therapy/psych services outpatient as appropriate    Fall  Recent hospitalization s/p mechanical fall Nov 2023  -PT/OT  -fall precautions  -encourage appropriate DME use      History of seizure  Continue keppra  Follow up with PCP, Neuro as appropriate    Hyperlipidemia  Continue statin          Review of Systems   Constitutional:  Negative for appetite change, chills, diaphoresis, fatigue and fever.   HENT:  Negative for drooling, ear pain, hearing loss, rhinorrhea, sore throat and trouble swallowing.    Eyes:  Negative for pain, discharge, redness, itching and visual disturbance.   Respiratory:  Negative for cough, choking, chest tightness, shortness of breath and wheezing.    Cardiovascular:  Negative for chest pain, palpitations and leg swelling.   Gastrointestinal:  Negative for abdominal pain, blood in stool, constipation (stable/improved), diarrhea, nausea and vomiting.   Genitourinary:  Negative for difficulty urinating, dysuria, hematuria and urgency.   Musculoskeletal:  Positive for gait problem. Negative for arthralgias, back pain and neck pain.   Skin:  Positive for wound. Negative for color change.   Neurological:  Negative for dizziness, syncope, facial asymmetry, speech difficulty,  weakness and headaches.   Psychiatric/Behavioral:  Negative for agitation, behavioral problems and confusion. The patient is not nervous/anxious and is not hyperactive.    All other systems reviewed and are negative.      Vital Signs:     BP: 128/58 mmHg  12/29/2023 08:09   Temp:97.3 °F  12/29/2023 08:09 Pulse:69 bpm  12/29/2023 08:09   Weight:130 Lbs  12/29/2023 05:49   Resp:18 Breaths/min  12/29/2023 08:09 BS: O2:93 %  12/29/2023 11:42 Pain:0  12/29/2023 11:03       Exam:     Physical Exam  Vitals reviewed.   Constitutional:       General: She is not in acute distress.     Appearance: She is not toxic-appearing or diaphoretic.   HENT:      Head: Normocephalic and atraumatic.      Right Ear: External ear normal.      Left Ear: External ear normal.      Nose: Nose normal. No rhinorrhea.      Mouth/Throat:      Mouth: Mucous membranes are moist.      Pharynx: Oropharynx is clear. No posterior oropharyngeal erythema.   Eyes:      General: No scleral icterus.        Right eye: No discharge.         Left eye: No discharge.      Extraocular Movements: Extraocular movements intact.      Conjunctiva/sclera: Conjunctivae normal.      Pupils: Pupils are equal, round, and reactive to light.   Cardiovascular:      Rate and Rhythm: Normal rate and regular rhythm.   Pulmonary:      Effort: Pulmonary effort is normal. No respiratory distress.      Breath sounds: Normal breath sounds. No wheezing.   Abdominal:      General: Bowel sounds are normal.      Palpations: Abdomen is soft.      Tenderness: There is no abdominal tenderness. There is no guarding.   Musculoskeletal:         General: No swelling.      Cervical back: No rigidity.      Right lower leg: No edema.      Left lower leg: No edema.      Comments: No notable bilateral peripheral edema  R lateral shin with ~2 inch wound with wound vac removed, skin graft appearing stable/healing well, no noted associated acute bleed/drainage/purulence/necrosis  R hand in brace, able to  move/flex fingers  L anterior thigh donor site appears clean/dry   Skin:     General: Skin is warm and dry.      Coloration: Skin is not jaundiced.   Neurological:      General: No focal deficit present.      Mental Status: She is alert and oriented to person, place, and time. Mental status is at baseline.   Psychiatric:         Mood and Affect: Mood normal.         Behavior: Behavior normal.         Thought Content: Thought content normal.         Judgment: Judgment normal.           Discharge Medications: See discharge medication list which was reviewed and signed.    Status at time of discharge: Stable    Discussion with patient/family as appropriate and further instructions:  -Fall precautions  -Aspiration precautions  -Bleeding precautions  -Monitor for signs/symptoms of infection  -Medication list was reviewed and signed  -DME form was completed    Follow-up Recommendations: Please follow-up with your primary care physician within 7-10 days of discharge to review medication changes and current status.     Problem List Follow-up Recommendations:      -Total time spent on this encounter today including documentation and workup review, face to face time, history and exam, and documentation/orders was approximately 45 minutes.  -This note will be copied to [facility] EMR where vitals and medication orders are placed.    Ayo Leija D.O.  Geriatric Medicine  12/29/2023 3:33 PM

## 2023-12-29 NOTE — ASSESSMENT & PLAN NOTE
Tested positive for Flu A on 12/24  Was started on 5 day Tamiflu course as of 12/25  No major or worsening symptoms  Continue supportive care  PRN robitussin for cough  Continue to monitor  Isolation as per facility protocol

## 2023-12-29 NOTE — ASSESSMENT & PLAN NOTE
Recent hospitalization s/p mechanical fall Nov 2023  -PT/OT  -fall precautions  -encourage appropriate DME use

## 2023-12-29 NOTE — ADDENDUM NOTE
Addended by: LORETTA RODRIGUEZ on: 12/29/2023 03:34 PM     Modules accepted: Orders, Level of Service

## 2024-01-02 ENCOUNTER — PATIENT OUTREACH (OUTPATIENT)
Dept: CASE MANAGEMENT | Facility: OTHER | Age: 84
End: 2024-01-02

## 2024-01-02 DIAGNOSIS — N17.9 AKI (ACUTE KIDNEY INJURY) (HCC): Primary | ICD-10-CM

## 2024-01-02 NOTE — PROGRESS NOTES
Outpatient Care Manager Note:    Identified for SNF/BRAXTON pathway.  Chart Review completed.  Discharged from [facility] on 12/29/2023.  She is doing well since discharge, walking okay and has minimal burning pain on right leg but states she doesn't need tylenol at this time.  She says she rarely uses pain medication.  Advised to stick with tylenol and not to use NSAIDS.      Verito will call her PCP to make an appointment asap.    She is eating and drinking like always.    She prefers not to weigh herself, but has no edema.      She hasn't been monitoring her BP, but will look for the monitor to start checking.  Encouraged her to check since she is on antihypertensives.    Medication review done.  She is not taking Keppra.  Note routed to MD with this information.  She stated that she wasn't sure why she would be on this medication as neither she nor her family witnessed any seizures.  (This may have been D/C in rehab).    BMP is scheduled to be drawn with [facility] visit.     Verified that [facility]  RN scheduled for 1/3/2024 and will draw BMP.

## 2024-01-02 NOTE — PROGRESS NOTES
ADT alert received the patient discharged 12/29/23 to Home with [facility].  I have removed myself off of the care team, added the CM to the care team who will follow the patient through the episode, sent the care manager an inbasket notifying them of the B[facility]XTON/SNF Pathway.  Ambulatory referral placed for complex care management.  Discharged form attached to this encounter.

## 2024-01-03 ENCOUNTER — HOME CARE VISIT (OUTPATIENT)
Dept: HOME HEALTH SERVICES | Facility: HOME HEALTHCARE | Age: 84
End: 2024-01-03
Payer: MEDICARE

## 2024-01-03 VITALS
TEMPERATURE: 98.2 F | BODY MASS INDEX: 25.2 KG/M2 | OXYGEN SATURATION: 97 % | HEIGHT: 59 IN | WEIGHT: 125 LBS | HEART RATE: 78 BPM | RESPIRATION RATE: 18 BRPM | DIASTOLIC BLOOD PRESSURE: 72 MMHG | SYSTOLIC BLOOD PRESSURE: 122 MMHG

## 2024-01-03 PROCEDURE — 400013 VN SOC

## 2024-01-03 PROCEDURE — G0151 HHCP-SERV OF PT,EA 15 MIN: HCPCS

## 2024-01-03 PROCEDURE — G0299 HHS/HOSPICE OF RN EA 15 MIN: HCPCS

## 2024-01-03 PROCEDURE — 10330081 VN NO-PAY CLAIM PROCEDURE

## 2024-01-04 ENCOUNTER — HOME CARE VISIT (OUTPATIENT)
Dept: HOME HEALTH SERVICES | Facility: HOME HEALTHCARE | Age: 84
End: 2024-01-04
Payer: MEDICARE

## 2024-01-04 NOTE — CASE COMMUNICATION
Ship to . Home     Branch:  Reilly Leija,     Wound 1 left thigh      Full     Frequency every other day  Wound 2 right lower leg  Full    Frequency every other day    All items are ordered by the each unless otherwise noted.  Orders should be for a 2 week period (unless noted by insurance)    Cleansers  [place] 737063 -1  Gauze 4x4 N/S 200 4x4s per unit  356593-7   Gauze Juanita stretch roll 4inch n/s 1 2 rolls per unit  424223 -1  ABD 5x9 855523- 8  Alginate with Silver 431471- 1  Gauze oil emulsion 987916 -4  Gauze Xeroform 5x9 018082 -1

## 2024-01-04 NOTE — CASE COMMUNICATION
[facility] has admitted your patient to Home Health service with the following disciplines:      PT, OT and SN  This report is informational only, no response is needed  Primary focus of home health care- Integumentary   Patient stated goals of care- leg wounds to heal.    Anticipated visit pattern and next visit date- Next SN visit 1/5 2w1 4w1 3w1 4w1 3w1 4w1 3w1   See medication list - meds in home differ from AVS  Significant cli nical findings- Pt is not taking levETIRAcetam, Potassium Chloride, Robitussin, and ipratropium.  Potential barriers to goal achievement- can not perform wound care independent.      Thank you for allowing us to participate in the care of your patient.      Maryann Valdes RN

## 2024-01-04 NOTE — CASE COMMUNICATION
Hello, I am the home health nurse that visited Mrs. Fallon in her home today. After reconciling her medication I wanted to notify you, the pt is no longer taking levetiracetam, potassium chloride, robitussin, and ipratropium listed on her discharge paperwork from the rehab.  Mr. Fallon states  levetiracetam and potassium chloride were discontinued a while ago, but can not recall when. I do see she is currently taking bumetanide and wanted  to make you aware of her current medication regiment.  I have instructed the family to set up an appt with her PCP to review her medication and adjustments.       Thank you,    Maryann Valdes RN

## 2024-01-05 ENCOUNTER — APPOINTMENT (OUTPATIENT)
Dept: LAB | Facility: MEDICAL CENTER | Age: 84
End: 2024-01-05
Payer: MEDICARE

## 2024-01-05 ENCOUNTER — HOME CARE VISIT (OUTPATIENT)
Dept: HOME HEALTH SERVICES | Facility: HOME HEALTHCARE | Age: 84
End: 2024-01-05
Payer: MEDICARE

## 2024-01-05 ENCOUNTER — PATIENT OUTREACH (OUTPATIENT)
Dept: CASE MANAGEMENT | Facility: OTHER | Age: 84
End: 2024-01-05

## 2024-01-05 VITALS
SYSTOLIC BLOOD PRESSURE: 142 MMHG | DIASTOLIC BLOOD PRESSURE: 78 MMHG | HEART RATE: 74 BPM | OXYGEN SATURATION: 99 % | RESPIRATION RATE: 17 BRPM | TEMPERATURE: 97 F

## 2024-01-05 VITALS — HEART RATE: 78 BPM | RESPIRATION RATE: 18 BRPM

## 2024-01-05 VITALS
HEART RATE: 78 BPM | OXYGEN SATURATION: 97 % | RESPIRATION RATE: 18 BRPM | DIASTOLIC BLOOD PRESSURE: 72 MMHG | SYSTOLIC BLOOD PRESSURE: 122 MMHG

## 2024-01-05 VITALS — SYSTOLIC BLOOD PRESSURE: 146 MMHG | DIASTOLIC BLOOD PRESSURE: 82 MMHG | HEART RATE: 88 BPM | RESPIRATION RATE: 18 BRPM

## 2024-01-05 PROCEDURE — G0299 HHS/HOSPICE OF RN EA 15 MIN: HCPCS

## 2024-01-05 PROCEDURE — G0152 HHCP-SERV OF OT,EA 15 MIN: HCPCS

## 2024-01-05 PROCEDURE — G0151 HHCP-SERV OF PT,EA 15 MIN: HCPCS

## 2024-01-05 NOTE — PROGRESS NOTES
In Basket message from Dr. Warner Re: Verito not taking Keppra.  He would like her to see Dr. Batres, Neurologist.  I called Verito to give her Dr. Batres's phone number and to remind her to make her follow up appointment to see Dr. Warner.  She will further discuss the need to see the neurologist when she sees Dr. Warner.

## 2024-01-07 ENCOUNTER — HOME CARE VISIT (OUTPATIENT)
Dept: HOME HEALTH SERVICES | Facility: HOME HEALTHCARE | Age: 84
End: 2024-01-07
Payer: MEDICARE

## 2024-01-07 VITALS
SYSTOLIC BLOOD PRESSURE: 122 MMHG | RESPIRATION RATE: 18 BRPM | HEART RATE: 78 BPM | OXYGEN SATURATION: 99 % | TEMPERATURE: 97.5 F | DIASTOLIC BLOOD PRESSURE: 70 MMHG

## 2024-01-07 PROCEDURE — G0299 HHS/HOSPICE OF RN EA 15 MIN: HCPCS

## 2024-01-08 ENCOUNTER — OFFICE VISIT (OUTPATIENT)
Dept: OBGYN CLINIC | Facility: CLINIC | Age: 84
End: 2024-01-08
Payer: MEDICARE

## 2024-01-08 ENCOUNTER — APPOINTMENT (OUTPATIENT)
Dept: RADIOLOGY | Age: 84
End: 2024-01-08
Payer: MEDICARE

## 2024-01-08 VITALS
BODY MASS INDEX: 25.2 KG/M2 | WEIGHT: 125 LBS | HEART RATE: 84 BPM | DIASTOLIC BLOOD PRESSURE: 73 MMHG | SYSTOLIC BLOOD PRESSURE: 155 MMHG | HEIGHT: 59 IN

## 2024-01-08 DIAGNOSIS — S62.346D CLOSED NONDISPLACED FRACTURE OF BASE OF FIFTH METACARPAL BONE OF RIGHT HAND WITH ROUTINE HEALING, SUBSEQUENT ENCOUNTER: Primary | ICD-10-CM

## 2024-01-08 DIAGNOSIS — S62.346D CLOSED NONDISPLACED FRACTURE OF BASE OF FIFTH METACARPAL BONE OF RIGHT HAND WITH ROUTINE HEALING, SUBSEQUENT ENCOUNTER: ICD-10-CM

## 2024-01-08 DIAGNOSIS — M79.641 RIGHT HAND PAIN: ICD-10-CM

## 2024-01-08 PROCEDURE — 99213 OFFICE O/P EST LOW 20 MIN: CPT | Performed by: PHYSICIAN ASSISTANT

## 2024-01-08 PROCEDURE — G0180 MD CERTIFICATION HHA PATIENT: HCPCS | Performed by: STUDENT IN AN ORGANIZED HEALTH CARE EDUCATION/TRAINING PROGRAM

## 2024-01-08 PROCEDURE — 73110 X-RAY EXAM OF WRIST: CPT

## 2024-01-08 NOTE — CASE COMMUNICATION
Ship to Clinician to replace supplies given to pt  Branch: Reilly   Ordering MD Ayo Leija, DO   Wound 1 left thigh Full Frequency every other day   Wound 2 right lower leg Full Frequency every other day      All items are ordered by the each unless otherwise noted.  Orders should be for a 2 week period (unless noted by insurance)    Cleansers  Saline 250ml 1    Dry Dressings   Gauze Juanita stretch roll 4inch n/s 12 rolls per u nit  [id number] 1  ABD 5x9 147156 3  Moist Wound Products  Gauze Xeroform 5x9 [id number] 1

## 2024-01-08 NOTE — PROGRESS NOTES
Orthopaedic Surgery - Office Note  Verito Fallon (83 y.o. female)   : 1940   MRN: [id number]  Encounter Date: 2024    Chief Complaint   Patient presents with    Right Hand - Pain         Assessment/Plan  Diagnoses and all orders for this visit:    Closed nondisplaced fracture of base of fifth metacarpal bone of right hand with routine healing, subsequent encounter  -     XR wrist 3+ vw right; Future    Right hand pain  -     Ambulatory Referral to Orthopedic Surgery    The diagnosis as well as treatment options were reviewed with patient and  in the office today.  Patient may discontinue the TKO brace (patient is already stopped using).  I would not recommend any occupational therapy at this time as she has a well-maintained range of motion and strength symmetrical to contralateral side without symptoms.    She may ice the hand if it becomes sore 20 minutes on 1 hour off 3 times a day.  Patient will call for an appointment if any pain redevelops.  All question concerns were answered in the office today.     Return if symptoms worsen or fail to improve.        History of Present Illness  This is a previous orthopedic patient here for right hand recheck.  Patient had a fall on 2023 resulting in nondisplaced fifth meta carpal fracture.  She was treated by Dr. Bejarano on 2023 and elected to proceed nonoperatively with a TKO brace.  She was lost to follow-up and eventually returned back to [facility] for an unrelated right leg hematoma.  She had x-rays taken at that time and was noted to be intermittently wearing the TKO brace.  X-rays at that time showed healing fifth metacarpal fracture.  She was advised to follow-up with a hand surgeon closer to where she lives.  She presents today approximately 7 weeks out from injury.  No new symptoms or trauma are reported.  Patient is not wearing the brace and reports having not worn it for some time.  No paresthesias are noted    Review  "of Systems  Pertinent items are noted in HPI.  All other systems were reviewed and are negative.    Physical Exam  /73 (BP Location: Left arm, Patient Position: Sitting, Cuff Size: Standard)   Pulse 84   Ht 4' 11\" (1.499 m)   Wt 56.7 kg (125 lb)   BMI 25.25 kg/m²   Cons: Appears well.  No apparent distress.  Psych: Alert. Oriented x3.  Mood and affect normal.  Right hand has no skin breakdown lesions or signs of infection.  There is no tenderness to palpation at the base of the fifth metacarpal.  She is able to make a composite fist without abnormality.  She has full range of motion of all digits without pain.  Her wrist range of motion is within normal limits without any reproducible symptoms.   strength and pinch strength is 5 out of 5.      X-ray performed in the office today 3 views of the right wrist show nondisplaced fracture at the base of the fifth metacarpal with callus formation noted.  Unchanged from previous x-rays.  X-rays reviewed from orthopedic standpoint will await official radiologist interpretation      Studies Reviewed  RIGHT HAND     INDICATION:   4 wk f/u.     COMPARISON: 11/12/2023     VIEWS:  XR HAND 3+ VW RIGHT, XR WRIST 3+ VW RIGHT        For the purposes of institution wide universal language the following terms will apply: (thumb=1st digit/finger, index finger=2nd digit/finger, long finger=3rd digit/finger, ring=4th digit/finger and small finger=5th digit/finger)     FINDINGS:     Fifth proximal phalanx dorsomedial displaced fracture with callus. No alignment change.     There is no new acute fracture or dislocation.     No significant degenerative changes.     No lytic or blastic osseous lesion.     Soft tissues are unremarkable.     IMPRESSION:     Healing fracture           Workstation performed: [id number]  X-ray images as well as reports were reviewed by myself in the office today.  Previous hand surgeon notes were reviewed by myself in the office today.  Emergency " department notes as well as orthopedic consult were reviewed by myself in the office today.      Procedures  No procedures today.    Medical, Surgical, Family, and Social History  The patient's medical history, family history, and social history, were reviewed and updated as appropriate.    Past Medical History:   Diagnosis Date    Anal fissure     Cardiac disorder     Cognitive changes 12/23/2020    Esophageal reflux 12/15/2023    Esophagitis, reflux     Hemorrhoids     Hepatic hemangioma     Last Assessed: 1/13/2015    Herpes zoster     History of colonic polyps     Hypertension     Ischemic colitis (HCC)     Lumbar herniated disc     Malignant neoplasm without specification of site (HCC)     Nephrolithiasis     L. Lithotripsy    Nontoxic single thyroid nodule     Last Assessed: 1/13/2015    Osteoarthritis     Overactive bladder     Raynaud disease     Respiratory system disease     Sjogren's disease (HCC)     Spinal stenosis     PONCHO (stress urinary incontinence, female)     Uterovaginal prolapse     Grade I-II       Past Surgical History:   Procedure Laterality Date    APPENDECTOMY  1947    CARDIAC PACEMAKER PLACEMENT  01/2021    CARDIAC SURGERY      CABG    CATARACT EXTRACTION Bilateral     COLONOSCOPY  2012    Fiberoptic    COLONOSCOPY      Resolved: 2006 - 2012 5 year f/u    CORONARY ANGIOPLASTY WITH STENT PLACEMENT      CORONARY ARTERY BYPASS GRAFT      Resolved: 2012    ESOPHAGOGASTRODUODENOSCOPY  2012    Diagnostic    HEMORROIDECTOMY      KNEE SURGERY      LITHOTRIPSY      Renal    MALIGNANT SKIN LESION EXCISION      Face; Resolved: 2004    IL ESOPHAGOGASTRODUODENOSCOPY TRANSORAL DIAGNOSTIC N/A 4/13/2016    Procedure: EGD AND COLONOSCOPY;  Surgeon: Evelin Bone MD;  [facility];  Service: Gastroenterology    RENAL ARTERY STENT      SKIN LESION EXCISION      Scalp    SOFT TISSUE TUMOR RESECTION      Shoulder; Resolved: 1995    SPLIT THICKNESS SKIN GRAFT Right 12/14/2023    Procedure: SKIN GRAFT  SPLIT THICKNESS (STSG)  EXTREMITY; VAC application;  Surgeon: Ap Brito DO;  [facility];  Service: General    THROMBOLYSIS      Postoperative Thrombolysis PTCA    TONSILLECTOMY      Resolved: 1944    WOUND DEBRIDEMENT Right 12/8/2023    Procedure: DEBRIDEMENT LOWER EXTREMITY (WASH OUT); POSSIBLE VAC APPLICATION;  Surgeon: Abhijeet Davies MD;  [facility];  Service: General       Family History   Problem Relation Age of Onset    Heart disease Mother     Hypertension Mother     Osteoporosis Mother     Heart disease Father     Hypertension Father     No Known Problems Sister     Heart disease Brother     Diabetes Brother     No Known Problems Daughter     No Known Problems Son     No Known Problems Maternal Grandmother     No Known Problems Maternal Grandfather     No Known Problems Paternal Grandmother     No Known Problems Paternal Grandfather     Ulcerative colitis Family     Colon polyps Family     Breast cancer Neg Hx     Colon cancer Neg Hx     Ovarian cancer Neg Hx        Social History     Occupational History    Occupation: retired   Tobacco Use    Smoking status: Never    Smokeless tobacco: Never   Vaping Use    Vaping status: Never Used   Substance and Sexual Activity    Alcohol use: Yes     Alcohol/week: 1.0 standard drink of alcohol     Types: 1 Glasses of wine per week     Comment: occasionally, but no alcohol intake recently    Drug use: Never    Sexual activity: Not Currently       Allergies   Allergen Reactions    Sulfa Antibiotics Anaphylaxis    Formaldehyde     Codeine Palpitations         Current Outpatient Medications:     acetaminophen (TYLENOL) 325 mg tablet, Take 2 tablets (650 mg total) by mouth every 4 (four) hours as needed for mild pain, Disp: , Rfl: 0    amiodarone 200 mg tablet, TAKE 1/2 TABLET (100 MG TOTAL) BY MOUTH DAILY WITH BREAKFAST, Disp: 15 tablet, Rfl: 8    atorvastatin (LIPITOR) 20 mg tablet, TAKE 1 TABLET BY MOUTH EVERY DAY WITH DINNER, Disp: 90  tablet, Rfl: 4    benzonatate (TESSALON PERLES) 100 mg capsule, Take 1 capsule (100 mg total) by mouth 3 (three) times a day as needed for cough, Disp: 20 capsule, Rfl: 0    bumetanide (BUMEX) 2 mg tablet, TAKE 1 TABLET BY MOUTH EVERY DAY, Disp: 30 tablet, Rfl: 8    docusate sodium (COLACE) 100 mg capsule, Take 1 capsule (100 mg total) by mouth 2 (two) times a day (Patient not taking: Reported on 1/2/2024), Disp: 10 capsule, Rfl: 0    fluticasone (FLONASE) 50 mcg/act nasal spray, 2 sprays into each nostril daily, Disp: 9.9 mL, Rfl: 0    hydrOXYzine HCL (ATARAX) 25 mg tablet, Take 1 tablet (25 mg total) by mouth every 6 (six) hours as needed for itching, Disp: 30 tablet, Rfl: 0    ipratropium (ATROVENT) 0.03 % nasal spray, 2 sprays into each nostril every 12 (twelve) hours, Disp: 30 mL, Rfl: 0    levETIRAcetam (Keppra) 500 mg tablet, Take 1 tablet (500 mg total) by mouth every 12 (twelve) hours Do not start before December 31, 2023. (Patient not taking: Reported on 1/2/2024), Disp: 60 tablet, Rfl: 0    metoprolol succinate (TOPROL-XL) 25 mg 24 hr tablet, TAKE 1 TABLET BY MOUTH EVERY DAY, Disp: 30 tablet, Rfl: 8    multivitamin (THERAGRAN) TABS, Take 1 tablet by mouth daily. (Patient not taking: Reported on 1/2/2024), Disp: , Rfl:     nitroglycerin (NITROSTAT) 0.4 mg SL tablet, Place 1 tablet (0.4 mg total) under the tongue every 5 (five) minutes as needed for chest pain, Disp: 25 tablet, Rfl: 0    polyethylene glycol (MIRALAX) 17 g packet, Take 17 g by mouth daily as needed (Constipation) for up to 5 days, Disp: 85 g, Rfl: 0    prasugrel (EFFIENT) tablet, 1 tablet daily, Disp: 90 tablet, Rfl: 3    rivaroxaban (Xarelto) 15 mg tablet, Take 1 tablet (15 mg total) by mouth daily with breakfast, Disp: 90 tablet, Rfl: 3    senna (SENOKOT) 8.6 mg, Take 2 tablets (17.2 mg total) by mouth daily for 7 days Do not start before November 14, 2023., Disp: 14 tablet, Rfl: 0    sertraline (ZOLOFT) 25 mg tablet, TAKE 1 TABLET BY  MOUTH EVERY DAY, Disp: 90 tablet, Rfl: 1      Victor M Barton PA-C

## 2024-01-09 ENCOUNTER — HOME CARE VISIT (OUTPATIENT)
Dept: HOME HEALTH SERVICES | Facility: HOME HEALTHCARE | Age: 84
End: 2024-01-09
Payer: MEDICARE

## 2024-01-09 VITALS
OXYGEN SATURATION: 98 % | RESPIRATION RATE: 18 BRPM | DIASTOLIC BLOOD PRESSURE: 62 MMHG | HEART RATE: 78 BPM | SYSTOLIC BLOOD PRESSURE: 140 MMHG

## 2024-01-09 VITALS
HEART RATE: 78 BPM | SYSTOLIC BLOOD PRESSURE: 140 MMHG | DIASTOLIC BLOOD PRESSURE: 62 MMHG | OXYGEN SATURATION: 98 % | RESPIRATION RATE: 18 BRPM

## 2024-01-09 PROCEDURE — G0299 HHS/HOSPICE OF RN EA 15 MIN: HCPCS

## 2024-01-09 PROCEDURE — G0152 HHCP-SERV OF OT,EA 15 MIN: HCPCS

## 2024-01-10 ENCOUNTER — HOME CARE VISIT (OUTPATIENT)
Dept: HOME HEALTH SERVICES | Facility: HOME HEALTHCARE | Age: 84
End: 2024-01-10
Payer: MEDICARE

## 2024-01-10 VITALS — SYSTOLIC BLOOD PRESSURE: 132 MMHG | RESPIRATION RATE: 18 BRPM | DIASTOLIC BLOOD PRESSURE: 72 MMHG

## 2024-01-10 PROCEDURE — G0151 HHCP-SERV OF PT,EA 15 MIN: HCPCS

## 2024-01-11 ENCOUNTER — HOME CARE VISIT (OUTPATIENT)
Dept: HOME HEALTH SERVICES | Facility: HOME HEALTHCARE | Age: 84
End: 2024-01-11
Payer: MEDICARE

## 2024-01-11 VITALS
DIASTOLIC BLOOD PRESSURE: 70 MMHG | SYSTOLIC BLOOD PRESSURE: 138 MMHG | RESPIRATION RATE: 18 BRPM | HEART RATE: 71 BPM | OXYGEN SATURATION: 75 %

## 2024-01-11 VITALS
TEMPERATURE: 97.9 F | SYSTOLIC BLOOD PRESSURE: 118 MMHG | HEART RATE: 87 BPM | RESPIRATION RATE: 18 BRPM | DIASTOLIC BLOOD PRESSURE: 70 MMHG | OXYGEN SATURATION: 97 %

## 2024-01-11 PROCEDURE — G0152 HHCP-SERV OF OT,EA 15 MIN: HCPCS

## 2024-01-11 PROCEDURE — G0299 HHS/HOSPICE OF RN EA 15 MIN: HCPCS

## 2024-01-12 ENCOUNTER — HOME CARE VISIT (OUTPATIENT)
Dept: HOME HEALTH SERVICES | Facility: HOME HEALTHCARE | Age: 84
End: 2024-01-12
Payer: MEDICARE

## 2024-01-12 ENCOUNTER — PATIENT OUTREACH (OUTPATIENT)
Dept: CASE MANAGEMENT | Facility: OTHER | Age: 84
End: 2024-01-12

## 2024-01-12 PROCEDURE — G0151 HHCP-SERV OF PT,EA 15 MIN: HCPCS

## 2024-01-12 NOTE — PROGRESS NOTES
Spoke with , Leander as therapist is there now with Verito.  She is doing well.    Reminder given to make follow up appointment with Dr. Warner,      MEHRAN/Crt drawn 1/5 and are WNL.

## 2024-01-13 ENCOUNTER — HOME CARE VISIT (OUTPATIENT)
Dept: HOME HEALTH SERVICES | Facility: HOME HEALTHCARE | Age: 84
End: 2024-01-13
Payer: MEDICARE

## 2024-01-13 VITALS
DIASTOLIC BLOOD PRESSURE: 72 MMHG | OXYGEN SATURATION: 96 % | RESPIRATION RATE: 18 BRPM | SYSTOLIC BLOOD PRESSURE: 122 MMHG | HEART RATE: 78 BPM

## 2024-01-13 VITALS
TEMPERATURE: 97.2 F | DIASTOLIC BLOOD PRESSURE: 70 MMHG | HEART RATE: 78 BPM | RESPIRATION RATE: 18 BRPM | OXYGEN SATURATION: 96 % | SYSTOLIC BLOOD PRESSURE: 118 MMHG

## 2024-01-13 PROCEDURE — G0299 HHS/HOSPICE OF RN EA 15 MIN: HCPCS

## 2024-01-15 ENCOUNTER — HOME CARE VISIT (OUTPATIENT)
Dept: HOME HEALTH SERVICES | Facility: HOME HEALTHCARE | Age: 84
End: 2024-01-15
Payer: MEDICARE

## 2024-01-15 VITALS
RESPIRATION RATE: 18 BRPM | OXYGEN SATURATION: 99 % | HEART RATE: 84 BPM | DIASTOLIC BLOOD PRESSURE: 72 MMHG | SYSTOLIC BLOOD PRESSURE: 140 MMHG | TEMPERATURE: 96.8 F

## 2024-01-15 PROCEDURE — G0151 HHCP-SERV OF PT,EA 15 MIN: HCPCS

## 2024-01-15 PROCEDURE — G0299 HHS/HOSPICE OF RN EA 15 MIN: HCPCS

## 2024-01-16 ENCOUNTER — HOME CARE VISIT (OUTPATIENT)
Dept: HOME HEALTH SERVICES | Facility: HOME HEALTHCARE | Age: 84
End: 2024-01-16
Payer: MEDICARE

## 2024-01-16 VITALS
HEART RATE: 84 BPM | SYSTOLIC BLOOD PRESSURE: 140 MMHG | RESPIRATION RATE: 18 BRPM | OXYGEN SATURATION: 99 % | DIASTOLIC BLOOD PRESSURE: 72 MMHG

## 2024-01-16 VITALS
OXYGEN SATURATION: 99 % | SYSTOLIC BLOOD PRESSURE: 130 MMHG | DIASTOLIC BLOOD PRESSURE: 75 MMHG | RESPIRATION RATE: 18 BRPM | HEART RATE: 56 BPM

## 2024-01-16 PROCEDURE — G0152 HHCP-SERV OF OT,EA 15 MIN: HCPCS

## 2024-01-17 ENCOUNTER — HOME CARE VISIT (OUTPATIENT)
Dept: HOME HEALTH SERVICES | Facility: HOME HEALTHCARE | Age: 84
End: 2024-01-17
Payer: MEDICARE

## 2024-01-17 VITALS
OXYGEN SATURATION: 98 % | HEART RATE: 79 BPM | TEMPERATURE: 98.4 F | DIASTOLIC BLOOD PRESSURE: 78 MMHG | RESPIRATION RATE: 18 BRPM | SYSTOLIC BLOOD PRESSURE: 122 MMHG

## 2024-01-17 PROCEDURE — 10330064 TAPE, ADHSV TRANSPORE WHT 2" (6RL/BX 10B

## 2024-01-17 PROCEDURE — 10330064 DRESSING, OIL EMULSION 3"X3" STR (50/BX

## 2024-01-17 PROCEDURE — 10330064 DRESSING, CALCIUM ALGINATE AG SHEET 4"X4

## 2024-01-17 PROCEDURE — G0299 HHS/HOSPICE OF RN EA 15 MIN: HCPCS

## 2024-01-17 PROCEDURE — 10330064 SPONGE, GAUZE 8PLY N/S 4"X4" (200/PK 20P

## 2024-01-17 PROCEDURE — G0151 HHCP-SERV OF PT,EA 15 MIN: HCPCS

## 2024-01-17 PROCEDURE — 10330064 PAD, ABD 5X9" STR LF (1/PK 20PK/BX) MGM1

## 2024-01-17 PROCEDURE — 10330064 BANDAGE, CNFRM 4"X4.1YDS N/S LF (12RL/BG

## 2024-01-18 ENCOUNTER — HOME CARE VISIT (OUTPATIENT)
Dept: HOME HEALTH SERVICES | Facility: HOME HEALTHCARE | Age: 84
End: 2024-01-18
Payer: MEDICARE

## 2024-01-18 VITALS
SYSTOLIC BLOOD PRESSURE: 136 MMHG | HEART RATE: 65 BPM | OXYGEN SATURATION: 97 % | RESPIRATION RATE: 18 BRPM | DIASTOLIC BLOOD PRESSURE: 78 MMHG

## 2024-01-18 PROCEDURE — G0152 HHCP-SERV OF OT,EA 15 MIN: HCPCS

## 2024-01-19 ENCOUNTER — PATIENT OUTREACH (OUTPATIENT)
Dept: CASE MANAGEMENT | Facility: OTHER | Age: 84
End: 2024-01-19

## 2024-01-19 ENCOUNTER — HOME CARE VISIT (OUTPATIENT)
Dept: HOME HEALTH SERVICES | Facility: HOME HEALTHCARE | Age: 84
End: 2024-01-19
Payer: MEDICARE

## 2024-01-19 PROCEDURE — G0300 HHS/HOSPICE OF LPN EA 15 MIN: HCPCS

## 2024-01-19 NOTE — PROGRESS NOTES
"Verito states that she is \"doing okay and therapy is going good\".   She has denies any needs at this time.    "

## 2024-01-20 VITALS
OXYGEN SATURATION: 96 % | TEMPERATURE: 97.8 F | RESPIRATION RATE: 18 BRPM | DIASTOLIC BLOOD PRESSURE: 62 MMHG | SYSTOLIC BLOOD PRESSURE: 130 MMHG | HEART RATE: 76 BPM

## 2024-01-20 DIAGNOSIS — R09.82 POST-NASAL DRIP: ICD-10-CM

## 2024-01-20 DIAGNOSIS — J34.89 RHINORRHEA: ICD-10-CM

## 2024-01-22 ENCOUNTER — HOME CARE VISIT (OUTPATIENT)
Dept: HOME HEALTH SERVICES | Facility: HOME HEALTHCARE | Age: 84
End: 2024-01-22
Payer: MEDICARE

## 2024-01-22 VITALS
HEART RATE: 75 BPM | OXYGEN SATURATION: 97 % | DIASTOLIC BLOOD PRESSURE: 68 MMHG | SYSTOLIC BLOOD PRESSURE: 132 MMHG | TEMPERATURE: 97 F | RESPIRATION RATE: 17 BRPM

## 2024-01-22 VITALS
RESPIRATION RATE: 18 BRPM | SYSTOLIC BLOOD PRESSURE: 132 MMHG | OXYGEN SATURATION: 98 % | HEART RATE: 95 BPM | DIASTOLIC BLOOD PRESSURE: 78 MMHG

## 2024-01-22 PROCEDURE — G0151 HHCP-SERV OF PT,EA 15 MIN: HCPCS

## 2024-01-22 PROCEDURE — G0299 HHS/HOSPICE OF RN EA 15 MIN: HCPCS

## 2024-01-22 RX ORDER — IPRATROPIUM BROMIDE 21 UG/1
2 SPRAY, METERED NASAL EVERY 12 HOURS
Qty: 30 ML | Refills: 1 | Status: SHIPPED | OUTPATIENT
Start: 2024-01-22 | End: 2024-01-30

## 2024-01-24 ENCOUNTER — RA CDI HCC (OUTPATIENT)
Dept: OTHER | Facility: HOSPITAL | Age: 84
End: 2024-01-24

## 2024-01-24 ENCOUNTER — HOME CARE VISIT (OUTPATIENT)
Dept: HOME HEALTH SERVICES | Facility: HOME HEALTHCARE | Age: 84
End: 2024-01-24
Payer: MEDICARE

## 2024-01-24 VITALS
HEART RATE: 81 BPM | SYSTOLIC BLOOD PRESSURE: 122 MMHG | TEMPERATURE: 96.6 F | DIASTOLIC BLOOD PRESSURE: 78 MMHG | RESPIRATION RATE: 18 BRPM | OXYGEN SATURATION: 98 %

## 2024-01-24 PROCEDURE — G0299 HHS/HOSPICE OF RN EA 15 MIN: HCPCS

## 2024-01-24 NOTE — PROGRESS NOTES
HCC coding opportunities          Chart Reviewed number of suggestions sent to Provider: 5     Patients Insurance   I13.0, I49.5, I73.9, I27.20 and I70.219  Medicare Insurance: Medicare

## 2024-01-25 ENCOUNTER — REMOTE DEVICE CLINIC VISIT (OUTPATIENT)
Dept: CARDIOLOGY CLINIC | Facility: CLINIC | Age: 84
End: 2024-01-25
Payer: MEDICARE

## 2024-01-25 DIAGNOSIS — Z95.0 CARDIAC PACEMAKER IN SITU: Primary | ICD-10-CM

## 2024-01-25 PROCEDURE — 93294 REM INTERROG EVL PM/LDLS PM: CPT | Performed by: STUDENT IN AN ORGANIZED HEALTH CARE EDUCATION/TRAINING PROGRAM

## 2024-01-25 PROCEDURE — 93296 REM INTERROG EVL PM/IDS: CPT | Performed by: STUDENT IN AN ORGANIZED HEALTH CARE EDUCATION/TRAINING PROGRAM

## 2024-01-25 NOTE — PROGRESS NOTES
"Results for orders placed or performed in visit on 01/25/24   Cardiac EP device report    Narrative    MDT-DUAL CHAMBER PPM (DDDR MODE)/ACTIVE SYSTEM IS MRI CONDITIONAL  CARELINK TRANSMISSION: BATTERY STATUS \"7 YRS.\" AP 92%  100%. ALL AVAILABLE LEAD PARAMETERS WITHIN NORMAL LIMITS. 8 AT/AF NOTED; 0% BURDEN; SAME DAY; NO AFIB. AVAIL EGRAMS PRESENT AS FF OVERSENSING. NORMAL DEVICE FUNCTION. NC         "

## 2024-01-26 ENCOUNTER — PATIENT OUTREACH (OUTPATIENT)
Dept: CASE MANAGEMENT | Facility: OTHER | Age: 84
End: 2024-01-26

## 2024-01-26 ENCOUNTER — HOME CARE VISIT (OUTPATIENT)
Dept: HOME HEALTH SERVICES | Facility: HOME HEALTHCARE | Age: 84
End: 2024-01-26
Payer: MEDICARE

## 2024-01-26 VITALS
TEMPERATURE: 96.8 F | SYSTOLIC BLOOD PRESSURE: 142 MMHG | RESPIRATION RATE: 18 BRPM | OXYGEN SATURATION: 97 % | DIASTOLIC BLOOD PRESSURE: 80 MMHG | HEART RATE: 91 BPM

## 2024-01-26 PROCEDURE — G0299 HHS/HOSPICE OF RN EA 15 MIN: HCPCS

## 2024-01-26 NOTE — PROGRESS NOTES
Note on phone that person unavailable.  Could not leave message.        Continues to have home health visits.

## 2024-01-28 ENCOUNTER — HOME CARE VISIT (OUTPATIENT)
Dept: HOME HEALTH SERVICES | Facility: HOME HEALTHCARE | Age: 84
End: 2024-01-28
Payer: MEDICARE

## 2024-01-28 VITALS
TEMPERATURE: 97.3 F | DIASTOLIC BLOOD PRESSURE: 70 MMHG | RESPIRATION RATE: 18 BRPM | HEART RATE: 92 BPM | SYSTOLIC BLOOD PRESSURE: 135 MMHG | OXYGEN SATURATION: 97 %

## 2024-01-28 PROCEDURE — G0299 HHS/HOSPICE OF RN EA 15 MIN: HCPCS

## 2024-01-30 ENCOUNTER — OFFICE VISIT (OUTPATIENT)
Dept: FAMILY MEDICINE CLINIC | Facility: MEDICAL CENTER | Age: 84
End: 2024-01-30
Payer: MEDICARE

## 2024-01-30 ENCOUNTER — HOME CARE VISIT (OUTPATIENT)
Dept: HOME HEALTH SERVICES | Facility: HOME HEALTHCARE | Age: 84
End: 2024-01-30
Payer: MEDICARE

## 2024-01-30 VITALS
HEART RATE: 86 BPM | BODY MASS INDEX: 26.41 KG/M2 | WEIGHT: 131 LBS | HEIGHT: 59 IN | TEMPERATURE: 97.1 F | OXYGEN SATURATION: 96 % | DIASTOLIC BLOOD PRESSURE: 68 MMHG | SYSTOLIC BLOOD PRESSURE: 122 MMHG

## 2024-01-30 VITALS
SYSTOLIC BLOOD PRESSURE: 130 MMHG | TEMPERATURE: 97 F | DIASTOLIC BLOOD PRESSURE: 74 MMHG | OXYGEN SATURATION: 99 % | RESPIRATION RATE: 17 BRPM | HEART RATE: 79 BPM

## 2024-01-30 DIAGNOSIS — Z09 HOSPITAL DISCHARGE FOLLOW-UP: Primary | ICD-10-CM

## 2024-01-30 DIAGNOSIS — Z00.00 HEALTHCARE MAINTENANCE: ICD-10-CM

## 2024-01-30 DIAGNOSIS — R56.9 SEIZURE-LIKE ACTIVITY (HCC): ICD-10-CM

## 2024-01-30 DIAGNOSIS — H61.21 IMPACTED CERUMEN, RIGHT EAR: ICD-10-CM

## 2024-01-30 DIAGNOSIS — S81.801D WOUND OF RIGHT LOWER EXTREMITY, SUBSEQUENT ENCOUNTER: ICD-10-CM

## 2024-01-30 PROCEDURE — 99213 OFFICE O/P EST LOW 20 MIN: CPT | Performed by: STUDENT IN AN ORGANIZED HEALTH CARE EDUCATION/TRAINING PROGRAM

## 2024-01-30 PROCEDURE — G0299 HHS/HOSPICE OF RN EA 15 MIN: HCPCS

## 2024-01-30 NOTE — PROGRESS NOTES
[facility] - Clinic Note  Robbie Warner DO, 24     Verito Fallon MRN: [id number] : 1940 Age: 83 y.o.     Assessment/Plan     1. Hospital discharge follow-up    -Patient presents for hospital follow-up after hospitalization for right lower extremity hematoma, underwent debridement in OR, was discharged to STR  -VNA services in place, assisting with dressing  -Wound VAC has since been removed  -Follow-up with trauma surgery 2024    2. Wound of right lower extremity, subsequent encounter    -VNA services in place, assisting with dressing  -Wound VAC has since been removed  -Follow-up with trauma surgery 2024    3. Impacted cerumen, right ear    - carbamide peroxide (DEBROX) 6.5 % otic solution; Administer 5 drops to the right ear 2 (two) times a day for 4 days  Dispense: 15 mL; Refill: 0  -Return to office in about 1 week for ear flush    4. Seizure-like activity (HCC)    -Was following previously with Dr. Batres, advised about follow-up  - Ambulatory Referral to Neurology; Future    5. Healthcare maintenance    -Declines mammogram    Verito Fallon acknowledged understanding of treatment plan, all questions answered.    Subjective      Verito Fallon is a 83 y.o. female who presents for hospital follow-up.  Patient was hospitalized at [facility] from December 15, 2023 to 2023 for right lower extremity hematoma.  Patient underwent debridement during hospital stay.  She was discharged to short-term rehab.  She currently has VNA services testing with dressing.  Patient presents today with her .  Patient feels well today.  She denies any fevers or chills.  Patient has since had VAC removed.    Summary of Hospital Course: 82 y/o female presented with a right lower leg hematoma.  Is on Xaraleto at home, history of A-fib.Seen in clinic and transferred to ED for care.  Was admitted and taken to OR for debridement.  She received a STSG  last evening with a VAC in place.  REady for discharge to rehab, remains NWB with vac.  Discussed plan with Grand-daughter and answered questions.  They are in favor of her going to rehab today.  Will follow up with Surgery on Thursday the 21st of December for VAC removal.  Will also follow up with PCP.  For more details please refer to medical records.       The following portions of the patient's history were reviewed and updated as appropriate: allergies, current medications, past family history, past medical history, past social history, past surgical history and problem list.     Past Medical History:   Diagnosis Date    Anal fissure     Cardiac disorder     Cognitive changes 12/23/2020    Esophageal reflux 12/15/2023    Esophagitis, reflux     Hemorrhoids     Hepatic hemangioma     Last Assessed: 1/13/2015    Herpes zoster     History of colonic polyps     Hypertension     Ischemic colitis (HCC)     Lumbar herniated disc     Malignant neoplasm without specification of site (HCC)     Nephrolithiasis     L. Lithotripsy    Nontoxic single thyroid nodule     Last Assessed: 1/13/2015    Osteoarthritis     Overactive bladder     Raynaud disease     Respiratory system disease     Sjogren's disease (HCC)     Spinal stenosis     PONCHO (stress urinary incontinence, female)     Uterovaginal prolapse     Grade I-II       Allergies   Allergen Reactions    Sulfa Antibiotics Anaphylaxis    Formaldehyde     Codeine Palpitations       Past Surgical History:   Procedure Laterality Date    APPENDECTOMY  1947    CARDIAC PACEMAKER PLACEMENT  01/2021    CARDIAC SURGERY      CABG    CATARACT EXTRACTION Bilateral     COLONOSCOPY  2012    Fiberoptic    COLONOSCOPY      Resolved: 2006 - 2012 5 year f/u    CORONARY ANGIOPLASTY WITH STENT PLACEMENT      CORONARY ARTERY BYPASS GRAFT      Resolved: 2012    ESOPHAGOGASTRODUODENOSCOPY  2012    Diagnostic    HEMORROIDECTOMY      KNEE SURGERY      LITHOTRIPSY      Renal    MALIGNANT SKIN LESION  EXCISION      Face; Resolved: 2004    NM ESOPHAGOGASTRODUODENOSCOPY TRANSORAL DIAGNOSTIC N/A 4/13/2016    Procedure: EGD AND COLONOSCOPY;  Surgeon: Evelin Bone MD;  [facility];  Service: Gastroenterology    RENAL ARTERY STENT      SKIN LESION EXCISION      Scalp    SOFT TISSUE TUMOR RESECTION      Shoulder; Resolved: 1995    SPLIT THICKNESS SKIN GRAFT Right 12/14/2023    Procedure: SKIN GRAFT SPLIT THICKNESS (STSG)  EXTREMITY; VAC application;  Surgeon: Ap Brito DO;  [facility];  Service: General    THROMBOLYSIS      Postoperative Thrombolysis PTCA    TONSILLECTOMY      Resolved: 1944    WOUND DEBRIDEMENT Right 12/8/2023    Procedure: DEBRIDEMENT LOWER EXTREMITY (WASH OUT); POSSIBLE VAC APPLICATION;  Surgeon: Abhijeet Davies MD;  [facility];  Service: General       Family History   Problem Relation Age of Onset    Heart disease Mother     Hypertension Mother     Osteoporosis Mother     Heart disease Father     Hypertension Father     No Known Problems Sister     Heart disease Brother     Diabetes Brother     No Known Problems Daughter     No Known Problems Son     No Known Problems Maternal Grandmother     No Known Problems Maternal Grandfather     No Known Problems Paternal Grandmother     No Known Problems Paternal Grandfather     Ulcerative colitis Family     Colon polyps Family     Breast cancer Neg Hx     Colon cancer Neg Hx     Ovarian cancer Neg Hx        Social History     Socioeconomic History    Marital status: /Civil Union     Spouse name: None    Number of children: None    Years of education: None    Highest education level: None   Occupational History    Occupation: retired   Tobacco Use    Smoking status: Never    Smokeless tobacco: Never   Vaping Use    Vaping status: Never Used   Substance and Sexual Activity    Alcohol use: Yes     Alcohol/week: 1.0 standard drink of alcohol     Types: 1 Glasses of wine per week     Comment: occasionally, but no  alcohol intake recently    Drug use: Never    Sexual activity: Not Currently   Other Topics Concern    None   Social History Narrative    Activities: golf    Caffeine use    Consumes healthy diet    Daily caffeinated coffee consumption: 1 cup per day    Drinks caffeinated tea: 1 cup per day    Well balanced diet     Social Determinants of Health     Financial Resource Strain: Low Risk  (7/18/2023)    Overall Financial Resource Strain (CARDIA)     Difficulty of Paying Living Expenses: Not hard at all   Food Insecurity: No Food Insecurity (12/8/2023)    Hunger Vital Sign     Worried About Running Out of Food in the Last Year: Never true     Ran Out of Food in the Last Year: Never true   Transportation Needs: No Transportation Needs (12/8/2023)    PRAPARE - Transportation     Lack of Transportation (Medical): No     Lack of Transportation (Non-Medical): No   Physical Activity: Not on file   Stress: Not on file   Social Connections: Not on file   Intimate Partner Violence: Not on file   Housing Stability: Low Risk  (12/8/2023)    Housing Stability Vital Sign     Unable to Pay for Housing in the Last Year: No     Number of Places Lived in the Last Year: 1     Unstable Housing in the Last Year: No       Current Outpatient Medications   Medication Sig Dispense Refill    acetaminophen (TYLENOL) 325 mg tablet Take 2 tablets (650 mg total) by mouth every 4 (four) hours as needed for mild pain  0    amiodarone 200 mg tablet TAKE 1/2 TABLET (100 MG TOTAL) BY MOUTH DAILY WITH BREAKFAST 15 tablet 8    atorvastatin (LIPITOR) 20 mg tablet TAKE 1 TABLET BY MOUTH EVERY DAY WITH DINNER 90 tablet 4    bumetanide (BUMEX) 2 mg tablet TAKE 1 TABLET BY MOUTH EVERY DAY 30 tablet 8    metoprolol succinate (TOPROL-XL) 25 mg 24 hr tablet TAKE 1 TABLET BY MOUTH EVERY DAY 30 tablet 8    nitroglycerin (NITROSTAT) 0.4 mg SL tablet Place 1 tablet (0.4 mg total) under the tongue every 5 (five) minutes as needed for chest pain 25 tablet 0     "prasugrel (EFFIENT) tablet 1 tablet daily 90 tablet 3    rivaroxaban (Xarelto) 15 mg tablet Take 1 tablet (15 mg total) by mouth daily with breakfast 90 tablet 3    sertraline (ZOLOFT) 25 mg tablet TAKE 1 TABLET BY MOUTH EVERY DAY 90 tablet 1    levETIRAcetam (Keppra) 500 mg tablet Take 1 tablet (500 mg total) by mouth every 12 (twelve) hours Do not start before December 31, 2023. (Patient not taking: Reported on 1/2/2024) 60 tablet 0    multivitamin (THERAGRAN) TABS Take 1 tablet by mouth daily. (Patient not taking: Reported on 1/2/2024)      polyethylene glycol (MIRALAX) 17 g packet Take 17 g by mouth daily as needed (Constipation) for up to 5 days 85 g 0    senna (SENOKOT) 8.6 mg Take 2 tablets (17.2 mg total) by mouth daily for 7 days Do not start before November 14, 2023. 14 tablet 0     No current facility-administered medications for this visit.       Review of Systems     As noted in HPI    Objective      /68 (BP Location: Left arm, Patient Position: Sitting, Cuff Size: Standard)   Pulse 86   Temp (!) 97.1 °F (36.2 °C) (Skin)   Ht 4' 11\" (1.499 m)   Wt 59.4 kg (131 lb)   SpO2 96%   BMI 26.46 kg/m²     Physical Exam  Vitals reviewed.   Constitutional:       General: She is not in acute distress.     Appearance: Normal appearance.   HENT:      Head: Normocephalic and atraumatic.      Right Ear: There is impacted cerumen.      Left Ear: Tympanic membrane, ear canal and external ear normal.      Nose: Nose normal.      Mouth/Throat:      Mouth: Mucous membranes are moist.      Pharynx: Oropharynx is clear.   Eyes:      Extraocular Movements: Extraocular movements intact.      Conjunctiva/sclera: Conjunctivae normal.      Pupils: Pupils are equal, round, and reactive to light.   Cardiovascular:      Rate and Rhythm: Normal rate and regular rhythm.      Pulses: Normal pulses.      Heart sounds: Normal heart sounds.   Pulmonary:      Effort: Pulmonary effort is normal.      Breath sounds: Normal breath " "sounds.   Abdominal:      General: Abdomen is flat. Bowel sounds are normal.      Palpations: Abdomen is soft.      Tenderness: There is no abdominal tenderness.   Musculoskeletal:      Cervical back: Neck supple.      Right lower leg: Right lower leg edema: scant.      Left lower leg: Left lower leg edema: scant.   Lymphadenopathy:      Cervical: No cervical adenopathy.   Skin:     General: Skin is warm and dry.      Capillary Refill: Capillary refill takes less than 2 seconds.      Comments: Right lower extremity dressing in place, clean and dry, no active drainage or bleeding   Neurological:      Mental Status: She is alert and oriented to person, place, and time.   Psychiatric:         Mood and Affect: Mood normal.         Behavior: Behavior normal.         Thought Content: Thought content normal.             Some portions of this record may have been generated with voice recognition software. There may be translation, syntax, or grammatical errors. Occasional wrong word or \"sound-a-like\" substitutions may have occurred due to the inherent limitations of the voice recognition software. Read the chart carefully and recognize, using context, where substations may have occurred. If you have any questions, please contact the dictating provider for clarification or correction, as needed.  "

## 2024-01-31 ENCOUNTER — PATIENT OUTREACH (OUTPATIENT)
Dept: CASE MANAGEMENT | Facility: OTHER | Age: 84
End: 2024-01-31

## 2024-01-31 NOTE — PROGRESS NOTES
Spoke with SO, Leander.  Verito is doing okay.   He said she has one more visit with the home health nurse.  She has a visit with PCP yesterday.      [employer] completed.

## 2024-02-01 ENCOUNTER — HOME CARE VISIT (OUTPATIENT)
Dept: HOME HEALTH SERVICES | Facility: HOME HEALTHCARE | Age: 84
End: 2024-02-01
Payer: MEDICARE

## 2024-02-01 VITALS
HEART RATE: 73 BPM | SYSTOLIC BLOOD PRESSURE: 158 MMHG | RESPIRATION RATE: 18 BRPM | DIASTOLIC BLOOD PRESSURE: 78 MMHG | OXYGEN SATURATION: 98 % | TEMPERATURE: 96.8 F

## 2024-02-01 PROCEDURE — G0299 HHS/HOSPICE OF RN EA 15 MIN: HCPCS

## 2024-02-17 DIAGNOSIS — I48.91 A-FIB (HCC): ICD-10-CM

## 2024-02-19 RX ORDER — METOPROLOL SUCCINATE 25 MG/1
TABLET, EXTENDED RELEASE ORAL
Qty: 30 TABLET | Refills: 8 | Status: SHIPPED | OUTPATIENT
Start: 2024-02-19

## 2024-02-21 PROBLEM — J10.1 INFLUENZA A: Status: RESOLVED | Noted: 2023-12-26 | Resolved: 2024-02-21

## 2024-02-26 ENCOUNTER — HOSPITAL ENCOUNTER (OUTPATIENT)
Dept: NON INVASIVE DIAGNOSTICS | Facility: CLINIC | Age: 84
Discharge: HOME | End: 2024-02-26
Payer: MEDICARE

## 2024-02-26 DIAGNOSIS — I65.23 CAROTID STENOSIS, ASYMPTOMATIC, BILATERAL: ICD-10-CM

## 2024-02-26 PROCEDURE — 93880 EXTRACRANIAL BILAT STUDY: CPT

## 2024-02-26 PROCEDURE — 93880 EXTRACRANIAL BILAT STUDY: CPT | Performed by: SURGERY

## 2024-02-27 ENCOUNTER — OFFICE VISIT (OUTPATIENT)
Dept: FAMILY MEDICINE CLINIC | Facility: MEDICAL CENTER | Age: 84
End: 2024-02-27
Payer: MEDICARE

## 2024-02-27 VITALS
HEART RATE: 79 BPM | HEIGHT: 59 IN | SYSTOLIC BLOOD PRESSURE: 118 MMHG | DIASTOLIC BLOOD PRESSURE: 80 MMHG | TEMPERATURE: 97.2 F | WEIGHT: 133 LBS | OXYGEN SATURATION: 93 % | BODY MASS INDEX: 26.81 KG/M2

## 2024-02-27 DIAGNOSIS — I48.0 PAROXYSMAL ATRIAL FIBRILLATION (HCC): ICD-10-CM

## 2024-02-27 DIAGNOSIS — F32.89 OTHER DEPRESSION: ICD-10-CM

## 2024-02-27 DIAGNOSIS — Z23 ENCOUNTER FOR IMMUNIZATION: ICD-10-CM

## 2024-02-27 DIAGNOSIS — I73.9 PAD (PERIPHERAL ARTERY DISEASE) (HCC): ICD-10-CM

## 2024-02-27 DIAGNOSIS — N18.30 STAGE 3 CHRONIC KIDNEY DISEASE, UNSPECIFIED WHETHER STAGE 3A OR 3B CKD (HCC): ICD-10-CM

## 2024-02-27 DIAGNOSIS — Z09 FOLLOW-UP EXAM, 3-6 MONTHS SINCE PREVIOUS EXAM: Primary | ICD-10-CM

## 2024-02-27 DIAGNOSIS — R41.89 COGNITIVE IMPAIRMENT: ICD-10-CM

## 2024-02-27 DIAGNOSIS — M81.0 AGE-RELATED OSTEOPOROSIS WITHOUT CURRENT PATHOLOGICAL FRACTURE: ICD-10-CM

## 2024-02-27 DIAGNOSIS — R56.9 SEIZURE-LIKE ACTIVITY (HCC): ICD-10-CM

## 2024-02-27 DIAGNOSIS — I49.5 SICK SINUS SYNDROME (HCC): ICD-10-CM

## 2024-02-27 DIAGNOSIS — E87.6 LOW BLOOD POTASSIUM: ICD-10-CM

## 2024-02-27 DIAGNOSIS — I27.20 PULMONARY HYPERTENSION (HCC): ICD-10-CM

## 2024-02-27 DIAGNOSIS — I65.23 CAROTID STENOSIS, ASYMPTOMATIC, BILATERAL: ICD-10-CM

## 2024-02-27 DIAGNOSIS — I10 ESSENTIAL HYPERTENSION: Chronic | ICD-10-CM

## 2024-02-27 DIAGNOSIS — I25.810 CORONARY ARTERY DISEASE INVOLVING OTHER CORONARY ARTERY BYPASS GRAFT WITHOUT ANGINA PECTORIS: ICD-10-CM

## 2024-02-27 DIAGNOSIS — E78.2 MIXED HYPERLIPIDEMIA: ICD-10-CM

## 2024-02-27 DIAGNOSIS — I50.42 CHRONIC COMBINED SYSTOLIC AND DIASTOLIC CONGESTIVE HEART FAILURE (HCC): ICD-10-CM

## 2024-02-27 DIAGNOSIS — I70.1 RENAL ARTERY STENOSIS (HCC): ICD-10-CM

## 2024-02-27 PROBLEM — Z95.0 PRESENCE OF PERMANENT CARDIAC PACEMAKER: Status: ACTIVE | Noted: 2024-02-27

## 2024-02-27 PROBLEM — K21.9 ESOPHAGEAL REFLUX: Status: RESOLVED | Noted: 2023-12-15 | Resolved: 2024-02-27

## 2024-02-27 PROCEDURE — 99214 OFFICE O/P EST MOD 30 MIN: CPT | Performed by: STUDENT IN AN ORGANIZED HEALTH CARE EDUCATION/TRAINING PROGRAM

## 2024-02-27 PROCEDURE — 90677 PCV20 VACCINE IM: CPT

## 2024-02-27 PROCEDURE — G0009 ADMIN PNEUMOCOCCAL VACCINE: HCPCS

## 2024-02-27 NOTE — PROGRESS NOTES
[facility] - Clinic Note  Robbie Alana, DO, 24     Verito Fallon MRN: [id number] : 1940 Age: 83 y.o.     Assessment/Plan     1. Follow-up exam, 3-6 months since previous exam    -Presents for medical follow-up  -She will return for AWV when due and follow-up sooner as needed    2. Paroxysmal atrial fibrillation (HCC)    -Overdue for follow-up with Cardiology, advised to schedule  -Rate and rhythm controlled  -Beta-blocker, amiodarone  -Anticoagulated with Xarelto    3. Coronary artery disease involving other coronary artery bypass graft without angina pectoris    -Hypertension under adequate control with current regimen  -Continue daily statin  -Overdue for follow-up with Cardiology    4. Chronic combined systolic and diastolic congestive heart failure (HCC)    -No signs or symptoms of volume overload  -Bumex 2 mg p.o. daily  -Most recent BMP reviewed    5. Essential hypertension    -Stable with current regimen    6. Carotid stenosis, asymptomatic, bilateral    -Following with vascular surgery  -Had carotid ultrasound 2024    7. PAD (peripheral artery disease) (HCC)    -Following with vascular surgery  -Hypertension under adequate control with current regimen  -Continue daily statin    8. Pulmonary hypertension (HCC)    -f/u Cardiology     9. Renal artery stenosis (HCC)    -Following with vascular surgery    10. Other depression    -Stable with Zoloft at current dose    11. Mixed hyperlipidemia    -Continue Lipitor 20 mg p.o. nightly  - Lipid Panel with Direct LDL reflex; Future    12. Encounter for immunization    -Counseled about PCV 20 vaccine, agreeable to receiving today  - Pneumococcal Conjugate Vaccine 20-valent (Pcv20)    13. Stage 3 chronic kidney disease, unspecified whether stage 3a or 3b CKD (HCC)    - I have reviewed pertinent labs:  BMP:   Lab Results   Component Value Date    SODIUM 140 2024    K 3.3 (L) 2024     2024    CO2 29 2024     AGAP 11 01/05/2024    BUN 13 01/05/2024    CREATININE 0.98 01/05/2024    GLUC 72 12/29/2023    GLUF 99 01/05/2024    CALCIUM 9.5 01/05/2024    EGFR 53 01/05/2024       14. Age-related osteoporosis without current pathological fracture    -Due for repeat DXA scan December 2024  -Patient does not wish to pursue pharmacotherapy at this time    15. Low blood potassium    -Follow-up repeat BMP  - Basic metabolic panel; Future    16. Sick sinus syndrome (HCC)    -Pacemaker, overdue for follow-up with Cardiology    17. Seizure-like activity (HCC)    -Patient used to follow with Dr. Batres, again reminded to schedule follow-up    18. Cognitive impairment    -Patient used to follow with Dr. Batres, again reminded to schedule follow-up    Verito Fallon acknowledged understanding of treatment plan, all questions answered.    Subjective      Verito Fallon is a 83 y.o. female who presents for medical follow-up.  She presents with her  who also contributes to history.  Patient feels well today.  She denies any chest pain, shortness of breath or palpitations.  She describes that her bowel movements are regular.  Her and her  are happy with how her lower extremity wound healed.    The following portions of the patient's history were reviewed and updated as appropriate: allergies, current medications, past family history, past medical history, past social history, past surgical history and problem list.     Past Medical History:   Diagnosis Date    Anal fissure     Cardiac disorder     Cognitive changes 12/23/2020    Esophageal reflux 12/15/2023    Esophagitis, reflux     Hemorrhoids     Hepatic hemangioma     Last Assessed: 1/13/2015    Herpes zoster     History of colonic polyps     Hypertension     Ischemic colitis (HCC)     Lumbar herniated disc     Malignant neoplasm without specification of site (HCC)     Nephrolithiasis     L. Lithotripsy    Nontoxic single thyroid nodule     Last Assessed: 1/13/2015     Osteoarthritis     Overactive bladder     Raynaud disease     Respiratory system disease     Sjogren's disease (HCC)     Spinal stenosis     PONCHO (stress urinary incontinence, female)     Uterovaginal prolapse     Grade I-II       Allergies   Allergen Reactions    Sulfa Antibiotics Anaphylaxis    Formaldehyde     Codeine Palpitations       Past Surgical History:   Procedure Laterality Date    APPENDECTOMY  1947    CARDIAC PACEMAKER PLACEMENT  01/2021    CARDIAC SURGERY      CABG    CATARACT EXTRACTION Bilateral     COLONOSCOPY  2012    Fiberoptic    COLONOSCOPY      Resolved: 2006 - 2012 5 year f/u    CORONARY ANGIOPLASTY WITH STENT PLACEMENT      CORONARY ARTERY BYPASS GRAFT      Resolved: 2012    ESOPHAGOGASTRODUODENOSCOPY  2012    Diagnostic    HEMORROIDECTOMY      KNEE SURGERY      LITHOTRIPSY      Renal    MALIGNANT SKIN LESION EXCISION      Face; Resolved: 2004    NE ESOPHAGOGASTRODUODENOSCOPY TRANSORAL DIAGNOSTIC N/A 4/13/2016    Procedure: EGD AND COLONOSCOPY;  Surgeon: Evelin Bone MD;  Location: [facility];  Service: Gastroenterology    RENAL ARTERY STENT      SKIN LESION EXCISION      Scalp    SOFT TISSUE TUMOR RESECTION      Shoulder; Resolved: 1995    SPLIT THICKNESS SKIN GRAFT Right 12/14/2023    Procedure: SKIN GRAFT SPLIT THICKNESS (STSG)  EXTREMITY; VAC application;  Surgeon: Ap Brito DO;  Location: [facility];  Service: General    THROMBOLYSIS      Postoperative Thrombolysis PTCA    TONSILLECTOMY      Resolved: 1944    WOUND DEBRIDEMENT Right 12/8/2023    Procedure: DEBRIDEMENT LOWER EXTREMITY (WASH OUT); POSSIBLE VAC APPLICATION;  Surgeon: Abhijeet Davies MD;  Location: [facility];  Service: General       Family History   Problem Relation Age of Onset    Heart disease Mother     Hypertension Mother     Osteoporosis Mother     Heart disease Father     Hypertension Father     No Known Problems Sister     Heart disease Brother     Diabetes Brother     No Known Problems Daughter     No  Known Problems Son     No Known Problems Maternal Grandmother     No Known Problems Maternal Grandfather     No Known Problems Paternal Grandmother     No Known Problems Paternal Grandfather     Ulcerative colitis Family     Colon polyps Family     Breast cancer Neg Hx     Colon cancer Neg Hx     Ovarian cancer Neg Hx        Social History     Socioeconomic History    Marital status: /Civil Union     Spouse name: None    Number of children: None    Years of education: None    Highest education level: None   Occupational History    Occupation: retired   Tobacco Use    Smoking status: Never    Smokeless tobacco: Never   Vaping Use    Vaping status: Never Used   Substance and Sexual Activity    Alcohol use: Yes     Alcohol/week: 1.0 standard drink of alcohol     Types: 1 Glasses of wine per week     Comment: occasionally, but no alcohol intake recently    Drug use: Never    Sexual activity: Not Currently   Other Topics Concern    None   Social History Narrative    Activities: golf    Caffeine use    Consumes healthy diet    Daily caffeinated coffee consumption: 1 cup per day    Drinks caffeinated tea: 1 cup per day    Well balanced diet     Social Determinants of Health     Financial Resource Strain: Low Risk  (7/18/2023)    Overall Financial Resource Strain (CARDIA)     Difficulty of Paying Living Expenses: Not hard at all   Food Insecurity: No Food Insecurity (12/8/2023)    Hunger Vital Sign     Worried About Running Out of Food in the Last Year: Never true     Ran Out of Food in the Last Year: Never true   Transportation Needs: No Transportation Needs (12/8/2023)    PRAPARE - Transportation     Lack of Transportation (Medical): No     Lack of Transportation (Non-Medical): No   Physical Activity: Not on file   Stress: Not on file   Social Connections: Not on file   Intimate Partner Violence: Not on file   Housing Stability: Low Risk  (12/8/2023)    Housing Stability Vital Sign     Unable to Pay for Housing in  the Last Year: No     Number of Places Lived in the Last Year: 1     Unstable Housing in the Last Year: No       Current Outpatient Medications   Medication Sig Dispense Refill    acetaminophen (TYLENOL) 325 mg tablet Take 2 tablets (650 mg total) by mouth every 4 (four) hours as needed for mild pain  0    amiodarone 200 mg tablet TAKE 1/2 TABLET (100 MG TOTAL) BY MOUTH DAILY WITH BREAKFAST 15 tablet 8    atorvastatin (LIPITOR) 20 mg tablet TAKE 1 TABLET BY MOUTH EVERY DAY WITH DINNER 90 tablet 4    bumetanide (BUMEX) 2 mg tablet TAKE 1 TABLET BY MOUTH EVERY DAY 30 tablet 8    metoprolol succinate (TOPROL-XL) 25 mg 24 hr tablet TAKE 1 TABLET BY MOUTH EVERY DAY 30 tablet 8    nitroglycerin (NITROSTAT) 0.4 mg SL tablet Place 1 tablet (0.4 mg total) under the tongue every 5 (five) minutes as needed for chest pain 25 tablet 0    prasugrel (EFFIENT) tablet 1 tablet daily 90 tablet 3    rivaroxaban (Xarelto) 15 mg tablet Take 1 tablet (15 mg total) by mouth daily with breakfast 90 tablet 3    sertraline (ZOLOFT) 25 mg tablet TAKE 1 TABLET BY MOUTH EVERY DAY 90 tablet 1    carbamide peroxide (DEBROX) 6.5 % otic solution Administer 5 drops to the right ear 2 (two) times a day for 4 days 15 mL 0    levETIRAcetam (Keppra) 500 mg tablet Take 1 tablet (500 mg total) by mouth every 12 (twelve) hours Do not start before December 31, 2023. (Patient not taking: Reported on 1/2/2024) 60 tablet 0    multivitamin (THERAGRAN) TABS Take 1 tablet by mouth daily. (Patient not taking: Reported on 1/2/2024)      polyethylene glycol (MIRALAX) 17 g packet Take 17 g by mouth daily as needed (Constipation) for up to 5 days 85 g 0    senna (SENOKOT) 8.6 mg Take 2 tablets (17.2 mg total) by mouth daily for 7 days Do not start before November 14, 2023. 14 tablet 0     No current facility-administered medications for this visit.       Review of Systems     As noted in HPI    Objective      /80   Pulse 79   Temp (!) 97.2 °F (36.2 °C) (Skin)   " Ht 4' 11\" (1.499 m)   Wt 60.3 kg (133 lb)   SpO2 93%   BMI 26.86 kg/m²     Physical Exam  Vitals reviewed.   Constitutional:       General: She is not in acute distress.     Appearance: Normal appearance.   HENT:      Head: Normocephalic and atraumatic.      Right Ear: External ear normal.      Left Ear: External ear normal.      Nose: Nose normal.      Mouth/Throat:      Mouth: Mucous membranes are moist.      Pharynx: Oropharynx is clear.   Eyes:      Extraocular Movements: Extraocular movements intact.      Conjunctiva/sclera: Conjunctivae normal.      Pupils: Pupils are equal, round, and reactive to light.   Cardiovascular:      Rate and Rhythm: Normal rate and regular rhythm.      Pulses: Normal pulses.      Heart sounds: Normal heart sounds.   Pulmonary:      Effort: Pulmonary effort is normal.      Breath sounds: Normal breath sounds.   Abdominal:      General: Abdomen is flat. Bowel sounds are normal.      Palpations: Abdomen is soft.      Tenderness: There is no abdominal tenderness.   Musculoskeletal:      Cervical back: Neck supple.      Right lower leg: Right lower leg edema: no pitting edema.      Left lower leg: Left lower leg edema: no pitting edema.   Lymphadenopathy:      Cervical: No cervical adenopathy.   Skin:     General: Skin is warm and dry.   Neurological:      Mental Status: She is alert. Mental status is at baseline.   Psychiatric:         Mood and Affect: Mood normal.         Behavior: Behavior normal.         Thought Content: Thought content normal.             Some portions of this record may have been generated with voice recognition software. There may be translation, syntax, or grammatical errors. Occasional wrong word or \"sound-a-like\" substitutions may have occurred due to the inherent limitations of the voice recognition software. Read the chart carefully and recognize, using context, where substations may have occurred. If you have any questions, please contact the dictating " provider for clarification or correction, as needed.

## 2024-03-22 ENCOUNTER — OFFICE VISIT (OUTPATIENT)
Dept: VASCULAR SURGERY | Facility: CLINIC | Age: 84
End: 2024-03-22

## 2024-03-22 VITALS
WEIGHT: 133 LBS | HEART RATE: 80 BPM | BODY MASS INDEX: 26.81 KG/M2 | DIASTOLIC BLOOD PRESSURE: 64 MMHG | SYSTOLIC BLOOD PRESSURE: 142 MMHG | HEIGHT: 59 IN

## 2024-03-22 DIAGNOSIS — E78.2 MIXED HYPERLIPIDEMIA: ICD-10-CM

## 2024-03-22 DIAGNOSIS — I65.23 CAROTID STENOSIS, ASYMPTOMATIC, BILATERAL: Primary | ICD-10-CM

## 2024-03-22 DIAGNOSIS — I10 ESSENTIAL HYPERTENSION: Chronic | ICD-10-CM

## 2024-03-22 DIAGNOSIS — I73.9 PAD (PERIPHERAL ARTERY DISEASE) (HCC): ICD-10-CM

## 2024-03-22 NOTE — PROGRESS NOTES
Assessment/Plan:    Carotid stenosis, asymptomatic, bilateral  She is denying any TIA/CVA symptoms (amaurosis fugax, slurred speech, facial droop, dysphagia, unilateral weakness, headache).  Patient verbalizes his frustration regarding having lost her 's license to several years ago due to possible seizure activity.     Imaging:  Carotid duplex 2/26/2024:  <50% stenosis right ICA.  Severe right subclavian artery stenosis.  50-69% left ICA stenosis.     Plan:  -We discussed the pathophysiology of carotid artery stenosis.  As well as contributing lifestyle factors.  -We discussed the results of carotid duplex.  No significant change from previous ultrasound.  -Continue medical optimization with atorvastatin and prasugrel.  -Will repeat carotid duplex in 6 months.  -Instructed patient to report to the ED for any TIA/CVA symptoms.  -Follow up in the office in 6 months      PAD (peripheral artery disease) (HCC)  Patient reports that she is active and independent with ADLs.  She does not walk as much as she used to however denies any lower extremity claudication, rest pain, or tissue loss.  Patient did have a wound to her right lower extremity that required skin grafting.  She reports that this wound has completely healed at this time.    Imaging:  LEAD 6/21/2023:  RIGHT LOWER LIMB:  Occlusion at the origin of the SFA with reconstitution of the popliteal artery.  Known occlusion of PTA. The anterior tibial artery is  patent.  Ankle/Brachial index: 0.58/90/85  .  LEFT LOWER LIMB:  Diffuse disease of the femoral popliteal segment with a short segment occlusion of the mid to distal SFA vs high grade stenosis.  Known occlusion of PTA. The anterior tibial artery is patent.  Ankle/Brachial index: 0.56/64/71    Plan:  -We discussed the pathophysiology of peripheral arterial disease as well as contributing lifestyle factors.  -We discussed the results of LEAD at length.  No significant change from her previous studies and  great toe pressures remain above healing threshold.  -Continue medical optimization with atorvastatin and prasugrel  -Will repeat LEAD in 6 months.  -Instructed patient to notify office of any worsening claudication, rest pain, or tissue loss.  -Instructed patient to report to the ED for any symptoms of acute limb ischemia (color/temperature change, motor/sensory loss, tissue loss).  -Follow up in the office in 6 months after repeat LEAD      Essential hypertension  Blood pressure mildly elevated in the office today, 142/64.  Patient and  do report that they just returned from [employer].    -Continue medical management per PCP.  -Low-salt diet    Hyperlipidemia  Continue atorvastatin.       Diagnoses and all orders for this visit:    Carotid stenosis, asymptomatic, bilateral  -     VAS carotid complete study; Future    PAD (peripheral artery disease) ([facility])  -     VAS ARTERIAL DUPLEX- LOWER LIMB BILATERAL; Future    Essential hypertension    Mixed hyperlipidemia          Subjective:      Patient ID: Verito Fallon is a 83 y.o. female.    Patient is an 83-year-old female, non-smoker, with PMH HTN, HLD, CHF, atrial fibrillation (on Xarelto), pulmonary hypertension, sick sinus syndrome s/p pacemaker placement, CAD s/p CABG, CKD 3, seizure disorder, osteoporosis, renal artery stenosis, and asymptomatic bilateral carotid artery stenosis.  Patient is presenting to the vascular surgery office to review results of LEAD completed 6/21/2023 and carotid duplex completed 2/26/2024.  Patient is accompanied by her .    Patient is currently denying any concerning symptoms.  She is denying any TIA/CVA symptoms (amaurosis fugax, slurred speech, facial droop, dysphagia, unilateral weakness, headache).  Patient verbalizes his frustration regarding having lost her 's license to several years ago due to possible seizure activity.  She denies any recurrent seizure activity since her hospitalization in 2021.  Patient  "reports that she is active and independent with ADLs.  She does not walk as much as she used to however denies any lower extremity claudication, rest pain, or tissue loss.  Patient did have a wound to her right lower extremity that required skin grafting.  She reports that this wound has completely healed at this time.        The following portions of the patient's history were reviewed and updated as appropriate: allergies, current medications, past family history, past medical history, past social history, past surgical history, and problem list.    Review of Systems   Constitutional: Negative.  Negative for activity change.   HENT: Negative.     Eyes: Negative.    Respiratory: Negative.  Negative for shortness of breath.    Cardiovascular: Negative.  Negative for chest pain.   Gastrointestinal: Negative.    Endocrine: Negative.    Genitourinary: Negative.    Musculoskeletal: Negative.    Skin: Negative.  Negative for color change, pallor and wound.   Allergic/Immunologic: Negative.    Neurological: Negative.  Negative for facial asymmetry, speech difficulty, weakness and numbness.   Hematological: Negative.    Psychiatric/Behavioral: Negative.           Objective:      /64 (BP Location: Left arm, Patient Position: Sitting, Cuff Size: Standard)   Pulse 80   Ht 4' 11\" (1.499 m)   Wt 60.3 kg (133 lb)   BMI 26.86 kg/m²          Physical Exam  Constitutional:       General: She is not in acute distress.     Appearance: Normal appearance.   HENT:      Head: Normocephalic and atraumatic.   Neck:      Vascular: No carotid bruit.   Cardiovascular:      Rate and Rhythm: Normal rate and regular rhythm.      Pulses:           Radial pulses are 1+ on the right side and 2+ on the left side.        Posterior tibial pulses are 2+ on the right side and 2+ on the left side.      Heart sounds: Normal heart sounds. No murmur heard.  Pulmonary:      Effort: Pulmonary effort is normal. No respiratory distress.      Breath " "sounds: Normal breath sounds.   Musculoskeletal:         General: No swelling or tenderness.      Cervical back: Normal range of motion and neck supple. No tenderness.      Right lower leg: No edema.      Left lower leg: No edema.   Skin:     General: Skin is warm and dry.      Capillary Refill: Capillary refill takes less than 2 seconds.      Findings: No lesion, rash or wound.   Neurological:      General: No focal deficit present.      Mental Status: She is alert and oriented to person, place, and time.      Cranial Nerves: No cranial nerve deficit, dysarthria or facial asymmetry.      Sensory: Sensation is intact.      Motor: Motor function is intact. No weakness.   Psychiatric:         Mood and Affect: Mood normal.         Behavior: Behavior normal.     I have reviewed and made appropriate changes to the review of systems input by the medical assistant.    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)    A-fib (HCC)    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HCC)    PAD (peripheral artery disease) ([facility])    Abnormal liver enzymes    Edema of left upper extremity    Urinary retention    Seizure-like activity ([facility])    Hyperlipidemia    Toxic metabolic encephalopathy    Depression    Current use of long term anticoagulation    Long term current use of amiodarone    Renal artery stenosis (HCC)    Pulmonary hypertension (HCC)    Sick sinus syndrome (HCC)    Hx of CABG    CAD (coronary artery disease)    Memory loss    Cognitive impairment    Atherosclerosis with claudication of extremity (HCC)    Carotid stenosis, asymptomatic, bilateral    Acute (reversible) ischemia of small intestine, extent unspecified (HCC)    Cervical disc disorder with radiculopathy of " mid-cervical region    Cervical spondylosis    Fall    Wound of right leg    Pruritic rash    Multiple contusions    Closed fracture of fifth metacarpal bone of right hand    Closed nondisplaced fracture of fifth metacarpal bone of right hand, unspecified portion of metacarpal, initial encounter    Leg hematoma, right, subsequent encounter    History of seizure    At risk for constipation    At risk for delirium    Advance care planning    Hyperkalemia    Hypokalemia    Stage 3 chronic kidney disease, unspecified whether stage 3a or 3b CKD (HCC)    Osteoporosis    Presence of permanent cardiac pacemaker       Past Surgical History:   Procedure Laterality Date    APPENDECTOMY  1947    CARDIAC PACEMAKER PLACEMENT  01/2021    CARDIAC SURGERY      CABG    CATARACT EXTRACTION Bilateral     COLONOSCOPY  2012    Fiberoptic    COLONOSCOPY      Resolved: 2006 - 2012 5 year f/u    CORONARY ANGIOPLASTY WITH STENT PLACEMENT      CORONARY ARTERY BYPASS GRAFT      Resolved: 2012    ESOPHAGOGASTRODUODENOSCOPY  2012    Diagnostic    HEMORROIDECTOMY      KNEE SURGERY      LITHOTRIPSY      Renal    MALIGNANT SKIN LESION EXCISION      Face; Resolved: 2004    NE ESOPHAGOGASTRODUODENOSCOPY TRANSORAL DIAGNOSTIC N/A 4/13/2016    Procedure: EGD AND COLONOSCOPY;  Surgeon: Evelin Bone MD;  Location: [facility];  Service: Gastroenterology    RENAL ARTERY STENT      SKIN LESION EXCISION      Scalp    SOFT TISSUE TUMOR RESECTION      Shoulder; Resolved: 1995    SPLIT THICKNESS SKIN GRAFT Right 12/14/2023    Procedure: SKIN GRAFT SPLIT THICKNESS (ST[facility])  EXTREMITY; VAC application;  Surgeon: Ap Brito DO;  Location: [facility];  Service: General    THROMBOLYSIS      Postoperative Thrombolysis PTCA    TONSILLECTOMY      Resolved: 1944    WOUND DEBRIDEMENT Right 12/8/2023    Procedure: DEBRIDEMENT LOWER EXTREMITY (WASH OUT); POSSIBLE VAC APPLICATION;  Surgeon: Abhijeet Davies MD;  Location: [facility];  Service: General       Family  History   Problem Relation Age of Onset    Heart disease Mother     Hypertension Mother     Osteoporosis Mother     Heart disease Father     Hypertension Father     No Known Problems Sister     Heart disease Brother     Diabetes Brother     No Known Problems Daughter     No Known Problems Son     No Known Problems Maternal Grandmother     No Known Problems Maternal Grandfather     No Known Problems Paternal Grandmother     No Known Problems Paternal Grandfather     Ulcerative colitis Family     Colon polyps Family     Breast cancer Neg Hx     Colon cancer Neg Hx     Ovarian cancer Neg Hx        Social History     Socioeconomic History    Marital status: /Civil Union     Spouse name: Not on file    Number of children: Not on file    Years of education: Not on file    Highest education level: Not on file   Occupational History    Occupation: retired   Tobacco Use    Smoking status: Never    Smokeless tobacco: Never   Vaping Use    Vaping status: Never Used   Substance and Sexual Activity    Alcohol use: Yes     Alcohol/week: 1.0 standard drink of alcohol     Types: 1 Glasses of wine per week     Comment: occasionally, but no alcohol intake recently    Drug use: Never    Sexual activity: Not Currently   Other Topics Concern    Not on file   Social History Narrative    Activities: golf    Caffeine use    Consumes healthy diet    Daily caffeinated coffee consumption: 1 cup per day    Drinks caffeinated tea: 1 cup per day    Well balanced diet     Social Determinants of Health     Financial Resource Strain: Low Risk  (7/18/2023)    Overall Financial Resource Strain (CARDIA)     Difficulty of Paying Living Expenses: Not hard at all   Food Insecurity: No Food Insecurity (12/8/2023)    Hunger Vital Sign     Worried About Running Out of Food in the Last Year: Never true     Ran Out of Food in the Last Year: Never true   Transportation Needs: No Transportation Needs (12/8/2023)    PRAPARE - Transportation     Lack of  Transportation (Medical): No     Lack of Transportation (Non-Medical): No   Physical Activity: Not on file   Stress: Not on file   Social Connections: Not on file   Intimate Partner Violence: Not on file   Housing Stability: Low Risk  (12/8/2023)    Housing Stability Vital Sign     Unable to Pay for Housing in the Last Year: No     Number of Places Lived in the Last Year: 1     Unstable Housing in the Last Year: No       Allergies   Allergen Reactions    Sulfa Antibiotics Anaphylaxis    Formaldehyde     Codeine Palpitations         Current Outpatient Medications:     acetaminophen (TYLENOL) 325 mg tablet, Take 2 tablets (650 mg total) by mouth every 4 (four) hours as needed for mild pain, Disp: , Rfl: 0    amiodarone 200 mg tablet, TAKE 1/2 TABLET (100 MG TOTAL) BY MOUTH DAILY WITH BREAKFAST, Disp: 15 tablet, Rfl: 8    atorvastatin (LIPITOR) 20 mg tablet, TAKE 1 TABLET BY MOUTH EVERY DAY WITH DINNER, Disp: 90 tablet, Rfl: 4    bumetanide (BUMEX) 2 mg tablet, TAKE 1 TABLET BY MOUTH EVERY DAY, Disp: 30 tablet, Rfl: 8    carbamide peroxide (DEBROX) 6.5 % otic solution, Administer 5 drops to the right ear 2 (two) times a day for 4 days, Disp: 15 mL, Rfl: 0    metoprolol succinate (TOPROL-XL) 25 mg 24 hr tablet, TAKE 1 TABLET BY MOUTH EVERY DAY, Disp: 30 tablet, Rfl: 8    nitroglycerin (NITROSTAT) 0.4 mg SL tablet, Place 1 tablet (0.4 mg total) under the tongue every 5 (five) minutes as needed for chest pain, Disp: 25 tablet, Rfl: 0    polyethylene glycol (MIRALAX) 17 g packet, Take 17 g by mouth daily as needed (Constipation) for up to 5 days, Disp: 85 g, Rfl: 0    prasugrel (EFFIENT) tablet, 1 tablet daily, Disp: 90 tablet, Rfl: 3    rivaroxaban (Xarelto) 15 mg tablet, Take 1 tablet (15 mg total) by mouth daily with breakfast, Disp: 90 tablet, Rfl: 3    senna (SENOKOT) 8.6 mg, Take 2 tablets (17.2 mg total) by mouth daily for 7 days Do not start before November 14, 2023., Disp: 14 tablet, Rfl: 0    sertraline (ZOLOFT)  25 mg tablet, TAKE 1 TABLET BY MOUTH EVERY DAY, Disp: 90 tablet, Rfl: 1    levETIRAcetam (Keppra) 500 mg tablet, Take 1 tablet (500 mg total) by mouth every 12 (twelve) hours Do not start before December 31, 2023. (Patient not taking: Reported on 3/22/2024), Disp: 60 tablet, Rfl: 0    multivitamin (THERAGRAN) TABS, Take 1 tablet by mouth daily. (Patient not taking: Reported on 1/2/2024), Disp: , Rfl:     I have spent a total time of 30 minutes on 03/22/24 in caring for this patient including Diagnostic results, Prognosis, Risks and benefits of tx options, Instructions for management, Patient and family education, Importance of tx compliance, Risk factor reductions, Impressions, Counseling / Coordination of care, Documenting in the medical record, Reviewing / ordering tests, medicine, procedures  , and Obtaining or reviewing history  .

## 2024-03-22 NOTE — ASSESSMENT & PLAN NOTE
Patient reports that she is active and independent with ADLs.  She does not walk as much as she used to however denies any lower extremity claudication, rest pain, or tissue loss.  Patient did have a wound to her right lower extremity that required skin grafting.  She reports that this wound has completely healed at this time.    Imaging:  LEAD 6/21/2023:  RIGHT LOWER LIMB:  Occlusion at the origin of the SFA with reconstitution of the popliteal artery.  Known occlusion of PTA. The anterior tibial artery is  patent.  Ankle/Brachial index: 0.58/90/85  .  LEFT LOWER LIMB:  Diffuse disease of the femoral popliteal segment with a short segment occlusion of the mid to distal SFA vs high grade stenosis.  Known occlusion of PTA. The anterior tibial artery is patent.  Ankle/Brachial index: 0.56/64/71    Plan:  -We discussed the pathophysiology of peripheral arterial disease as well as contributing lifestyle factors.  -We discussed the results of LEAD at length.  No significant change from her previous studies and great toe pressures remain above healing threshold.  -Continue medical optimization with atorvastatin and prasugrel  -Will repeat LEAD in 6 months.  -Instructed patient to notify office of any worsening claudication, rest pain, or tissue loss.  -Instructed patient to report to the ED for any symptoms of acute limb ischemia (color/temperature change, motor/sensory loss, tissue loss).  -Follow up in the office in 6 months after repeat LEAD

## 2024-03-22 NOTE — ASSESSMENT & PLAN NOTE
She is denying any TIA/CVA symptoms (amaurosis fugax, slurred speech, facial droop, dysphagia, unilateral weakness, headache).  Patient verbalizes his frustration regarding having lost her 's license to several years ago due to possible seizure activity.     Imaging:  Carotid duplex 2/26/2024:  <50% stenosis right ICA.  Severe right subclavian artery stenosis.  50-69% left ICA stenosis.     Plan:  -We discussed the pathophysiology of carotid artery stenosis.  As well as contributing lifestyle factors.  -We discussed the results of carotid duplex.  No significant change from previous ultrasound.  -Continue medical optimization with atorvastatin and prasugrel.  -Will repeat carotid duplex in 6 months.  -Instructed patient to report to the ED for any TIA/CVA symptoms.  -Follow up in the office in 6 months

## 2024-03-22 NOTE — ASSESSMENT & PLAN NOTE
Blood pressure mildly elevated in the office today, 142/64.  Patient and  do report that they just returned from [employer].    -Continue medical management per PCP.  -Low-salt diet

## 2024-03-27 DIAGNOSIS — I48.91 A-FIB (HCC): ICD-10-CM

## 2024-03-27 RX ORDER — AMIODARONE HYDROCHLORIDE 200 MG/1
TABLET ORAL
Qty: 45 TABLET | Refills: 3 | Status: SHIPPED | OUTPATIENT
Start: 2024-03-27

## 2024-04-18 DIAGNOSIS — I25.10 CORONARY ARTERY DISEASE INVOLVING NATIVE HEART WITHOUT ANGINA PECTORIS, UNSPECIFIED VESSEL OR LESION TYPE: Chronic | ICD-10-CM

## 2024-04-18 DIAGNOSIS — H61.21 IMPACTED CERUMEN, RIGHT EAR: ICD-10-CM

## 2024-04-19 RX ORDER — ATORVASTATIN CALCIUM 20 MG/1
TABLET, FILM COATED ORAL
Qty: 30 TABLET | Refills: 0 | Status: SHIPPED | OUTPATIENT
Start: 2024-04-19

## 2024-04-19 RX ORDER — ASPIRIN 81 MG
TABLET, DELAYED RELEASE (ENTERIC COATED) ORAL
Qty: 15 ML | Refills: 0 | Status: SHIPPED | OUTPATIENT
Start: 2024-04-19

## 2024-05-18 DIAGNOSIS — I25.10 CORONARY ARTERY DISEASE INVOLVING NATIVE HEART WITHOUT ANGINA PECTORIS, UNSPECIFIED VESSEL OR LESION TYPE: Chronic | ICD-10-CM

## 2024-05-23 RX ORDER — ATORVASTATIN CALCIUM 20 MG/1
TABLET, FILM COATED ORAL
Qty: 90 TABLET | Refills: 4 | Status: SHIPPED | OUTPATIENT
Start: 2024-05-23

## 2024-07-05 ENCOUNTER — HOSPITAL ENCOUNTER (EMERGENCY)
Facility: HOSPITAL | Age: 84
Discharge: HOME | End: 2024-07-05
Attending: EMERGENCY MEDICINE
Payer: MEDICARE

## 2024-07-05 ENCOUNTER — APPOINTMENT (EMERGENCY)
Dept: RADIOLOGY | Facility: HOSPITAL | Age: 84
End: 2024-07-05
Payer: MEDICARE

## 2024-07-05 VITALS
BODY MASS INDEX: 26.26 KG/M2 | SYSTOLIC BLOOD PRESSURE: 161 MMHG | HEART RATE: 72 BPM | RESPIRATION RATE: 14 BRPM | WEIGHT: 130 LBS | OXYGEN SATURATION: 92 % | DIASTOLIC BLOOD PRESSURE: 69 MMHG | TEMPERATURE: 97.5 F

## 2024-07-05 DIAGNOSIS — E87.6 HYPOKALEMIA: ICD-10-CM

## 2024-07-05 DIAGNOSIS — K59.09 OTHER CONSTIPATION: Primary | ICD-10-CM

## 2024-07-05 LAB
2HR DELTA HS TROPONIN: -1 NG/L
ALBUMIN SERPL BCG-MCNC: 3.8 G/DL (ref 3.5–5)
ALP SERPL-CCNC: 71 U/L (ref 34–104)
ALT SERPL W P-5'-P-CCNC: 13 U/L (ref 7–52)
ANION GAP SERPL CALCULATED.3IONS-SCNC: 9 MMOL/L (ref 4–13)
AST SERPL W P-5'-P-CCNC: 20 U/L (ref 13–39)
ATRIAL RATE: 71 BPM
BACTERIA UR QL AUTO: ABNORMAL /HPF
BASOPHILS # BLD AUTO: 0.04 THOUSANDS/ÂΜL (ref 0–0.1)
BASOPHILS NFR BLD AUTO: 1 % (ref 0–1)
BILIRUB SERPL-MCNC: 1.29 MG/DL (ref 0.2–1)
BILIRUB UR QL STRIP: NEGATIVE
BUN SERPL-MCNC: 16 MG/DL (ref 5–25)
CALCIUM SERPL-MCNC: 8.8 MG/DL (ref 8.4–10.2)
CARDIAC TROPONIN I PNL SERPL HS: 27 NG/L
CARDIAC TROPONIN I PNL SERPL HS: 28 NG/L
CHLORIDE SERPL-SCNC: 100 MMOL/L (ref 96–108)
CLARITY UR: CLEAR
CO2 SERPL-SCNC: 33 MMOL/L (ref 21–32)
COLOR UR: ABNORMAL
CREAT SERPL-MCNC: 1.13 MG/DL (ref 0.6–1.3)
EOSINOPHIL # BLD AUTO: 0.2 THOUSAND/ÂΜL (ref 0–0.61)
EOSINOPHIL NFR BLD AUTO: 3 % (ref 0–6)
ERYTHROCYTE [DISTWIDTH] IN BLOOD BY AUTOMATED COUNT: 15.7 % (ref 11.6–15.1)
GFR SERPL CREATININE-BSD FRML MDRD: 44 ML/MIN/1.73SQ M
GLUCOSE SERPL-MCNC: 108 MG/DL (ref 65–140)
GLUCOSE UR STRIP-MCNC: NEGATIVE MG/DL
HCT VFR BLD AUTO: 39.9 % (ref 34.8–46.1)
HGB BLD-MCNC: 12.9 G/DL (ref 11.5–15.4)
HGB UR QL STRIP.AUTO: NEGATIVE
HYALINE CASTS #/AREA URNS LPF: ABNORMAL /LPF
IMM GRANULOCYTES # BLD AUTO: 0.05 THOUSAND/UL (ref 0–0.2)
IMM GRANULOCYTES NFR BLD AUTO: 1 % (ref 0–2)
KETONES UR STRIP-MCNC: NEGATIVE MG/DL
LACTATE SERPL-SCNC: 1.9 MMOL/L (ref 0.5–2)
LEUKOCYTE ESTERASE UR QL STRIP: ABNORMAL
LIPASE SERPL-CCNC: 38 U/L (ref 11–82)
LYMPHOCYTES # BLD AUTO: 0.68 THOUSANDS/ÂΜL (ref 0.6–4.47)
LYMPHOCYTES NFR BLD AUTO: 9 % (ref 14–44)
MAGNESIUM SERPL-MCNC: 1.9 MG/DL (ref 1.9–2.7)
MCH RBC QN AUTO: 28.4 PG (ref 26.8–34.3)
MCHC RBC AUTO-ENTMCNC: 32.3 G/DL (ref 31.4–37.4)
MCV RBC AUTO: 88 FL (ref 82–98)
MONOCYTES # BLD AUTO: 0.65 THOUSAND/ÂΜL (ref 0.17–1.22)
MONOCYTES NFR BLD AUTO: 9 % (ref 4–12)
NEUTROPHILS # BLD AUTO: 6.01 THOUSANDS/ÂΜL (ref 1.85–7.62)
NEUTS SEG NFR BLD AUTO: 77 % (ref 43–75)
NITRITE UR QL STRIP: NEGATIVE
NON-SQ EPI CELLS URNS QL MICRO: ABNORMAL /HPF
NRBC BLD AUTO-RTO: 0 /100 WBCS
P AXIS: 245 DEGREES
PH UR STRIP.AUTO: 6.5 [PH]
PLATELET # BLD AUTO: 186 THOUSANDS/UL (ref 149–390)
PMV BLD AUTO: 11.1 FL (ref 8.9–12.7)
POTASSIUM SERPL-SCNC: 2.6 MMOL/L (ref 3.5–5.3)
PR INTERVAL: 104 MS
PROT SERPL-MCNC: 6.8 G/DL (ref 6.4–8.4)
PROT UR STRIP-MCNC: NEGATIVE MG/DL
QRS AXIS: 127 DEGREES
QRSD INTERVAL: 152 MS
QT INTERVAL: 406 MS
QTC INTERVAL: 441 MS
RBC # BLD AUTO: 4.54 MILLION/UL (ref 3.81–5.12)
RBC #/AREA URNS AUTO: ABNORMAL /HPF
SODIUM SERPL-SCNC: 142 MMOL/L (ref 135–147)
SP GR UR STRIP.AUTO: 1.01 (ref 1–1.03)
T WAVE AXIS: 153 DEGREES
UROBILINOGEN UR STRIP-ACNC: <2 MG/DL
VENTRICULAR RATE: 71 BPM
WBC # BLD AUTO: 7.63 THOUSAND/UL (ref 4.31–10.16)
WBC #/AREA URNS AUTO: ABNORMAL /HPF

## 2024-07-05 PROCEDURE — 83735 ASSAY OF MAGNESIUM: CPT

## 2024-07-05 PROCEDURE — 81001 URINALYSIS AUTO W/SCOPE: CPT

## 2024-07-05 PROCEDURE — 80053 COMPREHEN METABOLIC PANEL: CPT

## 2024-07-05 PROCEDURE — 83690 ASSAY OF LIPASE: CPT

## 2024-07-05 PROCEDURE — 96361 HYDRATE IV INFUSION ADD-ON: CPT

## 2024-07-05 PROCEDURE — 99285 EMERGENCY DEPT VISIT HI MDM: CPT | Performed by: EMERGENCY MEDICINE

## 2024-07-05 PROCEDURE — 36415 COLL VENOUS BLD VENIPUNCTURE: CPT

## 2024-07-05 PROCEDURE — 96365 THER/PROPH/DIAG IV INF INIT: CPT

## 2024-07-05 PROCEDURE — 93010 ELECTROCARDIOGRAM REPORT: CPT | Performed by: INTERNAL MEDICINE

## 2024-07-05 PROCEDURE — 74177 CT ABD & PELVIS W/CONTRAST: CPT

## 2024-07-05 PROCEDURE — 85025 COMPLETE CBC W/AUTO DIFF WBC: CPT

## 2024-07-05 PROCEDURE — 83605 ASSAY OF LACTIC ACID: CPT

## 2024-07-05 PROCEDURE — 93005 ELECTROCARDIOGRAM TRACING: CPT

## 2024-07-05 PROCEDURE — 84484 ASSAY OF TROPONIN QUANT: CPT

## 2024-07-05 PROCEDURE — 96366 THER/PROPH/DIAG IV INF ADDON: CPT

## 2024-07-05 PROCEDURE — 99284 EMERGENCY DEPT VISIT MOD MDM: CPT

## 2024-07-05 PROCEDURE — 96375 TX/PRO/DX INJ NEW DRUG ADDON: CPT

## 2024-07-05 RX ORDER — HYDROMORPHONE HCL/PF 1 MG/ML
0.5 SYRINGE (ML) INJECTION ONCE
Status: COMPLETED | OUTPATIENT
Start: 2024-07-05 | End: 2024-07-05

## 2024-07-05 RX ORDER — POLYETHYLENE GLYCOL 3350 17 G/17G
17 POWDER, FOR SOLUTION ORAL DAILY
Qty: 119 G | Refills: 0 | Status: SHIPPED | OUTPATIENT
Start: 2024-07-05 | End: 2024-07-12

## 2024-07-05 RX ORDER — POTASSIUM CHLORIDE 14.9 MG/ML
20 INJECTION INTRAVENOUS ONCE
Status: DISCONTINUED | OUTPATIENT
Start: 2024-07-05 | End: 2024-07-05

## 2024-07-05 RX ORDER — ONDANSETRON 2 MG/ML
4 INJECTION INTRAMUSCULAR; INTRAVENOUS ONCE
Status: COMPLETED | OUTPATIENT
Start: 2024-07-05 | End: 2024-07-05

## 2024-07-05 RX ORDER — POTASSIUM CHLORIDE 14.9 MG/ML
20 INJECTION INTRAVENOUS ONCE
Status: COMPLETED | OUTPATIENT
Start: 2024-07-05 | End: 2024-07-05

## 2024-07-05 RX ORDER — POTASSIUM CHLORIDE 20MEQ/15ML
40 LIQUID (ML) ORAL ONCE
Status: COMPLETED | OUTPATIENT
Start: 2024-07-05 | End: 2024-07-05

## 2024-07-05 RX ADMIN — ONDANSETRON 4 MG: 2 INJECTION INTRAMUSCULAR; INTRAVENOUS at 18:00

## 2024-07-05 RX ADMIN — POTASSIUM CHLORIDE 40 MEQ: 1.5 SOLUTION ORAL at 23:03

## 2024-07-05 RX ADMIN — HYDROMORPHONE HYDROCHLORIDE 0.5 MG: 1 INJECTION, SOLUTION INTRAMUSCULAR; INTRAVENOUS; SUBCUTANEOUS at 18:00

## 2024-07-05 RX ADMIN — IOHEXOL 85 ML: 350 INJECTION, SOLUTION INTRAVENOUS at 20:19

## 2024-07-05 RX ADMIN — SODIUM CHLORIDE 1000 ML: 0.9 INJECTION, SOLUTION INTRAVENOUS at 21:13

## 2024-07-05 RX ADMIN — SODIUM CHLORIDE 1000 ML: 0.9 INJECTION, SOLUTION INTRAVENOUS at 18:00

## 2024-07-05 RX ADMIN — POTASSIUM CHLORIDE 20 MEQ: 14.9 INJECTION, SOLUTION INTRAVENOUS at 19:40

## 2024-07-05 NOTE — ED ATTENDING ATTESTATION
7/5/2024  I, James Kwok MD, saw and evaluated the patient. I have discussed the patient with the resident/non-physician practitioner and agree with the resident's/non-physician practitioner's findings, Plan of Care, and MDM as documented in the resident's/non-physician practitioner's note, except where noted. All available labs and Radiology studies were reviewed.  I was present for key portions of any procedure(s) performed by the resident/non-physician practitioner and I was immediately available to provide assistance.       At this point I agree with the current assessment done in the Emergency Department.  I have conducted an independent evaluation of this patient a history and physical is as follows:    ED Course         Critical Care Time  Procedures    84 yo female with lower abdominal pain started this morning, intermittent but worsens at times.  Pt feels constipated, no urinary complaints, no vomiting, no fever.  Pmh appendectomy, cad, htn.  Vss, afebrile, lungs cta, rrr, abdomen soft distended mild tenderness llq. Labs, lactate, mg, phos, urine, ct a/p. Zofran, ivf.

## 2024-07-05 NOTE — ED PROVIDER NOTES
"History  Chief Complaint  Patient presents with  •  Abdominal Pain  Pt comes today from [facility],  is currently on hospice and today was vomiting and brought down to ED for eval. Pt currently reports RLQ pain and reports unable to remember when last BM was.    Patient is an 82 yo female with past medical history of CAD w/ CABG and cardiac stent, Afib on Xralto with implanted pacemaker, hypertension, and ischemic colitis, past surgical history of appendectomy and hemmorroidectomy, who presents with a 1 day history of sudden onset lower abdominal pain accompanied with nausea and vomiting. Patient states that she has felt \"constipated\" over the past few days and cannot remember when her last BM was, but that the pain and nausea began this morning. She endorses that the pain is constant but that it will \"spike\" to a 10/10 pain, and localizes it to her LLQ which radiates to her RLQ. She thinks she may have had a diverticulosis at some point in the past but is unsure. She denies any urinary retention or burning, CP, SOB, blood in stool or urine, fever, or diarrhea.      Abdominal Pain  Associated symptoms: constipation, fatigue, nausea and vomiting    Associated symptoms: no chest pain, no chills, no cough, no diarrhea, no dysuria, no fever, no hematuria, no shortness of breath and no sore throat        Prior to Admission Medications  Prescriptions  Last Dose  Informant  Patient Reported?  Taking?  Debrox 6.5 % otic solution  No  No  Sig: ADMINISTER 5 DROPS TO THE RIGHT EAR 2 (TWO) TIMES A DAY FOR 4 DAYS  acetaminophen (TYLENOL) 325 mg tablet  Spouse/Significant Other  No  No  Sig: Take 2 tablets (650 mg total) by mouth every 4 (four) hours as needed for mild pain  amiodarone 200 mg tablet  No  No  Sig: TAKE 1/2 TABLET (100 MG TOTAL) BY MOUTH DAILY WITH BREAKFAST  atorvastatin (LIPITOR) 20 mg tablet  No  No  Sig: TAKE 1 TABLET BY MOUTH EVERY DAY WITH DINNER  bumetanide (BUMEX) 2 mg tablet  Spouse/Significant " Other  No  No  Sig: TAKE 1 TABLET BY MOUTH EVERY DAY  levETIRAcetam (Keppra) 500 mg tablet  Spouse/Significant Other  No  No  Sig: Take 1 tablet (500 mg total) by mouth every 12 (twelve) hours Do not start before 2023.  Patient not taking: Reported on 3/22/2024  metoprolol succinate (TOPROL-XL) 25 mg 24 hr tablet  Spouse/Significant Other  No  No  Sig: TAKE 1 TABLET BY MOUTH EVERY DAY  multivitamin (THERAGRAN) TABS  Spouse/Significant Other  Yes  No  Sig: Take 1 tablet by mouth daily.  Patient not taking: Reported on 2024  nitroglycerin (NITROSTAT) 0.4 mg SL tablet  Spouse/Significant Other  No  No  Sig: Place 1 tablet (0.4 mg total) under the tongue every 5 (five) minutes as needed for chest pain  polyethylene glycol (MIRALAX) 17 g packet  Spouse/Significant Other  No  No  Sig: Take 17 g by mouth daily as needed (Constipation) for up to 5 days  prasugrel (EFFIENT) tablet  Spouse/Significant Other  No  No  Si tablet daily  rivaroxaban (Xarelto) 15 mg tablet  Spouse/Significant Other  No  No  Sig: Take 1 tablet (15 mg total) by mouth daily with breakfast  senna (SENOKOT) 8.6 mg  Spouse/Significant Other  No  No  Sig: Take 2 tablets (17.2 mg total) by mouth daily for 7 days Do not start before 2023.  sertraline (ZOLOFT) 25 mg tablet  Spouse/Significant Other  No  No  Sig: TAKE 1 TABLET BY MOUTH EVERY DAY  Facility-Administered Medications: None      Past Medical History:  Diagnosis  Date  •  Anal fissure  •  Cardiac disorder  •  Cognitive changes  2020  •  Esophageal reflux  12/15/2023  •  Esophageal reflux  12/15/2023  •  Esophagitis, reflux  •  Hemorrhoids  •  Hepatic hemangioma  Last Assessed: 2015  •  Herpes zoster  •  History of colonic polyps  •  Hypertension  •  Ischemic colitis (HCC)  •  Lumbar herniated disc  •  Malignant neoplasm without specification of site (HCC)  •  Nephrolithiasis  L. Lithotripsy  •  Nontoxic single thyroid nodule  Last Assessed:  1/13/2015  •  Osteoarthritis  •  Overactive bladder  •  Raynaud disease  •  Respiratory system disease  •  Sjogren's disease (HCC)  •  Spinal stenosis  •  PONCHO (stress urinary incontinence, female)  •  Uterovaginal prolapse  Grade I-II      Past Surgical History:  Procedure  Laterality  Date  •  APPENDECTOMY  1947  •  CARDIAC PACEMAKER PLACEMENT  01/2021  •  CARDIAC SURGERY  CABG  •  CATARACT EXTRACTION  Bilateral  •  COLONOSCOPY  2012  Fiberoptic  •  COLONOSCOPY  Resolved: 2006 - 2012 5 year f/u  •  CORONARY ANGIOPLASTY WITH STENT PLACEMENT  •  CORONARY ARTERY BYPASS GRAFT  Resolved: 2012  •  ESOPHAGOGASTRODUODENOSCOPY  2012  Diagnostic  •  HEMORROIDECTOMY  •  KNEE SURGERY  •  LITHOTRIPSY  Renal  •  MALIGNANT SKIN LESION EXCISION  Face; Resolved: 2004  •  OR ESOPHAGOGASTRODUODENOSCOPY TRANSORAL DIAGNOSTIC  N/A  4/13/2016  Procedure: EGD AND COLONOSCOPY;  Surgeon: Evelin Bone MD;  [facility]: [facility];  Service: Gastroenterology  •  RENAL ARTERY STENT  •  SKIN LESION EXCISION  Scalp  •  SOFT TISSUE TUMOR RESECTION  Shoulder; Resolved: 1995  •  SPLIT THICKNESS SKIN GRAFT  Right  12/14/2023  Procedure: SKIN GRAFT SPLIT THICKNESS (STSG)  EXTREMITY; VAC application;  Surgeon: Ap Brito DO;  Location: [facility];  Service: General  •  THROMBOLYSIS  Postoperative Thrombolysis PTCA  •  TONSILLECTOMY  Resolved: 1944  •  WOUND DEBRIDEMENT  Right  12/8/2023  Procedure: DEBRIDEMENT LOWER EXTREMITY (WASH OUT); POSSIBLE VAC APPLICATION;  Surgeon: Abhijeet Davies MD;  Location: [facility];  Service: General      Family History  Problem  Relation  Age of Onset  •  Heart disease  Mother  •  Hypertension  Mother  •  Osteoporosis  Mother  •  Heart disease  Father  •  Hypertension  Father  •  No Known Problems  Sister  •  Heart disease  Brother  •  Diabetes  Brother  •  No Known Problems  Daughter  •  No Known Problems  Son  •  No Known Problems  Maternal Grandmother  •  No Known Problems  Maternal Grandfather  •  No  Known Problems  Paternal Grandmother  •  No Known Problems  Paternal Grandfather  •  Ulcerative colitis  Family  •  Colon polyps  Family  •  Breast cancer  Neg Hx  •  Colon cancer  Neg Hx  •  Ovarian cancer  Neg Hx    I have reviewed and agree with the history as documented.    E-Cigarette/Vaping  •  E-Cigarette Use  Never User    E-Cigarette/Vaping Substances  •  Nicotine  No  •  THC  No  •  CBD  No  •  Flavoring  No  •  Other  No  •  Unknown  No    Social History    Tobacco Use  •  Smoking status:  Never  •  Smokeless tobacco:  Never  Vaping Use  •  Vaping status:  Never Used  Substance Use Topics  •  Alcohol use:  Yes  Alcohol/week:  1.0 standard drink of alcohol  Types:  1 Glasses of wine per week  Comment: occasionally, but no alcohol intake recently  •  Drug use:  Never       Review of Systems   Constitutional:  Positive for fatigue. Negative for chills and fever.   HENT:  Negative for ear pain and sore throat.    Eyes:  Negative for pain and visual disturbance.   Respiratory:  Negative for cough, shortness of breath and wheezing.    Cardiovascular:  Negative for chest pain, palpitations and leg swelling.   Gastrointestinal:  Positive for abdominal distention, abdominal pain, constipation, nausea and vomiting. Negative for blood in stool and diarrhea.   Genitourinary:  Negative for difficulty urinating, dysuria, flank pain, frequency, hematuria and pelvic pain.   Musculoskeletal:  Negative for arthralgias and back pain.   Skin:  Negative for color change and rash.   Neurological:  Negative for seizures and syncope.   All other systems reviewed and are negative.      Physical Exam  ED Triage Vitals [07/05/24 1645]  Temperature  Pulse  Respirations  Blood Pressure  SpO2  97.5 °F (36.4 °C)  72  18  108/70  99 %  Temp Source  Heart Rate Source  Patient Position - Orthostatic VS  BP Location  FiO2 (%)  Oral  Monitor  Lying  Right arm  --  Pain Score  8        Orthostatic Vital Signs  Vitals:  07/05/24  1645  BP:  108/70  Pulse:  72  Patient Position - Orthostatic VS:  Lying      Physical Exam  Vitals and nursing note reviewed.   Constitutional:       General: She is not in acute distress.     Appearance: She is well-developed. She is ill-appearing. She is not toxic-appearing or diaphoretic.   HENT:      Head: Normocephalic and atraumatic.   Eyes:      Conjunctiva/sclera: Conjunctivae normal.   Cardiovascular:      Rate and Rhythm: Normal rate and regular rhythm.      Heart sounds: Normal heart sounds. No murmur heard.  Pulmonary:      Effort: Pulmonary effort is normal. No respiratory distress.      Breath sounds: Normal breath sounds.   Abdominal:      General: Abdomen is protuberant. Bowel sounds are decreased. There is distension.      Palpations: Abdomen is soft. There is no fluid wave, hepatomegaly, splenomegaly, mass or pulsatile mass.      Tenderness: There is abdominal tenderness in the right lower quadrant, suprapubic area and left lower quadrant. There is no guarding or rebound. Negative signs include Deng's sign and Rovsing's sign.   Musculoskeletal:         General: No swelling.      Cervical back: Neck supple.   Skin:     General: Skin is warm and dry.      Capillary Refill: Capillary refill takes less than 2 seconds.   Neurological:      Mental Status: She is alert.   Psychiatric:         Mood and Affect: Mood is depressed.       ED Medications  Medications  ondansetron (ZOFRAN) injection 4 mg (4 mg Intravenous Given 7/5/24 1800)  sodium chloride 0.9 % bolus 1,000 mL (1,000 mL Intravenous New Bag 7/5/24 1800)  HYDROmorphone (DILAUDID) injection 0.5 mg (0.5 mg Intravenous Given 7/5/24 1800)      Diagnostic Studies  Results Reviewed     Procedure  Component  Value  Units  Date/Time  CBC and differential [[id number]]  Collected: 07/05/24 1759  Lab Status: No result  Specimen: Blood from Arm, Right  CMP [[id number]]  Collected: 07/05/24 1759  Lab Status: No result  Specimen: Blood from Arm, Right  Lipase  "[[id number]]  Collected: 07/05/24 1759  Lab Status: No result  Specimen: Blood from Arm, Right  Magnesium [[id number]]  Collected: 07/05/24 1759  Lab Status: No result  Specimen: Blood from Arm, Right  Lactic acid, plasma (w/reflex if result > 2.0) [[id number]]  Collected: 07/05/24 1759  Lab Status: No result  Specimen: Blood from Arm, Right  HS Troponin 0hr (reflex protocol) [[id number]]  Collected: 07/05/24 1759  Lab Status: No result  Specimen: Blood from Arm, Right  UA w Reflex to Microscopic w Reflex to Culture [[id number]]  Lab Status: No result  Specimen: Urine               CT Abdomen pelvis with contrast    (Results Pending)        Procedures  ECG 12 Lead Documentation Only    Date/Time: 7/5/2024 9:47 PM    Performed by: Be Chatterjee DO  Authorized by: Be Chatterjee DO    Indications / Diagnosis:  Extensive cardiac history with advanced age  ECG reviewed by me, the ED Provider: yes    Previous ECG:     Previous ECG:  Compared to current  Rhythm:     Rhythm: paced    Pacing:     Capture:  Complete    Type of pacing:  Atrial and ventricular  Ectopy:     Ectopy: none    QRS:     QRS axis:  Normal    QRS intervals:  Wide  Conduction:     Conduction: normal    ST segments:     ST segments:  Non-specific  T waves:     T waves: normal          ED Course  ED Course as of 07/05/24 2147 Fri Jul 05, 2024 2109 CT Abdomen pelvis with contrast     Patient is an 84 yo female with past medical history of CAD w/ CABG and cardiac stent, Afib on Xralto with implanted pacemaker, hypertension, and ischemic colitis, past surgical history of appendectomy and hemmorroidectomy, who presents with a 1 day history of sudden onset lower abdominal pain accompanied with nausea and vomiting. Patient states that she has felt \"constipated\" over the past few days and cannot remember when her last BM was, but that the pain and nausea began this morning. She endorses that the pain is constant but that it will \"spike\" to a 10/10 pain, and localizes " it to her LLQ which radiates to her RLQ. She thinks she may have had a diverticulosis at some point in the past but is unsure. She denies any urinary retention or burning, CP, SOB, blood in stool or urine, fever, or diarrhea. Patient received labwork including CBC, CMP, lipase, magnesium, lactate, troponin, UA, urine culture. 12 lead EKG was performed showing a paced rhythm which was consistent with previous EKGs. Patient received IV Zofran and dilaudid which helped to alleviate her pain and nausea. Patient was found to be hypokalemic with a serum potassium of 2.5. She received potassium repletion with 20 mEq IV potassium chloride. At rest, patient's SpO2 was found to drop into the mid 80s and she was placed on 2 L/min oxygen via nasal cannula. Patient underwent an abdominal CT with IV contrast which revealed abundant stool and gas throughout the colon but no diverticulitis, diverticulosis, small bowel obstruction, perforation, or any other emergent pathology. Details of the patient's disposition are discussed below.              Medical Decision Making  Patient is a 83 y.o. female  who presents to the ED with abdominal pain. Patient is a poor historian but endorses that she first noticed pain this morning which was accompanied by nausea and vomiting. Details of patient's presentation, exam and treatment course are discussed above.    Vital signs were stable throughout admission. Exam as listed above    Differential diagnosis includes but is not limited to constipation, stercoral colitis, ischemic colitis, small bowel obstruction, diverticulosis, diverticulitis.    Plan:        All labs reviewed and utilized in the medical decision making process  All radiology studies independently viewed by me and interpreted by the radiologist.  I reviewed all testing with the patient.         Amount and/or Complexity of Data Reviewed  Labs: ordered.  Radiology: ordered. Decision-making details documented in ED  Course.    Risk  Prescription drug management.        Disposition  Final diagnoses:  None    ED Disposition     None      Follow-up Information   None      Patient's Medications  Discharge Prescriptions  No medications on file    No discharge procedures on file.    PDMP Review        Value  Time  User  PDMP Reviewed   Yes  12/7/2023  4:31 PM  Be Banks PA-C         ED Provider  Attending physically available and evaluated Verito Fallon. I managed the patient along with the ED Attending.    Electronically Signed by         "the pain is constant but that it will \"spike\" to a 10/10 pain, and localizes it to her LLQ which radiates to her RLQ. She thinks she may have had a diverticulosis at some point in the past but is unsure. She denies any urinary retention or burning, CP, SOB, blood in stool or urine, fever, or diarrhea. Patient received labwork including CBC, CMP, lipase, magnesium, lactate, troponin, UA, urine culture. 12 lead EKG was performed showing a paced rhythm which was consistent with previous EKGs. Patient received IV Zofran and dilaudid which helped to alleviate her pain and nausea. Patient was found to be hypokalemic with a serum potassium of 2.5. She received potassium repletion with 20 mEq IV potassium chloride. At rest, patient's SpO2 was found to drop into the mid 80s and she was placed on 2 L/min oxygen via nasal cannula. Patient underwent an abdominal CT with IV contrast which revealed abundant stool and gas throughout the colon but no diverticulitis, diverticulosis, small bowel obstruction, perforation, or any other emergent pathology. Details of the patient's disposition are discussed below.    SBIRT 22yo+      Flowsheet Row Most Recent Value   Initial Alcohol Screen: US AUDIT-C     1. How often do you have a drink containing alcohol? 0 Filed at: 07/05/2024 1940   2. How many drinks containing alcohol do you have on a typical day you are drinking?  0 Filed at: 07/05/2024 1940   3a. Male UNDER 65: How often do you have five or more drinks on one occasion? 0 Filed at: 07/05/2024 1940   3b. FEMALE Any Age, or MALE 65+: How often do you have 4 or more drinks on one occassion? 0 Filed at: 07/05/2024 1940   Audit-C Score 0 Filed at: 07/05/2024 1940   DOREEN: How many times in the past year have you...    Used an illegal drug or used a prescription medication for non-medical reasons? Never Filed at: 07/05/2024 1940                  Medical Decision Making  Patient is a 83 y.o. female  who presents to the ED with abdominal " pain. Patient is a poor historian but endorses that she first noticed pain this morning which was accompanied by nausea and vomiting. Details of patient's presentation, exam and treatment course are discussed above.    Vital signs were stable throughout admission. Exam as listed above    Differential diagnosis includes but is not limited to constipation, stercoral colitis, ischemic colitis, small bowel obstruction, diverticulosis, diverticulitis.    Plan:  Patient is to be discharged home to follow up with her PCP.      All labs reviewed and utilized in the medical decision making process  All radiology studies independently viewed by me and interpreted by the radiologist.  I reviewed all testing with the patient.         Amount and/or Complexity of Data Reviewed  Labs: ordered.  Radiology: ordered. Decision-making details documented in ED Course.    Risk  Prescription drug management.          Disposition  Final diagnoses:   Other constipation   Hypokalemia     Time reflects when diagnosis was documented in both MDM as applicable and the Disposition within this note       Time User Action Codes Description Comment    7/5/2024 10:55 PM Marissa Brannon Add [K59.09] Other constipation     7/5/2024 10:55 PM Marissa Brannon Add [E87.6] Hypokalemia           ED Disposition       ED Disposition   Discharge    Condition   Stable    Date/Time   Fri Jul 5, 2024 3953    Comment   Verito Fallon discharge to home/self care.                   Follow-up Information       Follow up With Specialties Details Why Contact Info    Robbie Warner DO Family Medicine   [address] [place] [id number]  [phone]              Discharge Medication List as of 7/5/2024 11:00 PM        CONTINUE these medications which have CHANGED    Details   polyethylene glycol (MIRALAX) 17 g packet Take 17 g by mouth daily for 7 days, Starting Fri 7/5/2024, Until Fri 7/12/2024, Normal           CONTINUE these medications which  have NOT CHANGED    Details   acetaminophen (TYLENOL) 325 mg tablet Take 2 tablets (650 mg total) by mouth every 4 (four) hours as needed for mild pain, Starting Mon 11/13/2023, No Print      amiodarone 200 mg tablet TAKE 1/2 TABLET (100 MG TOTAL) BY MOUTH DAILY WITH BREAKFAST, Normal      atorvastatin (LIPITOR) 20 mg tablet TAKE 1 TABLET BY MOUTH EVERY DAY WITH DINNER, Normal      bumetanide (BUMEX) 2 mg tablet TAKE 1 TABLET BY MOUTH EVERY DAY, Normal      Debrox 6.5 % otic solution ADMINISTER 5 DROPS TO THE RIGHT EAR 2 (TWO) TIMES A DAY FOR 4 DAYS, Normal      levETIRAcetam (Keppra) 500 mg tablet Take 1 tablet (500 mg total) by mouth every 12 (twelve) hours Do not start before December 31, 2023., Starting Sun 12/31/2023, Normal      metoprolol succinate (TOPROL-XL) 25 mg 24 hr tablet TAKE 1 TABLET BY MOUTH EVERY DAY, Normal      multivitamin (THERAGRAN) TABS Take 1 tablet by mouth daily., Historical Med      nitroglycerin (NITROSTAT) 0.4 mg SL tablet Place 1 tablet (0.4 mg total) under the tongue every 5 (five) minutes as needed for chest pain, Starting Tue 2/23/2021, Normal      prasugrel (EFFIENT) tablet 1 tablet daily, Normal      rivaroxaban (Xarelto) 15 mg tablet Take 1 tablet (15 mg total) by mouth daily with breakfast, Starting Mon 12/4/2023, Normal      senna (SENOKOT) 8.6 mg Take 2 tablets (17.2 mg total) by mouth daily for 7 days Do not start before November 14, 2023., Starting Tue 11/14/2023, Until Fri 3/22/2024, Normal      sertraline (ZOLOFT) 25 mg tablet TAKE 1 TABLET BY MOUTH EVERY DAY, Normal           No discharge procedures on file.    PDMP Review         Value Time User    PDMP Reviewed  Yes 12/7/2023  4:31 PM Be Banks PA-C             ED Provider  Attending physically available and evaluated Verito Fallon. I managed the patient along with the ED Attending.    Electronically Signed by Be Chavez DO, PGY1           Be Chatterjee DO  07/17/24 3164

## 2024-07-06 NOTE — ED PROVIDER NOTES
Emergency Department Sign Out Note        Sign out and transfer of care from Dr. Chatterjee. See Separate Emergency Department note.     The patient, Verito Fallon, was evaluated by the previous provider for abdominal pain.    Workup Completed:  Urinalysis, CBC, CMP, troponin, lipase, lactic acid.    ED Course / Workup Pending (followup):  CT scan of the abdomen        Identification of Seniors at Risk      Flowsheet Row Most Recent Value   (ISAR) Identification of Seniors at Risk    Before the illness or injury that brought you to the Emergency, did you need someone to help you on a regular basis? 0 Filed at: 07/05/2024 1649   In the last 24 hours, have you needed more help than usual? 0 Filed at: 07/05/2024 1649   Have you been hospitalized for one or more nights during the past 6 months? 0 Filed at: 07/05/2024 1649   In general, do you see well? 0 Filed at: 07/05/2024 1649   In general, do you have serious problems with your memory? 1 Filed at: 07/05/2024 1649   Do you take more than three different medications every day? 1 Filed at: 07/05/2024 1649   ISAR Score 2 Filed at: 07/05/2024 1649                                  ED Course as of 07/06/24 0647   Fri Jul 05, 2024 2226 S/o: hx of CABG, afib with PM, ischemic colitis. Lower abd pain + vomiting. Hx of multiple abd surgeries. Hypokalemia. Pending CT reading-r/o SBO, diverticulitis.  If CT scan showing no acute pathology and need for admission, can order p.o. potassium and discharge.   2245 CT scan per Vrad no acute SBO.  Showing cholelithiasis with signs of colitis and constipation.   2256 Discharged at this time, given prescription for MiraLAX.  Instructed to follow-up with PCP next week regarding potassium recheck.  Patient given return precautions.  Patient and daughter report understanding, all questions answered.     Procedures  Medical Decision Making  Amount and/or Complexity of Data Reviewed  Labs: ordered.  Radiology: ordered.    Risk  Prescription drug  management.            Disposition  Final diagnoses:   Other constipation   Hypokalemia     Time reflects when diagnosis was documented in both MDM as applicable and the Disposition within this note       Time User Action Codes Description Comment    7/5/2024 10:55 PM Marissa Brannon [K59.09] Other constipation     7/5/2024 10:55 PM Marissa Brannon [E87.6] Hypokalemia           ED Disposition       ED Disposition   Discharge    Condition   Stable    Date/Time   Fri Jul 5, 2024 6974    Comment   Verito Fallon discharge to home/self care.                   Follow-up Information       Follow up With Specialties Details Why Contact Info    Robbie Warner DO Family Medicine   [address] [place] [id number] [phone]            Discharge Medication List as of 7/5/2024 11:00 PM        CONTINUE these medications which have CHANGED    Details   polyethylene glycol (MIRALAX) 17 g packet Take 17 g by mouth daily for 7 days, Starting Fri 7/5/2024, Until Fri 7/12/2024, Normal           CONTINUE these medications which have NOT CHANGED    Details   acetaminophen (TYLENOL) 325 mg tablet Take 2 tablets (650 mg total) by mouth every 4 (four) hours as needed for mild pain, Starting Mon 11/13/2023, No Print      amiodarone 200 mg tablet TAKE 1/2 TABLET (100 MG TOTAL) BY MOUTH DAILY WITH BREAKFAST, Normal      atorvastatin (LIPITOR) 20 mg tablet TAKE 1 TABLET BY MOUTH EVERY DAY WITH DINNER, Normal      bumetanide (BUMEX) 2 mg tablet TAKE 1 TABLET BY MOUTH EVERY DAY, Normal      Debrox 6.5 % otic solution ADMINISTER 5 DROPS TO THE RIGHT EAR 2 (TWO) TIMES A DAY FOR 4 DAYS, Normal      levETIRAcetam (Keppra) 500 mg tablet Take 1 tablet (500 mg total) by mouth every 12 (twelve) hours Do not start before December 31, 2023., Starting Sun 12/31/2023, Normal      metoprolol succinate (TOPROL-XL) 25 mg 24 hr tablet TAKE 1 TABLET BY MOUTH EVERY DAY, Normal      multivitamin (THERAGRAN) TABS Take 1 tablet by  mouth daily., Historical Med      nitroglycerin (NITROSTAT) 0.4 mg SL tablet Place 1 tablet (0.4 mg total) under the tongue every 5 (five) minutes as needed for chest pain, Starting Tue 2/23/2021, Normal      prasugrel (EFFIENT) tablet 1 tablet daily, Normal      rivaroxaban (Xarelto) 15 mg tablet Take 1 tablet (15 mg total) by mouth daily with breakfast, Starting Mon 12/4/2023, Normal      senna (SENOKOT) 8.6 mg Take 2 tablets (17.2 mg total) by mouth daily for 7 days Do not start before November 14, 2023., Starting Tue 11/14/2023, Until Fri 3/22/2024, Normal      sertraline (ZOLOFT) 25 mg tablet TAKE 1 TABLET BY MOUTH EVERY DAY, Normal           No discharge procedures on file.       ED Provider  Electronically Signed by     Marissa Brannon MD  07/06/24 0600

## 2024-07-06 NOTE — DISCHARGE INSTRUCTIONS
You have been seen in the emergency department for evaluation of abdominal pain and vomiting.  Your CT scan was showing inflammation of the colon, but no obstruction.  It is likely that you have constipation.  For this we have prescribed MiraLAX, please take daily to encourage bowel movements.  Additionally, you are found to have low potassium today.  Please follow-up with your doctor next week to have potassium rechecked.

## 2024-07-08 ENCOUNTER — PATIENT OUTREACH (OUTPATIENT)
Dept: CASE MANAGEMENT | Facility: OTHER | Age: 84
End: 2024-07-08

## 2024-07-08 ENCOUNTER — VBI (OUTPATIENT)
Dept: FAMILY MEDICINE CLINIC | Facility: MEDICAL CENTER | Age: 84
End: 2024-07-08

## 2024-07-08 DIAGNOSIS — Z71.89 COMPLEX CARE COORDINATION: Primary | ICD-10-CM

## 2024-07-08 NOTE — TELEPHONE ENCOUNTER
07/08/24 11:11 AM    Patient contacted post ED visit, [facility] department spoke with patient/caregiver and outreach was successful.    Thank you.  Evelyn Marroquin MA  [facility]

## 2024-07-10 ENCOUNTER — TELEPHONE (OUTPATIENT)
Age: 84
End: 2024-07-10

## 2024-07-10 ENCOUNTER — TELEPHONE (OUTPATIENT)
Dept: FAMILY MEDICINE CLINIC | Facility: MEDICAL CENTER | Age: 84
End: 2024-07-10

## 2024-07-10 NOTE — TELEPHONE ENCOUNTER
Melyssa called in to see if Dr Jiang could fill out some paperwork for her mother Verito for a nursing home. She stated that it would be faxed over. At this time she is keeping her AWV, but will call back if anything changes. Please Advise Thank you

## 2024-07-11 ENCOUNTER — PATIENT OUTREACH (OUTPATIENT)
Dept: FAMILY MEDICINE CLINIC | Facility: MEDICAL CENTER | Age: 84
End: 2024-07-11

## 2024-07-11 NOTE — PROGRESS NOTES
"Received request from PCP for complex care management. Telephone call to daughter Melyssa. Introduced self and role of complex care manager.  She reports that the family has increased concerns of patients ability to live on her own.  Patients spouse is presently in the hospital and as per daughter placed on \"comfort measures\".  Daughter reports that Verito has \"memory issues\" but insistent on \"staying in her home\".  Daughter and granddaughter check on her daily, assist with meals and transportation. Had stove disconnected several days ago and patient has yet \"to notice\". Concerned for her ability to remember to eat.  Daughter has contacted [facility] and is initiating paper work for placement however, her mother is resistant.  Daughter does not have POA.  Daughter shared that spouse is \"worried about her\".  Had long supportive discussion with daughter, she is unsure when she will have her transition to [facility].  Provided information on [facility], their office is within [facility].  Provided telephone number and email.  Consider in home caregivers to bridge to placement.  Also discussed resources such as life alert button, in home ring cameras, Meals on Wheels and Moms meals.  At present time, per daughter, Verito has not been deemed \"incompetent\".   Verito wishes to review the above information and resources provided and will contact this RN CM as needed.  Provided my contact information. Also advised of role of \[facility]\""  if assistance would be needed with long term care placement.  "

## 2024-07-15 ENCOUNTER — RA CDI HCC (OUTPATIENT)
Dept: OTHER | Facility: HOSPITAL | Age: 84
End: 2024-07-15

## 2024-07-15 NOTE — PROGRESS NOTES
HCC coding opportunities          Chart Reviewed number of suggestions sent to Provider: 2     Patients Insurance   I13.0 and I70.219  Medicare Insurance: Medicare

## 2024-07-25 ENCOUNTER — PATIENT OUTREACH (OUTPATIENT)
Dept: CASE MANAGEMENT | Facility: OTHER | Age: 84
End: 2024-07-25

## 2024-07-25 NOTE — PROGRESS NOTES
Follow up call to patients daughter Melyssa, denies need for further assistance at this time.  Her mother remains at home. Father is presently at [facility].    Will close to complex care management, remove self from care team. Family had been provided my contact information if future assistance would be needed.

## 2024-08-02 ENCOUNTER — TELEPHONE (OUTPATIENT)
Dept: FAMILY MEDICINE CLINIC | Facility: MEDICAL CENTER | Age: 84
End: 2024-08-02

## 2024-08-02 DIAGNOSIS — Z11.1 SCREENING FOR TUBERCULOSIS: Primary | ICD-10-CM

## 2024-08-06 NOTE — PROGRESS NOTES
Cardiology Follow Up    Verito Fallon  1940  [id number]  [facility] [place]  [address]  [place] [address] [phone]    1. Coronary artery disease involving native heart without angina pectoris, unspecified vessel or lesion type  Hepatic function panel    TSH, 3rd generation with Free T4 reflex    atorvastatin (LIPITOR) 20 mg tablet      2. Essential hypertension  Hepatic function panel    TSH, 3rd generation with Free T4 reflex      3. Atrial fibrillation, unspecified type (HCC)  Hepatic function panel    TSH, 3rd generation with Free T4 reflex      4. Presence of permanent cardiac pacemaker  Hepatic function panel    TSH, 3rd generation with Free T4 reflex      5. Hx of CABG  Hepatic function panel    TSH, 3rd generation with Free T4 reflex      6. A-fib (HCC)  Hepatic function panel    TSH, 3rd generation with Free T4 reflex    amiodarone 200 mg tablet    metoprolol succinate (TOPROL-XL) 25 mg 24 hr tablet      7. Coronary arteriosclerosis  Hepatic function panel    TSH, 3rd generation with Free T4 reflex    rivaroxaban (Xarelto) 15 mg tablet      8. Acute on chronic combined systolic and diastolic congestive heart failure (HCC)        9. Chronic kidney disease, stage 3b (HCC)            Interval History:Patient is here for f/u visit.  She had CABG x 4, 72011 with closure of the LIMA to the LAD documented thereafter and revascularization of the LAD and the diagonal branch with PCI.  Cardiac catheterization 1/2021 culminated in PCI in the proximal LAD with placement of a LAMINE and PCI of the ostial Lmain with placement of a LAMINE.  SVG's to the first diagonal and first OMB were closed.  Graft to the distal RCA had 50% stenosis.  Dual chamber PPM with deep septal lead to preserve narrow QRS was placed 1/2021. Interrogation done in 1/2024 demonstrated an appropriately functioning [employer] device.  Patient was followed by EP  and seen 4/2022. Remains on Amiodarone related to PAF.  Echocardiogram 1/2021 demonstrated LVEF of 50% with mild to moderate MR. There was no significant change compared to 12/2020.  Her vital signs are stable today.  She has had no chest pain or significant dyspnea.  Patient's HTN and CAD are stable on her current medicine.    Patient Active Problem List   Diagnosis   • 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)   • A-fib (HCC)   • PAD (peripheral artery disease) ([facility])   • Hyperlipidemia   • Toxic metabolic encephalopathy   • Depression   • Current use of long term anticoagulation   • Long term current use of amiodarone   • Renal artery stenosis (HCC)   • Pulmonary hypertension (HCC)   • Sick sinus syndrome (HCC)   • Hx of CABG   • CAD (coronary artery disease)   • Cognitive impairment   • Atherosclerosis with claudication of extremity ([facility])   • Carotid stenosis, asymptomatic, bilateral   • Cervical disc disorder with radiculopathy of mid-cervical region   • Cervical spondylosis   • Pruritic rash   • Leg hematoma, right, subsequent encounter   • History of seizure   • At risk for constipation   • At risk for delirium   • Advance care planning   • Hyperkalemia   • Hypokalemia   • Chronic kidney disease, stage 3b ([facility])   • Osteoporosis   • Presence of permanent cardiac pacemaker   • Hypertensive heart and 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 ([facility])   • Acute on chronic combined systolic and diastolic congestive heart failure (HCC)     Past Medical History:   Diagnosis Date   • Abnormal liver enzymes 01/21/2021   • Acute (reversible) ischemia of small intestine, extent unspecified ([facility]) 01/14/2022   • Anal fissure    • Cardiac disorder    • Closed fracture of fifth metacarpal bone of right hand 11/13/2023   • Closed nondisplaced fracture of fifth metacarpal bone of right hand, unspecified portion of metacarpal, initial encounter 11/17/2023   • Cognitive changes 12/23/2020   • Edema of  left upper extremity 01/22/2021   • Esophageal reflux 12/15/2023   • Esophageal reflux 12/15/2023   • Esophagitis, reflux    • Fall 10/04/2022   • Hemorrhoids    • Hepatic hemangioma     Last Assessed: 1/13/2015   • Herpes zoster    • History of colonic polyps    • Hypertension    • Ischemic colitis (HCC)    • Lumbar herniated disc    • Malignant neoplasm without specification of site ([facility])    • Memory loss 05/03/2021   • Multiple contusions 11/12/2023   • Nephrolithiasis     L. 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   • Urinary retention 01/22/2021   • Uterovaginal prolapse     Grade I-II   • Wound of right leg 12/22/2022     Social History     Socioeconomic History   • Marital status: /Civil Union     Spouse name: Not on file   • Number of children: Not on file   • Years of education: Not on file   • Highest education level: Not on file   Occupational History   • Occupation: retired   Tobacco Use   • Smoking status: Never   • Smokeless tobacco: Never   Vaping Use   • Vaping status: Never Used   Substance and Sexual Activity   • Alcohol use: Yes     Alcohol/week: 1.0 standard drink of alcohol     Types: 1 Glasses of wine per week     Comment: occasionally, but no alcohol intake recently   • Drug use: Never   • Sexual activity: Not Currently   Other Topics Concern   • Not on file   Social History Narrative    Activities: golf    Caffeine use    Consumes healthy diet    Daily caffeinated coffee consumption: 1 cup per day    Drinks caffeinated tea: 1 cup per day    Well balanced diet     Social Determinants of Health     Financial Resource Strain: Low Risk  (7/18/2023)    Overall Financial Resource  Strain (CARDIA)    • Difficulty of Paying Living Expenses: Not hard at all   Food Insecurity: No Food Insecurity (12/8/2023)    Hunger Vital Sign    • Worried About Running Out of Food in the Last Year: Never true    • Ran Out of Food in the Last Year: Never true   Transportation Needs: No Transportation Needs (12/8/2023)    PRAPARE - Transportation    • Lack of Transportation (Medical): No    • Lack of Transportation (Non-Medical): No   Physical Activity: Not on file   Stress: Not on file   Social Connections: Unknown (6/18/2024)    Received from [employer]    Social ESP Systems    • How often do you feel lonely or isolated from those around you? (Adult - for ages 18 years and over): Not on file   Intimate Partner Violence: Not on file   Housing Stability: Low Risk  (12/8/2023)    Housing Stability Vital Sign    • Unable to Pay for Housing in the Last Year: No    • Number of Times Moved in the Last Year: 1    • Homeless in the Last Year: No      Family History   Problem Relation Age of Onset   • Heart disease Mother    • Hypertension Mother    • Osteoporosis Mother    • Heart disease Father    • Hypertension Father    • No Known Problems Sister    • Heart disease Brother    • Diabetes Brother    • No Known Problems Daughter    • No Known Problems Son    • No Known Problems Maternal Grandmother    • No Known Problems Maternal Grandfather    • No Known Problems Paternal Grandmother    • No Known Problems Paternal Grandfather    • Ulcerative colitis Family    • Colon polyps Family    • Breast cancer Neg Hx    • Colon cancer Neg Hx    • Ovarian cancer Neg Hx      Past Surgical History:   Procedure Laterality Date   • APPENDECTOMY  1947   • CARDIAC PACEMAKER PLACEMENT  01/2021   • CARDIAC SURGERY      CABG   • CATARACT EXTRACTION Bilateral    • COLONOSCOPY  2012    Fiberoptic   • COLONOSCOPY      Resolved: 2006 - 2012 5 year f/u   • CORONARY ANGIOPLASTY WITH STENT PLACEMENT     • CORONARY ARTERY BYPASS GRAFT       Resolved: 2012   • ESOPHAGOGASTRODUODENOSCOPY  2012    Diagnostic   • HEMORROIDECTOMY     • KNEE SURGERY     • LITHOTRIPSY      Renal   • MALIGNANT SKIN LESION EXCISION      Face; Resolved: 2004   • NH ESOPHAGOGASTRODUODENOSCOPY TRANSORAL DIAGNOSTIC N/A 4/13/2016    Procedure: EGD AND COLONOSCOPY;  Surgeon: Evelin Bone MD;  [facility]: [facility];  Service: Gastroenterology   • RENAL ARTERY STENT     • SKIN LESION EXCISION      Scalp   • SOFT TISSUE TUMOR RESECTION      Shoulder; Resolved: 1995   • SPLIT THICKNESS SKIN GRAFT Right 12/14/2023    Procedure: SKIN GRAFT SPLIT THICKNESS (STSG)  EXTREMITY; VAC application;  Surgeon: Ap Brito DO;  Location: [facility];  Service: General   • THROMBOLYSIS      Postoperative Thrombolysis PTCA   • TONSILLECTOMY      Resolved: 1944   • WOUND DEBRIDEMENT Right 12/8/2023    Procedure: DEBRIDEMENT LOWER EXTREMITY (WASH OUT); POSSIBLE VAC APPLICATION;  Surgeon: Abhijeet Davies MD;  Location: [facility];  Service: General       Current Outpatient Medications:   •  amiodarone 200 mg tablet, Take 0.5 tablets (100 mg total) by mouth daily, Disp: 45 tablet, Rfl: 3  •  atorvastatin (LIPITOR) 20 mg tablet, Take 1 tablet (20 mg total) by mouth daily with dinner, Disp: 90 tablet, Rfl: 4  •  metoprolol succinate (TOPROL-XL) 25 mg 24 hr tablet, Take 1 tablet (25 mg total) by mouth daily, Disp: 30 tablet, Rfl: 8  •  rivaroxaban (Xarelto) 15 mg tablet, Take 1 tablet (15 mg total) by mouth daily with breakfast, Disp: 90 tablet, Rfl: 3  •  acetaminophen (TYLENOL) 325 mg tablet, Take 2 tablets (650 mg total) by mouth every 4 (four) hours as needed for mild pain, Disp: , Rfl: 0  •  bumetanide (BUMEX) 2 mg tablet, TAKE 1 TABLET BY MOUTH EVERY DAY, Disp: 30 tablet, Rfl: 8  •  Debrox 6.5 % otic solution, ADMINISTER 5 DROPS TO THE RIGHT EAR 2 (TWO) TIMES A DAY FOR 4 DAYS, Disp: 15 mL, Rfl: 0  •  levETIRAcetam (Keppra) 500 mg tablet, Take 1 tablet (500 mg total) by mouth every 12 (twelve)  hours Do not start before December 31, 2023. (Patient not taking: Reported on 3/22/2024), Disp: 60 tablet, Rfl: 0  •  multivitamin (THERAGRAN) TABS, Take 1 tablet by mouth daily. (Patient not taking: Reported on 1/2/2024), Disp: , Rfl:   •  nitroglycerin (NITROSTAT) 0.4 mg SL tablet, Place 1 tablet (0.4 mg total) under the tongue every 5 (five) minutes as needed for chest pain, Disp: 25 tablet, Rfl: 0  •  polyethylene glycol (MIRALAX) 17 g packet, Take 17 g by mouth daily as needed (Constipation) for up to 5 days, Disp: 85 g, Rfl: 0  •  polyethylene glycol (MIRALAX) 17 g packet, Take 17 g by mouth daily for 7 days, Disp: 119 g, Rfl: 0  •  prasugrel (EFFIENT) tablet, 1 tablet daily, Disp: 90 tablet, Rfl: 3  •  senna (SENOKOT) 8.6 mg, Take 2 tablets (17.2 mg total) by mouth daily for 7 days Do not start before November 14, 2023., Disp: 14 tablet, Rfl: 0  •  sertraline (ZOLOFT) 25 mg tablet, TAKE 1 TABLET BY MOUTH EVERY DAY, Disp: 90 tablet, Rfl: 1  Allergies   Allergen Reactions   • Sulfa Antibiotics Anaphylaxis   • Formaldehyde    • Codeine Palpitations       Labs:not applicable  Imaging: No results found.    Review of Systems:  Review of Systems   All other systems reviewed and are negative.      Physical Exam:  /74 (BP Location: Left arm, Patient Position: Sitting, Cuff Size: Standard)   Pulse 81   Wt 55.4 kg (122 lb 1.6 oz)   SpO2 98%   BMI 24.66 kg/m²   Physical Exam  Vitals reviewed.   Constitutional:       Appearance: She is well-developed.   HENT:      Head: Normocephalic and atraumatic.   Eyes:      Conjunctiva/sclera: Conjunctivae normal.      Pupils: Pupils are equal, round, and reactive to light.   Cardiovascular:      Rate and Rhythm: Normal rate.      Heart sounds: Normal heart sounds.   Pulmonary:      Effort: Pulmonary effort is normal.      Breath sounds: Normal breath sounds.   Musculoskeletal:      Cervical back: Normal range of motion and neck supple.   Skin:     General: Skin is warm and  dry.   Neurological:      Mental Status: She is alert and oriented to person, place, and time.         Discussion/Summary:I will continue the patient's present medical regimen.  The patient appears well compensated.  I have asked the patient to call if there is a problem in the interim otherwise I will see the patient in six months time.  Will check blood work in reference to amiodarone surveillance.

## 2024-08-09 ENCOUNTER — TELEPHONE (OUTPATIENT)
Age: 84
End: 2024-08-09

## 2024-08-09 ENCOUNTER — PATIENT OUTREACH (OUTPATIENT)
Dept: CASE MANAGEMENT | Facility: OTHER | Age: 84
End: 2024-08-09

## 2024-08-09 NOTE — TELEPHONE ENCOUNTER
Pt's daughter, Melyssa, called to alert Dr Warner and ask for her advice.  Melyssa is an emergency contact, but is not on pt's Communication Consent.  Melyssa said she has to do something about her mother, who has memory issues and currently is living by herself because her  is in the hospital.  Daughter said pt has been making suicidal threats almost daily.  She tells her she is going to go out on [address] and  front of a truck.  Daughter does not know what to do to get help for patient, or as daughter says to get pt under control.  She has spoken with her father about this and they did speak with a , but they were told they would probably have to go to court.  Daughter said she also thought perhaps she should just take the police with her to have pt removed from the home.  Daughter is very worried and is looking for guidance on how she should proceed with pt.  She is also aware she is not on the communication consent, so that may present some limitations.  Pt has OVS scheduled with Dhara CHAVEZ next week, 8/15/24.  Please advise.

## 2024-08-09 NOTE — TELEPHONE ENCOUNTER
Call made out to [facility] [phone], spoke with Nevaeh.  Advise emergent ER. Social Work team alerted.

## 2024-08-09 NOTE — PROGRESS NOTES
OPCM SW received Urgent IB message, as well as Epic Secure Chat from OPCM RN-Lupe along with patient's PCP regarding suicidal ideation expressed and daughter-Reshma's call for concern.  Per PCP, advice would be to report to the ER.  PCP notified [facility] as well as filed a report with APS.    OPCM SW reached out to patient's daughter regarding the above, reports that have been made along with PCP advisement, however no answer and voicemail box was full at this time.  PCP informed.  OPCADALBERTO COKER added self to Care Team for Surveillance f/u to ensure APS/Crisis follow for patient's care

## 2024-08-09 NOTE — TELEPHONE ENCOUNTER
Spoke with Bhavna in Clerical, who answered for Clinical, to please make Dr Warner aware of message.

## 2024-08-11 NOTE — PROGRESS NOTES
Assessment/Plan:     Problem List Items Addressed This Visit        Genitourinary    Uterovaginal prolapse - Primary  Pt would like trial without pessary  Pessary removed today  If she has bothersome symptoms including urinary retention she will call for appt  Otherwise will f/u in 6 months to discuss  At that time can offer breast exam and mammo if pt would like  Does not need pap smear  Subjective:     Patient ID: Shannan Archibald is a 79 y o  female  78 yo F with h/o pelvic organ prolapse presents for pessary follow up  Denies bleeding or discharge  Continues to have difficulty with bowel movements  No urinary problems  Pessary changed to #3 ring at last visit  Pt questioning whether she can remove the pessary permanently  Review of Systems   All other systems reviewed and are negative  Objective:     Physical Exam  Genitourinary:     Labia:         Right: No rash  Left: No rash  Comments: Pt uncomfortable with pessary removal  On speculum exam, no abrasions or ulcerations  Has grade 1 cystocele and uterine prolapse  Neurological:      Mental Status: She is alert and oriented to person, place, and time     Psychiatric:         Behavior: Behavior normal  calm

## 2024-08-13 ENCOUNTER — PATIENT OUTREACH (OUTPATIENT)
Dept: CASE MANAGEMENT | Facility: OTHER | Age: 84
End: 2024-08-13

## 2024-08-13 NOTE — PROGRESS NOTES
OPCM SW received ADT alert that patient was discharged home from the ED after evaluation on Friday, 8/9.  OPCM SW plan to f/u with family tomorrow 8/14

## 2024-08-15 ENCOUNTER — OFFICE VISIT (OUTPATIENT)
Dept: FAMILY MEDICINE CLINIC | Facility: MEDICAL CENTER | Age: 84
End: 2024-08-15
Payer: MEDICARE

## 2024-08-15 VITALS
TEMPERATURE: 98.3 F | DIASTOLIC BLOOD PRESSURE: 70 MMHG | SYSTOLIC BLOOD PRESSURE: 150 MMHG | HEIGHT: 59 IN | OXYGEN SATURATION: 99 % | RESPIRATION RATE: 18 BRPM | WEIGHT: 123.6 LBS | BODY MASS INDEX: 24.92 KG/M2 | HEART RATE: 79 BPM

## 2024-08-15 DIAGNOSIS — Z09 FOLLOW-UP EXAM: Primary | ICD-10-CM

## 2024-08-15 PROBLEM — S81.801A WOUND OF RIGHT LEG: Status: RESOLVED | Noted: 2022-12-22 | Resolved: 2024-08-15

## 2024-08-15 PROBLEM — R74.8 ABNORMAL LIVER ENZYMES: Status: RESOLVED | Noted: 2021-01-21 | Resolved: 2024-08-15

## 2024-08-15 PROBLEM — I13.0 HYPERTENSIVE HEART AND RENAL DISEASE WITH HEART FAILURE AND RENAL FAILURE (HCC): Status: ACTIVE | Noted: 2024-08-15

## 2024-08-15 PROBLEM — R60.0 EDEMA OF LEFT UPPER EXTREMITY: Status: RESOLVED | Noted: 2021-01-22 | Resolved: 2024-08-15

## 2024-08-15 PROBLEM — I13.0 HYPERTENSIVE HEART AND RENAL DISEASE WITH HEART FAILURE AND RENAL FAILURE (HCC): Chronic | Status: ACTIVE | Noted: 2017-08-22

## 2024-08-15 PROBLEM — T07.XXXA MULTIPLE CONTUSIONS: Status: RESOLVED | Noted: 2023-11-12 | Resolved: 2024-08-15

## 2024-08-15 PROBLEM — I12.9 HYPERTENSIVE CHRONIC KIDNEY DISEASE: Status: ACTIVE | Noted: 2024-08-15

## 2024-08-15 PROBLEM — S62.306A CLOSED NONDISPLACED FRACTURE OF FIFTH METACARPAL BONE OF RIGHT HAND, UNSPECIFIED PORTION OF METACARPAL, INITIAL ENCOUNTER: Status: RESOLVED | Noted: 2023-11-17 | Resolved: 2024-08-15

## 2024-08-15 PROBLEM — S62.306A CLOSED FRACTURE OF FIFTH METACARPAL BONE OF RIGHT HAND: Status: RESOLVED | Noted: 2023-11-13 | Resolved: 2024-08-15

## 2024-08-15 PROBLEM — K55.019 ACUTE (REVERSIBLE) ISCHEMIA OF SMALL INTESTINE, EXTENT UNSPECIFIED (HCC): Status: RESOLVED | Noted: 2022-01-14 | Resolved: 2024-08-15

## 2024-08-15 PROBLEM — I12.9 HYPERTENSIVE CHRONIC KIDNEY DISEASE: Chronic | Status: ACTIVE | Noted: 2017-08-22

## 2024-08-15 PROBLEM — R33.9 URINARY RETENTION: Status: RESOLVED | Noted: 2021-01-22 | Resolved: 2024-08-15

## 2024-08-15 PROBLEM — N18.32 CHRONIC KIDNEY DISEASE, STAGE 3B (HCC): Status: ACTIVE | Noted: 2024-02-27

## 2024-08-15 PROBLEM — I49.5 TACHY-BRADY SYNDROME (HCC): Status: RESOLVED | Noted: 2021-01-21 | Resolved: 2024-08-15

## 2024-08-15 PROBLEM — R56.9 SEIZURE-LIKE ACTIVITY (HCC): Status: RESOLVED | Noted: 2021-01-29 | Resolved: 2024-08-15

## 2024-08-15 PROBLEM — R41.3 MEMORY LOSS: Status: RESOLVED | Noted: 2021-05-03 | Resolved: 2024-08-15
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
## 2024-08-15 PROCEDURE — 99214 OFFICE O/P EST MOD 30 MIN: CPT

## 2024-08-15 NOTE — PROGRESS NOTES
Assessment/Plan:       1. Follow-up exam  Discussed area of aging who have already been out to home.  Discussed potential options for care moving forward. Daughter is aware she needs to work on trying to obtain POA so she can make more decisions for her mother and her safety.   Advised to follow up with Dr. Warner PCP.  Patient does not meet criteria for involuntary admission as she denies SI. She is angry about not being able to see her  but otherwise does not demonstrate threatening behavior at this time.       Subjective:      Patient ID: Verito Fallon is a 83 y.o. female.    83 year old female presents with her daughter Jessica Epps today.  She is under the assumption that today's visit is for her regular follow-up.  However, today this appointment was scheduled due to recent ER evaluation that was advised by Dr. Warner, PCP.  On 8/9 patient's daughter called into the office stating patient was making suicidal threats, having memory issues.  At that time, she was advised to take patient to the emergency department for further evaluation, crisis was called and  was also notified.  She did proceed to the ER at that time however was only there for about 30 to 45 minutes and ended up walking out and refusing evaluation per daughter.  Today, her daughter tells me patient's  is sick, he is currently in [facility] for rehab which is a stressor for her. Her daughter reports she has been depressed. Patient tells me she does not feel depressed or hopeless but does miss her  being home. Patient denies SI. Her daughter tells me they have received calls from her  that the patient was calling for help. Patient reports she did call her  to help arrange rides for her to go visit her .   Patient continuously repeats that she just wants to go visit her .  The confusion that her daughter is referring to is whether or not patient wants to go and visit her  for  "the day or to stay there with him.  She tells me that if she wants to stay she would need a full physical and TB testing.  Patient states that she does not want to stay there she just wants to go and visit him and she does not understand why this cannot happen.  Her daughter tells me that she is forgetful, reports she called her continuously asking for rides to go visit her .  Patient lives at home alone currently as her  is in rehab but prior to this she was living with her  at home.  Patient tells me she is able to get around her home, feels safe in her home, does her own laundry, cooking and cleaning.  She is currently alert and oriented and does not appear to be confused at this time.  Her daughter Jessica castillo is frustrated and does not know what else to do for her because she refuses to go into a memory care facility.  She also tells me the area of aging was recently to her home for assessment.  During visit, patient got up, stormed out of office stating \"all you healthcare people want to do is take our independence away.\" I discussed with daughter possibly having patient go into memory care facility, getting an in home health aid and or having patient live with family. She tells me she refuses all of this and will not listen to her.        The following portions of the patient's history were reviewed and updated as appropriate: current medications, past family history, past medical history, past social history, past surgical history, and problem list.    Review of Systems   Constitutional: Negative.    HENT: Negative.     Eyes: Negative.    Respiratory: Negative.     Cardiovascular: Negative.    Gastrointestinal: Negative.    Endocrine: Negative.    Genitourinary: Negative.    Musculoskeletal: Negative.    Skin: Negative.    Allergic/Immunologic: Negative.    Neurological: Negative.    Hematological: Negative.    Psychiatric/Behavioral:  Positive for agitation. Negative for confusion and " "suicidal ideas.          Objective:      /70 (BP Location: Left arm, Patient Position: Sitting, Cuff Size: Standard)   Pulse 79   Temp 98.3 °F (36.8 °C) (Temporal)   Resp 18   Ht 4' 11\" (1.499 m)   Wt 56.1 kg (123 lb 9.6 oz)   SpO2 99%   BMI 24.96 kg/m²          Physical Exam  Vitals and nursing note reviewed.   Constitutional:       Appearance: Normal appearance.   Cardiovascular:      Rate and Rhythm: Normal rate.   Pulmonary:      Effort: Pulmonary effort is normal.   Skin:     General: Skin is warm and dry.   Neurological:      Mental Status: She is alert and oriented to person, place, and time.   Psychiatric:         Attention and Perception: Attention normal.         Mood and Affect: Affect is angry.         Speech: Speech normal.         Behavior: Behavior is agitated. Behavior is not aggressive or combative.         Thought Content: Thought content does not include homicidal or suicidal ideation. Thought content does not include homicidal or suicidal plan.                    KATHY Doty  "

## 2024-08-16 ENCOUNTER — PATIENT OUTREACH (OUTPATIENT)
Dept: CASE MANAGEMENT | Facility: OTHER | Age: 84
End: 2024-08-16

## 2024-08-16 ENCOUNTER — OFFICE VISIT (OUTPATIENT)
Dept: CARDIOLOGY CLINIC | Facility: CLINIC | Age: 84
End: 2024-08-16
Payer: MEDICARE

## 2024-08-16 ENCOUNTER — IN-CLINIC DEVICE VISIT (OUTPATIENT)
Dept: CARDIOLOGY CLINIC | Facility: CLINIC | Age: 84
End: 2024-08-16
Payer: MEDICARE

## 2024-08-16 VITALS
BODY MASS INDEX: 24.66 KG/M2 | WEIGHT: 122.1 LBS | OXYGEN SATURATION: 98 % | SYSTOLIC BLOOD PRESSURE: 126 MMHG | HEART RATE: 81 BPM | DIASTOLIC BLOOD PRESSURE: 74 MMHG

## 2024-08-16 DIAGNOSIS — I25.10 CORONARY ARTERIOSCLEROSIS: ICD-10-CM

## 2024-08-16 DIAGNOSIS — I10 ESSENTIAL HYPERTENSION: ICD-10-CM

## 2024-08-16 DIAGNOSIS — I50.43 ACUTE ON CHRONIC COMBINED SYSTOLIC AND DIASTOLIC CONGESTIVE HEART FAILURE (HCC): ICD-10-CM

## 2024-08-16 DIAGNOSIS — I25.10 CORONARY ARTERY DISEASE INVOLVING NATIVE HEART WITHOUT ANGINA PECTORIS, UNSPECIFIED VESSEL OR LESION TYPE: Primary | ICD-10-CM

## 2024-08-16 DIAGNOSIS — Z95.0 PRESENCE OF PERMANENT CARDIAC PACEMAKER: ICD-10-CM

## 2024-08-16 DIAGNOSIS — N18.32 CHRONIC KIDNEY DISEASE, STAGE 3B (HCC): ICD-10-CM

## 2024-08-16 DIAGNOSIS — Z95.0 CARDIAC PACEMAKER IN SITU: Primary | ICD-10-CM

## 2024-08-16 DIAGNOSIS — I48.91 ATRIAL FIBRILLATION, UNSPECIFIED TYPE (HCC): ICD-10-CM

## 2024-08-16 DIAGNOSIS — Z95.1 HX OF CABG: ICD-10-CM

## 2024-08-16 DIAGNOSIS — I48.91 A-FIB (HCC): ICD-10-CM

## 2024-08-16 PROCEDURE — 99214 OFFICE O/P EST MOD 30 MIN: CPT | Performed by: INTERNAL MEDICINE

## 2024-08-16 PROCEDURE — 93280 PM DEVICE PROGR EVAL DUAL: CPT | Performed by: STUDENT IN AN ORGANIZED HEALTH CARE EDUCATION/TRAINING PROGRAM

## 2024-08-16 RX ORDER — AMIODARONE HYDROCHLORIDE 200 MG/1
100 TABLET ORAL DAILY
Qty: 45 TABLET | Refills: 3 | Status: SHIPPED | OUTPATIENT
Start: 2024-08-16

## 2024-08-16 RX ORDER — METOPROLOL SUCCINATE 25 MG/1
25 TABLET, EXTENDED RELEASE ORAL DAILY
Qty: 30 TABLET | Refills: 8 | Status: SHIPPED | OUTPATIENT
Start: 2024-08-16

## 2024-08-16 RX ORDER — ATORVASTATIN CALCIUM 20 MG/1
20 TABLET, FILM COATED ORAL
Qty: 90 TABLET | Refills: 4 | Status: SHIPPED | OUTPATIENT
Start: 2024-08-16

## 2024-08-16 NOTE — PROGRESS NOTES
OPCM SW was OOO during previous scheduled outreach to patient's daughter.  OPCM SW reached out today for f/u regarding her mother and resource referrals previously made.  OPCM SW unable to leave a voicemail.  Plan for f/u within 1 week if no return call received prior

## 2024-08-16 NOTE — PROGRESS NOTES
Results for orders placed or performed in visit on 08/16/24   Cardiac EP device report    Narrative    MDT-DUAL CHAMBER PPM (DDDR MODE)/ACTIVE SYSTEM IS MRI CONDITIONAL  DEVICE INTERROGATED IN THE [place] OFFICE. BATTERY VOLTAGE ADEQUATE (6.9 YRS). AP-92%, >99% (>40% DDDR@70PPM/MVP OFF). ALL LEAD PARAMETERS WITHIN NORMAL LIMITS. 1 DEVICE CLASSIFIED NSVT EPISODE- SVT ON EGM. 1 FAST A&V EPISODE @ 140 BPM LASTING 12 SEC- SVT ON EGM. 3 AF EPISODES MAX DURATION 54.3 MINS. AF BURDEN SINCE 4/25/23<0.1%. PT ON XARELTO, AMIO & MEOTPROLOL. PT TO SEE DR. RETANA TODAY. NORMAL DEVICE FUNCTION. GV

## 2024-08-19 ENCOUNTER — TELEPHONE (OUTPATIENT)
Age: 84
End: 2024-08-19

## 2024-08-19 NOTE — TELEPHONE ENCOUNTER
ADVICE FROM doctor only or prescription       Daughter is calling stating she doesn't know what to do anymore for her mom.  She stated her memory is not there.  She gets agitated and forgetful.  Tried to taking her to ER and she walked out.  She visits her  and then thinks he's dead.  Jessica Epps daughter has spoke with Dr. Jiang, Depart of Aging, Crisis. She does not know what to do anymore and worried about her mom who lives alone.  She really wants something prescribed to help her relax and mellow out per daughter.  She is looking for any kind of guidance or advice.    Mom has left notes in house stating she is going to comiitt suicide.  Family is very worried about her.  Please call Jessica Epps back.  Thank you

## 2024-08-20 NOTE — TELEPHONE ENCOUNTER
Please schedule appointment to discuss medication and will also appreciate continued support from social work on this case.

## 2024-08-22 ENCOUNTER — PATIENT OUTREACH (OUTPATIENT)
Dept: CASE MANAGEMENT | Facility: OTHER | Age: 84
End: 2024-08-22

## 2024-08-22 ENCOUNTER — TELEPHONE (OUTPATIENT)
Age: 84
End: 2024-08-22

## 2024-08-22 NOTE — TELEPHONE ENCOUNTER
"Spoke to Pratima. She inquired to what we do in this office d/t Pt's daughter having concerns and frustrations. MA explained the process of an appointment: Rooming and Vitals, Split Pt and Care Provider, MoCA with Pt/Provider talks to Care Provider, the Provider then sees Pt. Social Workers are in office to help aid family and patient if necessary. Pratima mentioned questioned ability to make decisions and MA noted that Sr. Care can help with that determination, any provider in office is capable in conjunction with working with Social Work to get any documentation needed completed.   Pratima also brought up concern about Pt wandering and trying to leave office. MA remarked office does not have a straight, unobstructed route to the exit door but rather is a \"maze like\" that associates in the office will be able to recognize and react to a Pt's wandering/leaving. MA suggested Appt in PM so SW will be able to be with Pt more so. Also noted MA's do sit with Pt's and talk to them or offer activity books to help retain attention.   Pt's dgt told [facility] stated they could not do anything for Pt since Pt does not have formal diagnosis of Dementia though Dgt states her condition has declined and even spoke of 'ending her life'.   MA noted Sr. Care helps the Pt but the Care Partner as well and the Provider's are very good at what they do, as well as the Social Workers.  Pratima was thankful for the information and call back, noted she will call the daughter and convey information obtained.  "

## 2024-08-22 NOTE — PROGRESS NOTES
MINOOADALBERTO JOHN PAUL received message from PCP office requesting continued SW support.  LINN COKER has had 2 unsuccessful outreaches at this time.  LINN COKER reached out to Flo for f/u.  She reports that Crisis and Aging both stated that they were unable to help patient as she answers the mini cog questions successfully, however she is increasingly getting confused and not managing well on her own at home.  Both agencies had suggested a capacity evaluation, however daughter struggles with getting patient to appointments and staying for these appointments.  Daughter works full time and is started to feel there is nothing she can do to help her mother at this point due to not having a capacity evaluation.    LINN COKER discussed that PCP suggested an appointment to discuss medications, however daughter is concerned that she would have to leave work again for her mother to leave the appointment.  LINN COKER discussed SW reaching out to [facility] to discuss patient's current situation and to inquire as to how this could be handled/approached and plan to return call to daughter.    LINN COKER reached out to [facility] and left a message.  Plan for f/u tomorrow if no return call received today.    Addendum:    MINOOADALBERTO JOHN PAUL received call from [facility] whom provided information regarding what their appointments look like and confirmed that they have dealt with several other patients with behaviors such as Verito has been exhibiting as well.  He did suggest an afternoon appointment so that SW is available to assist if needed if family decides to pursue and appointment.    LINN COKER reached out to Don and provided her with the above information.  At this time, she is still hesitant to attempt to follow through with an appointment for her mom.  She would like her mother to be given some medication, however declines bringing her to an in person appointment due to fear of elopement.  LINN COKER again encouraged her to f/u with  [facility] as they are equipped to manage and redirect these behaviors.    OPCM SW also routed note to PCP for continuity of care

## 2024-08-22 NOTE — TELEPHONE ENCOUNTER
Pratima matthew [facility] called in regarding patient she would like to speak to someone clinical to go over patients history before referring patient and family to senior care to make sure she is appropriate. Please advise.

## 2024-08-23 ENCOUNTER — TELEPHONE (OUTPATIENT)
Age: 84
End: 2024-08-23

## 2024-08-23 DIAGNOSIS — F69 BEHAVIOR CONCERN IN ADULT: ICD-10-CM

## 2024-08-23 DIAGNOSIS — R41.89 COGNITIVE IMPAIRMENT: Primary | ICD-10-CM

## 2024-08-23 NOTE — TELEPHONE ENCOUNTER
Contacted patient in regards to ASAP Referral  in attempts to verify patient's needs of services and scheduling patient for proper services. Unable to  LVM for patient to contact intake dept  in regards to referral due to vm being full.

## 2024-08-26 ENCOUNTER — TELEPHONE (OUTPATIENT)
Age: 84
End: 2024-08-26

## 2024-08-26 NOTE — TELEPHONE ENCOUNTER
Contacted patient in regards to ASAP Referral  in attempts to verify patient's needs of services and scheduling patient for proper services. Unable to  LVM for patient to contact intake dept  in regards to referral due to vm being full. 2nd attempt.

## 2024-08-28 NOTE — TELEPHONE ENCOUNTER
"Nevaeh from [facility] calling in regards to form that was sent back to the agency on patient.  Form is scanned into media on 8/23/24  There is a section, Number 4 on the form, that Dr. Warner filled out that states \" patient lacks capacity\".  Dr Warner marked \"No\", but there is no testing that they use to determine this.  Agency is requesting a written letter from Alana Hernandez proving that this answer.  Please fax letter to [phone]    If there are any questions please call Nevaeh at   [phone]    "
All information faxed. Confirmation recieved  
Faith at [facility] in [facility]# [phone] stated other information was sent. However, Faith needs list of medications for pt. Please fax to [phone]. Thank you for you help.   
Forms left in scan bin to fax.
Left message for Faith to fax over a medical release form to [phone] asap so we can fax list of medications to her.   
Medication list has been printed and faxed accordingly  
Yes

## 2024-08-30 ENCOUNTER — E-CONSULT (OUTPATIENT)
Dept: PSYCHIATRY | Facility: CLINIC | Age: 84
End: 2024-08-30

## 2024-08-30 NOTE — TELEPHONE ENCOUNTER
Pt called stating she needs a physical appt so she can go see her  who is dying at [facility]. I confirmed the pt was feeling ok as she is very upset and told her to call us if anything changed to call us. I also advised we would call her next week to see if we need to set up an appt. She is also upset with her daughter who she states will not take her to see her . I asked her if there was anyone else she can talk to. She said she will call her son when she hangs up with us.     I called the pts daughter and she said her father is very sick. She has paper work to be completed for the pt to be admitted to [facility]. Her mother recently expressed that she wants to go there. She will fax it to us next week from work.

## 2024-08-30 NOTE — TELEPHONE ENCOUNTER
Patient calling extremely upset her  is dying and she cannot go be with him until she has a phys.   Radha chapo and her daughter told her this.  Office is closed, pt was very distraught, pulled in CTS for 3 way escalation.

## 2024-08-30 NOTE — PROGRESS NOTES
"E-Consult  Verito Fallon 83 y.o. female MRN: [id number]  Encounter Date: 08/30/24      Reason for Consult / Principal Problem: Cognitive impairment, behavioral disturbances lately     Consulting Provider: Sherly Rooney MD    Requesting Provider: Robbie Warner DO       ASSESSMENT:  83 year old female with PMH of unspecified depression and anxiety, currently on sertraline 25 mg po daily. Consulted to evaluate concerns about cognitive decline. According to chart:    \"Daughter is calling stating she doesn't know what to do anymore for her mom.  She stated her memory is not there.  She gets agitated and forgetful.  Tried to taking her to ER and she walked out.  She visits her  and then thinks he's dead.  Jessica Epps daughter has spoke with Dr. Jiang, Depart of Aging, Crisis. She does not know what to do anymore and worried about her mom who lives alone.  She really wants something prescribed to help her relax and mellow out per daughter.  She is looking for any kind of guidance or advice.     Mom has left notes in house stating she is going to comiitt suicide.  Family is very worried about her. \"    RECOMMENDATIONS:  Continue Sertraline and gradually titrate dose as tolerated to manage mood and anxiety. Consider adding Quetiapine 12.5 mg po qhs to help with agitation. Patient needs comprehensive psychiatric and neurological evaluation. Per chart psychiatry intake dept has attempted twice to contact patient for scheduling (8/23 and 8/26).  If safety is a concern patient needs to be evaluated at ED for psychiatric admission,    Total time spent >5 min, >50% time spent reviewing/analyzing record, written report will be generated in the EMR. .  "

## 2024-09-03 NOTE — TELEPHONE ENCOUNTER
Contacted patient in regards to ASAP Referral  in attempts to verify patient's needs of services and schedule an appointment for preferred services. Spoke w. Patient daughter whom stated patient was not available at this time. Writer ángela for patient to contact intake dept in regards to referral.

## 2024-09-04 ENCOUNTER — TELEPHONE (OUTPATIENT)
Age: 84
End: 2024-09-04

## 2024-09-04 ENCOUNTER — TELEPHONE (OUTPATIENT)
Dept: FAMILY MEDICINE CLINIC | Facility: MEDICAL CENTER | Age: 84
End: 2024-09-04

## 2024-09-04 NOTE — TELEPHONE ENCOUNTER
Patients daughter called in stating patient would like to reside at [facility] where her  is living. Forms need to be filled out for her by PCP and also needs a TB test. Provided daughter fax number to fax [facility] forms over. Patient has upcoming appt on 9/27 for her AWV. Please advise if Dr. Warner can fill out paperwork before her AWV. Thank you.

## 2024-09-05 DIAGNOSIS — Z01.89 NEED FOR PHYSICAL THERAPY ASSESSMENT: ICD-10-CM

## 2024-09-05 DIAGNOSIS — Z76.89 NEED FOR OCCUPATIONAL THERAPY ASSESSMENT: Primary | ICD-10-CM

## 2024-09-11 ENCOUNTER — PATIENT OUTREACH (OUTPATIENT)
Dept: CASE MANAGEMENT | Facility: OTHER | Age: 84
End: 2024-09-11

## 2024-09-11 NOTE — PROGRESS NOTES
OPCM SW reached out to patient's daughter-Jessica for f/u call.  Per chart review, her mother is trying to get into [facility] where her  resides.  Office had completed paperwork requested for daughter-Jessica.  OPCM SW reached out to daughter for any additional SW support needed.  Unable to leave a voicemail as it is full.  Plan for f/u next week if no return call prior

## 2024-09-18 ENCOUNTER — PATIENT OUTREACH (OUTPATIENT)
Dept: CASE MANAGEMENT | Facility: OTHER | Age: 84
End: 2024-09-18

## 2024-09-18 NOTE — PROGRESS NOTES
OPCM SW reached out to daughter for f/u.  She has an appointment with Dr. Jiang next Friday.  They are still looking to possibly get patient into [facility] at some point next week.  No current SW involvement needed at this time.  OPCM SW encouraged her to f/u as needed if assistance required to help patient transition into the facility.  Referral will be closed.

## 2024-09-23 ENCOUNTER — RA CDI HCC (OUTPATIENT)
Dept: OTHER | Facility: HOSPITAL | Age: 84
End: 2024-09-23

## 2024-09-23 NOTE — PROGRESS NOTES
HCC coding opportunities          Chart Reviewed number of suggestions sent to Provider: 2     Patients Insurance   I70.219 and I13.0  Medicare Insurance: Medicare

## 2024-09-27 ENCOUNTER — APPOINTMENT (OUTPATIENT)
Dept: LAB | Facility: MEDICAL CENTER | Age: 84
End: 2024-09-27
Payer: MEDICARE

## 2024-09-27 ENCOUNTER — OFFICE VISIT (OUTPATIENT)
Dept: FAMILY MEDICINE CLINIC | Facility: MEDICAL CENTER | Age: 84
End: 2024-09-27
Payer: MEDICARE

## 2024-09-27 VITALS
WEIGHT: 116.8 LBS | RESPIRATION RATE: 18 BRPM | HEIGHT: 59 IN | HEART RATE: 89 BPM | TEMPERATURE: 97 F | DIASTOLIC BLOOD PRESSURE: 80 MMHG | SYSTOLIC BLOOD PRESSURE: 140 MMHG | BODY MASS INDEX: 23.55 KG/M2 | OXYGEN SATURATION: 99 %

## 2024-09-27 DIAGNOSIS — Z95.0 PRESENCE OF PERMANENT CARDIAC PACEMAKER: ICD-10-CM

## 2024-09-27 DIAGNOSIS — I48.91 A-FIB (HCC): ICD-10-CM

## 2024-09-27 DIAGNOSIS — Z95.1 HX OF CABG: ICD-10-CM

## 2024-09-27 DIAGNOSIS — L98.9 SKIN LESION: ICD-10-CM

## 2024-09-27 DIAGNOSIS — Z00.00 MEDICARE ANNUAL WELLNESS VISIT, SUBSEQUENT: Primary | ICD-10-CM

## 2024-09-27 DIAGNOSIS — I48.91 ATRIAL FIBRILLATION, UNSPECIFIED TYPE (HCC): ICD-10-CM

## 2024-09-27 DIAGNOSIS — I25.10 CORONARY ARTERY DISEASE INVOLVING NATIVE HEART WITHOUT ANGINA PECTORIS, UNSPECIFIED VESSEL OR LESION TYPE: ICD-10-CM

## 2024-09-27 DIAGNOSIS — I25.10 CORONARY ARTERIOSCLEROSIS: ICD-10-CM

## 2024-09-27 DIAGNOSIS — I10 ESSENTIAL HYPERTENSION: ICD-10-CM

## 2024-09-27 LAB
ALBUMIN SERPL BCG-MCNC: 4.4 G/DL (ref 3.5–5)
ALP SERPL-CCNC: 71 U/L (ref 34–104)
ALT SERPL W P-5'-P-CCNC: 14 U/L (ref 7–52)
AST SERPL W P-5'-P-CCNC: 26 U/L (ref 13–39)
BILIRUB DIRECT SERPL-MCNC: 0.15 MG/DL (ref 0–0.2)
BILIRUB SERPL-MCNC: 1.2 MG/DL (ref 0.2–1)
PROT SERPL-MCNC: 7.6 G/DL (ref 6.4–8.4)
TSH SERPL DL<=0.05 MIU/L-ACNC: 1.53 UIU/ML (ref 0.45–4.5)

## 2024-09-27 PROCEDURE — 84443 ASSAY THYROID STIM HORMONE: CPT

## 2024-09-27 PROCEDURE — 80076 HEPATIC FUNCTION PANEL: CPT

## 2024-09-27 PROCEDURE — G0439 PPPS, SUBSEQ VISIT: HCPCS | Performed by: STUDENT IN AN ORGANIZED HEALTH CARE EDUCATION/TRAINING PROGRAM

## 2024-09-27 PROCEDURE — 36415 COLL VENOUS BLD VENIPUNCTURE: CPT

## 2024-09-27 RX ORDER — TRIAMCINOLONE ACETONIDE 1 MG/G
CREAM TOPICAL 2 TIMES DAILY
Qty: 15 G | Refills: 0 | Status: SHIPPED | OUTPATIENT
Start: 2024-09-27 | End: 2024-10-04

## 2024-09-27 RX ORDER — QUETIAPINE FUMARATE 25 MG/1
12.5 TABLET, FILM COATED ORAL
Qty: 15 TABLET | Refills: 0 | Status: CANCELLED | OUTPATIENT
Start: 2024-09-27 | End: 2024-10-27

## 2024-09-27 NOTE — PROGRESS NOTES
Ambulatory Visit  Name: Verito Fallon      : 1940      MRN: [id number]  Encounter Provider: Robbie Warner DO  Encounter Date: 2024   Encounter department: [facility]    Assessment & Plan  Medicare annual wellness visit, subsequent  Declines all recommended vaccines today  Results and recommendations from recent E consultation psychiatry reviewed with patient and daughter       Skin lesion    Orders:    Ambulatory Referral to Dermatology; Future    triamcinolone (KENALOG) 0.1 % cream; Apply topically 2 (two) times a day for 7 days      Depression Screening and Follow-up Plan: Patient was screened for depression during today's encounter. They screened negative with a PHQ-9 score of 0.      Follow-up in 6 months and sooner as needed.    Preventive health issues were discussed with patient, and age appropriate screening tests were ordered as noted in patient's After Visit Summary. Personalized health advice and appropriate referrals for health education or preventive services given if needed, as noted in patient's After Visit Summary.    History of Present Illness     HPI     Patient presents today with her daughter.  Patient Care Team:  Robbie Warner DO as PCP - General (Family Medicine)  MD Umang Zapata MD Melinda McLane, CRNP Timothy Clyde Oskin, MD Marilyn R McDonald, MD Robert Samuel Gayner, MD James Gerald Gallagher, MD Chatargy Kaza, MD Chatargy Kaza, MD as Endoscopist  Robbie Warner DO (Family Medicine)    Review of Systems    As noted in HPI   Medical History Reviewed by provider this encounter:  Tobacco  Allergies  Meds  Problems  Med Hx  Surg Hx  Fam Hx       Annual Wellness Visit Questionnaire   Verito is here for her Subsequent Wellness visit.     Health Risk Assessment:   Patient rates overall health as good. Patient feels that their physical health rating is same. Patient is satisfied with their life. Eyesight was rated as same.  Hearing was rated as same. Patient feels that their emotional and mental health rating is same. Patients states they are sometimes angry. Patient states they are sometimes unusually tired/fatigued. Pain experienced in the last 7 days has been none. Patient states that she has experienced no weight loss or gain in last 6 months.     Depression Screening:   PHQ-9 Score: 0      Fall Risk Screening:   In the past year, patient has experienced: no history of falling in past year      Urinary Incontinence Screening:   Patient has not leaked urine accidently in the last six months.     Home Safety:  Patient does not have trouble with stairs inside or outside of their home. Patient has working smoke alarms and has working carbon monoxide detector. Home safety hazards include: none.     Nutrition:   Current diet is Regular and No Added Salt.     Medications:   Patient is not currently taking any over-the-counter supplements. Patient is able to manage medications.     Activities of Daily Living (ADLs)/Instrumental Activities of Daily Living (IADLs):   Walk and transfer into and out of bed and chair?: Yes  Dress and groom yourself?: Yes    Bathe or shower yourself?: Yes    Feed yourself? Yes  Do your laundry/housekeeping?: Yes  Manage your money, pay your bills and track your expenses?: Yes  Make your own meals?: Yes    Do your own shopping?: No    Previous Hospitalizations:   Any hospitalizations or ED visits within the last 12 months?: Yes    How many hospitalizations have you had in the last year?: 1-2    Advance Care Planning:   Living will: Yes    Durable POA for healthcare: Yes    Advanced directive: Yes      Cognitive Screening:   Provider or family/friend/caregiver concerned regarding cognition?: Yes    Cognition Comments: Declines Neurology follow up     PREVENTIVE SCREENINGS      Cardiovascular Screening:    General: History Lipid Disorder      Diabetes Screening:     General: Screening Current      Colorectal Cancer  Screening:     General: Risks and Benefits Discussed and Patient Declines      Breast Cancer Screening:     General: Risks and Benefits Discussed and Patient Declines      Cervical Cancer Screening:    General: Screening Not Indicated      Osteoporosis Screening:    General: History Osteoporosis, Patient Declines and Risks and Benefits Discussed      Abdominal Aortic Aneurysm (AAA) Screening:    Risk factors include: family history of AAA        General: Screening Not Indicated      Lung Cancer Screening:     General: Screening Not Indicated      Hepatitis C Screening:    General: Screening Not Indicated    Screening, Brief Intervention, and Referral to Treatment (SBIRT)    Screening    Typical number of drinks in a week: 2    Single Item Drug Screening:  How often have you used an illegal drug (including marijuana) or a prescription medication for non-medical reasons in the past year? never    Single Item Drug Screen Score: 0  Interpretation: Negative screen for possible drug use disorder    Other Counseling Topics:   Car/seat belt/driving safety.     Social Determinants of Health     Financial Resource Strain: Low Risk  (7/18/2023)    Overall Financial Resource Strain (CARDIA)     Difficulty of Paying Living Expenses: Not hard at all   Food Insecurity: No Food Insecurity (9/27/2024)    Hunger Vital Sign     Worried About Running Out of Food in the Last Year: Never true     Ran Out of Food in the Last Year: Never true   Transportation Needs: No Transportation Needs (9/27/2024)    PRAPARE - Transportation     Lack of Transportation (Medical): No     Lack of Transportation (Non-Medical): No   Housing Stability: Low Risk  (9/27/2024)    Housing Stability Vital Sign     Unable to Pay for Housing in the Last Year: No     Number of Times Moved in the Last Year: 1     Homeless in the Last Year: No   Utilities: Not At Risk (9/27/2024)    [facility] Utilities     Threatened with loss of utilities: No     No results  "found.    Objective     /80 (BP Location: Left arm, Patient Position: Sitting, Cuff Size: Standard)   Pulse 89   Temp (!) 97 °F (36.1 °C) (Temporal)   Resp 18   Ht 4' 11\" (1.499 m)   Wt 53 kg (116 lb 12.8 oz)   SpO2 99%   BMI 23.59 kg/m²     Physical Exam  Vitals reviewed.   Constitutional:       Appearance: Normal appearance.   HENT:      Head: Normocephalic and atraumatic.   Eyes:      Conjunctiva/sclera: Conjunctivae normal.   Cardiovascular:      Rate and Rhythm: Normal rate and regular rhythm.      Pulses: Normal pulses.      Heart sounds: Normal heart sounds.   Pulmonary:      Effort: Pulmonary effort is normal.      Breath sounds: Normal breath sounds.   Abdominal:      General: Abdomen is flat. Bowel sounds are normal.      Palpations: Abdomen is soft.      Tenderness: There is no abdominal tenderness.   Musculoskeletal:      Right lower leg: Right lower leg edema: scant.      Left lower leg: Left lower leg edema: scant.   Skin:     General: Skin is warm and dry.      Capillary Refill: Capillary refill takes less than 2 seconds.      Comments: Pink raised lesion right upper extremity   Neurological:      Mental Status: She is alert. Mental status is at baseline.   Psychiatric:         Mood and Affect: Mood normal.         Behavior: Behavior normal.         Thought Content: Thought content normal.         "

## 2024-09-27 NOTE — ASSESSMENT & PLAN NOTE
Declines all recommended vaccines today  Results and recommendations from recent E consultation psychiatry reviewed with patient and daughter

## 2024-10-27 PROBLEM — Z00.00 MEDICARE ANNUAL WELLNESS VISIT, SUBSEQUENT: Status: RESOLVED | Noted: 2023-07-18 | Resolved: 2024-10-27

## 2024-12-14 DIAGNOSIS — I25.10 CORONARY ARTERIOSCLEROSIS: ICD-10-CM

## 2024-12-16 RX ORDER — PRASUGREL 10 MG/1
TABLET, FILM COATED ORAL
Qty: 30 TABLET | Refills: 3 | Status: SHIPPED | OUTPATIENT
Start: 2024-12-16

## 2024-12-18 NOTE — ASSESSMENT & PLAN NOTE
START ON PATHWAY REGIMEN - Colorectal    [id number]        Oxaliplatin       Leucovorin       Fluorouracil       Fluorouracil     **Always confirm dose/schedule in your pharmacy ordering system**    Patient Characteristics:  Distant Metastases, Resectable, Neoadjuvant Therapy Planned  Tumor Location: Colon  Therapeutic Status: Distant Metastases    Intent of Therapy:  Curative Intent, Discussed with Patient   Wt Readings from Last 3 Encounters:   01/20/21 64 kg (141 lb)   01/16/21 65 3 kg (143 lb 15 4 oz)   01/05/21 63 1 kg (139 lb 3 2 oz)     Patient denies shortness of breath, dyspnea on exertion, swelling, or orthopnea; echo with EF of 45% and diffuse hypokinesis  · Daily weights, Intake/Output  · At [facility] Delisa was on IV lasix, then transitioned to PO lasix 40 mg daily as of 1/17  · Appears euvolemic

## 2025-01-17 DIAGNOSIS — I50.40 COMBINED CONGESTIVE SYSTOLIC AND DIASTOLIC HEART FAILURE (HCC): ICD-10-CM

## 2025-01-17 RX ORDER — BUMETANIDE 2 MG/1
2 TABLET ORAL DAILY
Qty: 90 TABLET | Refills: 1 | Status: SHIPPED | OUTPATIENT
Start: 2025-01-17

## 2025-02-04 ENCOUNTER — HOSPITAL ENCOUNTER (INPATIENT)
Facility: HOSPITAL | Age: 85
LOS: 16 days | Discharge: NURSING, REHAB OR LONG-TERM CARE | DRG: 689 | End: 2025-02-21
Attending: EMERGENCY MEDICINE | Admitting: FAMILY MEDICINE
Payer: MEDICARE

## 2025-02-04 DIAGNOSIS — I48.0 PAROXYSMAL ATRIAL FIBRILLATION (HCC): ICD-10-CM

## 2025-02-04 DIAGNOSIS — R41.3 MEMORY LOSS: Primary | ICD-10-CM

## 2025-02-04 DIAGNOSIS — I50.9 CHF (CONGESTIVE HEART FAILURE) (HCC): ICD-10-CM

## 2025-02-04 DIAGNOSIS — R41.89 COGNITIVE IMPAIRMENT: ICD-10-CM

## 2025-02-04 DIAGNOSIS — G93.41 METABOLIC ENCEPHALOPATHY: ICD-10-CM

## 2025-02-04 DIAGNOSIS — Z00.8 ENCOUNTER FOR PSYCHOLOGICAL EVALUATION: ICD-10-CM

## 2025-02-04 LAB
ANION GAP SERPL CALCULATED.3IONS-SCNC: 8 MMOL/L (ref 4–13)
BACTERIA UR QL AUTO: ABNORMAL /HPF
BASOPHILS # BLD AUTO: 0.05 THOUSANDS/ΜL (ref 0–0.1)
BASOPHILS NFR BLD AUTO: 1 % (ref 0–1)
BILIRUB UR QL STRIP: NEGATIVE
BUN SERPL-MCNC: 16 MG/DL (ref 5–25)
CALCIUM SERPL-MCNC: 9.6 MG/DL (ref 8.4–10.2)
CHLORIDE SERPL-SCNC: 100 MMOL/L (ref 96–108)
CLARITY UR: ABNORMAL
CO2 SERPL-SCNC: 31 MMOL/L (ref 21–32)
COLOR UR: YELLOW
CREAT SERPL-MCNC: 0.96 MG/DL (ref 0.6–1.3)
EOSINOPHIL # BLD AUTO: 0.29 THOUSAND/ΜL (ref 0–0.61)
EOSINOPHIL NFR BLD AUTO: 4 % (ref 0–6)
ERYTHROCYTE [DISTWIDTH] IN BLOOD BY AUTOMATED COUNT: 13.9 % (ref 11.6–15.1)
GFR SERPL CREATININE-BSD FRML MDRD: 54 ML/MIN/1.73SQ M
GLUCOSE SERPL-MCNC: 72 MG/DL (ref 65–140)
GLUCOSE SERPL-MCNC: 85 MG/DL (ref 65–140)
GLUCOSE UR STRIP-MCNC: NEGATIVE MG/DL
HCT VFR BLD AUTO: 41.4 % (ref 34.8–46.1)
HGB BLD-MCNC: 13.8 G/DL (ref 11.5–15.4)
HGB UR QL STRIP.AUTO: ABNORMAL
IMM GRANULOCYTES # BLD AUTO: 0.03 THOUSAND/UL (ref 0–0.2)
IMM GRANULOCYTES NFR BLD AUTO: 0 % (ref 0–2)
KETONES UR STRIP-MCNC: NEGATIVE MG/DL
LEUKOCYTE ESTERASE UR QL STRIP: ABNORMAL
LYMPHOCYTES # BLD AUTO: 1.53 THOUSANDS/ΜL (ref 0.6–4.47)
LYMPHOCYTES NFR BLD AUTO: 21 % (ref 14–44)
MCH RBC QN AUTO: 30.4 PG (ref 26.8–34.3)
MCHC RBC AUTO-ENTMCNC: 33.3 G/DL (ref 31.4–37.4)
MCV RBC AUTO: 91 FL (ref 82–98)
MONOCYTES # BLD AUTO: 0.62 THOUSAND/ΜL (ref 0.17–1.22)
MONOCYTES NFR BLD AUTO: 8 % (ref 4–12)
NEUTROPHILS # BLD AUTO: 4.86 THOUSANDS/ΜL (ref 1.85–7.62)
NEUTS SEG NFR BLD AUTO: 66 % (ref 43–75)
NITRITE UR QL STRIP: POSITIVE
NON-SQ EPI CELLS URNS QL MICRO: ABNORMAL /HPF
NRBC BLD AUTO-RTO: 0 /100 WBCS
PH UR STRIP.AUTO: 7 [PH]
PLATELET # BLD AUTO: 203 THOUSANDS/UL (ref 149–390)
PMV BLD AUTO: 10.8 FL (ref 8.9–12.7)
POTASSIUM SERPL-SCNC: 3.8 MMOL/L (ref 3.5–5.3)
PROT UR STRIP-MCNC: ABNORMAL MG/DL
RBC # BLD AUTO: 4.54 MILLION/UL (ref 3.81–5.12)
RBC #/AREA URNS AUTO: ABNORMAL /HPF
SODIUM SERPL-SCNC: 139 MMOL/L (ref 135–147)
SP GR UR STRIP.AUTO: 1.01 (ref 1–1.03)
UROBILINOGEN UR STRIP-ACNC: <2 MG/DL
WBC # BLD AUTO: 7.38 THOUSAND/UL (ref 4.31–10.16)
WBC #/AREA URNS AUTO: ABNORMAL /HPF

## 2025-02-04 PROCEDURE — 85025 COMPLETE CBC W/AUTO DIFF WBC: CPT | Performed by: EMERGENCY MEDICINE

## 2025-02-04 PROCEDURE — 82948 REAGENT STRIP/BLOOD GLUCOSE: CPT

## 2025-02-04 PROCEDURE — 87077 CULTURE AEROBIC IDENTIFY: CPT | Performed by: EMERGENCY MEDICINE

## 2025-02-04 PROCEDURE — 87086 URINE CULTURE/COLONY COUNT: CPT | Performed by: EMERGENCY MEDICINE

## 2025-02-04 PROCEDURE — 99285 EMERGENCY DEPT VISIT HI MDM: CPT

## 2025-02-04 PROCEDURE — 36415 COLL VENOUS BLD VENIPUNCTURE: CPT | Performed by: EMERGENCY MEDICINE

## 2025-02-04 PROCEDURE — 99222 1ST HOSP IP/OBS MODERATE 55: CPT | Performed by: PHYSICIAN ASSISTANT

## 2025-02-04 PROCEDURE — 99285 EMERGENCY DEPT VISIT HI MDM: CPT | Performed by: EMERGENCY MEDICINE

## 2025-02-04 PROCEDURE — 80048 BASIC METABOLIC PNL TOTAL CA: CPT | Performed by: EMERGENCY MEDICINE

## 2025-02-04 PROCEDURE — 87186 SC STD MICRODIL/AGAR DIL: CPT | Performed by: EMERGENCY MEDICINE

## 2025-02-04 PROCEDURE — 81001 URINALYSIS AUTO W/SCOPE: CPT | Performed by: EMERGENCY MEDICINE

## 2025-02-04 RX ORDER — METOPROLOL SUCCINATE 25 MG/1
25 TABLET, EXTENDED RELEASE ORAL DAILY
Status: DISCONTINUED | OUTPATIENT
Start: 2025-02-05 | End: 2025-02-19

## 2025-02-04 RX ORDER — PRASUGREL 10 MG/1
5 TABLET, FILM COATED ORAL DAILY
Status: DISCONTINUED | OUTPATIENT
Start: 2025-02-05 | End: 2025-02-21 | Stop reason: HOSPADM

## 2025-02-04 RX ORDER — BUMETANIDE 1 MG/1
2 TABLET ORAL DAILY
Status: DISCONTINUED | OUTPATIENT
Start: 2025-02-05 | End: 2025-02-19

## 2025-02-04 RX ORDER — AMIODARONE HYDROCHLORIDE 200 MG/1
100 TABLET ORAL DAILY
Status: DISCONTINUED | OUTPATIENT
Start: 2025-02-05 | End: 2025-02-21 | Stop reason: HOSPADM

## 2025-02-04 RX ORDER — ACETAMINOPHEN 325 MG/1
650 TABLET ORAL EVERY 6 HOURS PRN
Status: DISCONTINUED | OUTPATIENT
Start: 2025-02-04 | End: 2025-02-21 | Stop reason: HOSPADM

## 2025-02-04 RX ORDER — PRASUGREL 10 MG/1
10 TABLET, FILM COATED ORAL DAILY
Status: DISCONTINUED | OUTPATIENT
Start: 2025-02-05 | End: 2025-02-04 | Stop reason: DRUGHIGH

## 2025-02-04 RX ORDER — ATORVASTATIN CALCIUM 20 MG/1
20 TABLET, FILM COATED ORAL
Status: DISCONTINUED | OUTPATIENT
Start: 2025-02-05 | End: 2025-02-21 | Stop reason: HOSPADM

## 2025-02-04 RX ORDER — SERTRALINE HYDROCHLORIDE 25 MG/1
25 TABLET, FILM COATED ORAL DAILY
Status: DISCONTINUED | OUTPATIENT
Start: 2025-02-05 | End: 2025-02-06

## 2025-02-04 RX ADMIN — CEFTRIAXONE SODIUM 1000 MG: 10 INJECTION, POWDER, FOR SOLUTION INTRAVENOUS at 22:39

## 2025-02-04 NOTE — ED PROVIDER NOTES
Time reflects when diagnosis was documented in both MDM as applicable and the Disposition within this note       Time User Action Codes Description Comment    2/4/2025  6:23 PM Dominguez Hutton [R41.3] Memory loss     2/4/2025  6:24 PM Dominguez Hutton [Z00.8] Encounter for psychological evaluation     2/4/2025 10:03 PM Erika Child [G93.41] Metabolic encephalopathy     2/4/2025 10:03 PM Erika Child [R41.89] Cognitive impairment           ED Disposition       ED Disposition   Admit    Condition   Stable    Date/Time   Tue Feb 4, 2025  6:23 PM    Comment   Case was discussed with EMAIM and the patient's admission status was agreed to be Admission Status: observation status to the service of Dr. Robison .               Assessment & Plan       Medical Decision Making  84-year-old female presenting to the ED for SI.    Patient came by EMS and is in room talking with crisis.  302 was initially considered however when I went to see the patient, patient was obviously confused with no capacity.  Patient was asking questions like why she in the hospital or how did she get here.  When discussing the patient about what had happened earlier with her , patient states she does not remember being with her  today does not remember making the statements.  Patient starts stating that her  is sick at home and she needs to go and take care of him when in fact her  is in a nursing home being taken care of.  Discussed with the patient if she remembered speaking with the crisis worker and patient states she does not remember speaking with anyone.  After I did my physical exam, I left the room to do other work and return in 5 minutes upon returning, the patient had already forgotten that she had met me.    I do not feel this patient has a psychiatric concern rather a cognitive concern.  I spoke with the patient's daughter who states that she has been attempting to get the  patient evaluated for cognitive decline outpatient however the patient has been noncompliant and staying with the evaluations or staying with this daughter.  Daughter has attempted placing the patient in the nursing home however patient refuses to stay and leaves.    I do not feel comfortable sending the patient home as she does not have capacity.  Patient is consistently forgetting what is happening throughout the day.  However I also do not feel this is a psychiatric emergency.  I will admit patient for safety hold and have her seen by geriatrics and social work to discuss placement in a nursing home in memory care.    Patient admitted to [facility].  302 not upheld.    Basic lab work have been ordered and CBC and CMP were unremarkable.  UA had posted following patient's admission and does appear to have a UTI.  Treatment per [facility] team.  Patient admitted.    Amount and/or Complexity of Data Reviewed  Labs: ordered. Decision-making details documented in ED Course.    Risk  Decision regarding hospitalization.        ED Course as of 02/05/25 0000   Tue Feb 04, 2025 1756 CBC and differential  Unremarkable and reassuring.   1756 POC Glucose: 72  Reassuring.       Medications   amiodarone tablet 100 mg (has no administration in time range)   atorvastatin (LIPITOR) tablet 20 mg (has no administration in time range)   bumetanide (BUMEX) tablet 2 mg (has no administration in time range)   metoprolol succinate (TOPROL-XL) 24 hr tablet 25 mg (has no administration in time range)   rivaroxaban (XARELTO) tablet 15 mg (has no administration in time range)   sertraline (ZOLOFT) tablet 25 mg (has no administration in time range)   acetaminophen (TYLENOL) tablet 650 mg (has no administration in time range)   ceftriaxone (ROCEPHIN) 1 g/50 mL in dextrose IVPB (0 mg Intravenous Stopped 2/4/25 1104)   prasugrel (EFFIENT) tablet 5 mg (has no administration in time range)       ED Risk Strat Scores                                               History of Present Illness       Chief Complaint   Patient presents with    Psychiatric Evaluation     Pt presents after making suicidal comments at a meeting at [facility] where  is currently living. Hx dementia       Past Medical History:   Diagnosis Date    Abnormal liver enzymes 01/21/2021    Acute (reversible) ischemia of small intestine, extent unspecified (HCC) 01/14/2022    Anal fissure     Cardiac disorder     Closed fracture of fifth metacarpal bone of right hand 11/13/2023    Closed nondisplaced fracture of fifth metacarpal bone of right hand, unspecified portion of metacarpal, initial encounter 11/17/2023    Cognitive changes 12/23/2020    Edema of left upper extremity 01/22/2021    Esophageal reflux 12/15/2023    Esophageal reflux 12/15/2023    Esophagitis, reflux     Fall 10/04/2022    Hemorrhoids     Hepatic hemangioma     Last Assessed: 1/13/2015    Herpes zoster     History of colonic polyps     Hypertension     Ischemic colitis (HCC)     Lumbar herniated disc     Malignant neoplasm without specification of site ([facility])     Memory loss 05/03/2021    Multiple contusions 11/12/2023    Nephrolithiasis     L. 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    Urinary retention 01/22/2021    Uterovaginal prolapse     Grade I-II    Wound of right leg 12/22/2022      Past Surgical History:   Procedure Laterality Date    APPENDECTOMY  1947    CARDIAC PACEMAKER PLACEMENT  01/2021    CARDIAC SURGERY      CABG    CATARACT EXTRACTION Bilateral     COLONOSCOPY  2012    Fiberoptic    COLONOSCOPY      Resolved: 2006 - 2012 5 year f/u    CORONARY ANGIOPLASTY WITH STENT PLACEMENT      CORONARY ARTERY BYPASS GRAFT       Resolved: 2012    ESOPHAGOGASTRODUODENOSCOPY  2012    Diagnostic    HEMORROIDECTOMY      KNEE SURGERY      LITHOTRIPSY      Renal    MALIGNANT SKIN LESION EXCISION      Face; Resolved: 2004    AK ESOPHAGOGASTRODUODENOSCOPY TRANSORAL DIAGNOSTIC N/A 4/13/2016    Procedure: EGD AND COLONOSCOPY;  Surgeon: Evelin Bone MD;  [facility]: [facility];  Service: Gastroenterology    RENAL ARTERY STENT      SKIN LESION EXCISION      Scalp    SOFT TISSUE TUMOR RESECTION      Shoulder; Resolved: 1995    SPLIT THICKNESS SKIN GRAFT Right 12/14/2023    Procedure: SKIN GRAFT SPLIT THICKNESS (STSG)  EXTREMITY; VAC application;  Surgeon: Ap Brito DO;  Location: [facility];  Service: General    THROMBOLYSIS      Postoperative Thrombolysis PTCA    TONSILLECTOMY      Resolved: 1944    WOUND DEBRIDEMENT Right 12/8/2023    Procedure: DEBRIDEMENT LOWER EXTREMITY (WASH OUT); POSSIBLE VAC APPLICATION;  Surgeon: Abhijeet Davies MD;  Location: [facility];  Service: General      Family History   Problem Relation Age of Onset    Heart disease Mother     Hypertension Mother     Osteoporosis Mother     Heart disease Father     Hypertension Father     No Known Problems Sister     Heart disease Brother     Diabetes Brother     No Known Problems Daughter     No Known Problems Son     No Known Problems Maternal Grandmother     No Known Problems Maternal Grandfather     No Known Problems Paternal Grandmother     No Known Problems Paternal Grandfather     Ulcerative colitis Family     Colon polyps Family     Breast cancer Neg Hx     Colon cancer Neg Hx     Ovarian cancer Neg Hx       Social History     Tobacco Use    Smoking status: Never    Smokeless tobacco: Never   Vaping Use    Vaping status: Never Used   Substance Use Topics    Alcohol use: Yes     Alcohol/week: 1.0 standard drink of alcohol     Types: 1 Glasses of wine per week     Comment: occasionally, but no alcohol intake recently    Drug use: Never      E-Cigarette/Vaping     E-Cigarette Use Never User       E-Cigarette/Vaping Substances    Nicotine No     THC No     CBD No     Flavoring No     Other No     Unknown No       I have reviewed and agree with the history as documented.     84-year-old female presenting to the ED with confusion.  According to EMS, patient was at the nursing home in which her  is being taken care of at today and making suicidal comments.  The nursing home staff were worried and called 911 to have patient transferred here for evaluation.  Patient does not live at the nursing home and then lives by herself in her own home.  According to EMS, the patient was having a fight with nursing staff about what her  should be doing for care and this is when she started commenting that she was going to kill herself.  On further history, it is noted that patient has memory concerns and will get hostile when annoyed and make these comments however has never acted on them.  I spoke with the patient's daughter who states that she has been attempting to get the patient evaluated multiple times for dementia however every time she tries, the patient leaves and is not evaluated.  Patient otherwise has no complaints and wishes to leave.  302 had been initiated but not upheld yet at the time of the patient coming to the ED.        Review of Systems   Unable to perform ROS: Mental status change (Patient does not have capacity and is not answering questions appropriately.)           Objective       ED Triage Vitals [02/04/25 1521]   Temperature Pulse Blood Pressure Respirations SpO2 Patient Position - Orthostatic VS   98.2 °F (36.8 °C) 76 107/52 18 97 % --      Temp Source Heart Rate Source BP Location FiO2 (%) Pain Score    Oral Monitor Right arm -- --      Vitals      Date and Time Temp Pulse SpO2 Resp BP Pain Score FACES Pain Rating User   02/04/25 2240 -- 76 98 % 18 148/64 -- -- KL   02/04/25 1521 98.2 °F (36.8 °C) 76 97 % 18 107/52 -- -- KL            Physical  Exam  Constitutional:       Appearance: Normal appearance.   HENT:      Head: Normocephalic and atraumatic.      Right Ear: External ear normal.      Left Ear: External ear normal.      Nose: Nose normal.      Mouth/Throat:      Mouth: Mucous membranes are moist.      Pharynx: Oropharynx is clear.   Eyes:      Extraocular Movements: Extraocular movements intact.      Pupils: Pupils are equal, round, and reactive to light.   Cardiovascular:      Rate and Rhythm: Normal rate.      Pulses: Normal pulses.   Pulmonary:      Effort: Pulmonary effort is normal.      Breath sounds: Normal breath sounds.   Abdominal:      General: Bowel sounds are normal.      Palpations: Abdomen is soft.   Musculoskeletal:         General: Normal range of motion.      Cervical back: Normal range of motion.   Skin:     General: Skin is warm.      Capillary Refill: Capillary refill takes less than 2 seconds.   Neurological:      General: No focal deficit present.      Mental Status: She is alert and oriented to person, place, and time.   Psychiatric:         Mood and Affect: Mood normal.         Behavior: Behavior normal.         Results Reviewed       Procedure Component Value Units Date/Time    Urine Microscopic [[id number]]  (Abnormal) Collected: 02/04/25 1848    Lab Status: Final result Specimen: Urine, Clean Catch Updated: 02/04/25 2047     RBC, UA 2-4 /hpf      WBC, UA Innumerable /hpf      Epithelial Cells Occasional /hpf      Bacteria, UA Moderate /hpf     Urine culture [[id number]] Collected: 02/04/25 1848    Lab Status: In process Specimen: Urine, Clean Catch Updated: 02/04/25 2047    UA w Reflex to Microscopic w Reflex to Culture [[id number]]  (Abnormal) Collected: 02/04/25 1848    Lab Status: Final result Specimen: Urine, Clean Catch Updated: 02/04/25 1922     Color, UA Yellow     Clarity, UA Turbid     Specific Gravity, UA 1.013     pH, UA 7.0     Leukocytes, UA Large     Nitrite, UA Positive     Protein, UA Trace mg/dl       Glucose, UA Negative mg/dl      Ketones, UA Negative mg/dl      Urobilinogen, UA <2.0 mg/dl      Bilirubin, UA Negative     Occult Blood, UA Trace    Basic metabolic panel [[id number]] Collected: 02/04/25 1734    Lab Status: Final result Specimen: Blood from Hand, Right Updated: 02/04/25 1814     Sodium 139 mmol/L      Potassium 3.8 mmol/L      Chloride 100 mmol/L      CO2 31 mmol/L      ANION GAP 8 mmol/L      BUN 16 mg/dL      Creatinine 0.96 mg/dL      Glucose 85 mg/dL      Calcium 9.6 mg/dL      eGFR 54 ml/min/1.73sq m     Narrative:      National Kidney Disease Foundation guidelines for Chronic Kidney Disease (CKD):     Stage 1 with normal or high GFR (GFR > 90 mL/min/1.73 square meters)    Stage 2 Mild CKD (GFR = 60-89 mL/min/1.73 square meters)    Stage 3A Moderate CKD (GFR = 45-59 mL/min/1.73 square meters)    Stage 3B Moderate CKD (GFR = 30-44 mL/min/1.73 square meters)    Stage 4 Severe CKD (GFR = 15-29 mL/min/1.73 square meters)    Stage 5 End Stage CKD (GFR <15 mL/min/1.73 square meters)  Note: GFR calculation is accurate only with a steady state creatinine    CBC and differential [[id number]] Collected: 02/04/25 1734    Lab Status: Final result Specimen: Blood from Hand, Right Updated: 02/04/25 1755     WBC 7.38 Thousand/uL      RBC 4.54 Million/uL      Hemoglobin 13.8 g/dL      Hematocrit 41.4 %      MCV 91 fL      MCH 30.4 pg      MCHC 33.3 g/dL      RDW 13.9 %      MPV 10.8 fL      Platelets 203 Thousands/uL      nRBC 0 /100 WBCs      Segmented % 66 %      Immature Grans % 0 %      Lymphocytes % 21 %      Monocytes % 8 %      Eosinophils Relative 4 %      Basophils Relative 1 %      Absolute Neutrophils 4.86 Thousands/µL      Absolute Immature Grans 0.03 Thousand/uL      Absolute Lymphocytes 1.53 Thousands/µL      Absolute Monocytes 0.62 Thousand/µL      Eosinophils Absolute 0.29 Thousand/µL      Basophils Absolute 0.05 Thousands/µL     Fingerstick Glucose (POCT) [[id number]]  (Normal) Collected:  25 1523    Lab Status: Final result Specimen: Blood Updated: 25 1525     POC Glucose 72 mg/dl             No orders to display       Procedures    ED Medication and Procedure Management   Prior to Admission Medications   Prescriptions Last Dose Informant Patient Reported? Taking?   Debrox 6.5 % otic solution   No No   Sig: ADMINISTER 5 DROPS TO THE RIGHT EAR 2 (TWO) TIMES A DAY FOR 4 DAYS   Patient not taking: Reported on 2024   acetaminophen (TYLENOL) 325 mg tablet  Spouse/Significant Other No No   Sig: Take 2 tablets (650 mg total) by mouth every 4 (four) hours as needed for mild pain   amiodarone 200 mg tablet   No No   Sig: Take 0.5 tablets (100 mg total) by mouth daily   atorvastatin (LIPITOR) 20 mg tablet   No No   Sig: Take 1 tablet (20 mg total) by mouth daily with dinner   bumetanide (BUMEX) 2 mg tablet   No No   Sig: TAKE 1 TABLET BY MOUTH EVERY DAY   levETIRAcetam (Keppra) 500 mg tablet  Spouse/Significant Other No No   Sig: Take 1 tablet (500 mg total) by mouth every 12 (twelve) hours Do not start before 2023.   Patient not taking: Reported on 3/22/2024   metoprolol succinate (TOPROL-XL) 25 mg 24 hr tablet   No No   Sig: Take 1 tablet (25 mg total) by mouth daily   multivitamin (THERAGRAN) TABS  Spouse/Significant Other Yes No   Sig: Take 1 tablet by mouth daily.   Patient not taking: Reported on 2024   nitroglycerin (NITROSTAT) 0.4 mg SL tablet  Spouse/Significant Other No No   Sig: Place 1 tablet (0.4 mg total) under the tongue every 5 (five) minutes as needed for chest pain   polyethylene glycol (MIRALAX) 17 g packet  Spouse/Significant Other No No   Sig: Take 17 g by mouth daily as needed (Constipation) for up to 5 days   polyethylene glycol (MIRALAX) 17 g packet   No No   Sig: Take 17 g by mouth daily for 7 days   prasugrel (EFFIENT) tablet   No No   Si TABLET DAILY   rivaroxaban (Xarelto) 15 mg tablet   No No   Sig: Take 1 tablet (15 mg total) by mouth daily  with breakfast   senna (SENOKOT) 8.6 mg  Spouse/Significant Other No No   Sig: Take 2 tablets (17.2 mg total) by mouth daily for 7 days Do not start before November 14, 2023.   sertraline (ZOLOFT) 25 mg tablet  Spouse/Significant Other No No   Sig: TAKE 1 TABLET BY MOUTH EVERY DAY   triamcinolone (KENALOG) 0.1 % cream   No No   Sig: Apply topically 2 (two) times a day for 7 days      Facility-Administered Medications: None     Patient's Medications   Discharge Prescriptions    No medications on file     No discharge procedures on file.  ED SEPSIS DOCUMENTATION   Time reflects when diagnosis was documented in both MDM as applicable and the Disposition within this note       Time User Action Codes Description Comment    2/4/2025  6:23 PM Dominguez José [R41.3] Memory loss     2/4/2025  6:24 PM Dominguez José [Z00.8] Encounter for psychological evaluation     2/4/2025 10:03 PM Erika Child [G93.41] Metabolic encephalopathy     2/4/2025 10:03 PM Erika Child [R41.89] Cognitive impairment                  Dominguez José MD  02/05/25 0001

## 2025-02-04 NOTE — ED NOTES
"Jacqui met with patient to advise her of her rights and complete a brief assessment.  Patient will need medical clearance, but in an effort to gauge her demeanor and advise the ED Attending / Resident, Jacqui did engage the patient.  She was oriented to person and place.  She does not know the date.  She became angry when questioned, but anger seemed to dissipate rapidly.  She denies suicidal intent.  She stated that she \"doesn't have the guts\" to attempt suicide.  She denies past suicide attempts and self injurious behaviors.  She denies any homicidal ideas, plan, intent. She denies harm to others.  She became angry and annoyed when asked about depression.  She does appear to be upset about her 's condition.  She described her mood as \"angry\" about being brought to the hospital.  She admits that she cannot recall where she was prior to the hospital.  She knows she was not at home, but cannot recall where she was (reportedly visiting with ).  She does appear anxious.  She denied hallucinations and delusions.  There is an irritable edge to her throughout the exchange.  She states that she does not know why she was brought here.  Jacqui explained that someone was concerned because she made suicidal statements and she has been forgetful and angry, resulting in her firing a home health worker.  She stated that this was because the woman was in her office repeatedly despite the patient telling her not to go in there because she has private tax documents in there.  She did express some paranoia, stating that she feels she is being used and that someone sent her here for money.  She does not appear to be aware that her daughter petitioned, and in order to preserve their relationship, Jacqui simply told her that [facility] authorized the petition.  Jacqui did explain that patient must remain in the hospital and participate in medical and crisis evaluations. She was assured that if appropriate, she " would be returned home, but if the physician shares concerns presented, she may need to be admitted.  Crisis advised her of her 302 rights, but given her irritability and obvious difficulty retaining information, her understanding is questionable.  She did request that her daughter be contacted.  A call was placed to daughter.  Voicemail message was left, requesting that she call the Charge RN to speak with her mother as requested.  Patient was advised of same.  302 rights and Patient Bill of Rights served.  Providers updated.  Crisis to complete full assessment pending medical clearance.

## 2025-02-04 NOTE — ED NOTES
"Crisis met with patient's daughter, Jessica, and [facility] Crisis Worker, Elza.  302 petition received at 1518.  Reviewed concerns and petition.  Patient has been declining with cognition, but has been reluctant to participate in evaluations, so there has been no formal Dementia diagnosis.  She will visit her  in his [place] then forget she was there.  She has been forgetful and agitated with family.  She gets angry easily and fired a Home Health Aide worker recently.  She makes suicidal statements, which daughter says she has done for \"years\" as a manipulative tactic when upset.  Daughter states, \"do I actually think she will go up on 33?  No, but she is smart enough to start a car and with my dad getting sicker, I worry she might...\"  Daughter has worked to get patient into Assisted Living and even arranged for her to go with her , but patient refused to stay there.  Patient insists on managing her own finances and refuses assessments and select medications.  Daughter was cautioned that 302 may not be upheld by the ED physician depending upon her presentation in the ED, but assured her there would at least be a medical evaluation / clearance and a crisis assessment.  Counseled daughter on the potential benefits of pursuing plenary guardianship through an .    "

## 2025-02-05 PROBLEM — F03.90 DEMENTIA (HCC): Status: ACTIVE | Noted: 2021-06-14

## 2025-02-05 LAB
ANION GAP SERPL CALCULATED.3IONS-SCNC: 6 MMOL/L (ref 4–13)
BASOPHILS # BLD AUTO: 0.03 THOUSANDS/ΜL (ref 0–0.1)
BASOPHILS NFR BLD AUTO: 1 % (ref 0–1)
BUN SERPL-MCNC: 17 MG/DL (ref 5–25)
CALCIUM SERPL-MCNC: 9.3 MG/DL (ref 8.4–10.2)
CHLORIDE SERPL-SCNC: 102 MMOL/L (ref 96–108)
CO2 SERPL-SCNC: 32 MMOL/L (ref 21–32)
CREAT SERPL-MCNC: 1.01 MG/DL (ref 0.6–1.3)
EOSINOPHIL # BLD AUTO: 0.26 THOUSAND/ΜL (ref 0–0.61)
EOSINOPHIL NFR BLD AUTO: 5 % (ref 0–6)
ERYTHROCYTE [DISTWIDTH] IN BLOOD BY AUTOMATED COUNT: 13.9 % (ref 11.6–15.1)
GFR SERPL CREATININE-BSD FRML MDRD: 50 ML/MIN/1.73SQ M
GLUCOSE SERPL-MCNC: 87 MG/DL (ref 65–140)
HCT VFR BLD AUTO: 40 % (ref 34.8–46.1)
HGB BLD-MCNC: 13 G/DL (ref 11.5–15.4)
IMM GRANULOCYTES # BLD AUTO: 0.01 THOUSAND/UL (ref 0–0.2)
IMM GRANULOCYTES NFR BLD AUTO: 0 % (ref 0–2)
LYMPHOCYTES # BLD AUTO: 1.1 THOUSANDS/ΜL (ref 0.6–4.47)
LYMPHOCYTES NFR BLD AUTO: 20 % (ref 14–44)
MAGNESIUM SERPL-MCNC: 2.1 MG/DL (ref 1.9–2.7)
MCH RBC QN AUTO: 29.5 PG (ref 26.8–34.3)
MCHC RBC AUTO-ENTMCNC: 32.5 G/DL (ref 31.4–37.4)
MCV RBC AUTO: 91 FL (ref 82–98)
MONOCYTES # BLD AUTO: 0.63 THOUSAND/ΜL (ref 0.17–1.22)
MONOCYTES NFR BLD AUTO: 12 % (ref 4–12)
NEUTROPHILS # BLD AUTO: 3.37 THOUSANDS/ΜL (ref 1.85–7.62)
NEUTS SEG NFR BLD AUTO: 62 % (ref 43–75)
NRBC BLD AUTO-RTO: 0 /100 WBCS
PLATELET # BLD AUTO: 181 THOUSANDS/UL (ref 149–390)
PMV BLD AUTO: 10.7 FL (ref 8.9–12.7)
POTASSIUM SERPL-SCNC: 3.6 MMOL/L (ref 3.5–5.3)
RBC # BLD AUTO: 4.4 MILLION/UL (ref 3.81–5.12)
SODIUM SERPL-SCNC: 140 MMOL/L (ref 135–147)
TSH SERPL DL<=0.05 MIU/L-ACNC: 3 UIU/ML (ref 0.45–4.5)
WBC # BLD AUTO: 5.4 THOUSAND/UL (ref 4.31–10.16)

## 2025-02-05 PROCEDURE — 83735 ASSAY OF MAGNESIUM: CPT | Performed by: PHYSICIAN ASSISTANT

## 2025-02-05 PROCEDURE — 85025 COMPLETE CBC W/AUTO DIFF WBC: CPT | Performed by: PHYSICIAN ASSISTANT

## 2025-02-05 PROCEDURE — 99223 1ST HOSP IP/OBS HIGH 75: CPT | Performed by: FAMILY MEDICINE

## 2025-02-05 PROCEDURE — 97167 OT EVAL HIGH COMPLEX 60 MIN: CPT

## 2025-02-05 PROCEDURE — 36415 COLL VENOUS BLD VENIPUNCTURE: CPT | Performed by: PHYSICIAN ASSISTANT

## 2025-02-05 PROCEDURE — 84443 ASSAY THYROID STIM HORMONE: CPT | Performed by: FAMILY MEDICINE

## 2025-02-05 PROCEDURE — 99232 SBSQ HOSP IP/OBS MODERATE 35: CPT | Performed by: FAMILY MEDICINE

## 2025-02-05 PROCEDURE — NC001 PR NO CHARGE: Performed by: PSYCHIATRY & NEUROLOGY

## 2025-02-05 PROCEDURE — 97163 PT EVAL HIGH COMPLEX 45 MIN: CPT

## 2025-02-05 PROCEDURE — 80048 BASIC METABOLIC PNL TOTAL CA: CPT | Performed by: PHYSICIAN ASSISTANT

## 2025-02-05 RX ORDER — OLANZAPINE 10 MG/2ML
2.5 INJECTION, POWDER, FOR SOLUTION INTRAMUSCULAR EVERY 8 HOURS PRN
Status: DISCONTINUED | OUTPATIENT
Start: 2025-02-05 | End: 2025-02-07

## 2025-02-05 RX ORDER — OLANZAPINE 10 MG/2ML
5 INJECTION, POWDER, FOR SOLUTION INTRAMUSCULAR ONCE
Status: DISCONTINUED | OUTPATIENT
Start: 2025-02-05 | End: 2025-02-07

## 2025-02-05 RX ORDER — SENNOSIDES 8.6 MG
2 TABLET ORAL 2 TIMES DAILY
Status: DISCONTINUED | OUTPATIENT
Start: 2025-02-05 | End: 2025-02-21 | Stop reason: HOSPADM

## 2025-02-05 RX ORDER — WATER 10 ML/10ML
INJECTION INTRAMUSCULAR; INTRAVENOUS; SUBCUTANEOUS
Status: DISPENSED
Start: 2025-02-05 | End: 2025-02-05

## 2025-02-05 RX ORDER — WATER 10 ML/10ML
INJECTION INTRAMUSCULAR; INTRAVENOUS; SUBCUTANEOUS
Status: COMPLETED
Start: 2025-02-05 | End: 2025-02-05

## 2025-02-05 RX ORDER — ACETAMINOPHEN 325 MG/1
975 TABLET ORAL 3 TIMES DAILY
Status: DISCONTINUED | OUTPATIENT
Start: 2025-02-05 | End: 2025-02-21

## 2025-02-05 RX ADMIN — OLANZAPINE 2.5 MG: 10 INJECTION, POWDER, LYOPHILIZED, FOR SOLUTION INTRAMUSCULAR at 07:50

## 2025-02-05 RX ADMIN — MELATONIN 3 MG: 3 TAB ORAL at 01:05

## 2025-02-05 RX ADMIN — MELATONIN 3 MG: 3 TAB ORAL at 21:07

## 2025-02-05 RX ADMIN — RIVAROXABAN 15 MG: 15 TABLET, FILM COATED ORAL at 09:13

## 2025-02-05 RX ADMIN — CEFTRIAXONE SODIUM 1000 MG: 10 INJECTION, POWDER, FOR SOLUTION INTRAVENOUS at 21:31

## 2025-02-05 RX ADMIN — OLANZAPINE 2.5 MG: 10 INJECTION, POWDER, LYOPHILIZED, FOR SOLUTION INTRAMUSCULAR at 15:48

## 2025-02-05 RX ADMIN — WATER: 1 INJECTION INTRAMUSCULAR; INTRAVENOUS; SUBCUTANEOUS at 17:47

## 2025-02-05 RX ADMIN — METOPROLOL SUCCINATE 25 MG: 25 TABLET, EXTENDED RELEASE ORAL at 09:13

## 2025-02-05 RX ADMIN — BUMETANIDE 2 MG: 1 TABLET ORAL at 09:13

## 2025-02-05 RX ADMIN — SERTRALINE HYDROCHLORIDE 25 MG: 25 TABLET ORAL at 09:13

## 2025-02-05 RX ADMIN — SENNOSIDES 17.2 MG: 8.6 TABLET ORAL at 11:50

## 2025-02-05 RX ADMIN — AMIODARONE HYDROCHLORIDE 100 MG: 200 TABLET ORAL at 09:13

## 2025-02-05 RX ADMIN — ACETAMINOPHEN 975 MG: 325 TABLET, FILM COATED ORAL at 21:07

## 2025-02-05 RX ADMIN — PRASUGREL 5 MG: 10 TABLET, FILM COATED ORAL at 09:12

## 2025-02-05 NOTE — ED NOTES
Pt out of room, in hallway, yelling at staff. Pt yelling that she wants to go home and that she's wants a . Pt assisted to restroom and then back to room. 1:1 sitter remains bedside.      Edith Carranza RN  02/05/25 0061

## 2025-02-05 NOTE — ASSESSMENT & PLAN NOTE
- In 2021 patient scored 18/30 on MoCA  - CT head shows moderate microangiopathic changes  - Mini Cog 3/5 on exam today  Pt. Is AAOx 1-2 at baseline   Currently AAOx2  Pt. Lives at home by herself.   Independent for instrumental ADLs. She used to have a home health aide, but recently fired her.  Independent for ADLs  No behaviors  TSH 1.531  Recommend checking B12  [place] to person, place, time, and situation as needed  Monitor for behaviors   Monitor for mental status change  Provide supportive care   - Switch sertraline to lexapro 5 mg  - Recommend supervision with medication management

## 2025-02-05 NOTE — ASSESSMENT & PLAN NOTE
Wt Readings from Last 3 Encounters:   09/27/24 53 kg (116 lb 12.8 oz)   08/16/24 55.4 kg (122 lb 1.6 oz)   08/15/24 56.1 kg (123 lb 9.6 oz)       Appears euvolemic   Continue PO bumex 2mg daily   I/O, Daily weights  Stable

## 2025-02-05 NOTE — ASSESSMENT & PLAN NOTE
Wt Readings from Last 3 Encounters:   09/27/24 53 kg (116 lb 12.8 oz)   08/16/24 55.4 kg (122 lb 1.6 oz)   08/15/24 56.1 kg (123 lb 9.6 oz)               No associated orders from this encounter found during lookback period of 72 hours.

## 2025-02-05 NOTE — CONSULTS
Psychiatric Evaluation - Department of Behavioral Health   Verito Fallon 84 y.o. female MRN: [id number]  Unit/Bed#: ED-30 Encounter: [id number]    ASSESSMENT & PLAN     Assessment & Plan  Dementia (HCC)  Verito Fallon is a 84 y.o., overtly appearing , retired female, denying any past psychiatric history medically complicated by dementia, recurrent UTI, presenting to [facility], subsequent to making suicidal statements.  Psychiatry was consulted due to the suicidal statements made.     The patient is currently denying suicidal ideation or ever making suicidal statements.  She however did admit she may have made suicidal statements out of frustration.  She was adamant that she would never act on suicidal thoughts if she did have them.  She explained it goes against her Taoist.  She also is denying any history of suicidal ideation/attempts.  She denied all psychiatric symptoms.    Treatment Recommendations/Precautions:  Patient does not currently meet criteria for inpatient psychiatric hospitalization.  Would recommend continuing as needed Zyprexa for agitation  No other medication recommendations at this time  Disposition per primary team  Psychiatry will sign off on this patient for now.  Please feel free to reach out to the consult psychiatry team if there are any questions or concerns.  No associated orders from this encounter found during lookback period of 72 hours.    Physician Requesting Consult: Ryan Byrd MD  Reason for Consult / Principal Problem: Suicidal ideation, cognitive impairment    HISTORY OF PRESENT ILLNESS (HPI)     Verito Fallon is a 84 y.o., overtly appearing , retired female, denying any past psychiatric history medically complicated by dementia, recurrent UTI, presenting to [facility], subsequent to making suicidal statements.  Psychiatry was consulted due to the suicidal statements made.  "    Throughout interview patient was mildly agitated but mostly pleasant and cooperative.  She was moderately disoriented and frustrated that she was in the hospital.  She did not appear to have a good grasp of the situation that led to her hospitalization.  Currently she is denying ever making suicidal statements.  However, she does admit that she could have potentially made a suicidal statement out of frustration.  She added that she would never commit suicide due to her Caodaism.  She does admit that her  has guns in their home for hunting purposes but she does not know where they are or how to operate them.  She does feel like she would be safe, from a psychiatric standpoint, to return home.  Patient denied any history of suicide attempts or self-harm.  She denied any homicidal ideation or history of violence.  She is currently denying all psychiatric symptoms.  She endorsed her mood is \"good until I was here.\"    PSYCHIATRIC REVIEW OF SYSTEMS     Appetite: Denied  Adverse eating: Denied  Weight changes: Denied  Insomnia/sleeplessness: Denied  Fatigue/anergy: Denied  Anhedonia/lack of interest: Denied  Attention/concentration: Denied  Psychomotor agitation/retardation: Denied  Somatic symptoms: Denied  Anxiety/panic attack: Denied  Darlyn/hypomania: Denied  Hopelessness/helplessness/worthlessness: Denied  Self-injurious behavior/high-risk behavior: Denied  Suicidal ideation: Currently denying  Homicidal ideation: Denied  Auditory hallucinations: Denied  Visual hallucinations: Denied  Other perceptual disturbances: Denied  Delusional thinking: Denied  Obsessive/compulsive symptoms: Denied    REVIEW OF SYSTEMS   Pertinent items are noted in HPI.    HISTORICAL INFORMATION     Past Psychiatric History:   Past Psychiatric management: Denied  Past Suicide attempts/self-injurious behavior: Denied  Past Violent behavior: Denied  Past Psychiatric medications: Denied    Substance Abuse History:  I spent time with " patient in counseling and education on risk of substance abuse. Assessed him motivation and encouraged patient for treatment. Brief intervention done.   Social History       Tobacco History       Smoking Status  Never      Smokeless Tobacco Use  Never              Alcohol History       Alcohol Use Status  Yes Drinks/Week  1 Glasses of wine per week Amount  1.0 standard drink of alcohol/wk Comment  occasionally, but no alcohol intake recently              Drug Use       Drug Use Status  Never              Sexual Activity       Sexually Active  Not Currently              Other Factors    Not Asked                   I have assessed this patient for substance use within the past 12 months    Family Psychiatric History:   Denied    Social History:  Education: high school diploma/GED  Learning Disabilities: None  Marital history:   Living arrangement, social support: The patient lives at home alone.  Occupational History: retired  Functioning Relationships: good support system.  Other Pertinent History: Has firearms at home but lacks access.      Traumatic History:   Abuse: Denied  Other Traumatic Events: Denied    OBJECTIVE     Past Medical History:   Diagnosis Date    Abnormal liver enzymes 01/21/2021    Acute (reversible) ischemia of small intestine, extent unspecified (HCC) 01/14/2022    Anal fissure     Cardiac disorder     Closed fracture of fifth metacarpal bone of right hand 11/13/2023    Closed nondisplaced fracture of fifth metacarpal bone of right hand, unspecified portion of metacarpal, initial encounter 11/17/2023    Cognitive changes 12/23/2020    Edema of left upper extremity 01/22/2021    Esophageal reflux 12/15/2023    Esophageal reflux 12/15/2023    Esophagitis, reflux     Fall 10/04/2022    Hemorrhoids     Hepatic hemangioma     Last Assessed: 1/13/2015    Herpes zoster     History of colonic polyps     Hypertension     Ischemic colitis (HCC)     Lumbar herniated disc     Malignant neoplasm  "without specification of site ([facility])     Memory loss 05/03/2021    Multiple contusions 11/12/2023    Nephrolithiasis     L. 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    Urinary retention 01/22/2021    Uterovaginal prolapse     Grade I-II    Wound of right leg 12/22/2022       Meds/Allergies   all current active meds have been reviewed  Allergies   Allergen Reactions    Sulfa Antibiotics Anaphylaxis    Formaldehyde     Codeine Palpitations       Vital signs in last 24 hours:  Temp:  [98.2 °F (36.8 °C)] 98.2 °F (36.8 °C)  HR:  [76] 76  BP: (107-148)/(52-65) 113/65  Resp:  [16-18] 16  SpO2:  [97 %-98 %] 98 %  O2 Device: None (Room air)  No intake or output data in the 24 hours ending 02/05/25 1221    Screenings:    Laboratory results:  I have personally reviewed all pertinent laboratory/tests results.    Imaging Studies: Reviewed    EKG, Pathology, and Other Studies: Reviewed    Mental Status Evaluation:  Appearance:  Overtly  female, appears stated age, wearing casual clothing, good grooming/hygiene   Behavior:  Pleasant cooperative, mild agitation at times   Speech:  Normal rate, volume, rhythm   Mood:  \"Good before I was here.\"   Affect:  Normal range and intensity   Language: WNL   Thought Process:  circumstantial   Thought Content:  No overt delusions or paranoia   Perceptual Disturbances: Denied auditory and visual hallucinations.  Did not appear to be responding to internal stimuli.   Risk Potential: Suicidal Ideations none, Homicidal Ideations none, and Potential for Aggression Yes due to agitation   Sensorium:  Disoriented to person, place, time, and situation   Cognition:  recent and remote memory " grossly intact   Consciousness:  alert and awake    Attention: attention span and concentration were age appropriate   Intellect: within normal limits   Fund of Knowledge: WNL   Insight:  limited   Judgment: limited   Muscle Strength and Tone: Did not assess   Gait/Station: Did not assess   Motor Activity: no abnormal movements     Memory: Short and long term memory intact     Code Status: Level 3 - DNAR and DNI    Advance Directive and Living Will: Yes      Power of :      POLST:      Counseling/Coordination of Care    I have expended greater than 30 minutes in which more than 50% of this time was expended in counseling/coordination of patient care relating to diagnostic results, prognosis, potential risks and benefits of management options, instructions for appropriate management, patient and/or collateral education, importance of adherence to management and/or risk factor reductions. Patient's rights, confidentiality, exceptions to confidentiality, use of electronic medical record including appropriate staff access to medical record regarding behavioral health services and consent to treatment were reviewed.    Note Share:    This note was shared with patient.    This note has been constructed using a voice recognition system. There may be translation, syntax,  or grammatical errors. If you have any questions, please contact the dictating provider.    Leonardo Cardenas DO 02/05/25  PGY-II

## 2025-02-05 NOTE — ASSESSMENT & PLAN NOTE
Lab Results   Component Value Date    EGFR 50 02/05/2025    EGFR 54 02/04/2025    EGFR 44 07/05/2024    CREATININE 1.01 02/05/2025    CREATININE 0.96 02/04/2025    CREATININE 1.13 07/05/2024     Cr currently stable at baseline   Monitor BMP

## 2025-02-05 NOTE — PLAN OF CARE
Problem: PHYSICAL THERAPY ADULT  Goal: Performs mobility at highest level of function for planned discharge setting.  See evaluation for individualized goals.  Description: Treatment/Interventions: ADL retraining, Functional transfer training, LE strengthening/ROM, Elevations, Therapeutic exercise, Endurance training, Cognitive reorientation, Patient/family training, Equipment eval/education, Bed mobility, Gait training, Compensatory technique education, Spoke to nursing, Spoke to case management, OT          See flowsheet documentation for full assessment, interventions and recommendations.  Note:    Problem List: Decreased strength, Decreased endurance, Impaired balance, Decreased mobility, Decreased coordination, Decreased cognition, Impaired judgement, Decreased safety awareness  Assessment: Patient seen for Physical Therapy evaluation. Patient admitted with Metabolic encephalopathy.  Comorbidities affecting patient's physical performance include: CHF, A-fib, PAD, HLD, SSS, CAD, CABG, cognitive impairment, osteoporosis, seizure, pacemaker.  Personal factors affecting patient at time of initial evaluation include: lives in one story house, stairs to enter home, inability to navigate community distances, inability to navigate level surfaces without external assistance, inability to perform dynamic tasks in community, decreased cognition, and limited insight into impairments. Prior to admission, patient was independent with functional mobility without assistive device, independent with ADLS, independent with IADLS, living alone in one story home with 3 steps to enter, and ambulating household distance.  Please find objective findings from Physical Therapy assessment regarding body systems outlined above with impairments and limitations including weakness, impaired balance, decreased endurance, impaired coordination, gait deviations, decreased activity tolerance, decreased functional mobility tolerance, decreased  safety awareness, impaired judgement, orthopedic restrictions, and decreased cognition.  The Barthel Index was used as a functional outcome tool presenting with a score of Barthel Index Score: 50 today indicating marked limitations of functional mobility and ADLS.  Patient's clinical presentation is currently unstable/unpredictable as seen in patient's presentation of vital sign response, varying levels of cognitive performance, increased fall risk, new onset of impairment of functional mobility, decreased endurance, and new onset of weakness. Pt would benefit from continued Physical Therapy treatment to address deficits as defined above and maximize level of functional mobility. As demonstrated by objective findings, the assigned level of complexity for this evaluation is high.The patient's -[facility] Basic Mobility Inpatient Short Form Raw Score is 17. A Raw score of greater than 16 suggests the patient may benefit from discharge to home. Please also refer to the recommendation of the Physical Therapist for safe discharge planning.  Barriers to Discharge: Inaccessible home environment, Decreased caregiver support  Barriers to Discharge Comments: Patient lives alone; 3 steps to enter; marked cognitive impairment; at risk for falls and subsequent readmission  Rehab Resource Intensity Level, PT: II (Moderate Resource Intensity)    See flowsheet documentation for full assessment.

## 2025-02-05 NOTE — CONSULTS
Consultation - Neuropsychology/Psychology Department  Verito Fallon 84 y.o. female MRN: [id number]  Unit/Bed#: ED-30 Encounter: [id number]        Reason for Consultation:  Verito Fallon is a 84 y.o. year old female who was referred for a Neuropsychological exam to assess cognitive functioning and comment on capacity to make informed medical decisions.      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)    A-fib (HCC)    PAD (peripheral artery disease) (HCC)    Hyperlipidemia    Metabolic encephalopathy    Depression    Current use of long term anticoagulation    Long term current use of amiodarone    Renal artery stenosis (HCC)    Pulmonary hypertension (HCC)    Sick sinus syndrome (HCC)    Hx of CABG    CAD (coronary artery disease)    Dementia (HCC)    Atherosclerosis with claudication of extremity (HCC)    Carotid stenosis, asymptomatic, bilateral    Cervical disc disorder with radiculopathy of mid-cervical region    Cervical spondylosis    Pruritic rash    Leg hematoma, right, subsequent encounter    History of seizure    At risk for constipation    At risk for delirium    Advance care planning    Hyperkalemia    Hypokalemia    Chronic kidney disease, stage 3b (HCC)    Osteoporosis    Presence of permanent cardiac pacemaker    Hypertensive heart and 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 (HCC)    Acute on chronic combined systolic and diastolic congestive heart failure (HCC)         Historical Information   Past Medical History:   Diagnosis Date    Abnormal liver enzymes 01/21/2021    Acute (reversible) ischemia of small intestine, extent unspecified (HCC) 01/14/2022    Anal fissure     Cardiac disorder     Closed fracture of fifth metacarpal bone of right hand 11/13/2023    Closed nondisplaced  fracture of fifth metacarpal bone of right hand, unspecified portion of metacarpal, initial encounter 11/17/2023    Cognitive changes 12/23/2020    Edema of left upper extremity 01/22/2021    Esophageal reflux 12/15/2023    Esophageal reflux 12/15/2023    Esophagitis, reflux     Fall 10/04/2022    Hemorrhoids     Hepatic hemangioma     Last Assessed: 1/13/2015    Herpes zoster     History of colonic polyps     Hypertension     Ischemic colitis (HCC)     Lumbar herniated disc     Malignant neoplasm without specification of site (HCC)     Memory loss 05/03/2021    Multiple contusions 11/12/2023    Nephrolithiasis     L. 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    Urinary retention 01/22/2021    Uterovaginal prolapse     Grade I-II    Wound of right leg 12/22/2022     Past Surgical History:   Procedure Laterality Date    APPENDECTOMY  1947    CARDIAC PACEMAKER PLACEMENT  01/2021    CARDIAC SURGERY      CABG    CATARACT EXTRACTION Bilateral     COLONOSCOPY  2012    Fiberoptic    COLONOSCOPY      Resolved: 2006 - 2012 5 year f/u    CORONARY ANGIOPLASTY WITH STENT PLACEMENT      CORONARY ARTERY BYPASS GRAFT      Resolved: 2012    ESOPHAGOGASTRODUODENOSCOPY  2012    Diagnostic    HEMORROIDECTOMY      KNEE SURGERY      LITHOTRIPSY      Renal    MALIGNANT SKIN LESION EXCISION      Face; Resolved: 2004    [place] ESOPHAGOGASTRODUODENOSCOPY TRANSORAL DIAGNOSTIC N/A 4/13/2016    Procedure: EGD AND COLONOSCOPY;  Surgeon: Evelin Bone MD;  Location: [facility];  Service: Gastroenterology    RENAL ARTERY STENT      SKIN LESION EXCISION      Scalp    SOFT TISSUE TUMOR RESECTION      Shoulder; Resolved: 1995    SPLIT THICKNESS SKIN GRAFT  Right 12/14/2023    Procedure: SKIN GRAFT SPLIT THICKNESS (STSG)  EXTREMITY; VAC application;  Surgeon: Ap Brito DO;  Location: [facility];  Service: General    THROMBOLYSIS      Postoperative Thrombolysis PTCA    TONSILLECTOMY      Resolved: 1944    WOUND DEBRIDEMENT Right 12/8/2023    Procedure: DEBRIDEMENT LOWER EXTREMITY (WASH OUT); POSSIBLE VAC APPLICATION;  Surgeon: Abhijeet Davies MD;  Location: [facility];  Service: General     Social History   Social History     Substance and Sexual Activity   Alcohol Use Yes    Alcohol/week: 1.0 standard drink of alcohol    Types: 1 Glasses of wine per week    Comment: occasionally, but no alcohol intake recently     Social History     Substance and Sexual Activity   Drug Use Never     Social History     Tobacco Use   Smoking Status Never   Smokeless Tobacco Never     Family History:   Family History   Problem Relation Age of Onset    Heart disease Mother     Hypertension Mother     Osteoporosis Mother     Heart disease Father     Hypertension Father     No Known Problems Sister     Heart disease Brother     Diabetes Brother     No Known Problems Daughter     No Known Problems Son     No Known Problems Maternal Grandmother     No Known Problems Maternal Grandfather     No Known Problems Paternal Grandmother     No Known Problems Paternal Grandfather     Ulcerative colitis Family     Colon polyps Family     Breast cancer Neg Hx     Colon cancer Neg Hx     Ovarian cancer Neg Hx        Meds/Allergies   current meds:   Current Facility-Administered Medications:     acetaminophen (TYLENOL) tablet 650 mg, Q6H PRN    acetaminophen (TYLENOL) tablet 975 mg, TID    amiodarone tablet 100 mg, Daily    atorvastatin (LIPITOR) tablet 20 mg, Daily With Dinner    bumetanide (BUMEX) tablet 2 mg, Daily    ceftriaxone (ROCEPHIN) 1 g/50 mL in dextrose IVPB, Q24H, Last Rate: Stopped (02/04/25 3669)    melatonin tablet 3 mg, HS    metoprolol succinate (TOPROL-XL) 24 hr tablet 25  mg, Daily    OLANZapine (ZyPREXA) IM injection 2.5 mg, Q8H PRN    OLANZapine (ZyPREXA) IM injection 5 mg, Once    prasugrel (EFFIENT) tablet 5 mg, Daily    rivaroxaban (XARELTO) tablet 15 mg, Daily With Breakfast    senna (SENOKOT) tablet 17.2 mg, BID    sertraline (ZOLOFT) tablet 25 mg, Daily    Allergies   Allergen Reactions    Sulfa Antibiotics Anaphylaxis    Formaldehyde     Codeine Palpitations         Family and Social Support:   No data recorded    Behavioral Observations: Patient was alert, UNABLE to accurately state the year, month, day/week, day/month, city and name of hospital; affect appeared appropriate to content and patient denied depressed mood and anxiety; patient was unable to provide medical history, states she does not know how she was transported to hospital or for what reason; patient reported she is unable to locate her  who is reportedly in a hospital; states her children will not tell her where he is located    Cognitive Examination    General Cognitive Functioning MMSE = [id number];     Attention/Concentration Auditory Selective Attention = Within Normal Limits;  Auditory Vigilance = Within Normal Limits; Information Processing Speed = Within Normal Limits    Frontal Systems/Executive Functioning Mental Flexibility/Cognitive Control = Impaired; Working Memory = Within Normal Limits Abstract Reasoning = Impaired; Generative Ability = Within Normal Limits,     Language Functioning Confrontation naming = Within Normal Limits, Phonemic Fluency = Averge; Semantic Retrieval = Within Normal Limits; Comprehension of Complex Ideational Material = Impaired; Praxis = Within Normal Limits; Repetition = Within Normal Limits; Basic Reading = Impaired; Following Commands = Impaired    Memory Functioning Narrative Recall - Short Delay = Impaired; Long Delay Narrative Recall = Impaired;     Visuo-Spatial Abilities Not Assessed    Functional Knowledge  Health & Safety Knowledge = Impaired;      Summary/Impression:  Results of Neuropsychological Exam revealed diffuse cognitive dysfunction and on a measure assessing awareness of personal health status and ability to evaluate health problems, handle medical emergencies and take safety precautions, patient performed in the IMPAIRED range of functioning. During this encounter, patient does not appear to have capacity to make fully informed medical decisions.

## 2025-02-05 NOTE — ASSESSMENT & PLAN NOTE
Lab Results   Component Value Date    EGFR 50 02/05/2025    EGFR 54 02/04/2025    EGFR 44 07/05/2024    CREATININE 1.01 02/05/2025    CREATININE 0.96 02/04/2025    CREATININE 1.13 07/05/2024       No associated orders from this encounter found during lookback period of 72 hours.

## 2025-02-05 NOTE — ASSESSMENT & PLAN NOTE
Patient with history of cognitive impairment presents to the ER on 302 due to concern regarding suicidal statements while she was visiting her  in the nursing home today. 302 not upheld by the ER provider as patient not currently expressing suicidal intent.   Patient's daughter states that Verito's cognition has been steadily declining for some time and has been recently exacerbated by her  being sick and placed in a nursing home. Prior to this, her  was primary caretaker. Patient has expressed suicidal intent in the past according to daughter however this has been used as a manipulative tactic.   Patient is currently oriented to self only. She currently denies SI/HI.   UA appears to be infected which may be exacerbating her confusion   IV Ceftriaxone for suspected UTI   Delirium precautions   Continual observation 1:1 pending formal psych evaluation   Geriatrics consult appreciated   PT/OT/CM

## 2025-02-05 NOTE — ED ATTENDING ATTESTATION
2/4/2025  I, Zia Rojas MD, saw and evaluated the patient. I have discussed the patient with the resident/non-physician practitioner and agree with the resident's/non-physician practitioner's findings, Plan of Care, and MDM as documented in the resident's/non-physician practitioner's note, except where noted. All available labs and Radiology studies were reviewed.  I was present for key portions of any procedure(s) performed by the resident/non-physician practitioner and I was immediately available to provide assistance.       At this point I agree with the current assessment done in the Emergency Department.  I have conducted an independent evaluation of this patient a history and physical is as follows: Patient presentation is more consistent with dementia than psychiatric.  No history of diagnosed psychiatric disorders.  Comes with 302 however 302 is not appropriate given patient's lack of ongoing suicidal ideation, no plan, no insight into her condition.  She appears to be unsafe living in her current home situation, family agrees.  Will admit for safety, consideration of geriatrics consultation, social work evaluation, likely ultimate placement.    Results Reviewed       Procedure Component Value Units Date/Time    UA w Reflex to Microscopic w Reflex to Culture [[id number]] Collected: 02/04/25 1848    Lab Status: In process Specimen: Urine, Clean Catch Updated: 02/04/25 1859    Basic metabolic panel [[id number]] Collected: 02/04/25 1734    Lab Status: Final result Specimen: Blood from Hand, Right Updated: 02/04/25 1814     Sodium 139 mmol/L      Potassium 3.8 mmol/L      Chloride 100 mmol/L      CO2 31 mmol/L      ANION GAP 8 mmol/L      BUN 16 mg/dL      Creatinine 0.96 mg/dL      Glucose 85 mg/dL      Calcium 9.6 mg/dL      eGFR 54 ml/min/1.73sq m     Narrative:      National Kid[employer] Disease Foundation guidelines for Chronic Kidney Disease (CKD):     Stage 1 with normal or high GFR (GFR > 90  mL/min/1.73 square meters)    Stage 2 Mild CKD (GFR = 60-89 mL/min/1.73 square meters)    Stage 3A Moderate CKD (GFR = 45-59 mL/min/1.73 square meters)    Stage 3B Moderate CKD (GFR = 30-44 mL/min/1.73 square meters)    Stage 4 Severe CKD (GFR = 15-29 mL/min/1.73 square meters)    Stage 5 End Stage CKD (GFR <15 mL/min/1.73 square meters)  Note: GFR calculation is accurate only with a steady state creatinine    CBC and differential [[id number]] Collected: 02/04/25 1734    Lab Status: Final result Specimen: Blood from Hand, Right Updated: 02/04/25 1755     WBC 7.38 Thousand/uL      RBC 4.54 Million/uL      Hemoglobin 13.8 g/dL      Hematocrit 41.4 %      MCV 91 fL      MCH 30.4 pg      MCHC 33.3 g/dL      RDW 13.9 %      MPV 10.8 fL      Platelets 203 Thousands/uL      nRBC 0 /100 WBCs      Segmented % 66 %      Immature Grans % 0 %      Lymphocytes % 21 %      Monocytes % 8 %      Eosinophils Relative 4 %      Basophils Relative 1 %      Absolute Neutrophils 4.86 Thousands/µL      Absolute Immature Grans 0.03 Thousand/uL      Absolute Lymphocytes 1.53 Thousands/µL      Absolute Monocytes 0.62 Thousand/µL      Eosinophils Absolute 0.29 Thousand/µL      Basophils Absolute 0.05 Thousands/µL     Fingerstick Glucose (POCT) [[id number]]  (Normal) Collected: 02/04/25 1523    Lab Status: Final result Specimen: Blood Updated: 02/04/25 1525     POC Glucose 72 mg/dl               ED Course  ED Course as of 02/04/25 1918 Tue Feb 04, 2025   1612 Patient with no active thoughts of self-harm.  States she does not have the guts to harm herself.  She laughed when asked if she was suicidal and states that she would not even know where to start.         Critical Care Time  Procedures

## 2025-02-05 NOTE — ASSESSMENT & PLAN NOTE
Wt Readings from Last 3 Encounters:   09/27/24 53 kg (116 lb 12.8 oz)   08/16/24 55.4 kg (122 lb 1.6 oz)   08/15/24 56.1 kg (123 lb 9.6 oz)

## 2025-02-05 NOTE — OCCUPATIONAL THERAPY NOTE
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)    A-fib (HCC)    PAD (peripheral artery disease) (HCC)    Hyperlipidemia    Sick sinus syndrome (HCC)    CAD (coronary artery disease)    Chronic kidney disease, stage 3b (HCC)    Past Medical History  Past Medical History:   Diagnosis Date    Abnormal liver enzymes 01/21/2021    Acute (reversible) ischemia of small intestine, extent unspecified (HCC) 01/14/2022    Anal fissure     Cardiac disorder     Closed fracture of fifth metacarpal bone of right hand 11/13/2023    Closed nondisplaced fracture of fifth metacarpal bone of right hand, unspecified portion of metacarpal, initial encounter 11/17/2023    Cognitive changes 12/23/2020    Edema of left upper extremity 01/22/2021    Esophageal reflux 12/15/2023    Esophageal reflux 12/15/2023    Esophagitis, reflux     Fall 10/04/2022    Hemorrhoids     Hepatic hemangioma     Last Assessed: 1/13/2015    Herpes zoster     History of colonic polyps     Hypertension     Ischemic colitis (HCC)     Lumbar herniated disc     Malignant neoplasm without specification of site ([facility])     Memory loss 05/03/2021    Multiple contusions 11/12/2023    Nephrolithiasis     L. 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    Urinary retention 01/22/2021    Uterovaginal prolapse     Grade I-II    Wound of right leg 12/22/2022     Past Surgical History  Past Surgical History:   Procedure Laterality Date    APPENDECTOMY  1947    CARDIAC PACEMAKER PLACEMENT   01/2021    CARDIAC SURGERY      CABG    CATARACT EXTRACTION Bilateral     COLONOSCOPY  2012    Fiberoptic    COLONOSCOPY      Resolved: 2006 - 2012 5 year f/u    CORONARY ANGIOPLASTY WITH STENT PLACEMENT      CORONARY ARTERY BYPASS GRAFT      Resolved: 2012    ESOPHAGOGASTRODUODENOSCOPY  2012    Diagnostic    HEMORROIDECTOMY      KNEE SURGERY      LITHOTRIPSY      Renal    MALIGNANT SKIN LESION EXCISION      Face; Resolved: 2004    FL ESOPHAGOGASTRODUODENOSCOPY TRANSORAL DIAGNOSTIC N/A 4/13/2016    Procedure: EGD AND COLONOSCOPY;  Surgeon: Evelin Bone MD;  [facility]: [facility];  Service: Gastroenterology    RENAL ARTERY STENT      SKIN LESION EXCISION      Scalp    SOFT TISSUE TUMOR RESECTION      Shoulder; Resolved: 1995    SPLIT THICKNESS SKIN GRAFT Right 12/14/2023    Procedure: SKIN GRAFT SPLIT THICKNESS (STSG)  EXTREMITY; VAC application;  Surgeon: Ap Brito DO;  Location: [facility];  Service: General    THROMBOLYSIS      Postoperative Thrombolysis PTCA    TONSILLECTOMY      Resolved: 1944    WOUND DEBRIDEMENT Right 12/8/2023    Procedure: DEBRIDEMENT LOWER EXTREMITY (WASH OUT); POSSIBLE VAC APPLICATION;  Surgeon: Abhijeet Davies MD;  Location[facility];  Service: General             02/05/25 0912   OT Last Visit   OT Visit Date 02/05/25   Note Type   Note type Evaluation   Pain Assessment   Pain Assessment Tool 0-10   Pain Score No Pain   Restrictions/Precautions   Weight Bearing Precautions Per Order No   Other Precautions Cognitive;Chair Alarm;Bed Alarm;Fall Risk   Home Living   Type of Home House   Home Layout One level  (3 LUDWIN)   Bathroom Shower/Tub Walk-in shower   Bathroom Toilet Standard   Bathroom Equipment Grab bars in shower;Shower chair;Commode   Bathroom Accessibility Accessible   Home Equipment Walker;Cane   Additional Comments no use of AD at baseline   Prior Function   Level of Cooper Independent with ADLs   Lives With Alone  ( currently at [facility])   Receives Help  "From Family   IADLs   (Pt reports (I) with all IADLs, per chart review pt's  used to complete)   Falls in the last 6 months 0   Vocational Retired   Comments Pt is a POOR historian - providing conflicting information regarding PLOF and home set up   Lifestyle   Autonomy PTA pt living alone in 1SH, pt (I) with ADLS and IADLs, (-)falls, (+)drives no use of AD at baseline   Reciprocal Relationships supportive  - however currently at [employer]   Service to Others retired   Intrinsic Gratification spending time with    General   Additional Pertinent History Admit due to confusion and disorientation. Admitted for hopes of 302- however not upheld due to lack of suicidal ideation.. PMH: a fib, SSS, CAD, CKD, PAD   Family/Caregiver Present No   Subjective   Subjective \"I just want to know where my  went\"   ADL   Eating Assistance 5  Supervision/Setup   Grooming Assistance 5  Supervision/Setup   Grooming Deficit Supervision/safety;Increased time to complete   UB Bathing Assistance 5  Supervision/Setup   LB Bathing Assistance 4  Minimal Assistance   UB Dressing Assistance 5  Supervision/Setup   LB Dressing Assistance 4  Minimal Assistance   Toileting Assistance  5  Supervision/Setup   Toileting Deficit Increased time to complete;Clothing management down;Perineal hygiene;Clothing management up;Grab bar use   Bed Mobility   Supine to Sit 4  Minimal assistance   Additional items Increased time required;Verbal cues   Sit to Supine 4  Minimal assistance   Additional items Assist x 1;Increased time required;Verbal cues;LE management   Transfers   Sit to Stand 4  Minimal assistance   Additional items Assist x 1;Increased time required;Verbal cues   Stand to Sit 4  Minimal assistance   Additional items Assist x 1;Increased time required;Verbal cues   Toilet transfer 4  Minimal assistance   Additional items Assist x 1;Increased time required;Verbal cues;Standard toilet  (requiring cuing for location of grab " "bar)   Additional Comments HHA for transfer   Functional Mobility   Functional Mobility 4  Minimal assistance   Additional Comments Ax1, pt grasping therapist's hand for support. Functional household distance   Additional items Hand hold assistance   Balance   Static Sitting Fair   Dynamic Sitting Fair -   Static Standing Poor +   Dynamic Standing Poor +   Ambulatory Poor +   Activity Tolerance   Activity Tolerance Other (Comment)  (limited by cognition)   Medical Staff Made Aware PT OMER Fishman, CM Charley   RUE Assessment   RUE Assessment WFL   LUE Assessment   LUE Assessment WFL   Hand Function   Gross Motor Coordination Functional   Fine Motor Coordination Functional   Vision-Basic Assessment   Current Vision   (Has glasses, states \"I am supposed to wear them all the time\" but wears them as needed)   Cognition   Overall Cognitive Status Impaired   Arousal/Participation Alert;Cooperative   Attention Attends with cues to redirect   Orientation Level Oriented to person;Disoriented to place;Disoriented to time;Disoriented to situation   Memory Decreased short term memory;Decreased recall of recent events;Decreased recall of precautions   Following Commands Follows one step commands with increased time or repetition   Comments pleasantly confused, hyperfocused on finding her . Poor awareness to current situation - requiring cuing for re-orientation multiple times during session. Pt noted with hx of MoCA 10/18/2021 and scored an 18/30. Her previous MOCA was 6/14/21 and she scored a 19/30.   Assessment   Limitation Decreased ADL status;Decreased Safe judgement during ADL;Decreased cognition;Decreased endurance;Decreased self-care trans;Decreased high-level ADLs  (impaired balance, fxnl mobility, act tor, fxnl reach, standing tor, strength, attention to task, safety awareness, insight, pacing, problem solving, learning new tasks)   Prognosis Good   Assessment Pt is a 84 y.o. female seen for OT evaluation s/p " admission to [facility] on 2/4/2025 due to confusion. Diagnosed with Metabolic encephalopathy. Personal and env factors supporting pt at time of IE include (I) PLOF and FFSU. Personal and env factors inhibiting engagement in occupations include advanced age, lifestyle patterns, limited social support, and difficulty completing IADLs. Performance deficits that affect the pt’s occupational performance can be seen above. Due to pt's current functional limitations and medical complications pt is functioning below baseline. Pt would benefit from continued skilled OT treatment in order to maximize safety, independence and overall performance with ADLs, functional mobility, functional transfers, and cognition in order to achieve highest level of function.   Goals   Patient Goals to find her    LTG Time Frame 10-14   Long Term Goal see goals listed below   Plan   Treatment Interventions ADL retraining;Functional transfer training;Endurance training;Cognitive reorientation;Patient/family training;Equipment evaluation/education;Compensatory technique education;Energy conservation;Activityengagement   Goal Expiration Date 02/15/25   OT Treatment Day 0   OT Frequency 3-5x/wk   Discharge Recommendation   Rehab Resource Intensity Level, OT II (Moderate Resource Intensity)  (HIGHLY recommend Fitness to Drive Evaluation s/p level 2 resources)   AM-PAC Daily Activity Inpatient   Lower Body Dressing 3   Bathing 3   Toileting 3   Upper Body Dressing 3   Grooming 3   Eating 3   Daily Activity Raw Score 18   Daily Activity Standardized Score (Calc for Raw Score >=11) 38.66   AM-PAC Applied Cognition Inpatient   Following a Speech/Presentation 2   Understanding Ordinary Conversation 3   Taking Medications 1   Remembering Where Things Are Placed or Put Away 1   Remembering List of 4-5 Errands 1   Taking Care of Complicated Tasks 1   Applied Cognition Raw Score 9   Applied Cognition Standardized Score 22.48   End of Consult   Patient  Position at End of Consult Bedside chair;Bed/Chair alarm activated;All needs within reach        GOALS:        -Patient will perform grooming tasks standing at sink with overall mod I in order to increase overall independence    -Patient will be Mod I with UB dressing using AE and AD as needed in order to increase (I) with ADLs    -Patient will be Mod I with UB bathing using AE and AD as needed in order to increase (I) with ADLs    -Patient will be Mod I with LB dressing with use of AE and AD as needed in order to increase (I) with ADLs    -Patient will be Mod I with LB bathing with use of AE and AD as needed in order to increase (I) with ADLs    -Patient will complete toileting w/ Mod I w/ G hygiene/thoroughness in order to reduce caregiver burden    -Patient will demonstrate Mod I with bed mobility for ability to manage own comfort and initiate OOB tasks.     -Patient will perform functional transfers with Mod I to/from all surfaces using DME as needed in order to increase (I) with functional tasks    -Patient will be Mod I with functional mobility to/from bathroom for increased independence with toileting tasks    -Patient will engage in ongoing cognitive assessment in order to assist with safe discharge planning/recommendations. RECOMMEND ACLS     -Patient will attend to functional task for 10 min without VC for attention/redirection         The patient's raw score on the -PAC Daily Activity Inpatient Short Form is 18. A raw score of less than 19 suggests the patient may benefit from discharge to post-acute rehabilitation services. HOWEVER please refer to the recommendation of the Occupational Therapist for safe discharge planning.    Elena Cardozo MS, OTR/L

## 2025-02-05 NOTE — ASSESSMENT & PLAN NOTE
- Per pharmacy patient last picked up her prescription for Atorvastatin in the fall and would have run out. She has not refilled the prescription  - Continue statin  - Management per primary team

## 2025-02-05 NOTE — PROGRESS NOTES
Progress Note - Hospitalist   Name: Verito Fallon 84 y.o. female I MRN: [id number]  Unit/Bed#: ED-30 I Date of Admission: 2/4/2025   Date of Service: 2/5/2025 I Hospital Day: 0    Assessment & Plan  Metabolic encephalopathy  Patient with history of cognitive impairment presents to the ER on 302 due to concern regarding suicidal statements while she was visiting her  in the nursing home today. 302 not upheld by the ER provider as patient not currently expressing suicidal intent.   Patient's daughter states that Verito's cognition has been steadily declining for some time and has been recently exacerbated by her  being sick and placed in a nursing home. Prior to this, her  was primary caretaker. Patient has expressed suicidal intent in the past according to daughter however this has been used as a manipulative tactic.   Patient is currently oriented to self only. She currently denies SI/HI.   UA appears to be infected which may be exacerbating her confusion   IV Ceftriaxone for suspected UTI   Delirium precautions   Continual observation 1:1 pending formal psych evaluation   Geriatrics consult appreciated   PT/OT/CM   I will also get a neuropsychiatric evaluation.  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)  Wt Readings from Last 3 Encounters:   09/27/24 53 kg (116 lb 12.8 oz)   08/16/24 55.4 kg (122 lb 1.6 oz)   08/15/24 56.1 kg (123 lb 9.6 oz)       Appears euvolemic   Continue PO bumex 2mg daily   I/O, Daily weights  Stable      A-fib (HCC)  Rate controlled   Continue Xarelto, amiodarone, Toprol XL   PAD (peripheral artery disease) (HCC)  Continue Prasugrel, statin   Hyperlipidemia  Continue statin   Sick sinus syndrome (HCC)  S/p PPM   CAD (coronary artery disease)  S/p CABG   Continue Prasugrel, Xarelto, BB, statin   Chronic kidney disease, stage 3b (HCC)  Lab Results    Component Value Date    EGFR 50 02/05/2025    EGFR 54 02/04/2025    EGFR 44 07/05/2024    CREATININE 1.01 02/05/2025    CREATININE 0.96 02/04/2025    CREATININE 1.13 07/05/2024     Cr currently stable at baseline   Monitor BMP    VTE Pharmacologic Prophylaxis: VTE Score: 4 Moderate Risk (Score 3-4) - Pharmacological DVT Prophylaxis Ordered: rivaroxaban (Xarelto).    Mobility:   Basic Mobility Inpatient Raw Score: 17  JH-HLM Goal: 5: Stand one or more mins  JH-HLM Achieved: 7: Walk 25 feet or more  JH-HLM Goal achieved. Continue to encourage appropriate mobility.    Patient Centered Rounds: I performed bedside rounds with nursing staff today.   Discussions with Specialists or Other Care Team Provider: Psychiatry    Education and Discussions with Family / Patient: Updated  (daughter) via phone.  I discussed with the daughter who is concerned with her mother being at home.  She does not think the mom is making the right decisions.  Concerned about her safety.  Apparently the  was found not to have capacity last time he was in the hospital as well.      Current Length of Stay: 0 day(s)  Current Patient Status: Observation   Certification Statement: The patient is going to require greater than 2 midnights secondary to having a urinary tract infection IV antibiotics and also need a neuropsychiatric evaluation for safe disposition.  Discharge Plan: Anticipate discharge in 48-72 hrs to discharge location to be determined pending rehab evaluations.    Code Status: Level 3 - DNAR and DNI    Subjective   Patient seen and examined.  Initially states that she would rather hurt herself then be here but then retracted right away.    Objective :  Temp:  [98.2 °F (36.8 °C)] 98.2 °F (36.8 °C)  HR:  [76] 76  BP: (107-148)/(52-65) 113/65  Resp:  [16-18] 16  SpO2:  [97 %-98 %] 98 %  O2 Device: None (Room air)    There is no height or weight on file to calculate BMI.     Input and Output Summary (last 24 hours):    No intake or output data in the 24 hours ending 02/05/25 1118    Physical Exam  General Appearance:    Alert, cooperative, no distress, appears stated age                               Lungs:     Clear to auscultation bilaterally, respirations unlabored       Heart:    Regular rate and rhythm, S1 and S2 normal, no murmur, rub    or gallop   Abdomen:     Soft, non-tender, bowel sounds active all four quadrants,     no masses, no organomegaly           Extremities:   Extremities normal, atraumatic, no cyanosis or edema                         Lines/Drains:              Lab Results: I have reviewed the following results:   Results from last 7 days   Lab Units 02/05/25  0559   WBC Thousand/uL 5.40   HEMOGLOBIN g/dL 13.0   HEMATOCRIT % 40.0   PLATELETS Thousands/uL 181   SEGS PCT % 62   LYMPHO PCT % 20   MONO PCT % 12   EOS PCT % 5     Results from last 7 days   Lab Units 02/05/25  0559   SODIUM mmol/L 140   POTASSIUM mmol/L 3.6   CHLORIDE mmol/L 102   CO2 mmol/L 32   BUN mg/dL 17   CREATININE mg/dL 1.01   ANION GAP mmol/L 6   CALCIUM mg/dL 9.3   GLUCOSE RANDOM mg/dL 87         Results from last 7 days   Lab Units 02/04/25  1523   POC GLUCOSE mg/dl 72               Recent Cultures (last 7 days):         Imaging Results Review: No pertinent imaging studies reviewed.  Other Study Results Review: No additional pertinent studies reviewed.    Last 24 Hours Medication List:     Current Facility-Administered Medications:     acetaminophen (TYLENOL) tablet 650 mg, Q6H PRN    amiodarone tablet 100 mg, Daily    atorvastatin (LIPITOR) tablet 20 mg, Daily With Dinner    bumetanide (BUMEX) tablet 2 mg, Daily    ceftriaxone (ROCEPHIN) 1 g/50 mL in dextrose IVPB, Q24H, Last Rate: Stopped (02/04/25 2326)    melatonin tablet 3 mg, HS    metoprolol succinate (TOPROL-XL) 24 hr tablet 25 mg, Daily    OLANZapine (ZyPREXA) IM injection 2.5 mg, Q8H PRN    OLANZapine (ZyPREXA) IM injection 5 mg, Once    prasugrel (EFFIENT) tablet 5 mg,  Daily    rivaroxaban (XARELTO) tablet 15 mg, Daily With Breakfast    sertraline (ZOLOFT) tablet 25 mg, Daily    sterile water injection **ADS Override Pull**,     Administrative Statements   Today, Patient Was Seen By: Ryan Byrd MD  I have spent a total time of 25 minutes in caring for this patient on the day of the visit/encounter including Diagnostic results, Instructions for management, Importance of tx compliance, Risk factor reductions, Impressions, Counseling / Coordination of care, Documenting in the medical record, Reviewing / ordering tests, medicine, procedures  , Obtaining or reviewing history  , and Communicating with other healthcare professionals .    **Please Note: This note may have been constructed using a voice recognition system.**

## 2025-02-05 NOTE — ASSESSMENT & PLAN NOTE
Wt Readings from Last 3 Encounters:   02/06/25 52.3 kg (115 lb 4.8 oz)   09/27/24 53 kg (116 lb 12.8 oz)   08/16/24 55.4 kg (122 lb 1.6 oz)     - Appears stable  - On bumex 2 mg daily  - Management per primary team

## 2025-02-05 NOTE — ASSESSMENT & PLAN NOTE
Lab Results   Component Value Date    EGFR 52 02/06/2025    EGFR 50 02/05/2025    EGFR 54 02/04/2025    CREATININE 0.98 02/06/2025    CREATININE 1.01 02/05/2025    CREATININE 0.96 02/04/2025     Creatinine stable.  Will avoid nephrotoxic medication.  Encourage po hydration.  Will monitor BMP.

## 2025-02-05 NOTE — PLAN OF CARE
Problem: OCCUPATIONAL THERAPY ADULT  Goal: Performs self-care activities at highest level of function for planned discharge setting.  See evaluation for individualized goals.  Description: Treatment Interventions: ADL retraining, Functional transfer training, Endurance training, Cognitive reorientation, Patient/family training, Equipment evaluation/education, Compensatory technique education, Energy conservation, Activityengagement          See flowsheet documentation for full assessment, interventions and recommendations.   Note: Limitation: Decreased ADL status, Decreased Safe judgement during ADL, Decreased cognition, Decreased endurance, Decreased self-care trans, Decreased high-level ADLs (impaired balance, fxnl mobility, act tor, fxnl reach, standing tor, strength, attention to task, safety awareness, insight, pacing, problem solving, learning new tasks)  Prognosis: Good  Assessment: Pt is a 84 y.o. female seen for OT evaluation s/p admission to [facility] on 2/4/2025 due to confusion. Diagnosed with Metabolic encephalopathy. Personal and env factors supporting pt at time of IE include (I) PLOF and FFSU. Personal and env factors inhibiting engagement in occupations include advanced age, lifestyle patterns, limited social support, and difficulty completing IADLs. Performance deficits that affect the pt’s occupational performance can be seen above. Due to pt's current functional limitations and medical complications pt is functioning below baseline. Pt would benefit from continued skilled OT treatment in order to maximize safety, independence and overall performance with ADLs, functional mobility, functional transfers, and cognition in order to achieve highest level of function.     Rehab Resource Intensity Level, OT: II (Moderate Resource Intensity) (HIGHLY recommend Fitness to Drive Evaluation s/p level 2 resources)

## 2025-02-05 NOTE — CONSULTS
Consultation - Geriatric Medicine   Verito Fallon 84 y.o. female MRN: [id number]  Unit/Bed#: S -01 Encounter: [id number]      Assessment & Plan     Chronic kidney disease, stage 3b ([facility])  Assessment & Plan  Lab Results   Component Value Date    EGFR 50 02/05/2025    EGFR 54 02/04/2025    EGFR 44 07/05/2024    CREATININE 1.01 02/05/2025    CREATININE 0.96 02/04/2025    CREATININE 1.13 07/05/2024     Creatinine stable.  Will avoid nephrotoxic medication.  Encourage po hydration.  Will monitor BMP.     Dementia (HCC)  Assessment & Plan  - In 2021 patient scored 18/30 on MoCA  - CT head shows moderate microangiopathic changes  - Mini Cog 3/5 on exam today  Pt. Is AAOx 1-2 at baseline   Currently AAOx1  Pt. Lives at home by herself.   Independent for instrumental ADLs. She used to have a home health aide, but recently fired her.  Independent for ADLs  No behaviors  TSH 1.531  Recommend checking B12  [place] to person, place, time, and situation as needed  Monitor for behaviors   Monitor for mental status change  Provide supportive care   - Recommend supervision with medications  - Switch sertraline to lexapro 5 mg  Recommend supervision with medication management    UTI (urinary tract infection)  Assessment & Plan  Continue ceftriaxone  Monitor for urinary retention    Hyperlipidemia  Assessment & Plan  - Per pharmacy patient last picked up her prescription for Atorvastatin in the fall and would have run out. 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)  Assessment & Plan  Wt Readings from Last 3 Encounters:   09/27/24 53 kg (116 lb 12.8 oz)   08/16/24 55.4 kg (122 lb 1.6 oz)   08/15/24 56.1 kg (123 lb 9.6 oz)     - Appears stable  - On bumex 2 mg daily  - Management per primary team          * Metabolic encephalopathy  Assessment & Plan  -Patient is high risk of delirium due to dementia, hospitalization, and  metabolic imbalance, UTI  -Initiate delirium precautions  -maintain normal sleep/wake cycle  -minimize overnight interruptions, group overnight vitals/labs/nursing checks as possible  -dim lights, close blinds and turn off tv to minimize stimulation and encourage sleep environment in evenings  -ensure that pain is well controlled consider Tylenol 975mg Q8H scheduled   -monitor for fecal and urinary retention which may precipitate delirium  -encourage early mobilization and ambulation  -provide frequent reorientation and redirection  -encourage family and friends at the bedside to help calm patient if anxious  -avoid medications which may precipitate or worsen delirium such as tramadol, benzodiazepine, anticholinergics, and antihistaminics  -encourage hydration and nutrition , assist with feeding if needed  -redirect unwanted behaviors as first line, avoid physical restraints.   - Patient currently AAO x 1 and confused as to who put her in the hospital and why she is here  - UA shows signs of UTI.  - Patient receiving ceftriaxone  - Monitor for urinary retention.   - Bladder scan x 1  - Patient reports constipation and does not know when last BM was  - Added senna 2 tab BID  - seroquel 12.5 mg PRN q 8 h for agitation              History of Present Illness   Physician Requesting Consult: Ryan Byrd MD  Reason for Consult / Principal Problem: Metabolic encephalopathy  Hx and PE limited by: Altered mental status  Additional history obtained from: Daughter, Melyssa      HPI: Verito Fallon is a 84 y.o. year old female who presented to the ER after making suicidal comments while visiting her  at a nursing facility. The patient continues to ask why she is here and who put her here. She if very confused on exam and states she is frustrated with her situation. Patient lives alone in a single story home. She ambulates without any assistive devices at baseline. She manages her finances and medications. She  states she organizes her pills in a pill box and takes them as scheduled. She wears glasses. Denies use of hearing aids and dentures. She states she is continent of bowel and bladder. Patient is no longer driving. She stated if she has to go anywhere her friends take her or she walks. She states she lives near a grocery store. On exam today she is AAO x 1. She reported she was in a facility and it was 2004. Mini Cog score 3/5. Patient denied chest pain and reported mild suprapubic tenderness due to having to go to the bathroom. She reports she is constipated and has trouble having a bowel movement. She reports urinary frequency and having to use the bathroom every 15 minutes. Patient reports having breakfast this morning and that her appetite has been good.     Per MA: Patient has been on 1 to 1 since last night. She did get up and try to walk out and leave. She reports that the patient does get agitated and verbally aggressive. She states the patient was up every hour during the night to go to the bathroom. Patient was given IM Zyprexa this morning.     Per daughter: Patient does currently live at home alone.  The patient has had caregivers come in and out to help her with the cooking and cleaning and monitor while she is showering.  But recently the patient has fired the aides.  Daughter reports that she can get the third 1 this past week.  Daughter states that the patient's baseline is typically AAO x 2.  She does not usually the date or the year.  Patient is occasionally confused when she leaves the house as to where she is or if she is riding in her own personal car or someone else's.  Daughter confirms that the patient is no longer driving. Daughter states that she does not walk anywhere.  If the patient needs to go to the store or run an errand the daughter will take her.  Daughter states that she does manage her own finances and medications.  The daughter is concerned about her mom managing the finances  because she has been hiding the checkbook and the daughter recently had to switch some of her bills to have many payments.  The patient has given the daughter access to her checking account but the daughter does not have access to move any money and is the patient is adamantly against giving her daughter access.  Daughter states that the patient did have a recent fall couple weeks ago.  She does not have any kind of fall alert system in place.  The daughter recently convinced her to leave the garage door to the house unlocked so that the patient can use the garage door code and then get in the house if needed.    Medication List per pharmacy:  Bumetanide 2 mg once daily  Metoprolol ER 25 mg once daily  Prasugrel 10 mg tab once daily  Amiodarone 200 mg 1/2 tab daily  Xarelto 15 mg in October, 30 day supply, would have ran out  Atorvastatin 20 mg picked up in fall, would have ran out        Inpatient consult to Gerontology  Consult performed by: Marion Ma MD  Consult ordered by: Erika Child PA-C          Review of Systems   Constitutional:  Negative for chills and fever.   HENT:  Positive for tinnitus (Has had for years). Negative for hearing loss and sore throat.    Eyes:  Negative for pain and visual disturbance.   Respiratory:  Negative for cough, chest tightness and shortness of breath.    Cardiovascular:  Negative for chest pain and palpitations.   Gastrointestinal:  Positive for abdominal pain and constipation. Negative for nausea and vomiting. Abdominal distention: suprapubic tenderness.  Genitourinary:  Positive for frequency and urgency. Negative for dysuria and hematuria.   Musculoskeletal:  Negative for arthralgias and back pain.   Skin:  Negative for color change and rash.   Neurological:  Negative for syncope and headaches.   Psychiatric/Behavioral:  Positive for confusion.    All other systems reviewed and are negative.          Historical Information   Past Medical History:   Diagnosis Date     Abnormal liver enzymes 01/21/2021    Acute (reversible) ischemia of small intestine, extent unspecified (HCC) 01/14/2022    Anal fissure     Cardiac disorder     Closed fracture of fifth metacarpal bone of right hand 11/13/2023    Closed nondisplaced fracture of fifth metacarpal bone of right hand, unspecified portion of metacarpal, initial encounter 11/17/2023    Cognitive changes 12/23/2020    Edema of left upper extremity 01/22/2021    Esophageal reflux 12/15/2023    Esophageal reflux 12/15/2023    Esophagitis, reflux     Fall 10/04/2022    Hemorrhoids     Hepatic hemangioma     Last Assessed: 1/13/2015    Herpes zoster     History of colonic polyps     Hypertension     Ischemic colitis (HCC)     Lumbar herniated disc     Malignant neoplasm without specification of site ([facility])     Memory loss 05/03/2021    Multiple contusions 11/12/2023    Nephrolithiasis     L. 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    Urinary retention 01/22/2021    Uterovaginal prolapse     Grade I-II    Wound of right leg 12/22/2022     Past Surgical History:   Procedure Laterality Date    APPENDECTOMY  1947    CARDIAC PACEMAKER PLACEMENT  01/2021    CARDIAC SURGERY      CABG    CATARACT EXTRACTION Bilateral     COLONOSCOPY  2012    Fiberoptic    COLONOSCOPY      Resolved: 2006 - 2012 5 year f/u    CORONARY ANGIOPLASTY WITH STENT PLACEMENT      CORONARY ARTERY BYPASS GRAFT      Resolved: 2012    ESOPHAGOGASTRODUODENOSCOPY  2012    Diagnostic    HEMORROIDECTOMY      KNEE SURGERY      LITHOTRIPSY      Renal    MALIGNANT SKIN LESION EXCISION      Face; Resolved: 2004    WI ESOPHAGOGASTRODUODENOSCOPY TRANSORAL DIAGNOSTIC N/A 4/13/2016     Procedure: EGD AND COLONOSCOPY;  Surgeon: Evelin Bone MD;  Location: [facility];  Service: Gastroenterology    RENAL ARTERY STENT      SKIN LESION EXCISION      Scalp    SOFT TISSUE TUMOR RESECTION      Shoulder; Resolved: 1995    SPLIT THICKNESS SKIN GRAFT Right 12/14/2023    Procedure: SKIN GRAFT SPLIT THICKNESS (STSG)  EXTREMITY; VAC application;  Surgeon: Ap Brito DO;  Location: [facility];  Service: General    THROMBOLYSIS      Postoperative Thrombolysis PTCA    TONSILLECTOMY      Resolved: 1944    WOUND DEBRIDEMENT Right 12/8/2023    Procedure: DEBRIDEMENT LOWER EXTREMITY (WASH OUT); POSSIBLE VAC APPLICATION;  Surgeon: Abhijeet Davies MD;  Location: [facility];  Service: General     Social History   Social History     Substance and Sexual Activity   Alcohol Use Yes    Alcohol/week: 1.0 standard drink of alcohol    Types: 1 Glasses of wine per week    Comment: occasionally, but no alcohol intake recently     Social History     Substance and Sexual Activity   Drug Use Never     Social History     Tobacco Use   Smoking Status Never   Smokeless Tobacco Never     Family History:   Family History   Problem Relation Age of Onset    Heart disease Mother     Hypertension Mother     Osteoporosis Mother     Heart disease Father     Hypertension Father     No Known Problems Sister     Heart disease Brother     Diabetes Brother     No Known Problems Daughter     No Known Problems Son     No Known Problems Maternal Grandmother     No Known Problems Maternal Grandfather     No Known Problems Paternal Grandmother     No Known Problems Paternal Grandfather     Ulcerative colitis Family     Colon polyps Family     Breast cancer Neg Hx     Colon cancer Neg Hx     Ovarian cancer Neg Hx        Meds/Allergies   current meds:   Current Facility-Administered Medications:     acetaminophen (TYLENOL) tablet 650 mg, Q6H PRN    acetaminophen (TYLENOL) tablet 975 mg, TID    amiodarone tablet 100 mg, Daily    atorvastatin  (LIPITOR) tablet 20 mg, Daily With Dinner    bumetanide (BUMEX) tablet 2 mg, Daily    ceftriaxone (ROCEPHIN) 1 g/50 mL in dextrose IVPB, Q24H, Last Rate: Stopped (02/04/25 2326)    melatonin tablet 3 mg, HS    metoprolol succinate (TOPROL-XL) 24 hr tablet 25 mg, Daily    OLANZapine (ZyPREXA) IM injection 2.5 mg, Q8H PRN    OLANZapine (ZyPREXA) IM injection 5 mg, Once    prasugrel (EFFIENT) tablet 5 mg, Daily    rivaroxaban (XARELTO) tablet 15 mg, Daily With Breakfast    senna (SENOKOT) tablet 17.2 mg, BID    sertraline (ZOLOFT) tablet 25 mg, Daily    Allergies   Allergen Reactions    Sulfa Antibiotics Anaphylaxis    Formaldehyde     Codeine Palpitations       Objective   No intake or output data in the 24 hours ending 02/05/25 1920  Invasive Devices       Peripheral Intravenous Line  Duration             Peripheral IV 02/04/25 Right Forearm <1 day                    Physical Exam  Vitals and nursing note reviewed.   Constitutional:       General: She is not in acute distress.     Appearance: She is well-developed.   HENT:      Head: Normocephalic and atraumatic.      Right Ear: External ear normal.      Left Ear: External ear normal.      Mouth/Throat:      Mouth: Mucous membranes are dry.      Pharynx: Oropharynx is clear.   Eyes:      Conjunctiva/sclera: Conjunctivae normal.      Pupils: Pupils are equal, round, and reactive to light.   Cardiovascular:      Rate and Rhythm: Normal rate and regular rhythm.      Pulses: Normal pulses.      Heart sounds: Normal heart sounds. No murmur heard.  Pulmonary:      Effort: Pulmonary effort is normal. No respiratory distress.      Breath sounds: Normal breath sounds.   Abdominal:      General: Bowel sounds are normal.      Palpations: Abdomen is soft.      Tenderness: There is abdominal tenderness in the suprapubic area.   Musculoskeletal:         General: No swelling.      Cervical back: Neck supple.   Skin:     General: Skin is warm and dry.      Capillary Refill:  Capillary refill takes less than 2 seconds.   Neurological:      Mental Status: She is alert. She is disoriented.      Comments: AAO x 1   Psychiatric:         Mood and Affect: Mood normal.         Lab Results:   I have personally reviewed pertinent lab results including the following:  - CBC, CMP, UA    I have personally reviewed the following imaging study reports in PACS:  - CT head      Therapies:   PT: Patient would benefit from continued physical therapy.  PT following  OT: Pending evaluation.    VTE Prophylaxis: VTE covered by:  rivaroxaban, Oral, 15 mg at 02/05/25 0913        Code Status: Level 3 - DNAR and DNI  Advance Directive and Living Will: Yes    Power of :    POLST:      Family and Social Support: Daughter  No data recorded    Goals of Care: DNR    Thank you for allowing me to participate in your patients' care. Please do not hesitate to call with any additional questions.  Marion Ma MD

## 2025-02-05 NOTE — ASSESSMENT & PLAN NOTE
Wt Readings from Last 3 Encounters:   09/27/24 53 kg (116 lb 12.8 oz)   08/16/24 55.4 kg (122 lb 1.6 oz)   08/15/24 56.1 kg (123 lb 9.6 oz)       Appears euvolemic   Continue PO bumex 2mg daily   I/O, Daily weights

## 2025-02-05 NOTE — ASSESSMENT & PLAN NOTE
Patient with history of cognitive impairment presents to the ER on 302 due to concern regarding suicidal statements while she was visiting her  in the nursing home today. 302 not upheld by the ER provider as patient not currently expressing suicidal intent.   Patient's daughter states that Verito's cognition has been steadily declining for some time and has been recently exacerbated by her  being sick and placed in a nursing home. Prior to this, her  was primary caretaker. Patient has expressed suicidal intent in the past according to daughter however this has been used as a manipulative tactic.   Patient is currently oriented to self only. She currently denies SI/HI.   UA appears to be infected which may be exacerbating her confusion   IV Ceftriaxone for suspected UTI   Delirium precautions   Continual observation 1:1 pending formal psych evaluation   Geriatrics consult appreciated   PT/OT/CM   I will also get a neuropsychiatric evaluation.  If she does not have have capacity may need guardian but daughter is willing to pursue it if necessary

## 2025-02-05 NOTE — ASSESSMENT & PLAN NOTE
Verito Fallon is a 84 y.o., overtly appearing , retired female, denying any past psychiatric history medically complicated by dementia, recurrent UTI, presenting to [facility], subsequent to making suicidal statements.  Psychiatry was consulted due to the suicidal statements made.     The patient is currently denying suicidal ideation or ever making suicidal statements.  She however did admit she may have made suicidal statements out of frustration.  She was adamant that she would never act on suicidal thoughts if she did have them.  She explained it goes against her Mu-ism.  She also is denying any history of suicidal ideation/attempts.  She denied all psychiatric symptoms.    Treatment Recommendations/Precautions:  Patient does not currently meet criteria for inpatient psychiatric hospitalization.  Would recommend continuing as needed Zyprexa for agitation  No other medication recommendations at this time  Disposition per primary team  Psychiatry will sign off on this patient for now.  Please feel free to reach out to the consult psychiatry team if there are any questions or concerns.  No associated orders from this encounter found during lookback period of 72 hours.

## 2025-02-05 NOTE — H&P
H&P - Hospitalist   Name: Verito Fallon 84 y.o. female I MRN: [id number]  Unit/Bed#: ED-30 I Date of Admission: 2/4/2025   Date of Service: 2/5/2025 I Hospital Day: 0     Assessment & Plan  Metabolic encephalopathy  Patient with history of cognitive impairment presents to the ER on 302 due to concern regarding suicidal statements while she was visiting her  in the nursing home today. 302 not upheld by the ER provider as patient not currently expressing suicidal intent.   Patient's daughter states that Verito's cognition has been steadily declining for some time and has been recently exacerbated by her  being sick and placed in a nursing home. Prior to this, her  was primary caretaker. Patient has expressed suicidal intent in the past according to daughter however this has been used as a manipulative tactic.   Patient is currently oriented to self only. She currently denies SI/HI.   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)  Wt Readings from Last 3 Encounters:   09/27/24 53 kg (116 lb 12.8 oz)   08/16/24 55.4 kg (122 lb 1.6 oz)   08/15/24 56.1 kg (123 lb 9.6 oz)       Appears euvolemic   Continue PO bumex 2mg daily   I/O, Daily weights      A-fib (HCC)  Rate controlled   Continue Xarelto, amiodarone, Toprol XL   PAD (peripheral artery disease) (HCC)  Continue Prasugrel, statin   Hyperlipidemia  Continue statin   Sick sinus syndrome (HCC)  S/p PPM   CAD (coronary artery disease)  S/p CABG   Continue Prasugrel, Xarelto, BB, statin   Chronic kidney disease, stage 3b (HCC)  Lab Results   Component Value Date    EGFR 54 02/04/2025    EGFR 44 07/05/2024    EGFR 53 01/05/2024    CREATININE 0.96 02/04/2025    CREATININE 1.13 07/05/2024    CREATININE 0.98  "01/05/2024     Cr currently stable at baseline   Monitor BMP      VTE Pharmacologic Prophylaxis: VTE Score: 4 Moderate Risk (Score 3-4) - Pharmacological DVT Prophylaxis Ordered: rivaroxaban (Xarelto).  Code Status: Level 3 - DNAR and DNI   Discussion with family: Updated  (daughter) via phone.    Anticipated Length of Stay: Patient will be admitted on an observation basis with an anticipated length of stay of less than 2 midnights secondary to acute metabolic encephalopathy, questionable suicidal ideation.    History of Present Illness   Chief Complaint: \"why am I here\"    Verito Fallon is a 84 y.o. female with a PMH of CHF, A-fib on Xarelto, PAD, hyperlipidemia, sick sinus syndrome status post PPM, CAD status post CABG, CKD who presents with 302 petition due to concerns regarding suicidal statements that were placed today while patient was visiting her  who is currently in a nursing home ([facility]). Patient is currently confused, oriented to self only. History obtained by daughter over the phone. Daughter states that her mother has been steadily declining for some time and that this is not the first time she has made suicidal statements. She says that she has been doing this for \"years\" as a manipulative tactic when she is upset, and Verito has been very upset lately due to her 's declining health and since he is now residing in a nursing home. Verito currently lives at home alone. Daughter states that she has tried getting her mom to an assisted living facility with her  but patient refuses to stay there and insists on remaining independent. Daughter is at a loss and doesn't know what to do anymore. At the time of my evaluation, patient resting in bed comfortably. She is unable to recall today's events. When asked if she has any thoughts of harming herself she states \"no, I wouldn't have the guts to do that.\"     Review of Systems   Unable to perform ROS: Dementia    "     Historical Information   Past Medical History:   Diagnosis Date    Abnormal liver enzymes 01/21/2021    Acute (reversible) ischemia of small intestine, extent unspecified (HCC) 01/14/2022    Anal fissure     Cardiac disorder     Closed fracture of fifth metacarpal bone of right hand 11/13/2023    Closed nondisplaced fracture of fifth metacarpal bone of right hand, unspecified portion of metacarpal, initial encounter 11/17/2023    Cognitive changes 12/23/2020    Edema of left upper extremity 01/22/2021    Esophageal reflux 12/15/2023    Esophageal reflux 12/15/2023    Esophagitis, reflux     Fall 10/04/2022    Hemorrhoids     Hepatic hemangioma     Last Assessed: 1/13/2015    Herpes zoster     History of colonic polyps     Hypertension     Ischemic colitis ([facility])     Lumbar herniated disc     Malignant neoplasm without specification of site ([facility])     Memory loss 05/03/2021    Multiple contusions 11/12/2023    Nephrolithiasis     L. 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    Urinary retention 01/22/2021    Uterovaginal prolapse     Grade I-II    Wound of right leg 12/22/2022     Past Surgical History:   Procedure Laterality Date    APPENDECTOMY  1947    CARDIAC PACEMAKER PLACEMENT  01/2021    CARDIAC SURGERY      CABG    CATARACT EXTRACTION Bilateral     COLONOSCOPY  2012    Fiberoptic    COLONOSCOPY      Resolved: 2006 - 2012 5 year f/u    CORONARY ANGIOPLASTY WITH STENT PLACEMENT      CORONARY ARTERY BYPASS GRAFT      Resolved: 2012    ESOPHAGOGASTRODUODENOSCOPY  2012    Diagnostic    HEMORROIDECTOMY      KNEE SURGERY      LITHOTRIPSY      Renal    MALIGNANT SKIN LESION EXCISION      Face; Resolved: 2004    MT  ESOPHAGOGASTRODUODENOSCOPY TRANSORAL DIAGNOSTIC N/A 4/13/2016    Procedure: EGD AND COLONOSCOPY;  Surgeon: Evelin Bone MD;  Location: [facility];  Service: Gastroenterology    RENAL ARTERY STENT      SKIN LESION EXCISION      Scalp    SOFT TISSUE TUMOR RESECTION      Shoulder; Resolved: 1995    SPLIT THICKNESS SKIN GRAFT Right 12/14/2023    Procedure: SKIN GRAFT SPLIT THICKNESS (STSG)  EXTREMITY; VAC application;  Surgeon: Ap Brito DO;  Location: [facility];  Service: General    THROMBOLYSIS      Postoperative Thrombolysis PTCA    TONSILLECTOMY      Resolved: 1944    WOUND DEBRIDEMENT Right 12/8/2023    Procedure: DEBRIDEMENT LOWER EXTREMITY (WASH OUT); POSSIBLE VAC APPLICATION;  Surgeon: Abhijeet Davies MD;  Location: [facility];  Service: General     Social History     Tobacco Use    Smoking status: Never    Smokeless tobacco: Never   Vaping Use    Vaping status: Never Used   Substance and Sexual Activity    Alcohol use: Yes     Alcohol/week: 1.0 standard drink of alcohol     Types: 1 Glasses of wine per week     Comment: occasionally, but no alcohol intake recently    Drug use: Never    Sexual activity: Not Currently     E-Cigarette/Vaping    E-Cigarette Use Never User      E-Cigarette/Vaping Substances    Nicotine No     THC No     CBD No     Flavoring No     Other No     Unknown No      Family History   Problem Relation Age of Onset    Heart disease Mother     Hypertension Mother     Osteoporosis Mother     Heart disease Father     Hypertension Father     No Known Problems Sister     Heart disease Brother     Diabetes Brother     No Known Problems Daughter     No Known Problems Son     No Known Problems Maternal Grandmother     No Known Problems Maternal Grandfather     No Known Problems Paternal Grandmother     No Known Problems Paternal Grandfather     Ulcerative colitis Family     Colon polyps Family     Breast cancer Neg Hx     Colon cancer Neg Hx     Ovarian cancer Neg Hx      Social  History:  Marital Status: /Civil Union   Patient Pre-hospital Living Situation: Home  Patient Pre-hospital Level of Mobility: walks  Patient Pre-hospital Diet Restrictions: none    Meds/Allergies   I have reviewed home medications with patient family member.  Prior to Admission medications    Medication Sig Start Date End Date Taking? Authorizing Provider   acetaminophen (TYLENOL) 325 mg tablet Take 2 tablets (650 mg total) by mouth every 4 (four) hours as needed for mild pain 11/13/23   Be Banks PA-C   amiodarone 200 mg tablet Take 0.5 tablets (100 mg total) by mouth daily 8/16/24   Abhijeet Fisher MD   atorvastatin (LIPITOR) 20 mg tablet Take 1 tablet (20 mg total) by mouth daily with dinner 8/16/24   Abhijeet Fisher MD   bumetanide (BUMEX) 2 mg tablet TAKE 1 TABLET BY MOUTH EVERY DAY 1/17/25   Abhijeet Fisher MD   Debrox 6.5 % otic solution ADMINISTER 5 DROPS TO THE RIGHT EAR 2 (TWO) TIMES A DAY FOR 4 DAYS  Patient not taking: Reported on 9/27/2024 4/19/24   KATHY Doty   levETIRAcetam (Keppra) 500 mg tablet Take 1 tablet (500 mg total) by mouth every 12 (twelve) hours Do not start before December 31, 2023.  Patient not taking: Reported on 3/22/2024 12/31/23   Ayo Leija DO   metoprolol succinate (TOPROL-XL) 25 mg 24 hr tablet Take 1 tablet (25 mg total) by mouth daily 8/16/24   Abhijeet Fisher MD   multivitamin (THERAGRAN) TABS Take 1 tablet by mouth daily.  Patient not taking: Reported on 1/2/2024    Historical Provider, MD   nitroglycerin (NITROSTAT) 0.4 mg SL tablet Place 1 tablet (0.4 mg total) under the tongue every 5 (five) minutes as needed for chest pain 2/23/21   Radha Hinojosa MD   polyethylene glycol (MIRALAX) 17 g packet Take 17 g by mouth daily as needed (Constipation) for up to 5 days 11/13/23 3/22/24  Be Banks PA-C   polyethylene glycol (MIRALAX) 17 g packet Take 17 g by mouth daily for 7 days 7/5/24 7/12/24  Marissa Brannon MD    prasugrel (EFFIENT) tablet 1 TABLET DAILY 12/16/24   Abhijeet Fisher MD   rivaroxaban (Xarelto) 15 mg tablet Take 1 tablet (15 mg total) by mouth daily with breakfast 8/16/24   Abhijeet Fisher MD   senna (SENOKOT) 8.6 mg Take 2 tablets (17.2 mg total) by mouth daily for 7 days Do not start before November 14, 2023. 11/14/23 3/22/24  Be Banks PA-C   sertraline (ZOLOFT) 25 mg tablet TAKE 1 TABLET BY MOUTH EVERY DAY 11/27/23   Robbie Warner DO   triamcinolone (KENALOG) 0.1 % cream Apply topically 2 (two) times a day for 7 days 9/27/24 10/4/24  Robbie Warner DO     Allergies   Allergen Reactions    Sulfa Antibiotics Anaphylaxis    Formaldehyde     Codeine Palpitations       Objective :  Temp:  [98.2 °F (36.8 °C)] 98.2 °F (36.8 °C)  HR:  [76] 76  BP: (107-148)/(52-64) 148/64  Resp:  [18] 18  SpO2:  [97 %-98 %] 98 %  O2 Device: None (Room air)    Physical Exam  Constitutional:       General: She is not in acute distress.  HENT:      Mouth/Throat:      Mouth: Mucous membranes are moist.   Eyes:      General: No scleral icterus.  Cardiovascular:      Rate and Rhythm: Normal rate and regular rhythm.      Heart sounds: Normal heart sounds.   Pulmonary:      Effort: Pulmonary effort is normal.      Breath sounds: Normal breath sounds.   Abdominal:      General: Abdomen is flat. Bowel sounds are normal.      Palpations: Abdomen is soft.      Tenderness: There is no abdominal tenderness.   Musculoskeletal:      Right lower leg: No edema.      Left lower leg: No edema.   Skin:     General: Skin is warm and dry.   Neurological:      General: No focal deficit present.      Mental Status: She is alert. She is disoriented.   Psychiatric:         Mood and Affect: Mood is depressed.          Lines/Drains:            Lab Results: I have reviewed the following results:  Results from last 7 days   Lab Units 02/04/25  1734   WBC Thousand/uL 7.38   HEMOGLOBIN g/dL 13.8   HEMATOCRIT % 41.4   PLATELETS Thousands/uL 203    SEGS PCT % 66   LYMPHO PCT % 21   MONO PCT % 8   EOS PCT % 4     Results from last 7 days   Lab Units 02/04/25  1734   SODIUM mmol/L 139   POTASSIUM mmol/L 3.8   CHLORIDE mmol/L 100   CO2 mmol/L 31   BUN mg/dL 16   CREATININE mg/dL 0.96   ANION GAP mmol/L 8   CALCIUM mg/dL 9.6   GLUCOSE RANDOM mg/dL 85         Results from last 7 days   Lab Units 02/04/25  1523   POC GLUCOSE mg/dl 72     Lab Results   Component Value Date    HGBA1C 5.5 02/05/2019           Imaging Results Review: No pertinent imaging studies reviewed.  Other Study Results Review: EKG was reviewed.     Administrative Statements   I have spent a total time of 65 minutes in caring for this patient on the day of the visit/encounter including Diagnostic results, Prognosis, Risks and benefits of tx options, Instructions for management, Patient and family education, Importance of tx compliance, Risk factor reductions, Impressions, Counseling / Coordination of care, Documenting in the medical record, Reviewing / ordering tests, medicine, procedures  , Obtaining or reviewing history  , and Communicating with other healthcare professionals .    ** Please Note: This note has been constructed using a voice recognition system. **

## 2025-02-05 NOTE — ASSESSMENT & PLAN NOTE
-Patient is high risk of delirium due to dementia, hospitalization, and metabolic imbalance, UTI  -Initiate delirium precautions  -maintain normal sleep/wake cycle  -minimize overnight interruptions, group overnight vitals/labs/nursing checks as possible  -dim lights, close blinds and turn off tv to minimize stimulation and encourage sleep environment in evenings  -ensure that pain is well controlled consider Tylenol 975mg Q8H scheduled   -monitor for fecal and urinary retention which may precipitate delirium  -encourage early mobilization and ambulation  -provide frequent reorientation and redirection  -encourage family and friends at the bedside to help calm patient if anxious  -avoid medications which may precipitate or worsen delirium such as tramadol, benzodiazepine, anticholinergics, and antihistaminics  -encourage hydration and nutrition , assist with feeding if needed  -redirect unwanted behaviors as first line, avoid physical restraints.   - Patient currently AAO x 2 and confused as to why she is in the hospital and where her  is  - UA shows signs of UTI.  - Patient receiving ceftriaxone  - Monitor for urinary retention.   - Bladder scan x 1  - Patient denies BM  - Continue bowel regimen, add miralax daily  - continue zyprexa 2.5 mg PRN

## 2025-02-05 NOTE — ASSESSMENT & PLAN NOTE
Lab Results   Component Value Date    EGFR 50 02/05/2025    EGFR 54 02/04/2025    EGFR 44 07/05/2024    CREATININE 1.01 02/05/2025    CREATININE 0.96 02/04/2025    CREATININE 1.13 07/05/2024

## 2025-02-05 NOTE — PHYSICAL THERAPY NOTE
PT EVALUATION      NAME:  Verito Fallon  AGE:   84 y.o.  Mrn:   [id number]  Length Of Stay: 0    ADMIT DX:  AMS (altered mental status) [R41.82]  Weakness [R53.1]    Past Medical History:   Diagnosis Date    Abnormal liver enzymes 01/21/2021    Acute (reversible) ischemia of small intestine, extent unspecified (HCC) 01/14/2022    Anal fissure     Cardiac disorder     Closed fracture of fifth metacarpal bone of right hand 11/13/2023    Closed nondisplaced fracture of fifth metacarpal bone of right hand, unspecified portion of metacarpal, initial encounter 11/17/2023    Cognitive changes 12/23/2020    Edema of left upper extremity 01/22/2021    Esophageal reflux 12/15/2023    Esophageal reflux 12/15/2023    Esophagitis, reflux     Fall 10/04/2022    Hemorrhoids     Hepatic hemangioma     Last Assessed: 1/13/2015    Herpes zoster     History of colonic polyps     Hypertension     Ischemic colitis (HCC)     Lumbar herniated disc     Malignant neoplasm without specification of site (HCC)     Memory loss 05/03/2021    Multiple contusions 11/12/2023    Nephrolithiasis     L. 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    Urinary retention 01/22/2021    Uterovaginal prolapse     Grade I-II    Wound of right leg 12/22/2022     Past Surgical History:   Procedure Laterality Date    APPENDECTOMY  1947    CARDIAC PACEMAKER PLACEMENT  01/2021    CARDIAC SURGERY      CABG    CATARACT EXTRACTION Bilateral     COLONOSCOPY  2012    Fiberoptic    COLONOSCOPY      Resolved: 2006 - 2012 5 year f/u    CORONARY ANGIOPLASTY WITH STENT PLACEMENT      CORONARY ARTERY BYPASS GRAFT      Resolved: 2012    ESOPHAGOGASTRODUODENOSCOPY  2012     Diagnostic    HEMORROIDECTOMY      KNEE SURGERY      LITHOTRIPSY      Renal    MALIGNANT SKIN LESION EXCISION      Face; Resolved: 2004    IN ESOPHAGOGASTRODUODENOSCOPY TRANSORAL DIAGNOSTIC N/A 4/13/2016    Procedure: EGD AND COLONOSCOPY;  Surgeon: Evelin Bone MD;  [facility]: [facility];  Service: Gastroenterology    RENAL ARTERY STENT      SKIN LESION EXCISION      Scalp    SOFT TISSUE TUMOR RESECTION      Shoulder; Resolved: 1995    SPLIT THICKNESS SKIN GRAFT Right 12/14/2023    Procedure: SKIN GRAFT SPLIT THICKNESS (STSG)  EXTREMITY; VAC application;  Surgeon: Ap Brito DO;  [facility];  Service: General    THROMBOLYSIS      Postoperative Thrombolysis PTCA    TONSILLECTOMY      Resolved: 1944    WOUND DEBRIDEMENT Right 12/8/2023    Procedure: DEBRIDEMENT LOWER EXTREMITY (WASH OUT); POSSIBLE VAC APPLICATION;  Surgeon: Abhijeet Davies MD;  [facility];  Service: General        02/05/25 0851   PT Last Visit   PT Visit Date 02/05/25   Note Type   Note type Evaluation   Pain Assessment   Pain Assessment Tool 0-10   Pain Score No Pain   Restrictions/Precautions   Other Precautions Fall Risk;Bed Alarm;Chair Alarm;Cognitive;Multiple lines  (Patient seen in the ED)   Home Living   Type of Home House   Home Layout One level;Able to live on main level with bedroom/bathroom;Performs ADLs on one level;Stairs to enter with rails  (3 steps to enter)   Bathroom Shower/Tub Walk-in shower   Bathroom Toilet Standard   Bathroom Equipment Grab bars in shower;Commode;Built-in shower seat   Bathroom Accessibility Accessible   Home Equipment Cane;Walker   Additional Comments Patient is a questionable historian   Prior Function   Level of [place] Independent with ADLs;Independent with functional mobility;Independent with IADLS   Lives With (S)  Alone  (Pt's spouse is at [facility])   Receives Help From Family   IADLs Independent with driving;Independent with meal prep;Independent with medication  "management  (Patient's spouse assisted patient with IADLs prior to his admission to [facility])   Falls in the last 6 months 0  (Denies)   Vocational Retired   Comments Patient is ambulatory without an assistive device prior to admission   General   Additional Pertinent History Patient is admitted secondary to suicidal statement while visiting her spouse at his [facility].  At time of PT evaluation patient was pending a psychology and gerontology consult.   Family/Caregiver Present No   Cognition   Overall Cognitive Status Impaired   Arousal/Participation Cooperative   Orientation Level Oriented to person;Disoriented to place;Disoriented to time;Disoriented to situation   Memory Decreased recall of precautions;Decreased recall of recent events;Decreased short term memory   Following Commands Follows one step commands with increased time or repetition   Comments At least 2 patient identifiers including name and date of birth   Subjective   Subjective Patient repeatedly asking \"why am I here?\"   RLE Assessment   RLE Assessment WFL  (Grossly 3-3+/5)   LLE Assessment   LLE Assessment WFL  (Grossly 3-3+/5)   Vision-Basic Assessment   Current Vision   (Has glasses, states \"I am supposed to wear them all the time\" but wears them as needed)   Light Touch   RLE Light Touch Grossly intact   LLE Light Touch Grossly intact   Bed Mobility   Supine to Sit 4  Minimal assistance   Additional items Assist x 1;Verbal cues;Increased time required;LE management  (Trunk management)   Sit to Supine 4  Minimal assistance   Additional items Assist x 1;Verbal cues;Increased time required;LE management  (Trunk management)   Transfers   Sit to Stand 4  Minimal assistance   Additional items Assist x 1;Verbal cues;Increased time required   Stand to Sit 4  Minimal assistance   Additional items Assist x 1;Verbal cues;Increased time required   Ambulation/Elevation   Gait pattern Short stride;Step through pattern;Decreased foot clearance;Decreased heel " strike;Decreased toe off;Knees flexed  (Patient ambulates with trunk, hip and knee flexion)   Gait Assistance 4  Minimal assist   Additional items Assist x 1;Verbal cues;Tactile cues   Assistive Device None  (Minimal handhold assist)   Distance 50 feet with change in direction; patient reaching to hold walls, furniture and/or therapist hand   Balance   Static Sitting Fair   Static Standing Fair -   Ambulatory Poor +   Endurance Deficit   Endurance Deficit Yes   Endurance Deficit Description Limited overall activity, standing and gait endurance   Activity Tolerance   Activity Tolerance Patient limited by fatigue;Treatment limited secondary to medical complications (Comment)  (Generalized weakness; unsteady gait; impaired cognition)   Medical Staff Made Aware OT;CM   Nurse Made Aware OMER Sharma   Assessment   Problem List Decreased strength;Decreased endurance;Impaired balance;Decreased mobility;Decreased coordination;Decreased cognition;Impaired judgement;Decreased safety awareness   Assessment Patient seen for Physical Therapy evaluation. Patient admitted with Metabolic encephalopathy.  Comorbidities affecting patient's physical performance include: CHF, A-fib, PAD, HLD, SSS, CAD, CABG, cognitive impairment, osteoporosis, seizure, pacemaker.  Personal factors affecting patient at time of initial evaluation include: lives in one story house, stairs to enter home, inability to navigate community distances, inability to navigate level surfaces without external assistance, inability to perform dynamic tasks in community, decreased cognition, and limited insight into impairments. Prior to admission, patient was independent with functional mobility without assistive device, independent with ADLS, independent with IADLS, living alone in one story home with 3 steps to enter, and ambulating household distance.  Please find objective findings from Physical Therapy assessment regarding body systems outlined above with  impairments and limitations including weakness, impaired balance, decreased endurance, impaired coordination, gait deviations, decreased activity tolerance, decreased functional mobility tolerance, decreased safety awareness, impaired judgement, orthopedic restrictions, and decreased cognition.  The Barthel Index was used as a functional outcome tool presenting with a score of Barthel Index Score: 50 today indicating marked limitations of functional mobility and ADLS.  Patient's clinical presentation is currently unstable/unpredictable as seen in patient's presentation of vital sign response, varying levels of cognitive performance, increased fall risk, new onset of impairment of functional mobility, decreased endurance, and new onset of weakness. Pt would benefit from continued Physical Therapy treatment to address deficits as defined above and maximize level of functional mobility. As demonstrated by objective findings, the assigned level of complexity for this evaluation is high.    The patient's -Located within [facility] Basic Mobility Inpatient Short Form Raw Score is 17. A Raw score of greater than 16 suggests the patient may benefit from discharge to home. Please also refer to the recommendation of the Physical Therapist for safe discharge planning.   Barriers to Discharge Inaccessible home environment;Decreased caregiver support   Barriers to Discharge Comments Patient lives alone; 3 steps to enter; marked cognitive impairment; at risk for falls and subsequent readmission   Goals   Patient Goals To go home   STG Expiration Date 02/15/25   Short Term Goal #1 Patient will: Increase bilateral LE strength 1/2 grade to facilitate independent mobility, Perform all bed mobility tasks independently to improve pt's independence w/ repositioning for decrease risk of skin breakdown, Perform all transfers independently consistently from various height surfaces in order to improve I w/ engagement w/ real-world environments/situations,  Ambulate at least 500+ ft.  With LRAD versus no AD independently w/o LOB to facilitate return and engagement w/ previous living environment, Navigate 3 stairs independently with unilateral handrail to either improve independence w/ entering home and/or so patient can fully access living areas in home, Increase all balance 1 grade to decrease risk for falls, Tolerate at least 30 consecutive minutes of activity to demonstrate improved activity tolerance and endurance, and Tolerate 3 hr OOB to faciliate upright tolerance   Plan   Treatment/Interventions ADL retraining;Functional transfer training;LE strengthening/ROM;Elevations;Therapeutic exercise;Endurance training;Cognitive reorientation;Patient/family training;Equipment eval/education;Bed mobility;Gait training;Compensatory technique education;Spoke to nursing;Spoke to case management;OT   PT Frequency 3-5x/wk   Discharge Recommendation   Rehab Resource Intensity Level, PT II (Moderate Resource Intensity)   Additional Comments Patient is recommended for Level II Moderate Resource Intensity.  Patient lives alone and needs minimal assist for safe gait and functional mobility.  Patient also with marked impaired cognition.  Patient is not safe to be discharged home alone.   AM-PAC Basic Mobility Inpatient   Turning in Flat Bed Without Bedrails 3   Lying on Back to Sitting on Edge of Flat Bed Without Bedrails 3   Moving Bed to Chair 3   Standing Up From Chair Using Arms 3   Walk in Room 3   Climb 3-5 Stairs With Railing 2   Basic Mobility Inpatient Raw Score 17   Basic Mobility Standardized Score 39.67   [facility] Highest Level Of Mobility   -HLM Goal 5: Stand one or more mins   -HLM Achieved 7: Walk 25 feet or more   Barthel Index   Feeding 5   Bathing 0   Grooming Score 0   Dressing Score 5   Bladder Score 10   Bowels Score 10   Toilet Use Score 5   Transfers (Bed/Chair) Score 10   Mobility (Level Surface) Score 0   Stairs Score 5   Barthel Index Score 50   End  of Consult   Patient Position at End of Consult Supine;All needs within reach  (1:1)   Licensure   [place] License Number  Summer L NANCY Garay   Portions of the documentation may have been created using voice recognition software. Occasional wrong word or sound alike substitutions may have occurred due to the inherent limitation of the voice recognition software. Read the chart carefully and recognize, using context, where substitutions have occurred.

## 2025-02-05 NOTE — ASSESSMENT & PLAN NOTE
Lab Results   Component Value Date    EGFR 54 02/04/2025    EGFR 44 07/05/2024    EGFR 53 01/05/2024    CREATININE 0.96 02/04/2025    CREATININE 1.13 07/05/2024    CREATININE 0.98 01/05/2024     Cr currently stable at baseline   Monitor BMP

## 2025-02-06 LAB
ANION GAP SERPL CALCULATED.3IONS-SCNC: 7 MMOL/L (ref 4–13)
BASOPHILS # BLD AUTO: 0.04 THOUSANDS/ΜL (ref 0–0.1)
BASOPHILS NFR BLD AUTO: 1 % (ref 0–1)
BUN SERPL-MCNC: 17 MG/DL (ref 5–25)
CALCIUM SERPL-MCNC: 9.4 MG/DL (ref 8.4–10.2)
CHLORIDE SERPL-SCNC: 103 MMOL/L (ref 96–108)
CO2 SERPL-SCNC: 31 MMOL/L (ref 21–32)
CREAT SERPL-MCNC: 0.98 MG/DL (ref 0.6–1.3)
EOSINOPHIL # BLD AUTO: 0.37 THOUSAND/ΜL (ref 0–0.61)
EOSINOPHIL NFR BLD AUTO: 5 % (ref 0–6)
ERYTHROCYTE [DISTWIDTH] IN BLOOD BY AUTOMATED COUNT: 14 % (ref 11.6–15.1)
GFR SERPL CREATININE-BSD FRML MDRD: 52 ML/MIN/1.73SQ M
GLUCOSE SERPL-MCNC: 92 MG/DL (ref 65–140)
HCT VFR BLD AUTO: 41.1 % (ref 34.8–46.1)
HGB BLD-MCNC: 13.5 G/DL (ref 11.5–15.4)
IMM GRANULOCYTES # BLD AUTO: 0.02 THOUSAND/UL (ref 0–0.2)
IMM GRANULOCYTES NFR BLD AUTO: 0 % (ref 0–2)
LYMPHOCYTES # BLD AUTO: 1.13 THOUSANDS/ΜL (ref 0.6–4.47)
LYMPHOCYTES NFR BLD AUTO: 16 % (ref 14–44)
MCH RBC QN AUTO: 30.1 PG (ref 26.8–34.3)
MCHC RBC AUTO-ENTMCNC: 32.8 G/DL (ref 31.4–37.4)
MCV RBC AUTO: 92 FL (ref 82–98)
MONOCYTES # BLD AUTO: 0.55 THOUSAND/ΜL (ref 0.17–1.22)
MONOCYTES NFR BLD AUTO: 8 % (ref 4–12)
NEUTROPHILS # BLD AUTO: 4.81 THOUSANDS/ΜL (ref 1.85–7.62)
NEUTS SEG NFR BLD AUTO: 70 % (ref 43–75)
NRBC BLD AUTO-RTO: 0 /100 WBCS
PLATELET # BLD AUTO: 193 THOUSANDS/UL (ref 149–390)
PMV BLD AUTO: 10.8 FL (ref 8.9–12.7)
POTASSIUM SERPL-SCNC: 3.9 MMOL/L (ref 3.5–5.3)
RBC # BLD AUTO: 4.48 MILLION/UL (ref 3.81–5.12)
SODIUM SERPL-SCNC: 141 MMOL/L (ref 135–147)
WBC # BLD AUTO: 6.92 THOUSAND/UL (ref 4.31–10.16)

## 2025-02-06 PROCEDURE — 99232 SBSQ HOSP IP/OBS MODERATE 35: CPT

## 2025-02-06 PROCEDURE — 85025 COMPLETE CBC W/AUTO DIFF WBC: CPT

## 2025-02-06 PROCEDURE — 99232 SBSQ HOSP IP/OBS MODERATE 35: CPT | Performed by: FAMILY MEDICINE

## 2025-02-06 PROCEDURE — 80048 BASIC METABOLIC PNL TOTAL CA: CPT

## 2025-02-06 RX ORDER — POLYETHYLENE GLYCOL 3350 17 G/17G
17 POWDER, FOR SOLUTION ORAL DAILY
Status: DISCONTINUED | OUTPATIENT
Start: 2025-02-06 | End: 2025-02-21 | Stop reason: HOSPADM

## 2025-02-06 RX ORDER — ESCITALOPRAM OXALATE 10 MG/1
5 TABLET ORAL DAILY
Status: DISCONTINUED | OUTPATIENT
Start: 2025-02-07 | End: 2025-02-21 | Stop reason: HOSPADM

## 2025-02-06 RX ADMIN — SERTRALINE HYDROCHLORIDE 25 MG: 25 TABLET ORAL at 09:21

## 2025-02-06 RX ADMIN — SENNOSIDES 17.2 MG: 8.6 TABLET ORAL at 17:07

## 2025-02-06 RX ADMIN — ACETAMINOPHEN 975 MG: 325 TABLET, FILM COATED ORAL at 22:25

## 2025-02-06 RX ADMIN — ATORVASTATIN CALCIUM 20 MG: 20 TABLET, FILM COATED ORAL at 17:07

## 2025-02-06 RX ADMIN — ACETAMINOPHEN 975 MG: 325 TABLET, FILM COATED ORAL at 17:07

## 2025-02-06 RX ADMIN — ACETAMINOPHEN 975 MG: 325 TABLET, FILM COATED ORAL at 09:20

## 2025-02-06 RX ADMIN — RIVAROXABAN 15 MG: 15 TABLET, FILM COATED ORAL at 09:21

## 2025-02-06 RX ADMIN — SENNOSIDES 17.2 MG: 8.6 TABLET ORAL at 09:21

## 2025-02-06 RX ADMIN — BUMETANIDE 2 MG: 1 TABLET ORAL at 09:20

## 2025-02-06 RX ADMIN — PRASUGREL 5 MG: 10 TABLET, FILM COATED ORAL at 09:22

## 2025-02-06 RX ADMIN — POLYETHYLENE GLYCOL 3350 17 G: 17 POWDER, FOR SOLUTION ORAL at 14:17

## 2025-02-06 RX ADMIN — CEFTRIAXONE SODIUM 1000 MG: 10 INJECTION, POWDER, FOR SOLUTION INTRAVENOUS at 22:25

## 2025-02-06 RX ADMIN — AMIODARONE HYDROCHLORIDE 100 MG: 200 TABLET ORAL at 09:21

## 2025-02-06 NOTE — PROGRESS NOTES
Progress Note - Hospitalist   Name: Verito Fallon 84 y.o. female I MRN: [id number]  Unit/Bed#: S -01 I Date of Admission: 2/4/2025   Date of Service: 2/6/2025 I Hospital Day: 1    Assessment & Plan  Metabolic encephalopathy  Patient with history of cognitive impairment presents to the ER on 302 due to concern regarding suicidal statements while she was visiting her  in the nursing home today. 302 not upheld by the ER provider as patient not currently expressing suicidal intent.   Patient's daughter states that Verito's cognition has been steadily declining for some time and has been recently exacerbated by her  being sick and placed in a nursing home. Prior to this, her  was primary caretaker. Patient has expressed suicidal intent in the past according to daughter however this has been used as a manipulative tactic.   Patient is currently oriented to self only. She currently denies SI/HI.   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)  Wt Readings from Last 3 Encounters:   02/06/25 52.3 kg (115 lb 4.8 oz)   09/27/24 53 kg (116 lb 12.8 oz)   08/16/24 55.4 kg (122 lb 1.6 oz)       Appears euvolemic   Continue PO bumex 2mg daily   I/O, Daily weights  Stable      A-fib (HCC)  Rate controlled   Continue Xarelto, amiodarone, Toprol XL   PAD (peripheral artery disease) (HCC)  Continue Prasugrel, statin   Hyperlipidemia  Continue statin   Sick sinus syndrome (HCC)  S/p PPM   CAD (coronary  artery disease)  S/p CABG   Continue Prasugrel, Xarelto, BB, statin   Chronic kidney disease, stage 3b (HCC)  Lab Results   Component Value Date    EGFR 52 02/06/2025    EGFR 50 02/05/2025    EGFR 54 02/04/2025    CREATININE 0.98 02/06/2025    CREATININE 1.01 02/05/2025    CREATININE 0.96 02/04/2025     Cr currently stable at baseline   Monitor BMP  UTI (urinary tract infection)  UA indicating UTI  Urine culture showing gram-negative rods, await for finalization  Started on IV ceftriaxone, will continue  Likely contributing to confusion  Dementia (HCC)  Alert and oriented  x to self and location, however not to time or situation  High risk for delirium  Continue with delirium precautions  Encouraged avoidance of overstimulation/under stimulation of sensory organs including appropriate wake/sleep cycle, avoiding unnecessary sounds including call bells and additional monitors, ambient light, and appropriate room lighting/television usage for time of day.  Encourage use of assistive devices such as corrective lenses at all appropriate times to reduce risk of uncorrected sensory impairment from converting to isolation, confusion, encephalopathy and more precipitous cognitive decline     VTE Pharmacologic Prophylaxis: VTE Score: 4 Moderate Risk (Score 3-4) - Pharmacological DVT Prophylaxis Ordered: rivaroxaban (Xarelto).    Mobility:   Basic Mobility Inpatient Raw Score: 17  JH-HLM Goal: 5: Stand one or more mins  JH-HLM Achieved: 2: Bed activities/Dependent transfer  JH-HLM Goal NOT achieved. Continue with multidisciplinary rounding and encourage appropriate mobility to improve upon JH-HLM goals.    Patient Centered Rounds: I performed bedside rounds with nursing staff today.   Discussions with Specialists or Other Care Team Provider: CM    Education and Discussions with Family / Patient: Updated  (daughter) via phone.    Current Length of Stay: 1 day(s)  Current Patient Status: Inpatient   Certification  Statement: The patient will continue to require additional inpatient hospital stay due to pending safe dispo and improvement for metabolic encephalopathy  Discharge Plan: Anticipate discharge in 24-48 hrs to rehab facility.    Code Status: Level 3 - DNAR and DNI    Subjective   Patient is a relatively poor historian although offers no complaints at this time.  Currently denies any chest pain/pressure, palpitations, lightness, nausea, shortness breath, or chills.    Objective :  Temp:  [97.6 °F (36.4 °C)-97.9 °F (36.6 °C)] 97.7 °F (36.5 °C)  HR:  [70-75] 70  BP: (107-123)/(53-68) 109/53  Resp:  [16-18] 16  SpO2:  [93 %-99 %] 99 %  O2 Device: None (Room air)    Body mass index is 23.29 kg/m².     Input and Output Summary (last 24 hours):     Intake/Output Summary (Last 24 hours) at 2/6/2025 1331  Last data filed at 2/6/2025 1300  Gross per 24 hour   Intake 200 ml   Output 150 ml   Net 50 ml       Physical Exam  Vitals and nursing note reviewed.   Constitutional:       General: She is not in acute distress.     Appearance: She is normal weight. She is not ill-appearing, toxic-appearing or diaphoretic.   HENT:      Head: Normocephalic.      Nose: Nose normal.      Mouth/Throat:      Mouth: Mucous membranes are moist.      Pharynx: Oropharynx is clear.   Eyes:      General: No scleral icterus.     Conjunctiva/sclera: Conjunctivae normal.      Pupils: Pupils are equal, round, and reactive to light.   Cardiovascular:      Rate and Rhythm: Normal rate and regular rhythm.      Heart sounds: No murmur heard.     No friction rub. No gallop.   Pulmonary:      Effort: Pulmonary effort is normal. No respiratory distress.      Breath sounds: Normal breath sounds. No stridor. No wheezing, rhonchi or rales.   Abdominal:      General: Abdomen is flat.      Palpations: Abdomen is soft.   Musculoskeletal:         General: Normal range of motion.      Cervical back: Normal range of motion and neck supple.      Right lower leg: No edema.       Left lower leg: No edema.   Lymphadenopathy:      Cervical: No cervical adenopathy.   Skin:     General: Skin is warm.      Coloration: Skin is not jaundiced or pale.      Findings: No bruising, erythema or lesion.   Neurological:      Mental Status: She is alert.      Cranial Nerves: No cranial nerve deficit.      Motor: No weakness.      Comments: Patient alert and oriented to self and location however not situation or time.  Patient also unable to recall long-term events including  family members   Psychiatric:         Mood and Affect: Mood normal.         Behavior: Behavior normal.         Thought Content: Thought content normal.               Lab Results: I have reviewed the following results:   Results from last 7 days   Lab Units 25  0912   WBC Thousand/uL 6.92   HEMOGLOBIN g/dL 13.5   HEMATOCRIT % 41.1   PLATELETS Thousands/uL 193   SEGS PCT % 70   LYMPHO PCT % 16   MONO PCT % 8   EOS PCT % 5     Results from last 7 days   Lab Units 25  0912   SODIUM mmol/L 141   POTASSIUM mmol/L 3.9   CHLORIDE mmol/L 103   CO2 mmol/L 31   BUN mg/dL 17   CREATININE mg/dL 0.98   ANION GAP mmol/L 7   CALCIUM mg/dL 9.4   GLUCOSE RANDOM mg/dL 92         Results from last 7 days   Lab Units 25  1523   POC GLUCOSE mg/dl 72               Recent Cultures (last 7 days):   Results from last 7 days   Lab Units 25  1848   URINE CULTURE  >100,000 cfu/ml Non lactose fermenting gram negative phong*       Imaging Results Review: No pertinent imaging studies reviewed.  Other Study Results Review: No additional pertinent studies reviewed.    Last 24 Hours Medication List:     Current Facility-Administered Medications:     acetaminophen (TYLENOL) tablet 650 mg, Q6H PRN    acetaminophen (TYLENOL) tablet 975 mg, TID    amiodarone tablet 100 mg, Daily    atorvastatin (LIPITOR) tablet 20 mg, Daily With Dinner    bumetanide (BUMEX) tablet 2 mg, Daily    ceftriaxone (ROCEPHIN) 1 g/50 mL in dextrose IVPB, Q24H, Last  Rate: 1,000 mg (02/05/25 2131)    melatonin tablet 3 mg, HS    metoprolol succinate (TOPROL-XL) 24 hr tablet 25 mg, Daily    OLANZapine (ZyPREXA) IM injection 2.5 mg, Q8H PRN    OLANZapine (ZyPREXA) IM injection 5 mg, Once    prasugrel (EFFIENT) tablet 5 mg, Daily    rivaroxaban (XARELTO) tablet 15 mg, Daily With Breakfast    senna (SENOKOT) tablet 17.2 mg, BID    sertraline (ZOLOFT) tablet 25 mg, Daily    Administrative Statements   Today, Patient Was Seen By: Danny Church PA-C  I have spent a total time of 35 minutes in caring for this patient on the day of the visit/encounter including Documenting in the medical record, Reviewing / ordering tests, medicine, procedures  , Obtaining or reviewing history  , and Communicating with other healthcare professionals .    **Please Note: This note may have been constructed using a voice recognition system.**

## 2025-02-06 NOTE — PLAN OF CARE
Problem: Potential for Falls  Goal: Patient will remain free of falls  Description: INTERVENTIONS:  - Educate patient/family on patient safety including physical limitations  - Instruct patient to call for assistance with activity   - Consult OT/PT to assist with strengthening/mobility   - Keep Call bell within reach  - Keep bed low and locked with side rails adjusted as appropriate  - Keep care items and personal belongings within reach  - Initiate and maintain comfort rounds  - Make Fall Risk Sign visible to staff  - Offer Toileting every  Hours, in advance of need  - Initiate/Maintain alarm  - Obtain necessary fall risk management equipment  - Apply yellow socks and bracelet for high fall risk patients  - Consider moving patient to room near nurses station  2/6/2025 1136 by Kaity Carranza RN  Outcome: Progressing  2/6/2025 1136 by Kaity Carranza RN  Outcome: Progressing     Problem: NEUROSENSORY - ADULT  Goal: Achieves stable or improved neurological status  Description: INTERVENTIONS  - Monitor and report changes in neurological status  - Monitor vital signs such as temperature, blood pressure, glucose, and any other labs ordered   - Initiate measures to prevent increased intracranial pressure  - Monitor for seizure activity and implement precautions if appropriate      Outcome: Progressing  Goal: Remains free of injury related to seizures activity  Description: INTERVENTIONS  - Maintain airway, patient safety  and administer oxygen as ordered  - Monitor patient for seizure activity, document and report duration and description of seizure to physician/advanced practitioner  - If seizure occurs,  ensure patient safety during seizure  - Reorient patient post seizure  - Seizure pads on all 4 side rails  - Instruct patient/family to notify RN of any seizure activity including if an aura is experienced  - Instruct patient/family to call for assistance with activity based on nursing assessment  - Administer  anti-seizure medications if ordered    Outcome: Progressing  Goal: Achieves maximal functionality and self care  Description: INTERVENTIONS  - Monitor swallowing and airway patency with patient fatigue and changes in neurological status  - Encourage and assist patient to increase activity and self care.   - Encourage visually impaired, hearing impaired and aphasic patients to use assistive/communication devices  Outcome: Progressing

## 2025-02-06 NOTE — ASSESSMENT & PLAN NOTE
Wt Readings from Last 3 Encounters:   02/06/25 52.3 kg (115 lb 4.8 oz)   09/27/24 53 kg (116 lb 12.8 oz)   08/16/24 55.4 kg (122 lb 1.6 oz)       Appears euvolemic   Continue PO bumex 2mg daily   I/O, Daily weights  Stable

## 2025-02-06 NOTE — ASSESSMENT & PLAN NOTE
Lab Results   Component Value Date    EGFR 52 02/06/2025    EGFR 50 02/05/2025    EGFR 54 02/04/2025    CREATININE 0.98 02/06/2025    CREATININE 1.01 02/05/2025    CREATININE 0.96 02/04/2025     Cr currently stable at baseline   Monitor BMP

## 2025-02-06 NOTE — PROGRESS NOTES
Progress Note - Geriatric Medicine   Name: Verito Fallon 84 y.o. female I MRN: [id number]  Unit/Bed#: S -01 I Date of Admission: 2/4/2025   Date of Service: 2/6/2025 I Hospital Day: 1     Assessment & Plan  Metabolic encephalopathy  -Patient is high risk of delirium due to dementia, hospitalization, and metabolic imbalance, UTI  -Initiate delirium precautions  -maintain normal sleep/wake cycle  -minimize overnight interruptions, group overnight vitals/labs/nursing checks as possible  -dim lights, close blinds and turn off tv to minimize stimulation and encourage sleep environment in evenings  -ensure that pain is well controlled consider Tylenol 975mg Q8H scheduled   -monitor for fecal and urinary retention which may precipitate delirium  -encourage early mobilization and ambulation  -provide frequent reorientation and redirection  -encourage family and friends at the bedside to help calm patient if anxious  -avoid medications which may precipitate or worsen delirium such as tramadol, benzodiazepine, anticholinergics, and antihistaminics  -encourage hydration and nutrition , assist with feeding if needed  -redirect unwanted behaviors as first line, avoid physical restraints.   - Patient currently AAO x 2 and confused as to why she is in the hospital and where her  is  - UA shows signs of UTI.  - Patient receiving ceftriaxone  - Monitor for urinary retention.   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 She has not refilled the prescription  - Continue statin  - Management per primary team  UTI (urinary tract infection)  Continue ceftriaxone  Monitor for urinary retention  Sick sinus syndrome (HCC)    CAD (coronary artery disease)    Dementia ([facility])  - In 2021 patient scored 18/30 on MoCA  - CT head shows moderate microangiopathic changes  - Mini Cog 3/5 on exam today  Pt. Is AAOx 1-2 at baseline   Currently AAOx2  Pt. Lives at home by herself.   Independent for instrumental ADLs. She used to have a home health aide, but recently fired her.  Independent for ADLs  No behaviors  TSH 1.531  Recommend checking B12  [place] to person, place, time, and situation as needed  Monitor for behaviors   Monitor for mental status change  Provide supportive care   - Switch sertraline to lexapro 5 mg  - Recommend supervision with medication management  Chronic kidney disease, stage 3b ([facility])  Lab Results   Component Value Date    EGFR 52 02/06/2025    EGFR 50 02/05/2025    EGFR 54 02/04/2025    CREATININE 0.98 02/06/2025    CREATININE 1.01 02/05/2025    CREATININE 0.96 02/04/2025     Creatinine stable.  Will avoid nephrotoxic medication.  Encourage po hydration.  Will monitor BMP.     Subjective:   84 year-old female with history of dementia and metabolic encephalopathy presented sitting up comfortably in bed. Patient was somnolent on exam. She stated she thinks she slept well last night, but she can't remember. AAO x 2. Patient states she is very confused as to why she is here and where her  is. She wishes to be with her . Patient denies having a bowel movement. She denies any urinary symptoms including frequency, urgency, and dysuria. Patient denies being in any pain. Patient reports eating minimal for breakfast due to being tired.     Per nurse: Patient did eat breakfast and has been sleepy this morning. She is unsure why the patient received Zyprexa yesterday evening. Reports no  agitation or behaviors this morning. The patient did try to get out of bed this morning, but was easily redirected. Reports no bowel movement.    Per chart review: Patient was evaluated by Neuropsych and it was their impression that the patient does not appear to have capacity to make fully informed medical decision and revealed diffuse cognitive dysfunction.    Review of Systems   Unable to perform ROS: Mental status change   Constitutional:  Negative for chills.   HENT:  Negative for sore throat.    Respiratory:  Negative for cough and shortness of breath.    Cardiovascular:  Negative for chest pain.   Gastrointestinal:  Negative for abdominal pain and constipation.   Genitourinary:  Negative for dysuria, frequency and urgency.   Musculoskeletal:  Negative for arthralgias and back pain.   Neurological:  Negative for headaches.   Psychiatric/Behavioral:  Positive for confusion.          Objective:     Vitals: Blood pressure 109/53, pulse 70, temperature 97.7 °F (36.5 °C), resp. rate 16, weight 52.3 kg (115 lb 4.8 oz), SpO2 99%, not currently breastfeeding.,Body mass index is 23.29 kg/m².      Intake/Output Summary (Last 24 hours) at 2/6/2025 1055  Last data filed at 2/6/2025 0700  Gross per 24 hour   Intake --   Output 150 ml   Net -150 ml       Current Medications: Reviewed    Physical Exam:   Physical Exam  Vitals and nursing note reviewed.   Constitutional:       General: She is not in acute distress.     Appearance: Normal appearance. She is well-developed.   HENT:      Head: Normocephalic and atraumatic.      Right Ear: External ear normal.      Left Ear: External ear normal.      Mouth/Throat:      Mouth: Mucous membranes are dry.      Pharynx: Oropharynx is clear.   Eyes:      Conjunctiva/sclera: Conjunctivae normal.   Cardiovascular:      Rate and Rhythm: Normal rate and regular rhythm.      Pulses: Normal pulses.      Heart sounds: Normal heart sounds. No murmur heard.  Pulmonary:      Effort: Pulmonary  effort is normal. No respiratory distress.      Breath sounds: Normal breath sounds.   Abdominal:      General: Bowel sounds are normal.      Palpations: Abdomen is soft.      Tenderness: There is no abdominal tenderness.   Musculoskeletal:         General: No swelling.      Cervical back: Neck supple.   Skin:     General: Skin is warm and dry.      Capillary Refill: Capillary refill takes less than 2 seconds.      Findings: Bruising (over dorsal aspects of both forearms) present.   Neurological:      Mental Status: She is alert. Mental status is at baseline.      Comments: AAO x 2   Psychiatric:         Mood and Affect: Mood normal.         Cognition and Memory: Cognition is impaired. Memory is impaired.          Invasive Devices       Peripheral Intravenous Line  Duration             Peripheral IV 02/04/25 Right Forearm 1 day                    Lab, Imaging and other studies: Results Review Statement: I reviewed radiology reports from this admission including: CT head.

## 2025-02-06 NOTE — ASSESSMENT & PLAN NOTE
Alert and oriented  x to self and location, however not to time or situation  High risk for delirium  Continue with delirium precautions  Encouraged avoidance of overstimulation/under stimulation of sensory organs including appropriate wake/sleep cycle, avoiding unnecessary sounds including call bells and additional monitors, ambient light, and appropriate room lighting/television usage for time of day.  Encourage use of assistive devices such as corrective lenses at all appropriate times to reduce risk of uncorrected sensory impairment from converting to isolation, confusion, encephalopathy and more precipitous cognitive decline

## 2025-02-06 NOTE — ASSESSMENT & PLAN NOTE
UA indicating UTI  Urine culture showing gram-negative rods, await for finalization  Started on IV ceftriaxone, will continue  Likely contributing to confusion

## 2025-02-06 NOTE — ASSESSMENT & PLAN NOTE
Patient with history of cognitive impairment presents to the ER on 302 due to concern regarding suicidal statements while she was visiting her  in the nursing home today. 302 not upheld by the ER provider as patient not currently expressing suicidal intent.   Patient's daughter states that Verito's cognition has been steadily declining for some time and has been recently exacerbated by her  being sick and placed in a nursing home. Prior to this, her  was primary caretaker. Patient has expressed suicidal intent in the past according to daughter however this has been used as a manipulative tactic.   Patient is currently oriented to self only. She currently denies SI/HI.   UA appears to be infected which may be exacerbating her confusion, urine culture shows gram-negative rods, awaiting for finalization  IV Ceftriaxone for suspected UTI   Delirium precautions   Continual virtual observation, to begin to wean off the next 24 hours  Psychiatry consulted and ultimately had no further inpatient recommendations  Geriatrics consult appreciated   PT/OT-level 2 recommendations with AM-PAC score 17  Neuropsych consulted and currently determined patient has no capacity

## 2025-02-07 PROBLEM — K59.00 CONSTIPATION: Status: ACTIVE | Noted: 2025-02-07

## 2025-02-07 LAB
ANION GAP SERPL CALCULATED.3IONS-SCNC: 5 MMOL/L (ref 4–13)
BACTERIA UR CULT: ABNORMAL
BUN SERPL-MCNC: 16 MG/DL (ref 5–25)
CALCIUM SERPL-MCNC: 8.8 MG/DL (ref 8.4–10.2)
CHLORIDE SERPL-SCNC: 104 MMOL/L (ref 96–108)
CO2 SERPL-SCNC: 31 MMOL/L (ref 21–32)
CREAT SERPL-MCNC: 0.96 MG/DL (ref 0.6–1.3)
ERYTHROCYTE [DISTWIDTH] IN BLOOD BY AUTOMATED COUNT: 13.9 % (ref 11.6–15.1)
GFR SERPL CREATININE-BSD FRML MDRD: 54 ML/MIN/1.73SQ M
GLUCOSE SERPL-MCNC: 94 MG/DL (ref 65–140)
HCT VFR BLD AUTO: 37.4 % (ref 34.8–46.1)
HGB BLD-MCNC: 12.5 G/DL (ref 11.5–15.4)
MCH RBC QN AUTO: 30.8 PG (ref 26.8–34.3)
MCHC RBC AUTO-ENTMCNC: 33.4 G/DL (ref 31.4–37.4)
MCV RBC AUTO: 92 FL (ref 82–98)
PLATELET # BLD AUTO: 174 THOUSANDS/UL (ref 149–390)
PMV BLD AUTO: 11.3 FL (ref 8.9–12.7)
POTASSIUM SERPL-SCNC: 3.4 MMOL/L (ref 3.5–5.3)
RBC # BLD AUTO: 4.06 MILLION/UL (ref 3.81–5.12)
SODIUM SERPL-SCNC: 140 MMOL/L (ref 135–147)
WBC # BLD AUTO: 5.82 THOUSAND/UL (ref 4.31–10.16)

## 2025-02-07 PROCEDURE — 99232 SBSQ HOSP IP/OBS MODERATE 35: CPT | Performed by: FAMILY MEDICINE

## 2025-02-07 PROCEDURE — 85027 COMPLETE CBC AUTOMATED: CPT

## 2025-02-07 PROCEDURE — 97530 THERAPEUTIC ACTIVITIES: CPT

## 2025-02-07 PROCEDURE — 80048 BASIC METABOLIC PNL TOTAL CA: CPT

## 2025-02-07 PROCEDURE — 97110 THERAPEUTIC EXERCISES: CPT

## 2025-02-07 PROCEDURE — 97116 GAIT TRAINING THERAPY: CPT

## 2025-02-07 PROCEDURE — 99232 SBSQ HOSP IP/OBS MODERATE 35: CPT | Performed by: NURSE PRACTITIONER

## 2025-02-07 RX ORDER — POTASSIUM CHLORIDE 1500 MG/1
40 TABLET, EXTENDED RELEASE ORAL ONCE
Status: COMPLETED | OUTPATIENT
Start: 2025-02-07 | End: 2025-02-07

## 2025-02-07 RX ADMIN — CEFTRIAXONE SODIUM 1000 MG: 10 INJECTION, POWDER, FOR SOLUTION INTRAVENOUS at 23:09

## 2025-02-07 RX ADMIN — SENNOSIDES 17.2 MG: 8.6 TABLET ORAL at 17:53

## 2025-02-07 RX ADMIN — ESCITALOPRAM OXALATE 5 MG: 10 TABLET ORAL at 08:34

## 2025-02-07 RX ADMIN — SENNOSIDES 17.2 MG: 8.6 TABLET ORAL at 08:34

## 2025-02-07 RX ADMIN — ACETAMINOPHEN 975 MG: 325 TABLET, FILM COATED ORAL at 16:03

## 2025-02-07 RX ADMIN — POTASSIUM CHLORIDE 40 MEQ: 1500 TABLET, EXTENDED RELEASE ORAL at 12:54

## 2025-02-07 RX ADMIN — POLYETHYLENE GLYCOL 3350 17 G: 17 POWDER, FOR SOLUTION ORAL at 08:34

## 2025-02-07 RX ADMIN — AMIODARONE HYDROCHLORIDE 100 MG: 200 TABLET ORAL at 08:34

## 2025-02-07 RX ADMIN — ACETAMINOPHEN 975 MG: 325 TABLET, FILM COATED ORAL at 08:34

## 2025-02-07 RX ADMIN — BUMETANIDE 2 MG: 1 TABLET ORAL at 08:34

## 2025-02-07 RX ADMIN — METOPROLOL SUCCINATE 25 MG: 25 TABLET, EXTENDED RELEASE ORAL at 08:34

## 2025-02-07 RX ADMIN — ATORVASTATIN CALCIUM 20 MG: 20 TABLET, FILM COATED ORAL at 16:03

## 2025-02-07 RX ADMIN — PRASUGREL 5 MG: 10 TABLET, FILM COATED ORAL at 08:35

## 2025-02-07 RX ADMIN — RIVAROXABAN 15 MG: 15 TABLET, FILM COATED ORAL at 07:22

## 2025-02-07 NOTE — ASSESSMENT & PLAN NOTE
Patient is high risk of delirium due to dementia, hospitalization, and metabolic imbalance, UTI  Initiate delirium precautions  maintain normal sleep/wake cycle  minimize overnight interruptions, group overnight vitals/labs/nursing checks as possible  dim lights, close blinds and turn off tv to minimize stimulation and encourage sleep environment in evenings  ensure that pain is well controlled consider Tylenol 975mg Q8H scheduled   monitor for fecal and urinary retention which may precipitate delirium  encourage early mobilization and ambulation  provide frequent reorientation and redirection  encourage family and friends at the bedside to help calm patient if anxious  avoid medications which may precipitate or worsen delirium such as tramadol, benzodiazepine, anticholinergics, and antihistaminics  encourage hydration and nutrition , assist with feeding if needed  redirect unwanted behaviors as first line, avoid physical restraints.  Patient currently AAO x 2 and confused as to where her  is  UA shows signs of UTI being treated with IV antibiotics  Monitor for urinary retention  currently on zyprexa 2.5 mg PRN-has not required Zyprexa in the last 2 days, will discontinue

## 2025-02-07 NOTE — PLAN OF CARE
Problem: PHYSICAL THERAPY ADULT  Goal: Performs mobility at highest level of function for planned discharge setting.  See evaluation for individualized goals.  Description: Treatment/Interventions: ADL retraining, Functional transfer training, LE strengthening/ROM, Elevations, Therapeutic exercise, Endurance training, Cognitive reorientation, Patient/family training, Equipment eval/education, Bed mobility, Gait training, Compensatory technique education, Spoke to nursing, Spoke to case management, OT          See flowsheet documentation for full assessment, interventions and recommendations.  Outcome: Progressing  Note:    Problem List: Decreased strength, Decreased endurance, Impaired balance, Decreased mobility, Decreased coordination, Decreased cognition, Impaired judgement, Decreased safety awareness  Assessment: pt began tx session lying supine in the bed as pt was agreeable to participate in PT intervention. Pt to focus on bed mobility, transfer training, TE activities, posture/balance with gait, stair trails and increasing pt activity tolerance. pt demonstrated progress with skilled PT intervention in terms of bed mobility as pt was able to complete a supine<>sit EOB transfer with /s, no LOB. pt did utilized bed rail to assist with transfer to seated EOB. pt was able to sit EOB w/o LOB 5 minutes while being educated on functional transfers with RW. pt continues to require min ax1 for all functional transfers with RW. pt required Vc's for hand placement while ascending to RW and descending into the recliner. pt was able to increase ambulation distance with RW to 100'x2 w/o LOB and close/s. pt did require therapeutic rest breaks due to fatigue during todays tx session. pt participated in TE activities whil seated in the recliner with AROM, no increases in pain in order to increase pt activity tolerance and strengthening of LE's. pt was educated w/ verbal/visual demonstration on all safe stair trial strategies  prior to initiating steps. pt completed 7 steps in todays tx session. Initial 4 steps pt required bilateral hand rails and min ax1. In order to simulate home environment pt attempted to complete steps with R sided hand rail and pt completed 3 steps but required min Ax1 to mod ax1 as pt was unable to obtain upright posture and had slight posterior LOB. pt would benenfit from continued skilled PT intervention in order to increase pt independence as pt llives at home alone, safety w/ all OOB activities. Continue to recommend Dc w/ level 2 moderate rehab resource intensity when medically cleared  Barriers to Discharge: Inaccessible home environment, Decreased caregiver support  Barriers to Discharge Comments: pt lives alone at this time and has 3 LUDWIN  Rehab Resource Intensity Level, PT: II (Moderate Resource Intensity)    See flowsheet documentation for full assessment.

## 2025-02-07 NOTE — ASSESSMENT & PLAN NOTE
Wt Readings from Last 3 Encounters:   02/07/25 52.1 kg (114 lb 13.8 oz)   09/27/24 53 kg (116 lb 12.8 oz)   08/16/24 55.4 kg (122 lb 1.6 oz)       Appears euvolemic   Continue PO bumex 2mg daily   I/O, Daily weights  Stable

## 2025-02-07 NOTE — QUICK NOTE
Updated the daughter over the phone.  The patient's  is at [facility].  I did tell the daughter to ask case management about the next steps about guardianship for which is the best way to approach that but daughter is willing to make all the decisions.  Also x-ray still will need to address patient's anxiety and maybe put her on something for that

## 2025-02-07 NOTE — PHYSICAL THERAPY NOTE
PHYSICAL THERAPY NOTE          Patient Name: Verito Fallon  Today's Date: 2/7/2025 02/07/25 1417   PT Last Visit   PT Visit Date 02/07/25   Note Type   Note Type Treatment   Pain Assessment   Pain Assessment Tool 0-10   Pain Score No Pain   Patient's Stated Pain Goal No pain   Hospital Pain Intervention(s) Repositioned;Ambulation/increased activity;Rest;Elevated   Multiple Pain Sites No   Restrictions/Precautions   Weight Bearing Precautions Per Order No   Other Precautions Cognitive;Chair Alarm;Bed Alarm;Fall Risk   General   Chart Reviewed Yes   Additional Pertinent History pt shiela was present throughout todays tx session. pt was able to say all her grand kids name correctly   Response to Previous Treatment Patient with no complaints from previous session.   Family/Caregiver Present Yes  (shiela)   Cognition   Overall Cognitive Status Impaired   Arousal/Participation Alert;Responsive;Cooperative   Attention Attends with cues to redirect   Orientation Level Oriented to person;Oriented to place;Oriented to situation;Disoriented to time   Memory Decreased short term memory;Decreased recall of recent events;Decreased recall of precautions   Following Commands Follows one step commands with increased time or repetition   Comments pt was pleasant and cooperative throughout todays tx session   Subjective   Subjective pt reports no pain in todays tx sesison and was agreeable to participate in PT intervention   Bed Mobility   Supine to Sit 5  Supervision   Additional items Assist x 1;HOB elevated;Bedrails;Increased time required;Verbal cues   Sit to Supine Unable to assess   Additional Comments pt seated OOB in the recliner post tx w/ call bell, legs elevated, chair alarm activated, shiela present and all pt needs met   Transfers   Sit to Stand 4  Minimal assistance   Additional items Assist x 1;Increased time  required;Verbal cues   Stand to Sit 4  Minimal assistance   Additional items Assist x 1;Armrests;Increased time required;Verbal cues   Stand pivot Unable to assess   Additional Comments pt completed 4 STS in todays tx session with RW and min ax1 for safety and balance   Ambulation/Elevation   Gait pattern Decreased foot clearance;Short stride;Excessively slow;Decreased hip extension;Decreased heel strike;Decreased toe off;Foward flexed   Gait Assistance 5  Supervision   Additional items Assist x 1;Verbal cues   Assistive Device Rolling walker   Distance 100'x2 RW  (standing therapeutic rest break in [facility] ambulation trials)   Balance   Static Sitting Fair   Dynamic Sitting Fair -   Static Standing Fair -   Dynamic Standing Poor +   Ambulatory Fair -  (close /s)   Endurance Deficit   Endurance Deficit Yes   Endurance Deficit Description limityed ambulation distance and steps completed in todays tx session   Activity Tolerance   Activity Tolerance Patient limited by fatigue   Nurse Made Aware Spoke to RN   Exercises   Hip Abduction Sitting;15 reps;AROM;Bilateral   Hip Adduction Sitting;15 reps;AROM;Bilateral  (pillow squeezes)   Knee AROM Long Arc Quad Sitting;15 reps;AROM;Bilateral   Ankle Pumps Sitting;20 reps;AROM;Bilateral   Marching Sitting;15 reps;AROM;Bilateral   Assessment   Problem List Decreased strength;Decreased endurance;Impaired balance;Decreased mobility;Decreased coordination;Decreased cognition;Impaired judgement;Decreased safety awareness   Assessment pt began tx session lying supine in the bed as pt was agreeable to participate in PT intervention. Pt to focus on bed mobility, transfer training, TE activities, posture/balance with gait, stair trails and increasing pt activity tolerance. pt demonstrated progress with skilled PT intervention in terms of bed mobility as pt was able to complete a supine<>sit EOB transfer with /s, no LOB. pt did utilized bed rail to assist with transfer to seated EOB. pt  was able to sit EOB w/o LOB 5 minutes while being educated on functional transfers with RW. pt continues to require min ax1 for all functional transfers with RW. pt required Vc's for hand placement while ascending to RW and descending into the recliner. pt was able to increase ambulation distance with RW to 100'x2 w/o LOB and close/s. pt did require therapeutic rest breaks due to fatigue during todays tx session. pt participated in TE activities whil seated in the recliner with AROM, no increases in pain in order to increase pt activity tolerance and strengthening of LE's. pt was educated w/ verbal/visual demonstration on all safe stair trial strategies prior to initiating steps. pt completed 7 steps in [facility] tx session. Initial 4 steps pt required bilateral hand rails and min ax1. In order to simulate home environment pt attempted to complete steps with R sided hand rail and pt completed 3 steps but required min Ax1 to mod ax1 as pt was unable to obtain upright posture and had slight posterior LOB. pt would benenfit from continued skilled PT intervention in order to increase pt independence as pt llives at home alone, safety w/ all OOB activities. Continue to recommend Dc w/ level 2 moderate rehab resource intensity when medically cleared   Barriers to Discharge Inaccessible home environment;Decreased caregiver support   Barriers to Discharge Comments pt lives alone at this time and has 3 LUDWIN   Goals   Patient Goals to see her    STG Expiration Date 02/15/25   PT Treatment Day 1   Plan   Treatment/Interventions Functional transfer training;LE strengthening/ROM;Elevations;Therapeutic exercise;Endurance training;Cognitive reorientation;Patient/family training;Equipment eval/education;Bed mobility;Gait training;Spoke to nursing;Compensatory technique education   Progress Slow progress, decreased activity tolerance   PT Frequency 3-5x/wk   Discharge Recommendation   Rehab Resource Intensity Level, PT II  (Moderate Resource Intensity)   Additional Comments pt is progressing with skilled PT intervention but continues to be limitee with activity tolerance, ambulation distance and steps completed w/o requiring physical asisstance   AM-PAC Basic Mobility Inpatient   Turning in Flat Bed Without Bedrails 3   Lying on Back to Sitting on Edge of Flat Bed Without Bedrails 3   Moving Bed to Chair 3   Standing Up From Chair Using Arms 3   Walk in Room 3   Climb 3-5 Stairs With Railing 3   Basic Mobility Inpatient Raw Score 18   Basic Mobility Standardized Score 41.05   [facility] Highest Level Of Mobility   -HLM Goal 6: Walk 10 steps or more   -HLM Achieved 7: Walk 25 feet or more   Education   Education Provided Mobility training;Assistive device;Other  (TE activities, stair trials and functional transfers)   Patient Reinforcement needed   End of Consult   Patient Position at End of Consult Bedside chair;Bed/Chair alarm activated;All needs within reach   The patient's AM-PAC Basic Mobility Inpatient Short Form Raw Score is 18. A Raw score of greater than 16 suggests the patient may benefit from discharge to home. Please also refer to the recommendation of the Physical Therapist for safe discharge planning.    Pt continues to require assistance for all functional transfers and remains limited with independence of stair trials. Pt would benefit from continued skilled PT intervention in order to address deficits listed above. Huber Millard

## 2025-02-07 NOTE — ASSESSMENT & PLAN NOTE
Continue ceftriaxone  Monitor for urinary retention-bladder scan ordered  Further management per primary

## 2025-02-07 NOTE — PROGRESS NOTES
Progress Note - Geriatric Medicine   Name: Verito Fallon 84 y.o. female I MRN: [id number]  Unit/Bed#: S -01 I Date of Admission: 2/4/2025   Date of Service: 2/7/2025 I Hospital Day: 2     Assessment & Plan  Metabolic encephalopathy  Patient is high risk of delirium due to dementia, hospitalization, and metabolic imbalance, UTI  Initiate delirium precautions  maintain normal sleep/wake cycle  minimize overnight interruptions, group overnight vitals/labs/nursing checks as possible  dim lights, close blinds and turn off tv to minimize stimulation and encourage sleep environment in evenings  ensure that pain is well controlled consider Tylenol 975mg Q8H scheduled   monitor for fecal and urinary retention which may precipitate delirium  encourage early mobilization and ambulation  provide frequent reorientation and redirection  encourage family and friends at the bedside to help calm patient if anxious  avoid medications which may precipitate or worsen delirium such as tramadol, benzodiazepine, anticholinergics, and antihistaminics  encourage hydration and nutrition , assist with feeding if needed  redirect unwanted behaviors as first line, avoid physical restraints.  Patient currently AAO x 2 and confused as to where her  is  UA shows signs of UTI being treated with IV antibiotics  Monitor for urinary retention  currently on zyprexa 2.5 mg PRN-has not required Zyprexa in the last 2 days, will discontinue  UTI (urinary tract infection)  Continue ceftriaxone  Monitor for urinary retention-bladder scan ordered  Further management per primary  Dementia (HCC)   In 2021 patient scored 18/30 on MoCA  CT head shows moderate microangiopathic changes   Mini Cog 3/5 during this hospitalization  Patient alert to person and place on exam  Pt. Lives at home by herself-would recommend placement  Independent for instrumental ADLs. She used to have a home health aide, but recently fired her.  Independent for ADLs  No  behaviors  TSH 1.531  Recommend checking B12  [place] to person, place, time, and situation as needed  Monitor for behaviors   Monitor for mental status change  Provide supportive care   Sertraline was switched to Lexapro which started today  Continue delirium precautions as above  Constipation  Patient complains of constipation  Patient reports 2 very small bowel movements this morning  Is currently on MiraLax 17 g and senna 12.2 tab, 2 tabs BID-consider adding suppository  Encourage adequate p.o. Hydration  Encourage prune juice as tolerated  Encourage ambulation as tolerated.      Subjective:   Leah is a 84-year-old female presents sitting up comfortably in bed with lunch present. She stated she slept well and only got up once during the night to urinate.  Reports dysuria-currently being treated for UTI.  Patient states she has had a good appetite and did not eat much for breakfast but lunch and looks appealing.  Patient states she has 2 extremely small bowel movements this morning and feels as though she is straining, reports feeling constipated.  Per nursing no acute concerns or issues at this time.    Review of Systems   Constitutional:  Positive for activity change.   Eyes:  Negative for visual disturbance.   Respiratory:  Negative for cough and shortness of breath.    Cardiovascular:  Negative for chest pain and palpitations.   Gastrointestinal:  Positive for constipation. Negative for abdominal pain and vomiting.   Genitourinary:  Positive for frequency. Negative for dysuria.   Musculoskeletal:  Positive for gait problem. Negative for arthralgias and back pain.   Skin:  Negative for color change and rash.   Neurological:  Positive for weakness. Negative for dizziness.   Psychiatric/Behavioral:  Positive for confusion.          Objective:     Vitals: Blood pressure 165/75, pulse 79, temperature (!) 97.4 °F (36.3 °C), temperature source Oral, resp. rate 18, weight 52.1 kg (114 lb 13.8 oz), SpO2 98%, not  "currently breastfeeding.,Body mass index is 23.2 kg/m².      Intake/Output Summary (Last 24 hours) at 2/7/2025 1241  Last data filed at 2/6/2025 1828  Gross per 24 hour   Intake 440 ml   Output --   Net 440 ml       Current Medications: Reviewed    Physical Exam:   Physical Exam  Vitals reviewed.   Constitutional:       General: She is not in acute distress.     Appearance: She is not ill-appearing.      Comments: Frail appearing    Cardiovascular:      Rate and Rhythm: Normal rate and regular rhythm.      Pulses: Normal pulses.      Heart sounds: Normal heart sounds. No murmur heard.  Pulmonary:      Effort: Pulmonary effort is normal. No respiratory distress.      Breath sounds: Normal breath sounds.   Abdominal:      General: Bowel sounds are normal. There is no distension.      Palpations: Abdomen is soft.   Musculoskeletal:         General: No swelling.   Skin:     General: Skin is warm and dry.      Findings: Bruising present.      Comments: Bruising along dorsal aspects of hands and forearms bilaterally   Neurological:      General: No focal deficit present.      Mental Status: She is alert. Mental status is at baseline.      Cranial Nerves: No cranial nerve deficit.      Motor: Weakness present.      Gait: Gait abnormal.      Comments: AAO x 2   Psychiatric:         Mood and Affect: Mood normal.          Invasive Devices       Peripheral Intravenous Line  Duration             Peripheral IV 02/04/25 Right Forearm 2 days                    Lab, Imaging and other studies: Results Review Statement: No pertinent imaging studies reviewed.    Please note:  Voice-recognition software may have been used in the preparation of this document.  Occasional wrong word or \"sound-alike\" substitutions may have occurred due to the inherent limitations of voice recognition software.  Interpretation should be guided by context.     "

## 2025-02-07 NOTE — ASSESSMENT & PLAN NOTE
In 2021 patient scored 18/30 on MoCA  CT head shows moderate microangiopathic changes   Mini Cog 3/5 during this hospitalization  Patient alert to person and place on exam  Pt. Lives at home by herself-would recommend placement  Independent for instrumental ADLs. She used to have a home health aide, but recently fired her.  Independent for ADLs  No behaviors  TSH 1.531  Recommend checking B12  [place] to person, place, time, and situation as needed  Monitor for behaviors   Monitor for mental status change  Provide supportive care   Sertraline was switched to Lexapro which started today  Continue delirium precautions as above

## 2025-02-07 NOTE — CASE MANAGEMENT
Case Management Discharge Planning Note    Patient name Verito Fallon  Location S /S -01 MRN [id number]  : 1940 Date 2025       Current Admission Date: 2025  Current Admission Diagnosis:Metabolic encephalopathy   Patient Active Problem List    Diagnosis Date Noted Date Diagnosed    Constipation 2025     Acute on chronic combined systolic and diastolic congestive heart failure (HCC) 2024     Chronic kidney disease, stage 3b ([facility]) 2024     Osteoporosis 2024     Presence of permanent cardiac pacemaker 2024     Hypokalemia 2023     History of seizure 12/15/2023     At risk for constipation 12/15/2023     At risk for delirium 12/15/2023     Advance care planning 12/15/2023     Hyperkalemia 12/15/2023     Leg hematoma, right, subsequent encounter 2023     Pruritic rash 2023     Cervical spondylosis      Cervical disc disorder with radiculopathy of mid-cervical region      Atherosclerosis with claudication of extremity ([facility]) 2021     Carotid stenosis, asymptomatic, bilateral 2021     Dementia ([facility]) 2021     Current use of long term anticoagulation 2021     Long term current use of amiodarone 2021     Renal artery stenosis ([facility]) 2021     Pulmonary hypertension ([facility]) 2021     Sick sinus syndrome ([facility]) 2021     Hx of CABG 2021     CAD (coronary artery disease) 2021     Depression 2021     UTI (urinary tract infection) 2021     Hyperlipidemia 2021     Metabolic encephalopathy 2021     A-fib ([facility]) 2021     PAD (peripheral artery disease) ([facility]) 2021     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[facility]) 2021     Hypertensive heart and 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 ([facility]) 2017       LOS (days): 2  Geometric Mean LOS (GMLOS) (days): 3.8  Days to GMLOS:1.8     OBJECTIVE:  Risk of Unplanned Readmission Score: 16.9          Current admission status: Inpatient   Preferred Pharmacy:   [id number] - [place], [place] - [address]  [address] [place] [address]  Phone: [phone] Fax: [phone]    [facility] - [place] - [address]  [address] [place] [address]  Phone: [phone] Fax: [phone]    Primary Care Provider: Robbie Warner DO    Primary Insurance: MEDICARE  Secondary Insurance: AETNA    DISCHARGE DETAILS:    Other Referral/Resources/Interventions Provided:  Interventions: Other (Specify)  Referral Comments: CM completed MA51 and PASRR. Pt triggered a positive PASRR. CM sent all appropriate documentation to [facility] for review. Pt primary CM to continue to follow for ongoing dcp needs.    Treatment Team Recommendation: Short Term Rehab  Discharge Destination Plan:: Short Term Rehab

## 2025-02-07 NOTE — PLAN OF CARE
Problem: Potential for Falls  Goal: Patient will remain free of falls  Description: INTERVENTIONS:  - Educate patient/family on patient safety including physical limitations  - Instruct patient to call for assistance with activity   - Consult OT/PT to assist with strengthening/mobility   - Keep Call bell within reach  - Keep bed low and locked with side rails adjusted as appropriate  - Keep care items and personal belongings within reach  - Initiate and maintain comfort rounds  - Make Fall Risk Sign visible to staff  - Offer Toileting every 2 Hours, in advance of need  - Initiate/Maintain bed/chair alarm  - Obtain necessary fall risk management equipment  - Apply yellow socks and bracelet for high fall risk patients  - Consider moving patient to room near nurses station  Outcome: Progressing     Problem: NEUROSENSORY - ADULT  Goal: Achieves stable or improved neurological status  Description: INTERVENTIONS  - Monitor and report changes in neurological status  - Monitor vital signs such as temperature, blood pressure, glucose, and any other labs ordered   - Initiate measures to prevent increased intracranial pressure  - Monitor for seizure activity and implement precautions if appropriate      Outcome: Progressing  Goal: Remains free of injury related to seizures activity  Description: INTERVENTIONS  - Maintain airway, patient safety  and administer oxygen as ordered  - Monitor patient for seizure activity, document and report duration and description of seizure to physician/advanced practitioner  - If seizure occurs,  ensure patient safety during seizure  - Reorient patient post seizure  - Seizure pads on all 4 side rails  - Instruct patient/family to notify RN of any seizure activity including if an aura is experienced  - Instruct patient/family to call for assistance with activity based on nursing assessment  - Administer anti-seizure medications if ordered    Outcome: Progressing  Goal: Achieves maximal  functionality and self care  Description: INTERVENTIONS  - Monitor swallowing and airway patency with patient fatigue and changes in neurological status  - Encourage and assist patient to increase activity and self care.   - Encourage visually impaired, hearing impaired and aphasic patients to use assistive/communication devices  Outcome: Progressing

## 2025-02-07 NOTE — ASSESSMENT & PLAN NOTE
Continue Prasugrel, statin    [FreeTextEntry1] : She is a 48 y/o BRCA negative  F who had Stage II right invasive ductal carcinoma s/p ddACT followed by RT and had been on anastrozole since 2021. We reviewed her Pet/ CT imaging and awaiting biopsy of the R iliac for confirmation of metastatic breast cancer and repeating ER/AL/ Her2. We reviewed depending on the pathology, this would drive treatment. We explained role of denosumab every 28 days in setting of metastatic disease to the bones and reviewed this will also treat her osteoporosis. Reviewed her blood work we had done in preparation for bone biopsy and the breast cancer tumor markers. We reviewed how we would trend tumor markers on treatment. She understands she can have her care at  or other facility. We will give her our recommendations after bone biopsy resulted.   Mid back pain and hip pain from metastatic disease: explained rationale for low dose dexamethasone for bone pain due to cancer. We reviewed goal of tapering her off medication once RT complete. We reviewed goals of tracking pain and potential for adjusting dexamethasone if pain was not well controlled enough. We addressed her concern of long term steroids for osteoporosis which we explained: this will not be long term. Questions answered to her satisfaction. She is agreeable with plan. [Palliative] : Goals of care discussed with patient: Palliative

## 2025-02-07 NOTE — ASSESSMENT & PLAN NOTE
UA indicating UTI  Urine culture showing gram-negative rods, await for finalization  Started on IV ceftriaxone, will continue  Likely contributing to confusion.  Can stop antibiotics after 3 days

## 2025-02-07 NOTE — ASSESSMENT & PLAN NOTE
Lab Results   Component Value Date    EGFR 54 02/07/2025    EGFR 52 02/06/2025    EGFR 50 02/05/2025    CREATININE 0.96 02/07/2025    CREATININE 0.98 02/06/2025    CREATININE 1.01 02/05/2025     Cr currently stable at baseline   Monitor BMP

## 2025-02-07 NOTE — PROGRESS NOTES
Progress Note - Hospitalist   Name: Verito Fallon 84 y.o. female I MRN: [id number]  Unit/Bed#: S -01 I Date of Admission: 2/4/2025   Date of Service: 2/7/2025 I Hospital Day: 2    Assessment & Plan  Metabolic encephalopathy  Patient with history of cognitive impairment presents to the ER on 302 due to concern regarding suicidal statements while she was visiting her  in the nursing home today. 302 not upheld by the ER provider as patient not currently expressing suicidal intent.   Patient's daughter states that Verito's cognition has been steadily declining for some time and has been recently exacerbated by her  being sick and placed in a nursing home. Prior to this, her  was primary caretaker. Patient has expressed suicidal intent in the past according to daughter however this has been used as a manipulative tactic.   Patient is currently oriented to self only. She currently denies SI/HI.   UA appears to be infected which may be exacerbating her confusion, urine culture shows gram-negative rods, awaiting for finalization  IV Ceftriaxone for suspected UTI   Delirium precautions   Continual virtual observation, to begin to wean off the next 24 hours  Psychiatry consulted and ultimately had no further inpatient recommendations  Geriatrics consult appreciated   PT/OT-level 2 recommendations with AM-PAC score 17  Neuropsych consulted and currently determined patient has no capacity.  Will need to likely be options.  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)  Wt Readings from Last 3 Encounters:   02/07/25 52.1 kg (114 lb 13.8 oz)   09/27/24 53 kg (116 lb 12.8 oz)   08/16/24 55.4 kg (122 lb 1.6 oz)       Appears euvolemic   Continue PO bumex 2mg daily   I/O, Daily weights  Stable      A-fib (HCC)  Rate controlled   Continue Xarelto, amiodarone, Toprol XL   PAD (peripheral artery disease) (HCC)  Continue Prasugrel, statin   Hyperlipidemia  Continue  statin   Sick sinus syndrome (HCC)  S/p PPM   CAD (coronary artery disease)  S/p CABG   Continue Prasugrel, Xarelto, BB, statin   Chronic kidney disease, stage 3b ([facility])  Lab Results   Component Value Date    EGFR 54 02/07/2025    EGFR 52 02/06/2025    EGFR 50 02/05/2025    CREATININE 0.96 02/07/2025    CREATININE 0.98 02/06/2025    CREATININE 1.01 02/05/2025     Cr currently stable at baseline   Monitor BMP  UTI (urinary tract infection)  UA indicating UTI  Urine culture showing gram-negative rods, await for finalization  Started on IV ceftriaxone, will continue  Likely contributing to confusion.  Can stop antibiotics after 3 days  Dementia ([facility])  Alert and oriented  x to self and location, however not to time or situation  High risk for delirium  Continue with delirium precautions  Encouraged avoidance of overstimulation/under stimulation of sensory organs including appropriate wake/sleep cycle, avoiding unnecessary sounds including call bells and additional monitors, ambient light, and appropriate room lighting/television usage for time of day.  Encourage use of assistive devices such as corrective lenses at all appropriate times to reduce risk of uncorrected sensory impairment from converting to isolation, confusion, encephalopathy and more precipitous cognitive decline     VTE Pharmacologic Prophylaxis: VTE Score: 4 High Risk (Score >/= 5) - Pharmacological DVT Prophylaxis Ordered: rivaroxaban (Xarelto). Sequential Compression Devices Ordered.    Mobility:   Basic Mobility Inpatient Raw Score: 17  JH-HLM Goal: 5: Stand one or more mins  JH-HLM Achieved: 5: Stand (1 or more minutes)  JH-HLM Goal achieved. Continue to encourage appropriate mobility.    Patient Centered Rounds: I performed bedside rounds with nursing staff today.   Discussions with Specialists or Other Care Team Provider: Case management    Education and Discussions with Family / Patient:  I will update the daughter.     Current Length of Stay: 2  day(s)  Current Patient Status: Inpatient   Certification Statement: The patient will continue to require additional inpatient hospital stay due to needing safe disposition in a patient who is elderly with no capacity to make decisions  Discharge Plan:  Patient will likely need to be option for skilled nursing    Code Status: Level 3 - DNAR and DNI    Subjective   Patient seen and examined.  No acute events reported.  Patient not the greatest historian.    Objective :  Temp:  [97.4 °F (36.3 °C)-98.1 °F (36.7 °C)] 97.4 °F (36.3 °C)  HR:  [79-86] 79  BP: ()/(54-80) 165/75  Resp:  [18-20] 18  SpO2:  [98 %-100 %] 98 %  O2 Device: None (Room air)    Body mass index is 23.2 kg/m².     Input and Output Summary (last 24 hours):     Intake/Output Summary (Last 24 hours) at 2/7/2025 1235  Last data filed at 2/6/2025 1828  Gross per 24 hour   Intake 440 ml   Output --   Net 440 ml       Physical Exam  General Appearance:    Alert, cooperative, no distress, appears stated age                               Lungs:     Clear to auscultation bilaterally, respirations unlabored       Heart:    Regular rate and rhythm, S1 and S2 normal, no murmur, rub    or gallop   Abdomen:     Soft, non-tender, bowel sounds active all four quadrants,     no masses, no organomegaly           Extremities:   Extremities normal, atraumatic, no cyanosis or edema                         Lines/Drains:              Lab Results: I have reviewed the following results:   Results from last 7 days   Lab Units 02/07/25  0632 02/06/25  0912   WBC Thousand/uL 5.82 6.92   HEMOGLOBIN g/dL 12.5 13.5   HEMATOCRIT % 37.4 41.1   PLATELETS Thousands/uL 174 193   SEGS PCT %  --  70   LYMPHO PCT %  --  16   MONO PCT %  --  8   EOS PCT %  --  5     Results from last 7 days   Lab Units 02/07/25  0632   SODIUM mmol/L 140   POTASSIUM mmol/L 3.4*   CHLORIDE mmol/L 104   CO2 mmol/L 31   BUN mg/dL 16   CREATININE mg/dL 0.96   ANION GAP mmol/L 5   CALCIUM mg/dL 8.8   GLUCOSE  RANDOM mg/dL 94         Results from last 7 days   Lab Units 02/04/25  1523   POC GLUCOSE mg/dl 72               Recent Cultures (last 7 days):   Results from last 7 days   Lab Units 02/04/25  1848   URINE CULTURE  >100,000 cfu/ml Escherichia coli*       Imaging Results Review: No pertinent imaging studies reviewed.  Other Study Results Review: No additional pertinent studies reviewed.    Last 24 Hours Medication List:     Current Facility-Administered Medications:     acetaminophen (TYLENOL) tablet 650 mg, Q6H PRN    acetaminophen (TYLENOL) tablet 975 mg, TID    amiodarone tablet 100 mg, Daily    atorvastatin (LIPITOR) tablet 20 mg, Daily With Dinner    bumetanide (BUMEX) tablet 2 mg, Daily    ceftriaxone (ROCEPHIN) 1 g/50 mL in dextrose IVPB, Q24H, Last Rate: 1,000 mg (02/06/25 2225)    escitalopram (LEXAPRO) tablet 5 mg, Daily    melatonin tablet 3 mg, HS    metoprolol succinate (TOPROL-XL) 24 hr tablet 25 mg, Daily    OLANZapine (ZyPREXA) IM injection 2.5 mg, Q8H PRN    OLANZapine (ZyPREXA) IM injection 5 mg, Once    polyethylene glycol (MIRALAX) packet 17 g, Daily    prasugrel (EFFIENT) tablet 5 mg, Daily    rivaroxaban (XARELTO) tablet 15 mg, Daily With Breakfast    senna (SENOKOT) tablet 17.2 mg, BID    Administrative Statements   Today, Patient Was Seen By: Ryan Byrd MD  I have spent a total time of 20 minutes in caring for this patient on the day of the visit/encounter including Diagnostic results, Risks and benefits of tx options, Instructions for management, Patient and family education, Importance of tx compliance, Risk factor reductions, Documenting in the medical record, Reviewing / ordering tests, medicine, procedures  , and Obtaining or reviewing history  .    **Please Note: This note may have been constructed using a voice recognition system.**

## 2025-02-07 NOTE — ASSESSMENT & PLAN NOTE
Patient with history of cognitive impairment presents to the ER on 302 due to concern regarding suicidal statements while she was visiting her  in the nursing home today. 302 not upheld by the ER provider as patient not currently expressing suicidal intent.   Patient's daughter states that Verito's cognition has been steadily declining for some time and has been recently exacerbated by her  being sick and placed in a nursing home. Prior to this, her  was primary caretaker. Patient has expressed suicidal intent in the past according to daughter however this has been used as a manipulative tactic.   Patient is currently oriented to self only. She currently denies SI/HI.   UA appears to be infected which may be exacerbating her confusion, urine culture shows gram-negative rods, awaiting for finalization  IV Ceftriaxone for suspected UTI   Delirium precautions   Continual virtual observation, to begin to wean off the next 24 hours  Psychiatry consulted and ultimately had no further inpatient recommendations  Geriatrics consult appreciated   PT/OT-level 2 recommendations with AM-PAC score 17  Neuropsych consulted and currently determined patient has no capacity.  Will need to likely be options.  Off virtual observation

## 2025-02-08 PROCEDURE — 99232 SBSQ HOSP IP/OBS MODERATE 35: CPT | Performed by: PHYSICIAN ASSISTANT

## 2025-02-08 RX ADMIN — ACETAMINOPHEN 975 MG: 325 TABLET, FILM COATED ORAL at 08:34

## 2025-02-08 RX ADMIN — PRASUGREL 5 MG: 10 TABLET, FILM COATED ORAL at 11:00

## 2025-02-08 RX ADMIN — BUMETANIDE 2 MG: 1 TABLET ORAL at 08:35

## 2025-02-08 RX ADMIN — RIVAROXABAN 15 MG: 15 TABLET, FILM COATED ORAL at 08:34

## 2025-02-08 RX ADMIN — CEFTRIAXONE SODIUM 1000 MG: 10 INJECTION, POWDER, FOR SOLUTION INTRAVENOUS at 23:13

## 2025-02-08 RX ADMIN — AMIODARONE HYDROCHLORIDE 100 MG: 200 TABLET ORAL at 08:35

## 2025-02-08 RX ADMIN — METOPROLOL SUCCINATE 25 MG: 25 TABLET, EXTENDED RELEASE ORAL at 08:34

## 2025-02-08 RX ADMIN — MELATONIN 3 MG: 3 TAB ORAL at 23:20

## 2025-02-08 RX ADMIN — ATORVASTATIN CALCIUM 20 MG: 20 TABLET, FILM COATED ORAL at 17:22

## 2025-02-08 RX ADMIN — ESCITALOPRAM OXALATE 5 MG: 10 TABLET ORAL at 08:34

## 2025-02-08 NOTE — CASE MANAGEMENT
Case Management Discharge Planning Note    Patient name Verito Fallon  Location S /S -01 MRN [id number]  : 1940 Date 2025       Current Admission Date: 2025  Current Admission Diagnosis:Metabolic encephalopathy   Patient Active Problem List    Diagnosis Date Noted Date Diagnosed    Constipation 2025     Acute on chronic combined systolic and diastolic congestive heart failure (HCC) 2024     Chronic kidney disease, stage 3b ([facility]) 2024     Osteoporosis 2024     Presence of permanent cardiac pacemaker 2024     Hypokalemia 2023     History of seizure 12/15/2023     At risk for constipation 12/15/2023     At risk for delirium 12/15/2023     Advance care planning 12/15/2023     Hyperkalemia 12/15/2023     Leg hematoma, right, subsequent encounter 2023     Pruritic rash 2023     Cervical spondylosis      Cervical disc disorder with radiculopathy of mid-cervical region      Atherosclerosis with claudication of extremity ([facility]) 2021     Carotid stenosis, asymptomatic, bilateral 2021     Dementia ([facility]) 2021     Current use of long term anticoagulation 2021     Long term current use of amiodarone 2021     Renal artery stenosis ([facility]) 2021     Pulmonary hypertension ([facility]) 2021     Sick sinus syndrome ([facility]) 2021     Hx of CABG 2021     CAD (coronary artery disease) 2021     Depression 2021     UTI (urinary tract infection) 2021     Hyperlipidemia 2021     Metabolic encephalopathy 2021     A-fib ([facility]) 2021     PAD (peripheral artery disease) (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) 2017       LOS (days): 3  Geometric Mean LOS (GMLOS) (days): 3.8  Days to GMLOS:0.7     OBJECTIVE:  Risk of Unplanned Readmission Score: 14.7          Current admission status: Inpatient   Preferred Pharmacy:   [id number] - [place], [place] - [address]  [address] [place] [address]  Phone: [phone] Fax: [phone]    [facility] - [place] - [address]  [address] [address]  Phone: [phone] Fax: [phone]    Primary Care Provider: Robbie Warner DO    Primary Insurance: MEDICARE  Secondary Insurance: AET[employer]    DISCHARGE DETAILS:    Discharge planning discussed with:: VM left for daughter Melyssa                      Contacts  Patient Contacts: Melyssa San (Daughter)  Relationship to Patient:: Family  Contact Method: Phone  Phone Number: [phone]  Reason/Outcome: Emergency Contact, Discharge Planning    Requested Home Health Care         Is the patient interested in HHC at discharge?: No    DME Referral Provided  Referral made for DME?: No    Other Referral/Resources/Interventions Provided:  Interventions: Short Term Rehab       CM was notified by nursing that patient's daughter was here.  When CM had gone to patient's room daughter was no longer on site.  CM called daughter however was only able to leave a voicemail with call back info.    CM department will continue to follow to assist with discharge coordination.

## 2025-02-08 NOTE — ASSESSMENT & PLAN NOTE
Lab Results   Component Value Date    EGFR 54 02/07/2025    EGFR 52 02/06/2025    EGFR 50 02/05/2025    CREATININE 0.96 02/07/2025    CREATININE 0.98 02/06/2025    CREATININE 1.01 02/05/2025     Creatinine currently stable at baseline   Monitor BMP

## 2025-02-08 NOTE — ASSESSMENT & PLAN NOTE
Patient with history of cognitive impairment presents to the ER on 302 due to concern regarding suicidal statements while she was visiting her  in the nursing home today. 302 not upheld by the ER provider as patient not currently expressing suicidal intent.   Patient's daughter states that Verito's cognition has been steadily declining for some time and has been recently exacerbated by her  being sick and placed in a nursing home. Prior to this, her  was primary caretaker. Patient has expressed suicidal intent in the past according to daughter however this has been used as a manipulative tactic.   Patient is currently oriented to self only. She currently denies SI/HI.   UA appears to be infected which may be exacerbating her confusion, urine culture shows gram-negative rods, awaiting for finalization  IV Ceftriaxone for suspected UTI   Complete 5 days of antibiotics on 2/8  Delirium precautions   Continual virtual observation, to begin to wean off the next 24 hours  Psychiatry consulted and ultimately had no further inpatient recommendations  Geriatrics consult appreciated   PT/OT-level 2 recommendations with AM-PAC score 17  Neuropsych consulted and currently determined patient has no capacity.    Will need to likely be options.    Off virtual observation

## 2025-02-08 NOTE — PROGRESS NOTES
Progress Note - Hospitalist   Name: Verito Fallon 84 y.o. female I MRN: [id number]  Unit/Bed#: S -01 I Date of Admission: 2/4/2025   Date of Service: 2/8/2025 I Hospital Day: 3    Assessment & Plan  Metabolic encephalopathy  Patient with history of cognitive impairment presents to the ER on 302 due to concern regarding suicidal statements while she was visiting her  in the nursing home today. 302 not upheld by the ER provider as patient not currently expressing suicidal intent.   Patient's daughter states that Verito's cognition has been steadily declining for some time and has been recently exacerbated by her  being sick and placed in a nursing home. Prior to this, her  was primary caretaker. Patient has expressed suicidal intent in the past according to daughter however this has been used as a manipulative tactic.   Patient is currently oriented to self only. She currently denies SI/HI.   UA appears to be infected which may be exacerbating her confusion, urine culture shows gram-negative rods, awaiting for finalization  IV Ceftriaxone for suspected UTI   Complete 5 days of antibiotics on 2/8  Delirium precautions   Continual virtual observation, to begin to wean off the next 24 hours  Psychiatry consulted and ultimately had no further inpatient recommendations  Geriatrics consult appreciated   PT/OT-level 2 recommendations with AM-PAC score 17  Neuropsych consulted and currently determined patient has no capacity.    Will need to likely be options.    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)  Wt Readings from Last 3 Encounters:   02/08/25 50.7 kg (111 lb 11.2 oz)   09/27/24 53 kg (116 lb 12.8 oz)   08/16/24 55.4 kg (122 lb 1.6 oz)       Appears euvolemic   Continue PO bumex 2mg daily   I/O, Daily weights  Stable      A-fib (HCC)  Rate controlled   Continue Xarelto, Amiodarone, Toprol XL   PAD (peripheral artery disease) (HCC)  Continue  Prasugrel, statin   Sick sinus syndrome (HCC)  S/p PPM   CAD (coronary artery disease)  S/p CABG   Continue Prasugrel, Xarelto, BB, statin   Chronic kidney disease, stage 3b ([facility])  Lab Results   Component Value Date    EGFR 54 02/07/2025    EGFR 52 02/06/2025    EGFR 50 02/05/2025    CREATININE 0.96 02/07/2025    CREATININE 0.98 02/06/2025    CREATININE 1.01 02/05/2025     Creatinine currently stable at baseline   Monitor BMP  UTI (urinary tract infection)  UA indicating UTI  Urine culture showing gram-negative rods, await for finalization  Started on IV ceftriaxone, will continue for 5 days total - completed on 2/8   Dementia ([facility])  Alert and oriented x to self and location, however not to time or situation  High risk for delirium  Continue with delirium precautions  Encouraged avoidance of overstimulation/under stimulation of sensory organs including appropriate wake/sleep cycle, avoiding unnecessary sounds including call bells and additional monitors, ambient light, and appropriate room lighting/television usage for time of day.  Encourage use of assistive devices such as corrective lenses at all appropriate times to reduce risk of uncorrected sensory impairment from converting to isolation, confusion, encephalopathy and more precipitous cognitive decline   Constipation  Continue a bowel regimen     VTE Pharmacologic Prophylaxis: VTE Score: 4 Moderate Risk (Score 3-4) - Pharmacological DVT Prophylaxis Ordered: rivaroxaban (Xarelto).    Mobility:   Basic Mobility Inpatient Raw Score: 18  -HLM Goal: 6: Walk 10 steps or more  JH-HLM Achieved: 2: Bed activities/Dependent transfer  JH-HLM Goal NOT achieved. Continue with multidisciplinary rounding and encourage appropriate mobility to improve upon JH-HLM goals.    Patient Centered Rounds: I performed bedside rounds with nursing staff today.   Discussions with Specialists or Other Care Team Provider: Discussed with RN, CM     Education and Discussions with Family /  Patient: Attempted to update  (daughter) via phone. Left voicemail.     Current Length of Stay: 3 day(s)  Current Patient Status: Inpatient   Certification Statement: The patient will continue to require additional inpatient hospital stay due to optioning process  Discharge Plan:  Unclear - medically stable pending optioning process     Code Status: Level 3 - DNAR and DNI    Subjective   Patient reports no complaints at this time. Denies pain.     Objective :  Temp:  [97.8 °F (36.6 °C)-98.2 °F (36.8 °C)] 98.2 °F (36.8 °C)  HR:  [76-79] 79  BP: (136-152)/(62-80) 136/62  Resp:  [17-18] 17  SpO2:  [90 %-96 %] 96 %  O2 Device: None (Room air)    Body mass index is 22.56 kg/m².     Input and Output Summary (last 24 hours):     Intake/Output Summary (Last 24 hours) at 2/8/2025 1320  Last data filed at 2/8/2025 1121  Gross per 24 hour   Intake 960 ml   Output 425 ml   Net 535 ml       Physical Exam  Vitals and nursing note reviewed.   Constitutional:       General: She is not in acute distress.     Appearance: Normal appearance. She is normal weight. She is not ill-appearing or diaphoretic.   HENT:      Head: Normocephalic and atraumatic.      Mouth/Throat:      Mouth: Mucous membranes are moist.   Eyes:      General: No scleral icterus.     Pupils: Pupils are equal, round, and reactive to light.   Cardiovascular:      Rate and Rhythm: Normal rate and regular rhythm.      Pulses: Normal pulses.      Heart sounds: Normal heart sounds, S1 normal and S2 normal. No murmur heard.     No systolic murmur is present.      No diastolic murmur is present.      No gallop. No S3 or S4 sounds.   Pulmonary:      Effort: Pulmonary effort is normal. No accessory muscle usage or respiratory distress.      Breath sounds: Normal breath sounds. No stridor. No decreased breath sounds, wheezing, rhonchi or rales.   Chest:      Chest wall: No tenderness.   Abdominal:      General: Bowel sounds are normal. There is no distension.       Palpations: Abdomen is soft.      Tenderness: There is no abdominal tenderness. There is no guarding.   Musculoskeletal:      Right lower leg: No edema.      Left lower leg: No edema.   Skin:     General: Skin is warm and dry.      Coloration: Skin is not jaundiced.   Neurological:      General: No focal deficit present.      Mental Status: She is alert. Mental status is at baseline.      Motor: No tremor or seizure activity.   Psychiatric:         Behavior: Behavior is cooperative.           Lines/Drains:              Lab Results: I have reviewed the following results:   Results from last 7 days   Lab Units 02/07/25  0632 02/06/25  0912   WBC Thousand/uL 5.82 6.92   HEMOGLOBIN g/dL 12.5 13.5   HEMATOCRIT % 37.4 41.1   PLATELETS Thousands/uL 174 193   SEGS PCT %  --  70   LYMPHO PCT %  --  16   MONO PCT %  --  8   EOS PCT %  --  5     Results from last 7 days   Lab Units 02/07/25  0632   SODIUM mmol/L 140   POTASSIUM mmol/L 3.4*   CHLORIDE mmol/L 104   CO2 mmol/L 31   BUN mg/dL 16   CREATININE mg/dL 0.96   ANION GAP mmol/L 5   CALCIUM mg/dL 8.8   GLUCOSE RANDOM mg/dL 94         Results from last 7 days   Lab Units 02/04/25  1523   POC GLUCOSE mg/dl 72               Recent Cultures (last 7 days):   Results from last 7 days   Lab Units 02/04/25  1848   URINE CULTURE  >100,000 cfu/ml Escherichia coli*       Imaging Results Review: No pertinent imaging studies reviewed.  Other Study Results Review: No additional pertinent studies reviewed.    Last 24 Hours Medication List:     Current Facility-Administered Medications:     acetaminophen (TYLENOL) tablet 650 mg, Q6H PRN    acetaminophen (TYLENOL) tablet 975 mg, TID    amiodarone tablet 100 mg, Daily    atorvastatin (LIPITOR) tablet 20 mg, Daily With Dinner    bumetanide (BUMEX) tablet 2 mg, Daily    ceftriaxone (ROCEPHIN) 1 g/50 mL in dextrose IVPB, Q24H, Last Rate: 1,000 mg (02/07/25 2309)    escitalopram (LEXAPRO) tablet 5 mg, Daily    melatonin tablet 3 mg, HS     metoprolol succinate (TOPROL-XL) 24 hr tablet 25 mg, Daily    polyethylene glycol (MIRALAX) packet 17 g, Daily    prasugrel (EFFIENT) tablet 5 mg, Daily    rivaroxaban (XARELTO) tablet 15 mg, Daily With Breakfast    senna (SENOKOT) tablet 17.2 mg, BID    Administrative Statements   Today, Patient Was Seen By: Cyril Schofield PA-C  I have spent a total time of 35 minutes in caring for this patient on the day of the visit/encounter including Diagnostic results, Instructions for management, Impressions, Counseling / Coordination of care, Documenting in the medical record, Reviewing / ordering tests, medicine, procedures  , Obtaining or reviewing history  , and Communicating with other healthcare professionals .    **Please Note: This note may have been constructed using a voice recognition system.**

## 2025-02-08 NOTE — PLAN OF CARE
Problem: Potential for Falls  Goal: Patient will remain free of falls  Description: INTERVENTIONS:  - Educate patient/family on patient safety including physical limitations  - Instruct patient to call for assistance with activity   - Consult OT/PT to assist with strengthening/mobility   - Keep Call bell within reach  - Keep bed low and locked with side rails adjusted as appropriate  - Keep care items and personal belongings within reach  - Initiate and maintain comfort rounds  - Make Fall Risk Sign visible to staff  - Apply yellow socks and bracelet for high fall risk patients  - Consider moving patient to room near nurses station  Outcome: Progressing     Problem: NEUROSENSORY - ADULT  Goal: Achieves stable or improved neurological status  Description: INTERVENTIONS  - Monitor and report changes in neurological status  - Monitor vital signs such as temperature, blood pressure, glucose, and any other labs ordered   - Initiate measures to prevent increased intracranial pressure  - Monitor for seizure activity and implement precautions if appropriate      Outcome: Progressing

## 2025-02-08 NOTE — ASSESSMENT & PLAN NOTE
UA indicating UTI  Urine culture showing gram-negative rods, await for finalization  Started on IV ceftriaxone, will continue for 5 days total - completed on 2/8

## 2025-02-08 NOTE — ASSESSMENT & PLAN NOTE
Wt Readings from Last 3 Encounters:   02/08/25 50.7 kg (111 lb 11.2 oz)   09/27/24 53 kg (116 lb 12.8 oz)   08/16/24 55.4 kg (122 lb 1.6 oz)       Appears euvolemic   Continue PO bumex 2mg daily   I/O, Daily weights  Stable

## 2025-02-09 PROCEDURE — 99232 SBSQ HOSP IP/OBS MODERATE 35: CPT | Performed by: PHYSICIAN ASSISTANT

## 2025-02-09 RX ADMIN — METOPROLOL SUCCINATE 25 MG: 25 TABLET, EXTENDED RELEASE ORAL at 08:56

## 2025-02-09 RX ADMIN — ACETAMINOPHEN 975 MG: 325 TABLET, FILM COATED ORAL at 17:00

## 2025-02-09 RX ADMIN — ESCITALOPRAM OXALATE 5 MG: 10 TABLET ORAL at 08:56

## 2025-02-09 RX ADMIN — MELATONIN 3 MG: 3 TAB ORAL at 21:25

## 2025-02-09 RX ADMIN — ATORVASTATIN CALCIUM 20 MG: 20 TABLET, FILM COATED ORAL at 17:00

## 2025-02-09 RX ADMIN — BUMETANIDE 2 MG: 1 TABLET ORAL at 08:55

## 2025-02-09 RX ADMIN — AMIODARONE HYDROCHLORIDE 100 MG: 200 TABLET ORAL at 08:56

## 2025-02-09 RX ADMIN — PRASUGREL 5 MG: 10 TABLET, FILM COATED ORAL at 08:57

## 2025-02-09 RX ADMIN — RIVAROXABAN 15 MG: 15 TABLET, FILM COATED ORAL at 09:04

## 2025-02-09 RX ADMIN — ACETAMINOPHEN 975 MG: 325 TABLET, FILM COATED ORAL at 21:25

## 2025-02-09 NOTE — ASSESSMENT & PLAN NOTE
Wt Readings from Last 3 Encounters:   02/09/25 50.4 kg (111 lb 1.8 oz)   09/27/24 53 kg (116 lb 12.8 oz)   08/16/24 55.4 kg (122 lb 1.6 oz)       Appears euvolemic   Continue PO bumex 2mg daily   I/O, Daily weights  Stable

## 2025-02-09 NOTE — PROGRESS NOTES
Progress Note - Hospitalist   Name: Verito Fallon 84 y.o. female I MRN: [id number]  Unit/Bed#: S -01 I Date of Admission: 2/4/2025   Date of Service: 2/9/2025 I Hospital Day: 4    Assessment & Plan  Metabolic encephalopathy  Patient with history of cognitive impairment presents to the ER on 302 due to concern regarding suicidal statements while she was visiting her  in the nursing home today. 302 not upheld by the ER provider as patient not currently expressing suicidal intent.   Patient's daughter states that Verito's cognition has been steadily declining for some time and has been recently exacerbated by her  being sick and placed in a nursing home. Prior to this, her  was primary caretaker. Patient has expressed suicidal intent in the past according to daughter however this has been used as a manipulative tactic.   Patient is currently oriented to self only. She currently denies SI/HI.   UA appears to be infected which may be exacerbating her confusion, urine culture shows gram-negative rods, awaiting for finalization  IV Ceftriaxone for suspected UTI   Complete 5 days of antibiotics on 2/8  Delirium precautions   Continual virtual observation, to begin to wean off the next 24 hours  Psychiatry consulted and ultimately had no further inpatient recommendations  Geriatrics consult appreciated   PT/OT-level 2 recommendations with AM-PAC score 17  Neuropsych consulted and currently determined patient has no capacity.    Will need to likely be options.    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)  Wt Readings from Last 3 Encounters:   02/09/25 50.4 kg (111 lb 1.8 oz)   09/27/24 53 kg (116 lb 12.8 oz)   08/16/24 55.4 kg (122 lb 1.6 oz)       Appears euvolemic   Continue PO bumex 2mg daily   I/O, Daily weights  Stable      A-fib (HCC)  Rate controlled   Continue Xarelto, Amiodarone, Toprol XL   PAD (peripheral artery disease) (HCC)  Continue  Prasugrel, statin   Sick sinus syndrome (HCC)  S/p PPM   CAD (coronary artery disease)  S/p CABG   Continue Prasugrel, Xarelto, BB, statin   Chronic kidney disease, stage 3b ([facility])  Lab Results   Component Value Date    EGFR 54 02/07/2025    EGFR 52 02/06/2025    EGFR 50 02/05/2025    CREATININE 0.96 02/07/2025    CREATININE 0.98 02/06/2025    CREATININE 1.01 02/05/2025     Creatinine currently stable at baseline   Monitor BMP  UTI (urinary tract infection)  UA indicating UTI  Urine culture showing gram-negative rods, await for finalization  Started on IV ceftriaxone, will continue for 5 days total - completed on 2/8   Dementia ([facility])  Alert and oriented x to self and location, however not to time or situation  High risk for delirium  Continue with delirium precautions  Encouraged avoidance of overstimulation/under stimulation of sensory organs including appropriate wake/sleep cycle, avoiding unnecessary sounds including call bells and additional monitors, ambient light, and appropriate room lighting/television usage for time of day.  Encourage use of assistive devices such as corrective lenses at all appropriate times to reduce risk of uncorrected sensory impairment from converting to isolation, confusion, encephalopathy and more precipitous cognitive decline   Constipation  Continue a bowel regimen     VTE Pharmacologic Prophylaxis: VTE Score: 4 Moderate Risk (Score 3-4) - Pharmacological DVT Prophylaxis Ordered: rivaroxaban (Xarelto).    Mobility:   Basic Mobility Inpatient Raw Score: 18  JH-HLM Goal: 6: Walk 10 steps or more  JH-HLM Achieved: 6: Walk 10 steps or more  JH-HLM Goal achieved. Continue to encourage appropriate mobility.    Patient Centered Rounds: I performed bedside rounds with nursing staff today.   Discussions with Specialists or Other Care Team Provider: Discussed with RN, CM     Education and Discussions with Family / Patient: Updated  (daughter) via phone.    Current Length of  Stay: 4 day(s)  Current Patient Status: Inpatient   Certification Statement: The patient will continue to require additional inpatient hospital stay due to awaiting safe discharge plan   Discharge Plan: Anticipate discharge in 24-48 hrs to rehab facility.    Code Status: Level 3 - DNAR and DNI    Subjective   Patient without complaint today. Denies pain. Eating and drinking well.     Objective :  Temp:  [98 °F (36.7 °C)-98.9 °F (37.2 °C)] 98.4 °F (36.9 °C)  HR:  [56-71] 70  BP: (124-139)/(51-68) 129/61  Resp:  [16-18] 16  SpO2:  [95 %-98 %] 96 %    Body mass index is 22.44 kg/m².     Input and Output Summary (last 24 hours):     Intake/Output Summary (Last 24 hours) at 2/9/2025 1206  Last data filed at 2/8/2025 2159  Gross per 24 hour   Intake 360 ml   Output --   Net 360 ml       Physical Exam  Vitals and nursing note reviewed.   Constitutional:       General: She is not in acute distress.     Appearance: Normal appearance. She is normal weight. She is not ill-appearing or diaphoretic.   HENT:      Head: Normocephalic and atraumatic.      Mouth/Throat:      Mouth: Mucous membranes are moist.   Eyes:      General: No scleral icterus.     Pupils: Pupils are equal, round, and reactive to light.   Cardiovascular:      Rate and Rhythm: Normal rate and regular rhythm.      Pulses: Normal pulses.      Heart sounds: Normal heart sounds, S1 normal and S2 normal. No murmur heard.     No systolic murmur is present.      No diastolic murmur is present.      No gallop. No S3 or S4 sounds.   Pulmonary:      Effort: Pulmonary effort is normal. No accessory muscle usage or respiratory distress.      Breath sounds: Normal breath sounds. No stridor. No decreased breath sounds, wheezing, rhonchi or rales.   Chest:      Chest wall: No tenderness.   Abdominal:      General: Bowel sounds are normal. There is no distension.      Palpations: Abdomen is soft.      Tenderness: There is no abdominal tenderness. There is no guarding.    Musculoskeletal:      Right lower leg: No edema.      Left lower leg: No edema.   Skin:     General: Skin is warm and dry.      Coloration: Skin is not jaundiced.   Neurological:      General: No focal deficit present.      Mental Status: She is alert.      Motor: No tremor or seizure activity.   Psychiatric:         Behavior: Behavior is cooperative.           Lines/Drains:              Lab Results: I have reviewed the following results:   Results from last 7 days   Lab Units 02/07/25  0632 02/06/25  0912   WBC Thousand/uL 5.82 6.92   HEMOGLOBIN g/dL 12.5 13.5   HEMATOCRIT % 37.4 41.1   PLATELETS Thousands/uL 174 193   SEGS PCT %  --  70   LYMPHO PCT %  --  16   MONO PCT %  --  8   EOS PCT %  --  5     Results from last 7 days   Lab Units 02/07/25  0632   SODIUM mmol/L 140   POTASSIUM mmol/L 3.4*   CHLORIDE mmol/L 104   CO2 mmol/L 31   BUN mg/dL 16   CREATININE mg/dL 0.96   ANION GAP mmol/L 5   CALCIUM mg/dL 8.8   GLUCOSE RANDOM mg/dL 94         Results from last 7 days   Lab Units 02/04/25  1523   POC GLUCOSE mg/dl 72               Recent Cultures (last 7 days):   Results from last 7 days   Lab Units 02/04/25  1848   URINE CULTURE  >100,000 cfu/ml Escherichia coli*       Imaging Results Review: No pertinent imaging studies reviewed.  Other Study Results Review: No additional pertinent studies reviewed.    Last 24 Hours Medication List:     Current Facility-Administered Medications:     acetaminophen (TYLENOL) tablet 650 mg, Q6H PRN    acetaminophen (TYLENOL) tablet 975 mg, TID    amiodarone tablet 100 mg, Daily    atorvastatin (LIPITOR) tablet 20 mg, Daily With Dinner    bumetanide (BUMEX) tablet 2 mg, Daily    escitalopram (LEXAPRO) tablet 5 mg, Daily    melatonin tablet 3 mg, HS    metoprolol succinate (TOPROL-XL) 24 hr tablet 25 mg, Daily    polyethylene glycol (MIRALAX) packet 17 g, Daily    prasugrel (EFFIENT) tablet 5 mg, Daily    rivaroxaban (XARELTO) tablet 15 mg, Daily With Breakfast    senna (SENOKOT)  tablet 17.2 mg, BID    Administrative Statements   Today, Patient Was Seen By: Cyril Schofield PA-C  I have spent a total time of 35 minutes in caring for this patient on the day of the visit/encounter including Diagnostic results, Instructions for management, Impressions, Counseling / Coordination of care, Documenting in the medical record, Reviewing / ordering tests, medicine, procedures  , Obtaining or reviewing history  , and Communicating with other healthcare professionals .    **Please Note: This note may have been constructed using a voice recognition system.**

## 2025-02-10 PROCEDURE — 97535 SELF CARE MNGMENT TRAINING: CPT

## 2025-02-10 PROCEDURE — 99232 SBSQ HOSP IP/OBS MODERATE 35: CPT

## 2025-02-10 RX ADMIN — BUMETANIDE 2 MG: 1 TABLET ORAL at 10:22

## 2025-02-10 RX ADMIN — ATORVASTATIN CALCIUM 20 MG: 20 TABLET, FILM COATED ORAL at 18:04

## 2025-02-10 RX ADMIN — ACETAMINOPHEN 975 MG: 325 TABLET, FILM COATED ORAL at 21:21

## 2025-02-10 RX ADMIN — AMIODARONE HYDROCHLORIDE 100 MG: 200 TABLET ORAL at 10:22

## 2025-02-10 RX ADMIN — RIVAROXABAN 15 MG: 15 TABLET, FILM COATED ORAL at 10:22

## 2025-02-10 RX ADMIN — MELATONIN 3 MG: 3 TAB ORAL at 21:21

## 2025-02-10 RX ADMIN — PRASUGREL 5 MG: 10 TABLET, FILM COATED ORAL at 10:21

## 2025-02-10 RX ADMIN — METOPROLOL SUCCINATE 25 MG: 25 TABLET, EXTENDED RELEASE ORAL at 10:22

## 2025-02-10 RX ADMIN — ESCITALOPRAM OXALATE 5 MG: 10 TABLET ORAL at 10:23

## 2025-02-10 NOTE — PROGRESS NOTES
Progress Note - Hospitalist   Name: Verito Fallon 84 y.o. female I MRN: [id number]  Unit/Bed#: S -01 I Date of Admission: 2/4/2025   Date of Service: 2/10/2025 I Hospital Day: 5    Assessment & Plan  Metabolic encephalopathy  Patient with history of cognitive impairment presents to the ER on 302 due to concern regarding suicidal statements while she was visiting her  in the nursing home today. 302 not upheld by the ER provider as patient not currently expressing suicidal intent.   Patient's daughter states that Verito's cognition has been steadily declining for some time and has been recently exacerbated by her  being sick and placed in a nursing home. Prior to this, her  was primary caretaker. Patient has expressed suicidal intent in the past according to daughter however this has been used as a manipulative tactic.   Patient is currently oriented to self only. She currently denies SI/HI.   UA appears to be infected which may be exacerbating her confusion, urine culture shows E. coli  Completed 5 days of IV Ceftriaxone for suspected UTI on 2/8  Delirium precautions   Currently off virtual observation  Psychiatry consulted and ultimately had no further inpatient recommendations  Geriatrics consult appreciated   PT/OT-level 2 recommendations with AM-PAC score 17  Neuropsych consulted and currently determined patient has no capacity.    Will need to likely be options.      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)  Wt Readings from Last 3 Encounters:   02/09/25 50.4 kg (111 lb 1.8 oz)   09/27/24 53 kg (116 lb 12.8 oz)   08/16/24 55.4 kg (122 lb 1.6 oz)       Appears euvolemic   Continue PO bumex 2mg daily   I/O, Daily weights  Stable      A-fib (HCC)  Rate controlled   Continue Xarelto, Amiodarone, Toprol XL   PAD (peripheral artery disease) (HCC)  Continue Prasugrel, statin   Sick sinus syndrome (HCC)  S/p PPM   CAD (coronary artery disease)  S/p CABG   Continue  Prasugrel, Xarelto, BB, statin   Chronic kidney disease, stage 3b ([facility])  Lab Results   Component Value Date    EGFR 54 02/07/2025    EGFR 52 02/06/2025    EGFR 50 02/05/2025    CREATININE 0.96 02/07/2025    CREATININE 0.98 02/06/2025    CREATININE 1.01 02/05/2025     Creatinine currently stable at baseline   Monitor BMP  UTI (urinary tract infection)  UA indicating UTI  Urine culture showing gram-negative rods, await for finalization  Started on IV ceftriaxone, will continue for 5 days total - completed on 2/8   Dementia ([facility])  Alert and oriented x to self and location, however not to time or situation  High risk for delirium  Continue with delirium precautions  Encouraged avoidance of overstimulation/under stimulation of sensory organs including appropriate wake/sleep cycle, avoiding unnecessary sounds including call bells and additional monitors, ambient light, and appropriate room lighting/television usage for time of day.  Encourage use of assistive devices such as corrective lenses at all appropriate times to reduce risk of uncorrected sensory impairment from converting to isolation, confusion, encephalopathy and more precipitous cognitive decline   Constipation  Continue a bowel regimen     VTE Pharmacologic Prophylaxis: VTE Score: 4 Moderate Risk (Score 3-4) - Pharmacological DVT Prophylaxis Ordered: apixaban (Eliquis).    Mobility:   Basic Mobility Inpatient Raw Score: 18  JH-HLM Goal: 6: Walk 10 steps or more  JH-HLM Achieved: 6: Walk 10 steps or more  JH-HLM Goal achieved. Continue to encourage appropriate mobility.    Patient Centered Rounds: I performed bedside rounds with nursing staff today.   Discussions with Specialists or Other Care Team Provider: CM    Education and Discussions with Family / Patient: Updated  (daughter) via phone.    Current Length of Stay: 5 day(s)  Current Patient Status: Inpatient   Certification Statement: The patient will continue to require additional inpatient  hospital stay due to safe discharge planning  Discharge Plan: Anticipate discharge in >72 hrs to rehab facility.    Code Status: Level 3 - DNAR and DNI    Subjective   Patient doing well on exam, feels as though she should not have expressed her suicidal ideations in the past.  Denies any headache, dizziness, lightheadedness, SI/HI     Objective :  Temp:  [97.9 °F (36.6 °C)-98.8 °F (37.1 °C)] 97.9 °F (36.6 °C)  HR:  [43-76] 71  BP: ()/(47-59) 121/57  Resp:  [15-16] 15  SpO2:  [94 %-96 %] 96 %  O2 Device: None (Room air)    Body mass index is 22.44 kg/m².     Input and Output Summary (last 24 hours):     Intake/Output Summary (Last 24 hours) at 2/10/2025 0903  Last data filed at 2/9/2025 2154  Gross per 24 hour   Intake 480 ml   Output --   Net 480 ml       Physical Exam  Vitals reviewed.   Constitutional:       General: She is not in acute distress.     Appearance: Normal appearance. She is not ill-appearing.   HENT:      Mouth/Throat:      Mouth: Mucous membranes are moist.   Cardiovascular:      Rate and Rhythm: Normal rate and regular rhythm.      Heart sounds: Normal heart sounds.   Pulmonary:      Effort: Pulmonary effort is normal.      Breath sounds: Normal breath sounds. No rales.   Abdominal:      Palpations: Abdomen is soft.      Tenderness: There is no abdominal tenderness.   Musculoskeletal:      Right lower leg: No edema.      Left lower leg: No edema.   Skin:     Findings: Bruising present.   Neurological:      Mental Status: She is alert.      Comments: Oriented to self and place but not to time or situation           Lines/Drains:              Lab Results: I have reviewed the following results:   Results from last 7 days   Lab Units 02/07/25  0632 02/06/25  0912   WBC Thousand/uL 5.82 6.92   HEMOGLOBIN g/dL 12.5 13.5   HEMATOCRIT % 37.4 41.1   PLATELETS Thousands/uL 174 193   SEGS PCT %  --  70   LYMPHO PCT %  --  16   MONO PCT %  --  8   EOS PCT %  --  5     Results from last 7 days   Lab  Units 02/07/25  0632   SODIUM mmol/L 140   POTASSIUM mmol/L 3.4*   CHLORIDE mmol/L 104   CO2 mmol/L 31   BUN mg/dL 16   CREATININE mg/dL 0.96   ANION GAP mmol/L 5   CALCIUM mg/dL 8.8   GLUCOSE RANDOM mg/dL 94         Results from last 7 days   Lab Units 02/04/25  1523   POC GLUCOSE mg/dl 72               Recent Cultures (last 7 days):   Results from last 7 days   Lab Units 02/04/25  1848   URINE CULTURE  >100,000 cfu/ml Escherichia coli*             Last 24 Hours Medication List:     Current Facility-Administered Medications:     acetaminophen (TYLENOL) tablet 650 mg, Q6H PRN    acetaminophen (TYLENOL) tablet 975 mg, TID    amiodarone tablet 100 mg, Daily    atorvastatin (LIPITOR) tablet 20 mg, Daily With Dinner    bumetanide (BUMEX) tablet 2 mg, Daily    escitalopram (LEXAPRO) tablet 5 mg, Daily    melatonin tablet 3 mg, HS    metoprolol succinate (TOPROL-XL) 24 hr tablet 25 mg, Daily    polyethylene glycol (MIRALAX) packet 17 g, Daily    prasugrel (EFFIENT) tablet 5 mg, Daily    rivaroxaban (XARELTO) tablet 15 mg, Daily With Breakfast    senna (SENOKOT) tablet 17.2 mg, BID    Administrative Statements   Today, Patient Was Seen By: Faina Moulton PA-C      **Please Note: This note may have been constructed using a voice recognition system.**

## 2025-02-10 NOTE — OCCUPATIONAL THERAPY NOTE
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)    A-fib (HCC)    PAD (peripheral artery disease) (HCC)    UTI (urinary tract infection)    Sick sinus syndrome (HCC)    CAD (coronary artery disease)    Dementia (HCC)    Chronic kidney disease, stage 3b (HCC)    Constipation        02/10/25 1444   OT Last Visit   OT Visit Date 02/10/25   Note Type   Note Type Treatment   Pain Assessment   Pain Assessment Tool 0-10   Pain Score No Pain   Restrictions/Precautions   Weight Bearing Precautions Per Order No   Other Precautions Cognitive;Chair Alarm;Bed Alarm;Multiple lines;Fall Risk   Lifestyle   Autonomy PTA pt living alone in 1SH, pt (I) with ADLS and IADLs, (-)falls, (+)drives no use of AD at baseline   Reciprocal Relationships supportive  - however currently at [employer]   Service to Others retired   Intrinsic Gratification spending time with    ADL   Where Assessed Standing at sink  (toilet)   Eating Assistance 6  Modified independent   Eating Deficit Setup;Beverage management   Eating Comments from bedside table   Grooming Assistance 5  Supervision/Setup   Grooming Deficit Setup;Verbal cueing;Supervision/safety;Increased time to complete;Standing with assistive device;Wash/dry hands;Brushing hair   Grooming Comments setup at sink in bathroom, A for management of knots at back of head. Limited by fatigue   UB Dressing Assistance 4  Minimal Assistance   UB Dressing Deficit Setup;Supervision/safety;Increased time to complete;Thread RUE;Thread LUE;Pull around back   UB Dressing Comments in stance at recliner, alternating unilateral support on RW, min A to bring around back   LB Dressing Assistance 4  Minimal Assistance   LB Dressing Deficit Setup;Steadying;Requires assistive device for steadying;Verbal  "cueing;Supervision/safety;Increased time to complete;Thread RLE into underwear;Thread LLE into underwear;Thread RLE into pants;Thread LLE into pants;Pull up over hips   LB Dressing Comments don underwear sitting on toilet, A to thread over RLE, S to pull over hips in stance. Additional donning pants in recliner w/ continued min A to thread over BLEs   Toileting Assistance  5  Supervision/Setup   Toileting Deficit Setup;Verbal cueing;Supervison/safety;Increased time to complete;Perineal hygiene   Toileting Comments hygiene completed sitting on toilet   Functional Standing Tolerance   Time 5-6 minutes   Activity grooming at sink   Comments S in stance, limited by fatiuge, no overt LOB   Bed Mobility   Additional Comments Pt received in recliner upon arrival, returned at end of session   Transfers   Sit to Stand 5  Supervision   Additional items Assist x 1;Armrests;Increased time required;Verbal cues   Stand to Sit 5  Supervision   Additional items Assist x 1;Armrests;Increased time required;Verbal cues   Toilet transfer 4  Minimal assistance   Additional items Assist x 1;Increased time required;Verbal cues;Standard toilet  (VC for hand placement)   Additional Comments to RW, VC for hand placement   Functional Mobility   Functional Mobility 5  Supervision  (close S)   Additional Comments Household distance w/ RW and close S; Limited by fatigue   Additional items Rolling walker   Toilet Transfers   Toilet Transfer From Rolling walker   Toilet Transfer Type To and from   Toilet Transfer to Standard toilet   Toilet Transfer Technique Ambulating   Toilet Transfers Minimal assistance;Verbal cues   Toilet Transfers Comments VC for grab bar R   Subjective   Subjective \"I just miss my , I'm used to being with him all the time\"   Cognition   Overall Cognitive Status Impaired   Arousal/Participation Alert;Cooperative   Attention Attends with cues to redirect   Orientation Level Oriented X4  (looked at newspaper to " determine current date)   Memory Decreased short term memory;Decreased recall of recent events;Decreased recall of precautions  (repetative/tangential)   Following Commands Follows one step commands without difficulty   Comments Improved cognition since last session, continues to have STM deficits, repetative at times, cueing for redirection.   Activity Tolerance   Activity Tolerance Patient limited by fatigue  (cognition)   Medical Staff Made Aware Spoke to PT OMER Koch   Assessment   Assessment Pt seen for OT treatment on 2/10 s/p admit to [facility] w/ Metabolic encephalopathy. Pt agreeable to OT treatment session, upon arrival patient was found seated OOB to Recliner. Patient participated in skilled OT interventions to address ADL tasks, functional transfers, and overall activity tolerance and participation. In comparison to previous session, Verito demonstrated improvements in global cognition, performance of ADLs/transfers/mobility and had overall improved activity tolerance this session but is continuing to perform below baseline due to decreased endurance, fatigue after light activity and cognitive decline impacting independence/safety of ADLs. Personal factors that continue to impact DC include: advanced age, current habits and behavioral patterns, limited social support, inaccessible home environment, inaccessible bathroom environment, inaccessibility to community, and difficulty completing ADLs. Patient to benefit from continued IPOT treatment to address deficits as defined above and maximize level of functional independence with ADLs and functional mobility. Goals remain appropriate. Continue per POC.   Plan   Treatment Interventions ADL retraining;Functional transfer training;Endurance training;Cognitive reorientation;Patient/family training;Equipment evaluation/education;Compensatory technique education;Continued evaluation;Energy conservation;Activityengagement   Goal Expiration Date 02/15/25   OT  Treatment Day 1   OT Frequency 3-5x/wk   Discharge Recommendation   Rehab Resource Intensity Level, OT II (Moderate Resource Intensity)  (anticipate pt may progress to level III pending level of social support available upon DC.)   Equipment Recommended Bedside commode   Commode Type Standard   Additional Comments  The patient's raw score on the -PAC Daily Activity Inpatient Short Form is 18. A raw score of less than 19 suggests the patient may benefit from discharge to post-acute rehabilitation services. Please refer to the recommendation of the Occupational Therapist for safe discharge planning.   AM-PAC Daily Activity Inpatient   Lower Body Dressing 3   Bathing 2   Toileting 3   Upper Body Dressing 3   Grooming 3   Eating 4   Daily Activity Raw Score 18   Daily Activity Standardized Score (Calc for Raw Score >=11) 38.66   AM-PAC Applied Cognition Inpatient   Following a Speech/Presentation 2   Understanding Ordinary Conversation 3   Taking Medications 2   Remembering Where Things Are Placed or Put Away 3   Remembering List of 4-5 Errands 2   Taking Care of Complicated Tasks 2   Applied Cognition Raw Score 14   Applied Cognition Standardized Score 32.02   End of Consult   Patient Position at End of Consult Bedside chair;Bed/Chair alarm activated;All needs within reach   Nurse Communication Nurse aware of consult     GOALS:         -Patient will perform grooming tasks standing at sink with overall mod I in order to increase overall independence (PROGRESSING)     -Patient will be Mod I with UB dressing using AE and AD as needed in order to increase (I) with ADLs (PROGRESSING)     -Patient will be Mod I with UB bathing using AE and AD as needed in order to increase (I) with ADLs     -Patient will be Mod I with LB dressing with use of AE and AD as needed in order to increase (I) with ADLs (PROGRESSING)     -Patient will be Mod I with LB bathing with use of AE and AD as needed in order to increase (I) with ADLs      -Patient will complete toileting w/ Mod I w/ G hygiene/thoroughness in order to reduce caregiver burden (PROGRESSING)      -Patient will demonstrate Mod I with bed mobility for ability to manage own comfort and initiate OOB tasks.       -Patient will perform functional transfers with Mod I to/from all surfaces using DME as needed in order to increase (I) with functional tasks (PROGRESSING)     -Patient will be Mod I with functional mobility to/from bathroom for increased independence with toileting tasks (PROGRESSING)     -Patient will engage in ongoing cognitive assessment in order to assist with safe discharge planning/recommendations. RECOMMEND ACLS     -Patient will attend to functional task for 10 min without VC for attention/redirection  (PROGRESSING)    WALLY Rincon, OTR/L  [place] License [id number]  [place] License [id number]

## 2025-02-10 NOTE — PLAN OF CARE
Problem: Potential for Falls  Goal: Patient will remain free of falls  Description: INTERVENTIONS:  - Educate patient/family on patient safety including physical limitations  - Instruct patient to call for assistance with activity   - Consult OT/PT to assist with strengthening/mobility   - Keep Call bell within reach  - Keep bed low and locked with side rails adjusted as appropriate  - Keep care items and personal belongings within reach  - Initiate and maintain comfort rounds  - Make Fall Risk Sign visible to staff  - Offer Toileting every 2 Hours, in advance of need  - Initiate/Maintain bed alarm  - Obtain necessary fall risk management equipment: walker  - Apply yellow socks and bracelet for high fall risk patients  - Consider moving patient to room near nurses station  Outcome: Progressing     Problem: NEUROSENSORY - ADULT  Goal: Achieves stable or improved neurological status  Description: INTERVENTIONS  - Monitor and report changes in neurological status  - Monitor vital signs such as temperature, blood pressure, glucose, and any other labs ordered   - Initiate measures to prevent increased intracranial pressure  - Monitor for seizure activity and implement precautions if appropriate      Outcome: Progressing  Goal: Achieves maximal functionality and self care  Description: INTERVENTIONS  - Monitor swallowing and airway patency with patient fatigue and changes in neurological status  - Encourage and assist patient to increase activity and self care.   - Encourage visually impaired, hearing impaired and aphasic patients to use assistive/communication devices  Outcome: Progressing

## 2025-02-10 NOTE — CASE MANAGEMENT
Case Management Assessment & Discharge Planning Note    Patient name Verito Fallon  Location S /S -01 MRN [id number]  : 1940 Date 2/10/2025       Current Admission Date: 2025  Current Admission Diagnosis:Metabolic encephalopathy   Patient Active Problem List    Diagnosis Date Noted Date Diagnosed    Constipation 2025     Acute on chronic combined systolic and diastolic congestive heart failure (HCC) 2024     Chronic kidney disease, stage 3b ([facility]) 2024     Osteoporosis 2024     Presence of permanent cardiac pacemaker 2024     Hypokalemia 2023     History of seizure 12/15/2023     At risk for constipation 12/15/2023     At risk for delirium 12/15/2023     Advance care planning 12/15/2023     Hyperkalemia 12/15/2023     Leg hematoma, right, subsequent encounter 2023     Pruritic rash 2023     Cervical spondylosis      Cervical disc disorder with radiculopathy of mid-cervical region      Atherosclerosis with claudication of extremity ([facility]) 2021     Carotid stenosis, asymptomatic, bilateral 2021     Dementia ([facility]) 2021     Current use of long term anticoagulation 2021     Long term current use of amiodarone 2021     Renal artery stenosis ([facility]) 2021     Pulmonary hypertension ([facility]) 2021     Sick sinus syndrome ([facility]) 2021     Hx of CABG 2021     CAD (coronary artery disease) 2021     Depression 2021     UTI (urinary tract infection) 2021     Hyperlipidemia 2021     Metabolic encephalopathy 2021     A-fib ([facility]) 2021     PAD (peripheral artery disease) ([facility]) 2021     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[facility]) 2021     Hypertensive heart and 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 ([facility]) 2017       LOS (days): 5  Geometric Mean LOS (GMLOS) (days): 3.8  Days to GMLOS:-1.2     OBJECTIVE:    Risk of Unplanned  Readmission Score: 12.24         Current admission status: Inpatient       Preferred Pharmacy:   [id number] [place] - [address]  [address] [address]  Phone: [phone] Fax: [phone]    [facility] - [place] - [address]  [address] [address]  Phone: [phone] Fax: [phone]    Primary Care Provider: Robbie Warner DO    Primary Insurance: MEDICARE  Secondary Insurance: AETNA    ASSESSMENT:  Active Health Care Proxies       Leander Fallon Health Care Representative - Spouse   Primary Phone: [phone] (Mobile)  Home Phone: [phone]                           Readmission Root Cause  30 Day Readmission: No    Patient Information  Admitted from:: Home  Mental Status: Confused  During Assessment patient was accompanied by: Daughter  Assessment information provided by:: Daughter  Primary Caregiver: Self  Support Systems: Self, Spouse/significant other, Family members  County of Residence: [place]  What city do you live in?: [place]  Home entry access options. Select all that apply.: Stairs  Number of steps to enter home.: 3  Do the steps have railings?: Yes  Type of Current Residence: [facility]ated [facility]  Living Arrangements: Lives Alone  Is patient a ?: No    Activities of Daily Living Prior to Admission  Functional Status: Independent  Completes ADLs independently?: Yes  Ambulates independently?: Yes  Does patient use assisted devices?: Yes  Assisted Devices (DME) used: Walker, Straight Cane  Does patient currently own DME?: Yes  What DME does the patient currently own?: Straight Cane, Walker (Built in shower seat)  Does patient have a history of Outpatient Therapy (PT/OT)?: Yes  Does the patient have a history of Short-Term Rehab?: Yes ([facility])  Does patient have a history of HHC?: Yes  Does patient currently have HHC?: No         Patient Information Continued  Income Source: SSI/SSD  Does patient have prescription coverage?:  Yes  Does patient receive dialysis treatments?: No  Does patient have a history of substance abuse?: No  Does patient have a history of Mental Health Diagnosis?: Yes (Depression)  Is patient receiving treatment for mental health?: Yes  Has patient received inpatient treatment related to mental health in the last 2 years?: Yes         Means of Transportation  Means of Transport to Appts:: Family transport          DISCHARGE DETAILS:    Discharge planning discussed with:: Melyssa San (Daughter)  Freedom of Choice: Yes  Comments - Freedom of Choice: Reviewed recommendation for STR at DC and need to be optioned due to SI  CM contacted family/caregiver?: Yes  Were Treatment Team discharge recommendations reviewed with patient/caregiver?: Yes  Did patient/caregiver verbalize understanding of patient care needs?: N/A- going to facility  Were patient/caregiver advised of the risks associated with not following Treatment Team discharge recommendations?: Yes    Contacts  Patient Contacts: Melyssa San (Daughter)  Relationship to Patient:: Family  Contact Method: In Person  Reason/Outcome: Discharge Planning, Emergency Contact, Continuity of Care    Requested Home Health Care         Is the patient interested in HHC at discharge?: No    DME Referral Provided  Referral made for DME?: No    Other Referral/Resources/Interventions Provided:  Interventions: Short Term Rehab         Treatment Team Recommendation: Short Term Rehab  Discharge Destination Plan:: Short Term Rehab                                            CM met with patient's daughter today outside of patient's room.  CM name and role reviewed.  CM assessment completed and charted above.    CM discussed that blanket STR referrals were pending however we still needed to get the paperwork back form the county for the optioning before we could move forward with STR placement.    Daughter verbalized understanding.    CM reviewed discharge planning process  including the following: identifying caregivers at home, preference for d/c planning needs, [facility] Meds to Bed program, availability of treatment team to discuss questions or concerns patient and/or family may have regarding diagnosis, plan of care, old or new medications and discharge planning.  CM will continue to follow for care coordination and update assessment as necessary.

## 2025-02-10 NOTE — ASSESSMENT & PLAN NOTE
Patient with history of cognitive impairment presents to the ER on 302 due to concern regarding suicidal statements while she was visiting her  in the nursing home today. 302 not upheld by the ER provider as patient not currently expressing suicidal intent.   Patient's daughter states that Verito's cognition has been steadily declining for some time and has been recently exacerbated by her  being sick and placed in a nursing home. Prior to this, her  was primary caretaker. Patient has expressed suicidal intent in the past according to daughter however this has been used as a manipulative tactic.   Patient is currently oriented to self only. She currently denies SI/HI.   UA appears to be infected which may be exacerbating her confusion, urine culture shows E. coli  Completed 5 days of IV Ceftriaxone for suspected UTI on 2/8  Delirium precautions   Currently off virtual observation  Psychiatry consulted and ultimately had no further inpatient recommendations  Geriatrics consult appreciated   PT/OT-level 2 recommendations with AM-PAC score 17  Neuropsych consulted and currently determined patient has no capacity.    Will need to likely be options.

## 2025-02-10 NOTE — PLAN OF CARE
Problem: OCCUPATIONAL THERAPY ADULT  Goal: Performs self-care activities at highest level of function for planned discharge setting.  See evaluation for individualized goals.  Description: Treatment Interventions: ADL retraining, Functional transfer training, Endurance training, Cognitive reorientation, Patient/family training, Equipment evaluation/education, Compensatory technique education, Energy conservation, Activityengagement          See flowsheet documentation for full assessment, interventions and recommendations.   Outcome: Progressing  Note: Limitation: Decreased ADL status, Decreased Safe judgement during ADL, Decreased cognition, Decreased endurance, Decreased self-care trans, Decreased high-level ADLs (impaired balance, fxnl mobility, act tor, fxnl reach, standing tor, strength, attention to task, safety awareness, insight, pacing, problem solving, learning new tasks)  Prognosis: Good  Assessment: Pt seen for OT treatment on 2/10 s/p admit to [facility] w/ Metabolic encephalopathy. Pt agreeable to OT treatment session, upon arrival patient was found seated OOB to Recliner. Patient participated in skilled OT interventions to address ADL tasks, functional transfers, and overall activity tolerance and participation. In comparison to previous session, Verito demonstrated improvements in global cognition, performance of ADLs/transfers/mobility and had overall improved activity tolerance this session but is continuing to perform below baseline due to decreased endurance, fatigue after light activity and cognitive decline impacting independence/safety of ADLs. Personal factors that continue to impact DC include: advanced age, current habits and behavioral patterns, limited social support, inaccessible home environment, inaccessible bathroom environment, inaccessibility to community, and difficulty completing ADLs. Patient to benefit from continued IPOT treatment to address deficits as defined above and maximize  level of functional independence with ADLs and functional mobility. Goals remain appropriate. Continue per POC.     Rehab Resource Intensity Level, OT: II (Moderate Resource Intensity) (anticipate pt may progress to level III pending level of social support available upon DC.)     Toyin Lopez OTD, OTR/L  [place] License [id number]  [place] License [id number]

## 2025-02-11 PROCEDURE — 99232 SBSQ HOSP IP/OBS MODERATE 35: CPT

## 2025-02-11 PROCEDURE — 99232 SBSQ HOSP IP/OBS MODERATE 35: CPT | Performed by: FAMILY MEDICINE

## 2025-02-11 RX ADMIN — ESCITALOPRAM OXALATE 5 MG: 10 TABLET ORAL at 08:40

## 2025-02-11 RX ADMIN — BUMETANIDE 2 MG: 1 TABLET ORAL at 08:40

## 2025-02-11 RX ADMIN — ACETAMINOPHEN 975 MG: 325 TABLET, FILM COATED ORAL at 08:40

## 2025-02-11 RX ADMIN — MELATONIN 3 MG: 3 TAB ORAL at 20:44

## 2025-02-11 RX ADMIN — AMIODARONE HYDROCHLORIDE 100 MG: 200 TABLET ORAL at 08:40

## 2025-02-11 RX ADMIN — ATORVASTATIN CALCIUM 20 MG: 20 TABLET, FILM COATED ORAL at 17:13

## 2025-02-11 RX ADMIN — RIVAROXABAN 15 MG: 15 TABLET, FILM COATED ORAL at 08:44

## 2025-02-11 RX ADMIN — METOPROLOL SUCCINATE 25 MG: 25 TABLET, EXTENDED RELEASE ORAL at 08:41

## 2025-02-11 RX ADMIN — ACETAMINOPHEN 975 MG: 325 TABLET, FILM COATED ORAL at 17:13

## 2025-02-11 RX ADMIN — PRASUGREL 5 MG: 10 TABLET, FILM COATED ORAL at 08:41

## 2025-02-11 RX ADMIN — SENNOSIDES 17.2 MG: 8.6 TABLET ORAL at 08:40

## 2025-02-11 RX ADMIN — ACETAMINOPHEN 975 MG: 325 TABLET, FILM COATED ORAL at 20:44

## 2025-02-11 NOTE — ASSESSMENT & PLAN NOTE
Improved, I believe she is close to baseline currently   Patient is high risk of delirium due to dementia, hospitalization, and metabolic imbalance, UTI  Initiate delirium precautions  maintain normal sleep/wake cycle  minimize overnight interruptions, group overnight vitals/labs/nursing checks as possible  dim lights, close blinds and turn off tv to minimize stimulation and encourage sleep environment in evenings  ensure that pain is well controlled consider Tylenol 975mg Q8H scheduled   monitor for fecal and urinary retention which may precipitate delirium  encourage early mobilization and ambulation  provide frequent reorientation and redirection  encourage family and friends at the bedside to help calm patient if anxious  avoid medications which may precipitate or worsen delirium such as tramadol, benzodiazepine, anticholinergics, and antihistaminics  encourage hydration and nutrition , assist with feeding if needed  redirect unwanted behaviors as first line, avoid physical restraints.  Patient currently AAO x 2 and confused as to where her  is  Completed 5 days of IV ceftriaxone  Monitor for urinary retention  Zyprexa discontinued

## 2025-02-11 NOTE — CASE MANAGEMENT
Case Management Progress Note    Patient name Verito Fallon  Location S /S -01 MRN [id number]  : 1940 Date 2025       LOS (days): 6  Geometric Mean LOS (GMLOS) (days): 3.8  Days to GMLOS:-2.1        OBJECTIVE:        Current admission status: Inpatient  Preferred Pharmacy:   [id number] [place] [place] [facility] [address]  [address] [address]  Phone: [phone] Fax: [phone]    [facility] [address]  [address] [address]  Phone: [phone] Fax: [phone]    Primary Care Provider: Robbie Warner DO    Primary Insurance: MEDICARE  Secondary Insurance: AETNA    PROGRESS NOTE:      CM received a call back from Eric at [facility] who confirmed that the  for patient's LOC and optioning is [facility].  CM was connected to her extension and a detailed VM was left requesting an update and call back.    CM department will continue to follow to assist with discharge coordination.

## 2025-02-11 NOTE — ASSESSMENT & PLAN NOTE
In 2021 patient scored 18/30 on MoCA  CT head shows moderate microangiopathic changes   Mini Cog 3/5 during this hospitalization  Patient alert to person and place on exam  Pt. Lives at home by herself-would recommend placement  Independent for instrumental ADLs. She used to have a home health aide, but recently fired her.  Independent for ADLs  No behaviors  TSH 3.005  Recommend checking B12  [place] to person, place, time, and situation as needed  Monitor for behaviors   Monitor for mental status change  Provide supportive care   Sertraline was switched to Lexapro  Continue delirium precautions as above

## 2025-02-11 NOTE — PLAN OF CARE
Problem: Potential for Falls  Goal: Patient will remain free of falls  Description: INTERVENTIONS:  - Educate patient/family on patient safety including physical limitations  - Instruct patient to call for assistance with activity   - Consult OT/PT to assist with strengthening/mobility   - Keep Call bell within reach  - Keep bed low and locked with side rails adjusted as appropriate  - Keep care items and personal belongings within reach  - Initiate and maintain comfort rounds  - Make Fall Risk Sign visible to staff  - Offer Toileting every 2 Hours, in advance of need  - Initiate/Maintain bed/chair alarm  - Obtain necessary fall risk management equipment:   - Apply yellow socks and bracelet for high fall risk patients  - Consider moving patient to room near nurses station  Outcome: Progressing     Problem: NEUROSENSORY - ADULT  Goal: Achieves stable or improved neurological status  Description: INTERVENTIONS  - Monitor and report changes in neurological status  - Monitor vital signs such as temperature, blood pressure, glucose, and any other labs ordered   - Initiate measures to prevent increased intracranial pressure  - Monitor for seizure activity and implement precautions if appropriate      Outcome: Progressing  Goal: Remains free of injury related to seizures activity  Description: INTERVENTIONS  - Maintain airway, patient safety  and administer oxygen as ordered  - Monitor patient for seizure activity, document and report duration and description of seizure to physician/advanced practitioner  - If seizure occurs,  ensure patient safety during seizure  - Reorient patient post seizure  - Seizure pads on all 4 side rails  - Instruct patient/family to notify RN of any seizure activity including if an aura is experienced  - Instruct patient/family to call for assistance with activity based on nursing assessment  - Administer anti-seizure medications if ordered    Outcome: Progressing  Goal: Achieves maximal  functionality and self care  Description: INTERVENTIONS  - Monitor swallowing and airway patency with patient fatigue and changes in neurological status  - Encourage and assist patient to increase activity and self care.   - Encourage visually impaired, hearing impaired and aphasic patients to use assistive/communication devices  Outcome: Progressing

## 2025-02-11 NOTE — PROGRESS NOTES
Progress Note - Geriatric Medicine   Name: Verito Fallon 84 y.o. female I MRN: [id number]  Unit/Bed#: S -01 I Date of Admission: 2/4/2025   Date of Service: 2/11/2025 I Hospital Day: 6     Assessment & Plan  Metabolic encephalopathy  Improved, I believe she is close to baseline currently   Patient is high risk of delirium due to dementia, hospitalization, and metabolic imbalance, UTI  Initiate delirium precautions  maintain normal sleep/wake cycle  minimize overnight interruptions, group overnight vitals/labs/nursing checks as possible  dim lights, close blinds and turn off tv to minimize stimulation and encourage sleep environment in evenings  ensure that pain is well controlled consider Tylenol 975mg Q8H scheduled   monitor for fecal and urinary retention which may precipitate delirium  encourage early mobilization and ambulation  provide frequent reorientation and redirection  encourage family and friends at the bedside to help calm patient if anxious  avoid medications which may precipitate or worsen delirium such as tramadol, benzodiazepine, anticholinergics, and antihistaminics  encourage hydration and nutrition , assist with feeding if needed  redirect unwanted behaviors as first line, avoid physical restraints.  Patient currently AAO x 2 and confused as to where her  is  Completed 5 days of IV ceftriaxone  Monitor for urinary retention  Zyprexa discontinued  UTI (urinary tract infection)  Monitor for urinary retention-bladder scan ordered  Further management per primary  Dementia (HCC)   In 2021 patient scored 18/30 on MoCA  CT head shows moderate microangiopathic changes   Mini Cog 3/5 during this hospitalization  Patient alert to person and place on exam  Pt. Lives at home by herself-would recommend placement  Independent for instrumental ADLs. She used to have a home health aide, but recently fired her.  Independent for ADLs  No behaviors  TSH 3.005  Recommend checking B12  [place] to person,  place, time, and situation as needed  Monitor for behaviors   Monitor for mental status change  Provide supportive care   Sertraline was switched to Lexapro  Continue delirium precautions as above  Constipation  Patient reports having a bowel movement this afternoon on exam today, none documented  Last BM documente was on 2/08  Is currently on MiraLax 17 g and senna 12.2 tab, 2 tabs BID  Encourage adequate p.o. Hydration  Encourage prune juice as tolerated  Encourage ambulation as tolerated.    Subjective:   84-year-old female presents sitting comfortably in bedside recliner.  Patient is AAO x 2.  Patient denies any pain including chest pain, abdominal pain, and generalized bodyaches.  She states that she slept well last night.  She ate breakfast and lunch this morning.  She states that she never skips a meal.  She had a bowel movement today.  Patient asked why she was in the hospital and where her  is.  She wishes that if she must be placed in a facility that she be placed with her .    Review of Systems   Constitutional:  Negative for chills and fever.   HENT:  Negative for rhinorrhea and sore throat.    Eyes:  Negative for discharge and visual disturbance.   Respiratory:  Negative for cough and shortness of breath.    Cardiovascular:  Negative for chest pain.   Gastrointestinal:  Negative for abdominal pain.   Genitourinary:  Negative for dysuria.   Musculoskeletal:  Negative for arthralgias and back pain.   Skin:  Negative for color change and rash.   Neurological:  Negative for dizziness and headaches.   Psychiatric/Behavioral:  Negative for agitation.    All other systems reviewed and are negative.        Objective:     Vitals: Blood pressure 120/59, pulse 73, temperature 98.6 °F (37 °C), resp. rate 17, weight 51.4 kg (113 lb 5.1 oz), SpO2 95%, not currently breastfeeding.,Body mass index is 22.89 kg/m².      Intake/Output Summary (Last 24 hours) at 2/11/2025 1350  Last data filed at 2/11/2025  1223  Gross per 24 hour   Intake 472 ml   Output --   Net 472 ml       Current Medications: Reviewed    Physical Exam:   Physical Exam  Vitals and nursing note reviewed.   Constitutional:       General: She is not in acute distress.     Appearance: She is well-developed.   HENT:      Head: Normocephalic and atraumatic.      Right Ear: External ear normal.      Left Ear: External ear normal.      Mouth/Throat:      Mouth: Mucous membranes are moist.      Pharynx: Oropharynx is clear.   Eyes:      Conjunctiva/sclera: Conjunctivae normal.   Cardiovascular:      Rate and Rhythm: Normal rate and regular rhythm.      Pulses: Normal pulses.      Heart sounds: Normal heart sounds. No murmur heard.  Pulmonary:      Effort: Pulmonary effort is normal. No respiratory distress.      Breath sounds: Normal breath sounds.   Abdominal:      General: Bowel sounds are normal.      Palpations: Abdomen is soft.      Tenderness: There is no abdominal tenderness.   Musculoskeletal:         General: No swelling.      Cervical back: Neck supple.      Right lower leg: Edema present.      Left lower leg: Edema present.   Skin:     General: Skin is warm and dry.      Capillary Refill: Capillary refill takes less than 2 seconds.      Findings: Bruising present.   Neurological:      Mental Status: She is alert. Mental status is at baseline.      Comments: AAO x 2   Psychiatric:         Mood and Affect: Mood normal.          Invasive Devices       Peripheral Intravenous Line  Duration             Peripheral IV 02/08/25 Proximal;Right;Ventral (anterior) Forearm 2 days                    Lab, Imaging and other studies: Results Review Statement: I reviewed radiology reports from this admission including: CT head.

## 2025-02-11 NOTE — ASSESSMENT & PLAN NOTE
Wt Readings from Last 3 Encounters:   02/11/25 51.4 kg (113 lb 5.1 oz)   09/27/24 53 kg (116 lb 12.8 oz)   08/16/24 55.4 kg (122 lb 1.6 oz)   Appears euvolemic   Continue PO bumex 2mg daily   I/O, Daily weights  Stable

## 2025-02-11 NOTE — CASE MANAGEMENT
"   Case Management Progress Note    Patient name Verito Fallon  Location S /S -01 MRN [id number]  : 1940 Date 2025       LOS (days): 6  Geometric Mean LOS (GMLOS) (days): 3.8  Days to GMLOS:-2.2        OBJECTIVE:        Current admission status: Inpatient  Preferred Pharmacy:   [id number] [place], [place] - [address]  [address] [address]  Phone: [phone] Fax: [phone]    [facility] [address]  [address] [address]  Phone: [phone] Fax: [phone]    Primary Care Provider: Robbie Warner DO    Primary Insurance: MEDICARE  Secondary Insurance: AETNA    PROGRESS NOTE:    Weekly Care Management Length of Stay Review     Current LOS: 6 Days    Most Recent Labs:   No results found for: \"WBC\", \"HGB\", \"HCT\", \"PLT\", \"BANDSPCT\", \"SODIUM\", \"K\", \"CL\", \"CO2\", \"BUN\", \"CREATININE\", \"GLUC\", \"ALKPHOS\", \"ALT\", \"AST\", \"AMMONIA\", \"ALB\", \"TBILI\", \"LIPASE\", \"CHOLESTEROL\", \"HDL\", \"LDLCALC\", \"TRIG\", \"HGBA1C\", \"TROPONINI\", \"INR\"    Most Recent Vitals:   Vitals:    25 0729   BP: 120/59   Pulse: 73   Resp: 17   Temp:    SpO2: 95%        Identified Barriers to Discharge/Discharge Goals/Care Management Interventions:  metabolic encephalopathy, medically stable    Intended Discharge Disposition: Options process started. SNF referrals pending.     Expected Discharge Date:  TBD      "

## 2025-02-11 NOTE — ASSESSMENT & PLAN NOTE
Patient reports having a bowel movement this afternoon on exam today, none documented  Last BM documente was on 2/08  Is currently on MiraLax 17 g and senna 12.2 tab, 2 tabs BID  Encourage adequate p.o. Hydration  Encourage prune juice as tolerated  Encourage ambulation as tolerated.

## 2025-02-11 NOTE — PLAN OF CARE
Problem: NEUROSENSORY - ADULT  Goal: Achieves stable or improved neurological status  Description: INTERVENTIONS  - Monitor and report changes in neurological status  - Monitor vital signs such as temperature, blood pressure, glucose, and any other labs ordered   - Initiate measures to prevent increased intracranial pressure  - Monitor for seizure activity and implement precautions if appropriate      Outcome: Progressing  Goal: Achieves maximal functionality and self care  Description: INTERVENTIONS  - Monitor swallowing and airway patency with patient fatigue and changes in neurological status  - Encourage and assist patient to increase activity and self care.   - Encourage visually impaired, hearing impaired and aphasic patients to use assistive/communication devices  Outcome: Progressing     Problem: Potential for Falls  Goal: Patient will remain free of falls  Description: INTERVENTIONS:  - Educate patient/family on patient safety including physical limitations  - Instruct patient to call for assistance with activity   - Consult OT/PT to assist with strengthening/mobility   - Keep Call bell within reach  - Keep bed low and locked with side rails adjusted as appropriate  - Keep care items and personal belongings within reach  - Initiate and maintain comfort rounds  - Make Fall Risk Sign visible to staff  - Offer Toileting every 2 Hours, in advance of need  - Initiate/Maintain bed alarm  - Obtain necessary fall risk management equipment: walker  - Apply yellow socks and bracelet for high fall risk patients  - Consider moving patient to room near nurses station  Outcome: Progressing

## 2025-02-11 NOTE — PROGRESS NOTES
Progress Note - Hospitalist   Name: Verito Fallon 84 y.o. female I MRN: [id number]  Unit/Bed#: S -01 I Date of Admission: 2/4/2025   Date of Service: 2/11/2025 I Hospital Day: 6    Assessment & Plan  Metabolic encephalopathy  Patient with history of cognitive impairment presents to the ER on 302 due to concern regarding suicidal statements while she was visiting her  in the nursing home today. 302 not upheld by the ER provider as patient not currently expressing suicidal intent.   Patient's daughter states that Verito's cognition has been steadily declining for some time and has been recently exacerbated by her  being sick and placed in a nursing home. Prior to this, her  was primary caretaker. Patient has expressed suicidal intent in the past according to daughter however this has been used as a manipulative tactic.   Patient is currently oriented to self only. She currently denies SI/HI.   UA appears to be infected which may be exacerbating her confusion, urine culture shows E. coli  Completed 5 days of IV Ceftriaxone for suspected UTI on 2/8  Delirium precautions   Currently off virtual observation  Psychiatry consulted and ultimately had no further inpatient recommendations  Geriatrics consult appreciated   PT/OT-level 2 recommendations with AM-PAC score 17  Neuropsych consulted and currently determined patient has no capacity.    Will need to likely be options.      Dementia (HCC)  Alert and oriented x to self and location, however not to time or situation  High risk for delirium  Continue with delirium precautions  Encouraged avoidance of overstimulation/under stimulation of sensory organs including appropriate wake/sleep cycle, avoiding unnecessary sounds including call bells and additional monitors, ambient light, and appropriate room lighting/television usage for time of day.  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[facility])  Wt Readings from Last 3 Encounters:   02/11/25 51.4 kg (113 lb 5.1 oz)   09/27/24 53 kg (116 lb 12.8 oz)   08/16/24 55.4 kg (122 lb 1.6 oz)   Appears euvolemic   Continue PO bumex 2mg daily   I/O, Daily weights  Stable  A-fib ([facility])  Rate controlled   Continue Xarelto, Amiodarone, Toprol XL   PAD (peripheral artery disease) ([facility])  Continue Prasugrel, statin   Sick sinus syndrome ([facility])  S/p PPM   CAD (coronary artery disease)  S/p CABG   Continue Prasugrel, Xarelto, BB, statin   Chronic kidney disease, stage 3b ([facility])  Lab Results   Component Value Date    EGFR 54 02/07/2025    EGFR 52 02/06/2025    EGFR 50 02/05/2025    CREATININE 0.96 02/07/2025    CREATININE 0.98 02/06/2025    CREATININE 1.01 02/05/2025     Creatinine currently stable at baseline   Monitor BMP  UTI (urinary tract infection)  UA indicating UTI  Urine culture showing gram-negative rods, await for finalization  Started on IV ceftriaxone, will continue for 5 days total - completed on 2/8   Constipation  Continue a bowel regimen     VTE Pharmacologic Prophylaxis: VTE Score: 4 Moderate Risk (Score 3-4) - Pharmacological DVT Prophylaxis Ordered: rivaroxaban (Xarelto).    Mobility:   Basic Mobility Inpatient Raw Score: 18  JH-HLM Goal: 6: Walk 10 steps or more  JH-HLM Achieved: 4: Move to chair/commode  JH-HLM Goal NOT achieved. Continue with multidisciplinary rounding and encourage appropriate mobility to improve upon JH-HLM goals.    Patient Centered Rounds: I performed bedside rounds with nursing staff today.   Discussions with Specialists or Other Care Team Provider: CM    Education and Discussions with Family / Patient: Updated  (daughter) via phone.    Current Length of Stay: 6 day(s)  Current Patient Status: Inpatient   Certification Statement: The  patient will continue to require additional inpatient hospital stay due to safe discharge planning  Discharge Plan: Anticipate discharge in 24-48 hrs to rehab facility.    Code Status: Level 3 - DNAR and DNI    Subjective   Patient doing well on exam, still confused as to why she is in the hospital does not remember the events prior to admission.  Currently denying any headache, fever, chills, abdominal pain.  Eating well sleeping well.    Objective :  Temp:  [98.6 °F (37 °C)] 98.6 °F (37 °C)  HR:  [73-75] 73  BP: (120-143)/(59-66) 120/59  Resp:  [12-17] 17  SpO2:  [95 %] 95 %    Body mass index is 22.89 kg/m².     Input and Output Summary (last 24 hours):     Intake/Output Summary (Last 24 hours) at 2/11/2025 0909  Last data filed at 2/11/2025 0822  Gross per 24 hour   Intake 295 ml   Output --   Net 295 ml       Physical Exam  Vitals reviewed.   Constitutional:       Appearance: Normal appearance.   Cardiovascular:      Rate and Rhythm: Normal rate and regular rhythm.      Heart sounds: Normal heart sounds.   Pulmonary:      Effort: Pulmonary effort is normal.      Breath sounds: Normal breath sounds. No rales.   Abdominal:      Palpations: Abdomen is soft.      Tenderness: There is no abdominal tenderness.   Musculoskeletal:      Right lower leg: Edema present.      Left lower leg: Edema present.   Skin:     General: Skin is warm and dry.      Findings: Bruising present.   Neurological:      Mental Status: She is alert. Mental status is at baseline.      Comments: Oriented to self and time but poor cognition on situation           Lines/Drains:              Lab Results: I have reviewed the following results:   Results from last 7 days   Lab Units 02/07/25  0632 02/06/25  0912   WBC Thousand/uL 5.82 6.92   HEMOGLOBIN g/dL 12.5 13.5   HEMATOCRIT % 37.4 41.1   PLATELETS Thousands/uL 174 193   SEGS PCT %  --  70   LYMPHO PCT %  --  16   MONO PCT %  --  8   EOS PCT %  --  5     Results from last 7 days   Lab Units  02/07/25  0632   SODIUM mmol/L 140   POTASSIUM mmol/L 3.4*   CHLORIDE mmol/L 104   CO2 mmol/L 31   BUN mg/dL 16   CREATININE mg/dL 0.96   ANION GAP mmol/L 5   CALCIUM mg/dL 8.8   GLUCOSE RANDOM mg/dL 94         Results from last 7 days   Lab Units 02/04/25  1523   POC GLUCOSE mg/dl 72               Recent Cultures (last 7 days):   Results from last 7 days   Lab Units 02/04/25  1848   URINE CULTURE  >100,000 cfu/ml Escherichia coli*             Last 24 Hours Medication List:     Current Facility-Administered Medications:     acetaminophen (TYLENOL) tablet 650 mg, Q6H PRN    acetaminophen (TYLENOL) tablet 975 mg, TID    amiodarone tablet 100 mg, Daily    atorvastatin (LIPITOR) tablet 20 mg, Daily With Dinner    bumetanide (BUMEX) tablet 2 mg, Daily    escitalopram (LEXAPRO) tablet 5 mg, Daily    melatonin tablet 3 mg, HS    metoprolol succinate (TOPROL-XL) 24 hr tablet 25 mg, Daily    polyethylene glycol (MIRALAX) packet 17 g, Daily    prasugrel (EFFIENT) tablet 5 mg, Daily    rivaroxaban (XARELTO) tablet 15 mg, Daily With Breakfast    senna (SENOKOT) tablet 17.2 mg, BID    Administrative Statements   Today, Patient Was Seen By: Faina Moulton PA-C      **Please Note: This note may have been constructed using a voice recognition system.**

## 2025-02-11 NOTE — CASE MANAGEMENT
Case Management Progress Note    Patient name Verito Fallon  Location S /S -01 MRN [id number]  : 1940 Date 2025       LOS (days): 6  Geometric Mean LOS (GMLOS) (days): 3.8  Days to GMLOS:-2.1        OBJECTIVE:        Current admission status: Inpatient  Preferred Pharmacy:   [id number] - [place], [place] - [address]  [address] [address]  Phone: [phone] Fax: [phone]    [facility] [place], [place] - [address]  [address] [address]  Phone: [phone] Fax: [phone]    Primary Care Provider: Robbie Warner DO    Primary Insurance: MEDICARE  Secondary Insurance: AETNA    PROGRESS NOTE:      CM reached out to [facility] [phone] to confirm that LOC and optioning packet had been received and were being processed.  CM was only able to leave a voicemail requesting a call back.    CM department will continue to follow to assist with discharge coordination.

## 2025-02-12 LAB
ANION GAP SERPL CALCULATED.3IONS-SCNC: 7 MMOL/L (ref 4–13)
BUN SERPL-MCNC: 20 MG/DL (ref 5–25)
CALCIUM SERPL-MCNC: 8.3 MG/DL (ref 8.4–10.2)
CHLORIDE SERPL-SCNC: 101 MMOL/L (ref 96–108)
CO2 SERPL-SCNC: 30 MMOL/L (ref 21–32)
CREAT SERPL-MCNC: 1 MG/DL (ref 0.6–1.3)
ERYTHROCYTE [DISTWIDTH] IN BLOOD BY AUTOMATED COUNT: 14.1 % (ref 11.6–15.1)
GFR SERPL CREATININE-BSD FRML MDRD: 51 ML/MIN/1.73SQ M
GLUCOSE SERPL-MCNC: 91 MG/DL (ref 65–140)
HCT VFR BLD AUTO: 35.7 % (ref 34.8–46.1)
HGB BLD-MCNC: 11.8 G/DL (ref 11.5–15.4)
MCH RBC QN AUTO: 30.3 PG (ref 26.8–34.3)
MCHC RBC AUTO-ENTMCNC: 33.1 G/DL (ref 31.4–37.4)
MCV RBC AUTO: 92 FL (ref 82–98)
PLATELET # BLD AUTO: 148 THOUSANDS/UL (ref 149–390)
PMV BLD AUTO: 10.8 FL (ref 8.9–12.7)
POTASSIUM SERPL-SCNC: 3 MMOL/L (ref 3.5–5.3)
RBC # BLD AUTO: 3.89 MILLION/UL (ref 3.81–5.12)
SODIUM SERPL-SCNC: 138 MMOL/L (ref 135–147)
WBC # BLD AUTO: 3.2 THOUSAND/UL (ref 4.31–10.16)

## 2025-02-12 PROCEDURE — 97116 GAIT TRAINING THERAPY: CPT

## 2025-02-12 PROCEDURE — 99231 SBSQ HOSP IP/OBS SF/LOW 25: CPT

## 2025-02-12 PROCEDURE — 80048 BASIC METABOLIC PNL TOTAL CA: CPT

## 2025-02-12 PROCEDURE — 85027 COMPLETE CBC AUTOMATED: CPT

## 2025-02-12 RX ORDER — POTASSIUM CHLORIDE 1500 MG/1
40 TABLET, EXTENDED RELEASE ORAL ONCE
Status: COMPLETED | OUTPATIENT
Start: 2025-02-12 | End: 2025-02-12

## 2025-02-12 RX ADMIN — AMIODARONE HYDROCHLORIDE 100 MG: 200 TABLET ORAL at 09:20

## 2025-02-12 RX ADMIN — POTASSIUM CHLORIDE 40 MEQ: 1500 TABLET, EXTENDED RELEASE ORAL at 09:20

## 2025-02-12 RX ADMIN — MELATONIN 3 MG: 3 TAB ORAL at 22:12

## 2025-02-12 RX ADMIN — PRASUGREL 5 MG: 10 TABLET, FILM COATED ORAL at 09:23

## 2025-02-12 RX ADMIN — RIVAROXABAN 15 MG: 15 TABLET, FILM COATED ORAL at 09:20

## 2025-02-12 RX ADMIN — ESCITALOPRAM OXALATE 5 MG: 10 TABLET ORAL at 09:21

## 2025-02-12 RX ADMIN — ACETAMINOPHEN 975 MG: 325 TABLET, FILM COATED ORAL at 22:12

## 2025-02-12 RX ADMIN — METOPROLOL SUCCINATE 25 MG: 25 TABLET, EXTENDED RELEASE ORAL at 09:20

## 2025-02-12 RX ADMIN — ATORVASTATIN CALCIUM 20 MG: 20 TABLET, FILM COATED ORAL at 16:47

## 2025-02-12 RX ADMIN — BUMETANIDE 2 MG: 1 TABLET ORAL at 09:20

## 2025-02-12 NOTE — CASE MANAGEMENT
Case Management Progress Note    Patient name Verito Fallon  Location S /S -01 MRN [id number]  : 1940 Date 2025       LOS (days): 7  Geometric Mean LOS (GMLOS) (days): 3.8  Days to GMLOS:-3.3        OBJECTIVE:        Current admission status: Inpatient  Preferred Pharmacy:   [id number] - [place], [place] - [address]  [address] [address]  Phone: [phone] Fax: [phone]    [facility] [place], [place] - [address]  [address] [address]  Phone: [phone] Fax: [phone]    Primary Care Provider: Robbie Warner DO    Primary Insurance: MEDICARE  Secondary Insurance: AETNA    PROGRESS NOTE:      CM was notified by [facility] that someone would be coming out to the hospital today to complete the FED assessment for LOC.    CM department will continue to follow to assist with discharge coordination.

## 2025-02-12 NOTE — PLAN OF CARE
Problem: PHYSICAL THERAPY ADULT  Goal: Performs mobility at highest level of function for planned discharge setting.  See evaluation for individualized goals.  Description: Treatment/Interventions: ADL retraining, Functional transfer training, LE strengthening/ROM, Elevations, Therapeutic exercise, Endurance training, Cognitive reorientation, Patient/family training, Equipment eval/education, Bed mobility, Gait training, Compensatory technique education, Spoke to nursing, Spoke to case management, OT          See flowsheet documentation for full assessment, interventions and recommendations.  Outcome: Progressing  Note: Prognosis: Fair  Problem List: Decreased strength, Decreased mobility, Impaired balance, Decreased endurance, Decreased skin integrity, Pain, Decreased coordination, Decreased cognition, Impaired judgement, Decreased safety awareness (gait deviaitons)  Assessment: Pt did not need S for transfers this session, but needed progressively more physical A during gait trials. This may be attributed to pain L foot, degradation of balance and gait quality. Strongly recommend Staff perform amb trials in halls throughout the day while pt has shoes on and with using walker, so that therapy staff can provide balance and gait challenge to pt with LRAD of cane vs no device. Additionally pt reporting pain @ L foot--and reports she has podiatry treat her for her painful callouses in past. Skilled PT Recommended to progress pt toward treatment goals.  Barriers to Discharge: Decreased caregiver support, Inaccessible home environment  Barriers to Discharge Comments: pt lives alone at this time and has 3 LUDWIN  Rehab Resource Intensity Level, PT: II (Moderate Resource Intensity)    See flowsheet documentation for full assessment.

## 2025-02-12 NOTE — PROGRESS NOTES
Progress Note - Hospitalist   Name: Verito Fallon 84 y.o. female I MRN: [id number]  Unit/Bed#: S -01 I Date of Admission: 2/4/2025   Date of Service: 2/12/2025 I Hospital Day: 7    Assessment & Plan  Metabolic encephalopathy  Patient with history of cognitive impairment presents to the ER on 302 due to concern regarding suicidal statements while she was visiting her  in the nursing home today. 302 not upheld by the ER provider as patient not currently expressing suicidal intent.   Patient's daughter states that Verito's cognition has been steadily declining for some time and has been recently exacerbated by her  being sick and placed in a nursing home. Prior to this, her  was primary caretaker. Patient has expressed suicidal intent in the past according to daughter however this has been used as a manipulative tactic.   Patient is currently oriented to self only. She currently denies SI/HI.   UA appears to be infected which may be exacerbating her confusion, urine culture shows E. coli  Completed 5 days of IV Ceftriaxone for suspected UTI on 2/8  Delirium precautions   Currently off virtual observation  Psychiatry consulted and ultimately had no further inpatient recommendations  Geriatrics consult appreciated   PT/OT-level 2 recommendations with AM-PAC score 17  Neuropsych consulted and currently determined patient has no capacity.    Will need to likely be options.      Dementia (HCC)  Alert and oriented x to self and location, however not to time or situation  High risk for delirium  Continue with delirium precautions  Encouraged avoidance of overstimulation/under stimulation of sensory organs including appropriate wake/sleep cycle, avoiding unnecessary sounds including call bells and additional monitors, ambient light, and appropriate room lighting/television usage for time of day.  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[facility])  Wt Readings from Last 3 Encounters:   02/12/25 50.5 kg (111 lb 5.3 oz)   09/27/24 53 kg (116 lb 12.8 oz)   08/16/24 55.4 kg (122 lb 1.6 oz)   Appears euvolemic   Continue PO bumex 2mg daily   I/O, Daily weights  Stable  A-fib ([facility])  Rate controlled   Continue Xarelto, Amiodarone, Toprol XL   PAD (peripheral artery disease) ([facility])  Continue Prasugrel, statin   Sick sinus syndrome ([facility])  S/p PPM   CAD (coronary artery disease)  S/p CABG   Continue Prasugrel, Xarelto, BB, statin   Chronic kidney disease, stage 3b ([facility])  Lab Results   Component Value Date    EGFR 51 02/12/2025    EGFR 54 02/07/2025    EGFR 52 02/06/2025    CREATININE 1.00 02/12/2025    CREATININE 0.96 02/07/2025    CREATININE 0.98 02/06/2025     Creatinine currently stable at baseline   Monitor BMP  UTI (urinary tract infection)  UA indicating UTI  Urine culture showing gram-negative rods, await for finalization  Started on IV ceftriaxone, will continue for 5 days total - completed on 2/8   Constipation  Continue a bowel regimen     VTE Pharmacologic Prophylaxis: VTE Score: 4 Moderate Risk (Score 3-4) - Pharmacological DVT Prophylaxis Ordered: rivaroxaban (Xarelto).    Mobility:   Basic Mobility Inpatient Raw Score: 18  JH-HLM Goal: 6: Walk 10 steps or more  JH-HLM Achieved: 6: Walk 10 steps or more  JH-HLM Goal achieved. Continue to encourage appropriate mobility.    Patient Centered Rounds: I performed bedside rounds with nursing staff today.   Discussions with Specialists or Other Care Team Provider: CM    Education and Discussions with Family / Patient: Updated  (daughter) via phone.    Current Length of Stay: 7 day(s)  Current Patient Status: Inpatient   Certification Statement: The patient will continue to require additional inpatient hospital  stay due to safe discharge  Discharge Plan: Anticipate discharge in 48-72 hrs to rehab facility.    Code Status: Level 3 - DNAR and DNI    Subjective   Patient doing well on exam, denies SI/HI SOB, CP    Objective :  Temp:  [97.4 °F (36.3 °C)-98.4 °F (36.9 °C)] 97.4 °F (36.3 °C)  HR:  [71-80] 80  BP: (101-144)/(51-60) 144/60  Resp:  [18] 18  SpO2:  [92 %-97 %] 97 %  O2 Device: None (Room air)    Body mass index is 22.49 kg/m².     Input and Output Summary (last 24 hours):     Intake/Output Summary (Last 24 hours) at 2/12/2025 1222  Last data filed at 2/12/2025 0741  Gross per 24 hour   Intake 417 ml   Output 0 ml   Net 417 ml       Physical Exam  Vitals reviewed.   HENT:      Mouth/Throat:      Mouth: Mucous membranes are moist.   Cardiovascular:      Rate and Rhythm: Normal rate and regular rhythm.      Heart sounds: Normal heart sounds.   Pulmonary:      Effort: Pulmonary effort is normal.      Breath sounds: Normal breath sounds.   Musculoskeletal:      Right lower leg: No edema.      Left lower leg: No edema.   Skin:     General: Skin is warm and dry.   Neurological:      Mental Status: She is alert. Mental status is at baseline.           Lines/Drains:              Lab Results: I have reviewed the following results:   Results from last 7 days   Lab Units 02/12/25  0534 02/07/25  0632 02/06/25  0912   WBC Thousand/uL 3.20*   < > 6.92   HEMOGLOBIN g/dL 11.8   < > 13.5   HEMATOCRIT % 35.7   < > 41.1   PLATELETS Thousands/uL 148*   < > 193   SEGS PCT %  --   --  70   LYMPHO PCT %  --   --  16   MONO PCT %  --   --  8   EOS PCT %  --   --  5    < > = values in this interval not displayed.     Results from last 7 days   Lab Units 02/12/25  0534   SODIUM mmol/L 138   POTASSIUM mmol/L 3.0*   CHLORIDE mmol/L 101   CO2 mmol/L 30   BUN mg/dL 20   CREATININE mg/dL 1.00   ANION GAP mmol/L 7   CALCIUM mg/dL 8.3*   GLUCOSE RANDOM mg/dL 91                       Recent Cultures (last 7 days):               Last 24 Hours  Medication List:     Current Facility-Administered Medications:     acetaminophen (TYLENOL) tablet 650 mg, Q6H PRN    acetaminophen (TYLENOL) tablet 975 mg, TID    amiodarone tablet 100 mg, Daily    atorvastatin (LIPITOR) tablet 20 mg, Daily With Dinner    bumetanide (BUMEX) tablet 2 mg, Daily    escitalopram (LEXAPRO) tablet 5 mg, Daily    melatonin tablet 3 mg, HS    metoprolol succinate (TOPROL-XL) 24 hr tablet 25 mg, Daily    polyethylene glycol (MIRALAX) packet 17 g, Daily    prasugrel (EFFIENT) tablet 5 mg, Daily    rivaroxaban (XARELTO) tablet 15 mg, Daily With Breakfast    senna (SENOKOT) tablet 17.2 mg, BID    Administrative Statements   Today, Patient Was Seen By: Faina Moulton PA-C      **Please Note: This note may have been constructed using a voice recognition system.**

## 2025-02-12 NOTE — PHYSICAL THERAPY NOTE
PHYSICAL THERAPY TREATMENT NOTE  NAME:  Verito Fallon  DATE: 02/12/25    Length Of Stay: 7  Performed at least 2 patient identifiers during session: Name and Epic photo    TREATMENT:    02/12/25 1532   PT Last Visit   PT Visit Date 02/12/25   Note Type   Note Type Treatment   Pain Assessment   Pain Assessment Tool 0-10   Pain Location/Orientation Orientation: Left  (foot)   Effect of Pain on Daily Activities limited amb distance and standing tolerance.   Patient's Stated Pain Goal No pain   Hospital Pain Intervention(s) Repositioned;Ambulation/increased activity;Emotional support   Restrictions/Precautions   Weight Bearing Precautions Per Order No   Other Precautions Bed Alarm;Cognitive;Chair Alarm;Fall Risk;Pain  (limited recall)   General   Chart Reviewed Yes   Family/Caregiver Present No   Cognition   Overall Cognitive Status Impaired   Arousal/Participation Cooperative   Attention Attends with cues to redirect   Orientation Level Oriented to person   Memory Decreased short term memory;Decreased recall of precautions;Decreased recall of recent events   Subjective   Subjective Pt in bathroom, concerned about why her family has not seen nor contacted her. PCA reporting to pt and me that family did call her on phone today. Pt  confirms that she did not ue DME for mobility prior to admission, agreeable to try amb w/o device   Bed Mobility   Supine to Sit   (NT)   Additional Comments Pt noted to have hard callous @ plantar aspect L 5th met head , self reported pain w/ palpation and WB   Transfers   Sit to Stand 5  Supervision   Additional items Increased time required;Armrests   Stand to Sit 5  Supervision   Additional items Increased time required;Armrests   Ambulation/Elevation   Gait pattern Excessively slow;Inconsistent arcenio;Short stride;Step through pattern;Decreased L stance   Gait Assistance   (initially close S w/ walker, progressing to consistent min A with HHA both with slipper socks then with shoes)    Additional items Verbal cues   Assistive Device   (initially w/ rolling walker and slipper socks--> HHA first trial . HHA + shoes second trial)   Distance 100'+80'   Stair Management Assistance Not tested   Balance   Static Sitting Fair +   Static Standing Fair -   Ambulatory Fair -  (initially, regressing to poor+)   Endurance Deficit   Endurance Deficit Yes   Endurance Deficit Description degradation of gait quality with increased distances despite addition of pt wearing shoes.   Activity Tolerance   Activity Tolerance Patient limited by fatigue;Patient limited by pain   Medical Staff Made Aware spoke to OT   Nurse Made Aware care coordination w/ PCA   Assessment   Prognosis Fair   Problem List Decreased strength;Decreased mobility;Impaired balance;Decreased endurance;Decreased skin integrity;Pain;Decreased coordination;Decreased cognition;Impaired judgement;Decreased safety awareness  (gait deviaitons)   Assessment Pt did not need S for transfers this session, but needed progressively more physical A during gait trials. This may be attributed to pain L foot, degradation of balance and gait quality. Strongly recommend Staff perform amb trials in halls throughout the day while pt has shoes on and with using walker, so that therapy staff can provide balance and gait challenge to pt with LRAD of cane vs no device. Additionally pt reporting pain @ L foot--and reports she has podiatry treat her for her painful callouses in past. Skilled PT Recommended to progress pt toward treatment goals.   Barriers to Discharge Decreased caregiver support;Inaccessible home environment   Goals   Patient Goals to talk/see family   STG Expiration Date 02/15/25   PT Treatment Day 2   Plan   Treatment/Interventions Functional transfer training;Elevations;LE strengthening/ROM;Therapeutic exercise;Endurance training;Cognitive reorientation;Equipment eval/education;Gait training;Bed mobility;Patient/family training;Spoke to nursing;OT    Progress Slow progress, decreased activity tolerance   PT Frequency 3-5x/wk   Discharge Recommendation   Rehab Resource Intensity Level, PT II (Moderate Resource Intensity)   Equipment Recommended   (rolling walker for now, but would try to progress to LRAD like cane if pt can perform from cognitive standpoint using in R  hand)   [facility] Basic Mobility Inpatient   Turning in Flat Bed Without Bedrails 3   Lying on Back to Sitting on Edge of Flat Bed Without Bedrails 3   Moving Bed to Chair 3   Standing Up From Chair Using Arms 3   Walk in Room 3   Climb 3-5 Stairs With Railing 1   Basic Mobility Inpatient Raw Score 16   Basic Mobility Standardized Score 38.32   [facility] Highest Level Of Mobility   -HL Goal 5: Stand one or more mins   -HL Achieved 7: Walk 25 feet or more   Education   Education Provided Assistive device;Mobility training   Patient Reinforcement needed;Explanation/teachback used   End of Consult   Patient Position at End of Consult All needs within reach;Bed/Chair alarm activated;Seated edge of bed     Nursing Recommendations:   Mobility Plan as of 02/12/25: Pt is Close S to min Assist with RW to/from bathroom and hallway. PLEASE Encourage OOB for all meals, and pt amb in halls at least 3x daily w/walker while wearing shoes     The patient's [facility] Basic Mobility Inpatient Short Form Raw Score is 16. A Raw score of less than or equal to 16 suggests the patient may benefit from discharge to post-acute rehabilitation services, which MAY coincide with CURRENT above PT recommendations.     However please refer to therapist recommendation for discharge planning given other factors that may influence destination.     Adapted from Zia GLOVER, Desire J, Hui J, John J. [employer] [facility] “6-Clicks” Basic Mobility and Daily Activity Scores With Discharge Destination. Physical Therapy, 2021;101:1-9. DOI: [email or web address]    Zee Kendrick, PT, DPT

## 2025-02-12 NOTE — ASSESSMENT & PLAN NOTE
Lab Results   Component Value Date    EGFR 51 02/12/2025    EGFR 54 02/07/2025    EGFR 52 02/06/2025    CREATININE 1.00 02/12/2025    CREATININE 0.96 02/07/2025    CREATININE 0.98 02/06/2025     Creatinine currently stable at baseline   Monitor BMP

## 2025-02-12 NOTE — ASSESSMENT & PLAN NOTE
Wt Readings from Last 3 Encounters:   02/12/25 50.5 kg (111 lb 5.3 oz)   09/27/24 53 kg (116 lb 12.8 oz)   08/16/24 55.4 kg (122 lb 1.6 oz)   Appears euvolemic   Continue PO bumex 2mg daily   I/O, Daily weights  Stable

## 2025-02-13 LAB — POTASSIUM SERPL-SCNC: 3.6 MMOL/L (ref 3.5–5.3)

## 2025-02-13 PROCEDURE — 99232 SBSQ HOSP IP/OBS MODERATE 35: CPT

## 2025-02-13 PROCEDURE — 84132 ASSAY OF SERUM POTASSIUM: CPT

## 2025-02-13 RX ORDER — HYDROXYZINE HYDROCHLORIDE 25 MG/1
25 TABLET, FILM COATED ORAL EVERY 6 HOURS PRN
Status: DISCONTINUED | OUTPATIENT
Start: 2025-02-13 | End: 2025-02-21 | Stop reason: HOSPADM

## 2025-02-13 RX ADMIN — ESCITALOPRAM OXALATE 5 MG: 10 TABLET ORAL at 09:31

## 2025-02-13 RX ADMIN — METOPROLOL SUCCINATE 25 MG: 25 TABLET, EXTENDED RELEASE ORAL at 09:30

## 2025-02-13 RX ADMIN — ACETAMINOPHEN 975 MG: 325 TABLET, FILM COATED ORAL at 21:53

## 2025-02-13 RX ADMIN — ACETAMINOPHEN 975 MG: 325 TABLET, FILM COATED ORAL at 16:55

## 2025-02-13 RX ADMIN — SENNOSIDES 17.2 MG: 8.6 TABLET ORAL at 09:30

## 2025-02-13 RX ADMIN — ACETAMINOPHEN 975 MG: 325 TABLET, FILM COATED ORAL at 09:29

## 2025-02-13 RX ADMIN — MELATONIN 3 MG: 3 TAB ORAL at 21:53

## 2025-02-13 RX ADMIN — AMIODARONE HYDROCHLORIDE 100 MG: 200 TABLET ORAL at 09:30

## 2025-02-13 RX ADMIN — PRASUGREL 5 MG: 10 TABLET, FILM COATED ORAL at 09:44

## 2025-02-13 RX ADMIN — RIVAROXABAN 15 MG: 15 TABLET, FILM COATED ORAL at 09:42

## 2025-02-13 RX ADMIN — BUMETANIDE 2 MG: 1 TABLET ORAL at 09:29

## 2025-02-13 RX ADMIN — POLYETHYLENE GLYCOL 3350 17 G: 17 POWDER, FOR SOLUTION ORAL at 09:30

## 2025-02-13 RX ADMIN — ATORVASTATIN CALCIUM 20 MG: 20 TABLET, FILM COATED ORAL at 16:55

## 2025-02-13 NOTE — ASSESSMENT & PLAN NOTE
Patient with history of cognitive impairment presents to the ER on 302 due to concern regarding suicidal statements while she was visiting her  in the nursing home today. 302 not upheld by the ER provider as patient not currently expressing suicidal intent.   Patient's daughter states that Verito's cognition has been steadily declining for some time and has been recently exacerbated by her  being sick and placed in a nursing home. Prior to this, her  was primary caretaker. Patient has expressed suicidal intent in the past according to daughter however this has been used as a manipulative tactic.   Patient is currently oriented to self only. She currently denies SI/HI.   UA appears to be infected which may be exacerbating her confusion, urine culture shows E. coli  Completed 5 days of IV Ceftriaxone for suspected UTI on 2/8  Delirium precautions   Currently off virtual observation  Psychiatry consulted and ultimately had no further inpatient recommendations  Geriatrics consult appreciated   PT/OT-level 2 recommendations with AM-PAC score 17  Neuropsych consulted and currently determined patient has no capacity.    Pending placement

## 2025-02-13 NOTE — ASSESSMENT & PLAN NOTE
Wt Readings from Last 3 Encounters:   02/13/25 50.9 kg (112 lb 3.4 oz)   09/27/24 53 kg (116 lb 12.8 oz)   08/16/24 55.4 kg (122 lb 1.6 oz)   Appears euvolemic   Continue PO bumex 2mg daily   I/O, Daily weights  Stable

## 2025-02-13 NOTE — CASE MANAGEMENT
Case Management Progress Note    Patient name Verito Fallon  Location S /S -01 MRN [id number]  : 1940 Date 2025       LOS (days): 8  Geometric Mean LOS (GMLOS) (days): 3.8  Days to GMLOS:-4.3        OBJECTIVE:        Current admission status: Inpatient  Preferred Pharmacy:   [id number] - [place], [place] - [address]  [address] [address]  Phone: [phone] Fax: [phone]    [facility] [place], [place] - [address]  [address] [address]  Phone: [phone] Fax: [phone]    Primary Care Provider: Robbie Warner DO    Primary Insurance: MEDICARE  Secondary Insurance: [employer]ET[employer]    PROGRESS NOTE:    LOC paperwork received from [facility].  This was added to the patient's STR referral and scanned into medical records.    We are still waiting on the mental health letter from the [facility] clearing her for STR placement.    CM department will continue to follow to assist with discharge coordination.

## 2025-02-13 NOTE — PROGRESS NOTES
Progress Note - Hospitalist   Name: Verito Fallon 84 y.o. female I MRN: [id number]  Unit/Bed#: S -01 I Date of Admission: 2/4/2025   Date of Service: 2/13/2025 I Hospital Day: 8    Assessment & Plan  Metabolic encephalopathy  Patient with history of cognitive impairment presents to the ER on 302 due to concern regarding suicidal statements while she was visiting her  in the nursing home today. 302 not upheld by the ER provider as patient not currently expressing suicidal intent.   Patient's daughter states that Verito's cognition has been steadily declining for some time and has been recently exacerbated by her  being sick and placed in a nursing home. Prior to this, her  was primary caretaker. Patient has expressed suicidal intent in the past according to daughter however this has been used as a manipulative tactic.   Patient is currently oriented to self only. She currently denies SI/HI.   UA appears to be infected which may be exacerbating her confusion, urine culture shows E. coli  Completed 5 days of IV Ceftriaxone for suspected UTI on 2/8  Delirium precautions   Currently off virtual observation  Psychiatry consulted and ultimately had no further inpatient recommendations  Geriatrics consult appreciated   PT/OT-level 2 recommendations with AM-PAC score 17  Neuropsych consulted and currently determined patient has no capacity.    Pending placement     Dementia (HCC)  Alert and oriented x to self and location, however not to time or situation  High risk for delirium  Continue with delirium precautions  Encouraged avoidance of overstimulation/under stimulation of sensory organs including appropriate wake/sleep cycle, avoiding unnecessary sounds including call bells and additional monitors, ambient light, and appropriate room lighting/television usage for time of day.  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[facility])  Wt Readings from Last 3 Encounters:   02/13/25 50.9 kg (112 lb 3.4 oz)   09/27/24 53 kg (116 lb 12.8 oz)   08/16/24 55.4 kg (122 lb 1.6 oz)   Appears euvolemic   Continue PO bumex 2mg daily   I/O, Daily weights  Stable  A-fib ([facility])  Rate controlled   Continue Xarelto, Amiodarone, Toprol XL   PAD (peripheral artery disease) ([facility])  Continue Prasugrel, statin   Sick sinus syndrome ([facility])  S/p PPM   CAD (coronary artery disease)  S/p CABG   Continue Prasugrel, Xarelto, BB, statin   Chronic kidney disease, stage 3b ([facility])  Lab Results   Component Value Date    EGFR 51 02/12/2025    EGFR 54 02/07/2025    EGFR 52 02/06/2025    CREATININE 1.00 02/12/2025    CREATININE 0.96 02/07/2025    CREATININE 0.98 02/06/2025     Creatinine currently stable at baseline   Monitor BMP  UTI (urinary tract infection)  UA indicating UTI  Urine culture showing gram-negative rods, await for finalization  Started on IV ceftriaxone, will continue for 5 days total - completed on 2/8   Constipation  Continue a bowel regimen     VTE Pharmacologic Prophylaxis: VTE Score: 4 Moderate Risk (Score 3-4) - Pharmacological DVT Prophylaxis Ordered: rivaroxaban (Xarelto).    Mobility:   Basic Mobility Inpatient Raw Score: 16  JH-HLM Goal: 5: Stand one or more mins  JH-HLM Achieved: 6: Walk 10 steps or more  JH-HLM Goal achieved. Continue to encourage appropriate mobility.    Patient Centered Rounds: I performed bedside rounds with nursing staff today.   Discussions with Specialists or Other Care Team Provider: CM    Education and Discussions with Family / Patient: Updated  (daughter) via phone. Attempted to update son x3 today at his request. Left voice message    Current Length of Stay: 8 day(s)  Current Patient Status: Inpatient   Certification Statement: The patient  will continue to require additional inpatient hospital stay due to pending placement   Discharge Plan:  pending placement    Code Status: Level 3 - DNAR and DNI    Subjective   Seen and examined. No acute events overnight.     Objective :  Temp:  [97.9 °F (36.6 °C)-98.1 °F (36.7 °C)] 97.9 °F (36.6 °C)  HR:  [50-75] 70  BP: (109-161)/(51-69) 134/64  Resp:  [18] 18  SpO2:  [92 %-98 %] 98 %  O2 Device: None (Room air)  Nasal Cannula O2 Flow Rate (L/min):  [2 L/min] 2 L/min    Body mass index is 22.66 kg/m².     Input and Output Summary (last 24 hours):     Intake/Output Summary (Last 24 hours) at 2/13/2025 0756  Last data filed at 2/13/2025 0541  Gross per 24 hour   Intake --   Output 400 ml   Net -400 ml       Physical Exam  Constitutional:       General: She is not in acute distress.  HENT:      Head: Normocephalic.      Nose: Nose normal.      Mouth/Throat:      Mouth: Mucous membranes are moist.   Eyes:      Conjunctiva/sclera: Conjunctivae normal.   Cardiovascular:      Rate and Rhythm: Normal rate.      Heart sounds: Normal heart sounds.   Pulmonary:      Effort: Pulmonary effort is normal.      Breath sounds: No wheezing, rhonchi or rales.   Abdominal:      Palpations: Abdomen is soft.   Musculoskeletal:      Right lower leg: No edema.      Left lower leg: No edema.   Skin:     General: Skin is warm.   Neurological:      Mental Status: Mental status is at baseline.   Psychiatric:         Mood and Affect: Mood normal.       Lines/Drains:        Lab Results: I have reviewed the following results:   Results from last 7 days   Lab Units 02/12/25  0534 02/07/25  0632 02/06/25  0912   WBC Thousand/uL 3.20*   < > 6.92   HEMOGLOBIN g/dL 11.8   < > 13.5   HEMATOCRIT % 35.7   < > 41.1   PLATELETS Thousands/uL 148*   < > 193   SEGS PCT %  --   --  70   LYMPHO PCT %  --   --  16   MONO PCT %  --   --  8   EOS PCT %  --   --  5    < > = values in this interval not displayed.     Results from last 7 days   Lab Units  02/13/25  0541 02/12/25  0534   SODIUM mmol/L  --  138   POTASSIUM mmol/L 3.6 3.0*   CHLORIDE mmol/L  --  101   CO2 mmol/L  --  30   BUN mg/dL  --  20   CREATININE mg/dL  --  1.00   ANION GAP mmol/L  --  7   CALCIUM mg/dL  --  8.3*   GLUCOSE RANDOM mg/dL  --  91                       Recent Cultures (last 7 days):         Imaging Results Review: No pertinent imaging studies reviewed.  Other Study Results Review: No additional pertinent studies reviewed.    Last 24 Hours Medication List:     Current Facility-Administered Medications:     acetaminophen (TYLENOL) tablet 650 mg, Q6H PRN    acetaminophen (TYLENOL) tablet 975 mg, TID    amiodarone tablet 100 mg, Daily    atorvastatin (LIPITOR) tablet 20 mg, Daily With Dinner    bumetanide (BUMEX) tablet 2 mg, Daily    escitalopram (LEXAPRO) tablet 5 mg, Daily    melatonin tablet 3 mg, HS    metoprolol succinate (TOPROL-XL) 24 hr tablet 25 mg, Daily    polyethylene glycol (MIRALAX) packet 17 g, Daily    prasugrel (EFFIENT) tablet 5 mg, Daily    rivaroxaban (XARELTO) tablet 15 mg, Daily With Breakfast    senna (SENOKOT) tablet 17.2 mg, BID    Administrative Statements   Today, Patient Was Seen By: Breonna Nation PA-C    **Please Note: This note may have been constructed using a voice recognition system.**

## 2025-02-14 LAB
ANION GAP SERPL CALCULATED.3IONS-SCNC: 7 MMOL/L (ref 4–13)
BUN SERPL-MCNC: 21 MG/DL (ref 5–25)
CALCIUM SERPL-MCNC: 8.6 MG/DL (ref 8.4–10.2)
CHLORIDE SERPL-SCNC: 100 MMOL/L (ref 96–108)
CO2 SERPL-SCNC: 32 MMOL/L (ref 21–32)
CREAT SERPL-MCNC: 0.93 MG/DL (ref 0.6–1.3)
ERYTHROCYTE [DISTWIDTH] IN BLOOD BY AUTOMATED COUNT: 13.7 % (ref 11.6–15.1)
GFR SERPL CREATININE-BSD FRML MDRD: 56 ML/MIN/1.73SQ M
GLUCOSE SERPL-MCNC: 114 MG/DL (ref 65–140)
HCT VFR BLD AUTO: 40.3 % (ref 34.8–46.1)
HGB BLD-MCNC: 13.1 G/DL (ref 11.5–15.4)
MCH RBC QN AUTO: 30.3 PG (ref 26.8–34.3)
MCHC RBC AUTO-ENTMCNC: 32.5 G/DL (ref 31.4–37.4)
MCV RBC AUTO: 93 FL (ref 82–98)
PLATELET # BLD AUTO: 169 THOUSANDS/UL (ref 149–390)
PMV BLD AUTO: 10.9 FL (ref 8.9–12.7)
POTASSIUM SERPL-SCNC: 3.2 MMOL/L (ref 3.5–5.3)
RBC # BLD AUTO: 4.33 MILLION/UL (ref 3.81–5.12)
SODIUM SERPL-SCNC: 139 MMOL/L (ref 135–147)
WBC # BLD AUTO: 5.55 THOUSAND/UL (ref 4.31–10.16)

## 2025-02-14 PROCEDURE — 85027 COMPLETE CBC AUTOMATED: CPT

## 2025-02-14 PROCEDURE — 99232 SBSQ HOSP IP/OBS MODERATE 35: CPT

## 2025-02-14 PROCEDURE — 80048 BASIC METABOLIC PNL TOTAL CA: CPT

## 2025-02-14 RX ADMIN — BUMETANIDE 2 MG: 1 TABLET ORAL at 08:00

## 2025-02-14 RX ADMIN — PRASUGREL 5 MG: 10 TABLET, FILM COATED ORAL at 08:03

## 2025-02-14 RX ADMIN — RIVAROXABAN 15 MG: 15 TABLET, FILM COATED ORAL at 07:59

## 2025-02-14 RX ADMIN — AMIODARONE HYDROCHLORIDE 100 MG: 200 TABLET ORAL at 08:01

## 2025-02-14 RX ADMIN — ATORVASTATIN CALCIUM 20 MG: 20 TABLET, FILM COATED ORAL at 16:07

## 2025-02-14 RX ADMIN — METOPROLOL SUCCINATE 25 MG: 25 TABLET, EXTENDED RELEASE ORAL at 08:00

## 2025-02-14 RX ADMIN — ACETAMINOPHEN 975 MG: 325 TABLET, FILM COATED ORAL at 20:45

## 2025-02-14 RX ADMIN — ESCITALOPRAM OXALATE 5 MG: 10 TABLET ORAL at 08:01

## 2025-02-14 RX ADMIN — MELATONIN 3 MG: 3 TAB ORAL at 21:03

## 2025-02-14 RX ADMIN — HYDROXYZINE HYDROCHLORIDE 25 MG: 25 TABLET ORAL at 19:45

## 2025-02-14 NOTE — ASSESSMENT & PLAN NOTE
Lab Results   Component Value Date    EGFR 56 02/14/2025    EGFR 51 02/12/2025    EGFR 54 02/07/2025    CREATININE 0.93 02/14/2025    CREATININE 1.00 02/12/2025    CREATININE 0.96 02/07/2025     Creatinine currently stable at baseline   Monitor BMP

## 2025-02-14 NOTE — PROGRESS NOTES
Progress Note - Hospitalist   Name: Verito Fallon 84 y.o. female I MRN: [id number]  Unit/Bed#: S -01 I Date of Admission: 2/4/2025   Date of Service: 2/14/2025 I Hospital Day: 9    Assessment & Plan  Metabolic encephalopathy  Patient with history of cognitive impairment presents to the ER on 302 due to concern regarding suicidal statements while she was visiting her  in the nursing home today. 302 not upheld by the ER provider as patient not currently expressing suicidal intent.   Patient's daughter states that Verito's cognition has been steadily declining for some time and has been recently exacerbated by her  being sick and placed in a nursing home. Prior to this, her  was primary caretaker. Patient has expressed suicidal intent in the past according to daughter however this has been used as a manipulative tactic.   Patient is currently oriented to self only. She currently denies SI/HI.   UA appears to be infected which may be exacerbating her confusion, urine culture shows E. coli  Completed 5 days of IV Ceftriaxone for suspected UTI on 2/8  Delirium precautions   Currently off virtual observation  Psychiatry consulted and ultimately had no further inpatient recommendations  Geriatrics consult appreciated   PT/OT-level 2 recommendations with AM-PAC score 17  Neuropsych consulted and currently determined patient has no capacity.    Pending placement     Dementia (HCC)  Alert and oriented x to self and location, however not to time or situation  High risk for delirium  Continue with delirium precautions  Encouraged avoidance of overstimulation/under stimulation of sensory organs including appropriate wake/sleep cycle, avoiding unnecessary sounds including call bells and additional monitors, ambient light, and appropriate room lighting/television usage for time of day.  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[facility])  Wt Readings from Last 3 Encounters:   02/14/25 50.4 kg (111 lb 3.2 oz)   09/27/24 53 kg (116 lb 12.8 oz)   08/16/24 55.4 kg (122 lb 1.6 oz)   Appears euvolemic   Continue PO bumex 2mg daily   I/O, Daily weights  Stable  A-fib ([facility])  Rate controlled   Continue Xarelto, Amiodarone, Toprol XL   PAD (peripheral artery disease) ([facility])  Continue Prasugrel, statin   Sick sinus syndrome ([facility])  S/p PPM   CAD (coronary artery disease)  S/p CABG   Continue Prasugrel, Xarelto, BB, statin   Chronic kidney disease, stage 3b ([facility])  Lab Results   Component Value Date    EGFR 56 02/14/2025    EGFR 51 02/12/2025    EGFR 54 02/07/2025    CREATININE 0.93 02/14/2025    CREATININE 1.00 02/12/2025    CREATININE 0.96 02/07/2025     Creatinine currently stable at baseline   Monitor BMP  UTI (urinary tract infection)  UA indicating UTI  Urine culture showing gram-negative rods, await for finalization  Started on IV ceftriaxone, will continue for 5 days total - completed on 2/8   Constipation  Continue a bowel regimen     VTE Pharmacologic Prophylaxis: VTE Score: 4 Moderate Risk (Score 3-4) - Pharmacological DVT Prophylaxis Ordered: rivaroxaban (Xarelto).    Mobility:   Basic Mobility Inpatient Raw Score: 16  JH-HLM Goal: 5: Stand one or more mins  JH-HLM Achieved: 6: Walk 10 steps or more  JH-HLM Goal achieved. Continue to encourage appropriate mobility.    Patient Centered Rounds: I performed bedside rounds with nursing staff today.   Discussions with Specialists or Other Care Team Provider: CM    Education and Discussions with Family / Patient:  Attempted update.     Current Length of Stay: 9 day(s)  Current Patient Status: Inpatient   Certification Statement: The patient will continue to require additional inpatient hospital stay due to awaiting safe  dispo  Discharge Plan: Anticipate discharge in 48 hrs to discharge location to be determined pending rehab evaluations.    Code Status: Level 3 - DNAR and DNI    Subjective   Patient reports feeling well is a poor historian.  Currently [place] any chest pain/pressure, palpitations    Objective :  Temp:  [97.7 °F (36.5 °C)-99.3 °F (37.4 °C)] 99.3 °F (37.4 °C)  HR:  [70-74] 70  BP: (112-140)/(45-63) 122/58  Resp:  [16-18] 18  SpO2:  [92 %-98 %] 97 %  O2 Device: None (Room air)    Body mass index is 22.46 kg/m².     Input and Output Summary (last 24 hours):     Intake/Output Summary (Last 24 hours) at 2/14/2025 1705  Last data filed at 2/14/2025 1654  Gross per 24 hour   Intake 480 ml   Output --   Net 480 ml       Physical Exam  Vitals and nursing note reviewed.   Constitutional:       General: She is not in acute distress.     Appearance: She is normal weight. She is not ill-appearing, toxic-appearing or diaphoretic.   HENT:      Head: Normocephalic.      Nose: Nose normal.      Mouth/Throat:      Mouth: Mucous membranes are moist.      Pharynx: Oropharynx is clear.   Eyes:      General: No scleral icterus.     Conjunctiva/sclera: Conjunctivae normal.      Pupils: Pupils are equal, round, and reactive to light.   Cardiovascular:      Rate and Rhythm: Normal rate and regular rhythm.      Heart sounds: No murmur heard.     No friction rub. No gallop.   Pulmonary:      Effort: Pulmonary effort is normal. No respiratory distress.      Breath sounds: Normal breath sounds. No stridor. No wheezing, rhonchi or rales.   Abdominal:      General: Abdomen is flat.      Palpations: Abdomen is soft.   Musculoskeletal:         General: Normal range of motion.      Cervical back: Normal range of motion and neck supple.      Right lower leg: No edema.      Left lower leg: No edema.   Lymphadenopathy:      Cervical: No cervical adenopathy.   Skin:     General: Skin is warm.      Coloration: Skin is not jaundiced or pale.      Findings:  No bruising, erythema or lesion.   Neurological:      Mental Status: She is alert.      Cranial Nerves: No cranial nerve deficit.      Motor: No weakness.      Comments: Alert and oriented to self and location   Psychiatric:         Mood and Affect: Mood normal.         Behavior: Behavior normal.         Thought Content: Thought content normal.                   Lab Results: I have reviewed the following results:   Results from last 7 days   Lab Units 02/14/25  1153   WBC Thousand/uL 5.55   HEMOGLOBIN g/dL 13.1   HEMATOCRIT % 40.3   PLATELETS Thousands/uL 169     Results from last 7 days   Lab Units 02/14/25  1153   SODIUM mmol/L 139   POTASSIUM mmol/L 3.2*   CHLORIDE mmol/L 100   CO2 mmol/L 32   BUN mg/dL 21   CREATININE mg/dL 0.93   ANION GAP mmol/L 7   CALCIUM mg/dL 8.6   GLUCOSE RANDOM mg/dL 114                       Recent Cultures (last 7 days):         Imaging Results Review: No pertinent imaging studies reviewed.  Other Study Results Review: No additional pertinent studies reviewed.    Last 24 Hours Medication List:     Current Facility-Administered Medications:     acetaminophen (TYLENOL) tablet 650 mg, Q6H PRN    acetaminophen (TYLENOL) tablet 975 mg, TID    amiodarone tablet 100 mg, Daily    atorvastatin (LIPITOR) tablet 20 mg, Daily With Dinner    bumetanide (BUMEX) tablet 2 mg, Daily    escitalopram (LEXAPRO) tablet 5 mg, Daily    hydrOXYzine HCL (ATARAX) tablet 25 mg, Q6H PRN    melatonin tablet 3 mg, HS    metoprolol succinate (TOPROL-XL) 24 hr tablet 25 mg, Daily    polyethylene glycol (MIRALAX) packet 17 g, Daily    prasugrel (EFFIENT) tablet 5 mg, Daily    rivaroxaban (XARELTO) tablet 15 mg, Daily With Breakfast    senna (SENOKOT) tablet 17.2 mg, BID    Administrative Statements   Today, Patient Was Seen By: Danny Church PA-C  I have spent a total time of 35 minutes in caring for this patient on the day of the visit/encounter including Documenting in the medical record, Reviewing / ordering  tests, medicine, procedures  , Obtaining or reviewing history  , and Communicating with other healthcare professionals .    **Please Note: This note may have been constructed using a voice recognition system.**

## 2025-02-14 NOTE — ASSESSMENT & PLAN NOTE
Wt Readings from Last 3 Encounters:   02/14/25 50.4 kg (111 lb 3.2 oz)   09/27/24 53 kg (116 lb 12.8 oz)   08/16/24 55.4 kg (122 lb 1.6 oz)   Appears euvolemic   Continue PO bumex 2mg daily   I/O, Daily weights  Stable

## 2025-02-15 PROCEDURE — 99232 SBSQ HOSP IP/OBS MODERATE 35: CPT

## 2025-02-15 RX ADMIN — AMIODARONE HYDROCHLORIDE 100 MG: 200 TABLET ORAL at 08:16

## 2025-02-15 RX ADMIN — RIVAROXABAN 15 MG: 15 TABLET, FILM COATED ORAL at 08:17

## 2025-02-15 RX ADMIN — METOPROLOL SUCCINATE 25 MG: 25 TABLET, EXTENDED RELEASE ORAL at 08:17

## 2025-02-15 RX ADMIN — BUMETANIDE 2 MG: 1 TABLET ORAL at 08:16

## 2025-02-15 RX ADMIN — ESCITALOPRAM OXALATE 5 MG: 10 TABLET ORAL at 08:15

## 2025-02-15 RX ADMIN — MELATONIN 3 MG: 3 TAB ORAL at 21:26

## 2025-02-15 RX ADMIN — PRASUGREL 5 MG: 10 TABLET, FILM COATED ORAL at 10:45

## 2025-02-15 RX ADMIN — ACETAMINOPHEN 975 MG: 325 TABLET, FILM COATED ORAL at 21:26

## 2025-02-15 RX ADMIN — SENNOSIDES 17.2 MG: 8.6 TABLET ORAL at 17:52

## 2025-02-15 RX ADMIN — ATORVASTATIN CALCIUM 20 MG: 20 TABLET, FILM COATED ORAL at 17:52

## 2025-02-15 NOTE — PROGRESS NOTES
Progress Note - Hospitalist   Name: Verito Fallon 84 y.o. female I MRN: [id number]  Unit/Bed#: S -01 I Date of Admission: 2/4/2025   Date of Service: 2/15/2025 I Hospital Day: 10    Assessment & Plan  Metabolic encephalopathy  Patient with history of cognitive impairment presents to the ER on 302 due to concern regarding suicidal statements while she was visiting her  in the nursing home today. 302 not upheld by the ER provider as patient not currently expressing suicidal intent.   Patient's daughter states that Verito's cognition has been steadily declining for some time and has been recently exacerbated by her  being sick and placed in a nursing home. Prior to this, her  was primary caretaker. Patient has expressed suicidal intent in the past according to daughter however this has been used as a manipulative tactic.   Patient is currently oriented to self only. She currently denies SI/HI.   UA appears to be infected which may be exacerbating her confusion, urine culture shows E. coli  Completed 5 days of IV Ceftriaxone for suspected UTI on 2/8  Delirium precautions   Currently off virtual observation  Psychiatry consulted and ultimately had no further inpatient recommendations  Geriatrics consult appreciated   PT/OT-level 2 recommendations with AM-PAC score 17  Neuropsych consulted and currently determined patient has no capacity.    Pending placement     Dementia (HCC)  Alert and oriented x to self and location, however not to time or situation  High risk for delirium  Continue with delirium precautions  Encouraged avoidance of overstimulation/under stimulation of sensory organs including appropriate wake/sleep cycle, avoiding unnecessary sounds including call bells and additional monitors, ambient light, and appropriate room lighting/television usage for time of day.  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[facility])  Wt Readings from Last 3 Encounters:   02/15/25 49.8 kg (109 lb 12.6 oz)   09/27/24 53 kg (116 lb 12.8 oz)   08/16/24 55.4 kg (122 lb 1.6 oz)   Appears euvolemic   Continue PO bumex 2mg daily   I/O, Daily weights  Stable  A-fib ([facility])  Rate controlled   Continue Xarelto, Amiodarone, Toprol XL   PAD (peripheral artery disease) ([facility])  Continue Prasugrel, statin   Sick sinus syndrome ([facility])  S/p PPM   CAD (coronary artery disease)  S/p CABG   Continue Prasugrel, Xarelto, BB, statin   Chronic kidney disease, stage 3b ([facility])  Lab Results   Component Value Date    EGFR 56 02/14/2025    EGFR 51 02/12/2025    EGFR 54 02/07/2025    CREATININE 0.93 02/14/2025    CREATININE 1.00 02/12/2025    CREATININE 0.96 02/07/2025     Creatinine currently stable at baseline   Monitor BMP  UTI (urinary tract infection)  UA indicating UTI  Urine culture showing gram-negative rods, await for finalization  Started on IV ceftriaxone, will continue for 5 days total - completed on 2/8   Constipation  Continue a bowel regimen     VTE Pharmacologic Prophylaxis: VTE Score: 4 Moderate Risk (Score 3-4) - Pharmacological DVT Prophylaxis Ordered: rivaroxaban (Xarelto).    Mobility:   Basic Mobility Inpatient Raw Score: 16  JH-HLM Goal: 5: Stand one or more mins  JH-HLM Achieved: 6: Walk 10 steps or more  JH-HLM Goal achieved. Continue to encourage appropriate mobility.    Patient Centered Rounds: I performed bedside rounds with nursing staff today.   Discussions with Specialists or Other Care Team Provider: CM    Education and Discussions with Family / Patient: Updated  (daughter) via phone.    Current Length of Stay: 10 day(s)  Current Patient Status: Inpatient   Certification Statement: The patient will continue to require additional inpatient hospital stay due to  awaiting placement  Discharge Plan: Anticipate discharge in 48 hrs to rehab facility.    Code Status: Level 3 - DNAR and DNI    Subjective   Patient reports to be feeling well.  Currently denies any chest pain/pressure, palpitations, lightheadedness, nausea, shortness of breath, or chills.    Objective :  Temp:  [97.9 °F (36.6 °C)-99.3 °F (37.4 °C)] 97.9 °F (36.6 °C)  HR:  [70-74] 73  BP: (107-156)/(49-61) 156/61  Resp:  [18] 18  SpO2:  [94 %-98 %] 94 %  O2 Device: None (Room air)    Body mass index is 22.17 kg/m².     Input and Output Summary (last 24 hours):     Intake/Output Summary (Last 24 hours) at 2/15/2025 1134  Last data filed at 2/14/2025 1944  Gross per 24 hour   Intake 480 ml   Output 300 ml   Net 180 ml       Physical Exam  Vitals and nursing note reviewed.   Constitutional:       General: She is not in acute distress.     Appearance: She is normal weight. She is not ill-appearing, toxic-appearing or diaphoretic.   HENT:      Head: Normocephalic.      Nose: Nose normal.      Mouth/Throat:      Mouth: Mucous membranes are moist.      Pharynx: Oropharynx is clear.   Eyes:      General: No scleral icterus.     Conjunctiva/sclera: Conjunctivae normal.      Pupils: Pupils are equal, round, and reactive to light.   Cardiovascular:      Rate and Rhythm: Normal rate and regular rhythm.      Heart sounds: No murmur heard.     No friction rub. No gallop.   Pulmonary:      Effort: Pulmonary effort is normal. No respiratory distress.      Breath sounds: Normal breath sounds. No stridor. No wheezing, rhonchi or rales.   Abdominal:      General: Abdomen is flat.      Palpations: Abdomen is soft.   Musculoskeletal:         General: Normal range of motion.      Cervical back: Normal range of motion and neck supple.      Right lower leg: No edema.      Left lower leg: No edema.   Lymphadenopathy:      Cervical: No cervical adenopathy.   Skin:     General: Skin is warm.      Coloration: Skin is not jaundiced or pale.       Findings: No bruising, erythema or lesion.   Neurological:      General: No focal deficit present.      Mental Status: She is alert. Mental status is at baseline.      Cranial Nerves: No cranial nerve deficit.      Motor: No weakness.   Psychiatric:         Mood and Affect: Mood normal.         Behavior: Behavior normal.         Thought Content: Thought content normal.             Lab Results: I have reviewed the following results:   Results from last 7 days   Lab Units 02/14/25  1153   WBC Thousand/uL 5.55   HEMOGLOBIN g/dL 13.1   HEMATOCRIT % 40.3   PLATELETS Thousands/uL 169     Results from last 7 days   Lab Units 02/14/25  1153   SODIUM mmol/L 139   POTASSIUM mmol/L 3.2*   CHLORIDE mmol/L 100   CO2 mmol/L 32   BUN mg/dL 21   CREATININE mg/dL 0.93   ANION GAP mmol/L 7   CALCIUM mg/dL 8.6   GLUCOSE RANDOM mg/dL 114                       Recent Cultures (last 7 days):         Imaging Results Review: No pertinent imaging studies reviewed.  Other Study Results Review: No additional pertinent studies reviewed.    Last 24 Hours Medication List:     Current Facility-Administered Medications:     acetaminophen (TYLENOL) tablet 650 mg, Q6H PRN    acetaminophen (TYLENOL) tablet 975 mg, TID    amiodarone tablet 100 mg, Daily    atorvastatin (LIPITOR) tablet 20 mg, Daily With Dinner    bumetanide (BUMEX) tablet 2 mg, Daily    escitalopram (LEXAPRO) tablet 5 mg, Daily    hydrOXYzine HCL (ATARAX) tablet 25 mg, Q6H PRN    melatonin tablet 3 mg, HS    metoprolol succinate (TOPROL-XL) 24 hr tablet 25 mg, Daily    polyethylene glycol (MIRALAX) packet 17 g, Daily    prasugrel (EFFIENT) tablet 5 mg, Daily    rivaroxaban (XARELTO) tablet 15 mg, Daily With Breakfast    senna (SENOKOT) tablet 17.2 mg, BID    Administrative Statements   Today, Patient Was Seen By: Danny Church PA-C  I have spent a total time of 35 minutes in caring for this patient on the day of the visit/encounter including Documenting in the medical record,  Reviewing / ordering tests, medicine, procedures  , Obtaining or reviewing history  , and Communicating with other healthcare professionals .    **Please Note: This note may have been constructed using a voice recognition system.**

## 2025-02-15 NOTE — ASSESSMENT & PLAN NOTE
Wt Readings from Last 3 Encounters:   02/15/25 49.8 kg (109 lb 12.6 oz)   09/27/24 53 kg (116 lb 12.8 oz)   08/16/24 55.4 kg (122 lb 1.6 oz)   Appears euvolemic   Continue PO bumex 2mg daily   I/O, Daily weights  Stable

## 2025-02-15 NOTE — PLAN OF CARE
Problem: Potential for Falls  Goal: Patient will remain free of falls  Description: INTERVENTIONS:  - Educate patient/family on patient safety including physical limitations  - Instruct patient to call for assistance with activity   - Consult OT/PT to assist with strengthening/mobility   - Keep Call bell within reach  - Keep bed low and locked with side rails adjusted as appropriate  - Keep care items and personal belongings within reach  - Initiate and maintain comfort rounds  - Make Fall Risk Sign visible to staff  - Offer Toileting every 2 Hours, in advance of need  - Initiate/Maintain bed alarm  - Obtain necessary fall risk management equipment  - Apply yellow socks and bracelet for high fall risk patients  - Consider moving patient to room near nurses station  Outcome: Progressing     Problem: NEUROSENSORY - ADULT  Goal: Achieves stable or improved neurological status  Description: INTERVENTIONS  - Monitor and report changes in neurological status  - Monitor vital signs such as temperature, blood pressure, glucose, and any other labs ordered   - Initiate measures to prevent increased intracranial pressure  - Monitor for seizure activity and implement precautions if appropriate      Outcome: Progressing  Goal: Remains free of injury related to seizures activity  Description: INTERVENTIONS  - Maintain airway, patient safety  and administer oxygen as ordered  - Monitor patient for seizure activity, document and report duration and description of seizure to physician/advanced practitioner  - If seizure occurs,  ensure patient safety during seizure  - Reorient patient post seizure  - Seizure pads on all 4 side rails  - Instruct patient/family to notify RN of any seizure activity including if an aura is experienced  - Instruct patient/family to call for assistance with activity based on nursing assessment  - Administer anti-seizure medications if ordered    Outcome: Progressing  Goal: Achieves maximal functionality  and self care  Description: INTERVENTIONS  - Monitor swallowing and airway patency with patient fatigue and changes in neurological status  - Encourage and assist patient to increase activity and self care.   - Encourage visually impaired, hearing impaired and aphasic patients to use assistive/communication devices  Outcome: Progressing

## 2025-02-16 PROCEDURE — 99232 SBSQ HOSP IP/OBS MODERATE 35: CPT

## 2025-02-16 RX ADMIN — ATORVASTATIN CALCIUM 20 MG: 20 TABLET, FILM COATED ORAL at 17:09

## 2025-02-16 RX ADMIN — BUMETANIDE 2 MG: 1 TABLET ORAL at 08:00

## 2025-02-16 RX ADMIN — MELATONIN 3 MG: 3 TAB ORAL at 22:04

## 2025-02-16 RX ADMIN — ESCITALOPRAM OXALATE 5 MG: 10 TABLET ORAL at 08:01

## 2025-02-16 RX ADMIN — METOPROLOL SUCCINATE 25 MG: 25 TABLET, EXTENDED RELEASE ORAL at 08:00

## 2025-02-16 RX ADMIN — POLYETHYLENE GLYCOL 3350 17 G: 17 POWDER, FOR SOLUTION ORAL at 08:13

## 2025-02-16 RX ADMIN — SENNOSIDES 8.6 MG: 8.6 TABLET ORAL at 08:00

## 2025-02-16 RX ADMIN — RIVAROXABAN 15 MG: 15 TABLET, FILM COATED ORAL at 08:00

## 2025-02-16 RX ADMIN — AMIODARONE HYDROCHLORIDE 100 MG: 200 TABLET ORAL at 08:01

## 2025-02-16 RX ADMIN — ACETAMINOPHEN 975 MG: 325 TABLET, FILM COATED ORAL at 17:09

## 2025-02-16 RX ADMIN — PRASUGREL 5 MG: 10 TABLET, FILM COATED ORAL at 08:07

## 2025-02-16 NOTE — ASSESSMENT & PLAN NOTE
Wt Readings from Last 3 Encounters:   02/16/25 50 kg (110 lb 3.7 oz)   09/27/24 53 kg (116 lb 12.8 oz)   08/16/24 55.4 kg (122 lb 1.6 oz)   Appears euvolemic   Continue PO bumex 2mg daily   I/O, Daily weights  Stable

## 2025-02-16 NOTE — PLAN OF CARE
Problem: Potential for Falls  Goal: Patient will remain free of falls  Description: INTERVENTIONS:  - Educate patient/family on patient safety including physical limitations  - Instruct patient to call for assistance with activity   - Consult OT/PT to assist with strengthening/mobility   - Keep Call bell within reach  - Keep bed low and locked with side rails adjusted as appropriate  - Keep care items and personal belongings within reach  - Initiate and maintain comfort rounds  - Make Fall Risk Sign visible to staff  - Offer Toileting every 2 Hours, in advance of need  - Initiate/Maintain bed alarm  - Obtain necessary fall risk management equipment: walker   - Apply yellow socks and bracelet for high fall risk patients  - Consider moving patient to room near nurses station  Outcome: Progressing

## 2025-02-16 NOTE — PROGRESS NOTES
Progress Note - Hospitalist   Name: Verito Fallon 84 y.o. female I MRN: [id number]  Unit/Bed#: S -01 I Date of Admission: 2/4/2025   Date of Service: 2/16/2025 I Hospital Day: 11    Assessment & Plan  Metabolic encephalopathy  Patient with history of cognitive impairment presents to the ER on 302 due to concern regarding suicidal statements while she was visiting her  in the nursing home today. 302 not upheld by the ER provider as patient not currently expressing suicidal intent.   Patient's daughter states that Verito's cognition has been steadily declining for some time and has been recently exacerbated by her  being sick and placed in a nursing home. Prior to this, her  was primary caretaker. Patient has expressed suicidal intent in the past according to daughter however this has been used as a manipulative tactic.   Patient is currently oriented to self only. She currently denies SI/HI.   UA appears to be infected which may be exacerbating her confusion, urine culture shows E. coli  Completed 5 days of IV Ceftriaxone for suspected UTI on 2/8  Delirium precautions   Currently off virtual observation  Psychiatry consulted and ultimately had no further inpatient recommendations  Geriatrics consult appreciated   PT/OT-level 2 recommendations with AM-PAC score 17  Neuropsych consulted and currently determined patient has no capacity.    Pending placement     Dementia (HCC)  Alert and oriented x to self and location, however not to time or situation  High risk for delirium  Continue with delirium precautions  Encouraged avoidance of overstimulation/under stimulation of sensory organs including appropriate wake/sleep cycle, avoiding unnecessary sounds including call bells and additional monitors, ambient light, and appropriate room lighting/television usage for time of day.  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[facility])  Wt Readings from Last 3 Encounters:   02/16/25 50 kg (110 lb 3.7 oz)   09/27/24 53 kg (116 lb 12.8 oz)   08/16/24 55.4 kg (122 lb 1.6 oz)   Appears euvolemic   Continue PO bumex 2mg daily   I/O, Daily weights  Stable  A-fib ([facility])  Rate controlled   Continue Xarelto, Amiodarone, Toprol XL   PAD (peripheral artery disease) ([facility])  Continue Prasugrel, statin   Sick sinus syndrome ([facility])  S/p PPM   CAD (coronary artery disease)  S/p CABG   Continue Prasugrel, Xarelto, BB, statin   Chronic kidney disease, stage 3b ([facility])  Lab Results   Component Value Date    EGFR 56 02/14/2025    EGFR 51 02/12/2025    EGFR 54 02/07/2025    CREATININE 0.93 02/14/2025    CREATININE 1.00 02/12/2025    CREATININE 0.96 02/07/2025     Creatinine currently stable at baseline   Monitor BMP  UTI (urinary tract infection)  UA indicating UTI  Urine culture showing gram-negative rods, await for finalization  Started on IV ceftriaxone, will continue for 5 days total - completed on 2/8   Constipation  Continue a bowel regimen     VTE Pharmacologic Prophylaxis: VTE Score: 4 Moderate Risk (Score 3-4) - Pharmacological DVT Prophylaxis Ordered: rivaroxaban (Xarelto).    Mobility:   Basic Mobility Inpatient Raw Score: 18  JH-HLM Goal: 6: Walk 10 steps or more  JH-HLM Achieved: 7: Walk 25 feet or more  JH-HLM Goal achieved. Continue to encourage appropriate mobility.    Patient Centered Rounds: I performed bedside rounds with nursing staff today.   Discussions with Specialists or Other Care Team Provider: CM    Education and Discussions with Family / Patient: Attempted to update  () via phone. Unable to contact.    Current Length of Stay: 11 day(s)  Current Patient Status: Inpatient   Certification Statement: The patient will continue to require additional  inpatient hospital stay due to awaiting safe dispo  Discharge Plan: Anticipate discharge in 24-48 hrs to rehab facility.    Code Status: Level 3 - DNAR and DNI    Subjective   Patient was feeling well.  He denies any chest pain/pressure, palpitations, intense nausea, shortness of breath, or chills.    Objective :  Temp:  [98 °F (36.7 °C)-98.7 °F (37.1 °C)] 98.3 °F (36.8 °C)  HR:  [71-83] 71  BP: (108-148)/(58-66) 130/66  Resp:  [16-20] 18  SpO2:  [93 %-97 %] 93 %  O2 Device: None (Room air)    Body mass index is 22.26 kg/m².     Input and Output Summary (last 24 hours):     Intake/Output Summary (Last 24 hours) at 2/16/2025 1005  Last data filed at 2/15/2025 1731  Gross per 24 hour   Intake 150 ml   Output 300 ml   Net -150 ml       Physical Exam  Vitals and nursing note reviewed.   Constitutional:       General: She is not in acute distress.     Appearance: She is normal weight. She is not ill-appearing, toxic-appearing or diaphoretic.   HENT:      Head: Normocephalic.      Nose: Nose normal.      Mouth/Throat:      Mouth: Mucous membranes are moist.      Pharynx: Oropharynx is clear.   Eyes:      General: No scleral icterus.     Conjunctiva/sclera: Conjunctivae normal.      Pupils: Pupils are equal, round, and reactive to light.   Cardiovascular:      Rate and Rhythm: Normal rate and regular rhythm.      Heart sounds: No murmur heard.     No friction rub. No gallop.   Pulmonary:      Effort: Pulmonary effort is normal. No respiratory distress.      Breath sounds: Normal breath sounds. No stridor. No wheezing, rhonchi or rales.   Abdominal:      General: Abdomen is flat.      Palpations: Abdomen is soft.   Musculoskeletal:         General: Normal range of motion.      Cervical back: Normal range of motion and neck supple.      Right lower leg: No edema.      Left lower leg: No edema.   Lymphadenopathy:      Cervical: No cervical adenopathy.   Skin:     General: Skin is warm.      Coloration: Skin is not jaundiced  or pale.      Findings: No bruising, erythema or lesion.   Neurological:      General: No focal deficit present.      Mental Status: She is alert and oriented to person, place, and time. Mental status is at baseline.      Cranial Nerves: No cranial nerve deficit.      Motor: No weakness.   Psychiatric:         Mood and Affect: Mood normal.         Behavior: Behavior normal.         Thought Content: Thought content normal.           Lines/Drains:              Lab Results: I have reviewed the following results:   Results from last 7 days   Lab Units 02/14/25  1153   WBC Thousand/uL 5.55   HEMOGLOBIN g/dL 13.1   HEMATOCRIT % 40.3   PLATELETS Thousands/uL 169     Results from last 7 days   Lab Units 02/14/25  1153   SODIUM mmol/L 139   POTASSIUM mmol/L 3.2*   CHLORIDE mmol/L 100   CO2 mmol/L 32   BUN mg/dL 21   CREATININE mg/dL 0.93   ANION GAP mmol/L 7   CALCIUM mg/dL 8.6   GLUCOSE RANDOM mg/dL 114                       Recent Cultures (last 7 days):         Imaging Results Review: No pertinent imaging studies reviewed.  Other Study Results Review: No additional pertinent studies reviewed.    Last 24 Hours Medication List:     Current Facility-Administered Medications:     acetaminophen (TYLENOL) tablet 650 mg, Q6H PRN    acetaminophen (TYLENOL) tablet 975 mg, TID    amiodarone tablet 100 mg, Daily    atorvastatin (LIPITOR) tablet 20 mg, Daily With Dinner    bumetanide (BUMEX) tablet 2 mg, Daily    escitalopram (LEXAPRO) tablet 5 mg, Daily    hydrOXYzine HCL (ATARAX) tablet 25 mg, Q6H PRN    melatonin tablet 3 mg, HS    metoprolol succinate (TOPROL-XL) 24 hr tablet 25 mg, Daily    polyethylene glycol (MIRALAX) packet 17 g, Daily    prasugrel (EFFIENT) tablet 5 mg, Daily    rivaroxaban (XARELTO) tablet 15 mg, Daily With Breakfast    senna (SENOKOT) tablet 17.2 mg, BID    Administrative Statements   Today, Patient Was Seen By: Danny Church PA-C  I have spent a total time of 35 minutes in caring for this patient on  the day of the visit/encounter including Documenting in the medical record, Reviewing / ordering tests, medicine, procedures  , Obtaining or reviewing history  , and Communicating with other healthcare professionals .    **Please Note: This note may have been constructed using a voice recognition system.**

## 2025-02-17 LAB
ANION GAP SERPL CALCULATED.3IONS-SCNC: 8 MMOL/L (ref 4–13)
BUN SERPL-MCNC: 18 MG/DL (ref 5–25)
CALCIUM SERPL-MCNC: 9.1 MG/DL (ref 8.4–10.2)
CHLORIDE SERPL-SCNC: 99 MMOL/L (ref 96–108)
CO2 SERPL-SCNC: 32 MMOL/L (ref 21–32)
CREAT SERPL-MCNC: 1.08 MG/DL (ref 0.6–1.3)
ERYTHROCYTE [DISTWIDTH] IN BLOOD BY AUTOMATED COUNT: 13.9 % (ref 11.6–15.1)
GFR SERPL CREATININE-BSD FRML MDRD: 46 ML/MIN/1.73SQ M
GLUCOSE SERPL-MCNC: 90 MG/DL (ref 65–140)
HCT VFR BLD AUTO: 41.1 % (ref 34.8–46.1)
HGB BLD-MCNC: 13.7 G/DL (ref 11.5–15.4)
MCH RBC QN AUTO: 30.4 PG (ref 26.8–34.3)
MCHC RBC AUTO-ENTMCNC: 33.3 G/DL (ref 31.4–37.4)
MCV RBC AUTO: 91 FL (ref 82–98)
PLATELET # BLD AUTO: 198 THOUSANDS/UL (ref 149–390)
PMV BLD AUTO: 11.3 FL (ref 8.9–12.7)
POTASSIUM SERPL-SCNC: 3.3 MMOL/L (ref 3.5–5.3)
RBC # BLD AUTO: 4.51 MILLION/UL (ref 3.81–5.12)
SODIUM SERPL-SCNC: 139 MMOL/L (ref 135–147)
WBC # BLD AUTO: 5.03 THOUSAND/UL (ref 4.31–10.16)

## 2025-02-17 PROCEDURE — 85027 COMPLETE CBC AUTOMATED: CPT | Performed by: HOSPITALIST

## 2025-02-17 PROCEDURE — 80048 BASIC METABOLIC PNL TOTAL CA: CPT | Performed by: HOSPITALIST

## 2025-02-17 PROCEDURE — 99232 SBSQ HOSP IP/OBS MODERATE 35: CPT | Performed by: PHYSICIAN ASSISTANT

## 2025-02-17 RX ORDER — POTASSIUM CHLORIDE 1500 MG/1
40 TABLET, EXTENDED RELEASE ORAL ONCE
Status: COMPLETED | OUTPATIENT
Start: 2025-02-17 | End: 2025-02-17

## 2025-02-17 RX ADMIN — RIVAROXABAN 15 MG: 15 TABLET, FILM COATED ORAL at 09:51

## 2025-02-17 RX ADMIN — POTASSIUM CHLORIDE 40 MEQ: 1500 TABLET, EXTENDED RELEASE ORAL at 09:51

## 2025-02-17 RX ADMIN — MELATONIN 3 MG: 3 TAB ORAL at 22:12

## 2025-02-17 RX ADMIN — ACETAMINOPHEN 325 MG: 325 TABLET, FILM COATED ORAL at 22:12

## 2025-02-17 RX ADMIN — PRASUGREL 5 MG: 10 TABLET, FILM COATED ORAL at 09:53

## 2025-02-17 RX ADMIN — ESCITALOPRAM OXALATE 5 MG: 10 TABLET ORAL at 09:52

## 2025-02-17 RX ADMIN — AMIODARONE HYDROCHLORIDE 100 MG: 200 TABLET ORAL at 09:53

## 2025-02-17 RX ADMIN — ATORVASTATIN CALCIUM 20 MG: 20 TABLET, FILM COATED ORAL at 16:39

## 2025-02-17 NOTE — ASSESSMENT & PLAN NOTE
UA indicating UTI noted on admission  Urine culture --E. Coli  Started on IV ceftriaxone, will continue for 5 days total - completed on 2/8

## 2025-02-17 NOTE — ASSESSMENT & PLAN NOTE
Patient with history of cognitive impairment presented to the ER on 302 due to concern regarding suicidal statements while she was visiting her  in the nursing home.   Psychiatry consulted and ultimately had no further inpatient recommendations  Geriatrics consult appreciated --continue lexapro; as needed Atarax as needed for anxiety  Neuropsych consulted and currently determined patient has no capacity.    Pending placement

## 2025-02-17 NOTE — PLAN OF CARE
Problem: Potential for Falls  Goal: Patient will remain free of falls  Description: INTERVENTIONS:  - Educate patient/family on patient safety including physical limitations  - Instruct patient to call for assistance with activity   - Consult OT/PT to assist with strengthening/mobility   - Keep Call bell within reach  - Keep bed low and locked with side rails adjusted as appropriate  - Keep care items and personal belongings within reach  - Initiate and maintain comfort rounds  - Make Fall Risk Sign visible to staff  - Apply yellow socks and bracelet for high fall risk patients  - Consider moving patient to room near nurses station  Outcome: Progressing     Problem: NEUROSENSORY - ADULT  Goal: Achieves stable or improved neurological status  Description: INTERVENTIONS  - Monitor and report changes in neurological status  - Monitor vital signs such as temperature, blood pressure, glucose, and any other labs ordered   - Initiate measures to prevent increased intracranial pressure  - Monitor for seizure activity and implement precautions if appropriate      Outcome: Progressing  Goal: Remains free of injury related to seizures activity  Description: INTERVENTIONS  - Maintain airway, patient safety  and administer oxygen as ordered  - Monitor patient for seizure activity, document and report duration and description of seizure to physician/advanced practitioner  - If seizure occurs,  ensure patient safety during seizure  - Reorient patient post seizure  - Seizure pads on all 4 side rails  - Instruct patient/family to notify RN of any seizure activity including if an aura is experienced  - Instruct patient/family to call for assistance with activity based on nursing assessment  - Administer anti-seizure medications if ordered    Outcome: Progressing  Goal: Achieves maximal functionality and self care  Description: INTERVENTIONS  - Monitor swallowing and airway patency with patient fatigue and changes in neurological  status  - Encourage and assist patient to increase activity and self care.   - Encourage visually impaired, hearing impaired and aphasic patients to use assistive/communication devices  Outcome: Progressing

## 2025-02-17 NOTE — ASSESSMENT & PLAN NOTE
Wt Readings from Last 3 Encounters:   02/16/25 50 kg (110 lb 3.7 oz)   09/27/24 53 kg (116 lb 12.8 oz)   08/16/24 55.4 kg (122 lb 1.6 oz)   Appears euvolemic   Continue PO bumex 2mg daily   I/O, Daily weights

## 2025-02-17 NOTE — PROGRESS NOTES
Progress Note - Hospitalist   Name: Verito Fallon 84 y.o. female I MRN: [id number]  Unit/Bed#: S -01 I Date of Admission: 2/4/2025   Date of Service: 2/17/2025 I Hospital Day: 12    Assessment & Plan  Metabolic encephalopathy  Patient with history of cognitive impairment presented to the ER on 302 due to concern regarding suicidal statements while she was visiting her  in the nursing home.   Psychiatry consulted and ultimately had no further inpatient recommendations  Geriatrics consult appreciated --continue lexapro; as needed Atarax as needed for anxiety  Neuropsych consulted and currently determined patient has no capacity.    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 Coli  Started on IV ceftriaxone, will continue for 5 days total - completed on 2/8   Hypokalemia  K 3.3--replete and follow    VTE Pharmacologic Prophylaxis: VTE Score: 4 Xarelto    Mobility:   Basic Mobility Inpatient Raw Score: 19  JH-HLM Goal: 6: Walk 10 steps or more  JH-HLM Achieved: 6: Walk 10 steps or more  JH-HLM Goal achieved. Continue to encourage appropriate mobility.    Patient Centered Rounds: Spoke with RN  Discussions with Specialists or Other Care Team Provider: case mgmt    Education and Discussions with Family / Patient: Updated  (son) at bedside.  He was inquiring to me why patient had a couple days last week of improved mental clarity    Current  Length of Stay: 12 day(s)  Current Patient Status: Inpatient   Certification Statement: The patient will continue to require additional inpatient hospital stay due to pending placement  Discharge Plan: Medically stable for discharge when placement available by case management    Code Status: Level 3 - DNAR and DNI    Subjective   Patient offers no complaints or concerns to me. Son reported patient has hearing difficulties and ringing in ears    Objective :  Temp:  [97.9 °F (36.6 °C)-98.4 °F (36.9 °C)] 97.9 °F (36.6 °C)  HR:  [75-91] 79  BP: ()/(49-79) 150/63  Resp:  [16-20] 16  SpO2:  [93 %-98 %] 98 %  O2 Device: None (Room air)    Body mass index is 22.26 kg/m².     Input and Output Summary (last 24 hours):     Intake/Output Summary (Last 24 hours) at 2/17/2025 1150  Last data filed at 2/16/2025 1606  Gross per 24 hour   Intake 240 ml   Output --   Net 240 ml       Physical Exam  Vitals reviewed.   Constitutional:       General: She is not in acute distress.     Appearance: Normal appearance. She is not ill-appearing, toxic-appearing or diaphoretic.   HENT:      Ears:      Comments: Hearing grossly appears to be intact to normal voice  Eyes:      General:         Right eye: No discharge.      Conjunctiva/sclera: Conjunctivae normal.   Cardiovascular:      Rate and Rhythm: Normal rate and regular rhythm.      Heart sounds: No murmur heard.  Pulmonary:      Effort: No respiratory distress.      Breath sounds: No stridor. No wheezing or rhonchi.   Abdominal:      General: There is no distension.      Palpations: Abdomen is soft.      Tenderness: There is no abdominal tenderness. There is no guarding.   Musculoskeletal:      Right lower leg: No edema.      Left lower leg: No edema.   Skin:     General: Skin is warm and dry.      Coloration: Skin is not jaundiced or pale.      Findings: No bruising, erythema, lesion or rash.   Neurological:      Mental Status: She is alert.      Comments: Awake alert interactive  patient was able to introduce me to her son at bedside   Psychiatric:      Comments: Patient was calm and cooperative when I entered, mood was stable.  She did appear to become more frustrated and aggrevated by her son talking about her in front of her           Lines/Drains:        Lab Results: I have reviewed the following results:   Results from last 7 days   Lab Units 02/17/25  0134   WBC Thousand/uL 5.03   HEMOGLOBIN g/dL 13.7   HEMATOCRIT % 41.1   PLATELETS Thousands/uL 198     Results from last 7 days   Lab Units 02/17/25  0134   SODIUM mmol/L 139   POTASSIUM mmol/L 3.3*   CHLORIDE mmol/L 99   CO2 mmol/L 32   BUN mg/dL 18   CREATININE mg/dL 1.08   ANION GAP mmol/L 8   CALCIUM mg/dL 9.1   GLUCOSE RANDOM mg/dL 90                       Recent Cultures (last 7 days):           Last 24 Hours Medication List:     Current Facility-Administered Medications:     acetaminophen (TYLENOL) tablet 650 mg, Q6H PRN    acetaminophen (TYLENOL) tablet 975 mg, TID    amiodarone tablet 100 mg, Daily    atorvastatin (LIPITOR) tablet 20 mg, Daily With Dinner    bumetanide (BUMEX) tablet 2 mg, Daily    escitalopram (LEXAPRO) tablet 5 mg, Daily    hydrOXYzine HCL (ATARAX) tablet 25 mg, Q6H PRN    melatonin tablet 3 mg, HS    metoprolol succinate (TOPROL-XL) 24 hr tablet 25 mg, Daily    polyethylene glycol (MIRALAX) packet 17 g, Daily    prasugrel (EFFIENT) tablet 5 mg, Daily    rivaroxaban (XARELTO) tablet 15 mg, Daily With Breakfast    senna (SENOKOT) tablet 17.2 mg, BID    Administrative Statements   Today, Patient Was Seen By: Mary Hutson PA-C      **Please Note: This note may have been constructed using a voice recognition system.**

## 2025-02-18 PROBLEM — N18.9 CKD (CHRONIC KIDNEY DISEASE): Status: ACTIVE | Noted: 2025-02-18

## 2025-02-18 PROCEDURE — 99232 SBSQ HOSP IP/OBS MODERATE 35: CPT | Performed by: PHYSICIAN ASSISTANT

## 2025-02-18 PROCEDURE — 99232 SBSQ HOSP IP/OBS MODERATE 35: CPT | Performed by: FAMILY MEDICINE

## 2025-02-18 PROCEDURE — 97168 OT RE-EVAL EST PLAN CARE: CPT

## 2025-02-18 RX ADMIN — ESCITALOPRAM OXALATE 5 MG: 10 TABLET ORAL at 08:49

## 2025-02-18 RX ADMIN — HYDROXYZINE HYDROCHLORIDE 25 MG: 25 TABLET ORAL at 08:49

## 2025-02-18 RX ADMIN — AMIODARONE HYDROCHLORIDE 100 MG: 200 TABLET ORAL at 08:49

## 2025-02-18 RX ADMIN — PRASUGREL 5 MG: 10 TABLET, FILM COATED ORAL at 08:51

## 2025-02-18 RX ADMIN — RIVAROXABAN 15 MG: 15 TABLET, FILM COATED ORAL at 08:49

## 2025-02-18 RX ADMIN — ATORVASTATIN CALCIUM 20 MG: 20 TABLET, FILM COATED ORAL at 17:04

## 2025-02-18 NOTE — PROGRESS NOTES
Progress Note - Geriatric Medicine   Name: Verito Fallon 84 y.o. female I MRN: [id number]  Unit/Bed#: S -01 I Date of Admission: 2/4/2025   Date of Service: 2/18/2025 I Hospital Day: 13     Assessment & Plan  Metabolic encephalopathy  Improved, I believe she is close to baseline currently   Patient is high risk of delirium due to dementia, hospitalization, and metabolic imbalance, UTI  Initiate delirium precautions  maintain normal sleep/wake cycle  minimize overnight interruptions, group overnight vitals/labs/nursing checks as possible  dim lights, close blinds and turn off tv to minimize stimulation and encourage sleep environment in evenings  ensure that pain is well controlled consider Tylenol 975mg Q8H scheduled   monitor for fecal and urinary retention which may precipitate delirium  encourage early mobilization and ambulation  provide frequent reorientation and redirection  encourage family and friends at the bedside to help calm patient if anxious  avoid medications which may precipitate or worsen delirium such as tramadol, benzodiazepine, anticholinergics, and antihistaminics  encourage hydration and nutrition , assist with feeding if needed  redirect unwanted behaviors as first line, avoid physical restraints.  Patient currently AAO x 2 and confused as to where her  is  Completed 5 days of IV ceftriaxone  Monitor for urinary retention  Zyprexa discontinued  I would recommend avoid use of hydroxyzine  Consider adding gabapentin 100 mg twice daily for anxiety  UTI (urinary tract infection)  Resolved  Hypokalemia  -2/17/25 Potassium: 3.3  -Continue to monitor  -Continue to monitor outpatient as well  Dementia (HCC)   In 2021 patient scored 18/30 on MoCA  CT head shows moderate microangiopathic changes   Mini Cog 3/5 during this hospitalization  Patient alert to person and place on exam  Pt. Lives at home by herself-would recommend placement  Independent for instrumental ADLs. She used to have a  home health aide, but recently fired her.  Independent for ADLs  No behaviors  TSH 3.005  Recommend checking B12  [place] to person, place, time, and situation as needed  Monitor for behaviors   Monitor for mental status change  Provide supportive care   Sertraline was switched to Lexapro  Continue delirium precautions as above  CKD (chronic kidney disease)  Lab Results   Component Value Date    EGFR 46 02/17/2025    EGFR 56 02/14/2025    EGFR 51 02/12/2025    CREATININE 1.08 02/17/2025    CREATININE 0.93 02/14/2025    CREATININE 1.00 02/12/2025       Creatinine stable.  Will avoid nephrotoxic medication.  Encourage po hydration.  Will monitor BMP.         Subjective: Patient is an 84 year old female that reports no symptoms or concerns. She denies headache, dizziness, chest pain, palpitations, SOB, abdominal pain, dysuria, constipation, appetite changes, and sleep disturbances. She is utilizing a walker to get to and from the restroom. She is alert and oriented x 2. She recalls her birthday and where she is located. She states she is confused but is unsure what she is confused about. She does not recall her last bowel movement or what she ate for lunch. Her children did visit earlier today and informed her of her husbands passing, however, she does not recall these events.       Review of Systems   Constitutional:  Negative for chills and fever.   HENT:  Negative for congestion and rhinorrhea.    Eyes:  Negative for visual disturbance.   Respiratory:  Negative for cough, shortness of breath and wheezing.    Cardiovascular:  Negative for chest pain, palpitations and leg swelling.   Gastrointestinal:  Negative for abdominal pain and constipation.   Endocrine: Negative for cold intolerance.   Genitourinary:  Negative for difficulty urinating, dysuria and hematuria.   Musculoskeletal:  Negative for gait problem.   Skin:  Negative for wound.   Allergic/Immunologic: Negative for environmental allergies.   Neurological:   Negative for dizziness and seizures.   Hematological:  Does not bruise/bleed easily.   Psychiatric/Behavioral:  Positive for confusion. Negative for behavioral problems and sleep disturbance.          Objective:     Vitals: Blood pressure 100/65, pulse 71, temperature 98.3 °F (36.8 °C), temperature source Oral, resp. rate 18, weight 52 kg (114 lb 10.2 oz), SpO2 97%, not currently breastfeeding.,Body mass index is 23.15 kg/m².    No intake or output data in the 24 hours ending 02/18/25 1516    Current Medications: Reviewed    Physical Exam:   Physical Exam  Vitals and nursing note reviewed.   Constitutional:       General: She is not in acute distress.     Appearance: She is well-developed. She is not diaphoretic.   HENT:      Head: Normocephalic and atraumatic.      Right Ear: External ear normal.   Eyes:      General:         Right eye: No discharge.         Left eye: No discharge.      Conjunctiva/sclera: Conjunctivae normal.      Pupils: Pupils are equal, round, and reactive to light.   Cardiovascular:      Rate and Rhythm: Normal rate and regular rhythm.      Heart sounds: Murmur heard.      No friction rub. No gallop.   Pulmonary:      Effort: Pulmonary effort is normal. No respiratory distress.      Breath sounds: Normal breath sounds. No wheezing.   Chest:      Chest wall: No tenderness.   Abdominal:      General: Bowel sounds are normal. There is no distension.      Palpations: Abdomen is soft. There is no mass.      Tenderness: There is no abdominal tenderness. There is no guarding or rebound.   Musculoskeletal:         General: No tenderness or deformity. Normal range of motion.      Cervical back: Normal range of motion and neck supple.   Skin:     General: Skin is warm and dry.      Findings: No erythema or rash.   Neurological:      Mental Status: She is alert. She is disoriented.      Comments: Alert and oriented x 2   Psychiatric:         Behavior: Behavior normal.          Invasive Devices        None                   Lab, Imaging and other studies: Results Review Statement: I reviewed radiology reports from this admission including: CT head.

## 2025-02-18 NOTE — OCCUPATIONAL THERAPY NOTE
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)    A-fib (HCC)    PAD (peripheral artery disease) (HCC)    UTI (urinary tract infection)    CAD (coronary artery disease)    Hypokalemia        02/18/25 1007   OT Last Visit   OT Visit Date 02/18/25   Note Type   Note type Re-Evaluation   Pain Assessment   Pain Assessment Tool 0-10   Pain Score No Pain   Restrictions/Precautions   Weight Bearing Precautions Per Order No   Other Precautions Cognitive;Chair Alarm;Bed Alarm;Fall Risk   Home Living   Additional Comments Please refer to initial OT evaluation performed on 2/5 for home setup   Prior Function   Comments Please refer to initial OT evaluation performed on 2/5 for PLOF   Lifestyle   Autonomy PTA pt living alone in 1SH, pt (I) with ADLS and IADLs, (-)falls, (+)drives no use of AD at baseline   Reciprocal Relationships supportive  - however currently at [employer]   Service to Others retired   Intrinsic Gratification spending time with    General   Additional Pertinent History Admit due to confusion and disorientation. Admitted for hopes of 302- however not upheld due to lack of suicidal ideation. Currently being optioned for placement. PMH: a fib, SSS, CAD, CKD, PAD   Family/Caregiver Present No   Subjective   Subjective \"Did something happen to my , I hope he's ok\"   ADL   Where Assessed Standing at sink   Eating Assistance 7  Independent   Eating Deficit Beverage management   Grooming Assistance 5  Supervision/Setup   Grooming Deficit Setup;Verbal cueing;Supervision/safety;Standing with assistive device;Wash/dry hands;Teeth care;Brushing hair  (VC for redirection to task and sequencing after being given multistep command, forgot to apply toothpaste)   UB Bathing Assistance 5  Supervision/Setup   LB " Bathing Assistance 5  Supervision/Setup   UB Dressing Assistance 5  Supervision/Setup   UB Dressing Deficit Setup;Supervision/safety;Thread RUE;Thread LUE;Pull around back;Fasteners  (don robe in stance w/o UE support, able to tie)   LB Dressing Assistance 5  Supervision/Setup   Toileting Assistance  4  Minimal Assistance   Toileting Deficit Setup;Supervison/safety;Increased time to complete;Clothing management down  (A to manage gown out of way for toileting, VC to initiate underwear management down)   Bed Mobility   Additional Comments Pt received in recliner upon arrival, returned at end of session   Transfers   Sit to Stand 5  Supervision   Additional items Assist x 1;Armrests;Increased time required   Stand to Sit 5  Supervision   Additional items Assist x 1;Armrests   Toilet transfer 5  Supervision   Additional items Assist x 1;Increased time required;Standard toilet  (use of grab bar R)   Additional Comments to RW, improved carryover of hand placement/safety   Functional Mobility   Functional Mobility 5  Supervision   Additional Comments Household distance to bathroom w/ RW and S, additional community distance to hallway closet to retrieve robe w/ S. Improved RW management/safety   Additional items Rolling walker   Balance   Static Sitting Good   Dynamic Sitting Fair +   Static Standing Fair   Dynamic Standing Fair -   Activity Tolerance   Activity Tolerance Other (Comment)  (limited by cognition)   Medical Staff Made Aware Spoke to BRIANA Huggins   Nurse Made Aware yes, RN ok to see pt   RUE Assessment   RUE Assessment WFL   LUE Assessment   LUE Assessment WFL   Hand Function   Gross Motor Coordination Functional   Fine Motor Coordination Functional   Sensation   Light Touch No apparent deficits   Vision-Basic Assessment   Current Vision Wears glasses only for reading   Cognition   Overall Cognitive Status Impaired   Arousal/Participation Alert;Cooperative   Attention Attends with cues to redirect   Orientation  Level Oriented to person;Oriented to place;Disoriented to time;Disoriented to situation   Memory Decreased short term memory;Decreased recall of recent events  (highly repetative)   Following Commands Follows one step commands without difficulty   Comments Pleasant and agreeable. Highly repetative, frequently asking where her  is, if anyone has seen him and if he was ok. Unable to follow multistep commands, completes first step of command then requires cueing/redirection for completion of next stage. No capacity per neuropsych.   Assessment   Limitation Decreased Safe judgement during ADL;Decreased cognition;Decreased self-care trans;Decreased high-level ADLs  (impaired balance, fxnl mobility, and standing tor, attention to task, direction following, safety awareness, insight, and problem solving, response time)   Prognosis Good   Assessment Pt is a 84 y.o. female admitted to [facility] on 2025 due to . Pt seen on this date for OT Re-Evaluation due to  goals. Please refer to H&P/ initial OT eval () for detailed PMH and social history. At baseline pt living alone in 1SH, pt (I) with ADLS and IADLs, (-) falls, (+) drives no use of AD at baseline. Upon re-eval pt performed as is listed above, demonstrating improved overall performance of ADLs requiring less physical assist and VC as well as improved mobility tolerance > household distances however continues to largely be limited by cognitive decline and inability to make safe decisions. Pt would benefit from continued skilled OT treatment in order to maximize safety, independence and overall performance with ADLs, functional transfers, functional mobility and continue cognitive assessment/intervention in order to achieve highest level of function. Updated goals are listed below   Goals   Patient Goals to talk to her    [facility] Time Frame 10-14   Long Term Goal #1 see updated goals below   Plan   Treatment Interventions ADL retraining;Functional  transfer training;Cognitive reorientation;Patient/family training;Equipment evaluation/education;Compensatory technique education;Continued evaluation   Goal Expiration Date 02/28/25   OT Treatment Day 2   OT Frequency 2-3x/wk   Discharge Recommendation   Rehab Resource Intensity Level, OT (S)  II (Moderate Resource Intensity)  (anticipate pt may progress to level III in next session. Pt may DC w/ level III services pending level of social support available upon DC for increased level of supervision due to cognitive deficits.)   Equipment Recommended Bedside commode   Commode Type Standard   AM-PAC Daily Activity Inpatient   Lower Body Dressing 3   Bathing 3   Toileting 3   Upper Body Dressing 4   Grooming 3   Eating 4   Daily Activity Raw Score 20   Daily Activity Standardized Score (Calc for Raw Score >=11) 42.03   AM-PAC Applied Cognition Inpatient   Following a Speech/Presentation 2   Understanding Ordinary Conversation 3   Taking Medications 2   Remembering Where Things Are Placed or Put Away 3   Remembering List of 4-5 Errands 1   Taking Care of Complicated Tasks 1   Applied Cognition Raw Score 12   Applied Cognition Standardized Score 28.82   End of Consult   Patient Position at End of Consult Bedside chair;Bed/Chair alarm activated;All needs within reach   Nurse Communication Nurse aware of consult     GOALS:    *Goals established to promote patient goal of to see my :      *Patient will perform grooming tasks standing at sink with (I) in order to increase overall independence    *LB ADL with (I) for inc'd independence with self care and decreased caregiver burden    *Toileting with (I) for clothing management and hygiene to increase hygiene/thoroughness and reduce caregiver burden    *ADL transfers with (I) for inc'd independence with ADLs/purposeful tasks    *Patient will increase functional mobility to community distance with least restrictive assistive device independently to increase independence  community accessibility and participation in meaningful tasks    *Increase stand tolerance x 8-10  m for inc'd tolerance with standing purposeful tasks including light meal prep/household management    *Patient will engage in ongoing cognitive assessment to assist with safe discharge planning/recommendations.     *Patient will orient self x 4 with minimal verbal cues to increase overall awareness and promote safety with ADL/IADL tasks.     *Pt will consistently follow multi step directions during ADL performance w/ % accuracy to max I and safety w/ ADL/IADL performance    *Pt will attend to treatment task or activity for 10 minutes without need for redirection to improve activity engagement within 10 days.     *Patient will complete leisure interest checklist to encourage participation in meaningful activities and promote cognitive enhancement    The patient's raw score on the -PAC Daily Activity Inpatient Short Form is 20. A raw score of greater than or equal to 19 suggests the patient may benefit from discharge to home. Please also refer to the recommendation of the Occupational Therapist for safe discharge planning.      WALLY Rincon, OTR/L    [place] License [id number]  [place] License [id number]

## 2025-02-18 NOTE — ASSESSMENT & PLAN NOTE
Patient with history of cognitive impairment presented to the ER on 302 due to concern regarding suicidal statements while she was visiting her  in the nursing home.   Psychiatry consulted and ultimately had no further inpatient recommendations  Geriatrics consult appreciated --continue lexapro; as needed Atarax as needed for anxiety  I was notified that patient's  just passed away this morning.  Continue to provide emotional support  Neuropsych consulted and currently determined patient has no capacity.    Pending placement

## 2025-02-18 NOTE — PLAN OF CARE
Problem: Potential for Falls  Goal: Patient will remain free of falls  Description: INTERVENTIONS:  - Educate patient/family on patient safety including physical limitations  - Instruct patient to call for assistance with activity   - Consult OT/PT to assist with strengthening/mobility   - Keep Call bell within reach  - Keep bed low and locked with side rails adjusted as appropriate  - Keep care items and personal belongings within reach  - Initiate and maintain comfort rounds  - Make Fall Risk Sign visible to staff  - Offer Toileting every 2 Hours, in advance of need  - Initiate/Maintain bedalarm  - Obtain necessary fall risk management equipment: walker  - Apply yellow socks and bracelet for high fall risk patients  - Consider moving patient to room near nurses station  Outcome: Progressing     Problem: NEUROSENSORY - ADULT  Goal: Achieves stable or improved neurological status  Description: INTERVENTIONS  - Monitor and report changes in neurological status  - Monitor vital signs such as temperature, blood pressure, glucose, and any other labs ordered   - Initiate measures to prevent increased intracranial pressure  - Monitor for seizure activity and implement precautions if appropriate      Outcome: Progressing  Goal: Remains free of injury related to seizures activity  Description: INTERVENTIONS  - Maintain airway, patient safety  and administer oxygen as ordered  - Monitor patient for seizure activity, document and report duration and description of seizure to physician/advanced practitioner  - If seizure occurs,  ensure patient safety during seizure  - Reorient patient post seizure  - Seizure pads on all 4 side rails  - Instruct patient/family to notify RN of any seizure activity including if an aura is experienced  - Instruct patient/family to call for assistance with activity based on nursing assessment  - Administer anti-seizure medications if ordered    Outcome: Progressing  Goal: Achieves maximal  functionality and self care  Description: INTERVENTIONS  - Monitor swallowing and airway patency with patient fatigue and changes in neurological status  - Encourage and assist patient to increase activity and self care.   - Encourage visually impaired, hearing impaired and aphasic patients to use assistive/communication devices  Outcome: Progressing

## 2025-02-18 NOTE — PROGRESS NOTES
Progress Note - Hospitalist   Name: Verito Fallon 84 y.o. female I MRN: [id number]  Unit/Bed#: S -01 I Date of Admission: 2/4/2025   Date of Service: 2/18/2025 I Hospital Day: 13    Assessment & Plan  Metabolic encephalopathy  Patient with history of cognitive impairment presented to the ER on 302 due to concern regarding suicidal statements while she was visiting her  in the nursing home.   Psychiatry consulted and ultimately had no further inpatient recommendations  Geriatrics consult appreciated --continue lexapro; as needed Atarax as needed for anxiety  I was notified that patient's  just passed away this morning.  Continue to provide emotional support  Neuropsych consulted and currently determined patient has no capacity.    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 Coli  Started on IV ceftriaxone, will continue for 5 days total - completed on 2/8   Hypokalemia  K 3.3--repletion ordered; recheck in a.m.    VTE Pharmacologic Prophylaxis: VTE Score: 4 Xarelto    Mobility:   Basic Mobility Inpatient Raw Score: 19  JH-HLM Goal: 6: Walk 10 steps or more  JH-HLM Achieved: 7: Walk 25 feet or more  JH-HLM Goal achieved. Continue to encourage appropriate mobility.    Patient Centered Rounds: I performed bedside rounds with nursing staff today.   Discussions with Specialists or Other Care Team Provider: Case management    Education and  Discussions with Family / Patient:     Current Length of Stay: 13 day(s)  Current Patient Status: Inpatient   Certification Statement: The patient will continue to require additional inpatient hospital stay due to pending paperwork from the state  Discharge Plan:  Unknown timeframe for discharge    Code Status: Level 3 - DNAR and DNI    Subjective   I evaluated the patient in the morning and noted she had no complaints or concerns.  Denies any hearing deficits.  Not reporting any pain.  Eating and drinking fine    Objective :  Temp:  [97.3 °F (36.3 °C)-98.3 °F (36.8 °C)] 97.3 °F (36.3 °C)  HR:  [67-81] 81  BP: ()/(57-75) 109/75  Resp:  [17-18] 17  SpO2:  [96 %-97 %] 97 %  O2 Device: None (Room air)    Body mass index is 23.15 kg/m².     Input and Output Summary (last 24 hours):   No intake or output data in the 24 hours ending 02/18/25 1403    Physical Exam  Vitals reviewed.   Constitutional:       General: She is not in acute distress.     Appearance: Normal appearance. She is not ill-appearing, toxic-appearing or diaphoretic.   Eyes:      General: No scleral icterus.        Right eye: No discharge.         Left eye: No discharge.      Conjunctiva/sclera: Conjunctivae normal.   Cardiovascular:      Rate and Rhythm: Normal rate and regular rhythm.      Heart sounds: No murmur heard.  Pulmonary:      Effort: No respiratory distress.      Breath sounds: No stridor. No wheezing, rhonchi or rales.   Abdominal:      General: There is no distension.      Palpations: Abdomen is soft.      Tenderness: There is no abdominal tenderness. There is no guarding.   Musculoskeletal:      Right lower leg: No edema.      Left lower leg: No edema.   Skin:     General: Skin is warm and dry.      Coloration: Skin is not jaundiced or pale.      Findings: No bruising, erythema, lesion or rash.   Neurological:      General: No focal deficit present.      Mental Status: She is alert. Mental status is at baseline.      Comments: Awake  alert interactive.  Pleasant and cooperative.  Follows commands appropriately.  Oriented to current month, location and able to tell me her whole name.  Could not recall the current president, thought it was [employer].  Could not recall the current year   Psychiatric:         Mood and Affect: Mood normal.         Thought Content: Thought content normal.       Lines/Drains:        Lab Results: I have reviewed the following results:   Results from last 7 days   Lab Units 02/17/25  0134   WBC Thousand/uL 5.03   HEMOGLOBIN g/dL 13.7   HEMATOCRIT % 41.1   PLATELETS Thousands/uL 198     Results from last 7 days   Lab Units 02/17/25  0134   SODIUM mmol/L 139   POTASSIUM mmol/L 3.3*   CHLORIDE mmol/L 99   CO2 mmol/L 32   BUN mg/dL 18   CREATININE mg/dL 1.08   ANION GAP mmol/L 8   CALCIUM mg/dL 9.1   GLUCOSE RANDOM mg/dL 90                       Recent Cultures (last 7 days):           Last 24 Hours Medication List:     Current Facility-Administered Medications:     acetaminophen (TYLENOL) tablet 650 mg, Q6H PRN    acetaminophen (TYLENOL) tablet 975 mg, TID    amiodarone tablet 100 mg, Daily    atorvastatin (LIPITOR) tablet 20 mg, Daily With Dinner    bumetanide (BUMEX) tablet 2 mg, Daily    escitalopram (LEXAPRO) tablet 5 mg, Daily    hydrOXYzine HCL (ATARAX) tablet 25 mg, Q6H PRN    melatonin tablet 3 mg, HS    metoprolol succinate (TOPROL-XL) 24 hr tablet 25 mg, Daily    polyethylene glycol (MIRALAX) packet 17 g, Daily    prasugrel (EFFIENT) tablet 5 mg, Daily    rivaroxaban (XARELTO) tablet 15 mg, Daily With Breakfast    senna (SENOKOT) tablet 17.2 mg, BID    Administrative Statements   Today, Patient Was Seen By: Mary Hutson PA-C      **Please Note: This note may have been constructed using a voice recognition system.**

## 2025-02-18 NOTE — ASSESSMENT & PLAN NOTE
In 2021 patient scored 18/30 on MoCA  CT head shows moderate microangiopathic changes   Mini Cog 3/5 during this hospitalization  Patient alert to person and place on exam  Pt. Lives at home by herself-would recommend placement  Independent for instrumental ADLs. She used to have a home health aide, but recently fired her.  Independent for ADLs  No behaviors  TSH 3.005  Recommend checking B12  [place] to person, place, time, and situation as needed  Monitor for behaviors   Monitor for mental status change  Provide supportive care   Sertraline was switched to Lexapro  Continue delirium precautions as above

## 2025-02-18 NOTE — ASSESSMENT & PLAN NOTE
Wt Readings from Last 3 Encounters:   02/18/25 52 kg (114 lb 10.2 oz)   09/27/24 53 kg (116 lb 12.8 oz)   08/16/24 55.4 kg (122 lb 1.6 oz)   Appears euvolemic   Continue PO bumex 2mg daily   I/O, Daily weights

## 2025-02-18 NOTE — ASSESSMENT & PLAN NOTE
Lab Results   Component Value Date    EGFR 46 02/17/2025    EGFR 56 02/14/2025    EGFR 51 02/12/2025    CREATININE 1.08 02/17/2025    CREATININE 0.93 02/14/2025    CREATININE 1.00 02/12/2025       Creatinine stable.  Will avoid nephrotoxic medication.  Encourage po hydration.  Will monitor BMP.

## 2025-02-18 NOTE — CASE MANAGEMENT
Case Management Progress Note    Patient name Verito Fallon  Location S /S -01 MRN [id number]  : 1940 Date 2025       LOS (days): 13  Geometric Mean LOS (GMLOS) (days): 3.8  Days to GMLOS:-9.2        OBJECTIVE:        Current admission status: Inpatient  Preferred Pharmacy:   [id number] [place] - [address]  [address] [place] [address]  Phone: [phone] Fax: [phone]    [facility] [place], [place] - [address]  [address] [facility] [address]  Phone: [phone] Fax: [phone]    Primary Care Provider: Robbie Warner DO    Primary Insurance: MEDICARE  Secondary Insurance: AETNA    PROGRESS NOTE:    Weekly Care Management Length of Stay Review     Current LOS: 13 Days    Most Recent Labs:     Lab Results   Component Value Date/Time    WBC 5.03 2025 01:34 AM    HGB 13.7 2025 01:34 AM    HCT 41.1 2025 01:34 AM     2025 01:34 AM    SODIUM 139 2025 01:34 AM    K 3.3 (L) 2025 01:34 AM    CL 99 2025 01:34 AM    CO2 32 2025 01:34 AM    BUN 18 2025 01:34 AM    CREATININE 1.08 2025 01:34 AM    GLUC 90 2025 01:34 AM       Most Recent Vitals:   Vitals:    25 0737   BP: 109/75   Pulse: 81   Resp: 17   Temp: (!) 97.3 °F (36.3 °C)   SpO2: 97%        Identified Barriers to Discharge/Discharge Goals/Care Management Interventions:  metabolic encephalopathy, medically stable.     Intended Discharge Disposition: Awaiting options paperwork. [facility] did not send all correct paperwork. [facility] working with [facility] to correct.     Expected Discharge Date:  TBD

## 2025-02-18 NOTE — ASSESSMENT & PLAN NOTE
Improved, I believe she is close to baseline currently   Patient is high risk of delirium due to dementia, hospitalization, and metabolic imbalance, UTI  Initiate delirium precautions  maintain normal sleep/wake cycle  minimize overnight interruptions, group overnight vitals/labs/nursing checks as possible  dim lights, close blinds and turn off tv to minimize stimulation and encourage sleep environment in evenings  ensure that pain is well controlled consider Tylenol 975mg Q8H scheduled   monitor for fecal and urinary retention which may precipitate delirium  encourage early mobilization and ambulation  provide frequent reorientation and redirection  encourage family and friends at the bedside to help calm patient if anxious  avoid medications which may precipitate or worsen delirium such as tramadol, benzodiazepine, anticholinergics, and antihistaminics  encourage hydration and nutrition , assist with feeding if needed  redirect unwanted behaviors as first line, avoid physical restraints.  Patient currently AAO x 2 and confused as to where her  is  Completed 5 days of IV ceftriaxone  Monitor for urinary retention  Zyprexa discontinued  I would recommend avoid use of hydroxyzine  Consider adding gabapentin 100 mg twice daily for anxiety

## 2025-02-18 NOTE — PLAN OF CARE
Problem: OCCUPATIONAL THERAPY ADULT  Goal: Performs self-care activities at highest level of function for planned discharge setting.  See evaluation for individualized goals.  Description: Treatment Interventions: ADL retraining, Functional transfer training, Endurance training, Cognitive reorientation, Patient/family training, Equipment evaluation/education, Compensatory technique education, Energy conservation, Activityengagement          See flowsheet documentation for full assessment, interventions and recommendations.   Outcome: Progressing  Note: Limitation: Decreased Safe judgement during ADL, Decreased cognition, Decreased self-care trans, Decreased high-level ADLs (impaired balance, fxnl mobility, and standing tor, attention to task, direction following, safety awareness, insight, and problem solving, response time)  Prognosis: Good  Assessment: Pt is a 84 y.o. female admitted to [facility] on 2025 due to . Pt seen on this date for OT Re-Evaluation due to  goals. Please refer to H&P/ initial OT eval () for detailed PMH and social history. At baseline pt living alone in 1SH, pt (I) with ADLS and IADLs, (-) falls, (+) drives no use of AD at baseline. Upon re-eval pt performed as is listed above, demonstrating improved overall performance of ADLs requiring less physical assist and VC as well as improved mobility tolerance > household distances however continues to largely be limited by cognitive decline and inability to make safe decisions. Pt would benefit from continued skilled OT treatment in order to maximize safety, independence and overall performance with ADLs, functional transfers, functional mobility and continue cognitive assessment/intervention in order to achieve highest level of function. Updated goals are listed below     Rehab Resource Intensity Level, OT: (S) II (Moderate Resource Intensity) (anticipate pt may progress to level III in next session. Pt may DC w/ level III services  pending level of social support available upon DC for increased level of supervision due to cognitive deficits.)     Toyin Lopez, WLALY, OTR/L  [place] License [id number]  [place] License [id number]

## 2025-02-18 NOTE — CASE MANAGEMENT
Case Management Progress Note    Patient name Verito Fallon  Location S /S -01 MRN [id number]  : 1940 Date 2025       LOS (days): 13  Geometric Mean LOS (GMLOS) (days): 3.8  Days to GMLOS:-9.2        OBJECTIVE:        Current admission status: Inpatient  Preferred Pharmacy:   [id number] [place] [facility] [address]  [address] [facility] [address]  Phone: [phone] Fax: [phone]    [facility] [place] [place] - [address]  [address] [facility] [address]  Phone: [phone] Fax: [phone]    Primary Care Provider: Robbie Warner DO    Primary Insurance: MEDICARE  Secondary Insurance: AETNA    PROGRESS NOTE:      CM received an email from [place] [employer] in regards to the optioning paperwork that was submitted to them.    CM reached out to Nevaeh Wilhelm with [facility] who confirmed that she ensured they had the needed paperwork and would follow up with them again.    CM updated the STR referral that was made in [facility] to ensure that we have an updated list of available rehab facilities once the correct paperwork is received.    Family advised CM that patient's  passed away unexpectedly this morning.  They asked about a MARCUS for patient to go to the  depending on when the services were arranged for.  Advised that they would just need to provide notice to the hospital when the services were arranged for and a 6 hour pass could be provided for her to attend.    CM department will continue to follow to assist with discharge coordination.

## 2025-02-19 LAB
ANION GAP SERPL CALCULATED.3IONS-SCNC: 7 MMOL/L (ref 4–13)
BUN SERPL-MCNC: 13 MG/DL (ref 5–25)
CALCIUM SERPL-MCNC: 9.2 MG/DL (ref 8.4–10.2)
CHLORIDE SERPL-SCNC: 102 MMOL/L (ref 96–108)
CO2 SERPL-SCNC: 31 MMOL/L (ref 21–32)
CREAT SERPL-MCNC: 0.85 MG/DL (ref 0.6–1.3)
GFR SERPL CREATININE-BSD FRML MDRD: 62 ML/MIN/1.73SQ M
GLUCOSE SERPL-MCNC: 86 MG/DL (ref 65–140)
MAGNESIUM SERPL-MCNC: 1.9 MG/DL (ref 1.9–2.7)
POTASSIUM SERPL-SCNC: 3.6 MMOL/L (ref 3.5–5.3)
SODIUM SERPL-SCNC: 140 MMOL/L (ref 135–147)

## 2025-02-19 PROCEDURE — 99232 SBSQ HOSP IP/OBS MODERATE 35: CPT | Performed by: PHYSICIAN ASSISTANT

## 2025-02-19 PROCEDURE — 97530 THERAPEUTIC ACTIVITIES: CPT

## 2025-02-19 PROCEDURE — 97116 GAIT TRAINING THERAPY: CPT

## 2025-02-19 PROCEDURE — 80048 BASIC METABOLIC PNL TOTAL CA: CPT | Performed by: PHYSICIAN ASSISTANT

## 2025-02-19 PROCEDURE — 83735 ASSAY OF MAGNESIUM: CPT | Performed by: PHYSICIAN ASSISTANT

## 2025-02-19 RX ORDER — METOPROLOL SUCCINATE 25 MG/1
25 TABLET, EXTENDED RELEASE ORAL DAILY
Status: DISCONTINUED | OUTPATIENT
Start: 2025-02-19 | End: 2025-02-21

## 2025-02-19 RX ORDER — BUMETANIDE 1 MG/1
2 TABLET ORAL DAILY
Status: DISCONTINUED | OUTPATIENT
Start: 2025-02-19 | End: 2025-02-21

## 2025-02-19 RX ADMIN — METOPROLOL SUCCINATE 25 MG: 25 TABLET, EXTENDED RELEASE ORAL at 08:43

## 2025-02-19 RX ADMIN — PRASUGREL 5 MG: 10 TABLET, FILM COATED ORAL at 08:44

## 2025-02-19 RX ADMIN — BUMETANIDE 2 MG: 1 TABLET ORAL at 08:43

## 2025-02-19 RX ADMIN — ATORVASTATIN CALCIUM 20 MG: 20 TABLET, FILM COATED ORAL at 17:26

## 2025-02-19 RX ADMIN — RIVAROXABAN 15 MG: 15 TABLET, FILM COATED ORAL at 08:43

## 2025-02-19 RX ADMIN — AMIODARONE HYDROCHLORIDE 100 MG: 200 TABLET ORAL at 08:43

## 2025-02-19 RX ADMIN — ACETAMINOPHEN 325 MG: 325 TABLET, FILM COATED ORAL at 17:26

## 2025-02-19 RX ADMIN — ESCITALOPRAM OXALATE 5 MG: 10 TABLET ORAL at 08:43

## 2025-02-19 RX ADMIN — HYDROXYZINE HYDROCHLORIDE 25 MG: 25 TABLET ORAL at 11:07

## 2025-02-19 RX ADMIN — ACETAMINOPHEN 375 MG: 325 TABLET, FILM COATED ORAL at 08:44

## 2025-02-19 NOTE — PROGRESS NOTES
Progress Note - Hospitalist   Name: Verito Fallon 84 y.o. female I MRN: [id number]  Unit/Bed#: S -01 I Date of Admission: 2/4/2025   Date of Service: 2/19/2025 I Hospital Day: 14    Assessment & Plan  Metabolic encephalopathy  Patient with history of cognitive impairment presented to the ER on 302 by family due to concern regarding suicidal statements while she was visiting her  in the nursing home.  Patient subsequently denied suicidal ideation but has now remained in the hospital for a prolonged period of time due to placement issues  Psychiatry consulted and ultimately had no further inpatient recommendations  Geriatrics consult appreciated --continue lexapro; as needed Atarax as needed for anxiety   passed away yesterday while patient was hospitalized-- continue to provide emotional support  Neuropsych consulted and currently determined patient has no capacity.      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 Coli  Course of ceftriaxone completed on 2/8   Hypokalemia  Successfully repleted    VTE Pharmacologic Prophylaxis: VTE Score: 4 xarelto    Mobility:   Basic Mobility Inpatient Raw Score: 19  JH-HLM Goal: 6: Walk 10 steps or more  JH-HLM Achieved: 7: Walk 25 feet or more  JH-HLM Goal achieved. Continue to encourage appropriate mobility.    Patient Centered Rounds: I performed bedside rounds with nursing staff today.   Discussions with Specialists or  Other Care Team Provider: case mgmt    Education and Discussions with Family / Patient: Updated  (son) via phone.    Current Length of Stay: 14 day(s)  Current Patient Status: Inpatient   Certification Statement: The patient will continue to require additional inpatient hospital stay due to pending placement  Discharge Plan: Anticipate discharge in >72 hrs to rehab facility.  Patient remains medically stable for discharge but delay due to paperwork from the state per case management    Code Status: Level 3 - DNAR and DNI    Subjective   On chart review noted documentation of low blood pressures yesterday but I was not informed of this by staff.  Patient not reporting any lightheadedness.  Denies any pain or other new concerns.  Staff notes that since receiving word of the death of her  she has been intermittently more anxious and confused and requiring reminders of his passing.      Objective :  Temp:  [97.7 °F (36.5 °C)-98.3 °F (36.8 °C)] 97.7 °F (36.5 °C)  HR:  [70-77] 71  BP: ()/(53-65) 149/64  Resp:  [16-18] 16  SpO2:  [94 %-98 %] 97 %  O2 Device: None (Room air)    Body mass index is 22.02 kg/m².     Input and Output Summary (last 24 hours):     Intake/Output Summary (Last 24 hours) at 2/19/2025 1332  Last data filed at 2/19/2025 0900  Gross per 24 hour   Intake 240 ml   Output --   Net 240 ml       Physical Exam  Vitals reviewed.   Constitutional:       General: She is not in acute distress.     Appearance: Normal appearance. She is not ill-appearing, toxic-appearing or diaphoretic.      Comments: Patient seen sitting up in bed comfortable and nontoxic-appearing   Eyes:      General: No scleral icterus.        Right eye: No discharge.         Left eye: No discharge.      Conjunctiva/sclera: Conjunctivae normal.   Cardiovascular:      Rate and Rhythm: Normal rate and regular rhythm.      Heart sounds: No murmur heard.  Pulmonary:      Effort: No respiratory distress.      Breath  sounds: No stridor. No wheezing, rhonchi or rales.   Abdominal:      General: There is no distension.      Tenderness: There is no abdominal tenderness. There is no guarding.   Musculoskeletal:      Right lower leg: No edema.      Left lower leg: No edema.   Skin:     Coloration: Skin is not jaundiced or pale.      Findings: No bruising, erythema, lesion or rash.   Neurological:      Mental Status: She is alert. Mental status is at baseline.      Comments: Awake alert interactive, forgetful.  Follows commands appropriately.           Lines/Drains:          Lab Results: I have reviewed the following results:   Results from last 7 days   Lab Units 02/17/25  0134   WBC Thousand/uL 5.03   HEMOGLOBIN g/dL 13.7   HEMATOCRIT % 41.1   PLATELETS Thousands/uL 198     Results from last 7 days   Lab Units 02/19/25  0458   SODIUM mmol/L 140   POTASSIUM mmol/L 3.6   CHLORIDE mmol/L 102   CO2 mmol/L 31   BUN mg/dL 13   CREATININE mg/dL 0.85   ANION GAP mmol/L 7   CALCIUM mg/dL 9.2   GLUCOSE RANDOM mg/dL 86                       Recent Cultures (last 7 days):           Last 24 Hours Medication List:     Current Facility-Administered Medications:     acetaminophen (TYLENOL) tablet 650 mg, Q6H PRN    acetaminophen (TYLENOL) tablet 975 mg, TID    amiodarone tablet 100 mg, Daily    atorvastatin (LIPITOR) tablet 20 mg, Daily With Dinner    bumetanide (BUMEX) tablet 2 mg, Daily    escitalopram (LEXAPRO) tablet 5 mg, Daily    hydrOXYzine HCL (ATARAX) tablet 25 mg, Q6H PRN    melatonin tablet 3 mg, HS    metoprolol succinate (TOPROL-XL) 24 hr tablet 25 mg, Daily    polyethylene glycol (MIRALAX) packet 17 g, Daily    prasugrel (EFFIENT) tablet 5 mg, Daily    rivaroxaban (XARELTO) tablet 15 mg, Daily With Breakfast    senna (SENOKOT) tablet 17.2 mg, BID    Administrative Statements   Today, Patient Was Seen By: Mary Hutson PA-C      **Please Note: This note may have been constructed using a voice recognition system.**

## 2025-02-19 NOTE — ASSESSMENT & PLAN NOTE
Rate controlled   Continue Amiodarone, Toprol XL --hold parameters to avoid hypotension  Continue Xarelto

## 2025-02-19 NOTE — ASSESSMENT & PLAN NOTE
UA indicating UTI noted on admission  Urine culture --E. Coli  Course of ceftriaxone completed on 2/8

## 2025-02-19 NOTE — ASSESSMENT & PLAN NOTE
Wt Readings from Last 3 Encounters:   02/19/25 49.4 kg (109 lb)   09/27/24 53 kg (116 lb 12.8 oz)   08/16/24 55.4 kg (122 lb 1.6 oz)   Appears euvolemic   Continue PO bumex 2mg daily --hold parameters to avoid hypotension  I/O, Daily weights

## 2025-02-19 NOTE — ASSESSMENT & PLAN NOTE
Patient with history of cognitive impairment presented to the ER on 302 by family due to concern regarding suicidal statements while she was visiting her  in the nursing home.  Patient subsequently denied suicidal ideation but has now remained in the hospital for a prolonged period of time due to placement issues  Psychiatry consulted and ultimately had no further inpatient recommendations  Geriatrics consult appreciated --continue lexapro; as needed Atarax as needed for anxiety   passed away yesterday while patient was hospitalized-- continue to provide emotional support  Neuropsych consulted and currently determined patient has no capacity.

## 2025-02-19 NOTE — PLAN OF CARE
Problem: Potential for Falls  Goal: Patient will remain free of falls  Description: INTERVENTIONS:  - Educate patient/family on patient safety including physical limitations  - Instruct patient to call for assistance with activity   - Consult OT/PT to assist with strengthening/mobility   - Keep Call bell within reach  - Keep bed low and locked with side rails adjusted as appropriate  - Keep care items and personal belongings within reach  - Initiate and maintain comfort rounds  - Make Fall Risk Sign visible to staff  - Offer Toileting every 2 Hours, in advance of need  - Initiate/Maintain bed alarm  - Obtain necessary fall risk management equipment: walker  - Apply yellow socks and bracelet for high fall risk patients  - Consider moving patient to room near nurses station  Outcome: Progressing     Problem: NEUROSENSORY - ADULT  Goal: Achieves stable or improved neurological status  Description: INTERVENTIONS  - Monitor and report changes in neurological status  - Monitor vital signs such as temperature, blood pressure, glucose, and any other labs ordered   - Initiate measures to prevent increased intracranial pressure  - Monitor for seizure activity and implement precautions if appropriate      Outcome: Progressing  Goal: Remains free of injury related to seizures activity  Description: INTERVENTIONS  - Maintain airway, patient safety  and administer oxygen as ordered  - Monitor patient for seizure activity, document and report duration and description of seizure to physician/advanced practitioner  - If seizure occurs,  ensure patient safety during seizure  - Reorient patient post seizure  - Seizure pads on all 4 side rails  - Instruct patient/family to notify RN of any seizure activity including if an aura is experienced  - Instruct patient/family to call for assistance with activity based on nursing assessment  - Administer anti-seizure medications if ordered    Outcome: Progressing  Goal: Achieves maximal  functionality and self care  Description: INTERVENTIONS  - Monitor swallowing and airway patency with patient fatigue and changes in neurological status  - Encourage and assist patient to increase activity and self care.   - Encourage visually impaired, hearing impaired and aphasic patients to use assistive/communication devices  Outcome: Progressing

## 2025-02-19 NOTE — PHYSICAL THERAPY NOTE
PHYSICAL THERAPY NOTE          Patient Name: Verito Fallon  Today's Date: 2/19/2025 02/19/25 1410   PT Last Visit   PT Visit Date 02/19/25   Note Type   Note Type Treatment   Pain Assessment   Pain Assessment Tool 0-10   Pain Score No Pain   Patient's Stated Pain Goal No pain   Hospital Pain Intervention(s) Repositioned;Ambulation/increased activity;Elevated;Rest   Multiple Pain Sites No   Restrictions/Precautions   Weight Bearing Precautions Per Order No   Other Precautions Cognitive;Chair Alarm;Bed Alarm;Fall Risk   General   Chart Reviewed Yes   Response to Previous Treatment Patient with no complaints from previous session.   Family/Caregiver Present No   Cognition   Overall Cognitive Status Impaired   Arousal/Participation Alert;Responsive;Cooperative   Attention Attends with cues to redirect   Orientation Level Oriented to person;Oriented to place;Oriented to situation;Disoriented to time   Memory Decreased short term memory;Decreased recall of recent events   Following Commands Follows one step commands without difficulty   Comments pt was pleasant and cooperative throughout [facility] tx session   Subjective   Subjective pt was agreeable to participate in PT intervention and stated no pain pre/post tx session   Bed Mobility   Additional Comments pt seated OOB in the recliner pre/post tx session   Transfers   Sit to Stand 5  Supervision   Additional items Assist x 1;Armrests;Increased time required;Verbal cues   Stand to Sit 5  Supervision   Additional items Assist x 1;Armrests;Increased time required;Verbal cues   Stand pivot Unable to assess   Toilet transfer Unable to assess   Additional Comments pt continues to complete all functional transfers with RW with /s, no LOB   Ambulation/Elevation   Gait pattern Decreased foot clearance;Short stride;Excessively slow;Decreased hip extension;Foward flexed   Gait Assistance 5   Supervision   Additional items Assist x 1;Verbal cues   Assistive Device Rolling walker   Distance 250'x1 RW   Stair Management Assistance 4  Minimal assist   Additional items Assist x 1;Verbal cues;Increased time required   Stair Management Technique One rail R;Step to pattern;Foreward;Backward   Number of Stairs 5  (pt required /s to min Ax1 for stair trial as pt also utilized R sided hand rail)   Balance   Static Sitting Good   Dynamic Sitting Fair +   Static Standing Fair   Dynamic Standing Fair -   Ambulatory Fair -  (w/ RW)   Endurance Deficit   Endurance Deficit Yes   Endurance Deficit Description pt continues to demonstrate gait degradation with increased ambulation distance   Activity Tolerance   Activity Tolerance Patient limited by fatigue   Nurse Made Aware Spoke to RN   Exercises   Quad Sets Sitting;10 reps;AROM;Bilateral  (long sit position)   Hip Abduction Sitting;15 reps;AROM;Bilateral   Hip Adduction Sitting;15 reps;AROM;Bilateral  (pillow squeezes)   Knee AROM Long Arc Quad Sitting;15 reps;AROM;Bilateral   Ankle Pumps Sitting;20 reps;AROM;Bilateral  (long sit position)   Marching Sitting;15 reps;AROM;Bilateral   Assessment   Prognosis Fair   Problem List Decreased strength;Impaired balance;Decreased endurance;Decreased mobility;Decreased cognition;Impaired judgement;Decreased safety awareness;Decreased coordination;Decreased skin integrity   Assessment pt began tx session seated OOB in the recliner as pt was agreeable to participate in PT intervention. PT to focus on transfer training, TE activities, posture/balance with gait and stair trials. In comparison to previous tx sessions pt continues to perform all functional transfers with RW with /s, no LOB. pt completed 5 STS transfers in todays tx session in order to increase safety and balance with transfers. pt was able to increase her activity tolerance and ambulation distance as pt ambulated 250'x1 RW with /s, no LOB. pt participated in TE  activities while seated in the recliner with AROM, no increases in pain in order to strengthen pt LE's in efforts in reducing risk for falls and injuries with all OOB activities. pt was educated with verbal/visual demonstration on all safe stair trial strategies prior to initiating steps. pt completed a total of 5 steps with R sided hand rail. initial 4 steps pt completed with /s but last step required min ax1 due to pt fatigue and generalized weakness. At this time pt is not functioning at her baselin line and would benefit from continued skilled PT intervention in order to increase her activity tolerance, ambulation distance and steps completed w/o requiring any physical assistance. Post tx pt in the recliner with call bell, chair alarm activated and all pt needs met. Continue to recommend Dc w/ level 2 moderate rehab resource intensity when medically cleared   Barriers to Discharge Inaccessible home environment;Decreased caregiver support   Barriers to Discharge Comments pt lives at home alone and has 3 [address] home   Goals   Patient Goals to see her    STG Expiration Date (S)  02/15/25  (Spoke to PT Sravanthi in reference to STG expiration date)   PT Treatment Day 3   Plan   Treatment/Interventions Functional transfer training;LE strengthening/ROM;Elevations;Therapeutic exercise;Endurance training;Patient/family training;Equipment eval/education;Gait training;Bed mobility;Cognitive reorientation   Progress Progressing toward goals   PT Frequency 3-5x/wk   Discharge Recommendation   Rehab Resource Intensity Level, PT II (Moderate Resource Intensity)   Additional Comments pt continues to complete all functional transfesr with RW with /s, no LOB. pt increased activity toleranec and ambulation distance as pt ambulated 250'x1 RW w/o LOB   AM-PAC Basic Mobility Inpatient   Turning in Flat Bed Without Bedrails 4   Lying on Back to Sitting on Edge of Flat Bed Without Bedrails 4   Moving Bed to Chair 3   Standing Up From  Chair Using Arms 3   Walk in Room 3   Climb 3-5 Stairs With Railing 3   Basic Mobility Inpatient Raw Score 20   Basic Mobility Standardized Score 43.99   [facility] Highest Level Of Mobility   -HLM Goal 6: Walk 10 steps or more   -HLM Achieved 8: Walk 250 feet ot more   Education   Education Provided Mobility training;Assistive device;Other  (TE activities and stair trial)   Patient Demonstrates acceptance/verbal understanding   End of Consult   Patient Position at End of Consult Bedside chair;Bed/Chair alarm activated;All needs within reach   The patient's AM-PAC Basic Mobility Inpatient Short Form Raw Score is 20. A Raw score of greater than 16 suggests the patient may benefit from discharge to home. Please also refer to the recommendation of the Physical Therapist for safe discharge planning.

## 2025-02-19 NOTE — PLAN OF CARE
Problem: PHYSICAL THERAPY ADULT  Goal: Performs mobility at highest level of function for planned discharge setting.  See evaluation for individualized goals.  Description: Treatment/Interventions: ADL retraining, Functional transfer training, LE strengthening/ROM, Elevations, Therapeutic exercise, Endurance training, Cognitive reorientation, Patient/family training, Equipment eval/education, Bed mobility, Gait training, Compensatory technique education, Spoke to nursing, Spoke to case management, OT          See flowsheet documentation for full assessment, interventions and recommendations.  Outcome: Progressing  Note: Prognosis: Fair  Problem List: Decreased strength, Impaired balance, Decreased endurance, Decreased mobility, Decreased cognition, Impaired judgement, Decreased safety awareness, Decreased coordination, Decreased skin integrity  Assessment: pt began tx session seated OOB in the recliner as pt was agreeable to participate in PT intervention. PT to focus on transfer training, TE activities, posture/balance with gait and stair trials. In comparison to previous tx sessions pt continues to perform all functional transfers with RW with /s, no LOB. pt completed 5 STS transfers in todays tx session in order to increase safety and balance with transfers. pt was able to increase her activity tolerance and ambulation distance as pt ambulated 250'x1 RW with /s, no LOB. pt participated in TE activities while seated in the recliner with AROM, no increases in pain in order to strengthen pt LE's in efforts in reducing risk for falls and injuries with all OOB activities. pt was educated with verbal/visual demonstration on all safe stair trial strategies prior to initiating steps. pt completed a total of 5 steps with R sided hand rail. initial 4 steps pt completed with /s but last step required min ax1 due to pt fatigue and generalized weakness. At this time pt is not functioning at her baselin line and would  benefit from continued skilled PT intervention in order to increase her activity tolerance, ambulation distance and steps completed w/o requiring any physical assistance. Post tx pt in the recliner with call bell, chair alarm activated and all pt needs met. Continue to recommend Dc w/ level 2 moderate rehab resource intensity when medically cleared  Barriers to Discharge: Inaccessible home environment, Decreased caregiver support  Barriers to Discharge Comments: pt lives at home alone and has 3 [place] home  Rehab Resource Intensity Level, PT: II (Moderate Resource Intensity)    See flowsheet documentation for full assessment.

## 2025-02-20 PROBLEM — N39.0 UTI (URINARY TRACT INFECTION): Status: RESOLVED | Noted: 2021-03-05 | Resolved: 2025-02-20

## 2025-02-20 PROBLEM — E87.6 HYPOKALEMIA: Status: RESOLVED | Noted: 2023-12-29 | Resolved: 2025-02-20

## 2025-02-20 PROCEDURE — 99232 SBSQ HOSP IP/OBS MODERATE 35: CPT | Performed by: PHYSICIAN ASSISTANT

## 2025-02-20 RX ADMIN — ESCITALOPRAM OXALATE 5 MG: 10 TABLET ORAL at 09:55

## 2025-02-20 RX ADMIN — AMIODARONE HYDROCHLORIDE 100 MG: 200 TABLET ORAL at 09:55

## 2025-02-20 RX ADMIN — ATORVASTATIN CALCIUM 20 MG: 20 TABLET, FILM COATED ORAL at 16:21

## 2025-02-20 RX ADMIN — METOPROLOL SUCCINATE 25 MG: 25 TABLET, EXTENDED RELEASE ORAL at 09:55

## 2025-02-20 RX ADMIN — BUMETANIDE 2 MG: 1 TABLET ORAL at 09:54

## 2025-02-20 RX ADMIN — PRASUGREL 5 MG: 10 TABLET, FILM COATED ORAL at 09:59

## 2025-02-20 RX ADMIN — SENNOSIDES 17.2 MG: 8.6 TABLET ORAL at 10:00

## 2025-02-20 RX ADMIN — ACETAMINOPHEN 325 MG: 325 TABLET, FILM COATED ORAL at 16:21

## 2025-02-20 RX ADMIN — RIVAROXABAN 15 MG: 15 TABLET, FILM COATED ORAL at 10:02

## 2025-02-20 RX ADMIN — SENNOSIDES 17.2 MG: 8.6 TABLET ORAL at 17:30

## 2025-02-20 RX ADMIN — ACETAMINOPHEN 325 MG: 325 TABLET, FILM COATED ORAL at 09:53

## 2025-02-20 NOTE — ASSESSMENT & PLAN NOTE
Wt Readings from Last 3 Encounters:   02/20/25 48.9 kg (107 lb 12.9 oz)   09/27/24 53 kg (116 lb 12.8 oz)   08/16/24 55.4 kg (122 lb 1.6 oz)   Appears euvolemic   Continue PO bumex 2mg daily --hold parameters to avoid hypotension (BP this AM low when initially got up from bed - then repeat and BP improved)  Slow positional changes  I/O, Daily weights

## 2025-02-20 NOTE — CASE MANAGEMENT
Case Management Discharge Planning Note    Patient name Verito Fallon  Location S /S -01 MRN [id number]  : 1940 Date 2025       Current Admission Date: 2025  Current Admission Diagnosis:Metabolic encephalopathy   Patient Active Problem List    Diagnosis Date Noted Date Diagnosed    CKD (chronic kidney disease) 2025     Constipation 2025     Acute on chronic combined systolic and diastolic congestive heart failure (HCC) 2024     Chronic kidney disease, stage 3b (HCC) 2024     Osteoporosis 2024     Presence of permanent cardiac pacemaker 2024     Hypokalemia 2023     History of seizure 12/15/2023     At risk for constipation 12/15/2023     At risk for delirium 12/15/2023     Advance care planning 12/15/2023     Hyperkalemia 12/15/2023     Leg hematoma, right, subsequent encounter 2023     Pruritic rash 2023     Cervical spondylosis      Cervical disc disorder with radiculopathy of mid-cervical region      Atherosclerosis with claudication of extremity ([facility]) 2021     Carotid stenosis, asymptomatic, bilateral 2021     Dementia ([facility]) 2021     Current use of long term anticoagulation 2021     Long term current use of amiodarone 2021     Renal artery stenosis (HCC) 2021     Pulmonary hypertension ([facility]) 2021     Sick sinus syndrome ([facility]) 2021     Hx of CABG 2021     CAD (coronary artery disease) 2021     Depression 2021     UTI (urinary tract infection) 2021     Hyperlipidemia 2021     Metabolic encephalopathy 2021     A-fib ([facility]) 2021     PAD (peripheral artery disease) ([facility]) 2021     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[facility]) 2021     Hypertensive heart and 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 ([facility]) 2017       LOS (days): 15  Geometric Mean LOS (GMLOS) (days): 3.8  Days to GMLOS:-11.2      OBJECTIVE:  Risk of Unplanned Readmission Score: 15.28         Current admission status: Inpatient   Preferred Pharmacy:   [id number] [place], [place] - [address]  [address] [address]  Phone: [phone] Fax: [phone]    [facility] - [place], [place] - [address]  [address] [address]  Phone: [phone] Fax: [phone]    Primary Care Provider: Robbie Warner DO    Primary Insurance: MEDICARE  Secondary Insurance: AETNA    DISCHARGE DETAILS:                         CM reached out to patient's daughter again to discuss her STR facility options however CM was only able to leave a voicemail requesting a call back.    BRIANA then also tried patient's son Duane.  BRIANA was able to speak with him however he reported that he had returned to [place] and was not coming back to [place] until at least tomorrow.  He said he could not make a decision without speaking with his sister and that they have been busing making  arrangement for patient's spouse.    BRIANA shared with him the importance of making a decision as patient is medically stable for DC and with accepting facilities her insurance may no longer pay for her hospital stay.    BRIANA will wait to hear back from daughter of follow up with family tomorrow again.

## 2025-02-20 NOTE — ASSESSMENT & PLAN NOTE
Patient with history of cognitive impairment presented to the ER on 302 petition by family due to concern regarding suicidal statements while she was visiting her  in the nursing home.    Patient subsequently denied suicidal ideation but has now remained in the hospital for a prolonged period of time due to placement issues  Psychiatry consulted and ultimately had no further inpatient recommendations  Geriatrics consult appreciated - continue lexapro; as needed Atarax as needed for anxiety   passed away 2/18 while patient was hospitalized (he was at [facility])- continue to provide emotional support  Neuropsych consulted and currently determined patient has no capacity.    No focal deficits on exam to suggest need for imaging  Family informed they need to make decision on facility by 2/21 as patient is medically stable

## 2025-02-20 NOTE — CASE MANAGEMENT
Case Management Discharge Planning Note    Patient name Verito Fallon  Location S /S -01 MRN [id number]  : 1940 Date 2025       Current Admission Date: 2025  Current Admission Diagnosis:Metabolic encephalopathy   Patient Active Problem List    Diagnosis Date Noted Date Diagnosed    CKD (chronic kidney disease) 2025     Constipation 2025     Acute on chronic combined systolic and diastolic congestive heart failure (HCC) 2024     Chronic kidney disease, stage 3b (HCC) 2024     Osteoporosis 2024     Presence of permanent cardiac pacemaker 2024     Hypokalemia 2023     History of seizure 12/15/2023     At risk for constipation 12/15/2023     At risk for delirium 12/15/2023     Advance care planning 12/15/2023     Hyperkalemia 12/15/2023     Leg hematoma, right, subsequent encounter 2023     Pruritic rash 2023     Cervical spondylosis      Cervical disc disorder with radiculopathy of mid-cervical region      Atherosclerosis with claudication of extremity ([facility]) 2021     Carotid stenosis, asymptomatic, bilateral 2021     Dementia ([facility]) 2021     Current use of long term anticoagulation 2021     Long term current use of amiodarone 2021     Renal artery stenosis (HCC) 2021     Pulmonary hypertension ([facility]) 2021     Sick sinus syndrome ([facility]) 2021     Hx of CABG 2021     CAD (coronary artery disease) 2021     Depression 2021     UTI (urinary tract infection) 2021     Hyperlipidemia 2021     Metabolic encephalopathy 2021     A-fib ([facility]) 2021     PAD (peripheral artery disease) ([facility]) 2021     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[facility]) 2021     Hypertensive 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       LOS (days): 15  Geometric Mean LOS (GMLOS) (days): 3.8  Days to GMLOS:-11.3      OBJECTIVE:  Risk of Unplanned Readmission Score: 15.28         Current admission status: Inpatient   Preferred Pharmacy:   [id number] [place], [place] - [address]  [address] [address]  Phone: [phone] Fax: [phone]    [facility] - [place] - [address]  [address] [address]  Phone: [phone] Fax: [phone]    Primary Care Provider: Robbie Warner DO    Primary Insurance: MEDICARE  Secondary Insurance: AETNA    DISCHARGE DETAILS:    Discharge planning discussed with:: Melyssa San (Daughter)  Freedom of Choice: Yes  Comments - Freedom of Choice: Reviewed available STR facilites at bedside  CM contacted family/caregiver?: Yes  Were Treatment Team discharge recommendations reviewed with patient/caregiver?: Yes  Did patient/caregiver verbalize understanding of patient care needs?: N/A- going to facility  Were patient/caregiver advised of the risks associated with not following Treatment Team discharge recommendations?: Yes    Contacts  Patient Contacts: Melyssa San (Daughter)  Relationship to Patient:: Family  Contact Method: In Person  Reason/Outcome: Discharge Planning, Continuity of Care, Emergency Contact    Requested Home Health Care         Is the patient interested in HHC at discharge?: No    DME Referral Provided  Referral made for DME?: No    Other Referral/Resources/Interventions Provided:  Interventions: Short Term Rehab         Treatment Team Recommendation: Short Term Rehab  Discharge Destination Plan:: Short Term Rehab  Transport at Discharge : Wheelchair van                             IMM Given (Date):: 02/20/25  IMM Given to:: Family  Family notified:: Reviewed with daughter at bedside     IMM reviewed with patient and daughter.  They agree with discharge determination.     CM met with patient and daughter at bedside.  CM discussed with patient's daughter that we received the paperwork for the state and  county that we have been waiting on and that patient is medically stable for DC.  Furthermore that they need to select a STR for her of from the available 3 choices.  BRIANA also shared with her that patient will likely not be in STR for very long as she has improved significantly and has minimal skilled needs requiring STR at this time.  Daughter asked about [facility] as patient has talked about living there.  CM advised that at this point in time the bed offers are the only ones we are going to get.  If she wants to reach out to [facility] to see if custodial is an option with them she would need to do that on her own.    CM also provided daughter with [facility] placement brochure that she could use to assist with getting information on available custodial facilities.    Daughter took the STR list and has CM contact information.  She will review the facility options with her brother and let CM know their decision.  CM asked that they provide their choice tomorrow by noon.    CM department will continue to follow to assist with discharge coordination.

## 2025-02-20 NOTE — PROGRESS NOTES
Progress Note - Hospitalist   Name: Verito Fallon 84 y.o. female I MRN: [id number]  Unit/Bed#: S -01 I Date of Admission: 2/4/2025   Date of Service: 2/20/2025 I Hospital Day: 15    Assessment & Plan  Metabolic encephalopathy  Patient with history of cognitive impairment presented to the ER on 302 petition by family due to concern regarding suicidal statements while she was visiting her  in the nursing home.    Patient subsequently denied suicidal ideation but has now remained in the hospital for a prolonged period of time due to placement issues  Psychiatry consulted and ultimately had no further inpatient recommendations  Geriatrics consult appreciated - continue lexapro; as needed Atarax as needed for anxiety   passed away 2/18 while patient was hospitalized (he was at [facility])- continue to provide emotional support  Neuropsych consulted and currently determined patient has no capacity.    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)  Wt Readings from Last 3 Encounters:   02/20/25 48.9 kg (107 lb 12.9 oz)   09/27/24 53 kg (116 lb 12.8 oz)   08/16/24 55.4 kg (122 lb 1.6 oz)   Appears euvolemic   Continue PO bumex 2mg daily --hold parameters to avoid hypotension (BP this AM low when initially got up from bed - then repeat and BP improved)  Slow positional changes  I/O, Daily weights  A-fib (HCC)  Rate controlled   Continue Amiodarone, Toprol XL --hold parameters to avoid hypotension  Continue Xarelto  CAD (coronary artery disease)  S/p CABG   Continue Prasugrel, BB, statin   UTI (urinary tract infection) (Resolved: 2/20/2025)  UA indicating UTI noted on admission  Urine culture --E. Coli  Course of ceftriaxone completed on 2/8   Hypokalemia (Resolved: 2/20/2025)  Successfully repleted    VTE Pharmacologic Prophylaxis: VTE  Score: 4 Moderate Risk (Score 3-4) - Pharmacological DVT Prophylaxis Ordered: rivaroxaban (Xarelto).    Mobility:   Basic Mobility Inpatient Raw Score: 20  JH-HLM Goal: 6: Walk 10 steps or more  JH-HLM Achieved: 7: Walk 25 feet or more  JH-HLM Goal achieved. Continue to encourage appropriate mobility.    Patient Centered Rounds: I performed bedside rounds with nursing staff today.   Discussions with Specialists or Other Care Team Provider: nursing    Education and Discussions with Family / Patient: Updated  (daughter) via phone.    Current Length of Stay: 15 day(s)  Current Patient Status: Inpatient   Certification Statement: The patient will continue to require additional inpatient hospital stay due to awaiting safe d/c placement  Discharge Plan: Anticipate discharge tomorrow to rehab facility.    Code Status: Level 3 - DNAR and DNI    Subjective   No nausea or vomiting  No chest pain or SOB  No fever or chills  Asking if her      Objective :  Temp:  [97.8 °F (36.6 °C)-98.1 °F (36.7 °C)] 97.8 °F (36.6 °C)  HR:  [70-85] 70  BP: ()/(53-64) 146/64  Resp:  [16] 16  SpO2:  [94 %-97 %] 94 %    Body mass index is 21.77 kg/m².     Input and Output Summary (last 24 hours):   No intake or output data in the 24 hours ending 25 1255    Physical Exam  Vitals and nursing note reviewed.   Constitutional:       General: She is not in acute distress.     Appearance: Normal appearance. She is not ill-appearing or diaphoretic.   HENT:      Head: Normocephalic and atraumatic.      Mouth/Throat:      Mouth: Mucous membranes are dry.   Cardiovascular:      Rate and Rhythm: Normal rate and regular rhythm.   Pulmonary:      Effort: Pulmonary effort is normal.      Breath sounds: Normal breath sounds. No wheezing or rales.   Abdominal:      General: Bowel sounds are normal.      Palpations: Abdomen is soft.      Tenderness: There is no abdominal tenderness. There is no guarding.   Musculoskeletal:       Right lower leg: No edema.      Left lower leg: No edema.   Skin:     General: Skin is warm and dry.   Neurological:      Mental Status: She is alert.      Comments: Oriented to person/place. Follows commands. Pleasantly confused.   Psychiatric:      Comments: anxious           Lines/Drains:      Lab Results: I have reviewed the following results:   Results from last 7 days   Lab Units 02/17/25  0134   WBC Thousand/uL 5.03   HEMOGLOBIN g/dL 13.7   HEMATOCRIT % 41.1   PLATELETS Thousands/uL 198     Results from last 7 days   Lab Units 02/19/25  0458   SODIUM mmol/L 140   POTASSIUM mmol/L 3.6   CHLORIDE mmol/L 102   CO2 mmol/L 31   BUN mg/dL 13   CREATININE mg/dL 0.85   ANION GAP mmol/L 7   CALCIUM mg/dL 9.2   GLUCOSE RANDOM mg/dL 86                       Recent Cultures (last 7 days):         Imaging Results Review: No pertinent imaging studies reviewed.  Other Study Results Review: No additional pertinent studies reviewed.    Last 24 Hours Medication List:     Current Facility-Administered Medications:     acetaminophen (TYLENOL) tablet 650 mg, Q6H PRN    acetaminophen (TYLENOL) tablet 975 mg, TID    amiodarone tablet 100 mg, Daily    atorvastatin (LIPITOR) tablet 20 mg, Daily With Dinner    bumetanide (BUMEX) tablet 2 mg, Daily    escitalopram (LEXAPRO) tablet 5 mg, Daily    hydrOXYzine HCL (ATARAX) tablet 25 mg, Q6H PRN    melatonin tablet 3 mg, HS    metoprolol succinate (TOPROL-XL) 24 hr tablet 25 mg, Daily    polyethylene glycol (MIRALAX) packet 17 g, Daily    prasugrel (EFFIENT) tablet 5 mg, Daily    rivaroxaban (XARELTO) tablet 15 mg, Daily With Breakfast    senna (SENOKOT) tablet 17.2 mg, BID    Administrative Statements   Today, Patient Was Seen By: Kasey Shirley PA-C      **Please Note: This note may have been constructed using a voice recognition system.**

## 2025-02-20 NOTE — CASE MANAGEMENT
Case Management Progress Note    Patient name Verito Fallon  Location S /S -01 MRN [id number]  : 1940 Date 2025       LOS (days): 15  Geometric Mean LOS (GMLOS) (days): 3.8  Days to GMLOS:-11        OBJECTIVE:        Current admission status: Inpatient  Preferred Pharmacy:   [id number] [place] [place] [facility] [address]  [address] [address]  Phone: [phone] Fax: [phone]    [facility] [place], [place] - [address]  [address] [address]  Phone: [phone] Fax: [phone]    Primary Care Provider: Robbie Warner DO    Primary Insurance: MEDICARE  Secondary Insurance: AETNA    PROGRESS NOTE:      CM followed up with an email to Nevaeh Wilhelm the [place] [employer] Supervisor to see if there is any update in getting back the [place] Dept of Human Services letter that we are waiting on for STR placement for the patient.    Patient's family advised CM and hospital staff on Tuesday that patient's  had unexpectedly passed away.  They are working on  arrangements for him.    CM was advised that in the event that patient is not placed in STR before  that his services were arranged for  and at that time they would be requesting a Patient Leave of Absence for her to attend his services.    CM department will continue to follow to assist with discharge coordination.

## 2025-02-21 VITALS
WEIGHT: 107.4 LBS | TEMPERATURE: 98.1 F | OXYGEN SATURATION: 95 % | HEART RATE: 70 BPM | RESPIRATION RATE: 18 BRPM | SYSTOLIC BLOOD PRESSURE: 99 MMHG | DIASTOLIC BLOOD PRESSURE: 56 MMHG | BODY MASS INDEX: 21.69 KG/M2

## 2025-02-21 PROBLEM — I95.9 HYPOTENSION: Status: ACTIVE | Noted: 2025-02-21

## 2025-02-21 PROCEDURE — NC001 PR NO CHARGE: Performed by: PHYSICIAN ASSISTANT

## 2025-02-21 PROCEDURE — 97116 GAIT TRAINING THERAPY: CPT

## 2025-02-21 PROCEDURE — 99239 HOSP IP/OBS DSCHRG MGMT >30: CPT | Performed by: PHYSICIAN ASSISTANT

## 2025-02-21 PROCEDURE — 97530 THERAPEUTIC ACTIVITIES: CPT

## 2025-02-21 RX ORDER — ESCITALOPRAM OXALATE 5 MG/1
5 TABLET ORAL DAILY
Qty: 30 TABLET | Refills: 0 | Status: SHIPPED | OUTPATIENT
Start: 2025-02-22 | End: 2025-03-24

## 2025-02-21 RX ORDER — METOPROLOL SUCCINATE 25 MG/1
12.5 TABLET, EXTENDED RELEASE ORAL DAILY
Status: DISCONTINUED | OUTPATIENT
Start: 2025-02-21 | End: 2025-02-21 | Stop reason: HOSPADM

## 2025-02-21 RX ORDER — BUMETANIDE 0.5 MG/1
0.5 TABLET ORAL DAILY PRN
Qty: 30 TABLET | Refills: 0 | Status: SHIPPED | OUTPATIENT
Start: 2025-02-21

## 2025-02-21 RX ORDER — METOPROLOL SUCCINATE 25 MG/1
12.5 TABLET, EXTENDED RELEASE ORAL DAILY
Qty: 15 TABLET | Refills: 0 | Status: SHIPPED | OUTPATIENT
Start: 2025-02-22 | End: 2025-03-24

## 2025-02-21 RX ORDER — BUMETANIDE 1 MG/1
0.5 TABLET ORAL DAILY
Status: DISCONTINUED | OUTPATIENT
Start: 2025-02-21 | End: 2025-02-21 | Stop reason: HOSPADM

## 2025-02-21 RX ADMIN — SENNOSIDES 17.2 MG: 8.6 TABLET ORAL at 09:23

## 2025-02-21 RX ADMIN — HYDROXYZINE HYDROCHLORIDE 25 MG: 25 TABLET ORAL at 02:56

## 2025-02-21 RX ADMIN — HYDROXYZINE HYDROCHLORIDE 25 MG: 25 TABLET ORAL at 17:44

## 2025-02-21 RX ADMIN — ATORVASTATIN CALCIUM 20 MG: 20 TABLET, FILM COATED ORAL at 16:23

## 2025-02-21 RX ADMIN — AMIODARONE HYDROCHLORIDE 100 MG: 200 TABLET ORAL at 09:23

## 2025-02-21 RX ADMIN — POLYETHYLENE GLYCOL 3350 17 G: 17 POWDER, FOR SOLUTION ORAL at 09:23

## 2025-02-21 RX ADMIN — RIVAROXABAN 15 MG: 15 TABLET, FILM COATED ORAL at 09:23

## 2025-02-21 RX ADMIN — ESCITALOPRAM OXALATE 5 MG: 10 TABLET ORAL at 09:23

## 2025-02-21 RX ADMIN — PRASUGREL 5 MG: 10 TABLET, FILM COATED ORAL at 09:26

## 2025-02-21 NOTE — CASE MANAGEMENT
Case Management Discharge Planning Note    Patient name Verito Fallon  Location S /S -01 MRN [id number]  : 1940 Date 2025       Current Admission Date: 2025  Current Admission Diagnosis:Metabolic encephalopathy   Patient Active Problem List    Diagnosis Date Noted Date Diagnosed    Hypotension 2025     CKD (chronic kidney disease) 2025     Constipation 2025     Acute on chronic combined systolic and diastolic congestive heart failure (HCC) 2024     Chronic kidney disease, stage 3b (HCC) 2024     Osteoporosis 2024     Presence of permanent cardiac pacemaker 2024     History of seizure 12/15/2023     At risk for constipation 12/15/2023     At risk for delirium 12/15/2023     Advance care planning 12/15/2023     Hyperkalemia 12/15/2023     Leg hematoma, right, subsequent encounter 2023     Pruritic rash 2023     Cervical spondylosis      Cervical disc disorder with radiculopathy of mid-cervical region      Atherosclerosis with claudication of extremity ([facility]) 2021     Carotid stenosis, asymptomatic, bilateral 2021     Dementia ([facility]) 2021     Current use of long term anticoagulation 2021     Long term current use of amiodarone 2021     Renal artery stenosis ([facility]) 2021     Pulmonary hypertension ([facility]) 2021     Sick sinus syndrome ([facility]) 2021     Hx of CABG 2021     CAD (coronary artery disease) 2021     Depression 2021     Hyperlipidemia 2021     Metabolic encephalopathy 2021     A-fib ([facility]) 2021     PAD (peripheral artery disease) ([facility]) 2021     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[facility]) 2021     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 [address]  Phone: [phone] Fax: [phone]    [facility] - [place], [place] - [address]  [address] [address]  Phone: [phone] Fax: [phone]    Primary Care Provider: Robbie Warner DO    Primary Insurance: MEDICARE  Secondary Insurance: AETNA    DISCHARGE DETAILS:    Discharge planning discussed with:: Daughter Melyssa, granddaughter and patient  Freedom of Choice: Yes  Comments - Freedom of Choice: Discussed DCP options  CM contacted family/caregiver?: Yes  Were Treatment Team discharge recommendations reviewed with patient/caregiver?: Yes  Did patient/caregiver verbalize understanding of patient care needs?: N/A- going to facility  Were patient/caregiver advised of the risks associated with not following Treatment Team discharge recommendations?: Yes    Contacts  Patient Contacts: Melyssa San (Daughter)  Relationship to Patient:: Family  Contact Method: In Person  Reason/Outcome: Discharge Planning, Continuity of Care, Emergency Contact    Requested Home Health Care         Is the patient interested in HHC at discharge?: No    DME Referral Provided  Referral made for DME?: No    Other Referral/Resources/Interventions Provided:  Interventions: Short Term Rehab, Transportation         Treatment Team Recommendation: Short Term Rehab  Discharge Destination Plan:: Short Term Rehab  Transport at Discharge : Family                                           Accepting Facility Name, City & State : [facility]  Receiving Facility/Agency Phone Number: [phone]  Facility/Agency Fax Number: [phone]     CM met at patient's bedside with SLIM, primary nurse, patient, daughter and granddaughter.  Team discussed possible options for placement other than the available STR facilities if they did  not like them.  Home with VNA, and day program, home with family, GRANT, and respite.    Daughter reported that she had visited 2 of the facilities and had not heard back from the 3rd about going to tour.  CM offered to call and see if she would be able to go tour now.  CM spoke with Lisa Mcclain who confirmed family could go there and tour.  Daughter left immediately to go do this.    CM discussed with granddaughter additionally that this is only temporary that while she is in rehab that they can work on a more permanent option for her that works for everyone.    About 45 minutes later CM was notified by granddaughter that they are agreeable with [facility].    CM reserved with them in [place] and spoke with Lisa to confirm patient is still able to admit tonight.    [facility] was contacted to complete DC summary and CM discussed transport with family.  Family vs [facility] transport.  Daughter is requesting [facility] transport be arranged.  This was requested via [employer] for 6:15 PM.    Discussed with Family there may be an out of pocket expense for transport.     SLIM, primary nurse, patient, family and facility aware of the DCP.     No further CM DC needs were discussed or identified at this time.

## 2025-02-21 NOTE — PLAN OF CARE
Problem: PHYSICAL THERAPY ADULT  Goal: Performs mobility at highest level of function for planned discharge setting.  See evaluation for individualized goals.  Description: Treatment/Interventions: ADL retraining, Functional transfer training, LE strengthening/ROM, Elevations, Therapeutic exercise, Endurance training, Cognitive reorientation, Patient/family training, Equipment eval/education, Bed mobility, Gait training, Compensatory technique education, Spoke to nursing, Spoke to case management, OT          See flowsheet documentation for full assessment, interventions and recommendations.  Note: Prognosis: Fair  Problem List: Decreased strength, Decreased endurance, Impaired balance, Decreased mobility, Decreased coordination, Decreased cognition, Impaired judgement, Decreased safety awareness  Assessment: Pt with limited tolerance to PT session today 2/2 to low endurance, easily fatigued, impaired cognition and SOB. Pt cont with overall decrease in safety with functional mobility and gait both with and without a RW. Pt cont to functional below baseline level of mobility - pt normally amb w/out an AD PTA but will benefit from cont use of a RW at this time. Pt also unable to amb up/down steps today 2/2 to feeling tired and weak. Pt remains appropriate for Level II Moderate Resource Intensity when medically stable for dc. Pt is not safe to be dc home - pt lives alone, cont to need varying assist, has impaired cognition and a decrease in safety. If pt were to go home, pt is at marked risk for falls and subsesquent readmission. Pt will cont to benefit from skilled PT services per PT POC while admitted. PT goals updated this session  Barriers to Discharge: Inaccessible home environment, Decreased caregiver support  Barriers to Discharge Comments: Pt lives alone;marked cognitive impairment;3 LUDWIN;pt functioning below baseline level of mobility  Rehab Resource Intensity Level, PT: II (Moderate Resource  Intensity)    See flowsheet documentation for full assessment.

## 2025-02-21 NOTE — PROGRESS NOTES
Progress Note - Hospitalist   Name: Verito Fallon 84 y.o. female I MRN: [id number]  Unit/Bed#: S -01 I Date of Admission: 2/4/2025   Date of Service: 2/21/2025 I Hospital Day: 16    Assessment & Plan  Metabolic encephalopathy  Patient with history of cognitive impairment presented to the ER on 302 petition by family due to concern regarding suicidal statements while she was visiting her  in the nursing home.    Patient subsequently denied suicidal ideation but has now remained in the hospital for a prolonged period of time due to placement issues  Psychiatry consulted and ultimately had no further inpatient recommendations  Geriatrics consult appreciated - continue lexapro; as needed Atarax as needed for anxiety   passed away 2/18 while patient was hospitalized (he was at [facility])- continue to provide emotional support.  Patient typically asks every single day whether her  has passed as she does not recall  Neuropsych consulted and currently determined patient has no capacity.      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)  Wt Readings from Last 3 Encounters:   02/21/25 48.7 kg (107 lb 6.4 oz)   09/27/24 53 kg (116 lb 12.8 oz)   08/16/24 55.4 kg (122 lb 1.6 oz)   Blood pressure mildly low and weight down, but otherwise asymptomatic--would suggest holding Bumex for today and decreasing dose for discharge  I/O, Daily weights  A-fib (HCC)  Rate controlled   Continue Amiodarone  Continue Toprol XL--decrease dose to prevent hypotension  Continue Xarelto  CAD (coronary artery disease)  S/p CABG   Continue Prasugrel, BB, statin     VTE Pharmacologic Prophylaxis: VTE Score: 4 xarelto    Mobility:   Basic Mobility Inpatient Raw Score: 23  JH-HLM Goal: 7: Walk 25 feet or more  JH-HLM Achieved: 7: Walk 25 feet or more  JH-HLM Goal achieved. Continue to encourage appropriate mobility.    Patient Centered Rounds: I spoke with nursing staff today.   Discussions with  "Specialists or Other Care Team Provider: Case management    Education and Discussions with Family / Patient: will await input from case management before I contact family    Current Length of Stay: 16 day(s)  Current Patient Status: Inpatient   Certification Statement: The patient will continue to require additional inpatient hospital stay due to pending discharge  Discharge Plan: Medically stable for discharge    Code Status: Level 3 - DNAR and DNI    Subjective   \" I just do not understand why I am still here.\" \" Did my  pass away?\"  Patient says she is eating and drinking fine and does not feel dehydrated.  No lightheadedness.  No pain or other complaints    Objective :  Temp:  [97.6 °F (36.4 °C)-98.4 °F (36.9 °C)] 98.4 °F (36.9 °C)  HR:  [71-75] 71  BP: ()/(52-70) 86/52  Resp:  [16-18] 18  SpO2:  [94 %-98 %] 95 %  O2 Device: None (Room air)    Body mass index is 21.69 kg/m².     Input and Output Summary (last 24 hours):     Intake/Output Summary (Last 24 hours) at 2/21/2025 0847  Last data filed at 2/20/2025 2152  Gross per 24 hour   Intake 240 ml   Output 400 ml   Net -160 ml       Physical Exam  Vitals reviewed.   Constitutional:       General: She is not in acute distress.     Appearance: Normal appearance. She is not ill-appearing, toxic-appearing or diaphoretic.      Comments: Patient seen sitting up comfortably in bedside chair eating breakfast in no apparent distress   HENT:      Mouth/Throat:      Mouth: Mucous membranes are dry.      Comments: Dry skin with tenting  Eyes:      General: No scleral icterus.        Right eye: No discharge.         Left eye: No discharge.   Cardiovascular:      Rate and Rhythm: Normal rate and regular rhythm.      Heart sounds: No murmur heard.  Pulmonary:      Effort: No respiratory distress.      Breath sounds: No stridor. No wheezing or rhonchi.   Abdominal:      General: There is no distension.      Palpations: Abdomen is soft.      Tenderness: There is no " abdominal tenderness. There is no guarding.   Musculoskeletal:      Right lower leg: No edema.      Left lower leg: No edema.   Skin:     General: Skin is warm and dry.      Coloration: Skin is not jaundiced or pale.      Findings: No erythema, lesion or rash.   Neurological:      Mental Status: She is alert. Mental status is at baseline.      Comments: Awake alert interactive no confusion   Psychiatric:      Comments: Overall very pleasant mood until after she is reminded about her  passing at which point she exhibits significant guilt for not being with him           Lines/Drains:          Lab Results: I have reviewed the following results:   Results from last 7 days   Lab Units 02/17/25  0134   WBC Thousand/uL 5.03   HEMOGLOBIN g/dL 13.7   HEMATOCRIT % 41.1   PLATELETS Thousands/uL 198     Results from last 7 days   Lab Units 02/19/25  0458   SODIUM mmol/L 140   POTASSIUM mmol/L 3.6   CHLORIDE mmol/L 102   CO2 mmol/L 31   BUN mg/dL 13   CREATININE mg/dL 0.85   ANION GAP mmol/L 7   CALCIUM mg/dL 9.2   GLUCOSE RANDOM mg/dL 86                       Recent Cultures (last 7 days):           Last 24 Hours Medication List:     Current Facility-Administered Medications:     acetaminophen (TYLENOL) tablet 650 mg, Q6H PRN    amiodarone tablet 100 mg, Daily    atorvastatin (LIPITOR) tablet 20 mg, Daily With Dinner    [Held by provider] bumetanide (BUMEX) tablet 0.5 mg, Daily    escitalopram (LEXAPRO) tablet 5 mg, Daily    hydrOXYzine HCL (ATARAX) tablet 25 mg, Q6H PRN    melatonin tablet 3 mg, HS    metoprolol succinate (TOPROL-XL) 24 hr tablet 12.5 mg, Daily    polyethylene glycol (MIRALAX) packet 17 g, Daily    prasugrel (EFFIENT) tablet 5 mg, Daily    rivaroxaban (XARELTO) tablet 15 mg, Daily With Breakfast    senna (SENOKOT) tablet 17.2 mg, BID    Administrative Statements   Today, Patient Was Seen By: Mary Hutson PA-C      **Please Note: This note may have been constructed using a voice recognition  system.**

## 2025-02-21 NOTE — PHYSICAL THERAPY NOTE
"   PT TREATMENT     25 1303   PT Last Visit   PT Visit Date 25   Note Type   Note Type Treatment   Pain Assessment   Pain Assessment Tool 0-10   Pain Score No Pain   Restrictions/Precautions   Other Precautions Cognitive;Fall Risk;Bed Alarm;Chair Alarm  (Eder on room air)   General   Chart Reviewed Yes   Family/Caregiver Present No   Cognition   Overall Cognitive Status Impaired   Arousal/Participation Cooperative   Attention Attends with cues to redirect   Orientation Level Oriented to person;Disoriented to place;Disoriented to time;Disoriented to situation   Memory Decreased recall of precautions;Decreased recall of recent events;Decreased short term memory   Following Commands Follows one step commands with increased time or repetition   Comments At least 2 pt idenfiers including name and    Subjective   Subjective \"Did you hear the news?\" \"My  \" \"I hope he didn't die because he thought I wasn't coming home and he didn't have a reason to live\" Pt perseverating on this last statement throughout PT session and is tearful intermittently. Pt also stating \"I have no reason to live now, I wish I could just go to sleep and not wake up\"   Bed Mobility   Additional Comments Pt received OOB in chair   Transfers   Sit to Stand 5  Supervision   Additional items Armrests;Assist x 1;Verbal cues;Increased time required  (cues for safe hand placement)   Stand to Sit 5  Supervision   Additional items Armrests;Assist x 1;Verbal cues;Increased time required  (cues for safe hand placement)   Toilet transfer 5  Supervision  (pt is SUP for hygiene after urination)   Additional items Assist x 1;Verbal cues;Increased time required  (with use of grab bar;pt needs min A for underwear and pad management and doffing/donning of underwear)   Ambulation/Elevation   Gait pattern Foward flexed;Decreased foot clearance;Short stride;Step through pattern  (pt amb with trunk, hip and knee flex)   Gait Assistance " "  (varies from SUP <--> min A for safety, RW control, placement and direction)   Additional items Assist x 1;Verbal cues;Tactile cues   Assistive Device Rolling walker;None   Distance 12 feet to and from the BR;Therapeutic rest;40 feet with change in direction with 2 standing rest breaks 2/2 to pt SOB;SAO2 95% on room air;both with RW. Pt then amb 12 feet with no AD and min A;pt will benefit from cont use of RW for safe amb despite amb w/out an AD PTA   Stair Management Assistance   (Pt declined stair trial - \"I'm just too tired and weak to do that\")   Balance   Static Sitting Good   Static Standing Fair  (w RW)   Ambulatory Fair -  (w RW)   Endurance Deficit   Endurance Deficit Yes   Endurance Deficit Description Pt needs therapeutic rest breaks throughout PT session;Pt SOB during amb needing 2 standing rest breaks   Activity Tolerance   Activity Tolerance Patient limited by fatigue;Treatment limited secondary to medical complications (Comment)  (impaired cognition;SOB;impaired endurance)   Assessment   Problem List Decreased strength;Decreased endurance;Impaired balance;Decreased mobility;Decreased coordination;Decreased cognition;Impaired judgement;Decreased safety awareness   Assessment Pt with limited tolerance to PT session today 2/2 to low endurance, easily fatigued, impaired cognition and SOB. Pt cont with overall decrease in safety with functional mobility and gait both with and without a RW. Pt cont to functional below baseline level of mobility - pt normally amb w/out an AD PTA but will benefit from cont use of a RW at this time. Pt also unable to amb up/down steps today 2/2 to feeling tired and weak. Pt remains appropriate for Level II Moderate Resource Intensity when medically stable for dc. Pt is not safe to be dc home - pt lives alone, cont to need varying assist, has impaired cognition and a decrease in safety. If pt were to go home, pt is at marked risk for falls and subsesquent readmission. Pt will " "cont to benefit from skilled PT services per PT POC while admitted. PT goals updated this session.    The patient's AM-[facility] Basic Mobility Inpatient Short Form Raw Score is 17. A Raw score of greater than 16 suggests the patient may benefit from discharge to home. Please also refer to the recommendation of the Physical Therapist for safe discharge planning.   Barriers to Discharge Inaccessible home environment;Decreased caregiver support   Barriers to Discharge Comments Pt lives alone;marked cognitive impairment;3 LUDWIN;pt functioning below baseline level of mobility   Goals   Patient Goals \"to go to sleep and not wake up\"   [facility] Expiration Date 03/03/25   Short Term Goal #1 Patient will: Perform all bed mobility tasks independently to improve pt's independence w/ repositioning for decrease risk of skin breakdown, Perform all transfers independently consistently from various height surfaces in order to improve I w/ engagement w/ real-world environments/situations, Ambulate at least 500+ ft.  With LRAD versus no AD independently w/o LOB to facilitate return and engagement w/ previous living environment, Navigate 3 stairs independently with unilateral handrail to either improve independence w/ entering home and/or so patient can fully access living areas in home, Increase all balance 1 grade to decrease risk for falls, Tolerate at least 30 consecutive minutes of activity to demonstrate improved activity tolerance and endurance, and Tolerate 3 hr OOB to faciliate upright tolerance   Plan   Treatment/Interventions ADL retraining;Functional transfer training;LE strengthening/ROM;Elevations;Therapeutic exercise;Endurance training;Cognitive reorientation;Patient/family training;Equipment eval/education;Bed mobility;Gait training;Compensatory technique education   PT Frequency 3-5x/wk   Discharge Recommendation   Rehab Resource Intensity Level, PT II (Moderate Resource Intensity)   AM-PAC Basic Mobility Inpatient   Turning in " Flat Bed Without Bedrails 3   Lying on Back to Sitting on Edge of Flat Bed Without Bedrails 3   Moving Bed to Chair 3   Standing Up From Chair Using Arms 3   Walk in Room 3   Climb 3-5 Stairs With Railing 2   Basic Mobility Inpatient Raw Score 17   Basic Mobility Standardized Score 39.67   [facility] Highest Level Of Mobility   -[facility] Goal 5: Stand one or more mins   -HLM Achieved 7: Walk 25 feet or more   Education   Education Provided Mobility training;Assistive device   Patient Reinforcement needed   End of Consult   Patient Position at End of Consult Bed/Chair alarm activated;Bedside chair;All needs within reach   End of Consult Comments Subjective comments as noted above were shared with MD, RN and CM via epic secure chat. Pt supported and encouraged by PT   Licensure   [place] License Number  Summer Garay PT

## 2025-02-21 NOTE — PROGRESS NOTES
02/21/25 1500   Clinical Encounter Type   Visited With Patient   Patient Spiritual Encounters   Suffering Severity   (Significant emotional suffering due to having recently been told of spouse's death)   Fear Level   (Somewhat, pertaining to her uncertainty about living arrangements and widowhood)   Coping 2

## 2025-02-21 NOTE — CASE MANAGEMENT
Case Management Progress Note    Patient name Verito Fallon  Location S /S -01 MRN [id number]  : 1940 Date 2025       LOS (days): 16  Geometric Mean LOS (GMLOS) (days): 3.8  Days to GMLOS:-12.2        OBJECTIVE:        Current admission status: Inpatient  Preferred Pharmacy:   [id number] [place], [place] - [address]  [address] [address]  Phone: [phone] Fax: [phone]    [facility] [place] [place] - [address]  [address] [address]  Phone: [phone] Fax: [phone]    Primary Care Provider: Robbie Warner DO    Primary Insurance: MEDICARE  Secondary Insurance: AETNA    PROGRESS NOTE:      CM called the non-emergency police number [phone] and requested the police do a wellness check on patient's daughter out of concern for well being and concern about patient's dc since family is not responding to us.  In addition she had missed our discussed deadline time to have a decision on placement choice for patient.      Patient also has not heard from either of her children today and has expressed wanting to go home.     Police have CM name and contact information to get back if contact is made.    Hospital supervisor, charge nurse, primary nurse, SLIM, and Case Management CC all aware of the above information.

## 2025-02-21 NOTE — PLAN OF CARE
Problem: Potential for Falls  Goal: Patient will remain free of falls  Description: INTERVENTIONS:  - Educate patient/family on patient safety including physical limitations  - Instruct patient to call for assistance with activity   - Consult OT/PT to assist with strengthening/mobility   - Keep Call bell within reach  - Keep bed low and locked with side rails adjusted as appropriate  - Keep care items and personal belongings within reach  - Initiate and maintain comfort rounds  - Make Fall Risk Sign visible to staff  - Offer Toileting every 2 Hours, in advance of need  - Initiate/Maintain bed/chair alarm  - Obtain necessary fall risk management equipment: yellow bracelet/socks  - Apply yellow socks and bracelet for high fall risk patients  - Consider moving patient to room near nurses station  Outcome: Progressing     Problem: NEUROSENSORY - ADULT  Goal: Achieves stable or improved neurological status  Description: INTERVENTIONS  - Monitor and report changes in neurological status  - Monitor vital signs such as temperature, blood pressure, glucose, and any other labs ordered   - Initiate measures to prevent increased intracranial pressure  - Monitor for seizure activity and implement precautions if appropriate      Outcome: Progressing  Goal: Remains free of injury related to seizures activity  Description: INTERVENTIONS  - Maintain airway, patient safety  and administer oxygen as ordered  - Monitor patient for seizure activity, document and report duration and description of seizure to physician/advanced practitioner  - If seizure occurs,  ensure patient safety during seizure  - Reorient patient post seizure  - Seizure pads on all 4 side rails  - Instruct patient/family to notify RN of any seizure activity including if an aura is experienced  - Instruct patient/family to call for assistance with activity based on nursing assessment  - Administer anti-seizure medications if ordered    Outcome: Progressing  Goal:  Achieves maximal functionality and self care  Description: INTERVENTIONS  - Monitor swallowing and airway patency with patient fatigue and changes in neurological status  - Encourage and assist patient to increase activity and self care.   - Encourage visually impaired, hearing impaired and aphasic patients to use assistive/communication devices  Outcome: Progressing

## 2025-02-21 NOTE — CASE MANAGEMENT
Case Management Discharge Planning Note    Patient name Verito Fallon  Location S /S -01 MRN [id number]  : 1940 Date 2025       Current Admission Date: 2025  Current Admission Diagnosis:Metabolic encephalopathy   Patient Active Problem List    Diagnosis Date Noted Date Diagnosed    Hypotension 2025     CKD (chronic kidney disease) 2025     Constipation 2025     Acute on chronic combined systolic and diastolic congestive heart failure (HCC) 2024     Chronic kidney disease, stage 3b (HCC) 2024     Osteoporosis 2024     Presence of permanent cardiac pacemaker 2024     History of seizure 12/15/2023     At risk for constipation 12/15/2023     At risk for delirium 12/15/2023     Advance care planning 12/15/2023     Hyperkalemia 12/15/2023     Leg hematoma, right, subsequent encounter 2023     Pruritic rash 2023     Cervical spondylosis      Cervical disc disorder with radiculopathy of mid-cervical region      Atherosclerosis with claudication of extremity ([facility]) 2021     Carotid stenosis, asymptomatic, bilateral 2021     Dementia ([facility]) 2021     Current use of long term anticoagulation 2021     Long term current use of amiodarone 2021     Renal artery stenosis ([facility]) 2021     Pulmonary hypertension ([facility]) 2021     Sick sinus syndrome ([facility]) 2021     Hx of CABG 2021     CAD (coronary artery disease) 2021     Depression 2021     Hyperlipidemia 2021     Metabolic encephalopathy 2021     A-fib ([facility]) 2021     PAD (peripheral artery disease) ([facility]) 2021     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 [address]  Phone: [phone] Fax: [phone]    Primary Care Provider: Robbie Warner DO    Primary Insurance: MEDICARE  Secondary Insurance: AETNA    DISCHARGE DETAILS:    Discharge planning discussed with:: Children Melyssa and Duane  Freedom of Choice: Yes  Comments - Freedom of Choice: VM's left requesting calls back to discuss rehab options  CM contacted family/caregiver?: Yes  Were Treatment Team discharge recommendations reviewed with patient/caregiver?: Yes  Did patient/caregiver verbalize understanding of patient care needs?: N/A- going to facility  Were patient/caregiver advised of the risks associated with not following Treatment Team discharge recommendations?: Yes    Contacts  Patient Contacts: Melyssa San (Daughter)  Relationship to Patient:: Family  Contact Method: Phone  Phone Number: [phone]  Reason/Outcome: Discharge Planning, Emergency Contact, Continuity of Care    Requested Home Health Care         Is the patient interested in HHC at discharge?: No    DME Referral Provided  Referral made for DME?: No    Other Referral/Resources/Interventions Provided:  Interventions: Short Term Rehab       CM did not hear back from patient's family by the time deadline provided of 12pm today with their decision on a rehab facility.  CM called both patient's daughter and son from both the desk phone and TEAMS numbers and left voicemails requesting a call back as patient is medically stable for DC today.    CM updated Case Management CC on communication issues with family as they are not returning any calls or making decisions.    CM department will continue to follow to assist with discharge coordination.

## 2025-02-21 NOTE — ASSESSMENT & PLAN NOTE
Wt Readings from Last 3 Encounters:   02/21/25 48.7 kg (107 lb 6.4 oz)   09/27/24 53 kg (116 lb 12.8 oz)   08/16/24 55.4 kg (122 lb 1.6 oz)   Blood pressure mildly low and weight down, but otherwise asymptomatic--would suggest holding Bumex for today and decreasing dose for discharge  I/O, Daily weights

## 2025-02-21 NOTE — ASSESSMENT & PLAN NOTE
Rate controlled   Continue Amiodarone  Continue Toprol XL--decrease dose to prevent hypotension  Continue Xarelto

## 2025-02-21 NOTE — ASSESSMENT & PLAN NOTE
Patient with history of cognitive impairment presented to the ER on 302 petition by family due to concern regarding suicidal statements while she was visiting her  in the nursing home.    Patient subsequently denied suicidal ideation but has now remained in the hospital for a prolonged period of time due to placement issues  Psychiatry consulted and ultimately had no further inpatient recommendations  Geriatrics consult appreciated - continue lexapro; as needed Atarax as needed for anxiety   passed away 2/18 while patient was hospitalized (he was at [facility])- continue to provide emotional support.  Patient typically asks every single day whether her  has passed as she does not recall  Neuropsych consulted and currently determined patient has no capacity.

## 2025-02-21 NOTE — CASE MANAGEMENT
Case Management Progress Note    Patient name Verito Fallon  Location S /S -01 MRN [id number]  : 1940 Date 2025       LOS (days): 16  Geometric Mean LOS (GMLOS) (days): 3.8  Days to GMLOS:-12.3        OBJECTIVE:        Current admission status: Inpatient  Preferred Pharmacy:   [id number] [place] [facility] [address]  [address] [address]  Phone: [phone] Fax: [phone]    [facility] [place], [place] - [address]  [address] [address]  Phone: [phone] Fax: [phone]    Primary Care Provider: Robbie Warner DO    Primary Insurance: MEDICARE  Secondary Insurance: AETNA    PROGRESS NOTE:      CM saw that family was at bedside.  CM went into patient's room and introduced self and role.  Family identified herself as being patient's granddaughter.  CM explained we have been trying to get in touch with patient's daughter and son all day.  Granddaughter reported that her uncle had gone back to [place] to get things ready for  and that she knew her mom was meeting with an  today.  CM advised that we had been calling daughter all day and that she was aware of the plan to provide a rehab choice to CM by noon today.    Granddaughter requested to speak with SLIM about patient's low blood pressures she was having last night.    CM left the room and updated SLIM and BRIANA CC.    CM department will continue to follow to assist with discharge coordination.

## 2025-02-22 NOTE — ASSESSMENT & PLAN NOTE
Rate controlled   Continue Amiodarone  Continue Toprol XL--decrease dosed to prevent hypotension  Continue Xarelto

## 2025-02-22 NOTE — DISCHARGE SUMMARY
Discharge Summary - Hospitalist   Name: Verito Fallon 84 y.o. female I MRN: [id number]  Unit/Bed#: S -01 I Date of Admission: 2/4/2025   Date of Service: 2/22/2025 I Hospital Day: 16     Assessment & Plan  Metabolic encephalopathy  Patient with history of cognitive impairment presented to the ER on 302 petition by family due to concern regarding suicidal statements while she was visiting her  in the nursing home.    Patient subsequently denied suicidal ideation but has now remained in the hospital for a prolonged period of time due to placement issues  Psychiatry consulted and ultimately had no further inpatient recommendations  Geriatrics consult appreciated - continue lexapro; as needed Atarax as needed for anxiety   passed away 2/18 while patient was hospitalized (he was at [facility])- continue to provide emotional support.  Patient typically asks every single day whether her  has passed as she does not recall  Neuropsych consulted and currently determined patient has no capacity.      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  Continue low-dose beta-blocker and hold ACE inhibitor  I/O, Daily weights  A-fib (HCC)  Rate controlled   Continue Amiodarone  Continue Toprol XL--decrease dosed to prevent hypotension  Continue Xarelto  CAD (coronary artery disease)  S/p CABG   Continue Prasugrel, BB, statin      Medical Problems       Resolved Problems  Date Reviewed: 2/22/2025          Resolved    UTI (urinary tract infection) 2/20/2025     Resolved by  Kasey Shirley PA-C    Hypokalemia 2/20/2025     Resolved by  Kasey Shirley PA-C        Discharging Physician /  Practitioner: Mary Hutson PA-C  PCP: Robbie Warner DO  Admission Date:   Admission Orders (From admission, onward)       Ordered        02/05/25 1357  INPATIENT ADMISSION  Once            02/04/25 1825  Place in Observation  Once                          Discharge Date: 02/22/25    Consultations During Hospital Stay:  Psychiatry  Geriatrics  Neuropsych    Procedures Performed:   None    Significant Findings / Test Results:   As above    Incidental Findings:   none    Test Results Pending at Discharge (will require follow up):   none     Outpatient Tests Requested:  Routine CBC BMP    Complications:      Reason for Admission: Brought in by family for suicidal ideations    Hospital Course:   Verito Fallon is a 84 y.o. female patient with underlying history of combined heart failure, A-fib and coronary artery disease brought in to the hospital on 2/4/2025 due to family concerned that she was making suicidal statements while visiting her  in the nursing home.  Her daughter reported steadily declining cognition and memory loss.  Patient was seen by psychiatry and geriatrics.  It was felt that she was not meeting criteria for psychiatric hospitalization or changes in medication regimen.  She was seen by neuropsych and was deemed to not have capacity to make her own decisions.  She was treated with full close of ceftriaxone for concern of possible UTI contributing to symptoms.  She completed her course.  She was initially maintained on all her usual medications for heart failure but by the time of discharge her metoprolol dose had to be decreased and her Bumex dose changed to as needed given decrease in her appetite and oral intake.  Her blood pressure was also noted to be intermittently mildly low though she was asymptomatic.  She was seen by PT and OT.  Over the course of her prolonged hospitalization her mobility did improve.  There were still diffuse facilities that were willing to accept her for short-term  rehab and myself and case management had an extensive discussion with patient's family regarding outpatient options at this time she today      Please see above list of diagnoses and related plan for additional information.     Condition at Discharge: stable    Discharge Day Visit / Exam:   * Please refer to separate progress note for these details *    Discussion with Family:     Discharge instructions/Information to patient and family:   See after visit summary for information provided to patient and family.      Provisions for Follow-Up Care:  See after visit summary for information related to follow-up care and any pertinent home health orders.      Mobility at time of Discharge:   Basic Mobility Inpatient Raw Score: 17  JH-HLM Goal: 5: Stand one or more mins  JH-HLM Achieved: 7: Walk 25 feet or more  HLM Goal achieved. Continue to encourage appropriate mobility.     Disposition:   Short-term rehab at [facility]    Planned Readmission:     Discharge Medications:  See after visit summary for reconciled discharge medications provided to patient and/or family.      Administrative Statements   Discharge Statement:  I have spent a total time of 40 minutes in caring for this patient on the day of the visit/encounter. .    **Please Note: This note may have been constructed using a voice recognition system**

## 2025-02-22 NOTE — ASSESSMENT & PLAN NOTE
Patient with history of cognitive impairment presented to the ER on 302 petition by family due to concern regarding suicidal statements while she was visiting her  in the nursing home.    Patient subsequently denied suicidal ideation but has now remained in the hospital for a prolonged period of time due to placement issues  Psychiatry consulted and ultimately had no further inpatient recommendations  Geriatrics consult appreciated - continue lexapro; as needed Atarax as needed for anxiety   passed away 2/18 while patient was hospitalized (he was at [facility])- continue to provide emotional support.  Patient typically asks every single day whether her  has passed as she does not recall  Neuropsych consulted and currently determined patient has no capacity.

## 2025-02-22 NOTE — ASSESSMENT & PLAN NOTE
Wt Readings from Last 3 Encounters:   02/21/25 48.7 kg (107 lb 6.4 oz)   09/27/24 53 kg (116 lb 12.8 oz)   08/16/24 55.4 kg (122 lb 1.6 oz)   Blood pressure mildly low and weight down, but otherwise asymptomatic--would suggest holding Bumex for today and decreasing dose for discharge to prn dosing.  Continue low-dose beta-blocker and hold ACE inhibitor  I/O, Daily weights

## 2025-02-24 ENCOUNTER — PATIENT OUTREACH (OUTPATIENT)
Dept: CASE MANAGEMENT | Facility: OTHER | Age: 85
End: 2025-02-24

## 2025-02-24 ENCOUNTER — TRANSITIONAL CARE MANAGEMENT (OUTPATIENT)
Dept: FAMILY MEDICINE CLINIC | Facility: MEDICAL CENTER | Age: 85
End: 2025-02-24

## 2025-02-24 NOTE — PROGRESS NOTES
Outpatient Care Management BRAXTON/SNF Pathway. Discharged 2/21/25 to [facility]. Email sent to facility to inform them I will be following the patient during their skilled stay.  This Admin Coordinator will continue to monitor via chart review.

## 2025-02-27 ENCOUNTER — PATIENT OUTREACH (OUTPATIENT)
Dept: CASE MANAGEMENT | Facility: OTHER | Age: 85
End: 2025-02-27

## 2025-03-06 ENCOUNTER — PATIENT OUTREACH (OUTPATIENT)
Dept: CASE MANAGEMENT | Facility: OTHER | Age: 85
End: 2025-03-06

## 2025-03-06 ENCOUNTER — RA CDI HCC (OUTPATIENT)
Dept: OTHER | Facility: HOSPITAL | Age: 85
End: 2025-03-06

## 2025-03-06 NOTE — PROGRESS NOTES
HCC coding opportunities          Chart Reviewed number of suggestions sent to Provider: 3     Patients Insurance   I13.0, I27.20 and I49.5  Medicare Insurance: Medicare

## 2025-03-12 ENCOUNTER — OFFICE VISIT (OUTPATIENT)
Dept: FAMILY MEDICINE CLINIC | Facility: MEDICAL CENTER | Age: 85
End: 2025-03-12
Payer: MEDICARE

## 2025-03-12 VITALS
OXYGEN SATURATION: 96 % | WEIGHT: 115.2 LBS | DIASTOLIC BLOOD PRESSURE: 70 MMHG | HEART RATE: 76 BPM | TEMPERATURE: 98.2 F | RESPIRATION RATE: 18 BRPM | BODY MASS INDEX: 23.22 KG/M2 | HEIGHT: 59 IN | SYSTOLIC BLOOD PRESSURE: 154 MMHG

## 2025-03-12 DIAGNOSIS — I48.0 PAROXYSMAL ATRIAL FIBRILLATION (HCC): ICD-10-CM

## 2025-03-12 DIAGNOSIS — F32.89 OTHER DEPRESSION: ICD-10-CM

## 2025-03-12 DIAGNOSIS — I50.42 CHRONIC COMBINED SYSTOLIC AND DIASTOLIC CONGESTIVE HEART FAILURE (HCC): ICD-10-CM

## 2025-03-12 DIAGNOSIS — I73.9 PAD (PERIPHERAL ARTERY DISEASE) (HCC): ICD-10-CM

## 2025-03-12 DIAGNOSIS — I70.1 RENAL ARTERY STENOSIS (HCC): ICD-10-CM

## 2025-03-12 DIAGNOSIS — I65.23 CAROTID STENOSIS, ASYMPTOMATIC, BILATERAL: ICD-10-CM

## 2025-03-12 DIAGNOSIS — Z09 HOSPITAL DISCHARGE FOLLOW-UP: Primary | ICD-10-CM

## 2025-03-12 DIAGNOSIS — F03.90 DEMENTIA, UNSPECIFIED DEMENTIA SEVERITY, UNSPECIFIED DEMENTIA TYPE, UNSPECIFIED WHETHER BEHAVIORAL, PSYCHOTIC, OR MOOD DISTURBANCE OR ANXIETY (HCC): ICD-10-CM

## 2025-03-12 DIAGNOSIS — I25.810 CORONARY ARTERY DISEASE INVOLVING OTHER CORONARY ARTERY BYPASS GRAFT WITHOUT ANGINA PECTORIS: ICD-10-CM

## 2025-03-12 DIAGNOSIS — N18.32 CHRONIC KIDNEY DISEASE, STAGE 3B (HCC): ICD-10-CM

## 2025-03-12 DIAGNOSIS — E78.2 MIXED HYPERLIPIDEMIA: ICD-10-CM

## 2025-03-12 DIAGNOSIS — K59.00 CONSTIPATION, UNSPECIFIED CONSTIPATION TYPE: ICD-10-CM

## 2025-03-12 PROCEDURE — 99214 OFFICE O/P EST MOD 30 MIN: CPT | Performed by: STUDENT IN AN ORGANIZED HEALTH CARE EDUCATION/TRAINING PROGRAM

## 2025-03-12 PROCEDURE — G2211 COMPLEX E/M VISIT ADD ON: HCPCS | Performed by: STUDENT IN AN ORGANIZED HEALTH CARE EDUCATION/TRAINING PROGRAM

## 2025-03-12 RX ORDER — SENNOSIDES 8.6 MG
17.2 TABLET ORAL DAILY
Start: 2025-03-12 | End: 2025-03-19

## 2025-03-12 RX ORDER — ESCITALOPRAM OXALATE 10 MG/1
10 TABLET ORAL DAILY
Qty: 90 TABLET | Refills: 1 | Status: SHIPPED | OUTPATIENT
Start: 2025-03-12 | End: 2025-09-08

## 2025-03-12 NOTE — ASSESSMENT & PLAN NOTE
Component of grief  Encouraged patient to establish with counseling services  Uptitrate Lexapro to 10 mg p.o. daily    Orders:    escitalopram (LEXAPRO) 10 mg tablet; Take 1 tablet (10 mg total) by mouth daily

## 2025-03-12 NOTE — ASSESSMENT & PLAN NOTE
Reminded to schedule follow-up with cardiology  Rate and rhythm controlled  Anticoagulated with Xarelto

## 2025-03-12 NOTE — ASSESSMENT & PLAN NOTE
Wt Readings from Last 3 Encounters:   03/12/25 52.3 kg (115 lb 3.2 oz)   02/21/25 48.7 kg (107 lb 6.4 oz)   09/27/24 53 kg (116 lb 12.8 oz)     Bumex as needed, reviewed as needed instructions with daughter

## 2025-03-12 NOTE — ASSESSMENT & PLAN NOTE
Has been having regular bowel movements  Senna Or MiraLAX as needed  Orders:    senna (SENOKOT) 8.6 mg; Take 2 tablets (17.2 mg total) by mouth daily for 7 days

## 2025-03-12 NOTE — ASSESSMENT & PLAN NOTE
Reminded to schedule follow-up with cardiology  Blood pressure stable  Continue daily statin  She is continued on Effient as well

## 2025-03-12 NOTE — ASSESSMENT & PLAN NOTE
Lab Results   Component Value Date    EGFR 62 02/19/2025    EGFR 46 02/17/2025    EGFR 56 02/14/2025    CREATININE 0.85 02/19/2025    CREATININE 1.08 02/17/2025    CREATININE 0.93 02/14/2025

## 2025-03-13 ENCOUNTER — PATIENT OUTREACH (OUTPATIENT)
Dept: CASE MANAGEMENT | Facility: OTHER | Age: 85
End: 2025-03-13

## 2025-03-13 ENCOUNTER — TELEPHONE (OUTPATIENT)
Age: 85
End: 2025-03-13

## 2025-03-13 NOTE — TELEPHONE ENCOUNTER
Patient's daughter calling back, made aware of Dr. Warner's instructions- she wanted to clarify that [facility] nursing was giving her 5 mg of lexapro 3 x per day- not 15mg 3 x.  Patient's daughter will follow the script Dr. Warner called in yesterday.

## 2025-03-13 NOTE — PROGRESS NOTES
Update obtained from [facility] the patient discharged 3/12/25 to Home. I updated the care coordination note, removed myself as the responsible staff, added the appropriate responsible staff.      A email was sent to the facility requesting discharge instructions. When Admin Coordinator has received the Discharge paperwork  Admin Coordinator will attach to this encounter.

## 2025-03-13 NOTE — TELEPHONE ENCOUNTER
Daughter said [facility] was giving her mother Lexapro 15mg 3 times a day. She will fax over document that was given to her for doctor to review.

## 2025-03-14 ENCOUNTER — PATIENT OUTREACH (OUTPATIENT)
Dept: CASE MANAGEMENT | Facility: OTHER | Age: 85
End: 2025-03-14

## 2025-03-14 NOTE — PROGRESS NOTES
Pt in BRAXTON episode until 3/21.Call placed to pt's daughter Melyssa. Explained role and care management. Daughter states that pt is doing fine and sounded  hurried on the call. She also stated that pt did have her follow up with Dr Mills and there are no further needs or concerns, no new lab work ordered when inquired. Asked if pts weight is being checked daily and to be sure that pt is drinking fluids throughout the day. She states that pt has someone with her at all times and she appreciated the call.   Will follow until next week through chart review.

## 2025-03-21 ENCOUNTER — PATIENT OUTREACH (OUTPATIENT)
Dept: CASE MANAGEMENT | Facility: OTHER | Age: 85
End: 2025-03-21

## 2025-03-21 NOTE — PROGRESS NOTES
Weekly chart review for BRAXTON. No utilization or appts since last review/call. BRAXTON pathway ends today. Closing

## 2025-04-03 ENCOUNTER — TELEPHONE (OUTPATIENT)
Age: 85
End: 2025-04-03

## 2025-04-03 NOTE — TELEPHONE ENCOUNTER
Daughter called and stated patient was accepted into [facility].The facility is requiring a DME form to be filled out.Daughter will be faxing form to the office today.

## 2025-04-08 DIAGNOSIS — F41.8 DEPRESSION WITH ANXIETY: ICD-10-CM

## 2025-04-08 NOTE — TELEPHONE ENCOUNTER
Pt daughter following up again as she has a meeting with them Thursday and would like to have all her paperwork and correct med list by then. Please call back to advise pt when completed.

## 2025-04-08 NOTE — TELEPHONE ENCOUNTER
Patient daughter called back to follow up on DME forms and would like a call back once completed. Not in media. Please advise. Thank you.

## 2025-04-09 RX ORDER — SERTRALINE HYDROCHLORIDE 25 MG/1
25 TABLET, FILM COATED ORAL DAILY
Qty: 30 TABLET | Refills: 5 | OUTPATIENT
Start: 2025-04-09

## 2025-04-22 ENCOUNTER — TELEPHONE (OUTPATIENT)
Age: 85
End: 2025-04-22

## 2025-04-22 NOTE — TELEPHONE ENCOUNTER
Received a call from the daughter, Verito can't find her pacemaker, in the process of moving.     Contacted device. Spoke to Supriya dobson and [employer]

## 2025-04-28 ENCOUNTER — TELEPHONE (OUTPATIENT)
Age: 85
End: 2025-04-28

## 2025-04-28 NOTE — TELEPHONE ENCOUNTER
Saul from [facility] called to schedule a TCM appointment for the patient.  Please give him a call back at [phone].    Please advise, thank you.

## 2025-04-28 NOTE — TELEPHONE ENCOUNTER
Saul from [facility] called stating the patient needs a TCM appt.     Patient just arrived at [facility] this past weekend and needs to be set up as a New Patient with Dr. Rodas     It could have become confused because no mention of the patient being scheduled as New was mentioned. Only that a TCM had to be scheduled.

## 2025-04-28 NOTE — TELEPHONE ENCOUNTER
S/w Saul-wrong phone number was given.  Appt was NOT for a TCM, he was calling to let us know pt arrived over the weekend and will probably be staying permanently, and seeing the in house doctor.  Not sure how this got confused.

## 2025-05-09 ENCOUNTER — OFFICE VISIT (OUTPATIENT)
Dept: GERIATRICS | Facility: OTHER | Age: 85
End: 2025-05-09
Payer: MEDICARE

## 2025-05-09 VITALS
HEIGHT: 59 IN | BODY MASS INDEX: 23.99 KG/M2 | OXYGEN SATURATION: 98 % | SYSTOLIC BLOOD PRESSURE: 150 MMHG | HEART RATE: 78 BPM | TEMPERATURE: 97.6 F | DIASTOLIC BLOOD PRESSURE: 70 MMHG | WEIGHT: 119 LBS

## 2025-05-09 DIAGNOSIS — I48.0 PAROXYSMAL ATRIAL FIBRILLATION (HCC): Primary | ICD-10-CM

## 2025-05-09 DIAGNOSIS — F03.B3 MODERATE DEMENTIA WITH MOOD DISTURBANCE, UNSPECIFIED DEMENTIA TYPE (HCC): ICD-10-CM

## 2025-05-09 DIAGNOSIS — Z79.01 CURRENT USE OF LONG TERM ANTICOAGULATION: ICD-10-CM

## 2025-05-09 DIAGNOSIS — F01.B3 MODERATE VASCULAR DEMENTIA WITH MOOD DISTURBANCE (HCC): ICD-10-CM

## 2025-05-09 DIAGNOSIS — I25.810 CORONARY ARTERY DISEASE INVOLVING OTHER CORONARY ARTERY BYPASS GRAFT WITHOUT ANGINA PECTORIS: ICD-10-CM

## 2025-05-09 DIAGNOSIS — I50.42 CHRONIC COMBINED SYSTOLIC AND DIASTOLIC CONGESTIVE HEART FAILURE (HCC): ICD-10-CM

## 2025-05-09 DIAGNOSIS — R26.2 AMBULATORY DYSFUNCTION: ICD-10-CM

## 2025-05-09 DIAGNOSIS — R29.6 RECURRENT FALLS: ICD-10-CM

## 2025-05-09 PROCEDURE — 99214 OFFICE O/P EST MOD 30 MIN: CPT | Performed by: FAMILY MEDICINE

## 2025-05-09 NOTE — ASSESSMENT & PLAN NOTE
With recurrent falls  Risk of bleeding on xarelto + effient (underlying Afib and CAD, cardiology following)  Had fall this morning, stable neurologic exam and no signs of acute bony injury on exam- monitor per personal care fall protocol  Low threshold for check CT head (although no outward signs on trauma) for mental status change   Plan for xray of R hip and femur if ongoing symptoms of pain or change in status (unable to bear weight, etc)  Order scheduled acetaminophen 650mg TID x5 days, then resume PRN order  PT referral placed

## 2025-05-09 NOTE — PROGRESS NOTES
[facility]  [facility]    NAME: Verito Fallon  AGE: 84 y.o. SEX: female    DATE OF ENCOUNTER: 5/9/25    Assessment and Plan     Problem List Items Addressed This Visit          Cardiovascular and Mediastinum    A-fib (HCC) - Primary    Continue metoprolol for rate control, amiodarone for rhythm control  Labs ordered: CBC wdiff, CMP; TSH recently checked and WNL- monitor thyroid labs every 6 months- plan to recheck with next routine labs in setting of chronic amiodarone use  On chronic anticoagulation with xarelto  Chronic pacemaker in place         Relevant Orders    Comprehensive metabolic panel    CBC and differential    CAD (coronary artery disease)    S/p CABG (2011) and PCI with LAMINE (2021)  Continue atorvastatin  Continues on effient- in setting of recurrent falls, consider discontinuation if able as patient is also on xarelto for Afib, will message cardiology to review in addition to routine cardiology follow up         Chronic combined systolic and diastolic congestive heart failure (HCC)    Wt Readings from Last 3 Encounters:   05/09/25 54 kg (119 lb)   03/12/25 52.3 kg (115 lb 3.2 oz)   02/21/25 48.7 kg (107 lb 6.4 oz)     Examines euvolemic at time of office visit today; lung clear, minimal lower extremity edema  Has PRN bumex order for weight gain- received a dose yesterday (5/8/25)  Continue to monitor weight and volume status closely            Nervous and Auditory    Dementia (HCC)    Vascular vs mixed type  Short term memory deficits noted in office today  Associated mood disorder on lexapro  Speech therapy consulted for MoCA and evaluation and treatment with cognitive therapy  TSH recently checked and WNL  B12 and folic acid levels ordered  Most recent CT head showing chronic microvascular changes          Relevant Orders    Vitamin B12    Folate    Ambulatory Referral to Speech Therapy    Vitamin B12    Folate       Care Coordination    Ambulatory dysfunction     With recurrent falls  Risk of bleeding on xarelto + effient (underlying Afib and CAD, cardiology following)  Had fall this morning, stable neurologic exam and no signs of acute bony injury on exam- monitor per personal care fall protocol  Low threshold for check CT head (although no outward signs on trauma) for mental status change   Plan for xray of R hip and femur if ongoing symptoms of pain or change in status (unable to bear weight, etc)  Order scheduled acetaminophen 650mg TID x5 days, then resume PRN order  PT referral placed         Relevant Orders    Ambulatory Referral to Physical Therapy    Recurrent falls    Relevant Orders    Ambulatory Referral to Physical Therapy       Other    Current use of long term anticoagulation       Orders Placed This Encounter   Procedures    Comprehensive metabolic panel    Vitamin B12    Folate    CBC and differential    Ambulatory Referral to Speech Therapy    Ambulatory Referral to Physical Therapy       Chief Complaint     Chief Complaint   Patient presents with    New Patient Visit       History of Present Illness     84 year old female presents to [facility] primary care. Recent move into personal care apartment at [facility]. Previously resided in [place]. Fall reported this morning without acute injury, no known head strike. Is on xarelto and effient. Notes some discomfort in right hip area due to bruising from fall. No pain or discomfort when ambulating, no difficulty weight bearing.         The following portions of the patient's history were reviewed and updated as appropriate: allergies, current medications, past family history, past medical history, past social history, past surgical history and problem list.    Review of Systems     Review of Systems   Constitutional:  Positive for unexpected weight change (weight gain of 5lb reported by  nursing on 5/8- received PRN bumex on 5/8). Negative for activity change, appetite change and fever.  "  Respiratory:  Negative for cough, chest tightness and shortness of breath.    Cardiovascular:  Negative for chest pain, palpitations and leg swelling.   Musculoskeletal:  Positive for arthralgias. Negative for gait problem.       Active Problem List     Patient Active Problem List   Diagnosis    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)    A-fib (HCC)    PAD (peripheral artery disease) (HCC)    Hyperlipidemia    Metabolic encephalopathy    Depression    Current use of long term anticoagulation    Long term current use of amiodarone    Renal artery stenosis (HCC)    Pulmonary hypertension (HCC)    Sick sinus syndrome (HCC)    Hx of CABG    CAD (coronary artery disease)    Dementia (HCC)    Atherosclerosis with claudication of extremity (HCC)    Carotid stenosis, asymptomatic, bilateral    Cervical disc disorder with radiculopathy of mid-cervical region    Cervical spondylosis    Pruritic rash    Leg hematoma, right, subsequent encounter    History of seizure    At risk for constipation    At risk for delirium    Advance care planning    Hyperkalemia    Chronic kidney disease, stage 3b (HCC)    Osteoporosis    Presence of permanent cardiac pacemaker    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   Constitutional:       General: She is awake. She is not in acute distress.     Appearance: She is well-groomed. She is not toxic-appearing or diaphoretic.   HENT:      Head: Normocephalic and atraumatic. No abrasion, contusion, " right periorbital erythema or left periorbital erythema.      Mouth/Throat:      Mouth: Mucous membranes are moist.   Eyes:      General: No scleral icterus.        Right eye: No discharge.         Left eye: No discharge.   Cardiovascular:      Rate and Rhythm: Normal rate and regular rhythm.      Heart sounds: S1 normal and S2 normal.      Comments: Nonpitting edema to pretibial area bilaterally  Pulmonary:      Effort: Pulmonary effort is normal. No respiratory distress.      Breath sounds: No wheezing, rhonchi or rales.   Abdominal:      General: There is no distension.      Palpations: Abdomen is soft.   Musculoskeletal:      Cervical back: No rigidity or tenderness.      Right hip: Tenderness present. No bony tenderness. Normal range of motion.      Right upper leg: No tenderness or bony tenderness.      Right lower le+ Edema present.      Left lower le+ Edema present.   Skin:     General: Skin is warm and dry.      Coloration: Skin is not jaundiced or pale.   Neurological:      Mental Status: She is alert.      Cranial Nerves: No dysarthria or facial asymmetry.   Psychiatric:         Behavior: Behavior is cooperative.         Cognition and Memory: She exhibits impaired recent memory.       Pertinent Laboratory/Diagnostic Studies:  Lab Results   Component Value Date    WBC 5.03 2025    HGB 13.7 2025    HCT 41.1 2025    MCV 91 2025     2025     Lab Results   Component Value Date    SODIUM 140 2025    K 3.6 2025     2025    CO2 31 2025    BUN 13 2025    CREATININE 0.85 2025    GLUC 86 2025    CALCIUM 9.2 2025     Lab Results   Component Value Date    WGS4XBGIWNOB 3.004 2025          CT head (23) moderate chronic microvascular changes; stable bilateral basal ganglia calcifications    Current Medications     Current Outpatient Medications:     acetaminophen (TYLENOL) 325 mg tablet, Take 2 tablets (650 mg  total) by mouth every 4 (four) hours as needed for mild pain, Disp: , Rfl: 0    amiodarone 200 mg tablet, Take 0.5 tablets (100 mg total) by mouth daily, Disp: 45 tablet, Rfl: 3    atorvastatin (LIPITOR) 20 mg tablet, Take 1 tablet (20 mg total) by mouth daily with dinner, Disp: 90 tablet, Rfl: 4    bumetanide (BUMEX) 0.5 MG tablet, Take 1 tablet (0.5 mg total) by mouth daily as needed (Lower extremity edema, 2lb weight gain in 24 hours or 5 lbs in 1 week) for up to 30 doses, Disp: 30 tablet, Rfl: 0    escitalopram (LEXAPRO) 10 mg tablet, Take 1 tablet (10 mg total) by mouth daily, Disp: 90 tablet, Rfl: 1    metoprolol succinate (TOPROL-XL) 25 mg 24 hr tablet, Take 0.5 tablets (12.5 mg total) by mouth daily, Disp: 15 tablet, Rfl: 0    nitroglycerin (NITROSTAT) 0.4 mg SL tablet, Place 1 tablet (0.4 mg total) under the tongue every 5 (five) minutes as needed for chest pain, Disp: 25 tablet, Rfl: 0    polyethylene glycol (MIRALAX) 17 g packet, Take 17 g by mouth daily for 7 days, Disp: 119 g, Rfl: 0    prasugrel (EFFIENT) tablet, 1 TABLET DAILY, Disp: 30 tablet, Rfl: 3    rivaroxaban (Xarelto) 15 mg tablet, Take 1 tablet (15 mg total) by mouth daily with breakfast, Disp: 90 tablet, Rfl: 3    Beatriz Rodas,   5/9/25

## 2025-05-09 NOTE — PATIENT INSTRUCTIONS
-Referral to [facility] speech therapy for MoCA and cognitive therapy  -Blood work ordered to be drawn in 1-2 weeks: CBC, CMP, B12, folic acid level  -Acetaminophen 650mg three times daily x5 days for pain control s/p fall; resume PRN dosing when scheduled order complete  -Follow up in 6 weeks

## 2025-05-09 NOTE — ASSESSMENT & PLAN NOTE
Wt Readings from Last 3 Encounters:   05/09/25 54 kg (119 lb)   03/12/25 52.3 kg (115 lb 3.2 oz)   02/21/25 48.7 kg (107 lb 6.4 oz)     Examines euvolemic at time of office visit today; lung clear, minimal lower extremity edema  Has PRN bumex order for weight gain- received a dose yesterday (5/8/25)  Continue to monitor weight and volume status closely

## 2025-05-09 NOTE — ASSESSMENT & PLAN NOTE
S/p CABG (2011) and PCI with LAMINE (2021)  Continue atorvastatin  Continues on effient- in setting of recurrent falls, consider discontinuation if able as patient is also on xarelto for Afib, will message cardiology to review in addition to routine cardiology follow up

## 2025-05-09 NOTE — ASSESSMENT & PLAN NOTE
Vascular vs mixed type  Short term memory deficits noted in office today  Associated mood disorder on lexapro  Speech therapy consulted for MoCA and evaluation and treatment with cognitive therapy  TSH recently checked and WNL  B12 and folic acid levels ordered  Most recent CT head showing chronic microvascular changes

## 2025-05-09 NOTE — ASSESSMENT & PLAN NOTE
Continue metoprolol for rate control, amiodarone for rhythm control  Labs ordered: CBC wdiff, CMP; TSH recently checked and WNL- monitor thyroid labs every 6 months- plan to recheck with next routine labs in setting of chronic amiodarone use  On chronic anticoagulation with xarelto  Chronic pacemaker in place

## 2025-05-12 NOTE — PROGRESS NOTES
Collaboration with cardiology on Friday 5/9 for medication review. Recurrent falls and high fall risk, with higher risk of bleeding on both xarelto and effient. Will discontinue effient. Personal care coordinator notified to discontinue effient. Voicemail left for daughter Melyssa.

## 2025-05-21 RX ORDER — GABAPENTIN 100 MG/1
CAPSULE ORAL
Qty: 60 CAPSULE | Refills: 1 | OUTPATIENT
Start: 2025-05-21

## 2025-05-22 ENCOUNTER — IN HOME VISIT (OUTPATIENT)
Dept: GERIATRICS | Facility: OTHER | Age: 85
End: 2025-05-22
Payer: MEDICARE

## 2025-05-22 DIAGNOSIS — R60.0 BILATERAL LOWER EXTREMITY EDEMA: ICD-10-CM

## 2025-05-22 PROCEDURE — 99348 HOME/RES VST EST LOW MDM 30: CPT

## 2025-05-27 ENCOUNTER — OFFICE VISIT (OUTPATIENT)
Dept: GERIATRICS | Facility: OTHER | Age: 85
End: 2025-05-27
Payer: MEDICARE

## 2025-05-27 DIAGNOSIS — I50.42 CHRONIC COMBINED SYSTOLIC AND DIASTOLIC CONGESTIVE HEART FAILURE (HCC): Primary | ICD-10-CM

## 2025-05-27 DIAGNOSIS — E53.8 VITAMIN B12 DEFICIENCY: ICD-10-CM

## 2025-05-27 DIAGNOSIS — I13.0 HYPERTENSIVE HEART AND RENAL DISEASE WITH HEART FAILURE AND RENAL FAILURE (HCC): Chronic | ICD-10-CM

## 2025-05-27 DIAGNOSIS — D51.9 ANEMIA DUE TO VITAMIN B12 DEFICIENCY, UNSPECIFIED B12 DEFICIENCY TYPE: ICD-10-CM

## 2025-05-27 PROCEDURE — 99350 HOME/RES VST EST HIGH MDM 60: CPT | Performed by: NURSE PRACTITIONER

## 2025-05-27 NOTE — PROGRESS NOTES
Name: Verito Fallon      : 1940      MRN: [id number]  Encounter Provider: KATHY Garcia  Encounter Date: 2025   Encounter department: [facility] Home Visit - POS 13    Assessment & Plan  Chronic combined systolic and diastolic congestive heart failure (HCC)  Wt Readings from Last 3 Encounters:   25 58.9 kg (129 lb 12.8 oz)   25 54 kg (119 lb)   25 52.3 kg (115 lb 3.2 oz)   Patient with significant weight gain over the past month.  Increased bilateral lower extremity edema present  Lungs sound clear, no sob reported.  Will schedule Bumex 0.5 mg daily for 3 days with 20 meq of KCL  Hold prn Bumex while receiving scheduled dose for 3 days then resume prn dose.  Repeat blood work ordered for 25.          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  Follow-up with Cardiology as scheduled.        Anemia due to vitamin B12 deficiency, unspecified B12 deficiency type  Hemoglobin drop from 13.7 to 9.4  Will order hemetest stools times three  Start vitamin B-12 500 mcg po daily  Repeat CBC ordered           History of Present Illness   HPI  Verito Fallon is a 84 y.o. female who presents today for follow-up on the personal care unit at [facility]. Nursing reports weight gain of 14.8 lbs since admission to [facility] on 2025. No chest pain or sob reported. Daughter reports increased bilateral lower extremity edema.    History obtained from: patient's child and patient's caregiver    Review of Systems   Respiratory:  Negative for chest tightness and shortness of breath.    Cardiovascular:  Positive for leg swelling. Negative for chest pain.   Gastrointestinal:  Negative for abdominal pain, constipation and diarrhea.   Genitourinary:  Negative for difficulty urinating and dysuria.   Musculoskeletal:  Negative for arthralgias and myalgias.   Neurological:  Negative for dizziness and headaches.   Psychiatric/Behavioral:  Positive for confusion. The patient is not nervous/anxious.      Pertinent Medical History   Reviewed in EMR.          Objective   BP (!) 186/83   Pulse 82   Temp 97.9 °F (36.6 °C)   Resp 18   Wt 58.9 kg (129 lb 12.8 oz)   SpO2 96% Comment: RA  BMI 26.22 kg/m²     Physical Exam  Vitals and nursing note reviewed.   Constitutional:       General: She is not in acute distress.     Appearance: Normal appearance. She is not ill-appearing, toxic-appearing or diaphoretic.   HENT:      Head: Normocephalic and atraumatic.     Cardiovascular:      Rate and Rhythm: Normal rate and regular rhythm.      Pulses: Normal pulses.   Pulmonary:      Effort: Pulmonary effort is normal.      Breath sounds: Normal breath sounds.   Abdominal:      General: There is no distension.      Palpations: Abdomen is soft.      Tenderness: There is no abdominal tenderness.     Musculoskeletal:         General: Normal range of motion.      Right lower leg: Edema present.      Left lower leg: Edema present.     Skin:     General: Skin is warm and dry.     Neurological:      General: No focal deficit present.      Mental Status: She is alert. She is disoriented.      Cranial Nerves: No cranial nerve deficit.      Motor: No weakness.     Psychiatric:         Mood and Affect: Mood normal.         Behavior: Behavior normal.         Administrative Statements   I have spent a total time of 60 minutes in caring for this patient on the day of the  visit/encounter including Diagnostic results, Risks and benefits of tx options, Instructions for management, Patient and family education, Risk factor reductions, Counseling / Coordination of care, Documenting in the medical record, Reviewing/placing orders in the medical record (including tests, medications, and/or procedures), Obtaining or reviewing history  , and Communicating with other healthcare professionals .

## 2025-05-28 VITALS
WEIGHT: 129.8 LBS | TEMPERATURE: 97.9 F | BODY MASS INDEX: 26.22 KG/M2 | HEART RATE: 82 BPM | RESPIRATION RATE: 18 BRPM | OXYGEN SATURATION: 96 % | DIASTOLIC BLOOD PRESSURE: 83 MMHG | SYSTOLIC BLOOD PRESSURE: 186 MMHG

## 2025-05-28 PROBLEM — D51.3 OTHER DIETARY VITAMIN B12 DEFICIENCY ANEMIA: Status: ACTIVE | Noted: 2025-05-28

## 2025-05-28 PROBLEM — E53.8 VITAMIN B12 DEFICIENCY: Status: ACTIVE | Noted: 2025-05-28

## 2025-05-28 PROBLEM — D51.9 ANEMIA DUE TO VITAMIN B12 DEFICIENCY: Status: ACTIVE | Noted: 2021-01-21

## 2025-05-28 NOTE — ASSESSMENT & PLAN NOTE
Wt Readings from Last 3 Encounters:   05/27/25 58.9 kg (129 lb 12.8 oz)   05/09/25 54 kg (119 lb)   03/12/25 52.3 kg (115 lb 3.2 oz)   Blood pressure currently elevated  Will monitor blood pressure twice daily for one week with printout  Bumex increased to daily dosing for 3 days  Will consider increasing Metoprolol if bp remains elevated.  Follow-up with Cardiology as scheduled.

## 2025-05-28 NOTE — ASSESSMENT & PLAN NOTE
Wt Readings from Last 3 Encounters:   05/27/25 58.9 kg (129 lb 12.8 oz)   05/09/25 54 kg (119 lb)   03/12/25 52.3 kg (115 lb 3.2 oz)   Patient with significant weight gain over the past month.  Increased bilateral lower extremity edema present  Lungs sound clear, no sob reported.  Will schedule Bumex 0.5 mg daily for 3 days with 20 meq of KCL  Hold prn Bumex while receiving scheduled dose for 3 days then resume prn dose.  Repeat blood work ordered for Monday 06/02/25.

## 2025-05-28 NOTE — ASSESSMENT & PLAN NOTE
Hemoglobin drop from 13.7 to 9.4  Will order hemetest stools times three  Start vitamin B-12 500 mcg po daily  Repeat CBC ordered

## 2025-05-28 NOTE — ASSESSMENT & PLAN NOTE
Goal vitamin B-12 level >400  Vitamin B-12 level currently 219  Will start vitamin B-12 500 mcg daily

## 2025-05-30 ENCOUNTER — REMOTE DEVICE CLINIC VISIT (OUTPATIENT)
Dept: CARDIOLOGY CLINIC | Facility: CLINIC | Age: 85
End: 2025-05-30
Payer: MEDICARE

## 2025-05-30 ENCOUNTER — RESULTS FOLLOW-UP (OUTPATIENT)
Dept: CARDIOLOGY CLINIC | Facility: CLINIC | Age: 85
End: 2025-05-30

## 2025-05-30 ENCOUNTER — IN HOME VISIT (OUTPATIENT)
Dept: GERIATRICS | Facility: OTHER | Age: 85
End: 2025-05-30
Payer: MEDICARE

## 2025-05-30 DIAGNOSIS — Z95.0 PRESENCE OF PERMANENT CARDIAC PACEMAKER: ICD-10-CM

## 2025-05-30 DIAGNOSIS — Z95.0 CARDIAC PACEMAKER IN SITU: Primary | ICD-10-CM

## 2025-05-30 DIAGNOSIS — R60.0 BILATERAL LOWER EXTREMITY EDEMA: Primary | ICD-10-CM

## 2025-05-30 PROCEDURE — 99348 HOME/RES VST EST LOW MDM 30: CPT

## 2025-05-30 PROCEDURE — 93296 REM INTERROG EVL PM/IDS: CPT | Performed by: INTERNAL MEDICINE

## 2025-05-30 PROCEDURE — 93294 REM INTERROG EVL PM/LDLS PM: CPT | Performed by: INTERNAL MEDICINE

## 2025-05-30 NOTE — PROGRESS NOTES
MDT-DUAL CHAMBER PPM (DDDR MODE)/ACTIVE SYSTEM IS MRI CONDITIONAL   CARELINK TRANSMISSION: BATTERY VOLTAGE ADEQUATE (5.9 YRS). AP 76.7%  99.7% (>40% /DDDR 70PPM/MVP OFF). Alert: Atrial unipolar lead impedance warning on 23-May-2025. ALL AVAILABLE LEAD PARAMETERS WITHIN NORMAL LIMITS & STABLE ON TRENDING. SINCE 8/24/2024 CLINIC: NO SIGNIFICANT HIGH RATE EPISODES.  111 AT/AF EPISODE  W/LAST DATED 10/31/24. AF BURDEN 0.3%. PT ON XARELTO, AMIODORONE, MEOTPROLOL SUCC. PVC BURDEN 0.2/HR. NORMAL DEVICE FUNCTION.  ES

## 2025-06-02 NOTE — ASSESSMENT & PLAN NOTE
BLE with pitting edema , skin intact, no signs of infection and neurovascular status intact.    Plan  -Mick stockings .  -Feet elevation when sitting.  -Monitor for persistent edema and notify provider

## 2025-06-02 NOTE — ASSESSMENT & PLAN NOTE
Bilateral lower extremity edema persists patient currently was started on Bumex with potassium supplementation.  No respiratory distress noted.  Will recommend cardiology input if B/LE edema persists.    Plan  -Will continue Bumex 0.5 mg po once daily with Potassium 20 meq once daily x 2 days.  -Will check BMP as ordered on 06/02/2025  -Continue melissa stockings.  -Continue to elevate feet to ease edema.  -Continue to weight patient daily.  -Continue to monitor vital signs.

## 2025-06-02 NOTE — PROGRESS NOTES
[address] [address]  [phone]  FACILITY: [facility]  Code : PC-13  ACUTE VISIT    Assessment/Plan:    1. Bilateral lower extremity edema  Assessment & Plan:  BLE with pitting edema , skin intact, no signs of infection and neurovascular status intact.    Plan  -Melissa stockings .  -Feet elevation when sitting.  -Monitor for persistent edema and notify provider         Subjective:      Patient ID: Verito Fallon is a 84 y.o. female.        PAST MEDICAL HISTORY:  Past Medical History[1]  Past Surgical History[2]      Verito Fallon is a 84 year old female patient who is a resident of [facility] who is seen today for concern of bilateral lower extremity edema. Patient has PMH of Hypertension, Afib, hyperlipidemia,depression and dementia. On examination patient is alert, verbal and oriented x 2 . Lungs are clear to auscultation with no respiratory distress noted. Bilateral lower extremities with (+)2 pitting edema , skin intact with pedal pulses present and palpable. Discussed with patient plan of care to order melissa stockings and  elevate feet while sitting to ease edema was agreeable to treatment plan.  Patient's labs and vital signs reviewed on [facility].          Review of Systems   Constitutional:  Negative for activity change and fatigue.   Respiratory:  Negative for cough and chest tightness.    Cardiovascular:  Positive for leg swelling. Negative for chest pain and palpitations.   Skin:  Negative for color change.   Neurological:  Negative for facial asymmetry, weakness and light-headedness.   Psychiatric/Behavioral:  The patient is not nervous/anxious.          Objective:    VITALS: There were no vitals filed for this visit.       Physical Exam    Cardiovascular:      Rate and Rhythm: Normal rate and regular rhythm.   Pulmonary:      Effort: Pulmonary effort is normal. No respiratory distress.      Breath sounds: Normal breath sounds. No wheezing or rales.  "  Abdominal:      General: There is no distension.      Palpations: Abdomen is soft.     Musculoskeletal:         General: No swelling, tenderness, deformity or signs of injury.      Right lower leg: Edema present.      Left lower leg: Edema present.     Skin:     General: Skin is warm and dry.      Capillary Refill: Capillary refill takes less than 2 seconds.     Neurological:      Mental Status: She is alert. Mental status is at baseline.     Psychiatric:         Mood and Affect: Mood normal.           Current Medications[3]      Please note:  Voice-recognition software may have been used in the preparation of this document.  Occasional wrong word or \"sound-alike\" substitutions may have occurred due to the inherent limitations of voice recognition software.  Interpretation should be guided by context.       KATHY Ramos  5/22/2025  1:09 PM           [1]   Past Medical History:  Diagnosis Date    Abnormal liver enzymes 01/21/2021    Acute (reversible) ischemia of small intestine, extent unspecified (HCC) 01/14/2022    Anal fissure     Cardiac disorder     Closed fracture of fifth metacarpal bone of right hand 11/13/2023    Closed nondisplaced fracture of fifth metacarpal bone of right hand, unspecified portion of metacarpal, initial encounter 11/17/2023    Cognitive changes 12/23/2020    Edema of left upper extremity 01/22/2021    Esophageal reflux 12/15/2023    Esophageal reflux 12/15/2023    Esophagitis, reflux     Fall 10/04/2022    Hemorrhoids     Hepatic hemangioma     Last Assessed: 1/13/2015    Herpes zoster     History of colonic polyps     Hypertension     Ischemic colitis (HCC)     Lumbar herniated disc     Malignant neoplasm without specification of site (HCC)     Memory loss 05/03/2021    Multiple contusions 11/12/2023    Nephrolithiasis     L. 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    Urinary retention 01/22/2021    Uterovaginal prolapse     Grade I-II    Wound of right leg 12/22/2022   [2]   Past Surgical History:  Procedure Laterality Date    APPENDECTOMY  1947    CARDIAC PACEMAKER PLACEMENT  01/2021    CARDIAC SURGERY      CABG    CATARACT EXTRACTION Bilateral     COLONOSCOPY  2012    Fiberoptic    COLONOSCOPY      Resolved: 2006 - 2012 5 year f/u    CORONARY ANGIOPLASTY WITH STENT PLACEMENT      CORONARY ARTERY BYPASS GRAFT      Resolved: 2012    ESOPHAGOGASTRODUODENOSCOPY  2012    Diagnostic    HEMORROIDECTOMY      KNEE SURGERY      LITHOTRIPSY      Renal    MALIGNANT SKIN LESION EXCISION      Face; Resolved: 2004    NE ESOPHAGOGASTRODUODENOSCOPY TRANSORAL DIAGNOSTIC N/A 4/13/2016    Procedure: EGD AND COLONOSCOPY;  Surgeon: Evelin Bone MD;  [facility];  Service: Gastroenterology    RENAL ARTERY STENT      SKIN LESION EXCISION      Scalp    SOFT TISSUE TUMOR RESECTION      Shoulder; Resolved: 1995    SPLIT THICKNESS SKIN GRAFT Right 12/14/2023    Procedure: SKIN GRAFT SPLIT THICKNESS (STSG)  EXTREMITY; VAC application;  Surgeon: Ap Brito DO;  [facility];  Service: General    THROMBOLYSIS      Postoperative Thrombolysis PTCA    TONSILLECTOMY      Resolved: 1944    WOUND DEBRIDEMENT Right 12/8/2023    Procedure: DEBRIDEMENT LOWER EXTREMITY (WASH OUT); POSSIBLE VAC APPLICATION;  Surgeon: Abhijeet Davies MD;  [facility];  Service: General   [3]   Current Outpatient Medications:     acetaminophen (TYLENOL) 325 mg tablet, Take 2 tablets (650 mg total) by mouth every 4 (four) hours as needed for mild pain, Disp: , Rfl: 0    amiodarone 200 mg tablet, Take 0.5 tablets (100 mg total) by mouth daily, Disp: 45 tablet, Rfl: 3    atorvastatin (LIPITOR) 20 mg tablet,  Take 1 tablet (20 mg total) by mouth daily with dinner, Disp: 90 tablet, Rfl: 4    bumetanide (BUMEX) 0.5 MG tablet, Take 1 tablet (0.5 mg total) by mouth daily as needed (Lower extremity edema, 2lb weight gain in 24 hours or 5 lbs in 1 week) for up to 30 doses, Disp: 30 tablet, Rfl: 0    escitalopram (LEXAPRO) 10 mg tablet, Take 1 tablet (10 mg total) by mouth daily, Disp: 90 tablet, Rfl: 1    metoprolol succinate (TOPROL-XL) 25 mg 24 hr tablet, Take 0.5 tablets (12.5 mg total) by mouth daily, Disp: 15 tablet, Rfl: 0    nitroglycerin (NITROSTAT) 0.4 mg SL tablet, Place 1 tablet (0.4 mg total) under the tongue every 5 (five) minutes as needed for chest pain, Disp: 25 tablet, Rfl: 0    polyethylene glycol (MIRALAX) 17 g packet, Take 17 g by mouth daily for 7 days, Disp: 119 g, Rfl: 0    rivaroxaban (Xarelto) 15 mg tablet, Take 1 tablet (15 mg total) by mouth daily with breakfast, Disp: 90 tablet, Rfl: 3

## 2025-06-02 NOTE — PROGRESS NOTES
[facility]  [address] [place] [id number]  [phone]  FACILITY: [facility]  Code -13  ACUTE VISIT    Assessment/Plan:    1. Bilateral lower extremity edema  Assessment & Plan:  Bilateral lower extremity edema persists patient currently was started on Bumex with potassium supplementation.  No respiratory distress noted.  Will recommend cardiology input if B/LE edema persists.    Plan  -Will continue Bumex 0.5 mg po once daily with Potassium 20 meq once daily x 2 days.  -Will check BMP as ordered on 06/02/2025  -Continue melissa stockings.  -Continue to elevate feet to ease edema.  -Continue to weight patient daily.  -Continue to monitor vital signs.  2. Presence of permanent cardiac pacemaker  Assessment & Plan:  Per cardiology EP review report  on 05/29/2025 pacemaker with normal function .    Plan  Continue Metoprolol for rate control.  Continue Xarelto for anticoagulation  Continue Amiodarone for antiarrthymic         Subjective:      Patient ID: Verito Fallon is a 84 y.o. female.      PAST MEDICAL HISTORY:  Past Medical History[1]  Past Surgical History[2]      Verito Fallon is a 84 year old resident of [facility]. She is seen today because of nursing concern that patient continues to have edema of bilateral lower extremities despite being on Bumex 0.5 mg po x days with potassium supplementation  of 20 meq. On assessment patient is alert, verbal with confusion but easily directable. She denies being dyspneic on exertion,cough ,chest pain or congestion. Verbalizes some relief on tightness of her bilateral extremities. Lungs are clear to auscultation, Patient with no signs and symptoms of respiratory distress. Neurovascular status to bilateral feet intact.  Discussed plan of care with both patient and charge nurse to continue bumex and potassium for 2 more days and then will recheck labs as scheduled on 06/02/2025 .  Patient;s labs, medication,  nurses notes reviewed  in EMR on  "[facility].          Review of Systems   Constitutional:  Negative for activity change and fatigue.   Respiratory:  Negative for shortness of breath and wheezing.    Cardiovascular:  Positive for leg swelling. Negative for chest pain and palpitations.   Gastrointestinal:  Negative for nausea and vomiting.   Musculoskeletal:  Negative for gait problem.   Skin:  Negative for color change.   Neurological:  Negative for dizziness, light-headedness and numbness.   Psychiatric/Behavioral:  The patient is not nervous/anxious.          Objective:    VITALS: There were no vitals filed for this visit.       Physical Exam  HENT:      Head: Normocephalic.     Cardiovascular:      Rate and Rhythm: Normal rate and regular rhythm.      Pulses: Normal pulses.      Heart sounds: Normal heart sounds.   Pulmonary:      Effort: Pulmonary effort is normal. No respiratory distress.      Breath sounds: Normal breath sounds. No wheezing or rales.   Abdominal:      General: There is no distension.      Palpations: Abdomen is soft.     Musculoskeletal:         General: No swelling, tenderness, deformity or signs of injury.      Right lower leg: Edema present.      Left lower leg: Edema present.     Skin:     Capillary Refill: Capillary refill takes less than 2 seconds.     Neurological:      Mental Status: She is alert. Mental status is at baseline.      Gait: Gait normal.     Psychiatric:         Mood and Affect: Mood normal.           Current Medications[3]      Please note:  Voice-recognition software may have been used in the preparation of this document.  Occasional wrong word or \"sound-alike\" substitutions may have occurred due to the inherent limitations of voice recognition software.  Interpretation should be guided by context.       KATHY Ramos  05/30/2025  11:46 AM           [1]   Past Medical History:  Diagnosis Date    Abnormal liver enzymes 01/21/2021    Acute (reversible) ischemia of small intestine, extent unspecified (HCC) " 01/14/2022    Anal fissure     Cardiac disorder     Closed fracture of fifth metacarpal bone of right hand 11/13/2023    Closed nondisplaced fracture of fifth metacarpal bone of right hand, unspecified portion of metacarpal, initial encounter 11/17/2023    Cognitive changes 12/23/2020    Edema of left upper extremity 01/22/2021    Esophageal reflux 12/15/2023    Esophageal reflux 12/15/2023    Esophagitis, reflux     Fall 10/04/2022    Hemorrhoids     Hepatic hemangioma     Last Assessed: 1/13/2015    Herpes zoster     History of colonic polyps     Hypertension     Ischemic colitis (HCC)     Lumbar herniated disc     Malignant neoplasm without specification of site ([facility])     Memory loss 05/03/2021    Multiple contusions 11/12/2023    Nephrolithiasis     L. 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    Urinary retention 01/22/2021    Uterovaginal prolapse     Grade I-II    Wound of right leg 12/22/2022   [2]   Past Surgical History:  Procedure Laterality Date    APPENDECTOMY  1947    CARDIAC PACEMAKER PLACEMENT  01/2021    CARDIAC SURGERY      CABG    CATARACT EXTRACTION Bilateral     COLONOSCOPY  2012    Fiberoptic    COLONOSCOPY      Resolved: 2006 - 2012 5 year f/u    CORONARY ANGIOPLASTY WITH STENT PLACEMENT      CORONARY ARTERY BYPASS GRAFT      Resolved: 2012    ESOPHAGOGASTRODUODENOSCOPY  2012    Diagnostic    HEMORROIDECTOMY      KNEE SURGERY      LITHOTRIPSY      Renal    MALIGNANT SKIN LESION EXCISION      Face; Resolved: 2004    AL ESOPHAGOGASTRODUODENOSCOPY TRANSORAL DIAGNOSTIC N/A 4/13/2016    Procedure: EGD AND COLONOSCOPY;  Surgeon: Evelin Bone MD;  [facility];  Service: Gastroenterology     RENAL ARTERY STENT      SKIN LESION EXCISION      Scalp    SOFT TISSUE TUMOR RESECTION      Shoulder; Resolved: 1995    SPLIT THICKNESS SKIN GRAFT Right 12/14/2023    Procedure: SKIN GRAFT SPLIT THICKNESS (STSG)  EXTREMITY; VAC application;  Surgeon: Ap Brito DO;  [facility];  Service: General    THROMBOLYSIS      Postoperative Thrombolysis PTCA    TONSILLECTOMY      Resolved: 1944    WOUND DEBRIDEMENT Right 12/8/2023    Procedure: DEBRIDEMENT LOWER EXTREMITY (WASH OUT); POSSIBLE VAC APPLICATION;  Surgeon: Abhijeet Davies MD;  [facility];  Service: General   [3]   Current Outpatient Medications:     acetaminophen (TYLENOL) 325 mg tablet, Take 2 tablets (650 mg total) by mouth every 4 (four) hours as needed for mild pain, Disp: , Rfl: 0    amiodarone 200 mg tablet, Take 0.5 tablets (100 mg total) by mouth daily, Disp: 45 tablet, Rfl: 3    atorvastatin (LIPITOR) 20 mg tablet, Take 1 tablet (20 mg total) by mouth daily with dinner, Disp: 90 tablet, Rfl: 4    bumetanide (BUMEX) 0.5 MG tablet, Take 1 tablet (0.5 mg total) by mouth daily as needed (Lower extremity edema, 2lb weight gain in 24 hours or 5 lbs in 1 week) for up to 30 doses, Disp: 30 tablet, Rfl: 0    escitalopram (LEXAPRO) 10 mg tablet, Take 1 tablet (10 mg total) by mouth daily, Disp: 90 tablet, Rfl: 1    metoprolol succinate (TOPROL-XL) 25 mg 24 hr tablet, Take 0.5 tablets (12.5 mg total) by mouth daily, Disp: 15 tablet, Rfl: 0    nitroglycerin (NITROSTAT) 0.4 mg SL tablet, Place 1 tablet (0.4 mg total) under the tongue every 5 (five) minutes as needed for chest pain, Disp: 25 tablet, Rfl: 0    polyethylene glycol (MIRALAX) 17 g packet, Take 17 g by mouth daily for 7 days, Disp: 119 g, Rfl: 0    rivaroxaban (Xarelto) 15 mg tablet, Take 1 tablet (15 mg total) by mouth daily with breakfast, Disp: 90 tablet, Rfl: 3

## 2025-06-02 NOTE — ASSESSMENT & PLAN NOTE
Per cardiology EP review report  on 05/29/2025 pacemaker with normal function .    Plan  Continue Metoprolol for rate control.  Continue Xarelto for anticoagulation  Continue Amiodarone for antiarrthymic

## 2025-06-04 ENCOUNTER — IN HOME VISIT (OUTPATIENT)
Dept: GERIATRICS | Facility: OTHER | Age: 85
End: 2025-06-04
Payer: MEDICARE

## 2025-06-04 DIAGNOSIS — D50.9 IRON DEFICIENCY ANEMIA, UNSPECIFIED IRON DEFICIENCY ANEMIA TYPE: ICD-10-CM

## 2025-06-04 DIAGNOSIS — I50.42 CHRONIC COMBINED SYSTOLIC AND DIASTOLIC CONGESTIVE HEART FAILURE (HCC): Primary | ICD-10-CM

## 2025-06-04 PROCEDURE — 99349 HOME/RES VST EST MOD MDM 40: CPT | Performed by: NURSE PRACTITIONER

## 2025-06-05 ENCOUNTER — TELEPHONE (OUTPATIENT)
Age: 85
End: 2025-06-05

## 2025-06-05 ENCOUNTER — APPOINTMENT (EMERGENCY)
Dept: RADIOLOGY | Facility: HOSPITAL | Age: 85
End: 2025-06-05
Payer: MEDICARE

## 2025-06-05 ENCOUNTER — HOSPITAL ENCOUNTER (INPATIENT)
Facility: HOSPITAL | Age: 85
LOS: 4 days | Discharge: NURSING, REHAB OR LONG-TERM CARE | End: 2025-06-09
Attending: STUDENT IN AN ORGANIZED HEALTH CARE EDUCATION/TRAINING PROGRAM | Admitting: INTERNAL MEDICINE
Payer: MEDICARE

## 2025-06-05 DIAGNOSIS — I50.9 CHF EXACERBATION (HCC): Primary | ICD-10-CM

## 2025-06-05 DIAGNOSIS — R26.2 AMBULATORY DYSFUNCTION: ICD-10-CM

## 2025-06-05 DIAGNOSIS — D64.9 ANEMIA: ICD-10-CM

## 2025-06-05 DIAGNOSIS — I50.9 CHF (CONGESTIVE HEART FAILURE) (HCC): ICD-10-CM

## 2025-06-05 DIAGNOSIS — D50.9 IRON DEFICIENCY ANEMIA, UNSPECIFIED IRON DEFICIENCY ANEMIA TYPE: ICD-10-CM

## 2025-06-05 PROBLEM — I50.43 ACUTE ON CHRONIC COMBINED SYSTOLIC AND DIASTOLIC CHF (CONGESTIVE HEART FAILURE) (HCC): Status: ACTIVE | Noted: 2021-01-08

## 2025-06-05 LAB
2HR DELTA HS TROPONIN: 0 NG/L
4HR DELTA HS TROPONIN: 1 NG/L
ALBUMIN SERPL BCG-MCNC: 3.7 G/DL (ref 3.5–5)
ALP SERPL-CCNC: 93 U/L (ref 34–104)
ALT SERPL W P-5'-P-CCNC: 31 U/L (ref 7–52)
ANION GAP SERPL CALCULATED.3IONS-SCNC: 7 MMOL/L (ref 4–13)
AST SERPL W P-5'-P-CCNC: 22 U/L (ref 13–39)
BASOPHILS # BLD AUTO: 0.05 THOUSANDS/ÂΜL (ref 0–0.1)
BASOPHILS NFR BLD AUTO: 1 % (ref 0–1)
BILIRUB SERPL-MCNC: 0.91 MG/DL (ref 0.2–1)
BNP SERPL-MCNC: 716 PG/ML (ref 0–100)
BUN SERPL-MCNC: 20 MG/DL (ref 5–25)
CALCIUM SERPL-MCNC: 8.7 MG/DL (ref 8.4–10.2)
CARDIAC TROPONIN I PNL SERPL HS: 22 NG/L (ref ?–50)
CARDIAC TROPONIN I PNL SERPL HS: 22 NG/L (ref ?–50)
CARDIAC TROPONIN I PNL SERPL HS: 23 NG/L (ref ?–50)
CHLORIDE SERPL-SCNC: 106 MMOL/L (ref 96–108)
CO2 SERPL-SCNC: 26 MMOL/L (ref 21–32)
CREAT SERPL-MCNC: 1.13 MG/DL (ref 0.6–1.3)
EOSINOPHIL # BLD AUTO: 0.35 THOUSAND/ÂΜL (ref 0–0.61)
EOSINOPHIL NFR BLD AUTO: 6 % (ref 0–6)
ERYTHROCYTE [DISTWIDTH] IN BLOOD BY AUTOMATED COUNT: 16.3 % (ref 11.6–15.1)
GFR SERPL CREATININE-BSD FRML MDRD: 44 ML/MIN/1.73SQ M
GLUCOSE SERPL-MCNC: 95 MG/DL (ref 65–140)
HCT VFR BLD AUTO: 33.6 % (ref 34.8–46.1)
HGB BLD-MCNC: 10.3 G/DL (ref 11.5–15.4)
IMM GRANULOCYTES # BLD AUTO: 0.04 THOUSAND/UL (ref 0–0.2)
IMM GRANULOCYTES NFR BLD AUTO: 1 % (ref 0–2)
LYMPHOCYTES # BLD AUTO: 0.65 THOUSANDS/ÂΜL (ref 0.6–4.47)
LYMPHOCYTES NFR BLD AUTO: 10 % (ref 14–44)
MCH RBC QN AUTO: 27.2 PG (ref 26.8–34.3)
MCHC RBC AUTO-ENTMCNC: 30.7 G/DL (ref 31.4–37.4)
MCV RBC AUTO: 89 FL (ref 82–98)
MONOCYTES # BLD AUTO: 0.8 THOUSAND/ÂΜL (ref 0.17–1.22)
MONOCYTES NFR BLD AUTO: 13 % (ref 4–12)
NEUTROPHILS # BLD AUTO: 4.35 THOUSANDS/ÂΜL (ref 1.85–7.62)
NEUTS SEG NFR BLD AUTO: 69 % (ref 43–75)
NRBC BLD AUTO-RTO: 0 /100 WBCS
PLATELET # BLD AUTO: 253 THOUSANDS/UL (ref 149–390)
PMV BLD AUTO: 10.3 FL (ref 8.9–12.7)
POTASSIUM SERPL-SCNC: 3.9 MMOL/L (ref 3.5–5.3)
PROT SERPL-MCNC: 7 G/DL (ref 6.4–8.4)
RBC # BLD AUTO: 3.78 MILLION/UL (ref 3.81–5.12)
SODIUM SERPL-SCNC: 139 MMOL/L (ref 135–147)
WBC # BLD AUTO: 6.24 THOUSAND/UL (ref 4.31–10.16)

## 2025-06-05 PROCEDURE — 84484 ASSAY OF TROPONIN QUANT: CPT

## 2025-06-05 PROCEDURE — 99285 EMERGENCY DEPT VISIT HI MDM: CPT

## 2025-06-05 PROCEDURE — 93005 ELECTROCARDIOGRAM TRACING: CPT

## 2025-06-05 PROCEDURE — 85025 COMPLETE CBC W/AUTO DIFF WBC: CPT

## 2025-06-05 PROCEDURE — 80053 COMPREHEN METABOLIC PANEL: CPT

## 2025-06-05 PROCEDURE — 99285 EMERGENCY DEPT VISIT HI MDM: CPT | Performed by: STUDENT IN AN ORGANIZED HEALTH CARE EDUCATION/TRAINING PROGRAM

## 2025-06-05 PROCEDURE — 99223 1ST HOSP IP/OBS HIGH 75: CPT | Performed by: HOSPITALIST

## 2025-06-05 PROCEDURE — 36415 COLL VENOUS BLD VENIPUNCTURE: CPT

## 2025-06-05 PROCEDURE — 71045 X-RAY EXAM CHEST 1 VIEW: CPT

## 2025-06-05 PROCEDURE — 83880 ASSAY OF NATRIURETIC PEPTIDE: CPT

## 2025-06-05 PROCEDURE — 84484 ASSAY OF TROPONIN QUANT: CPT | Performed by: STUDENT IN AN ORGANIZED HEALTH CARE EDUCATION/TRAINING PROGRAM

## 2025-06-05 RX ORDER — ATORVASTATIN CALCIUM 20 MG/1
20 TABLET, FILM COATED ORAL
Status: DISCONTINUED | OUTPATIENT
Start: 2025-06-06 | End: 2025-06-09 | Stop reason: HOSPADM

## 2025-06-05 RX ORDER — FERROUS SULFATE 325(65) MG
325 TABLET, DELAYED RELEASE (ENTERIC COATED) ORAL DAILY
Status: ON HOLD | COMMUNITY
End: 2025-06-09

## 2025-06-05 RX ORDER — ACETAMINOPHEN 325 MG/1
650 TABLET ORAL EVERY 6 HOURS PRN
Status: DISCONTINUED | OUTPATIENT
Start: 2025-06-05 | End: 2025-06-09 | Stop reason: HOSPADM

## 2025-06-05 RX ORDER — AMIODARONE HYDROCHLORIDE 100 MG/1
100 TABLET ORAL DAILY
Status: DISCONTINUED | OUTPATIENT
Start: 2025-06-06 | End: 2025-06-09 | Stop reason: HOSPADM

## 2025-06-05 RX ORDER — POLYETHYLENE GLYCOL 3350 17 G/17G
17 POWDER, FOR SOLUTION ORAL DAILY PRN
Status: DISCONTINUED | OUTPATIENT
Start: 2025-06-05 | End: 2025-06-09 | Stop reason: HOSPADM

## 2025-06-05 RX ORDER — FUROSEMIDE 10 MG/ML
20 INJECTION INTRAMUSCULAR; INTRAVENOUS
Status: DISCONTINUED | OUTPATIENT
Start: 2025-06-06 | End: 2025-06-09

## 2025-06-05 RX ORDER — MULTIVITAMIN WITH IRON
500 TABLET ORAL DAILY
COMMUNITY

## 2025-06-05 RX ORDER — POTASSIUM CHLORIDE 750 MG/1
20 CAPSULE, EXTENDED RELEASE ORAL DAILY
COMMUNITY

## 2025-06-05 RX ORDER — FUROSEMIDE 10 MG/ML
40 INJECTION INTRAMUSCULAR; INTRAVENOUS ONCE
Status: COMPLETED | OUTPATIENT
Start: 2025-06-05 | End: 2025-06-05

## 2025-06-05 RX ORDER — METOPROLOL SUCCINATE 25 MG/1
12.5 TABLET, EXTENDED RELEASE ORAL DAILY
Status: DISCONTINUED | OUTPATIENT
Start: 2025-06-06 | End: 2025-06-09 | Stop reason: HOSPADM

## 2025-06-05 RX ORDER — POTASSIUM CHLORIDE 1500 MG/1
20 TABLET, EXTENDED RELEASE ORAL DAILY
Status: DISCONTINUED | OUTPATIENT
Start: 2025-06-06 | End: 2025-06-09 | Stop reason: HOSPADM

## 2025-06-05 RX ORDER — BUMETANIDE 1 MG/1
0.5 TABLET ORAL DAILY PRN
Status: DISCONTINUED | OUTPATIENT
Start: 2025-06-05 | End: 2025-06-09

## 2025-06-05 RX ORDER — FERROUS SULFATE 325(65) MG
325 TABLET ORAL
Status: DISCONTINUED | OUTPATIENT
Start: 2025-06-06 | End: 2025-06-08

## 2025-06-05 RX ADMIN — FUROSEMIDE 40 MG: 10 INJECTION, SOLUTION INTRAVENOUS at 18:27

## 2025-06-05 RX ADMIN — Medication 6 MG: at 23:11

## 2025-06-05 NOTE — H&P
H&P - Hospitalist   Name: Verito Fallon 84 y.o. female I MRN: [id number]  Unit/Bed#: ED-09 I Date of Admission: 6/5/2025   Date of Service: 6/5/2025 I Hospital Day: 0     Assessment & Plan  Acute on chronic combined systolic and diastolic CHF (congestive heart failure) ([facility])  Wt Readings from Last 3 Encounters:   06/05/25 62 kg (136 lb 11 oz)   05/27/25 58.9 kg (129 lb 12.8 oz)   05/09/25 54 kg (119 lb)     Patient w/ hx CHF presents from SNF due to 17 pound weight gain over the past month and shortness of breath.   1/2021 echo EF 50%, severe hypokinesis in the distal septum, grade 2 DD, moderate MR  Home regimen: Bumex 0.5 mg daily, metoprolol 12.5 mg daily    Initial Trop 22  CXR: Upon my read patient has pulmonary edema L>R  Status post Lasix 40 mg IV in the ED    Plan   Hold home Bumex  Lasix 20 mg twice daily IV  Daily standing weights  Strict I's and O's  Fluid restrict 1.8 L, 2 g NA  Follow-up tropes  A.m. BMP and CBC  A-fib ([facility])  ECG upon arrival paced and rate controlled at 84  Home regimen amiodarone 100 mg daily, metoprolol 12.5 mg daily, Xarelto 15 mg daily  Continue home meds  CAD (coronary artery disease)  CAD status post CABG x 4 (2011) and LAMINE (2021)  Home regimen: Lipitor 20 mg qd, metoprolol 12.5 mg daily.  Not on antiplatelets antiplatelets  Continue home medications  Chronic kidney disease, stage 3b ([facility])  Lab Results   Component Value Date    EGFR 44 06/05/2025    EGFR 73 05/19/2025    EGFR 62 02/19/2025    CREATININE 1.13 06/05/2025    CREATININE 0.79 05/19/2025    CREATININE 0.85 02/19/2025     Baseline 0.8-1.0  Creatinine at baseline  Continue to monitor with BMP as needed  Avoid nephrotoxins  Hyperlipidemia  Lab Results   Component Value Date    CHOLESTEROL 168 12/04/2020    TRIG 88 12/04/2020    HDL 62 12/04/2020    LDLCALC 88 12/04/2020     Home regimen: Lipitor 20 mg qd, continue      VTE Pharmacologic Prophylaxis: VTE Score: 5 High Risk (Score >/= 5) - Pharmacological DVT  "Prophylaxis Ordered: rivaroxaban (Xarelto). Sequential Compression Devices Ordered.  Code Status: Level 3 - DNAR and DNI   Discussion with family: Attempted to update  (daughter) via phone. Left voicemail.   Called and spoke with son Duane Bortz and updated him.     Anticipated Length of Stay: Patient will be admitted on an inpatient basis with an anticipated length of stay of greater than 2 midnights secondary to CHF exacerbation requiring diuretics.    History of Present Illness   Chief Complaint: \"worn out\"    Verito Fallon is a 84 y.o. female with a PMHx CAD s/p CABG x4 and LAMINE, PVD, CHF, CKD III,A-fib, HTN, kidney stones, dementia, tachybradycardia syndrome s/p MDT-dual chamber PPM who presents from [facility] due to complaints of shortness of breath and recent weight gain of approximately 17 pounds within less than a month.    Upon arrival to the ED patient is afebrile and hemodynamically stable.  Workup notable for anemia hemoglobin of 10 (baseline 11-13), CMP WNL creatinine 1.13 (baseline 0.8-1.0), , initial Trop 22.  Chest ray notable for pulmonary edema worse in the left lower base.  Patient given 40 mg of Lasix in the ED.    1/2021 echo EF 50%, severe hypokinesis in the distal septum, grade 2 DD, moderate MR    Saw geriatrics 5/27/25 who recommended Bumex 0.5 mg x 3days with potassium, NORY stockings. Wt 129 lbs increased from visiti on 5/9 were she weighed 119 lbs.    Saw Dr. Fisher cardiology 8/2024     Review of Systems   Unable to perform ROS: Dementia       Historical Information   Past Medical History[1]  Past Surgical History[2]  Social History[3]  E-Cigarette/Vaping    E-Cigarette Use Never User      E-Cigarette/Vaping Substances    Nicotine No     THC No     CBD No     Flavoring No     Other No     Unknown No        Social History:  Marital Status: /Civil Union   Occupation:   Patient Pre-hospital Living Situation: Skilled Nursing Facility: [facility] [address]  Patient Pre-hospital Level of Mobility: walks with cane  Patient Pre-hospital Diet Restrictions: Regular diet with thin liquids    Meds/Allergies   I have reveiwed home medications using records provided by SNF.  Prior to Admission medications    Medication Sig Start Date End Date Taking? Authorizing Provider   acetaminophen (TYLENOL) 325 mg tablet Take 2 tablets (650 mg total) by mouth every 4 (four) hours as needed for mild pain 11/13/23   Be Banks PA-C   amiodarone 200 mg tablet Take 0.5 tablets (100 mg total) by mouth daily 8/16/24   Abhijeet Fisher MD   atorvastatin (LIPITOR) 20 mg tablet Take 1 tablet (20 mg total) by mouth daily with dinner 8/16/24   Abhijeet Fisher MD   bumetanide (BUMEX) 0.5 MG tablet Take 1 tablet (0.5 mg total) by mouth daily as needed (Lower extremity edema, 2lb weight gain in 24 hours or 5 lbs in 1 week) for up to 30 doses 2/21/25   Mark Camarillo DO   escitalopram (LEXAPRO) 10 mg tablet Take 1 tablet (10 mg total) by mouth daily 3/12/25 9/8/25  Robbie Warner DO   metoprolol succinate (TOPROL-XL) 25 mg 24 hr tablet Take 0.5 tablets (12.5 mg total) by mouth daily 2/22/25 5/9/25  Mark Camarillo DO   nitroglycerin (NITROSTAT) 0.4 mg SL tablet Place 1 tablet (0.4 mg total) under the tongue every 5 (five) minutes as needed for chest pain 2/23/21   Radha Hinojosa MD   polyethylene glycol (MIRALAX) 17 g packet Take 17 g by mouth daily for 7 days 7/5/24 5/9/25  Marissa Brannon MD   rivaroxaban (Xarelto) 15 mg tablet Take 1 tablet (15 mg total) by mouth daily with breakfast 8/16/24   Abhijeet Fisher MD     Allergies   Allergen Reactions    Sulfa Antibiotics Anaphylaxis    Formaldehyde     Codeine Palpitations       Objective :  Temp:  [97.4 °F (36.3 °C)] 97.4 °F (36.3 °C)  HR:  [71-90] 71  BP: (111-126)/(60-73) 126/60  Resp:  [16] 16  SpO2:  [95 %-98 %] 95 %  O2 Device: None (Room air)    Physical Exam  Vitals and nursing note reviewed.   Constitutional:        Appearance: Normal appearance.   HENT:      Head: Normocephalic and atraumatic.      Mouth/Throat:      Mouth: Mucous membranes are moist.     Eyes:      Extraocular Movements: Extraocular movements intact.      Conjunctiva/sclera: Conjunctivae normal.      Pupils: Pupils are equal, round, and reactive to light.       Cardiovascular:      Rate and Rhythm: Normal rate and regular rhythm.      Comments: Pacemaker noted to left chest wall  Pulmonary:      Effort: Pulmonary effort is normal. No respiratory distress.      Breath sounds: Rales present. No wheezing.   Abdominal:      General: Bowel sounds are normal. There is no distension.      Palpations: Abdomen is soft.      Tenderness: There is no abdominal tenderness. There is no guarding.     Musculoskeletal:      Right lower leg: Edema present.      Left lower leg: Edema present.      Comments: 3+ pitting edema up to the knee     Skin:     General: Skin is warm and dry.     Neurological:      General: No focal deficit present.      Mental Status: She is alert.      Comments: Patient pleasantly confused oriented to self and place.   Psychiatric:         Mood and Affect: Mood normal.          Lines/Drains:            Lab Results: I have reviewed the following results:  Results from last 7 days   Lab Units 06/05/25  1652   WBC Thousand/uL 6.24   HEMOGLOBIN g/dL 10.3*   HEMATOCRIT % 33.6*   PLATELETS Thousands/uL 253   SEGS PCT % 69   LYMPHO PCT % 10*   MONO PCT % 13*   EOS PCT % 6     Results from last 7 days   Lab Units 06/05/25  1652   SODIUM mmol/L 139   POTASSIUM mmol/L 3.9   CHLORIDE mmol/L 106   CO2 mmol/L 26   BUN mg/dL 20   CREATININE mg/dL 1.13   ANION GAP mmol/L 7   CALCIUM mg/dL 8.7   ALBUMIN g/dL 3.7   TOTAL BILIRUBIN mg/dL 0.91   ALK PHOS U/L 93   ALT U/L 31   AST U/L 22   GLUCOSE RANDOM mg/dL 95             Lab Results   Component Value Date    HGBA1C 5.5 02/05/2019           Imaging Results Review: I personally reviewed the following image studies in  PACS and associated radiology reports: chest xray. My interpretation of the radiology images/reports is: Pulmonary edema and vascular congestion.  Other Study Results Review: EKG was reviewed.     Administrative Statements       ** Please Note: This note has been constructed using a voice recognition system. **         [1]   Past Medical History:  Diagnosis Date    Abnormal liver enzymes 01/21/2021    Acute (reversible) ischemia of small intestine, extent unspecified (HCC) 01/14/2022    Anal fissure     Cardiac disorder     Closed fracture of fifth metacarpal bone of right hand 11/13/2023    Closed nondisplaced fracture of fifth metacarpal bone of right hand, unspecified portion of metacarpal, initial encounter 11/17/2023    Cognitive changes 12/23/2020    Edema of left upper extremity 01/22/2021    Esophageal reflux 12/15/2023    Esophageal reflux 12/15/2023    Esophagitis, reflux     Fall 10/04/2022    Hemorrhoids     Hepatic hemangioma     Last Assessed: 1/13/2015    Herpes zoster     History of colonic polyps     Hypertension     Ischemic colitis ([facility])     Lumbar herniated disc     Malignant neoplasm without specification of site ([facility])     Memory loss 05/03/2021    Multiple contusions 11/12/2023    Nephrolithiasis     L. 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    Urinary retention 01/22/2021    Uterovaginal prolapse     Grade I-II    Wound of right leg 12/22/2022   [2]   Past Surgical History:  Procedure Laterality Date    APPENDECTOMY  1947    CARDIAC PACEMAKER PLACEMENT  01/2021    CARDIAC SURGERY      CABG    CATARACT EXTRACTION Bilateral     COLONOSCOPY  2012    Fiberoptic    COLONOSCOPY      Resolved: 2006 -  2012 5 year f/u    CORONARY ANGIOPLASTY WITH STENT PLACEMENT      CORONARY ARTERY BYPASS GRAFT      Resolved: 2012    ESOPHAGOGASTRODUODENOSCOPY  2012    Diagnostic    HEMORROIDECTOMY      KNEE SURGERY      LITHOTRIPSY      Renal    MALIGNANT SKIN LESION EXCISION      Face; Resolved: 2004    VA ESOPHAGOGASTRODUODENOSCOPY TRANSORAL DIAGNOSTIC N/A 4/13/2016    Procedure: EGD AND COLONOSCOPY;  Surgeon: Evelin Bone MD;  Location: [facility];  Service: Gastroenterology    RENAL ARTERY STENT      SKIN LESION EXCISION      Scalp    SOFT TISSUE TUMOR RESECTION      Shoulder; Resolved: 1995    SPLIT THICKNESS SKIN GRAFT Right 12/14/2023    Procedure: SKIN GRAFT SPLIT THICKNESS ([facility])  EXTREMITY; VAC application;  Surgeon: Ap Brito DO;  Location: [facility];  Service: General    THROMBOLYSIS      Postoperative Thrombolysis PTCA    TONSILLECTOMY      Resolved: 1944    WOUND DEBRIDEMENT Right 12/8/2023    Procedure: DEBRIDEMENT LOWER EXTREMITY (WASH OUT); POSSIBLE VAC APPLICATION;  Surgeon: Abhijeet Davies MD;  Location: [facility];  Service: General   [3]   Social History  Tobacco Use    Smoking status: Never    Smokeless tobacco: Never   Vaping Use    Vaping status: Never Used   Substance and Sexual Activity    Alcohol use: Yes     Alcohol/week: 1.0 standard drink of alcohol     Types: 1 Glasses of wine per week     Comment: occasionally, but no alcohol intake recently    Drug use: Never    Sexual activity: Not Currently

## 2025-06-05 NOTE — ED ATTENDING ATTESTATION
I, Pippa Hamilton MD, saw and evaluated the patient. I have discussed the patient with the resident/non-physician practitioner and agree with the resident's/non-physician practitioner's findings, Plan of Care, and MDM as documented in the resident's/non-physician practitioner's note, except where noted. All available labs and Radiology studies were reviewed.  I was present for key portions of any procedure(s) performed by the resident/non-physician practitioner and I was immediately available to provide assistance.       At this point I agree with the current assessment done in the Emergency Department.  I have conducted an independent evaluation of this patient a history and physical is as follows:    Subjective: 84-year-old female with history of CHF, dementia, A-fib on AC, hypertension, and CKD who presents from assisted living facility with shortness of breath and recent unintentional weight gain.  EMS reported approximately 20 pound weight gain in 1 month with 5 pound weight gain since yesterday with progressive lower extremity swelling.  Daughter provides additional history and states that patient has had increased shortness of breath over the last 1.5 weeks.  Patient denies any complaints at this time.    Objective: Vital signs stable and afebrile.  No murmurs/rubs/gallops on cardiac auscultation and 2+ radial pulse bilaterally.  Lungs clear to auscultation.  2+ lower extremity edema bilaterally.  Negative Homans' sign bilaterally.    Assessment/Plan: Elderly female with history of CHF status post pacemaker, A-fib on AC, dementia, hypertension, CKD presenting with progressive leg swelling and shortness of breath in the setting of unintentional weight gain.  Vitals are stable and exam is consistent with hypervolemia.  Patient is in no acute distress on room air and is not hypoxic at this time.  Presentation most consistent with CHF.  Doubt PE, ACS.  Labs consistent with CHF exacerbation.  EKG without acute signs  of ischemia.  Chest x-ray with significant cardiomegaly and left-sided pulmonary opacities with pleural effusion.    Patient provided with diuretics and admitted to the hospital.

## 2025-06-05 NOTE — ED PROVIDER NOTES
Time reflects when diagnosis was documented in both MDM as applicable and the Disposition within this note       Time User Action Codes Description Comment    6/5/2025  5:58 PM Zia Chavez [I50.9] CHF exacerbation (HCC)     6/5/2025  5:58 PM Scott Zia Clinton [D64.9] Anemia           ED Disposition       ED Disposition   Admit    Condition   Stable    Date/Time   Thu Jun 5, 2025  5:57 PM    Comment                  Assessment & Plan       Medical Decision Making  84-year-old female history of CHF, dementia sent in from [facility] due to concerns of shortness of breath and recent weight gain.  On questioning, patient has no complaints at this time but EMS reports that patient has been gaining weight over the last month, approximately 20 pounds with a 5 pound weight gain last night.  Patient does have bilateral lower extremity edema and has been complaining of shortness of breath over the last 1.5 weeks per daughter.    Vitals reviewed, largely unremarkable, oxygen stable on room air.  No increased work of breathing appreciated but patient does have bilateral lower lobe crackles concerning for fluid overload.  Patient also has bilateral lower extremity pitting edema up to her knees.  On chart review patient has gained approximately 17 pounds since 5/9/2025.  Patient is already taking Xarelto making likelihood of PE less likely.  No lower extremity unilateral swelling or erythema appreciated to suggest DVT.  Plan for basic labs including BNP to evaluate for CHF exacerbation, will evaluate with chest x-ray as well to look for pulmonary edema versus pneumonia.  Patient has not been having any infectious symptoms making pneumonia less likely.    Amount and/or Complexity of Data Reviewed  Labs: ordered. Decision-making details documented in ED Course.  Radiology: ordered and independent interpretation performed.    Risk  Prescription drug management.  Decision regarding hospitalization.      Wt Readings  from Last 3 Encounters:   06/05/25 62 kg (136 lb 11 oz)   05/27/25 58.9 kg (129 lb 12.8 oz)   05/09/25 54 kg (119 lb)        ED Course as of 06/06/25 0011   u Jun 05, 2025   1624 Per daughter, 1-1/2 weeks shortness of breath, recent weight gain of approximately 20 pounds over the last month.  Patient denies any complaints at this time.   1726 Hemoglobin(!): 10.3  Decreased from 13 baseline     1730 Creatinine: 1.13  Baseline around 0.8-1   1730 hs TnI 0hr: 22  Will repeat 2-hour   1730 BNP(!): 716  Will give Lasix.   1751 Workup suggestive of CHF exacerbation.  Plan for IV Lasix and admission for management.       Medications   acetaminophen (TYLENOL) tablet 650 mg (has no administration in time range)   amiodarone tablet 100 mg ( Oral Held by provider 6/5/25 2003)   atorvastatin (LIPITOR) tablet 20 mg (has no administration in time range)   bumetanide (BUMEX) tablet 0.5 mg ( Oral Held by provider 6/5/25 1940)   escitalopram (LEXAPRO) tablet 15 mg ( Oral Held by provider 6/5/25 2003)   ferrous sulfate tablet 325 mg (has no administration in time range)   metoprolol succinate (TOPROL-XL) 24 hr tablet 12.5 mg (has no administration in time range)   polyethylene glycol (MIRALAX) packet 17 g (has no administration in time range)   rivaroxaban (XARELTO) tablet 15 mg (has no administration in time range)   potassium chloride (Klor-Con M20) CR tablet 20 mEq (has no administration in time range)   cyanocobalamin (VITAMIN B-12) tablet 500 mcg (has no administration in time range)   furosemide (LASIX) injection 20 mg (has no administration in time range)   furosemide (LASIX) injection 40 mg (40 mg Intravenous Given 6/5/25 1827)       ED Risk Strat Scores   HEART Risk Score      Flowsheet Row Most Recent Value   Heart Score Risk Calculator    History 1 Filed at: 06/06/2025 0010   ECG 1 Filed at: 06/06/2025 0010   Age 2 Filed at: 06/06/2025 0010   Risk Factors 1 Filed at: 06/06/2025 0010   Troponin 1 Filed at: 06/06/2025 0010    HEART Score 6 Filed at: 06/06/2025 0010          HEART Risk Score      Flowsheet Row Most Recent Value   Heart Score Risk Calculator    History 1 Filed at: 06/06/2025 0010   ECG 1 Filed at: 06/06/2025 0010   Age 2 Filed at: 06/06/2025 0010   Risk Factors 1 Filed at: 06/06/2025 0010   Troponin 1 Filed at: 06/06/2025 0010   HEART Score 6 Filed at: 06/06/2025 0010                      No data recorded                            History of Present Illness       Chief Complaint   Patient presents with    Shortness of Breath     Arrives via EMS from [facility] with c/o SOB, hx of CHF. BLLE. Per EMS, staff reported the patient gained 20 lbs over the last month, 5 lbs over night. Denies CP. GCS 14 at baseline.       Past Medical History[1]   Past Surgical History[2]   Family History[3]   Social History[4]   E-Cigarette/Vaping    E-Cigarette Use Never User       E-Cigarette/Vaping Substances    Nicotine No     THC No     CBD No     Flavoring No     Other No     Unknown No       I have reviewed and agree with the history as documented.     84-year-old female history of CHF, dementia sent in from [facility] due to concerns of shortness of breath and recent weight gain.  On questioning, patient has no complaints at this time but EMS reports that patient has been gaining weight over the last month, approximately 20 pounds with a 5 pound weight gain last night.  Patient does have bilateral lower extremity edema and has been complaining of shortness of breath over the last 1.5 weeks per daughter.        Review of Systems   Unable to perform ROS: Dementia           Objective       ED Triage Vitals   Temperature Pulse Blood Pressure Respirations SpO2 Patient Position - Orthostatic VS   06/05/25 1611 06/05/25 1611 06/05/25 1611 06/05/25 1611 06/05/25 1607 06/05/25 1611   (!) 97.4 °F (36.3 °C) 90 111/73 16 98 % Sitting      Temp Source Heart Rate Source BP Location FiO2 (%) Pain Score    06/05/25 1611 06/05/25  1611 06/05/25 1611 -- 06/05/25 2024    Oral Monitor Right arm  No Pain      Vitals      Date and Time Temp Pulse SpO2 Resp BP Pain Score FACES Pain Rating User   06/05/25 2205 98.8 °F (37.1 °C) -- -- 14 -- -- -- MM   06/05/25 2159 -- 71 92 % 16 136/85 -- -- DII   06/05/25 2100 -- -- -- -- -- No Pain -- ES   06/05/25 2024 -- -- -- -- -- No Pain -- ES   06/05/25 2015 97.1 °F (36.2 °C) -- 93 % -- -- -- -- ES   06/05/25 2015 -- 70 -- -- 122/81 -- -- DII   06/05/25 1941 99.8 °F (37.7 °C) 71 92 % 14 136/85 -- -- ZB   06/05/25 1830 -- 71 95 % 16 126/60 -- -- AF   06/05/25 1611 97.4 °F (36.3 °C) 90 95 % 16 111/73 -- -- LR   06/05/25 1607 -- -- 98 % -- -- -- -- LR            Physical Exam  Vitals and nursing note reviewed.   Constitutional:       General: She is not in acute distress.     Appearance: She is well-developed.   HENT:      Head: Normocephalic and atraumatic.      Nose: Nose normal. No congestion.     Eyes:      Extraocular Movements: Extraocular movements intact.      Conjunctiva/sclera: Conjunctivae normal.       Cardiovascular:      Rate and Rhythm: Normal rate and regular rhythm.      Pulses: Normal pulses.      Heart sounds: Normal heart sounds. No murmur heard.  Pulmonary:      Effort: Pulmonary effort is normal. No respiratory distress.      Breath sounds: Rales present. No wheezing.   Chest:      Chest wall: No tenderness.   Abdominal:      General: Abdomen is flat. Bowel sounds are normal.      Palpations: Abdomen is soft.      Tenderness: There is no abdominal tenderness. There is no right CVA tenderness or left CVA tenderness.     Musculoskeletal:         General: No deformity or signs of injury.      Cervical back: Normal range of motion and neck supple. No rigidity or tenderness.      Right lower leg: Edema present.      Left lower leg: Edema present.     Skin:     General: Skin is warm and dry.      Findings: No bruising, lesion or rash.     Neurological:      Mental Status: She is alert. Mental  status is at baseline. She is disoriented.      Sensory: No sensory deficit.         Results Reviewed       Procedure Component Value Units Date/Time    HS Troponin I 4hr [[id number]]  (Normal) Collected: 06/05/25 2049    Lab Status: Final result Specimen: Blood from Hand, Right Updated: 06/05/25 2125     hs TnI 4hr 23 ng/L      Delta 4hr hsTnI 1 ng/L     HS Troponin I 2hr [[id number]]  (Normal) Collected: 06/05/25 1905    Lab Status: Final result Specimen: Blood from Arm, Left Updated: 06/05/25 1946     hs TnI 2hr 22 ng/L      Delta 2hr hsTnI 0 ng/L     HS Troponin 0hr (reflex protocol) [[id number]]  (Normal) Collected: 06/05/25 1652    Lab Status: Final result Specimen: Blood from Arm, Left Updated: 06/05/25 1724     hs TnI 0hr 22 ng/L     B-Type Natriuretic Peptide(BNP) [[id number]]  (Abnormal) Collected: 06/05/25 1652    Lab Status: Final result Specimen: Blood from Arm, Left Updated: 06/05/25 1722      pg/mL     Comprehensive metabolic panel [[id number]] Collected: 06/05/25 1652    Lab Status: Final result Specimen: Blood from Arm, Left Updated: 06/05/25 1718     Sodium 139 mmol/L      Potassium 3.9 mmol/L      Chloride 106 mmol/L      CO2 26 mmol/L      ANION GAP 7 mmol/L      BUN 20 mg/dL      Creatinine 1.13 mg/dL      Glucose 95 mg/dL      Calcium 8.7 mg/dL      AST 22 U/L      ALT 31 U/L      Alkaline Phosphatase 93 U/L      Total Protein 7.0 g/dL      Albumin 3.7 g/dL      Total Bilirubin 0.91 mg/dL      eGFR 44 ml/min/1.73sq m     Narrative:      National Kid[employer] guidelines for Chronic Kidney Disease (CKD):     Stage 1 with normal or high GFR (GFR > 90 mL/min/1.73 square meters)    Stage 2 Mild CKD (GFR = 60-89 mL/min/1.73 square meters)    Stage 3A Moderate CKD (GFR = 45-59 mL/min/1.73 square meters)    Stage 3B Moderate CKD (GFR = 30-44 mL/min/1.73 square meters)    Stage 4 Severe CKD (GFR = 15-29 mL/min/1.73 square meters)    Stage 5 End Stage CKD (GFR <15 mL/min/1.73 square  meters)  Note: GFR calculation is accurate only with a steady state creatinine    CBC and differential [[id number]]  (Abnormal) Collected: 06/05/25 1652    Lab Status: Final result Specimen: Blood from Arm, Left Updated: 06/05/25 1703     WBC 6.24 Thousand/uL      RBC 3.78 Million/uL      Hemoglobin 10.3 g/dL      Hematocrit 33.6 %      MCV 89 fL      MCH 27.2 pg      MCHC 30.7 g/dL      RDW 16.3 %      MPV 10.3 fL      Platelets 253 Thousands/uL      nRBC 0 /100 WBCs      Segmented % 69 %      Immature Grans % 1 %      Lymphocytes % 10 %      Monocytes % 13 %      Eosinophils Relative 6 %      Basophils Relative 1 %      Absolute Neutrophils 4.35 Thousands/µL      Absolute Immature Grans 0.04 Thousand/uL      Absolute Lymphocytes 0.65 Thousands/µL      Absolute Monocytes 0.80 Thousand/µL      Eosinophils Absolute 0.35 Thousand/µL      Basophils Absolute 0.05 Thousands/µL             XR chest 1 view portable   ED Interpretation by Zia Chavez MD (06/05 1664)   My personal interpretation-lower lobe pulmonary edema, left worse than the right concerning for pulmonary edema likely secondary to CHF exacerbation.          ECG 12 Lead Documentation Only    Date/Time: 6/5/2025 4:27 PM    Performed by: Zia Chavez MD  Authorized by: Zia Chavez MD    Patient location:  ED  Interpretation:     Interpretation: abnormal    Rate:     ECG rate:  84  Rhythm:     Rhythm: paced    Pacing:     Type of pacing:  AV  Ectopy:     Ectopy: none        ED Medication and Procedure Management   Prior to Admission Medications   Prescriptions Last Dose Informant Patient Reported? Taking?   acetaminophen (TYLENOL) 325 mg tablet  Spouse/Significant Other No No   Sig: Take 2 tablets (650 mg total) by mouth every 4 (four) hours as needed for mild pain   amiodarone 200 mg tablet   No No   Sig: Take 0.5 tablets (100 mg total) by mouth daily   atorvastatin (LIPITOR) 20 mg tablet   No No   Sig: Take 1 tablet (20 mg total) by mouth daily with  dinner   bumetanide (BUMEX) 0.5 MG tablet   No No   Sig: Take 1 tablet (0.5 mg total) by mouth daily as needed (Lower extremity edema, 2lb weight gain in 24 hours or 5 lbs in 1 week) for up to 30 doses   escitalopram (LEXAPRO) 10 mg tablet   No No   Sig: Take 1 tablet (10 mg total) by mouth daily   Patient taking differently: Take 15 mg by mouth in the morning.   ferrous sulfate 325 (65 FE) MG EC tablet   Yes Yes   Sig: Take 325 mg by mouth in the morning   metoprolol succinate (TOPROL-XL) 25 mg 24 hr tablet   No No   Sig: Take 0.5 tablets (12.5 mg total) by mouth daily   nitroglycerin (NITROSTAT) 0.4 mg SL tablet  Spouse/Significant Other No No   Sig: Place 1 tablet (0.4 mg total) under the tongue every 5 (five) minutes as needed for chest pain   polyethylene glycol (MIRALAX) 17 g packet   No No   Sig: Take 17 g by mouth daily for 7 days   potassium chloride (MICRO-K) 10 MEQ CR capsule   Yes Yes   Sig: Take 20 mEq by mouth daily   rivaroxaban (Xarelto) 15 mg tablet   No No   Sig: Take 1 tablet (15 mg total) by mouth daily with breakfast   vitamin B-12 (VITAMIN B-12) 500 mcg tablet   Yes Yes   Sig: Take 500 mcg by mouth daily      Facility-Administered Medications: None     Current Discharge Medication List        CONTINUE these medications which have NOT CHANGED    Details   ferrous sulfate 325 (65 FE) MG EC tablet Take 325 mg by mouth in the morning      potassium chloride (MICRO-K) 10 MEQ CR capsule Take 20 mEq by mouth daily      vitamin B-12 (VITAMIN B-12) 500 mcg tablet Take 500 mcg by mouth daily      acetaminophen (TYLENOL) 325 mg tablet Take 2 tablets (650 mg total) by mouth every 4 (four) hours as needed for mild pain  Refills: 0    Associated Diagnoses: Fall, initial encounter      amiodarone 200 mg tablet Take 0.5 tablets (100 mg total) by mouth daily  Qty: 45 tablet, Refills: 3    Associated Diagnoses: A-fib (HCC)      atorvastatin (LIPITOR) 20 mg tablet Take 1 tablet (20 mg total) by mouth daily with  dinner  Qty: 90 tablet, Refills: 4    Associated Diagnoses: Coronary artery disease involving native heart without angina pectoris, unspecified vessel or lesion type      bumetanide (BUMEX) 0.5 MG tablet Take 1 tablet (0.5 mg total) by mouth daily as needed (Lower extremity edema, 2lb weight gain in 24 hours or 5 lbs in 1 week) for up to 30 doses  Qty: 30 tablet, Refills: 0    Associated Diagnoses: CHF (congestive heart failure) (HCC)      escitalopram (LEXAPRO) 10 mg tablet Take 1 tablet (10 mg total) by mouth daily  Qty: 90 tablet, Refills: 1    Associated Diagnoses: Other depression      metoprolol succinate (TOPROL-XL) 25 mg 24 hr tablet Take 0.5 tablets (12.5 mg total) by mouth daily  Qty: 15 tablet, Refills: 0    Associated Diagnoses: Paroxysmal atrial fibrillation (HCC)      nitroglycerin (NITROSTAT) 0.4 mg SL tablet Place 1 tablet (0.4 mg total) under the tongue every 5 (five) minutes as needed for chest pain  Qty: 25 tablet, Refills: 0    Comments: DX Code Needed  NEED REFILLS.  Associated Diagnoses: Coronary artery disease involving native heart without angina pectoris, unspecified vessel or lesion type      polyethylene glycol (MIRALAX) 17 g packet Take 17 g by mouth daily for 7 days  Qty: 119 g, Refills: 0    Associated Diagnoses: Other constipation      rivaroxaban (Xarelto) 15 mg tablet Take 1 tablet (15 mg total) by mouth daily with breakfast  Qty: 90 tablet, Refills: 3    Associated Diagnoses: Coronary arteriosclerosis           No discharge procedures on file.  ED SEPSIS DOCUMENTATION   Time reflects when diagnosis was documented in both MDM as applicable and the Disposition within this note       Time User Action Codes Description Comment    6/5/2025  5:58 PM Zia Chavez [I50.9] CHF exacerbation (HCC)     6/5/2025  5:58 PM Zia Chavez [D64.9] Anemia                    [1]   Past Medical History:  Diagnosis Date    Abnormal liver enzymes 01/21/2021    Acute (reversible) ischemia of  small intestine, extent unspecified ([facility]) 01/14/2022    Anal fissure     Cardiac disorder     Closed fracture of fifth metacarpal bone of right hand 11/13/2023    Closed nondisplaced fracture of fifth metacarpal bone of right hand, unspecified portion of metacarpal, initial encounter 11/17/2023    Cognitive changes 12/23/2020    Edema of left upper extremity 01/22/2021    Esophageal reflux 12/15/2023    Esophageal reflux 12/15/2023    Esophagitis, reflux     Fall 10/04/2022    Hemorrhoids     Hepatic hemangioma     Last Assessed: 1/13/2015    Herpes zoster     History of colonic polyps     Hypertension     Ischemic colitis ([facility])     Lumbar herniated disc     Malignant neoplasm without specification of site ([facility])     Memory loss 05/03/2021    Multiple contusions 11/12/2023    Nephrolithiasis     L. 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    Urinary retention 01/22/2021    Uterovaginal prolapse     Grade I-II    Wound of right leg 12/22/2022   [2]   Past Surgical History:  Procedure Laterality Date    APPENDECTOMY  1947    CARDIAC PACEMAKER PLACEMENT  01/2021    CARDIAC SURGERY      CABG    CATARACT EXTRACTION Bilateral     COLONOSCOPY  2012    Fiberoptic    COLONOSCOPY      Resolved: 2006 - 2012 5 year f/u    CORONARY ANGIOPLASTY WITH STENT PLACEMENT      CORONARY ARTERY BYPASS GRAFT      Resolved: 2012    ESOPHAGOGASTRODUODENOSCOPY  2012    Diagnostic    HEMORROIDECTOMY      KNEE SURGERY      LITHOTRIPSY      Renal    MALIGNANT SKIN LESION EXCISION      Face; Resolved: 2004    KS ESOPHAGOGASTRODUODENOSCOPY TRANSORAL DIAGNOSTIC N/A 4/13/2016    Procedure: EGD AND COLONOSCOPY;  Surgeon: Evelin Bone MD;  Location: [facility];  Service: Gastroenterology    RENAL ARTERY STENT      SKIN LESION EXCISION      Scalp    SOFT TISSUE TUMOR RESECTION      Shoulder; Resolved: 1995    SPLIT THICKNESS SKIN GRAFT Right 12/14/2023    Procedure: SKIN GRAFT SPLIT THICKNESS (STSG)  EXTREMITY; VAC application;  Surgeon: Ap Brito DO;  Location: [facility];  Service: General    THROMBOLYSIS      Postoperative Thrombolysis PTCA    TONSILLECTOMY      Resolved: 1944    WOUND DEBRIDEMENT Right 12/8/2023    Procedure: DEBRIDEMENT LOWER EXTREMITY (WASH OUT); POSSIBLE VAC APPLICATION;  Surgeon: Abhijeet Davies MD;  Location: [facility];  Service: General   [3]   Family History  Problem Relation Name Age of Onset    Heart disease Mother      Hypertension Mother      Osteoporosis Mother      Heart disease Father      Hypertension Father      No Known Problems Sister      Heart disease Brother      Diabetes Brother      No Known Problems Daughter      No Known Problems Son      No Known Problems Maternal Grandmother      No Known Problems Maternal Grandfather      No Known Problems Paternal Grandmother      No Known Problems Paternal Grandfather      Ulcerative colitis Family      Colon polyps Family      Breast cancer Neg Hx      Colon cancer Neg Hx      Ovarian cancer Neg Hx     [4]   Social History  Tobacco Use    Smoking status: Never    Smokeless tobacco: Never   Vaping Use    Vaping status: Never Used   Substance Use Topics    Alcohol use: Yes     Alcohol/week: 1.0 standard drink of alcohol     Types: 1 Glasses of wine per week     Comment: occasionally, but no alcohol intake recently    Drug use: Never        Zia Chavez MD  06/06/25 0011

## 2025-06-05 NOTE — TELEPHONE ENCOUNTER
Staff from personal care facility requests return call from provider to discuss patient's recent weight gain, up 5 lbs in 24 hours and up 20 lbs since 4/26. He requests provider okay to send patient to hospital.

## 2025-06-06 PROBLEM — D64.9 NORMOCYTIC ANEMIA: Status: ACTIVE | Noted: 2025-06-06

## 2025-06-06 LAB
ANION GAP SERPL CALCULATED.3IONS-SCNC: 7 MMOL/L (ref 4–13)
ATRIAL RATE: 84 BPM
BUN SERPL-MCNC: 22 MG/DL (ref 5–25)
CALCIUM SERPL-MCNC: 8.4 MG/DL (ref 8.4–10.2)
CHLORIDE SERPL-SCNC: 108 MMOL/L (ref 96–108)
CO2 SERPL-SCNC: 23 MMOL/L (ref 21–32)
CREAT SERPL-MCNC: 1.11 MG/DL (ref 0.6–1.3)
ERYTHROCYTE [DISTWIDTH] IN BLOOD BY AUTOMATED COUNT: 16.5 % (ref 11.6–15.1)
FERRITIN SERPL-MCNC: 24 NG/ML (ref 30–307)
GFR SERPL CREATININE-BSD FRML MDRD: 45 ML/MIN/1.73SQ M
GLUCOSE SERPL-MCNC: 133 MG/DL (ref 65–140)
HCT VFR BLD AUTO: 32.1 % (ref 34.8–46.1)
HGB BLD-MCNC: 9.7 G/DL (ref 11.5–15.4)
IRON SATN MFR SERPL: 18 % (ref 15–50)
IRON SERPL-MCNC: 81 UG/DL (ref 50–212)
MCH RBC QN AUTO: 27.8 PG (ref 26.8–34.3)
MCHC RBC AUTO-ENTMCNC: 30.2 G/DL (ref 31.4–37.4)
MCV RBC AUTO: 92 FL (ref 82–98)
P AXIS: 74 DEGREES
PLATELET # BLD AUTO: 210 THOUSANDS/UL (ref 149–390)
PMV BLD AUTO: 10.7 FL (ref 8.9–12.7)
POTASSIUM SERPL-SCNC: 3.6 MMOL/L (ref 3.5–5.3)
QRS AXIS: 6 DEGREES
QRSD INTERVAL: 90 MS
QT INTERVAL: 474 MS
QTC INTERVAL: 560 MS
RBC # BLD AUTO: 3.49 MILLION/UL (ref 3.81–5.12)
SODIUM SERPL-SCNC: 138 MMOL/L (ref 135–147)
T WAVE AXIS: 53 DEGREES
TIBC SERPL-MCNC: 441 UG/DL (ref 250–450)
TRANSFERRIN SERPL-MCNC: 315 MG/DL (ref 203–362)
UIBC SERPL-MCNC: 360 UG/DL (ref 155–355)
VENTRICULAR RATE: 84 BPM
WBC # BLD AUTO: 5.5 THOUSAND/UL (ref 4.31–10.16)

## 2025-06-06 PROCEDURE — 80048 BASIC METABOLIC PNL TOTAL CA: CPT

## 2025-06-06 PROCEDURE — 85027 COMPLETE CBC AUTOMATED: CPT

## 2025-06-06 PROCEDURE — 83540 ASSAY OF IRON: CPT | Performed by: INTERNAL MEDICINE

## 2025-06-06 PROCEDURE — 82728 ASSAY OF FERRITIN: CPT | Performed by: INTERNAL MEDICINE

## 2025-06-06 PROCEDURE — 99232 SBSQ HOSP IP/OBS MODERATE 35: CPT | Performed by: INTERNAL MEDICINE

## 2025-06-06 PROCEDURE — 83550 IRON BINDING TEST: CPT | Performed by: INTERNAL MEDICINE

## 2025-06-06 PROCEDURE — 93010 ELECTROCARDIOGRAM REPORT: CPT | Performed by: INTERNAL MEDICINE

## 2025-06-06 RX ADMIN — FERROUS SULFATE TAB 325 MG (65 MG ELEMENTAL FE) 325 MG: 325 (65 FE) TAB at 08:10

## 2025-06-06 RX ADMIN — POTASSIUM CHLORIDE 20 MEQ: 1500 TABLET, EXTENDED RELEASE ORAL at 08:10

## 2025-06-06 RX ADMIN — Medication 500 MCG: at 08:10

## 2025-06-06 RX ADMIN — FUROSEMIDE 20 MG: 10 INJECTION, SOLUTION INTRAVENOUS at 08:11

## 2025-06-06 RX ADMIN — ATORVASTATIN CALCIUM 20 MG: 20 TABLET, FILM COATED ORAL at 15:19

## 2025-06-06 RX ADMIN — METOPROLOL SUCCINATE 12.5 MG: 25 TABLET, EXTENDED RELEASE ORAL at 08:10

## 2025-06-06 RX ADMIN — RIVAROXABAN 15 MG: 15 TABLET, FILM COATED ORAL at 08:11

## 2025-06-06 RX ADMIN — FUROSEMIDE 20 MG: 10 INJECTION, SOLUTION INTRAVENOUS at 15:20

## 2025-06-06 NOTE — PLAN OF CARE
Problem: PAIN - ADULT  Goal: Verbalizes/displays adequate comfort level or baseline comfort level  Description: Interventions:  - Encourage patient to monitor pain and request assistance  - Assess pain using appropriate pain scale  - Administer analgesics as ordered based on type and severity of pain and evaluate response  - Implement non-pharmacological measures as appropriate and evaluate response  - Consider cultural and social influences on pain and pain management  - Notify physician/advanced practitioner if interventions unsuccessful or patient reports new pain  - Educate patient/family on pain management process including their role and importance of  reporting pain   - Provide non-pharmacologic/complimentary pain relief interventions  6/6/2025 0823 by Naomi Flynn RN  Outcome: Progressing  6/6/2025 0823 by Naomi Flynn RN  Outcome: Progressing     Problem: INFECTION - ADULT  Goal: Absence or prevention of progression during hospitalization  Description: INTERVENTIONS:  - Assess and monitor for signs and symptoms of infection  - Monitor lab/diagnostic results  - Monitor all insertion sites, i.e. indwelling lines, tubes, and drains  - Monitor endotracheal if appropriate and nasal secretions for changes in amount and color  - [place] appropriate cooling/warming therapies per order  - Administer medications as ordered  - Instruct and encourage patient and family to use good hand hygiene technique  - Identify and instruct in appropriate isolation precautions for identified infection/condition  6/6/2025 0823 by Naomi Flynn RN  Outcome: Progressing  6/6/2025 0823 by Naomi Flynn RN  Outcome: Progressing  Goal: Absence of fever/infection during neutropenic period  Description: INTERVENTIONS:  - Monitor WBC  - Perform strict hand hygiene  - Limit to healthy visitors only  - No plants, dried, fresh or silk flowers with wagoner in patient room  6/6/2025 0823 by Naomi Flynn RN  Outcome: Progressing  6/6/2025 0823 by  Naomi Flynn RN  Outcome: Progressing     Problem: SAFETY ADULT  Goal: Patient will remain free of falls  Description: INTERVENTIONS:  - Educate patient/family on patient safety including physical limitations  - Instruct patient to call for assistance with activity   - Consider consulting OT/PT to assist with strengthening/mobility based on AM PAC & JH-HLM score  - Consult OT/PT to assist with strengthening/mobility   - Keep Call bell within reach  - Keep bed low and locked with side rails adjusted as appropriate  - Keep care items and personal belongings within reach  - Initiate and maintain comfort rounds  - Make Fall Risk Sign visible to staff  - Offer Toileting every 2 Hours, in advance of need  - Initiate/Maintain bed/chair alarm  - Obtain necessary fall risk management equipment  - Apply yellow socks and bracelet for high fall risk patients  - Consider moving patient to room near nurses station  6/6/2025 0823 by Naomi Flynn RN  Outcome: Progressing  6/6/2025 0823 by Naomi Flynn RN  Outcome: Progressing  Goal: Maintain or return to baseline ADL function  Description: INTERVENTIONS:  -  Assess patient's ability to carry out ADLs; assess patient's baseline for ADL function and identify physical deficits which impact ability to perform ADLs (bathing, care of mouth/teeth, toileting, grooming, dressing, etc.)  - Assess/evaluate cause of self-care deficits   - Assess range of motion  - Assess patient's mobility; develop plan if impaired  - Assess patient's need for assistive devices and provide as appropriate  - Encourage maximum independence but intervene and supervise when necessary  - Involve family in performance of ADLs  - Assess for home care needs following discharge   - Consider OT consult to assist with ADL evaluation and planning for discharge  - Provide patient education as appropriate  - Monitor functional capacity and physical performance, use of AM PAC & JH-HLM   - Monitor gait, balance and fatigue  with ambulation    6/6/2025 0823 by Naomi Flynn RN  Outcome: Progressing  6/6/2025 0823 by Naomi Flynn RN  Outcome: Progressing  Goal: Maintains/Returns to pre admission functional level  Description: INTERVENTIONS:  - Perform AM-PAC 6 Click Basic Mobility/ Daily Activity assessment daily.  - Set and communicate daily mobility goal to care team and patient/family/caregiver.   - Collaborate with rehabilitation services on mobility goals if consulted  - Out of bed to chair 3 times a day   - Out of bed for meals 3 times a day  - Out of bed for toileting  - Record patient progress and toleration of activity level   6/6/2025 0823 by Naomi Flynn RN  Outcome: Progressing  6/6/2025 0823 by Naomi Flynn RN  Outcome: Progressing     Problem: DISCHARGE PLANNING  Goal: Discharge to home or other facility with appropriate resources  Description: INTERVENTIONS:  - Identify barriers to discharge w/patient and caregiver  - Arrange for needed discharge resources and transportation as appropriate  - Identify discharge learning needs (meds, wound care, etc.)  - Arrange for interpretive services to assist at discharge as needed  - Refer to Case Management Department for coordinating discharge planning if the patient needs post-hospital services based on physician/advanced practitioner order or complex needs related to functional status, cognitive ability, or social support system  6/6/2025 0823 by Naomi Flynn RN  Outcome: Progressing  6/6/2025 0823 by Naomi Flynn RN  Outcome: Progressing     Problem: Knowledge Deficit  Goal: Patient/family/caregiver demonstrates understanding of disease process, treatment plan, medications, and discharge instructions  Description: Complete learning assessment and assess knowledge base.  Interventions:  - Provide teaching at level of understanding  - Provide teaching via preferred learning methods  6/6/2025 0823 by Naomi Flynn RN  Outcome: Progressing  6/6/2025 0823 by Naomi Flynn RN  Outcome:  Progressing     Problem: Prexisting or High Potential for Compromised Skin Integrity  Goal: Skin integrity is maintained or improved  Description: INTERVENTIONS:  - Identify patients at risk for skin breakdown  - Assess and monitor skin integrity including under and around medical devices   - Assess and monitor nutrition and hydration status  - Monitor labs  - Assess for incontinence   - Turn and reposition patient  - Assist with mobility/ambulation  - Relieve pressure over solo prominences   - Avoid friction and shearing  - Provide appropriate hygiene as needed including keeping skin clean and dry  - Evaluate need for skin moisturizer/barrier cream  - Collaborate with interdisciplinary team  - Patient/family teaching  - Consider wound care consult    Assess:  - Review Kumar scale daily  - Clean and moisturize skin every day  - Inspect skin when repositioning, toileting, and assisting with ADLS  - Assess under medical devices   - Assess extremities for adequate circulation and sensation     Bed Management:  - Have minimal linens on bed & keep smooth, unwrinkled  - Change linens as needed when moist or perspiring  - Avoid sitting or lying in one position for more than 2 hours while in bed?Keep HOB at 30 degrees   - Toileting:  - Offer bedside commode  - Assess for incontinence every hour  - Use incontinent care products after each incontinent episode     Activity:  - Mobilize patient 3 times a day  - Encourage activity and walks on unit  - Encourage or provide ROM exercises   - Turn and reposition patient every 2 Hours  - Use appropriate equipment to lift or move patient in bed  - Instruct/ Assist with weight shifting every hour when out of bed in chair  - Consider limitation of chair time 2 hour intervals    Skin Care:  - Avoid use of baby powder, tape, friction and shearing, hot water or constrictive clothing  - Relieve pressure over bony prominences using mepilex  - Do not massage red bony areas    Next  Steps:  - Teach patient strategies to minimize risks   - Consider consults to  interdisciplinary teams  6/6/2025 0823 by Naomi Flynn RN  Outcome: Progressing  6/6/2025 0823 by Naomi Flynn RN  Outcome: Progressing     Problem: CARDIOVASCULAR - ADULT  Goal: Maintains optimal cardiac output and hemodynamic stability  Description: INTERVENTIONS:  - Monitor I/O, vital signs and rhythm  - Monitor for S/S and trends of decreased cardiac output  - Administer and titrate ordered vasoactive medications to optimize hemodynamic stability  - Assess quality of pulses, skin color and temperature  - Assess for signs of decreased coronary artery perfusion  - Instruct patient to report change in severity of symptoms  Outcome: Progressing  Goal: Absence of cardiac dysrhythmias or at baseline rhythm  Description: INTERVENTIONS:  - Continuous cardiac monitoring, vital signs, obtain 12 lead EKG if ordered  - Administer antiarrhythmic and heart rate control medications as ordered  - Monitor electrolytes and administer replacement therapy as ordered  Outcome: Progressing

## 2025-06-06 NOTE — PROGRESS NOTES
Progress Note - Hospitalist   Name: Veritoricardo Dominguezrehan 84 y.o. female I MRN: [id number]  Unit/Bed#: S -01 I Date of Admission: 6/5/2025   Date of Service: 6/6/2025 I Hospital Day: 1    Assessment & Plan  Acute on chronic combined systolic and diastolic CHF (congestive heart failure) (Formerly [facility])  Wt Readings from Last 3 Encounters:   06/06/25 58.7 kg (129 lb 6.4 oz)   05/27/25 58.9 kg (129 lb 12.8 oz)   05/09/25 54 kg (119 lb)     Patient w/ hx CHF presents from SNF due to 17 pound weight gain over the past month and shortness of breath.   1/2021 echo EF 50%, severe hypokinesis in the distal septum, grade 2 DD, moderate MR  Home regimen: Bumex 0.5 mg daily, metoprolol 12.5 mg daily    Trop 22/22/23  CXR: Upon my read patient has pulmonary edema L>R  Status post Lasix 40 mg IV in the ED    Plan   Hold home Bumex  Lasix 20 mg twice daily IV  F/u echo  Daily standing weights  Strict I's and O's  Fluid restrict 1.8 L, 2 g NA  A-fib (Formerly [facility])  ECG upon arrival paced and rate controlled at 84  Home regimen amiodarone 100 mg daily, metoprolol 12.5 mg daily, Xarelto 15 mg daily  Continue home meds  Hold amiodarone ue to prolonged QTcd  CAD (coronary artery disease)  CAD status post CABG x 4 (2011) and LAMINE (2021)  Home regimen: Lipitor 20 mg qd, metoprolol 12.5 mg daily.  Not on antiplatelets antiplatelets  Continue home medications  Chronic kidney disease, stage 3b (Formerly [facility])  Lab Results   Component Value Date    EGFR 45 06/06/2025    EGFR 44 06/05/2025    EGFR 73 05/19/2025    CREATININE 1.11 06/06/2025    CREATININE 1.13 06/05/2025    CREATININE 0.79 05/19/2025     Baseline 0.8-1.0  Creatinine at baseline  Continue to monitor with BMP as needed  Avoid nephrotoxins  Hyperlipidemia  Lab Results   Component Value Date    CHOLESTEROL 168 12/04/2020    TRIG 88 12/04/2020    HDL 62 12/04/2020    LDLCALC 88 12/04/2020     Home regimen: Lipitor 20 mg qd, continue  Normocytic anemia  Recent Labs     06/05/25  1652 06/06/25  0525   HGB  "10.3* 9.7*     No results found for: \"IRON\", \"FERRITIN\", \"TIBC\", \"CONCFE\"    Hx B12 deficiency   No active bleeding  Unsure of last colonoscopy     Plan   F/u iron panel  Monitor CBC    VTE Pharmacologic Prophylaxis: VTE Score: 5 High Risk (Score >/= 5) - Pharmacological DVT Prophylaxis Ordered: rivaroxaban (Xarelto). Sequential Compression Devices Ordered.    Mobility:   Basic Mobility Inpatient Raw Score: 19  JH-HLM Goal: 6: Walk 10 steps or more  JH-HLM Achieved: 6: Walk 10 steps or more  JH-HLM Goal achieved. Continue to encourage appropriate mobility.    Patient Centered Rounds: I performed bedside rounds with nursing staff today.   Discussions with Specialists or Other Care Team Provider: N/a    Education and Discussions with Family / Patient: will call daughter.     Current Length of Stay: 1 day(s)  Current Patient Status: Inpatient   Certification Statement: The patient will continue to require additional inpatient hospital stay due to CHF exacerbation requiring diuresis   Discharge Plan: Anticipate discharge in 48 hrs to [facility] but likely [facility]    Code Status: Level 3 - DNAR and DNI    Subjective   Patient seen and examined at bedside.  No overnight events.  Patient states she slept well.  Patient eating breakfast and tolerating it well.  Denies any shortness of breath, chest pain, abdominal pain, urinary symptoms at this time.    Objective :  Temp:  [97.1 °F (36.2 °C)-99.8 °F (37.7 °C)] 98.8 °F (37.1 °C)  HR:  [70-90] 74  BP: (111-136)/(60-85) 120/82  Resp:  [14-16] 14  SpO2:  [90 %-98 %] 90 %  O2 Device: None (Room air)    Body mass index is 26.14 kg/m².     Input and Output Summary (last 24 hours):     Intake/Output Summary (Last 24 hours) at 6/6/2025 1036  Last data filed at 6/6/2025 0749  Gross per 24 hour   Intake 170 ml   Output 1870 ml   Net -1700 ml       Physical Exam  Vitals and nursing note reviewed.   Constitutional:       Appearance: Normal appearance.   HENT:      Head: " Normocephalic and atraumatic.      Mouth/Throat:      Mouth: Mucous membranes are moist.     Eyes:      Extraocular Movements: Extraocular movements intact.      Conjunctiva/sclera: Conjunctivae normal.      Pupils: Pupils are equal, round, and reactive to light.       Cardiovascular:      Rate and Rhythm: Normal rate and regular rhythm.      Comments: Pacemaker noted to left chest wall  Pulmonary:      Effort: Pulmonary effort is normal. No respiratory distress.      Breath sounds: Rales present. No wheezing.   Abdominal:      General: Bowel sounds are normal. There is no distension.      Palpations: Abdomen is soft.      Tenderness: There is no abdominal tenderness. There is no guarding.     Musculoskeletal:      Right lower leg: Edema present.      Left lower leg: Edema present.      Comments: 3+ pitting edema up to the knee     Skin:     General: Skin is warm and dry.     Neurological:      General: No focal deficit present.      Mental Status: She is alert.      Comments: Patient pleasantly confused oriented to self and place.   Psychiatric:         Mood and Affect: Mood normal.           Lines/Drains:        Telemetry:  Telemetry Orders (From admission, onward)               24 Hour Telemetry Monitoring  Continuous x 24 Hours (Telem)        Question:  Reason for 24 Hour Telemetry  Answer:  Decompensated CHF- and any one of the following: continuous diuretic infusion or total diuretic dose >200 mg daily, associated electrolyte derangement (I.e. K < 3.0), inotropic drip (continuous infusion), hx of ventricular arrhythmia, or new EF < 35%                     Telemetry Reviewed: paced no events  Indication for Continued Telemetry Use: Acute CHF on >200 mg lasix/day or equivalent dose or with new reduced EF.                Lab Results: I have reviewed the following results:   Results from last 7 days   Lab Units 06/06/25  0525 06/05/25  1652   WBC Thousand/uL 5.50 6.24   HEMOGLOBIN g/dL 9.7* 10.3*   HEMATOCRIT %  32.1* 33.6*   PLATELETS Thousands/uL 210 253   SEGS PCT %  --  69   LYMPHO PCT %  --  10*   MONO PCT %  --  13*   EOS PCT %  --  6     Results from last 7 days   Lab Units 06/06/25  0525 06/05/25  1652   SODIUM mmol/L 138 139   POTASSIUM mmol/L 3.6 3.9   CHLORIDE mmol/L 108 106   CO2 mmol/L 23 26   BUN mg/dL 22 20   CREATININE mg/dL 1.11 1.13   ANION GAP mmol/L 7 7   CALCIUM mg/dL 8.4 8.7   ALBUMIN g/dL  --  3.7   TOTAL BILIRUBIN mg/dL  --  0.91   ALK PHOS U/L  --  93   ALT U/L  --  31   AST U/L  --  22   GLUCOSE RANDOM mg/dL 133 95                       Recent Cultures (last 7 days):               Last 24 Hours Medication List:     Current Facility-Administered Medications:     acetaminophen (TYLENOL) tablet 650 mg, Q6H PRN    [Held by provider] amiodarone tablet 100 mg, Daily    atorvastatin (LIPITOR) tablet 20 mg, Daily With Dinner    [Held by provider] bumetanide (BUMEX) tablet 0.5 mg, Daily PRN    cyanocobalamin (VITAMIN B-12) tablet 500 mcg, Daily    [Held by provider] escitalopram (LEXAPRO) tablet 15 mg, Daily    ferrous sulfate tablet 325 mg, Daily With Breakfast    furosemide (LASIX) injection 20 mg, BID (diuretic)    melatonin tablet 6 mg, HS    metoprolol succinate (TOPROL-XL) 24 hr tablet 12.5 mg, Daily    polyethylene glycol (MIRALAX) packet 17 g, Daily PRN    potassium chloride (Klor-Con M20) CR tablet 20 mEq, Daily    rivaroxaban (XARELTO) tablet 15 mg, Daily With Breakfast    Administrative Statements   Today, Patient Was Seen By: Arin Hernández MD      **Please Note: This note may have been constructed using a voice recognition system.**

## 2025-06-07 LAB
ANION GAP SERPL CALCULATED.3IONS-SCNC: 7 MMOL/L (ref 4–13)
ATRIAL RATE: 72 BPM
ATRIAL RATE: 76 BPM
BUN SERPL-MCNC: 19 MG/DL (ref 5–25)
CALCIUM SERPL-MCNC: 8.3 MG/DL (ref 8.4–10.2)
CHLORIDE SERPL-SCNC: 104 MMOL/L (ref 96–108)
CO2 SERPL-SCNC: 28 MMOL/L (ref 21–32)
CREAT SERPL-MCNC: 1.03 MG/DL (ref 0.6–1.3)
ERYTHROCYTE [DISTWIDTH] IN BLOOD BY AUTOMATED COUNT: 17 % (ref 11.6–15.1)
GFR SERPL CREATININE-BSD FRML MDRD: 49 ML/MIN/1.73SQ M
GLUCOSE SERPL-MCNC: 89 MG/DL (ref 65–140)
HCT VFR BLD AUTO: 34 % (ref 34.8–46.1)
HGB BLD-MCNC: 10 G/DL (ref 11.5–15.4)
MAGNESIUM SERPL-MCNC: 2 MG/DL (ref 1.9–2.7)
MCH RBC QN AUTO: 27.5 PG (ref 26.8–34.3)
MCHC RBC AUTO-ENTMCNC: 29.4 G/DL (ref 31.4–37.4)
MCV RBC AUTO: 93 FL (ref 82–98)
P AXIS: 18 DEGREES
P AXIS: 20 DEGREES
PLATELET # BLD AUTO: 244 THOUSANDS/UL (ref 149–390)
PMV BLD AUTO: 11 FL (ref 8.9–12.7)
POTASSIUM SERPL-SCNC: 3.5 MMOL/L (ref 3.5–5.3)
PR INTERVAL: 110 MS
QRS AXIS: -81 DEGREES
QRS AXIS: -85 DEGREES
QRSD INTERVAL: 138 MS
QRSD INTERVAL: 138 MS
QT INTERVAL: 422 MS
QT INTERVAL: 442 MS
QTC INTERVAL: 475 MS
QTC INTERVAL: 484 MS
RBC # BLD AUTO: 3.64 MILLION/UL (ref 3.81–5.12)
SODIUM SERPL-SCNC: 139 MMOL/L (ref 135–147)
T WAVE AXIS: 241 DEGREES
T WAVE AXIS: 248 DEGREES
VENTRICULAR RATE: 72 BPM
VENTRICULAR RATE: 76 BPM
WBC # BLD AUTO: 7.39 THOUSAND/UL (ref 4.31–10.16)

## 2025-06-07 PROCEDURE — 93005 ELECTROCARDIOGRAM TRACING: CPT

## 2025-06-07 PROCEDURE — 80048 BASIC METABOLIC PNL TOTAL CA: CPT | Performed by: INTERNAL MEDICINE

## 2025-06-07 PROCEDURE — 99232 SBSQ HOSP IP/OBS MODERATE 35: CPT | Performed by: INTERNAL MEDICINE

## 2025-06-07 PROCEDURE — 83735 ASSAY OF MAGNESIUM: CPT | Performed by: INTERNAL MEDICINE

## 2025-06-07 PROCEDURE — 85027 COMPLETE CBC AUTOMATED: CPT | Performed by: INTERNAL MEDICINE

## 2025-06-07 PROCEDURE — 97163 PT EVAL HIGH COMPLEX 45 MIN: CPT

## 2025-06-07 PROCEDURE — 93010 ELECTROCARDIOGRAM REPORT: CPT | Performed by: INTERNAL MEDICINE

## 2025-06-07 RX ADMIN — Medication 500 MCG: at 08:36

## 2025-06-07 RX ADMIN — FUROSEMIDE 20 MG: 10 INJECTION, SOLUTION INTRAVENOUS at 08:36

## 2025-06-07 RX ADMIN — RIVAROXABAN 15 MG: 15 TABLET, FILM COATED ORAL at 08:36

## 2025-06-07 RX ADMIN — METOPROLOL SUCCINATE 12.5 MG: 25 TABLET, EXTENDED RELEASE ORAL at 08:36

## 2025-06-07 RX ADMIN — Medication 6 MG: at 22:00

## 2025-06-07 RX ADMIN — FUROSEMIDE 20 MG: 10 INJECTION, SOLUTION INTRAVENOUS at 16:52

## 2025-06-07 RX ADMIN — ATORVASTATIN CALCIUM 20 MG: 20 TABLET, FILM COATED ORAL at 16:52

## 2025-06-07 RX ADMIN — IRON SUCROSE 300 MG: 20 INJECTION, SOLUTION INTRAVENOUS at 11:56

## 2025-06-07 RX ADMIN — FERROUS SULFATE TAB 325 MG (65 MG ELEMENTAL FE) 325 MG: 325 (65 FE) TAB at 08:36

## 2025-06-07 RX ADMIN — POTASSIUM CHLORIDE 20 MEQ: 1500 TABLET, EXTENDED RELEASE ORAL at 08:36

## 2025-06-07 NOTE — PLAN OF CARE
Problem: PHYSICAL THERAPY ADULT  Goal: Performs mobility at highest level of function for planned discharge setting.  See evaluation for individualized goals.  Description: Treatment/Interventions: Functional transfer training, LE strengthening/ROM, Therapeutic exercise, Cognitive reorientation, Patient/family training, Equipment eval/education, Bed mobility, Gait training, Spoke to nursing     See flowsheet documentation for full assessment, interventions and recommendations.  Note: Prognosis: Good  Problem List: Impaired balance, Decreased mobility, Decreased cognition, Impaired judgement, Decreased safety awareness  Assessment: Pt seen for PT evaluation for mobility assessment & discharge needs. Pt admitted 6/5/2025 w/ SOB and recent weight gain, dx Acute on chronic combined systolic and diastolic CHF (congestive heart failure) (HCC). During PT IE, pt completes bed mobility in hospital bed with S, transfers STS with RW and S, and ambulates 38ft in room with RW and S (limited distance due to lunch tray arrival). Pt displays above outlined functional impairments & limitations, and presents close to her baseline level of functional mobility. The AM-PAC & Barthel Index outcome tools were used to assist in determining pt safety w/ mobility/self care & appropriate d/c recommendations, see above for scores. Pt is at risk of falls d/t multiple comorbidities, impaired balance, impaired cognition, impaired insight/safety awareness, use of ambulatory aid, advanced age, acuity of medical illness, and ongoing medical treatment of primary dx. Pt's clinical presentation is currently unstable/unpredictable as seen in pt's presentation of varying levels of cognitive performance, increased fall risk, new onset of impairment of functional mobility, and new onset of weakness. Pt will benefit from continued PT services in order to address impairments, decrease risk of falls, maximize independence w/ fnxl mobility, & ensure safety  w/ mobility for transition to next level of care. Based on pt presentation & impairments, pt would most appropriately benefit from Level III (minimal PT intensity) resources upon d/c.      Rehab Resource Intensity Level, PT: III (Minimum Resource Intensity)    See flowsheet documentation for full assessment.

## 2025-06-07 NOTE — HOSPITAL COURSE
84 y.o. female with a PMHx CAD s/p CABG x4 and LAMINE, PVD, CHF, CKD III,A-fib, HTN, kidney stones, dementia, tachybradycardia syndrome s/p MDT-dual chamber PPM who presents from [facility] due to complaints of shortness of breath and recent weight gain of approximately 17 pounds within less than a month.  Admitted for CHF exacerbation likely secondary to dietary indiscretion being diuresed with Lasix 20 mg BID.  Patient making good urine output and has been saturating well on room air.  An echo was ordered that revealed ***.  Patient's QTc upon arrival was 560 so her Lexapro and amiodarone were held.  Upon repeat ECG QTc is 475.     ***May need increase and Bumex dose upon discharge

## 2025-06-07 NOTE — PHYSICAL THERAPY NOTE
"   PHYSICAL THERAPY EVALUATION  DATE: 06/07/25  TIME: 9814-8163    NAME:  Verito Fallon  AGE:   84 y.o.  Mrn:   [id number]  Length Of Stay: 2    ADMIT DX:  SOB (shortness of breath) [R06.02]  Anemia [D64.9]  CHF exacerbation (HCC) [I50.9]    Past Medical History[1]  Past Surgical History[2]    Performed at least 2 patient identifiers during session: Name, Birthday, ID bracelet, and Epic photo     06/07/25 1224   PT Last Visit   PT Visit Date 06/07/25   Note Type   Note type Evaluation   Pain Assessment   Pain Assessment Tool 0-10   Pain Score No Pain   Restrictions/Precautions   Weight Bearing Precautions Per Order No   Other Precautions Cognitive;Chair Alarm;Bed Alarm;Multiple lines;Fall Risk   Home Living   Type of Home Assisted living  ([facility], pt unable to recall which unit she resides in)   Home Layout Access   Home Equipment Walker   Prior Function   Level of Bond Independent with ADLs;Independent with functional mobility;Needs assistance with IADLS   Lives With Facility staff   Receives Help From Family;Personal care attendant   IADLs Family/Friend/Other provides transportation;Family/Friend/Other provides meals;Family/Friend/Other provides medication management   Falls in the last 6 months 0  (pt denies recent falls)   Vocational Retired   Comments Pt is a very poor historian, unable to provide accurate information regarding PLOF and home setup. Per EMR; pt coming from [facility], unable to determine which unit she resides in. Pt reports ambulating independently with RW, completes all ADLs independently but states she \"has help if needed\".   General   Additional Pertinent History Pt is an 84 yr old female admitted 6/5/25 with SOB and recent weight gain. Dx: acute on chronic CHF. PMH includes: CHF, dementia, SSS s/p pacemaker, Afib, CAD s/p CABG, CKD.   Family/Caregiver Present No   Cognition   Overall Cognitive Status Impaired   Arousal/Participation Cooperative   Orientation " "Level Oriented to person;Oriented to place;Disoriented to time;Disoriented to situation   Memory Decreased short term memory;Decreased recall of recent events;Decreased recall of precautions   Following Commands Follows one step commands without difficulty   Subjective   Subjective \"My daughter is going to be so mad at me because I'm causing so much trouble.\" \"I don't even know how I got here or why i'm here.\"   RUE Assessment   RUE Assessment WFL   LUE Assessment   LUE Assessment WFL   RLE Assessment   RLE Assessment WFL   LLE Assessment   LLE Assessment WFL   Vision-Basic Assessment   Current Vision Wears glasses all the time   Patient Visual Report Other (Comment)  (no acute visual changes)   Coordination   Movements are Fluid and Coordinated 1   Sensation WFL   Light Touch   RLE Light Touch Grossly intact   LLE Light Touch Grossly intact   Proprioception   RLE Proprioception Grossly intact   LLE Proprioception Grossly Intact   Bed Mobility   Supine to Sit 5  Supervision   Additional items Assist x 1;HOB elevated;Bedrails;Increased time required;Verbal cues   Sit to Supine   (NT as pt was left seated OOB in recliner chair with alarm engaged and needs in reach)   Additional Comments Pt with overall good sitting balance at EOB, able to scoot fwd for optimal positioning with S and increased effort. Denies lightheadedness or dizziness.   Transfers   Sit to Stand 5  Supervision   Additional items Assist x 1;Increased time required;Verbal cues  (RW)   Stand to Sit 5  Supervision   Additional items Assist x 1;Armrests;Increased time required;Verbal cues  (RW)   Stand pivot 4  Minimal assistance  (CGA)   Additional items Assist x 1;Increased time required;Verbal cues  (RW)   Additional Comments Cues for RW management and surface proximity during approach to target surface.   Ambulation/Elevation   Gait pattern Decreased foot clearance;Forward Flexion;Short stride;Step through pattern   Gait Assistance 5  Supervision "   Additional items Assist x 1;Verbal cues   Assistive Device Rolling walker   Distance 38ft  (pt declines further ambulation at this time d/t lunch tray arrival and pt reporting being very hungry. based on pt presentation and clinical judgement, pt anticipated to tolerate additional distance ambulation well.)   Stair Management Assistance Not tested   Balance   Static Sitting Good   Dynamic Sitting Fair   Static Standing Fair  (w/ RW)   Dynamic Standing Fair  (w/ RW)   Ambulatory Fair  (w/ RW)   Endurance Deficit   Endurance Deficit No   Activity Tolerance   Activity Tolerance Patient tolerated treatment well   Medical Staff Made Aware Spoke with RN, OT   Assessment   Prognosis Good   Problem List Impaired balance;Decreased mobility;Decreased cognition;Impaired judgement;Decreased safety awareness   Assessment Pt seen for PT evaluation for mobility assessment & discharge needs. Pt admitted 6/5/2025 w/ SOB and recent weight gain, dx Acute on chronic combined systolic and diastolic CHF (congestive heart failure) (HCC). During PT IE, pt completes bed mobility in hospital bed with S, transfers STS with RW and S, and ambulates 38ft in room with RW and S (limited distance due to lunch tray arrival). Pt displays above outlined functional impairments & limitations, and presents close to her baseline level of functional mobility. The AM-PAC & Barthel Index outcome tools were used to assist in determining pt safety w/ mobility/self care & appropriate d/c recommendations, see above for scores. Pt is at risk of falls d/t multiple comorbidities, impaired balance, impaired cognition, impaired insight/safety awareness, use of ambulatory aid, advanced age, acuity of medical illness, and ongoing medical treatment of primary dx. Pt's clinical presentation is currently unstable/unpredictable as seen in pt's presentation of varying levels of cognitive performance, increased fall risk, new onset of impairment of functional mobility, and  "new onset of weakness. Pt will benefit from continued PT services in order to address impairments, decrease risk of falls, maximize independence w/ fnxl mobility, & ensure safety w/ mobility for transition to next level of care. Based on pt presentation & impairments, pt would most appropriately benefit from Level III (minimal PT intensity) resources upon d/c.   Goals   Patient Goals \"to get in touch with my daughter\"   [facility] Expiration Date 06/21/25   Short Term Goal #1 Pt will: complete all bed mobility independently in flat bed in order to promote increased OOB functional mobility and simulate home environment; complete all transfers with RW at MINISTERIO level in order to increase safety with functional mobility; ambulate >100ft with RW and S in order to increase safety with in facility distance functional mobility; demonstrate understanding and independence with LE strengthening HEP; improve ambulatory balance to >/= good grade with RW in order to promote safety and increased independence with mobility; tolerate >3hrs OOB in upright position, in order to improve muscular endurance and respiratory status; improve AM-PAC score to >/= 23/24 in order to increase independence with mobility and decrease burden of care; improve Barthel Index score to >/= 55/100 in order to increase independence and decrease risk of falls.   PT Treatment Day 0   Plan   Treatment/Interventions Functional transfer training;LE strengthening/ROM;Therapeutic exercise;Cognitive reorientation;Patient/family training;Equipment eval/education;Bed mobility;Gait training;Spoke to nursing   PT Frequency 2-3x/wk   Discharge Recommendation   Rehab Resource Intensity Level, PT III (Minimum Resource Intensity)   AM-PAC Basic Mobility Inpatient   Turning in Flat Bed Without Bedrails 4   Lying on Back to Sitting on Edge of Flat Bed Without Bedrails 3   Moving Bed to Chair 3   Standing Up From Chair Using Arms 3   Walk in Room 3   Climb 3-5 Stairs With Railing 2 "   Basic Mobility Inpatient Raw Score 18   Basic Mobility Standardized Score 41.05   [facility] Highest Level Of Mobility   -HLM Goal 6: Walk 10 steps or more   JH-HLM Achieved 7: Walk 25 feet or more   Modified Lily Scale   Modified Lily Scale 3   Barthel Index   Feeding 10   Bathing 0   Grooming Score 0   Dressing Score 5   Bladder Score 5   Bowels Score 10   Toilet Use Score 5   Transfers (Bed/Chair) Score 10   Mobility (Level Surface) Score 0   Stairs Score 0   Barthel Index Score 45   End of Consult   Patient Position at End of Consult Bedside chair;Bed/Chair alarm activated;All needs within reach     Based on patient's [facility] Highest Level of Mobility scores today, patient currently has a goal of -[facility] Levels: 7: WALK 25 FEET OR MORE, to be completed with RN staffing each shift, in order to improve overall activity tolerance and mobility, combat hospital related deconditioning, and maximize outcomes for d/c from the acute care setting.     The patient's AM-PAC Basic Mobility Inpatient Short Form Raw Score is 18. A Raw score of greater than 16 suggests the patient may benefit from discharge to home. Please also refer to the recommendation of the Physical Therapist for safe discharge planning.      Holly Dumont, PT, DPT   Available via [employer]  NPI # [id number]  [place] License - [id number]  6/7/2025          [1]   Past Medical History:  Diagnosis Date    Abnormal liver enzymes 01/21/2021    Acute (reversible) ischemia of small intestine, extent unspecified (HCC) 01/14/2022    Anal fissure     Cardiac disorder     Closed fracture of fifth metacarpal bone of right hand 11/13/2023    Closed nondisplaced fracture of fifth metacarpal bone of right hand, unspecified portion of metacarpal, initial encounter 11/17/2023    Cognitive changes 12/23/2020    Edema of left upper extremity 01/22/2021    Esophageal reflux 12/15/2023    Esophageal reflux 12/15/2023    Esophagitis, reflux     Fall 10/04/2022     Hemorrhoids     Hepatic hemangioma     Last Assessed: 1/13/2015    Herpes zoster     History of colonic polyps     Hypertension     Ischemic colitis (HCC)     Lumbar herniated disc     Malignant neoplasm without specification of site (HCC)     Memory loss 05/03/2021    Multiple contusions 11/12/2023    Nephrolithiasis     L. 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    Urinary retention 01/22/2021    Uterovaginal prolapse     Grade I-II    Wound of right leg 12/22/2022   [2]   Past Surgical History:  Procedure Laterality Date    APPENDECTOMY  1947    CARDIAC PACEMAKER PLACEMENT  01/2021    CARDIAC SURGERY      CABG    CATARACT EXTRACTION Bilateral     COLONOSCOPY  2012    Fiberoptic    COLONOSCOPY      Resolved: 2006 - 2012 5 year f/u    CORONARY ANGIOPLASTY WITH STENT PLACEMENT      CORONARY ARTERY BYPASS GRAFT      Resolved: 2012    ESOPHAGOGASTRODUODENOSCOPY  2012    Diagnostic    HEMORROIDECTOMY      KNEE SURGERY      LITHOTRIPSY      Renal    MALIGNANT SKIN LESION EXCISION      Face; Resolved: 2004    IA ESOPHAGOGASTRODUODENOSCOPY TRANSORAL DIAGNOSTIC N/A 4/13/2016    Procedure: EGD AND COLONOSCOPY;  Surgeon: Evelin Bone MD;  Location: [facility];  Service: Gastroenterology    RENAL ARTERY STENT      SKIN LESION EXCISION      Scalp    SOFT TISSUE TUMOR RESECTION      Shoulder; Resolved: 1995    SPLIT THICKNESS SKIN GRAFT Right 12/14/2023    Procedure: SKIN GRAFT SPLIT THICKNESS (STSG)  EXTREMITY; VAC application;  Surgeon: Ap Brito DO;  Location: [facility];  Service: General    THROMBOLYSIS      Postoperative Thrombolysis PTCA    TONSILLECTOMY      Resolved: 1944    WOUND DEBRIDEMENT Right  12/8/2023    Procedure: DEBRIDEMENT LOWER EXTREMITY (WASH OUT); POSSIBLE VAC APPLICATION;  Surgeon: Abhijeet Davies MD;  Location: [facility];  Service: General

## 2025-06-07 NOTE — PROGRESS NOTES
Progress Note - Hospitalist   Name: Veritoricardo Dominguezrehan 84 y.o. female I MRN: [id number]  Unit/Bed#: S -01 I Date of Admission: 6/5/2025   Date of Service: 6/7/2025 I Hospital Day: 2    Assessment & Plan  Acute on chronic combined systolic and diastolic CHF (congestive heart failure) ([facility])  Wt Readings from Last 3 Encounters:   06/07/25 58.4 kg (128 lb 12.8 oz)   05/27/25 58.9 kg (129 lb 12.8 oz)   05/09/25 54 kg (119 lb)     Patient w/ hx CHF presents from SNF due to 17 pound weight gain over the past month and shortness of breath.   1/2021 echo EF 50%, severe hypokinesis in the distal septum, grade 2 DD, moderate MR  Home regimen: Bumex 0.5 mg daily, metoprolol 12.5 mg daily    Trop 22/22/23  CXR: Upon my read patient has pulmonary edema L>R  Status post Lasix 40 mg IV in the ED      Plan   Hold home Bumex  Lasix 20 mg twice daily IV  6/5 IV Lasix today.  Will reassess volume status tomorrow to see if patient can be transition to p.o. diuretics.  Patient may need an increase in Bumex upon discharge.   F/u echo  Daily standing weights  Strict I's and O's  Fluid restrict 1.8 L, 2 g NA  A-fib ([facility])  ECG upon arrival paced and rate controlled at 84  Home regimen amiodarone 100 mg daily, metoprolol 12.5 mg daily, Xarelto 15 mg daily  Continue home meds  Hold amiodarone due to prolonged Qtc  6/7 f/u Qtc on repeat ECG so amiodarone can be resumed  CAD (coronary artery disease)  CAD status post CABG x 4 (2011) and LAMINE (2021)  Home regimen: Lipitor 20 mg qd, metoprolol 12.5 mg daily.  Not on antiplatelets   Continue home medications  Chronic kidney disease, stage 3b ([facility])  Lab Results   Component Value Date    EGFR 49 06/07/2025    EGFR 45 06/06/2025    EGFR 44 06/05/2025    CREATININE 1.03 06/07/2025    CREATININE 1.11 06/06/2025    CREATININE 1.13 06/05/2025     Baseline 0.8-1.0  Creatinine at baseline  Continue to monitor with BMP as needed  Avoid nephrotoxins  Hyperlipidemia  Lab Results   Component Value Date     CHOLESTEROL 168 12/04/2020    TRIG 88 12/04/2020    HDL 62 12/04/2020    LDLCALC 88 12/04/2020     Home regimen: Lipitor 20 mg qd, continue  Normocytic anemia  Recent Labs     06/05/25  1652 06/06/25  0525 06/07/25  0429   HGB 10.3* 9.7* 10.0*     Lab Results   Component Value Date    IRON 81 06/06/2025    FERRITIN 24 (L) 06/06/2025    TIBC 441 06/06/2025    CONCFE 18 06/06/2025       Hx B12 deficiency   No active bleeding  Unsure of last colonoscopy     Plan   Monitor CBC  Ferritin low at 24.  Will give Venofer 300 mg daily for 2 days    VTE Pharmacologic Prophylaxis: VTE Score: 5 High Risk (Score >/= 5) - Pharmacological DVT Prophylaxis Ordered: rivaroxaban (Xarelto). Sequential Compression Devices Ordered.    Mobility:   Basic Mobility Inpatient Raw Score: 19  JH-HLM Goal: 6: Walk 10 steps or more  JH-HLM Achieved: 6: Walk 10 steps or more  JH-HLM Goal achieved. Continue to encourage appropriate mobility.    Patient Centered Rounds: I performed bedside rounds with nursing staff today.   Discussions with Specialists or Other Care Team Provider: N/a    Education and Discussions with Family / Patient: Attempted to update  (daughter) via phone. Left voicemail.  Melyssa    Current Length of Stay: 2 day(s)  Current Patient Status: Inpatient   Certification Statement: The patient will continue to require additional inpatient hospital stay due to CHF exacerbation requiring diuresis and echo pending   Discharge Plan: Anticipate discharge in 48 hrs to [facility] but likely [facility]    Code Status: Level 3 - DNAR and DNI    Subjective   Patient seen and examined at bedside.  No overnight events.  Patient states she slept well.  Patient eating breakfast w/out difficulty.  She reports being confused this morning and would like to know why she is currently in the hospital.  Discussed the patient's hospital course and current plan of management. denies any shortness of breath, chest pain, abdominal pain,  urinary symptoms at this time.    Objective :  Temp:  [98.9 °F (37.2 °C)] 98.9 °F (37.2 °C)  HR:  [72-77] 72  BP: (112-130)/(58-74) 112/72  Resp:  [17] 17  SpO2:  [89 %-93 %] 91 %  O2 Device: None (Room air)    Body mass index is 26.01 kg/m².     Input and Output Summary (last 24 hours):     Intake/Output Summary (Last 24 hours) at 6/7/2025 1013  Last data filed at 6/7/2025 0501  Gross per 24 hour   Intake 780 ml   Output 550 ml   Net 230 ml       Physical Exam  Vitals and nursing note reviewed.   Constitutional:       Appearance: Normal appearance.   HENT:      Head: Normocephalic and atraumatic.      Mouth/Throat:      Mouth: Mucous membranes are moist.     Eyes:      Extraocular Movements: Extraocular movements intact.      Conjunctiva/sclera: Conjunctivae normal.      Pupils: Pupils are equal, round, and reactive to light.       Cardiovascular:      Rate and Rhythm: Normal rate and regular rhythm.      Comments: Pacemaker noted to left chest wall  Pulmonary:      Effort: Pulmonary effort is normal. No respiratory distress.      Breath sounds: Rales (bases L>R, improving) present. No wheezing.   Abdominal:      General: Bowel sounds are normal. There is no distension.      Palpations: Abdomen is soft.      Tenderness: There is no abdominal tenderness. There is no guarding.     Musculoskeletal:      Right lower leg: Edema present.      Left lower leg: Edema present.      Comments: 2+ pitting edema BLE     Skin:     General: Skin is warm and dry.     Neurological:      General: No focal deficit present.      Mental Status: She is alert.      Comments: Patient pleasantly confused oriented to self and place.   Psychiatric:         Mood and Affect: Mood normal.           Lines/Drains:               Lab Results: I have reviewed the following results:   Results from last 7 days   Lab Units 06/07/25  0429 06/06/25  0525 06/05/25  1652   WBC Thousand/uL 7.39   < > 6.24   HEMOGLOBIN g/dL 10.0*   < > 10.3*   HEMATOCRIT %  34.0*   < > 33.6*   PLATELETS Thousands/uL 244   < > 253   SEGS PCT %  --   --  69   LYMPHO PCT %  --   --  10*   MONO PCT %  --   --  13*   EOS PCT %  --   --  6    < > = values in this interval not displayed.     Results from last 7 days   Lab Units 06/07/25  0429 06/06/25  0525 06/05/25  1652   SODIUM mmol/L 139   < > 139   POTASSIUM mmol/L 3.5   < > 3.9   CHLORIDE mmol/L 104   < > 106   CO2 mmol/L 28   < > 26   BUN mg/dL 19   < > 20   CREATININE mg/dL 1.03   < > 1.13   ANION GAP mmol/L 7   < > 7   CALCIUM mg/dL 8.3*   < > 8.7   ALBUMIN g/dL  --   --  3.7   TOTAL BILIRUBIN mg/dL  --   --  0.91   ALK PHOS U/L  --   --  93   ALT U/L  --   --  31   AST U/L  --   --  22   GLUCOSE RANDOM mg/dL 89   < > 95    < > = values in this interval not displayed.                       Recent Cultures (last 7 days):               Last 24 Hours Medication List:     Current Facility-Administered Medications:     acetaminophen (TYLENOL) tablet 650 mg, Q6H PRN    [Held by provider] amiodarone tablet 100 mg, Daily    atorvastatin (LIPITOR) tablet 20 mg, Daily With Dinner    [Held by provider] bumetanide (BUMEX) tablet 0.5 mg, Daily PRN    cyanocobalamin (VITAMIN B-12) tablet 500 mcg, Daily    [Held by provider] escitalopram (LEXAPRO) tablet 15 mg, Daily    ferrous sulfate tablet 325 mg, Daily With Breakfast    furosemide (LASIX) injection 20 mg, BID (diuretic)    iron sucrose (VENOFER) 300 mg in sodium chloride 0.9 % 250 mL IVPB, Daily    melatonin tablet 6 mg, HS    metoprolol succinate (TOPROL-XL) 24 hr tablet 12.5 mg, Daily    polyethylene glycol (MIRALAX) packet 17 g, Daily PRN    potassium chloride (Klor-Con M20) CR tablet 20 mEq, Daily    rivaroxaban (XARELTO) tablet 15 mg, Daily With Breakfast    Administrative Statements   Today, Patient Was Seen By: Arin Hernández MD      **Please Note: This note may have been constructed using a voice recognition system.**

## 2025-06-08 ENCOUNTER — APPOINTMENT (INPATIENT)
Dept: NON INVASIVE DIAGNOSTICS | Facility: HOSPITAL | Age: 85
End: 2025-06-08
Payer: MEDICARE

## 2025-06-08 PROBLEM — D50.9 IRON DEFICIENCY ANEMIA: Status: ACTIVE | Noted: 2025-06-06

## 2025-06-08 LAB
ANION GAP SERPL CALCULATED.3IONS-SCNC: 1 MMOL/L (ref 4–13)
AORTIC ROOT: 2.7 CM
AORTIC VALVE MEAN VELOCITY: 8 M/S
ATRIAL RATE: 77 BPM
AV AREA BY CONTINUOUS VTI: 1.5 CM2
AV AREA PEAK VELOCITY: 1.4 CM2
AV LVOT MEAN GRADIENT: 1 MMHG
AV LVOT PEAK GRADIENT: 2 MMHG
AV MEAN PRESS GRAD SYS DOP V1V2: 3 MMHG
AV ORIFICE AREA US: 1.46 CM2
AV PEAK GRADIENT: 5 MMHG
AV VELOCITY RATIO: 0.64
AV VMAX SYS DOP: 1.12 M/S
BSA FOR ECHO PROCEDURE: 1.57 M2
BUN SERPL-MCNC: 18 MG/DL (ref 5–25)
CALCIUM SERPL-MCNC: 8.1 MG/DL (ref 8.4–10.2)
CHLORIDE SERPL-SCNC: 108 MMOL/L (ref 96–108)
CO2 SERPL-SCNC: 27 MMOL/L (ref 21–32)
CREAT SERPL-MCNC: 0.99 MG/DL (ref 0.6–1.3)
DOP CALC AO VTI: 25.53 CM
DOP CALC LVOT AREA: 2.27 CM2
DOP CALC LVOT CARDIAC INDEX: 1.68 L/MIN/M2
DOP CALC LVOT CARDIAC OUTPUT: 2.56 L/MIN
DOP CALC LVOT DIAMETER: 1.7 CM
DOP CALC LVOT PEAK VEL VTI: 16.41 CM
DOP CALC LVOT PEAK VEL: 0.69 M/S
DOP CALC LVOT STROKE INDEX: 23 ML/M2
DOP CALC LVOT STROKE VOLUME: 37.23
ERYTHROCYTE [DISTWIDTH] IN BLOOD BY AUTOMATED COUNT: 17.2 % (ref 11.6–15.1)
FRACTIONAL SHORTENING: 43 (ref 28–44)
GFR SERPL CREATININE-BSD FRML MDRD: 52 ML/MIN/1.73SQ M
GLUCOSE SERPL-MCNC: 83 MG/DL (ref 65–140)
HCT VFR BLD AUTO: 31.1 % (ref 34.8–46.1)
HGB BLD-MCNC: 9.4 G/DL (ref 11.5–15.4)
INTERVENTRICULAR SEPTUM IN DIASTOLE (PARASTERNAL SHORT AXIS VIEW): 1.2 CM
INTERVENTRICULAR SEPTUM: 1.2 CM (ref 0.6–1.1)
LAAS-AP2: 17.8 CM2
LAAS-AP4: 13.6 CM2
LEFT ATRIUM SIZE: 4.2 CM
LEFT ATRIUM VOLUME (MOD BIPLANE): 40 ML
LEFT ATRIUM VOLUME INDEX (MOD BIPLANE): 26.3 ML/M2
LEFT INTERNAL DIMENSION IN SYSTOLE: 2.1 CM (ref 2.1–4)
LEFT VENTRICULAR INTERNAL DIMENSION IN DIASTOLE: 3.7 CM (ref 3.5–6)
LEFT VENTRICULAR POSTERIOR WALL IN END DIASTOLE: 1.1 CM
LEFT VENTRICULAR STROKE VOLUME: 45 ML
LV EF US.2D.A4C+ESTIMATED: 51 %
LVSV (TEICH): 45 ML
MCH RBC QN AUTO: 27.6 PG (ref 26.8–34.3)
MCHC RBC AUTO-ENTMCNC: 30.2 G/DL (ref 31.4–37.4)
MCV RBC AUTO: 92 FL (ref 82–98)
PLATELET # BLD AUTO: 204 THOUSANDS/UL (ref 149–390)
PMV BLD AUTO: 10.4 FL (ref 8.9–12.7)
POTASSIUM SERPL-SCNC: 3.7 MMOL/L (ref 3.5–5.3)
QRS AXIS: -77 DEGREES
QRSD INTERVAL: 138 MS
QT INTERVAL: 460 MS
QTC INTERVAL: 521 MS
RA PRESSURE ESTIMATED: 15 MMHG
RBC # BLD AUTO: 3.4 MILLION/UL (ref 3.81–5.12)
RIGHT ATRIAL 2D VOLUME: 35 ML
RIGHT ATRIUM AREA SYSTOLE A4C: 15.2 CM2
RIGHT VENTRICLE ID DIMENSION: 3.1 CM
RV PSP: 90 MMHG
SL CV LEFT ATRIUM LENGTH A2C: 5.5 CM
SL CV LV EF: 55
SL CV PED ECHO LEFT VENTRICLE DIASTOLIC VOLUME (MOD BIPLANE) 2D: 60 ML
SL CV PED ECHO LEFT VENTRICLE SYSTOLIC VOLUME (MOD BIPLANE) 2D: 15 ML
SODIUM SERPL-SCNC: 136 MMOL/L (ref 135–147)
T WAVE AXIS: 208 DEGREES
TR MAX PG: 75 MMHG
TR PEAK VELOCITY: 4.3 M/S
TRICUSPID ANNULAR PLANE SYSTOLIC EXCURSION: 0.8 CM
TRICUSPID VALVE PEAK REGURGITATION VELOCITY: 4.32 M/S
VENTRICULAR RATE: 77 BPM
WBC # BLD AUTO: 5.36 THOUSAND/UL (ref 4.31–10.16)

## 2025-06-08 PROCEDURE — 85027 COMPLETE CBC AUTOMATED: CPT

## 2025-06-08 PROCEDURE — 97167 OT EVAL HIGH COMPLEX 60 MIN: CPT

## 2025-06-08 PROCEDURE — 99232 SBSQ HOSP IP/OBS MODERATE 35: CPT | Performed by: INTERNAL MEDICINE

## 2025-06-08 PROCEDURE — 80048 BASIC METABOLIC PNL TOTAL CA: CPT

## 2025-06-08 PROCEDURE — 93306 TTE W/DOPPLER COMPLETE: CPT | Performed by: INTERNAL MEDICINE

## 2025-06-08 PROCEDURE — 93306 TTE W/DOPPLER COMPLETE: CPT

## 2025-06-08 PROCEDURE — 93010 ELECTROCARDIOGRAM REPORT: CPT | Performed by: INTERNAL MEDICINE

## 2025-06-08 PROCEDURE — 93005 ELECTROCARDIOGRAM TRACING: CPT

## 2025-06-08 RX ORDER — FERROUS SULFATE 325(65) MG
325 TABLET ORAL EVERY OTHER DAY
Status: DISCONTINUED | OUTPATIENT
Start: 2025-06-09 | End: 2025-06-09 | Stop reason: HOSPADM

## 2025-06-08 RX ADMIN — POTASSIUM CHLORIDE 20 MEQ: 1500 TABLET, EXTENDED RELEASE ORAL at 08:11

## 2025-06-08 RX ADMIN — METOPROLOL SUCCINATE 12.5 MG: 25 TABLET, EXTENDED RELEASE ORAL at 08:10

## 2025-06-08 RX ADMIN — Medication 500 MCG: at 08:11

## 2025-06-08 RX ADMIN — IRON SUCROSE 300 MG: 20 INJECTION, SOLUTION INTRAVENOUS at 11:12

## 2025-06-08 RX ADMIN — FUROSEMIDE 20 MG: 10 INJECTION, SOLUTION INTRAVENOUS at 16:32

## 2025-06-08 RX ADMIN — RIVAROXABAN 15 MG: 15 TABLET, FILM COATED ORAL at 08:11

## 2025-06-08 RX ADMIN — FUROSEMIDE 20 MG: 10 INJECTION, SOLUTION INTRAVENOUS at 08:11

## 2025-06-08 RX ADMIN — ATORVASTATIN CALCIUM 20 MG: 20 TABLET, FILM COATED ORAL at 16:32

## 2025-06-08 RX ADMIN — FERROUS SULFATE TAB 325 MG (65 MG ELEMENTAL FE) 325 MG: 325 (65 FE) TAB at 08:11

## 2025-06-08 RX ADMIN — Medication 6 MG: at 21:01

## 2025-06-08 NOTE — OCCUPATIONAL THERAPY NOTE
Occupational Therapy Evaluation     Patient Name: Verito Fallon  Today's Date: 6/8/2025  Problem List  Principal Problem:    Acute on chronic combined systolic and diastolic CHF (congestive heart failure) ([facility])  Active Problems:    A-fib (HCC)    Hyperlipidemia    CAD (coronary artery disease)    Chronic kidney disease, stage 3b (HCC)    Iron deficiency anemia    Past Medical History  Past Medical History[1]  Past Surgical History  Past Surgical History[2]      06/08/25 1016   OT Last Visit   OT Visit Date 06/08/25   Note Type   Note type Evaluation   Pain Assessment   Pain Assessment Tool 0-10   Pain Score No Pain   Restrictions/Precautions   Weight Bearing Precautions Per Order No   Other Precautions Cognitive;Chair Alarm;Bed Alarm;Fall Risk;Telemetry   Home Living   Type of Home Assisted living  ([facility])   Home Layout Access   Home Equipment Walker   Prior Function   Level of [place] Independent with ADLs;Independent with functional mobility;Needs assistance with IADLS   Lives With Facility staff   Receives Help From Family;Personal care attendant   IADLs Family/Friend/Other provides transportation;Family/Friend/Other provides meals;Family/Friend/Other provides medication management   Falls in the last 6 months 0  (pt denies falls)   Vocational Retired   Comments pt is a poor historian, unable to provide accurate PLOF and home setup, obtained from EMR.   Lifestyle   Autonomy pta per EMR, pt was (I) w ADLs and (A) IADLs, lives at [facility], use of RW   Reciprocal Relationships her daughter   Service to Others retired   Intrinsic Gratification pt did not identify at this time   General   Additional Pertinent History CHF, dementia, depression, ambulatory dysfunction, hx of falls   Family/Caregiver Present No   Additional General Comments Pt was pleasantly confused and cooperative throughout session   ADL   Where Assessed Chair   Grooming Assistance 5  Supervision/Setup   Grooming  Deficit Wash/dry face;Brushing hair   UB Dressing Assistance 5  Supervision/Setup   UB Dressing Deficit Thread RUE;Thread LUE;Pull around back;Verbal cueing;Increased time to complete   LB Dressing Assistance 5  Supervision/Setup   LB Dressing Deficit Don/doff R sock;Don/doff L sock;Increased time to complete   Additional Comments pt benefits from verbal cues for redirection to complete task   Bed Mobility   Additional Comments pt greeted OOB in recliner chair   Transfers   Sit to Stand 5  Supervision   Additional items Assist x 1;Increased time required;Verbal cues  (rw)   Stand to Sit 5  Supervision   Additional items Assist x 1;Armrests;Increased time required  (RW)   Additional Comments cues for RW managment throughout session   Functional Mobility   Functional Mobility 5  Supervision   Additional Comments supervision w RW, use of verbal cues for sustained attention d/t impaired cognition   Additional items Rolling walker   Balance   Static Sitting Good   Dynamic Sitting Fair   Static Standing Fair   Dynamic Standing Fair   Ambulatory Fair   Activity Tolerance   Activity Tolerance Patient tolerated treatment well   Medical Staff Made Aware   (spoke w RN)   RUE Assessment   RUE Assessment WFL   LUE Assessment   LUE Assessment WFL   Vision-Basic Assessment   Current Vision Wears glasses all the time   Cognition   Overall Cognitive Status Impaired   Arousal/Participation Cooperative;Alert   Attention Attends with cues to redirect   Orientation Level Oriented to person;Oriented to place;Disoriented to time;Disoriented to situation   Memory Decreased short term memory;Decreased recall of recent events;Decreased recall of precautions   Following Commands Follows one step commands without difficulty   Assessment   Limitation Decreased ADL status;Decreased cognition;Decreased endurance;Decreased self-care trans;Decreased high-level ADLs   Prognosis Good   Assessment Pt is a 84 y.o. female seen for OT evaluation s/p  admission to [facility] on 6/5/2025 due to increased Shortness of breath and weight gain. Diagnosed with Acute on chronic combined systolic and diastolic CHF (congestive heart failure) (HCC). Personal and env factors supporting pt at time of IE include age, accessible home environment, and FFSU. Personal and env factors inhibiting engagement in occupations include difficulty completing ADLs and difficulty completing IADLs. Performance deficits that affect the pt’s occupational performance can be seen above. Due to pt's current functional limitations and medical complications pt is functioning below baseline. Pt would benefit from continued skilled OT treatment in order to maximize safety, independence and overall performance with ADLs, IADLs, functional mobility, functional transfers, and cognition in order to achieve highest level of function.   Goals   Patient Goals to get some clean underwear from my daughter   LTG Time Frame 7-10   Long Term Goal see below   Plan   Treatment Interventions ADL retraining;Functional transfer training;Cognitive reorientation;Patient/family training;Compensatory technique education;Continued evaluation;Energy conservation;Activityengagement   Goal Expiration Date 06/18/25   OT Treatment Day 0   OT Frequency 2-3x/wk   Discharge Recommendation   Rehab Resource Intensity Level, OT III (Minimum Resource Intensity)  (return to facility)   Additional Comments  The patient's raw score on the -PAC Daily Activity Inpatient Short Form is 19. A raw score of greater than or equal to 19 suggests the patient may benefit from discharge to home. Please refer to the recommendation of the Occupational Therapist for safe discharge planning.   AM-PAC Daily Activity Inpatient   Lower Body Dressing 3   Bathing 3   Toileting 3   Upper Body Dressing 3   Grooming 3   Eating 4   Daily Activity Raw Score 19   Daily Activity Standardized Score (Calc for Raw Score >=11) 40.22   AM-PAC Applied Cognition Inpatient    Following a Speech/Presentation 3   Understanding Ordinary Conversation 3   Taking Medications 2   Remembering Where Things Are Placed or Put Away 2   Remembering List of 4-5 Errands 2   Taking Care of Complicated Tasks 1   Applied Cognition Raw Score 13   Applied Cognition Standardized Score 30.46   End of Consult   Education Provided Yes   Patient Position at End of Consult Bedside chair;Bed/Chair alarm activated;All needs within reach   Nurse Communication Nurse aware of consult     GOALS    Pt will improve activity tolerance to G for min 30 min txment sessions for increase engagement in functional tasks    Pt will complete bed mobility at a Mod I level w/ G balance/safety demonstrated to decrease caregiver assistance required     Pt will complete UB dressing/self care w/ mod I using adaptive device and DME as needed     Pt will complete LB dressing/self care w/ mod I using adaptive device and DME as needed    Pt will complete toileting w/ mod I w/ G hygiene/thoroughness using DME as needed    Pt will improve functional transfers to Mod I on/off all surfaces using DME as needed w/ G balance/safety     Pt will improve functional mobility during ADL/IADL/leisure tasks to Mod I using DME as needed w/ G balance/safety       The patient's raw score on the -PAC Daily Activity Inpatient Short Form is 19. A raw score of greater than or equal to 19 suggests the patient may benefit from discharge to home. HOWEVER please refer to the recommendation of the Occupational Therapist for safe discharge planning.    WALLY Collins, OTR/L            [1]   Past Medical History:  Diagnosis Date    Abnormal liver enzymes 01/21/2021    Acute (reversible) ischemia of small intestine, extent unspecified (HCC) 01/14/2022    Anal fissure     Cardiac disorder     Closed fracture of fifth metacarpal bone of right hand 11/13/2023    Closed nondisplaced fracture of fifth metacarpal bone of right hand, unspecified portion of  metacarpal, initial encounter 11/17/2023    Cognitive changes 12/23/2020    Edema of left upper extremity 01/22/2021    Esophageal reflux 12/15/2023    Esophageal reflux 12/15/2023    Esophagitis, reflux     Fall 10/04/2022    Hemorrhoids     Hepatic hemangioma     Last Assessed: 1/13/2015    Herpes zoster     History of colonic polyps     Hypertension     Ischemic colitis (HCC)     Lumbar herniated disc     Malignant neoplasm without specification of site (HCC)     Memory loss 05/03/2021    Multiple contusions 11/12/2023    Nephrolithiasis     L. 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    Urinary retention 01/22/2021    Uterovaginal prolapse     Grade I-II    Wound of right leg 12/22/2022   [2]   Past Surgical History:  Procedure Laterality Date    APPENDECTOMY  1947    CARDIAC PACEMAKER PLACEMENT  01/2021    CARDIAC SURGERY      CABG    CATARACT EXTRACTION Bilateral     COLONOSCOPY  2012    Fiberoptic    COLONOSCOPY      Resolved: 2006 - 2012 5 year f/u    CORONARY ANGIOPLASTY WITH STENT PLACEMENT      CORONARY ARTERY BYPASS GRAFT      Resolved: 2012    ESOPHAGOGASTRODUODENOSCOPY  2012    Diagnostic    HEMORROIDECTOMY      KNEE SURGERY      LITHOTRIPSY      Renal    MALIGNANT SKIN LESION EXCISION      Face; Resolved: 2004    CT ESOPHAGOGASTRODUODENOSCOPY TRANSORAL DIAGNOSTIC N/A 4/13/2016    Procedure: EGD AND COLONOSCOPY;  Surgeon: Evelin Bone MD;  [facility]: [facility];  Service: Gastroenterology    RENAL ARTERY STENT      SKIN LESION EXCISION      Scalp    SOFT TISSUE TUMOR RESECTION      Shoulder; Resolved: 1995    SPLIT THICKNESS SKIN GRAFT Right 12/14/2023    Procedure: SKIN GRAFT SPLIT THICKNESS (STSG)   EXTREMITY; VAC application;  Surgeon: Ap Brito DO;  Location: [facility];  Service: General    THROMBOLYSIS      Postoperative Thrombolysis PTCA    TONSILLECTOMY      Resolved: 1944    WOUND DEBRIDEMENT Right 12/8/2023    Procedure: DEBRIDEMENT LOWER EXTREMITY (WASH OUT); POSSIBLE VAC APPLICATION;  Surgeon: Abhijeet Davies MD;  Location: [facility];  Service: General

## 2025-06-08 NOTE — PLAN OF CARE
Problem: OCCUPATIONAL THERAPY ADULT  Goal: Performs self-care activities at highest level of function for planned discharge setting.  See evaluation for individualized goals.  Description: Treatment Interventions: ADL retraining, Functional transfer training, Cognitive reorientation, Patient/family training, Compensatory technique education, Continued evaluation, Energy conservation, Activityengagement          See flowsheet documentation for full assessment, interventions and recommendations.   Note: Limitation: Decreased ADL status, Decreased cognition, Decreased endurance, Decreased self-care trans, Decreased high-level ADLs  Prognosis: Good  Assessment: Pt is a 84 y.o. female seen for OT evaluation s/p admission to [facility] on 6/5/2025 due to increased Shortness of breath and weight gain. Diagnosed with Acute on chronic combined systolic and diastolic CHF (congestive heart failure) (HCC). Personal and env factors supporting pt at time of IE include age, accessible home environment, and FFSU. Personal and env factors inhibiting engagement in occupations include difficulty completing ADLs and difficulty completing IADLs. Performance deficits that affect the pt’s occupational performance can be seen above. Due to pt's current functional limitations and medical complications pt is functioning below baseline. Pt would benefit from continued skilled OT treatment in order to maximize safety, independence and overall performance with ADLs, IADLs, functional mobility, functional transfers, and cognition in order to achieve highest level of function.     Rehab Resource Intensity Level, OT: III (Minimum Resource Intensity) (return to facility)

## 2025-06-08 NOTE — PROGRESS NOTES
Progress Note - Hospitalist   Name: Veritoricardo Dominguezrehan 84 y.o. female I MRN: [id number]  Unit/Bed#: S -01 I Date of Admission: 6/5/2025   Date of Service: 6/8/2025 I Hospital Day: 3     Assessment & Plan  Acute on chronic combined systolic and diastolic CHF (congestive heart failure) ([facility])  Wt Readings from Last 3 Encounters:   06/08/25 62.1 kg (137 lb)   05/27/25 58.9 kg (129 lb 12.8 oz)   05/09/25 54 kg (119 lb)     Patient w/ hx CHF presents from SNF due to 17 pound weight gain over the past month and shortness of breath.   1/2021 echo EF 50%, severe hypokinesis in the distal septum, grade 2 DD, moderate MR  Home regimen: Bumex 0.5 mg daily, metoprolol 12.5 mg daily    Trop 22/22/23  CXR: Upon my read patient has pulmonary edema L>R  Status post Lasix 40 mg IV in the ED      Plan   Continue Lasix 20 mg twice daily IV-potential transition to p.o. tomorrow  F/u echo  Daily standing weights  Strict I's and O's  Fluid restrict 1.8 L, 2 g NA  A-fib ([facility])  ECG upon arrival paced and rate controlled at 84  Home regimen amiodarone 100 mg daily, metoprolol 12.5 mg daily, Xarelto 15 mg daily  Continue home meds  Hold amiodarone due to prolonged Qtc  6/8 QTc remains elevated, holding amiodarone and Lexapro  CAD (coronary artery disease)  CAD status post CABG x 4 (2011) and LAMINE (2021)  Home regimen: Lipitor 20 mg qd, metoprolol 12.5 mg daily.  Not on antiplatelets   Continue home medications  Chronic kidney disease, stage 3b ([facility])  Lab Results   Component Value Date    EGFR 52 06/08/2025    EGFR 49 06/07/2025    EGFR 45 06/06/2025    CREATININE 0.99 06/08/2025    CREATININE 1.03 06/07/2025    CREATININE 1.11 06/06/2025     Baseline 0.8-1.0  Creatinine at baseline  Continue to monitor with BMP as needed  Avoid nephrotoxins  Hyperlipidemia  Lab Results   Component Value Date    CHOLESTEROL 168 12/04/2020    TRIG 88 12/04/2020    HDL 62 12/04/2020    LDLCALC 88 12/04/2020     Home regimen: Lipitor 20 mg qd,  continue  Iron deficiency anemia  Recent Labs     06/06/25  0525 06/07/25  0429 06/08/25  0542   HGB 9.7* 10.0* 9.4*     Lab Results   Component Value Date    IRON 81 06/06/2025    FERRITIN 24 (L) 06/06/2025    TIBC 441 06/06/2025    CONCFE 18 06/06/2025       Hx B12 deficiency   No active bleeding  Unsure of last colonoscopy     Plan   Monitor CBC  Ferritin low at 24.  Will give Venofer 300 mg daily for 2 days    VTE Pharmacologic Prophylaxis: VTE Score: 5 High Risk (Score >/= 5) - Pharmacological DVT Prophylaxis Ordered: rivaroxaban (Xarelto). Sequential Compression Devices Ordered.    Mobility:   Basic Mobility Inpatient Raw Score: 18  -HLM Goal: 6: Walk 10 steps or more  JH-HLM Achieved: 6: Walk 10 steps or more      Patient Centered Rounds:    Discussions with Specialists or Other Care Team Provider:     Education and Discussions with Family / Patient: Will update patient's daughter.     Current Length of Stay: 3 day(s)  Current Patient Status: Inpatient   Certification Statement:   Discharge Plan: Anticipate discharge in 24-48 hrs to TBD    Code Status: Level 3 - DNAR and DNI    Subjective   Was seen and examined at bedside this morning.  Patient not in any acute distress.  No other questions or concerns.       Objective :  HR:  [70-77] 76  BP: (106-197)/(47-75) 125/47  Resp:  [17] 17  SpO2:  [87 %-92 %] 90 %  O2 Device: None (Room air)    Body mass index is 27.67 kg/m².     Input and Output Summary (last 24 hours):   No intake or output data in the 24 hours ending 06/08/25 1321    Physical Exam  Vitals and nursing note reviewed.   Constitutional:       Appearance: Normal appearance.   HENT:      Head: Normocephalic and atraumatic.      Mouth/Throat:      Mouth: Mucous membranes are moist.     Eyes:      Extraocular Movements: Extraocular movements intact.      Conjunctiva/sclera: Conjunctivae normal.      Pupils: Pupils are equal, round, and reactive to light.       Cardiovascular:      Rate and Rhythm:  Normal rate and regular rhythm.      Comments: Pacemaker noted to left chest wall  Pulmonary:      Effort: Pulmonary effort is normal. No respiratory distress.      Breath sounds: Rales (bases L>R, improving) present. No wheezing.   Abdominal:      General: Bowel sounds are normal. There is no distension.      Palpations: Abdomen is soft.      Tenderness: There is no abdominal tenderness. There is no guarding.     Musculoskeletal:      Right lower leg: Edema present.      Left lower leg: Edema present.      Comments: Pitting edema overall improving     Skin:     General: Skin is warm and dry.     Neurological:      General: No focal deficit present.      Mental Status: She is alert.      Comments: Patient pleasantly confused oriented to self and place.   Psychiatric:         Mood and Affect: Mood normal.           Lines/Drains:              Lab Results: I have reviewed the following results:   Results from last 7 days   Lab Units 06/08/25  0542 06/06/25  0525 06/05/25  1652   WBC Thousand/uL 5.36   < > 6.24   HEMOGLOBIN g/dL 9.4*   < > 10.3*   HEMATOCRIT % 31.1*   < > 33.6*   PLATELETS Thousands/uL 204   < > 253   SEGS PCT %  --   --  69   LYMPHO PCT %  --   --  10*   MONO PCT %  --   --  13*   EOS PCT %  --   --  6    < > = values in this interval not displayed.     Results from last 7 days   Lab Units 06/08/25  0542 06/06/25  0525 06/05/25  1652   SODIUM mmol/L 136   < > 139   POTASSIUM mmol/L 3.7   < > 3.9   CHLORIDE mmol/L 108   < > 106   CO2 mmol/L 27   < > 26   BUN mg/dL 18   < > 20   CREATININE mg/dL 0.99   < > 1.13   ANION GAP mmol/L 1*   < > 7   CALCIUM mg/dL 8.1*   < > 8.7   ALBUMIN g/dL  --   --  3.7   TOTAL BILIRUBIN mg/dL  --   --  0.91   ALK PHOS U/L  --   --  93   ALT U/L  --   --  31   AST U/L  --   --  22   GLUCOSE RANDOM mg/dL 83   < > 95    < > = values in this interval not displayed.                       Recent Cultures (last 7 days):               Last 24 Hours Medication List:     Current  Facility-Administered Medications:     acetaminophen (TYLENOL) tablet 650 mg, Q6H PRN    [Held by provider] amiodarone tablet 100 mg, Daily    atorvastatin (LIPITOR) tablet 20 mg, Daily With Dinner    [Held by provider] bumetanide (BUMEX) tablet 0.5 mg, Daily PRN    cyanocobalamin (VITAMIN B-12) tablet 500 mcg, Daily    [Held by provider] escitalopram (LEXAPRO) tablet 15 mg, Daily    ferrous sulfate tablet 325 mg, Daily With Breakfast    furosemide (LASIX) injection 20 mg, BID (diuretic)    melatonin tablet 6 mg, HS    metoprolol succinate (TOPROL-XL) 24 hr tablet 12.5 mg, Daily    polyethylene glycol (MIRALAX) packet 17 g, Daily PRN    potassium chloride (Klor-Con M20) CR tablet 20 mEq, Daily    rivaroxaban (XARELTO) tablet 15 mg, Daily With Breakfast    Administrative Statements   Today, Patient Was Seen By: Mira Ramesh MD      **Please Note: This note may have been constructed using a voice recognition system.**

## 2025-06-08 NOTE — PLAN OF CARE
Problem: PAIN - ADULT  Goal: Verbalizes/displays adequate comfort level or baseline comfort level  Description: Interventions:  - Encourage patient to monitor pain and request assistance  - Assess pain using appropriate pain scale  - Administer analgesics as ordered based on type and severity of pain and evaluate response  - Implement non-pharmacological measures as appropriate and evaluate response  - Consider cultural and social influences on pain and pain management  - Notify physician/advanced practitioner if interventions unsuccessful or patient reports new pain  - Educate patient/family on pain management process including their role and importance of  reporting pain   - Provide non-pharmacologic/complimentary pain relief interventions  Outcome: Progressing     Problem: INFECTION - ADULT  Goal: Absence or prevention of progression during hospitalization  Description: INTERVENTIONS:  - Assess and monitor for signs and symptoms of infection  - Monitor lab/diagnostic results  - Monitor all insertion sites, i.e. indwelling lines, tubes, and drains  - Monitor endotracheal if appropriate and nasal secretions for changes in amount and color  - [place] appropriate cooling/warming therapies per order  - Administer medications as ordered  - Instruct and encourage patient and family to use good hand hygiene technique  - Identify and instruct in appropriate isolation precautions for identified infection/condition  Outcome: Progressing  Goal: Absence of fever/infection during neutropenic period  Description: INTERVENTIONS:  - Monitor WBC  - Perform strict hand hygiene  - Limit to healthy visitors only  - No plants, dried, fresh or silk flowers with wagoner in patient room  Outcome: Progressing     Problem: SAFETY ADULT  Goal: Patient will remain free of falls  Description: INTERVENTIONS:  - Educate patient/family on patient safety including physical limitations  - Instruct patient to call for assistance with activity   -  Consider consulting OT/PT to assist with strengthening/mobility based on AM PAC & JH-HLM score  - Consult OT/PT to assist with strengthening/mobility   - Keep Call bell within reach  - Keep bed low and locked with side rails adjusted as appropriate  - Keep care items and personal belongings within reach  - Initiate and maintain comfort rounds  - Make Fall Risk Sign visible to staff  - Offer Toileting every  Hours, in advance of need  - Initiate/Maintain alarm  - Obtain necessary fall risk management equipment:   - Apply yellow socks and bracelet for high fall risk patients  - Consider moving patient to room near nurses station  Outcome: Progressing  Goal: Maintain or return to baseline ADL function  Description: INTERVENTIONS:  -  Assess patient's ability to carry out ADLs; assess patient's baseline for ADL function and identify physical deficits which impact ability to perform ADLs (bathing, care of mouth/teeth, toileting, grooming, dressing, etc.)  - Assess/evaluate cause of self-care deficits   - Assess range of motion  - Assess patient's mobility; develop plan if impaired  - Assess patient's need for assistive devices and provide as appropriate  - Encourage maximum independence but intervene and supervise when necessary  - Involve family in performance of ADLs  - Assess for home care needs following discharge   - Consider OT consult to assist with ADL evaluation and planning for discharge  - Provide patient education as appropriate  - Monitor functional capacity and physical performance, use of AM PAC & JH-HLM   - Monitor gait, balance and fatigue with ambulation    Outcome: Progressing  Goal: Maintains/Returns to pre admission functional level  Description: INTERVENTIONS:  - Perform AM-PAC 6 Click Basic Mobility/ Daily Activity assessment daily.  - Set and communicate daily mobility goal to care team and patient/family/caregiver.   - Collaborate with rehabilitation services on mobility goals if consulted  -  Perform Range of Motion  times a day.  - Reposition patient every  hours.  - Dangle patient  times a day  - Stand patient  times a day  - Ambulate patient  times a day  - Out of bed to chair  times a day   - Out of bed for meals  times a day  - Out of bed for toileting  - Record patient progress and toleration of activity level   Outcome: Progressing     Problem: DISCHARGE PLANNING  Goal: Discharge to home or other facility with appropriate resources  Description: INTERVENTIONS:  - Identify barriers to discharge w/patient and caregiver  - Arrange for needed discharge resources and transportation as appropriate  - Identify discharge learning needs (meds, wound care, etc.)  - Arrange for interpretive services to assist at discharge as needed  - Refer to Case Management Department for coordinating discharge planning if the patient needs post-hospital services based on physician/advanced practitioner order or complex needs related to functional status, cognitive ability, or social support system  Outcome: Progressing     Problem: Knowledge Deficit  Goal: Patient/family/caregiver demonstrates understanding of disease process, treatment plan, medications, and discharge instructions  Description: Complete learning assessment and assess knowledge base.  Interventions:  - Provide teaching at level of understanding  - Provide teaching via preferred learning methods  Outcome: Progressing     Problem: Prexisting or High Potential for Compromised Skin Integrity  Goal: Skin integrity is maintained or improved  Description: INTERVENTIONS:  - Identify patients at risk for skin breakdown  - Assess and monitor skin integrity including under and around medical devices   - Assess and monitor nutrition and hydration status  - Monitor labs  - Assess for incontinence   - Turn and reposition patient  - Assist with mobility/ambulation  - Relieve pressure over solo prominences   - Avoid friction and shearing  - Provide appropriate hygiene  as needed including keeping skin clean and dry  - Evaluate need for skin moisturizer/barrier cream  - Collaborate with interdisciplinary team  - Patient/family teaching  - Consider wound care consult    Assess:  - Review Kumar scale daily  - Clean and moisturize skin every   - Inspect skin when repositioning, toileting, and assisting with ADLS  - Assess under medical devices such as  every   - Assess extremities for adequate circulation and sensation     Bed Management:  - Have minimal linens on bed & keep smooth, unwrinkled  - Change linens as needed when moist or perspiring  - Avoid sitting or lying in one position for more than  hours while in bed?Keep HOB at degrees   - Toileting:  - Offer bedside commode  - Assess for incontinence every   - Use incontinent care products after each incontinent episode such as     Activity:  - Mobilize patient  times a day  - Encourage activity and walks on unit  - Encourage or provide ROM exercises   - Turn and reposition patient every  Hours  - Use appropriate equipment to lift or move patient in bed  - Instruct/ Assist with weight shifting every  when out of bed in chair  - Consider limitation of chair time  hour intervals    Skin Care:  - Avoid use of baby powder, tape, friction and shearing, hot water or constrictive clothing  - Relieve pressure over bony prominences using   - Do not massage red bony areas    Next Steps:  - Teach patient strategies to minimize risks such as   - Consider consults to  interdisciplinary teams such as   Outcome: Progressing     Problem: CARDIOVASCULAR - ADULT  Goal: Maintains optimal cardiac output and hemodynamic stability  Description: INTERVENTIONS:  - Monitor I/O, vital signs and rhythm  - Monitor for S/S and trends of decreased cardiac output  - Administer and titrate ordered vasoactive medications to optimize hemodynamic stability  - Assess quality of pulses, skin color and temperature  - Assess for signs of decreased coronary artery  perfusion  - Instruct patient to report change in severity of symptoms  Outcome: Progressing  Goal: Absence of cardiac dysrhythmias or at baseline rhythm  Description: INTERVENTIONS:  - Continuous cardiac monitoring, vital signs, obtain 12 lead EKG if ordered  - Administer antiarrhythmic and heart rate control medications as ordered  - Monitor electrolytes and administer replacement therapy as ordered  Outcome: Progressing

## 2025-06-08 NOTE — PLAN OF CARE
Problem: PAIN - ADULT  Goal: Verbalizes/displays adequate comfort level or baseline comfort level  Description: Interventions:  - Encourage patient to monitor pain and request assistance  - Assess pain using appropriate pain scale  - Administer analgesics as ordered based on type and severity of pain and evaluate response  - Implement non-pharmacological measures as appropriate and evaluate response  - Consider cultural and social influences on pain and pain management  - Notify physician/advanced practitioner if interventions unsuccessful or patient reports new pain  - Educate patient/family on pain management process including their role and importance of  reporting pain   - Provide non-pharmacologic/complimentary pain relief interventions  6/8/2025 1056 by Kasey Salomon RN  Outcome: Progressing  6/8/2025 1056 by Kasey Salomon RN  Outcome: Progressing     Problem: INFECTION - ADULT  Goal: Absence or prevention of progression during hospitalization  Description: INTERVENTIONS:  - Assess and monitor for signs and symptoms of infection  - Monitor lab/diagnostic results  - Monitor all insertion sites, i.e. indwelling lines, tubes, and drains  - Monitor endotracheal if appropriate and nasal secretions for changes in amount and color  - [place] appropriate cooling/warming therapies per order  - Administer medications as ordered  - Instruct and encourage patient and family to use good hand hygiene technique  - Identify and instruct in appropriate isolation precautions for identified infection/condition  6/8/2025 1056 by Kasey Salomon RN  Outcome: Progressing  6/8/2025 1056 by Kasey Salomon RN  Outcome: Progressing  Goal: Absence of fever/infection during neutropenic period  Description: INTERVENTIONS:  - Monitor WBC  - Perform strict hand hygiene  - Limit to healthy visitors only  - No plants, dried, fresh or silk flowers with wagoner in patient room  6/8/2025 1056 by Kasey Salomon RN  Outcome: Progressing  6/8/2025  1056 by Kasey Salomon RN  Outcome: Progressing     Problem: SAFETY ADULT  Goal: Patient will remain free of falls  Description: INTERVENTIONS:  - Educate patient/family on patient safety including physical limitations  - Instruct patient to call for assistance with activity   - Consider consulting OT/PT to assist with strengthening/mobility based on AM PAC & JH-HLM score  - Consult OT/PT to assist with strengthening/mobility   - Keep Call bell within reach  - Keep bed low and locked with side rails adjusted as appropriate  - Keep care items and personal belongings within reach  - Initiate and maintain comfort rounds  - Make Fall Risk Sign visible to staff  - Offer Toileting every 2 Hours, in advance of need  - Initiate/Maintain alarm  - Obtain necessary fall risk management equipment:   - Apply yellow socks and bracelet for high fall risk patients  - Consider moving patient to room near nurses station  6/8/2025 1056 by Kasey Salomon RN  Outcome: Progressing  6/8/2025 1056 by Kasey Salomon RN  Outcome: Progressing  Goal: Maintain or return to baseline ADL function  Description: INTERVENTIONS:  -  Assess patient's ability to carry out ADLs; assess patient's baseline for ADL function and identify physical deficits which impact ability to perform ADLs (bathing, care of mouth/teeth, toileting, grooming, dressing, etc.)  - Assess/evaluate cause of self-care deficits   - Assess range of motion  - Assess patient's mobility; develop plan if impaired  - Assess patient's need for assistive devices and provide as appropriate  - Encourage maximum independence but intervene and supervise when necessary  - Involve family in performance of ADLs  - Assess for home care needs following discharge   - Consider OT consult to assist with ADL evaluation and planning for discharge  - Provide patient education as appropriate  - Monitor functional capacity and physical performance, use of AM PAC & JH-HLM   - Monitor gait, balance and fatigue  with ambulation    6/8/2025 1056 by Kasey Salomon RN  Outcome: Progressing  6/8/2025 1056 by Kasey Salomon RN  Outcome: Progressing  Goal: Maintains/Returns to pre admission functional level  Description: INTERVENTIONS:  - Perform AM-PAC 6 Click Basic Mobility/ Daily Activity assessment daily.  - Set and communicate daily mobility goal to care team and patient/family/caregiver.   - Collaborate with rehabilitation services on mobility goals if consulted  - Perform Range of Motion 2 times a day.  - Reposition patient every 2 hours.  - Dangle patient 2 times a day  - Stand patient 2 times a day  - Ambulate patient 2 times a day  - Out of bed to chair 2 times a day   - Out of bed for meals 2 times a day  - Out of bed for toileting  - Record patient progress and toleration of activity level   6/8/2025 1056 by Kasey Salomon RN  Outcome: Progressing  6/8/2025 1056 by Kasey Salomon RN  Outcome: Progressing     Problem: DISCHARGE PLANNING  Goal: Discharge to home or other facility with appropriate resources  Description: INTERVENTIONS:  - Identify barriers to discharge w/patient and caregiver  - Arrange for needed discharge resources and transportation as appropriate  - Identify discharge learning needs (meds, wound care, etc.)  - Arrange for interpretive services to assist at discharge as needed  - Refer to Case Management Department for coordinating discharge planning if the patient needs post-hospital services based on physician/advanced practitioner order or complex needs related to functional status, cognitive ability, or social support system  6/8/2025 1056 by Kasey Salomon RN  Outcome: Progressing  6/8/2025 1056 by Kasey Salomon RN  Outcome: Progressing     Problem: Knowledge Deficit  Goal: Patient/family/caregiver demonstrates understanding of disease process, treatment plan, medications, and discharge instructions  Description: Complete learning assessment and assess knowledge base.  Interventions:  - Provide  teaching at level of understanding  - Provide teaching via preferred learning methods  6/8/2025 1056 by Kasey Salomon RN  Outcome: Progressing  6/8/2025 1056 by Kasey Salomon RN  Outcome: Progressing     Problem: Prexisting or High Potential for Compromised Skin Integrity  Goal: Skin integrity is maintained or improved  Description: INTERVENTIONS:  - Identify patients at risk for skin breakdown  - Assess and monitor skin integrity including under and around medical devices   - Assess and monitor nutrition and hydration status  - Monitor labs  - Assess for incontinence   - Turn and reposition patient  - Assist with mobility/ambulation  - Relieve pressure over solo prominences   - Avoid friction and shearing  - Provide appropriate hygiene as needed including keeping skin clean and dry  - Evaluate need for skin moisturizer/barrier cream  - Collaborate with interdisciplinary team  - Patient/family teaching  - Consider wound care consult    Assess:  - Review Kumar scale daily  - Clean and moisturize skin   - Inspect skin when repositioning, toileting, and assisting with ADLS  - Assess under medical devices   - Assess extremities for adequate circulation and sensation     Bed Management:  - Have minimal linens on bed & keep smooth, unwrinkled  - Change linens as needed when moist or perspiring  - Avoid sitting or lying in one position for more than 2 hours while in bed?Keep HOB at 30 degrees   - Toileting:  - Offer bedside commode  - Assess for incontinence  - Use incontinent care products after each incontinent episode     Activity:  - Mobilize patient 2 times a day  - Encourage activity and walks on unit  - Encourage or provide ROM exercises   - Turn and reposition patient every 2 Hours  - Use appropriate equipment to lift or move patient in bed  - Instruct/ Assist with weight shifting  when out of bed in chair  - Consider limitation of chair time 2 hour intervals    Skin Care:  - Avoid use of baby powder, tape,  friction and shearing, hot water or constrictive clothing  - Relieve pressure over bony prominences   - Do not massage red bony areas    Next Steps:  - Teach patient strategies to minimize risks   - Consider consults to  interdisciplinary teams   6/8/2025 1056 by Kasey Salomon RN  Outcome: Progressing  6/8/2025 1056 by Kasey Salomon RN  Outcome: Progressing     Problem: CARDIOVASCULAR - ADULT  Goal: Maintains optimal cardiac output and hemodynamic stability  Description: INTERVENTIONS:  - Monitor I/O, vital signs and rhythm  - Monitor for S/S and trends of decreased cardiac output  - Administer and titrate ordered vasoactive medications to optimize hemodynamic stability  - Assess quality of pulses, skin color and temperature  - Assess for signs of decreased coronary artery perfusion  - Instruct patient to report change in severity of symptoms  6/8/2025 1056 by Kasey Salomon RN  Outcome: Progressing  6/8/2025 1056 by Kasey Salomon RN  Outcome: Progressing  Goal: Absence of cardiac dysrhythmias or at baseline rhythm  Description: INTERVENTIONS:  - Continuous cardiac monitoring, vital signs, obtain 12 lead EKG if ordered  - Administer antiarrhythmic and heart rate control medications as ordered  - Monitor electrolytes and administer replacement therapy as ordered  Outcome: Progressing

## 2025-06-09 VITALS
HEART RATE: 35 BPM | HEIGHT: 59 IN | DIASTOLIC BLOOD PRESSURE: 57 MMHG | RESPIRATION RATE: 16 BRPM | TEMPERATURE: 97.5 F | WEIGHT: 127 LBS | BODY MASS INDEX: 25.6 KG/M2 | OXYGEN SATURATION: 95 % | SYSTOLIC BLOOD PRESSURE: 143 MMHG

## 2025-06-09 PROBLEM — I10 HYPERTENSION: Status: ACTIVE | Noted: 2025-06-09

## 2025-06-09 PROBLEM — I10 HYPERTENSION: Chronic | Status: ACTIVE | Noted: 2025-06-09

## 2025-06-09 LAB
ANION GAP SERPL CALCULATED.3IONS-SCNC: 5 MMOL/L (ref 4–13)
BUN SERPL-MCNC: 18 MG/DL (ref 5–25)
CALCIUM SERPL-MCNC: 8.5 MG/DL (ref 8.4–10.2)
CHLORIDE SERPL-SCNC: 105 MMOL/L (ref 96–108)
CO2 SERPL-SCNC: 28 MMOL/L (ref 21–32)
CREAT SERPL-MCNC: 0.98 MG/DL (ref 0.6–1.3)
ERYTHROCYTE [DISTWIDTH] IN BLOOD BY AUTOMATED COUNT: 17.6 % (ref 11.6–15.1)
GFR SERPL CREATININE-BSD FRML MDRD: 52 ML/MIN/1.73SQ M
GLUCOSE SERPL-MCNC: 86 MG/DL (ref 65–140)
HCT VFR BLD AUTO: 35 % (ref 34.8–46.1)
HGB BLD-MCNC: 10.5 G/DL (ref 11.5–15.4)
MAGNESIUM SERPL-MCNC: 2.2 MG/DL (ref 1.9–2.7)
MCH RBC QN AUTO: 27.5 PG (ref 26.8–34.3)
MCHC RBC AUTO-ENTMCNC: 30 G/DL (ref 31.4–37.4)
MCV RBC AUTO: 92 FL (ref 82–98)
PLATELET # BLD AUTO: 217 THOUSANDS/UL (ref 149–390)
PMV BLD AUTO: 10.4 FL (ref 8.9–12.7)
POTASSIUM SERPL-SCNC: 3.7 MMOL/L (ref 3.5–5.3)
RBC # BLD AUTO: 3.82 MILLION/UL (ref 3.81–5.12)
SODIUM SERPL-SCNC: 138 MMOL/L (ref 135–147)
WBC # BLD AUTO: 6.46 THOUSAND/UL (ref 4.31–10.16)

## 2025-06-09 PROCEDURE — 85027 COMPLETE CBC AUTOMATED: CPT | Performed by: INTERNAL MEDICINE

## 2025-06-09 PROCEDURE — 80048 BASIC METABOLIC PNL TOTAL CA: CPT | Performed by: INTERNAL MEDICINE

## 2025-06-09 PROCEDURE — 99239 HOSP IP/OBS DSCHRG MGMT >30: CPT | Performed by: INTERNAL MEDICINE

## 2025-06-09 PROCEDURE — 93005 ELECTROCARDIOGRAM TRACING: CPT

## 2025-06-09 PROCEDURE — 83735 ASSAY OF MAGNESIUM: CPT | Performed by: INTERNAL MEDICINE

## 2025-06-09 RX ORDER — FERROUS SULFATE 325(65) MG
325 TABLET, DELAYED RELEASE (ENTERIC COATED) ORAL EVERY OTHER DAY
Start: 2025-06-09 | End: 2025-07-09

## 2025-06-09 RX ORDER — BUMETANIDE 0.5 MG/1
0.5 TABLET ORAL DAILY
Start: 2025-06-09 | End: 2025-06-19 | Stop reason: SDUPTHER

## 2025-06-09 RX ORDER — POTASSIUM CHLORIDE 1500 MG/1
20 TABLET, EXTENDED RELEASE ORAL ONCE
Status: COMPLETED | OUTPATIENT
Start: 2025-06-09 | End: 2025-06-09

## 2025-06-09 RX ORDER — BUMETANIDE 1 MG/1
0.5 TABLET ORAL DAILY
Status: DISCONTINUED | OUTPATIENT
Start: 2025-06-09 | End: 2025-06-09 | Stop reason: HOSPADM

## 2025-06-09 RX ADMIN — METOPROLOL SUCCINATE 12.5 MG: 25 TABLET, EXTENDED RELEASE ORAL at 08:44

## 2025-06-09 RX ADMIN — BUMETANIDE 0.5 MG: 1 TABLET ORAL at 11:48

## 2025-06-09 RX ADMIN — POTASSIUM CHLORIDE 20 MEQ: 1500 TABLET, EXTENDED RELEASE ORAL at 08:44

## 2025-06-09 RX ADMIN — RIVAROXABAN 15 MG: 15 TABLET, FILM COATED ORAL at 08:45

## 2025-06-09 RX ADMIN — Medication 500 MCG: at 08:44

## 2025-06-09 RX ADMIN — FERROUS SULFATE TAB 325 MG (65 MG ELEMENTAL FE) 325 MG: 325 (65 FE) TAB at 08:44

## 2025-06-09 RX ADMIN — POTASSIUM CHLORIDE 20 MEQ: 1500 TABLET, EXTENDED RELEASE ORAL at 05:56

## 2025-06-09 NOTE — CASE MANAGEMENT
Case Management Discharge Planning Note    Patient name Verito Fallon  Location S /S -01 MRN [id number]  : 1940 Date 2025       Current Admission Date: 2025  Current Admission Diagnosis:Acute on chronic combined systolic and diastolic CHF (congestive heart failure) (HCC)   Patient Active Problem List    Diagnosis Date Noted    Hypertension 2025    Iron deficiency anemia 2025    Vitamin B12 deficiency 2025    Other dietary vitamin B12 deficiency anemia 2025    Ambulatory dysfunction 2025    Recurrent falls 2025    Hypotension 2025    CKD (chronic kidney disease) 2025    Constipation 2025    Chronic combined systolic and diastolic congestive heart failure (HCC) 2024    Chronic kidney disease, stage 3b ([facility]) 2024    Osteoporosis 2024    Presence of permanent cardiac pacemaker 2024    History of seizure 12/15/2023    At risk for constipation 12/15/2023    At risk for delirium 12/15/2023    Advance care planning 12/15/2023    Hyperkalemia 12/15/2023    Leg hematoma, right, subsequent encounter 2023    Pruritic rash 2023    Cervical spondylosis     Cervical disc disorder with radiculopathy of mid-cervical region     Atherosclerosis with claudication of extremity ([facility]) 2021    Carotid stenosis, asymptomatic, bilateral 2021    Dementia ([facility]) 2021    Current use of long term anticoagulation 2021    Long term current use of amiodarone 2021    Renal artery stenosis (HCC) 2021    Pulmonary hypertension (HCC) 2021    Sick sinus syndrome (HCC) 2021    Hx of CABG 2021    CAD (coronary artery disease) 2021    Depression 2021    Hyperlipidemia 2021    Metabolic encephalopathy 2021    Bilateral lower extremity edema 2021    A-fib (HCC) 2021    PAD (peripheral artery disease) ([facility]) 2021    Anemia due to vitamin B12  deficiency 01/21/2021    Acute on chronic combined systolic and diastolic CHF (congestive heart failure) (HCC) 01/08/2021    Hypertensive heart and 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 (HCC) 08/22/2017      LOS (days): 4  Geometric Mean LOS (GMLOS) (days): 3.9  Days to GMLOS:0.1     OBJECTIVE:  Risk of Unplanned Readmission Score: 22.86         Current admission status: Inpatient   Preferred Pharmacy:   [facility]/pharmacy #[id number] - [place], [place] - [address]  [address] [address]  Phone: [phone] Fax: [phone]    [facility] - [place], [place] - [address]  [address] [address]  Phone: [phone] Fax: [phone]    [facility] - [place], [place] - [address]  [address]  [address]  [place] [address]  Phone: [phone] Fax: [phone]    Primary Care Provider: Beatriz Rodas DO    Primary Insurance: MEDICARE  Secondary Insurance: [employer]ET[employer]    DISCHARGE DETAILS:    Discharge planning discussed with:: Pts daughter via phone  Freedom of Choice: Yes     CM contacted family/caregiver?: Yes  Were Treatment Team discharge recommendations reviewed with patient/caregiver?: Yes  Did patient/caregiver verbalize understanding of patient care needs?: Yes       Contacts  Patient Contacts: Melyssa San (Daughter)  Relationship to Patient:: Family  Contact Method: Phone  Phone Number: [phone]  Reason/Outcome: Discharge Planning, Continuity of Care      Accepting Facility Name, City & State : [facility]  Receiving Facility/Agency Phone Number: [phone]  Facility/Agency Fax Number: [phone]       Per [facility] provider, Pt is medically ready for discharge. CM spoke with Saul at [facility] who states they can accept her back today. Saul is aware that PT is recommended for home PT/OT.  TC to Pts daughter informing her of the above. PT daughter requesting transportation.     SV requested  in [employer].     RN and [facility]IM provider aware.

## 2025-06-09 NOTE — PLAN OF CARE
Problem: PAIN - ADULT  Goal: Verbalizes/displays adequate comfort level or baseline comfort level  Description: Interventions:  - Encourage patient to monitor pain and request assistance  - Assess pain using appropriate pain scale  - Administer analgesics as ordered based on type and severity of pain and evaluate response  - Implement non-pharmacological measures as appropriate and evaluate response  - Consider cultural and social influences on pain and pain management  - Notify physician/advanced practitioner if interventions unsuccessful or patient reports new pain  - Educate patient/family on pain management process including their role and importance of  reporting pain   - Provide non-pharmacologic/complimentary pain relief interventions  Outcome: Progressing     Problem: INFECTION - ADULT  Goal: Absence or prevention of progression during hospitalization  Description: INTERVENTIONS:  - Assess and monitor for signs and symptoms of infection  - Monitor lab/diagnostic results  - Monitor all insertion sites, i.e. indwelling lines, tubes, and drains  - Monitor endotracheal if appropriate and nasal secretions for changes in amount and color  - [place] appropriate cooling/warming therapies per order  - Administer medications as ordered  - Instruct and encourage patient and family to use good hand hygiene technique  - Identify and instruct in appropriate isolation precautions for identified infection/condition  Outcome: Progressing  Goal: Absence of fever/infection during neutropenic period  Description: INTERVENTIONS:  - Monitor WBC  - Perform strict hand hygiene  - Limit to healthy visitors only  - No plants, dried, fresh or silk flowers with wagoner in patient room  Outcome: Progressing     Problem: SAFETY ADULT  Goal: Patient will remain free of falls  Description: INTERVENTIONS:  - Educate patient/family on patient safety including physical limitations  - Instruct patient to call for assistance with activity   -  Consider consulting OT/PT to assist with strengthening/mobility based on AM PAC & JH-HLM score  - Consult OT/PT to assist with strengthening/mobility   - Keep Call bell within reach  - Keep bed low and locked with side rails adjusted as appropriate  - Keep care items and personal belongings within reach  - Initiate and maintain comfort rounds  - Make Fall Risk Sign visible to staff  - Offer Toileting every 2 Hours, in advance of need  - Initiate/Maintain alarm  - Obtain necessary fall risk management equipment:   - Apply yellow socks and bracelet for high fall risk patients  - Consider moving patient to room near nurses station  Outcome: Progressing  Goal: Maintain or return to baseline ADL function  Description: INTERVENTIONS:  -  Assess patient's ability to carry out ADLs; assess patient's baseline for ADL function and identify physical deficits which impact ability to perform ADLs (bathing, care of mouth/teeth, toileting, grooming, dressing, etc.)  - Assess/evaluate cause of self-care deficits   - Assess range of motion  - Assess patient's mobility; develop plan if impaired  - Assess patient's need for assistive devices and provide as appropriate  - Encourage maximum independence but intervene and supervise when necessary  - Involve family in performance of ADLs  - Assess for home care needs following discharge   - Consider OT consult to assist with ADL evaluation and planning for discharge  - Provide patient education as appropriate  - Monitor functional capacity and physical performance, use of AM PAC & JH-HLM   - Monitor gait, balance and fatigue with ambulation    Outcome: Progressing  Goal: Maintains/Returns to pre admission functional level  Description: INTERVENTIONS:  - Perform AM-PAC 6 Click Basic Mobility/ Daily Activity assessment daily.  - Set and communicate daily mobility goal to care team and patient/family/caregiver.   - Collaborate with rehabilitation services on mobility goals if consulted  -  Perform Range of Motion 2 times a day.  - Reposition patient every 2 hours.  - Dangle patient 2 times a day  - Stand patient 2 times a day  - Ambulate patient 2 times a day  - Out of bed to chair 2 times a day   - Out of bed for meals 2 times a day  - Out of bed for toileting  - Record patient progress and toleration of activity level   Outcome: Progressing     Problem: DISCHARGE PLANNING  Goal: Discharge to home or other facility with appropriate resources  Description: INTERVENTIONS:  - Identify barriers to discharge w/patient and caregiver  - Arrange for needed discharge resources and transportation as appropriate  - Identify discharge learning needs (meds, wound care, etc.)  - Arrange for interpretive services to assist at discharge as needed  - Refer to Case Management Department for coordinating discharge planning if the patient needs post-hospital services based on physician/advanced practitioner order or complex needs related to functional status, cognitive ability, or social support system  Outcome: Progressing     Problem: Knowledge Deficit  Goal: Patient/family/caregiver demonstrates understanding of disease process, treatment plan, medications, and discharge instructions  Description: Complete learning assessment and assess knowledge base.  Interventions:  - Provide teaching at level of understanding  - Provide teaching via preferred learning methods  Outcome: Progressing     Problem: Prexisting or High Potential for Compromised Skin Integrity  Goal: Skin integrity is maintained or improved  Description: INTERVENTIONS:  - Identify patients at risk for skin breakdown  - Assess and monitor skin integrity including under and around medical devices   - Assess and monitor nutrition and hydration status  - Monitor labs  - Assess for incontinence   - Turn and reposition patient  - Assist with mobility/ambulation  - Relieve pressure over solo prominences   - Avoid friction and shearing  - Provide appropriate  hygiene as needed including keeping skin clean and dry  - Evaluate need for skin moisturizer/barrier cream  - Collaborate with interdisciplinary team  - Patient/family teaching  - Consider wound care consult    Assess:  - Review Kumar scale daily  - Clean and moisturize skin   - Inspect skin when repositioning, toileting, and assisting with ADLS  - Assess under medical devices   - Assess extremities for adequate circulation and sensation     Bed Management:  - Have minimal linens on bed & keep smooth, unwrinkled  - Change linens as needed when moist or perspiring  - Avoid sitting or lying in one position for more than 2 hours while in bed?Keep HOB at 30 degrees   - Toileting:  - Offer bedside commode  - Assess for incontinence   - Use incontinent care products after each incontinent episode     Activity:  - Mobilize patient 2 times a day  - Encourage activity and walks on unit  - Encourage or provide ROM exercises   - Turn and reposition patient every 2 Hours  - Use appropriate equipment to lift or move patient in bed  - Instruct/ Assist with weight shifting when out of bed in chair  - Consider limitation of chair time 2 hour intervals    Skin Care:  - Avoid use of baby powder, tape, friction and shearing, hot water or constrictive clothing  - Relieve pressure over bony prominences  - Do not massage red bony areas    Next Steps:  - Teach patient strategies to minimize risks   - Consider consults to  interdisciplinary teams   Outcome: Progressing     Problem: CARDIOVASCULAR - ADULT  Goal: Maintains optimal cardiac output and hemodynamic stability  Description: INTERVENTIONS:  - Monitor I/O, vital signs and rhythm  - Monitor for S/S and trends of decreased cardiac output  - Administer and titrate ordered vasoactive medications to optimize hemodynamic stability  - Assess quality of pulses, skin color and temperature  - Assess for signs of decreased coronary artery perfusion  - Instruct patient to report change in  severity of symptoms  Outcome: Progressing  Goal: Absence of cardiac dysrhythmias or at baseline rhythm  Description: INTERVENTIONS:  - Continuous cardiac monitoring, vital signs, obtain 12 lead EKG if ordered  - Administer antiarrhythmic and heart rate control medications as ordered  - Monitor electrolytes and administer replacement therapy as ordered  Outcome: Progressing

## 2025-06-09 NOTE — DISCHARGE INSTR - AVS FIRST PAGE
Dear Verito Fallon,     It was our pleasure to care for you here at [facility].  It is our hope that we were always able to exceed the expected standards for your care during your stay.  You were hospitalized due to shortness of breath and weight gain.  You were cared for on the 3rd floor by Orestes Byrd DO under the service of Melida Medina* with the [facility] who covers for your primary care physician (PCP), Beatriz Rodas DO, while you were hospitalized.  If you have any questions or concerns related to this hospitalization, you may contact us at .  For follow up as well as any medication refills, we recommend that you follow up with your primary care physician.  A registered nurse will reach out to you by phone within a few days after your discharge to answer any additional questions that you may have after going home.  However, at this time we provide for you here, the most important instructions / recommendations at discharge:     Notable Medication Adjustments -   Stop taking amiodarone until you see your cardiologist  Stop taking iron tablet every day.  Start taking iron tablet 325 mg every other day.  Take with orange juice or anything acidic to help with absorption  Start taking Bumex 0.5 mg daily  Continue taking the rest of your medications as prescribed  Testing Required after Discharge -   We recommend repeat BMP (to monitor kidney function and electrolytes) within 1 week  ** Please contact your PCP to request testing orders for any of the testing recommended here **  Important follow up information -   Follow-up with your primary care physician within 1 week.  Speak to your primary care physician regarding down titrating or even weaning off Lexapro altogether due to QTc prolongation on EKG  Follow-up with your cardiologist within this week.  Somebody from the office will call to schedule an expedited heart  failure follow-up visit.  Other Instructions -   Return to the emergency department if you have any worsening symptoms - chest pain, difficulty breathing, abdominal pain, nausea, vomiting,   Call your cardiologist office if you have greater than 3 pound weight gain in one day or more than 5 pound weight gain in one week  We have given new ambulatory referrals for physical and occupational therapy.  Somebody from the office will call to schedule appointments  Please review this entire after visit summary as additional general instructions including medication list, appointments, activity, diet, any pertinent wound care, and other additional recommendations from your care team that may be provided for you.    Sincerely,     Orestes Byrd, DO

## 2025-06-09 NOTE — DISCHARGE SUMMARY
Discharge Summary - Hospitalist   Name: Verito Fallon 84 y.o. female I MRN: [id number]  Unit/Bed#: S -01 I Date of Admission: 6/5/2025   Date of Service: 6/9/2025 I Hospital Day: 4     Assessment & Plan  Acute on chronic combined systolic and diastolic CHF (congestive heart failure) ([facility])  Wt Readings from Last 3 Encounters:   06/09/25 57.6 kg (127 lb)   05/27/25 58.9 kg (129 lb 12.8 oz)   05/09/25 54 kg (119 lb)     Presented with SOB and recent weight gain of 17 pounds within 1 month    CXR showed cardiomegaly with central congestion and interstitial pulmonary edema  Echo (6/8/25) - EF 55%. G2DD.  Akinetic apical inferior and apex.  RV mildly dilated.  RV systolic function mildly reduced.  RA mildly dilated.  Mild to moderate MR.  Moderate TR.  Estimated RVSP is 99.00 mmHg.  IVC is dilated.  No significant change from prior  Received IV Lasix 40 mg x 1 followed by IV Lasix 20 mg from 6/6 to 6/8  Home meds: Bumex 0.5 mg, Toprol 12.5 mg  Follows with cardiology Dr. Fisher    Plan:  Resume home Bumex 0.5 mg  Continue home Toprol  Follow-up with cardiology in the outpatient setting  A-fib ([facility])  Home meds: Toprol 12.5 mg, amiodarone 100 mg, Xarelto 50 mg  Continue home Toprol and Xarelto  Has QTc prolongation.  Will hold amiodarone upon discharge until follow-up with cardiology  CAD (coronary artery disease)  Hx of CABG x 4 (2011) and LAMINE (2021)  Continue home atorvastatin 20 mg, Toprol 12.5 mg  Chronic kidney disease, stage 3b ([facility])  Lab Results   Component Value Date    EGFR 52 06/09/2025    EGFR 52 06/08/2025    EGFR 49 06/07/2025    CREATININE 0.98 06/09/2025    CREATININE 0.99 06/08/2025    CREATININE 1.03 06/07/2025     Baseline is Cr 0.8 to 1.1  Hyperlipidemia  Continue home atorvastatin 20 mg  Hypertension  Continue home Toprol 12.5 mg  Restart Bumex 0.5 mg upon discharge  Iron deficiency anemia    Lab Results   Component Value Date    HGB 10.5 (L) 06/09/2025    MCV 92 06/09/2025     Lab  Results   Component Value Date    IRON 81 06/06/2025    FERRITIN 24 (L) 06/06/2025    TIBC 441 06/06/2025    CONCFE 18 06/06/2025     Lab Results   Component Value Date    ZWZLYUDJ82 219 05/19/2025    FOLATE >22.3 05/19/2025     Received IV Venofer 300 mg x 2  Restart home iron tablet but every other day  Continue B12 supplementation  Depression  Home med: Lexapro 15 mg  Lexapro was held during inpatient stay due to QTc prolongation  Lexapro 15 mg was restarted upon discharge to prevent antidepressant discontinuation syndrome  Would recommend that PCP wean down Lexapro or discontinue entirely due to QTc prolongation  Dementia (HCC)  A&Ox2 at baseline  Pleasant     Medical Problems       Resolved Problems  Date Reviewed: 6/9/2025   None       Discharging Physician / Practitioner: Orestes Byrd DO  PCP: Beatriz Rodas DO  Admission Date:   Admission Orders (From admission, onward)       Ordered        06/05/25 1758  INPATIENT ADMISSION  Once                          Discharge Date: 06/09/25    Next Steps for Physician/AP Assuming Care:  Expedited CHF follow-up  Resumption of amiodarone after QTc is within normal limits  Weaning down Lexapro or discontinuation altogether per PCP    Test Results Pending at Discharge (will require follow up):  None    Medication Changes for Discharge & Rationale:   Stop taking amiodarone until you see your cardiologist  Stop taking iron tablet every day.  Start taking iron tablet 325 mg every other day    Start taking Bumex 0.5 mg daily  See after visit summary for reconciled discharge medications provided to patient and/or family.     Consultations During Hospital Stay:  None    Procedures Performed:   None    Significant Findings / Test Results:   XR chest 1 view portable   ED Interpretation by Zia Chavez MD (06/05 1754)   My personal interpretation-lower lobe pulmonary edema, left worse than the right concerning for pulmonary edema likely secondary to CHF exacerbation.       Final Result by Ezra Colon MD (06/06 1312)   Cardiomegaly with central congestion/interstitial pulmonary edema. Increased opacification left midlung zone for which superimposed infiltrate is not excluded.      Workstation performed: [id number]           Incidental Findings:   None    Hospital Course:   Verito Fallon is a 84 y.o. female patient who originally presented to the hospital on 6/5/2025 due to shortness of breath and recent weight gain of 17 pounds within 1 month.  She was found to have CHF exacerbation.  She was diuresed with IV Lasix 40 mg x 1 followed by IV Lasix 20 mg BID from 6/6 to 6/8.  She was subsequently put back on home Bumex 0.5 mg daily dosage.  During hospital stay, she was found to have QTc prolongation.  Home amiodarone and Lexapro were held.  Lexapro 50 mg was restarted upon discharge to prevent antidepressant discontinuation syndrome.  Would recommend that PCP wean down Lexapro or discontinue entirely due to QTc prolongation.  Amiodarone remained on hold upon discharge until follow-up with cardiology.  For iron deficiency anemia, she was given 2 doses of IV Venofer 300 mg.  Home iron supplementation frequency was changed from every day to every other day.  Ambulatory referrals for PT/OT were given.  Plan for outpatient follow-up with primary care physician and cardiology.  Would recommend repeat BMP to check kidney function and electrolytes within 1 week.  PT recommended level  III.  Stable for discharge back to [facility].    Please see above list of diagnoses and related plan for additional information.     Discharge Day Visit / Exam:   Subjective: Patient was seen and examined at bedside.  She denied having any chest pain, shortness of breath, lightheadedness.  She is alert and oriented x 2 and pleasantly demented.  Vitals: Blood Pressure: 143/57 (06/09/25 1458)  Pulse: (!) 35 (06/09/25 1458)  Temperature: 97.5 °F (36.4 °C) (06/09/25 0724)  Temp Source: Oral (06/09/25  "0724)  Respirations: 16 (06/09/25 0724)  Height: 4' 11\" (149.9 cm) (06/08/25 0848)  Weight - Scale: 57.6 kg (127 lb) (06/09/25 0300)  SpO2: 95 % (06/09/25 1458)  Physical Exam  Constitutional:       General: She is not in acute distress.     Appearance: Normal appearance. She is not ill-appearing or toxic-appearing.   HENT:      Head: Normocephalic and atraumatic.      Mouth/Throat:      Mouth: Mucous membranes are moist.     Eyes:      Extraocular Movements: Extraocular movements intact.       Cardiovascular:      Rate and Rhythm: Normal rate and regular rhythm.      Heart sounds: No murmur heard.  Pulmonary:      Effort: No respiratory distress.      Breath sounds: No wheezing, rhonchi or rales.   Abdominal:      General: There is no distension.      Palpations: Abdomen is soft.      Tenderness: There is no abdominal tenderness. There is no rebound.     Musculoskeletal:         General: No swelling. Normal range of motion.      Cervical back: Normal range of motion.      Right lower leg: No edema.      Left lower leg: No edema.     Skin:     General: Skin is warm.      Capillary Refill: Capillary refill takes less than 2 seconds.     Neurological:      General: No focal deficit present.      Mental Status: She is alert. Mental status is at baseline. She is disoriented.      Comments: A&Ox2. Pleasant      Discussion with Family: Updated  (daughter) via phone.    Discharge instructions/Information to patient and family:   See after visit summary for information provided to patient and family.      Provisions for Follow-Up Care:  See after visit summary for information related to follow-up care and any pertinent home health orders.      Mobility at time of Discharge:   Basic Mobility Inpatient Raw Score: 18  JH-HLM Goal: 6: Walk 10 steps or more  JH-HLM Achieved: 6: Walk 10 steps or more  HLM Goal achieved. Continue to encourage appropriate mobility.     Disposition:   Other: [place]" [address]    Planned Readmission: No    Administrative Statements   Discharge Statement:  I have spent a total time of 30 minutes in caring for this patient on the day of the visit/encounter. .    **Please Note: This note may have been constructed using a voice recognition system**

## 2025-06-09 NOTE — ASSESSMENT & PLAN NOTE
Lab Results   Component Value Date    HGB 10.5 (L) 06/09/2025    MCV 92 06/09/2025     Lab Results   Component Value Date    IRON 81 06/06/2025    FERRITIN 24 (L) 06/06/2025    TIBC 441 06/06/2025    CONCFE 18 06/06/2025     Lab Results   Component Value Date    MTTEOGWJ05 219 05/19/2025    FOLATE >22.3 05/19/2025     Received IV Venofer 300 mg x 2  Restart home iron tablet but every other day  Continue B12 supplementation  
"Recent Labs     06/05/25  1652 06/06/25  0525   HGB 10.3* 9.7*     No results found for: \"IRON\", \"FERRITIN\", \"TIBC\", \"CONCFE\"    Hx B12 deficiency   No active bleeding  Unsure of last colonoscopy     Plan   F/u iron panel  Monitor CBC  "
A&Ox2 at baseline  Pleasant  
CAD status post CABG x 4 (2011) and LAMINE (2021)  Home regimen: Lipitor 20 mg qd, metoprolol 12.5 mg daily.  Not on antiplatelets   Continue home medications  
CAD status post CABG x 4 (2011) and LAMINE (2021)  Home regimen: Lipitor 20 mg qd, metoprolol 12.5 mg daily.  Not on antiplatelets   Continue home medications  
CAD status post CABG x 4 (2011) and LAMINE (2021)  Home regimen: Lipitor 20 mg qd, metoprolol 12.5 mg daily.  Not on antiplatelets antiplatelets  Continue home medications  
CAD status post CABG x 4 (2011) and LAMINE (2021)  Home regimen: Lipitor 20 mg qd, metoprolol 12.5 mg daily.  Not on antiplatelets antiplatelets  Continue home medications  
Continue home Toprol 12.5 mg  Restart Bumex 0.5 mg upon discharge  
Continue home atorvastatin 20 mg  
ECG upon arrival paced and rate controlled at 84  Home regimen amiodarone 100 mg daily, metoprolol 12.5 mg daily, Xarelto 15 mg daily  Continue home meds  
ECG upon arrival paced and rate controlled at 84  Home regimen amiodarone 100 mg daily, metoprolol 12.5 mg daily, Xarelto 15 mg daily  Continue home meds  Hold amiodarone due to prolonged Qtc  6/7 f/u Qtc on repeat ECG so amiodarone can be resumed  
ECG upon arrival paced and rate controlled at 84  Home regimen amiodarone 100 mg daily, metoprolol 12.5 mg daily, Xarelto 15 mg daily  Continue home meds  Hold amiodarone due to prolonged Qtc  6/8 QTc remains elevated, holding amiodarone and Lexapro  
ECG upon arrival paced and rate controlled at 84  Home regimen amiodarone 100 mg daily, metoprolol 12.5 mg daily, Xarelto 15 mg daily  Continue home meds  Hold amiodarone ue to prolonged QTcd  
Home med: Lexapro 15 mg  Lexapro was held during inpatient stay due to QTc prolongation  Lexapro 15 mg was restarted upon discharge to prevent antidepressant discontinuation syndrome  Would recommend that PCP wean down Lexapro or discontinue entirely due to QTc prolongation  
Home meds: Toprol 12.5 mg, amiodarone 100 mg, Xarelto 50 mg  Continue home Toprol and Xarelto  Has QTc prolongation.  Will hold amiodarone upon discharge until follow-up with cardiology  
Hx of CABG x 4 (2011) and LAMINE (2021)  Continue home atorvastatin 20 mg, Toprol 12.5 mg  
Lab Results   Component Value Date    CHOLESTEROL 168 12/04/2020    TRIG 88 12/04/2020    HDL 62 12/04/2020    LDLCALC 88 12/04/2020     Home regimen: Lipitor 20 mg qd, continue  
Lab Results   Component Value Date    EGFR 44 06/05/2025    EGFR 73 05/19/2025    EGFR 62 02/19/2025    CREATININE 1.13 06/05/2025    CREATININE 0.79 05/19/2025    CREATININE 0.85 02/19/2025     Baseline 0.8-1.0  Creatinine at baseline  Continue to monitor with BMP as needed  Avoid nephrotoxins  
Lab Results   Component Value Date    EGFR 45 06/06/2025    EGFR 44 06/05/2025    EGFR 73 05/19/2025    CREATININE 1.11 06/06/2025    CREATININE 1.13 06/05/2025    CREATININE 0.79 05/19/2025     Baseline 0.8-1.0  Creatinine at baseline  Continue to monitor with BMP as needed  Avoid nephrotoxins  
Lab Results   Component Value Date    EGFR 49 06/07/2025    EGFR 45 06/06/2025    EGFR 44 06/05/2025    CREATININE 1.03 06/07/2025    CREATININE 1.11 06/06/2025    CREATININE 1.13 06/05/2025     Baseline 0.8-1.0  Creatinine at baseline  Continue to monitor with BMP as needed  Avoid nephrotoxins  
Lab Results   Component Value Date    EGFR 52 06/08/2025    EGFR 49 06/07/2025    EGFR 45 06/06/2025    CREATININE 0.99 06/08/2025    CREATININE 1.03 06/07/2025    CREATININE 1.11 06/06/2025     Baseline 0.8-1.0  Creatinine at baseline  Continue to monitor with BMP as needed  Avoid nephrotoxins  
Lab Results   Component Value Date    EGFR 52 06/09/2025    EGFR 52 06/08/2025    EGFR 49 06/07/2025    CREATININE 0.98 06/09/2025    CREATININE 0.99 06/08/2025    CREATININE 1.03 06/07/2025     Baseline is Cr 0.8 to 1.1  
Recent Labs     06/05/25  1652 06/06/25  0525 06/07/25  0429   HGB 10.3* 9.7* 10.0*     Lab Results   Component Value Date    IRON 81 06/06/2025    FERRITIN 24 (L) 06/06/2025    TIBC 441 06/06/2025    CONCFE 18 06/06/2025       Hx B12 deficiency   No active bleeding  Unsure of last colonoscopy     Plan   Monitor CBC  Ferritin low at 24.  Will give Venofer 300 mg daily for 2 days  
Recent Labs     06/06/25  0525 06/07/25  0429 06/08/25  0542   HGB 9.7* 10.0* 9.4*     Lab Results   Component Value Date    IRON 81 06/06/2025    FERRITIN 24 (L) 06/06/2025    TIBC 441 06/06/2025    CONCFE 18 06/06/2025       Hx B12 deficiency   No active bleeding  Unsure of last colonoscopy     Plan   Monitor CBC  Ferritin low at 24.  Will give Venofer 300 mg daily for 2 days  
Wt Readings from Last 3 Encounters:   06/05/25 62 kg (136 lb 11 oz)   05/27/25 58.9 kg (129 lb 12.8 oz)   05/09/25 54 kg (119 lb)     Patient w/ hx CHF presents from SNF due to 17 pound weight gain over the past month and shortness of breath.   1/2021 echo EF 50%, severe hypokinesis in the distal septum, grade 2 DD, moderate MR  Home regimen: Bumex 0.5 mg daily, metoprolol 12.5 mg daily    Initial Trop 22  CXR: Upon my read patient has pulmonary edema L>R  Status post Lasix 40 mg IV in the ED    Plan   Hold home Bumex  Lasix 20 mg twice daily IV  Daily standing weights  Strict I's and O's  Fluid restrict 1.8 L, 2 g NA  Follow-up tropes  A.m. BMP and CBC  
Wt Readings from Last 3 Encounters:   06/06/25 58.7 kg (129 lb 6.4 oz)   05/27/25 58.9 kg (129 lb 12.8 oz)   05/09/25 54 kg (119 lb)     Patient w/ hx CHF presents from SNF due to 17 pound weight gain over the past month and shortness of breath.   1/2021 echo EF 50%, severe hypokinesis in the distal septum, grade 2 DD, moderate MR  Home regimen: Bumex 0.5 mg daily, metoprolol 12.5 mg daily    Trop 22/22/23  CXR: Upon my read patient has pulmonary edema L>R  Status post Lasix 40 mg IV in the ED    Plan   Hold home Bumex  Lasix 20 mg twice daily IV  F/u echo  Daily standing weights  Strict I's and O's  Fluid restrict 1.8 L, 2 g NA  
Wt Readings from Last 3 Encounters:   06/07/25 58.4 kg (128 lb 12.8 oz)   05/27/25 58.9 kg (129 lb 12.8 oz)   05/09/25 54 kg (119 lb)     Patient w/ hx CHF presents from SNF due to 17 pound weight gain over the past month and shortness of breath.   1/2021 echo EF 50%, severe hypokinesis in the distal septum, grade 2 DD, moderate MR  Home regimen: Bumex 0.5 mg daily, metoprolol 12.5 mg daily    Trop 22/22/23  CXR: Upon my read patient has pulmonary edema L>R  Status post Lasix 40 mg IV in the ED      Plan   Hold home Bumex  Lasix 20 mg twice daily IV  6/5 IV Lasix today.  Will reassess volume status tomorrow to see if patient can be transition to p.o. diuretics.  Patient may need an increase in Bumex upon discharge.   F/u echo  Daily standing weights  Strict I's and O's  Fluid restrict 1.8 L, 2 g NA  
Wt Readings from Last 3 Encounters:   06/08/25 62.1 kg (137 lb)   05/27/25 58.9 kg (129 lb 12.8 oz)   05/09/25 54 kg (119 lb)     Patient w/ hx CHF presents from SNF due to 17 pound weight gain over the past month and shortness of breath.   1/2021 echo EF 50%, severe hypokinesis in the distal septum, grade 2 DD, moderate MR  Home regimen: Bumex 0.5 mg daily, metoprolol 12.5 mg daily    Trop 22/22/23  CXR: Upon my read patient has pulmonary edema L>R  Status post Lasix 40 mg IV in the ED      Plan   Continue Lasix 20 mg twice daily IV-potential transition to p.o. tomorrow  F/u echo  Daily standing weights  Strict I's and O's  Fluid restrict 1.8 L, 2 g NA  
Wt Readings from Last 3 Encounters:   06/09/25 57.6 kg (127 lb)   05/27/25 58.9 kg (129 lb 12.8 oz)   05/09/25 54 kg (119 lb)     Presented with SOB and recent weight gain of 17 pounds within 1 month    CXR showed cardiomegaly with central congestion and interstitial pulmonary edema  Echo (6/8/25) - EF 55%. G2DD.  Akinetic apical inferior and apex.  RV mildly dilated.  RV systolic function mildly reduced.  RA mildly dilated.  Mild to moderate MR.  Moderate TR.  Estimated RVSP is 99.00 mmHg.  IVC is dilated.  No significant change from prior  Received IV Lasix 40 mg x 1 followed by IV Lasix 20 mg from 6/6 to 6/8  Home meds: Bumex 0.5 mg, Toprol 12.5 mg  Follows with cardiology Dr. Fisher    Plan:  Resume home Bumex 0.5 mg  Continue home Toprol  Follow-up with cardiology in the outpatient setting  
When ready to advance diet, recommend low fiber, GERD.

## 2025-06-09 NOTE — PLAN OF CARE
Problem: PAIN - ADULT  Goal: Verbalizes/displays adequate comfort level or baseline comfort level  Description: Interventions:  - Encourage patient to monitor pain and request assistance  - Assess pain using appropriate pain scale  - Administer analgesics as ordered based on type and severity of pain and evaluate response  - Implement non-pharmacological measures as appropriate and evaluate response  - Consider cultural and social influences on pain and pain management  - Notify physician/advanced practitioner if interventions unsuccessful or patient reports new pain  - Educate patient/family on pain management process including their role and importance of  reporting pain   - Provide non-pharmacologic/complimentary pain relief interventions  6/9/2025 1549 by Kasey Salomon RN  Outcome: Adequate for Discharge  6/9/2025 1317 by Kasey Salomon RN  Outcome: Progressing     Problem: INFECTION - ADULT  Goal: Absence or prevention of progression during hospitalization  Description: INTERVENTIONS:  - Assess and monitor for signs and symptoms of infection  - Monitor lab/diagnostic results  - Monitor all insertion sites, i.e. indwelling lines, tubes, and drains  - Monitor endotracheal if appropriate and nasal secretions for changes in amount and color  - [place] appropriate cooling/warming therapies per order  - Administer medications as ordered  - Instruct and encourage patient and family to use good hand hygiene technique  - Identify and instruct in appropriate isolation precautions for identified infection/condition  6/9/2025 1549 by Kasey Salomon RN  Outcome: Adequate for Discharge  6/9/2025 1317 by Kasey Salomon RN  Outcome: Progressing  Goal: Absence of fever/infection during neutropenic period  Description: INTERVENTIONS:  - Monitor WBC  - Perform strict hand hygiene  - Limit to healthy visitors only  - No plants, dried, fresh or silk flowers with wagoner in patient room  6/9/2025 1549 by Kasey Salomon RN  Outcome:  Adequate for Discharge  6/9/2025 1317 by Kasey Salomon RN  Outcome: Progressing     Problem: SAFETY ADULT  Goal: Patient will remain free of falls  Description: INTERVENTIONS:  - Educate patient/family on patient safety including physical limitations  - Instruct patient to call for assistance with activity   - Consider consulting OT/PT to assist with strengthening/mobility based on AM PAC & JH-HLM score  - Consult OT/PT to assist with strengthening/mobility   - Keep Call bell within reach  - Keep bed low and locked with side rails adjusted as appropriate  - Keep care items and personal belongings within reach  - Initiate and maintain comfort rounds  - Make Fall Risk Sign visible to staff  - Offer Toileting every 2 Hours, in advance of need  - Initiate/Maintain alarm  - Obtain necessary fall risk management equipment:   - Apply yellow socks and bracelet for high fall risk patients  - Consider moving patient to room near nurses station  6/9/2025 1549 by Kasey Salomon RN  Outcome: Adequate for Discharge  6/9/2025 1317 by Kasey Salomon RN  Outcome: Progressing  Goal: Maintain or return to baseline ADL function  Description: INTERVENTIONS:  -  Assess patient's ability to carry out ADLs; assess patient's baseline for ADL function and identify physical deficits which impact ability to perform ADLs (bathing, care of mouth/teeth, toileting, grooming, dressing, etc.)  - Assess/evaluate cause of self-care deficits   - Assess range of motion  - Assess patient's mobility; develop plan if impaired  - Assess patient's need for assistive devices and provide as appropriate  - Encourage maximum independence but intervene and supervise when necessary  - Involve family in performance of ADLs  - Assess for home care needs following discharge   - Consider OT consult to assist with ADL evaluation and planning for discharge  - Provide patient education as appropriate  - Monitor functional capacity and physical performance, use of AM PAC &  JH-HLM   - Monitor gait, balance and fatigue with ambulation    6/9/2025 1549 by Kasey Salomon RN  Outcome: Adequate for Discharge  6/9/2025 1317 by Kasey Salomon RN  Outcome: Progressing  Goal: Maintains/Returns to pre admission functional level  Description: INTERVENTIONS:  - Perform AM-PAC 6 Click Basic Mobility/ Daily Activity assessment daily.  - Set and communicate daily mobility goal to care team and patient/family/caregiver.   - Collaborate with rehabilitation services on mobility goals if consulted  - Perform Range of Motion 2 times a day.  - Reposition patient every 2 hours.  - Dangle patient 2 times a day  - Stand patient 2 times a day  - Ambulate patient 2 times a day  - Out of bed to chair 2 times a day   - Out of bed for meals 2 times a day  - Out of bed for toileting  - Record patient progress and toleration of activity level   6/9/2025 1549 by Kasey Salomon RN  Outcome: Adequate for Discharge  6/9/2025 1317 by Kasey Salomon RN  Outcome: Progressing     Problem: DISCHARGE PLANNING  Goal: Discharge to home or other facility with appropriate resources  Description: INTERVENTIONS:  - Identify barriers to discharge w/patient and caregiver  - Arrange for needed discharge resources and transportation as appropriate  - Identify discharge learning needs (meds, wound care, etc.)  - Arrange for interpretive services to assist at discharge as needed  - Refer to Case Management Department for coordinating discharge planning if the patient needs post-hospital services based on physician/advanced practitioner order or complex needs related to functional status, cognitive ability, or social support system  6/9/2025 1549 by Kasey Salomon RN  Outcome: Adequate for Discharge  6/9/2025 1317 by Kasey Salomon RN  Outcome: Progressing     Problem: Knowledge Deficit  Goal: Patient/family/caregiver demonstrates understanding of disease process, treatment plan, medications, and discharge instructions  Description: Complete  learning assessment and assess knowledge base.  Interventions:  - Provide teaching at level of understanding  - Provide teaching via preferred learning methods  6/9/2025 1549 by Kasey Salomon RN  Outcome: Adequate for Discharge  6/9/2025 1317 by Kasey Salomon RN  Outcome: Progressing     Problem: Prexisting or High Potential for Compromised Skin Integrity  Goal: Skin integrity is maintained or improved  Description: INTERVENTIONS:  - Identify patients at risk for skin breakdown  - Assess and monitor skin integrity including under and around medical devices   - Assess and monitor nutrition and hydration status  - Monitor labs  - Assess for incontinence   - Turn and reposition patient  - Assist with mobility/ambulation  - Relieve pressure over solo prominences   - Avoid friction and shearing  - Provide appropriate hygiene as needed including keeping skin clean and dry  - Evaluate need for skin moisturizer/barrier cream  - Collaborate with interdisciplinary team  - Patient/family teaching  - Consider wound care consult    Assess:  - Review Kumar scale daily  - Clean and moisturize skin every  - Inspect skin when repositioning, toileting, and assisting with ADLS  - Assess under medical devices   - Assess extremities for adequate circulation and sensation     Bed Management:  - Have minimal linens on bed & keep smooth, unwrinkled  - Change linens as needed when moist or perspiring  - Avoid sitting or lying in one position for more than 2 hours while in bed?Keep HOB at 30 degrees   - Toileting:  - Offer bedside commode  - Assess for incontinence   - Use incontinent care products after each incontinent episode     Activity:  - Mobilize patient 2 times a day  - Encourage activity and walks on unit  - Encourage or provide ROM exercises   - Turn and reposition patient every 2 Hours  - Use appropriate equipment to lift or move patient in bed  - Instruct/ Assist with weight shifting when out of bed in chair  - Consider  limitation of chair time 2 hour intervals    Skin Care:  - Avoid use of baby powder, tape, friction and shearing, hot water or constrictive clothing  - Relieve pressure over bony prominences   - Do not massage red bony areas    Next Steps:  - Teach patient strategies to minimize risks   - Consider consults to  interdisciplinary teams  6/9/2025 1549 by Kasey Salomon RN  Outcome: Adequate for Discharge  6/9/2025 1317 by Kasey Salomon RN  Outcome: Progressing     Problem: CARDIOVASCULAR - ADULT  Goal: Maintains optimal cardiac output and hemodynamic stability  Description: INTERVENTIONS:  - Monitor I/O, vital signs and rhythm  - Monitor for S/S and trends of decreased cardiac output  - Administer and titrate ordered vasoactive medications to optimize hemodynamic stability  - Assess quality of pulses, skin color and temperature  - Assess for signs of decreased coronary artery perfusion  - Instruct patient to report change in severity of symptoms  6/9/2025 1549 by Kasey Salomon RN  Outcome: Adequate for Discharge  6/9/2025 1317 by Kasey Salomon RN  Outcome: Progressing  Goal: Absence of cardiac dysrhythmias or at baseline rhythm  Description: INTERVENTIONS:  - Continuous cardiac monitoring, vital signs, obtain 12 lead EKG if ordered  - Administer antiarrhythmic and heart rate control medications as ordered  - Monitor electrolytes and administer replacement therapy as ordered  6/9/2025 1549 by Kasey Salomon RN  Outcome: Adequate for Discharge  6/9/2025 1317 by Kasey Salomon RN  Outcome: Progressing     Problem: PHYSICAL THERAPY ADULT  Goal: Performs mobility at highest level of function for planned discharge setting.  See evaluation for individualized goals.  Description: Treatment/Interventions: Functional transfer training, LE strengthening/ROM, Therapeutic exercise, Cognitive reorientation, Patient/family training, Equipment eval/education, Bed mobility, Gait training, Spoke to nursing     See flowsheet documentation  for full assessment, interventions and recommendations.  Outcome: Adequate for Discharge     Problem: OCCUPATIONAL THERAPY ADULT  Goal: Performs self-care activities at highest level of function for planned discharge setting.  See evaluation for individualized goals.  Description: Treatment Interventions: ADL retraining, Functional transfer training, Cognitive reorientation, Patient/family training, Compensatory technique education, Continued evaluation, Energy conservation, Activityengagement          See flowsheet documentation for full assessment, interventions and recommendations.   Outcome: Adequate for Discharge      Referring Physician (Optional): Suzie Santos MD

## 2025-06-10 ENCOUNTER — TELEPHONE (OUTPATIENT)
Age: 85
End: 2025-06-10

## 2025-06-10 ENCOUNTER — TRANSITIONAL CARE MANAGEMENT (OUTPATIENT)
Dept: GERIATRICS | Facility: OTHER | Age: 85
End: 2025-06-10

## 2025-06-10 LAB
ATRIAL RATE: 71 BPM
QRS AXIS: -84 DEGREES
QRSD INTERVAL: 134 MS
QT INTERVAL: 450 MS
QTC INTERVAL: 490 MS
T WAVE AXIS: 259 DEGREES
VENTRICULAR RATE: 71 BPM

## 2025-06-10 PROCEDURE — 93010 ELECTROCARDIOGRAM REPORT: CPT | Performed by: INTERNAL MEDICINE

## 2025-06-10 NOTE — TELEPHONE ENCOUNTER
I called to get TCM scheduled but Melyssa didn't answer the phone so I left a voice mail for her to give us a call back.

## 2025-06-11 ENCOUNTER — TELEPHONE (OUTPATIENT)
Dept: OTHER | Facility: OTHER | Age: 85
End: 2025-06-11

## 2025-06-11 ENCOUNTER — TELEPHONE (OUTPATIENT)
Age: 85
End: 2025-06-11

## 2025-06-11 ENCOUNTER — IN HOME VISIT (OUTPATIENT)
Dept: GERIATRICS | Facility: OTHER | Age: 85
End: 2025-06-11
Payer: MEDICARE

## 2025-06-11 DIAGNOSIS — N18.31 STAGE 3A CHRONIC KIDNEY DISEASE (HCC): ICD-10-CM

## 2025-06-11 DIAGNOSIS — F32.89 OTHER DEPRESSION: ICD-10-CM

## 2025-06-11 DIAGNOSIS — D51.9 ANEMIA DUE TO VITAMIN B12 DEFICIENCY, UNSPECIFIED B12 DEFICIENCY TYPE: ICD-10-CM

## 2025-06-11 DIAGNOSIS — I50.43 ACUTE ON CHRONIC COMBINED SYSTOLIC AND DIASTOLIC CHF (CONGESTIVE HEART FAILURE) (HCC): Primary | ICD-10-CM

## 2025-06-11 DIAGNOSIS — I48.0 PAROXYSMAL ATRIAL FIBRILLATION (HCC): ICD-10-CM

## 2025-06-11 DIAGNOSIS — D50.9 IRON DEFICIENCY ANEMIA, UNSPECIFIED IRON DEFICIENCY ANEMIA TYPE: ICD-10-CM

## 2025-06-11 DIAGNOSIS — I50.9 CHF (CONGESTIVE HEART FAILURE) (HCC): ICD-10-CM

## 2025-06-11 DIAGNOSIS — R53.81 PHYSICAL DECONDITIONING: ICD-10-CM

## 2025-06-11 PROCEDURE — 99496 TRANSJ CARE MGMT HIGH F2F 7D: CPT | Performed by: FAMILY MEDICINE

## 2025-06-11 NOTE — TELEPHONE ENCOUNTER
Patient is calling regarding cancelling an appointment.    Date/Time: 6/11/2025 / 8:40 am     Patient was rescheduled: YES [] NO [x]    Patient requesting call back to reschedule: YES [] NO [x]

## 2025-06-11 NOTE — TELEPHONE ENCOUNTER
Pt's sone Duane called in stating that pt is going for a dental exam next week and they need to know if she needs to take an antibiotic prior. Duane is not on any updated communication form so please call pt's Daughter Melyssa back at [phone]    Please advise

## 2025-06-11 NOTE — PROGRESS NOTES
[facility]  Progress Note  POS: Personal Care/13  In Home Visit    Chief Complaint/Reason for visit: Transitional Care Management- s/p hospitalization for CHF  History of Present Illness: 84 year old female evaluated for transitional care management visit following hospitalization at [facility] for acute on chronic CHF exacerbation; records reviewed in detail. Patient evaluated on personal care unit for in home visit. See A&P below for additional HPI. Patient continues to endorse SOB with exertion, no hypoxia reported. Weight stable within 1lb of hospital discharge weight since discharge.        Review of systems: Review of Systems   Constitutional:  Negative for activity change, appetite change, chills, fever and unexpected weight change.   Respiratory:  Positive for shortness of breath. Negative for cough, chest tightness and wheezing.    Cardiovascular:  Negative for chest pain and palpitations.   Neurological:  Negative for dizziness and light-headedness.      Medications: Medication list reviewed and reconciled to reflect current PC orders  Consults reviewed:[facility] 6/5-6/9  Labs/Diagnostics (reviewed by this provider): Hospital Paperwork  Lab Results   Component Value Date    SODIUM 138 06/09/2025    K 3.7 06/09/2025     06/09/2025    CO2 28 06/09/2025    BUN 18 06/09/2025    CREATININE 0.98 06/09/2025    GLUC 86 06/09/2025    CALCIUM 8.5 06/09/2025     Lab Results   Component Value Date    WBC 6.46 06/09/2025    HGB 10.5 (L) 06/09/2025    HCT 35.0 06/09/2025    MCV 92 06/09/2025     06/09/2025     Lab Results   Component Value Date    IRON 81 06/06/2025    TIBC 441 06/06/2025    FERRITIN 24 (L) 06/06/2025     Lab Results   Component Value Date     (H) 06/05/2025          Imaging Reviewed:  CXR (6/5/25) cardiomegaly, pulmonary edema, left midlung increased opacification  Echo (6/8/25) EF 55%, grade 2 diastolic  dysfunction    Physical Exam    Vitals reviewed in  EMR    Physical Exam  Vitals and nursing note reviewed.   Constitutional:       General: She is awake. She is not in acute distress.     Appearance: She is well-groomed. She is not toxic-appearing or diaphoretic.   HENT:      Head: Normocephalic and atraumatic.      Mouth/Throat:      Mouth: Mucous membranes are moist.     Eyes:      General: No scleral icterus.        Right eye: No discharge.         Left eye: No discharge.       Cardiovascular:      Rate and Rhythm: Normal rate.   Pulmonary:      Effort: Pulmonary effort is normal. No respiratory distress.     Musculoskeletal:      Cervical back: No rigidity.      Right lower leg: No edema.      Left lower leg: No edema.     Skin:     Coloration: Skin is not jaundiced or pale.     Neurological:      Mental Status: She is alert. Mental status is at baseline.       Assessment/Plan:  84 year old female with:    Acute on chronic combined systolic and diastolic CHF (congestive heart failure) ([facility])  Wt Readings from Last 3 Encounters:   06/09/25 57.6 kg (127 lb)   05/27/25 58.9 kg (129 lb 12.8 oz)   05/09/25 54 kg (119 lb)     S/p hospital admission for acute exacerbation of CHF  Inpatient Echo: EF 55%, grade 2 diastolic dysfunction- EF improved compared to previous Echo with 50% and 45%  Monitor daily weights, monitor volume status closely  Continue bumex 0.5mg daily  BMP ordered for 6/16  Follow up with cardiology    A-fib ([facility])  Amiodarone discontinued inpatient due to prolonged QTc on EKG  Continues on metoprolol for rate control  On chronic anticoagulation with xarelto  Chronic pacemaker in place    Iron deficiency anemia  S/p venofer inpatient  Continue ferrous sulfate every other day  Also with B12 deficiency- continue supplementation  CBC ordered for 6/16    CKD (chronic kidney disease) stage 3, GFR 30-59 ml/min ([facility])  Baseline creatinine 0.8-1.1  Monitor renal function and electrolytes closely now on daily  diuretic  BMP ordered for 6/16    Depression  Lexapro initially held inpatient due to prolonged QTc (amiodarone also discontinued)- resumed at 15mg dosing at time of discharge, ongoing reassessment and GDR as tolerated  Psychosocial support    Physical deconditioning  In setting of CHF exacerbation, hospital stay  PT and OT consults placed- evaluate and treat  Management of acute and chronic medical conditions as outlined  Fall precautions    Beatriz Rodas,   6/11/25

## 2025-06-19 PROBLEM — L28.2 PRURITIC RASH: Status: RESOLVED | Noted: 2023-07-18 | Resolved: 2025-06-19

## 2025-06-19 PROBLEM — Z91.89 AT RISK FOR DELIRIUM: Status: RESOLVED | Noted: 2023-12-15 | Resolved: 2025-06-19

## 2025-06-19 PROBLEM — D51.3 OTHER DIETARY VITAMIN B12 DEFICIENCY ANEMIA: Status: RESOLVED | Noted: 2025-05-28 | Resolved: 2025-06-19

## 2025-06-19 PROBLEM — Z79.899 LONG TERM CURRENT USE OF AMIODARONE: Status: RESOLVED | Noted: 2021-03-21 | Resolved: 2025-06-19

## 2025-06-19 PROBLEM — E87.5 HYPERKALEMIA: Status: RESOLVED | Noted: 2023-12-15 | Resolved: 2025-06-19

## 2025-06-19 PROBLEM — S80.11XD LEG HEMATOMA, RIGHT, SUBSEQUENT ENCOUNTER: Status: RESOLVED | Noted: 2023-11-30 | Resolved: 2025-06-19

## 2025-06-19 PROBLEM — R53.81 PHYSICAL DECONDITIONING: Status: ACTIVE | Noted: 2025-06-19

## 2025-06-19 PROBLEM — Z91.89 AT RISK FOR CONSTIPATION: Status: RESOLVED | Noted: 2023-12-15 | Resolved: 2025-06-19

## 2025-06-19 PROBLEM — N18.9 CKD (CHRONIC KIDNEY DISEASE): Status: RESOLVED | Noted: 2025-02-18 | Resolved: 2025-06-19

## 2025-06-19 RX ORDER — ESCITALOPRAM OXALATE 10 MG/1
15 TABLET ORAL DAILY
Start: 2025-06-19 | End: 2025-12-16

## 2025-06-19 RX ORDER — BUMETANIDE 0.5 MG/1
0.5 TABLET ORAL DAILY
Start: 2025-06-19

## 2025-06-19 RX ORDER — METOPROLOL SUCCINATE 25 MG/1
12.5 TABLET, EXTENDED RELEASE ORAL DAILY
Start: 2025-06-19

## 2025-06-19 NOTE — ASSESSMENT & PLAN NOTE
Baseline creatinine 0.8-1.1  Monitor renal function and electrolytes closely now on daily diuretic  BMP ordered for 6/16

## 2025-06-19 NOTE — ASSESSMENT & PLAN NOTE
Amiodarone discontinued inpatient due to prolonged QTc on EKG  Continues on metoprolol for rate control  On chronic anticoagulation with xarelto  Chronic pacemaker in place

## 2025-06-19 NOTE — ASSESSMENT & PLAN NOTE
In setting of CHF exacerbation, hospital stay  PT and OT consults placed- evaluate and treat  Management of acute and chronic medical conditions as outlined  Fall precautions

## 2025-06-19 NOTE — ASSESSMENT & PLAN NOTE
S/p venofer inpatient  Continue ferrous sulfate every other day  Also with B12 deficiency- continue supplementation  CBC ordered for 6/16

## 2025-06-19 NOTE — ASSESSMENT & PLAN NOTE
Wt Readings from Last 3 Encounters:   06/09/25 57.6 kg (127 lb)   05/27/25 58.9 kg (129 lb 12.8 oz)   05/09/25 54 kg (119 lb)     S/p hospital admission for acute exacerbation of CHF  Inpatient Echo: EF 55%, grade 2 diastolic dysfunction- EF improved compared to previous Echo with 50% and 45%  Monitor daily weights, monitor volume status closely  Continue bumex 0.5mg daily  BMP ordered for 6/16  Follow up with cardiology

## 2025-06-19 NOTE — ASSESSMENT & PLAN NOTE
Lexapro initially held inpatient due to prolonged QTc (amiodarone also discontinued)- resumed at 15mg dosing at time of discharge, ongoing reassessment and GDR as tolerated  Psychosocial support

## 2025-07-07 ENCOUNTER — IN HOME VISIT (OUTPATIENT)
Dept: GERIATRICS | Facility: OTHER | Age: 85
End: 2025-07-07
Payer: MEDICARE

## 2025-07-07 DIAGNOSIS — I50.42 CHRONIC COMBINED SYSTOLIC AND DIASTOLIC CONGESTIVE HEART FAILURE (HCC): Primary | ICD-10-CM

## 2025-07-07 PROCEDURE — 99308 SBSQ NF CARE LOW MDM 20: CPT

## 2025-07-22 ENCOUNTER — IN HOME VISIT (OUTPATIENT)
Dept: GERIATRICS | Facility: OTHER | Age: 85
End: 2025-07-22
Payer: MEDICARE

## 2025-07-22 DIAGNOSIS — I50.42 CHRONIC COMBINED SYSTOLIC AND DIASTOLIC CONGESTIVE HEART FAILURE (HCC): ICD-10-CM

## 2025-07-22 DIAGNOSIS — J96.01 ACUTE RESPIRATORY FAILURE WITH HYPOXIA (HCC): Primary | ICD-10-CM

## 2025-07-22 DIAGNOSIS — D51.9 ANEMIA DUE TO VITAMIN B12 DEFICIENCY, UNSPECIFIED B12 DEFICIENCY TYPE: ICD-10-CM

## 2025-07-22 PROCEDURE — 99349 HOME/RES VST EST MOD MDM 40: CPT | Performed by: FAMILY MEDICINE

## 2025-07-25 NOTE — PROGRESS NOTES
[facility]  [address] [place] [id number]  [phone]  FACILITY: [facility]  Code PC-13  ACUTE VISIT    Assessment/Plan:    1. Chronic combined systolic and diastolic congestive heart failure (HCC)  Assessment & Plan:  Current weight is stable at 125.3 lbs  Lung sounds clear to auscultation bilaterally. No respiratory distress assessed during this visit.  No BLE edema noted.  LLE with clean dry dressing intact. No weeping noted at this time.    Plan  Continue Bumex 0.5 mg po once daily.  Continue to monitor weight daily.  Continue use of compression stockings to control edema  Follow up with wound team treatment recommendations  for management of LLE skin tear.  Follow up with cardiology as scheduled               Subjective:      Patient ID: Verito Fallon is a 84 y.o. female. CODE STATUS: No CPR   PAST MEDICAL HISTORY:  Past Medical History[1]  Past Surgical History[2]       Verito Fallon is a 84 year old  LTC resident at [facility] .   Seen and evaluated at bedside today for Acute visit per request of nursing for c/o increased weeping noted on Left shin area, with wound dressing and patient's pant noted to be saturated.Dressing changing to  LLE was changed required repeated changes due to LLE weeping.  Vital signs, lab results and medication reviewed in patient's SNF EMR.          Review of Systems   Constitutional:  Negative for activity change, appetite change, fatigue and fever.   HENT:  Negative for congestion.    Respiratory:  Negative for cough, chest tightness, shortness of breath and wheezing.    Cardiovascular:  Negative for chest pain, palpitations and leg swelling.   Gastrointestinal:  Negative for abdominal distention, diarrhea, nausea and vomiting.   Genitourinary:  Negative for dysuria, frequency and urgency.   Musculoskeletal:  Negative for arthralgias and back pain.   Skin:  Negative for color change.   Neurological:  Negative for dizziness, weakness,  "light-headedness and numbness.   Hematological:  Does not bruise/bleed easily.   Psychiatric/Behavioral:  The patient is not nervous/anxious.          Objective:    VITALS: There were no vitals filed for this visit.       Physical Exam  HENT:      Head: Normocephalic.      Nose: Nose normal.     Cardiovascular:      Rate and Rhythm: Normal rate and regular rhythm.      Pulses: Normal pulses.      Heart sounds: Normal heart sounds.   Pulmonary:      Effort: Pulmonary effort is normal. No respiratory distress.      Breath sounds: Normal breath sounds. No wheezing or rales.   Abdominal:      General: There is no distension.      Palpations: Abdomen is soft.      Tenderness: There is no abdominal tenderness. There is no guarding.     Musculoskeletal:      Right lower leg: No edema.      Left lower leg: No edema.     Skin:     General: Skin is warm and dry.      Capillary Refill: Capillary refill takes less than 2 seconds.     Neurological:      Mental Status: She is alert. Mental status is at baseline.      Motor: No weakness.      Gait: Gait normal.     Psychiatric:         Mood and Affect: Mood normal.           Current Medications[3]      Please note:  Voice-recognition software may have been used in the preparation of this document.  Occasional wrong word or \"sound-alike\" substitutions may have occurred due to the inherent limitations of voice recognition software.  Interpretation should be guided by context.       KATHY Ramos    11:54 PM           [1]   Past Medical History:  Diagnosis Date    Abnormal liver enzymes 01/21/2021    Acute (reversible) ischemia of small intestine, extent unspecified (HCC) 01/14/2022    Anal fissure     Cardiac disorder     Closed fracture of fifth metacarpal bone of right hand 11/13/2023    Closed nondisplaced fracture of fifth metacarpal bone of right hand, unspecified portion of metacarpal, initial encounter 11/17/2023    Cognitive changes 12/23/2020    Edema of left upper " extremity 01/22/2021    Esophageal reflux 12/15/2023    Esophageal reflux 12/15/2023    Esophagitis, reflux     Fall 10/04/2022    Hemorrhoids     Hepatic hemangioma     Last Assessed: 1/13/2015    Herpes zoster     History of colonic polyps     Hypertension     Ischemic colitis (HCC)     Lumbar herniated disc     Malignant neoplasm without specification of site (HCC)     Memory loss 05/03/2021    Multiple contusions 11/12/2023    Nephrolithiasis     L. 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    Urinary retention 01/22/2021    Uterovaginal prolapse     Grade I-II    Wound of right leg 12/22/2022   [2]   Past Surgical History:  Procedure Laterality Date    APPENDECTOMY  1947    CARDIAC PACEMAKER PLACEMENT  01/2021    CARDIAC SURGERY      CABG    CATARACT EXTRACTION Bilateral     COLONOSCOPY  2012    Fiberoptic    COLONOSCOPY      Resolved: 2006 - 2012 5 year f/u    CORONARY ANGIOPLASTY WITH STENT PLACEMENT      CORONARY ARTERY BYPASS GRAFT      Resolved: 2012    ESOPHAGOGASTRODUODENOSCOPY  2012    Diagnostic    HEMORROIDECTOMY      KNEE SURGERY      LITHOTRIPSY      Renal    MALIGNANT SKIN LESION EXCISION      Face; Resolved: 2004    AK ESOPHAGOGASTRODUODENOSCOPY TRANSORAL DIAGNOSTIC N/A 4/13/2016    Procedure: EGD AND COLONOSCOPY;  Surgeon: Evelin Bone MD;  Location: [facility];  Service: Gastroenterology    RENAL ARTERY STENT      SKIN LESION EXCISION      Scalp    SOFT TISSUE TUMOR RESECTION      Shoulder; Resolved: 1995    SPLIT THICKNESS SKIN GRAFT Right 12/14/2023    Procedure: SKIN GRAFT SPLIT THICKNESS (STSG)  EXTREMITY; VAC application;  Surgeon: Ap Brito DO;  Location: [facility];  Service:  General    THROMBOLYSIS      Postoperative Thrombolysis PTCA    TONSILLECTOMY      Resolved: 1944    WOUND DEBRIDEMENT Right 12/8/2023    Procedure: DEBRIDEMENT LOWER EXTREMITY (WASH OUT); POSSIBLE VAC APPLICATION;  Surgeon: Abhijeet Davies MD;  Location: [facility];  Service: General   [3]   Current Outpatient Medications:     acetaminophen (TYLENOL) 325 mg tablet, Take 2 tablets (650 mg total) by mouth every 4 (four) hours as needed for mild pain, Disp: , Rfl: 0    atorvastatin (LIPITOR) 20 mg tablet, Take 1 tablet (20 mg total) by mouth daily with dinner, Disp: 90 tablet, Rfl: 4    bumetanide (BUMEX) 0.5 MG tablet, Take 1 tablet (0.5 mg total) by mouth daily, Disp: , Rfl:     escitalopram (LEXAPRO) 10 mg tablet, Take 1.5 tablets (15 mg total) by mouth in the morning., Disp: , Rfl:     ferrous sulfate 325 (65 FE) MG EC tablet, Take 1 tablet (325 mg total) by mouth every other day, Disp: , Rfl:     metoprolol succinate (TOPROL-XL) 25 mg 24 hr tablet, Take 0.5 tablets (12.5 mg total) by mouth daily, Disp: , Rfl:     nitroglycerin (NITROSTAT) 0.4 mg SL tablet, Place 1 tablet (0.4 mg total) under the tongue every 5 (five) minutes as needed for chest pain, Disp: 25 tablet, Rfl: 0    potassium chloride (MICRO-K) 10 MEQ CR capsule, Take 20 mEq by mouth daily, Disp: , Rfl:     rivaroxaban (Xarelto) 15 mg tablet, Take 1 tablet (15 mg total) by mouth daily with breakfast, Disp: 90 tablet, Rfl: 3    vitamin B-12 (VITAMIN B-12) 500 mcg tablet, Take 500 mcg by mouth daily, Disp: , Rfl:

## 2025-07-25 NOTE — ASSESSMENT & PLAN NOTE
Current weight is stable at 125.3 lbs  Lung sounds clear to auscultation bilaterally. No respiratory distress assessed during this visit.  No BLE edema noted.  LLE with clean dry dressing intact. No weeping noted at this time.    Plan  Continue Bumex 0.5 mg po once daily.  Continue to monitor weight daily.  Continue use of compression stockings to control edema  Follow up with wound team treatment recommendations  for management of LLE skin tear.  Follow up with cardiology as scheduled

## 2025-07-28 ENCOUNTER — IN HOME VISIT (OUTPATIENT)
Dept: GERIATRICS | Facility: OTHER | Age: 85
End: 2025-07-28
Payer: MEDICARE

## 2025-07-28 DIAGNOSIS — J96.01 ACUTE RESPIRATORY FAILURE WITH HYPOXIA (HCC): ICD-10-CM

## 2025-07-28 DIAGNOSIS — I50.42 CHRONIC COMBINED SYSTOLIC AND DIASTOLIC CONGESTIVE HEART FAILURE (HCC): Primary | ICD-10-CM

## 2025-07-28 PROCEDURE — 99350 HOME/RES VST EST HIGH MDM 60: CPT | Performed by: NURSE PRACTITIONER

## 2025-08-03 PROBLEM — I50.43 ACUTE ON CHRONIC COMBINED SYSTOLIC AND DIASTOLIC CHF (CONGESTIVE HEART FAILURE) (HCC): Status: RESOLVED | Noted: 2021-01-08 | Resolved: 2025-08-03

## 2025-08-08 ENCOUNTER — TELEPHONE (OUTPATIENT)
Age: 85
End: 2025-08-08

## 2025-08-11 ENCOUNTER — TELEPHONE (OUTPATIENT)
Age: 85
End: 2025-08-11

## 2025-08-14 ENCOUNTER — REMOTE DEVICE CLINIC VISIT (OUTPATIENT)
Dept: CARDIOLOGY CLINIC | Facility: CLINIC | Age: 85
End: 2025-08-14
Payer: MEDICARE

## (undated) DEVICE — 3000CC GUARDIAN II: Brand: GUARDIAN

## (undated) DEVICE — ZIMMER SKIN GRAFT CARRIER 16 INCH  LENGTH: Brand: DERMACARRIERS

## (undated) DEVICE — VAC DRESSING SENSATRAC SMALL

## (undated) DEVICE — BETHLEHEM UNIVERSAL MINOR GEN: Brand: CARDINAL HEALTH

## (undated) DEVICE — 3M™ TEGADERM™ TRANSPARENT FILM DRESSING FRAME STYLE, 1628, 6 IN X 8 IN (15 CM X 20 CM), 10/CT 8CT/CASE: Brand: 3M™ TEGADERM™

## (undated) DEVICE — CURITY NON-ADHERENT STRIPS: Brand: CURITY

## (undated) DEVICE — NEEDLE 25G X 1 1/2

## (undated) DEVICE — GLOVE SRG BIOGEL 7

## (undated) DEVICE — DRESSING XEROFORM 5 X 9

## (undated) DEVICE — TONGUE DEPRESSOR STERILE

## (undated) DEVICE — VAC CANISTER 500ML

## (undated) DEVICE — DERMATOME BLADES: Brand: DERMATOME

## (undated) DEVICE — CHLORAPREP HI-LITE 26ML ORANGE

## (undated) DEVICE — BETHLEHEM UNIV MAJOR ORTHO,KIT: Brand: CARDINAL HEALTH

## (undated) DEVICE — GLOVE INDICATOR PI UNDERGLOVE SZ 7.5 BLUE

## (undated) DEVICE — GLOVE INDICATOR PI UNDERGLOVE SZ 8 BLUE

## (undated) DEVICE — PREMIUM DRY TRAY LF: Brand: MEDLINE INDUSTRIES, INC.

## (undated) DEVICE — PLUMEPEN PRO 10FT

## (undated) DEVICE — 3M™ TEGADERM™ TRANSPARENT FILM DRESSING FRAME STYLE, 1626W, 4 IN X 4-3/4 IN (10 CM X 12 CM), 50/CT 4CT/CASE: Brand: 3M™ TEGADERM™

## (undated) DEVICE — SPONGE LAP 18 X 18 IN STRL RFD

## (undated) DEVICE — GLOVE SRG BIOGEL 8

## (undated) DEVICE — ZIMMER SKIN GRAFT CARRIER 8 INCH LENGTH: Brand: DERMACARRIERS

## (undated) DEVICE — INTENDED FOR TISSUE SEPARATION, AND OTHER PROCEDURES THAT REQUIRE A SHARP SURGICAL BLADE TO PUNCTURE OR CUT.: Brand: BARD-PARKER SAFETY BLADES SIZE 15, STERILE